# Patient Record
Sex: MALE | Race: BLACK OR AFRICAN AMERICAN | NOT HISPANIC OR LATINO | URBAN - METROPOLITAN AREA
[De-identification: names, ages, dates, MRNs, and addresses within clinical notes are randomized per-mention and may not be internally consistent; named-entity substitution may affect disease eponyms.]

---

## 2017-12-11 ENCOUNTER — INPATIENT (INPATIENT)
Facility: HOSPITAL | Age: 57
LOS: 3 days | Discharge: NURSING, REHAB OR LONG-TERM CARE | DRG: 194 | End: 2017-12-15
Attending: INTERNAL MEDICINE | Admitting: INTERNAL MEDICINE
Payer: MEDICAID

## 2017-12-11 VITALS
HEIGHT: 65 IN | WEIGHT: 126.1 LBS | OXYGEN SATURATION: 100 % | DIASTOLIC BLOOD PRESSURE: 77 MMHG | TEMPERATURE: 101 F | HEART RATE: 66 BPM | RESPIRATION RATE: 16 BRPM | SYSTOLIC BLOOD PRESSURE: 131 MMHG

## 2017-12-11 DIAGNOSIS — Z29.9 ENCOUNTER FOR PROPHYLACTIC MEASURES, UNSPECIFIED: ICD-10-CM

## 2017-12-11 DIAGNOSIS — N17.9 ACUTE KIDNEY FAILURE, UNSPECIFIED: ICD-10-CM

## 2017-12-11 DIAGNOSIS — R05 COUGH: ICD-10-CM

## 2017-12-11 DIAGNOSIS — D64.9 ANEMIA, UNSPECIFIED: ICD-10-CM

## 2017-12-11 LAB
ALBUMIN SERPL ELPH-MCNC: 3 G/DL — LOW (ref 3.5–5)
ALP SERPL-CCNC: 62 U/L — SIGNIFICANT CHANGE UP (ref 40–120)
ALT FLD-CCNC: 19 U/L DA — SIGNIFICANT CHANGE UP (ref 10–60)
ANION GAP SERPL CALC-SCNC: 7 MMOL/L — SIGNIFICANT CHANGE UP (ref 5–17)
ANION GAP SERPL CALC-SCNC: 8 MMOL/L — SIGNIFICANT CHANGE UP (ref 5–17)
APPEARANCE UR: CLEAR — SIGNIFICANT CHANGE UP
AST SERPL-CCNC: 24 U/L — SIGNIFICANT CHANGE UP (ref 10–40)
BASOPHILS # BLD AUTO: 0.1 K/UL — SIGNIFICANT CHANGE UP (ref 0–0.2)
BASOPHILS NFR BLD AUTO: 2.8 % — HIGH (ref 0–2)
BILIRUB SERPL-MCNC: 0.7 MG/DL — SIGNIFICANT CHANGE UP (ref 0.2–1.2)
BILIRUB UR-MCNC: NEGATIVE — SIGNIFICANT CHANGE UP
BUN SERPL-MCNC: 31 MG/DL — HIGH (ref 7–18)
BUN SERPL-MCNC: 33 MG/DL — HIGH (ref 7–18)
CALCIUM SERPL-MCNC: 7.2 MG/DL — LOW (ref 8.4–10.5)
CALCIUM SERPL-MCNC: 7.3 MG/DL — LOW (ref 8.4–10.5)
CHLORIDE SERPL-SCNC: 104 MMOL/L — SIGNIFICANT CHANGE UP (ref 96–108)
CHLORIDE SERPL-SCNC: 107 MMOL/L — SIGNIFICANT CHANGE UP (ref 96–108)
CK MB BLD-MCNC: 0.1 % — SIGNIFICANT CHANGE UP (ref 0–3.5)
CK MB BLD-MCNC: 0.1 % — SIGNIFICANT CHANGE UP (ref 0–3.5)
CK MB CFR SERPL CALC: 1.7 NG/ML — SIGNIFICANT CHANGE UP (ref 0–3.6)
CK MB CFR SERPL CALC: 3.7 NG/ML — HIGH (ref 0–3.6)
CK SERPL-CCNC: 1779 U/L — HIGH (ref 35–232)
CK SERPL-CCNC: 4882 U/L — HIGH (ref 35–232)
CO2 SERPL-SCNC: 29 MMOL/L — SIGNIFICANT CHANGE UP (ref 22–31)
CO2 SERPL-SCNC: 30 MMOL/L — SIGNIFICANT CHANGE UP (ref 22–31)
COLOR SPEC: YELLOW — SIGNIFICANT CHANGE UP
CREAT SERPL-MCNC: 4.07 MG/DL — HIGH (ref 0.5–1.3)
CREAT SERPL-MCNC: 4.11 MG/DL — HIGH (ref 0.5–1.3)
DIFF PNL FLD: ABNORMAL
EOSINOPHIL # BLD AUTO: 0.1 K/UL — SIGNIFICANT CHANGE UP (ref 0–0.5)
EOSINOPHIL NFR BLD AUTO: 2.3 % — SIGNIFICANT CHANGE UP (ref 0–6)
FLUAV H1 2009 PAND RNA SPEC QL NAA+PROBE: DETECTED
GLUCOSE SERPL-MCNC: 100 MG/DL — HIGH (ref 70–99)
GLUCOSE SERPL-MCNC: 60 MG/DL — LOW (ref 70–99)
GLUCOSE UR QL: NEGATIVE — SIGNIFICANT CHANGE UP
HCT VFR BLD CALC: 28.7 % — LOW (ref 39–50)
HCT VFR BLD CALC: 29.2 % — LOW (ref 39–50)
HGB BLD-MCNC: 9 G/DL — LOW (ref 13–17)
HGB BLD-MCNC: 9.1 G/DL — LOW (ref 13–17)
KETONES UR-MCNC: NEGATIVE — SIGNIFICANT CHANGE UP
LACTATE SERPL-SCNC: 0.9 MMOL/L — SIGNIFICANT CHANGE UP (ref 0.7–2)
LEUKOCYTE ESTERASE UR-ACNC: NEGATIVE — SIGNIFICANT CHANGE UP
LIDOCAIN IGE QN: 56 U/L — LOW (ref 73–393)
LYMPHOCYTES # BLD AUTO: 0.3 K/UL — LOW (ref 1–3.3)
LYMPHOCYTES # BLD AUTO: 5.4 % — LOW (ref 13–44)
MCHC RBC-ENTMCNC: 29.2 PG — SIGNIFICANT CHANGE UP (ref 27–34)
MCHC RBC-ENTMCNC: 30 PG — SIGNIFICANT CHANGE UP (ref 27–34)
MCHC RBC-ENTMCNC: 30.7 GM/DL — LOW (ref 32–36)
MCHC RBC-ENTMCNC: 31.6 GM/DL — LOW (ref 32–36)
MCV RBC AUTO: 94.9 FL — SIGNIFICANT CHANGE UP (ref 80–100)
MCV RBC AUTO: 95.2 FL — SIGNIFICANT CHANGE UP (ref 80–100)
MONOCYTES # BLD AUTO: 0.2 K/UL — SIGNIFICANT CHANGE UP (ref 0–0.9)
MONOCYTES NFR BLD AUTO: 5.1 % — SIGNIFICANT CHANGE UP (ref 2–14)
NEUTROPHILS # BLD AUTO: 4 K/UL — SIGNIFICANT CHANGE UP (ref 1.8–7.4)
NEUTROPHILS NFR BLD AUTO: 84.5 % — HIGH (ref 43–77)
NITRITE UR-MCNC: NEGATIVE — SIGNIFICANT CHANGE UP
PH UR: 6 — SIGNIFICANT CHANGE UP (ref 5–8)
PLATELET # BLD AUTO: 105 K/UL — LOW (ref 150–400)
PLATELET # BLD AUTO: 106 K/UL — LOW (ref 150–400)
POTASSIUM SERPL-MCNC: 3.3 MMOL/L — LOW (ref 3.5–5.3)
POTASSIUM SERPL-MCNC: 3.3 MMOL/L — LOW (ref 3.5–5.3)
POTASSIUM SERPL-SCNC: 3.3 MMOL/L — LOW (ref 3.5–5.3)
POTASSIUM SERPL-SCNC: 3.3 MMOL/L — LOW (ref 3.5–5.3)
PROT SERPL-MCNC: 6.5 G/DL — SIGNIFICANT CHANGE UP (ref 6–8.3)
PROT UR-MCNC: 500 MG/DL
RAPID RVP RESULT: DETECTED
RBC # BLD: 3.02 M/UL — LOW (ref 4.2–5.8)
RBC # BLD: 3.07 M/UL — LOW (ref 4.2–5.8)
RBC # FLD: 13.3 % — SIGNIFICANT CHANGE UP (ref 10.3–14.5)
RBC # FLD: 13.4 % — SIGNIFICANT CHANGE UP (ref 10.3–14.5)
SODIUM SERPL-SCNC: 141 MMOL/L — SIGNIFICANT CHANGE UP (ref 135–145)
SODIUM SERPL-SCNC: 144 MMOL/L — SIGNIFICANT CHANGE UP (ref 135–145)
SP GR SPEC: 1.01 — SIGNIFICANT CHANGE UP (ref 1.01–1.02)
TROPONIN I SERPL-MCNC: 0.15 NG/ML — HIGH (ref 0–0.04)
TROPONIN I SERPL-MCNC: 0.31 NG/ML — HIGH (ref 0–0.04)
UROBILINOGEN FLD QL: NEGATIVE — SIGNIFICANT CHANGE UP
WBC # BLD: 4.8 K/UL — SIGNIFICANT CHANGE UP (ref 3.8–10.5)
WBC # BLD: 6.4 K/UL — SIGNIFICANT CHANGE UP (ref 3.8–10.5)
WBC # FLD AUTO: 4.8 K/UL — SIGNIFICANT CHANGE UP (ref 3.8–10.5)
WBC # FLD AUTO: 6.4 K/UL — SIGNIFICANT CHANGE UP (ref 3.8–10.5)

## 2017-12-11 PROCEDURE — 99285 EMERGENCY DEPT VISIT HI MDM: CPT

## 2017-12-11 PROCEDURE — 74176 CT ABD & PELVIS W/O CONTRAST: CPT | Mod: 26

## 2017-12-11 PROCEDURE — 71020: CPT | Mod: 26

## 2017-12-11 RX ORDER — CHOLECALCIFEROL (VITAMIN D3) 125 MCG
1000 CAPSULE ORAL DAILY
Qty: 0 | Refills: 0 | Status: DISCONTINUED | OUTPATIENT
Start: 2017-12-11 | End: 2017-12-15

## 2017-12-11 RX ORDER — SODIUM CHLORIDE 9 MG/ML
1000 INJECTION INTRAMUSCULAR; INTRAVENOUS; SUBCUTANEOUS ONCE
Qty: 0 | Refills: 0 | Status: COMPLETED | OUTPATIENT
Start: 2017-12-11 | End: 2017-12-11

## 2017-12-11 RX ORDER — CEFTRIAXONE 500 MG/1
1 INJECTION, POWDER, FOR SOLUTION INTRAMUSCULAR; INTRAVENOUS ONCE
Qty: 0 | Refills: 0 | Status: COMPLETED | OUTPATIENT
Start: 2017-12-11 | End: 2017-12-11

## 2017-12-11 RX ORDER — IBUPROFEN 200 MG
800 TABLET ORAL ONCE
Qty: 0 | Refills: 0 | Status: COMPLETED | OUTPATIENT
Start: 2017-12-11 | End: 2017-12-11

## 2017-12-11 RX ORDER — PREGABALIN 225 MG/1
1000 CAPSULE ORAL DAILY
Qty: 0 | Refills: 0 | Status: DISCONTINUED | OUTPATIENT
Start: 2017-12-11 | End: 2017-12-15

## 2017-12-11 RX ORDER — FLUDROCORTISONE ACETATE 0.1 MG/1
0.1 TABLET ORAL
Qty: 0 | Refills: 0 | Status: DISCONTINUED | OUTPATIENT
Start: 2017-12-11 | End: 2017-12-15

## 2017-12-11 RX ORDER — OFLOXACIN 0.3 %
1 DROPS OPHTHALMIC (EYE)
Qty: 0 | Refills: 0 | Status: DISCONTINUED | OUTPATIENT
Start: 2017-12-11 | End: 2017-12-15

## 2017-12-11 RX ORDER — ASPIRIN/CALCIUM CARB/MAGNESIUM 324 MG
81 TABLET ORAL DAILY
Qty: 0 | Refills: 0 | Status: DISCONTINUED | OUTPATIENT
Start: 2017-12-11 | End: 2017-12-15

## 2017-12-11 RX ORDER — PREDNISOLONE SODIUM PHOSPHATE 1 %
1 DROPS OPHTHALMIC (EYE)
Qty: 0 | Refills: 0 | Status: DISCONTINUED | OUTPATIENT
Start: 2017-12-11 | End: 2017-12-15

## 2017-12-11 RX ORDER — SIMVASTATIN 20 MG/1
40 TABLET, FILM COATED ORAL AT BEDTIME
Qty: 0 | Refills: 0 | Status: DISCONTINUED | OUTPATIENT
Start: 2017-12-11 | End: 2017-12-12

## 2017-12-11 RX ADMIN — CEFTRIAXONE 100 GRAM(S): 500 INJECTION, POWDER, FOR SOLUTION INTRAMUSCULAR; INTRAVENOUS at 17:45

## 2017-12-11 RX ADMIN — Medication 800 MILLIGRAM(S): at 12:25

## 2017-12-11 RX ADMIN — Medication 800 MILLIGRAM(S): at 12:32

## 2017-12-11 RX ADMIN — SODIUM CHLORIDE 1000 MILLILITER(S): 9 INJECTION INTRAMUSCULAR; INTRAVENOUS; SUBCUTANEOUS at 20:35

## 2017-12-11 RX ADMIN — SODIUM CHLORIDE 1000 MILLILITER(S): 9 INJECTION INTRAMUSCULAR; INTRAVENOUS; SUBCUTANEOUS at 12:24

## 2017-12-11 NOTE — H&P ADULT - PROBLEM SELECTOR PLAN 5
-Improve score 1, given immobilisation  -No indication for DVt prophylaxis. -Patient presented with Hb 9.0, denies blood in stool or black colored stool.   -Follow Anemia panel and occult blood in stools (patient refusing rectal exam at this point)

## 2017-12-11 NOTE — ED PROVIDER NOTE - OBJECTIVE STATEMENT
56 y/o M pt with no PMHx and no PSHx presents to ED c/o fever, cough, and L lower rib pain x2 days. Pt denies chest pain, shortness of breath, or any other complaints. NKDA.

## 2017-12-11 NOTE — H&P ADULT - PROBLEM SELECTOR PLAN 3
-Patient presented with Hb 9.0, denies blood in stool or black colored stool.   -Follow Anemia panel -Patient has elevated troponin. CPK elevated to 4882, could be secondary to dehydration versus CKD. EKG: No evidence of ischemia.   -Iv hydration and follow CPK level. -Cr is 4.07. Patient had CKD as per charts but with unknown baseline.   -S/p 2L normal saline bolus  -Follow urine lytes and repeat BMP  -Ct scan -ve for hydronephrosis. -Cr is 4.07. Patient had CKD as per charts but with unknown baseline.   -S/p 2L normal saline bolus  -Follow urine lytes and repeat BMP  -Nephro Dr wright  -Ct scan -ve for hydronephrosis.

## 2017-12-11 NOTE — H&P ADULT - PROBLEM SELECTOR PLAN 1
-Patient presented with cough and fever, found to have Influenza positive on RVP testing. -Patient presented with cough and fever with associated reproducible chest pain, found to have Influenza positive on RVP testing.  -Chest pain is likely secondary to cough, cardiac enzymes are negative. EKG: Sinus tach at 120 bpm. Monitor on telemtery  -Started Tamiflu renally dosed. -Patient presented with cough and fever with associated reproducible chest pain, found to have Influenza positive on RVP testing.  -Chest pain is likely secondary to cough, cardiac enzymes are negative. EKG: Sinus tach at 120 bpm. Monitor on telemetry.  -Started Tamiflu renally dosed.

## 2017-12-11 NOTE — ED ADULT NURSE NOTE - ED STAT RN HANDOFF DETAILS
Pt alert and verbally responsive NAD, still pending urine test Pt instructed urinal given,  observed sleeping most of the time endorsed to Tiarra BORDEN

## 2017-12-11 NOTE — H&P ADULT - PROBLEM SELECTOR PLAN 4
-Improve score 1, given immobilisation  -No indication for DVt prophylaxis. -Patient presented with Hb 9.0, denies blood in stool or black colored stool.   -Follow Anemia panel and occult blood in stools. -Patient presented with Hb 9.0, denies blood in stool or black colored stool.   -Follow Anemia panel and occult blood in stools (patient refusing rectal exam at this point) -Patient has elevated troponin. CPK elevated to 4882, could be secondary to dehydration versus CKD. EKG: No evidence of ischemia.   -Iv hydration and follow CPK level. -Patient has elevated troponin. CPK elevated to 4882, could be secondary to dehydration versus CKD. EKG: No evidence of ischemia.   -Iv hydration and follow CPK level.  -Cardio Dr Still

## 2017-12-11 NOTE — ED ADULT NURSE NOTE - OBJECTIVE STATEMENT
pt as left below knee amputation w prostesis BIB EMS from care home with c/o upper abd pain cough and fever for 2 days denies C/P SOB, pt as left below knee amputation w prostesis

## 2017-12-11 NOTE — H&P ADULT - ASSESSMENT
57 M with PMH of anemia, CKD, CHF, OM s/p BKA L leg, CAD, glaucoma, htn, hyperparathyroid, neuropathy, OA, bronchitis, DM presented with cough and chest pain from last 3 days.

## 2017-12-11 NOTE — ED PROVIDER NOTE - PHYSICAL EXAMINATION
RESPIRATORY: Coughing on examination. RESPIRATORY: Coughing on examination. tenderness to palpation to left lower ribs. abdomen nontender

## 2017-12-11 NOTE — H&P ADULT - HISTORY OF PRESENT ILLNESS
57 M with PMH of anemia, CKD 57 M with PMH of anemia, CKD, CHF, OM s/p BKA L leg, CAD, glaucoma, htn, hyperparathyroid, neuropathy, OA, bronchitis, DM presented with cough and chest pain from last 3 days. Cough is productive with whitish sputum, associated with nausea, subjective fevers and chills. Chest pain is on and off 8/10 located in L chest/abdominal pain (described as rib pain), reproducible with cough and pressure.   Denies SOB, nausea, vomiting, diarrhea, constipation, leg swelling, abdominal pain, headache.     Sh: Current smoker, smokes 2 cig per day, non alcoholic, non drinker.   Fh: Not available from patient. 57 M with PMH of anemia, CKD, CHF, OM s/p BKA L leg, CAD, glaucoma, htn, hyperparathyroid, neuropathy, OA, bronchitis, DM presented with cough and chest pain from last 2 days. Cough is productive with whitish sputum, associated with nausea, subjective fevers and chills. Chest pain is on and off 8/10 located in L chest/abdominal pain (described as rib pain), reproducible with cough and pressure.   Denies SOB, nausea, vomiting, diarrhea, constipation, leg swelling, abdominal pain, headache.     Sh: Current smoker, smokes 2 cig per day, non alcoholic, non drinker.   Fh: Not available from patient.

## 2017-12-11 NOTE — ED PROVIDER NOTE - MEDICAL DECISION MAKING DETAILS
56 y/o M pt presents with PNA. Plan for CXR, labs, and will admit for Abx's. 58 y/o M pt presents with cough fever left rib pain. xray unremakrable. awaiting CT abdomen. I spoke with Dr De La Torre who said patient has some renal insufficiency but current creatitinine is elevated from baseline. admit to medicine once CT is normal. awaiting urinalysis.

## 2017-12-11 NOTE — H&P ADULT - PROBLEM SELECTOR PLAN 2
-Cr is -Cr is 4.07. Patient had CKD as per charts but with unknown baseline.   -S/p 2L normal saline bolus  -Follow urine lytes and repeat BMP  -Ct scan -ve for hydronephrosis. -CPK is elevated to 6412, likely secondary to dehydration secondary to fevers and chills  -Increase rate of IV fluids to 100ml/hr  -Trend CPK levels

## 2017-12-12 DIAGNOSIS — M62.82 RHABDOMYOLYSIS: ICD-10-CM

## 2017-12-12 DIAGNOSIS — R74.8 ABNORMAL LEVELS OF OTHER SERUM ENZYMES: ICD-10-CM

## 2017-12-12 DIAGNOSIS — N18.9 CHRONIC KIDNEY DISEASE, UNSPECIFIED: ICD-10-CM

## 2017-12-12 DIAGNOSIS — I15.1 HYPERTENSION SECONDARY TO OTHER RENAL DISORDERS: ICD-10-CM

## 2017-12-12 DIAGNOSIS — E87.6 HYPOKALEMIA: ICD-10-CM

## 2017-12-12 LAB
24R-OH-CALCIDIOL SERPL-MCNC: 36.6 NG/ML — SIGNIFICANT CHANGE UP (ref 30–80)
ALBUMIN SERPL ELPH-MCNC: 2.8 G/DL — LOW (ref 3.5–5)
ALP SERPL-CCNC: 57 U/L — SIGNIFICANT CHANGE UP (ref 40–120)
ALT FLD-CCNC: 25 U/L DA — SIGNIFICANT CHANGE UP (ref 10–60)
ANION GAP SERPL CALC-SCNC: 11 MMOL/L — SIGNIFICANT CHANGE UP (ref 5–17)
AST SERPL-CCNC: 76 U/L — HIGH (ref 10–40)
BASOPHILS # BLD AUTO: 0 K/UL — SIGNIFICANT CHANGE UP (ref 0–0.2)
BASOPHILS NFR BLD AUTO: 0.4 % — SIGNIFICANT CHANGE UP (ref 0–2)
BILIRUB SERPL-MCNC: 0.5 MG/DL — SIGNIFICANT CHANGE UP (ref 0.2–1.2)
BUN SERPL-MCNC: 36 MG/DL — HIGH (ref 7–18)
CALCIUM SERPL-MCNC: 7 MG/DL — LOW (ref 8.4–10.5)
CHLORIDE SERPL-SCNC: 103 MMOL/L — SIGNIFICANT CHANGE UP (ref 96–108)
CHOLEST SERPL-MCNC: 107 MG/DL — SIGNIFICANT CHANGE UP (ref 10–199)
CK MB BLD-MCNC: 0 % — SIGNIFICANT CHANGE UP (ref 0–3.5)
CK MB CFR SERPL CALC: 3 NG/ML — SIGNIFICANT CHANGE UP (ref 0–3.6)
CK SERPL-CCNC: 6412 U/L — HIGH (ref 35–232)
CO2 SERPL-SCNC: 25 MMOL/L — SIGNIFICANT CHANGE UP (ref 22–31)
CREAT ?TM UR-MCNC: 77 MG/DL — SIGNIFICANT CHANGE UP
CREAT SERPL-MCNC: 4.08 MG/DL — HIGH (ref 0.5–1.3)
EOSINOPHIL # BLD AUTO: 0 K/UL — SIGNIFICANT CHANGE UP (ref 0–0.5)
EOSINOPHIL NFR BLD AUTO: 0.4 % — SIGNIFICANT CHANGE UP (ref 0–6)
GLUCOSE BLDC GLUCOMTR-MCNC: 76 MG/DL — SIGNIFICANT CHANGE UP (ref 70–99)
GLUCOSE BLDC GLUCOMTR-MCNC: 78 MG/DL — SIGNIFICANT CHANGE UP (ref 70–99)
GLUCOSE BLDC GLUCOMTR-MCNC: 88 MG/DL — SIGNIFICANT CHANGE UP (ref 70–99)
GLUCOSE BLDC GLUCOMTR-MCNC: 90 MG/DL — SIGNIFICANT CHANGE UP (ref 70–99)
GLUCOSE SERPL-MCNC: 61 MG/DL — LOW (ref 70–99)
HBA1C BLD-MCNC: 6.3 % — HIGH (ref 4–5.6)
HCT VFR BLD CALC: 27.3 % — LOW (ref 39–50)
HDLC SERPL-MCNC: 58 MG/DL — SIGNIFICANT CHANGE UP (ref 40–125)
HGB BLD-MCNC: 8.5 G/DL — LOW (ref 13–17)
LIPID PNL WITH DIRECT LDL SERPL: 33 MG/DL — SIGNIFICANT CHANGE UP
LYMPHOCYTES # BLD AUTO: 0.3 K/UL — LOW (ref 1–3.3)
LYMPHOCYTES # BLD AUTO: 3.8 % — LOW (ref 13–44)
MAGNESIUM SERPL-MCNC: 1.9 MG/DL — SIGNIFICANT CHANGE UP (ref 1.6–2.6)
MCHC RBC-ENTMCNC: 29.9 PG — SIGNIFICANT CHANGE UP (ref 27–34)
MCHC RBC-ENTMCNC: 31.3 GM/DL — LOW (ref 32–36)
MCV RBC AUTO: 95.4 FL — SIGNIFICANT CHANGE UP (ref 80–100)
MONOCYTES # BLD AUTO: 0.3 K/UL — SIGNIFICANT CHANGE UP (ref 0–0.9)
MONOCYTES NFR BLD AUTO: 3.9 % — SIGNIFICANT CHANGE UP (ref 2–14)
NEUTROPHILS # BLD AUTO: 6.8 K/UL — SIGNIFICANT CHANGE UP (ref 1.8–7.4)
NEUTROPHILS NFR BLD AUTO: 91.5 % — HIGH (ref 43–77)
OSMOLALITY UR: 342 MOS/KG — SIGNIFICANT CHANGE UP (ref 50–1200)
PHOSPHATE SERPL-MCNC: 2.7 MG/DL — SIGNIFICANT CHANGE UP (ref 2.5–4.5)
PLATELET # BLD AUTO: 98 K/UL — LOW (ref 150–400)
POTASSIUM SERPL-MCNC: 3 MMOL/L — LOW (ref 3.5–5.3)
POTASSIUM SERPL-SCNC: 3 MMOL/L — LOW (ref 3.5–5.3)
POTASSIUM UR-SCNC: 23 MMOL/L — LOW (ref 25–125)
PROT SERPL-MCNC: 6.3 G/DL — SIGNIFICANT CHANGE UP (ref 6–8.3)
RBC # BLD: 2.86 M/UL — LOW (ref 4.2–5.8)
RBC # FLD: 13.1 % — SIGNIFICANT CHANGE UP (ref 10.3–14.5)
SODIUM SERPL-SCNC: 139 MMOL/L — SIGNIFICANT CHANGE UP (ref 135–145)
SODIUM UR-SCNC: 59 MMOL/L — SIGNIFICANT CHANGE UP (ref 40–220)
TOTAL CHOLESTEROL/HDL RATIO MEASUREMENT: 1.8 RATIO — LOW (ref 3.4–9.6)
TRIGL SERPL-MCNC: 82 MG/DL — SIGNIFICANT CHANGE UP (ref 10–149)
TROPONIN I SERPL-MCNC: 0.54 NG/ML — HIGH (ref 0–0.04)
TSH SERPL-MCNC: 0.59 UU/ML — SIGNIFICANT CHANGE UP (ref 0.34–4.82)
VIT B12 SERPL-MCNC: >2000 PG/ML — HIGH (ref 243–894)
WBC # BLD: 7.5 K/UL — SIGNIFICANT CHANGE UP (ref 3.8–10.5)
WBC # FLD AUTO: 7.5 K/UL — SIGNIFICANT CHANGE UP (ref 3.8–10.5)

## 2017-12-12 RX ORDER — ACETAMINOPHEN 500 MG
650 TABLET ORAL EVERY 6 HOURS
Qty: 0 | Refills: 0 | Status: DISCONTINUED | OUTPATIENT
Start: 2017-12-12 | End: 2017-12-15

## 2017-12-12 RX ORDER — POTASSIUM CHLORIDE 20 MEQ
40 PACKET (EA) ORAL ONCE
Qty: 0 | Refills: 0 | Status: COMPLETED | OUTPATIENT
Start: 2017-12-12 | End: 2017-12-12

## 2017-12-12 RX ORDER — POTASSIUM CHLORIDE 20 MEQ
20 PACKET (EA) ORAL ONCE
Qty: 0 | Refills: 0 | Status: COMPLETED | OUTPATIENT
Start: 2017-12-12 | End: 2017-12-12

## 2017-12-12 RX ORDER — INSULIN LISPRO 100/ML
VIAL (ML) SUBCUTANEOUS
Qty: 0 | Refills: 0 | Status: DISCONTINUED | OUTPATIENT
Start: 2017-12-12 | End: 2017-12-15

## 2017-12-12 RX ORDER — SODIUM CHLORIDE 9 MG/ML
1000 INJECTION INTRAMUSCULAR; INTRAVENOUS; SUBCUTANEOUS
Qty: 0 | Refills: 0 | Status: DISCONTINUED | OUTPATIENT
Start: 2017-12-12 | End: 2017-12-12

## 2017-12-12 RX ORDER — SODIUM CHLORIDE 9 MG/ML
1000 INJECTION INTRAMUSCULAR; INTRAVENOUS; SUBCUTANEOUS
Qty: 0 | Refills: 0 | Status: DISCONTINUED | OUTPATIENT
Start: 2017-12-12 | End: 2017-12-15

## 2017-12-12 RX ADMIN — Medication 1 APPLICATION(S): at 05:48

## 2017-12-12 RX ADMIN — Medication 1 DROP(S): at 18:02

## 2017-12-12 RX ADMIN — Medication 1 APPLICATION(S): at 18:05

## 2017-12-12 RX ADMIN — Medication 1 DROP(S): at 05:48

## 2017-12-12 RX ADMIN — SODIUM CHLORIDE 80 MILLILITER(S): 9 INJECTION INTRAMUSCULAR; INTRAVENOUS; SUBCUTANEOUS at 05:45

## 2017-12-12 RX ADMIN — Medication 30 MILLIGRAM(S): at 00:59

## 2017-12-12 RX ADMIN — Medication 81 MILLIGRAM(S): at 11:31

## 2017-12-12 RX ADMIN — Medication 40 MILLIEQUIVALENT(S): at 09:50

## 2017-12-12 RX ADMIN — Medication 1 DROP(S): at 00:40

## 2017-12-12 RX ADMIN — Medication 1 DROP(S): at 18:05

## 2017-12-12 RX ADMIN — Medication 30 MILLIGRAM(S): at 11:31

## 2017-12-12 RX ADMIN — SODIUM CHLORIDE 100 MILLILITER(S): 9 INJECTION INTRAMUSCULAR; INTRAVENOUS; SUBCUTANEOUS at 09:50

## 2017-12-12 RX ADMIN — Medication 1000 UNIT(S): at 11:30

## 2017-12-12 RX ADMIN — Medication 1 APPLICATION(S): at 00:40

## 2017-12-12 RX ADMIN — Medication 1 DROP(S): at 05:47

## 2017-12-12 RX ADMIN — FLUDROCORTISONE ACETATE 0.1 MILLIGRAM(S): 0.1 TABLET ORAL at 05:46

## 2017-12-12 RX ADMIN — PREGABALIN 1000 MICROGRAM(S): 225 CAPSULE ORAL at 11:31

## 2017-12-12 RX ADMIN — Medication 20 MILLIEQUIVALENT(S): at 09:49

## 2017-12-12 RX ADMIN — Medication 1 APPLICATION(S): at 11:31

## 2017-12-12 RX ADMIN — FLUDROCORTISONE ACETATE 0.1 MILLIGRAM(S): 0.1 TABLET ORAL at 18:01

## 2017-12-12 NOTE — CONSULT NOTE ADULT - SUBJECTIVE AND OBJECTIVE BOX
Patient is a 57y Male whom presented to the hospital with cough and chest pain, being treated for rhabdomyolysis and flu, noted to have abnormal renal function    Medical HPI:  57 M with PMH of anemia, CKD, CHF, OM s/p BKA L leg, CAD, glaucoma, htn, hyperparathyroid, neuropathy, OA, bronchitis, DM presented with cough and chest pain from last 2 days. Cough is productive with whitish sputum, associated with nausea, subjective fevers and chills. Chest pain is on and off 8/10 located in L chest/abdominal pain (described as rib pain), reproducible with cough and pressure.   Denies SOB, nausea, vomiting, diarrhea, constipation, leg swelling, abdominal pain, headache.     Sh: Current smoker, smokes 2 cig per day, non alcoholic, non drinker.   Fh: Not available from patient. (11 Dec 2017 22:27)  Renal HPI:  pts current chart reviewed and case discussed with resident  pt is known to have ckd but stage or baseline serum cr is unknown  pt is aware of ckd x 2 yrs but does not know the stage   pt denies pmh of renal stones, recurrent uti or nephrotic edema or nephrotic syndrome  pt give pmh of retinopathy and s/p laser treatment  pt denies Nsaids use or otc other nephrotoxic supplements  pt currently denies skin rash, leg edema,gi or gusymptons    PAST MEDICAL & SURGICAL HISTORY:  CKD, CHF, OM s/p BKA L leg, CAD, glaucoma, htn, hyperparathyroid, neuropathy, OA, bronchitis, and DM       Home Medications: Reviewed    MEDICATIONS  (STANDING):  aspirin  chewable 81 milliGRAM(s) Oral daily  cholecalciferol 1000 Unit(s) Oral daily  cyanocobalamin 1000 MICROGram(s) Oral daily  fludroCORTISONE 0.1 milliGRAM(s) Oral two times a day  insulin lispro (HumaLOG) corrective regimen sliding scale   SubCutaneous three times a day before meals  ofloxacin 0.3% Solution 1 Drop(s) Both EYES four times a day  oseltamivir 30 milliGRAM(s) Oral daily  petrolatum Ophthalmic Ointment 1 Application(s) Both EYES four times a day  prednisoLONE acetate 1% Suspension 1 Drop(s) Both EYES two times a day  sodium chloride 0.9%. 1000 milliLiter(s) (100 mL/Hr) IV Continuous <Continuous>    MEDICATIONS  (PRN):  acetaminophen   Tablet 650 milliGRAM(s) Oral every 6 hours PRN For Temp greater than 38 C (100.4 F)      Allergies    fish (Rash)  liver (Anaphylaxis)  No Known Drug Allergies    Intolerances        SOCIAL HISTORY:  Alcohol use [X ] No  [ ] Yes  Smoking  [ ] No  [x ] Yesx many yrs  Drug Abuse [X ] No  [ ] Yes  Tattoo [X ] No  [ ] Yes  History of Blood transfusion [ ] No  [ x] Yes    FAMILY HISTORY:n/c      REVIEW OF SYSTEMS:  CONSTITUTIONAL: +  weakness, +fevers , no  chills  EYES/ENT: No visual changes;  No vertigo or throat pain   NECK: No pain or stiffness  RESPIRATORY: + cough, no wheezing, hemoptysis; No shortness of breath  CARDIOVASCULAR: No chest pain or palpitations  GASTROINTESTINAL: No abdominal or epigastric pain. No nausea, vomiting, or hematemesis; No diarrhea or constipation. No melena or hematochezia.  GENITOURINARY: No dysuria, frequency or hematuria  NEUROLOGICAL: No numbness or weakness  SKIN: No itching, burning, rashes, or lesions   All other review of systems is negative unless indicated above    Vital Signs  T(F): 98.2 (17 @ 19:56), Max: 100.6 (17 @ 05:14)  HR: 101 (17 @ 19:56) (101 - 119)  BP: 151/92 (17 @ 19:56) (143/68 - 180/90)  ABP: --  RR: 20 (17 @ 19:56) (18 - 20)  SpO2: 94% (17 @ 19:56) (93% - 97%)  Wt(kg): --  CVP(cm H2O): --  CO: --  PCWP: --    I and O's:    Daily     Daily     PHYSICAL EXAM:  Constitutional: well developed, Mal-nourished  and in nad  HEENT: PERRLA,  no icteric sclera and mild pallor of conjunctiva noted  Neck: No JVD, thyromegaly or adenopathy  Respiratory: reduced air entry lower lungs with no rales, wheezing or rhonchi  Cardiovascular: S1 and S2 normally heard  Gastrointestinal: soft, nondistended, nontender and normal bowel sounds heard  Extremities: No peripheral edema or cyanosis  pt has LLE Prosthesis noted  Neurological: A/O x 3, no focal deficits  Psychiatric: Normal mood, normal affect  : No flank tenderness  Skin: No rashes        LABS:                        8.5    7.5   )-----------( 98       ( 12 Dec 2017 05:18 )             27.3     2.7  --            139  |  103  |  36<H>  ----------------------------<  61<L>  3.0<L>   |  25  |  4.08<H>  egfr;18 %-stage 4       144  |  107  |  33<H>  ----------------------------<  60<L>  3.3<L>   |  29  |  4.11<H>      141  |  104  |  31<H>  ----------------------------<  100<H>  3.3<L>   |  30  |  4.07<H>    Ca    7.0<L>      12 Dec 2017 05:18  Ca    7.2<L>      11 Dec 2017 23:01  Ca    7.3<L>      11 Dec 2017 12:04  Phos  2.7       Mg     1.9         TPro  6.3  /  Alb  2.8<L>  /  TBili  0.5  /  DBili  x   /  AST  76<H>  /  ALT  25  /  AlkPhos  57    TPro  6.5  /  Alb  3.0<L>  /  TBili  0.7  /  DBili  x   /  AST  24  /  ALT  19  /  AlkPhos  62            URINE STUDIES:  Urinalysis Basic - ( 11 Dec 2017 21:24 )    Color: Yellow / Appearance: Clear / S.015 / pH: x  Gluc: x / Ketone: Negative  / Bili: Negative / Urobili: Negative   Blood: x / Protein: 500 mg/dL / Nitrite: Negative   Leuk Esterase: Negative / RBC: 10-25 /HPF / WBC 3-5 /HPF   Sq Epi: x / Non Sq Epi: Few /HPF / Bacteria: Few /HPF        Sodium, Random Urine: 59 mmol/L (17 @ 20:02)  Potassium, Random Urine: 23 mmol/L (17 @ 20:02)  Creatinine, Random Urine: 77 mg/dL (17 @ 20:02)                RADIOLOGY & ADDITIONAL STUDIES:      < from: CT Abdomen and Pelvis No Cont (17 @ 20:14) >  EXAM:  CT ABDOMEN AND PELVIS                            PROCEDURE DATE:  2017          INTERPRETATION:  CLINICAL STATEMENT: left abd pain vomiting cough    TECHNIQUE: CT of the abdomen and pelvis was performed without IV and oral   contrast.    COMPARISON: None.    FINDINGS:  2 mm nodule left lower lobe. Cardiomegaly noted. Small pericardial   effusion.    The evaluation of the abdominal viscera and the bowel is limited without   contrast.    The liver, gallbladder are grossly unremarkable.    The spleen, pancreas, right adrenal gland are grossly unremarkable.   Thickening of the left adrenal gland noted    There is no hydronephrosis or urinary calculus.    There is no dilatation of the bowel.  Large amount of stool noted in the   colon. Evaluation of bowel limited due to lack of oral contrast.   Evaluation of distal stomach limited due to underdistention. Wall   thickening not excluded.     Mild wall thickening distal esophagus noted   with mild dilatation    There is no intraperitoneal free air.  There is no free fluid.    Urinary bladder markedly distended. Prostate gland measures 4.7 cm in   transverse dimension.    Age-indeterminate compression deformity with areas of sclerosis involving   T11 and T12 vertebral bodies. Nonspecific stranding of the fat noted.    IMPRESSION:  Limited exam due to lack of contrast and intraperitoneal fat.    No bowel obstruction. Large amount of stool in the colon. If there is   clinical concern for GI pathology, repeat exam with oral contrast   recommended.    Age-indeterminate compression deformity of T11 and T12 vertebral bodies   with nonspecific stranding of the fat. MRI exam recommended.    Additional findings as described above      < end of copied text >

## 2017-12-12 NOTE — CONSULT NOTE ADULT - PROBLEM SELECTOR RECOMMENDATION 9
r/o secondary to pr erenal azotemia/hypovolemia, less likley ATN   -agree with hydration  -Keep patient euvolemic and renal diet  -Avoid Nephrotoxic Meds/ Agents such as (NSAIDs, IV contrast, Aminoglycosides such as gentamicin, -Gadolinium contrast, Phosphate containing enemas, etc..)  -Adjust Medications according to eGFR  -f/u bmp daily  -f/u I/O s daily

## 2017-12-12 NOTE — CONSULT NOTE ADULT - PROBLEM SELECTOR RECOMMENDATION 2
r/o stage 3 to 4, secondary to diabetic nephropathy with heavy proteinuria  -order renal sono  -Keep patient euvolemic and renal diet  -Avoid Nephrotoxic Meds/ Agents such as (NSAIDs, IV contrast, Aminoglycosides such as gentamicin, -Gadolinium contrast, Phosphate containing enemas, etc..)  -Adjust Medications according to eGFR  -f/u pth-intact in am

## 2017-12-12 NOTE — CONSULT NOTE ADULT - SUBJECTIVE AND OBJECTIVE BOX
HISTORY OF PRESENT ILLNESS: HPI:  57 M with PMH of anemia, CKD, CHF, OM s/p BKA L leg, CAD, glaucoma, htn, hyperparathyroid, neuropathy, OA, bronchitis, DM presented with cough and chest pain from last 2 days. Cough is productive with whitish sputum, associated with nausea, subjective fevers and chills. Chest pain is on and off 8/10 located in L chest/abdominal pain (described as rib pain), reproducible with cough   Denies SOB, nausea, vomiting, diarrhea, constipation, leg swelling, abdominal pain, headache.     Sh: Current smoker, smokes 2 cig per day, non alcoholic, non drinker.   Fh: Not available from patient. (11 Dec 2017 22:27)      PAST MEDICAL & SURGICAL HISTORY:  No pertinent past medical history  No significant past surgical history          MEDICATIONS:  MEDICATIONS  (STANDING):  aspirin  chewable 81 milliGRAM(s) Oral daily  cholecalciferol 1000 Unit(s) Oral daily  cyanocobalamin 1000 MICROGram(s) Oral daily  fludroCORTISONE 0.1 milliGRAM(s) Oral two times a day  insulin lispro (HumaLOG) corrective regimen sliding scale   SubCutaneous three times a day before meals  ofloxacin 0.3% Solution 1 Drop(s) Both EYES four times a day  oseltamivir 30 milliGRAM(s) Oral daily  petrolatum Ophthalmic Ointment 1 Application(s) Both EYES four times a day  prednisoLONE acetate 1% Suspension 1 Drop(s) Both EYES two times a day  sodium chloride 0.9%. 1000 milliLiter(s) (100 mL/Hr) IV Continuous <Continuous>      Allergies    fish (Rash)  liver (Anaphylaxis)  No Known Drug Allergies    Intolerances        FAMILY HISTORY:    Non-contributary for premature coronary disease or sudden cardiac death    SOCIAL HISTORY:    [ ] Non-smoker  [X ] Smoker  [ ] Alcohol      REVIEW OF SYSTEMS:  [ X]chest pain  [  ]shortness of breath  [  ]palpitations  [  ]syncope  [ ]near syncope [ ]upper extremity weakness   [ ] lower extremity weakness  [  ]diplopia  [  ]altered mental status   [  ]fevers  [ ]chills [ ]nausea  [ ]vomitting  [  ]dysphagia    [ ]abdominal pain  [ ]melena  [ ]BRBPR    [  ]epistaxis  [  ]rash    [ ]lower extremity edema        [X ] All others negative	  [ ] Unable to obtain    PHYSICAL EXAM:  T(C): 37.5 (12-12-17 @ 12:12), Max: 38.1 (12-12-17 @ 05:14)  HR: 118 (12-12-17 @ 12:12) (100 - 119)  BP: 176/95 (12-12-17 @ 12:12) (143/68 - 180/90)  RR: 20 (12-12-17 @ 12:12) (16 - 20)  SpO2: 96% (12-12-17 @ 12:12) (93% - 100%)  Wt(kg): --  I&O's Summary        HEENT:   Normal oral mucosa, PERRL, EOMI	  Lymphatic: No obvious lymphadenopathy , no edema  Cardiovascular: Normal S1 S2, No JVD,  1/6 DIANA murmur , Peripheral pulses palpable 2+ bilaterally  Respiratory: Lungs clear to auscultation, normal effort 	  Gastrointestinal:  Soft, Non-tender, + BS	  Skin: No rashes, No cyanosis, warm to touch  Musculoskeletal: Left BKA  Psychiatry:  Appropriate Mood & affect     TELEMETRY: 	  Sinus  ECG:  	Sinus tach 120 BPM, LVH with repolarization abnormality  RADIOLOGY:      CXR: No infiltrate    	  LABS:	 	    CARDIAC MARKERS:  CARDIAC MARKERS ( 12 Dec 2017 05:18 )  0.535 ng/mL / x     / 6412 U/L / x     / 3.0 ng/mL  CARDIAC MARKERS ( 11 Dec 2017 23:01 )  0.312 ng/mL / x     / 4882 U/L / x     / 3.7 ng/mL  CARDIAC MARKERS ( 11 Dec 2017 22:44 )  0.150 ng/mL / x     / 1779 U/L / x     / 1.7 ng/mL                              8.5    7.5   )-----------( 98       ( 12 Dec 2017 05:18 )             27.3     Hb Trend: 8.5<--, 9.1<--    12-12    139  |  103  |  36<H>  ----------------------------<  61<L>  3.0<L>   |  25  |  4.08<H>    Ca    7.0<L>      12 Dec 2017 05:18  Phos  2.7     12-12  Mg     1.9     12-12    TPro  6.3  /  Alb  2.8<L>  /  TBili  0.5  /  DBili  x   /  AST  76<H>  /  ALT  25  /  AlkPhos  57  12-12    Creatinine Trend: 4.08<--, 4.11<--, 4.07<--    HgA1c: Hemoglobin A1C, Whole Blood: 6.3 % (12-12 @ 09:21)    TSH: Thyroid Stimulating Hormone, Serum: 0.59 uU/mL (12-12 @ 05:18)      ASSESSMENT/PLAN: 	57y Male anemia, CKD, CHF, OM s/p BKA L leg, CAD, glaucoma, htn, hyperparathyroid, neuropathy, OA, bronchitis, DM presented with cough and chest pain from last 2 days found with positive biomarkers and influenza A    - His CPK trend and top are suggestive of skeletal origin not cardiac origin  - his CP is not anginal, reproduced with cough and palpation, likely costochondritis.  - Check echo  - Can D/C tele    I once again thank you for allowing me to participate in the care of your patient.  If you have any questions or concerns please do not hesitate to contact me.    Dominic Still MD, FACC  [facility], [facility]  [address] [place] [address]  PHONE:  [phone]  BEEPER : [phone] HISTORY OF PRESENT ILLNESS: HPI:  57 M with PMH of anemia, CKD, CHF, OM s/p BKA L leg, CAD, glaucoma, htn, hyperparathyroid, neuropathy, OA, bronchitis, DM presented with cough and chest pain from last 2 days. Cough is productive with whitish sputum, associated with nausea, subjective fevers and chills. Chest pain is on and off 8/10 located in L chest/abdominal pain (described as rib pain), reproducible with cough   Denies SOB, nausea, vomiting, diarrhea, constipation, leg swelling, abdominal pain, headache.     Sh: Current smoker, smokes 2 cig per day, non alcoholic, non drinker.   Fh: Not available from patient. (11 Dec 2017 22:27)      PAST MEDICAL & SURGICAL HISTORY:  No pertinent past medical history  No significant past surgical history          MEDICATIONS:  MEDICATIONS  (STANDING):  aspirin  chewable 81 milliGRAM(s) Oral daily  cholecalciferol 1000 Unit(s) Oral daily  cyanocobalamin 1000 MICROGram(s) Oral daily  fludroCORTISONE 0.1 milliGRAM(s) Oral two times a day  insulin lispro (HumaLOG) corrective regimen sliding scale   SubCutaneous three times a day before meals  ofloxacin 0.3% Solution 1 Drop(s) Both EYES four times a day  oseltamivir 30 milliGRAM(s) Oral daily  petrolatum Ophthalmic Ointment 1 Application(s) Both EYES four times a day  prednisoLONE acetate 1% Suspension 1 Drop(s) Both EYES two times a day  sodium chloride 0.9%. 1000 milliLiter(s) (100 mL/Hr) IV Continuous <Continuous>      Allergies    fish (Rash)  liver (Anaphylaxis)  No Known Drug Allergies    Intolerances        FAMILY HISTORY:    Non-contributary for premature coronary disease or sudden cardiac death    SOCIAL HISTORY:    [ ] Non-smoker  [X ] Smoker  [ ] Alcohol      REVIEW OF SYSTEMS:  [ X]chest pain  [  ]shortness of breath  [  ]palpitations  [  ]syncope  [ ]near syncope [ ]upper extremity weakness   [ ] lower extremity weakness  [  ]diplopia  [  ]altered mental status   [  ]fevers  [ ]chills [ ]nausea  [ ]vomitting  [  ]dysphagia    [ ]abdominal pain  [ ]melena  [ ]BRBPR    [  ]epistaxis  [  ]rash    [ ]lower extremity edema        [X ] All others negative	  [ ] Unable to obtain    PHYSICAL EXAM:  T(C): 37.5 (12-12-17 @ 12:12), Max: 38.1 (12-12-17 @ 05:14)  HR: 118 (12-12-17 @ 12:12) (100 - 119)  BP: 176/95 (12-12-17 @ 12:12) (143/68 - 180/90)  RR: 20 (12-12-17 @ 12:12) (16 - 20)  SpO2: 96% (12-12-17 @ 12:12) (93% - 100%)  Wt(kg): --  I&O's Summary        HEENT:   Normal oral mucosa, PERRL, EOMI	  Lymphatic: No obvious lymphadenopathy , no edema  Cardiovascular: Normal S1 S2, No JVD,  1/6 DIANA murmur , Peripheral pulses palpable 2+ bilaterally  Respiratory: Lungs clear to auscultation, normal effort 	  Gastrointestinal:  Soft, Non-tender, + BS	  Skin: No rashes, No cyanosis, warm to touch  Musculoskeletal: Left BKA  Psychiatry:  Appropriate Mood & affect     TELEMETRY: 	  Sinus  ECG:  	Sinus tach 120 BPM, LVH with repolarization abnormality  RADIOLOGY:      CXR: No infiltrate    	  LABS:	 	    CARDIAC MARKERS:  CARDIAC MARKERS ( 12 Dec 2017 05:18 )  0.535 ng/mL / x     / 6412 U/L / x     / 3.0 ng/mL  CARDIAC MARKERS ( 11 Dec 2017 23:01 )  0.312 ng/mL / x     / 4882 U/L / x     / 3.7 ng/mL  CARDIAC MARKERS ( 11 Dec 2017 22:44 )  0.150 ng/mL / x     / 1779 U/L / x     / 1.7 ng/mL                              8.5    7.5   )-----------( 98       ( 12 Dec 2017 05:18 )             27.3     Hb Trend: 8.5<--, 9.1<--    12-12    139  |  103  |  36<H>  ----------------------------<  61<L>  3.0<L>   |  25  |  4.08<H>    Ca    7.0<L>      12 Dec 2017 05:18  Phos  2.7     12-12  Mg     1.9     12-12    TPro  6.3  /  Alb  2.8<L>  /  TBili  0.5  /  DBili  x   /  AST  76<H>  /  ALT  25  /  AlkPhos  57  12-12    Creatinine Trend: 4.08<--, 4.11<--, 4.07<--    HgA1c: Hemoglobin A1C, Whole Blood: 6.3 % (12-12 @ 09:21)    TSH: Thyroid Stimulating Hormone, Serum: 0.59 uU/mL (12-12 @ 05:18)      ASSESSMENT/PLAN: 	57y Male anemia, CKD, CHF, OM s/p BKA L leg, CAD, glaucoma, htn, hyperparathyroid, neuropathy, OA, bronchitis, DM presented with cough and chest pain from last 2 days found with positive biomarkers and influenza A    - His CPK trend and top are suggestive of skeletal origin not cardiac origin  - his CP is not anginal, reproduced with cough and palpation, likely costochondritis.  - Check echo  - Smoking cessation stressed  - Can D/C tele    I once again thank you for allowing me to participate in the care of your patient.  If you have any questions or concerns please do not hesitate to contact me.    Dominic Still MD, FACC  [facility], [facility]  [address] [place] [address]  PHONE:  [phone]  BEEPER : [phone]

## 2017-12-12 NOTE — CONSULT NOTE ADULT - PROBLEM SELECTOR RECOMMENDATION 6
mild, secondary to poor po intake vs renal loses from [facility]  -replace po kcl prn  -f/u k level daily  -keep k >4 and <5

## 2017-12-13 LAB
ANION GAP SERPL CALC-SCNC: 8 MMOL/L — SIGNIFICANT CHANGE UP (ref 5–17)
BUN SERPL-MCNC: 47 MG/DL — HIGH (ref 7–18)
CALCIUM SERPL-MCNC: 6.8 MG/DL — LOW (ref 8.4–10.5)
CHLORIDE SERPL-SCNC: 110 MMOL/L — HIGH (ref 96–108)
CK MB CFR SERPL CALC: 4.8 NG/ML — HIGH (ref 0–3.6)
CK SERPL-CCNC: 6864 U/L — HIGH (ref 35–232)
CK SERPL-CCNC: 7118 U/L — HIGH (ref 35–232)
CO2 SERPL-SCNC: 26 MMOL/L — SIGNIFICANT CHANGE UP (ref 22–31)
CREAT SERPL-MCNC: 4.21 MG/DL — HIGH (ref 0.5–1.3)
GLUCOSE BLDC GLUCOMTR-MCNC: 137 MG/DL — HIGH (ref 70–99)
GLUCOSE BLDC GLUCOMTR-MCNC: 170 MG/DL — HIGH (ref 70–99)
GLUCOSE BLDC GLUCOMTR-MCNC: 175 MG/DL — HIGH (ref 70–99)
GLUCOSE BLDC GLUCOMTR-MCNC: 91 MG/DL — SIGNIFICANT CHANGE UP (ref 70–99)
GLUCOSE SERPL-MCNC: 103 MG/DL — HIGH (ref 70–99)
HCT VFR BLD CALC: 28.5 % — LOW (ref 39–50)
HGB BLD-MCNC: 9.3 G/DL — LOW (ref 13–17)
INR BLD: 1 RATIO — SIGNIFICANT CHANGE UP (ref 0.88–1.16)
MCHC RBC-ENTMCNC: 30.6 PG — SIGNIFICANT CHANGE UP (ref 27–34)
MCHC RBC-ENTMCNC: 32.7 GM/DL — SIGNIFICANT CHANGE UP (ref 32–36)
MCV RBC AUTO: 93.4 FL — SIGNIFICANT CHANGE UP (ref 80–100)
PLATELET # BLD AUTO: 99 K/UL — LOW (ref 150–400)
POTASSIUM SERPL-MCNC: 3.7 MMOL/L — SIGNIFICANT CHANGE UP (ref 3.5–5.3)
POTASSIUM SERPL-SCNC: 3.7 MMOL/L — SIGNIFICANT CHANGE UP (ref 3.5–5.3)
PROTHROM AB SERPL-ACNC: 10.9 SEC — SIGNIFICANT CHANGE UP (ref 9.8–12.7)
RBC # BLD: 3.06 M/UL — LOW (ref 4.2–5.8)
RBC # FLD: 13 % — SIGNIFICANT CHANGE UP (ref 10.3–14.5)
SODIUM SERPL-SCNC: 144 MMOL/L — SIGNIFICANT CHANGE UP (ref 135–145)
TROPONIN I SERPL-MCNC: 0.65 NG/ML — HIGH (ref 0–0.04)
WBC # BLD: 3.8 K/UL — SIGNIFICANT CHANGE UP (ref 3.8–10.5)
WBC # FLD AUTO: 3.8 K/UL — SIGNIFICANT CHANGE UP (ref 3.8–10.5)

## 2017-12-13 PROCEDURE — 71010: CPT | Mod: 26

## 2017-12-13 RX ADMIN — FLUDROCORTISONE ACETATE 0.1 MILLIGRAM(S): 0.1 TABLET ORAL at 05:50

## 2017-12-13 RX ADMIN — Medication 81 MILLIGRAM(S): at 15:14

## 2017-12-13 RX ADMIN — Medication 30 MILLIGRAM(S): at 12:45

## 2017-12-13 RX ADMIN — Medication 1 APPLICATION(S): at 12:46

## 2017-12-13 RX ADMIN — Medication 1 DROP(S): at 17:07

## 2017-12-13 RX ADMIN — Medication 1 APPLICATION(S): at 17:06

## 2017-12-13 RX ADMIN — Medication 1 DROP(S): at 07:04

## 2017-12-13 RX ADMIN — PREGABALIN 1000 MICROGRAM(S): 225 CAPSULE ORAL at 15:14

## 2017-12-13 RX ADMIN — Medication 1: at 12:44

## 2017-12-13 RX ADMIN — Medication 1000 UNIT(S): at 15:13

## 2017-12-13 RX ADMIN — FLUDROCORTISONE ACETATE 0.1 MILLIGRAM(S): 0.1 TABLET ORAL at 17:07

## 2017-12-13 RX ADMIN — Medication 1 DROP(S): at 05:50

## 2017-12-13 RX ADMIN — Medication 1 DROP(S): at 12:45

## 2017-12-13 NOTE — PROGRESS NOTE ADULT - PROBLEM SELECTOR PLAN 1
r/o secondary to pr erenal azotemia/hypovolemia, less likley ATN , no changes in egfr last 24 hrs  -agree with hydration  -Keep patient euvolemic and renal diet  -Avoid Nephrotoxic Meds/ Agents such as (NSAIDs, IV contrast, Aminoglycosides such as gentamicin, -Gadolinium contrast, Phosphate containing enemas, etc..)  -Adjust Medications according to eGFR  -f/u bmp daily  -f/u I/O s daily.

## 2017-12-13 NOTE — PROGRESS NOTE ADULT - SUBJECTIVE AND OBJECTIVE BOX
PGY 1 Note discussed with supervising resident and primary attending    Patient is a 57y old  Male who presents with a chief complaint of Cough and chest pain x 2 days (11 Dec 2017 22:27)      INTERVAL HPI/OVERNIGHT EVENTS: offers no new complaints; current symptoms resolving    MEDICATIONS  (STANDING):  aspirin  chewable 81 milliGRAM(s) Oral daily  cholecalciferol 1000 Unit(s) Oral daily  cyanocobalamin 1000 MICROGram(s) Oral daily  fludroCORTISONE 0.1 milliGRAM(s) Oral two times a day  insulin lispro (HumaLOG) corrective regimen sliding scale   SubCutaneous three times a day before meals  ofloxacin 0.3% Solution 1 Drop(s) Both EYES four times a day  oseltamivir 30 milliGRAM(s) Oral daily  petrolatum Ophthalmic Ointment 1 Application(s) Both EYES four times a day  prednisoLONE acetate 1% Suspension 1 Drop(s) Both EYES two times a day  sodium chloride 0.9%. 1000 milliLiter(s) (100 mL/Hr) IV Continuous <Continuous>    MEDICATIONS  (PRN):  acetaminophen   Tablet 650 milliGRAM(s) Oral every 6 hours PRN For Temp greater than 38 C (100.4 F)      __________________________________________________  REVIEW OF SYSTEMS:    CONSTITUTIONAL: No fever,   EYES: no acute visual disturbances  NECK: No pain or stiffness  RESPIRATORY: No cough; No shortness of breath  CARDIOVASCULAR: No chest pain, no palpitations  GASTROINTESTINAL: No pain. No nausea or vomiting; No diarrhea   NEUROLOGICAL: No headache or numbness, no tremors  MUSCULOSKELETAL: No joint pain, no muscle pain  GENITOURINARY: no dysuria, no frequency, no hesitancy  PSYCHIATRY: no depression , no anxiety  ALL OTHER  ROS negative        Vital Signs Last 24 Hrs  T(C): 37.1 (13 Dec 2017 15:35), Max: 37.4 (13 Dec 2017 08:06)  T(F): 98.7 (13 Dec 2017 15:35), Max: 99.3 (13 Dec 2017 08:06)  HR: 89 (13 Dec 2017 15:35) (89 - 101)  BP: 147/81 (13 Dec 2017 15:35) (141/81 - 174/99)  BP(mean): --  RR: 16 (13 Dec 2017 15:35) (16 - 20)  SpO2: 97% (13 Dec 2017 15:35) (94% - 100%)    ________________________________________________  PHYSICAL EXAM:  GENERAL: NAD  HEENT: Normocephalic;  conjunctivae and sclerae clear; moist mucous membranes;   NECK : supple  CHEST/LUNG: Clear to auscultation bilaterally with good air entry   HEART: S1 S2  regular; no murmurs, gallops or rubs  ABDOMEN: Soft, Nontender, Nondistended; Bowel sounds present  EXTREMITIES: no cyanosis; no edema; no calf tenderness  SKIN: warm and dry; no rash  NERVOUS SYSTEM:  Awake and alert; Oriented  to place, person and time ; no new deficits    _________________________________________________  LABS:                        9.3    3.8   )-----------( 99       ( 13 Dec 2017 06:46 )             28.5     12-    144  |  110<H>  |  47<H>  ----------------------------<  103<H>  3.7   |  26  |  4.21<H>    Ca    6.8<L>      13 Dec 2017 06:46  Phos  2.7     12-12  Mg     1.9     -    TPro  6.3  /  Alb  2.8<L>  /  TBili  0.5  /  DBili  x   /  AST  76<H>  /  ALT  25  /  AlkPhos  57  12-12    PT/INR - ( 13 Dec 2017 06:46 )   PT: 10.9 sec;   INR: 1.00 ratio           Urinalysis Basic - ( 11 Dec 2017 21:24 )    Color: Yellow / Appearance: Clear / S.015 / pH: x  Gluc: x / Ketone: Negative  / Bili: Negative / Urobili: Negative   Blood: x / Protein: 500 mg/dL / Nitrite: Negative   Leuk Esterase: Negative / RBC: 10-25 /HPF / WBC 3-5 /HPF   Sq Epi: x / Non Sq Epi: Few /HPF / Bacteria: Few /HPF      CAPILLARY BLOOD GLUCOSE      POCT Blood Glucose.: 170 mg/dL (13 Dec 2017 11:31)  POCT Blood Glucose.: 91 mg/dL (13 Dec 2017 07:45)  POCT Blood Glucose.: 88 mg/dL (12 Dec 2017 21:29)  POCT Blood Glucose.: 76 mg/dL (12 Dec 2017 16:28)        RADIOLOGY & ADDITIONAL TESTS:    Imaging Personally Reviewed:  YES    Consultant(s) Notes Reviewed:   YES    Care Discussed with Consultants :     Plan of care was discussed with patient and /or primary care giver; all questions and concerns were addressed and care was aligned with patient's wishes.

## 2017-12-13 NOTE — PROGRESS NOTE ADULT - PROBLEM SELECTOR PLAN 2
-CPK is elevated to 6412, likely secondary to dehydration secondary to fevers and chills  -Increase rate of IV fluids to 100ml/hr  -Trend CPK levels -CPK is elevated to 6412, likely secondary to dehydration secondary to fevers and chills  refuses IV fluid   continue oral hydration   -Trend CPK levels

## 2017-12-13 NOTE — PROGRESS NOTE ADULT - SUBJECTIVE AND OBJECTIVE BOX
pt seen and examined.    SUBJECTIVE:  pt feels fine and denies cp,sob,gi or uremic  symptons    REVIEW OF SYSTEMS:  CONSTITUTIONAL: No weakness, fevers or chills  EYES/ENT: No visual changes;  No vertigo or throat pain   NECK: No pain or stiffness  RESPIRATORY: No cough, wheezing, hemoptysis; No shortness of breath  CARDIOVASCULAR: No chest pain or palpitations  GASTROINTESTINAL: No abdominal or epigastric pain. No nausea, vomiting, or hematemesis; No diarrhea or constipation. No melena or hematochezia.  GENITOURINARY: No dysuria, frequency , hematuria, flank pain or nocturia  NEUROLOGICAL: No numbness or weakness  SKIN: No itching, burning, rashes, or lesions   All other review of systems is negative unless indicated above    Current meds:    acetaminophen   Tablet 650 milliGRAM(s) Oral every 6 hours PRN  aspirin  chewable 81 milliGRAM(s) Oral daily  cholecalciferol 1000 Unit(s) Oral daily  cyanocobalamin 1000 MICROGram(s) Oral daily  fludroCORTISONE 0.1 milliGRAM(s) Oral two times a day  insulin lispro (HumaLOG) corrective regimen sliding scale   SubCutaneous three times a day before meals  ofloxacin 0.3% Solution 1 Drop(s) Both EYES four times a day  oseltamivir 30 milliGRAM(s) Oral daily  petrolatum Ophthalmic Ointment 1 Application(s) Both EYES four times a day  prednisoLONE acetate 1% Suspension 1 Drop(s) Both EYES two times a day  sodium chloride 0.9%. 1000 milliLiter(s) IV Continuous <Continuous>      Vital Signs    T(F): 99 (17 @ 11:28), Max: 99.3 (17 @ 08:06)  HR: 91 (17 @ 11:28) (91 - 105)  BP: 141/81 (17 @ 11:28) (141/81 - 174/99)  ABP: --  RR: 16 (17 @ 11:28) (16 - 20)  SpO2: 99% (17 @ 11:28) (94% - 100%)  Wt(kg): --  CVP(cm H2O): --  CO: --  PCWP: --    I and O's:     @ 07: @ 13:53  --------------------------------------------------------  IN:    Oral Fluid: 450 mL  Total IN: 450 mL    OUT:  Total OUT: 0 mL    Total NET: 450 mL        Daily     Daily Weight in k.7 (13 Dec 2017 05:16)    PHYSICAL EXAM:  Constitutional: well developed, Mal-nourished  and in nad  HEENT: PERRLA,  no icteric sclera and mild pallor of conjunctiva noted  Neck: No JVD, thyromegaly or adenopathy  Respiratory: reduced air entry lower lungs with no rales, wheezing or rhonchi  Cardiovascular: S1 and S2 normally heard  Gastrointestinal: soft, nondistended, nontender and normal bowel sounds heard  Extremities: No peripheral edema or cyanosis  pt has LLE Prosthesis noted  Neurological: A/O x 3, no focal deficits  Psychiatric: Normal mood, normal affect  : No flank tenderness  Skin: No rashes      LABS:    CBC:                        9.3    3.8   )-----------( 99       ( 13 Dec 2017 06:46 )             28.5       BMP:        144  |  110<H>  |  47<H>  ----------------------------<  103<H>  3.7   |  26  |  4.21<H>    EGFR: 17% -stage 4	        139  |  103  |  36<H>  ----------------------------<  61<L>  3.0<L>   |  25  |  4.08<H>      144  |  107  |  33<H>  ----------------------------<  60<L>  3.3<L>   |  29  |  4.11<H>  12    141  |  104  |  31<H>  ----------------------------<  100<H>  3.3<L>   |  30  |  4.07<H>    Ca    6.8<L>      13 Dec 2017 06:46  Ca    7.0<L>      12 Dec 2017 05:18  Ca    7.2<L>      11 Dec 2017 23:01  Ca    7.3<L>      11 Dec 2017 12:04  Phos  2.7       Mg     1.9         TPro  6.3  /  Alb  2.8<L>  /  TBili  0.5  /  DBili  x   /  AST  76<H>  /  ALT  25  /  AlkPhos  57    TPro  6.5  /  Alb  3.0<L>  /  TBili  0.7  /  DBili  x   /  AST  24  /  ALT  19  /  AlkPhos  62        Vitamin D, 25-Hydroxy: 36.6 ng/mL ( @ 09:22)  Thyroid Stimulating Hormone, Serum: 0.59 uU/mL ( @ 05:18)      URINE STUDIES:        Sodium, Random Urine: 59 mmol/L ( @ 20:02)  Potassium, Random Urine: 23 mmol/L ( @ 20:02)  Creatinine, Random Urine: 77 mg/dL ( @ 20:02)  Osmolality, Random Urine: 342 mos/kg ( @ 20:02)                  RADIOLOGY & ADDITIONAL STUDIES:    < from: Xray Chest 1 View AP -PORTABLE-Routine (17 @ 09:40) >  EXAM:  CHEST PORTABLE ROUTINE                            PROCEDURE DATE:  2017          INTERPRETATION:  Portable chest x-ray    Clinical Indication: Acute renal failure    Comparison: 2017    There is no acute pulmonary infiltrate, pleural effusion, or   pneumothorax. The trachea is midline. The cardiac silhouette is within   normal limits. No pulmonary vascular congestion.    Impression: No radiographic evidence for acute cardiopulmonary disease.   Grossly stable examination.    < end of copied text >

## 2017-12-13 NOTE — PROGRESS NOTE ADULT - ATTENDING COMMENTS
Patient seen and examined, agree with above assessment and plan as transcribed above.    - Normal echo  - CPK c/w rhabdo  - No need for tele  - Supportive care / droplet precautions per primary team    Dominic Still MD, FACC  [facility], [facility]  [address] [place] [address]  PHONE:  [phone]  BEEPER : [phone]

## 2017-12-13 NOTE — PROGRESS NOTE ADULT - SUBJECTIVE AND OBJECTIVE BOX
Subjective:  pt seen and examined, no complaints on exam.    No events overnight ,    acetaminophen   Tablet 650 milliGRAM(s) Oral every 6 hours PRN  aspirin  chewable 81 milliGRAM(s) Oral daily  cholecalciferol 1000 Unit(s) Oral daily  cyanocobalamin 1000 MICROGram(s) Oral daily  fludroCORTISONE 0.1 milliGRAM(s) Oral two times a day  insulin lispro (HumaLOG) corrective regimen sliding scale   SubCutaneous three times a day before meals  ofloxacin 0.3% Solution 1 Drop(s) Both EYES four times a day  oseltamivir 30 milliGRAM(s) Oral daily  petrolatum Ophthalmic Ointment 1 Application(s) Both EYES four times a day  prednisoLONE acetate 1% Suspension 1 Drop(s) Both EYES two times a day  sodium chloride 0.9%. 1000 milliLiter(s) IV Continuous <Continuous>                            9.3    3.8   )-----------( 99       ( 13 Dec 2017 06:46 )             28.5       Hemoglobin: 9.3 g/dL (12-13 @ 06:46)  Hemoglobin: 8.5 g/dL (12-12 @ 05:18)  Hemoglobin: 9.1 g/dL (12-11 @ 23:01)  Hemoglobin: 9.0 g/dL (12-11 @ 12:04)      12-13    144  |  110<H>  |  47<H>  ----------------------------<  103<H>  3.7   |  26  |  4.21<H>    Ca    6.8<L>      13 Dec 2017 06:46  Phos  2.7     12-12  Mg     1.9     12-12    TPro  6.3  /  Alb  2.8<L>  /  TBili  0.5  /  DBili  x   /  AST  76<H>  /  ALT  25  /  AlkPhos  57  12-12    Creatinine Trend: 4.21<--, 4.08<--, 4.11<--, 4.07<--    COAGS: PT/INR - ( 13 Dec 2017 06:46 )   PT: 10.9 sec;   INR: 1.00 ratio             CARDIAC MARKERS ( 13 Dec 2017 06:46 )  x     / x     / 7118 U/L / x     / x      CARDIAC MARKERS ( 12 Dec 2017 05:18 )  0.535 ng/mL / x     / 6412 U/L / x     / 3.0 ng/mL  CARDIAC MARKERS ( 11 Dec 2017 23:01 )  0.312 ng/mL / x     / 4882 U/L / x     / 3.7 ng/mL  CARDIAC MARKERS ( 11 Dec 2017 22:44 )  0.150 ng/mL / x     / 1779 U/L / x     / 1.7 ng/mL        T(C): 37.4 (12-13-17 @ 08:06), Max: 37.5 (12-12-17 @ 12:12)  HR: 91 (12-13-17 @ 08:06) (91 - 118)  BP: 168/96 (12-13-17 @ 08:06) (151/92 - 176/95)  RR: 18 (12-13-17 @ 08:06) (18 - 20)  SpO2: 99% (12-13-17 @ 08:06) (94% - 100%)  Wt(kg): --    I&O's Summary  	      HEENT:   Normal oral mucosa, PERRL, EOMI	  Lymphatic: No lymphadenopathy , no edema  Cardiovascular: Normal S1 S2, No JVD, No murmurs , Peripheral pulses palpable 2+ bilaterally  Respiratory: Lungs clear to auscultation, normal effort 	  Gastrointestinal:  Soft, Non-tender, + BS	      TELEMETRY:  OFF    DIAGNOSTIC TESTING:  [ ] Echocardiogram: < from: Transthoracic Echocardiogram (12.12.17 @ 08:06) >  ------------------------------------------------------------------------  CONCLUSIONS:  1. Normal Left Ventricular Systolic Function,  (EF = 55 to  60%)    ------------------------------------------------------------------------  Confirmed on  12/12/2017 - 12:26:03 by Ivan Rothman MD  ------------------------------------------------------------------------    < end of copied text >    [ ]  Catheterization:  [ ] Stress Test:    OTHER: 	  CT ABD: < from: CT Abdomen and Pelvis No Cont (12.11.17 @ 20:14) >  IMPRESSION:  Limited exam due to lack of contrast and intraperitoneal fat.    No bowel obstruction. Large amount of stool in the colon. If there is   clinical concern for GI pathology, repeat exam with oral contrast   recommended.    Age-indeterminate compression deformity of T11 and T12 vertebral bodies   with nonspecific stranding of the fat. MRI exam recommended.    Additional findings as described above      < end of copied text >        ASSESSMENT/PLAN: 	57y Male anemia, CKD, CHF, OM s/p BKA L leg, CAD, glaucoma, htn, hyperparathyroid, neuropathy, OA, bronchitis, DM presented with cough and chest pain from last 2 days found with positive biomarkers and influenza A.    echo with nl LV fx.  smoking cessation   Asa, statin   IV hydration for ? rhabdo  cont flonief  renal follow up - renal sono pending   slow titration of BP   no s/s of chf on exam   D/W Dr Still

## 2017-12-13 NOTE — PROGRESS NOTE ADULT - PROBLEM SELECTOR PLAN 2
r/o stage 3 to 4, secondary to diabetic nephropathy with heavy proteinuria  -order renal sono  -Keep patient euvolemic and renal diet  -Avoid Nephrotoxic Meds/ Agents such as (NSAIDs, IV contrast, Aminoglycosides such as gentamicin, -Gadolinium contrast, Phosphate containing enemas, etc..)  -Adjust Medications according to eGFR  -please obtain pts old serum cr from adult home to stage pts ckd

## 2017-12-13 NOTE — PROGRESS NOTE ADULT - PROBLEM SELECTOR PLAN 3
-Cr is 4.07. Patient had CKD as per charts but with unknown baseline.   -S/p 2L normal saline bolus  -Follow urine lytes and repeat BMP  -Nephro Dr rwight  -Ct scan -ve for hydronephrosis. -Cr is 4.07. Patient had CKD as per charts but with unknown baseline.   -S/p 2L normal saline bolus  -will try to get   - follow renal USG   -Nephro Dr King  -Ct scan -ve for hydronephrosis. -Cr is 4.07. Patient had CKD as per charts but with unknown baseline.   -S/p 2L normal saline bolus  -will try to get baseline Cr tomorrow  - follow renal USG   -Nephro Dr King  -Ct scan -ve for hydronephrosis.

## 2017-12-13 NOTE — PROGRESS NOTE ADULT - PROBLEM SELECTOR PLAN 1
-Patient presented with cough and fever with associated reproducible chest pain, found to have Influenza positive on RVP testing.  -Chest pain is likely secondary to cough, cardiac enzymes are negative. EKG: Sinus tach at 120 bpm. Monitor on telemetry.  -Started Tamiflu renally dosed.

## 2017-12-14 ENCOUNTER — TRANSCRIPTION ENCOUNTER (OUTPATIENT)
Age: 57
End: 2017-12-14

## 2017-12-14 LAB
ANION GAP SERPL CALC-SCNC: 9 MMOL/L — SIGNIFICANT CHANGE UP (ref 5–17)
BUN SERPL-MCNC: 41 MG/DL — HIGH (ref 7–18)
CALCIUM SERPL-MCNC: 7.3 MG/DL — LOW (ref 8.4–10.5)
CHLORIDE SERPL-SCNC: 107 MMOL/L — SIGNIFICANT CHANGE UP (ref 96–108)
CK MB BLD-MCNC: 0.1 % — SIGNIFICANT CHANGE UP (ref 0–3.5)
CK MB CFR SERPL CALC: 6.6 NG/ML — HIGH (ref 0–3.6)
CK SERPL-CCNC: 6046 U/L — HIGH (ref 35–232)
CO2 SERPL-SCNC: 26 MMOL/L — SIGNIFICANT CHANGE UP (ref 22–31)
CREAT SERPL-MCNC: 3.89 MG/DL — HIGH (ref 0.5–1.3)
GLUCOSE BLDC GLUCOMTR-MCNC: 148 MG/DL — HIGH (ref 70–99)
GLUCOSE BLDC GLUCOMTR-MCNC: 164 MG/DL — HIGH (ref 70–99)
GLUCOSE BLDC GLUCOMTR-MCNC: 174 MG/DL — HIGH (ref 70–99)
GLUCOSE SERPL-MCNC: 183 MG/DL — HIGH (ref 70–99)
HCT VFR BLD CALC: 25.9 % — LOW (ref 39–50)
HGB BLD-MCNC: 8 G/DL — LOW (ref 13–17)
MCHC RBC-ENTMCNC: 30 PG — SIGNIFICANT CHANGE UP (ref 27–34)
MCHC RBC-ENTMCNC: 31 GM/DL — LOW (ref 32–36)
MCV RBC AUTO: 96.8 FL — SIGNIFICANT CHANGE UP (ref 80–100)
PLATELET # BLD AUTO: 115 K/UL — LOW (ref 150–400)
POTASSIUM SERPL-MCNC: 3.4 MMOL/L — LOW (ref 3.5–5.3)
POTASSIUM SERPL-SCNC: 3.4 MMOL/L — LOW (ref 3.5–5.3)
RBC # BLD: 2.68 M/UL — LOW (ref 4.2–5.8)
RBC # FLD: 13.2 % — SIGNIFICANT CHANGE UP (ref 10.3–14.5)
SODIUM SERPL-SCNC: 142 MMOL/L — SIGNIFICANT CHANGE UP (ref 135–145)
WBC # BLD: 2.5 K/UL — LOW (ref 3.8–10.5)
WBC # FLD AUTO: 2.5 K/UL — LOW (ref 3.8–10.5)

## 2017-12-14 RX ADMIN — Medication 1 APPLICATION(S): at 11:56

## 2017-12-14 RX ADMIN — PREGABALIN 1000 MICROGRAM(S): 225 CAPSULE ORAL at 11:55

## 2017-12-14 RX ADMIN — Medication 1 DROP(S): at 11:56

## 2017-12-14 RX ADMIN — Medication 1000 UNIT(S): at 11:55

## 2017-12-14 RX ADMIN — FLUDROCORTISONE ACETATE 0.1 MILLIGRAM(S): 0.1 TABLET ORAL at 05:06

## 2017-12-14 RX ADMIN — Medication 1 APPLICATION(S): at 05:06

## 2017-12-14 RX ADMIN — Medication 30 MILLIGRAM(S): at 11:56

## 2017-12-14 RX ADMIN — Medication 1: at 08:16

## 2017-12-14 RX ADMIN — FLUDROCORTISONE ACETATE 0.1 MILLIGRAM(S): 0.1 TABLET ORAL at 17:31

## 2017-12-14 RX ADMIN — Medication 1 DROP(S): at 05:05

## 2017-12-14 RX ADMIN — Medication 81 MILLIGRAM(S): at 11:56

## 2017-12-14 NOTE — PHYSICAL THERAPY INITIAL EVALUATION ADULT - CRITERIA FOR SKILLED THERAPEUTIC INTERVENTIONS
therapy frequency/anticipated discharge recommendation/rehab potential/impairments found/functional limitations in following categories/predicted duration of therapy intervention

## 2017-12-14 NOTE — PROGRESS NOTE ADULT - PROBLEM SELECTOR PLAN 2
-CPK is elevated to 6412, likely secondary to dehydration secondary to fevers and chills  CPK improves to 6046  refuses IV fluid   continue oral hydration   -Trend CPK levels

## 2017-12-14 NOTE — DISCHARGE NOTE ADULT - OTHER SIGNIFICANT FINDINGS
HPI:  57 M with PMH of anemia, CKD, CHF, OM s/p BKA L leg, CAD, glaucoma, htn, hyperparathyroid, neuropathy, OA, bronchitis, DM presented with cough and chest pain from last 2 days. Cough is productive with whitish sputum, associated with nausea, subjective fevers and chills. Chest pain is on and off 8/10 located in L chest/abdominal pain (described as rib pain), reproducible with cough and pressure.   Denies SOB, nausea, vomiting, diarrhea, constipation, leg swelling, abdominal pain, headache.     Sh: Current smoker, smokes 2 cig per day, non alcoholic, non drinker.   Fh: Not available from patient. (11 Dec 2017 22:27)    Hospital course:   Cough. presented with cough and fever with associated reproducible chest pain, found to have Influenza positive on RVP testing.  -Chest pain is likely secondary to cough, cardiac enzymes are negative. EKG: Sinus tach at 120 bpm. Monitor on telemetry.  -Started Tamiflu renally dosed. - 3/5 doses given in hospital and patient advised to take 2 more days    Rhabdomyolysis. CPK is elevated to 6412, likely secondary to dehydration secondary to fevers and chills, oral hydration given with improving CPK levels    ALDA (acute kidney injury).  Cr in hospital 4.07. Patient had CKD as per charts but with baseline of 3.2    2L normal saline bolus given  -Ct scan -ve for hydronephrosis.    Troponin level elevated. Patient has elevated troponin. likely demand ischemia.     Anemia. Patient presented with Hb 9.0, denies blood in stool or black colored stool.   -Follow Anemia panel and occult blood in stools (patient refused rectal exam ).     Prophylactic measure. Plan: -Improve score 1, given immobilisation  -No indication for DVt prophylaxis.

## 2017-12-14 NOTE — DISCHARGE NOTE ADULT - CARE PROVIDER_API CALL
Dario De La Torre (MBBS), Medicine  [address] [place] [address]  Phone: [phone]  Fax: [phone] Dario De La Torre (MBKRISTY), Medicine  [address] [address] [place] [address]  Phone: [phone]  Fax: [phone]    Kobe Lambert), Nephrology  [address] [place] [address]  Phone: [phone]  Fax: [phone]

## 2017-12-14 NOTE — DISCHARGE NOTE ADULT - PATIENT PORTAL LINK FT
“You can access the Follow[employer] Patient Portal, offered by [facility], by registering with the following website: [email or web address]”

## 2017-12-14 NOTE — PROGRESS NOTE ADULT - PROBLEM SELECTOR PLAN 2
r/o stage 3 to 4, secondary to diabetic nephropathy with heavy proteinuria  -order renal sono  -Keep patient euvolemic and renal diet  -Avoid Nephrotoxic Meds/ Agents such as (NSAIDs, IV contrast, Aminoglycosides such as gentamicin, -Gadolinium contrast, Phosphate containing enemas, etc..)  -Adjust Medications according to eGFR  -please obtain pts old serum cr from adult home to stage pts ckd  -f/u pth and phos level in am r/o stage 4, secondary to diabetic nephropathy with heavy proteinuria  -order renal sono-pt refusing   -pt s baseline cr was around 3.2 as per pcp at adult home  -Keep patient euvolemic and renal diet  -Avoid Nephrotoxic Meds/ Agents such as (NSAIDs, IV contrast, Aminoglycosides such as gentamicin, -Gadolinium contrast, Phosphate containing enemas, etc..)  -Adjust Medications according to eGFR  -please obtain pts old serum cr from adult home to stage pts ckd  -f/u pth and phos level in am  -pt needs outpatient renal f/u with Dr Petra Miller in 1-2 weeks  -pt will need Access(AVF)  for future hd as pt has advanced ckd as outpatient

## 2017-12-14 NOTE — DISCHARGE NOTE ADULT - PLAN OF CARE
prevent reinfection influenza positive in RVP panel   Tamiflu for 5 day influenza positive in RVP panel   Tamiflu for 5 day- 3 days completed in hospital continue medication for 2 more days likely 2/2 to rhabdomyolysis drink plenty of water   keep your self hydrated improving   drink plenty of water   follow up with PCP resolved progressing Cr levels  follow up with nephrologist as outpatient   Cr in hospital 3.89

## 2017-12-14 NOTE — PHYSICAL THERAPY INITIAL EVALUATION ADULT - MUSCLE TONE ASSESSMENT, REHAB EVAL
normal/Noted to have muscle wasting on bilateral thenar and hypothenar eminence./bilateral upper extremities/bilateral lower extremities

## 2017-12-14 NOTE — PROGRESS NOTE ADULT - PROBLEM SELECTOR PLAN 3
-bp is ok now and it was high in am  -please titrate bp meds to keep bp,130/80  -continue  low salt diet.

## 2017-12-14 NOTE — PROGRESS NOTE ADULT - SUBJECTIVE AND OBJECTIVE BOX
pt seen and examined.    SUBJECTIVE:  pt feels fine and denies cp,sob,gi or uremic  symptons    REVIEW OF SYSTEMS:  CONSTITUTIONAL: No weakness, fevers or chills  EYES/ENT: No visual changes;  No vertigo or throat pain   NECK: No pain or stiffness  RESPIRATORY: No cough, wheezing, hemoptysis; No shortness of breath  CARDIOVASCULAR: No chest pain or palpitations  GASTROINTESTINAL: No abdominal or epigastric pain. No nausea, vomiting, or hematemesis; No diarrhea or constipation. No melena or hematochezia.  GENITOURINARY: No dysuria, frequency , hematuria, flank pain or nocturia  NEUROLOGICAL: No numbness or weakness  SKIN: No itching, burning, rashes, or lesions   All other review of systems is negative unless indicated above    Current meds:    acetaminophen   Tablet 650 milliGRAM(s) Oral every 6 hours PRN  aspirin  chewable 81 milliGRAM(s) Oral daily  cholecalciferol 1000 Unit(s) Oral daily  cyanocobalamin 1000 MICROGram(s) Oral daily  fludroCORTISONE 0.1 milliGRAM(s) Oral two times a day  insulin lispro (HumaLOG) corrective regimen sliding scale   SubCutaneous three times a day before meals  ofloxacin 0.3% Solution 1 Drop(s) Both EYES four times a day  oseltamivir 30 milliGRAM(s) Oral daily  petrolatum Ophthalmic Ointment 1 Application(s) Both EYES four times a day  prednisoLONE acetate 1% Suspension 1 Drop(s) Both EYES two times a day  sodium chloride 0.9%. 1000 milliLiter(s) IV Continuous <Continuous>      Vital Signs: noted    PHYSICAL EXAM:  Constitutional: well developed, Mal-nourished  and in nad  HEENT: PERRLA,  no icteric sclera and mild pallor of conjunctiva noted  Neck: No JVD, thyromegaly or adenopathy  Respiratory: reduced air entry lower lungs with no rales, wheezing or rhonchi  Cardiovascular: S1 and S2 normally heard  Gastrointestinal: soft, nondistended, nontender and normal bowel sounds heard  Extremities: No peripheral edema or cyanosis  pt has LLE Prosthesis noted  Neurological: A/O x 3, no focal deficits  Psychiatric: Normal mood, normal affect  : No flank tenderness  Skin: No rashes      LABS:    CBC:         Complete Blood Count (12.14.17 @ 10:03)    WBC Count: 2.5: Results verified by smear review. K/uL    RBC Count: 2.68 M/uL    Hemoglobin: 8.0 g/dL    Hematocrit: 25.9 %    Mean Cell Volume: 96.8 fl    Mean Cell Hemoglobin: 30.0 pg    Mean Cell Hemoglobin Conc: 31.0 gm/dL    Red Cell Distrib Width: 13.2 %    Platelet Count - Automated: 115 K/uL                   9.3    3.8   )-----------( 99       ( 13 Dec 2017 06:46 )             28.5       BMP:  Basic Metabolic Panel (12.14.17 @ 10:03)    Sodium, Serum: 142 mmol/L    Potassium, Serum: 3.4 mmol/L    Chloride, Serum: 107 mmol/L    Carbon Dioxide, Serum: 26 mmol/L    Anion Gap, Serum: 9 mmol/L    Blood Urea Nitrogen, Serum: 41 mg/dL    Creatinine, Serum: 3.89 mg/dL    Glucose, Serum: 183 mg/dL    Calcium, Total Serum: 7.3 mg/dL    eGFR if Non : 16: Interpretative comment    eGFR if : 19 mL/min/1.73M2    12-13    144  |  110<H>  |  47<H>  ----------------------------<  103<H>  3.7   |  26  |  4.21<H>  	    12-12    139  |  103  |  36<H>  ----------------------------<  61<L>  3.0<L>   |  25  |  4.08<H>  12-11    144  |  107  |  33<H>  ----------------------------<  60<L>  3.3<L>   |  29  |  4.11<H>  12-11    141  |  104  |  31<H>  ----------------------------<  100<H>  3.3<L>   |  30  |  4.07<H>    Ca    6.8<L>      13 Dec 2017 06:46  Ca    7.0<L>      12 Dec 2017 05:18  Ca    7.2<L>      11 Dec 2017 23:01  Ca    7.3<L>      11 Dec 2017 12:04  Phos  2.7     12-12  Mg     1.9     12-12    TPro  6.3  /  Alb  2.8<L>  /  TBili  0.5  /  DBili  x   /  AST  76<H>  /  ALT  25  /  AlkPhos  57  12-12  TPro  6.5  /  Alb  3.0<L>  /  TBili  0.7  /  DBili  x   /  AST  24  /  ALT  19  /  AlkPhos  62  12-11      Vitamin D, 25-Hydroxy: 36.6 ng/mL (12-12 @ 09:22)  Thyroid Stimulating Hormone, Serum: 0.59 uU/mL (12-12 @ 05:18)      URINE STUDIES:    Sodium, Random Urine: 59 mmol/L (12-12 @ 20:02)  Potassium, Random Urine: 23 mmol/L (12-12 @ 20:02)  Creatinine, Random Urine: 77 mg/dL (12-12 @ 20:02)  Osmolality, Random Urine: 342 mos/kg (12-12 @ 20:02) pt seen and examined.    SUBJECTIVE:  pt feels fine and denies cp,sob,gi or uremic  symptons  pt refusing iv hydration and renal sonogram    REVIEW OF SYSTEMS:  CONSTITUTIONAL: No weakness, fevers or chills  EYES/ENT: No visual changes;  No vertigo or throat pain   NECK: No pain or stiffness  RESPIRATORY: No cough, wheezing, hemoptysis; No shortness of breath  CARDIOVASCULAR: No chest pain or palpitations  GASTROINTESTINAL: No abdominal or epigastric pain. No nausea, vomiting, or hematemesis; + diarrhea , no  constipation. No melena or hematochezia.  GENITOURINARY: No dysuria, frequency , hematuria, flank pain or nocturia  NEUROLOGICAL: No numbness or weakness  SKIN: No itching, burning, rashes, or lesions   All other review of systems is negative unless indicated above    Current meds:    acetaminophen   Tablet 650 milliGRAM(s) Oral every 6 hours PRN  aspirin  chewable 81 milliGRAM(s) Oral daily  cholecalciferol 1000 Unit(s) Oral daily  cyanocobalamin 1000 MICROGram(s) Oral daily  fludroCORTISONE 0.1 milliGRAM(s) Oral two times a day  insulin lispro (HumaLOG) corrective regimen sliding scale   SubCutaneous three times a day before meals  ofloxacin 0.3% Solution 1 Drop(s) Both EYES four times a day  oseltamivir 30 milliGRAM(s) Oral daily  petrolatum Ophthalmic Ointment 1 Application(s) Both EYES four times a day  prednisoLONE acetate 1% Suspension 1 Drop(s) Both EYES two times a day  sodium chloride 0.9%. 1000 milliLiter(s) IV Continuous <Continuous>      Vital Signs: noted    PHYSICAL EXAM:  Constitutional: well developed, Mal-nourished  and in nad  HEENT: PERRLA,  no icteric sclera and mild pallor of conjunctiva noted  Neck: No JVD, thyromegaly or adenopathy  Respiratory: reduced air entry lower lungs with no rales, wheezing or rhonchi  Cardiovascular: S1 and S2 normally heard  Gastrointestinal: soft, nondistended, nontender and normal bowel sounds heard  Extremities: No peripheral edema or cyanosis  pt has LLE Prosthesis noted  Neurological: A/O x 3, no focal deficits  Psychiatric: Normal mood, normal affect  : No flank tenderness  Skin: No rashes      LABS:    CBC:         Complete Blood Count (12.14.17 @ 10:03)    WBC Count: 2.5: Results verified by smear review. K/uL    RBC Count: 2.68 M/uL    Hemoglobin: 8.0 g/dL    Hematocrit: 25.9 %    Mean Cell Volume: 96.8 fl    Mean Cell Hemoglobin: 30.0 pg    Mean Cell Hemoglobin Conc: 31.0 gm/dL    Red Cell Distrib Width: 13.2 %    Platelet Count - Automated: 115 K/uL                   9.3    3.8   )-----------( 99       ( 13 Dec 2017 06:46 )             28.5       BMP:  Basic Metabolic Panel (12.14.17 @ 10:03)    Sodium, Serum: 142 mmol/L    Potassium, Serum: 3.4 mmol/L    Chloride, Serum: 107 mmol/L    Carbon Dioxide, Serum: 26 mmol/L    Anion Gap, Serum: 9 mmol/L    Blood Urea Nitrogen, Serum: 41 mg/dL    Creatinine, Serum: 3.89 mg/dL    Glucose, Serum: 183 mg/dL    Calcium, Total Serum: 7.3 mg/dL    eGFR if Non : 16: Interpretative comment    eGFR if : 19 mL/min/1.73M2    12-13    144  |  110<H>  |  47<H>  ----------------------------<  103<H>  3.7   |  26  |  4.21<H>  	    12-12    139  |  103  |  36<H>  ----------------------------<  61<L>  3.0<L>   |  25  |  4.08<H>  12-11    144  |  107  |  33<H>  ----------------------------<  60<L>  3.3<L>   |  29  |  4.11<H>  12-11    141  |  104  |  31<H>  ----------------------------<  100<H>  3.3<L>   |  30  |  4.07<H>    Ca    6.8<L>      13 Dec 2017 06:46  Ca    7.0<L>      12 Dec 2017 05:18  Ca    7.2<L>      11 Dec 2017 23:01  Ca    7.3<L>      11 Dec 2017 12:04  Phos  2.7     12-12  Mg     1.9     12-12    TPro  6.3  /  Alb  2.8<L>  /  TBili  0.5  /  DBili  x   /  AST  76<H>  /  ALT  25  /  AlkPhos  57  12-12  TPro  6.5  /  Alb  3.0<L>  /  TBili  0.7  /  DBili  x   /  AST  24  /  ALT  19  /  AlkPhos  62  12-11      Vitamin D, 25-Hydroxy: 36.6 ng/mL (12-12 @ 09:22)  Thyroid Stimulating Hormone, Serum: 0.59 uU/mL (12-12 @ 05:18)      URINE STUDIES:    Sodium, Random Urine: 59 mmol/L (12-12 @ 20:02)  Potassium, Random Urine: 23 mmol/L (12-12 @ 20:02)  Creatinine, Random Urine: 77 mg/dL (12-12 @ 20:02)  Osmolality, Random Urine: 342 mos/kg (12-12 @ 20:02)

## 2017-12-14 NOTE — PROGRESS NOTE ADULT - PROBLEM SELECTOR PLAN 4
r/o secondary to zocor, now mildly better  -continue iv  hydration  -f/u ck daily.
r/o secondary to zocor, now mildly better  -continue iv  hydration  -f/u ck daily.
-Patient has elevated troponin. CPK elevated to 4882, could be secondary to dehydration versus CKD. EKG: No evidence of ischemia.   -Iv hydration and follow CPK level.  -Cardio Dr Still
-Patient has elevated troponin. CPK elevated to 4882, could be secondary to dehydration versus CKD. EKG: No evidence of ischemia.   -Iv hydration and follow CPK level.  -Cardio Dr Still

## 2017-12-14 NOTE — DISCHARGE NOTE ADULT - MEDICATION SUMMARY - MEDICATIONS TO TAKE
I will START or STAY ON the medications listed below when I get home from the hospital:    Florinef Acetate 0.1 mg oral tablet  -- 1 tab(s) by mouth 2 times a day  -- Indication: For Hormone supplement     aspirin 81 mg oral tablet  -- 1 tab(s) by mouth once a day  -- Indication: For Cardioprotective    Lantus 100 units/mL subcutaneous solution  -- 8 unit(s) subcutaneous once a day (at bedtime)  -- Indication: For diabetes    Zocor 40 mg oral tablet  -- 1 tab(s) by mouth once a day (at bedtime)  -- Indication: For Hyperlipidemia    Tamiflu 30 mg oral capsule  -- 1 cap(s) by mouth once a day   -- Check with your doctor before becoming pregnant.  Finish all this medication unless otherwise directed by prescriber.    -- Indication: For Cough influenza infection     Zanaflex 2 mg oral tablet  -- 2 tab(s) by mouth 2 times a day  -- Indication: For muscle tightness    Artificial Tears ophthalmic ointment  -- 1 application to each affected eye 4 times a day  -- Indication: For dry eyes    Ocuflox 0.3% ophthalmic solution  -- 1 drop(s) to each affected eye 4 times a day  -- Indication: For dry eyes    Pred Forte 1% ophthalmic suspension  -- 1 drop(s) to each affected eye 2 times a day  -- Indication: For Topical    Vitamin B-12 1000 mcg oral tablet  -- 1 tab(s) by mouth once a day  -- Indication: For vitamin supplement    Vitamin D3 1000 intl units oral tablet  -- 1 tab(s) by mouth once a day  -- Indication: For vitamin supplement

## 2017-12-14 NOTE — PROGRESS NOTE ADULT - SUBJECTIVE AND OBJECTIVE BOX
PGY 1 Note discussed with supervising resident and primary attending    Patient is a 57y old  Male who presents with a chief complaint of Cough and chest pain x 2 days (14 Dec 2017 09:57)      INTERVAL HPI/OVERNIGHT EVENTS: offers no new complaints; current symptoms resolving    MEDICATIONS  (STANDING):  aspirin  chewable 81 milliGRAM(s) Oral daily  cholecalciferol 1000 Unit(s) Oral daily  cyanocobalamin 1000 MICROGram(s) Oral daily  fludroCORTISONE 0.1 milliGRAM(s) Oral two times a day  insulin lispro (HumaLOG) corrective regimen sliding scale   SubCutaneous three times a day before meals  ofloxacin 0.3% Solution 1 Drop(s) Both EYES four times a day  oseltamivir 30 milliGRAM(s) Oral daily  petrolatum Ophthalmic Ointment 1 Application(s) Both EYES four times a day  prednisoLONE acetate 1% Suspension 1 Drop(s) Both EYES two times a day  sodium chloride 0.9%. 1000 milliLiter(s) (100 mL/Hr) IV Continuous <Continuous>    MEDICATIONS  (PRN):  acetaminophen   Tablet 650 milliGRAM(s) Oral every 6 hours PRN For Temp greater than 38 C (100.4 F)      __________________________________________________  REVIEW OF SYSTEMS:    CONSTITUTIONAL: No fever,   EYES: no acute visual disturbances  NECK: No pain or stiffness  RESPIRATORY: No cough; No shortness of breath  CARDIOVASCULAR: No chest pain, no palpitations  GASTROINTESTINAL: No pain. No nausea or vomiting; No diarrhea   NEUROLOGICAL: No headache or numbness, no tremors  MUSCULOSKELETAL: No joint pain, no muscle pain  GENITOURINARY: no dysuria, no frequency, no hesitancy  PSYCHIATRY: no depression , no anxiety  ALL OTHER  ROS negative        Vital Signs Last 24 Hrs  T(C): 36.8 (14 Dec 2017 10:50), Max: 37.2 (14 Dec 2017 08:41)  T(F): 98.3 (14 Dec 2017 10:50), Max: 98.9 (14 Dec 2017 08:41)  HR: 92 (14 Dec 2017 10:50) (80 - 98)  BP: 111/64 (14 Dec 2017 10:50) (109/69 - 170/88)  BP(mean): --  RR: 16 (14 Dec 2017 10:50) (16 - 16)  SpO2: 96% (14 Dec 2017 10:50) (94% - 100%)    ________________________________________________  PHYSICAL EXAM:  GENERAL: NAD  HEENT: Normocephalic;  conjunctivae and sclerae clear; moist mucous membranes;   NECK : supple  CHEST/LUNG: Clear to auscultation bilaterally with good air entry   HEART: S1 S2  regular; no murmurs, gallops or rubs  ABDOMEN: Soft, Nontender, Nondistended; Bowel sounds present  EXTREMITIES: no cyanosis; no edema; no calf tenderness  SKIN: warm and dry; no rash  NERVOUS SYSTEM:  Awake and alert; Oriented  to place, person and time ; no new deficits    _________________________________________________  LABS:                        8.0    2.5   )-----------( 115      ( 14 Dec 2017 10:03 )             25.9     12-14    142  |  107  |  41<H>  ----------------------------<  183<H>  3.4<L>   |  26  |  3.89<H>    Ca    7.3<L>      14 Dec 2017 10:03      PT/INR - ( 13 Dec 2017 06:46 )   PT: 10.9 sec;   INR: 1.00 ratio             CAPILLARY BLOOD GLUCOSE      POCT Blood Glucose.: 164 mg/dL (14 Dec 2017 08:09)  POCT Blood Glucose.: 175 mg/dL (13 Dec 2017 20:46)  POCT Blood Glucose.: 137 mg/dL (13 Dec 2017 16:11)        RADIOLOGY & ADDITIONAL TESTS:    Imaging Personally Reviewed:  YES    Consultant(s) Notes Reviewed:   YES    Care Discussed with Consultants :     Plan of care was discussed with patient and /or primary care giver; all questions and concerns were addressed and care was aligned with patient's wishes.

## 2017-12-14 NOTE — PROGRESS NOTE ADULT - PROBLEM SELECTOR PROBLEM 4
Non-traumatic rhabdomyolysis
Non-traumatic rhabdomyolysis
Troponin level elevated
Troponin level elevated

## 2017-12-14 NOTE — PROGRESS NOTE ADULT - PROBLEM SELECTOR PROBLEM 2
Chronic kidney disease, unspecified CKD stage
Chronic kidney disease, unspecified CKD stage
Rhabdomyolysis
Rhabdomyolysis

## 2017-12-14 NOTE — PROGRESS NOTE ADULT - PROBLEM SELECTOR PLAN 6
mild, secondary to poor po intake vs renal loses from Florinef  -f/u k level daily  -keep k >4 and <5.
mild, secondary to poor po intake vs renal loses from Florinef, now improved  -f/u k level daily  -keep k >4 and <5.
-Improve score 1, given immobilisation  -No indication for DVt prophylaxis.
-Improve score 1, given immobilisation  -No indication for DVt prophylaxis.

## 2017-12-14 NOTE — PROGRESS NOTE ADULT - PROBLEM SELECTOR PROBLEM 3
Hypertension secondary to other renal disorders
Hypertension secondary to other renal disorders
ALDA (acute kidney injury)
ALDA (acute kidney injury)

## 2017-12-14 NOTE — PROGRESS NOTE ADULT - PROBLEM SELECTOR PLAN 3
-Cr is 4.07. Patient had CKD as per charts but with unknown baseline.   -S/p 2L normal saline bolus  -will try to get baseline Cr tomorrow  - follow renal USG   -Nephro Dr King  -Ct scan -ve for hydronephrosis.

## 2017-12-14 NOTE — PROGRESS NOTE ADULT - PROBLEM SELECTOR PLAN 1
r/o secondary to pr erenal azotemia/hypovolemia, less likley ATN , pts egfr is improving  last 24 hrs  -agree with hydration  -Keep patient euvolemic and renal diet  -Avoid Nephrotoxic Meds/ Agents such as (NSAIDs, IV contrast, Aminoglycosides such as gentamicin, -Gadolinium contrast, Phosphate containing enemas, etc..)  -Adjust Medications according to eGFR  -f/u bmp daily  -f/u I/O s daily. r/o secondary to pr erenal azotemia/hypovolemia, less likley ATN , pts egfr is improving  last 24 hrs  -agree with hydration but pt refusing iv and we will continue with po hydration  -Keep patient euvolemic and renal diet  -Avoid Nephrotoxic Meds/ Agents such as (NSAIDs, IV contrast, Aminoglycosides such as gentamicin, -Gadolinium contrast, Phosphate containing enemas, etc..)  -Adjust Medications according to eGFR  -f/u bmp daily  -f/u I/O s daily.

## 2017-12-14 NOTE — PROGRESS NOTE ADULT - PROBLEM SELECTOR PLAN 5
MF  -anemia w/u  -add procrit once bp is better controlled and anemia w/u c/w ckd induced.
MF  -anemia w/u  -add procrit once bp is better controlled and anemia w/u c/w ckd induced.
-Patient presented with Hb 9.0, denies blood in stool or black colored stool.   -Follow Anemia panel and occult blood in stools (patient refusing rectal exam at this point)
-Patient presented with Hb 9.0, denies blood in stool or black colored stool.   -Follow Anemia panel and occult blood in stools (patient refusing rectal exam at this point)

## 2017-12-14 NOTE — PHYSICAL THERAPY INITIAL EVALUATION ADULT - GENERAL OBSERVATIONS, REHAB EVAL
Pt. found lying supine in NAD; cooperative and motivated during eval. Pt. is S/P left BKA and has prosthesis. Pt. is also visually impaired.

## 2017-12-14 NOTE — DISCHARGE NOTE ADULT - HOSPITAL COURSE
HPI:  57 M with PMH of anemia, CKD, CHF, OM s/p BKA L leg, CAD, glaucoma, htn, hyperparathyroid, neuropathy, OA, bronchitis, DM presented with cough and chest pain from last 2 days. Cough is productive with whitish sputum, associated with nausea, subjective fevers and chills. Chest pain is on and off 8/10 located in L chest/abdominal pain (described as rib pain), reproducible with cough and pressure.   Denies SOB, nausea, vomiting, diarrhea, constipation, leg swelling, abdominal pain, headache.     Sh: Current smoker, smokes 2 cig per day, non alcoholic, non drinker.   Fh: Not available from patient. (11 Dec 2017 22:27)

## 2017-12-14 NOTE — DISCHARGE NOTE ADULT - CARE PLAN
Principal Discharge DX:	Cough  Goal:	prevent reinfection  Instructions for follow-up, activity and diet:	influenza positive in RVP panel   Tamiflu for 5 day  Secondary Diagnosis:	ALDA (acute kidney injury)  Secondary Diagnosis:	Rhabdomyolysis  Secondary Diagnosis:	Hypokalemia Principal Discharge DX:	Cough  Goal:	prevent reinfection  Instructions for follow-up, activity and diet:	influenza positive in RVP panel   Tamiflu for 5 day- 3 days completed in hospital continue medication for 2 more days  Secondary Diagnosis:	ALDA (acute kidney injury)  Goal:	likely 2/2 to rhabdomyolysis  Instructions for follow-up, activity and diet:	drink plenty of water   keep your self hydrated  Secondary Diagnosis:	Rhabdomyolysis  Instructions for follow-up, activity and diet:	improving   drink plenty of water   follow up with PCP  Secondary Diagnosis:	Hypokalemia  Instructions for follow-up, activity and diet:	resolved Principal Discharge DX:	Cough  Goal:	prevent reinfection  Instructions for follow-up, activity and diet:	influenza positive in RVP panel   Tamiflu for 5 day- 3 days completed in hospital continue medication for 2 more days  Secondary Diagnosis:	ALDA (acute kidney injury)  Goal:	likely 2/2 to rhabdomyolysis  Instructions for follow-up, activity and diet:	drink plenty of water   keep your self hydrated  Secondary Diagnosis:	Rhabdomyolysis  Instructions for follow-up, activity and diet:	improving   drink plenty of water   follow up with PCP  Secondary Diagnosis:	Hypokalemia  Instructions for follow-up, activity and diet:	resolved  Secondary Diagnosis:	Chronic kidney disease, unspecified CKD stage  Instructions for follow-up, activity and diet:	progressing Cr levels  follow up with nephrologist as outpatient   Cr in hospital 3.89

## 2017-12-15 VITALS
OXYGEN SATURATION: 99 % | SYSTOLIC BLOOD PRESSURE: 111 MMHG | TEMPERATURE: 98 F | HEART RATE: 90 BPM | RESPIRATION RATE: 17 BRPM | DIASTOLIC BLOOD PRESSURE: 76 MMHG

## 2017-12-15 LAB
ANION GAP SERPL CALC-SCNC: 10 MMOL/L — SIGNIFICANT CHANGE UP (ref 5–17)
BUN SERPL-MCNC: 37 MG/DL — HIGH (ref 7–18)
CALCIUM SERPL-MCNC: 7.5 MG/DL — LOW (ref 8.4–10.5)
CALCIUM SERPL-MCNC: 7.7 MG/DL — LOW (ref 8.4–10.5)
CHLORIDE SERPL-SCNC: 105 MMOL/L — SIGNIFICANT CHANGE UP (ref 96–108)
CK SERPL-CCNC: 3946 U/L — HIGH (ref 35–232)
CO2 SERPL-SCNC: 27 MMOL/L — SIGNIFICANT CHANGE UP (ref 22–31)
CREAT SERPL-MCNC: 3.82 MG/DL — HIGH (ref 0.5–1.3)
GLUCOSE BLDC GLUCOMTR-MCNC: 181 MG/DL — HIGH (ref 70–99)
GLUCOSE SERPL-MCNC: 170 MG/DL — HIGH (ref 70–99)
HCT VFR BLD CALC: 29.7 % — LOW (ref 39–50)
HGB BLD-MCNC: 9.4 G/DL — LOW (ref 13–17)
MCHC RBC-ENTMCNC: 29.5 PG — SIGNIFICANT CHANGE UP (ref 27–34)
MCHC RBC-ENTMCNC: 31.5 GM/DL — LOW (ref 32–36)
MCV RBC AUTO: 93.8 FL — SIGNIFICANT CHANGE UP (ref 80–100)
PHOSPHATE SERPL-MCNC: 3.6 MG/DL — SIGNIFICANT CHANGE UP (ref 2.5–4.5)
PLATELET # BLD AUTO: 104 K/UL — LOW (ref 150–400)
POTASSIUM SERPL-MCNC: 3.3 MMOL/L — LOW (ref 3.5–5.3)
POTASSIUM SERPL-SCNC: 3.3 MMOL/L — LOW (ref 3.5–5.3)
PTH-INTACT FLD-MCNC: 235 PG/ML — HIGH (ref 15–65)
RBC # BLD: 3.17 M/UL — LOW (ref 4.2–5.8)
RBC # FLD: 12.8 % — SIGNIFICANT CHANGE UP (ref 10.3–14.5)
SODIUM SERPL-SCNC: 142 MMOL/L — SIGNIFICANT CHANGE UP (ref 135–145)
WBC # BLD: 2.5 K/UL — LOW (ref 3.8–10.5)
WBC # FLD AUTO: 2.5 K/UL — LOW (ref 3.8–10.5)

## 2017-12-15 PROCEDURE — 76775 US EXAM ABDO BACK WALL LIM: CPT

## 2017-12-15 PROCEDURE — 85027 COMPLETE CBC AUTOMATED: CPT

## 2017-12-15 PROCEDURE — 84133 ASSAY OF URINE POTASSIUM: CPT

## 2017-12-15 PROCEDURE — 83970 ASSAY OF PARATHORMONE: CPT

## 2017-12-15 PROCEDURE — 80061 LIPID PANEL: CPT

## 2017-12-15 PROCEDURE — 93306 TTE W/DOPPLER COMPLETE: CPT

## 2017-12-15 PROCEDURE — 71045 X-RAY EXAM CHEST 1 VIEW: CPT

## 2017-12-15 PROCEDURE — 82550 ASSAY OF CK (CPK): CPT

## 2017-12-15 PROCEDURE — 82607 VITAMIN B-12: CPT

## 2017-12-15 PROCEDURE — 87581 M.PNEUMON DNA AMP PROBE: CPT

## 2017-12-15 PROCEDURE — 84484 ASSAY OF TROPONIN QUANT: CPT

## 2017-12-15 PROCEDURE — 93005 ELECTROCARDIOGRAM TRACING: CPT

## 2017-12-15 PROCEDURE — 84100 ASSAY OF PHOSPHORUS: CPT

## 2017-12-15 PROCEDURE — 80048 BASIC METABOLIC PNL TOTAL CA: CPT

## 2017-12-15 PROCEDURE — 81001 URINALYSIS AUTO W/SCOPE: CPT

## 2017-12-15 PROCEDURE — 83036 HEMOGLOBIN GLYCOSYLATED A1C: CPT

## 2017-12-15 PROCEDURE — 87040 BLOOD CULTURE FOR BACTERIA: CPT

## 2017-12-15 PROCEDURE — 83935 ASSAY OF URINE OSMOLALITY: CPT

## 2017-12-15 PROCEDURE — 83735 ASSAY OF MAGNESIUM: CPT

## 2017-12-15 PROCEDURE — 85610 PROTHROMBIN TIME: CPT

## 2017-12-15 PROCEDURE — 87486 CHLMYD PNEUM DNA AMP PROBE: CPT

## 2017-12-15 PROCEDURE — 82306 VITAMIN D 25 HYDROXY: CPT

## 2017-12-15 PROCEDURE — 83605 ASSAY OF LACTIC ACID: CPT

## 2017-12-15 PROCEDURE — 84300 ASSAY OF URINE SODIUM: CPT

## 2017-12-15 PROCEDURE — 71046 X-RAY EXAM CHEST 2 VIEWS: CPT

## 2017-12-15 PROCEDURE — 83690 ASSAY OF LIPASE: CPT

## 2017-12-15 PROCEDURE — 87633 RESP VIRUS 12-25 TARGETS: CPT

## 2017-12-15 PROCEDURE — 82553 CREATINE MB FRACTION: CPT

## 2017-12-15 PROCEDURE — 82570 ASSAY OF URINE CREATININE: CPT

## 2017-12-15 PROCEDURE — 80053 COMPREHEN METABOLIC PANEL: CPT

## 2017-12-15 PROCEDURE — 97163 PT EVAL HIGH COMPLEX 45 MIN: CPT

## 2017-12-15 PROCEDURE — 76775 US EXAM ABDO BACK WALL LIM: CPT | Mod: 26

## 2017-12-15 PROCEDURE — 82310 ASSAY OF CALCIUM: CPT

## 2017-12-15 PROCEDURE — 82962 GLUCOSE BLOOD TEST: CPT

## 2017-12-15 PROCEDURE — 74176 CT ABD & PELVIS W/O CONTRAST: CPT

## 2017-12-15 PROCEDURE — 84443 ASSAY THYROID STIM HORMONE: CPT

## 2017-12-15 PROCEDURE — 87798 DETECT AGENT NOS DNA AMP: CPT

## 2017-12-15 PROCEDURE — 99285 EMERGENCY DEPT VISIT HI MDM: CPT | Mod: 25

## 2017-12-15 RX ADMIN — Medication 1: at 08:09

## 2017-12-15 NOTE — PROGRESS NOTE ADULT - SUBJECTIVE AND OBJECTIVE BOX
Subjective:  pt seen and examined, no complaints on exam.    refusing VS , agitated at times     acetaminophen   Tablet 650 milliGRAM(s) Oral every 6 hours PRN  aspirin  chewable 81 milliGRAM(s) Oral daily  cholecalciferol 1000 Unit(s) Oral daily  cyanocobalamin 1000 MICROGram(s) Oral daily  fludroCORTISONE 0.1 milliGRAM(s) Oral two times a day  insulin lispro (HumaLOG) corrective regimen sliding scale   SubCutaneous three times a day before meals  ofloxacin 0.3% Solution 1 Drop(s) Both EYES four times a day  oseltamivir 30 milliGRAM(s) Oral daily  petrolatum Ophthalmic Ointment 1 Application(s) Both EYES four times a day  prednisoLONE acetate 1% Suspension 1 Drop(s) Both EYES two times a day  sodium chloride 0.9%. 1000 milliLiter(s) IV Continuous <Continuous>                            8.0    2.5   )-----------( 115      ( 14 Dec 2017 10:03 )             25.9       Hemoglobin: 8.0 g/dL (12-14 @ 10:03)  Hemoglobin: 9.3 g/dL (12-13 @ 06:46)  Hemoglobin: 8.5 g/dL (12-12 @ 05:18)  Hemoglobin: 9.1 g/dL (12-11 @ 23:01)  Hemoglobin: 9.0 g/dL (12-11 @ 12:04)      12-14    142  |  107  |  41<H>  ----------------------------<  183<H>  3.4<L>   |  26  |  3.89<H>    Ca    7.3<L>      14 Dec 2017 10:03      Creatinine Trend: 3.89<--, 4.21<--, 4.08<--, 4.11<--, 4.07<--    COAGS:     CARDIAC MARKERS ( 14 Dec 2017 10:03 )  x     / x     / 6046 U/L / x     / 6.6 ng/mL  CARDIAC MARKERS ( 13 Dec 2017 12:50 )  x     / x     / 6864 U/L / x     / x      CARDIAC MARKERS ( 13 Dec 2017 06:46 )  0.645 ng/mL / x     / 7118 U/L / x     / 4.8 ng/mL        T(C): 36.7 (12-14-17 @ 19:00), Max: 37.2 (12-14-17 @ 08:41)  HR: 87 (12-14-17 @ 19:00) (87 - 98)  BP: 138/80 (12-14-17 @ 19:00) (111/64 - 170/88)  RR: 16 (12-14-17 @ 19:00) (16 - 16)  SpO2: 99% (12-14-17 @ 19:00) (96% - 100%)  Wt(kg): --    I&O's Summary    13 Dec 2017 07:01  -  14 Dec 2017 07:00  --------------------------------------------------------  IN: 700 mL / OUT: 0 mL / NET: 700 mL        HEENT:   Normal oral mucosa, PERRL, EOMI	  Lymphatic: No lymphadenopathy , no edema  Cardiovascular: Normal S1 S2, No JVD, No murmurs , Peripheral pulses palpable 2+ bilaterally  Respiratory: Lungs clear to auscultation, normal effort 	  Gastrointestinal:  Soft, Non-tender, + BS	      TELEMETRY:  OFF    DIAGNOSTIC TESTING:  [ ] Echocardiogram: < from: Transthoracic Echocardiogram (12.12.17 @ 08:06) >  ------------------------------------------------------------------------  CONCLUSIONS:  1. Normal Left Ventricular Systolic Function,  (EF = 55 to  60%)    ------------------------------------------------------------------------  Confirmed on  12/12/2017 - 12:26:03 by Ivan Rothman MD  ------------------------------------------------------------------------    < end of copied text >    [ ]  Catheterization:  [ ] Stress Test:    OTHER: 	  CT ABD: < from: CT Abdomen and Pelvis No Cont (12.11.17 @ 20:14) >  IMPRESSION:  Limited exam due to lack of contrast and intraperitoneal fat.    No bowel obstruction. Large amount of stool in the colon. If there is   clinical concern for GI pathology, repeat exam with oral contrast   recommended.    Age-indeterminate compression deformity of T11 and T12 vertebral bodies   with nonspecific stranding of the fat. MRI exam recommended.    Additional findings as described above      < end of copied text >        ASSESSMENT/PLAN: 	57y Male anemia, CKD, CHF, OM s/p BKA L leg, CAD, glaucoma, htn, hyperparathyroid, neuropathy, OA, bronchitis, DM presented with cough and chest pain from last 2 days found with positive biomarkers and influenza A.    echo with nl LV fx.  smoking cessation   Asa, statin   Antivirals   refusing IV fluids , enc PO hydration for rhabdo  cont flonief  renal follow up    slow titration of BP   no s/s of chf on exam   D/W Dr Still

## 2017-12-15 NOTE — PROGRESS NOTE ADULT - ATTENDING COMMENTS
Agree with above assessment and plan as outlined above.    - D/C per med    Dominic Still MD, [facility]  [facility], [facility]  [address] [place] [address]  PHONE:  [phone]  BEEPER : [phone]

## 2017-12-16 LAB
CULTURE RESULTS: SIGNIFICANT CHANGE UP
CULTURE RESULTS: SIGNIFICANT CHANGE UP
SPECIMEN SOURCE: SIGNIFICANT CHANGE UP
SPECIMEN SOURCE: SIGNIFICANT CHANGE UP

## 2018-01-18 ENCOUNTER — INPATIENT (INPATIENT)
Facility: HOSPITAL | Age: 58
LOS: 6 days | Discharge: NURSING, REHAB OR LONG-TERM CARE | DRG: 641 | End: 2018-01-25
Attending: INTERNAL MEDICINE | Admitting: INTERNAL MEDICINE
Payer: MEDICAID

## 2018-01-18 VITALS
RESPIRATION RATE: 20 BRPM | HEIGHT: 66 IN | DIASTOLIC BLOOD PRESSURE: 78 MMHG | OXYGEN SATURATION: 100 % | TEMPERATURE: 98 F | SYSTOLIC BLOOD PRESSURE: 130 MMHG | HEART RATE: 84 BPM | WEIGHT: 126.1 LBS

## 2018-01-18 DIAGNOSIS — I25.10 ATHEROSCLEROTIC HEART DISEASE OF NATIVE CORONARY ARTERY WITHOUT ANGINA PECTORIS: ICD-10-CM

## 2018-01-18 DIAGNOSIS — N18.9 CHRONIC KIDNEY DISEASE, UNSPECIFIED: ICD-10-CM

## 2018-01-18 DIAGNOSIS — Z89.512 ACQUIRED ABSENCE OF LEFT LEG BELOW KNEE: Chronic | ICD-10-CM

## 2018-01-18 DIAGNOSIS — R74.8 ABNORMAL LEVELS OF OTHER SERUM ENZYMES: ICD-10-CM

## 2018-01-18 DIAGNOSIS — E27.40 UNSPECIFIED ADRENOCORTICAL INSUFFICIENCY: ICD-10-CM

## 2018-01-18 DIAGNOSIS — E87.6 HYPOKALEMIA: ICD-10-CM

## 2018-01-18 DIAGNOSIS — H40.9 UNSPECIFIED GLAUCOMA: ICD-10-CM

## 2018-01-18 DIAGNOSIS — Z29.9 ENCOUNTER FOR PROPHYLACTIC MEASURES, UNSPECIFIED: ICD-10-CM

## 2018-01-18 DIAGNOSIS — I10 ESSENTIAL (PRIMARY) HYPERTENSION: ICD-10-CM

## 2018-01-18 DIAGNOSIS — D64.9 ANEMIA, UNSPECIFIED: ICD-10-CM

## 2018-01-18 LAB
ALBUMIN SERPL ELPH-MCNC: 2.8 G/DL — LOW (ref 3.5–5)
ALP SERPL-CCNC: 80 U/L — SIGNIFICANT CHANGE UP (ref 40–120)
ALT FLD-CCNC: 20 U/L DA — SIGNIFICANT CHANGE UP (ref 10–60)
ANION GAP SERPL CALC-SCNC: 6 MMOL/L — SIGNIFICANT CHANGE UP (ref 5–17)
AST SERPL-CCNC: 19 U/L — SIGNIFICANT CHANGE UP (ref 10–40)
BASE EXCESS BLDV CALC-SCNC: 9.3 MMOL/L — HIGH (ref -2–2)
BASOPHILS # BLD AUTO: 0.1 K/UL — SIGNIFICANT CHANGE UP (ref 0–0.2)
BASOPHILS NFR BLD AUTO: 2.1 % — HIGH (ref 0–2)
BILIRUB SERPL-MCNC: 0.6 MG/DL — SIGNIFICANT CHANGE UP (ref 0.2–1.2)
BLOOD GAS COMMENTS, VENOUS: SIGNIFICANT CHANGE UP
BUN SERPL-MCNC: 28 MG/DL — HIGH (ref 7–18)
CALCIUM SERPL-MCNC: 7.8 MG/DL — LOW (ref 8.4–10.5)
CHLORIDE SERPL-SCNC: 104 MMOL/L — SIGNIFICANT CHANGE UP (ref 96–108)
CO2 SERPL-SCNC: 34 MMOL/L — HIGH (ref 22–31)
CREAT SERPL-MCNC: 4.31 MG/DL — HIGH (ref 0.5–1.3)
EOSINOPHIL # BLD AUTO: 0.1 K/UL — SIGNIFICANT CHANGE UP (ref 0–0.5)
EOSINOPHIL NFR BLD AUTO: 2.8 % — SIGNIFICANT CHANGE UP (ref 0–6)
GLUCOSE BLDC GLUCOMTR-MCNC: 142 MG/DL — HIGH (ref 70–99)
GLUCOSE BLDC GLUCOMTR-MCNC: 144 MG/DL — HIGH (ref 70–99)
GLUCOSE SERPL-MCNC: 56 MG/DL — LOW (ref 70–99)
HCO3 BLDV-SCNC: 34 MMOL/L — HIGH (ref 21–29)
HCT VFR BLD CALC: 24.7 % — LOW (ref 39–50)
HGB BLD-MCNC: 8.2 G/DL — LOW (ref 13–17)
HOROWITZ INDEX BLDV+IHG-RTO: 21 — SIGNIFICANT CHANGE UP
LACTATE SERPL-SCNC: 0.8 MMOL/L — SIGNIFICANT CHANGE UP (ref 0.7–2)
LYMPHOCYTES # BLD AUTO: 0.3 K/UL — LOW (ref 1–3.3)
LYMPHOCYTES # BLD AUTO: 7.4 % — LOW (ref 13–44)
MCHC RBC-ENTMCNC: 30.7 PG — SIGNIFICANT CHANGE UP (ref 27–34)
MCHC RBC-ENTMCNC: 33.1 GM/DL — SIGNIFICANT CHANGE UP (ref 32–36)
MCV RBC AUTO: 92.7 FL — SIGNIFICANT CHANGE UP (ref 80–100)
MONOCYTES # BLD AUTO: 0.4 K/UL — SIGNIFICANT CHANGE UP (ref 0–0.9)
MONOCYTES NFR BLD AUTO: 11.2 % — SIGNIFICANT CHANGE UP (ref 2–14)
NEUTROPHILS # BLD AUTO: 2.7 K/UL — SIGNIFICANT CHANGE UP (ref 1.8–7.4)
NEUTROPHILS NFR BLD AUTO: 76.5 % — SIGNIFICANT CHANGE UP (ref 43–77)
PCO2 BLDV: 51 MMHG — HIGH (ref 35–50)
PH BLDV: 7.44 — SIGNIFICANT CHANGE UP (ref 7.35–7.45)
PLATELET # BLD AUTO: 132 K/UL — LOW (ref 150–400)
PO2 BLDV: SIGNIFICANT CHANGE UP MMHG (ref 25–45)
POTASSIUM SERPL-MCNC: 2.6 MMOL/L — CRITICAL LOW (ref 3.5–5.3)
POTASSIUM SERPL-SCNC: 2.6 MMOL/L — CRITICAL LOW (ref 3.5–5.3)
PROT SERPL-MCNC: 6.1 G/DL — SIGNIFICANT CHANGE UP (ref 6–8.3)
RBC # BLD: 2.66 M/UL — LOW (ref 4.2–5.8)
RBC # FLD: 14 % — SIGNIFICANT CHANGE UP (ref 10.3–14.5)
SAO2 % BLDV: 58 % — LOW (ref 67–88)
SODIUM SERPL-SCNC: 144 MMOL/L — SIGNIFICANT CHANGE UP (ref 135–145)
TROPONIN I SERPL-MCNC: 0.16 NG/ML — HIGH (ref 0–0.04)
WBC # BLD: 3.6 K/UL — LOW (ref 3.8–10.5)
WBC # FLD AUTO: 3.6 K/UL — LOW (ref 3.8–10.5)

## 2018-01-18 PROCEDURE — 99285 EMERGENCY DEPT VISIT HI MDM: CPT

## 2018-01-18 PROCEDURE — 71046 X-RAY EXAM CHEST 2 VIEWS: CPT | Mod: 26

## 2018-01-18 RX ORDER — CHOLECALCIFEROL (VITAMIN D3) 125 MCG
1000 CAPSULE ORAL DAILY
Qty: 0 | Refills: 0 | Status: DISCONTINUED | OUTPATIENT
Start: 2018-01-18 | End: 2018-01-25

## 2018-01-18 RX ORDER — SODIUM CHLORIDE 9 MG/ML
3 INJECTION INTRAMUSCULAR; INTRAVENOUS; SUBCUTANEOUS ONCE
Qty: 0 | Refills: 0 | Status: COMPLETED | OUTPATIENT
Start: 2018-01-18 | End: 2018-01-18

## 2018-01-18 RX ORDER — GLUCAGON INJECTION, SOLUTION 0.5 MG/.1ML
1 INJECTION, SOLUTION SUBCUTANEOUS ONCE
Qty: 0 | Refills: 0 | Status: DISCONTINUED | OUTPATIENT
Start: 2018-01-18 | End: 2018-01-25

## 2018-01-18 RX ORDER — POTASSIUM CHLORIDE 20 MEQ
40 PACKET (EA) ORAL EVERY 4 HOURS
Qty: 0 | Refills: 0 | Status: COMPLETED | OUTPATIENT
Start: 2018-01-18 | End: 2018-01-19

## 2018-01-18 RX ORDER — SODIUM CHLORIDE 9 MG/ML
1000 INJECTION INTRAMUSCULAR; INTRAVENOUS; SUBCUTANEOUS ONCE
Qty: 0 | Refills: 0 | Status: COMPLETED | OUTPATIENT
Start: 2018-01-18 | End: 2018-01-18

## 2018-01-18 RX ORDER — SODIUM BICARBONATE 1 MEQ/ML
1300 SYRINGE (ML) INTRAVENOUS THREE TIMES A DAY
Qty: 0 | Refills: 0 | Status: DISCONTINUED | OUTPATIENT
Start: 2018-01-18 | End: 2018-01-19

## 2018-01-18 RX ORDER — DEXTROSE 50 % IN WATER 50 %
1 SYRINGE (ML) INTRAVENOUS ONCE
Qty: 0 | Refills: 0 | Status: DISCONTINUED | OUTPATIENT
Start: 2018-01-18 | End: 2018-01-25

## 2018-01-18 RX ORDER — SIMVASTATIN 20 MG/1
40 TABLET, FILM COATED ORAL AT BEDTIME
Qty: 0 | Refills: 0 | Status: DISCONTINUED | OUTPATIENT
Start: 2018-01-18 | End: 2018-01-25

## 2018-01-18 RX ORDER — PREDNISOLONE SODIUM PHOSPHATE 1 %
1 DROPS OPHTHALMIC (EYE)
Qty: 0 | Refills: 0 | Status: DISCONTINUED | OUTPATIENT
Start: 2018-01-18 | End: 2018-01-25

## 2018-01-18 RX ORDER — ASPIRIN/CALCIUM CARB/MAGNESIUM 324 MG
81 TABLET ORAL DAILY
Qty: 0 | Refills: 0 | Status: DISCONTINUED | OUTPATIENT
Start: 2018-01-18 | End: 2018-01-25

## 2018-01-18 RX ORDER — POTASSIUM CHLORIDE 20 MEQ
40 PACKET (EA) ORAL ONCE
Qty: 0 | Refills: 0 | Status: DISCONTINUED | OUTPATIENT
Start: 2018-01-18 | End: 2018-01-18

## 2018-01-18 RX ORDER — DEXTROSE 50 % IN WATER 50 %
12.5 SYRINGE (ML) INTRAVENOUS ONCE
Qty: 0 | Refills: 0 | Status: DISCONTINUED | OUTPATIENT
Start: 2018-01-18 | End: 2018-01-25

## 2018-01-18 RX ORDER — DEXTROSE 50 % IN WATER 50 %
25 SYRINGE (ML) INTRAVENOUS ONCE
Qty: 0 | Refills: 0 | Status: DISCONTINUED | OUTPATIENT
Start: 2018-01-18 | End: 2018-01-25

## 2018-01-18 RX ORDER — DEXTROSE 50 % IN WATER 50 %
50 SYRINGE (ML) INTRAVENOUS ONCE
Qty: 0 | Refills: 0 | Status: COMPLETED | OUTPATIENT
Start: 2018-01-18 | End: 2018-01-18

## 2018-01-18 RX ORDER — SODIUM CHLORIDE 9 MG/ML
1000 INJECTION, SOLUTION INTRAVENOUS
Qty: 0 | Refills: 0 | Status: DISCONTINUED | OUTPATIENT
Start: 2018-01-18 | End: 2018-01-25

## 2018-01-18 RX ORDER — PREGABALIN 225 MG/1
1000 CAPSULE ORAL DAILY
Qty: 0 | Refills: 0 | Status: DISCONTINUED | OUTPATIENT
Start: 2018-01-18 | End: 2018-01-25

## 2018-01-18 RX ORDER — SODIUM CHLORIDE 9 MG/ML
1000 INJECTION INTRAMUSCULAR; INTRAVENOUS; SUBCUTANEOUS
Qty: 0 | Refills: 0 | Status: DISCONTINUED | OUTPATIENT
Start: 2018-01-18 | End: 2018-01-18

## 2018-01-18 RX ORDER — KETOROLAC TROMETHAMINE 30 MG/ML
30 SYRINGE (ML) INJECTION ONCE
Qty: 0 | Refills: 0 | Status: DISCONTINUED | OUTPATIENT
Start: 2018-01-18 | End: 2018-01-18

## 2018-01-18 RX ORDER — FLUDROCORTISONE ACETATE 0.1 MG/1
0.1 TABLET ORAL
Qty: 0 | Refills: 0 | Status: DISCONTINUED | OUTPATIENT
Start: 2018-01-18 | End: 2018-01-19

## 2018-01-18 RX ORDER — OFLOXACIN 0.3 %
1 DROPS OPHTHALMIC (EYE)
Qty: 0 | Refills: 0 | Status: DISCONTINUED | OUTPATIENT
Start: 2018-01-18 | End: 2018-01-25

## 2018-01-18 RX ORDER — POTASSIUM CHLORIDE 20 MEQ
10 PACKET (EA) ORAL ONCE
Qty: 0 | Refills: 0 | Status: COMPLETED | OUTPATIENT
Start: 2018-01-18 | End: 2018-01-18

## 2018-01-18 RX ORDER — SODIUM CHLORIDE 9 MG/ML
1000 INJECTION INTRAMUSCULAR; INTRAVENOUS; SUBCUTANEOUS
Qty: 0 | Refills: 0 | Status: DISCONTINUED | OUTPATIENT
Start: 2018-01-18 | End: 2018-01-19

## 2018-01-18 RX ADMIN — Medication 30 MILLIGRAM(S): at 20:06

## 2018-01-18 RX ADMIN — Medication 100 MILLIEQUIVALENT(S): at 22:55

## 2018-01-18 RX ADMIN — SODIUM CHLORIDE 3 MILLILITER(S): 9 INJECTION INTRAMUSCULAR; INTRAVENOUS; SUBCUTANEOUS at 20:06

## 2018-01-18 RX ADMIN — Medication 30 MILLIGRAM(S): at 21:07

## 2018-01-18 RX ADMIN — Medication 50 MILLILITER(S): at 22:14

## 2018-01-18 RX ADMIN — Medication 40 MILLIEQUIVALENT(S): at 21:59

## 2018-01-18 RX ADMIN — SODIUM CHLORIDE 4000 MILLILITER(S): 9 INJECTION INTRAMUSCULAR; INTRAVENOUS; SUBCUTANEOUS at 20:05

## 2018-01-18 NOTE — ED ADULT NURSE NOTE - OBJECTIVE STATEMENT
Pt c/o of 5/10 abd pain for 2wks, states he threw up last night and the pain isn't going away. Pt is observed sleeping on assessment, alert and able to answer questions correctly, no respiratory distress observed. Nursing monitoring continues.

## 2018-01-18 NOTE — H&P ADULT - PROBLEM SELECTOR PLAN 6
no beta blocker due to prolonged HI  not on BP meds at facility  monitor BP holding florinef 2/2 hypokalemia  replace  restart when K within blanquita range  monitor BP for hypotension off of steroids

## 2018-01-18 NOTE — H&P ADULT - PROBLEM SELECTOR PLAN 3
baseline Cr is 4  was told in the past that he will need AV fistula placement in preparation to have dialysis  hydration  f/u UA and urine lytes new strain of influenza on this admission  c/w tamiflu x 5 days  robitussin PRN cough  fluids

## 2018-01-18 NOTE — H&P ADULT - PROBLEM SELECTOR PLAN 9
improve score 2, immobility and age, will start heparin for dvt ppx c/w eye drops  poor vision at baseline

## 2018-01-18 NOTE — H&P ADULT - ASSESSMENT
Patient is a 57M from [facility] with PMH anemia, CKD (baseline Cr 4), CHF (last echo Dec 2017 normal), OM s/p L BKA, CAD on aspirin, glaucoma, HTN, adrenal insufficiency on florinef, sent from NH for patient reporting chest pain. Patient was seen and examined upon admission, denies fever, chills, SOB, palpitations, chest pain, nausea, diarrhea or constipation but states that he was sent due to vomiting x 1, not chest pain. Admitted for hypokalemia, ACS rule out.

## 2018-01-18 NOTE — ED PROVIDER NOTE - OBJECTIVE STATEMENT
58 y/o M pt w/ PMHx of presents to the ED c/o vomiting x3 times yesterday, once today, diarrhea and chest pain. Denies fever, chills, urinary symptoms or any other complaints. NKDA. 58 y/o M pt w/ PMHx of presents to the ED c/o vomiting x3 times yesterday, once today, diarrhea and chest pain, fever and cough. Denies urinary symptoms or any other complaints. NKDA.  Called PMD who sent him in.

## 2018-01-18 NOTE — H&P ADULT - NSHPPHYSICALEXAM_GEN_ALL_CORE
INTERVAL HPI/OVERNIGHT EVENTS:  T(C): 36.6 (01-18-18 @ 17:21), Max: 36.6 (01-18-18 @ 17:21)  HR: 84 (01-18-18 @ 17:21) (84 - 84)  BP: 130/78 (01-18-18 @ 17:21) (130/78 - 130/78)  RR: 20 (01-18-18 @ 17:21) (20 - 20)  SpO2: 100% (01-18-18 @ 17:21) (100% - 100%)    PHYSICAL EXAM:  GENERAL: NAD, well-groomed, well-developed  HEAD:  Atraumatic, Normocephalic  EYES: poor vision, conjunctiva and sclera clear  ENMT: No tonsillar erythema, exudates, or enlargement; Moist mucous membranes, Good dentition, No lesions  NECK: Supple, No JVD, Normal thyroid  NERVOUS SYSTEM:  Alert & Oriented X3, Good concentration; Motor Strength 5/5 B/L upper and lower extremities; DTRs 2+ intact and symmetric  CHEST/LUNG: Clear to percussion bilaterally; No rales, rhonchi, wheezing, or rubs  HEART: Regular rate and rhythm; No murmurs, rubs, or gallops  ABDOMEN: Soft, Nontender, Nondistended; Bowel sounds present  EXTREMITIES:  No clubbing, cyanosis, or edema; L BKA  LYMPH: No lymphadenopathy noted  SKIN: No rashes or lesions

## 2018-01-18 NOTE — H&P ADULT - PROBLEM SELECTOR PLAN 4
baseline Hb 8-9  Hb 8.2 on admission  likely ACD 2/2 CKD  f/u anemia panel baseline Cr is 4  was told in the past that he will need AV fistula placement in preparation to have dialysis  hydration  f/u UA and urine lytes baseline Cr is 4  was told in the past that he will need AV fistula placement in preparation to have dialysis  hydration  f/u UA and urine lytes  primary team to obtain vascular consult- Dr. Chaudhry for evaluation for AV fistula

## 2018-01-18 NOTE — H&P ADULT - HISTORY OF PRESENT ILLNESS
Patient is a 57M from [facility] with PMH anemia, CKD (baseline Cr 4), CHF (last echo Dec 2017 normal), OM s/p L BKA, CAD on aspirin, glaucoma, HTN, adrenal insufficiency on florinef, sent from NH for patient reporting chest pain. Patient was seen and examined upon admission, denies fever, chills, SOB, palpitations, chest pain, nausea, diarrhea or constipation but states that he was sent due to vomiting x 1, not chest pain. He was seen in [facility] in Dec 2017 for chest pain and cough, found to be + flu, finished course of tamiflu, and at that time, refused IV fluids for CKD and was advised to see outpatient nephro for AV fistula placement in preparation for dialysis. Was seen by cardio also at that time. In the ED, found to have K 2.6, given supplementation, EKG shows NSR, qtc 480, 1st deg av block 214msec. In no acute distress. Given glucagon and d50 x 1 due to FS 45, as per NH papers, has PRN glucagon order for FS <70 as patient has been having poor po intake and is on florinef, also has not eaten anything today. Patient is a 57M from [facility] with PMH anemia (baseline Hb 8-9), CKD (baseline Cr 4), CHF (last echo Dec 2017 normal), OM s/p L BKA, CAD on aspirin, glaucoma, HTN, adrenal insufficiency on florinef, sent from NH for patient reporting chest pain. Patient was seen and examined upon admission, denies fever, chills, SOB, palpitations, chest pain, nausea, diarrhea or constipation but states that he was sent due to vomiting x 1, not chest pain. He was seen in [facility] in Dec 2017 for chest pain and cough, found to be + flu, finished course of tamiflu, and at that time, refused IV fluids for CKD and was advised to see outpatient nephro for AV fistula placement in preparation for dialysis. Was seen by cardio also at that time. In the ED, found to have K 2.6, given supplementation, EKG shows NSR, qtc 480, 1st deg av block 214msec. In no acute distress. Given glucagon and d50 x 1 due to FS 45, as per NH papers, has PRN glucagon order for FS <70 as patient has been having poor po intake and is on florinef, also has not eaten anything today. Patient is a 57M from [facility] with PMH anemia (baseline Hb 8-9), CKD (baseline Cr 4), CHF (last echo Dec 2017 normal), OM s/p L BKA, CAD on aspirin, glaucoma, HTN, adrenal insufficiency on florinef, sent from NH for patient reporting chest pain. Patient was seen and examined upon admission, denies fever, chills, SOB, palpitations, chest pain, nausea, diarrhea or constipation but states that he was sent due to vomiting x 1 and continued cough, not chest pain. He was seen in [facility] in Dec 2017 for chest pain and cough, found to be + flu, finished course of tamiflu, and at that time, refused IV fluids for CKD and was advised to see outpatient nephro for AV fistula placement in preparation for dialysis. Was seen by cardio also at that time. In the ED, found to have K 2.6, given supplementation, EKG shows NSR, qtc 480, 1st deg av block 214msec. In no acute distress. Given glucagon and d50 x 1 due to FS 45, as per NH papers, has PRN glucagon order for FS <70 as patient has been having poor po intake and is on florinef, also has not eaten anything today.

## 2018-01-18 NOTE — ED ADULT NURSE NOTE - NS ED NURSE RECORD ANOTHER VITAL SIGN
Prevnar-13 vaccine administered, irma well. Instructed to wait 15mins for observation, no reaction noted @ time of discharge.   Yes

## 2018-01-18 NOTE — ED ADULT NURSE NOTE - ED STAT RN HANDOFF DETAILS 2
Endorsed to Ashlyn Oshea in stable condition. Box B in place. Pt moved to holding. Droplet precautions in place

## 2018-01-18 NOTE — ED PROVIDER NOTE - MEDICAL DECISION MAKING DETAILS
receiving potassium, elev trop unlikely mi given history of ckd, symptomatic mgmt  feels better. resp sx likely viral so sent rvp

## 2018-01-18 NOTE — H&P ADULT - PROBLEM SELECTOR PLAN 1
K 2.6 on admission  potassium 40meq x1, will repeat another dose, 10meq x 1  f/u am BMP  baseline hypokalemia on florinef, but will continue as patient has adrenal insuffiency K 2.6 on admission --> 2.9 s/p 40meq po and 1 rider 10meq  potassium 40meq x1, will repeat another dose, 10meq x 1  f/u am BMP  baseline hypokalemia on florinef, holding K 2.6 on admission --> 2.9 s/p 40meq po and 1 rider 10meq  potassium 40meq x1, will repeat another dose, 10meq x 1  f/u am BMP  baseline hypokalemia on florinef, holding  Dr. King- nephsydney

## 2018-01-18 NOTE — H&P ADULT - PMH
<<-----Click on this checkbox to enter Past Medical History Adrenal insufficiency    Anemia    CKD (chronic kidney disease)    Coronary artery disease    Glaucoma    Hypertension

## 2018-01-18 NOTE — H&P ADULT - NSHPLABSRESULTS_GEN_ALL_CORE
LABS:                        8.2    3.6   )-----------( 132      ( 18 Jan 2018 20:08 )             24.7     01-18    144  |  104  |  28<H>  ----------------------------<  56<L>  2.6<LL>   |  34<H>  |  4.31<H>    Ca    7.8<L>      18 Jan 2018 20:08    TPro  6.1  /  Alb  2.8<L>  /  TBili  0.6  /  DBili  x   /  AST  19  /  ALT  20  /  AlkPhos  80  01-18    POCT Blood Glucose.: 144 mg/dL (18 Jan 2018 23:11)  POCT Blood Glucose.: 45 mg/dL (18 Jan 2018 22:05)    < from: Xray Chest 2 Views PA/Lat (01.18.18 @ 19:55) >    PA and lateral chest x-rays are submitted for evaluation. There is no   acute pulmonary infiltrate, pleural effusion, or pneumothorax. The   cardiac silhouette is within normal limits. The trachea is midline. There   is no pulmonary vascular congestion .     Impression: No radiographic evidence for acute cardiopulmonary disease.    < end of copied text >

## 2018-01-18 NOTE — H&P ADULT - PROBLEM SELECTOR PLAN 2
trop 1: 0.157  EKG: NSR, , 1st deg av block  has hx troponemia  has CKD  no chest pain trop 1: 0.157  EKG: NSR, , 1st deg av block  has hx troponemia  has CKD  no chest pain  last echo Dec 2017 normal

## 2018-01-18 NOTE — H&P ADULT - PROBLEM SELECTOR PLAN 5
c/w florinef holding florinef 2/2 hypokalemia  replace  restart when K within blanquita range  monitor BP for hypotension off of steroids baseline Hb 8-9  Hb 8.2 on admission  likely ACD 2/2 CKD  f/u anemia panel

## 2018-01-19 DIAGNOSIS — E16.2 HYPOGLYCEMIA, UNSPECIFIED: ICD-10-CM

## 2018-01-19 DIAGNOSIS — J11.1 INFLUENZA DUE TO UNIDENTIFIED INFLUENZA VIRUS WITH OTHER RESPIRATORY MANIFESTATIONS: ICD-10-CM

## 2018-01-19 LAB
ANION GAP SERPL CALC-SCNC: 6 MMOL/L — SIGNIFICANT CHANGE UP (ref 5–17)
ANION GAP SERPL CALC-SCNC: 6 MMOL/L — SIGNIFICANT CHANGE UP (ref 5–17)
APPEARANCE UR: CLEAR — SIGNIFICANT CHANGE UP
BILIRUB UR-MCNC: NEGATIVE — SIGNIFICANT CHANGE UP
BUN SERPL-MCNC: 30 MG/DL — HIGH (ref 7–18)
BUN SERPL-MCNC: 30 MG/DL — HIGH (ref 7–18)
CALCIUM SERPL-MCNC: 7.3 MG/DL — LOW (ref 8.4–10.5)
CALCIUM SERPL-MCNC: 7.5 MG/DL — LOW (ref 8.4–10.5)
CHLORIDE SERPL-SCNC: 106 MMOL/L — SIGNIFICANT CHANGE UP (ref 96–108)
CHLORIDE SERPL-SCNC: 110 MMOL/L — HIGH (ref 96–108)
CK SERPL-CCNC: 985 U/L — HIGH (ref 35–232)
CO2 SERPL-SCNC: 28 MMOL/L — SIGNIFICANT CHANGE UP (ref 22–31)
CO2 SERPL-SCNC: 32 MMOL/L — HIGH (ref 22–31)
COLOR SPEC: YELLOW — SIGNIFICANT CHANGE UP
CREAT ?TM UR-MCNC: 105 MG/DL — SIGNIFICANT CHANGE UP
CREAT SERPL-MCNC: 3.95 MG/DL — HIGH (ref 0.5–1.3)
CREAT SERPL-MCNC: 4.19 MG/DL — HIGH (ref 0.5–1.3)
DIFF PNL FLD: ABNORMAL
FLUBV RNA SPEC QL NAA+PROBE: DETECTED
GLUCOSE BLDC GLUCOMTR-MCNC: 43 MG/DL — CRITICAL LOW (ref 70–99)
GLUCOSE BLDC GLUCOMTR-MCNC: 54 MG/DL — LOW (ref 70–99)
GLUCOSE BLDC GLUCOMTR-MCNC: 66 MG/DL — LOW (ref 70–99)
GLUCOSE SERPL-MCNC: 102 MG/DL — HIGH (ref 70–99)
GLUCOSE SERPL-MCNC: 57 MG/DL — LOW (ref 70–99)
GLUCOSE UR QL: 50 MG/DL
HCT VFR BLD CALC: 27.3 % — LOW (ref 39–50)
HGB BLD-MCNC: 8.5 G/DL — LOW (ref 13–17)
KETONES UR-MCNC: NEGATIVE — SIGNIFICANT CHANGE UP
LEUKOCYTE ESTERASE UR-ACNC: NEGATIVE — SIGNIFICANT CHANGE UP
MCHC RBC-ENTMCNC: 29.2 PG — SIGNIFICANT CHANGE UP (ref 27–34)
MCHC RBC-ENTMCNC: 31 GM/DL — LOW (ref 32–36)
MCV RBC AUTO: 94.2 FL — SIGNIFICANT CHANGE UP (ref 80–100)
NITRITE UR-MCNC: NEGATIVE — SIGNIFICANT CHANGE UP
OSMOLALITY UR: 293 MOS/KG — SIGNIFICANT CHANGE UP (ref 50–1200)
PCP SPEC-MCNC: SIGNIFICANT CHANGE UP
PH UR: 6 — SIGNIFICANT CHANGE UP (ref 5–8)
PLATELET # BLD AUTO: 120 K/UL — LOW (ref 150–400)
POTASSIUM SERPL-MCNC: 2.9 MMOL/L — CRITICAL LOW (ref 3.5–5.3)
POTASSIUM SERPL-MCNC: 4.5 MMOL/L — SIGNIFICANT CHANGE UP (ref 3.5–5.3)
POTASSIUM SERPL-SCNC: 2.9 MMOL/L — CRITICAL LOW (ref 3.5–5.3)
POTASSIUM SERPL-SCNC: 4.5 MMOL/L — SIGNIFICANT CHANGE UP (ref 3.5–5.3)
POTASSIUM UR-SCNC: 14 MMOL/L — LOW (ref 25–125)
PROT ?TM UR-MCNC: 266 MG/DL — HIGH (ref 0–12)
PROT UR-MCNC: 500 MG/DL
RAPID RVP RESULT: DETECTED
RBC # BLD: 2.9 M/UL — LOW (ref 4.2–5.8)
RBC # FLD: 14.1 % — SIGNIFICANT CHANGE UP (ref 10.3–14.5)
SODIUM SERPL-SCNC: 144 MMOL/L — SIGNIFICANT CHANGE UP (ref 135–145)
SODIUM SERPL-SCNC: 144 MMOL/L — SIGNIFICANT CHANGE UP (ref 135–145)
SODIUM UR-SCNC: 53 MMOL/L — SIGNIFICANT CHANGE UP (ref 40–220)
SP GR SPEC: 1.01 — SIGNIFICANT CHANGE UP (ref 1.01–1.02)
UROBILINOGEN FLD QL: NEGATIVE — SIGNIFICANT CHANGE UP
WBC # BLD: 3.8 K/UL — SIGNIFICANT CHANGE UP (ref 3.8–10.5)
WBC # FLD AUTO: 3.8 K/UL — SIGNIFICANT CHANGE UP (ref 3.8–10.5)

## 2018-01-19 RX ORDER — HYDRALAZINE HCL 50 MG
10 TABLET ORAL ONCE
Qty: 0 | Refills: 0 | Status: COMPLETED | OUTPATIENT
Start: 2018-01-19 | End: 2018-01-19

## 2018-01-19 RX ORDER — SODIUM CHLORIDE 9 MG/ML
1000 INJECTION, SOLUTION INTRAVENOUS
Qty: 0 | Refills: 0 | Status: DISCONTINUED | OUTPATIENT
Start: 2018-01-19 | End: 2018-01-19

## 2018-01-19 RX ORDER — POTASSIUM CHLORIDE 20 MEQ
10 PACKET (EA) ORAL ONCE
Qty: 0 | Refills: 0 | Status: COMPLETED | OUTPATIENT
Start: 2018-01-19 | End: 2018-01-19

## 2018-01-19 RX ORDER — ONDANSETRON 8 MG/1
4 TABLET, FILM COATED ORAL ONCE
Qty: 0 | Refills: 0 | Status: COMPLETED | OUTPATIENT
Start: 2018-01-19 | End: 2018-01-19

## 2018-01-19 RX ORDER — DEXTROSE MONOHYDRATE, SODIUM CHLORIDE, AND POTASSIUM CHLORIDE 50; .745; 4.5 G/1000ML; G/1000ML; G/1000ML
1000 INJECTION, SOLUTION INTRAVENOUS
Qty: 0 | Refills: 0 | Status: DISCONTINUED | OUTPATIENT
Start: 2018-01-19 | End: 2018-01-19

## 2018-01-19 RX ORDER — DEXTROSE MONOHYDRATE, SODIUM CHLORIDE, AND POTASSIUM CHLORIDE 50; .745; 4.5 G/1000ML; G/1000ML; G/1000ML
1000 INJECTION, SOLUTION INTRAVENOUS
Qty: 0 | Refills: 0 | Status: DISCONTINUED | OUTPATIENT
Start: 2018-01-19 | End: 2018-01-20

## 2018-01-19 RX ORDER — ONDANSETRON 8 MG/1
4 TABLET, FILM COATED ORAL ONCE
Qty: 0 | Refills: 0 | Status: COMPLETED | OUTPATIENT
Start: 2018-01-19 | End: 2018-01-20

## 2018-01-19 RX ORDER — POTASSIUM CHLORIDE 20 MEQ
40 PACKET (EA) ORAL EVERY 4 HOURS
Qty: 0 | Refills: 0 | Status: COMPLETED | OUTPATIENT
Start: 2018-01-19 | End: 2018-01-19

## 2018-01-19 RX ORDER — DEXTROSE 50 % IN WATER 50 %
50 SYRINGE (ML) INTRAVENOUS ONCE
Qty: 0 | Refills: 0 | Status: COMPLETED | OUTPATIENT
Start: 2018-01-19 | End: 2018-01-19

## 2018-01-19 RX ORDER — ACETAMINOPHEN 500 MG
650 TABLET ORAL EVERY 6 HOURS
Qty: 0 | Refills: 0 | Status: DISCONTINUED | OUTPATIENT
Start: 2018-01-19 | End: 2018-01-25

## 2018-01-19 RX ORDER — POTASSIUM CHLORIDE 20 MEQ
10 PACKET (EA) ORAL
Qty: 0 | Refills: 0 | Status: DISCONTINUED | OUTPATIENT
Start: 2018-01-19 | End: 2018-01-19

## 2018-01-19 RX ADMIN — Medication 30 MILLIGRAM(S): at 15:12

## 2018-01-19 RX ADMIN — Medication 1 DROP(S): at 15:11

## 2018-01-19 RX ADMIN — PREGABALIN 1000 MICROGRAM(S): 225 CAPSULE ORAL at 15:10

## 2018-01-19 RX ADMIN — Medication 1 DROP(S): at 05:35

## 2018-01-19 RX ADMIN — ONDANSETRON 4 MILLIGRAM(S): 8 TABLET, FILM COATED ORAL at 10:03

## 2018-01-19 RX ADMIN — Medication 1300 MILLIGRAM(S): at 05:35

## 2018-01-19 RX ADMIN — Medication 100 MILLIEQUIVALENT(S): at 03:33

## 2018-01-19 RX ADMIN — SIMVASTATIN 40 MILLIGRAM(S): 20 TABLET, FILM COATED ORAL at 22:15

## 2018-01-19 RX ADMIN — Medication 1000 UNIT(S): at 15:10

## 2018-01-19 RX ADMIN — Medication 10 MILLIGRAM(S): at 17:09

## 2018-01-19 RX ADMIN — Medication 1 DROP(S): at 02:00

## 2018-01-19 RX ADMIN — Medication 40 MILLIEQUIVALENT(S): at 02:02

## 2018-01-19 RX ADMIN — Medication 40 MILLIEQUIVALENT(S): at 09:53

## 2018-01-19 RX ADMIN — Medication 40 MILLIEQUIVALENT(S): at 05:35

## 2018-01-19 RX ADMIN — Medication 40 MILLIEQUIVALENT(S): at 03:33

## 2018-01-19 RX ADMIN — Medication 50 MILLILITER(S): at 17:06

## 2018-01-19 RX ADMIN — Medication 81 MILLIGRAM(S): at 15:10

## 2018-01-19 RX ADMIN — SODIUM CHLORIDE 50 MILLILITER(S): 9 INJECTION, SOLUTION INTRAVENOUS at 06:53

## 2018-01-19 RX ADMIN — SODIUM CHLORIDE 50 MILLILITER(S): 9 INJECTION INTRAMUSCULAR; INTRAVENOUS; SUBCUTANEOUS at 02:00

## 2018-01-19 NOTE — PROGRESS NOTE ADULT - SUBJECTIVE AND OBJECTIVE BOX
NP Note discussed with  primary attending    Patient is a 57M from [facility] with PMH anemia, CKD (baseline Cr 4), CHF (last echo Dec 2017 normal), OM s/p L BKA, CAD on aspirin, glaucoma, HTN, adrenal insufficiency admitted for hyperkalemia, chest pain and Influenza. Patietn seen at bedside, alert, awake, co nausea, vomited x 2 (NBNB), + low grade fever, + hypoglycemia. Denies abdominal pain, chest pain, diarrhea, headache SOB.               INTERVAL HPI/OVERNIGHT EVENTS: no new complaints    MEDICATIONS  (STANDING):  aspirin  chewable 81 milliGRAM(s) Oral daily  cholecalciferol 1000 Unit(s) Oral daily  cyanocobalamin 1000 MICROGram(s) Oral daily  dextrose 5% + sodium chloride 0.9% with potassium chloride 20 mEq/L 1000 milliLiter(s) (60 mL/Hr) IV Continuous <Continuous>  dextrose 5%. 1000 milliLiter(s) (50 mL/Hr) IV Continuous <Continuous>  dextrose 50% Injectable 12.5 Gram(s) IV Push once  dextrose 50% Injectable 25 Gram(s) IV Push once  dextrose 50% Injectable 25 Gram(s) IV Push once  dextrose 50% Injectable 50 milliLiter(s) IV Push once  hydrALAZINE Injectable 10 milliGRAM(s) IV Push once  ofloxacin 0.3% Solution 1 Drop(s) Both EYES four times a day  oseltamivir 30 milliGRAM(s) Oral daily  petrolatum Ophthalmic Ointment 1 Application(s) Both EYES four times a day  prednisoLONE acetate 1% Suspension 1 Drop(s) Both EYES two times a day  simvastatin 40 milliGRAM(s) Oral at bedtime  sodium bicarbonate 1300 milliGRAM(s) Oral three times a day    MEDICATIONS  (PRN):  dextrose Gel 1 Dose(s) Oral once PRN Blood Glucose LESS THAN 70 milliGRAM(s)/deciliter  glucagon  Injectable 1 milliGRAM(s) IntraMuscular once PRN Glucose LESS THAN 70 milligrams/deciliter  glucagon  Injectable 1 milliGRAM(s) IntraMuscular once PRN Glucose LESS THAN 70 milligrams/deciliter  guaiFENesin   Syrup  (Sugar-Free) 200 milliGRAM(s) Oral every 6 hours PRN Cough      __________________________________________________  REVIEW OF SYSTEMS:    CONSTITUTIONAL: No fever,   RESPIRATORY: + cough; No shortness of breath  CARDIOVASCULAR: No chest pain, no palpitations  GASTROINTESTINAL: No pain. + nausea +vomiting; anorexia, No diarrhea   NEUROLOGICAL: No headache or numbness, no tremors  MUSCULOSKELETAL: No joint pain, + muscle pain  GENITOURINARY: no dysuria, no frequency, no hesitancy  e        Vital Signs Last 24 Hrs  T(C): 37.8 (2018 15:43), Max: 37.9 (2018 12:12)  T(F): 100.1 (2018 15:43), Max: 100.3 (2018 12:12)  HR: 113 (2018 15:43) (84 - 115)  BP: 187/90 (2018 15:43) (130/78 - 189/92)  BP(mean): --  RR: 20 (2018 15:43) (20 - 20)  SpO2: 97% (2018 15:43) (96% - 100%)    ________________________________________________  PHYSICAL EXAM:  GENERAL: NAD  CHEST/LUNG: clear   HEART: S1 S2  regular;   ABDOMEN: Soft, Nontender, Nondistended; Bowel sounds present, no R/G   EXTREMITIES: no cyanosis; no edema; no calf tenderness  SKIN: warm and dry; no rash  NERVOUS SYSTEM:  Awake and alert; Oriented  to place, person and time ; no new deficits    _________________________________________________  LABS:                        8.5    3.8   )-----------( 120      ( 2018 09:40 )             27.3     -    144  |  110<H>  |  30<H>  ----------------------------<  57<L>  4.5   |  28  |  3.95<H>    Ca    7.5<L>      2018 09:40    TPro  6.1  /  Alb  2.8<L>  /  TBili  0.6  /  DBili  x   /  AST  19  /  ALT  20  /  AlkPhos  80  01-18      Urinalysis Basic - ( 2018 02:38 )    Color: Yellow / Appearance: Clear / S.015 / pH: x  Gluc: x / Ketone: Negative  / Bili: Negative / Urobili: Negative   Blood: x / Protein: 500 mg/dL / Nitrite: Negative   Leuk Esterase: Negative / RBC: 2-5 /HPF / WBC 0-2 /HPF   Sq Epi: x / Non Sq Epi: Few /HPF / Bacteria: Few /HPF      CAPILLARY BLOOD GLUCOSE      POCT Blood Glucose.: 43 mg/dL (2018 15:39)  POCT Blood Glucose.: 54 mg/dL (2018 11:07)  POCT Blood Glucose.: 66 mg/dL (2018 08:08)  POCT Blood Glucose.: 142 mg/dL (2018 23:56)  POCT Blood Glucose.: 144 mg/dL (2018 23:11)  POCT Blood Glucose.: 45 mg/dL (2018 22:05)        RADIOLOGY & ADDITIONAL TESTS:    Imaging Personally Reviewed:  YES/NO    Consultant(s) Notes Reviewed:   YES/ No    Care Discussed with Consultants :     Plan of care was discussed with patient and /or primary care giver; all questions and concerns were addressed and care was aligned with patient's wishes.

## 2018-01-19 NOTE — PROGRESS NOTE ADULT - PROBLEM SELECTOR PLAN 4
likely due to vomiitng and poor oral intake   D50% given   c/w IV fluids D5W+NS+20meq potassium   monitor BS likely due to vomiitng and poor oral intake   D50% for BS 43mg/dl   c/w IV fluids D5W+NS+20meq potassium   monitor BS

## 2018-01-20 LAB — GLUCOSE BLDC GLUCOMTR-MCNC: 74 MG/DL — SIGNIFICANT CHANGE UP (ref 70–99)

## 2018-01-20 RX ORDER — SODIUM CHLORIDE 9 MG/ML
1000 INJECTION, SOLUTION INTRAVENOUS
Qty: 0 | Refills: 0 | Status: DISCONTINUED | OUTPATIENT
Start: 2018-01-20 | End: 2018-01-22

## 2018-01-20 RX ORDER — METOPROLOL TARTRATE 50 MG
50 TABLET ORAL
Qty: 0 | Refills: 0 | Status: DISCONTINUED | OUTPATIENT
Start: 2018-01-20 | End: 2018-01-25

## 2018-01-20 RX ADMIN — Medication 81 MILLIGRAM(S): at 16:34

## 2018-01-20 RX ADMIN — Medication 200 MILLIGRAM(S): at 23:08

## 2018-01-20 RX ADMIN — ONDANSETRON 4 MILLIGRAM(S): 8 TABLET, FILM COATED ORAL at 00:11

## 2018-01-20 RX ADMIN — PREGABALIN 1000 MICROGRAM(S): 225 CAPSULE ORAL at 16:34

## 2018-01-20 RX ADMIN — Medication 30 MILLIGRAM(S): at 16:35

## 2018-01-20 RX ADMIN — Medication 1000 UNIT(S): at 16:34

## 2018-01-20 RX ADMIN — SIMVASTATIN 40 MILLIGRAM(S): 20 TABLET, FILM COATED ORAL at 23:07

## 2018-01-20 RX ADMIN — Medication 50 MILLIGRAM(S): at 16:36

## 2018-01-20 RX ADMIN — DEXTROSE MONOHYDRATE, SODIUM CHLORIDE, AND POTASSIUM CHLORIDE 60 MILLILITER(S): 50; .745; 4.5 INJECTION, SOLUTION INTRAVENOUS at 00:11

## 2018-01-20 NOTE — PROGRESS NOTE ADULT - ASSESSMENT
A/P:  1.CKD:stage 4 ,secondary to dm/htn/smoking.pts renal function is around his baseline  -suggest to  gentle iv hydration as pt may have upper gi bleed  -Keep patient euvolemic and renal diet  -Avoid Nephrotoxic Meds/ Agents such as (NSAIDs, IV contrast, Aminoglycosides such as gentamicin, -Gadolinium contrast, Phosphate containing enemas, etc..)  -Adjust Medications according to eGFR  -f/u bmp daily  2.Hypokalemia:now k is nl  -we will check k now as pt is on iv k in iv fluid  -keep K >4 and <5  -f/u K level this pm  3.Anemia:MF  -may need procrit once bp is controlled  -f/u Hb daily  -w/u for gi bleed  4.Htn: BP is high   -we will titrate bp meds as ordered to keep sbp<130  -avoid [place]3  5.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

## 2018-01-20 NOTE — PROGRESS NOTE ADULT - SUBJECTIVE AND OBJECTIVE BOX
Patient is a 57y old  Male who presents with a chief complaint of chest pain (18 Jan 2018 22:58)    PATIENT IS SEEN AND EXAMINED IN MEDICAL FLOOR.    ALLERGIES:  fish (Rash)  liver (Anaphylaxis)  No Known Drug Allergies    VITALS:    Vital Signs Last 24 Hrs  T(C): 36.9 (20 Jan 2018 16:27), Max: 38.1 (19 Jan 2018 22:30)  T(F): 98.4 (20 Jan 2018 16:27), Max: 100.5 (19 Jan 2018 22:30)  HR: 99 (20 Jan 2018 16:27) (99 - 114)  BP: 166/97 (20 Jan 2018 16:27) (157/102 - 184/106)  BP(mean): --  RR: 18 (20 Jan 2018 16:27) (18 - 20)  SpO2: 100% (20 Jan 2018 16:27) (95% - 100%)    LABS:  CBC Full  -  ( 19 Jan 2018 09:40 )  WBC Count : 3.8 K/uL  Hemoglobin : 8.5 g/dL  Hematocrit : 27.3 %  Platelet Count - Automated : 120 K/uL  Mean Cell Volume : 94.2 fl  Mean Cell Hemoglobin : 29.2 pg  Mean Cell Hemoglobin Concentration : 31.0 gm/dL  Auto Neutrophil # : x  Auto Lymphocyte # : x  Auto Monocyte # : x  Auto Eosinophil # : x  Auto Basophil # : x  Auto Neutrophil % : x  Auto Lymphocyte % : x  Auto Monocyte % : x  Auto Eosinophil % : x  Auto Basophil % : x      01-19    144  |  110<H>  |  30<H>  ----------------------------<  57<L>  4.5   |  28  |  3.95<H>    Ca    7.5<L>      19 Jan 2018 09:40    TPro  6.1  /  Alb  2.8<L>  /  TBili  0.6  /  DBili  x   /  AST  19  /  ALT  20  /  AlkPhos  80  01-18    CAPILLARY BLOOD GLUCOSE      POCT Blood Glucose.: 74 mg/dL (20 Jan 2018 00:30)    CARDIAC MARKERS ( 19 Jan 2018 09:40 )  x     / x     / 985 U/L / x     / x      CARDIAC MARKERS ( 18 Jan 2018 20:08 )  0.157 ng/mL / x     / 734 U/L / x     / x          LIVER FUNCTIONS - ( 18 Jan 2018 20:08 )  Alb: 2.8 g/dL / Pro: 6.1 g/dL / ALK PHOS: 80 U/L / ALT: 20 U/L DA / AST: 19 U/L / GGT: x                 .Blood Blood-Peripheral  12-11 @ 18:56   No growth at 5 days.  --  --          MEDICATIONS:    MEDICATIONS  (STANDING):  aspirin  chewable 81 milliGRAM(s) Oral daily  cholecalciferol 1000 Unit(s) Oral daily  cyanocobalamin 1000 MICROGram(s) Oral daily  dextrose 5% + sodium chloride 0.9% with potassium chloride 20 mEq/L 1000 milliLiter(s) (60 mL/Hr) IV Continuous <Continuous>  dextrose 5%. 1000 milliLiter(s) (50 mL/Hr) IV Continuous <Continuous>  dextrose 50% Injectable 12.5 Gram(s) IV Push once  dextrose 50% Injectable 25 Gram(s) IV Push once  dextrose 50% Injectable 25 Gram(s) IV Push once  metoprolol     tartrate 50 milliGRAM(s) Oral two times a day  ofloxacin 0.3% Solution 1 Drop(s) Both EYES four times a day  oseltamivir 30 milliGRAM(s) Oral daily  petrolatum Ophthalmic Ointment 1 Application(s) Both EYES four times a day  prednisoLONE acetate 1% Suspension 1 Drop(s) Both EYES two times a day  simvastatin 40 milliGRAM(s) Oral at bedtime      MEDICATIONS  (PRN):  acetaminophen   Tablet 650 milliGRAM(s) Oral every 6 hours PRN For Temp greater than 38 C (100.4 F)  dextrose Gel 1 Dose(s) Oral once PRN Blood Glucose LESS THAN 70 milliGRAM(s)/deciliter  glucagon  Injectable 1 milliGRAM(s) IntraMuscular once PRN Glucose LESS THAN 70 milligrams/deciliter  glucagon  Injectable 1 milliGRAM(s) IntraMuscular once PRN Glucose LESS THAN 70 milligrams/deciliter  guaiFENesin   Syrup  (Sugar-Free) 200 milliGRAM(s) Oral every 6 hours PRN Cough      REVIEW OF SYSTEMS:                           ALL ROS DONE [ X   ]    CONSTITUTIONAL:  LETHARGIC [   ], FEVER [   ], UNRESPONSIVE [   ]  CVS:  CP  [   ], SOB, [   ], PALPITATIONS [   ], DIZZYNESS [   ]  RS: COUGH [   ], SPUTUM [   ]  GI: ABDOMINAL PAIN [   ], NAUSEA [   ], VOMITINGS [   ], DIARRHEA [   ], CONSTIPATION [   ]  :  DYSURIA [   ], NOCTURIA [   ], INCREASED FREQUENCY [   ], DRIBLING [   ],  SKELETAL: PAINFUL JOINTS [   ], SWOLLEN JOINTS [   ], NECK ACHE [   ], LOW BACK ACHE [   ],  SKIN : ULCERS [   ], RASH [   ], ITCHING [   ]  CNS: HEAD ACHE [   ], DOUBLE VISION [   ], BLURRED VISION [   ], AMS / CONFUSION [   ], SEIZURES [   ], WEAKNESS [   ],TINGLING / NUMBNESS [   ]    PHYSICAL EXAMINATION:  GENERAL APPEARANCE: NO DISTRESS  HEENT:   PALLOR +,  NO  JVD,  NO   NODES, NECK SUPPLE  CVS: S1 +, S2 +,   RS: AEEB,  OCCASIONAL  RALES +,  B/L MILD RONCHI +  ABD: SOFT, NT, NO, BS +  EXT: NO PE, LEFT BKA   SKIN: WARM,   SKELETAL:  ROM ACCEPTABLE  CNS:  AAO X 3   ,NO   DEFICITS    RADIOLOGY :      ASSESSMENT :     Hypokalemia  Coronary artery disease  Glaucoma  Anemia  CKD (chronic kidney disease)  Adrenal insufficiency  Hypertension  No pertinent past medical history  Below knee amputation status, left  No significant past surgical history      PLAN:  HPI:  Patient is a 57M from [facility] with PMH anemia (baseline Hb 8-9), CKD (baseline Cr 4), CHF (last echo Dec 2017 normal), OM s/p L BKA, CAD on aspirin, glaucoma, HTN, adrenal insufficiency on florinef, sent from NH for patient reporting chest pain. Patient was seen and examined upon admission, denies fever, chills, SOB, palpitations, chest pain, nausea, diarrhea or constipation but states that he was sent due to vomiting x 1 and continued cough, not chest pain. He was seen in [facility] in Dec 2017 for chest pain and cough, found to be + flu, finished course of tamiflu, and at that time, refused IV fluids for CKD and was advised to see outpatient nephro for AV fistula placement in preparation for dialysis. Was seen by cardio also at that time. In the ED, found to have K 2.6, given supplementation, EKG shows NSR, qtc 480, 1st deg av block 214msec. In no acute distress. Given glucagon and d50 x 1 due to FS 45, as per NH papers, has PRN glucagon order for FS <70 as patient has been having poor po intake and is on florinef, also has not eaten anything today. (18 Jan 2018 22:58)    - INFLUENZA B URTI, ACUTE BRONCHITIS ON TAMIFLU, ADD NEBS  - CKD STAGE 4, ANEMIA OF CKD - VASCULAR TEAM TO CONSIDER PLACING AVF ( DR. JEFFERSON ), RENAL F/UP IS IN PROGRESS.  - D/W PATIENT AND STAFF  - DR. MAC

## 2018-01-20 NOTE — PROGRESS NOTE ADULT - SUBJECTIVE AND OBJECTIVE BOX
pt seen and examined.pts current chart reviewed and case discussed with resident covering.    SUBJECTIVE:  pt feels fine and denies cp,sob,gi or gu/uremic  symptons    REVIEW OF SYSTEMS:  CONSTITUTIONAL: No weakness, fevers or chills  EYES/ENT: No visual changes;  No vertigo or throat pain   NECK: No pain or stiffness  RESPIRATORY: No cough, wheezing, hemoptysis; No shortness of breath  CARDIOVASCULAR: No chest pain or palpitations  GASTROINTESTINAL: No abdominal or epigastric pain. No nausea, vomiting, or hematemesis; No diarrhea or constipation. No melena or hematochezia.  GENITOURINARY: No dysuria, frequency , hematuria, flank pain or nocturia  NEUROLOGICAL: No numbness or weakness  SKIN: No itching, burning, rashes, or lesions   All other review of systems is negative unless indicated above    Current meds:    acetaminophen   Tablet 650 milliGRAM(s) Oral every 6 hours PRN  aspirin  chewable 81 milliGRAM(s) Oral daily  cholecalciferol 1000 Unit(s) Oral daily  cyanocobalamin 1000 MICROGram(s) Oral daily  dextrose 5% + sodium chloride 0.9% with potassium chloride 20 mEq/L 1000 milliLiter(s) IV Continuous <Continuous>  dextrose 5%. 1000 milliLiter(s) IV Continuous <Continuous>  dextrose 50% Injectable 12.5 Gram(s) IV Push once  dextrose 50% Injectable 25 Gram(s) IV Push once  dextrose 50% Injectable 25 Gram(s) IV Push once  dextrose Gel 1 Dose(s) Oral once PRN  glucagon  Injectable 1 milliGRAM(s) IntraMuscular once PRN  glucagon  Injectable 1 milliGRAM(s) IntraMuscular once PRN  guaiFENesin   Syrup  (Sugar-Free) 200 milliGRAM(s) Oral every 6 hours PRN  metoprolol     tartrate 50 milliGRAM(s) Oral two times a day  ofloxacin 0.3% Solution 1 Drop(s) Both EYES four times a day  oseltamivir 30 milliGRAM(s) Oral daily  petrolatum Ophthalmic Ointment 1 Application(s) Both EYES four times a day  prednisoLONE acetate 1% Suspension 1 Drop(s) Both EYES two times a day  simvastatin 40 milliGRAM(s) Oral at bedtime      Vital Signs    T(F): 98.8 (01-20-18 @ 20:25), Max: 100.5 (01-19-18 @ 22:30)  HR: 77 (01-20-18 @ 20:25) (77 - 114)  BP: 150/91 (01-20-18 @ 20:25) (150/91 - 184/106)  ABP: --  RR: 16 (01-20-18 @ 20:25) (16 - 20)  SpO2: 97% (01-20-18 @ 20:25) (95% - 100%)  Wt(kg): --  CVP(cm H2O): --  CO: --  PCWP: --    I and O's:    Daily     Daily     PHYSICAL EXAM:  Constitutional: well developed, well nourished  and in nad  HEENT: PERRLA,  no icteric sclera and mild pallor of conjunctiva noted  Neck: No JVD, thyromegaly or adenopathy  Respiratory: reduced air entry lower lungs with no rales, wheezing or rhonchi  Cardiovascular: S1 and S2 normally heard  Gastrointestinal: soft, nondistended, nontender and normal bowel sounds heard  Extremities: No peripheral edema or cyanosis  Neurological: A/O x 3, no focal deficits  : No flank or cva tenderness palpated.  Skin: No rashes      LABS:    CBC:                          8.5    3.8   )-----------( 120      ( 19 Jan 2018 09:40 )             27.3           BMP:    01-19    144  |  110<H>  |  30<H>  ----------------------------<  57<L>  4.5   |  28  |  3.95<H>    current egfr:18% -stage 4    01-19    144  |  106  |  30<H>  ----------------------------<  102<H>  2.9<LL>   |  32<H>  |  4.19<H>  01-18    144  |  104  |  28<H>  ----------------------------<  56<L>  2.6<LL>   |  34<H>  |  4.31<H>    Ca    7.5<L>      19 Jan 2018 09:40  Ca    7.3<L>      19 Jan 2018 01:43  Ca    7.8<L>      18 Jan 2018 20:08    TPro  6.1  /  Alb  2.8<L>  /  TBili  0.6  /  DBili  x   /  AST  19  /  ALT  20  /  AlkPhos  80  01-18          URINE STUDIES:  Potassium, Random Urine: 14 mmol/L (01-19 @ 05:13)  Sodium, Random Urine: 53 mmol/L (01-19 @ 05:13)  Creatinine, Random Urine: 105 mg/dL (01-19 @ 05:13)  Osmolality, Random Urine: 293 mos/kg (01-19 @ 02:38)    Total Protein, Random Urine (01.19.18 @ 05:13)    Total Protein, Random Urine: 266 mg/dL

## 2018-01-21 RX ADMIN — Medication 1 DROP(S): at 14:07

## 2018-01-21 RX ADMIN — Medication 30 MILLIGRAM(S): at 14:07

## 2018-01-21 RX ADMIN — Medication 50 MILLIGRAM(S): at 06:09

## 2018-01-21 RX ADMIN — Medication 1 DROP(S): at 20:50

## 2018-01-21 RX ADMIN — PREGABALIN 1000 MICROGRAM(S): 225 CAPSULE ORAL at 14:09

## 2018-01-21 RX ADMIN — Medication 81 MILLIGRAM(S): at 14:07

## 2018-01-21 RX ADMIN — SIMVASTATIN 40 MILLIGRAM(S): 20 TABLET, FILM COATED ORAL at 22:23

## 2018-01-21 RX ADMIN — Medication 1 DROP(S): at 20:59

## 2018-01-21 RX ADMIN — Medication 1000 UNIT(S): at 14:07

## 2018-01-21 RX ADMIN — Medication 50 MILLIGRAM(S): at 20:55

## 2018-01-21 NOTE — PROGRESS NOTE ADULT - SUBJECTIVE AND OBJECTIVE BOX

## 2018-01-21 NOTE — PATIENT PROFILE ADULT. - HEALTHCARE QUESTIONS, PROFILE
none "can't the 17% function that's left of my kidney prevent me from doing dialysis?". referred to medical team for more information of his kidney function status

## 2018-01-21 NOTE — PROGRESS NOTE ADULT - SUBJECTIVE AND OBJECTIVE BOX
Patient is a 57y old  Male who presents with a chief complaint of chest pain (18 Jan 2018 22:58)    PATIENT IS SEEN AND EXAMINED IN MEDICAL FLOOR.    ALLERGIES:  fish (Rash)  liver (Anaphylaxis)  No Known Drug Allergies      VITALS:    Vital Signs Last 24 Hrs  T(C): 37.2 (21 Jan 2018 16:01), Max: 37.2 (21 Jan 2018 07:45)  T(F): 99 (21 Jan 2018 16:01), Max: 99 (21 Jan 2018 07:45)  HR: 85 (21 Jan 2018 16:01) (76 - 89)  BP: 156/84 (21 Jan 2018 16:01) (138/83 - 156/84)  BP(mean): --  RR: 16 (21 Jan 2018 16:01) (16 - 17)  SpO2: 95% (21 Jan 2018 16:01) (94% - 98%)      .Blood Blood-Peripheral  12-11 @ 18:56   No growth at 5 days.  --  --          MEDICATIONS:    MEDICATIONS  (STANDING):  artificial  tears Solution 1 Drop(s) Both EYES every 6 hours  aspirin  chewable 81 milliGRAM(s) Oral daily  cholecalciferol 1000 Unit(s) Oral daily  cyanocobalamin 1000 MICROGram(s) Oral daily  dextrose 5%. 1000 milliLiter(s) (50 mL/Hr) IV Continuous <Continuous>  dextrose 50% Injectable 12.5 Gram(s) IV Push once  dextrose 50% Injectable 25 Gram(s) IV Push once  dextrose 50% Injectable 25 Gram(s) IV Push once  metoprolol     tartrate 50 milliGRAM(s) Oral two times a day  ofloxacin 0.3% Solution 1 Drop(s) Both EYES four times a day  oseltamivir 30 milliGRAM(s) Oral daily  prednisoLONE acetate 1% Suspension 1 Drop(s) Both EYES two times a day  simvastatin 40 milliGRAM(s) Oral at bedtime  sodium chloride 0.45%. 1000 milliLiter(s) (60 mL/Hr) IV Continuous <Continuous>      MEDICATIONS  (PRN):  acetaminophen   Tablet 650 milliGRAM(s) Oral every 6 hours PRN For Temp greater than 38 C (100.4 F)  dextrose Gel 1 Dose(s) Oral once PRN Blood Glucose LESS THAN 70 milliGRAM(s)/deciliter  glucagon  Injectable 1 milliGRAM(s) IntraMuscular once PRN Glucose LESS THAN 70 milligrams/deciliter  glucagon  Injectable 1 milliGRAM(s) IntraMuscular once PRN Glucose LESS THAN 70 milligrams/deciliter  guaiFENesin   Syrup  (Sugar-Free) 200 milliGRAM(s) Oral every 6 hours PRN Cough      REVIEW OF SYSTEMS:                           ALL ROS DONE [ X   ]    CONSTITUTIONAL:  LETHARGIC [   ], FEVER [   ], UNRESPONSIVE [   ]  CVS:  CP  [   ], SOB, [   ], PALPITATIONS [   ], DIZZYNESS [   ]  RS: COUGH [   ], SPUTUM [   ]  GI: ABDOMINAL PAIN [   ], NAUSEA [   ], VOMITINGS [   ], DIARRHEA [   ], CONSTIPATION [   ]  :  DYSURIA [   ], NOCTURIA [   ], INCREASED FREQUENCY [   ], DRIBLING [   ],  SKELETAL: PAINFUL JOINTS [   ], SWOLLEN JOINTS [   ], NECK ACHE [   ], LOW BACK ACHE [   ],  SKIN : ULCERS [   ], RASH [   ], ITCHING [   ]  CNS: HEAD ACHE [   ], DOUBLE VISION [   ], BLURRED VISION [   ], AMS / CONFUSION [   ], SEIZURES [   ], WEAKNESS [   ],TINGLING / NUMBNESS [   ]    PHYSICAL EXAMINATION:  GENERAL APPEARANCE: NO DISTRESS  HEENT:   PALLOR +,  NO  JVD,  NO   NODES, NECK SUPPLE  CVS: S1 +, S2 +,   RS: AEEB,  OCCASIONAL  RALES +,  B/L MILD RONCHI +  ABD: SOFT, NT, NO, BS +  EXT: NO PE, LEFT BKA   SKIN: WARM,   SKELETAL:  ROM ACCEPTABLE  CNS:  AAO X 3   ,NO   DEFICITS    RADIOLOGY :      ASSESSMENT :     Hypokalemia  Coronary artery disease  Glaucoma  Anemia  CKD (chronic kidney disease)  Adrenal insufficiency  Hypertension  No pertinent past medical history  Below knee amputation status, left      PLAN:  HPI:  Patient is a 57M from [facility] with PMH anemia (baseline Hb 8-9), CKD (baseline Cr 4), CHF (last echo Dec 2017 normal), OM s/p L BKA, CAD on aspirin, glaucoma, HTN, adrenal insufficiency on florinef, sent from NH for patient reporting chest pain. Patient was seen and examined upon admission, denies fever, chills, SOB, palpitations, chest pain, nausea, diarrhea or constipation but states that he was sent due to vomiting x 1 and continued cough, not chest pain. He was seen in [facility] in Dec 2017 for chest pain and cough, found to be + flu, finished course of tamiflu, and at that time, refused IV fluids for CKD and was advised to see outpatient nephro for AV fistula placement in preparation for dialysis. Was seen by cardio also at that time. In the ED, found to have K 2.6, given supplementation, EKG shows NSR, qtc 480, 1st deg av block 214msec. In no acute distress. Given glucagon and d50 x 1 due to FS 45, as per NH papers, has PRN glucagon order for FS <70 as patient has been having poor po intake and is on florinef, also has not eaten anything today. (18 Jan 2018 22:58)    - INFLUENZA B URTI, ACUTE BRONCHITIS ON TAMIFLU  (  day # 3 )   - CKD STAGE 4, ANEMIA OF CKD - VASCULAR TEAM TO CONSIDER PLACING AVF ( DR. JEFFERSON ) - pending still , RENAL F/UP IS IN PROGRESS.  - D/W PATIENT AND STAFF  - DR. MAC

## 2018-01-21 NOTE — CHART NOTE - NSCHARTNOTEFT_GEN_A_CORE
Patient is repeatedly refusing all labs, despite explaining importance and impact of results on treatment plan.  patient refused all morning labs  Thanks

## 2018-01-21 NOTE — PROGRESS NOTE ADULT - ASSESSMENT
A/P:  1.CKD:stage 4 ,secondary to dm/htn/smoking.pts renal function is around his baseline  -suggest to  gentle iv hydration as pt may have upper gi bleed  -Keep patient euvolemic and renal diet  -Avoid Nephrotoxic Meds/ Agents such as (NSAIDs, IV contrast, Aminoglycosides such as gentamicin, -Gadolinium contrast, Phosphate containing enemas, etc..)  -Adjust Medications according to eGFR  -f/u bmp daily  2.Hypokalemia:now k is nl  -we will check k now as pt is on iv k in iv fluid  -keep K >4 and <5  -f/u K level this pm  3.Anemia:MF  -may need procrit once bp is controlled  -f/u Hb daily  -w/u for gi bleed  4.Htn: BP is high   -we will titrate bp meds as ordered to keep sbp<130  -avoid Nahco3  5.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 A/P:  1.CKD:stage 4 ,secondary to dm/htn/smoking.pts renal function is around his baseline as per labs 2 days ago  -Keep patient euvolemic and renal diet  -Avoid Nephrotoxic Meds/ Agents such as (NSAIDs, IV contrast, Aminoglycosides such as gentamicin, -Gadolinium contrast, Phosphate containing enemas, etc..)  -Adjust Medications according to eGFR  -f/u bmp daily  2.Hypokalemia:now k is nl  -keep K >4 and <5  -f/u K level daily  3.Anemia:MF  -may need procrit once bp is controlled  -f/u Hb daily  4.Htn: BP is mildly high  -please  titrate bp meds as ordered to keep sbp<130  -avoid Nahco3  5.MBD:secondary to ckd  -f/u phos and pth level in am

## 2018-01-22 LAB
ANION GAP SERPL CALC-SCNC: 5 MMOL/L — SIGNIFICANT CHANGE UP (ref 5–17)
BUN SERPL-MCNC: 32 MG/DL — HIGH (ref 7–18)
CALCIUM SERPL-MCNC: 7.9 MG/DL — LOW (ref 8.4–10.5)
CALCIUM SERPL-MCNC: 8.4 MG/DL — SIGNIFICANT CHANGE UP (ref 8.4–10.5)
CHLORIDE SERPL-SCNC: 105 MMOL/L — SIGNIFICANT CHANGE UP (ref 96–108)
CO2 SERPL-SCNC: 31 MMOL/L — SIGNIFICANT CHANGE UP (ref 22–31)
CREAT SERPL-MCNC: 4.19 MG/DL — HIGH (ref 0.5–1.3)
GLUCOSE BLDC GLUCOMTR-MCNC: 142 MG/DL — HIGH (ref 70–99)
GLUCOSE BLDC GLUCOMTR-MCNC: 176 MG/DL — HIGH (ref 70–99)
GLUCOSE SERPL-MCNC: 156 MG/DL — HIGH (ref 70–99)
HCT VFR BLD CALC: 29.1 % — LOW (ref 39–50)
HGB BLD-MCNC: 9 G/DL — LOW (ref 13–17)
MAGNESIUM SERPL-MCNC: 2.2 MG/DL — SIGNIFICANT CHANGE UP (ref 1.6–2.6)
MCHC RBC-ENTMCNC: 28.9 PG — SIGNIFICANT CHANGE UP (ref 27–34)
MCHC RBC-ENTMCNC: 30.9 GM/DL — LOW (ref 32–36)
MCV RBC AUTO: 93.4 FL — SIGNIFICANT CHANGE UP (ref 80–100)
PHOSPHATE SERPL-MCNC: 3.7 MG/DL — SIGNIFICANT CHANGE UP (ref 2.5–4.5)
PLATELET # BLD AUTO: 88 K/UL — LOW (ref 150–400)
POTASSIUM SERPL-MCNC: 3.7 MMOL/L — SIGNIFICANT CHANGE UP (ref 3.5–5.3)
POTASSIUM SERPL-SCNC: 3.7 MMOL/L — SIGNIFICANT CHANGE UP (ref 3.5–5.3)
PTH-INTACT FLD-MCNC: 104 PG/ML — HIGH (ref 15–65)
RBC # BLD: 3.12 M/UL — LOW (ref 4.2–5.8)
RBC # FLD: 13.6 % — SIGNIFICANT CHANGE UP (ref 10.3–14.5)
SODIUM SERPL-SCNC: 141 MMOL/L — SIGNIFICANT CHANGE UP (ref 135–145)
WBC # BLD: 2.1 K/UL — LOW (ref 3.8–10.5)
WBC # FLD AUTO: 2.1 K/UL — LOW (ref 3.8–10.5)

## 2018-01-22 PROCEDURE — 99232 SBSQ HOSP IP/OBS MODERATE 35: CPT

## 2018-01-22 RX ADMIN — Medication 1 DROP(S): at 23:05

## 2018-01-22 RX ADMIN — Medication 1 DROP(S): at 08:21

## 2018-01-22 RX ADMIN — SIMVASTATIN 40 MILLIGRAM(S): 20 TABLET, FILM COATED ORAL at 23:05

## 2018-01-22 RX ADMIN — Medication 1 DROP(S): at 08:23

## 2018-01-22 RX ADMIN — Medication 50 MILLIGRAM(S): at 07:05

## 2018-01-22 NOTE — CONSULT NOTE ADULT - ATTENDING COMMENTS
Seen ex'ed dw'ed PA, agree w most above  AVF requested by primary svc for worsening CKD.  R hand dom  L UE: Palp rad pulse.  No edema, no PPM/AICD.  No vis periph veins.  L BKA C/D/I, R leg skin intact.    Recovering from flu  [place] emesis?    DW'ed pt: he will consider proceeeding w perm access on this admission.  If pt agrees and medically cleared will try to johann (poss OR on Wed)  Vein mapping please  No venipuncture L UE

## 2018-01-22 NOTE — ED ADULT NURSE REASSESSMENT NOTE - NS ED NURSE REASSESS COMMENT FT1
07 am - received pt from night nurse Colton without TELE box and IV - pt refused ,NP Spring  made aware , still on droplet isolation , stable ,med as ordered
Pt refuses IV and Tele monitor. MD Adan notified
axox3 ,nad endorsed to next nurse Kenneth Polanco
received pt  awake alert orienteds x 3 n ot in distress,on droplet precaution.waiting for med bed.
verbal report given to nurse jemima.
Patient remains alert, responsive, resting in stretcher. Patient vomited x 2 times, NP Dolores made aware. Patient endorsed to MICHI Garcia. Awaiting inpatient bed.
received pt from inna tubbs from holding,pt awake alert not in distress.hooked to cardiac monitor.
t awake alert not in acute distress. transferred to holding reort given to nurse janell
Patient received from RN Marcie, alert, responsive, resting in stretcher, moving all extremities. Patient on droplet precautions, verbalized understanding of droplet precautions. Awaiting inpatient bed.

## 2018-01-22 NOTE — PROGRESS NOTE ADULT - SUBJECTIVE AND OBJECTIVE BOX
Patient is a 57y old  Male who presents with a chief complaint of chest pain (18 Jan 2018 22:58)    PATIENT IS SEEN AND EXAMINED IN MEDICAL FLOOR.    ALLERGIES:  fish (Rash)  liver (Anaphylaxis)  No Known Drug Allergies    VITALS:    Vital Signs Last 24 Hrs  T(C): 36.8 (22 Jan 2018 07:35), Max: 37.1 (22 Jan 2018 05:32)  T(F): 98.3 (22 Jan 2018 07:35), Max: 98.7 (22 Jan 2018 05:32)  HR: 72 (22 Jan 2018 08:29) (72 - 77)  BP: 125/81 (22 Jan 2018 08:29) (125/81 - 183/100)  BP(mean): --  RR: 18 (22 Jan 2018 08:29) (16 - 18)  SpO2: 97% (22 Jan 2018 08:29) (95% - 99%)    LABS:  CBC Full  -  ( 22 Jan 2018 06:14 )  WBC Count : 2.1 K/uL  Hemoglobin : 9.0 g/dL  Hematocrit : 29.1 %  Platelet Count - Automated : 88 K/uL  Mean Cell Volume : 93.4 fl  Mean Cell Hemoglobin : 28.9 pg  Mean Cell Hemoglobin Concentration : 30.9 gm/dL  Auto Neutrophil # : x  Auto Lymphocyte # : x  Auto Monocyte # : x  Auto Eosinophil # : x  Auto Basophil # : x  Auto Neutrophil % : x  Auto Lymphocyte % : x  Auto Monocyte % : x  Auto Eosinophil % : x  Auto Basophil % : x      01-22    141  |  105  |  32<H>  ----------------------------<  156<H>  3.7   |  31  |  4.19<H>    Ca    7.9<L>      22 Jan 2018 06:14  Phos  3.7     01-22  Mg     2.2     01-22        .Blood Blood-Peripheral  12-11 @ 18:56   No growth at 5 days.  --  --          MEDICATIONS:    MEDICATIONS  (STANDING):  artificial  tears Solution 1 Drop(s) Both EYES every 6 hours  aspirin  chewable 81 milliGRAM(s) Oral daily  cholecalciferol 1000 Unit(s) Oral daily  cyanocobalamin 1000 MICROGram(s) Oral daily  dextrose 5%. 1000 milliLiter(s) (50 mL/Hr) IV Continuous <Continuous>  dextrose 50% Injectable 12.5 Gram(s) IV Push once  dextrose 50% Injectable 25 Gram(s) IV Push once  dextrose 50% Injectable 25 Gram(s) IV Push once  metoprolol     tartrate 50 milliGRAM(s) Oral two times a day  ofloxacin 0.3% Solution 1 Drop(s) Both EYES four times a day  oseltamivir 30 milliGRAM(s) Oral daily  prednisoLONE acetate 1% Suspension 1 Drop(s) Both EYES two times a day  simvastatin 40 milliGRAM(s) Oral at bedtime  sodium chloride 0.45%. 1000 milliLiter(s) (60 mL/Hr) IV Continuous <Continuous>      MEDICATIONS  (PRN):  acetaminophen   Tablet 650 milliGRAM(s) Oral every 6 hours PRN For Temp greater than 38 C (100.4 F)  dextrose Gel 1 Dose(s) Oral once PRN Blood Glucose LESS THAN 70 milliGRAM(s)/deciliter  glucagon  Injectable 1 milliGRAM(s) IntraMuscular once PRN Glucose LESS THAN 70 milligrams/deciliter  glucagon  Injectable 1 milliGRAM(s) IntraMuscular once PRN Glucose LESS THAN 70 milligrams/deciliter  guaiFENesin   Syrup  (Sugar-Free) 200 milliGRAM(s) Oral every 6 hours PRN Cough      REVIEW OF SYSTEMS:                           ALL ROS DONE [ X   ]    CONSTITUTIONAL:  LETHARGIC [   ], FEVER [   ], UNRESPONSIVE [   ]  CVS:  CP  [   ], SOB, [   ], PALPITATIONS [   ], DIZZYNESS [   ]  RS: COUGH [   ], SPUTUM [   ]  GI: ABDOMINAL PAIN [   ], NAUSEA [   ], VOMITINGS [   ], DIARRHEA [   ], CONSTIPATION [   ]  :  DYSURIA [   ], NOCTURIA [   ], INCREASED FREQUENCY [   ], DRIBLING [   ],  SKELETAL: PAINFUL JOINTS [   ], SWOLLEN JOINTS [   ], NECK ACHE [   ], LOW BACK ACHE [   ],  SKIN : ULCERS [   ], RASH [   ], ITCHING [   ]  CNS: HEAD ACHE [   ], DOUBLE VISION [   ], BLURRED VISION [   ], AMS / CONFUSION [   ], SEIZURES [   ], WEAKNESS [   ],TINGLING / NUMBNESS [   ]    PHYSICAL EXAMINATION:  GENERAL APPEARANCE: NO DISTRESS  HEENT:   PALLOR +,  NO  JVD,  NO   NODES, NECK SUPPLE  CVS: S1 +, S2 +,   RS: AEEB,  OCCASIONAL  RALES +,  B/L MILD RONCHI +  ABD: SOFT, NT, NO, BS +  EXT: NO PE, LEFT BKA   SKIN: WARM,   SKELETAL:  ROM ACCEPTABLE  CNS:  AAO X 3   ,NO   DEFICITS    RADIOLOGY :      ASSESSMENT :     Hypokalemia  Coronary artery disease  Glaucoma  Anemia  CKD (chronic kidney disease)  Adrenal insufficiency  Hypertension  Below knee amputation status, left      PLAN:  HPI:  Patient is a 57M from [facility] with PMH anemia (baseline Hb 8-9), CKD (baseline Cr 4), CHF (last echo Dec 2017 normal), OM s/p L BKA, CAD on aspirin, glaucoma, HTN, adrenal insufficiency on florinef, sent from NH for patient reporting chest pain. Patient was seen and examined upon admission, denies fever, chills, SOB, palpitations, chest pain, nausea, diarrhea or constipation but states that he was sent due to vomiting x 1 and continued cough, not chest pain. He was seen in [facility] in Dec 2017 for chest pain and cough, found to be + flu, finished course of tamiflu, and at that time, refused IV fluids for CKD and was advised to see outpatient nephro for AV fistula placement in preparation for dialysis. Was seen by cardio also at that time. In the ED, found to have K 2.6, given supplementation, EKG shows NSR, qtc 480, 1st deg av block 214msec. In no acute distress. Given glucagon and d50 x 1 due to FS 45, as per NH papers, has PRN glucagon order for FS <70 as patient has been having poor po intake and is on florinef, also has not eaten anything today. (18 Jan 2018 22:58)    - D/W VASCULAR MD. PATIENT IS SCHEDULED FOR AVF ON WEDNESDAY.  PATIENT IS MODERATE SURGICAL RISK.   - INFLUENZA B URTI, ACUTE BRONCHITIS ON TAMIFLU  (  day # 4 )   - CKD STAGE 4, ANEMIA OF CKD - VASCULAR TEAM TO CONSIDER PLACING AVF ( DR. JEFFERSON ) -  RENAL F/UP IS IN PROGRESS.  - D/W PATIENT AND STAFF  - DR. MAC

## 2018-01-22 NOTE — CONSULT NOTE ADULT - SUBJECTIVE AND OBJECTIVE BOX
Patient is a 57y Male whom presented to the hospital with chest pain , being treated for flu, noted to have hypokalemia and abnormal renal function.    Medical HPI:  Patient is a 57M from [facility] with PMH anemia (baseline Hb 8-9), CKD (baseline Cr 4), CHF (last echo Dec 2017 normal), OM s/p L BKA, CAD on aspirin, glaucoma, HTN, adrenal insufficiency on florinef, sent from NH for patient reporting chest pain. Patient was seen and examined upon admission, denies fever, chills, SOB, palpitations, chest pain, nausea, diarrhea or constipation but states that he was sent due to vomiting x 1 and continued cough, not chest pain. [facility] for chest pain and cough, found to be + flu, finished course of tamiflu, and at that time, refused IV fluids for CKD and was advised to see outpatient nephro for AV fistula placement in preparation for dialysis. Was seen by cardio also at that time. In the ED, found to have K 2.6, given supplementation, EKG shows NSR, qtc 480, 1st deg av block 214msec. In no acute distress. Given glucagon and d50 x 1 due to FS 45, as per NH papers, has PRN glucagon order for FS <70 as patient has been having poor po intake and is on florinef, also has not eaten anything today. (2018 22:58)  Renal HPI:  pts current chart reviewed and case discussed with resident  pt is known to have ckd-stage 4 as per old labs  pt denies pmh of  renal stones, recurrent uti or nephrotic edema or nephrotic syndrome  pt denies pmh of retinopathy or neuropathy  pt denies Nsaids use or otc other nephrotoxic supplements recently  pt currently denies cp,sob ,cough ,skin rash ,leg edema or gu symptons  pt had vomited x 2 this pm ,coffe color vomitus noted   pt denies melena or rectal bleed/epigastric pain    PAST MEDICAL & SURGICAL HISTORY:  Coronary artery disease  Glaucoma  Anemia  CKD (chronic kidney disease)  Adrenal insufficiency  Hypertension  Below knee amputation status, left      Home Medications: Reviewed    MEDICATIONS  (STANDING):  aspirin  chewable 81 milliGRAM(s) Oral daily  cholecalciferol 1000 Unit(s) Oral daily  cyanocobalamin 1000 MICROGram(s) Oral daily  dextrose 5% + sodium chloride 0.9% with potassium chloride 20 mEq/L 1000 milliLiter(s) (60 mL/Hr) IV Continuous <Continuous>  dextrose 5%. 1000 milliLiter(s) (50 mL/Hr) IV Continuous <Continuous>  dextrose 50% Injectable 12.5 Gram(s) IV Push once  dextrose 50% Injectable 25 Gram(s) IV Push once  dextrose 50% Injectable 25 Gram(s) IV Push once  ofloxacin 0.3% Solution 1 Drop(s) Both EYES four times a day  oseltamivir 30 milliGRAM(s) Oral daily  petrolatum Ophthalmic Ointment 1 Application(s) Both EYES four times a day  prednisoLONE acetate 1% Suspension 1 Drop(s) Both EYES two times a day  simvastatin 40 milliGRAM(s) Oral at bedtime    MEDICATIONS  (PRN):  acetaminophen   Tablet 650 milliGRAM(s) Oral every 6 hours PRN For Temp greater than 38 C (100.4 F)  dextrose Gel 1 Dose(s) Oral once PRN Blood Glucose LESS THAN 70 milliGRAM(s)/deciliter  glucagon  Injectable 1 milliGRAM(s) IntraMuscular once PRN Glucose LESS THAN 70 milligrams/deciliter  glucagon  Injectable 1 milliGRAM(s) IntraMuscular once PRN Glucose LESS THAN 70 milligrams/deciliter  guaiFENesin   Syrup  (Sugar-Free) 200 milliGRAM(s) Oral every 6 hours PRN Cough      Allergies    fish (Rash)  liver (Anaphylaxis)  No Known Drug Allergies    Intolerances        SOCIAL HISTORY:  Alcohol use [X ] No  [ ] Yes  Smoking  [ ] No  [x ] Yes x many yrs  Drug Abuse [X ] No  [ ] Yes  Tattoo [X ] No  [ ] Yes  History of Blood transfusion [ X] No  [ ] Yes    FAMILY HISTORY:  No pertinent family history in first degree relatives    REVIEW OF SYSTEMS:  CONSTITUTIONAL: + weakness,no fevers or chills  EYES/ENT: No visual changes;  No vertigo or throat pain   NECK: No pain or stiffness  RESPIRATORY: No cough, wheezing, hemoptysis; No shortness of breath  CARDIOVASCULAR: No chest pain or palpitations  GASTROINTESTINAL: No abdominal or epigastric pain. No diarrhea or constipation. No melena or hematochezia.  pt has coffe color vomiting x 1 this pm  GENITOURINARY: No dysuria, frequency or hematuria  NEUROLOGICAL: No numbness or weakness  SKIN: No itching, burning, rashes, or lesions   All other review of systems is negative unless indicated above    Vital Signs  T(F): 100.1 (18 @ 15:43), Max: 100.3 (18 @ 12:12)  HR: 113 (18 @ 15:43) (91 - 115)  BP: 187/90 (18 @ 15:43) (159/87 - 189/92)  ABP: --  RR: 20 (18 @ 15:43) (20 - 20)  SpO2: 97% (18 @ 15:43) (96% - 100%)  Wt(kg): --  CVP(cm H2O): --  CO: --  PCWP: --    I and O's:    Daily     Daily     PHYSICAL EXAM:  Constitutional: well developed, Mal- nourished  and in nad  HEENT: PERRLA,  no icteric sclera and mild pallor of conjunctiva noted  Neck: No JVD, thyromegaly or adenopathy  Respiratory: reduced air entry lower lungs with no rales, wheezing or rhonchi  Cardiovascular: S1 and S2 normally heard  Gastrointestinal: soft, nondistended, nontender and normal bowel sounds heard  Extremities: No peripheral edema or cyanosis  Lt.BKA noted  no edema in RLE   Neurological: A/O x 3, no focal deficits  Psychiatric: Normal mood, normal affect  : No flank tenderness  Skin: No rashes        LABS:                        8.5    3.8   )-----------( 120      ( 2018 09:40 )             27.3           144  |  110<H>  |  30<H>  ----------------------------<  57<L>  4.5   |  28  |  3.95<H>      144  |  106  |  30<H>  ----------------------------<  102<H>  2.9<LL>   |  32<H>  |  4.19<H>      144  |  104  |  28<H>  ----------------------------<  56<L>  2.6<LL>   |  34<H>  |  4.31<H>    Ca    7.5<L>      2018 09:40  Ca    7.3<L>      2018 01:43  Ca    7.8<L>      2018 20:08    TPro  6.1  /  Alb  2.8<L>  /  TBili  0.6  /  DBili  x   /  AST  19  /  ALT  20  /  AlkPhos  80        URINE STUDIES:  Urinalysis Basic - ( 2018 02:38 )    Color: Yellow / Appearance: Clear / S.015 / pH: x  Gluc: x / Ketone: Negative  / Bili: Negative / Urobili: Negative   Blood: x / Protein: 500 mg/dL / Nitrite: Negative   Leuk Esterase: Negative / RBC: 2-5 /HPF / WBC 0-2 /HPF   Sq Epi: x / Non Sq Epi: Few /HPF / Bacteria: Few /HPF    Total Protein, Random Urine (18 @ 05:13)    Total Protein, Random Urine: 266 mg/dL  Potassium, Random Urine: 14 mmol/L (18 @ 05:13)  Sodium, Random Urine: 53 mmol/L (18 @ 05:13)  Creatinine, Random Urine: 105 mg/dL (18 @ 05:13)  Osmolality, Random Urine: 293 mos/kg (18 @ 02:38)                RADIOLOGY & ADDITIONAL STUDIES:    < from: Xray Chest 2 Views PA/Lat (18 @ 19:55) >  EXAM:  XR CHEST PA LAT 2V                            PROCEDURE DATE:  2018          INTERPRETATION:  PA and lateral chest x-rays     Clinical Indication: Chest Pain.    Comparison: 2017    PA and lateral chest x-rays are submitted for evaluation. There is no   acute pulmonary infiltrate, pleural effusion, or pneumothorax. The   cardiac silhouette is within normal limits. The trachea is midline. There   is no pulmonary vascular congestion .     Impression: No radiographic evidence for acute cardiopulmonary disease.      < end of copied text >
Attending:  Dr Chaudhry     HPI:  58 y/o Male from [facility] w/ PMHx Anemia (baseline Hb 8-9), CKD (baseline Cr 4), CHF (last echo Dec 2017 normal), OM s/p L BKA, CAD on aspirin, glaucoma, HTN, adrenal insufficiency on florinef, p/w c/o chest pain, nausea and vomiting. Vascular surgery called to evaluate pt for permanent access for HD. Pt seen and examined at bedside. Offers no acute complaints at this time. States he would like to go home. Pt is aware of declining function of his kidneys and need for HD. Pt is right hand dominant. Pt currently RVP positive, on Tamiflu. Pt recently at [facility] (Dec 2017) for chest pain and cough, found to be + flu, finished course of Tamiflu and at that time, refused IV fluids for CKD and was advised to see outpatient nephrologist for AVF creation.        PAST MEDICAL & SURGICAL HISTORY:  Coronary artery disease  Glaucoma  Anemia  CKD (chronic kidney disease)  Adrenal insufficiency  Hypertension  Below knee amputation status, left      MEDICATIONS  (STANDING):  artificial  tears Solution 1 Drop(s) Both EYES every 6 hours  aspirin  chewable 81 milliGRAM(s) Oral daily  cholecalciferol 1000 Unit(s) Oral daily  cyanocobalamin 1000 MICROGram(s) Oral daily  dextrose 5%. 1000 milliLiter(s) (50 mL/Hr) IV Continuous <Continuous>  dextrose 50% Injectable 12.5 Gram(s) IV Push once  dextrose 50% Injectable 25 Gram(s) IV Push once  dextrose 50% Injectable 25 Gram(s) IV Push once  metoprolol     tartrate 50 milliGRAM(s) Oral two times a day  ofloxacin 0.3% Solution 1 Drop(s) Both EYES four times a day  oseltamivir 30 milliGRAM(s) Oral daily  prednisoLONE acetate 1% Suspension 1 Drop(s) Both EYES two times a day  simvastatin 40 milliGRAM(s) Oral at bedtime  sodium chloride 0.45%. 1000 milliLiter(s) (60 mL/Hr) IV Continuous <Continuous>    MEDICATIONS  (PRN):  acetaminophen   Tablet 650 milliGRAM(s) Oral every 6 hours PRN For Temp greater than 38 C (100.4 F)  dextrose Gel 1 Dose(s) Oral once PRN Blood Glucose LESS THAN 70 milliGRAM(s)/deciliter  glucagon  Injectable 1 milliGRAM(s) IntraMuscular once PRN Glucose LESS THAN 70 milligrams/deciliter  glucagon  Injectable 1 milliGRAM(s) IntraMuscular once PRN Glucose LESS THAN 70 milligrams/deciliter  guaiFENesin   Syrup  (Sugar-Free) 200 milliGRAM(s) Oral every 6 hours PRN Cough      Allergies    fish (Rash)  liver (Anaphylaxis)  No Known Drug Allergies    Intolerances    SOCIAL HISTORY:  FAMILY HISTORY:  No pertinent family history in first degree relatives      Vital Signs Last 24 Hrs  T(C): 36.8 (22 Jan 2018 07:35), Max: 37.2 (21 Jan 2018 16:01)  T(F): 98.3 (22 Jan 2018 07:35), Max: 99 (21 Jan 2018 16:01)  HR: 72 (22 Jan 2018 08:29) (72 - 85)  BP: 125/81 (22 Jan 2018 08:29) (125/81 - 183/100)  BP(mean): --  RR: 18 (22 Jan 2018 08:29) (16 - 18)  SpO2: 97% (22 Jan 2018 08:29) (95% - 99%)    I&O's Summary      LABS:                        9.0    2.1   )-----------( 88       ( 22 Jan 2018 06:14 )             29.1     01-22    141  |  105  |  32<H>  ----------------------------<  156<H>  3.7   |  31  |  4.19<H>    Ca    7.9<L>      22 Jan 2018 06:14  Phos  3.7     01-22  Mg     2.2     01-22    Urinalysis (01.19.18 @ 02:38)    pH Urine: 6.0    Blood, Urine: Moderate    Glucose Qualitative, Urine: 50 mg/dL    Color: Yellow    Urine Appearance: Clear    Bilirubin: Negative    Ketone - Urine: Negative    Specific Gravity: 1.015    Protein, Urine: 500 mg/dL    Urobilinogen: Negative    Nitrite: Negative    Leukocyte Esterase Concentration: Negative    Influenza AH3 (RapRVP): Detected (12.11.17 @ 17:46)    Rapid Respiratory Viral Panel (01.19.18 @ 00:42)    Rapid RVP Result: Detected: The [employer] RVP Rapid uses polymerase chain reaction (PCR) and melt  curve analysis to screen for adenovirus; coronavirus HKU1, NL63, 229E,  OC43; human metapneumovirus (hMPV); human enterovirus/rhinovirus  (Entero/RV); influenza A; influenza A/H1;influenza A/H3; influenza  A/H1-2009; influenza B; parainfluenza viruses 1, 2, 3, 4; respiratory  syncytial virus; Bordetella pertussis; Mycoplasma pneumoniae; and  Chlamydophila pneumoniae.    Influenza B (RapRVP): Detected: TYPE:(C=Critical, N=Notification, A=Abnormal) c  TESTS: _infl b  DATE/TIME CALLED: _01/19/18 03:13  CALLED TO: Jericho HARP  READ BACK (2 Patient Identifiers)(Y/N): y_  READ BACK VALUES (Y/N): _y  CALLED BY: _jarod

## 2018-01-22 NOTE — CONSULT NOTE ADULT - ASSESSMENT
56 y/o Male w/ ESRD in need for permanent HD access, now RVP positive, ? GI bleed       -Plan for left AVF 1/24/2018  -Medical optimization/ clearance needed for OR, please note in chart  -Lt UE precautions   -F/u vein mapping   -Care as per medical team   -D/w Dr Chaudhry and agrees
A/P:  1.CKD:stage 4 ,secondary to dm/htn/smoking.pts renal function is around his baseline  -suggest to add gentle iv hydration as pt may have upper gi bleed  -Keep patient euvolemic and renal diet  -Avoid Nephrotoxic Meds/ Agents such as (NSAIDs, IV contrast, Aminoglycosides such as gentamicin, -Gadolinium contrast, Phosphate containing enemas, etc..)  -Adjust Medications according to eGFR  -f/u bmp daily  2.Hypokalemia:multifactorial like Florinef plus vomiting plus metabolic alkalosis induced  -suggest to replace kcl prn and d/c po nahco3  -Rx nausea/vomiting prn  -avoid Florinef at this time  -keep K >4 and <5  -f/u K level daily  3.Anemia:MF  -may need procrit once bp is controlled  -f/u Hb daily  -w/u for gi bleed  4.Htn: BP is high this pm secondary to vomiting  -suggest to moniter bp closley and add bp meds to keep sbp <130 if bp remains high when pt is not vomiting  -avoid Nahco3  5.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

## 2018-01-22 NOTE — PROGRESS NOTE ADULT - SUBJECTIVE AND OBJECTIVE BOX
SUBJECTIVE:       REVIEW OF SYSTEMS:  CONSTITUTIONAL: No weakness, fevers or chills  EYES/ENT: No visual changes;  No vertigo or throat pain   NECK: No pain or stiffness  RESPIRATORY: No cough, wheezing, hemoptysis; No shortness of breath  CARDIOVASCULAR: No chest pain or palpitations  GASTROINTESTINAL: No abdominal or epigastric pain. No nausea, vomiting, or hematemesis; No diarrhea or constipation. No melena or hematochezia.  GENITOURINARY: No dysuria, frequency , hematuria, flank pain or nocturia  NEUROLOGICAL: No numbness or weakness  SKIN: No itching, burning, rashes, or lesions   All other review of systems is negative unless indicated above    Current meds: MEDICATIONS  (STANDING):  artificial  tears Solution 1 Drop(s) Both EYES every 6 hours  aspirin  chewable 81 milliGRAM(s) Oral daily  cholecalciferol 1000 Unit(s) Oral daily  cyanocobalamin 1000 MICROGram(s) Oral daily  dextrose 5%. 1000 milliLiter(s) (50 mL/Hr) IV Continuous <Continuous>  dextrose 50% Injectable 12.5 Gram(s) IV Push once  dextrose 50% Injectable 25 Gram(s) IV Push once  dextrose 50% Injectable 25 Gram(s) IV Push once  metoprolol     tartrate 50 milliGRAM(s) Oral two times a day  ofloxacin 0.3% Solution 1 Drop(s) Both EYES four times a day  oseltamivir 30 milliGRAM(s) Oral daily  prednisoLONE acetate 1% Suspension 1 Drop(s) Both EYES two times a day  simvastatin 40 milliGRAM(s) Oral at bedtime  sodium chloride 0.45%. 1000 milliLiter(s) (60 mL/Hr) IV Continuous <Continuous>    MEDICATIONS  (PRN):  acetaminophen   Tablet 650 milliGRAM(s) Oral every 6 hours PRN For Temp greater than 38 C (100.4 F)  dextrose Gel 1 Dose(s) Oral once PRN Blood Glucose LESS THAN 70 milliGRAM(s)/deciliter  glucagon  Injectable 1 milliGRAM(s) IntraMuscular once PRN Glucose LESS THAN 70 milligrams/deciliter  glucagon  Injectable 1 milliGRAM(s) IntraMuscular once PRN Glucose LESS THAN 70 milligrams/deciliter  guaiFENesin   Syrup  (Sugar-Free) 200 milliGRAM(s) Oral every 6 hours PRN Cough      Vital Signs:   ICU Vital Signs Last 24 Hrs  T(C): 36.8 (22 Jan 2018 07:35), Max: 37.2 (21 Jan 2018 16:01)  T(F): 98.3 (22 Jan 2018 07:35), Max: 99 (21 Jan 2018 16:01)  HR: 72 (22 Jan 2018 08:29) (72 - 85)  BP: 125/81 (22 Jan 2018 08:29) (125/81 - 183/100)  RR: 18 (22 Jan 2018 08:29) (16 - 18)  SpO2: 97% (22 Jan 2018 08:29) (95% - 99%)      PHYSICAL EXAM:  Constitutional: well developed, well nourished  and in nad  HEENT: PERRL,  no icteric sclera and mild pallor of conjunctiva noted  Neck: No JVD, thyromegaly or adenopathy  Respiratory: reduced air entry lower lungs with no rales, wheezing or rhonchi  Cardiovascular: S1 and S2 normally heard  Gastrointestinal: soft, nondistended, nontender and normal bowel sounds heard  Extremities: No peripheral edema or cyanosis  Neurological: A/O x 3, no focal deficits  : No flank or cva tenderness palpated.  Skin: No rashes      LABS: no new labs today     CBC:                                9.0    2.1   )-----------( 88       ( 22 Jan 2018 06:14 )             29.1   01-22    141  |  105  |  32<H>  ----------------------------<  156<H>  3.7   |  31  |  4.19<H>    Ca    7.9<L>      22 Jan 2018 06:14  Phos  3.7     01-22  Mg     2.2     01-22 SUBJECTIVE: Patient seen comfortable, sitting on the bed. Patient reports he still has diarrhea but has no chest pain, abdominal pain, nausea, vomiting or urinary complaints.       REVIEW OF SYSTEMS:  CONSTITUTIONAL: No weakness, fevers or chills  EYES/ENT: patient is loosing vision;  No vertigo or throat pain   NECK: No pain or stiffness  RESPIRATORY: No cough, wheezing, hemoptysis; No shortness of breath  CARDIOVASCULAR: No chest pain or palpitations  GASTROINTESTINAL: Diarrhea(6-7 BMs watery). No abdominal or epigastric pain. No nausea, vomiting, or hematemesis; No constipation. No melena or hematochezia.  GENITOURINARY: No dysuria, frequency , hematuria, flank pain or nocturia  NEUROLOGICAL: No numbness or weakness  SKIN: No itching, burning, rashes, or lesions   All other review of systems is negative unless indicated above    MEDICATIONS  (STANDING):  artificial  tears Solution 1 Drop(s) Both EYES every 6 hours  aspirin  chewable 81 milliGRAM(s) Oral daily  cholecalciferol 1000 Unit(s) Oral daily  cyanocobalamin 1000 MICROGram(s) Oral daily  dextrose 5%. 1000 milliLiter(s) (50 mL/Hr) IV Continuous <Continuous>  dextrose 50% Injectable 12.5 Gram(s) IV Push once  dextrose 50% Injectable 25 Gram(s) IV Push once  dextrose 50% Injectable 25 Gram(s) IV Push once  metoprolol     tartrate 50 milliGRAM(s) Oral two times a day  ofloxacin 0.3% Solution 1 Drop(s) Both EYES four times a day  oseltamivir 30 milliGRAM(s) Oral daily  prednisoLONE acetate 1% Suspension 1 Drop(s) Both EYES two times a day  simvastatin 40 milliGRAM(s) Oral at bedtime  sodium chloride 0.45%. 1000 milliLiter(s) (60 mL/Hr) IV Continuous <Continuous>    MEDICATIONS  (PRN):  acetaminophen   Tablet 650 milliGRAM(s) Oral every 6 hours PRN For Temp greater than 38 C (100.4 F)  dextrose Gel 1 Dose(s) Oral once PRN Blood Glucose LESS THAN 70 milliGRAM(s)/deciliter  glucagon  Injectable 1 milliGRAM(s) IntraMuscular once PRN Glucose LESS THAN 70 milligrams/deciliter  glucagon  Injectable 1 milliGRAM(s) IntraMuscular once PRN Glucose LESS THAN 70 milligrams/deciliter  guaiFENesin   Syrup  (Sugar-Free) 200 milliGRAM(s) Oral every 6 hours PRN Cough      Vital Signs:   T(C): 36.8 (22 Jan 2018 07:35), Max: 37.2 (21 Jan 2018 16:01)  T(F): 98.3 (22 Jan 2018 07:35), Max: 99 (21 Jan 2018 16:01)  HR: 72 (22 Jan 2018 08:29) (72 - 85)  BP: 125/81 (22 Jan 2018 08:29) (125/81 - 183/100)  BP(mean): --  RR: 18 (22 Jan 2018 08:29) (16 - 18)  SpO2: 97% (22 Jan 2018 08:29) (95% - 99%)      PHYSICAL EXAM:  Constitutional: well developed, well nourished  and in nad  HEENT: Patient has strabismus and reports decreasing vision. PERRL,  no icteric sclera and mild pallor of conjunctiva noted  Neck: No JVD, thyromegaly or adenopathy  Respiratory: reduced air entry lower lungs with no rales, wheezing or rhonchi  Cardiovascular: S1 and S2 normally heard  Gastrointestinal: soft, nondistended, nontender and normal bowel sounds heard  Extremities: No peripheral edema or cyanosis  Neurological: A/O x 3, no focal deficits  : No flank or cva tenderness palpated.  Skin: No rashes      LABS:                         9.0    2.1   )-----------( 88       ( 22 Jan 2018 06:14 )             29.1   01-22    141  |  105  |  32<H>  ----------------------------<  156<H>  3.7   |  31  |  4.19<H>    Ca    7.9<L>      22 Jan 2018 06:14  Phos  3.7     01-22  Mg     2.2     01-22 SUBJECTIVE: Patient seen comfortable, sitting on the bed. Patient reports he still has diarrhea but has no chest pain, abdominal pain, nausea, vomiting or urinary complaints.       REVIEW OF SYSTEMS:  CONSTITUTIONAL: No weakness, fevers or chills  EYES/ENT: patient is loosing vision;  No vertigo or throat pain   NECK: No pain or stiffness  RESPIRATORY: No cough, wheezing, hemoptysis; No shortness of breath  CARDIOVASCULAR: No chest pain or palpitations  GASTROINTESTINAL: Diarrhea(6-7 BMs watery). No abdominal or epigastric pain. No nausea, vomiting, or hematemesis; No constipation. No melena or hematochezia.  GENITOURINARY: No dysuria, frequency , hematuria, flank pain or nocturia  NEUROLOGICAL: No numbness or weakness  SKIN: No itching, burning, rashes, or lesions   All other review of systems is negative unless indicated above    MEDICATIONS  (STANDING):  artificial  tears Solution 1 Drop(s) Both EYES every 6 hours  aspirin  chewable 81 milliGRAM(s) Oral daily  cholecalciferol 1000 Unit(s) Oral daily  cyanocobalamin 1000 MICROGram(s) Oral daily  dextrose 5%. 1000 milliLiter(s) (50 mL/Hr) IV Continuous <Continuous>  dextrose 50% Injectable 12.5 Gram(s) IV Push once  dextrose 50% Injectable 25 Gram(s) IV Push once  dextrose 50% Injectable 25 Gram(s) IV Push once  metoprolol     tartrate 50 milliGRAM(s) Oral two times a day  ofloxacin 0.3% Solution 1 Drop(s) Both EYES four times a day  oseltamivir 30 milliGRAM(s) Oral daily  prednisoLONE acetate 1% Suspension 1 Drop(s) Both EYES two times a day  simvastatin 40 milliGRAM(s) Oral at bedtime  sodium chloride 0.45%. 1000 milliLiter(s) (60 mL/Hr) IV Continuous <Continuous>    MEDICATIONS  (PRN):  acetaminophen   Tablet 650 milliGRAM(s) Oral every 6 hours PRN For Temp greater than 38 C (100.4 F)  dextrose Gel 1 Dose(s) Oral once PRN Blood Glucose LESS THAN 70 milliGRAM(s)/deciliter  glucagon  Injectable 1 milliGRAM(s) IntraMuscular once PRN Glucose LESS THAN 70 milligrams/deciliter  glucagon  Injectable 1 milliGRAM(s) IntraMuscular once PRN Glucose LESS THAN 70 milligrams/deciliter  guaiFENesin   Syrup  (Sugar-Free) 200 milliGRAM(s) Oral every 6 hours PRN Cough      Vital Signs:   T(C): 36.8 (22 Jan 2018 07:35), Max: 37.2 (21 Jan 2018 16:01)  T(F): 98.3 (22 Jan 2018 07:35), Max: 99 (21 Jan 2018 16:01)  HR: 72 (22 Jan 2018 08:29) (72 - 85)  BP: 125/81 (22 Jan 2018 08:29) (125/81 - 183/100)  BP(mean): --  RR: 18 (22 Jan 2018 08:29) (16 - 18)  SpO2: 97% (22 Jan 2018 08:29) (95% - 99%)      PHYSICAL EXAM:  Constitutional: well developed, well nourished  and in nad  HEENT: Patient has strabismus and reports decreasing vision. PERRL,  no icteric sclera and mild pallor of conjunctiva noted  Neck: No JVD, thyromegaly or adenopathy  Respiratory: reduced air entry lower lungs with no rales, wheezing or rhonchi  Cardiovascular: S1 and S2 normally heard  Gastrointestinal: soft, nondistended, nontender and normal bowel sounds heard  Extremities: No peripheral edema or cyanosis  Neurological: A/O x 3, no focal deficits  : No flank or cva tenderness palpated.  Skin: No rashes      LABS:                         9.0    2.1   )-----------( 88       ( 22 Jan 2018 06:14 )             29.1   01-22    141  |  105  |  32<H>  ----------------------------<  156<H>  3.7   |  31  |  4.19<H>    Ca    7.9<L>      22 Jan 2018 06:14  Phos  3.7     01-22  Mg     2.2     01-22    Parathyroid Hormone Intact, Serum (01.22.18 @ 10:29)    Calcium.: 8.4 mg/dL    Intact PTH: 104: PTH METHOD: Roche  Guide for Interpretation of PTH and Calcium Results                           Calcium             PTH                           MG/DL               PG/ML  Normal                   8.4-10.5            15-65  Primary  Hyperparathyroidism      >10.5               >50  Non-PTH Hypercalcemia    >10.5               0-20  Hypoparathroidism        <8.4                0-20  Pseudohypoparathyroid    <8.4                >50  This is intended as a guide only. Factors such as sunlight exposure,  Vitamin D status and renal function should be evaluated along with  clinical presentation. pg/mL    Basic Metabolic Panel - STAT (01.19.18 @ 09:40)    Sodium, Serum: 144 mmol/L    Potassium, Serum: 4.5: Specimen slightly hemolyzed mmol/L    Chloride, Serum: 110 mmol/L    Carbon Dioxide, Serum: 28 mmol/L    Anion Gap, Serum: 6 mmol/L    Blood Urea Nitrogen, Serum: 30 mg/dL    Creatinine, Serum: 3.95 mg/dL    Glucose, Serum: 57 mg/dL    Calcium, Total Serum: 7.5 mg/dL    eGFR if Non : 16: Interpretative comment    eGFR if : 18 mL/min/1.73M2

## 2018-01-23 LAB
ANION GAP SERPL CALC-SCNC: 9 MMOL/L — SIGNIFICANT CHANGE UP (ref 5–17)
BUN SERPL-MCNC: 39 MG/DL — HIGH (ref 7–18)
CALCIUM SERPL-MCNC: 8.3 MG/DL — LOW (ref 8.4–10.5)
CHLORIDE SERPL-SCNC: 106 MMOL/L — SIGNIFICANT CHANGE UP (ref 96–108)
CO2 SERPL-SCNC: 26 MMOL/L — SIGNIFICANT CHANGE UP (ref 22–31)
CREAT SERPL-MCNC: 4.62 MG/DL — HIGH (ref 0.5–1.3)
GLUCOSE BLDC GLUCOMTR-MCNC: 152 MG/DL — HIGH (ref 70–99)
GLUCOSE BLDC GLUCOMTR-MCNC: 212 MG/DL — HIGH (ref 70–99)
GLUCOSE SERPL-MCNC: 161 MG/DL — HIGH (ref 70–99)
HCT VFR BLD CALC: 31.2 % — LOW (ref 39–50)
HGB BLD-MCNC: 9.9 G/DL — LOW (ref 13–17)
MCHC RBC-ENTMCNC: 30.4 PG — SIGNIFICANT CHANGE UP (ref 27–34)
MCHC RBC-ENTMCNC: 31.7 GM/DL — LOW (ref 32–36)
MCV RBC AUTO: 95.8 FL — SIGNIFICANT CHANGE UP (ref 80–100)
PLATELET # BLD AUTO: 103 K/UL — LOW (ref 150–400)
POTASSIUM SERPL-MCNC: 4.4 MMOL/L — SIGNIFICANT CHANGE UP (ref 3.5–5.3)
POTASSIUM SERPL-SCNC: 4.4 MMOL/L — SIGNIFICANT CHANGE UP (ref 3.5–5.3)
RBC # BLD: 3.26 M/UL — LOW (ref 4.2–5.8)
RBC # FLD: 14 % — SIGNIFICANT CHANGE UP (ref 10.3–14.5)
SODIUM SERPL-SCNC: 141 MMOL/L — SIGNIFICANT CHANGE UP (ref 135–145)
WBC # BLD: 2.6 K/UL — LOW (ref 3.8–10.5)
WBC # FLD AUTO: 2.6 K/UL — LOW (ref 3.8–10.5)

## 2018-01-23 RX ORDER — FLUDROCORTISONE ACETATE 0.1 MG/1
0.1 TABLET ORAL
Qty: 0 | Refills: 0 | Status: DISCONTINUED | OUTPATIENT
Start: 2018-01-23 | End: 2018-01-25

## 2018-01-23 RX ORDER — HEPARIN SODIUM 5000 [USP'U]/ML
5000 INJECTION INTRAVENOUS; SUBCUTANEOUS EVERY 12 HOURS
Qty: 0 | Refills: 0 | Status: DISCONTINUED | OUTPATIENT
Start: 2018-01-23 | End: 2018-01-25

## 2018-01-23 RX ORDER — AMLODIPINE BESYLATE 2.5 MG/1
5 TABLET ORAL DAILY
Qty: 0 | Refills: 0 | Status: DISCONTINUED | OUTPATIENT
Start: 2018-01-23 | End: 2018-01-25

## 2018-01-23 RX ADMIN — Medication 81 MILLIGRAM(S): at 12:13

## 2018-01-23 RX ADMIN — Medication 1 DROP(S): at 22:38

## 2018-01-23 RX ADMIN — Medication 30 MILLIGRAM(S): at 12:13

## 2018-01-23 RX ADMIN — HEPARIN SODIUM 5000 UNIT(S): 5000 INJECTION INTRAVENOUS; SUBCUTANEOUS at 18:40

## 2018-01-23 RX ADMIN — SIMVASTATIN 40 MILLIGRAM(S): 20 TABLET, FILM COATED ORAL at 22:38

## 2018-01-23 RX ADMIN — Medication 1 DROP(S): at 18:40

## 2018-01-23 RX ADMIN — Medication 1000 UNIT(S): at 12:13

## 2018-01-23 RX ADMIN — Medication 50 MILLIGRAM(S): at 06:36

## 2018-01-23 RX ADMIN — PREGABALIN 1000 MICROGRAM(S): 225 CAPSULE ORAL at 12:13

## 2018-01-23 RX ADMIN — AMLODIPINE BESYLATE 5 MILLIGRAM(S): 2.5 TABLET ORAL at 12:13

## 2018-01-23 RX ADMIN — Medication 1 DROP(S): at 06:37

## 2018-01-23 RX ADMIN — FLUDROCORTISONE ACETATE 0.1 MILLIGRAM(S): 0.1 TABLET ORAL at 18:40

## 2018-01-23 RX ADMIN — Medication 50 MILLIGRAM(S): at 18:40

## 2018-01-23 RX ADMIN — Medication 1 DROP(S): at 12:13

## 2018-01-23 NOTE — PROGRESS NOTE ADULT - SUBJECTIVE AND OBJECTIVE BOX
SUBJECTIVE: Patient is comfortable. no new complaints       REVIEW OF SYSTEMS:  CONSTITUTIONAL: No weakness, fevers or chills  EYES/ENT: patient is loosing vision;  No vertigo or throat pain   NECK: No pain or stiffness  RESPIRATORY: No cough, wheezing, hemoptysis; No shortness of breath  CARDIOVASCULAR: No chest pain or palpitations  GASTROINTESTINAL: No abdominal or epigastric pain. No nausea, vomiting, or hematemesis; No constipation or diarrhea. No melena or hematochezia.  GENITOURINARY: No dysuria, frequency , hematuria, flank pain or nocturia  NEUROLOGICAL: No numbness or weakness  SKIN: No itching, burning, rashes, or lesions   All other review of systems is negative unless indicated above    MEDICATIONS  (STANDING):  artificial  tears Solution 1 Drop(s) Both EYES every 6 hours  aspirin  chewable 81 milliGRAM(s) Oral daily  cholecalciferol 1000 Unit(s) Oral daily  cyanocobalamin 1000 MICROGram(s) Oral daily  dextrose 5%. 1000 milliLiter(s) (50 mL/Hr) IV Continuous <Continuous>  dextrose 50% Injectable 12.5 Gram(s) IV Push once  dextrose 50% Injectable 25 Gram(s) IV Push once  dextrose 50% Injectable 25 Gram(s) IV Push once  metoprolol     tartrate 50 milliGRAM(s) Oral two times a day  ofloxacin 0.3% Solution 1 Drop(s) Both EYES four times a day  oseltamivir 30 milliGRAM(s) Oral daily  prednisoLONE acetate 1% Suspension 1 Drop(s) Both EYES two times a day  simvastatin 40 milliGRAM(s) Oral at bedtime  sodium chloride 0.45%. 1000 milliLiter(s) (60 mL/Hr) IV Continuous <Continuous>    MEDICATIONS  (PRN):  acetaminophen   Tablet 650 milliGRAM(s) Oral every 6 hours PRN For Temp greater than 38 C (100.4 F)  dextrose Gel 1 Dose(s) Oral once PRN Blood Glucose LESS THAN 70 milliGRAM(s)/deciliter  glucagon  Injectable 1 milliGRAM(s) IntraMuscular once PRN Glucose LESS THAN 70 milligrams/deciliter  glucagon  Injectable 1 milliGRAM(s) IntraMuscular once PRN Glucose LESS THAN 70 milligrams/deciliter  guaiFENesin   Syrup  (Sugar-Free) 200 milliGRAM(s) Oral every 6 hours PRN Cough      Vital Signs:   Vital Signs Last 24 Hrs  T(C): 36.9 (23 Jan 2018 07:56), Max: 37.1 (22 Jan 2018 21:16)  T(F): 98.5 (23 Jan 2018 07:56), Max: 98.7 (22 Jan 2018 21:16)  HR: 89 (23 Jan 2018 07:56) (79 - 89)  BP: 159/93 (23 Jan 2018 07:56) (115/72 - 168/98)  BP(mean): --  RR: 16 (23 Jan 2018 07:56) (16 - 18)  SpO2: 99% (23 Jan 2018 07:56) (97% - 100%)    PHYSICAL EXAM:  Constitutional: well developed, well nourished  and in nad  HEENT: Patient has strabismus and reports decreasing vision. PERRL,  no icteric sclera and mild pallor of conjunctiva noted  Neck: No JVD, thyromegaly or adenopathy  Respiratory: reduced air entry lower lungs with no rales, wheezing or rhonchi  Cardiovascular: S1 and S2 normally heard  Gastrointestinal: soft, nondistended, nontender and normal bowel sounds heard  Extremities: No peripheral edema or cyanosis  Neurological: A/O x 3, no focal deficits  : No flank or cva tenderness palpated.  Skin: No rashes      LABS:                                        9.9    2.6   )-----------( 103      ( 23 Jan 2018 07:42 )             31.2   01-23    141  |  106  |  39<H>  ----------------------------<  161<H>  4.4   |  26  |  4.62<H>    Ca    8.3<L>      23 Jan 2018 07:42  Phos  3.7     01-22  Mg     2.2     01-22 SUBJECTIVE: Patient is comfortable. no new complaints   pt is scheduled for new avf tomorrow  REVIEW OF SYSTEMS:  CONSTITUTIONAL: No weakness, fevers or chills  EYES/ENT: patient is loosing vision;  No vertigo or throat pain   NECK: No pain or stiffness  RESPIRATORY: No cough, wheezing, hemoptysis; No shortness of breath  CARDIOVASCULAR: No chest pain or palpitations  GASTROINTESTINAL: No abdominal or epigastric pain. No nausea, vomiting, or hematemesis; No constipation or diarrhea. No melena or hematochezia.  GENITOURINARY: No dysuria, frequency , hematuria, flank pain or nocturia  NEUROLOGICAL: No numbness or weakness  SKIN: No itching, burning, rashes, or lesions   All other review of systems is negative unless indicated above    MEDICATIONS  (STANDING):  artificial  tears Solution 1 Drop(s) Both EYES every 6 hours  aspirin  chewable 81 milliGRAM(s) Oral daily  cholecalciferol 1000 Unit(s) Oral daily  cyanocobalamin 1000 MICROGram(s) Oral daily  dextrose 5%. 1000 milliLiter(s) (50 mL/Hr) IV Continuous <Continuous>  dextrose 50% Injectable 12.5 Gram(s) IV Push once  dextrose 50% Injectable 25 Gram(s) IV Push once  dextrose 50% Injectable 25 Gram(s) IV Push once  metoprolol     tartrate 50 milliGRAM(s) Oral two times a day  ofloxacin 0.3% Solution 1 Drop(s) Both EYES four times a day  oseltamivir 30 milliGRAM(s) Oral daily  prednisoLONE acetate 1% Suspension 1 Drop(s) Both EYES two times a day  simvastatin 40 milliGRAM(s) Oral at bedtime  sodium chloride 0.45%. 1000 milliLiter(s) (60 mL/Hr) IV Continuous <Continuous>    MEDICATIONS  (PRN):  acetaminophen   Tablet 650 milliGRAM(s) Oral every 6 hours PRN For Temp greater than 38 C (100.4 F)  dextrose Gel 1 Dose(s) Oral once PRN Blood Glucose LESS THAN 70 milliGRAM(s)/deciliter  glucagon  Injectable 1 milliGRAM(s) IntraMuscular once PRN Glucose LESS THAN 70 milligrams/deciliter  glucagon  Injectable 1 milliGRAM(s) IntraMuscular once PRN Glucose LESS THAN 70 milligrams/deciliter  guaiFENesin   Syrup  (Sugar-Free) 200 milliGRAM(s) Oral every 6 hours PRN Cough      Vital Signs:   Vital Signs Last 24 Hrs  T(C): 36.9 (23 Jan 2018 07:56), Max: 37.1 (22 Jan 2018 21:16)  T(F): 98.5 (23 Jan 2018 07:56), Max: 98.7 (22 Jan 2018 21:16)  HR: 89 (23 Jan 2018 07:56) (79 - 89)  BP: 159/93 (23 Jan 2018 07:56) (115/72 - 168/98)  BP(mean): --  RR: 16 (23 Jan 2018 07:56) (16 - 18)  SpO2: 99% (23 Jan 2018 07:56) (97% - 100%)    PHYSICAL EXAM:  Constitutional: well developed, well nourished  and in nad  HEENT: Patient has strabismus and reports decreasing vision. PERRL,  no icteric sclera and mild pallor of conjunctiva noted  Neck: No JVD, thyromegaly or adenopathy  Respiratory: reduced air entry lower lungs with no rales, wheezing or rhonchi  Cardiovascular: S1 and S2 normally heard  Gastrointestinal: soft, nondistended, nontender and normal bowel sounds heard  Extremities: No peripheral edema or cyanosis  Neurological: A/O x 3, no focal deficits  : No flank or cva tenderness palpated.  Skin: No rashes      LABS:                                        9.9    2.6   )-----------( 103      ( 23 Jan 2018 07:42 )             31.2   01-23    141  |  106  |  39<H>  ----------------------------<  161<H>  4.4   |  26  |  4.62<H>    Ca    8.3<L>      23 Jan 2018 07:42  Phos  3.7     01-22  Mg     2.2     01-22

## 2018-01-23 NOTE — PROGRESS NOTE ADULT - ASSESSMENT
A/P:   1.CKD: stage 4, secondary to dm/htn/smoking.   -elevated Cr is at baseline. patient is agreeable for avf for future dialysis  -seen by vascular for AV fistula 1/24  -Keep patient euvolemic and renal diet  -Avoid Nephrotoxic Meds/ Agents such as (NSAIDs, IV contrast, Aminoglycosides such as gentamicin, -Gadolinium contrast, Phosphate containing enemas, etc..)  -Adjust Medications according to eGFR  -f/u bmp daily  - f/u with Dr Lambert for post discharge renal care  - no need for HD at this time     2.Hypoglycemia: patient has diarrhea and reports poor intake. continue supplementation. encourage increase po intake     3. Hypokalemia: now k is nl  -keep K >4 and <5  -f/u K level daily    4.Anemia:MF  -may need procrit once bp is controlled  -f/u Hb daily    5.Htn: BP is mildly high  -please  titrate bp meds as ordered to keep sbp<130  -avoid Nahco3    6.MBD:secondary to ckd  -pt ha sacceptble pth and phos level  7. Diarrhea  consider loperamide if C diff is ruled out A/P:   1.CKD: stage 4, secondary to dm/htn/smoking.   -pts renal function is mildly worsening today , may be secondary to hemodynamic related  azotemia  - patient is agreeable for avf for future dialysis  -seen by vascular for AV fistula 1/24  -Keep patient euvolemic and renal diet  -Avoid Nephrotoxic Meds/ Agents such as (NSAIDs, IV contrast, Aminoglycosides such as gentamicin, -Gadolinium contrast, Phosphate containing enemas, etc..)  -Adjust Medications according to eGFR  -f/u bmp daily  - f/u with Dr Lambert for post discharge renal care  - no need for HD at this time     2.Hypoglycemia: patient has diarrhea and reports poor intake. continue supplementation. encourage increase po intake     3. Hypokalemia: now k is nl  -keep K >4 and <5  -f/u K level daily    4.Anemia:MF  -may need procrit once bp is controlled  -f/u Hb daily    5.Htn: BP is mildly high  -please  titrate bp meds as ordered to keep sbp<130  -avoid Nahco3    6.MBD:secondary to ckd  -pt ha sacceptble pth and phos level  7. Diarrhea  consider loperamide if C diff is ruled out

## 2018-01-23 NOTE — PROGRESS NOTE ADULT - SUBJECTIVE AND OBJECTIVE BOX
Patient is a 57y old  Male who presents with a chief complaint of chest pain (18 Jan 2018 22:58)    PATIENT IS SEEN AND EXAMINED IN MEDICAL FLOOR.    ALLERGIES:  fish (Rash)  liver (Anaphylaxis)  No Known Drug Allergies    VITALS:    Vital Signs Last 24 Hrs  T(C): 37.3 (23 Jan 2018 15:20), Max: 37.3 (23 Jan 2018 15:20)  T(F): 99.1 (23 Jan 2018 15:20), Max: 99.1 (23 Jan 2018 15:20)  HR: 73 (23 Jan 2018 18:39) (73 - 89)  BP: 123/74 (23 Jan 2018 18:39) (115/72 - 168/98)  BP(mean): --  RR: 16 (23 Jan 2018 15:20) (16 - 18)  SpO2: 100% (23 Jan 2018 15:20) (97% - 100%)    LABS:  CBC Full  -  ( 23 Jan 2018 07:42 )  WBC Count : 2.6 K/uL  Hemoglobin : 9.9 g/dL  Hematocrit : 31.2 %  Platelet Count - Automated : 103 K/uL  Mean Cell Volume : 95.8 fl  Mean Cell Hemoglobin : 30.4 pg  Mean Cell Hemoglobin Concentration : 31.7 gm/dL  Auto Neutrophil # : x  Auto Lymphocyte # : x  Auto Monocyte # : x  Auto Eosinophil # : x  Auto Basophil # : x  Auto Neutrophil % : x  Auto Lymphocyte % : x  Auto Monocyte % : x  Auto Eosinophil % : x  Auto Basophil % : x      01-23    141  |  106  |  39<H>  ----------------------------<  161<H>  4.4   |  26  |  4.62<H>    Ca    8.3<L>      23 Jan 2018 07:42  Phos  3.7     01-22  Mg     2.2     01-22      CAPILLARY BLOOD GLUCOSE      POCT Blood Glucose.: 212 mg/dL (23 Jan 2018 11:56)  POCT Blood Glucose.: 152 mg/dL (23 Jan 2018 08:33)  POCT Blood Glucose.: 176 mg/dL (22 Jan 2018 22:02)  POCT Blood Glucose.: 142 mg/dL (22 Jan 2018 21:17)                .Blood Blood-Peripheral  12-11 @ 18:56   No growth at 5 days.  --  --          MEDICATIONS:    MEDICATIONS  (STANDING):  amLODIPine   Tablet 5 milliGRAM(s) Oral daily  artificial  tears Solution 1 Drop(s) Both EYES every 6 hours  aspirin  chewable 81 milliGRAM(s) Oral daily  cholecalciferol 1000 Unit(s) Oral daily  cyanocobalamin 1000 MICROGram(s) Oral daily  dextrose 5%. 1000 milliLiter(s) (50 mL/Hr) IV Continuous <Continuous>  dextrose 50% Injectable 12.5 Gram(s) IV Push once  dextrose 50% Injectable 25 Gram(s) IV Push once  dextrose 50% Injectable 25 Gram(s) IV Push once  fludroCORTISONE 0.1 milliGRAM(s) Oral two times a day  heparin  Injectable 5000 Unit(s) SubCutaneous every 12 hours  metoprolol     tartrate 50 milliGRAM(s) Oral two times a day  ofloxacin 0.3% Solution 1 Drop(s) Both EYES four times a day  prednisoLONE acetate 1% Suspension 1 Drop(s) Both EYES two times a day  simvastatin 40 milliGRAM(s) Oral at bedtime      MEDICATIONS  (PRN):  acetaminophen   Tablet 650 milliGRAM(s) Oral every 6 hours PRN For Temp greater than 38 C (100.4 F)  dextrose Gel 1 Dose(s) Oral once PRN Blood Glucose LESS THAN 70 milliGRAM(s)/deciliter  glucagon  Injectable 1 milliGRAM(s) IntraMuscular once PRN Glucose LESS THAN 70 milligrams/deciliter  glucagon  Injectable 1 milliGRAM(s) IntraMuscular once PRN Glucose LESS THAN 70 milligrams/deciliter  guaiFENesin   Syrup  (Sugar-Free) 200 milliGRAM(s) Oral every 6 hours PRN Cough      REVIEW OF SYSTEMS:                           ALL ROS DONE [ X   ]    CONSTITUTIONAL:  LETHARGIC [   ], FEVER [   ], UNRESPONSIVE [   ]  CVS:  CP  [   ], SOB, [   ], PALPITATIONS [   ], DIZZYNESS [   ]  RS: COUGH [   ], SPUTUM [   ]  GI: ABDOMINAL PAIN [   ], NAUSEA [   ], VOMITINGS [   ], DIARRHEA [   ], CONSTIPATION [   ]  :  DYSURIA [   ], NOCTURIA [   ], INCREASED FREQUENCY [   ], DRIBLING [   ],  SKELETAL: PAINFUL JOINTS [   ], SWOLLEN JOINTS [   ], NECK ACHE [   ], LOW BACK ACHE [   ],  SKIN : ULCERS [   ], RASH [   ], ITCHING [   ]  CNS: HEAD ACHE [   ], DOUBLE VISION [   ], BLURRED VISION [   ], AMS / CONFUSION [   ], SEIZURES [   ], WEAKNESS [   ],TINGLING / NUMBNESS [   ]    PHYSICAL EXAMINATION:  GENERAL APPEARANCE: NO DISTRESS  HEENT:   PALLOR +,  NO  JVD,  NO   NODES, NECK SUPPLE  CVS: S1 +, S2 +,   RS: AEEB,  OCCASIONAL  RALES +,  B/L MILD RONCHI +  ABD: SOFT, NT, NO, BS +  EXT: NO PE, LEFT BKA   SKIN: WARM,   SKELETAL:  ROM ACCEPTABLE  CNS:  AAO X 3   ,NO   DEFICITS    RADIOLOGY :      ASSESSMENT :     Hypokalemia  Coronary artery disease  Glaucoma  Anemia  CKD (chronic kidney disease)  Adrenal insufficiency  Hypertension  Below knee amputation status, left      PLAN:  HPI:  Patient is a 57M from [facility] with PMH anemia (baseline Hb 8-9), CKD (baseline Cr 4), CHF (last echo Dec 2017 normal), OM s/p L BKA, CAD on aspirin, glaucoma, HTN, adrenal insufficiency on florinef, sent from NH for patient reporting chest pain. Patient was seen and examined upon admission, denies fever, chills, SOB, palpitations, chest pain, nausea, diarrhea or constipation but states that he was sent due to vomiting x 1 and continued cough, not chest pain. He was seen in [facility] in Dec 2017 for chest pain and cough, found to be + flu, finished course of tamiflu, and at that time, refused IV fluids for CKD and was advised to see outpatient nephro for AV fistula placement in preparation for dialysis. Was seen by cardio also at that time. In the ED, found to have K 2.6, given supplementation, EKG shows NSR, qtc 480, 1st deg av block 214msec. In no acute distress. Given glucagon and d50 x 1 due to FS 45, as per NH papers, has PRN glucagon order for FS <70 as patient has been having poor po intake and is on florinef, also has not eaten anything today. (18 Jan 2018 22:58)    - D/W VASCULAR MD - DR. JEFFERSON. PATIENT IS SCHEDULED FOR AVF ON WEDNESDAY.  PATIENT IS MODERATE SURGICAL RISK.   - INFLUENZA B URTI, ACUTE BRONCHITIS ON TAMIFLU  (  day # 5 ) DC TAMIFLU AND DROP LET PRECAUTIONS IN AM  - CKD STAGE 4, ANEMIA OF CKD - VASCULAR TEAM TO CONSIDER PLACING AVF ( DR. JEFFERSON ) -  RENAL F/UP IS IN PROGRESS.  - D/W PATIENT AND STAFF  - DR. MAC

## 2018-01-23 NOTE — PROGRESS NOTE ADULT - ASSESSMENT
58yo M with ESRD requiring HD  1. Plan for L AVF in AM  2. NPO after midnight  3. AM labs/type and screen  4. medical clearance noted  5. Consent obtained and witnessed, placed in medical chart

## 2018-01-23 NOTE — PROGRESS NOTE ADULT - SUBJECTIVE AND OBJECTIVE BOX
Surgery Pre-op Note    Preoperative Diagnosis: End stage renal disease    Planned Procedure: Left arteriovenous fistula creation                          9.9    2.6   )-----------( 103      ( 23 Jan 2018 07:42 )             31.2       01-23    141  |  106  |  39<H>  ----------------------------<  161<H>  4.4   |  26  |  4.62<H>    Ca    8.3<L>      23 Jan 2018 07:42  Phos  3.7     01-22  Mg     2.2     01-22        EKG:  < from: 12 Lead ECG (01.18.18 @ 20:24) >  Ventricular Rate 96 BPM    Atrial Rate 96 BPM    P-R Interval 214 ms    QRS Duration 84 ms     ms    QTc 480 ms    P Axis 88 degrees    R Axis 8 degrees    T Axis 75 degrees    Diagnosis Line Sinus rhythm with 1st degree A-V block  Left ventricular hypertrophy with repolarization abnormality  Prolonged QT  Abnormal ECG    < end of copied text >      Echo:  < from: Transthoracic Echocardiogram (12.12.17 @ 08:06) >  CONCLUSIONS:  1. Normal Left Ventricular Systolic Function,  (EF = 55 to  60%)    < end of copied text >      CXR:  < from: Xray Chest 2 Views PA/Lat (01.18.18 @ 19:55) >  Impression: No radiographic evidence for acute cardiopulmonary disease.    < end of copied text >

## 2018-01-24 DIAGNOSIS — I77.0 ARTERIOVENOUS FISTULA, ACQUIRED: ICD-10-CM

## 2018-01-24 PROCEDURE — 93990 DOPPLER FLOW TESTING: CPT | Mod: 26

## 2018-01-24 PROCEDURE — 99231 SBSQ HOSP IP/OBS SF/LOW 25: CPT | Mod: NC

## 2018-01-24 RX ADMIN — Medication 1000 UNIT(S): at 12:42

## 2018-01-24 RX ADMIN — PREGABALIN 1000 MICROGRAM(S): 225 CAPSULE ORAL at 12:42

## 2018-01-24 RX ADMIN — Medication 81 MILLIGRAM(S): at 12:42

## 2018-01-24 RX ADMIN — SIMVASTATIN 40 MILLIGRAM(S): 20 TABLET, FILM COATED ORAL at 22:00

## 2018-01-24 RX ADMIN — FLUDROCORTISONE ACETATE 0.1 MILLIGRAM(S): 0.1 TABLET ORAL at 18:29

## 2018-01-24 NOTE — PROGRESS NOTE ADULT - PROBLEM SELECTOR PLAN 1
c/w tamiflu x 5 days  robitussin PRN cough  tylenol for fever   droplet isolation
Patient was scheduled for AVF today in the OR, patient refused surgery and wants to reschedule the surgery as an outpt.  Please no Punctures in R UE (Right)  As per Vascular:  L UE: no adequate veins for AVF.  R UE: marginal cephalic v.  F/u as outpatient with Dr. Chaudhry for Right AVF

## 2018-01-24 NOTE — PROGRESS NOTE ADULT - SUBJECTIVE AND OBJECTIVE BOX
PGY 1 Note discussed with supervising resident and primary attending    Patient is a 57y old  Male who presents with a chief complaint of chest pain    INTERVAL HPI/OVERNIGHT EVENTS: offers no new complaints; current symptoms resolving    MEDICATIONS  (STANDING):  amLODIPine   Tablet 5 milliGRAM(s) Oral daily  artificial  tears Solution 1 Drop(s) Both EYES every 6 hours  aspirin  chewable 81 milliGRAM(s) Oral daily  cholecalciferol 1000 Unit(s) Oral daily  cyanocobalamin 1000 MICROGram(s) Oral daily  dextrose 5%. 1000 milliLiter(s) (50 mL/Hr) IV Continuous <Continuous>  dextrose 50% Injectable 12.5 Gram(s) IV Push once  dextrose 50% Injectable 25 Gram(s) IV Push once  dextrose 50% Injectable 25 Gram(s) IV Push once  fludroCORTISONE 0.1 milliGRAM(s) Oral two times a day  heparin  Injectable 5000 Unit(s) SubCutaneous every 12 hours  metoprolol     tartrate 50 milliGRAM(s) Oral two times a day  ofloxacin 0.3% Solution 1 Drop(s) Both EYES four times a day  prednisoLONE acetate 1% Suspension 1 Drop(s) Both EYES two times a day  simvastatin 40 milliGRAM(s) Oral at bedtime    MEDICATIONS  (PRN):  acetaminophen   Tablet 650 milliGRAM(s) Oral every 6 hours PRN For Temp greater than 38 C (100.4 F)  dextrose Gel 1 Dose(s) Oral once PRN Blood Glucose LESS THAN 70 milliGRAM(s)/deciliter  glucagon  Injectable 1 milliGRAM(s) IntraMuscular once PRN Glucose LESS THAN 70 milligrams/deciliter  glucagon  Injectable 1 milliGRAM(s) IntraMuscular once PRN Glucose LESS THAN 70 milligrams/deciliter  guaiFENesin   Syrup  (Sugar-Free) 200 milliGRAM(s) Oral every 6 hours PRN Cough  __________________________________________________  REVIEW OF SYSTEMS:    CONSTITUTIONAL: No fever,   EYES: no acute visual disturbances  NECK: No pain or stiffness  RESPIRATORY: No cough; No shortness of breath  CARDIOVASCULAR: No chest pain, no palpitations  GASTROINTESTINAL: No pain. No nausea or vomiting; No diarrhea   NEUROLOGICAL: No headache or numbness, no tremors  MUSCULOSKELETAL: No joint pain, no muscle pain  GENITOURINARY: no dysuria, no frequency, no hesitancy  PSYCHIATRY: no depression , no anxiety  ALL OTHER  ROS negative      Vital Signs Last 24 Hrs  HR: 57 (24 Jan 2018 06:00) (57 - 73)  BP: 130/79 (24 Jan 2018 06:00) (123/74 - 130/79)  ________________________________________________  PHYSICAL EXAM:  GENERAL: NAD  HEENT: Normocephalic;  conjunctivae and sclerae clear; moist mucous membranes;   NECK : supple  CHEST/LUNG: Clear to auscultation bilaterally with good air entry   HEART: S1 S2  regular; no murmurs, gallops or rubs  ABDOMEN: Soft, Nontender, Nondistended; Bowel sounds present  EXTREMITIES: no cyanosis; no edema; no calf tenderness  SKIN: warm and dry; no rash  NERVOUS SYSTEM:  Awake and alert; Oriented  to place, person and time ; no new deficits    _________________________________________________  LABS:                        9.2    2.8   )-----------( 109      ( 24 Jan 2018 07:30 )             28.6     01-24    138  |  109<H>  |  45<H>  ----------------------------<  156<H>  5.0   |  22  |  4.72<H>    Ca    7.9<L>      24 Jan 2018 07:30      PT/INR - ( 24 Jan 2018 07:30 )   PT: 9.3 sec;   INR: 0.86 ratio

## 2018-01-24 NOTE — PROGRESS NOTE ADULT - SUBJECTIVE AND OBJECTIVE BOX
Pt seen in vasc lab in am,  L UE: no adequate veins for AVF.  R UE: marginal cephalic v.    DW'ed pt- was planning AVF today in OR- currently pt adamantly declines to proceeed w surgery.  Wants to be dc'ed and reschedule outpt.    Please no Punctures in R UE (Right)  Will plan Right AVf when pt agrreeable.  Fu outpt if dc'ed.

## 2018-01-24 NOTE — PROGRESS NOTE ADULT - PROBLEM SELECTOR PLAN 6
holding florinef 2/2 hypokalemia  replace  restart when K within blanquita range  monitor BP for hypotension off of steroids
CKD (baseline Cr is 4  today creatinin 3.95   IV hydration hydration   UA and urine lytes

## 2018-01-24 NOTE — PROGRESS NOTE ADULT - SUBJECTIVE AND OBJECTIVE BOX
SUBJECTIVE: Patient was scheduled for AVF today however didn't get the procedure. Now patient wants to go home.       REVIEW OF SYSTEMS:  CONSTITUTIONAL: No weakness, fevers or chills  EYES/ENT: patient is loosing vision;  No vertigo or throat pain   NECK: No pain or stiffness  RESPIRATORY: No cough, wheezing, hemoptysis; No shortness of breath  CARDIOVASCULAR: No chest pain or palpitations  GASTROINTESTINAL: No abdominal or epigastric pain. No nausea, vomiting, or hematemesis; No constipation or diarrhea. No melena or hematochezia.  GENITOURINARY: No dysuria, frequency , hematuria, flank pain or nocturia  NEUROLOGICAL: No numbness or weakness  SKIN: No itching, burning, rashes, or lesions   All other review of systems is negative unless indicated above    MEDICATIONS  (STANDING):  amLODIPine   Tablet 5 milliGRAM(s) Oral daily  artificial  tears Solution 1 Drop(s) Both EYES every 6 hours  aspirin  chewable 81 milliGRAM(s) Oral daily  cholecalciferol 1000 Unit(s) Oral daily  cyanocobalamin 1000 MICROGram(s) Oral daily  dextrose 5%. 1000 milliLiter(s) (50 mL/Hr) IV Continuous <Continuous>  dextrose 50% Injectable 12.5 Gram(s) IV Push once  dextrose 50% Injectable 25 Gram(s) IV Push once  dextrose 50% Injectable 25 Gram(s) IV Push once  fludroCORTISONE 0.1 milliGRAM(s) Oral two times a day  heparin  Injectable 5000 Unit(s) SubCutaneous every 12 hours  metoprolol     tartrate 50 milliGRAM(s) Oral two times a day  ofloxacin 0.3% Solution 1 Drop(s) Both EYES four times a day  prednisoLONE acetate 1% Suspension 1 Drop(s) Both EYES two times a day  simvastatin 40 milliGRAM(s) Oral at bedtime    MEDICATIONS  (PRN):  acetaminophen   Tablet 650 milliGRAM(s) Oral every 6 hours PRN For Temp greater than 38 C (100.4 F)  dextrose Gel 1 Dose(s) Oral once PRN Blood Glucose LESS THAN 70 milliGRAM(s)/deciliter  glucagon  Injectable 1 milliGRAM(s) IntraMuscular once PRN Glucose LESS THAN 70 milligrams/deciliter  glucagon  Injectable 1 milliGRAM(s) IntraMuscular once PRN Glucose LESS THAN 70 milligrams/deciliter  guaiFENesin   Syrup  (Sugar-Free) 200 milliGRAM(s) Oral every 6 hours PRN Cough      Vital Signs:   Vital Signs Last 24 Hrs  T(C): 37.3 (23 Jan 2018 15:20), Max: 37.3 (23 Jan 2018 15:20)  T(F): 99.1 (23 Jan 2018 15:20), Max: 99.1 (23 Jan 2018 15:20)  HR: 57 (24 Jan 2018 06:00) (57 - 74)  BP: 130/79 (24 Jan 2018 06:00) (123/74 - 130/79)  BP(mean): --  RR: 16 (23 Jan 2018 15:20) (16 - 16)  SpO2: 100% (23 Jan 2018 15:20) (100% - 100%)    I/o not documented         PHYSICAL EXAM:  Constitutional: well developed, well nourished  and in nad  HEENT: Patient has strabismus and reports decreasing vision.  PERRL,  no icteric sclera and mild pallor of conjunctiva noted  Neck: No JVD, thyromegaly or adenopathy  Respiratory: reduced air entry lower lungs with no rales, wheezing or rhonchi  Cardiovascular: S1 and S2 normally heard  Gastrointestinal: soft, nondistended, nontender and normal bowel sounds heard  Extremities: No peripheral edema or cyanosis  Neurological: A/O x 3, no focal deficits  : No flank or cva tenderness palpated.  Skin: No rashes      LABS:                                          9.2    2.8   )-----------( 109      ( 24 Jan 2018 07:30 )             28.6   01-24    138  |  109<H>  |  45<H>  ----------------------------<  156<H>  5.0   |  22  |  4.72<H>    Ca    7.9<L>      24 Jan 2018 07:30 SUBJECTIVE: Patient was scheduled for AVF today however didn't get the procedure. Now patient wants to go home.   pt denies cp,sob ,gi or gu symptons    REVIEW OF SYSTEMS:  CONSTITUTIONAL: No weakness, fevers or chills  EYES/ENT: patient is loosing vision;  No vertigo or throat pain   NECK: No pain or stiffness  RESPIRATORY: No cough, wheezing, hemoptysis; No shortness of breath  CARDIOVASCULAR: No chest pain or palpitations  GASTROINTESTINAL: No abdominal or epigastric pain. No nausea, vomiting, or hematemesis; No constipation or diarrhea. No melena or hematochezia.  GENITOURINARY: No dysuria, frequency , hematuria, flank pain or nocturia  NEUROLOGICAL: No numbness or weakness  SKIN: No itching, burning, rashes, or lesions   All other review of systems is negative unless indicated above    MEDICATIONS  (STANDING):  amLODIPine   Tablet 5 milliGRAM(s) Oral daily  artificial  tears Solution 1 Drop(s) Both EYES every 6 hours  aspirin  chewable 81 milliGRAM(s) Oral daily  cholecalciferol 1000 Unit(s) Oral daily  cyanocobalamin 1000 MICROGram(s) Oral daily  dextrose 5%. 1000 milliLiter(s) (50 mL/Hr) IV Continuous <Continuous>  dextrose 50% Injectable 12.5 Gram(s) IV Push once  dextrose 50% Injectable 25 Gram(s) IV Push once  dextrose 50% Injectable 25 Gram(s) IV Push once  fludroCORTISONE 0.1 milliGRAM(s) Oral two times a day  heparin  Injectable 5000 Unit(s) SubCutaneous every 12 hours  metoprolol     tartrate 50 milliGRAM(s) Oral two times a day  ofloxacin 0.3% Solution 1 Drop(s) Both EYES four times a day  prednisoLONE acetate 1% Suspension 1 Drop(s) Both EYES two times a day  simvastatin 40 milliGRAM(s) Oral at bedtime    MEDICATIONS  (PRN):  acetaminophen   Tablet 650 milliGRAM(s) Oral every 6 hours PRN For Temp greater than 38 C (100.4 F)  dextrose Gel 1 Dose(s) Oral once PRN Blood Glucose LESS THAN 70 milliGRAM(s)/deciliter  glucagon  Injectable 1 milliGRAM(s) IntraMuscular once PRN Glucose LESS THAN 70 milligrams/deciliter  glucagon  Injectable 1 milliGRAM(s) IntraMuscular once PRN Glucose LESS THAN 70 milligrams/deciliter  guaiFENesin   Syrup  (Sugar-Free) 200 milliGRAM(s) Oral every 6 hours PRN Cough      Vital Signs:   Vital Signs Last 24 Hrs  T(C): 37.3 (23 Jan 2018 15:20), Max: 37.3 (23 Jan 2018 15:20)  T(F): 99.1 (23 Jan 2018 15:20), Max: 99.1 (23 Jan 2018 15:20)  HR: 57 (24 Jan 2018 06:00) (57 - 74)  BP: 130/79 (24 Jan 2018 06:00) (123/74 - 130/79)  BP(mean): --  RR: 16 (23 Jan 2018 15:20) (16 - 16)  SpO2: 100% (23 Jan 2018 15:20) (100% - 100%)    I/o not documented         PHYSICAL EXAM:  Constitutional: well developed, well nourished  and in nad  HEENT: Patient has strabismus and reports decreasing vision.  PERRL,  no icteric sclera and mild pallor of conjunctiva noted  Neck: No JVD, thyromegaly or adenopathy  Respiratory: reduced air entry lower lungs with no rales, wheezing or rhonchi  Cardiovascular: S1 and S2 normally heard  Gastrointestinal: soft, nondistended, nontender and normal bowel sounds heard  Extremities: No peripheral edema or cyanosis  Neurological: A/O x 3, no focal deficits  : No flank or cva tenderness palpated.  Skin: No rashes      LABS:                                          9.2    2.8   )-----------( 109      ( 24 Jan 2018 07:30 )             28.6   01-24    138  |  109<H>  |  45<H>  ----------------------------<  156<H>  5.0   |  22  |  4.72<H>    Ca    7.9<L>      24 Jan 2018 07:30

## 2018-01-24 NOTE — PROGRESS NOTE ADULT - PROBLEM SELECTOR PLAN 4
Patient will have AV fistula placement as outpatient in preparation to have dialysis  Vascular consult appreciated. Dr. Chaudhry  Nephrology consult appreciated.

## 2018-01-24 NOTE — PROGRESS NOTE ADULT - PROVIDER SPECIALTY LIST ADULT
Internal Medicine
Nephrology
Vascular Surgery
Vascular Surgery
Internal Medicine

## 2018-01-24 NOTE — PROGRESS NOTE ADULT - PROBLEM SELECTOR PLAN 2
resolved, today K 4.8  will continue IV fluids with potassium (pt vomited x 2)   telemetry monitoring  Dr King
K 2.6 on admission --> 2.9 s/p 40meq po and 1 rider 10meq  potassium 40meq x1, will repeat another dose, 10meq x 1  f/u am BMP  baseline hypokalemia on florinef, holding  Dr. King- nephsydney

## 2018-01-24 NOTE — PROGRESS NOTE ADULT - ASSESSMENT
Patient is a 57M from [facility] with PMH anemia, CKD (baseline Cr 4), CHF (last echo Dec 2017 normal), OM s/p L BKA, CAD on aspirin, glaucoma, HTN, adrenal insufficiency on florinef, sent from NH for patient reporting chest pain. Patient was seen and examined upon admission, denies fever, chills, SOB, palpitations, chest pain, nausea, diarrhea or constipation but states that he was sent due to vomiting x 1, not chest pain. Admitted for hypokalemia, ACS rule out.

## 2018-01-24 NOTE — PROGRESS NOTE ADULT - ASSESSMENT
A/P:   1.CKD: stage 4, secondary to dm/htn/smoking.   -pts renal function is mildly worsening today , may be secondary to hemodynamic related  azotemia  -seen by vascular for AV fistula; to be done outpatient   -Keep patient euvolemic and renal diet  -Avoid Nephrotoxic Meds/ Agents such as (NSAIDs, IV contrast, Aminoglycosides such as gentamicin, -Gadolinium contrast, Phosphate containing enemas, etc..)  -Adjust Medications according to eGFR  -f/u bmp daily  - f/u with Dr Lambert for post discharge renal care  - no need for HD at this time     2.Hypoglycemia: patient has diarrhea and reports poor intake. continue supplementation. encourage increase po intake     3. Hypokalemia: now k is nl  -keep K >4 and <5  -f/u K level daily    4.Anemia:MF  -may need procrit once bp is controlled  -f/u Hb daily    5.Htn: BP is mildly high  -please  titrate bp meds as ordered to keep sbp<130  -avoid Nahco3    6.MBD:secondary to ckd  -pt has acceptable pth and phos level  7. Diarrhea  consider loperamide if C diff is ruled out A/P:   1.CKD: stage 4, secondary to dm/htn/smoking.   -pts renal function is mildly worsening today , may be secondary to hemodynamic related  azotemia  -seen by vascular for AV fistula; to be done outpatient   -Keep patient euvolemic and renal diet  -Avoid Nephrotoxic Meds/ Agents such as (NSAIDs, IV contrast, Aminoglycosides such as gentamicin, -Gadolinium contrast, Phosphate containing enemas, etc..)  -Adjust Medications according to eGFR  -f/u bmp daily  - f/u with Dr Lambert for post discharge renal care  - no need for HD at this time   -f/u with vascular for outpatient avf/avg as pt refusing av access placement    2.Hypoglycemia: patient has diarrhea and reports poor intake. continue supplementation. encourage increase po intake     3. Hypokalemia: now k is nl  -keep K >4 and <5  -f/u K level daily    4.Anemia:MF  -may need procrit once bp is controlled  -f/u Hb daily    5.Htn: BP is acceptble now  -please  continue current  meds as ordered  and keep sbp<130  -avoid Nahco3    6.MBD:secondary to ckd  -pt has acceptable pth and phos level

## 2018-01-24 NOTE — PROGRESS NOTE ADULT - PROBLEM SELECTOR PLAN 3
new strain of influenza on this admission  c/w tamiflu x 5 days  robitussin PRN cough  fluids
trop 1: 0.157 with hx of CKD and elevated troponins   EKG: NSR, , 1st deg av block  last echo Dec 2017 normal.   telemetry monitoring

## 2018-01-25 ENCOUNTER — TRANSCRIPTION ENCOUNTER (OUTPATIENT)
Age: 58
End: 2018-01-25

## 2018-01-25 VITALS — HEART RATE: 98 BPM | OXYGEN SATURATION: 100 % | RESPIRATION RATE: 16 BRPM

## 2018-01-25 PROCEDURE — 85610 PROTHROMBIN TIME: CPT

## 2018-01-25 PROCEDURE — 86901 BLOOD TYPING SEROLOGIC RH(D): CPT

## 2018-01-25 PROCEDURE — 87486 CHLMYD PNEUM DNA AMP PROBE: CPT

## 2018-01-25 PROCEDURE — 82310 ASSAY OF CALCIUM: CPT

## 2018-01-25 PROCEDURE — 84484 ASSAY OF TROPONIN QUANT: CPT

## 2018-01-25 PROCEDURE — 83935 ASSAY OF URINE OSMOLALITY: CPT

## 2018-01-25 PROCEDURE — 80307 DRUG TEST PRSMV CHEM ANLYZR: CPT

## 2018-01-25 PROCEDURE — 84300 ASSAY OF URINE SODIUM: CPT

## 2018-01-25 PROCEDURE — 99285 EMERGENCY DEPT VISIT HI MDM: CPT | Mod: 25

## 2018-01-25 PROCEDURE — 81001 URINALYSIS AUTO W/SCOPE: CPT

## 2018-01-25 PROCEDURE — 87633 RESP VIRUS 12-25 TARGETS: CPT

## 2018-01-25 PROCEDURE — 97161 PT EVAL LOW COMPLEX 20 MIN: CPT

## 2018-01-25 PROCEDURE — 87581 M.PNEUMON DNA AMP PROBE: CPT

## 2018-01-25 PROCEDURE — 80048 BASIC METABOLIC PNL TOTAL CA: CPT

## 2018-01-25 PROCEDURE — 87798 DETECT AGENT NOS DNA AMP: CPT

## 2018-01-25 PROCEDURE — 80053 COMPREHEN METABOLIC PANEL: CPT

## 2018-01-25 PROCEDURE — 82962 GLUCOSE BLOOD TEST: CPT

## 2018-01-25 PROCEDURE — 84156 ASSAY OF PROTEIN URINE: CPT

## 2018-01-25 PROCEDURE — 82550 ASSAY OF CK (CPK): CPT

## 2018-01-25 PROCEDURE — 82803 BLOOD GASES ANY COMBINATION: CPT

## 2018-01-25 PROCEDURE — 84133 ASSAY OF URINE POTASSIUM: CPT

## 2018-01-25 PROCEDURE — 86850 RBC ANTIBODY SCREEN: CPT

## 2018-01-25 PROCEDURE — 83605 ASSAY OF LACTIC ACID: CPT

## 2018-01-25 PROCEDURE — 83970 ASSAY OF PARATHORMONE: CPT

## 2018-01-25 PROCEDURE — 83735 ASSAY OF MAGNESIUM: CPT

## 2018-01-25 PROCEDURE — 82570 ASSAY OF URINE CREATININE: CPT

## 2018-01-25 PROCEDURE — 85027 COMPLETE CBC AUTOMATED: CPT

## 2018-01-25 PROCEDURE — 86900 BLOOD TYPING SEROLOGIC ABO: CPT

## 2018-01-25 PROCEDURE — 84100 ASSAY OF PHOSPHORUS: CPT

## 2018-01-25 PROCEDURE — 71046 X-RAY EXAM CHEST 2 VIEWS: CPT

## 2018-01-25 PROCEDURE — 93005 ELECTROCARDIOGRAM TRACING: CPT

## 2018-01-25 PROCEDURE — 93990 DOPPLER FLOW TESTING: CPT

## 2018-01-25 RX ORDER — METOPROLOL TARTRATE 50 MG
1 TABLET ORAL
Qty: 60 | Refills: 0 | OUTPATIENT
Start: 2018-01-25 | End: 2018-02-23

## 2018-01-25 NOTE — DISCHARGE NOTE ADULT - CARE PLAN
Principal Discharge DX:	Influenza  Goal:	Finished course of Tamiflu  Assessment and plan of treatment:	Patient completed course of Tamiflu for treatment of Influenza. Please follow up with your PMD in 2 weeks.  Secondary Diagnosis:	Hypertension  Goal:	Goal blood pressure <140/90  Assessment and plan of treatment:	c/w Metoprolol started as inpatient.  Secondary Diagnosis:	Glaucoma  Assessment and plan of treatment:	c/w home medications for treatment of Glaucoma  Secondary Diagnosis:	Coronary artery disease  Assessment and plan of treatment:	c/w ASA 81mg for prophylaxis  Secondary Diagnosis:	AVF (arteriovenous fistula)  Assessment and plan of treatment:	Follow up with vascular surgery for outpatient AVF placement

## 2018-01-25 NOTE — DISCHARGE NOTE ADULT - CARE PROVIDER_API CALL
Dario De La Torre (MB[employer]), Medicine  [address] [address] [place] [address]  Phone: [phone]  Fax: [phone]    Lamont Chaudhry), Vascular Surgery  [address] [facility] [address] [place] [address]  Phone: [phone]  Fax: [phone]

## 2018-01-25 NOTE — DISCHARGE NOTE ADULT - HOSPITAL COURSE
Patient is a 57M from [facility] with PMH anemia, CKD (baseline Cr 4), CHF (last echo Dec 2017 normal), OM s/p L BKA, CAD on aspirin, glaucoma, HTN, adrenal insufficiency on florinef, sent from NH for patient reporting chest pain. Patient was seen and examined upon admission, denies fever, chills, SOB, palpitations, chest pain, nausea, diarrhea or constipation but states that he was sent due to vomiting x 1, not chest pain. Admitted for hypokalemia, ACS rule out.       Problem/Plan - 1:  ·  Problem: AVF (arteriovenous fistula).  Plan: Patient was scheduled for AVF in the OR, patient refused surgery and wants to reschedule the surgery as an outpt.  Please no Punctures in R UE (Right)  As per Vascular:  L UE: no adequate veins for AVF.  R UE: marginal cephalic v.  F/u as outpatient with Dr. Chaudhry for Right AVF.      Problem/Plan - 2:  ·  Problem: Hypokalemia.  Plan: K 2.6 on admission --> 2.9 s/p 40meq po and 1 rider 10meq  potassium 40meq x1, will repeat another dose, 10meq x 1  f/u am BMP  baseline hypokalemia on florinef, holding  Dr. Don matta.      Problem/Plan - 3:  ·  Problem: Influenza.  Plan: new strain of influenza on this admission  c/w tamiflu x 5 days  robitussin PRN cough  fluids.      Problem/Plan - 4:  ·  Problem: CKD (chronic kidney disease).  Plan: Patient will have AV fistula placement as outpatient in preparation to have dialysis  Vascular consult appreciated. Dr. Chaudhry  Nephrology consult appreciated.      Problem/Plan - 5:  ·  Problem: Anemia.  Plan: baseline Hb 8-9  Hb 8.2 on admission  likely ACD 2/2 CKD  f/u anemia panel.      Problem/Plan - 6:  Problem: Adrenal insufficiency. Plan: holding florinef 2/2 hypokalemia  replace  restart when K within blanquita range  monitor BP for hypotension off of steroids.     Problem/Plan - 7:  ·  Problem: Hypertension.  Plan: no beta blocker due to prolonged SD  not on BP meds at facility  monitor BP.      Problem/Plan - 8:  ·  Problem: Coronary artery disease.  Plan: c/w aspirin.      Problem/Plan - 9:  ·  Problem: Glaucoma.  Plan: c/w eye drops  poor vision at baseline.      Problem/Plan - 10:  Problem: Need for prophylactic measure. Plan; improve score 2, immobility and age, will start heparin for dvt ppx.

## 2018-01-25 NOTE — DISCHARGE NOTE ADULT - PLAN OF CARE
c/w ASA 81mg for prophylaxis Follow up with vascular surgery for outpatient AVF placement Finished course of Tamiflu Patient completed course of Tamiflu for treatment of Influenza. Please follow up with your PMD in 2 weeks. Goal blood pressure <140/90 c/w Metoprolol started as inpatient. c/w home medications for treatment of Glaucoma

## 2018-01-25 NOTE — PHYSICAL THERAPY INITIAL EVALUATION ADULT - ADDITIONAL COMMENTS
independent in donning and doffing left BKA prosthesis; uses no assistive device, cane/walker at times

## 2018-01-25 NOTE — DISCHARGE NOTE ADULT - PATIENT PORTAL LINK FT
“You can access the Follow[employer] Patient Portal, offered by [facility], by registering with the following website: [email or web address]”

## 2018-01-25 NOTE — DISCHARGE NOTE ADULT - MEDICATION SUMMARY - MEDICATIONS TO TAKE
I will START or STAY ON the medications listed below when I get home from the hospital:    Florinef Acetate 0.1 mg oral tablet  -- 1 tab(s) by mouth 2 times a day  -- Indication: For Need for prophylactic measure    aspirin 81 mg oral tablet  -- 1 tab(s) by mouth once a day  -- Indication: For Need for prophylactic measure    sodium bicarbonate 650 mg oral tablet  -- 2 tab(s) by mouth 3 times a day  -- Indication: For Gastrointestinal upset    Lantus 100 units/mL subcutaneous solution  -- 8 unit(s) subcutaneous once a day (at bedtime)  -- Indication: For Diabetes    Benadryl 25 mg oral capsule  -- 1 cap(s) by mouth once a day (at bedtime)  -- Indication: For Emesis    Zocor 40 mg oral tablet  -- 1 tab(s) by mouth once a day (at bedtime)  -- Indication: For HLD    metoprolol tartrate 50 mg oral tablet  -- 1 tab(s) by mouth 2 times a day  -- Indication: For Hypertension    glucagon 1 mg injection  -- 1 milligram(s) injectable once a day  if FS <60  -- Indication: For Hypoglycemia    Zanaflex 2 mg oral tablet  -- 2 tab(s) by mouth 2 times a day  -- Indication: For Need for prophylactic measure    Artificial Tears ophthalmic ointment  -- 1 application to each affected eye 4 times a day  -- Indication: For Glaucoma    Ocuflox 0.3% ophthalmic solution  -- 1 drop(s) to each affected eye 4 times a day  -- Indication: For Glaucoma    Pred Forte 1% ophthalmic suspension  -- 1 drop(s) to each affected eye 2 times a day  -- Indication: For Glaucoma    Vitamin B-12 1000 mcg oral tablet  -- 1 tab(s) by mouth once a day  -- Indication: For Need for prophylactic measure    Vitamin D3 1000 intl units oral tablet  -- 1 tab(s) by mouth once a day  -- Indication: For Need for prophylactic measure

## 2018-02-17 PROBLEM — Z00.00 ENCOUNTER FOR PREVENTIVE HEALTH EXAMINATION: Status: ACTIVE | Noted: 2018-02-17

## 2018-03-19 ENCOUNTER — APPOINTMENT (OUTPATIENT)
Dept: VASCULAR SURGERY | Facility: CLINIC | Age: 58
End: 2018-03-19
Payer: MEDICAID

## 2018-03-19 VITALS
OXYGEN SATURATION: 99 % | WEIGHT: 126 LBS | SYSTOLIC BLOOD PRESSURE: 193 MMHG | DIASTOLIC BLOOD PRESSURE: 117 MMHG | TEMPERATURE: 98 F | HEART RATE: 89 BPM | HEIGHT: 62 IN | BODY MASS INDEX: 23.19 KG/M2

## 2018-03-19 PROCEDURE — 99212 OFFICE O/P EST SF 10 MIN: CPT | Mod: 25

## 2018-03-19 PROCEDURE — 93971 EXTREMITY STUDY: CPT

## 2018-05-18 ENCOUNTER — APPOINTMENT (OUTPATIENT)
Dept: VASCULAR SURGERY | Facility: CLINIC | Age: 58
End: 2018-05-18
Payer: MEDICAID

## 2018-05-18 VITALS
HEIGHT: 62 IN | DIASTOLIC BLOOD PRESSURE: 112 MMHG | WEIGHT: 128 LBS | TEMPERATURE: 98 F | HEART RATE: 99 BPM | SYSTOLIC BLOOD PRESSURE: 189 MMHG | BODY MASS INDEX: 23.55 KG/M2

## 2018-05-18 DIAGNOSIS — Z87.448 PERSONAL HISTORY OF OTHER DISEASES OF URINARY SYSTEM: ICD-10-CM

## 2018-05-18 DIAGNOSIS — I12.9 HYPERTENSIVE CHRONIC KIDNEY DISEASE WITH STAGE 1 THROUGH STAGE 4 CHRONIC KIDNEY DISEASE, OR UNSPECIFIED CHRONIC KIDNEY DISEASE: ICD-10-CM

## 2018-05-18 DIAGNOSIS — Z86.79 PERSONAL HISTORY OF OTHER DISEASES OF THE CIRCULATORY SYSTEM: ICD-10-CM

## 2018-05-18 DIAGNOSIS — Z86.69 PERSONAL HISTORY OF OTHER DISEASES OF THE NERVOUS SYSTEM AND SENSE ORGANS: ICD-10-CM

## 2018-05-18 DIAGNOSIS — N18.4 HYPERTENSIVE CHRONIC KIDNEY DISEASE WITH STAGE 1 THROUGH STAGE 4 CHRONIC KIDNEY DISEASE, OR UNSPECIFIED CHRONIC KIDNEY DISEASE: ICD-10-CM

## 2018-05-18 DIAGNOSIS — Z87.898 PERSONAL HISTORY OF OTHER SPECIFIED CONDITIONS: ICD-10-CM

## 2018-05-18 PROCEDURE — G0365: CPT

## 2018-05-18 PROCEDURE — 99204 OFFICE O/P NEW MOD 45 MIN: CPT

## 2018-05-18 RX ORDER — ACETAMINOPHEN/DIPHENHYDRAMINE 500MG-25MG
1000 TABLET ORAL
Refills: 0 | Status: ACTIVE | COMMUNITY

## 2018-05-18 RX ORDER — SIMVASTATIN 40 MG/1
40 TABLET, FILM COATED ORAL
Refills: 0 | Status: ACTIVE | COMMUNITY

## 2018-05-18 RX ORDER — UBIDECARENONE/VIT E ACET 100MG-5
25 MCG CAPSULE ORAL
Refills: 0 | Status: ACTIVE | COMMUNITY

## 2018-05-18 RX ORDER — ASPIRIN 81 MG
81 TABLET, DELAYED RELEASE (ENTERIC COATED) ORAL
Refills: 0 | Status: ACTIVE | COMMUNITY

## 2018-05-18 RX ORDER — PREDNISOLONE ACETATE 10 MG/ML
1 SUSPENSION/ DROPS OPHTHALMIC
Refills: 0 | Status: ACTIVE | COMMUNITY

## 2018-05-18 RX ORDER — TIZANIDINE HYDROCHLORIDE 2 MG/1
2 CAPSULE ORAL
Refills: 0 | Status: ACTIVE | COMMUNITY

## 2018-05-18 RX ORDER — OFLOXACIN 3 MG/ML
0.3 SOLUTION/ DROPS OPHTHALMIC
Refills: 0 | Status: ACTIVE | COMMUNITY

## 2018-05-18 RX ORDER — INSULIN GLARGINE 100 [IU]/ML
100 INJECTION, SOLUTION SUBCUTANEOUS
Refills: 0 | Status: ACTIVE | COMMUNITY

## 2018-10-12 ENCOUNTER — INPATIENT (INPATIENT)
Facility: HOSPITAL | Age: 58
LOS: 3 days | Discharge: NURSING, REHAB OR LONG-TERM CARE | DRG: 637 | End: 2018-10-16
Attending: INTERNAL MEDICINE | Admitting: INTERNAL MEDICINE
Payer: MEDICAID

## 2018-10-12 VITALS
DIASTOLIC BLOOD PRESSURE: 84 MMHG | HEART RATE: 84 BPM | SYSTOLIC BLOOD PRESSURE: 161 MMHG | OXYGEN SATURATION: 99 % | RESPIRATION RATE: 18 BRPM

## 2018-10-12 DIAGNOSIS — Z89.512 ACQUIRED ABSENCE OF LEFT LEG BELOW KNEE: Chronic | ICD-10-CM

## 2018-10-12 LAB
ALBUMIN SERPL ELPH-MCNC: 3.4 G/DL — LOW (ref 3.5–5)
ALP SERPL-CCNC: 116 U/L — SIGNIFICANT CHANGE UP (ref 40–120)
ALT FLD-CCNC: 30 U/L DA — SIGNIFICANT CHANGE UP (ref 10–60)
ANION GAP SERPL CALC-SCNC: 9 MMOL/L — SIGNIFICANT CHANGE UP (ref 5–17)
APPEARANCE UR: CLEAR — SIGNIFICANT CHANGE UP
AST SERPL-CCNC: 11 U/L — SIGNIFICANT CHANGE UP (ref 10–40)
BASOPHILS # BLD AUTO: 0.1 K/UL — SIGNIFICANT CHANGE UP (ref 0–0.2)
BASOPHILS NFR BLD AUTO: 1.8 % — SIGNIFICANT CHANGE UP (ref 0–2)
BILIRUB SERPL-MCNC: 0.5 MG/DL — SIGNIFICANT CHANGE UP (ref 0.2–1.2)
BILIRUB UR-MCNC: NEGATIVE — SIGNIFICANT CHANGE UP
BUN SERPL-MCNC: 69 MG/DL — HIGH (ref 7–18)
CALCIUM SERPL-MCNC: 7.8 MG/DL — LOW (ref 8.4–10.5)
CHLORIDE SERPL-SCNC: 106 MMOL/L — SIGNIFICANT CHANGE UP (ref 96–108)
CO2 SERPL-SCNC: 24 MMOL/L — SIGNIFICANT CHANGE UP (ref 22–31)
COLOR SPEC: YELLOW — SIGNIFICANT CHANGE UP
CREAT SERPL-MCNC: 7.62 MG/DL — HIGH (ref 0.5–1.3)
DIFF PNL FLD: ABNORMAL
EOSINOPHIL # BLD AUTO: 0.1 K/UL — SIGNIFICANT CHANGE UP (ref 0–0.5)
EOSINOPHIL NFR BLD AUTO: 3.9 % — SIGNIFICANT CHANGE UP (ref 0–6)
GLUCOSE SERPL-MCNC: 97 MG/DL — SIGNIFICANT CHANGE UP (ref 70–99)
GLUCOSE UR QL: 250
HCT VFR BLD CALC: 22.3 % — LOW (ref 39–50)
HGB BLD-MCNC: 7.1 G/DL — LOW (ref 13–17)
KETONES UR-MCNC: NEGATIVE — SIGNIFICANT CHANGE UP
LACTATE SERPL-SCNC: 1.4 MMOL/L — SIGNIFICANT CHANGE UP (ref 0.7–2)
LEUKOCYTE ESTERASE UR-ACNC: NEGATIVE — SIGNIFICANT CHANGE UP
LIDOCAIN IGE QN: 223 U/L — SIGNIFICANT CHANGE UP (ref 73–393)
LYMPHOCYTES # BLD AUTO: 0.7 K/UL — LOW (ref 1–3.3)
LYMPHOCYTES # BLD AUTO: 21 % — SIGNIFICANT CHANGE UP (ref 13–44)
MCHC RBC-ENTMCNC: 29.2 PG — SIGNIFICANT CHANGE UP (ref 27–34)
MCHC RBC-ENTMCNC: 31.7 GM/DL — LOW (ref 32–36)
MCV RBC AUTO: 92 FL — SIGNIFICANT CHANGE UP (ref 80–100)
MONOCYTES # BLD AUTO: 0.2 K/UL — SIGNIFICANT CHANGE UP (ref 0–0.9)
MONOCYTES NFR BLD AUTO: 7.2 % — SIGNIFICANT CHANGE UP (ref 2–14)
NEUTROPHILS # BLD AUTO: 2.3 K/UL — SIGNIFICANT CHANGE UP (ref 1.8–7.4)
NEUTROPHILS NFR BLD AUTO: 66 % — SIGNIFICANT CHANGE UP (ref 43–77)
NITRITE UR-MCNC: NEGATIVE — SIGNIFICANT CHANGE UP
PH UR: 6.5 — SIGNIFICANT CHANGE UP (ref 5–8)
PLATELET # BLD AUTO: 126 K/UL — LOW (ref 150–400)
POTASSIUM SERPL-MCNC: 3.8 MMOL/L — SIGNIFICANT CHANGE UP (ref 3.5–5.3)
POTASSIUM SERPL-SCNC: 3.8 MMOL/L — SIGNIFICANT CHANGE UP (ref 3.5–5.3)
PROT SERPL-MCNC: 6.8 G/DL — SIGNIFICANT CHANGE UP (ref 6–8.3)
PROT UR-MCNC: 100
RBC # BLD: 2.43 M/UL — LOW (ref 4.2–5.8)
RBC # FLD: 14.2 % — SIGNIFICANT CHANGE UP (ref 10.3–14.5)
SODIUM SERPL-SCNC: 139 MMOL/L — SIGNIFICANT CHANGE UP (ref 135–145)
SP GR SPEC: 1.01 — SIGNIFICANT CHANGE UP (ref 1.01–1.02)
TROPONIN I SERPL-MCNC: 0.03 NG/ML — SIGNIFICANT CHANGE UP (ref 0–0.04)
UROBILINOGEN FLD QL: NEGATIVE — SIGNIFICANT CHANGE UP
WBC # BLD: 3.4 K/UL — LOW (ref 3.8–10.5)
WBC # FLD AUTO: 3.4 K/UL — LOW (ref 3.8–10.5)

## 2018-10-12 PROCEDURE — 71045 X-RAY EXAM CHEST 1 VIEW: CPT | Mod: 26

## 2018-10-12 PROCEDURE — 99285 EMERGENCY DEPT VISIT HI MDM: CPT | Mod: 25

## 2018-10-12 PROCEDURE — 93010 ELECTROCARDIOGRAM REPORT: CPT

## 2018-10-12 RX ORDER — SODIUM CHLORIDE 9 MG/ML
1000 INJECTION INTRAMUSCULAR; INTRAVENOUS; SUBCUTANEOUS ONCE
Qty: 0 | Refills: 0 | Status: COMPLETED | OUTPATIENT
Start: 2018-10-12 | End: 2018-10-12

## 2018-10-12 RX ORDER — DEXTROSE 50 % IN WATER 50 %
100 SYRINGE (ML) INTRAVENOUS ONCE
Qty: 0 | Refills: 0 | Status: COMPLETED | OUTPATIENT
Start: 2018-10-12 | End: 2018-10-12

## 2018-10-12 RX ORDER — PIPERACILLIN AND TAZOBACTAM 4; .5 G/20ML; G/20ML
3.38 INJECTION, POWDER, LYOPHILIZED, FOR SOLUTION INTRAVENOUS ONCE
Qty: 0 | Refills: 0 | Status: COMPLETED | OUTPATIENT
Start: 2018-10-12 | End: 2018-10-12

## 2018-10-12 RX ADMIN — SODIUM CHLORIDE 4000 MILLILITER(S): 9 INJECTION INTRAMUSCULAR; INTRAVENOUS; SUBCUTANEOUS at 20:30

## 2018-10-12 RX ADMIN — PIPERACILLIN AND TAZOBACTAM 200 GRAM(S): 4; .5 INJECTION, POWDER, LYOPHILIZED, FOR SOLUTION INTRAVENOUS at 23:12

## 2018-10-12 RX ADMIN — Medication 100 MILLILITER(S): at 20:28

## 2018-10-12 RX ADMIN — SODIUM CHLORIDE 4000 MILLILITER(S): 9 INJECTION INTRAMUSCULAR; INTRAVENOUS; SUBCUTANEOUS at 21:57

## 2018-10-12 NOTE — ED PROVIDER NOTE - MEDICAL DECISION MAKING DETAILS
Iv, labs, ua, pan cx, fluids, dextrose, admit  screen for infection. insulin dosed appropriately, pt ate. unclear why pt has hypoglycemia but in setting of hypothermia possible infectious etiology

## 2018-10-12 NOTE — ED ADULT TRIAGE NOTE - CHIEF COMPLAINT QUOTE
sent from [facility] assisted living for hypoglycemia sugar was low ems gave 1 amp of d50 sugar went up to 250.pt awake alert

## 2018-10-12 NOTE — ED ADULT NURSE NOTE - CHIEF COMPLAINT QUOTE
sent from [facility] assisted living for hypoglycemia sugar was low ems gave 1 amp of d50 sugar went up to 250.pt awake alert

## 2018-10-12 NOTE — ED ADULT NURSE NOTE - OBJECTIVE STATEMENT
pt is here for hypoglycemia.  denied pain or chest pain, right BKA, denied N/V/D, c/o chills, pt calm at this time.

## 2018-10-12 NOTE — ED PROVIDER NOTE - PROGRESS NOTE DETAILS
cxr no obvious opacity. ua no infection. unclear source of hypothermia. will admit for further workup and management. rvp sent

## 2018-10-12 NOTE — ED PROVIDER NOTE - OBJECTIVE STATEMENT
59 y/o M patient with a significant PSHx of Adrenal insufficiency, Anemia, CKD, CAD, Glaucoma, HLD, HTN, Peripheral vascular disease and a significant PSHx of below left knee amputation sent from [facility] to the ED for low blood sugar. Patient is on Insulin for DM. Patient is on regular insulin x2 a day pre meal and Lantus x8 units once a day. Patient states he last used Lantus yesterday, sent in to ED for low blood sugar. Patient denies any other complaints. NKDA.

## 2018-10-12 NOTE — ED PROVIDER NOTE - PMH
Adrenal insufficiency    Anemia    CKD (chronic kidney disease)    Coronary artery disease    Glaucoma    HLD (hyperlipidemia)    Hypertension    Peripheral vascular disease

## 2018-10-12 NOTE — ED ADULT NURSE REASSESSMENT NOTE - NS ED NURSE REASSESS COMMENT FT1
received pt awake alert oriented x 3not in distress,with saline lock intact,no redness no swelling noted.

## 2018-10-13 DIAGNOSIS — I73.9 PERIPHERAL VASCULAR DISEASE, UNSPECIFIED: ICD-10-CM

## 2018-10-13 DIAGNOSIS — I10 ESSENTIAL (PRIMARY) HYPERTENSION: ICD-10-CM

## 2018-10-13 DIAGNOSIS — E16.2 HYPOGLYCEMIA, UNSPECIFIED: ICD-10-CM

## 2018-10-13 DIAGNOSIS — N18.9 CHRONIC KIDNEY DISEASE, UNSPECIFIED: ICD-10-CM

## 2018-10-13 DIAGNOSIS — D64.9 ANEMIA, UNSPECIFIED: ICD-10-CM

## 2018-10-13 DIAGNOSIS — E27.40 UNSPECIFIED ADRENOCORTICAL INSUFFICIENCY: ICD-10-CM

## 2018-10-13 DIAGNOSIS — Z29.9 ENCOUNTER FOR PROPHYLACTIC MEASURES, UNSPECIFIED: ICD-10-CM

## 2018-10-13 DIAGNOSIS — E78.5 HYPERLIPIDEMIA, UNSPECIFIED: ICD-10-CM

## 2018-10-13 PROBLEM — H40.9 UNSPECIFIED GLAUCOMA: Chronic | Status: ACTIVE | Noted: 2018-01-18

## 2018-10-13 PROBLEM — I25.10 ATHEROSCLEROTIC HEART DISEASE OF NATIVE CORONARY ARTERY WITHOUT ANGINA PECTORIS: Chronic | Status: ACTIVE | Noted: 2018-01-18

## 2018-10-13 LAB
GLUCOSE BLDC GLUCOMTR-MCNC: 170 MG/DL — HIGH (ref 70–99)
GLUCOSE BLDC GLUCOMTR-MCNC: 174 MG/DL — HIGH (ref 70–99)
GLUCOSE BLDC GLUCOMTR-MCNC: 205 MG/DL — HIGH (ref 70–99)
GLUCOSE BLDC GLUCOMTR-MCNC: 207 MG/DL — HIGH (ref 70–99)
HCT VFR BLD CALC: 21 % — CRITICAL LOW (ref 39–50)
HGB BLD-MCNC: 6.9 G/DL — CRITICAL LOW (ref 13–17)
MCHC RBC-ENTMCNC: 29.4 PG — SIGNIFICANT CHANGE UP (ref 27–34)
MCHC RBC-ENTMCNC: 32.8 GM/DL — SIGNIFICANT CHANGE UP (ref 32–36)
MCV RBC AUTO: 89.6 FL — SIGNIFICANT CHANGE UP (ref 80–100)
PLATELET # BLD AUTO: 127 K/UL — LOW (ref 150–400)
RAPID RVP RESULT: SIGNIFICANT CHANGE UP
RBC # BLD: 2.34 M/UL — LOW (ref 4.2–5.8)
RBC # FLD: 14.3 % — SIGNIFICANT CHANGE UP (ref 10.3–14.5)
WBC # BLD: 3.9 K/UL — SIGNIFICANT CHANGE UP (ref 3.8–10.5)
WBC # FLD AUTO: 3.9 K/UL — SIGNIFICANT CHANGE UP (ref 3.8–10.5)

## 2018-10-13 RX ORDER — SIMVASTATIN 20 MG/1
40 TABLET, FILM COATED ORAL AT BEDTIME
Qty: 0 | Refills: 0 | Status: DISCONTINUED | OUTPATIENT
Start: 2018-10-13 | End: 2018-10-16

## 2018-10-13 RX ORDER — ERYTHROPOIETIN 10000 [IU]/ML
10000 INJECTION, SOLUTION INTRAVENOUS; SUBCUTANEOUS
Qty: 0 | Refills: 0 | Status: DISCONTINUED | OUTPATIENT
Start: 2018-10-13 | End: 2018-10-15

## 2018-10-13 RX ORDER — FERROUS SULFATE 325(65) MG
325 TABLET ORAL DAILY
Qty: 0 | Refills: 0 | Status: DISCONTINUED | OUTPATIENT
Start: 2018-10-13 | End: 2018-10-16

## 2018-10-13 RX ORDER — PREGABALIN 225 MG/1
1000 CAPSULE ORAL DAILY
Qty: 0 | Refills: 0 | Status: DISCONTINUED | OUTPATIENT
Start: 2018-10-13 | End: 2018-10-16

## 2018-10-13 RX ORDER — TIZANIDINE 4 MG/1
2 TABLET ORAL
Qty: 0 | Refills: 0 | COMMUNITY

## 2018-10-13 RX ORDER — ASPIRIN/CALCIUM CARB/MAGNESIUM 324 MG
81 TABLET ORAL DAILY
Qty: 0 | Refills: 0 | Status: DISCONTINUED | OUTPATIENT
Start: 2018-10-13 | End: 2018-10-16

## 2018-10-13 RX ORDER — DIPHENHYDRAMINE HCL 50 MG
25 CAPSULE ORAL EVERY 4 HOURS
Qty: 0 | Refills: 0 | Status: DISCONTINUED | OUTPATIENT
Start: 2018-10-13 | End: 2018-10-16

## 2018-10-13 RX ORDER — FLUDROCORTISONE ACETATE 0.1 MG/1
0.1 TABLET ORAL
Qty: 0 | Refills: 0 | Status: DISCONTINUED | OUTPATIENT
Start: 2018-10-13 | End: 2018-10-16

## 2018-10-13 RX ORDER — OFLOXACIN 0.3 %
1 DROPS OPHTHALMIC (EYE)
Qty: 0 | Refills: 0 | COMMUNITY

## 2018-10-13 RX ORDER — INSULIN GLARGINE 100 [IU]/ML
4 INJECTION, SOLUTION SUBCUTANEOUS AT BEDTIME
Qty: 0 | Refills: 0 | Status: DISCONTINUED | OUTPATIENT
Start: 2018-10-13 | End: 2018-10-16

## 2018-10-13 RX ORDER — NIFEDIPINE 30 MG
30 TABLET, EXTENDED RELEASE 24 HR ORAL DAILY
Qty: 0 | Refills: 0 | Status: DISCONTINUED | OUTPATIENT
Start: 2018-10-13 | End: 2018-10-13

## 2018-10-13 RX ORDER — HEPARIN SODIUM 5000 [USP'U]/ML
5000 INJECTION INTRAVENOUS; SUBCUTANEOUS EVERY 8 HOURS
Qty: 0 | Refills: 0 | Status: DISCONTINUED | OUTPATIENT
Start: 2018-10-13 | End: 2018-10-16

## 2018-10-13 RX ORDER — CHOLECALCIFEROL (VITAMIN D3) 125 MCG
1000 CAPSULE ORAL DAILY
Qty: 0 | Refills: 0 | Status: DISCONTINUED | OUTPATIENT
Start: 2018-10-13 | End: 2018-10-16

## 2018-10-13 RX ORDER — PREDNISOLONE SODIUM PHOSPHATE 1 %
1 DROPS OPHTHALMIC (EYE)
Qty: 0 | Refills: 0 | COMMUNITY

## 2018-10-13 RX ORDER — INSULIN LISPRO 100/ML
VIAL (ML) SUBCUTANEOUS
Qty: 0 | Refills: 0 | Status: DISCONTINUED | OUTPATIENT
Start: 2018-10-13 | End: 2018-10-16

## 2018-10-13 RX ORDER — NIFEDIPINE 30 MG
30 TABLET, EXTENDED RELEASE 24 HR ORAL DAILY
Qty: 0 | Refills: 0 | Status: DISCONTINUED | OUTPATIENT
Start: 2018-10-13 | End: 2018-10-16

## 2018-10-13 RX ORDER — SODIUM BICARBONATE 1 MEQ/ML
650 SYRINGE (ML) INTRAVENOUS THREE TIMES A DAY
Qty: 0 | Refills: 0 | Status: DISCONTINUED | OUTPATIENT
Start: 2018-10-13 | End: 2018-10-15

## 2018-10-13 RX ADMIN — Medication 1000 UNIT(S): at 17:15

## 2018-10-13 RX ADMIN — INSULIN GLARGINE 4 UNIT(S): 100 INJECTION, SOLUTION SUBCUTANEOUS at 22:21

## 2018-10-13 RX ADMIN — Medication 81 MILLIGRAM(S): at 17:14

## 2018-10-13 RX ADMIN — SIMVASTATIN 40 MILLIGRAM(S): 20 TABLET, FILM COATED ORAL at 22:20

## 2018-10-13 RX ADMIN — Medication 325 MILLIGRAM(S): at 22:44

## 2018-10-13 RX ADMIN — Medication 650 MILLIGRAM(S): at 22:44

## 2018-10-13 RX ADMIN — PREGABALIN 1000 MICROGRAM(S): 225 CAPSULE ORAL at 17:15

## 2018-10-13 RX ADMIN — Medication 1: at 17:13

## 2018-10-13 RX ADMIN — Medication 30 MILLIGRAM(S): at 09:05

## 2018-10-13 RX ADMIN — FLUDROCORTISONE ACETATE 0.1 MILLIGRAM(S): 0.1 TABLET ORAL at 17:19

## 2018-10-13 RX ADMIN — Medication 2: at 11:47

## 2018-10-13 NOTE — CONSULT NOTE ADULT - SUBJECTIVE AND OBJECTIVE BOX
HPI:  HPI: 59 y/o Male from [facility] assisted living with PMHx of  Adrenal insufficiency, Anemia, CKD stage 5, CAD, Glaucoma, HLD, HTN, Peripheral vascular disease and a significant PSHx of below left knee amputation sent for low blood sugar. Upon arrival Blood sugar were found to be 135. Patient uses insulin at home  Patient states he last used Lantus yesterday. Denies nausea, vomiting, diarrhea, abdominal pain, SOB, chest pain, EDGE, palpitations, dizziness, headache, cough, wheezing, joint pain or swelling, fever, chills.  NKDA.   Scheduled for AVF creation as outpatient.    PMH:   Peripheral vascular disease  HLD (hyperlipidemia)  Coronary artery disease  Glaucoma  Anemia  CKD (chronic kidney disease)  Adrenal insufficiency  Hypertension    PSH:   Below knee amputation status, left    FAMILY HISTORY:    Social History:  non-smoker/ non-alcoholic     Home Meds:  MEDICATIONS  (STANDING):  aspirin  chewable 81 milliGRAM(s) Oral daily  cholecalciferol 1000 Unit(s) Oral daily  cyanocobalamin 1000 MICROGram(s) Oral daily  fludroCORTISONE 0.1 milliGRAM(s) Oral two times a day  heparin  Injectable 5000 Unit(s) SubCutaneous every 8 hours  insulin glargine Injectable (LANTUS) 4 Unit(s) SubCutaneous at bedtime  insulin lispro (HumaLOG) corrective regimen sliding scale   SubCutaneous Before meals and at bedtime  NIFEdipine XL 30 milliGRAM(s) Oral daily  simvastatin 40 milliGRAM(s) Oral at bedtime  sodium bicarbonate 650 milliGRAM(s) Oral three times a day    MEDICATIONS  (PRN):  diphenhydrAMINE 25 milliGRAM(s) Oral every 4 hours PRN Rash and/or Itching    Allergies:  fish (Rash)  liver (Anaphylaxis)    REVIEW OF SYSTEMS:    CONSTITUTIONAL: No fever, or chills  EYES: No eye pain, visual disturbances, or discharge  ENMT: No throat pain  NECK: No pain or stiffness  RESPIRATORY: No cough or SOB  CARDIOVASCULAR: No chest pain, palpitations, dizziness, or leg swelling  GASTROINTESTINAL: No abdominal or epigastric pain. No nausea, vomiting, or diarrhea  GENITOURINARY: No dysuria, frequency, hematuria, or incontinence  NEUROLOGICAL: No headaches  SKIN: No itching, burning, rashes    Vital Signs Last 24 Hrs  T(C): 36.7 (13 Oct 2018 16:11), Max: 37.3 (13 Oct 2018 07:40)  T(F): 98.1 (13 Oct 2018 16:11), Max: 99.1 (13 Oct 2018 07:40)  HR: 102 (13 Oct 2018 16:11) (80 - 102)  BP: 151/81 (13 Oct 2018 16:11) (151/81 - 189/101)  BP(mean): --  RR: 18 (13 Oct 2018 16:11) (18 - 19)  SpO2: 99% (13 Oct 2018 16:11) (98% - 100%)    10-13 @ 07:01  -  10-13 @ 17:15  --------------------------------------------------------  IN: 0 mL / OUT: 250 mL / NET: -250 mL    PHYSICAL EXAM:    GENERAL: NAD, well-developed  HEAD:  Atraumatic, Normocephalic  EYES: Sclera anicteric  ENMT: Moist mucous membranes  NECK: Supple, No JVD  NERVOUS SYSTEM:  Alert & Oriented X3,  CHEST/LUNG: Clear to auscultation  HEART: Regular rate and rhythm; No murmurs  ABDOMEN: Soft, Nontender, Nondistended; Bowel sounds present  EXTREMITIES:  No edema  SKIN: Normal turgor    LABS:                        6.9    3.9   )-----------( 127      ( 13 Oct 2018 16:34 )             21.0     10    139  |  106  |  69<H>  ----------------------------<  97  3.8   |  24  |  7.62<H>    Ca    7.8<L>      12 Oct 2018 20:46    TPro  6.8  /  Alb  3.4<L>  /  TBili  0.5  /  DBili  x   /  AST  11  /  ALT  30  /  AlkPhos  116  10    Urinalysis Basic - ( 12 Oct 2018 22:31 )    Color: Yellow / Appearance: Clear / S.010 / pH: x  Gluc: x / Ketone: Negative  / Bili: Negative / Urobili: Negative   Blood: x / Protein: 100 / Nitrite: Negative   Leuk Esterase: Negative / RBC: 2-5 /HPF / WBC 0-2 /HPF   Sq Epi: x / Non Sq Epi: Few /HPF / Bacteria: Trace /HPF    ASSESSMENT AND PLAN:  1. 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 There is no indication for emergent HD  2. Anemia due to CKD  3. Hypertension is controlled  4. MBD  Start Epogen  Keep patient euvolemic and renal diet  Vascular surgeon evaluation re AVF creation  Avoid Nephrotoxic Meds/ Agents such as NSAIDs, Gadolinium contrast, Phosphate containing enemas, etc..)  Adjust Medications according to eGFR  Obtain TSAT, PO4 and PTH  Follow BMP  Many thanks

## 2018-10-13 NOTE — ED ADULT NURSE REASSESSMENT NOTE - NS ED NURSE REASSESS COMMENT FT1
pt awake alert not in distress,pt refused am meds and am labs.myrna montoya textpaged. pt awake alert not in distress,pt refused am meds and am labs.myrna montoya textpaged.and dr ariza textpaged.

## 2018-10-13 NOTE — H&P ADULT - NSHPPHYSICALEXAM_GEN_ALL_CORE
General - NAD, sitting up in bed, well groomed  Eyes - PERRLA, EOM intact  ENT - Nonicteric sclerae, PERRLA, EOMI. Oropharynx clear. Moist mucous membranes. Conjunctivae appear well perfused.   Neck - No noticeable or palpable swelling, redness or rash around throat or on face  Lymph Nodes - No lymphadenopathy  Cardiovascular - RRR no m/r/g, no JVD, no carotid bruits  Lungs - Clear to ascultation, no use of accessory muscles, no crackles or wheezes.  Skin - No rashes, skin warm and dry, no erythematous areas  Abdomen - Normal bowel sounds, abdomen soft and nontender  Extremities - No edema, cyanosis or clubbing Left Below the knee amputation  Neurological – Alert and oriented x 3, CN 2-12 grossly intact.

## 2018-10-13 NOTE — H&P ADULT - ASSESSMENT
HPI:    ED course: Patient examined at bedside  VS WNL except for   Physical exam as above with pertinent findings of   Labs significant for  Imaging significant for    Patient will be admitted to the floor for HPI: 57 [facility]  Adrenal insufficiency, Anemia, CKD, CAD, Glaucoma, HLD, HTN, Peripheral vascular disease and a significant PSHx of below left knee amputation sent for low blood sugar. Upon arrival Blood sugar were found to be 135. Patient uses insulin at home  Patient states he last used Lantus yesterday. Denies nausea, vomiting, diarrhea, abdominal pain, SOB, chest pain, EDGE, palpitations, dizziness, headache, cough, wheezing, joint pain or swelling, fever, chills.  NKDA.    ED course: Patient examined at bedside NAD  VS WNL   Physical exam as above  Labs significant for: NORMAL BLOOD SUGARS. U/A negative for any infection   Imaging significant for Chest X-ray Negative for any infiltrates    Patient will be admitted to the floor for Hypoglycemia

## 2018-10-13 NOTE — PROGRESS NOTE ADULT - SUBJECTIVE AND OBJECTIVE BOX
Patient is a 58y old  Male who presents with a chief complaint of Hypoglycemia (13 Oct 2018 17:09)    PATIENT IS SEEN AND EXAMINED IN MEDICAL FLOOR.    ALLERGIES:  fish (Rash)  liver (Anaphylaxis)  No Known Drug Allergies    VITALS:    Vital Signs Last 24 Hrs  T(C): 36.7 (13 Oct 2018 16:11), Max: 37.3 (13 Oct 2018 07:40)  T(F): 98.1 (13 Oct 2018 16:11), Max: 99.1 (13 Oct 2018 07:40)  HR: 102 (13 Oct 2018 16:11) (80 - 102)  BP: 151/81 (13 Oct 2018 16:11) (151/81 - 189/101)  BP(mean): --  RR: 18 (13 Oct 2018 16:11) (18 - 19)  SpO2: 99% (13 Oct 2018 16:11) (98% - 100%)    LABS:  CBC Full  -  ( 13 Oct 2018 16:34 )  WBC Count : 3.9 K/uL  Hemoglobin : 6.9 g/dL  Hematocrit : 21.0 %  Platelet Count - Automated : 127 K/uL  Mean Cell Volume : 89.6 fl  Mean Cell Hemoglobin : 29.4 pg  Mean Cell Hemoglobin Concentration : 32.8 gm/dL  Auto Neutrophil # : x  Auto Lymphocyte # : x  Auto Monocyte # : x  Auto Eosinophil # : x  Auto Basophil # : x  Auto Neutrophil % : x  Auto Lymphocyte % : x  Auto Monocyte % : x  Auto Eosinophil % : x  Auto Basophil % : x      10-12    139  |  106  |  69<H>  ----------------------------<  97  3.8   |  24  |  7.62<H>    Ca    7.8<L>      12 Oct 2018 20:46    TPro  6.8  /  Alb  3.4<L>  /  TBili  0.5  /  DBili  x   /  AST  11  /  ALT  30  /  AlkPhos  116  10-12    CAPILLARY BLOOD GLUCOSE      POCT Blood Glucose.: 174 mg/dL (13 Oct 2018 16:52)  POCT Blood Glucose.: 207 mg/dL (13 Oct 2018 11:23)  POCT Blood Glucose.: 170 mg/dL (13 Oct 2018 01:31)  POCT Blood Glucose.: 272 mg/dL (12 Oct 2018 22:01)  POCT Blood Glucose.: 135 mg/dL (12 Oct 2018 19:47)    CARDIAC MARKERS ( 12 Oct 2018 20:46 )  0.034 ng/mL / x     / x     / x     / x          LIVER FUNCTIONS - ( 12 Oct 2018 20:46 )  Alb: 3.4 g/dL / Pro: 6.8 g/dL / ALK PHOS: 116 U/L / ALT: 30 U/L DA / AST: 11 U/L / GGT: x             MEDICATIONS:    MEDICATIONS  (STANDING):  aspirin  chewable 81 milliGRAM(s) Oral daily  cholecalciferol 1000 Unit(s) Oral daily  cyanocobalamin 1000 MICROGram(s) Oral daily  epoetin beverley Injectable 08386 Unit(s) SubCutaneous <User Schedule>  ferrous    sulfate 325 milliGRAM(s) Oral daily  fludroCORTISONE 0.1 milliGRAM(s) Oral two times a day  heparin  Injectable 5000 Unit(s) SubCutaneous every 8 hours  insulin glargine Injectable (LANTUS) 4 Unit(s) SubCutaneous at bedtime  insulin lispro (HumaLOG) corrective regimen sliding scale   SubCutaneous Before meals and at bedtime  NIFEdipine XL 30 milliGRAM(s) Oral daily  simvastatin 40 milliGRAM(s) Oral at bedtime  sodium bicarbonate 650 milliGRAM(s) Oral three times a day      MEDICATIONS  (PRN):  diphenhydrAMINE 25 milliGRAM(s) Oral every 4 hours PRN Rash and/or Itching      REVIEW OF SYSTEMS:                           ALL ROS DONE [ X   ]    CONSTITUTIONAL:  LETHARGIC [   ], FEVER [   ], UNRESPONSIVE [   ]  CVS:  CP  [   ], SOB, [   ], PALPITATIONS [   ], DIZZYNESS [   ]  RS: COUGH [   ], SPUTUM [   ]  GI: ABDOMINAL PAIN [   ], NAUSEA [   ], VOMITINGS [   ], DIARRHEA [   ], CONSTIPATION [   ]  :  DYSURIA [   ], NOCTURIA [   ], INCREASED FREQUENCY [   ], DRIBLING [   ],  SKELETAL: PAINFUL JOINTS [   ], SWOLLEN JOINTS [   ], NECK ACHE [   ], LOW BACK ACHE [   ],  SKIN : ULCERS [   ], RASH [   ], ITCHING [   ]  CNS: HEAD ACHE [   ], DOUBLE VISION [   ], BLURRED VISION [   ], AMS / CONFUSION [   ], SEIZURES [   ], WEAKNESS [   ],TINGLING / NUMBNESS [   ]    PHYSICAL EXAMINATION:  GENERAL APPEARANCE: NO DISTRESS  HEENT:  NO PALLOR, NO  JVD,  NO   NODES, NECK SUPPLE  CVS: S1 +, S2 +,   RS: AEEB,  OCCASIONAL  RALES +,   NO RONCHI  ABD: SOFT, NT, NO, BS +  EXT: NO PE , LEFT BKA +  SKIN: WARM,   SKELETAL:  ROM ACCEPTABLE  CNS:  AAO X 2-3 , NO DEFICITS , WASTING OF SMALL MUSCLES OF B/L HANDS     RADIOLOGY :    < from: Xray Chest 1 View-PORTABLE IMMEDIATE (10.12.18 @ 22:39) >  IMPRESSION: No infiltrate.    < end of copied text >        ASSESSMENT :     Hypoglycemia  Peripheral vascular disease  HLD (hyperlipidemia)  Coronary artery disease  Glaucoma  Anemia  CKD (chronic kidney disease)  Adrenal insufficiency  Hypertension    Below knee amputation status, left      PLAN:  HPI:  HPI: 59 y/o Male [facility] woth PMHx of  Adrenal insufficiency, Anemia, CKD, CAD, Glaucoma, HLD, HTN, Peripheral vascular disease and a significant PSHx of below left knee amputation sent for low blood sugar. Upon arrival Blood sugar were found to be 135. Patient uses insulin at home  Patient states he last used Lantus yesterday. Denies nausea, vomiting, diarrhea, abdominal pain, SOB, chest pain, EDGE, palpitations, dizziness, headache, cough, wheezing, joint pain or swelling, fever, chills.  NKDA. (13 Oct 2018 02:10)    - AMS , METABOLIC ENCEPHALOPATHY, HYPOGLYCEMIA - REDUCED INJ. LANTUS, MONITOR FINGER STICKS  - CKD / ESRD . S/P RENAL CONSULT EVALUATION. NO NEED FOR HD AT PRESENT  - ANEMIA - SUSPECT ANEMIA OF CKD. F/UP ANEMIA W/UP. ADDED INJ. EPOGEN AND FESO4. TRANSFUSE AS NEEDED. F/UP FECAL OCCULT BLOOD  - VASCULAR CONSULT DR. JEFFERSON TO EVALUATE FOR AVF INSERTION ON THIS ADMISSION  - GI AND DVT PROPHYLAXIS  - DR. MAC

## 2018-10-13 NOTE — H&P ADULT - PROBLEM SELECTOR PLAN 1
NORMAL BLOOD SUGARS. U/A negative for any infection   Imaging significant for Chest X-ray Negative for any infiltrates  Likely secondary to Insulin Management   Management: NORMAL BLOOD SUGARS. U/A negative for any infection   Imaging significant for Chest X-ray Negative for any infiltrates  Likely secondary to Insulin Management   Management:  Reduce Lantus from 8units to 4units and HSS

## 2018-10-13 NOTE — H&P ADULT - HISTORY OF PRESENT ILLNESS
HPI: 57 y/o Male from [facility]ed living woth PMHx of  Adrenal insufficiency, Anemia, CKD, CAD, Glaucoma, HLD, HTN, Peripheral vascular disease and a significant PSHx of below left knee amputation sent for low blood sugar. Upon arrival Blood sugar were found to be 135. Patient uses insulin at home  Patient states he last used Lantus yesterday. Denies nausea, vomiting, diarrhea, abdominal pain, SOB, chest pain, EDGE, palpitations, dizziness, headache, cough, wheezing, joint pain or swelling, fever, chills.  NKDA.

## 2018-10-14 LAB
GLUCOSE BLDC GLUCOMTR-MCNC: 162 MG/DL — HIGH (ref 70–99)
GLUCOSE BLDC GLUCOMTR-MCNC: 206 MG/DL — HIGH (ref 70–99)
GLUCOSE BLDC GLUCOMTR-MCNC: 239 MG/DL — HIGH (ref 70–99)
GLUCOSE BLDC GLUCOMTR-MCNC: 97 MG/DL — SIGNIFICANT CHANGE UP (ref 70–99)
HCT VFR BLD CALC: 23 % — LOW (ref 39–50)
HGB BLD-MCNC: 7.2 G/DL — LOW (ref 13–17)
MCHC RBC-ENTMCNC: 28.8 PG — SIGNIFICANT CHANGE UP (ref 27–34)
MCHC RBC-ENTMCNC: 31.3 GM/DL — LOW (ref 32–36)
MCV RBC AUTO: 91.8 FL — SIGNIFICANT CHANGE UP (ref 80–100)
PLATELET # BLD AUTO: 137 K/UL — LOW (ref 150–400)
RBC # BLD: 2.5 M/UL — LOW (ref 4.2–5.8)
RBC # FLD: 14.2 % — SIGNIFICANT CHANGE UP (ref 10.3–14.5)
WBC # BLD: 3.7 K/UL — LOW (ref 3.8–10.5)
WBC # FLD AUTO: 3.7 K/UL — LOW (ref 3.8–10.5)

## 2018-10-14 RX ADMIN — Medication 2: at 12:04

## 2018-10-14 RX ADMIN — Medication 1: at 21:36

## 2018-10-14 RX ADMIN — SIMVASTATIN 40 MILLIGRAM(S): 20 TABLET, FILM COATED ORAL at 21:36

## 2018-10-14 RX ADMIN — Medication 81 MILLIGRAM(S): at 12:00

## 2018-10-14 RX ADMIN — FLUDROCORTISONE ACETATE 0.1 MILLIGRAM(S): 0.1 TABLET ORAL at 17:50

## 2018-10-14 RX ADMIN — Medication 325 MILLIGRAM(S): at 12:00

## 2018-10-14 RX ADMIN — Medication 650 MILLIGRAM(S): at 06:48

## 2018-10-14 RX ADMIN — PREGABALIN 1000 MICROGRAM(S): 225 CAPSULE ORAL at 12:00

## 2018-10-14 RX ADMIN — Medication 30 MILLIGRAM(S): at 06:48

## 2018-10-14 RX ADMIN — INSULIN GLARGINE 4 UNIT(S): 100 INJECTION, SOLUTION SUBCUTANEOUS at 21:36

## 2018-10-14 RX ADMIN — Medication 2: at 17:50

## 2018-10-14 RX ADMIN — Medication 1000 UNIT(S): at 12:00

## 2018-10-14 RX ADMIN — Medication 650 MILLIGRAM(S): at 21:36

## 2018-10-14 RX ADMIN — Medication 650 MILLIGRAM(S): at 14:20

## 2018-10-14 RX ADMIN — FLUDROCORTISONE ACETATE 0.1 MILLIGRAM(S): 0.1 TABLET ORAL at 06:48

## 2018-10-14 NOTE — PROGRESS NOTE ADULT - SUBJECTIVE AND OBJECTIVE BOX
CC: Patient denies SOB, n/v/d, fever or chills.    Vital Signs Last 24 Hrs  T(C): 37.2 (14 Oct 2018 16:13), Max: 37.2 (14 Oct 2018 16:13)  T(F): 99 (14 Oct 2018 16:13), Max: 99 (14 Oct 2018 16:13)  HR: 98 (14 Oct 2018 16:13) (90 - 98)  BP: 132/73 (14 Oct 2018 16:13) (132/73 - 174/90)  BP(mean): --  RR: 18 (14 Oct 2018 16:13) (18 - 18)  SpO2: 100% (14 Oct 2018 16:13) (99% - 100%)    10-13 @ 07:01  -  10-14 @ 07:00  --------------------------------------------------------  IN: 0 mL / OUT: 250 mL / NET: -250 mL    PHYSICAL EXAM:  GENERAL: NAD, well-developed  HEAD:  Atraumatic, Normocephalic  EYES: Sclera anicteric  ENMT: Moist mucous membranes  NECK: Supple, No JVD  NERVOUS SYSTEM:  Alert & Oriented X3,  CHEST/LUNG: Clear to auscultation  HEART: Regular rate and rhythm; No murmurs  ABDOMEN: Soft, Nontender, Nondistended; Bowel sounds present  EXTREMITIES:  No edema  SKIN: Normal turgor    MEDICATIONS:  aspirin  chewable 81 milliGRAM(s) Oral daily  cholecalciferol 1000 Unit(s) Oral daily  cyanocobalamin 1000 MICROGram(s) Oral daily  diphenhydrAMINE 25 milliGRAM(s) Oral every 4 hours PRN  epoetin beverley Injectable 46580 Unit(s) SubCutaneous <User Schedule>  ferrous    sulfate 325 milliGRAM(s) Oral daily  fludroCORTISONE 0.1 milliGRAM(s) Oral two times a day  heparin  Injectable 5000 Unit(s) SubCutaneous every 8 hours  insulin glargine Injectable (LANTUS) 4 Unit(s) SubCutaneous at bedtime  insulin lispro (HumaLOG) corrective regimen sliding scale   SubCutaneous Before meals and at bedtime  NIFEdipine XL 30 milliGRAM(s) Oral daily  simvastatin 40 milliGRAM(s) Oral at bedtime  sodium bicarbonate 650 milliGRAM(s) Oral three times a day    LABS:                        6.9    3.9   )-----------( 127      ( 13 Oct 2018 16:34 )             21.0     10    139  |  106  |  69<H>  ----------------------------<  97  3.8   |  24  |  7.62<H>    Ca    7.8<L>      12 Oct 2018 20:46    TPro  6.8  /  Alb  3.4<L>  /  TBili  0.5  /  DBili  x   /  AST  11  /  ALT  30  /  AlkPhos  116  10-12      Urinalysis Basic - ( 12 Oct 2018 22:31 )    Color: Yellow / Appearance: Clear / S.010 / pH: x  Gluc: x / Ketone: Negative  / Bili: Negative / Urobili: Negative   Blood: x / Protein: 100 / Nitrite: Negative   Leuk Esterase: Negative / RBC: 2-5 /HPF / WBC 0-2 /HPF   Sq Epi: x / Non Sq Epi: Few /HPF / Bacteria: Trace /HPF    ASSESSMENT AND PLAN:   1. 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 There is no indication for emergent HD  2. Anemia due to CKD  3. Hypertension is controlled  4. MBD  Cont Epogen  Keep patient euvolemic and renal diet  Vascular surgeon evaluation re AVF creation  Avoid Nephrotoxic Meds/ Agents such as NSAIDs, Gadolinium contrast, Phosphate containing enemas, etc..)  Adjust Medications according to eGFR  Obtain TSAT, PO4 and PTH  Follow BMP, H/H

## 2018-10-14 NOTE — PROGRESS NOTE ADULT - PROBLEM SELECTOR PLAN 1
NORMAL BLOOD SUGARS. U/A negative for any infection   Imaging significant for Chest X-ray Negative for any infiltrates  Likely secondary to Insulin Management   Management:  Reduce Lantus from 8units to 4units and HSS

## 2018-10-14 NOTE — PROGRESS NOTE ADULT - SUBJECTIVE AND OBJECTIVE BOX
PGY 1 Note discussed with supervising resident and primary attending    Patient is a 58y old  Male who presents with a chief complaint of Hypoglycemia (13 Oct 2018 18:57)      INTERVAL HPI/OVERNIGHT EVENTS: offers no new complaints  MEDICATIONS  (STANDING):  aspirin  chewable 81 milliGRAM(s) Oral daily  cholecalciferol 1000 Unit(s) Oral daily  cyanocobalamin 1000 MICROGram(s) Oral daily  epoetin beverley Injectable 04103 Unit(s) SubCutaneous <User Schedule>  ferrous    sulfate 325 milliGRAM(s) Oral daily  fludroCORTISONE 0.1 milliGRAM(s) Oral two times a day  heparin  Injectable 5000 Unit(s) SubCutaneous every 8 hours  insulin glargine Injectable (LANTUS) 4 Unit(s) SubCutaneous at bedtime  insulin lispro (HumaLOG) corrective regimen sliding scale   SubCutaneous Before meals and at bedtime  NIFEdipine XL 30 milliGRAM(s) Oral daily  simvastatin 40 milliGRAM(s) Oral at bedtime  sodium bicarbonate 650 milliGRAM(s) Oral three times a day    MEDICATIONS  (PRN):  diphenhydrAMINE 25 milliGRAM(s) Oral every 4 hours PRN Rash and/or Itching      __________________________________________________  REVIEW OF SYSTEMS:    Unable to take it pt refused "leave me alone"       Vital Signs Last 24 Hrs  T(C): 37.1 (13 Oct 2018 23:35), Max: 37.1 (13 Oct 2018 23:35)  T(F): 98.8 (13 Oct 2018 23:35), Max: 98.8 (13 Oct 2018 23:35)  HR: 90 (14 Oct 2018 05:40) (90 - 102)  BP: 174/90 (14 Oct 2018 05:40) (136/75 - 174/90)  BP(mean): --  RR: 18 (13 Oct 2018 23:35) (18 - 19)  SpO2: 99% (13 Oct 2018 23:35) (99% - 99%)    ________________________________________________  PHYSICAL EXAM:  Pt refused  _________________________________________________  LABS:                        6.9    3.9   )-----------( 127      ( 13 Oct 2018 16:34 )             21.0     10    139  |  106  |  69<H>  ----------------------------<  97  3.8   |  24  |  7.62<H>    Ca    7.8<L>      12 Oct 2018 20:46    TPro  6.8  /  Alb  3.4<L>  /  TBili  0.5  /  DBili  x   /  AST  11  /  ALT  30  /  AlkPhos  116  10-12      Urinalysis Basic - ( 12 Oct 2018 22:31 )    Color: Yellow / Appearance: Clear / S.010 / pH: x  Gluc: x / Ketone: Negative  / Bili: Negative / Urobili: Negative   Blood: x / Protein: 100 / Nitrite: Negative   Leuk Esterase: Negative / RBC: 2-5 /HPF / WBC 0-2 /HPF   Sq Epi: x / Non Sq Epi: Few /HPF / Bacteria: Trace /HPF      CAPILLARY BLOOD GLUCOSE      POCT Blood Glucose.: 97 mg/dL (14 Oct 2018 08:34)  POCT Blood Glucose.: 205 mg/dL (13 Oct 2018 21:21)  POCT Blood Glucose.: 174 mg/dL (13 Oct 2018 16:52)  POCT Blood Glucose.: 207 mg/dL (13 Oct 2018 11:23)        Consultant(s) Notes Reviewed:   YES/ No    Care Discussed with Consultants :     Plan of care was discussed with patient and /or primary care giver; all questions and concerns were addressed and care was aligned with patient's wishes.

## 2018-10-14 NOTE — PROGRESS NOTE ADULT - ASSESSMENT
H[facility] 59 [facility]  Adrenal insufficiency, Anemia, CKD, CAD, Glaucoma, HLD, HTN, Peripheral vascular disease and a significant PSHx of below left knee amputation sent for low blood sugar. Upon arrival Blood sugar were found to be 135. Patient uses insulin at home  Patient states he last used Lantus yesterday. Denies nausea, vomiting, diarrhea, abdominal pain, SOB, chest pain, EDGE, palpitations, dizziness, headache, cough, wheezing, joint pain or swelling, fever, chills.  NKDA.    ED course: Patient examined at bedside NAD  VS WNL   Physical exam as above  Labs significant for: NORMAL BLOOD SUGARS. U/A negative for any infection   Imaging significant for Chest X-ray Negative for any infiltrates    Patient will be admitted to the floor for Hypoglycemia

## 2018-10-15 LAB
ANION GAP SERPL CALC-SCNC: 10 MMOL/L — SIGNIFICANT CHANGE UP (ref 5–17)
BUN SERPL-MCNC: 62 MG/DL — HIGH (ref 7–18)
CALCIUM SERPL-MCNC: 8.1 MG/DL — LOW (ref 8.4–10.5)
CHLORIDE SERPL-SCNC: 112 MMOL/L — HIGH (ref 96–108)
CO2 SERPL-SCNC: 24 MMOL/L — SIGNIFICANT CHANGE UP (ref 22–31)
CREAT SERPL-MCNC: 7.5 MG/DL — HIGH (ref 0.5–1.3)
GLUCOSE BLDC GLUCOMTR-MCNC: 127 MG/DL — HIGH (ref 70–99)
GLUCOSE BLDC GLUCOMTR-MCNC: 181 MG/DL — HIGH (ref 70–99)
GLUCOSE BLDC GLUCOMTR-MCNC: 183 MG/DL — HIGH (ref 70–99)
GLUCOSE BLDC GLUCOMTR-MCNC: 98 MG/DL — SIGNIFICANT CHANGE UP (ref 70–99)
GLUCOSE SERPL-MCNC: 101 MG/DL — HIGH (ref 70–99)
HCT VFR BLD CALC: 22.6 % — LOW (ref 39–50)
HGB BLD-MCNC: 7.2 G/DL — LOW (ref 13–17)
MAGNESIUM SERPL-MCNC: 2.4 MG/DL — SIGNIFICANT CHANGE UP (ref 1.6–2.6)
MCHC RBC-ENTMCNC: 29.1 PG — SIGNIFICANT CHANGE UP (ref 27–34)
MCHC RBC-ENTMCNC: 31.9 GM/DL — LOW (ref 32–36)
MCV RBC AUTO: 91.2 FL — SIGNIFICANT CHANGE UP (ref 80–100)
OB PNL STL: POSITIVE
PHOSPHATE SERPL-MCNC: 4.7 MG/DL — HIGH (ref 2.5–4.5)
PLATELET # BLD AUTO: 129 K/UL — LOW (ref 150–400)
POTASSIUM SERPL-MCNC: 4.4 MMOL/L — SIGNIFICANT CHANGE UP (ref 3.5–5.3)
POTASSIUM SERPL-SCNC: 4.4 MMOL/L — SIGNIFICANT CHANGE UP (ref 3.5–5.3)
RBC # BLD: 2.48 M/UL — LOW (ref 4.2–5.8)
RBC # FLD: 14.5 % — SIGNIFICANT CHANGE UP (ref 10.3–14.5)
SODIUM SERPL-SCNC: 146 MMOL/L — HIGH (ref 135–145)
WBC # BLD: 3.1 K/UL — LOW (ref 3.8–10.5)
WBC # FLD AUTO: 3.1 K/UL — LOW (ref 3.8–10.5)

## 2018-10-15 PROCEDURE — 93990 DOPPLER FLOW TESTING: CPT | Mod: 26

## 2018-10-15 RX ORDER — LABETALOL HCL 100 MG
200 TABLET ORAL
Qty: 0 | Refills: 0 | Status: DISCONTINUED | OUTPATIENT
Start: 2018-10-15 | End: 2018-10-15

## 2018-10-15 RX ORDER — ERYTHROPOIETIN 10000 [IU]/ML
8000 INJECTION, SOLUTION INTRAVENOUS; SUBCUTANEOUS
Qty: 0 | Refills: 0 | Status: DISCONTINUED | OUTPATIENT
Start: 2018-10-15 | End: 2018-10-16

## 2018-10-15 RX ORDER — CALCIUM ACETATE 667 MG
667 TABLET ORAL
Qty: 0 | Refills: 0 | Status: DISCONTINUED | OUTPATIENT
Start: 2018-10-15 | End: 2018-10-16

## 2018-10-15 RX ADMIN — Medication 30 MILLIGRAM(S): at 04:12

## 2018-10-15 RX ADMIN — SIMVASTATIN 40 MILLIGRAM(S): 20 TABLET, FILM COATED ORAL at 21:24

## 2018-10-15 RX ADMIN — PREGABALIN 1000 MICROGRAM(S): 225 CAPSULE ORAL at 12:13

## 2018-10-15 RX ADMIN — FLUDROCORTISONE ACETATE 0.1 MILLIGRAM(S): 0.1 TABLET ORAL at 04:11

## 2018-10-15 RX ADMIN — Medication 667 MILLIGRAM(S): at 17:04

## 2018-10-15 RX ADMIN — Medication 81 MILLIGRAM(S): at 12:13

## 2018-10-15 RX ADMIN — Medication 650 MILLIGRAM(S): at 04:12

## 2018-10-15 RX ADMIN — Medication 25 MILLIGRAM(S): at 21:26

## 2018-10-15 RX ADMIN — Medication 325 MILLIGRAM(S): at 12:13

## 2018-10-15 RX ADMIN — Medication 25 MILLIGRAM(S): at 04:10

## 2018-10-15 RX ADMIN — FLUDROCORTISONE ACETATE 0.1 MILLIGRAM(S): 0.1 TABLET ORAL at 17:04

## 2018-10-15 RX ADMIN — INSULIN GLARGINE 4 UNIT(S): 100 INJECTION, SOLUTION SUBCUTANEOUS at 21:24

## 2018-10-15 RX ADMIN — Medication 1000 UNIT(S): at 12:13

## 2018-10-15 RX ADMIN — Medication 1: at 12:28

## 2018-10-15 RX ADMIN — Medication 1: at 17:05

## 2018-10-15 NOTE — PROGRESS NOTE ADULT - SUBJECTIVE AND OBJECTIVE BOX
Patient is a 58y old  Male who presents with a chief complaint of Hypoglycemia (15 Oct 2018 09:55)      INTERVAL HPI/OVERNIGHT EVENTS:  Patient seen and examined at bedside. Denies chest pain, palpitation, SOB, nausea, vomiting, abdominal pain.    T(C): 36.7 (10-14-18 @ 23:55), Max: 37.2 (10-14-18 @ 16:13)  HR: 35 (10-15-18 @ 04:15) (35 - 101)  BP: 173/94 (10-15-18 @ 04:15) (132/73 - 173/94)  RR: 18 (10-14-18 @ 23:55) (18 - 18)  SpO2: 97% (10-14-18 @ 23:55) (97% - 100%)  Wt(kg): --  I&O's Summary        REVIEW OF SYSTEMS:  No fever,   No cough, SOB  No chest pain, palpitations  No Abd pain, nausea, vomiting, No diarrhea or constipation      PHYSICAL EXAM:    Neuro: AAO x  EYES: EOMI, PERRLA,  NECK: Supple, No JVD, Normal thyroid  Resp: CTAB, No crackles, wheezing,   CVS: Regular rate and rhythm; No murmurs, rubs, or gallops  ABD: Soft, Nontender, Nondistended; Bowel sounds present  EXTREMITIES:  2+ Peripheral Pulses, No edema    LABS:                        7.2    3.7   )-----------( 137      ( 14 Oct 2018 21:06 )             23.0             CAPILLARY BLOOD GLUCOSE      POCT Blood Glucose.: 98 mg/dL (15 Oct 2018 08:26)  POCT Blood Glucose.: 162 mg/dL (14 Oct 2018 21:28)  POCT Blood Glucose.: 239 mg/dL (14 Oct 2018 17:15)  POCT Blood Glucose.: 206 mg/dL (14 Oct 2018 11:46)          MEDICATIONS  (STANDING):  aspirin  chewable 81 milliGRAM(s) Oral daily  cholecalciferol 1000 Unit(s) Oral daily  cyanocobalamin 1000 MICROGram(s) Oral daily  epoetin beverley Injectable 97688 Unit(s) SubCutaneous <User Schedule>  ferrous    sulfate 325 milliGRAM(s) Oral daily  fludroCORTISONE 0.1 milliGRAM(s) Oral two times a day  heparin  Injectable 5000 Unit(s) SubCutaneous every 8 hours  insulin glargine Injectable (LANTUS) 4 Unit(s) SubCutaneous at bedtime  insulin lispro (HumaLOG) corrective regimen sliding scale   SubCutaneous Before meals and at bedtime  NIFEdipine XL 30 milliGRAM(s) Oral daily  simvastatin 40 milliGRAM(s) Oral at bedtime  sodium bicarbonate 650 milliGRAM(s) Oral three times a day    MEDICATIONS  (PRN):  diphenhydrAMINE 25 milliGRAM(s) Oral every 4 hours PRN Rash and/or Itching      Radiology: Patient is a 58y old  Male who presents with a chief complaint of Hypoglycemia (15 Oct 2018 09:55)      INTERVAL HPI/OVERNIGHT EVENTS:  Patient seen and examined at bedside. Denies chest pain, palpitation, SOB, nausea, vomiting, abdominal pain.    T(C): 36.7 (10-14-18 @ 23:55), Max: 37.2 (10-14-18 @ 16:13)  HR: 35 (10-15-18 @ 04:15) (35 - 101)  BP: 173/94 (10-15-18 @ 04:15) (132/73 - 173/94)  RR: 18 (10-14-18 @ 23:55) (18 - 18)  SpO2: 97% (10-14-18 @ 23:55) (97% - 100%)  Wt(kg): --  I&O's Summary        REVIEW OF SYSTEMS:  No fever,   No cough, SOB  No chest pain, palpitations  No Abd pain, nausea, vomiting, No diarrhea or constipation      PHYSICAL EXAM:    Neuro: AAO x  EYES: EOMI, PERRLA,  NECK: Supple, No JVD, Normal thyroid  Resp: CTAB, No crackles, wheezing,   CVS: Regular rate and rhythm; No murmurs, rubs, or gallops  ABD: Soft, Nontender, Nondistended; Bowel sounds present  EXTREMITIES:  2+ Peripheral Pulses, No edema    LABS:                        7.2    3.7   )-----------( 137      ( 14 Oct 2018 21:06 )             23.0             CAPILLARY BLOOD GLUCOSE      POCT Blood Glucose.: 98 mg/dL (15 Oct 2018 08:26)  POCT Blood Glucose.: 162 mg/dL (14 Oct 2018 21:28)  POCT Blood Glucose.: 239 mg/dL (14 Oct 2018 17:15)  POCT Blood Glucose.: 206 mg/dL (14 Oct 2018 11:46)          MEDICATIONS  (STANDING):  aspirin  chewable 81 milliGRAM(s) Oral daily  cholecalciferol 1000 Unit(s) Oral daily  cyanocobalamin 1000 MICROGram(s) Oral daily  epoetin beverley Injectable 37517 Unit(s) SubCutaneous <User Schedule>  ferrous    sulfate 325 milliGRAM(s) Oral daily  fludroCORTISONE 0.1 milliGRAM(s) Oral two times a day  heparin  Injectable 5000 Unit(s) SubCutaneous every 8 hours  insulin glargine Injectable (LANTUS) 4 Unit(s) SubCutaneous at bedtime  insulin lispro (HumaLOG) corrective regimen sliding scale   SubCutaneous Before meals and at bedtime  NIFEdipine XL 30 milliGRAM(s) Oral daily  simvastatin 40 milliGRAM(s) Oral at bedtime  sodium bicarbonate 650 milliGRAM(s) Oral three times a day    MEDICATIONS  (PRN):  diphenhydrAMINE 25 milliGRAM(s) Oral every 4 hours PRN Rash and/or Itching      Radiology:  < from: Xray Chest 1 View-PORTABLE IMMEDIATE (10.12.18 @ 22:39) >  EXAM:  XR CHEST PORTABLE IMMED 1V                            PROCEDURE DATE:  10/12/2018          INTERPRETATION:  AP semierect chest on October 12, 2018 at 10:36 PM.   Patient has hypothermia.    Heart is magnified by technique.    The lung fieldsand pleural surfaces are unremarkable.    The chest is similar to January 18 of this year.    IMPRESSION: No infiltrate.    < end of copied text > Patient is a 58y old  Male who presents with a chief complaint of Hypoglycemia (15 Oct 2018 09:55)      INTERVAL HPI/OVERNIGHT EVENTS:  Patient seen and examined at bedside. Denies chest pain, palpitation, SOB, nausea, vomiting, abdominal pain.    T(C): 36.7 (10-14-18 @ 23:55), Max: 37.2 (10-14-18 @ 16:13)  HR: 35 (10-15-18 @ 04:15) (35 - 101)  BP: 173/94 (10-15-18 @ 04:15) (132/73 - 173/94)  RR: 18 (10-14-18 @ 23:55) (18 - 18)  SpO2: 97% (10-14-18 @ 23:55) (97% - 100%)  Wt(kg): --  I&O's Summary        REVIEW OF SYSTEMS:  No fever,   No cough, SOB  No chest pain, palpitations  No Abd pain, nausea, vomiting, No diarrhea or constipation      PHYSICAL EXAM:    Neuro: AAO x 3  EYES: EOMI, PERRLA,  NECK: Supple, No JVD, Normal thyroid  Resp: CTAB, No crackles, wheezing,   CVS: Regular rate and rhythm; No murmurs, rubs, or gallops  ABD: Soft, Nontender, Nondistended; Bowel sounds present  EXTREMITIES:  2+ Peripheral Pulse on right side, No edema    LABS:                        7.2    3.7   )-----------( 137      ( 14 Oct 2018 21:06 )             23.0             CAPILLARY BLOOD GLUCOSE      POCT Blood Glucose.: 98 mg/dL (15 Oct 2018 08:26)  POCT Blood Glucose.: 162 mg/dL (14 Oct 2018 21:28)  POCT Blood Glucose.: 239 mg/dL (14 Oct 2018 17:15)  POCT Blood Glucose.: 206 mg/dL (14 Oct 2018 11:46)          MEDICATIONS  (STANDING):  aspirin  chewable 81 milliGRAM(s) Oral daily  cholecalciferol 1000 Unit(s) Oral daily  cyanocobalamin 1000 MICROGram(s) Oral daily  epoetin beverley Injectable 12027 Unit(s) SubCutaneous <User Schedule>  ferrous    sulfate 325 milliGRAM(s) Oral daily  fludroCORTISONE 0.1 milliGRAM(s) Oral two times a day  heparin  Injectable 5000 Unit(s) SubCutaneous every 8 hours  insulin glargine Injectable (LANTUS) 4 Unit(s) SubCutaneous at bedtime  insulin lispro (HumaLOG) corrective regimen sliding scale   SubCutaneous Before meals and at bedtime  NIFEdipine XL 30 milliGRAM(s) Oral daily  simvastatin 40 milliGRAM(s) Oral at bedtime  sodium bicarbonate 650 milliGRAM(s) Oral three times a day    MEDICATIONS  (PRN):  diphenhydrAMINE 25 milliGRAM(s) Oral every 4 hours PRN Rash and/or Itching      Radiology:  < from: Xray Chest 1 View-PORTABLE IMMEDIATE (10.12.18 @ 22:39) >  EXAM:  XR CHEST PORTABLE IMMED 1V                            PROCEDURE DATE:  10/12/2018          INTERPRETATION:  AP semierect chest on October 12, 2018 at 10:36 PM.   Patient has hypothermia.    Heart is magnified by technique.    The lung fieldsand pleural surfaces are unremarkable.    The chest is similar to January 18 of this year.    IMPRESSION: No infiltrate.    < end of copied text >

## 2018-10-15 NOTE — CONSULT NOTE ADULT - SUBJECTIVE AND OBJECTIVE BOX
HPI:  HPI: 59 y/o Male from [facility] assisted living with PMHx of Adrenal insufficiency, Anemia, CKD, CAD, Glaucoma, HLD, HTN, Peripheral vascular disease and a significant PSHx of below left knee amputation sent for low blood sugar. Upon arrival Blood sugar were found to be 135. Patient uses insulin at home  Patient states he last used Lantus yesterday. Denies nausea, vomiting, diarrhea, abdominal pain, SOB, chest pain, EDGE, palpitations, dizziness, headache, cough, wheezing, joint pain or swelling, fever, chills.  NKDA. (13 Oct 2018 02:10)    Patient seen and examined at bedside for possible AVF placement/planning. 58 year old male with PMH CKD, HTn, DM2, PVD s/p left BKA presented c/o dizziness due to hypoglycemia. Patient is right handed, but states that he wants to go home today and have the fistula placed in about one month. Denies further dizziness, chest pain, shortness of breath.     PAST MEDICAL & SURGICAL HISTORY:  Peripheral vascular disease  HLD (hyperlipidemia)  Coronary artery disease  Glaucoma  Anemia  CKD (chronic kidney disease)  Adrenal insufficiency  Hypertension  Below knee amputation status, left    Review of Systems: Contained within HPI    MEDICATIONS  (STANDING):  aspirin  chewable 81 milliGRAM(s) Oral daily  cholecalciferol 1000 Unit(s) Oral daily  cyanocobalamin 1000 MICROGram(s) Oral daily  epoetin beverley Injectable 77636 Unit(s) SubCutaneous <User Schedule>  ferrous    sulfate 325 milliGRAM(s) Oral daily  fludroCORTISONE 0.1 milliGRAM(s) Oral two times a day  heparin  Injectable 5000 Unit(s) SubCutaneous every 8 hours  insulin glargine Injectable (LANTUS) 4 Unit(s) SubCutaneous at bedtime  insulin lispro (HumaLOG) corrective regimen sliding scale   SubCutaneous Before meals and at bedtime  NIFEdipine XL 30 milliGRAM(s) Oral daily  simvastatin 40 milliGRAM(s) Oral at bedtime  sodium bicarbonate 650 milliGRAM(s) Oral three times a day    MEDICATIONS  (PRN):  diphenhydrAMINE 25 milliGRAM(s) Oral every 4 hours PRN Rash and/or Itching    Allergies    fish (Rash)  liver (Anaphylaxis)  No Known Drug Allergies    FAMILY HISTORY:  No pertinent family history in first degree relatives    Vital Signs Last 24 Hrs  T(C): 36.7 (14 Oct 2018 23:55), Max: 37.2 (14 Oct 2018 16:13)  T(F): 98.1 (14 Oct 2018 23:55), Max: 99 (14 Oct 2018 16:13)  HR: 35 (15 Oct 2018 04:15) (35 - 101)  BP: 173/94 (15 Oct 2018 04:15) (132/73 - 173/94)  RR: 18 (14 Oct 2018 23:55) (18 - 18)  SpO2: 97% (14 Oct 2018 23:55) (97% - 100%)    Physical Exam:    General:  Appears stated age, well-groomed, well-nourished, no distress  HENT:  WNL, no JVD  Chest: respirations nonlabored  Abdomen:  soft, NT  Extremities: IV present in LUE, left BKA stump intact, RLE without edema, warm   Vascular: 2+ radial pulses bilaterally  Skin: warm and dry  Musculoskeletal:  no calf tenderness RLE  Neuro/Psych:  Alert, oriented to time, place and person     LABS:                        7.2    3.7   )-----------( 137      ( 14 Oct 2018 21:06 )             23.0     Comprehensive Metabolic Panel (10.12.18 @ 20:46)    Sodium, Serum: 139 mmol/L    Potassium, Serum: 3.8 mmol/L    Chloride, Serum: 106 mmol/L    Carbon Dioxide, Serum: 24 mmol/L    Anion Gap, Serum: 9 mmol/L    Blood Urea Nitrogen, Serum: 69 mg/dL    Creatinine, Serum: 7.62 mg/dL    Glucose, Serum: 97 mg/dL    Calcium, Total Serum: 7.8 mg/dL    Protein Total, Serum: 6.8 g/dL    Albumin, Serum: 3.4 g/dL    Bilirubin Total, Serum: 0.5 mg/dL    Alkaline Phosphatase, Serum: 116 U/L    Aspartate Aminotransferase (AST/SGOT): 11 U/L    Alanine Aminotransferase (ALT/SGPT): 30 U/L DA    eGFR if Non : 7: Interpretative comment  The units for eGFR are ml/min/1.73m2 (normalized body surface area). The  eGFR is calculated from a serum creatinine using the CKD-EPI equation.  Other variables required for calculation are race, age and sex. Among  patients with chronic kidney disease (CKD), the eGFR is useful in  determining the stage of disease according to KDOQI CKD classification.  All eGFR results are reported numerically with the following  interpretation.          GFR                    With                 Without     (ml/min/1.73 m2)    Kidney Damage       Kidney Damage        >= 90                    Stage 1                     Normal        60-89                    Stage 2                     Decreased GFR        30-59     Stage 3                     Stage 3        15-29                    Stage 4                     Stage 4        < 15                      Stage 5                     Stage 5  Each stage of CKD assumes that the associated GFR level has been in  effect for at least 3 months. Determination of stages one and two (with  eGFR > 59 ml/min/m2) requires estimation of kidney damage for at least 3  months as defined by structural or functional abnormalities.  Limitations: All estimates of GFR will be less accurate for patients at  extremes of muscle mass (including but not limited to frail elderly,  critically ill, or cancer patients), those with unusual diets, and those  with conditions associated with reduced secretion or extrarenal  elimination of creatinine. The eGFR equation is not recommended for use  in patients with unstable creatinine levels. mL/min/1.73M2    eGFR if African American: 8 mL/min/1.73M2

## 2018-10-15 NOTE — CONSULT NOTE ADULT - ATTENDING COMMENTS
Called re scheduling perm access yest.  Pt Seen ex'ed today early am  Was on add on OR johann fro L AVF/G  US reviewed: has small L ceph v  DW'ed pt: he adamantly refused to consider any intervention at this time.  Offered to johann at pt's convenience  Please no punctures in L UE

## 2018-10-15 NOTE — PROGRESS NOTE ADULT - ASSESSMENT
ASSESSMENT AND PLAN:    1. ALDA on CKD   Patient renal function progressively worsened, will need BMP and reevaluate  concerned for obstructive nephropathy as acutely  worsened without evident of perfusion or dehydration   Avoid Nephrotoxic Meds/ Agents such as NSAIDs, Gadolinium contrast, Phosphate containing enemas, etc.)  Adjust Medications according to eGFR  Keep patient euvolemic and renal diet  Vascular surgeon evaluation re AVF creation  Obtain TSAT, PO4 and PTH      2. Anemia due to CKD  On Epogen    3. Hypertension on procardia   not well controlled, also likely from Epogen use    4. MBD ASSESSMENT AND PLAN:    1. ALDA on CKD   Patient renal function progressively worsened, will need BMP and reevaluate  concerned for obstructive nephropathy as acutely  worsened without evident of perfusion or dehydration   Avoid Nephrotoxic Meds/ Agents such as NSAIDs, Gadolinium contrast, Phosphate containing enemas, etc.)  Adjust Medications according to eGFR  Keep patient euvolemic and renal diet  -avf as per surgery  -may need hd if pt develops uremia ,severe acidosis,hyperkalemia or fluid overload      2. Anemia due to CKD  On Epogen but hold if bp is high  -f/u Hb daily  -check iron sat  -stool for ob x 2    3. Hypertension on procardia   not well controlled  -please titrate bp meds to keep bp<140/80  -low salt diet    4. MBD:secondary to ckd  -pt ha smild high phos level  -add phoslo  -f/u pth level ASSESSMENT AND PLAN:    1. ALDA on CKD   Patient renal function progressively worsened,   Avoid Nephrotoxic Meds/ Agents such as NSAIDs, Gadolinium contrast, Phosphate containing enemas, etc.)  Adjust Medications according to eGFR  Keep patient euvolemic and renal diet  -avf as per surgery  -may need hd if pt develops uremia ,severe acidosis,hyperkalemia or fluid overload      2. Anemia due to CKD  On Epogen but hold if bp is high  -f/u Hb daily  -check iron sat  -stool for ob x 2    3. Hypertension on procardia   not well controlled  -please titrate bp meds to keep bp<140/80  -low salt diet    4. MBD:secondary to ckd  -pt ha smild high phos level  -added phoslo  -f/u pth level

## 2018-10-15 NOTE — PROGRESS NOTE ADULT - ASSESSMENT
58 year old male with CKD, DM2, anemia, PVD, left BKA, HTN    - AVF planning for 10/16  - preop labs ordered for AM  - medical clearance requested  - vein mapping ordered, will need prior to OR  - DO not use LUE, please move IV to RUE  - if anemia severe may require transfusion of blood on hold for OR  - discussed with Dr. Chaudhry and medical team

## 2018-10-15 NOTE — CHART NOTE - NSCHARTNOTEFT_GEN_A_CORE
PATIENT IS SEEN AND EXAMINED  - PATIENT IS SCHEDULED FOR AV FISTULA INSERTION IN AM BY DR. JEFFERSON.   - ANEMIA OF CKD. # 1UNIT OF PRBC IS BEING GIVEN TODAY AND BEING MEDICALLY OPTIMISED  - PATIENT IS MODERATE SURGICAL RISK FOR AVF INSERTION  - D/W FLOOR TEAM  - DR. MAC

## 2018-10-15 NOTE — PROGRESS NOTE ADULT - SUBJECTIVE AND OBJECTIVE BOX
PGY 1 Note discussed with supervising resident and primary attending    Patient is a 58y old  Male who presents with a chief complaint of Hypoglycemia (15 Oct 2018 11:07)      INTERVAL HPI/OVERNIGHT EVENTS: offers no new complaints; current symptoms resolving    MEDICATIONS  (STANDING):  aspirin  chewable 81 milliGRAM(s) Oral daily  calcium acetate 667 milliGRAM(s) Oral three times a day with meals  cholecalciferol 1000 Unit(s) Oral daily  cyanocobalamin 1000 MICROGram(s) Oral daily  epoetin beverley Injectable 8000 Unit(s) SubCutaneous <User Schedule>  ferrous    sulfate 325 milliGRAM(s) Oral daily  fludroCORTISONE 0.1 milliGRAM(s) Oral two times a day  heparin  Injectable 5000 Unit(s) SubCutaneous every 8 hours  insulin glargine Injectable (LANTUS) 4 Unit(s) SubCutaneous at bedtime  insulin lispro (HumaLOG) corrective regimen sliding scale   SubCutaneous Before meals and at bedtime  NIFEdipine XL 30 milliGRAM(s) Oral daily  simvastatin 40 milliGRAM(s) Oral at bedtime    MEDICATIONS  (PRN):  diphenhydrAMINE 25 milliGRAM(s) Oral every 4 hours PRN Rash and/or Itching      __________________________________________________  REVIEW OF SYSTEMS:    CONSTITUTIONAL: No fever,   EYES: no acute visual disturbances  NECK: No pain or stiffness  RESPIRATORY: No cough; No shortness of breath  CARDIOVASCULAR: No chest pain, no palpitations  GASTROINTESTINAL: No pain. No nausea or vomiting; No diarrhea   NEUROLOGICAL: No headache or numbness, no tremors  MUSCULOSKELETAL: No joint pain, no muscle pain  GENITOURINARY: no dysuria, no frequency, no hesitancy  PSYCHIATRY: no depression , no anxiety  ALL OTHER  ROS negative        Vital Signs Last 24 Hrs  T(C): 36.6 (15 Oct 2018 16:16), Max: 36.7 (14 Oct 2018 23:55)  T(F): 97.8 (15 Oct 2018 16:16), Max: 98.1 (14 Oct 2018 23:55)  HR: 88 (15 Oct 2018 16:16) (35 - 101)  BP: 131/74 (15 Oct 2018 16:16) (131/74 - 173/94)  BP(mean): --  RR: 17 (15 Oct 2018 16:16) (17 - 18)  SpO2: 99% (15 Oct 2018 16:16) (97% - 99%)    ________________________________________________  PHYSICAL EXAM:  GENERAL: NAD  HEENT: Normocephalic;  conjunctivae and sclerae clear; moist mucous membranes;   NECK : supple  CHEST/LUNG: Clear to auscultation bilaterally with good air entry   HEART: S1 S2  regular; no murmurs, gallops or rubs  ABDOMEN: Soft, Nontender, Nondistended; Bowel sounds present  EXTREMITIES: no cyanosis; no edema; no calf tenderness, s/p left BKA  SKIN: warm and dry; no rash  NERVOUS SYSTEM:  Awake and alert; Oriented  to place, person and time ; no new deficits    _________________________________________________  LABS:                        7.2    3.1   )-----------( 129      ( 15 Oct 2018 10:37 )             22.6     10-15    146<H>  |  112<H>  |  62<H>  ----------------------------<  101<H>  4.4   |  24  |  7.50<H>    Ca    8.1<L>      15 Oct 2018 10:37  Phos  4.7     10-15  Mg     2.4     10-15          CAPILLARY BLOOD GLUCOSE      POCT Blood Glucose.: 181 mg/dL (15 Oct 2018 16:45)  POCT Blood Glucose.: 183 mg/dL (15 Oct 2018 12:07)  POCT Blood Glucose.: 98 mg/dL (15 Oct 2018 08:26)  POCT Blood Glucose.: 162 mg/dL (14 Oct 2018 21:28)  POCT Blood Glucose.: 239 mg/dL (14 Oct 2018 17:15)        RADIOLOGY & ADDITIONAL TESTS:    Imaging Personally Reviewed:  YES    Consultant(s) Notes Reviewed:   YES    Care Discussed with Consultants :     Plan of care was discussed with patient and /or primary care giver; all questions and concerns were addressed and care was aligned with patient's wishes.

## 2018-10-15 NOTE — PROGRESS NOTE ADULT - SUBJECTIVE AND OBJECTIVE BOX
Surgery Pre-op Note    Preoperative Diagnosis: CKD    Planned Procedure: LUE AVF planning                       7.2    3.1   )-----------( 129      ( 15 Oct 2018 10:37 )             22.6     Type & Screen: ABO RH Interpretation: O POS (10.14.18 @ 21:03)    CXR: < from: Xray Chest 1 View-PORTABLE IMMEDIATE (10.12.18 @ 22:39) >  IMPRESSION: No infiltrate.    < end of copied text >

## 2018-10-15 NOTE — CONSULT NOTE ADULT - ASSESSMENT
58 year old male with CKD. Admitted with dizziness secondary to hypoglycemia, anemia, possible GI bleed. PMH HTN, DM, PVD, left BKA.     - Do not use LUE for possible left AVF in the future, please move IV to RUE  - if patient planned for discharge can follow up as outpatient for vein mapping and AVF planning  - continue medical management and work-up   - will need medical clearance and vein mapping prior to any OR procedure.

## 2018-10-16 ENCOUNTER — TRANSCRIPTION ENCOUNTER (OUTPATIENT)
Age: 58
End: 2018-10-16

## 2018-10-16 VITALS
SYSTOLIC BLOOD PRESSURE: 175 MMHG | HEART RATE: 87 BPM | OXYGEN SATURATION: 99 % | TEMPERATURE: 98 F | RESPIRATION RATE: 16 BRPM | DIASTOLIC BLOOD PRESSURE: 90 MMHG

## 2018-10-16 LAB
GLUCOSE BLDC GLUCOMTR-MCNC: 203 MG/DL — HIGH (ref 70–99)
GLUCOSE BLDC GLUCOMTR-MCNC: 89 MG/DL — SIGNIFICANT CHANGE UP (ref 70–99)

## 2018-10-16 PROCEDURE — 86923 COMPATIBILITY TEST ELECTRIC: CPT

## 2018-10-16 PROCEDURE — 87581 M.PNEUMON DNA AMP PROBE: CPT

## 2018-10-16 PROCEDURE — 93005 ELECTROCARDIOGRAM TRACING: CPT

## 2018-10-16 PROCEDURE — 83605 ASSAY OF LACTIC ACID: CPT

## 2018-10-16 PROCEDURE — 83690 ASSAY OF LIPASE: CPT

## 2018-10-16 PROCEDURE — 86901 BLOOD TYPING SEROLOGIC RH(D): CPT

## 2018-10-16 PROCEDURE — 87040 BLOOD CULTURE FOR BACTERIA: CPT

## 2018-10-16 PROCEDURE — 85027 COMPLETE CBC AUTOMATED: CPT

## 2018-10-16 PROCEDURE — 86900 BLOOD TYPING SEROLOGIC ABO: CPT

## 2018-10-16 PROCEDURE — 99232 SBSQ HOSP IP/OBS MODERATE 35: CPT

## 2018-10-16 PROCEDURE — 87486 CHLMYD PNEUM DNA AMP PROBE: CPT

## 2018-10-16 PROCEDURE — P9040: CPT

## 2018-10-16 PROCEDURE — 93990 DOPPLER FLOW TESTING: CPT

## 2018-10-16 PROCEDURE — 84100 ASSAY OF PHOSPHORUS: CPT

## 2018-10-16 PROCEDURE — 80048 BASIC METABOLIC PNL TOTAL CA: CPT

## 2018-10-16 PROCEDURE — 99285 EMERGENCY DEPT VISIT HI MDM: CPT | Mod: 25

## 2018-10-16 PROCEDURE — 82962 GLUCOSE BLOOD TEST: CPT

## 2018-10-16 PROCEDURE — 87798 DETECT AGENT NOS DNA AMP: CPT

## 2018-10-16 PROCEDURE — 81001 URINALYSIS AUTO W/SCOPE: CPT

## 2018-10-16 PROCEDURE — 80053 COMPREHEN METABOLIC PANEL: CPT

## 2018-10-16 PROCEDURE — 83735 ASSAY OF MAGNESIUM: CPT

## 2018-10-16 PROCEDURE — 87633 RESP VIRUS 12-25 TARGETS: CPT

## 2018-10-16 PROCEDURE — 36430 TRANSFUSION BLD/BLD COMPNT: CPT

## 2018-10-16 PROCEDURE — 82272 OCCULT BLD FECES 1-3 TESTS: CPT

## 2018-10-16 PROCEDURE — 71045 X-RAY EXAM CHEST 1 VIEW: CPT

## 2018-10-16 PROCEDURE — 84484 ASSAY OF TROPONIN QUANT: CPT

## 2018-10-16 PROCEDURE — 86850 RBC ANTIBODY SCREEN: CPT

## 2018-10-16 RX ORDER — DIPHENHYDRAMINE HCL 50 MG
1 CAPSULE ORAL
Qty: 0 | Refills: 0 | COMMUNITY

## 2018-10-16 RX ORDER — SODIUM BICARBONATE 1 MEQ/ML
2 SYRINGE (ML) INTRAVENOUS
Qty: 0 | Refills: 0 | COMMUNITY

## 2018-10-16 RX ORDER — FLUDROCORTISONE ACETATE 0.1 MG/1
1 TABLET ORAL
Qty: 0 | Refills: 0 | COMMUNITY

## 2018-10-16 RX ORDER — METOPROLOL TARTRATE 50 MG
25 TABLET ORAL
Qty: 0 | Refills: 0 | Status: DISCONTINUED | OUTPATIENT
Start: 2018-10-16 | End: 2018-10-16

## 2018-10-16 RX ORDER — CALCIUM ACETATE 667 MG
1 TABLET ORAL
Qty: 0 | Refills: 0 | DISCHARGE
Start: 2018-10-16

## 2018-10-16 RX ORDER — INSULIN GLARGINE 100 [IU]/ML
8 INJECTION, SOLUTION SUBCUTANEOUS
Qty: 0 | Refills: 0 | COMMUNITY

## 2018-10-16 RX ORDER — METOPROLOL TARTRATE 50 MG
1 TABLET ORAL
Qty: 0 | Refills: 0 | COMMUNITY
Start: 2018-10-16

## 2018-10-16 RX ADMIN — Medication 25 MILLIGRAM(S): at 14:21

## 2018-10-16 RX ADMIN — Medication 667 MILLIGRAM(S): at 13:23

## 2018-10-16 RX ADMIN — HEPARIN SODIUM 5000 UNIT(S): 5000 INJECTION INTRAVENOUS; SUBCUTANEOUS at 14:22

## 2018-10-16 RX ADMIN — PREGABALIN 1000 MICROGRAM(S): 225 CAPSULE ORAL at 13:23

## 2018-10-16 RX ADMIN — Medication 30 MILLIGRAM(S): at 05:33

## 2018-10-16 RX ADMIN — Medication 81 MILLIGRAM(S): at 13:23

## 2018-10-16 RX ADMIN — Medication 1000 UNIT(S): at 13:23

## 2018-10-16 RX ADMIN — FLUDROCORTISONE ACETATE 0.1 MILLIGRAM(S): 0.1 TABLET ORAL at 05:32

## 2018-10-16 RX ADMIN — Medication 325 MILLIGRAM(S): at 13:23

## 2018-10-16 NOTE — DISCHARGE NOTE ADULT - HOSPITAL COURSE
59 y/o Male from [facility] assisted living with PMHx of Adrenal insufficiency, Anemia, CKD, CAD, Glaucoma, HLD, HTN, Peripheral vascular disease and a significant PSHx of below left knee amputation sent for low blood sugar. Upon arrival Blood sugar were found to be 135. Patient uses insulin at home  Patient states he last used Lantus yesterday. Denies nausea, vomiting, diarrhea, abdominal pain, SOB, chest pain, EDGE, palpitations, dizziness, headache, cough, wheezing, joint pain or swelling, fever, chills.  NKDA. ED course: Patient examined at bedside NAD, VS WNL, Labs significant for: NORMAL BLOOD SUGARS. U/A negative for any infection. Imaging significant for Chest X-ray Negative for any infiltrates. Patient will be admitted to the floor for Hypoglycemia - now corrected, Insulin adjusted. BP medications adjusted - added Lopressor. Patient refused vascular intervention for AV fistula despite risks of doing so and the danger of not preparing for HD. Patient seen and examined at bedside with no acute complaints. The medical plan and results were discussed with the patient throughout the hospital course. Patient is medically clear for discharge and is advised to follow up with his primary care physician within a week for continued medical care. Please see above and the medical records for additional information.

## 2018-10-16 NOTE — DISCHARGE NOTE ADULT - CARE PLAN
Principal Discharge DX:	Hypoglycemia  Goal:	Avoid hypoglycemia  Assessment and plan of treatment:	Your Lantus was adjusted to 4 units at night - Follow up with your primary care physician within a week from discharge  Secondary Diagnosis:	CKD (chronic kidney disease)  Assessment and plan of treatment:	You have chronic kidney disease, and your kidney function was monitored during your admission to the hospital. Please continue to follow up with your primary care provider to monitor your kidneys - we attempted to place a AV fistula however you refused and elected for outpatient management despite the risks of noncompliance. Principal Discharge DX:	Hypoglycemia  Goal:	Avoid hypoglycemia  Assessment and plan of treatment:	Your Lantus was adjusted to 4 units at night - Follow up with your primary care physician within a week from discharge  Secondary Diagnosis:	CKD (chronic kidney disease)  Assessment and plan of treatment:	You have chronic kidney disease, and your kidney function was monitored during your admission to the hospital. Please continue to follow up with your primary care provider to monitor your kidneys - we attempted to place a AV fistula however you refused and elected for outpatient management despite the risks of noncompliance.  Secondary Diagnosis:	Adrenal insufficiency  Assessment and plan of treatment:	Because of your high blood pressure we holding your florinef medication. Please follow up with your primary care doctor in a week to recheck your blood pressure.

## 2018-10-16 NOTE — DISCHARGE NOTE ADULT - MEDICATION SUMMARY - MEDICATIONS TO TAKE
I will START or STAY ON the medications listed below when I get home from the hospital:    Florinef Acetate 0.1 mg oral tablet  -- 1 tab(s) by mouth 2 times a day  -- Indication: For Adrenal insufficiency    aspirin 81 mg oral tablet  -- 1 tab(s) by mouth once a day  -- Indication: For Prophylactic measure    Lantus 100 units/mL subcutaneous solution  -- 4 unit(s) subcutaneous once a day (at bedtime)  -- Indication: For DM    Zocor 40 mg oral tablet  -- 1 tab(s) by mouth once a day (at bedtime)  -- Indication: For HLD (hyperlipidemia)    Metoprolol Tartrate 25 mg oral tablet  -- 1 tab(s) by mouth 2 times a day  -- Indication: For Hypertension    NIFEdipine 30 mg oral tablet, extended release  -- 1 tab(s) by mouth once a day  -- Indication: For Hypertension    Artificial Tears ophthalmic ointment  -- 1 application to each affected eye 4 times a day  -- Indication: For Dry eyes    calcium acetate 667 mg oral tablet  -- 1 tab(s) by mouth 3 times a day  -- Indication: For CKD (chronic kidney disease)    Vitamin B-12 1000 mcg oral tablet  -- 1 tab(s) by mouth once a day  -- Indication: For Prophylactic measure    Vitamin D3 1000 intl units oral tablet  -- 1 tab(s) by mouth once a day  -- Indication: For Prophylactic measure I will START or STAY ON the medications listed below when I get home from the hospital:    aspirin 81 mg oral tablet  -- 1 tab(s) by mouth once a day  -- Indication: For Prophylactic measure    Lantus 100 units/mL subcutaneous solution  -- 4 unit(s) subcutaneous once a day (at bedtime)  -- Indication: For DM    Zocor 40 mg oral tablet  -- 1 tab(s) by mouth once a day (at bedtime)  -- Indication: For HLD (hyperlipidemia)    Metoprolol Tartrate 25 mg oral tablet  -- 1 tab(s) by mouth 2 times a day  -- Indication: For Hypertension    NIFEdipine 30 mg oral tablet, extended release  -- 1 tab(s) by mouth once a day  -- Indication: For Hypertension    Artificial Tears ophthalmic ointment  -- 1 application to each affected eye 4 times a day  -- Indication: For Dry eyes    calcium acetate 667 mg oral tablet  -- 1 tab(s) by mouth 3 times a day  -- Indication: For CKD (chronic kidney disease)    Vitamin B-12 1000 mcg oral tablet  -- 1 tab(s) by mouth once a day  -- Indication: For Prophylactic measure    Vitamin D3 1000 intl units oral tablet  -- 1 tab(s) by mouth once a day  -- Indication: For Prophylactic measure

## 2018-10-16 NOTE — DISCHARGE NOTE ADULT - MEDICATION SUMMARY - MEDICATIONS TO STOP TAKING
I will STOP taking the medications listed below when I get home from the hospital:    Benadryl 25 mg oral capsule  -- 1 cap(s) by mouth once a day (at bedtime)    sodium bicarbonate 650 mg oral tablet  -- 2 tab(s) by mouth 3 times a day    glucagon 1 mg injection  -- 1 milligram(s) injectable once a day  if FS <60 I will STOP taking the medications listed below when I get home from the hospital:    Lantus 100 units/mL subcutaneous solution  -- 8 unit(s) subcutaneous once a day (at bedtime)    Benadryl 25 mg oral capsule  -- 1 cap(s) by mouth once a day (at bedtime)    sodium bicarbonate 650 mg oral tablet  -- 2 tab(s) by mouth 3 times a day    glucagon 1 mg injection  -- 1 milligram(s) injectable once a day  if FS <60

## 2018-10-16 NOTE — PROGRESS NOTE ADULT - SUBJECTIVE AND OBJECTIVE BOX
Patient is a 58y old  Male who presents with a chief complaint of Hypoglycemia (15 Oct 2018 17:06)      INTERVAL HPI/OVERNIGHT EVENTS:  Patient seen and examined at bedside. Denies chest pain, palpitation, SOB, nausea, vomiting, abdominal pain.    T(C): 36.4 (10-16-18 @ 08:33), Max: 36.6 (10-15-18 @ 16:16)  HR: 87 (10-16-18 @ 08:33) (87 - 96)  BP: 175/90 (10-16-18 @ 08:33) (131/74 - 175/90)  RR: 16 (10-16-18 @ 08:33) (16 - 17)  SpO2: 99% (10-16-18 @ 08:33) (99% - 100%)  Wt(kg): --  I&O's Summary        REVIEW OF SYSTEMS:  No fever,   No cough, SOB  No chest pain, palpitations  No Abd pain, nausea, vomiting, No diarrhea or constipation      PHYSICAL EXAM:    Neuro: AAO x 3  EYES: EOMI, PERRLA,  NECK: Supple, No JVD, Normal thyroid  Resp: CTAB, No crackles, wheezing,   CVS: Regular rate and rhythm; No murmurs, rubs, or gallops  ABD: Soft, Nontender, Nondistended; Bowel sounds present  amputated leg    LABS:                        7.2    3.1   )-----------( 129      ( 15 Oct 2018 10:37 )             22.6     10-15    146<H>  |  112<H>  |  62<H>  ----------------------------<  101<H>  4.4   |  24  |  7.50<H>    Ca    8.1<L>      15 Oct 2018 10:37  Phos  4.7     10-15  Mg     2.4     10-15        CAPILLARY BLOOD GLUCOSE      POCT Blood Glucose.: 89 mg/dL (16 Oct 2018 08:39)  POCT Blood Glucose.: 127 mg/dL (15 Oct 2018 21:32)  POCT Blood Glucose.: 181 mg/dL (15 Oct 2018 16:45)  POCT Blood Glucose.: 183 mg/dL (15 Oct 2018 12:07)          MEDICATIONS  (STANDING):  aspirin  chewable 81 milliGRAM(s) Oral daily  calcium acetate 667 milliGRAM(s) Oral three times a day with meals  cholecalciferol 1000 Unit(s) Oral daily  cyanocobalamin 1000 MICROGram(s) Oral daily  epoetin beverley Injectable 8000 Unit(s) SubCutaneous <User Schedule>  ferrous    sulfate 325 milliGRAM(s) Oral daily  fludroCORTISONE 0.1 milliGRAM(s) Oral two times a day  heparin  Injectable 5000 Unit(s) SubCutaneous every 8 hours  insulin glargine Injectable (LANTUS) 4 Unit(s) SubCutaneous at bedtime  insulin lispro (HumaLOG) corrective regimen sliding scale   SubCutaneous Before meals and at bedtime  NIFEdipine XL 30 milliGRAM(s) Oral daily  simvastatin 40 milliGRAM(s) Oral at bedtime    MEDICATIONS  (PRN):  diphenhydrAMINE 25 milliGRAM(s) Oral every 4 hours PRN Rash and/or Itching      Radiology: Patient is a 58y old  Male who presents with a chief complaint of Hypoglycemia (15 Oct 2018 17:06)      INTERVAL HPI/OVERNIGHT EVENTS:  Patient seen and examined at bedside. Denies chest pain, palpitation, SOB, nausea, vomiting, abdominal pain.  pt is refusing blood test and avf placement ,seen by vascular  surgery      T(C): 36.4 (10-16-18 @ 08:33), Max: 36.6 (10-15-18 @ 16:16)  HR: 87 (10-16-18 @ 08:33) (87 - 96)  BP: 175/90 (10-16-18 @ 08:33) (131/74 - 175/90)  RR: 16 (10-16-18 @ 08:33) (16 - 17)  SpO2: 99% (10-16-18 @ 08:33) (99% - 100%)  Wt(kg): --  I&O's Summary        REVIEW OF SYSTEMS:  No fever,   No cough, SOB  No chest pain, palpitations  No Abd pain, nausea, vomiting, No diarrhea or constipation      PHYSICAL EXAM:    Neuro: AAO x 3  EYES: EOMI, PERRLA,  NECK: Supple, No JVD, Normal thyroid  Resp: CTAB, No crackles, wheezing,   CVS: Regular rate and rhythm; No murmurs, rubs, or gallops  ABD: Soft, Nontender, Nondistended; Bowel sounds present  EXT: s/p BKA , no edema noted    LABS:                        7.2    3.1   )-----------( 129      ( 15 Oct 2018 10:37 )             22.6     10-15    146<H>  |  112<H>  |  62<H>  ----------------------------<  101<H>  4.4   |  24  |  7.50<H>    Ca    8.1<L>      15 Oct 2018 10:37  Phos  4.7     10-15  Mg     2.4     10-15        CAPILLARY BLOOD GLUCOSE      POCT Blood Glucose.: 89 mg/dL (16 Oct 2018 08:39)  POCT Blood Glucose.: 127 mg/dL (15 Oct 2018 21:32)  POCT Blood Glucose.: 181 mg/dL (15 Oct 2018 16:45)  POCT Blood Glucose.: 183 mg/dL (15 Oct 2018 12:07)          MEDICATIONS  (STANDING):  aspirin  chewable 81 milliGRAM(s) Oral daily  calcium acetate 667 milliGRAM(s) Oral three times a day with meals  cholecalciferol 1000 Unit(s) Oral daily  cyanocobalamin 1000 MICROGram(s) Oral daily  epoetin beverley Injectable 8000 Unit(s) SubCutaneous <User Schedule>  ferrous    sulfate 325 milliGRAM(s) Oral daily  fludroCORTISONE 0.1 milliGRAM(s) Oral two times a day  heparin  Injectable 5000 Unit(s) SubCutaneous every 8 hours  insulin glargine Injectable (LANTUS) 4 Unit(s) SubCutaneous at bedtime  insulin lispro (HumaLOG) corrective regimen sliding scale   SubCutaneous Before meals and at bedtime  NIFEdipine XL 30 milliGRAM(s) Oral daily  simvastatin 40 milliGRAM(s) Oral at bedtime    MEDICATIONS  (PRN):  diphenhydrAMINE 25 milliGRAM(s) Oral every 4 hours PRN Rash and/or Itching

## 2018-10-16 NOTE — DISCHARGE NOTE ADULT - MEDICATION SUMMARY - MEDICATIONS TO CHANGE
I will SWITCH the dose or number of times a day I take the medications listed below when I get home from the hospital:    Lantus 100 units/mL subcutaneous solution  -- 8 unit(s) subcutaneous once a day (at bedtime) I will SWITCH the dose or number of times a day I take the medications listed below when I get home from the hospital:    Florinef Acetate 0.1 mg oral tablet  -- 1 tab(s) by mouth 2 times a day

## 2018-10-16 NOTE — PROGRESS NOTE ADULT - ASSESSMENT
ASSESSMENT AND PLAN:    1. ALDA on CKD   Patient renal function progressively worsened,   Avoid Nephrotoxic Meds/ Agents such as NSAIDs, Gadolinium contrast, Phosphate containing enemas, etc.)  Adjust Medications according to eGFR  Keep patient euvolemic and renal diet  -avf as per surgery  -may need hd if pt develops uremia ,severe acidosis,hyperkalemia or fluid overload      2. Anemia due to CKD  On Epogen but hold if bp is high  -f/u Hb daily  -anemia work up as per primary team     3. Hypertension on procardia - likely from florinef use  -unsure patient still need the medication, please get AM cortisol level and readjust  -please titrate bp meds to keep bp<140/80  -low salt diet    4. 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 Discussed with primary team, was told patient is also refusing AVF inpatient. ASSESSMENT AND PLAN:    1. ALDA on CKD(stage 5 )  Patient renal function progressively worsened but pt does not need urgent hd at this time  Avoid Nephrotoxic Meds/ Agents such as NSAIDs, Gadolinium contrast, Phosphate containing enemas, etc.)  Adjust Medications according to eGFR  Keep patient euvolemic and renal diet  -avf as outpatient asap as pt is refusing avf now in the hospital  -may need hd if pt develops uremia ,severe acidosis,hyperkalemia or fluid overload next few days to weeks  -suggest to moniter bmp q week at [facility]isted living and f/u with renal MD(Dr Petra Bullock) in 2 weeks      2. Anemia due to CKD  On Epogen but hold if bp is high  -f/u Hb daily  -anemia work up as per primary team     3. Hypertension on procardia - likely from florinef use  -suggest to d/c florinef as bp is high  -please titrate(add Metoprolol 50mg bid) bp meds to keep bp<140/80  -low salt diet    4. 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

## 2018-10-16 NOTE — DISCHARGE NOTE ADULT - PLAN OF CARE
Avoid hypoglycemia Your Lantus was adjusted to 4 units at night - Follow up with your primary care physician within a week from discharge You have chronic kidney disease, and your kidney function was monitored during your admission to the hospital. Please continue to follow up with your primary care provider to monitor your kidneys - we attempted to place a AV fistula however you refused and elected for outpatient management despite the risks of noncompliance. Because of your high blood pressure we holding your florinef medication. Please follow up with your primary care doctor in a week to recheck your blood pressure.

## 2018-10-16 NOTE — PROGRESS NOTE ADULT - PROVIDER SPECIALTY LIST ADULT
Internal Medicine
Nephrology
Nephrology
Vascular Surgery
Nephrology

## 2018-10-16 NOTE — DISCHARGE NOTE ADULT - PATIENT PORTAL LINK FT
You can access the [facility] Patient Portal, offered by [facility], by registering with the following website: [email or web address]

## 2018-10-19 PROBLEM — I73.9 PERIPHERAL VASCULAR DISEASE, UNSPECIFIED: Chronic | Status: ACTIVE | Noted: 2018-10-12

## 2018-10-19 PROBLEM — E78.5 HYPERLIPIDEMIA, UNSPECIFIED: Chronic | Status: ACTIVE | Noted: 2018-10-12

## 2018-10-25 ENCOUNTER — OUTPATIENT (OUTPATIENT)
Dept: OUTPATIENT SERVICES | Facility: HOSPITAL | Age: 58
LOS: 1 days | End: 2018-10-25
Payer: MEDICAID

## 2018-10-25 VITALS
TEMPERATURE: 98 F | HEIGHT: 63 IN | RESPIRATION RATE: 18 BRPM | SYSTOLIC BLOOD PRESSURE: 128 MMHG | OXYGEN SATURATION: 98 % | WEIGHT: 128.09 LBS | HEART RATE: 88 BPM | DIASTOLIC BLOOD PRESSURE: 84 MMHG

## 2018-10-25 DIAGNOSIS — Z98.41 CATARACT EXTRACTION STATUS, RIGHT EYE: Chronic | ICD-10-CM

## 2018-10-25 DIAGNOSIS — Z01.818 ENCOUNTER FOR OTHER PREPROCEDURAL EXAMINATION: ICD-10-CM

## 2018-10-25 DIAGNOSIS — E11.9 TYPE 2 DIABETES MELLITUS WITHOUT COMPLICATIONS: ICD-10-CM

## 2018-10-25 DIAGNOSIS — I10 ESSENTIAL (PRIMARY) HYPERTENSION: ICD-10-CM

## 2018-10-25 DIAGNOSIS — Z98.42 CATARACT EXTRACTION STATUS, LEFT EYE: Chronic | ICD-10-CM

## 2018-10-25 DIAGNOSIS — Z89.512 ACQUIRED ABSENCE OF LEFT LEG BELOW KNEE: Chronic | ICD-10-CM

## 2018-10-25 DIAGNOSIS — N18.9 CHRONIC KIDNEY DISEASE, UNSPECIFIED: ICD-10-CM

## 2018-10-25 DIAGNOSIS — I12.9 HYPERTENSIVE CHRONIC KIDNEY DISEASE WITH STAGE 1 THROUGH STAGE 4 CHRONIC KIDNEY DISEASE, OR UNSPECIFIED CHRONIC KIDNEY DISEASE: ICD-10-CM

## 2018-10-25 PROCEDURE — G0463: CPT

## 2018-10-25 RX ORDER — SODIUM CHLORIDE 9 MG/ML
3 INJECTION INTRAMUSCULAR; INTRAVENOUS; SUBCUTANEOUS EVERY 8 HOURS
Qty: 0 | Refills: 0 | Status: DISCONTINUED | OUTPATIENT
Start: 2018-11-08 | End: 2018-11-16

## 2018-10-25 RX ORDER — INSULIN GLARGINE 100 [IU]/ML
4 INJECTION, SOLUTION SUBCUTANEOUS
Qty: 0 | Refills: 0 | COMMUNITY

## 2018-10-25 NOTE — H&P PST ADULT - ASSESSMENT
58 yr old male with PMH of HTN, DM, diabetic retinopathy, glaucoma, osteoporosis, OA, LS spinal stenosis, PVD, hyperparathyroidism, PVD presents with CKD. Pt for right arm brachiocephalic arteriovenous fistula on 11/08/2018 in anticipation of hemodialysis.

## 2018-10-25 NOTE — H&P PST ADULT - OPHTHALMOLOGIC COMMENTS
glaucoma, h/o bilateral cataract extraction glaucoma, h/o bilateral cataract extraction, c/o loss of vision of left eye

## 2018-10-25 NOTE — H&P PST ADULT - FAMILY HISTORY
No pertinent family history in first degree relatives Mother  Still living? No  Family history of cirrhosis of liver, Age at diagnosis: Age Unknown     Sibling  Still living? No  Asthma, Age at diagnosis: Age Unknown  Family history of AIDS, Age at diagnosis: Age Unknown

## 2018-10-25 NOTE — H&P PST ADULT - NEGATIVE ALLERGY TYPES
no reactions to animals/no outdoor environmental allergies/no indoor environmental allergies/no reactions to insect bites/no reactions to medicines

## 2018-10-25 NOTE — H&P PST ADULT - NEGATIVE GASTROINTESTINAL SYMPTOMS
no flatulence/no abdominal pain/no diarrhea/no vomiting/no nausea/no constipation/no change in bowel habits

## 2018-10-25 NOTE — H&P PST ADULT - MUSCULOSKELETAL COMMENTS
OA, osteoporosis, osteomyelitis of vertebrae, LS spinal stenosis, contractures of right and left hand fingers

## 2018-10-25 NOTE — H&P PST ADULT - NSANTHOSAYNRD_GEN_A_CORE
No. RASHIDA screening performed.  STOP BANG Legend: 0-2 = LOW Risk; 3-4 = INTERMEDIATE Risk; 5-8 = HIGH Risk

## 2018-10-25 NOTE — H&P PST ADULT - PSH
Below knee amputation status, left Below knee amputation status, left    History of left cataract extraction    History of right cataract extraction

## 2018-10-25 NOTE — H&P PST ADULT - PMH
Adrenal insufficiency    Anemia    CKD (chronic kidney disease)    Coronary artery disease    Glaucoma    HLD (hyperlipidemia)    Hypertension    Peripheral vascular disease Adrenal insufficiency    Anemia    CKD (chronic kidney disease)    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  Coronary artery disease    Diabetes mellitus    Diabetic neuropathy    Glaucoma    HLD (hyperlipidemia)    Hyperparathyroidism    Hypertension    Osteoarthritis    Osteoporosis    Peripheral vascular disease    Spinal stenosis of lumbosacral region    Vision loss of left eye

## 2018-10-25 NOTE — H&P PST ADULT - RS GEN PE MLT RESP DETAILS PC
respirations non-labored/breath sounds equal/no wheezes/normal/clear to auscultation bilaterally/no rhonchi/no chest wall tenderness/no intercostal retractions/no subcutaneous emphysema/no rales/good air movement/airway patent

## 2018-10-25 NOTE — H&P PST ADULT - HISTORY OF PRESENT ILLNESS
58 yr old male with PMH of HTN, DM, diabetic retinopathy, glaucoma, osteoporosis, OA, LS spinal stenosis, PVD, hyperparathyroidism, PVD presents with c/o CKD. Pt for right arm brachiocephalic arteriovenous fistula on 11/08/2018 in anticipation of hemodialysis.

## 2018-11-07 ENCOUNTER — TRANSCRIPTION ENCOUNTER (OUTPATIENT)
Age: 58
End: 2018-11-07

## 2018-11-08 ENCOUNTER — OUTPATIENT (OUTPATIENT)
Dept: OUTPATIENT SERVICES | Facility: HOSPITAL | Age: 58
LOS: 1 days | End: 2018-11-08
Payer: MEDICAID

## 2018-11-08 ENCOUNTER — APPOINTMENT (OUTPATIENT)
Dept: VASCULAR SURGERY | Facility: HOSPITAL | Age: 58
End: 2018-11-08

## 2018-11-08 VITALS
DIASTOLIC BLOOD PRESSURE: 65 MMHG | RESPIRATION RATE: 14 BRPM | HEART RATE: 77 BPM | OXYGEN SATURATION: 98 % | TEMPERATURE: 98 F | SYSTOLIC BLOOD PRESSURE: 117 MMHG

## 2018-11-08 VITALS
TEMPERATURE: 98 F | OXYGEN SATURATION: 100 % | WEIGHT: 128.09 LBS | HEART RATE: 88 BPM | SYSTOLIC BLOOD PRESSURE: 149 MMHG | DIASTOLIC BLOOD PRESSURE: 94 MMHG | RESPIRATION RATE: 16 BRPM | HEIGHT: 63 IN

## 2018-11-08 DIAGNOSIS — Z98.41 CATARACT EXTRACTION STATUS, RIGHT EYE: Chronic | ICD-10-CM

## 2018-11-08 DIAGNOSIS — Z89.512 ACQUIRED ABSENCE OF LEFT LEG BELOW KNEE: Chronic | ICD-10-CM

## 2018-11-08 DIAGNOSIS — Z98.42 CATARACT EXTRACTION STATUS, LEFT EYE: Chronic | ICD-10-CM

## 2018-11-08 DIAGNOSIS — I12.9 HYPERTENSIVE CHRONIC KIDNEY DISEASE WITH STAGE 1 THROUGH STAGE 4 CHRONIC KIDNEY DISEASE, OR UNSPECIFIED CHRONIC KIDNEY DISEASE: ICD-10-CM

## 2018-11-08 LAB
ANION GAP SERPL CALC-SCNC: 8 MMOL/L — SIGNIFICANT CHANGE UP (ref 5–17)
BUN SERPL-MCNC: 54 MG/DL — HIGH (ref 7–18)
CALCIUM SERPL-MCNC: 7.6 MG/DL — LOW (ref 8.4–10.5)
CHLORIDE SERPL-SCNC: 107 MMOL/L — SIGNIFICANT CHANGE UP (ref 96–108)
CO2 SERPL-SCNC: 27 MMOL/L — SIGNIFICANT CHANGE UP (ref 22–31)
CREAT SERPL-MCNC: 7.51 MG/DL — HIGH (ref 0.5–1.3)
GLUCOSE BLDC GLUCOMTR-MCNC: 179 MG/DL — HIGH (ref 70–99)
GLUCOSE BLDC GLUCOMTR-MCNC: 246 MG/DL — HIGH (ref 70–99)
GLUCOSE BLDC GLUCOMTR-MCNC: 255 MG/DL — HIGH (ref 70–99)
GLUCOSE SERPL-MCNC: 240 MG/DL — HIGH (ref 70–99)
POTASSIUM SERPL-MCNC: 3.4 MMOL/L — LOW (ref 3.5–5.3)
POTASSIUM SERPL-SCNC: 3.4 MMOL/L — LOW (ref 3.5–5.3)
SODIUM SERPL-SCNC: 142 MMOL/L — SIGNIFICANT CHANGE UP (ref 135–145)

## 2018-11-08 PROCEDURE — C1889: CPT

## 2018-11-08 PROCEDURE — 82962 GLUCOSE BLOOD TEST: CPT

## 2018-11-08 PROCEDURE — 36818 AV FUSE UPPR ARM CEPHALIC: CPT

## 2018-11-08 PROCEDURE — 80048 BASIC METABOLIC PNL TOTAL CA: CPT

## 2018-11-08 PROCEDURE — 36821 AV FUSION DIRECT ANY SITE: CPT | Mod: RT

## 2018-11-08 RX ORDER — INSULIN HUMAN 100 [IU]/ML
4 INJECTION, SOLUTION SUBCUTANEOUS ONCE
Qty: 0 | Refills: 0 | Status: COMPLETED | OUTPATIENT
Start: 2018-11-08 | End: 2018-11-08

## 2018-11-08 RX ORDER — OXYCODONE AND ACETAMINOPHEN 5; 325 MG/1; MG/1
1 TABLET ORAL EVERY 4 HOURS
Qty: 0 | Refills: 0 | Status: DISCONTINUED | OUTPATIENT
Start: 2018-11-08 | End: 2018-11-08

## 2018-11-08 RX ORDER — ONDANSETRON 8 MG/1
4 TABLET, FILM COATED ORAL ONCE
Qty: 0 | Refills: 0 | Status: DISCONTINUED | OUTPATIENT
Start: 2018-11-08 | End: 2018-11-08

## 2018-11-08 RX ORDER — HYDROMORPHONE HYDROCHLORIDE 2 MG/ML
0.25 INJECTION INTRAMUSCULAR; INTRAVENOUS; SUBCUTANEOUS
Qty: 0 | Refills: 0 | Status: DISCONTINUED | OUTPATIENT
Start: 2018-11-08 | End: 2018-11-08

## 2018-11-08 RX ORDER — SODIUM CHLORIDE 9 MG/ML
1000 INJECTION INTRAMUSCULAR; INTRAVENOUS; SUBCUTANEOUS
Qty: 0 | Refills: 0 | Status: DISCONTINUED | OUTPATIENT
Start: 2018-11-08 | End: 2018-11-08

## 2018-11-08 RX ORDER — ONDANSETRON 8 MG/1
4 TABLET, FILM COATED ORAL EVERY 6 HOURS
Qty: 0 | Refills: 0 | Status: DISCONTINUED | OUTPATIENT
Start: 2018-11-08 | End: 2018-11-16

## 2018-11-08 RX ADMIN — INSULIN HUMAN 4 UNIT(S): 100 INJECTION, SOLUTION SUBCUTANEOUS at 12:28

## 2018-11-08 RX ADMIN — SODIUM CHLORIDE 3 MILLILITER(S): 9 INJECTION INTRAMUSCULAR; INTRAVENOUS; SUBCUTANEOUS at 08:34

## 2018-11-08 NOTE — ASU DISCHARGE PLAN (ADULT/PEDIATRIC). - NOTIFY
Bleeding that does not stop/Pain not relieved by Medications/Swelling that continues/Fever greater than 101/Inability to Tolerate Liquids or Foods/Persistent Nausea and Vomiting/Numbness, color, or temperature change to extremity/Unable to Urinate

## 2018-11-08 NOTE — ASU DISCHARGE PLAN (ADULT/PEDIATRIC). - MEDICATION SUMMARY - MEDICATIONS TO TAKE
I will START or STAY ON the medications listed below when I get home from the hospital:    insulin regular U100  -- 4 unit(s) subcutaneously 2 times a day  201-250=4 units; 251-300=6 units; 301-350=8 units; 351-400=10 units  -- Indication: For As previously prescribed    fludrocortisone 0.1 mg oral tablet  -- 1 tab(s) by mouth 2 times a day  -- Indication: For As previously prescribed    Aspirin Enteric Coated 81 mg oral delayed release tablet  -- 1 tab(s) by mouth once a day  -- Indication: For As previously prescribed    sodium bicarbonate 650 mg oral tablet  -- 2 tab(s) by mouth 3 times a day  -- Indication: For As previously prescribed    Benadryl 25 mg oral tablet  -- 1 tab(s) by mouth once a day (at bedtime)  -- Indication: For As previously prescribed    Zocor 40 mg oral tablet  -- 1 tab(s) by mouth once a day (at bedtime)  -- Indication: For As previously prescribed    NIFEdipine 30 mg oral tablet, extended release  -- 1 tab(s) by mouth once a day  -- Indication: For As previously prescribed    Epogen 10,000 units/mL injectable solution  -- 1 milliliter(s) injectable once a week  -- Indication: For As previously prescribed    glucagon 1 mg injection  -- 1 milligram(s) intramuscularly once a day, As Needed for BS < 60 mg/dl as per FS directions  -- Indication: For As previously prescribed    ferrous sulfate 325 mg (65 mg elemental iron) oral tablet  -- 1 tab(s) by mouth once a day  -- Indication: For As previously prescribed    Artificial Tears ophthalmic ointment  -- 1 application to each affected eye 4 times a day  -- Indication: For As previously prescribed    calcium acetate 667 mg oral tablet  -- 1 tab(s) by mouth 3 times a day  -- Indication: For As previously prescribed    Vitamin B-12 1000 mcg oral tablet  -- 1 tab(s) by mouth once a day  -- Indication: For As previously prescribed    Vitamin D3 1000 intl units oral tablet  -- 1 tab(s) by mouth once a day  -- Indication: For As previously prescribed    folic acid 1 mg oral tablet  -- 1 tab(s) by mouth once a day  -- Indication: For As previously prescribed

## 2018-11-08 NOTE — ASU PATIENT PROFILE, ADULT - PSH
Below knee amputation status, left    History of left cataract extraction    History of right cataract extraction

## 2018-11-08 NOTE — ASU PATIENT PROFILE, ADULT - PMH
Adrenal insufficiency    Anemia    CKD (chronic kidney disease)    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  Coronary artery disease    Diabetes mellitus    Diabetic neuropathy    Glaucoma    HLD (hyperlipidemia)    Hyperparathyroidism    Hypertension    Osteoarthritis    Osteoporosis    Peripheral vascular disease    Spinal stenosis of lumbosacral region    Vision loss of left eye

## 2018-11-08 NOTE — ASU PREOP CHECKLIST - SELECT TESTS ORDERED
BMP/CBC PT/PTT/INR/EKG/POCT Blood Glucose/CXR/fingerstick 255 Dr. Cervantes notified; no further orders/BMP/CBC INR/K level 3.4 Dr Cervantes Aware;  fingerstick 255 Dr. Cervantes notified; no further orders/PT/PTT/POCT Blood Glucose/BMP/CBC/EKG/CXR

## 2018-11-08 NOTE — BRIEF OPERATIVE NOTE - PROCEDURE
<<-----Click on this checkbox to enter Procedure Creation of arteriovenous fistula in right upper extremity  11/08/2018  Brachial Cephalic Fistula  Active  [id number]

## 2018-11-30 ENCOUNTER — APPOINTMENT (OUTPATIENT)
Dept: VASCULAR SURGERY | Facility: CLINIC | Age: 58
End: 2018-11-30
Payer: MEDICAID

## 2018-11-30 VITALS
BODY MASS INDEX: 23.55 KG/M2 | DIASTOLIC BLOOD PRESSURE: 85 MMHG | SYSTOLIC BLOOD PRESSURE: 152 MMHG | WEIGHT: 128 LBS | HEIGHT: 62 IN | HEART RATE: 106 BPM

## 2018-11-30 PROBLEM — M48.07 SPINAL STENOSIS, LUMBOSACRAL REGION: Chronic | Status: ACTIVE | Noted: 2018-10-25

## 2018-11-30 PROBLEM — M24.549 CONTRACTURE, UNSPECIFIED HAND: Chronic | Status: ACTIVE | Noted: 2018-10-25

## 2018-11-30 PROBLEM — E21.3 HYPERPARATHYROIDISM, UNSPECIFIED: Chronic | Status: ACTIVE | Noted: 2018-10-25

## 2018-11-30 PROBLEM — E11.9 TYPE 2 DIABETES MELLITUS WITHOUT COMPLICATIONS: Chronic | Status: ACTIVE | Noted: 2018-10-25

## 2018-11-30 PROBLEM — E11.40 TYPE 2 DIABETES MELLITUS WITH DIABETIC NEUROPATHY, UNSPECIFIED: Chronic | Status: ACTIVE | Noted: 2018-10-25

## 2018-11-30 PROBLEM — M19.90 UNSPECIFIED OSTEOARTHRITIS, UNSPECIFIED SITE: Chronic | Status: ACTIVE | Noted: 2018-10-25

## 2018-11-30 PROCEDURE — 99024 POSTOP FOLLOW-UP VISIT: CPT

## 2018-11-30 PROCEDURE — 93990 DOPPLER FLOW TESTING: CPT

## 2018-12-10 ENCOUNTER — FORM ENCOUNTER (OUTPATIENT)
Age: 58
End: 2018-12-10

## 2018-12-11 ENCOUNTER — APPOINTMENT (OUTPATIENT)
Dept: ENDOVASCULAR SURGERY | Facility: CLINIC | Age: 58
End: 2018-12-11
Payer: MEDICAID

## 2018-12-11 PROCEDURE — 36215Z: CUSTOM | Mod: 78

## 2018-12-11 PROCEDURE — 36902Z: CUSTOM | Mod: 78

## 2018-12-11 PROCEDURE — 36909Z: CUSTOM

## 2018-12-11 PROCEDURE — 36011Z: CUSTOM | Mod: 59

## 2018-12-21 PROBLEM — Z86.79 HISTORY OF CARDIOVASCULAR DISORDER: Status: RESOLVED | Noted: 2018-12-21 | Resolved: 2018-12-21

## 2018-12-21 RX ORDER — VITAMIN B COMPLEX
CAPSULE ORAL
Refills: 0 | Status: ACTIVE | COMMUNITY

## 2018-12-21 RX ORDER — CALCIUM ACETATE 667 MG/1
667 TABLET ORAL
Refills: 0 | Status: ACTIVE | COMMUNITY

## 2018-12-21 RX ORDER — METOPROLOL TARTRATE 25 MG/1
25 TABLET, FILM COATED ORAL
Refills: 0 | Status: ACTIVE | COMMUNITY

## 2018-12-24 PROBLEM — Z86.79 HISTORY OF PERIPHERAL VASCULAR DISEASE: Status: RESOLVED | Noted: 2018-12-24 | Resolved: 2018-12-24

## 2018-12-24 PROBLEM — E78.00 HYPERCHOLESTEROLEMIA: Status: ACTIVE | Noted: 2018-12-24

## 2018-12-24 PROBLEM — Z09 POSTOP CHECK: Status: RESOLVED | Noted: 2018-11-30 | Resolved: 2018-12-24

## 2018-12-25 ENCOUNTER — FORM ENCOUNTER (OUTPATIENT)
Age: 58
End: 2018-12-25

## 2018-12-26 ENCOUNTER — APPOINTMENT (OUTPATIENT)
Dept: ENDOVASCULAR SURGERY | Facility: CLINIC | Age: 58
End: 2018-12-26
Payer: MEDICAID

## 2018-12-26 VITALS — OXYGEN SATURATION: 100 % | TEMPERATURE: 98.8 F | HEART RATE: 89 BPM

## 2018-12-26 VITALS
SYSTOLIC BLOOD PRESSURE: 176 MMHG | BODY MASS INDEX: 24.48 KG/M2 | DIASTOLIC BLOOD PRESSURE: 100 MMHG | WEIGHT: 133 LBS | HEIGHT: 62 IN

## 2018-12-26 DIAGNOSIS — Z86.79 PERSONAL HISTORY OF OTHER DISEASES OF THE CIRCULATORY SYSTEM: ICD-10-CM

## 2018-12-26 DIAGNOSIS — H54.3 UNQUALIFIED VISUAL LOSS, BOTH EYES: ICD-10-CM

## 2018-12-26 DIAGNOSIS — E78.00 PURE HYPERCHOLESTEROLEMIA, UNSPECIFIED: ICD-10-CM

## 2018-12-26 DIAGNOSIS — Z09 ENCOUNTER FOR FOLLOW-UP EXAMINATION AFTER COMPLETED TREATMENT FOR CONDITIONS OTHER THAN MALIGNANT NEOPLASM: ICD-10-CM

## 2018-12-26 PROCEDURE — 76937 US GUIDE VASCULAR ACCESS: CPT

## 2018-12-26 PROCEDURE — 36902Z: CUSTOM | Mod: 78

## 2018-12-26 PROCEDURE — 36216Z: CUSTOM | Mod: 79

## 2018-12-26 RX ORDER — OSELTAMIVIR PHOSPHATE 30 MG/1
CAPSULE ORAL
Refills: 0 | Status: DISCONTINUED | COMMUNITY
End: 2018-12-26

## 2019-01-11 ENCOUNTER — APPOINTMENT (OUTPATIENT)
Dept: VASCULAR SURGERY | Facility: CLINIC | Age: 59
End: 2019-01-11
Payer: MEDICAID

## 2019-01-11 PROCEDURE — 93990 DOPPLER FLOW TESTING: CPT

## 2019-01-11 PROCEDURE — 99024 POSTOP FOLLOW-UP VISIT: CPT

## 2019-01-14 NOTE — PHYSICAL EXAM
[Thrill] : thrill [aneurysm] : no aneurysm [Bleeding] : no bleeding [2+] : left 2+ [Normal] : normoactive bowel sounds, soft and nontender, no hepatosplenomegaly or masses appreciated

## 2019-01-14 NOTE — ASSESSMENT
[FreeTextEntry1] : right AVF with no significant stenosis\par no difficulty on HD\par f/u in 3 months

## 2019-04-04 NOTE — PROGRESS NOTE ADULT - ASSESSMENT
Patient : Chapincito Robertson Age: 14 year old Sex: male   MRN: [id number] Encounter Date: 4/4/2019      History     Chief Complaint   Patient presents with   • Vomiting     HPI   Pt is Haitian speaking. History was obtained with the assistance of a .     P05/P5  4/4/2019  12:40 PM Chapincito Robertson is a 14 year old male with h/o asthma who presents to the ED accompanied by his mother for evaluation of abd pain that began yesterday. The pt also reports sore throat, N/V, decreased appetite, dizziness, rhinorrhea, and cough. The pt denies myalgias, diarrhea, or any other associated symptoms. The pt denies any ill contacts. Pt has an inhaler, but does not take any other daily medications. His vaccinations are up to date. He got a flu shot this year. He has not had any surgeries. There are no further complaints or modifying factors at this time.    PCP: [address] [place] [address]    No Known Allergies    Medical History reviewed. H/o asthma.    Surgical History reviewed. No pertinent past surgical history.    Family History reviewed. No pertinent family history.    Social History     Tobacco Use   • Smoking status: Not addressed   Substance Use Topics   • Alcohol use: Not addressed   • Drug use: Not addressed       Review of Systems   Constitutional: Positive for appetite change (decreased). Negative for chills and fever.   HENT: Positive for rhinorrhea and sore throat. Negative for congestion.    Eyes: Negative for discharge and visual disturbance.   Respiratory: Positive for cough. Negative for shortness of breath.    Cardiovascular: Negative for chest pain.   Gastrointestinal: Positive for abdominal pain, nausea and vomiting. Negative for diarrhea.   Genitourinary: Negative for difficulty urinating.   Musculoskeletal: Negative for myalgias.   Skin: Negative for rash.   Neurological: Positive for dizziness. Negative for weakness, numbness and headaches.   Psychiatric/Behavioral: Negative for  confusion.       Physical Exam     ED Triage Vitals   ED Triage Vitals Group      Temp 04/04/19 1032 100 °F (37.8 °C)      Heart Rate 04/04/19 1032 121      Resp 04/04/19 1032 16      BP 04/04/19 1032 108/61      SpO2 04/04/19 1032 97 %      EtCO2 mmHg --       Height 04/04/19 1034 5' 4\" (1.626 m)      Weight 04/04/19 1034 119 lb 7.8 oz (54.2 kg)      Weight Scale Used 04/04/19 1034 ED Actual       Physical Exam   Constitutional: He is oriented to person, place, and time. He appears well-developed and well-nourished.   HENT:   Head: Normocephalic and atraumatic.   Right Ear: External ear normal.   Left Ear: External ear normal.   Nose: Nose normal.   Mouth/Throat: Oropharynx is clear and moist. Mucous membranes are dry.   Eyes: EOM are normal. Pupils are equal, round, and reactive to light.   Neck: Normal range of motion. Neck supple.   Cardiovascular: Regular rhythm, normal heart sounds and intact distal pulses. Tachycardia present.   Pulmonary/Chest: Effort normal and breath sounds normal. No respiratory distress. He exhibits no tenderness.   Abdominal: Soft. Bowel sounds are normal. He exhibits no distension and no mass. There is no tenderness. There is no rebound and no guarding.   Musculoskeletal: Normal range of motion. He exhibits no edema or tenderness.   Lymphadenopathy:     He has no cervical adenopathy.   Neurological: He is alert and oriented to person, place, and time. He has normal strength. No cranial nerve deficit or sensory deficit. Coordination normal. GCS eye subscore is 4. GCS verbal subscore is 5. GCS motor subscore is 6.   Reflex Scores:       Patellar reflexes are 2+ on the right side and 2+ on the left side.  Skin: Skin is warm and dry. No rash noted. No erythema.   Psychiatric: He has a normal mood and affect. His behavior is normal.   Nursing note and vitals reviewed.      ED Course     Procedures    Lab Results     Results for orders placed or performed during the hospital encounter of  04/04/19   Comprehensive Metabolic Panel   Result Value Ref Range    Sodium 138 135 - 145 mmol/L    Potassium 4.5 3.4 - 5.1 mmol/L    Chloride 105 98 - 107 mmol/L    Carbon Dioxide 25 21 - 32 mmol/L    Anion Gap 12 10 - 20 mmol/L    Glucose 89 65 - 99 mg/dL    BUN 12 5 - 18 mg/dL    Creatinine 0.60 0.38 - 1.15 mg/dL    GFR Estimate,  Not calculated.     GFR Estimate, Non  Not calculated.     BUN/Creatinine Ratio 20 7 - 25    CALCIUM 9.3 8.0 - 11.0 mg/dL    TOTAL BILIRUBIN 0.7 0.2 - 1.0 mg/dL    AST/SGOT 32 10 - 45 Units/L    ALT/SGPT 19 10 - 50 Units/L    ALK PHOSPHATASE 244 170 - 420 Units/L    TOTAL PROTEIN 8.0 6.0 - 8.0 g/dL    Albumin 4.0 3.6 - 5.1 g/dL    GLOBULIN 4.0 2.0 - 4.0 g/dL    A/G Ratio, Serum 1.0 1.0 - 2.4   Influenza Rapid A/B   Result Value Ref Range    SOURCE NASOPHARYNGEAL SWAB     INFLUENZA A POSITIVE (A) NEGATIVE    INFLUENZA B ANTIGEN NEGATIVE NEGATIVE   CBC & Auto Differential   Result Value Ref Range    WBC 5.1 4.2 - 11.0 K/mcL    RBC 5.38 (H) 3.90 - 5.30 mil/mcL    HGB 15.4 13.0 - 17.0 g/dL    HCT 45.6 39.0 - 51.0 %    MCV 84.8 78.0 - 100.0 fl    MCH 28.6 26.0 - 34.0 pg    MCHC 33.8 32.0 - 36.5 g/dL    RDW-CV 13.4 11.0 - 15.0 %     140 - 450 K/mcL    NRBC 0 0 /100 WBC    DIFF TYPE AUTOMATED DIFFERENTIAL     Neutrophil 65 %    LYMPH 16 %    MONO 18 %    EOSIN 0 %    BASO 1 %    Percent Immature Granuloctyes 0 %    Absolute Neutrophil 3.3 1.8 - 8.0 K/mcL    Absolute Lymph 0.8 (L) 1.5 - 6.5 K/mcL    Absolute Mono 0.9 (H) 0.0 - 0.8 K/mcL    Absolute Eos 0.0 (L) 0.1 - 0.7 K/mcL    Absolute Baso 0.1 0.0 - 0.2 K/mcL    Absolute Immature Granulocytes 0.0 0 - 0.2 K/mcl   POCT Rapid strep A   Result Value Ref Range    GRP A STREP Negative Negative    Internal Procedural Controls Acceptable Yes        EKG Results     Radiology Results     Imaging Results          XR Chest PA and Lateral (Final result)  Result time 04/04/19 13:55:36    Final result                  ASSESSMENT AND PLAN:    1. ALDA on CKD   Patient renal function progressively worsened,   Avoid Nephrotoxic Meds/ Agents such as NSAIDs, Gadolinium contrast, Phosphate containing enemas, etc.)  Adjust Medications according to eGFR  Keep patient euvolemic and renal diet  -avf plan tomorrow, Left arm precautions, pt will be NPO after midnight    -may need hd if pt develops uremia ,severe acidosis, hyperkalemia or fluid overload      2. Anemia due to CKD  On Epogen but hold if bp is high  -f/u Hb daily  -will transfuse 1 PRBC  -check iron sat  -stool for ob positive     3. Hypertension on procardia   not well controlled  -please titrate bp meds to keep bp<140/80  -low salt diet    4. 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                       Narrative:          EXAM: XR CHEST PA AND LATERAL    CLINICAL INDICATION: cough    FINDINGS:    There is no acute infiltrate or suspicious mass or edema. The heart and  mediastinum are unremarkable. There is no acute bony abnormality.                                        ED Medication Orders (From admission, onward)    Start Ordered     Status Ordering Provider    04/04/19 1620 04/04/19 1619  sodium chloride (NORMAL SALINE) 0.9 % bolus 1,000 mL  ONCE      Last MAR action:  New Bag BHARGAV SMILEY    04/04/19 1246 04/04/19 1245  sodium chloride (NORMAL SALINE) 0.9 % bolus 1,000 mL  ONCE      Last MAR action:  Completed BHARGAV SMILEY               [facility]  Vitals  Vitals:    04/04/19 1525 04/04/19 1536 04/04/19 1538 04/04/19 1634   BP: 117/60 111/61 112/56 116/63   Pulse: 105 110 115 105   Resp: 18   16   Temp:       TempSrc:       SpO2: 99% 99% 98% 98%   Weight:       Height:           ED Course  2:53 PM I rechecked the pt who reports that he feels somewhat improved at this time. I updated the pt and his mother that the strep test was negative. The CXR was negative for any acute cardiopulmonary process. His sx are likely due to the flu or other virus. He should take tylenol and ibuprofen for pain. I advised him to remain hydrated. He should f/u with his PCP within 1 week and return to the ED with any new or worsening symptoms. The pt and his mother understand and agree with the plan. All questions were addressed.    3:40 PM The pt's nurse informed me that the pt felt unsteady when he stood. His orthostatics are stable.     3:45 PM I rechecked the pt who reports that he does not feel lightheadedness at this time. I updated the pt that standing up too quickly can cause lightheadedness, especially if the pt has not eaten today.  I will order more IVF. He denies a HA or neck pain. The pt's mother understands and agrees with the plan. All  questions were addressed.    5:03 PM I rechecked the pt who reports that he feels somewhat improved at this time. He just ate some food. I updated the pt that his flu swab is positive for Influenza A. His mother reports that his sx began yesterday. I discussed that I could prescribe Tamiflu, which would lessen the severity and shorten to course of his sx. Once the pt's fluids are improved, if he is able to ambulate, he does not require care beyond the ED. His mother also states that he has had mild nose bleeds recently. I will prescribe a nasal spray. The pt's mother understands and agrees with the plan. All questions were addressed.    [facility]  Critical Care time spent on this patient outside of billable procedures:  None    Clinical Impression  ED Diagnosis        Final diagnosis    Viral syndrome                The patient's mother was provided with a recommendation to follow up with a primary care provider.    Follow Up:  [facility]  [address] [place] [id number]  [phone]    In 1 week            Summary of your Discharge Medications      Take these Medications      Details   ibuprofen 600 MG tablet  Commonly known as:  MOTRIN   Take 1 tablet by mouth every 6 hours as needed for Pain.            Pt is discharged to home/guardian care in stable condition.    ____________________________________________________________________    I have reviewed the information recorded by the scribe for accuracy and agree with its contents.    Alexandra Arias acting as a scribe for Dr. Joselo Vee.    Dr. Joselo Vee  Dictation #[id number]  Scribe: Alexandra Vee MD  04/04/19 9416       Joselo Vee MD  04/04/19 3016

## 2019-04-12 ENCOUNTER — APPOINTMENT (OUTPATIENT)
Dept: VASCULAR SURGERY | Facility: CLINIC | Age: 59
End: 2019-04-12
Payer: MEDICAID

## 2019-04-12 PROCEDURE — 93990 DOPPLER FLOW TESTING: CPT

## 2019-04-12 PROCEDURE — 99214 OFFICE O/P EST MOD 30 MIN: CPT

## 2019-04-12 NOTE — ASSESSMENT
[FreeTextEntry1] : Patient with renal failure on hemodialysis. Right brachiocephalic fistula has severe stenosis at the anastomosis. Plan for fistulogram and angioplasty

## 2019-04-12 NOTE — PHYSICAL EXAM
[Thrill] : thrill [Aneurysm] : no aneurysm [Normal] : normoactive bowel sounds, soft and nontender, no hepatosplenomegaly or masses appreciated [Bleeding] : no bleeding

## 2019-04-30 ENCOUNTER — FORM ENCOUNTER (OUTPATIENT)
Age: 59
End: 2019-04-30

## 2019-04-30 NOTE — PAST MEDICAL HISTORY
[Increasing age ( >40 years old)] : Increasing age ( >40 years old) [No therapy indicated for cases scheduled for less than one hour] : No therapy indicated for cases scheduled for less than one hour.

## 2019-05-01 ENCOUNTER — APPOINTMENT (OUTPATIENT)
Dept: ENDOVASCULAR SURGERY | Facility: CLINIC | Age: 59
End: 2019-05-01
Payer: MEDICAID

## 2019-05-01 VITALS
OXYGEN SATURATION: 94 % | HEIGHT: 63 IN | SYSTOLIC BLOOD PRESSURE: 121 MMHG | HEART RATE: 97 BPM | TEMPERATURE: 99 F | DIASTOLIC BLOOD PRESSURE: 76 MMHG

## 2019-05-01 PROCEDURE — 36215Z: CUSTOM | Mod: 59

## 2019-05-01 PROCEDURE — 36902Z: CUSTOM

## 2019-05-01 NOTE — DISCUSSION/SUMMARY
[Post Fistulogram/Intervention] : Post Fistulogram/Intervention: Site check for bleeding/hematoma, thrill/bruit. Vitals signs: On admission, then q15 mins x 2 [Resume diet] : resume diet [Other: _____] : [unfilled] [FSBS] : FSBS

## 2019-05-01 NOTE — REASON FOR VISIT
[Other ___] : a [unfilled] visit for [Formal Caregiver] : formal caregiver [FreeTextEntry2] : stenosis on duplex

## 2019-05-01 NOTE — HISTORY OF PRESENT ILLNESS
[] : right radiocephalic fistula [FreeTextEntry4] : yesterday [FreeTextEntry1] : creation 11-18-18 Dr Mason [FreeTextEntry5] : last night [FreeTextEntry6] : Dr Faith

## 2019-06-16 ENCOUNTER — INPATIENT (INPATIENT)
Facility: HOSPITAL | Age: 59
LOS: 1 days | Discharge: ANOTHER HOSPITAL | DRG: 73 | End: 2019-06-18
Attending: INTERNAL MEDICINE | Admitting: INTERNAL MEDICINE
Payer: MEDICAID

## 2019-06-16 VITALS
TEMPERATURE: 99 F | WEIGHT: 115.08 LBS | DIASTOLIC BLOOD PRESSURE: 87 MMHG | RESPIRATION RATE: 17 BRPM | SYSTOLIC BLOOD PRESSURE: 141 MMHG | OXYGEN SATURATION: 92 % | HEART RATE: 117 BPM

## 2019-06-16 DIAGNOSIS — Z89.512 ACQUIRED ABSENCE OF LEFT LEG BELOW KNEE: Chronic | ICD-10-CM

## 2019-06-16 DIAGNOSIS — R11.10 VOMITING, UNSPECIFIED: ICD-10-CM

## 2019-06-16 DIAGNOSIS — N18.6 END STAGE RENAL DISEASE: ICD-10-CM

## 2019-06-16 DIAGNOSIS — R11.2 NAUSEA WITH VOMITING, UNSPECIFIED: ICD-10-CM

## 2019-06-16 DIAGNOSIS — Z98.41 CATARACT EXTRACTION STATUS, RIGHT EYE: Chronic | ICD-10-CM

## 2019-06-16 DIAGNOSIS — E11.9 TYPE 2 DIABETES MELLITUS WITHOUT COMPLICATIONS: ICD-10-CM

## 2019-06-16 DIAGNOSIS — Z98.42 CATARACT EXTRACTION STATUS, LEFT EYE: Chronic | ICD-10-CM

## 2019-06-16 LAB
ALBUMIN SERPL ELPH-MCNC: 3.6 G/DL — SIGNIFICANT CHANGE UP (ref 3.5–5)
ALP SERPL-CCNC: 113 U/L — SIGNIFICANT CHANGE UP (ref 40–120)
ALT FLD-CCNC: 20 U/L DA — SIGNIFICANT CHANGE UP (ref 10–60)
ANION GAP SERPL CALC-SCNC: 10 MMOL/L — SIGNIFICANT CHANGE UP (ref 5–17)
AST SERPL-CCNC: 12 U/L — SIGNIFICANT CHANGE UP (ref 10–40)
BASOPHILS # BLD AUTO: 0.01 K/UL — SIGNIFICANT CHANGE UP (ref 0–0.2)
BASOPHILS NFR BLD AUTO: 0.2 % — SIGNIFICANT CHANGE UP (ref 0–2)
BILIRUB SERPL-MCNC: 0.7 MG/DL — SIGNIFICANT CHANGE UP (ref 0.2–1.2)
BUN SERPL-MCNC: 43 MG/DL — HIGH (ref 7–18)
CALCIUM SERPL-MCNC: 8.3 MG/DL — LOW (ref 8.4–10.5)
CHLORIDE SERPL-SCNC: 101 MMOL/L — SIGNIFICANT CHANGE UP (ref 96–108)
CO2 SERPL-SCNC: 28 MMOL/L — SIGNIFICANT CHANGE UP (ref 22–31)
CREAT SERPL-MCNC: 7.59 MG/DL — HIGH (ref 0.5–1.3)
EOSINOPHIL # BLD AUTO: 0.1 K/UL — SIGNIFICANT CHANGE UP (ref 0–0.5)
EOSINOPHIL NFR BLD AUTO: 1.8 % — SIGNIFICANT CHANGE UP (ref 0–6)
GLUCOSE SERPL-MCNC: 186 MG/DL — HIGH (ref 70–99)
HCT VFR BLD CALC: 37.1 % — LOW (ref 39–50)
HGB BLD-MCNC: 12 G/DL — LOW (ref 13–17)
IMM GRANULOCYTES NFR BLD AUTO: 0.2 % — SIGNIFICANT CHANGE UP (ref 0–1.5)
LIDOCAIN IGE QN: 88 U/L — SIGNIFICANT CHANGE UP (ref 73–393)
LYMPHOCYTES # BLD AUTO: 0.42 K/UL — LOW (ref 1–3.3)
LYMPHOCYTES # BLD AUTO: 7.4 % — LOW (ref 13–44)
MAGNESIUM SERPL-MCNC: 2.5 MG/DL — SIGNIFICANT CHANGE UP (ref 1.6–2.6)
MCHC RBC-ENTMCNC: 32.1 PG — SIGNIFICANT CHANGE UP (ref 27–34)
MCHC RBC-ENTMCNC: 32.3 GM/DL — SIGNIFICANT CHANGE UP (ref 32–36)
MCV RBC AUTO: 99.2 FL — SIGNIFICANT CHANGE UP (ref 80–100)
MONOCYTES # BLD AUTO: 0.28 K/UL — SIGNIFICANT CHANGE UP (ref 0–0.9)
MONOCYTES NFR BLD AUTO: 4.9 % — SIGNIFICANT CHANGE UP (ref 2–14)
NEUTROPHILS # BLD AUTO: 4.85 K/UL — SIGNIFICANT CHANGE UP (ref 1.8–7.4)
NEUTROPHILS NFR BLD AUTO: 85.5 % — HIGH (ref 43–77)
NRBC # BLD: 0 /100 WBCS — SIGNIFICANT CHANGE UP (ref 0–0)
PLATELET # BLD AUTO: 175 K/UL — SIGNIFICANT CHANGE UP (ref 150–400)
POTASSIUM SERPL-MCNC: 3.5 MMOL/L — SIGNIFICANT CHANGE UP (ref 3.5–5.3)
POTASSIUM SERPL-SCNC: 3.5 MMOL/L — SIGNIFICANT CHANGE UP (ref 3.5–5.3)
PROT SERPL-MCNC: 7.1 G/DL — SIGNIFICANT CHANGE UP (ref 6–8.3)
RBC # BLD: 3.74 M/UL — LOW (ref 4.2–5.8)
RBC # FLD: 16.1 % — HIGH (ref 10.3–14.5)
SODIUM SERPL-SCNC: 139 MMOL/L — SIGNIFICANT CHANGE UP (ref 135–145)
TROPONIN I SERPL-MCNC: 0.03 NG/ML — SIGNIFICANT CHANGE UP (ref 0–0.04)
WBC # BLD: 5.67 K/UL — SIGNIFICANT CHANGE UP (ref 3.8–10.5)
WBC # FLD AUTO: 5.67 K/UL — SIGNIFICANT CHANGE UP (ref 3.8–10.5)

## 2019-06-16 PROCEDURE — 99285 EMERGENCY DEPT VISIT HI MDM: CPT

## 2019-06-16 PROCEDURE — 71045 X-RAY EXAM CHEST 1 VIEW: CPT | Mod: 26

## 2019-06-16 RX ORDER — DIPHENHYDRAMINE HCL 50 MG
25 CAPSULE ORAL AT BEDTIME
Refills: 0 | Status: DISCONTINUED | OUTPATIENT
Start: 2019-06-16 | End: 2019-06-18

## 2019-06-16 RX ORDER — METOCLOPRAMIDE HCL 10 MG
10 TABLET ORAL EVERY 8 HOURS
Refills: 0 | Status: DISCONTINUED | OUTPATIENT
Start: 2019-06-16 | End: 2019-06-18

## 2019-06-16 RX ORDER — NIFEDIPINE 30 MG
30 TABLET, EXTENDED RELEASE 24 HR ORAL DAILY
Refills: 0 | Status: DISCONTINUED | OUTPATIENT
Start: 2019-06-16 | End: 2019-06-18

## 2019-06-16 RX ORDER — HEPARIN SODIUM 5000 [USP'U]/ML
5000 INJECTION INTRAVENOUS; SUBCUTANEOUS EVERY 12 HOURS
Refills: 0 | Status: DISCONTINUED | OUTPATIENT
Start: 2019-06-16 | End: 2019-06-18

## 2019-06-16 RX ORDER — METOCLOPRAMIDE HCL 10 MG
10 TABLET ORAL ONCE
Refills: 0 | Status: COMPLETED | OUTPATIENT
Start: 2019-06-16 | End: 2019-06-16

## 2019-06-16 RX ORDER — SIMVASTATIN 20 MG/1
40 TABLET, FILM COATED ORAL AT BEDTIME
Refills: 0 | Status: DISCONTINUED | OUTPATIENT
Start: 2019-06-16 | End: 2019-06-18

## 2019-06-16 RX ORDER — ONDANSETRON 8 MG/1
4 TABLET, FILM COATED ORAL ONCE
Refills: 0 | Status: COMPLETED | OUTPATIENT
Start: 2019-06-16 | End: 2019-06-16

## 2019-06-16 RX ORDER — GABAPENTIN 400 MG/1
200 CAPSULE ORAL DAILY
Refills: 0 | Status: DISCONTINUED | OUTPATIENT
Start: 2019-06-16 | End: 2019-06-18

## 2019-06-16 RX ORDER — PREGABALIN 225 MG/1
1000 CAPSULE ORAL DAILY
Refills: 0 | Status: DISCONTINUED | OUTPATIENT
Start: 2019-06-16 | End: 2019-06-18

## 2019-06-16 RX ORDER — FLUDROCORTISONE ACETATE 0.1 MG/1
0.1 TABLET ORAL
Refills: 0 | Status: DISCONTINUED | OUTPATIENT
Start: 2019-06-16 | End: 2019-06-18

## 2019-06-16 RX ORDER — FERROUS SULFATE 325(65) MG
325 TABLET ORAL DAILY
Refills: 0 | Status: DISCONTINUED | OUTPATIENT
Start: 2019-06-16 | End: 2019-06-18

## 2019-06-16 RX ORDER — FOLIC ACID 0.8 MG
1 TABLET ORAL DAILY
Refills: 0 | Status: DISCONTINUED | OUTPATIENT
Start: 2019-06-16 | End: 2019-06-18

## 2019-06-16 RX ORDER — CHOLECALCIFEROL (VITAMIN D3) 125 MCG
1000 CAPSULE ORAL DAILY
Refills: 0 | Status: DISCONTINUED | OUTPATIENT
Start: 2019-06-16 | End: 2019-06-18

## 2019-06-16 RX ORDER — SODIUM BICARBONATE 1 MEQ/ML
1300 SYRINGE (ML) INTRAVENOUS THREE TIMES A DAY
Refills: 0 | Status: DISCONTINUED | OUTPATIENT
Start: 2019-06-16 | End: 2019-06-17

## 2019-06-16 RX ORDER — SODIUM CHLORIDE 9 MG/ML
250 INJECTION INTRAMUSCULAR; INTRAVENOUS; SUBCUTANEOUS ONCE
Refills: 0 | Status: COMPLETED | OUTPATIENT
Start: 2019-06-16 | End: 2019-06-16

## 2019-06-16 RX ORDER — ASPIRIN/CALCIUM CARB/MAGNESIUM 324 MG
81 TABLET ORAL DAILY
Refills: 0 | Status: DISCONTINUED | OUTPATIENT
Start: 2019-06-16 | End: 2019-06-18

## 2019-06-16 RX ORDER — OXYCODONE AND ACETAMINOPHEN 5; 325 MG/1; MG/1
1 TABLET ORAL EVERY 6 HOURS
Refills: 0 | Status: DISCONTINUED | OUTPATIENT
Start: 2019-06-16 | End: 2019-06-18

## 2019-06-16 RX ADMIN — SODIUM CHLORIDE 250 MILLILITER(S): 9 INJECTION INTRAMUSCULAR; INTRAVENOUS; SUBCUTANEOUS at 16:41

## 2019-06-16 RX ADMIN — Medication 10 MILLIGRAM(S): at 16:06

## 2019-06-16 RX ADMIN — Medication 1 DROP(S): at 22:20

## 2019-06-16 RX ADMIN — FLUDROCORTISONE ACETATE 0.1 MILLIGRAM(S): 0.1 TABLET ORAL at 20:13

## 2019-06-16 RX ADMIN — ONDANSETRON 4 MILLIGRAM(S): 8 TABLET, FILM COATED ORAL at 16:05

## 2019-06-16 RX ADMIN — Medication 10 MILLIGRAM(S): at 22:23

## 2019-06-16 RX ADMIN — SODIUM CHLORIDE 250 MILLILITER(S): 9 INJECTION INTRAMUSCULAR; INTRAVENOUS; SUBCUTANEOUS at 16:04

## 2019-06-16 RX ADMIN — GABAPENTIN 200 MILLIGRAM(S): 400 CAPSULE ORAL at 22:22

## 2019-06-16 RX ADMIN — Medication 25 MILLIGRAM(S): at 22:21

## 2019-06-16 RX ADMIN — SIMVASTATIN 40 MILLIGRAM(S): 20 TABLET, FILM COATED ORAL at 22:21

## 2019-06-16 RX ADMIN — Medication 1300 MILLIGRAM(S): at 22:21

## 2019-06-16 RX ADMIN — SODIUM CHLORIDE 250 MILLILITER(S): 9 INJECTION INTRAMUSCULAR; INTRAVENOUS; SUBCUTANEOUS at 17:19

## 2019-06-16 NOTE — H&P ADULT - PROBLEM SELECTOR PLAN 6
-Pt on 70/30 at home  -will just start sliding scale as pt cannot tolerate PO well   -Diabetic Diet   -f/u HbA1c -Anemia with baseline 8.0  -2/2 chronic renal disease  -c/w epo therapy during HD

## 2019-06-16 NOTE — H&P ADULT - NSICDXPASTMEDICALHX_GEN_ALL_CORE_FT
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    Coronary artery disease     Diabetes mellitus     Diabetic neuropathy     Glaucoma     HLD (hyperlipidemia)     Hyperparathyroidism     Hypertension     Osteoarthritis     Osteoporosis     Peripheral vascular disease     Spinal stenosis of lumbosacral region     Vision loss of left eye

## 2019-06-16 NOTE — H&P ADULT - NSHPPHYSICALEXAM_GEN_ALL_CORE
Vital Signs Last 24 Hrs  T(C): 37.1 (16 Jun 2019 15:34), Max: 37.1 (16 Jun 2019 15:34)  T(F): 98.7 (16 Jun 2019 15:34), Max: 98.7 (16 Jun 2019 15:34)  HR: 117 (16 Jun 2019 15:34) (117 - 117)  BP: 141/87 (16 Jun 2019 15:34) (141/87 - 141/87)  RR: 17 (16 Jun 2019 15:34) (17 - 17)  SpO2: 92% (16 Jun 2019 15:34) (92% - 92%)    General - NAD, sitting up in bed, well groomed  Eyes - PERRLA, EOM intact  ENT - Nonicteric sclerae, PERRLA, EOMI. Oropharynx clear. Moist mucous membranes. Conjunctivae appear well perfused.   Neck - No noticeable or palpable swelling, redness or rash around throat or on face  Lymph Nodes - No lymphadenopathy  Cardiovascular - RRR no m/r/g, no JVD, no carotid bruits  Lungs - Clear to ascultation, no use of accessory muscles, no crackles or wheezes.  Skin - No rashes, skin warm and dry, no erythematous areas  Abdomen - Normal bowel sounds, abdomen soft and nontender  Extremities - No edema, cyanosis or clubbing Left Below the knee amputation  Neurological – Alert and oriented x 3, CN 2-12 grossly intact.

## 2019-06-16 NOTE — H&P ADULT - PROBLEM SELECTOR PLAN 8
-c/w with Statin therapy  -Check Lipid Panel in AM  -Continue with Low Fat diet -s/p Lt BKA  -c/w asa therapy

## 2019-06-16 NOTE — H&P ADULT - PROBLEM SELECTOR PLAN 4
Dr King Consulted -ESRD on HD  -Will plan for HD in   -Nephro: Dr. King -ESRD on HD at [facility] T, Th, Sa w/ Rt AV fistula   -Will plan for HD in AM  -Nephro: Dr. King/ [place]

## 2019-06-16 NOTE — ED PROVIDER NOTE - CLINICAL SUMMARY MEDICAL DECISION MAKING FREE TEXT BOX
57 y/o M presents with nausea and vomiting, missed dialysis. Will obtain cardiac labs, gentle hydration and give antiemetics.

## 2019-06-16 NOTE — ED ADULT NURSE NOTE - NSIMPLEMENTINTERV_GEN_ALL_ED
Implemented All Universal Safety Interventions:  [place] to call system. Call bell, personal items and telephone within reach. Instruct patient to call for assistance. Room bathroom lighting operational. Non-slip footwear when patient is off stretcher. Physically safe environment: no spills, clutter or unnecessary equipment. Stretcher in lowest position, wheels locked, appropriate side rails in place.

## 2019-06-16 NOTE — H&P ADULT - PROBLEM SELECTOR PLAN 1
-Intractable nausea and vomiting x 3 days likely 2/2 diabetic gastroparesis  -No abd pain on exam  -Lipase WNL  -Will start IV Reglan for now w/ zofran therapy   -advance det slowly   -f/u HbA1c  -Do not over hydrate as pt recently missed HD -Intractable nausea and vomiting x 3 days likely 2/2 diabetic gastroparesis vs gastroparesis   -No abd pain on exam  -Lipase WNL  -Will start IV Reglan for now w/ zofran therapy   -advance det slowly   -f/u HbA1c  -Do not over hydrate as pt recently missed HD -Intractable nausea and vomiting x 3 days likely 2/2 diabetic gastroparesis vs gastroenteritis   -No abd pain on exam  -Lipase WNL  -Will start IV Reglan for now w/ zofran therapy   -advance det slowly   -f/u HbA1c  -Do not over hydrate as pt recently missed HD

## 2019-06-16 NOTE — ED PROVIDER NOTE - PHYSICAL EXAMINATION
Patient actively dry heaving with small amount of emesis in bag with him.     Patient with right AV fistula on RUE, good thrill.    Patient with left below knee amputation.

## 2019-06-16 NOTE — H&P ADULT - HISTORY OF PRESENT ILLNESS
Pt is a 58M, from [facility], w/ PMHx of DM, partial vision loss, and ESRD on HD T, Th, Sa who presents with nausea and vomiting starting 3 days prior post HD.  Pt states after HD on Thursday he became acutely nauseated, and had 3 episodes of NBNB vomiting.  Pt denies developing abd pain.  Pt states the nausea and vomiting persisted over the last 2 days.  Pt was so nauseated he could not tolerate HD yesterday, and was sent to the ED with concern for HD, and electrolyte imbalance in the setting of intractable vomiting.  Pt also reports chronic non-productive cough, but does not know how long it has been going on.  Pt denies CP, palpitations, HA, dizziness, blurred vision, slurred speech, numbness/ weakness, fever, or syncope.     In the ED, pt presents in no acute distress, vitals WNL, and EKG sinus tach @ 116

## 2019-06-16 NOTE — ED PROVIDER NOTE - OBJECTIVE STATEMENT
59 y/o M with an extensive PMHx including DM and ESRD, on dialysis Tuesday, Thursday and Saturday, presents from [facility] to the ED with complaints of nausea and vomiting x 3days. Patient reports missing dialysis on Saturday due to nausea and vomiting. Patient reports nausea and vomiting not associated with abdominal pain, chest pain or shortness of breath. Denies taking medications prior to arrival or any other acute complaints.

## 2019-06-16 NOTE — H&P ADULT - ASSESSMENT
Pt is a 58M, from [facility], w/ PMHx of DM, partial vision loss, and ESRD on HD T, Th, Sa who presents with nausea and vomiting starting 3 days prior post HD.  In the ED, pt presents in no acute distress, vitals WNL, and EKG sinus tach @ 116.  Pt remains afebrile w/o leukocytosis.  Remaining labs WNL for patient.  CXR clear.      Pt will be admitted to medicine for intractable nausea and vomiting

## 2019-06-16 NOTE — ED PROVIDER NOTE - PROGRESS NOTE DETAILS
Nausea and vomiting improved. Discussed care with Dr. De La Torre. Will admit for further symptomatic control.

## 2019-06-16 NOTE — H&P ADULT - NSICDXPASTSURGICALHX_GEN_ALL_CORE_FT
PAST SURGICAL HISTORY:  Below knee amputation status, left     History of left cataract extraction     History of right cataract extraction

## 2019-06-16 NOTE — H&P ADULT - PROBLEM SELECTOR PLAN 9
-s/p Lt BKA  -c/w asa therapy [] Previous VTE                                                3  [] Thrombophilia                                             2  [] Lower limb paralysis                                   2    [] Current Cancer                                             2   [x] Immobilization > 24 hrs                              1  [] ICU/CCU stay > 24 hours                             1  [x] Age > 60                                                         1    IMPROVE VTE Score: 2; will start heparin subq for DVT prophy

## 2019-06-16 NOTE — H&P ADULT - NSICDXFAMILYHX_GEN_ALL_CORE_FT
FAMILY HISTORY:  Mother  Still living? No  Family history of cirrhosis of liver, Age at diagnosis: Age Unknown    Sibling  Still living? No  Asthma, Age at diagnosis: Age Unknown  Family history of AIDS, Age at diagnosis: Age Unknown

## 2019-06-16 NOTE — H&P ADULT - PROBLEM SELECTOR PLAN 10
[] Previous VTE                                                3  [] Thrombophilia                                             2  [] Lower limb paralysis                                   2    [] Current Cancer                                             2   [x] Immobilization > 24 hrs                              1  [] ICU/CCU stay > 24 hours                             1  [x] Age > 60                                                         1    IMPROVE VTE Score: 2; will start heparin subq for DVT prophy

## 2019-06-16 NOTE — H&P ADULT - PROBLEM SELECTOR PLAN 7
-Anemia with baseline 8.0  -2/2 chronic renal disease  -c/w epo therapy during HD -c/w with Statin therapy  -Check Lipid Panel in AM  -Continue with Low Fat diet

## 2019-06-16 NOTE — H&P ADULT - PROBLEM SELECTOR PLAN 5
-ESRD on HD  -Will plan for HD in   -Nephro: Dr. King -Pt on 70/30 at home  -will just start sliding scale as pt cannot tolerate PO well   -Diabetic Diet   -f/u HbA1c

## 2019-06-17 LAB
ANION GAP SERPL CALC-SCNC: 10 MMOL/L — SIGNIFICANT CHANGE UP (ref 5–17)
BUN SERPL-MCNC: 46 MG/DL — HIGH (ref 7–18)
CALCIUM SERPL-MCNC: 8 MG/DL — LOW (ref 8.4–10.5)
CHLORIDE SERPL-SCNC: 100 MMOL/L — SIGNIFICANT CHANGE UP (ref 96–108)
CHOLEST SERPL-MCNC: 128 MG/DL — SIGNIFICANT CHANGE UP (ref 10–199)
CO2 SERPL-SCNC: 32 MMOL/L — HIGH (ref 22–31)
CREAT SERPL-MCNC: 8.24 MG/DL — HIGH (ref 0.5–1.3)
GLUCOSE SERPL-MCNC: 102 MG/DL — HIGH (ref 70–99)
HBA1C BLD-MCNC: 8.1 % — HIGH (ref 4–5.6)
HCT VFR BLD CALC: 34.1 % — LOW (ref 39–50)
HCV AB S/CO SERPL IA: 0.05 S/CO — SIGNIFICANT CHANGE UP (ref 0–0.99)
HCV AB SERPL-IMP: SIGNIFICANT CHANGE UP
HDLC SERPL-MCNC: 41 MG/DL — SIGNIFICANT CHANGE UP
HGB BLD-MCNC: 10.8 G/DL — LOW (ref 13–17)
LIPID PNL WITH DIRECT LDL SERPL: 51 MG/DL — SIGNIFICANT CHANGE UP
MAGNESIUM SERPL-MCNC: 2.6 MG/DL — SIGNIFICANT CHANGE UP (ref 1.6–2.6)
MCHC RBC-ENTMCNC: 31.6 PG — SIGNIFICANT CHANGE UP (ref 27–34)
MCHC RBC-ENTMCNC: 31.7 GM/DL — LOW (ref 32–36)
MCV RBC AUTO: 99.7 FL — SIGNIFICANT CHANGE UP (ref 80–100)
NRBC # BLD: 0 /100 WBCS — SIGNIFICANT CHANGE UP (ref 0–0)
PHOSPHATE SERPL-MCNC: 4.3 MG/DL — SIGNIFICANT CHANGE UP (ref 2.5–4.5)
PLATELET # BLD AUTO: 165 K/UL — SIGNIFICANT CHANGE UP (ref 150–400)
POTASSIUM SERPL-MCNC: 2.6 MMOL/L — CRITICAL LOW (ref 3.5–5.3)
POTASSIUM SERPL-SCNC: 2.6 MMOL/L — CRITICAL LOW (ref 3.5–5.3)
RBC # BLD: 3.42 M/UL — LOW (ref 4.2–5.8)
RBC # FLD: 16.2 % — HIGH (ref 10.3–14.5)
SODIUM SERPL-SCNC: 142 MMOL/L — SIGNIFICANT CHANGE UP (ref 135–145)
TOTAL CHOLESTEROL/HDL RATIO MEASUREMENT: 3.1 RATIO — LOW (ref 3.4–9.6)
TRIGL SERPL-MCNC: 181 MG/DL — HIGH (ref 10–149)
TSH SERPL-MCNC: 1.22 UU/ML — SIGNIFICANT CHANGE UP (ref 0.34–4.82)
VIT B12 SERPL-MCNC: >2000 PG/ML — HIGH (ref 232–1245)
WBC # BLD: 3.6 K/UL — LOW (ref 3.8–10.5)
WBC # FLD AUTO: 3.6 K/UL — LOW (ref 3.8–10.5)

## 2019-06-17 RX ORDER — POTASSIUM CHLORIDE 20 MEQ
40 PACKET (EA) ORAL ONCE
Refills: 0 | Status: COMPLETED | OUTPATIENT
Start: 2019-06-17 | End: 2019-06-17

## 2019-06-17 RX ADMIN — Medication 1 DROP(S): at 11:16

## 2019-06-17 RX ADMIN — Medication 1300 MILLIGRAM(S): at 14:23

## 2019-06-17 RX ADMIN — Medication 325 MILLIGRAM(S): at 11:16

## 2019-06-17 RX ADMIN — Medication 30 MILLIGRAM(S): at 05:41

## 2019-06-17 RX ADMIN — Medication 1300 MILLIGRAM(S): at 05:41

## 2019-06-17 RX ADMIN — Medication 25 MILLIGRAM(S): at 22:21

## 2019-06-17 RX ADMIN — Medication 1000 UNIT(S): at 11:16

## 2019-06-17 RX ADMIN — Medication 1 DROP(S): at 05:41

## 2019-06-17 RX ADMIN — Medication 1 MILLIGRAM(S): at 11:16

## 2019-06-17 RX ADMIN — Medication 10 MILLIGRAM(S): at 08:11

## 2019-06-17 RX ADMIN — Medication 40 MILLIEQUIVALENT(S): at 07:39

## 2019-06-17 RX ADMIN — GABAPENTIN 200 MILLIGRAM(S): 400 CAPSULE ORAL at 11:16

## 2019-06-17 RX ADMIN — FLUDROCORTISONE ACETATE 0.1 MILLIGRAM(S): 0.1 TABLET ORAL at 22:21

## 2019-06-17 RX ADMIN — FLUDROCORTISONE ACETATE 0.1 MILLIGRAM(S): 0.1 TABLET ORAL at 05:41

## 2019-06-17 RX ADMIN — PREGABALIN 1000 MICROGRAM(S): 225 CAPSULE ORAL at 11:16

## 2019-06-17 RX ADMIN — Medication 81 MILLIGRAM(S): at 11:16

## 2019-06-17 RX ADMIN — SIMVASTATIN 40 MILLIGRAM(S): 20 TABLET, FILM COATED ORAL at 22:21

## 2019-06-17 NOTE — PROGRESS NOTE ADULT - PROBLEM SELECTOR PLAN 1
-Intractable nausea and vomiting x 3 days likely 2/2 diabetic gastroparesis vs gastroenteritis   -No abd pain on exam  -Lipase WNL  -Will start IV Reglan for now w/ zofran therapy   -advance det slowly   -HbA1c 8.1  -Do not over hydrate as pt recently missed HD  - will monitor today and advance diet when possible   - D/c in am if stable

## 2019-06-17 NOTE — PROGRESS NOTE ADULT - ASSESSMENT
Pt is a 58M, from [facility], w/ PMHx of DM, partial vision loss, and ESRD on HD T, Th, Sa who presents with nausea and vomiting starting 3 days prior post HD.  In the ED, pt presents in no acute distress, vitals WNL, and EKG sinus tach @ 116.  Pt remains afebrile w/o leukocytosis.  Remaining labs WNL for patient.  CXR clear.      Pt will be admitted to medicine for intractable nausea and vomiting

## 2019-06-17 NOTE — PROGRESS NOTE ADULT - PROBLEM SELECTOR PLAN 4
-ESRD on HD at [facility] T, Th, Sa w/ Rt AV fistula   [facility]  -f/u Dr. King for dialysis

## 2019-06-17 NOTE — PROGRESS NOTE ADULT - PROBLEM SELECTOR PLAN 5
-Pt on 70/30 at home  -will just start sliding scale as pt cannot tolerate PO well   -Diabetic Diet   -HbA1c 8.1

## 2019-06-17 NOTE — CONSULT NOTE ADULT - SUBJECTIVE AND OBJECTIVE BOX
[facility] : Progress Note :: [phone], (office [phone]),   Dr Mosher / Dr Washington / Dr Young / Dr Doll / Dr Varsha TURCIOS / Dr Tello / Dr Garcia / Dr Larry qiu  _____________________________________________________________________________________________  Patient is a 58y Male with ESRD on HD TTS at [facility] . Presented to ED with intractable nausea and vomitting. He could not tolerate HD on Saturday sec to vomiting. admitted with diabetic gastroparesis, symptoms improved with zofran and reglan. renal consulted as on dialysis.    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  Diabetic neuropathy  Diabetes mellitus  Hyperparathyroidism  Spinal stenosis of lumbosacral region  Peripheral vascular disease  HLD (hyperlipidemia)  Coronary artery disease  Glaucoma  Anemia  CKD (chronic kidney disease)  Adrenal insufficiency  Hypertension  History of left cataract extraction  History of right cataract extraction  Below knee amputation status, left    fish (Rash)  liver (Anaphylaxis)  No Known Drug Allergies    Home Medications Reviewed  Hospital Medications:   MEDICATIONS  (STANDING):  artificial  tears Solution 1 Drop(s) Both EYES four times a day  aspirin enteric coated 81 milliGRAM(s) Oral daily  cholecalciferol 1000 Unit(s) Oral daily  cyanocobalamin 1000 MICROGram(s) Oral daily  diphenhydrAMINE 25 milliGRAM(s) Oral at bedtime  ferrous    sulfate 325 milliGRAM(s) Oral daily  fludroCORTISONE 0.1 milliGRAM(s) Oral two times a day  folic acid 1 milliGRAM(s) Oral daily  gabapentin 200 milliGRAM(s) Oral daily  heparin  Injectable 5000 Unit(s) SubCutaneous every 12 hours  NIFEdipine XL 30 milliGRAM(s) Oral daily  simvastatin 40 milliGRAM(s) Oral at bedtime    SOCIAL HISTORY:  Denies ETOh,Smoking,   FAMILY HISTORY:  Family history of AIDS  Asthma  Family history of cirrhosis of liver        VITALS:  T(F): 99.1 (06-17-19 @ 16:05), Max: 99.2 (06-16-19 @ 21:55)  HR: 94 (06-17-19 @ 16:05)  BP: 140/78 (06-17-19 @ 16:05)  RR: 18 (06-17-19 @ 16:05)  SpO2: 98% (06-17-19 @ 15:16)  Wt(kg): --      PHYSICAL EXAM:  Constitutional: NAD  HEENT: anicteric sclera, oropharynx clear.  Neck: No JVD  Respiratory: CTAB, no wheezes, rales or rhonchi  Cardiovascular: S1, S2, RRR  Gastrointestinal: BS+, soft, NT/ND  Extremities:  No peripheral edema  Neurological: A/O x 3, no focal deficits  Psychiatric: Normal mood, normal affect  Vascular Access: RT AVF with thrill and bruit    LABS:  06-17    142  |  100  |  46<H>  ----------------------------<  102<H>  2.6<LL>   |  32<H>  |  8.24<H>    Ca    8.0<L>      17 Jun 2019 05:53  Phos  4.3     06-17  Mg     2.6     06-17    TPro  7.1  /  Alb  3.6  /  TBili  0.7  /  DBili      /  AST  12  /  ALT  20  /  AlkPhos  113  06-16    Creatinine Trend: 8.24 <--, 7.59 <--                        10.8   3.60  )-----------( 165      ( 17 Jun 2019 05:53 )             34.1     Urine Studies:      RADIOLOGY & ADDITIONAL STUDIES:

## 2019-06-17 NOTE — PROGRESS NOTE ADULT - SUBJECTIVE AND OBJECTIVE BOX
PGY 1 Note discussed with supervising resident and primary attending    Patient is a 58y old  Male who presents with a chief complaint of Abd pain (16 Jun 2019 18:27)      INTERVAL HPI/OVERNIGHT EVENTS: pt was admitted to the hospital because of nausea and vomiting, pt was maintained on clear liquid diet, pt was feeling fine today morning pt's diet was advanced but pt threw up after that so will continue with clear liquid diet for now.     MEDICATIONS  (STANDING):  artificial  tears Solution 1 Drop(s) Both EYES four times a day  aspirin enteric coated 81 milliGRAM(s) Oral daily  cholecalciferol 1000 Unit(s) Oral daily  cyanocobalamin 1000 MICROGram(s) Oral daily  diphenhydrAMINE 25 milliGRAM(s) Oral at bedtime  ferrous    sulfate 325 milliGRAM(s) Oral daily  fludroCORTISONE 0.1 milliGRAM(s) Oral two times a day  folic acid 1 milliGRAM(s) Oral daily  gabapentin 200 milliGRAM(s) Oral daily  heparin  Injectable 5000 Unit(s) SubCutaneous every 12 hours  NIFEdipine XL 30 milliGRAM(s) Oral daily  simvastatin 40 milliGRAM(s) Oral at bedtime  sodium bicarbonate 1300 milliGRAM(s) Oral three times a day    MEDICATIONS  (PRN):  metoclopramide Injectable 10 milliGRAM(s) IV Push every 8 hours PRN nausea/ vomiting  oxyCODONE    5 mG/acetaminophen 325 mG 1 Tablet(s) Oral every 6 hours PRN Moderate Pain (4 - 6)      __________________________________________________  REVIEW OF SYSTEMS:    CONSTITUTIONAL: No fever,   EYES: no acute visual disturbances  NECK: No pain or stiffness  RESPIRATORY: No cough; No shortness of breath  CARDIOVASCULAR: No chest pain, no palpitations  GASTROINTESTINAL: No pain. No nausea or vomiting; No diarrhea   NEUROLOGICAL: No headache or numbness, no tremors  MUSCULOSKELETAL: No joint pain, no muscle pain  GENITOURINARY: no dysuria, no frequency, no hesitancy  PSYCHIATRY: no depression , no anxiety  ALL OTHER  ROS negative        Vital Signs Last 24 Hrs  T(C): 36.6 (17 Jun 2019 05:20), Max: 37.3 (16 Jun 2019 21:55)  T(F): 97.8 (17 Jun 2019 05:20), Max: 99.2 (16 Jun 2019 21:55)  HR: 89 (17 Jun 2019 05:20) (89 - 117)  BP: 143/80 (17 Jun 2019 05:20) (133/78 - 162/82)  BP(mean): --  RR: 18 (17 Jun 2019 05:20) (17 - 18)  SpO2: 97% (17 Jun 2019 05:20) (92% - 97%)    ________________________________________________  PHYSICAL EXAM:  GENERAL: NAD  HEENT: Normocephalic;  conjunctivae and sclerae clear; moist mucous membranes;   NECK : supple  CHEST/LUNG: Clear to auscultation bilaterally with good air entry   HEART: S1 S2  regular; no murmurs, gallops or rubs  ABDOMEN: Soft, Nontender, Nondistended; Bowel sounds present  EXTREMITIES: no cyanosis; no edema; no calf tenderness  SKIN: warm and dry; no rash  NERVOUS SYSTEM:  Awake and alert; Oriented  to place, person and time ; no new deficits    _________________________________________________  LABS:                        10.8   3.60  )-----------( 165      ( 17 Jun 2019 05:53 )             34.1     06-17    142  |  100  |  46<H>  ----------------------------<  102<H>  2.6<LL>   |  32<H>  |  8.24<H>    Ca    8.0<L>      17 Jun 2019 05:53  Phos  4.3     06-17  Mg     2.6     06-17    TPro  7.1  /  Alb  3.6  /  TBili  0.7  /  DBili  x   /  AST  12  /  ALT  20  /  AlkPhos  113  06-16        CAPILLARY BLOOD GLUCOSE      POCT Blood Glucose.: 204 mg/dL (16 Jun 2019 15:41)        RADIOLOGY & ADDITIONAL TESTS:    Imaging Personally Reviewed:  YES    Consultant(s) Notes Reviewed:   YES    Care Discussed with Consultants : YES    Plan of care was discussed with patient and /or primary care giver; all questions and concerns were addressed and care was aligned with patient's wishes.

## 2019-06-17 NOTE — CONSULT NOTE ADULT - ASSESSMENT
# ESRD. plan for HD today  # HTN CONTROLLED  # anemia of CKD will get EPO with next HD  # hypokalemia sec to NV. Dialyse against 4.0 K+ bath  # diabetic gastroparesis. cont reglan and zofran  med reconcilliation. D/C NAHCO3    Many thanks for the consult

## 2019-06-18 ENCOUNTER — TRANSCRIPTION ENCOUNTER (OUTPATIENT)
Age: 59
End: 2019-06-18

## 2019-06-18 VITALS
RESPIRATION RATE: 16 BRPM | SYSTOLIC BLOOD PRESSURE: 124 MMHG | HEART RATE: 92 BPM | OXYGEN SATURATION: 96 % | WEIGHT: 107.59 LBS | DIASTOLIC BLOOD PRESSURE: 77 MMHG | TEMPERATURE: 99 F

## 2019-06-18 LAB
ANION GAP SERPL CALC-SCNC: 8 MMOL/L — SIGNIFICANT CHANGE UP (ref 5–17)
BUN SERPL-MCNC: 31 MG/DL — HIGH (ref 7–18)
CALCIUM SERPL-MCNC: 7.8 MG/DL — LOW (ref 8.4–10.5)
CHLORIDE SERPL-SCNC: 100 MMOL/L — SIGNIFICANT CHANGE UP (ref 96–108)
CO2 SERPL-SCNC: 32 MMOL/L — HIGH (ref 22–31)
CREAT SERPL-MCNC: 6.23 MG/DL — HIGH (ref 0.5–1.3)
GLUCOSE BLDC GLUCOMTR-MCNC: 410 MG/DL — HIGH (ref 70–99)
GLUCOSE BLDC GLUCOMTR-MCNC: 454 MG/DL — CRITICAL HIGH (ref 70–99)
GLUCOSE BLDC GLUCOMTR-MCNC: 482 MG/DL — CRITICAL HIGH (ref 70–99)
GLUCOSE SERPL-MCNC: 219 MG/DL — HIGH (ref 70–99)
HCT VFR BLD CALC: 34.3 % — LOW (ref 39–50)
HGB BLD-MCNC: 10.8 G/DL — LOW (ref 13–17)
MCHC RBC-ENTMCNC: 31.5 GM/DL — LOW (ref 32–36)
MCHC RBC-ENTMCNC: 32 PG — SIGNIFICANT CHANGE UP (ref 27–34)
MCV RBC AUTO: 101.5 FL — HIGH (ref 80–100)
NRBC # BLD: 0 /100 WBCS — SIGNIFICANT CHANGE UP (ref 0–0)
PLATELET # BLD AUTO: 155 K/UL — SIGNIFICANT CHANGE UP (ref 150–400)
POTASSIUM SERPL-MCNC: 3.4 MMOL/L — LOW (ref 3.5–5.3)
POTASSIUM SERPL-SCNC: 3.4 MMOL/L — LOW (ref 3.5–5.3)
RBC # BLD: 3.38 M/UL — LOW (ref 4.2–5.8)
RBC # FLD: 16.5 % — HIGH (ref 10.3–14.5)
SODIUM SERPL-SCNC: 140 MMOL/L — SIGNIFICANT CHANGE UP (ref 135–145)
WBC # BLD: 3.82 K/UL — SIGNIFICANT CHANGE UP (ref 3.8–10.5)
WBC # FLD AUTO: 3.82 K/UL — SIGNIFICANT CHANGE UP (ref 3.8–10.5)

## 2019-06-18 RX ORDER — DEXTROSE 50 % IN WATER 50 %
25 SYRINGE (ML) INTRAVENOUS ONCE
Refills: 0 | Status: DISCONTINUED | OUTPATIENT
Start: 2019-06-18 | End: 2019-06-18

## 2019-06-18 RX ORDER — METOCLOPRAMIDE HCL 10 MG
1 TABLET ORAL
Qty: 12 | Refills: 0
Start: 2019-06-18 | End: 2019-06-20

## 2019-06-18 RX ORDER — INSULIN LISPRO 100/ML
VIAL (ML) SUBCUTANEOUS
Refills: 0 | Status: DISCONTINUED | OUTPATIENT
Start: 2019-06-18 | End: 2019-06-18

## 2019-06-18 RX ORDER — NICOTINE POLACRILEX 2 MG
1 GUM BUCCAL DAILY
Refills: 0 | Status: DISCONTINUED | OUTPATIENT
Start: 2019-06-18 | End: 2019-06-18

## 2019-06-18 RX ORDER — DEXTROSE 50 % IN WATER 50 %
15 SYRINGE (ML) INTRAVENOUS ONCE
Refills: 0 | Status: DISCONTINUED | OUTPATIENT
Start: 2019-06-18 | End: 2019-06-18

## 2019-06-18 RX ORDER — GLUCAGON INJECTION, SOLUTION 0.5 MG/.1ML
1 INJECTION, SOLUTION SUBCUTANEOUS ONCE
Refills: 0 | Status: DISCONTINUED | OUTPATIENT
Start: 2019-06-18 | End: 2019-06-18

## 2019-06-18 RX ORDER — SODIUM CHLORIDE 9 MG/ML
1000 INJECTION, SOLUTION INTRAVENOUS
Refills: 0 | Status: DISCONTINUED | OUTPATIENT
Start: 2019-06-18 | End: 2019-06-18

## 2019-06-18 RX ORDER — DEXTROSE 50 % IN WATER 50 %
12.5 SYRINGE (ML) INTRAVENOUS ONCE
Refills: 0 | Status: DISCONTINUED | OUTPATIENT
Start: 2019-06-18 | End: 2019-06-18

## 2019-06-18 RX ORDER — NICOTINE POLACRILEX 2 MG
1 GUM BUCCAL
Qty: 0 | Refills: 0 | DISCHARGE
Start: 2019-06-18

## 2019-06-18 RX ORDER — SODIUM BICARBONATE 1 MEQ/ML
2 SYRINGE (ML) INTRAVENOUS
Qty: 0 | Refills: 0 | DISCHARGE

## 2019-06-18 RX ADMIN — Medication 81 MILLIGRAM(S): at 11:09

## 2019-06-18 RX ADMIN — PREGABALIN 1000 MICROGRAM(S): 225 CAPSULE ORAL at 11:09

## 2019-06-18 RX ADMIN — FLUDROCORTISONE ACETATE 0.1 MILLIGRAM(S): 0.1 TABLET ORAL at 05:58

## 2019-06-18 RX ADMIN — Medication 1 PATCH: at 11:10

## 2019-06-18 RX ADMIN — Medication 6: at 12:05

## 2019-06-18 RX ADMIN — Medication 1 DROP(S): at 11:09

## 2019-06-18 RX ADMIN — Medication 1 MILLIGRAM(S): at 11:09

## 2019-06-18 RX ADMIN — Medication 30 MILLIGRAM(S): at 05:58

## 2019-06-18 RX ADMIN — Medication 1000 UNIT(S): at 11:09

## 2019-06-18 RX ADMIN — Medication 325 MILLIGRAM(S): at 11:09

## 2019-06-18 RX ADMIN — GABAPENTIN 200 MILLIGRAM(S): 400 CAPSULE ORAL at 11:09

## 2019-06-18 NOTE — DISCHARGE NOTE NURSING/CASE MANAGEMENT/SOCIAL WORK - NSDCPEWEB_GEN_ALL_CORE
[facility] for Tobacco Control website --- [email or web address] website --- [email or web address]

## 2019-06-18 NOTE — DISCHARGE NOTE PROVIDER - HOSPITAL COURSE
HPI:    Pt is a 58M, from [facility], w/ PMHx of DM, partial vision loss, and ESRD on HD T, Th, Sa who presents with nausea and vomiting starting 3 days prior post HD.  Pt states after HD on Thursday he became acutely nauseated, and had 3 episodes of NBNB vomiting.  Pt denies developing abd pain.  Pt states the nausea and vomiting persisted over the last 2 days.  Pt was so nauseated he could not tolerate HD yesterday, and was sent to the ED with concern for HD, and electrolyte imbalance in the setting of intractable vomiting.  Pt also reports chronic non-productive cough, but does not know how long it has been going on.  Pt denies CP, palpitations, HA, dizziness, blurred vision, slurred speech, numbness/ weakness, fever, or syncope.         In the ED, pt presents in no acute distress, vitals WNL, and EKG sinus tach @ 116 (16 Jun 2019 18:27)        Pt was admitted for supportive treatment for nausea and vomiting likely 2/2 gastroparesis as pt has long standing hx of db and his hba1c is >8    pt was given reglan, electrolytes were replaced as needed and he was npo but diet was advanced as tolerated. Pt was also seen by nephrologist dr. Mosher he had dialysis on monday as he could not tolerate saturday dialysis. Pt is medically stable for discharge.

## 2019-06-18 NOTE — DISCHARGE NOTE PROVIDER - NSDCCPCAREPLAN_GEN_ALL_CORE_FT
PRINCIPAL DISCHARGE DIAGNOSIS  Diagnosis: Non-intractable vomiting with nausea, unspecified vomiting type  Assessment and Plan of Treatment: You were admitted to the [facility] managment of nausea and vomiting, likely from gastroparesis as you have long standing histroy of dianbetes and hba1c is 8, you were given anti nausea medication, and your diet was advanced as you tolerated. Please small and frequent meal to nausea and vomiting in the future. Please follow up with your primary care doctor and endocrinologist in a week.      SECONDARY DISCHARGE DIAGNOSES  Diagnosis: ESRD with anemia  Assessment and Plan of Treatment: Please conitnue with your dialysis regularly. Please follow up with your nephrologist in a week.    Diagnosis: Diabetes  Assessment and Plan of Treatment: Continue with your blood sugar medication. HbAIC was found 8.1 on admission.  You must maintain a healthy diet that consist of low sugar, low fat, low sodium diet. Exercise frequently if possible. Consider repeating your Hemoglobin A1c within 3 months after discharge to monitor your average blood glucose control. Follow up with primary care physician in one week after discharge.  Please follow up with your foot doctor every 6 months and with your eye doctor every 12 months.

## 2019-06-18 NOTE — DISCHARGE NOTE NURSING/CASE MANAGEMENT/SOCIAL WORK - NSDCDPATPORTLINK_GEN_ALL_CORE
You can access the [facility] Patient Portal, offered by [facility], by registering with the following website: [email or web address]

## 2019-07-10 PROCEDURE — 99285 EMERGENCY DEPT VISIT HI MDM: CPT | Mod: 25

## 2019-07-10 PROCEDURE — 99261: CPT

## 2019-07-10 PROCEDURE — 83036 HEMOGLOBIN GLYCOSYLATED A1C: CPT

## 2019-07-10 PROCEDURE — 84100 ASSAY OF PHOSPHORUS: CPT

## 2019-07-10 PROCEDURE — 80053 COMPREHEN METABOLIC PANEL: CPT

## 2019-07-10 PROCEDURE — 96375 TX/PRO/DX INJ NEW DRUG ADDON: CPT

## 2019-07-10 PROCEDURE — 93005 ELECTROCARDIOGRAM TRACING: CPT

## 2019-07-10 PROCEDURE — 82962 GLUCOSE BLOOD TEST: CPT

## 2019-07-10 PROCEDURE — 80048 BASIC METABOLIC PNL TOTAL CA: CPT

## 2019-07-10 PROCEDURE — 84443 ASSAY THYROID STIM HORMONE: CPT

## 2019-07-10 PROCEDURE — 83735 ASSAY OF MAGNESIUM: CPT

## 2019-07-10 PROCEDURE — 83690 ASSAY OF LIPASE: CPT

## 2019-07-10 PROCEDURE — 82607 VITAMIN B-12: CPT

## 2019-07-10 PROCEDURE — 86803 HEPATITIS C AB TEST: CPT

## 2019-07-10 PROCEDURE — 85027 COMPLETE CBC AUTOMATED: CPT

## 2019-07-10 PROCEDURE — 36415 COLL VENOUS BLD VENIPUNCTURE: CPT

## 2019-07-10 PROCEDURE — 84484 ASSAY OF TROPONIN QUANT: CPT

## 2019-07-10 PROCEDURE — 80061 LIPID PANEL: CPT

## 2019-07-10 PROCEDURE — 96374 THER/PROPH/DIAG INJ IV PUSH: CPT

## 2019-07-10 PROCEDURE — 71045 X-RAY EXAM CHEST 1 VIEW: CPT

## 2019-08-22 ENCOUNTER — INPATIENT (INPATIENT)
Facility: HOSPITAL | Age: 59
LOS: 7 days | Discharge: NURSING, REHAB OR LONG-TERM CARE | DRG: 871 | End: 2019-08-30
Attending: INTERNAL MEDICINE | Admitting: INTERNAL MEDICINE
Payer: MEDICAID

## 2019-08-22 VITALS
HEIGHT: 62 IN | SYSTOLIC BLOOD PRESSURE: 109 MMHG | TEMPERATURE: 101 F | HEART RATE: 110 BPM | DIASTOLIC BLOOD PRESSURE: 67 MMHG | OXYGEN SATURATION: 98 % | WEIGHT: 115.08 LBS | RESPIRATION RATE: 20 BRPM

## 2019-08-22 DIAGNOSIS — A41.9 SEPSIS, UNSPECIFIED ORGANISM: ICD-10-CM

## 2019-08-22 DIAGNOSIS — R50.9 FEVER, UNSPECIFIED: ICD-10-CM

## 2019-08-22 DIAGNOSIS — Z98.41 CATARACT EXTRACTION STATUS, RIGHT EYE: Chronic | ICD-10-CM

## 2019-08-22 DIAGNOSIS — I10 ESSENTIAL (PRIMARY) HYPERTENSION: ICD-10-CM

## 2019-08-22 DIAGNOSIS — Z29.9 ENCOUNTER FOR PROPHYLACTIC MEASURES, UNSPECIFIED: ICD-10-CM

## 2019-08-22 DIAGNOSIS — E78.5 HYPERLIPIDEMIA, UNSPECIFIED: ICD-10-CM

## 2019-08-22 DIAGNOSIS — E11.9 TYPE 2 DIABETES MELLITUS WITHOUT COMPLICATIONS: ICD-10-CM

## 2019-08-22 DIAGNOSIS — Z98.42 CATARACT EXTRACTION STATUS, LEFT EYE: Chronic | ICD-10-CM

## 2019-08-22 DIAGNOSIS — Z89.512 ACQUIRED ABSENCE OF LEFT LEG BELOW KNEE: Chronic | ICD-10-CM

## 2019-08-22 DIAGNOSIS — E27.40 UNSPECIFIED ADRENOCORTICAL INSUFFICIENCY: ICD-10-CM

## 2019-08-22 DIAGNOSIS — E11.40 TYPE 2 DIABETES MELLITUS WITH DIABETIC NEUROPATHY, UNSPECIFIED: ICD-10-CM

## 2019-08-22 DIAGNOSIS — N18.6 END STAGE RENAL DISEASE: ICD-10-CM

## 2019-08-22 LAB
ALBUMIN SERPL ELPH-MCNC: 3 G/DL — LOW (ref 3.5–5)
ALP SERPL-CCNC: 84 U/L — SIGNIFICANT CHANGE UP (ref 40–120)
ALT FLD-CCNC: 13 U/L DA — SIGNIFICANT CHANGE UP (ref 10–60)
ANION GAP SERPL CALC-SCNC: 8 MMOL/L — SIGNIFICANT CHANGE UP (ref 5–17)
APTT BLD: 32.5 SEC — SIGNIFICANT CHANGE UP (ref 27.5–36.3)
AST SERPL-CCNC: 20 U/L — SIGNIFICANT CHANGE UP (ref 10–40)
BASOPHILS # BLD AUTO: 0.05 K/UL — SIGNIFICANT CHANGE UP (ref 0–0.2)
BASOPHILS NFR BLD AUTO: 0.4 % — SIGNIFICANT CHANGE UP (ref 0–2)
BILIRUB SERPL-MCNC: 0.7 MG/DL — SIGNIFICANT CHANGE UP (ref 0.2–1.2)
BUN SERPL-MCNC: 13 MG/DL — SIGNIFICANT CHANGE UP (ref 7–18)
CALCIUM SERPL-MCNC: 8.5 MG/DL — SIGNIFICANT CHANGE UP (ref 8.4–10.5)
CHLORIDE SERPL-SCNC: 98 MMOL/L — SIGNIFICANT CHANGE UP (ref 96–108)
CO2 SERPL-SCNC: 31 MMOL/L — SIGNIFICANT CHANGE UP (ref 22–31)
CREAT SERPL-MCNC: 3.62 MG/DL — HIGH (ref 0.5–1.3)
EOSINOPHIL # BLD AUTO: 0.02 K/UL — SIGNIFICANT CHANGE UP (ref 0–0.5)
EOSINOPHIL NFR BLD AUTO: 0.2 % — SIGNIFICANT CHANGE UP (ref 0–6)
GLUCOSE BLDC GLUCOMTR-MCNC: 125 MG/DL — HIGH (ref 70–99)
GLUCOSE SERPL-MCNC: 76 MG/DL — SIGNIFICANT CHANGE UP (ref 70–99)
HCT VFR BLD CALC: 32.9 % — LOW (ref 39–50)
HGB BLD-MCNC: 10.6 G/DL — LOW (ref 13–17)
IMM GRANULOCYTES NFR BLD AUTO: 0.9 % — SIGNIFICANT CHANGE UP (ref 0–1.5)
INR BLD: 1.2 RATIO — HIGH (ref 0.88–1.16)
LACTATE SERPL-SCNC: 1.3 MMOL/L — SIGNIFICANT CHANGE UP (ref 0.7–2)
LYMPHOCYTES # BLD AUTO: 0.55 K/UL — LOW (ref 1–3.3)
LYMPHOCYTES # BLD AUTO: 4.4 % — LOW (ref 13–44)
MCHC RBC-ENTMCNC: 30.6 PG — SIGNIFICANT CHANGE UP (ref 27–34)
MCHC RBC-ENTMCNC: 32.2 GM/DL — SIGNIFICANT CHANGE UP (ref 32–36)
MCV RBC AUTO: 95.1 FL — SIGNIFICANT CHANGE UP (ref 80–100)
MONOCYTES # BLD AUTO: 0.72 K/UL — SIGNIFICANT CHANGE UP (ref 0–0.9)
MONOCYTES NFR BLD AUTO: 5.8 % — SIGNIFICANT CHANGE UP (ref 2–14)
NEUTROPHILS # BLD AUTO: 10.99 K/UL — HIGH (ref 1.8–7.4)
NEUTROPHILS NFR BLD AUTO: 88.3 % — HIGH (ref 43–77)
NRBC # BLD: 0 /100 WBCS — SIGNIFICANT CHANGE UP (ref 0–0)
PLATELET # BLD AUTO: 102 K/UL — LOW (ref 150–400)
POTASSIUM SERPL-MCNC: 4.2 MMOL/L — SIGNIFICANT CHANGE UP (ref 3.5–5.3)
POTASSIUM SERPL-SCNC: 4.2 MMOL/L — SIGNIFICANT CHANGE UP (ref 3.5–5.3)
PROT SERPL-MCNC: 7.1 G/DL — SIGNIFICANT CHANGE UP (ref 6–8.3)
PROTHROM AB SERPL-ACNC: 13.4 SEC — HIGH (ref 10–12.9)
RBC # BLD: 3.46 M/UL — LOW (ref 4.2–5.8)
RBC # FLD: 15 % — HIGH (ref 10.3–14.5)
SODIUM SERPL-SCNC: 137 MMOL/L — SIGNIFICANT CHANGE UP (ref 135–145)
WBC # BLD: 12.44 K/UL — HIGH (ref 3.8–10.5)
WBC # FLD AUTO: 12.44 K/UL — HIGH (ref 3.8–10.5)

## 2019-08-22 PROCEDURE — 93010 ELECTROCARDIOGRAM REPORT: CPT

## 2019-08-22 PROCEDURE — 71045 X-RAY EXAM CHEST 1 VIEW: CPT | Mod: 26

## 2019-08-22 PROCEDURE — 99285 EMERGENCY DEPT VISIT HI MDM: CPT

## 2019-08-22 RX ORDER — HEPARIN SODIUM 5000 [USP'U]/ML
5000 INJECTION INTRAVENOUS; SUBCUTANEOUS EVERY 12 HOURS
Refills: 0 | Status: DISCONTINUED | OUTPATIENT
Start: 2019-08-22 | End: 2019-08-24

## 2019-08-22 RX ORDER — ONDANSETRON 8 MG/1
4 TABLET, FILM COATED ORAL EVERY 6 HOURS
Refills: 0 | Status: DISCONTINUED | OUTPATIENT
Start: 2019-08-22 | End: 2019-08-24

## 2019-08-22 RX ORDER — FLUDROCORTISONE ACETATE 0.1 MG/1
0.1 TABLET ORAL
Refills: 0 | Status: DISCONTINUED | OUTPATIENT
Start: 2019-08-22 | End: 2019-08-30

## 2019-08-22 RX ORDER — INSULIN LISPRO 100/ML
VIAL (ML) SUBCUTANEOUS
Refills: 0 | Status: DISCONTINUED | OUTPATIENT
Start: 2019-08-22 | End: 2019-08-30

## 2019-08-22 RX ORDER — VANCOMYCIN HCL 1 G
1000 VIAL (EA) INTRAVENOUS
Refills: 0 | Status: DISCONTINUED | OUTPATIENT
Start: 2019-08-22 | End: 2019-08-24

## 2019-08-22 RX ORDER — NIFEDIPINE 30 MG
30 TABLET, EXTENDED RELEASE 24 HR ORAL DAILY
Refills: 0 | Status: DISCONTINUED | OUTPATIENT
Start: 2019-08-22 | End: 2019-08-24

## 2019-08-22 RX ORDER — ACETAMINOPHEN 500 MG
650 TABLET ORAL EVERY 6 HOURS
Refills: 0 | Status: DISCONTINUED | OUTPATIENT
Start: 2019-08-22 | End: 2019-08-26

## 2019-08-22 RX ORDER — PIPERACILLIN AND TAZOBACTAM 4; .5 G/20ML; G/20ML
3.38 INJECTION, POWDER, LYOPHILIZED, FOR SOLUTION INTRAVENOUS EVERY 12 HOURS
Refills: 0 | Status: DISCONTINUED | OUTPATIENT
Start: 2019-08-22 | End: 2019-08-24

## 2019-08-22 RX ORDER — ACETAMINOPHEN 500 MG
1000 TABLET ORAL ONCE
Refills: 0 | Status: COMPLETED | OUTPATIENT
Start: 2019-08-22 | End: 2019-08-22

## 2019-08-22 RX ORDER — PREGABALIN 225 MG/1
1000 CAPSULE ORAL DAILY
Refills: 0 | Status: DISCONTINUED | OUTPATIENT
Start: 2019-08-22 | End: 2019-08-30

## 2019-08-22 RX ORDER — ASPIRIN/CALCIUM CARB/MAGNESIUM 324 MG
81 TABLET ORAL DAILY
Refills: 0 | Status: DISCONTINUED | OUTPATIENT
Start: 2019-08-22 | End: 2019-08-24

## 2019-08-22 RX ORDER — PIPERACILLIN AND TAZOBACTAM 4; .5 G/20ML; G/20ML
3.38 INJECTION, POWDER, LYOPHILIZED, FOR SOLUTION INTRAVENOUS ONCE
Refills: 0 | Status: COMPLETED | OUTPATIENT
Start: 2019-08-22 | End: 2019-08-23

## 2019-08-22 RX ORDER — INSULIN NPH HUM/REG INSULIN HM 70-30/ML
10 VIAL (ML) SUBCUTANEOUS
Qty: 0 | Refills: 0 | DISCHARGE

## 2019-08-22 RX ORDER — SODIUM CHLORIDE 9 MG/ML
1000 INJECTION, SOLUTION INTRAVENOUS ONCE
Refills: 0 | Status: COMPLETED | OUTPATIENT
Start: 2019-08-22 | End: 2019-08-22

## 2019-08-22 RX ORDER — OXYCODONE AND ACETAMINOPHEN 5; 325 MG/1; MG/1
1 TABLET ORAL EVERY 6 HOURS
Refills: 0 | Status: DISCONTINUED | OUTPATIENT
Start: 2019-08-22 | End: 2019-08-24

## 2019-08-22 RX ORDER — PIPERACILLIN AND TAZOBACTAM 4; .5 G/20ML; G/20ML
3.38 INJECTION, POWDER, LYOPHILIZED, FOR SOLUTION INTRAVENOUS ONCE
Refills: 0 | Status: COMPLETED | OUTPATIENT
Start: 2019-08-22 | End: 2019-08-22

## 2019-08-22 RX ORDER — INSULIN GLARGINE 100 [IU]/ML
5 INJECTION, SOLUTION SUBCUTANEOUS ONCE
Refills: 0 | Status: COMPLETED | OUTPATIENT
Start: 2019-08-22 | End: 2019-08-22

## 2019-08-22 RX ORDER — VANCOMYCIN HCL 1 G
1000 VIAL (EA) INTRAVENOUS ONCE
Refills: 0 | Status: COMPLETED | OUTPATIENT
Start: 2019-08-22 | End: 2019-08-22

## 2019-08-22 RX ORDER — FOLIC ACID 0.8 MG
1 TABLET ORAL DAILY
Refills: 0 | Status: DISCONTINUED | OUTPATIENT
Start: 2019-08-22 | End: 2019-08-30

## 2019-08-22 RX ORDER — GABAPENTIN 400 MG/1
200 CAPSULE ORAL DAILY
Refills: 0 | Status: DISCONTINUED | OUTPATIENT
Start: 2019-08-22 | End: 2019-08-30

## 2019-08-22 RX ORDER — DIPHENHYDRAMINE HCL 50 MG
25 CAPSULE ORAL AT BEDTIME
Refills: 0 | Status: DISCONTINUED | OUTPATIENT
Start: 2019-08-22 | End: 2019-08-30

## 2019-08-22 RX ORDER — INSULIN GLARGINE 100 [IU]/ML
6 INJECTION, SOLUTION SUBCUTANEOUS AT BEDTIME
Refills: 0 | Status: DISCONTINUED | OUTPATIENT
Start: 2019-08-22 | End: 2019-08-24

## 2019-08-22 RX ORDER — INSULIN LISPRO 100/ML
3 VIAL (ML) SUBCUTANEOUS
Refills: 0 | Status: DISCONTINUED | OUTPATIENT
Start: 2019-08-22 | End: 2019-08-24

## 2019-08-22 RX ORDER — ACETAMINOPHEN 500 MG
650 TABLET ORAL ONCE
Refills: 0 | Status: COMPLETED | OUTPATIENT
Start: 2019-08-22 | End: 2019-08-22

## 2019-08-22 RX ORDER — SIMVASTATIN 20 MG/1
40 TABLET, FILM COATED ORAL AT BEDTIME
Refills: 0 | Status: DISCONTINUED | OUTPATIENT
Start: 2019-08-22 | End: 2019-08-30

## 2019-08-22 RX ADMIN — Medication 650 MILLIGRAM(S): at 19:20

## 2019-08-22 RX ADMIN — Medication 250 MILLIGRAM(S): at 20:20

## 2019-08-22 RX ADMIN — Medication 650 MILLIGRAM(S): at 17:45

## 2019-08-22 RX ADMIN — SODIUM CHLORIDE 1000 MILLILITER(S): 9 INJECTION, SOLUTION INTRAVENOUS at 20:20

## 2019-08-22 RX ADMIN — ONDANSETRON 4 MILLIGRAM(S): 8 TABLET, FILM COATED ORAL at 23:29

## 2019-08-22 RX ADMIN — PIPERACILLIN AND TAZOBACTAM 200 GRAM(S): 4; .5 INJECTION, POWDER, LYOPHILIZED, FOR SOLUTION INTRAVENOUS at 19:25

## 2019-08-22 RX ADMIN — Medication 400 MILLIGRAM(S): at 23:29

## 2019-08-22 RX ADMIN — SODIUM CHLORIDE 1000 MILLILITER(S): 9 INJECTION, SOLUTION INTRAVENOUS at 17:47

## 2019-08-22 NOTE — ED PROVIDER NOTE - OBJECTIVE STATEMENT
59 yo male with pmh of ESRD (TTS) left BKA, CAD, CHF, DM, HTN presents to the ED from [facility] for fever. Pt states he was receiving HD today when he developed a fever and chills. He denies cough, sore throat, chest pain, dysuria, abd pain, N/V/D, rashes.

## 2019-08-22 NOTE — H&P ADULT - PROBLEM SELECTOR PLAN 8
IMPROVE VTE Individual Risk Assessment          RISK                                                          Points  [  ] Previous VTE                                                3  [  ] Thrombophilia                                             2  [  ] Lower limb paralysis                                   2        (unable to hold up >15 seconds)    [  ] Current Cancer                                             2         (within 6 months)  [ X ] Immobilization > 24 hrs                              1  [  ] ICU/CCU stay > 24 hours                             1  [  ] Age > 60                                                         1    IMPROVE VTE Score: 1    heparin q12 for dvt ppx

## 2019-08-22 NOTE — H&P ADULT - PROBLEM SELECTOR PLAN 7
IMPROVE VTE Individual Risk Assessment          RISK                                                          Points  [  ] Previous VTE                                                3  [  ] Thrombophilia                                             2  [  ] Lower limb paralysis                                   2        (unable to hold up >15 seconds)    [  ] Current Cancer                                             2         (within 6 months)  [ X ] Immobilization > 24 hrs                              1  [  ] ICU/CCU stay > 24 hours                             1  [  ] Age > 60                                                         1    IMPROVE VTE Score: 1    heparin q12 for dvt ppx c/w neurontin  c/w pamelor

## 2019-08-22 NOTE — ED ADULT NURSE REASSESSMENT NOTE - NS ED NURSE REASSESS COMMENT FT1
pt had a fever and episode of nausea and vomiting. Dr Pradeep Bo notified, pt medicated with IV zofran and tylenol.  Pt verbalized feeling better and sent upstairs to room.  MICHI Yusuf of [facility] informed of pt's fever and episode of vomiting.

## 2019-08-22 NOTE — H&P ADULT - HISTORY OF PRESENT ILLNESS
59 y/o male from [facility], with PMHx of DM, partial vision loss, and ESRD (on TTS HD via [facility] AVF) is sent to the ED for fever during his dialysis session today. Patient reports he was able to       who presents with nausea and vomiting starting 3 days prior post HD.  Pt states after HD on Thursday he became acutely nauseated, and had 3 episodes of NBNB vomiting.  Pt denies developing abd pain.  Pt states the nausea and vomiting persisted over the last 2 days.  Pt was so nauseated he could not tolerate HD yesterday, and was sent to the ED with concern for HD, and electrolyte imbalance in the setting of intractable vomiting.  Pt also reports chronic non-productive cough, but does not know how long it has been going on.  Pt denies CP, palpitations, HA, dizziness, blurred vision, slurred speech, numbness/ weakness, fever, or syncope.     In the ED, pt presents in no acute distress, vitals WNL, and EKG sinus tach @ 116 57 y/o male from [facility], with PMHx of DM, partial vision loss, and ESRD (on TTS HD via [facility]) is sent to the ED for fever during his dialysis session today. Patient reports he completed 4 hours of dialysis PTA. He reports his fever started today and is associated with chills. He denies any cough, shortness of breath, chest pain, abdominal pain, nausea, vomiting, diarrhea, dysuria or increased urinary frequency. 59 y/o male from [facility], with PMHx of DM, partial vision loss, and ESRD (on TTS HD via [facility]) is sent to the ED for fever during his dialysis session today. Patient reports he completed 4 hours of dialysis PTA. He reports his fever started today and is associated with chills. He denies any cough, shortness of breath, chest pain, abdominal pain, nausea, vomiting, diarrhea, dysuria or increased urinary frequency.    ED course: s/p vanc and zosyn x 1. s/p 1L LR bolus x2.    [facility] Full code. 57 y/o male from [facility], with PMHx of DM (on insulin), partial vision loss, ESRD (on TTS HD via [facility]), s/p left BKA and adrenal insufficiency is sent to the ED for fever during his dialysis session today. Patient reports he completed 4 hours of dialysis PTA. He reports his fever started today and is associated with chills. He denies any cough, shortness of breath, chest pain, abdominal pain, nausea, vomiting, diarrhea, dysuria or increased urinary frequency.    ED course: s/p vanc and zosyn x 1. s/p 1L LR bolus x2.    GOC as per [facility] records: Full code.

## 2019-08-22 NOTE — H&P ADULT - NSHPPHYSICALEXAM_GEN_ALL_CORE
Vital Signs Last 24 Hrs  T(C): 37.3 (22 Aug 2019 20:40), Max: 38.8 (22 Aug 2019 17:08)  T(F): 99.2 (22 Aug 2019 20:40), Max: 101.8 (22 Aug 2019 17:08)  HR: 110 (22 Aug 2019 20:40) (110 - 110)  BP: 134/76 (22 Aug 2019 20:40) (109/67 - 134/76)  BP(mean): --  RR: 18 (22 Aug 2019 20:40) (18 - 20)  SpO2: 98% (22 Aug 2019 20:40) (98% - 98%)

## 2019-08-22 NOTE — H&P ADULT - PROBLEM SELECTOR PLAN 1
T 101.8 F, , WBC count 12.44  s/p vanc and zosyn x 1 in ED  c/w vancomycin post-dialysis (TTS)  c/w zosyn, renally dosed  f/u blood cx  UA and urine cx collection pending as patient is refusing to give sample at present  tylenol prn for fevers  ID consulted - Dr. Rosales

## 2019-08-22 NOTE — H&P ADULT - PROBLEM SELECTOR PLAN 4
takens humulin 70/30 10 units before breakfast and 5 units before dinner  starting lantus 5 units qhs  c/w hss inpatient  monitor fs  f/u HbA1C takens humulin 70/30 10 units before breakfast and 5 units before dinner  will give lantus 6 units qhs and humalog 3 units tid inpatient  c/w hss inpatient  monitor fs  f/u HbA1C

## 2019-08-23 ENCOUNTER — TRANSCRIPTION ENCOUNTER (OUTPATIENT)
Age: 59
End: 2019-08-23

## 2019-08-23 LAB
-  K. PNEUMONIAE GROUP: SIGNIFICANT CHANGE UP
APPEARANCE UR: ABNORMAL
BACTERIA # UR AUTO: ABNORMAL /HPF
BILIRUB UR-MCNC: NEGATIVE — SIGNIFICANT CHANGE UP
COLOR SPEC: YELLOW — SIGNIFICANT CHANGE UP
COMMENT - URINE: SIGNIFICANT CHANGE UP
DIFF PNL FLD: ABNORMAL
EPI CELLS # UR: SIGNIFICANT CHANGE UP /HPF
GLUCOSE BLDC GLUCOMTR-MCNC: 70 MG/DL — SIGNIFICANT CHANGE UP (ref 70–99)
GLUCOSE BLDC GLUCOMTR-MCNC: 71 MG/DL — SIGNIFICANT CHANGE UP (ref 70–99)
GLUCOSE BLDC GLUCOMTR-MCNC: 78 MG/DL — SIGNIFICANT CHANGE UP (ref 70–99)
GLUCOSE BLDC GLUCOMTR-MCNC: 88 MG/DL — SIGNIFICANT CHANGE UP (ref 70–99)
GLUCOSE UR QL: NEGATIVE — SIGNIFICANT CHANGE UP
GRAM STN FLD: SIGNIFICANT CHANGE UP
KETONES UR-MCNC: NEGATIVE — SIGNIFICANT CHANGE UP
LEUKOCYTE ESTERASE UR-ACNC: ABNORMAL
METHOD TYPE: SIGNIFICANT CHANGE UP
NITRITE UR-MCNC: NEGATIVE — SIGNIFICANT CHANGE UP
PH UR: 6.5 — SIGNIFICANT CHANGE UP (ref 5–8)
PROT UR-MCNC: 100
RBC CASTS # UR COMP ASSIST: ABNORMAL /HPF (ref 0–2)
SP GR SPEC: 1.01 — SIGNIFICANT CHANGE UP (ref 1.01–1.02)
SPECIMEN SOURCE: SIGNIFICANT CHANGE UP
UROBILINOGEN FLD QL: NEGATIVE — SIGNIFICANT CHANGE UP
WBC UR QL: >50 /HPF (ref 0–5)

## 2019-08-23 RX ORDER — MUPIROCIN 20 MG/G
1 OINTMENT TOPICAL EVERY 12 HOURS
Refills: 0 | Status: DISCONTINUED | OUTPATIENT
Start: 2019-08-23 | End: 2019-08-30

## 2019-08-23 RX ORDER — METOPROLOL TARTRATE 50 MG
2.5 TABLET ORAL ONCE
Refills: 0 | Status: COMPLETED | OUTPATIENT
Start: 2019-08-23 | End: 2019-08-23

## 2019-08-23 RX ORDER — CHLORHEXIDINE GLUCONATE 213 G/1000ML
1 SOLUTION TOPICAL DAILY
Refills: 0 | Status: DISCONTINUED | OUTPATIENT
Start: 2019-08-23 | End: 2019-08-30

## 2019-08-23 RX ADMIN — PIPERACILLIN AND TAZOBACTAM 25 GRAM(S): 4; .5 INJECTION, POWDER, LYOPHILIZED, FOR SOLUTION INTRAVENOUS at 05:56

## 2019-08-23 RX ADMIN — Medication 2.5 MILLIGRAM(S): at 23:37

## 2019-08-23 RX ADMIN — GABAPENTIN 200 MILLIGRAM(S): 400 CAPSULE ORAL at 14:06

## 2019-08-23 RX ADMIN — Medication 1 MILLIGRAM(S): at 14:03

## 2019-08-23 RX ADMIN — INSULIN GLARGINE 5 UNIT(S): 100 INJECTION, SOLUTION SUBCUTANEOUS at 00:18

## 2019-08-23 RX ADMIN — ONDANSETRON 4 MILLIGRAM(S): 8 TABLET, FILM COATED ORAL at 13:58

## 2019-08-23 RX ADMIN — ONDANSETRON 4 MILLIGRAM(S): 8 TABLET, FILM COATED ORAL at 06:02

## 2019-08-23 RX ADMIN — PREGABALIN 1000 MICROGRAM(S): 225 CAPSULE ORAL at 14:04

## 2019-08-23 RX ADMIN — Medication 81 MILLIGRAM(S): at 14:04

## 2019-08-23 RX ADMIN — Medication 1000 MILLIGRAM(S): at 00:38

## 2019-08-23 RX ADMIN — PIPERACILLIN AND TAZOBACTAM 200 GRAM(S): 4; .5 INJECTION, POWDER, LYOPHILIZED, FOR SOLUTION INTRAVENOUS at 13:58

## 2019-08-23 RX ADMIN — PIPERACILLIN AND TAZOBACTAM 25 GRAM(S): 4; .5 INJECTION, POWDER, LYOPHILIZED, FOR SOLUTION INTRAVENOUS at 18:02

## 2019-08-23 RX ADMIN — CHLORHEXIDINE GLUCONATE 1 APPLICATION(S): 213 SOLUTION TOPICAL at 14:05

## 2019-08-23 NOTE — DISCHARGE NOTE NURSING/CASE MANAGEMENT/SOCIAL WORK - NSDCPEWEB_GEN_ALL_CORE
[place] website --- [email or web address] for Tobacco Control website --- [email or web address]

## 2019-08-23 NOTE — DISCHARGE NOTE NURSING/CASE MANAGEMENT/SOCIAL WORK - NSDCDPATPORTLINK_GEN_ALL_CORE
You can access the [facility] Patient Portal, offered by [facility], by registering with the following website: [email or web address]

## 2019-08-23 NOTE — CONSULT NOTE ADULT - SUBJECTIVE AND OBJECTIVE BOX
[facility] : Progress Note :: [phone], (office [phone]),   Dr Mosher / Dr Washington / Dr Young / Dr Doll / Dr Varsha TURCIOS / Dr Tello / Dr Garcia / Dr Larry qiu  _____________________________________________________________________________________________  Patient is a 58y Male with ESRD on HD TTS at [facility] via AVF. Presented to dialysis feeling fine, afebrile. Into dialysis had rigors and chills with fever of 103 associated with vomiting. blood cultures were drawn ( which are resulting negative as of now). given vanco and gentamicin. initially refused to come to hospital but agreed later. in ED found with fever, leucocytosis and vomiting. blood cultures  are growing gram negative rods. renal consulted for ESRD     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  Diabetic neuropathy  Diabetes mellitus  Hyperparathyroidism  Spinal stenosis of lumbosacral region  Peripheral vascular disease  HLD (hyperlipidemia)  Coronary artery disease  Glaucoma  Anemia  CKD (chronic kidney disease)  Adrenal insufficiency  Hypertension  History of left cataract extraction  History of right cataract extraction  Below knee amputation status, left    fish (Rash)  liver (Anaphylaxis)  No Known Drug Allergies    Home Medications Reviewed  Hospital Medications:   MEDICATIONS  (STANDING):  aspirin enteric coated 81 milliGRAM(s) Oral daily  chlorhexidine 2% Cloths 1 Application(s) Topical daily  cyanocobalamin 1000 MICROGram(s) Oral daily  diphenhydrAMINE 25 milliGRAM(s) Oral at bedtime  fludroCORTISONE 0.1 milliGRAM(s) Oral two times a day  folic acid 1 milliGRAM(s) Oral daily  gabapentin 200 milliGRAM(s) Oral daily  heparin  Injectable 5000 Unit(s) SubCutaneous every 12 hours  insulin glargine Injectable (LANTUS) 6 Unit(s) SubCutaneous at bedtime  insulin lispro (HumaLOG) corrective regimen sliding scale   SubCutaneous three times a day before meals  insulin lispro Injectable (HumaLOG) 3 Unit(s) SubCutaneous three times a day before meals  mupirocin 2% Nasal 1 Application(s) Nasal every 12 hours  NIFEdipine XL 30 milliGRAM(s) Oral daily  piperacillin/tazobactam IVPB.. 3.375 Gram(s) IV Intermittent every 12 hours  simvastatin 40 milliGRAM(s) Oral at bedtime  vancomycin  IVPB 1000 milliGRAM(s) IV Intermittent <User Schedule>    SOCIAL HISTORY:  Denies ETOh,Smoking,   FAMILY HISTORY:  Family history of AIDS  Asthma  Family history of cirrhosis of liver        VITALS:  T(F): 98.8 (08-23-19 @ 13:48), Max: 101.8 (08-22-19 @ 17:08)  HR: 107 (08-23-19 @ 13:48)  BP: 148/75 (08-23-19 @ 13:48)  RR: 16 (08-23-19 @ 13:48)  SpO2: 98% (08-23-19 @ 13:48)  Wt(kg): --    Height (cm): 157.48 (08-22 @ 16:56)  Weight (kg): 52.2 (08-22 @ 16:56)  BMI (kg/m2): 21 (08-22 @ 16:56)  BSA (m2): 1.51 (08-22 @ 16:56)    PHYSICAL EXAM:  Constitutional: NAD  HEENT: anicteric sclera, oropharynx clear.  Neck: No JVD  Respiratory: CTAB, no wheezes, rales or rhonchi  Cardiovascular: S1, S2, RRR  Gastrointestinal: BS+, soft, ND/mild generalized tenderness.  Extremities: LT BKA No peripheral edema  Vascular Access: RUEAVF with thrill and bruit.    LABS:  08-22    137  |  98  |  13  ----------------------------<  76  4.2   |  31  |  3.62<H>    Ca    8.5      22 Aug 2019 18:08    TPro  7.1  /  Alb  3.0<L>  /  TBili  0.7  /  DBili      /  AST  20  /  ALT  13  /  AlkPhos  84  08-22    Creatinine Trend: 3.62 <--                        10.6   12.44 )-----------( 102      ( 22 Aug 2019 18:08 )             32.9     Urine Studies:      RADIOLOGY & ADDITIONAL STUDIES:

## 2019-08-23 NOTE — PROGRESS NOTE ADULT - SUBJECTIVE AND OBJECTIVE BOX
Patient is a 58y old  Male who presents with a chief complaint of sepsis (23 Aug 2019 18:29)    PATIENT IS SEEN AND EXAMINED IN MEDICAL FLOOR.    ALLERGIES:  fish (Rash)  liver (Anaphylaxis)  No Known Drug Allergies    VITALS:    Vital Signs Last 24 Hrs  T(C): 37.1 (23 Aug 2019 13:48), Max: 38.6 (22 Aug 2019 23:13)  T(F): 98.8 (23 Aug 2019 13:48), Max: 101.4 (22 Aug 2019 23:13)  HR: 107 (23 Aug 2019 13:48) (102 - 118)  BP: 148/75 (23 Aug 2019 13:48) (134/76 - 169/75)  BP(mean): --  RR: 16 (23 Aug 2019 13:48) (16 - 18)  SpO2: 98% (23 Aug 2019 13:48) (94% - 98%)    LABS:    CBC Full  -  ( 22 Aug 2019 18:08 )  WBC Count : 12.44 K/uL  RBC Count : 3.46 M/uL  Hemoglobin : 10.6 g/dL  Hematocrit : 32.9 %  Platelet Count - Automated : 102 K/uL  Mean Cell Volume : 95.1 fl  Mean Cell Hemoglobin : 30.6 pg  Mean Cell Hemoglobin Concentration : 32.2 gm/dL  Auto Neutrophil # : 10.99 K/uL  Auto Lymphocyte # : 0.55 K/uL  Auto Monocyte # : 0.72 K/uL  Auto Eosinophil # : 0.02 K/uL  Auto Basophil # : 0.05 K/uL  Auto Neutrophil % : 88.3 %  Auto Lymphocyte % : 4.4 %  Auto Monocyte % : 5.8 %  Auto Eosinophil % : 0.2 %  Auto Basophil % : 0.4 %    PT/INR - ( 22 Aug 2019 18:08 )   PT: 13.4 sec;   INR: 1.20 ratio         PTT - ( 22 Aug 2019 18:08 )  PTT:32.5 sec  08-22    137  |  98  |  13  ----------------------------<  76  4.2   |  31  |  3.62<H>    Ca    8.5      22 Aug 2019 18:08    TPro  7.1  /  Alb  3.0<L>  /  TBili  0.7  /  DBili  x   /  AST  20  /  ALT  13  /  AlkPhos  84  08-22    CAPILLARY BLOOD GLUCOSE    POCT Blood Glucose.: 78 mg/dL (23 Aug 2019 11:30)  POCT Blood Glucose.: 88 mg/dL (23 Aug 2019 07:58)  POCT Blood Glucose.: 125 mg/dL (22 Aug 2019 23:34)    LIVER FUNCTIONS - ( 22 Aug 2019 18:08 )  Alb: 3.0 g/dL / Pro: 7.1 g/dL / ALK PHOS: 84 U/L / ALT: 13 U/L DA / AST: 20 U/L / GGT: x           Creatinine Trend: 3.62<--  I&O's Summary    .Blood  08-23 @ 01:18   Growth in aerobic bottle: Gram Variable Rods  "Due to technical problems, Proteus sp. will Not be reported as part of  the BCID panel until further notice" ***Blood Panel PCR results on this  specimen are available  approximately 3 hours after the Gram stain result.***  Gram stain, PCR, and/or culture results may not always  correspond due to difference in methodologies.  ************************************************************  This PCR assay was performed using [employer].  The following targets are tested for: Enterococcus,  vancomycin resistant enterococci, Listeria monocytogenes,  coagulase negative staphylococci, S. aureus,  methicillin resistant S. aureus, Streptococcus agalactiae  (Group B), S. pneumoniae, S. pyogenes (Group A),  Acinetobacter baumannii, Enterobacter cloacae, E. coli,  Klebsiella oxytoca, K. pneumoniae, Proteus sp.,  Serratia marcescens, Haemophilus influenzae,  Neisseria meningitidis, Pseudomonas aeruginosa, Candida  albicans, C. glabrata, C krusei, C parapsilosis,  C. tropicalis and the KPC resistance gene.  --  Blood Culture PCR      MEDICATIONS:    MEDICATIONS  (STANDING):  aspirin enteric coated 81 milliGRAM(s) Oral daily  chlorhexidine 2% Cloths 1 Application(s) Topical daily  cyanocobalamin 1000 MICROGram(s) Oral daily  diphenhydrAMINE 25 milliGRAM(s) Oral at bedtime  fludroCORTISONE 0.1 milliGRAM(s) Oral two times a day  folic acid 1 milliGRAM(s) Oral daily  gabapentin 200 milliGRAM(s) Oral daily  heparin  Injectable 5000 Unit(s) SubCutaneous every 12 hours  insulin glargine Injectable (LANTUS) 6 Unit(s) SubCutaneous at bedtime  insulin lispro (HumaLOG) corrective regimen sliding scale   SubCutaneous three times a day before meals  insulin lispro Injectable (HumaLOG) 3 Unit(s) SubCutaneous three times a day before meals  mupirocin 2% Nasal 1 Application(s) Nasal every 12 hours  NIFEdipine XL 30 milliGRAM(s) Oral daily  piperacillin/tazobactam IVPB.. 3.375 Gram(s) IV Intermittent every 12 hours  simvastatin 40 milliGRAM(s) Oral at bedtime  vancomycin  IVPB 1000 milliGRAM(s) IV Intermittent <User Schedule>      MEDICATIONS  (PRN):  acetaminophen   Tablet .. 650 milliGRAM(s) Oral every 6 hours PRN Temp greater or equal to 38C (100.4F), Mild Pain (1 - 3)  ondansetron Injectable 4 milliGRAM(s) IV Push every 6 hours PRN Nausea and/or Vomiting  oxyCODONE    5 mG/acetaminophen 325 mG 1 Tablet(s) Oral every 6 hours PRN Moderate Pain (4 - 6)      REVIEW OF SYSTEMS:                           ALL ROS DONE [ X   ]    CONSTITUTIONAL:  LETHARGIC [   ], FEVER [   ], UNRESPONSIVE [   ]  CVS:  CP  [   ], SOB, [   ], PALPITATIONS [   ], DIZZYNESS [   ]  RS: COUGH [   ], SPUTUM [   ]  GI: ABDOMINAL PAIN [   ], NAUSEA [   ], VOMITINGS [   ], DIARRHEA [   ], CONSTIPATION [   ]  :  DYSURIA [   ], NOCTURIA [   ], INCREASED FREQUENCY [   ], DRIBLING [   ],  SKELETAL: PAINFUL JOINTS [   ], SWOLLEN JOINTS [   ], NECK ACHE [   ], LOW BACK ACHE [   ],  SKIN : ULCERS [   ], RASH [   ], ITCHING [   ]  CNS: HEAD ACHE [   ], DOUBLE VISION [   ], BLURRED VISION [   ], AMS / CONFUSION [   ], SEIZURES [   ], WEAKNESS [   ],TINGLING / NUMBNESS [   ]    PHYSICAL EXAMINATION:  GENERAL APPEARANCE: NO DISTRESS  HEENT:  NO PALLOR, NO  JVD,  NO   NODES, NECK SUPPLE  CVS: S1 +, S2 +,   RS: AEEB,  OCCASIONAL  RALES +,   NO RONCHI  ABD: SOFT, NT, NO, BS +  EXT: NO PE  SKIN: WARM,   SKELETAL:  ROM ACCEPTABLE  CNS:  AAO X 2-3   , NO  DEFICITS    RADIOLOGY :    < from: Xray Chest 1 View-PORTABLE IMMEDIATE (08.22.19 @ 18:27) >    IMPRESSION: Normal AP chest.    < end of copied text >      ASSESSMENT :     Fever  Vision loss of left eye  Osteoporosis  Osteoarthritis  Contracture of hand  Diabetic neuropathy  Diabetes mellitus  Hyperparathyroidism  Spinal stenosis of lumbosacral region  Peripheral vascular disease  HLD (hyperlipidemia)  Coronary artery disease  Glaucoma  Anemia  CKD (chronic kidney disease)  Adrenal insufficiency  Hypertension  History of left cataract extraction  History of right cataract extraction  Below knee amputation status, left      PLAN:  HPI:  57 y/o male from [facility], with PMHx of DM (on insulin), partial vision loss, ESRD (on TTS HD via LUE AVF), s/p left BKA and adrenal insufficiency is sent to the ED for fever during his dialysis session today. Patient reports he completed 4 hours of dialysis PTA. He reports his fever started today and is associated with chills. He denies any cough, shortness of breath, chest pain, abdominal pain, nausea, vomiting, diarrhea, dysuria or increased urinary frequency.    ED course: s/p vanc and zosyn x 1. s/p 1L LR bolus x2.    GOC as per [facility] records: Full code. (22 Aug 2019 22:20)    - UTI AND BACTEREMIA ON IV ZOSYN AND VANCOMYCIN. AWAITING ON FINAL BLOOD AND URINE CXS. RPT BLOOD CXS  - ESRD ON HD  - GI AND DVT PROPHYLAXIS   -

## 2019-08-23 NOTE — CONSULT NOTE ADULT - ASSESSMENT
Patient is a 58y Male with ESRD on HD TTS at [facility] via AVF. Presented to dialysis feeling fine, afebrile. Into dialysis had rigors and chills with fever of 103 associated with vomiting. blood cultures were drawn ( which are resulting negative as of now). given vanco and gentamicin. initially refused to come to hospital but agreed later. in ED found with fever, leucocytosis and vomiting. blood cultures  are growing gram negative rods. renal consulted for ESRD     # ESRD TTS  # FEVER WITH GRAM NEGATIVE BACTREMIA- PER Infectious disease specialist  # ABENIA OF CKD. STABLE. GIVE AYAN WITH HD  # HTN BP CONTROLLED     Many thanks for the consult

## 2019-08-23 NOTE — CONSULT NOTE ADULT - SUBJECTIVE AND OBJECTIVE BOX
Patient is a 58y old  Male who presents with a chief complaint of sepsis (23 Aug 2019 15:16)        REVIEW OF SYSTEMS: Total of twelve systems have been reviewed with patient and found to be negative unless mentioned in HPI        PAST MEDICAL & SURGICAL HISTORY:  Vision loss of left eye  Osteoporosis, Osteoarthritis  Contracture of hand: fingers of right and left hand  Diabetes mellitus, Diabetic neuropathy  Hyperparathyroidism  Spinal stenosis of lumbosacral region  Peripheral vascular disease  HLD (hyperlipidemia), Coronary artery disease  Glaucoma, Anemia  CKD (chronic kidney disease)  Adrenal insufficiency  Hypertension  History of left cataract extraction  History of right cataract extraction  Below knee amputation status, left        SOCIAL HISTORY  Alcohol: Does not drink  Tobacco: Does not smoke  Illicit substance use: None      FAMILY HISTORY: Non contributory to the present illness        fish (Rash)  liver (Anaphylaxis)  No Known Drug Allergies        Vital Signs Last 24 Hrs  T(C): 37.1 (23 Aug 2019 13:48), Max: 38.6 (22 Aug 2019 23:13)  T(F): 98.8 (23 Aug 2019 13:48), Max: 101.4 (22 Aug 2019 23:13)  HR: 107 (23 Aug 2019 13:48) (102 - 118)  BP: 148/75 (23 Aug 2019 13:48) (134/76 - 169/75)  BP(mean): --  RR: 16 (23 Aug 2019 13:48) (16 - 18)  SpO2: 98% (23 Aug 2019 13:48) (94% - 98%)      PHYSICAL EXAM:  GENERAL: Not in distress   CHEST/LUNG:  Aire ntry bilaterally  HEART: s1 and s2 present  ABDOMEN:  Nontender and  Nondistended  EXTREMITIES: No pedal  edema  CNS: Awake and Alert        LABS:                        10.6   12.44 )-----------( 102      ( 22 Aug 2019 18:08 )             32.9         08-22    137  |  98  |  13  ----------------------------<  76  4.2   |  31  |  3.62<H>    Ca    8.5      22 Aug 2019 18:08    TPro  7.1  /  Alb  3.0<L>  /  TBili  0.7  /  DBili  x   /  AST  20  /  ALT  13  /  AlkPhos  84  08-22  PT/INR - ( 22 Aug 2019 18:08 )   PT: 13.4 sec;   INR: 1.20 ratio    PTT - ( 22 Aug 2019 18:08 )  PTT:32.5 sec        CAPILLARY BLOOD GLUCOSE  POCT Blood Glucose.: 78 mg/dL (23 Aug 2019 11:30)  POCT Blood Glucose.: 88 mg/dL (23 Aug 2019 07:58)  POCT Blood Glucose.: 125 mg/dL (22 Aug 2019 23:34)        Urinalysis Basic - ( 23 Aug 2019 15:52 )  Color: Yellow / Appearance: Slightly Turbid / S.010 / pH: x  Gluc: x / Ketone: Negative  / Bili: Negative / Urobili: Negative   Blood: x / Protein: 100 / Nitrite: Negative   Leuk Esterase: Moderate / RBC: 25-50 /HPF / WBC >50 /HPF   Sq Epi: x / Non Sq Epi: Few /HPF / Bacteria: Moderate /HPF        MEDICATIONS  (STANDING):  aspirin enteric coated 81 milliGRAM(s) Oral daily  chlorhexidine 2% Cloths 1 Application(s) Topical daily  cyanocobalamin 1000 MICROGram(s) Oral daily  diphenhydrAMINE 25 milliGRAM(s) Oral at bedtime  fludroCORTISONE 0.1 milliGRAM(s) Oral two times a day  folic acid 1 milliGRAM(s) Oral daily  gabapentin 200 milliGRAM(s) Oral daily  heparin  Injectable 5000 Unit(s) SubCutaneous every 12 hours  insulin glargine Injectable (LANTUS) 6 Unit(s) SubCutaneous at bedtime  insulin lispro (HumaLOG) corrective regimen sliding scale   SubCutaneous three times a day before meals  insulin lispro Injectable (HumaLOG) 3 Unit(s) SubCutaneous three times a day before meals  mupirocin 2% Nasal 1 Application(s) Nasal every 12 hours  NIFEdipine XL 30 milliGRAM(s) Oral daily  piperacillin/tazobactam IVPB.. 3.375 Gram(s) IV Intermittent every 12 hours  simvastatin 40 milliGRAM(s) Oral at bedtime  vancomycin  IVPB 1000 milliGRAM(s) IV Intermittent <User Schedule>    MEDICATIONS  (PRN):  acetaminophen   Tablet .. 650 milliGRAM(s) Oral every 6 hours PRN Temp greater or equal to 38C (100.4F), Mild Pain (1 - 3)  ondansetron Injectable 4 milliGRAM(s) IV Push every 6 hours PRN Nausea and/or Vomiting  oxyCODONE    5 mG/acetaminophen 325 mG 1 Tablet(s) Oral every 6 hours PRN Moderate Pain (4 - 6)        RADIOLOGY & ADDITIONAL TESTS:      < from: Xray Chest 1 View-PORTABLE IMMEDIATE (19 @ 18:27) >  The cardiac silhouette is within normal limits. The lungs are clear. No   pleural abnormality.    < end of copied text > Patient is a 58y old  Male from [facility], with DM (on insulin), partial vision loss, ESRD (on TTS HD via LUE AVF), s/p left BKA and adrenal insufficiency has sent to the ER for  evaluation of fever and chills, during his dialysis session.  He has no cough,  No shortness of breath, No abdominal pain, nausea, vomiting, diarrhea, or dysuria. On admission, he found to have Fever, tachycardia, Leukocytosis and positive Urine analysis.  The CXR shows Lungs are clear. He has started on Zosyn and Vancomycin , and The ID consult requested to assist with further evaluation  and antibiotic management.         REVIEW OF SYSTEMS: Total of twelve systems have been reviewed with patient and found to be negative unless mentioned in HPI        PAST MEDICAL & SURGICAL HISTORY:  Vision loss of left eye  Osteoporosis, Osteoarthritis  Contracture of hand: fingers of right and left hand  Diabetes mellitus, Diabetic neuropathy  Hyperparathyroidism  Spinal stenosis of lumbosacral region  Peripheral vascular disease  HLD (hyperlipidemia), Coronary artery disease  Glaucoma, Anemia  CKD (chronic kidney disease)  Adrenal insufficiency, Hypertension  History of left cataract extraction  History of right cataract extraction  Below knee amputation status, left        SOCIAL HISTORY  Alcohol: Does not drink  Tobacco: Does not smoke  Illicit substance use: None        FAMILY HISTORY: Non contributory to the present illness        ALLERGIES:  fish (Rash)  liver (Anaphylaxis)  No Known Drug Allergies        Vital Signs Last 24 Hrs  T(C): 37.1 (23 Aug 2019 13:48), Max: 38.6 (22 Aug 2019 23:13)  T(F): 98.8 (23 Aug 2019 13:48), Max: 101.4 (22 Aug 2019 23:13)  HR: 107 (23 Aug 2019 13:48) (102 - 118)  BP: 148/75 (23 Aug 2019 13:48) (134/76 - 169/75)  BP(mean): --  RR: 16 (23 Aug 2019 13:48) (16 - 18)  SpO2: 98% (23 Aug 2019 13:48) (94% - 98%)        PHYSICAL EXAM:  GENERAL: Not in distress   CHEST/LUNG:  Air entry bilaterally  HEART: s1 and s2 present  ABDOMEN:  Nontender and  Nondistended  EXTREMITIES: Left BKA  CNS: Awake and Alert        LABS:                        10.6   12.44 )-----------( 102      ( 22 Aug 2019 18:08 )             32.9         08-22    137  |  98  |  13  ----------------------------<  76  4.2   |  31  |  3.62<H>    Ca    8.5      22 Aug 2019 18:08    TPro  7.1  /  Alb  3.0<L>  /  TBili  0.7  /  DBili  x   /  AST  20  /  ALT  13  /  AlkPhos  84  08-22  PT/INR - ( 22 Aug 2019 18:08 )   PT: 13.4 sec;   INR: 1.20 ratio    PTT - ( 22 Aug 2019 18:08 )  PTT:32.5 sec        CAPILLARY BLOOD GLUCOSE  POCT Blood Glucose.: 78 mg/dL (23 Aug 2019 11:30)  POCT Blood Glucose.: 88 mg/dL (23 Aug 2019 07:58)  POCT Blood Glucose.: 125 mg/dL (22 Aug 2019 23:34)        Urinalysis Basic - ( 23 Aug 2019 15:52 )  Color: Yellow / Appearance: Slightly Turbid / S.010 / pH: x  Gluc: x / Ketone: Negative  / Bili: Negative / Urobili: Negative   Blood: x / Protein: 100 / Nitrite: Negative   Leuk Esterase: Moderate / RBC: 25-50 /HPF / WBC >50 /HPF   Sq Epi: x / Non Sq Epi: Few /HPF / Bacteria: Moderate /HPF        MEDICATIONS  (STANDING):  aspirin enteric coated 81 milliGRAM(s) Oral daily  chlorhexidine 2% Cloths 1 Application(s) Topical daily  cyanocobalamin 1000 MICROGram(s) Oral daily  diphenhydrAMINE 25 milliGRAM(s) Oral at bedtime  fludroCORTISONE 0.1 milliGRAM(s) Oral two times a day  folic acid 1 milliGRAM(s) Oral daily  gabapentin 200 milliGRAM(s) Oral daily  heparin  Injectable 5000 Unit(s) SubCutaneous every 12 hours  insulin glargine Injectable (LANTUS) 6 Unit(s) SubCutaneous at bedtime  insulin lispro (HumaLOG) corrective regimen sliding scale   SubCutaneous three times a day before meals  insulin lispro Injectable (HumaLOG) 3 Unit(s) SubCutaneous three times a day before meals  mupirocin 2% Nasal 1 Application(s) Nasal every 12 hours  NIFEdipine XL 30 milliGRAM(s) Oral daily  piperacillin/tazobactam IVPB.. 3.375 Gram(s) IV Intermittent every 12 hours  simvastatin 40 milliGRAM(s) Oral at bedtime  vancomycin  IVPB 1000 milliGRAM(s) IV Intermittent <User Schedule>    MEDICATIONS  (PRN):  acetaminophen   Tablet .. 650 milliGRAM(s) Oral every 6 hours PRN Temp greater or equal to 38C (100.4F), Mild Pain (1 - 3)  ondansetron Injectable 4 milliGRAM(s) IV Push every 6 hours PRN Nausea and/or Vomiting  oxyCODONE    5 mG/acetaminophen 325 mG 1 Tablet(s) Oral every 6 hours PRN Moderate Pain (4 - 6)        RADIOLOGY & ADDITIONAL TESTS:    19 : Xray Chest 1 View-PORTABLE IMMEDIATE (19 @ 18:27) The cardiac silhouette is within normal limits. The lungs are clear. No pleural abnormality.

## 2019-08-23 NOTE — CONSULT NOTE ADULT - ASSESSMENT
# Sepsis ( Fever + tachycardia + leukocytosis )  # UTI  # High grade Gram Negative Bacteremia - Klebsiella    would recommend:    1. Follow up final Blood cultures for sensitivity of Klebsiella  2. Repeat Blood cultures X 2 in AM to document clearing the blood stream   3. Monitor WBC count  4. Continue zosyn until work up is done and Less likely need Vancomycin      will follow the patient with you and make further recommendation based on the clinical course and Lab results  Thank you for the opportunity to participate in Mr. VALERO's care Patient is a 58y old  Male from [facility], with DM (on insulin), partial vision loss, ESRD (on TTS HD via LUE AVF), s/p left BKA and adrenal insufficiency has sent to the ER for  evaluation of fever and chills, during his dialysis session.  He has no cough,  No shortness of breath, No abdominal pain, nausea, vomiting, diarrhea, or dysuria. On admission, he found to have Fever, tachycardia, Leukocytosis and positive Urine analysis.  The CXR shows Lungs are clear. He has started on Zosyn and Vancomycin , and The ID consult requested to assist with further evaluation  and antibiotic management.     # Sepsis ( Fever + tachycardia + leukocytosis )  # UTI  # High grade Gram Negative Bacteremia - Klebsiella - Most likely source is     would recommend:    1. Follow up final Blood cultures for sensitivity of Klebsiella  2. Repeat Blood cultures X 2 in AM to document clearing the blood stream   3. Monitor WBC count  4. Continue zosyn until work up is done and Less likely need Vancomycin    d/w Patient     will follow the patient with you and make further recommendation based on the clinical course and Lab results  Thank you for the opportunity to participate in Mr. VALERO's care

## 2019-08-24 DIAGNOSIS — K92.0 HEMATEMESIS: ICD-10-CM

## 2019-08-24 DIAGNOSIS — D64.9 ANEMIA, UNSPECIFIED: ICD-10-CM

## 2019-08-24 LAB
ANION GAP SERPL CALC-SCNC: 19 MMOL/L — HIGH (ref 5–17)
ANION GAP SERPL CALC-SCNC: 19 MMOL/L — HIGH (ref 5–17)
B-OH-BUTYR SERPL-SCNC: 4.9 MMOL/L — HIGH
BUN SERPL-MCNC: 22 MG/DL — HIGH (ref 7–18)
BUN SERPL-MCNC: 41 MG/DL — HIGH (ref 7–18)
CALCIUM SERPL-MCNC: 8.6 MG/DL — SIGNIFICANT CHANGE UP (ref 8.4–10.5)
CALCIUM SERPL-MCNC: 9.3 MG/DL — SIGNIFICANT CHANGE UP (ref 8.4–10.5)
CHLORIDE SERPL-SCNC: 91 MMOL/L — LOW (ref 96–108)
CHLORIDE SERPL-SCNC: 95 MMOL/L — LOW (ref 96–108)
CO2 SERPL-SCNC: 25 MMOL/L — SIGNIFICANT CHANGE UP (ref 22–31)
CO2 SERPL-SCNC: 28 MMOL/L — SIGNIFICANT CHANGE UP (ref 22–31)
CREAT SERPL-MCNC: 5.98 MG/DL — HIGH (ref 0.5–1.3)
CREAT SERPL-MCNC: 8.14 MG/DL — HIGH (ref 0.5–1.3)
GLUCOSE BLDC GLUCOMTR-MCNC: 110 MG/DL — HIGH (ref 70–99)
GLUCOSE BLDC GLUCOMTR-MCNC: 124 MG/DL — HIGH (ref 70–99)
GLUCOSE BLDC GLUCOMTR-MCNC: 134 MG/DL — HIGH (ref 70–99)
GLUCOSE BLDC GLUCOMTR-MCNC: 70 MG/DL — SIGNIFICANT CHANGE UP (ref 70–99)
GLUCOSE BLDC GLUCOMTR-MCNC: 79 MG/DL — SIGNIFICANT CHANGE UP (ref 70–99)
GLUCOSE BLDC GLUCOMTR-MCNC: 91 MG/DL — SIGNIFICANT CHANGE UP (ref 70–99)
GLUCOSE SERPL-MCNC: 139 MG/DL — HIGH (ref 70–99)
GLUCOSE SERPL-MCNC: 80 MG/DL — SIGNIFICANT CHANGE UP (ref 70–99)
HCT VFR BLD CALC: 32.8 % — LOW (ref 39–50)
HCT VFR BLD CALC: 39.4 % — SIGNIFICANT CHANGE UP (ref 39–50)
HGB BLD-MCNC: 10.6 G/DL — LOW (ref 13–17)
HGB BLD-MCNC: 12.8 G/DL — LOW (ref 13–17)
LACTATE SERPL-SCNC: 1.3 MMOL/L — SIGNIFICANT CHANGE UP (ref 0.7–2)
MCHC RBC-ENTMCNC: 30.5 PG — SIGNIFICANT CHANGE UP (ref 27–34)
MCHC RBC-ENTMCNC: 30.6 PG — SIGNIFICANT CHANGE UP (ref 27–34)
MCHC RBC-ENTMCNC: 32.3 GM/DL — SIGNIFICANT CHANGE UP (ref 32–36)
MCHC RBC-ENTMCNC: 32.5 GM/DL — SIGNIFICANT CHANGE UP (ref 32–36)
MCV RBC AUTO: 94 FL — SIGNIFICANT CHANGE UP (ref 80–100)
MCV RBC AUTO: 94.8 FL — SIGNIFICANT CHANGE UP (ref 80–100)
NRBC # BLD: 0 /100 WBCS — SIGNIFICANT CHANGE UP (ref 0–0)
NRBC # BLD: 0 /100 WBCS — SIGNIFICANT CHANGE UP (ref 0–0)
PLATELET # BLD AUTO: 150 K/UL — SIGNIFICANT CHANGE UP (ref 150–400)
PLATELET # BLD AUTO: 203 K/UL — SIGNIFICANT CHANGE UP (ref 150–400)
POTASSIUM SERPL-MCNC: 3.3 MMOL/L — LOW (ref 3.5–5.3)
POTASSIUM SERPL-MCNC: 3.4 MMOL/L — LOW (ref 3.5–5.3)
POTASSIUM SERPL-SCNC: 3.3 MMOL/L — LOW (ref 3.5–5.3)
POTASSIUM SERPL-SCNC: 3.4 MMOL/L — LOW (ref 3.5–5.3)
RBC # BLD: 3.46 M/UL — LOW (ref 4.2–5.8)
RBC # BLD: 4.19 M/UL — LOW (ref 4.2–5.8)
RBC # FLD: 14.7 % — HIGH (ref 10.3–14.5)
RBC # FLD: 14.8 % — HIGH (ref 10.3–14.5)
SODIUM SERPL-SCNC: 138 MMOL/L — SIGNIFICANT CHANGE UP (ref 135–145)
SODIUM SERPL-SCNC: 139 MMOL/L — SIGNIFICANT CHANGE UP (ref 135–145)
WBC # BLD: 13.1 K/UL — HIGH (ref 3.8–10.5)
WBC # BLD: 13.17 K/UL — HIGH (ref 3.8–10.5)
WBC # FLD AUTO: 13.1 K/UL — HIGH (ref 3.8–10.5)
WBC # FLD AUTO: 13.17 K/UL — HIGH (ref 3.8–10.5)

## 2019-08-24 PROCEDURE — 99291 CRITICAL CARE FIRST HOUR: CPT

## 2019-08-24 RX ORDER — METOCLOPRAMIDE HCL 10 MG
5 TABLET ORAL EVERY 8 HOURS
Refills: 0 | Status: DISCONTINUED | OUTPATIENT
Start: 2019-08-24 | End: 2019-08-26

## 2019-08-24 RX ORDER — POTASSIUM CHLORIDE 20 MEQ
10 PACKET (EA) ORAL
Refills: 0 | Status: DISCONTINUED | OUTPATIENT
Start: 2019-08-24 | End: 2019-08-24

## 2019-08-24 RX ORDER — SODIUM CHLORIDE 9 MG/ML
1000 INJECTION, SOLUTION INTRAVENOUS
Refills: 0 | Status: DISCONTINUED | OUTPATIENT
Start: 2019-08-24 | End: 2019-08-27

## 2019-08-24 RX ORDER — ERYTHROPOIETIN 10000 [IU]/ML
4000 INJECTION, SOLUTION INTRAVENOUS; SUBCUTANEOUS
Refills: 0 | Status: DISCONTINUED | OUTPATIENT
Start: 2019-08-24 | End: 2019-08-28

## 2019-08-24 RX ORDER — MEROPENEM 1 G/30ML
1000 INJECTION INTRAVENOUS EVERY 24 HOURS
Refills: 0 | Status: DISCONTINUED | OUTPATIENT
Start: 2019-08-24 | End: 2019-08-30

## 2019-08-24 RX ORDER — PANTOPRAZOLE SODIUM 20 MG/1
8 TABLET, DELAYED RELEASE ORAL
Qty: 80 | Refills: 0 | Status: DISCONTINUED | OUTPATIENT
Start: 2019-08-24 | End: 2019-08-29

## 2019-08-24 RX ORDER — SUCRALFATE 1 G
1 TABLET ORAL
Refills: 0 | Status: DISCONTINUED | OUTPATIENT
Start: 2019-08-24 | End: 2019-08-30

## 2019-08-24 RX ORDER — ONDANSETRON 8 MG/1
4 TABLET, FILM COATED ORAL ONCE
Refills: 0 | Status: COMPLETED | OUTPATIENT
Start: 2019-08-24 | End: 2019-08-25

## 2019-08-24 RX ORDER — SODIUM CHLORIDE 9 MG/ML
1000 INJECTION, SOLUTION INTRAVENOUS
Refills: 0 | Status: DISCONTINUED | OUTPATIENT
Start: 2019-08-24 | End: 2019-08-24

## 2019-08-24 RX ORDER — CALCIUM GLUCONATE 100 MG/ML
1 VIAL (ML) INTRAVENOUS ONCE
Refills: 0 | Status: DISCONTINUED | OUTPATIENT
Start: 2019-08-24 | End: 2019-08-24

## 2019-08-24 RX ADMIN — FLUDROCORTISONE ACETATE 0.1 MILLIGRAM(S): 0.1 TABLET ORAL at 06:47

## 2019-08-24 RX ADMIN — Medication 250 MILLIGRAM(S): at 14:09

## 2019-08-24 RX ADMIN — SODIUM CHLORIDE 100 MILLILITER(S): 9 INJECTION, SOLUTION INTRAVENOUS at 23:45

## 2019-08-24 RX ADMIN — MEROPENEM 100 MILLIGRAM(S): 1 INJECTION INTRAVENOUS at 22:06

## 2019-08-24 RX ADMIN — PIPERACILLIN AND TAZOBACTAM 25 GRAM(S): 4; .5 INJECTION, POWDER, LYOPHILIZED, FOR SOLUTION INTRAVENOUS at 18:32

## 2019-08-24 RX ADMIN — ERYTHROPOIETIN 4000 UNIT(S): 10000 INJECTION, SOLUTION INTRAVENOUS; SUBCUTANEOUS at 09:53

## 2019-08-24 RX ADMIN — PANTOPRAZOLE SODIUM 10 MG/HR: 20 TABLET, DELAYED RELEASE ORAL at 20:55

## 2019-08-24 RX ADMIN — Medication 5 MILLIGRAM(S): at 18:31

## 2019-08-24 RX ADMIN — SODIUM CHLORIDE 60 MILLILITER(S): 9 INJECTION, SOLUTION INTRAVENOUS at 20:55

## 2019-08-24 RX ADMIN — PIPERACILLIN AND TAZOBACTAM 25 GRAM(S): 4; .5 INJECTION, POWDER, LYOPHILIZED, FOR SOLUTION INTRAVENOUS at 06:45

## 2019-08-24 RX ADMIN — Medication 30 MILLIGRAM(S): at 06:47

## 2019-08-24 RX ADMIN — Medication 5 MILLIGRAM(S): at 22:06

## 2019-08-24 NOTE — CHART NOTE - NSCHARTNOTEFT_GEN_A_CORE
EVENT: Tachycardia bet 106-110 b/min  - Pt's with sinus tachycardia between 106-110    OBJECTIVE:  Vital Signs Last 24 Hrs  T(C): 37.7 (23 Aug 2019 20:40), Max: 37.7 (23 Aug 2019 20:40)  T(F): 99.9 (23 Aug 2019 20:40), Max: 99.9 (23 Aug 2019 20:40)  HR: 97 (23 Aug 2019 23:47) (97 - 113)  BP: 125/70 (23 Aug 2019 23:47) (125/70 - 163/81)  BP(mean): --  RR: 17 (23 Aug 2019 20:40) (16 - 18)  SpO2: 99% (23 Aug 2019 20:40) (94% - 99%)    FOCUSED PHYSICAL EXAM:  Neuro: awake, alert, oriented x 3. No neuro deficit  Cardiovascular: Pulses +2 B/L in lower and upper extremities, HR -> Tachycardic, BP stable, No edema.  Respiratory: Respirations regular, unlabored, breath sounds clear B/L.   GI: Abdomen soft, non-tender, positive bowel sounds.  : no bladder distention noted. No complaints at this time.  Skin: Dry, intact, no bruising, no diaphoresis.    LABS:                        10.6   12.44 )-----------( 102      ( 22 Aug 2019 18:08 )             32.9     08-22    137  |  98  |  13  ----------------------------<  76  4.2   |  31  |  3.62<H>    Ca    8.5      22 Aug 2019 18:08    TPro  7.1  /  Alb  3.0<L>  /  TBili  0.7  /  DBili  x   /  AST  20  /  ALT  13  /  AlkPhos  84  08-22      EKG:   IMGAGING:    ASSESSMENT:  HPI:  59 y/o male from [facility], with PMHx of DM (on insulin), partial vision loss, ESRD (on TTS HD via LUE AVF), s/p left BKA and adrenal insufficiency is sent to the ED for fever during his dialysis session today. Patient reports he completed 4 hours of dialysis PTA. He reports his fever started today and is associated with chills. He denies any cough, shortness of breath, chest pain, abdominal pain, nausea, vomiting, diarrhea, dysuria or increased urinary frequency.    ED course: s/p vanc and zosyn x 1. s/p 1L LR bolus x2.    GOC as per [facility] records: Full code. (22 Aug 2019 22:20)      PLAN: Metoprolol 2.5mg, IVP x 1 dose ordered    FOLLOW UP / RESULT:  HR decreased to 97

## 2019-08-24 NOTE — PROGRESS NOTE ADULT - SUBJECTIVE AND OBJECTIVE BOX
Patient is a 58y old  Male who presents with a chief complaint of sepsis (24 Aug 2019 19:31)    PATIENT IS SEEN AND EXAMINED IN MEDICAL FLOOR.    ALLERGIES:  fish (Rash)  liver (Anaphylaxis)  No Known Drug Allergies    Daily Weight in k.8 (24 Aug 2019 09:15)    VITALS:    Vital Signs Last 24 Hrs  T(C): 37.3 (24 Aug 2019 19:36), Max: 40 (24 Aug 2019 09:15)  T(F): 99.1 (24 Aug 2019 19:36), Max: 104 (24 Aug 2019 09:15)  HR: 118 (24 Aug 2019 18:15) (97 - 118)  BP: 135/71 (24 Aug 2019 18:15) (108/65 - 160/82)  BP(mean): --  RR: 18 (24 Aug 2019 18:15) (17 - 18)  SpO2: 98% (24 Aug 2019 18:15) (92% - 98%)    LABS:    CBC Full  -  ( 24 Aug 2019 19:11 )  WBC Count : 13.17 K/uL  RBC Count : 4.19 M/uL  Hemoglobin : 12.8 g/dL  Hematocrit : 39.4 %  Platelet Count - Automated : 203 K/uL  Mean Cell Volume : 94.0 fl  Mean Cell Hemoglobin : 30.5 pg  Mean Cell Hemoglobin Concentration : 32.5 gm/dL  Auto Neutrophil # : x  Auto Lymphocyte # : x  Auto Monocyte # : x  Auto Eosinophil # : x  Auto Basophil # : x  Auto Neutrophil % : x  Auto Lymphocyte % : x  Auto Monocyte % : x  Auto Eosinophil % : x  Auto Basophil % : x      08-24    139  |  95<L>  |  41<H>  ----------------------------<  80  3.4<L>   |  25  |  8.14<H>    Ca    8.6      24 Aug 2019 09:19      CAPILLARY BLOOD GLUCOSE    POCT Blood Glucose.: 134 mg/dL (24 Aug 2019 21:10)  POCT Blood Glucose.: 110 mg/dL (24 Aug 2019 18:16)  POCT Blood Glucose.: 124 mg/dL (24 Aug 2019 16:37)  POCT Blood Glucose.: 79 mg/dL (24 Aug 2019 10:53)  POCT Blood Glucose.: 91 mg/dL (24 Aug 2019 08:09)  POCT Blood Glucose.: 70 mg/dL (24 Aug 2019 06:44)  POCT Blood Glucose.: 70 mg/dL (23 Aug 2019 23:37)    Creatinine Trend: 8.14<--, 3.62<--  I&O's Summary    .Blood   @ 01:18   Growth in aerobic bottle: Klebsiella pneumoniae  Susceptibility to follow.  "Due to technical problems, Proteus sp. will Not be reported as part of  the BCID panel until further notice"  ***Blood Panel PCR results on this specimen are available  approximately 3 hours after the Gram stain result.***  Gram stain, PCR, and/or culture results may not always  correspond due to difference in methodologies.  ************************************************************  This PCR assay was performed using [employer].  The following targets are tested for: Enterococcus,  vancomycin resistant enterococci, Listeria monocytogenes,  coagulase negative staphylococci, S. aureus,  methicillin resistant S. aureus, Streptococcus agalactiae  (Group B), S. pneumoniae, S. pyogenes (Group A),  Acinetobacter baumannii, Enterobacter cloacae, E. coli,  Klebsiella oxytoca, K. pneumoniae, Proteus sp.,  Serratia marcescens, Haemophilus influenzae,  Neisseria meningitidis, Pseudomonas aeruginosa, Candida  albicans, C. glabrata, C krusei, C parapsilosis,  C. tropicalis and the KPC resistance gene.  --  Blood Culture PCR    MEDICATIONS:    MEDICATIONS  (STANDING):  chlorhexidine 2% Cloths 1 Application(s) Topical daily  cyanocobalamin 1000 MICROGram(s) Oral daily  diphenhydrAMINE 25 milliGRAM(s) Oral at bedtime  epoetin beverley Injectable 4000 Unit(s) IV Push <User Schedule>  fludroCORTISONE 0.1 milliGRAM(s) Oral two times a day  folic acid 1 milliGRAM(s) Oral daily  gabapentin 200 milliGRAM(s) Oral daily  insulin glargine Injectable (LANTUS) 6 Unit(s) SubCutaneous at bedtime  insulin lispro (HumaLOG) corrective regimen sliding scale   SubCutaneous three times a day before meals  insulin lispro Injectable (HumaLOG) 3 Unit(s) SubCutaneous three times a day before meals  lactated ringers. 1000 milliLiter(s) (60 mL/Hr) IV Continuous <Continuous>  meropenem  IVPB 1000 milliGRAM(s) IV Intermittent every 24 hours  metoclopramide Injectable 5 milliGRAM(s) IV Push every 8 hours  mupirocin 2% Nasal 1 Application(s) Nasal every 12 hours  NIFEdipine XL 30 milliGRAM(s) Oral daily  pantoprazole Infusion 8 mG/Hr (10 mL/Hr) IV Continuous <Continuous>  simvastatin 40 milliGRAM(s) Oral at bedtime  sucralfate suspension 1 Gram(s) Oral four times a day      MEDICATIONS  (PRN):  acetaminophen   Tablet .. 650 milliGRAM(s) Oral every 6 hours PRN Temp greater or equal to 38C (100.4F), Mild Pain (1 - 3)      REVIEW OF SYSTEMS:                           ALL ROS DONE [ X   ]    CONSTITUTIONAL:  LETHARGIC [   ], FEVER [   ], UNRESPONSIVE [   ]  CVS:  CP  [   ], SOB, [   ], PALPITATIONS [   ], DIZZYNESS [   ]  RS: COUGH [   ], SPUTUM [   ]  GI: ABDOMINAL PAIN [   ], NAUSEA [   ], VOMITINGS [   ], DIARRHEA [   ], CONSTIPATION [   ]  :  DYSURIA [   ], NOCTURIA [   ], INCREASED FREQUENCY [   ], DRIBLING [   ],  SKELETAL: PAINFUL JOINTS [   ], SWOLLEN JOINTS [   ], NECK ACHE [   ], LOW BACK ACHE [   ],  SKIN : ULCERS [   ], RASH [   ], ITCHING [   ]  CNS: HEAD ACHE [   ], DOUBLE VISION [   ], BLURRED VISION [   ], AMS / CONFUSION [   ], SEIZURES [   ], WEAKNESS [   ],TINGLING / NUMBNESS [   ]    PHYSICAL EXAMINATION:  GENERAL APPEARANCE: NO DISTRESS  HEENT:  NO PALLOR, NO  JVD,  NO   NODES, NECK SUPPLE  CVS: S1 +, S2 +,   RS: AEEB,  OCCASIONAL  RALES +,   NO RONCHI  ABD: SOFT, NT, NO, BS +  EXT: NO PE                                                       rue avf +, bruit +  SKIN: WARM,      SKELETAL:  ROM ACCEPTABLE  CNS:  AAO X 2-3   , NO  DEFICITS    RADIOLOGY :    < from: Xray Chest 1 View-PORTABLE IMMEDIATE (19 @ 18:27) >    IMPRESSION: Normal AP chest.    < end of copied text >      ASSESSMENT :     Fever  Vision loss of left eye  Osteoporosis  Osteoarthritis  Contracture of hand  Diabetic neuropathy  Diabetes mellitus  Hyperparathyroidism  Spinal stenosis of lumbosacral region  Peripheral vascular disease  HLD (hyperlipidemia)  Coronary artery disease  Glaucoma  Anemia  CKD (chronic kidney disease)  Adrenal insufficiency  Hypertension  History of left cataract extraction  History of right cataract extraction  Below knee amputation status, left      PLAN:  HPI:  59 y/o male from [facility], with PMHx of DM (on insulin), partial vision loss, ESRD (on TTS HD via LUE AVF), s/p left BKA and adrenal insufficiency is sent to the ED for fever during his dialysis session today. Patient reports he completed 4 hours of dialysis PTA. He reports his fever started today and is associated with chills. He denies any cough, shortness of breath, chest pain, abdominal pain, nausea, vomiting, diarrhea, dysuria or increased urinary frequency.    ED course: s/p vanc and zosyn x 1. s/p 1L LR bolus x2.    GOC as per [facility] records: Full code. (22 Aug 2019 22:20)    - VOMITING AND NAUSEA - NPO, CBC, GI CONSULT, ADDED IV REGLAN AND PROTONIX. DCD ASA, SQ HEPARIN  - UTI AND BACTEREMIA ON IV ZOSYN. DCD VANCOMYCIN. . AWAITING ON FINAL  URINE CXS.  KLEBSIELLA IN BLOOD, ONE BOTTLE.  RPT BLOOD CXS  - ESRD ON HD  - GI AND DVT PROPHYLAXIS   -

## 2019-08-24 NOTE — PROGRESS NOTE ADULT - ASSESSMENT
Patient is a 58y old  Male from [facility], with DM (on insulin), partial vision loss, ESRD (on TTS HD via LUE AVF), s/p left BKA and adrenal insufficiency has sent to the ER for  evaluation of fever and chills, during his dialysis session.  He has no cough,  No shortness of breath, No abdominal pain, nausea, vomiting, diarrhea, or dysuria. On admission, he found to have Fever, tachycardia, Leukocytosis and positive Urine analysis.  The CXR shows Lungs are clear. He has started on Zosyn and Vancomycin , and The ID consult requested to assist with further evaluation  and antibiotic management.     # Sepsis ( Fever + tachycardia + leukocytosis )  # UTI  # High grade Gram Negative Bacteremia - Klebsiella - Most likely source is     would recommend:    1. Change zosyn to Meropenem since spiking fever on Zosyn  2. Follow up final Blood cultures for sensitivity of Klebsiella  3. Follow up Repeat Blood cultures to document clearing the blood stream   4. Monitor WBC count  5. Management of hematemesis as per Primary, start IV protonix    d/w House staff, Dr. Garay, Nursing staff and Patient     will follow the patient with you

## 2019-08-24 NOTE — PROGRESS NOTE ADULT - SUBJECTIVE AND OBJECTIVE BOX
[facility] : Progress Note :: [phone], (office [phone]),   Dr Mosher / Dr Washington / Dr Young / Dr Doll / Dr Varsha TURCIOS / Dr Tello / Dr Garcia / Dr Larry qiu  _____________________________________________________________________________________________  Seen on HD; stable.    fish (Rash)  liver (Anaphylaxis)  No Known Drug Allergies    Hospital Medications:   MEDICATIONS  (STANDING):  aspirin enteric coated 81 milliGRAM(s) Oral daily  chlorhexidine 2% Cloths 1 Application(s) Topical daily  cyanocobalamin 1000 MICROGram(s) Oral daily  diphenhydrAMINE 25 milliGRAM(s) Oral at bedtime  epoetin beverley Injectable 4000 Unit(s) IV Push <User Schedule>  fludroCORTISONE 0.1 milliGRAM(s) Oral two times a day  folic acid 1 milliGRAM(s) Oral daily  gabapentin 200 milliGRAM(s) Oral daily  heparin  Injectable 5000 Unit(s) SubCutaneous every 12 hours  insulin glargine Injectable (LANTUS) 6 Unit(s) SubCutaneous at bedtime  insulin lispro (HumaLOG) corrective regimen sliding scale   SubCutaneous three times a day before meals  insulin lispro Injectable (HumaLOG) 3 Unit(s) SubCutaneous three times a day before meals  mupirocin 2% Nasal 1 Application(s) Nasal every 12 hours  NIFEdipine XL 30 milliGRAM(s) Oral daily  piperacillin/tazobactam IVPB.. 3.375 Gram(s) IV Intermittent every 12 hours  simvastatin 40 milliGRAM(s) Oral at bedtime  vancomycin  IVPB 1000 milliGRAM(s) IV Intermittent <User Schedule>        VITALS:  T(F): 98.8 (19 @ 14:12), Max: 104 (19 @ 09:15)  HR: 110 (19 @ 14:12)  BP: 108/65 (19 @ 14:12)  RR: 17 (19 @ 14:12)  SpO2: 97% (19 @ 14:12)  Wt(kg): --      PHYSICAL EXAM:  Constitutional: NAD  HEENT: anicteric sclera, oropharynx clear.  Neck: No JVD  Respiratory: CTAB, no wheezes, rales or rhonchi  Cardiovascular: S1, S2, RRR  Gastrointestinal: BS+, soft, NT/ND  Extremities: LT BKA peripheral edema  Neurological: A/O x 3, no focal deficits  : No CVA tenderness. No [place].   AVF cannulated      LABS:      139  |  95<L>  |  41<H>  ----------------------------<  80  3.4<L>   |  25  |  8.14<H>    Ca    8.6      24 Aug 2019 09:19    TPro  7.1  /  Alb  3.0<L>  /  TBili  0.7  /  DBili      /  AST  20  /  ALT  13  /  AlkPhos  84  08-    Creatinine Trend: 8.14 <--, 3.62 <--                        10.6   13.10 )-----------( 150      ( 24 Aug 2019 09:19 )             32.8     Urine Studies:  Urinalysis Basic - ( 23 Aug 2019 15:52 )    Color: Yellow / Appearance: Slightly Turbid / S.010 / pH:   Gluc:  / Ketone: Negative  / Bili: Negative / Urobili: Negative   Blood:  / Protein: 100 / Nitrite: Negative   Leuk Esterase: Moderate / RBC: 25-50 /HPF / WBC >50 /HPF   Sq Epi:  / Non Sq Epi: Few /HPF / Bacteria: Moderate /HPF        RADIOLOGY & ADDITIONAL STUDIES:

## 2019-08-24 NOTE — PROGRESS NOTE ADULT - ASSESSMENT
Patient is a 58y Male with ESRD on HD TTS at [facility] via AVF. Presented to dialysis feeling fine, afebrile. Into dialysis had rigors and chills with fever of 103 associated with vomiting. blood cultures were drawn ( which are resulting negative as of now). given vanco and gentamicin. initially refused to come to hospital but agreed later. in ED found with fever, leucocytosis and vomiting. blood cultures  are growing gram negative rods. renal consulted for ESRD     # ESRD TTS. SEEN ON HD, STABLE  # FEVER WITH GRAM NEGATIVE BACTREMIA- PER Infectious disease specialist  # ABENIA OF CKD. STABLE. GIVE AYAN WITH HD  # HTN BP CONTROLLED   # PHOS AT GOAL

## 2019-08-24 NOTE — CONSULT NOTE ADULT - ASSESSMENT
57 y/o male from [facility], with PMHx of DM (on insulin), partial vision loss, ESRD (on TTS HD via LUE AVF), s/p left BKA and adrenal insufficiency is sent to the ED for fever;  Patient was admitted with sepsis 2/2 UTI   ICU team consulted for coffee ground emesis     Currently patient is afebrile, HD stable, Mild tachycardia   CBC showed leucocytosis 13k, H/H 10.6/33  BMP BUN/cr 41/Cr 8.14  AG 19, HCO2 25  Blood Cx klebsiella  UA positive for esterase and WBC   CXR clear

## 2019-08-24 NOTE — CONSULT NOTE ADULT - SUBJECTIVE AND OBJECTIVE BOX
Patient is a 58y old  Male who presents with a chief complaint of sepsis (24 Aug 2019 17:28)      Initial HPI on admission:  HPI:  57 y/o male from [facility], with PMHx of DM (on insulin), partial vision loss, ESRD (on TTS HD via LUE AVF), s/p left BKA and adrenal insufficiency is sent to the ED for fever during his dialysis session today. Patient reports he completed 4 hours of dialysis PTA. He reports his fever started today and is associated with chills. He denies any cough, shortness of breath, chest pain, abdominal pain, nausea, vomiting, diarrhea, dysuria or increased urinary frequency.    ED course: s/p vanc and zosyn x 1. s/p 1L LR bolus x2.    GOC as per [facility] records: Full code. (22 Aug 2019 22:20)      BRIEF HOSPITAL COURSE: Patient was admitted with sepsis 2/2 UTI and Klebsiella bacteremia, currently been treated with IV zosyn. ICU Team was consulted for 1 episode of coffee ground emesis. Patient reports of nausea, had multiple episodes of NBNB vomiting overnight, he refused medication, Patient had HD today, post HD patient had 2 episodes of NBNB vomiting and then had 1 episode of Coffee ground emesis. Patient denies abdominal pain, chest pain, SOB, headaches, blurring of vision, BRPR, sob or palpitation. Patient was not on any NSAID's or blood thinner, Denies h/o PUD or recent EGD or colonoscopy.     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  Diabetic neuropathy  Diabetes mellitus  Hyperparathyroidism  Spinal stenosis of lumbosacral region  Peripheral vascular disease  HLD (hyperlipidemia)  Coronary artery disease  Glaucoma  Anemia  CKD (chronic kidney disease)  Adrenal insufficiency  Hypertension  History of left cataract extraction  History of right cataract extraction  Below knee amputation status, left    Allergies    fish (Rash)  liver (Anaphylaxis)  No Known Drug Allergies    Intolerances      FAMILY HISTORY:  Family history of AIDS  Asthma  Family history of cirrhosis of liver          Medications:  acetaminophen   Tablet .. 650 milliGRAM(s) Oral every 6 hours PRN  chlorhexidine 2% Cloths 1 Application(s) Topical daily  cyanocobalamin 1000 MICROGram(s) Oral daily  diphenhydrAMINE 25 milliGRAM(s) Oral at bedtime  epoetin beverley Injectable 4000 Unit(s) IV Push <User Schedule>  fludroCORTISONE 0.1 milliGRAM(s) Oral two times a day  folic acid 1 milliGRAM(s) Oral daily  gabapentin 200 milliGRAM(s) Oral daily  insulin glargine Injectable (LANTUS) 6 Unit(s) SubCutaneous at bedtime  insulin lispro (HumaLOG) corrective regimen sliding scale   SubCutaneous three times a day before meals  insulin lispro Injectable (HumaLOG) 3 Unit(s) SubCutaneous three times a day before meals  metoclopramide Injectable 5 milliGRAM(s) IV Push every 8 hours  mupirocin 2% Nasal 1 Application(s) Nasal every 12 hours  NIFEdipine XL 30 milliGRAM(s) Oral daily  pantoprazole Infusion 8 mG/Hr IV Continuous <Continuous>  piperacillin/tazobactam IVPB.. 3.375 Gram(s) IV Intermittent every 12 hours  simvastatin 40 milliGRAM(s) Oral at bedtime  sucralfate suspension 1 Gram(s) Oral four times a day      vent settings      Vital Signs Last 24 Hrs  T(C): 37.1 (24 Aug 2019 14:12), Max: 40 (24 Aug 2019 09:15)  T(F): 98.8 (24 Aug 2019 14:12), Max: 104 (24 Aug 2019 09:15)  HR: 110 (24 Aug 2019 14:12) (97 - 113)  BP: 108/65 (24 Aug 2019 14:12) (108/65 - 160/82)  BP(mean): --  RR: 17 (24 Aug 2019 14:12) (17 - 18)  SpO2: 97% (24 Aug 2019 14:12) (92% - 99%)              LABS:                        10.6   13.10 )-----------( 150      ( 24 Aug 2019 09:19 )             32.8     08-24    139  |  95<L>  |  41<H>  ----------------------------<  80  3.4<L>   |  25  |  8.14<H>    Ca    8.6      24 Aug 2019 09:19            CAPILLARY BLOOD GLUCOSE      POCT Blood Glucose.: 110 mg/dL (24 Aug 2019 18:16)      Urinalysis Basic - ( 23 Aug 2019 15:52 )    Color: Yellow / Appearance: Slightly Turbid / S.010 / pH: x  Gluc: x / Ketone: Negative  / Bili: Negative / Urobili: Negative   Blood: x / Protein: 100 / Nitrite: Negative   Leuk Esterase: Moderate / RBC: 25-50 /HPF / WBC >50 /HPF   Sq Epi: x / Non Sq Epi: Few /HPF / Bacteria: Moderate /HPF      CULTURES:  Culture Results:   Growth in aerobic bottle: Klebsiella pneumoniae  Susceptibility to follow.  "Due to technical problems, Proteus sp. will Not be reported as part of  the BCID panel until further notice"  ***Blood Panel PCR results on this specimen are available  approximately 3 hours after the Gram stain result.***  Gram stain, PCR, and/or culture results may not always  correspond due to difference in methodologies.  ************************************************************  This PCR assay was performed using [employer].  The following targets are tested for: Enterococcus,  vancomycin resistant enterococci, Listeria monocytogenes,  coagulase negative staphylococci, S. aureus,  methicillin resistant S. aureus, Streptococcus agalactiae  (Group B), S. pneumoniae, S. pyogenes (Group A),  Acinetobacter baumannii, Enterobacter cloacae, E. coli,  Klebsiella oxytoca, K. pneumoniae, Proteus sp.,  Serratia marcescens, Haemophilus influenzae,  Neisseria meningitidis, Pseudomonas aeruginosa, Candida  albicans, C. glabrata, C krusei, C parapsilosis,  C. tropicalis and the KPC resistance gene. ( @ 01:18)  Culture Results:   No growth to date. ( @ 01:18)    .Blood      Physical Examination:    >>>PHYSICAL EXAM:    GENERAL APPEARANCE: NO DISTRESS  Constitutional: NAD  HEENT: PERRLA,  no icteric sclera and mild pallor of conjunctiva noted  Neck: No JVD, thyromegaly or adenopathy  Respiratory:  no rales, wheezing or rhonchi  Cardiovascular: S1 and S2 normally heard  Gastrointestinal: soft, nondistended, nontender and normal bowel sounds heard  Extremities: No peripheral edema or cyanosis  Neurological: A/O x 3, no focal deficits  : No flank or cva tenderness palpated.  Skin: No rashes        RADIOLOGY REVIEWED ***        < from: Xray Chest 1 View-PORTABLE IMMEDIATE (19 @ 18:27) >    EXAM:  XR CHEST PORTABLE IMMED 1V                            PROCEDURE DATE:  2019          INTERPRETATION:  XR CHEST IMMEDIATE    Single AP view    HISTORY:  fever    Comparison: Chest x-ray 2019      The cardiac silhouette is within normal limits. The lungs are clear. No   pleural abnormality.    IMPRESSION: Normal AP chest.    < end of copied text >

## 2019-08-24 NOTE — PROGRESS NOTE ADULT - SUBJECTIVE AND OBJECTIVE BOX
Patient is seen and examined at the bed side, spiked fever and currently is afebrile. he has hematemesis. The Leukocytosis is worsening.        REVIEW OF SYSTEMS: All other review systems are negative         ALLERGIES:  fish (Rash)  liver (Anaphylaxis)  No Known Drug Allergies        Vital Signs Last 24 Hrs  T(C): 37.1 (24 Aug 2019 14:12), Max: 40 (24 Aug 2019 09:15)  T(F): 98.8 (24 Aug 2019 14:12), Max: 104 (24 Aug 2019 09:15)  HR: 110 (24 Aug 2019 14:12) (97 - 113)  BP: 108/65 (24 Aug 2019 14:12) (108/65 - 160/82)  BP(mean): --  RR: 17 (24 Aug 2019 14:12) (17 - 18)  SpO2: 97% (24 Aug 2019 14:12) (92% - 99%)        PHYSICAL EXAM:  GENERAL: Not in acute distress   CHEST/LUNG:  Air entry bilaterally  HEART: s1 and s2 present  ABDOMEN:  Nontender and  Nondistended  EXTREMITIES: Left BKA  CNS: Awake and Alert        LABS:                        12.8   13.17 )-----------( 203      ( 24 Aug 2019 19:11 )             39.4                           10.6   12.44 )-----------( 102      ( 22 Aug 2019 18:08 )             32.9       08-24    139  |  95<L>  |  41<H>  ----------------------------<  80  3.4<L>   |  25  |  8.14<H>    Ca    8.6      24 Aug 2019 09:19      08-22    137  |  98  |  13  ----------------------------<  76  4.2   |  31  |  3.62<H>    Ca    8.5      22 Aug 2019 18:08    TPro  7.1  /  Alb  3.0<L>  /  TBili  0.7  /  DBili  x   /  AST  20  /  ALT  13  /  AlkPhos  84  08-22  PT/INR - ( 22 Aug 2019 18:08 )   PT: 13.4 sec;   INR: 1.20 ratio    PTT - ( 22 Aug 2019 18:08 )  PTT:32.5 sec        CAPILLARY BLOOD GLUCOSE  POCT Blood Glucose.: 78 mg/dL (23 Aug 2019 11:30)  POCT Blood Glucose.: 88 mg/dL (23 Aug 2019 07:58)  POCT Blood Glucose.: 125 mg/dL (22 Aug 2019 23:34)        Urinalysis Basic - ( 23 Aug 2019 15:52 )  Color: Yellow / Appearance: Slightly Turbid / S.010 / pH: x  Gluc: x / Ketone: Negative  / Bili: Negative / Urobili: Negative   Blood: x / Protein: 100 / Nitrite: Negative   Leuk Esterase: Moderate / RBC: 25-50 /HPF / WBC >50 /HPF   Sq Epi: x / Non Sq Epi: Few /HPF / Bacteria: Moderate /HPF        MEDICATIONS  (STANDING):  chlorhexidine 2% Cloths 1 Application(s) Topical daily  cyanocobalamin 1000 MICROGram(s) Oral daily  diphenhydrAMINE 25 milliGRAM(s) Oral at bedtime  epoetin beverley Injectable 4000 Unit(s) IV Push <User Schedule>  fludroCORTISONE 0.1 milliGRAM(s) Oral two times a day  folic acid 1 milliGRAM(s) Oral daily  gabapentin 200 milliGRAM(s) Oral daily  insulin glargine Injectable (LANTUS) 6 Unit(s) SubCutaneous at bedtime  insulin lispro (HumaLOG) corrective regimen sliding scale   SubCutaneous three times a day before meals  insulin lispro Injectable (HumaLOG) 3 Unit(s) SubCutaneous three times a day before meals  metoclopramide Injectable 5 milliGRAM(s) IV Push every 8 hours  mupirocin 2% Nasal 1 Application(s) Nasal every 12 hours  NIFEdipine XL 30 milliGRAM(s) Oral daily  pantoprazole Infusion 8 mG/Hr (10 mL/Hr) IV Continuous <Continuous>  piperacillin/tazobactam IVPB.. 3.375 Gram(s) IV Intermittent every 12 hours  simvastatin 40 milliGRAM(s) Oral at bedtime  sucralfate suspension 1 Gram(s) Oral four times a day    MEDICATIONS  (PRN):  acetaminophen   Tablet .. 650 milliGRAM(s) Oral every 6 hours PRN Temp greater or equal to 38C (100.4F), Mild Pain (1 - 3)        RADIOLOGY & ADDITIONAL TESTS:    19 : Xray Chest 1 View-PORTABLE IMMEDIATE (19 @ 18:27) The cardiac silhouette is within normal limits. The lungs are clear. No pleural abnormality.      MICROBIOLOGY DATA:    Urine Microscopic-Add On (NC) (19 @ 15:52)    Bacteria: Moderate /HPF    Comment - Urine: moderate amorphous sediments present    Epithelial Cells: Few /HPF    Red Blood Cell - Urine: 25-50 /HPF    White Blood Cell - Urine: >50 /HPF      Culture - Blood (19 @ 01:18)    Specimen Source: .Blood    Culture Results:   No growth to date.      Culture - Blood (19 @ 01:18)    -  Klebsiella pneumoniae: Detec    Gram Stain:   Growth in aerobic bottle: Gram Variable Rods    Specimen Source: .Blood    Organism: Blood Culture PCR    Culture Results:   Growth in aerobic bottle: Klebsiella pneumoniae  Susceptibility to follow.  "Due to technical problems, Proteus sp. will Not be reported as part of  the BCID panel until further notice"  ***Blood Panel PCR results on this specimen are available  approximately 3 hours after the Gram stain result.***  Gram stain, PCR, and/or culture results may not always  correspond due to difference in methodologies.  ************************************************************  This PCR assay was performed using [employer].  The following targets are tested for: Enterococcus,  vancomycin resistant enterococci, Listeria monocytogenes,  coagulase negative staphylococci, S. aureus,  methicillin resistant S. aureus, Streptococcus agalactiae  (Group B), S. pneumoniae, S. pyogenes (Group A),  Acinetobacter baumannii, Enterobacter cloacae, E. coli,  Klebsiella oxytoca, K. pneumoniae, Proteus sp.,  Serratia marcescens, Haemophilus influenzae,  Neisseria meningitidis, Pseudomonas aeruginosa, Candida  albicans, C. glabrata, C krusei, C parapsilosis,  C. tropicalis and the KPC resistance gene.    Organism Identification: Blood Culture PCR    Method Type: PCR

## 2019-08-25 LAB
-  AMIKACIN: SIGNIFICANT CHANGE UP
-  AMPICILLIN/SULBACTAM: SIGNIFICANT CHANGE UP
-  AMPICILLIN: SIGNIFICANT CHANGE UP
-  AZTREONAM: SIGNIFICANT CHANGE UP
-  CEFAZOLIN: SIGNIFICANT CHANGE UP
-  CEFEPIME: SIGNIFICANT CHANGE UP
-  CEFOXITIN: SIGNIFICANT CHANGE UP
-  CEFTRIAXONE: SIGNIFICANT CHANGE UP
-  CIPROFLOXACIN: SIGNIFICANT CHANGE UP
-  ERTAPENEM: SIGNIFICANT CHANGE UP
-  GENTAMICIN: SIGNIFICANT CHANGE UP
-  IMIPENEM: SIGNIFICANT CHANGE UP
-  LEVOFLOXACIN: SIGNIFICANT CHANGE UP
-  MEROPENEM: SIGNIFICANT CHANGE UP
-  PIPERACILLIN/TAZOBACTAM: SIGNIFICANT CHANGE UP
-  TOBRAMYCIN: SIGNIFICANT CHANGE UP
-  TRIMETHOPRIM/SULFAMETHOXAZOLE: SIGNIFICANT CHANGE UP
ALBUMIN SERPL ELPH-MCNC: 3.4 G/DL — LOW (ref 3.5–5)
ALP SERPL-CCNC: 110 U/L — SIGNIFICANT CHANGE UP (ref 40–120)
ALT FLD-CCNC: 16 U/L DA — SIGNIFICANT CHANGE UP (ref 10–60)
ANION GAP SERPL CALC-SCNC: 18 MMOL/L — HIGH (ref 5–17)
AST SERPL-CCNC: 17 U/L — SIGNIFICANT CHANGE UP (ref 10–40)
BASOPHILS # BLD AUTO: 0.03 K/UL — SIGNIFICANT CHANGE UP (ref 0–0.2)
BASOPHILS NFR BLD AUTO: 0.3 % — SIGNIFICANT CHANGE UP (ref 0–2)
BILIRUB SERPL-MCNC: 0.6 MG/DL — SIGNIFICANT CHANGE UP (ref 0.2–1.2)
BUN SERPL-MCNC: 40 MG/DL — HIGH (ref 7–18)
CALCIUM SERPL-MCNC: 9 MG/DL — SIGNIFICANT CHANGE UP (ref 8.4–10.5)
CHLORIDE SERPL-SCNC: 95 MMOL/L — LOW (ref 96–108)
CO2 SERPL-SCNC: 24 MMOL/L — SIGNIFICANT CHANGE UP (ref 22–31)
CREAT SERPL-MCNC: 8.68 MG/DL — HIGH (ref 0.5–1.3)
CULTURE RESULTS: SIGNIFICANT CHANGE UP
EOSINOPHIL # BLD AUTO: 0 K/UL — SIGNIFICANT CHANGE UP (ref 0–0.5)
EOSINOPHIL NFR BLD AUTO: 0 % — SIGNIFICANT CHANGE UP (ref 0–6)
GLUCOSE BLDC GLUCOMTR-MCNC: 147 MG/DL — HIGH (ref 70–99)
GLUCOSE BLDC GLUCOMTR-MCNC: 178 MG/DL — HIGH (ref 70–99)
GLUCOSE BLDC GLUCOMTR-MCNC: 203 MG/DL — HIGH (ref 70–99)
GLUCOSE BLDC GLUCOMTR-MCNC: 250 MG/DL — HIGH (ref 70–99)
GLUCOSE BLDC GLUCOMTR-MCNC: 253 MG/DL — HIGH (ref 70–99)
GLUCOSE BLDC GLUCOMTR-MCNC: 260 MG/DL — HIGH (ref 70–99)
GLUCOSE BLDC GLUCOMTR-MCNC: 281 MG/DL — HIGH (ref 70–99)
GLUCOSE SERPL-MCNC: 203 MG/DL — HIGH (ref 70–99)
HCT VFR BLD CALC: 40.6 % — SIGNIFICANT CHANGE UP (ref 39–50)
HGB BLD-MCNC: 13.3 G/DL — SIGNIFICANT CHANGE UP (ref 13–17)
IMM GRANULOCYTES NFR BLD AUTO: 0.7 % — SIGNIFICANT CHANGE UP (ref 0–1.5)
LYMPHOCYTES # BLD AUTO: 0.49 K/UL — LOW (ref 1–3.3)
LYMPHOCYTES # BLD AUTO: 4.4 % — LOW (ref 13–44)
MAGNESIUM SERPL-MCNC: 2.7 MG/DL — HIGH (ref 1.6–2.6)
MCHC RBC-ENTMCNC: 30.5 PG — SIGNIFICANT CHANGE UP (ref 27–34)
MCHC RBC-ENTMCNC: 32.8 GM/DL — SIGNIFICANT CHANGE UP (ref 32–36)
MCV RBC AUTO: 93.1 FL — SIGNIFICANT CHANGE UP (ref 80–100)
METHOD TYPE: SIGNIFICANT CHANGE UP
MONOCYTES # BLD AUTO: 0.69 K/UL — SIGNIFICANT CHANGE UP (ref 0–0.9)
MONOCYTES NFR BLD AUTO: 6.2 % — SIGNIFICANT CHANGE UP (ref 2–14)
NEUTROPHILS # BLD AUTO: 9.84 K/UL — HIGH (ref 1.8–7.4)
NEUTROPHILS NFR BLD AUTO: 88.4 % — HIGH (ref 43–77)
NRBC # BLD: 0 /100 WBCS — SIGNIFICANT CHANGE UP (ref 0–0)
ORGANISM # SPEC MICROSCOPIC CNT: SIGNIFICANT CHANGE UP
PHOSPHATE SERPL-MCNC: 4.3 MG/DL — SIGNIFICANT CHANGE UP (ref 2.5–4.5)
PLATELET # BLD AUTO: 203 K/UL — SIGNIFICANT CHANGE UP (ref 150–400)
POTASSIUM SERPL-MCNC: 3.4 MMOL/L — LOW (ref 3.5–5.3)
POTASSIUM SERPL-SCNC: 3.4 MMOL/L — LOW (ref 3.5–5.3)
PROT SERPL-MCNC: 8.6 G/DL — HIGH (ref 6–8.3)
RBC # BLD: 4.36 M/UL — SIGNIFICANT CHANGE UP (ref 4.2–5.8)
RBC # FLD: 14.9 % — HIGH (ref 10.3–14.5)
SODIUM SERPL-SCNC: 137 MMOL/L — SIGNIFICANT CHANGE UP (ref 135–145)
SPECIMEN SOURCE: SIGNIFICANT CHANGE UP
WBC # BLD: 11.13 K/UL — HIGH (ref 3.8–10.5)
WBC # FLD AUTO: 11.13 K/UL — HIGH (ref 3.8–10.5)

## 2019-08-25 PROCEDURE — 93010 ELECTROCARDIOGRAM REPORT: CPT

## 2019-08-25 RX ORDER — INSULIN LISPRO 100/ML
3 VIAL (ML) SUBCUTANEOUS ONCE
Refills: 0 | Status: COMPLETED | OUTPATIENT
Start: 2019-08-25 | End: 2019-08-25

## 2019-08-25 RX ORDER — METOPROLOL TARTRATE 50 MG
5 TABLET ORAL ONCE
Refills: 0 | Status: COMPLETED | OUTPATIENT
Start: 2019-08-25 | End: 2019-08-25

## 2019-08-25 RX ORDER — ONDANSETRON 8 MG/1
4 TABLET, FILM COATED ORAL EVERY 8 HOURS
Refills: 0 | Status: COMPLETED | OUTPATIENT
Start: 2019-08-25 | End: 2019-08-28

## 2019-08-25 RX ORDER — METOCLOPRAMIDE HCL 10 MG
10 TABLET ORAL ONCE
Refills: 0 | Status: DISCONTINUED | OUTPATIENT
Start: 2019-08-25 | End: 2019-08-25

## 2019-08-25 RX ORDER — METOPROLOL TARTRATE 50 MG
5 TABLET ORAL ONCE
Refills: 0 | Status: COMPLETED | OUTPATIENT
Start: 2019-08-25 | End: 2019-08-26

## 2019-08-25 RX ORDER — LABETALOL HCL 100 MG
10 TABLET ORAL ONCE
Refills: 0 | Status: COMPLETED | OUTPATIENT
Start: 2019-08-25 | End: 2019-08-25

## 2019-08-25 RX ADMIN — Medication 5 MILLIGRAM(S): at 22:55

## 2019-08-25 RX ADMIN — Medication 5 MILLIGRAM(S): at 06:44

## 2019-08-25 RX ADMIN — Medication 3: at 08:32

## 2019-08-25 RX ADMIN — Medication 2: at 12:18

## 2019-08-25 RX ADMIN — Medication 3 UNIT(S): at 22:53

## 2019-08-25 RX ADMIN — Medication 10 MILLIGRAM(S): at 23:21

## 2019-08-25 RX ADMIN — ONDANSETRON 4 MILLIGRAM(S): 8 TABLET, FILM COATED ORAL at 08:37

## 2019-08-25 RX ADMIN — Medication 5 MILLIGRAM(S): at 19:22

## 2019-08-25 RX ADMIN — ONDANSETRON 4 MILLIGRAM(S): 8 TABLET, FILM COATED ORAL at 18:52

## 2019-08-25 RX ADMIN — PANTOPRAZOLE SODIUM 10 MG/HR: 20 TABLET, DELAYED RELEASE ORAL at 06:44

## 2019-08-25 RX ADMIN — MEROPENEM 100 MILLIGRAM(S): 1 INJECTION INTRAVENOUS at 18:52

## 2019-08-25 NOTE — CONSULT NOTE ADULT - SUBJECTIVE AND OBJECTIVE BOX
[  ] STAT REQUEST              [ X ] ROUTINE REQUEST    Patient is a 58 year old male with coffee ground emesis.        HPI:  57 y/o male from [facility], with PMHx of DM (on insulin), partial vision loss, ESRD (on TTS HD via LUE AVF), s/p left BKA and adrenal insufficiency is sent to the ED for fever during his dialysis session today. Patient reports he completed 4 hours of dialysis PTA. He reports his fever started today and is associated with chills. He denies any cough, shortness of breath, chest pain, abdominal pain, nausea, vomiting, diarrhea, dysuria or increased urinary frequency.         PAIN MANAGEMENT:  Pain Scale:                 /10  Pain Location:      Prior Colonoscopy:    PAST MEDICAL HISTORY  Vision loss of left eye  Osteoporosis  Osteoarthritis  Contracture of hand  Diabetic neuropathy  Diabetes mellitus  Hyperparathyroidism  Spinal stenosis of lumbosacral region  Peripheral vascular disease  HLD (hyperlipidemia)  Coronary artery disease  Glaucoma  Anemia  ESRD on HD   Adrenal insufficiency  Hypertension         PAST SURGICAL HISTORY  Left cataract extraction  Right cataract extraction  Below knee amputation status, left  No significant past surgical history      Allergies    fish (Rash)  liver (Anaphylaxis)  No Known Drug Allergies          MEDICATIONS  (STANDING):  chlorhexidine 2% Cloths 1 Application(s) Topical daily  cyanocobalamin 1000 MICROGram(s) Oral daily  dextrose 5% + lactated ringers. 1000 milliLiter(s) (100 mL/Hr) IV Continuous <Continuous>  diphenhydrAMINE 25 milliGRAM(s) Oral at bedtime  epoetin beverley Injectable 4000 Unit(s) IV Push <User Schedule>  fludroCORTISONE 0.1 milliGRAM(s) Oral two times a day  folic acid 1 milliGRAM(s) Oral daily  gabapentin 200 milliGRAM(s) Oral daily  insulin lispro (HumaLOG) corrective regimen sliding scale   SubCutaneous three times a day before meals  meropenem  IVPB 1000 milliGRAM(s) IV Intermittent every 24 hours  metoclopramide Injectable 5 milliGRAM(s) IV Push every 8 hours  mupirocin 2% Nasal 1 Application(s) Nasal every 12 hours  pantoprazole Infusion 8 mG/Hr (10 mL/Hr) IV Continuous <Continuous>  simvastatin 40 milliGRAM(s) Oral at bedtime  sucralfate suspension 1 Gram(s) Oral four times a day    MEDICATIONS  (PRN):  acetaminophen   Tablet .. 650 milliGRAM(s) Oral every 6 hours PRN Temp greater or equal to 38C (100.4F), Mild Pain (1 - 3)  aluminum hydroxide/magnesium hydroxide/simethicone Suspension 30 milliLiter(s) Oral every 4 hours PRN Dyspepsia      SOCIAL HISTORY  Advanced Directives:       [  ] Full Code       [  ] DNR  Marital Status:         [  ] M      [  ] S      [  ] D       [  ] W  Children:       [  ] Yes      [  ] No  Occupation:        [  ] Employed       [  ] Unemployed       [  ] Retired  Diet:       [  ] Regular       [  ] PEG feeding          [  ] NG tube feeding  Drug Use:           [  ] Patient denied          [  ] Yes  Alcohol:           [  ] No             [  ] Yes (socially)         [  ] Yes (chronic)  Tobacco:           [  ] Yes           [  ] No    FAMILY HISTORY  [  ] Heart Disease            [  ] Diabetes             [  ] HTN             [  ] Colon Cancer             [  ] Stomach Cancer              [  ] Pancreatic Cancer    VITAL SIGNS   Vital Signs Last 24 Hrs  T(C): 36.8 (25 Aug 2019 08:40), Max: 40 (24 Aug 2019 09:15)  T(F): 98.3 (25 Aug 2019 08:40), Max: 104 (24 Aug 2019 09:15)  HR: 105 (25 Aug 2019 06:40) (104 - 120)  BP: 150/77 (25 Aug 2019 06:40) (108/65 - 173/74)   RR: 17 (25 Aug 2019 05:20) (17 - 18)  SpO2: 100% (25 Aug 2019 05:20) (92% - 100%)    Daily Weight in k.8 (24 Aug 2019 09:15)       CBC Full  -  ( 24 Aug 2019 19:11 )  WBC Count : 13.17 K/uL  RBC Count : 4.19 M/uL  Hemoglobin : 12.8 g/dL  Hematocrit : 39.4 %  Platelet Count - Automated : 203 K/uL  Mean Cell Volume : 94.0 fl  Mean Cell Hemoglobin : 30.5 pg  Mean Cell Hemoglobin Concentration : 32.5 gm/dL  Auto Neutrophil # : x  Auto Lymphocyte # : x  Auto Monocyte # : x  Auto Eosinophil # : x  Auto Basophil # : x  Auto Neutrophil % : x  Auto Lymphocyte % : x  Auto Monocyte % : x  Auto Eosinophil % : x  Auto Basophil % : x      08-24    138  |  91<L>  |  22<H>  ----------------------------<  139<H>  3.3<L>   |  28  |  5.98<H>    Ca    9.3      24 Aug 2019 22:36    Urinalysis (19 @ 15:52)    pH Urine: 6.5    Blood, Urine: Large    Glucose Qualitative, Urine: Negative    Color: Yellow    Urine Appearance: Slightly Turbid    Bilirubin: Negative    Ketone - Urine: Negative    Specific Gravity: 1.010    Protein, Urine: 100    Urobilinogen: Negative    Nitrite: Negative    Leukocyte Esterase Concentration: Moderate        ECG  Ventricular Rate 116 BPM    Atrial Rate 116 BPM    P-R Interval 164 ms    QRS Duration 98 ms    Q-T Interval 336 ms    QTC Calculation(Bezet) 467 ms    P Axis 90 degrees    R Axis 40 degrees    T Axis 86 degrees    Diagnosis Line Sinus tachycardia  Possible Left atrial enlargement  Incomplete right bundle branch block  Left ventricular hypertrophy with repolarization abnormality  Abnormal ECG         RADIOLOGY/IMAGING       EXAM:  CT ABDOMEN AND PELVIS                            PROCEDURE DATE:  2017          INTERPRETATION:  CLINICAL STATEMENT: left abd pain vomiting cough    TECHNIQUE: CT of the abdomen and pelvis was performed without IV and oral   contrast.    COMPARISON: None.    FINDINGS:  2 mm nodule left lower lobe. Cardiomegaly noted. Small pericardial   effusion.    The evaluation of the abdominal viscera and the bowel is limited without   contrast.    The liver, gallbladder are grossly unremarkable.    The spleen, pancreas, right adrenal gland are grossly unremarkable.   Thickening of the left adrenal gland noted    There is no hydronephrosis or urinary calculus.    There is no dilatation of the bowel.  Large amount of stool noted in the   colon. Evaluation of bowel limited due to lack of oral contrast.   Evaluation of distal stomach limited due to underdistention. Wall   thickening not excluded.     Mild wall thickening distal esophagus noted   with mild dilatation    There is no intraperitoneal free air.  There is no free fluid.    Urinary bladder markedly distended. Prostate gland measures 4.7 cm in   transverse dimension.    Age-indeterminate compression deformity with areas of sclerosis involving   T11 and T12 vertebral bodies. Nonspecific stranding of the fat noted.    IMPRESSION:  Limited exam due to lack of contrast and intraperitoneal fat.    No bowel obstruction. Large amount of stool in the colon. If there is   clinical concern for GI pathology, repeat exam with oral contrast   recommended.    Age-indeterminate compression deformity of T11 and T12 vertebral bodies   with nonspecific stranding of the fat. MRI exam recommended. [  ] STAT REQUEST              [ X ] ROUTINE REQUEST    Patient is a 58 year old male with coffee ground emesis.        HPI:  58 year old male with multiple medical problems including DM, ESRD on HD admitted with sepsis. Patient had a few episodes of coffee ground emesis. Patient denies abdominal pain, hematemesis, hematochezia, melena, fever, chills, chest pain, SOB, cough, palpitation, hematuria, dysuria or diarrhea.            PAIN MANAGEMENT:  Pain Scale:                0 /10  Pain Location:      Prior Colonoscopy:  Unknown    PAST MEDICAL HISTORY  Vision loss of left eye  Osteoporosis  Osteoarthritis  Contracture of hand  Diabetic neuropathy  Diabetes mellitus  Hyperparathyroidism  Spinal stenosis of lumbosacral region  Peripheral vascular disease  HLD (hyperlipidemia)  Coronary artery disease  Glaucoma  Anemia  ESRD on HD   Adrenal insufficiency  Hypertension         PAST SURGICAL HISTORY  Left cataract extraction  Right cataract extraction  Below knee amputation status, left         Allergies    fish (Rash)  liver (Anaphylaxis)  No Known Drug Allergies          MEDICATIONS  (STANDING):  chlorhexidine 2% Cloths 1 Application(s) Topical daily  cyanocobalamin 1000 MICROGram(s) Oral daily  dextrose 5% + lactated ringers. 1000 milliLiter(s) (100 mL/Hr) IV Continuous <Continuous>  diphenhydrAMINE 25 milliGRAM(s) Oral at bedtime  epoetin beverley Injectable 4000 Unit(s) IV Push <User Schedule>  fludroCORTISONE 0.1 milliGRAM(s) Oral two times a day  folic acid 1 milliGRAM(s) Oral daily  gabapentin 200 milliGRAM(s) Oral daily  insulin lispro (HumaLOG) corrective regimen sliding scale   SubCutaneous three times a day before meals  meropenem  IVPB 1000 milliGRAM(s) IV Intermittent every 24 hours  metoclopramide Injectable 5 milliGRAM(s) IV Push every 8 hours  mupirocin 2% Nasal 1 Application(s) Nasal every 12 hours  pantoprazole Infusion 8 mG/Hr (10 mL/Hr) IV Continuous <Continuous>  simvastatin 40 milliGRAM(s) Oral at bedtime  sucralfate suspension 1 Gram(s) Oral four times a day    MEDICATIONS  (PRN):  acetaminophen   Tablet .. 650 milliGRAM(s) Oral every 6 hours PRN Temp greater or equal to 38C (100.4F), Mild Pain (1 - 3)  aluminum hydroxide/magnesium hydroxide/simethicone Suspension 30 milliLiter(s) Oral every 4 hours PRN Dyspepsia      SOCIAL HISTORY  Advanced Directives:       [ X ] Full Code       [  ] DNR  Marital Status:         [  ] M      [ X ] S      [  ] D       [  ] W  Children:       [ X ] Yes      [  ] No  Occupation:        [  ] Employed       [X  ] Unemployed       [  ] Retired  Diet:       [ X ] Regular       [  ] PEG feeding          [  ] NG tube feeding  Drug Use:           [X  ] Patient denied          [  ] Yes  Alcohol:           [ X ] No             [  ] Yes (socially)         [  ] Yes (chronic)  Tobacco:           [  ] Yes           [X] No    FAMILY HISTORY  [X  ] Heart Disease            [  ] Diabetes             [  ] HTN             [  ] Colon Cancer             [  ] Stomach Cancer              [  ] Pancreatic Cancer    VITAL SIGNS   Vital Signs Last 24 Hrs  T(C): 36.8 (25 Aug 2019 08:40), Max: 40 (24 Aug 2019 09:15)  T(F): 98.3 (25 Aug 2019 08:40), Max: 104 (24 Aug 2019 09:15)  HR: 105 (25 Aug 2019 06:40) (104 - 120)  BP: 150/77 (25 Aug 2019 06:40) (108/65 - 173/74)   RR: 17 (25 Aug 2019 05:20) (17 - 18)  SpO2: 100% (25 Aug 2019 05:20) (92% - 100%)    Daily Weight in k.8 (24 Aug 2019 09:15)       CBC Full  -  ( 24 Aug 2019 19:11 )  WBC Count : 13.17 K/uL  RBC Count : 4.19 M/uL  Hemoglobin : 12.8 g/dL  Hematocrit : 39.4 %  Platelet Count - Automated : 203 K/uL  Mean Cell Volume : 94.0 fl  Mean Cell Hemoglobin : 30.5 pg  Mean Cell Hemoglobin Concentration : 32.5 gm/dL  Auto Neutrophil # : x  Auto Lymphocyte # : x  Auto Monocyte # : x  Auto Eosinophil # : x  Auto Basophil # : x  Auto Neutrophil % : x  Auto Lymphocyte % : x  Auto Monocyte % : x  Auto Eosinophil % : x  Auto Basophil % : x      -    138  |  91<L>  |  22<H>  ----------------------------<  139<H>  3.3<L>   |  28  |  5.98<H>    Ca    9.3      24 Aug 2019 22:36    Urinalysis (19 @ 15:52)    pH Urine: 6.5    Blood, Urine: Large    Glucose Qualitative, Urine: Negative    Color: Yellow    Urine Appearance: Slightly Turbid    Bilirubin: Negative    Ketone - Urine: Negative    Specific Gravity: 1.010    Protein, Urine: 100    Urobilinogen: Negative    Nitrite: Negative    Leukocyte Esterase Concentration: Moderate        ECG  Ventricular Rate 116 BPM    Atrial Rate 116 BPM    P-R Interval 164 ms    QRS Duration 98 ms    Q-T Interval 336 ms    QTC Calculation(Bezet) 467 ms    P Axis 90 degrees    R Axis 40 degrees    T Axis 86 degrees    Diagnosis Line Sinus tachycardia  Possible Left atrial enlargement  Incomplete right bundle branch block  Left ventricular hypertrophy with repolarization abnormality  Abnormal ECG         RADIOLOGY/IMAGING       EXAM:  CT ABDOMEN AND PELVIS                            PROCEDURE DATE:  2017          INTERPRETATION:  CLINICAL STATEMENT: left abd pain vomiting cough    TECHNIQUE: CT of the abdomen and pelvis was performed without IV and oral   contrast.    COMPARISON: None.    FINDINGS:  2 mm nodule left lower lobe. Cardiomegaly noted. Small pericardial   effusion.    The evaluation of the abdominal viscera and the bowel is limited without   contrast.    The liver, gallbladder are grossly unremarkable.    The spleen, pancreas, right adrenal gland are grossly unremarkable.   Thickening of the left adrenal gland noted    There is no hydronephrosis or urinary calculus.    There is no dilatation of the bowel.  Large amount of stool noted in the   colon. Evaluation of bowel limited due to lack of oral contrast.   Evaluation of distal stomach limited due to underdistention. Wall   thickening not excluded.     Mild wall thickening distal esophagus noted   with mild dilatation    There is no intraperitoneal free air.  There is no free fluid.    Urinary bladder markedly distended. Prostate gland measures 4.7 cm in   transverse dimension.    Age-indeterminate compression deformity with areas of sclerosis involving   T11 and T12 vertebral bodies. Nonspecific stranding of the fat noted.    IMPRESSION:  Limited exam due to lack of contrast and intraperitoneal fat.    No bowel obstruction. Large amount of stool in the colon. If there is   clinical concern for GI pathology, repeat exam with oral contrast   recommended.    Age-indeterminate compression deformity of T11 and T12 vertebral bodies   with nonspecific stranding of the fat. MRI exam recommended.

## 2019-08-25 NOTE — CHART NOTE - NSCHARTNOTEFT_GEN_A_CORE
INTERVAL HPI : 59 y/o male from [facility], with PMHx of DM (on insulin), partial vision loss, ESRD (on TTS HD via LUE AVF), s/p left BKA and adrenal insufficiency  Patient was admitted with sepsis 2/2 UTI and Klebsiella bacteremia, treated with IV zosyn.  Pt had two  episodes of coffee ground emesis last night and ICU was consulted, however  hgb was stable, pt was recommended to be monitored on medicine floor.     RRT was called around 5.27 pm for unresponsiveness. Pt had a pulse. Vitals /85 ,  , O2 sat 100% on RA .    Pt responded to painful stimuli and become alert and awake though somewhat lethargic   Pt was able to converse later.   Pt has been refusing his meds , blood draws since yesterday as per RN       REVIEW OF SYSTEMS:  RESPIRATORY: No cough; No shortness of breath  CARDIOVASCULAR: No chest pain, no palpitations  GASTROINTESTINAL: No pain. + nausea +  vomiting; No diarrhea   NEUROLOGICAL: No headache or numbness, no tremors    Vital Signs Last 24 Hrs  T(C): 37.2 (25 Aug 2019 15:47), Max: 37.4 (24 Aug 2019 21:57)  T(F): 99 (25 Aug 2019 15:47), Max: 99.4 (24 Aug 2019 21:57)  HR: 114 (25 Aug 2019 15:47) (105 - 120)  BP: 165/82 (25 Aug 2019 15:47) (130/58 - 187/91)  BP(mean): --  RR: 17 (25 Aug 2019 15:47) (16 - 18)  SpO2: 100% (25 Aug 2019 15:47) (99% - 100%)      PHYSICAL EXAM:  GENERAL: NAD, appears agitated and restless   CHEST/LUNG: Clear to auscultation   ABDOMEN: Soft, Nontender,  non distended . BS +ve   SKIN: warm and dry; no rash  NERVOUS SYSTEM:  Awake and alert; though lethargic ,  no new deficits    A/P :     1 . UNCONTROLLED HTN :   - pt has been refusing his meds .   - medication compliance reinforced . Called the INTERVAL HPI : 57 y/o male from [facility], with PMHx of DM (on insulin), partial vision loss, ESRD (on TTS HD via LUE AVF), s/p left BKA and adrenal insufficiency  Patient was admitted with sepsis 2/2 UTI and Klebsiella bacteremia, treated with IV zosyn.  Pt had two  episodes of coffee ground emesis last night and ICU was consulted, however  hgb was stable, pt was recommended to be monitored on medicine floor.     RRT was called around 5.27 pm for unresponsiveness. Pt had a pulse. Vitals /85 ,  , O2 sat 100% on RA .    Pt responded to painful stimuli and become alert and awake though somewhat lethargic   Pt was able to converse later.   Pt has been refusing his meds , blood draws since yesterday as per RN       REVIEW OF SYSTEMS:  RESPIRATORY: No cough; No shortness of breath  CARDIOVASCULAR: No chest pain, no palpitations  GASTROINTESTINAL: No pain. + nausea +  vomiting; No diarrhea   NEUROLOGICAL: No headache or numbness, no tremors    Vital Signs Last 24 Hrs  T(C): 37.2 (25 Aug 2019 15:47), Max: 37.4 (24 Aug 2019 21:57)  T(F): 99 (25 Aug 2019 15:47), Max: 99.4 (24 Aug 2019 21:57)  HR: 114 (25 Aug 2019 15:47) (105 - 120)  BP: 165/82 (25 Aug 2019 15:47) (130/58 - 187/91)  BP(mean): --  RR: 17 (25 Aug 2019 15:47) (16 - 18)  SpO2: 100% (25 Aug 2019 15:47) (99% - 100%)      PHYSICAL EXAM:  GENERAL: NAD, appears agitated and restless   CHEST/LUNG: Clear to auscultation   ABDOMEN: Soft, Nontender,  non distended . BS +ve   SKIN: warm and dry; no rash  NERVOUS SYSTEM:  Awake and alert; though lethargic ,  no new deficits    A/P :     1 . UNCONTROLLED HTN :   - pt has been refusing his meds .  - given lopressor 5 mg IV push x 1    - medication compliance reinforced . Tried calling emergency contact ARTEMIO at [phone] numerous times but no response   - Monitor BP   - ECG Unchanged from prev one   - Labs unchanged     2. NAUSEA/VOMITING   - pt refusing zofran and reglan   - needs medication reinforcement   - IV PPI , sucralfate   GI f/u

## 2019-08-25 NOTE — CONSULT NOTE ADULT - ASSESSMENT
The etiology for vomiting can be due to:  1. Uremia  2. Gastroparesis  3. Peptic ulcer disease  4. Fecal impaction    The etiology for coffee ground emesis can be due to:  1. Erosive esophagitis  2. Peptic ulcer disease  3. Ethel Renee tear    --No evidence of acute GI bleeding    Suggestions:    1. Dialysis as per Renal  2. Protonix daily  3. NPO  4. IVF hydration   5. Monitor H/H  6. Stool softener / Laxative  7. DVT prophylaxis  8. DVT prophylaxis

## 2019-08-25 NOTE — PHYSICAL THERAPY INITIAL EVALUATION ADULT - ADDITIONAL COMMENTS
Patient is a resident and [facility].  Reports he ambulated independently with Left prosthesis and Rollator.  Was independent with ADLs.

## 2019-08-25 NOTE — CHART NOTE - NSCHARTNOTEFT_GEN_A_CORE
59 y/o male from [facility], with PMHx of DM (on insulin), partial vision loss, ESRD (on TTS HD via LUE AVF), s/p left BKA and adrenal insufficiency  Patient was admitted with sepsis 2/2 UTI and Klebsiella bacteremia, treated with IV zosyn.  Pt had two  episodes of coffee ground emesis last night and ICU was consulted, however  hgb was stable, pt was recommended to be monitored on medicine floor.             Notified by RN, pt co of nausea and scant vomiting (not coffee ground)  Pt requested to be disconnected from IV fluids, refused antiemetic, antibiotic and blood work   Appeared restless   Rapid respond was called by RN               REVIEW OF SYSTEMS:  RESPIRATORY: No cough; No shortness of breath  CARDIOVASCULAR: No chest pain, no palpitations  GASTROINTESTINAL: No pain. + nausea +  vomiting; No diarrhea   NEUROLOGICAL: No headache or numbness, no tremors        Vital Signs Last 24 Hrs  T(C): 37.2 (25 Aug 2019 15:47), Max: 37.4 (24 Aug 2019 21:57)  T(F): 99 (25 Aug 2019 15:47), Max: 99.4 (24 Aug 2019 21:57)  HR: 114 (25 Aug 2019 15:47) (105 - 120)  BP: 165/82 (25 Aug 2019 15:47) (130/58 - 187/91)  BP(mean): --  RR: 17 (25 Aug 2019 15:47) (16 - 18)  SpO2: 100% (25 Aug 2019 15:47) (99% - 100%)      PHYSICAL EXAM:  GENERAL: NAD, appears agitated and restless   CHEST/LUNG: Clear to ausculitation   ABDOMEN: Soft, Nontender, +scant vomiting x 2 today, light brownish in color, not coffee ground   EXTREMITIES:   SKIN: warm and dry; no rash  NERVOUS SYSTEM:  Awake and alert; Oriented  to place, person and time ; no new deficits      A/P 57 y/o male from [facility], with PMHx of DM (on insulin), partial vision loss, ESRD (on TTS HD via LUE AVF), s/p left BKA and adrenal insufficiency  Patient was admitted with sepsis 2/2 UTI and Klebsiella bacteremia, treated with IV zosyn.  Pt had two  episodes of coffee ground emesis last night and ICU was consulted, however  hgb was stable, pt was recommended to be monitored on medicine floor.             Notified by RN, pt co of nausea and scant vomiting (not coffee ground)  Pt requested to be disconnected from IV fluids, refused antiemetic, antibiotic and blood work   Appeared restless   Rapid respond was called by RN       REVIEW OF SYSTEMS:  RESPIRATORY: No cough; No shortness of breath  CARDIOVASCULAR: No chest pain, no palpitations  GASTROINTESTINAL: No pain. + nausea +  vomiting; No diarrhea   NEUROLOGICAL: No headache or numbness, no tremors        Vital Signs Last 24 Hrs  T(C): 37.2 (25 Aug 2019 15:47), Max: 37.4 (24 Aug 2019 21:57)  T(F): 99 (25 Aug 2019 15:47), Max: 99.4 (24 Aug 2019 21:57)  HR: 114 (25 Aug 2019 15:47) (105 - 120)  BP: 165/82 (25 Aug 2019 15:47) (130/58 - 187/91)  BP(mean): --  RR: 17 (25 Aug 2019 15:47) (16 - 18)  SpO2: 100% (25 Aug 2019 15:47) (99% - 100%)      PHYSICAL EXAM:  GENERAL: NAD, appears agitated and restless   CHEST/LUNG: Clear to auscultation   mildly tachycardic  ABDOMEN: Soft, Nontender, +scant vomiting x 2 today, light brownish in color, not coffee ground   EXTREMITIES: left BKA  SKIN: warm and dry; no rash  NERVOUS SYSTEM:  Awake and alert; Oriented  to place, person and time ; no new deficits    A/P 57 y/o male from [facility], with PMHx of DM (on insulin), partial vision loss, ESRD (on TTS HD via LUE AVF), s/p left BKA and adrenal insufficiency  Patient was admitted with sepsis 2/2 UTI and Klebsiella bacteremia, now with nausea and vomiting    -meds and IV fluids offered but refused   -further management as per RRT

## 2019-08-25 NOTE — PROGRESS NOTE ADULT - SUBJECTIVE AND OBJECTIVE BOX
Patient is a 58y old  Male who presents with a chief complaint of sepsis (25 Aug 2019 09:00)    PATIENT IS SEEN AND EXAMINED IN MEDICAL FLOOR.    ALLERGIES:  fish (Rash)  liver (Anaphylaxis)  No Known Drug Allergies    VITALS:    Vital Signs Last 24 Hrs  T(C): 37.4 (25 Aug 2019 19:58), Max: 37.4 (24 Aug 2019 21:57)  T(F): 99.3 (25 Aug 2019 19:58), Max: 99.4 (24 Aug 2019 21:57)  HR: 105 (25 Aug 2019 19:58) (105 - 120)  BP: 179/89 (25 Aug 2019 19:58) (130/58 - 190/87)  BP(mean): --  RR: 16 (25 Aug 2019 19:58) (16 - 18)  SpO2: 97% (25 Aug 2019 19:58) (97% - 100%)    LABS:    CBC Full  -  ( 25 Aug 2019 18:47 )  WBC Count : 11.13 K/uL  RBC Count : 4.36 M/uL  Hemoglobin : 13.3 g/dL  Hematocrit : 40.6 %  Platelet Count - Automated : 203 K/uL  Mean Cell Volume : 93.1 fl  Mean Cell Hemoglobin : 30.5 pg  Mean Cell Hemoglobin Concentration : 32.8 gm/dL  Auto Neutrophil # : 9.84 K/uL  Auto Lymphocyte # : 0.49 K/uL  Auto Monocyte # : 0.69 K/uL  Auto Eosinophil # : 0.00 K/uL  Auto Basophil # : 0.03 K/uL  Auto Neutrophil % : 88.4 %  Auto Lymphocyte % : 4.4 %  Auto Monocyte % : 6.2 %  Auto Eosinophil % : 0.0 %  Auto Basophil % : 0.3 %      08-25    137  |  95<L>  |  40<H>  ----------------------------<  203<H>  3.4<L>   |  24  |  8.68<H>    Ca    9.0      25 Aug 2019 18:47  Phos  4.3     08-25  Mg     2.7     08-25    TPro  8.6<H>  /  Alb  3.4<L>  /  TBili  0.6  /  DBili  x   /  AST  17  /  ALT  16  /  AlkPhos  110  08-25    CAPILLARY BLOOD GLUCOSE    POCT Blood Glucose.: 203 mg/dL (25 Aug 2019 18:24)  POCT Blood Glucose.: 178 mg/dL (25 Aug 2019 16:57)  POCT Blood Glucose.: 250 mg/dL (25 Aug 2019 11:29)  POCT Blood Glucose.: 260 mg/dL (25 Aug 2019 08:03)  POCT Blood Glucose.: 147 mg/dL (25 Aug 2019 00:12)      LIVER FUNCTIONS - ( 25 Aug 2019 18:47 )  Alb: 3.4 g/dL / Pro: 8.6 g/dL / ALK PHOS: 110 U/L / ALT: 16 U/L DA / AST: 17 U/L / GGT: x           Creatinine Trend: 8.68<--, 5.98<--, 8.14<--, 3.62<--  I&O's Summary      .Urine  08-24 @ 02:13   50,000 - 99,000 CFU/mL Gram Negative Rods  --  --      .Blood  08-23 @ 01:18   Growth in aerobic bottle: Klebsiella pneumoniae  "Due to technical problems, Proteus sp. will Not be reported as part of  the BCID panel until further notice"  ***Blood Panel PCR results on this specimen are available  approximately 3 hours after the Gram stain result.***  Gram stain, PCR, and/or culture results may not always  correspond due to difference in methodologies.  ************************************************************  This PCR assay was performed using [employer].  The following targets are tested for: Enterococcus,  vancomycin resistant enterococci, Listeria monocytogenes,  coagulase negative staphylococci, S. aureus,  methicillin resistant S. aureus, Streptococcus agalactiae  (Group B), S. pneumoniae, S. pyogenes (Group A),  Acinetobacter baumannii, Enterobacter cloacae, E. coli,  Klebsiella oxytoca, K. pneumoniae, Proteus sp.,  Serratia marcescens, Haemophilus influenzae,  Neisseria meningitidis, Pseudomonas aeruginosa, Candida  albicans, C. glabrata, C krusei, C parapsilosis,  C. tropicalis and the KPC resistance gene.  --  Blood Culture PCR  Klebsiella pneumoniae          MEDICATIONS:    MEDICATIONS  (STANDING):  chlorhexidine 2% Cloths 1 Application(s) Topical daily  cyanocobalamin 1000 MICROGram(s) Oral daily  dextrose 5% + lactated ringers. 1000 milliLiter(s) (100 mL/Hr) IV Continuous <Continuous>  diphenhydrAMINE 25 milliGRAM(s) Oral at bedtime  epoetin beverley Injectable 4000 Unit(s) IV Push <User Schedule>  fludroCORTISONE 0.1 milliGRAM(s) Oral two times a day  folic acid 1 milliGRAM(s) Oral daily  gabapentin 200 milliGRAM(s) Oral daily  insulin lispro (HumaLOG) corrective regimen sliding scale   SubCutaneous three times a day before meals  meropenem  IVPB 1000 milliGRAM(s) IV Intermittent every 24 hours  metoclopramide Injectable 5 milliGRAM(s) IV Push every 8 hours  metoprolol tartrate Injectable 5 milliGRAM(s) IV Push once  mupirocin 2% Nasal 1 Application(s) Nasal every 12 hours  pantoprazole Infusion 8 mG/Hr (10 mL/Hr) IV Continuous <Continuous>  simvastatin 40 milliGRAM(s) Oral at bedtime  sucralfate suspension 1 Gram(s) Oral four times a day      MEDICATIONS  (PRN):  acetaminophen   Tablet .. 650 milliGRAM(s) Oral every 6 hours PRN Temp greater or equal to 38C (100.4F), Mild Pain (1 - 3)  aluminum hydroxide/magnesium hydroxide/simethicone Suspension 30 milliLiter(s) Oral every 4 hours PRN Dyspepsia  ondansetron Injectable 4 milliGRAM(s) IV Push every 8 hours PRN Nausea and/or Vomiting      REVIEW OF SYSTEMS:                           ALL ROS DONE [ X   ]    CONSTITUTIONAL:  LETHARGIC [   ], FEVER [   ], UNRESPONSIVE [   ]  CVS:  CP  [   ], SOB, [   ], PALPITATIONS [   ], DIZZYNESS [   ]  RS: COUGH [   ], SPUTUM [   ]  GI: ABDOMINAL PAIN [   ], NAUSEA [   ], VOMITINGS [   ], DIARRHEA [   ], CONSTIPATION [   ]  :  DYSURIA [   ], NOCTURIA [   ], INCREASED FREQUENCY [   ], DRIBLING [   ],  SKELETAL: PAINFUL JOINTS [   ], SWOLLEN JOINTS [   ], NECK ACHE [   ], LOW BACK ACHE [   ],  SKIN : ULCERS [   ], RASH [   ], ITCHING [   ]  CNS: HEAD ACHE [   ], DOUBLE VISION [   ], BLURRED VISION [   ], AMS / CONFUSION [   ], SEIZURES [   ], WEAKNESS [   ],TINGLING / NUMBNESS [   ]    PHYSICAL EXAMINATION:  GENERAL APPEARANCE: NO DISTRESS  HEENT:  NO PALLOR, NO  JVD,  NO   NODES, NECK SUPPLE  CVS: S1 +, S2 +,   RS: AEEB,  OCCASIONAL  RALES +,   NO RONCHI  ABD: SOFT, NT, NO, BS +  EXT: NO PE                                                       RUE AVF +, bruit +  SKIN: WARM,      SKELETAL:  ROM ACCEPTABLE  CNS:  AAO X 2-3   , NO  DEFICITS    RADIOLOGY :    < from: Xray Chest 1 View-PORTABLE IMMEDIATE (08.22.19 @ 18:27) >    IMPRESSION: Normal AP chest.    < end of copied text >      ASSESSMENT :     Fever  Vision loss of left eye  Osteoporosis  Osteoarthritis  Contracture of hand  Diabetic neuropathy  Diabetes mellitus  Hyperparathyroidism  Spinal stenosis of lumbosacral region  Peripheral vascular disease  HLD (hyperlipidemia)  Coronary artery disease  Glaucoma  Anemia  CKD (chronic kidney disease)  Adrenal insufficiency  Hypertension  History of left cataract extraction  History of right cataract extraction  Below knee amputation status, left      PLAN:  HPI:  57 y/o male from [facility], with PMHx of DM (on insulin), partial vision loss, ESRD (on TTS HD via LUE AVF), s/p left BKA and adrenal insufficiency is sent to the ED for fever during his dialysis session today. Patient reports he completed 4 hours of dialysis PTA. He reports his fever started today and is associated with chills. He denies any cough, shortness of breath, chest pain, abdominal pain, nausea, vomiting, diarrhea, dysuria or increased urinary frequency.    ED course: s/p vanc and zosyn x 1. s/p 1L LR bolus x2.    GOC as per [facility] records: Full code. (22 Aug 2019 22:20)    - PATIENT IS NON COMPLIANT WITH IVF AND MEDS. PATIENT IS COUNSELLED AT LENGTH. PATIENT IS LETHARGIC, S/P RAPID RESPONSE.     - VOMITING AND NAUSEA - CLEAR LIQUID DIET, CBC, GI CONSULT, ADDED IV REGLAN, ZOFRAN AND PROTONIX. DCD ASA, SQ HEPARIN  - UTI AND BACTEREMIA ( KLEBSIELLA )  ON IV ZOSYN. DCD VANCOMYCIN. . AWAITING ON FINAL  URINE CXS.  KLEBSIELLA IN BLOOD, ONE BOTTLE.  RPT BLOOD CXS  - ESRD ON HD  - GI AND DVT PROPHYLAXIS   -

## 2019-08-25 NOTE — CHART NOTE - NSCHARTNOTEFT_GEN_A_CORE
Rapid Response PGY 3 Note    Rapid response team called because of lethargy.    Allergies  fish (Rash)  liver (Anaphylaxis)  No Known Drug Allergies      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  Diabetic neuropathy  Diabetes mellitus  Hyperparathyroidism  Spinal stenosis of lumbosacral region  Peripheral vascular disease  HLD (hyperlipidemia)  Coronary artery disease  Glaucoma  Anemia  CKD (chronic kidney disease)  Adrenal insufficiency  Hypertension  History of left cataract extraction  History of right cataract extraction  Below knee amputation status, left      Vital Signs Last 24 Hrs  T(C): 37.4 (25 Aug 2019 19:58), Max: 37.4 (25 Aug 2019 19:58)  T(F): 99.3 (25 Aug 2019 19:58), Max: 99.3 (25 Aug 2019 19:58)  HR: 105 (25 Aug 2019 19:58) (105 - 120)  BP: 179/89 (25 Aug 2019 19:58) (150/77 - 190/87)  BP(mean): --  RR: 16 (25 Aug 2019 19:58) (16 - 17)  SpO2: 97% (25 Aug 2019 19:58) (97% - 100%)      Physical Exam:  .  GENERAL: Well developed, Elderly  male, weak  HEENT:  Normocephalic/Atraumatic, reactive light reflex, dry mucous membranes  NECK: Supple, no JVD  RESP: Symmetric movement of the chest, clear to auscultation bilaterally  CVS: + irregular rate and rhythm, S1 and S2 audible, no murmur, rubs or gallops noted  GI: Normal active bowel sounds present, abdomen soft, non tender, non distended  EXTREMITIES:  No edema, no clubbing, cyanosis   MSK: 5/5 strength bilateral upper and lower extremities, Lt leg BKA  PSYCH: Normal mood, annoying   NEURO: Alert and oriented x 3                              13.3   11.13 )-----------( 203      ( 25 Aug 2019 18:47 )             40.6     08-25    137  |  95<L>  |  40<H>  ----------------------------<  203<H>  3.4<L>   |  24  |  8.68<H>    Ca    9.0      25 Aug 2019 18:47  Phos  4.3     08-25  Mg     2.7     08-25    TPro  8.6<H>  /  Alb  3.4<L>  /  TBili  0.6  /  DBili  x   /  AST  17  /  ALT  16  /  AlkPhos  110  08-25         LIVER FUNCTIONS - ( 25 Aug 2019 18:47 )  Alb: 3.4 g/dL / Pro: 8.6 g/dL / ALK PHOS: 110 U/L / ALT: 16 U/L DA / AST: 17 U/L / GGT: x                  MEDICATIONS  (STANDING):  chlorhexidine 2% Cloths 1 Application(s) Topical daily  cyanocobalamin 1000 MICROGram(s) Oral daily  dextrose 5% + lactated ringers. 1000 milliLiter(s) (100 mL/Hr) IV Continuous <Continuous>  diphenhydrAMINE 25 milliGRAM(s) Oral at bedtime  epoetin beverley Injectable 4000 Unit(s) IV Push <User Schedule>  fludroCORTISONE 0.1 milliGRAM(s) Oral two times a day  folic acid 1 milliGRAM(s) Oral daily  gabapentin 200 milliGRAM(s) Oral daily  insulin lispro (HumaLOG) corrective regimen sliding scale   SubCutaneous three times a day before meals  insulin lispro Injectable (HumaLOG). 3 Unit(s) SubCutaneous once  labetalol Injectable 10 milliGRAM(s) IV Push once  meropenem  IVPB 1000 milliGRAM(s) IV Intermittent every 24 hours  metoclopramide Injectable 5 milliGRAM(s) IV Push every 8 hours  metoprolol tartrate Injectable 5 milliGRAM(s) IV Push once  mupirocin 2% Nasal 1 Application(s) Nasal every 12 hours  pantoprazole Infusion 8 mG/Hr (10 mL/Hr) IV Continuous <Continuous>  simvastatin 40 milliGRAM(s) Oral at bedtime  sucralfate suspension 1 Gram(s) Oral four times a day    MEDICATIONS  (PRN):  acetaminophen   Tablet .. 650 milliGRAM(s) Oral every 6 hours PRN Temp greater or equal to 38C (100.4F), Mild Pain (1 - 3)  aluminum hydroxide/magnesium hydroxide/simethicone Suspension 30 milliLiter(s) Oral every 4 hours PRN Dyspepsia  ondansetron Injectable 4 milliGRAM(s) IV Push every 8 hours PRN Nausea and/or Vomiting      Assessment- Rapid Response called for 59 y/o male from [facility], with PMHx of DM (on insulin), partial vision loss, ESRD (on TTS HD via LUE AVF), s/p left BKA and adrenal insufficiency. he is being treated for Klebsiella bacteremia. Pt was lethargic and weak afterwards for which RRT was called. On arrival weakness resolved and patient was cursing everyone for painful stimuli. Still vomiting but refusing IV meds and medical care.     Plan- Hypertensive Encephalopathy   Non-compliant with medications and vomiting. No focal weakness  No suspicion of intracranial pathology  Need better Bp control if allows with IV medications  Unable to reach family for goals of care as he has been refusing medications   Pt will need psych evaluation and advance directives due to overall poor prognosis.

## 2019-08-25 NOTE — CONSULT NOTE ADULT - NEGATIVE ENMT SYMPTOMS
no gum bleeding/no dry mouth/no dysphagia/no throat pain/no ear pain/no hearing difficulty/no nose bleeds

## 2019-08-26 DIAGNOSIS — R78.81 BACTEREMIA: ICD-10-CM

## 2019-08-26 DIAGNOSIS — E87.6 HYPOKALEMIA: ICD-10-CM

## 2019-08-26 DIAGNOSIS — R11.2 NAUSEA WITH VOMITING, UNSPECIFIED: ICD-10-CM

## 2019-08-26 DIAGNOSIS — K22.9 DISEASE OF ESOPHAGUS, UNSPECIFIED: ICD-10-CM

## 2019-08-26 DIAGNOSIS — N39.0 URINARY TRACT INFECTION, SITE NOT SPECIFIED: ICD-10-CM

## 2019-08-26 LAB
-  AMIKACIN: SIGNIFICANT CHANGE UP
-  AMPICILLIN/SULBACTAM: SIGNIFICANT CHANGE UP
-  AMPICILLIN: SIGNIFICANT CHANGE UP
-  AZTREONAM: SIGNIFICANT CHANGE UP
-  CEFAZOLIN: SIGNIFICANT CHANGE UP
-  CEFEPIME: SIGNIFICANT CHANGE UP
-  CEFOXITIN: SIGNIFICANT CHANGE UP
-  CEFTRIAXONE: SIGNIFICANT CHANGE UP
-  CIPROFLOXACIN: SIGNIFICANT CHANGE UP
-  GENTAMICIN: SIGNIFICANT CHANGE UP
-  IMIPENEM: SIGNIFICANT CHANGE UP
-  LEVOFLOXACIN: SIGNIFICANT CHANGE UP
-  MEROPENEM: SIGNIFICANT CHANGE UP
-  NITROFURANTOIN: SIGNIFICANT CHANGE UP
-  PIPERACILLIN/TAZOBACTAM: SIGNIFICANT CHANGE UP
-  TIGECYCLINE: SIGNIFICANT CHANGE UP
-  TOBRAMYCIN: SIGNIFICANT CHANGE UP
-  TRIMETHOPRIM/SULFAMETHOXAZOLE: SIGNIFICANT CHANGE UP
ANION GAP SERPL CALC-SCNC: 19 MMOL/L — HIGH (ref 5–17)
BUN SERPL-MCNC: 60 MG/DL — HIGH (ref 7–18)
CALCIUM SERPL-MCNC: 9.1 MG/DL — SIGNIFICANT CHANGE UP (ref 8.4–10.5)
CHLORIDE SERPL-SCNC: 95 MMOL/L — LOW (ref 96–108)
CO2 SERPL-SCNC: 24 MMOL/L — SIGNIFICANT CHANGE UP (ref 22–31)
CREAT SERPL-MCNC: 11.2 MG/DL — HIGH (ref 0.5–1.3)
CULTURE RESULTS: SIGNIFICANT CHANGE UP
GLUCOSE BLDC GLUCOMTR-MCNC: 171 MG/DL — HIGH (ref 70–99)
GLUCOSE BLDC GLUCOMTR-MCNC: 186 MG/DL — HIGH (ref 70–99)
GLUCOSE BLDC GLUCOMTR-MCNC: 214 MG/DL — HIGH (ref 70–99)
GLUCOSE BLDC GLUCOMTR-MCNC: 239 MG/DL — HIGH (ref 70–99)
GLUCOSE SERPL-MCNC: 188 MG/DL — HIGH (ref 70–99)
HCT VFR BLD CALC: 38.9 % — LOW (ref 39–50)
HGB BLD-MCNC: 12.7 G/DL — LOW (ref 13–17)
MCHC RBC-ENTMCNC: 30.7 PG — SIGNIFICANT CHANGE UP (ref 27–34)
MCHC RBC-ENTMCNC: 32.6 GM/DL — SIGNIFICANT CHANGE UP (ref 32–36)
MCV RBC AUTO: 94 FL — SIGNIFICANT CHANGE UP (ref 80–100)
METHOD TYPE: SIGNIFICANT CHANGE UP
NRBC # BLD: 0 /100 WBCS — SIGNIFICANT CHANGE UP (ref 0–0)
ORGANISM # SPEC MICROSCOPIC CNT: SIGNIFICANT CHANGE UP
ORGANISM # SPEC MICROSCOPIC CNT: SIGNIFICANT CHANGE UP
PLATELET # BLD AUTO: 224 K/UL — SIGNIFICANT CHANGE UP (ref 150–400)
POTASSIUM SERPL-MCNC: 3.2 MMOL/L — LOW (ref 3.5–5.3)
POTASSIUM SERPL-SCNC: 3.2 MMOL/L — LOW (ref 3.5–5.3)
RBC # BLD: 4.14 M/UL — LOW (ref 4.2–5.8)
RBC # FLD: 15 % — HIGH (ref 10.3–14.5)
SODIUM SERPL-SCNC: 138 MMOL/L — SIGNIFICANT CHANGE UP (ref 135–145)
SPECIMEN SOURCE: SIGNIFICANT CHANGE UP
WBC # BLD: 8.62 K/UL — SIGNIFICANT CHANGE UP (ref 3.8–10.5)
WBC # FLD AUTO: 8.62 K/UL — SIGNIFICANT CHANGE UP (ref 3.8–10.5)

## 2019-08-26 PROCEDURE — 74176 CT ABD & PELVIS W/O CONTRAST: CPT | Mod: 26

## 2019-08-26 RX ORDER — METOCLOPRAMIDE HCL 10 MG
10 TABLET ORAL EVERY 8 HOURS
Refills: 0 | Status: DISCONTINUED | OUTPATIENT
Start: 2019-08-26 | End: 2019-08-30

## 2019-08-26 RX ORDER — ACETAMINOPHEN 500 MG
650 TABLET ORAL EVERY 6 HOURS
Refills: 0 | Status: DISCONTINUED | OUTPATIENT
Start: 2019-08-26 | End: 2019-08-30

## 2019-08-26 RX ORDER — METOCLOPRAMIDE HCL 10 MG
10 TABLET ORAL ONCE
Refills: 0 | Status: COMPLETED | OUTPATIENT
Start: 2019-08-26 | End: 2019-08-26

## 2019-08-26 RX ORDER — POTASSIUM CHLORIDE 20 MEQ
10 PACKET (EA) ORAL ONCE
Refills: 0 | Status: COMPLETED | OUTPATIENT
Start: 2019-08-26 | End: 2019-08-26

## 2019-08-26 RX ADMIN — SIMVASTATIN 40 MILLIGRAM(S): 20 TABLET, FILM COATED ORAL at 21:48

## 2019-08-26 RX ADMIN — Medication 10 MILLIGRAM(S): at 05:59

## 2019-08-26 RX ADMIN — ONDANSETRON 4 MILLIGRAM(S): 8 TABLET, FILM COATED ORAL at 09:08

## 2019-08-26 RX ADMIN — MEROPENEM 100 MILLIGRAM(S): 1 INJECTION INTRAVENOUS at 21:48

## 2019-08-26 RX ADMIN — PANTOPRAZOLE SODIUM 10 MG/HR: 20 TABLET, DELAYED RELEASE ORAL at 09:10

## 2019-08-26 RX ADMIN — Medication 10 MILLIGRAM(S): at 12:49

## 2019-08-26 RX ADMIN — Medication 5 MILLIGRAM(S): at 21:48

## 2019-08-26 RX ADMIN — Medication 25 MILLIGRAM(S): at 21:48

## 2019-08-26 RX ADMIN — PANTOPRAZOLE SODIUM 10 MG/HR: 20 TABLET, DELAYED RELEASE ORAL at 18:23

## 2019-08-26 RX ADMIN — Medication 2: at 08:59

## 2019-08-26 NOTE — PROGRESS NOTE ADULT - ASSESSMENT
Patient is a 58y Male with ESRD on HD TTS at [facility] via AVF. Presented to dialysis feeling fine, afebrile. Into dialysis had rigors and chills with fever of 103 associated with vomiting. blood cultures were drawn ( which are resulting negative as of now). given vanco and gentamicin. initially refused to come to hospital but agreed later. in ED found with fever, leucocytosis and vomiting. blood cultures  are growing gram negative rods. renal consulted for ESRD     # ESRD TTS.  # VOMMITTING FROM DIABETIC GASTROPARESIS. CONT REGLAN.  # FEVER WITH GRAM NEGATIVE BACTREMIA- PER Infectious disease specialist  # ABENIA OF CKD. STABLE. GIVE AYAN WITH HD  # HTN BP CONTROLLED   # PHOS AT GOAL

## 2019-08-26 NOTE — CHART NOTE - NSCHARTNOTEFT_GEN_A_CORE
EVENT: Patient seen at bedside multiple times. Continuing nausea. Patient continue to refuse medications. RR called earlier when RN found patient with minimal response. Elevated BP noted, Labetolol IVP given. BP improved.      OBJECTIVE:  Vital Signs Last 24 Hrs  T(C): 36.8 (26 Aug 2019 00:09), Max: 37.4 (25 Aug 2019 19:58)  T(F): 98.2 (26 Aug 2019 00:09), Max: 99.3 (25 Aug 2019 19:58)  HR: 107 (26 Aug 2019 00:09) (105 - 120)  BP: 110/59 (26 Aug 2019 00:09) (110/59 - 197/87)  BP(mean): --  RR: 17 (26 Aug 2019 00:09) (16 - 17)  SpO2: 98% (26 Aug 2019 00:09) (97% - 100%)    FOCUSED PHYSICAL EXAM:  Neuro: awake, alert, oriented x 3. No neuro deficit  Cardiovascular: Pulses +2 B/L in upper extremities, HR regular, BP stable, No edema.  Respiratory: Respirations regular, unlabored, breath sounds clear B/L.   GI: Abdomen soft, non-tender, positive bowel sounds.  : no bladder distention noted. No complaints at this time.  Skin: Dry, intact, no bruising, no diaphoresis. L BKA.       I&O's      LABS:                        13.3   11.13 )-----------( 203      ( 25 Aug 2019 18:47 )             40.6     08-25    137  |  95<L>  |  40<H>  ----------------------------<  203<H>  3.4<L>   |  24  |  8.68<H>    Ca    9.0      25 Aug 2019 18:47  Phos  4.3     08-25  Mg     2.7     08-25    TPro  8.6<H>  /  Alb  3.4<L>  /  TBili  0.6  /  DBili  x   /  AST  17  /  ALT  16  /  AlkPhos  110  08-25        MICROBIOLOGY:        EKG:   IMGAGING:    ASSESSMENT:  HPI:  57 y/o male from [facility], with PMHx of DM (on insulin), partial vision loss, ESRD (on TTS HD via LUE AVF), s/p left BKA and adrenal insufficiency is sent to the ED for fever during his dialysis session today. Patient reports he completed 4 hours of dialysis PTA. He reports his fever started today and is associated with chills. He denies any cough, shortness of breath, chest pain, abdominal pain, nausea, vomiting, diarrhea, dysuria or increased urinary frequency.    ED course: s/p vanc and zosyn x 1. s/p 1L LR bolus x2.    GOC as per [facility] records: Full code. (22 Aug 2019 22:20)    8/25/19 Patient seen at bedside multiple times. Continuing nausea/ vomiting. Patient continue to refuse medications. RR called earlier when RN found patient with minimal response. Elevated BP noted 190/87, Labetolol IVP given. BP improved 110/59. Patient continue to refuse medications. RN reported what patient frequently requesting to take a shower (3-4 times a day).   Patient's PO intake poor secondary to nausea/vomiting. Patient is refusing IV fluids and other medications.     FOLLOW UP / RESULT:   BP stable, patient is resting. No vomiting at this time.

## 2019-08-26 NOTE — PROGRESS NOTE ADULT - SUBJECTIVE AND OBJECTIVE BOX
[facility] : Progress Note :: [phone], (office [phone]),   Dr Mosher / Dr Washington / Dr Young / Dr Doll / Dr Varsha TURCIOS / Dr Tello / Dr Garcia / Dr Larry qiu  _____________________________________________________________________________________________  c/o vomitting..    fish (Rash)  liver (Anaphylaxis)  No Known Drug Allergies    Hospital Medications:   MEDICATIONS  (STANDING):  chlorhexidine 2% Cloths 1 Application(s) Topical daily  cyanocobalamin 1000 MICROGram(s) Oral daily  dextrose 5% + lactated ringers. 1000 milliLiter(s) (100 mL/Hr) IV Continuous <Continuous>  diphenhydrAMINE 25 milliGRAM(s) Oral at bedtime  epoetin beverley Injectable 4000 Unit(s) IV Push <User Schedule>  fludroCORTISONE 0.1 milliGRAM(s) Oral two times a day  folic acid 1 milliGRAM(s) Oral daily  gabapentin 200 milliGRAM(s) Oral daily  insulin lispro (HumaLOG) corrective regimen sliding scale   SubCutaneous three times a day before meals  meropenem  IVPB 1000 milliGRAM(s) IV Intermittent every 24 hours  mupirocin 2% Nasal 1 Application(s) Nasal every 12 hours  pantoprazole Infusion 8 mG/Hr (10 mL/Hr) IV Continuous <Continuous>  simvastatin 40 milliGRAM(s) Oral at bedtime  sucralfate suspension 1 Gram(s) Oral four times a day        VITALS:  T(F): 99.4 (19 @ 20:39), Max: 99.4 (19 @ 20:39)  HR: 99 (19 @ 20:39)  BP: 183/100 (19 @ 20:39)  RR: 18 (19 @ 20:39)  SpO2: 99% (19 @ 20:39)  Wt(kg): --      PHYSICAL EXAM:  Constitutional: NAD  HEENT: anicteric sclera, oropharynx clear..  Neck: No JVD  Respiratory: CTAB, no wheezes, rales or rhonchi  Cardiovascular: S1, S2, RRR  Gastrointestinal: BS+, soft, NT/ND  Extremities:  No peripheral edema  Neurological: A/O x 3, no focal deficits  Psychiatric: Normal mood, normal affect  : No CVA tenderness. No [place].   Skin: No rashes  Vascular Access: AVF with thrill and bruit.    LABS:      138  |  95<L>  |  60<H>  ----------------------------<  188<H>  3.2<L>   |  24  |  11.20<H>    Ca    9.1      26 Aug 2019 15:38  Phos  4.3       Mg     2.7         TPro  8.6<H>  /  Alb  3.4<L>  /  TBili  0.6  /  DBili      /  AST  17  /  ALT  16  /  AlkPhos  110      Creatinine Trend: 11.20 <--, 8.68 <--, 5.98 <--, 8.14 <--, 3.62 <--                        12.7   8.62  )-----------( 224      ( 26 Aug 2019 15:37 )             38.9     Urine Studies:  Urinalysis Basic - ( 23 Aug 2019 15:52 )    Color: Yellow / Appearance: Slightly Turbid / S.010 / pH:   Gluc:  / Ketone: Negative  / Bili: Negative / Urobili: Negative   Blood:  / Protein: 100 / Nitrite: Negative   Leuk Esterase: Moderate / RBC: 25-50 /HPF / WBC >50 /HPF   Sq Epi:  / Non Sq Epi: Few /HPF / Bacteria: Moderate /HPF        RADIOLOGY & ADDITIONAL STUDIES:

## 2019-08-26 NOTE — PROGRESS NOTE ADULT - SUBJECTIVE AND OBJECTIVE BOX
Patient is a 58y old  Male who presents with a chief complaint of sepsis (25 Aug 2019 21:13)      INTERVAL HPI/OVERNIGHT EVENTS:AxOx3, multiple episodes of nausea and vomiting of coffee ground emesis, afebrile, v/s and h&h stable  T(C): 37.1 (08-26-19 @ 13:54), Max: 37.4 (08-25-19 @ 19:58)  HR: 112 (08-26-19 @ 13:54) (105 - 117)  BP: 167/82 (08-26-19 @ 13:54) (110/59 - 197/87)  RR: 16 (08-26-19 @ 13:54) (16 - 17)  SpO2: 100% (08-26-19 @ 13:54) (97% - 100%)  Wt(kg): --  I&O's Summary      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  Diabetic neuropathy  Diabetes mellitus  Hyperparathyroidism  Spinal stenosis of lumbosacral region  Peripheral vascular disease  HLD (hyperlipidemia)  Coronary artery disease  Glaucoma  Anemia  CKD (chronic kidney disease)  Adrenal insufficiency  Hypertension  History of left cataract extraction  History of right cataract extraction  Below knee amputation status, left      LABS:                        12.7   8.62  )-----------( 224      ( 26 Aug 2019 15:37 )             38.9     08-26    138  |  95<L>  |  60<H>  ----------------------------<  188<H>  3.2<L>   |  24  |  11.20<H>    Ca    9.1      26 Aug 2019 15:38  Phos  4.3     08-25  Mg     2.7     08-25    TPro  8.6<H>  /  Alb  3.4<L>  /  TBili  0.6  /  DBili  x   /  AST  17  /  ALT  16  /  AlkPhos  110  08-25        CAPILLARY BLOOD GLUCOSE      POCT Blood Glucose.: 186 mg/dL (26 Aug 2019 11:56)  POCT Blood Glucose.: 239 mg/dL (26 Aug 2019 08:09)  POCT Blood Glucose.: 253 mg/dL (25 Aug 2019 22:29)  POCT Blood Glucose.: 281 mg/dL (25 Aug 2019 21:05)  POCT Blood Glucose.: 203 mg/dL (25 Aug 2019 18:24)  POCT Blood Glucose.: 178 mg/dL (25 Aug 2019 16:57)            MEDICATIONS  (STANDING):  chlorhexidine 2% Cloths 1 Application(s) Topical daily  cyanocobalamin 1000 MICROGram(s) Oral daily  dextrose 5% + lactated ringers. 1000 milliLiter(s) (100 mL/Hr) IV Continuous <Continuous>  diphenhydrAMINE 25 milliGRAM(s) Oral at bedtime  epoetin beverley Injectable 4000 Unit(s) IV Push <User Schedule>  fludroCORTISONE 0.1 milliGRAM(s) Oral two times a day  folic acid 1 milliGRAM(s) Oral daily  gabapentin 200 milliGRAM(s) Oral daily  insulin lispro (HumaLOG) corrective regimen sliding scale   SubCutaneous three times a day before meals  meropenem  IVPB 1000 milliGRAM(s) IV Intermittent every 24 hours  metoprolol tartrate Injectable 5 milliGRAM(s) IV Push once  mupirocin 2% Nasal 1 Application(s) Nasal every 12 hours  pantoprazole Infusion 8 mG/Hr (10 mL/Hr) IV Continuous <Continuous>  potassium chloride  10 mEq/100 mL IVPB 10 milliEquivalent(s) IV Intermittent once  simvastatin 40 milliGRAM(s) Oral at bedtime  sucralfate suspension 1 Gram(s) Oral four times a day    MEDICATIONS  (PRN):  acetaminophen  Suppository .. 650 milliGRAM(s) Rectal every 6 hours PRN Moderate Pain (4 - 6)  aluminum hydroxide/magnesium hydroxide/simethicone Suspension 30 milliLiter(s) Oral every 4 hours PRN Dyspepsia  metoclopramide Injectable 10 milliGRAM(s) IV Push every 8 hours PRN Nausea and vomiting  ondansetron Injectable 4 milliGRAM(s) IV Push every 8 hours PRN Nausea and/or Vomiting      REVIEW OF SYSTEMS:  CONSTITUTIONAL: No fever, weight loss, or fatigue  EYES: No eye pain, visual disturbances, or discharge  ENMT:  No difficulty hearing, tinnitus, vertigo; No sinus or throat pain  NECK: No pain or stiffness  RESPIRATORY: No cough, wheezing, chills or hemoptysis; No shortness of breath  CARDIOVASCULAR: No chest pain, palpitations, dizziness, or leg swelling  GASTROINTESTINAL: No abdominal or epigastric pain.  nausea, vomiting and  hematemesis; No diarrhea or constipation. No melena or hematochezia.  GENITOURINARY: No dysuria, frequency, hematuria, or incontinence  NEUROLOGICAL: No headaches, memory loss, loss of strength, numbness, or tremors  SKIN: No itching, burning, rashes, or lesions   LYMPH NODES: No enlarged glands  ENDOCRINE: No heat or cold intolerance; No hair loss  MUSCULOSKELETAL: No joint pain or swelling; No muscle, back, or extremity pain  PSYCHIATRIC: No depression, anxiety, mood swings, or difficulty sleeping  HEME/LYMPH: No easy bruising, or bleeding gums  ALLERGY AND IMMUNOLOGIC: No hives or eczema    RADIOLOGY & ADDITIONAL TESTS:  < from: CT Abdomen and Pelvis No Cont (08.26.19 @ 11:50) >  EXAM:  CT ABDOMEN AND PELVIS                            PROCEDURE DATE:  08/26/2019          INTERPRETATION:  CLINICAL INFORMATION: Coffee ground emesis    COMPARISON: 12/11/2017    PROCEDURE:   CT of the Abdomen and Pelvis was performed without intravenous contrast.   Intravenous contrast: None.  Oral contrast: None.  Sagittal and coronal reformats were performed.    FINDINGS:    Multiple images are degraded by motion.    LOWER CHEST: Clear lung bases. Distal esophageal wall thickening.    Please note that evaluation of the abdominal organs and vascular   structures is limited by lack of intravenous contrast.    LIVER: Within normal limits.  BILE DUCTS: Mild dilatation.  GALLBLADDER: Within normal limits.  SPLEEN: Within normal limits.  PANCREAS: Within normal limits.  ADRENALS: Mild thickening.  KIDNEYS/URETERS: Right renal hypodensity, possibly cyst. No   hydronephrosis.    BLADDER: Minimally distended. Wall thickening.  REPRODUCTIVE ORGANS: Prostate is enlarged.    BOWEL: No bowel obstruction. Appendix within normal limits.  PERITONEUM: No ascites.  VESSELS: Atherosclerotic calcifications.  RETROPERITONEUM/LYMPH NODES: No lymphadenopathy.    ABDOMINAL WALL: Within normal limits.  BONES: Mild spinal degenerative changes. T11 and T12 compression   deformities, stable.    IMPRESSION:     Distal esophageal wall thickening, question esophagitis.        < end of copied text >    Imaging Personally Reviewed:  [x ] YES  [ ] NO    Consultant(s) Notes Reviewed:  [x ] YES  [ ] NO    PHYSICAL EXAM:  GENERAL: fatigued, well-developed  HEAD:  Atraumatic, Normocephalic  EYES: conjunctiva and sclera clear  ENMT: No tonsillar erythema, exudates, or enlargement; Moist mucous membranes, Good dentition, No lesions  NECK: Supple, No JVD, Normal thyroid  NERVOUS SYSTEM:  Alert & Oriented X3,   CHEST/LUNG: Clear to percussion bilaterally; No rales, rhonchi, wheezing, or rubs  HEART: Regular rate and rhythm; No murmurs, rubs, or gallops  ABDOMEN: Soft, Nontender, Nondistended; Hypoactive BS  EXTREMITIES:  2+ Peripheral Pulses, No clubbing, cyanosis, or edema, s/p Lt BKA  LYMPH: No lymphadenopathy noted  SKIN: No rashes or lesions    Care Collaborated Discussed with Consultants/Other Providers [x ] YES  [ ] NO

## 2019-08-26 NOTE — PROGRESS NOTE ADULT - ASSESSMENT
Patient is a 59 y/o Male admitted for sepsis secondary to UTI and gm (-) klebsiella/bacteremia. NOw also with intractable nausea and vomiting

## 2019-08-26 NOTE — PROGRESS NOTE ADULT - ASSESSMENT
Patient is a 58y old  Male from [facility], with DM (on insulin), partial vision loss, ESRD (on TTS HD via LUE AVF), s/p left BKA and adrenal insufficiency has sent to the ER for  evaluation of fever and chills, during his dialysis session.  He has no cough,  No shortness of breath, No abdominal pain, nausea, vomiting, diarrhea, or dysuria. On admission, he found to have Fever, tachycardia, Leukocytosis and positive Urine analysis.  The CXR shows Lungs are clear. He has started on Zosyn and Vancomycin , and The ID consult requested to assist with further evaluation  and antibiotic management.     # Sepsis ( Fever + tachycardia + leukocytosis )  # UTI - Klebsiella  # High grade Gram Negative Bacteremia - Klebsiella - Most likely source is     would recommend:    1. Continue  Meropenem since spiking fever on Zosyn  2.  Continue IV protonix    d/w House and Nursing staff and Patient     will follow the patient with you Patient is a 58y old  Male from [facility], with DM (on insulin), partial vision loss, ESRD (on TTS HD via LUE AVF), s/p left BKA and adrenal insufficiency has sent to the ER for  evaluation of fever and chills, during his dialysis session.  He has no cough,  No shortness of breath, No abdominal pain, nausea, vomiting, diarrhea, or dysuria. On admission, he found to have Fever, tachycardia, Leukocytosis and positive Urine analysis.  The CXR shows Lungs are clear. He has started on Zosyn and Vancomycin , and The ID consult requested to assist with further evaluation  and antibiotic management.     # Sepsis ( Fever + tachycardia + leukocytosis )  # UTI - Klebsiella  # High grade Gram Negative Bacteremia - Klebsiella - Most likely source is   # Hematemesis    would recommend:    1. Psychiatry evaluation for capacity sibce refusing All medications despite of feeling very ill, namely persistent N/V, GI bleed and Bacteremia  2. Antiemetic agents  3. Continue  Meropenem and ADD IV Fluconazole since CT abd/pelvis shows Esophagitis   4.  Continue IV protonix    d/w Dr. De La Torre , Nursing staff and Patient     will follow the patient with you

## 2019-08-26 NOTE — CHART NOTE - NSCHARTNOTEFT_GEN_A_CORE
EVENT: patient's IV dislocated, patient is nauseous, vomiting    OBJECTIVE:  Vital Signs Last 24 Hrs  T(C): 36.8 (26 Aug 2019 05:22), Max: 37.4 (25 Aug 2019 19:58)  T(F): 98.3 (26 Aug 2019 05:22), Max: 99.3 (25 Aug 2019 19:58)  HR: 107 (26 Aug 2019 05:22) (105 - 117)  BP: 164/98 (26 Aug 2019 05:22) (110/59 - 197/87)  BP(mean): --  RR: 17 (26 Aug 2019 05:22) (16 - 17)  SpO2: 100% (26 Aug 2019 05:22) (97% - 100%)    FOCUSED PHYSICAL EXAM:  Neuro: awake, alert, oriented x 3. No neuro deficit  Cardiovascular: Pulses +2 B/L in lower and upper extremities, HR regular, BP stable, No edema.  Respiratory: Respirations regular, unlabored, breath sounds clear B/L.   GI: Abdomen soft, non-tender, positive bowel sounds. Nausea, Vomiting  : no bladder distention noted. No complaints at this time.  Skin: Dry, intact, no bruising, no diaphoresis.    I&O's      LABS:                        13.3   11.13 )-----------( 203      ( 25 Aug 2019 18:47 )             40.6     08-25    137  |  95<L>  |  40<H>  ----------------------------<  203<H>  3.4<L>   |  24  |  8.68<H>    Ca    9.0      25 Aug 2019 18:47  Phos  4.3     08-25  Mg     2.7     08-25    TPro  8.6<H>  /  Alb  3.4<L>  /  TBili  0.6  /  DBili  x   /  AST  17  /  ALT  16  /  AlkPhos  110  08-25        MICROBIOLOGY:        EKG:   IMGAGING:    ASSESSMENT:  HPI:  57 y/o male from [facility], with PMHx of DM (on insulin), partial vision loss, ESRD (on TTS HD via LUE AVF), s/p left BKA and adrenal insufficiency is sent to the ED for fever during his dialysis session today. Patient reports he completed 4 hours of dialysis PTA. He reports his fever started today and is associated with chills. He denies any cough, shortness of breath, chest pain, abdominal pain, nausea, vomiting, diarrhea, dysuria or increased urinary frequency.    ED course: s/p vanc and zosyn x 1. s/p 1L LR bolus x2.    GOC as per [facility] records: Full code. (22 Aug 2019 22:20)      PLAN:   1. Reglan IM x1  2. Restart IV    FOLLOW UP / RESULT:

## 2019-08-26 NOTE — PROGRESS NOTE ADULT - SUBJECTIVE AND OBJECTIVE BOX
Patient is seen and examined at the bed side, is afebrile now . The Leukocytosis trended down to normal.        REVIEW OF SYSTEMS: All other review systems are negative         ALLERGIES:  fish (Rash)  liver (Anaphylaxis)  No Known Drug Allergies      Vital Signs Last 24 Hrs  T(C): 37.1 (26 Aug 2019 13:54), Max: 37.4 (25 Aug 2019 19:58)  T(F): 98.8 (26 Aug 2019 13:54), Max: 99.3 (25 Aug 2019 19:58)  HR: 112 (26 Aug 2019 13:54) (105 - 117)  BP: 167/82 (26 Aug 2019 13:54) (110/59 - 197/87)  BP(mean): --  RR: 16 (26 Aug 2019 13:54) (16 - 17)  SpO2: 100% (26 Aug 2019 13:54) (97% - 100%)      PHYSICAL EXAM:  GENERAL: Not in acute distress   CHEST/LUNG:  Air entry bilaterally  HEART: s1 and s2 present  ABDOMEN:  Nontender and  Nondistended  EXTREMITIES: Left BKA  CNS: Awake and Alert        LABS:                        12.7   8.62  )-----------( 224      ( 26 Aug 2019 15:37 )             38.9                           12.8   13.17 )-----------( 203      ( 24 Aug 2019 19:11 )             39.4                           10.6   12.44 )-----------( 102      ( 22 Aug 2019 18:08 )             32.9         08-26    138  |  95<L>  |  60<H>  ----------------------------<  188<H>  3.2<L>   |  24  |  11.20<H>    Ca    9.1      26 Aug 2019 15:38  Phos  4.3       Mg     2.7         TPro  8.6<H>  /  Alb  3.4<L>  /  TBili  0.6  /  DBili  x   /  AST  17  /  ALT  16  /  AlkPhos  110  -    08-24    139  |  95<L>  |  41<H>  ----------------------------<  80  3.4<L>   |  25  |  8.14<H>    Ca    8.6      24 Aug 2019 09:19      08-22    137  |  98  |  13  ----------------------------<  76  4.2   |  31  |  3.62<H>    Ca    8.5      22 Aug 2019 18:08    TPro  7.1  /  Alb  3.0<L>  /  TBili  0.7  /  DBili  x   /  AST  20  /  ALT  13  /  AlkPhos  84  08-22  PT/INR - ( 22 Aug 2019 18:08 )   PT: 13.4 sec;   INR: 1.20 ratio    PTT - ( 22 Aug 2019 18:08 )  PTT:32.5 sec        CAPILLARY BLOOD GLUCOSE  POCT Blood Glucose.: 78 mg/dL (23 Aug 2019 11:30)  POCT Blood Glucose.: 88 mg/dL (23 Aug 2019 07:58)  POCT Blood Glucose.: 125 mg/dL (22 Aug 2019 23:34)        Urinalysis Basic - ( 23 Aug 2019 15:52 )  Color: Yellow / Appearance: Slightly Turbid / S.010 / pH: x  Gluc: x / Ketone: Negative  / Bili: Negative / Urobili: Negative   Blood: x / Protein: 100 / Nitrite: Negative   Leuk Esterase: Moderate / RBC: 25-50 /HPF / WBC >50 /HPF   Sq Epi: x / Non Sq Epi: Few /HPF / Bacteria: Moderate /HPF      MEDICATIONS  (STANDING):  chlorhexidine 2% Cloths 1 Application(s) Topical daily  cyanocobalamin 1000 MICROGram(s) Oral daily  dextrose 5% + lactated ringers. 1000 milliLiter(s) (100 mL/Hr) IV Continuous <Continuous>  diphenhydrAMINE 25 milliGRAM(s) Oral at bedtime  epoetin beverley Injectable 4000 Unit(s) IV Push <User Schedule>  fludroCORTISONE 0.1 milliGRAM(s) Oral two times a day  folic acid 1 milliGRAM(s) Oral daily  gabapentin 200 milliGRAM(s) Oral daily  insulin lispro (HumaLOG) corrective regimen sliding scale   SubCutaneous three times a day before meals  meropenem  IVPB 1000 milliGRAM(s) IV Intermittent every 24 hours  metoprolol tartrate Injectable 5 milliGRAM(s) IV Push once  mupirocin 2% Nasal 1 Application(s) Nasal every 12 hours  pantoprazole Infusion 8 mG/Hr (10 mL/Hr) IV Continuous <Continuous>  simvastatin 40 milliGRAM(s) Oral at bedtime  sucralfate suspension 1 Gram(s) Oral four times a day    MEDICATIONS  (PRN):  acetaminophen  Suppository .. 650 milliGRAM(s) Rectal every 6 hours PRN Moderate Pain (4 - 6)  aluminum hydroxide/magnesium hydroxide/simethicone Suspension 30 milliLiter(s) Oral every 4 hours PRN Dyspepsia  metoclopramide Injectable 10 milliGRAM(s) IV Push every 8 hours PRN Nausea and vomiting  ondansetron Injectable 4 milliGRAM(s) IV Push every 8 hours PRN Nausea and/or Vomiting        RADIOLOGY & ADDITIONAL TESTS:      < from: CT Abdomen and Pelvis No Cont (19 @ 11:50) >    Distal esophageal wall thickening, question esophagitis.    < end of copied text >    19 : Xray Chest 1 View-PORTABLE IMMEDIATE (19 @ 18:27) The cardiac silhouette is within normal limits. The lungs are clear. No pleural abnormality.      MICROBIOLOGY DATA:      Culture - Blood (19 @ 23:06)    Specimen Source: .Blood    Culture Results:   No growth to date.        Culture - Blood (19 @ 23:06)    Specimen Source: .Blood    Culture Results:   No growth to date.      Culture - Urine (19 @ 02:13)    -  Amikacin: S <=16    -  Ampicillin: R >16 These ampicillin results predict results for amoxicillin    -  Ampicillin/Sulbactam: S <=8/4 Enterobacter, Citrobacter, and Serratia may develop resistance during prolonged therapy (3-4 days)    -  Aztreonam: S <=4    -  Cefazolin: S <=8 (MIC_CL_COM_ENTERIC_CEFAZU) For uncomplicated UTI with K. pneumoniae, E. coli, or P. mirablis: TERESA <=16 is sensitive and TERESA >=32 is resistant. This also predicts results for oral agents cefaclor, cefdinir, cefpodoxime, cefprozil, cefuroxime axetil, cephalexin and locarbef for uncomplicated UTI. Note that some isolates may be susceptible to these agents while testing resistant to cefazolin.    -  Cefepime: S <=4    -  Cefoxitin: S <=8    -  Gentamicin: S <=4    -  Imipenem: S <=1    -  Levofloxacin: S <=2    -  Meropenem: S <=1    -  Nitrofurantoin: I 64 Should not be used to treat pyelonephritis    -  Piperacillin/Tazobactam: S <=16    -  Tigecycline: S <=2    -  Tobramycin: S <=4    -  Trimethoprim/Sulfamethoxazole: S <=2/38    -  Ceftriaxone: S <=1 Enterobacter, Citrobacter, and Serratia may develop resistance during prolonged therapy    -  Ciprofloxacin: S <=1    Specimen Source: .Urine    Culture Results:   50,000 - 99,000 CFU/mL Klebsiella pneumoniae    Organism Identification: Klebsiella pneumoniae    Organism: Klebsiella pneumoniae    Method Type: TERESA      Urine Microscopic-Add On (NC) (19 @ 15:52)    Bacteria: Moderate /HPF    Comment - Urine: moderate amorphous sediments present    Epithelial Cells: Few /HPF    Red Blood Cell - Urine: 25-50 /HPF    White Blood Cell - Urine: >50 /HPF      Culture - Blood (19 @ 01:18)    Specimen Source: .Blood    Culture Results:   No growth to date.      Culture - Blood (19 @ 01:18)    -  Trimethoprim/Sulfamethoxazole: S <=2/38    -  Tobramycin: S <=2    -  Piperacillin/Tazobactam: S <=8    -  Meropenem: S <=1    -  Levofloxacin: S <=2    -  Gentamicin: S <=2    -  Imipenem: S <=1    -  Klebsiella pneumoniae: Detec    Gram Stain:   Growth in aerobic bottle: Gram Variable Rods    -  Amikacin: S <=16    -  Ampicillin: R 16 These ampicillin results predict results for amoxicillin    -  Aztreonam: S <=4    -  Ampicillin/Sulbactam: S <=4/2 Enterobacter, Citrobacter, and Serratia may develop resistance during prolonged therapy (3-4 days)    -  Ceftriaxone: S <=1 Enterobacter, Citrobacter, and Serratia may develop resistance during prolonged therapy    -  Cefoxitin: S <=8    -  Cefepime: S <=2    -  Cefazolin: S <=2 Enterobacter, Citrobacter, and Serratia may develop resistance during prolonged therapy (3-4 days)    -  Ertapenem: S <=0.5    -  Ciprofloxacin: S <=1    Specimen Source: .Blood    Organism: Blood Culture PCR    Organism: Klebsiella pneumoniae    Culture Results:   Growth in aerobic bottle: Klebsiella pneumoniae  "Due to technical problems, Proteus sp. will Not be reported as part of  the BCID panel until further notice"  ***Blood Panel PCR results on this specimen are available  approximately 3 hours after the Gram stain result.***  Gram stain, PCR, and/or culture results may not always  correspond due to difference in methodologies.  ************************************************************  This PCR assay was performed using [employer].  The following targets are tested for: Enterococcus,  vancomycin resistant enterococci, Listeria monocytogenes,  coagulase negative staphylococci, S. aureus,  methicillin resistant S. aureus, Streptococcus agalactiae  (Group B), S. pneumoniae, S. pyogenes (Group A),  Acinetobacter baumannii, Enterobacter cloacae, E. coli,  Klebsiella oxytoca, K. pneumoniae, Proteus sp.,  Serratia marcescens, Haemophilus influenzae,  Neisseria meningitidis, Pseudomonas aeruginosa, Candida  albicans, C. glabrata, C krusei, C parapsilosis,  C. tropicalis and the KPC resistance gene.    Organism Identification: Blood Culture PCR  Klebsiella pneumoniae    Method Type: PCR    Method Type: TERESA Patient is seen and examined at the bed side, is afebrile but having multiple episodes of vomiting. He is refusing any antiemesis agents and also antibiotics. The WBC count is normal.         REVIEW OF SYSTEMS: All other review systems are negative         ALLERGIES:  fish (Rash)  liver (Anaphylaxis)  No Known Drug Allergies      Vital Signs Last 24 Hrs  T(C): 37.1 (26 Aug 2019 13:54), Max: 37.4 (25 Aug 2019 19:58)  T(F): 98.8 (26 Aug 2019 13:54), Max: 99.3 (25 Aug 2019 19:58)  HR: 112 (26 Aug 2019 13:54) (105 - 117)  BP: 167/82 (26 Aug 2019 13:54) (110/59 - 197/87)  BP(mean): --  RR: 16 (26 Aug 2019 13:54) (16 - 17)  SpO2: 100% (26 Aug 2019 13:54) (97% - 100%)        PHYSICAL EXAM:  GENERAL: Mild distress due to vomiting   CHEST/LUNG:  Air entry bilaterally  HEART: s1 and s2 present  ABDOMEN:  Nontender and  Nondistended  EXTREMITIES: Left BKA  CNS: Awake and Alert        LABS:                        12.7   8.62  )-----------( 224      ( 26 Aug 2019 15:37 )             38.9                           12.8   13.17 )-----------( 203      ( 24 Aug 2019 19:11 )             39.4                           10.6   12.44 )-----------( 102      ( 22 Aug 2019 18:08 )             32.9           -    138  |  95<L>  |  60<H>  ----------------------------<  188<H>  3.2<L>   |  24  |  11.20<H>    Ca    9.1      26 Aug 2019 15:38  Phos  4.3       Mg     2.7         TPro  8.6<H>  /  Alb  3.4<L>  /  TBili  0.6  /  DBili  x   /  AST  17  /  ALT  16  /  AlkPhos  110  -25    08-24    139  |  95<L>  |  41<H>  ----------------------------<  80  3.4<L>   |  25  |  8.14<H>    Ca    8.6      24 Aug 2019 09:19      08-22    137  |  98  |  13  ----------------------------<  76  4.2   |  31  |  3.62<H>    Ca    8.5      22 Aug 2019 18:08    TPro  7.1  /  Alb  3.0<L>  /  TBili  0.7  /  DBili  x   /  AST  20  /  ALT  13  /  AlkPhos  84    PT/INR - ( 22 Aug 2019 18:08 )   PT: 13.4 sec;   INR: 1.20 ratio    PTT - ( 22 Aug 2019 18:08 )  PTT:32.5 sec        CAPILLARY BLOOD GLUCOSE  POCT Blood Glucose.: 78 mg/dL (23 Aug 2019 11:30)  POCT Blood Glucose.: 88 mg/dL (23 Aug 2019 07:58)  POCT Blood Glucose.: 125 mg/dL (22 Aug 2019 23:34)        Urinalysis Basic - ( 23 Aug 2019 15:52 )  Color: Yellow / Appearance: Slightly Turbid / S.010 / pH: x  Gluc: x / Ketone: Negative  / Bili: Negative / Urobili: Negative   Blood: x / Protein: 100 / Nitrite: Negative   Leuk Esterase: Moderate / RBC: 25-50 /HPF / WBC >50 /HPF   Sq Epi: x / Non Sq Epi: Few /HPF / Bacteria: Moderate /HPF      MEDICATIONS  (STANDING):  chlorhexidine 2% Cloths 1 Application(s) Topical daily  cyanocobalamin 1000 MICROGram(s) Oral daily  dextrose 5% + lactated ringers. 1000 milliLiter(s) (100 mL/Hr) IV Continuous <Continuous>  diphenhydrAMINE 25 milliGRAM(s) Oral at bedtime  epoetin beverley Injectable 4000 Unit(s) IV Push <User Schedule>  fludroCORTISONE 0.1 milliGRAM(s) Oral two times a day  folic acid 1 milliGRAM(s) Oral daily  gabapentin 200 milliGRAM(s) Oral daily  insulin lispro (HumaLOG) corrective regimen sliding scale   SubCutaneous three times a day before meals  meropenem  IVPB 1000 milliGRAM(s) IV Intermittent every 24 hours  metoprolol tartrate Injectable 5 milliGRAM(s) IV Push once  mupirocin 2% Nasal 1 Application(s) Nasal every 12 hours  pantoprazole Infusion 8 mG/Hr (10 mL/Hr) IV Continuous <Continuous>  simvastatin 40 milliGRAM(s) Oral at bedtime  sucralfate suspension 1 Gram(s) Oral four times a day    MEDICATIONS  (PRN):  acetaminophen  Suppository .. 650 milliGRAM(s) Rectal every 6 hours PRN Moderate Pain (4 - 6)  aluminum hydroxide/magnesium hydroxide/simethicone Suspension 30 milliLiter(s) Oral every 4 hours PRN Dyspepsia  metoclopramide Injectable 10 milliGRAM(s) IV Push every 8 hours PRN Nausea and vomiting  ondansetron Injectable 4 milliGRAM(s) IV Push every 8 hours PRN Nausea and/or Vomiting        RADIOLOGY & ADDITIONAL TESTS:    19 : CT Abdomen and Pelvis No Cont (19 @ 11:50) Distal esophageal wall thickening, question esophagitis.      19 : Xray Chest 1 View-PORTABLE IMMEDIATE (19 @ 18:27) The cardiac silhouette is within normal limits. The lungs are clear. No pleural abnormality.            MICROBIOLOGY DATA:      Culture - Blood (19 @ 23:06)    Specimen Source: .Blood    Culture Results:   No growth to date.        Culture - Blood (19 @ 23:06)    Specimen Source: .Blood    Culture Results:   No growth to date.      Culture - Urine (19 @ 02:13)    -  Amikacin: S <=16    -  Ampicillin: R >16 These ampicillin results predict results for amoxicillin    -  Ampicillin/Sulbactam: S <=8/4 Enterobacter, Citrobacter, and Serratia may develop resistance during prolonged therapy (3-4 days)    -  Aztreonam: S <=4    -  Cefazolin: S <=8 (MIC_CL_COM_ENTERIC_CEFAZU) For uncomplicated UTI with K. pneumoniae, E. coli, or P. mirablis: TERESA <=16 is sensitive and TERESA >=32 is resistant. This also predicts results for oral agents cefaclor, cefdinir, cefpodoxime, cefprozil, cefuroxime axetil, cephalexin and locarbef for uncomplicated UTI. Note that some isolates may be susceptible to these agents while testing resistant to cefazolin.    -  Cefepime: S <=4    -  Cefoxitin: S <=8    -  Gentamicin: S <=4    -  Imipenem: S <=1    -  Levofloxacin: S <=2    -  Meropenem: S <=1    -  Nitrofurantoin: I 64 Should not be used to treat pyelonephritis    -  Piperacillin/Tazobactam: S <=16    -  Tigecycline: S <=2    -  Tobramycin: S <=4    -  Trimethoprim/Sulfamethoxazole: S <=2/38    -  Ceftriaxone: S <=1 Enterobacter, Citrobacter, and Serratia may develop resistance during prolonged therapy    -  Ciprofloxacin: S <=1    Specimen Source: .Urine    Culture Results:   50,000 - 99,000 CFU/mL Klebsiella pneumoniae    Organism Identification: Klebsiella pneumoniae    Organism: Klebsiella pneumoniae    Method Type: TERESA      Urine Microscopic-Add On (NC) (19 @ 15:52)    Bacteria: Moderate /HPF    Comment - Urine: moderate amorphous sediments present    Epithelial Cells: Few /HPF    Red Blood Cell - Urine: 25-50 /HPF    White Blood Cell - Urine: >50 /HPF      Culture - Blood (19 @ 01:18)    Specimen Source: .Blood    Culture Results:   No growth to date.      Culture - Blood (19 @ 01:18)    -  Trimethoprim/Sulfamethoxazole: S <=2/38    -  Tobramycin: S <=2    -  Piperacillin/Tazobactam: S <=8    -  Meropenem: S <=1    -  Levofloxacin: S <=2    -  Gentamicin: S <=2    -  Imipenem: S <=1    -  Klebsiella pneumoniae: Detec    Gram Stain:   Growth in aerobic bottle: Gram Variable Rods    -  Amikacin: S <=16    -  Ampicillin: R 16 These ampicillin results predict results for amoxicillin    -  Aztreonam: S <=4    -  Ampicillin/Sulbactam: S <=4/2 Enterobacter, Citrobacter, and Serratia may develop resistance during prolonged therapy (3-4 days)    -  Ceftriaxone: S <=1 Enterobacter, Citrobacter, and Serratia may develop resistance during prolonged therapy    -  Cefoxitin: S <=8    -  Cefepime: S <=2    -  Cefazolin: S <=2 Enterobacter, Citrobacter, and Serratia may develop resistance during prolonged therapy (3-4 days)    -  Ertapenem: S <=0.5    -  Ciprofloxacin: S <=1    Specimen Source: .Blood    Organism: Blood Culture PCR    Organism: Klebsiella pneumoniae    Culture Results:   Growth in aerobic bottle: Klebsiella pneumoniae  "Due to technical problems, Proteus sp. will Not be reported as part of  the BCID panel until further notice"  ***Blood Panel PCR results on this specimen are available  approximately 3 hours after the Gram stain result.***  Gram stain, PCR, and/or culture results may not always  correspond due to difference in methodologies.  ************************************************************  This PCR assay was performed using [employer].  The following targets are tested for: Enterococcus,  vancomycin resistant enterococci, Listeria monocytogenes,  coagulase negative staphylococci, S. aureus,  methicillin resistant S. aureus, Streptococcus agalactiae  (Group B), S. pneumoniae, S. pyogenes (Group A),  Acinetobacter baumannii, Enterobacter cloacae, E. coli,  Klebsiella oxytoca, K. pneumoniae, Proteus sp.,  Serratia marcescens, Haemophilus influenzae,  Neisseria meningitidis, Pseudomonas aeruginosa, Candida  albicans, C. glabrata, C krusei, C parapsilosis,  C. tropicalis and the KPC resistance gene.    Organism Identification: Blood Culture PCR  Klebsiella pneumoniae    Method Type: PCR    Method Type: TERESA

## 2019-08-27 DIAGNOSIS — F43.21 ADJUSTMENT DISORDER WITH DEPRESSED MOOD: ICD-10-CM

## 2019-08-27 DIAGNOSIS — R53.81 OTHER MALAISE: ICD-10-CM

## 2019-08-27 DIAGNOSIS — Z91.14 PATIENT'S OTHER NONCOMPLIANCE WITH MEDICATION REGIMEN: ICD-10-CM

## 2019-08-27 DIAGNOSIS — F14.21 COCAINE DEPENDENCE, IN REMISSION: ICD-10-CM

## 2019-08-27 DIAGNOSIS — Z51.5 ENCOUNTER FOR PALLIATIVE CARE: ICD-10-CM

## 2019-08-27 LAB
ANION GAP SERPL CALC-SCNC: 14 MMOL/L — SIGNIFICANT CHANGE UP (ref 5–17)
BUN SERPL-MCNC: 72 MG/DL — HIGH (ref 7–18)
CALCIUM SERPL-MCNC: 8.8 MG/DL — SIGNIFICANT CHANGE UP (ref 8.4–10.5)
CHLORIDE SERPL-SCNC: 96 MMOL/L — SIGNIFICANT CHANGE UP (ref 96–108)
CO2 SERPL-SCNC: 30 MMOL/L — SIGNIFICANT CHANGE UP (ref 22–31)
CREAT SERPL-MCNC: 14 MG/DL — HIGH (ref 0.5–1.3)
GLUCOSE BLDC GLUCOMTR-MCNC: 120 MG/DL — HIGH (ref 70–99)
GLUCOSE BLDC GLUCOMTR-MCNC: 227 MG/DL — HIGH (ref 70–99)
GLUCOSE BLDC GLUCOMTR-MCNC: 97 MG/DL — SIGNIFICANT CHANGE UP (ref 70–99)
GLUCOSE SERPL-MCNC: 97 MG/DL — SIGNIFICANT CHANGE UP (ref 70–99)
HCT VFR BLD CALC: 36.6 % — LOW (ref 39–50)
HGB BLD-MCNC: 12.4 G/DL — LOW (ref 13–17)
MCHC RBC-ENTMCNC: 30.8 PG — SIGNIFICANT CHANGE UP (ref 27–34)
MCHC RBC-ENTMCNC: 33.9 GM/DL — SIGNIFICANT CHANGE UP (ref 32–36)
MCV RBC AUTO: 90.8 FL — SIGNIFICANT CHANGE UP (ref 80–100)
NRBC # BLD: 0 /100 WBCS — SIGNIFICANT CHANGE UP (ref 0–0)
PLATELET # BLD AUTO: 270 K/UL — SIGNIFICANT CHANGE UP (ref 150–400)
POTASSIUM SERPL-MCNC: 3 MMOL/L — LOW (ref 3.5–5.3)
POTASSIUM SERPL-SCNC: 3 MMOL/L — LOW (ref 3.5–5.3)
RBC # BLD: 4.03 M/UL — LOW (ref 4.2–5.8)
RBC # FLD: 14.9 % — HIGH (ref 10.3–14.5)
SODIUM SERPL-SCNC: 140 MMOL/L — SIGNIFICANT CHANGE UP (ref 135–145)
WBC # BLD: 9.42 K/UL — SIGNIFICANT CHANGE UP (ref 3.8–10.5)
WBC # FLD AUTO: 9.42 K/UL — SIGNIFICANT CHANGE UP (ref 3.8–10.5)

## 2019-08-27 PROCEDURE — 99223 1ST HOSP IP/OBS HIGH 75: CPT

## 2019-08-27 RX ORDER — FLUCONAZOLE 150 MG/1
200 TABLET ORAL EVERY 24 HOURS
Refills: 0 | Status: COMPLETED | OUTPATIENT
Start: 2019-08-27 | End: 2019-08-27

## 2019-08-27 RX ORDER — SODIUM CHLORIDE 9 MG/ML
1000 INJECTION, SOLUTION INTRAVENOUS
Refills: 0 | Status: DISCONTINUED | OUTPATIENT
Start: 2019-08-27 | End: 2019-08-30

## 2019-08-27 RX ORDER — FLUCONAZOLE 150 MG/1
100 TABLET ORAL EVERY 24 HOURS
Refills: 0 | Status: DISCONTINUED | OUTPATIENT
Start: 2019-08-28 | End: 2019-08-30

## 2019-08-27 RX ADMIN — Medication 1 GRAM(S): at 23:20

## 2019-08-27 RX ADMIN — Medication 2: at 08:36

## 2019-08-27 RX ADMIN — SODIUM CHLORIDE 60 MILLILITER(S): 9 INJECTION, SOLUTION INTRAVENOUS at 23:31

## 2019-08-27 RX ADMIN — CHLORHEXIDINE GLUCONATE 1 APPLICATION(S): 213 SOLUTION TOPICAL at 14:18

## 2019-08-27 RX ADMIN — SIMVASTATIN 40 MILLIGRAM(S): 20 TABLET, FILM COATED ORAL at 23:20

## 2019-08-27 RX ADMIN — MEROPENEM 100 MILLIGRAM(S): 1 INJECTION INTRAVENOUS at 19:27

## 2019-08-27 RX ADMIN — Medication 10 MILLIGRAM(S): at 02:51

## 2019-08-27 RX ADMIN — Medication 10 MILLIGRAM(S): at 14:24

## 2019-08-27 RX ADMIN — Medication 25 MILLIGRAM(S): at 23:20

## 2019-08-27 RX ADMIN — ONDANSETRON 4 MILLIGRAM(S): 8 TABLET, FILM COATED ORAL at 10:23

## 2019-08-27 RX ADMIN — FLUCONAZOLE 100 MILLIGRAM(S): 150 TABLET ORAL at 14:21

## 2019-08-27 NOTE — PROGRESS NOTE ADULT - NEGATIVE ENMT SYMPTOMS
no throat pain/no hearing difficulty/no gum bleeding/no nose bleeds/no dry mouth/no dysphagia/no ear pain

## 2019-08-27 NOTE — BEHAVIORAL HEALTH ASSESSMENT NOTE - DETAILS
2 sisters  of HIV Weight loss (very thin) Vision loss L eye Esophagitis Recurrent N/V UTI with sepsis. ESRD on dialysis DM on insulin

## 2019-08-27 NOTE — BEHAVIORAL HEALTH ASSESSMENT NOTE - NSBHSOCIALHXDETAILSFT_PSY_A_CORE
B/R in [facility]. Had 5 siblings (4 sisters, 1 brother) but 3 sisters now  (2 from HIV, 1 from asthma). Was homeless for 10 yrs i/s/o crack cocaine use but stopped using around 2017, now in assisted living. Has 2 sons but has very limited contact (1 lives in [place], 1 in [place]). Brother Georgi King is his HCP.

## 2019-08-27 NOTE — PROGRESS NOTE ADULT - SUBJECTIVE AND OBJECTIVE BOX
[facility] : Progress Note :: [phone], (office [phone]),   Dr Mosher / Dr Washington / Dr Young / Dr Doll / Dr Varsha TURCIOS / Dr Tello / Dr Garcia / Dr Larry qiu  _____________________________________________________________________________________________   seen on HD. Vomitting++    fish (Rash)  liver (Anaphylaxis)  No Known Drug Allergies    Hospital Medications:   MEDICATIONS  (STANDING):  chlorhexidine 2% Cloths 1 Application(s) Topical daily  cyanocobalamin 1000 MICROGram(s) Oral daily  dextrose 5% + lactated ringers. 1000 milliLiter(s) (100 mL/Hr) IV Continuous <Continuous>  diphenhydrAMINE 25 milliGRAM(s) Oral at bedtime  epoetin beverley Injectable 4000 Unit(s) IV Push <User Schedule>  fluconAZOLE IVPB 200 milliGRAM(s) IV Intermittent every 24 hours  fludroCORTISONE 0.1 milliGRAM(s) Oral two times a day  folic acid 1 milliGRAM(s) Oral daily  gabapentin 200 milliGRAM(s) Oral daily  insulin lispro (HumaLOG) corrective regimen sliding scale   SubCutaneous three times a day before meals  meropenem  IVPB 1000 milliGRAM(s) IV Intermittent every 24 hours  mupirocin 2% Nasal 1 Application(s) Nasal every 12 hours  pantoprazole Infusion 8 mG/Hr (10 mL/Hr) IV Continuous <Continuous>  simvastatin 40 milliGRAM(s) Oral at bedtime  sucralfate suspension 1 Gram(s) Oral four times a day      VITALS:  T(F): 98.4 (19 @ 10:43), Max: 99.4 (19 @ 20:39)  HR: 113 (19 @ 10:43)  BP: 211/112 (19 @ 10:43)  RR: 20 (08-27-19 @ 10:43)  SpO2: 99% (19 @ 10:43)  Wt(kg): --      PHYSICAL EXAM:  Constitutional: NAD  HEENT: anicteric sclera, oropharynx clear, MMM  Neck: No JVD  Respiratory: CTAB, no wheezes, rales or rhonchi  Cardiovascular: S1, S2, RRR  Gastrointestinal: BS+, soft, NT/ND  Neurological: A/O x 3, no focal deficits  : No CVA tenderness. No [place].   Skin: No rashes  Vascular Access: AVF cannulated    LABS:      140  |  96  |  72<H>  ----------------------------<  97  3.0<L>   |  30  |  14.00<H>    Ca    8.8      27 Aug 2019 10:58  Phos  4.3     08-  Mg     2.7     08-    TPro  8.6<H>  /  Alb  3.4<L>  /  TBili  0.6  /  DBili      /  AST  17  /  ALT  16  /  AlkPhos  110  08-25    Creatinine Trend: 14.00 <--, 11.20 <--, 8.68 <--, 5.98 <--, 8.14 <--, 3.62 <--                        12.4   9.42  )-----------( 270      ( 27 Aug 2019 10:58 )             36.6     Urine Studies:  Urinalysis Basic - ( 23 Aug 2019 15:52 )    Color: Yellow / Appearance: Slightly Turbid / S.010 / pH:   Gluc:  / Ketone: Negative  / Bili: Negative / Urobili: Negative   Blood:  / Protein: 100 / Nitrite: Negative   Leuk Esterase: Moderate / RBC: 25-50 /HPF / WBC >50 /HPF   Sq Epi:  / Non Sq Epi: Few /HPF / Bacteria: Moderate /HPF        RADIOLOGY & ADDITIONAL STUDIES:

## 2019-08-27 NOTE — CONSULT NOTE ADULT - SUBJECTIVE AND OBJECTIVE BOX
HPI:  59 y/o male from [facility], with PMHx of DM (on insulin), partial vision loss, ESRD (on TTS HD via LUE AVF), s/p left BKA and adrenal insufficiency is sent to the ED for fever during his dialysis session today. Patient reports he completed 4 hours of dialysis PTA. He reports his fever started today and is associated with chills. He denies any cough, shortness of breath, chest pain, abdominal pain, nausea, vomiting, diarrhea, dysuria or increased urinary frequency.    ED course: s/p vanc and zosyn x 1. s/p 1L LR bolus x2.    GOC as per [facility] records: Full code. (22 Aug 2019 22:20)    Interval history: Intermittently refusing meds and IVF. Receiving HD today. Full code on file. Reports ongoing nausea. Palliative consulted to address goals of care      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  Diabetic neuropathy  Diabetes mellitus  Hyperparathyroidism  Spinal stenosis of lumbosacral region  Peripheral vascular disease  HLD (hyperlipidemia)  Coronary artery disease  Glaucoma  Anemia  CKD (chronic kidney disease)  Adrenal insufficiency  Hypertension  History of left cataract extraction  History of right cataract extraction  Below knee amputation status, left      SOCIAL HISTORY:  no children, has brother  Admitted from: [facility]       Surrogate/HCP/Guardian:   Georgi King brother         Phone#: [phone]    FAMILY HISTORY:  Family history of AIDS  Asthma  Family history of cirrhosis of liver    Baseline ADLs (prior to admission): ambulatory w assistance,  alert    Allergies    fish (Rash)  liver (Anaphylaxis)  No Known Drug Allergies    Intolerances      Present Symptoms:   Dyspnea: denies  Nausea/Vomiting: +  Anxiety: denies  Depressed denies  Fatigue: denies  Loss of appetite: +  Pain:     +epigastric, unable to quantify         Review of Systems: Limited due to lethargy    MEDICATIONS  (STANDING):  chlorhexidine 2% Cloths 1 Application(s) Topical daily  cyanocobalamin 1000 MICROGram(s) Oral daily  dextrose 5% + lactated ringers. 1000 milliLiter(s) (100 mL/Hr) IV Continuous <Continuous>  diphenhydrAMINE 25 milliGRAM(s) Oral at bedtime  epoetin beverley Injectable 4000 Unit(s) IV Push <User Schedule>  fluconAZOLE IVPB 200 milliGRAM(s) IV Intermittent every 24 hours  fludroCORTISONE 0.1 milliGRAM(s) Oral two times a day  folic acid 1 milliGRAM(s) Oral daily  gabapentin 200 milliGRAM(s) Oral daily  insulin lispro (HumaLOG) corrective regimen sliding scale   SubCutaneous three times a day before meals  meropenem  IVPB 1000 milliGRAM(s) IV Intermittent every 24 hours  mupirocin 2% Nasal 1 Application(s) Nasal every 12 hours  pantoprazole Infusion 8 mG/Hr (10 mL/Hr) IV Continuous <Continuous>  simvastatin 40 milliGRAM(s) Oral at bedtime  sucralfate suspension 1 Gram(s) Oral four times a day    MEDICATIONS  (PRN):  acetaminophen  Suppository .. 650 milliGRAM(s) Rectal every 6 hours PRN Moderate Pain (4 - 6)  aluminum hydroxide/magnesium hydroxide/simethicone Suspension 30 milliLiter(s) Oral every 4 hours PRN Dyspepsia  metoclopramide Injectable 10 milliGRAM(s) IV Push every 8 hours PRN Nausea and vomiting  ondansetron Injectable 4 milliGRAM(s) IV Push every 8 hours PRN Nausea and/or Vomiting      PHYSICAL EXAM:    Vital Signs Last 24 Hrs  T(C): 36.9 (27 Aug 2019 10:43), Max: 37.4 (26 Aug 2019 20:39)  T(F): 98.4 (27 Aug 2019 10:43), Max: 99.4 (26 Aug 2019 20:39)  HR: 113 (27 Aug 2019 10:43) (99 - 113)  BP: 211/112 (27 Aug 2019 10:43) (146/79 - 211/112)  BP(mean): --  RR: 20 (27 Aug 2019 10:43) (16 - 20)  SpO2: 99% (27 Aug 2019 10:43) (97% - 99%)     Karnofsky Performance Score/Palliative Performance Status Version2: 40    %    GENERAL: lethargic, NAD  HEENT: Atraumatic, oropharynx clear, neck supple  CHEST/LUNG: unlabored  HEART: Regular rate and rhythm    ABDOMEN: Soft, Nontender, Nondistended   MUSCULOSKELETAL:  No  edema, L BKA  NERVOUS SYSTEM:  lethargic, follows simple commands  SKIN: No rashes or lesions noted  Oral intake: npo         LABS:                        12.4   9.42  )-----------( 270      ( 27 Aug 2019 10:58 )             36.6     08-27    140  |  96  |  72<H>  ----------------------------<  97  3.0<L>   |  30  |  14.00<H>    Ca    8.8      27 Aug 2019 10:58  Phos  4.3     08-25  Mg     2.7     08-25    TPro  8.6<H>  /  Alb  3.4<L>  /  TBili  0.6  /  DBili  x   /  AST  17  /  ALT  16  /  AlkPhos  110  08-25        RADIOLOGY & ADDITIONAL STUDIES:    ADVANCE DIRECTIVES: HCP

## 2019-08-27 NOTE — PROGRESS NOTE ADULT - SUBJECTIVE AND OBJECTIVE BOX
Patient is a 59 y/o Male who presents with a chief complaint of sepsis (27 Aug 2019 13:58)      INTERVAL HPI/OVERNIGHT EVENTS: AxOx3, answers questions one line  T(C): 37.1 (08-27-19 @ 14:38), Max: 37.4 (08-26-19 @ 20:39)  HR: 112 (08-27-19 @ 14:38) (99 - 113)  BP: 196/82 (08-27-19 @ 14:38) (106/84 - 211/112)  RR: 18 (08-27-19 @ 14:38) (16 - 20)  SpO2: 98% (08-27-19 @ 14:38) (97% - 100%)  Wt(kg): --  I&O's Summary      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  Diabetic neuropathy  Diabetes mellitus  Hyperparathyroidism  Spinal stenosis of lumbosacral region  Peripheral vascular disease  HLD (hyperlipidemia)  Coronary artery disease  Glaucoma  Anemia  CKD (chronic kidney disease)  Adrenal insufficiency  Hypertension  History of left cataract extraction  History of right cataract extraction  Below knee amputation status, left          LABS:                        12.4   9.42  )-----------( 270      ( 27 Aug 2019 10:58 )             36.6     08-27    140  |  96  |  72<H>  ----------------------------<  97  3.0<L>   |  30  |  14.00<H>    Ca    8.8      27 Aug 2019 10:58  Phos  4.3     08-25  Mg     2.7     08-25    TPro  8.6<H>  /  Alb  3.4<L>  /  TBili  0.6  /  DBili  x   /  AST  17  /  ALT  16  /  AlkPhos  110  08-25        CAPILLARY BLOOD GLUCOSE      POCT Blood Glucose.: 97 mg/dL (27 Aug 2019 10:50)  POCT Blood Glucose.: 227 mg/dL (27 Aug 2019 07:51)  POCT Blood Glucose.: 214 mg/dL (26 Aug 2019 21:23)  POCT Blood Glucose.: 171 mg/dL (26 Aug 2019 16:57)            MEDICATIONS  (STANDING):  chlorhexidine 2% Cloths 1 Application(s) Topical daily  cyanocobalamin 1000 MICROGram(s) Oral daily  dextrose 5% + lactated ringers. 1000 milliLiter(s) (100 mL/Hr) IV Continuous <Continuous>  diphenhydrAMINE 25 milliGRAM(s) Oral at bedtime  epoetin beverley Injectable 4000 Unit(s) IV Push <User Schedule>  fludroCORTISONE 0.1 milliGRAM(s) Oral two times a day  folic acid 1 milliGRAM(s) Oral daily  gabapentin 200 milliGRAM(s) Oral daily  insulin lispro (HumaLOG) corrective regimen sliding scale   SubCutaneous three times a day before meals  meropenem  IVPB 1000 milliGRAM(s) IV Intermittent every 24 hours  mupirocin 2% Nasal 1 Application(s) Nasal every 12 hours  pantoprazole Infusion 8 mG/Hr (10 mL/Hr) IV Continuous <Continuous>  simvastatin 40 milliGRAM(s) Oral at bedtime  sucralfate suspension 1 Gram(s) Oral four times a day    MEDICATIONS  (PRN):  acetaminophen  Suppository .. 650 milliGRAM(s) Rectal every 6 hours PRN Moderate Pain (4 - 6)  aluminum hydroxide/magnesium hydroxide/simethicone Suspension 30 milliLiter(s) Oral every 4 hours PRN Dyspepsia  metoclopramide Injectable 10 milliGRAM(s) IV Push every 8 hours PRN Nausea and vomiting  ondansetron Injectable 4 milliGRAM(s) IV Push every 8 hours PRN Nausea and/or Vomiting      REVIEW OF SYSTEMS:  CONSTITUTIONAL: No fever, weight loss, or fatigue  EYES: No eye pain, visual disturbances, or discharge  ENMT:  No difficulty hearing, tinnitus, vertigo; No sinus or throat pain  NECK: No pain or stiffness  RESPIRATORY: No cough, wheezing, chills or hemoptysis; No shortness of breath  CARDIOVASCULAR: No chest pain, palpitations, dizziness, or leg swelling  GASTROINTESTINAL: No abdominal or epigastric pain. No nausea, vomiting, or hematemesis; No diarrhea or constipation. No melena or hematochezia.  GENITOURINARY: No dysuria, frequency, hematuria, or incontinence  NEUROLOGICAL: No headaches, memory loss, loss of strength, numbness, or tremors  SKIN: No itching, burning, rashes, or lesions   LYMPH NODES: No enlarged glands  ENDOCRINE: No heat or cold intolerance; No hair loss  MUSCULOSKELETAL: No joint pain or swelling; No muscle, back, or extremity pain  PSYCHIATRIC: No depression, anxiety, mood swings, or difficulty sleeping  HEME/LYMPH: No easy bruising, or bleeding gums  ALLERY AND IMMUNOLOGIC: No hives or eczema    RADIOLOGY & ADDITIONAL TESTS:    Imaging Personally Reviewed:  [ ] YES  [ ] NO    Consultant(s) Notes Reviewed:  [ ] YES  [ ] NO    PHYSICAL EXAM:  GENERAL: NAD, well-groomed, well-developed  HEAD:  Atraumatic, Normocephalic  EYES: EOMI, PERRLA, conjunctiva and sclera clear  ENMT: No tonsillar erythema, exudates, or enlargement; Moist mucous membranes, Good dentition, No lesions  NECK: Supple, No JVD, Normal thyroid  NERVOUS SYSTEM:  Alert & Oriented X3, Good concentration; Motor Strength 5/5 B/L upper and lower extremities; DTRs 2+ intact and symmetric  CHEST/LUNG: Clear to percussion bilaterally; No rales, rhonchi, wheezing, or rubs  HEART: Regular rate and rhythm; No murmurs, rubs, or gallops  ABDOMEN: Soft, Nontender, Nondistended; Bowel sounds present  EXTREMITIES:  2+ Peripheral Pulses, No clubbing, cyanosis, or edema  LYMPH: No lymphadenopathy noted  SKIN: No rashes or lesions    Care Collaborated Discussed with Consultants/Other Providers [ ] YES  [ ] NO

## 2019-08-27 NOTE — BEHAVIORAL HEALTH ASSESSMENT NOTE - HPI (INCLUDE ILLNESS QUALITY, SEVERITY, DURATION, TIMING, CONTEXT, MODIFYING FACTORS, ASSOCIATED SIGNS AND SYMPTOMS)
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 Psych consult was requested for capacity eval due to pt's poor cooperation with care (refusing all PO meds as well as many IV meds). Pt was seen in his room on 4N, initially lying in bed with blanket over his head but cooperating with interview after encouragement. Pt describes mood as "I feel like crap," and he retches and vomits into a basin numerous times during interview. However, pt denies any intent to refuse care, saying he knows he is seriously ill and needs to take medications so he can get better ("I'm not refusing anything"). MD reiterates that pt has sepsis which must be treated with ABX, or pt could die. Pt says he understands and has no wish to die. Pt firmly denies any passive or active SI or thoughts of harming himself. Pt relates that he was previously homeless for 10 yrs due to drug use (crack cocaine), but 2 1/2 years ago he made the decision to go to rehab, after which he obtained his current housing. Pt says he understands how important that step was for him, and he is determined to take better care of himself now. When asked why he has been refusing care so often recently, pt reiterates that he does not actively intend to refuse, but acknowledges that he can be "stubborn" and insist that care is delivered on his terms. Pt states that he continues to want care going forward. Pt says his brother Georgi is an important support for him and often encourages him to get the care he needs. MD calls brother to discuss pt's care with him, but there is no answer and brother's VM is full.

## 2019-08-27 NOTE — PROGRESS NOTE ADULT - SUBJECTIVE AND OBJECTIVE BOX
[   ] ICU                                          [   ] CCU                                      [ X  ] Medical Floor      Patient is comfortable. No new complaints reported, No abdominal pain, N/V, hematemesis, hematochezia, melena, fever, chills, chest pain, SOB, cough or diarrhea reported.    VITALS  Vital Signs Last 24 Hrs  T(C): 37.1 (27 Aug 2019 14:38), Max: 37.2 (27 Aug 2019 00:13)  T(F): 98.8 (27 Aug 2019 14:38), Max: 99 (27 Aug 2019 00:13)  HR: 112 (27 Aug 2019 14:38) (107 - 113)  BP: 196/82 (27 Aug 2019 14:38) (106/84 - 211/112)   RR: 18 (27 Aug 2019 14:38) (16 - 20)  SpO2: 98% (27 Aug 2019 14:38) (97% - 100%)       MEDICATIONS  (STANDING):  chlorhexidine 2% Cloths 1 Application(s) Topical daily  cyanocobalamin 1000 MICROGram(s) Oral daily  dextrose 5% + lactated ringers. 1000 milliLiter(s) (100 mL/Hr) IV Continuous <Continuous>  diphenhydrAMINE 25 milliGRAM(s) Oral at bedtime  epoetin beverley Injectable 4000 Unit(s) IV Push <User Schedule>  fludroCORTISONE 0.1 milliGRAM(s) Oral two times a day  folic acid 1 milliGRAM(s) Oral daily  gabapentin 200 milliGRAM(s) Oral daily  insulin lispro (HumaLOG) corrective regimen sliding scale   SubCutaneous three times a day before meals  meropenem  IVPB 1000 milliGRAM(s) IV Intermittent every 24 hours  mupirocin 2% Nasal 1 Application(s) Nasal every 12 hours  pantoprazole Infusion 8 mG/Hr (10 mL/Hr) IV Continuous <Continuous>  simvastatin 40 milliGRAM(s) Oral at bedtime  sucralfate suspension 1 Gram(s) Oral four times a day    MEDICATIONS  (PRN):  acetaminophen  Suppository .. 650 milliGRAM(s) Rectal every 6 hours PRN Moderate Pain (4 - 6)  aluminum hydroxide/magnesium hydroxide/simethicone Suspension 30 milliLiter(s) Oral every 4 hours PRN Dyspepsia  metoclopramide Injectable 10 milliGRAM(s) IV Push every 8 hours PRN Nausea and vomiting  ondansetron Injectable 4 milliGRAM(s) IV Push every 8 hours PRN Nausea and/or Vomiting                            12.4   9.42  )-----------( 270      ( 27 Aug 2019 10:58 )             36.6       08-27    140  |  96  |  72<H>  ----------------------------<  97  3.0<L>   |  30  |  14.00<H>    Ca    8.8      27 Aug 2019 10:58

## 2019-08-27 NOTE — PROGRESS NOTE ADULT - SUBJECTIVE AND OBJECTIVE BOX
Patient is a 59 y/o  Male who presents with a chief complaint of sepsis.        INTERVAL HPI/OVERNIGHT EVENTS:   T(C): 36.9 (08-27-19 @ 10:43), Max: 37.4 (08-26-19 @ 20:39)  HR: 113 (08-27-19 @ 10:43) (99 - 113)  BP: 211/112 (08-27-19 @ 10:43) (146/79 - 211/112)  RR: 20 (08-27-19 @ 10:43) (16 - 20)  SpO2: 99% (08-27-19 @ 10:43) (97% - 100%)  Wt(kg): --  I&O's Summary      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  Diabetic neuropathy  Diabetes mellitus  Hyperparathyroidism  Spinal stenosis of lumbosacral region  Peripheral vascular disease  HLD (hyperlipidemia)  Coronary artery disease  Glaucoma  Anemia  CKD (chronic kidney disease)  Adrenal insufficiency  Hypertension  History of left cataract extraction  History of right cataract extraction  Below knee amputation status, left          LABS:                        12.4   9.42  )-----------( 270      ( 27 Aug 2019 10:58 )             36.6     08-27    140  |  96  |  72<H>  ----------------------------<  97  3.0<L>   |  30  |  14.00<H>    Ca    8.8      27 Aug 2019 10:58  Phos  4.3     08-25  Mg     2.7     08-25    TPro  8.6<H>  /  Alb  3.4<L>  /  TBili  0.6  /  DBili  x   /  AST  17  /  ALT  16  /  AlkPhos  110  08-25        CAPILLARY BLOOD GLUCOSE      POCT Blood Glucose.: 97 mg/dL (27 Aug 2019 10:50)  POCT Blood Glucose.: 227 mg/dL (27 Aug 2019 07:51)  POCT Blood Glucose.: 214 mg/dL (26 Aug 2019 21:23)  POCT Blood Glucose.: 171 mg/dL (26 Aug 2019 16:57)            MEDICATIONS  (STANDING):  chlorhexidine 2% Cloths 1 Application(s) Topical daily  cyanocobalamin 1000 MICROGram(s) Oral daily  dextrose 5% + lactated ringers. 1000 milliLiter(s) (100 mL/Hr) IV Continuous <Continuous>  diphenhydrAMINE 25 milliGRAM(s) Oral at bedtime  epoetin beverley Injectable 4000 Unit(s) IV Push <User Schedule>  fluconAZOLE IVPB 200 milliGRAM(s) IV Intermittent every 24 hours  fludroCORTISONE 0.1 milliGRAM(s) Oral two times a day  folic acid 1 milliGRAM(s) Oral daily  gabapentin 200 milliGRAM(s) Oral daily  insulin lispro (HumaLOG) corrective regimen sliding scale   SubCutaneous three times a day before meals  meropenem  IVPB 1000 milliGRAM(s) IV Intermittent every 24 hours  mupirocin 2% Nasal 1 Application(s) Nasal every 12 hours  pantoprazole Infusion 8 mG/Hr (10 mL/Hr) IV Continuous <Continuous>  simvastatin 40 milliGRAM(s) Oral at bedtime  sucralfate suspension 1 Gram(s) Oral four times a day    MEDICATIONS  (PRN):  acetaminophen  Suppository .. 650 milliGRAM(s) Rectal every 6 hours PRN Moderate Pain (4 - 6)  aluminum hydroxide/magnesium hydroxide/simethicone Suspension 30 milliLiter(s) Oral every 4 hours PRN Dyspepsia  metoclopramide Injectable 10 milliGRAM(s) IV Push every 8 hours PRN Nausea and vomiting  ondansetron Injectable 4 milliGRAM(s) IV Push every 8 hours PRN Nausea and/or Vomiting      REVIEW OF SYSTEMS:  CONSTITUTIONAL: No fever, weight loss, or fatigue  EYES: No eye pain, visual disturbances, or discharge  ENMT:  No difficulty hearing, tinnitus, vertigo; No sinus or throat pain  NECK: No pain or stiffness  RESPIRATORY: No cough, wheezing, chills or hemoptysis; No shortness of breath  CARDIOVASCULAR: No chest pain, palpitations, dizziness, or leg swelling  GASTROINTESTINAL: No abdominal or epigastric pain. No nausea, vomiting, or hematemesis; No diarrhea or constipation. No melena or hematochezia.  GENITOURINARY: No dysuria, frequency, hematuria, or incontinence  NEUROLOGICAL: No headaches, memory loss, loss of strength, numbness, or tremors  SKIN: No itching, burning, rashes, or lesions   LYMPH NODES: No enlarged glands  ENDOCRINE: No heat or cold intolerance; No hair loss  MUSCULOSKELETAL: No joint pain or swelling; No muscle, back, or extremity pain  PSYCHIATRIC: No depression, anxiety, mood swings, or difficulty sleeping  HEME/LYMPH: No easy bruising, or bleeding gums  ALLERGY AND IMMUNOLOGIC: No hives or eczema    RADIOLOGY & ADDITIONAL TESTS:  < from: CT Abdomen and Pelvis No Cont (08.26.19 @ 11:50) >  EXAM:  CT ABDOMEN AND PELVIS                            PROCEDURE DATE:  08/26/2019          INTERPRETATION:  CLINICAL INFORMATION: Coffee ground emesis    COMPARISON: 12/11/2017    PROCEDURE:   CT of the Abdomen and Pelvis was performed without intravenous contrast.   Intravenous contrast: None.  Oral contrast: None.  Sagittal and coronal reformats were performed.    FINDINGS:    Multiple images are degraded by motion.    LOWER CHEST: Clear lung bases. Distal esophageal wall thickening.    Please note that evaluation of the abdominal organs and vascular   structures is limited by lack of intravenous contrast.    LIVER: Within normal limits.  BILE DUCTS: Mild dilatation.  GALLBLADDER: Within normal limits.  SPLEEN: Within normal limits.  PANCREAS: Within normal limits.  ADRENALS: Mild thickening.  KIDNEYS/URETERS: Right renal hypodensity, possibly cyst. No   hydronephrosis.    BLADDER: Minimally distended. Wall thickening.  REPRODUCTIVE ORGANS: Prostate is enlarged.    BOWEL: No bowel obstruction. Appendix within normal limits.  PERITONEUM: No ascites.  VESSELS: Atherosclerotic calcifications.  RETROPERITONEUM/LYMPH NODES: No lymphadenopathy.    ABDOMINAL WALL: Within normal limits.  BONES: Mild spinal degenerative changes. T11 and T12 compression   deformities, stable.    IMPRESSION:     Distal esophageal wall thickening, question esophagitis.            < end of copied text >      Imaging Personally Reviewed:  [x ] YES  [ ] NO    Consultant(s) Notes Reviewed:  [x ] YES  [ ] NO    PHYSICAL EXAM:  GENERAL: NAD, well-groomed, well-developed  HEAD:  Atraumatic, Normocephalic  EYES: EOMI, PERRLA, conjunctiva and sclera clear  ENMT: No tonsillar erythema, exudates, or enlargement; Moist mucous membranes, Good dentition, No lesions  NECK: Supple, No JVD, Normal thyroid  NERVOUS SYSTEM:  Alert & Oriented X3, Good concentration; Motor Strength 5/5 B/L upper and lower extremities; DTRs 2+ intact and symmetric  CHEST/LUNG: Clear to percussion bilaterally; No rales, rhonchi, wheezing, or rubs  HEART: Regular rate and rhythm; No murmurs, rubs, or gallops  ABDOMEN: Soft, Nontender, Nondistended; Bowel sounds present  EXTREMITIES:  2+ Peripheral Pulses, No clubbing, cyanosis, or edema  LYMPH: No lymphadenopathy noted  SKIN: No rashes or lesions    Care Collaborated Discussed with Consultants/Other Providers [ ] YES  [ ] NO Patient is a 59 y/o Male admitted for sepsis secondary to UTI and gm (-) klebsiella/bacteremia.      INTERVAL HPI/OVERNIGHT EVENTS: AxOx2, denies headache, dizziness, no nausea or vomiting, on NPO with ice chips, verbal responses limited by single responses  T(C): 36.9 (08-27-19 @ 10:43), Max: 37.4 (08-26-19 @ 20:39)  HR: 113 (08-27-19 @ 10:43) (99 - 113)  BP: 211/112 (08-27-19 @ 10:43) (146/79 - 211/112)  RR: 20 (08-27-19 @ 10:43) (16 - 20)  SpO2: 99% (08-27-19 @ 10:43) (97% - 100%)  Wt(kg): --  I&O's Summary      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  Diabetic neuropathy  Diabetes mellitus  Hyperparathyroidism  Spinal stenosis of lumbosacral region  Peripheral vascular disease  HLD (hyperlipidemia)  Coronary artery disease  Glaucoma  Anemia  CKD (chronic kidney disease)  Adrenal insufficiency  Hypertension  History of left cataract extraction  History of right cataract extraction  Below knee amputation status, left          LABS:                        12.4   9.42  )-----------( 270      ( 27 Aug 2019 10:58 )             36.6     08-27    140  |  96  |  72<H>  ----------------------------<  97  3.0<L>   |  30  |  14.00<H>    Ca    8.8      27 Aug 2019 10:58  Phos  4.3     08-25  Mg     2.7     08-25    TPro  8.6<H>  /  Alb  3.4<L>  /  TBili  0.6  /  DBili  x   /  AST  17  /  ALT  16  /  AlkPhos  110  08-25        CAPILLARY BLOOD GLUCOSE      POCT Blood Glucose.: 97 mg/dL (27 Aug 2019 10:50)  POCT Blood Glucose.: 227 mg/dL (27 Aug 2019 07:51)  POCT Blood Glucose.: 214 mg/dL (26 Aug 2019 21:23)  POCT Blood Glucose.: 171 mg/dL (26 Aug 2019 16:57)            MEDICATIONS  (STANDING):  chlorhexidine 2% Cloths 1 Application(s) Topical daily  cyanocobalamin 1000 MICROGram(s) Oral daily  dextrose 5% + lactated ringers. 1000 milliLiter(s) (100 mL/Hr) IV Continuous <Continuous>  diphenhydrAMINE 25 milliGRAM(s) Oral at bedtime  epoetin beverley Injectable 4000 Unit(s) IV Push <User Schedule>  fluconAZOLE IVPB 200 milliGRAM(s) IV Intermittent every 24 hours  fludroCORTISONE 0.1 milliGRAM(s) Oral two times a day  folic acid 1 milliGRAM(s) Oral daily  gabapentin 200 milliGRAM(s) Oral daily  insulin lispro (HumaLOG) corrective regimen sliding scale   SubCutaneous three times a day before meals  meropenem  IVPB 1000 milliGRAM(s) IV Intermittent every 24 hours  mupirocin 2% Nasal 1 Application(s) Nasal every 12 hours  pantoprazole Infusion 8 mG/Hr (10 mL/Hr) IV Continuous <Continuous>  simvastatin 40 milliGRAM(s) Oral at bedtime  sucralfate suspension 1 Gram(s) Oral four times a day    MEDICATIONS  (PRN):  acetaminophen  Suppository .. 650 milliGRAM(s) Rectal every 6 hours PRN Moderate Pain (4 - 6)  aluminum hydroxide/magnesium hydroxide/simethicone Suspension 30 milliLiter(s) Oral every 4 hours PRN Dyspepsia  metoclopramide Injectable 10 milliGRAM(s) IV Push every 8 hours PRN Nausea and vomiting  ondansetron Injectable 4 milliGRAM(s) IV Push every 8 hours PRN Nausea and/or Vomiting      REVIEW OF SYSTEMS:  CONSTITUTIONAL: No fever, weight loss, or fatigue  EYES: No eye pain, visual disturbances, or discharge  ENMT:  No difficulty hearing, tinnitus, vertigo; No sinus or throat pain  NECK: No pain or stiffness  RESPIRATORY: No cough, wheezing, chills or hemoptysis; No shortness of breath  CARDIOVASCULAR: No chest pain, palpitations, dizziness, or leg swelling  GASTROINTESTINAL: No abdominal or epigastric pain. No nausea, vomiting, or hematemesis; No diarrhea or constipation. No melena or hematochezia.  GENITOURINARY: No dysuria, frequency, hematuria, or incontinence  NEUROLOGICAL: No headaches, memory loss, loss of strength, numbness, or tremors  SKIN: No itching, burning, rashes, or lesions   ENDOCRINE: No heat or cold intolerance; No hair loss  MUSCULOSKELETAL: No joint pain or swelling; No muscle, back, or extremity pain  PSYCHIATRIC: mood swings,  HEME/LYMPH: No easy bruising, or bleeding gums  ALLERGY AND IMMUNOLOGIC: No hives or eczema    RADIOLOGY & ADDITIONAL TESTS:  < from: CT Abdomen and Pelvis No Cont (08.26.19 @ 11:50) >  EXAM:  CT ABDOMEN AND PELVIS                            PROCEDURE DATE:  08/26/2019          INTERPRETATION:  CLINICAL INFORMATION: Coffee ground emesis    COMPARISON: 12/11/2017    PROCEDURE:   CT of the Abdomen and Pelvis was performed without intravenous contrast.   Intravenous contrast: None.  Oral contrast: None.  Sagittal and coronal reformats were performed.    FINDINGS:    Multiple images are degraded by motion.    LOWER CHEST: Clear lung bases. Distal esophageal wall thickening.    Please note that evaluation of the abdominal organs and vascular   structures is limited by lack of intravenous contrast.    LIVER: Within normal limits.  BILE DUCTS: Mild dilatation.  GALLBLADDER: Within normal limits.  SPLEEN: Within normal limits.  PANCREAS: Within normal limits.  ADRENALS: Mild thickening.  KIDNEYS/URETERS: Right renal hypodensity, possibly cyst. No   hydronephrosis.    BLADDER: Minimally distended. Wall thickening.  REPRODUCTIVE ORGANS: Prostate is enlarged.    BOWEL: No bowel obstruction. Appendix within normal limits.  PERITONEUM: No ascites.  VESSELS: Atherosclerotic calcifications.  RETROPERITONEUM/LYMPH NODES: No lymphadenopathy.    ABDOMINAL WALL: Within normal limits.  BONES: Mild spinal degenerative changes. T11 and T12 compression   deformities, stable.    IMPRESSION:     Distal esophageal wall thickening, question esophagitis.            < end of copied text >      Imaging Personally Reviewed:  [x ] YES  [ ] NO    Consultant(s) Notes Reviewed:  [x ] YES  [ ] NO    PHYSICAL EXAM:  GENERAL: NAD, well-groomed, well-developed  HEAD:  Atraumatic, Normocephalic  EYES: EOMI, PERRLA, conjunctiva and sclera clear  ENMT: No tonsillar erythema, exudates, or enlargement; Moist mucous membranes, Good dentition, No lesions  NECK: Supple, No JVD, Normal thyroid  NERVOUS SYSTEM:  Alert & Oriented X3,   CHEST/LUNG: Clear to percussion bilaterally; No rales, rhonchi, wheezing, or rubs  HEART: Regular rate and rhythm; No murmurs, rubs, or gallops  ABDOMEN: Soft, Nontender, Nondistended; Bowel sounds present  EXTREMITIES:  2+ Peripheral Pulses, No clubbing, cyanosis, or edema, s/p Lt BKA  LYMPH: No lymphadenopathy noted  SKIN: No rashes or lesions    Care Collaborated Discussed with Consultants/Other Providers [ ] YES  [ ] NO

## 2019-08-27 NOTE — PROGRESS NOTE ADULT - SUBJECTIVE AND OBJECTIVE BOX
Patient is seen and examined at the bed side, is afebrile but having multiple episodes of vomiting. He is refusing any antiemesis agents and also antibiotics. The WBC count is normal.         REVIEW OF SYSTEMS: All other review systems are negative         ALLERGIES:  fish (Rash)  liver (Anaphylaxis)  No Known Drug Allergies        Vital Signs Last 24 Hrs  T(C): 37.1 (27 Aug 2019 14:38), Max: 37.4 (26 Aug 2019 20:39)  T(F): 98.8 (27 Aug 2019 14:38), Max: 99.4 (26 Aug 2019 20:39)  HR: 112 (27 Aug 2019 14:38) (99 - 113)  BP: 196/82 (27 Aug 2019 14:38) (106/84 - 211/112)  BP(mean): --  RR: 18 (27 Aug 2019 14:38) (16 - 20)  SpO2: 98% (27 Aug 2019 14:38) (97% - 100%)      PHYSICAL EXAM:  GENERAL: Mild distress due to vomiting   CHEST/LUNG:  Air entry bilaterally  HEART: s1 and s2 present  ABDOMEN:  Nontender and  Nondistended  EXTREMITIES: Left BKA  CNS: Awake and Alert        LABS:                        12.4   9.42  )-----------( 270      ( 27 Aug 2019 10:58 )             36.6                           12.7   8.62  )-----------( 224      ( 26 Aug 2019 15:37 )             38.9                           12.8   13.17 )-----------( 203      ( 24 Aug 2019 19:11 )             39.4             140  |  96  |  72<H>  ----------------------------<  97  3.0<L>   |  30  |  14.00<H>    Ca    8.8      27 Aug 2019 10:58  Phos  4.3     08-25  Mg     2.7     -25    TPro  8.6<H>  /  Alb  3.4<L>  /  TBili  0.6  /  DBili  x   /  AST  17  /  ALT  16  /  AlkPhos  110  -      08-    138  |  95<L>  |  60<H>  ----------------------------<  188<H>  3.2<L>   |  24  |  11.20<H>    Ca    9.1      26 Aug 2019 15:38  Phos  4.3       Mg     2.7         TPro  8.6<H>  /  Alb  3.4<L>  /  TBili  0.6  /  DBili  x   /  AST  17  /  ALT  16  /  AlkPhos  110      139  |  95<L>  |  41<H>  ----------------------------<  80  3.4<L>   |  25  |  8.14<H>    Ca    8.6      24 Aug 2019 09:19        CAPILLARY BLOOD GLUCOSE  POCT Blood Glucose.: 78 mg/dL (23 Aug 2019 11:30)  POCT Blood Glucose.: 88 mg/dL (23 Aug 2019 07:58)  POCT Blood Glucose.: 125 mg/dL (22 Aug 2019 23:34)        Urinalysis Basic - ( 23 Aug 2019 15:52 )  Color: Yellow / Appearance: Slightly Turbid / S.010 / pH: x  Gluc: x / Ketone: Negative  / Bili: Negative / Urobili: Negative   Blood: x / Protein: 100 / Nitrite: Negative   Leuk Esterase: Moderate / RBC: 25-50 /HPF / WBC >50 /HPF   Sq Epi: x / Non Sq Epi: Few /HPF / Bacteria: Moderate /HPF        MEDICATIONS  (STANDING):  chlorhexidine 2% Cloths 1 Application(s) Topical daily  cyanocobalamin 1000 MICROGram(s) Oral daily  dextrose 5% + lactated ringers. 1000 milliLiter(s) (100 mL/Hr) IV Continuous <Continuous>  diphenhydrAMINE 25 milliGRAM(s) Oral at bedtime  epoetin beverley Injectable 4000 Unit(s) IV Push <User Schedule>  fludroCORTISONE 0.1 milliGRAM(s) Oral two times a day  folic acid 1 milliGRAM(s) Oral daily  gabapentin 200 milliGRAM(s) Oral daily  insulin lispro (HumaLOG) corrective regimen sliding scale   SubCutaneous three times a day before meals  meropenem  IVPB 1000 milliGRAM(s) IV Intermittent every 24 hours  mupirocin 2% Nasal 1 Application(s) Nasal every 12 hours  pantoprazole Infusion 8 mG/Hr (10 mL/Hr) IV Continuous <Continuous>  simvastatin 40 milliGRAM(s) Oral at bedtime  sucralfate suspension 1 Gram(s) Oral four times a day    MEDICATIONS  (PRN):  acetaminophen  Suppository .. 650 milliGRAM(s) Rectal every 6 hours PRN Moderate Pain (4 - 6)  aluminum hydroxide/magnesium hydroxide/simethicone Suspension 30 milliLiter(s) Oral every 4 hours PRN Dyspepsia  metoclopramide Injectable 10 milliGRAM(s) IV Push every 8 hours PRN Nausea and vomiting  ondansetron Injectable 4 milliGRAM(s) IV Push every 8 hours PRN Nausea and/or Vomiting        RADIOLOGY & ADDITIONAL TESTS:    19 : CT Abdomen and Pelvis No Cont (19 @ 11:50) Distal esophageal wall thickening, question esophagitis.      19 : Xray Chest 1 View-PORTABLE IMMEDIATE (19 @ 18:27) The cardiac silhouette is within normal limits. The lungs are clear. No pleural abnormality.            MICROBIOLOGY DATA:      Culture - Blood (19 @ 23:06)    Specimen Source: .Blood    Culture Results:   No growth to date.        Culture - Blood (19 @ 23:06)    Specimen Source: .Blood    Culture Results:   No growth to date.      Culture - Urine (19 @ 02:13)    -  Amikacin: S <=16    -  Ampicillin: R >16 These ampicillin results predict results for amoxicillin    -  Ampicillin/Sulbactam: S <=8/4 Enterobacter, Citrobacter, and Serratia may develop resistance during prolonged therapy (3-4 days)    -  Aztreonam: S <=4    -  Cefazolin: S <=8 (MIC_CL_COM_ENTERIC_CEFAZU) For uncomplicated UTI with K. pneumoniae, E. coli, or P. mirablis: TERESA <=16 is sensitive and TERESA >=32 is resistant. This also predicts results for oral agents cefaclor, cefdinir, cefpodoxime, cefprozil, cefuroxime axetil, cephalexin and locarbef for uncomplicated UTI. Note that some isolates may be susceptible to these agents while testing resistant to cefazolin.    -  Cefepime: S <=4    -  Cefoxitin: S <=8    -  Gentamicin: S <=4    -  Imipenem: S <=1    -  Levofloxacin: S <=2    -  Meropenem: S <=1    -  Nitrofurantoin: I 64 Should not be used to treat pyelonephritis    -  Piperacillin/Tazobactam: S <=16    -  Tigecycline: S <=2    -  Tobramycin: S <=4    -  Trimethoprim/Sulfamethoxazole: S <=2/38    -  Ceftriaxone: S <=1 Enterobacter, Citrobacter, and Serratia may develop resistance during prolonged therapy    -  Ciprofloxacin: S <=1    Specimen Source: .Urine    Culture Results:   50,000 - 99,000 CFU/mL Klebsiella pneumoniae    Organism Identification: Klebsiella pneumoniae    Organism: Klebsiella pneumoniae    Method Type: TERESA      Urine Microscopic-Add On (NC) (19 @ 15:52)    Bacteria: Moderate /HPF    Comment - Urine: moderate amorphous sediments present    Epithelial Cells: Few /HPF    Red Blood Cell - Urine: 25-50 /HPF    White Blood Cell - Urine: >50 /HPF      Culture - Blood (19 @ 01:18)    Specimen Source: .Blood    Culture Results:   No growth to date.      Culture - Blood (19 @ 01:18)    -  Trimethoprim/Sulfamethoxazole: S <=2/38    -  Tobramycin: S <=2    -  Piperacillin/Tazobactam: S <=8    -  Meropenem: S <=1    -  Levofloxacin: S <=2    -  Gentamicin: S <=2    -  Imipenem: S <=1    -  Klebsiella pneumoniae: Detec    Gram Stain:   Growth in aerobic bottle: Gram Variable Rods    -  Amikacin: S <=16    -  Ampicillin: R 16 These ampicillin results predict results for amoxicillin    -  Aztreonam: S <=4    -  Ampicillin/Sulbactam: S <=4/2 Enterobacter, Citrobacter, and Serratia may develop resistance during prolonged therapy (3-4 days)    -  Ceftriaxone: S <=1 Enterobacter, Citrobacter, and Serratia may develop resistance during prolonged therapy    -  Cefoxitin: S <=8    -  Cefepime: S <=2    -  Cefazolin: S <=2 Enterobacter, Citrobacter, and Serratia may develop resistance during prolonged therapy (3-4 days)    -  Ertapenem: S <=0.5    -  Ciprofloxacin: S <=1    Specimen Source: .Blood    Organism: Blood Culture PCR    Organism: Klebsiella pneumoniae    Culture Results:   Growth in aerobic bottle: Klebsiella pneumoniae  "Due to technical problems, Proteus sp. will Not be reported as part of  the BCID panel until further notice"  ***Blood Panel PCR results on this specimen are available  approximately 3 hours after the Gram stain result.***  Gram stain, PCR, and/or culture results may not always  correspond due to difference in methodologies.  ************************************************************  This PCR assay was performed using [employer].  The following targets are tested for: Enterococcus,  vancomycin resistant enterococci, Listeria monocytogenes,  coagulase negative staphylococci, S. aureus,  methicillin resistant S. aureus, Streptococcus agalactiae  (Group B), S. pneumoniae, S. pyogenes (Group A),  Acinetobacter baumannii, Enterobacter cloacae, E. coli,  Klebsiella oxytoca, K. pneumoniae, Proteus sp.,  Serratia marcescens, Haemophilus influenzae,  Neisseria meningitidis, Pseudomonas aeruginosa, Candida  albicans, C. glabrata, C krusei, C parapsilosis,  C. tropicalis and the KPC resistance gene.    Organism Identification: Blood Culture PCR  Klebsiella pneumoniae    Method Type: PCR    Method Type: TERESA Patient is seen and examined at the bed side, is afebrile today and agrees to take Reglan and Antibiotic but no oral Meds. The WBC count is normal.         REVIEW OF SYSTEMS: All other review systems are negative         ALLERGIES:  fish (Rash)  liver (Anaphylaxis)  No Known Drug Allergies        Vital Signs Last 24 Hrs  T(C): 37.1 (27 Aug 2019 14:38), Max: 37.4 (26 Aug 2019 20:39)  T(F): 98.8 (27 Aug 2019 14:38), Max: 99.4 (26 Aug 2019 20:39)  HR: 112 (27 Aug 2019 14:38) (99 - 113)  BP: 196/82 (27 Aug 2019 14:38) (106/84 - 211/112)  BP(mean): --  RR: 18 (27 Aug 2019 14:38) (16 - 20)  SpO2: 98% (27 Aug 2019 14:38) (97% - 100%)      PHYSICAL EXAM:  GENERAL: Mild distress due to vomiting   CHEST/LUNG:  Air entry bilaterally  HEART: s1 and s2 present  ABDOMEN:  Nontender and  Nondistended  EXTREMITIES: Left BKA  CNS: Awake and Alert        LABS:                        12.4   9.42  )-----------( 270      ( 27 Aug 2019 10:58 )             36.6                           12.7   8.62  )-----------( 224      ( 26 Aug 2019 15:37 )             38.9                           12.8   13.17 )-----------( 203      ( 24 Aug 2019 19:11 )             39.4         08-    140  |  96  |  72<H>  ----------------------------<  97  3.0<L>   |  30  |  14.00<H>    Ca    8.8      27 Aug 2019 10:58  Phos  4.3     08-  Mg     2.7     -25    TPro  8.6<H>  /  Alb  3.4<L>  /  TBili  0.6  /  DBili  x   /  AST  17  /  ALT  16  /  AlkPhos  110  -      08-26    138  |  95<L>  |  60<H>  ----------------------------<  188<H>  3.2<L>   |  24  |  11.20<H>    Ca    9.1      26 Aug 2019 15:38  Phos  4.3       Mg     2.7         TPro  8.6<H>  /  Alb  3.4<L>  /  TBili  0.6  /  DBili  x   /  AST  17  /  ALT  16  /  AlkPhos  110  -    08-    139  |  95<L>  |  41<H>  ----------------------------<  80  3.4<L>   |  25  |  8.14<H>    Ca    8.6      24 Aug 2019 09:19        CAPILLARY BLOOD GLUCOSE  POCT Blood Glucose.: 78 mg/dL (23 Aug 2019 11:30)  POCT Blood Glucose.: 88 mg/dL (23 Aug 2019 07:58)  POCT Blood Glucose.: 125 mg/dL (22 Aug 2019 23:34)        Urinalysis Basic - ( 23 Aug 2019 15:52 )  Color: Yellow / Appearance: Slightly Turbid / S.010 / pH: x  Gluc: x / Ketone: Negative  / Bili: Negative / Urobili: Negative   Blood: x / Protein: 100 / Nitrite: Negative   Leuk Esterase: Moderate / RBC: 25-50 /HPF / WBC >50 /HPF   Sq Epi: x / Non Sq Epi: Few /HPF / Bacteria: Moderate /HPF        MEDICATIONS  (STANDING):  chlorhexidine 2% Cloths 1 Application(s) Topical daily  cyanocobalamin 1000 MICROGram(s) Oral daily  dextrose 5% + lactated ringers. 1000 milliLiter(s) (100 mL/Hr) IV Continuous <Continuous>  diphenhydrAMINE 25 milliGRAM(s) Oral at bedtime  epoetin beverley Injectable 4000 Unit(s) IV Push <User Schedule>  fludroCORTISONE 0.1 milliGRAM(s) Oral two times a day  folic acid 1 milliGRAM(s) Oral daily  gabapentin 200 milliGRAM(s) Oral daily  insulin lispro (HumaLOG) corrective regimen sliding scale   SubCutaneous three times a day before meals  meropenem  IVPB 1000 milliGRAM(s) IV Intermittent every 24 hours  mupirocin 2% Nasal 1 Application(s) Nasal every 12 hours  pantoprazole Infusion 8 mG/Hr (10 mL/Hr) IV Continuous <Continuous>  simvastatin 40 milliGRAM(s) Oral at bedtime  sucralfate suspension 1 Gram(s) Oral four times a day    MEDICATIONS  (PRN):  acetaminophen  Suppository .. 650 milliGRAM(s) Rectal every 6 hours PRN Moderate Pain (4 - 6)  aluminum hydroxide/magnesium hydroxide/simethicone Suspension 30 milliLiter(s) Oral every 4 hours PRN Dyspepsia  metoclopramide Injectable 10 milliGRAM(s) IV Push every 8 hours PRN Nausea and vomiting  ondansetron Injectable 4 milliGRAM(s) IV Push every 8 hours PRN Nausea and/or Vomiting        RADIOLOGY & ADDITIONAL TESTS:    19 : CT Abdomen and Pelvis No Cont (19 @ 11:50) Distal esophageal wall thickening, question esophagitis.      19 : Xray Chest 1 View-PORTABLE IMMEDIATE (19 @ 18:27) The cardiac silhouette is within normal limits. The lungs are clear. No pleural abnormality.            MICROBIOLOGY DATA:      Culture - Blood (19 @ 23:06)    Specimen Source: .Blood    Culture Results:   No growth to date.        Culture - Blood (19 @ 23:06)    Specimen Source: .Blood    Culture Results:   No growth to date.      Culture - Urine (19 @ 02:13)    -  Amikacin: S <=16    -  Ampicillin: R >16 These ampicillin results predict results for amoxicillin    -  Ampicillin/Sulbactam: S <=8/4 Enterobacter, Citrobacter, and Serratia may develop resistance during prolonged therapy (3-4 days)    -  Aztreonam: S <=4    -  Cefazolin: S <=8 (MIC_CL_COM_ENTERIC_CEFAZU) For uncomplicated UTI with K. pneumoniae, E. coli, or P. mirablis: TERESA <=16 is sensitive and TERESA >=32 is resistant. This also predicts results for oral agents cefaclor, cefdinir, cefpodoxime, cefprozil, cefuroxime axetil, cephalexin and locarbef for uncomplicated UTI. Note that some isolates may be susceptible to these agents while testing resistant to cefazolin.    -  Cefepime: S <=4    -  Cefoxitin: S <=8    -  Gentamicin: S <=4    -  Imipenem: S <=1    -  Levofloxacin: S <=2    -  Meropenem: S <=1    -  Nitrofurantoin: I 64 Should not be used to treat pyelonephritis    -  Piperacillin/Tazobactam: S <=16    -  Tigecycline: S <=2    -  Tobramycin: S <=4    -  Trimethoprim/Sulfamethoxazole: S <=2/38    -  Ceftriaxone: S <=1 Enterobacter, Citrobacter, and Serratia may develop resistance during prolonged therapy    -  Ciprofloxacin: S <=1    Specimen Source: .Urine    Culture Results:   50,000 - 99,000 CFU/mL Klebsiella pneumoniae    Organism Identification: Klebsiella pneumoniae    Organism: Klebsiella pneumoniae    Method Type: TERESA      Urine Microscopic-Add On (NC) (19 @ 15:52)    Bacteria: Moderate /HPF    Comment - Urine: moderate amorphous sediments present    Epithelial Cells: Few /HPF    Red Blood Cell - Urine: 25-50 /HPF    White Blood Cell - Urine: >50 /HPF      Culture - Blood (19 @ 01:18)    Specimen Source: .Blood    Culture Results:   No growth to date.      Culture - Blood (19 @ 01:18)    -  Trimethoprim/Sulfamethoxazole: S <=2/38    -  Tobramycin: S <=2    -  Piperacillin/Tazobactam: S <=8    -  Meropenem: S <=1    -  Levofloxacin: S <=2    -  Gentamicin: S <=2    -  Imipenem: S <=1    -  Klebsiella pneumoniae: Detec    Gram Stain:   Growth in aerobic bottle: Gram Variable Rods    -  Amikacin: S <=16    -  Ampicillin: R 16 These ampicillin results predict results for amoxicillin    -  Aztreonam: S <=4    -  Ampicillin/Sulbactam: S <=4/2 Enterobacter, Citrobacter, and Serratia may develop resistance during prolonged therapy (3-4 days)    -  Ceftriaxone: S <=1 Enterobacter, Citrobacter, and Serratia may develop resistance during prolonged therapy    -  Cefoxitin: S <=8    -  Cefepime: S <=2    -  Cefazolin: S <=2 Enterobacter, Citrobacter, and Serratia may develop resistance during prolonged therapy (3-4 days)    -  Ertapenem: S <=0.5    -  Ciprofloxacin: S <=1    Specimen Source: .Blood    Organism: Blood Culture PCR    Organism: Klebsiella pneumoniae    Culture Results:   Growth in aerobic bottle: Klebsiella pneumoniae  "Due to technical problems, Proteus sp. will Not be reported as part of  the BCID panel until further notice"  ***Blood Panel PCR results on this specimen are available  approximately 3 hours after the Gram stain result.***  Gram stain, PCR, and/or culture results may not always  correspond due to difference in methodologies.  ************************************************************  This PCR assay was performed using [employer].  The following targets are tested for: Enterococcus,  vancomycin resistant enterococci, Listeria monocytogenes,  coagulase negative staphylococci, S. aureus,  methicillin resistant S. aureus, Streptococcus agalactiae  (Group B), S. pneumoniae, S. pyogenes (Group A),  Acinetobacter baumannii, Enterobacter cloacae, E. coli,  Klebsiella oxytoca, K. pneumoniae, Proteus sp.,  Serratia marcescens, Haemophilus influenzae,  Neisseria meningitidis, Pseudomonas aeruginosa, Candida  albicans, C. glabrata, C krusei, C parapsilosis,  C. tropicalis and the KPC resistance gene.    Organism Identification: Blood Culture PCR  Klebsiella pneumoniae    Method Type: PCR    Method Type: TERESA

## 2019-08-27 NOTE — CONSULT NOTE ADULT - PROBLEM SELECTOR RECOMMENDATION 2
Sepsis 2/2 UTI   UA positive   Bld cx + klebsiella   continue with Zosyn
2/2 klebsiella bacteremia, antibiotics per ID.

## 2019-08-27 NOTE — BEHAVIORAL HEALTH ASSESSMENT NOTE - NSBHCHARTREVIEWIMAGING_PSY_A_CORE FT
< from: CT Abdomen and Pelvis No Cont (08.26.19 @ 11:50) >    IMPRESSION:     Distal esophageal wall thickening, question esophagitis.    < end of copied text >

## 2019-08-27 NOTE — BEHAVIORAL HEALTH ASSESSMENT NOTE - VETERAN

## 2019-08-27 NOTE — PROGRESS NOTE ADULT - ASSESSMENT
1. Vomiting improved  2. Erosive esophagitis  3. No evidence of acute GI bleeding  4. Fecal impaction     Suggestions:    1. Dialysis as per Renal  2. Protonix daily  3. Advance diet as tolerated  4. Monitor H/H  5. Stool softener / Laxative  6. DVT prophylaxis

## 2019-08-27 NOTE — PROGRESS NOTE ADULT - ASSESSMENT
Patient is a 58y old  Male from [facility], with DM (on insulin), partial vision loss, ESRD (on TTS HD via LUE AVF), s/p left BKA and adrenal insufficiency has sent to the ER for  evaluation of fever and chills, during his dialysis session.  He has no cough,  No shortness of breath, No abdominal pain, nausea, vomiting, diarrhea, or dysuria. On admission, he found to have Fever, tachycardia, Leukocytosis and positive Urine analysis.  The CXR shows Lungs are clear. He has started on Zosyn and Vancomycin , and The ID consult requested to assist with further evaluation  and antibiotic management.     # Sepsis ( Fever + tachycardia + leukocytosis )  # UTI - Klebsiella  # High grade Gram Negative Bacteremia - Klebsiella - Most likely source is   # Hematemesis    would recommend:    1. Psychiatry evaluation for capacity sibce refusing All medications despite of feeling very ill, namely persistent N/V, GI bleed and Bacteremia  2. Antiemetic agents  3. Continue  Meropenem and ADD IV Fluconazole since CT abd/pelvis shows Esophagitis   4.  Continue IV protonix    d/w Dr. De La Torre , Nursing staff and Patient     will follow the patient with you Patient is a 58y old  Male from [facility], with DM (on insulin), partial vision loss, ESRD (on TTS HD via LUE AVF), s/p left BKA and adrenal insufficiency has sent to the ER for  evaluation of fever and chills, during his dialysis session.  He has no cough,  No shortness of breath, No abdominal pain, nausea, vomiting, diarrhea, or dysuria. On admission, he found to have Fever, tachycardia, Leukocytosis and positive Urine analysis.  The CXR shows Lungs are clear. He has started on Zosyn and Vancomycin , and The ID consult requested to assist with further evaluation  and antibiotic management.     # Sepsis ( Fever + tachycardia + leukocytosis )  # UTI - Klebsiella  # High grade Gram Negative Bacteremia - Klebsiella 8/23- Most likely source is  - repeat  Bcx NGTD as of 8/24  # Hematemesis  # Esophagitis - on fluconazole 8/27    would recommend:    1. Continue IV Antiemetic agents since refusing oral Meds  2. Continue  Meropenem and IV Fluconazole since Agrees to take them , until 9/10/19  3.  Continue IV protonix    d/w  Nursing staff and Patient     will follow the patient with you

## 2019-08-27 NOTE — CONSULT NOTE ADULT - CONSULT REASON
Coffee ground emesis
ESRD
ESRD, sepsis, debility, goals of care
Positive blood culture
coffee ground emesis

## 2019-08-27 NOTE — BEHAVIORAL HEALTH ASSESSMENT NOTE - NSBHCHARTREVIEWINVESTIGATE_PSY_A_CORE FT
< from: 12 Lead ECG (08.25.19 @ 18:35) >    QTC Calculation(Bezet) 507 ms    P Axis 84 degrees    R Axis 60 degrees    T Axis 67 degrees    Diagnosis Line *** Poor data quality, interpretation may be adversely affected  Sinus tachycardia  Possible Left atrial enlargement  Left ventricular hypertrophy  Abnormal ECG    < end of copied text >

## 2019-08-27 NOTE — BEHAVIORAL HEALTH ASSESSMENT NOTE - SUMMARY
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 Psych consult was requested for capacity eval due to pt's poor cooperation with care (refusing all PO meds as well as many IV meds). On exam, pt denies SI/HI/AVH and denies any plan or intention to refuse care. He expresses an adequate basic understanding of his medical condition, the treatment being offered (IV and PO medications for sepsis, possible esophagitis and management of nausea and vomiting) and the importance of receiving it. On our assessment pt does have capacity to refuse care, yet he insists he is not doing so. Pt does not seem to appreciate that his oppositional behavior is a barrier to his care. Under the circumstances, we recommend continuing efforts by staff to engage with pt and persuade him to accept the care, which he says he does want. Pt's brother/HCP can also be enlisted to help reinforce the team's message. 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 Psych consult was requested for capacity eval due to pt's poor cooperation with care (refusing all PO meds as well as many IV meds). On exam, pt denies SI/HI/AVH and denies any plan or intention to refuse care. He expresses an adequate basic understanding of his medical condition, the treatment being offered (IV and PO medications for sepsis, possible esophagitis and management of nausea and vomiting) and the importance of receiving it. On our assessment pt does have capacity to refuse care, yet he insists he is not doing so. Pt does not seem to appreciate that his oppositional behavior is a barrier to his care. Under the circumstances, we recommend continuing efforts by staff to engage with pt and persuade him to accept the care, which he says he does want. Pt's brother/HCP can also be enlisted to help reinforce the team's message. However, given that pt is not in imminent danger of death in the next 24 hours, it would not be ethically justified to treat him involuntarily if his cooperation continues to be poor.

## 2019-08-27 NOTE — CONSULT NOTE ADULT - PROBLEM SELECTOR RECOMMENDATION 4
Likely Anemia of Chronic disease   c./w epogen
on HD, renal following. Unable to tolerate full dialysis session today due to hypotension.

## 2019-08-27 NOTE — PROGRESS NOTE ADULT - PROBLEM SELECTOR PLAN 10
-patient with repeated non compliance in taking medication  -psych consult requested  -palliative consult requested -patient with repeated non compliance in taking medication  -s/w Dr. Phelan (psych)-will see patient  -palliative consult requested

## 2019-08-27 NOTE — BEHAVIORAL HEALTH ASSESSMENT NOTE - NSBHCONSULTRECOMMENDOTHER_PSY_A_CORE FT
1. Pt DOES demonstrate capacity to refuse non-emergent care and medications at this time, but insists that he is not actually refusing care. Recommend continued efforts to persuade pt to cooperate with care  2. Pt's brother/HCP should be enlisted to help team persuade pt to cooperate, but involuntary treatment is NOT ethically justified at the time of assessment (pt is not in imminent danger of death in the next 24 hours)  3. Psychiatry will continue to follow periodically to encourage pt to cooperate with care  4. Case d/w primary NP Joann Hays and attending Dr. Britt Phelan MD  Director, Consultation-Liaison Psychiatry Service  [id number]

## 2019-08-27 NOTE — CONSULT NOTE ADULT - PROBLEM SELECTOR RECOMMENDATION 9
Patient has multiple episodes of vomiting followed by 1 episode of coffee Ground emesis  Currently patient is afebrile, HD stable, Mild tachycardia   CBC showed leucocytosis 13k, H/H 10.6/33  BMP BUN/cr 41/Cr 8.14  AG 19, HCO2 25  Likely erosive esophagitis/ gastritis Vs stress ulcer   f/u CBC, Lactate, BMP   will start on Protonix GTT   f/u CBC Q6 hrs   will transfuse if HB < 8  start on carafate   GI consult Dr Bergman
w coffeeground emesis, 2/2 gastroparesis, GI following. CT showed esophageal thickening r/o esophagitis. On ppi, zofran, reglan, carafate, IVF. Monitor Hb. Currently npo, advance diet as tolerated.

## 2019-08-27 NOTE — PROGRESS NOTE ADULT - ASSESSMENT
Patient is a 57 y/o Male admitted for sepsis secondary to UTI and gm (-) klebsiella/bacteremia., BP elevated, today Patient is a 57 y/o Male admitted for sepsis secondary to UTI and gm (-) klebsiella/bacteremia., BP elevated, today, uncooperative in taking medications

## 2019-08-27 NOTE — BEHAVIORAL HEALTH ASSESSMENT NOTE - NSBHCHARTREVIEWLAB_PSY_A_CORE FT
08-27    140  |  96  |  72<H>  ----------------------------<  97  3.0<L>   |  30  |  14.00<H>    Ca    8.8      27 Aug 2019 10:58  Phos  4.3     08-25  Mg     2.7     08-25    TPro  8.6<H>  /  Alb  3.4<L>  /  TBili  0.6  /  DBili  x   /  AST  17  /  ALT  16  /  AlkPhos  110  08-25    Complete Blood Count in AM (08.27.19 @ 10:58)    Nucleated RBC: 0 /100 WBCs    WBC Count: 9.42 K/uL    RBC Count: 4.03 M/uL    Hemoglobin: 12.4 g/dL    Hematocrit: 36.6 %    Mean Cell Volume: 90.8 fl    Mean Cell Hemoglobin: 30.8 pg    Mean Cell Hemoglobin Conc: 33.9 gm/dL    Red Cell Distrib Width: 14.9 %    Platelet Count - Automated: 270 K/uL

## 2019-08-27 NOTE — PROGRESS NOTE ADULT - ASSESSMENT
Patient is a 58y Male with ESRD on HD TTS at [facility] via AV[facility]. Presented to dialysis feeling fine, afebrile. Into dialysis had rigors and chills with fever of 103 associated with vomiting. blood cultures were drawn ( which are resulting negative as of now). given vanco and gentamicin. initially refused to come to hospital but agreed later. in ED found with fever, leucocytosis and vomiting. blood cultures  are growing gram negative rods. renal consulted for ESRD     # ESRD TTS.  # VOMMITTING FROM DIABETIC GASTROPARESIS. CONT REGLAN.  # FEVER WITH GRAM NEGATIVE BACTREMIA- ON ABX PER Infectious disease specialist. Has been refusing medicaitons.  # ANENIA OF CKD. STABLE. GIVE AYAN WITH HD  # HTN BP CONTROLLED   # PHOS AT GOAL

## 2019-08-27 NOTE — CONSULT NOTE ADULT - PROBLEM SELECTOR RECOMMENDATION 3
econdary to dm/htn/smoking.  HD as per Nephrology  BMP BUN/cr 41/Cr 8.14  AG 19, HCO2 25  Likely Anion gap acidosis with metabolic alkalosis   f/u Lactate, acetone level   F/u ABG
s/p L BKA, ambulatory w prosthesis/RW. usu independent in ADLs.

## 2019-08-27 NOTE — CONSULT NOTE ADULT - PROBLEM SELECTOR RECOMMENDATION 5
c/w with florinef
Attempted to engage patient in discussion regarding goals of care, lethargic at present, able to answer simple questions. Spoke with patient's brother Georgi by phone who indicated that he is his brother's HCP. He has been informed regarding the patient's noncompliance and has been trying to reach him to discuss with the patient. He expressed that his brother had never expressed any wishes regarding end of life, code status, he will discuss with him. Support provided. Palliative will follow.

## 2019-08-28 DIAGNOSIS — R11.2 NAUSEA WITH VOMITING, UNSPECIFIED: ICD-10-CM

## 2019-08-28 LAB
CULTURE RESULTS: SIGNIFICANT CHANGE UP
GLUCOSE BLDC GLUCOMTR-MCNC: 170 MG/DL — HIGH (ref 70–99)
GLUCOSE BLDC GLUCOMTR-MCNC: 200 MG/DL — HIGH (ref 70–99)
GLUCOSE BLDC GLUCOMTR-MCNC: 206 MG/DL — HIGH (ref 70–99)
GLUCOSE BLDC GLUCOMTR-MCNC: 211 MG/DL — HIGH (ref 70–99)
SPECIMEN SOURCE: SIGNIFICANT CHANGE UP

## 2019-08-28 PROCEDURE — 99497 ADVNCD CARE PLAN 30 MIN: CPT | Mod: 25

## 2019-08-28 PROCEDURE — 99232 SBSQ HOSP IP/OBS MODERATE 35: CPT

## 2019-08-28 RX ORDER — METOPROLOL TARTRATE 50 MG
12.5 TABLET ORAL ONCE
Refills: 0 | Status: COMPLETED | OUTPATIENT
Start: 2019-08-28 | End: 2019-08-28

## 2019-08-28 RX ORDER — POTASSIUM CHLORIDE 20 MEQ
20 PACKET (EA) ORAL ONCE
Refills: 0 | Status: DISCONTINUED | OUTPATIENT
Start: 2019-08-28 | End: 2019-08-28

## 2019-08-28 RX ORDER — POTASSIUM CHLORIDE 20 MEQ
10 PACKET (EA) ORAL EVERY 4 HOURS
Refills: 0 | Status: COMPLETED | OUTPATIENT
Start: 2019-08-28 | End: 2019-08-28

## 2019-08-28 RX ORDER — NIFEDIPINE 30 MG
30 TABLET, EXTENDED RELEASE 24 HR ORAL DAILY
Refills: 0 | Status: DISCONTINUED | OUTPATIENT
Start: 2019-08-28 | End: 2019-08-30

## 2019-08-28 RX ORDER — METOPROLOL TARTRATE 50 MG
2.5 TABLET ORAL EVERY 12 HOURS
Refills: 0 | Status: COMPLETED | OUTPATIENT
Start: 2019-08-28 | End: 2019-08-30

## 2019-08-28 RX ADMIN — SODIUM CHLORIDE 60 MILLILITER(S): 9 INJECTION, SOLUTION INTRAVENOUS at 13:46

## 2019-08-28 RX ADMIN — SIMVASTATIN 40 MILLIGRAM(S): 20 TABLET, FILM COATED ORAL at 21:53

## 2019-08-28 RX ADMIN — Medication 25 MILLIGRAM(S): at 21:53

## 2019-08-28 RX ADMIN — Medication 12.5 MILLIGRAM(S): at 06:56

## 2019-08-28 RX ADMIN — Medication 30 MILLIGRAM(S): at 21:52

## 2019-08-28 RX ADMIN — Medication 2.5 MILLIGRAM(S): at 17:31

## 2019-08-28 RX ADMIN — CHLORHEXIDINE GLUCONATE 1 APPLICATION(S): 213 SOLUTION TOPICAL at 14:19

## 2019-08-28 RX ADMIN — FLUDROCORTISONE ACETATE 0.1 MILLIGRAM(S): 0.1 TABLET ORAL at 06:56

## 2019-08-28 RX ADMIN — PANTOPRAZOLE SODIUM 10 MG/HR: 20 TABLET, DELAYED RELEASE ORAL at 13:47

## 2019-08-28 RX ADMIN — Medication 1 GRAM(S): at 23:02

## 2019-08-28 RX ADMIN — Medication 10 MILLIGRAM(S): at 17:38

## 2019-08-28 RX ADMIN — ONDANSETRON 4 MILLIGRAM(S): 8 TABLET, FILM COATED ORAL at 13:29

## 2019-08-28 RX ADMIN — Medication 1 GRAM(S): at 06:56

## 2019-08-28 NOTE — PROGRESS NOTE ADULT - SUBJECTIVE AND OBJECTIVE BOX
[facility] : Progress Note :: [phone], (office [phone]),   Dr Mosher / Dr Washington / Dr Young / Dr Doll / Dr Varsha TURCIOS / Dr Tello / Dr Garcia / Dr Larry qiu  _____________________________________________________________________________________________    was refusing medications but after his brother called him, has agreed to have meds adminstered    fish (Rash)  liver (Anaphylaxis)  No Known Drug Allergies    Hospital Medications:   MEDICATIONS  (STANDING):  chlorhexidine 2% Cloths 1 Application(s) Topical daily  cyanocobalamin 1000 MICROGram(s) Oral daily  dextrose 5% + sodium chloride 0.9%. 1000 milliLiter(s) (60 mL/Hr) IV Continuous <Continuous>  diphenhydrAMINE 25 milliGRAM(s) Oral at bedtime  epoetin beverley Injectable 4000 Unit(s) IV Push <User Schedule>  fluconAZOLE IVPB 100 milliGRAM(s) IV Intermittent every 24 hours  fludroCORTISONE 0.1 milliGRAM(s) Oral two times a day  folic acid 1 milliGRAM(s) Oral daily  gabapentin 200 milliGRAM(s) Oral daily  insulin lispro (HumaLOG) corrective regimen sliding scale   SubCutaneous three times a day before meals  meropenem  IVPB 1000 milliGRAM(s) IV Intermittent every 24 hours  metoprolol tartrate Injectable 2.5 milliGRAM(s) IV Push every 12 hours  mupirocin 2% Nasal 1 Application(s) Nasal every 12 hours  NIFEdipine XL 30 milliGRAM(s) Oral daily  pantoprazole Infusion 8 mG/Hr (10 mL/Hr) IV Continuous <Continuous>  simvastatin 40 milliGRAM(s) Oral at bedtime  sucralfate suspension 1 Gram(s) Oral four times a day        VITALS:  T(F): 98.8 (19 @ 07:37), Max: 99.7 (19 @ 21:06)  HR: 109 (19 @ 07:37)  BP: 168/94 (19 @ 07:37)  RR: 16 (19 @ 07:37)  SpO2: 99% (19 @ 07:37)  Wt(kg): --      PHYSICAL EXAM:  Constitutional: NAD  HEENT: anicteric sclera, oropharynx clear.  Neck: No JVD  Respiratory: CTAB, no wheezes, rales or rhonchi  Cardiovascular: S1, S2, RRR  Gastrointestinal: BS+, soft, NT/ND  Extremities: No peripheral edema  Neurological: A/O x 3, no focal deficits  Vascular Access: AV access with thrill and bruit    LABS:      140  |  96  |  72<H>  ----------------------------<  97  3.0<L>   |  30  |  14.00<H>    Ca    8.8      27 Aug 2019 10:58      Creatinine Trend: 14.00 <--, 11.20 <--, 8.68 <--, 5.98 <--, 8.14 <--, 3.62 <--                        12.4   9.42  )-----------( 270      ( 27 Aug 2019 10:58 )             36.6     Urine Studies:  Urinalysis Basic - ( 23 Aug 2019 15:52 )    Color: Yellow / Appearance: Slightly Turbid / S.010 / pH:   Gluc:  / Ketone: Negative  / Bili: Negative / Urobili: Negative   Blood:  / Protein: 100 / Nitrite: Negative   Leuk Esterase: Moderate / RBC: 25-50 /HPF / WBC >50 /HPF   Sq Epi:  / Non Sq Epi: Few /HPF / Bacteria: Moderate /HPF        RADIOLOGY & ADDITIONAL STUDIES:

## 2019-08-28 NOTE — DIETITIAN INITIAL EVALUATION ADULT. - PERTINENT LABORATORY DATA
08-27 Na140 mmol/L Glu 97 mg/dL K+ 3.0 mmol/L<L> Cr  14.00 mg/dL<H> BUN 72 mg/dL<H>   08-25 Phos 4.3 mg/dL   08-25 Alb 3.4 g/dL<L>

## 2019-08-28 NOTE — CHART NOTE - NSCHARTNOTEFT_GEN_A_CORE
EVENT: HTN/Tachycardia:  Patient noted to persistently tachycardic and hypertensive throughout most of the day. Patient is not currently on any anti-hypertensive medications.  Patient is asymptomatic. Metoprolol 12.5mg PO ordered x 1 STAT.     OBJECTIVE:  Vital Signs Last 24 Hrs  T(C): 36.5 (28 Aug 2019 05:41), Max: 37.6 (27 Aug 2019 21:06)  T(F): 97.7 (28 Aug 2019 05:41), Max: 99.7 (27 Aug 2019 21:06)  HR: 119 (28 Aug 2019 05:41) (110 - 120)  BP: 171/88 (28 Aug 2019 05:41) (106/84 - 211/112)  BP(mean): --  RR: 14 (28 Aug 2019 05:41) (14 - 20)  SpO2: 97% (28 Aug 2019 05:41) (97% - 100%)    FOCUSED PHYSICAL EXAM: A&OX3. Breathing unlaboured. + Tachycardia. Skin temp and colour WNL. NAD.    LABS:                        12.4   9.42  )-----------( 270      ( 27 Aug 2019 10:58 )             36.6     08-27    140  |  96  |  72<H>  ----------------------------<  97  3.0<L>   |  30  |  14.00<H>    Ca    8.8      27 Aug 2019 10:58        ASSESSMENT:  HPI:  57 y/o male from [facility], with PMHx of DM (on insulin), partial vision loss, ESRD (on TTS HD via [facility]), s/p left BKA and adrenal insufficiency is sent to the ED for fever during his dialysis session today. Patient reports he completed 4 hours of dialysis PTA. He reports his fever started today and is associated with chills. He denies any cough, shortness of breath, chest pain, abdominal pain, nausea, vomiting, diarrhea, dysuria or increased urinary frequency.    ED course: s/p vanc and zosyn x 1. s/p 1L LR bolus x2.    GOC as per [facility] records: Full code. (22 Aug 2019 22:20)      PLAN:   - Metoprolol 12.5 mg PO X 1  - Consider Procardia 30mg XL after correction of current of HR and BP as this was patients PTA med      FOLLOW UP / RESULT:

## 2019-08-28 NOTE — PROGRESS NOTE ADULT - ATTENDING COMMENTS
PATIENT WAS SEEN AND EXAMINED  - S/P PALLIATIVE CARE CONSULT  - S/P PSYCH CONSULT, SINCE PATIENT IS STILL NON COMPLIANT WITH IVF AND IV ANTIBIOTICS IN SPITE OF EXPLAINING. PATIENT IS COUNSELLED AGAIN. PATIENT IS TAKING MEDS WITH DIFFICULT NEGOTIATIONS.  - TRYING TO REACH FAMILY BUT COULD NOT REACH THEM YET. MESSAGE IS LEFT ON VOICE MAIL  - PATIENT IS NON COMPLIANT WITH IVF AND MEDS. PATIENT IS COUNSELLED AT LENGTH. PATIENT IS LETHARGIC, S/P RAPID RESPONSE.     - VOMITING AND NAUSEA, ESOPHAGITIS / GASTRITIS - CLEAR LIQUID DIET, CBC, GI CONSULT, ON DIFLUCAN,  IV REGLAN, ZOFRAN AND PROTONIX. DCD ASA, SQ HEPARIN  - UTI AND BACTEREMIA ( KLEBSIELLA )  ON IV MEROPENEM. DCD VANCOMYCIN. . AWAITING ON FINAL  URINE CXS.  KLEBSIELLA IN BLOOD, ONE BOTTLE.  RPT BLOOD CXS  - ESRD ON HD  - GI AND DVT PROPHYLAXIS   - POOR PROGNOSIS  -  .
PATIENT IS SEEN AND EXAMINED  - OBTAIN PSYCH CONSULT, SINCE PATIENT IS STILL NON COMPLIANT WITH IVF AND IV ANTIBIOTICS IN SPITE OF EXPLAINING TO PATIENT THAT IT WILL BE DETRIMENTAL TO HIS PROGNOSIS. TRYING TO REACH FAMILY BUT COULD NOT REACH THEM YET. MESSAGE IS LEFT ON VOICE MAIL  - PATIENT IS NON COMPLIANT WITH IVF AND MEDS. PATIENT IS COUNSELLED AT LENGTH. PATIENT IS LETHARGIC, S/P RAPID RESPONSE.     - VOMITING AND NAUSEA, ESOPHAGITIS / GASTRITIS - CLEAR LIQUID DIET, CBC, GI CONSULT, ADDED IV REGLAN, ZOFRAN AND PROTONIX. DCD ASA, SQ HEPARIN  - UTI AND BACTEREMIA ( KLEBSIELLA )  ON IV ZOSYN. DCD VANCOMYCIN. . AWAITING ON FINAL  URINE CXS.  KLEBSIELLA IN BLOOD, ONE BOTTLE.  RPT BLOOD CXS  - ESRD ON HD  - GI AND DVT PROPHYLAXIS   - POOR PROGNOSIS  - 
PATIENT WAS SEEN AND EXAMINED  - S/P PSYCH CONSULT, SINCE PATIENT IS STILL NON COMPLIANT WITH IVF AND IV ANTIBIOTICS IN SPITE OF EXPLAINING. PATIENT IS COUNSELLED AGAIN  - TRYING TO REACH FAMILY BUT COULD NOT REACH THEM YET. MESSAGE IS LEFT ON VOICE MAIL  - PATIENT IS NON COMPLIANT WITH IVF AND MEDS. PATIENT IS COUNSELLED AT LENGTH. PATIENT IS LETHARGIC, S/P RAPID RESPONSE.     - VOMITING AND NAUSEA, ESOPHAGITIS / GASTRITIS - CLEAR LIQUID DIET, CBC, GI CONSULT, ADDED IV REGLAN, ZOFRAN AND PROTONIX. DCD ASA, SQ HEPARIN  - UTI AND BACTEREMIA ( KLEBSIELLA )  ON IV MEROPENEM. DCD VANCOMYCIN. . AWAITING ON FINAL  URINE CXS.  KLEBSIELLA IN BLOOD, ONE BOTTLE.  RPT BLOOD CXS  - ESRD ON HD  - GI AND DVT PROPHYLAXIS   - POOR PROGNOSIS  -  .

## 2019-08-28 NOTE — PROGRESS NOTE ADULT - SUBJECTIVE AND OBJECTIVE BOX
OVERNIGHT EVENTS:    Present Symptoms:   Dyspnea:   Nausea/Vomiting:   Anxiety:    Depressed Mood:   Fatigue:   Loss of appetite:   Pain:                   location:   Constipation:  Review of Systems: [All others negative or Unable to obtain due to poor mentation]    MEDICATIONS  (STANDING):  chlorhexidine 2% Cloths 1 Application(s) Topical daily  cyanocobalamin 1000 MICROGram(s) Oral daily  dextrose 5% + sodium chloride 0.9%. 1000 milliLiter(s) (60 mL/Hr) IV Continuous <Continuous>  diphenhydrAMINE 25 milliGRAM(s) Oral at bedtime  fluconAZOLE IVPB 100 milliGRAM(s) IV Intermittent every 24 hours  fludroCORTISONE 0.1 milliGRAM(s) Oral two times a day  folic acid 1 milliGRAM(s) Oral daily  gabapentin 200 milliGRAM(s) Oral daily  insulin lispro (HumaLOG) corrective regimen sliding scale   SubCutaneous three times a day before meals  meropenem  IVPB 1000 milliGRAM(s) IV Intermittent every 24 hours  metoprolol tartrate Injectable 2.5 milliGRAM(s) IV Push every 12 hours  mupirocin 2% Nasal 1 Application(s) Nasal every 12 hours  NIFEdipine XL 30 milliGRAM(s) Oral daily  pantoprazole Infusion 8 mG/Hr (10 mL/Hr) IV Continuous <Continuous>  potassium chloride  10 mEq/100 mL IVPB 10 milliEquivalent(s) IV Intermittent every 4 hours  simvastatin 40 milliGRAM(s) Oral at bedtime  sucralfate suspension 1 Gram(s) Oral four times a day    MEDICATIONS  (PRN):  acetaminophen  Suppository .. 650 milliGRAM(s) Rectal every 6 hours PRN Moderate Pain (4 - 6)  aluminum hydroxide/magnesium hydroxide/simethicone Suspension 30 milliLiter(s) Oral every 4 hours PRN Dyspepsia  metoclopramide Injectable 10 milliGRAM(s) IV Push every 8 hours PRN Nausea and vomiting      PHYSICAL EXAM:  Vital Signs Last 24 Hrs  T(C): 36.6 (28 Aug 2019 14:07), Max: 37.6 (27 Aug 2019 21:06)  T(F): 97.8 (28 Aug 2019 14:07), Max: 99.7 (27 Aug 2019 21:06)  HR: 109 (28 Aug 2019 14:07) (109 - 120)  BP: 178/95 (28 Aug 2019 14:07) (113/73 - 178/95)  BP(mean): --  RR: 16 (28 Aug 2019 14:07) (14 - 17)  SpO2: 99% (28 Aug 2019 14:07) (97% - 99%)     Karnofsky Performance Score/Palliative Performance Status Version2:    %  General:  HEENT: NC, sclera anicteric, neck supple  Lungs: unlabored  CV: normal  tachycardia  GI: soft, NT, ND  :    Musculoskeletal:    Skin: no rashes or lesions  Neuro:    Oral intake:    Diet: NPO     LABS:                          12.4   9.42  )-----------( 270      ( 27 Aug 2019 10:58 )             36.6     08-27    140  |  96  |  72<H>  ----------------------------<  97  3.0<L>   |  30  |  14.00<H>    Ca    8.8      27 Aug 2019 10:58          RADIOLOGY & ADDITIONAL STUDIES:    ADVANCE DIRECTIVES: OVERNIGHT EVENTS: no overnight events. Ongoing nausea/vomiting.     Present Symptoms:   Dyspnea: denies  Nausea/Vomiting: +  Anxiety:  denies  Depressed Mood: denies  Fatigue: denies  Loss of appetite: +  Pain:    epigastric, unable to quantify   Review of Systems: All others negative      MEDICATIONS  (STANDING):  chlorhexidine 2% Cloths 1 Application(s) Topical daily  cyanocobalamin 1000 MICROGram(s) Oral daily  dextrose 5% + sodium chloride 0.9%. 1000 milliLiter(s) (60 mL/Hr) IV Continuous <Continuous>  diphenhydrAMINE 25 milliGRAM(s) Oral at bedtime  fluconAZOLE IVPB 100 milliGRAM(s) IV Intermittent every 24 hours  fludroCORTISONE 0.1 milliGRAM(s) Oral two times a day  folic acid 1 milliGRAM(s) Oral daily  gabapentin 200 milliGRAM(s) Oral daily  insulin lispro (HumaLOG) corrective regimen sliding scale   SubCutaneous three times a day before meals  meropenem  IVPB 1000 milliGRAM(s) IV Intermittent every 24 hours  metoprolol tartrate Injectable 2.5 milliGRAM(s) IV Push every 12 hours  mupirocin 2% Nasal 1 Application(s) Nasal every 12 hours  NIFEdipine XL 30 milliGRAM(s) Oral daily  pantoprazole Infusion 8 mG/Hr (10 mL/Hr) IV Continuous <Continuous>  potassium chloride  10 mEq/100 mL IVPB 10 milliEquivalent(s) IV Intermittent every 4 hours  simvastatin 40 milliGRAM(s) Oral at bedtime  sucralfate suspension 1 Gram(s) Oral four times a day    MEDICATIONS  (PRN):  acetaminophen  Suppository .. 650 milliGRAM(s) Rectal every 6 hours PRN Moderate Pain (4 - 6)  aluminum hydroxide/magnesium hydroxide/simethicone Suspension 30 milliLiter(s) Oral every 4 hours PRN Dyspepsia  metoclopramide Injectable 10 milliGRAM(s) IV Push every 8 hours PRN Nausea and vomiting      PHYSICAL EXAM:  Vital Signs Last 24 Hrs  T(C): 36.6 (28 Aug 2019 14:07), Max: 37.6 (27 Aug 2019 21:06)  T(F): 97.8 (28 Aug 2019 14:07), Max: 99.7 (27 Aug 2019 21:06)  HR: 109 (28 Aug 2019 14:07) (109 - 120)  BP: 178/95 (28 Aug 2019 14:07) (113/73 - 178/95)  BP(mean): --  RR: 16 (28 Aug 2019 14:07) (14 - 17)  SpO2: 99% (28 Aug 2019 14:07) (97% - 99%)     Karnofsky Performance Score/Palliative Performance Status Version2:  50  %    GENERAL: alert, irritable, NAD  HEENT: Atraumatic, oropharynx clear, neck supple  CHEST/LUNG: unlabored  HEART: Regular rate and rhythm    ABDOMEN: Soft, Nontender, Nondistended   MUSCULOSKELETAL:  No  edema, L BKA  NERVOUS SYSTEM:  alert and oriented  SKIN: No rashes or lesions noted  Oral intake: poor, on clears    LABS:                          12.4   9.42  )-----------( 270      ( 27 Aug 2019 10:58 )             36.6     08-27    140  |  96  |  72<H>  ----------------------------<  97  3.0<L>   |  30  |  14.00<H>    Ca    8.8      27 Aug 2019 10:58          RADIOLOGY & ADDITIONAL STUDIES:    ADVANCE DIRECTIVES: HCP

## 2019-08-28 NOTE — PROGRESS NOTE ADULT - ASSESSMENT
Patient is a 58y Male with ESRD on HD TTS at [facility] via [facility]. Presented to dialysis feeling fine, afebrile. Into dialysis had rigors and chills with fever of 103 associated with vomiting. blood cultures were drawn ( which are resulting negative as of now). given vanco and gentamicin. initially refused to come to hospital but agreed later. in ED found with fever, leucocytosis and vomiting. blood cultures  are growing gram negative rods. renal consulted for ESRD     # ESRD TTS.  # VOMMITTING FROM DIABETIC GASTROPARESIS. CONT REGLAN.  # FEVER WITH GRAM NEGATIVE BACTREMIA- ON ABX PER Infectious disease specialist. Has been refusing medicaitons. now agreed to get ABX.  # ANENIA OF CKD. STABLE. D/C procrit  # HTN BP CONTROLLED   # PHOS AT GOAL  # hypokalemia. as with vomitting. give K-RIDER

## 2019-08-28 NOTE — DIETITIAN INITIAL EVALUATION ADULT. - FACTORS AFF FOOD INTAKE
acute on chronic comorbidities; uncooperative; intermittent nausea/vomiting/Mu-ism/ethnic/cultural/personal food preferences

## 2019-08-28 NOTE — PROGRESS NOTE ADULT - ASSESSMENT
Patient is a 58y old  Male from [facility], with DM (on insulin), partial vision loss, ESRD (on TTS HD via [facility]), s/p left BKA and adrenal insufficiency has sent to the ER for  evaluation of fever and chills, during his dialysis session.  He has no cough,  No shortness of breath, No abdominal pain, nausea, vomiting, diarrhea, or dysuria. On admission, he found to have Fever, tachycardia, Leukocytosis and positive Urine analysis.  The CXR shows Lungs are clear. He has started on Zosyn and Vancomycin , and The ID consult requested to assist with further evaluation  and antibiotic management.     # Sepsis ( Fever + tachycardia + leukocytosis )  # UTI - Klebsiella  # High grade Gram Negative Bacteremia - Klebsiella 8/23- Most likely source is  - repeat  Bcx NGTD as of 8/24  # Hematemesis  # Esophagitis - on fluconazole 8/27    would recommend:    1. Continue IV Antiemetic agents since refusing oral Meds  2. Continue  Meropenem and IV Fluconazole since Agrees to take them , until 9/10/19  3.  Continue IV protonix    d/w  Covering ITZ, Dominic and Patient     will follow the patient with you

## 2019-08-28 NOTE — PROGRESS NOTE ADULT - ASSESSMENT
Pt with persistent nausea and vomited clear mucous this p.m. small in amount Pt with persistent nausea and vomited clear mucous this p.m. small in amount.

## 2019-08-28 NOTE — DIETITIAN INITIAL EVALUATION ADULT. - OTHER INFO
Pt from assisted living facility, h/o homeless x 10y with drug abuse; alert, but uncooperative, refusing care/medication often, Code Gray 8/27/19 noted, s/p Psychiatric consult; Pt visited today, whole body including face covered by blanket; Seen by RD 8/24/19 for food/nutrition related issues, per RD: pt reports adequate intake PTA and in-house, reports wt loss but amount not available, some chewing difficulty but would like consistency to stay the same, Allergy to liver and fish. NPO/clear liquid diet x 3 to 4 d, Discussed with RN Pt from assisted living facility, h/o homeless x 10y with drug abuse; alert, but uncooperative, refusing care/medication often, Code Gray 8/27/19 noted, s/p Psychiatric consult; Pt visited today, whole body including face covered by blanket; Seen by RD 8/23/19 for food/nutrition related issues with food choices forwarded to Dietary per RD: pt reports adequate intake PTA and in-house, reports wt loss but amount not available, some chewing difficulty but would like consistency to stay the same, Allergy to liver and fish. NPO/clear liquid diet x 3 to 4 d, Discussed with RN

## 2019-08-28 NOTE — DIETITIAN INITIAL EVALUATION ADULT. - NS FNS WEIGHT USED FOR CALC
adjusted/Ht=5' 2'    Adjusted IBW for left BHH=695 lb   Admission nf=709 lb   BMI=21.1   Current yj=388 lb 8/24/19

## 2019-08-28 NOTE — PROGRESS NOTE ADULT - SUBJECTIVE AND OBJECTIVE BOX
Patient is seen and examined at the bed side, is afebrile today and agrees to take Reglan and Antibiotic but no oral Meds. The WBC count is normal.         REVIEW OF SYSTEMS: All other review systems are negative         ALLERGIES:  fish (Rash)  liver (Anaphylaxis)  No Known Drug Allergies      Vital Signs Last 24 Hrs  T(C): 36.6 (28 Aug 2019 14:07), Max: 37.6 (27 Aug 2019 21:06)  T(F): 97.8 (28 Aug 2019 14:07), Max: 99.7 (27 Aug 2019 21:06)  HR: 108 (28 Aug 2019 17:05) (108 - 120)  BP: 167/90 (28 Aug 2019 17:05) (113/73 - 178/95)  BP(mean): --  RR: 16 (28 Aug 2019 14:07) (14 - 17)  SpO2: 99% (28 Aug 2019 14:07) (97% - 99%)      PHYSICAL EXAM:  GENERAL: Mild distress due to vomiting   CHEST/LUNG:  Air entry bilaterally  HEART: s1 and s2 present  ABDOMEN:  Nontender and  Nondistended  EXTREMITIES: Left BKA  CNS: Awake and Alert        LABS: no new Labs                        12.4   9.42  )-----------( 270      ( 27 Aug 2019 10:58 )             36.6                           12.7   8.62  )-----------( 224      ( 26 Aug 2019 15:37 )             38.9                           12.8   13.17 )-----------( 203      ( 24 Aug 2019 19:11 )             39.4           140  |  96  |  72<H>  ----------------------------<  97  3.0<L>   |  30  |  14.00<H>    Ca    8.8      27 Aug 2019 10:58      TPro  8.6<H>  /  Alb  3.4<L>  /  TBili  0.6  /  DBili  x   /  AST  17  /  ALT  16  /  AlkPhos  110      138  |  95<L>  |  60<H>  ----------------------------<  188<H>  3.2<L>   |  24  |  11.20<H>    Ca    9.1      26 Aug 2019 15:38  Phos  4.3     08-25  Mg     2.7     08-25    TPro  8.6<H>  /  Alb  3.4<L>  /  TBili  0.6  /  DBili  x   /  AST  17  /  ALT  16  /  AlkPhos  110  08-25        CAPILLARY BLOOD GLUCOSE  POCT Blood Glucose.: 78 mg/dL (23 Aug 2019 11:30)  POCT Blood Glucose.: 88 mg/dL (23 Aug 2019 07:58)  POCT Blood Glucose.: 125 mg/dL (22 Aug 2019 23:34)        Urinalysis Basic - ( 23 Aug 2019 15:52 )  Color: Yellow / Appearance: Slightly Turbid / S.010 / pH: x  Gluc: x / Ketone: Negative  / Bili: Negative / Urobili: Negative   Blood: x / Protein: 100 / Nitrite: Negative   Leuk Esterase: Moderate / RBC: 25-50 /HPF / WBC >50 /HPF   Sq Epi: x / Non Sq Epi: Few /HPF / Bacteria: Moderate /HPF        MEDICATIONS  (STANDING):  chlorhexidine 2% Cloths 1 Application(s) Topical daily  cyanocobalamin 1000 MICROGram(s) Oral daily  dextrose 5% + sodium chloride 0.9%. 1000 milliLiter(s) (60 mL/Hr) IV Continuous <Continuous>  diphenhydrAMINE 25 milliGRAM(s) Oral at bedtime  fluconAZOLE IVPB 100 milliGRAM(s) IV Intermittent every 24 hours  fludroCORTISONE 0.1 milliGRAM(s) Oral two times a day  folic acid 1 milliGRAM(s) Oral daily  gabapentin 200 milliGRAM(s) Oral daily  insulin lispro (HumaLOG) corrective regimen sliding scale   SubCutaneous three times a day before meals  meropenem  IVPB 1000 milliGRAM(s) IV Intermittent every 24 hours  metoprolol tartrate Injectable 2.5 milliGRAM(s) IV Push every 12 hours  mupirocin 2% Nasal 1 Application(s) Nasal every 12 hours  NIFEdipine XL 30 milliGRAM(s) Oral daily  pantoprazole Infusion 8 mG/Hr (10 mL/Hr) IV Continuous <Continuous>  potassium chloride  10 mEq/100 mL IVPB 10 milliEquivalent(s) IV Intermittent every 4 hours  simvastatin 40 milliGRAM(s) Oral at bedtime  sucralfate suspension 1 Gram(s) Oral four times a day    MEDICATIONS  (PRN):  acetaminophen  Suppository .. 650 milliGRAM(s) Rectal every 6 hours PRN Moderate Pain (4 - 6)  aluminum hydroxide/magnesium hydroxide/simethicone Suspension 30 milliLiter(s) Oral every 4 hours PRN Dyspepsia  metoclopramide Injectable 10 milliGRAM(s) IV Push every 8 hours PRN Nausea and vomiting      RADIOLOGY & ADDITIONAL TESTS:    19 : CT Abdomen and Pelvis No Cont (19 @ 11:50) Distal esophageal wall thickening, question esophagitis.      19 : Xray Chest 1 View-PORTABLE IMMEDIATE (19 @ 18:27) The cardiac silhouette is within normal limits. The lungs are clear. No pleural abnormality.            MICROBIOLOGY DATA:      Culture - Blood (19 @ 23:06)    Specimen Source: .Blood    Culture Results:   No growth to date.        Culture - Blood (19 @ 23:06)    Specimen Source: .Blood    Culture Results:   No growth to date.      Culture - Urine (19 @ 02:13)    -  Amikacin: S <=16    -  Ampicillin: R >16 These ampicillin results predict results for amoxicillin    -  Ampicillin/Sulbactam: S <=8/4 Enterobacter, Citrobacter, and Serratia may develop resistance during prolonged therapy (3-4 days)    -  Aztreonam: S <=4    -  Cefazolin: S <=8 ([id number]) For uncomplicated UTI with K. pneumoniae, E. coli, or P. mirablis: TERESA <=16 is sensitive and TERESA >=32 is resistant. This also predicts results for oral agents cefaclor, cefdinir, cefpodoxime, cefprozil, cefuroxime axetil, cephalexin and locarbef for uncomplicated UTI. Note that some isolates may be susceptible to these agents while testing resistant to cefazolin.    -  Cefepime: S <=4    -  Cefoxitin: S <=8    -  Gentamicin: S <=4    -  Imipenem: S <=1    -  Levofloxacin: S <=2    -  Meropenem: S <=1    -  Nitrofurantoin: I 64 Should not be used to treat pyelonephritis    -  Piperacillin/Tazobactam: S <=16    -  Tigecycline: S <=2    -  Tobramycin: S <=4    -  Trimethoprim/Sulfamethoxazole: S <=2/38    -  Ceftriaxone: S <=1 Enterobacter, Citrobacter, and Serratia may develop resistance during prolonged therapy    -  Ciprofloxacin: S <=1    Specimen Source: .Urine    Culture Results:   50,000 - 99,000 CFU/mL Klebsiella pneumoniae    Organism Identification: Klebsiella pneumoniae    Organism: Klebsiella pneumoniae    Method Type: TERESA      Urine Microscopic-Add On (NC) (19 @ 15:52)    Bacteria: Moderate /HPF    Comment - Urine: moderate amorphous sediments present    Epithelial Cells: Few /HPF    Red Blood Cell - Urine: 25-50 /HPF    White Blood Cell - Urine: >50 /HPF      Culture - Blood (19 @ 01:18)    Specimen Source: .Blood    Culture Results:   No growth to date.      Culture - Blood (19 @ 01:18)    -  Trimethoprim/Sulfamethoxazole: S <=2/38    -  Tobramycin: S <=2    -  Piperacillin/Tazobactam: S <=8    -  Meropenem: S <=1    -  Levofloxacin: S <=2    -  Gentamicin: S <=2    -  Imipenem: S <=1    -  Klebsiella pneumoniae: Detec    Gram Stain:   Growth in aerobic bottle: Gram Variable Rods    -  Amikacin: S <=16    -  Ampicillin: R 16 These ampicillin results predict results for amoxicillin    -  Aztreonam: S <=4    -  Ampicillin/Sulbactam: S <=4/2 Enterobacter, Citrobacter, and Serratia may develop resistance during prolonged therapy (3-4 days)    -  Ceftriaxone: S <=1 Enterobacter, Citrobacter, and Serratia may develop resistance during prolonged therapy    -  Cefoxitin: S <=8    -  Cefepime: S <=2    -  Cefazolin: S <=2 Enterobacter, Citrobacter, and Serratia may develop resistance during prolonged therapy (3-4 days)    -  Ertapenem: S <=0.5    -  Ciprofloxacin: S <=1    Specimen Source: .Blood    Organism: Blood Culture PCR    Organism: Klebsiella pneumoniae    Culture Results:   Growth in aerobic bottle: Klebsiella pneumoniae  "Due to technical problems, Proteus sp. will Not be reported as part of  the BCID panel until further notice"  ***Blood Panel PCR results on this specimen are available  approximately 3 hours after the Gram stain result.***  Gram stain, PCR, and/or culture results may not always  correspond due to difference in methodologies.  ************************************************************  This PCR assay was performed using [employer].  The following targets are tested for: Enterococcus,  vancomycin resistant enterococci, Listeria monocytogenes,  coagulase negative staphylococci, S. aureus,  methicillin resistant S. aureus, Streptococcus agalactiae  (Group B), S. pneumoniae, S. pyogenes (Group A),  Acinetobacter baumannii, Enterobacter cloacae, E. coli,  Klebsiella oxytoca, K. pneumoniae, Proteus sp.,  Serratia marcescens, Haemophilus influenzae,  Neisseria meningitidis, Pseudomonas aeruginosa, Candida  albicans, C. glabrata, C krusei, C parapsilosis,  C. tropicalis and the KPC resistance gene.    Organism Identification: Blood Culture PCR  Klebsiella pneumoniae    Method Type: PCR    Method Type: TERESA Patient is seen and examined at the bed side, is afebrile. He still has nausea and vomiting and still refusing  oral Meds.         REVIEW OF SYSTEMS: All other review systems are negative         ALLERGIES:  fish (Rash)  liver (Anaphylaxis)  No Known Drug Allergies      Vital Signs Last 24 Hrs  T(C): 36.6 (28 Aug 2019 14:07), Max: 37.6 (27 Aug 2019 21:06)  T(F): 97.8 (28 Aug 2019 14:07), Max: 99.7 (27 Aug 2019 21:06)  HR: 108 (28 Aug 2019 17:05) (108 - 120)  BP: 167/90 (28 Aug 2019 17:05) (113/73 - 178/95)  BP(mean): --  RR: 16 (28 Aug 2019 14:07) (14 - 17)  SpO2: 99% (28 Aug 2019 14:07) (97% - 99%)      PHYSICAL EXAM:  GENERAL: Appears Ill  CHEST/LUNG:  Air entry bilaterally  HEART: s1 and s2 present  ABDOMEN:  Nontender and  Nondistended  EXTREMITIES: Left BKA  CNS: Awake and Alert        LABS: no new Labs                        12.4   9.42  )-----------( 270      ( 27 Aug 2019 10:58 )             36.6                           12.7   8.62  )-----------( 224      ( 26 Aug 2019 15:37 )             38.9                           12.8   13.17 )-----------( 203      ( 24 Aug 2019 19:11 )             39.4           140  |  96  |  72<H>  ----------------------------<  97  3.0<L>   |  30  |  14.00<H>    Ca    8.8      27 Aug 2019 10:58      TPro  8.6<H>  /  Alb  3.4<L>  /  TBili  0.6  /  DBili  x   /  AST  17  /  ALT  16  /  AlkPhos  110      138  |  95<L>  |  60<H>  ----------------------------<  188<H>  3.2<L>   |  24  |  11.20<H>    Ca    9.1      26 Aug 2019 15:38  Phos  4.3     08-25  Mg     2.7     -25    TPro  8.6<H>  /  Alb  3.4<L>  /  TBili  0.6  /  DBili  x   /  AST  17  /  ALT  16  /  AlkPhos  110  -        CAPILLARY BLOOD GLUCOSE  POCT Blood Glucose.: 78 mg/dL (23 Aug 2019 11:30)  POCT Blood Glucose.: 88 mg/dL (23 Aug 2019 07:58)  POCT Blood Glucose.: 125 mg/dL (22 Aug 2019 23:34)        Urinalysis Basic - ( 23 Aug 2019 15:52 )  Color: Yellow / Appearance: Slightly Turbid / S.010 / pH: x  Gluc: x / Ketone: Negative  / Bili: Negative / Urobili: Negative   Blood: x / Protein: 100 / Nitrite: Negative   Leuk Esterase: Moderate / RBC: 25-50 /HPF / WBC >50 /HPF   Sq Epi: x / Non Sq Epi: Few /HPF / Bacteria: Moderate /HPF        MEDICATIONS  (STANDING):  chlorhexidine 2% Cloths 1 Application(s) Topical daily  cyanocobalamin 1000 MICROGram(s) Oral daily  dextrose 5% + sodium chloride 0.9%. 1000 milliLiter(s) (60 mL/Hr) IV Continuous <Continuous>  diphenhydrAMINE 25 milliGRAM(s) Oral at bedtime  fluconAZOLE IVPB 100 milliGRAM(s) IV Intermittent every 24 hours  fludroCORTISONE 0.1 milliGRAM(s) Oral two times a day  folic acid 1 milliGRAM(s) Oral daily  gabapentin 200 milliGRAM(s) Oral daily  insulin lispro (HumaLOG) corrective regimen sliding scale   SubCutaneous three times a day before meals  meropenem  IVPB 1000 milliGRAM(s) IV Intermittent every 24 hours  metoprolol tartrate Injectable 2.5 milliGRAM(s) IV Push every 12 hours  mupirocin 2% Nasal 1 Application(s) Nasal every 12 hours  NIFEdipine XL 30 milliGRAM(s) Oral daily  pantoprazole Infusion 8 mG/Hr (10 mL/Hr) IV Continuous <Continuous>  potassium chloride  10 mEq/100 mL IVPB 10 milliEquivalent(s) IV Intermittent every 4 hours  simvastatin 40 milliGRAM(s) Oral at bedtime  sucralfate suspension 1 Gram(s) Oral four times a day    MEDICATIONS  (PRN):  acetaminophen  Suppository .. 650 milliGRAM(s) Rectal every 6 hours PRN Moderate Pain (4 - 6)  aluminum hydroxide/magnesium hydroxide/simethicone Suspension 30 milliLiter(s) Oral every 4 hours PRN Dyspepsia  metoclopramide Injectable 10 milliGRAM(s) IV Push every 8 hours PRN Nausea and vomiting      RADIOLOGY & ADDITIONAL TESTS:    19 : CT Abdomen and Pelvis No Cont (19 @ 11:50) Distal esophageal wall thickening, question esophagitis.      19 : Xray Chest 1 View-PORTABLE IMMEDIATE (19 @ 18:27) The cardiac silhouette is within normal limits. The lungs are clear. No pleural abnormality.            MICROBIOLOGY DATA:      Culture - Blood (19 @ 23:06)    Specimen Source: .Blood    Culture Results:   No growth to date.        Culture - Blood (19 @ 23:06)    Specimen Source: .Blood    Culture Results:   No growth to date.      Culture - Urine (19 @ 02:13)    -  Amikacin: S <=16    -  Ampicillin: R >16 These ampicillin results predict results for amoxicillin    -  Ampicillin/Sulbactam: S <=8/4 Enterobacter, Citrobacter, and Serratia may develop resistance during prolonged therapy (3-4 days)    -  Aztreonam: S <=4    -  Cefazolin: S <=8 ([email or web address]) For uncomplicated UTI with K. pneumoniae, E. coli, or P. mirablis: TERESA <=16 is sensitive and TERESA >=32 is resistant. This also predicts results for oral agents cefaclor, cefdinir, cefpodoxime, cefprozil, cefuroxime axetil, cephalexin and locarbef for uncomplicated UTI. Note that some isolates may be susceptible to these agents while testing resistant to cefazolin.    -  Cefepime: S <=4    -  Cefoxitin: S <=8    -  Gentamicin: S <=4    -  Imipenem: S <=1    -  Levofloxacin: S <=2    -  Meropenem: S <=1    -  Nitrofurantoin: I 64 Should not be used to treat pyelonephritis    -  Piperacillin/Tazobactam: S <=16    -  Tigecycline: S <=2    -  Tobramycin: S <=4    -  Trimethoprim/Sulfamethoxazole: S <=2/38    -  Ceftriaxone: S <=1 Enterobacter, Citrobacter, and Serratia may develop resistance during prolonged therapy    -  Ciprofloxacin: S <=1    Specimen Source: .Urine    Culture Results:   50,000 - 99,000 CFU/mL Klebsiella pneumoniae    Organism Identification: Klebsiella pneumoniae    Organism: Klebsiella pneumoniae    Method Type: TERESA      Urine Microscopic-Add On (NC) (19 @ 15:52)    Bacteria: Moderate /HPF    Comment - Urine: moderate amorphous sediments present    Epithelial Cells: Few /HPF    Red Blood Cell - Urine: 25-50 /HPF    White Blood Cell - Urine: >50 /HPF      Culture - Blood (19 @ 01:18)    Specimen Source: .Blood    Culture Results:   No growth to date.      Culture - Blood (19 @ 01:18)    -  Trimethoprim/Sulfamethoxazole: S <=2/38    -  Tobramycin: S <=2    -  Piperacillin/Tazobactam: S <=8    -  Meropenem: S <=1    -  Levofloxacin: S <=2    -  Gentamicin: S <=2    -  Imipenem: S <=1    -  Klebsiella pneumoniae: Detec    Gram Stain:   Growth in aerobic bottle: Gram Variable Rods    -  Amikacin: S <=16    -  Ampicillin: R 16 These ampicillin results predict results for amoxicillin    -  Aztreonam: S <=4    -  Ampicillin/Sulbactam: S <=4/2 Enterobacter, Citrobacter, and Serratia may develop resistance during prolonged therapy (3-4 days)    -  Ceftriaxone: S <=1 Enterobacter, Citrobacter, and Serratia may develop resistance during prolonged therapy    -  Cefoxitin: S <=8    -  Cefepime: S <=2    -  Cefazolin: S <=2 Enterobacter, Citrobacter, and Serratia may develop resistance during prolonged therapy (3-4 days)    -  Ertapenem: S <=0.5    -  Ciprofloxacin: S <=1    Specimen Source: .Blood    Organism: Blood Culture PCR    Organism: Klebsiella pneumoniae    Culture Results:   Growth in aerobic bottle: Klebsiella pneumoniae  "Due to technical problems, Proteus sp. will Not be reported as part of  the BCID panel until further notice"  ***Blood Panel PCR results on this specimen are available  approximately 3 hours after the Gram stain result.***  Gram stain, PCR, and/or culture results may not always  correspond due to difference in methodologies.  ************************************************************  This PCR assay was performed using [employer].  The following targets are tested for: Enterococcus,  vancomycin resistant enterococci, Listeria monocytogenes,  coagulase negative staphylococci, S. aureus,  methicillin resistant S. aureus, Streptococcus agalactiae  (Group B), S. pneumoniae, S. pyogenes (Group A),  Acinetobacter baumannii, Enterobacter cloacae, E. coli,  Klebsiella oxytoca, K. pneumoniae, Proteus sp.,  Serratia marcescens, Haemophilus influenzae,  Neisseria meningitidis, Pseudomonas aeruginosa, Candida  albicans, C. glabrata, C krusei, C parapsilosis,  C. tropicalis and the KPC resistance gene.    Organism Identification: Blood Culture PCR  Klebsiella pneumoniae    Method Type: PCR    Method Type: TERESA

## 2019-08-28 NOTE — DIETITIAN INITIAL EVALUATION ADULT. - PERTINENT MEDS FT
MEDICATIONS  (STANDING):  chlorhexidine 2% Cloths 1 Application(s) Topical daily  cyanocobalamin 1000 MICROGram(s) Oral daily  dextrose 5% + sodium chloride 0.9%. 1000 milliLiter(s) (60 mL/Hr) IV Continuous <Continuous>  diphenhydrAMINE 25 milliGRAM(s) Oral at bedtime  epoetin beverley Injectable 4000 Unit(s) IV Push <User Schedule>  fluconAZOLE IVPB 100 milliGRAM(s) IV Intermittent every 24 hours  fludroCORTISONE 0.1 milliGRAM(s) Oral two times a day  folic acid 1 milliGRAM(s) Oral daily  gabapentin 200 milliGRAM(s) Oral daily  insulin lispro (HumaLOG) corrective regimen sliding scale   SubCutaneous three times a day before meals  meropenem  IVPB 1000 milliGRAM(s) IV Intermittent every 24 hours  mupirocin 2% Nasal 1 Application(s) Nasal every 12 hours  NIFEdipine XL 30 milliGRAM(s) Oral daily  pantoprazole Infusion 8 mG/Hr (10 mL/Hr) IV Continuous <Continuous>  simvastatin 40 milliGRAM(s) Oral at bedtime  sucralfate suspension 1 Gram(s) Oral four times a day    MEDICATIONS  (PRN):  acetaminophen  Suppository .. 650 milliGRAM(s) Rectal every 6 hours PRN Moderate Pain (4 - 6)  aluminum hydroxide/magnesium hydroxide/simethicone Suspension 30 milliLiter(s) Oral every 4 hours PRN Dyspepsia  metoclopramide Injectable 10 milliGRAM(s) IV Push every 8 hours PRN Nausea and vomiting  ondansetron Injectable 4 milliGRAM(s) IV Push every 8 hours PRN Nausea and/or Vomiting

## 2019-08-28 NOTE — DIETITIAN INITIAL EVALUATION ADULT. - DIET TYPE
diet at assisted living facility PTA: not available Advance diet or consider nutritional supplement if Clear liquid diet prolonged as medically feasible

## 2019-08-28 NOTE — PROGRESS NOTE ADULT - SUBJECTIVE AND OBJECTIVE BOX
Patient is a 58y old  Male who presents with a chief complaint of sepsis (27 Aug 2019 21:03)      INTERVAL HPI/OVERNIGHT EVENTS: AxOx3, denies chest pain, nausea or vomiting, diarrhea  T(C): 37.1 (08-28-19 @ 07:37), Max: 37.6 (08-27-19 @ 21:06)  HR: 109 (08-28-19 @ 07:37) (109 - 120)  BP: 168/94 (08-28-19 @ 07:37) (106/84 - 196/82)  RR: 16 (08-28-19 @ 07:37) (14 - 19)  SpO2: 99% (08-28-19 @ 07:37) (97% - 100%)  Wt(kg): --  I&O's Summary      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  Diabetic neuropathy  Diabetes mellitus  Hyperparathyroidism  Spinal stenosis of lumbosacral region  Peripheral vascular disease  HLD (hyperlipidemia)  Coronary artery disease  Glaucoma  Anemia  CKD (chronic kidney disease)  Adrenal insufficiency  Hypertension  History of left cataract extraction  History of right cataract extraction  Below knee amputation status, left          LABS:                        12.4   9.42  )-----------( 270      ( 27 Aug 2019 10:58 )             36.6     08-27    140  |  96  |  72<H>  ----------------------------<  97  3.0<L>   |  30  |  14.00<H>    Ca    8.8      27 Aug 2019 10:58          CAPILLARY BLOOD GLUCOSE      POCT Blood Glucose.: 211 mg/dL (28 Aug 2019 07:25)  POCT Blood Glucose.: 120 mg/dL (27 Aug 2019 22:22)            MEDICATIONS  (STANDING):  chlorhexidine 2% Cloths 1 Application(s) Topical daily  cyanocobalamin 1000 MICROGram(s) Oral daily  dextrose 5% + sodium chloride 0.9%. 1000 milliLiter(s) (60 mL/Hr) IV Continuous <Continuous>  diphenhydrAMINE 25 milliGRAM(s) Oral at bedtime  epoetin beverley Injectable 4000 Unit(s) IV Push <User Schedule>  fluconAZOLE IVPB 100 milliGRAM(s) IV Intermittent every 24 hours  fludroCORTISONE 0.1 milliGRAM(s) Oral two times a day  folic acid 1 milliGRAM(s) Oral daily  gabapentin 200 milliGRAM(s) Oral daily  insulin lispro (HumaLOG) corrective regimen sliding scale   SubCutaneous three times a day before meals  meropenem  IVPB 1000 milliGRAM(s) IV Intermittent every 24 hours  mupirocin 2% Nasal 1 Application(s) Nasal every 12 hours  NIFEdipine XL 30 milliGRAM(s) Oral daily  pantoprazole Infusion 8 mG/Hr (10 mL/Hr) IV Continuous <Continuous>  simvastatin 40 milliGRAM(s) Oral at bedtime  sucralfate suspension 1 Gram(s) Oral four times a day    MEDICATIONS  (PRN):  acetaminophen  Suppository .. 650 milliGRAM(s) Rectal every 6 hours PRN Moderate Pain (4 - 6)  aluminum hydroxide/magnesium hydroxide/simethicone Suspension 30 milliLiter(s) Oral every 4 hours PRN Dyspepsia  metoclopramide Injectable 10 milliGRAM(s) IV Push every 8 hours PRN Nausea and vomiting  ondansetron Injectable 4 milliGRAM(s) IV Push every 8 hours PRN Nausea and/or Vomiting      REVIEW OF SYSTEMS:  CONSTITUTIONAL: No fever, weight loss, or fatigue  EYES: No eye pain, visual disturbances, or discharge  ENMT:  No difficulty hearing, tinnitus, vertigo; No sinus or throat pain  NECK: No pain or stiffness  RESPIRATORY: No cough, wheezing, chills or hemoptysis; No shortness of breath  CARDIOVASCULAR: No chest pain, palpitations, dizziness, or leg swelling  GASTROINTESTINAL: No abdominal or epigastric pain. No nausea, vomiting, or hematemesis; No diarrhea or constipation. No melena or hematochezia.  GENITOURINARY: No dysuria, frequency, hematuria, or incontinence  NEUROLOGICAL: No headaches, memory loss, loss of strength, numbness, or tremors  SKIN: No itching, burning, rashes, or lesions   LYMPH NODES: No enlarged glands  ENDOCRINE: No heat or cold intolerance; No hair loss  MUSCULOSKELETAL: No joint pain or swelling; No muscle, back, or extremity pain  PSYCHIATRIC: No depression, anxiety, mood swings, or difficulty sleeping  HEME/LYMPH: No easy bruising, or bleeding gums  ALLERGY AND IMMUNOLOGIC: No hives or eczema    RADIOLOGY & ADDITIONAL TESTS:    Imaging Personally Reviewed:  [x ] YES  [ ] NO    Consultant(s) Notes Reviewed:  [x ] YES  [ ] NO    PHYSICAL EXAM:  GENERAL: NAD, well-groomed, well-developed  HEAD:  Atraumatic, Normocephalic  EYES:  conjunctiva and sclera clear  ENMT: No tonsillar erythema, exudates, or enlargement; Moist mucous membranes, No lesions  NECK: Supple, No JVD, Normal thyroid  NERVOUS SYSTEM:  Alert & Oriented X3, Good concentration  CHEST/LUNG: Clear to percussion bilaterally; No rales, rhonchi, wheezing, or rubs  HEART: Regular rate and rhythm; No murmurs, rubs, or gallops  ABDOMEN: Soft, Nontender, Nondistended; Bowel sounds present  EXTREMITIES:  2+ Peripheral Pulses, No clubbing, cyanosis, or edema, Lt BKA  LYMPH: No lymphadenopathy noted  SKIN: No rashes or lesions    Care Collaborated Discussed with Consultants/Other Providers [ x] YES  [ ] NO Patient is a 57 y/o  Male who presents with a chief complaint of sepsis.      INTERVAL HPI/OVERNIGHT EVENTS: AxOx3, denies chest pain, nausea and vomiting persists, no diarrhea, BP elevated last night, refusing oral medications,   T(C): 37.1 (08-28-19 @ 07:37), Max: 37.6 (08-27-19 @ 21:06)  HR: 109 (08-28-19 @ 07:37) (109 - 120)  BP: 168/94 (08-28-19 @ 07:37) (106/84 - 196/82)  RR: 16 (08-28-19 @ 07:37) (14 - 19)  SpO2: 99% (08-28-19 @ 07:37) (97% - 100%)  Wt(kg): --  I&O's Summary      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  Diabetic neuropathy  Diabetes mellitus  Hyperparathyroidism  Spinal stenosis of lumbosacral region  Peripheral vascular disease  HLD (hyperlipidemia)  Coronary artery disease  Glaucoma  Anemia  CKD (chronic kidney disease)  Adrenal insufficiency  Hypertension  History of left cataract extraction  History of right cataract extraction  Below knee amputation status, left          LABS:                        12.4   9.42  )-----------( 270      ( 27 Aug 2019 10:58 )             36.6     08-27    140  |  96  |  72<H>  ----------------------------<  97  3.0<L>   |  30  |  14.00<H>    Ca    8.8      27 Aug 2019 10:58          CAPILLARY BLOOD GLUCOSE      POCT Blood Glucose.: 211 mg/dL (28 Aug 2019 07:25)  POCT Blood Glucose.: 120 mg/dL (27 Aug 2019 22:22)            MEDICATIONS  (STANDING):  chlorhexidine 2% Cloths 1 Application(s) Topical daily  cyanocobalamin 1000 MICROGram(s) Oral daily  dextrose 5% + sodium chloride 0.9%. 1000 milliLiter(s) (60 mL/Hr) IV Continuous <Continuous>  diphenhydrAMINE 25 milliGRAM(s) Oral at bedtime  epoetin beverley Injectable 4000 Unit(s) IV Push <User Schedule>  fluconAZOLE IVPB 100 milliGRAM(s) IV Intermittent every 24 hours  fludroCORTISONE 0.1 milliGRAM(s) Oral two times a day  folic acid 1 milliGRAM(s) Oral daily  gabapentin 200 milliGRAM(s) Oral daily  insulin lispro (HumaLOG) corrective regimen sliding scale   SubCutaneous three times a day before meals  meropenem  IVPB 1000 milliGRAM(s) IV Intermittent every 24 hours  mupirocin 2% Nasal 1 Application(s) Nasal every 12 hours  NIFEdipine XL 30 milliGRAM(s) Oral daily  pantoprazole Infusion 8 mG/Hr (10 mL/Hr) IV Continuous <Continuous>  simvastatin 40 milliGRAM(s) Oral at bedtime  sucralfate suspension 1 Gram(s) Oral four times a day    MEDICATIONS  (PRN):  acetaminophen  Suppository .. 650 milliGRAM(s) Rectal every 6 hours PRN Moderate Pain (4 - 6)  aluminum hydroxide/magnesium hydroxide/simethicone Suspension 30 milliLiter(s) Oral every 4 hours PRN Dyspepsia  metoclopramide Injectable 10 milliGRAM(s) IV Push every 8 hours PRN Nausea and vomiting  ondansetron Injectable 4 milliGRAM(s) IV Push every 8 hours PRN Nausea and/or Vomiting      REVIEW OF SYSTEMS:  CONSTITUTIONAL: No fever, weight loss, or fatigue  EYES: No eye pain, visual disturbances, or discharge  ENMT:  No difficulty hearing, tinnitus, vertigo; No sinus or throat pain  NECK: No pain or stiffness  RESPIRATORY: No cough, wheezing, chills or hemoptysis; No shortness of breath  CARDIOVASCULAR: No chest pain, palpitations, dizziness, or leg swelling  GASTROINTESTINAL: No abdominal or epigastric pain. reports nausea, vomiting, or hematemesis; No diarrhea or constipation. No melena or hematochezia.  GENITOURINARY: No dysuria, frequency, hematuria, or incontinence  NEUROLOGICAL: No headaches, memory loss, loss of strength, numbness, or tremors  SKIN: No itching, burning, rashes, or lesions   LYMPH NODES: No enlarged glands  ENDOCRINE: No heat or cold intolerance; No hair loss  MUSCULOSKELETAL: No joint pain or swelling; No muscle, back, or extremity pain  PSYCHIATRIC: No depression, anxiety, mood swings, or difficulty sleeping  HEME/LYMPH: No easy bruising, or bleeding gums  ALLERGY AND IMMUNOLOGIC: No hives or eczema    RADIOLOGY & ADDITIONAL TESTS:    Imaging Personally Reviewed:  [x ] YES  [ ] NO    Consultant(s) Notes Reviewed:  [x ] YES  [ ] NO    PHYSICAL EXAM:  GENERAL: NAD, well-groomed, well-developed  HEAD:  Atraumatic, Normocephalic  EYES:  conjunctiva and sclera clear  ENMT: No tonsillar erythema, exudates, or enlargement; Moist mucous membranes, No lesions  NECK: Supple, No JVD, Normal thyroid  NERVOUS SYSTEM:  Alert & Oriented X3, Good concentration  CHEST/LUNG: Clear to percussion bilaterally; No rales, rhonchi, wheezing, or rubs  HEART: Regular rate and rhythm; No murmurs, rubs, or gallops  ABDOMEN: Soft, Nontender, Nondistended; Bowel sounds present  EXTREMITIES:  2+ Peripheral Pulses, No clubbing, cyanosis, or edema, Lt BKA  LYMPH: No lymphadenopathy noted  SKIN: No rashes or lesions    Care Collaborated Discussed with Consultants/Other Providers [ x] YES  [ ] NO Patient is a 59 y/o  Male who presents with a chief complaint of sepsis.      INTERVAL HPI/OVERNIGHT EVENTS: AxOx3, denies chest pain, nausea and vomiting persists, no diarrhea, BP elevated last night, refusing oral medications, refused a.m. lab work  T(C): 37.1 (08-28-19 @ 07:37), Max: 37.6 (08-27-19 @ 21:06)  HR: 109 (08-28-19 @ 07:37) (109 - 120)  BP: 168/94 (08-28-19 @ 07:37) (106/84 - 196/82)  RR: 16 (08-28-19 @ 07:37) (14 - 19)  SpO2: 99% (08-28-19 @ 07:37) (97% - 100%)  Wt(kg): --  I&O's Summary      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  Diabetic neuropathy  Diabetes mellitus  Hyperparathyroidism  Spinal stenosis of lumbosacral region  Peripheral vascular disease  HLD (hyperlipidemia)  Coronary artery disease  Glaucoma  Anemia  CKD (chronic kidney disease)  Adrenal insufficiency  Hypertension  History of left cataract extraction  History of right cataract extraction  Below knee amputation status, left          LABS:                        12.4   9.42  )-----------( 270      ( 27 Aug 2019 10:58 )             36.6     08-27    140  |  96  |  72<H>  ----------------------------<  97  3.0<L>   |  30  |  14.00<H>    Ca    8.8      27 Aug 2019 10:58          CAPILLARY BLOOD GLUCOSE      POCT Blood Glucose.: 211 mg/dL (28 Aug 2019 07:25)  POCT Blood Glucose.: 120 mg/dL (27 Aug 2019 22:22)            MEDICATIONS  (STANDING):  chlorhexidine 2% Cloths 1 Application(s) Topical daily  cyanocobalamin 1000 MICROGram(s) Oral daily  dextrose 5% + sodium chloride 0.9%. 1000 milliLiter(s) (60 mL/Hr) IV Continuous <Continuous>  diphenhydrAMINE 25 milliGRAM(s) Oral at bedtime  epoetin beverley Injectable 4000 Unit(s) IV Push <User Schedule>  fluconAZOLE IVPB 100 milliGRAM(s) IV Intermittent every 24 hours  fludroCORTISONE 0.1 milliGRAM(s) Oral two times a day  folic acid 1 milliGRAM(s) Oral daily  gabapentin 200 milliGRAM(s) Oral daily  insulin lispro (HumaLOG) corrective regimen sliding scale   SubCutaneous three times a day before meals  meropenem  IVPB 1000 milliGRAM(s) IV Intermittent every 24 hours  mupirocin 2% Nasal 1 Application(s) Nasal every 12 hours  NIFEdipine XL 30 milliGRAM(s) Oral daily  pantoprazole Infusion 8 mG/Hr (10 mL/Hr) IV Continuous <Continuous>  simvastatin 40 milliGRAM(s) Oral at bedtime  sucralfate suspension 1 Gram(s) Oral four times a day    MEDICATIONS  (PRN):  acetaminophen  Suppository .. 650 milliGRAM(s) Rectal every 6 hours PRN Moderate Pain (4 - 6)  aluminum hydroxide/magnesium hydroxide/simethicone Suspension 30 milliLiter(s) Oral every 4 hours PRN Dyspepsia  metoclopramide Injectable 10 milliGRAM(s) IV Push every 8 hours PRN Nausea and vomiting  ondansetron Injectable 4 milliGRAM(s) IV Push every 8 hours PRN Nausea and/or Vomiting      REVIEW OF SYSTEMS:  CONSTITUTIONAL: No fever, weight loss, or fatigue  EYES: No eye pain, visual disturbances, or discharge  ENMT:  No difficulty hearing, tinnitus, vertigo; No sinus or throat pain  NECK: No pain or stiffness  RESPIRATORY: No cough, wheezing, chills or hemoptysis; No shortness of breath  CARDIOVASCULAR: No chest pain, palpitations, dizziness, or leg swelling  GASTROINTESTINAL: No abdominal or epigastric pain. reports nausea, vomiting, or hematemesis; No diarrhea or constipation. No melena or hematochezia.  GENITOURINARY: No dysuria, frequency, hematuria, or incontinence  NEUROLOGICAL: No headaches, memory loss, loss of strength, numbness, or tremors  SKIN: No itching, burning, rashes, or lesions   LYMPH NODES: No enlarged glands  ENDOCRINE: No heat or cold intolerance; No hair loss  MUSCULOSKELETAL: No joint pain or swelling; No muscle, back, or extremity pain  PSYCHIATRIC: No depression, anxiety, mood swings, or difficulty sleeping  HEME/LYMPH: No easy bruising, or bleeding gums  ALLERGY AND IMMUNOLOGIC: No hives or eczema    RADIOLOGY & ADDITIONAL TESTS:  < from: CT Abdomen and Pelvis No Cont (08.26.19 @ 11:50) >  EXAM:  CT ABDOMEN AND PELVIS                            PROCEDURE DATE:  08/26/2019          INTERPRETATION:  CLINICAL INFORMATION: Coffee ground emesis    COMPARISON: 12/11/2017    PROCEDURE:   CT of the Abdomen and Pelvis was performed without intravenous contrast.   Intravenous contrast: None.  Oral contrast: None.  Sagittal and coronal reformats were performed.    FINDINGS:    Multiple images are degraded by motion.    LOWER CHEST: Clear lung bases. Distal esophageal wall thickening.    Please note that evaluation of the abdominal organs and vascular   structures is limited by lack of intravenous contrast.    LIVER: Within normal limits.  BILE DUCTS: Mild dilatation.  GALLBLADDER: Within normal limits.  SPLEEN: Within normal limits.  PANCREAS: Within normal limits.  ADRENALS: Mild thickening.  KIDNEYS/URETERS: Right renal hypodensity, possibly cyst. No   hydronephrosis.    BLADDER: Minimally distended. Wall thickening.  REPRODUCTIVE ORGANS: Prostate is enlarged.    BOWEL: No bowel obstruction. Appendix within normal limits.  PERITONEUM: No ascites.  VESSELS: Atherosclerotic calcifications.  RETROPERITONEUM/LYMPH NODES: No lymphadenopathy.    ABDOMINAL WALL: Within normal limits.  BONES: Mild spinal degenerative changes. T11 and T12 compression   deformities, stable.    IMPRESSION:     Distal esophageal wall thickening, question esophagitis.        < end of copied text >      Imaging Personally Reviewed:  [x ] YES  [ ] NO    Consultant(s) Notes Reviewed:  [x ] YES  [ ] NO    PHYSICAL EXAM:  GENERAL: NAD, well-groomed, well-developed  HEAD:  Atraumatic, Normocephalic  EYES:  conjunctiva and sclera clear  ENMT: No tonsillar erythema, exudates, or enlargement; Moist mucous membranes, No lesions  NECK: Supple, No JVD, Normal thyroid  NERVOUS SYSTEM:  Alert & Oriented X3, Good concentration  CHEST/LUNG: Clear to percussion bilaterally; No rales, rhonchi, wheezing, or rubs  HEART: Regular rate and rhythm; No murmurs, rubs, or gallops  ABDOMEN: Soft, Nontender, Nondistended; Bowel sounds present  EXTREMITIES:  2+ Peripheral Pulses, No clubbing, cyanosis, or edema, Lt BKA  LYMPH: No lymphadenopathy noted  SKIN: No rashes or lesions    Care Collaborated Discussed with Consultants/Other Providers [ x] YES  [ ] NO

## 2019-08-29 ENCOUNTER — TRANSCRIPTION ENCOUNTER (OUTPATIENT)
Age: 59
End: 2019-08-29

## 2019-08-29 DIAGNOSIS — Z02.9 ENCOUNTER FOR ADMINISTRATIVE EXAMINATIONS, UNSPECIFIED: ICD-10-CM

## 2019-08-29 LAB
ANION GAP SERPL CALC-SCNC: 15 MMOL/L — SIGNIFICANT CHANGE UP (ref 5–17)
BUN SERPL-MCNC: 57 MG/DL — HIGH (ref 7–18)
CALCIUM SERPL-MCNC: 8.2 MG/DL — LOW (ref 8.4–10.5)
CHLORIDE SERPL-SCNC: 95 MMOL/L — LOW (ref 96–108)
CO2 SERPL-SCNC: 27 MMOL/L — SIGNIFICANT CHANGE UP (ref 22–31)
CREAT SERPL-MCNC: 12.5 MG/DL — HIGH (ref 0.5–1.3)
GLUCOSE BLDC GLUCOMTR-MCNC: 100 MG/DL — HIGH (ref 70–99)
GLUCOSE BLDC GLUCOMTR-MCNC: 142 MG/DL — HIGH (ref 70–99)
GLUCOSE BLDC GLUCOMTR-MCNC: 173 MG/DL — HIGH (ref 70–99)
GLUCOSE BLDC GLUCOMTR-MCNC: 95 MG/DL — SIGNIFICANT CHANGE UP (ref 70–99)
GLUCOSE SERPL-MCNC: 147 MG/DL — HIGH (ref 70–99)
HCT VFR BLD CALC: 37.2 % — LOW (ref 39–50)
HGB BLD-MCNC: 12.3 G/DL — LOW (ref 13–17)
MCHC RBC-ENTMCNC: 30.7 PG — SIGNIFICANT CHANGE UP (ref 27–34)
MCHC RBC-ENTMCNC: 33.1 GM/DL — SIGNIFICANT CHANGE UP (ref 32–36)
MCV RBC AUTO: 92.8 FL — SIGNIFICANT CHANGE UP (ref 80–100)
NRBC # BLD: 0 /100 WBCS — SIGNIFICANT CHANGE UP (ref 0–0)
PLATELET # BLD AUTO: 268 K/UL — SIGNIFICANT CHANGE UP (ref 150–400)
POTASSIUM SERPL-MCNC: 2.8 MMOL/L — CRITICAL LOW (ref 3.5–5.3)
POTASSIUM SERPL-SCNC: 2.8 MMOL/L — CRITICAL LOW (ref 3.5–5.3)
RBC # BLD: 4.01 M/UL — LOW (ref 4.2–5.8)
RBC # FLD: 15.1 % — HIGH (ref 10.3–14.5)
SODIUM SERPL-SCNC: 137 MMOL/L — SIGNIFICANT CHANGE UP (ref 135–145)
WBC # BLD: 5.46 K/UL — SIGNIFICANT CHANGE UP (ref 3.8–10.5)
WBC # FLD AUTO: 5.46 K/UL — SIGNIFICANT CHANGE UP (ref 3.8–10.5)

## 2019-08-29 RX ORDER — METOCLOPRAMIDE HCL 10 MG
10 TABLET ORAL
Qty: 0 | Refills: 0 | DISCHARGE
Start: 2019-08-29

## 2019-08-29 RX ORDER — GLUCAGON INJECTION, SOLUTION 0.5 MG/.1ML
1 INJECTION, SOLUTION SUBCUTANEOUS
Qty: 0 | Refills: 0 | DISCHARGE

## 2019-08-29 RX ORDER — INSULIN LISPRO 100/ML
1 VIAL (ML) SUBCUTANEOUS
Qty: 100 | Refills: 0
Start: 2019-08-29 | End: 2019-09-27

## 2019-08-29 RX ORDER — NORTRIPTYLINE HYDROCHLORIDE 10 MG/1
1 CAPSULE ORAL
Qty: 0 | Refills: 0 | DISCHARGE

## 2019-08-29 RX ORDER — PANTOPRAZOLE SODIUM 20 MG/1
40 TABLET, DELAYED RELEASE ORAL
Refills: 0 | Status: DISCONTINUED | OUTPATIENT
Start: 2019-08-29 | End: 2019-08-30

## 2019-08-29 RX ORDER — INSULIN NPH HUM/REG INSULIN HM 70-30/ML
10 VIAL (ML) SUBCUTANEOUS
Qty: 0 | Refills: 0 | DISCHARGE

## 2019-08-29 RX ORDER — POTASSIUM CHLORIDE 20 MEQ
10 PACKET (EA) ORAL EVERY 4 HOURS
Refills: 0 | Status: DISCONTINUED | OUTPATIENT
Start: 2019-08-29 | End: 2019-08-29

## 2019-08-29 RX ORDER — FLUCONAZOLE 150 MG/1
1 TABLET ORAL
Qty: 13 | Refills: 0
Start: 2019-08-29 | End: 2019-09-10

## 2019-08-29 RX ORDER — ASPIRIN/CALCIUM CARB/MAGNESIUM 324 MG
1 TABLET ORAL
Qty: 0 | Refills: 0 | DISCHARGE

## 2019-08-29 RX ORDER — PANTOPRAZOLE SODIUM 20 MG/1
1 TABLET, DELAYED RELEASE ORAL
Qty: 0 | Refills: 0 | DISCHARGE
Start: 2019-08-29

## 2019-08-29 RX ORDER — SUCRALFATE 1 G
10 TABLET ORAL
Qty: 0 | Refills: 0 | DISCHARGE
Start: 2019-08-29

## 2019-08-29 RX ORDER — ERYTHROPOIETIN 10000 [IU]/ML
1 INJECTION, SOLUTION INTRAVENOUS; SUBCUTANEOUS
Qty: 0 | Refills: 0 | DISCHARGE

## 2019-08-29 RX ORDER — PANTOPRAZOLE SODIUM 20 MG/1
1 TABLET, DELAYED RELEASE ORAL
Qty: 30 | Refills: 0
Start: 2019-08-29 | End: 2019-09-27

## 2019-08-29 RX ADMIN — Medication 1 GRAM(S): at 12:48

## 2019-08-29 RX ADMIN — Medication 1 GRAM(S): at 07:14

## 2019-08-29 RX ADMIN — GABAPENTIN 200 MILLIGRAM(S): 400 CAPSULE ORAL at 12:48

## 2019-08-29 RX ADMIN — CHLORHEXIDINE GLUCONATE 1 APPLICATION(S): 213 SOLUTION TOPICAL at 12:49

## 2019-08-29 RX ADMIN — Medication 1 GRAM(S): at 17:25

## 2019-08-29 RX ADMIN — SIMVASTATIN 40 MILLIGRAM(S): 20 TABLET, FILM COATED ORAL at 21:31

## 2019-08-29 RX ADMIN — Medication 2.5 MILLIGRAM(S): at 05:19

## 2019-08-29 RX ADMIN — PREGABALIN 1000 MICROGRAM(S): 225 CAPSULE ORAL at 12:49

## 2019-08-29 RX ADMIN — Medication 100 MILLIEQUIVALENT(S): at 10:30

## 2019-08-29 RX ADMIN — FLUDROCORTISONE ACETATE 0.1 MILLIGRAM(S): 0.1 TABLET ORAL at 17:26

## 2019-08-29 RX ADMIN — Medication 10 MILLIGRAM(S): at 03:15

## 2019-08-29 RX ADMIN — Medication 30 MILLIGRAM(S): at 07:14

## 2019-08-29 RX ADMIN — Medication 2.5 MILLIGRAM(S): at 17:29

## 2019-08-29 RX ADMIN — Medication 1 MILLIGRAM(S): at 12:48

## 2019-08-29 RX ADMIN — Medication 25 MILLIGRAM(S): at 21:30

## 2019-08-29 RX ADMIN — FLUDROCORTISONE ACETATE 0.1 MILLIGRAM(S): 0.1 TABLET ORAL at 07:14

## 2019-08-29 NOTE — PROGRESS NOTE ADULT - SUBJECTIVE AND OBJECTIVE BOX
[facility] : Progress Note :: [phone], (office [phone]),   Dr Mosher / Dr Washington / Dr Young / Dr Doll / Dr Varsha TURCIOS / Dr Tello / Dr Garcia / Dr Larry qiu  _____________________________________________________________________________________________    vomitting has resolved    fish (Rash)  liver (Anaphylaxis)  No Known Drug Allergies    Hospital Medications:   MEDICATIONS  (STANDING):  chlorhexidine 2% Cloths 1 Application(s) Topical daily  cyanocobalamin 1000 MICROGram(s) Oral daily  dextrose 5% + sodium chloride 0.9%. 1000 milliLiter(s) (60 mL/Hr) IV Continuous <Continuous>  diphenhydrAMINE 25 milliGRAM(s) Oral at bedtime  fluconAZOLE IVPB 100 milliGRAM(s) IV Intermittent every 24 hours  fludroCORTISONE 0.1 milliGRAM(s) Oral two times a day  folic acid 1 milliGRAM(s) Oral daily  gabapentin 200 milliGRAM(s) Oral daily  insulin lispro (HumaLOG) corrective regimen sliding scale   SubCutaneous three times a day before meals  meropenem  IVPB 1000 milliGRAM(s) IV Intermittent every 24 hours  metoprolol tartrate Injectable 2.5 milliGRAM(s) IV Push every 12 hours  mupirocin 2% Nasal 1 Application(s) Nasal every 12 hours  NIFEdipine XL 30 milliGRAM(s) Oral daily  pantoprazole Infusion 8 mG/Hr (10 mL/Hr) IV Continuous <Continuous>  simvastatin 40 milliGRAM(s) Oral at bedtime  sucralfate suspension 1 Gram(s) Oral four times a day        VITALS:  T(F): 97.8 (19 @ 13:15), Max: 99.3 (19 @ 20:20)  HR: 102 (19 @ 13:15)  BP: 93/53 (19 @ 13:15)  RR: 16 (19 @ 13:15)  SpO2: 100% (19 @ 13:15)  Wt(kg): --      PHYSICAL EXAM:  Constitutional: NAD  HEENT: anicteric sclera, oropharynx clear.  Neck: No JVD  Respiratory: CTAB, no wheezes, rales or rhonchi  Cardiovascular: S1, S2, RRR  Gastrointestinal: BS+, soft, NT/ND  Extremities: No peripheral edema  Neurological: A/O x 3, no focal deficits  Vascular Access: AVF with thrill and bruit    LABS:      137  |  95<L>  |  57<H>  ----------------------------<  147<H>  2.8<LL>   |  27  |  12.50<H>    Ca    8.2<L>      29 Aug 2019 07:32      Creatinine Trend: 12.50 <--, 14.00 <--, 11.20 <--, 8.68 <--, 5.98 <--, 8.14 <--, 3.62 <--                        12.3   5.46  )-----------( 268      ( 29 Aug 2019 07:32 )             37.2     Urine Studies:  Urinalysis Basic - ( 23 Aug 2019 15:52 )    Color: Yellow / Appearance: Slightly Turbid / S.010 / pH:   Gluc:  / Ketone: Negative  / Bili: Negative / Urobili: Negative   Blood:  / Protein: 100 / Nitrite: Negative   Leuk Esterase: Moderate / RBC: 25-50 /HPF / WBC >50 /HPF   Sq Epi:  / Non Sq Epi: Few /HPF / Bacteria: Moderate /HPF        RADIOLOGY & ADDITIONAL STUDIES:

## 2019-08-29 NOTE — PROGRESS NOTE ADULT - PROBLEM SELECTOR PLAN 2
2/2 klebsiella bacteremia, antibiotics per ID.
-c/w meropenem   -urine cx (Klebsiella PNA)
c/w flucanozole- will transitin to oral form on d/c
-c/w meropenem  -urine Cx (klebsiella PNA)
-c/w fluconazole Day#2

## 2019-08-29 NOTE — PROGRESS NOTE ADULT - PROBLEM SELECTOR PLAN 6
a1c-8.1% (uncntrolled)  -c/w SSI  -monitor FS
c/s SSI  -continue monitoring FS  -on clears
--c/w SSI  -monitor FS
-c/w SSI  -continue monitoring FS.

## 2019-08-29 NOTE — PROGRESS NOTE ADULT - PROBLEM SELECTOR PLAN 1
w coffeeground emesis, 2/2 gastroparesis, GI following. CT showed esophageal thickening r/o esophagitis. On ppi, zofran, reglan, carafate, IVF. Monitor Hb. On clears, advance diet as tolerated.
-repeat blood cx negative  -c/w meropenem  -afebrile, wbc wnl  -ID following Dr. Rosales
remains afebrile, wbc wnl  ID: Dr. Rosales  will d/c on cefazolin- to be given in HD-  2 gm -MW and 3 grams Fridays until 09/19/19.
c/w meropenem  -afebrile, wbc wnl  -ID following Dr. Rosales
-c/w meropenem  -afebrile, wbc wnl  -ID following Dr. Rosales.

## 2019-08-29 NOTE — PROGRESS NOTE ADULT - PROBLEM SELECTOR PROBLEM 7
Adrenal insufficiency
Non compliance w medication regimen
Adrenal insufficiency
Non compliance w medication regimen

## 2019-08-29 NOTE — PROGRESS NOTE ADULT - PROBLEM SELECTOR PLAN 4
on HD, renal following. Unable to tolerate full dialysis session yesterday due to hypotension.
-monitor lab and replete prn
BP improved  c/w antihypertensive medications
-c/w HD (T Th, S)
-BP remains  elevated  -non compliant with medications  -start on metoprolol IV hold for BP <90/60 and HR<60 BPM

## 2019-08-29 NOTE — PROGRESS NOTE ADULT - PROBLEM SELECTOR PLAN 5
Spoke with patient regarding further goals of care. he expressed that he had received a call from his brother regarding his refusal of medications and was given a "talking to." He expressed that he would be more compliant. Regarding advance directives, discussed risks/benefits of CPR, he stated he wishes to stay alive and would want them to try to resuscitate him. He remains full code at present. Support provided.     Total advance care planning discussion time: 16 min
-c/w HD TTS
c/w HD (T,Th, S)  -renal following. Dr. Mosher
-BP elevated  -non compliant with medications  -monitor BP
-c/w HD (T Th, S)  -Renal following: Dr. Mosher

## 2019-08-29 NOTE — PROGRESS NOTE ADULT - ASSESSMENT
Patient is a 58y Male with ESRD on HD TTS at [facility] via AVF. Presented to dialysis feeling fine, afebrile. Into dialysis had rigors and chills with fever of 103 associated with vomiting. blood cultures were drawn ( which are resulting negative as of now). given vanco and gentamicin. initially refused to come to hospital but agreed later. in ED found with fever, leucocytosis and vomiting. blood cultures  are growing gram negative rods. renal consulted for ESRD     # ESRD S/P HD today against a 4 K+ bath k-rider disconitued.  # VOMMITTING FROM DIABETIC GASTROPARESIS. CONT REGLAN.  # FEVER WITH GRAM NEGATIVE BACTREMIA-  D/W with Infectious disease specialist will get ancef on HD upon discharge  # ANENIA OF CKD. STABLE. D/C procrit  # HTN BP CONTROLLED   # PHOS AT GOAL

## 2019-08-29 NOTE — PROGRESS NOTE ADULT - SUBJECTIVE AND OBJECTIVE BOX
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  Diabetic neuropathy  Diabetes mellitus  Hyperparathyroidism  Spinal stenosis of lumbosacral region  Peripheral vascular disease  HLD (hyperlipidemia)  Coronary artery disease  Glaucoma  Anemia  CKD (chronic kidney disease)  Adrenal insufficiency  Hypertension  History of left cataract extraction  History of right cataract extraction  Below knee amputation status, left          LABS:                        12.3   5.46  )-----------( 268      ( 29 Aug 2019 07:32 )             37.2     08-29    137  |  95<L>  |  57<H>  ----------------------------<  147<H>  2.8<LL>   |  27  |  12.50<H>    Ca    8.2<L>      29 Aug 2019 07:32          CAPILLARY BLOOD GLUCOSE      POCT Blood Glucose.: 100 mg/dL (29 Aug 2019 11:41)  POCT Blood Glucose.: 142 mg/dL (29 Aug 2019 07:38)  POCT Blood Glucose.: 170 mg/dL (28 Aug 2019 21:58)  POCT Blood Glucose.: 200 mg/dL (28 Aug 2019 16:58)            MEDICATIONS  (STANDING):  chlorhexidine 2% Cloths 1 Application(s) Topical daily  cyanocobalamin 1000 MICROGram(s) Oral daily  dextrose 5% + sodium chloride 0.9%. 1000 milliLiter(s) (60 mL/Hr) IV Continuous <Continuous>  diphenhydrAMINE 25 milliGRAM(s) Oral at bedtime  fluconAZOLE IVPB 100 milliGRAM(s) IV Intermittent every 24 hours  fludroCORTISONE 0.1 milliGRAM(s) Oral two times a day  folic acid 1 milliGRAM(s) Oral daily  gabapentin 200 milliGRAM(s) Oral daily  insulin lispro (HumaLOG) corrective regimen sliding scale   SubCutaneous three times a day before meals  meropenem  IVPB 1000 milliGRAM(s) IV Intermittent every 24 hours  metoprolol tartrate Injectable 2.5 milliGRAM(s) IV Push every 12 hours  mupirocin 2% Nasal 1 Application(s) Nasal every 12 hours  NIFEdipine XL 30 milliGRAM(s) Oral daily  pantoprazole Infusion 8 mG/Hr (10 mL/Hr) IV Continuous <Continuous>  simvastatin 40 milliGRAM(s) Oral at bedtime  sucralfate suspension 1 Gram(s) Oral four times a day    MEDICATIONS  (PRN):  acetaminophen  Suppository .. 650 milliGRAM(s) Rectal every 6 hours PRN Moderate Pain (4 - 6)  aluminum hydroxide/magnesium hydroxide/simethicone Suspension 30 milliLiter(s) Oral every 4 hours PRN Dyspepsia  metoclopramide Injectable 10 milliGRAM(s) IV Push every 8 hours PRN Nausea and vomiting      REVIEW OF SYSTEMS:  CONSTITUTIONAL: No fever, weight loss, or fatigue  EYES: No eye pain, visual disturbances, or discharge  ENMT:  No difficulty hearing, tinnitus, vertigo; No sinus or throat pain  NECK: No pain or stiffness  RESPIRATORY: No cough, wheezing, chills or hemoptysis; No shortness of breath  CARDIOVASCULAR: No chest pain, palpitations, dizziness, or leg swelling  GASTROINTESTINAL: No abdominal or epigastric pain. No nausea, vomiting, or hematemesis; No diarrhea or constipation. No melena or hematochezia.  GENITOURINARY: No dysuria, frequency, hematuria, or incontinence  NEUROLOGICAL: No headaches, memory loss, loss of strength, numbness, or tremors  SKIN: No itching, burning, rashes, or lesions   ENDOCRINE: No heat or cold intolerance; No hair loss  MUSCULOSKELETAL: No joint pain or swelling; No muscle, back, or extremity pain  PSYCHIATRIC: No depression, anxiety, mood swings, or difficulty sleeping  HEME/LYMPH: No easy bruising, or bleeding gums  ALLERGY AND IMMUNOLOGIC: No hives or eczema    RADIOLOGY & ADDITIONAL TESTS:  < from: CT Abdomen and Pelvis No Cont (08.26.19 @ 11:50) >  EXAM:  CT ABDOMEN AND PELVIS                            PROCEDURE DATE:  08/26/2019          INTERPRETATION:  CLINICAL INFORMATION: Coffee ground emesis    COMPARISON: 12/11/2017    PROCEDURE:   CT of the Abdomen and Pelvis was performed without intravenous contrast.   Intravenous contrast: None.  Oral contrast: None.  Sagittal and coronal reformats were performed.    FINDINGS:    Multiple images are degraded by motion.    LOWER CHEST: Clear lung bases. Distal esophageal wall thickening.    Please note that evaluation of the abdominal organs and vascular   structures is limited by lack of intravenous contrast.    LIVER: Within normal limits.  BILE DUCTS: Mild dilatation.  GALLBLADDER: Within normal limits.  SPLEEN: Within normal limits.  PANCREAS: Within normal limits.  ADRENALS: Mild thickening.  KIDNEYS/URETERS: Right renal hypodensity, possibly cyst. No   hydronephrosis.    BLADDER: Minimally distended. Wall thickening.  REPRODUCTIVE ORGANS: Prostate is enlarged.    BOWEL: No bowel obstruction. Appendix within normal limits.  PERITONEUM: No ascites.  VESSELS: Atherosclerotic calcifications.  RETROPERITONEUM/LYMPH NODES: No lymphadenopathy.    ABDOMINAL WALL: Within normal limits.  BONES: Mild spinal degenerative changes. T11 and T12 compression   deformities, stable.    IMPRESSION:     Distal esophageal wall thickening, question esophagitis.    < end of copied text >      Imaging Personally Reviewed:  [x ] YES  [ ] NO    Consultant(s) Notes Reviewed:  [x ] YES  [ ] NO    PHYSICAL EXAM:  GENERAL: NAD, well-groomed, well-developed  HEAD:  Atraumatic, Normocephalic  EYES:  Lt eye blindness, conjunctiva and sclera clear  ENMT: No tonsillar erythema, exudates, or enlargement; Moist mucous membranes, no lesions  NECK: Supple, No JVD, Normal thyroid  NERVOUS SYSTEM:  Alert & Oriented X3, Good concentration  CHEST/LUNG: Clear to percussion bilaterally; No rales, rhonchi, wheezing, or rubs  HEART: Regular rate and rhythm; No murmurs, rubs, or gallops  ABDOMEN: Soft, Nontender, Nondistended; Bowel sounds present  EXTREMITIES:  2+ Peripheral Pulses, No clubbing, cyanosis, or edema, Lt BKA  LYMPH: No lymphadenopathy noted  SKIN: No rashes or lesions    Care Collaborated Discussed with Consultants/Other Providers [ ] YES  [ ] NO

## 2019-08-29 NOTE — PROGRESS NOTE ADULT - PROVIDER SPECIALTY LIST ADULT
Infectious Disease
Internal Medicine
Nephrology
Palliative Care
Internal Medicine
Gastroenterology

## 2019-08-29 NOTE — PROGRESS NOTE ADULT - ASSESSMENT
Patient is a 58y old  [facility], with DM (on insulin), partial vision loss, ESRD (on TTS HD via LUE AVF), s/p left BKA and adrenal insufficiency has sent to the ER for  evaluation of fever and chills, during his dialysis session.  He has no cough,  No shortness of breath, No abdominal pain, nausea, vomiting, diarrhea, or dysuria. On admission, he found to have Fever, tachycardia, Leukocytosis and positive Urine analysis.  The CXR shows Lungs are clear. He has started on Zosyn and Vancomycin , and The ID consult requested to assist with further evaluation  and antibiotic management.     # Sepsis ( Fever + tachycardia + leukocytosis )  # UTI - Klebsiella  # High grade Gram Negative Bacteremia - Klebsiella 8/23- Most likely source is  - repeat  Bcx NGTD as of 8/24  # Hematemesis  # Esophagitis - on fluconazole 8/27    would recommend:    1. Continue protonix and  Antiemetic agents since refusing oral Meds  2. Change  Meropenem to Cefazolin  which can be given post dialysis as 2 g on Monday, Wednesday and  3 g Friday  3.  IV Fluconazole can be change to oral fluconazole on discharge to continue  until 9/10/19      d/w Dr. Mosher,  Covering NP, Dominic and Patient     will follow the patient with you

## 2019-08-29 NOTE — PROGRESS NOTE ADULT - REASON FOR ADMISSION
sepsis

## 2019-08-29 NOTE — PROGRESS NOTE ADULT - PROBLEM SELECTOR PLAN 8
-CT abdomen shows esophageal wall thickening, ?esophagitis  -H&H stable  -c/w with protonix drip  -c/w sucralfate  -GI following Golyan
Medically cleared for discharge  Planned discharge to [facility] 08/30/19 @ 12 NN
H&H remains stable  -c/w protonix drip  -c/w sucralfate  GI following Golyan

## 2019-08-29 NOTE — PROGRESS NOTE ADULT - SUBJECTIVE AND OBJECTIVE BOX
Patient is seen and examined at the bed side, is afebrile. He feels better today.        REVIEW OF SYSTEMS: All other review systems are negative         ALLERGIES:  fish (Rash)  liver (Anaphylaxis)  No Known Drug Allergies      Vital Signs Last 24 Hrs  T(C): 36.6 (29 Aug 2019 13:15), Max: 37.4 (28 Aug 2019 20:20)  T(F): 97.8 (29 Aug 2019 13:15), Max: 99.3 (28 Aug 2019 20:20)  HR: 102 (29 Aug 2019 13:15) (98 - 113)  BP: 93/53 (29 Aug 2019 13:15) (93/53 - 179/97)  BP(mean): --  RR: 16 (29 Aug 2019 13:15) (16 - 17)  SpO2: 100% (29 Aug 2019 13:15) (96% - 100%)      PHYSICAL EXAM:  GENERAL: Not in distress  CHEST/LUNG:  Air entry bilaterally  HEART: s1 and s2 present  ABDOMEN:  Nontender and  Nondistended  EXTREMITIES: Left BKA  CNS: Awake and Alert        LABS:                         12.3   5.46  )-----------( 268      ( 29 Aug 2019 07:32 )             37.2                12.7   8.62  )-----------( 224      ( 26 Aug 2019 15:37 )             38.9                           12.8   13.17 )-----------( 203      ( 24 Aug 2019 19:11 )             39.4         08    137  |  95<L>  |  57<H>  ----------------------------<  147<H>  2.8<LL>   |  27  |  12.50<H>    Ca    8.2<L>      29 Aug 2019 07:32      08-27    140  |  96  |  72<H>  ----------------------------<  97  3.0<L>   |  30  |  14.00<H>    Ca    8.8      27 Aug 2019 10:58      TPro  8.6<H>  /  Alb  3.4<L>  /  TBili  0.6  /  DBili  x   /  AST  17  /  ALT  16  /  AlkPhos  110        08    138  |  95<L>  |  60<H>  ----------------------------<  188<H>  3.2<L>   |  24  |  11.20<H>    Ca    9.1      26 Aug 2019 15:38  Phos  4.3       Mg     2.7         TPro  8.6<H>  /  Alb  3.4<L>  /  TBili  0.6  /  DBili  x   /  AST  17  /  ALT  16  /  AlkPhos  110          CAPILLARY BLOOD GLUCOSE  POCT Blood Glucose.: 78 mg/dL (23 Aug 2019 11:30)  POCT Blood Glucose.: 88 mg/dL (23 Aug 2019 07:58)  POCT Blood Glucose.: 125 mg/dL (22 Aug 2019 23:34)        Urinalysis Basic - ( 23 Aug 2019 15:52 )  Color: Yellow / Appearance: Slightly Turbid / S.010 / pH: x  Gluc: x / Ketone: Negative  / Bili: Negative / Urobili: Negative   Blood: x / Protein: 100 / Nitrite: Negative   Leuk Esterase: Moderate / RBC: 25-50 /HPF / WBC >50 /HPF   Sq Epi: x / Non Sq Epi: Few /HPF / Bacteria: Moderate /HPF        MEDICATIONS  (STANDING):  chlorhexidine 2% Cloths 1 Application(s) Topical daily  cyanocobalamin 1000 MICROGram(s) Oral daily  dextrose 5% + sodium chloride 0.9%. 1000 milliLiter(s) (60 mL/Hr) IV Continuous <Continuous>  diphenhydrAMINE 25 milliGRAM(s) Oral at bedtime  fluconAZOLE IVPB 100 milliGRAM(s) IV Intermittent every 24 hours  fludroCORTISONE 0.1 milliGRAM(s) Oral two times a day  folic acid 1 milliGRAM(s) Oral daily  gabapentin 200 milliGRAM(s) Oral daily  insulin lispro (HumaLOG) corrective regimen sliding scale   SubCutaneous three times a day before meals  meropenem  IVPB 1000 milliGRAM(s) IV Intermittent every 24 hours  metoprolol tartrate Injectable 2.5 milliGRAM(s) IV Push every 12 hours  mupirocin 2% Nasal 1 Application(s) Nasal every 12 hours  NIFEdipine XL 30 milliGRAM(s) Oral daily  pantoprazole Infusion 8 mG/Hr (10 mL/Hr) IV Continuous <Continuous>  simvastatin 40 milliGRAM(s) Oral at bedtime  sucralfate suspension 1 Gram(s) Oral four times a day    MEDICATIONS  (PRN):  acetaminophen  Suppository .. 650 milliGRAM(s) Rectal every 6 hours PRN Moderate Pain (4 - 6)  aluminum hydroxide/magnesium hydroxide/simethicone Suspension 30 milliLiter(s) Oral every 4 hours PRN Dyspepsia  metoclopramide Injectable 10 milliGRAM(s) IV Push every 8 hours PRN Nausea and vomiting      RADIOLOGY & ADDITIONAL TESTS:    19 : CT Abdomen and Pelvis No Cont (19 @ 11:50) Distal esophageal wall thickening, question esophagitis.      19 : Xray Chest 1 View-PORTABLE IMMEDIATE (19 @ 18:27) The cardiac silhouette is within normal limits. The lungs are clear. No pleural abnormality.            MICROBIOLOGY DATA:      Culture - Blood (19 @ 23:06)    Specimen Source: .Blood    Culture Results:   No growth to date.        Culture - Blood (19 @ 23:06)    Specimen Source: .Blood    Culture Results:   No growth to date.      Culture - Urine (19 @ 02:13)    -  Amikacin: S <=16    -  Ampicillin: R >16 These ampicillin results predict results for amoxicillin    -  Ampicillin/Sulbactam: S <=8/4 Enterobacter, Citrobacter, and Serratia may develop resistance during prolonged therapy (3-4 days)    -  Aztreonam: S <=4    -  Cefazolin: S <=8 (MIC_CL_COM_ENTERIC_CEFAZU) For uncomplicated UTI with K. pneumoniae, E. coli, or P. mirablis: TERESA <=16 is sensitive and TERESA >=32 is resistant. This also predicts results for oral agents cefaclor, cefdinir, cefpodoxime, cefprozil, cefuroxime axetil, cephalexin and locarbef for uncomplicated UTI. Note that some isolates may be susceptible to these agents while testing resistant to cefazolin.    -  Cefepime: S <=4    -  Cefoxitin: S <=8    -  Gentamicin: S <=4    -  Imipenem: S <=1    -  Levofloxacin: S <=2    -  Meropenem: S <=1    -  Nitrofurantoin: I 64 Should not be used to treat pyelonephritis    -  Piperacillin/Tazobactam: S <=16    -  Tigecycline: S <=2    -  Tobramycin: S <=4    -  Trimethoprim/Sulfamethoxazole: S <=2/38    -  Ceftriaxone: S <=1 Enterobacter, Citrobacter, and Serratia may develop resistance during prolonged therapy    -  Ciprofloxacin: S <=1    Specimen Source: .Urine    Culture Results:   50,000 - 99,000 CFU/mL Klebsiella pneumoniae    Organism Identification: Klebsiella pneumoniae    Organism: Klebsiella pneumoniae    Method Type: TERESA      Urine Microscopic-Add On (NC) (19 @ 15:52)    Bacteria: Moderate /HPF    Comment - Urine: moderate amorphous sediments present    Epithelial Cells: Few /HPF    Red Blood Cell - Urine: 25-50 /HPF    White Blood Cell - Urine: >50 /HPF      Culture - Blood (19 @ 01:18)    Specimen Source: .Blood    Culture Results:   No growth to date.      Culture - Blood (19 @ 01:18)    -  Trimethoprim/Sulfamethoxazole: S <=2/38    -  Tobramycin: S <=2    -  Piperacillin/Tazobactam: S <=8    -  Meropenem: S <=1    -  Levofloxacin: S <=2    -  Gentamicin: S <=2    -  Imipenem: S <=1    -  Klebsiella pneumoniae: Detec    Gram Stain:   Growth in aerobic bottle: Gram Variable Rods    -  Amikacin: S <=16    -  Ampicillin: R 16 These ampicillin results predict results for amoxicillin    -  Aztreonam: S <=4    -  Ampicillin/Sulbactam: S <=4/2 Enterobacter, Citrobacter, and Serratia may develop resistance during prolonged therapy (3-4 days)    -  Ceftriaxone: S <=1 Enterobacter, Citrobacter, and Serratia may develop resistance during prolonged therapy    -  Cefoxitin: S <=8    -  Cefepime: S <=2    -  Cefazolin: S <=2 Enterobacter, Citrobacter, and Serratia may develop resistance during prolonged therapy (3-4 days)    -  Ertapenem: S <=0.5    -  Ciprofloxacin: S <=1    Specimen Source: .Blood    Organism: Blood Culture PCR    Organism: Klebsiella pneumoniae    Culture Results:   Growth in aerobic bottle: Klebsiella pneumoniae  "Due to technical problems, Proteus sp. will Not be reported as part of  the BCID panel until further notice"  ***Blood Panel PCR results on this specimen are available  approximately 3 hours after the Gram stain result.***  Gram stain, PCR, and/or culture results may not always  correspond due to difference in methodologies.  ************************************************************  This PCR assay was performed using [employer].  The following targets are tested for: Enterococcus,  vancomycin resistant enterococci, Listeria monocytogenes,  coagulase negative staphylococci, S. aureus,  methicillin resistant S. aureus, Streptococcus agalactiae  (Group B), S. pneumoniae, S. pyogenes (Group A),  Acinetobacter baumannii, Enterobacter cloacae, E. coli,  Klebsiella oxytoca, K. pneumoniae, Proteus sp.,  Serratia marcescens, Haemophilus influenzae,  Neisseria meningitidis, Pseudomonas aeruginosa, Candida  albicans, C. glabrata, C krusei, C parapsilosis,  C. tropicalis and the KPC resistance gene.    Organism Identification: Blood Culture PCR  Klebsiella pneumoniae    Method Type: PCR    Method Type: TERESA

## 2019-08-29 NOTE — PROGRESS NOTE ADULT - PROBLEM SELECTOR PROBLEM 2
Sepsis
Esophageal abnormality
UTI (urinary tract infection)
UTI (urinary tract infection)
Esophageal abnormality

## 2019-08-29 NOTE — PROGRESS NOTE ADULT - PROBLEM SELECTOR PLAN 3
s/p L BKA, ambulatory w prosthesis/RW. usu independent in ADLs.
- coffee ground vomitus (H&H stable)  -likely 2/2 to gastroparesis, ?uremia  -NPO for now  -c/w reglan and zofran
-no episode today  -c/w PPI drips, reglan and zofran prn
-improved  -c/w reglan and zofran prn
-vomited x1 small amount of mucoid gastric content  -c/w IV protonix  -c/w reglan and zofran  -continue on clear diet

## 2019-08-29 NOTE — DISCHARGE NOTE PROVIDER - NSDCFUSCHEDAPPT_GEN_ALL_CORE_FT
KIAN VALERO ; 08/30/2019 ; [facility] [address] [address] KIAN Bran ; 08/30/2019 ; [facility] [address] KIAN VALERO ; 08/30/2019 ; [facility] [address] KINA Bran ; 08/30/2019 ; [facility] [address]

## 2019-08-29 NOTE — DISCHARGE NOTE PROVIDER - HOSPITAL COURSE
57 y/o male from [facility], with PMHx of DM (on insulin), partial vision loss, ESRD (on TTS HD via LUE AVF), s/p left BKA and adrenal insufficiency is sent to the ED for fever during his dialysis session today. Patient reports he completed 4 hours of dialysis PTA. He reports his fever started today and is associated with chills. 59 y/o male from [facility], with PMHx of DM (on insulin), partial vision loss, ESRD (on TTS HD via LUE AVF), s/p left BKA and adrenal insufficiency is sent to the ED for fever during his dialysis session today. Patient reports he completed 4 hours of dialysis PTA. He reports his fever started today and is associated with chills. Pt was admitted for     for sepsis likely secondary to UTI 57 y/o male from [facility], with PMHx of DM (on insulin), partial vision loss, ESRD (on TTS HD via LUE AVF), s/p left BKA and adrenal insufficiency is sent to the ED for fever during his dialysis session today. Patient reports he completed 4 hours of dialysis PTA. He reports his fever started today and is associated with chills. Pt was admitted for     for sepsis secondary to UTI (klebsiella PNA). and treated with zosyn. 57 y/o male from [facility], with PMHx of DM (on insulin), partial vision loss, ESRD (on TTS HD via LUE AVF), s/p left BKA and adrenal insufficiency is sent to the ED for fever during his dialysis session today. Patient reports he completed 4 hours of dialysis PTA. He reports his fever started today and is associated with chills. Pt was admitted for     for sepsis secondary to UTI (klebsiella PNA) and treated with zosyn. 59 y/o male from [facility], with PMHx of DM (on insulin), partial vision loss, ESRD (on TTS HD via LUE AVF), s/p left BKA and adrenal insufficiency is sent to the ED for fever during his dialysis session today. Patient reports he completed 4 hours of dialysis PTA. He reports his fever started today and is associated with chills. Pt was admitted for      sepsis secondary to UTI (klebsiella PNA) and treated with zosyn. Pt had multiple episodes  of 59 y/o male from [facility], with PMHx of DM (on insulin), partial vision loss, ESRD (on TTS HD via LUE AVF), s/p left BKA and adrenal insufficiency was sent to the ED for fever during dialysis session. Patient reported  completing 4 hours of dialysis PTA. Pt reported that fever was associated with chills. Pt was admitted for sepsis secondary to UTI (klebsiella PNA) and treated with zosyn. Pt had multiple episodes  of nausea and vomiting. Pt treated with Protonix drip, Reglan and pantoprazole. Pt also had CT of abdomen which showed distal esophageal wall thickening, question esophagitis. Pt was treated with fluconazole IV. Pt's nausea and vomiting improved. Pt goes to HD three times a week.  Pt with compliance issue with taking medications refusing oral medications and pretending to be unresponsive at times, rapid response were called and found in no distress.  Seen by psychiatry to evaluate for capacity and found to be capable of making decisions. Patient's condition improved but with continued non compliance in taking medications. Pt medically cleared for discharge to  [facility] on [facility]. 59 y/o male from [facility], with PMHx of DM (on insulin), partial vision loss, ESRD (on TTS HD via LUE AVF), s/p left BKA and adrenal insufficiency was sent to the ED for fever during dialysis session. Patient reported  completing 4 hours of dialysis PTA. Pt reported that fever was associated with chills. Pt was admitted for sepsis secondary to UTI (klebsiella PNA) and treated with Zosyn. Pt had multiple episodes  of nausea and vomiting. Pt treated with Protonix drip, Reglan and pantoprazole. Pt also had CT of abdomen which showed distal esophageal wall thickening, question esophagitis. Pt was treated with fluconazole IV. Pt's nausea and vomiting improved. Pt goes to HD three times a week.  Pt with compliance issue with taking medications refusing oral medications and pretending to be unresponsive at times, rapid response called each time and found in no distress.  Seen by psychiatry to evaluate for capacity and found to be capable of making decisions. Patient's condition improved but with continued non compliance in taking medications. Pt medically cleared for discharge to  [facility] on antibiotics.

## 2019-08-29 NOTE — DISCHARGE NOTE PROVIDER - CARE PROVIDER_API CALL
Marcos Mosher)  Internal Medicine; Nephrology  [address] [place] [address]  Phone: [phone]  Fax: [phone]  Follow Up Time:

## 2019-08-29 NOTE — PROGRESS NOTE ADULT - PROBLEM SELECTOR PROBLEM 3
Debility
Intractable nausea and vomiting
Nausea & vomiting
Intractable nausea and vomiting
Nausea & vomiting

## 2019-08-29 NOTE — PROGRESS NOTE ADULT - PROBLEM SELECTOR PROBLEM 4
ESRD (end stage renal disease) on dialysis
HTN (hypertension)
Hypokalemia
ESRD (end stage renal disease) on dialysis
HTN (hypertension)

## 2019-08-29 NOTE — PROGRESS NOTE ADULT - PROBLEM SELECTOR PLAN 7
-c/w fludrocortisone
c/w supportive care
-c/w fludrocortisone
-still refusing oral medications  -seen by psych: Dr. Phelan- patient has capacity, continue positive encouragement to comply with medical management

## 2019-08-29 NOTE — DISCHARGE NOTE PROVIDER - NSDCCPCAREPLAN_GEN_ALL_CORE_FT
PRINCIPAL DISCHARGE DIAGNOSIS  Diagnosis: Sepsis  Assessment and Plan of Treatment: Take all antibiotics as ordered.  Call you Health care provider upon arrival home to make a one week follow up appointment.  If you develop fever, chills, malaise, or change in mental status call your Health Care Provider or go to the Emergency Department.  Nutrition is important, eat small frequent meals to help ensure you get adequate calories.  Do not stay in bed all day!  Increase your activity daily as tolerated.      SECONDARY DISCHARGE DIAGNOSES  Diagnosis: UTI (urinary tract infection)  Assessment and Plan of Treatment: SEEK MEDICAL CARE IF:  You have back pain.  You develop a fever.  Your symptoms do not begin to resolve within 3 days.  SEEK IMMEDIATE MEDICAL CARE IF:  You have severe back pain or lower abdominal pain.  You develop chills.  You have nausea or vomiting.  You have continued burning or discomfort with urination.    Diagnosis: Esophageal abnormality  Assessment and Plan of Treatment: Your CT scan showed esophageal wall thickening and probable esophatitis. Continue with diflucan as ordered    Diagnosis: ESRD (end stage renal disease) on dialysis  Assessment and Plan of Treatment: Continue with hemodialys TIW    Diagnosis: Intractable nausea and vomiting  Assessment and Plan of Treatment: Improved  please continue to take reglan, protonix, zofran, and carafate    Diagnosis: Adrenal insufficiency  Assessment and Plan of Treatment: continue with your home med of fludrocortisone    Diagnosis: Bacteremia  Assessment and Plan of Treatment: You had positive blood cultures. Your repeat blood cultures are negative. Please continue to take your antibiotics as prescribed.

## 2019-08-30 VITALS
OXYGEN SATURATION: 100 % | HEART RATE: 989 BPM | RESPIRATION RATE: 17 BRPM | TEMPERATURE: 99 F | DIASTOLIC BLOOD PRESSURE: 56 MMHG | SYSTOLIC BLOOD PRESSURE: 106 MMHG

## 2019-08-30 LAB
ANION GAP SERPL CALC-SCNC: 10 MMOL/L — SIGNIFICANT CHANGE UP (ref 5–17)
BUN SERPL-MCNC: 30 MG/DL — HIGH (ref 7–18)
CALCIUM SERPL-MCNC: 8 MG/DL — LOW (ref 8.4–10.5)
CHLORIDE SERPL-SCNC: 100 MMOL/L — SIGNIFICANT CHANGE UP (ref 96–108)
CO2 SERPL-SCNC: 28 MMOL/L — SIGNIFICANT CHANGE UP (ref 22–31)
CREAT SERPL-MCNC: 8.27 MG/DL — HIGH (ref 0.5–1.3)
CULTURE RESULTS: SIGNIFICANT CHANGE UP
CULTURE RESULTS: SIGNIFICANT CHANGE UP
GLUCOSE BLDC GLUCOMTR-MCNC: 178 MG/DL — HIGH (ref 70–99)
GLUCOSE BLDC GLUCOMTR-MCNC: 235 MG/DL — HIGH (ref 70–99)
GLUCOSE SERPL-MCNC: 193 MG/DL — HIGH (ref 70–99)
HCT VFR BLD CALC: 35.7 % — LOW (ref 39–50)
HGB BLD-MCNC: 11.5 G/DL — LOW (ref 13–17)
MCHC RBC-ENTMCNC: 30 PG — SIGNIFICANT CHANGE UP (ref 27–34)
MCHC RBC-ENTMCNC: 32.2 GM/DL — SIGNIFICANT CHANGE UP (ref 32–36)
MCV RBC AUTO: 93.2 FL — SIGNIFICANT CHANGE UP (ref 80–100)
NRBC # BLD: 0 /100 WBCS — SIGNIFICANT CHANGE UP (ref 0–0)
PLATELET # BLD AUTO: 228 K/UL — SIGNIFICANT CHANGE UP (ref 150–400)
POTASSIUM SERPL-MCNC: 3.3 MMOL/L — LOW (ref 3.5–5.3)
POTASSIUM SERPL-SCNC: 3.3 MMOL/L — LOW (ref 3.5–5.3)
RBC # BLD: 3.83 M/UL — LOW (ref 4.2–5.8)
RBC # FLD: 15.4 % — HIGH (ref 10.3–14.5)
SODIUM SERPL-SCNC: 138 MMOL/L — SIGNIFICANT CHANGE UP (ref 135–145)
SPECIMEN SOURCE: SIGNIFICANT CHANGE UP
SPECIMEN SOURCE: SIGNIFICANT CHANGE UP
WBC # BLD: 4.6 K/UL — SIGNIFICANT CHANGE UP (ref 3.8–10.5)
WBC # FLD AUTO: 4.6 K/UL — SIGNIFICANT CHANGE UP (ref 3.8–10.5)

## 2019-08-30 PROCEDURE — 36415 COLL VENOUS BLD VENIPUNCTURE: CPT

## 2019-08-30 PROCEDURE — 97162 PT EVAL MOD COMPLEX 30 MIN: CPT

## 2019-08-30 PROCEDURE — 83605 ASSAY OF LACTIC ACID: CPT

## 2019-08-30 PROCEDURE — 84100 ASSAY OF PHOSPHORUS: CPT

## 2019-08-30 PROCEDURE — 87150 DNA/RNA AMPLIFIED PROBE: CPT

## 2019-08-30 PROCEDURE — 71045 X-RAY EXAM CHEST 1 VIEW: CPT

## 2019-08-30 PROCEDURE — 87186 SC STD MICRODIL/AGAR DIL: CPT

## 2019-08-30 PROCEDURE — 80048 BASIC METABOLIC PNL TOTAL CA: CPT

## 2019-08-30 PROCEDURE — 87040 BLOOD CULTURE FOR BACTERIA: CPT

## 2019-08-30 PROCEDURE — 96375 TX/PRO/DX INJ NEW DRUG ADDON: CPT

## 2019-08-30 PROCEDURE — 85730 THROMBOPLASTIN TIME PARTIAL: CPT

## 2019-08-30 PROCEDURE — 82962 GLUCOSE BLOOD TEST: CPT

## 2019-08-30 PROCEDURE — 85027 COMPLETE CBC AUTOMATED: CPT

## 2019-08-30 PROCEDURE — 99261: CPT

## 2019-08-30 PROCEDURE — 85610 PROTHROMBIN TIME: CPT

## 2019-08-30 PROCEDURE — 80053 COMPREHEN METABOLIC PANEL: CPT

## 2019-08-30 PROCEDURE — 74176 CT ABD & PELVIS W/O CONTRAST: CPT

## 2019-08-30 PROCEDURE — 83735 ASSAY OF MAGNESIUM: CPT

## 2019-08-30 PROCEDURE — 99285 EMERGENCY DEPT VISIT HI MDM: CPT | Mod: 25

## 2019-08-30 PROCEDURE — 93005 ELECTROCARDIOGRAM TRACING: CPT

## 2019-08-30 PROCEDURE — 96374 THER/PROPH/DIAG INJ IV PUSH: CPT

## 2019-08-30 PROCEDURE — 87086 URINE CULTURE/COLONY COUNT: CPT

## 2019-08-30 PROCEDURE — 82010 KETONE BODYS QUAN: CPT

## 2019-08-30 PROCEDURE — 81001 URINALYSIS AUTO W/SCOPE: CPT

## 2019-08-30 RX ORDER — POTASSIUM CHLORIDE 20 MEQ
20 PACKET (EA) ORAL ONCE
Refills: 0 | Status: COMPLETED | OUTPATIENT
Start: 2019-08-30 | End: 2019-08-30

## 2019-08-30 RX ADMIN — Medication 1 MILLIGRAM(S): at 11:06

## 2019-08-30 RX ADMIN — CHLORHEXIDINE GLUCONATE 1 APPLICATION(S): 213 SOLUTION TOPICAL at 11:10

## 2019-08-30 RX ADMIN — PANTOPRAZOLE SODIUM 40 MILLIGRAM(S): 20 TABLET, DELAYED RELEASE ORAL at 06:31

## 2019-08-30 RX ADMIN — Medication 1 GRAM(S): at 01:11

## 2019-08-30 RX ADMIN — MUPIROCIN 1 APPLICATION(S): 20 OINTMENT TOPICAL at 05:32

## 2019-08-30 RX ADMIN — Medication 1 GRAM(S): at 11:06

## 2019-08-30 RX ADMIN — PREGABALIN 1000 MICROGRAM(S): 225 CAPSULE ORAL at 11:06

## 2019-08-30 RX ADMIN — Medication 2.5 MILLIGRAM(S): at 05:32

## 2019-08-30 RX ADMIN — FLUDROCORTISONE ACETATE 0.1 MILLIGRAM(S): 0.1 TABLET ORAL at 05:32

## 2019-08-30 RX ADMIN — Medication 30 MILLIGRAM(S): at 05:32

## 2019-08-30 RX ADMIN — Medication 20 MILLIEQUIVALENT(S): at 11:59

## 2019-08-30 RX ADMIN — GABAPENTIN 200 MILLIGRAM(S): 400 CAPSULE ORAL at 11:06

## 2019-08-30 RX ADMIN — Medication 1 GRAM(S): at 05:32

## 2019-08-30 RX ADMIN — Medication 2: at 11:58

## 2019-08-30 RX ADMIN — Medication 1: at 08:22

## 2019-10-04 ENCOUNTER — APPOINTMENT (OUTPATIENT)
Dept: VASCULAR SURGERY | Facility: CLINIC | Age: 59
End: 2019-10-04
Payer: MEDICAID

## 2019-10-04 PROCEDURE — 99213 OFFICE O/P EST LOW 20 MIN: CPT

## 2019-10-04 PROCEDURE — 93990 DOPPLER FLOW TESTING: CPT

## 2019-10-04 NOTE — ASSESSMENT
[FreeTextEntry1] : Patient with renal failure on hemodialysis with severe stenosis of the right arm AV fistula with prolonged bleeding. Plan for fistulogram and anoplasty.

## 2019-10-04 NOTE — PHYSICAL EXAM
[Pulsatile Thrill] : pulsatile thrill [Aneurysm] : no aneurysm [Bleeding] : no bleeding [Hand well perfused] : hand well perfused [Normal] : coordination grossly intact, no focal deficits

## 2019-10-04 NOTE — REASON FOR VISIT
[Follow-Up Visit] : a follow-up visit for [Prolonged Bleeding] : prolonged bleeding [FreeTextEntry2] : Stenosis of right arm AV fistula

## 2019-10-29 NOTE — DISCUSSION/SUMMARY
[FSBS] : FSBS [Post Fistulogram/Intervention] : Post Fistulogram/Intervention: Site check for bleeding/hematoma, thrill/bruit. Vitals signs: On admission, then q15 mins x 2 [Other: _____] : [unfilled] [Resume diet] : resume diet

## 2019-10-30 ENCOUNTER — APPOINTMENT (OUTPATIENT)
Dept: ENDOVASCULAR SURGERY | Facility: CLINIC | Age: 59
End: 2019-10-30

## 2019-10-30 ENCOUNTER — APPOINTMENT (OUTPATIENT)
Dept: VASCULAR SURGERY | Facility: CLINIC | Age: 59
End: 2019-10-30
Payer: MEDICAID

## 2019-10-30 DIAGNOSIS — M81.0 AGE-RELATED OSTEOPOROSIS W/OUT CURRENT PATHOLOGICAL FRACTURE: ICD-10-CM

## 2019-10-30 DIAGNOSIS — I25.10 ATHEROSCLEROTIC HEART DISEASE OF NATIVE CORONARY ARTERY W/OUT ANGINA PECTORIS: ICD-10-CM

## 2019-10-30 DIAGNOSIS — E21.3 HYPERPARATHYROIDISM, UNSPECIFIED: ICD-10-CM

## 2019-10-30 DIAGNOSIS — E11.42 TYPE 2 DIABETES MELLITUS WITH DIABETIC POLYNEUROPATHY: ICD-10-CM

## 2019-10-30 PROCEDURE — 93923 UPR/LXTR ART STDY 3+ LVLS: CPT

## 2019-10-30 RX ORDER — NORTRIPTYLINE HYDROCHLORIDE 10 MG/1
10 CAPSULE ORAL
Refills: 0 | Status: ACTIVE | COMMUNITY

## 2019-10-30 RX ORDER — IRON/IRON ASP GLY/FA/MV-MIN 38 125-25-1MG
TABLET ORAL
Refills: 0 | Status: ACTIVE | COMMUNITY

## 2019-10-30 RX ORDER — GABAPENTIN 100 MG/1
100 CAPSULE ORAL
Refills: 0 | Status: ACTIVE | COMMUNITY

## 2019-10-30 RX ORDER — FOLIC ACID 1 MG/1
1 TABLET ORAL
Refills: 0 | Status: ACTIVE | COMMUNITY

## 2019-10-30 RX ORDER — ISOSORBIDE MONONITRATE 60 MG
60 TABLET, EXTENDED RELEASE 24 HR ORAL
Refills: 0 | Status: ACTIVE | COMMUNITY

## 2019-10-30 RX ORDER — NIFEDIPINE 20 MG/1
CAPSULE ORAL
Refills: 0 | Status: DISCONTINUED | COMMUNITY
End: 2019-10-30

## 2019-10-30 RX ORDER — SUCRALFATE 1 G/1
1 TABLET ORAL
Refills: 0 | Status: ACTIVE | COMMUNITY

## 2019-11-01 NOTE — HISTORY OF PRESENT ILLNESS
[] : right radiocephalic fistula [FreeTextEntry1] : creation 11-18-18 Dr Mason [FreeTextEntry4] : yesterday [FreeTextEntry5] : last night [FreeTextEntry6] : Dr Faith

## 2019-11-01 NOTE — PAST MEDICAL HISTORY
[Increasing age ( >40 years old)] : Increasing age ( >40 years old) [No therapy indicated for cases scheduled for less than one hour] : No therapy indicated for cases scheduled for less than one hour. [FreeTextEntry1] : Malignant Hyperthermia Screening Tool and Risk of Bleeding Assessment \par \par Mr. KIAN VALERO denies family of unexpected death following Anesthesia or Exercise.\par Denies Family history of Malignant Hyperthermia, Muscle or Neuromuscular disorder and High Temperature  following exercise.\par \par Mr. KIAN VALERO denies history of Muscle Spasm, Dark or Chocolate- Colored and Unanticipated fever immediately following anaesthesia or serious exercise.\par Mr. KIAN VALERO also denies bleeding tendencies/Risks of Bleeding.\par

## 2019-11-01 NOTE — REASON FOR VISIT
[Other ___] : a [unfilled] visit for [Prolonged Bleeding] : prolonged bleeding [Formal Caregiver] : formal caregiver [FreeTextEntry2] : stenosis on duplex

## 2019-11-08 NOTE — PRE-OP CHECKLIST - HEIGHT IN INCHES
Sandra Sykes    ED Visit Information     Ed visit date:11/04/2019  Diagnosis Description: Groin strain  In Network? Yes Whitney Albino  Discharge status: Home  Discharged with meds ? Yes  Number of ED visits to date: 4 (2 ED to admission)  ED Severity:n/a     Outreach Information    Outreach successful: Yes 1  Date letter mailed:n/a  Date Finalized:11/08/2019    Care Coordination    Follow up appointment with pcp: no Declined  Transportation issues ? No    Value [employer] type:  7 Day Outreach  Care Coordination Status:  Referred to [facility]  Emergent necessity warranted by diagnosis:  No  [facility] PCP:  Yes  Transportation:  Self Transport  Called PCP first?:  No  Feels able to call PCP for urgent problems ?:  Yes  Understands what emergencies can be handled by PCP ?:  Yes  Ever any problems getting appointment with PCP for minor emergency/urgency problems?:  No  Practice Contacted Patient ?:  No  Pt had ED follow up with pcp/staff ?:  No    Seen for follow-up out of network ?:  No  Reason Patient went to ED instead of Urgent Care or PCP?:  Perceived Severity of Illness  Urgent care Education?:  Yes  11/08/2019 01:24 PM Phone (Chelsea Robins) Marisel Schumacher (Self) [phone] (H)   Left Message  Personal communication with patient regarding recent ED visit on 11/4 for Groin strain  Patient was discharged without medication  (diclofenac)  Patient stated that he is feeling better  Margarita Mai declined to schedule a follow up appt at this time  Patient mets OPCM criteria  Patient was not aware of PCP after hours on-service, education given  Patient is aware of his nearest [place] [address] urgent care facility and what conditions may be treated there, education given 
7

## 2019-12-03 NOTE — ED PROVIDER NOTE - INPATIENT RESIDENT/ACP NOTIFIED
"   12/02/19 1226   Child Life   Location Surgery  (Hernia Repair)   Intervention Family Support;Supportive Check In   Preparation Comment Introduced self and CFL services.  Pt appeared alert and playful in preop today.  Escorted pt and family to preop playroom.  Per pt, \"I remember the mask.\"  Pt's mother reported that pt had a sedated procedure a few months ago.  Provided pt with scented chapstick choices for pt's anesthesia mask.   Family Support Comment Pt's mother and father present.   Anxiety Appropriate   Major Change/Loss/Stressor/Fears surgery/procedure   Techniques to [place] with Loss/Stress/Change family presence;exercise/play   Outcomes/Follow Up Provided Materials     " Dr. Marti

## 2019-12-03 NOTE — PATIENT PROFILE ADULT - STATED REASON FOR ADMISSION
Requested Prescriptions     Pending Prescriptions Disp Refills   • sulfaSALAzine 500 MG Oral Tab 120 tablet 1     Sig: Take 2 tablets (1,000 mg total) by mouth 2 (two) times daily.  With meals     Last filled:10/21/19 #120 tab w/ 1 rf  LOV: 10/21/19  Future ALKALINE PHOSPHATASE      37 - 98 U/L 62   Total Bilirubin      0.1 - 2.0 mg/dL 0.3   TOTAL PROTEIN      6.4 - 8.2 g/dL 7.9   Albumin      3.4 - 5.0 g/dL 3.5   Globulin      2.8 - 4.4 g/dL 4.4   A/G Ratio      1.0 - 2.0 0.8 (L)   Patient Fasting?        Tony Angeles jenn

## 2019-12-23 ENCOUNTER — APPOINTMENT (OUTPATIENT)
Dept: VASCULAR SURGERY | Facility: CLINIC | Age: 59
End: 2019-12-23
Payer: MEDICAID

## 2019-12-23 PROCEDURE — 99213 OFFICE O/P EST LOW 20 MIN: CPT

## 2019-12-23 PROCEDURE — 93990 DOPPLER FLOW TESTING: CPT

## 2019-12-23 NOTE — PHYSICAL EXAM
[Thrill] : thrill [Bleeding] : no bleeding [Hand well perfused] : hand well perfused [Aneurysm] : aneurysm [Normal] : normoactive bowel sounds, soft and nontender, no hepatosplenomegaly or masses appreciated

## 2019-12-23 NOTE — ASSESSMENT
[FreeTextEntry1] : Patient had moderate stenosis of right brachiocephalic fistula with no clinical difficulty during the dialysis. Recommend conservative management with followup in 6-8 weeks.

## 2019-12-30 ENCOUNTER — APPOINTMENT (OUTPATIENT)
Dept: UROLOGY | Facility: CLINIC | Age: 59
End: 2019-12-30

## 2019-12-31 NOTE — PATIENT PROFILE ADULT - NSTOBACCONEVERSMOKERY/N_GEN_A
Spoke to Gregorio again at [facility], he was able to provide me with a list of the pt's home meds, he said according to their records it looks like he hasn't taken meds since 12/26. He reported that it was very unlikely that patient has done any kind of drugs and that they drug test once a week and he has never tested positive.        Aicha Bergeron RN  12/31/19 7021 Yes

## 2020-01-02 ENCOUNTER — APPOINTMENT (OUTPATIENT)
Dept: UROLOGY | Facility: CLINIC | Age: 60
End: 2020-01-02
Payer: MEDICAID

## 2020-01-02 VITALS
HEART RATE: 88 BPM | SYSTOLIC BLOOD PRESSURE: 108 MMHG | HEIGHT: 63 IN | WEIGHT: 133 LBS | DIASTOLIC BLOOD PRESSURE: 66 MMHG | BODY MASS INDEX: 23.57 KG/M2

## 2020-01-02 DIAGNOSIS — F17.200 NICOTINE DEPENDENCE, UNSPECIFIED, UNCOMPLICATED: ICD-10-CM

## 2020-01-02 DIAGNOSIS — Z86.39 PERSONAL HISTORY OF OTHER ENDOCRINE, NUTRITIONAL AND METABOLIC DISEASE: ICD-10-CM

## 2020-01-02 DIAGNOSIS — Z78.9 OTHER SPECIFIED HEALTH STATUS: ICD-10-CM

## 2020-01-02 PROCEDURE — 99205 OFFICE O/P NEW HI 60 MIN: CPT | Mod: 25

## 2020-01-02 PROCEDURE — 99407 BEHAV CHNG SMOKING > 10 MIN: CPT

## 2020-01-02 NOTE — REVIEW OF SYSTEMS
[see HPI] : see HPI [Earache] : earache [Eyesight Problems] : eyesight problems [Told you have blood in urine on a urine test] : told blood was present in a urine test [Blood in urine that you can see] : blood visible in urine [Pain during urination] : pain during urination [Difficulty Walking] : difficulty walking [Negative] : Heme/Lymph

## 2020-01-05 PROBLEM — F17.200 CURRENT EVERY DAY SMOKER: Status: ACTIVE | Noted: 2018-03-19

## 2020-01-05 PROBLEM — Z78.9 NO FAMILY HISTORY OF KIDNEY DISEASE: Status: ACTIVE | Noted: 2020-01-02

## 2020-01-05 NOTE — ASSESSMENT
[FreeTextEntry1] : 58 yo M with gross hematuria\par \par - Discussed possible etiologies for hematuria including benign (UTI, nephrolithiasis, cyst, BPH) vs malignancy (renal, ureteral and bladder). Discussed hematuria workup which includes CT urogram and cystoscopy. Discussed risk and benefits of cystoscopy.\par - Will coordinate with Mateus Murray regarding CT urogram to time with his dialysis\par - Spent >10 min reviewing tobacco cessation and its association with bladder and renal malignancy

## 2020-01-05 NOTE — PHYSICAL EXAM
[General Appearance - Well Developed] : well developed [General Appearance - Well Nourished] : well nourished [Normal Appearance] : normal appearance [Well Groomed] : well groomed [Edema] : no peripheral edema [General Appearance - In No Acute Distress] : no acute distress [Respiration, Rhythm And Depth] : normal respiratory rhythm and effort [Exaggerated Use Of Accessory Muscles For Inspiration] : no accessory muscle use [Abdomen Soft] : soft [Abdomen Tenderness] : non-tender [Urethral Meatus] : meatus normal [Costovertebral Angle Tenderness] : no ~M costovertebral angle tenderness [Urinary Bladder Findings] : the bladder was normal on palpation [Epididymis] : the epididymides were normal [Testes Tenderness] : no tenderness of the testes [Scrotum] : the scrotum was normal [No Prostate Nodules] : no prostate nodules [Prostate Tenderness] : the prostate was not tender [Testes Mass (___cm)] : there were no testicular masses [Prostate Size ___ gm] : prostate size [unfilled] gm [FreeTextEntry1] : Uses walker, history of leg amputation [No Focal Deficits] : no focal deficits [] : no rash [Oriented To Time, Place, And Person] : oriented to person, place, and time [Affect] : the affect was normal [Not Anxious] : not anxious [Mood] : the mood was normal [No Palpable Adenopathy] : no palpable adenopathy

## 2020-01-05 NOTE — REASON FOR VISIT
[Initial Visit ___] : [unfilled] is here today for an initial visit  for [unfilled] [Formal Caregiver] : formal caregiver

## 2020-01-05 NOTE — HISTORY OF PRESENT ILLNESS
[FreeTextEntry1] : 58 yo M presents with 5 month history of intermittent gross hematuria\par Sometimes dysuria\par History of ESRD and on HD for the last 8 months\par Makes about a cup of urine per day\par No prior history of stones, UTI or prostate issues

## 2020-01-06 NOTE — PHYSICAL THERAPY INITIAL EVALUATION ADULT - LEVEL OF INDEPENDENCE: GAIT, REHAB EVAL
DATE:  1/6/2020   TIME OF RECEIPT FROM LAB:  11:48   LAB TEST:  Hematocrit = 19.8     Hbg = 6.4   WBC = 300  RESULTS GIVEN WITH READ-BACK TO (PROVIDER):  Given to Janie Davalos LPN @ 11:54  TIME LAB VALUE REPORTED TO PROVIDER:   Sent Encounter to Juliet @ 11:56    
RNCC spoke with Hussain. He reports he has had some lightheadness and felt a little dizzy today. He denies headache or shortness of breath. He is proceeding to the nearest ED which is [facility]. His mother will provide his ride to the ED for a blood transfusion. Hussain verbalizes understanding.   
supervision

## 2020-01-09 NOTE — ED ADULT NURSE REASSESSMENT NOTE - COMFORT CARE
Instructions: This plan will send the code FBSD to the PM system.  DO NOT or CHANGE the price. side rails up/wait time explained/plan of care explained Detail Level: Simple Price (Do Not Change): 0.00

## 2020-01-13 ENCOUNTER — APPOINTMENT (OUTPATIENT)
Age: 60
End: 2020-01-13
Payer: MEDICAID

## 2020-01-13 VITALS — BODY MASS INDEX: 21.62 KG/M2 | HEIGHT: 63 IN | WEIGHT: 122 LBS | RESPIRATION RATE: 17 BRPM

## 2020-01-13 DIAGNOSIS — N32.89 OTHER SPECIFIED DISORDERS OF BLADDER: ICD-10-CM

## 2020-01-13 PROCEDURE — 52000 CYSTOURETHROSCOPY: CPT

## 2020-01-13 PROCEDURE — 99214 OFFICE O/P EST MOD 30 MIN: CPT | Mod: 25

## 2020-01-15 LAB — BACTERIA UR CULT: ABNORMAL

## 2020-01-16 PROBLEM — N32.89 BLADDER MASS: Status: ACTIVE | Noted: 2020-01-16

## 2020-01-17 LAB
APPEARANCE: ABNORMAL
BACTERIA: NEGATIVE
BILIRUBIN URINE: NEGATIVE
BLOOD URINE: NEGATIVE
COLOR: ABNORMAL
GLUCOSE QUALITATIVE U: NEGATIVE
HYALINE CASTS: 1 /LPF
KETONES URINE: NEGATIVE
LEUKOCYTE ESTERASE URINE: ABNORMAL
MICROSCOPIC-UA: NORMAL
NITRITE URINE: NEGATIVE
PH URINE: 6.5
PROTEIN URINE: ABNORMAL
RED BLOOD CELLS URINE: 2 /HPF
SPECIFIC GRAVITY URINE: 1
SQUAMOUS EPITHELIAL CELLS: 0 /HPF
URINE CYTOLOGY: NORMAL
UROBILINOGEN URINE: NORMAL
WHITE BLOOD CELLS URINE: 250 /HPF

## 2020-01-17 NOTE — HISTORY OF PRESENT ILLNESS
[FreeTextEntry1] : 58 yo M presents with 5 month history of intermittent gross hematuria\par Sometimes dysuria\par History of ESRD and on HD for the last 8 months\par Makes about a cup of urine per day\par No prior history of stones, UTI or prostate issues\par \par 1/13/20 Interval history: Pt continues to have intermittent gross hematuria\par Cysto today showed abnormal bladder mucosa

## 2020-01-17 NOTE — ASSESSMENT
[FreeTextEntry1] : 60 yo M with gross hematuria, abnormal cysto\par \par - Reviewed cysto findings with pt\par - Discussed pros and cons of TURBT. Pt eager to proceed\par - CT urogram still pending. Will follow-up with his assisted living home regarding this

## 2020-01-17 NOTE — PHYSICAL EXAM
[General Appearance - Well Developed] : well developed [General Appearance - Well Nourished] : well nourished [Normal Appearance] : normal appearance [Well Groomed] : well groomed [General Appearance - In No Acute Distress] : no acute distress [Abdomen Soft] : soft [Abdomen Tenderness] : non-tender [Costovertebral Angle Tenderness] : no ~M costovertebral angle tenderness [Urethral Meatus] : meatus normal [Urinary Bladder Findings] : the bladder was normal on palpation [Scrotum] : the scrotum was normal [Epididymis] : the epididymides were normal [Testes Tenderness] : no tenderness of the testes [Testes Mass (___cm)] : there were no testicular masses [Prostate Tenderness] : the prostate was not tender [No Prostate Nodules] : no prostate nodules [Prostate Size ___ gm] : prostate size [unfilled] gm [Edema] : no peripheral edema [] : no respiratory distress [Exaggerated Use Of Accessory Muscles For Inspiration] : no accessory muscle use [Respiration, Rhythm And Depth] : normal respiratory rhythm and effort [Oriented To Time, Place, And Person] : oriented to person, place, and time [Affect] : the affect was normal [Mood] : the mood was normal [Not Anxious] : not anxious [FreeTextEntry1] : Uses walker, history of leg amputation [No Focal Deficits] : no focal deficits [No Palpable Adenopathy] : no palpable adenopathy

## 2020-02-05 ENCOUNTER — OUTPATIENT (OUTPATIENT)
Dept: OUTPATIENT SERVICES | Facility: HOSPITAL | Age: 60
LOS: 1 days | End: 2020-02-05
Payer: MEDICAID

## 2020-02-05 VITALS
RESPIRATION RATE: 18 BRPM | WEIGHT: 117.95 LBS | HEIGHT: 62 IN | HEART RATE: 88 BPM | SYSTOLIC BLOOD PRESSURE: 90 MMHG | DIASTOLIC BLOOD PRESSURE: 57 MMHG | OXYGEN SATURATION: 96 %

## 2020-02-05 DIAGNOSIS — I10 ESSENTIAL (PRIMARY) HYPERTENSION: ICD-10-CM

## 2020-02-05 DIAGNOSIS — N32.89 OTHER SPECIFIED DISORDERS OF BLADDER: ICD-10-CM

## 2020-02-05 DIAGNOSIS — Z01.818 ENCOUNTER FOR OTHER PREPROCEDURAL EXAMINATION: ICD-10-CM

## 2020-02-05 DIAGNOSIS — Z98.42 CATARACT EXTRACTION STATUS, LEFT EYE: Chronic | ICD-10-CM

## 2020-02-05 DIAGNOSIS — E11.9 TYPE 2 DIABETES MELLITUS WITHOUT COMPLICATIONS: ICD-10-CM

## 2020-02-05 DIAGNOSIS — N18.6 END STAGE RENAL DISEASE: ICD-10-CM

## 2020-02-05 DIAGNOSIS — Z89.512 ACQUIRED ABSENCE OF LEFT LEG BELOW KNEE: Chronic | ICD-10-CM

## 2020-02-05 DIAGNOSIS — Z98.890 OTHER SPECIFIED POSTPROCEDURAL STATES: Chronic | ICD-10-CM

## 2020-02-05 DIAGNOSIS — Z98.41 CATARACT EXTRACTION STATUS, RIGHT EYE: Chronic | ICD-10-CM

## 2020-02-05 LAB
ANION GAP SERPL CALC-SCNC: 8 MMOL/L — SIGNIFICANT CHANGE UP (ref 5–17)
BLD GP AB SCN SERPL QL: SIGNIFICANT CHANGE UP
BUN SERPL-MCNC: 23 MG/DL — HIGH (ref 7–18)
CALCIUM SERPL-MCNC: 8.2 MG/DL — LOW (ref 8.4–10.5)
CHLORIDE SERPL-SCNC: 100 MMOL/L — SIGNIFICANT CHANGE UP (ref 96–108)
CO2 SERPL-SCNC: 30 MMOL/L — SIGNIFICANT CHANGE UP (ref 22–31)
CREAT SERPL-MCNC: 8.11 MG/DL — HIGH (ref 0.5–1.3)
GLUCOSE SERPL-MCNC: 135 MG/DL — HIGH (ref 70–99)
HCT VFR BLD CALC: 40.7 % — SIGNIFICANT CHANGE UP (ref 39–50)
HGB BLD-MCNC: 12.2 G/DL — LOW (ref 13–17)
MCHC RBC-ENTMCNC: 28.1 PG — SIGNIFICANT CHANGE UP (ref 27–34)
MCHC RBC-ENTMCNC: 30 GM/DL — LOW (ref 32–36)
MCV RBC AUTO: 93.8 FL — SIGNIFICANT CHANGE UP (ref 80–100)
NRBC # BLD: 0 /100 WBCS — SIGNIFICANT CHANGE UP (ref 0–0)
PLATELET # BLD AUTO: 220 K/UL — SIGNIFICANT CHANGE UP (ref 150–400)
POTASSIUM SERPL-MCNC: 3.7 MMOL/L — SIGNIFICANT CHANGE UP (ref 3.5–5.3)
POTASSIUM SERPL-SCNC: 3.7 MMOL/L — SIGNIFICANT CHANGE UP (ref 3.5–5.3)
RBC # BLD: 4.34 M/UL — SIGNIFICANT CHANGE UP (ref 4.2–5.8)
RBC # FLD: 22.2 % — HIGH (ref 10.3–14.5)
SODIUM SERPL-SCNC: 138 MMOL/L — SIGNIFICANT CHANGE UP (ref 135–145)
WBC # BLD: 4.12 K/UL — SIGNIFICANT CHANGE UP (ref 3.8–10.5)
WBC # FLD AUTO: 4.12 K/UL — SIGNIFICANT CHANGE UP (ref 3.8–10.5)

## 2020-02-05 PROCEDURE — G0463: CPT

## 2020-02-05 RX ORDER — NIFEDIPINE 30 MG
1 TABLET, EXTENDED RELEASE 24 HR ORAL
Qty: 0 | Refills: 0 | DISCHARGE

## 2020-02-05 NOTE — H&P PST ADULT - ATTENDING COMMENTS
Procedure was rescheduled secondary to hyperglycemia and hyperkalemia. Normal potassium and glucose today and OK to proceed.

## 2020-02-05 NOTE — H&P PST ADULT - NSICDXPASTMEDICALHX_GEN_ALL_CORE_FT
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

## 2020-02-05 NOTE — H&P PST ADULT - NEGATIVE GASTROINTESTINAL SYMPTOMS
no vomiting/no constipation/no change in bowel habits/no nausea/no abdominal pain/no flatulence/no diarrhea

## 2020-02-05 NOTE — H&P PST ADULT - RS GEN PE MLT RESP DETAILS PC
normal/no intercostal retractions/no rales/respirations non-labored/airway patent/clear to auscultation bilaterally/no wheezes/no chest wall tenderness/no rhonchi/no subcutaneous emphysema/breath sounds equal/good air movement

## 2020-02-05 NOTE — H&P PST ADULT - NSICDXPROBLEM_GEN_ALL_CORE_FT
PROBLEM DIAGNOSES  Problem: ESRD on hemodialysis  Assessment and Plan: Patient instructed to get hemodiyalis one day before surgery.    Problem: Other specified disorders of bladder  Assessment and Plan: Patient scheduled for cystoscopy on 2/11/20. Preop instructions given.    Problem: Diabetes mellitus  Assessment and Plan: Patient instructed ti hold insulin injections on the day of surgery.    Problem: HTN (hypertension)  Assessment and Plan: Patient instructed to continue antyhypertensive medications.

## 2020-02-05 NOTE — H&P PST ADULT - NSICDXPASTSURGICALHX_GEN_ALL_CORE_FT
PAST SURGICAL HISTORY:  Below knee amputation status, left     History of left cataract extraction     History of right cataract extraction PAST SURGICAL HISTORY:  Below knee amputation status, left     History of left cataract extraction     History of right cataract extraction     S/P arteriovenous (AV) fistula creation 2018

## 2020-02-05 NOTE — H&P PST ADULT - HISTORY OF PRESENT ILLNESS
58 yr old male with PMH of HTN, DM, diabetic retinopathy, glaucoma, osteoporosis, OA, LS spinal stenosis, PVD, hyperparathyroidism, PVD presents with c/o CKD. Pt for right arm brachiocephalic arteriovenous fistula on 11/08/2018 in anticipation of hemodialysis. 59 yr old male with PMH of HTN, DM, diabetic retinopathy, glaucoma, osteoporosis, OA, LS spinal stenosis, PVD, hyperparathyroidism, PVD presents with c/o CKD. Pt for right arm brachiocephalic arteriovenous fistula on 11/08/2018 in anticipation of hemodialysis. 59 yr old male with PMH of HTN, DM, diabetic retinopathy, glaucoma, osteoporosis, OA, LS spinal stenosis, PVD, hyperparathyroidism, PVD, ESRD on HD presents today to PST for presurgical evaluation. Patient c/o hematuria for x 2 months and is now he scheduled for cystoscopy, transurethral resection of bladder tumor on 2/11/20.

## 2020-02-05 NOTE — H&P PST ADULT - ASSESSMENT
58 yr old male with PMH of HTN, DM, diabetic retinopathy, glaucoma, osteoporosis, OA, LS spinal stenosis, PVD, hyperparathyroidism, PVD presents with CKD. Pt for right arm brachiocephalic arteriovenous fistula on 11/08/2018 in anticipation of hemodialysis. 59 yr old male with PMH of HTN, DM, diabetic retinopathy, glaucoma, osteoporosis, OA, LS spinal stenosis, PVD, hyperparathyroidism, PVD, ESRD on HD scheduled for TURP

## 2020-02-11 ENCOUNTER — EMERGENCY (EMERGENCY)
Facility: HOSPITAL | Age: 60
LOS: 1 days | Discharge: HOME | End: 2020-02-11
Attending: EMERGENCY MEDICINE
Payer: MEDICAID

## 2020-02-11 VITALS
SYSTOLIC BLOOD PRESSURE: 190 MMHG | HEART RATE: 106 BPM | DIASTOLIC BLOOD PRESSURE: 87 MMHG | OXYGEN SATURATION: 96 % | RESPIRATION RATE: 17 BRPM | HEIGHT: 62 IN | TEMPERATURE: 98 F

## 2020-02-11 DIAGNOSIS — Z98.41 CATARACT EXTRACTION STATUS, RIGHT EYE: Chronic | ICD-10-CM

## 2020-02-11 DIAGNOSIS — Z98.890 OTHER SPECIFIED POSTPROCEDURAL STATES: Chronic | ICD-10-CM

## 2020-02-11 DIAGNOSIS — Z89.512 ACQUIRED ABSENCE OF LEFT LEG BELOW KNEE: Chronic | ICD-10-CM

## 2020-02-11 DIAGNOSIS — Z98.42 CATARACT EXTRACTION STATUS, LEFT EYE: Chronic | ICD-10-CM

## 2020-02-11 LAB
ALBUMIN SERPL ELPH-MCNC: 3.2 G/DL — LOW (ref 3.5–5)
ALP SERPL-CCNC: 130 U/L — HIGH (ref 40–120)
ALT FLD-CCNC: 15 U/L DA — SIGNIFICANT CHANGE UP (ref 10–60)
ANION GAP SERPL CALC-SCNC: 8 MMOL/L — SIGNIFICANT CHANGE UP (ref 5–17)
AST SERPL-CCNC: 6 U/L — LOW (ref 10–40)
BASOPHILS # BLD AUTO: 0.02 K/UL — SIGNIFICANT CHANGE UP (ref 0–0.2)
BASOPHILS NFR BLD AUTO: 0.5 % — SIGNIFICANT CHANGE UP (ref 0–2)
BILIRUB SERPL-MCNC: 0.4 MG/DL — SIGNIFICANT CHANGE UP (ref 0.2–1.2)
BUN SERPL-MCNC: 28 MG/DL — HIGH (ref 7–18)
CALCIUM SERPL-MCNC: 8.7 MG/DL — SIGNIFICANT CHANGE UP (ref 8.4–10.5)
CHLORIDE SERPL-SCNC: 104 MMOL/L — SIGNIFICANT CHANGE UP (ref 96–108)
CO2 SERPL-SCNC: 25 MMOL/L — SIGNIFICANT CHANGE UP (ref 22–31)
CREAT SERPL-MCNC: 8.39 MG/DL — HIGH (ref 0.5–1.3)
EOSINOPHIL # BLD AUTO: 0.07 K/UL — SIGNIFICANT CHANGE UP (ref 0–0.5)
EOSINOPHIL NFR BLD AUTO: 1.6 % — SIGNIFICANT CHANGE UP (ref 0–6)
GLUCOSE SERPL-MCNC: 65 MG/DL — LOW (ref 70–99)
HCT VFR BLD CALC: 45 % — SIGNIFICANT CHANGE UP (ref 39–50)
HGB BLD-MCNC: 13.3 G/DL — SIGNIFICANT CHANGE UP (ref 13–17)
IMM GRANULOCYTES NFR BLD AUTO: 0.2 % — SIGNIFICANT CHANGE UP (ref 0–1.5)
INR BLD: 0.97 RATIO — SIGNIFICANT CHANGE UP (ref 0.88–1.16)
LYMPHOCYTES # BLD AUTO: 0.44 K/UL — LOW (ref 1–3.3)
LYMPHOCYTES # BLD AUTO: 10.2 % — LOW (ref 13–44)
MCHC RBC-ENTMCNC: 28.1 PG — SIGNIFICANT CHANGE UP (ref 27–34)
MCHC RBC-ENTMCNC: 29.6 GM/DL — LOW (ref 32–36)
MCV RBC AUTO: 94.9 FL — SIGNIFICANT CHANGE UP (ref 80–100)
MONOCYTES # BLD AUTO: 0.57 K/UL — SIGNIFICANT CHANGE UP (ref 0–0.9)
MONOCYTES NFR BLD AUTO: 13.2 % — SIGNIFICANT CHANGE UP (ref 2–14)
NEUTROPHILS # BLD AUTO: 3.22 K/UL — SIGNIFICANT CHANGE UP (ref 1.8–7.4)
NEUTROPHILS NFR BLD AUTO: 74.3 % — SIGNIFICANT CHANGE UP (ref 43–77)
NRBC # BLD: 0 /100 WBCS — SIGNIFICANT CHANGE UP (ref 0–0)
PLATELET # BLD AUTO: 115 K/UL — LOW (ref 150–400)
POTASSIUM SERPL-MCNC: 4.5 MMOL/L — SIGNIFICANT CHANGE UP (ref 3.5–5.3)
POTASSIUM SERPL-SCNC: 4.5 MMOL/L — SIGNIFICANT CHANGE UP (ref 3.5–5.3)
PROT SERPL-MCNC: 7.7 G/DL — SIGNIFICANT CHANGE UP (ref 6–8.3)
PROTHROM AB SERPL-ACNC: 10.7 SEC — SIGNIFICANT CHANGE UP (ref 10–12.9)
RBC # BLD: 4.74 M/UL — SIGNIFICANT CHANGE UP (ref 4.2–5.8)
RBC # FLD: 21.2 % — HIGH (ref 10.3–14.5)
SODIUM SERPL-SCNC: 137 MMOL/L — SIGNIFICANT CHANGE UP (ref 135–145)
WBC # BLD: 4.33 K/UL — SIGNIFICANT CHANGE UP (ref 3.8–10.5)
WBC # FLD AUTO: 4.33 K/UL — SIGNIFICANT CHANGE UP (ref 3.8–10.5)

## 2020-02-11 PROCEDURE — 82962 GLUCOSE BLOOD TEST: CPT

## 2020-02-11 PROCEDURE — 93005 ELECTROCARDIOGRAM TRACING: CPT

## 2020-02-11 PROCEDURE — 99291 CRITICAL CARE FIRST HOUR: CPT | Mod: 25

## 2020-02-11 PROCEDURE — 85610 PROTHROMBIN TIME: CPT

## 2020-02-11 PROCEDURE — 96374 THER/PROPH/DIAG INJ IV PUSH: CPT

## 2020-02-11 PROCEDURE — 36415 COLL VENOUS BLD VENIPUNCTURE: CPT

## 2020-02-11 PROCEDURE — 85027 COMPLETE CBC AUTOMATED: CPT

## 2020-02-11 PROCEDURE — 80053 COMPREHEN METABOLIC PANEL: CPT

## 2020-02-11 PROCEDURE — 99291 CRITICAL CARE FIRST HOUR: CPT

## 2020-02-11 RX ORDER — DEXTROSE 50 % IN WATER 50 %
50 SYRINGE (ML) INTRAVENOUS ONCE
Refills: 0 | Status: COMPLETED | OUTPATIENT
Start: 2020-02-11 | End: 2020-02-11

## 2020-02-11 RX ADMIN — Medication 50 MILLILITER(S): at 12:17

## 2020-02-11 NOTE — ED PROVIDER NOTE - CLINICAL SUMMARY MEDICAL DECISION MAKING FREE TEXT BOX
59 yr old male from [facility] with PMH of HTN, DM, diabetic retinopathy, glaucoma, osteoporosis, OA, LS spinal stenosis, PVD, hyperparathyroidism, PVD, ESRD on HD presents, hematuria for x 2 months and is now he scheduled for cystoscopy, transurethral resection of bladder tumor on 2/11/20 presents to ed for ams today while at [facility].  pt while at surgical clinic states he just felt sluggist and ate a cracker and small juice.  fs 918 clinic gave 8 units sq insulin then another 8 units.  pt became confused and ams.  in ed-36.  diaphoretic.  labs reviewed and K was 6.0 hemolyzed.      ams likely due to over administration of insulin.  pt given dextrose,  K is hemolyzed and was dialyzed Monday. labs, admit

## 2020-02-11 NOTE — ED ADULT TRIAGE NOTE - SOURCE OF INFORMATION
10/30/17    [facility]   [address] [place] [address]  [phone]                             Regarding Patient:  Ernestine Fletcher  [address] [id number]    To Whom It Concern:    This is to certify Ernestine Fletcher was evaluated with Herb Umanzor MD on 10/30/17.  She was provided with the following work recommendations:     May return to work on November 7, 2017.  No restrictions.  Additional Comments or Recommendations:  Please feel free to call with any Q's or concerns.    Thank-you,      ____________________________  Herb Umanzor MD                  
aid

## 2020-02-11 NOTE — ED PROVIDER NOTE - PATIENT PORTAL LINK FT
You can access the FollowMyHealth Patient Portal offered by [facility] by registering at the following website: [email or web address]. By joining [employer]’s FollowMyHealth portal, you will also be able to view your health information using other applications (apps) compatible with our system.

## 2020-02-11 NOTE — ED PROVIDER NOTE - OBJECTIVE STATEMENT
59 yr old male from [facility] with PMH of HTN, DM, diabetic retinopathy, glaucoma, osteoporosis, OA, LS spinal stenosis, PVD, hyperparathyroidism, PVD, ESRD on HD presents, hematuria for x 2 months and is now he scheduled for cystoscopy, transurethral resection of bladder tumor on 2/11/20 presents to ed for ams today while at [facility].  pt while at surgical clinic states he just felt sluggist and ate a cracker and small juice.  fs 918 clinic gave 8 units sq insulin then another 8 units.  pt became confused and ams.  in ed-36.  diaphoretic.  labs reviewed and K was 6.0 hemolyzed.

## 2020-02-11 NOTE — ED PROVIDER NOTE - PROGRESS NOTE DETAILS
Grimaldo: work up normal K.  pt asx.  neurologically at baseline.  pt fs stable. ate lunch in ed.  spoke with Dr Atwood, urologist and will reschedule.  dx hypoglycemia due to over administration of insulin.  ambulette to home.

## 2020-02-11 NOTE — ED ADULT TRIAGE NOTE - CHIEF COMPLAINT QUOTE
transferred from ambulatory surgery for elevated potasium level, as per [facility] pratik patient is not his normal self and confused since 9am today.

## 2020-02-11 NOTE — ED PROVIDER NOTE - ENMT, MLM
Airway patent, Nasal mucosa clear. Mouth with normal mucosa. Throat has no vesicles, no oropharyngeal exudates and uvula is midline. diaphoretic

## 2020-02-24 ENCOUNTER — TRANSCRIPTION ENCOUNTER (OUTPATIENT)
Age: 60
End: 2020-02-24

## 2020-02-24 RX ORDER — SODIUM CHLORIDE 9 MG/ML
3 INJECTION INTRAMUSCULAR; INTRAVENOUS; SUBCUTANEOUS EVERY 8 HOURS
Refills: 0 | Status: DISCONTINUED | OUTPATIENT
Start: 2020-02-25 | End: 2020-02-25

## 2020-02-25 ENCOUNTER — APPOINTMENT (OUTPATIENT)
Age: 60
End: 2020-02-25

## 2020-02-25 ENCOUNTER — OUTPATIENT (OUTPATIENT)
Dept: OUTPATIENT SERVICES | Facility: HOSPITAL | Age: 60
LOS: 1 days | End: 2020-02-25
Payer: MEDICAID

## 2020-02-25 ENCOUNTER — RESULT REVIEW (OUTPATIENT)
Age: 60
End: 2020-02-25

## 2020-02-25 VITALS
RESPIRATION RATE: 12 BRPM | DIASTOLIC BLOOD PRESSURE: 84 MMHG | TEMPERATURE: 98 F | OXYGEN SATURATION: 95 % | HEART RATE: 89 BPM | SYSTOLIC BLOOD PRESSURE: 151 MMHG

## 2020-02-25 VITALS
RESPIRATION RATE: 18 BRPM | OXYGEN SATURATION: 96 % | DIASTOLIC BLOOD PRESSURE: 57 MMHG | HEIGHT: 62 IN | SYSTOLIC BLOOD PRESSURE: 90 MMHG | WEIGHT: 117.95 LBS | HEART RATE: 88 BPM

## 2020-02-25 DIAGNOSIS — N32.89 OTHER SPECIFIED DISORDERS OF BLADDER: ICD-10-CM

## 2020-02-25 DIAGNOSIS — Z98.42 CATARACT EXTRACTION STATUS, LEFT EYE: Chronic | ICD-10-CM

## 2020-02-25 DIAGNOSIS — Z89.512 ACQUIRED ABSENCE OF LEFT LEG BELOW KNEE: Chronic | ICD-10-CM

## 2020-02-25 DIAGNOSIS — Z98.890 OTHER SPECIFIED POSTPROCEDURAL STATES: Chronic | ICD-10-CM

## 2020-02-25 DIAGNOSIS — Z98.41 CATARACT EXTRACTION STATUS, RIGHT EYE: Chronic | ICD-10-CM

## 2020-02-25 LAB
ANION GAP SERPL CALC-SCNC: 11 MMOL/L — SIGNIFICANT CHANGE UP (ref 5–17)
BLD GP AB SCN SERPL QL: SIGNIFICANT CHANGE UP
BUN SERPL-MCNC: 43 MG/DL — HIGH (ref 7–18)
CALCIUM SERPL-MCNC: 8.2 MG/DL — LOW (ref 8.4–10.5)
CHLORIDE SERPL-SCNC: 100 MMOL/L — SIGNIFICANT CHANGE UP (ref 96–108)
CO2 SERPL-SCNC: 24 MMOL/L — SIGNIFICANT CHANGE UP (ref 22–31)
CREAT SERPL-MCNC: 10.8 MG/DL — HIGH (ref 0.5–1.3)
GLUCOSE SERPL-MCNC: 143 MG/DL — HIGH (ref 70–99)
POTASSIUM SERPL-MCNC: 4.2 MMOL/L — SIGNIFICANT CHANGE UP (ref 3.5–5.3)
POTASSIUM SERPL-SCNC: 4.2 MMOL/L — SIGNIFICANT CHANGE UP (ref 3.5–5.3)
SODIUM SERPL-SCNC: 135 MMOL/L — SIGNIFICANT CHANGE UP (ref 135–145)

## 2020-02-25 PROCEDURE — 86901 BLOOD TYPING SEROLOGIC RH(D): CPT

## 2020-02-25 PROCEDURE — 87086 URINE CULTURE/COLONY COUNT: CPT

## 2020-02-25 PROCEDURE — 86900 BLOOD TYPING SEROLOGIC ABO: CPT

## 2020-02-25 PROCEDURE — 80048 BASIC METABOLIC PNL TOTAL CA: CPT

## 2020-02-25 PROCEDURE — 52224 CYSTOSCOPY AND TREATMENT: CPT

## 2020-02-25 PROCEDURE — 52204 CYSTOSCOPY W/BIOPSY(S): CPT

## 2020-02-25 PROCEDURE — 36415 COLL VENOUS BLD VENIPUNCTURE: CPT

## 2020-02-25 PROCEDURE — 86850 RBC ANTIBODY SCREEN: CPT

## 2020-02-25 PROCEDURE — 88305 TISSUE EXAM BY PATHOLOGIST: CPT

## 2020-02-25 PROCEDURE — 87186 SC STD MICRODIL/AGAR DIL: CPT

## 2020-02-25 PROCEDURE — 88305 TISSUE EXAM BY PATHOLOGIST: CPT | Mod: 26

## 2020-02-25 PROCEDURE — 82962 GLUCOSE BLOOD TEST: CPT

## 2020-02-25 RX ORDER — ONDANSETRON 8 MG/1
4 TABLET, FILM COATED ORAL ONCE
Refills: 0 | Status: DISCONTINUED | OUTPATIENT
Start: 2020-02-25 | End: 2020-02-25

## 2020-02-25 RX ORDER — FENTANYL CITRATE 50 UG/ML
25 INJECTION INTRAVENOUS
Refills: 0 | Status: DISCONTINUED | OUTPATIENT
Start: 2020-02-25 | End: 2020-02-25

## 2020-02-25 RX ORDER — CIPROFLOXACIN LACTATE 400MG/40ML
1 VIAL (ML) INTRAVENOUS
Qty: 14 | Refills: 0
Start: 2020-02-25 | End: 2020-03-02

## 2020-02-25 NOTE — ASU DISCHARGE PLAN (ADULT/PEDIATRIC) - CALL YOUR DOCTOR IF YOU HAVE ANY OF THE FOLLOWING:
Unable to urinate Fever greater than (need to indicate Fahrenheit or Celsius)/Bleeding that does not stop/Pain not relieved by Medications

## 2020-02-25 NOTE — ASU DISCHARGE PLAN (ADULT/PEDIATRIC) - CARE PROVIDER_API CALL
Sherri Atwood (MD; MPH)  Urology  [address] [place] [address] [place] [address]  Phone: [phone]  Fax: [phone]  Established Patient  Follow Up Time: 1 week

## 2020-02-27 LAB
-  AMIKACIN: SIGNIFICANT CHANGE UP
-  AMPICILLIN/SULBACTAM: SIGNIFICANT CHANGE UP
-  AMPICILLIN: SIGNIFICANT CHANGE UP
-  AZTREONAM: SIGNIFICANT CHANGE UP
-  CEFAZOLIN: SIGNIFICANT CHANGE UP
-  CEFEPIME: SIGNIFICANT CHANGE UP
-  CEFOXITIN: SIGNIFICANT CHANGE UP
-  CEFTRIAXONE: SIGNIFICANT CHANGE UP
-  CIPROFLOXACIN: SIGNIFICANT CHANGE UP
-  GENTAMICIN: SIGNIFICANT CHANGE UP
-  IMIPENEM: SIGNIFICANT CHANGE UP
-  LEVOFLOXACIN: SIGNIFICANT CHANGE UP
-  MEROPENEM: SIGNIFICANT CHANGE UP
-  NITROFURANTOIN: SIGNIFICANT CHANGE UP
-  PIPERACILLIN/TAZOBACTAM: SIGNIFICANT CHANGE UP
-  TIGECYCLINE: SIGNIFICANT CHANGE UP
-  TOBRAMYCIN: SIGNIFICANT CHANGE UP
-  TRIMETHOPRIM/SULFAMETHOXAZOLE: SIGNIFICANT CHANGE UP
CULTURE RESULTS: SIGNIFICANT CHANGE UP
METHOD TYPE: SIGNIFICANT CHANGE UP
ORGANISM # SPEC MICROSCOPIC CNT: SIGNIFICANT CHANGE UP
ORGANISM # SPEC MICROSCOPIC CNT: SIGNIFICANT CHANGE UP
SPECIMEN SOURCE: SIGNIFICANT CHANGE UP

## 2020-03-02 ENCOUNTER — APPOINTMENT (OUTPATIENT)
Dept: UROLOGY | Facility: CLINIC | Age: 60
End: 2020-03-02
Payer: MEDICAID

## 2020-03-02 VITALS
WEIGHT: 122 LBS | HEIGHT: 63 IN | BODY MASS INDEX: 21.62 KG/M2 | DIASTOLIC BLOOD PRESSURE: 77 MMHG | SYSTOLIC BLOOD PRESSURE: 124 MMHG | HEART RATE: 101 BPM

## 2020-03-02 DIAGNOSIS — E11.37X1 TYPE 2 DIABETES MELLITUS WITH DIABETIC MACULAR EDEMA, RESOLVED FOLLOWING TREATMENT, RIGHT EYE: ICD-10-CM

## 2020-03-02 DIAGNOSIS — Z79.4 TYPE 2 DIABETES MELLITUS WITH DIABETIC MACULAR EDEMA, RESOLVED FOLLOWING TREATMENT, RIGHT EYE: ICD-10-CM

## 2020-03-02 PROCEDURE — 99213 OFFICE O/P EST LOW 20 MIN: CPT

## 2020-03-02 NOTE — HISTORY OF PRESENT ILLNESS
[FreeTextEntry1] : 60 yo M presents with 5 month history of intermittent gross hematuria\par Sometimes dysuria\par History of ESRD and on HD for the last 8 months\par Makes about a cup of urine per day\par No prior history of stones, UTI or prostate issues\par \par 1/13/20 Interval history: Pt continues to have intermittent gross hematuria\par Cysto today showed abnormal bladder mucosa\par \par 3/2/20 Interval history: Pt is s/p bladder biopsy and fulguration\par Continues to have some intermittent hematuria but per pt, seems to have improved.\par Apparently, pt has not taken any of the abx prescribed previously stating that his brother hasn't been able to pick it up for him yet

## 2020-03-02 NOTE — ASSESSMENT
[FreeTextEntry1] : 60 yo M s/p bladder biopsy and fulguration, history of hematuria, UTI\par \par - pathology results still pending. Will call pt with results \par - Discussed importance of taking abx as prescribed as I suspect this is the etiology for his symptoms. Rx transmitted again and this time to pharmacy associated with his assisted living facility\par

## 2020-03-02 NOTE — PHYSICAL EXAM
[General Appearance - Well Developed] : well developed [Normal Appearance] : normal appearance [General Appearance - Well Nourished] : well nourished [Well Groomed] : well groomed [General Appearance - In No Acute Distress] : no acute distress [Abdomen Soft] : soft [Abdomen Tenderness] : non-tender [Costovertebral Angle Tenderness] : no ~M costovertebral angle tenderness [Urethral Meatus] : meatus normal [Urinary Bladder Findings] : the bladder was normal on palpation [Scrotum] : the scrotum was normal [Epididymis] : the epididymides were normal [Testes Tenderness] : no tenderness of the testes [Prostate Tenderness] : the prostate was not tender [Testes Mass (___cm)] : there were no testicular masses [No Prostate Nodules] : no prostate nodules [Prostate Size ___ gm] : prostate size [unfilled] gm [Edema] : no peripheral edema [] : no respiratory distress [Respiration, Rhythm And Depth] : normal respiratory rhythm and effort [Exaggerated Use Of Accessory Muscles For Inspiration] : no accessory muscle use [Oriented To Time, Place, And Person] : oriented to person, place, and time [Affect] : the affect was normal [Mood] : the mood was normal [FreeTextEntry1] : Uses walker, history of leg amputation [Not Anxious] : not anxious [No Palpable Adenopathy] : no palpable adenopathy [No Focal Deficits] : no focal deficits

## 2020-03-11 NOTE — DISCHARGE NOTE NURSING/CASE MANAGEMENT/SOCIAL WORK - NSDCPEEMAIL_GEN_ALL_CORE
11-Mar-2020 13:07 [facility] email [email or web address]

## 2020-03-16 ENCOUNTER — APPOINTMENT (OUTPATIENT)
Dept: VASCULAR SURGERY | Facility: CLINIC | Age: 60
End: 2020-03-16
Payer: MEDICAID

## 2020-03-16 VITALS
DIASTOLIC BLOOD PRESSURE: 92 MMHG | SYSTOLIC BLOOD PRESSURE: 164 MMHG | HEART RATE: 92 BPM | WEIGHT: 128 LBS | BODY MASS INDEX: 22.68 KG/M2 | HEIGHT: 63 IN | TEMPERATURE: 98.7 F

## 2020-03-16 PROCEDURE — 99213 OFFICE O/P EST LOW 20 MIN: CPT

## 2020-03-16 PROCEDURE — 93990 DOPPLER FLOW TESTING: CPT

## 2020-03-16 RX ORDER — CIPROFLOXACIN HYDROCHLORIDE 250 MG/1
250 TABLET, FILM COATED ORAL
Qty: 14 | Refills: 0 | Status: DISCONTINUED | COMMUNITY
Start: 2020-01-17 | End: 2020-03-16

## 2020-03-16 NOTE — HISTORY OF PRESENT ILLNESS
[] : right brachiocephalic fistula [FreeTextEntry1] : 58 yo male with history of esrd on hd presents for follow up pt reports some tenderness over the right upper extremity avf \par pt denies any issues with hd but states that he does have bleeding after hd but usually stops with bandage

## 2020-03-16 NOTE — DISCUSSION/SUMMARY
[FreeTextEntry1] : 58 yo male with history of esrd on hd presents for follow up pt reports some tenderness over the right upper extremity avf \par \par duplex shows >75% stenosis at the level of the clavicle  proximal to the cephalic subclavian junction) \par \par will arrange for fistulogram

## 2020-03-16 NOTE — PHYSICAL EXAM
[Pulsatile Thrill] : pulsatile thrill [Aneurysm] : aneurysm [Hand well perfused] : hand well perfused [2+] : left 2+ [Bleeding] : no bleeding [Ulcer] : no ulcer [Normal] : normoactive bowel sounds, soft and nontender, no hepatosplenomegaly or masses appreciated [de-identified] : intact

## 2020-03-25 ENCOUNTER — APPOINTMENT (OUTPATIENT)
Dept: ENDOVASCULAR SURGERY | Facility: CLINIC | Age: 60
End: 2020-03-25
Payer: MEDICAID

## 2020-04-01 ENCOUNTER — APPOINTMENT (OUTPATIENT)
Dept: ENDOVASCULAR SURGERY | Facility: CLINIC | Age: 60
End: 2020-04-01
Payer: MEDICAID

## 2020-04-07 ENCOUNTER — FORM ENCOUNTER (OUTPATIENT)
Age: 60
End: 2020-04-07

## 2020-04-08 ENCOUNTER — APPOINTMENT (OUTPATIENT)
Dept: ENDOVASCULAR SURGERY | Facility: CLINIC | Age: 60
End: 2020-04-08
Payer: MEDICAID

## 2020-04-08 VITALS
WEIGHT: 128 LBS | SYSTOLIC BLOOD PRESSURE: 110 MMHG | OXYGEN SATURATION: 94 % | HEIGHT: 66 IN | TEMPERATURE: 98.4 F | DIASTOLIC BLOOD PRESSURE: 70 MMHG | HEART RATE: 106 BPM | BODY MASS INDEX: 20.57 KG/M2 | RESPIRATION RATE: 15 BRPM

## 2020-04-08 PROCEDURE — 36902Z: CUSTOM

## 2020-04-08 NOTE — HISTORY OF PRESENT ILLNESS
[FreeTextEntry1] : creation 11-18-18 Dr Mason [FreeTextEntry4] : yesterday [FreeTextEntry5] : last night [FreeTextEntry6] : Dr Faith

## 2020-04-08 NOTE — PAST MEDICAL HISTORY
[FreeTextEntry1] : Malignant Hyperthermia Screening Tool and Risk of Bleeding Assessment \par \par Mr. KIAN VALERO denies family of unexpected death following Anesthesia or Exercise.\par Denies Family history of Malignant Hyperthermia, Muscle or Neuromuscular disorder and High Temperature  following exercise.\par \par Mr. KIAN VALERO denies history of Muscle Spasm, Dark or Chocolate- Colored and Unanticipated fever immediately following anaesthesia or serious exercise.\par Mr. KIAN VALERO also denies bleeding tendencies/Risks of Bleeding.\par

## 2020-04-13 ENCOUNTER — APPOINTMENT (OUTPATIENT)
Age: 60
End: 2020-04-13

## 2020-05-18 ENCOUNTER — APPOINTMENT (OUTPATIENT)
Dept: UROLOGY | Facility: CLINIC | Age: 60
End: 2020-05-18
Payer: MEDICAID

## 2020-05-18 VITALS
HEART RATE: 96 BPM | RESPIRATION RATE: 15 BRPM | HEIGHT: 66 IN | SYSTOLIC BLOOD PRESSURE: 160 MMHG | BODY MASS INDEX: 19.29 KG/M2 | TEMPERATURE: 97.7 F | WEIGHT: 120 LBS | DIASTOLIC BLOOD PRESSURE: 90 MMHG

## 2020-05-18 DIAGNOSIS — N39.0 URINARY TRACT INFECTION, SITE NOT SPECIFIED: ICD-10-CM

## 2020-05-18 DIAGNOSIS — R31.0 GROSS HEMATURIA: ICD-10-CM

## 2020-05-18 PROCEDURE — 99213 OFFICE O/P EST LOW 20 MIN: CPT

## 2020-05-18 NOTE — PHYSICAL EXAM
[General Appearance - Well Developed] : well developed [General Appearance - Well Nourished] : well nourished [Normal Appearance] : normal appearance [Well Groomed] : well groomed [General Appearance - In No Acute Distress] : no acute distress [Abdomen Soft] : soft [Abdomen Tenderness] : non-tender [Costovertebral Angle Tenderness] : no ~M costovertebral angle tenderness [Urethral Meatus] : meatus normal [Urinary Bladder Findings] : the bladder was normal on palpation [Epididymis] : the epididymides were normal [Scrotum] : the scrotum was normal [Testes Tenderness] : no tenderness of the testes [Testes Mass (___cm)] : there were no testicular masses [Prostate Tenderness] : the prostate was not tender [No Prostate Nodules] : no prostate nodules [Prostate Size ___ gm] : prostate size [unfilled] gm [Edema] : no peripheral edema [] : no respiratory distress [Oriented To Time, Place, And Person] : oriented to person, place, and time [Respiration, Rhythm And Depth] : normal respiratory rhythm and effort [Exaggerated Use Of Accessory Muscles For Inspiration] : no accessory muscle use [Not Anxious] : not anxious [Mood] : the mood was normal [Affect] : the affect was normal [FreeTextEntry1] : Uses walker, history of leg amputation [No Palpable Adenopathy] : no palpable adenopathy [No Focal Deficits] : no focal deficits

## 2020-05-18 NOTE — ASSESSMENT
[FreeTextEntry1] : 58 yo M with history of ESRD, recent gross hematuria likely secondary to UTI, now resolved. \par \par - discussed possible etiologies for UTI. Spent extensive period of time discussed behavioral modification including adequate hydration, cutting back on caffeine intake, timed voiding during the day, importance of controlling any diabetes and chronic constipation and how all of these things could potentially increase risk for persistent or recurrent UTI.\par - Given his history of ESRD, pt is fluid restricted but looks like pt isn't drinking any water at all. Encouraged pt to drink some water\par - FU as needed

## 2020-05-18 NOTE — HISTORY OF PRESENT ILLNESS
[FreeTextEntry1] : 60 yo M presents with 5 month history of intermittent gross hematuria\par Sometimes dysuria\par History of ESRD and on HD for the last 8 months\par Makes about a cup of urine per day\par No prior history of stones, UTI or prostate issues\par \par 1/13/20 Interval history: Pt continues to have intermittent gross hematuria\par Cysto today showed abnormal bladder mucosa\par \par 3/2/20 Interval history: Pt is s/p bladder biopsy and fulguration\par Continues to have some intermittent hematuria but per pt, seems to have improved.\par Apparently, pt has not taken any of the abx prescribed previously stating that his brother hasn't been able to pick it up for him yet\par \par 5/18/20 Interval history: Doing well since last visit\par No more gross hematuria since vinishing full course of abx\par Does have intermittent foul smelling urine - no dysuria, no fever\par Produces about 1 cup of urine per day\par Admits he doesn't drink any water at all - just a couple sips with meds

## 2020-05-19 NOTE — ED ADULT NURSE NOTE - PRO INTERPRETER NEED 2
Additional Notes: Patient consent was obtained to proceed with the visit and recommended plan of care after discussion of all risks and benefits, including the risks of COVID-19 exposure. Detail Level: Simple English

## 2020-07-20 ENCOUNTER — APPOINTMENT (OUTPATIENT)
Dept: VASCULAR SURGERY | Facility: CLINIC | Age: 60
End: 2020-07-20
Payer: MEDICAID

## 2020-07-20 VITALS — TEMPERATURE: 96.9 F

## 2020-07-20 PROCEDURE — 99213 OFFICE O/P EST LOW 20 MIN: CPT

## 2020-07-20 PROCEDURE — 93990 DOPPLER FLOW TESTING: CPT

## 2020-07-20 NOTE — ASSESSMENT
[FreeTextEntry1] : Patient with renal failure on hemodialysis.  Right brachiocephalic fistula functioning well.  Continue conservative management.  Patient has moderate stenosis.  Follow-up in 3 months.

## 2020-07-20 NOTE — PHYSICAL EXAM
[Thrill] : thrill [Hand well perfused] : hand well perfused [Normal] : coordination grossly intact, no focal deficits

## 2020-07-21 NOTE — PROGRESS NOTE ADULT - PROBLEM SELECTOR PROBLEM 5
Palliative care encounter
ESRD (end stage renal disease) on dialysis
ESRD (end stage renal disease) on dialysis
HTN (hypertension)
no
ESRD (end stage renal disease) on dialysis

## 2020-08-05 NOTE — PROGRESS NOTE ADULT - PROBLEM SELECTOR PROBLEM 10
Need for prophylactic measure O-T Advancement Flap Text: The defect edges were debeveled with a #15 scalpel blade.  Given the location of the defect, shape of the defect and the proximity to free margins an O-T advancement flap was deemed most appropriate.  Using a sterile surgical marker, an appropriate advancement flap was drawn incorporating the defect and placing the expected incisions within the relaxed skin tension lines where possible.    The area thus outlined was incised deep to adipose tissue with a #15 scalpel blade.  The skin margins were undermined to an appropriate distance in all directions utilizing iris scissors.

## 2020-08-23 ENCOUNTER — EMERGENCY (EMERGENCY)
Facility: HOSPITAL | Age: 60
LOS: 1 days | Discharge: HOME | End: 2020-08-23
Attending: EMERGENCY MEDICINE
Payer: MEDICAID

## 2020-08-23 VITALS
HEART RATE: 92 BPM | OXYGEN SATURATION: 95 % | DIASTOLIC BLOOD PRESSURE: 72 MMHG | RESPIRATION RATE: 18 BRPM | SYSTOLIC BLOOD PRESSURE: 118 MMHG | TEMPERATURE: 99 F

## 2020-08-23 DIAGNOSIS — Z98.890 OTHER SPECIFIED POSTPROCEDURAL STATES: Chronic | ICD-10-CM

## 2020-08-23 DIAGNOSIS — Z89.512 ACQUIRED ABSENCE OF LEFT LEG BELOW KNEE: Chronic | ICD-10-CM

## 2020-08-23 DIAGNOSIS — Z98.41 CATARACT EXTRACTION STATUS, RIGHT EYE: Chronic | ICD-10-CM

## 2020-08-23 DIAGNOSIS — Z98.42 CATARACT EXTRACTION STATUS, LEFT EYE: Chronic | ICD-10-CM

## 2020-08-23 PROCEDURE — 73562 X-RAY EXAM OF KNEE 3: CPT

## 2020-08-23 PROCEDURE — 73562 X-RAY EXAM OF KNEE 3: CPT | Mod: 26,LT

## 2020-08-23 PROCEDURE — 99283 EMERGENCY DEPT VISIT LOW MDM: CPT

## 2020-08-23 PROCEDURE — 99283 EMERGENCY DEPT VISIT LOW MDM: CPT | Mod: 25

## 2020-08-23 NOTE — ED PROVIDER NOTE - MUSCULOSKELETAL, MLM
Left leg BKA. Mobile, nontender, nonerythematous swelling on lateral aspect of left knee. Soft and doughy in consistency. Does not transilluminate. No pain on palpation of swelling. Pain elicited upon palpation of left pre-patellar bursa. no pain with L knee flexion/extension. strength 5/5, ROM intact. Right leg: no pertinent findings. Left leg BKA stump normal appearing without fluctuance or underlying skin changes. Mobile, nontender, nonerythematous swelling on lateral aspect of left knee 5cm circular. Soft and doughy in consistency. Does not transilluminate. No pain on palpation of swelling. no pain with L knee flexion/extension. strength 5/5, ROM intact. Right leg: no pertinent findings.

## 2020-08-23 NOTE — ED PROVIDER NOTE - MUSCULOSKELETAL NEGATIVE STATEMENT, MLM
Left knee stump swelling per HPI. No pain at rest. no pain/discomfort with motion. Right leg: no complaints. Left knee lateral joint space swelling per HPI. No pain at rest. no pain/discomfort with motion. Right leg: no complaints.

## 2020-08-23 NOTE — ED PROVIDER NOTE - PROGRESS NOTE DETAILS
cisneros: pt xr shows left lateral knee shows hyperdense tissue. no need to drain.  f/u with outpt ortho and further work up.

## 2020-08-23 NOTE — ED PROVIDER NOTE - PLAN OF CARE
59 yr old male from [facility] with PMH of HTN, DM, diabetic retinopathy, glaucoma, osteoporosis, OA, LS spinal stenosis, PVD, hyperparathyroidism, PVD, ESRD on HD, and Left BKA (2012) presents to ED requesting eval and drainage of left knee swelling. PT noticed this lump on his knee when he felt it one day last week after removing his prosthetic. He was evaluated by a provider at [facility] who offered to drain it but pt refused saying "he's not a real doctor." Pt then presented to [facility], received blood work and metabolic workup but was denied drainage. Today pt only concern is eval and drainage of knee swelling. Swelling on lateral aspect of left knee is soft, well-demarcated, and non-tender. Doughy consistency and does not transilluminate. No pain or restricted of left knee movement. No erythema, subcutaneous emphysema. Pt has no complaints or symptoms in regards to standing medical conditions.   1) Lipoma vs fluid cyst vs septic joint: based on the demarcation, consistency, and location of this swelling it is likely a lipoma. A fluid collection may be more midline and compressible. Furthermore, a fluid filled cyst would likely transilluminate, where as pt's swelling did not. No evidence of infection (systemic fever, local heat/erythema/pain), so septic joint infection is least likely. Pt reports incidentally noticing the swelling while feeling his knee one day. It is possible that he has had this swelling for a long time but only noticed recently, further supporting dx of slowly developing lipoma.   2) inflammatory swelling secondary to OA: Pt has hx of OA and uses a prosthetic on this leg, increasing risk of wear/tear related damage but OA related swelling would involve joint directly and be associated with more pain. Pt's swelling is lateral to joint and seems more superficial/subcutaneous.   3) Gout considered bc pt has hx of ESRD but very unlikely due to lack of pain/erythema and exam findings.

## 2020-08-23 NOTE — ED PROVIDER NOTE - OBJECTIVE STATEMENT
59 yr old male from [facility] with PMH of HTN, DM, diabetic retinopathy, glaucoma, osteoporosis, OA, LS spinal stenosis, PVD, hyperparathyroidism, PVD, ESRD on HD, and Left BKA (2012) presents with cc of swelling of knee for 1 week. Pt felt knee after removing prosthetic and noticed lump/enlargement on lateral aspect of left knee 1 week ago. Provider at [facility] offered to drain but pt refused w/o further follow up. Pt went to [facility] but they did not drain there (reason unclear?). Pt presents today seeking eval and drainage of his knee swelling. No complaints of pain over the past week except when he walks significantly with prosthetic; mild pain and discomfort without prosthetic today. No complaints of restricted joint motion, additional swelling. Pt denies recent illness, fever, no n/v/d, HA, dizziness, recent falls or trauma to the joint. 59 yr old male from [facility] with PMH of HTN, DM, diabetic retinopathy, glaucoma, osteoporosis, OA, LS spinal stenosis, PVD, hyperparathyroidism, PVD, ESRD on HD, and Left BKA (2012) presents with cc of swelling of knee for 1-2 week. Pt felt knee after removing prosthetic and noticed lump/enlargement on lateral aspect of left knee -21 week ago. Provider at [facility] offered to drain but pt refused w/o further follow up. Pt went to [facility] but they did not drain there (reason unclear?). Pt presents today seeking eval and drainage of his knee swelling. No complaints of pain over the past week except when he walks significantly with prosthetic; mild pain and discomfort without prosthetic today. No complaints of restricted joint motion, additional swelling. Pt denies recent illness, fever, no n/v/d, HA, dizziness, recent falls or trauma to the joint.

## 2020-08-23 NOTE — ED ADULT NURSE NOTE - OBJECTIVE STATEMENT
pt presents to ed for L knee pain pt presents to ed for L knee pain. pt has Left BKA. Denies cp, sob, cough, fever, chills.

## 2020-08-23 NOTE — ED PROVIDER NOTE - CARE PLAN
Assessment and plan of treatment:	59 yr old male from [facility] with PMH of HTN, DM, diabetic retinopathy, glaucoma, osteoporosis, OA, LS spinal stenosis, PVD, hyperparathyroidism, PVD, ESRD on HD, and Left BKA (2012) presents to ED requesting eval and drainage of left knee swelling. PT noticed this lump on his knee when he felt it one day last week after removing his prosthetic. He was evaluated by a provider at [facility] who offered to drain it but pt refused saying "he's not a real doctor." Pt then presented to [facility], received blood work and metabolic workup but was denied drainage. Today pt only concern is eval and drainage of knee swelling. Swelling on lateral aspect of left knee is soft, well-demarcated, and non-tender. Doughy consistency and does not transilluminate. No pain or restricted of left knee movement. No erythema, subcutaneous emphysema. Pt has no complaints or symptoms in regards to standing medical conditions.   1) Lipoma vs fluid cyst vs septic joint: based on the demarcation, consistency, and location of this swelling it is likely a lipoma. A fluid collection may be more midline and compressible. Furthermore, a fluid filled cyst would likely transilluminate, where as pt's swelling did not. No evidence of infection (systemic fever, local heat/erythema/pain), so septic joint infection is least likely. Pt reports incidentally noticing the swelling while feeling his knee one day. It is possible that he has had this swelling for a long time but only noticed recently, further supporting dx of slowly developing lipoma.   2) inflammatory swelling secondary to OA: Pt has hx of OA and uses a prosthetic on this leg, increasing risk of wear/tear related damage but OA related swelling would involve joint directly and be associated with more pain. Pt's swelling is lateral to joint and seems more superficial/subcutaneous.   3) Gout considered bc pt has hx of ESRD but very unlikely due to lack of pain/erythema and exam findings. Principal Discharge DX:	Soft tissue lesion

## 2020-08-23 NOTE — ED PROVIDER NOTE - ATTENDING CONTRIBUTION TO CARE
I was physically present for the E/M service provided. I agree with above history, physical, and plan which I have reviewed and edited where appropriate. I was physically present for the key portions of the service provided.    Dillan: 59 yr old male from [facility] with PMH of HTN, DM, diabetic retinopathy, glaucoma, osteoporosis, OA, LS spinal stenosis, PVD, hyperparathyroidism, PVD, ESRD on HD, and Left BKA (2012) presents to ED requesting eval and drainage of left knee swelling. no joint pain, no fever, no trauma    left knee- FROm, no underlying skin changes, left lateral knee circumferential soft mobile mass 5cm,     a/p: likely lipoma vs cyst - not consistent with abscess.  if xr neg and not concerning can get outpt ortho for further workup.

## 2020-08-23 NOTE — ED PROVIDER NOTE - DISCHARGE DATE
23-Aug-2020 Consent (Nose)/Introductory Paragraph: The rationale for Mohs was explained to the patient and consent was obtained. The risks, benefits and alternatives to therapy were discussed in detail. Specifically, the risks of nasal deformity, changes in the flow of air through the nose, infection, scarring, bleeding, prolonged wound healing, incomplete removal, allergy to anesthesia, nerve injury and recurrence were addressed. Prior to the procedure, the treatment site was clearly identified and confirmed by the patient. All components of Universal Protocol/PAUSE Rule completed.

## 2020-08-23 NOTE — ED PROVIDER NOTE - PSH
Below knee amputation status, left    History of left cataract extraction    History of right cataract extraction    S/P arteriovenous (AV) fistula creation  2018

## 2020-08-23 NOTE — ED PROVIDER NOTE - EYES, MLM
Pupils equal but not reactive to light. Significant opaque/cloudiness noted upper hemisphere of left eye. Clear sclera. decreased visual fields; Right field deficiency greater than left (baseline).

## 2020-08-23 NOTE — ED PROVIDER NOTE - PATIENT PORTAL LINK FT
You can access the FollowMyHealth Patient Portal offered by [facility] by registering at the following website: [email or web address]. By joining [employer]’s FollowMyHealth portal, you will also be able to view your health information using other applications (apps) compatible with our system.

## 2020-08-23 NOTE — ED PROVIDER NOTE - PMH
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

## 2020-08-23 NOTE — ED PROVIDER NOTE - CLINICAL SUMMARY MEDICAL DECISION MAKING FREE TEXT BOX
59 yr old male from [facility] with PMH of HTN, DM, diabetic retinopathy, glaucoma, osteoporosis, OA, LS spinal stenosis, PVD, hyperparathyroidism, PVD, ESRD on HD, and Left BKA (2012) presents to ED requesting eval and drainage of left knee swelling. PT noticed this lump on his knee when he felt it one day last week after removing his prosthetic. He was evaluated by a provider at [facility] who offered to drain it but pt refused saying "he's not a real doctor." Pt then presented to [facility], received blood work and metabolic workup but was denied drainage. Today pt only concern is eval and drainage of knee swelling. Swelling on lateral aspect of left knee is soft, well-demarcated, and non-tender. Doughy consistency and does not transilluminate. No pain or restricted of left knee movement. No erythema, subcutaneous emphysema. Pt has no complaints or symptoms in regards to standing medical conditions.   1) Lipoma vs fluid cyst: based on the demarcation, consistency, and location of this swelling it is likely a lipoma. A fluid collection may be more midline and compressible. Furthermore, a fluid filled cyst would likely transilluminate, where as pt's swelling did not. No evidence of infection (systemic fever, local heat/erythema/pain), so septic joint infection is least likely. Pt reports incidentally noticing the swelling while feeling his knee one day. It is possible that he has had this swelling for a long time but only noticed recently, further supporting dx of slowly developing lipoma.     will perform xr and depending on imaging can consider ct to further evaluate mass. likely no intervention

## 2020-08-24 VITALS
HEART RATE: 88 BPM | TEMPERATURE: 98 F | DIASTOLIC BLOOD PRESSURE: 94 MMHG | OXYGEN SATURATION: 99 % | SYSTOLIC BLOOD PRESSURE: 146 MMHG | RESPIRATION RATE: 17 BRPM

## 2020-10-26 ENCOUNTER — APPOINTMENT (OUTPATIENT)
Dept: VASCULAR SURGERY | Facility: CLINIC | Age: 60
End: 2020-10-26
Payer: MEDICAID

## 2020-10-26 VITALS — TEMPERATURE: 96.9 F

## 2020-10-26 PROCEDURE — 99213 OFFICE O/P EST LOW 20 MIN: CPT

## 2020-10-26 PROCEDURE — 93990 DOPPLER FLOW TESTING: CPT

## 2020-10-26 NOTE — DISCUSSION/SUMMARY
[FreeTextEntry1] : 59 yo male with history of esrd on hd presents for follow up \par duplex shows patent avf with 50-75% stenosis of the juxta anastomosis and cephalic subclavian junction with flow rate of 869 \par \par will continue to monitor pt advised to avoid puncturing the thinned skin area of the avf \par pt to follow u pin 2 months with repeat duplex

## 2020-10-26 NOTE — PHYSICAL EXAM
[Thrill] : thrill [Aneurysm] : aneurysm [Hand well perfused] : hand well perfused [2+] : left 2+ [Pulsatile Thrill] : no pulsatile thrill [Bleeding] : no bleeding [Ulcer] : no ulcer [Normal] : normoactive bowel sounds, soft and nontender, no hepatosplenomegaly or masses appreciated [de-identified] : small area of thinning skin over the distal aneurysm site

## 2020-10-26 NOTE — HISTORY OF PRESENT ILLNESS
[] : left brachiocephalic fistula [FreeTextEntry1] : 59 yo male with history of esrd on hd presents for follow up \par pt denies any issues with hd \par pt does report that he feels like the distal end of the fistula is going to burst \par

## 2020-11-27 ENCOUNTER — EMERGENCY (EMERGENCY)
Facility: HOSPITAL | Age: 60
LOS: 1 days | Discharge: LEFT BEFORE DISCHARGE | End: 2020-11-27
Attending: EMERGENCY MEDICINE
Payer: MEDICAID

## 2020-11-27 VITALS
TEMPERATURE: 98 F | DIASTOLIC BLOOD PRESSURE: 77 MMHG | HEART RATE: 88 BPM | RESPIRATION RATE: 20 BRPM | OXYGEN SATURATION: 99 % | WEIGHT: 119.93 LBS | SYSTOLIC BLOOD PRESSURE: 121 MMHG | HEIGHT: 62 IN

## 2020-11-27 DIAGNOSIS — Z98.42 CATARACT EXTRACTION STATUS, LEFT EYE: Chronic | ICD-10-CM

## 2020-11-27 DIAGNOSIS — Z98.890 OTHER SPECIFIED POSTPROCEDURAL STATES: Chronic | ICD-10-CM

## 2020-11-27 DIAGNOSIS — Z98.41 CATARACT EXTRACTION STATUS, RIGHT EYE: Chronic | ICD-10-CM

## 2020-11-27 DIAGNOSIS — Z89.512 ACQUIRED ABSENCE OF LEFT LEG BELOW KNEE: Chronic | ICD-10-CM

## 2020-11-27 LAB
ALBUMIN SERPL ELPH-MCNC: 3.2 G/DL — LOW (ref 3.5–5)
ALP SERPL-CCNC: 92 U/L — SIGNIFICANT CHANGE UP (ref 40–120)
ALT FLD-CCNC: 10 U/L DA — SIGNIFICANT CHANGE UP (ref 10–60)
ANION GAP SERPL CALC-SCNC: 14 MMOL/L — SIGNIFICANT CHANGE UP (ref 5–17)
AST SERPL-CCNC: 6 U/L — LOW (ref 10–40)
BASE EXCESS BLDA CALC-SCNC: 0 MMOL/L — SIGNIFICANT CHANGE UP (ref -2–2)
BASOPHILS # BLD AUTO: 0.01 K/UL — SIGNIFICANT CHANGE UP (ref 0–0.2)
BASOPHILS NFR BLD AUTO: 0.3 % — SIGNIFICANT CHANGE UP (ref 0–2)
BILIRUB SERPL-MCNC: 0.5 MG/DL — SIGNIFICANT CHANGE UP (ref 0.2–1.2)
BLOOD GAS COMMENTS ARTERIAL: SIGNIFICANT CHANGE UP
BUN SERPL-MCNC: 61 MG/DL — HIGH (ref 7–18)
CALCIUM SERPL-MCNC: 7.1 MG/DL — LOW (ref 8.4–10.5)
CHLORIDE SERPL-SCNC: 96 MMOL/L — SIGNIFICANT CHANGE UP (ref 96–108)
CO2 SERPL-SCNC: 25 MMOL/L — SIGNIFICANT CHANGE UP (ref 22–31)
CREAT SERPL-MCNC: 12.7 MG/DL — HIGH (ref 0.5–1.3)
EOSINOPHIL # BLD AUTO: 0.1 K/UL — SIGNIFICANT CHANGE UP (ref 0–0.5)
EOSINOPHIL NFR BLD AUTO: 3 % — SIGNIFICANT CHANGE UP (ref 0–6)
ETHANOL SERPL-MCNC: <3 MG/DL — SIGNIFICANT CHANGE UP (ref 0–10)
GLUCOSE SERPL-MCNC: 171 MG/DL — HIGH (ref 70–99)
HCO3 BLDA-SCNC: 25 MMOL/L — SIGNIFICANT CHANGE UP (ref 23–27)
HCT VFR BLD CALC: 31.1 % — LOW (ref 39–50)
HGB BLD-MCNC: 9.7 G/DL — LOW (ref 13–17)
HOROWITZ INDEX BLDA+IHG-RTO: 30 — SIGNIFICANT CHANGE UP
IMM GRANULOCYTES NFR BLD AUTO: 0.3 % — SIGNIFICANT CHANGE UP (ref 0–1.5)
LYMPHOCYTES # BLD AUTO: 0.5 K/UL — LOW (ref 1–3.3)
LYMPHOCYTES # BLD AUTO: 14.9 % — SIGNIFICANT CHANGE UP (ref 13–44)
MAGNESIUM SERPL-MCNC: 2.6 MG/DL — SIGNIFICANT CHANGE UP (ref 1.6–2.6)
MCHC RBC-ENTMCNC: 29.6 PG — SIGNIFICANT CHANGE UP (ref 27–34)
MCHC RBC-ENTMCNC: 31.2 GM/DL — LOW (ref 32–36)
MCV RBC AUTO: 94.8 FL — SIGNIFICANT CHANGE UP (ref 80–100)
MONOCYTES # BLD AUTO: 0.31 K/UL — SIGNIFICANT CHANGE UP (ref 0–0.9)
MONOCYTES NFR BLD AUTO: 9.3 % — SIGNIFICANT CHANGE UP (ref 2–14)
NEUTROPHILS # BLD AUTO: 2.42 K/UL — SIGNIFICANT CHANGE UP (ref 1.8–7.4)
NEUTROPHILS NFR BLD AUTO: 72.2 % — SIGNIFICANT CHANGE UP (ref 43–77)
NRBC # BLD: 0 /100 WBCS — SIGNIFICANT CHANGE UP (ref 0–0)
PCO2 BLDA: 48 MMHG — HIGH (ref 32–46)
PH BLDA: 7.35 — SIGNIFICANT CHANGE UP (ref 7.35–7.45)
PLATELET # BLD AUTO: 151 K/UL — SIGNIFICANT CHANGE UP (ref 150–400)
PO2 BLDA: 72 MMHG — LOW (ref 74–108)
POTASSIUM SERPL-MCNC: 6.4 MMOL/L — CRITICAL HIGH (ref 3.5–5.3)
POTASSIUM SERPL-SCNC: 6.4 MMOL/L — CRITICAL HIGH (ref 3.5–5.3)
PROT SERPL-MCNC: 7 G/DL — SIGNIFICANT CHANGE UP (ref 6–8.3)
RBC # BLD: 3.28 M/UL — LOW (ref 4.2–5.8)
RBC # FLD: 16 % — HIGH (ref 10.3–14.5)
SAO2 % BLDA: 90 % — LOW (ref 92–96)
SARS-COV-2 RNA SPEC QL NAA+PROBE: SIGNIFICANT CHANGE UP
SODIUM SERPL-SCNC: 135 MMOL/L — SIGNIFICANT CHANGE UP (ref 135–145)
TROPONIN I SERPL-MCNC: <0.015 NG/ML — SIGNIFICANT CHANGE UP (ref 0–0.04)
WBC # BLD: 3.35 K/UL — LOW (ref 3.8–10.5)
WBC # FLD AUTO: 3.35 K/UL — LOW (ref 3.8–10.5)

## 2020-11-27 PROCEDURE — 36415 COLL VENOUS BLD VENIPUNCTURE: CPT

## 2020-11-27 PROCEDURE — 82962 GLUCOSE BLOOD TEST: CPT

## 2020-11-27 PROCEDURE — 71250 CT THORAX DX C-: CPT | Mod: 26

## 2020-11-27 PROCEDURE — 83735 ASSAY OF MAGNESIUM: CPT

## 2020-11-27 PROCEDURE — 71045 X-RAY EXAM CHEST 1 VIEW: CPT

## 2020-11-27 PROCEDURE — 70450 CT HEAD/BRAIN W/O DYE: CPT

## 2020-11-27 PROCEDURE — 99284 EMERGENCY DEPT VISIT MOD MDM: CPT | Mod: 25

## 2020-11-27 PROCEDURE — 80307 DRUG TEST PRSMV CHEM ANLYZR: CPT

## 2020-11-27 PROCEDURE — 71250 CT THORAX DX C-: CPT

## 2020-11-27 PROCEDURE — 85025 COMPLETE CBC W/AUTO DIFF WBC: CPT

## 2020-11-27 PROCEDURE — 70450 CT HEAD/BRAIN W/O DYE: CPT | Mod: 26

## 2020-11-27 PROCEDURE — 99284 EMERGENCY DEPT VISIT MOD MDM: CPT

## 2020-11-27 PROCEDURE — 93005 ELECTROCARDIOGRAM TRACING: CPT

## 2020-11-27 PROCEDURE — 87635 SARS-COV-2 COVID-19 AMP PRB: CPT

## 2020-11-27 PROCEDURE — 80053 COMPREHEN METABOLIC PANEL: CPT

## 2020-11-27 PROCEDURE — 84484 ASSAY OF TROPONIN QUANT: CPT

## 2020-11-27 PROCEDURE — 82803 BLOOD GASES ANY COMBINATION: CPT

## 2020-11-27 PROCEDURE — 71045 X-RAY EXAM CHEST 1 VIEW: CPT | Mod: 26

## 2020-11-27 RX ORDER — ALBUTEROL 90 UG/1
2.5 AEROSOL, METERED ORAL ONCE
Refills: 0 | Status: COMPLETED | OUTPATIENT
Start: 2020-11-27 | End: 2020-11-27

## 2020-11-27 RX ORDER — SODIUM POLYSTYRENE SULFONATE 4.1 MEQ/G
30 POWDER, FOR SUSPENSION ORAL ONCE
Refills: 0 | Status: COMPLETED | OUTPATIENT
Start: 2020-11-27 | End: 2020-11-27

## 2020-11-27 NOTE — ED PROVIDER NOTE - OBJECTIVE STATEMENT
61 y/o man, ESRD on HD (Tues, Thurs, Sat, but this week was on Wed. due to holiday week), HTN, DM, CAD, peripheral neuropathy, CHF, presents from [facility] for syncope vs. near-syncope and was reportedly hypoxic with pulse ox 79% on RA, though Pt denies any shortness of breath or other symptoms.  He denies CP/fever/cough.  He says he no longer uses any drugs.

## 2020-11-27 NOTE — ED PROVIDER NOTE - PROGRESS NOTE DETAILS
RN was able to obtain phlebotomy lab specimens, but was unsuccessful with initial attempts at IV placement.  Pt then refused any further attempts against medical advise.  Advised him that he needs CTA chest to r/o PE and needs IV treatment for hyperkalemia, which could be life-threatening, but he refuses AMA.  Pt says he understands and accepts responsibility.  He does not want to be admitted to hospital either. RN was able to obtain phlebotomy lab specimens, but was unsuccessful with initial attempts at IV placement.  Pt then refused any further attempts against medical advise.  Advised him that he needs CTA chest to r/o PE and needs IV treatment for hyperkalemia, which could be life-threatening, but he refuses AMA.  Pt says he understands and accepts responsibility.  He does not want to be admitted to hospital either.  I spoke with Pt again at length and he still refuses.  I then spoke with Dr. De La Torre, MILENA, so he is aware.  Will arrange transportation back to [facility].

## 2020-11-27 NOTE — ED ADULT NURSE NOTE - OBJECTIVE STATEMENT
BIB EMS from [facility] alert and verbally responsive,  reports felt dizzy and sat up on the floor denies LOC, chest pain , SOB . Patient on hemodialysis Tu/ Th/Sat  Rt arm AV shunt with + bruit noted.

## 2020-11-27 NOTE — ED PROVIDER NOTE - CLINICAL SUMMARY MEDICAL DECISION MAKING FREE TEXT BOX
59 y/o man, ESRD on HD (Tues, Thurs, Sat, but this week was on Wed. due to holiday week), HTN, DM, CAD, peripheral neuropathy, CHF, presents from [facility] for syncope vs. near-syncope and was reportedly hypoxic with pulse ox 79% on RA, though Pt denies any shortness of breath or other symptoms; he is improved now and pulse ox is in 90's on room air--labs, EKG, CXR, advised CTA chest.

## 2020-11-27 NOTE — ED ADULT NURSE REASSESSMENT NOTE - NS ED NURSE REASSESS COMMENT FT1
Pt is alert and oriented x3. Pt refused meds/ IV access. Dr. Morris made aware. No acute distress noted. Safety precaution maintained.

## 2020-11-27 NOTE — ED PROVIDER NOTE - CARE PLAN
Principal Discharge DX:	Syncope, unspecified syncope type  Secondary Diagnosis:	Hypoxia  Secondary Diagnosis:	Hyperkalemia

## 2020-11-27 NOTE — ED PROVIDER NOTE - NSFOLLOWUPINSTRUCTIONS_ED_ALL_ED_FT
Hyperkalemia    WHAT YOU NEED TO KNOW:    Hyperkalemia is a high level of potassium in your blood. Potassium helps control how your muscles, heart, and digestive system work.    DISCHARGE INSTRUCTIONS:    Medicines:   •Medicines will be given to remove potassium from your body. This will lower your potassium levels. This medicine may be given as a pill or an enema.      •Take your medicine as directed. Contact your healthcare provider if you think your medicine is not helping or if you have side effects. Tell him of her if you are allergic to any medicine. Keep a list of the medicines, vitamins, and herbs you take. Include the amounts, and when and why you take them. Bring the list or the pill bottles to follow-up visits. Carry your medicine list with you in case of an emergency.      Limit the amount of potassium you eat: Foods that are high in potassium include bananas, tomatoes, oranges, turkey, and milk. Orange juice, citrus juices, and tomato juice are also high in potassium. Do not use salt substitutes. You may need to meet with a dietitian to help plan the best meals for you.    Follow up with your healthcare provider as directed: Write down your questions so you remember to ask them during your visits.     Contact your healthcare provider if:   •You have nausea or are vomiting.      •You have numbness or tingling in your arms or legs.      •Your symptoms do not go away or they get worse.      •You have questions or concerns about your condition or care.      Return to the emergency department if:   •You have trouble breathing.      •You have an irregular heartbeat.      •You have trouble controlling your muscles.      •You are too tired or weak to stand up.         © Copyright [employer] 2020           back to top                          © Copyright [employer] 2020                     Syncope    WHAT YOU NEED TO KNOW:    Syncope is also called fainting or passing out. Syncope is a sudden, temporary loss of consciousness, followed by a fall from a standing or sitting position. Syncope ranges from not serious to a sign of a more serious condition that needs to be treated. You can control some health conditions that cause syncope. Your healthcare providers can help you create a plan to manage syncope and prevent episodes.    DISCHARGE INSTRUCTIONS:    Seek care immediately if:   •You are bleeding because you hit your head when you fainted.       •You suddenly have double vision, difficulty speaking, numbness, and cannot move your arms or legs.      •You have chest pain and trouble breathing.      •You vomit blood or material that looks like coffee grounds.      •You see blood in your bowel movement.      Contact your healthcare provider if:   •You have new or worsening symptoms.      •You have another syncope episode.      •You have a headache, fast heartbeat, or feel too dizzy to stand up.      •You have questions or concerns about your condition or care.      Medicines:   •Medicines may be needed to help your heart pump strongly and regularly. Your healthcare provider may also make changes to any medicines that are causing syncope.       •Take your medicine as directed. Contact your healthcare provider if you think your medicine is not helping or if you have side effects. Tell him or her if you are allergic to any medicine. Keep a list of the medicines, vitamins, and herbs you take. Include the amounts, and when and why you take them. Bring the list or the pill bottles to follow-up visits. Carry your medicine list with you in case of an emergency.      Follow up with your healthcare provider as directed: Write down your questions so you remember to ask them during your visits.     Manage syncope:   •Keep a record of your syncope episodes. Include your symptoms and your activity before and after the episode. The record can help your healthcare provider find the cause of your syncope and help you manage episodes.      •Sit or lie down when needed. This includes when you feel dizzy, your throat is getting tight, and your vision changes. Raise your legs above the level of your heart.      •Take slow, deep breaths if you start to breathe faster with anxiety or fear. This can help decrease dizziness and the feeling that you might faint.       •Check your blood pressure often. This is important if you take medicine to lower your blood pressure. Check your blood pressure when you are lying down and when you are standing. Ask how often to check during the day. Keep a record of your blood pressure numbers. Your healthcare provider may use the record to help plan your treatment.  How to take a Blood Pressure           Prevent a syncope episode:   •Move slowly and let yourself get used to one position before you move to another position. This is very important when you change from a lying or sitting position to a standing position. Take some deep breaths before you stand up from a lying position. Stand up slowly. Sudden movements may cause a fainting spell. Sit on the side of the bed or couch for a few minutes before you stand up. If you are on bedrest, try to be upright for about 2 hours each day, or as directed. Do not lock your legs if you are standing for a long period of time. Move your legs and bend your knees to keep blood flowing.      •Follow your healthcare provider's recommendations. Your provider may recommend that you drink more liquids to prevent dehydration. You may also need to have more salt to keep your blood pressure from dropping too low and causing syncope. Your provider will tell you how much liquid and sodium to have each day. He or she will also tell you how much physical activity is safe for you. This will depend on what is causing your syncope.      •Watch for signs of low blood sugar. These include hunger, nervousness, sweating, and fast or fluttery heartbeats. Talk with your healthcare provider about ways to keep your blood sugar level steady.      •Do not strain if you are constipated. You may faint if you strain to have a bowel movement. Walking is the best way to get your bowels moving. Eat foods high in fiber to make it easier to have a bowel movement. Good examples are high-fiber cereals, beans, vegetables, and whole-grain breads. Prune juice may help make bowel movements softer.      •Be careful in hot weather. Heat can cause a syncope episode. Limit activity done outside on hot days. Physical activity in hot weather can lead to dehydration. This can cause an episode.         © Copyright [employer] 2020           back to top                          © Copyright [employer] 2020

## 2020-11-27 NOTE — ED PROVIDER NOTE - PATIENT PORTAL LINK FT
You can access the FollowMyHealth Patient Portal offered by [facility] by registering at the following website: [email or web address]. By joining [facility]’s FollowMyHealth portal, you will also be able to view your health information using other applications (apps) compatible with our system.

## 2020-11-28 VITALS
DIASTOLIC BLOOD PRESSURE: 76 MMHG | RESPIRATION RATE: 18 BRPM | TEMPERATURE: 98 F | HEART RATE: 82 BPM | SYSTOLIC BLOOD PRESSURE: 122 MMHG | OXYGEN SATURATION: 99 %

## 2020-12-09 NOTE — BEHAVIORAL HEALTH ASSESSMENT NOTE - NSBHSUICPROTECTFACT_PSY_A_CORE
Responsibility to family and others/Future oriented/Positive therapeutic relationships/Identifies reasons for living/Supportive social network or family
Negative

## 2020-12-23 PROBLEM — N39.0 ACUTE UTI: Status: RESOLVED | Noted: 2020-01-17 | Resolved: 2020-12-23

## 2020-12-27 ENCOUNTER — INPATIENT (INPATIENT)
Facility: HOSPITAL | Age: 60
LOS: 10 days | Discharge: HOME | DRG: 73 | End: 2021-01-07
Attending: INTERNAL MEDICINE | Admitting: INTERNAL MEDICINE
Payer: MEDICAID

## 2020-12-27 VITALS
HEIGHT: 62 IN | RESPIRATION RATE: 19 BRPM | WEIGHT: 119.93 LBS | HEART RATE: 88 BPM | DIASTOLIC BLOOD PRESSURE: 113 MMHG | OXYGEN SATURATION: 96 % | SYSTOLIC BLOOD PRESSURE: 215 MMHG

## 2020-12-27 DIAGNOSIS — R11.2 NAUSEA WITH VOMITING, UNSPECIFIED: ICD-10-CM

## 2020-12-27 DIAGNOSIS — Z98.890 OTHER SPECIFIED POSTPROCEDURAL STATES: Chronic | ICD-10-CM

## 2020-12-27 DIAGNOSIS — Z98.41 CATARACT EXTRACTION STATUS, RIGHT EYE: Chronic | ICD-10-CM

## 2020-12-27 DIAGNOSIS — Z89.512 ACQUIRED ABSENCE OF LEFT LEG BELOW KNEE: Chronic | ICD-10-CM

## 2020-12-27 DIAGNOSIS — Z98.42 CATARACT EXTRACTION STATUS, LEFT EYE: Chronic | ICD-10-CM

## 2020-12-27 LAB
ACETONE SERPL-MCNC: ABNORMAL
ALBUMIN SERPL ELPH-MCNC: 3.2 G/DL — LOW (ref 3.5–5)
ALBUMIN SERPL ELPH-MCNC: 4 G/DL — SIGNIFICANT CHANGE UP (ref 3.5–5)
ALP SERPL-CCNC: 120 U/L — SIGNIFICANT CHANGE UP (ref 40–120)
ALP SERPL-CCNC: 144 U/L — HIGH (ref 40–120)
ALT FLD-CCNC: 10 U/L DA — SIGNIFICANT CHANGE UP (ref 10–60)
ALT FLD-CCNC: 13 U/L DA — SIGNIFICANT CHANGE UP (ref 10–60)
ANION GAP SERPL CALC-SCNC: 19 MMOL/L — HIGH (ref 5–17)
ANION GAP SERPL CALC-SCNC: 19 MMOL/L — HIGH (ref 5–17)
AST SERPL-CCNC: 17 U/L — SIGNIFICANT CHANGE UP (ref 10–40)
AST SERPL-CCNC: 4 U/L — LOW (ref 10–40)
BASOPHILS # BLD AUTO: 0.06 K/UL — SIGNIFICANT CHANGE UP (ref 0–0.2)
BASOPHILS NFR BLD AUTO: 1 % — SIGNIFICANT CHANGE UP (ref 0–2)
BILIRUB SERPL-MCNC: 0.4 MG/DL — SIGNIFICANT CHANGE UP (ref 0.2–1.2)
BILIRUB SERPL-MCNC: 0.7 MG/DL — SIGNIFICANT CHANGE UP (ref 0.2–1.2)
BUN SERPL-MCNC: 88 MG/DL — HIGH (ref 7–18)
BUN SERPL-MCNC: 90 MG/DL — HIGH (ref 7–18)
CALCIUM SERPL-MCNC: 7.4 MG/DL — LOW (ref 8.4–10.5)
CALCIUM SERPL-MCNC: 8.6 MG/DL — SIGNIFICANT CHANGE UP (ref 8.4–10.5)
CHLORIDE SERPL-SCNC: 105 MMOL/L — SIGNIFICANT CHANGE UP (ref 96–108)
CHLORIDE SERPL-SCNC: 97 MMOL/L — SIGNIFICANT CHANGE UP (ref 96–108)
CO2 SERPL-SCNC: 15 MMOL/L — LOW (ref 22–31)
CO2 SERPL-SCNC: 19 MMOL/L — LOW (ref 22–31)
CREAT SERPL-MCNC: 18.1 MG/DL — HIGH (ref 0.5–1.3)
CREAT SERPL-MCNC: 19.8 MG/DL — HIGH (ref 0.5–1.3)
EOSINOPHIL # BLD AUTO: 0.12 K/UL — SIGNIFICANT CHANGE UP (ref 0–0.5)
EOSINOPHIL NFR BLD AUTO: 2 % — SIGNIFICANT CHANGE UP (ref 0–6)
GLUCOSE BLDC GLUCOMTR-MCNC: 68 MG/DL — LOW (ref 70–99)
GLUCOSE SERPL-MCNC: 68 MG/DL — LOW (ref 70–99)
GLUCOSE SERPL-MCNC: 89 MG/DL — SIGNIFICANT CHANGE UP (ref 70–99)
HCT VFR BLD CALC: 39.8 % — SIGNIFICANT CHANGE UP (ref 39–50)
HGB BLD-MCNC: 12.6 G/DL — LOW (ref 13–17)
LACTATE SERPL-SCNC: 0.9 MMOL/L — SIGNIFICANT CHANGE UP (ref 0.7–2)
LIDOCAIN IGE QN: 139 U/L — SIGNIFICANT CHANGE UP (ref 73–393)
LYMPHOCYTES # BLD AUTO: 0.12 K/UL — LOW (ref 1–3.3)
LYMPHOCYTES # BLD AUTO: 2 % — LOW (ref 13–44)
MCHC RBC-ENTMCNC: 28.6 PG — SIGNIFICANT CHANGE UP (ref 27–34)
MCHC RBC-ENTMCNC: 31.7 GM/DL — LOW (ref 32–36)
MCV RBC AUTO: 90.5 FL — SIGNIFICANT CHANGE UP (ref 80–100)
MONOCYTES # BLD AUTO: 0.18 K/UL — SIGNIFICANT CHANGE UP (ref 0–0.9)
MONOCYTES NFR BLD AUTO: 3 % — SIGNIFICANT CHANGE UP (ref 2–14)
NEUTROPHILS # BLD AUTO: 5.08 K/UL — SIGNIFICANT CHANGE UP (ref 1.8–7.4)
NEUTROPHILS NFR BLD AUTO: 87 % — HIGH (ref 43–77)
PLATELET # BLD AUTO: 195 K/UL — SIGNIFICANT CHANGE UP (ref 150–400)
POTASSIUM SERPL-MCNC: 5.9 MMOL/L — HIGH (ref 3.5–5.3)
POTASSIUM SERPL-MCNC: 7.3 MMOL/L — CRITICAL HIGH (ref 3.5–5.3)
POTASSIUM SERPL-SCNC: 5.9 MMOL/L — HIGH (ref 3.5–5.3)
POTASSIUM SERPL-SCNC: 7.3 MMOL/L — CRITICAL HIGH (ref 3.5–5.3)
PROT SERPL-MCNC: 6.7 G/DL — SIGNIFICANT CHANGE UP (ref 6–8.3)
PROT SERPL-MCNC: 8.4 G/DL — HIGH (ref 6–8.3)
RBC # BLD: 4.4 M/UL — SIGNIFICANT CHANGE UP (ref 4.2–5.8)
RBC # FLD: 17.2 % — HIGH (ref 10.3–14.5)
SARS-COV-2 RNA SPEC QL NAA+PROBE: SIGNIFICANT CHANGE UP
SODIUM SERPL-SCNC: 135 MMOL/L — SIGNIFICANT CHANGE UP (ref 135–145)
SODIUM SERPL-SCNC: 139 MMOL/L — SIGNIFICANT CHANGE UP (ref 135–145)
WBC # BLD: 5.84 K/UL — SIGNIFICANT CHANGE UP (ref 3.8–10.5)
WBC # FLD AUTO: 5.84 K/UL — SIGNIFICANT CHANGE UP (ref 3.8–10.5)

## 2020-12-27 PROCEDURE — 74176 CT ABD & PELVIS W/O CONTRAST: CPT | Mod: 26

## 2020-12-27 PROCEDURE — 99285 EMERGENCY DEPT VISIT HI MDM: CPT

## 2020-12-27 PROCEDURE — 99222 1ST HOSP IP/OBS MODERATE 55: CPT

## 2020-12-27 RX ORDER — SODIUM ZIRCONIUM CYCLOSILICATE 10 G/10G
10 POWDER, FOR SUSPENSION ORAL ONCE
Refills: 0 | Status: COMPLETED | OUTPATIENT
Start: 2020-12-27 | End: 2020-12-27

## 2020-12-27 RX ORDER — CHOLECALCIFEROL (VITAMIN D3) 125 MCG
1 CAPSULE ORAL
Qty: 0 | Refills: 0 | DISCHARGE

## 2020-12-27 RX ORDER — DEXTROSE 50 % IN WATER 50 %
100 SYRINGE (ML) INTRAVENOUS ONCE
Refills: 0 | Status: DISCONTINUED | OUTPATIENT
Start: 2020-12-27 | End: 2020-12-27

## 2020-12-27 RX ORDER — DEXTROSE 50 % IN WATER 50 %
50 SYRINGE (ML) INTRAVENOUS ONCE
Refills: 0 | Status: COMPLETED | OUTPATIENT
Start: 2020-12-27 | End: 2020-12-27

## 2020-12-27 RX ORDER — METOPROLOL TARTRATE 50 MG
1 TABLET ORAL
Qty: 0 | Refills: 0 | DISCHARGE

## 2020-12-27 RX ORDER — ACETAMINOPHEN 500 MG
650 TABLET ORAL EVERY 6 HOURS
Refills: 0 | Status: DISCONTINUED | OUTPATIENT
Start: 2020-12-27 | End: 2021-01-07

## 2020-12-27 RX ORDER — ISOSORBIDE MONONITRATE 60 MG/1
1 TABLET, EXTENDED RELEASE ORAL
Qty: 0 | Refills: 0 | DISCHARGE

## 2020-12-27 RX ORDER — ONDANSETRON 8 MG/1
4 TABLET, FILM COATED ORAL ONCE
Refills: 0 | Status: COMPLETED | OUTPATIENT
Start: 2020-12-27 | End: 2020-12-27

## 2020-12-27 RX ORDER — METOPROLOL TARTRATE 50 MG
12.5 TABLET ORAL
Refills: 0 | Status: DISCONTINUED | OUTPATIENT
Start: 2020-12-27 | End: 2021-01-03

## 2020-12-27 RX ORDER — INSULIN LISPRO 100/ML
VIAL (ML) SUBCUTANEOUS
Refills: 0 | Status: DISCONTINUED | OUTPATIENT
Start: 2020-12-27 | End: 2020-12-28

## 2020-12-27 RX ORDER — OXYCODONE AND ACETAMINOPHEN 5; 325 MG/1; MG/1
1 TABLET ORAL EVERY 6 HOURS
Refills: 0 | Status: DISCONTINUED | OUTPATIENT
Start: 2020-12-27 | End: 2021-01-01

## 2020-12-27 RX ORDER — SODIUM CHLORIDE 9 MG/ML
1000 INJECTION INTRAMUSCULAR; INTRAVENOUS; SUBCUTANEOUS ONCE
Refills: 0 | Status: COMPLETED | OUTPATIENT
Start: 2020-12-27 | End: 2020-12-27

## 2020-12-27 RX ORDER — SEVELAMER CARBONATE 2400 MG/1
1600 POWDER, FOR SUSPENSION ORAL
Refills: 0 | Status: DISCONTINUED | OUTPATIENT
Start: 2020-12-27 | End: 2021-01-07

## 2020-12-27 RX ORDER — FOLIC ACID 0.8 MG
1 TABLET ORAL DAILY
Refills: 0 | Status: DISCONTINUED | OUTPATIENT
Start: 2020-12-27 | End: 2021-01-07

## 2020-12-27 RX ORDER — GABAPENTIN 400 MG/1
2 CAPSULE ORAL
Qty: 0 | Refills: 0 | DISCHARGE

## 2020-12-27 RX ORDER — METOCLOPRAMIDE HCL 10 MG
10 TABLET ORAL ONCE
Refills: 0 | Status: COMPLETED | OUTPATIENT
Start: 2020-12-27 | End: 2020-12-27

## 2020-12-27 RX ORDER — METOCLOPRAMIDE HCL 10 MG
5 TABLET ORAL THREE TIMES A DAY
Refills: 0 | Status: DISCONTINUED | OUTPATIENT
Start: 2020-12-27 | End: 2021-01-03

## 2020-12-27 RX ORDER — INSULIN NPH HUM/REG INSULIN HM 70-30/ML
5 VIAL (ML) SUBCUTANEOUS
Qty: 0 | Refills: 0 | DISCHARGE

## 2020-12-27 RX ORDER — PANTOPRAZOLE SODIUM 20 MG/1
40 TABLET, DELAYED RELEASE ORAL
Refills: 0 | Status: DISCONTINUED | OUTPATIENT
Start: 2020-12-27 | End: 2020-12-29

## 2020-12-27 RX ORDER — SIMVASTATIN 20 MG/1
1 TABLET, FILM COATED ORAL
Qty: 0 | Refills: 0 | DISCHARGE

## 2020-12-27 RX ORDER — NORTRIPTYLINE HYDROCHLORIDE 10 MG/1
10 CAPSULE ORAL DAILY
Refills: 0 | Status: DISCONTINUED | OUTPATIENT
Start: 2020-12-27 | End: 2021-01-07

## 2020-12-27 RX ORDER — HEPARIN SODIUM 5000 [USP'U]/ML
5000 INJECTION INTRAVENOUS; SUBCUTANEOUS EVERY 8 HOURS
Refills: 0 | Status: DISCONTINUED | OUTPATIENT
Start: 2020-12-27 | End: 2021-01-03

## 2020-12-27 RX ORDER — PREGABALIN 225 MG/1
1 CAPSULE ORAL
Qty: 0 | Refills: 0 | DISCHARGE

## 2020-12-27 RX ADMIN — Medication 0: at 23:55

## 2020-12-27 RX ADMIN — Medication 104 MILLIGRAM(S): at 16:14

## 2020-12-27 RX ADMIN — SODIUM CHLORIDE 1000 MILLILITER(S): 9 INJECTION INTRAMUSCULAR; INTRAVENOUS; SUBCUTANEOUS at 16:14

## 2020-12-27 RX ADMIN — ONDANSETRON 4 MILLIGRAM(S): 8 TABLET, FILM COATED ORAL at 16:14

## 2020-12-27 NOTE — H&P ADULT - HISTORY OF PRESENT ILLNESS
Patient is a 60 year old male from [place] [id number], with PMH of ESRD on HD TTS, HTN, DM, partially blind and s/p left BKA, who presented to the ED due to vomiting. Patient states that he was feeling fine until today when he started to have nausea and multiple episodes of vomiting. Patient unable to state exactly how many episodes he had. Denies any signs of blood as far as he knows. Patient also had some abdominal pain mainly located on RLQ but states it is improved now. Patient states that he has had similar episodes previously and has gone to hospital but unsure exactly about what is the cause of symptoms. Patient states he normally has HD sessions on T/T/S but due to holidays, schedule has been slightly changed and had last session on Friday. Patient states he was supposed to have HD today but did not go due to feeling sick. Currently, patient denies any chest pain, shortness of breath, headache, dizziness or abdominal pain.

## 2020-12-27 NOTE — H&P ADULT - NSICDXPASTMEDICALHX_GEN_ALL_CORE_FT
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

## 2020-12-27 NOTE — H&P ADULT - PROBLEM SELECTOR PLAN 6
patient with Hgb of 12.6 on admission   likely secondary to ESRD  f/u anemia panel   monitor CBC daily  continue home med of folic acid

## 2020-12-27 NOTE — H&P ADULT - ATTENDING COMMENTS
Pt seen and examined.  Pt seen on behalf of Dr. Alston  Case discussed with Attending Resident.    60 year old man with PMH of DM2, HTN, ESRD on HD( T,TH,SAT) with 1 week of right lower abdominal pain associated with multiple episodes of nausea and NBNB vomiting.  No fever, urinary symptoms and no change in mental status.    Vital Signs Last 24 Hrs  T(C): --  T(F): --  HR: 88 (27 Dec 2020 14:17) (88 - 88)  BP: 215/113 (27 Dec 2020 14:17) (215/113 - 215/113)  RR: 19 (27 Dec 2020 14:17) (19 - 19)  SpO2: 96% (27 Dec 2020 14:17) (96% - 96%)    Exams    Labs                        12.6   5.84  )-----------( 195      ( 27 Dec 2020 16:06 )             39.8     12-27    135  |  97  |  90<H>  ----------------------------<  89  7.3<HH>   |  19<L>  |  19.80<H>    Ca    8.6      27 Dec 2020 16:07    TPro  8.4<H>  /  Alb  4.0  /  TBili  0.7  /  DBili  x   /  AST  17  /  ALT  13  /  AlkPhos  144<H>  12-27    CT abdomen  Thickened distal esophagus again noted suggesting esophagitis. Direct visualization recommended when clinically feasible. Mild diffuse bladder wall thickening.    Impression  60 year old man with hx as above presenting with acute abdominal pain with no obvious surgical abdomen on clinical and radiological findings. However, there are acute findings of high anion gap metabolic acidosis with hyperkalemia which appears more from dehydration with emesis than DKA. HE is s/p 1L bolus of IVF and awaiting further chemistry assay.   BP is also elevated which might be stress related     - Abdominal pain ? cause   Pt no longer makes urine; diffuse bladder wall thickening noted however.  Low likelihood for UTI  Pain control    - High anion gap Metabolic acidosis  Likely from dehydration  s/p judicious fluid due to renal status  repeat chemistry    - Hyperkalemia  moderate hemolysis  labs being repeated by ED   Will follow labs     - Uncontrolled HTN  resume home meds Pt seen and examined.  Pt seen on behalf of Dr. Alston  Case discussed with Attending Resident.    60 year old man with PMH of DM2 with ?gastroparesis, HTN, ESRD on HD( T,TH,SAT) with 1 week of right lower abdominal pain associated with multiple episodes of nausea and NBNB vomiting similar to prior episodes   No fever, urinary symptoms and no change in mental status.    Vital Signs Last 24 Hrs  T(C): 36.6 (28 Dec 2020 01:43), Max: 36.9 (27 Dec 2020 20:28)  T(F): 97.8 (28 Dec 2020 01:43), Max: 98.5 (27 Dec 2020 20:28)  HR: 110 (28 Dec 2020 05:10) (88 - 112)  BP: 201/99 (28 Dec 2020 05:10) (170/81 - 215/113)  RR: 19 (28 Dec 2020 01:43) (19 - 20)  SpO2: 100% (28 Dec 2020 01:43) (96% - 100%)    Exams  Pt seen in bed awake, alert, grouchy and asking to be let alone to rest  Appears oriented X 3  Denies any complaints and refusing exams  Green bag beside home empty of vomitus.    Labs                        12.6   5.84  )-----------( 195      ( 27 Dec 2020 16:06 )             39.8     12-27    135  |  97  |  90<H>  ----------------------------<  89  7.3<HH>   |  19<L>  |  19.80<H>    Ca    8.6      27 Dec 2020 16:07    TPro  8.4<H>  /  Alb  4.0  /  TBili  0.7  /  DBili  x   /  AST  17  /  ALT  13  /  AlkPhos  144<H>  12-27    CT abdomen  Thickened distal esophagus again noted suggesting esophagitis. Direct visualization recommended when clinically feasible. Mild diffuse bladder wall thickening.    Impression  60 year old man with hx as above presenting with acute abdominal pain with no obvious surgical abdomen on clinical and radiological findings. However, there are acute findings of high anion gap metabolic acidosis with hyperkalemia which appears more from dehydration with emesis than DKA. HE is s/p 1L bolus of IVF and awaiting further chemistry assay.   BP is also elevated which might be stress related     - Abdominal pain with multiple emetic episodes ? cause   Likely related to underlying diabetic gastroparesis.  Symptomatic control.  Pt no longer makes urine; diffuse bladder wall thickening noted however.  Low likelihood for UTI. Hold off antibiotics  Pain control    - High anion gap Metabolic acidosis  Likely from dehydration  s/p judicious fluid due to renal status  repeat chemistry and monitor  Missed last HD session; need HD    - Hyperkalemia  moderate hemolysis  labs being repeated by ED   Will follow labs   IF still elevated, acute management    - Uncontrolled HTN  resume home meds    ESRD on HD  - Consult Dr Mosher for HD today

## 2020-12-27 NOTE — H&P ADULT - ASSESSMENT
Patient is a 60 year old male from [place] [address], with PMH of ESRD on HD TTS, HTN, DM, partially blind and s/p left BKA, who presented to the ED due to vomiting.     Hgb of 12.6. Potassium of 7.3 with repeat of 5.9. Creatinine of 19.80. COVID negative. CT abdomen showing thickened distal esophagus. Mild diffuse bladder wall thickening. Given NS bolus, LR bolus, lokelma, reglan and zofran in ED.

## 2020-12-27 NOTE — H&P ADULT - NSICDXPASTSURGICALHX_GEN_ALL_CORE_FT
PAST SURGICAL HISTORY:  Below knee amputation status, left     History of left cataract extraction     History of right cataract extraction     S/P arteriovenous (AV) fistula creation 2018

## 2020-12-27 NOTE — ED ADULT NURSE NOTE - NSIMPLEMENTINTERV_GEN_ALL_ED
Implemented All Fall with Harm Risk Interventions:  [place] to call system. Call bell, personal items and telephone within reach. Instruct patient to call for assistance. Room bathroom lighting operational. Non-slip footwear when patient is off stretcher. Physically safe environment: no spills, clutter or unnecessary equipment. Stretcher in lowest position, wheels locked, appropriate side rails in place. Provide visual cue, wrist band, yellow gown, etc. Monitor gait and stability. Monitor for mental status changes and reorient to person, place, and time. Review medications for side effects contributing to fall risk. Reinforce activity limits and safety measures with patient and family. Provide visual clues: red socks.

## 2020-12-27 NOTE — H&P ADULT - PROBLEM SELECTOR PLAN 1
patient presented due to vomiting  continues to have vomiting at this time  will keep NPO for now and advance diet as tolerated  given NS bolus and LR bolus in ED; will hold off on further IVF in setting of ESRD  FS q6 while NPO  zofran PRN  continue home med of reglan patient presented due to vomiting  continues to have vomiting at this time  may be due to diabetic gastroparesis  will keep NPO for now and advance diet as tolerated  given NS bolus and LR bolus in ED; will hold off on further IVF in setting of ESRD  FS q6 while NPO  zofran PRN  continue home med of reglan

## 2020-12-27 NOTE — H&P ADULT - PROBLEM SELECTOR PLAN 2
noted to have potassium of 7.3 with repeat of 5.9   given dose of lokelma in ED  will continue to monitor potassium   nephro Dr. Mosher consulted

## 2020-12-27 NOTE — ED PROVIDER NOTE - CLINICAL SUMMARY MEDICAL DECISION MAKING FREE TEXT BOX
60 year old male with nausea, vomiting, and intermittent right sided abdominal pain. Concerned for appendicitis vs gastroparesis among other causes. Will order labs, CT abd/P, and reassess.

## 2020-12-27 NOTE — H&P ADULT - PROBLEM SELECTOR PLAN 3
patient on HD T/T/S  last HD on friday and was supposed to have session on Sunday but did not get to go   nephro Dr. Mosher consulted  continue renvela   monitor BMP daily   SW consult

## 2020-12-27 NOTE — H&P ADULT - PROBLEM SELECTOR PLAN 7
continue home med of metoprolol with HOLD parameters  monitor BP and adjust meds as needed  nephro, Dr. Mosher consulted

## 2020-12-27 NOTE — ED PROVIDER NOTE - OBJECTIVE STATEMENT
60 year old male with PMHx of DM, HTN, ESRD, presenting here with 1 week of intermittent right sided abdominal pain with unrelated nausea and vomiting several times a day. Patient states that he has had similar in the past with no cause for pain found. Patient denies fever, chest pain, shortness of breath, cough, or any other acute complaints. Patient is legally blind.

## 2020-12-27 NOTE — H&P ADULT - PROBLEM SELECTOR PLAN 4
patient on insulin at home novolin 70/30 and SSI   will monitor with SSI q6 while NPO   f/u A1c   monitor blood sugars and adjust insulin as needed

## 2020-12-28 DIAGNOSIS — R11.2 NAUSEA WITH VOMITING, UNSPECIFIED: ICD-10-CM

## 2020-12-28 DIAGNOSIS — D64.9 ANEMIA, UNSPECIFIED: ICD-10-CM

## 2020-12-28 DIAGNOSIS — E87.5 HYPERKALEMIA: ICD-10-CM

## 2020-12-28 DIAGNOSIS — N18.6 END STAGE RENAL DISEASE: ICD-10-CM

## 2020-12-28 DIAGNOSIS — E83.39 OTHER DISORDERS OF PHOSPHORUS METABOLISM: ICD-10-CM

## 2020-12-28 DIAGNOSIS — I10 ESSENTIAL (PRIMARY) HYPERTENSION: ICD-10-CM

## 2020-12-28 DIAGNOSIS — I16.0 HYPERTENSIVE URGENCY: ICD-10-CM

## 2020-12-28 DIAGNOSIS — Z29.9 ENCOUNTER FOR PROPHYLACTIC MEASURES, UNSPECIFIED: ICD-10-CM

## 2020-12-28 DIAGNOSIS — E11.9 TYPE 2 DIABETES MELLITUS WITHOUT COMPLICATIONS: ICD-10-CM

## 2020-12-28 DIAGNOSIS — E87.2 ACIDOSIS: ICD-10-CM

## 2020-12-28 DIAGNOSIS — E78.5 HYPERLIPIDEMIA, UNSPECIFIED: ICD-10-CM

## 2020-12-28 LAB
24R-OH-CALCIDIOL SERPL-MCNC: 35.1 NG/ML — SIGNIFICANT CHANGE UP (ref 30–80)
A1C WITH ESTIMATED AVERAGE GLUCOSE RESULT: 5.9 % — HIGH (ref 4–5.6)
ACETONE SERPL-MCNC: NEGATIVE — SIGNIFICANT CHANGE UP
ANION GAP SERPL CALC-SCNC: 20 MMOL/L — HIGH (ref 5–17)
APTT BLD: 31 SEC — SIGNIFICANT CHANGE UP (ref 27.5–35.5)
BASOPHILS # BLD AUTO: 0.02 K/UL — SIGNIFICANT CHANGE UP (ref 0–0.2)
BASOPHILS NFR BLD AUTO: 0.3 % — SIGNIFICANT CHANGE UP (ref 0–2)
BUN SERPL-MCNC: 91 MG/DL — HIGH (ref 7–18)
CALCIUM SERPL-MCNC: 8 MG/DL — LOW (ref 8.4–10.5)
CHLORIDE SERPL-SCNC: 97 MMOL/L — SIGNIFICANT CHANGE UP (ref 96–108)
CHOLEST SERPL-MCNC: 146 MG/DL — SIGNIFICANT CHANGE UP
CO2 SERPL-SCNC: 22 MMOL/L — SIGNIFICANT CHANGE UP (ref 22–31)
CREAT SERPL-MCNC: 19.9 MG/DL — HIGH (ref 0.5–1.3)
EOSINOPHIL # BLD AUTO: 0.06 K/UL — SIGNIFICANT CHANGE UP (ref 0–0.5)
EOSINOPHIL NFR BLD AUTO: 1 % — SIGNIFICANT CHANGE UP (ref 0–6)
ESTIMATED AVERAGE GLUCOSE: 123 MG/DL — HIGH (ref 68–114)
FERRITIN SERPL-MCNC: 1051 NG/ML — HIGH (ref 30–400)
FOLATE SERPL-MCNC: >20 NG/ML — SIGNIFICANT CHANGE UP
GLUCOSE BLDC GLUCOMTR-MCNC: 142 MG/DL — HIGH (ref 70–99)
GLUCOSE BLDC GLUCOMTR-MCNC: 149 MG/DL — HIGH (ref 70–99)
GLUCOSE BLDC GLUCOMTR-MCNC: 64 MG/DL — LOW (ref 70–99)
GLUCOSE BLDC GLUCOMTR-MCNC: 84 MG/DL — SIGNIFICANT CHANGE UP (ref 70–99)
GLUCOSE BLDC GLUCOMTR-MCNC: 86 MG/DL — SIGNIFICANT CHANGE UP (ref 70–99)
GLUCOSE BLDC GLUCOMTR-MCNC: 93 MG/DL — SIGNIFICANT CHANGE UP (ref 70–99)
GLUCOSE SERPL-MCNC: 84 MG/DL — SIGNIFICANT CHANGE UP (ref 70–99)
HCT VFR BLD CALC: 34.5 % — LOW (ref 39–50)
HDLC SERPL-MCNC: 40 MG/DL — LOW
HGB BLD-MCNC: 10.8 G/DL — LOW (ref 13–17)
IMM GRANULOCYTES NFR BLD AUTO: 0.5 % — SIGNIFICANT CHANGE UP (ref 0–1.5)
INR BLD: 0.93 RATIO — SIGNIFICANT CHANGE UP (ref 0.88–1.16)
IRON SATN MFR SERPL: 34 % — SIGNIFICANT CHANGE UP (ref 20–55)
IRON SATN MFR SERPL: 50 UG/DL — LOW (ref 65–170)
LACTATE SERPL-SCNC: 1.1 MMOL/L — SIGNIFICANT CHANGE UP (ref 0.7–2)
LIPID PNL WITH DIRECT LDL SERPL: 67 MG/DL — SIGNIFICANT CHANGE UP
LYMPHOCYTES # BLD AUTO: 0.52 K/UL — LOW (ref 1–3.3)
LYMPHOCYTES # BLD AUTO: 8.4 % — LOW (ref 13–44)
MAGNESIUM SERPL-MCNC: 2.8 MG/DL — HIGH (ref 1.6–2.6)
MCHC RBC-ENTMCNC: 28.7 PG — SIGNIFICANT CHANGE UP (ref 27–34)
MCHC RBC-ENTMCNC: 31.3 GM/DL — LOW (ref 32–36)
MCV RBC AUTO: 91.8 FL — SIGNIFICANT CHANGE UP (ref 80–100)
MONOCYTES # BLD AUTO: 0.43 K/UL — SIGNIFICANT CHANGE UP (ref 0–0.9)
MONOCYTES NFR BLD AUTO: 7 % — SIGNIFICANT CHANGE UP (ref 2–14)
NEUTROPHILS # BLD AUTO: 5.1 K/UL — SIGNIFICANT CHANGE UP (ref 1.8–7.4)
NEUTROPHILS NFR BLD AUTO: 82.8 % — HIGH (ref 43–77)
NON HDL CHOLESTEROL: 106 MG/DL — SIGNIFICANT CHANGE UP
NRBC # BLD: 0 /100 WBCS — SIGNIFICANT CHANGE UP (ref 0–0)
PHOSPHATE SERPL-MCNC: 6.6 MG/DL — HIGH (ref 2.5–4.5)
PLATELET # BLD AUTO: 185 K/UL — SIGNIFICANT CHANGE UP (ref 150–400)
POTASSIUM SERPL-MCNC: 5.5 MMOL/L — HIGH (ref 3.5–5.3)
POTASSIUM SERPL-SCNC: 5.5 MMOL/L — HIGH (ref 3.5–5.3)
PROTHROM AB SERPL-ACNC: 11.1 SEC — SIGNIFICANT CHANGE UP (ref 10.6–13.6)
RBC # BLD: 3.76 M/UL — LOW (ref 4.2–5.8)
RBC # FLD: 17.6 % — HIGH (ref 10.3–14.5)
SARS-COV-2 IGG SERPL QL IA: NEGATIVE — SIGNIFICANT CHANGE UP
SARS-COV-2 IGM SERPL IA-ACNC: <0.1 INDEX — SIGNIFICANT CHANGE UP
SODIUM SERPL-SCNC: 139 MMOL/L — SIGNIFICANT CHANGE UP (ref 135–145)
TIBC SERPL-MCNC: 149 UG/DL — LOW (ref 250–450)
TRIGL SERPL-MCNC: 195 MG/DL — HIGH
TSH SERPL-MCNC: 1.45 UU/ML — SIGNIFICANT CHANGE UP (ref 0.34–4.82)
UIBC SERPL-MCNC: 99 UG/DL — LOW (ref 110–370)
VIT B12 SERPL-MCNC: >2000 PG/ML — HIGH (ref 232–1245)
WBC # BLD: 6.16 K/UL — SIGNIFICANT CHANGE UP (ref 3.8–10.5)
WBC # FLD AUTO: 6.16 K/UL — SIGNIFICANT CHANGE UP (ref 3.8–10.5)

## 2020-12-28 PROCEDURE — 99233 SBSQ HOSP IP/OBS HIGH 50: CPT | Mod: GC

## 2020-12-28 PROCEDURE — 71045 X-RAY EXAM CHEST 1 VIEW: CPT | Mod: 26

## 2020-12-28 RX ORDER — SODIUM CHLORIDE 9 MG/ML
500 INJECTION, SOLUTION INTRAVENOUS ONCE
Refills: 0 | Status: COMPLETED | OUTPATIENT
Start: 2020-12-28 | End: 2020-12-28

## 2020-12-28 RX ORDER — SIMVASTATIN 20 MG/1
40 TABLET, FILM COATED ORAL AT BEDTIME
Refills: 0 | Status: DISCONTINUED | OUTPATIENT
Start: 2020-12-28 | End: 2021-01-07

## 2020-12-28 RX ORDER — ERYTHROPOIETIN 10000 [IU]/ML
4000 INJECTION, SOLUTION INTRAVENOUS; SUBCUTANEOUS
Refills: 0 | Status: DISCONTINUED | OUTPATIENT
Start: 2020-12-28 | End: 2021-01-03

## 2020-12-28 RX ORDER — HYDRALAZINE HCL 50 MG
5 TABLET ORAL ONCE
Refills: 0 | Status: COMPLETED | OUTPATIENT
Start: 2020-12-28 | End: 2020-12-28

## 2020-12-28 RX ORDER — CALCIUM ACETATE 667 MG
667 TABLET ORAL
Refills: 0 | Status: DISCONTINUED | OUTPATIENT
Start: 2020-12-28 | End: 2021-01-07

## 2020-12-28 RX ORDER — INSULIN LISPRO 100/ML
VIAL (ML) SUBCUTANEOUS EVERY 6 HOURS
Refills: 0 | Status: DISCONTINUED | OUTPATIENT
Start: 2020-12-28 | End: 2020-12-29

## 2020-12-28 RX ORDER — ONDANSETRON 8 MG/1
4 TABLET, FILM COATED ORAL ONCE
Refills: 0 | Status: COMPLETED | OUTPATIENT
Start: 2020-12-28 | End: 2020-12-28

## 2020-12-28 RX ORDER — ONDANSETRON 8 MG/1
4 TABLET, FILM COATED ORAL EVERY 6 HOURS
Refills: 0 | Status: DISCONTINUED | OUTPATIENT
Start: 2020-12-28 | End: 2021-01-07

## 2020-12-28 RX ADMIN — Medication 50 MILLILITER(S): at 00:01

## 2020-12-28 RX ADMIN — NORTRIPTYLINE HYDROCHLORIDE 10 MILLIGRAM(S): 10 CAPSULE ORAL at 13:17

## 2020-12-28 RX ADMIN — Medication 5 MILLIGRAM(S): at 05:20

## 2020-12-28 RX ADMIN — HEPARIN SODIUM 5000 UNIT(S): 5000 INJECTION INTRAVENOUS; SUBCUTANEOUS at 05:21

## 2020-12-28 RX ADMIN — Medication 0.1 MILLIGRAM(S): at 09:41

## 2020-12-28 RX ADMIN — HEPARIN SODIUM 5000 UNIT(S): 5000 INJECTION INTRAVENOUS; SUBCUTANEOUS at 22:16

## 2020-12-28 RX ADMIN — SODIUM CHLORIDE 1000 MILLILITER(S): 9 INJECTION, SOLUTION INTRAVENOUS at 01:03

## 2020-12-28 RX ADMIN — ONDANSETRON 4 MILLIGRAM(S): 8 TABLET, FILM COATED ORAL at 05:21

## 2020-12-28 RX ADMIN — ONDANSETRON 4 MILLIGRAM(S): 8 TABLET, FILM COATED ORAL at 02:06

## 2020-12-28 RX ADMIN — SEVELAMER CARBONATE 1600 MILLIGRAM(S): 2400 POWDER, FOR SUSPENSION ORAL at 13:18

## 2020-12-28 RX ADMIN — Medication 100 MILLIGRAM(S): at 22:18

## 2020-12-28 RX ADMIN — Medication 12.5 MILLIGRAM(S): at 05:18

## 2020-12-28 RX ADMIN — ERYTHROPOIETIN 4000 UNIT(S): 10000 INJECTION, SOLUTION INTRAVENOUS; SUBCUTANEOUS at 18:20

## 2020-12-28 RX ADMIN — PANTOPRAZOLE SODIUM 40 MILLIGRAM(S): 20 TABLET, DELAYED RELEASE ORAL at 05:21

## 2020-12-28 RX ADMIN — Medication 650 MILLIGRAM(S): at 18:23

## 2020-12-28 RX ADMIN — SODIUM ZIRCONIUM CYCLOSILICATE 10 GRAM(S): 10 POWDER, FOR SUSPENSION ORAL at 00:23

## 2020-12-28 RX ADMIN — SIMVASTATIN 40 MILLIGRAM(S): 20 TABLET, FILM COATED ORAL at 22:16

## 2020-12-28 RX ADMIN — Medication 5 MILLIGRAM(S): at 06:43

## 2020-12-28 RX ADMIN — Medication 5 MILLIGRAM(S): at 22:16

## 2020-12-28 NOTE — PROGRESS NOTE ADULT - PROBLEM SELECTOR PLAN 2
High anion gap metabolic acidosis due to ESRD.  Anion gap of 20  Normal Bicarb and vbg was normal  Serum ketones positive

## 2020-12-28 NOTE — PROGRESS NOTE ADULT - PROBLEM SELECTOR PLAN 1
Pt BP was 201/99  s/p metoprolol 12.5mg q12 and resumed his home medication.  started on Clonidine  Will monitor BP

## 2020-12-28 NOTE — PROGRESS NOTE ADULT - PROBLEM SELECTOR PLAN 5
patient on HD T/T/S  last HD on Thursday and was supposed to have session on Saturday but did not get to go   HD today in the afternoon  nephro Dr. Mosher  continue renvela   monitor BMP daily   SW consult

## 2020-12-28 NOTE — PROGRESS NOTE ADULT - SUBJECTIVE AND OBJECTIVE BOX
Patient is a 60y old  Male who presents with a chief complaint of vomiting (27 Dec 2020 20:37)      INTERVAL HPI/OVERNIGHT EVENTS: Pt is coughing today. Patient denies fevers/chills/nausea/vomiting. No diarrhea, abdominal pain.          T(C): 36.7 (12-28-20 @ 09:11), Max: 36.9 (12-27-20 @ 20:28)  HR: 95 (12-28-20 @ 09:11) (88 - 112)  BP: 170/89 (12-28-20 @ 09:11) (170/81 - 215/113)  RR: 17 (12-28-20 @ 09:11) (17 - 20)  SpO2: 100% (12-28-20 @ 09:11) (96% - 100%)  Wt(kg): --Vital Signs Last 24 Hrs  T(C): 36.7 (28 Dec 2020 09:11), Max: 36.9 (27 Dec 2020 20:28)  T(F): 98 (28 Dec 2020 09:11), Max: 98.5 (27 Dec 2020 20:28)  HR: 95 (28 Dec 2020 09:11) (88 - 112)  BP: 170/89 (28 Dec 2020 09:11) (170/81 - 215/113)  BP(mean): --  RR: 17 (28 Dec 2020 09:11) (17 - 20)  SpO2: 100% (28 Dec 2020 09:11) (96% - 100%)  fish (Rash)  liver (Anaphylaxis)  No Known Drug Allergies      PHYSICAL EXAM:  GENERAL: NAD, well-groomed, well-developed  HEAD:  Atraumatic, Normocephalic  EYES: EOMI,conjunctiva and sclera clear, Partially blind bilaterally.   ENT: No tonsillar erythema, exudates, or enlargement; Moist mucous membranes, Good dentition, No lesions  NECK: Supple, No JVD, Normal thyroid  NERVOUS SYSTEM:  Alert & Oriented X3, Good concentration; Motor Strength 5/5 B/L upper and lower extremities; DTRs 2+ intact and symmetric  CHEST/LUNG: Clear to percussion bilaterally; No rales, rhonchi, wheezing, or rubs  HEART: Regular rate and rhythm; No murmurs, rubs, or gallops  ABDOMEN: Soft, Nontender, Nondistended; Bowel sounds present  EXTREMITIES:  2+ Peripheral Pulses, No clubbing, cyanosis, or edema, left sided BKA.   LYMPH: No lymphadenopathy noted  SKIN: No rashes or lesions    Consultant(s) Notes Reviewed:  [x ] YES  [ ] NO  Care Discussed with Consultants/Other Providers [ x] YES  [ ] NO    LABS:    LABS:                        10.8   6.16  )-----------( 185      ( 28 Dec 2020 06:54 )             34.5                         12.6   5.84  )-----------( 195      ( 27 Dec 2020 16:06 )             39.8     12-28    139  |  97  |  91<H>  ----------------------------<  84  5.5<H>   |  22  |  19.90<H>    Ca    8.0<L>      28 Dec 2020 06:54  Phos  6.6     12-28  Mg     2.8     12-28    TPro  6.7  /  Alb  3.2<L>  /  TBili  0.4  /  DBili  x   /  AST  4<L>  /  ALT  10  /  AlkPhos  120  12-27    PT/INR - ( 28 Dec 2020 06:54 )   PT: 11.1 sec;   INR: 0.93 ratio         PTT - ( 28 Dec 2020 06:54 )  PTT:31.0 sec    LIVER FUNCTIONS - ( 27 Dec 2020 21:21 )  Alb: 3.2 g/dL / Pro: 6.7 g/dL / ALK PHOS: 120 U/L / ALT: 10 U/L DA / AST: 4 U/L / GGT: x         LIVER FUNCTIONS - ( 27 Dec 2020 16:07 )  Alb: 4.0 g/dL / Pro: 8.4 g/dL / ALK PHOS: 144 U/L / ALT: 13 U/L DA / AST: 17 U/L / GGT: x           Lipase, Serum: 139 U/L (12-27-20 @ 16:07)  Lactate, Blood: 0.9 mmol/L (12-27-20 @ 16:06)        RADIOLOGY & ADDITIONAL TESTS:    Imaging Personally Reviewed:  [ ] YES  [ ] NO  acetaminophen   Tablet .. 650 milliGRAM(s) Oral every 6 hours PRN  calcium acetate 667 milliGRAM(s) Oral three times a day with meals  cloNIDine 0.1 milliGRAM(s) Oral daily  epoetin beverley-epbx (RETACRIT) Injectable 4000 Unit(s) IV Push <User Schedule>  folic acid 1 milliGRAM(s) Oral daily  heparin   Injectable 5000 Unit(s) SubCutaneous every 8 hours  insulin lispro (ADMELOG) corrective regimen sliding scale   SubCutaneous every 6 hours  metoclopramide 5 milliGRAM(s) Oral three times a day  metoprolol tartrate 12.5 milliGRAM(s) Oral two times a day  nortriptyline 10 milliGRAM(s) Oral daily  ondansetron Injectable 4 milliGRAM(s) IV Push every 6 hours PRN  oxycodone    5 mG/acetaminophen 325 mG 1 Tablet(s) Oral every 6 hours PRN  pantoprazole    Tablet 40 milliGRAM(s) Oral before breakfast  sevelamer carbonate 1600 milliGRAM(s) Oral three times a day with meals  simvastatin 40 milliGRAM(s) Oral at bedtime

## 2020-12-28 NOTE — PROGRESS NOTE ADULT - ATTENDING COMMENTS
Patient seen this morning. refused answering my questions and exam.    60 M  from [facility] with PMH of ESRD on HD TTS, HTN, DM, partially blind and s/p left BKA brought to ED due to vomiting and RLQ abdominal pain found t be in metabolic derangement. has normal wbc with left shift. CT A/P without obvious abnormality. Pt is not producing urine due to ESRD. Noted to be coughing COVID PCR negative. Will Check CXR and blood cultures. Metabolic derangement secondary to missed HD.    A/P  Anion gap metabolic acidosis secondary to ESRD, Fs within normal appears to have tight glycemic control  Hypertensive urgency secondary to missed HD  ESRD on HD  Hyperphosphatemia  Hyperkalemia  Anemia secondary to ESRD  T2DM with A1c 5.9   Vomiting resolved tolerating diet    Plan  HD per Nephrology  clonidine was added per Nephrology recommendation  F/u CXR, obtain blood cultures if CXR positive for infiltrates will start antibiotics MEDICAL ATTENDING COVERING DR. Justin    Patient seen this morning. Pt  refused answering my questions and exam despite multiple attempts.    60 M  from [facility] with PMH of ESRD on HD TTS, HTN, DM, partially blind and s/p left BKA brought to ED due to vomiting and RLQ abdominal pain found t be in metabolic derangement. has normal wbc with left shift. CT A/P without obvious abnormality. Pt is not producing urine due to ESRD. Noted to be coughing COVID PCR negative. Will Check CXR and blood cultures. Metabolic derangement secondary to missed HD.    A/P  Anion gap metabolic acidosis secondary to ESRD, Fs within normal appears to have tight glycemic control  Hypertensive urgency secondary to missed HD, BP is better now  ESRD on HD  Hyperphosphatemia  Hyperkalemia  Anemia secondary to ESRD  T2DM with A1c 5.9   Vomiting resolved tolerating diet  s/p Left BKA    Plan  HD per Nephrology  clonidine was added per Nephrology recommendation  F/u CXR, obtain blood cultures if CXR positive for infiltrates will start antibiotics

## 2020-12-28 NOTE — PROGRESS NOTE ADULT - PROBLEM SELECTOR PLAN 6
patient with Hgb of 10.8   likely secondary to ESRD  On Epogen  monitor CBC daily  continue home med of folic acid

## 2020-12-28 NOTE — PROGRESS NOTE ADULT - PROBLEM SELECTOR PLAN 3
potassium of 5.5 and most likely due to ESRD.  Will get HD today  will continue to monitor potassium   nephro Dr. Mosher consulted

## 2020-12-28 NOTE — PROGRESS NOTE ADULT - ASSESSMENT
Patient is a 60 year old male from [place] [address], with PMH of ESRD on HD TTS, HTN, DM, partially blind and s/p left BKA, who presented to the ED due to vomiting.     Hgb of 12.6. Potassium of 7.3 with repeat of 5.9. Creatinine of 19.80. COVID negative. CT abdomen showing thickened distal esophagus. Mild diffuse bladder wall thickening. Given NS bolus, LR bolus, lokelma, reglan and zofran in ED.

## 2020-12-29 LAB
ALBUMIN SERPL ELPH-MCNC: 3.7 G/DL — SIGNIFICANT CHANGE UP (ref 3.5–5)
ALP SERPL-CCNC: 143 U/L — HIGH (ref 40–120)
ALT FLD-CCNC: 11 U/L DA — SIGNIFICANT CHANGE UP (ref 10–60)
ANION GAP SERPL CALC-SCNC: 19 MMOL/L — HIGH (ref 5–17)
AST SERPL-CCNC: 4 U/L — LOW (ref 10–40)
BILIRUB SERPL-MCNC: 0.7 MG/DL — SIGNIFICANT CHANGE UP (ref 0.2–1.2)
BUN SERPL-MCNC: 47 MG/DL — HIGH (ref 7–18)
CALCIUM SERPL-MCNC: 8.1 MG/DL — LOW (ref 8.4–10.5)
CHLORIDE SERPL-SCNC: 95 MMOL/L — LOW (ref 96–108)
CO2 SERPL-SCNC: 23 MMOL/L — SIGNIFICANT CHANGE UP (ref 22–31)
CREAT SERPL-MCNC: 14.4 MG/DL — HIGH (ref 0.5–1.3)
GLUCOSE BLDC GLUCOMTR-MCNC: 133 MG/DL — HIGH (ref 70–99)
GLUCOSE BLDC GLUCOMTR-MCNC: 153 MG/DL — HIGH (ref 70–99)
GLUCOSE BLDC GLUCOMTR-MCNC: 263 MG/DL — HIGH (ref 70–99)
GLUCOSE SERPL-MCNC: 138 MG/DL — HIGH (ref 70–99)
HAV IGM SER-ACNC: SIGNIFICANT CHANGE UP
HBV CORE IGM SER-ACNC: SIGNIFICANT CHANGE UP
HBV SURFACE AB SER-ACNC: REACTIVE
HBV SURFACE AG SER-ACNC: SIGNIFICANT CHANGE UP
HBV SURFACE AG SER-ACNC: SIGNIFICANT CHANGE UP
HCT VFR BLD CALC: 36.5 % — LOW (ref 39–50)
HCT VFR BLD CALC: 37.3 % — LOW (ref 39–50)
HCV AB S/CO SERPL IA: 0.07 S/CO — SIGNIFICANT CHANGE UP (ref 0–0.99)
HCV AB SERPL-IMP: SIGNIFICANT CHANGE UP
HGB BLD-MCNC: 11.4 G/DL — LOW (ref 13–17)
HGB BLD-MCNC: 11.8 G/DL — LOW (ref 13–17)
MAGNESIUM SERPL-MCNC: 2.5 MG/DL — SIGNIFICANT CHANGE UP (ref 1.6–2.6)
MCHC RBC-ENTMCNC: 28.3 PG — SIGNIFICANT CHANGE UP (ref 27–34)
MCHC RBC-ENTMCNC: 28.6 PG — SIGNIFICANT CHANGE UP (ref 27–34)
MCHC RBC-ENTMCNC: 31.2 GM/DL — LOW (ref 32–36)
MCHC RBC-ENTMCNC: 31.6 GM/DL — LOW (ref 32–36)
MCV RBC AUTO: 90.5 FL — SIGNIFICANT CHANGE UP (ref 80–100)
MCV RBC AUTO: 90.6 FL — SIGNIFICANT CHANGE UP (ref 80–100)
NRBC # BLD: 0 /100 WBCS — SIGNIFICANT CHANGE UP (ref 0–0)
NRBC # BLD: 0 /100 WBCS — SIGNIFICANT CHANGE UP (ref 0–0)
PHOSPHATE SERPL-MCNC: 7 MG/DL — HIGH (ref 2.5–4.5)
PLATELET # BLD AUTO: 151 K/UL — SIGNIFICANT CHANGE UP (ref 150–400)
PLATELET # BLD AUTO: 167 K/UL — SIGNIFICANT CHANGE UP (ref 150–400)
POTASSIUM SERPL-MCNC: 4.5 MMOL/L — SIGNIFICANT CHANGE UP (ref 3.5–5.3)
POTASSIUM SERPL-SCNC: 4.5 MMOL/L — SIGNIFICANT CHANGE UP (ref 3.5–5.3)
PROT SERPL-MCNC: 8.1 G/DL — SIGNIFICANT CHANGE UP (ref 6–8.3)
RBC # BLD: 4.03 M/UL — LOW (ref 4.2–5.8)
RBC # BLD: 4.12 M/UL — LOW (ref 4.2–5.8)
RBC # FLD: 17.7 % — HIGH (ref 10.3–14.5)
RBC # FLD: 17.9 % — HIGH (ref 10.3–14.5)
SODIUM SERPL-SCNC: 137 MMOL/L — SIGNIFICANT CHANGE UP (ref 135–145)
WBC # BLD: 4.23 K/UL — SIGNIFICANT CHANGE UP (ref 3.8–10.5)
WBC # BLD: 5.03 K/UL — SIGNIFICANT CHANGE UP (ref 3.8–10.5)
WBC # FLD AUTO: 4.23 K/UL — SIGNIFICANT CHANGE UP (ref 3.8–10.5)
WBC # FLD AUTO: 5.03 K/UL — SIGNIFICANT CHANGE UP (ref 3.8–10.5)

## 2020-12-29 PROCEDURE — 99233 SBSQ HOSP IP/OBS HIGH 50: CPT | Mod: GC

## 2020-12-29 PROCEDURE — 99252 IP/OBS CONSLTJ NEW/EST SF 35: CPT

## 2020-12-29 RX ORDER — DEXTROSE 50 % IN WATER 50 %
50 SYRINGE (ML) INTRAVENOUS ONCE
Refills: 0 | Status: COMPLETED | OUTPATIENT
Start: 2020-12-29 | End: 2020-12-29

## 2020-12-29 RX ORDER — HYDRALAZINE HCL 50 MG
50 TABLET ORAL EVERY 8 HOURS
Refills: 0 | Status: DISCONTINUED | OUTPATIENT
Start: 2020-12-29 | End: 2021-01-03

## 2020-12-29 RX ORDER — HYDRALAZINE HCL 50 MG
10 TABLET ORAL ONCE
Refills: 0 | Status: DISCONTINUED | OUTPATIENT
Start: 2020-12-29 | End: 2020-12-29

## 2020-12-29 RX ORDER — HYDRALAZINE HCL 50 MG
5 TABLET ORAL ONCE
Refills: 0 | Status: COMPLETED | OUTPATIENT
Start: 2020-12-29 | End: 2020-12-29

## 2020-12-29 RX ORDER — LABETALOL HCL 100 MG
5 TABLET ORAL ONCE
Refills: 0 | Status: COMPLETED | OUTPATIENT
Start: 2020-12-29 | End: 2020-12-29

## 2020-12-29 RX ORDER — HYDRALAZINE HCL 50 MG
25 TABLET ORAL THREE TIMES A DAY
Refills: 0 | Status: DISCONTINUED | OUTPATIENT
Start: 2020-12-29 | End: 2020-12-29

## 2020-12-29 RX ORDER — PANTOPRAZOLE SODIUM 20 MG/1
40 TABLET, DELAYED RELEASE ORAL DAILY
Refills: 0 | Status: DISCONTINUED | OUTPATIENT
Start: 2020-12-29 | End: 2021-01-03

## 2020-12-29 RX ORDER — INSULIN LISPRO 100/ML
VIAL (ML) SUBCUTANEOUS
Refills: 0 | Status: DISCONTINUED | OUTPATIENT
Start: 2020-12-29 | End: 2021-01-07

## 2020-12-29 RX ADMIN — Medication 5 MILLIGRAM(S): at 19:01

## 2020-12-29 RX ADMIN — Medication 667 MILLIGRAM(S): at 10:18

## 2020-12-29 RX ADMIN — ONDANSETRON 4 MILLIGRAM(S): 8 TABLET, FILM COATED ORAL at 20:01

## 2020-12-29 RX ADMIN — Medication 1 PATCH: at 17:06

## 2020-12-29 RX ADMIN — ONDANSETRON 4 MILLIGRAM(S): 8 TABLET, FILM COATED ORAL at 13:10

## 2020-12-29 RX ADMIN — SIMVASTATIN 40 MILLIGRAM(S): 20 TABLET, FILM COATED ORAL at 22:33

## 2020-12-29 RX ADMIN — Medication 50 MILLIGRAM(S): at 22:33

## 2020-12-29 RX ADMIN — SEVELAMER CARBONATE 1600 MILLIGRAM(S): 2400 POWDER, FOR SUSPENSION ORAL at 10:18

## 2020-12-29 RX ADMIN — HEPARIN SODIUM 5000 UNIT(S): 5000 INJECTION INTRAVENOUS; SUBCUTANEOUS at 13:10

## 2020-12-29 RX ADMIN — Medication 0.1 MILLIGRAM(S): at 10:27

## 2020-12-29 RX ADMIN — Medication 1 PATCH: at 19:07

## 2020-12-29 RX ADMIN — Medication 5 MILLIGRAM(S): at 10:27

## 2020-12-29 RX ADMIN — HEPARIN SODIUM 5000 UNIT(S): 5000 INJECTION INTRAVENOUS; SUBCUTANEOUS at 22:33

## 2020-12-29 RX ADMIN — Medication 5 MILLIGRAM(S): at 13:11

## 2020-12-29 RX ADMIN — Medication 1: at 21:46

## 2020-12-29 RX ADMIN — Medication 5 MILLIGRAM(S): at 17:07

## 2020-12-29 RX ADMIN — ONDANSETRON 4 MILLIGRAM(S): 8 TABLET, FILM COATED ORAL at 07:38

## 2020-12-29 RX ADMIN — Medication 50 MILLILITER(S): at 00:15

## 2020-12-29 RX ADMIN — Medication 5 MILLIGRAM(S): at 22:33

## 2020-12-29 RX ADMIN — NORTRIPTYLINE HYDROCHLORIDE 10 MILLIGRAM(S): 10 CAPSULE ORAL at 12:10

## 2020-12-29 RX ADMIN — Medication 25 MILLIGRAM(S): at 13:10

## 2020-12-29 RX ADMIN — Medication 1 MILLIGRAM(S): at 12:10

## 2020-12-29 NOTE — PROGRESS NOTE ADULT - PROBLEM SELECTOR PLAN 7
patient on insulin at home novolin 70/30 and SSI    A1c is 5.9 - Patient on insulin at home novolin 70/30 and SSI   - A1c is 5.9  - C/w SSI

## 2020-12-29 NOTE — PROGRESS NOTE ADULT - SUBJECTIVE AND OBJECTIVE BOX
[facility] : Progress Note :: [phone], (office [phone]),   Dr Mosher / Dr Washington / Dr Young / Dr Doll / Dr Varsha TURCIOS / Dr Tello / Dr Garcia / Dr Larry qiu  _____________________________________________________________________________________________  remains with nausea and vomitting  Was refusing medications earlier  thus BP elevated    fish (Rash)  liver (Anaphylaxis)  No Known Drug Allergies    Hospital Medications:   MEDICATIONS  (STANDING):  calcium acetate 667 milliGRAM(s) Oral three times a day with meals  cloNIDine 0.1 milliGRAM(s) Oral daily  cloNIDine Patch 0.1 mG/24Hr(s) 1 patch Transdermal once  epoetin beverley-epbx (RETACRIT) Injectable 4000 Unit(s) IV Push <User Schedule>  folic acid 1 milliGRAM(s) Oral daily  heparin   Injectable 5000 Unit(s) SubCutaneous every 8 hours  hydrALAZINE 50 milliGRAM(s) Oral every 8 hours  insulin lispro (ADMELOG) corrective regimen sliding scale   SubCutaneous every 6 hours  labetalol Injectable 5 milliGRAM(s) IV Push once  metoclopramide 5 milliGRAM(s) Oral three times a day  metoprolol tartrate 12.5 milliGRAM(s) Oral two times a day  nortriptyline 10 milliGRAM(s) Oral daily  pantoprazole    Tablet 40 milliGRAM(s) Oral before breakfast  sevelamer carbonate 1600 milliGRAM(s) Oral three times a day with meals  simvastatin 40 milliGRAM(s) Oral at bedtime        VITALS:  T(F): 98 (12-29-20 @ 15:53), Max: 98.2 (12-29-20 @ 07:40)  HR: 119 (12-29-20 @ 15:53)  BP: 195/97 (12-29-20 @ 15:53)  RR: 18 (12-29-20 @ 07:40)  SpO2: 95% (12-29-20 @ 07:40)  Wt(kg): --    12-28 @ 07:01  -  12-29 @ 07:00  --------------------------------------------------------  IN: 600 mL / OUT: 1998 mL / NET: -1398 mL        PHYSICAL EXAM:  Constitutional: NAD  HEENT: anicteric sclera, oropharynx clear.  Neck: No JVD  Respiratory: CTAB, no wheezes, rales or rhonchi  Cardiovascular: S1, S2, RRR  Gastrointestinal: BS+, soft, epigastric tenderness  Extremities: No peripheral edema  Neurological: A/O x 3, no focal deficits  : No CVA tenderness. No [place].   Skin: No rashes  Vascular Access: AVF with thrill and bruit    LABS:  12-29    137  |  95<L>  |  47<H>  ----------------------------<  138<H>  4.5   |  23  |  14.40<H>    Ca    8.1<L>      29 Dec 2020 12:20  Phos  7.0     12-29  Mg     2.5     12-29    TPro  8.1  /  Alb  3.7  /  TBili  0.7  /  DBili      /  AST  4<L>  /  ALT  11  /  AlkPhos  143<H>  12-29    Creatinine Trend: 14.40 <--, 19.90 <--, 18.10 <--, 19.80 <--                        11.8   5.03  )-----------( 167      ( 29 Dec 2020 12:20 )             37.3     Urine Studies:      RADIOLOGY & ADDITIONAL STUDIES:

## 2020-12-29 NOTE — PROGRESS NOTE ADULT - ATTENDING COMMENTS
MEDICAL ATTENDING COVERING DR. De La Torre    Patient seen this morning. Pt  refused answering my questions and exam despite multiple attempts.    60 M  from [facility] with PMH of ESRD on HD TTS, HTN, DM, partially blind and s/p left BKA brought to ED due to vomiting and RLQ abdominal pain found to be in metabolic derangement. has normal wbc with neutrophilia. CT A/P without obvious abnormality. Pt is not producing urine due to ESRD. Noted to be coughing COVID PCR negative. CXR negative. blood cultures testing. S/p incomplete HD ultrafiltration session on 12/28 per nephrology. Metabolic derangements improved however not resolved. Distal esophagitis noted on CT abdomen/pelvis noncontrast. Patient continues to have nausea, vomiting, coffee-ground emesis at bedside. Will obtain GI consultation for possible need for EGD.     A/P  Intractable nausea, vomiting, suspect upper GI bleeding, distal esophagitis noted on CT abdomen  Anion gap metabolic acidosis secondary to ESRD, Fs within normal appears to have tight glycemic control  Hypertensive urgency secondary to missed HD and inability to tolerate PO  ESRD on HD  Hyperphosphatemia  Hyperkalemia - resolved  Anemia secondary to ESRD  T2DM with A1c 5.9  s/p Left BKA    Plan  HD per Nephrology  clonidine was added per Nephrology recommendation, IV hydralazine 5mg given this morning given nausea, vomiting, however will consider other PO options such as hydralazine  CXR negative, blood cultures testing, holding on antibiotics  GI consultation given ongoing nausea, emesis, coffee-ground and CT findings of distal esophagitis, continue to trend CBC at least twice daily in case of upper GI bleeding  Patient on Reglan and Zofran with little relief

## 2020-12-29 NOTE — PROGRESS NOTE ADULT - PROBLEM SELECTOR PLAN 3
potassium of 5.5 and most likely due to ESRD.  Will get HD today  will continue to monitor potassium   nephro Dr. Mosher consulted - Potassium of 5.5 and most likely due to ESRD.  - will continue to monitor potassium   - Nephro Dr. Mosher consulted

## 2020-12-29 NOTE — CONSULT NOTE ADULT - PROBLEM SELECTOR RECOMMENDATION 9
c/w zofran prn  PPI  CT A/P showing thickened distal esophagus suggesting esophagitis c/w zofran prn  PPI  CT A/P showing thickened distal esophagus suggesting esophagitis  if no improvement in symptoms can plan for EGD on Thursday

## 2020-12-29 NOTE — PROGRESS NOTE ADULT - PROBLEM SELECTOR PLAN 9
heparin for DVT prophylaxis - C/w heparin for DVT prophylaxis  IMPROVE VTE Individual Risk Assessment  RISK                                                                Points  [  ] Previous VTE                                                  3  [x ] Thrombophilia                                               2  [  ] Lower limb paralysis                                      2  [  ] Current Cancer                                              2   [  ] Immobilization > 24 hrs                                1  [  ] ICU/CCU stay > 24 hours                              1  [  ] Age > 60                                                      1  IMPROVE VTE Score _____2____

## 2020-12-29 NOTE — PROGRESS NOTE ADULT - PROBLEM SELECTOR PLAN 2
High anion gap metabolic acidosis due to ESRD.  Anion gap of 20  Normal Bicarb and vbg was normal  Serum ketones positive - High anion gap metabolic acidosis due to ESRD.  - Anion gap of 20  - Normal Bicarb and vbg was normal  - Serum ketones positive

## 2020-12-29 NOTE — PROGRESS NOTE ADULT - ASSESSMENT
#ESRD. did not complete HD yesterday but refusing today  # uncontrolled HTN. refusing medications. Suggest clonidine patch and labetalol/hydralazine IV   # renal osteodystrophy: cont phoslo and sevelamer  # anemia of CKD cont epogen on HD   # nausea and vomitting. CT scan noted with distal esophagitis. cont PPI. Gastrologist followup

## 2020-12-29 NOTE — PROGRESS NOTE ADULT - PROBLEM SELECTOR PLAN 5
patient on HD T/T/S  last HD on Thursday and was supposed to have session on Saturday but did not get to go   HD today in the afternoon  nephro Dr. Mosher  continue renvela   monitor BMP daily   SW consult - Patient on HD T/T/S  - Last HD on Thursday and he was supposed to have session on Saturday but did not get to go   - HD12/28 incomplete, refused 12/19  - Nephro Dr. Mosher  - Continue renvela   - Monitor BMP daily   - SW consult

## 2020-12-29 NOTE — CONSULT NOTE ADULT - SUBJECTIVE AND OBJECTIVE BOX
[facility]    Patient is a 60y old  Male who presents with a chief complaint of vomiting (29 Dec 2020 11:58)    HPI:  Patient is a 60 year old male from [place] [address], with PMH of ESRD on HD TTS, HTN, DM, partially blind and s/p left BKA, who presented to the ED due to vomiting. Patient states that he was feeling fine until today when he started to have nausea and multiple episodes of vomiting. Patient unable to state exactly how many episodes he had. Denies any signs of blood as far as he knows. Patient also had some abdominal pain mainly located on RLQ but states it is improved now. Patient states that he has had similar episodes previously and has gone to hospital but unsure exactly about what is the cause of symptoms. Patient states he normally has HD sessions on T/T/S but due to holidays, schedule has been slightly changed and had last session on Friday. Patient states he was supposed to have HD today but did not go due to feeling sick. Currently, patient denies any chest pain, shortness of breath, headache, dizziness or abdominal pain.  (27 Dec 2020 20:37)    PMH/PSH:  PAST MEDICAL & SURGICAL HISTORY:  ESRD on hemodialysis    Vision loss of left eye    Osteoporosis    Osteoarthritis    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    Diabetic neuropathy    Diabetes mellitus    Hyperparathyroidism    Spinal stenosis of lumbosacral region    Peripheral vascular disease    HLD (hyperlipidemia)    Coronary artery disease    Glaucoma    Anemia    CKD (chronic kidney disease)    Adrenal insufficiency    Hypertension    S/P arteriovenous (AV) fistula creation  2018    History of left cataract extraction    History of right cataract extraction    Below knee amputation status, left      FH:  FAMILY HISTORY:      MEDS:  MEDICATIONS  (STANDING):  calcium acetate 667 milliGRAM(s) Oral three times a day with meals  cloNIDine 0.1 milliGRAM(s) Oral daily  epoetin beverley-epbx (RETACRIT) Injectable 4000 Unit(s) IV Push <User Schedule>  folic acid 1 milliGRAM(s) Oral daily  heparin   Injectable 5000 Unit(s) SubCutaneous every 8 hours  hydrALAZINE 25 milliGRAM(s) Oral three times a day  insulin lispro (ADMELOG) corrective regimen sliding scale   SubCutaneous every 6 hours  metoclopramide 5 milliGRAM(s) Oral three times a day  metoprolol tartrate 12.5 milliGRAM(s) Oral two times a day  nortriptyline 10 milliGRAM(s) Oral daily  pantoprazole    Tablet 40 milliGRAM(s) Oral before breakfast  sevelamer carbonate 1600 milliGRAM(s) Oral three times a day with meals  simvastatin 40 milliGRAM(s) Oral at bedtime    MEDICATIONS  (PRN):  acetaminophen   Tablet .. 650 milliGRAM(s) Oral every 6 hours PRN Temp greater or equal to 38C (100.4F), Moderate Pain (4 - 6)  guaiFENesin   Syrup  (Sugar-Free) 100 milliGRAM(s) Oral every 6 hours PRN Cough  ondansetron Injectable 4 milliGRAM(s) IV Push every 6 hours PRN Nausea and/or Vomiting  oxycodone    5 mG/acetaminophen 325 mG 1 Tablet(s) Oral every 6 hours PRN Severe Pain (7 - 10)    Allergies    fish (Rash)  liver (Anaphylaxis)  No Known Drug Allergies    Intolerances            CONSTITUTIONAL:  No weight loss, fever, chills, weakness or fatigue.  HEENT:  Eyes:  No visual loss, blurred vision, double vision or yellow sclerae. Ears, Nose, Throat:  No hearing loss, sneezing, congestion, runny nose or sore throat.  SKIN:  No rash or itching.  CARDIOVASCULAR:  No chest pain, chest pressure or chest discomfort. No palpitations or edema.  RESPIRATORY:  No shortness of breath, cough or sputum.  GASTROINTESTINAL:  SEE HPI  GENITOURINARY:  No dysuria, hematuria, urinary frequency  NEUROLOGICAL:  No headache, dizziness, syncope, paralysis, ataxia, numbness or tingling in the extremities. No change in bowel or bladder control.  MUSCULOSKELETAL:  No muscle, back pain, joint pain or stiffness.  HEMATOLOGIC:  No anemia, bleeding or bruising.  LYMPHATICS:  No enlarged nodes. No history of splenectomy.  PSYCHIATRIC:  No history of depression or anxiety.  ENDOCRINOLOGIC:  No reports of sweating, cold or heat intolerance. No polyuria or polydipsia.      ______________________________________________________________________  PHYSICAL EXAM:  T(C): 36.8 (12-29-20 @ 07:40), Max: 37.3 (12-28-20 @ 16:18)  HR: 115 (12-29-20 @ 11:23)  BP: 189/95 (12-29-20 @ 11:23)  RR: 18 (12-29-20 @ 07:40)  SpO2: 95% (12-29-20 @ 07:40)  Wt(kg): --    12-28 - 12-29  --------------------------------------------------------  IN:    Other (mL): 600 mL  Total IN: 600 mL    OUT:    Other (mL): 1998 mL  Total OUT: 1998 mL    Total NET: -1398 mL          GEN: NAD, normocephalic  CVS: S1S2+  CHEST: clear to auscultation  ABD: soft , nontender, nondistended, bowel sounds present  EXTR: no cyanosis, no clubbing, no edema  NEURO: Awake and alert; oriented .....  SKIN:  warm;  non icteric    ______________________________________________________________________  LABS:                        11.8   5.03  )-----------( 167      ( 29 Dec 2020 12:20 )             37.3     12-29    137  |  95<L>  |  47<H>  ----------------------------<  138<H>  4.5   |  23  |  14.40<H>    Ca    8.1<L>      29 Dec 2020 12:20  Phos  7.0     12-29  Mg     2.5     12-29    TPro  8.1  /  Alb  3.7  /  TBili  0.7  /  DBili  x   /  AST  4<L>  /  ALT  11  /  AlkPhos  143<H>  12-29    LIVER FUNCTIONS - ( 29 Dec 2020 12:20 )  Alb: 3.7 g/dL / Pro: 8.1 g/dL / ALK PHOS: 143 U/L / ALT: 11 U/L DA / AST: 4 U/L / GGT: x           PT/INR - ( 28 Dec 2020 06:54 )   PT: 11.1 sec;   INR: 0.93 ratio         PTT - ( 28 Dec 2020 06:54 )  PTT:31.0 sec  ____________________________________________          
[facility] : Progress Note :: [phone], (office [phone]),   Dr Mosher / Dr Washington / Dr Young / Dr Doll / Dr Varsha TURCIOS / Dr Tello / Dr Garcia / Dr Larry qiu  _____________________________________________________________________________________________    60 yr old male with H/O ESRD on HD at [facility]. Presented to ED C/O nausea and vomitting. Associated with LRQ pain.   In ED noted to have hyperkalemia and uncontrolled HTN  S/P medical management and currently seen on HD with no complains  nausea and vomitting has improved  renal Consulted for ESRD          PAST MEDICAL & SURGICAL HISTORY:  ESRD on hemodialysis    Vision loss of left eye    Osteoporosis    Osteoarthritis    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    Diabetic neuropathy    Diabetes mellitus    Hyperparathyroidism    Spinal stenosis of lumbosacral region    Peripheral vascular disease    HLD (hyperlipidemia)    Coronary artery disease    Glaucoma    Anemia    CKD (chronic kidney disease)    Adrenal insufficiency    Hypertension    S/P arteriovenous (AV) fistula creation  2018    History of left cataract extraction    History of right cataract extraction    Below knee amputation status, left      fish (Rash)  liver (Anaphylaxis)  No Known Drug Allergies    Home Medications Reviewed  Hospital Medications:   MEDICATIONS  (STANDING):  calcium acetate 667 milliGRAM(s) Oral three times a day with meals  cloNIDine 0.1 milliGRAM(s) Oral daily  epoetin beverley-epbx (RETACRIT) Injectable 4000 Unit(s) IV Push <User Schedule>  folic acid 1 milliGRAM(s) Oral daily  heparin   Injectable 5000 Unit(s) SubCutaneous every 8 hours  insulin lispro (ADMELOG) corrective regimen sliding scale   SubCutaneous every 6 hours  metoclopramide 5 milliGRAM(s) Oral three times a day  metoprolol tartrate 12.5 milliGRAM(s) Oral two times a day  nortriptyline 10 milliGRAM(s) Oral daily  pantoprazole    Tablet 40 milliGRAM(s) Oral before breakfast  sevelamer carbonate 1600 milliGRAM(s) Oral three times a day with meals  simvastatin 40 milliGRAM(s) Oral at bedtime    SOCIAL HISTORY:  Denies ETOh,Smoking,   FAMILY HISTORY:        VITALS:  T(F): 98.7 (12-28-20 @ 16:20), Max: 99.1 (12-28-20 @ 16:18)  HR: 94 (12-28-20 @ 16:20)  BP: 163/89 (12-28-20 @ 16:20)  RR: 18 (12-28-20 @ 16:20)  SpO2: 96% (12-28-20 @ 16:20)  Wt(kg): --      PHYSICAL EXAM:  Constitutional: NAD  HEENT: anicteric sclera, oropharynx clear.  Neck: No JVD  Respiratory: CTAB, no wheezes, rales or rhonchi  Cardiovascular: S1, S2, RRR  Gastrointestinal: BS+, soft, NT/ND  Extremities: No peripheral edema  Neurological: A/O x 3, no focal deficits  Vascular Access: AVF cannulated    LABS:  12-28    139  |  97  |  91<H>  ----------------------------<  84  5.5<H>   |  22  |  19.90<H>    Ca    8.0<L>      28 Dec 2020 06:54  Phos  6.6     12-28  Mg     2.8     12-28    TPro  6.7  /  Alb  3.2<L>  /  TBili  0.4  /  DBili      /  AST  4<L>  /  ALT  10  /  AlkPhos  120  12-27    Creatinine Trend: 19.90 <--, 18.10 <--, 19.80 <--                        10.8   6.16  )-----------( 185      ( 28 Dec 2020 06:54 )             34.5     Urine Studies:      RADIOLOGY & ADDITIONAL STUDIES:

## 2020-12-29 NOTE — CHART NOTE - NSCHARTNOTEFT_GEN_A_CORE
RN paged me ragarding the patient blood pressure 179/88 s/p Labetalol 5mg IV. Will give another 5mg IV and will recheck BP. Primary team to follow up.

## 2020-12-29 NOTE — CONSULT NOTE ADULT - ASSESSMENT
#ESRD. seen on HD stable  # uncontrolled HTN. Improved with ultrafiltration. cont current BP regimen  # renal osteodystrophy: cont phoslo and sevelamer  # anemia of CKD cont epogen on HD   # nausea and vomitting. Improving- per primary team    Thanks for the consult.
GI asked to evaluate this 60 year old male from [place] [address], with PMH of ESRD on HD TTS, HTN, DM, partially blind and s/p left BKA, who presented to the ED due to vomiting. Patient seen and examined at [facility] continues with nausea and vomiting. Denies abd pain. No diarrhea. Doesn't recall last BM

## 2020-12-29 NOTE — PROGRESS NOTE ADULT - PROBLEM SELECTOR PLAN 4
Phosphorus is 6.6  started on phoslo  Will monitor Phosphorus. - Phosphorus is 6.6  - C/w phoslo and monitor

## 2020-12-29 NOTE — PROGRESS NOTE ADULT - ASSESSMENT
Patient is a 60 year old male from [place] [address], with PMH of ESRD on HD TTS, HTN, DM, partially blind and s/p left BKA, who presented to the ED due to vomiting.     Hgb of 12.6. Potassium of 7.3 with repeat of 5.9. Creatinine of 19.80. COVID negative. CT abdomen showing thickened distal esophagus. Mild diffuse bladder wall thickening. Given NS bolus, LR bolus, lokelma, reglan and zofran in ED.

## 2020-12-29 NOTE — PROGRESS NOTE ADULT - SUBJECTIVE AND OBJECTIVE BOX
PGY-1 Progress Note discussed with attending    PAGER #: [[phone]] TILL 5:00 PM  PLEASE CONTACT ON CALL TEAM:  - On Call Team (Please refer to Dejon) FROM 5:00 PM - 8:30PM  - Nightfloat Team FROM 8:30 -7:30 AM    CHIEF COMPLAINT & BRIEF HOSPITAL COURSE:    INTERVAL HPI/OVERNIGHT EVENTS:       REVIEW OF SYSTEMS:  CONSTITUTIONAL: No fever, weight loss, or fatigue  RESPIRATORY: No cough, wheezing, chills or hemoptysis; No shortness of breath  CARDIOVASCULAR: No chest pain, palpitations, dizziness, or leg swelling  GASTROINTESTINAL: No abdominal pain. No nausea, vomiting, or hematemesis; No diarrhea or constipation. No melena or hematochezia.  GENITOURINARY: No dysuria or hematuria, urinary frequency  NEUROLOGICAL: No headaches, memory loss, loss of strength, numbness, or tremors  SKIN: No itching, burning, rashes, or lesions     MEDICATIONS  (STANDING):  calcium acetate 667 milliGRAM(s) Oral three times a day with meals  cloNIDine 0.1 milliGRAM(s) Oral daily  epoetin beverley-epbx (RETACRIT) Injectable 4000 Unit(s) IV Push <User Schedule>  folic acid 1 milliGRAM(s) Oral daily  heparin   Injectable 5000 Unit(s) SubCutaneous every 8 hours  hydrALAZINE 25 milliGRAM(s) Oral three times a day  insulin lispro (ADMELOG) corrective regimen sliding scale   SubCutaneous every 6 hours  metoclopramide 5 milliGRAM(s) Oral three times a day  metoprolol tartrate 12.5 milliGRAM(s) Oral two times a day  nortriptyline 10 milliGRAM(s) Oral daily  pantoprazole    Tablet 40 milliGRAM(s) Oral before breakfast  sevelamer carbonate 1600 milliGRAM(s) Oral three times a day with meals  simvastatin 40 milliGRAM(s) Oral at bedtime    MEDICATIONS  (PRN):  acetaminophen   Tablet .. 650 milliGRAM(s) Oral every 6 hours PRN Temp greater or equal to 38C (100.4F), Moderate Pain (4 - 6)  guaiFENesin   Syrup  (Sugar-Free) 100 milliGRAM(s) Oral every 6 hours PRN Cough  ondansetron Injectable 4 milliGRAM(s) IV Push every 6 hours PRN Nausea and/or Vomiting  oxycodone    5 mG/acetaminophen 325 mG 1 Tablet(s) Oral every 6 hours PRN Severe Pain (7 - 10)      Vital Signs Last 24 Hrs  T(C): 36.8 (29 Dec 2020 07:40), Max: 37.3 (28 Dec 2020 16:18)  T(F): 98.2 (29 Dec 2020 07:40), Max: 99.1 (28 Dec 2020 16:18)  HR: 115 (29 Dec 2020 11:23) (89 - 115)  BP: 189/95 (29 Dec 2020 11:23) (134/85 - 206/99)  BP(mean): --  RR: 18 (29 Dec 2020 07:40) (17 - 18)  SpO2: 95% (29 Dec 2020 07:40) (95% - 98%)    PHYSICAL EXAMINATION:  GENERAL: NAD, well built  HEAD:  Atraumatic, Normocephalic  EYES:  conjunctiva and sclera clear  NECK: Supple, No JVD, Normal thyroid  CHEST/LUNG: Clear to auscultation. Clear to percussion bilaterally; No rales, rhonchi, wheezing, or rubs  HEART: Regular rate and rhythm; No murmurs, rubs, or gallops  ABDOMEN: Soft, Nontender, Nondistended; Bowel sounds present  NERVOUS SYSTEM:  Alert & Oriented X3,    EXTREMITIES:  2+ Peripheral Pulses, No clubbing, cyanosis, or edema  SKIN: warm dry                          10.8   6.16  )-----------( 185      ( 28 Dec 2020 06:54 )             34.5     12-28    139  |  97  |  91<H>  ----------------------------<  84  5.5<H>   |  22  |  19.90<H>    Ca    8.0<L>      28 Dec 2020 06:54  Phos  6.6     12-28  Mg     2.8     12-28    TPro  6.7  /  Alb  3.2<L>  /  TBili  0.4  /  DBili  x   /  AST  4<L>  /  ALT  10  /  AlkPhos  120  12-27    LIVER FUNCTIONS - ( 27 Dec 2020 21:21 )  Alb: 3.2 g/dL / Pro: 6.7 g/dL / ALK PHOS: 120 U/L / ALT: 10 U/L DA / AST: 4 U/L / GGT: x               PT/INR - ( 28 Dec 2020 06:54 )   PT: 11.1 sec;   INR: 0.93 ratio         PTT - ( 28 Dec 2020 06:54 )  PTT:31.0 sec    CAPILLARY BLOOD GLUCOSE      RADIOLOGY & ADDITIONAL TESTS:                   PGY-1 Progress Note discussed with attending    PAGER #: [[phone]] TILL 5:00 PM  PLEASE CONTACT ON CALL TEAM:  - On Call Team (Please refer to Dejon) FROM 5:00 PM - 8:30PM  - Nightfloat Team FROM 8:30 -7:30 AM    CHIEF COMPLAINT & BRIEF HOSPITAL COURSE:  Patient is a 60M from [place] [address], with PMH of ESRD on HD TTS, HTN, DM, partially blind and s/p left BKA, who presented to the ED due to vomiting. Patient states that he was feeling fine until today when he started to have nausea and multiple episodes of vomiting. Patient unable to state exactly how many episodes he had. Denies any signs of blood as far as he knows. Patient also had some abdominal pain mainly located on RLQ but states it is improved now. Patient states that he has had similar episodes previously and has gone to hospital but unsure exactly about what is the cause of symptoms. Patient states he normally has HD sessions on T/T/S but it was changed due to the holidays, and he missed another session due to feeling sick. Currently, patient denies any chest pain, shortness of breath, headache, dizziness or abdominal pain. He was admitted for management of intractable vomiting which is likely 2/2 to diabetic gastroparesis vs electrolyte derangements. He was mildly improved on reglan, and was not able to tolerate the entire session of dialysis 12/28. After this, he refused AM meds and blood draws, and his BP remained elevated due to rebound HTN. Clonidine PO was made patch and labetalol 5mg push given with hydralazine PRN pushes.     INTERVAL HPI/OVERNIGHT EVENTS:   Patient was examined while he was lying in bed, Aox3, responding appropriately to questions, in NAD. He has normal bowel and bladder movements, and endorses retching, continued vomiting. He is able to tolerate more food Po, and is advanced to clears today. Pt refused AM labs, blood draws, and repeat dialysis (As per Dr. Mosher). Awaiting GI consult.     REVIEW OF SYSTEMS:  CONSTITUTIONAL: Fatigue + No fever, weight loss  RESPIRATORY: No cough, wheezing, chills or hemoptysis; No shortness of breath  CARDIOVASCULAR: No chest pain, palpitations, dizziness, or leg swelling  GASTROINTESTINAL: RUQ Abdominal pain +, Nausea, vomiting, retching+  No hematemesis; No diarrhea or constipation. No melena or hematochezia.  GENITOURINARY: No dysuria or hematuria, urinary frequency  NEUROLOGICAL: No headaches, memory loss, loss of strength, numbness, or tremors  SKIN: No itching, burning, rashes, or lesions     MEDICATIONS  (STANDING):  calcium acetate 667 milliGRAM(s) Oral three times a day with meals  cloNIDine 0.1 milliGRAM(s) Oral daily  epoetin beverley-epbx (RETACRIT) Injectable 4000 Unit(s) IV Push <User Schedule>  folic acid 1 milliGRAM(s) Oral daily  heparin   Injectable 5000 Unit(s) SubCutaneous every 8 hours  hydrALAZINE 25 milliGRAM(s) Oral three times a day  insulin lispro (ADMELOG) corrective regimen sliding scale   SubCutaneous every 6 hours  metoclopramide 5 milliGRAM(s) Oral three times a day  metoprolol tartrate 12.5 milliGRAM(s) Oral two times a day  nortriptyline 10 milliGRAM(s) Oral daily  pantoprazole    Tablet 40 milliGRAM(s) Oral before breakfast  sevelamer carbonate 1600 milliGRAM(s) Oral three times a day with meals  simvastatin 40 milliGRAM(s) Oral at bedtime    MEDICATIONS  (PRN):  acetaminophen   Tablet .. 650 milliGRAM(s) Oral every 6 hours PRN Temp greater or equal to 38C (100.4F), Moderate Pain (4 - 6)  guaiFENesin   Syrup  (Sugar-Free) 100 milliGRAM(s) Oral every 6 hours PRN Cough  ondansetron Injectable 4 milliGRAM(s) IV Push every 6 hours PRN Nausea and/or Vomiting  oxycodone    5 mG/acetaminophen 325 mG 1 Tablet(s) Oral every 6 hours PRN Severe Pain (7 - 10)      Vital Signs Last 24 Hrs  T(C): 36.8 (29 Dec 2020 07:40), Max: 37.3 (28 Dec 2020 16:18)  T(F): 98.2 (29 Dec 2020 07:40), Max: 99.1 (28 Dec 2020 16:18)  HR: 115 (29 Dec 2020 11:23) (89 - 115)  BP: 189/95 (29 Dec 2020 11:23) (134/85 - 206/99)  BP(mean): --  RR: 18 (29 Dec 2020 07:40) (17 - 18)  SpO2: 95% (29 Dec 2020 07:40) (95% - 98%)    PHYSICAL EXAMINATION:  GENERAL: NAD, well built AAM, vomiting lying in bed   HEAD:  Atraumatic, Normocephalic  EYES:  conjunctiva and sclera clear  NECK: Supple, No JVD, Normal thyroid  CHEST/LUNG: Clear to auscultation. Clear to percussion bilaterally; No rales, rhonchi, wheezing, or rubs  HEART: Regular rate and rhythm; No murmurs, rubs, or gallops  ABDOMEN: Soft, Nontender, Nondistended; Bowel sounds increased   NERVOUS SYSTEM:  Alert & Oriented X3, No motor or sensory deficits   EXTREMITIES: R AVF in place 2+ Peripheral Pulses, No clubbing, cyanosis, or edema  SKIN: warm dry                          10.8   6.16  )-----------( 185      ( 28 Dec 2020 06:54 )             34.5     12-28    139  |  97  |  91<H>  ----------------------------<  84  5.5<H>   |  22  |  19.90<H>    Ca    8.0<L>      28 Dec 2020 06:54  Phos  6.6     12-28  Mg     2.8     12-28    TPro  6.7  /  Alb  3.2<L>  /  TBili  0.4  /  DBili  x   /  AST  4<L>  /  ALT  10  /  AlkPhos  120  12-27    LIVER FUNCTIONS - ( 27 Dec 2020 21:21 )  Alb: 3.2 g/dL / Pro: 6.7 g/dL / ALK PHOS: 120 U/L / ALT: 10 U/L DA / AST: 4 U/L / GGT: x               PT/INR - ( 28 Dec 2020 06:54 )   PT: 11.1 sec;   INR: 0.93 ratio         PTT - ( 28 Dec 2020 06:54 )  PTT:31.0 sec    CAPILLARY BLOOD GLUCOSE      RADIOLOGY & ADDITIONAL TESTS:

## 2020-12-29 NOTE — PROGRESS NOTE ADULT - PROBLEM SELECTOR PLAN 8
continue home med of simvastatin   lipid profile wnl - continue home med of simvastatin  - lipid profile wnl

## 2020-12-29 NOTE — PROGRESS NOTE ADULT - PROBLEM SELECTOR PLAN 1
Pt BP was 201/99  s/p metoprolol 12.5mg q12 and resumed his home medication.  started on Clonidine  Will monitor BP - Pt BP was 201/99  - S/p metoprolol 12.5mg q12 and resumed his home medication.  - Started on Clonidine PO which pt is not tolerating due to vomiting. Switched to clonidine patch  - Will continue on  hydralazine IV pushes PRN  - Refused dialysis 12/29    - Will monitor BP

## 2020-12-29 NOTE — PROGRESS NOTE ADULT - PROBLEM SELECTOR PLAN 6
patient with Hgb of 10.8   likely secondary to ESRD  On Epogen  monitor CBC daily  continue home med of folic acid - Patient with Hgb of 10.8   - Likely secondary to ESRD  - On Epogen  - Monitor CBC daily  - Continue home med of folic acid

## 2020-12-30 LAB
ANION GAP SERPL CALC-SCNC: 21 MMOL/L — HIGH (ref 5–17)
BUN SERPL-MCNC: 67 MG/DL — HIGH (ref 7–18)
CALCIUM SERPL-MCNC: 7.6 MG/DL — LOW (ref 8.4–10.5)
CHLORIDE SERPL-SCNC: 92 MMOL/L — LOW (ref 96–108)
CO2 SERPL-SCNC: 25 MMOL/L — SIGNIFICANT CHANGE UP (ref 22–31)
CREAT SERPL-MCNC: 19.1 MG/DL — HIGH (ref 0.5–1.3)
GLUCOSE BLDC GLUCOMTR-MCNC: 154 MG/DL — HIGH (ref 70–99)
GLUCOSE BLDC GLUCOMTR-MCNC: 173 MG/DL — HIGH (ref 70–99)
GLUCOSE BLDC GLUCOMTR-MCNC: 205 MG/DL — HIGH (ref 70–99)
GLUCOSE BLDC GLUCOMTR-MCNC: 218 MG/DL — HIGH (ref 70–99)
GLUCOSE SERPL-MCNC: 189 MG/DL — HIGH (ref 70–99)
HCT VFR BLD CALC: 37.4 % — LOW (ref 39–50)
HGB BLD-MCNC: 11.7 G/DL — LOW (ref 13–17)
MCHC RBC-ENTMCNC: 28.3 PG — SIGNIFICANT CHANGE UP (ref 27–34)
MCHC RBC-ENTMCNC: 31.3 GM/DL — LOW (ref 32–36)
MCV RBC AUTO: 90.3 FL — SIGNIFICANT CHANGE UP (ref 80–100)
NRBC # BLD: 0 /100 WBCS — SIGNIFICANT CHANGE UP (ref 0–0)
PLATELET # BLD AUTO: 157 K/UL — SIGNIFICANT CHANGE UP (ref 150–400)
POTASSIUM SERPL-MCNC: 4.4 MMOL/L — SIGNIFICANT CHANGE UP (ref 3.5–5.3)
POTASSIUM SERPL-SCNC: 4.4 MMOL/L — SIGNIFICANT CHANGE UP (ref 3.5–5.3)
RBC # BLD: 4.14 M/UL — LOW (ref 4.2–5.8)
RBC # FLD: 18.4 % — HIGH (ref 10.3–14.5)
SODIUM SERPL-SCNC: 138 MMOL/L — SIGNIFICANT CHANGE UP (ref 135–145)
WBC # BLD: 7.64 K/UL — SIGNIFICANT CHANGE UP (ref 3.8–10.5)
WBC # FLD AUTO: 7.64 K/UL — SIGNIFICANT CHANGE UP (ref 3.8–10.5)

## 2020-12-30 PROCEDURE — 99232 SBSQ HOSP IP/OBS MODERATE 35: CPT

## 2020-12-30 PROCEDURE — 99232 SBSQ HOSP IP/OBS MODERATE 35: CPT | Mod: GC

## 2020-12-30 RX ORDER — HYDRALAZINE HCL 50 MG
5 TABLET ORAL EVERY 8 HOURS
Refills: 0 | Status: DISCONTINUED | OUTPATIENT
Start: 2020-12-30 | End: 2021-01-07

## 2020-12-30 RX ORDER — ONDANSETRON 8 MG/1
4 TABLET, FILM COATED ORAL ONCE
Refills: 0 | Status: COMPLETED | OUTPATIENT
Start: 2020-12-30 | End: 2020-12-30

## 2020-12-30 RX ADMIN — Medication 1: at 21:35

## 2020-12-30 RX ADMIN — Medication 50 MILLIGRAM(S): at 05:58

## 2020-12-30 RX ADMIN — Medication 12.5 MILLIGRAM(S): at 05:58

## 2020-12-30 RX ADMIN — Medication 1 PATCH: at 22:11

## 2020-12-30 RX ADMIN — ONDANSETRON 4 MILLIGRAM(S): 8 TABLET, FILM COATED ORAL at 12:06

## 2020-12-30 RX ADMIN — HEPARIN SODIUM 5000 UNIT(S): 5000 INJECTION INTRAVENOUS; SUBCUTANEOUS at 05:52

## 2020-12-30 RX ADMIN — Medication 5 MILLIGRAM(S): at 05:58

## 2020-12-30 RX ADMIN — ONDANSETRON 4 MILLIGRAM(S): 8 TABLET, FILM COATED ORAL at 02:10

## 2020-12-30 RX ADMIN — Medication 0.1 MILLIGRAM(S): at 05:58

## 2020-12-30 RX ADMIN — Medication 1 PATCH: at 07:46

## 2020-12-30 RX ADMIN — HEPARIN SODIUM 5000 UNIT(S): 5000 INJECTION INTRAVENOUS; SUBCUTANEOUS at 14:40

## 2020-12-30 NOTE — PROGRESS NOTE ADULT - PROBLEM SELECTOR PLAN 3
- Potassium of 5.5 and most likely due to ESRD.  - will continue to monitor potassium   - Nephro Dr. Mosher consulted - UNABLE TO FOLLOW LABS, - pt refuses daily labs   - Potassium of 5.5 and most likely due to ESRD.  - will continue to monitor potassium   - Nephro Dr. Mosher consulted

## 2020-12-30 NOTE — PROGRESS NOTE ADULT - PROBLEM SELECTOR PLAN 1
- Pt BP was 201/99  - S/p metoprolol 12.5mg q12 and resumed his home medication.  - Started on Clonidine PO which pt is not tolerating due to vomiting. Switched to clonidine patch  - Will continue on  hydralazine IV pushes PRN  - Refused dialysis 12/29    - Will monitor BP - Pt BP was 201/99  - S/p metoprolol 12.5mg q12 and resumed his home medication.  - Started on Clonidine PO which pt is not tolerating due to vomiting. Switched to clonidine patch  - Will continue on  hydralazine IV pushes PRN  - Refused dialysis 12/29 , 12/30  - Will monitor BP

## 2020-12-30 NOTE — PROGRESS NOTE ADULT - ASSESSMENT
#ESRD. Refusing  HD  today. cont to encourage compliance with medical care.  HD in AM if he does not change his mind today  # uncontrolled HTN. Cont clonidine hydralazinw and metoprolol  # renal osteodystrophy: cont phoslo and sevelamer. phos restricted diet  # anemia of CKD cont epogen on HD   # nausea and vomitting. CT scan noted with distal esophagitis. Awaits endoscopy

## 2020-12-30 NOTE — PROGRESS NOTE ADULT - PROBLEM SELECTOR PLAN 2
- High anion gap metabolic acidosis due to ESRD.  - Anion gap of 20  - Normal Bicarb and vbg was normal  - Serum ketones positive

## 2020-12-30 NOTE — PROGRESS NOTE ADULT - ATTENDING COMMENTS
MEDICAL ATTENDING COVERING DR. De La Torre    Patient seen this morning, actively vomiting, refusing IV replacement and blood draws, stating he wants to take a shower. Patient understands he will not be properly medication for nausea/vomiting but insists on delaying IV line placement. Later in the evening, patient allowed IV line placement, however still refusing dialysis. pending EGD tomorrow given ongoing nausea/vomiting and CT findings of distal esophagitis; In brief, patient is a 60 M  from [facility] with PMH of ESRD on HD TTS, HTN, DM, partially blind and s/p left BKA brought to ED due to vomiting and RLQ abdominal pain found to be in metabolic derangement. has normal wbc with neutrophilia. CT A/P without obvious abnormality. Pt is not producing urine due to ESRD. Noted to be coughing COVID PCR negative. CXR negative. blood cultures testing. S/p incomplete HD ultrafiltration session on 12/28 per nephrology. Metabolic derangements improved however not resolved. Distal esophagitis noted on CT abdomen/pelvis noncontrast. Patient continues to have nausea, vomiting, coffee-ground emesis at bedside. NPO after midnight for EGD planned 12/31.    A/P  Intractable nausea, vomiting, suspect upper GI bleeding, distal esophagitis noted on CT abdomen  Anion gap metabolic acidosis secondary to ESRD, Fs within normal appears to have tight glycemic control  Hypertensive urgency secondary to missed HD and inability to tolerate PO medications, IV BP meds ordered  ESRD on HD  Hyperphosphatemia  Hyperkalemia - resolved  Anemia secondary to ESRD  T2DM with A1c 5.9  s/p Left BKA    Plan  HD per Nephrology - patient currently refusing  clonidine was added per Nephrology recommendation, IV hydralazine 5mg PRN  CXR negative, blood cultures testing, holding on antibiotics  GI consultation given ongoing nausea, emesis, coffee-ground and CT findings of distal esophagitis, continue to trend CBC at least daily in case of upper GI bleeding, twice daily if patient allows; PPI daily per GI  Patient on Reglan and Zofran with little relief    Plan for EGD tomorrow 12/31. NPO after midnight.

## 2020-12-30 NOTE — PROGRESS NOTE ADULT - SUBJECTIVE AND OBJECTIVE BOX
GI PROGRESS NOTE    Patient is a 60y old  Male who presents with a chief complaint of vomiting (30 Dec 2020 10:40)      HPI:  Patient is a 60 year old male from [place] [address], with PMH of ESRD on HD TTS, HTN, DM, partially blind and s/p left BKA, who presented to the ED due to vomiting. Patient states that he was feeling fine until today when he started to have nausea and multiple episodes of vomiting. Patient unable to state exactly how many episodes he had. Denies any signs of blood as far as he knows. Patient also had some abdominal pain mainly located on RLQ but states it is improved now. Patient states that he has had similar episodes previously and has gone to hospital but unsure exactly about what is the cause of symptoms. Patient states he normally has HD sessions on T/T/S but due to holidays, schedule has been slightly changed and had last session on Friday. Patient states he was supposed to have HD today but did not go due to feeling sick. Currently, patient denies any chest pain, shortness of breath, headache, dizziness or abdominal pain.  (27 Dec 2020 20:37)          ______________________________________________________________________  PMH/PSH:  PAST MEDICAL & SURGICAL HISTORY:  ESRD on hemodialysis    Vision loss of left eye    Osteoporosis    Osteoarthritis    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    Diabetic neuropathy    Diabetes mellitus    Hyperparathyroidism    Spinal stenosis of lumbosacral region    Peripheral vascular disease    HLD (hyperlipidemia)    Coronary artery disease    Glaucoma    Anemia    CKD (chronic kidney disease)    Adrenal insufficiency    Hypertension    S/P arteriovenous (AV) fistula creation  2018    History of left cataract extraction    History of right cataract extraction    Below knee amputation status, left      ______________________________________________________________________  MEDS:  MEDICATIONS  (STANDING):  calcium acetate 667 milliGRAM(s) Oral three times a day with meals  cloNIDine 0.1 milliGRAM(s) Oral daily  epoetin beverley-epbx (RETACRIT) Injectable 4000 Unit(s) IV Push <User Schedule>  folic acid 1 milliGRAM(s) Oral daily  heparin   Injectable 5000 Unit(s) SubCutaneous every 8 hours  hydrALAZINE 50 milliGRAM(s) Oral every 8 hours  insulin lispro (ADMELOG) corrective regimen sliding scale   SubCutaneous Before meals and at bedtime  metoclopramide 5 milliGRAM(s) Oral three times a day  metoprolol tartrate 12.5 milliGRAM(s) Oral two times a day  nortriptyline 10 milliGRAM(s) Oral daily  pantoprazole  Injectable 40 milliGRAM(s) IV Push daily  sevelamer carbonate 1600 milliGRAM(s) Oral three times a day with meals  simvastatin 40 milliGRAM(s) Oral at bedtime    MEDICATIONS  (PRN):  acetaminophen   Tablet .. 650 milliGRAM(s) Oral every 6 hours PRN Temp greater or equal to 38C (100.4F), Moderate Pain (4 - 6)  guaiFENesin   Syrup  (Sugar-Free) 100 milliGRAM(s) Oral every 6 hours PRN Cough  ondansetron Injectable 4 milliGRAM(s) IV Push every 6 hours PRN Nausea and/or Vomiting  oxycodone    5 mG/acetaminophen 325 mG 1 Tablet(s) Oral every 6 hours PRN Severe Pain (7 - 10)    ______________________________________________________________________  ALL:   Allergies    fish (Rash)  liver (Anaphylaxis)  No Known Drug Allergies    Intolerances      ______________________________________________________________________  SH: Social History:  Patient is from [facility]. Denies any smoking, alcohol use or use of illicit drugs. (27 Dec 2020 20:37)    ______________________________________________________________________  FH:  FAMILY HISTORY:    ______________________________________________________________________  ROS:    CONSTITUTIONAL:  No weight loss, fever, chills, weakness or fatigue.    HEENT:  Eyes:  No visual loss, blurred vision, double vision or yellow sclerae. Ears, Nose, Throat:  No hearing loss, sneezing, congestion, runny nose or sore throat.    SKIN:  No rash or itching.    CARDIOVASCULAR:  No chest pain, chest pressure or chest discomfort. No palpitations or edema.    RESPIRATORY:  No shortness of breath, cough or sputum.    GASTROINTESTINAL:  SEE HPI    GENITOURINARY:  No dysuria, hematuria, urinary frequency    NEUROLOGICAL:  No headache, dizziness, syncope, paralysis, ataxia, numbness or tingling in the extremities. No change in bowel or bladder control.    MUSCULOSKELETAL:  No muscle, back pain, joint pain or stiffness.    HEMATOLOGIC:  No anemia, bleeding or bruising.    LYMPHATICS:  No enlarged nodes. No history of splenectomy.    PSYCHIATRIC:  No history of depression or anxiety.    ENDOCRINOLOGIC:  No reports of sweating, cold or heat intolerance. No polyuria or polydipsia.    ALLERGIES:  No history of asthma, hives, eczema or rhinitis.  ______________________________________________________________________  PHYSICAL EXAM:  T(C): 36.3 (12-30-20 @ 08:41), Max: 37 (12-29-20 @ 23:55)  HR: 105 (12-30-20 @ 08:41)  BP: 185/94 (12-30-20 @ 08:41)  RR: 17 (12-30-20 @ 08:41)  SpO2: 96% (12-30-20 @ 08:41)  Wt(kg): --      GEN: NAD   CVS- S1 S2  ABD: soft nontender, non distended, bowel sounds+  LUNGS: clear to auscultation  NEURO: noin focal neuro exam; AAO x 3  Extremities: no cyanosis, no calf tenderness, no edema, no clubbing      ______________________________________________________________________  LABS:                        11.4   4.23  )-----------( 151      ( 29 Dec 2020 19:01 )             36.5     12-29    137  |  95<L>  |  47<H>  ----------------------------<  138<H>  4.5   |  23  |  14.40<H>    Ca    8.1<L>      29 Dec 2020 12:20  Phos  7.0     12-29  Mg     2.5     12-29    TPro  8.1  /  Alb  3.7  /  TBili  0.7  /  DBili  x   /  AST  4<L>  /  ALT  11  /  AlkPhos  143<H>  12-29    LIVER FUNCTIONS - ( 29 Dec 2020 12:20 )  Alb: 3.7 g/dL / Pro: 8.1 g/dL / ALK PHOS: 143 U/L / ALT: 11 U/L DA / AST: 4 U/L / GGT: x           ______________________________________________________________________

## 2020-12-30 NOTE — PROGRESS NOTE ADULT - PROBLEM SELECTOR PLAN 5
- Patient on HD T/T/S  - Last HD on Thursday and he was supposed to have session on Saturday but did not get to go   - HD12/28 incomplete, refused 12/19  - Nephro Dr. Mosher  - Continue renvela   - Monitor BMP daily   - SW consult

## 2020-12-30 NOTE — PROGRESS NOTE ADULT - PROBLEM SELECTOR PLAN 1
continues with nausea and vomiting   c/w zofran prn  PPI  CT A/P showing thickened distal esophagus suggesting esophagitis  plan for EGD tomorrow  NPO after MN

## 2020-12-30 NOTE — PROGRESS NOTE ADULT - ASSESSMENT
Patient is a 60 year old male from [place] [address], with PMH of ESRD on HD TTS, HTN, DM, partially blind and s/p left BKA, who presented to the ED due to vomiting.     Hgb of 12.6. Potassium of 7.3 with repeat of 5.9. Creatinine of 19.80. COVID negative. CT abdomen showing thickened distal esophagus. Mild diffuse bladder wall thickening. Given NS bolus, LR bolus, lokelma, reglan and zofran in ED.  Patient is a 60 year old male from [place] [address], with PMH of ESRD on HD TTS, HTN, DM, partially blind and s/p left BKA, who presented with vomiting, and was admitted for dialysis and work-up of possible esophagitis vs uremia vs diabetic gastroparesis.     Hgb of 12.6. Potassium of 7.3 with repeat of 5.9. Creatinine of 19.80. COVID negative. CT abdomen showing thickened distal esophagus. Mild diffuse bladder wall thickening. Given NS bolus, LR bolus, lokelma, reglan and zofran in ED.

## 2020-12-30 NOTE — PROGRESS NOTE ADULT - PROBLEM SELECTOR PLAN 6
- Patient with Hgb of 10.8   - Likely secondary to ESRD  - On Epogen  - Monitor CBC daily  - Continue home med of folic acid - UNABLE TO FOLLOW LABS, - pt refuses daily labs   - Patient with Hgb of 10.8   - Likely secondary to ESRD  - On Epogen  - Monitor CBC daily  - Continue home med of folic acid

## 2020-12-30 NOTE — PROGRESS NOTE ADULT - SUBJECTIVE AND OBJECTIVE BOX
PGY-1 Progress Note discussed with attending    PAGER #: [[phone]] TILL 5:00 PM  PLEASE CONTACT ON CALL TEAM:  - On Call Team (Please refer to Dejon) FROM 5:00 PM - 8:30PM  - Nightfloat Team FROM 8:30 -7:30 AM    CHIEF COMPLAINT & BRIEF HOSPITAL COURSE:  Patient is a 60M from [facility], with PMH of ESRD on HD TTS, HTN, DM, partially blind and s/p left BKA, who presented to the ED due to vomiting. Patient states that he was feeling fine until today when he started to have nausea and multiple episodes of vomiting. Patient unable to state exactly how many episodes he had. Denies any signs of blood as far as he knows. Patient also had some abdominal pain mainly located on RLQ but states it is improved now. Patient states that he has had similar episodes previously and has gone to hospital but unsure exactly about what is the cause of symptoms. Patient states he normally has HD sessions on T/T/S but it was changed due to the holidays, and he missed another session due to feeling sick. Currently, patient denies any chest pain, shortness of breath, headache, dizziness or abdominal pain. He was admitted for management of intractable vomiting which is likely 2/2 to diabetic gastroparesis vs electrolyte derangements. He was mildly improved on reglan, and was not able to tolerate the entire session of dialysis 12/28. After this, he refused AM meds and blood draws, and his BP remained elevated due to rebound HTN. Clonidine PO was made patch and labetalol 5mg push given with hydralazine PRN pushes.     INTERVAL HPI/OVERNIGHT EVENTS:   Patient was examined while he was lying in bed, Aox3, responding in single words to questions, hunched over vomit tray, actively retching/vomiting. He has not passed bowel movements in a few days, and is not able to tolerate clears diet. Normal bladder movements. Pt refused AM labs, blood draws, and repeat dialysis. Planned for EGD tomorrow to investigate esophageal thickening on CT. Vomiting etiology may be uremic vs diabetic gastroparesis vs esophagitis.     REVIEW OF SYSTEMS:  CONSTITUTIONAL: Fatigue + No fever, weight loss  RESPIRATORY: No cough, wheezing, chills or hemoptysis; No shortness of breath  CARDIOVASCULAR: No chest pain, palpitations, dizziness, or leg swelling  GASTROINTESTINAL: RUQ Abdominal pain +, Nausea, vomiting, retching+  No hematemesis; No diarrhea or constipation. No melena or hematochezia.  GENITOURINARY: No dysuria or hematuria, urinary frequency  NEUROLOGICAL: No headaches, memory loss, loss of strength, numbness, or tremors  SKIN: No itching, burning, rashes, or lesions     MEDICATIONS  (STANDING):  calcium acetate 667 milliGRAM(s) Oral three times a day with meals  cloNIDine 0.1 milliGRAM(s) Oral daily  epoetin beverley-epbx (RETACRIT) Injectable 4000 Unit(s) IV Push <User Schedule>  folic acid 1 milliGRAM(s) Oral daily  heparin   Injectable 5000 Unit(s) SubCutaneous every 8 hours  hydrALAZINE 50 milliGRAM(s) Oral every 8 hours  insulin lispro (ADMELOG) corrective regimen sliding scale   SubCutaneous Before meals and at bedtime  metoclopramide 5 milliGRAM(s) Oral three times a day  metoprolol tartrate 12.5 milliGRAM(s) Oral two times a day  nortriptyline 10 milliGRAM(s) Oral daily  pantoprazole  Injectable 40 milliGRAM(s) IV Push daily  sevelamer carbonate 1600 milliGRAM(s) Oral three times a day with meals  simvastatin 40 milliGRAM(s) Oral at bedtime    MEDICATIONS  (PRN):  acetaminophen   Tablet .. 650 milliGRAM(s) Oral every 6 hours PRN Temp greater or equal to 38C (100.4F), Moderate Pain (4 - 6)  guaiFENesin   Syrup  (Sugar-Free) 100 milliGRAM(s) Oral every 6 hours PRN Cough  ondansetron Injectable 4 milliGRAM(s) IV Push every 6 hours PRN Nausea and/or Vomiting  oxycodone    5 mG/acetaminophen 325 mG 1 Tablet(s) Oral every 6 hours PRN Severe Pain (7 - 10)      Vital Signs Last 24 Hrs  T(C): 36.3 (30 Dec 2020 08:41), Max: 37 (29 Dec 2020 23:55)  T(F): 97.4 (30 Dec 2020 08:41), Max: 98.6 (29 Dec 2020 23:55)  HR: 105 (30 Dec 2020 08:41) (101 - 119)  BP: 185/94 (30 Dec 2020 08:41) (160/75 - 195/97)  BP(mean): --  RR: 17 (30 Dec 2020 08:41) (17 - 18)  SpO2: 96% (30 Dec 2020 08:41) (95% - 96%)    PHYSICAL EXAMINATION:  GENERAL: NAD, well built AAM, vomiting lying in bed   HEAD:  Atraumatic, Normocephalic  EYES:  conjunctiva and sclera clear  NECK: Supple, No JVD, Normal thyroid  CHEST/LUNG: Clear to auscultation. Clear to percussion bilaterally; No rales, rhonchi, wheezing, or rubs  HEART: Regular rate and rhythm; No murmurs, rubs, or gallops  ABDOMEN: Soft, Nontender, Nondistended; Bowel sounds increased   NERVOUS SYSTEM:  Alert & Oriented X3, No motor or sensory deficits   EXTREMITIES: R AVF in place 2+ Peripheral Pulses, No clubbing, cyanosis, or edema  SKIN: warm dry                          11.4   4.23  )-----------( 151      ( 29 Dec 2020 19:01 )             36.5     12-29    137  |  95<L>  |  47<H>  ----------------------------<  138<H>  4.5   |  23  |  14.40<H>    Ca    8.1<L>      29 Dec 2020 12:20  Phos  7.0     12-29  Mg     2.5     12-29    TPro  8.1  /  Alb  3.7  /  TBili  0.7  /  DBili  x   /  AST  4<L>  /  ALT  11  /  AlkPhos  143<H>  12-29    LIVER FUNCTIONS - ( 29 Dec 2020 12:20 )  Alb: 3.7 g/dL / Pro: 8.1 g/dL / ALK PHOS: 143 U/L / ALT: 11 U/L DA / AST: 4 U/L / GGT: x                   CAPILLARY BLOOD GLUCOSE      RADIOLOGY & ADDITIONAL TESTS:                   PGY-1 Progress Note discussed with attending    PAGER #: [[phone]] TILL 5:00 PM  PLEASE CONTACT ON CALL TEAM:  - On Call Team (Please refer to Dejon) FROM 5:00 PM - 8:30PM  - Nightfloat Team FROM 8:30 -7:30 AM    CHIEF COMPLAINT & BRIEF HOSPITAL COURSE:  Patient is a 60M from [facility], with PMH of ESRD on HD TTS, HTN, DM, partially blind and s/p left BKA, who presented to the ED due to vomiting. Patient states that he was feeling fine until today when he started to have nausea and multiple episodes of vomiting. Patient unable to state exactly how many episodes he had. Denies any signs of blood as far as he knows. Patient also had some abdominal pain mainly located on RLQ but states it is improved now. Patient states that he has had similar episodes previously and has gone to hospital but unsure exactly about what is the cause of symptoms. Patient states he normally has HD sessions on T/T/S but it was changed due to the holidays, and he missed another session due to feeling sick. Currently, patient denies any chest pain, shortness of breath, headache, dizziness or abdominal pain. He was admitted for management of intractable vomiting which is likely 2/2 to diabetic gastroparesis vs electrolyte derangements. He was mildly improved on reglan, and was not able to tolerate the entire session of dialysis 12/28. After this, he refused AM meds and blood draws, and his BP remained elevated due to rebound HTN. Clonidine PO was made patch and labetalol 5mg push given with hydralazine PRN pushes.     INTERVAL HPI/OVERNIGHT EVENTS:   Patient was examined while he was lying in bed, Aox3, responding in single words to questions, hunched over vomit tray, actively retching/vomiting. He has not passed bowel movements in a few days, and is not able to tolerate clears diet. Normal bladder movements. Pt refused AM labs, blood draws, and repeat dialysis. Planned for EGD tomorrow to investigate esophageal thickening on CT. Vomiting etiology may be uremic vs diabetic gastroparesis vs esophagitis.     REVIEW OF SYSTEMS:  CONSTITUTIONAL: Fatigue + No fever, weight loss  RESPIRATORY: No cough, wheezing, chills or hemoptysis; No shortness of breath  CARDIOVASCULAR: No chest pain, palpitations, dizziness, or leg swelling  GASTROINTESTINAL: RUQ Abdominal pain +, Nausea, vomiting, retching+  No hematemesis; No diarrhea or constipation. No melena or hematochezia.  GENITOURINARY: No dysuria or hematuria, urinary frequency  NEUROLOGICAL: No headaches, memory loss, loss of strength, numbness, or tremors  SKIN: No itching, burning, rashes, or lesions     MEDICATIONS  (STANDING):  calcium acetate 667 milliGRAM(s) Oral three times a day with meals  cloNIDine 0.1 milliGRAM(s) Oral daily  epoetin beverley-epbx (RETACRIT) Injectable 4000 Unit(s) IV Push <User Schedule>  folic acid 1 milliGRAM(s) Oral daily  heparin   Injectable 5000 Unit(s) SubCutaneous every 8 hours  hydrALAZINE 50 milliGRAM(s) Oral every 8 hours  insulin lispro (ADMELOG) corrective regimen sliding scale   SubCutaneous Before meals and at bedtime  metoclopramide 5 milliGRAM(s) Oral three times a day  metoprolol tartrate 12.5 milliGRAM(s) Oral two times a day  nortriptyline 10 milliGRAM(s) Oral daily  pantoprazole  Injectable 40 milliGRAM(s) IV Push daily  sevelamer carbonate 1600 milliGRAM(s) Oral three times a day with meals  simvastatin 40 milliGRAM(s) Oral at bedtime    MEDICATIONS  (PRN):  acetaminophen   Tablet .. 650 milliGRAM(s) Oral every 6 hours PRN Temp greater or equal to 38C (100.4F), Moderate Pain (4 - 6)  guaiFENesin   Syrup  (Sugar-Free) 100 milliGRAM(s) Oral every 6 hours PRN Cough  ondansetron Injectable 4 milliGRAM(s) IV Push every 6 hours PRN Nausea and/or Vomiting  oxycodone    5 mG/acetaminophen 325 mG 1 Tablet(s) Oral every 6 hours PRN Severe Pain (7 - 10)      Vital Signs Last 24 Hrs  T(C): 36.3 (30 Dec 2020 08:41), Max: 37 (29 Dec 2020 23:55)  T(F): 97.4 (30 Dec 2020 08:41), Max: 98.6 (29 Dec 2020 23:55)  HR: 105 (30 Dec 2020 08:41) (101 - 119)  BP: 185/94 (30 Dec 2020 08:41) (160/75 - 195/97)  BP(mean): --  RR: 17 (30 Dec 2020 08:41) (17 - 18)  SpO2: 96% (30 Dec 2020 08:41) (95% - 96%)    PHYSICAL EXAMINATION:  GENERAL: NAD, thinly built AAM, vomiting lying in bed   HEAD:  Atraumatic, Normocephalic  EYES:  conjunctiva and sclera clear  NECK: Supple, No JVD, Normal thyroid  CHEST/LUNG: Clear to auscultation. Clear to percussion bilaterally; No rales, rhonchi, wheezing, or rubs  HEART: Regular rate and rhythm; No murmurs, rubs, or gallops  ABDOMEN: Soft, Nontender, Nondistended; Bowel sounds increased   NERVOUS SYSTEM:  Alert & Oriented X3, No motor or sensory deficits   EXTREMITIES: R AVF in place 2+ Peripheral Pulses, No clubbing, cyanosis, or edema  SKIN: warm dry                          11.4   4.23  )-----------( 151      ( 29 Dec 2020 19:01 )             36.5     12-29    137  |  95<L>  |  47<H>  ----------------------------<  138<H>  4.5   |  23  |  14.40<H>    Ca    8.1<L>      29 Dec 2020 12:20  Phos  7.0     12-29  Mg     2.5     12-29    TPro  8.1  /  Alb  3.7  /  TBili  0.7  /  DBili  x   /  AST  4<L>  /  ALT  11  /  AlkPhos  143<H>  12-29    LIVER FUNCTIONS - ( 29 Dec 2020 12:20 )  Alb: 3.7 g/dL / Pro: 8.1 g/dL / ALK PHOS: 143 U/L / ALT: 11 U/L DA / AST: 4 U/L / GGT: x                   CAPILLARY BLOOD GLUCOSE      RADIOLOGY & ADDITIONAL TESTS:                   PGY-1 Progress Note discussed with attending    PAGER #: [[phone]] TILL 5:00 PM  PLEASE CONTACT ON CALL TEAM:  - On Call Team (Please refer to Dejon) FROM 5:00 PM - 8:30PM  - Nightfloat Team FROM 8:30 -7:30 AM    CHIEF COMPLAINT & BRIEF HOSPITAL COURSE:  Patient is a 60M from [facility], with PMH of ESRD on HD TTS, HTN, DM, partially blind and s/p left BKA, who presented to the ED due to vomiting. Patient states that he was feeling fine until today when he started to have nausea and multiple episodes of vomiting. Patient unable to state exactly how many episodes he had. Denies any signs of blood as far as he knows. Patient also had some abdominal pain mainly located on RLQ but states it is improved now. Patient states that he has had similar episodes previously and has gone to hospital but unsure exactly about what is the cause of symptoms. Patient states he normally has HD sessions on T/T/S but it was changed due to the holidays, and he missed another session due to feeling sick. Currently, patient denies any chest pain, shortness of breath, headache, dizziness or abdominal pain. He was admitted for management of intractable vomiting which is likely 2/2 to diabetic gastroparesis vs electrolyte derangements. He was mildly improved on reglan, and was not able to tolerate the entire session of dialysis 12/28. After this, he refused AM meds and blood draws, and his BP remained elevated due to rebound HTN. Clonidine PO was made patch and labetalol 5mg push given with hydralazine PRN pushes.     INTERVAL HPI/OVERNIGHT EVENTS:   Patient was examined while he was lying in bed, Aox3, responding in single words to questions, hunched over vomit tray, actively retching/vomiting. He has not passed bowel movements in a few days, and is not able to tolerate clears diet. Normal bladder movements. Pt refused AM labs, blood draws, and repeat dialysis. Planned for EGD tomorrow to investigate esophageal thickening on CT. Vomiting etiology may be uremic vs diabetic gastroparesis vs esophagitis.     REVIEW OF SYSTEMS:  CONSTITUTIONAL: Fatigue + No fever, weight loss  RESPIRATORY: No cough, wheezing, chills or hemoptysis; No shortness of breath  CARDIOVASCULAR: No chest pain, palpitations, dizziness, or leg swelling  GASTROINTESTINAL: RUQ Abdominal pain +, Nausea, vomiting, retching+  No hematemesis; No diarrhea or constipation. No melena or hematochezia.  GENITOURINARY: No dysuria or hematuria, urinary frequency  NEUROLOGICAL: No headaches, memory loss, loss of strength, numbness, or tremors  SKIN: No itching, burning, rashes, or lesions     MEDICATIONS  (STANDING):  calcium acetate 667 milliGRAM(s) Oral three times a day with meals  cloNIDine 0.1 milliGRAM(s) Oral daily  epoetin beverley-epbx (RETACRIT) Injectable 4000 Unit(s) IV Push <User Schedule>  folic acid 1 milliGRAM(s) Oral daily  heparin   Injectable 5000 Unit(s) SubCutaneous every 8 hours  hydrALAZINE 50 milliGRAM(s) Oral every 8 hours  insulin lispro (ADMELOG) corrective regimen sliding scale   SubCutaneous Before meals and at bedtime  metoclopramide 5 milliGRAM(s) Oral three times a day  metoprolol tartrate 12.5 milliGRAM(s) Oral two times a day  nortriptyline 10 milliGRAM(s) Oral daily  pantoprazole  Injectable 40 milliGRAM(s) IV Push daily  sevelamer carbonate 1600 milliGRAM(s) Oral three times a day with meals  simvastatin 40 milliGRAM(s) Oral at bedtime    MEDICATIONS  (PRN):  acetaminophen   Tablet .. 650 milliGRAM(s) Oral every 6 hours PRN Temp greater or equal to 38C (100.4F), Moderate Pain (4 - 6)  guaiFENesin   Syrup  (Sugar-Free) 100 milliGRAM(s) Oral every 6 hours PRN Cough  ondansetron Injectable 4 milliGRAM(s) IV Push every 6 hours PRN Nausea and/or Vomiting  oxycodone    5 mG/acetaminophen 325 mG 1 Tablet(s) Oral every 6 hours PRN Severe Pain (7 - 10)      Vital Signs Last 24 Hrs  T(C): 36.3 (30 Dec 2020 08:41), Max: 37 (29 Dec 2020 23:55)  T(F): 97.4 (30 Dec 2020 08:41), Max: 98.6 (29 Dec 2020 23:55)  HR: 105 (30 Dec 2020 08:41) (101 - 119)  BP: 185/94 (30 Dec 2020 08:41) (160/75 - 195/97)  BP(mean): --  RR: 17 (30 Dec 2020 08:41) (17 - 18)  SpO2: 96% (30 Dec 2020 08:41) (95% - 96%)    PHYSICAL EXAMINATION:  GENERAL: NAD, thinly built AAM, vomiting lying in bed   HEAD:  Atraumatic, Normocephalic  EYES:  conjunctiva and sclera clear  NECK: Supple, No JVD  CHEST/LUNG: Clear to auscultation.  No rales, rhonchi, wheezing, or rubs  HEART: Regular rate and rhythm; No murmurs, rubs, or gallops  ABDOMEN: Soft, Nontender, Nondistended; Bowel sounds increased   NERVOUS SYSTEM:  Alert & Oriented X3, No motor or sensory deficits   EXTREMITIES: R AVF in place 2+ Peripheral Pulses, No clubbing, cyanosis, or edema  SKIN: warm dry         LABS:      CBC Full  -  ( 30 Dec 2020 18:27 )  WBC Count : 7.64 K/uL  RBC Count : 4.14 M/uL  Hemoglobin : 11.7 g/dL  Hematocrit : 37.4 %  Platelet Count - Automated : 157 K/uL  Mean Cell Volume : 90.3 fl  Mean Cell Hemoglobin : 28.3 pg  Mean Cell Hemoglobin Concentration : 31.3 gm/dL  Auto Neutrophil # : x  Auto Lymphocyte # : x  Auto Monocyte # : x  Auto Eosinophil # : x  Auto Basophil # : x  Auto Neutrophil % : x  Auto Lymphocyte % : x  Auto Monocyte % : x  Auto Eosinophil % : x  Auto Basophil % : x    12-30    138  |  92<L>  |  67<H>  ----------------------------<  189<H>  4.4   |  25  |  19.10<H>    Ca    7.6<L>      30 Dec 2020 18:27  Phos  7.0     12-29  Mg     2.5     12-29    TPro  8.1  /  Alb  3.7  /  TBili  0.7  /  DBili  x   /  AST  4<L>  /  ALT  11  /  AlkPhos  143<H>  12-29                      RADIOLOGY & ADDITIONAL STUDIES (The following images were personally reviewed):    c< from: CT Abdomen and Pelvis No Cont (12.27.20 @ 18:20) >    EXAM:  CT ABDOMEN AND PELVIS                            PROCEDURE DATE:  12/27/2020          INTERPRETATION:  Clinical information: Right lower quadrant pain, nausea and vomiting.    Comparison: 8/26/2019 CT scan of the abdomen and pelvis    PROCEDURE:  CT of the abdomen and pelvis was performed without the administration of oral or intravenous contrast.    Evaluation is limited without oral or intravenous contrast.    FINDINGS:    LOWER CHEST: Subsegmental atelectasis bilaterally.    ABDOMEN:  LIVER: within normal limits.  BILE DUCTS: normal caliber.  GALLBLADDER: Trace amount of sludge or tiny stone.  PANCREAS: Dilatation of the pancreatic duct to 5 mm is unchanged.  SPLEEN: within normal limits.  ADRENALS: Bilateral adrenal gland thickening.  KIDNEYS: within normal limits.    PELVIS:  REPRODUCTIVE ORGANS: no pelvic masses.  URETERS: within normal limits.  BLADDER: Mild diffuse bladder wall thickening.      BOWEL: Again noted is thickening of the distal esophagus which is partially imaged suggesting esophagitis. There are scattered colonic diverticula. There is residual contrast within the colon.  PERITONEUM: no ascites or free air, no fluid collection.  VESSELS: Atherosclerotic changes  RETROPERITONEUM: within normal limits.  ABDOMINAL WALL: within normal limits.  BONES: There is ankylosis of the T11/12 vertebral bodies.    IMPRESSION: Thickened distal esophagus again noted suggesting esophagitis. Direct visualization recommended when clinically feasible.    Mild diffuse bladder wall thickening.              PEÑA SUBRAMANIAN MD; Attending Radiologist  This document has been electronically signed. Dec 27 2020  6:36PM    < end of copied text >

## 2020-12-30 NOTE — PROGRESS NOTE ADULT - PROBLEM SELECTOR PLAN 4
- Phosphorus is 6.6  - C/w phoslo and monitor - UNABLE TO FOLLOW LABS, - pt refuses daily labs   - Phosphorus is 6.6  - C/w phoslo and monitor

## 2020-12-30 NOTE — PROGRESS NOTE ADULT - PROBLEM SELECTOR PLAN 9
- C/w heparin for DVT prophylaxis  IMPROVE VTE Individual Risk Assessment  RISK                                                                Points  [  ] Previous VTE                                                  3  [x ] Thrombophilia                                               2  [  ] Lower limb paralysis                                      2  [  ] Current Cancer                                              2   [  ] Immobilization > 24 hrs                                1  [  ] ICU/CCU stay > 24 hours                              1  [  ] Age > 60                                                      1  IMPROVE VTE Score _____2____

## 2020-12-30 NOTE — PROGRESS NOTE ADULT - SUBJECTIVE AND OBJECTIVE BOX
[facility] : Progress Note :: [phone], (office [phone]),   Dr Mosher / Dr Washington / Dr Young / Dr Doll / Dr Varsha TURCIOS / Dr Tello / Dr Garcia / Dr Larry qiu  _____________________________________________________________________________________________    Refusing dialysis today.  Awaits endocospy    fish (Rash)  liver (Anaphylaxis)  No Known Drug Allergies    Hospital Medications:   MEDICATIONS  (STANDING):  calcium acetate 667 milliGRAM(s) Oral three times a day with meals  cloNIDine 0.1 milliGRAM(s) Oral daily  epoetin beverley-epbx (RETACRIT) Injectable 4000 Unit(s) IV Push <User Schedule>  folic acid 1 milliGRAM(s) Oral daily  heparin   Injectable 5000 Unit(s) SubCutaneous every 8 hours  hydrALAZINE 50 milliGRAM(s) Oral every 8 hours  insulin lispro (ADMELOG) corrective regimen sliding scale   SubCutaneous Before meals and at bedtime  metoclopramide 5 milliGRAM(s) Oral three times a day  metoprolol tartrate 12.5 milliGRAM(s) Oral two times a day  nortriptyline 10 milliGRAM(s) Oral daily  pantoprazole  Injectable 40 milliGRAM(s) IV Push daily  sevelamer carbonate 1600 milliGRAM(s) Oral three times a day with meals  simvastatin 40 milliGRAM(s) Oral at bedtime        VITALS:  T(F): 97.4 (12-30-20 @ 08:41), Max: 98.6 (12-29-20 @ 23:55)  HR: 113 (12-30-20 @ 14:29)  BP: 178/90 (12-30-20 @ 14:29)  RR: 17 (12-30-20 @ 08:41)  SpO2: 96% (12-30-20 @ 08:41)  Wt(kg): --      PHYSICAL EXAM:  Constitutional: NAD  HEENT: anicteric sclera, oropharynx clear.  Neck: No JVD  Respiratory: CTAB, no wheezes, rales or rhonchi  Cardiovascular: S1, S2, RRR  Gastrointestinal: BS+, soft, NT/ND  Extremities: peripheral edema  Neurological: A/O x 3, no focal deficits  Vascular Access: AVF with thrill and bruit    LABS:  12-29    137  |  95<L>  |  47<H>  ----------------------------<  138<H>  4.5   |  23  |  14.40<H>    Ca    8.1<L>      29 Dec 2020 12:20  Phos  7.0     12-29  Mg     2.5     12-29    TPro  8.1  /  Alb  3.7  /  TBili  0.7  /  DBili      /  AST  4<L>  /  ALT  11  /  AlkPhos  143<H>  12-29    Creatinine Trend: 14.40 <--, 19.90 <--, 18.10 <--, 19.80 <--                        11.4   4.23  )-----------( 151      ( 29 Dec 2020 19:01 )             36.5     Urine Studies:      RADIOLOGY & ADDITIONAL STUDIES:

## 2020-12-31 DIAGNOSIS — Z04.6 ENCOUNTER FOR GENERAL PSYCHIATRIC EXAMINATION, REQUESTED BY AUTHORITY: ICD-10-CM

## 2020-12-31 DIAGNOSIS — F05 DELIRIUM DUE TO KNOWN PHYSIOLOGICAL CONDITION: ICD-10-CM

## 2020-12-31 DIAGNOSIS — F43.21 ADJUSTMENT DISORDER WITH DEPRESSED MOOD: ICD-10-CM

## 2020-12-31 DIAGNOSIS — F14.21 COCAINE DEPENDENCE, IN REMISSION: ICD-10-CM

## 2020-12-31 LAB
ANION GAP SERPL CALC-SCNC: 19 MMOL/L — HIGH (ref 5–17)
ANISOCYTOSIS BLD QL: SLIGHT — SIGNIFICANT CHANGE UP
BASOPHILS # BLD AUTO: 0 K/UL — SIGNIFICANT CHANGE UP (ref 0–0.2)
BASOPHILS NFR BLD AUTO: 0 % — SIGNIFICANT CHANGE UP (ref 0–2)
BUN SERPL-MCNC: 78 MG/DL — HIGH (ref 7–18)
CALCIUM SERPL-MCNC: 7.5 MG/DL — LOW (ref 8.4–10.5)
CHLORIDE SERPL-SCNC: 95 MMOL/L — LOW (ref 96–108)
CO2 SERPL-SCNC: 27 MMOL/L — SIGNIFICANT CHANGE UP (ref 22–31)
CREAT SERPL-MCNC: 20.5 MG/DL — HIGH (ref 0.5–1.3)
ELLIPTOCYTES BLD QL SMEAR: SLIGHT — SIGNIFICANT CHANGE UP
EOSINOPHIL # BLD AUTO: 0 K/UL — SIGNIFICANT CHANGE UP (ref 0–0.5)
EOSINOPHIL NFR BLD AUTO: 0 % — SIGNIFICANT CHANGE UP (ref 0–6)
FRUCTOSAMINE SERPL-MCNC: 406 UMOL/L — HIGH (ref 205–285)
GLUCOSE BLDC GLUCOMTR-MCNC: 107 MG/DL — HIGH (ref 70–99)
GLUCOSE BLDC GLUCOMTR-MCNC: 118 MG/DL — HIGH (ref 70–99)
GLUCOSE BLDC GLUCOMTR-MCNC: 82 MG/DL — SIGNIFICANT CHANGE UP (ref 70–99)
GLUCOSE SERPL-MCNC: 110 MG/DL — HIGH (ref 70–99)
HCT VFR BLD CALC: 36.7 % — LOW (ref 39–50)
HGB BLD-MCNC: 11.4 G/DL — LOW (ref 13–17)
HYPOCHROMIA BLD QL: SLIGHT — SIGNIFICANT CHANGE UP
LYMPHOCYTES # BLD AUTO: 0.44 K/UL — LOW (ref 1–3.3)
LYMPHOCYTES # BLD AUTO: 7 % — LOW (ref 13–44)
MACROCYTES BLD QL: SLIGHT — SIGNIFICANT CHANGE UP
MAGNESIUM SERPL-MCNC: 2.5 MG/DL — SIGNIFICANT CHANGE UP (ref 1.6–2.6)
MANUAL SMEAR VERIFICATION: SIGNIFICANT CHANGE UP
MCHC RBC-ENTMCNC: 27.9 PG — SIGNIFICANT CHANGE UP (ref 27–34)
MCHC RBC-ENTMCNC: 31.1 GM/DL — LOW (ref 32–36)
MCV RBC AUTO: 89.7 FL — SIGNIFICANT CHANGE UP (ref 80–100)
MONOCYTES # BLD AUTO: 0.69 K/UL — SIGNIFICANT CHANGE UP (ref 0–0.9)
MONOCYTES NFR BLD AUTO: 11 % — SIGNIFICANT CHANGE UP (ref 2–14)
NEUTROPHILS # BLD AUTO: 5.04 K/UL — SIGNIFICANT CHANGE UP (ref 1.8–7.4)
NEUTROPHILS NFR BLD AUTO: 80 % — HIGH (ref 43–77)
NRBC # BLD: 0 /100 — SIGNIFICANT CHANGE UP (ref 0–0)
PHOSPHATE SERPL-MCNC: 8.2 MG/DL — HIGH (ref 2.5–4.5)
PLAT MORPH BLD: ABNORMAL
PLATELET # BLD AUTO: 143 K/UL — LOW (ref 150–400)
POIKILOCYTOSIS BLD QL AUTO: SLIGHT — SIGNIFICANT CHANGE UP
POTASSIUM SERPL-MCNC: 4.3 MMOL/L — SIGNIFICANT CHANGE UP (ref 3.5–5.3)
POTASSIUM SERPL-SCNC: 4.3 MMOL/L — SIGNIFICANT CHANGE UP (ref 3.5–5.3)
RBC # BLD: 4.09 M/UL — LOW (ref 4.2–5.8)
RBC # FLD: 18.8 % — HIGH (ref 10.3–14.5)
RBC BLD AUTO: ABNORMAL
SCHISTOCYTES BLD QL AUTO: SLIGHT — SIGNIFICANT CHANGE UP
SODIUM SERPL-SCNC: 141 MMOL/L — SIGNIFICANT CHANGE UP (ref 135–145)
STOMATOCYTES BLD QL SMEAR: SLIGHT — SIGNIFICANT CHANGE UP
TARGETS BLD QL SMEAR: SLIGHT — SIGNIFICANT CHANGE UP
VARIANT LYMPHS # BLD: 2 % — SIGNIFICANT CHANGE UP (ref 0–6)
WBC # BLD: 6.3 K/UL — SIGNIFICANT CHANGE UP (ref 3.8–10.5)
WBC # FLD AUTO: 6.3 K/UL — SIGNIFICANT CHANGE UP (ref 3.8–10.5)

## 2020-12-31 PROCEDURE — 99232 SBSQ HOSP IP/OBS MODERATE 35: CPT | Mod: GC

## 2020-12-31 PROCEDURE — 99232 SBSQ HOSP IP/OBS MODERATE 35: CPT

## 2020-12-31 PROCEDURE — 90792 PSYCH DIAG EVAL W/MED SRVCS: CPT

## 2020-12-31 RX ORDER — OLANZAPINE 15 MG/1
2.5 TABLET, FILM COATED ORAL ONCE
Refills: 0 | Status: COMPLETED | OUTPATIENT
Start: 2020-12-31 | End: 2020-12-31

## 2020-12-31 RX ORDER — METOPROLOL TARTRATE 50 MG
5 TABLET ORAL ONCE
Refills: 0 | Status: COMPLETED | OUTPATIENT
Start: 2020-12-31 | End: 2020-12-31

## 2020-12-31 RX ORDER — OLANZAPINE 15 MG/1
5 TABLET, FILM COATED ORAL ONCE
Refills: 0 | Status: COMPLETED | OUTPATIENT
Start: 2020-12-31 | End: 2020-12-31

## 2020-12-31 RX ADMIN — OLANZAPINE 2.5 MILLIGRAM(S): 15 TABLET, FILM COATED ORAL at 06:06

## 2020-12-31 RX ADMIN — Medication 1 PATCH: at 21:52

## 2020-12-31 RX ADMIN — Medication 5 MILLIGRAM(S): at 03:36

## 2020-12-31 RX ADMIN — Medication 5 MILLIGRAM(S): at 13:17

## 2020-12-31 RX ADMIN — OLANZAPINE 2.5 MILLIGRAM(S): 15 TABLET, FILM COATED ORAL at 01:15

## 2020-12-31 RX ADMIN — Medication 50 MILLIGRAM(S): at 05:32

## 2020-12-31 RX ADMIN — Medication 12.5 MILLIGRAM(S): at 05:33

## 2020-12-31 RX ADMIN — ERYTHROPOIETIN 4000 UNIT(S): 10000 INJECTION, SOLUTION INTRAVENOUS; SUBCUTANEOUS at 18:01

## 2020-12-31 RX ADMIN — Medication 1 PATCH: at 08:15

## 2020-12-31 RX ADMIN — Medication 5 MILLIGRAM(S): at 05:32

## 2020-12-31 RX ADMIN — SIMVASTATIN 40 MILLIGRAM(S): 20 TABLET, FILM COATED ORAL at 21:55

## 2020-12-31 RX ADMIN — Medication 5 MILLIGRAM(S): at 21:55

## 2020-12-31 RX ADMIN — HEPARIN SODIUM 5000 UNIT(S): 5000 INJECTION INTRAVENOUS; SUBCUTANEOUS at 21:54

## 2020-12-31 RX ADMIN — Medication 1 MILLIGRAM(S): at 11:26

## 2020-12-31 RX ADMIN — Medication 50 MILLIGRAM(S): at 21:55

## 2020-12-31 RX ADMIN — HEPARIN SODIUM 5000 UNIT(S): 5000 INJECTION INTRAVENOUS; SUBCUTANEOUS at 13:17

## 2020-12-31 RX ADMIN — Medication 50 MILLIGRAM(S): at 13:18

## 2020-12-31 RX ADMIN — Medication 1 MILLIGRAM(S): at 16:40

## 2020-12-31 NOTE — BEHAVIORAL HEALTH ASSESSMENT NOTE - NSBHCHARTREVIEWINVESTIGATE_PSY_A_CORE FT
< from: 12 Lead ECG (12.28.20 @ 00:07) >    QTC Calculation(Bazett) 482 ms    P Axis 73 degrees    R Axis 18 degrees    T Axis 70 degrees    Diagnosis Line Sinus tachycardia  Possible Left atrial enlargement  Borderline ECG

## 2020-12-31 NOTE — BEHAVIORAL HEALTH ASSESSMENT NOTE - OTHER
Acute on chronic renal failure requiring HD [facility]-bound Edentulous "Confused" See above H/o cocaine use DO, sober for past 3 yrs

## 2020-12-31 NOTE — BEHAVIORAL HEALTH ASSESSMENT NOTE - DIFFERENTIAL
Delirium 2/2 another medical condition  Adjustment DO with depressed mood  Cocaine use DO in sustained remission  Encounter for general psychiatric examination, requested by authority

## 2020-12-31 NOTE — BEHAVIORAL HEALTH ASSESSMENT NOTE - NSBHSOCIALHXDETAILSFT_PSY_A_CORE
B/R in [facility], [facility]. Had 5 siblings (4 sisters, 1 brother) but 3 sisters now  (2 from HIV, 1 from asthma). Was homeless for 10 yrs i/s/o crack cocaine use but stopped using around 2017, now in assisted living. Has 2 sons but has very limited contact (1 lives in [place], 1 in [place]). Brother Georgi King is his HCP.

## 2020-12-31 NOTE — BEHAVIORAL HEALTH ASSESSMENT NOTE - RISK ASSESSMENT
Risk factors: Older age, multiple medical problems, h/o GRAJEDA (in remission). Protective factors: Absent h/o SI/SA/SIB, stable domicile, supportive brother/HCP, help seeking. Risk level appears low at the time of assessment. Low Acute Suicide Risk

## 2020-12-31 NOTE — PROGRESS NOTE ADULT - PROBLEM SELECTOR PLAN 3
- UNABLE TO FOLLOW LABS, - pt refuses daily labs   - Potassium of 5.5 and most likely due to ESRD.  - will continue to monitor potassium   - Nephro Dr. Mosher consulted

## 2020-12-31 NOTE — BEHAVIORAL HEALTH ASSESSMENT NOTE - NSBHCHARTREVIEWIMAGING_PSY_A_CORE FT
< from: CT Abdomen and Pelvis No Cont (12.27.20 @ 18:20) >      IMPRESSION: Thickened distal esophagus again noted suggesting esophagitis. Direct visualization recommended when clinically feasible.    Mild diffuse bladder wall thickening.

## 2020-12-31 NOTE — PROGRESS NOTE ADULT - SUBJECTIVE AND OBJECTIVE BOX
PGY-1 Progress Note discussed with attending    PAGER #: [[phone]] TILL 5:00 PM  PLEASE CONTACT ON CALL TEAM:  - On Call Team (Please refer to Dejon) FROM 5:00 PM - 8:30PM  - Nightfloat Team FROM 8:30 -7:30 AM    CHIEF COMPLAINT & BRIEF HOSPITAL COURSE:    INTERVAL HPI/OVERNIGHT EVENTS:       REVIEW OF SYSTEMS:  CONSTITUTIONAL: No fever, weight loss, or fatigue  RESPIRATORY: No cough, wheezing, chills or hemoptysis; No shortness of breath  CARDIOVASCULAR: No chest pain, palpitations, dizziness, or leg swelling  GASTROINTESTINAL: No abdominal pain. No nausea, vomiting, or hematemesis; No diarrhea or constipation. No melena or hematochezia.  GENITOURINARY: No dysuria or hematuria, urinary frequency  NEUROLOGICAL: No headaches, memory loss, loss of strength, numbness, or tremors  SKIN: No itching, burning, rashes, or lesions     MEDICATIONS  (STANDING):  calcium acetate 667 milliGRAM(s) Oral three times a day with meals  cloNIDine Patch 0.1 mG/24Hr(s) 1 patch Transdermal <User Schedule>  epoetin beverley-epbx (RETACRIT) Injectable 4000 Unit(s) IV Push <User Schedule>  folic acid 1 milliGRAM(s) Oral daily  heparin   Injectable 5000 Unit(s) SubCutaneous every 8 hours  hydrALAZINE 50 milliGRAM(s) Oral every 8 hours  insulin lispro (ADMELOG) corrective regimen sliding scale   SubCutaneous Before meals and at bedtime  metoclopramide 5 milliGRAM(s) Oral three times a day  metoprolol tartrate 12.5 milliGRAM(s) Oral two times a day  nortriptyline 10 milliGRAM(s) Oral daily  pantoprazole  Injectable 40 milliGRAM(s) IV Push daily  sevelamer carbonate 1600 milliGRAM(s) Oral three times a day with meals  simvastatin 40 milliGRAM(s) Oral at bedtime    MEDICATIONS  (PRN):  acetaminophen   Tablet .. 650 milliGRAM(s) Oral every 6 hours PRN Temp greater or equal to 38C (100.4F), Moderate Pain (4 - 6)  guaiFENesin   Syrup  (Sugar-Free) 100 milliGRAM(s) Oral every 6 hours PRN Cough  hydrALAZINE Injectable 5 milliGRAM(s) IV Push every 8 hours PRN SBP >160  ondansetron Injectable 4 milliGRAM(s) IV Push every 6 hours PRN Nausea and/or Vomiting  oxycodone    5 mG/acetaminophen 325 mG 1 Tablet(s) Oral every 6 hours PRN Severe Pain (7 - 10)      Vital Signs Last 24 Hrs  T(C): 36.6 (31 Dec 2020 00:08), Max: 36.8 (30 Dec 2020 16:49)  T(F): 97.8 (31 Dec 2020 00:08), Max: 98.2 (30 Dec 2020 16:49)  HR: 101 (31 Dec 2020 05:58) (101 - 113)  BP: 155/94 (31 Dec 2020 05:58) (155/94 - 186/96)  BP(mean): --  RR: 18 (31 Dec 2020 00:08) (17 - 18)  SpO2: 96% (31 Dec 2020 00:08) (96% - 96%)    PHYSICAL EXAMINATION:  GENERAL: NAD, well built  HEAD:  Atraumatic, Normocephalic  EYES:  conjunctiva and sclera clear  NECK: Supple, No JVD, Normal thyroid  CHEST/LUNG: Clear to auscultation. Clear to percussion bilaterally; No rales, rhonchi, wheezing, or rubs  HEART: Regular rate and rhythm; No murmurs, rubs, or gallops  ABDOMEN: Soft, Nontender, Nondistended; Bowel sounds present  NERVOUS SYSTEM:  Alert & Oriented X3,    EXTREMITIES:  2+ Peripheral Pulses, No clubbing, cyanosis, or edema  SKIN: warm dry                          11.7   7.64  )-----------( 157      ( 30 Dec 2020 18:27 )             37.4     12-30    138  |  92<L>  |  67<H>  ----------------------------<  189<H>  4.4   |  25  |  19.10<H>    Ca    7.6<L>      30 Dec 2020 18:27  Phos  7.0     12-29  Mg     2.5     12-29    TPro  8.1  /  Alb  3.7  /  TBili  0.7  /  DBili  x   /  AST  4<L>  /  ALT  11  /  AlkPhos  143<H>  12-29    LIVER FUNCTIONS - ( 29 Dec 2020 12:20 )  Alb: 3.7 g/dL / Pro: 8.1 g/dL / ALK PHOS: 143 U/L / ALT: 11 U/L DA / AST: 4 U/L / GGT: x                   CAPILLARY BLOOD GLUCOSE      RADIOLOGY & ADDITIONAL TESTS:                   PGY-1 Progress Note discussed with attending    PAGER #: [[phone]] TILL 5:00 PM  PLEASE CONTACT ON CALL TEAM:  - On Call Team (Please refer to Dejon) FROM 5:00 PM - 8:30PM  - Nightfloat Team FROM 8:30 -7:30 AM    CHIEF COMPLAINT & BRIEF HOSPITAL COURSE:  [facility] 60[facility] PMH of ESRD on HD TTS, HTN, DM, partially blind and s/p left BKA, who presented to the ED due to vomiting. Patient states that he was feeling fine until today when he started to have nausea and multiple episodes of vomiting. Patient unable to state exactly how many episodes he had. Denies any signs of blood as far as he knows. Patient also had some abdominal pain mainly located on RLQ but states it is improved now. Patient states that he has had similar episodes previously and has gone to hospital but unsure exactly about what is the cause of symptoms. Patient states he normally has HD sessions on T/T/S but it was changed due to the holidays, and he missed another session due to feeling sick. Currently, patient denies any chest pain, shortness of breath, headache, dizziness or abdominal pain. He was admitted for management of intractable vomiting which is likely 2/2 to diabetic gastroparesis vs electrolyte derangements. He was mildly improved on Reglan, and was not able to tolerate the entire session of dialysis 12/28. After this, he refused AM meds and blood draws, and his BP remained elevated due to rebound HTN. Clonidine PO was made patch and labetalol 5mg push given with hydralazine PRN pushes. Pt keeps refusing labs, and is unable to finish a full session of dialysis. His brother and HCP (Georgi newman) was contacted and reaffirmed [facility] as full code status.     INTERVAL HPI/OVERNIGHT EVENTS:   Patient was examined while  was lying in bed, Aox3, responding briefly to questions, in NAD, but refusing interventions. He is still retching/vomiting but has normal bowel movements.     REVIEW OF SYSTEMS:  CONSTITUTIONAL: Fatigue + No fever, weight loss  RESPIRATORY: No cough, wheezing, chills or hemoptysis; No shortness of breath  CARDIOVASCULAR: No chest pain, palpitations, dizziness, or leg swelling  GASTROINTESTINAL: RUQ Abdominal pain +, Nausea, vomiting, retching+  No hematemesis; No diarrhea or constipation. No melena or hematochezia.  GENITOURINARY: No dysuria or hematuria, urinary frequency  NEUROLOGICAL: No headaches, memory loss, loss of strength, numbness, or tremors  SKIN: No itching, burning, rashes, or lesions       MEDICATIONS  (STANDING):  calcium acetate 667 milliGRAM(s) Oral three times a day with meals  cloNIDine Patch 0.1 mG/24Hr(s) 1 patch Transdermal <User Schedule>  epoetin beverley-epbx (RETACRIT) Injectable 4000 Unit(s) IV Push <User Schedule>  folic acid 1 milliGRAM(s) Oral daily  heparin   Injectable 5000 Unit(s) SubCutaneous every 8 hours  hydrALAZINE 50 milliGRAM(s) Oral every 8 hours  insulin lispro (ADMELOG) corrective regimen sliding scale   SubCutaneous Before meals and at bedtime  metoclopramide 5 milliGRAM(s) Oral three times a day  metoprolol tartrate 12.5 milliGRAM(s) Oral two times a day  nortriptyline 10 milliGRAM(s) Oral daily  pantoprazole  Injectable 40 milliGRAM(s) IV Push daily  sevelamer carbonate 1600 milliGRAM(s) Oral three times a day with meals  simvastatin 40 milliGRAM(s) Oral at bedtime    MEDICATIONS  (PRN):  acetaminophen   Tablet .. 650 milliGRAM(s) Oral every 6 hours PRN Temp greater or equal to 38C (100.4F), Moderate Pain (4 - 6)  guaiFENesin   Syrup  (Sugar-Free) 100 milliGRAM(s) Oral every 6 hours PRN Cough  hydrALAZINE Injectable 5 milliGRAM(s) IV Push every 8 hours PRN SBP >160  ondansetron Injectable 4 milliGRAM(s) IV Push every 6 hours PRN Nausea and/or Vomiting  oxycodone    5 mG/acetaminophen 325 mG 1 Tablet(s) Oral every 6 hours PRN Severe Pain (7 - 10)      Vital Signs Last 24 Hrs  T(C): 36.6 (31 Dec 2020 00:08), Max: 36.8 (30 Dec 2020 16:49)  T(F): 97.8 (31 Dec 2020 00:08), Max: 98.2 (30 Dec 2020 16:49)  HR: 101 (31 Dec 2020 05:58) (101 - 113)  BP: 155/94 (31 Dec 2020 05:58) (155/94 - 186/96)  BP(mean): --  RR: 18 (31 Dec 2020 00:08) (17 - 18)  SpO2: 96% (31 Dec 2020 00:08) (96% - 96%)    PHYSICAL EXAMINATION:  GENERAL: NAD, thinly built AAM, vomiting lying in bed   HEAD:  Atraumatic, Normocephalic  EYES:  conjunctiva and sclera clear  NECK: Supple, No JVD  CHEST/LUNG: Clear to auscultation.  No rales, rhonchi, wheezing, or rubs  HEART: Regular rate and rhythm; No murmurs, rubs, or gallops  ABDOMEN: Soft, Nontender, Nondistended; Bowel sounds increased   NERVOUS SYSTEM:  Alert & Oriented X3, No motor or sensory deficits   EXTREMITIES: R AVF in place 2+ Peripheral Pulses, No clubbing, cyanosis, or edema  SKIN: warm dry                          11.7   7.64  )-----------( 157      ( 30 Dec 2020 18:27 )             37.4     12-30    138  |  92<L>  |  67<H>  ----------------------------<  189<H>  4.4   |  25  |  19.10<H>    Ca    7.6<L>      30 Dec 2020 18:27  Phos  7.0     12-29  Mg     2.5     12-29    TPro  8.1  /  Alb  3.7  /  TBili  0.7  /  DBili  x   /  AST  4<L>  /  ALT  11  /  AlkPhos  143<H>  12-29    LIVER FUNCTIONS - ( 29 Dec 2020 12:20 )  Alb: 3.7 g/dL / Pro: 8.1 g/dL / ALK PHOS: 143 U/L / ALT: 11 U/L DA / AST: 4 U/L / GGT: x                   CAPILLARY BLOOD GLUCOSE      RADIOLOGY & ADDITIONAL TESTS:                   PGY-1 Progress Note discussed with attending    PAGER #: [[phone]] TILL 5:00 PM  PLEASE CONTACT ON CALL TEAM:  - On Call Team (Please refer to Dejon) FROM 5:00 PM - 8:30PM  - Nightfloat Team FROM 8:30 -7:30 AM    CHIEF COMPLAINT & BRIEF HOSPITAL COURSE:  [facility] 60[facility] of [facility]D on HD TTS, HTN, DM, partially blind and s/p left BKA, who presented to the ED due to vomiting. Patient states that he was feeling fine until today when he started to have nausea and multiple episodes of vomiting. Patient unable to state exactly how many episodes he had. Denies any signs of blood as far as he knows. Patient also had some abdominal pain mainly located on RLQ but states it is improved now. Patient states that he has had similar episodes previously and has gone to hospital but unsure exactly about what is the cause of symptoms. Patient states he normally has HD sessions on T/T/S but it was changed due to the holidays, and he missed another session due to feeling sick. Currently, patient denies any chest pain, shortness of breath, headache, dizziness or abdominal pain. He was admitted for management of intractable vomiting which is likely 2/2 to diabetic gastroparesis vs electrolyte derangements. He was mildly improved on Reglan, and was not able to tolerate the entire session of dialysis 12/28. After this, he refused AM meds and blood draws, and his BP remained elevated due to rebound HTN. Clonidine PO was made patch and labetalol 5mg push given with hydralazine PRN pushes. Pt keeps refusing labs, and is unable to finish a full session of dialysis. His brother and HCP (Georgi newman) was contacted and reaffirmed [facility] as full code status.     INTERVAL HPI/OVERNIGHT EVENTS:   Patient was examined while  was lying in bed, Aox3, responding briefly to questions, in NAD, but refusing interventions. He is still retching/vomiting but has normal bowel movements.     REVIEW OF SYSTEMS:  CONSTITUTIONAL: Fatigue + No fever, weight loss  RESPIRATORY: No cough, wheezing, chills or hemoptysis; No shortness of breath  CARDIOVASCULAR: No chest pain, palpitations, dizziness, or leg swelling  GASTROINTESTINAL: RUQ Abdominal pain +, Nausea, vomiting, retching+  No hematemesis; No diarrhea or constipation. No melena or hematochezia.  GENITOURINARY: No dysuria or hematuria, urinary frequency  NEUROLOGICAL: No headaches, memory loss, loss of strength, numbness, or tremors  SKIN: No itching, burning, rashes, or lesions       MEDICATIONS  (STANDING):  calcium acetate 667 milliGRAM(s) Oral three times a day with meals  cloNIDine Patch 0.1 mG/24Hr(s) 1 patch Transdermal <User Schedule>  epoetin beverley-epbx (RETACRIT) Injectable 4000 Unit(s) IV Push <User Schedule>  folic acid 1 milliGRAM(s) Oral daily  heparin   Injectable 5000 Unit(s) SubCutaneous every 8 hours  hydrALAZINE 50 milliGRAM(s) Oral every 8 hours  insulin lispro (ADMELOG) corrective regimen sliding scale   SubCutaneous Before meals and at bedtime  metoclopramide 5 milliGRAM(s) Oral three times a day  metoprolol tartrate 12.5 milliGRAM(s) Oral two times a day  nortriptyline 10 milliGRAM(s) Oral daily  pantoprazole  Injectable 40 milliGRAM(s) IV Push daily  sevelamer carbonate 1600 milliGRAM(s) Oral three times a day with meals  simvastatin 40 milliGRAM(s) Oral at bedtime    MEDICATIONS  (PRN):  acetaminophen   Tablet .. 650 milliGRAM(s) Oral every 6 hours PRN Temp greater or equal to 38C (100.4F), Moderate Pain (4 - 6)  guaiFENesin   Syrup  (Sugar-Free) 100 milliGRAM(s) Oral every 6 hours PRN Cough  hydrALAZINE Injectable 5 milliGRAM(s) IV Push every 8 hours PRN SBP >160  ondansetron Injectable 4 milliGRAM(s) IV Push every 6 hours PRN Nausea and/or Vomiting  oxycodone    5 mG/acetaminophen 325 mG 1 Tablet(s) Oral every 6 hours PRN Severe Pain (7 - 10)      Vital Signs Last 24 Hrs  T(C): 36.6 (31 Dec 2020 00:08), Max: 36.8 (30 Dec 2020 16:49)  T(F): 97.8 (31 Dec 2020 00:08), Max: 98.2 (30 Dec 2020 16:49)  HR: 101 (31 Dec 2020 05:58) (101 - 113)  BP: 155/94 (31 Dec 2020 05:58) (155/94 - 186/96)  RR: 18 (31 Dec 2020 00:08) (17 - 18)  SpO2: 96% (31 Dec 2020 00:08) (96% - 96%)    PHYSICAL EXAMINATION:  GENERAL: NAD, thinly built AAM, vomiting lying in bed   HEAD:  Atraumatic, Normocephalic  EYES:  conjunctiva and sclera clear  NECK: Supple, No JVD  CHEST/LUNG: Clear to auscultation.  No rales, rhonchi, wheezing, or rubs  HEART: Regular rate and rhythm; No murmurs, rubs, or gallops  ABDOMEN: Soft, Nontender, Nondistended; Bowel sounds increased   NERVOUS SYSTEM:  Alert & Oriented X3, No motor or sensory deficits   EXTREMITIES: R AVF in place 2+ Peripheral Pulses, No clubbing, cyanosis, or edema  SKIN: warm dry                          11.7   7.64  )-----------( 157      ( 30 Dec 2020 18:27 )             37.4     12-30    138  |  92<L>  |  67<H>  ----------------------------<  189<H>  4.4   |  25  |  19.10<H>    Ca    7.6<L>      30 Dec 2020 18:27  Phos  7.0     12-29  Mg     2.5     12-29    TPro  8.1  /  Alb  3.7  /  TBili  0.7  /  DBili  x   /  AST  4<L>  /  ALT  11  /  AlkPhos  143<H>  12-29    LIVER FUNCTIONS - ( 29 Dec 2020 12:20 )  Alb: 3.7 g/dL / Pro: 8.1 g/dL / ALK PHOS: 143 U/L / ALT: 11 U/L DA / AST: 4 U/L / GGT: x        PGY-1 Progress Note discussed with attending    PAGER #: [[phone]] TILL 5:00 PM  PLEASE CONTACT ON CALL TEAM:  - On Call Team (Please refer to Dejon) FROM 5:00 PM - 8:30PM  - Nightfloat Team FROM 8:30 -7:30 AM    CHIEF COMPLAINT & BRIEF HOSPITAL COURSE:  [facility] 60[facility] of [facility]D on HD TTS, HTN, DM, partially blind and s/p left BKA, who presented to the ED due to vomiting. Patient states that he was feeling fine until today when he started to have nausea and multiple episodes of vomiting. Patient unable to state exactly how many episodes he had. Denies any signs of blood as far as he knows. Patient also had some abdominal pain mainly located on RLQ but states it is improved now. Patient states that he has had similar episodes previously and has gone to hospital but unsure exactly about what is the cause of symptoms. Patient states he normally has HD sessions on T/T/S but it was changed due to the holidays, and he missed another session due to feeling sick. Currently, patient denies any chest pain, shortness of breath, headache, dizziness or abdominal pain. He was admitted for management of intractable vomiting which is likely 2/2 to diabetic gastroparesis vs electrolyte derangements. He was mildly improved on Reglan, and was not able to tolerate the entire session of dialysis 12/28. After this, he refused AM meds and blood draws, and his BP remained elevated due to rebound HTN. Clonidine PO was made patch and labetalol 5mg push given with hydralazine PRN pushes. Pt keeps refusing labs, and is unable to finish a full session of dialysis. His brother and HCP (Georgi newman) was contacted and reaffirmed [facility] as full code status.     INTERVAL HPI/OVERNIGHT EVENTS:   Patient was examined while  was lying in bed, Aox3, responding briefly to questions, in NAD, but refusing interventions. He is still retching/vomiting but has normal bowel movements.     REVIEW OF SYSTEMS:  CONSTITUTIONAL: Fatigue + No fever, weight loss  RESPIRATORY: No cough, wheezing, chills or hemoptysis; No shortness of breath  CARDIOVASCULAR: No chest pain, palpitations, dizziness, or leg swelling  GASTROINTESTINAL: RUQ Abdominal pain +, Nausea, vomiting, retching+  No hematemesis; No diarrhea or constipation. No melena or hematochezia.  GENITOURINARY: No dysuria or hematuria, urinary frequency  NEUROLOGICAL: No headaches, memory loss, loss of strength, numbness, or tremors  SKIN: No itching, burning, rashes, or lesions       MEDICATIONS  (STANDING):  calcium acetate 667 milliGRAM(s) Oral three times a day with meals  cloNIDine Patch 0.1 mG/24Hr(s) 1 patch Transdermal <User Schedule>  epoetin beverley-epbx (RETACRIT) Injectable 4000 Unit(s) IV Push <User Schedule>  folic acid 1 milliGRAM(s) Oral daily  heparin   Injectable 5000 Unit(s) SubCutaneous every 8 hours  hydrALAZINE 50 milliGRAM(s) Oral every 8 hours  insulin lispro (ADMELOG) corrective regimen sliding scale   SubCutaneous Before meals and at bedtime  metoclopramide 5 milliGRAM(s) Oral three times a day  metoprolol tartrate 12.5 milliGRAM(s) Oral two times a day  nortriptyline 10 milliGRAM(s) Oral daily  pantoprazole  Injectable 40 milliGRAM(s) IV Push daily  sevelamer carbonate 1600 milliGRAM(s) Oral three times a day with meals  simvastatin 40 milliGRAM(s) Oral at bedtime    MEDICATIONS  (PRN):  acetaminophen   Tablet .. 650 milliGRAM(s) Oral every 6 hours PRN Temp greater or equal to 38C (100.4F), Moderate Pain (4 - 6)  guaiFENesin   Syrup  (Sugar-Free) 100 milliGRAM(s) Oral every 6 hours PRN Cough  hydrALAZINE Injectable 5 milliGRAM(s) IV Push every 8 hours PRN SBP >160  ondansetron Injectable 4 milliGRAM(s) IV Push every 6 hours PRN Nausea and/or Vomiting  oxycodone    5 mG/acetaminophen 325 mG 1 Tablet(s) Oral every 6 hours PRN Severe Pain (7 - 10)      Vital Signs Last 24 Hrs  T(C): 36.6 (31 Dec 2020 00:08), Max: 36.8 (30 Dec 2020 16:49)  T(F): 97.8 (31 Dec 2020 00:08), Max: 98.2 (30 Dec 2020 16:49)  HR: 101 (31 Dec 2020 05:58) (101 - 113)  BP: 155/94 (31 Dec 2020 05:58) (155/94 - 186/96)  RR: 18 (31 Dec 2020 00:08) (17 - 18)  SpO2: 96% (31 Dec 2020 00:08) (96% - 96%)    PHYSICAL EXAMINATION:  GENERAL: NAD, thinly built AAM, vomiting lying in bed   HEAD:  Atraumatic, Normocephalic  EYES:  conjunctiva and sclera clear  NECK: Supple, No JVD  CHEST/LUNG: Clear to auscultation.  No rales, rhonchi, wheezing, or rubs  HEART: Regular rate and rhythm; No murmurs, rubs, or gallops  ABDOMEN: Soft, Nontender, Nondistended; Bowel sounds increased   NERVOUS SYSTEM:  Alert but confused, Oriented X2, No motor or sensory deficits   EXTREMITIES: R AVF in place 2+ Peripheral Pulses, No clubbing, cyanosis, or edema  SKIN: warm dry                          11.7   7.64  )-----------( 157      ( 30 Dec 2020 18:27 )             37.4     12-30    138  |  92<L>  |  67<H>  ----------------------------<  189<H>  4.4   |  25  |  19.10<H>    Ca    7.6<L>      30 Dec 2020 18:27  Phos  7.0     12-29  Mg     2.5     12-29    TPro  8.1  /  Alb  3.7  /  TBili  0.7  /  DBili  x   /  AST  4<L>  /  ALT  11  /  AlkPhos  143<H>  12-29    LIVER FUNCTIONS - ( 29 Dec 2020 12:20 )  Alb: 3.7 g/dL / Pro: 8.1 g/dL / ALK PHOS: 143 U/L / ALT: 11 U/L DA / AST: 4 U/L / GGT: x

## 2020-12-31 NOTE — PROGRESS NOTE ADULT - PROBLEM SELECTOR PLAN 1
CT A/P showing thickened distal esophagus suggesting esophagitis  patient was scheduled for EGD today but refused  Dr. Bautista aware   ? EGD Monday CT A/P showing thickened distal esophagus suggesting esophagitis  patient was scheduled for EGD today but refused  Dr. Bautista aware   will plan for EGD Monday if patient agreeable CT A/P showing thickened distal esophagus suggesting esophagitis  patient was scheduled for EGD today but refused  Dr. Bautista aware   will plan for EGD Monday if patient agreeable  NPO after MN on [place] 1/3/21

## 2020-12-31 NOTE — PROGRESS NOTE ADULT - ASSESSMENT
#ESRD. HD  today. compliance with HD encouraged.  Psychiatry input noted.  # uncontrolled HTN. Cont clonidine hydralazine clonidine and metoprolol  # renal osteodystrophy: cont phoslo and sevelamer. phos restricted diet  # anemia of CKD cont epogen on HD   # nausea and vomitting. CT scan noted with distal esophagitis. Awaits endoscopy

## 2020-12-31 NOTE — PROGRESS NOTE ADULT - SUBJECTIVE AND OBJECTIVE BOX
GI PROGRESS NOTE    Patient is a 60y old  Male who presents with a chief complaint of vomiting (31 Dec 2020 07:58)      HPI:  Patient is a 60 year old male from [place] [address], with PMH of ESRD on HD TTS, HTN, DM, partially blind and s/p left BKA, who presented to the ED due to vomiting. Patient states that he was feeling fine until today when he started to have nausea and multiple episodes of vomiting. Patient unable to state exactly how many episodes he had. Denies any signs of blood as far as he knows. Patient also had some abdominal pain mainly located on RLQ but states it is improved now. Patient states that he has had similar episodes previously and has gone to hospital but unsure exactly about what is the cause of symptoms. Patient states he normally has HD sessions on T/T/S but due to holidays, schedule has been slightly changed and had last session on Friday. Patient states he was supposed to have HD today but did not go due to feeling sick. Currently, patient denies any chest pain, shortness of breath, headache, dizziness or abdominal pain.  (27 Dec 2020 20:37)          ______________________________________________________________________  PMH/PSH:  PAST MEDICAL & SURGICAL HISTORY:  ESRD on hemodialysis    Vision loss of left eye    Osteoporosis    Osteoarthritis    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    Diabetic neuropathy    Diabetes mellitus    Hyperparathyroidism    Spinal stenosis of lumbosacral region    Peripheral vascular disease    HLD (hyperlipidemia)    Coronary artery disease    Glaucoma    Anemia    CKD (chronic kidney disease)    Adrenal insufficiency    Hypertension    S/P arteriovenous (AV) fistula creation  2018    History of left cataract extraction    History of right cataract extraction    Below knee amputation status, left      ______________________________________________________________________  MEDS:  MEDICATIONS  (STANDING):  calcium acetate 667 milliGRAM(s) Oral three times a day with meals  cloNIDine Patch 0.1 mG/24Hr(s) 1 patch Transdermal <User Schedule>  epoetin beverley-epbx (RETACRIT) Injectable 4000 Unit(s) IV Push <User Schedule>  folic acid 1 milliGRAM(s) Oral daily  heparin   Injectable 5000 Unit(s) SubCutaneous every 8 hours  hydrALAZINE 50 milliGRAM(s) Oral every 8 hours  insulin lispro (ADMELOG) corrective regimen sliding scale   SubCutaneous Before meals and at bedtime  LORazepam   Injectable 1 milliGRAM(s) IntraMuscular once  metoclopramide 5 milliGRAM(s) Oral three times a day  metoprolol tartrate 12.5 milliGRAM(s) Oral two times a day  nortriptyline 10 milliGRAM(s) Oral daily  pantoprazole  Injectable 40 milliGRAM(s) IV Push daily  sevelamer carbonate 1600 milliGRAM(s) Oral three times a day with meals  simvastatin 40 milliGRAM(s) Oral at bedtime    MEDICATIONS  (PRN):  acetaminophen   Tablet .. 650 milliGRAM(s) Oral every 6 hours PRN Temp greater or equal to 38C (100.4F), Moderate Pain (4 - 6)  guaiFENesin   Syrup  (Sugar-Free) 100 milliGRAM(s) Oral every 6 hours PRN Cough  hydrALAZINE Injectable 5 milliGRAM(s) IV Push every 8 hours PRN SBP >160  ondansetron Injectable 4 milliGRAM(s) IV Push every 6 hours PRN Nausea and/or Vomiting  oxycodone    5 mG/acetaminophen 325 mG 1 Tablet(s) Oral every 6 hours PRN Severe Pain (7 - 10)    ______________________________________________________________________  ALL:   Allergies    fish (Rash)  liver (Anaphylaxis)  No Known Drug Allergies    Intolerances      ______________________________________________________________________  SH: Social History:  Patient is from [facility]. Denies any smoking, alcohol use or use of illicit drugs. (27 Dec 2020 20:37)    ______________________________________________________________________  FH:  FAMILY HISTORY:    ______________________________________________________________________  ROS:    CONSTITUTIONAL:  No weight loss, fever, chills, weakness or fatigue.    HEENT:  Eyes:  No visual loss, blurred vision, double vision or yellow sclerae. Ears, Nose, Throat:  No hearing loss, sneezing, congestion, runny nose or sore throat.    SKIN:  No rash or itching.    CARDIOVASCULAR:  No chest pain, chest pressure or chest discomfort. No palpitations or edema.    RESPIRATORY:  No shortness of breath, cough or sputum.    GASTROINTESTINAL:  SEE HPI    GENITOURINARY:  No dysuria, hematuria, urinary frequency    NEUROLOGICAL:  No headache, dizziness, syncope, paralysis, ataxia, numbness or tingling in the extremities. No change in bowel or bladder control.    MUSCULOSKELETAL:  No muscle, back pain, joint pain or stiffness.    HEMATOLOGIC:  No anemia, bleeding or bruising.    LYMPHATICS:  No enlarged nodes. No history of splenectomy.    PSYCHIATRIC:  No history of depression or anxiety.    ENDOCRINOLOGIC:  No reports of sweating, cold or heat intolerance. No polyuria or polydipsia.    ALLERGIES:  No history of asthma, hives, eczema or rhinitis.  ______________________________________________________________________  PHYSICAL EXAM:  T(C): 37.2 (12-31-20 @ 08:54), Max: 37.2 (12-31-20 @ 08:54)  HR: 99 (12-31-20 @ 08:54)  BP: 165/95 (12-31-20 @ 08:54)  RR: 18 (12-31-20 @ 08:54)  SpO2: 97% (12-31-20 @ 08:33)  Wt(kg): --      GEN: NAD   CVS- S1 S2  ABD: soft nontender, non distended, bowel sounds+  LUNGS: clear to auscultation  NEURO: non focal neuro exam;  Extremities: no cyanosis, no calf tenderness, no edema, no clubbing      ______________________________________________________________________  LABS:                        11.4   6.30  )-----------( 143      ( 31 Dec 2020 09:06 )             36.7     12-31    141  |  95<L>  |  78<H>  ----------------------------<  110<H>  4.3   |  27  |  20.50<H>    Ca    7.5<L>      31 Dec 2020 09:29  Phos  8.2     12-31  Mg     2.5     12-31

## 2020-12-31 NOTE — PROGRESS NOTE ADULT - CONVERSATION DETAILS
Emergency Contact Georgi King was unable to be reached and mailbox was full (unable to leave a callback message).  Son Max Foster was reached and stated that he and another brother reside out of state ([place]) and no other family lives in [facility].   He was updated on patient's continued refusal of treatment options including dialysis, IV lines, oral medications, and prognosis of untreated renal failure leading to fluid overload and possibly cardiac arrest.  Risks and benefits of CPR and mechanical ventilation were discussed and questions answered. \  He would like to continue the patient on FULL CODE STATUS, saying "try your best to save him".  Call back number for the unit provided, patient regrets that he nor other family cannot visit patient in the near future. Emergency Contact Georgi King was reached.  He stated that pt's son Max Foster and another brother reside out of state ([place] and [place]) and no other family lives in [facility].   He was updated on patient's continued refusal of treatment options including dialysis, IV lines, oral medications, and prognosis of untreated renal failure leading to fluid overload and possibly cardiac arrest.  Risks and benefits of CPR and mechanical ventilation were discussed and questions answered.   He would like to continue the patient on FULL CODE STATUS, saying "try your best to save him".  Call back number for the unit provided, patient regrets that he nor other family cannot visit patient in the near future.

## 2020-12-31 NOTE — BEHAVIORAL HEALTH ASSESSMENT NOTE - NSBHCONSULTRECOMMENDOTHER_PSY_A_CORE FT
1. Pt does NOT demonstrate capacity to refuse HD and EGD, but does express assent with recommended care. Brother/HCP has been contacted and has consented on pt's behalf  2. Pt should be treated with brother's consent due to urgency of his medical condition, which has potential to cause death within 24h  3. If needed to increase comfort for procedures, recommend Ativan 1 mg IV once prior to procedure (can repeat dose if initial 1 mg is not effective)  4. Psychiatry is signing off. Reconsult if additional issues arise as inpatient  5. Brother Georgi King ([phone]) requests primary team call him from pt's room so he can encourage pt to cooperate with care  6. Case d/w primary resident Dr. Guerrero and attending Dr. Michele Phelan MD  Director, Consultation-Liaison Psychiatry Service  [id number]

## 2020-12-31 NOTE — BEHAVIORAL HEALTH ASSESSMENT NOTE - NSBHCONSULTFOLLOWAFTERCARE_PSY_A_CORE FT
mg Intravenous BID Den Carey MD   40 mg at 12/03/20 0813    And    sodium chloride (PF) 0.9 % injection 10 mL  10 mL Intravenous BID Den Carey MD   10 mL at 12/03/20 0813       Allergies   Allergen Reactions    Fluticasone-Salmeterol      Causes eye blindness in patient       Surgical History  Past Surgical History:   Procedure Laterality Date    ABDOMINAL HERNIA REPAIR  8/5/13    CHOLECYSTECTOMY      COLON SURGERY      partial resection    INSERT PICC LINE  11/10/2020         OVARY REMOVAL      bilateral        Social History  Social History     Socioeconomic History    Marital status:    Tobacco Use    Smoking status: Never Smoker    Smokeless tobacco: Never Used   Substance and Sexual Activity    Alcohol use: No    Drug use: No     Family Medical History  History reviewed. No pertinent family history.     Review of Systems:  Constitutional: No fever, no chills  Eyes: No vision changes, no retroorbital pain  ENT: No hearing changes, no ear pain  Respiratory: No cough, no dyspnea  Cardiovascular: No chest pain, no palpitations  Gastrointestinal: No abdominal pain, no diarrhea  Genitourinary: No dysuria, no hematuria  Integumentary: No rash, no itching  Musculoskeletal: No muscle pain, no joint pain  Neurologic: No headache, no numbness in extremities    Physical Examination:  Vitals:    12/03/20 0004 12/03/20 0752 12/03/20 0922 12/03/20 1515   BP: 136/64 (!) 147/60  (!) 124/59   Pulse: 86 85  85   Resp: 18 19  18   Temp: 97.5 °F (36.4 °C) 99.2 °F (37.3 °C)  98.4 °F (36.9 °C)   TempSrc: Temporal Temporal  Temporal   SpO2: 99% 98%  98%   Weight: 191 lb 9.6 oz (86.9 kg)      Height: 5' 5\" (1.651 m)  5' 5\" (1.651 m)      Constitutional: Alert, not in distress  Eyes: Sclerae anicteric, no conjunctival erythema  ENT: No buccal lesion, no pharyngeal exudates  Neck: No nuchal rigidity, no cervical adenopathy  Lungs: Clear breath sounds, no crackles, no wheezes  Heart: Regular rate and rhythm, no murmurs  Abdomen: Bowel sounds present, soft, nontender  Skin: Warm and dry, no active dermatoses  Musculoskeletal: No joint erythema, no joint swelling    Labs, imaging, and medical records/notes were personally reviewed. Assessment:  Elevated procalcitonin level, significance unclear. There is no apparent source of infection and no apparent signs of sepsis for now. Recent MSSA bacteremia. Finished 4-week of cefazolin 11/05-11/25. GI bleeding  Pulmonary embolism  COVID-19 (tested positive on 11/24)  Asymptomatic bacteriuria    Plan:  Monitor clinically off antibiotics for now. Check blood cultures. Remove Smith catheter and use external catheter system instead if needed. Monitor respiratory status. Wean off oxygen as tolerated. Continue supportive care. Thank you for involving me in the care of [facility]. I will continue to follow. Please do not hesitate to call for any questions or concerns.     Electronically signed by Shiraz Eason MD on 12/3/2020 at 4:34 PM See above

## 2020-12-31 NOTE — PROGRESS NOTE ADULT - ATTENDING COMMENTS
MEDICAL ATTENDING COVERING DR. De La Torre    Patient seen and examined this morning. Was pending EGD today, however was cancelled as patient was confused overnight, now refusing dialysis. Psychiatry seen and evaluated at bedside with me, determined patient does not have capacity and followed Ativan IM injection recommendation per psychiatry.  In brief, patient is a 60 M  from [facility] with PMH of ESRD on HD TTS, HTN, DM, partially blind and s/p left BKA brought to ED due to vomiting and RLQ abdominal pain found to be in metabolic derangement. has normal wbc with neutrophilia. CT A/P showin gdistal esophagitis, however patient unable to go for scheduled EGD today given refusal of dialysis (has subsequently been consented/assented by HCP brother via telephone as detailed in GO discussion note above). Blood cultures negative to date. EGD to be rescheduled for possibly Monday 01/4/2021.    A/P  Intractable nausea, vomiting, suspect upper GI bleeding, distal esophagitis noted on CT abdomen  Anion gap metabolic acidosis secondary to ESRD, Fs within normal appears to have tight glycemic control  Hypertensive urgency secondary to missed HD and inability to tolerate PO medications, IV BP meds ordered  ESRD on HD  Hyperphosphatemia  Hyperkalemia - resolved  Anemia secondary to ESRD  T2DM with A1c 5.9  s/p Left BKA    Plan  HD per Nephrology - patient currently refusing  clonidine patch added per Nephrology recommendation, IV hydralazine 5mg PRN  CXR negative, blood cultures testing, holding on antibiotics  GI consultation given ongoing nausea, emesis, coffee-ground and CT findings of distal esophagitis, continue to trend CBC at least daily in case of upper GI bleeding, twice daily if patient allows; PPI daily per GI, EGD postponed given delay in dialysis and endoscopy suite team gone until Monday 01/04/2020.    Rest of plan as outlined above.

## 2020-12-31 NOTE — BEHAVIORAL HEALTH ASSESSMENT NOTE - SUMMARY
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 Psych consult was requested for capacity eval due to pt's refusal of HD and EGD, which was recommended for w/u of RLQ pain. On exam, pt presents drowsy and describes himself as "confused," c/w acute delirium 2/2 medical condition (acute on chronic renal failure). Pt does NOT appear to have capacity to refuse HD or EGD at this time, which he seems to acknowledge by describing himself as confused, but he does express assent with the care which has been recommended and denies SI. Pt has HCP (brother Georgi King) who has been contacted and strongly agrees with all recommended care, so pt should receive treatment promptly with brother's consent. Pt does not appear to present an acute risk of harm to self or others at the time of assessment, and does not appear to be in need of admission to IP psych at the time of assessment.

## 2020-12-31 NOTE — PROGRESS NOTE ADULT - ASSESSMENT
60 year old male from [facility], with PMH of ESRD on HD TTS, HTN, DM, partially blind and s/p left BKA, who presented with vomiting, and was admitted for dialysis and work-up for nausea and vomiting. Patient was agreeable to EGD but is now refusing. Attending aware

## 2020-12-31 NOTE — PROGRESS NOTE ADULT - SUBJECTIVE AND OBJECTIVE BOX
[facility] : Progress Note :: [phone], (office [phone]),   Dr Mosher / Dr Washington / Dr Young / Dr Doll / Dr Varsha TURCIOS / Dr Tello / Dr Garcia / Dr Larry qiu  _____________________________________________________________________________________________    Patient had declined HD earlier.  seen by psychiatry- agreed to cont with HD .    fish (Rash)  liver (Anaphylaxis)  No Known Drug Allergies    Hospital Medications:   MEDICATIONS  (STANDING):  calcium acetate 667 milliGRAM(s) Oral three times a day with meals  cloNIDine Patch 0.1 mG/24Hr(s) 1 patch Transdermal <User Schedule>  epoetin beverley-epbx (RETACRIT) Injectable 4000 Unit(s) IV Push <User Schedule>  folic acid 1 milliGRAM(s) Oral daily  heparin   Injectable 5000 Unit(s) SubCutaneous every 8 hours  hydrALAZINE 50 milliGRAM(s) Oral every 8 hours  insulin lispro (ADMELOG) corrective regimen sliding scale   SubCutaneous Before meals and at bedtime  metoclopramide 5 milliGRAM(s) Oral three times a day  metoprolol tartrate 12.5 milliGRAM(s) Oral two times a day  nortriptyline 10 milliGRAM(s) Oral daily  pantoprazole  Injectable 40 milliGRAM(s) IV Push daily  sevelamer carbonate 1600 milliGRAM(s) Oral three times a day with meals  simvastatin 40 milliGRAM(s) Oral at bedtime        VITALS:  T(F): 98.6 (12-31-20 @ 18:26), Max: 99 (12-31-20 @ 08:54)  HR: 105 (12-31-20 @ 18:26)  BP: 103/74 (12-31-20 @ 18:26)  RR: 17 (12-31-20 @ 18:26)  SpO2: 96% (12-31-20 @ 18:26)  Wt(kg): --    12-31 @ 07:01  -  12-31 @ 20:15  --------------------------------------------------------  IN: 600 mL / OUT: 1545 mL / NET: -945 mL        PHYSICAL EXAM:  Constitutional: NAD  HEENT: anicteric sclera, oropharynx clear.  Neck: No JVD  Respiratory: CTAB, no wheezes, rales or rhonchi  Cardiovascular: S1, S2, RRR  Gastrointestinal: BS+, soft, NT/ND  Extremities: RT BKA. No peripheral edema  Neurological: A/O x 3, no focal deficits  Vascular Access: AVF with thrill and bruit    LABS:  12-31    141  |  95<L>  |  78<H>  ----------------------------<  110<H>  4.3   |  27  |  20.50<H>    Ca    7.5<L>      31 Dec 2020 09:29  Phos  8.2     12-31  Mg     2.5     12-31      Creatinine Trend: 20.50 <--, 19.10 <--, 14.40 <--, 19.90 <--, 18.10 <--, 19.80 <--                        11.4   6.30  )-----------( 143      ( 31 Dec 2020 09:06 )             36.7     Urine Studies:      RADIOLOGY & ADDITIONAL STUDIES:

## 2020-12-31 NOTE — BEHAVIORAL HEALTH ASSESSMENT NOTE - NSBHCHARTREVIEWLAB_PSY_A_CORE FT
11.4   6.30  )-----------( 143      ( 31 Dec 2020 09:06 )             36.7   12-31    141  |  95<L>  |  78<H>  ----------------------------<  110<H>  4.3   |  27  |  20.50<H>    Ca    7.5<L>      31 Dec 2020 09:29  Phos  8.2     12-31  Mg     2.5     12-31

## 2020-12-31 NOTE — BEHAVIORAL HEALTH ASSESSMENT NOTE - NSBHCHARTREVIEWVS_PSY_A_CORE FT
Vital Signs Last 24 Hrs  T(C): 37.2 (31 Dec 2020 08:54), Max: 37.2 (31 Dec 2020 08:54)  T(F): 99 (31 Dec 2020 08:54), Max: 99 (31 Dec 2020 08:54)  HR: 99 (31 Dec 2020 08:54) (97 - 113)  BP: 165/95 (31 Dec 2020 08:54) (155/94 - 186/96)  BP(mean): --  RR: 18 (31 Dec 2020 08:54) (18 - 18)  SpO2: 97% (31 Dec 2020 08:33) (96% - 97%)

## 2020-12-31 NOTE — PROGRESS NOTE ADULT - PROBLEM SELECTOR PLAN 1
- Pt BP was 201/99  - S/p metoprolol 12.5mg q12 and resumed his home medication.  - Started on Clonidine PO which pt is not tolerating due to vomiting. Switched to clonidine patch  - Will continue on  hydralazine IV pushes PRN  - Refused dialysis 12/29 , 12/30  - Will monitor BP

## 2020-12-31 NOTE — BEHAVIORAL HEALTH ASSESSMENT NOTE - HPI (INCLUDE ILLNESS QUALITY, SEVERITY, DURATION, TIMING, CONTEXT, MODIFYING FACTORS, ASSOCIATED SIGNS AND SYMPTOMS)
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 Psych consult was requested for capacity eval due to pt's refusal of HD and EGD, which was recommended for w/u of RLQ pain. Pt was seen in his room on 4S, lying in bed awake but drowsy, with primary attending Dr. Bautista at bedside during interview. Pt describes mood as "I'm confused," and endorses feeling "like crap." When psych MD and Dr. Bautista both explain to pt that he needs HD urgently because of critically high Cr level (20.5 a/o AM labs), pt says, "I know, but I want to go later today, not right away." Both MDs stress that HD cannot be postponed, because tomorrow is a holiday followed by a weekend, and pt will not be able to get EGD until Monday 1/4 unless he has HD ASAP. Pt states that he wants care and does not want to die. Pt confirms previous report that his brother Georgi is his HCP. MD calls brother, who responds that he is "100% on board" for pt to receive any recommended care including HD and EGD, and asks primary team to call him from pt's room so he can speak to pt and encourage him to cooperate with his care. MD agrees to convey message to primary team.

## 2020-12-31 NOTE — CHART NOTE - NSCHARTNOTEFT_GEN_A_CORE
Patient agitated and refusing all PO medications. BP elevated 180's systolic.   Refusing IV placement and IV meds. Ordered one dose 2.5 IM Zyprexa and 5 IV lopressor.   Will follow Patient agitated and refusing all PO medications. BP elevated 180's systolic.   Refusing IV placement and IV meds. Ordered one dose 2.5 IM Zyprexa and 5 IV lopressor.     Zyprexa provided some relief until approx 5 am when on call team notified that he is once again confused and climbing out of bed with agitation. Ordered repeat 2.5 IM zyprexa.

## 2020-12-31 NOTE — PROGRESS NOTE ADULT - ASSESSMENT
Patient is a 60 year old male from [place] [address], with PMH of ESRD on HD TTS, HTN, DM, partially blind and s/p left BKA, who presented with vomiting, and was admitted for dialysis and work-up of possible esophagitis vs uremia vs diabetic gastroparesis.     Hgb of 12.6. Potassium of 7.3 with repeat of 5.9. Creatinine of 19.80. COVID negative. CT abdomen showing thickened distal esophagus. Mild diffuse bladder wall thickening. Given NS bolus, LR bolus, lokelma, reglan and zofran in ED.

## 2020-12-31 NOTE — PROGRESS NOTE ADULT - PROBLEM SELECTOR PLAN 6
- UNABLE TO FOLLOW LABS, - pt refuses daily labs   - Patient with Hgb of 10.8   - Likely secondary to ESRD  - On Epogen  - Monitor CBC daily  - Continue home med of folic acid

## 2021-01-01 LAB
ALBUMIN SERPL ELPH-MCNC: 4 G/DL — SIGNIFICANT CHANGE UP (ref 3.5–5)
ALP SERPL-CCNC: 116 U/L — SIGNIFICANT CHANGE UP (ref 40–120)
ALT FLD-CCNC: 16 U/L DA — SIGNIFICANT CHANGE UP (ref 10–60)
ANION GAP SERPL CALC-SCNC: 17 MMOL/L — SIGNIFICANT CHANGE UP (ref 5–17)
AST SERPL-CCNC: 8 U/L — LOW (ref 10–40)
BASOPHILS # BLD AUTO: 0.01 K/UL — SIGNIFICANT CHANGE UP (ref 0–0.2)
BASOPHILS NFR BLD AUTO: 0.2 % — SIGNIFICANT CHANGE UP (ref 0–2)
BILIRUB SERPL-MCNC: 0.8 MG/DL — SIGNIFICANT CHANGE UP (ref 0.2–1.2)
BUN SERPL-MCNC: 39 MG/DL — HIGH (ref 7–18)
CALCIUM SERPL-MCNC: 8.9 MG/DL — SIGNIFICANT CHANGE UP (ref 8.4–10.5)
CHLORIDE SERPL-SCNC: 96 MMOL/L — SIGNIFICANT CHANGE UP (ref 96–108)
CO2 SERPL-SCNC: 26 MMOL/L — SIGNIFICANT CHANGE UP (ref 22–31)
CREAT SERPL-MCNC: 11.6 MG/DL — HIGH (ref 0.5–1.3)
EOSINOPHIL # BLD AUTO: 0.01 K/UL — SIGNIFICANT CHANGE UP (ref 0–0.5)
EOSINOPHIL NFR BLD AUTO: 0.2 % — SIGNIFICANT CHANGE UP (ref 0–6)
GLUCOSE BLDC GLUCOMTR-MCNC: 100 MG/DL — HIGH (ref 70–99)
GLUCOSE BLDC GLUCOMTR-MCNC: 107 MG/DL — HIGH (ref 70–99)
GLUCOSE BLDC GLUCOMTR-MCNC: 108 MG/DL — HIGH (ref 70–99)
GLUCOSE BLDC GLUCOMTR-MCNC: 142 MG/DL — HIGH (ref 70–99)
GLUCOSE BLDC GLUCOMTR-MCNC: 95 MG/DL — SIGNIFICANT CHANGE UP (ref 70–99)
GLUCOSE SERPL-MCNC: 77 MG/DL — SIGNIFICANT CHANGE UP (ref 70–99)
HCT VFR BLD CALC: 42 % — SIGNIFICANT CHANGE UP (ref 39–50)
HGB BLD-MCNC: 13 G/DL — SIGNIFICANT CHANGE UP (ref 13–17)
IMM GRANULOCYTES NFR BLD AUTO: 0.5 % — SIGNIFICANT CHANGE UP (ref 0–1.5)
LYMPHOCYTES # BLD AUTO: 0.6 K/UL — LOW (ref 1–3.3)
LYMPHOCYTES # BLD AUTO: 10.6 % — LOW (ref 13–44)
MAGNESIUM SERPL-MCNC: 2.6 MG/DL — SIGNIFICANT CHANGE UP (ref 1.6–2.6)
MCHC RBC-ENTMCNC: 28.2 PG — SIGNIFICANT CHANGE UP (ref 27–34)
MCHC RBC-ENTMCNC: 31 GM/DL — LOW (ref 32–36)
MCV RBC AUTO: 91.1 FL — SIGNIFICANT CHANGE UP (ref 80–100)
MONOCYTES # BLD AUTO: 0.72 K/UL — SIGNIFICANT CHANGE UP (ref 0–0.9)
MONOCYTES NFR BLD AUTO: 12.7 % — SIGNIFICANT CHANGE UP (ref 2–14)
NEUTROPHILS # BLD AUTO: 4.29 K/UL — SIGNIFICANT CHANGE UP (ref 1.8–7.4)
NEUTROPHILS NFR BLD AUTO: 75.8 % — SIGNIFICANT CHANGE UP (ref 43–77)
NRBC # BLD: 0 /100 WBCS — SIGNIFICANT CHANGE UP (ref 0–0)
PHOSPHATE SERPL-MCNC: 6.4 MG/DL — HIGH (ref 2.5–4.5)
PLATELET # BLD AUTO: 142 K/UL — LOW (ref 150–400)
POTASSIUM SERPL-MCNC: 4 MMOL/L — SIGNIFICANT CHANGE UP (ref 3.5–5.3)
POTASSIUM SERPL-SCNC: 4 MMOL/L — SIGNIFICANT CHANGE UP (ref 3.5–5.3)
PROT SERPL-MCNC: 8.4 G/DL — HIGH (ref 6–8.3)
RBC # BLD: 4.61 M/UL — SIGNIFICANT CHANGE UP (ref 4.2–5.8)
RBC # FLD: 18.9 % — HIGH (ref 10.3–14.5)
SODIUM SERPL-SCNC: 139 MMOL/L — SIGNIFICANT CHANGE UP (ref 135–145)
WBC # BLD: 5.66 K/UL — SIGNIFICANT CHANGE UP (ref 3.8–10.5)
WBC # FLD AUTO: 5.66 K/UL — SIGNIFICANT CHANGE UP (ref 3.8–10.5)

## 2021-01-01 PROCEDURE — 99232 SBSQ HOSP IP/OBS MODERATE 35: CPT | Mod: GC

## 2021-01-01 RX ADMIN — Medication 5 MILLIGRAM(S): at 06:39

## 2021-01-01 RX ADMIN — OXYCODONE AND ACETAMINOPHEN 1 TABLET(S): 5; 325 TABLET ORAL at 21:42

## 2021-01-01 RX ADMIN — Medication 12.5 MILLIGRAM(S): at 06:39

## 2021-01-01 RX ADMIN — Medication 1 PATCH: at 21:32

## 2021-01-01 RX ADMIN — HEPARIN SODIUM 5000 UNIT(S): 5000 INJECTION INTRAVENOUS; SUBCUTANEOUS at 14:11

## 2021-01-01 RX ADMIN — PANTOPRAZOLE SODIUM 40 MILLIGRAM(S): 20 TABLET, DELAYED RELEASE ORAL at 11:49

## 2021-01-01 RX ADMIN — Medication 5 MILLIGRAM(S): at 14:11

## 2021-01-01 RX ADMIN — HEPARIN SODIUM 5000 UNIT(S): 5000 INJECTION INTRAVENOUS; SUBCUTANEOUS at 21:33

## 2021-01-01 RX ADMIN — SIMVASTATIN 40 MILLIGRAM(S): 20 TABLET, FILM COATED ORAL at 21:34

## 2021-01-01 RX ADMIN — Medication 1 MILLIGRAM(S): at 11:49

## 2021-01-01 RX ADMIN — Medication 1 PATCH: at 07:59

## 2021-01-01 RX ADMIN — HEPARIN SODIUM 5000 UNIT(S): 5000 INJECTION INTRAVENOUS; SUBCUTANEOUS at 06:40

## 2021-01-01 RX ADMIN — Medication 50 MILLIGRAM(S): at 14:11

## 2021-01-01 RX ADMIN — OXYCODONE AND ACETAMINOPHEN 1 TABLET(S): 5; 325 TABLET ORAL at 21:41

## 2021-01-01 RX ADMIN — Medication 667 MILLIGRAM(S): at 08:53

## 2021-01-01 RX ADMIN — Medication 667 MILLIGRAM(S): at 16:43

## 2021-01-01 RX ADMIN — Medication 1 PATCH: at 09:00

## 2021-01-01 RX ADMIN — SEVELAMER CARBONATE 1600 MILLIGRAM(S): 2400 POWDER, FOR SUSPENSION ORAL at 16:43

## 2021-01-01 RX ADMIN — Medication 5 MILLIGRAM(S): at 21:34

## 2021-01-01 RX ADMIN — Medication 50 MILLIGRAM(S): at 06:40

## 2021-01-01 RX ADMIN — SEVELAMER CARBONATE 1600 MILLIGRAM(S): 2400 POWDER, FOR SUSPENSION ORAL at 11:49

## 2021-01-01 RX ADMIN — NORTRIPTYLINE HYDROCHLORIDE 10 MILLIGRAM(S): 10 CAPSULE ORAL at 11:49

## 2021-01-01 RX ADMIN — SEVELAMER CARBONATE 1600 MILLIGRAM(S): 2400 POWDER, FOR SUSPENSION ORAL at 08:53

## 2021-01-01 RX ADMIN — Medication 12.5 MILLIGRAM(S): at 16:44

## 2021-01-01 RX ADMIN — Medication 667 MILLIGRAM(S): at 11:49

## 2021-01-01 NOTE — PROGRESS NOTE ADULT - PROBLEM SELECTOR PLAN 1
- Pt BP was 201/99  - S/p metoprolol 12.5mg q12 and resumed his home medication.  - Started on Clonidine PO which pt is not tolerating due to vomiting. Switched to clonidine patch  - Will continue on  hydralazine IV pushes PRN  - Refused dialysis 12/29 , 12/30  - Will monitor BP - Pt BP was 201/99  - S/p metoprolol 12.5mg q12 and resumed his home medication.  - Started on Clonidine PO which pt is not tolerating due to vomiting. Switched to clonidine patch  - Will continue on  hydralazine IV pushes PRN  - Refused dialysis 12/29 , 12/30, got full dialysis 12/31/20  - Will monitor BP

## 2021-01-01 NOTE — PROGRESS NOTE ADULT - PROBLEM SELECTOR PLAN 4
- UNABLE TO FOLLOW LABS, - pt refuses daily labs   - Phosphorus is 6.6  - C/w phoslo and monitor - Phosphorus is 6.6 -> 6.4  - C/w phoslo and monitor

## 2021-01-01 NOTE — PROGRESS NOTE ADULT - PROBLEM SELECTOR PLAN 5
- Patient on HD T/T/S  - Last HD on Thursday and he was supposed to have session on Saturday but did not get to go   - HD12/28 incomplete, refused 12/19  - Nephro Dr. Mosher  - Continue renvela   - Monitor BMP daily   - SW consult - Patient on HD T/T/S last 12/31  - Last HD on Thursday and he was supposed to have session on Saturday but did not get to go   - HD12/28 incomplete, refused 12/19  - Nephro Dr. Mosher  - Continue renvela   - Monitor BMP daily   - SW consult

## 2021-01-01 NOTE — PROGRESS NOTE ADULT - PROBLEM SELECTOR PLAN 2
- High anion gap metabolic acidosis due to ESRD.  - Anion gap of 20  - Normal Bicarb and vbg was normal  - Serum ketones positive - High anion gap metabolic acidosis due to ESRD.  - Anion gap of 20  - Normal Bicarb and VBG was normal  - Serum ketones positive

## 2021-01-01 NOTE — PROGRESS NOTE ADULT - PROBLEM SELECTOR PLAN 7
- Patient on insulin at home novolin 70/30 and SSI   - A1c is 5.9  - C/w SSI - Patient on insulin at home Novolin 70/30 and SSI   - A1c is 5.9  - C/w SSI
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
## 2021-01-01 NOTE — PROGRESS NOTE ADULT - PROBLEM SELECTOR PLAN 3
- UNABLE TO FOLLOW LABS, - pt refuses daily labs   - Potassium of 5.5 and most likely due to ESRD.  - will continue to monitor potassium   - Nephro Dr. Mosher consulted - 1/1: 4.0mg/dl  - Potassium of 5.5 and most likely due to ESRD.  - will continue to monitor potassium   - Nephro Dr. Mosher consulted
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
## 2021-01-01 NOTE — PROGRESS NOTE ADULT - ASSESSMENT
Patient is a 60 year old male from [place] [address], with PMH of ESRD on HD TTS, HTN, DM, partially blind and s/p left BKA, who presented with vomiting, and was admitted for dialysis and work-up of possible esophagitis vs uremia vs diabetic gastroparesis.     Hgb of 12.6. Potassium of 7.3 with repeat of 5.9. Creatinine of 19.80. COVID negative. CT abdomen showing thickened distal esophagus. Mild diffuse bladder wall thickening. Given NS bolus, LR bolus, lokelma, reglan and zofran in ED.

## 2021-01-01 NOTE — PROGRESS NOTE ADULT - PROBLEM SELECTOR PLAN 6
- UNABLE TO FOLLOW LABS, - pt refuses daily labs   - Patient with Hgb of 10.8   - Likely secondary to ESRD  - On Epogen  - Monitor CBC daily  - Continue home med of folic acid - pt refuses daily labs   - Patient with Hgb of 10.8  -> 13.0  - Likely secondary to ESRD  - On Epogen  - Monitor CBC daily  - Continue home med of folic acid

## 2021-01-02 LAB
ALBUMIN SERPL ELPH-MCNC: 3.4 G/DL — LOW (ref 3.5–5)
ALP SERPL-CCNC: 97 U/L — SIGNIFICANT CHANGE UP (ref 40–120)
ALT FLD-CCNC: 14 U/L DA — SIGNIFICANT CHANGE UP (ref 10–60)
ANION GAP SERPL CALC-SCNC: 19 MMOL/L — HIGH (ref 5–17)
AST SERPL-CCNC: 6 U/L — LOW (ref 10–40)
BASOPHILS # BLD AUTO: 0.01 K/UL — SIGNIFICANT CHANGE UP (ref 0–0.2)
BASOPHILS NFR BLD AUTO: 0.2 % — SIGNIFICANT CHANGE UP (ref 0–2)
BILIRUB SERPL-MCNC: 0.6 MG/DL — SIGNIFICANT CHANGE UP (ref 0.2–1.2)
BUN SERPL-MCNC: 62 MG/DL — HIGH (ref 7–18)
CALCIUM SERPL-MCNC: 7.6 MG/DL — LOW (ref 8.4–10.5)
CHLORIDE SERPL-SCNC: 92 MMOL/L — LOW (ref 96–108)
CO2 SERPL-SCNC: 24 MMOL/L — SIGNIFICANT CHANGE UP (ref 22–31)
CREAT SERPL-MCNC: 15.1 MG/DL — HIGH (ref 0.5–1.3)
EOSINOPHIL # BLD AUTO: 0.05 K/UL — SIGNIFICANT CHANGE UP (ref 0–0.5)
EOSINOPHIL NFR BLD AUTO: 0.9 % — SIGNIFICANT CHANGE UP (ref 0–6)
GLUCOSE BLDC GLUCOMTR-MCNC: 103 MG/DL — HIGH (ref 70–99)
GLUCOSE BLDC GLUCOMTR-MCNC: 66 MG/DL — LOW (ref 70–99)
GLUCOSE BLDC GLUCOMTR-MCNC: 68 MG/DL — LOW (ref 70–99)
GLUCOSE BLDC GLUCOMTR-MCNC: 87 MG/DL — SIGNIFICANT CHANGE UP (ref 70–99)
GLUCOSE BLDC GLUCOMTR-MCNC: 88 MG/DL — SIGNIFICANT CHANGE UP (ref 70–99)
GLUCOSE SERPL-MCNC: 93 MG/DL — SIGNIFICANT CHANGE UP (ref 70–99)
HCT VFR BLD CALC: 38.6 % — LOW (ref 39–50)
HGB BLD-MCNC: 12 G/DL — LOW (ref 13–17)
IMM GRANULOCYTES NFR BLD AUTO: 0.4 % — SIGNIFICANT CHANGE UP (ref 0–1.5)
LYMPHOCYTES # BLD AUTO: 0.58 K/UL — LOW (ref 1–3.3)
LYMPHOCYTES # BLD AUTO: 10.5 % — LOW (ref 13–44)
MAGNESIUM SERPL-MCNC: 2.5 MG/DL — SIGNIFICANT CHANGE UP (ref 1.6–2.6)
MCHC RBC-ENTMCNC: 28.4 PG — SIGNIFICANT CHANGE UP (ref 27–34)
MCHC RBC-ENTMCNC: 31.1 GM/DL — LOW (ref 32–36)
MCV RBC AUTO: 91.3 FL — SIGNIFICANT CHANGE UP (ref 80–100)
MONOCYTES # BLD AUTO: 0.58 K/UL — SIGNIFICANT CHANGE UP (ref 0–0.9)
MONOCYTES NFR BLD AUTO: 10.5 % — SIGNIFICANT CHANGE UP (ref 2–14)
NEUTROPHILS # BLD AUTO: 4.26 K/UL — SIGNIFICANT CHANGE UP (ref 1.8–7.4)
NEUTROPHILS NFR BLD AUTO: 77.5 % — HIGH (ref 43–77)
NRBC # BLD: 0 /100 WBCS — SIGNIFICANT CHANGE UP (ref 0–0)
PHOSPHATE SERPL-MCNC: 7.9 MG/DL — HIGH (ref 2.5–4.5)
PLATELET # BLD AUTO: 142 K/UL — LOW (ref 150–400)
POTASSIUM SERPL-MCNC: 4.2 MMOL/L — SIGNIFICANT CHANGE UP (ref 3.5–5.3)
POTASSIUM SERPL-SCNC: 4.2 MMOL/L — SIGNIFICANT CHANGE UP (ref 3.5–5.3)
PROT SERPL-MCNC: 7.3 G/DL — SIGNIFICANT CHANGE UP (ref 6–8.3)
RBC # BLD: 4.23 M/UL — SIGNIFICANT CHANGE UP (ref 4.2–5.8)
RBC # FLD: 18.9 % — HIGH (ref 10.3–14.5)
SODIUM SERPL-SCNC: 135 MMOL/L — SIGNIFICANT CHANGE UP (ref 135–145)
WBC # BLD: 5.5 K/UL — SIGNIFICANT CHANGE UP (ref 3.8–10.5)
WBC # FLD AUTO: 5.5 K/UL — SIGNIFICANT CHANGE UP (ref 3.8–10.5)

## 2021-01-02 PROCEDURE — 99232 SBSQ HOSP IP/OBS MODERATE 35: CPT

## 2021-01-02 RX ADMIN — Medication 50 MILLIGRAM(S): at 06:23

## 2021-01-02 RX ADMIN — Medication 12.5 MILLIGRAM(S): at 06:23

## 2021-01-02 RX ADMIN — Medication 667 MILLIGRAM(S): at 08:09

## 2021-01-02 RX ADMIN — Medication 5 MILLIGRAM(S): at 13:38

## 2021-01-02 RX ADMIN — SEVELAMER CARBONATE 1600 MILLIGRAM(S): 2400 POWDER, FOR SUSPENSION ORAL at 12:44

## 2021-01-02 RX ADMIN — Medication 5 MILLIGRAM(S): at 06:22

## 2021-01-02 RX ADMIN — Medication 667 MILLIGRAM(S): at 12:44

## 2021-01-02 RX ADMIN — Medication 1 PATCH: at 07:43

## 2021-01-02 RX ADMIN — HEPARIN SODIUM 5000 UNIT(S): 5000 INJECTION INTRAVENOUS; SUBCUTANEOUS at 06:22

## 2021-01-02 RX ADMIN — Medication 1 MILLIGRAM(S): at 12:44

## 2021-01-02 RX ADMIN — SEVELAMER CARBONATE 1600 MILLIGRAM(S): 2400 POWDER, FOR SUSPENSION ORAL at 08:09

## 2021-01-02 RX ADMIN — NORTRIPTYLINE HYDROCHLORIDE 10 MILLIGRAM(S): 10 CAPSULE ORAL at 12:44

## 2021-01-02 NOTE — PROGRESS NOTE ADULT - ASSESSMENT
A/P  ESRD ON  HD  WAS  DIALYZED  TODAY    TOLERATED IT  WELL    BP  WAS  LOW EARLIER   LAST  CHECK  WAS  BETTER  /66,  WILL  D/ C CLONIDINE  PATCH  FOR NOW    CAME IN  WITH  HTN  EMERGENCY  AND HIGH K  AFTER MISSING  HD    OVER  HERE  AS  WELL  HAS  REFUSED  HD  ,  THOUGH  DID  GET IT   TODAY  LOW  CA  AND HIGH  PO4,   CONT  WITH  CA  ACETATE WITH EACH  MEAL  WANTS  TO  GO   BACK   TO  HIS  RESIDENCE ON  MONDAY!!

## 2021-01-02 NOTE — PROGRESS NOTE ADULT - SUBJECTIVE AND OBJECTIVE BOX
Patient is a 60y old  Male who presents with a chief complaint of vomiting (02 Jan 2021 14:36)      INTERVAL HPI/OVERNIGHT EVENTS: pt denies nausea, vomiting           T(C): 38 (01-02-21 @ 16:13), Max: 38 (01-02-21 @ 16:13)  HR: 103 (01-02-21 @ 16:13) (81 - 103)  BP: 109/65 (01-02-21 @ 16:13) (87/65 - 120/68)  RR: 16 (01-02-21 @ 16:13) (16 - 18)  SpO2: 95% (01-02-21 @ 16:13) (95% - 100%)          I&O's Summary    02 Jan 2021 07:01  -  02 Jan 2021 18:40  --------------------------------------------------------  IN: 600 mL / OUT: 2000 mL / NET: -1400 mL        REVIEW OF SYSTEMS: denies fever, chills, SOB, palpitations, chest pain, abdominal pain, nausea, vomitting, diarrhea, constipation, dizziness    MEDICATIONS  (STANDING):  calcium acetate 667 milliGRAM(s) Oral three times a day with meals  epoetin beverley-epbx (RETACRIT) Injectable 4000 Unit(s) IV Push <User Schedule>  folic acid 1 milliGRAM(s) Oral daily  heparin   Injectable 5000 Unit(s) SubCutaneous every 8 hours  hydrALAZINE 50 milliGRAM(s) Oral every 8 hours  insulin lispro (ADMELOG) corrective regimen sliding scale   SubCutaneous Before meals and at bedtime  metoclopramide 5 milliGRAM(s) Oral three times a day  metoprolol tartrate 12.5 milliGRAM(s) Oral two times a day  nortriptyline 10 milliGRAM(s) Oral daily  pantoprazole  Injectable 40 milliGRAM(s) IV Push daily  sevelamer carbonate 1600 milliGRAM(s) Oral three times a day with meals  simvastatin 40 milliGRAM(s) Oral at bedtime    MEDICATIONS  (PRN):  acetaminophen   Tablet .. 650 milliGRAM(s) Oral every 6 hours PRN Temp greater or equal to 38C (100.4F), Moderate Pain (4 - 6)  guaiFENesin   Syrup  (Sugar-Free) 100 milliGRAM(s) Oral every 6 hours PRN Cough  hydrALAZINE Injectable 5 milliGRAM(s) IV Push every 8 hours PRN SBP >160  ondansetron Injectable 4 milliGRAM(s) IV Push every 6 hours PRN Nausea and/or Vomiting  oxycodone    5 mG/acetaminophen 325 mG 1 Tablet(s) Oral every 6 hours PRN Severe Pain (7 - 10)      PHYSICAL EXAM:  GENERAL: NAD, well-developed, sleeping  HEAD:  Atraumatic, Normocephalic  EYES: conjunctiva and sclera clear  NECK: Supple, No JVD   NERVOUS SYSTEM:  drowsy, but oriented x3  CHEST/LUNG: Clear to auscultation bilaterally; No rales, rhonchi, wheezing, or rubs  SKIN: No rashes or lesions    Refused rest of examination      LABS:                        12.0   5.50  )-----------( 142      ( 02 Jan 2021 09:08 )             38.6     01-02    135  |  92<L>  |  62<H>  ----------------------------<  93  4.2   |  24  |  15.10<H>    Ca    7.6<L>      02 Jan 2021 09:08  Phos  7.9     01-02  Mg     2.5     01-02    TPro  7.3  /  Alb  3.4<L>  /  TBili  0.6  /  DBili  x   /  AST  6<L>  /  ALT  14  /  AlkPhos  97  01-02        CAPILLARY BLOOD GLUCOSE      POCT Blood Glucose.: 88 mg/dL (02 Jan 2021 17:44)  POCT Blood Glucose.: 66 mg/dL (02 Jan 2021 16:38)  POCT Blood Glucose.: 87 mg/dL (02 Jan 2021 11:17)  POCT Blood Glucose.: 103 mg/dL (02 Jan 2021 08:35)  POCT Blood Glucose.: 108 mg/dL (01 Jan 2021 21:06)

## 2021-01-02 NOTE — PROGRESS NOTE ADULT - ASSESSMENT
60 M  from [facility] with PMH of ESRD on HD TTS, HTN, DM, partially blind and s/p left BKA brought to ED due to vomiting and RLQ abdominal pain found to be in metabolic derangement, suspect secondary to uremia vs distal esophagitis. CT A/P showing distal esophagitis, however patient unable to go for scheduled EGD given refusal of dialysis on day of planned EGD (has subsequently been consented/assented by HCP brother via telephone as detailed in [facility] discussion note above). Blood cultures negative to date. Psychiatry seen and evaluated at bedside with me on 12/31, determined patient does not have capacity and HCP is brother.  EGD to be rescheduled for possibly Monday 01/4/2021 if symptoms recur. Currently symptoms have subsided and patient may not need EGD.         A/P  Intractable nausea, vomiting, suspect secondary to uremia vs distal esophagitis noted on CT abdomen - resolved  Anion gap metabolic acidosis secondary to ESRD, Fs within normal appears to have tight glycemic control  Hypertensive urgency - resolved s/p dialysis, clonidine patch   ESRD on HD  Hyperphosphatemia  Hyperkalemia - resolved  Anemia secondary to ESRD  T2DM with fructosamine level of 300s, correlation with A1c of 8.8  s/p Left BKA    Plan  Last HD on 01/02/21  clonidine patch added per Nephrology recommendation, IV hydralazine 5mg PRN  CXR negative, blood cultures testing, holding on antibiotics  GI consulted for possible distal esophagitis accompanied by nausea, vomiting, possible EGD to be rescheduled for Monday 1/4 if patient continues to be symptomatic, however currently seeming to have improved after undergoing HD.   Not a candidate for oral hypoglycemic given ESRD status. Recommend lantus 5 units at bedtime once patient able to advance diet.  Advance diet from full to regular renal/DASH diet.

## 2021-01-02 NOTE — PROGRESS NOTE ADULT - SUBJECTIVE AND OBJECTIVE BOX
Patient is a 60y Male with  ESRD  ON  HD   IS  NON  COMPLAINT  WITH  TREATEMENT  SCHEDULE,     CAME IN  WITH  VOMITING  FEELS  OK  AT  THE  TIME OF  EXAM      fish (Rash)  liver (Anaphylaxis)  No Known Drug Allergies    Hospital Medications:   MEDICATIONS  (STANDING):  calcium acetate 667 milliGRAM(s) Oral three times a day with meals  cloNIDine Patch 0.1 mG/24Hr(s) 1 patch Transdermal <User Schedule>  epoetin beverley-epbx (RETACRIT) Injectable 4000 Unit(s) IV Push <User Schedule>  folic acid 1 milliGRAM(s) Oral daily  heparin   Injectable 5000 Unit(s) SubCutaneous every 8 hours  hydrALAZINE 50 milliGRAM(s) Oral every 8 hours  insulin lispro (ADMELOG) corrective regimen sliding scale   SubCutaneous Before meals and at bedtime  metoclopramide 5 milliGRAM(s) Oral three times a day  metoprolol tartrate 12.5 milliGRAM(s) Oral two times a day  nortriptyline 10 milliGRAM(s) Oral daily  pantoprazole  Injectable 40 milliGRAM(s) IV Push daily  sevelamer carbonate 1600 milliGRAM(s) Oral three times a day with meals  simvastatin 40 milliGRAM(s) Oral at bedtime    REVIEW OF SYSTEMS:  FEELS  SLEEPY   HAS NO  FEVER/CHILLS  NO CH  PAIN  OR  PALP   NO  COUGH OR  SOB   APPETITE IS  OK     NO  N/V     VITALS:  T(F): 98.8 (01-02-21 @ 12:24), Max: 99.2 (01-02-21 @ 09:00)  HR: 95 (01-02-21 @ 12:24)  BP: 103/66 (01-02-21 @ 12:24)  RR: 17 (01-02-21 @ 12:24)  SpO2: 100% (01-02-21 @ 12:24)  Wt(kg): --    01-02 @ 07:01  -  01-02 @ 14:36  --------------------------------------------------------  IN: 600 mL / OUT: 2000 mL / NET: -1400 mL        PHYSICAL EXAM:  Constitutional: NAD  HEENT: CONJ PINK  Neck: No JVD  Respiratory: CTAB, no wheezes, rales or rhonchi  Cardiovascular: S1, S2, RRR  Gastrointestinal: BS+, soft, NT/ND  Extremities:  No peripheral edema R  L-  BKA  Neurological: A/O x 3,   :  No [place].     Vascular Access: R  UPPER  ARM AV  ACCESS,  HAS  DRESSING IN  PLACE    LABS:  01-02    135  |  92<L>  |  62<H>  ----------------------------<  93  4.2   |  24  |  15.10<H>    Ca    7.6<L>      02 Jan 2021 09:08  Phos  7.9     01-02  Mg     2.5     01-02    TPro  7.3  /  Alb  3.4<L>  /  TBili  0.6  /  DBili      /  AST  6<L>  /  ALT  14  /  AlkPhos  97  01-02    Creatinine Trend: 15.10 <--, 11.60 <--, 20.50 <--, 19.10 <--, 14.40 <--, 19.90 <--, 18.10 <--, 19.80 <--                        12.0   5.50  )-----------( 142      ( 02 Jan 2021 09:08 )             38.6     Urine Studies:      RADIOLOGY & ADDITIONAL STUDIES:

## 2021-01-03 DIAGNOSIS — R50.9 FEVER, UNSPECIFIED: ICD-10-CM

## 2021-01-03 DIAGNOSIS — Z29.9 ENCOUNTER FOR PROPHYLACTIC MEASURES, UNSPECIFIED: ICD-10-CM

## 2021-01-03 LAB
ALBUMIN SERPL ELPH-MCNC: 3.9 G/DL — SIGNIFICANT CHANGE UP (ref 3.5–5)
ALP SERPL-CCNC: 109 U/L — SIGNIFICANT CHANGE UP (ref 40–120)
ALT FLD-CCNC: 20 U/L DA — SIGNIFICANT CHANGE UP (ref 10–60)
ANION GAP SERPL CALC-SCNC: 25 MMOL/L — HIGH (ref 5–17)
AST SERPL-CCNC: 11 U/L — SIGNIFICANT CHANGE UP (ref 10–40)
BILIRUB SERPL-MCNC: 0.8 MG/DL — SIGNIFICANT CHANGE UP (ref 0.2–1.2)
BUN SERPL-MCNC: 40 MG/DL — HIGH (ref 7–18)
CALCIUM SERPL-MCNC: 8.5 MG/DL — SIGNIFICANT CHANGE UP (ref 8.4–10.5)
CHLORIDE SERPL-SCNC: 92 MMOL/L — LOW (ref 96–108)
CO2 SERPL-SCNC: 19 MMOL/L — LOW (ref 22–31)
CREAT SERPL-MCNC: 10.9 MG/DL — HIGH (ref 0.5–1.3)
CULTURE RESULTS: SIGNIFICANT CHANGE UP
GLUCOSE BLDC GLUCOMTR-MCNC: 125 MG/DL — HIGH (ref 70–99)
GLUCOSE BLDC GLUCOMTR-MCNC: 137 MG/DL — HIGH (ref 70–99)
GLUCOSE BLDC GLUCOMTR-MCNC: 199 MG/DL — HIGH (ref 70–99)
GLUCOSE BLDC GLUCOMTR-MCNC: 341 MG/DL — HIGH (ref 70–99)
GLUCOSE SERPL-MCNC: 128 MG/DL — HIGH (ref 70–99)
HCT VFR BLD CALC: 42.8 % — SIGNIFICANT CHANGE UP (ref 39–50)
HGB BLD-MCNC: 12.9 G/DL — LOW (ref 13–17)
LIDOCAIN IGE QN: 269 U/L — SIGNIFICANT CHANGE UP (ref 73–393)
MAGNESIUM SERPL-MCNC: 2.5 MG/DL — SIGNIFICANT CHANGE UP (ref 1.6–2.6)
MCHC RBC-ENTMCNC: 28.3 PG — SIGNIFICANT CHANGE UP (ref 27–34)
MCHC RBC-ENTMCNC: 30.1 GM/DL — LOW (ref 32–36)
MCV RBC AUTO: 93.9 FL — SIGNIFICANT CHANGE UP (ref 80–100)
NRBC # BLD: 0 /100 WBCS — SIGNIFICANT CHANGE UP (ref 0–0)
PHOSPHATE SERPL-MCNC: 6.5 MG/DL — HIGH (ref 2.5–4.5)
PLATELET # BLD AUTO: 145 K/UL — LOW (ref 150–400)
POTASSIUM SERPL-MCNC: 3.7 MMOL/L — SIGNIFICANT CHANGE UP (ref 3.5–5.3)
POTASSIUM SERPL-SCNC: 3.7 MMOL/L — SIGNIFICANT CHANGE UP (ref 3.5–5.3)
PROT SERPL-MCNC: 8.5 G/DL — HIGH (ref 6–8.3)
RBC # BLD: 4.56 M/UL — SIGNIFICANT CHANGE UP (ref 4.2–5.8)
RBC # FLD: 18.6 % — HIGH (ref 10.3–14.5)
SODIUM SERPL-SCNC: 136 MMOL/L — SIGNIFICANT CHANGE UP (ref 135–145)
SPECIMEN SOURCE: SIGNIFICANT CHANGE UP
WBC # BLD: 7.68 K/UL — SIGNIFICANT CHANGE UP (ref 3.8–10.5)
WBC # FLD AUTO: 7.68 K/UL — SIGNIFICANT CHANGE UP (ref 3.8–10.5)

## 2021-01-03 PROCEDURE — 99232 SBSQ HOSP IP/OBS MODERATE 35: CPT | Mod: GC

## 2021-01-03 PROCEDURE — 71045 X-RAY EXAM CHEST 1 VIEW: CPT | Mod: 26

## 2021-01-03 RX ORDER — DEXTROSE 50 % IN WATER 50 %
50 SYRINGE (ML) INTRAVENOUS
Refills: 0 | Status: DISCONTINUED | OUTPATIENT
Start: 2021-01-03 | End: 2021-01-07

## 2021-01-03 RX ORDER — SODIUM CHLORIDE 9 MG/ML
1000 INJECTION INTRAMUSCULAR; INTRAVENOUS; SUBCUTANEOUS
Refills: 0 | Status: DISCONTINUED | OUTPATIENT
Start: 2021-01-03 | End: 2021-01-03

## 2021-01-03 RX ORDER — METOCLOPRAMIDE HCL 10 MG
5 TABLET ORAL EVERY 8 HOURS
Refills: 0 | Status: DISCONTINUED | OUTPATIENT
Start: 2021-01-03 | End: 2021-01-07

## 2021-01-03 RX ORDER — SODIUM CHLORIDE 9 MG/ML
250 INJECTION INTRAMUSCULAR; INTRAVENOUS; SUBCUTANEOUS ONCE
Refills: 0 | Status: COMPLETED | OUTPATIENT
Start: 2021-01-03 | End: 2021-01-03

## 2021-01-03 RX ORDER — PANTOPRAZOLE SODIUM 20 MG/1
40 TABLET, DELAYED RELEASE ORAL
Refills: 0 | Status: DISCONTINUED | OUTPATIENT
Start: 2021-01-03 | End: 2021-01-06

## 2021-01-03 RX ORDER — PANTOPRAZOLE SODIUM 20 MG/1
40 TABLET, DELAYED RELEASE ORAL DAILY
Refills: 0 | Status: DISCONTINUED | OUTPATIENT
Start: 2021-01-03 | End: 2021-01-03

## 2021-01-03 RX ORDER — DEXTROSE 50 % IN WATER 50 %
25 SYRINGE (ML) INTRAVENOUS ONCE
Refills: 0 | Status: COMPLETED | OUTPATIENT
Start: 2021-01-03 | End: 2021-01-03

## 2021-01-03 RX ORDER — ACETAMINOPHEN 500 MG
1000 TABLET ORAL ONCE
Refills: 0 | Status: COMPLETED | OUTPATIENT
Start: 2021-01-03 | End: 2021-01-03

## 2021-01-03 RX ORDER — METOCLOPRAMIDE HCL 10 MG
10 TABLET ORAL ONCE
Refills: 0 | Status: COMPLETED | OUTPATIENT
Start: 2021-01-03 | End: 2021-01-03

## 2021-01-03 RX ADMIN — Medication 5 MILLIGRAM(S): at 21:10

## 2021-01-03 RX ADMIN — ONDANSETRON 4 MILLIGRAM(S): 8 TABLET, FILM COATED ORAL at 18:02

## 2021-01-03 RX ADMIN — SODIUM CHLORIDE 500 MILLILITER(S): 9 INJECTION INTRAMUSCULAR; INTRAVENOUS; SUBCUTANEOUS at 07:13

## 2021-01-03 RX ADMIN — Medication 25 MILLILITER(S): at 10:13

## 2021-01-03 RX ADMIN — Medication 1000 MILLIGRAM(S): at 08:13

## 2021-01-03 RX ADMIN — Medication 5 MILLIGRAM(S): at 13:19

## 2021-01-03 RX ADMIN — Medication 400 MILLIGRAM(S): at 07:16

## 2021-01-03 RX ADMIN — PANTOPRAZOLE SODIUM 40 MILLIGRAM(S): 20 TABLET, DELAYED RELEASE ORAL at 11:32

## 2021-01-03 RX ADMIN — Medication 1: at 21:08

## 2021-01-03 RX ADMIN — Medication 50 MILLILITER(S): at 15:01

## 2021-01-03 RX ADMIN — Medication 10 MILLIGRAM(S): at 06:05

## 2021-01-03 NOTE — PROGRESS NOTE ADULT - ASSESSMENT
Patient is a 60 year old male from [place] [address], with PMH of ESRD on HD TTS, HTN, DM, partially blind and s/p left BKA, who presented with vomiting, and was admitted for dialysis and work-up of possible esophagitis vs uremia vs diabetic gastroparesis.     Hgb of 12.6. Potassium of 7.3 with repeat of 5.9. Creatinine of 19.80. COVID negative. CT abdomen showing thickened distal esophagus. Mild diffuse bladder wall thickening. Given NS bolus, LR bolus, lokelma, reglan and zofran in ED.  Patient is a 60 year old male from [place] [address], with PMH of ESRD on HD TTS, HTN, DM, partially blind and s/p left BKA, who presented with vomiting, and was admitted for dialysis and work-up of possible esophagitis vs uremia vs diabetic gastroparesis.     Hgb of 12.6. Potassium of 7.3 with repeat of 5.9. Creatinine of 19.80. COVID negative. CT abdomen showing thickened distal esophagus. Mild diffuse bladder wall thickening. Given NS bolus, LR bolus, lokelma, reglan and zofran in ED.

## 2021-01-03 NOTE — DIETITIAN INITIAL EVALUATION ADULT. - FEEDING ASSISTANCE
Nursing to continue feeding assistance and encouragement, aspiration precaution; Provide food choices within diet Rx as available/updated

## 2021-01-03 NOTE — PROGRESS NOTE ADULT - SUBJECTIVE AND OBJECTIVE BOX
PGY-1 Progress Note discussed with attending    PAGER #: [[phone]] TILL 5:00 PM  PLEASE CONTACT ON CALL TEAM:  - On Call Team (Please refer to Dejon) FROM 5:00 PM - 8:30PM  - Nightfloat Team FROM 8:30 -7:30 AM    CHIEF COMPLAINT & BRIEF HOSPITAL COURSE:    INTERVAL HPI/OVERNIGHT EVENTS:       REVIEW OF SYSTEMS:  CONSTITUTIONAL: No fever, weight loss, or fatigue  RESPIRATORY: No cough, wheezing, chills or hemoptysis; No shortness of breath  CARDIOVASCULAR: No chest pain, palpitations, dizziness, or leg swelling  GASTROINTESTINAL: No abdominal pain. No nausea, vomiting, or hematemesis; No diarrhea or constipation. No melena or hematochezia.  GENITOURINARY: No dysuria or hematuria, urinary frequency  NEUROLOGICAL: No headaches, memory loss, loss of strength, numbness, or tremors  SKIN: No itching, burning, rashes, or lesions     MEDICATIONS  (STANDING):  calcium acetate 667 milliGRAM(s) Oral three times a day with meals  epoetin beverley-epbx (RETACRIT) Injectable 4000 Unit(s) IV Push <User Schedule>  folic acid 1 milliGRAM(s) Oral daily  heparin   Injectable 5000 Unit(s) SubCutaneous every 8 hours  insulin lispro (ADMELOG) corrective regimen sliding scale   SubCutaneous Before meals and at bedtime  nortriptyline 10 milliGRAM(s) Oral daily  pantoprazole  Injectable 40 milliGRAM(s) IV Push daily  sevelamer carbonate 1600 milliGRAM(s) Oral three times a day with meals  simvastatin 40 milliGRAM(s) Oral at bedtime  sodium chloride 0.9%. 1000 milliLiter(s) (70 mL/Hr) IV Continuous <Continuous>    MEDICATIONS  (PRN):  acetaminophen   Tablet .. 650 milliGRAM(s) Oral every 6 hours PRN Temp greater or equal to 38C (100.4F), Moderate Pain (4 - 6)  guaiFENesin   Syrup  (Sugar-Free) 100 milliGRAM(s) Oral every 6 hours PRN Cough  hydrALAZINE Injectable 5 milliGRAM(s) IV Push every 8 hours PRN SBP >160  metoclopramide Injectable 5 milliGRAM(s) IV Push every 8 hours PRN Vomiting  ondansetron Injectable 4 milliGRAM(s) IV Push every 6 hours PRN Nausea and/or Vomiting  oxycodone    5 mG/acetaminophen 325 mG 1 Tablet(s) Oral every 6 hours PRN Severe Pain (7 - 10)      Vital Signs Last 24 Hrs  T(C): 36.7 (03 Jan 2021 05:33), Max: 38 (02 Jan 2021 16:13)  T(F): 98 (03 Jan 2021 05:33), Max: 100.4 (02 Jan 2021 16:13)  HR: 108 (03 Jan 2021 08:33) (103 - 116)  BP: 136/62 (03 Jan 2021 08:33) (91/52 - 157/87)  BP(mean): --  RR: 18 (03 Jan 2021 08:33) (16 - 19)  SpO2: 100% (03 Jan 2021 08:33) (95% - 100%)    PHYSICAL EXAMINATION:  GENERAL: NAD, well built  HEAD:  Atraumatic, Normocephalic  EYES:  conjunctiva and sclera clear  NECK: Supple, No JVD, Normal thyroid  CHEST/LUNG: Clear to auscultation. Clear to percussion bilaterally; No rales, rhonchi, wheezing, or rubs  HEART: Regular rate and rhythm; No murmurs, rubs, or gallops  ABDOMEN: Soft, Nontender, Nondistended; Bowel sounds present  NERVOUS SYSTEM:  Alert & Oriented X3,    EXTREMITIES:  2+ Peripheral Pulses, No clubbing, cyanosis, or edema  SKIN: warm dry                          12.9   7.68  )-----------( 145      ( 03 Jan 2021 05:41 )             42.8     01-03    136  |  92<L>  |  40<H>  ----------------------------<  128<H>  3.7   |  19<L>  |  10.90<H>    Ca    8.5      03 Jan 2021 05:41  Phos  6.5     01-03  Mg     2.5     01-03    TPro  8.5<H>  /  Alb  3.9  /  TBili  0.8  /  DBili  x   /  AST  11  /  ALT  20  /  AlkPhos  109  01-03    LIVER FUNCTIONS - ( 03 Jan 2021 05:41 )  Alb: 3.9 g/dL / Pro: 8.5 g/dL / ALK PHOS: 109 U/L / ALT: 20 U/L DA / AST: 11 U/L / GGT: x                   CAPILLARY BLOOD GLUCOSE      RADIOLOGY & ADDITIONAL TESTS:                   PGY-1 Progress Note discussed with attending    PAGER #: [[phone]] TILL 5:00 PM  PLEASE CONTACT ON CALL TEAM:  - On Call Team (Please refer to Dejon) FROM 5:00 PM - 8:30PM  - Nightfloat Team FROM 8:30 -7:30 AM    CHIEF COMPLAINT & BRIEF HOSPITAL COURSE:  Patient is a 60M from [place] [address], with PMH of ESRD on HD TTS, HTN, DM, partially blind and s/p left BKA, who presented to the ED due to vomiting. Patient states that he was feeling fine until today when he started to have nausea and multiple episodes of vomiting. Patient unable to state exactly how many episodes he had. Denies any signs of blood as far as he knows. Patient also had some abdominal pain mainly located on RLQ but states it is improved now. Patient states that he has had similar episodes previously and has gone to hospital but unsure exactly about what is the cause of symptoms. Patient states he normally has HD sessions on T/T/S but it was changed due to the holidays, and he missed another session due to feeling sick. Currently, patient denies any chest pain, shortness of breath, headache, dizziness or abdominal pain. He was admitted for management of intractable vomiting which is likely 2/2 to diabetic gastroparesis vs electrolyte derangements. He was mildly improved on Reglan, and was not able to tolerate the entire session of dialysis 12/28. After this, he refused AM meds and blood draws, and his BP remained elevated due to rebound HTN. Clonidine PO was made patch and labetalol 5mg push given with hydralazine PRN pushes. Pt now has IV access, clonidine patch has been D'christy and BP is better as he has restarted full sessions of HD (last UF removed as pt volume depleted). His brother and HCP (Georgi newman) was contacted and reaffirmed [facility] as full code status.     INTERVAL HPI/OVERNIGHT EVENTS:   Patient was examined while he was lying in bed, Aox3, not responding appropriately to questions (single word), in some distress as he is vomiting coffee ground emesis and retching. He is not tolerating PO diet well, spiked one fever in the last 24 hours, and has borderline BP. He was started on Fluids and then D'christy due to ESRD status. He has normal bowel and bladder movements, and denies any other acute complaints. Patient refused blood culture collection and reports that he will allow it if he spikes another fever. He was an RRT this AM due to AMS/ lethargy, but no intervention other than IVF and bloodwork showing improved labs was done.     REVIEW OF SYSTEMS:  CONSTITUTIONAL: Fatigue + No fever, weight loss  RESPIRATORY: No cough, wheezing, chills or hemoptysis; No shortness of breath  CARDIOVASCULAR: No chest pain, palpitations, dizziness, or leg swelling  GASTROINTESTINAL: Coffee ground emesis, retching+ No abdominal pain; No diarrhea or constipation. No melena or hematochezia.  GENITOURINARY: No dysuria or hematuria, urinary frequency  NEUROLOGICAL: No headaches, memory loss, loss of strength, numbness, or tremors  SKIN: No itching, burning, rashes, or lesions     MEDICATIONS  (STANDING):  calcium acetate 667 milliGRAM(s) Oral three times a day with meals  epoetin beverley-epbx (RETACRIT) Injectable 4000 Unit(s) IV Push <User Schedule>  folic acid 1 milliGRAM(s) Oral daily  heparin   Injectable 5000 Unit(s) SubCutaneous every 8 hours  insulin lispro (ADMELOG) corrective regimen sliding scale   SubCutaneous Before meals and at bedtime  nortriptyline 10 milliGRAM(s) Oral daily  pantoprazole  Injectable 40 milliGRAM(s) IV Push daily  sevelamer carbonate 1600 milliGRAM(s) Oral three times a day with meals  simvastatin 40 milliGRAM(s) Oral at bedtime  sodium chloride 0.9%. 1000 milliLiter(s) (70 mL/Hr) IV Continuous <Continuous>    MEDICATIONS  (PRN):  acetaminophen   Tablet .. 650 milliGRAM(s) Oral every 6 hours PRN Temp greater or equal to 38C (100.4F), Moderate Pain (4 - 6)  guaiFENesin   Syrup  (Sugar-Free) 100 milliGRAM(s) Oral every 6 hours PRN Cough  hydrALAZINE Injectable 5 milliGRAM(s) IV Push every 8 hours PRN SBP >160  metoclopramide Injectable 5 milliGRAM(s) IV Push every 8 hours PRN Vomiting  ondansetron Injectable 4 milliGRAM(s) IV Push every 6 hours PRN Nausea and/or Vomiting  oxycodone    5 mG/acetaminophen 325 mG 1 Tablet(s) Oral every 6 hours PRN Severe Pain (7 - 10)      Vital Signs Last 24 Hrs  T(C): 36.7 (03 Jan 2021 05:33), Max: 38 (02 Jan 2021 16:13)  T(F): 98 (03 Jan 2021 05:33), Max: 100.4 (02 Jan 2021 16:13)  HR: 108 (03 Jan 2021 08:33) (103 - 116)  BP: 136/62 (03 Jan 2021 08:33) (91/52 - 157/87)  BP(mean): --  RR: 18 (03 Jan 2021 08:33) (16 - 19)  SpO2: 100% (03 Jan 2021 08:33) (95% - 100%)    PHYSICAL EXAMINATION:  GENERAL: Thinly built AAM, in NAD   HEAD:  Atraumatic, Normocephalic  EYES:  conjunctiva and sclera clear  NECK: Supple, No JVD  CHEST/LUNG: Clear to auscultation.  No rales, rhonchi, wheezing, or rubs  HEART: Regular rate and rhythm; No murmurs, rubs, or gallops  ABDOMEN: Soft, Nontender, Nondistended; Bowel sounds increased, Coffee ground emesis in vomit pan  NERVOUS SYSTEM:  Alert but confused, Oriented X2, No motor or sensory deficits   EXTREMITIES: R AVF in place 2+ Peripheral Pulses, No clubbing, cyanosis, or edema  SKIN: warm dry                          12.9   7.68  )-----------( 145      ( 03 Jan 2021 05:41 )             42.8     01-03    136  |  92<L>  |  40<H>  ----------------------------<  128<H>  3.7   |  19<L>  |  10.90<H>    Ca    8.5      03 Jan 2021 05:41  Phos  6.5     01-03  Mg     2.5     01-03    TPro  8.5<H>  /  Alb  3.9  /  TBili  0.8  /  DBili  x   /  AST  11  /  ALT  20  /  AlkPhos  109  01-03    LIVER FUNCTIONS - ( 03 Jan 2021 05:41 )  Alb: 3.9 g/dL / Pro: 8.5 g/dL / ALK PHOS: 109 U/L / ALT: 20 U/L DA / AST: 11 U/L / GGT: x                   CAPILLARY BLOOD GLUCOSE      RADIOLOGY & ADDITIONAL TESTS:

## 2021-01-03 NOTE — DIETITIAN INITIAL EVALUATION ADULT. - PHYSCIAL ASSESSMENT
skin intact   Wt data in EMR a bit fluctuated, may due to fluid shift from HD and/or scale variance unable to assess; Left BKA noted unable to assess; Left BKA per chart unable to assess; Left BKA per chart, adjusted SBW=568.9 lb

## 2021-01-03 NOTE — DIETITIAN INITIAL EVALUATION ADULT. - OTHER INFO
Pt from [facility], legally blind, s/p Rapid Response today due to unresponsiveness, lethargy, pt visited, whole body covered in blanket, episodes of vomiting last night, poor oral intake, not eating per RN at present, 0 to 25% intake per flow sheets Pt from [facility], legally blind, s/p Rapid Response today due to unresponsiveness, lethargy, pt visited, whole body covered in blanket, episodes of vomiting last night, poor oral intake, not eating per RN at present, 0 to 25% intake per flow sheets; non-compliant to HD Tx, s/p Psychiatric consult, pt lacks capacity per MD; h/o DM, DahM8K=9.9, only on finger stick coverage, range=68 to 142 x 3d

## 2021-01-03 NOTE — PROGRESS NOTE ADULT - PROBLEM SELECTOR PLAN 1
- Pt BP was 201/99  - S/p metoprolol 12.5mg q12 and resumed his home medication.  - Started on Clonidine PO which pt is not tolerating due to vomiting. Switched to clonidine patch  - Will continue on  hydralazine IV pushes PRN  - Refused dialysis 12/29 , 12/30, got full dialysis 12/31/20  - Will monitor BP - Patient on HD T/T/S   - HD12/28 incomplete, refused 12/19, last 12/31, 1/1  - Phosphorus is 6.6 -> 6.4 :    C/w phoslo and monitor  - Hyperkalemia of 5.5 on adm-> 4.0mg/dl 1/1   - High anion gap metabolic acidosis due to ESRD (AG of 20)  - Normal Bicarb and VBG was normal  - Nephro Dr. Mosher  - Continue renvela   - Monitor BMP daily   - SW consult

## 2021-01-03 NOTE — PROGRESS NOTE ADULT - PROBLEM SELECTOR PLAN 7
- Patient on insulin at home Novolin 70/30 and SSI   - A1c is 5.9  - C/w SSI - Lipid panel shows mild TG elevation to 195, HDL 40  - On simvastatin 40mg, due to DM/ ESRD

## 2021-01-03 NOTE — PROGRESS NOTE ADULT - PROBLEM SELECTOR PLAN 5
- Patient on HD T/T/S last 12/31  - Last HD on Thursday and he was supposed to have session on Saturday but did not get to go   - HD12/28 incomplete, refused 12/19  - Nephro Dr. Mosher  - Continue renvela   - Monitor BMP daily   - SW consult - Patient on insulin at home Novolin 70/30 and SSI   - A1c is 5.9, fructosamine   - C/w SSI - Patient on insulin at home Novolin 70/30 and SSI   - A1c is 5.9, fructosamine 400's -> 8.8 A1c   - Low BG's 1/3, poor PO intake, given D5 25mg push   - C/w SSI  - Needs tighter control of glucose on discharge, will be cautious with insulin currently due to ESRD, unstable PO intake

## 2021-01-03 NOTE — DIETITIAN INITIAL EVALUATION ADULT. - PERTINENT MEDS FT
MEDICATIONS  (STANDING):  calcium acetate 667 milliGRAM(s) Oral three times a day with meals  epoetin beverley-epbx (RETACRIT) Injectable 4000 Unit(s) IV Push <User Schedule>  folic acid 1 milliGRAM(s) Oral daily  heparin   Injectable 5000 Unit(s) SubCutaneous every 8 hours  hydrALAZINE 50 milliGRAM(s) Oral every 8 hours  insulin lispro (ADMELOG) corrective regimen sliding scale   SubCutaneous Before meals and at bedtime  metoclopramide 5 milliGRAM(s) Oral three times a day  metoprolol tartrate 12.5 milliGRAM(s) Oral two times a day  nortriptyline 10 milliGRAM(s) Oral daily  pantoprazole  Injectable 40 milliGRAM(s) IV Push daily  sevelamer carbonate 1600 milliGRAM(s) Oral three times a day with meals  simvastatin 40 milliGRAM(s) Oral at bedtime  sodium chloride 0.9%. 1000 milliLiter(s) (70 mL/Hr) IV Continuous <Continuous>

## 2021-01-03 NOTE — DIETITIAN INITIAL EVALUATION ADULT. - FACTORS AFF FOOD INTAKE
acute on chronic comorbidities with intermittent nausea/vomiting, ESRD on HD/change in mental status/vomiting

## 2021-01-03 NOTE — DIETITIAN INITIAL EVALUATION ADULT. - PERTINENT LABORATORY DATA
01-03 Na136 mmol/L Glu 128 mg/dL<H> K+ 3.7 mmol/L Cr  10.90 mg/dL<H> BUN 40 mg/dL<H>   01-03 Phos 6.5 mg/dL<H>   01-03 Alb 3.9 g/dL       12-28 Chol 146 mg/dL LDL --    HDL 40 mg/dL<L> Trig 195 mg/dL<H>  12-28-20 @ 10:41 HgbA1C 5.9 [4.0 - 5.6]

## 2021-01-03 NOTE — DIETITIAN INITIAL EVALUATION ADULT. - ETIOLOGY
acute on chronic comorbidities with intermittent nausea/vomiting, increased nutrient needs for catabolic Tx of HD; cognitive deficit, lethargy

## 2021-01-03 NOTE — PROGRESS NOTE ADULT - PROBLEM SELECTOR PLAN 2
- High anion gap metabolic acidosis due to ESRD.  - Anion gap of 20  - Normal Bicarb and VBG was normal  - Serum ketones positive - Pt p/w vomiting initially Non bilious/ non bloody thought to be uremic in etiology  - Has continued to have vomiting, now coffee ground emesis   - Hb is stable around 12g/dl  - Pt is not able to tolerate PO diet  - Reduced after restarting HD, but not symptomatically relieved with HD or reglan/zofran  - CT A/P showing thickened distal esophagus suggesting esophagitis  - May be esophageal mucosal tear/ esophagitis due to intense retching  - For EGD 1/4 AM, NPO after midnight 1/3  - GI Dr. Molina

## 2021-01-03 NOTE — PROGRESS NOTE ADULT - PROBLEM SELECTOR PLAN 3
- 1/1: 4.0mg/dl  - Potassium of 5.5 and most likely due to ESRD.  - will continue to monitor potassium   - Nephro Dr. Mosher consulted - Pt spiked fever of Tmax 100.4 1/2/2021, no further episodes   - No left shift/ WBC on blood work   - Pt refused blood Cx's, reported will allow if spikes a fever again  - CXR wnl, no infiltrates significant for aspiration; unremarkable lung exam   - UA to be followed after straight cath  - WIll continue to monitor for localizing cause

## 2021-01-03 NOTE — PROGRESS NOTE ADULT - ASSESSMENT
A/P  ESRD  ON  HD,  WAS  DIALYZED  YESTERDAY   FOR  REGULAR  HD ON  TUESDAY   HB GOOD,  D/C  AYAN   K OK   BP IS  OK ,  WAS  TAKEN  OF  CLONIDINE  PATCH  YESTERDAY  HOLD  HEPARIN  AS  WELL  AS   HE  HAS COFFEE GROUND VOMITING   WILL  D/C IV  FLUIDS AS  HE  IS  ESRD  AND  HAS  RECEIVED ABOUT A LITER  ALREADY  GO T  REGLAN  FOR VOMITING  EARLIER  WILL  FOLLOW

## 2021-01-03 NOTE — PROGRESS NOTE ADULT - SUBJECTIVE AND OBJECTIVE BOX
Patient is a 60y Male with  ESRD ON  HD    WAS  DIALYZED  YESTERDAY   EVENTS  FROM  EARLIER  TODAY  NOTED  RAPID RESPONSE CALLED  EARLIER  AS HE  WAS  BRIEFLY  UNRESPONSIVE   ALSO WAS  VOMITING  EARLIER   IS AWAKE  AND  RESPONSIVE  NOW,    ANSWERS SIMPLE QUESTIONS      fish (Rash)  liver (Anaphylaxis)  No Known Drug Allergies    Hospital Medications:   MEDICATIONS  (STANDING):  calcium acetate 667 milliGRAM(s) Oral three times a day with meals  epoetin beverley-epbx (RETACRIT) Injectable 4000 Unit(s) IV Push <User Schedule>  folic acid 1 milliGRAM(s) Oral daily  heparin   Injectable 5000 Unit(s) SubCutaneous every 8 hours  insulin lispro (ADMELOG) corrective regimen sliding scale   SubCutaneous Before meals and at bedtime  nortriptyline 10 milliGRAM(s) Oral daily  pantoprazole  Injectable 40 milliGRAM(s) IV Push daily  sevelamer carbonate 1600 milliGRAM(s) Oral three times a day with meals  simvastatin 40 milliGRAM(s) Oral at bedtime  sodium chloride 0.9%. 1000 milliLiter(s) (70 mL/Hr) IV Continuous <Continuous>    REVIEW OF SYSTEMS:  HAS  NO  FEVER/CHILLS   NO COUGH  OR  SOB  NO  CH PAIN  OR PALP   NO  NAUSEA  VOMITED 2-3  TIMES  EARLIER  TODAY,    HAS POOR APPETITE   NO  ABD  PAIN    VITALS:  T(F): 98 (01-03-21 @ 05:33), Max: 100.4 (01-02-21 @ 16:13)  HR: 108 (01-03-21 @ 08:33)  BP: 136/62 (01-03-21 @ 08:33)  RR: 18 (01-03-21 @ 08:33)  SpO2: 100% (01-03-21 @ 08:33)  Wt(kg): --    01-02 @ 07:01  -  01-03 @ 07:00  --------------------------------------------------------  IN: 600 mL / OUT: 2000 mL / NET: -1400 mL        PHYSICAL EXAM:  Constitutional: NAD  HEENT: CONJ  PINK  Neck: No JVD  Respiratory: CTAB, no wheezes, rales or rhonchi  Cardiovascular: S1, S2, RRR  Gastrointestinal: BS+, soft, NT/ND  Extremities:  No peripheral edema  Neurological: A/O x 1  : No [place].   Vascular Access: AVF R  UPPER  ARM,  WORKING  WELL    LABS:  01-03    136  |  92<L>  |  40<H>  ----------------------------<  128<H>  3.7   |  19<L>  |  10.90<H>    Ca    8.5      03 Jan 2021 05:41  Phos  6.5     01-03  Mg     2.5     01-03    TPro  8.5<H>  /  Alb  3.9  /  TBili  0.8  /  DBili      /  AST  11  /  ALT  20  /  AlkPhos  109  01-03    Creatinine Trend: 10.90 <--, 15.10 <--, 11.60 <--, 20.50 <--, 19.10 <--, 14.40 <--, 19.90 <--, 18.10 <--, 19.80 <--                        12.9   7.68  )-----------( 145      ( 03 Jan 2021 05:41 )             42.8     Urine Studies:      RADIOLOGY & ADDITIONAL STUDIES:

## 2021-01-03 NOTE — DIETITIAN INITIAL EVALUATION ADULT. - DIET TYPE
If po diet continued, Nepro 1can bid daily as medically feasible ( 850 kcal, 38 g protein) If po diet continued, soft food with Nepro 1can bid daily as medically feasible ( 850 kcal, 38 g protein)

## 2021-01-03 NOTE — RAPID RESPONSE TEAM SUMMARY - NSSITUATIONBACKGROUNDRRT_GEN_ALL_CORE
Patient is a 60 year old male from [place] [address], with PMH of ESRD on HD TTS, HTN, DM, partially blind and s/p left BKA, who presented to the ED due to vomiting. He has been admitted for intractable vomiting secondary to esophagitis VS metabolic derangements.   RRT was called this morning as patient was noticed to be unresponsive. Upon arrival of team, patient was sitting in bed, moaning and appeared lethargic. He tracked to his name, was confused and not following commands. As per RN, patient is confused at baseline but generally aggressive and pulled his IV lines earlier. She further added that patient has had >6 episodes of NBNB vomiting last night and is not eating.   *Patient's VS showed: /77 ,  , SPO2 99% on RA,  temp 98F. FS:137   *On exam, he appeared dry, CNS: confused, restless, CVS: s1s2, Resp: clear, ABD: mild epigastric tenderness   -Patient lethargy is likely secondary to worsening Dehydration from GI losses and poor PO intake. He had 2 more episodes of brownish colored vomiting.   -IV line was placed, will give small 250cc bolus and NS 2 70cc/hr for 12 hr. Avoid aggressive IV hydration given ESRD status.  -He is s/p HD today, doubt if symptoms are secondary to uremia, will repeat all labs cbc, bmp ,lipase  type and screen  -IV Reglan  for symptomatic relief    Patient is a 60 year old male from [place] [address], with PMH of ESRD on HD TTS, HTN, DM, partially blind and s/p left BKA, who presented to the ED due to vomiting. He has been admitted for intractable vomiting secondary to esophagitis VS metabolic derangements.   RRT was called this morning as patient was noticed to be unresponsive. Upon arrival of team, patient was sitting in bed, moaning and appeared lethargic. He tracked to his name, was confused and not following commands. As per RN, patient is confused at baseline but generally aggressive and pulled his IV lines earlier. She further added that patient has had >6 episodes of NBNB vomiting last night and is not eating.   *Patient's VS showed: /77 ,  , SPO2 99% on RA,  temp 98F. FS:137   *On exam, he appeared dry, CNS: confused, restless, CVS: s1s2, Resp: clear, ABD: mild epigastric tenderness   -Patient lethargy is likely secondary to worsening Dehydration from GI losses and poor PO intake. He had 2 more episodes of brownish colored vomiting.   -IV line was placed, will give small 250cc bolus and NS 2 70cc/hr for 12 hr. Avoid aggressive IV hydration given ESRD status.  -He is s/p HD today, doubt if symptoms are secondary to uremia, will repeat all labs cbc, bmp ,lipase  type and screen, ABG (doubt CO2 Narcosis)   -IV Reglan  for symptomatic relief    Patient is a 60 year old male from [place] [address], with PMH of ESRD on HD TTS, HTN, DM, partially blind and s/p left BKA, who presented to the ED due to vomiting. He has been admitted for intractable vomiting secondary to esophagitis VS metabolic derangements.   RRT was called this morning as patient was noticed to be unresponsive. Upon arrival of team, patient was sitting in bed, moaning and appeared lethargic. He tracked to his name, was confused and not following commands. As per RN, patient is confused at baseline but generally aggressive and pulled his IV lines earlier. She further added that patient has had >6 episodes of NBNB vomiting last night and is not eating.   *Patient's VS showed: /77 ,  , SPO2 99% on RA,  temp 98F. FS:137   *On exam, he appeared dry, CNS: confused, restless, CVS: s1s2, Resp: clear, ABD: mild epigastric tenderness   -Patient lethargy is likely secondary to worsening Dehydration from GI losses and poor PO intake. He had 2 more episodes of brownish colored vomiting.   -IV line was placed, will give small 250cc bolus and NS 2 70cc/hr for 12 hr. Avoid aggressive IV hydration given ESRD status.  -He is s/p HD today, doubt if symptoms are secondary to uremia, will repeat all labs cbc, bmp ,lipase  type and screen, no need for ABG(doubt CO2 Narcosis)   -IV Reglan  for symptomatic relief

## 2021-01-03 NOTE — PROGRESS NOTE ADULT - PROBLEM SELECTOR PLAN 8
- continue home med of simvastatin  - lipid profile wnl Was on heparin SQ, held due to possible hematemesis, for EGD 1/4 AM   IMPROVE VTE Individual Risk Assessment  RISK                                                                Points  [  ] Previous VTE                                                  3  [  ] Thrombophilia                                               2  [  ] Lower limb paralysis                                      2  [  ] Current Cancer                                              2   [ x ] Immobilization > 24 hrs                                1  [  ] ICU/CCU stay > 24 hours                              1  [  ] Age > 60                                                      1  IMPROVE VTE Score ___1______

## 2021-01-03 NOTE — PROGRESS NOTE ADULT - PROBLEM SELECTOR PLAN 6
- pt refuses daily labs   - Patient with Hgb of 10.8  -> 13.0  - Likely secondary to ESRD  - On Epogen  - Monitor CBC daily  - Continue home med of folic acid

## 2021-01-03 NOTE — PROGRESS NOTE ADULT - PROBLEM SELECTOR PLAN 4
- Phosphorus is 6.6 -> 6.4  - C/w phoslo and monitor - Pt BP was 201/99  - S/p metoprolol 12.5mg q12 and resumed his home medication.  - Started on Clonidine PO which pt is not tolerating due to vomiting. Switched to clonidine patch, now discontinued   - Will continue on  hydralazine IV pushes PRN  - Refused dialysis 12/29 , 12/30, got full dialysis 12/31/20, 1/2  - Will monitor BP

## 2021-01-03 NOTE — PROGRESS NOTE ADULT - ATTENDING COMMENTS
MEDICAL ATTENDING COVERING DR. De La Torre      Patient seen and examined this morning. Noted to be lethargic since yesterday, rapid response called overnight given ongoing lethargy. Repeat labs do not show any significant abnormality. Of note, patient had fever of 100.4 yesterday afternoon. He has continued to have nausea, vomiting overnight, multiple episodes of brown-colored emesis. CXR performed negative for signs of aspiration pneumonia. UA with culture pending. Blood cultures pending (patient agreeable to have blood drawn with subsequent febrile episodes).  In brief, patient is a 60 M  from [facility] with PMH of ESRD on HD TTS, HTN, DM, partially blind and s/p left BKA brought to ED due to vomiting and RLQ abdominal pain found to be in metabolic derangement. has normal wbc with neutrophilia. CT A/P showing distal esophagitis, however patient unable to go for scheduled EGD today given refusal of dialysis (has subsequently been consented/assented by HCP brother via telephone as detailed in GO discussion note above). Psychiatry seen and evaluated at bedside with me on 12/31, determined patient does not have capacity and HCP is brother.  EGD to be rescheduled for possibly Monday 01/4/2021.         A/P  Intractable nausea, vomiting, suspect secondary to uremia vs distal esophagitis noted on CT abdomen - ongoing  Lethargy with noted febrile episode in last 24 hours, possible sepsis with hypotension, tachycardia  Anion gap metabolic acidosis secondary to ESRD, Fs within normal appears to have tight glycemic control  Hypertensive urgency - resolved s/p dialysis, clonidine patch  ESRD on HD  Hyperphosphatemia  Hyperkalemia - resolved  Anemia secondary to ESRD  T2DM with fructosamine level of 300s, correlation with A1c of 8.8  s/p Left BKA            Plan  Last HD on 1/2  clonidine patch discontinued, standing BP medications discontinued (PRN IV BP medications still ordered)  febrile episode noted 100.4 on 1/3, pending repeat blood cultures, urinalysis with urine culture, CXR negative  GI consulted for possible distal esophagitis accompanied by nausea, vomiting, possible EGD to be rescheduled for Monday 1/4 if patient continues to be symptomatic, however currently seeming to have improved after undergoing HD.   Not a candidate for oral hypoglycemic given ESRD status. Recommend lantus 5 units at bedtime once patient able to advance diet.          Rest of plan as outlined above. MEDICAL ATTENDING COVERING DR. De La Torre      Patient seen and examined this morning. Noted to be lethargic since yesterday, rapid response called overnight given ongoing lethargy. Repeat labs do not show any significant abnormality. Of note, patient had fever of 100.4 yesterday afternoon. He has continued to have nausea, vomiting overnight, multiple episodes of brown-colored emesis. CXR performed negative for signs of aspiration pneumonia. UA with culture pending. Blood cultures pending (patient agreeable to have blood drawn with subsequent febrile episodes).  In brief, patient is a 60 M  from [facility] with PMH of ESRD on HD TTS, HTN, DM, partially blind and s/p left BKA brought to ED due to vomiting and RLQ abdominal pain found to be in metabolic derangement. has normal wbc with neutrophilia. CT A/P showing distal esophagitis, however patient unable to go for scheduled EGD today given refusal of dialysis (has subsequently been consented/assented by HCP brother via telephone as detailed in GO discussion note above). Psychiatry seen and evaluated at bedside with me on 12/31, determined patient does not have capacity and HCP is brother.  EGD to be rescheduled for possibly Monday 01/4/2021.         A/P  Intractable nausea, vomiting, suspect secondary to uremia vs distal esophagitis noted on CT abdomen - ongoing  Lethargy with noted febrile episode in last 24 hours, possible sepsis with hypotension, tachycardia  Anion gap metabolic acidosis secondary to ESRD, Fs within normal appears to have tight glycemic control  Hypertensive urgency - resolved s/p dialysis, clonidine patch  ESRD on HD  Hyperphosphatemia  Hyperkalemia - resolved  Anemia secondary to ESRD  T2DM with fructosamine level of 300s, correlation with A1c of 8.8  s/p Left BKA            Plan  Last HD on 1/2  clonidine patch discontinued, standing BP medications discontinued (PRN IV BP medications still ordered)  febrile episode noted 100.4 on 1/3, pending repeat blood cultures (patient has been refusing due to ongoing emesis, will order for AM), urinalysis with urine culture attempted to be collected, however insufficient quantity, CXR negative  GI consulted for possible distal esophagitis accompanied by nausea, vomiting, possible EGD to be rescheduled for Monday 1/4 since patient continues to be symptomatic (to be confirmed with GI on 1/3)  Not a candidate for oral hypoglycemic given ESRD status. Recommend lantus 5 units at bedtime once patient able to advance diet.  D50 pushes to be given q8h and discontinued when patient able to tolerate diet; fingersticks unable to be checked due to insufficient blood.          Rest of plan as outlined above.

## 2021-01-04 ENCOUNTER — RESULT REVIEW (OUTPATIENT)
Age: 61
End: 2021-01-04

## 2021-01-04 LAB
GLUCOSE BLDC GLUCOMTR-MCNC: 122 MG/DL — HIGH (ref 70–99)
GLUCOSE BLDC GLUCOMTR-MCNC: 180 MG/DL — HIGH (ref 70–99)
GLUCOSE BLDC GLUCOMTR-MCNC: 209 MG/DL — HIGH (ref 70–99)
GLUCOSE BLDC GLUCOMTR-MCNC: 257 MG/DL — HIGH (ref 70–99)

## 2021-01-04 PROCEDURE — 88305 TISSUE EXAM BY PATHOLOGIST: CPT | Mod: 26

## 2021-01-04 PROCEDURE — 43239 EGD BIOPSY SINGLE/MULTIPLE: CPT

## 2021-01-04 PROCEDURE — 88312 SPECIAL STAINS GROUP 1: CPT | Mod: 26

## 2021-01-04 RX ORDER — SUCRALFATE 1 G
2 TABLET ORAL EVERY 12 HOURS
Refills: 0 | Status: DISCONTINUED | OUTPATIENT
Start: 2021-01-04 | End: 2021-01-04

## 2021-01-04 RX ORDER — SUCRALFATE 1 G
1 TABLET ORAL
Refills: 0 | Status: DISCONTINUED | OUTPATIENT
Start: 2021-01-04 | End: 2021-01-07

## 2021-01-04 RX ORDER — SUCRALFATE 1 G
1 TABLET ORAL EVERY 12 HOURS
Refills: 0 | Status: DISCONTINUED | OUTPATIENT
Start: 2021-01-04 | End: 2021-01-04

## 2021-01-04 RX ORDER — ACETAMINOPHEN 500 MG
1000 TABLET ORAL ONCE
Refills: 0 | Status: COMPLETED | OUTPATIENT
Start: 2021-01-04 | End: 2021-01-04

## 2021-01-04 RX ADMIN — PANTOPRAZOLE SODIUM 40 MILLIGRAM(S): 20 TABLET, DELAYED RELEASE ORAL at 06:18

## 2021-01-04 RX ADMIN — Medication 1000 MILLIGRAM(S): at 21:47

## 2021-01-04 RX ADMIN — Medication 1 GRAM(S): at 17:51

## 2021-01-04 RX ADMIN — Medication 50 MILLILITER(S): at 15:34

## 2021-01-04 RX ADMIN — ONDANSETRON 4 MILLIGRAM(S): 8 TABLET, FILM COATED ORAL at 15:34

## 2021-01-04 RX ADMIN — Medication 1: at 21:53

## 2021-01-04 RX ADMIN — ONDANSETRON 4 MILLIGRAM(S): 8 TABLET, FILM COATED ORAL at 02:40

## 2021-01-04 RX ADMIN — Medication 50 MILLILITER(S): at 06:18

## 2021-01-04 RX ADMIN — PANTOPRAZOLE SODIUM 40 MILLIGRAM(S): 20 TABLET, DELAYED RELEASE ORAL at 17:52

## 2021-01-04 RX ADMIN — Medication 5 MILLIGRAM(S): at 06:18

## 2021-01-04 RX ADMIN — ONDANSETRON 4 MILLIGRAM(S): 8 TABLET, FILM COATED ORAL at 08:29

## 2021-01-04 RX ADMIN — Medication 2: at 08:43

## 2021-01-04 RX ADMIN — Medication 5 MILLIGRAM(S): at 15:34

## 2021-01-04 RX ADMIN — Medication 400 MILLIGRAM(S): at 20:44

## 2021-01-04 NOTE — PROGRESS NOTE ADULT - PROBLEM SELECTOR PLAN 5
- Patient on insulin at home Novolin 70/30 and SSI   - A1c is 5.9, fructosamine 400's -> 8.8 A1c   - Low BG's 1/3, poor PO intake, given D5 25mg push   - C/w SSI  - Needs tighter control of glucose on discharge, will be cautious with insulin currently due to ESRD, unstable PO intake

## 2021-01-04 NOTE — PROGRESS NOTE ADULT - SUBJECTIVE AND OBJECTIVE BOX
Esophagogastroduodenoscopy Report  Indication: N and V  Referring MD:   Instrument:  #[id number]  Anesthesia: MAC  Consent:  Informed consent was obtained from the patient after providing any opportunity for questions  Procedure: The gastroscope was gently passed through the incisoral orifice into the oral cavity and under direct visualization the esophagus was intubated. The endoscope was passed down the esophagus, through the stomach and into proximal jejunum. Color, texture, mucosa and anatomy of the esophagus, stomach, and duodenum were carefully examined with the scope. The patient tolerated the procedure well. After completion of the examination, the patient was transferred to the recovery room.   Preparation: NPO   Findings:   Oropharynx	Normal  Esophagus	grade D esophagitis of distal 15 cm of esophagus  EG-junction	Normal  Cardia	Normal.  Body	Normal   Antrum	Normal bx obtained  Pylorus	Normal.  Duodenal Bulb	Mild duodenitis  2nd portion	Normal  3rd portion	Normal.  Date and time:  EBL:0  Impression: Grade D esophagitis                    Duodenitis of bulb    Plan:   F/u path  Carafate susp 10cc qid  PPI BID                               Procedure Start Time: 10:10  Procedure End Time:  10:16       Maldonado Molina MD

## 2021-01-04 NOTE — PROGRESS NOTE ADULT - ASSESSMENT
#ESRD. HD tommorrow.  #  HTN, stable.  BP stable, off BP meds.   # renal osteodystrophy: cont phoslo and sevelamer  # anemia of CKD, Off epogen.  # nausea and vomitting. s/p endoscopy- esophagitis and duodenitis   D/C planning

## 2021-01-04 NOTE — PROGRESS NOTE ADULT - SUBJECTIVE AND OBJECTIVE BOX
[facility] : Progress Note :: [phone], (office [phone]),   Dr Mosher / Dr Washington / Dr Young / Dr Doll / Dr Varsha TURCIOS / Dr Tello / Dr Garcia / Dr Larry qiu  _____________________________________________________________________________________________    s/p endoscopy ( esophagitis/duodenitis)    fish (Rash)  liver (Anaphylaxis)  No Known Drug Allergies    Hospital Medications:   MEDICATIONS  (STANDING):  calcium acetate 667 milliGRAM(s) Oral three times a day with meals  dextrose 50% Injectable 50 milliLiter(s) IV Push <User Schedule>  folic acid 1 milliGRAM(s) Oral daily  insulin lispro (ADMELOG) corrective regimen sliding scale   SubCutaneous Before meals and at bedtime  nortriptyline 10 milliGRAM(s) Oral daily  pantoprazole  Injectable 40 milliGRAM(s) IV Push two times a day  sevelamer carbonate 1600 milliGRAM(s) Oral three times a day with meals  simvastatin 40 milliGRAM(s) Oral at bedtime  sucralfate suspension 1 Gram(s) Oral four times a day        VITALS:  T(F): 98.1 (01-04-21 @ 16:08), Max: 98.6 (01-03-21 @ 18:16)  HR: 112 (01-04-21 @ 16:08)  BP: 140/72 (01-04-21 @ 16:08)  RR: 18 (01-04-21 @ 16:08)  SpO2: 100% (01-04-21 @ 16:08)  Wt(kg): --    01-03 @ 07:01  -  01-04 @ 07:00  --------------------------------------------------------  IN: 0 mL / OUT: 0 mL / NET: 0 mL        PHYSICAL EXAM:  Constitutional: NAD  HEENT: anicteric sclera, oropharynx clear.  Neck: No JVD  Respiratory: CTAB, no wheezes, rales or rhonchi  Cardiovascular: S1, S2, RRR  Gastrointestinal: BS+, soft, NT/ND  Extremities:  No peripheral edema  Neurological: A/O x 3, no focal deficits.  Vascular Access: RUEAVF with thrill and bruit    LABS:  01-03    136  |  92<L>  |  40<H>  ----------------------------<  128<H>  3.7   |  19<L>  |  10.90<H>    Ca    8.5      03 Jan 2021 05:41  Phos  6.5     01-03  Mg     2.5     01-03    TPro  8.5<H>  /  Alb  3.9  /  TBili  0.8  /  DBili      /  AST  11  /  ALT  20  /  AlkPhos  109  01-03    Creatinine Trend: 10.90 <--, 15.10 <--, 11.60 <--, 20.50 <--, 19.10 <--, 14.40 <--                        12.9   7.68  )-----------( 145      ( 03 Jan 2021 05:41 )             42.8     Urine Studies:      RADIOLOGY & ADDITIONAL STUDIES:

## 2021-01-04 NOTE — PROGRESS NOTE ADULT - PROBLEM SELECTOR PLAN 4
- Pt BP was 201/99  - S/p metoprolol 12.5mg q12 and resumed his home medication.  - Started on Clonidine PO which pt is not tolerating due to vomiting. Switched to clonidine patch, now discontinued   - Will continue on  hydralazine IV pushes PRN  - Refused dialysis 12/29 , 12/30, got full dialysis 12/31/20, 1/2  - Will monitor BP

## 2021-01-04 NOTE — PROGRESS NOTE ADULT - PROBLEM SELECTOR PLAN 3
- Pt spiked fever of Tmax 100.4 1/2/2021, no further episodes   - No left shift/ WBC on blood work   - Pt refused blood Cx's, reported will allow if spikes a fever again  - CXR wnl, no infiltrates significant for aspiration; unremarkable lung exam   - UA to be followed after straight cath  - WIll continue to monitor for localizing cause - Pt spiked fever of Tmax 100.4 1/2/2021, no further episodes   - No left shift/ WBC on blood work   - Pt refused blood Cx's, reported will allow if spikes a fever again  - CXR wnl, no infiltrates significant for aspiration; unremarkable lung exam   - not producing urine on straight cath  - WIll continue to monitor for localizing cause; no further fevers

## 2021-01-04 NOTE — PROGRESS NOTE ADULT - PROBLEM SELECTOR PLAN 2
- Pt p/w vomiting initially Non bilious/ non bloody thought to be uremic in etiology  - Has continued to have vomiting, now coffee ground emesis   - Hb is stable around 12g/dl  - Pt is not able to tolerate PO diet  - Reduced after restarting HD, but not symptomatically relieved with HD or reglan/zofran  - CT A/P showing thickened distal esophagus suggesting esophagitis  - May be esophageal mucosal tear/ esophagitis due to intense retching  - For EGD 1/4 AM, NPO after midnight 1/3  - GI Dr. Molina - Pt p/w vomiting initially Non bilious/ non bloody thought to be uremic in etiology  - Has continued to have vomiting, now coffee ground emesis   - Hb is stable around 12g/dl  - Pt is not able to tolerate PO diet  - Reduced after restarting HD, but not symptomatically relieved with HD or reglan/zofran  - CT A/P showing thickened distal esophagus suggesting esophagitis  - May be esophageal mucosal tear/ esophagitis due to intense retching  - EGD: esophagitis and duodenitis -> started carafate and PPI BD   - GI Dr. Molina - Pt p/w vomiting initially Non bilious/ non bloody thought to be uremic in etiology  - Has continued to have vomiting, now coffee ground emesis   - Hb is stable around 12g/dl  - Pt is not able to tolerate PO diet  - Reduced after restarting HD, but not symptomatically relieved with HD or reglan/zofran  - CT A/P showing thickened distal esophagus suggesting esophagitis  - May be esophageal mucosal tear/ esophagitis due to intense retching  - EGD: esophagitis and duodenitis -> started carafate and PPI BD   - Diet clears for now, to advance to pureed if tolerated   - GI Dr. Molina

## 2021-01-04 NOTE — PROGRESS NOTE ADULT - ATTENDING COMMENTS
PATIENT SEEN AND EXAMINED. CASE D/W FLOOR TEAM. ABOVE ROS/VS/PE REVIEWED AND VERIFIED.    HPI:  Patient is a 60 year old male from [place] [address], with PMH of ESRD on HD TTS, HTN, DM, partially blind and s/p left BKA, who presented to the ED due to vomiting. Patient states that he was feeling fine until today when he started to have nausea and multiple episodes of vomiting. Patient unable to state exactly how many episodes he had. Denies any signs of blood as far as he knows. Patient also had some abdominal pain mainly located on RLQ but states it is improved now. Patient states that he has had similar episodes previously and has gone to hospital but unsure exactly about what is the cause of symptoms. Patient states he normally has HD sessions on T/T/S but due to holidays, schedule has been slightly changed and had last session on Friday. Patient states he was supposed to have HD today but did not go due to feeling sick. Currently, patient denies any chest pain, shortness of breath, headache, dizziness or abdominal pain.     PLAN:    # DYSPHAGIA W/ RECURRENT N/V S/T GRADE D ESOPHAGITIS AND DUODENITIS S/P EGD 1/4 - ON PROTONIX 40MG BID, CARAFATE QID, CLEAR LIQUID DIET TO BE ADVANCED AS TOLERATED, GASTROENTEROLOGY CONSULT IN PROGRESS  # FEBRILE EPISODE - RESOLVED - MONITOR BLOOD CULTURES, MENTAL STATUS, VITALS  # ESRD ON HD TTS  # HTN  # DM - MONITORING BG [LABILE DUE TO POOR PO INTAKE], WILL ADJUST INSULIN PRIOR TO DISCHARGE  # PARTIALLY BLIND  # S/P LEFT BKA  # GI AND DVT PPX    SHAKIR MAC MD COVERING KUSH MAC MD

## 2021-01-04 NOTE — PROGRESS NOTE ADULT - PROBLEM SELECTOR PLAN 8
Was on heparin SQ, held due to possible hematemesis, for EGD 1/4 AM   IMPROVE VTE Individual Risk Assessment  RISK                                                                Points  [  ] Previous VTE                                                  3  [  ] Thrombophilia                                               2  [  ] Lower limb paralysis                                      2  [  ] Current Cancer                                              2   [ x ] Immobilization > 24 hrs                                1  [  ] ICU/CCU stay > 24 hours                              1  [  ] Age > 60                                                      1  IMPROVE VTE Score ___1______ Was on heparin SQ, held due to possible hematemesis  IMPROVE VTE Individual Risk Assessment  RISK                                                                Points  [  ] Previous VTE                                                  3  [  ] Thrombophilia                                               2  [  ] Lower limb paralysis                                      2  [  ] Current Cancer                                              2   [ x ] Immobilization > 24 hrs                                1  [  ] ICU/CCU stay > 24 hours                              1  [  ] Age > 60                                                      1  IMPROVE VTE Score ___1______

## 2021-01-04 NOTE — PROGRESS NOTE ADULT - PROBLEM SELECTOR PLAN 1
- Patient on HD T/T/S   - HD12/28 incomplete, refused 12/19, last 12/31, 1/1  - Phosphorus is 6.6 -> 6.4 :    C/w phoslo and monitor  - Hyperkalemia of 5.5 on adm-> 4.0mg/dl 1/1   - High anion gap metabolic acidosis due to ESRD (AG of 20)  - Normal Bicarb and VBG was normal  - Nephro Dr. Mosher  - Continue renvela   - Monitor BMP daily   - SW consult

## 2021-01-04 NOTE — PROGRESS NOTE ADULT - SUBJECTIVE AND OBJECTIVE BOX
PGY-1 Progress Note discussed with attending    PAGER #: [[phone]] TILL 5:00 PM  PLEASE CONTACT ON CALL TEAM:  - On Call Team (Please refer to Dejon) FROM 5:00 PM - 8:30PM  - Nightfloat Team FROM 8:30 -7:30 AM    CHIEF COMPLAINT & BRIEF HOSPITAL COURSE:    INTERVAL HPI/OVERNIGHT EVENTS:       REVIEW OF SYSTEMS:  CONSTITUTIONAL: No fever, weight loss, or fatigue  RESPIRATORY: No cough, wheezing, chills or hemoptysis; No shortness of breath  CARDIOVASCULAR: No chest pain, palpitations, dizziness, or leg swelling  GASTROINTESTINAL: No abdominal pain. No nausea, vomiting, or hematemesis; No diarrhea or constipation. No melena or hematochezia.  GENITOURINARY: No dysuria or hematuria, urinary frequency  NEUROLOGICAL: No headaches, memory loss, loss of strength, numbness, or tremors  SKIN: No itching, burning, rashes, or lesions     MEDICATIONS  (STANDING):  calcium acetate 667 milliGRAM(s) Oral three times a day with meals  dextrose 50% Injectable 50 milliLiter(s) IV Push <User Schedule>  folic acid 1 milliGRAM(s) Oral daily  insulin lispro (ADMELOG) corrective regimen sliding scale   SubCutaneous Before meals and at bedtime  nortriptyline 10 milliGRAM(s) Oral daily  pantoprazole  Injectable 40 milliGRAM(s) IV Push two times a day  sevelamer carbonate 1600 milliGRAM(s) Oral three times a day with meals  simvastatin 40 milliGRAM(s) Oral at bedtime    MEDICATIONS  (PRN):  acetaminophen   Tablet .. 650 milliGRAM(s) Oral every 6 hours PRN Temp greater or equal to 38C (100.4F), Moderate Pain (4 - 6)  guaiFENesin   Syrup  (Sugar-Free) 100 milliGRAM(s) Oral every 6 hours PRN Cough  hydrALAZINE Injectable 5 milliGRAM(s) IV Push every 8 hours PRN SBP >160  metoclopramide Injectable 5 milliGRAM(s) IV Push every 8 hours PRN Vomiting  ondansetron Injectable 4 milliGRAM(s) IV Push every 6 hours PRN Nausea and/or Vomiting      Vital Signs Last 24 Hrs  T(C): 36.8 (04 Jan 2021 08:14), Max: 37 (03 Jan 2021 18:16)  T(F): 98.3 (04 Jan 2021 08:14), Max: 98.6 (03 Jan 2021 18:16)  HR: 112 (04 Jan 2021 08:14) (107 - 112)  BP: 125/68 (04 Jan 2021 08:14) (125/68 - 151/69)  BP(mean): --  RR: 18 (04 Jan 2021 08:14) (16 - 18)  SpO2: 100% (04 Jan 2021 08:14) (94% - 100%)    PHYSICAL EXAMINATION:  GENERAL: NAD, well built  HEAD:  Atraumatic, Normocephalic  EYES:  conjunctiva and sclera clear  NECK: Supple, No JVD, Normal thyroid  CHEST/LUNG: Clear to auscultation. Clear to percussion bilaterally; No rales, rhonchi, wheezing, or rubs  HEART: Regular rate and rhythm; No murmurs, rubs, or gallops  ABDOMEN: Soft, Nontender, Nondistended; Bowel sounds present  NERVOUS SYSTEM:  Alert & Oriented X3,    EXTREMITIES:  2+ Peripheral Pulses, No clubbing, cyanosis, or edema  SKIN: warm dry                          12.9   7.68  )-----------( 145      ( 03 Jan 2021 05:41 )             42.8     01-03    136  |  92<L>  |  40<H>  ----------------------------<  128<H>  3.7   |  19<L>  |  10.90<H>    Ca    8.5      03 Jan 2021 05:41  Phos  6.5     01-03  Mg     2.5     01-03    TPro  8.5<H>  /  Alb  3.9  /  TBili  0.8  /  DBili  x   /  AST  11  /  ALT  20  /  AlkPhos  109  01-03    LIVER FUNCTIONS - ( 03 Jan 2021 05:41 )  Alb: 3.9 g/dL / Pro: 8.5 g/dL / ALK PHOS: 109 U/L / ALT: 20 U/L DA / AST: 11 U/L / GGT: x                   CAPILLARY BLOOD GLUCOSE      RADIOLOGY & ADDITIONAL TESTS:                   PGY-1 Progress Note discussed with attending    PAGER #: [[phone]] TILL 5:00 PM  PLEASE CONTACT ON CALL TEAM:  - On Call Team (Please refer to Dejon) FROM 5:00 PM - 8:30PM  - Nightfloat Team FROM 8:30 -7:30 AM    CHIEF COMPLAINT & BRIEF HOSPITAL COURSE:  Patient is a 60M from [place] [address], with PMH of ESRD on HD TTS, HTN, DM, partially blind and s/p left BKA, who presented to the ED due to vomiting. Patient states that he was feeling fine until today when he started to have nausea and multiple episodes of vomiting. Patient unable to state exactly how many episodes he had. Denies any signs of blood as far as he knows. Patient also had some abdominal pain mainly located on RLQ but states it is improved now. Patient states that he has had similar episodes previously and has gone to hospital but unsure exactly about what is the cause of symptoms. Patient states he normally has HD sessions on T/T/S but it was changed due to the holidays, and he missed another session due to feeling sick. Currently, patient denies any chest pain, shortness of breath, headache, dizziness or abdominal pain. He was admitted for management of intractable vomiting which is likely 2/2 to diabetic gastroparesis vs electrolyte derangements. He was mildly improved on Reglan, and was not able to tolerate the entire session of dialysis 12/28. After this, he refused AM meds and blood draws, and his BP remained elevated due to rebound HTN. Clonidine PO was made patch and labetalol 5mg push given with hydralazine PRN pushes. Pt now has IV access, clonidine patch has been D'christy and BP is better as he has restarted full sessions of HD (last UF removed as pt volume depleted). His brother and HCP (Georgi newman) was contacted and reaffirmed [facility] as full code status.     INTERVAL HPI/OVERNIGHT EVENTS:   Patient was examined while he was lying in bed, Aox1-2, not responding to questions, in NAD. He has normal bowel movements, but is not making any urine (was straight cathed and did not produce anything). He went for EGD today as he continued to vomit coffee ground emesis with no significant drop in Hb. EGD found esophagitis and duodenitis and he was started on carafate and PPI BD.     REVIEW OF SYSTEMS:  CONSTITUTIONAL: Fatigue + No fever, weight loss  RESPIRATORY: No cough, wheezing, chills or hemoptysis; No shortness of breath  CARDIOVASCULAR: No chest pain, palpitations, dizziness, or leg swelling  GASTROINTESTINAL: Coffee ground emesis, retching+ No abdominal pain; No diarrhea or constipation. No melena or hematochezia.  GENITOURINARY: No dysuria or hematuria, urinary frequency  NEUROLOGICAL: No headaches, memory loss, loss of strength, numbness, or tremors  SKIN: No itching, burning, rashes, or lesions     MEDICATIONS  (STANDING):  calcium acetate 667 milliGRAM(s) Oral three times a day with meals  dextrose 50% Injectable 50 milliLiter(s) IV Push <User Schedule>  folic acid 1 milliGRAM(s) Oral daily  insulin lispro (ADMELOG) corrective regimen sliding scale   SubCutaneous Before meals and at bedtime  nortriptyline 10 milliGRAM(s) Oral daily  pantoprazole  Injectable 40 milliGRAM(s) IV Push two times a day  sevelamer carbonate 1600 milliGRAM(s) Oral three times a day with meals  simvastatin 40 milliGRAM(s) Oral at bedtime    MEDICATIONS  (PRN):  acetaminophen   Tablet .. 650 milliGRAM(s) Oral every 6 hours PRN Temp greater or equal to 38C (100.4F), Moderate Pain (4 - 6)  guaiFENesin   Syrup  (Sugar-Free) 100 milliGRAM(s) Oral every 6 hours PRN Cough  hydrALAZINE Injectable 5 milliGRAM(s) IV Push every 8 hours PRN SBP >160  metoclopramide Injectable 5 milliGRAM(s) IV Push every 8 hours PRN Vomiting  ondansetron Injectable 4 milliGRAM(s) IV Push every 6 hours PRN Nausea and/or Vomiting      Vital Signs Last 24 Hrs  T(C): 36.8 (04 Jan 2021 08:14), Max: 37 (03 Jan 2021 18:16)  T(F): 98.3 (04 Jan 2021 08:14), Max: 98.6 (03 Jan 2021 18:16)  HR: 112 (04 Jan 2021 08:14) (107 - 112)  BP: 125/68 (04 Jan 2021 08:14) (125/68 - 151/69)  BP(mean): --  RR: 18 (04 Jan 2021 08:14) (16 - 18)  SpO2: 100% (04 Jan 2021 08:14) (94% - 100%)    PHYSICAL EXAMINATION:  GENERAL: Thinly built AAM, in NAD   HEAD:  Atraumatic, Normocephalic  EYES:  conjunctiva and sclera clear  NECK: Supple, No JVD  CHEST/LUNG: Clear to auscultation.  No rales, rhonchi, wheezing, or rubs  HEART: Regular rate and rhythm; No murmurs, rubs, or gallops  ABDOMEN: Soft, Nontender, Nondistended; Bowel sounds increased, Coffee ground emesis in vomit pan  NERVOUS SYSTEM:  Alert but confused, Oriented X2, No motor or sensory deficits   EXTREMITIES: R AVF in place 2+ Peripheral Pulses, No clubbing, cyanosis, or edema  SKIN: warm dry                          12.9   7.68  )-----------( 145      ( 03 Jan 2021 05:41 )             42.8     01-03    136  |  92<L>  |  40<H>  ----------------------------<  128<H>  3.7   |  19<L>  |  10.90<H>    Ca    8.5      03 Jan 2021 05:41  Phos  6.5     01-03  Mg     2.5     01-03    TPro  8.5<H>  /  Alb  3.9  /  TBili  0.8  /  DBili  x   /  AST  11  /  ALT  20  /  AlkPhos  109  01-03    LIVER FUNCTIONS - ( 03 Jan 2021 05:41 )  Alb: 3.9 g/dL / Pro: 8.5 g/dL / ALK PHOS: 109 U/L / ALT: 20 U/L DA / AST: 11 U/L / GGT: x                   CAPILLARY BLOOD GLUCOSE      RADIOLOGY & ADDITIONAL TESTS:                   PGY-1 Progress Note discussed with attending    PAGER #: [[phone]] TILL 5:00 PM  PLEASE CONTACT ON CALL TEAM:  - On Call Team (Please refer to Dejon) FROM 5:00 PM - 8:30PM  - Nightfloat Team FROM 8:30 -7:30 AM    CHIEF COMPLAINT & BRIEF HOSPITAL COURSE:  Patient is a 60M from [place] [address], with PMH of ESRD on HD TTS, HTN, DM, partially blind and s/p left BKA, who presented to the ED due to vomiting. Patient states that he was feeling fine until today when he started to have nausea and multiple episodes of vomiting. Patient unable to state exactly how many episodes he had. Denies any signs of blood as far as he knows. Patient also had some abdominal pain mainly located on RLQ but states it is improved now. Patient states that he has had similar episodes previously and has gone to hospital but unsure exactly about what is the cause of symptoms. Patient states he normally has HD sessions on T/T/S but it was changed due to the holidays, and he missed another session due to feeling sick. Currently, patient denies any chest pain, shortness of breath, headache, dizziness or abdominal pain. He was admitted for management of intractable vomiting which is likely 2/2 to diabetic gastroparesis vs electrolyte derangements. He was mildly improved on Reglan, and was not able to tolerate the entire session of dialysis 12/28. After this, he refused AM meds and blood draws, and his BP remained elevated due to rebound HTN. Clonidine PO was made patch and labetalol 5mg push given with hydralazine PRN pushes. Pt now has IV access, clonidine patch has been D'christy and BP is better as he has restarted full sessions of HD (last UF removed as pt volume depleted). His brother and HCP (Georgi newman) was contacted and reaffirmed [facility] as full code status.     INTERVAL HPI/OVERNIGHT EVENTS:   Patient was examined while he was lying in bed, Aox1-2, not responding to questions, in NAD. He has normal bowel movements, but is not making any urine (was straight cathed and did not produce anything). He went for EGD today as he continued to vomit coffee ground emesis with no significant drop in Hb. EGD found esophagitis and duodenitis and he was started on carafate and PPI BD. Planned for discharge tmrr after dialysis.     REVIEW OF SYSTEMS:  CONSTITUTIONAL: Fatigue + No fever, weight loss  RESPIRATORY: No cough, wheezing, chills or hemoptysis; No shortness of breath  CARDIOVASCULAR: No chest pain, palpitations, dizziness, or leg swelling  GASTROINTESTINAL: Coffee ground emesis, retching+ No abdominal pain; No diarrhea or constipation. No melena or hematochezia.  GENITOURINARY: No dysuria or hematuria, urinary frequency  NEUROLOGICAL: No headaches, memory loss, loss of strength, numbness, or tremors  SKIN: No itching, burning, rashes, or lesions     MEDICATIONS  (STANDING):  calcium acetate 667 milliGRAM(s) Oral three times a day with meals  dextrose 50% Injectable 50 milliLiter(s) IV Push <User Schedule>  folic acid 1 milliGRAM(s) Oral daily  insulin lispro (ADMELOG) corrective regimen sliding scale   SubCutaneous Before meals and at bedtime  nortriptyline 10 milliGRAM(s) Oral daily  pantoprazole  Injectable 40 milliGRAM(s) IV Push two times a day  sevelamer carbonate 1600 milliGRAM(s) Oral three times a day with meals  simvastatin 40 milliGRAM(s) Oral at bedtime    MEDICATIONS  (PRN):  acetaminophen   Tablet .. 650 milliGRAM(s) Oral every 6 hours PRN Temp greater or equal to 38C (100.4F), Moderate Pain (4 - 6)  guaiFENesin   Syrup  (Sugar-Free) 100 milliGRAM(s) Oral every 6 hours PRN Cough  hydrALAZINE Injectable 5 milliGRAM(s) IV Push every 8 hours PRN SBP >160  metoclopramide Injectable 5 milliGRAM(s) IV Push every 8 hours PRN Vomiting  ondansetron Injectable 4 milliGRAM(s) IV Push every 6 hours PRN Nausea and/or Vomiting      Vital Signs Last 24 Hrs  T(C): 36.8 (04 Jan 2021 08:14), Max: 37 (03 Jan 2021 18:16)  T(F): 98.3 (04 Jan 2021 08:14), Max: 98.6 (03 Jan 2021 18:16)  HR: 112 (04 Jan 2021 08:14) (107 - 112)  BP: 125/68 (04 Jan 2021 08:14) (125/68 - 151/69)  BP(mean): --  RR: 18 (04 Jan 2021 08:14) (16 - 18)  SpO2: 100% (04 Jan 2021 08:14) (94% - 100%)    PHYSICAL EXAMINATION:  GENERAL: Thinly built AAM, in NAD   HEAD:  Atraumatic, Normocephalic  EYES:  conjunctiva and sclera clear  NECK: Supple, No JVD  CHEST/LUNG: Clear to auscultation.  No rales, rhonchi, wheezing, or rubs  HEART: Regular rate and rhythm; No murmurs, rubs, or gallops  ABDOMEN: Soft, Nontender, Nondistended; Bowel sounds increased, Coffee ground emesis in vomit pan  NERVOUS SYSTEM:  Alert but confused, Oriented X2, No motor or sensory deficits   EXTREMITIES: R AVF in place 2+ Peripheral Pulses, No clubbing, cyanosis, or edema  SKIN: warm dry                          12.9   7.68  )-----------( 145      ( 03 Jan 2021 05:41 )             42.8     01-03    136  |  92<L>  |  40<H>  ----------------------------<  128<H>  3.7   |  19<L>  |  10.90<H>    Ca    8.5      03 Jan 2021 05:41  Phos  6.5     01-03  Mg     2.5     01-03    TPro  8.5<H>  /  Alb  3.9  /  TBili  0.8  /  DBili  x   /  AST  11  /  ALT  20  /  AlkPhos  109  01-03    LIVER FUNCTIONS - ( 03 Jan 2021 05:41 )  Alb: 3.9 g/dL / Pro: 8.5 g/dL / ALK PHOS: 109 U/L / ALT: 20 U/L DA / AST: 11 U/L / GGT: x                   CAPILLARY BLOOD GLUCOSE      RADIOLOGY & ADDITIONAL TESTS:

## 2021-01-04 NOTE — PROGRESS NOTE ADULT - ASSESSMENT
Patient is a 60 year old male from [place] [address], with PMH of ESRD on HD TTS, HTN, DM, partially blind and s/p left BKA, who presented with vomiting, and was admitted for dialysis and work-up of possible esophagitis vs uremia vs diabetic gastroparesis.     Hgb of 12.6. Potassium of 7.3 with repeat of 5.9. Creatinine of 19.80. COVID negative. CT abdomen showing thickened distal esophagus. Mild diffuse bladder wall thickening. Given NS bolus, LR bolus, lokelma, reglan and zofran in ED.

## 2021-01-05 DIAGNOSIS — K20.90 ESOPHAGITIS, UNSPECIFIED WITHOUT BLEEDING: ICD-10-CM

## 2021-01-05 LAB
ALBUMIN SERPL ELPH-MCNC: 3.3 G/DL — LOW (ref 3.5–5)
ALP SERPL-CCNC: 86 U/L — SIGNIFICANT CHANGE UP (ref 40–120)
ALT FLD-CCNC: 14 U/L DA — SIGNIFICANT CHANGE UP (ref 10–60)
ANION GAP SERPL CALC-SCNC: 18 MMOL/L — HIGH (ref 5–17)
AST SERPL-CCNC: <3 U/L — LOW (ref 10–40)
BASOPHILS # BLD AUTO: 0.01 K/UL — SIGNIFICANT CHANGE UP (ref 0–0.2)
BASOPHILS NFR BLD AUTO: 0.2 % — SIGNIFICANT CHANGE UP (ref 0–2)
BILIRUB SERPL-MCNC: 0.6 MG/DL — SIGNIFICANT CHANGE UP (ref 0.2–1.2)
BUN SERPL-MCNC: 77 MG/DL — HIGH (ref 7–18)
CALCIUM SERPL-MCNC: 7.2 MG/DL — LOW (ref 8.4–10.5)
CHLORIDE SERPL-SCNC: 94 MMOL/L — LOW (ref 96–108)
CO2 SERPL-SCNC: 26 MMOL/L — SIGNIFICANT CHANGE UP (ref 22–31)
CREAT SERPL-MCNC: 18.9 MG/DL — HIGH (ref 0.5–1.3)
EOSINOPHIL # BLD AUTO: 0.03 K/UL — SIGNIFICANT CHANGE UP (ref 0–0.5)
EOSINOPHIL NFR BLD AUTO: 0.6 % — SIGNIFICANT CHANGE UP (ref 0–6)
GLUCOSE BLDC GLUCOMTR-MCNC: 104 MG/DL — HIGH (ref 70–99)
GLUCOSE BLDC GLUCOMTR-MCNC: 128 MG/DL — HIGH (ref 70–99)
GLUCOSE BLDC GLUCOMTR-MCNC: 147 MG/DL — HIGH (ref 70–99)
GLUCOSE BLDC GLUCOMTR-MCNC: 156 MG/DL — HIGH (ref 70–99)
GLUCOSE SERPL-MCNC: 125 MG/DL — HIGH (ref 70–99)
HCT VFR BLD CALC: 37.7 % — LOW (ref 39–50)
HGB BLD-MCNC: 11.7 G/DL — LOW (ref 13–17)
IMM GRANULOCYTES NFR BLD AUTO: 0.2 % — SIGNIFICANT CHANGE UP (ref 0–1.5)
LYMPHOCYTES # BLD AUTO: 0.55 K/UL — LOW (ref 1–3.3)
LYMPHOCYTES # BLD AUTO: 11.3 % — LOW (ref 13–44)
MAGNESIUM SERPL-MCNC: 2.6 MG/DL — SIGNIFICANT CHANGE UP (ref 1.6–2.6)
MCHC RBC-ENTMCNC: 28.3 PG — SIGNIFICANT CHANGE UP (ref 27–34)
MCHC RBC-ENTMCNC: 31 GM/DL — LOW (ref 32–36)
MCV RBC AUTO: 91.1 FL — SIGNIFICANT CHANGE UP (ref 80–100)
MONOCYTES # BLD AUTO: 0.55 K/UL — SIGNIFICANT CHANGE UP (ref 0–0.9)
MONOCYTES NFR BLD AUTO: 11.3 % — SIGNIFICANT CHANGE UP (ref 2–14)
NEUTROPHILS # BLD AUTO: 3.73 K/UL — SIGNIFICANT CHANGE UP (ref 1.8–7.4)
NEUTROPHILS NFR BLD AUTO: 76.4 % — SIGNIFICANT CHANGE UP (ref 43–77)
NRBC # BLD: 0 /100 WBCS — SIGNIFICANT CHANGE UP (ref 0–0)
PHOSPHATE SERPL-MCNC: 8.2 MG/DL — HIGH (ref 2.5–4.5)
PLATELET # BLD AUTO: 161 K/UL — SIGNIFICANT CHANGE UP (ref 150–400)
POTASSIUM SERPL-MCNC: 3.9 MMOL/L — SIGNIFICANT CHANGE UP (ref 3.5–5.3)
POTASSIUM SERPL-SCNC: 3.9 MMOL/L — SIGNIFICANT CHANGE UP (ref 3.5–5.3)
PROT SERPL-MCNC: 7.1 G/DL — SIGNIFICANT CHANGE UP (ref 6–8.3)
RBC # BLD: 4.14 M/UL — LOW (ref 4.2–5.8)
RBC # FLD: 18.3 % — HIGH (ref 10.3–14.5)
SODIUM SERPL-SCNC: 138 MMOL/L — SIGNIFICANT CHANGE UP (ref 135–145)
WBC # BLD: 4.88 K/UL — SIGNIFICANT CHANGE UP (ref 3.8–10.5)
WBC # FLD AUTO: 4.88 K/UL — SIGNIFICANT CHANGE UP (ref 3.8–10.5)

## 2021-01-05 PROCEDURE — 99232 SBSQ HOSP IP/OBS MODERATE 35: CPT

## 2021-01-05 RX ORDER — ACETAMINOPHEN 500 MG
1000 TABLET ORAL ONCE
Refills: 0 | Status: COMPLETED | OUTPATIENT
Start: 2021-01-05 | End: 2021-01-05

## 2021-01-05 RX ORDER — OXYCODONE AND ACETAMINOPHEN 5; 325 MG/1; MG/1
2 TABLET ORAL EVERY 8 HOURS
Refills: 0 | Status: DISCONTINUED | OUTPATIENT
Start: 2021-01-05 | End: 2021-01-05

## 2021-01-05 RX ORDER — TRAMADOL HYDROCHLORIDE 50 MG/1
50 TABLET ORAL EVERY 8 HOURS
Refills: 0 | Status: COMPLETED | OUTPATIENT
Start: 2021-01-05 | End: 2021-01-07

## 2021-01-05 RX ADMIN — PANTOPRAZOLE SODIUM 40 MILLIGRAM(S): 20 TABLET, DELAYED RELEASE ORAL at 06:05

## 2021-01-05 RX ADMIN — Medication 400 MILLIGRAM(S): at 06:02

## 2021-01-05 RX ADMIN — TRAMADOL HYDROCHLORIDE 50 MILLIGRAM(S): 50 TABLET ORAL at 22:23

## 2021-01-05 RX ADMIN — Medication 1 GRAM(S): at 12:48

## 2021-01-05 RX ADMIN — ONDANSETRON 4 MILLIGRAM(S): 8 TABLET, FILM COATED ORAL at 21:10

## 2021-01-05 RX ADMIN — SIMVASTATIN 40 MILLIGRAM(S): 20 TABLET, FILM COATED ORAL at 21:18

## 2021-01-05 RX ADMIN — ONDANSETRON 4 MILLIGRAM(S): 8 TABLET, FILM COATED ORAL at 15:00

## 2021-01-05 RX ADMIN — Medication 1 GRAM(S): at 21:18

## 2021-01-05 RX ADMIN — ONDANSETRON 4 MILLIGRAM(S): 8 TABLET, FILM COATED ORAL at 08:18

## 2021-01-05 RX ADMIN — TRAMADOL HYDROCHLORIDE 50 MILLIGRAM(S): 50 TABLET ORAL at 21:18

## 2021-01-05 RX ADMIN — Medication 1000 MILLIGRAM(S): at 06:45

## 2021-01-05 NOTE — PROGRESS NOTE ADULT - ASSESSMENT
S/p EGD yesterday finding grade D esophagitis. As per RN patient refused carafate. Patient advised that if he does not take carafate his symptoms will not improve. Patient verbalized understanding and stated he will take carafate.

## 2021-01-05 NOTE — PROGRESS NOTE ADULT - SUBJECTIVE AND OBJECTIVE BOX
PGY-1 Progress Note discussed with attending    PAGER #: [[phone]] TILL 5:00 PM  PLEASE CONTACT ON CALL TEAM:  - On Call Team (Please refer to Dejon) FROM 5:00 PM - 8:30PM  - Nightfloat Team FROM 8:30 -7:30 AM    CHIEF COMPLAINT & BRIEF HOSPITAL COURSE:    INTERVAL HPI/OVERNIGHT EVENTS:       REVIEW OF SYSTEMS:  CONSTITUTIONAL: No fever, weight loss, or fatigue  RESPIRATORY: No cough, wheezing, chills or hemoptysis; No shortness of breath  CARDIOVASCULAR: No chest pain, palpitations, dizziness, or leg swelling  GASTROINTESTINAL: No abdominal pain. No nausea, vomiting, or hematemesis; No diarrhea or constipation. No melena or hematochezia.  GENITOURINARY: No dysuria or hematuria, urinary frequency  NEUROLOGICAL: No headaches, memory loss, loss of strength, numbness, or tremors  SKIN: No itching, burning, rashes, or lesions     MEDICATIONS  (STANDING):  calcium acetate 667 milliGRAM(s) Oral three times a day with meals  dextrose 50% Injectable 50 milliLiter(s) IV Push <User Schedule>  folic acid 1 milliGRAM(s) Oral daily  insulin lispro (ADMELOG) corrective regimen sliding scale   SubCutaneous Before meals and at bedtime  nortriptyline 10 milliGRAM(s) Oral daily  pantoprazole  Injectable 40 milliGRAM(s) IV Push two times a day  sevelamer carbonate 1600 milliGRAM(s) Oral three times a day with meals  simvastatin 40 milliGRAM(s) Oral at bedtime  sucralfate suspension 1 Gram(s) Oral four times a day    MEDICATIONS  (PRN):  acetaminophen   Tablet .. 650 milliGRAM(s) Oral every 6 hours PRN Temp greater or equal to 38C (100.4F), Moderate Pain (4 - 6)  guaiFENesin   Syrup  (Sugar-Free) 100 milliGRAM(s) Oral every 6 hours PRN Cough  hydrALAZINE Injectable 5 milliGRAM(s) IV Push every 8 hours PRN SBP >160  metoclopramide Injectable 5 milliGRAM(s) IV Push every 8 hours PRN Vomiting  ondansetron Injectable 4 milliGRAM(s) IV Push every 6 hours PRN Nausea and/or Vomiting      Vital Signs Last 24 Hrs  T(C): 36.5 (05 Jan 2021 09:14), Max: 36.8 (04 Jan 2021 23:46)  T(F): 97.7 (05 Jan 2021 09:14), Max: 98.3 (04 Jan 2021 23:46)  HR: 79 (05 Jan 2021 09:14) (79 - 112)  BP: 142/79 (05 Jan 2021 09:14) (127/62 - 148/92)  BP(mean): --  RR: 16 (05 Jan 2021 09:14) (16 - 18)  SpO2: 96% (05 Jan 2021 09:14) (96% - 100%)    PHYSICAL EXAMINATION:  GENERAL: NAD, well built  HEAD:  Atraumatic, Normocephalic  EYES:  conjunctiva and sclera clear  NECK: Supple, No JVD, Normal thyroid  CHEST/LUNG: Clear to auscultation. Clear to percussion bilaterally; No rales, rhonchi, wheezing, or rubs  HEART: Regular rate and rhythm; No murmurs, rubs, or gallops  ABDOMEN: Soft, Nontender, Nondistended; Bowel sounds present  NERVOUS SYSTEM:  Alert & Oriented X3,    EXTREMITIES:  2+ Peripheral Pulses, No clubbing, cyanosis, or edema  SKIN: warm dry                      CAPILLARY BLOOD GLUCOSE      RADIOLOGY & ADDITIONAL TESTS:                   PGY-1 Progress Note discussed with attending    PAGER #: [[phone]] TILL 5:00 PM  PLEASE CONTACT ON CALL TEAM:  - On Call Team (Please refer to Dejon) FROM 5:00 PM - 8:30PM  - Nightfloat Team FROM 8:30 -7:30 AM    CHIEF COMPLAINT & BRIEF HOSPITAL COURSE:  Patient is a 60M from [place] [address], with PMH of ESRD on HD TTS, HTN, DM, partially blind and s/p left BKA, who presented to the ED due to vomiting. Patient states that he was feeling fine until today when he started to have nausea and multiple episodes of vomiting. Patient unable to state exactly how many episodes he had. Denies any signs of blood as far as he knows. Patient also had some abdominal pain mainly located on RLQ but states it is improved now. Patient states that he has had similar episodes previously and has gone to hospital but unsure exactly about what is the cause of symptoms. Patient states he normally has HD sessions on T/T/S but it was changed due to the holidays, and he missed another session due to feeling sick. Currently, patient denies any chest pain, shortness of breath, headache, dizziness or abdominal pain. He was admitted for management of intractable vomiting which is likely 2/2 to diabetic gastroparesis vs electrolyte derangements. He was mildly improved on Reglan, and was not able to tolerate the entire session of dialysis 12/28. After this, he refused AM meds and blood draws, and his BP remained elevated due to rebound HTN. Clonidine PO was made patch and labetalol 5mg push given with hydralazine PRN pushes. Pt now has IV access, clonidine patch has been D'christy and BP is better as he has restarted full sessions of HD (last UF removed as pt volume depleted). His brother and HCP (Georgi King) was contacted and reaffirmed [facility] as full code status.     INTERVAL HPI/OVERNIGHT EVENTS:   Patient was examined while he was lying in bed, Aox1-2, not responding to questions, in better spirits/ less symptomatic than yesterday. He has normal bowel movements, is anuric, and is having improved nausea and vomiting. He is continued on carafate and PPI BD. For dialysis today, and is barely able to tolerate clears. Pt will be observed until abdominal pain and diet normalize.        REVIEW OF SYSTEMS:  CONSTITUTIONAL: Fatigue + No fever, weight loss  RESPIRATORY: No cough, wheezing, chills or hemoptysis; No shortness of breath  CARDIOVASCULAR: No chest pain, palpitations, dizziness, or leg swelling  GASTROINTESTINAL: bilious emesis, retching+ No abdominal pain; No diarrhea or constipation. No melena or hematochezia.  GENITOURINARY: No dysuria or hematuria, urinary frequency  NEUROLOGICAL: No headaches, memory loss, loss of strength, numbness, or tremors  SKIN: No itching, burning, rashes, or lesions     MEDICATIONS  (STANDING):  calcium acetate 667 milliGRAM(s) Oral three times a day with meals  dextrose 50% Injectable 50 milliLiter(s) IV Push <User Schedule>  folic acid 1 milliGRAM(s) Oral daily  insulin lispro (ADMELOG) corrective regimen sliding scale   SubCutaneous Before meals and at bedtime  nortriptyline 10 milliGRAM(s) Oral daily  pantoprazole  Injectable 40 milliGRAM(s) IV Push two times a day  sevelamer carbonate 1600 milliGRAM(s) Oral three times a day with meals  simvastatin 40 milliGRAM(s) Oral at bedtime  sucralfate suspension 1 Gram(s) Oral four times a day    MEDICATIONS  (PRN):  acetaminophen   Tablet .. 650 milliGRAM(s) Oral every 6 hours PRN Temp greater or equal to 38C (100.4F), Moderate Pain (4 - 6)  guaiFENesin   Syrup  (Sugar-Free) 100 milliGRAM(s) Oral every 6 hours PRN Cough  hydrALAZINE Injectable 5 milliGRAM(s) IV Push every 8 hours PRN SBP >160  metoclopramide Injectable 5 milliGRAM(s) IV Push every 8 hours PRN Vomiting  ondansetron Injectable 4 milliGRAM(s) IV Push every 6 hours PRN Nausea and/or Vomiting      Vital Signs Last 24 Hrs  T(C): 36.5 (05 Jan 2021 09:14), Max: 36.8 (04 Jan 2021 23:46)  T(F): 97.7 (05 Jan 2021 09:14), Max: 98.3 (04 Jan 2021 23:46)  HR: 79 (05 Jan 2021 09:14) (79 - 112)  BP: 142/79 (05 Jan 2021 09:14) (127/62 - 148/92)  BP(mean): --  RR: 16 (05 Jan 2021 09:14) (16 - 18)  SpO2: 96% (05 Jan 2021 09:14) (96% - 100%)    PHYSICAL EXAMINATION:  GENERAL: Thinly built AAM, in NAD   HEAD:  Atraumatic, Normocephalic  EYES:  conjunctiva and sclera clear  NECK: Supple, No JVD  CHEST/LUNG: Clear to auscultation.  No rales, rhonchi, wheezing, or rubs  HEART: Regular rate and rhythm; No murmurs, rubs, or gallops  ABDOMEN: Soft, Nontender, Nondistended; Bowel sounds increased, bilious emesis in vomit pan  NERVOUS SYSTEM:  Alert but confused, Oriented X2, No motor or sensory deficits   EXTREMITIES: R AVF in place 2+ Peripheral Pulses, No clubbing, cyanosis, or edema  SKIN: warm dry                      CAPILLARY BLOOD GLUCOSE      RADIOLOGY & ADDITIONAL TESTS:                   PGY-1 Progress Note discussed with attending    PAGER #: [[phone]] TILL 5:00 PM  PLEASE CONTACT ON CALL TEAM:  - On Call Team (Please refer to Dejon) FROM 5:00 PM - 8:30PM  - Nightfloat Team FROM 8:30 -7:30 AM    CHIEF COMPLAINT & BRIEF HOSPITAL COURSE:  Patient is a 60M from [place] [address], with PMH of ESRD on HD TTS, HTN, DM, partially blind and s/p left BKA, who presented to the ED due to vomiting. Patient states that he was feeling fine until today when he started to have nausea and multiple episodes of vomiting. Patient unable to state exactly how many episodes he had. Denies any signs of blood as far as he knows. Patient also had some abdominal pain mainly located on RLQ but states it is improved now. Patient states that he has had similar episodes previously and has gone to hospital but unsure exactly about what is the cause of symptoms. Patient states he normally has HD sessions on T/T/S but it was changed due to the holidays, and he missed another session due to feeling sick. Currently, patient denies any chest pain, shortness of breath, headache, dizziness or abdominal pain. He was admitted for management of intractable vomiting which is likely 2/2 to diabetic gastroparesis vs electrolyte derangements. He was mildly improved on Reglan, and was not able to tolerate the entire session of dialysis 12/28. After this, he refused AM meds and blood draws, and his BP remained elevated due to rebound HTN. Clonidine PO was made patch and labetalol 5mg push given with hydralazine PRN pushes. Pt now has IV access, clonidine patch has been D'christy and BP is better as he has restarted full sessions of HD (last UF removed as pt volume depleted). His brother and HCP (Georgi King) was contacted and reaffirmed [facility] as full code status.     INTERVAL HPI/OVERNIGHT EVENTS:   Patient was examined while he was lying in bed, Aox1-2, not responding to questions, in better spirits/ less symptomatic than yesterday. He has normal bowel movements, is anuric, and is having improved nausea and vomiting. For dialysis today. He is continued on carafate and PPI BD. For dialysis today, and is barely able to tolerate clears. Pt will be observed until abdominal pain and diet normalize.        REVIEW OF SYSTEMS:  CONSTITUTIONAL: Fatigue + No fever, weight loss  RESPIRATORY: No cough, wheezing, chills or hemoptysis; No shortness of breath  CARDIOVASCULAR: No chest pain, palpitations, dizziness, or leg swelling  GASTROINTESTINAL: bilious emesis, retching+ No abdominal pain; No diarrhea or constipation. No melena or hematochezia.  GENITOURINARY: No dysuria or hematuria, urinary frequency  NEUROLOGICAL: No headaches, memory loss, loss of strength, numbness, or tremors  SKIN: No itching, burning, rashes, or lesions     MEDICATIONS  (STANDING):  calcium acetate 667 milliGRAM(s) Oral three times a day with meals  dextrose 50% Injectable 50 milliLiter(s) IV Push <User Schedule>  folic acid 1 milliGRAM(s) Oral daily  insulin lispro (ADMELOG) corrective regimen sliding scale   SubCutaneous Before meals and at bedtime  nortriptyline 10 milliGRAM(s) Oral daily  pantoprazole  Injectable 40 milliGRAM(s) IV Push two times a day  sevelamer carbonate 1600 milliGRAM(s) Oral three times a day with meals  simvastatin 40 milliGRAM(s) Oral at bedtime  sucralfate suspension 1 Gram(s) Oral four times a day    MEDICATIONS  (PRN):  acetaminophen   Tablet .. 650 milliGRAM(s) Oral every 6 hours PRN Temp greater or equal to 38C (100.4F), Moderate Pain (4 - 6)  guaiFENesin   Syrup  (Sugar-Free) 100 milliGRAM(s) Oral every 6 hours PRN Cough  hydrALAZINE Injectable 5 milliGRAM(s) IV Push every 8 hours PRN SBP >160  metoclopramide Injectable 5 milliGRAM(s) IV Push every 8 hours PRN Vomiting  ondansetron Injectable 4 milliGRAM(s) IV Push every 6 hours PRN Nausea and/or Vomiting      Vital Signs Last 24 Hrs  T(C): 36.5 (05 Jan 2021 09:14), Max: 36.8 (04 Jan 2021 23:46)  T(F): 97.7 (05 Jan 2021 09:14), Max: 98.3 (04 Jan 2021 23:46)  HR: 79 (05 Jan 2021 09:14) (79 - 112)  BP: 142/79 (05 Jan 2021 09:14) (127/62 - 148/92)  BP(mean): --  RR: 16 (05 Jan 2021 09:14) (16 - 18)  SpO2: 96% (05 Jan 2021 09:14) (96% - 100%)    PHYSICAL EXAMINATION:  GENERAL: Thinly built AAM, in NAD   HEAD:  Atraumatic, Normocephalic  EYES:  conjunctiva and sclera clear  NECK: Supple, No JVD  CHEST/LUNG: Clear to auscultation.  No rales, rhonchi, wheezing, or rubs  HEART: Regular rate and rhythm; No murmurs, rubs, or gallops  ABDOMEN: Soft, Nontender, Nondistended; Bowel sounds increased, bilious emesis in vomit pan  NERVOUS SYSTEM:  Alert but confused, Oriented X2, No motor or sensory deficits   EXTREMITIES: R AVF in place 2+ Peripheral Pulses, No clubbing, cyanosis, or edema  SKIN: warm dry                      CAPILLARY BLOOD GLUCOSE      RADIOLOGY & ADDITIONAL TESTS:

## 2021-01-05 NOTE — PROGRESS NOTE ADULT - ATTENDING COMMENTS
PATIENT SEEN AND EXAMINED. CASE D/W FLOOR TEAM. ABOVE ROS/VS/PE REVIEWED AND VERIFIED.    HPI:  Patient is a 60 year old male from [place] [address], with PMH of ESRD on HD TTS, HTN, DM, partially blind and s/p left BKA, who presented to the ED due to vomiting. Patient states that he was feeling fine until today when he started to have nausea and multiple episodes of vomiting. Patient unable to state exactly how many episodes he had. Denies any signs of blood as far as he knows. Patient also had some abdominal pain mainly located on RLQ but states it is improved now. Patient states that he has had similar episodes previously and has gone to hospital but unsure exactly about what is the cause of symptoms. Patient states he normally has HD sessions on T/T/S but due to holidays, schedule has been slightly changed and had last session on Friday. Patient states he was supposed to have HD today but did not go due to feeling sick. Currently, patient denies any chest pain, shortness of breath, headache, dizziness or abdominal pain.     PLAN:    # DYSPHAGIA W/ RECURRENT N/V S/T GRADE D ESOPHAGITIS AND DUODENITIS S/P EGD 1/4 - ON PROTONIX 40MG BID, CARAFATE QID, CLEAR LIQUID DIET TO BE ADVANCED AS TOLERATED, GASTROENTEROLOGY CONSULT IN PROGRESS  - ON PRN ANTIEMETICS  # FEBRILE EPISODE - RESOLVED - MONITOR BLOOD CULTURES, MENTAL STATUS, VITALS  # ESRD ON HD TTS  # HTN  # DM - MONITORING BG [LABILE DUE TO POOR PO INTAKE], WILL ADJUST INSULIN PRIOR TO DISCHARGE  # PARTIALLY BLIND  # S/P LEFT BKA  # GI AND DVT PPX    SHAKIR MAC MD COVERING KUSH MAC MD

## 2021-01-05 NOTE — PROGRESS NOTE ADULT - SUBJECTIVE AND OBJECTIVE BOX
GI PROGRESS NOTE    Patient is a 60y old  Male who presents with a chief complaint of vomiting (05 Jan 2021 10:07)      HPI:  Patient is a 60 year old male from [place] [address], with PMH of ESRD on HD TTS, HTN, DM, partially blind and s/p left BKA, who presented to the ED due to vomiting. Patient states that he was feeling fine until today when he started to have nausea and multiple episodes of vomiting. Patient unable to state exactly how many episodes he had. Denies any signs of blood as far as he knows. Patient also had some abdominal pain mainly located on RLQ but states it is improved now. Patient states that he has had similar episodes previously and has gone to hospital but unsure exactly about what is the cause of symptoms. Patient states he normally has HD sessions on T/T/S but due to holidays, schedule has been slightly changed and had last session on Friday. Patient states he was supposed to have HD today but did not go due to feeling sick. Currently, patient denies any chest pain, shortness of breath, headache, dizziness or abdominal pain.  (27 Dec 2020 20:37)          ______________________________________________________________________  PMH/PSH:  PAST MEDICAL & SURGICAL HISTORY:  ESRD on hemodialysis    Vision loss of left eye    Osteoporosis    Osteoarthritis    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    Diabetic neuropathy    Diabetes mellitus    Hyperparathyroidism    Spinal stenosis of lumbosacral region    Peripheral vascular disease    HLD (hyperlipidemia)    Coronary artery disease    Glaucoma    Anemia    CKD (chronic kidney disease)    Adrenal insufficiency    Hypertension    S/P arteriovenous (AV) fistula creation  2018    History of left cataract extraction    History of right cataract extraction    Below knee amputation status, left      ______________________________________________________________________  MEDS:  MEDICATIONS  (STANDING):  calcium acetate 667 milliGRAM(s) Oral three times a day with meals  dextrose 50% Injectable 50 milliLiter(s) IV Push <User Schedule>  folic acid 1 milliGRAM(s) Oral daily  insulin lispro (ADMELOG) corrective regimen sliding scale   SubCutaneous Before meals and at bedtime  nortriptyline 10 milliGRAM(s) Oral daily  pantoprazole  Injectable 40 milliGRAM(s) IV Push two times a day  sevelamer carbonate 1600 milliGRAM(s) Oral three times a day with meals  simvastatin 40 milliGRAM(s) Oral at bedtime  sucralfate suspension 1 Gram(s) Oral four times a day    MEDICATIONS  (PRN):  acetaminophen   Tablet .. 650 milliGRAM(s) Oral every 6 hours PRN Temp greater or equal to 38C (100.4F), Moderate Pain (4 - 6)  guaiFENesin   Syrup  (Sugar-Free) 100 milliGRAM(s) Oral every 6 hours PRN Cough  hydrALAZINE Injectable 5 milliGRAM(s) IV Push every 8 hours PRN SBP >160  metoclopramide Injectable 5 milliGRAM(s) IV Push every 8 hours PRN Vomiting  ondansetron Injectable 4 milliGRAM(s) IV Push every 6 hours PRN Nausea and/or Vomiting    ______________________________________________________________________  ALL:   Allergies    fish (Rash)  liver (Anaphylaxis)  No Known Drug Allergies    Intolerances      ______________________________________________________________________  SH: Social History:  Patient is from [facility]. Denies any smoking, alcohol use or use of illicit drugs. (27 Dec 2020 20:37)    ______________________________________________________________________  FH:  FAMILY HISTORY:    ______________________________________________________________________  ROS:    CONSTITUTIONAL:  No weight loss, fever, chills, weakness or fatigue.    HEENT:  Eyes:  No visual loss, blurred vision, double vision or yellow sclerae. Ears, Nose, Throat:  No hearing loss, sneezing, congestion, runny nose or sore throat.    SKIN:  No rash or itching.    CARDIOVASCULAR:  No chest pain, chest pressure or chest discomfort. No palpitations or edema.    RESPIRATORY:  No shortness of breath, cough or sputum.    GASTROINTESTINAL:  SEE HPI    GENITOURINARY:  No dysuria, hematuria, urinary frequency    NEUROLOGICAL:  No headache, dizziness, syncope, paralysis, ataxia, numbness or tingling in the extremities. No change in bowel or bladder control.    MUSCULOSKELETAL:  No muscle, back pain, joint pain or stiffness.    HEMATOLOGIC:  No anemia, bleeding or bruising.    LYMPHATICS:  No enlarged nodes. No history of splenectomy.    PSYCHIATRIC:  No history of depression or anxiety.    ENDOCRINOLOGIC:  No reports of sweating, cold or heat intolerance. No polyuria or polydipsia.    ALLERGIES:  No history of asthma, hives, eczema or rhinitis.  ______________________________________________________________________  PHYSICAL EXAM:  T(C): 36.5 (01-05-21 @ 09:14), Max: 36.8 (01-04-21 @ 23:46)  HR: 79 (01-05-21 @ 09:14)  BP: 142/79 (01-05-21 @ 09:14)  RR: 16 (01-05-21 @ 09:14)  SpO2: 96% (01-05-21 @ 09:14)  Wt(kg): --      GEN: NAD   CVS- S1 S2  ABD: soft nontender, non distended, bowel sounds+  LUNGS: clear to auscultation  NEURO: awake, alert, covers over head but responding to questions   Extremities: no cyanosis, no calf tenderness, no edema, L bKA     ______________________________________________________________________  LABS:            ______________________________________________________________________

## 2021-01-05 NOTE — PROGRESS NOTE ADULT - PROBLEM SELECTOR PLAN 1
Grade D  PPI BID x one month then daily  liquid carafate 1 gm QID x 1 month Grade D  PPI BID x one month then daily  liquid carafate 1 gm QID x 1 month  advance diet as tolerated

## 2021-01-05 NOTE — PROGRESS NOTE ADULT - PROBLEM SELECTOR PLAN 3
- Pt spiked fever of Tmax 100.4 1/2/2021, no further episodes   - No left shift/ WBC on blood work   - Pt refused blood Cx's, reported will allow if spikes a fever again  - CXR wnl, no infiltrates significant for aspiration; unremarkable lung exam   - not producing urine on straight cath  - WIll continue to monitor for localizing cause; no further fevers

## 2021-01-05 NOTE — PROGRESS NOTE ADULT - PROBLEM SELECTOR PLAN 8
Was on heparin SQ, held due to possible hematemesis  IMPROVE VTE Individual Risk Assessment  RISK                                                                Points  [  ] Previous VTE                                                  3  [  ] Thrombophilia                                               2  [  ] Lower limb paralysis                                      2  [  ] Current Cancer                                              2   [ x ] Immobilization > 24 hrs                                1  [  ] ICU/CCU stay > 24 hours                              1  [  ] Age > 60                                                      1  IMPROVE VTE Score ___1______

## 2021-01-05 NOTE — PROGRESS NOTE ADULT - ASSESSMENT
Patient is a 60 year old male from [place] [address], with PMH of ESRD on HD TTS, HTN, DM, partially blind and s/p left BKA, who presented with vomiting, and was admitted for dialysis and work-up of possible esophagitis vs uremia vs diabetic gastroparesis.     Hgb of 12.6. Potassium of 7.3 with repeat of 5.9. Creatinine of 19.80. COVID negative. CT abdomen showing thickened distal esophagus. Mild diffuse bladder wall thickening. Given NS bolus, LR bolus, lokelma, reglan and zofran in ED.

## 2021-01-05 NOTE — PROGRESS NOTE ADULT - PROBLEM SELECTOR PLAN 2
- Pt p/w vomiting initially Non bilious/ non bloody thought to be uremic in etiology  - Has continued to have vomiting, now coffee ground emesis   - Hb is stable around 12g/dl  - Pt is not able to tolerate PO diet  - Reduced after restarting HD, but not symptomatically relieved with HD or reglan/zofran  - CT A/P showing thickened distal esophagus suggesting esophagitis  - May be esophageal mucosal tear/ esophagitis due to intense retching  - EGD: esophagitis and duodenitis -> started carafate and PPI BD   - Diet clears for now, to advance to pureed if tolerated   - GI Dr. Molina

## 2021-01-06 LAB
ANION GAP SERPL CALC-SCNC: 21 MMOL/L — HIGH (ref 5–17)
BUN SERPL-MCNC: 76 MG/DL — HIGH (ref 7–18)
CALCIUM SERPL-MCNC: 7.2 MG/DL — LOW (ref 8.4–10.5)
CHLORIDE SERPL-SCNC: 92 MMOL/L — LOW (ref 96–108)
CO2 SERPL-SCNC: 25 MMOL/L — SIGNIFICANT CHANGE UP (ref 22–31)
CREAT SERPL-MCNC: 17.7 MG/DL — HIGH (ref 0.5–1.3)
GLUCOSE BLDC GLUCOMTR-MCNC: 103 MG/DL — HIGH (ref 70–99)
GLUCOSE BLDC GLUCOMTR-MCNC: 137 MG/DL — HIGH (ref 70–99)
GLUCOSE BLDC GLUCOMTR-MCNC: 77 MG/DL — SIGNIFICANT CHANGE UP (ref 70–99)
GLUCOSE BLDC GLUCOMTR-MCNC: 93 MG/DL — SIGNIFICANT CHANGE UP (ref 70–99)
GLUCOSE BLDC GLUCOMTR-MCNC: 96 MG/DL — SIGNIFICANT CHANGE UP (ref 70–99)
GLUCOSE SERPL-MCNC: 107 MG/DL — HIGH (ref 70–99)
HCT VFR BLD CALC: 39.6 % — SIGNIFICANT CHANGE UP (ref 39–50)
HGB BLD-MCNC: 12 G/DL — LOW (ref 13–17)
MAGNESIUM SERPL-MCNC: 2.6 MG/DL — SIGNIFICANT CHANGE UP (ref 1.6–2.6)
MCHC RBC-ENTMCNC: 27.9 PG — SIGNIFICANT CHANGE UP (ref 27–34)
MCHC RBC-ENTMCNC: 30.3 GM/DL — LOW (ref 32–36)
MCV RBC AUTO: 92.1 FL — SIGNIFICANT CHANGE UP (ref 80–100)
NRBC # BLD: 0 /100 WBCS — SIGNIFICANT CHANGE UP (ref 0–0)
PHOSPHATE SERPL-MCNC: 8.9 MG/DL — HIGH (ref 2.5–4.5)
PLATELET # BLD AUTO: 152 K/UL — SIGNIFICANT CHANGE UP (ref 150–400)
POTASSIUM SERPL-MCNC: 3.8 MMOL/L — SIGNIFICANT CHANGE UP (ref 3.5–5.3)
POTASSIUM SERPL-SCNC: 3.8 MMOL/L — SIGNIFICANT CHANGE UP (ref 3.5–5.3)
RBC # BLD: 4.3 M/UL — SIGNIFICANT CHANGE UP (ref 4.2–5.8)
RBC # FLD: 18.1 % — HIGH (ref 10.3–14.5)
SARS-COV-2 RNA SPEC QL NAA+PROBE: SIGNIFICANT CHANGE UP
SODIUM SERPL-SCNC: 138 MMOL/L — SIGNIFICANT CHANGE UP (ref 135–145)
SURGICAL PATHOLOGY STUDY: SIGNIFICANT CHANGE UP
WBC # BLD: 7.9 K/UL — SIGNIFICANT CHANGE UP (ref 3.8–10.5)
WBC # FLD AUTO: 7.9 K/UL — SIGNIFICANT CHANGE UP (ref 3.8–10.5)

## 2021-01-06 RX ORDER — PANTOPRAZOLE SODIUM 20 MG/1
40 TABLET, DELAYED RELEASE ORAL
Refills: 0 | Status: DISCONTINUED | OUTPATIENT
Start: 2021-01-06 | End: 2021-01-07

## 2021-01-06 RX ADMIN — Medication 5 MILLIGRAM(S): at 03:01

## 2021-01-06 RX ADMIN — PANTOPRAZOLE SODIUM 40 MILLIGRAM(S): 20 TABLET, DELAYED RELEASE ORAL at 17:24

## 2021-01-06 RX ADMIN — Medication 1 GRAM(S): at 05:10

## 2021-01-06 RX ADMIN — Medication 50 MILLILITER(S): at 17:23

## 2021-01-06 RX ADMIN — Medication 1 PATCH: at 12:49

## 2021-01-06 RX ADMIN — Medication 1 GRAM(S): at 17:24

## 2021-01-06 NOTE — PROGRESS NOTE ADULT - SUBJECTIVE AND OBJECTIVE BOX
[facility] : Progress Note :: [phone], (office [phone]),   Dr Mosher / Dr Washington / Dr Young / Dr Doll / Dr Varsha TURCIOS / Dr Tello / Dr Garcia / Dr Larry qiu  _____________________________________________________________________________________________    s/p HD earlier today. Nausea and vomiting better    fish (Rash)  liver (Anaphylaxis)  No Known Drug Allergies    Hospital Medications:   MEDICATIONS  (STANDING):  calcium acetate 667 milliGRAM(s) Oral three times a day with meals  dextrose 50% Injectable 50 milliLiter(s) IV Push <User Schedule>  folic acid 1 milliGRAM(s) Oral daily  insulin lispro (ADMELOG) corrective regimen sliding scale   SubCutaneous Before meals and at bedtime  nortriptyline 10 milliGRAM(s) Oral daily  pantoprazole  Injectable 40 milliGRAM(s) IV Push two times a day  sevelamer carbonate 1600 milliGRAM(s) Oral three times a day with meals  simvastatin 40 milliGRAM(s) Oral at bedtime  sucralfate suspension 1 Gram(s) Oral four times a day  traMADol 50 milliGRAM(s) Oral every 8 hours        VITALS:  T(F): 98.1 (01-06-21 @ 11:40), Max: 99 (01-05-21 @ 16:59)  HR: 112 (01-06-21 @ 11:40)  BP: 103/67 (01-06-21 @ 11:40)  RR: 16 (01-06-21 @ 11:40)  SpO2: 100% (01-06-21 @ 11:40)  Wt(kg): --    01-05 @ 07:01  -  01-06 @ 07:00  --------------------------------------------------------  IN: 400 mL / OUT: 446 mL / NET: -46 mL    01-06 @ 07:01  -  01-06 @ 14:56  --------------------------------------------------------  IN: 600 mL / OUT: 347 mL / NET: 253 mL        PHYSICAL EXAM:  Constitutional: NAD  HEENT: anicteric sclera, oropharynx clear.  Neck: No JVD  Respiratory: CTAB, no wheezes, rales or rhonchi  Cardiovascular: S1, S2, RRR  Gastrointestinal: BS+, soft, NT/ND  Extremities: No peripheral edema  Neurological: A/O x 3, no focal deficits  : No CVA tenderness. No [place].   Skin: No rashes  Vascular Access: LUEAVF with thrill and bruit    LABS:  01-06    138  |  92<L>  |  76<H>  ----------------------------<  107<H>  3.8   |  25  |  17.70<H>    Ca    7.2<L>      06 Jan 2021 09:51  Phos  8.9     01-06  Mg     2.6     01-06    TPro  7.1  /  Alb  3.3<L>  /  TBili  0.6  /  DBili      /  AST  <3<L>  /  ALT  14  /  AlkPhos  86  01-05    Creatinine Trend: 17.70 <--, 18.90 <--, 10.90 <--, 15.10 <--, 11.60 <--, 20.50 <--, 19.10 <--                        12.0   7.90  )-----------( 152      ( 06 Jan 2021 09:51 )             39.6     Urine Studies:      RADIOLOGY & ADDITIONAL STUDIES:

## 2021-01-06 NOTE — PROGRESS NOTE ADULT - ASSESSMENT
#ESRD. S/P HD earlier   #  HTN, stable.  BP stable, off BP meds.   # renal osteodystrophy: cont phoslo and sevelamer  # anemia of CKD, Off epogen.  # nausea and vomitting. s/p endoscopy- esophagitis and duodenitis

## 2021-01-06 NOTE — PROGRESS NOTE ADULT - ASSESSMENT
Patient is a 60 year old male from [place] [address], with PMH of ESRD on HD TTS, HTN, DM, partially blind and s/p left BKA, who presented with vomiting, and was admitted for dialysis and work-up of possible esophagitis vs uremia vs diabetic gastroparesis.     Hgb of 12.6. Potassium of 7.3 with repeat of 5.9. Creatinine of 19.80. COVID negative. CT abdomen showing thickened distal esophagus. Mild diffuse bladder wall thickening. Given NS bolus, LR bolus, lokelma, reglan and zofran in ED.

## 2021-01-06 NOTE — PROGRESS NOTE ADULT - PROBLEM SELECTOR PLAN 4
- Pt BP was 201/99  - S/p metoprolol 12.5mg q12 and resumed his home medication.  - Started on Clonidine PO which pt is not tolerating due to vomiting. Switched to clonidine patch, now discontinued   - Will continue on  hydralazine IV pushes PRN  - Refused dialysis 12/29 , 12/30, got full dialysis 12/31/20, 1/2  - Will monitor BP - Pt BP was 201/99 on admission, now much better since resuming dialysis   - S/p metoprolol 12.5mg q12 and resumed his home medication.  - Started on Clonidine PO which pt is not tolerating due to vomiting. Switched to clonidine patch, now discontinued   - Will continue on  hydralazine IV pushes PRN  - Refused dialysis 12/29 , 12/30, got full dialysis 12/31/20, 1/2  - Will monitor BP

## 2021-01-06 NOTE — PROGRESS NOTE ADULT - PROBLEM SELECTOR PLAN 1
- Patient on HD T/T/S   - HD12/28 incomplete, refused 12/19, last 12/31, 1/1  - Phosphorus is 6.6 -> 6.4 :    C/w phoslo and monitor  - Hyperkalemia of 5.5 on adm-> 4.0mg/dl 1/1   - High anion gap metabolic acidosis due to ESRD (AG of 20)  - Normal Bicarb and VBG was normal  - Nephro Dr. Mosher  - Continue renvela   - Monitor BMP daily   - SW consult - Patient on HD T/T/S   - HD12/28 1/5 incomplete, refused 12/19, last 12/31, 1/1, 1/6  - Phosphorus is 6.6 -> 6.4 :    C/w phoslo and monitor  - Hyperkalemia of 5.5 on adm-> 4.0mg/dl 1/1   - High anion gap metabolic acidosis due to ESRD (AG of 20)  - Normal Bicarb and VBG was normal  - Nephro Dr. Mosher  - Continue renvela   - Monitor BMP daily   - SW consult

## 2021-01-06 NOTE — PROGRESS NOTE ADULT - NSHPATTENDINGPLANDISCUSS_GEN_ALL_CORE
PATIENT AND FLOOR STAFF
Resident Dr. Guerrero
PATIENT AND FLOOR STAFF
Resident Dr. Guerrero
Dr. Beard
PATIENT AND FLOOR STAFF
Resident Dr. Guerrero

## 2021-01-06 NOTE — PROGRESS NOTE ADULT - SUBJECTIVE AND OBJECTIVE BOX
PGY-1 Progress Note discussed with attending    PAGER #: [[phone]] TILL 5:00 PM  PLEASE CONTACT ON CALL TEAM:  - On Call Team (Please refer to Dejon) FROM 5:00 PM - 8:30PM  - Nightfloat Team FROM 8:30 -7:30 AM    CHIEF COMPLAINT & BRIEF HOSPITAL COURSE:    INTERVAL HPI/OVERNIGHT EVENTS:       REVIEW OF SYSTEMS:  CONSTITUTIONAL: No fever, weight loss, or fatigue  RESPIRATORY: No cough, wheezing, chills or hemoptysis; No shortness of breath  CARDIOVASCULAR: No chest pain, palpitations, dizziness, or leg swelling  GASTROINTESTINAL: No abdominal pain. No nausea, vomiting, or hematemesis; No diarrhea or constipation. No melena or hematochezia.  GENITOURINARY: No dysuria or hematuria, urinary frequency  NEUROLOGICAL: No headaches, memory loss, loss of strength, numbness, or tremors  SKIN: No itching, burning, rashes, or lesions     MEDICATIONS  (STANDING):  calcium acetate 667 milliGRAM(s) Oral three times a day with meals  dextrose 50% Injectable 50 milliLiter(s) IV Push <User Schedule>  folic acid 1 milliGRAM(s) Oral daily  insulin lispro (ADMELOG) corrective regimen sliding scale   SubCutaneous Before meals and at bedtime  nortriptyline 10 milliGRAM(s) Oral daily  pantoprazole  Injectable 40 milliGRAM(s) IV Push two times a day  sevelamer carbonate 1600 milliGRAM(s) Oral three times a day with meals  simvastatin 40 milliGRAM(s) Oral at bedtime  sucralfate suspension 1 Gram(s) Oral four times a day  traMADol 50 milliGRAM(s) Oral every 8 hours    MEDICATIONS  (PRN):  acetaminophen   Tablet .. 650 milliGRAM(s) Oral every 6 hours PRN Temp greater or equal to 38C (100.4F), Moderate Pain (4 - 6)  guaiFENesin   Syrup  (Sugar-Free) 100 milliGRAM(s) Oral every 6 hours PRN Cough  hydrALAZINE Injectable 5 milliGRAM(s) IV Push every 8 hours PRN SBP >160  metoclopramide Injectable 5 milliGRAM(s) IV Push every 8 hours PRN Vomiting  ondansetron Injectable 4 milliGRAM(s) IV Push every 6 hours PRN Nausea and/or Vomiting      Vital Signs Last 24 Hrs  T(C): 36.1 (06 Jan 2021 07:32), Max: 37.2 (05 Jan 2021 16:59)  T(F): 97 (06 Jan 2021 07:32), Max: 99 (05 Jan 2021 16:59)  HR: 109 (06 Jan 2021 07:32) (98 - 109)  BP: 118/67 (06 Jan 2021 07:32) (101/53 - 132/56)  BP(mean): 80 (06 Jan 2021 07:32) (80 - 80)  RR: 16 (06 Jan 2021 07:32) (16 - 18)  SpO2: 100% (06 Jan 2021 07:32) (95% - 100%)    PHYSICAL EXAMINATION:  GENERAL: NAD, well built  HEAD:  Atraumatic, Normocephalic  EYES:  conjunctiva and sclera clear  NECK: Supple, No JVD, Normal thyroid  CHEST/LUNG: Clear to auscultation. Clear to percussion bilaterally; No rales, rhonchi, wheezing, or rubs  HEART: Regular rate and rhythm; No murmurs, rubs, or gallops  ABDOMEN: Soft, Nontender, Nondistended; Bowel sounds present  NERVOUS SYSTEM:  Alert & Oriented X3,    EXTREMITIES:  2+ Peripheral Pulses, No clubbing, cyanosis, or edema  SKIN: warm dry                          12.0   7.90  )-----------( 152      ( 06 Jan 2021 09:51 )             39.6     01-06    138  |  92<L>  |  76<H>  ----------------------------<  107<H>  3.8   |  25  |  17.70<H>    Ca    7.2<L>      06 Jan 2021 09:51  Phos  8.9     01-06  Mg     2.6     01-06    TPro  7.1  /  Alb  3.3<L>  /  TBili  0.6  /  DBili  x   /  AST  <3<L>  /  ALT  14  /  AlkPhos  86  01-05    LIVER FUNCTIONS - ( 05 Jan 2021 17:13 )  Alb: 3.3 g/dL / Pro: 7.1 g/dL / ALK PHOS: 86 U/L / ALT: 14 U/L DA / AST: <3 U/L / GGT: x                   CAPILLARY BLOOD GLUCOSE      RADIOLOGY & ADDITIONAL TESTS:                   PGY-1 Progress Note discussed with attending    PAGER #: [[phone]] TILL 5:00 PM  PLEASE CONTACT ON CALL TEAM:  - On Call Team (Please refer to Dejon) FROM 5:00 PM - 8:30PM  - Nightfloat Team FROM 8:30 -7:30 AM    CHIEF COMPLAINT & BRIEF HOSPITAL COURSE:  Patient is a 60M from [place] [address], with PMH of ESRD on HD TTS, HTN, DM, partially blind and s/p left BKA, who presented to the ED due to vomiting. Patient states that he was feeling fine until today when he started to have nausea and multiple episodes of vomiting. Patient unable to state exactly how many episodes he had. Denies any signs of blood as far as he knows. Patient also had some abdominal pain mainly located on RLQ but states it is improved now. Patient states that he has had similar episodes previously and has gone to hospital but unsure exactly about what is the cause of symptoms. Patient states he normally has HD sessions on T/T/S but it was changed due to the holidays, and he missed another session due to feeling sick. Currently, patient denies any chest pain, shortness of breath, headache, dizziness or abdominal pain. He was admitted for management of intractable vomiting which is likely 2/2 to diabetic gastroparesis vs electrolyte derangements. He was mildly improved on Reglan, and was not able to tolerate the entire session of dialysis 12/28. After this, he refused AM meds and blood draws, and his BP remained elevated due to rebound HTN. Clonidine PO was made patch and labetalol 5mg push given with hydralazine PRN pushes. Pt now has IV access, clonidine patch has been D'christy and BP is better as he has restarted full sessions of HD (last UF removed as pt volume depleted). His brother and HCP (Georgi King) was contacted and reaffirmed [facility] as full code status.     INTERVAL HPI/OVERNIGHT EVENTS:   Patient was examined while he was lying in bed, Aox1-2, not responding to questions, less n/v than yesterday, able to tolerate some PO intake. Initially refused dialysis, then accepted it. Was volume depleted, so did UF exchange. He has normal bowel movements, is anuric, and is having improved. Possible D/c tomorrow, Pending PT and COVID test.     REVIEW OF SYSTEMS:  CONSTITUTIONAL: Fatigue + No fever, weight loss  RESPIRATORY: No cough, wheezing, chills or hemoptysis; No shortness of breath  CARDIOVASCULAR: No chest pain, palpitations, dizziness, or leg swelling  GASTROINTESTINAL: bilious emesis, retching+ No abdominal pain; No diarrhea or constipation. No melena or hematochezia.  GENITOURINARY: No dysuria or hematuria, urinary frequency  NEUROLOGICAL: No headaches, memory loss, loss of strength, numbness, or tremors  SKIN: No itching, burning, rashes, or lesions     MEDICATIONS  (STANDING):  calcium acetate 667 milliGRAM(s) Oral three times a day with meals  dextrose 50% Injectable 50 milliLiter(s) IV Push <User Schedule>  folic acid 1 milliGRAM(s) Oral daily  insulin lispro (ADMELOG) corrective regimen sliding scale   SubCutaneous Before meals and at bedtime  nortriptyline 10 milliGRAM(s) Oral daily  pantoprazole  Injectable 40 milliGRAM(s) IV Push two times a day  sevelamer carbonate 1600 milliGRAM(s) Oral three times a day with meals  simvastatin 40 milliGRAM(s) Oral at bedtime  sucralfate suspension 1 Gram(s) Oral four times a day  traMADol 50 milliGRAM(s) Oral every 8 hours    MEDICATIONS  (PRN):  acetaminophen   Tablet .. 650 milliGRAM(s) Oral every 6 hours PRN Temp greater or equal to 38C (100.4F), Moderate Pain (4 - 6)  guaiFENesin   Syrup  (Sugar-Free) 100 milliGRAM(s) Oral every 6 hours PRN Cough  hydrALAZINE Injectable 5 milliGRAM(s) IV Push every 8 hours PRN SBP >160  metoclopramide Injectable 5 milliGRAM(s) IV Push every 8 hours PRN Vomiting  ondansetron Injectable 4 milliGRAM(s) IV Push every 6 hours PRN Nausea and/or Vomiting      Vital Signs Last 24 Hrs  T(C): 36.1 (06 Jan 2021 07:32), Max: 37.2 (05 Jan 2021 16:59)  T(F): 97 (06 Jan 2021 07:32), Max: 99 (05 Jan 2021 16:59)  HR: 109 (06 Jan 2021 07:32) (98 - 109)  BP: 118/67 (06 Jan 2021 07:32) (101/53 - 132/56)  BP(mean): 80 (06 Jan 2021 07:32) (80 - 80)  RR: 16 (06 Jan 2021 07:32) (16 - 18)  SpO2: 100% (06 Jan 2021 07:32) (95% - 100%)    PHYSICAL EXAMINATION:  GENERAL: Thinly built AAM, in NAD   HEAD:  Atraumatic, Normocephalic  EYES:  conjunctiva and sclera clear  NECK: Supple, No JVD  CHEST/LUNG: Clear to auscultation.  No rales, rhonchi, wheezing, or rubs  HEART: Regular rate and rhythm; No murmurs, rubs, or gallops  ABDOMEN: Soft, Nontender, Nondistended; Bowel sounds increased, bilious emesis in vomit pan  NERVOUS SYSTEM:  Alert but confused, Oriented X2, No motor or sensory deficits   EXTREMITIES: R AVF in place 2+ Peripheral Pulses, No clubbing, cyanosis, or edema  SKIN: warm dry                          12.0   7.90  )-----------( 152      ( 06 Jan 2021 09:51 )             39.6     01-06    138  |  92<L>  |  76<H>  ----------------------------<  107<H>  3.8   |  25  |  17.70<H>    Ca    7.2<L>      06 Jan 2021 09:51  Phos  8.9     01-06  Mg     2.6     01-06    TPro  7.1  /  Alb  3.3<L>  /  TBili  0.6  /  DBili  x   /  AST  <3<L>  /  ALT  14  /  AlkPhos  86  01-05    LIVER FUNCTIONS - ( 05 Jan 2021 17:13 )  Alb: 3.3 g/dL / Pro: 7.1 g/dL / ALK PHOS: 86 U/L / ALT: 14 U/L DA / AST: <3 U/L / GGT: x                   CAPILLARY BLOOD GLUCOSE      RADIOLOGY & ADDITIONAL TESTS:

## 2021-01-06 NOTE — PROGRESS NOTE ADULT - ATTENDING COMMENTS
PATIENT SEEN AND EXAMINED. CASE D/W FLOOR TEAM. ABOVE ROS/VS/PE REVIEWED AND VERIFIED.    HPI:  Patient is a 60 year old male from [place] [address], with PMH of ESRD on HD TTS, HTN, DM, partially blind and s/p left BKA, who presented to the ED due to vomiting. Patient states that he was feeling fine until today when he started to have nausea and multiple episodes of vomiting. Patient unable to state exactly how many episodes he had. Denies any signs of blood as far as he knows. Patient also had some abdominal pain mainly located on RLQ but states it is improved now. Patient states that he has had similar episodes previously and has gone to hospital but unsure exactly about what is the cause of symptoms. Patient states he normally has HD sessions on T/T/S but due to holidays, schedule has been slightly changed and had last session on Friday. Patient states he was supposed to have HD today but did not go due to feeling sick. Currently, patient denies any chest pain, shortness of breath, headache, dizziness or abdominal pain.     PLAN:    # DYSPHAGIA W/ RECURRENT N/V S/T GRADE D ESOPHAGITIS AND DUODENITIS S/P EGD 1/4 - ON PROTONIX 40MG BID, CARAFATE QID, CLEAR LIQUID DIET TO BE ADVANCED AS TOLERATED, GASTROENTEROLOGY CONSULT IN PROGRESS  - ON PRN ANTIEMETICS  # FEBRILE EPISODE - RESOLVED - MONITOR BLOOD CULTURES, MENTAL STATUS, VITALS  # ESRD ON HD TTS  # HTN  # DM - MONITORING BG [LABILE DUE TO POOR PO INTAKE], WILL ADJUST INSULIN PRIOR TO DISCHARGE  # PARTIALLY BLIND  # S/P LEFT BKA  # GI AND DVT PPX    SHAKIR MAC MD COVERING KUSH MAC MD

## 2021-01-06 NOTE — PROGRESS NOTE ADULT - PROVIDER SPECIALTY LIST ADULT
Internal Medicine
Gastroenterology
Internal Medicine
Nephrology
Hospitalist
Nephrology
Gastroenterology
Gastroenterology
Internal Medicine
Gastroenterology
Internal Medicine

## 2021-01-06 NOTE — PROGRESS NOTE ADULT - REASON FOR ADMISSION
vomiting

## 2021-01-07 ENCOUNTER — TRANSCRIPTION ENCOUNTER (OUTPATIENT)
Age: 61
End: 2021-01-07

## 2021-01-07 VITALS — DIASTOLIC BLOOD PRESSURE: 65 MMHG | OXYGEN SATURATION: 100 % | HEART RATE: 74 BPM | SYSTOLIC BLOOD PRESSURE: 107 MMHG

## 2021-01-07 LAB
ALBUMIN SERPL ELPH-MCNC: 3 G/DL — LOW (ref 3.5–5)
ALP SERPL-CCNC: 88 U/L — SIGNIFICANT CHANGE UP (ref 40–120)
ALT FLD-CCNC: 11 U/L DA — SIGNIFICANT CHANGE UP (ref 10–60)
ANION GAP SERPL CALC-SCNC: 17 MMOL/L — SIGNIFICANT CHANGE UP (ref 5–17)
AST SERPL-CCNC: <3 U/L — LOW (ref 10–40)
BASOPHILS # BLD AUTO: 0.01 K/UL — SIGNIFICANT CHANGE UP (ref 0–0.2)
BASOPHILS NFR BLD AUTO: 0.2 % — SIGNIFICANT CHANGE UP (ref 0–2)
BILIRUB SERPL-MCNC: 0.8 MG/DL — SIGNIFICANT CHANGE UP (ref 0.2–1.2)
BUN SERPL-MCNC: 54 MG/DL — HIGH (ref 7–18)
CALCIUM SERPL-MCNC: 7.9 MG/DL — LOW (ref 8.4–10.5)
CHLORIDE SERPL-SCNC: 94 MMOL/L — LOW (ref 96–108)
CO2 SERPL-SCNC: 25 MMOL/L — SIGNIFICANT CHANGE UP (ref 22–31)
CREAT SERPL-MCNC: 13.5 MG/DL — HIGH (ref 0.5–1.3)
EOSINOPHIL # BLD AUTO: 0.04 K/UL — SIGNIFICANT CHANGE UP (ref 0–0.5)
EOSINOPHIL NFR BLD AUTO: 0.9 % — SIGNIFICANT CHANGE UP (ref 0–6)
GLUCOSE BLDC GLUCOMTR-MCNC: 134 MG/DL — HIGH (ref 70–99)
GLUCOSE BLDC GLUCOMTR-MCNC: 140 MG/DL — HIGH (ref 70–99)
GLUCOSE SERPL-MCNC: 139 MG/DL — HIGH (ref 70–99)
HCT VFR BLD CALC: 37.4 % — LOW (ref 39–50)
HGB BLD-MCNC: 11.4 G/DL — LOW (ref 13–17)
IMM GRANULOCYTES NFR BLD AUTO: 0.4 % — SIGNIFICANT CHANGE UP (ref 0–1.5)
INR BLD: 1.09 RATIO — SIGNIFICANT CHANGE UP (ref 0.88–1.16)
LYMPHOCYTES # BLD AUTO: 0.44 K/UL — LOW (ref 1–3.3)
LYMPHOCYTES # BLD AUTO: 9.6 % — LOW (ref 13–44)
MCHC RBC-ENTMCNC: 28.4 PG — SIGNIFICANT CHANGE UP (ref 27–34)
MCHC RBC-ENTMCNC: 30.5 GM/DL — LOW (ref 32–36)
MCV RBC AUTO: 93.3 FL — SIGNIFICANT CHANGE UP (ref 80–100)
MONOCYTES # BLD AUTO: 0.49 K/UL — SIGNIFICANT CHANGE UP (ref 0–0.9)
MONOCYTES NFR BLD AUTO: 10.7 % — SIGNIFICANT CHANGE UP (ref 2–14)
NEUTROPHILS # BLD AUTO: 3.57 K/UL — SIGNIFICANT CHANGE UP (ref 1.8–7.4)
NEUTROPHILS NFR BLD AUTO: 78.2 % — HIGH (ref 43–77)
NRBC # BLD: 0 /100 WBCS — SIGNIFICANT CHANGE UP (ref 0–0)
PLATELET # BLD AUTO: 61 K/UL — LOW (ref 150–400)
POTASSIUM SERPL-MCNC: 3.9 MMOL/L — SIGNIFICANT CHANGE UP (ref 3.5–5.3)
POTASSIUM SERPL-SCNC: 3.9 MMOL/L — SIGNIFICANT CHANGE UP (ref 3.5–5.3)
PROT SERPL-MCNC: 7.3 G/DL — SIGNIFICANT CHANGE UP (ref 6–8.3)
PROTHROM AB SERPL-ACNC: 12.9 SEC — SIGNIFICANT CHANGE UP (ref 10.6–13.6)
RBC # BLD: 4.01 M/UL — LOW (ref 4.2–5.8)
RBC # FLD: 17.5 % — HIGH (ref 10.3–14.5)
SODIUM SERPL-SCNC: 136 MMOL/L — SIGNIFICANT CHANGE UP (ref 135–145)
WBC # BLD: 4.57 K/UL — SIGNIFICANT CHANGE UP (ref 3.8–10.5)
WBC # FLD AUTO: 4.57 K/UL — SIGNIFICANT CHANGE UP (ref 3.8–10.5)

## 2021-01-07 PROCEDURE — 85027 COMPLETE CBC AUTOMATED: CPT

## 2021-01-07 PROCEDURE — 88312 SPECIAL STAINS GROUP 1: CPT

## 2021-01-07 PROCEDURE — 86706 HEP B SURFACE ANTIBODY: CPT

## 2021-01-07 PROCEDURE — 83605 ASSAY OF LACTIC ACID: CPT

## 2021-01-07 PROCEDURE — U0005: CPT

## 2021-01-07 PROCEDURE — 87340 HEPATITIS B SURFACE AG IA: CPT

## 2021-01-07 PROCEDURE — 85025 COMPLETE CBC W/AUTO DIFF WBC: CPT

## 2021-01-07 PROCEDURE — 80053 COMPREHEN METABOLIC PANEL: CPT

## 2021-01-07 PROCEDURE — 80048 BASIC METABOLIC PNL TOTAL CA: CPT

## 2021-01-07 PROCEDURE — 86769 SARS-COV-2 COVID-19 ANTIBODY: CPT

## 2021-01-07 PROCEDURE — 80074 ACUTE HEPATITIS PANEL: CPT

## 2021-01-07 PROCEDURE — 83735 ASSAY OF MAGNESIUM: CPT

## 2021-01-07 PROCEDURE — 84443 ASSAY THYROID STIM HORMONE: CPT

## 2021-01-07 PROCEDURE — 83036 HEMOGLOBIN GLYCOSYLATED A1C: CPT

## 2021-01-07 PROCEDURE — 82009 KETONE BODYS QUAL: CPT

## 2021-01-07 PROCEDURE — 88305 TISSUE EXAM BY PATHOLOGIST: CPT

## 2021-01-07 PROCEDURE — 82746 ASSAY OF FOLIC ACID SERUM: CPT

## 2021-01-07 PROCEDURE — 71045 X-RAY EXAM CHEST 1 VIEW: CPT

## 2021-01-07 PROCEDURE — 83540 ASSAY OF IRON: CPT

## 2021-01-07 PROCEDURE — 93005 ELECTROCARDIOGRAM TRACING: CPT

## 2021-01-07 PROCEDURE — 87635 SARS-COV-2 COVID-19 AMP PRB: CPT

## 2021-01-07 PROCEDURE — 99261: CPT

## 2021-01-07 PROCEDURE — 83690 ASSAY OF LIPASE: CPT

## 2021-01-07 PROCEDURE — 85610 PROTHROMBIN TIME: CPT

## 2021-01-07 PROCEDURE — 82306 VITAMIN D 25 HYDROXY: CPT

## 2021-01-07 PROCEDURE — 36415 COLL VENOUS BLD VENIPUNCTURE: CPT

## 2021-01-07 PROCEDURE — 87040 BLOOD CULTURE FOR BACTERIA: CPT

## 2021-01-07 PROCEDURE — 84100 ASSAY OF PHOSPHORUS: CPT

## 2021-01-07 PROCEDURE — 74176 CT ABD & PELVIS W/O CONTRAST: CPT

## 2021-01-07 PROCEDURE — 99285 EMERGENCY DEPT VISIT HI MDM: CPT | Mod: 25

## 2021-01-07 PROCEDURE — 83550 IRON BINDING TEST: CPT

## 2021-01-07 PROCEDURE — 82728 ASSAY OF FERRITIN: CPT

## 2021-01-07 PROCEDURE — 80061 LIPID PANEL: CPT

## 2021-01-07 PROCEDURE — 82962 GLUCOSE BLOOD TEST: CPT

## 2021-01-07 PROCEDURE — 82607 VITAMIN B-12: CPT

## 2021-01-07 PROCEDURE — 85730 THROMBOPLASTIN TIME PARTIAL: CPT

## 2021-01-07 PROCEDURE — 82985 ASSAY OF GLYCATED PROTEIN: CPT

## 2021-01-07 RX ORDER — INSULIN LISPRO 100/ML
1 VIAL (ML) SUBCUTANEOUS
Qty: 5 | Refills: 0
Start: 2021-01-07

## 2021-01-07 RX ORDER — INSULIN HUMAN 100 [IU]/ML
0 INJECTION, SOLUTION SUBCUTANEOUS
Qty: 0 | Refills: 0 | DISCHARGE

## 2021-01-07 RX ORDER — SUCRALFATE 1 G
10 TABLET ORAL
Qty: 0 | Refills: 0 | DISCHARGE
Start: 2021-01-07

## 2021-01-07 RX ORDER — METOPROLOL TARTRATE 50 MG
0.5 TABLET ORAL
Qty: 0 | Refills: 0 | DISCHARGE

## 2021-01-07 RX ORDER — CALCIUM ACETATE 667 MG
1 TABLET ORAL
Qty: 0 | Refills: 0 | DISCHARGE
Start: 2021-01-07

## 2021-01-07 RX ADMIN — PANTOPRAZOLE SODIUM 40 MILLIGRAM(S): 20 TABLET, DELAYED RELEASE ORAL at 06:22

## 2021-01-07 RX ADMIN — Medication 1 GRAM(S): at 06:22

## 2021-01-07 NOTE — DISCHARGE NOTE PROVIDER - CARE PROVIDER_API CALL
Marcos Mosher  INTERNAL MEDICINE  [address] [place] [address]  Phone: [phone]  Fax: [phone]  Established Patient  Follow Up Time: 1-3 days    Dario [place])  [place] [address]  Phone: [phone]  Fax: [phone]  Established Patient  Follow Up Time: 1 week

## 2021-01-07 NOTE — PHYSICAL THERAPY INITIAL EVALUATION ADULT - GENERAL OBSERVATIONS, REHAB EVAL
Patient received in bed, in supine position with covers over head. Patient agreed to physical therapy intervention. Patient prostethic next to bed. Patient has unproductive cough.

## 2021-01-07 NOTE — DISCHARGE NOTE PROVIDER - NSDCCPCAREPLAN_GEN_ALL_CORE_FT
PRINCIPAL DISCHARGE DIAGNOSIS  Diagnosis: Esophagitis  Assessment and Plan of Treatment: You were previously diagnosed to have gastroesophageal reflux disease which means gastric acid can flow bakc up through your esophagus and cause symptoms such as chest pain, indigestion, vomiting, nausea, or abdominal pain. Chronic untreated GERD can result in ulcers in your stomach or duodenum, and metaplastic changes in the lower part of your esopahgus. GERD with red flag symptoms such as dysphagia/ odynophagia (pain or trouble swallowing), unintended weight loss, early satiety or vomiting  , aspiration  , wheezing, cough  , or gastrointestinal bleeding.        SECONDARY DISCHARGE DIAGNOSES  Diagnosis: ESRD on hemodialysis  Assessment and Plan of Treatment: You were found to have a worsening of your kidney function at baseline, which we diagnosed by looking at your blood work values including BUN/ serum creratinine. Your baseline Serum Creatinine was found to be around 1.7- 2.0; you were monitored after restarted a blood pressure medication called lisinopril, but did not develop any kidney injury. You were treated with IV hydration, and your renal function improved back to your baseline. We held nephrotoxic medications such as NSAID pain killers, and were cautious about the use of IV contrast if such scans were needed. You are advised to continue to stay well hydrated, and to seek medical attention if you have painful/burning urination, blood in urine, or increasing inability to control the flow of urine.      Diagnosis: Hypertensive urgency  Assessment and Plan of Treatment: You have high blood pressure, for which you have been started on medications. It is important to continue to take your medications on time,  monitor your blood pressure at home, keep a log of your home readings and follow up with your Primary Care Physician. As per AHA/ACC guidelines, it is important to adhere to a DASH Diet of fresh fruits, vegetables, lean meats such as poultry and fish, and whole wheat carbs. 30 minutes of aerobic exercise per day 3-4 times a week, reducing salt intake <1.5g/day, and cutting down on highly processed foods are also shown to reduce BP. Uncontrolled BP may result in organ damage to your eyes, brain, heart, and kidneys by causing strokes, heart attacks, kidney failure, and bleeds in your eye.      Diagnosis: Diabetes mellitus  Assessment and Plan of Treatment: You have Type 2 diabetes and were found to have a Hemoglobin A1c of 6.6% which shows a pretty well controlled glucose levels in your body over the past 3 months. However, a normal HbA1c is 5.5%, and you must make some lifestyle choices such as exercise and weight loss in order to make your body less insulin resistant. You are advised to eat foods that are low glycemic index which include beans and complex carbs, and to avoid high GI foods including white rice and potatoes. Uncontrolled sugars can lead to blindness, kidney failure, and contribute to diseases such as heart attacks and strokes. Your were not started on any medications at this hospital visit, and you are advised to keep your Primary Care Physician appointments in order to monitor and change medications as necessary.      Diagnosis: Anemia  Assessment and Plan of Treatment: You were found to have a low hemoglobin on admission, and further anemia studies shoed a low total iron, iron saturation, and TIBC. This most likely is because your body has low iron stores and cannot make hemoglobin which helps your cells carry oxygen.  You were not found to be actively bleeding, and your bloodwork did not show any abnormalities in clotting. You were started on iron supplementation, and are recommended to continue taking one iron pill daily. Common side effects include blackish discoloration of stools, constipation, and nausea.      Diagnosis: Encounter for general psychiatric examination, requested by authority  Assessment and Plan of Treatment:     Diagnosis: HLD (hyperlipidemia)  Assessment and Plan of Treatment: You have a history of high blood fats and were on two medications at home including a STATIN and FENOFIBRATE. You were continued on the statin during this hospital admission. Your blood tests showed well controlled lipid levels in your body. You are advised to continue taking this medication daily and seek medical attention if you have intense muscle aches, or reddish discoloration of the urine.       PRINCIPAL DISCHARGE DIAGNOSIS  Diagnosis: Esophagitis  Assessment and Plan of Treatment: You presented with intractable N/V which was partly due to kidney failure and partly due to  previously diagnosed gastroesophageal reflux disease. You started to have reddish/ brownish vomitus, for which EGD was done and showed esophagitis, with no signs of H pylori or candida. You were started on a regimen including  PPI and carafate.   Chronic untreated GERD can result in ulcers in your stomach or duodenum, and metaplastic changes in the lower part of your esopahgus. Please seek medical attention for GERD with red flag symptoms such as dysphagia/ odynophagia (pain or trouble swallowing), unintended weight loss, early satiety or vomiting , aspiration, wheezing, cough, or gastrointestinal bleeding.        SECONDARY DISCHARGE DIAGNOSES  Diagnosis: ESRD on hemodialysis  Assessment and Plan of Treatment: You were found to have a worsening of your kidney function from your baseline of end stage renal failure, which we diagnosed by looking at your blood work values including BUN/ serum creratinine. You were symptomatic with nausea, vomiting, and altered mental status. You were treated with dialysis, and correction of electrolytes, and your renal function improved back to your baseline. We held nephrotoxic medications such as NSAID pain killers, and were cautious about the use of IV contrast if such scans were needed. You are advised to continue to stay well hydrated, and to seek medical attention if you have painful/burning urination, blood in urine, or increasing inability to control the flow of urine.      Diagnosis: Hypertensive urgency  Assessment and Plan of Treatment: You have high blood pressure, for which you had been on home medications. At admission, and during your stay, your BP was very high, reaching SBP of 250's. This improved after dialysis, and your fluid status was improved. On discharge, your BP was found to be low, and it is recommended to HOLD YOUR MEDICATIONS INCLUDING METOPROLOL AND HYDRALAZINE, until you follow up with your nephrologist/ PCP. It is important to continue to monitor your blood pressure at home, keep a log of your home readings and follow up with your Primary Care Physician. As per AHA/ACC guidelines, it is important to adhere to a DASH Diet of fresh fruits, vegetables, lean meats such as poultry and fish, and whole wheat carbs. 30 minutes of aerobic exercise per day 3-4 times a week, reducing salt intake <1.5g/day, and cutting down on highly processed foods are also shown to reduce BP. Uncontrolled BP may result in organ damage to your eyes, brain, heart, and kidneys by causing strokes, heart attacks, kidney failure, and bleeds in your eye.      Diagnosis: Diabetes mellitus  Assessment and Plan of Treatment: You have Type 2 diabetes and were found to have a Hemoglobin A1c of 5.9%, and a fructosamine of 404 which translates to an A1c of 8.8 which shows a pretty well uncontrolled glucose level in your body over the past 3 months. However, a normal HbA1c is 5.5%, and you must make some lifestyle choices such as exercise and weight loss in order to make your body less insulin resistant. You are advised to eat foods that are low glycemic index which include beans and complex carbs, and to avoid high GI foods including white rice and potatoes. Uncontrolled sugars can lead to blindness, kidney failure, and contribute to diseases such as heart attacks and strokes. Your were not started on any medications at this hospital visit, and you are advised to keep your Primary Care Physician appointments in order to monitor and change medications as necessary. PLEASE CONTINUE YOUR DM MEDS AS PRESCRIBED.      Diagnosis: Anemia  Assessment and Plan of Treatment: You were found to have a low hemoglobin on admission, and further anemia studies showed a low total iron, and TIBC, but normal iron saturation, and high ferritin. This most likely is because of anemia of chronic diease/ renal failure. You were not on EPOgen at this admission.  You were not found to be actively bleeding on discharge, and your bloodwork did not show any abnormalities in clotting.       Diagnosis: HLD (hyperlipidemia)  Assessment and Plan of Treatment: You have a history of high blood fats and were on two medications at home including SIMVASTATIN. You were continued on the statin during this hospital admission. Your blood tests showed high TG and low HDL lipid levels in your body. You are advised to continue taking this medication daily and seek medical attention if you have intense muscle aches, or reddish discoloration of the urine.       PRINCIPAL DISCHARGE DIAGNOSIS  Diagnosis: Esophagitis  Assessment and Plan of Treatment: You presented with intractable N/V which was partly due to kidney failure and partly due to  previously diagnosed gastroesophageal reflux disease. You started to have reddish/ brownish vomitus, for which EGD was done and showed esophagitis, with no signs of H pylori or candida. You were started on a regimen including  PPI and carafate.   Chronic untreated GERD can result in ulcers in your stomach or duodenum, and metaplastic changes in the lower part of your esopahgus. Please seek medical attention for GERD with red flag symptoms such as dysphagia/ odynophagia (pain or trouble swallowing), unintended weight loss, early satiety or vomiting , aspiration, wheezing, cough, or gastrointestinal bleeding.        SECONDARY DISCHARGE DIAGNOSES  Diagnosis: Anemia  Assessment and Plan of Treatment: You were found to have a low hemoglobin on admission, and further anemia studies showed a low total iron, and TIBC, but normal iron saturation, and high ferritin. This most likely is because of anemia of chronic diease/ renal failure. You were not on EPOgen at this admission.  You were not found to be actively bleeding on discharge, and your bloodwork did not show any abnormalities in clotting.       Diagnosis: Diabetes mellitus  Assessment and Plan of Treatment: You have Type 2 diabetes and were found to have a Hemoglobin A1c of 5.9%, and a fructosamine of 404 which translates to an A1c of 8.8 which shows an uncontrolled glucose level in your body over the past 3 months. However, a normal HbA1c is 5.5%, and you must make some lifestyle choices such as exercise and weight loss in order to make your body less insulin resistant. You are advised to eat foods that are low glycemic index which include beans and complex carbs, and to avoid high GI foods including white rice and potatoes. Uncontrolled sugars can lead to blindness, kidney failure, and contribute to diseases such as heart attacks and strokes. Your were not started on any medications at this hospital visit, and you are advised to keep your Primary Care Physician appointments in order to monitor and change medications as necessary. PLEASE CONTINUE YOUR DM MEDS AS PRESCRIBED.      Diagnosis: Hypertensive urgency  Assessment and Plan of Treatment: You have high blood pressure, for which you had been on home medications. At admission, and during your stay, your BP was very high, reaching SBP of 250's. This improved after dialysis, and your fluid status was improved. On discharge, your BP was found to be low, and it is recommended to HOLD YOUR MEDICATIONS INCLUDING METOPROLOL AND HYDRALAZINE, until you follow up with your nephrologist/ PCP. It is important to continue to monitor your blood pressure at home, keep a log of your home readings and follow up with your Primary Care Physician. As per AHA/ACC guidelines, it is important to adhere to a DASH Diet of fresh fruits, vegetables, lean meats such as poultry and fish, and whole wheat carbs. 30 minutes of aerobic exercise per day 3-4 times a week, reducing salt intake <1.5g/day, and cutting down on highly processed foods are also shown to reduce BP. Uncontrolled BP may result in organ damage to your eyes, brain, heart, and kidneys by causing strokes, heart attacks, kidney failure, and bleeds in your eye.      Diagnosis: ESRD on hemodialysis  Assessment and Plan of Treatment: You were found to have a worsening of your kidney function from your baseline of end stage renal failure, which we diagnosed by looking at your blood work values including BUN/ serum creratinine. You were symptomatic with nausea, vomiting, and altered mental status. You were treated with dialysis, and correction of electrolytes, and your renal function improved back to your baseline. We held nephrotoxic medications such as NSAID pain killers, and were cautious about the use of IV contrast if such scans were needed. You are advised to continue to stay well hydrated, and to seek medical attention if you have painful/burning urination, blood in urine, or increasing inability to control the flow of urine.      Diagnosis: HLD (hyperlipidemia)  Assessment and Plan of Treatment: You have a history of high blood fats and were on two medications at home including SIMVASTATIN. You were continued on the statin during this hospital admission. Your blood tests showed high TG and low HDL lipid levels in your body. You are advised to continue taking this medication daily and seek medical attention if you have intense muscle aches, or reddish discoloration of the urine.

## 2021-01-07 NOTE — DISCHARGE NOTE NURSING/CASE MANAGEMENT/SOCIAL WORK - NSDCFUADDAPPT_GEN_ALL_CORE_FT
Outpatient hemodialysis reinstated for Saturday 1/9/2021 at [facility].

## 2021-01-07 NOTE — DISCHARGE NOTE PROVIDER - PROVIDER TOKENS
PROVIDER:[TOKEN:[[id number]],FOLLOWUP:[1-3 days],ESTABLISHEDPATIENT:[T]],PROVIDER:[TOKEN:[[id number]],FOLLOWUP:[1 week],ESTABLISHEDPATIENT:[T]]

## 2021-01-07 NOTE — PHYSICAL THERAPY INITIAL EVALUATION ADULT - PHYSICAL ASSIST/NONPHYSICAL ASSIST: STAND/SIT, REHAB EVAL
VC's required to reach back for bed before sitting down. With fair carry over./supervision/verbal cues

## 2021-01-07 NOTE — PHYSICAL THERAPY INITIAL EVALUATION ADULT - PHYSICAL ASSIST/NONPHYSICAL ASSIST: GAIT, REHAB EVAL
Patient requires verbal cues during gait to negotitate through hallway because of poor vision./supervision/verbal cues

## 2021-01-07 NOTE — DISCHARGE NOTE PROVIDER - HOSPITAL COURSE
Patient is a 60M from [place] [address], with PMH of ESRD on HD TTS, HTN, DM, partially blind and s/p left BKA, who presented to the ED due to vomiting. Patient states that he was feeling fine until today when he started to have nausea and multiple episodes of vomiting. Patient unable to state exactly how many episodes he had. Denies any signs of blood as far as he knows. Patient also had some abdominal pain mainly located on RLQ but states it is improved now. Patient states that he has had similar episodes previously and has gone to hospital but unsure exactly about what is the cause of symptoms. Patient states he normally has HD sessions on T/T/S but it was changed due to the holidays, and he missed another session due to feeling sick. Currently, patient denies any chest pain, shortness of breath, headache, dizziness or abdominal pain. He was admitted for management of intractable vomiting which is likely 2/2 to diabetic gastroparesis vs electrolyte derangements. He was mildly improved on Reglan, and was not able to tolerate the entire session of dialysis 12/28. After this, he refused AM meds and blood draws, and his BP remained elevated due to rebound HTN. Clonidine PO was made patch and labetalol 5mg push given with hydralazine PRN pushes. Pt now has IV access, clonidine patch has been D'christy and BP is better as he has restarted full sessions of HD (last UF removed as pt volume depleted). His brother and HCP (Georgi King) was contacted and reaffirmed [facility] as full code status. Patient is a 60M from [place] [address], with PMH of ESRD on HD TTS, HTN, DM, partially blind and s/p left BKA, who presented to the ED due to vomiting. Patient states that he was feeling fine until today when he started to have nausea and multiple episodes of vomiting. Patient unable to state exactly how many episodes he had, but denies any signs of blood in the vomitus on admission. Patient also had some abdominal pain mainly located on RLQ but states it is improved now. Patient states that he has had similar episodes previously and has gone to hospital but unsure exactly about what is the cause of symptoms. Patient states he normally has HD sessions on T/T/S but he had missed two sessions due to the holidays/ feeling sick. He was admitted for management of intractable vomiting which is likely 2/2 to diabetic gastroparesis vs untreated uremia. He was mildly improved on Reglan, and was intermittently accepting and refusing dialysis treatments. After this, he refused AM meds and blood draws, and his BP remained elevated due to rebound HTN. Clonidine PO was made patch and labetalol 5mg push given with hydralazine PRN pushes. Pt now has IV access, clonidine patch has been D'christy and BP is better as he has restarted full sessions of HD (last UF removed as pt volume depleted). His brother and HCP (Georgi Fernando) was contacted and reaffirmed [facility] as full code status. Psych was consulted and said he has no capacity to decline dialysis as it is a lifesaving measure. Pt started having coffee ground emesis and went for an endoscopy which found Esophagitis and he was started on PPI BID with liquid carafate 1 gm QID. His diet was advanced and he eventually started to tolerate soft diet. His lab parameters improved greatly with dialysis and the patient's vomiting/ nausea/ and retching symptomatically improved. PT evaluated and recommended that he can benefit from home with home PT, script provided. at discharge, he had moderate insight about his health and understood the prognosis, and severity of his condition. Given patient's improved clinical status and current hemodynamic stability, decision was made to discharge after discussing with attending physician. Please refer to patient's complete medical chart with documents for a full hospital course, for this is only a brief summary. Patient is a 60M from [place] [address], with PMH of ESRD on HD TTS, HTN, DM, partially blind and s/p left BKA, who presented to the ED due to vomiting. Patient states that he was feeling fine until today when he started to have nausea and multiple episodes of vomiting. Patient unable to state exactly how many episodes he had, but denies any signs of blood in the vomitus on admission. Patient also had some abdominal pain mainly located on RLQ but states it is improved now. Patient states that he has had similar episodes previously and has gone to hospital but unsure exactly about what is the cause of symptoms. Patient states he normally has HD sessions on T/T/S but he had missed two sessions due to the holidays/ feeling sick. He was admitted for management of intractable vomiting which is likely 2/2 to diabetic gastroparesis vs untreated uremia. He was mildly improved on Reglan, and was intermittently accepting and refusing dialysis treatments. After this, he refused AM meds and blood draws, and his BP remained elevated due to rebound HTN. Clonidine PO was made patch and labetalol 5mg push given with hydralazine PRN pushes. Pt now has IV access, clonidine patch has been D'christy and BP is better as he has restarted full sessions of HD (last UF removed as pt volume depleted). His brother and HCP (Georgi Fernando) was contacted and reaffirmed [facility] as full code status. Psych was consulted and said he has no capacity to decline dialysis as it is a lifesaving measure. Pt started having coffee ground emesis and went for an endoscopy which found Esophagitis and he was started on PPI BID with liquid carafate 1 gm QID. His diet was advanced and he eventually started to tolerate soft diet. His lab parameters improved greatly with dialysis and the patient's vomiting/ nausea/ and retching symptomatically improved. PT evaluated and recommended that he can benefit from home with home PT, script provided. at discharge, he had moderate insight about his health and understood the prognosis, and severity of his condition.     Given patient's improved clinical status and current hemodynamic stability, decision was made to discharge after discussing with attending physician. Please refer to patient's complete medical chart with documents for a full hospital course, for this is only a brief summary.

## 2021-01-07 NOTE — PHYSICAL THERAPY INITIAL EVALUATION ADULT - ADDITIONAL COMMENTS
Patient has below the knee prosthetic. Patient is able to don and doff prosthetic independently. Patient stump sleeve is loose fitting over stump.
Pt is nonverbal- baseline as per daughter. Information obtained from daughter and .

## 2021-01-07 NOTE — DISCHARGE NOTE NURSING/CASE MANAGEMENT/SOCIAL WORK - PATIENT PORTAL LINK FT
You can access the FollowMyHealth Patient Portal offered by [facility] by registering at the following website: [email or web address]. By joining [facility]’s FollowMyHealth portal, you will also be able to view your health information using other applications (apps) compatible with our system.

## 2021-01-07 NOTE — DISCHARGE NOTE PROVIDER - NSDCMRMEDTOKEN_GEN_ALL_CORE_FT
folic acid 1 mg oral tablet: 1 tab(s) orally once a day  insulin regular (concentrated) 500 units/mL human recombinant subcutaneous solution:   lactulose 20 g oral powder for reconstitution: 1 each orally once a day  lidocaine 0.5% topical cream:   Lokelma 10 g oral powder for reconstitution: 1 packet(s) orally once a day  Metoprolol Tartrate 25 mg oral tablet: 0.5 tab(s) orally 2 times a day  NovoLIN 70/30 subcutaneous suspension: 8 unit(s) subcutaneous once a day  Pamelor 10 mg oral capsule: 1 cap(s) orally once a day  Percocet 10/325 oral tablet: 1 tab(s) orally 2 times a day, As Needed  Protonix 40 mg oral delayed release tablet: 1 tab(s) orally 2 times a day  Reglan 5 mg oral tablet: 1 tab(s) orally 3 times a day (with meals)  Renvela 800 mg oral tablet: 2 tab(s) orally 3 times a day (with meals)  Tylenol 500 mg oral tablet: 2 tab(s) orally 2 times a day, As Needed  Vitamin B-12 1000 mcg oral tablet: 1 tab(s) orally once a day  Vitamin D3 1000 intl units (25 mcg) oral tablet: 1 tab(s) orally once a day  Zocor 40 mg oral tablet: 1 tab(s) orally once a day (at bedtime)   calcium acetate 667 mg oral tablet: 1  orally once a day  folic acid 1 mg oral tablet: 1 tab(s) orally once a day  insulin regular (concentrated) 500 units/mL human recombinant subcutaneous solution:   lactulose 20 g oral powder for reconstitution: 1 each orally once a day  lidocaine 0.5% topical cream:   Lokelma 10 g oral powder for reconstitution: 1 packet(s) orally once a day  Metoprolol Tartrate 25 mg oral tablet: 0.5 tab(s) orally 2 times a day  NovoLIN 70/30 subcutaneous suspension: 8 unit(s) subcutaneous once a day  Pamelor 10 mg oral capsule: 1 cap(s) orally once a day  Percocet 10/325 oral tablet: 1 tab(s) orally 2 times a day, As Needed  Protonix 40 mg oral delayed release tablet: 1 tab(s) orally 2 times a day  Reglan 5 mg oral tablet: 1 tab(s) orally 3 times a day (with meals)  Renvela 800 mg oral tablet: 2 tab(s) orally 3 times a day (with meals)  sucralfate 1 g/10 mL oral suspension: 10 milliliter(s) orally 4 times a day  Tylenol 500 mg oral tablet: 2 tab(s) orally 2 times a day, As Needed  Vitamin B-12 1000 mcg oral tablet: 1 tab(s) orally once a day  Vitamin D3 1000 intl units (25 mcg) oral tablet: 1 tab(s) orally once a day  Zocor 40 mg oral tablet: 1 tab(s) orally once a day (at bedtime)   calcium acetate 667 mg oral tablet: 1  orally once a day  folic acid 1 mg oral tablet: 1 tab(s) orally once a day  insulin lispro 100 units/mL injectable solution: 1 unit(s) injectable 3 times a day (with meals)   151-200 1 Unit  201-250 2 Unit  251-300 3 Unit  301-350 4 Unit  351-400 5 Unit  401-450 6 Unit  lactulose 20 g oral powder for reconstitution: 1 each orally once a day  lidocaine 0.5% topical cream:   Lokelma 10 g oral powder for reconstitution: 1 packet(s) orally once a day  NovoLIN 70/30 subcutaneous suspension: 8 unit(s) subcutaneous once a day  Pamelor 10 mg oral capsule: 1 cap(s) orally once a day  Percocet 10/325 oral tablet: 1 tab(s) orally 2 times a day, As Needed  Protonix 40 mg oral delayed release tablet: 1 tab(s) orally 2 times a day  Reglan 5 mg oral tablet: 1 tab(s) orally 3 times a day (with meals)  Renvela 800 mg oral tablet: 2 tab(s) orally 3 times a day (with meals)  sucralfate 1 g/10 mL oral suspension: 10 milliliter(s) orally 4 times a day  Tylenol 500 mg oral tablet: 2 tab(s) orally 2 times a day, As Needed  Vitamin B-12 1000 mcg oral tablet: 1 tab(s) orally once a day  Vitamin D3 1000 intl units (25 mcg) oral tablet: 1 tab(s) orally once a day  Zocor 40 mg oral tablet: 1 tab(s) orally once a day (at bedtime)   calcium acetate 667 mg oral tablet: 1  orally once a day  folic acid 1 mg oral tablet: 1 tab(s) orally once a day  insulin lispro 100 units/mL injectable solution: 1 unit(s) injectable 3 times a day (with meals)   151-200 1 Unit  201-250 2 Unit  251-300 3 Unit  301-350 4 Unit  351-400 5 Unit  401-450 6 Unit  lactulose 20 g oral powder for reconstitution: 1 each orally once a day  lidocaine 0.5% topical cream:   Lokelma 10 g oral powder for reconstitution: 1 packet(s) orally once a day  NovoLIN 70/30 subcutaneous suspension: 8 unit(s) subcutaneous once a day  Pamelor 10 mg oral capsule: 1 cap(s) orally once a day  Percocet 10/325 oral tablet: 1 tab(s) orally 2 times a day, As Needed  Physical therapy: 2-3 times a week to address  aerobic capacity/endurance; muscle strength; gait, locomotion, and balance  Protonix 40 mg oral delayed release tablet: 1 tab(s) orally 2 times a day  Reglan 5 mg oral tablet: 1 tab(s) orally 3 times a day (with meals)  Renvela 800 mg oral tablet: 2 tab(s) orally 3 times a day (with meals)  sucralfate 1 g/10 mL oral suspension: 10 milliliter(s) orally 4 times a day  Tylenol 500 mg oral tablet: 2 tab(s) orally 2 times a day, As Needed  Vitamin B-12 1000 mcg oral tablet: 1 tab(s) orally once a day  Vitamin D3 1000 intl units (25 mcg) oral tablet: 1 tab(s) orally once a day  Zocor 40 mg oral tablet: 1 tab(s) orally once a day (at bedtime)

## 2021-01-07 NOTE — PHYSICAL THERAPY INITIAL EVALUATION ADULT - LIVES WITH, PROFILE
Patient has an aide from 6:30 am to 3:00 pm everyday of the week except Sunday and Monday./alone Patient has an in home aide from 6:30 am to 3:00 pm everyday of the week except Sunday and Monday./alone

## 2021-01-07 NOTE — PHYSICAL THERAPY INITIAL EVALUATION ADULT - GAIT DEVIATIONS NOTED, PT EVAL
Forward flexed posture/decreased jenise/decreased step length/decreased stride length Forward flexed head and shoulder posture/decreased jenise/decreased step length/decreased stride length

## 2021-01-07 NOTE — DISCHARGE NOTE PROVIDER - NSDCFUSCHEDAPPT_GEN_ALL_CORE_FT
KIAN VALERO ; 01/25/2021 ; [facility] KIAN Estrada ; 01/25/2021 ; [facility] [address]

## 2021-01-07 NOTE — PHYSICAL THERAPY INITIAL EVALUATION ADULT - GAIT DISTANCE, PT EVAL
Patient able to ambulate 50 feet with rollator. Patient requiresd 2 seated rest breaks secondary to dizziness./50 feet Patient able to ambulate 50 feet with rollator. Patient required 2 seated rest breaks secondary to dizziness./50 feet

## 2021-01-07 NOTE — PHYSICAL THERAPY INITIAL EVALUATION ADULT - PERTINENT HX OF CURRENT PROBLEM, REHAB EVAL
Patient is a 60 year old male from [facility], presenting to [facility] with vomiting. Patient has a PMH of ESRD on HD, HTN, DM, HLD. Patient is partially blind, with BKA. Patient is a 60 year old male from [facility], presenting to [facility] with vomiting. Patient has a PMH of ESRD on HD, HTN, DM, HLD. Patient is partially blind, with L BKA.

## 2021-01-12 ENCOUNTER — INPATIENT (INPATIENT)
Facility: HOSPITAL | Age: 61
LOS: 1 days | Discharge: NURSING, REHAB OR LONG-TERM CARE | DRG: 391 | End: 2021-01-14
Attending: INTERNAL MEDICINE | Admitting: INTERNAL MEDICINE
Payer: MEDICAID

## 2021-01-12 VITALS
HEART RATE: 104 BPM | OXYGEN SATURATION: 98 % | HEIGHT: 62 IN | TEMPERATURE: 98 F | SYSTOLIC BLOOD PRESSURE: 121 MMHG | DIASTOLIC BLOOD PRESSURE: 91 MMHG | RESPIRATION RATE: 16 BRPM

## 2021-01-12 DIAGNOSIS — Z98.890 OTHER SPECIFIED POSTPROCEDURAL STATES: Chronic | ICD-10-CM

## 2021-01-12 DIAGNOSIS — Z98.41 CATARACT EXTRACTION STATUS, RIGHT EYE: Chronic | ICD-10-CM

## 2021-01-12 DIAGNOSIS — Z98.42 CATARACT EXTRACTION STATUS, LEFT EYE: Chronic | ICD-10-CM

## 2021-01-12 DIAGNOSIS — Z89.512 ACQUIRED ABSENCE OF LEFT LEG BELOW KNEE: Chronic | ICD-10-CM

## 2021-01-12 NOTE — ED PROVIDER NOTE - PROGRESS NOTE DETAILS
patient refusing blood work and EKG and requesting to be sent back to [facility]. States we stuck him too many times. Discussed with Dr. abarca and patient will likely need higher care- nursing home. patient discharged by Dr. Larry and awaiting ambulance transport back to [place]. When ambulance arrived to transport patient back to [place], repeat VS showed SBP 60s. patient sleepy but continues to berbalize refusal of workup in ED. Discussed above with Dr. De La Torre who reports though patient has hx low Bps never as low as 60s systolic, agrees with plan to give haldol Im for agitation and complete ED workup in ED. AIYANA Adams MD labs show wbc wnl, K wnl, lactate 2.2, CXR shows no focal infiltrate  on reeval BP improved to 96/63 with 1L NS bolus. will admit for hypotension likely combination of HD yesterday and vomiting, will need slow IV hydration. patient stable for admission. AIYANA Adams MD Patient discharged by Dr. Larry and awaiting ambulance transport back to [place]. When ambulance arrived to transport patient back to [place], repeat VS showed SBP 60s. patient sleepy but continues to verbalize refusal of workup in ED. Discussed above with Dr. De La Torre who reports that patient has hx low BPs never as low as 60s systolic, agrees with plan to give haldol Im for agitation and complete ED workup in ED. AIYANA Adams MD

## 2021-01-12 NOTE — ED PROVIDER NOTE - MUSCULOSKELETAL, MLM
Spine appears normal, range of motion is not limited, no muscle or joint tenderness RUE fistula, + bruit, + thrill, left BKA

## 2021-01-12 NOTE — ED PROVIDER NOTE - CONSTITUTIONAL, MLM
Well appearing, awake, alert, oriented to person, place, time/situation and in no apparent distress. normal... awake, alert, oriented to person, place, time/situation and in no apparent distress.

## 2021-01-12 NOTE — ED PROVIDER NOTE - OBJECTIVE STATEMENT
60 y.o male with a PMHx of ESRD , hemodialysis on Tues, Thurs , Sat, HLD, CAD, osteoporosis and A PSHx of a AV fistula and BTK amputation presents to the ED from [facility] for weakness. Per EMS , patient was reported to have dialysis today but he felt dizzy and vomited x1 yesterday. Patient states he just has diffused weakness and denies dizziness, nausea and vomiting. Patient was d/c on Dec 7th 2020 for intractable vomiting, possibly secondary to gastroparesis uremia. Patient is a poor historian . Patient is alert and oriented x3. NKDA 60 y.o male with a PMHx of ESRD , hemodialysis on Tues, Thurs ,Sat, HLD, CAD, osteoporosis and A PSHx of a AV fistula and BTK amputation presents to the ED from [facility] for weakness. Per EMS, patient was reported to have dialysis today but he felt dizzy and vomited x 3 yesterday. Patient states he just has diffused weakness and denies dizziness, nausea and vomiting. Patient was d/c on Jan 7th 2020 for intractable vomiting, possibly secondary to gastroparesis/uremia. Patient is a poor historian . Patient is alert and oriented x3. NKDA

## 2021-01-12 NOTE — ED PROVIDER NOTE - INPATIENT RESIDENT/ACP NOTIFIED DATE
Patient has an implanted port per Hanh Fischer which she states was verified by IV team as an infusible site. Case cancelled.
13-Jan-2021 03:48

## 2021-01-12 NOTE — ED PROVIDER NOTE - CLINICAL SUMMARY MEDICAL DECISION MAKING FREE TEXT BOX
59 yo M here with weakness. patient is a poor historian and refusing blood work, EKG and further evaluation upon arrival. AAOX 3. Denying nausea, dizziness, or further complaints. Patient is requesting to be sent back to [facility].  Discussed with PMD, Dr. abarca and aware.

## 2021-01-12 NOTE — ED PROVIDER NOTE - CARE PLAN
Principal Discharge DX:	Health maintenance examination   Principal Discharge DX:	Hypotension  Secondary Diagnosis:	ESRD on hemodialysis

## 2021-01-12 NOTE — ED PROVIDER NOTE - CARE PROVIDER_API CALL
Dario De La Torre (MD)  Medicine  [address] [place] [address]  Phone: [phone]  Fax: [phone]  Follow Up Time: Routine

## 2021-01-12 NOTE — ED PROVIDER NOTE - PATIENT PORTAL LINK FT
You can access the FollowMyHealth Patient Portal offered by [facility] by registering at the following website: [email or web address]. By joining [facility]’s FollowMyHealth portal, you will also be able to view your health information using other applications (apps) compatible with our system.

## 2021-01-12 NOTE — ED PROVIDER NOTE - NSFOLLOWUPINSTRUCTIONS_ED_ALL_ED_FT
Please follow up with your primary care doctor. Please return to the Emergency Department for worsening signs or symptoms.

## 2021-01-13 DIAGNOSIS — N18.6 END STAGE RENAL DISEASE: ICD-10-CM

## 2021-01-13 DIAGNOSIS — E11.9 TYPE 2 DIABETES MELLITUS WITHOUT COMPLICATIONS: ICD-10-CM

## 2021-01-13 DIAGNOSIS — Z29.9 ENCOUNTER FOR PROPHYLACTIC MEASURES, UNSPECIFIED: ICD-10-CM

## 2021-01-13 DIAGNOSIS — I95.9 HYPOTENSION, UNSPECIFIED: ICD-10-CM

## 2021-01-13 LAB
ALBUMIN SERPL ELPH-MCNC: 2.9 G/DL — LOW (ref 3.5–5)
ALP SERPL-CCNC: 89 U/L — SIGNIFICANT CHANGE UP (ref 40–120)
ALT FLD-CCNC: 10 U/L DA — SIGNIFICANT CHANGE UP (ref 10–60)
ANION GAP SERPL CALC-SCNC: 14 MMOL/L — SIGNIFICANT CHANGE UP (ref 5–17)
AST SERPL-CCNC: 7 U/L — LOW (ref 10–40)
BASOPHILS # BLD AUTO: 0.02 K/UL — SIGNIFICANT CHANGE UP (ref 0–0.2)
BASOPHILS NFR BLD AUTO: 0.4 % — SIGNIFICANT CHANGE UP (ref 0–2)
BILIRUB SERPL-MCNC: 0.3 MG/DL — SIGNIFICANT CHANGE UP (ref 0.2–1.2)
BUN SERPL-MCNC: 45 MG/DL — HIGH (ref 7–18)
CALCIUM SERPL-MCNC: 7.8 MG/DL — LOW (ref 8.4–10.5)
CHLORIDE SERPL-SCNC: 97 MMOL/L — SIGNIFICANT CHANGE UP (ref 96–108)
CO2 SERPL-SCNC: 22 MMOL/L — SIGNIFICANT CHANGE UP (ref 22–31)
CREAT SERPL-MCNC: 14.7 MG/DL — HIGH (ref 0.5–1.3)
EOSINOPHIL # BLD AUTO: 0.04 K/UL — SIGNIFICANT CHANGE UP (ref 0–0.5)
EOSINOPHIL NFR BLD AUTO: 0.8 % — SIGNIFICANT CHANGE UP (ref 0–6)
GLUCOSE BLDC GLUCOMTR-MCNC: 163 MG/DL — HIGH (ref 70–99)
GLUCOSE SERPL-MCNC: 232 MG/DL — HIGH (ref 70–99)
HCT VFR BLD CALC: 35.9 % — LOW (ref 39–50)
HGB BLD-MCNC: 11.2 G/DL — LOW (ref 13–17)
IMM GRANULOCYTES NFR BLD AUTO: 0.6 % — SIGNIFICANT CHANGE UP (ref 0–1.5)
LACTATE SERPL-SCNC: 2.2 MMOL/L — HIGH (ref 0.7–2)
LIDOCAIN IGE QN: 339 U/L — SIGNIFICANT CHANGE UP (ref 73–393)
LYMPHOCYTES # BLD AUTO: 0.7 K/UL — LOW (ref 1–3.3)
LYMPHOCYTES # BLD AUTO: 14.3 % — SIGNIFICANT CHANGE UP (ref 13–44)
MAGNESIUM SERPL-MCNC: 2.3 MG/DL — SIGNIFICANT CHANGE UP (ref 1.6–2.6)
MCHC RBC-ENTMCNC: 28.6 PG — SIGNIFICANT CHANGE UP (ref 27–34)
MCHC RBC-ENTMCNC: 31.2 GM/DL — LOW (ref 32–36)
MCV RBC AUTO: 91.6 FL — SIGNIFICANT CHANGE UP (ref 80–100)
MONOCYTES # BLD AUTO: 0.46 K/UL — SIGNIFICANT CHANGE UP (ref 0–0.9)
MONOCYTES NFR BLD AUTO: 9.4 % — SIGNIFICANT CHANGE UP (ref 2–14)
NEUTROPHILS # BLD AUTO: 3.65 K/UL — SIGNIFICANT CHANGE UP (ref 1.8–7.4)
NEUTROPHILS NFR BLD AUTO: 74.5 % — SIGNIFICANT CHANGE UP (ref 43–77)
NRBC # BLD: 0 /100 WBCS — SIGNIFICANT CHANGE UP (ref 0–0)
PHOSPHATE SERPL-MCNC: 4.8 MG/DL — HIGH (ref 2.5–4.5)
PLATELET # BLD AUTO: 195 K/UL — SIGNIFICANT CHANGE UP (ref 150–400)
POTASSIUM SERPL-MCNC: 3.8 MMOL/L — SIGNIFICANT CHANGE UP (ref 3.5–5.3)
POTASSIUM SERPL-SCNC: 3.8 MMOL/L — SIGNIFICANT CHANGE UP (ref 3.5–5.3)
PROT SERPL-MCNC: 7.6 G/DL — SIGNIFICANT CHANGE UP (ref 6–8.3)
RBC # BLD: 3.92 M/UL — LOW (ref 4.2–5.8)
RBC # FLD: 17.1 % — HIGH (ref 10.3–14.5)
SARS-COV-2 RNA SPEC QL NAA+PROBE: SIGNIFICANT CHANGE UP
SODIUM SERPL-SCNC: 133 MMOL/L — LOW (ref 135–145)
TROPONIN I SERPL-MCNC: <0.015 NG/ML — SIGNIFICANT CHANGE UP (ref 0–0.04)
WBC # BLD: 4.9 K/UL — SIGNIFICANT CHANGE UP (ref 3.8–10.5)
WBC # FLD AUTO: 4.9 K/UL — SIGNIFICANT CHANGE UP (ref 3.8–10.5)

## 2021-01-13 PROCEDURE — 71045 X-RAY EXAM CHEST 1 VIEW: CPT | Mod: 26

## 2021-01-13 PROCEDURE — 99283 EMERGENCY DEPT VISIT LOW MDM: CPT

## 2021-01-13 RX ORDER — CEFAZOLIN SODIUM 1 G
2 VIAL (EA) INJECTION
Qty: 0 | Refills: 0 | DISCHARGE

## 2021-01-13 RX ORDER — ACETAMINOPHEN 500 MG
3 TABLET ORAL
Qty: 0 | Refills: 0 | DISCHARGE

## 2021-01-13 RX ORDER — HEPARIN SODIUM 5000 [USP'U]/ML
5000 INJECTION INTRAVENOUS; SUBCUTANEOUS EVERY 8 HOURS
Refills: 0 | Status: DISCONTINUED | OUTPATIENT
Start: 2021-01-13 | End: 2021-01-14

## 2021-01-13 RX ORDER — SEVELAMER CARBONATE 2400 MG/1
1600 POWDER, FOR SUSPENSION ORAL
Refills: 0 | Status: DISCONTINUED | OUTPATIENT
Start: 2021-01-13 | End: 2021-01-14

## 2021-01-13 RX ORDER — FOLIC ACID 0.8 MG
1 TABLET ORAL DAILY
Refills: 0 | Status: DISCONTINUED | OUTPATIENT
Start: 2021-01-13 | End: 2021-01-14

## 2021-01-13 RX ORDER — DIPHENHYDRAMINE HCL 50 MG
1 CAPSULE ORAL
Qty: 0 | Refills: 0 | DISCHARGE

## 2021-01-13 RX ORDER — ACETAMINOPHEN 500 MG
2 TABLET ORAL
Qty: 0 | Refills: 0 | DISCHARGE

## 2021-01-13 RX ORDER — FERROUS SULFATE 325(65) MG
1 TABLET ORAL
Qty: 0 | Refills: 0 | DISCHARGE

## 2021-01-13 RX ORDER — SODIUM CHLORIDE 9 MG/ML
1000 INJECTION INTRAMUSCULAR; INTRAVENOUS; SUBCUTANEOUS
Refills: 0 | Status: DISCONTINUED | OUTPATIENT
Start: 2021-01-13 | End: 2021-01-13

## 2021-01-13 RX ORDER — SODIUM CHLORIDE 9 MG/ML
1000 INJECTION INTRAMUSCULAR; INTRAVENOUS; SUBCUTANEOUS ONCE
Refills: 0 | Status: COMPLETED | OUTPATIENT
Start: 2021-01-13 | End: 2021-01-13

## 2021-01-13 RX ORDER — CHOLECALCIFEROL (VITAMIN D3) 125 MCG
1 CAPSULE ORAL
Qty: 0 | Refills: 0 | DISCHARGE

## 2021-01-13 RX ORDER — ACETAMINOPHEN 500 MG
500 TABLET ORAL EVERY 12 HOURS
Refills: 0 | Status: DISCONTINUED | OUTPATIENT
Start: 2021-01-13 | End: 2021-01-14

## 2021-01-13 RX ORDER — SODIUM CHLORIDE 9 MG/ML
500 INJECTION INTRAMUSCULAR; INTRAVENOUS; SUBCUTANEOUS
Refills: 0 | Status: DISCONTINUED | OUTPATIENT
Start: 2021-01-13 | End: 2021-01-14

## 2021-01-13 RX ORDER — NORTRIPTYLINE HYDROCHLORIDE 10 MG/1
10 CAPSULE ORAL DAILY
Refills: 0 | Status: DISCONTINUED | OUTPATIENT
Start: 2021-01-13 | End: 2021-01-14

## 2021-01-13 RX ORDER — SUCRALFATE 1 G
1 TABLET ORAL
Refills: 0 | Status: DISCONTINUED | OUTPATIENT
Start: 2021-01-13 | End: 2021-01-14

## 2021-01-13 RX ORDER — LIDOCAINE 4 G/100G
1 CREAM TOPICAL DAILY
Refills: 0 | Status: DISCONTINUED | OUTPATIENT
Start: 2021-01-13 | End: 2021-01-14

## 2021-01-13 RX ORDER — SIMVASTATIN 20 MG/1
40 TABLET, FILM COATED ORAL AT BEDTIME
Refills: 0 | Status: DISCONTINUED | OUTPATIENT
Start: 2021-01-13 | End: 2021-01-14

## 2021-01-13 RX ORDER — SEVELAMER CARBONATE 2400 MG/1
800 POWDER, FOR SUSPENSION ORAL
Refills: 0 | Status: DISCONTINUED | OUTPATIENT
Start: 2021-01-13 | End: 2021-01-14

## 2021-01-13 RX ORDER — ERYTHROPOIETIN 10000 [IU]/ML
1 INJECTION, SOLUTION INTRAVENOUS; SUBCUTANEOUS
Qty: 0 | Refills: 0 | DISCHARGE

## 2021-01-13 RX ORDER — FLUDROCORTISONE ACETATE 0.1 MG/1
1 TABLET ORAL
Qty: 0 | Refills: 0 | DISCHARGE

## 2021-01-13 RX ORDER — INSULIN NPH HUM/REG INSULIN HM 70-30/ML
8 VIAL (ML) SUBCUTANEOUS
Qty: 0 | Refills: 0 | DISCHARGE

## 2021-01-13 RX ORDER — PANTOPRAZOLE SODIUM 20 MG/1
40 TABLET, DELAYED RELEASE ORAL EVERY 12 HOURS
Refills: 0 | Status: DISCONTINUED | OUTPATIENT
Start: 2021-01-13 | End: 2021-01-14

## 2021-01-13 RX ORDER — HALOPERIDOL DECANOATE 100 MG/ML
5 INJECTION INTRAMUSCULAR ONCE
Refills: 0 | Status: DISCONTINUED | OUTPATIENT
Start: 2021-01-13 | End: 2021-01-14

## 2021-01-13 RX ORDER — CEFAZOLIN SODIUM 1 G
3 VIAL (EA) INJECTION
Qty: 0 | Refills: 0 | DISCHARGE

## 2021-01-13 RX ORDER — INSULIN LISPRO 100/ML
VIAL (ML) SUBCUTANEOUS
Refills: 0 | Status: DISCONTINUED | OUTPATIENT
Start: 2021-01-13 | End: 2021-01-14

## 2021-01-13 RX ORDER — PREGABALIN 225 MG/1
1 CAPSULE ORAL
Qty: 0 | Refills: 0 | DISCHARGE

## 2021-01-13 RX ORDER — ONDANSETRON 8 MG/1
4 TABLET, FILM COATED ORAL EVERY 8 HOURS
Refills: 0 | Status: DISCONTINUED | OUTPATIENT
Start: 2021-01-13 | End: 2021-01-14

## 2021-01-13 RX ORDER — MIDODRINE HYDROCHLORIDE 2.5 MG/1
5 TABLET ORAL EVERY 8 HOURS
Refills: 0 | Status: DISCONTINUED | OUTPATIENT
Start: 2021-01-13 | End: 2021-01-14

## 2021-01-13 RX ORDER — SODIUM ZIRCONIUM CYCLOSILICATE 10 G/10G
1 POWDER, FOR SUSPENSION ORAL
Qty: 0 | Refills: 0 | DISCHARGE

## 2021-01-13 RX ORDER — ASPIRIN/CALCIUM CARB/MAGNESIUM 324 MG
1 TABLET ORAL
Qty: 0 | Refills: 0 | DISCHARGE

## 2021-01-13 RX ORDER — LACTULOSE 10 G/15ML
1 SOLUTION ORAL
Qty: 0 | Refills: 0 | DISCHARGE

## 2021-01-13 RX ORDER — METOCLOPRAMIDE HCL 10 MG
1 TABLET ORAL
Qty: 0 | Refills: 0 | DISCHARGE

## 2021-01-13 RX ORDER — GLUCAGON INJECTION, SOLUTION 0.5 MG/.1ML
1 INJECTION, SOLUTION SUBCUTANEOUS
Qty: 0 | Refills: 0 | DISCHARGE

## 2021-01-13 RX ADMIN — SODIUM CHLORIDE 1000 MILLILITER(S): 9 INJECTION INTRAMUSCULAR; INTRAVENOUS; SUBCUTANEOUS at 01:11

## 2021-01-13 RX ADMIN — Medication 1: at 07:47

## 2021-01-13 RX ADMIN — SODIUM CHLORIDE 60 MILLILITER(S): 9 INJECTION INTRAMUSCULAR; INTRAVENOUS; SUBCUTANEOUS at 06:06

## 2021-01-13 NOTE — H&P ADULT - PROBLEM SELECTOR PLAN 2
patient on HD T/T/S  last HD on 1/12 Tue   continue renvela   monitor BMP daily   SW consult.   Nephro Dr. Mosher

## 2021-01-13 NOTE — H&P ADULT - PROBLEM SELECTOR PLAN 4
IMPROVE VTE Individual Risk Assessment  RISK                                                                Points  [  ] Previous VTE                                                  3  [  ] Thrombophilia                                               2  [  ] Lower limb paralysis                                      2        (unable to hold up >15 seconds)    [  ] Current Cancer                                              2         (within 6 months)  [ x ] Immobilization > 24 hrs                                1  [  ] ICU/CCU stay > 24 hours                              1  [  ] Age > 60                                                      1  IMPROVE VTE Score ____1_____  Heparin SC for DVT ppx

## 2021-01-13 NOTE — H&P ADULT - PROBLEM SELECTOR PLAN 1
On ambulance arrival. SBP was 60s.  s/p NS 1L, improved to 93/58  Not making any urine   c/w NS IVF x 12hrs  Monitor BP

## 2021-01-13 NOTE — CONSULT NOTE ADULT - SUBJECTIVE AND OBJECTIVE BOX
[facility] : Progress Note :: [phone], (office [phone]),   Dr Mosher / Dr Washington / Dr Young / Dr Doll / Dr Varsha TURCIOS / Dr Tello / Dr Garcia / Dr Larry qiu  _____________________________________________________________________________________________  Patient is a 60y Male whom presented to the hospital with dizziness and genralized weakness. In ED noted to have severe hypotension SBP 60 prompting a renal consult.  He has ESRD on HD TTS. Non-compliant with HD, yesterday only had 20minutes of HD  In ED BP improved with bolus fluids.   He refuses to answer further questions.  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    Diabetic neuropathy    Diabetes mellitus    Hyperparathyroidism    Spinal stenosis of lumbosacral region    Peripheral vascular disease    HLD (hyperlipidemia)    Coronary artery disease    Glaucoma    Anemia    CKD (chronic kidney disease)    Adrenal insufficiency    Hypertension    S/P arteriovenous (AV) fistula creation  2018    History of left cataract extraction    History of right cataract extraction    Below knee amputation status, left      fish (Rash)  liver (Anaphylaxis)  No Known Drug Allergies    Home Medications Reviewed  Hospital Medications:   MEDICATIONS  (STANDING):  folic acid 1 milliGRAM(s) Oral daily  heparin   Injectable 5000 Unit(s) SubCutaneous every 8 hours  insulin lispro (ADMELOG) corrective regimen sliding scale   SubCutaneous three times a day before meals  midodrine 5 milliGRAM(s) Oral every 8 hours  nortriptyline 10 milliGRAM(s) Oral daily  pantoprazole    Tablet 40 milliGRAM(s) Oral every 12 hours  sevelamer carbonate 1600 milliGRAM(s) Oral three times a day with meals  sevelamer carbonate 800 milliGRAM(s) Oral <User Schedule>  simvastatin 40 milliGRAM(s) Oral at bedtime  sodium chloride 0.9%. 500 milliLiter(s) (60 mL/Hr) IV Continuous <Continuous>  sucralfate suspension 1 Gram(s) Oral four times a day    SOCIAL HISTORY:  Denies ETOh,Smoking,   FAMILY HISTORY:        VITALS:  T(F): 98 (01-13-21 @ 15:25), Max: 98.7 (01-13-21 @ 06:27)  HR: 92 (01-13-21 @ 15:25)  BP: 92/53 (01-13-21 @ 15:25)  RR: 18 (01-13-21 @ 15:25)  SpO2: 97% (01-13-21 @ 15:25)  Wt(kg): --    Height (cm): 157.5 (01-12 @ 21:32)  PHYSICAL EXAM:  Constitutional: NAD  HEENT: anicteric sclera, oropharynx clear.  Neck: No JVD  Respiratory: CTAB, no wheezes, rales or rhonchi  Cardiovascular: S1, S2, RRR  Gastrointestinal: BS+, soft, NT/ND  Extremities: No peripheral edema  Neurological: A/O x 3, no focal deficits  Vascular Access: RUEAVF with thrill and bruit    LABS:  01-13    133<L>  |  97  |  45<H>  ----------------------------<  232<H>  3.8   |  22  |  14.70<H>    Ca    7.8<L>      13 Jan 2021 01:15  Phos  4.8     01-13  Mg     2.3     01-13    TPro  7.6  /  Alb  2.9<L>  /  TBili  0.3  /  DBili      /  AST  7<L>  /  ALT  10  /  AlkPhos  89  01-13    Creatinine Trend: 14.70 <--, 13.50 <--                        11.2   4.90  )-----------( 195      ( 13 Jan 2021 01:15 )             35.9     Urine Studies:      RADIOLOGY & ADDITIONAL STUDIES:

## 2021-01-13 NOTE — CONSULT NOTE ADULT - ASSESSMENT
#ESRD. He did not consent to HD during this hospitalization.  Will re-evaluate in AM. No objection to discharge early in the morning and resume outpt HD at [facility].  # Hypotension. Unclear aetiology. Improved with IVF.  # renal osteodystrophy: cont  sevelamer  # anemia of CKD stable    Thanks for the consult.

## 2021-01-13 NOTE — CHART NOTE - NSCHARTNOTEFT_GEN_A_CORE
EVENT: Diarrhea: Notified by RN that patient having multiple episodes of diarrhea since beginning of shift and was also having episodes prior to her shift. Patient endorses multiple episodes of diarrhea too numerous to count over " the past few days".  Patient had recent CT abdomen which showed esophagitis. Stool PCR and C-diff testing ordered for etiology of diarrhea as it is likely the cause of his presenting symptom of weakness and hypotension. Patient also refusing labs     OBJECTIVE:  Vital Signs Last 24 Hrs  T(C): 36.6 (14 Jan 2021 00:57), Max: 37.1 (13 Jan 2021 06:27)  T(F): 97.9 (14 Jan 2021 00:57), Max: 98.7 (13 Jan 2021 06:27)  HR: 86 (14 Jan 2021 00:57) (84 - 95)  BP: 101/62 (14 Jan 2021 00:57) (91/55 - 101/62)  BP(mean): --  RR: 15 (14 Jan 2021 00:57) (15 - 18)  SpO2: 94% (14 Jan 2021 00:57) (94% - 98%)    FOCUSED PHYSICAL EXAM:    LABS:                        11.2   4.90  )-----------( 195      ( 13 Jan 2021 01:15 )             35.9   CARDIAC MARKERS ( 13 Jan 2021 01:15 )  <0.015 ng/mL / x     / x     / x     / x        01-13    133<L>  |  97  |  45<H>  ----------------------------<  232<H>  3.8   |  22  |  14.70<H>    Ca    7.8<L>      13 Jan 2021 01:15  Phos  4.8     01-13  Mg     2.3     01-13    TPro  7.6  /  Alb  2.9<L>  /  TBili  0.3  /  DBili  x   /  AST  7<L>  /  ALT  10  /  AlkPhos  89  01-13      EKG:   IMGAGING:    ASSESSMENT:  HPI:  60 y.o male from [facility] with a PMHx of ESRD on hemodialysis Tues, Thurs ,Sat, HTN, DM, partially blind, HLD, CAD, osteoporosis and A PSHx of a AV fistula and left BKA presents to the ED from [facility] for weakness. Per EMS, patient was reported to have dialysis today but he felt dizzy and vomited x 3 yesterday. As per ED provider note, patient stated he just had diffuse weakness and denied dizziness, nausea and vomiting. Patient was d/cd on Jan 7th 2020 for intractable vomiting, possibly secondary to gastroparesis/uremia. Patient is a poor historian . Patient is alert and oriented x3. NKDA  Initially pt refused work up and requested to be sent back to the facility and he was planned for DC. When ambulance arrived to transport patient back to [facility], repeat VS showed SBP 60s. Patient was sleepy but continues to verbalize refusal of workup in ED. By ED provider, Haldol was given and work up was done.    In ED,  Pt got 1L NS bolus and BP improved to 93/58. Pt is not co-operative for an exam, but he denied any pain or discomfort.  (13 Jan 2021 03:51)      PLAN:     FOLLOW UP / RESULT: EVENT: Diarrhea: Notified by RN that patient having multiple episodes of diarrhea since beginning of shift and was also having episodes prior to her shift. Patient endorses multiple episodes of diarrhea too numerous to count over " the past few days".  Patient had recent CT abdomen which showed esophagitis for which he was started on Carafate. Patient denies abdominal pain/N/V. Stool PCR and C-diff testing ordered for etiology of diarrhea as it is likely the cause of his presenting symptom of weakness and hypotension. Patient also refusing labs and medications and verbalizes understanding of refusal.    OBJECTIVE:  Vital Signs Last 24 Hrs  T(C): 36.6 (14 Jan 2021 00:57), Max: 37.1 (13 Jan 2021 06:27)  T(F): 97.9 (14 Jan 2021 00:57), Max: 98.7 (13 Jan 2021 06:27)  HR: 86 (14 Jan 2021 00:57) (84 - 95)  BP: 101/62 (14 Jan 2021 00:57) (91/55 - 101/62)  BP(mean): --  RR: 15 (14 Jan 2021 00:57) (15 - 18)  SpO2: 94% (14 Jan 2021 00:57) (94% - 98%)    FOCUSED PHYSICAL EXAM: A&OX3. No distress noted. Breathing unlaboured. Refusing physical exam at this time. At a glance abdomen does not appear distended.      LABS:                        11.2   4.90  )-----------( 195      ( 13 Jan 2021 01:15 )             35.9   CARDIAC MARKERS ( 13 Jan 2021 01:15 )  <0.015 ng/mL / x     / x     / x     / x        01-13    133<L>  |  97  |  45<H>  ----------------------------<  232<H>  3.8   |  22  |  14.70<H>    Ca    7.8<L>      13 Jan 2021 01:15  Phos  4.8     01-13  Mg     2.3     01-13    TPro  7.6  /  Alb  2.9<L>  /  TBili  0.3  /  DBili  x   /  AST  7<L>  /  ALT  10  /  AlkPhos  89  01-13      IMAGING:< from: CT Abdomen and Pelvis No Cont (12.27.20 @ 18:20) >    EXAM:  CT ABDOMEN AND PELVIS                            PROCEDURE DATE:  12/27/2020          INTERPRETATION:  Clinical information: Right lower quadrant pain, nausea and vomiting.    Comparison: 8/26/2019 CT scan of the abdomen and pelvis    PROCEDURE:  CT of the abdomen and pelvis was performed without the administration of oral or intravenous contrast.    Evaluation is limited without oral or intravenous contrast.    FINDINGS:    LOWER CHEST: Subsegmental atelectasis bilaterally.    ABDOMEN:  LIVER: within normal limits.  BILE DUCTS: normal caliber.  GALLBLADDER: Trace amount of sludge or tiny stone.  PANCREAS: Dilatation of the pancreatic duct to 5 mm is unchanged.  SPLEEN: within normal limits.  ADRENALS: Bilateral adrenal gland thickening.  KIDNEYS: within normal limits.    PELVIS:  REPRODUCTIVE ORGANS: no pelvic masses.  URETERS: within normal limits.  BLADDER: Mild diffuse bladder wall thickening.      BOWEL: Again noted is thickening of the distal esophagus which is partially imaged suggesting esophagitis. There are scattered colonic diverticula. There is residual contrast within the colon.  PERITONEUM: no ascites or free air, no fluid collection.  VESSELS: Atherosclerotic changes  RETROPERITONEUM: within normal limits.  ABDOMINAL WALL: within normal limits.  BONES: There is ankylosis of the T11/12 vertebral bodies.    IMPRESSION: Thickened distal esophagus again noted suggesting esophagitis. Direct visualization recommended when clinically feasible.    Mild diffuse bladder wall thickening.      < end of copied text >        ASSESSMENT:  HPI:  60 y.o male from [facility] with a PMHx of ESRD on hemodialysis Tues, Thurs ,Sat, HTN, DM, partially blind, HLD, CAD, osteoporosis and A PSHx of a AV fistula and left BKA presents to the ED from [facility] for weakness. Per EMS, patient was reported to have dialysis today but he felt dizzy and vomited x 3 yesterday. As per ED provider note, patient stated he just had diffuse weakness and denied dizziness, nausea and vomiting. Patient was d/cd on Jan 7th 2020 for intractable vomiting, possibly secondary to gastroparesis/uremia. Patient is a poor historian . Patient is alert and oriented x3. NKDA  Initially pt refused work up and requested to be sent back to the facility and he was planned for DC. When ambulance arrived to transport patient back to [facility], repeat VS showed SBP 60s. Patient was sleepy but continues to verbalize refusal of workup in ED. By ED provider, Haldol was given and work up was done.    In ED,  Pt got 1L NS bolus and BP improved to 93/58. Pt is not co-operative for an exam, but he denied any pain or discomfort.  (13 Jan 2021 03:51)      PLAN: Diarrhea likely 2/2 to uremia vs infectious process  - Stool PCR/ C-Diff PCR  - Monitor for worsening signs of uremia. Possible HD tomorrow     FOLLOW UP / RESULT:

## 2021-01-13 NOTE — ED ADULT NURSE NOTE - OBJECTIVE STATEMENT
Patient brought in by for as per ems report dizziness today after dialysis , and vomiting yesterday. Rt. AV fistula

## 2021-01-13 NOTE — H&P ADULT - HISTORY OF PRESENT ILLNESS
60 y.o male with a PMHx of ESRD , hemodialysis on Tues, Thurs ,Sat, HTN, DM, partially blind, HLD, CAD, osteoporosis and A PSHx of a AV fistula and left BKA presents to the ED from [facility] for weakness. Per EMS, patient was reported to have dialysis today but he felt dizzy and vomited x 3 yesterday. Patient states he just has diffuse weakness and denies dizziness, nausea and vomiting. Patient was d/c on Jan 7th 2020 for intractable vomiting, possibly secondary to gastroparesis/uremia. Patient is a poor historian . Patient is alert and oriented x3. NKDA 60 y.o male from [facility] with a PMHx of ESRD on hemodialysis Tues, Thurs ,Sat, HTN, DM, partially blind, HLD, CAD, osteoporosis and A PSHx of a AV fistula and left BKA presents to the ED from [facility] for weakness. Per EMS, patient was reported to have dialysis today but he felt dizzy and vomited x 3 yesterday. As per ED provider note, patient stated he just had diffuse weakness and denied dizziness, nausea and vomiting. Patient was d/cd on Jan 7th 2020 for intractable vomiting, possibly secondary to gastroparesis/uremia. Patient is a poor historian . Patient is alert and oriented x3. NKDA  Initially pt refused work up and requested to be sent back to the facility and he was planned for DC. When ambulance arrived to transport patient back to [facility], repeat VS showed SBP 60s. Patient was sleepy but continues to verbalize refusal of workup in ED. By ED provider, Haldol was given and work up was done.    In ED,  Pt got 1L NS bolus and BP improved to 93/58. Pt is not co-operative for an exam, but he denied any pain or discomfort.

## 2021-01-13 NOTE — ED ADULT NURSE REASSESSMENT NOTE - NS ED NURSE REASSESS COMMENT FT1
Pt. is refusing all medication, ITZ Aguayo and MD De La Torre aware and notified. Pt. FS at 1130 was 162. Pt. refused insulin.

## 2021-01-13 NOTE — H&P ADULT - NSHPSOCIALHISTORY_GEN_ALL_CORE
Patient is from [facility]. Pt didn't answer to the questions. On last admission, pt denies any smoking, alcohol use or use of illicit drugs.

## 2021-01-13 NOTE — H&P ADULT - ASSESSMENT
60 y.o male from [facility] with a PMHx of ESRD on hemodialysis Tues, Thurs ,Sat, HTN, DM, partially blind, HLD, CAD, osteoporosis and A PSHx of a AV fistula and left BKA presents to the ED from [facility] for weakness. Per EMS, patient was reported to have dialysis today but he felt dizzy and vomited x 3 yesterday. As per ED provider note, patient stated he just had diffuse weakness and denied dizziness, nausea and vomiting.   When ambulance arrived to transport patient back to [facility], repeat VS showed SBP 60s. Pt was admitted for hypotension likely due to dehydration.    In ED,  Pt got 1L NS bolus and BP improved to 93/58. Pt is not co-operative for an exam, but he denied any pain or discomfort.

## 2021-01-13 NOTE — H&P ADULT - NSHPPHYSICALEXAM_GEN_ALL_CORE
Vital Signs Last 24 Hrs  T(C): 36.9 (13 Jan 2021 01:31), Max: 36.9 (12 Jan 2021 21:32)  T(F): 98.5 (13 Jan 2021 01:31), Max: 98.5 (12 Jan 2021 21:32)  HR: 84 (13 Jan 2021 04:36) (84 - 104)  BP: 91/55 (13 Jan 2021 04:36) (82/47 - 121/91)  BP(mean): --  RR: 18 (13 Jan 2021 03:30) (16 - 18)  SpO2: 98% (13 Jan 2021 04:36) (96% - 98%)      PHYSICAL EXAM:  GENERAL: NAD, well-groomed, well-developed, sleeping comfortably  HEAD:  Atraumatic, Normocephalic  EYES: Right eye's pupil is reactive, left eye white, conjunctiva and sclera clear  ENMT: Dry mucous membranes  NECK: Supple, No JVD, Normal thyroid  NERVOUS SYSTEM:  Answer minimally, as per ED doctor, pt was Alert & Oriented X3  CHEST/LUNG: Clear to percussion bilaterally; No rales, rhonchi, wheezing, or rubs  HEART: Regular rate and rhythm; No murmurs, rubs, or gallops  ABDOMEN: Soft, Nontender, Nondistended; Bowel sounds present  EXTREMITIES:  2+ Peripheral Pulses, No clubbing, cyanosis, or edema (+) L BKA  LYMPH: No lymphadenopathy noted  SKIN: No rashes or lesions

## 2021-01-14 ENCOUNTER — TRANSCRIPTION ENCOUNTER (OUTPATIENT)
Age: 61
End: 2021-01-14

## 2021-01-14 VITALS
DIASTOLIC BLOOD PRESSURE: 55 MMHG | TEMPERATURE: 98 F | SYSTOLIC BLOOD PRESSURE: 121 MMHG | RESPIRATION RATE: 16 BRPM | OXYGEN SATURATION: 93 % | HEART RATE: 82 BPM

## 2021-01-14 LAB
GLUCOSE BLDC GLUCOMTR-MCNC: 159 MG/DL — HIGH (ref 70–99)
GLUCOSE BLDC GLUCOMTR-MCNC: 164 MG/DL — HIGH (ref 70–99)

## 2021-01-14 PROCEDURE — 82962 GLUCOSE BLOOD TEST: CPT

## 2021-01-14 PROCEDURE — 71045 X-RAY EXAM CHEST 1 VIEW: CPT

## 2021-01-14 PROCEDURE — 87635 SARS-COV-2 COVID-19 AMP PRB: CPT

## 2021-01-14 PROCEDURE — 87046 STOOL CULTR AEROBIC BACT EA: CPT

## 2021-01-14 PROCEDURE — 80053 COMPREHEN METABOLIC PANEL: CPT

## 2021-01-14 PROCEDURE — 36415 COLL VENOUS BLD VENIPUNCTURE: CPT

## 2021-01-14 PROCEDURE — 86850 RBC ANTIBODY SCREEN: CPT

## 2021-01-14 PROCEDURE — 84484 ASSAY OF TROPONIN QUANT: CPT

## 2021-01-14 PROCEDURE — U0005: CPT

## 2021-01-14 PROCEDURE — 99261: CPT

## 2021-01-14 PROCEDURE — 83690 ASSAY OF LIPASE: CPT

## 2021-01-14 PROCEDURE — 93005 ELECTROCARDIOGRAM TRACING: CPT

## 2021-01-14 PROCEDURE — 83605 ASSAY OF LACTIC ACID: CPT

## 2021-01-14 PROCEDURE — 84100 ASSAY OF PHOSPHORUS: CPT

## 2021-01-14 PROCEDURE — 99285 EMERGENCY DEPT VISIT HI MDM: CPT

## 2021-01-14 PROCEDURE — 87045 FECES CULTURE AEROBIC BACT: CPT

## 2021-01-14 PROCEDURE — 86900 BLOOD TYPING SEROLOGIC ABO: CPT

## 2021-01-14 PROCEDURE — 87040 BLOOD CULTURE FOR BACTERIA: CPT

## 2021-01-14 PROCEDURE — 85025 COMPLETE CBC W/AUTO DIFF WBC: CPT

## 2021-01-14 PROCEDURE — 86901 BLOOD TYPING SEROLOGIC RH(D): CPT

## 2021-01-14 PROCEDURE — 83735 ASSAY OF MAGNESIUM: CPT

## 2021-01-14 RX ORDER — MIDODRINE HYDROCHLORIDE 2.5 MG/1
1 TABLET ORAL
Qty: 42 | Refills: 0
Start: 2021-01-14 | End: 2021-01-27

## 2021-01-14 RX ADMIN — Medication 1 GRAM(S): at 05:14

## 2021-01-14 RX ADMIN — HEPARIN SODIUM 5000 UNIT(S): 5000 INJECTION INTRAVENOUS; SUBCUTANEOUS at 05:15

## 2021-01-14 RX ADMIN — MIDODRINE HYDROCHLORIDE 5 MILLIGRAM(S): 2.5 TABLET ORAL at 05:14

## 2021-01-14 RX ADMIN — PANTOPRAZOLE SODIUM 40 MILLIGRAM(S): 20 TABLET, DELAYED RELEASE ORAL at 05:14

## 2021-01-14 RX ADMIN — Medication 1: at 08:06

## 2021-01-14 RX ADMIN — SEVELAMER CARBONATE 1600 MILLIGRAM(S): 2400 POWDER, FOR SUSPENSION ORAL at 08:52

## 2021-01-14 NOTE — DISCHARGE NOTE PROVIDER - HOSPITAL COURSE
60 y.o male from [facility] with a PMHx of ESRD on hemodialysis Tues, Thurs ,Sat, HTN, DM, partially blind, HLD, CAD, osteoporosis and A PSHx of a AV fistula and left BKA presents to the ED from [facility] for weakness. Per EMS, patient was reported to have dialysis today but he felt dizzy and vomited x 3 yesterday. As per ED provider note, patient stated he just had diffuse weakness and denied dizziness, nausea and vomiting. Patient was d/cd on Jan 7th 2020 for intractable vomiting, possibly secondary to gastroparesis/uremia. Patient is a poor historian . Patient is alert and oriented x3. NKDA  Initially pt refused work up and requested to be sent back to the facility and he was planned for DC. When ambulance arrived to transport patient back to [facility], repeat VS showed SBP 60s. Patient was sleepy but continues to verbalize refusal of workup in ED. By ED provider, Haldol was given and work up was done.    In ED,  Pt got 1L NS bolus and BP improved to 93/58. Pt is not co-operative for an exam, but he denied any pain or discomfort.    60 y.o male from [facility] with a PMHx of ESRD on hemodialysis Tues, Thurs ,Sat, HTN, DM, partially blind, HLD, CAD, osteoporosis and A PSHx of a AV fistula and left BKA presents to the ED from [facility] for weakness. Per EMS, patient was reported to have dialysis today but he felt dizzy and vomited x 3 yesterday. As per ED provider note, patient stated he just had diffuse weakness and denied dizziness, nausea and vomiting. Patient was d/cd on Jan 7th 2020 for intractable vomiting, possibly secondary to gastroparesis/uremia. Patient is a poor historian . Patient is alert and oriented x3. NKDA  Initially pt refused work up and requested to be sent back to the facility and he was planned for DC. When ambulance arrived to transport patient back to [facility], repeat VS showed SBP 60s. Patient was sleepy but continues to verbalize refusal of workup in ED. By ED provider, Haldol was given and work up was done.    In ED,  Pt got 1L NS bolus and BP improved to 93/58. Midodrine initiated. Pt improving, no further diarrhea noted. Pt tolerating po, drinking tea ~500ml. Pt wants to be discharged today, says he will go to dialysis outpatient. Outpatient   dialysis coordinated by Renal Dr. Mosher. SW assisting.     Pt is cleared by attending for discharge back to [facility].

## 2021-01-14 NOTE — DISCHARGE NOTE PROVIDER - NSDCMRMEDTOKEN_GEN_ALL_CORE_FT
folic acid 1 mg oral tablet: 1 tab(s) orally once a day  insulin regular 100 units/mL human recombinant injectable solution: 1 unit(s) injectable 2 times a day sliding scale  lidocaine 0.5% topical cream:   Pamelor 10 mg oral capsule: 1 cap(s) orally once a day  Protonix 40 mg oral delayed release tablet: 1 tab(s) orally 2 times a day  Renvela 800 mg oral tablet: 1 tab(s) orally once a day (at bedtime)  Renvela 800 mg oral tablet: 2 tab(s) orally 3 times a day (with meals)  sucralfate 1 g/10 mL oral suspension: 10 milliliter(s) orally 4 times a day  traMADol 50 mg oral tablet: 1 tab(s) orally every 8 hours, As Needed - for severe pain  Tylenol 500 mg oral tablet: 1 tab(s) orally 2 times a day, As Needed - for mild pain  Vaseline topical ointment: Apply topically to affected area once a day  Zocor 40 mg oral tablet: 1 tab(s) orally once a day (at bedtime)  Zofran 4 mg oral tablet: 1 tab(s) orally every 6 hours, As Needed - for nausea   folic acid 1 mg oral tablet: 1 tab(s) orally once a day  insulin regular 100 units/mL human recombinant injectable solution: 1 unit(s) injectable 2 times a day sliding scale  lidocaine 0.5% topical cream:   midodrine 5 mg oral tablet: 1 tab(s) orally every 8 hours  Pamelor 10 mg oral capsule: 1 cap(s) orally once a day  Protonix 40 mg oral delayed release tablet: 1 tab(s) orally 2 times a day  Renvela 800 mg oral tablet: 1 tab(s) orally once a day (at bedtime)  Renvela 800 mg oral tablet: 2 tab(s) orally 3 times a day (with meals)  sucralfate 1 g/10 mL oral suspension: 10 milliliter(s) orally 4 times a day  traMADol 50 mg oral tablet: 1 tab(s) orally every 8 hours, As Needed - for severe pain  Tylenol 500 mg oral tablet: 1 tab(s) orally 2 times a day, As Needed - for mild pain  Vaseline topical ointment: Apply topically to affected area once a day  Zocor 40 mg oral tablet: 1 tab(s) orally once a day (at bedtime)  Zofran 4 mg oral tablet: 1 tab(s) orally every 6 hours, As Needed - for nausea

## 2021-01-14 NOTE — DISCHARGE NOTE NURSING/CASE MANAGEMENT/SOCIAL WORK - PATIENT PORTAL LINK FT
You can access the FollowMyHealth Patient Portal offered by [facility] by registering at the following website: [email or web address]. By joining [employer]’s FollowMyHealth portal, you will also be able to view your health information using other applications (apps) compatible with our system.

## 2021-01-14 NOTE — DISCHARGE NOTE PROVIDER - NSDCCPCAREPLAN_GEN_ALL_CORE_FT
PRINCIPAL DISCHARGE DIAGNOSIS  Diagnosis: Hypotension  Assessment and Plan of Treatment: likely due to diarrhea.   Hypotension resolved after IV hydration.   Diarrhea resolved.  Continue with medication Midodrine as prescribed.  Maintain hydration, nutrition, rest, and activity as tolerated.  Report to your doctor any changes in your condition, and or worsening symptoms.   Follow-up with your primary care physician. Call for appointment.        SECONDARY DISCHARGE DIAGNOSES  Diagnosis: ESRD on hemodialysis  Assessment and Plan of Treatment: Continue with your dialysis treatments. Follow with your nephrologist (kidney physician). Continue your medications as prescribed.      Diagnosis: Diabetes mellitus  Assessment and Plan of Treatment: Your A1C is 5.9 which indicates you are prediabetic.  Maintain healthy diet.  Avoid simple sugars.  Monitor your A1C every 3 months.  Report to your doctor any changes in your condition, and or worsening symptoms.   Follow-up with your primary care physician. Call for appointment.

## 2021-01-14 NOTE — DISCHARGE NOTE PROVIDER - NSDCFUSCHEDAPPT_GEN_ALL_CORE_FT
KIAN VALERO ; 01/25/2021 ; [facility] [address] KIAN Estrada ; 01/25/2021 ; [facility] [address] KIAN VALERO ; 02/22/2021 ; [facility] [address] ; 02/22/2021 ; [facility] [address]

## 2021-01-14 NOTE — DISCHARGE NOTE PROVIDER - PROVIDER TOKENS
PROVIDER:[TOKEN:[[id number]],FOLLOWUP:[1-3 days]],PROVIDER:[TOKEN:[[id number]],FOLLOWUP:[1-3 days]]

## 2021-01-17 ENCOUNTER — INPATIENT (INPATIENT)
Facility: HOSPITAL | Age: 61
LOS: 2 days | Discharge: NURSING, REHAB OR LONG-TERM CARE | DRG: 368 | End: 2021-01-20
Attending: INTERNAL MEDICINE | Admitting: INTERNAL MEDICINE
Payer: MEDICAID

## 2021-01-17 VITALS
TEMPERATURE: 97 F | DIASTOLIC BLOOD PRESSURE: 95 MMHG | RESPIRATION RATE: 18 BRPM | SYSTOLIC BLOOD PRESSURE: 157 MMHG | HEIGHT: 62 IN | OXYGEN SATURATION: 100 % | HEART RATE: 115 BPM

## 2021-01-17 DIAGNOSIS — K92.2 GASTROINTESTINAL HEMORRHAGE, UNSPECIFIED: ICD-10-CM

## 2021-01-17 DIAGNOSIS — R11.2 NAUSEA WITH VOMITING, UNSPECIFIED: ICD-10-CM

## 2021-01-17 DIAGNOSIS — Z98.42 CATARACT EXTRACTION STATUS, LEFT EYE: Chronic | ICD-10-CM

## 2021-01-17 DIAGNOSIS — Z98.41 CATARACT EXTRACTION STATUS, RIGHT EYE: Chronic | ICD-10-CM

## 2021-01-17 DIAGNOSIS — Z98.890 OTHER SPECIFIED POSTPROCEDURAL STATES: Chronic | ICD-10-CM

## 2021-01-17 DIAGNOSIS — Z89.512 ACQUIRED ABSENCE OF LEFT LEG BELOW KNEE: Chronic | ICD-10-CM

## 2021-01-17 LAB
ALBUMIN SERPL ELPH-MCNC: 3.3 G/DL — LOW (ref 3.5–5)
ALP SERPL-CCNC: 118 U/L — SIGNIFICANT CHANGE UP (ref 40–120)
ALT FLD-CCNC: 13 U/L DA — SIGNIFICANT CHANGE UP (ref 10–60)
ANION GAP SERPL CALC-SCNC: 13 MMOL/L — SIGNIFICANT CHANGE UP (ref 5–17)
APTT BLD: 36 SEC — HIGH (ref 27.5–35.5)
AST SERPL-CCNC: <3 U/L — LOW (ref 10–40)
BASOPHILS # BLD AUTO: 0 K/UL — SIGNIFICANT CHANGE UP (ref 0–0.2)
BASOPHILS NFR BLD AUTO: 0 % — SIGNIFICANT CHANGE UP (ref 0–2)
BILIRUB SERPL-MCNC: 0.4 MG/DL — SIGNIFICANT CHANGE UP (ref 0.2–1.2)
BUN SERPL-MCNC: 43 MG/DL — HIGH (ref 7–18)
CALCIUM SERPL-MCNC: 8 MG/DL — LOW (ref 8.4–10.5)
CHLORIDE SERPL-SCNC: 100 MMOL/L — SIGNIFICANT CHANGE UP (ref 96–108)
CO2 SERPL-SCNC: 24 MMOL/L — SIGNIFICANT CHANGE UP (ref 22–31)
CREAT SERPL-MCNC: 13.5 MG/DL — HIGH (ref 0.5–1.3)
CULTURE RESULTS: SIGNIFICANT CHANGE UP
EOSINOPHIL # BLD AUTO: 0 K/UL — SIGNIFICANT CHANGE UP (ref 0–0.5)
EOSINOPHIL NFR BLD AUTO: 0 % — SIGNIFICANT CHANGE UP (ref 0–6)
GLUCOSE SERPL-MCNC: 174 MG/DL — HIGH (ref 70–99)
HCT VFR BLD CALC: 36 % — LOW (ref 39–50)
HGB BLD-MCNC: 11.3 G/DL — LOW (ref 13–17)
INR BLD: 0.97 RATIO — SIGNIFICANT CHANGE UP (ref 0.88–1.16)
LIDOCAIN IGE QN: 180 U/L — SIGNIFICANT CHANGE UP (ref 73–393)
LYMPHOCYTES # BLD AUTO: 0.23 K/UL — LOW (ref 1–3.3)
LYMPHOCYTES # BLD AUTO: 4 % — LOW (ref 13–44)
MAGNESIUM SERPL-MCNC: 2.5 MG/DL — SIGNIFICANT CHANGE UP (ref 1.6–2.6)
MCHC RBC-ENTMCNC: 28.3 PG — SIGNIFICANT CHANGE UP (ref 27–34)
MCHC RBC-ENTMCNC: 31.4 GM/DL — LOW (ref 32–36)
MCV RBC AUTO: 90.2 FL — SIGNIFICANT CHANGE UP (ref 80–100)
MONOCYTES # BLD AUTO: 0.34 K/UL — SIGNIFICANT CHANGE UP (ref 0–0.9)
MONOCYTES NFR BLD AUTO: 6 % — SIGNIFICANT CHANGE UP (ref 2–14)
NEUTROPHILS # BLD AUTO: 5.1 K/UL — SIGNIFICANT CHANGE UP (ref 1.8–7.4)
NEUTROPHILS NFR BLD AUTO: 90 % — HIGH (ref 43–77)
PLATELET # BLD AUTO: 149 K/UL — LOW (ref 150–400)
POTASSIUM SERPL-MCNC: 3.5 MMOL/L — SIGNIFICANT CHANGE UP (ref 3.5–5.3)
POTASSIUM SERPL-SCNC: 3.5 MMOL/L — SIGNIFICANT CHANGE UP (ref 3.5–5.3)
PROT SERPL-MCNC: 8 G/DL — SIGNIFICANT CHANGE UP (ref 6–8.3)
PROTHROM AB SERPL-ACNC: 11.6 SEC — SIGNIFICANT CHANGE UP (ref 10.6–13.6)
RBC # BLD: 3.99 M/UL — LOW (ref 4.2–5.8)
RBC # FLD: 16.9 % — HIGH (ref 10.3–14.5)
SARS-COV-2 RNA SPEC QL NAA+PROBE: SIGNIFICANT CHANGE UP
SODIUM SERPL-SCNC: 137 MMOL/L — SIGNIFICANT CHANGE UP (ref 135–145)
SPECIMEN SOURCE: SIGNIFICANT CHANGE UP
WBC # BLD: 5.67 K/UL — SIGNIFICANT CHANGE UP (ref 3.8–10.5)
WBC # FLD AUTO: 5.67 K/UL — SIGNIFICANT CHANGE UP (ref 3.8–10.5)

## 2021-01-17 PROCEDURE — 99285 EMERGENCY DEPT VISIT HI MDM: CPT

## 2021-01-17 PROCEDURE — 74176 CT ABD & PELVIS W/O CONTRAST: CPT | Mod: 26

## 2021-01-17 RX ORDER — SEVELAMER CARBONATE 2400 MG/1
800 POWDER, FOR SUSPENSION ORAL
Refills: 0 | Status: DISCONTINUED | OUTPATIENT
Start: 2021-01-17 | End: 2021-01-20

## 2021-01-17 RX ORDER — SIMVASTATIN 20 MG/1
40 TABLET, FILM COATED ORAL AT BEDTIME
Refills: 0 | Status: DISCONTINUED | OUTPATIENT
Start: 2021-01-17 | End: 2021-01-20

## 2021-01-17 RX ORDER — NORTRIPTYLINE HYDROCHLORIDE 10 MG/1
10 CAPSULE ORAL DAILY
Refills: 0 | Status: DISCONTINUED | OUTPATIENT
Start: 2021-01-17 | End: 2021-01-20

## 2021-01-17 RX ORDER — DEXTROSE 50 % IN WATER 50 %
25 SYRINGE (ML) INTRAVENOUS ONCE
Refills: 0 | Status: DISCONTINUED | OUTPATIENT
Start: 2021-01-17 | End: 2021-01-18

## 2021-01-17 RX ORDER — FOLIC ACID 0.8 MG
1 TABLET ORAL DAILY
Refills: 0 | Status: DISCONTINUED | OUTPATIENT
Start: 2021-01-17 | End: 2021-01-20

## 2021-01-17 RX ORDER — ONDANSETRON 8 MG/1
4 TABLET, FILM COATED ORAL EVERY 4 HOURS
Refills: 0 | Status: DISCONTINUED | OUTPATIENT
Start: 2021-01-17 | End: 2021-01-20

## 2021-01-17 RX ORDER — GLUCAGON INJECTION, SOLUTION 0.5 MG/.1ML
1 INJECTION, SOLUTION SUBCUTANEOUS ONCE
Refills: 0 | Status: DISCONTINUED | OUTPATIENT
Start: 2021-01-17 | End: 2021-01-20

## 2021-01-17 RX ORDER — SODIUM CHLORIDE 9 MG/ML
1000 INJECTION, SOLUTION INTRAVENOUS
Refills: 0 | Status: DISCONTINUED | OUTPATIENT
Start: 2021-01-17 | End: 2021-01-20

## 2021-01-17 RX ORDER — SUCRALFATE 1 G
1 TABLET ORAL
Refills: 0 | Status: DISCONTINUED | OUTPATIENT
Start: 2021-01-17 | End: 2021-01-20

## 2021-01-17 RX ORDER — DEXTROSE 50 % IN WATER 50 %
12.5 SYRINGE (ML) INTRAVENOUS ONCE
Refills: 0 | Status: DISCONTINUED | OUTPATIENT
Start: 2021-01-17 | End: 2021-01-18

## 2021-01-17 RX ORDER — DEXTROSE 50 % IN WATER 50 %
15 SYRINGE (ML) INTRAVENOUS ONCE
Refills: 0 | Status: DISCONTINUED | OUTPATIENT
Start: 2021-01-17 | End: 2021-01-18

## 2021-01-17 RX ORDER — INSULIN LISPRO 100/ML
VIAL (ML) SUBCUTANEOUS EVERY 6 HOURS
Refills: 0 | Status: DISCONTINUED | OUTPATIENT
Start: 2021-01-17 | End: 2021-01-18

## 2021-01-17 RX ORDER — ONDANSETRON 8 MG/1
4 TABLET, FILM COATED ORAL ONCE
Refills: 0 | Status: COMPLETED | OUTPATIENT
Start: 2021-01-17 | End: 2021-01-17

## 2021-01-17 RX ORDER — PANTOPRAZOLE SODIUM 20 MG/1
40 TABLET, DELAYED RELEASE ORAL EVERY 24 HOURS
Refills: 0 | Status: DISCONTINUED | OUTPATIENT
Start: 2021-01-17 | End: 2021-01-17

## 2021-01-17 RX ORDER — PANTOPRAZOLE SODIUM 20 MG/1
40 TABLET, DELAYED RELEASE ORAL EVERY 12 HOURS
Refills: 0 | Status: DISCONTINUED | OUTPATIENT
Start: 2021-01-17 | End: 2021-01-17

## 2021-01-17 RX ORDER — SEVELAMER CARBONATE 2400 MG/1
1600 POWDER, FOR SUSPENSION ORAL
Refills: 0 | Status: DISCONTINUED | OUTPATIENT
Start: 2021-01-17 | End: 2021-01-20

## 2021-01-17 RX ADMIN — PANTOPRAZOLE SODIUM 40 MILLIGRAM(S): 20 TABLET, DELAYED RELEASE ORAL at 14:07

## 2021-01-17 NOTE — ED ADULT NURSE NOTE - ED STAT RN HANDOFF DETAILS
Report given to MICHI Montanez. Pt resting in bed, tele box G, Sinus Tachycardia. No acute distress noted, denies chest pain, no shortness of breath indicated.

## 2021-01-17 NOTE — ED ADULT NURSE REASSESSMENT NOTE - NS ED NURSE REASSESS COMMENT FT1
Pt resting in bed, A&OX3, right arm AV Fistula noted, dialysis days, Tues, Thurs, sat. No acute distress noted, denies chest pain, no shortness of breath indicated. Pt at this time refusing blood work and medications. MD Luciana aware. Pt resting in bed, A&OX3, right arm AV Fistula noted, dialysis days, Tues., Thurs., sat. BKA noted to left leg. No acute distress noted, denies chest pain, no shortness of breath indicated. Pt at this time refusing blood work and medications. MD Luciana aware.

## 2021-01-17 NOTE — H&P ADULT - ASSESSMENT
60 y.o male from [facility] with a PMHx of ESRD on hemodialysis Tues, Thurs ,Sat, HTN, DM, partially blind, HLD, CAD, osteoporosis and A PSHx of a AV fistula and left BKA presents to the ED from [facility] for weakness. Per EMS, patient was reported to have dialysis today but he felt dizzy and vomited x 3 yesterday. As per ED provider note, patient stated he just had diffuse weakness and denied dizziness, nausea and vomiting.   When ambulance arrived to transport patient back to [facility], repeat VS showed SBP 60s. Pt was admitted for hypotension likely due to dehydration.    In ED,  Pt got 1L NS bolus and BP improved to 93/58. Pt is not co-operative for an exam, but he denied any pain or discomfort.    60 y.o male from [facility] with a PMHx of ESRD on hemodialysis Tues, Thurs ,Sat, HTN, DM, partially blind, HLD, CAD, osteoporosis and A PSHx of a AV fistula and left BKA presents to the ED from [facility] for coffee ground emesis.

## 2021-01-17 NOTE — ED PROVIDER NOTE - OBJECTIVE STATEMENT
60 year old male with PMHx of ESRD (on dialysis Tues/ Thurs/Sat), diabetes mellitus, hyperlipidemia, CAD, diabetic neuropathy, adrenal insufficiency, anemia, hypertension, osteoarthritis, peripheral vascular disease, lumbosacral spinal stenosis, and vision loss of the left eye and PSHx of left below the knee amputation with two recent admissions to this hospital (first one in December 2020 for intractable vomiting, second one in January 2021 for hypotension) presenting to the ED with complaints of nausea and vomiting since last night, as per patient. Patient is noted to have completed his full dialysis session yesterday. Patient is denying any abdominal pain. Per transfer papers, patient was sent for evaluation of coffee ground emesis. Patient denies any fevers. Patient states that he is still making urine, and denies any urinary complaints. NKDA.

## 2021-01-17 NOTE — H&P ADULT - NSHPSOCIALHISTORY_GEN_ALL_CORE
Pt didn't answer to the questions. On last admission, pt denies any smoking, alcohol use or use of illicit drugs.

## 2021-01-17 NOTE — H&P ADULT - PROBLEM SELECTOR PLAN 4
IMPROVE VTE Individual Risk Assessment  RISK                                                                Points  [  ] Previous VTE                                                  3  [  ] Thrombophilia                                               2  [  ] Lower limb paralysis                                      2        (unable to hold up >15 seconds)    [  ] Current Cancer                                              2         (within 6 months)  [ x ] Immobilization > 24 hrs                                1  [  ] ICU/CCU stay > 24 hours                              1  [  ] Age > 60                                                      1  IMPROVE VTE Score ____1_____  Heparin SC for DVT ppx - Patient takes regular insulin sliding scale at [facility]  - will start Admelog sliding scale  - f/u HgA1c (5.9% on 12/28/20)  - NPO for now

## 2021-01-17 NOTE — PATIENT PROFILE ADULT - VISION (WITH CORRECTIVE LENSES IF THE PATIENT USUALLY WEARS THEM):
Partially impaired: cannot see medication labels or newsprint, but can see obstacles in path, and the surrounding layout; can count fingers at arm's length Patient says that he cannot see/Severely impaired: cannot locate objects without hearing or touching them or patient nonresponsive.

## 2021-01-17 NOTE — H&P ADULT - PROBLEM SELECTOR PLAN 5
IMPROVE VTE Individual Risk Assessment  RISK                                                                Points  [  ] Previous VTE                                                  3  [  ] Thrombophilia                                               2  [  ] Lower limb paralysis                                      2        (unable to hold up >15 seconds)    [  ] Current Cancer                                              2         (within 6 months)  [ x ] Immobilization > 24 hrs                                1  [  ] ICU/CCU stay > 24 hours                              1  [  ] Age > 60                                                      1  IMPROVE VTE Score ____1_____  will Hold OFF given GIB

## 2021-01-17 NOTE — ED ADULT NURSE NOTE - OBJECTIVE STATEMENT
pt presents to ed for coffee ground emesis today. Denies fever, chills, diarrhea, abdominal pain, cp, sob, palpitations, HA, dizziness. Breathing unlabored.

## 2021-01-17 NOTE — H&P ADULT - PROBLEM SELECTOR PLAN 3
- Patient takes regular insulin sliding scale at [facility]  - will start Admelog sliding scale  - f/u HgA1c (5.9% on 12/28/20)  - diabetic diet patient on HD T/T/S  last HD on 1/16 T  continue Renvela   monitor BMP daily   SW consult.   Nephro Dr. Mosher consulted

## 2021-01-17 NOTE — ED PROVIDER NOTE - CONSTITUTIONAL, MLM
Patient is weak appearing, however is responding to all questions. Vomitus noted around the mouth. normal...

## 2021-01-17 NOTE — H&P ADULT - PROBLEM SELECTOR PLAN 1
On ambulance arrival. SBP was 60s.  s/p NS 1L, improved to 93/58  Not making any urine   c/w NS IVF x 12hrs  Monitor BP sent from NH intractable nausea with Coffee ground emesis , s/p 4episode of coffee ground emesis in ED   Last EGD: on 1/4 showed grade D esophagitis of distal 15 cm of esophagus and Duodenitis o bulb.  declined FOBT  Suspected Upper GI Bleed  Hb of 11.3 , stable around baseline   Management:  NPO  IV fluids recusitation if needed  Protonix 40mg BID   CBC q 4hrs   Zofran PRN   GI Consult

## 2021-01-17 NOTE — H&P ADULT - PROBLEM SELECTOR PLAN 2
patient on HD T/T/S  last HD on 1/12 Tue   continue renvela   monitor BMP daily   SW consult.   Nephro Dr. Mosher history of hypotension on prior admission, was discharged on midorine 5mg TID , not reflected on [facility] papers  BP on admission 157/97  will Monitor BP

## 2021-01-17 NOTE — ED PROVIDER NOTE - CLINICAL SUMMARY MEDICAL DECISION MAKING FREE TEXT BOX
Patient with intractable nausea and vomiting with coffee ground emesis. Will check labs, perform guaiac exam, obtain CT of the abdomen, and will reassess for admission.

## 2021-01-17 NOTE — H&P ADULT - NSHPPHYSICALEXAM_GEN_ALL_CORE
Vital Signs Last 24 Hrs  T(C): 36.9 (13 Jan 2021 01:31), Max: 36.9 (12 Jan 2021 21:32)  T(F): 98.5 (13 Jan 2021 01:31), Max: 98.5 (12 Jan 2021 21:32)  HR: 84 (13 Jan 2021 04:36) (84 - 104)  BP: 91/55 (13 Jan 2021 04:36) (82/47 - 121/91)  BP(mean): --  RR: 18 (13 Jan 2021 03:30) (16 - 18)  SpO2: 98% (13 Jan 2021 04:36) (96% - 98%)      PHYSICAL EXAM:  GENERAL: NAD, well-groomed, well-developed, sleeping comfortably  HEAD:  Atraumatic, Normocephalic  EYES: Right eye's pupil is reactive, left eye white, conjunctiva and sclera clear  ENMT: Dry mucous membranes  NECK: Supple, No JVD, Normal thyroid  NERVOUS SYSTEM:  Answer minimally, as per ED doctor, pt was Alert & Oriented X3  CHEST/LUNG: Clear to percussion bilaterally; No rales, rhonchi, wheezing, or rubs  HEART: Regular rate and rhythm; No murmurs, rubs, or gallops  ABDOMEN: Soft, Nontender, Nondistended; Bowel sounds present  EXTREMITIES:  2+ Peripheral Pulses, No clubbing, cyanosis, or edema (+) L BKA  LYMPH: No lymphadenopathy noted  SKIN: No rashes or lesions PHYSICAL EXAM:  GENERAL: in mild distress due to intractable nausea   HEAD:  Atraumatic, Normocephalic  EYES: Right eye's pupil is reactive, left eye white, conjunctiva and sclera clear  ENMT: Dry mucous membranes  NECK: Supple, No JVD, Normal thyroid  NERVOUS SYSTEM:  Answer minimally, as per ED doctor, pt was Alert & Oriented X3  CHEST/LUNG: Clear to percussion bilaterally; No rales, rhonchi, wheezing, or rubs  HEART: Regular rate and rhythm; No murmurs, rubs, or gallops  ABDOMEN: tenderness on Epigastrium   EXTREMITIES:  2+ Peripheral Pulses, No clubbing, cyanosis, or edema (+) L BKA  LYMPH: No lymphadenopathy noted  SKIN: No rashes or lesions

## 2021-01-17 NOTE — H&P ADULT - HISTORY OF PRESENT ILLNESS
60 year old male with PMHx of ESRD (on dialysis Tues/ Thurs/Sat), diabetes mellitus, hyperlipidemia, CAD, diabetic neuropathy, adrenal insufficiency, anemia, hypertension, osteoarthritis, peripheral vascular disease, lumbosacral spinal stenosis, and vision loss of the left eye and PSHx of left below the knee amputation with two recent admissions to this hospital (first one in December 2020 for intractable vomiting, second one in January 2021 for hypotension) presenting to the ED with complaints of nausea and vomiting since last night, as per patient. Patient is noted to have completed his full dialysis session yesterday. Patient is denying any abdominal pain. Per transfer papers, patient was sent for evaluation of coffee ground emesis. Patient denies any fevers. Patient states that he is still making urine, and denies any urinary complaints  60 year old male from [facility] with PMHx of ESRD (on dialysis Tues/ Thurs/Sat), diabetes mellitus, hyperlipidemia, CAD, diabetic neuropathy, adrenal insufficiency, anemia, hypertension, osteoarthritis, peripheral vascular disease, lumbosacral spinal stenosis, and vision loss of the left eye and PSHx of left below the knee amputation with two recent admissions to this hospital (first one in December 2020 for intractable vomiting, second one in January 2021 for hypotension) presenting to the ED with complaints of nausea and vomiting since last night, as per patient. Per [facility] papers pt was sent for having multiple episodes of coffee ground emesis. patient reports having regular brown bowel movement. denies any history of GIb , having abdominal pain, fever or any other acute complaints. Patient states that he is still making urine, and denies any urinary complaints. history is limited to patient's incorporation . patient had 4 episode of coffee ground emesis in ED.  Last EGD in [facility] on 1/4 showed grade D esophagitis of distal 15 cm of esophagus and Duodenitis o bulb. pt was discahrges on po Protonix 40 mg BID and Carafate      ED course :4 episodes of coffee ground emesis in ED , received Protonix 40mg X1, Hb :11.1 , around baseline , pt tachycardic      GOC : FULL code

## 2021-01-18 DIAGNOSIS — R11.2 NAUSEA WITH VOMITING, UNSPECIFIED: ICD-10-CM

## 2021-01-18 LAB
A1C WITH ESTIMATED AVERAGE GLUCOSE RESULT: 5.9 % — HIGH (ref 4–5.6)
ANION GAP SERPL CALC-SCNC: 14 MMOL/L — SIGNIFICANT CHANGE UP (ref 5–17)
BUN SERPL-MCNC: 51 MG/DL — HIGH (ref 7–18)
CALCIUM SERPL-MCNC: 7.8 MG/DL — LOW (ref 8.4–10.5)
CHLORIDE SERPL-SCNC: 98 MMOL/L — SIGNIFICANT CHANGE UP (ref 96–108)
CHOLEST SERPL-MCNC: 125 MG/DL — SIGNIFICANT CHANGE UP
CO2 SERPL-SCNC: 25 MMOL/L — SIGNIFICANT CHANGE UP (ref 22–31)
CREAT SERPL-MCNC: 16.3 MG/DL — HIGH (ref 0.5–1.3)
CULTURE RESULTS: SIGNIFICANT CHANGE UP
CULTURE RESULTS: SIGNIFICANT CHANGE UP
ESTIMATED AVERAGE GLUCOSE: 123 MG/DL — HIGH (ref 68–114)
GLUCOSE BLDC GLUCOMTR-MCNC: 114 MG/DL — HIGH (ref 70–99)
GLUCOSE SERPL-MCNC: 93 MG/DL — SIGNIFICANT CHANGE UP (ref 70–99)
HCT VFR BLD CALC: 31.8 % — LOW (ref 39–50)
HDLC SERPL-MCNC: 47 MG/DL — SIGNIFICANT CHANGE UP
HGB BLD-MCNC: 10 G/DL — LOW (ref 13–17)
LIPID PNL WITH DIRECT LDL SERPL: 52 MG/DL — SIGNIFICANT CHANGE UP
MAGNESIUM SERPL-MCNC: 2.4 MG/DL — SIGNIFICANT CHANGE UP (ref 1.6–2.6)
MCHC RBC-ENTMCNC: 28.7 PG — SIGNIFICANT CHANGE UP (ref 27–34)
MCHC RBC-ENTMCNC: 31.4 GM/DL — LOW (ref 32–36)
MCV RBC AUTO: 91.1 FL — SIGNIFICANT CHANGE UP (ref 80–100)
NON HDL CHOLESTEROL: 78 MG/DL — SIGNIFICANT CHANGE UP
NRBC # BLD: 0 /100 WBCS — SIGNIFICANT CHANGE UP (ref 0–0)
PHOSPHATE SERPL-MCNC: 5.8 MG/DL — HIGH (ref 2.5–4.5)
PLATELET # BLD AUTO: 147 K/UL — LOW (ref 150–400)
POTASSIUM SERPL-MCNC: 3.5 MMOL/L — SIGNIFICANT CHANGE UP (ref 3.5–5.3)
POTASSIUM SERPL-SCNC: 3.5 MMOL/L — SIGNIFICANT CHANGE UP (ref 3.5–5.3)
RBC # BLD: 3.49 M/UL — LOW (ref 4.2–5.8)
RBC # FLD: 16.8 % — HIGH (ref 10.3–14.5)
SARS-COV-2 IGG SERPL QL IA: NEGATIVE — SIGNIFICANT CHANGE UP
SARS-COV-2 IGM SERPL IA-ACNC: 0.12 INDEX — SIGNIFICANT CHANGE UP
SODIUM SERPL-SCNC: 137 MMOL/L — SIGNIFICANT CHANGE UP (ref 135–145)
SPECIMEN SOURCE: SIGNIFICANT CHANGE UP
SPECIMEN SOURCE: SIGNIFICANT CHANGE UP
TRIGL SERPL-MCNC: 129 MG/DL — SIGNIFICANT CHANGE UP
TSH SERPL-MCNC: 0.9 UU/ML — SIGNIFICANT CHANGE UP (ref 0.34–4.82)
WBC # BLD: 2.92 K/UL — LOW (ref 3.8–10.5)
WBC # FLD AUTO: 2.92 K/UL — LOW (ref 3.8–10.5)

## 2021-01-18 RX ORDER — ERYTHROPOIETIN 10000 [IU]/ML
4000 INJECTION, SOLUTION INTRAVENOUS; SUBCUTANEOUS
Refills: 0 | Status: DISCONTINUED | OUTPATIENT
Start: 2021-01-19 | End: 2021-01-20

## 2021-01-18 RX ADMIN — SIMVASTATIN 40 MILLIGRAM(S): 20 TABLET, FILM COATED ORAL at 23:22

## 2021-01-18 RX ADMIN — Medication 1 MILLIGRAM(S): at 11:43

## 2021-01-18 RX ADMIN — Medication 1 GRAM(S): at 18:50

## 2021-01-18 RX ADMIN — Medication 1 GRAM(S): at 23:22

## 2021-01-18 RX ADMIN — Medication 1 GRAM(S): at 11:43

## 2021-01-18 RX ADMIN — Medication 1 GRAM(S): at 05:21

## 2021-01-18 RX ADMIN — NORTRIPTYLINE HYDROCHLORIDE 10 MILLIGRAM(S): 10 CAPSULE ORAL at 11:43

## 2021-01-18 NOTE — CONSULT NOTE ADULT - SUBJECTIVE AND OBJECTIVE BOX
[facility] : Progress Note :: [phone], (office [phone]),   Dr Mosher / Dr Washington / Dr Young / Dr Doll / Dr Varsha TURCIOS / Dr Tello / Dr Garcia / Dr Larry qiu  _____________________________________________________________________________________________  Patient is a 60y Male whom presented to the hospital with coffee ground emesis.  He has ESRD on HD at [facility]. Non-compliant with dialysis.  He has been restarted on a diet  renal consulted for ESRD.    PAST MEDICAL & SURGICAL HISTORY:  ESRD on hemodialysis    Vision loss of left eye    Osteoporosis    Osteoarthritis    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    Diabetic neuropathy    Diabetes mellitus    Hyperparathyroidism    Spinal stenosis of lumbosacral region    Peripheral vascular disease    HLD (hyperlipidemia)    Coronary artery disease    Glaucoma    Anemia    CKD (chronic kidney disease)    Adrenal insufficiency    Hypertension    S/P arteriovenous (AV) fistula creation  2018    History of left cataract extraction    History of right cataract extraction    Below knee amputation status, left      fish (Rash)  liver (Anaphylaxis)  No Known Drug Allergies    Home Medications Reviewed  Hospital Medications:   MEDICATIONS  (STANDING):  dextrose 5%. 1000 milliLiter(s) (50 mL/Hr) IV Continuous <Continuous>  dextrose 5%. 1000 milliLiter(s) (100 mL/Hr) IV Continuous <Continuous>  folic acid 1 milliGRAM(s) Oral daily  glucagon  Injectable 1 milliGRAM(s) IntraMuscular once  nortriptyline 10 milliGRAM(s) Oral daily  sevelamer carbonate 1600 milliGRAM(s) Oral three times a day with meals  sevelamer carbonate 800 milliGRAM(s) Oral <User Schedule>  simvastatin 40 milliGRAM(s) Oral at bedtime  sucralfate suspension 1 Gram(s) Oral four times a day    SOCIAL HISTORY:  Denies ETOh,Smoking,   FAMILY HISTORY:        VITALS:  T(F): 98.8 (01-18-21 @ 16:07), Max: 98.8 (01-18-21 @ 16:07)  HR: 95 (01-18-21 @ 16:07)  BP: 99/58 (01-18-21 @ 16:07)  RR: 20 (01-18-21 @ 16:07)  SpO2: 95% (01-18-21 @ 16:07)  Wt(kg): --      PHYSICAL EXAM:  Constitutional: NAD  HEENT: anicteric sclera, oropharynx clear.  Neck: No JVD  Respiratory: CTAB, no wheezes, rales or rhonchi  Cardiovascular: S1, S2, RRR  Gastrointestinal: BS+, soft, NT/ND  Extremities: No peripheral edema  Neurological: A/O x 3, no focal deficits  : No CVA tenderness. No [place].   Skin: No rashes  Vascular Access: AVF with thrill  and bruit    LABS:  01-18    137  |  98  |  51<H>  ----------------------------<  93  3.5   |  25  |  16.30<H>    Ca    7.8<L>      18 Jan 2021 12:25  Phos  5.8     01-18  Mg     2.4     01-18    TPro  8.0  /  Alb  3.3<L>  /  TBili  0.4  /  DBili      /  AST  <3<L>  /  ALT  13  /  AlkPhos  118  01-17    Creatinine Trend: 16.30 <--, 13.50 <--, 14.70 <--                        10.0   2.92  )-----------( 147      ( 18 Jan 2021 12:25 )             31.8     Urine Studies:      RADIOLOGY & ADDITIONAL STUDIES:                
Patient is a 60y old  Male who presents with a chief complaint of upper GIB (18 Jan 2021 09:44)     .     HPI:  HPI:   60 year old male from [facility] with PMHx of ESRD (on dialysis Tues/ Thurs/Sat), diabetes mellitus, hyperlipidemia, CAD, diabetic neuropathy, adrenal insufficiency, anemia, hypertension, osteoarthritis, peripheral vascular disease, lumbosacral spinal stenosis, and vision loss of the left eye and PSHx of left below the knee amputation with two recent admissions to this hospital (first one in December 2020 for intractable vomiting, second one in January 2021 for hypotension) presenting to the ED with complaints of nausea and vomiting since last night, as per patient. Per [facility] papers pt was sent for having multiple episodes of coffee ground emesis. patient reports having regular brown bowel movement. denies any history of GIb , having abdominal pain, fever or any other acute complaints. Patient states that he is still making urine, and denies any urinary complaints. history is limited to patient's incorporation . patient had 4 episode of coffee ground emesis in ED.  Last EGD in [facility] on 1/4 showed grade D esophagitis of distal 15 cm of esophagus and Duodenitis o bulb. pt was discahrges on po Protonix 40 mg BID and Carafate      ED course :4 episodes of coffee ground emesis in ED , received Protonix 40mg X1, Hb :11.1 , around baseline , pt tachycardic      GOC : FULL code      (17 Jan 2021 13:34)            REVIEW OF SYSTEMS  Constitutional:   No fever, no fatigue, no pallor, no night sweats, no weight loss.  HEENT:   No eye pain, no vision changes, no icterus, no mouth ulcers.  Respiratory:   No shortness of breath, no cough, no respiratory distress.   Cardiovascular:   No chest pain, no palpitations.   Gastrointestinal: No abdominal pain, no nausea, no vomiting , no diahrrea, no constipation, no hematochezia,no melena.  Skin:   No rashes, no jaundice, no eczema.   Musculoskeletal:   No joint pain, no swelling, no myalgia.   Neurologic:   No headache, no seizure, no weakness.   Genitourinary:   No dysuria, no decreased urine output.  Psychiatric:  No depression, no anxiety,   Endocrine:   No thyroid disease, no diabetes.  Heme/Lymphatic:   No anemia, no blood transfusions, no lymph node enlargement, no bleeding, no bruising.  ___________________________________________________________________________________________  Allergies    fish (Rash)  liver (Anaphylaxis)  No Known Drug Allergies    Intolerances      MEDICATIONS  (STANDING):  dextrose 5%. 1000 milliLiter(s) (50 mL/Hr) IV Continuous <Continuous>  dextrose 5%. 1000 milliLiter(s) (100 mL/Hr) IV Continuous <Continuous>  folic acid 1 milliGRAM(s) Oral daily  glucagon  Injectable 1 milliGRAM(s) IntraMuscular once  nortriptyline 10 milliGRAM(s) Oral daily  sevelamer carbonate 1600 milliGRAM(s) Oral three times a day with meals  sevelamer carbonate 800 milliGRAM(s) Oral <User Schedule>  simvastatin 40 milliGRAM(s) Oral at bedtime  sucralfate suspension 1 Gram(s) Oral four times a day    MEDICATIONS  (PRN):  ondansetron Injectable 4 milliGRAM(s) IV Push every 4 hours PRN Nausea and/or Vomiting      PAST MEDICAL & SURGICAL HISTORY:  ESRD on hemodialysis    Vision loss of left eye    Osteoporosis    Osteoarthritis    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    Diabetic neuropathy    Diabetes mellitus    Hyperparathyroidism    Spinal stenosis of lumbosacral region    Peripheral vascular disease    HLD (hyperlipidemia)    Coronary artery disease    Glaucoma    Anemia    CKD (chronic kidney disease)    Adrenal insufficiency    Hypertension    S/P arteriovenous (AV) fistula creation  2018    History of left cataract extraction    History of right cataract extraction    Below knee amputation status, left      FAMILY HISTORY:    Social History: No hsitory of : Tobacco use, IVDA, EToH  ______________________________________________________________________________________    PHYSICAL EXAM    Daily     Daily   BMI: 21.9 (01-17 @ 10:40)  Change in Weight:  Vital Signs Last 24 Hrs  T(C): 36.7 (18 Jan 2021 15:15), Max: 37 (18 Jan 2021 00:03)  T(F): 98 (18 Jan 2021 15:15), Max: 98.6 (18 Jan 2021 00:03)  HR: 82 (18 Jan 2021 15:15) (82 - 110)  BP: 96/61 (18 Jan 2021 15:15) (91/50 - 120/70)  BP(mean): --  RR: 18 (18 Jan 2021 15:15) (18 - 18)  SpO2: 98% (18 Jan 2021 15:15) (95% - 100%)    General:  Well developed, well nourished, alert and active, no pallor, NAD.  HEENT:    Normal appearance of conjunctiva, ears, nose, lips, oropharynx, and oral mucosa, anicteric.  Neck:  No masses, no asymmetry.  Lymph Nodes:  No lymphadenopathy.   Cardiovascular:  RRR normal S1/S2, no murmur.  Respiratory:  CTA B/L, normal respiratory effort.   Abdominal:   soft, no masses or tenderness, normoactive BS, NT/ND, no HSM.  Extremities:   No clubbing or cyanosis, normal capillary refill, no edema.   Skin:   No rash, jaundice, lesions, eczema.   Musculoskeletal:  No joint swelling, erythema or tenderness.   Neuro: No focal deficits.   Other:   _______________________________________________________________________________________________  Lab Results:                          10.0   2.92  )-----------( 147      ( 18 Jan 2021 12:25 )             31.8     01-18    137  |  98  |  51<H>  ----------------------------<  93  3.5   |  25  |  16.30<H>    Ca    7.8<L>      18 Jan 2021 12:25  Phos  5.8     01-18  Mg     2.4     01-18    TPro  8.0  /  Alb  3.3<L>  /  TBili  0.4  /  DBili  x   /  AST  <3<L>  /  ALT  13  /  AlkPhos  118  01-17    LIVER FUNCTIONS - ( 17 Jan 2021 11:52 )  Alb: 3.3 g/dL / Pro: 8.0 g/dL / ALK PHOS: 118 U/L / ALT: 13 U/L DA / AST: <3 U/L / GGT: x           PT/INR - ( 17 Jan 2021 11:52 )   PT: 11.6 sec;   INR: 0.97 ratio         PTT - ( 17 Jan 2021 11:52 )  PTT:36.0 sec  Triglycerides, Serum: 129 mg/dL (01-18 @ 12:25)        Stool Results:          RADIOLOGY RESULTS:    SURGICAL PATHOLOGY:

## 2021-01-18 NOTE — PROGRESS NOTE ADULT - SUBJECTIVE AND OBJECTIVE BOX
Patient is a 60y old  Male who presents with a chief complaint of upper GIB (18 Jan 2021 19:23)    PATIENT IS SEEN AND EXAMINED IN MEDICAL FLOOR.    ALLERGIES:  fish (Rash)  liver (Anaphylaxis)  No Known Drug Allergies    VITALS:    Vital Signs Last 24 Hrs  T(C): 36.6 (18 Jan 2021 20:19), Max: 37.1 (18 Jan 2021 16:07)  T(F): 97.8 (18 Jan 2021 20:19), Max: 98.8 (18 Jan 2021 16:07)  HR: 88 (18 Jan 2021 20:19) (82 - 110)  BP: 91/54 (18 Jan 2021 20:19) (91/50 - 120/70)  BP(mean): --  RR: 20 (18 Jan 2021 20:19) (18 - 20)  SpO2: 93% (18 Jan 2021 20:19) (93% - 100%)    LABS:    CBC Full  -  ( 18 Jan 2021 12:25 )  WBC Count : 2.92 K/uL  RBC Count : 3.49 M/uL  Hemoglobin : 10.0 g/dL  Hematocrit : 31.8 %  Platelet Count - Automated : 147 K/uL  Mean Cell Volume : 91.1 fl  Mean Cell Hemoglobin : 28.7 pg  Mean Cell Hemoglobin Concentration : 31.4 gm/dL  Auto Neutrophil # : x  Auto Lymphocyte # : x  Auto Monocyte # : x  Auto Eosinophil # : x  Auto Basophil # : x  Auto Neutrophil % : x  Auto Lymphocyte % : x  Auto Monocyte % : x  Auto Eosinophil % : x  Auto Basophil % : x    PT/INR - ( 17 Jan 2021 11:52 )   PT: 11.6 sec;   INR: 0.97 ratio         PTT - ( 17 Jan 2021 11:52 )  PTT:36.0 sec  01-18    137  |  98  |  51<H>  ----------------------------<  93  3.5   |  25  |  16.30<H>    Ca    7.8<L>      18 Jan 2021 12:25  Phos  5.8     01-18  Mg     2.4     01-18    TPro  8.0  /  Alb  3.3<L>  /  TBili  0.4  /  DBili  x   /  AST  <3<L>  /  ALT  13  /  AlkPhos  118  01-17    CAPILLARY BLOOD GLUCOSE      POCT Blood Glucose.: 114 mg/dL (18 Jan 2021 07:51)        LIVER FUNCTIONS - ( 17 Jan 2021 11:52 )  Alb: 3.3 g/dL / Pro: 8.0 g/dL / ALK PHOS: 118 U/L / ALT: 13 U/L DA / AST: <3 U/L / GGT: x           Creatinine Trend: 16.30<--, 13.50<--, 14.70<--, 13.50<--, 17.70<--, 18.90<--  I&O's Summary          .Stool Feces  01-14 @ 02:28   No enteric pathogens isolated.  (Stool culture examined for Salmonella,  Shigella, Campylobacter, Aeromonas, Plesiomonas,  Vibrio, E.coli O157 and Yersinia)  --  --      .Blood Blood-Peripheral  01-13 @ 07:02   No Growth Final  --  --      .Blood Blood  12-29 @ 02:59   No Growth Final  --  --          MEDICATIONS:    MEDICATIONS  (STANDING):  dextrose 5%. 1000 milliLiter(s) (50 mL/Hr) IV Continuous <Continuous>  dextrose 5%. 1000 milliLiter(s) (100 mL/Hr) IV Continuous <Continuous>  epoetin beverley-epbx (RETACRIT) Injectable 4000 Unit(s) IV Push <User Schedule>  folic acid 1 milliGRAM(s) Oral daily  glucagon  Injectable 1 milliGRAM(s) IntraMuscular once  nortriptyline 10 milliGRAM(s) Oral daily  sevelamer carbonate 1600 milliGRAM(s) Oral three times a day with meals  sevelamer carbonate 800 milliGRAM(s) Oral <User Schedule>  simvastatin 40 milliGRAM(s) Oral at bedtime  sucralfate suspension 1 Gram(s) Oral four times a day      MEDICATIONS  (PRN):  ondansetron Injectable 4 milliGRAM(s) IV Push every 4 hours PRN Nausea and/or Vomiting      REVIEW OF SYSTEMS:                           ALL ROS DONE [ X   ]    CONSTITUTIONAL:  LETHARGIC [   ], FEVER [   ], UNRESPONSIVE [   ]  CVS:  CP  [   ], SOB, [   ], PALPITATIONS [   ], DIZZYNESS [   ]  RS: COUGH [   ], SPUTUM [   ]  GI: ABDOMINAL PAIN [   ], NAUSEA [   ], VOMITINGS [   ], DIARRHEA [   ], CONSTIPATION [   ]  :  DYSURIA [   ], NOCTURIA [   ], INCREASED FREQUENCY [   ], DRIBLING [   ],  SKELETAL: PAINFUL JOINTS [   ], SWOLLEN JOINTS [   ], NECK ACHE [   ], LOW BACK ACHE [   ],  SKIN : ULCERS [   ], RASH [   ], ITCHING [   ]  CNS: HEAD ACHE [   ], DOUBLE VISION [   ], BLURRED VISION [   ], AMS / CONFUSION [   ], SEIZURES [   ], WEAKNESS [   ],TINGLING / NUMBNESS [   ]    PHYSICAL EXAMINATION:  GENERAL APPEARANCE: NO DISTRESS  HEENT:  NO PALLOR, NO  JVD,  NO   NODES, NECK SUPPLE  CVS: S1 +, S2 +, AVF PALPABLE THRILL  RS: AEEB,  OCCASIONAL  RALES +,   NO RONCHI  ABD: SOFT, NT, NO, BS +  EXT: NO PE  SKIN: WARM,   SKELETAL:  left bka  CNS:  AAO X  3 ,   DEFICITS    RADIOLOGY :    EXAM:  CT ABDOMEN AND PELVIS                            PROCEDURE DATE:  01/17/2021          INTERPRETATION:  CLINICAL INFORMATION: History of esophagitis. Presents with abdominal pain, nausea and vomiting.    COMPARISON: CT abdomen pelvis dated 12/27/2020.    PROCEDURE:  CT of the Abdomen and Pelvis was performed without intravenous contrast.  Intravenous contrast: None.  Oral contrast: None.  Sagittal and coronal reformats were performed.    FINDINGS:  LOWER CHEST: Persistent thickening of distal esophagus consistent with known esophagitis.    LIVER: Within normal limits.  BILE DUCTS: Normal caliber.  GALLBLADDER: Within normal limits.  SPLEEN: Within normal limits.  PANCREAS: Within normal limits.  ADRENALS: Bilateral adrenal thickening unchanged.  KIDNEYS/URETERS: Within normal limits.    BLADDER: Bladder wall thickening.  REPRODUCTIVE ORGANS: Prostate is mildly enlarged.    BOWEL: No bowel obstruction. Appendix normal.  PERITONEUM: No ascites.  VESSELS: Atheromatous calcifications.  RETROPERITONEUM/LYMPH NODES: No lymphadenopathy.  ABDOMINAL WALL: Within normal limits.  BONES: T11-T12 fusion.    IMPRESSION:  Esophagitis.    Bladder wall thickening suggestive of cystitis.      ASSESSMENT :     Nausea and vomiting    ESRD on hemodialysis    Vision loss of left eye    Osteoporosis    Osteoarthritis    Contracture of hand    Diabetic neuropathy    Diabetes mellitus    Hyperparathyroidism    Spinal stenosis of lumbosacral region    Peripheral vascular disease    HLD (hyperlipidemia)    Coronary artery disease    Glaucoma    Anemia    CKD (chronic kidney disease)    Adrenal insufficiency    Hypertension    No pertinent past medical history    S/P arteriovenous (AV) fistula creation    History of left cataract extraction    History of right cataract extraction    Below knee amputation status, left    No significant past surgical history        PLAN:  HPI:   60 year old male from [facility] with PMHx of ESRD (on dialysis Tues/ Thurs/Sat), diabetes mellitus, hyperlipidemia, CAD, diabetic neuropathy, adrenal insufficiency, anemia, hypertension, osteoarthritis, peripheral vascular disease, lumbosacral spinal stenosis, and vision loss of the left eye and PSHx of left below the knee amputation with two recent admissions to this hospital (first one in December 2020 for intractable vomiting, second one in January 2021 for hypotension) presenting to the ED with complaints of nausea and vomiting since last night, as per patient. Per [facility] pt was sent for having multiple episodes of coffee ground emesis. patient reports having regular brown bowel movement. denies any history of GIb , having abdominal pain, fever or any other acute complaints. Patient states that he is still making urine, and denies any urinary complaints. history is limited to patient's incorporation . patient had 4 episode of coffee ground emesis in ED.  Last EGD in [facility] on 1/4 showed grade D esophagitis of distal 15 cm of esophagus and Duodenitis o bulb. pt was discahrges on po Protonix 40 mg BID and Carafate      ED course :4 episodes of coffee ground emesis in ED , received Protonix 40mg X1, Hb :11.1 , around baseline , pt tachycardic      GOC : FULL code      (17 Jan 2021 13:34)    # COFFEE GROUND EMESIS W/ RECENT DIAGNOSIS OF ESOPHAGITIS AND DUODENITIS - PLACED ON PPI BID, CARAFATE, PRN ANTIEMETICS, GASTROENTEROLOGY CONSULT  # HLD  # ESRD ON HD TTS - W/ HX OF NONCOMPLIANCE - NEPHROLOGY CONSULT IN PROGRESS  # HTN  # DM   # PARTIALLY BLIND  # S/P LEFT BKA  # HX OF PVD  # LS SPINAL STENOSIS  # GI AND DVT PPX    SHAKIR MAC MD COVERING KUSH MAC MD.        Patient is a 60y old  Male who presents with a chief complaint of upper GIB (18 Jan 2021 19:23)    PATIENT IS SEEN AND EXAMINED IN MEDICAL FLOOR.    ALLERGIES:  fish (Rash)  liver (Anaphylaxis)  No Known Drug Allergies    VITALS:    Vital Signs Last 24 Hrs  T(C): 36.6 (18 Jan 2021 20:19), Max: 37.1 (18 Jan 2021 16:07)  T(F): 97.8 (18 Jan 2021 20:19), Max: 98.8 (18 Jan 2021 16:07)  HR: 88 (18 Jan 2021 20:19) (82 - 110)  BP: 91/54 (18 Jan 2021 20:19) (91/50 - 120/70)  BP(mean): --  RR: 20 (18 Jan 2021 20:19) (18 - 20)  SpO2: 93% (18 Jan 2021 20:19) (93% - 100%)    LABS:    CBC Full  -  ( 18 Jan 2021 12:25 )  WBC Count : 2.92 K/uL  RBC Count : 3.49 M/uL  Hemoglobin : 10.0 g/dL  Hematocrit : 31.8 %  Platelet Count - Automated : 147 K/uL  Mean Cell Volume : 91.1 fl  Mean Cell Hemoglobin : 28.7 pg  Mean Cell Hemoglobin Concentration : 31.4 gm/dL  Auto Neutrophil # : x  Auto Lymphocyte # : x  Auto Monocyte # : x  Auto Eosinophil # : x  Auto Basophil # : x  Auto Neutrophil % : x  Auto Lymphocyte % : x  Auto Monocyte % : x  Auto Eosinophil % : x  Auto Basophil % : x    PT/INR - ( 17 Jan 2021 11:52 )   PT: 11.6 sec;   INR: 0.97 ratio         PTT - ( 17 Jan 2021 11:52 )  PTT:36.0 sec  01-18    137  |  98  |  51<H>  ----------------------------<  93  3.5   |  25  |  16.30<H>    Ca    7.8<L>      18 Jan 2021 12:25  Phos  5.8     01-18  Mg     2.4     01-18    TPro  8.0  /  Alb  3.3<L>  /  TBili  0.4  /  DBili  x   /  AST  <3<L>  /  ALT  13  /  AlkPhos  118  01-17    CAPILLARY BLOOD GLUCOSE      POCT Blood Glucose.: 114 mg/dL (18 Jan 2021 07:51)        LIVER FUNCTIONS - ( 17 Jan 2021 11:52 )  Alb: 3.3 g/dL / Pro: 8.0 g/dL / ALK PHOS: 118 U/L / ALT: 13 U/L DA / AST: <3 U/L / GGT: x           Creatinine Trend: 16.30<--, 13.50<--, 14.70<--, 13.50<--, 17.70<--, 18.90<--  I&O's Summary          .Stool Feces  01-14 @ 02:28   No enteric pathogens isolated.  (Stool culture examined for Salmonella,  Shigella, Campylobacter, Aeromonas, Plesiomonas,  Vibrio, E.coli O157 and Yersinia)  --  --      .Blood Blood-Peripheral  01-13 @ 07:02   No Growth Final  --  --      .Blood Blood  12-29 @ 02:59   No Growth Final  --  --          MEDICATIONS:    MEDICATIONS  (STANDING):  dextrose 5%. 1000 milliLiter(s) (50 mL/Hr) IV Continuous <Continuous>  dextrose 5%. 1000 milliLiter(s) (100 mL/Hr) IV Continuous <Continuous>  epoetin beverley-epbx (RETACRIT) Injectable 4000 Unit(s) IV Push <User Schedule>  folic acid 1 milliGRAM(s) Oral daily  glucagon  Injectable 1 milliGRAM(s) IntraMuscular once  nortriptyline 10 milliGRAM(s) Oral daily  sevelamer carbonate 1600 milliGRAM(s) Oral three times a day with meals  sevelamer carbonate 800 milliGRAM(s) Oral <User Schedule>  simvastatin 40 milliGRAM(s) Oral at bedtime  sucralfate suspension 1 Gram(s) Oral four times a day      MEDICATIONS  (PRN):  ondansetron Injectable 4 milliGRAM(s) IV Push every 4 hours PRN Nausea and/or Vomiting      REVIEW OF SYSTEMS:                           ALL ROS DONE [ X   ]    CONSTITUTIONAL:  LETHARGIC [   ], FEVER [   ], UNRESPONSIVE [   ]  CVS:  CP  [   ], SOB, [   ], PALPITATIONS [   ], DIZZYNESS [   ]  RS: COUGH [   ], SPUTUM [   ]  GI: ABDOMINAL PAIN [   ], NAUSEA [   ], VOMITINGS [   ], DIARRHEA [   ], CONSTIPATION [   ]  :  DYSURIA [   ], NOCTURIA [   ], INCREASED FREQUENCY [   ], DRIBLING [   ],  SKELETAL: PAINFUL JOINTS [   ], SWOLLEN JOINTS [   ], NECK ACHE [   ], LOW BACK ACHE [   ],  SKIN : ULCERS [   ], RASH [   ], ITCHING [   ]  CNS: HEAD ACHE [   ], DOUBLE VISION [   ], BLURRED VISION [   ], AMS / CONFUSION [   ], SEIZURES [   ], WEAKNESS [   ],TINGLING / NUMBNESS [   ]    PHYSICAL EXAMINATION:  GENERAL APPEARANCE: NO DISTRESS  HEENT:  NO PALLOR, NO  JVD,  NO   NODES, NECK SUPPLE  CVS: S1 +, S2 +, AVF PALPABLE THRILL  RS: AEEB,  OCCASIONAL  RALES +,   NO RONCHI  ABD: SOFT, NT, NO, BS +  EXT: NO PE  SKIN: WARM,   SKELETAL:  left bka  CNS:  AAO X  3 ,   DEFICITS    RADIOLOGY :    EXAM:  CT ABDOMEN AND PELVIS                            PROCEDURE DATE:  01/17/2021          INTERPRETATION:  CLINICAL INFORMATION: History of esophagitis. Presents with abdominal pain, nausea and vomiting.    COMPARISON: CT abdomen pelvis dated 12/27/2020.    PROCEDURE:  CT of the Abdomen and Pelvis was performed without intravenous contrast.  Intravenous contrast: None.  Oral contrast: None.  Sagittal and coronal reformats were performed.    FINDINGS:  LOWER CHEST: Persistent thickening of distal esophagus consistent with known esophagitis.    LIVER: Within normal limits.  BILE DUCTS: Normal caliber.  GALLBLADDER: Within normal limits.  SPLEEN: Within normal limits.  PANCREAS: Within normal limits.  ADRENALS: Bilateral adrenal thickening unchanged.  KIDNEYS/URETERS: Within normal limits.    BLADDER: Bladder wall thickening.  REPRODUCTIVE ORGANS: Prostate is mildly enlarged.    BOWEL: No bowel obstruction. Appendix normal.  PERITONEUM: No ascites.  VESSELS: Atheromatous calcifications.  RETROPERITONEUM/LYMPH NODES: No lymphadenopathy.  ABDOMINAL WALL: Within normal limits.  BONES: T11-T12 fusion.    IMPRESSION:  Esophagitis.    Bladder wall thickening suggestive of cystitis.      ASSESSMENT :     Nausea and vomiting    ESRD on hemodialysis    Vision loss of left eye    Osteoporosis    Osteoarthritis    Contracture of hand    Diabetic neuropathy    Diabetes mellitus    Hyperparathyroidism    Spinal stenosis of lumbosacral region    Peripheral vascular disease    HLD (hyperlipidemia)    Coronary artery disease    Glaucoma    Anemia    CKD (chronic kidney disease)    Adrenal insufficiency    Hypertension    No pertinent past medical history    S/P arteriovenous (AV) fistula creation    History of left cataract extraction    History of right cataract extraction    Below knee amputation status, left    No significant past surgical history        PLAN:  HPI:   60 year old male from [facility] with PMHx of ESRD (on dialysis Tues/ Thurs/Sat), diabetes mellitus, hyperlipidemia, CAD, diabetic neuropathy, adrenal insufficiency, anemia, hypertension, osteoarthritis, peripheral vascular disease, lumbosacral spinal stenosis, and vision loss of the left eye and PSHx of left below the knee amputation with two recent admissions to this hospital (first one in December 2020 for intractable vomiting, second one in January 2021 for hypotension) presenting to the ED with complaints of nausea and vomiting since last night, as per patient. Per [facility] pt was sent for having multiple episodes of coffee ground emesis. patient reports having regular brown bowel movement. denies any history of GIb , having abdominal pain, fever or any other acute complaints. Patient states that he is still making urine, and denies any urinary complaints. history is limited to patient's incorporation . patient had 4 episode of coffee ground emesis in ED.  Last EGD in [facility] on 1/4 showed grade D esophagitis of distal 15 cm of esophagus and Duodenitis o bulb. pt was discahrges on po Protonix 40 mg BID and Carafate      ED course :4 episodes of coffee ground emesis in ED , received Protonix 40mg X1, Hb :11.1 , around baseline , pt tachycardic      GOC : FULL code      (17 Jan 2021 13:34)    # COFFEE GROUND EMESIS W/ RECENT DIAGNOSIS OF ESOPHAGITIS AND DUODENITIS - MONITORING HGB, PLACED ON PPI BID, CARAFATE, PRN ANTIEMETICS, GASTROENTEROLOGY CONSULT  # HLD  # ESRD ON HD TTS - W/ HX OF NONCOMPLIANCE - NEPHROLOGY CONSULT IN PROGRESS  # HTN  # DM   # PARTIALLY BLIND  # S/P LEFT BKA  # HX OF PVD  # LS SPINAL STENOSIS  # GI AND DVT PPX    SHAKIR MAC MD COVERING KUSH MAC MD.

## 2021-01-18 NOTE — CONSULT NOTE ADULT - ATTENDING COMMENTS
I was physically present for the key portions of the evaluation and management (E/M) service provided.  The patient was personally seen and examined at bedside.  I have edited the note as appropriate.   Thank you for your consultation and allowing  me to participate in the care of your patients. If you have further questions please contact me at [phone].     Bobby Mayen M.D.       _________________________________________________________________________________________________  [facility]  [address] [place] [place] [address]  Office: [phone]    Harley Tang PA-C  ___________________________________________________________________________________________________

## 2021-01-18 NOTE — CONSULT NOTE ADULT - PROBLEM SELECTOR RECOMMENDATION 9
Likely 2/2 to severe esophagitis   Hemoglobin appears very stable   PPI BID   Carafate TID   Ok to restart diet

## 2021-01-18 NOTE — CONSULT NOTE ADULT - ASSESSMENT
#ESRD.  HD in AM   #  HTN, blood pressure relatively low. Pre-HD midodrine  # renal osteodystrophy: cont sevelamer  # anemia of CKD, Also with GIB. add epogen with HD   # nausea and vomitting. per Gastroloenterology.    
60 year old male with coffee ground emesis

## 2021-01-19 DIAGNOSIS — Z71.89 OTHER SPECIFIED COUNSELING: ICD-10-CM

## 2021-01-19 DIAGNOSIS — K20.90 ESOPHAGITIS, UNSPECIFIED WITHOUT BLEEDING: ICD-10-CM

## 2021-01-19 DIAGNOSIS — Z02.9 ENCOUNTER FOR ADMINISTRATIVE EXAMINATIONS, UNSPECIFIED: ICD-10-CM

## 2021-01-19 DIAGNOSIS — N30.90 CYSTITIS, UNSPECIFIED WITHOUT HEMATURIA: ICD-10-CM

## 2021-01-19 LAB
ANION GAP SERPL CALC-SCNC: 16 MMOL/L — SIGNIFICANT CHANGE UP (ref 5–17)
BUN SERPL-MCNC: 65 MG/DL — HIGH (ref 7–18)
CALCIUM SERPL-MCNC: 7.3 MG/DL — LOW (ref 8.4–10.5)
CHLORIDE SERPL-SCNC: 92 MMOL/L — LOW (ref 96–108)
CO2 SERPL-SCNC: 22 MMOL/L — SIGNIFICANT CHANGE UP (ref 22–31)
CREAT SERPL-MCNC: 17.5 MG/DL — HIGH (ref 0.5–1.3)
GLUCOSE BLDC GLUCOMTR-MCNC: 117 MG/DL — HIGH (ref 70–99)
GLUCOSE BLDC GLUCOMTR-MCNC: 126 MG/DL — HIGH (ref 70–99)
GLUCOSE BLDC GLUCOMTR-MCNC: 145 MG/DL — HIGH (ref 70–99)
GLUCOSE BLDC GLUCOMTR-MCNC: 222 MG/DL — HIGH (ref 70–99)
GLUCOSE BLDC GLUCOMTR-MCNC: 255 MG/DL — HIGH (ref 70–99)
GLUCOSE SERPL-MCNC: 232 MG/DL — HIGH (ref 70–99)
HCT VFR BLD CALC: 27.8 % — LOW (ref 39–50)
HGB BLD-MCNC: 9 G/DL — LOW (ref 13–17)
MCHC RBC-ENTMCNC: 28.6 PG — SIGNIFICANT CHANGE UP (ref 27–34)
MCHC RBC-ENTMCNC: 32.4 GM/DL — SIGNIFICANT CHANGE UP (ref 32–36)
MCV RBC AUTO: 88.3 FL — SIGNIFICANT CHANGE UP (ref 80–100)
NRBC # BLD: 0 /100 WBCS — SIGNIFICANT CHANGE UP (ref 0–0)
PLATELET # BLD AUTO: 133 K/UL — LOW (ref 150–400)
POTASSIUM SERPL-MCNC: 3.4 MMOL/L — LOW (ref 3.5–5.3)
POTASSIUM SERPL-SCNC: 3.4 MMOL/L — LOW (ref 3.5–5.3)
RBC # BLD: 3.15 M/UL — LOW (ref 4.2–5.8)
RBC # FLD: 16.4 % — HIGH (ref 10.3–14.5)
SODIUM SERPL-SCNC: 130 MMOL/L — LOW (ref 135–145)
WBC # BLD: 2.86 K/UL — LOW (ref 3.8–10.5)
WBC # FLD AUTO: 2.86 K/UL — LOW (ref 3.8–10.5)

## 2021-01-19 RX ORDER — INSULIN LISPRO 100/ML
VIAL (ML) SUBCUTANEOUS
Refills: 0 | Status: DISCONTINUED | OUTPATIENT
Start: 2021-01-19 | End: 2021-01-20

## 2021-01-19 RX ADMIN — NORTRIPTYLINE HYDROCHLORIDE 10 MILLIGRAM(S): 10 CAPSULE ORAL at 15:55

## 2021-01-19 RX ADMIN — SEVELAMER CARBONATE 1600 MILLIGRAM(S): 2400 POWDER, FOR SUSPENSION ORAL at 09:18

## 2021-01-19 RX ADMIN — ERYTHROPOIETIN 4000 UNIT(S): 10000 INJECTION, SOLUTION INTRAVENOUS; SUBCUTANEOUS at 12:59

## 2021-01-19 RX ADMIN — Medication 1 MILLIGRAM(S): at 13:48

## 2021-01-19 RX ADMIN — Medication 1 GRAM(S): at 21:10

## 2021-01-19 RX ADMIN — Medication 1 GRAM(S): at 13:48

## 2021-01-19 RX ADMIN — SIMVASTATIN 40 MILLIGRAM(S): 20 TABLET, FILM COATED ORAL at 21:10

## 2021-01-19 NOTE — PROGRESS NOTE ADULT - SUBJECTIVE AND OBJECTIVE BOX
Summary:   60y  Male      Subjective:   No further episodes of vomiting     Objective:    MEDICATIONS  (STANDING):  dextrose 5%. 1000 milliLiter(s) (50 mL/Hr) IV Continuous <Continuous>  dextrose 5%. 1000 milliLiter(s) (100 mL/Hr) IV Continuous <Continuous>  epoetin beverley-epbx (RETACRIT) Injectable 4000 Unit(s) IV Push <User Schedule>  folic acid 1 milliGRAM(s) Oral daily  glucagon  Injectable 1 milliGRAM(s) IntraMuscular once  nortriptyline 10 milliGRAM(s) Oral daily  sevelamer carbonate 1600 milliGRAM(s) Oral three times a day with meals  sevelamer carbonate 800 milliGRAM(s) Oral <User Schedule>  simvastatin 40 milliGRAM(s) Oral at bedtime  sucralfate suspension 1 Gram(s) Oral four times a day    MEDICATIONS  (PRN):  ondansetron Injectable 4 milliGRAM(s) IV Push every 4 hours PRN Nausea and/or Vomiting              Vital Signs Last 24 Hrs  T(C): 36.2 (19 Jan 2021 13:11), Max: 37.1 (18 Jan 2021 16:07)  T(F): 97.2 (19 Jan 2021 13:11), Max: 98.8 (18 Jan 2021 16:07)  HR: 97 (19 Jan 2021 13:11) (80 - 97)  BP: 121/71 (19 Jan 2021 13:11) (91/54 - 121/71)  BP(mean): --  RR: 18 (19 Jan 2021 13:11) (18 - 20)  SpO2: 96% (19 Jan 2021 13:11) (93% - 98%)      General:  Well developed, well nourished, alert and active, no pallor, NAD.  HEENT:    Normal appearance of conjunctiva, ears, nose, lips, oropharynx, and oral mucosa, anicteric.  Neck:  No masses, no asymmetry.  Lymph Nodes:  No lymphadenopathy.   Cardiovascular:  RRR normal S1/S2, no murmur.  Respiratory:  CTA B/L, normal respiratory effort.   Abdominal:   soft, no masses or tenderness, normoactive BS, NT/ND, no HSM.  Extremities:   No clubbing or cyanosis, normal capillary refill, no edema.   Skin:   No rash, jaundice, lesions, eczema.   Musculoskeletal:  No joint swelling, erythema or tenderness.   Neuro: No focal deficits.   Other:       LABS:                        9.0    2.86  )-----------( 133      ( 19 Jan 2021 11:26 )             27.8     01-19    130<L>  |  92<L>  |  65<H>  ----------------------------<  232<H>  3.4<L>   |  22  |  17.50<H>    Ca    7.3<L>      19 Jan 2021 11:26  Phos  5.8     01-18  Mg     2.4     01-18            RADIOLOGY & ADDITIONAL TESTS:

## 2021-01-19 NOTE — PROGRESS NOTE ADULT - SUBJECTIVE AND OBJECTIVE BOX
[facility] : Progress Note :: [phone], (office [phone]),   Dr Mosher / Dr Washington / Dr Young / Dr Doll / Dr Varsha TURCIOS / Dr Tello / Dr Garcai / Dr Larry qiu  _____________________________________________________________________________________________    s/p HD later today    fish (Rash)  liver (Anaphylaxis)  No Known Drug Allergies    Hospital Medications:   MEDICATIONS  (STANDING):  dextrose 5%. 1000 milliLiter(s) (50 mL/Hr) IV Continuous <Continuous>  dextrose 5%. 1000 milliLiter(s) (100 mL/Hr) IV Continuous <Continuous>  epoetin beverley-epbx (RETACRIT) Injectable 4000 Unit(s) IV Push <User Schedule>  folic acid 1 milliGRAM(s) Oral daily  glucagon  Injectable 1 milliGRAM(s) IntraMuscular once  insulin lispro (ADMELOG) corrective regimen sliding scale   SubCutaneous three times a day before meals  nortriptyline 10 milliGRAM(s) Oral daily  sevelamer carbonate 1600 milliGRAM(s) Oral three times a day with meals  sevelamer carbonate 800 milliGRAM(s) Oral <User Schedule>  simvastatin 40 milliGRAM(s) Oral at bedtime  sucralfate suspension 1 Gram(s) Oral four times a day        VITALS:  T(F): 98.5 (01-19-21 @ 15:07), Max: 98.5 (01-19-21 @ 15:07)  HR: 101 (01-19-21 @ 15:07)  BP: 89/52 (01-19-21 @ 15:07)  RR: 18 (01-19-21 @ 15:07)  SpO2: 98% (01-19-21 @ 15:07)  Wt(kg): --    01-19 @ 07:01  -  01-19 @ 18:10  --------------------------------------------------------  IN: 500 mL / OUT: 1302 mL / NET: -802 mL        PHYSICAL EXAM:  Constitutional: NAD  HEENT: anicteric sclera, oropharynx clear.  Neck: No JVD  Respiratory: CTAB, no wheezes, rales or rhonchi  Cardiovascular: S1, S2, RRR  Gastrointestinal: BS+, soft, NT/ND  Extremities: No peripheral edema  Neurological: A/O x 3, no focal deficits  Vascular Access: AVF with thrill and bruit    LABS:  01-19    130<L>  |  92<L>  |  65<H>  ----------------------------<  232<H>  3.4<L>   |  22  |  17.50<H>    Ca    7.3<L>      19 Jan 2021 11:26  Phos  5.8     01-18  Mg     2.4     01-18      Creatinine Trend: 17.50 <--, 16.30 <--, 13.50 <--, 14.70 <--                        9.0    2.86  )-----------( 133      ( 19 Jan 2021 11:26 )             27.8     Urine Studies:      RADIOLOGY & ADDITIONAL STUDIES:

## 2021-01-19 NOTE — PROGRESS NOTE ADULT - SUBJECTIVE AND OBJECTIVE BOX
NP Note discussed with  Primary Attending    Patient is a 60y old  Male who presents with a chief complaint of upper GIB (18 Jan 2021 21:59)    HPI:   60 year old male from [facility] with PMHx of ESRD (on dialysis Tues/ Thurs/Sat), diabetes mellitus, hyperlipidemia, CAD, diabetic neuropathy, adrenal insufficiency, anemia, hypertension, osteoarthritis, peripheral vascular disease, lumbosacral spinal stenosis, and vision loss of the left eye and PSHx of left below the knee amputation with two recent admissions to this hospital (first one in December 2020 for intractable vomiting, second one in January 2021 for hypotension) presenting to the ED with complaints of nausea and vomiting since last night, as per patient. [facility] pt was sent for having multiple episodes of coffee ground emesis. patient reports having regular brown bowel movement. denies any history of GIb , having abdominal pain, fever or any other acute complaints. Patient states that he is still making urine, and denies any urinary complaints. history is limited to patient's incorporation . patient had 4 episode of coffee ground emesis in ED.  Last EGD in [facility] on 1/4 showed grade D esophagitis of distal 15 cm of esophagus and Duodenitis o bulb. pt was discharges on po Protonix 40 mg BID and Carafate    pt is being admitted to medicine for GIB. advanced diet yesterday, able to tolerate without GI discomfort.     INTERVAL HPI/OVERNIGHT EVENTS: no new complaints    MEDICATIONS  (STANDING):  dextrose 5%. 1000 milliLiter(s) (50 mL/Hr) IV Continuous <Continuous>  dextrose 5%. 1000 milliLiter(s) (100 mL/Hr) IV Continuous <Continuous>  epoetin beverley-epbx (RETACRIT) Injectable 4000 Unit(s) IV Push <User Schedule>  folic acid 1 milliGRAM(s) Oral daily  glucagon  Injectable 1 milliGRAM(s) IntraMuscular once  nortriptyline 10 milliGRAM(s) Oral daily  sevelamer carbonate 1600 milliGRAM(s) Oral three times a day with meals  sevelamer carbonate 800 milliGRAM(s) Oral <User Schedule>  simvastatin 40 milliGRAM(s) Oral at bedtime  sucralfate suspension 1 Gram(s) Oral four times a day    MEDICATIONS  (PRN):  ondansetron Injectable 4 milliGRAM(s) IV Push every 4 hours PRN Nausea and/or Vomiting      __________________________________________________  REVIEW OF SYSTEMS:    CONSTITUTIONAL: No fever,   EYES: no acute visual disturbances  NECK: No pain or stiffness  RESPIRATORY: No cough; No shortness of breath  CARDIOVASCULAR: No chest pain, no palpitations  GASTROINTESTINAL: No pain. No nausea or vomiting; No diarrhea   NEUROLOGICAL: No headache or numbness, no tremors  MUSCULOSKELETAL: No joint pain, no muscle pain  GENITOURINARY: no dysuria, no frequency, no hesitancy  PSYCHIATRY: no depression , no anxiety  ALL OTHER  ROS negative        Vital Signs Last 24 Hrs  T(C): 36.4 (19 Jan 2021 11:05), Max: 37.1 (18 Jan 2021 16:07)  T(F): 97.5 (19 Jan 2021 11:05), Max: 98.8 (18 Jan 2021 16:07)  HR: 90 (19 Jan 2021 11:05) (80 - 95)  BP: 102/60 (19 Jan 2021 11:05) (91/54 - 114/68)  BP(mean): --  RR: 18 (19 Jan 2021 11:05) (18 - 20)  SpO2: 97% (19 Jan 2021 11:05) (93% - 98%)    ________________________________________________  PHYSICAL EXAM:  GENERAL: NAD  HEENT: Normocephalic;  conjunctivae and sclerae clear; moist mucous membranes;   NECK : supple  CHEST/LUNG: Clear to auscultation bilaterally with good air entry   HEART: S1 S2  regular; no murmurs, gallops or rubs  ABDOMEN: Soft, Nontender, Nondistended; Bowel sounds present  EXTREMITIES: no cyanosis; no edema; no calf tenderness  SKIN: warm and dry; no rash  NERVOUS SYSTEM:  Awake and alert; Oriented  to place, person and time ; no new deficits    _________________________________________________  LABS:                        9.0    2.86  )-----------( 133      ( 19 Jan 2021 11:26 )             27.8     01-19    130<L>  |  92<L>  |  65<H>  ----------------------------<  232<H>  3.4<L>   |  22  |  17.50<H>    Ca    7.3<L>      19 Jan 2021 11:26  Phos  5.8     01-18  Mg     2.4     01-18          CAPILLARY BLOOD GLUCOSE      POCT Blood Glucose.: 222 mg/dL (19 Jan 2021 11:32)  POCT Blood Glucose.: 117 mg/dL (19 Jan 2021 07:55)        RADIOLOGY & ADDITIONAL TESTS:  < from: CT Abdomen and Pelvis No Cont (01.17.21 @ 12:01) >    EXAM:  CT ABDOMEN AND PELVIS                            PROCEDURE DATE:  01/17/2021          INTERPRETATION:  CLINICAL INFORMATION: History of esophagitis. Presents with abdominal pain, nausea and vomiting.    COMPARISON: CT abdomen pelvis dated 12/27/2020.    PROCEDURE:  CT of the Abdomen and Pelvis was performed without intravenous contrast.  Intravenous contrast: None.  Oral contrast: None.  Sagittal and coronal reformats were performed.    FINDINGS:  LOWER CHEST: Persistent thickening of distal esophagus consistent with known esophagitis.    LIVER: Within normal limits.  BILE DUCTS: Normal caliber.  GALLBLADDER: Within normal limits.  SPLEEN: Within normal limits.  PANCREAS: Within normal limits.  ADRENALS: Bilateral adrenal thickening unchanged.  KIDNEYS/URETERS: Within normal limits.    BLADDER: Bladder wall thickening.  REPRODUCTIVE ORGANS: Prostate is mildly enlarged.    BOWEL: No bowel obstruction. Appendix normal.  PERITONEUM: No ascites.  VESSELS: Atheromatous calcifications.  RETROPERITONEUM/LYMPH NODES: No lymphadenopathy.  ABDOMINAL WALL: Within normal limits.  BONES: T11-T12 fusion.    IMPRESSION:  Esophagitis.    Bladder wall thickening suggestive of cystitis.              MICHAEL ALVAREZ MD; Attending Radiologist  This document has been electronically signed. Jan 17 2021 12:23PM    < end of copied text >    Imaging Personally Reviewed:  YES  Consultant(s) Notes Reviewed:   YES    Care Discussed with Consultants : GI / nephrology     Plan of care was discussed with patient and /or primary care giver; all questions and concerns were addressed and care was aligned with patient's wishes.

## 2021-01-20 ENCOUNTER — TRANSCRIPTION ENCOUNTER (OUTPATIENT)
Age: 61
End: 2021-01-20

## 2021-01-20 VITALS — DIASTOLIC BLOOD PRESSURE: 64 MMHG | OXYGEN SATURATION: 100 % | SYSTOLIC BLOOD PRESSURE: 113 MMHG | HEART RATE: 91 BPM

## 2021-01-20 LAB
ALBUMIN SERPL ELPH-MCNC: 2.9 G/DL — LOW (ref 3.5–5)
ALP SERPL-CCNC: 111 U/L — SIGNIFICANT CHANGE UP (ref 40–120)
ALT FLD-CCNC: 13 U/L DA — SIGNIFICANT CHANGE UP (ref 10–60)
ANION GAP SERPL CALC-SCNC: 11 MMOL/L — SIGNIFICANT CHANGE UP (ref 5–17)
AST SERPL-CCNC: <3 U/L — LOW (ref 10–40)
BILIRUB SERPL-MCNC: 0.3 MG/DL — SIGNIFICANT CHANGE UP (ref 0.2–1.2)
BUN SERPL-MCNC: 40 MG/DL — HIGH (ref 7–18)
CALCIUM SERPL-MCNC: 7.2 MG/DL — LOW (ref 8.4–10.5)
CHLORIDE SERPL-SCNC: 95 MMOL/L — LOW (ref 96–108)
CO2 SERPL-SCNC: 23 MMOL/L — SIGNIFICANT CHANGE UP (ref 22–31)
CREAT SERPL-MCNC: 11.9 MG/DL — HIGH (ref 0.5–1.3)
GLUCOSE BLDC GLUCOMTR-MCNC: 233 MG/DL — HIGH (ref 70–99)
GLUCOSE BLDC GLUCOMTR-MCNC: 256 MG/DL — HIGH (ref 70–99)
GLUCOSE SERPL-MCNC: 236 MG/DL — HIGH (ref 70–99)
HCT VFR BLD CALC: 29.5 % — LOW (ref 39–50)
HGB BLD-MCNC: 9.5 G/DL — LOW (ref 13–17)
MCHC RBC-ENTMCNC: 28.7 PG — SIGNIFICANT CHANGE UP (ref 27–34)
MCHC RBC-ENTMCNC: 32.2 GM/DL — SIGNIFICANT CHANGE UP (ref 32–36)
MCV RBC AUTO: 89.1 FL — SIGNIFICANT CHANGE UP (ref 80–100)
NRBC # BLD: 0 /100 WBCS — SIGNIFICANT CHANGE UP (ref 0–0)
PLATELET # BLD AUTO: 123 K/UL — LOW (ref 150–400)
POTASSIUM SERPL-MCNC: 3.7 MMOL/L — SIGNIFICANT CHANGE UP (ref 3.5–5.3)
POTASSIUM SERPL-SCNC: 3.7 MMOL/L — SIGNIFICANT CHANGE UP (ref 3.5–5.3)
PROT SERPL-MCNC: 6.4 G/DL — SIGNIFICANT CHANGE UP (ref 6–8.3)
RBC # BLD: 3.31 M/UL — LOW (ref 4.2–5.8)
RBC # FLD: 16.1 % — HIGH (ref 10.3–14.5)
SODIUM SERPL-SCNC: 129 MMOL/L — LOW (ref 135–145)
WBC # BLD: 2.68 K/UL — LOW (ref 3.8–10.5)
WBC # FLD AUTO: 2.68 K/UL — LOW (ref 3.8–10.5)

## 2021-01-20 PROCEDURE — 86901 BLOOD TYPING SEROLOGIC RH(D): CPT

## 2021-01-20 PROCEDURE — 83735 ASSAY OF MAGNESIUM: CPT

## 2021-01-20 PROCEDURE — 99261: CPT

## 2021-01-20 PROCEDURE — 85610 PROTHROMBIN TIME: CPT

## 2021-01-20 PROCEDURE — 74176 CT ABD & PELVIS W/O CONTRAST: CPT

## 2021-01-20 PROCEDURE — 86769 SARS-COV-2 COVID-19 ANTIBODY: CPT

## 2021-01-20 PROCEDURE — 84443 ASSAY THYROID STIM HORMONE: CPT

## 2021-01-20 PROCEDURE — 80048 BASIC METABOLIC PNL TOTAL CA: CPT

## 2021-01-20 PROCEDURE — 99285 EMERGENCY DEPT VISIT HI MDM: CPT | Mod: 25

## 2021-01-20 PROCEDURE — 83036 HEMOGLOBIN GLYCOSYLATED A1C: CPT

## 2021-01-20 PROCEDURE — 97162 PT EVAL MOD COMPLEX 30 MIN: CPT

## 2021-01-20 PROCEDURE — U0005: CPT

## 2021-01-20 PROCEDURE — 86850 RBC ANTIBODY SCREEN: CPT

## 2021-01-20 PROCEDURE — 36415 COLL VENOUS BLD VENIPUNCTURE: CPT

## 2021-01-20 PROCEDURE — 87635 SARS-COV-2 COVID-19 AMP PRB: CPT

## 2021-01-20 PROCEDURE — 82962 GLUCOSE BLOOD TEST: CPT

## 2021-01-20 PROCEDURE — 93005 ELECTROCARDIOGRAM TRACING: CPT

## 2021-01-20 PROCEDURE — 85730 THROMBOPLASTIN TIME PARTIAL: CPT

## 2021-01-20 PROCEDURE — 86900 BLOOD TYPING SEROLOGIC ABO: CPT

## 2021-01-20 PROCEDURE — 80053 COMPREHEN METABOLIC PANEL: CPT

## 2021-01-20 PROCEDURE — 83690 ASSAY OF LIPASE: CPT

## 2021-01-20 PROCEDURE — 80061 LIPID PANEL: CPT

## 2021-01-20 PROCEDURE — 85027 COMPLETE CBC AUTOMATED: CPT

## 2021-01-20 PROCEDURE — 84100 ASSAY OF PHOSPHORUS: CPT

## 2021-01-20 PROCEDURE — 85025 COMPLETE CBC W/AUTO DIFF WBC: CPT

## 2021-01-20 RX ADMIN — SEVELAMER CARBONATE 1600 MILLIGRAM(S): 2400 POWDER, FOR SUSPENSION ORAL at 10:58

## 2021-01-20 RX ADMIN — Medication 2: at 12:40

## 2021-01-20 RX ADMIN — Medication 1 MILLIGRAM(S): at 12:46

## 2021-01-20 RX ADMIN — Medication 1 GRAM(S): at 12:47

## 2021-01-20 RX ADMIN — SEVELAMER CARBONATE 1600 MILLIGRAM(S): 2400 POWDER, FOR SUSPENSION ORAL at 12:46

## 2021-01-20 RX ADMIN — Medication 1 GRAM(S): at 05:34

## 2021-01-20 RX ADMIN — NORTRIPTYLINE HYDROCHLORIDE 10 MILLIGRAM(S): 10 CAPSULE ORAL at 12:46

## 2021-01-20 NOTE — PROGRESS NOTE ADULT - PROBLEM SELECTOR PLAN 1
came in for GIB due to esophagitis  s/o EGD on 1/4/2021 at [facility] which showed esophagitis, d/christy on carafate and PPI  surgical path - no malignancy   continue carafate and PPI  Dr. Marie is following  advanaced diet yesterday- tolerating well  no N/V
came in for GIB due to esophagitis  s/o EGD on 1/4/2021 at [facility] which showed esophagitis, d/christy on carafate and PPI  surgical path - no malignancy   continue carafate and PPI  Dr. Sierra is following  advanaced diet yesterday- tolerating well  no N/V
Tolerating diet

## 2021-01-20 NOTE — DISCHARGE NOTE PROVIDER - NSDCCPCAREPLAN_GEN_ALL_CORE_FT
PRINCIPAL DISCHARGE DIAGNOSIS  Diagnosis: Esophagitis  Assessment and Plan of Treatment: You underwent to upper endoscopy in 1/2021 and found to have esophagitis which can cause gastriubtestubak bleeding. Continue to take proton pump inhibitor and carafate as prescribed. You can eat regualr consistency food.   If you have sypmtoms as below then call your healthcare provider:  If you have belly pain that gets worse or doesn't go away  If you vomit blood or have black bowel movements  If you are losing weight (without trying)  Avoid NSAIDs- (Aspirin, Ibuprofen, Advil, Motrin, Naproxen, Aleve)  Avoid alcoholic beverages  Take all medicaions as prescribed.  Call your healthcare provider for a follow-up appointment within one week        SECONDARY DISCHARGE DIAGNOSES  Diagnosis: ESRD on dialysis  Assessment and Plan of Treatment: Please continue to follow your dialysis center as  reinstate your schedule - 1/21/2021   follow up with your primary provider and nephrologist for further care and monitoring of kidney function and electrolytes. Continue a renal restricted diet (Avoiding foods high in potassium and phosphorus), your prescribed medications, and supplementations as directed.   Follow up with outpatient nephrologist

## 2021-01-20 NOTE — CHART NOTE - NSCHARTNOTEFT_GEN_A_CORE
60 year old male from [facility] with PMHx of ESRD (on dialysis Tues/ Thurs/Sat), diabetes mellitus, hyperlipidemia, CAD, diabetic neuropathy, adrenal insufficiency, anemia, hypertension, osteoarthritis, peripheral vascular disease, lumbosacral spinal stenosis, and vision loss of the left eye and PSHx of left below the knee amputation with two recent admissions to this hospital (first one in December 2020 for intractable vomiting, second one in January 2021 for hypotension) presenting to the ED with complaints of nausea and vomiting since last night, as per patient. Per [facility] papers pt was sent for having multiple episodes of coffee ground emesis. patient reports having regular brown bowel movement. denies any history of GIB, having abdominal pain, fever or any other acute complaints. Patient states that he is still making urine, and denies any urinary complaints. history is limited to patient's incorporation . patient had 4 episode of coffee ground emesis in ED.  Last EGD in [facility] on 1/4 showed grade D esophagitis of distal 15 cm of esophagus and Duodenitis o bulb. pt was discharged on po Protonix 40 mg BID and Carafate. [facility] called; pt anxious to leave; cursing, not wanting to wait for GI. I have discussed with Dr. De La Torre patient current status and behavior. Dr. De La Torre to call psyche on consult. GI to see pt.       Last GI work up  1/4/21     Esophagogastroduodenoscopy Report  Indication: N and V  Referring MD:   Instrument:  #[id number]  Anesthesia: MAC  Consent:  Informed consent was obtained from the patient after providing any opportunity for questions  Procedure: The gastroscope was gently passed through the incisoral orifice into the oral cavity and under direct visualization the esophagus was intubated. The endoscope was passed down the esophagus, through the stomach and into proximal jejunum. Color, texture, mucosa and anatomy of the esophagus, stomach, and duodenum were carefully examined with the scope. The patient tolerated the procedure well. After completion of the examination, the patient was transferred to the recovery room.   Preparation: NPO   Findings:   Oropharynx	Normal  Esophagus	grade D esophagitis of distal 15 cm of esophagus  EG-junction	Normal  Cardia	Normal.  Body	Normal   Antrum	Normal bx obtained  Pylorus	Normal.  Duodenal Bulb	Mild duodenitis  2nd portion	Normal  3rd portion	Normal.  Date and time:  EBL:0  Impression: Grade D esophagitis                    Duodenitis of bulb    Plan:   F/u path  Carafate susp 10cc qid  PPI BID     Procedure Start Time: 10:10  Procedure End Time:  10:16   Maldonado Molina MD
Event: Pt pulled out IV access and refusing AM labs, despite importance of same explained.  Vital Signs Last 24 Hrs  T(C): 36.7 (18 Jan 2021 04:52), Max: 37 (18 Jan 2021 00:03)  T(F): 98 (18 Jan 2021 04:52), Max: 98.6 (18 Jan 2021 00:03)  HR: 100 (18 Jan 2021 04:52) (100 - 115)  BP: 100/66 (18 Jan 2021 04:52) (100/66 - 157/95)  BP(mean): --  RR: 18 (18 Jan 2021 04:52) (18 - 18)  SpO2: 100% (18 Jan 2021 04:52) (95% - 100%)
Seen prior to discharge  asked PT to present to HD in AM
spoke with Dr. Britt baires d/c telemetry

## 2021-01-20 NOTE — PHYSICAL THERAPY INITIAL EVALUATION ADULT - GENERAL OBSERVATIONS, REHAB EVAL
Consult received, chart reviewed. Patient received supine in bed, NAD. Lt. BKA Patient agreed to EVALUATION from Physical Therapist.

## 2021-01-20 NOTE — PROGRESS NOTE ADULT - PROBLEM SELECTOR PLAN 4
patient on HD T/T/S  HD today  continue Renvela   monitor BMP daily   SW consult.   Nephro Dr. Mosher is following
patient on HD T/T/S  due for HD tomorrow  continue Renvela   monitor BMP daily   SW consult.   Nephro Dr. Mosher is following

## 2021-01-20 NOTE — PROGRESS NOTE ADULT - PROBLEM SELECTOR PLAN 6
IMPROVE VTE Individual Risk Assessment  RISK                                                                Points  [  ] Previous VTE                                                  3  [  ] Thrombophilia                                               2  [  ] Lower limb paralysis                                      2        (unable to hold up >15 seconds)    [  ] Current Cancer                                              2         (within 6 months)  [ x ] Immobilization > 24 hrs                                1  [  ] ICU/CCU stay > 24 hours                              1  [  ] Age > 60                                                      1  IMPROVE VTE Score ____1_____  will Hold OFF given GIB  continue PPI
IMPROVE VTE Individual Risk Assessment  RISK                                                                Points  [  ] Previous VTE                                                  3  [  ] Thrombophilia                                               2  [  ] Lower limb paralysis                                      2        (unable to hold up >15 seconds)    [  ] Current Cancer                                              2         (within 6 months)  [ x ] Immobilization > 24 hrs                                1  [  ] ICU/CCU stay > 24 hours                              1  [  ] Age > 60                                                      1  IMPROVE VTE Score ____1_____  will Hold OFF given GIB  continue PPI

## 2021-01-20 NOTE — PHYSICAL THERAPY INITIAL EVALUATION ADULT - SKIN COLOR/CHARACTERISTICS
rue AVF, Dark patch , dry lateral lt. patella and lt residual limb anterio-medial rue AVF, Dark patch , dry lateral lt. patella and lt residual limb anterio-medial; lt hand ~nickel sized wound with eschar

## 2021-01-20 NOTE — PROGRESS NOTE ADULT - SUBJECTIVE AND OBJECTIVE BOX
NP Note discussed with  Primary Attending    Patient is a 60y old  Male who presents with a chief complaint of upper GIB (20 Jan 2021 10:45)      INTERVAL HPI/OVERNIGHT EVENTS: hemodynamically stable overnight, denies new complaints     MEDICATIONS  (STANDING):  dextrose 5%. 1000 milliLiter(s) (50 mL/Hr) IV Continuous <Continuous>  dextrose 5%. 1000 milliLiter(s) (100 mL/Hr) IV Continuous <Continuous>  epoetin beverley-epbx (RETACRIT) Injectable 4000 Unit(s) IV Push <User Schedule>  folic acid 1 milliGRAM(s) Oral daily  glucagon  Injectable 1 milliGRAM(s) IntraMuscular once  insulin lispro (ADMELOG) corrective regimen sliding scale   SubCutaneous three times a day before meals  nortriptyline 10 milliGRAM(s) Oral daily  sevelamer carbonate 1600 milliGRAM(s) Oral three times a day with meals  sevelamer carbonate 800 milliGRAM(s) Oral <User Schedule>  simvastatin 40 milliGRAM(s) Oral at bedtime  sucralfate suspension 1 Gram(s) Oral four times a day    MEDICATIONS  (PRN):  ondansetron Injectable 4 milliGRAM(s) IV Push every 4 hours PRN Nausea and/or Vomiting      __________________________________________________  REVIEW OF SYSTEMS:    CONSTITUTIONAL: No fever,   EYES: no acute visual disturbances  NECK: No pain or stiffness  RESPIRATORY: No cough; No shortness of breath  CARDIOVASCULAR: No chest pain, no palpitations  GASTROINTESTINAL: No pain. No nausea or vomiting; No diarrhea   NEUROLOGICAL: No headache or numbness, no tremors  MUSCULOSKELETAL: No joint pain, no muscle pain  GENITOURINARY: no dysuria, no frequency, no hesitancy  PSYCHIATRY: no depression , no anxiety  ALL OTHER  ROS negative        Vital Signs Last 24 Hrs  T(C): 36.3 (20 Jan 2021 05:46), Max: 36.9 (19 Jan 2021 15:07)  T(F): 97.3 (20 Jan 2021 05:46), Max: 98.5 (19 Jan 2021 15:07)  HR: 91 (20 Jan 2021 11:01) (90 - 101)  BP: 113/64 (20 Jan 2021 11:01) (89/52 - 135/71)  BP(mean): --  RR: 19 (20 Jan 2021 05:46) (18 - 20)  SpO2: 100% (20 Jan 2021 11:01) (96% - 100%)    ________________________________________________  PHYSICAL EXAM:  GENERAL: NAD  HEENT: Normocephalic;  conjunctivae and sclerae clear; moist mucous membranes;   NECK : supple  CHEST/LUNG: Clear to auscultation bilaterally with good air entry   HEART: S1 S2  regular; no murmurs, gallops or rubs  ABDOMEN: Soft, Nontender, Nondistended; Bowel sounds present  EXTREMITIES: L BKA  SKIN: warm and dry; no rash  HEENT: cataracts to bilateral eyes   NERVOUS SYSTEM:  Awake and alert; Oriented  to place, person and time ; no new deficits    _________________________________________________  LABS:                        9.0    2.86  )-----------( 133      ( 19 Jan 2021 11:26 )             27.8     01-19    130<L>  |  92<L>  |  65<H>  ----------------------------<  232<H>  3.4<L>   |  22  |  17.50<H>    Ca    7.3<L>      19 Jan 2021 11:26  Phos  5.8     01-18  Mg     2.4     01-18          CAPILLARY BLOOD GLUCOSE      POCT Blood Glucose.: 126 mg/dL (19 Jan 2021 21:34)  POCT Blood Glucose.: 255 mg/dL (19 Jan 2021 17:06)    < from: CT Abdomen and Pelvis No Cont (01.17.21 @ 12:01) >    IMPRESSION:  Esophagitis.    Bladder wall thickening suggestive of cystitis.            RADIOLOGY & ADDITIONAL TESTS:    Imaging  Reviewed:  YES/NO    Consultant(s) Notes Reviewed:   YES/ No      Plan of care was discussed with patient and /or primary care giver; all questions and concerns were addressed

## 2021-01-20 NOTE — PROGRESS NOTE ADULT - PROBLEM SELECTOR PLAN 5
on regular insulin sliding scale at [facility]  continue  Admelog sliding scale  HgA1c - 5.9%
on regular insulin sliding scale at [facility]  continue  Admelog sliding scale  HgA1c - 5.9%

## 2021-01-20 NOTE — DISCHARGE NOTE NURSING/CASE MANAGEMENT/SOCIAL WORK - PATIENT PORTAL LINK FT
You can access the FollowMyHealth Patient Portal offered by [facility] by registering at the following website: [email or web address]. By joining [employer]’s FollowMyHealth portal, you will also be able to view your health information using other applications (apps) compatible with our system.

## 2021-01-20 NOTE — PHYSICAL THERAPY INITIAL EVALUATION ADULT - DIAGNOSIS, PT EVAL
Pt. presents without gross impairment, other than chronic mild impaired balance and strength, and independent with functional mobility. Skilled, therapeutic PT intervention not indicated at this time.  Pt. reports he is at his baseline mobility level.

## 2021-01-20 NOTE — DISCHARGE NOTE PROVIDER - NSDCMRMEDTOKEN_GEN_ALL_CORE_FT
folic acid 1 mg oral tablet: 1 tab(s) orally once a day  glucagon 1 mg injection:   insulin regular 100 units/mL human recombinant injectable solution: 1 unit(s) injectable 2 times a day sliding scale  lidocaine 0.5% topical cream:   Pamelor 10 mg oral capsule: 1 cap(s) orally once a day  Protonix 40 mg oral delayed release tablet: 1 tab(s) orally 2 times a day  Renvela 800 mg oral tablet: 1 tab(s) orally once a day (at bedtime)  Renvela 800 mg oral tablet: 2 tab(s) orally 3 times a day (with meals)  sucralfate 1 g/10 mL oral suspension: 10 milliliter(s) orally 4 times a day  traMADol 50 mg oral tablet: 1 tab(s) orally every 8 hours, As Needed - for severe pain  Tylenol 500 mg oral tablet: 1 tab(s) orally 2 times a day, As Needed - for mild pain  Vaseline topical ointment: Apply topically to affected area once a day  Zocor 40 mg oral tablet: 1 tab(s) orally once a day (at bedtime)  Zofran 4 mg oral tablet: 1 tab(s) orally every 6 hours, As Needed - for nausea

## 2021-01-20 NOTE — PHYSICAL THERAPY INITIAL EVALUATION ADULT - GROSSLY INTACT, SENSORY
however residual limb pt stated yes even when not being touched, therefore inconclusive/Grossly Intact

## 2021-01-20 NOTE — DISCHARGE NOTE PROVIDER - HOSPITAL COURSE
60 year old male from [facility] with PMHx of ESRD (on dialysis Tues/ Thurs/Sat), diabetes mellitus, hyperlipidemia, CAD, diabetic neuropathy, adrenal insufficiency, anemia, hypertension, osteoarthritis, peripheral vascular disease, lumbosacral spinal stenosis, and vision loss of the left eye and PSHx of left below the knee amputation with two recent admissions to this hospital (first one in December 2020 for intractable vomiting, second one in January 2021 for hypotension) presenting to the ED with complaints of  coffee ground emesis. Patient states that he is still making urine, and denies any urinary complaints. Last EGD in [facility] on 1/4 showed grade D esophagitis of distal 15 cm of esophagus and Duodenitis on bulb. pt was discharges on po Protonix 40 mg BID and Carafate. Pt was admitted to medicine for GIB 2/2 esophagitis. GI symptoms subsided, hence regular diet started and tolerated well without GI discomfort. PT evaluated and pt does not need skilled PT. Thus, discussed with attending and decision was made for discharge back to [facility].

## 2021-01-20 NOTE — PHYSICAL THERAPY INITIAL EVALUATION ADULT - STANDING BALANCE: STATIC
Report called to 1D. All questions answered.       Dianna Santos, ANNETTE  05/28/20 [id number] Good w/rollator

## 2021-01-20 NOTE — PROGRESS NOTE ADULT - ASSESSMENT
60 y.o male from [facility] with a PMHx of ESRD on hemodialysis Tues, Thurs ,Sat, HTN, DM, partially blind, HLD, CAD, osteoporosis and A PSHx of a AV fistula and left BKA presents to the ED from [facility] for coffee ground emesis.   
#ESRD.  s/p HD earlier today  #  HTN, blood pressure relatively low. Pre-HD midodrine if hypotensive  # renal osteodystrophy: cont sevelamer  # anemia of CKD, Also with GIB. add epogen with HD       
60 year old male with a history of esophagitis presents with coffee ground emesis 
 60 year old male from [facility] with PMHx of ESRD (on dialysis Tues/ Thurs/Sat), diabetes mellitus, hyperlipidemia, CAD, diabetic neuropathy, adrenal insufficiency, anemia, hypertension, osteoarthritis, peripheral vascular disease, lumbosacral spinal stenosis, and vision loss of the left eye and PSHx of left below the knee amputation with two recent admissions to this hospital (first one in December 2020 for intractable vomiting, second one in January 2021 for hypotension) presenting to the ED with complaints of nausea and vomiting since last night, as per patient. [facility] papers pt was sent for having multiple episodes of coffee ground emesis. patient reports having regular brown bowel movement. denies any history of GIb , having abdominal pain, fever or any other acute complaints. Patient states that he is still making urine, and denies any urinary complaints. history is limited to patient's incorporation . patient had 4 episode of coffee ground emesis in ED.  Last EGD in [facility] on 1/4 showed grade D esophagitis of distal 15 cm of esophagus and Duodenitis o bulb. pt was discharges on po Protonix 40 mg BID and Carafate    1/20: tolerating diet, refusing laboratory work - pending CBC for trending of hemoglobin, d/w patient

## 2021-01-20 NOTE — PHYSICAL THERAPY INITIAL EVALUATION ADULT - GAIT DISTANCE, PT EVAL
150ft w/RW and 150ft w/rollator, minor lt. prosthesis pistoning, which pt reported he saw his prosthetist last month and he is being provided with additional sock/liners

## 2021-01-20 NOTE — PHYSICAL THERAPY INITIAL EVALUATION ADULT - LEVEL OF INDEPENDENCE: STAIR NEGOTIATION, REHAB EVAL
pt report he does not neogtioate stairs at baseline. Stairs not assessed at this time as pt. does not require stairs to access/negotiate home environment

## 2021-01-20 NOTE — PROGRESS NOTE ADULT - PROBLEM SELECTOR PLAN 3
history of hypotension on prior admission, was discharged on midorine 5mg TID , not reflected on [facility] papers  Monitor BP  continue midodrine as pre-HD
Advanced care planning was discussed with patient and family.  Advanced care planning forms were reviewed and discussed.  Risks, benefits and alternatives of gastroenterologic procedures were discussed in detail and all questions were answered.
history of hypotension on prior admission, was discharged on midodrine 5mg TID , not reflected on [facility] papers  Monitor BP  continue midodrine as pre-HD

## 2021-01-20 NOTE — PROGRESS NOTE ADULT - PROBLEM SELECTOR PLAN 8
pending PT  likely tomorrow 1/21: delay due to refusal of laboratory work, pending repeat COVID as last negative 1/17  SW consulted for reinstate HD
pending PT  likely tomorrow - last COVID 1/17   SW consulted for reinstate HD

## 2021-01-20 NOTE — DISCHARGE NOTE PROVIDER - CARE PROVIDER_API CALL
Dario De La Torre)  Medicine  [address] [place] [address]  Phone: [phone]  Fax: [phone]  Follow Up Time: 2 weeks

## 2021-01-28 ENCOUNTER — INPATIENT (INPATIENT)
Facility: HOSPITAL | Age: 61
LOS: 4 days | Discharge: NURSING, REHAB OR LONG-TERM CARE | DRG: 682 | End: 2021-02-02
Attending: INTERNAL MEDICINE | Admitting: INTERNAL MEDICINE
Payer: MEDICAID

## 2021-01-28 VITALS
DIASTOLIC BLOOD PRESSURE: 71 MMHG | HEART RATE: 82 BPM | TEMPERATURE: 98 F | OXYGEN SATURATION: 98 % | SYSTOLIC BLOOD PRESSURE: 143 MMHG | RESPIRATION RATE: 18 BRPM | HEIGHT: 62 IN | WEIGHT: 138.01 LBS

## 2021-01-28 DIAGNOSIS — Z98.42 CATARACT EXTRACTION STATUS, LEFT EYE: Chronic | ICD-10-CM

## 2021-01-28 DIAGNOSIS — Z98.890 OTHER SPECIFIED POSTPROCEDURAL STATES: Chronic | ICD-10-CM

## 2021-01-28 DIAGNOSIS — Z89.512 ACQUIRED ABSENCE OF LEFT LEG BELOW KNEE: Chronic | ICD-10-CM

## 2021-01-28 DIAGNOSIS — N17.9 ACUTE KIDNEY FAILURE, UNSPECIFIED: ICD-10-CM

## 2021-01-28 DIAGNOSIS — Z98.41 CATARACT EXTRACTION STATUS, RIGHT EYE: Chronic | ICD-10-CM

## 2021-01-28 LAB
ALBUMIN SERPL ELPH-MCNC: 2.9 G/DL — LOW (ref 3.5–5)
ALP SERPL-CCNC: 156 U/L — HIGH (ref 40–120)
ALT FLD-CCNC: 8 U/L DA — LOW (ref 10–60)
ANION GAP SERPL CALC-SCNC: 21 MMOL/L — HIGH (ref 5–17)
AST SERPL-CCNC: <3 U/L — LOW (ref 10–40)
BASOPHILS # BLD AUTO: 0.02 K/UL — SIGNIFICANT CHANGE UP (ref 0–0.2)
BASOPHILS NFR BLD AUTO: 0.5 % — SIGNIFICANT CHANGE UP (ref 0–2)
BILIRUB SERPL-MCNC: 0.4 MG/DL — SIGNIFICANT CHANGE UP (ref 0.2–1.2)
BUN SERPL-MCNC: 97 MG/DL — HIGH (ref 7–18)
CALCIUM SERPL-MCNC: 7.7 MG/DL — LOW (ref 8.4–10.5)
CHLORIDE SERPL-SCNC: 98 MMOL/L — SIGNIFICANT CHANGE UP (ref 96–108)
CO2 SERPL-SCNC: 13 MMOL/L — LOW (ref 22–31)
CREAT SERPL-MCNC: 25 MG/DL — HIGH (ref 0.5–1.3)
EOSINOPHIL # BLD AUTO: 0.12 K/UL — SIGNIFICANT CHANGE UP (ref 0–0.5)
EOSINOPHIL NFR BLD AUTO: 3.2 % — SIGNIFICANT CHANGE UP (ref 0–6)
GLUCOSE SERPL-MCNC: 201 MG/DL — HIGH (ref 70–99)
HCT VFR BLD CALC: 27.6 % — LOW (ref 39–50)
HGB BLD-MCNC: 8.9 G/DL — LOW (ref 13–17)
IMM GRANULOCYTES NFR BLD AUTO: 0.3 % — SIGNIFICANT CHANGE UP (ref 0–1.5)
LYMPHOCYTES # BLD AUTO: 0.7 K/UL — LOW (ref 1–3.3)
LYMPHOCYTES # BLD AUTO: 18.6 % — SIGNIFICANT CHANGE UP (ref 13–44)
MCHC RBC-ENTMCNC: 28.4 PG — SIGNIFICANT CHANGE UP (ref 27–34)
MCHC RBC-ENTMCNC: 32.2 GM/DL — SIGNIFICANT CHANGE UP (ref 32–36)
MCV RBC AUTO: 88.2 FL — SIGNIFICANT CHANGE UP (ref 80–100)
MONOCYTES # BLD AUTO: 0.2 K/UL — SIGNIFICANT CHANGE UP (ref 0–0.9)
MONOCYTES NFR BLD AUTO: 5.3 % — SIGNIFICANT CHANGE UP (ref 2–14)
NEUTROPHILS # BLD AUTO: 2.71 K/UL — SIGNIFICANT CHANGE UP (ref 1.8–7.4)
NEUTROPHILS NFR BLD AUTO: 72.1 % — SIGNIFICANT CHANGE UP (ref 43–77)
NRBC # BLD: 0 /100 WBCS — SIGNIFICANT CHANGE UP (ref 0–0)
PLATELET # BLD AUTO: 130 K/UL — LOW (ref 150–400)
POTASSIUM SERPL-MCNC: 4.1 MMOL/L — SIGNIFICANT CHANGE UP (ref 3.5–5.3)
POTASSIUM SERPL-SCNC: 4.1 MMOL/L — SIGNIFICANT CHANGE UP (ref 3.5–5.3)
PROT SERPL-MCNC: 6.3 G/DL — SIGNIFICANT CHANGE UP (ref 6–8.3)
RBC # BLD: 3.13 M/UL — LOW (ref 4.2–5.8)
RBC # FLD: 18.1 % — HIGH (ref 10.3–14.5)
SODIUM SERPL-SCNC: 132 MMOL/L — LOW (ref 135–145)
TROPONIN I SERPL-MCNC: <0.015 NG/ML — SIGNIFICANT CHANGE UP (ref 0–0.04)
WBC # BLD: 3.76 K/UL — LOW (ref 3.8–10.5)
WBC # FLD AUTO: 3.76 K/UL — LOW (ref 3.8–10.5)

## 2021-01-28 PROCEDURE — 99285 EMERGENCY DEPT VISIT HI MDM: CPT

## 2021-01-28 PROCEDURE — 71045 X-RAY EXAM CHEST 1 VIEW: CPT | Mod: 26

## 2021-01-28 RX ORDER — HALOPERIDOL DECANOATE 100 MG/ML
5 INJECTION INTRAMUSCULAR ONCE
Refills: 0 | Status: DISCONTINUED | OUTPATIENT
Start: 2021-01-28 | End: 2021-01-28

## 2021-01-28 RX ADMIN — Medication 2 MILLIGRAM(S): at 23:10

## 2021-01-28 NOTE — ED PROVIDER NOTE - WR INTERPRETATION 1
NEUROLOGY CLINIC NOTE    Patient ID:  South Lu  [id number]  86 y.o.  1965    Date of Visit:  August 1, 2019    Referring Physician: Dr. Arauz Organ    Reason for Visit:  headaches  Chief Complaint   Patient presents with    Follow-up       History of Present Illness: There are no active problems to display for this patient. Past Medical History:   Diagnosis Date    Cancer [facility])     Breast    Thyroid disease       Past Surgical History:   Procedure Laterality Date    BREAST SURGERY PROCEDURE UNLISTED      HX HYSTERECTOMY      HX TONSILLECTOMY        Prior to Admission medications    Medication Sig Start Date End Date Taking? Authorizing Provider   liraglutide (SAXENDA) 3 mg/0.5 mL (18 mg/3 mL) pen 3 mg by SubCUTAneous route daily. Yes Provider, Historical   spironolactone (ALDACTONE) 50 mg tablet Take  by mouth daily. Yes Provider, Historical   levothyroxine (SYNTHROID) 50 mcg tablet Take  by mouth Daily (before breakfast). Yes Provider, Historical   estradiol (VAGIFEM) 10 mcg tab vaginal tablet Insert 10 mcg into vagina daily. Yes Provider, Historical   loratadine (CLARITIN) 10 mg tablet Take 10 mg by mouth. Yes Provider, Historical   Biotin 2,500 mcg cap Take  by mouth. Yes Provider, Historical   multivitamin (ONE A DAY) tablet Take 1 Tab by mouth daily. Yes Provider, Historical   cholecalciferol (VITAMIN D3) 1,000 unit tablet Take  by mouth daily. Yes Provider, Historical   cyclobenzaprine (FLEXERIL) 10 mg tablet Take 1 Tab by mouth three (3) times daily as needed for Muscle Spasm(s).  Take 1 at bedtime x 1 wk; then 1 BID x 1 wk; then 1 TID 4/22/19   Ayaan Faith MD     Allergies   Allergen Reactions    Codeine Nausea and Vomiting    Compazine [Prochlorperazine] Rash    Dexamethasone Other (comments)     Tachycardia      Sulfa (Sulfonamide Antibiotics) Nausea and Vomiting    Tetracycline Rash      Social History     Tobacco Use    Smoking status: Never Smoker    Smokeless tobacco: Never Used   Substance Use Topics    Alcohol use: Yes     Alcohol/week: 1.7 standard drinks     Types: 2 Glasses of wine per week      No family history on file. Subjective:      Beny Gonzalez is a 49 y.o. XUYU with history of breast cancer and thyroid disease who was referred here by Dr. Zack Wang for further evaluation of his headaches. Per patient she has had two episodes of headaches. First episode was 2/17/2019 while having sex. Abrupt onset of back of the head pain. Like a baseball bat hit her from behind the head. 10/10 and worst pain ever in her life. She could not breath or speak, just held the back of her head due to the pain. No associated nausea or vomiting. Lasted for 1 hour. Residual headache 1-2/10 for 2 weeks. Did not try to take any OTC medications. Per patient she saw her PCP. Note from Dr. Bertha Lopez dated 2/27/2019 was reviewed. Mentions the headache during sex. Ordered a brain MRA which likely was not approved by her insurance. Patient was advised to go to the nearest ER if symptoms recur. Patient then referred here. Few weeks PTC while doing physical therapy for her feet she had another bout of same but less intense headache. She was laying down and pushing her feet on a resistance. Develop same headache but less intense. Back of the head pain, 4-5/10, pressure pain at the occiput. Lasting until the evening and went away after several days. None since then. Since the last visit on 5/23/2019, patient reports sustained improvement of her condition. No recurrence of severe headache. Also less frequency of head twinges. Mostly triggered by using too many pillows or bending her head forward reading or using computers. Nose today and rest with relief. Patient did try increasing the cyclobenzaprine during the day but could not tolerate the sedating effect. The patient has since stopped taking the medication.  Patient is aware of her posture and tries to correct it. She is doing the neck and shoulder exercises. Patient was undergoing physical therapy with benefit but had to stop due to billing issues. She takes as needed ibuprofen for soreness down her neck after physical therapy with relief. No new complaints. Outside reports reviewed: none    Review of Systems:    A comprehensive review of systems was performed and was negative      Objective:     Visit Vitals  /74   Pulse 98   Ht 5' 8\" (1.727 m)   Wt 157 lb (71.2 kg)   SpO2 98%   BMI 23.87 kg/m²       PHYSICAL EXAM:    NEUROLOGICAL EXAM:    Appearance: The patient is well developed, well nourished, provides a coherent history and is in no acute distress. Mental Status: Oriented to time, place and person. Fluent, no aphasia or dysarthria. Mood and affect appropriate. Cranial Nerves:   II - XII were intact        Motor:  5/5 strength. Normal bulk and tone. No pronator drift. Reflexes:   Deep tendon reflexes 2+/4 and symmetrical.    Sensory:   Normal to touch, pinprick and vibration. Gait:  Normal gait. No Romberg. Tremor:   No tremor noted. Cerebellar:  Intact FTN/LISA/HTS. Cervical range of motion measurement:       Degrees   Head flexion   65 (70)   Head extension  42 (42)   Right lateral head flexion 25 (30)   Left lateral head flexion 35 (30)   Right head rotation  75 (65)   Left head rotation  82 (80)    Mild to moderate restriction head extension and right lateral head flexion; mild restriction left lateral head flexion and right head rotation  Anterior head posture (2 FB off shoulder) with shoulders rotated in    Assessment:   Cervicogenic headache - improving  Cervical dystonia - persistent    Plan:   Neurological examination is unchanged. It is still nonfocal.  Still no indication currently to do any neuroimaging. History and exam reveals elements of cervicogenic headaches and occipital neuralgia due to poor anterior head posture.   Patient was advised to correct her anterior head posture. Off of cyclobenzaprine due to sedating effect. Continue ibuprofen 600 to 800 mg or Aleve 440 mg x 30 minutes to an hour prior to any planned sexual activity. Okay to take over-the-counter medications for abortive therapy. Continue neck and shoulder exercises. Patient was advised to go to the nearest emergency room if severe headaches occur with other neurological deficits. Exam reveals findings typically seen in cervical dystonia. Patient has an anterior head posture with shoulders rotated then. Range of motion measurements was done and revealed restrictions but with some improvement. The patient was encouraged to continue to correct her anterior head posture. Use headrest at work, at home and while driving. Use wireless headsets. Do not carry anything on the shoulder. Use only one pillow at most when sleeping at night. Use a soft collar. Obtain back support and posture support. Continue physical therapy for more aggressive manipulation when able. Trial of Chlorzoxazone 500 mg daily and up to TID if necessary. Prescription was provided. Potential trial of anti-spasticity medications (tizanidine or baclofen) if condition worsens. If conservative management fails then patient may be a candidate for chemodenervation with Botox under EMG guidance. All questions and concerns were answered. This note was created using voice recognition software. Despite editing, there may be syntax errors. no acute process

## 2021-01-28 NOTE — ED ADULT NURSE NOTE - OBJECTIVE STATEMENT
the patient is a 60 yr old male complaining of vomiting and diarrhea . pt refusing to talk . pt refusing blood draw ,iv and all treatments inspite of explaining to him .  made aware

## 2021-01-28 NOTE — ED PROVIDER NOTE - CHIEF COMPLAINT
Order placed for ultrasound if they feel that is more appropriate    Kiley Craft, DO     The patient is a 60y Male complaining of diarrhea.

## 2021-01-28 NOTE — ED PROVIDER NOTE - OBJECTIVE STATEMENT
61 y/o M pt with a PMHx of CKD, CAD, DM, ESRD on dialysis, HLD, HTN, sent to ED from [facility] by Dr. De La Torre since patient missed dialysis for about 1 week. Patient refused to have dialysis or to be sent to the hospital, therefore Dr. De La Torre stated they signed a 2 physician consent for treatment. Patient denies history but he is a limited historian due to being uncooperative with most of the history. No reported shortness of breath or fever.

## 2021-01-28 NOTE — ED PROVIDER NOTE - PROGRESS NOTE DETAILS
Pt still consistently refusing any blood tests or IV placement, screaming loudly at staff with aggressive tone. Code grey was eventually called as Pt continually escalating and could not be verbally redirected.  Security came and then Pt finally allowed nurse to draw blood and place IV.  He has no signs of fluid overload, hyperkalemia, or other acute emergency.    Dr. De La Torre accepts admission.  Paged Dr. Myers (per Dr. De La Torre's request for nephrology) numerous times with no response.  Dr. De La Torre aware and says that his team will discuss with nephrology for non-emergent dialysis. Code grey was eventually called as Pt continually escalating and could not be verbally redirected.  Security came and then Pt finally allowed nurse to draw blood and place IV.  He has no signs of fluid overload, hyperkalemia, or other acute emergency.  He is not demonstrating understanding of his condition and treatment recommendations and does not demonstrate appropriate medical decision making capacity.  Ativan ordered to calm patient to allow appropriate treatment and testing.  Dr. De La Torre accepts admission.  Paged Dr. Myers (per Dr. De La Torre's request for nephrology) numerous times with no response.  Dr. De La Torre aware and says that his team will discuss with nephrology for non-emergent dialysis.

## 2021-01-28 NOTE — ED PROVIDER NOTE - CARE PLAN
Principal Discharge DX:	Acute renal failure superimposed on chronic kidney disease, unspecified CKD stage, unspecified acute renal failure type

## 2021-01-29 ENCOUNTER — TRANSCRIPTION ENCOUNTER (OUTPATIENT)
Age: 61
End: 2021-01-29

## 2021-01-29 DIAGNOSIS — F43.21 ADJUSTMENT DISORDER WITH DEPRESSED MOOD: ICD-10-CM

## 2021-01-29 DIAGNOSIS — F14.21 COCAINE DEPENDENCE, IN REMISSION: ICD-10-CM

## 2021-01-29 DIAGNOSIS — N18.6 END STAGE RENAL DISEASE: ICD-10-CM

## 2021-01-29 DIAGNOSIS — E78.5 HYPERLIPIDEMIA, UNSPECIFIED: ICD-10-CM

## 2021-01-29 DIAGNOSIS — E11.9 TYPE 2 DIABETES MELLITUS WITHOUT COMPLICATIONS: ICD-10-CM

## 2021-01-29 DIAGNOSIS — Z29.9 ENCOUNTER FOR PROPHYLACTIC MEASURES, UNSPECIFIED: ICD-10-CM

## 2021-01-29 DIAGNOSIS — Z04.6 ENCOUNTER FOR GENERAL PSYCHIATRIC EXAMINATION, REQUESTED BY AUTHORITY: ICD-10-CM

## 2021-01-29 DIAGNOSIS — D61.818 OTHER PANCYTOPENIA: ICD-10-CM

## 2021-01-29 DIAGNOSIS — E87.2 ACIDOSIS: ICD-10-CM

## 2021-01-29 LAB
ALBUMIN SERPL ELPH-MCNC: 2.8 G/DL — LOW (ref 3.5–5)
ALP SERPL-CCNC: 148 U/L — HIGH (ref 40–120)
ALT FLD-CCNC: 6 U/L DA — LOW (ref 10–60)
AMMONIA BLD-MCNC: 80 UMOL/L — HIGH (ref 11–32)
ANION GAP SERPL CALC-SCNC: 19 MMOL/L — HIGH (ref 5–17)
AST SERPL-CCNC: <3 U/L — LOW (ref 10–40)
BILIRUB SERPL-MCNC: 0.5 MG/DL — SIGNIFICANT CHANGE UP (ref 0.2–1.2)
BUN SERPL-MCNC: 107 MG/DL — HIGH (ref 7–18)
CALCIUM SERPL-MCNC: 7.8 MG/DL — LOW (ref 8.4–10.5)
CHLORIDE SERPL-SCNC: 102 MMOL/L — SIGNIFICANT CHANGE UP (ref 96–108)
CHOLEST SERPL-MCNC: 138 MG/DL — SIGNIFICANT CHANGE UP
CO2 SERPL-SCNC: 12 MMOL/L — LOW (ref 22–31)
CREAT SERPL-MCNC: 25.7 MG/DL — HIGH (ref 0.5–1.3)
ERYTHROCYTE [SEDIMENTATION RATE] IN BLOOD: 16 MM/HR — SIGNIFICANT CHANGE UP (ref 0–20)
GLUCOSE BLDC GLUCOMTR-MCNC: 103 MG/DL — HIGH (ref 70–99)
GLUCOSE SERPL-MCNC: 132 MG/DL — HIGH (ref 70–99)
HCT VFR BLD CALC: 26 % — LOW (ref 39–50)
HDLC SERPL-MCNC: 56 MG/DL — SIGNIFICANT CHANGE UP
HGB BLD-MCNC: 8.5 G/DL — LOW (ref 13–17)
IRON SATN MFR SERPL: 135 % — HIGH (ref 20–55)
IRON SATN MFR SERPL: 154 UG/DL — SIGNIFICANT CHANGE UP (ref 65–170)
LIPID PNL WITH DIRECT LDL SERPL: 69 MG/DL — SIGNIFICANT CHANGE UP
MAGNESIUM SERPL-MCNC: 3.3 MG/DL — HIGH (ref 1.6–2.6)
MCHC RBC-ENTMCNC: 28.3 PG — SIGNIFICANT CHANGE UP (ref 27–34)
MCHC RBC-ENTMCNC: 32.7 GM/DL — SIGNIFICANT CHANGE UP (ref 32–36)
MCV RBC AUTO: 86.7 FL — SIGNIFICANT CHANGE UP (ref 80–100)
NON HDL CHOLESTEROL: 82 MG/DL — SIGNIFICANT CHANGE UP
NRBC # BLD: 0 /100 WBCS — SIGNIFICANT CHANGE UP (ref 0–0)
PHOSPHATE SERPL-MCNC: 4.3 MG/DL — SIGNIFICANT CHANGE UP (ref 2.5–4.5)
PLATELET # BLD AUTO: 125 K/UL — LOW (ref 150–400)
POTASSIUM SERPL-MCNC: 4.9 MMOL/L — SIGNIFICANT CHANGE UP (ref 3.5–5.3)
POTASSIUM SERPL-SCNC: 4.9 MMOL/L — SIGNIFICANT CHANGE UP (ref 3.5–5.3)
PROT SERPL-MCNC: 6.3 G/DL — SIGNIFICANT CHANGE UP (ref 6–8.3)
RBC # BLD: 3 M/UL — LOW (ref 4.2–5.8)
RBC # FLD: 18 % — HIGH (ref 10.3–14.5)
SARS-COV-2 RNA SPEC QL NAA+PROBE: SIGNIFICANT CHANGE UP
SODIUM SERPL-SCNC: 133 MMOL/L — LOW (ref 135–145)
TIBC SERPL-MCNC: 114 UG/DL — LOW (ref 250–450)
TRIGL SERPL-MCNC: 63 MG/DL — SIGNIFICANT CHANGE UP
TSH SERPL-MCNC: 1.92 UU/ML — SIGNIFICANT CHANGE UP (ref 0.34–4.82)
UIBC SERPL-MCNC: -40 UG/DL — LOW (ref 110–370)
WBC # BLD: 3.45 K/UL — LOW (ref 3.8–10.5)
WBC # FLD AUTO: 3.45 K/UL — LOW (ref 3.8–10.5)

## 2021-01-29 PROCEDURE — 90792 PSYCH DIAG EVAL W/MED SRVCS: CPT

## 2021-01-29 RX ORDER — ONDANSETRON 8 MG/1
4 TABLET, FILM COATED ORAL EVERY 6 HOURS
Refills: 0 | Status: DISCONTINUED | OUTPATIENT
Start: 2021-01-29 | End: 2021-02-02

## 2021-01-29 RX ORDER — ERYTHROPOIETIN 10000 [IU]/ML
10000 INJECTION, SOLUTION INTRAVENOUS; SUBCUTANEOUS
Refills: 0 | Status: DISCONTINUED | OUTPATIENT
Start: 2021-01-29 | End: 2021-02-02

## 2021-01-29 RX ORDER — TRAMADOL HYDROCHLORIDE 50 MG/1
50 TABLET ORAL EVERY 8 HOURS
Refills: 0 | Status: DISCONTINUED | OUTPATIENT
Start: 2021-01-29 | End: 2021-01-29

## 2021-01-29 RX ORDER — INSULIN LISPRO 100/ML
VIAL (ML) SUBCUTANEOUS
Refills: 0 | Status: DISCONTINUED | OUTPATIENT
Start: 2021-01-29 | End: 2021-02-02

## 2021-01-29 RX ORDER — NORTRIPTYLINE HYDROCHLORIDE 10 MG/1
10 CAPSULE ORAL DAILY
Refills: 0 | Status: DISCONTINUED | OUTPATIENT
Start: 2021-01-29 | End: 2021-02-01

## 2021-01-29 RX ORDER — FOLIC ACID 0.8 MG
1 TABLET ORAL DAILY
Refills: 0 | Status: DISCONTINUED | OUTPATIENT
Start: 2021-01-29 | End: 2021-02-02

## 2021-01-29 RX ORDER — SEVELAMER CARBONATE 2400 MG/1
1600 POWDER, FOR SUSPENSION ORAL
Refills: 0 | Status: DISCONTINUED | OUTPATIENT
Start: 2021-01-29 | End: 2021-02-02

## 2021-01-29 RX ORDER — HEPARIN SODIUM 5000 [USP'U]/ML
5000 INJECTION INTRAVENOUS; SUBCUTANEOUS EVERY 8 HOURS
Refills: 0 | Status: DISCONTINUED | OUTPATIENT
Start: 2021-01-29 | End: 2021-02-02

## 2021-01-29 RX ORDER — SODIUM BICARBONATE 1 MEQ/ML
150 SYRINGE (ML) INTRAVENOUS ONCE
Refills: 0 | Status: DISCONTINUED | OUTPATIENT
Start: 2021-01-29 | End: 2021-01-29

## 2021-01-29 RX ORDER — SEVELAMER CARBONATE 2400 MG/1
800 POWDER, FOR SUSPENSION ORAL
Refills: 0 | Status: DISCONTINUED | OUTPATIENT
Start: 2021-01-29 | End: 2021-02-02

## 2021-01-29 RX ORDER — SIMVASTATIN 20 MG/1
40 TABLET, FILM COATED ORAL AT BEDTIME
Refills: 0 | Status: DISCONTINUED | OUTPATIENT
Start: 2021-01-29 | End: 2021-02-02

## 2021-01-29 RX ORDER — SUCRALFATE 1 G
1 TABLET ORAL
Refills: 0 | Status: DISCONTINUED | OUTPATIENT
Start: 2021-01-29 | End: 2021-02-02

## 2021-01-29 RX ORDER — PETROLATUM,WHITE
1 JELLY (GRAM) TOPICAL DAILY
Refills: 0 | Status: DISCONTINUED | OUTPATIENT
Start: 2021-01-29 | End: 2021-02-02

## 2021-01-29 RX ORDER — SODIUM BICARBONATE 1 MEQ/ML
650 SYRINGE (ML) INTRAVENOUS THREE TIMES A DAY
Refills: 0 | Status: DISCONTINUED | OUTPATIENT
Start: 2021-01-29 | End: 2021-02-02

## 2021-01-29 RX ORDER — PANTOPRAZOLE SODIUM 20 MG/1
40 TABLET, DELAYED RELEASE ORAL
Refills: 0 | Status: DISCONTINUED | OUTPATIENT
Start: 2021-01-29 | End: 2021-02-02

## 2021-01-29 RX ADMIN — ERYTHROPOIETIN 10000 UNIT(S): 10000 INJECTION, SOLUTION INTRAVENOUS; SUBCUTANEOUS at 21:42

## 2021-01-29 RX ADMIN — HEPARIN SODIUM 5000 UNIT(S): 5000 INJECTION INTRAVENOUS; SUBCUTANEOUS at 05:02

## 2021-01-29 NOTE — H&P ADULT - PROBLEM SELECTOR PLAN 4
Pt noted to have pancytopenia with low hgb, wbc, and plts; chronic   Not acute bleeding, or infection concern at this time   will send haptoglobin, ldh  keep monitoring cbc   transfuse if hgb <7

## 2021-01-29 NOTE — BEHAVIORAL HEALTH ASSESSMENT NOTE - SUMMARY
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 Psych consult was requested for capacity eval due to pt's refusal of HD and EGD, which was recommended for w/u of RLQ pain. On exam, pt presents drowsy and describes himself as "confused," c/w acute delirium 2/2 medical condition (acute on chronic renal failure). Pt does NOT appear to have capacity to refuse HD or EGD at this time, which he seems to acknowledge by describing himself as confused, but he does express assent with the care which has been recommended and denies SI. Pt has HCP (brother Georgi King) who has been contacted and strongly agrees with all recommended care, so pt should receive treatment promptly with brother's consent. Pt does not appear to present an acute risk of harm to self or others at the time of assessment, and does not appear to be in need of admission to IP psych at the time of assessment. 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 Psych consult was requested for capacity eval due to pt's recent refusal of HD and primary team recommendation that he relocate from [facility] to NH with onsite dialysis to improve his compliance with sessions. On exam, pt presents irritable with an angry edge and describes himself as "hateful," which he attributes to intense dislike of dialysis. Pt appears to have a good understanding of his medical condition, the recommended treatment (HD) and the possible consequences of refusal ("I know I would die"), denies depression and denies SI. Pt appears to have the capacity to refuse HD, but states that he is not doing so at this time (this is c/w his behavior during previous hospital admissions for the same complaint). Pt also expresses firm opposition to relocating to a nursing home with onsite dialysis, and he appears to have capacity to do so. Pt does not appear to present an acute risk of harm to self or others at the time of assessment, and does not appear to be in need of admission to  psych at the time of assessment.

## 2021-01-29 NOTE — DISCHARGE NOTE PROVIDER - NSDCMRMEDTOKEN_GEN_ALL_CORE_FT
folic acid 1 mg oral tablet: 1 tab(s) orally once a day  glucagon 1 mg injection:   insulin regular 100 units/mL human recombinant injectable solution: 1 unit(s) injectable 2 times a day sliding scale  lidocaine 0.5% topical cream:   Pamelor 10 mg oral capsule: 1 cap(s) orally once a day  Protonix 40 mg oral delayed release tablet: 1 tab(s) orally 2 times a day  Renvela 800 mg oral tablet: 1 tab(s) orally once a day (at bedtime)  Renvela 800 mg oral tablet: 2 tab(s) orally 3 times a day (with meals)  sucralfate 1 g/10 mL oral suspension: 10 milliliter(s) orally 4 times a day  traMADol 50 mg oral tablet: 1 tab(s) orally every 8 hours, As Needed - for severe pain  Tylenol 500 mg oral tablet: 1 tab(s) orally 2 times a day, As Needed - for mild pain  Vaseline topical ointment: Apply topically to affected area once a day  Zocor 40 mg oral tablet: 1 tab(s) orally once a day (at bedtime)  Zofran 4 mg oral tablet: 1 tab(s) orally every 6 hours, As Needed - for nausea   folic acid 1 mg oral tablet: 1 tab(s) orally once a day  insulin regular 100 units/mL human recombinant injectable solution: 1 unit(s) injectable 2 times a day sliding scale  lidocaine 0.5% topical cream:   Pamelor 10 mg oral capsule: 1 cap(s) orally once a day  Protonix 40 mg oral delayed release tablet: 1 tab(s) orally 2 times a day  Renvela 800 mg oral tablet: 1 tab(s) orally once a day (at bedtime)  Renvela 800 mg oral tablet: 2 tab(s) orally 3 times a day (with meals)  sucralfate 1 g/10 mL oral suspension: 10 milliliter(s) orally 4 times a day  traMADol 50 mg oral tablet: 1 tab(s) orally every 8 hours, As Needed - for severe pain  Vaseline topical ointment: Apply topically to affected area once a day  Zocor 40 mg oral tablet: 1 tab(s) orally once a day (at bedtime)  Zofran 4 mg oral tablet: 1 tab(s) orally every 6 hours, As Needed - for nausea

## 2021-01-29 NOTE — BEHAVIORAL HEALTH ASSESSMENT NOTE - OTHER
Acute on chronic renal failure requiring HD H/o cocaine use DO, sober for past 3 yrs Bed-bound [facility] "Confused" See above Edentulous Edentulous, s/p L BKA "Hateful"

## 2021-01-29 NOTE — BEHAVIORAL HEALTH ASSESSMENT NOTE - NSBHCONSULTRECOMMENDOTHER_PSY_A_CORE FT
1. Pt does NOT demonstrate capacity to refuse HD and EGD, but does express assent with recommended care. Brother/HCP has been contacted and has consented on pt's behalf  2. Pt should be treated with brother's consent due to urgency of his medical condition, which has potential to cause death within 24h  3. If needed to increase comfort for procedures, recommend Ativan 1 mg IV once prior to procedure (can repeat dose if initial 1 mg is not effective)  4. Psychiatry is signing off. Reconsult if additional issues arise as inpatient  5. Brother Georgi King ([phone]) requests primary team call him from pt's room so he can encourage pt to cooperate with care  6. Case d/w primary resident Dr. Guerrero and attending Dr. Michele Phelan MD  Director, Consultation-Liaison Psychiatry Service  [id number] 1. Pt DOES demonstrate capacity to refuse HD and relocation to a nursing home with onsite HD, but states that he is not refusing HD at this time  2. No indication for standing or PRN psychotropic medications at this time  3. Psychiatry is signing off. Reconsult if additional issues arise as inpatient  4. Brother Georgi King ([phone]) has been helpful during past admissions in encouraging pt to cooperate with care  5. Case d/w attending Dr. Britt Phelan MD  Director, Consultation-Liaison Psychiatry Service  [id number]

## 2021-01-29 NOTE — DISCHARGE NOTE PROVIDER - NSDCFUSCHEDAPPT_GEN_ALL_CORE_FT
KIAN VALERO ; 02/22/2021 ; [facility] [address] KIAN Estrada ; 02/22/2021 ; [facility] [address]

## 2021-01-29 NOTE — DISCHARGE NOTE PROVIDER - NSDCCPCAREPLAN_GEN_ALL_CORE_FT
PRINCIPAL DISCHARGE DIAGNOSIS  Diagnosis: Acute renal failure superimposed on chronic kidney disease, unspecified CKD stage, unspecified acute renal failure type  Assessment and Plan of Treatment: You missed dialysis for about 1 week. You were admitted for non emergent HD inpatient .  You were seen by psychaitry and You were found to have the capacity to refuse HD, You agreed for haemodialysis in the hospital.      SECONDARY DISCHARGE DIAGNOSES  Diagnosis: Pancytopenia  Assessment and Plan of Treatment: You were noted to have pancytopenia with low hgb, wbc, and plts; likely chronic condition, there wasNot acute bleeding, or infection concern at this time .      Diagnosis: Diabetes mellitus  Assessment and Plan of Treatment: Maintaining blood glucose level within normal range.  - You have a history of diabetes  - You should continue to take your medication regimen regularly as prescribed  - Please follow up with your primary care provider/endocrinologist within a week of discharge.  - You need to continue monitoring your blood sugar levels closely.  - Please maintain healthy lifestyle by eating healthy diabetic regimen, weight loss and exercise regularly as tolerated.  Make sure you get your HgA1c checked every three months.  It's important not to skip any meals.  Keep a log of your blood glucose results and always take it with you to your doctor appointments.  Keep a list of your current medications including injectables and over the counter medications and bring this medication list with you to all your doctor appointments.  If you have not seen your ophthalmologist this year call for appointment.  Check your feet daily for redness, sores, or openings. Do not self treat. If no improvement in two days call your primary care physician for an appointment.  Low blood sugar (hypoglycemia) is a blood sugar below 70mg/dl. Check your blood sugar if you feel signs/symptoms of hypoglycemia. If your blood sugar is below 70 take 15 grams of carbohydrates (ex 4 oz of apple juice, 3-4 glucose tablets, or 4-6 oz of regular soda) wait 15 minutes and repeat blood sugar to make sure it comes up above 70.  If your blood sugar is above 70 and you are due for a meal, have a meal.  If you are not due for a meal have a snack.  This snack helps keeps your blood sugar at a safe range.      Diagnosis: Encounter for general psychiatric examination, requested by authority  Assessment and Plan of Treatment: You were assessed by psychaitry for capacity, and you do have the capacity to refuse haemodialysis.    Diagnosis: Metabolic acidosis  Assessment and Plan of Treatment: You presented with Metabolic acidosis. You missed dialysis for about 1 week. You were admitted for non emergent HD inpatient .  You were seen by psychaitry and You were found to have the capacity to refuse HD, You agreed for haemodialysis in the hospital.    Diagnosis: HLD (hyperlipidemia)  Assessment and Plan of Treatment: You had past history of HLD (hyperlipidemia).  Continue with home medication and follow up with your PCP       PRINCIPAL DISCHARGE DIAGNOSIS  Diagnosis: Acute renal failure superimposed on chronic kidney disease, unspecified CKD stage, unspecified acute renal failure type  Assessment and Plan of Treatment: You missed dialysis for about 1 week. You were admitted for non emergent HD inpatient .  You were seen by psychaitry and You were found to have the capacity to refuse HD, You agreed for haemodialysis in the hospital but did not  complete it .You were seen by nephrology . You are to  be discharged to nursing home with onsite dialysis.      SECONDARY DISCHARGE DIAGNOSES  Diagnosis: Pancytopenia  Assessment and Plan of Treatment: You were noted to have pancytopenia with low hgb, wbc, and plts; likely chronic condition, there was Not acute bleeding, or infection concern at this time .      Diagnosis: Diabetes mellitus  Assessment and Plan of Treatment: Maintaining blood glucose level within normal range.  - You have a history of diabetes  - You should continue to take your medication regimen regularly as prescribed  - Please follow up with your primary care provider/endocrinologist within a week of discharge.  - You need to continue monitoring your blood sugar levels closely.  - Please maintain healthy lifestyle by eating healthy diabetic regimen, weight loss and exercise regularly as tolerated.  Make sure you get your HgA1c checked every three months.  It's important not to skip any meals.  Keep a log of your blood glucose results and always take it with you to your doctor appointments.  Keep a list of your current medications including injectables and over the counter medications and bring this medication list with you to all your doctor appointments.  If you have not seen your ophthalmologist this year call for appointment.  Check your feet daily for redness, sores, or openings. Do not self treat. If no improvement in two days call your primary care physician for an appointment.  Low blood sugar (hypoglycemia) is a blood sugar below 70mg/dl. Check your blood sugar if you feel signs/symptoms of hypoglycemia. If your blood sugar is below 70 take 15 grams of carbohydrates (ex 4 oz of apple juice, 3-4 glucose tablets, or 4-6 oz of regular soda) wait 15 minutes and repeat blood sugar to make sure it comes up above 70.  If your blood sugar is above 70 and you are due for a meal, have a meal.  If you are not due for a meal have a snack.  This snack helps keeps your blood sugar at a safe range.      Diagnosis: Encounter for general psychiatric examination, requested by authority  Assessment and Plan of Treatment: You were assessed by psychaitry for capacity, and you do have the capacity to refuse haemodialysis.    Diagnosis: Metabolic acidosis  Assessment and Plan of Treatment: You missed dialysis for about 1 week. You were admitted for non emergent HD inpatient .  You were seen by psychaitry and You were found to have the capacity to refuse HD, You agreed for haemodialysis in the hospital but did not  complete it .You were seen by nephrology . You are to  be discharged to nursing home with onsite dialysis.    Diagnosis: HLD (hyperlipidemia)  Assessment and Plan of Treatment: You had past history of HLD (hyperlipidemia).  Continue with home medication and follow up with your PCP

## 2021-01-29 NOTE — BEHAVIORAL HEALTH ASSESSMENT NOTE - NSBHCHARTREVIEWVS_PSY_A_CORE FT
Vital Signs Last 24 Hrs  T(C): 36.7 (29 Jan 2021 08:57), Max: 36.7 (29 Jan 2021 08:57)  T(F): 98 (29 Jan 2021 08:57), Max: 98 (29 Jan 2021 08:57)  HR: 88 (29 Jan 2021 08:57) (82 - 88)  BP: 131/73 (29 Jan 2021 08:57) (131/73 - 153/79)  BP(mean): --  RR: 18 (29 Jan 2021 08:57) (18 - 18)  SpO2: 98% (29 Jan 2021 08:57) (97% - 98%)

## 2021-01-29 NOTE — PATIENT PROFILE ADULT - VISION (WITH CORRECTIVE LENSES IF THE PATIENT USUALLY WEARS THEM):
Patient says that he cannot see/Severely impaired: cannot locate objects without hearing or touching them or patient nonresponsive.

## 2021-01-29 NOTE — H&P ADULT - NSHPPHYSICALEXAM_GEN_ALL_CORE
Vital Signs Last 24 Hrs  T(C): 36.4 (28 Jan 2021 23:58), Max: 36.4 (28 Jan 2021 17:56)  T(F): 97.6 (28 Jan 2021 23:58), Max: 97.6 (28 Jan 2021 23:58)  HR: 85 (28 Jan 2021 23:58) (82 - 85)  BP: 135/79 (28 Jan 2021 23:58) (135/79 - 143/71)  BP(mean): --  RR: 18 (28 Jan 2021 23:58) (18 - 18)  SpO2: 97% (28 Jan 2021 23:58) (97% - 98%)      GENERAL: NAD, lying in bed comfortably  HEAD:  Atraumatic, Normocephalic  EYES: EOMI, PERRLA, conjunctiva and sclera clear  ENT: Moist mucous membranes  NECK: Supple, No JVD  CHEST/LUNG: Clear to auscultation bilaterally; No rales, rhonchi, wheezing, or rubs.  HEART: Regular rate and rhythm; S1+ S2+  ABDOMEN: Bowel sounds present; Soft, Nontender, Nondistended  EXTREMITIES:  2+ Peripheral Pulses, brisk capillary refill. No clubbing, cyanosis, or edema  NERVOUS SYSTEM:  sleeping but easily arousable from sleep, refuses to communicate however, responds to some yes  no questions   MSK: L AKA   SKIN: No rashes or lesions

## 2021-01-29 NOTE — BEHAVIORAL HEALTH ASSESSMENT NOTE - SECONDARY DX2
Cocaine use disorder, moderate, in sustained remission Encounter for general psychiatric examination, requested by authority

## 2021-01-29 NOTE — H&P ADULT - HISTORY OF PRESENT ILLNESS
61 y/o M pt with a PMHx of ESRD on HD, CAD, DM, HLD, HTN, sent to ED from [facility] by Dr. De La Torre since patient missed dialysis for about 1 week. Patient refused to have dialysis or to be sent to the hospital, therefore Dr. De La Torre stated they signed a 2 physician consent for treatment. Patient denies history but he is a limited historian due to being uncooperative with most of the history. Pt denies any pain or discomfort and just wants to sleep

## 2021-01-29 NOTE — H&P ADULT - ASSESSMENT
61 y/o M pt with a PMHx of ESRD on HD, CAD, DM, HLD, sent to ED from [facility] by Dr. De La Torre since patient missed dialysis for about 1 week. Patient refused to have dialysis or to be sent to the hospital, therefore Dr. De La Torre stated they signed a 2 physician consent for treatment. Pt is admitted for non emergent HD inpatient

## 2021-01-29 NOTE — H&P ADULT - ATTENDING COMMENTS
PATIENT SEEN AND EXAMINED. CASE D/W ER MD AND RESIDENT TEAM. ABOVE ROS/VS/PE REVIEWED AND VERIFIED.     SHAKIR MAC MD COVERING KUSH MAC MD

## 2021-01-29 NOTE — BEHAVIORAL HEALTH ASSESSMENT NOTE - NSBHSOCIALHXDETAILSFT_PSY_A_CORE
B/R in [facility]. Had 5 siblings (4 sisters, 1 brother) but 3 sisters now  (2 from HIV, 1 from asthma). Was homeless for 10 yrs i/s/o crack cocaine use but stopped using around 2017, now in assisted living. Has 2 sons but has very limited contact (1 lives in [place], 1 in [place]). Brother Georgi King is his HCP.

## 2021-01-29 NOTE — H&P ADULT - PROBLEM SELECTOR PLAN 1
Pt with ESRD on HD, refusing sessions for 1 weeks   Pt has no understanding of disease   currently has no signs of fluid overload or hyperkalemia but noted to have acidosis with severely elevated BUN/cr   Will send ammonia levels   Will need dialysis in am   ED unable to reach Nephro overnight  Consulted Dr. Myers Pt with ESRD on HD, refusing sessions for 1 weeks   Pt has no understanding of disease   currently has no signs of fluid overload or hyperkalemia but noted to have acidosis with severely elevated BUN/cr   Will send ammonia levels with am labs   Will need dialysis in am   Consulted Dr. Mosher in am Pt with ESRD on HD, refusing sessions for 1 weeks   Pt has no understanding of disease   currently has no signs of fluid overload or hyperkalemia but noted to have acidosis with severely elevated BUN/cr   Will send ammonia levels with am labs   Will need dialysis in am   Will consult Dr. Barrow from palliative care   Consulted Dr. Mosher in am

## 2021-01-29 NOTE — H&P ADULT - PROBLEM SELECTOR PLAN 6
IMPROVE VTE Individual Risk Assessment  RISK                                                         Points  [  ] Previous VTE                                      3  [  ] Thrombophilia                                   2  [  ] Lower limb paralysis                         2 (unable to hold up >15 seconds)    [  ] Current Cancer                                  2       (within 6 months)  [  ] Immobilization > 24 hrs                    1  [  ] ICU/CCU stay > 24 hrs                         1  [  ] Age > 60                                              1  will start on heparin for dvt ppx

## 2021-01-29 NOTE — BEHAVIORAL HEALTH ASSESSMENT NOTE - NSBHCHARTREVIEWLAB_PSY_A_CORE FT
8.9    3.76  )-----------( 130      ( 28 Jan 2021 22:50 )             27.6   01-28    132<L>  |  98  |  97<H>  ----------------------------<  201<H>  4.1   |  13<L>  |  25.00<H>    Ca    7.7<L>      28 Jan 2021 22:50  Phos  3.2     01-28  Mg     3.4     01-28    TPro  6.3  /  Alb  2.9<L>  /  TBili  0.4  /  DBili  x   /  AST  <3<L>  /  ALT  8<L>  /  AlkPhos  156<H>  01-28

## 2021-01-29 NOTE — BEHAVIORAL HEALTH ASSESSMENT NOTE - HPI (INCLUDE ILLNESS QUALITY, SEVERITY, DURATION, TIMING, CONTEXT, MODIFYING FACTORS, ASSOCIATED SIGNS AND SYMPTOMS)
60M, unemployed on SSI and living in assisted living facility in [place], with PHx of cocaine use DO in remission, known to writer from capacity consults in 8/2019 and 12/2020 during prior admissions, and MHx of DM on insulin, ESRD on HD (TTS via LUE AVF), PVD s/p L BKA, and L eye vision loss, BIBEMS on 1/28 for missing 1 week of scheduled HD sessions and found to have severely elevated BUN/Cr (97/25) and metabolic acidosis. Psych consult was requested for capacity eval due to pt's recent refusal of HD and primary team recommendation that he relocate from [facility] to NH with onsite dialysis to improve his compliance with sessions. Pt was seen in his room on 4S, lying in bed awake but drowsy. Pt has lost a great deal of weight since prior encounter with MD, and he appears medically quite ill. Pt describes mood as "Hateful toward the entire world," which he attributes to hating dialysis. When asked about recent refusal of HD, pt says he is not refusing, but, "They told me they don't want me to have dialysis at the center." When MD asks if pt thinks this may be due to difficult interactions between pt and staff, he says, "I don't know anything about that." When MD asks whether pt would be willing to move to a nursing home, he says no: "I want to keep living where I am and get my dialysis at the center." When asked what he thinks will happen if he continues to miss HD sessions, pt says, "I would die. I know that. I'm dying anyway, so what the hell." MD offers to prescribe medication for depression, but pt declines, saying he is not depressed. MD also offers to recommend he be given Ativan pre-HD to facilitate tolerance of the sessions, but pt also declines.

## 2021-01-29 NOTE — BEHAVIORAL HEALTH ASSESSMENT NOTE - DIFFERENTIAL
Delirium 2/2 another medical condition  Adjustment DO with depressed mood  Cocaine use DO in sustained remission  Encounter for general psychiatric examination, requested by authority Adjustment DO with depressed mood  Cocaine use DO in sustained remission  Encounter for general psychiatric examination, requested by authority

## 2021-01-29 NOTE — H&P ADULT - PROBLEM SELECTOR PLAN 2
Pt noted to have severe acidosis with AG   likely in the setting of RTA vs uremic acidosis   will start on bicarb tabs  Pt needs HD in am Pt noted to have severe acidosis with AG   likely in the setting of RTA vs uremic acidosis   will start on bicarb tabs 650 q8h   Pt needs HD in am  Dr. Mosher notified

## 2021-01-30 LAB
DSDNA AB SER-ACNC: <12 IU/ML — SIGNIFICANT CHANGE UP
FERRITIN SERPL-MCNC: 903 NG/ML — HIGH (ref 30–400)
FOLATE SERPL-MCNC: 15.8 NG/ML — SIGNIFICANT CHANGE UP
GLUCOSE BLDC GLUCOMTR-MCNC: 100 MG/DL — HIGH (ref 70–99)
GLUCOSE BLDC GLUCOMTR-MCNC: 118 MG/DL — HIGH (ref 70–99)
GLUCOSE BLDC GLUCOMTR-MCNC: 126 MG/DL — HIGH (ref 70–99)
GLUCOSE BLDC GLUCOMTR-MCNC: 172 MG/DL — HIGH (ref 70–99)
GLUCOSE BLDC GLUCOMTR-MCNC: 190 MG/DL — HIGH (ref 70–99)
GLUCOSE BLDC GLUCOMTR-MCNC: 218 MG/DL — HIGH (ref 70–99)
HAPTOGLOB SERPL-MCNC: 144 MG/DL — SIGNIFICANT CHANGE UP (ref 34–200)
HBV SURFACE AG SER-ACNC: SIGNIFICANT CHANGE UP
VIT B12 SERPL-MCNC: 1218 PG/ML — SIGNIFICANT CHANGE UP (ref 232–1245)

## 2021-01-30 RX ADMIN — Medication 1: at 16:59

## 2021-01-30 NOTE — PROGRESS NOTE ADULT - PROBLEM SELECTOR PLAN 2
Pt noted to have severe acidosis with AG   likely in the setting of RTA vs uremic acidosis    on bicarb tabs 650 q8h   Pt needs HD in am  Dr. Mosher notified

## 2021-01-30 NOTE — PROGRESS NOTE ADULT - PROBLEM SELECTOR PLAN 4
Pt noted to have pancytopenia with low hgb, wbc, and plts; chronic   Not acute bleeding, or infection concern at this time   keep monitoring cbc   transfuse if hgb <7

## 2021-01-30 NOTE — PROGRESS NOTE ADULT - SUBJECTIVE AND OBJECTIVE BOX
[facility] : Progress Note :: [phone], (office [phone]),   Dr Mosher / Dr Washington / Dr Young / Dr Doll / Dr Varsha TURCIOS / Dr Tello / Dr Garcia / Dr Larry qiu  _____________________________________________________________________________________________   Cut HD yesterday.  Had two hrs of HD  Offered HD today but declined..    fish (Rash)  liver (Anaphylaxis)  No Known Drug Allergies    Hospital Medications:   MEDICATIONS  (STANDING):  epoetin beverley-epbx (RETACRIT) Injectable 67428 Unit(s) IV Push <User Schedule>  folic acid 1 milliGRAM(s) Oral daily  heparin   Injectable 5000 Unit(s) SubCutaneous every 8 hours  insulin lispro (ADMELOG) corrective regimen sliding scale   SubCutaneous Before meals and at bedtime  nortriptyline 10 milliGRAM(s) Oral daily  pantoprazole    Tablet 40 milliGRAM(s) Oral before breakfast  petrolatum white Ointment 1 Application(s) Topical daily  sevelamer carbonate 1600 milliGRAM(s) Oral three times a day with meals  sevelamer carbonate 800 milliGRAM(s) Oral <User Schedule>  simvastatin 40 milliGRAM(s) Oral at bedtime  sodium bicarbonate 650 milliGRAM(s) Oral three times a day  sucralfate suspension 1 Gram(s) Oral four times a day        VITALS:  T(F): 97.9 (01-29-21 @ 22:05), Max: 98.4 (01-29-21 @ 20:05)  HR: 98 (01-29-21 @ 22:05)  BP: 126/76 (01-29-21 @ 22:05)  RR: 17 (01-29-21 @ 22:05)  SpO2: 100% (01-29-21 @ 22:05)  Wt(kg): --    01-29 @ 07:01  -  01-30 @ 07:00  --------------------------------------------------------  IN: 400 mL / OUT: 1000 mL / NET: -600 mL        PHYSICAL EXAM:  Constitutional: NAD  HEENT: anicteric sclera, oropharynx clear.  Neck: No JVD  Respiratory: CTAB, no wheezes, rales or rhonchi  Cardiovascular: S1, S2, RRR  Gastrointestinal: BS+, soft, NT/ND  Neurological: A/O x 3, no focal deficits  Vascular Access: RUEAVF with thrill and bruit    LABS:  01-29    133<L>  |  102  |  107<H>  ----------------------------<  132<H>  4.9   |  12<L>  |  25.70<H>    Ca    7.8<L>      29 Jan 2021 20:21  Phos  4.3     01-29  Mg     3.3     01-29    TPro  6.3  /  Alb  2.8<L>  /  TBili  0.5  /  DBili      /  AST  <3<L>  /  ALT  6<L>  /  AlkPhos  148<H>  01-29    Creatinine Trend: 25.70 <--, 25.00 <--                        8.5    3.45  )-----------( 125      ( 29 Jan 2021 20:21 )             26.0     Urine Studies:      RADIOLOGY & ADDITIONAL STUDIES:

## 2021-01-30 NOTE — PROGRESS NOTE ADULT - PROBLEM SELECTOR PLAN 1
Pt with ESRD on HD, refusing sessions for 1 weeks   Pt has no understanding of disease   currently has no signs of fluid overload or hyperkalemia but noted to have acidosis  patient was seen by psychaitry and patient has the capacity to refuse HD and No indication for standing or PRN psychotropic medications at this time.  he agreed to dialysis yesterday

## 2021-01-30 NOTE — PROGRESS NOTE ADULT - SUBJECTIVE AND OBJECTIVE BOX
PGY-1 Progress Note discussed with attending    PAGER #: [[phone]] TILL 5:00 PM  PLEASE CONTACT ON CALL TEAM:  - On Call Team (Please refer to Dejon) FROM 5:00 PM - 8:30PM  - Nightfloat Team FROM 8:30 -7:30 AM    CHIEF COMPLAINT & BRIEF HOSPITAL COURSE:9 y/o M pt with a PMHx of ESRD on HD, CAD, DM, HLD, HTN, sent to ED from [facility] by Dr. De La Torre since patient missed dialysis for about 1 week. Patient refused to have dialysis or to be sent to the hospital, therefore Dr. De La Torre stated they signed a 2 physician consent for treatment. Patient denies history but he is a limited historian due to being uncooperative with most of the history. Pt denies any pain or discomfort .Pt is admitted for non emergent HD inpatient . patient was seen by psychaitry and patient has the capacity to refuse HD,       INTERVAL HPI/OVERNIGHT EVENTS:   patient examined be dside, he is comfortable, not in distress, patient was seen by psychaitry and patient has the capacity to refuse HD and No indication for standing or PRN psychotropic medications at this time.      REVIEW OF SYSTEMS:  CONSTITUTIONAL: No fever, weight loss, or fatigue  RESPIRATORY: No cough, wheezing, chills or hemoptysis; No shortness of breath  CARDIOVASCULAR: No chest pain, palpitations, dizziness, or leg swelling  GASTROINTESTINAL: No abdominal pain. No nausea, vomiting, or hematemesis; No diarrhea or constipation. No melena or hematochezia.  GENITOURINARY: No dysuria or hematuria, urinary frequency  NEUROLOGICAL: No headaches, memory loss, loss of strength, numbness, or tremors  SKIN: No itching, burning, rashes, or lesions     MEDICATIONS  (STANDING):  epoetin beverley-epbx (RETACRIT) Injectable 66711 Unit(s) IV Push <User Schedule>  folic acid 1 milliGRAM(s) Oral daily  heparin   Injectable 5000 Unit(s) SubCutaneous every 8 hours  insulin lispro (ADMELOG) corrective regimen sliding scale   SubCutaneous Before meals and at bedtime  nortriptyline 10 milliGRAM(s) Oral daily  pantoprazole    Tablet 40 milliGRAM(s) Oral before breakfast  petrolatum white Ointment 1 Application(s) Topical daily  sevelamer carbonate 1600 milliGRAM(s) Oral three times a day with meals  sevelamer carbonate 800 milliGRAM(s) Oral <User Schedule>  simvastatin 40 milliGRAM(s) Oral at bedtime  sodium bicarbonate 650 milliGRAM(s) Oral three times a day  sucralfate suspension 1 Gram(s) Oral four times a day    MEDICATIONS  (PRN):  ondansetron    Tablet 4 milliGRAM(s) Oral every 6 hours PRN for nausea      Vital Signs Last 24 Hrs  T(C): 36.6 (29 Jan 2021 22:05), Max: 36.9 (29 Jan 2021 20:05)  T(F): 97.9 (29 Jan 2021 22:05), Max: 98.4 (29 Jan 2021 20:05)  HR: 98 (29 Jan 2021 22:05) (88 - 98)  BP: 126/76 (29 Jan 2021 22:05) (126/76 - 159/89)  BP(mean): --  RR: 17 (29 Jan 2021 22:05) (17 - 19)  SpO2: 100% (29 Jan 2021 22:05) (98% - 100%)    PHYSICAL EXAMINATION:  GENERAL: NAD, blind   HEAD:  Atraumatic, Normocephalic  EYES:  conjunctiva and sclera clear, blind  NECK: Supple, No JVD, Normal thyroid  CHEST/LUNG: Clear to auscultation. Clear to percussion bilaterally; No rales, rhonchi, wheezing, or rubs  HEART: Regular rate and rhythm; No murmurs, rubs, or gallops  ABDOMEN: Soft, Nontender, Nondistended; Bowel sounds present  NERVOUS SYSTEM:  Alert & Oriented X3,    EXTREMITIES:  2+ Peripheral Pulses,  L BKA,  SKIN: warm dry                          8.5    3.45  )-----------( 125      ( 29 Jan 2021 20:21 )             26.0     01-29    133<L>  |  102  |  107<H>  ----------------------------<  132<H>  4.9   |  12<L>  |  25.70<H>    Ca    7.8<L>      29 Jan 2021 20:21  Phos  4.3     01-29  Mg     3.3     01-29    TPro  6.3  /  Alb  2.8<L>  /  TBili  0.5  /  DBili  x   /  AST  <3<L>  /  ALT  6<L>  /  AlkPhos  148<H>  01-29    LIVER FUNCTIONS - ( 29 Jan 2021 20:21 )  Alb: 2.8 g/dL / Pro: 6.3 g/dL / ALK PHOS: 148 U/L / ALT: 6 U/L DA / AST: <3 U/L / GGT: x           CARDIAC MARKERS ( 28 Jan 2021 22:50 )  <0.015 ng/mL / x     / x     / x     / x                  CAPILLARY BLOOD GLUCOSE  CAPILLARY BLOOD GLUCOSE      POCT Blood Glucose.: 118 mg/dL (30 Jan 2021 08:20)    CAPILLARY BLOOD GLUCOSE      POCT Blood Glucose.: 118 mg/dL (30 Jan 2021 08:20)  POCT Blood Glucose.: 103 mg/dL (29 Jan 2021 22:33)      RADIOLOGY & ADDITIONAL TESTS:

## 2021-01-30 NOTE — PROGRESS NOTE ADULT - SUBJECTIVE AND OBJECTIVE BOX
Patient is a 60y old  Male who presents with a chief complaint of refusing HD (30 Jan 2021 17:19)    PATIENT IS SEEN AND EXAMINED IN MEDICAL FLOOR.      ALLERGIES:  fish (Rash)  liver (Anaphylaxis)  No Known Drug Allergies       VITALS:    Vital Signs Last 24 Hrs  T(C): --  T(F): --  HR: --  BP: --  BP(mean): --  RR: --  SpO2: --    LABS:    CBC Full  -  ( 29 Jan 2021 20:21 )  WBC Count : 3.45 K/uL  RBC Count : 3.00 M/uL  Hemoglobin : 8.5 g/dL  Hematocrit : 26.0 %  Platelet Count - Automated : 125 K/uL  Mean Cell Volume : 86.7 fl  Mean Cell Hemoglobin : 28.3 pg  Mean Cell Hemoglobin Concentration : 32.7 gm/dL  Auto Neutrophil # : x  Auto Lymphocyte # : x  Auto Monocyte # : x  Auto Eosinophil # : x  Auto Basophil # : x  Auto Neutrophil % : x  Auto Lymphocyte % : x  Auto Monocyte % : x  Auto Eosinophil % : x  Auto Basophil % : x      01-29    133<L>  |  102  |  107<H>  ----------------------------<  132<H>  4.9   |  12<L>  |  25.70<H>    Ca    7.8<L>      29 Jan 2021 20:21  Phos  4.3     01-29  Mg     3.3     01-29    TPro  6.3  /  Alb  2.8<L>  /  TBili  0.5  /  DBili  x   /  AST  <3<L>  /  ALT  6<L>  /  AlkPhos  148<H>  01-29    CAPILLARY BLOOD GLUCOSE      POCT Blood Glucose.: 190 mg/dL (30 Jan 2021 21:39)  POCT Blood Glucose.: 172 mg/dL (30 Jan 2021 16:53)  POCT Blood Glucose.: 126 mg/dL (30 Jan 2021 12:30)  POCT Blood Glucose.: 118 mg/dL (30 Jan 2021 08:20)  POCT Blood Glucose.: 103 mg/dL (29 Jan 2021 22:33)    CARDIAC MARKERS ( 28 Jan 2021 22:50 )  <0.015 ng/mL / x     / x     / x     / x          LIVER FUNCTIONS - ( 29 Jan 2021 20:21 )  Alb: 2.8 g/dL / Pro: 6.3 g/dL / ALK PHOS: 148 U/L / ALT: 6 U/L DA / AST: <3 U/L / GGT: x           Creatinine Trend: 25.70<--, 25.00<--, 11.90<--, 17.50<--, 16.30<--, 13.50<--  I&O's Summary    29 Jan 2021 07:01  -  30 Jan 2021 07:00  --------------------------------------------------------  IN: 400 mL / OUT: 1000 mL / NET: -600 mL            .Stool Feces  01-14 @ 02:28   No enteric pathogens isolated.  (Stool culture examined for Salmonella,  Shigella, Campylobacter, Aeromonas, Plesiomonas,  Vibrio, E.coli O157 and Yersinia)  --  --      .Blood Blood-Peripheral  01-13 @ 07:02   No Growth Final  --  --      .Blood Blood  12-29 @ 02:59   No Growth Final  --  --          MEDICATIONS:    MEDICATIONS  (STANDING):  epoetin beverley-epbx (RETACRIT) Injectable 40918 Unit(s) IV Push <User Schedule>  folic acid 1 milliGRAM(s) Oral daily  heparin   Injectable 5000 Unit(s) SubCutaneous every 8 hours  insulin lispro (ADMELOG) corrective regimen sliding scale   SubCutaneous Before meals and at bedtime  nortriptyline 10 milliGRAM(s) Oral daily  pantoprazole    Tablet 40 milliGRAM(s) Oral before breakfast  petrolatum white Ointment 1 Application(s) Topical daily  sevelamer carbonate 1600 milliGRAM(s) Oral three times a day with meals  sevelamer carbonate 800 milliGRAM(s) Oral <User Schedule>  simvastatin 40 milliGRAM(s) Oral at bedtime  sodium bicarbonate 650 milliGRAM(s) Oral three times a day  sucralfate suspension 1 Gram(s) Oral four times a day      MEDICATIONS  (PRN):  ondansetron    Tablet 4 milliGRAM(s) Oral every 6 hours PRN for nausea        REVIEW OF SYSTEMS:                           ALL ROS DONE [ X   ]      CONSTITUTIONAL:  LETHARGIC [   ], FEVER [   ], UNRESPONSIVE [   ]  CVS:  CP  [   ], SOB, [   ], PALPITATIONS [   ], DIZZYNESS [   ]  RS: COUGH [   ], SPUTUM [   ]  GI: ABDOMINAL PAIN [   ], NAUSEA [   ], VOMITINGS [   ], DIARRHEA [   ], CONSTIPATION [   ]  :  DYSURIA [   ], NOCTURIA [   ], INCREASED FREQUENCY [   ], DRIBLING [   ],  SKELETAL: PAINFUL JOINTS [   ], SWOLLEN JOINTS [   ], NECK ACHE [   ], LOW BACK ACHE [   ],  SKIN : ULCERS [   ], RASH [   ], ITCHING [   ]  CNS: HEAD ACHE [   ], DOUBLE VISION [   ], BLURRED VISION [   ], AMS / CONFUSION [   ], SEIZURES [   ], WEAKNESS [   ],TINGLING / NUMBNESS [   ]      PHYSICAL EXAMINATION:    GENERAL APPEARANCE: NO DISTRESS  HEENT:  NO PALLOR, NO  JVD,  NO   NODES, NECK SUPPLE  CVS: S1 +, S2 +,   RS: AEEB,  OCCASIONAL  RALES +,   NO RONCHI  ABD: SOFT, NT, NO, BS +  EXT: NO PE  SKIN: WARM,   SKELETAL:  ROM ACCEPTABLE  CNS:  AAO X  2  ,   DEFICITS        RADIOLOGY :          ASSESSMENT :     Acute renal failure    ESRD on hemodialysis    Vision loss of left eye    Osteoporosis    Osteoarthritis    Contracture of hand    Diabetic neuropathy    Diabetes mellitus    Hyperparathyroidism    Spinal stenosis of lumbosacral region    Peripheral vascular disease    HLD (hyperlipidemia)    Coronary artery disease    Glaucoma    Anemia    CKD (chronic kidney disease)    Adrenal insufficiency    Hypertension      S/P arteriovenous (AV) fistula creation    History of left cataract extraction    History of right cataract extraction    Below knee amputation status, left          PLAN:  HPI:  61 y/o M pt with a PMHx of ESRD on HD, CAD, DM, HLD, HTN, sent to ED from [facility] by Dr. De La Torre since patient missed dialysis for about 1 week. Patient refused to have dialysis or to be sent to the hospital, therefore Dr. De La Torre stated they signed a 2 physician consent for treatment. Patient denies history but he is a limited historian due to being uncooperative with most of the history. Pt denies any pain or discomfort and just wants to sleep  (29 Jan 2021 02:26)    -  MISSED HD , ESRD ON HD -  HD IS RESTARTED   -  ANEMIA OF CKD   -  GASTROPARESIS , RECURRENT VOMITING, FAILURE TO THRIVE, SEVERE PROTEIN CALORIE MALNUTRITION - WILL ADVISE TO CONTINUE REGLAN, PROTONIX, SUPPLEMENTS  -  LEGALLY BLIND  -  DVT PROPHYLAXIS  - DR. COLIN DE LA TORRE

## 2021-01-31 LAB
GLUCOSE BLDC GLUCOMTR-MCNC: 171 MG/DL — HIGH (ref 70–99)
GLUCOSE BLDC GLUCOMTR-MCNC: 57 MG/DL — LOW (ref 70–99)

## 2021-01-31 RX ORDER — LOPERAMIDE HCL 2 MG
2 TABLET ORAL THREE TIMES A DAY
Refills: 0 | Status: COMPLETED | OUTPATIENT
Start: 2021-01-31 | End: 2021-02-01

## 2021-01-31 RX ORDER — ACETAMINOPHEN 500 MG
650 TABLET ORAL EVERY 6 HOURS
Refills: 0 | Status: DISCONTINUED | OUTPATIENT
Start: 2021-01-31 | End: 2021-02-02

## 2021-01-31 RX ADMIN — Medication 1: at 12:51

## 2021-01-31 RX ADMIN — Medication 1 GRAM(S): at 12:55

## 2021-01-31 RX ADMIN — Medication 1 APPLICATION(S): at 12:59

## 2021-01-31 RX ADMIN — Medication 1 GRAM(S): at 17:40

## 2021-01-31 RX ADMIN — NORTRIPTYLINE HYDROCHLORIDE 10 MILLIGRAM(S): 10 CAPSULE ORAL at 12:54

## 2021-01-31 RX ADMIN — Medication 2 MILLIGRAM(S): at 12:53

## 2021-01-31 RX ADMIN — Medication 1 MILLIGRAM(S): at 12:54

## 2021-01-31 NOTE — PROGRESS NOTE ADULT - SUBJECTIVE AND OBJECTIVE BOX
Patient is a 60y old  Male who presents with a chief complaint of refusing HD (30 Jan 2021 22:18)    PATIENT IS SEEN AND EXAMINED IN MEDICAL FLOOR.    ALLERGIES:  fish (Rash)  liver (Anaphylaxis)  No Known Drug Allergies        VITALS:    Vital Signs Last 24 Hrs  T(C): 36.7 (31 Jan 2021 09:05), Max: 36.7 (31 Jan 2021 09:05)  T(F): 98 (31 Jan 2021 09:05), Max: 98 (31 Jan 2021 09:05)  HR: 89 (31 Jan 2021 09:05) (85 - 89)  BP: 130/74 (31 Jan 2021 09:05) (130/74 - 136/71)  BP(mean): --  RR: 17 (31 Jan 2021 09:05) (17 - 18)  SpO2: 100% (31 Jan 2021 09:05) (100% - 100%)      CAPILLARY BLOOD GLUCOSE    POCT Blood Glucose.: 171 mg/dL (31 Jan 2021 11:46)  POCT Blood Glucose.: 57 mg/dL (31 Jan 2021 07:57)      Creatinine Trend: 25.70<--, 25.00<--, 11.90<--, 17.50<--, 16.30<--, 13.50<--  I&O's Summary      .Stool Feces  01-14 @ 02:28   No enteric pathogens isolated.  (Stool culture examined for Salmonella,  Shigella, Campylobacter, Aeromonas, Plesiomonas,  Vibrio, E.coli O157 and Yersinia)  --  --      .Blood Blood-Peripheral  01-13 @ 07:02   No Growth Final  --  --      .Blood Blood  12-29 @ 02:59   No Growth Final  --  --          MEDICATIONS:    MEDICATIONS  (STANDING):  epoetin beverley-epbx (RETACRIT) Injectable 28310 Unit(s) IV Push <User Schedule>  folic acid 1 milliGRAM(s) Oral daily  heparin   Injectable 5000 Unit(s) SubCutaneous every 8 hours  insulin lispro (ADMELOG) corrective regimen sliding scale   SubCutaneous Before meals and at bedtime  loperamide 2 milliGRAM(s) Oral three times a day  nortriptyline 10 milliGRAM(s) Oral daily  pantoprazole    Tablet 40 milliGRAM(s) Oral before breakfast  petrolatum white Ointment 1 Application(s) Topical daily  sevelamer carbonate 1600 milliGRAM(s) Oral three times a day with meals  sevelamer carbonate 800 milliGRAM(s) Oral <User Schedule>  simvastatin 40 milliGRAM(s) Oral at bedtime  sodium bicarbonate 650 milliGRAM(s) Oral three times a day  sucralfate suspension 1 Gram(s) Oral four times a day      MEDICATIONS  (PRN):  acetaminophen   Tablet .. 650 milliGRAM(s) Oral every 6 hours PRN Mild Pain (1 - 3)  ondansetron    Tablet 4 milliGRAM(s) Oral every 6 hours PRN for nausea        REVIEW OF SYSTEMS:                           ALL ROS DONE [ X   ]      CONSTITUTIONAL:  LETHARGIC [   ], FEVER [   ], UNRESPONSIVE [   ]  CVS:  CP  [   ], SOB, [   ], PALPITATIONS [   ], DIZZYNESS [   ]  RS: COUGH [   ], SPUTUM [   ]  GI: ABDOMINAL PAIN [   ], NAUSEA [   ], VOMITINGS [   ], DIARRHEA [   ], CONSTIPATION [   ]  :  DYSURIA [   ], NOCTURIA [   ], INCREASED FREQUENCY [   ], DRIBLING [   ],  SKELETAL: PAINFUL JOINTS [   ], SWOLLEN JOINTS [   ], NECK ACHE [   ], LOW BACK ACHE [   ],  SKIN : ULCERS [   ], RASH [   ], ITCHING [   ]  CNS: HEAD ACHE [   ], DOUBLE VISION [   ], BLURRED VISION [   ], AMS / CONFUSION [   ], SEIZURES [   ], WEAKNESS [   ],TINGLING / NUMBNESS [   ]      PHYSICAL EXAMINATION:    GENERAL APPEARANCE: NO DISTRESS  HEENT:  NO PALLOR, NO  JVD,  NO   NODES, NECK SUPPLE  CVS: S1 +, S2 +,   RS: AEEB,  OCCASIONAL  RALES +,   NO RONCHI  ABD: SOFT, NT, NO, BS +  EXT: NO PE  SKIN: WARM,   SKELETAL:  ROM ACCEPTABLE  CNS:  AAO X  2  ,   DEFICITS        RADIOLOGY :          ASSESSMENT :     Acute renal failure    ESRD on hemodialysis    Vision loss of left eye    Osteoporosis    Osteoarthritis    Contracture of hand    Diabetic neuropathy    Diabetes mellitus    Hyperparathyroidism    Spinal stenosis of lumbosacral region    Peripheral vascular disease    HLD (hyperlipidemia)    Coronary artery disease    Glaucoma    Anemia    CKD (chronic kidney disease)    Adrenal insufficiency    Hypertension      S/P arteriovenous (AV) fistula creation    History of left cataract extraction    History of right cataract extraction    Below knee amputation status, left          PLAN:  HPI:  61 y/o M pt with a PMHx of ESRD on HD, CAD, DM, HLD, HTN, sent to ED from [facility] by Dr. De La Torre since patient missed dialysis for about 1 week. Patient refused to have dialysis or to be sent to the hospital, therefore Dr. De La Torre stated they signed a 2 physician consent for treatment. Patient denies history but he is a limited historian due to being uncooperative with most of the history. Pt denies any pain or discomfort and just wants to sleep  (29 Jan 2021 02:26)    -  PATIENT IS STABLE FOR DISCHARGE IN AM  -  MISSED HD , ESRD ON HD -  HD IS RESTARTED  - STABLE AT THIS TIME - SW TO REESTABLISH HIS HD SCHEDULE AND DISCHARGE BACK TO [facility]  -  ANEMIA OF CKD   -  GASTROPARESIS , RECURRENT VOMITING, FAILURE TO THRIVE, SEVERE PROTEIN CALORIE MALNUTRITION - WILL ADVISE TO CONTINUE REGLAN, PROTONIX, SUPPLEMENTS  -  LEGALLY BLIND  -  DVT PROPHYLAXIS    - DR. COLIN DE LA TORRE

## 2021-02-01 DIAGNOSIS — R45.89 OTHER SYMPTOMS AND SIGNS INVOLVING EMOTIONAL STATE: ICD-10-CM

## 2021-02-01 DIAGNOSIS — Z71.89 OTHER SPECIFIED COUNSELING: ICD-10-CM

## 2021-02-01 DIAGNOSIS — Z51.5 ENCOUNTER FOR PALLIATIVE CARE: ICD-10-CM

## 2021-02-01 DIAGNOSIS — R53.81 OTHER MALAISE: ICD-10-CM

## 2021-02-01 LAB
ALBUMIN SERPL ELPH-MCNC: 2.8 G/DL — LOW (ref 3.5–5)
ALP SERPL-CCNC: 131 U/L — HIGH (ref 40–120)
ALT FLD-CCNC: 9 U/L DA — LOW (ref 10–60)
ANION GAP SERPL CALC-SCNC: 13 MMOL/L — SIGNIFICANT CHANGE UP (ref 5–17)
AST SERPL-CCNC: <3 U/L — LOW (ref 10–40)
BILIRUB SERPL-MCNC: 0.4 MG/DL — SIGNIFICANT CHANGE UP (ref 0.2–1.2)
BUN SERPL-MCNC: 76 MG/DL — HIGH (ref 7–18)
CALCIUM SERPL-MCNC: 8.2 MG/DL — LOW (ref 8.4–10.5)
CHLORIDE SERPL-SCNC: 107 MMOL/L — SIGNIFICANT CHANGE UP (ref 96–108)
CO2 SERPL-SCNC: 15 MMOL/L — LOW (ref 22–31)
CREAT SERPL-MCNC: 19.6 MG/DL — HIGH (ref 0.5–1.3)
GLUCOSE BLDC GLUCOMTR-MCNC: 126 MG/DL — HIGH (ref 70–99)
GLUCOSE BLDC GLUCOMTR-MCNC: 155 MG/DL — HIGH (ref 70–99)
GLUCOSE BLDC GLUCOMTR-MCNC: 207 MG/DL — HIGH (ref 70–99)
GLUCOSE SERPL-MCNC: 99 MG/DL — SIGNIFICANT CHANGE UP (ref 70–99)
HCT VFR BLD CALC: 25.7 % — LOW (ref 39–50)
HGB BLD-MCNC: 8.5 G/DL — LOW (ref 13–17)
MAGNESIUM SERPL-MCNC: 3.2 MG/DL — HIGH (ref 1.6–2.6)
MCHC RBC-ENTMCNC: 29 PG — SIGNIFICANT CHANGE UP (ref 27–34)
MCHC RBC-ENTMCNC: 33.1 GM/DL — SIGNIFICANT CHANGE UP (ref 32–36)
MCV RBC AUTO: 87.7 FL — SIGNIFICANT CHANGE UP (ref 80–100)
NRBC # BLD: 0 /100 WBCS — SIGNIFICANT CHANGE UP (ref 0–0)
PHOSPHATE SERPL-MCNC: 3.5 MG/DL — SIGNIFICANT CHANGE UP (ref 2.5–4.5)
PLATELET # BLD AUTO: 111 K/UL — LOW (ref 150–400)
POTASSIUM SERPL-MCNC: 4.7 MMOL/L — SIGNIFICANT CHANGE UP (ref 3.5–5.3)
POTASSIUM SERPL-SCNC: 4.7 MMOL/L — SIGNIFICANT CHANGE UP (ref 3.5–5.3)
PROT SERPL-MCNC: 6.2 G/DL — SIGNIFICANT CHANGE UP (ref 6–8.3)
RBC # BLD: 2.93 M/UL — LOW (ref 4.2–5.8)
RBC # FLD: 18.5 % — HIGH (ref 10.3–14.5)
SARS-COV-2 RNA SPEC QL NAA+PROBE: SIGNIFICANT CHANGE UP
SODIUM SERPL-SCNC: 135 MMOL/L — SIGNIFICANT CHANGE UP (ref 135–145)
WBC # BLD: 2.98 K/UL — LOW (ref 3.8–10.5)
WBC # FLD AUTO: 2.98 K/UL — LOW (ref 3.8–10.5)

## 2021-02-01 PROCEDURE — 99223 1ST HOSP IP/OBS HIGH 75: CPT

## 2021-02-01 PROCEDURE — 99497 ADVNCD CARE PLAN 30 MIN: CPT | Mod: 25

## 2021-02-01 RX ORDER — ACETAMINOPHEN 500 MG
1 TABLET ORAL
Qty: 0 | Refills: 0 | DISCHARGE

## 2021-02-01 RX ORDER — ACETAMINOPHEN 500 MG
1000 TABLET ORAL ONCE
Refills: 0 | Status: COMPLETED | OUTPATIENT
Start: 2021-02-01 | End: 2021-02-01

## 2021-02-01 RX ORDER — MIRTAZAPINE 45 MG/1
7.5 TABLET, ORALLY DISINTEGRATING ORAL AT BEDTIME
Refills: 0 | Status: DISCONTINUED | OUTPATIENT
Start: 2021-02-02 | End: 2021-02-02

## 2021-02-01 RX ORDER — GLUCAGON INJECTION, SOLUTION 0.5 MG/.1ML
0 INJECTION, SOLUTION SUBCUTANEOUS
Qty: 0 | Refills: 0 | DISCHARGE

## 2021-02-01 RX ADMIN — ERYTHROPOIETIN 10000 UNIT(S): 10000 INJECTION, SOLUTION INTRAVENOUS; SUBCUTANEOUS at 09:55

## 2021-02-01 RX ADMIN — Medication 1 GRAM(S): at 17:09

## 2021-02-01 RX ADMIN — Medication 650 MILLIGRAM(S): at 17:07

## 2021-02-01 RX ADMIN — Medication 400 MILLIGRAM(S): at 19:40

## 2021-02-01 RX ADMIN — Medication 1: at 08:04

## 2021-02-01 RX ADMIN — Medication 1 GRAM(S): at 12:58

## 2021-02-01 RX ADMIN — Medication 2: at 21:51

## 2021-02-01 NOTE — CONSULT NOTE ADULT - PROBLEM SELECTOR RECOMMENDATION 3
-per psych notes, patient's brother Georgi King [phone] has been involved in his care before and is HCP  -patient has two sons who are out of state and not involved with patient per psych notes  -per psych, patient currently with decision making capacity regarding HD   -patient currently full code based on palliative provider conversation -patient has been agreeable to HD, although sometimes not the entire session, while hospitalized   -patient states he wishes to return to [facility] and outpatient HD center

## 2021-02-01 NOTE — CONSULT NOTE ADULT - PROBLEM SELECTOR RECOMMENDATION 5
-management as above  -please page for any acute symptoms   Sumemr Carbajal MD   Hospice and Palliative Medicine Fellow

## 2021-02-01 NOTE — CONSULT NOTE ADULT - PROBLEM SELECTOR RECOMMENDATION 2
-patient has been agreeable to HD, although sometimes not the entire session, while hospitalized   -patient states he wishes to return to [facility] and outpatient HD center -PPSV2 60%   -patient legally blind   -continue assistance with ADLs -PPSV2 50%   -patient legally blind   -continue assistance with ADLs

## 2021-02-01 NOTE — PROGRESS NOTE ADULT - ATTENDING COMMENTS
PATIENT SEEN AND EXAMINED. CASE D/W FLOOR TEAM. ABOVE ROS/VS/PE REVIEWED AND VERIFIED.     HPI:  61 y/o M pt with a PMHx of ESRD on HD, CAD, DM, HLD, HTN, sent to ED from [facility] by Dr. De La Torre since patient missed dialysis for about 1 week. Patient refused to have dialysis or to be sent to the hospital, therefore Dr. De La Torre stated they signed a 2 physician consent for treatment. Patient denies history but he is a limited historian due to being uncooperative with most of the history. Pt denies any pain or discomfort and just wants to sleep  (29 Jan 2021 02:26)    -  PATIENT IS STABLE FOR DISCHARGE IN AM  -  MISSED HD , ESRD ON HD -  HD IS RESTARTED  - STABLE AT THIS TIME - SW TO REESTABLISH HIS HD SCHEDULE AND DISCHARGE BACK TO [facility] FACILITY  -  ANEMIA OF CKD   -  GASTROPARESIS , RECURRENT VOMITING, FAILURE TO THRIVE, SEVERE PROTEIN CALORIE MALNUTRITION - WILL ADVISE TO CONTINUE REGLAN, PROTONIX, SUPPLEMENTS   -  LEGALLY BLIND  -  DVT PROPHYLAXIS    SHAKIR DE LA TORRE MD COVERING KUSH DE LA TORRE MD

## 2021-02-01 NOTE — CONSULT NOTE ADULT - SUBJECTIVE AND OBJECTIVE BOX
[facility] : Progress Note :: [phone], (office [phone]),   Dr Mosher / Dr Washington / Dr Young / Dr Doll / Dr Varsha TURCIOS / Dr Tello / Dr Garcia / Dr Larry qiu  _____________________________________________________________________________________________  Patient is a 60y Male whom presented to the hospital with refusal of dialysis for a week.  He has ESRD on HD at [facility]. However, has been refusing dialysis for  a week and cuts treatment time when he does come to HD , thus was transferred  from assisted living  to ED.  Noted to have severe metabolic acidosis.  Renal consulted for ESRD     PAST MEDICAL & SURGICAL HISTORY:  ESRD on hemodialysis    Vision loss of left eye    Osteoporosis    Osteoarthritis    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    Diabetic neuropathy    Diabetes mellitus    Hyperparathyroidism    Spinal stenosis of lumbosacral region    Peripheral vascular disease    HLD (hyperlipidemia)    Coronary artery disease    Glaucoma    Anemia    CKD (chronic kidney disease)    Adrenal insufficiency    Hypertension    S/P arteriovenous (AV) fistula creation  2018    History of left cataract extraction    History of right cataract extraction    Below knee amputation status, left      fish (Rash)  liver (Anaphylaxis)  No Known Drug Allergies    Home Medications Reviewed  Hospital Medications:   MEDICATIONS  (STANDING):  epoetin beverley-epbx (RETACRIT) Injectable 79257 Unit(s) IV Push <User Schedule>  folic acid 1 milliGRAM(s) Oral daily  heparin   Injectable 5000 Unit(s) SubCutaneous every 8 hours  insulin lispro (ADMELOG) corrective regimen sliding scale   SubCutaneous Before meals and at bedtime  nortriptyline 10 milliGRAM(s) Oral daily  pantoprazole    Tablet 40 milliGRAM(s) Oral before breakfast  petrolatum white Ointment 1 Application(s) Topical daily  sevelamer carbonate 1600 milliGRAM(s) Oral three times a day with meals  sevelamer carbonate 800 milliGRAM(s) Oral <User Schedule>  simvastatin 40 milliGRAM(s) Oral at bedtime  sodium bicarbonate 650 milliGRAM(s) Oral three times a day  sucralfate suspension 1 Gram(s) Oral four times a day    SOCIAL HISTORY:  Denies ETOh,Smoking,   FAMILY HISTORY:      VITALS:  T(F): 98 (01-29-21 @ 08:57), Max: 98 (01-29-21 @ 08:57)  HR: 88 (01-29-21 @ 08:57)  BP: 131/73 (01-29-21 @ 08:57)  RR: 18 (01-29-21 @ 08:57)  SpO2: 98% (01-29-21 @ 08:57)  Wt(kg): --    Height (cm): 157.5 (01-28 @ 17:56)  Weight (kg): 62.6 (01-28 @ 17:56)  BMI (kg/m2): 25.2 (01-28 @ 17:56)  BSA (m2): 1.63 (01-28 @ 17:56)  PHYSICAL EXAM:  Constitutional: NAD  HEENT: anicteric sclera, oropharynx clear  Neck: No JVD  Respiratory: CTAB, no wheezes, rales or rhonchi  Cardiovascular: S1, S2, RRR  Gastrointestinal: BS+, soft, NT/ND  Extremities: No peripheral edema  Vascular Access: AVF with thrill  and bruit    LABS:  01-28    132<L>  |  98  |  97<H>  ----------------------------<  201<H>  4.1   |  13<L>  |  25.00<H>    Ca    7.7<L>      28 Jan 2021 22:50  Phos  3.2     01-28  Mg     3.4     01-28    TPro  6.3  /  Alb  2.9<L>  /  TBili  0.4  /  DBili      /  AST  <3<L>  /  ALT  8<L>  /  AlkPhos  156<H>  01-28    Creatinine Trend: 25.00 <--                        8.9    3.76  )-----------( 130      ( 28 Jan 2021 22:50 )             27.6     Urine Studies:      RADIOLOGY & ADDITIONAL STUDIES:                
Consult to: Discuss complex medical decision making related to goals of care    HPI:  61 y/o M pt with a PMHx of ESRD on HD, CAD, DM, HLD, HTN, sent to ED from [facility] by Dr. De La Torre since patient missed dialysis for about 1 week. Patient refused to have dialysis or to be sent to the hospital, therefore Dr. De La Torre stated they signed a 2 physician consent for treatment. Patient denies history but he is a limited historian due to being uncooperative with most of the history. Pt denies any pain or discomfort and just wants to sleep  (2021 02:26)    Patient seen by psychiatry service and felt to have capacity to refuse HD as well as to refuse placement in a facility with on-site HD. Palliative Medicine consulted for assistance with complex decision making.     Patient denied any symptoms. Endorsed not wanting to talk, and wanting to be discharged to his facility. When asked if this provider could assist him, he asked for a cup of tea. After receiving tea, patient somewhat more amenable to conversation. He states he came to the hospital "to get dialysis, I got dialysis, now I want to go back to my facility" and resume HD at his outpatient dialysis center. When patient asked if he were unable to make his decisions, if there was someone he would want to make them, he stated "I make my own decisions and I don't want you talking to anyone right now about me". With regards to code status, "if my heart stops and they can make me alive again, I want it." Patient also became extremely angry regarding his hospital meals. He stated he is a vegetarian and his meal trays were meat. He stated "if I get meat for lunch, I'm going to throw my tray. And none of that vegetarian meat. I want pasta." Patient's dietary requests and statement about throwing a tray with meat communicated to primary RN.       PAST MEDICAL & SURGICAL HISTORY:  ESRD on hemodialysis    Vision loss of left eye    Osteoporosis    Osteoarthritis    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    Diabetic neuropathy    Diabetes mellitus    Hyperparathyroidism    Spinal stenosis of lumbosacral region    Peripheral vascular disease    HLD (hyperlipidemia)    Coronary artery disease    Glaucoma    Anemia    CKD (chronic kidney disease)    Adrenal insufficiency    Hypertension    S/P arteriovenous (AV) fistula creation      History of left cataract extraction    History of right cataract extraction    Below knee amputation status, left        SOCIAL HISTORY:    Admitted from:  [facility]   Substance abuse history:  prior cocaine use, sober for 3 years   Tobacco hx:            Alcohol hx: prior, sober for 3 years          Home Opioid hx:  Sabianist:                                    Preferred Language: English     FAMILY HISTORY: per chart, 3 sisters  (2 from HIV, 1 from asthma)      Baseline ADLs (prior to admission): requires assistance     Allergies    fish (Rash)  liver (Anaphylaxis)  No Known Drug Allergies    Intolerances      Present Symptoms:   ESAS Pain: denies  ESAS Fatigue: denies  ESAS Nausea: denies   ESAS Lack of Appetite: denies   ESAS SOB: denies   ESAS Depression: denies   ESAS Anxiety: denies   Review of Systems: All others negative     Karnofsky Score: 60%  ECOG Performance:    MEDICATIONS  (STANDING):  epoetin beverley-epbx (RETACRIT) Injectable 75968 Unit(s) IV Push <User Schedule>  folic acid 1 milliGRAM(s) Oral daily  heparin   Injectable 5000 Unit(s) SubCutaneous every 8 hours  insulin lispro (ADMELOG) corrective regimen sliding scale   SubCutaneous Before meals and at bedtime  nortriptyline 10 milliGRAM(s) Oral daily  pantoprazole    Tablet 40 milliGRAM(s) Oral before breakfast  petrolatum white Ointment 1 Application(s) Topical daily  sevelamer carbonate 1600 milliGRAM(s) Oral three times a day with meals  sevelamer carbonate 800 milliGRAM(s) Oral <User Schedule>  simvastatin 40 milliGRAM(s) Oral at bedtime  sodium bicarbonate 650 milliGRAM(s) Oral three times a day  sucralfate suspension 1 Gram(s) Oral four times a day    MEDICATIONS  (PRN):  acetaminophen   Tablet .. 650 milliGRAM(s) Oral every 6 hours PRN Mild Pain (1 - 3)  ondansetron    Tablet 4 milliGRAM(s) Oral every 6 hours PRN for nausea      PHYSICAL EXAM:  Vital Signs Last 24 Hrs  T(C): 36.6 (2021 10:05), Max: 36.6 (2021 10:05)  T(F): 97.8 (2021 10:05), Max: 97.8 (2021 10:05)  HR: 96 (2021 10:05) (82 - 96)  BP: 163/95 (2021 10:05) (129/69 - 163/95)  BP(mean): --  RR: 18 (2021 10:05) (18 - 20)  SpO2: 100% (2021 10:05) (98% - 100%)    General: alert  oriented x 3   verbal   HEENT: no abnormal lesion, bilateral cloudy irises  Lungs: breathing comfortably   CV: RRR  GI: soft non distended non tender         last BM:   : oliguria  Musculoskeletal: left BKA, RUE AV fistula   Skin:  see nursing assessment for further details   Neuro: no deficits  Oral intake ability: full capability    LABS:                        8.5    2.98  )-----------( 111      ( 2021 09:18 )             25.7     02-    135  |  107  |  76<H>  ----------------------------<  99  4.7   |  15<L>  |  19.60<H>    Ca    8.2<L>      2021 09:18  Phos  3.5     02-  Mg     3.2     -    TPro  6.2  /  Alb  2.8<L>  /  TBili  0.4  /  DBili  x   /  AST  <3<L>  /  ALT  9<L>  /  AlkPhos  131<H>  -        RADIOLOGY & ADDITIONAL STUDIES: reviewed in [place]

## 2021-02-01 NOTE — PROGRESS NOTE ADULT - PROBLEM SELECTOR PLAN 1
Pt with ESRD on HD, refusing sessions for 1 weeks   Pt has no understanding of disease   currently has no signs of fluid overload or hyperkalemia but noted to have acidosis  patient was seen by psychaitry and patient has the capacity to refuse HD and No indication for standing or PRN psychotropic medications at this time.  he agreed to dialysis yesterday Pt with ESRD on HD, refusing sessions for 1 weeks   Pt has no understanding of disease   currently has no signs of fluid overload or hyperkalemia but noted to have acidosis  patient was seen by psychaitry and patient has the capacity to refuse HD and No indication for standing or PRN psychotropic medications at this time.  he agreed to dialysis but did not finish it.

## 2021-02-01 NOTE — GOALS OF CARE CONVERSATION - ADVANCED CARE PLANNING - CONVERSATION DETAILS
Palliative fellow Dr. Carbajal discussion with patient on 2/1/21: He states he came to the hospital "to get dialysis, I got dialysis, now I want to go back to my facility" and resume HD at his outpatient dialysis center. When patient asked if he were unable to make his decisions, if there was someone he would want to make them, he stated "I make my own decisions and I don't want you talking to anyone right now about me". With regards to code status, "if my heart stops and they can make me alive again, I want it."    Per behavioral health note on this admission, patient's brother Georgi King [phone] has been involved in his care before and is HCP. Patient has two sons who are out of state and not involved with patient. Patient currently with decision making capacity regarding refusal of HD

## 2021-02-01 NOTE — CONSULT NOTE ADULT - ATTENDING COMMENTS
61 yo Male who is on chronic HD with recent hx of refusing Dialysis - asked to assess GOC - pt wants to live and continue all measure of life sustaining measures but seems to have depressed mood and using dialysis expressed his feelings. Recommend continued counseling and reassurance, add remeron. He has full capacity at this time and does not want to acknowledge anyone else to make decisions for him or the near future. For now as per primary team, he is willing to be compliant with dialysis.

## 2021-02-01 NOTE — PROGRESS NOTE ADULT - PROBLEM SELECTOR PLAN 6
IMPROVE VTE Individual Risk Assessment  RISK                                                         Points  [  ] Previous VTE                                      3  [  ] Thrombophilia                                   2  [  ] Lower limb paralysis                         2 (unable to hold up >15 seconds)    [  ] Current Cancer                                  2       (within 6 months)  [  ] Immobilization > 24 hrs                    1  [  ] ICU/CCU stay > 24 hrs                         1  [  ] Age > 60                                              1  will start on heparin for dvt ppx
IMPROVE VTE Individual Risk Assessment  RISK                                                         Points  [  ] Previous VTE                                      3  [  ] Thrombophilia                                   2  [  ] Lower limb paralysis                         2 (unable to hold up >15 seconds)    [  ] Current Cancer                                  2       (within 6 months)  [  ] Immobilization > 24 hrs                    1  [  ] ICU/CCU stay > 24 hrs                         1  [  ] Age > 60                                              1   on heparin for dvt ppx

## 2021-02-01 NOTE — PROGRESS NOTE ADULT - SUBJECTIVE AND OBJECTIVE BOX
PGY-1 Progress Note discussed with attending    PAGER #: [[phone]] TILL 5:00 PM  PLEASE CONTACT ON CALL TEAM:  - On Call Team (Please refer to Dejon) FROM 5:00 PM - 8:30PM  - Nightfloat Team FROM 8:30 -7:30 AM        INTERVAL HPI/OVERNIGHT EVENTS:   patient examined be dside, he is comfortable, not in distress, patient was seen by psychaitry and patient has the capacity to refuse HD and No indication for standing or PRN psychotropic medications at this time.patient agreed to go to haemodialysis today , dc planning to [facility] onsite dialysis          REVIEW OF SYSTEMS:  CONSTITUTIONAL: No fever, weight loss, or fatigue  RESPIRATORY: No cough, wheezing, chills or hemoptysis; No shortness of breath  CARDIOVASCULAR: No chest pain, palpitations, dizziness, or leg swelling  GASTROINTESTINAL: No abdominal pain. No nausea, vomiting, or hematemesis; No diarrhea or constipation. No melena or hematochezia.  GENITOURINARY: No dysuria or hematuria, urinary frequency  NEUROLOGICAL: No headaches, memory loss, loss of strength, numbness, or tremors  SKIN: No itching, burning, rashes, or lesions     MEDICATIONS  (STANDING):  epoetin beverley-epbx (RETACRIT) Injectable 42971 Unit(s) IV Push <User Schedule>  folic acid 1 milliGRAM(s) Oral daily  heparin   Injectable 5000 Unit(s) SubCutaneous every 8 hours  insulin lispro (ADMELOG) corrective regimen sliding scale   SubCutaneous Before meals and at bedtime  nortriptyline 10 milliGRAM(s) Oral daily  pantoprazole    Tablet 40 milliGRAM(s) Oral before breakfast  petrolatum white Ointment 1 Application(s) Topical daily  sevelamer carbonate 1600 milliGRAM(s) Oral three times a day with meals  sevelamer carbonate 800 milliGRAM(s) Oral <User Schedule>  simvastatin 40 milliGRAM(s) Oral at bedtime  sodium bicarbonate 650 milliGRAM(s) Oral three times a day  sucralfate suspension 1 Gram(s) Oral four times a day    MEDICATIONS  (PRN):  acetaminophen   Tablet .. 650 milliGRAM(s) Oral every 6 hours PRN Mild Pain (1 - 3)  ondansetron    Tablet 4 milliGRAM(s) Oral every 6 hours PRN for nausea      Vital Signs Last 24 Hrs  T(C): 36.3 (01 Feb 2021 08:45), Max: 36.4 (01 Feb 2021 00:29)  T(F): 97.3 (01 Feb 2021 08:45), Max: 97.6 (01 Feb 2021 00:29)  HR: 92 (01 Feb 2021 08:45) (82 - 92)  BP: 146/87 (01 Feb 2021 08:45) (129/69 - 146/87)  BP(mean): --  RR: 18 (01 Feb 2021 08:45) (18 - 20)  SpO2: 100% (01 Feb 2021 08:45) (98% - 100%)    PHYSICAL EXAMINATION:  GENERAL: NAD, blind   HEAD:  Atraumatic, Normocephalic  EYES:  conjunctiva and sclera clear, blind  NECK: Supple, No JVD, Normal thyroid  NERVOUS SYSTEM:  Alert & Oriented X3,    EXTREMITIES:  2+ Peripheral Pulses,  L BKA,  SKIN: warm dry  Rest of physical exam wasnot done because the patient refused,                    8.5    2.98  )-----------( 111      ( 01 Feb 2021 09:18 )             25.7     02-01    135  |  107  |  76<H>  ----------------------------<  99  4.7   |  15<L>  |  19.60<H>    Ca    8.2<L>      01 Feb 2021 09:18  Phos  3.5     02-01  Mg     3.2     02-01    TPro  6.2  /  Alb  2.8<L>  /  TBili  0.4  /  DBili  x   /  AST  <3<L>  /  ALT  9<L>  /  AlkPhos  131<H>  02-01    LIVER FUNCTIONS - ( 01 Feb 2021 09:18 )  Alb: 2.8 g/dL / Pro: 6.2 g/dL / ALK PHOS: 131 U/L / ALT: 9 U/L DA / AST: <3 U/L / GGT: x                       CAPILLARY BLOOD GLUCOSE  CAPILLARY BLOOD GLUCOSE      POCT Blood Glucose.: 155 mg/dL (01 Feb 2021 07:42)    CAPILLARY BLOOD GLUCOSE      POCT Blood Glucose.: 155 mg/dL (01 Feb 2021 07:42)  POCT Blood Glucose.: 171 mg/dL (31 Jan 2021 11:46)      RADIOLOGY & ADDITIONAL TESTS:                   PGY-1 Progress Note discussed with attending    PAGER #: [[phone]] TILL 5:00 PM  PLEASE CONTACT ON CALL TEAM:  - On Call Team (Please refer to Dejon) FROM 5:00 PM - 8:30PM  - Nightfloat Team FROM 8:30 -7:30 AM        INTERVAL HPI/OVERNIGHT EVENTS:   patient examined be dside, he is comfortable, not in distress, patient was seen by psychaitry and patient has the capacity to refuse HD and No indication for standing or PRN psychotropic medications at this time.patient agreed to go to haemodialysis today , didnot finish the whole session. dc planning to [facility] onsite dialysis          REVIEW OF SYSTEMS:  CONSTITUTIONAL: No fever, weight loss, or fatigue  RESPIRATORY: No cough, wheezing, chills or hemoptysis; No shortness of breath  CARDIOVASCULAR: No chest pain, palpitations, dizziness, or leg swelling  GASTROINTESTINAL: No abdominal pain. No nausea, vomiting, or hematemesis; No diarrhea or constipation. No melena or hematochezia.  GENITOURINARY: No dysuria or hematuria, urinary frequency  NEUROLOGICAL: No headaches, memory loss, loss of strength, numbness, or tremors  SKIN: No itching, burning, rashes, or lesions     MEDICATIONS  (STANDING):  epoetin beverley-epbx (RETACRIT) Injectable 13712 Unit(s) IV Push <User Schedule>  folic acid 1 milliGRAM(s) Oral daily  heparin   Injectable 5000 Unit(s) SubCutaneous every 8 hours  insulin lispro (ADMELOG) corrective regimen sliding scale   SubCutaneous Before meals and at bedtime  nortriptyline 10 milliGRAM(s) Oral daily  pantoprazole    Tablet 40 milliGRAM(s) Oral before breakfast  petrolatum white Ointment 1 Application(s) Topical daily  sevelamer carbonate 1600 milliGRAM(s) Oral three times a day with meals  sevelamer carbonate 800 milliGRAM(s) Oral <User Schedule>  simvastatin 40 milliGRAM(s) Oral at bedtime  sodium bicarbonate 650 milliGRAM(s) Oral three times a day  sucralfate suspension 1 Gram(s) Oral four times a day    MEDICATIONS  (PRN):  acetaminophen   Tablet .. 650 milliGRAM(s) Oral every 6 hours PRN Mild Pain (1 - 3)  ondansetron    Tablet 4 milliGRAM(s) Oral every 6 hours PRN for nausea      Vital Signs Last 24 Hrs  T(C): 36.3 (01 Feb 2021 08:45), Max: 36.4 (01 Feb 2021 00:29)  T(F): 97.3 (01 Feb 2021 08:45), Max: 97.6 (01 Feb 2021 00:29)  HR: 92 (01 Feb 2021 08:45) (82 - 92)  BP: 146/87 (01 Feb 2021 08:45) (129/69 - 146/87)  BP(mean): --  RR: 18 (01 Feb 2021 08:45) (18 - 20)  SpO2: 100% (01 Feb 2021 08:45) (98% - 100%)    PHYSICAL EXAMINATION:  GENERAL: NAD, blind   HEAD:  Atraumatic, Normocephalic  EYES:  conjunctiva and sclera clear, blind  NECK: Supple, No JVD, Normal thyroid  NERVOUS SYSTEM:  Alert & Oriented X3,    EXTREMITIES:  2+ Peripheral Pulses,  L BKA,  SKIN: warm dry  Rest of physical exam wasnot done because the patient refused,                    8.5    2.98  )-----------( 111      ( 01 Feb 2021 09:18 )             25.7     02-01    135  |  107  |  76<H>  ----------------------------<  99  4.7   |  15<L>  |  19.60<H>    Ca    8.2<L>      01 Feb 2021 09:18  Phos  3.5     02-01  Mg     3.2     02-01    TPro  6.2  /  Alb  2.8<L>  /  TBili  0.4  /  DBili  x   /  AST  <3<L>  /  ALT  9<L>  /  AlkPhos  131<H>  02-01    LIVER FUNCTIONS - ( 01 Feb 2021 09:18 )  Alb: 2.8 g/dL / Pro: 6.2 g/dL / ALK PHOS: 131 U/L / ALT: 9 U/L DA / AST: <3 U/L / GGT: x                       CAPILLARY BLOOD GLUCOSE  CAPILLARY BLOOD GLUCOSE      POCT Blood Glucose.: 155 mg/dL (01 Feb 2021 07:42)    CAPILLARY BLOOD GLUCOSE      POCT Blood Glucose.: 155 mg/dL (01 Feb 2021 07:42)  POCT Blood Glucose.: 171 mg/dL (31 Jan 2021 11:46)      RADIOLOGY & ADDITIONAL TESTS:

## 2021-02-01 NOTE — CONSULT NOTE ADULT - PROBLEM SELECTOR RECOMMENDATION 4
-management as above  -please page for any acute symptoms   Summer Carbajal MD   Hospice and Palliative Medicine Fellow -per psych notes, patient's brother Georgi King [phone] has been involved in his care before and is HCP  -patient has two sons who are out of state and not involved with patient per psych notes  -per psych, patient currently with decision making capacity regarding HD   -patient currently full code based on palliative provider conversation

## 2021-02-01 NOTE — CONSULT NOTE ADULT - PROBLEM SELECTOR RECOMMENDATION 9
-PPSV2 60%   -patient legally blind   -continue assistance with ADLs -patient appears with depressed mood but denies feeling depressed  -recommend remeron 7.5 mg qhs to help with mood, appetite and sleep

## 2021-02-01 NOTE — PROGRESS NOTE ADULT - PROBLEM SELECTOR PLAN 2
Pt noted to have severe acidosis with AG   likely in the setting of RTA vs uremic acidosis    on bicarb tabs 650 q8h   Pt needs HD in am  Dr. Mosher notified Pt noted to have severe acidosis with AG   likely in the setting of RTA vs uremic acidosis    on bicarb tabs 650 q8h   Dr. Mosher consulted

## 2021-02-01 NOTE — CONSULT NOTE ADULT - ASSESSMENT
# ESRD patient has been refusing HD . He is AAOX3 and is agreeing to HD now. ( he is partially blind. but was able to sign  consent).  Suggest palliative care to determine goals of care as well as a psychiatry consult to evaluate for depression.  HD today  # anemia of CKD. epo on HD   # renal osteodystrophy- cont sevelamer    D/C planning- suggest D/C to nursing home with onsite dialysis
61 y/o M pt with a PMHx of ESRD on HD, CAD, DM, HLD, HTN, sent to ED from [facility] by Dr. De La Torre since patient missed dialysis for about 1 week. Patient refused to have dialysis or to be sent to the hospital, therefore Dr. De La Torre stated they signed a 2 physician consent for treatment. Palliative Medicine consulted for assistanc with complex decision making.

## 2021-02-02 ENCOUNTER — TRANSCRIPTION ENCOUNTER (OUTPATIENT)
Age: 61
End: 2021-02-02

## 2021-02-02 VITALS
TEMPERATURE: 98 F | HEART RATE: 88 BPM | SYSTOLIC BLOOD PRESSURE: 126 MMHG | OXYGEN SATURATION: 100 % | RESPIRATION RATE: 17 BRPM | DIASTOLIC BLOOD PRESSURE: 67 MMHG

## 2021-02-02 LAB
ALBUMIN SERPL ELPH-MCNC: 3 G/DL — LOW (ref 3.5–5)
ALP SERPL-CCNC: 150 U/L — HIGH (ref 40–120)
ALT FLD-CCNC: 11 U/L DA — SIGNIFICANT CHANGE UP (ref 10–60)
ANION GAP SERPL CALC-SCNC: 13 MMOL/L — SIGNIFICANT CHANGE UP (ref 5–17)
AST SERPL-CCNC: <3 U/L — LOW (ref 10–40)
BILIRUB SERPL-MCNC: 0.4 MG/DL — SIGNIFICANT CHANGE UP (ref 0.2–1.2)
BUN SERPL-MCNC: 58 MG/DL — HIGH (ref 7–18)
CALCIUM SERPL-MCNC: 8.3 MG/DL — LOW (ref 8.4–10.5)
CHLORIDE SERPL-SCNC: 101 MMOL/L — SIGNIFICANT CHANGE UP (ref 96–108)
CO2 SERPL-SCNC: 19 MMOL/L — LOW (ref 22–31)
CREAT SERPL-MCNC: 15 MG/DL — HIGH (ref 0.5–1.3)
GLUCOSE SERPL-MCNC: 99 MG/DL — SIGNIFICANT CHANGE UP (ref 70–99)
HCT VFR BLD CALC: 29.6 % — LOW (ref 39–50)
HGB BLD-MCNC: 9.5 G/DL — LOW (ref 13–17)
MAGNESIUM SERPL-MCNC: 2.9 MG/DL — HIGH (ref 1.6–2.6)
MCHC RBC-ENTMCNC: 27.9 PG — SIGNIFICANT CHANGE UP (ref 27–34)
MCHC RBC-ENTMCNC: 32.1 GM/DL — SIGNIFICANT CHANGE UP (ref 32–36)
MCV RBC AUTO: 87.1 FL — SIGNIFICANT CHANGE UP (ref 80–100)
NRBC # BLD: 0 /100 WBCS — SIGNIFICANT CHANGE UP (ref 0–0)
PHOSPHATE SERPL-MCNC: 3.4 MG/DL — SIGNIFICANT CHANGE UP (ref 2.5–4.5)
PLATELET # BLD AUTO: 147 K/UL — LOW (ref 150–400)
POTASSIUM SERPL-MCNC: 4.2 MMOL/L — SIGNIFICANT CHANGE UP (ref 3.5–5.3)
POTASSIUM SERPL-SCNC: 4.2 MMOL/L — SIGNIFICANT CHANGE UP (ref 3.5–5.3)
PROT SERPL-MCNC: 6.9 G/DL — SIGNIFICANT CHANGE UP (ref 6–8.3)
RBC # BLD: 3.4 M/UL — LOW (ref 4.2–5.8)
RBC # FLD: 18.2 % — HIGH (ref 10.3–14.5)
SODIUM SERPL-SCNC: 133 MMOL/L — LOW (ref 135–145)
WBC # BLD: 4.09 K/UL — SIGNIFICANT CHANGE UP (ref 3.8–10.5)
WBC # FLD AUTO: 4.09 K/UL — SIGNIFICANT CHANGE UP (ref 3.8–10.5)

## 2021-02-02 PROCEDURE — 83735 ASSAY OF MAGNESIUM: CPT

## 2021-02-02 PROCEDURE — 86225 DNA ANTIBODY NATIVE: CPT

## 2021-02-02 PROCEDURE — 83010 ASSAY OF HAPTOGLOBIN QUANT: CPT

## 2021-02-02 PROCEDURE — 83550 IRON BINDING TEST: CPT

## 2021-02-02 PROCEDURE — 87340 HEPATITIS B SURFACE AG IA: CPT

## 2021-02-02 PROCEDURE — 84100 ASSAY OF PHOSPHORUS: CPT

## 2021-02-02 PROCEDURE — 87635 SARS-COV-2 COVID-19 AMP PRB: CPT

## 2021-02-02 PROCEDURE — 85652 RBC SED RATE AUTOMATED: CPT

## 2021-02-02 PROCEDURE — 80053 COMPREHEN METABOLIC PANEL: CPT

## 2021-02-02 PROCEDURE — 85025 COMPLETE CBC W/AUTO DIFF WBC: CPT

## 2021-02-02 PROCEDURE — 82746 ASSAY OF FOLIC ACID SERUM: CPT

## 2021-02-02 PROCEDURE — 83540 ASSAY OF IRON: CPT

## 2021-02-02 PROCEDURE — 71045 X-RAY EXAM CHEST 1 VIEW: CPT

## 2021-02-02 PROCEDURE — 82607 VITAMIN B-12: CPT

## 2021-02-02 PROCEDURE — 99285 EMERGENCY DEPT VISIT HI MDM: CPT

## 2021-02-02 PROCEDURE — 84443 ASSAY THYROID STIM HORMONE: CPT

## 2021-02-02 PROCEDURE — 82140 ASSAY OF AMMONIA: CPT

## 2021-02-02 PROCEDURE — 82962 GLUCOSE BLOOD TEST: CPT

## 2021-02-02 PROCEDURE — 82728 ASSAY OF FERRITIN: CPT

## 2021-02-02 PROCEDURE — 36415 COLL VENOUS BLD VENIPUNCTURE: CPT

## 2021-02-02 PROCEDURE — U0005: CPT

## 2021-02-02 PROCEDURE — 85027 COMPLETE CBC AUTOMATED: CPT

## 2021-02-02 PROCEDURE — 80061 LIPID PANEL: CPT

## 2021-02-02 PROCEDURE — 96374 THER/PROPH/DIAG INJ IV PUSH: CPT

## 2021-02-02 PROCEDURE — 99261: CPT

## 2021-02-02 PROCEDURE — 84484 ASSAY OF TROPONIN QUANT: CPT

## 2021-02-02 RX ADMIN — ONDANSETRON 4 MILLIGRAM(S): 8 TABLET, FILM COATED ORAL at 06:53

## 2021-02-02 RX ADMIN — Medication 1 APPLICATION(S): at 11:04

## 2021-02-02 NOTE — PROGRESS NOTE ADULT - ASSESSMENT
# ESRD patient sent from assisted living facility with refusal ofHD .   Has been agreeing to HD during this hospital stay however. cuts HD time.  Seen by psychiatrist, deemed to have capacity to make decisions.  Cont NAHCO3 tabs for now.    # anemia of CKD. epo on HD   # renal osteodystrophy- cont sevelamer    D/C planning- 
59 y/o M pt with a PMHx of ESRD on HD, CAD, DM, HLD, sent to ED from [facility] by Dr. De La Torre since patient missed dialysis for about 1 week. 
# ESRD patient sent from assisted living facility with refusal ofHD .   He  agreed  to HD yesterday but only did two hrs of HD   Offered HD today but refused  Seen by psychiatrist, deemed to have capacity to make decisions.  Cont NAHCO3 tabs for now.  RPT BMP in AM     # anemia of CKD. epo on HD   # renal osteodystrophy- cont sevelamer    D/C planning- suggest D/C to nursing home with onsite dialysis
59 y/o M pt with a PMHx of ESRD on HD, CAD, DM, HLD, sent to ED from [facility] by Dr. De La Torre since patient missed dialysis for about 1 week. Patient refused to have dialysis or to be sent to the hospital, therefore Dr. De La Torre stated they signed a 2 physician consent for treatment. Pt is admitted for non emergent HD inpatient

## 2021-02-02 NOTE — PROGRESS NOTE ADULT - SUBJECTIVE AND OBJECTIVE BOX
[facility] : Progress Note :: [phone], (office [phone]),   Dr Mosher / Dr Washington / Dr Young / Dr Doll / Dr Varsha TURCIOS / Dr Tello / Dr Garcia / Dr Larry qiu  _____________________________________________________________________________________________    awaits for discharge back to assisted living facility    fish (Rash)  liver (Anaphylaxis)  No Known Drug Allergies    Hospital Medications:   MEDICATIONS  (STANDING):  epoetin beverley-epbx (RETACRIT) Injectable 41896 Unit(s) IV Push <User Schedule>  folic acid 1 milliGRAM(s) Oral daily  heparin   Injectable 5000 Unit(s) SubCutaneous every 8 hours  insulin lispro (ADMELOG) corrective regimen sliding scale   SubCutaneous Before meals and at bedtime  mirtazapine 7.5 milliGRAM(s) Oral at bedtime  pantoprazole    Tablet 40 milliGRAM(s) Oral before breakfast  petrolatum white Ointment 1 Application(s) Topical daily  sevelamer carbonate 1600 milliGRAM(s) Oral three times a day with meals  sevelamer carbonate 800 milliGRAM(s) Oral <User Schedule>  simvastatin 40 milliGRAM(s) Oral at bedtime  sodium bicarbonate 650 milliGRAM(s) Oral three times a day  sucralfate suspension 1 Gram(s) Oral four times a day        VITALS:  T(F): 98.3 (02-02-21 @ 09:51), Max: 98.3 (02-02-21 @ 09:51)  HR: 88 (02-02-21 @ 09:51)  BP: 126/67 (02-02-21 @ 09:51)  RR: 17 (02-02-21 @ 09:51)  SpO2: 100% (02-02-21 @ 09:51)  Wt(kg): --    02-01 @ 07:01  -  02-02 @ 07:00  --------------------------------------------------------  IN: 500 mL / OUT: 1049 mL / NET: -549 mL        PHYSICAL EXAM:  Constitutional: NAD  HEENT: anicteric sclera, oropharynx clear  Neck: No JVD  Respiratory: CTAB, no wheezes, rales or rhonchi  Cardiovascular: S1, S2, RRR  Gastrointestinal: BS+, soft, NT/ND  Extremities:  No peripheral edema  Neurological: A/O x 3, no focal deficits  Vascular Access: AVF with thrill and bruit    LABS:  02-02    133<L>  |  101  |  58<H>  ----------------------------<  99  4.2   |  19<L>  |  15.00<H>    Ca    8.3<L>      02 Feb 2021 07:18  Phos  3.4     02-02  Mg     2.9     02-02    TPro  6.9  /  Alb  3.0<L>  /  TBili  0.4  /  DBili      /  AST  <3<L>  /  ALT  11  /  AlkPhos  150<H>  02-02    Creatinine Trend: 15.00 <--, 19.60 <--, 25.70 <--, 25.00 <--                        9.5    4.09  )-----------( 147      ( 02 Feb 2021 07:18 )             29.6     Urine Studies:      RADIOLOGY & ADDITIONAL STUDIES:

## 2021-02-02 NOTE — DISCHARGE NOTE NURSING/CASE MANAGEMENT/SOCIAL WORK - NSDCVIVACCINE_GEN_ALL_CORE_FT
Patient Specific Counseling (Will Not Stick From Patient To Patient): Referral to Dr. Shelton for light therapy for Psoriasis. Detail Level: Zone Detail Level: Generalized No Vaccines Administered.

## 2021-03-15 ENCOUNTER — APPOINTMENT (OUTPATIENT)
Dept: SURGERY | Facility: CLINIC | Age: 61
End: 2021-03-15
Payer: MEDICAID

## 2021-03-15 VITALS
HEART RATE: 96 BPM | BODY MASS INDEX: 17.36 KG/M2 | DIASTOLIC BLOOD PRESSURE: 74 MMHG | HEIGHT: 66 IN | TEMPERATURE: 97.7 F | SYSTOLIC BLOOD PRESSURE: 124 MMHG | WEIGHT: 108 LBS

## 2021-03-15 PROCEDURE — 99243 OFF/OP CNSLTJ NEW/EST LOW 30: CPT

## 2021-03-15 NOTE — PLAN
[FreeTextEntry1] : Mr. VALERO  was told significance of findings, options, risks and benefits were explained.  Informed consent for excision lower back mass and potential risks, benefits and alternatives (surgical options were discussed including non-surgical options or the option of no surgery) to the planned surgery were discussed in depth.  All surgical options were discussed including non-surgical treatments.  He wishes to proceed with surgery.  We will plan for surgery on at the next available date, pending any required insurance pre-certification or pre-approval. He agrees to obtain any necessary pre-operative evaluations and testing prior to surgery.\par Patient advised to seek immediate medical attention with any acute change in symptoms or with the development of any new or worsening symptoms.  Patient's questions and concerns addressed to patient's satisfaction, and patient verbalized an understanding of the information discussed.\par \par

## 2021-03-15 NOTE — HISTORY OF PRESENT ILLNESS
[de-identified] : This is a 60 year  old patient who was referred by Dr. Britt Bishop with the chief complaint of having lower back mass.  He reports having this condition for 2 months . He denies any trauma to the area.   He denies any fever or  night sweats. Appetite is good and weight is stable.  He states that the mass is getting bigger and  more symptomatic. He wants to know if it could  be surgically  removed.\par

## 2021-03-15 NOTE — PHYSICAL EXAM
[Alert] : alert [Oriented to Person] : oriented to person [Oriented to Place] : oriented to place [Oriented to Time] : oriented to time [Calm] : calm [de-identified] : He  is alert, well-groomed, and in NAD\par   [de-identified] : left eye injury,  [de-identified] : right arm AV fistula, left leg AKA  [de-identified] : \par lower back mass is mobile, Firm,  Smooth, tender,   Well defined. Superficial. No palpable lymph nodes.   Mass size - 2  cm x  2 cm.

## 2021-03-15 NOTE — CONSULT LETTER
[Dear  ___] : Dear  [unfilled], [Consult Letter:] : I had the pleasure of evaluating your patient, [unfilled]. [Please see my note below.] : Please see my note below. [Consult Closing:] : Thank you very much for allowing me to participate in the care of this patient.  If you have any questions, please do not hesitate to contact me. [Sincerely,] : Sincerely, [FreeTextEntry3] : Tiago Nieto MD, FACS

## 2021-03-18 NOTE — DISCHARGE NOTE ADULT - MEDICATION SUMMARY - MEDICATIONS TO CHANGE
I will SWITCH the dose or number of times a day I take the medications listed below when I get home from the hospital:  None
Universal Safety Interventions

## 2021-03-22 ENCOUNTER — OUTPATIENT (OUTPATIENT)
Dept: OUTPATIENT SERVICES | Facility: HOSPITAL | Age: 61
LOS: 1 days | End: 2021-03-22
Payer: MEDICAID

## 2021-03-22 VITALS
TEMPERATURE: 98 F | SYSTOLIC BLOOD PRESSURE: 147 MMHG | HEART RATE: 78 BPM | WEIGHT: 117.95 LBS | DIASTOLIC BLOOD PRESSURE: 98 MMHG | RESPIRATION RATE: 16 BRPM | HEIGHT: 66 IN | OXYGEN SATURATION: 98 %

## 2021-03-22 DIAGNOSIS — M79.89 OTHER SPECIFIED SOFT TISSUE DISORDERS: ICD-10-CM

## 2021-03-22 DIAGNOSIS — Z98.42 CATARACT EXTRACTION STATUS, LEFT EYE: Chronic | ICD-10-CM

## 2021-03-22 DIAGNOSIS — Z98.890 OTHER SPECIFIED POSTPROCEDURAL STATES: Chronic | ICD-10-CM

## 2021-03-22 DIAGNOSIS — F32.9 MAJOR DEPRESSIVE DISORDER, SINGLE EPISODE, UNSPECIFIED: ICD-10-CM

## 2021-03-22 DIAGNOSIS — R22.2 LOCALIZED SWELLING, MASS AND LUMP, TRUNK: ICD-10-CM

## 2021-03-22 DIAGNOSIS — N18.6 END STAGE RENAL DISEASE: ICD-10-CM

## 2021-03-22 DIAGNOSIS — Z29.9 ENCOUNTER FOR PROPHYLACTIC MEASURES, UNSPECIFIED: ICD-10-CM

## 2021-03-22 DIAGNOSIS — E78.5 HYPERLIPIDEMIA, UNSPECIFIED: ICD-10-CM

## 2021-03-22 DIAGNOSIS — Z98.41 CATARACT EXTRACTION STATUS, RIGHT EYE: Chronic | ICD-10-CM

## 2021-03-22 DIAGNOSIS — Z01.818 ENCOUNTER FOR OTHER PREPROCEDURAL EXAMINATION: ICD-10-CM

## 2021-03-22 DIAGNOSIS — Z89.512 ACQUIRED ABSENCE OF LEFT LEG BELOW KNEE: Chronic | ICD-10-CM

## 2021-03-22 DIAGNOSIS — Z87.448 PERSONAL HISTORY OF OTHER DISEASES OF URINARY SYSTEM: Chronic | ICD-10-CM

## 2021-03-22 DIAGNOSIS — K21.9 GASTRO-ESOPHAGEAL REFLUX DISEASE WITHOUT ESOPHAGITIS: ICD-10-CM

## 2021-03-22 PROCEDURE — G0463: CPT

## 2021-03-22 RX ORDER — LIDOCAINE 4 G/100G
1 CREAM TOPICAL
Qty: 0 | Refills: 0 | DISCHARGE

## 2021-03-22 RX ORDER — ONDANSETRON 8 MG/1
1 TABLET, FILM COATED ORAL
Qty: 0 | Refills: 0 | DISCHARGE

## 2021-03-22 RX ORDER — LIDOCAINE 4 G/100G
0 CREAM TOPICAL
Qty: 0 | Refills: 0 | DISCHARGE

## 2021-03-22 RX ORDER — TRAMADOL HYDROCHLORIDE 50 MG/1
1 TABLET ORAL
Qty: 0 | Refills: 0 | DISCHARGE

## 2021-03-22 RX ORDER — PETROLATUM,WHITE
1 JELLY (GRAM) TOPICAL
Qty: 0 | Refills: 0 | DISCHARGE

## 2021-03-22 NOTE — H&P PST ADULT - EXTREMITIES COMMENTS
right arm AV fistula , positive thrill, left hand dorsal part redenss s/p IV at hospital, covered with bandaid right arm AV fistula , positive thrill, left hand dorsal part healing IV site after IV at hospital, covered with band aid

## 2021-03-22 NOTE — H&P PST ADULT - MUSCULOSKELETAL
detailed exam details… decreased mobility due to left BKA decreased mobility due to left BKA/no joint swelling/no joint erythema/no joint warmth/decreased ROM/diminished strength

## 2021-03-22 NOTE — H&P PST ADULT - CARDIOVASCULAR DETAILS
positive S1/positive S2 Island Pedicle Flap With Canthal Suspension Text: The defect edges were debeveled with a #15 scalpel blade.  Given the location of the defect, shape of the defect and the proximity to free margins an island pedicle advancement flap was deemed most appropriate.  Using a sterile surgical marker, an appropriate advancement flap was drawn incorporating the defect, outlining the appropriate donor tissue and placing the expected incisions within the relaxed skin tension lines where possible. The area thus outlined was incised deep to adipose tissue with a #15 scalpel blade.  The skin margins were undermined to an appropriate distance in all directions around the primary defect and laterally outward around the island pedicle utilizing iris scissors.  There was minimal undermining beneath the pedicle flap. A suspension suture was placed in the canthal tendon to prevent tension and prevent ectropion.

## 2021-03-22 NOTE — H&P PST ADULT - MUSCULOSKELETAL COMMENTS
OA, osteoporosis, osteomyelitis of vertebrae, LS spinal stenosis, contractures of right and left hand fingers left leg

## 2021-03-22 NOTE — H&P PST ADULT - NSICDXPASTSURGICALHX_GEN_ALL_CORE_FT
PAST SURGICAL HISTORY:  Below knee amputation status, left 2012- pt is wearing prostesis    H/O hematuria s/p bladder bx and fulguration 2/25/2020    History of left cataract extraction     History of right cataract extraction     S/P arteriovenous (AV) fistula creation right arm brachiocephalic arteriovenous fistula on 11/08/2018     PAST SURGICAL HISTORY:  Below knee amputation status, left 2012- pt is wearing prostesis    H/O hematuria s/p bladder bx and fulguration 2/25/2020    H/O transurethral destruction of bladder lesion 2020    History of left cataract extraction     History of right cataract extraction     S/P arteriovenous (AV) fistula creation right arm brachiocephalic arteriovenous fistula on 11/08/2018

## 2021-03-22 NOTE — H&P PST ADULT - ASSESSMENT
60 yr old male with PMH of HTN, DM, diabetic retinopathy, glaucoma, osteoporosis, OA, LS spinal stenosis, PVD, hyperparathyroidism, PVD, , right arm AV fistula formation 2018 used for HD Tue , TH, Sat, s/p bladder bx and fulguration 2020 for hematuria,  hx of UTI , x presents with assist , walking with walker ( rolling ) to [facility] for evaluation before sx. Patient was diagnosed with lower back mass and is scheduled for excision of lower back mass on 3/31/2021.  Pt for right arm brachiocephalic arteriovenous fistula on 11/08/2018 in anticipation of hemodialysis.   60 yr old male with PMH of HTN, DM, diabetic retinopathy, glaucoma, osteoporosis, OA, LS spinal stenosis, PVD, hyperparathyroidism, PVD, , right arm AV fistula formation 2018 used for HD Tue , TH, Sat, s/p bladder bx and fulguration 2020 for hematuria,  hx of UTI , x presents with with lower back mass . 60 yr old male with PMH of HTN, DM, diabetic retinopathy, depression, GERD, glaucoma, osteoporosis, OA, LS spinal stenosis, PVD, hyperparathyroidism, right arm AV fistula formation 2018 used for HD Tue , TH, Sat, s/p bladder bx and fulguration 2020 for hematuria,  hx of UTI , x presents with  lower back mass .

## 2021-03-22 NOTE — H&P PST ADULT - PRO ARRIVE FROM
at [facility] accompanied ny [facility]assisted living facility at [facility] accompanied ny [facility]/assisted living facility

## 2021-03-22 NOTE — H&P PST ADULT - GENERAL GENITOURINARY SYMPTOMS
hx of hematuria and bladder bx 2020, hx of bladder mass and TURBT 2020, end stage renal disease on HD , Tue, Th , Sat via right arm AV fistula/hematuria

## 2021-03-22 NOTE — H&P PST ADULT - NSICDXPASTMEDICALHX_GEN_ALL_CORE_FT
PAST MEDICAL HISTORY:  Adrenal insufficiency     Anemia     BPH (benign prostatic hyperplasia)     Cataract both eyes - hx of sx done    Contracture of hand fingers of right and left hand    Coronary artery disease     Diabetes mellitus     Diabetic neuropathy     ESRD on hemodialysis     Glaucoma     HLD (hyperlipidemia)     Hyperparathyroidism     Hypertension     Osteoarthritis     Peripheral vascular disease     Spinal stenosis of lumbosacral region     Vision loss of left eye      PAST MEDICAL HISTORY:  Adrenal insufficiency     Anemia     Bladder mass hx of    BPH (benign prostatic hyperplasia)     Cataract both eyes - hx of sx done    Contracture of hand fingers of right and left hand    Coronary artery disease     Diabetes mellitus     Diabetic neuropathy     ESRD on hemodialysis     Glaucoma     H/O hematuria     HLD (hyperlipidemia)     Hyperparathyroidism     Hypertension     Osteoarthritis     Osteoporosis     Peripheral vascular disease     Spinal stenosis of lumbosacral region     UTI (urinary tract infection) hx of    Vision loss of left eye     Vision loss of right eye      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    Coronary artery disease     Depression     Diabetes mellitus     Diabetic neuropathy     ESRD on hemodialysis     Glaucoma     H/O hematuria     HLD (hyperlipidemia)     Hyperparathyroidism     Hypertension     Osteoarthritis     Osteomyelitis of vertebra     Osteoporosis     Peripheral vascular disease     Spinal stenosis of lumbosacral region     UTI (urinary tract infection) hx of    Vision loss of left eye     Vision loss of right eye

## 2021-03-22 NOTE — H&P PST ADULT - NSICDXPROBLEM_GEN_ALL_CORE_FT
PROBLEM DIAGNOSES  Problem: Prophylactic measure  Assessment and Plan: Preoperative instructions discussed with pt and HHA Danii Arredondo, written version given to Nurse from [facility]  and withinstructions to call us if luis qiestions .  . Instructed pt that no one will be allowed to come to hospital with patient , reccomended transport to and from [facility]  via ambulance on day of sx,  to notify security on arrival to the lobby of the hospital that today is day of surgery. Patient notified  not to eat or drink anything after midnight the night before the surgery,  to take Percocet if needed for pain  to report exposure to anyone with any contagious diseases including Covid-19 or if patient is exhibiting any symptoms of COVID-19. Chlorhexidine 4% soap given to pt. Instructed patient and HHA and written instructions given to bring to Nurse about use of Chlorhexidine 4% soap before surgery. Patient and HHA verbalized understanding of instructions given.    Problem: Palpable mass of lower back  Assessment and Plan: Patient  is scheduled for excision of lower back mass on 3/31/2021.         PROBLEM DIAGNOSES  Problem: HLD (hyperlipidemia)  Assessment and Plan: Instructed pt to continue Zocor. Follow-up with PCP postop for lipid management      Problem: End stage renal disease on dialysis  Assessment and Plan: Patient to have HD on Thursday , day before surgery, in am of sx to repeat BMP    Problem: Chronic GERD  Assessment and Plan: Continue Protonix and take with sips of water on day of surgery. Follow-up with provider postop for management.      Problem: Depression  Assessment and Plan: Take Pamelor as prescribed , f/u with PCP    Problem: Prophylactic measure  Assessment and Plan: Preoperative instructions discussed with pt and HHA Danii Arredondo, written version given to Nurse from [facility]  and withinstructions to call us if luis qiestions .  . Instructed pt that no one will be allowed to come to hospital with patient , reccomended transport to and from [facility]  via ambulance on day of sx,  to notify security on arrival to the lobby of the hospital that today is day of surgery. Patient notified  not to eat or drink anything after midnight the night before the surgery,  to take Percocet if needed for pain  to report exposure to anyone with any contagious diseases including Covid-19 or if patient is exhibiting any symptoms of COVID-19. Chlorhexidine 4% soap given to pt. Instructed patient and HHA and written instructions given to bring to Nurse about use of Chlorhexidine 4% soap before surgery. Patient and HHA verbalized understanding of instructions given.    Problem: Palpable mass of lower back  Assessment and Plan: Patient  is scheduled for excision of lower back mass on 3/31/2021.         PROBLEM DIAGNOSES  Problem: Hypertension  Assessment and Plan: Managed with HD, no meds given for BP , f/u with PCP    Problem: Type 2 diabetes mellitus  Assessment and Plan: Continueregilar insulin according to sliding scale day before surgery and hold regular insulin on day of surgery. Perioperative glucose monitoring and cover as needed at hospital. Follow-up with PCP for diabetic management      Problem: HLD (hyperlipidemia)  Assessment and Plan: Instructed pt to continue Zocor. Follow-up with PCP postop for lipid management      Problem: End stage renal disease on dialysis  Assessment and Plan: Patient to have HD on Thursday , day before surgery, in am of sx to repeat BMP    Problem: Chronic GERD  Assessment and Plan: Continue Protonix and take with sips of water on day of surgery. Follow-up with provider postop for management.      Problem: Depression  Assessment and Plan: Take Pamelor as prescribed , f/u with PCP    Problem: Prophylactic measure  Assessment and Plan: Preoperative instructions discussed with pt and HHA Danii Arredondo, written version given to Nurse from [facility]  and written instructions given to HHA to give to RN to call us if any qiestions . Instructed pt that no one will be allowed to come to hospital with patient , [facility] on day of sx,  to notify security on arrival to the lobby of the hospital that today is day of surgery. Patient notified  not to eat or drink anything after midnight the night before the surgery,  to take Percocet if needed for pain  to report exposure to anyone with any contagious diseases including Covid-19 or if patient is exhibiting any symptoms of COVID-19. Chlorhexidine 4% soap given to pt. Instructed patient and HHA and written instructions given to bring to Nurse about use of Chlorhexidine 4% soap before surgery. Patient and HHA verbalized understanding of instructions given.    Problem: Palpable mass of lower back  Assessment and Plan: Patient  is scheduled for excision of lower back mass on 3/31/2021.

## 2021-03-22 NOTE — H&P PST ADULT - HISTORY OF PRESENT ILLNESS
60 yr old male with PMH of HTN, DM, diabetic retinopathy, glaucoma, osteoporosis, OA, LS spinal stenosis, PVD, hyperparathyroidism, PVD, , right arm AV fistula formation 2018 used for HD Tue , TH, Sat, s/p bladder bx and fulguration 2020 for hematuria,  hx of UTI , x presents with assist , walking with walker ( rolling ) to [facility] for evaluation before sx. Patient was diagnosed with lower back mass and is scheduled for excision of lower back mass on 3/31/2021.  Pt for right arm brachiocephalic arteriovenous fistula on 11/08/2018 in anticipation of hemodialysis. 60 yr old male with PMH of HTN, DM, diabetic retinopathy, glaucoma, osteoporosis, OA, LS spinal stenosis, PVD, hyperparathyroidism, PVD, , right arm AV fistula formation 2018 used for HD Tue , TH, Sat, s/p bladder bx, TURBT and fulguration 2020 for hematuria,  hx of UTI , x presents with assist , walking with walker ( rolling ) to [facility] for evaluation before sx. Patient was diagnosed with lower back mass and is scheduled for excision of lower back mass on 3/31/2021.   60 yr old male with PMH of HTN, DM, diabetic retinopathy, depression, GERD, glaucoma, osteoporosis, OA, LS spinal stenosis, PVD, hyperparathyroidism, , right arm AV fistula formation 2018 used for HD Tue , TH, Sat, s/p bladder bx, TURBT and fulguration 2020 for hematuria,  hx of UTI , x presents with assist , walking with walker ( rolling ) to [facility] for evaluation before sx. Patient was diagnosed with lower back mass and is scheduled for excision of lower back mass on 3/31/2021.

## 2021-03-23 DIAGNOSIS — E11.9 TYPE 2 DIABETES MELLITUS WITHOUT COMPLICATIONS: ICD-10-CM

## 2021-03-23 DIAGNOSIS — I10 ESSENTIAL (PRIMARY) HYPERTENSION: ICD-10-CM

## 2021-03-28 ENCOUNTER — APPOINTMENT (OUTPATIENT)
Dept: DISASTER EMERGENCY | Facility: CLINIC | Age: 61
End: 2021-03-28

## 2021-03-30 ENCOUNTER — TRANSCRIPTION ENCOUNTER (OUTPATIENT)
Age: 61
End: 2021-03-30

## 2021-03-30 ENCOUNTER — OUTPATIENT (OUTPATIENT)
Dept: OUTPATIENT SERVICES | Facility: HOSPITAL | Age: 61
LOS: 1 days | End: 2021-03-30
Payer: MEDICAID

## 2021-03-30 DIAGNOSIS — Z87.448 PERSONAL HISTORY OF OTHER DISEASES OF URINARY SYSTEM: Chronic | ICD-10-CM

## 2021-03-30 DIAGNOSIS — Z89.512 ACQUIRED ABSENCE OF LEFT LEG BELOW KNEE: Chronic | ICD-10-CM

## 2021-03-30 DIAGNOSIS — Z98.890 OTHER SPECIFIED POSTPROCEDURAL STATES: Chronic | ICD-10-CM

## 2021-03-30 DIAGNOSIS — Z98.41 CATARACT EXTRACTION STATUS, RIGHT EYE: Chronic | ICD-10-CM

## 2021-03-30 DIAGNOSIS — Z01.818 ENCOUNTER FOR OTHER PREPROCEDURAL EXAMINATION: ICD-10-CM

## 2021-03-30 DIAGNOSIS — Z98.42 CATARACT EXTRACTION STATUS, LEFT EYE: Chronic | ICD-10-CM

## 2021-03-30 PROBLEM — N32.89 OTHER SPECIFIED DISORDERS OF BLADDER: Chronic | Status: ACTIVE | Noted: 2021-03-22

## 2021-03-30 PROBLEM — F32.9 MAJOR DEPRESSIVE DISORDER, SINGLE EPISODE, UNSPECIFIED: Chronic | Status: ACTIVE | Noted: 2021-03-22

## 2021-03-30 PROBLEM — M81.0 AGE-RELATED OSTEOPOROSIS WITHOUT CURRENT PATHOLOGICAL FRACTURE: Chronic | Status: ACTIVE | Noted: 2021-03-22

## 2021-03-30 PROBLEM — H26.9 UNSPECIFIED CATARACT: Chronic | Status: ACTIVE | Noted: 2021-03-22

## 2021-03-30 PROBLEM — N40.0 BENIGN PROSTATIC HYPERPLASIA WITHOUT LOWER URINARY TRACT SYMPTOMS: Chronic | Status: ACTIVE | Noted: 2021-03-22

## 2021-03-30 PROBLEM — N39.0 URINARY TRACT INFECTION, SITE NOT SPECIFIED: Chronic | Status: ACTIVE | Noted: 2021-03-22

## 2021-03-30 PROBLEM — K21.9 GASTRO-ESOPHAGEAL REFLUX DISEASE WITHOUT ESOPHAGITIS: Chronic | Status: ACTIVE | Noted: 2021-03-22

## 2021-03-30 PROBLEM — M46.20 OSTEOMYELITIS OF VERTEBRA, SITE UNSPECIFIED: Chronic | Status: ACTIVE | Noted: 2021-03-22

## 2021-03-30 LAB — SARS-COV-2 RNA SPEC QL NAA+PROBE: SIGNIFICANT CHANGE UP

## 2021-03-30 PROCEDURE — 87635 SARS-COV-2 COVID-19 AMP PRB: CPT

## 2021-03-31 ENCOUNTER — OUTPATIENT (OUTPATIENT)
Dept: OUTPATIENT SERVICES | Facility: HOSPITAL | Age: 61
LOS: 1 days | End: 2021-03-31
Payer: MEDICAID

## 2021-03-31 ENCOUNTER — APPOINTMENT (OUTPATIENT)
Dept: SURGERY | Facility: HOSPITAL | Age: 61
End: 2021-03-31
Payer: MEDICAID

## 2021-03-31 ENCOUNTER — RESULT REVIEW (OUTPATIENT)
Age: 61
End: 2021-03-31

## 2021-03-31 VITALS
SYSTOLIC BLOOD PRESSURE: 182 MMHG | WEIGHT: 117.95 LBS | DIASTOLIC BLOOD PRESSURE: 89 MMHG | HEIGHT: 66 IN | TEMPERATURE: 99 F | HEART RATE: 97 BPM | OXYGEN SATURATION: 98 % | RESPIRATION RATE: 16 BRPM

## 2021-03-31 VITALS
TEMPERATURE: 98 F | RESPIRATION RATE: 14 BRPM | DIASTOLIC BLOOD PRESSURE: 90 MMHG | SYSTOLIC BLOOD PRESSURE: 164 MMHG | HEART RATE: 81 BPM | OXYGEN SATURATION: 98 %

## 2021-03-31 DIAGNOSIS — Z98.42 CATARACT EXTRACTION STATUS, LEFT EYE: Chronic | ICD-10-CM

## 2021-03-31 DIAGNOSIS — Z01.818 ENCOUNTER FOR OTHER PREPROCEDURAL EXAMINATION: ICD-10-CM

## 2021-03-31 DIAGNOSIS — Z89.512 ACQUIRED ABSENCE OF LEFT LEG BELOW KNEE: Chronic | ICD-10-CM

## 2021-03-31 DIAGNOSIS — Z98.890 OTHER SPECIFIED POSTPROCEDURAL STATES: Chronic | ICD-10-CM

## 2021-03-31 DIAGNOSIS — M79.89 OTHER SPECIFIED SOFT TISSUE DISORDERS: ICD-10-CM

## 2021-03-31 DIAGNOSIS — Z98.41 CATARACT EXTRACTION STATUS, RIGHT EYE: Chronic | ICD-10-CM

## 2021-03-31 DIAGNOSIS — Z87.448 PERSONAL HISTORY OF OTHER DISEASES OF URINARY SYSTEM: Chronic | ICD-10-CM

## 2021-03-31 LAB
ANION GAP SERPL CALC-SCNC: 9 MMOL/L — SIGNIFICANT CHANGE UP (ref 5–17)
BUN SERPL-MCNC: 31 MG/DL — HIGH (ref 7–18)
CALCIUM SERPL-MCNC: 8.9 MG/DL — SIGNIFICANT CHANGE UP (ref 8.4–10.5)
CHLORIDE SERPL-SCNC: 102 MMOL/L — SIGNIFICANT CHANGE UP (ref 96–108)
CO2 SERPL-SCNC: 28 MMOL/L — SIGNIFICANT CHANGE UP (ref 22–31)
CREAT SERPL-MCNC: 8.36 MG/DL — HIGH (ref 0.5–1.3)
GLUCOSE BLDC GLUCOMTR-MCNC: 133 MG/DL — HIGH (ref 70–99)
GLUCOSE BLDC GLUCOMTR-MCNC: 160 MG/DL — HIGH (ref 70–99)
GLUCOSE SERPL-MCNC: 155 MG/DL — HIGH (ref 70–99)
POTASSIUM SERPL-MCNC: 5.4 MMOL/L — HIGH (ref 3.5–5.3)
POTASSIUM SERPL-SCNC: 5.4 MMOL/L — HIGH (ref 3.5–5.3)
SODIUM SERPL-SCNC: 139 MMOL/L — SIGNIFICANT CHANGE UP (ref 135–145)

## 2021-03-31 PROCEDURE — 82962 GLUCOSE BLOOD TEST: CPT

## 2021-03-31 PROCEDURE — 80048 BASIC METABOLIC PNL TOTAL CA: CPT

## 2021-03-31 PROCEDURE — 88305 TISSUE EXAM BY PATHOLOGIST: CPT | Mod: 26

## 2021-03-31 PROCEDURE — 21930 EXC BACK LES SC < 3 CM: CPT

## 2021-03-31 PROCEDURE — 11402 EXC TR-EXT B9+MARG 1.1-2 CM: CPT

## 2021-03-31 PROCEDURE — 88305 TISSUE EXAM BY PATHOLOGIST: CPT

## 2021-03-31 PROCEDURE — 36415 COLL VENOUS BLD VENIPUNCTURE: CPT

## 2021-03-31 PROCEDURE — ZZZZZ: CPT

## 2021-03-31 RX ORDER — SIMVASTATIN 20 MG/1
1 TABLET, FILM COATED ORAL
Qty: 0 | Refills: 0 | DISCHARGE

## 2021-03-31 RX ORDER — OXYCODONE AND ACETAMINOPHEN 5; 325 MG/1; MG/1
1 TABLET ORAL EVERY 6 HOURS
Refills: 0 | Status: DISCONTINUED | OUTPATIENT
Start: 2021-03-31 | End: 2021-03-31

## 2021-03-31 RX ORDER — ONDANSETRON 8 MG/1
4 TABLET, FILM COATED ORAL EVERY 6 HOURS
Refills: 0 | Status: DISCONTINUED | OUTPATIENT
Start: 2021-03-31 | End: 2021-04-07

## 2021-03-31 RX ORDER — SODIUM CHLORIDE 9 MG/ML
3 INJECTION INTRAMUSCULAR; INTRAVENOUS; SUBCUTANEOUS EVERY 8 HOURS
Refills: 0 | Status: DISCONTINUED | OUTPATIENT
Start: 2021-03-31 | End: 2021-03-31

## 2021-03-31 RX ORDER — METOCLOPRAMIDE HCL 10 MG
1 TABLET ORAL
Qty: 0 | Refills: 0 | DISCHARGE
Start: 2021-03-31

## 2021-03-31 RX ORDER — LABETALOL HCL 100 MG
10 TABLET ORAL ONCE
Refills: 0 | Status: COMPLETED | OUTPATIENT
Start: 2021-03-31 | End: 2021-03-31

## 2021-03-31 RX ORDER — CHLORHEXIDINE GLUCONATE 213 G/1000ML
1 SOLUTION TOPICAL ONCE
Refills: 0 | Status: COMPLETED | OUTPATIENT
Start: 2021-03-31 | End: 2021-03-31

## 2021-03-31 RX ORDER — METOCLOPRAMIDE HCL 10 MG
1 TABLET ORAL
Qty: 0 | Refills: 0 | DISCHARGE

## 2021-03-31 RX ORDER — METOCLOPRAMIDE HCL 10 MG
0 TABLET ORAL
Qty: 0 | Refills: 0 | DISCHARGE
Start: 2021-03-31

## 2021-03-31 RX ORDER — GABAPENTIN 400 MG/1
1 CAPSULE ORAL
Qty: 6 | Refills: 0
Start: 2021-03-31 | End: 2021-04-02

## 2021-03-31 RX ORDER — SIMVASTATIN 20 MG/1
0 TABLET, FILM COATED ORAL
Qty: 0 | Refills: 0 | DISCHARGE
Start: 2021-03-31

## 2021-03-31 RX ADMIN — CHLORHEXIDINE GLUCONATE 1 APPLICATION(S): 213 SOLUTION TOPICAL at 09:30

## 2021-03-31 RX ADMIN — Medication 10 MILLIGRAM(S): at 13:46

## 2021-03-31 RX ADMIN — SODIUM CHLORIDE 3 MILLILITER(S): 9 INJECTION INTRAMUSCULAR; INTRAVENOUS; SUBCUTANEOUS at 09:28

## 2021-03-31 NOTE — ASU PATIENT PROFILE, ADULT - VISION (WITH CORRECTIVE LENSES IF THE PATIENT USUALLY WEARS THEM):
right and left vision loss/Severely impaired: cannot locate objects without hearing or touching them or patient nonresponsive.

## 2021-03-31 NOTE — ASU PATIENT PROFILE, ADULT - PSH
Below knee amputation status, left  2012- pt is wearing prostesis  H/O hematuria  s/p bladder bx and fulguration 2/25/2020  H/O transurethral destruction of bladder lesion  2020  History of left cataract extraction    History of right cataract extraction    S/P arteriovenous (AV) fistula creation  right arm brachiocephalic arteriovenous fistula on 11/08/2018

## 2021-03-31 NOTE — ASU PATIENT PROFILE, ADULT - PMH
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

## 2021-03-31 NOTE — ASU PREOP CHECKLIST - 1.
pt. appears to be frustrated on pre-op assessment, hesitant to answer questions--reassurance provided, supportive care given.

## 2021-03-31 NOTE — ASU PATIENT PROFILE, ADULT - TEACHING/LEARNING FACTORS INFLUENCE READINESS TO LEARN
acuteness of illness/depression/requests information/interest in learning/lack of motivation/motivation to learn/nonacceptance/pain

## 2021-03-31 NOTE — ASU PREOP CHECKLIST - SELECT TESTS ORDERED
BMP sent stat preop as per NP order (drawn by MICHI Thomas)/BMP/CBC/PT/PTT/INR/EKG/CXR/Results in MD note/POCT Blood Glucose/COVID-19

## 2021-03-31 NOTE — ASU DISCHARGE PLAN (ADULT/PEDIATRIC) - CARE PROVIDER_API CALL
Tiago Nieto)  Surgery  [address] [facility], [place] [address]  Phone: [phone]  Fax: [phone]  Established Patient  Follow Up Time: 2 weeks

## 2021-04-07 LAB — SURGICAL PATHOLOGY STUDY: SIGNIFICANT CHANGE UP

## 2021-04-12 ENCOUNTER — APPOINTMENT (OUTPATIENT)
Dept: VASCULAR SURGERY | Facility: CLINIC | Age: 61
End: 2021-04-12

## 2021-04-15 PROBLEM — C44.529: Status: ACTIVE | Noted: 2021-04-15

## 2021-04-19 ENCOUNTER — APPOINTMENT (OUTPATIENT)
Dept: SURGERY | Facility: CLINIC | Age: 61
End: 2021-04-19
Payer: MEDICAID

## 2021-04-19 VITALS
OXYGEN SATURATION: 97 % | HEIGHT: 66 IN | HEART RATE: 96 BPM | SYSTOLIC BLOOD PRESSURE: 154 MMHG | DIASTOLIC BLOOD PRESSURE: 82 MMHG

## 2021-04-19 VITALS — TEMPERATURE: 95.6 F

## 2021-04-19 DIAGNOSIS — C44.529 SQUAMOUS CELL CARCINOMA OF SKIN OF OTHER PART OF TRUNK: ICD-10-CM

## 2021-04-19 DIAGNOSIS — M79.89 OTHER SPECIFIED SOFT TISSUE DISORDERS: ICD-10-CM

## 2021-04-19 PROCEDURE — 99212 OFFICE O/P EST SF 10 MIN: CPT

## 2021-04-19 NOTE — PHYSICAL EXAM
[de-identified] : right arm mass  is mobile, Firm, Smooth, tender, Well defined. Superficial. No palpable lymph nodes. Mass size - 2 cm x 2 cm. \par

## 2021-04-19 NOTE — ASSESSMENT
[FreeTextEntry1] : Impression: right arm mass\par \par Mr. VALERO is doing well, with excellent post-operative recovery. The surgical incision is healing well and as expected. There is no evidence of infection or complication, and patient is progressing as expected. Post-operative wound care, activity, restrictions and precautions reinforced.  Pathology results were discussed in details. Patient's questions and concerns addressed to patient's satisfaction.\par

## 2021-04-19 NOTE — HISTORY OF PRESENT ILLNESS
[de-identified] : Mr. VALERO  is s/p excision back mass on 03/31/2021. 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 Today  Mr. VALERO offers no complaints. patient reports no fever, chills,  or  pain.  His surgical wound is healing well. No signs of inflammation, infection or exudate.  patient is also complaining of right arm mass for 2 months. \par  He denies any fever or night sweats. Appetite is good and weight is stable.

## 2021-04-19 NOTE — PLAN
[FreeTextEntry1] : Mr. VALERO  was told significance of findings, options, risks and benefits were explained.  Informed consent for excision right arm mass and potential risks, benefits and alternatives (surgical options were discussed including non-surgical options or the option of no surgery) to the planned surgery were discussed in depth.  All surgical options were discussed including non-surgical treatments.  He wishes to proceed with surgery.  We will plan for surgery on at the next available date, pending any required insurance pre-certification or pre-approval. He agrees to obtain any necessary pre-operative evaluations and testing prior to surgery.\par Patient advised to seek immediate medical attention with any acute change in symptoms or with the development of any new or worsening symptoms.  Patient's questions and concerns addressed to patient's satisfaction, and patient verbalized an understanding of the information discussed.\par \par

## 2021-04-19 NOTE — DATA REVIEWED
[FreeTextEntry1] : KIAN VALERO                           2\par \par \par \par Surgical Final Report\par \par \par \par \par Final Diagnosis\par Back mass, excision\par - Atypical verrucous squamous proliferation, consistent with\par well differentiated squamous cell carcinoma, 0.8 cm in size,\par limited to the dermis, margins not involved\par \par This case has been reviewed and signed out at [facility]\par Dermatopathology Services, [address] [facility] [address], [place] [address].\par \par Verified by: Olayinka Sales MD\par (Electronic Signature)\par Reported on: 04/07/21 11:08 EDT, [address] [facility], [facility] [address], [address] Phone: [phone]   Fax: [phone]\par _________________________________________________________________\par \par Specimen(s) Submitted\par Back mass\par

## 2021-04-30 ENCOUNTER — OUTPATIENT (OUTPATIENT)
Dept: OUTPATIENT SERVICES | Facility: HOSPITAL | Age: 61
LOS: 1 days | End: 2021-04-30
Payer: MEDICAID

## 2021-04-30 VITALS
OXYGEN SATURATION: 97 % | WEIGHT: 117.07 LBS | DIASTOLIC BLOOD PRESSURE: 80 MMHG | HEIGHT: 66 IN | SYSTOLIC BLOOD PRESSURE: 132 MMHG | HEART RATE: 86 BPM | RESPIRATION RATE: 18 BRPM | TEMPERATURE: 98 F

## 2021-04-30 DIAGNOSIS — Z98.890 OTHER SPECIFIED POSTPROCEDURAL STATES: Chronic | ICD-10-CM

## 2021-04-30 DIAGNOSIS — M79.89 OTHER SPECIFIED SOFT TISSUE DISORDERS: ICD-10-CM

## 2021-04-30 DIAGNOSIS — Z98.41 CATARACT EXTRACTION STATUS, RIGHT EYE: Chronic | ICD-10-CM

## 2021-04-30 DIAGNOSIS — Z98.42 CATARACT EXTRACTION STATUS, LEFT EYE: Chronic | ICD-10-CM

## 2021-04-30 DIAGNOSIS — Z89.512 ACQUIRED ABSENCE OF LEFT LEG BELOW KNEE: Chronic | ICD-10-CM

## 2021-04-30 DIAGNOSIS — Z87.448 PERSONAL HISTORY OF OTHER DISEASES OF URINARY SYSTEM: Chronic | ICD-10-CM

## 2021-04-30 DIAGNOSIS — R22.31 LOCALIZED SWELLING, MASS AND LUMP, RIGHT UPPER LIMB: ICD-10-CM

## 2021-04-30 DIAGNOSIS — Z01.818 ENCOUNTER FOR OTHER PREPROCEDURAL EXAMINATION: ICD-10-CM

## 2021-04-30 PROCEDURE — ZZZZZ: CPT

## 2021-04-30 PROCEDURE — G0463: CPT

## 2021-04-30 NOTE — H&P PST ADULT - OPHTHALMOLOGIC COMMENTS
h/o diabetic retinopathy h/o diabetic retinopathy; legally blind h/o diabetic retinopathy; legally blind; glaucoma

## 2021-04-30 NOTE — H&P PST ADULT - GENITOURINARY COMMENTS
ESRD on Hemodialysis 3 times a week on Tuesdays, Thursdays and Saturdays via right arm AV fistula; h/o TURBT due to bladder mass in 2020, BPH,

## 2021-04-30 NOTE — H&P PST ADULT - NSICDXPASTSURGICALHX_GEN_ALL_CORE_FT
PAST SURGICAL HISTORY:  Below knee amputation status, left 2012- pt is wearing prostesis    H/O hematuria s/p bladder bx and fulguration 2/25/2020    H/O transurethral destruction of bladder lesion 2020    History of excision of mass back mass on 03/31/2021    History of left cataract extraction     History of right cataract extraction     S/P arteriovenous (AV) fistula creation right arm brachiocephalic arteriovenous fistula on 11/08/2018

## 2021-04-30 NOTE — H&P PST ADULT - NSICDXFAMILYHX_GEN_ALL_CORE_FT
FAMILY HISTORY:  Family history unknown     FAMILY HISTORY:  Mother  Still living? No  Family history of cirrhosis of liver, Age at diagnosis: Age Unknown     FAMILY HISTORY:  Mother  Still living? No  Family history of cirrhosis of liver, Age at diagnosis: Age Unknown    Sibling  Still living? Yes, Estimated age: Age Unknown  Family history of diabetes mellitus, Age at diagnosis: Age Unknown  Family history of hypertension, Age at diagnosis: Age Unknown  Family history of renal failure, Age at diagnosis: Age Unknown  History of substance abuse in sibling, Age at diagnosis: Age Unknown

## 2021-04-30 NOTE — H&P PST ADULT - ASSESSMENT
60 yr old male with PMH of HTN, DM, diabetic retinopathy, depression, GERD, glaucoma, osteoporosis, OA, LS spinal stenosis, PVD, hyperparathyroidism, right arm AV fistula creation in 2018 used for HD Tue , TH, Sat, s/p bladder bx, TURBT and fulguration 2020 for hematuria,  hx of UTI , x presents with assist , walking with walker ( rolling ) with c/o right arm mass. Pt is scheduled for excision of right arm mass on 05/05/2021.     60 yr old male with PMH of HTN, CAD, MI, DM, diabetic retinopathy, BKA with left prosthetic leg, legally blind, depression, GERD, glaucoma, osteoporosis, OA, contractures of hands and fingers, LS spinal stenosis, PVD, hyperparathyroidism, ESRD on Hemodialysis on Tuesdays, Thursdays and Saturdays via right arm AV fistula with right arm mass. Pt is scheduled for excision of right arm mass on 05/05/2021.

## 2021-04-30 NOTE — H&P PST ADULT - NEUROLOGICAL SYMPTOMS
paresthesias/difficulty walking c/o numbness of right plantar foot due to neuropathy/paresthesias/difficulty walking

## 2021-04-30 NOTE — H&P PST ADULT - HISTORY OF PRESENT ILLNESS
60 yr old male with PMH of  60 yr old male with PMH of HTN, DM, diabetic retinopathy, depression, GERD, glaucoma, osteoporosis, OA, LS spinal stenosis, PVD, hyperparathyroidism, , right arm AV fistula formation 2018 used for HD Tue , TH, Sat, s/p bladder bx, TURBT and fulguration 2020 for hematuria,  hx of UTI , x presents with assist , walking with walker ( rolling ) with c/o right arm mass. Pt is scheduled for excision of right arm mass on 05/05/2021.   60 yr old male with PMH of HTN, CAD, MI, DM, diabetic retinopathy, BKA with left prosthetic leg, legally blind, depression, GERD, glaucoma, osteoporosis, OA, contractures of hands and fingers, LS spinal stenosis, PVD, hyperparathyroidism, ESRD on Hemodialysis on Tuesdays, Thursdays and Saturdays via right arm AV fistula with c/o right arm mass. Pt reports trauma to area and discomfort with pressure or palpation. Pt is scheduled for excision of right arm mass on 05/05/2021.

## 2021-04-30 NOTE — H&P PST ADULT - NSICDXPROBLEM_GEN_ALL_CORE_FT
PROBLEM DIAGNOSES  Problem: Mass of arm, right  Assessment and Plan: excision of right arm on 05/05/2021. Preoperative instructions discussed with pt and given to pt. Instructed pt that no one will be allowed to come to hospital with him, to notify security when he arrives in the lobby of the hospital that he is here for surgery, that he will need someone to come to the hospital to pick him up after surgery,not to eat or drink anything after midnight the night before the surgery, to avoid NSAIDs such as Ibuprofen, Motrin, Aleve, Advil, naproxen before surgery, to take Tylenol if needed for pain, to report if he has been exposed to anyone with any contagious diseases including Covid-19 or if he is exhibiting any symptoms of COVID-19,  to keep appointment for COVID-19  test 3 days before surgery. Instructed about use of Chlorhexidine 4% soap before surgery. Verbalized understanding of instructions given.        PROBLEM DIAGNOSES  Problem: Mass of arm, right  Assessment and Plan: excision of right arm on 05/05/2021. Preoperative instructions discussed with pt and given to pt. Instructed pt that no one will be allowed to come to hospital with him, to notify security when he arrives in the lobby of the hospital that he is here for surgery, that he will need someone to come to the hospital to pick him up after surgery,not to eat or drink anything after midnight the night before the surgery, to avoid NSAIDs such as Ibuprofen, Motrin, Aleve, Advil, naproxen before surgery, to take Tylenol if needed for pain, to report if he has been exposed to anyone with any contagious diseases including Covid-19 or if he is exhibiting any symptoms of COVID-19,  to keep appointment for COVID-19  test 3 days before surgery. Instructed about use of Chlorhexidine 4% soap before surgery. Verbalized understanding of instructions given.     Problem: End-stage renal disease (ESRD)  Assessment and Plan: Pt to be dialyzed the day before the surgery. Stat BMP preop the day before the surgery. Follow-up with nephrology for management.    Problem: HTN (hypertension)  Assessment and Plan: Instructed to continue Labetalol and take with sips of water on day of surgery.  Cleared by PCP as per pt. Follow-up with PCP for management.     Problem: DM (diabetes mellitus)  Assessment and Plan: Instructed to continue regular insulin and to hold on day of surgery. Perioperative glucose monitoring and coverage as needed. Follow-up with PCP for diabetic management     Problem: GERD (gastroesophageal reflux disease)  Assessment and Plan: Instructed to continue Protonix and take with sips of water on day of surgery. Follow-up with provider for management.     Problem: HLD (hyperlipidemia)  Assessment and Plan: Instructed pt to continue Zocor and to follow-up with PCP for lipid management

## 2021-04-30 NOTE — H&P PST ADULT - NSICDXPASTMEDICALHX_GEN_ALL_CORE_FT
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    Coronary artery disease     Depression     Diabetes mellitus     Diabetic neuropathy     ESRD on hemodialysis     Glaucoma     H/O hematuria     HLD (hyperlipidemia)     Hyperparathyroidism     Hypertension     Osteoarthritis     Osteomyelitis of vertebra     Osteoporosis     Peripheral vascular disease     Spinal stenosis of lumbosacral region     UTI (urinary tract infection) hx of    Vision loss of left eye     Vision loss of right eye

## 2021-05-02 ENCOUNTER — APPOINTMENT (OUTPATIENT)
Dept: DISASTER EMERGENCY | Facility: CLINIC | Age: 61
End: 2021-05-02

## 2021-05-02 DIAGNOSIS — Z01.818 ENCOUNTER FOR OTHER PREPROCEDURAL EXAMINATION: ICD-10-CM

## 2021-05-02 LAB — SARS-COV-2 N GENE NPH QL NAA+PROBE: NOT DETECTED

## 2021-05-03 DIAGNOSIS — N18.6 END STAGE RENAL DISEASE: ICD-10-CM

## 2021-05-03 DIAGNOSIS — E11.9 TYPE 2 DIABETES MELLITUS WITHOUT COMPLICATIONS: ICD-10-CM

## 2021-05-03 DIAGNOSIS — I10 ESSENTIAL (PRIMARY) HYPERTENSION: ICD-10-CM

## 2021-05-03 DIAGNOSIS — K21.9 GASTRO-ESOPHAGEAL REFLUX DISEASE WITHOUT ESOPHAGITIS: ICD-10-CM

## 2021-05-03 DIAGNOSIS — E78.5 HYPERLIPIDEMIA, UNSPECIFIED: ICD-10-CM

## 2021-05-03 RX ORDER — PETROLATUM,WHITE
1 JELLY (GRAM) TOPICAL
Qty: 0 | Refills: 0 | DISCHARGE

## 2021-05-03 RX ORDER — LIDOCAINE 4 G/100G
1 CREAM TOPICAL
Qty: 0 | Refills: 0 | DISCHARGE

## 2021-05-03 RX ORDER — SEVELAMER CARBONATE 2400 MG/1
2 POWDER, FOR SUSPENSION ORAL
Qty: 0 | Refills: 0 | DISCHARGE

## 2021-05-03 RX ORDER — SEVELAMER CARBONATE 2400 MG/1
1 POWDER, FOR SUSPENSION ORAL
Qty: 0 | Refills: 0 | DISCHARGE

## 2021-05-03 RX ORDER — NORTRIPTYLINE HYDROCHLORIDE 10 MG/1
1 CAPSULE ORAL
Qty: 0 | Refills: 0 | DISCHARGE

## 2021-05-04 ENCOUNTER — TRANSCRIPTION ENCOUNTER (OUTPATIENT)
Age: 61
End: 2021-05-04

## 2021-05-05 ENCOUNTER — APPOINTMENT (OUTPATIENT)
Dept: SURGERY | Facility: HOSPITAL | Age: 61
End: 2021-05-05
Payer: MEDICAID

## 2021-05-05 ENCOUNTER — RESULT REVIEW (OUTPATIENT)
Age: 61
End: 2021-05-05

## 2021-05-05 ENCOUNTER — OUTPATIENT (OUTPATIENT)
Dept: OUTPATIENT SERVICES | Facility: HOSPITAL | Age: 61
LOS: 1 days | End: 2021-05-05
Payer: MEDICAID

## 2021-05-05 VITALS
OXYGEN SATURATION: 99 % | HEART RATE: 88 BPM | TEMPERATURE: 98 F | DIASTOLIC BLOOD PRESSURE: 89 MMHG | RESPIRATION RATE: 18 BRPM | SYSTOLIC BLOOD PRESSURE: 165 MMHG

## 2021-05-05 VITALS
TEMPERATURE: 99 F | SYSTOLIC BLOOD PRESSURE: 166 MMHG | RESPIRATION RATE: 17 BRPM | WEIGHT: 117.07 LBS | DIASTOLIC BLOOD PRESSURE: 83 MMHG | HEART RATE: 98 BPM | HEIGHT: 66 IN | OXYGEN SATURATION: 100 %

## 2021-05-05 DIAGNOSIS — Z98.890 OTHER SPECIFIED POSTPROCEDURAL STATES: Chronic | ICD-10-CM

## 2021-05-05 DIAGNOSIS — M79.89 OTHER SPECIFIED SOFT TISSUE DISORDERS: ICD-10-CM

## 2021-05-05 DIAGNOSIS — Z87.448 PERSONAL HISTORY OF OTHER DISEASES OF URINARY SYSTEM: Chronic | ICD-10-CM

## 2021-05-05 DIAGNOSIS — Z98.42 CATARACT EXTRACTION STATUS, LEFT EYE: Chronic | ICD-10-CM

## 2021-05-05 DIAGNOSIS — Z89.512 ACQUIRED ABSENCE OF LEFT LEG BELOW KNEE: Chronic | ICD-10-CM

## 2021-05-05 DIAGNOSIS — Z98.41 CATARACT EXTRACTION STATUS, RIGHT EYE: Chronic | ICD-10-CM

## 2021-05-05 LAB
ANION GAP SERPL CALC-SCNC: 2 MMOL/L — LOW (ref 5–17)
BUN SERPL-MCNC: 31 MG/DL — HIGH (ref 7–18)
CALCIUM SERPL-MCNC: 7.7 MG/DL — LOW (ref 8.4–10.5)
CHLORIDE SERPL-SCNC: 103 MMOL/L — SIGNIFICANT CHANGE UP (ref 96–108)
CO2 SERPL-SCNC: 33 MMOL/L — HIGH (ref 22–31)
CREAT SERPL-MCNC: 8.72 MG/DL — HIGH (ref 0.5–1.3)
GLUCOSE BLDC GLUCOMTR-MCNC: 138 MG/DL — HIGH (ref 70–99)
GLUCOSE BLDC GLUCOMTR-MCNC: 154 MG/DL — HIGH (ref 70–99)
GLUCOSE SERPL-MCNC: 142 MG/DL — HIGH (ref 70–99)
POTASSIUM SERPL-MCNC: 5.8 MMOL/L — HIGH (ref 3.5–5.3)
POTASSIUM SERPL-SCNC: 5.8 MMOL/L — HIGH (ref 3.5–5.3)
SODIUM SERPL-SCNC: 138 MMOL/L — SIGNIFICANT CHANGE UP (ref 135–145)

## 2021-05-05 PROCEDURE — 88305 TISSUE EXAM BY PATHOLOGIST: CPT | Mod: 26

## 2021-05-05 PROCEDURE — 11402 EXC TR-EXT B9+MARG 1.1-2 CM: CPT

## 2021-05-05 PROCEDURE — 88305 TISSUE EXAM BY PATHOLOGIST: CPT

## 2021-05-05 PROCEDURE — 80048 BASIC METABOLIC PNL TOTAL CA: CPT

## 2021-05-05 PROCEDURE — 12032 INTMD RPR S/A/T/EXT 2.6-7.5: CPT

## 2021-05-05 PROCEDURE — 11603 EXC TR-EXT MAL+MARG 2.1-3 CM: CPT

## 2021-05-05 PROCEDURE — 82962 GLUCOSE BLOOD TEST: CPT

## 2021-05-05 PROCEDURE — 36415 COLL VENOUS BLD VENIPUNCTURE: CPT

## 2021-05-05 RX ORDER — ONDANSETRON 8 MG/1
4 TABLET, FILM COATED ORAL EVERY 6 HOURS
Refills: 0 | Status: DISCONTINUED | OUTPATIENT
Start: 2021-05-05 | End: 2021-05-12

## 2021-05-05 RX ORDER — SODIUM CHLORIDE 9 MG/ML
3 INJECTION INTRAMUSCULAR; INTRAVENOUS; SUBCUTANEOUS EVERY 8 HOURS
Refills: 0 | Status: DISCONTINUED | OUTPATIENT
Start: 2021-05-05 | End: 2021-05-05

## 2021-05-05 RX ORDER — OXYCODONE AND ACETAMINOPHEN 5; 325 MG/1; MG/1
1 TABLET ORAL EVERY 6 HOURS
Refills: 0 | Status: DISCONTINUED | OUTPATIENT
Start: 2021-05-05 | End: 2021-05-05

## 2021-05-05 NOTE — ASU DISCHARGE PLAN (ADULT/PEDIATRIC) - CARE PROVIDER_API CALL
Discussed with patient that Dr Aguirre reviewed Stress Echo for recommendations: Normal --looks good CPM. Patient verbalizes understanding and agrees with plan.      ----- Message from Matthias Aguirre MD sent at 12/10/2020  4:30 PM CST -----  Normal --looks good CPM/smr      ----- Message -----  From: Meg Mahmood RN  Sent: 12/10/2020   1:29 PM CST  To: Matthias Aguirre MD    Message to Dr Aguirre to review Stress Echo for recommendations. No upcoming appointment.         Tiago Nieto)  Surgery  [address] [facility], [place] [address]  Phone: [phone]  Fax: [phone]  Established Patient  Follow Up Time: 2 weeks

## 2021-05-05 NOTE — BRIEF OPERATIVE NOTE - NSICDXBRIEFPROCEDURE_GEN_ALL_CORE_FT
PROCEDURES:  Surgical removal of subcutaneous neoplasm of soft tissue of upper arm, less than 3 cm 05-May-2021 12:50:53  Farrah Sanchez

## 2021-05-05 NOTE — ASU PATIENT PROFILE, ADULT - PATIENT'S HEIGHT AND WEIGHT RECORDED IN THE VITAL SIGNS FLOWSHEET
[facility]  Brief Operative Note    SUMMARY     Surgery Date: 7/6/2017     Surgeon(s) and Role:     * Michael Joseph Casale, MD - Resident - Assisting     * Jair Winchester MD - Primary      Pre-op Diagnosis:  Closed left trimalleolar ankle fracture, initial encounter [S82.144P]    Post-op Diagnosis: Closed left trimalleolar ankle fracture, initial encounter [S82.172M]    Procedure(s) (LRB):  APPLICATION-EXTERNAL FIXATION DEVICE (Left)    Anesthesia: MAC    Description of Procedure: closed reduction and external fixation of left trimalleolar ankle fracture. Good alignment achieved.    Estimated Blood Loss: Minimal         Specimens:   Specimen (12h ago through future)    None         yes

## 2021-05-10 LAB — SURGICAL PATHOLOGY STUDY: SIGNIFICANT CHANGE UP

## 2021-05-10 NOTE — ED ADULT NURSE NOTE - NS ED NURSE RECORD ANOTHER HT AND WT
Yes No Repair - Repaired With Adjacent Surgical Defect Text (Leave Blank If You Do Not Want): After obtaining clear surgical margins the defect was repaired concurrently with another surgical defect which was in close approximation.

## 2021-05-24 ENCOUNTER — APPOINTMENT (OUTPATIENT)
Dept: VASCULAR SURGERY | Facility: CLINIC | Age: 61
End: 2021-05-24
Payer: MEDICAID

## 2021-05-24 PROCEDURE — 99213 OFFICE O/P EST LOW 20 MIN: CPT

## 2021-05-24 PROCEDURE — 93990 DOPPLER FLOW TESTING: CPT

## 2021-05-24 NOTE — HISTORY OF PRESENT ILLNESS
[] : right brachiocephalic fistula [FreeTextEntry1] : 59 yo male with history of esrd on hd presents for follow up \par pt denies any issues with hd \par

## 2021-05-24 NOTE — DISCUSSION/SUMMARY
[FreeTextEntry1] : 59 yo male with history of esrd on hd presents for follow up \par \par duplex shows patent avf with 50-75% stenosis of the juxta anastomosis and in stent stenosis of 50-75% proximal to the cephalic subclavian junction with flow rate of 1412 \par \par will continue to monitor given no issues with hd \par pt to follow up in 3-4 months with repeat duplex\par

## 2021-05-24 NOTE — PHYSICAL EXAM
[Thrill] : thrill [Aneurysm] : aneurysm [Hand well perfused] : hand well perfused [Pulsatile Thrill] : no pulsatile thrill [Bleeding] : no bleeding [Ulcer] : no ulcer [Normal] : normoactive bowel sounds, soft and nontender, no hepatosplenomegaly or masses appreciated [de-identified] : intact

## 2021-06-10 NOTE — CONSULT NOTE ADULT - ENDOCRINE
Talk to Pt that per Dr Cortes Pt needs an appointment for med check before any more med refills. Pt will call back to make an appointment.   negative

## 2021-09-11 NOTE — PHYSICAL THERAPY INITIAL EVALUATION ADULT - PASSIVE RANGE OF MOTION EXAMINATION, REHAB EVAL
4
bilateral upper extremity Passive ROM was WFL (within functional limits)/bilateral lower extremity Passive ROM was WFL (within functional limits)

## 2021-09-20 ENCOUNTER — APPOINTMENT (OUTPATIENT)
Dept: VASCULAR SURGERY | Facility: CLINIC | Age: 61
End: 2021-09-20

## 2021-09-28 NOTE — ASU DISCHARGE PLAN (ADULT/PEDIATRIC) - FOR NEXT 24 HOURS DO NOT:
Patient would like to discuss with clinical.  Patient does not know who  is   Requesting clinical    Statement Selected

## 2021-10-04 ENCOUNTER — APPOINTMENT (OUTPATIENT)
Dept: VASCULAR SURGERY | Facility: CLINIC | Age: 61
End: 2021-10-04

## 2021-10-25 ENCOUNTER — APPOINTMENT (OUTPATIENT)
Dept: VASCULAR SURGERY | Facility: CLINIC | Age: 61
End: 2021-10-25
Payer: MEDICAID

## 2021-10-25 PROCEDURE — 93990 DOPPLER FLOW TESTING: CPT

## 2021-10-25 PROCEDURE — 99213 OFFICE O/P EST LOW 20 MIN: CPT

## 2021-10-25 NOTE — PHYSICAL EXAM
[Thrill] : thrill [Pulsatile Thrill] : no pulsatile thrill [Aneurysm] : aneurysm [Bleeding] : no bleeding [Hand well perfused] : hand well perfused [Ulcer] : no ulcer [Normal] : coordination grossly intact, no focal deficits [de-identified] : intact

## 2021-10-25 NOTE — DISCUSSION/SUMMARY
[FreeTextEntry1] : 59 yo male with history of esrd on hd presents for follow up \par \par \par will continue to monitor given no issues with hd \par pt to follow up in 3-4 months with repeat duplex\par

## 2021-10-25 NOTE — HISTORY OF PRESENT ILLNESS
[FreeTextEntry1] : 59 yo male with history of esrd on hd presents for follow up \par pt denies any issues with hd \par  [] : right brachiocephalic fistula

## 2021-10-30 NOTE — H&P ADULT - PROBLEM SELECTOR PLAN 3
- Patient takes regular insulin sliding scale at [place]  - will start Admelog sliding scale  - f/u HgA1c (5.9% on 12/28/20)  - diabetic diet Unable to Assess

## 2021-11-07 ENCOUNTER — INPATIENT (INPATIENT)
Facility: HOSPITAL | Age: 61
LOS: 3 days | Discharge: NURSING, REHAB OR LONG-TERM CARE | DRG: 368 | End: 2021-11-11
Attending: INTERNAL MEDICINE | Admitting: INTERNAL MEDICINE
Payer: MEDICAID

## 2021-11-07 VITALS
OXYGEN SATURATION: 95 % | HEART RATE: 108 BPM | DIASTOLIC BLOOD PRESSURE: 83 MMHG | WEIGHT: 138.01 LBS | RESPIRATION RATE: 20 BRPM | TEMPERATURE: 97 F | HEIGHT: 66 IN | SYSTOLIC BLOOD PRESSURE: 159 MMHG

## 2021-11-07 DIAGNOSIS — Z98.41 CATARACT EXTRACTION STATUS, RIGHT EYE: Chronic | ICD-10-CM

## 2021-11-07 DIAGNOSIS — Z98.42 CATARACT EXTRACTION STATUS, LEFT EYE: Chronic | ICD-10-CM

## 2021-11-07 DIAGNOSIS — Z98.890 OTHER SPECIFIED POSTPROCEDURAL STATES: Chronic | ICD-10-CM

## 2021-11-07 DIAGNOSIS — Z87.448 PERSONAL HISTORY OF OTHER DISEASES OF URINARY SYSTEM: Chronic | ICD-10-CM

## 2021-11-07 DIAGNOSIS — Z89.512 ACQUIRED ABSENCE OF LEFT LEG BELOW KNEE: Chronic | ICD-10-CM

## 2021-11-07 LAB
ALBUMIN SERPL ELPH-MCNC: 3.7 G/DL — SIGNIFICANT CHANGE UP (ref 3.5–5)
ALP SERPL-CCNC: 76 U/L — SIGNIFICANT CHANGE UP (ref 40–120)
ALT FLD-CCNC: 19 U/L DA — SIGNIFICANT CHANGE UP (ref 10–60)
ANION GAP SERPL CALC-SCNC: 8 MMOL/L — SIGNIFICANT CHANGE UP (ref 5–17)
AST SERPL-CCNC: 22 U/L — SIGNIFICANT CHANGE UP (ref 10–40)
BASOPHILS # BLD AUTO: 0.02 K/UL — SIGNIFICANT CHANGE UP (ref 0–0.2)
BASOPHILS NFR BLD AUTO: 0.2 % — SIGNIFICANT CHANGE UP (ref 0–2)
BILIRUB SERPL-MCNC: 0.8 MG/DL — SIGNIFICANT CHANGE UP (ref 0.2–1.2)
BUN SERPL-MCNC: 25 MG/DL — HIGH (ref 7–18)
CALCIUM SERPL-MCNC: 8.5 MG/DL — SIGNIFICANT CHANGE UP (ref 8.4–10.5)
CHLORIDE SERPL-SCNC: 104 MMOL/L — SIGNIFICANT CHANGE UP (ref 96–108)
CO2 SERPL-SCNC: 29 MMOL/L — SIGNIFICANT CHANGE UP (ref 22–31)
CREAT SERPL-MCNC: 8.89 MG/DL — HIGH (ref 0.5–1.3)
EOSINOPHIL # BLD AUTO: 0.05 K/UL — SIGNIFICANT CHANGE UP (ref 0–0.5)
EOSINOPHIL NFR BLD AUTO: 0.6 % — SIGNIFICANT CHANGE UP (ref 0–6)
GLUCOSE SERPL-MCNC: 112 MG/DL — HIGH (ref 70–99)
HCT VFR BLD CALC: 37.8 % — LOW (ref 39–50)
HGB BLD-MCNC: 11.7 G/DL — LOW (ref 13–17)
IMM GRANULOCYTES NFR BLD AUTO: 0.3 % — SIGNIFICANT CHANGE UP (ref 0–1.5)
LACTATE SERPL-SCNC: 0.9 MMOL/L — SIGNIFICANT CHANGE UP (ref 0.7–2)
LIDOCAIN IGE QN: 64 U/L — LOW (ref 73–393)
LYMPHOCYTES # BLD AUTO: 0.2 K/UL — LOW (ref 1–3.3)
LYMPHOCYTES # BLD AUTO: 2.2 % — LOW (ref 13–44)
MCHC RBC-ENTMCNC: 27.5 PG — SIGNIFICANT CHANGE UP (ref 27–34)
MCHC RBC-ENTMCNC: 31 GM/DL — LOW (ref 32–36)
MCV RBC AUTO: 88.7 FL — SIGNIFICANT CHANGE UP (ref 80–100)
MONOCYTES # BLD AUTO: 0.37 K/UL — SIGNIFICANT CHANGE UP (ref 0–0.9)
MONOCYTES NFR BLD AUTO: 4.1 % — SIGNIFICANT CHANGE UP (ref 2–14)
NEUTROPHILS # BLD AUTO: 8.39 K/UL — HIGH (ref 1.8–7.4)
NEUTROPHILS NFR BLD AUTO: 92.6 % — HIGH (ref 43–77)
NRBC # BLD: 0 /100 WBCS — SIGNIFICANT CHANGE UP (ref 0–0)
PLATELET # BLD AUTO: 103 K/UL — LOW (ref 150–400)
POTASSIUM SERPL-MCNC: 4.4 MMOL/L — SIGNIFICANT CHANGE UP (ref 3.5–5.3)
POTASSIUM SERPL-SCNC: 4.4 MMOL/L — SIGNIFICANT CHANGE UP (ref 3.5–5.3)
PROT SERPL-MCNC: 7.2 G/DL — SIGNIFICANT CHANGE UP (ref 6–8.3)
RBC # BLD: 4.26 M/UL — SIGNIFICANT CHANGE UP (ref 4.2–5.8)
RBC # FLD: 20.4 % — HIGH (ref 10.3–14.5)
SARS-COV-2 RNA SPEC QL NAA+PROBE: SIGNIFICANT CHANGE UP
SODIUM SERPL-SCNC: 141 MMOL/L — SIGNIFICANT CHANGE UP (ref 135–145)
WBC # BLD: 9.06 K/UL — SIGNIFICANT CHANGE UP (ref 3.8–10.5)
WBC # FLD AUTO: 9.06 K/UL — SIGNIFICANT CHANGE UP (ref 3.8–10.5)

## 2021-11-07 PROCEDURE — 99285 EMERGENCY DEPT VISIT HI MDM: CPT

## 2021-11-07 PROCEDURE — 71045 X-RAY EXAM CHEST 1 VIEW: CPT | Mod: 26

## 2021-11-07 RX ORDER — DEXTROSE 50 % IN WATER 50 %
50 SYRINGE (ML) INTRAVENOUS ONCE
Refills: 0 | Status: COMPLETED | OUTPATIENT
Start: 2021-11-07 | End: 2021-11-07

## 2021-11-07 RX ORDER — PANTOPRAZOLE SODIUM 20 MG/1
8 TABLET, DELAYED RELEASE ORAL
Qty: 80 | Refills: 0 | Status: DISCONTINUED | OUTPATIENT
Start: 2021-11-07 | End: 2021-11-08

## 2021-11-07 RX ORDER — PANTOPRAZOLE SODIUM 20 MG/1
40 TABLET, DELAYED RELEASE ORAL ONCE
Refills: 0 | Status: COMPLETED | OUTPATIENT
Start: 2021-11-07 | End: 2021-11-07

## 2021-11-07 RX ORDER — ONDANSETRON 8 MG/1
4 TABLET, FILM COATED ORAL ONCE
Refills: 0 | Status: DISCONTINUED | OUTPATIENT
Start: 2021-11-07 | End: 2021-11-11

## 2021-11-07 RX ORDER — ONDANSETRON 8 MG/1
4 TABLET, FILM COATED ORAL ONCE
Refills: 0 | Status: COMPLETED | OUTPATIENT
Start: 2021-11-07 | End: 2021-11-07

## 2021-11-07 RX ADMIN — PANTOPRAZOLE SODIUM 40 MILLIGRAM(S): 20 TABLET, DELAYED RELEASE ORAL at 22:39

## 2021-11-07 RX ADMIN — Medication 50 MILLILITER(S): at 23:16

## 2021-11-07 RX ADMIN — ONDANSETRON 4 MILLIGRAM(S): 8 TABLET, FILM COATED ORAL at 22:39

## 2021-11-07 NOTE — ED PROVIDER NOTE - OBJECTIVE STATEMENT
60 y/o M with a significant PMHx of DM, HTN, GERD, CAD, ESRD, on hemodialysis, presents to the ED with hematemesis. Patient reports he started vomiting earlier this afternoon. Patient also notes intermittent cough for a while. Denies abdominal pain, diarrhea, fevers at home or any other acute complaint. Patient reports he does not urinate. 62 y/o M with a significant PMHx of DM, HTN, GERD, CAD, ESRD, on hemodialysis, presents to the ED with hematemesis. Patient reports he started vomiting earlier this afternoon. Patient also notes intermittent cough for a while. Denies abdominal pain, diarrhea, fevers at home or any other acute complaint. Patient reports he does not urinate. Last HD was Saturday 11/6/21 per patient.

## 2021-11-07 NOTE — ED PROVIDER NOTE - PHYSICAL EXAMINATION
R upper arm fistula with good thrill.    Patient with LLE prosthesis. R upper arm fistula with good thrill.  patient refused rectal exam.    Patient with LLE prosthesis.

## 2021-11-07 NOTE — ED PROVIDER NOTE - CLINICAL SUMMARY MEDICAL DECISION MAKING FREE TEXT BOX
60 y/o M with a significant PMHx of DM, HTN, GERD, CAD, ESRD, on hemodialysis, presents to the ED with hematemesis. Will obtain EKG, labs, CXR, CT abdomen, will consider with contrast, given Zofran and Protonix. Will reassess. 62 y/o M with a significant PMHx of DM, HTN, GERD, CAD, ESRD, on hemodialysis, presents to the ED with hematemesis. Will obtain EKG, labs, CXR, CT abdomen, will consider with contrast, given Zofran and Protonix. Will reassess.    labs show H/H 11/37, K 4.4-hemolyzed CR 8  FSG 67-given D50 IV while vomiting  CXR shows no focal deficits  Discussed above with Dr. Rosa SYKES who agrees with protonix infusion.  Discussed above with Dr. De La Torre who agrees with plan for admission.

## 2021-11-07 NOTE — ED ADULT TRIAGE NOTE - BRAND OF COVID-19 VACCINATION
do not know  what pt seen and examined MD/ACP . Pt swab for COVID 19 and discharge  by MD/ACP vaccine/Other

## 2021-11-07 NOTE — ED ADULT TRIAGE NOTE - ARRIVAL INFO ADDITIONAL COMMENTS
Problem: Suicide  Goal: *STG: Remains safe in hospital  Outcome: Progressing Towards Goal  Goal: *STG: Seeks staff when feelings of self harm or harm towards others arise  Outcome: Progressing Towards Goal  Goal: *STG: Attends activities and groups  Outcome: Progressing Towards Goal  Goal: *STG:  Verbalizes alternative ways of dealing with maladaptive feelings/behaviors  Outcome: Progressing Towards Goal  Goal: *STG/LTG: Complies with medication therapy  Outcome: Progressing Towards Goal     Problem: Suicide  Goal: *STG/LTG: No longer expresses self destructive or suicidal thoughts  Outcome: Not Progressing Towards Goal [facility]

## 2021-11-07 NOTE — ED ADULT NURSE NOTE - NSIMPLEMENTINTERV_GEN_ALL_ED
Implemented All Fall Risk Interventions:  [place] to call system. Call bell, personal items and telephone within reach. Instruct patient to call for assistance. Room bathroom lighting operational. Non-slip footwear when patient is off stretcher. Physically safe environment: no spills, clutter or unnecessary equipment. Stretcher in lowest position, wheels locked, appropriate side rails in place. Provide visual cue, wrist band, yellow gown, etc. Monitor gait and stability. Monitor for mental status changes and reorient to person, place, and time. Review medications for side effects contributing to fall risk. Reinforce activity limits and safety measures with patient and family.

## 2021-11-08 DIAGNOSIS — K92.0 HEMATEMESIS: ICD-10-CM

## 2021-11-08 DIAGNOSIS — E78.5 HYPERLIPIDEMIA, UNSPECIFIED: ICD-10-CM

## 2021-11-08 DIAGNOSIS — N18.6 END STAGE RENAL DISEASE: ICD-10-CM

## 2021-11-08 DIAGNOSIS — D64.9 ANEMIA, UNSPECIFIED: ICD-10-CM

## 2021-11-08 DIAGNOSIS — Z29.9 ENCOUNTER FOR PROPHYLACTIC MEASURES, UNSPECIFIED: ICD-10-CM

## 2021-11-08 DIAGNOSIS — I10 ESSENTIAL (PRIMARY) HYPERTENSION: ICD-10-CM

## 2021-11-08 DIAGNOSIS — E11.9 TYPE 2 DIABETES MELLITUS WITHOUT COMPLICATIONS: ICD-10-CM

## 2021-11-08 LAB
GLUCOSE BLDC GLUCOMTR-MCNC: 104 MG/DL — HIGH (ref 70–99)
GLUCOSE BLDC GLUCOMTR-MCNC: 110 MG/DL — HIGH (ref 70–99)
GLUCOSE BLDC GLUCOMTR-MCNC: 67 MG/DL — LOW (ref 70–99)

## 2021-11-08 PROCEDURE — 99252 IP/OBS CONSLTJ NEW/EST SF 35: CPT

## 2021-11-08 RX ORDER — SIMVASTATIN 20 MG/1
40 TABLET, FILM COATED ORAL AT BEDTIME
Refills: 0 | Status: DISCONTINUED | OUTPATIENT
Start: 2021-11-08 | End: 2021-11-11

## 2021-11-08 RX ORDER — INSULIN LISPRO 100/ML
VIAL (ML) SUBCUTANEOUS EVERY 6 HOURS
Refills: 0 | Status: DISCONTINUED | OUTPATIENT
Start: 2021-11-08 | End: 2021-11-10

## 2021-11-08 RX ORDER — ASPIRIN/CALCIUM CARB/MAGNESIUM 324 MG
81 TABLET ORAL DAILY
Refills: 0 | Status: DISCONTINUED | OUTPATIENT
Start: 2021-11-08 | End: 2021-11-11

## 2021-11-08 RX ORDER — PANTOPRAZOLE SODIUM 20 MG/1
1 TABLET, DELAYED RELEASE ORAL
Qty: 0 | Refills: 0 | DISCHARGE

## 2021-11-08 RX ORDER — LABETALOL HCL 100 MG
1 TABLET ORAL
Qty: 0 | Refills: 0 | DISCHARGE

## 2021-11-08 RX ORDER — SEVELAMER CARBONATE 2400 MG/1
2 POWDER, FOR SUSPENSION ORAL
Qty: 0 | Refills: 0 | DISCHARGE

## 2021-11-08 RX ORDER — DEXTROSE 50 % IN WATER 50 %
15 SYRINGE (ML) INTRAVENOUS ONCE
Refills: 0 | Status: COMPLETED | OUTPATIENT
Start: 2021-11-08 | End: 2021-11-08

## 2021-11-08 RX ORDER — MUPIROCIN 20 MG/G
1 OINTMENT TOPICAL
Qty: 0 | Refills: 0 | DISCHARGE

## 2021-11-08 RX ORDER — NORTRIPTYLINE HYDROCHLORIDE 10 MG/1
10 CAPSULE ORAL AT BEDTIME
Refills: 0 | Status: DISCONTINUED | OUTPATIENT
Start: 2021-11-08 | End: 2021-11-11

## 2021-11-08 RX ORDER — CHOLECALCIFEROL (VITAMIN D3) 125 MCG
1000 CAPSULE ORAL DAILY
Refills: 0 | Status: DISCONTINUED | OUTPATIENT
Start: 2021-11-08 | End: 2021-11-11

## 2021-11-08 RX ORDER — AMLODIPINE BESYLATE 2.5 MG/1
10 TABLET ORAL DAILY
Refills: 0 | Status: DISCONTINUED | OUTPATIENT
Start: 2021-11-08 | End: 2021-11-11

## 2021-11-08 RX ORDER — FOLIC ACID 0.8 MG
1 TABLET ORAL DAILY
Refills: 0 | Status: DISCONTINUED | OUTPATIENT
Start: 2021-11-08 | End: 2021-11-11

## 2021-11-08 RX ORDER — PETROLATUM,WHITE
1 JELLY (GRAM) TOPICAL
Qty: 0 | Refills: 0 | DISCHARGE

## 2021-11-08 RX ORDER — ACETAMINOPHEN 500 MG
650 TABLET ORAL EVERY 6 HOURS
Refills: 0 | Status: DISCONTINUED | OUTPATIENT
Start: 2021-11-08 | End: 2021-11-11

## 2021-11-08 RX ORDER — CHOLECALCIFEROL (VITAMIN D3) 125 MCG
1 CAPSULE ORAL
Qty: 0 | Refills: 0 | DISCHARGE

## 2021-11-08 RX ORDER — NORTRIPTYLINE HYDROCHLORIDE 10 MG/1
1 CAPSULE ORAL
Qty: 0 | Refills: 0 | DISCHARGE

## 2021-11-08 RX ORDER — HYDRALAZINE HCL 50 MG
50 TABLET ORAL THREE TIMES A DAY
Refills: 0 | Status: DISCONTINUED | OUTPATIENT
Start: 2021-11-08 | End: 2021-11-11

## 2021-11-08 RX ORDER — FERROUS SULFATE 325(65) MG
1 TABLET ORAL
Qty: 0 | Refills: 0 | DISCHARGE

## 2021-11-08 RX ORDER — METOCLOPRAMIDE HCL 10 MG
5 TABLET ORAL THREE TIMES A DAY
Refills: 0 | Status: DISCONTINUED | OUTPATIENT
Start: 2021-11-08 | End: 2021-11-11

## 2021-11-08 RX ORDER — PANTOPRAZOLE SODIUM 20 MG/1
40 TABLET, DELAYED RELEASE ORAL EVERY 12 HOURS
Refills: 0 | Status: DISCONTINUED | OUTPATIENT
Start: 2021-11-08 | End: 2021-11-09

## 2021-11-08 RX ORDER — PANTOPRAZOLE SODIUM 20 MG/1
40 TABLET, DELAYED RELEASE ORAL
Refills: 0 | Status: DISCONTINUED | OUTPATIENT
Start: 2021-11-08 | End: 2021-11-08

## 2021-11-08 RX ORDER — GLUCAGON INJECTION, SOLUTION 0.5 MG/.1ML
1 INJECTION, SOLUTION SUBCUTANEOUS
Qty: 0 | Refills: 0 | DISCHARGE

## 2021-11-08 RX ORDER — OXYCODONE AND ACETAMINOPHEN 5; 325 MG/1; MG/1
1 TABLET ORAL EVERY 8 HOURS
Refills: 0 | Status: DISCONTINUED | OUTPATIENT
Start: 2021-11-08 | End: 2021-11-11

## 2021-11-08 RX ORDER — SUCRALFATE 1 G
1 TABLET ORAL
Refills: 0 | Status: DISCONTINUED | OUTPATIENT
Start: 2021-11-08 | End: 2021-11-10

## 2021-11-08 RX ORDER — INSULIN HUMAN 100 [IU]/ML
1 INJECTION, SOLUTION SUBCUTANEOUS
Qty: 0 | Refills: 0 | DISCHARGE

## 2021-11-08 RX ORDER — FOLIC ACID 0.8 MG
1 TABLET ORAL
Qty: 0 | Refills: 0 | DISCHARGE

## 2021-11-08 RX ORDER — ALPRAZOLAM 0.25 MG
0.5 TABLET ORAL AT BEDTIME
Refills: 0 | Status: DISCONTINUED | OUTPATIENT
Start: 2021-11-08 | End: 2021-11-11

## 2021-11-08 RX ORDER — SEVELAMER CARBONATE 2400 MG/1
2400 POWDER, FOR SUSPENSION ORAL
Refills: 0 | Status: DISCONTINUED | OUTPATIENT
Start: 2021-11-08 | End: 2021-11-11

## 2021-11-08 RX ORDER — ZOLPIDEM TARTRATE 10 MG/1
5 TABLET ORAL AT BEDTIME
Refills: 0 | Status: DISCONTINUED | OUTPATIENT
Start: 2021-11-08 | End: 2021-11-11

## 2021-11-08 RX ORDER — LABETALOL HCL 100 MG
100 TABLET ORAL
Refills: 0 | Status: DISCONTINUED | OUTPATIENT
Start: 2021-11-08 | End: 2021-11-11

## 2021-11-08 RX ADMIN — Medication 650 MILLIGRAM(S): at 22:50

## 2021-11-08 RX ADMIN — NORTRIPTYLINE HYDROCHLORIDE 10 MILLIGRAM(S): 10 CAPSULE ORAL at 21:51

## 2021-11-08 RX ADMIN — Medication 5 MILLIGRAM(S): at 21:51

## 2021-11-08 RX ADMIN — PANTOPRAZOLE SODIUM 10 MG/HR: 20 TABLET, DELAYED RELEASE ORAL at 00:32

## 2021-11-08 RX ADMIN — Medication 0.5 MILLIGRAM(S): at 21:55

## 2021-11-08 RX ADMIN — PANTOPRAZOLE SODIUM 40 MILLIGRAM(S): 20 TABLET, DELAYED RELEASE ORAL at 19:04

## 2021-11-08 RX ADMIN — Medication 15 GRAM(S): at 19:03

## 2021-11-08 RX ADMIN — Medication 1 GRAM(S): at 19:04

## 2021-11-08 RX ADMIN — Medication 81 MILLIGRAM(S): at 21:51

## 2021-11-08 RX ADMIN — Medication 50 MILLIGRAM(S): at 21:56

## 2021-11-08 RX ADMIN — Medication 650 MILLIGRAM(S): at 21:56

## 2021-11-08 RX ADMIN — SIMVASTATIN 40 MILLIGRAM(S): 20 TABLET, FILM COATED ORAL at 21:51

## 2021-11-08 NOTE — H&P ADULT - PROBLEM SELECTOR PLAN 4
f/u A1c  On insulin regular sliding scale at home  sliding scale  Adjust insulin as indicated  FS q6 while NPO  refusing POCT - last POCT 60 given glucose 40% solution f/u A1c  On insulin regular sliding scale at home  sliding scale  Adjust insulin as indicated  FS q6 while NPO, achs while on diet  refusing POCT - last POCT 60 given glucose 40% solution

## 2021-11-08 NOTE — H&P ADULT - NSHPPHYSICALEXAM_GEN_ALL_CORE
General - NAD, sitting up in bed, well groomed  Eyes - PERRLA, EOM intact  ENT - Nonicteric sclerae, PERRLA, EOMI. Oropharynx clear. Moist mucous membranes. Conjunctivae appear well perfused.   Neck - No noticeable or palpable swelling, redness or rash around throat or on face  Cardiovascular - RRR no m/r/g, no JVD, no carotid bruits  Lungs - Clear to ascultation, no use of accessory muscles, no crackles or wheezes.  Skin - No rashes, skin warm and dry, no erythematous areas  Abdomen - Normal bowel sounds, abdomen soft and nontender  Rectal – Rectal exam not performed since no symptoms indicated blood loss.  Extremities - No edema, cyanosis or clubbing  Musculoskeletal - moving all extremities spontaneously, (+) L BKANeuro– Alert and oriented x 3, CN 2-12 grossly intact.

## 2021-11-08 NOTE — CONSULT NOTE ADULT - PROBLEM SELECTOR RECOMMENDATION 9
H/H stable  s/p EGD January showing severe grade D esophagitis for which liquid carafate was prescribed  monitor CBC  c/w PPI H/H stable  s/p EGD January showing severe grade D esophagitis for which liquid carafate was prescribed  monitor CBC  c/w PPI  NPO for EGD today H/H stable  s/p EGD in January showing severe grade D esophagitis for which liquid Carafate was prescribed  patient refused EGD today   monitor CBC  PPI BID  add liquid Carafate 1 gm QID

## 2021-11-08 NOTE — H&P ADULT - PROBLEM SELECTOR PLAN 1
p/w hematemesis  refusing protonix gtt, refusing protonix ivp  started on protonix solution bid 40  GI dr Molina  Pt refusing EGD  monitor cbc when possible  hemodynamically stable currently  NPO for now, advance diet as per GI p/w hematemesis w/ history of esophagitis  refusing protonix gtt, refusing protonix ivp  started on protonix solution bid 40  GI dr Molina  Pt refusing EGD  monitor cbc when possible  hemodynamically stable currently  started on clears due to hypoglycemia episodes  ***PRIMARY TEAM PLEASE CONSULT PSYCH IN AM, and update brother Georgi King [phone] (unable to reach him) on patient's refusal of all IV meds, refusal of EGD, refusal of blood work***

## 2021-11-08 NOTE — H&P ADULT - NSICDXFAMILYHX_GEN_ALL_CORE_FT
FAMILY HISTORY:  Mother  Still living? No  Family history of cirrhosis of liver, Age at diagnosis: Age Unknown    Sibling  Still living? Yes, Estimated age: Age Unknown  Family history of diabetes mellitus, Age at diagnosis: Age Unknown  Family history of hypertension, Age at diagnosis: Age Unknown  Family history of renal failure, Age at diagnosis: Age Unknown  History of substance abuse in sibling, Age at diagnosis: Age Unknown

## 2021-11-08 NOTE — H&P ADULT - HISTORY OF PRESENT ILLNESS
61M w/ PMHx of DM, HTN, GERD, CAD, ESRD on hemodialysis, from [facility] living, [facility] L [facility] presents to the ED with hematemesis. Patient reports he started vomiting blood 11/7 afternoon. Patient also notes intermittent cough for a while. Denies abdominal pain, diarrhea, fevers at home or any other acute complaint. Patient reports he does not urinate. Last HD was Saturday 11/6/21 per patient. Pt refused to be interviewed in the ED and stated that he felt fine and would like to go home. Pt refusing all IV medications. Pt refused EGD and all blood draws. No fever, chills. No abdominal pain, chest pain.

## 2021-11-08 NOTE — CONSULT NOTE ADULT - SUBJECTIVE AND OBJECTIVE BOX
[facility] : Progress Note :: [phone], (office [phone]),   Dr Mosher / Dr Washington / Dr Young / Dr Doll / Dr Varsha TURCIOS / Dr Tello / Dr Garcia / Dr Larry qiu  _____________________________________________________________________________________________  Patient is a 61y Male whom presented to the hospital with vomitting blood.  He has ESRD on HD at [facility]. He has poor compliance with HD. frequently misses HD and does not complete prescribed time.  in ED noted with pulmonary edema on CXR     PAST MEDICAL & SURGICAL HISTORY:  Hypertension    Adrenal insufficiency    Anemia    Glaucoma    Coronary artery disease    HLD (hyperlipidemia)    Peripheral vascular disease    Spinal stenosis of lumbosacral region    Hyperparathyroidism    Diabetes mellitus    Diabetic neuropathy    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    Osteoarthritis    Vision loss of left eye    ESRD on hemodialysis    Cataract  both eyes - hx of sx done    BPH (benign prostatic hyperplasia)    UTI (urinary tract infection)  hx of    Bladder mass  hx of    H/O hematuria    Osteoporosis    Vision loss of right eye    Depression    Chronic GERD    Osteomyelitis of vertebra    Below knee amputation status, left  2012- pt is wearing prostesis    History of right cataract extraction    History of left cataract extraction    S/P arteriovenous (AV) fistula creation  right arm brachiocephalic arteriovenous fistula on 11/08/2018    H/O hematuria  s/p bladder bx and fulguration 2/25/2020    H/O transurethral destruction of bladder lesion  2020    History of excision of mass  back mass on 03/31/2021      fish (Rash)  liver (Anaphylaxis)  No Known Drug Allergies    Home Medications Reviewed  Hospital Medications:   MEDICATIONS  (STANDING):  insulin lispro (ADMELOG) corrective regimen sliding scale   SubCutaneous every 6 hours  pantoprazole  Injectable 40 milliGRAM(s) IV Push two times a day  sucralfate 1 Gram(s) Oral four times a day    SOCIAL HISTORY:  Denies ETOh,Smoking,   FAMILY HISTORY:  Family history of cirrhosis of liver (Mother)    Family history of renal failure (Sibling)    Family history of hypertension (Sibling)    Family history of diabetes mellitus (Sibling)    History of substance abuse in sibling (Sibling)          VITALS:  T(F): 99.5 (11-08-21 @ 11:30), Max: 99.5 (11-08-21 @ 11:30)  HR: 95 (11-08-21 @ 11:30)  BP: 152/74 (11-08-21 @ 11:30)  RR: 18 (11-08-21 @ 11:30)  SpO2: 96% (11-08-21 @ 11:30)  Wt(kg): --    Height (cm): 167.6 (11-07 @ 22:01)  Weight (kg): 62.6 (11-07 @ 22:01)  BMI (kg/m2): 22.3 (11-07 @ 22:01)  BSA (m2): 1.71 (11-07 @ 22:01)  PHYSICAL EXAM:  Constitutional: NAD  HEENT: anicteric sclera, oropharynx clear, MMM  Neck: No JVD  Respiratory: CTAB, no wheezes, rales or rhonchi  Cardiovascular: S1, S2, RRR  Gastrointestinal: BS+, soft, NT/ND  Extremities:  No peripheral edema  Neurological: A/O x 3, no focal deficits  : No CVA tenderness. No [place].   Skin: No rashes  Vascular Access: RUEAVF with thrill  and bruit    LABS:  11-07    141  |  104  |  25<H>  ----------------------------<  112<H>  4.4   |  29  |  8.89<H>    Ca    8.5      07 Nov 2021 22:54    TPro  7.2  /  Alb  3.7  /  TBili  0.8  /  DBili      /  AST  22  /  ALT  19  /  AlkPhos  76  11-07    Creatinine Trend: 8.89 <--                        11.7   9.06  )-----------( 103      ( 07 Nov 2021 22:54 )             37.8     Urine Studies:      RADIOLOGY & ADDITIONAL STUDIES:                
   [facility]    Patient is a 61y old  Male who presents with a chief complaint of   HPI:    PMH/PSH:  PAST MEDICAL & SURGICAL HISTORY:  Hypertension    Adrenal insufficiency    Anemia    Glaucoma    Coronary artery disease    HLD (hyperlipidemia)    Peripheral vascular disease    Spinal stenosis of lumbosacral region    Hyperparathyroidism    Diabetes mellitus    Diabetic neuropathy    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    Osteoarthritis    Vision loss of left eye    ESRD on hemodialysis    Cataract  both eyes - hx of sx done    BPH (benign prostatic hyperplasia)    UTI (urinary tract infection)  hx of    Bladder mass  hx of    H/O hematuria    Osteoporosis    Vision loss of right eye    Depression    Chronic GERD    Osteomyelitis of vertebra    Below knee amputation status, left  2012- pt is wearing prostesis    History of right cataract extraction    History of left cataract extraction    S/P arteriovenous (AV) fistula creation  right arm brachiocephalic arteriovenous fistula on 11/08/2018    H/O hematuria  s/p bladder bx and fulguration 2/25/2020    H/O transurethral destruction of bladder lesion  2020    History of excision of mass  back mass on 03/31/2021      FH:  FAMILY HISTORY:  Family history of cirrhosis of liver (Mother)    Family history of renal failure (Sibling)    Family history of hypertension (Sibling)    Family history of diabetes mellitus (Sibling)    History of substance abuse in sibling (Sibling)        MEDS:  MEDICATIONS  (STANDING):  insulin lispro (ADMELOG) corrective regimen sliding scale   SubCutaneous every 6 hours  pantoprazole Infusion 8 mG/Hr (10 mL/Hr) IV Continuous <Continuous>    MEDICATIONS  (PRN):  ondansetron Injectable 4 milliGRAM(s) IV Push once PRN Nausea and/or Vomiting    Allergies    fish (Rash)  liver (Anaphylaxis)  No Known Drug Allergies    Intolerances            CONSTITUTIONAL:  No weight loss, fever, chills, weakness or fatigue.  HEENT:  Eyes:  No visual loss, blurred vision, double vision or yellow sclerae. Ears, Nose, Throat:  No hearing loss, sneezing, congestion, runny nose or sore throat.  SKIN:  No rash or itching.  CARDIOVASCULAR:  No chest pain, chest pressure or chest discomfort. No palpitations or edema.  RESPIRATORY:  No shortness of breath, cough or sputum.  GASTROINTESTINAL:  SEE HPI  GENITOURINARY:  No dysuria, hematuria, urinary frequency  NEUROLOGICAL:  No headache, dizziness, syncope, paralysis, ataxia, numbness or tingling in the extremities. No change in bowel or bladder control.  MUSCULOSKELETAL:  No muscle, back pain, joint pain or stiffness.  HEMATOLOGIC:  No anemia, bleeding or bruising.  LYMPHATICS:  No enlarged nodes. No history of splenectomy.  PSYCHIATRIC:  No history of depression or anxiety.  ENDOCRINOLOGIC:  No reports of sweating, cold or heat intolerance. No polyuria or polydipsia.      ______________________________________________________________________  PHYSICAL EXAM:  T(C): 36.6 (11-08-21 @ 08:00), Max: 36.7 (11-08-21 @ 01:11)  HR: 98 (11-08-21 @ 08:00)  BP: 136/64 (11-08-21 @ 08:00)  RR: 18 (11-08-21 @ 08:00)  SpO2: 93% (11-08-21 @ 08:00)  Wt(kg): --      GEN: NAD, normocephalic  CVS: S1S2+  CHEST: clear to auscultation  ABD: soft , nontender, nondistended, bowel sounds present  EXTR: no cyanosis, no clubbing, no edema  NEURO: Awake and alert; oriented .....  SKIN:  warm;  non icteric    ______________________________________________________________________  LABS:                        11.7   9.06  )-----------( 103      ( 07 Nov 2021 22:54 )             37.8     11-07    141  |  104  |  25<H>  ----------------------------<  112<H>  4.4   |  29  |  8.89<H>    Ca    8.5      07 Nov 2021 22:54    TPro  7.2  /  Alb  3.7  /  TBili  0.8  /  DBili  x   /  AST  22  /  ALT  19  /  AlkPhos  76  11-07    LIVER FUNCTIONS - ( 07 Nov 2021 22:54 )  Alb: 3.7 g/dL / Pro: 7.2 g/dL / ALK PHOS: 76 U/L / ALT: 19 U/L DA / AST: 22 U/L / GGT: x

## 2021-11-08 NOTE — CONSULT NOTE ADULT - ASSESSMENT
# ESRD: HE IS FLUID OVERLOADED  SEC TO NON-COMPLIANCE WITH HEMODIALYSIS.  ADVISED DIALYSIS TODAY BUT REFUSED. PLAN FOR HD IN AM  # UGIB. NOTED, REFUSING PPI AND EGD  # RENAL OSTEODYSTROPHY CHECK PHOS AND PTH WITH NEXT LABS     THANKS FOR THE CONSULT

## 2021-11-08 NOTE — H&P ADULT - ASSESSMENT
61M w/ PMHx of DM, HTN, GERD, CAD, ESRD on hemodialysis, DM retinopathy from [facility],  presents to the ED with hematemesis. Patient reports he started vomiting blood 11/7 afternoon admitted for hematemesis

## 2021-11-08 NOTE — CHART NOTE - NSCHARTNOTEFT_GEN_A_CORE
EVENT: Temp 38.1    BRIEF HPI: 61M w/ PMHx of DM, HTN, GERD, CAD, ESRD on hemodialysis, DM retinopathy from [facility],  presents to the ED with hematemesis. Patient reports he started vomiting blood 11/7 afternoon admitted for hematemesis now low grade temp.    OBJECTIVE:  Vital Signs Last 24 Hrs  T(C): 38.1 (08 Nov 2021 20:55), Max: 38.1 (08 Nov 2021 20:55)  T(F): 100.6 (08 Nov 2021 20:55), Max: 100.6 (08 Nov 2021 20:55)  HR: 97 (08 Nov 2021 20:55) (95 - 108)  BP: 148/78 (08 Nov 2021 20:55) (136/64 - 166/-)  BP(mean): 99 (08 Nov 2021 18:45) (99 - 99)  RR: 18 (08 Nov 2021 20:55) (16 - 20)  SpO2: 93% (08 Nov 2021 20:55) (87% - 96%)    FOCUSED PHYSICAL EXAM:    LABS:                        11.7   9.06  )-----------( 103      ( 07 Nov 2021 22:54 )             37.8     11-07    141  |  104  |  25<H>  ----------------------------<  112<H>  4.4   |  29  |  8.89<H>    Ca    8.5      07 Nov 2021 22:54    TPro  7.2  /  Alb  3.7  /  TBili  0.8  /  DBili  x   /  AST  22  /  ALT  19  /  AlkPhos  76  11-07      EKG:   IMGAGING:    ASSESSMENT:  HPI:  61M w/ PMHx of DM, HTN, GERD, CAD, ESRD on hemodialysis, from [facility], PSH L VIRA presents to the ED with hematemesis. Patient reports he started vomiting blood 11/7 afternoon. Patient also notes intermittent cough for a while. Denies abdominal pain, diarrhea, fevers at home or any other acute complaint. Patient reports he does not urinate. Last HD was Saturday 11/6/21 per patient. Pt refused to be interviewed in the ED and stated that he felt fine and would like to go home. Pt refusing all IV medications. Pt refused EGD and all blood draws. No fever, chills. No abdominal pain, chest pain.  (08 Nov 2021 12:10)      PLAN:   MEDICATIONS  (STANDING):  ALPRAZolam 0.5 milliGRAM(s) Oral at bedtime  amLODIPine   Tablet 10 milliGRAM(s) Oral daily  aspirin enteric coated 81 milliGRAM(s) Oral daily  cholecalciferol 1000 Unit(s) Oral daily  folic acid 1 milliGRAM(s) Oral daily  hydrALAZINE 50 milliGRAM(s) Oral three times a day  insulin lispro (ADMELOG) corrective regimen sliding scale   SubCutaneous every 6 hours  labetalol 100 milliGRAM(s) Oral two times a day  metoclopramide 5 milliGRAM(s) Oral three times a day  nortriptyline 10 milliGRAM(s) Oral at bedtime  pantoprazole   Suspension 40 milliGRAM(s) Oral every 12 hours  sevelamer carbonate 2400 milliGRAM(s) Oral three times a day with meals  simvastatin 40 milliGRAM(s) Oral at bedtime  sucralfate 1 Gram(s) Oral four times a day    MEDICATIONS  (PRN):  acetaminophen     Tablet .. 650 milliGRAM(s) Oral every 6 hours PRN Temp greater or equal to 38C (100.4F)  ondansetron Injectable 4 milliGRAM(s) IV Push once PRN Nausea and/or Vomiting  oxycodone    5 mG/acetaminophen 325 mG 1 Tablet(s) Oral every 8 hours PRN Severe Pain (7 - 10)  zolpidem 5 milliGRAM(s) Oral at bedtime PRN Insomnia    FOLLOW UP / RESULT: EVENT: Temp 38.1    BRIEF HPI: 61M w/ PMHx of DM, HTN, GERD, CAD, ESRD on hemodialysis, DM retinopathy from [facility],  presents to the ED with hematemesis. Patient reports he started vomiting blood 11/7 afternoon admitted for hematemesis now low grade temp.    OBJECTIVE:  Vital Signs Last 24 Hrs  T(C): 38.1 (08 Nov 2021 20:55), Max: 38.1 (08 Nov 2021 20:55)  T(F): 100.6 (08 Nov 2021 20:55), Max: 100.6 (08 Nov 2021 20:55)  HR: 97 (08 Nov 2021 20:55) (95 - 108)  BP: 148/78 (08 Nov 2021 20:55) (136/64 - 166/-)  BP(mean): 99 (08 Nov 2021 18:45) (99 - 99)  RR: 18 (08 Nov 2021 20:55) (16 - 20)  SpO2: 93% (08 Nov 2021 20:55) (87% - 96%)    FOCUSED PHYSICAL EXAM:  NEURO: Alert, oriented X 4, disgruntled  RESP: Even, unlabored  CV: Refused    LABS:                        11.7   9.06  )-----------( 103      ( 07 Nov 2021 22:54 )             37.8     11-07    141  |  104  |  25<H>  ----------------------------<  112<H>  4.4   |  29  |  8.89<H>    Ca    8.5      07 Nov 2021 22:54    TPro  7.2  /  Alb  3.7  /  TBili  0.8  /  DBili  x   /  AST  22  /  ALT  19  /  AlkPhos  76  11-07    IMAGING:  EXAM:  XR CHEST PORTABLE IMMED 1V                        PROCEDURE DATE:  11/07/2021    INTERPRETATION:  INDICATION: cough  PRIORS: 1/28/2021  VIEWS: Portable AP radiography of the chest performed. Evaluation limited secondary to shallow inspiration and portable technique.  FINDINGS: Heart size cannot be quantitated on this portable evaluation. No hilar or superior mediastinal abnormalities are identified. Interstitial and airspace opacities are suggested bilaterally. No pleural effusion. No pneumothorax. No mediastinal shift. Stent graft material is identified overlying the right axillary region.  IMPRESSION: Interstitial and airspace opacities are suggested bilaterally. Clinical correlation and follow-up suggested.    PROBLEM: Low grade temp spike  PLAN:   1. Acetaminophen Tablet 650 al GRAM(s) Oral every 6 hours PRN Temp greater or equal to 38C (100.4F) ordered    FOLLOW UP / RESULT: Trend  temp

## 2021-11-09 LAB
ALBUMIN SERPL ELPH-MCNC: 2.7 G/DL — LOW (ref 3.5–5)
ALP SERPL-CCNC: 65 U/L — SIGNIFICANT CHANGE UP (ref 40–120)
ALT FLD-CCNC: 13 U/L DA — SIGNIFICANT CHANGE UP (ref 10–60)
ANION GAP SERPL CALC-SCNC: 12 MMOL/L — SIGNIFICANT CHANGE UP (ref 5–17)
AST SERPL-CCNC: 7 U/L — LOW (ref 10–40)
BASOPHILS # BLD AUTO: 0 K/UL — SIGNIFICANT CHANGE UP (ref 0–0.2)
BASOPHILS NFR BLD AUTO: 0 % — SIGNIFICANT CHANGE UP (ref 0–2)
BILIRUB SERPL-MCNC: 0.6 MG/DL — SIGNIFICANT CHANGE UP (ref 0.2–1.2)
BUN SERPL-MCNC: 52 MG/DL — HIGH (ref 7–18)
CALCIUM SERPL-MCNC: 7.4 MG/DL — LOW (ref 8.4–10.5)
CHLORIDE SERPL-SCNC: 98 MMOL/L — SIGNIFICANT CHANGE UP (ref 96–108)
CO2 SERPL-SCNC: 26 MMOL/L — SIGNIFICANT CHANGE UP (ref 22–31)
CREAT SERPL-MCNC: 13.8 MG/DL — HIGH (ref 0.5–1.3)
EOSINOPHIL # BLD AUTO: 0.09 K/UL — SIGNIFICANT CHANGE UP (ref 0–0.5)
EOSINOPHIL NFR BLD AUTO: 1.5 % — SIGNIFICANT CHANGE UP (ref 0–6)
GLUCOSE BLDC GLUCOMTR-MCNC: 126 MG/DL — HIGH (ref 70–99)
GLUCOSE BLDC GLUCOMTR-MCNC: 143 MG/DL — HIGH (ref 70–99)
GLUCOSE BLDC GLUCOMTR-MCNC: 179 MG/DL — HIGH (ref 70–99)
GLUCOSE BLDC GLUCOMTR-MCNC: 74 MG/DL — SIGNIFICANT CHANGE UP (ref 70–99)
GLUCOSE BLDC GLUCOMTR-MCNC: 82 MG/DL — SIGNIFICANT CHANGE UP (ref 70–99)
GLUCOSE SERPL-MCNC: 178 MG/DL — HIGH (ref 70–99)
HCT VFR BLD CALC: 30.2 % — LOW (ref 39–50)
HGB BLD-MCNC: 9.5 G/DL — LOW (ref 13–17)
IMM GRANULOCYTES NFR BLD AUTO: 0.3 % — SIGNIFICANT CHANGE UP (ref 0–1.5)
LYMPHOCYTES # BLD AUTO: 0.39 K/UL — LOW (ref 1–3.3)
LYMPHOCYTES # BLD AUTO: 6.6 % — LOW (ref 13–44)
MCHC RBC-ENTMCNC: 27.6 PG — SIGNIFICANT CHANGE UP (ref 27–34)
MCHC RBC-ENTMCNC: 31.5 GM/DL — LOW (ref 32–36)
MCV RBC AUTO: 87.8 FL — SIGNIFICANT CHANGE UP (ref 80–100)
MONOCYTES # BLD AUTO: 0.22 K/UL — SIGNIFICANT CHANGE UP (ref 0–0.9)
MONOCYTES NFR BLD AUTO: 3.7 % — SIGNIFICANT CHANGE UP (ref 2–14)
NEUTROPHILS # BLD AUTO: 5.23 K/UL — SIGNIFICANT CHANGE UP (ref 1.8–7.4)
NEUTROPHILS NFR BLD AUTO: 87.9 % — HIGH (ref 43–77)
NRBC # BLD: 0 /100 WBCS — SIGNIFICANT CHANGE UP (ref 0–0)
PLATELET # BLD AUTO: 92 K/UL — LOW (ref 150–400)
POTASSIUM SERPL-MCNC: 4.4 MMOL/L — SIGNIFICANT CHANGE UP (ref 3.5–5.3)
POTASSIUM SERPL-SCNC: 4.4 MMOL/L — SIGNIFICANT CHANGE UP (ref 3.5–5.3)
PROT SERPL-MCNC: 6.2 G/DL — SIGNIFICANT CHANGE UP (ref 6–8.3)
RBC # BLD: 3.44 M/UL — LOW (ref 4.2–5.8)
RBC # FLD: 19.7 % — HIGH (ref 10.3–14.5)
SODIUM SERPL-SCNC: 136 MMOL/L — SIGNIFICANT CHANGE UP (ref 135–145)
WBC # BLD: 5.95 K/UL — SIGNIFICANT CHANGE UP (ref 3.8–10.5)
WBC # FLD AUTO: 5.95 K/UL — SIGNIFICANT CHANGE UP (ref 3.8–10.5)

## 2021-11-09 PROCEDURE — 71250 CT THORAX DX C-: CPT | Mod: 26

## 2021-11-09 PROCEDURE — 74176 CT ABD & PELVIS W/O CONTRAST: CPT | Mod: 26

## 2021-11-09 RX ORDER — PANTOPRAZOLE SODIUM 20 MG/1
40 TABLET, DELAYED RELEASE ORAL EVERY 12 HOURS
Refills: 0 | Status: DISCONTINUED | OUTPATIENT
Start: 2021-11-09 | End: 2021-11-10

## 2021-11-09 RX ADMIN — SIMVASTATIN 40 MILLIGRAM(S): 20 TABLET, FILM COATED ORAL at 23:17

## 2021-11-09 RX ADMIN — Medication 50 MILLIGRAM(S): at 23:16

## 2021-11-09 RX ADMIN — Medication 1 MILLIGRAM(S): at 11:39

## 2021-11-09 RX ADMIN — NORTRIPTYLINE HYDROCHLORIDE 10 MILLIGRAM(S): 10 CAPSULE ORAL at 23:17

## 2021-11-09 RX ADMIN — SEVELAMER CARBONATE 2400 MILLIGRAM(S): 2400 POWDER, FOR SUSPENSION ORAL at 08:26

## 2021-11-09 RX ADMIN — Medication 5 MILLIGRAM(S): at 23:16

## 2021-11-09 RX ADMIN — Medication 1 GRAM(S): at 23:17

## 2021-11-09 RX ADMIN — Medication 50 MILLIGRAM(S): at 06:18

## 2021-11-09 RX ADMIN — Medication 1 GRAM(S): at 06:18

## 2021-11-09 RX ADMIN — Medication 5 MILLIGRAM(S): at 06:18

## 2021-11-09 RX ADMIN — Medication 1000 UNIT(S): at 11:39

## 2021-11-09 RX ADMIN — Medication 81 MILLIGRAM(S): at 13:13

## 2021-11-09 RX ADMIN — PANTOPRAZOLE SODIUM 40 MILLIGRAM(S): 20 TABLET, DELAYED RELEASE ORAL at 06:26

## 2021-11-09 RX ADMIN — Medication 100 MILLIGRAM(S): at 06:18

## 2021-11-09 RX ADMIN — Medication 1 GRAM(S): at 11:39

## 2021-11-09 RX ADMIN — Medication 0.5 MILLIGRAM(S): at 22:18

## 2021-11-09 RX ADMIN — Medication 5 MILLIGRAM(S): at 13:13

## 2021-11-09 RX ADMIN — Medication 50 MILLIGRAM(S): at 13:13

## 2021-11-09 RX ADMIN — SEVELAMER CARBONATE 2400 MILLIGRAM(S): 2400 POWDER, FOR SUSPENSION ORAL at 11:39

## 2021-11-09 RX ADMIN — AMLODIPINE BESYLATE 10 MILLIGRAM(S): 2.5 TABLET ORAL at 06:18

## 2021-11-09 NOTE — PROGRESS NOTE ADULT - SUBJECTIVE AND OBJECTIVE BOX
[facility] : Progress Note :: [phone], (office [phone]),   Dr Mosher / Dr Washington / Dr Young / Dr Doll / Dr Varsha TURCIOS / Dr Tello / Dr Garcia / Dr Larry qiu  _____________________________________________________________________________________________    seen earlier  agreed to HD     fish (Rash)  liver (Anaphylaxis)  No Known Drug Allergies    Hospital Medications:   MEDICATIONS  (STANDING):  ALPRAZolam 0.5 milliGRAM(s) Oral at bedtime  amLODIPine   Tablet 10 milliGRAM(s) Oral daily  aspirin enteric coated 81 milliGRAM(s) Oral daily  cholecalciferol 1000 Unit(s) Oral daily  folic acid 1 milliGRAM(s) Oral daily  hydrALAZINE 50 milliGRAM(s) Oral three times a day  insulin lispro (ADMELOG) corrective regimen sliding scale   SubCutaneous every 6 hours  labetalol 100 milliGRAM(s) Oral two times a day  metoclopramide 5 milliGRAM(s) Oral three times a day  nortriptyline 10 milliGRAM(s) Oral at bedtime  pantoprazole  Injectable 40 milliGRAM(s) IV Push every 12 hours  sevelamer carbonate 2400 milliGRAM(s) Oral three times a day with meals  simvastatin 40 milliGRAM(s) Oral at bedtime  sucralfate 1 Gram(s) Oral four times a day      VITALS:  T(F): 99.4 (11-09-21 @ 13:55), Max: 100.6 (11-08-21 @ 20:55)  HR: 61 (11-09-21 @ 13:55)  BP: 145/69 (11-09-21 @ 13:55)  RR: 17 (11-09-21 @ 13:55)  SpO2: 96% (11-09-21 @ 13:55)  Wt(kg): --      PHYSICAL EXAM:  Constitutional: NAD  HEENT: anicteric sclera, oropharynx clear.  Neck: No JVD  Respiratory: CTAB, no wheezes, rales or rhonchi  Cardiovascular: S1, S2, RRR  Gastrointestinal: BS+, soft, NT/ND  Extremities:  No peripheral edema  Neurological: A/O x 3, no focal deficits  Psychiatric: Normal mood, normal affect  : No CVA tenderness. No [place].   Skin: No rashes  Vascular Access: AVF with htrill and bruit    LABS:  11-09    136  |  98  |  52<H>  ----------------------------<  178<H>  4.4   |  26  |  13.80<H>    Ca    7.4<L>      09 Nov 2021 17:54    TPro  6.2  /  Alb  2.7<L>  /  TBili  0.6  /  DBili      /  AST  7<L>  /  ALT  13  /  AlkPhos  65  11-09    Creatinine Trend: 13.80 <--, 8.89 <--                        9.5    5.95  )-----------( x        ( 09 Nov 2021 17:54 )             30.2     Urine Studies:      RADIOLOGY & ADDITIONAL STUDIES:

## 2021-11-09 NOTE — PROGRESS NOTE ADULT - ASSESSMENT
# ESRD: HE IS FLUID OVERLOADED  SEC TO NON-COMPLIANCE WITH HEMODIALYSIS.  CONT HD TIW WITH UF AS TOLERATED BY BP  DIETARY SALT AND FLUID RESTRICTION  # UGIB. NOTED, REFUSING EGD. ON PPI

## 2021-11-09 NOTE — PROGRESS NOTE ADULT - PROBLEM SELECTOR PLAN 2
Pt commonly misses dialysis  Last hd reportedly was 11/6  Agreeable to HD today   c/w sevelamer  Nephro Dr Njeru

## 2021-11-09 NOTE — PROGRESS NOTE ADULT - PROBLEM SELECTOR PLAN 1
p/w hematemesis w/ history of esophagitis  refusing protonix gtt  Switched from Po protonix to IV   GI, Dr Molina following   Pt refusing EGD  monitor cbc when possible; agreeable to cbc check today; 9.5/30.2  hemodynamically stable currently  Psych consulted today; pending. Follow up with brother after psych eval  Georgi Fernando [phone]  Agreeable to CT C/A/P follow up result

## 2021-11-09 NOTE — PROGRESS NOTE ADULT - SUBJECTIVE AND OBJECTIVE BOX
Patient is a 61y old  Male who presents with a chief complaint of vomitting (08 Nov 2021 13:03)      INTERVAL HPI/OVERNIGHT EVENTS: no new complaints    MEDICATIONS  (STANDING):  ALPRAZolam 0.5 milliGRAM(s) Oral at bedtime  amLODIPine   Tablet 10 milliGRAM(s) Oral daily  aspirin enteric coated 81 milliGRAM(s) Oral daily  cholecalciferol 1000 Unit(s) Oral daily  folic acid 1 milliGRAM(s) Oral daily  hydrALAZINE 50 milliGRAM(s) Oral three times a day  insulin lispro (ADMELOG) corrective regimen sliding scale   SubCutaneous every 6 hours  labetalol 100 milliGRAM(s) Oral two times a day  metoclopramide 5 milliGRAM(s) Oral three times a day  nortriptyline 10 milliGRAM(s) Oral at bedtime  pantoprazole   Suspension 40 milliGRAM(s) Oral every 12 hours  sevelamer carbonate 2400 milliGRAM(s) Oral three times a day with meals  simvastatin 40 milliGRAM(s) Oral at bedtime  sucralfate 1 Gram(s) Oral four times a day    MEDICATIONS  (PRN):  acetaminophen     Tablet .. 650 milliGRAM(s) Oral every 6 hours PRN Temp greater or equal to 38C (100.4F)  ondansetron Injectable 4 milliGRAM(s) IV Push once PRN Nausea and/or Vomiting  oxycodone    5 mG/acetaminophen 325 mG 1 Tablet(s) Oral every 8 hours PRN Severe Pain (7 - 10)  zolpidem 5 milliGRAM(s) Oral at bedtime PRN Insomnia      __________________________________________________  REVIEW OF SYSTEMS:    CONSTITUTIONAL: No fever,   RESPIRATORY: No cough; No shortness of breath  CARDIOVASCULAR: No chest pain, no palpitations  GASTROINTESTINAL: No pain. No nausea or vomiting; No diarrhea   NEUROLOGICAL: No headache or numbness, no tremors  MUSCULOSKELETAL: No joint pain, no muscle pain  GENITOURINARY: esrd; confirms HD today  PSYCHIATRY: no depression , no anxiety  ALL OTHER  ROS negative        Vital Signs Last 24 Hrs  T(C): 36.8 (09 Nov 2021 06:12), Max: 38.1 (08 Nov 2021 20:55)  T(F): 98.2 (09 Nov 2021 06:12), Max: 100.6 (08 Nov 2021 20:55)  HR: 96 (09 Nov 2021 06:12) (95 - 100)  BP: 138/80 (09 Nov 2021 06:12) (138/80 - 152/74)  BP(mean): 99 (08 Nov 2021 18:45) (99 - 99)  RR: 19 (09 Nov 2021 06:12) (16 - 19)  SpO2: 96% (09 Nov 2021 06:12) (93% - 96%)    ________________________________________________  PHYSICAL EXAM:  GENERAL: NAD  HEENT: Normocephalic; atraumatic   CHEST/LUNG: Clear to auscultation bilaterally with good air entry   HEART: +S1 +S2  regular  ABDOMEN: Soft, Nontender, Nondistended; Bowel sounds present  EXTREMITIES: no cyanosis; no edema; Left BKA, prosthesis   SKIN: warm and dry; no rash  NERVOUS SYSTEM:  Awake and alert; Oriented  to place, person and time ; no new deficits    _________________________________________________  LABS:                        11.7   9.06  )-----------( 103      ( 07 Nov 2021 22:54 )             37.8     11-07    141  |  104  |  25<H>  ----------------------------<  112<H>  4.4   |  29  |  8.89<H>    Ca    8.5      07 Nov 2021 22:54    TPro  7.2  /  Alb  3.7  /  TBili  0.8  /  DBili  x   /  AST  22  /  ALT  19  /  AlkPhos  76  11-07        CAPILLARY BLOOD GLUCOSE      POCT Blood Glucose.: 74 mg/dL (09 Nov 2021 06:07)  POCT Blood Glucose.: 82 mg/dL (09 Nov 2021 00:41)  POCT Blood Glucose.: 110 mg/dL (08 Nov 2021 19:35)  POCT Blood Glucose.: 67 mg/dL (08 Nov 2021 18:41)        RADIOLOGY & ADDITIONAL TESTS:    Imaging Personally Reviewed:  YES/NO    Consultant(s) Notes Reviewed:   YES/ No    Care Discussed with Consultants :     Plan of care was discussed with patient and /or primary care giver; all questions and concerns were addressed and care was aligned with patient's wishes.     Patient is a 61y old  Male who presents with a chief complaint of vomitting (08 Nov 2021 13:03)      INTERVAL HPI/OVERNIGHT EVENTS: no new complaints    MEDICATIONS  (STANDING):  ALPRAZolam 0.5 milliGRAM(s) Oral at bedtime  amLODIPine   Tablet 10 milliGRAM(s) Oral daily  aspirin enteric coated 81 milliGRAM(s) Oral daily  cholecalciferol 1000 Unit(s) Oral daily  folic acid 1 milliGRAM(s) Oral daily  hydrALAZINE 50 milliGRAM(s) Oral three times a day  insulin lispro (ADMELOG) corrective regimen sliding scale   SubCutaneous every 6 hours  labetalol 100 milliGRAM(s) Oral two times a day  metoclopramide 5 milliGRAM(s) Oral three times a day  nortriptyline 10 milliGRAM(s) Oral at bedtime  pantoprazole   Suspension 40 milliGRAM(s) Oral every 12 hours  sevelamer carbonate 2400 milliGRAM(s) Oral three times a day with meals  simvastatin 40 milliGRAM(s) Oral at bedtime  sucralfate 1 Gram(s) Oral four times a day    MEDICATIONS  (PRN):  acetaminophen     Tablet .. 650 milliGRAM(s) Oral every 6 hours PRN Temp greater or equal to 38C (100.4F)  ondansetron Injectable 4 milliGRAM(s) IV Push once PRN Nausea and/or Vomiting  oxycodone    5 mG/acetaminophen 325 mG 1 Tablet(s) Oral every 8 hours PRN Severe Pain (7 - 10)  zolpidem 5 milliGRAM(s) Oral at bedtime PRN Insomnia      __________________________________________________  REVIEW OF SYSTEMS:    CONSTITUTIONAL: No fever,   RESPIRATORY: No cough; No shortness of breath  CARDIOVASCULAR: No chest pain, no palpitations  GASTROINTESTINAL: No pain. No nausea or vomiting; No diarrhea   NEUROLOGICAL: No headache or numbness, no tremors  MUSCULOSKELETAL: No joint pain, no muscle pain  GENITOURINARY: esrd; confirms HD today  PSYCHIATRY: no depression , no anxiety  ALL OTHER  ROS negative        Vital Signs Last 24 Hrs  T(C): 36.8 (09 Nov 2021 06:12), Max: 38.1 (08 Nov 2021 20:55)  T(F): 98.2 (09 Nov 2021 06:12), Max: 100.6 (08 Nov 2021 20:55)  HR: 96 (09 Nov 2021 06:12) (95 - 100)  BP: 138/80 (09 Nov 2021 06:12) (138/80 - 152/74)  BP(mean): 99 (08 Nov 2021 18:45) (99 - 99)  RR: 19 (09 Nov 2021 06:12) (16 - 19)  SpO2: 96% (09 Nov 2021 06:12) (93% - 96%)    ________________________________________________  PHYSICAL EXAM:  GENERAL: NAD  HEENT: Normocephalic; atraumatic   CHEST/LUNG: Clear to auscultation bilaterally with good air entry   HEART: +S1 +S2  regular  ABDOMEN: Soft, Nontender, Nondistended; Bowel sounds present  EXTREMITIES: no cyanosis; no edema; Left BKA, prosthesis RUE HD access + bruit, + thrill   SKIN: warm and dry; no rash  NERVOUS SYSTEM:  Awake and alert; Oriented  to place, person and time ; no new deficits    _________________________________________________  LABS:                        11.7   9.06  )-----------( 103      ( 07 Nov 2021 22:54 )             37.8     11-07    141  |  104  |  25<H>  ----------------------------<  112<H>  4.4   |  29  |  8.89<H>    Ca    8.5      07 Nov 2021 22:54    TPro  7.2  /  Alb  3.7  /  TBili  0.8  /  DBili  x   /  AST  22  /  ALT  19  /  AlkPhos  76  11-07        CAPILLARY BLOOD GLUCOSE      POCT Blood Glucose.: 74 mg/dL (09 Nov 2021 06:07)  POCT Blood Glucose.: 82 mg/dL (09 Nov 2021 00:41)  POCT Blood Glucose.: 110 mg/dL (08 Nov 2021 19:35)  POCT Blood Glucose.: 67 mg/dL (08 Nov 2021 18:41)        RADIOLOGY & ADDITIONAL TESTS:    .

## 2021-11-09 NOTE — PROGRESS NOTE ADULT - SUBJECTIVE AND OBJECTIVE BOX
Patient is a 61y old  Male who presents with a chief complaint of Hematemesis (09 Nov 2021 09:20)    PATIENT IS SEEN AND EXAMINED IN MEDICAL FLOOR.  NGT [    ]    JEREMY [   ]      GT [   ]    ALLERGIES:  fish (Rash)  liver (Anaphylaxis)  No Known Drug Allergies      Daily     Daily     VITALS:    Vital Signs Last 24 Hrs  T(C): 37.4 (09 Nov 2021 13:55), Max: 38.1 (08 Nov 2021 20:55)  T(F): 99.4 (09 Nov 2021 13:55), Max: 100.6 (08 Nov 2021 20:55)  HR: 61 (09 Nov 2021 13:55) (61 - 100)  BP: 145/69 (09 Nov 2021 13:55) (138/80 - 152/73)  BP(mean): 99 (08 Nov 2021 18:45) (99 - 99)  RR: 17 (09 Nov 2021 13:55) (16 - 19)  SpO2: 96% (09 Nov 2021 13:55) (93% - 96%)    LABS:    CBC Full  -  ( 07 Nov 2021 22:54 )  WBC Count : 9.06 K/uL  RBC Count : 4.26 M/uL  Hemoglobin : 11.7 g/dL  Hematocrit : 37.8 %  Platelet Count - Automated : 103 K/uL  Mean Cell Volume : 88.7 fl  Mean Cell Hemoglobin : 27.5 pg  Mean Cell Hemoglobin Concentration : 31.0 gm/dL  Auto Neutrophil # : 8.39 K/uL  Auto Lymphocyte # : 0.20 K/uL  Auto Monocyte # : 0.37 K/uL  Auto Eosinophil # : 0.05 K/uL  Auto Basophil # : 0.02 K/uL  Auto Neutrophil % : 92.6 %  Auto Lymphocyte % : 2.2 %  Auto Monocyte % : 4.1 %  Auto Eosinophil % : 0.6 %  Auto Basophil % : 0.2 %      11-07    141  |  104  |  25<H>  ----------------------------<  112<H>  4.4   |  29  |  8.89<H>    Ca    8.5      07 Nov 2021 22:54    TPro  7.2  /  Alb  3.7  /  TBili  0.8  /  DBili  x   /  AST  22  /  ALT  19  /  AlkPhos  76  11-07    CAPILLARY BLOOD GLUCOSE      POCT Blood Glucose.: 179 mg/dL (09 Nov 2021 17:25)  POCT Blood Glucose.: 126 mg/dL (09 Nov 2021 11:22)  POCT Blood Glucose.: 74 mg/dL (09 Nov 2021 06:07)  POCT Blood Glucose.: 82 mg/dL (09 Nov 2021 00:41)  POCT Blood Glucose.: 110 mg/dL (08 Nov 2021 19:35)  POCT Blood Glucose.: 67 mg/dL (08 Nov 2021 18:41)        LIVER FUNCTIONS - ( 07 Nov 2021 22:54 )  Alb: 3.7 g/dL / Pro: 7.2 g/dL / ALK PHOS: 76 U/L / ALT: 19 U/L DA / AST: 22 U/L / GGT: x           Creatinine Trend: 8.89<--  I&O's Summary              MEDICATIONS:    MEDICATIONS  (STANDING):  ALPRAZolam 0.5 milliGRAM(s) Oral at bedtime  amLODIPine   Tablet 10 milliGRAM(s) Oral daily  aspirin enteric coated 81 milliGRAM(s) Oral daily  cholecalciferol 1000 Unit(s) Oral daily  folic acid 1 milliGRAM(s) Oral daily  hydrALAZINE 50 milliGRAM(s) Oral three times a day  insulin lispro (ADMELOG) corrective regimen sliding scale   SubCutaneous every 6 hours  labetalol 100 milliGRAM(s) Oral two times a day  metoclopramide 5 milliGRAM(s) Oral three times a day  nortriptyline 10 milliGRAM(s) Oral at bedtime  pantoprazole  Injectable 40 milliGRAM(s) IV Push every 12 hours  sevelamer carbonate 2400 milliGRAM(s) Oral three times a day with meals  simvastatin 40 milliGRAM(s) Oral at bedtime  sucralfate 1 Gram(s) Oral four times a day      MEDICATIONS  (PRN):  acetaminophen     Tablet .. 650 milliGRAM(s) Oral every 6 hours PRN Temp greater or equal to 38C (100.4F)  ondansetron Injectable 4 milliGRAM(s) IV Push once PRN Nausea and/or Vomiting  oxycodone    5 mG/acetaminophen 325 mG 1 Tablet(s) Oral every 8 hours PRN Severe Pain (7 - 10)  zolpidem 5 milliGRAM(s) Oral at bedtime PRN Insomnia      REVIEW OF SYSTEMS:                           ALL ROS DONE [ X   ]    CONSTITUTIONAL:  LETHARGIC [   ], FEVER [   ], UNRESPONSIVE [   ]  CVS:  CP  [   ], SOB, [   ], PALPITATIONS [   ], DIZZYNESS [   ]  RS: COUGH [   ], SPUTUM [   ]  GI: ABDOMINAL PAIN [   ], NAUSEA [   ], VOMITINGS [   ], DIARRHEA [   ], CONSTIPATION [   ]  :  DYSURIA [   ], NOCTURIA [   ], INCREASED FREQUENCY [   ], DRIBLING [   ],  SKELETAL: PAINFUL JOINTS [   ], SWOLLEN JOINTS [   ], NECK ACHE [   ], LOW BACK ACHE [   ],  SKIN : ULCERS [   ], RASH [   ], ITCHING [   ]  CNS: HEAD ACHE [   ], DOUBLE VISION [   ], BLURRED VISION [   ], AMS / CONFUSION [   ], SEIZURES [   ], WEAKNESS [   ],TINGLING / NUMBNESS [   ]    PHYSICAL EXAMINATION:  GENERAL APPEARANCE: NO DISTRESS  HEENT:  NO PALLOR, NO  JVD,  NO   NODES, NECK SUPPLE  CVS: S1 +, S2 +,   RS: AEEB,  OCCASIONAL  RALES +,   NO RONCHI  ABD: SOFT, NT, NO, BS +  EXT: NO PE   , LEFT BKA +  SKIN: WARM,    RIGHT UPPER EXTREMITY U/S  SKELETAL:  ROM ACCEPTABLE  CNS:  AAO X 3, NO DEFICITS  ,  VISUALLY IMPAIRED +     RADIOLOGY :    EXAM:  CT ABDOMEN AND PELVIS                            PROCEDURE DATE:  11/09/2021          INTERPRETATION:  CLINICAL INFORMATION: Hematemesis.    COMPARISON: Noncontrast CT of the abdomen and pelvis dated January 17, 2021, noncontrast CT of the thorax dated November 27, 2020.    CONTRAST/COMPLICATIONS:  IV Contrast: NONE  Oral Contrast: NONE  Complications: None reported at time of study completion    PROCEDURE:  CT of the Chest, Abdomen and Pelvis was performed.  Sagittal and coronal reformats were performed.    FINDINGS:  CHEST:  LUNGS AND LARGE AIRWAYS: Patent central airways. Mild bilateral lower lobe dependent atelectasis. No discrete pulmonary nodule or confluent airspace opacity is identified in the aerated lungs.  PLEURA: Trace layering bilateral pleural fluid. No pneumothorax or pneumomediastinum.  VESSELS: Within normal limits. There is a stent in the right axillary vein.  HEART: Heart size is normal. Trace pericardial effusion.  MEDIASTINUM AND PADMINI: No lymphadenopathy. There is moderate concentric wall thickening of the mildly patulous esophagus. No intramural gas is identified.  CHEST WALL AND LOWER NECK: Within normal limits.    ABDOMEN AND PELVIS:  LIVER: Within normal limits.  BILE DUCTS: Normal caliber.  GALLBLADDER: Within normal limits.  SPLEEN: Within normal limits.  PANCREAS: Within normal limits.  ADRENALS: Low-attenuation thickening of the bilateral adrenal glands, compatible with adenomatous hyperplasia.  KIDNEYS/URETERS: The kidneys are atrophic. Thereare small bilateral renal cysts.    BLADDER: Concentric wall thickening of the minimally distended bladder..  REPRODUCTIVE ORGANS: The prostate is prominent.    BOWEL: Colonic diverticulosis. No bowel obstruction. Appendix is normal.  PERITONEUM: No ascites.  VESSELS: Within normal limits.  RETROPERITONEUM/LYMPH NODES: No lymphadenopathy.  ABDOMINAL WALL: Within normal limits.  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    IMPRESSION: No evidence of acute inflammatory or obstructive process in the abdomen and pelvis. Diffusely thickened and mildly patulous esophagus, similar to November 2020. Bilateral lower lobe dependent atelectasis.      ASSESSMENT :     Hematemesis    No pertinent past medical history    Hypertension    Adrenal insufficiency    CKD (chronic kidney disease)    Anemia    Glaucoma    Coronary artery disease    HLD (hyperlipidemia)    Peripheral vascular disease    Spinal stenosis of lumbosacral region    Hyperparathyroidism    Diabetes mellitus    Diabetic neuropathy    Contracture of hand    Osteoarthritis    Osteoporosis    Vision loss of left eye    ESRD on hemodialysis    Cataract    BPH (benign prostatic hyperplasia)    UTI (urinary tract infection)    Bladder mass    H/O hematuria    Osteoporosis    Vision loss of right eye    Depression    Chronic GERD    Osteomyelitis of vertebra    No significant past surgical history    Below knee amputation status, left    History of right cataract extraction    History of left cataract extraction    S/P arteriovenous (AV) fistula creation    H/O hematuria    H/O transurethral destruction of bladder lesion    History of excision of mass        PLAN:  HPI:  61M w/ PMHx of DM, HTN, GERD, CAD, ESRD on hemodialysis, from [facility], [facility] presents to the ED with hematemesis. Patient reports he started vomiting blood 11/7 afternoon. Patient also notes intermittent cough for a while. Denies abdominal pain, diarrhea, fevers at home or any other acute complaint. Patient reports he does not urinate. Last HD was Saturday 11/6/21 per patient. Pt refused to be interviewed in the ED and stated that he felt fine and would like to go home. Pt refusing all IV medications. Pt refused EGD and all blood draws. No fever, chills. No abdominal pain, chest pain.  (08 Nov 2021 12:10)    # COFFEE GROUND EMESIS VS. HEMATEMESIS W/ DIAGNOSIS OF ESOPHAGITIS AND DUODENITIS [1/2021], HX OF GASTROPARESIS  - MONITORING HGB, PLACED ON PPI BID, CARAFATE, PRN ANTIEMETICS, GASTROENTEROLOGY CONSULT  - RESUMED DIET AS NO EMESIS > 36 HOURS  - PATIENT IS REFUSING EGD - COUNSELLED REGARDING MORTALITY RISK OF NOT PURSUING ENDOSCOPING EVALUATION. AGREEABLE FOR CT CHEST/ABD/PELV    # SEVERE PROTEIN CALORIE MALNUTRITION, FAILURE TO THRIVE - NUTRITIONAL SUPPLEMENT  # ANEMIA OF CKD  # HLD  # ESRD ON HD TTS - W/ HX OF NONCOMPLIANCE - NEPHROLOGY CONSULT IN PROGRESS  # HTN  # DM   # PARTIALLY BLIND  # S/P LEFT BKA  # HX OF PVD  # LS SPINAL STENOSIS  # GI AND DVT PPX    SHAKIR MAC MD COVERING KUSH MAC MD

## 2021-11-09 NOTE — PROGRESS NOTE ADULT - PROBLEM SELECTOR PLAN 4
f/u A1c  On insulin regular sliding scale at home  sliding scale  Adjust insulin as indicated  Continue with blood glucose monitoring QAC and QHS

## 2021-11-09 NOTE — PROGRESS NOTE ADULT - ASSESSMENT
61M w/ PMHx of DM, HTN, GERD, CAD, ESRD on hemodialysis, DM retinopathy from [facility] [address] assisted living,  presents to the ED with hematemesis. Patient reported that he started vomiting blood 11/7 afternoon admitted for hematemesis; seen by GI however patient declining EGD/ w/u. Today, he agreed to HD w/ labs at HD. Attending in to discuss with patient and he is now agreeing to CT C/A/P. Continued on Protonix ( IV) and Carafate. Diet advanced to soft as no further hematemesis inpatient

## 2021-11-10 ENCOUNTER — TRANSCRIPTION ENCOUNTER (OUTPATIENT)
Age: 61
End: 2021-11-10

## 2021-11-10 DIAGNOSIS — Z02.9 ENCOUNTER FOR ADMINISTRATIVE EXAMINATIONS, UNSPECIFIED: ICD-10-CM

## 2021-11-10 LAB
CALCIUM SERPL-MCNC: 7.3 MG/DL — LOW (ref 8.4–10.5)
COVID-19 NUCLEOCAPSID GAM AB INTERP: NEGATIVE — SIGNIFICANT CHANGE UP
COVID-19 NUCLEOCAPSID TOTAL GAM ANTIBODY RESULT: 0.14 INDEX — SIGNIFICANT CHANGE UP
COVID-19 SPIKE DOMAIN AB INTERP: NEGATIVE — SIGNIFICANT CHANGE UP
COVID-19 SPIKE DOMAIN ANTIBODY RESULT: 0.4 U/ML — SIGNIFICANT CHANGE UP
GLUCOSE BLDC GLUCOMTR-MCNC: 128 MG/DL — HIGH (ref 70–99)
GLUCOSE BLDC GLUCOMTR-MCNC: 135 MG/DL — HIGH (ref 70–99)
GLUCOSE BLDC GLUCOMTR-MCNC: 151 MG/DL — HIGH (ref 70–99)
GLUCOSE BLDC GLUCOMTR-MCNC: 185 MG/DL — HIGH (ref 70–99)
GLUCOSE BLDC GLUCOMTR-MCNC: 187 MG/DL — HIGH (ref 70–99)
PTH-INTACT FLD-MCNC: 246 PG/ML — HIGH (ref 15–65)
SARS-COV-2 IGG+IGM SERPL QL IA: 0.14 INDEX — SIGNIFICANT CHANGE UP
SARS-COV-2 IGG+IGM SERPL QL IA: 0.4 U/ML — SIGNIFICANT CHANGE UP
SARS-COV-2 IGG+IGM SERPL QL IA: NEGATIVE — SIGNIFICANT CHANGE UP
SARS-COV-2 IGG+IGM SERPL QL IA: NEGATIVE — SIGNIFICANT CHANGE UP

## 2021-11-10 RX ORDER — ERYTHROPOIETIN 10000 [IU]/ML
4000 INJECTION, SOLUTION INTRAVENOUS; SUBCUTANEOUS
Refills: 0 | Status: DISCONTINUED | OUTPATIENT
Start: 2021-11-10 | End: 2021-11-11

## 2021-11-10 RX ORDER — CHLORHEXIDINE GLUCONATE 213 G/1000ML
1 SOLUTION TOPICAL
Refills: 0 | Status: DISCONTINUED | OUTPATIENT
Start: 2021-11-10 | End: 2021-11-11

## 2021-11-10 RX ORDER — INSULIN LISPRO 100/ML
VIAL (ML) SUBCUTANEOUS
Refills: 0 | Status: DISCONTINUED | OUTPATIENT
Start: 2021-11-10 | End: 2021-11-11

## 2021-11-10 RX ORDER — SUCRALFATE 1 G
1 TABLET ORAL
Refills: 0 | Status: DISCONTINUED | OUTPATIENT
Start: 2021-11-10 | End: 2021-11-11

## 2021-11-10 RX ORDER — PANTOPRAZOLE SODIUM 20 MG/1
40 TABLET, DELAYED RELEASE ORAL EVERY 12 HOURS
Refills: 0 | Status: DISCONTINUED | OUTPATIENT
Start: 2021-11-10 | End: 2021-11-11

## 2021-11-10 RX ORDER — INSULIN LISPRO 100/ML
VIAL (ML) SUBCUTANEOUS AT BEDTIME
Refills: 0 | Status: DISCONTINUED | OUTPATIENT
Start: 2021-11-10 | End: 2021-11-11

## 2021-11-10 RX ADMIN — Medication 100 MILLIGRAM(S): at 18:05

## 2021-11-10 RX ADMIN — Medication 5 MILLIGRAM(S): at 06:45

## 2021-11-10 RX ADMIN — Medication 1 GRAM(S): at 23:44

## 2021-11-10 RX ADMIN — OXYCODONE AND ACETAMINOPHEN 1 TABLET(S): 5; 325 TABLET ORAL at 22:10

## 2021-11-10 RX ADMIN — PANTOPRAZOLE SODIUM 40 MILLIGRAM(S): 20 TABLET, DELAYED RELEASE ORAL at 06:46

## 2021-11-10 RX ADMIN — NORTRIPTYLINE HYDROCHLORIDE 10 MILLIGRAM(S): 10 CAPSULE ORAL at 22:05

## 2021-11-10 RX ADMIN — Medication 1 GRAM(S): at 06:46

## 2021-11-10 RX ADMIN — SEVELAMER CARBONATE 2400 MILLIGRAM(S): 2400 POWDER, FOR SUSPENSION ORAL at 08:49

## 2021-11-10 RX ADMIN — SIMVASTATIN 40 MILLIGRAM(S): 20 TABLET, FILM COATED ORAL at 22:05

## 2021-11-10 RX ADMIN — AMLODIPINE BESYLATE 10 MILLIGRAM(S): 2.5 TABLET ORAL at 06:45

## 2021-11-10 RX ADMIN — CHLORHEXIDINE GLUCONATE 1 APPLICATION(S): 213 SOLUTION TOPICAL at 22:01

## 2021-11-10 RX ADMIN — Medication 1 GRAM(S): at 18:05

## 2021-11-10 RX ADMIN — Medication 1: at 06:47

## 2021-11-10 RX ADMIN — OXYCODONE AND ACETAMINOPHEN 1 TABLET(S): 5; 325 TABLET ORAL at 23:42

## 2021-11-10 RX ADMIN — Medication 100 MILLIGRAM(S): at 06:45

## 2021-11-10 RX ADMIN — Medication 50 MILLIGRAM(S): at 14:10

## 2021-11-10 RX ADMIN — Medication 50 MILLIGRAM(S): at 22:02

## 2021-11-10 RX ADMIN — Medication 50 MILLIGRAM(S): at 06:45

## 2021-11-10 RX ADMIN — Medication 0.5 MILLIGRAM(S): at 22:07

## 2021-11-10 NOTE — PROGRESS NOTE ADULT - PROBLEM SELECTOR PLAN 4
f/u A1c  On insulin regular sliding scale at home  sliding scale  Adjust insulin as indicated  Continue with blood glucose monitoring QAC and QHS f/u A1c - refused blood work  latest HgA1C in [facility] was 5.9% in 1/2020   On insulin regular sliding scale at home  sliding scale  Adjust insulin as indicated  Continue with blood glucose monitoring QAC and QHS

## 2021-11-10 NOTE — PROGRESS NOTE ADULT - SUBJECTIVE AND OBJECTIVE BOX
NP Note discussed with  Primary Attending    Patient is a 61y old  Male who presents with a chief complaint of Hematemesis (09 Nov 2021 18:40)      INTERVAL HPI/OVERNIGHT EVENTS: no new complaints    MEDICATIONS  (STANDING):  ALPRAZolam 0.5 milliGRAM(s) Oral at bedtime  amLODIPine   Tablet 10 milliGRAM(s) Oral daily  aspirin enteric coated 81 milliGRAM(s) Oral daily  chlorhexidine 2% Cloths 1 Application(s) Topical <User Schedule>  cholecalciferol 1000 Unit(s) Oral daily  epoetin beverley-epbx (RETACRIT) Injectable 4000 Unit(s) IV Push <User Schedule>  folic acid 1 milliGRAM(s) Oral daily  hydrALAZINE 50 milliGRAM(s) Oral three times a day  insulin lispro (ADMELOG) corrective regimen sliding scale   SubCutaneous three times a day before meals  insulin lispro (ADMELOG) corrective regimen sliding scale   SubCutaneous at bedtime  labetalol 100 milliGRAM(s) Oral two times a day  metoclopramide 5 milliGRAM(s) Oral three times a day  nortriptyline 10 milliGRAM(s) Oral at bedtime  pantoprazole  Injectable 40 milliGRAM(s) IV Push every 12 hours  sevelamer carbonate 2400 milliGRAM(s) Oral three times a day with meals  simvastatin 40 milliGRAM(s) Oral at bedtime  sucralfate suspension 1 Gram(s) Oral four times a day    MEDICATIONS  (PRN):  acetaminophen     Tablet .. 650 milliGRAM(s) Oral every 6 hours PRN Temp greater or equal to 38C (100.4F)  ondansetron Injectable 4 milliGRAM(s) IV Push once PRN Nausea and/or Vomiting  oxycodone    5 mG/acetaminophen 325 mG 1 Tablet(s) Oral every 8 hours PRN Severe Pain (7 - 10)  zolpidem 5 milliGRAM(s) Oral at bedtime PRN Insomnia      __________________________________________________  REVIEW OF SYSTEMS:    CONSTITUTIONAL: No fever,   EYES: no acute visual disturbances  NECK: No pain or stiffness  RESPIRATORY: No cough; No shortness of breath  CARDIOVASCULAR: No chest pain, no palpitations  GASTROINTESTINAL: No pain. No nausea or vomiting; No diarrhea   NEUROLOGICAL: No headache or numbness, no tremors  MUSCULOSKELETAL: No joint pain, no muscle pain  GENITOURINARY: no dysuria, no frequency, no hesitancy  PSYCHIATRY: no depression , no anxiety  ALL OTHER  ROS negative        Vital Signs Last 24 Hrs  T(C): 37.3 (10 Nov 2021 13:42), Max: 37.3 (10 Nov 2021 13:42)  T(F): 99.2 (10 Nov 2021 13:42), Max: 99.2 (10 Nov 2021 13:42)  HR: 93 (10 Nov 2021 13:42) (85 - 95)  BP: 116/62 (10 Nov 2021 13:42) (116/62 - 151/77)  BP(mean): --  RR: 20 (10 Nov 2021 13:42) (17 - 20)  SpO2: 93% (10 Nov 2021 13:42) (93% - 98%)    ________________________________________________  PHYSICAL EXAM:  GENERAL: NAD  HEENT: Normocephalic;  conjunctivae and sclerae clear; moist mucous membranes;   NECK : supple  CHEST/LUNG: Clear to auscultation bilaterally with good air entry   HEART: S1 S2  regular; no murmurs, gallops or rubs  ABDOMEN: Soft, Nontender, Nondistended; Bowel sounds present  EXTREMITIES: no cyanosis; no edema; no calf tenderness  SKIN: warm and dry; no rash  NERVOUS SYSTEM:  Awake and alert; Oriented  to place, person and time ; no new deficits    _________________________________________________  LABS:                        9.5    5.95  )-----------( 92       ( 09 Nov 2021 17:54 )             30.2     11-09    136  |  98  |  52<H>  ----------------------------<  178<H>  4.4   |  26  |  13.80<H>    Ca    7.4<L>      09 Nov 2021 17:54    TPro  6.2  /  Alb  2.7<L>  /  TBili  0.6  /  DBili  x   /  AST  7<L>  /  ALT  13  /  AlkPhos  65  11-09        CAPILLARY BLOOD GLUCOSE      POCT Blood Glucose.: 187 mg/dL (10 Nov 2021 11:39)  POCT Blood Glucose.: 151 mg/dL (10 Nov 2021 06:27)  POCT Blood Glucose.: 185 mg/dL (10 Nov 2021 00:08)  POCT Blood Glucose.: 143 mg/dL (09 Nov 2021 22:00)  POCT Blood Glucose.: 179 mg/dL (09 Nov 2021 17:25)        RADIOLOGY & ADDITIONAL TESTS:    Imaging Personally Reviewed:  YES/NO    Consultant(s) Notes Reviewed:   YES/ No    Care Discussed with Consultants :     Plan of care was discussed with patient and /or primary care giver; all questions and concerns were addressed and care was aligned with patient's wishes.     NP Note discussed with  Primary Attending    Patient is a 61y old  Male who presents with a chief complaint of Hematemesis (09 Nov 2021 18:40)      INTERVAL HPI/OVERNIGHT EVENTS: no new complaints    MEDICATIONS  (STANDING):  ALPRAZolam 0.5 milliGRAM(s) Oral at bedtime  amLODIPine   Tablet 10 milliGRAM(s) Oral daily  aspirin enteric coated 81 milliGRAM(s) Oral daily  chlorhexidine 2% Cloths 1 Application(s) Topical <User Schedule>  cholecalciferol 1000 Unit(s) Oral daily  epoetin beverley-epbx (RETACRIT) Injectable 4000 Unit(s) IV Push <User Schedule>  folic acid 1 milliGRAM(s) Oral daily  hydrALAZINE 50 milliGRAM(s) Oral three times a day  insulin lispro (ADMELOG) corrective regimen sliding scale   SubCutaneous three times a day before meals  insulin lispro (ADMELOG) corrective regimen sliding scale   SubCutaneous at bedtime  labetalol 100 milliGRAM(s) Oral two times a day  metoclopramide 5 milliGRAM(s) Oral three times a day  nortriptyline 10 milliGRAM(s) Oral at bedtime  pantoprazole  Injectable 40 milliGRAM(s) IV Push every 12 hours  sevelamer carbonate 2400 milliGRAM(s) Oral three times a day with meals  simvastatin 40 milliGRAM(s) Oral at bedtime  sucralfate suspension 1 Gram(s) Oral four times a day    MEDICATIONS  (PRN):  acetaminophen     Tablet .. 650 milliGRAM(s) Oral every 6 hours PRN Temp greater or equal to 38C (100.4F)  ondansetron Injectable 4 milliGRAM(s) IV Push once PRN Nausea and/or Vomiting  oxycodone    5 mG/acetaminophen 325 mG 1 Tablet(s) Oral every 8 hours PRN Severe Pain (7 - 10)  zolpidem 5 milliGRAM(s) Oral at bedtime PRN Insomnia      __________________________________________________  REVIEW OF SYSTEMS:    CONSTITUTIONAL: No fever,   EYES: no acute visual disturbances  NECK: No pain or stiffness  RESPIRATORY: No cough; No shortness of breath  CARDIOVASCULAR: No chest pain, no palpitations  GASTROINTESTINAL: No pain. No nausea or vomiting; No diarrhea   NEUROLOGICAL: No headache or numbness, no tremors  MUSCULOSKELETAL: No joint pain, no muscle pain  GENITOURINARY: no dysuria, no frequency, no hesitancy  PSYCHIATRY: no depression , no anxiety  ALL OTHER  ROS negative        Vital Signs Last 24 Hrs  T(C): 37.3 (10 Nov 2021 13:42), Max: 37.3 (10 Nov 2021 13:42)  T(F): 99.2 (10 Nov 2021 13:42), Max: 99.2 (10 Nov 2021 13:42)  HR: 93 (10 Nov 2021 13:42) (85 - 95)  BP: 116/62 (10 Nov 2021 13:42) (116/62 - 151/77)  BP(mean): --  RR: 20 (10 Nov 2021 13:42) (17 - 20)  SpO2: 93% (10 Nov 2021 13:42) (93% - 98%)    ________________________________________________  PHYSICAL EXAM:  GENERAL: NAD  HEENT: Normocephalic;  conjunctivae and sclerae clear; moist mucous membranes;   NECK : supple  CHEST/LUNG: Clear to auscultation bilaterally with good air entry   HEART: S1 S2  regular; no murmurs, gallops or rubs  ABDOMEN: Soft, Nontender, Nondistended; Bowel sounds present  EXTREMITIES: no cyanosis; no edema; no calf tenderness RUE - AVF with thrills and bruits  SKIN: warm and dry; no rash  NERVOUS SYSTEM:  Awake and alert; Oriented  to place, person and time ; no new deficits    _________________________________________________  LABS:                        9.5    5.95  )-----------( 92       ( 09 Nov 2021 17:54 )             30.2     11-09    136  |  98  |  52<H>  ----------------------------<  178<H>  4.4   |  26  |  13.80<H>    Ca    7.4<L>      09 Nov 2021 17:54    TPro  6.2  /  Alb  2.7<L>  /  TBili  0.6  /  DBili  x   /  AST  7<L>  /  ALT  13  /  AlkPhos  65  11-09        CAPILLARY BLOOD GLUCOSE      POCT Blood Glucose.: 187 mg/dL (10 Nov 2021 11:39)  POCT Blood Glucose.: 151 mg/dL (10 Nov 2021 06:27)  POCT Blood Glucose.: 185 mg/dL (10 Nov 2021 00:08)  POCT Blood Glucose.: 143 mg/dL (09 Nov 2021 22:00)  POCT Blood Glucose.: 179 mg/dL (09 Nov 2021 17:25)        RADIOLOGY & ADDITIONAL TESTS:  < from: CT Abdomen and Pelvis No Cont (11.09.21 @ 16:42) >  EXAM:  CT ABDOMEN AND PELVIS                            PROCEDURE DATE:  11/09/2021          INTERPRETATION:  CLINICAL INFORMATION: Hematemesis.    COMPARISON: Noncontrast CT of the abdomen and pelvis dated January 17, 2021, noncontrast CT of the thorax dated November 27, 2020.    CONTRAST/COMPLICATIONS:  IV Contrast: NONE  Oral Contrast: NONE  Complications: None reported at time of study completion    PROCEDURE:  CT of the Chest, Abdomen and Pelvis was performed.  Sagittal and coronal reformats were performed.    FINDINGS:  CHEST:  LUNGS AND LARGE AIRWAYS: Patent central airways. Mild bilateral lower lobe dependent atelectasis. No discrete pulmonary nodule or confluent airspace opacity is identified in the aerated lungs.  PLEURA: Trace layering bilateral pleural fluid. No pneumothorax or pneumomediastinum.  VESSELS: Within normal limits. There is a stent in the right axillary vein.  HEART: Heart size is normal. Trace pericardial effusion.  MEDIASTINUM AND PADMINI: No lymphadenopathy. There is moderate concentric wall thickening of the mildly patulous esophagus. No intramural gas is identified.  CHEST WALL AND LOWER NECK: Within normal limits.    ABDOMEN AND PELVIS:  LIVER: Within normal limits.  BILE DUCTS: Normal caliber.  GALLBLADDER: Within normal limits.  SPLEEN: Within normal limits.  PANCREAS: Within normal limits.  ADRENALS: Low-attenuation thickening of the bilateral adrenal glands, compatible with adenomatous hyperplasia.  KIDNEYS/URETERS: The kidneys are atrophic. Thereare small bilateral renal cysts.    BLADDER: Concentric wall thickening of the minimally distended bladder..  REPRODUCTIVE ORGANS: The prostate is prominent.    BOWEL: Colonic diverticulosis. No bowel obstruction. Appendix is normal.  PERITONEUM: No ascites.  VESSELS: Within normal limits.  RETROPERITONEUM/LYMPH NODES: No lymphadenopathy.  ABDOMINAL WALL: Within normal limits.  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    IMPRESSION: No evidence of acute inflammatory or obstructive process in the abdomen and pelvis. Diffusely thickened and mildly patulous esophagus, similar to November 2020. Bilateral lower lobe dependent atelectasis.    --- End of Report ---            MARIA D ABRRIGA MD; Attending Radiologist  This document has beenelectronically signed. Nov 9 2021  5:33PM    < end of copied text >  < from: Xray Chest 1 View-PORTABLE IMMEDIATE (Xray Chest 1 View-PORTABLE IMMEDIATE .) (11.07.21 @ 23:48) >    EXAM:  XR CHEST PORTABLE IMMED 1V                            PROCEDURE DATE:  11/07/2021          INTERPRETATION:  INDICATION: cough    PRIORS: 1/28/2021    VIEWS: Portable AP radiography of the chest performed. Evaluation limited secondary to shallow inspiration and portable technique.    FINDINGS: Heart size cannot be quantitated on this portable evaluation. No hilar or superior mediastinal abnormalities are identified. Interstitial and airspace opacities are suggested bilaterally. No pleuraleffusion. No pneumothorax. No mediastinal shift. Stent graft material is identified overlying the right axillary region.    IMPRESSION: Interstitial and airspace opacities are suggested bilaterally. Clinical correlation and follow-up suggested.    --- End of Report ---            JEIMY TATE MD; Attending Radiologist  This document has been electronically signed. Nov 8 2021  9:12AM    < end of copied text >    Imaging Personally Reviewed:  YES  Consultant(s) Notes Reviewed:   YES    Care Discussed with Consultants : GI    Plan of care was discussed with patient and /or primary care giver; all questions and concerns were addressed and care was aligned with patient's wishes.

## 2021-11-10 NOTE — PROGRESS NOTE ADULT - PROBLEM SELECTOR PLAN 1
p/w hematemesis w/ history of esophagitis  refusing protonix gtt  Switched from Po protonix to IV   GI, Dr Molian following   Pt refusing EGD  monitor cbc when possible; agreeable to cbc check today; 9.5/30.2  hemodynamically stable currently  Psych consulted today; pending. Follow up with brother after psych eval  Georgi Fernando [phone]  Agreeable to CT C/A/P follow up result p/w hematemesis w/ history of esophagitis  refusing protonix gtt  Switched from Po protonix to IV   GI, Dr Molina following   Pt refusing EGD  monitor cbc when possible; agreeable to cbc check today; 9.5/30.2  hemodynamically stable currently  Psych consulted today; pending. Follow up with brother after psych eval  Gerogi King [phone]  Agreeable to CT C/A/P : No evidence of acute inflammatory or obstructive process in the abdomen and pelvis. Diffusely thickened and mildly patulous esophagus, similar to November 2020

## 2021-11-10 NOTE — DISCHARGE NOTE PROVIDER - NSDCMRMEDTOKEN_GEN_ALL_CORE_FT
ALPRAZolam 0.5 mg oral tablet: 1 tab(s) orally once a day (at bedtime)  Ambien 5 mg oral tablet: 1 tab(s) orally once a day (at bedtime)  Aspirin Enteric Coated 81 mg oral delayed release tablet: 1 tab(s) orally once a day  DuoNeb 0.5 mg-2.5 mg/3 mL inhalation solution: 3 milliliter(s) inhaled 3 times a day  ferrous sulfate 325 mg (65 mg elemental iron) oral tablet: 1 tab(s) orally every other day  folic acid 1 mg oral tablet: 1 tab(s) orally once a day  hydrALAZINE 50 mg oral tablet: 1 tab(s) orally 3 times a day  insulin regular 100 units/mL human recombinant injectable solution: injectable 2 times a day  labetalol 100 mg oral tablet: 1 tab(s) orally 2 times a day  Lokelma 10 g oral powder for reconstitution: 10 gram(s) orally every sunday  Norvasc 10 mg oral tablet: 1 tab(s) orally once a day  Pamelor 10 mg oral capsule: 1 cap(s) orally once a day (at bedtime)  Percocet 5/325 oral tablet: 1 tab(s) orally 3 times a day, As Needed  Reglan 5 mg oral tablet: 1 tab(s) orally 3 times a day (with meals)  Polina-Dior oral tablet: 1 tab(s) orally once a day  sevelamer carbonate 2.4 g oral powder for reconstitution: 1 each orally 3 times a day (with meals)  Vitamin D3 25 mcg (1000 intl units) oral tablet: 1 tab(s) orally once a day  Zocor 40 mg oral tablet: 1 tab(s) orally once a day (at bedtime)   acetaminophen 325 mg oral tablet: 2 tab(s) orally every 6 hours, As needed, Temp greater or equal to 38C (100.4F)  ALPRAZolam 0.5 mg oral tablet: 1 tab(s) orally once a day (at bedtime)  amLODIPine 10 mg oral tablet: 1 tab(s) orally once a day  aspirin 81 mg oral delayed release tablet: 1 tab(s) orally once a day  cholecalciferol oral tablet: 1000 unit(s) orally once a day  ferrous sulfate 325 mg (65 mg elemental iron) oral tablet: 1 tab(s) orally every other day  folic acid 1 mg oral tablet: 1 tab(s) orally once a day  hydrALAZINE 50 mg oral tablet: 1 tab(s) orally 3 times a day  insulin regular 100 units/mL human recombinant injectable solution: 1 unit(s) injectable 2 times a day x 30 days   labetalol 100 mg oral tablet: 1 tab(s) orally 2 times a day  Lokelma 10 g oral powder for reconstitution: 10 gram(s) orally once a day  every sunday   metoclopramide 5 mg oral tablet: 1 tab(s) orally 3 times a day  nortriptyline 10 mg oral capsule: 1 cap(s) orally once a day (at bedtime)  oxycodone-acetaminophen 5 mg-325 mg oral tablet: 1 tab(s) orally every 8 hours, As needed, Severe Pain (7 - 10)  pantoprazole 40 mg oral delayed release tablet: 1 tab(s) orally every 12 hours  Polina-Dior oral tablet: 1 tab(s) orally once a day  sevelamer carbonate 800 mg oral tablet: 3 tab(s) orally 3 times a day (with meals)  simvastatin 40 mg oral tablet: 1 tab(s) orally once a day (at bedtime)  sucralfate 1 g/10 mL oral suspension: 10 milliliter(s) orally 4 times a day  zolpidem 5 mg oral tablet: 1 tab(s) orally once a day (at bedtime), As needed, Insomnia   albuterol 90 mcg/inh inhalation aerosol: 2 puff(s) inhaled every 6 hours, As needed, Shortness of Breath and/or Wheezing  amLODIPine 10 mg oral tablet: 1 tab(s) orally once a day  cholecalciferol oral tablet: 1000 unit(s) orally once a day  ferrous sulfate 325 mg (65 mg elemental iron) oral tablet: 1 tab(s) orally every other day  folic acid 1 mg oral tablet: 1 tab(s) orally once a day  insulin glargine: 6 unit(s) subcutaneously once a day (at bedtime)   insulin lispro 100 units/mL injectable solution: 6  injectable 3 times a day  Lokelma 10 g oral powder for reconstitution: 10 gram(s) orally once a day   metoclopramide 5 mg oral tablet: 1 tab(s) orally 3 times a day  nortriptyline 10 mg oral capsule: 1 cap(s) orally once a day (at bedtime)  pantoprazole 40 mg oral delayed release tablet: 1 tab(s) orally every 12 hours  Polina-Dior oral tablet: 1 tab(s) orally once a day  sevelamer carbonate 800 mg oral tablet: 3 tab(s) orally 3 times a day (with meals)  simvastatin 40 mg oral tablet: 1 tab(s) orally once a day (at bedtime)  sucralfate 1 g/10 mL oral suspension: 10 milliliter(s) orally 4 times a day  zolpidem 5 mg oral tablet: 1 tab(s) orally once a day (at bedtime), As needed, Insomnia

## 2021-11-10 NOTE — PROGRESS NOTE ADULT - PROBLEM SELECTOR PLAN 5
Hb 11.7--> 9.5  Agreed to  blood work today  Continue protonix and carafate  monitor cbc when possible  hemodynamically stable currently  F/u with GI
Hb 11.7--> 9.5  Agreed to  blood work today  Continue protonix and carafate  monitor cbc when possible  hemodynamically stable currently  F/u with GI

## 2021-11-10 NOTE — DISCHARGE NOTE PROVIDER - NSDCCPCAREPLAN_GEN_ALL_CORE_FT
PRINCIPAL DISCHARGE DIAGNOSIS  Diagnosis: Hematemesis  Assessment and Plan of Treatment:       SECONDARY DISCHARGE DIAGNOSES  Diagnosis: Hypoglycemia  Assessment and Plan of Treatment:      PRINCIPAL DISCHARGE DIAGNOSIS  Diagnosis: Hematemesis  Assessment and Plan of Treatment:       SECONDARY DISCHARGE DIAGNOSES  Diagnosis: Hypertension  Assessment and Plan of Treatment: Low salt diet  Activity as tolerated.  Take all medication as prescribed.  Follow up with your medical doctor for routine blood pressure monitoring   Notify your doctor if you have any of the following symptoms:   Dizziness, Lightheadedness, Blurry vision, Headache, Chest pain, Shortness of breath      Diagnosis: Anemia  Assessment and Plan of Treatment: Symptoms to report:  bleeding, palpitations, fatigue, pale skin, cold skin, dizziness. Take medications as ordered by PCP      Diagnosis: HLD (hyperlipidemia)  Assessment and Plan of Treatment: Follow up with PCP for treatment goals, continue medication, have liver function testing every 3 months as anti lipid medications can cause liver irritation, eat low fat, low cholesterol meals      Diagnosis: DM (diabetes mellitus)  Assessment and Plan of Treatment: HgA1C this admission was   Make sure you get your HgA1c checked every three months.  If you take oral diabetes medications, check your blood glucose two times a day.  If you take insulin, check your blood glucose before meals and at bedtime.  It's important not to skip any meals.  Keep a log of your blood glucose results and always take it with you to your doctor appointments.  Keep a list of your current medications including injectables and over the counter medications and bring this medication list with you to all your doctor appointments.  If you have not seen your ophthalmologist this year call for appointment.  Check your feet daily for redness, sores, or openings. Do not self treat. If no improvement in two days call your primary care physician for an appointment.  Low blood sugar (hypoglycemia) is a blood sugar below 70mg/dl. Check your blood sugar if you feel signs/symptoms of hypoglycemia. If your blood sugar is below 70 take 15 grams of carbohydrates (ex 4 oz of apple juice, 3-4 glucose tablets, or 4-6 oz of regular soda) wait 15 minutes and repeat blood sugar to make sure it comes up above 70.  If your blood sugar is above 70 and you are due for a meal, have a meal.  If you are not due for a meal have a snack.  This snack helps keeps your blood sugar at a safe range.      Diagnosis: ESRD (end stage renal disease)  Assessment and Plan of Treatment: Please continue to follow your dialysis center as  reinstate your schedule  follow up with your primary provider and nephrologist for further care and monitoring of kidney function and electrolytes. Continue a renal restricted diet (Avoiding foods high in potassium and phosphorus), your prescribed medications, and supplementations as directed.       Diagnosis: Hypoglycemia  Assessment and Plan of Treatment:      PRINCIPAL DISCHARGE DIAGNOSIS  Diagnosis: Hematemesis  Assessment and Plan of Treatment: you were evaluated for Hematemesis.   please continue to take Cafarate liquid form, not in pill form.      SECONDARY DISCHARGE DIAGNOSES  Diagnosis: Hypertension  Assessment and Plan of Treatment: Low salt diet  Activity as tolerated.  Take all medication as prescribed.  Follow up with your medical doctor for routine blood pressure monitoring   Notify your doctor if you have any of the following symptoms:   Dizziness, Lightheadedness, Blurry vision, Headache, Chest pain, Shortness of breath      Diagnosis: Anemia  Assessment and Plan of Treatment: Symptoms to report:  bleeding, palpitations, fatigue, pale skin, cold skin, dizziness. Take medications as ordered by PCP      Diagnosis: HLD (hyperlipidemia)  Assessment and Plan of Treatment: Follow up with PCP for treatment goals, continue medication, have liver function testing every 3 months as anti lipid medications can cause liver irritation, eat low fat, low cholesterol meals      Diagnosis: DM (diabetes mellitus)  Assessment and Plan of Treatment: Your last HgA1C was 5.9    Make sure you get your HgA1c checked every three months.  If you take oral diabetes medications, check your blood glucose two times a day.  If you take insulin, check your blood glucose before meals and at bedtime.  It's important not to skip any meals.  Keep a log of your blood glucose results and always take it with you to your doctor appointments.  Keep a list of your current medications including injectables and over the counter medications and bring this medication list with you to all your doctor appointments.  If you have not seen your ophthalmologist this year call for appointment.  Check your feet daily for redness, sores, or openings. Do not self treat. If no improvement in two days call your primary care physician for an appointment.  Low blood sugar (hypoglycemia) is a blood sugar below 70mg/dl. Check your blood sugar if you feel signs/symptoms of hypoglycemia. If your blood sugar is below 70 take 15 grams of carbohydrates (ex 4 oz of apple juice, 3-4 glucose tablets, or 4-6 oz of regular soda) wait 15 minutes and repeat blood sugar to make sure it comes up above 70.  If your blood sugar is above 70 and you are due for a meal, have a meal.  If you are not due for a meal have a snack.  This snack helps keeps your blood sugar at a safe range.      Diagnosis: ESRD (end stage renal disease)  Assessment and Plan of Treatment: Please continue to follow up at your dialysis center   follow up with your primary provider and nephrologist for further care and monitoring of kidney function and electrolytes. Continue a renal restricted diet (Avoiding foods high in potassium and phosphorus), your prescribed medications, and supplementations as directed.       Diagnosis: Hypoglycemia  Assessment and Plan of Treatment:      PRINCIPAL DISCHARGE DIAGNOSIS  Diagnosis: Hematemesis  Assessment and Plan of Treatment: you were evaluated for Hematemesis.   please continue to take Cafarate liquid form, not in pill form.      SECONDARY DISCHARGE DIAGNOSES  Diagnosis: Hypertension  Assessment and Plan of Treatment: Low salt diet  Activity as tolerated.  Take all medication as prescribed.  Follow up with your medical doctor for routine blood pressure monitoring   Notify your doctor if you have any of the following symptoms:   Dizziness, Lightheadedness, Blurry vision, Headache, Chest pain, Shortness of breath      Diagnosis: Anemia  Assessment and Plan of Treatment: Symptoms to report:  bleeding, palpitations, fatigue, pale skin, cold skin, dizziness. Take medications as ordered by PCP      Diagnosis: HLD (hyperlipidemia)  Assessment and Plan of Treatment: Follow up with PCP for treatment goals, continue medication, have liver function testing every 3 months as anti lipid medications can cause liver irritation, eat low fat, low cholesterol meals      Diagnosis: DM (diabetes mellitus)  Assessment and Plan of Treatment: Your last HgA1C was 5.9    Make sure you get your HgA1c checked every three months.  If you take oral diabetes medications, check your blood glucose two times a day.  If you take insulin, check your blood glucose before meals and at bedtime.  It's important not to skip any meals.  Keep a log of your blood glucose results and always take it with you to your doctor appointments.  Keep a list of your current medications including injectables and over the counter medications and bring this medication list with you to all your doctor appointments.  If you have not seen your ophthalmologist this year call for appointment.  Check your feet daily for redness, sores, or openings. Do not self treat. If no improvement in two days call your primary care physician for an appointment.  Low blood sugar (hypoglycemia) is a blood sugar below 70mg/dl. Check your blood sugar if you feel signs/symptoms of hypoglycemia. If your blood sugar is below 70 take 15 grams of carbohydrates (ex 4 oz of apple juice, 3-4 glucose tablets, or 4-6 oz of regular soda) wait 15 minutes and repeat blood sugar to make sure it comes up above 70.  If your blood sugar is above 70 and you are due for a meal, have a meal.  If you are not due for a meal have a snack.  This snack helps keeps your blood sugar at a safe range.      Diagnosis: ESRD (end stage renal disease)  Assessment and Plan of Treatment: Please continue to follow up at your dialysis center   follow up with your primary provider and nephrologist for further care and monitoring of kidney function and electrolytes. Continue a renal restricted diet (Avoiding foods high in potassium and phosphorus), your prescribed medications, and supplementations as directed.       Diagnosis: Hypoglycemia  Assessment and Plan of Treatment: you had an episode of hypoglycemia while in the hospital.   Low blood sugar (hypoglycemia) is a blood sugar below 70mg/dl. Check your blood sugar if you feel signs/symptoms of hypoglycemia. If your blood sugar is below 70 take 15 grams of carbohydrates (ex 4 oz of apple juice, 3-4 glucose tablets, or 4-6 oz of regular soda) wait 15 minutes and repeat blood sugar to make sure it comes up above 70.  If your blood sugar is above 70 and you are due for a meal, have a meal.  If you are not due for a meal have a snack.  This snack helps keeps your blood sugar at a safe range.       PRINCIPAL DISCHARGE DIAGNOSIS  Diagnosis: Hematemesis  Assessment and Plan of Treatment: you were evaluated for Hematemesis.  Likely due to esophagitis per CT abdomen on 11/9   as  you refused to have endoscopy we have no accurate clue but likely esophagitis  associated with fluids overload from non- compliance with hemodialysis   GI specialist recommended you to continue to take ONLY   Liquid form of  Cafarate liquid form, not in pill form.      SECONDARY DISCHARGE DIAGNOSES  Diagnosis: Hypoglycemia  Assessment and Plan of Treatment: you had an episode of hypoglycemia while in the hospital.   Low blood sugar (hypoglycemia) is a blood sugar below 70mg/dl. Check your blood sugar if you feel signs/symptoms of hypoglycemia. If your blood sugar is below 70 take 15 grams of carbohydrates (ex 4 oz of apple juice, 3-4 glucose tablets, or 4-6 oz of regular soda) wait 15 minutes and repeat blood sugar to make sure it comes up above 70.  If your blood sugar is above 70 and you are due for a meal, have a meal.  If you are not due for a meal have a snack.  This snack helps keeps your blood sugar at a safe range.      Diagnosis: DM (diabetes mellitus)  Assessment and Plan of Treatment: Your last HgA1C was 5.9    Make sure you get your HgA1c checked every three months.  If you take oral diabetes medications, check your blood glucose two times a day.  If you take insulin, check your blood glucose before meals and at bedtime.  It's important not to skip any meals.  Keep a log of your blood glucose results and always take it with you to your doctor appointments.  Keep a list of your current medications including injectables and over the counter medications and bring this medication list with you to all your doctor appointments.  If you have not seen your ophthalmologist this year call for appointment.  Check your feet daily for redness, sores, or openings. Do not self treat. If no improvement in two days call your primary care physician for an appointment.  Low blood sugar (hypoglycemia) is a blood sugar below 70mg/dl. Check your blood sugar if you feel signs/symptoms of hypoglycemia. If your blood sugar is below 70 take 15 grams of carbohydrates (ex 4 oz of apple juice, 3-4 glucose tablets, or 4-6 oz of regular soda) wait 15 minutes and repeat blood sugar to make sure it comes up above 70.  If your blood sugar is above 70 and you are due for a meal, have a meal.  If you are not due for a meal have a snack.  This snack helps keeps your blood sugar at a safe range.      Diagnosis: ESRD (end stage renal disease)  Assessment and Plan of Treatment: Please continue to follow up at your dialysis center   follow up with your primary provider and nephrologist for further care and monitoring of kidney function and electrolytes. Continue a renal restricted diet (Avoiding foods high in potassium and phosphorus), your prescribed medications, and supplementations as directed.   Last Hemodialysis was 11/11

## 2021-11-10 NOTE — PROGRESS NOTE ADULT - ASSESSMENT
61M w/ PMHx of DM, HTN, GERD, CAD, ESRD on hemodialysis, DM retinopathy from [facility] assisted living,  presents to the ED with hematemesis. Patient reported that he started vomiting blood 11/7 afternoon admitted for hematemesis; seen by GI however patient declining EGD/ w/u. Today, he agreed to HD w/ labs at HD. Attending in to discuss with patient and he is now agreeing to CT C/A/P. Continued on Protonix ( IV) and Carafate. Diet advanced to soft as no further hematemesis inpatient 61M w/ PMHx of DM, HTN, GERD, CAD, ESRD on hemodialysis, DM retinopathy from [facility] assisted living,  presents to the ED with hematemesis. Patient reported that he started vomiting blood 11/7 afternoon admitted for hematemesis; seen by GI however patient declining EGD/ w/u.  s/p HD 11/8, will get HD tomorrow and if tolerated regular diet will plan is d/c back to [facility] tomorrow.  CT a/P obtained  as an alternative  2/2 refusal of EGD, unremarkable finding, continue PPI and carafate, advanced diet to regular.

## 2021-11-10 NOTE — PROGRESS NOTE ADULT - PROBLEM SELECTOR PLAN 2
Pt commonly misses dialysis  Last hd reportedly was 11/6  Agreeable to HD today   c/w sevelamer  Nephro Dr Njeru Pt commonly misses dialysis  Last hd reportedly was 11/6  Agreeable to HD 11/8 which tolerated well  c/w alexandr Mosher  Plan for f/u HD in AM then d/c back to [facility] if he tolerated regular diet well without GI symptoms   HD unit made aware of plan

## 2021-11-10 NOTE — PROGRESS NOTE ADULT - PROBLEM SELECTOR PLAN 8
likely d/c to [facility] tomorrow after HD  COVID PRC will be tested tomorrow AM likely d/c to [facility] tomorrow after HD  COVID PCR will be tested tomorrow AM  spoke with brother Georgi King at [phone]  this is correct number - updated POC and agreed

## 2021-11-10 NOTE — PHARMACOTHERAPY INTERVENTION NOTE - COMMENTS
IV protonix bid day #2, recommended to consider PO switch as pt qualifies; as per NP GI wants IV for now as narrowing of esophagus and pt still has trouble swallowing, refused GI procedure
IV protonix bid and carafate both on board, day #2, recommended to consider d/c protonix as potential decrease in efficacy of carafate; as per NP GI wants both

## 2021-11-10 NOTE — PROGRESS NOTE ADULT - PROBLEM SELECTOR PLAN 3
Monitor BP  c/w home meds - labetalol, amlodipine, hydralazine
Monitor BP  c/w home meds - labetalol, amlodipine, hydralazine

## 2021-11-10 NOTE — DISCHARGE NOTE PROVIDER - HOSPITAL COURSE
61M w/ PMHx of DM, HTN, GERD, CAD, ESRD on hemodialysis, DM retinopathy from [facility] assisted living,  presents to the ED with hematemesis. Patient reported that he started vomiting blood 11/7 afternoon admitted for hematemesis; seen by GI however patient declining EGD/ w/u.  s/p HD 11/8, will get HD tomorrow and trialed for regular diet.  CT a/P obtained  as an alternative  2/2 refusal of EGD, unremarkable finding, continue PPI and Carafate (liquid form) advanced diet to regular.   Given patient's improved clinical status and current hemodynamic stability decision was made to discharge. Pt is stable for discharge per attending and is advised to f/u with PCP as out-patient. Please refer to Pt's complete medical chart with documents for a full hospital course, for this is only a brief summary.

## 2021-11-10 NOTE — DISCHARGE NOTE PROVIDER - NSDCFUSCHEDAPPT_GEN_ALL_CORE_FT
KIAN VALERO ; 01/24/2022 ; [facility] [address] ; 01/24/2022 ; [facility] [address] KIAN VALERO ; 01/24/2022 ; [facility] [address] ; 01/24/2022 ; [facility] [address] ; 02/09/2022 ; NP Derm [id number]

## 2021-11-10 NOTE — PROGRESS NOTE ADULT - SUBJECTIVE AND OBJECTIVE BOX
Patient is a 61y old  Male who presents with a chief complaint of Hematemesis (10 Nov 2021 15:50)    PATIENT IS SEEN AND EXAMINED IN MEDICAL FLOOR.  NGT [    ]    JEREMY [   ]      GT [   ]    ALLERGIES:  fish (Rash)  liver (Anaphylaxis)  No Known Drug Allergies      Daily     Daily     VITALS:    Vital Signs Last 24 Hrs  T(C): 36.9 (10 Nov 2021 21:12), Max: 37.3 (10 Nov 2021 13:42)  T(F): 98.5 (10 Nov 2021 21:12), Max: 99.2 (10 Nov 2021 13:42)  HR: 93 (10 Nov 2021 21:12) (92 - 93)  BP: 136/73 (10 Nov 2021 21:12) (116/62 - 151/77)  BP(mean): --  RR: 20 (10 Nov 2021 21:12) (18 - 20)  SpO2: 97% (10 Nov 2021 21:12) (93% - 98%)    LABS:    CBC Full  -  ( 09 Nov 2021 17:54 )  WBC Count : 5.95 K/uL  RBC Count : 3.44 M/uL  Hemoglobin : 9.5 g/dL  Hematocrit : 30.2 %  Platelet Count - Automated : 92 K/uL  Mean Cell Volume : 87.8 fl  Mean Cell Hemoglobin : 27.6 pg  Mean Cell Hemoglobin Concentration : 31.5 gm/dL  Auto Neutrophil # : 5.23 K/uL  Auto Lymphocyte # : 0.39 K/uL  Auto Monocyte # : 0.22 K/uL  Auto Eosinophil # : 0.09 K/uL  Auto Basophil # : 0.00 K/uL  Auto Neutrophil % : 87.9 %  Auto Lymphocyte % : 6.6 %  Auto Monocyte % : 3.7 %  Auto Eosinophil % : 1.5 %  Auto Basophil % : 0.0 %      11-09    136  |  98  |  52<H>  ----------------------------<  178<H>  4.4   |  26  |  13.80<H>    Ca    7.4<L>      09 Nov 2021 17:54    TPro  6.2  /  Alb  2.7<L>  /  TBili  0.6  /  DBili  x   /  AST  7<L>  /  ALT  13  /  AlkPhos  65  11-09    CAPILLARY BLOOD GLUCOSE      POCT Blood Glucose.: 128 mg/dL (10 Nov 2021 21:07)  POCT Blood Glucose.: 135 mg/dL (10 Nov 2021 16:44)  POCT Blood Glucose.: 187 mg/dL (10 Nov 2021 11:39)  POCT Blood Glucose.: 151 mg/dL (10 Nov 2021 06:27)  POCT Blood Glucose.: 185 mg/dL (10 Nov 2021 00:08)  POCT Blood Glucose.: 143 mg/dL (09 Nov 2021 22:00)        LIVER FUNCTIONS - ( 09 Nov 2021 17:54 )  Alb: 2.7 g/dL / Pro: 6.2 g/dL / ALK PHOS: 65 U/L / ALT: 13 U/L DA / AST: 7 U/L / GGT: x           Creatinine Trend: 13.80<--, 8.89<--  I&O's Summary              MEDICATIONS:    MEDICATIONS  (STANDING):  ALPRAZolam 0.5 milliGRAM(s) Oral at bedtime  amLODIPine   Tablet 10 milliGRAM(s) Oral daily  aspirin enteric coated 81 milliGRAM(s) Oral daily  chlorhexidine 2% Cloths 1 Application(s) Topical <User Schedule>  cholecalciferol 1000 Unit(s) Oral daily  epoetin beverley-epbx (RETACRIT) Injectable 4000 Unit(s) IV Push <User Schedule>  folic acid 1 milliGRAM(s) Oral daily  hydrALAZINE 50 milliGRAM(s) Oral three times a day  insulin lispro (ADMELOG) corrective regimen sliding scale   SubCutaneous three times a day before meals  insulin lispro (ADMELOG) corrective regimen sliding scale   SubCutaneous at bedtime  labetalol 100 milliGRAM(s) Oral two times a day  metoclopramide 5 milliGRAM(s) Oral three times a day  nortriptyline 10 milliGRAM(s) Oral at bedtime  pantoprazole    Tablet 40 milliGRAM(s) Oral every 12 hours  sevelamer carbonate 2400 milliGRAM(s) Oral three times a day with meals  simvastatin 40 milliGRAM(s) Oral at bedtime  sucralfate suspension 1 Gram(s) Oral four times a day      MEDICATIONS  (PRN):  acetaminophen     Tablet .. 650 milliGRAM(s) Oral every 6 hours PRN Temp greater or equal to 38C (100.4F)  ondansetron Injectable 4 milliGRAM(s) IV Push once PRN Nausea and/or Vomiting  oxycodone    5 mG/acetaminophen 325 mG 1 Tablet(s) Oral every 8 hours PRN Severe Pain (7 - 10)  zolpidem 5 milliGRAM(s) Oral at bedtime PRN Insomnia      REVIEW OF SYSTEMS:                           ALL ROS DONE [ X   ]    CONSTITUTIONAL:  LETHARGIC [   ], FEVER [   ], UNRESPONSIVE [   ]  CVS:  CP  [   ], SOB, [   ], PALPITATIONS [   ], DIZZYNESS [   ]  RS: COUGH [   ], SPUTUM [   ]  GI: ABDOMINAL PAIN [   ], NAUSEA [   ], VOMITINGS [   ], DIARRHEA [   ], CONSTIPATION [   ]  :  DYSURIA [   ], NOCTURIA [   ], INCREASED FREQUENCY [   ], DRIBLING [   ],  SKELETAL: PAINFUL JOINTS [   ], SWOLLEN JOINTS [   ], NECK ACHE [   ], LOW BACK ACHE [   ],  SKIN : ULCERS [   ], RASH [   ], ITCHING [   ]  CNS: HEAD ACHE [   ], DOUBLE VISION [   ], BLURRED VISION [   ], AMS / CONFUSION [   ], SEIZURES [   ], WEAKNESS [   ],TINGLING / NUMBNESS [   ]      PHYSICAL EXAMINATION:  GENERAL APPEARANCE: NO DISTRESS  HEENT:  NO PALLOR, NO  JVD,  NO   NODES, NECK SUPPLE  CVS: S1 +, S2 +,   RS: AEEB,  OCCASIONAL  RALES +,   NO RONCHI  ABD: SOFT, NT, NO, BS +  EXT: NO PE   , LEFT BKA +  SKIN: WARM,    RIGHT UPPER EXTREMITY U/S  SKELETAL:  ROM ACCEPTABLE  CNS:  AAO X 3, NO DEFICITS  ,  VISUALLY IMPAIRED +     RADIOLOGY :    EXAM:  CT ABDOMEN AND PELVIS                            PROCEDURE DATE:  11/09/2021          INTERPRETATION:  CLINICAL INFORMATION: Hematemesis.    COMPARISON: Noncontrast CT of the abdomen and pelvis dated January 17, 2021, noncontrast CT of the thorax dated November 27, 2020.    CONTRAST/COMPLICATIONS:  IV Contrast: NONE  Oral Contrast: NONE  Complications: None reported at time of study completion    PROCEDURE:  CT of the Chest, Abdomen and Pelvis was performed.  Sagittal and coronal reformats were performed.    FINDINGS:  CHEST:  LUNGS AND LARGE AIRWAYS: Patent central airways. Mild bilateral lower lobe dependent atelectasis. No discrete pulmonary nodule or confluent airspace opacity is identified in the aerated lungs.  PLEURA: Trace layering bilateral pleural fluid. No pneumothorax or pneumomediastinum.  VESSELS: Within normal limits. There is a stent in the right axillary vein.  HEART: Heart size is normal. Trace pericardial effusion.  MEDIASTINUM AND PADMINI: No lymphadenopathy. There is moderate concentric wall thickening of the mildly patulous esophagus. No intramural gas is identified.  CHEST WALL AND LOWER NECK: Within normal limits.    ABDOMEN AND PELVIS:  LIVER: Within normal limits.  BILE DUCTS: Normal caliber.  GALLBLADDER: Within normal limits.  SPLEEN: Within normal limits.  PANCREAS: Within normal limits.  ADRENALS: Low-attenuation thickening of the bilateral adrenal glands, compatible with adenomatous hyperplasia.  KIDNEYS/URETERS: The kidneys are atrophic. Thereare small bilateral renal cysts.    BLADDER: Concentric wall thickening of the minimally distended bladder..  REPRODUCTIVE ORGANS: The prostate is prominent.    BOWEL: Colonic diverticulosis. No bowel obstruction. Appendix is normal.  PERITONEUM: No ascites.  VESSELS: Within normal limits.  RETROPERITONEUM/LYMPH NODES: No lymphadenopathy.  ABDOMINAL WALL: Within normal limits.  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    IMPRESSION: No evidence of acute inflammatory or obstructive process in the abdomen and pelvis. Diffusely thickened and mildly patulous esophagus, similar to November 2020. Bilateral lower lobe dependent atelectasis.      ASSESSMENT :     Hematemesis    No pertinent past medical history    Hypertension    Adrenal insufficiency    CKD (chronic kidney disease)    Anemia    Glaucoma    Coronary artery disease    HLD (hyperlipidemia)    Peripheral vascular disease    Spinal stenosis of lumbosacral region    Hyperparathyroidism    Diabetes mellitus    Diabetic neuropathy    Contracture of hand    Osteoarthritis    Osteoporosis    Vision loss of left eye    ESRD on hemodialysis    Cataract    BPH (benign prostatic hyperplasia)    UTI (urinary tract infection)    Bladder mass    H/O hematuria    Osteoporosis    Vision loss of right eye    Depression    Chronic GERD    Osteomyelitis of vertebra    No significant past surgical history    Below knee amputation status, left    History of right cataract extraction    History of left cataract extraction    S/P arteriovenous (AV) fistula creation    H/O hematuria    H/O transurethral destruction of bladder lesion    History of excision of mass        PLAN:  HPI:  61M w/ PMHx of DM, HTN, GERD, CAD, ESRD on hemodialysis, from [facility] presents to the ED with hematemesis. Patient reports he started vomiting blood 11/7 afternoon. Patient also notes intermittent cough for a while. Denies abdominal pain, diarrhea, fevers at home or any other acute complaint. Patient reports he does not urinate. Last HD was Saturday 11/6/21 per patient. Pt refused to be interviewed in the ED and stated that he felt fine and would like to go home. Pt refusing all IV medications. Pt refused EGD and all blood draws. No fever, chills. No abdominal pain, chest pain.  (08 Nov 2021 12:10)    # COFFEE GROUND EMESIS VS. HEMATEMESIS W/ DIAGNOSIS OF ESOPHAGITIS AND DUODENITIS [1/2021], HX OF GASTROPARESIS W/ CURRENT EVIDENCE OF THICKENED ESOPHAGUS ON CT SCAN [11/9]   - MONITORING HGB, PLACED ON PPI BID, CARAFATE, PRN ANTIEMETICS, GASTROENTEROLOGY CONSULT  - RESUMED DIET AS NO EMESIS > 36 HOURS  - PATIENT IS REFUSING EGD - COUNSELLED REGARDING MORTALITY RISK OF NOT PURSUING ENDOSCOPING EVALUATION. AGREEABLE FOR CT CHEST/ABD/PELV   - DESPITE COUNSELLING ON RISKS OF NOT EVALUATING ESOPHAGEAL AND STOMACH CHANGES W/ EGD INCLUDING POSSIBLE INFECTION, INFLAMMATION, ULCERS AND MALIGNANCY - PATIENT VERBALIZED UNDERSTANDING FOR ONGOING CHANGES THAT COULD CONTRIBUTE TO WORSENED MORTALITY OR EVEN DEATH - HE IS REFUSING EGD   - D/W PATIENT AND GASTROENTEROLOGY - WILL PLACE ON PPI BID AND SUCRALFATE QID    # SEVERE PROTEIN CALORIE MALNUTRITION, FAILURE TO THRIVE - NUTRITIONAL SUPPLEMENT  # ANEMIA OF CKD  # HLD  # ESRD ON HD TTS - W/ HX OF NONCOMPLIANCE - NEPHROLOGY CONSULT IN PROGRESS  # HTN  # DM   # PARTIALLY BLIND  # S/P LEFT BKA  # HX OF PVD  # LS SPINAL STENOSIS  # GI AND DVT PPX    SHAKIR MAC MD COVERING KUSH MAC MD

## 2021-11-10 NOTE — DISCHARGE NOTE PROVIDER - CARE PROVIDER_API CALL
Dario De La Torre)  Medicine  [id number] [place] [address]  Phone: [phone]  Fax: [phone]  Follow Up Time:

## 2021-11-11 ENCOUNTER — TRANSCRIPTION ENCOUNTER (OUTPATIENT)
Age: 61
End: 2021-11-11

## 2021-11-11 VITALS
DIASTOLIC BLOOD PRESSURE: 63 MMHG | RESPIRATION RATE: 18 BRPM | TEMPERATURE: 99 F | HEART RATE: 94 BPM | SYSTOLIC BLOOD PRESSURE: 108 MMHG | OXYGEN SATURATION: 93 %

## 2021-11-11 LAB
ANION GAP SERPL CALC-SCNC: 10 MMOL/L — SIGNIFICANT CHANGE UP (ref 5–17)
BUN SERPL-MCNC: 36 MG/DL — HIGH (ref 7–18)
CALCIUM SERPL-MCNC: 7.6 MG/DL — LOW (ref 8.4–10.5)
CHLORIDE SERPL-SCNC: 99 MMOL/L — SIGNIFICANT CHANGE UP (ref 96–108)
CO2 SERPL-SCNC: 28 MMOL/L — SIGNIFICANT CHANGE UP (ref 22–31)
CREAT SERPL-MCNC: 11.7 MG/DL — HIGH (ref 0.5–1.3)
GLUCOSE BLDC GLUCOMTR-MCNC: 124 MG/DL — HIGH (ref 70–99)
GLUCOSE BLDC GLUCOMTR-MCNC: 134 MG/DL — HIGH (ref 70–99)
GLUCOSE BLDC GLUCOMTR-MCNC: 224 MG/DL — HIGH (ref 70–99)
GLUCOSE SERPL-MCNC: 162 MG/DL — HIGH (ref 70–99)
HCT VFR BLD CALC: 31.6 % — LOW (ref 39–50)
HGB BLD-MCNC: 10 G/DL — LOW (ref 13–17)
MAGNESIUM SERPL-MCNC: 2.7 MG/DL — HIGH (ref 1.6–2.6)
MCHC RBC-ENTMCNC: 27.5 PG — SIGNIFICANT CHANGE UP (ref 27–34)
MCHC RBC-ENTMCNC: 31.6 GM/DL — LOW (ref 32–36)
MCV RBC AUTO: 86.8 FL — SIGNIFICANT CHANGE UP (ref 80–100)
MRSA PCR RESULT.: SIGNIFICANT CHANGE UP
NRBC # BLD: 0 /100 WBCS — SIGNIFICANT CHANGE UP (ref 0–0)
PHOSPHATE SERPL-MCNC: 4 MG/DL — SIGNIFICANT CHANGE UP (ref 2.5–4.5)
PLATELET # BLD AUTO: 131 K/UL — LOW (ref 150–400)
POTASSIUM SERPL-MCNC: 3.9 MMOL/L — SIGNIFICANT CHANGE UP (ref 3.5–5.3)
POTASSIUM SERPL-SCNC: 3.9 MMOL/L — SIGNIFICANT CHANGE UP (ref 3.5–5.3)
RBC # BLD: 3.64 M/UL — LOW (ref 4.2–5.8)
RBC # FLD: 19.4 % — HIGH (ref 10.3–14.5)
S AUREUS DNA NOSE QL NAA+PROBE: SIGNIFICANT CHANGE UP
SARS-COV-2 RNA SPEC QL NAA+PROBE: SIGNIFICANT CHANGE UP
SODIUM SERPL-SCNC: 137 MMOL/L — SIGNIFICANT CHANGE UP (ref 135–145)
WBC # BLD: 3.86 K/UL — SIGNIFICANT CHANGE UP (ref 3.8–10.5)
WBC # FLD AUTO: 3.86 K/UL — SIGNIFICANT CHANGE UP (ref 3.8–10.5)

## 2021-11-11 PROCEDURE — 71250 CT THORAX DX C-: CPT

## 2021-11-11 PROCEDURE — 85025 COMPLETE CBC W/AUTO DIFF WBC: CPT

## 2021-11-11 PROCEDURE — 80048 BASIC METABOLIC PNL TOTAL CA: CPT

## 2021-11-11 PROCEDURE — 83690 ASSAY OF LIPASE: CPT

## 2021-11-11 PROCEDURE — 71045 X-RAY EXAM CHEST 1 VIEW: CPT

## 2021-11-11 PROCEDURE — 99261: CPT

## 2021-11-11 PROCEDURE — 74176 CT ABD & PELVIS W/O CONTRAST: CPT

## 2021-11-11 PROCEDURE — 86901 BLOOD TYPING SEROLOGIC RH(D): CPT

## 2021-11-11 PROCEDURE — 86850 RBC ANTIBODY SCREEN: CPT

## 2021-11-11 PROCEDURE — 87641 MR-STAPH DNA AMP PROBE: CPT

## 2021-11-11 PROCEDURE — 99285 EMERGENCY DEPT VISIT HI MDM: CPT | Mod: 25

## 2021-11-11 PROCEDURE — 87640 STAPH A DNA AMP PROBE: CPT

## 2021-11-11 PROCEDURE — 83605 ASSAY OF LACTIC ACID: CPT

## 2021-11-11 PROCEDURE — 84100 ASSAY OF PHOSPHORUS: CPT

## 2021-11-11 PROCEDURE — 96375 TX/PRO/DX INJ NEW DRUG ADDON: CPT

## 2021-11-11 PROCEDURE — 85027 COMPLETE CBC AUTOMATED: CPT

## 2021-11-11 PROCEDURE — 93005 ELECTROCARDIOGRAM TRACING: CPT

## 2021-11-11 PROCEDURE — 87635 SARS-COV-2 COVID-19 AMP PRB: CPT

## 2021-11-11 PROCEDURE — 86769 SARS-COV-2 COVID-19 ANTIBODY: CPT

## 2021-11-11 PROCEDURE — 86900 BLOOD TYPING SEROLOGIC ABO: CPT

## 2021-11-11 PROCEDURE — 80053 COMPREHEN METABOLIC PANEL: CPT

## 2021-11-11 PROCEDURE — 96374 THER/PROPH/DIAG INJ IV PUSH: CPT

## 2021-11-11 PROCEDURE — 83735 ASSAY OF MAGNESIUM: CPT

## 2021-11-11 PROCEDURE — 36415 COLL VENOUS BLD VENIPUNCTURE: CPT

## 2021-11-11 PROCEDURE — 83970 ASSAY OF PARATHORMONE: CPT

## 2021-11-11 PROCEDURE — 82962 GLUCOSE BLOOD TEST: CPT

## 2021-11-11 PROCEDURE — 82310 ASSAY OF CALCIUM: CPT

## 2021-11-11 RX ORDER — FOLIC ACID 0.8 MG
1 TABLET ORAL
Qty: 0 | Refills: 0 | DISCHARGE

## 2021-11-11 RX ORDER — SODIUM ZIRCONIUM CYCLOSILICATE 10 G/10G
10 POWDER, FOR SUSPENSION ORAL
Qty: 0 | Refills: 0 | DISCHARGE

## 2021-11-11 RX ORDER — FOLIC ACID 0.8 MG
1 TABLET ORAL
Qty: 0 | Refills: 0 | DISCHARGE
Start: 2021-11-11

## 2021-11-11 RX ORDER — ZOLPIDEM TARTRATE 10 MG/1
1 TABLET ORAL
Qty: 0 | Refills: 0 | DISCHARGE
Start: 2021-11-11

## 2021-11-11 RX ORDER — SIMVASTATIN 20 MG/1
1 TABLET, FILM COATED ORAL
Qty: 0 | Refills: 0 | DISCHARGE
Start: 2021-11-11

## 2021-11-11 RX ORDER — NORTRIPTYLINE HYDROCHLORIDE 10 MG/1
1 CAPSULE ORAL
Qty: 0 | Refills: 0 | DISCHARGE
Start: 2021-11-11

## 2021-11-11 RX ORDER — SEVELAMER CARBONATE 2400 MG/1
3 POWDER, FOR SUSPENSION ORAL
Qty: 0 | Refills: 0 | DISCHARGE
Start: 2021-11-11

## 2021-11-11 RX ORDER — METOCLOPRAMIDE HCL 10 MG
1 TABLET ORAL
Qty: 0 | Refills: 0 | DISCHARGE
Start: 2021-11-11

## 2021-11-11 RX ORDER — CHOLECALCIFEROL (VITAMIN D3) 125 MCG
1 CAPSULE ORAL
Qty: 0 | Refills: 0 | DISCHARGE

## 2021-11-11 RX ORDER — HYDRALAZINE HCL 50 MG
1 TABLET ORAL
Qty: 0 | Refills: 0 | DISCHARGE

## 2021-11-11 RX ORDER — LABETALOL HCL 100 MG
1 TABLET ORAL
Qty: 0 | Refills: 0 | DISCHARGE

## 2021-11-11 RX ORDER — ACETAMINOPHEN 500 MG
2 TABLET ORAL
Qty: 240 | Refills: 0
Start: 2021-11-11 | End: 2021-12-10

## 2021-11-11 RX ORDER — ASPIRIN/CALCIUM CARB/MAGNESIUM 324 MG
1 TABLET ORAL
Qty: 0 | Refills: 0 | DISCHARGE

## 2021-11-11 RX ORDER — SUCRALFATE 1 G
10 TABLET ORAL
Qty: 1200 | Refills: 0
Start: 2021-11-11 | End: 2021-12-10

## 2021-11-11 RX ORDER — ASPIRIN/CALCIUM CARB/MAGNESIUM 324 MG
1 TABLET ORAL
Qty: 0 | Refills: 0 | DISCHARGE
Start: 2021-11-11

## 2021-11-11 RX ORDER — ALPRAZOLAM 0.25 MG
1 TABLET ORAL
Qty: 0 | Refills: 0 | DISCHARGE
Start: 2021-11-11

## 2021-11-11 RX ORDER — ZOLPIDEM TARTRATE 10 MG/1
1 TABLET ORAL
Qty: 0 | Refills: 0 | DISCHARGE

## 2021-11-11 RX ORDER — PANTOPRAZOLE SODIUM 20 MG/1
1 TABLET, DELAYED RELEASE ORAL
Qty: 60 | Refills: 0
Start: 2021-11-11 | End: 2021-12-10

## 2021-11-11 RX ORDER — CHOLECALCIFEROL (VITAMIN D3) 125 MCG
1000 CAPSULE ORAL
Qty: 0 | Refills: 0 | DISCHARGE
Start: 2021-11-11

## 2021-11-11 RX ORDER — ALPRAZOLAM 0.25 MG
1 TABLET ORAL
Qty: 0 | Refills: 0 | DISCHARGE

## 2021-11-11 RX ORDER — IPRATROPIUM/ALBUTEROL SULFATE 18-103MCG
3 AEROSOL WITH ADAPTER (GRAM) INHALATION
Qty: 0 | Refills: 0 | DISCHARGE

## 2021-11-11 RX ORDER — FERROUS SULFATE 325(65) MG
1 TABLET ORAL
Qty: 0 | Refills: 0 | DISCHARGE

## 2021-11-11 RX ORDER — FERROUS SULFATE 325(65) MG
1 TABLET ORAL
Qty: 15 | Refills: 0
Start: 2021-11-11 | End: 2021-12-10

## 2021-11-11 RX ORDER — AMLODIPINE BESYLATE 2.5 MG/1
1 TABLET ORAL
Qty: 0 | Refills: 0 | DISCHARGE
Start: 2021-11-11

## 2021-11-11 RX ORDER — METOCLOPRAMIDE HCL 10 MG
1 TABLET ORAL
Qty: 0 | Refills: 0 | DISCHARGE

## 2021-11-11 RX ORDER — SODIUM ZIRCONIUM CYCLOSILICATE 10 G/10G
10 POWDER, FOR SUSPENSION ORAL
Qty: 300 | Refills: 0
Start: 2021-11-11 | End: 2021-12-10

## 2021-11-11 RX ORDER — SIMVASTATIN 20 MG/1
1 TABLET, FILM COATED ORAL
Qty: 0 | Refills: 0 | DISCHARGE

## 2021-11-11 RX ORDER — AMLODIPINE BESYLATE 2.5 MG/1
1 TABLET ORAL
Qty: 0 | Refills: 0 | DISCHARGE

## 2021-11-11 RX ORDER — NORTRIPTYLINE HYDROCHLORIDE 10 MG/1
1 CAPSULE ORAL
Qty: 0 | Refills: 0 | DISCHARGE

## 2021-11-11 RX ORDER — INSULIN HUMAN 100 [IU]/ML
1 INJECTION, SOLUTION SUBCUTANEOUS
Qty: 1 | Refills: 0
Start: 2021-11-11 | End: 2021-12-10

## 2021-11-11 RX ORDER — HYDRALAZINE HCL 50 MG
1 TABLET ORAL
Qty: 0 | Refills: 0 | DISCHARGE
Start: 2021-11-11

## 2021-11-11 RX ORDER — LABETALOL HCL 100 MG
1 TABLET ORAL
Qty: 0 | Refills: 0 | DISCHARGE
Start: 2021-11-11

## 2021-11-11 RX ORDER — SEVELAMER CARBONATE 2400 MG/1
1 POWDER, FOR SUSPENSION ORAL
Qty: 0 | Refills: 0 | DISCHARGE

## 2021-11-11 RX ORDER — INSULIN HUMAN 100 [IU]/ML
0 INJECTION, SOLUTION SUBCUTANEOUS
Qty: 0 | Refills: 0 | DISCHARGE

## 2021-11-11 RX ADMIN — CHLORHEXIDINE GLUCONATE 1 APPLICATION(S): 213 SOLUTION TOPICAL at 06:06

## 2021-11-11 RX ADMIN — ERYTHROPOIETIN 4000 UNIT(S): 10000 INJECTION, SOLUTION INTRAVENOUS; SUBCUTANEOUS at 12:39

## 2021-11-11 RX ADMIN — Medication 100 MILLIGRAM(S): at 06:07

## 2021-11-11 RX ADMIN — Medication 2: at 08:26

## 2021-11-11 RX ADMIN — Medication 5 MILLIGRAM(S): at 06:07

## 2021-11-11 RX ADMIN — PANTOPRAZOLE SODIUM 40 MILLIGRAM(S): 20 TABLET, DELAYED RELEASE ORAL at 06:07

## 2021-11-11 RX ADMIN — AMLODIPINE BESYLATE 10 MILLIGRAM(S): 2.5 TABLET ORAL at 06:05

## 2021-11-11 RX ADMIN — Medication 50 MILLIGRAM(S): at 06:06

## 2021-11-11 RX ADMIN — Medication 1 GRAM(S): at 06:07

## 2021-11-11 NOTE — PROGRESS NOTE ADULT - ASSESSMENT
# ESRD: SEEN ON  HEMODIALYSIS.   UF AS TOLERATED BY BP  DIETARY SALT AND FLUID RESTRICTION  EPOGEN ON HD   BP CONTROLLED CONT NORVASC HYDRALAZINE AND LABETALOL

## 2021-11-11 NOTE — DISCHARGE NOTE NURSING/CASE MANAGEMENT/SOCIAL WORK - NSDCFUADDAPPT_GEN_ALL_CORE_FT
Outpatient hemodialysis at [facility] ([facility]) reinstated for Saturday 11/13/2021

## 2021-11-11 NOTE — PROGRESS NOTE ADULT - SUBJECTIVE AND OBJECTIVE BOX
[facility] : Progress Note :: [phone], (office [phone]),   Dr Mosher / Dr Washington / Dr Young / Dr Doll / Dr Varsha TURCIOS / Dr Tello / Dr Garcia / Dr Larry qiu  _____________________________________________________________________________________________    seen on HD     fish (Rash)  liver (Anaphylaxis)  No Known Drug Allergies    Hospital Medications:   MEDICATIONS  (STANDING):  ALPRAZolam 0.5 milliGRAM(s) Oral at bedtime  amLODIPine   Tablet 10 milliGRAM(s) Oral daily  aspirin enteric coated 81 milliGRAM(s) Oral daily  chlorhexidine 2% Cloths 1 Application(s) Topical <User Schedule>  cholecalciferol 1000 Unit(s) Oral daily  epoetin beverley-epbx (RETACRIT) Injectable 4000 Unit(s) IV Push <User Schedule>  folic acid 1 milliGRAM(s) Oral daily  hydrALAZINE 50 milliGRAM(s) Oral three times a day  insulin lispro (ADMELOG) corrective regimen sliding scale   SubCutaneous three times a day before meals  insulin lispro (ADMELOG) corrective regimen sliding scale   SubCutaneous at bedtime  labetalol 100 milliGRAM(s) Oral two times a day  metoclopramide 5 milliGRAM(s) Oral three times a day  nortriptyline 10 milliGRAM(s) Oral at bedtime  pantoprazole    Tablet 40 milliGRAM(s) Oral every 12 hours  sevelamer carbonate 2400 milliGRAM(s) Oral three times a day with meals  simvastatin 40 milliGRAM(s) Oral at bedtime  sucralfate suspension 1 Gram(s) Oral four times a day        VITALS:  T(F): 97.9 (11-11-21 @ 06:02), Max: 99.2 (11-10-21 @ 13:42)  HR: 82 (11-11-21 @ 06:02)  BP: 135/82 (11-11-21 @ 06:02)  RR: 18 (11-11-21 @ 06:02)  SpO2: 92% (11-11-21 @ 06:02)  Wt(kg): --      PHYSICAL EXAM:  Constitutional: NAD  HEENT: anicteric sclera, oropharynx clear.  Neck: No JVD  Respiratory: CTAB, no wheezes, rales or rhonchi  Cardiovascular: S1, S2, RRR  Gastrointestinal: BS+, soft, NT/ND  Extremities: No peripheral edema  Neurological: A/O x 3, no focal deficits  Vascular Access: AVF cannulated    LABS:  11-11    137  |  99  |  36<H>  ----------------------------<  162<H>  3.9   |  28  |  11.70<H>    Ca    7.6<L>      11 Nov 2021 10:41  Phos  4.0     11-11  Mg     2.7     11-11    TPro  6.2  /  Alb  2.7<L>  /  TBili  0.6  /  DBili      /  AST  7<L>  /  ALT  13  /  AlkPhos  65  11-09    Creatinine Trend: 11.70 <--, 13.80 <--, 8.89 <--                        10.0   3.86  )-----------( 131      ( 11 Nov 2021 10:41 )             31.6     Urine Studies:      RADIOLOGY & ADDITIONAL STUDIES:

## 2021-11-11 NOTE — DISCHARGE NOTE NURSING/CASE MANAGEMENT/SOCIAL WORK - PATIENT PORTAL LINK FT
You can access the FollowMyHealth Patient Portal offered by [facility] by registering at the following website: [email or web address]. By joining [employer]’s FollowMyHealth portal, you will also be able to view your health information using other applications (apps) compatible with our system.

## 2021-11-12 LAB
GLUCOSE BLDC GLUCOMTR-MCNC: 101 MG/DL — HIGH (ref 70–99)
GLUCOSE BLDC GLUCOMTR-MCNC: 61 MG/DL — LOW (ref 70–99)
GLUCOSE BLDC GLUCOMTR-MCNC: 94 MG/DL — SIGNIFICANT CHANGE UP (ref 70–99)

## 2021-12-23 ENCOUNTER — INPATIENT (INPATIENT)
Facility: HOSPITAL | Age: 61
LOS: 5 days | Discharge: NURSING, REHAB OR LONG-TERM CARE | DRG: 177 | End: 2021-12-29
Attending: INTERNAL MEDICINE | Admitting: INTERNAL MEDICINE
Payer: MEDICAID

## 2021-12-23 VITALS
WEIGHT: 117.95 LBS | OXYGEN SATURATION: 100 % | SYSTOLIC BLOOD PRESSURE: 188 MMHG | HEART RATE: 104 BPM | RESPIRATION RATE: 18 BRPM | HEIGHT: 66 IN | DIASTOLIC BLOOD PRESSURE: 101 MMHG | TEMPERATURE: 99 F

## 2021-12-23 DIAGNOSIS — Z98.42 CATARACT EXTRACTION STATUS, LEFT EYE: Chronic | ICD-10-CM

## 2021-12-23 DIAGNOSIS — R11.10 VOMITING, UNSPECIFIED: ICD-10-CM

## 2021-12-23 DIAGNOSIS — Z98.41 CATARACT EXTRACTION STATUS, RIGHT EYE: Chronic | ICD-10-CM

## 2021-12-23 DIAGNOSIS — K21.9 GASTRO-ESOPHAGEAL REFLUX DISEASE WITHOUT ESOPHAGITIS: ICD-10-CM

## 2021-12-23 DIAGNOSIS — I16.1 HYPERTENSIVE EMERGENCY: ICD-10-CM

## 2021-12-23 DIAGNOSIS — D61.818 OTHER PANCYTOPENIA: ICD-10-CM

## 2021-12-23 DIAGNOSIS — U07.1 COVID-19: ICD-10-CM

## 2021-12-23 DIAGNOSIS — E11.9 TYPE 2 DIABETES MELLITUS WITHOUT COMPLICATIONS: ICD-10-CM

## 2021-12-23 DIAGNOSIS — Z98.890 OTHER SPECIFIED POSTPROCEDURAL STATES: Chronic | ICD-10-CM

## 2021-12-23 DIAGNOSIS — Z87.448 PERSONAL HISTORY OF OTHER DISEASES OF URINARY SYSTEM: Chronic | ICD-10-CM

## 2021-12-23 DIAGNOSIS — N18.6 END STAGE RENAL DISEASE: ICD-10-CM

## 2021-12-23 DIAGNOSIS — Z89.512 ACQUIRED ABSENCE OF LEFT LEG BELOW KNEE: Chronic | ICD-10-CM

## 2021-12-23 DIAGNOSIS — Z29.9 ENCOUNTER FOR PROPHYLACTIC MEASURES, UNSPECIFIED: ICD-10-CM

## 2021-12-23 LAB
ALBUMIN SERPL ELPH-MCNC: 3.1 G/DL — LOW (ref 3.5–5)
ALP SERPL-CCNC: 60 U/L — SIGNIFICANT CHANGE UP (ref 40–120)
ALT FLD-CCNC: 16 U/L DA — SIGNIFICANT CHANGE UP (ref 10–60)
ANION GAP SERPL CALC-SCNC: 11 MMOL/L — SIGNIFICANT CHANGE UP (ref 5–17)
ANISOCYTOSIS BLD QL: SLIGHT — SIGNIFICANT CHANGE UP
APTT BLD: 27.1 SEC — LOW (ref 27.5–35.5)
AST SERPL-CCNC: 26 U/L — SIGNIFICANT CHANGE UP (ref 10–40)
BASE EXCESS BLDA CALC-SCNC: 0.5 MMOL/L — SIGNIFICANT CHANGE UP (ref -2–3)
BASOPHILS # BLD AUTO: 0 K/UL — SIGNIFICANT CHANGE UP (ref 0–0.2)
BASOPHILS NFR BLD AUTO: 0 % — SIGNIFICANT CHANGE UP (ref 0–2)
BILIRUB SERPL-MCNC: 0.9 MG/DL — SIGNIFICANT CHANGE UP (ref 0.2–1.2)
BLOOD GAS COMMENTS ARTERIAL: SIGNIFICANT CHANGE UP
BUN SERPL-MCNC: 46 MG/DL — HIGH (ref 7–18)
CALCIUM SERPL-MCNC: 8.2 MG/DL — LOW (ref 8.4–10.5)
CHLORIDE SERPL-SCNC: 100 MMOL/L — SIGNIFICANT CHANGE UP (ref 96–108)
CO2 SERPL-SCNC: 29 MMOL/L — SIGNIFICANT CHANGE UP (ref 22–31)
CREAT SERPL-MCNC: 11.9 MG/DL — HIGH (ref 0.5–1.3)
EOSINOPHIL # BLD AUTO: 0.05 K/UL — SIGNIFICANT CHANGE UP (ref 0–0.5)
EOSINOPHIL NFR BLD AUTO: 3 % — SIGNIFICANT CHANGE UP (ref 0–6)
GLUCOSE SERPL-MCNC: 107 MG/DL — HIGH (ref 70–99)
HCO3 BLDA-SCNC: 27 MMOL/L — SIGNIFICANT CHANGE UP (ref 21–28)
HCT VFR BLD CALC: 33.1 % — LOW (ref 39–50)
HGB BLD-MCNC: 10.8 G/DL — LOW (ref 13–17)
HOROWITZ INDEX BLDA+IHG-RTO: 100 — SIGNIFICANT CHANGE UP
HYPOCHROMIA BLD QL: SLIGHT — SIGNIFICANT CHANGE UP
INR BLD: 0.91 RATIO — SIGNIFICANT CHANGE UP (ref 0.88–1.16)
LACTATE SERPL-SCNC: <0.1 MMOL/L — LOW (ref 0.7–2)
LIDOCAIN IGE QN: 71 U/L — LOW (ref 73–393)
LYMPHOCYTES # BLD AUTO: 0.21 K/UL — LOW (ref 1–3.3)
LYMPHOCYTES # BLD AUTO: 12 % — LOW (ref 13–44)
MAGNESIUM SERPL-MCNC: 2.9 MG/DL — HIGH (ref 1.6–2.6)
MANUAL SMEAR VERIFICATION: SIGNIFICANT CHANGE UP
MCHC RBC-ENTMCNC: 28.9 PG — SIGNIFICANT CHANGE UP (ref 27–34)
MCHC RBC-ENTMCNC: 32.6 GM/DL — SIGNIFICANT CHANGE UP (ref 32–36)
MCV RBC AUTO: 88.5 FL — SIGNIFICANT CHANGE UP (ref 80–100)
MICROCYTES BLD QL: SLIGHT — SIGNIFICANT CHANGE UP
MONOCYTES # BLD AUTO: 0.09 K/UL — SIGNIFICANT CHANGE UP (ref 0–0.9)
MONOCYTES NFR BLD AUTO: 5 % — SIGNIFICANT CHANGE UP (ref 2–14)
NEUTROPHILS # BLD AUTO: 1.4 K/UL — LOW (ref 1.8–7.4)
NEUTROPHILS NFR BLD AUTO: 80 % — HIGH (ref 43–77)
NRBC # BLD: 0 /100 — SIGNIFICANT CHANGE UP (ref 0–0)
OVALOCYTES BLD QL SMEAR: SLIGHT — SIGNIFICANT CHANGE UP
PCO2 BLDA: 50 MMHG — HIGH (ref 35–48)
PH BLDA: 7.34 — LOW (ref 7.35–7.45)
PHOSPHATE SERPL-MCNC: 5 MG/DL — HIGH (ref 2.5–4.5)
PLAT MORPH BLD: NORMAL — SIGNIFICANT CHANGE UP
PLATELET # BLD AUTO: 62 K/UL — LOW (ref 150–400)
PO2 BLDA: 406 MMHG — HIGH (ref 83–108)
POIKILOCYTOSIS BLD QL AUTO: SLIGHT — SIGNIFICANT CHANGE UP
POTASSIUM SERPL-MCNC: 4.4 MMOL/L — SIGNIFICANT CHANGE UP (ref 3.5–5.3)
POTASSIUM SERPL-SCNC: 4.4 MMOL/L — SIGNIFICANT CHANGE UP (ref 3.5–5.3)
PROT SERPL-MCNC: 6.7 G/DL — SIGNIFICANT CHANGE UP (ref 6–8.3)
PROTHROM AB SERPL-ACNC: 10.9 SEC — SIGNIFICANT CHANGE UP (ref 10.6–13.6)
RAPID RVP RESULT: DETECTED
RBC # BLD: 3.74 M/UL — LOW (ref 4.2–5.8)
RBC # FLD: 20.8 % — HIGH (ref 10.3–14.5)
RBC BLD AUTO: ABNORMAL
SAO2 % BLDA: 97 % — SIGNIFICANT CHANGE UP
SARS-COV-2 RNA SPEC QL NAA+PROBE: DETECTED
SCHISTOCYTES BLD QL AUTO: SLIGHT — SIGNIFICANT CHANGE UP
SODIUM SERPL-SCNC: 140 MMOL/L — SIGNIFICANT CHANGE UP (ref 135–145)
SPHEROCYTES BLD QL SMEAR: SLIGHT — SIGNIFICANT CHANGE UP
TROPONIN I, HIGH SENSITIVITY RESULT: 188.4 NG/L — HIGH
WBC # BLD: 1.75 K/UL — LOW (ref 3.8–10.5)
WBC # FLD AUTO: 1.75 K/UL — LOW (ref 3.8–10.5)

## 2021-12-23 PROCEDURE — 99285 EMERGENCY DEPT VISIT HI MDM: CPT

## 2021-12-23 PROCEDURE — 93010 ELECTROCARDIOGRAM REPORT: CPT

## 2021-12-23 PROCEDURE — 71045 X-RAY EXAM CHEST 1 VIEW: CPT | Mod: 26

## 2021-12-23 RX ORDER — SIMVASTATIN 20 MG/1
40 TABLET, FILM COATED ORAL AT BEDTIME
Refills: 0 | Status: DISCONTINUED | OUTPATIENT
Start: 2021-12-23 | End: 2021-12-29

## 2021-12-23 RX ORDER — NORTRIPTYLINE HYDROCHLORIDE 10 MG/1
10 CAPSULE ORAL AT BEDTIME
Refills: 0 | Status: DISCONTINUED | OUTPATIENT
Start: 2021-12-23 | End: 2021-12-29

## 2021-12-23 RX ORDER — HEPARIN SODIUM 5000 [USP'U]/ML
5000 INJECTION INTRAVENOUS; SUBCUTANEOUS EVERY 12 HOURS
Refills: 0 | Status: DISCONTINUED | OUTPATIENT
Start: 2021-12-23 | End: 2021-12-29

## 2021-12-23 RX ORDER — INSULIN LISPRO 100/ML
VIAL (ML) SUBCUTANEOUS AT BEDTIME
Refills: 0 | Status: DISCONTINUED | OUTPATIENT
Start: 2021-12-23 | End: 2021-12-29

## 2021-12-23 RX ORDER — AMLODIPINE BESYLATE 2.5 MG/1
10 TABLET ORAL DAILY
Refills: 0 | Status: DISCONTINUED | OUTPATIENT
Start: 2021-12-23 | End: 2021-12-29

## 2021-12-23 RX ORDER — SUCRALFATE 1 G
1 TABLET ORAL
Refills: 0 | Status: DISCONTINUED | OUTPATIENT
Start: 2021-12-23 | End: 2021-12-29

## 2021-12-23 RX ORDER — AMLODIPINE BESYLATE 2.5 MG/1
10 TABLET ORAL ONCE
Refills: 0 | Status: COMPLETED | OUTPATIENT
Start: 2021-12-23 | End: 2021-12-23

## 2021-12-23 RX ORDER — INSULIN LISPRO 100/ML
VIAL (ML) SUBCUTANEOUS
Refills: 0 | Status: DISCONTINUED | OUTPATIENT
Start: 2021-12-23 | End: 2021-12-29

## 2021-12-23 RX ORDER — SODIUM CHLORIDE 9 MG/ML
1000 INJECTION INTRAMUSCULAR; INTRAVENOUS; SUBCUTANEOUS ONCE
Refills: 0 | Status: COMPLETED | OUTPATIENT
Start: 2021-12-23 | End: 2021-12-23

## 2021-12-23 RX ORDER — DEXAMETHASONE 0.5 MG/5ML
6 ELIXIR ORAL DAILY
Refills: 0 | Status: DISCONTINUED | OUTPATIENT
Start: 2021-12-24 | End: 2021-12-28

## 2021-12-23 RX ORDER — FERROUS SULFATE 325(65) MG
325 TABLET ORAL DAILY
Refills: 0 | Status: DISCONTINUED | OUTPATIENT
Start: 2021-12-23 | End: 2021-12-29

## 2021-12-23 RX ORDER — HYDRALAZINE HCL 50 MG
10 TABLET ORAL THREE TIMES A DAY
Refills: 0 | Status: DISCONTINUED | OUTPATIENT
Start: 2021-12-23 | End: 2021-12-29

## 2021-12-23 RX ORDER — NITROGLYCERIN 6.5 MG
0.4 CAPSULE, EXTENDED RELEASE ORAL ONCE
Refills: 0 | Status: COMPLETED | OUTPATIENT
Start: 2021-12-23 | End: 2021-12-23

## 2021-12-23 RX ORDER — METOCLOPRAMIDE HCL 10 MG
5 TABLET ORAL THREE TIMES A DAY
Refills: 0 | Status: DISCONTINUED | OUTPATIENT
Start: 2021-12-23 | End: 2021-12-29

## 2021-12-23 RX ORDER — ASPIRIN/CALCIUM CARB/MAGNESIUM 324 MG
81 TABLET ORAL DAILY
Refills: 0 | Status: DISCONTINUED | OUTPATIENT
Start: 2021-12-23 | End: 2021-12-29

## 2021-12-23 RX ORDER — SEVELAMER CARBONATE 2400 MG/1
2400 POWDER, FOR SUSPENSION ORAL
Refills: 0 | Status: DISCONTINUED | OUTPATIENT
Start: 2021-12-23 | End: 2021-12-29

## 2021-12-23 RX ORDER — FOLIC ACID 0.8 MG
1 TABLET ORAL DAILY
Refills: 0 | Status: DISCONTINUED | OUTPATIENT
Start: 2021-12-23 | End: 2021-12-29

## 2021-12-23 RX ORDER — ONDANSETRON 8 MG/1
4 TABLET, FILM COATED ORAL ONCE
Refills: 0 | Status: COMPLETED | OUTPATIENT
Start: 2021-12-23 | End: 2021-12-23

## 2021-12-23 RX ORDER — CHOLECALCIFEROL (VITAMIN D3) 125 MCG
1000 CAPSULE ORAL DAILY
Refills: 0 | Status: DISCONTINUED | OUTPATIENT
Start: 2021-12-23 | End: 2021-12-29

## 2021-12-23 RX ORDER — ALBUTEROL 90 UG/1
2 AEROSOL, METERED ORAL EVERY 4 HOURS
Refills: 0 | Status: DISCONTINUED | OUTPATIENT
Start: 2021-12-23 | End: 2021-12-29

## 2021-12-23 RX ORDER — FAMOTIDINE 10 MG/ML
20 INJECTION INTRAVENOUS ONCE
Refills: 0 | Status: COMPLETED | OUTPATIENT
Start: 2021-12-23 | End: 2021-12-23

## 2021-12-23 RX ORDER — HYDRALAZINE HCL 50 MG
50 TABLET ORAL ONCE
Refills: 0 | Status: COMPLETED | OUTPATIENT
Start: 2021-12-23 | End: 2021-12-23

## 2021-12-23 RX ORDER — ZOLPIDEM TARTRATE 10 MG/1
5 TABLET ORAL AT BEDTIME
Refills: 0 | Status: DISCONTINUED | OUTPATIENT
Start: 2021-12-23 | End: 2021-12-29

## 2021-12-23 RX ORDER — PANTOPRAZOLE SODIUM 20 MG/1
40 TABLET, DELAYED RELEASE ORAL
Refills: 0 | Status: DISCONTINUED | OUTPATIENT
Start: 2021-12-23 | End: 2021-12-29

## 2021-12-23 RX ADMIN — SODIUM CHLORIDE 1000 MILLILITER(S): 9 INJECTION INTRAMUSCULAR; INTRAVENOUS; SUBCUTANEOUS at 13:36

## 2021-12-23 RX ADMIN — NORTRIPTYLINE HYDROCHLORIDE 10 MILLIGRAM(S): 10 CAPSULE ORAL at 23:34

## 2021-12-23 RX ADMIN — FAMOTIDINE 20 MILLIGRAM(S): 10 INJECTION INTRAVENOUS at 13:36

## 2021-12-23 RX ADMIN — SIMVASTATIN 40 MILLIGRAM(S): 20 TABLET, FILM COATED ORAL at 23:33

## 2021-12-23 RX ADMIN — AMLODIPINE BESYLATE 10 MILLIGRAM(S): 2.5 TABLET ORAL at 18:56

## 2021-12-23 RX ADMIN — ONDANSETRON 4 MILLIGRAM(S): 8 TABLET, FILM COATED ORAL at 13:36

## 2021-12-23 RX ADMIN — Medication 5 MILLIGRAM(S): at 23:32

## 2021-12-23 RX ADMIN — Medication 50 MILLIGRAM(S): at 18:56

## 2021-12-23 RX ADMIN — Medication 0.4 MILLIGRAM(S): at 16:12

## 2021-12-23 NOTE — H&P ADULT - ASSESSMENT
61M w/ PMHx of DM, HTN, GERD, CAD, ESRD on hemodialysis, DM retinopath, Left leg amputation from [facility] assisted living,  presents to the ED with vomiting. Pt with episode of HTN emergency in ED and found to be Covid +.

## 2021-12-23 NOTE — ED PROVIDER NOTE - CLINICAL SUMMARY MEDICAL DECISION MAKING FREE TEXT BOX
61M presenting with non-bloody vomiting. non-tender abdomen. febrile at nursing home. concern for viral illness. will get labs, CT abdomen. will reassess.

## 2021-12-23 NOTE — ED ADULT NURSE REASSESSMENT NOTE - NS ED NURSE REASSESS COMMENT FT1
Patient seen and evaluated by DR Rousseau attached to NRM, at aprox 4PM patient noted dyspneic, anxious and agitated pulled IV heplock, V/S completed high blood pressure noted Patient attached to BiPAP, ECG completed.

## 2021-12-23 NOTE — H&P ADULT - NSHPPHYSICALEXAM_GEN_ALL_CORE
GENERAL: NAD, well-groomed, well-developed  HEAD:  Atraumatic, Normocephalic  EYES: EOMI, conjunctiva and sclera clear  ENMT: dry mucous membranes, no lesions  NECK: Supple, normal appearance, No JVD; Normal thyroid; Trachea midline  NERVOUS SYSTEM:  Alert & Oriented X3,  Motor Strength 5/5 B/L upper and lower extremities, sensation intact  CHEST/LUNG: Lungs clear to auscultation bilaterally, No rales, rhonchi, wheezing   HEART: tachycardic, No murmurs, rubs, or gallops, loud S1S2  ABDOMEN: Soft, Nontender, Nondistended; Bowel sounds present  EXTREMITIES: + left leg amputation. 2+ Peripheral Pulses, No clubbing, cyanosis, or edema  LYMPH: No lymphadenopathy noted  SKIN: No rashes or lesions;  Good capillary refill

## 2021-12-23 NOTE — ED PROVIDER NOTE - NSFOLLOWUPINSTRUCTIONS_ED_ALL_ED_FT
You were seen in the emergency department for vomiting.     Please follow-up with your primary care doctor in the next 24-48 hours.     If you have any worsening symptoms, severe headache, chest pain, shortness of breath, or you are unable to tolerate food or fluids, please return to the emergency department.

## 2021-12-23 NOTE — H&P ADULT - PROBLEM SELECTOR PLAN 2
Pt with vomiting for some time  last admission with hematemesis, denied EGD at that time, CT abdomen wnl  Continue reglan, ppi, zofran, carafate  Liquid diet  gentle hydration (ESRD)  F/u CT abdomen

## 2021-12-23 NOTE — H&P ADULT - NSHPREVIEWOFSYSTEMS_GEN_ALL_CORE
CONSTITUTIONAL: No fever, weight loss, or fatigue  EYES: No eye pain, visual disturbances, or discharge  ENT:  No difficulty hearing, tinnitus, vertigo; No sinus or throat pain  NECK: No pain or stiffness  RESPIRATORY: No cough, wheezing, chills or hemoptysis; + Shortness of Breath  CARDIOVASCULAR: No chest pain, palpitations, passing out, dizziness, or leg swelling  GASTROINTESTINAL: + vomiting, No abdominal or epigastric pain. No nausea, hematemesis; No diarrhea or constipation. No melena or hematochezia.  GENITOURINARY: No dysuria, frequency, hematuria, or incontinence  NEUROLOGICAL: No headaches, memory loss, loss of strength, numbness, or tremors  SKIN: No itching, burning, rashes, or lesions   LYMPH Nodes: No enlarged glands  ENDOCRINE: No heat or cold intolerance; No hair loss  MUSCULOSKELETAL: No joint pain or swelling; No muscle, back, No extremity pain  PSYCHIATRIC: No depression, anxiety, mood swings, or difficulty sleeping  HEME/LYMPH: No easy bruising, or bleeding gums  ALLERGY AND IMMUNOLOGIC: No hives or eczema

## 2021-12-23 NOTE — ED ADULT NURSE REASSESSMENT NOTE - NS ED NURSE REASSESS COMMENT FT1
Patient resting on stretcher, Bi-PAP D/C as per MD order attached to N/C 3lit/min SaO2 97-99%. endorsed to Sushila BORDEN, safety maintained.

## 2021-12-23 NOTE — ED PROVIDER NOTE - PROGRESS NOTE DETAILS
patient had episode of sudden shortness of breath. was diaphoretic. BP elevated to 230/110. placed on bipap and given nitro tablet with improvement in symptoms. BP stabilized. taken off bipap and again sating 99% on room air. given home bP meds. will admit for covid-19 and hypertensive emergency. Des Rousseau

## 2021-12-23 NOTE — ED ADULT NURSE NOTE - NSIMPLEMENTINTERV_GEN_ALL_ED
Implemented All Fall Risk Interventions:  [place] to call system. Call bell, personal items and telephone within reach. Instruct patient to call for assistance. Room bathroom lighting operational. Non-slip footwear when patient is off stretcher. Physically safe environment: no spills, clutter or unnecessary equipment. Stretcher in lowest position, wheels locked, appropriate side rails in place. Provide visual cue, wrist band, yellow gown, etc. Monitor gait and stability. Monitor for mental status changes and reorient to person, place, and time. Review medications for side effects contributing to fall risk. Reinforce activity limits and safety measures with patient and family.

## 2021-12-23 NOTE — H&P ADULT - PROBLEM SELECTOR PLAN 3
Pt tested + for covid in ed  xray clear, f/u official read  s/p vaccine x 1 in Jan 2021 as per NH chart  on NC 3L  will start decadron, no remdesivir due to ESRD?   albuterol, tylenol, tessalon pearle prn  monitor O2 status  isolation precautions  ID f/u: Dr. Newby

## 2021-12-23 NOTE — ED PROVIDER NOTE - OBJECTIVE STATEMENT
dm, cad, htn, esrd (tthsat. two days of vomting. simalr symptoms in the pst. no fever, chest pain, shrontess of breath, abdominal pain or diarrea. completed dialysis yeterday. 61M, pmh of DM, CAD, HTN, ESRD (TThSat), presenting with two days of vomiting. no blood in vomitus. reports this is been occurring on and off for the past year. no chest pain, shortness of breath, diarrhea.

## 2021-12-23 NOTE — H&P ADULT - PROBLEM SELECTOR PLAN 1
Pt with episode of HTN emergency in the ED, developed SOB  s/p Nitroglycerin, amlodipine and hydralazine with improvement in bp  temporary period of bipap, now in room air saturating well  Xray clear, no pulmonary edema, f/u official read  Troponin 188, likely in setting of demand ischemia  EKG: Sinus tachy, non specific T wave changes  f/u T2  tele monitoring  Cardio: Dr. Still

## 2021-12-23 NOTE — H&P ADULT - PROBLEM SELECTOR PLAN 4
Pt with pancytopenia  anc: 1400  hgb 10 (hx of hematamesis, denies currently, denies melena)  ptl : 62  unclear etiology, covid? ESRD?   Monitor cbc  consider heme consult? Pt with pancytopenia  anc: 1400  hgb 10 (hx of hematamesis, denies currently, denies melena)  ptl : 62  unclear etiology, covid? ESRD?   Monitor cbc  f/u hiv, [place], rpr  consider heme consult?

## 2021-12-23 NOTE — H&P ADULT - HISTORY OF PRESENT ILLNESS
61M w/ PMHx of DM, HTN, GERD, CAD, ESRD on hemodialysis (T,Th,S), DM retinopath, Left leg amputation from [facility],  presents to the ED with vomiting. Pt states that he has been vomiting for the last 3 days around 3 times a day, after meals. Denies any hematemesis, abdominal pain, diarrhea or constipation. He also endorses cough and mild SOB. he states that he hasn't eaten his medications because of the vomiting. In the ED patient had episode of hypertensive emergency and SOB, he was placed on bipap temporarily, given bp meds, and fluids with improvement of oxygenation and BP. currently pt comfortable on NC. Patient tested + for covid in ED, as per nursing home records, pt only vaccinated x 1 in Jan 2021.

## 2021-12-23 NOTE — ED ADULT NURSE NOTE - OBJECTIVE STATEMENT
patient presents with c/o abdominal discomfort  vomiting for 2 days, reports ESRD on hemodialysis T, Th, Sat, denies chest pain, shortness of breath, diarrhea.

## 2021-12-23 NOTE — H&P ADULT - PROBLEM SELECTOR PLAN 5
Continue home meds  continue HD  last HD on wednesday, he says because of closure of center on weekend for ximena  Nephro consult: Dr. Mosher

## 2021-12-24 DIAGNOSIS — U07.1 COVID-19: ICD-10-CM

## 2021-12-24 DIAGNOSIS — K20.90 ESOPHAGITIS, UNSPECIFIED WITHOUT BLEEDING: ICD-10-CM

## 2021-12-24 DIAGNOSIS — J96.00 ACUTE RESPIRATORY FAILURE, UNSPECIFIED WHETHER WITH HYPOXIA OR HYPERCAPNIA: ICD-10-CM

## 2021-12-24 DIAGNOSIS — Z02.9 ENCOUNTER FOR ADMINISTRATIVE EXAMINATIONS, UNSPECIFIED: ICD-10-CM

## 2021-12-24 LAB
ALBUMIN SERPL ELPH-MCNC: 2.7 G/DL — LOW (ref 3.5–5)
ALP SERPL-CCNC: 56 U/L — SIGNIFICANT CHANGE UP (ref 40–120)
ALT FLD-CCNC: 19 U/L DA — SIGNIFICANT CHANGE UP (ref 10–60)
ANION GAP SERPL CALC-SCNC: 11 MMOL/L — SIGNIFICANT CHANGE UP (ref 5–17)
AST SERPL-CCNC: 22 U/L — SIGNIFICANT CHANGE UP (ref 10–40)
BILIRUB SERPL-MCNC: 0.7 MG/DL — SIGNIFICANT CHANGE UP (ref 0.2–1.2)
BUN SERPL-MCNC: 62 MG/DL — HIGH (ref 7–18)
CALCIUM SERPL-MCNC: 7.4 MG/DL — LOW (ref 8.4–10.5)
CHLORIDE SERPL-SCNC: 99 MMOL/L — SIGNIFICANT CHANGE UP (ref 96–108)
CO2 SERPL-SCNC: 26 MMOL/L — SIGNIFICANT CHANGE UP (ref 22–31)
CREAT SERPL-MCNC: 14.2 MG/DL — HIGH (ref 0.5–1.3)
GLUCOSE BLDC GLUCOMTR-MCNC: 122 MG/DL — HIGH (ref 70–99)
GLUCOSE BLDC GLUCOMTR-MCNC: 177 MG/DL — HIGH (ref 70–99)
GLUCOSE BLDC GLUCOMTR-MCNC: 182 MG/DL — HIGH (ref 70–99)
GLUCOSE BLDC GLUCOMTR-MCNC: 92 MG/DL — SIGNIFICANT CHANGE UP (ref 70–99)
GLUCOSE SERPL-MCNC: 211 MG/DL — HIGH (ref 70–99)
HCT VFR BLD CALC: 30.4 % — LOW (ref 39–50)
HGB BLD-MCNC: 9.8 G/DL — LOW (ref 13–17)
MCHC RBC-ENTMCNC: 28.2 PG — SIGNIFICANT CHANGE UP (ref 27–34)
MCHC RBC-ENTMCNC: 32.2 GM/DL — SIGNIFICANT CHANGE UP (ref 32–36)
MCV RBC AUTO: 87.6 FL — SIGNIFICANT CHANGE UP (ref 80–100)
NRBC # BLD: 0 /100 WBCS — SIGNIFICANT CHANGE UP (ref 0–0)
PLATELET # BLD AUTO: 62 K/UL — LOW (ref 150–400)
POTASSIUM SERPL-MCNC: 5.1 MMOL/L — SIGNIFICANT CHANGE UP (ref 3.5–5.3)
POTASSIUM SERPL-SCNC: 5.1 MMOL/L — SIGNIFICANT CHANGE UP (ref 3.5–5.3)
PROT SERPL-MCNC: 6.1 G/DL — SIGNIFICANT CHANGE UP (ref 6–8.3)
RBC # BLD: 3.47 M/UL — LOW (ref 4.2–5.8)
RBC # FLD: 19.9 % — HIGH (ref 10.3–14.5)
SODIUM SERPL-SCNC: 136 MMOL/L — SIGNIFICANT CHANGE UP (ref 135–145)
TROPONIN I, HIGH SENSITIVITY RESULT: 242.8 NG/L — HIGH
WBC # BLD: 2.3 K/UL — LOW (ref 3.8–10.5)
WBC # FLD AUTO: 2.3 K/UL — LOW (ref 3.8–10.5)

## 2021-12-24 RX ORDER — ERYTHROPOIETIN 10000 [IU]/ML
4000 INJECTION, SOLUTION INTRAVENOUS; SUBCUTANEOUS
Refills: 0 | Status: DISCONTINUED | OUTPATIENT
Start: 2021-12-24 | End: 2021-12-29

## 2021-12-24 RX ADMIN — Medication 1 GRAM(S): at 06:28

## 2021-12-24 RX ADMIN — SEVELAMER CARBONATE 2400 MILLIGRAM(S): 2400 POWDER, FOR SUSPENSION ORAL at 09:37

## 2021-12-24 RX ADMIN — Medication 0: at 21:40

## 2021-12-24 RX ADMIN — ERYTHROPOIETIN 4000 UNIT(S): 10000 INJECTION, SOLUTION INTRAVENOUS; SUBCUTANEOUS at 18:07

## 2021-12-24 RX ADMIN — Medication 5 MILLIGRAM(S): at 08:56

## 2021-12-24 RX ADMIN — Medication 10 MILLIGRAM(S): at 21:40

## 2021-12-24 RX ADMIN — AMLODIPINE BESYLATE 10 MILLIGRAM(S): 2.5 TABLET ORAL at 06:27

## 2021-12-24 RX ADMIN — SIMVASTATIN 40 MILLIGRAM(S): 20 TABLET, FILM COATED ORAL at 21:40

## 2021-12-24 RX ADMIN — Medication 5 MILLIGRAM(S): at 21:40

## 2021-12-24 RX ADMIN — PANTOPRAZOLE SODIUM 40 MILLIGRAM(S): 20 TABLET, DELAYED RELEASE ORAL at 08:55

## 2021-12-24 RX ADMIN — Medication 6 MILLIGRAM(S): at 06:30

## 2021-12-24 RX ADMIN — Medication 10 MILLIGRAM(S): at 06:28

## 2021-12-24 RX ADMIN — NORTRIPTYLINE HYDROCHLORIDE 10 MILLIGRAM(S): 10 CAPSULE ORAL at 21:40

## 2021-12-24 RX ADMIN — Medication 1 GRAM(S): at 00:57

## 2021-12-24 NOTE — PROGRESS NOTE ADULT - PROBLEM SELECTOR PLAN 4
Pt with pancytopenia  anc: 1400  hgb 10 (hx of hematamesis, denies currently, denies melena)  ptl : 62  unclear etiology, covid? ESRD?   Monitor cbc  f/u hiv, [place], rpr  consider heme consult? unclear etiology COVID and ESRD   Monitor CBC   f/u HIV/ JAMEEL and RPR

## 2021-12-24 NOTE — PROGRESS NOTE ADULT - PROBLEM SELECTOR PLAN 3
Pt tested + for covid in ed  xray clear, f/u official read  s/p vaccine x 1 in Jan 2021 as per NH chart  on NC 3L  will start decadron, no remdesivir due to ESRD?   albuterol, tylenol, tessalon pearle prn  monitor O2 status  isolation precautions  ID f/u: Dr. Newby COVID positive with mild hypoxia  likely hypoxia from hypertensive urgency  continue supplemental O2 for now  will titrate down as tolerated   no cough or SOB noted  continue isolation  f/u inflammatory markers  continue supplement

## 2021-12-24 NOTE — PROGRESS NOTE ADULT - SUBJECTIVE AND OBJECTIVE BOX
Patient is a 61y old  Male who presents with a chief complaint of Vomiting (24 Dec 2021 08:49)    PATIENT IS SEEN AND EXAMINED IN MEDICAL FLOOR.  MARIIT [    ]    JEREMY [   ]      GT [   ]    ALLERGIES:  fish (Rash)  liver (Anaphylaxis)  No Known Drug Allergies      Daily     Daily     VITALS:    Vital Signs Last 24 Hrs  T(C): 36.7 (24 Dec 2021 07:35), Max: 37.7 (23 Dec 2021 19:59)  T(F): 98 (24 Dec 2021 07:35), Max: 99.8 (23 Dec 2021 19:59)  HR: 95 (24 Dec 2021 07:35) (90 - 130)  BP: 165/102 (24 Dec 2021 07:35) (115/65 - 213/101)  BP(mean): --  RR: 18 (24 Dec 2021 07:35) (18 - 26)  SpO2: 97% (24 Dec 2021 07:35) (91% - 100%)    LABS:    CBC Full  -  ( 23 Dec 2021 13:55 )  WBC Count : 1.75 K/uL  RBC Count : 3.74 M/uL  Hemoglobin : 10.8 g/dL  Hematocrit : 33.1 %  Platelet Count - Automated : 62 K/uL  Mean Cell Volume : 88.5 fl  Mean Cell Hemoglobin : 28.9 pg  Mean Cell Hemoglobin Concentration : 32.6 gm/dL  Auto Neutrophil # : 1.40 K/uL  Auto Lymphocyte # : 0.21 K/uL  Auto Monocyte # : 0.09 K/uL  Auto Eosinophil # : 0.05 K/uL  Auto Basophil # : 0.00 K/uL  Auto Neutrophil % : 80.0 %  Auto Lymphocyte % : 12.0 %  Auto Monocyte % : 5.0 %  Auto Eosinophil % : 3.0 %  Auto Basophil % : 0.0 %    PT/INR - ( 23 Dec 2021 16:10 )   PT: 10.9 sec;   INR: 0.91 ratio         PTT - ( 23 Dec 2021 16:10 )  PTT:27.1 sec  12-23    140  |  100  |  46<H>  ----------------------------<  107<H>  4.4   |  29  |  11.90<H>    Ca    8.2<L>      23 Dec 2021 13:55  Phos  5.0     12-23  Mg     2.9     12-23    TPro  6.7  /  Alb  3.1<L>  /  TBili  0.9  /  DBili  x   /  AST  26  /  ALT  16  /  AlkPhos  60  12-23    CAPILLARY BLOOD GLUCOSE      POCT Blood Glucose.: 92 mg/dL (24 Dec 2021 09:00)  POCT Blood Glucose.: 82 mg/dL (23 Dec 2021 15:52)        LIVER FUNCTIONS - ( 23 Dec 2021 13:55 )  Alb: 3.1 g/dL / Pro: 6.7 g/dL / ALK PHOS: 60 U/L / ALT: 16 U/L DA / AST: 26 U/L / GGT: x           Creatinine Trend: 11.90<--  I&O's Summary      ABG - ( 23 Dec 2021 16:16 )  pH, Arterial: 7.34  pH, Blood: x     /  pCO2: 50    /  pO2: 406   / HCO3: 27    / Base Excess: 0.5   /  SaO2: 97                      MEDICATIONS:    MEDICATIONS  (STANDING):  amLODIPine   Tablet 10 milliGRAM(s) Oral daily  aspirin enteric coated 81 milliGRAM(s) Oral daily  cholecalciferol 1000 Unit(s) Oral daily  dexAMETHasone  Injectable 6 milliGRAM(s) IV Push daily  ferrous    sulfate 325 milliGRAM(s) Oral daily  folic acid 1 milliGRAM(s) Oral daily  heparin   Injectable 5000 Unit(s) SubCutaneous every 12 hours  hydrALAZINE 10 milliGRAM(s) Oral three times a day  insulin lispro (ADMELOG) corrective regimen sliding scale   SubCutaneous three times a day before meals  insulin lispro (ADMELOG) corrective regimen sliding scale   SubCutaneous at bedtime  metoclopramide 5 milliGRAM(s) Oral three times a day  nortriptyline 10 milliGRAM(s) Oral at bedtime  pantoprazole    Tablet 40 milliGRAM(s) Oral before breakfast  sevelamer carbonate 2400 milliGRAM(s) Oral three times a day with meals  simvastatin 40 milliGRAM(s) Oral at bedtime  sucralfate suspension 1 Gram(s) Oral four times a day      MEDICATIONS  (PRN):  ALBUTerol    90 MICROgram(s) HFA Inhaler 2 Puff(s) Inhalation every 4 hours PRN Shortness of Breath and/or Wheezing  benzonatate 100 milliGRAM(s) Oral three times a day PRN Cough  zolpidem 5 milliGRAM(s) Oral at bedtime PRN Insomnia      REVIEW OF SYSTEMS:                           ALL ROS DONE [ X   ]    CONSTITUTIONAL:  LETHARGIC [   ], FEVER [   ], UNRESPONSIVE [   ]  CVS:  CP  [   ], SOB, [   ], PALPITATIONS [   ], DIZZYNESS [   ]  RS: COUGH [   ], SPUTUM [   ]  GI: ABDOMINAL PAIN [   ], NAUSEA [   ], VOMITINGS [   ], DIARRHEA [   ], CONSTIPATION [   ]  :  DYSURIA [   ], NOCTURIA [   ], INCREASED FREQUENCY [   ], DRIBLING [   ],  SKELETAL: PAINFUL JOINTS [   ], SWOLLEN JOINTS [   ], NECK ACHE [   ], LOW BACK ACHE [   ],  SKIN : ULCERS [   ], RASH [   ], ITCHING [   ]  CNS: HEAD ACHE [   ], DOUBLE VISION [   ], BLURRED VISION [   ], AMS / CONFUSION [   ], SEIZURES [   ], WEAKNESS [   ],TINGLING / NUMBNESS [   ]    PHYSICAL EXAMINATION:  GENERAL APPEARANCE: NO DISTRESS  HEENT:  NO PALLOR, NO  JVD,  NO   NODES, NECK SUPPLE  CVS: S1 +, S2 +,   RS: AEEB,  OCCASIONAL  RALES +,   NO RONCHI  ABD: SOFT, NT, NO, BS +  EXT: NO PE  SKIN: WARM,   SKELETAL:  ROM ACCEPTABLE  CNS:  AAO X    ,   DEFICITS    RADIOLOGY :      ASSESSMENT :     Infection due to severe acute respiratory syndrome coronavirus 2 (SARS-CoV-2)    No pertinent past medical history    Hypertension    Adrenal insufficiency    CKD (chronic kidney disease)    Anemia    Glaucoma    Coronary artery disease    HLD (hyperlipidemia)    Peripheral vascular disease    Spinal stenosis of lumbosacral region    Hyperparathyroidism    Diabetes mellitus    Diabetic neuropathy    Contracture of hand    Osteoarthritis    Osteoporosis    Vision loss of left eye    ESRD on hemodialysis    Cataract    BPH (benign prostatic hyperplasia)    UTI (urinary tract infection)    Bladder mass    H/O hematuria    Osteoporosis    Vision loss of right eye    Depression    Chronic GERD    Osteomyelitis of vertebra    No significant past surgical history    Below knee amputation status, left    History of right cataract extraction    History of left cataract extraction    S/P arteriovenous (AV) fistula creation    H/O hematuria    H/O transurethral destruction of bladder lesion    History of excision of mass        PLAN:  HPI:  61M w/ PMHx of DM, HTN, GERD, CAD, ESRD on hemodialysis (T,Th,S), DM retinopath, Left leg amputation from [facility] living,  presents to the ED with vomiting. Pt states that he has been vomiting for the last 3 days around 3 times a day, after meals. Denies any hematemesis, abdominal pain, diarrhea or constipation. He also endorses cough and mild SOB. he states that he hasn't eaten his medications because of the vomiting. In the ED patient had episode of hypertensive emergency and SOB, he was placed on bipap temporarily, given bp meds, and fluids with improvement of oxygenation and BP. currently pt comfortable on NC. Patient tested + for covid in ED, as per nursing home records, pt only vaccinated x 1 in Jan 2021.  (23 Dec 2021 21:46)    -      Patient is a 61y old  Male who presents with a chief complaint of Vomiting (24 Dec 2021 08:49)    PATIENT IS SEEN AND EXAMINED IN MEDICAL FLOOR.    ALLERGIES:  fish (Rash)  liver (Anaphylaxis)  No Known Drug Allergies    VITALS:    Vital Signs Last 24 Hrs  T(C): 36.7 (24 Dec 2021 07:35), Max: 37.7 (23 Dec 2021 19:59)  T(F): 98 (24 Dec 2021 07:35), Max: 99.8 (23 Dec 2021 19:59)  HR: 95 (24 Dec 2021 07:35) (90 - 130)  BP: 165/102 (24 Dec 2021 07:35) (115/65 - 213/101)  BP(mean): --  RR: 18 (24 Dec 2021 07:35) (18 - 26)  SpO2: 97% (24 Dec 2021 07:35) (91% - 100%)    LABS:    CBC Full  -  ( 23 Dec 2021 13:55 )  WBC Count : 1.75 K/uL  RBC Count : 3.74 M/uL  Hemoglobin : 10.8 g/dL  Hematocrit : 33.1 %  Platelet Count - Automated : 62 K/uL  Mean Cell Volume : 88.5 fl  Mean Cell Hemoglobin : 28.9 pg  Mean Cell Hemoglobin Concentration : 32.6 gm/dL  Auto Neutrophil # : 1.40 K/uL  Auto Lymphocyte # : 0.21 K/uL  Auto Monocyte # : 0.09 K/uL  Auto Eosinophil # : 0.05 K/uL  Auto Basophil # : 0.00 K/uL  Auto Neutrophil % : 80.0 %  Auto Lymphocyte % : 12.0 %  Auto Monocyte % : 5.0 %  Auto Eosinophil % : 3.0 %  Auto Basophil % : 0.0 %    PT/INR - ( 23 Dec 2021 16:10 )   PT: 10.9 sec;   INR: 0.91 ratio         PTT - ( 23 Dec 2021 16:10 )  PTT:27.1 sec  12-23    140  |  100  |  46<H>  ----------------------------<  107<H>  4.4   |  29  |  11.90<H>    Ca    8.2<L>      23 Dec 2021 13:55  Phos  5.0     12-23  Mg     2.9     12-23    TPro  6.7  /  Alb  3.1<L>  /  TBili  0.9  /  DBili  x   /  AST  26  /  ALT  16  /  AlkPhos  60  12-23    CAPILLARY BLOOD GLUCOSE      POCT Blood Glucose.: 92 mg/dL (24 Dec 2021 09:00)  POCT Blood Glucose.: 82 mg/dL (23 Dec 2021 15:52)        LIVER FUNCTIONS - ( 23 Dec 2021 13:55 )  Alb: 3.1 g/dL / Pro: 6.7 g/dL / ALK PHOS: 60 U/L / ALT: 16 U/L DA / AST: 26 U/L / GGT: x           Creatinine Trend: 11.90<--  I&O's Summary      ABG - ( 23 Dec 2021 16:16 )  pH, Arterial: 7.34  pH, Blood: x     /  pCO2: 50    /  pO2: 406   / HCO3: 27    / Base Excess: 0.5   /  SaO2: 97          MEDICATIONS:    MEDICATIONS  (STANDING):  amLODIPine   Tablet 10 milliGRAM(s) Oral daily  aspirin enteric coated 81 milliGRAM(s) Oral daily  cholecalciferol 1000 Unit(s) Oral daily  dexAMETHasone  Injectable 6 milliGRAM(s) IV Push daily  ferrous    sulfate 325 milliGRAM(s) Oral daily  folic acid 1 milliGRAM(s) Oral daily  heparin   Injectable 5000 Unit(s) SubCutaneous every 12 hours  hydrALAZINE 10 milliGRAM(s) Oral three times a day  insulin lispro (ADMELOG) corrective regimen sliding scale   SubCutaneous three times a day before meals  insulin lispro (ADMELOG) corrective regimen sliding scale   SubCutaneous at bedtime  metoclopramide 5 milliGRAM(s) Oral three times a day  nortriptyline 10 milliGRAM(s) Oral at bedtime  pantoprazole    Tablet 40 milliGRAM(s) Oral before breakfast  sevelamer carbonate 2400 milliGRAM(s) Oral three times a day with meals  simvastatin 40 milliGRAM(s) Oral at bedtime  sucralfate suspension 1 Gram(s) Oral four times a day      MEDICATIONS  (PRN):  ALBUTerol    90 MICROgram(s) HFA Inhaler 2 Puff(s) Inhalation every 4 hours PRN Shortness of Breath and/or Wheezing  benzonatate 100 milliGRAM(s) Oral three times a day PRN Cough  zolpidem 5 milliGRAM(s) Oral at bedtime PRN Insomnia      REVIEW OF SYSTEMS:                           ALL ROS DONE [ X   ]    CONSTITUTIONAL:  LETHARGIC [   ], FEVER [   ], UNRESPONSIVE [   ]  CVS:  CP  [   ], SOB, [   ], PALPITATIONS [   ], DIZZYNESS [   ]  RS: COUGH [   ], SPUTUM [   ]  GI: ABDOMINAL PAIN [   ], NAUSEA [   ], VOMITINGS [   ], DIARRHEA [   ], CONSTIPATION [   ]  :  DYSURIA [   ], NOCTURIA [   ], INCREASED FREQUENCY [   ], DRIBLING [   ],  SKELETAL: PAINFUL JOINTS [   ], SWOLLEN JOINTS [   ], NECK ACHE [   ], LOW BACK ACHE [   ],  SKIN : ULCERS [   ], RASH [   ], ITCHING [   ]  CNS: HEAD ACHE [   ], DOUBLE VISION [   ], BLURRED VISION [   ], AMS / CONFUSION [   ], SEIZURES [   ], WEAKNESS [   ],TINGLING / NUMBNESS [   ]    PHYSICAL EXAMINATION:  GENERAL APPEARANCE: NO DISTRESS  HEENT:  NO PALLOR, NO  JVD,  NO   NODES, NECK SUPPLE  CVS: S1 +, S2 +,   RS: AEEB,  OCCASIONAL  RALES +,   NO RONCHI  ABD: SOFT, NT, NO, BS +  EXT: NO PE   , LEFT BKA +  SKIN: WARM, RIGHT UPPER EXTREMITY AF +  SKELETAL:  ROM ACCEPTABLE  CNS:  AAO X 3,  VISUALLY IMPAIRED +    RADIOLOGY :      ACC: [id number] EXAM:  XR CHEST PORTABLE URGENT 1V                          PROCEDURE DATE:  12/23/2021          INTERPRETATION:  CLINICAL STATEMENT: Chest Pain.    TECHNIQUE: AP view of the chest.    COMPARISON: 11/7/2021    FINDINGS/  IMPRESSION:  No consolidation or pleural effusion    Heart size cannot be accurately assessed in this projection.    Right axillary stent again noted    ASSESSMENT :     Infection due to severe acute respiratory syndrome coronavirus 2 (SARS-CoV-2)    No pertinent past medical history    Hypertension    Adrenal insufficiency    CKD (chronic kidney disease)    Anemia    Glaucoma    Coronary artery disease    HLD (hyperlipidemia)    Peripheral vascular disease    Spinal stenosis of lumbosacral region    Hyperparathyroidism    Diabetes mellitus    Diabetic neuropathy    Contracture of hand    Osteoarthritis    Osteoporosis    Vision loss of left eye    ESRD on hemodialysis    Cataract    BPH (benign prostatic hyperplasia)    UTI (urinary tract infection)    Bladder mass    H/O hematuria    Osteoporosis    Vision loss of right eye    Depression    Chronic GERD    Osteomyelitis of vertebra    No significant past surgical history    Below knee amputation status, left    History of right cataract extraction    History of left cataract extraction    S/P arteriovenous (AV) fistula creation    H/O hematuria    H/O transurethral destruction of bladder lesion    History of excision of mass        PLAN:  HPI:  61M w/ PMHx of DM, HTN, GERD, CAD, ESRD on hemodialysis (T,Th,S), DM retinopath, Left leg amputation from [facility] assisted living,  presents to the ED with vomiting. Pt states that he has been vomiting for the last 3 days around 3 times a day, after meals. Denies any hematemesis, abdominal pain, diarrhea or constipation. He also endorses cough and mild SOB. he states that he hasn't eaten his medications because of the vomiting. In the ED patient had episode of hypertensive emergency and SOB, he was placed on bipap temporarily, given bp meds, and fluids with improvement of oxygenation and BP. currently pt comfortable on NC. Patient tested + for covid in ED, as per nursing home records, pt only vaccinated x 1 in Jan 2021.  (23 Dec 2021 21:46)    # ACUTE HYPOXIC RESPIRATORY FAILURE S/T COVID19 INFECTION - CXR, SUPPLEMENTAL PO2, MONITORING INFLAMMATORY MARKERS  - DROPLET AND CONTACT ISOLATION  - PLACED ON DECADRON  - DOES NOT QUALIFY FOR REMDESEVIR AS PATIENT IS ESRD    # HYPERTENSIVE URGENCY - RESOLVED, ON HYDRALAZINE + AMLODIPINE    # ELEVATED TROPONINS - ? DEMAND - PATIENT REFUSED TELEMETRY [COUNSELLED AT LENGTH ABOUT MORTALITY RISK], F/U CK AND CKMB  - CARDIOLOGY CONSULT IN PROGRESS    # RECURRENT EMESIS W/ HISTORY OF ESOPHAGITIS AND DUODENITIS [1/2021], HX OF GASTROPARESIS W/ CURRENT EVIDENCE OF THICKENED ESOPHAGUS ON CT SCAN [11/9]   - MONITORING HGB, PLACED ON PPI BID, CARAFATE, PRN ANTIEMETICS, GASTROENTEROLOGY CONSULT  - RESUMED DIET PER GI  - PATIENT AGREES TO CONSIDER EGD, IF HE HAS REPEAT EMESIS HERE - COUNSELLED REGARDING MORTALITY RISK OF NOT PURSUING ENDOSCOPING EVALUATION. AGREEABLE FOR CT CHEST/ABD/PELV     # PANCYTOPENIA - F/U JAMEEL, HIV, RPR, ANEMIA PANEL  - IF PERSISTS, CONSULT HEME/ONC CONSULT    # SEVERE PROTEIN CALORIE MALNUTRITION, FAILURE TO THRIVE - NUTRITIONAL SUPPLEMENT  # ANEMIA OF CKD  # HLD  # ESRD ON HD TTS - W/ HX OF NONCOMPLIANCE - NEPHROLOGY CONSULT IN PROGRESS  # HTN  # DM   # PARTIALLY BLIND  # S/P LEFT BKA  # HX OF PVD  # LS SPINAL STENOSIS  # GI AND DVT PPX    SHAKIR MAC MD COVERING KUSH MAC MD         Patient is a 61y old  Male who presents with a chief complaint of Vomiting (24 Dec 2021 08:49)    PATIENT IS SEEN AND EXAMINED IN MEDICAL FLOOR.    ALLERGIES:  fish (Rash)  liver (Anaphylaxis)  No Known Drug Allergies    VITALS:    Vital Signs Last 24 Hrs  T(C): 36.7 (24 Dec 2021 07:35), Max: 37.7 (23 Dec 2021 19:59)  T(F): 98 (24 Dec 2021 07:35), Max: 99.8 (23 Dec 2021 19:59)  HR: 95 (24 Dec 2021 07:35) (90 - 130)  BP: 165/102 (24 Dec 2021 07:35) (115/65 - 213/101)  BP(mean): --  RR: 18 (24 Dec 2021 07:35) (18 - 26)  SpO2: 97% (24 Dec 2021 07:35) (91% - 100%)    LABS:    CBC Full  -  ( 23 Dec 2021 13:55 )  WBC Count : 1.75 K/uL  RBC Count : 3.74 M/uL  Hemoglobin : 10.8 g/dL  Hematocrit : 33.1 %  Platelet Count - Automated : 62 K/uL  Mean Cell Volume : 88.5 fl  Mean Cell Hemoglobin : 28.9 pg  Mean Cell Hemoglobin Concentration : 32.6 gm/dL  Auto Neutrophil # : 1.40 K/uL  Auto Lymphocyte # : 0.21 K/uL  Auto Monocyte # : 0.09 K/uL  Auto Eosinophil # : 0.05 K/uL  Auto Basophil # : 0.00 K/uL  Auto Neutrophil % : 80.0 %  Auto Lymphocyte % : 12.0 %  Auto Monocyte % : 5.0 %  Auto Eosinophil % : 3.0 %  Auto Basophil % : 0.0 %    PT/INR - ( 23 Dec 2021 16:10 )   PT: 10.9 sec;   INR: 0.91 ratio         PTT - ( 23 Dec 2021 16:10 )  PTT:27.1 sec  12-23    140  |  100  |  46<H>  ----------------------------<  107<H>  4.4   |  29  |  11.90<H>    Ca    8.2<L>      23 Dec 2021 13:55  Phos  5.0     12-23  Mg     2.9     12-23    TPro  6.7  /  Alb  3.1<L>  /  TBili  0.9  /  DBili  x   /  AST  26  /  ALT  16  /  AlkPhos  60  12-23    CAPILLARY BLOOD GLUCOSE      POCT Blood Glucose.: 92 mg/dL (24 Dec 2021 09:00)  POCT Blood Glucose.: 82 mg/dL (23 Dec 2021 15:52)        LIVER FUNCTIONS - ( 23 Dec 2021 13:55 )  Alb: 3.1 g/dL / Pro: 6.7 g/dL / ALK PHOS: 60 U/L / ALT: 16 U/L DA / AST: 26 U/L / GGT: x           Creatinine Trend: 11.90<--  I&O's Summary      ABG - ( 23 Dec 2021 16:16 )  pH, Arterial: 7.34  pH, Blood: x     /  pCO2: 50    /  pO2: 406   / HCO3: 27    / Base Excess: 0.5   /  SaO2: 97          MEDICATIONS:    MEDICATIONS  (STANDING):  amLODIPine   Tablet 10 milliGRAM(s) Oral daily  aspirin enteric coated 81 milliGRAM(s) Oral daily  cholecalciferol 1000 Unit(s) Oral daily  dexAMETHasone  Injectable 6 milliGRAM(s) IV Push daily  ferrous    sulfate 325 milliGRAM(s) Oral daily  folic acid 1 milliGRAM(s) Oral daily  heparin   Injectable 5000 Unit(s) SubCutaneous every 12 hours  hydrALAZINE 10 milliGRAM(s) Oral three times a day  insulin lispro (ADMELOG) corrective regimen sliding scale   SubCutaneous three times a day before meals  insulin lispro (ADMELOG) corrective regimen sliding scale   SubCutaneous at bedtime  metoclopramide 5 milliGRAM(s) Oral three times a day  nortriptyline 10 milliGRAM(s) Oral at bedtime  pantoprazole    Tablet 40 milliGRAM(s) Oral before breakfast  sevelamer carbonate 2400 milliGRAM(s) Oral three times a day with meals  simvastatin 40 milliGRAM(s) Oral at bedtime  sucralfate suspension 1 Gram(s) Oral four times a day      MEDICATIONS  (PRN):  ALBUTerol    90 MICROgram(s) HFA Inhaler 2 Puff(s) Inhalation every 4 hours PRN Shortness of Breath and/or Wheezing  benzonatate 100 milliGRAM(s) Oral three times a day PRN Cough  zolpidem 5 milliGRAM(s) Oral at bedtime PRN Insomnia      REVIEW OF SYSTEMS:                           ALL ROS DONE [ X   ]    CONSTITUTIONAL:  LETHARGIC [   ], FEVER [   ], UNRESPONSIVE [   ]  CVS:  CP  [   ], SOB, [   ], PALPITATIONS [   ], DIZZYNESS [   ]  RS: COUGH [   ], SPUTUM [   ]  GI: ABDOMINAL PAIN [   ], NAUSEA [   ], VOMITINGS [   ], DIARRHEA [   ], CONSTIPATION [   ]  :  DYSURIA [   ], NOCTURIA [   ], INCREASED FREQUENCY [   ], DRIBLING [   ],  SKELETAL: PAINFUL JOINTS [   ], SWOLLEN JOINTS [   ], NECK ACHE [   ], LOW BACK ACHE [   ],  SKIN : ULCERS [   ], RASH [   ], ITCHING [   ]  CNS: HEAD ACHE [   ], DOUBLE VISION [   ], BLURRED VISION [   ], AMS / CONFUSION [   ], SEIZURES [   ], WEAKNESS [   ],TINGLING / NUMBNESS [   ]    PHYSICAL EXAMINATION:  GENERAL APPEARANCE: NO DISTRESS  HEENT:  NO PALLOR, NO  JVD,  NO   NODES, NECK SUPPLE  CVS: S1 +, S2 +,   RS: AEEB,  OCCASIONAL  RALES +,   NO RONCHI  ABD: SOFT, NT, NO, BS +  EXT: NO PE   , LEFT BKA +  SKIN: WARM, RIGHT UPPER EXTREMITY AF +  SKELETAL:  ROM ACCEPTABLE  CNS:  AAO X 3,  VISUALLY IMPAIRED +    RADIOLOGY :      ACC: [id number] EXAM:  XR CHEST PORTABLE URGENT 1V                          PROCEDURE DATE:  12/23/2021          INTERPRETATION:  CLINICAL STATEMENT: Chest Pain.    TECHNIQUE: AP view of the chest.    COMPARISON: 11/7/2021    FINDINGS/  IMPRESSION:  No consolidation or pleural effusion    Heart size cannot be accurately assessed in this projection.    Right axillary stent again noted    ASSESSMENT :     Infection due to severe acute respiratory syndrome coronavirus 2 (SARS-CoV-2)    No pertinent past medical history    Hypertension    Adrenal insufficiency    CKD (chronic kidney disease)    Anemia    Glaucoma    Coronary artery disease    HLD (hyperlipidemia)    Peripheral vascular disease    Spinal stenosis of lumbosacral region    Hyperparathyroidism    Diabetes mellitus    Diabetic neuropathy    Contracture of hand    Osteoarthritis    Osteoporosis    Vision loss of left eye    ESRD on hemodialysis    Cataract    BPH (benign prostatic hyperplasia)    UTI (urinary tract infection)    Bladder mass    H/O hematuria    Osteoporosis    Vision loss of right eye    Depression    Chronic GERD    Osteomyelitis of vertebra    No significant past surgical history    Below knee amputation status, left    History of right cataract extraction    History of left cataract extraction    S/P arteriovenous (AV) fistula creation    H/O hematuria    H/O transurethral destruction of bladder lesion    History of excision of mass        PLAN:  HPI:  61M w/ PMHx of DM, HTN, GERD, CAD, ESRD on hemodialysis (T,Th,S), DM retinopath, Left leg amputation from [facility] assisted living,  presents to the ED with vomiting. Pt states that he has been vomiting for the last 3 days around 3 times a day, after meals. Denies any hematemesis, abdominal pain, diarrhea or constipation. He also endorses cough and mild SOB. he states that he hasn't eaten his medications because of the vomiting. In the ED patient had episode of hypertensive emergency and SOB, he was placed on bipap temporarily, given bp meds, and fluids with improvement of oxygenation and BP. currently pt comfortable on NC. Patient tested + for covid in ED, as per nursing home records, pt only vaccinated x 1 in Jan 2021.  (23 Dec 2021 21:46)    # ACUTE HYPOXIC RESPIRATORY FAILURE S/T COVID19 INFECTION - CXR, SUPPLEMENTAL PO2, MONITORING INFLAMMATORY MARKERS  - DROPLET AND CONTACT ISOLATION  - PLACED ON DECADRON  - DOES NOT QUALIFY FOR REMDESEVIR AS PATIENT IS ESRD    # HYPERTENSIVE URGENCY - RESOLVED, ON HYDRALAZINE + AMLODIPINE    # ELEVATED TROPONINS - ? DEMAND - PATIENT REFUSED TELEMETRY [COUNSELLED AT LENGTH ABOUT MORTALITY RISK], F/U CK AND CKMB  - CARDIOLOGY CONSULT IN PROGRESS    # RECURRENT EMESIS W/ HISTORY OF ESOPHAGITIS AND DUODENITIS [1/2021], HX OF GASTROPARESIS W/ CURRENT EVIDENCE OF THICKENED ESOPHAGUS ON CT SCAN [11/9]   - MONITORING HGB, PLACED ON PPI BID, CARAFATE, PRN ANTIEMETICS, GASTROENTEROLOGY CONSULT  - RESUMED DIET PER GI  - PATIENT AGREES TO CONSIDER EGD, IF HE HAS REPEAT EMESIS HERE - COUNSELLED REGARDING MORTALITY RISK OF NOT PURSUING ENDOSCOPING EVALUATION. AGREEABLE FOR CT CHEST/ABD/PELV     # PANCYTOPENIA - F/U JAMEEL, HIV, RPR, ANEMIA PANEL  # HX OF ANEMIA OF CKD  - IF PERSISTS, CONSULT HEME/ONC CONSULT    # SEVERE PROTEIN CALORIE MALNUTRITION, FAILURE TO THRIVE - NUTRITIONAL SUPPLEMENT  # ANEMIA OF CKD  # HLD  # ESRD ON HD TTS - W/ HX OF NONCOMPLIANCE - NEPHROLOGY CONSULT IN PROGRESS  # HTN  # DM   # PARTIALLY BLIND  # S/P LEFT BKA  # HX OF PVD  # LS SPINAL STENOSIS  # GI AND DVT PPX    SHAKIR MAC MD COVERING KUSH MAC MD

## 2021-12-24 NOTE — CHART NOTE - NSCHARTNOTEFT_GEN_A_CORE
EVENT: Troponin I, High Sensitivity Result: 242.8:  ng/L (12.24.21 @ 00:56)    Troponin I, High Sensitivity Result: 188.4:  ng/L (12.23.21 @ 16:10)      BRIEF HPI:        OBJECTIVE:  Vital Signs Last 24 Hrs  T(C): 37.4 (23 Dec 2021 23:32), Max: 37.7 (23 Dec 2021 19:59)  T(F): 99.3 (23 Dec 2021 23:32), Max: 99.8 (23 Dec 2021 19:59)  HR: 99 (23 Dec 2021 23:32) (99 - 130)  BP: 144/73 (23 Dec 2021 23:32) (144/73 - 213/101)  BP(mean): --  RR: 20 (23 Dec 2021 23:32) (18 - 26)  SpO2: 96% (23 Dec 2021 23:32) (91% - 100%)    FOCUSED PHYSICAL EXAM:  NEURO:  RESP:  CV:      LABS:                        10.8   1.75  )-----------( 62       ( 23 Dec 2021 13:55 )             33.1     12-23    140  |  100  |  46<H>  ----------------------------<  107<H>  4.4   |  29  |  11.90<H>    Ca    8.2<L>      23 Dec 2021 13:55  Phos  5.0     12-23  Mg     2.9     12-23    TPro  6.7  /  Alb  3.1<L>  /  TBili  0.9  /  DBili  x   /  AST  26  /  ALT  16  /  AlkPhos  60  12-23      EKG:   IMGAGING:    ASSESSMENT:  HPI:  61M w/ PMHx of DM, HTN, GERD, CAD, ESRD on hemodialysis (T,Th,S), DM retinopath, Left leg amputation from [facility],  presents to the ED with vomiting. Pt states that he has been vomiting for the last 3 days around 3 times a day, after meals. Denies any hematemesis, abdominal pain, diarrhea or constipation. He also endorses cough and mild SOB. he states that he hasn't eaten his medications because of the vomiting. In the ED patient had episode of hypertensive emergency and SOB, he was placed on bipap temporarily, given bp meds, and fluids with improvement of oxygenation and BP. currently pt comfortable on NC. Patient tested + for covid in ED, as per nursing home records, pt only vaccinated x 1 in Jan 2021.  (23 Dec 2021 21:46)      PLAN:     FOLLOW UP / RESULT: EVENT: Troponin I, High Sensitivity Result: 242.8:  ng/L (12.24.21 @ 00:56)  Troponin I, High Sensitivity Result: 188.4:  ng/L (12.23.21 @ 16:10)    BRIEF HPI: 61M w/ PMHx of DM, HTN, GERD, CAD, ESRD on hemodialysis, DM retinopath, Left leg amputation from [facility] assisted living,  presents to the ED with vomiting. Pt with episode of HTN emergency in ED and found to be Covid +.    OBJECTIVE:  Vital Signs Last 24 Hrs  T(C): 37.4 (23 Dec 2021 23:32), Max: 37.7 (23 Dec 2021 19:59)  T(F): 99.3 (23 Dec 2021 23:32), Max: 99.8 (23 Dec 2021 19:59)  HR: 99 (23 Dec 2021 23:32) (99 - 130)  BP: 144/73 (23 Dec 2021 23:32) (144/73 - 213/101)  BP(mean): --  RR: 20 (23 Dec 2021 23:32) (18 - 26)  SpO2: 96% (23 Dec 2021 23:32) (91% - 100%)    FOCUSED PHYSICAL EXAM: See inpatient physical assessment  CV: denies chest pain  LABS:                        10.8   1.75  )-----------( 62       ( 23 Dec 2021 13:55 )             33.1     12-23    140  |  100  |  46<H>  ----------------------------<  107<H>  4.4   |  29  |  11.90<H>    Ca    8.2<L>      23 Dec 2021 13:55  Phos  5.0     12-23  Mg     2.9     12-23    TPro  6.7  /  Alb  3.1<L>  /  TBili  0.9  /  DBili  x   /  AST  26  /  ALT  16  /  AlkPhos  60  12-23    EKG: (P)  PROBLEM: Elevated troponin probably due to demand ischemia  PLAN:  1. Admit to telemetry for 24 hrs  2. EKG now  3. Trend troponin in AM    FOLLOW UP / RESULT: Third troponin

## 2021-12-24 NOTE — CONSULT NOTE ADULT - ASSESSMENT
61 year old male with nausea vomiting and covid     nausea/ vomiting   Can have multiple etiologies Hypertensive urgency, covid, uremia)  Treat underlying issues   PPI   Zofran PRN   Advance diet as tolerated   IF persistent then CT scan of the abdomen pelvis     ESRD  as per nephrology     COVID   AS per primary team 
A/P  ESRD ON  HD  HAD  HIS  HD  WED AT  THE  DIALYSIS CENTER AS  HE  MISSED  HD  ON  TUES WILL SCHEDULE  FO R HD  TODAY   CONVINCED TO GO FOR  HD  TODAY     AS  PER ER  STAFF  HAS BEEN  REFUSING MEDS AT TIMES,  BP IS  NOT WELL CONTROLLED    ADV TO TAKE MEDS ON TIME   HAS NO  VOL  EXCESS    ELECTROLYTES OK     IS COVID POSITIVE

## 2021-12-24 NOTE — PROGRESS NOTE ADULT - PROBLEM SELECTOR PLAN 1
Pt with episode of HTN emergency in the ED, developed SOB  s/p Nitroglycerin, amlodipine and hydralazine with improvement in bp  temporary period of bipap, now in room air saturating well  Xray clear, no pulmonary edema, f/u official read  Troponin 188, likely in setting of demand ischemia  EKG: Sinus tachy, non specific T wave changes  f/u T2  tele monitoring  Cardio: Dr. Still Pt with episode of HTN emergency in the ED, developed SOB  s/p Nitroglycerin, amlodipine and hydralazine with improvement in bp  temporary placed on BIPAP then titrated down to RA, now on O2 3L/min via N-C   saturating 97%, NO cough no SOB  CXR ; clear   Troponin 188- 242 trending up but pt refused tele monitoring, refused 3rd troponin   elevated troponin likely in setting of demand ischemia   EKG: Sinus tachy, non specific T wave changes  Cardio: Dr. Still - Dr. Gonzales evaluated pt, d/christy tele will try to obtain CPK and CKMB

## 2021-12-24 NOTE — CONSULT NOTE ADULT - SUBJECTIVE AND OBJECTIVE BOX
Patient is a 61y old  Male who presents with a chief complaint of Vomiting (24 Dec 2021 08:49)     .     HPI:      61M w/ PMHx of DM, HTN, GERD, CAD, ESRD on hemodialysis (T,Th,S), DM retinopath, Left leg amputation from [facility] living,  presents to the ED with vomiting. Pt states that he has been vomiting for the last 3 days around 3 times a day, after meals. Denies any hematemesis, abdominal pain, diarrhea or constipation. He also endorses cough and mild SOB. he states that he hasn't eaten his medications because of the vomiting. In the ED patient had episode of hypertensive emergency and SOB, he was placed on bipap temporarily, given bp meds, and fluids with improvement of oxygenation and BP. currently pt comfortable on NC. Patient tested + for covid in ED, as per nursing home records, pt only vaccinated x 1 in Jan 2021.  (23 Dec 2021 21:46)      REVIEW OF SYSTEMS  Constitutional:   No fever, no fatigue, no pallor, no night sweats, no weight loss.  HEENT:   No eye pain, no vision changes, no icterus, no mouth ulcers.  Respiratory:   No shortness of breath, no cough, no respiratory distress.   Cardiovascular:   No chest pain, no palpitations.   Gastrointestinal: No abdominal pain, no nausea, + vomiting , no diahrrea, no constipation, no hematochezia,no melena.  Skin:   No rashes, no jaundice, no eczema.   Musculoskeletal:   No joint pain, no swelling, no myalgia.   Neurologic:   No headache, no seizure, no weakness.   Genitourinary:   No dysuria, no decreased urine output.  Psychiatric:  No depression, no anxiety,   Endocrine:   No thyroid disease, no diabetes.  Heme/Lymphatic:   No anemia, no blood transfusions, no lymph node enlargement, no bleeding, no bruising.  ___________________________________________________________________________________________  Allergies    fish (Rash)  liver (Anaphylaxis)  No Known Drug Allergies    Intolerances      MEDICATIONS  (STANDING):  amLODIPine   Tablet 10 milliGRAM(s) Oral daily  aspirin enteric coated 81 milliGRAM(s) Oral daily  cholecalciferol 1000 Unit(s) Oral daily  dexAMETHasone  Injectable 6 milliGRAM(s) IV Push daily  ferrous    sulfate 325 milliGRAM(s) Oral daily  folic acid 1 milliGRAM(s) Oral daily  heparin   Injectable 5000 Unit(s) SubCutaneous every 12 hours  hydrALAZINE 10 milliGRAM(s) Oral three times a day  insulin lispro (ADMELOG) corrective regimen sliding scale   SubCutaneous three times a day before meals  insulin lispro (ADMELOG) corrective regimen sliding scale   SubCutaneous at bedtime  metoclopramide 5 milliGRAM(s) Oral three times a day  nortriptyline 10 milliGRAM(s) Oral at bedtime  pantoprazole    Tablet 40 milliGRAM(s) Oral before breakfast  sevelamer carbonate 2400 milliGRAM(s) Oral three times a day with meals  simvastatin 40 milliGRAM(s) Oral at bedtime  sucralfate suspension 1 Gram(s) Oral four times a day    MEDICATIONS  (PRN):  ALBUTerol    90 MICROgram(s) HFA Inhaler 2 Puff(s) Inhalation every 4 hours PRN Shortness of Breath and/or Wheezing  benzonatate 100 milliGRAM(s) Oral three times a day PRN Cough  zolpidem 5 milliGRAM(s) Oral at bedtime PRN Insomnia      PAST MEDICAL & SURGICAL HISTORY:  Hypertension    Adrenal insufficiency    Anemia    Glaucoma    Coronary artery disease    HLD (hyperlipidemia)    Peripheral vascular disease    Spinal stenosis of lumbosacral region    Hyperparathyroidism    Diabetes mellitus    Diabetic neuropathy    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    Osteoarthritis    Vision loss of left eye    ESRD on hemodialysis    Cataract  both eyes - hx of sx done    BPH (benign prostatic hyperplasia)    UTI (urinary tract infection)  hx of    Bladder mass  hx of    H/O hematuria    Osteoporosis    Vision loss of right eye    Depression    Chronic GERD    Osteomyelitis of vertebra    Below knee amputation status, left  2012- pt is wearing prostesis    History of right cataract extraction    History of left cataract extraction    S/P arteriovenous (AV) fistula creation  right arm brachiocephalic arteriovenous fistula on 11/08/2018    H/O hematuria  s/p bladder bx and fulguration 2/25/2020    H/O transurethral destruction of bladder lesion  2020    History of excision of mass  back mass on 03/31/2021      FAMILY HISTORY:  Family history of cirrhosis of liver (Mother)    Family history of renal failure (Sibling)    Family history of hypertension (Sibling)    Family history of diabetes mellitus (Sibling)    History of substance abuse in sibling (Sibling)      Social History: No hsitory of : Tobacco use, IVDA, EToH  ______________________________________________________________________________________    PHYSICAL EXAM    Daily     Daily   BMI: 19 (12-23 @ 11:13)  Change in Weight:  Vital Signs Last 24 Hrs  T(C): 36.7 (24 Dec 2021 07:35), Max: 37.7 (23 Dec 2021 19:59)  T(F): 98 (24 Dec 2021 07:35), Max: 99.8 (23 Dec 2021 19:59)  HR: 95 (24 Dec 2021 07:35) (90 - 130)  BP: 165/102 (24 Dec 2021 07:35) (115/65 - 213/101)  BP(mean): --  RR: 18 (24 Dec 2021 07:35) (18 - 26)  SpO2: 97% (24 Dec 2021 07:35) (91% - 100%)    General:  Well developed, well nourished, alert and active, no pallor, NAD.  HEENT:    Normal appearance of conjunctiva, ears, nose, lips, oropharynx, and oral mucosa, anicteric.  Neck:  No masses, no asymmetry.  Lymph Nodes:  No lymphadenopathy.   Cardiovascular:  RRR normal S1/S2, no murmur.  Respiratory:  CTA B/L, normal respiratory effort.   Abdominal:   soft, no masses or tenderness, normoactive BS, NT/ND, no HSM.  Extremities:   No clubbing or cyanosis, normal capillary refill, no edema.   Skin:   No rash, jaundice, lesions, eczema.   Musculoskeletal:  No joint swelling, erythema or tenderness.   Neuro: No focal deficits.   Other:   _______________________________________________________________________________________________  Lab Results:                          10.8   1.75  )-----------( 62       ( 23 Dec 2021 13:55 )             33.1     12-23    140  |  100  |  46<H>  ----------------------------<  107<H>  4.4   |  29  |  11.90<H>    Ca    8.2<L>      23 Dec 2021 13:55  Phos  5.0     12-23  Mg     2.9     12-23    TPro  6.7  /  Alb  3.1<L>  /  TBili  0.9  /  DBili  x   /  AST  26  /  ALT  16  /  AlkPhos  60  12-23    LIVER FUNCTIONS - ( 23 Dec 2021 13:55 )  Alb: 3.1 g/dL / Pro: 6.7 g/dL / ALK PHOS: 60 U/L / ALT: 16 U/L DA / AST: 26 U/L / GGT: x           PT/INR - ( 23 Dec 2021 16:10 )   PT: 10.9 sec;   INR: 0.91 ratio         PTT - ( 23 Dec 2021 16:10 )  PTT:27.1 sec        Stool Results:          RADIOLOGY RESULTS:    SURGICAL PATHOLOGY:     
Cardiology     HISTORY OF PRESENT ILLNESS: HPI:  61M w/ PMHx of DM, HTN, GERD, CAD, ESRD on hemodialysis (T,Th,S), DM retinopath, Left leg amputation from [facility] assisted living,  presents to the ED with vomiting. Pt states that he has been vomiting for the last 3 days around 3 times a day, after meals. Denies any hematemesis, abdominal pain, diarrhea or constipation. He also endorses cough and mild SOB. he states that he hasn't eaten his medications because of the vomiting. In the ED patient had episode of hypertensive emergency and SOB, he was placed on bipap temporarily, given bp meds, and fluids with improvement of oxygenation and BP. currently pt comfortable on NC. Patient tested + for covid in ED, as per nursing home records, pt only vaccinated x 1 in Jan 2021.  (23 Dec 2021 21:46)    Patient refusing meds at [facility], and refusing medication here.  Able to lie flat.  States he still feels feverish and is upset about missing Vinny, and misses his girlfriend.    No angina, no palpitations, no lightheadedness, no vision changes or headaches, no fainting. A 10 pt ROS is otherwise negative.    PAST MEDICAL & SURGICAL HISTORY:  Hypertension    Adrenal insufficiency    Anemia    Glaucoma    Coronary artery disease    HLD (hyperlipidemia)    Peripheral vascular disease    Spinal stenosis of lumbosacral region    Hyperparathyroidism    Diabetes mellitus    Diabetic neuropathy    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    Osteoarthritis    Vision loss of left eye    ESRD on hemodialysis    Cataract  both eyes - hx of sx done    BPH (benign prostatic hyperplasia)    UTI (urinary tract infection)  hx of    Bladder mass  hx of    H/O hematuria    Osteoporosis    Vision loss of right eye    Depression    Chronic GERD    Osteomyelitis of vertebra    Below knee amputation status, left  2012- pt is wearing prostesis    History of right cataract extraction    History of left cataract extraction    S/P arteriovenous (AV) fistula creation  right arm brachiocephalic arteriovenous fistula on 11/08/2018    H/O hematuria  s/p bladder bx and fulguration 2/25/2020    H/O transurethral destruction of bladder lesion  2020    History of excision of mass  back mass on 03/31/2021    MEDICATIONS  (STANDING):  amLODIPine   Tablet 10 milliGRAM(s) Oral daily  aspirin enteric coated 81 milliGRAM(s) Oral daily  cholecalciferol 1000 Unit(s) Oral daily  dexAMETHasone  Injectable 6 milliGRAM(s) IV Push daily  ferrous    sulfate 325 milliGRAM(s) Oral daily  folic acid 1 milliGRAM(s) Oral daily  heparin   Injectable 5000 Unit(s) SubCutaneous every 12 hours  hydrALAZINE 10 milliGRAM(s) Oral three times a day  insulin lispro (ADMELOG) corrective regimen sliding scale   SubCutaneous three times a day before meals  insulin lispro (ADMELOG) corrective regimen sliding scale   SubCutaneous at bedtime  metoclopramide 5 milliGRAM(s) Oral three times a day  nortriptyline 10 milliGRAM(s) Oral at bedtime  pantoprazole    Tablet 40 milliGRAM(s) Oral before breakfast  sevelamer carbonate 2400 milliGRAM(s) Oral three times a day with meals  simvastatin 40 milliGRAM(s) Oral at bedtime  sucralfate suspension 1 Gram(s) Oral four times a day      Allergies    fish (Rash)  liver (Anaphylaxis)  No Known Drug Allergies    Intolerances      FAMILY HISTORY:  Family history of cirrhosis of liver (Mother)    Family history of renal failure (Sibling)    Family history of hypertension (Sibling)    Family history of diabetes mellitus (Sibling)    History of substance abuse in sibling (Sibling)      Non-contributary for premature coronary disease or sudden cardiac death    SOCIAL HISTORY:    [x ] Non-smoker  [ ] Smoker  [ ] Alcohol    PHYSICAL EXAM:  T(C): 36.8 (12-24-21 @ 06:21), Max: 37.7 (12-23-21 @ 19:59)  HR: 90 (12-24-21 @ 06:21) (90 - 130)  BP: 115/65 (12-24-21 @ 06:21) (115/65 - 213/101)  RR: 20 (12-24-21 @ 06:21) (18 - 26)  SpO2: 99% (12-24-21 @ 06:21) (91% - 100%)  Wt(kg): --    Appearance: Normal appearing small frame adult male, in no acute distress	  HEENT:   Normal oral mucosa, PERRL, EOMI	  Lymphatic: No lymphadenopathy , no edema  Cardiovascular: Normal S1 S2, No JVD, No murmurs , Peripheral pulses palpable 2+ bilaterally, right shoulder fistula with good thrill + bruit.  Respiratory: Lungs clear to auscultation, normal effort 	  Gastrointestinal:  Soft, Non-tender, + BS	  Skin: No rashes, No ecchymoses, No cyanosis, hot to touch/ febrile.  Musculoskeletal: Normal range of motion, normal strength  Psychiatry:  Mood & affect appropriate    TELEMETRY: not on / refused	    ECG: NSR, LVH.  Echo: in 2017, EF normal / no significant LVH at that time.  	  LABS:	 	                          10.8   1.75  )-----------( 62       ( 23 Dec 2021 13:55 )             33.1     12-23    140  |  100  |  46<H>  ----------------------------<  107<H>  4.4   |  29  |  11.90<H>    Ca    8.2<L>      23 Dec 2021 13:55  Phos  5.0     12-23  Mg     2.9     12-23    TPro  6.7  /  Alb  3.1<L>  /  TBili  0.9  /  DBili  x   /  AST  26  /  ALT  16  /  AlkPhos  60  12-23    ASSESSMENT/PLAN: 	61y Male presenting with vomitng. Found to have COVID, fever, and hypertensive to the 200s.  Recommend medical management for emesis, fevers (oral tylenol).  Workup for pancytopenia.  (Due to COVID or is this another factor?)  COVID treatment per internal med +/- ID, as he is on a low dose of oxygen at this time.  Consider repeat dialysis, and insist upon timely dosing of oral antihypertensives.  No 'emergency' signs at this time.  Recommend obtaining full cardiac enzymes including CPK and CKMB.  Troponin is elevated but only mildly so, and I do not believe he is having a NSTEMI at this time.  Not recommending heparin infusion.  He may not require cardiac telemetry, consider downgrade to non-[facility] floor.  Will follow.    Darnell Gonzales M.D.  Cardiac Electrophysiology    office [phone]  pager [phone]
  Patient is a   61M w/ PMHx of DM, HTN, GERD, CAD, ESRD on hemodialysis (T,Th,S), DM retinopath, Left leg amputation from [facility] assisted living,  presents to the ED with vomiting. Pt states that he has been vomiting for the last 3 days around 3 times a day, after meals. Denies any hematemesis, abdominal pain, diarrhea or constipation. He also endorses cough and mild SOB. he states that he hasn't eaten his medications because of the vomiting. In the ED patient had episode of hypertensive emergency and SOB, he was placed on bipap temporarily, given bp meds, and fluids with improvement of oxygenation and BP.  FEELS  OK  AT THE  TIME  OF  EXAM  AND   IS RESISTING  GOING  FOR  HD  TODAY  PAST MEDICAL & SURGICAL HISTORY:  Hypertension    Adrenal insufficiency    Anemia    Glaucoma    Coronary artery disease    HLD (hyperlipidemia)    Peripheral vascular disease    Spinal stenosis of lumbosacral region    Hyperparathyroidism    Diabetes mellitus    Diabetic neuropathy    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    Osteoarthritis    Vision loss of left eye    ESRD on hemodialysis    Cataract  both eyes - hx of sx done    BPH (benign prostatic hyperplasia)    UTI (urinary tract infection)  hx of    Bladder mass  hx of    H/O hematuria    Osteoporosis    Vision loss of right eye    Depression    Chronic GERD    Osteomyelitis of vertebra    Below knee amputation status, left  2012- pt is wearing prostesis    History of right cataract extraction    History of left cataract extraction    S/P arteriovenous (AV) fistula creation  right arm brachiocephalic arteriovenous fistula on 11/08/2018    H/O hematuria  s/p bladder bx and fulguration 2/25/2020    H/O transurethral destruction of bladder lesion  2020    History of excision of mass  back mass on 03/31/2021      fish (Rash)  liver (Anaphylaxis)  No Known Drug Allergies    Home Medications Reviewed  Hospital Medications:   MEDICATIONS  (STANDING):  amLODIPine   Tablet 10 milliGRAM(s) Oral daily  aspirin enteric coated 81 milliGRAM(s) Oral daily  cholecalciferol 1000 Unit(s) Oral daily  dexAMETHasone  Injectable 6 milliGRAM(s) IV Push daily  ferrous    sulfate 325 milliGRAM(s) Oral daily  folic acid 1 milliGRAM(s) Oral daily  heparin   Injectable 5000 Unit(s) SubCutaneous every 12 hours  hydrALAZINE 10 milliGRAM(s) Oral three times a day  insulin lispro (ADMELOG) corrective regimen sliding scale   SubCutaneous three times a day before meals  insulin lispro (ADMELOG) corrective regimen sliding scale   SubCutaneous at bedtime  metoclopramide 5 milliGRAM(s) Oral three times a day  nortriptyline 10 milliGRAM(s) Oral at bedtime  pantoprazole    Tablet 40 milliGRAM(s) Oral before breakfast  sevelamer carbonate 2400 milliGRAM(s) Oral three times a day with meals  simvastatin 40 milliGRAM(s) Oral at bedtime  sucralfate suspension 1 Gram(s) Oral four times a day    SOCIAL HISTORY:  Denies ETOh,Smoking,   FAMILY HISTORY:  Family history of cirrhosis of liver (Mother)    Family history of renal failure (Sibling)    Family history of hypertension (Sibling)    Family history of diabetes mellitus (Sibling)    History of substance abuse in sibling (Sibling)      REVIEW OF SYSTEMS:    SAYS HE  FEELS BETTER    HAS NO  FEVER/CHILLS    NO  COUGH OR SOB    HAD  VOMITTING  YESTERDAY,  BETTER  NOW    FEELS HUNGRY    HAS NO  R  LEG SWELLING    VITALS:  T(F): 98 (12-24-21 @ 07:35), Max: 99.8 (12-23-21 @ 19:59)  HR: 95 (12-24-21 @ 07:35)  BP: 165/102 (12-24-21 @ 07:35)  RR: 18 (12-24-21 @ 07:35)  SpO2: 97% (12-24-21 @ 07:35)  Wt(kg): --      PHYSICAL EXAM:  Constitutional: NAD  HEENT: CONJ  PINK  Neck: No JVD  Respiratory: CTAB, no wheezes, rales or rhonchi  Cardiovascular: S1, S2, RRR  Gastrointestinal: BS+, soft, NT/ND  Extremities:  No peripheral edema  Neurological: A/O x 3,  :  No [place].     Vascular Access:  L UPPER  ARM AVF, WORKING WELL    LABS:  12-23    140  |  100  |  46<H>  ----------------------------<  107<H>  4.4   |  29  |  11.90<H>    Ca    8.2<L>      23 Dec 2021 13:55  Phos  5.0     12-23  Mg     2.9     12-23    TPro  6.7  /  Alb  3.1<L>  /  TBili  0.9  /  DBili      /  AST  26  /  ALT  16  /  AlkPhos  60  12-23    Creatinine Trend: 11.90 <--                        10.8   1.75  )-----------( 62       ( 23 Dec 2021 13:55 )             33.1     Urine Studies:      RADIOLOGY & ADDITIONAL STUDIES:

## 2021-12-24 NOTE — PATIENT PROFILE ADULT - FALL HARM RISK - HARM RISK INTERVENTIONS

## 2021-12-24 NOTE — PROGRESS NOTE ADULT - PROBLEM SELECTOR PLAN 2
Pt with vomiting for some time  last admission with hematemesis, denied EGD at that time, CT abdomen wnl  Continue reglan, ppi, zofran, carafate  Liquid diet  gentle hydration (ESRD)  F/u CT abdomen per previous document  pt refused EGD in 11/2021  alternatively obtained CT - with no acute bleeding or inflammation   presented with vomiting again, Dr Sierra consulted   continue pantoprazole and carafate

## 2021-12-24 NOTE — CHART NOTE - NSCHARTNOTEFT_GEN_A_CORE
61M w/ PMHx of DM, HTN, GERD, CAD, ESRD on hemodialysis (T,Th,S), DM retinopath, Left leg amputation from [facility] assisted living,  presents to the ED with vomiting. Pt states that he has been vomiting for the last 3 days around 3 times a day, after meals. Denies any hematemesis, abdominal pain, diarrhea or constipation. He also endorses cough and mild SOB. he states that he hasn't eaten his medications because of the vomiting. In the ED patient had episode of hypertensive emergency and SOB - IMPROVED S/P BIPAP. COVID +    # ACUTE HYPOXIC RESPIRATORY FAILURE S/T COVID19 INFECTION - CXR, SUPPLEMENTAL PO2, MONITORING INFLAMMATORY MARKERS  - ONLY ONE VACCINE DOSE 01/2021  - DROPLET AND CONTACT ISOLATION  - PLACED ON DECADRON  - DOES NOT QUALIFY FOR REMDESEVIR AS PATIENT IS ESRD    # HYPERTENSIVE URGENCY - RESOLVED, ON HYDRALAZINE + AMLODIPINE    # ELEVATED TROPONINS - ? DEMAND - PATIENT REFUSED TELEMETRY [COUNSELLED AT LENGTH ABOUT MORTALITY RISK], F/U CK AND CKMB  - CARDIOLOGY CONSULT IN PROGRESS    # RECURRENT EMESIS W/ HISTORY OF ESOPHAGITIS AND DUODENITIS [1/2021], HX OF GASTROPARESIS W/ CURRENT EVIDENCE OF THICKENED ESOPHAGUS ON CT SCAN [11/9]   - MONITORING HGB, PLACED ON PPI BID, CARAFATE, PRN ANTIEMETICS, GASTROENTEROLOGY CONSULT  - RESUMED DIET PER GI  - PATIENT AGREES TO CONSIDER EGD, IF HE HAS REPEAT EMESIS HERE - COUNSELLED REGARDING MORTALITY RISK OF NOT PURSUING ENDOSCOPING EVALUATION. AGREEABLE FOR CT CHEST/ABD/PELV     # PANCYTOPENIA - F/U JAMEEL, HIV, RPR, ANEMIA PANEL  # HX OF ANEMIA OF CKD  - IF PERSISTS, CONSULT HEME/ONC CONSULT    # SEVERE PROTEIN CALORIE MALNUTRITION, FAILURE TO THRIVE - NUTRITIONAL SUPPLEMENT  # ANEMIA OF CKD  # HLD  # ESRD ON HD TTS - W/ HX OF NONCOMPLIANCE - NEPHROLOGY CONSULT IN PROGRESS  # HTN  # DM   # PARTIALLY BLIND  # S/P LEFT BKA  # HX OF PVD  # LS SPINAL STENOSIS    TO DO:  # MONITOR INFLAMMATORY MARKERS  # F/U ANEMIA PANEL, JAMEEL, HIV, RPR  # F/U CT CHEST/ABD/PLV  # MONITOR FOR EMESIS  # F/U CK MB, F/U CK

## 2021-12-24 NOTE — CHART NOTE - NSCHARTNOTEFT_GEN_A_CORE
EVENT: Pt alert, oriented refusing admission to telemetry despite importance explained.  Vital Signs Last 24 Hrs  T(C): 37.4 (23 Dec 2021 23:32), Max: 37.7 (23 Dec 2021 19:59)  T(F): 99.3 (23 Dec 2021 23:32), Max: 99.8 (23 Dec 2021 19:59)  HR: 99 (23 Dec 2021 23:32) (99 - 130)  BP: 144/73 (23 Dec 2021 23:32) (144/73 - 213/101)  BP(mean): --  RR: 20 (23 Dec 2021 23:32) (18 - 26)  SpO2: 96% (23 Dec 2021 23:32) (91% - 100%)    PLAN;  1. Reinstate order for medicine admission  2. Trend troponin

## 2021-12-24 NOTE — PROGRESS NOTE ADULT - SUBJECTIVE AND OBJECTIVE BOX
NP Note discussed with  Primary Attending    Patient is a 61y old  Male who presents with a chief complaint of Vomiting (24 Dec 2021 07:58)      INTERVAL HPI/OVERNIGHT EVENTS: no new complaints    MEDICATIONS  (STANDING):  amLODIPine   Tablet 10 milliGRAM(s) Oral daily  aspirin enteric coated 81 milliGRAM(s) Oral daily  cholecalciferol 1000 Unit(s) Oral daily  dexAMETHasone  Injectable 6 milliGRAM(s) IV Push daily  ferrous    sulfate 325 milliGRAM(s) Oral daily  folic acid 1 milliGRAM(s) Oral daily  heparin   Injectable 5000 Unit(s) SubCutaneous every 12 hours  hydrALAZINE 10 milliGRAM(s) Oral three times a day  insulin lispro (ADMELOG) corrective regimen sliding scale   SubCutaneous three times a day before meals  insulin lispro (ADMELOG) corrective regimen sliding scale   SubCutaneous at bedtime  metoclopramide 5 milliGRAM(s) Oral three times a day  nortriptyline 10 milliGRAM(s) Oral at bedtime  pantoprazole    Tablet 40 milliGRAM(s) Oral before breakfast  sevelamer carbonate 2400 milliGRAM(s) Oral three times a day with meals  simvastatin 40 milliGRAM(s) Oral at bedtime  sucralfate suspension 1 Gram(s) Oral four times a day    MEDICATIONS  (PRN):  ALBUTerol    90 MICROgram(s) HFA Inhaler 2 Puff(s) Inhalation every 4 hours PRN Shortness of Breath and/or Wheezing  benzonatate 100 milliGRAM(s) Oral three times a day PRN Cough  zolpidem 5 milliGRAM(s) Oral at bedtime PRN Insomnia      __________________________________________________  REVIEW OF SYSTEMS:    CONSTITUTIONAL: No fever,   EYES: no acute visual disturbances  NECK: No pain or stiffness  RESPIRATORY: No cough; No shortness of breath  CARDIOVASCULAR: No chest pain, no palpitations  GASTROINTESTINAL: No pain. No nausea or vomiting; No diarrhea   NEUROLOGICAL: No headache or numbness, no tremors  MUSCULOSKELETAL: No joint pain, no muscle pain  GENITOURINARY: no dysuria, no frequency, no hesitancy  PSYCHIATRY: no depression , no anxiety  ALL OTHER  ROS negative        Vital Signs Last 24 Hrs  T(C): 36.7 (24 Dec 2021 07:35), Max: 37.7 (23 Dec 2021 19:59)  T(F): 98 (24 Dec 2021 07:35), Max: 99.8 (23 Dec 2021 19:59)  HR: 95 (24 Dec 2021 07:35) (90 - 130)  BP: 165/102 (24 Dec 2021 07:35) (115/65 - 213/101)  BP(mean): --  RR: 18 (24 Dec 2021 07:35) (18 - 26)  SpO2: 97% (24 Dec 2021 07:35) (91% - 100%)    ________________________________________________  PHYSICAL EXAM:  GENERAL: NAD  HEENT: Normocephalic;  conjunctivae and sclerae clear; moist mucous membranes;   NECK : supple  CHEST/LUNG: Clear to auscultation bilaterally with good air entry   HEART: S1 S2  regular; no murmurs, gallops or rubs  ABDOMEN: Soft, Nontender, Nondistended; Bowel sounds present  EXTREMITIES: no cyanosis; no edema; no calf tenderness  SKIN: warm and dry; no rash  NERVOUS SYSTEM:  Awake and alert; Oriented  to place, person and time ; no new deficits    _________________________________________________  LABS:                        10.8   1.75  )-----------( 62       ( 23 Dec 2021 13:55 )             33.1     12-23    140  |  100  |  46<H>  ----------------------------<  107<H>  4.4   |  29  |  11.90<H>    Ca    8.2<L>      23 Dec 2021 13:55  Phos  5.0     12-23  Mg     2.9     12-23    TPro  6.7  /  Alb  3.1<L>  /  TBili  0.9  /  DBili  x   /  AST  26  /  ALT  16  /  AlkPhos  60  12-23    PT/INR - ( 23 Dec 2021 16:10 )   PT: 10.9 sec;   INR: 0.91 ratio         PTT - ( 23 Dec 2021 16:10 )  PTT:27.1 sec    CAPILLARY BLOOD GLUCOSE      POCT Blood Glucose.: 82 mg/dL (23 Dec 2021 15:52)        RADIOLOGY & ADDITIONAL TESTS:    Imaging Personally Reviewed:  YES/NO    Consultant(s) Notes Reviewed:   YES/ No    Care Discussed with Consultants :     Plan of care was discussed with patient and /or primary care giver; all questions and concerns were addressed and care was aligned with patient's wishes.     NP Note discussed with  Primary Attending    Patient is a 61y old  Male who presents with a chief complaint of Vomiting (24 Dec 2021 07:58)      INTERVAL HPI/OVERNIGHT EVENTS: no new complaints    MEDICATIONS  (STANDING):  amLODIPine   Tablet 10 milliGRAM(s) Oral daily  aspirin enteric coated 81 milliGRAM(s) Oral daily  cholecalciferol 1000 Unit(s) Oral daily  dexAMETHasone  Injectable 6 milliGRAM(s) IV Push daily  ferrous    sulfate 325 milliGRAM(s) Oral daily  folic acid 1 milliGRAM(s) Oral daily  heparin   Injectable 5000 Unit(s) SubCutaneous every 12 hours  hydrALAZINE 10 milliGRAM(s) Oral three times a day  insulin lispro (ADMELOG) corrective regimen sliding scale   SubCutaneous three times a day before meals  insulin lispro (ADMELOG) corrective regimen sliding scale   SubCutaneous at bedtime  metoclopramide 5 milliGRAM(s) Oral three times a day  nortriptyline 10 milliGRAM(s) Oral at bedtime  pantoprazole    Tablet 40 milliGRAM(s) Oral before breakfast  sevelamer carbonate 2400 milliGRAM(s) Oral three times a day with meals  simvastatin 40 milliGRAM(s) Oral at bedtime  sucralfate suspension 1 Gram(s) Oral four times a day    MEDICATIONS  (PRN):  ALBUTerol    90 MICROgram(s) HFA Inhaler 2 Puff(s) Inhalation every 4 hours PRN Shortness of Breath and/or Wheezing  benzonatate 100 milliGRAM(s) Oral three times a day PRN Cough  zolpidem 5 milliGRAM(s) Oral at bedtime PRN Insomnia      __________________________________________________  REVIEW OF SYSTEMS:    refusing physical exam and interview       Vital Signs Last 24 Hrs  T(C): 36.7 (24 Dec 2021 07:35), Max: 37.7 (23 Dec 2021 19:59)  T(F): 98 (24 Dec 2021 07:35), Max: 99.8 (23 Dec 2021 19:59)  HR: 95 (24 Dec 2021 07:35) (90 - 130)  BP: 165/102 (24 Dec 2021 07:35) (115/65 - 213/101)  BP(mean): --  RR: 18 (24 Dec 2021 07:35) (18 - 26)  SpO2: 97% (24 Dec 2021 07:35) (91% - 100%)    ________________________________________________  PHYSICAL EXAM:  unable due to refusal on supplemental oxygen 3L, no coughing or SOB noted   LABS:                        10.8   1.75  )-----------( 62       ( 23 Dec 2021 13:55 )             33.1     12-23    140  |  100  |  46<H>  ----------------------------<  107<H>  4.4   |  29  |  11.90<H>    Ca    8.2<L>      23 Dec 2021 13:55  Phos  5.0     12-23  Mg     2.9     12-23    TPro  6.7  /  Alb  3.1<L>  /  TBili  0.9  /  DBili  x   /  AST  26  /  ALT  16  /  AlkPhos  60  12-23    PT/INR - ( 23 Dec 2021 16:10 )   PT: 10.9 sec;   INR: 0.91 ratio         PTT - ( 23 Dec 2021 16:10 )  PTT:27.1 sec    CAPILLARY BLOOD GLUCOSE      POCT Blood Glucose.: 82 mg/dL (23 Dec 2021 15:52)        RADIOLOGY & ADDITIONAL TESTS:  < from: Xray Chest 1 View- PORTABLE-Urgent (Xray Chest 1 View- PORTABLE-Urgent .) (12.23.21 @ 13:30) >    ACC: [id number] EXAM:  XR CHEST PORTABLE URGENT 1V                          PROCEDURE DATE:  12/23/2021          INTERPRETATION:  CLINICAL STATEMENT: Chest Pain.    TECHNIQUE: AP view of the chest.    COMPARISON: 11/7/2021    FINDINGS/  IMPRESSION:  No consolidation or pleural effusion    Heart size cannot be accurately assessed in this projection.    Right axillary stent again noted    --- End of Report ---            ROSSI ARIAS MD; Attending Radiologist  This document has been electronically signed. Dec 23 2021  4:47PM    < end of copied text >    Imaging Personally Reviewed:  YES    Consultant(s) Notes Reviewed:   YES    Care Discussed with Consultants : cardiology/ GI     Plan of care was discussed with patient and /or primary care giver; all questions and concerns were addressed and care was aligned with patient's wishes.

## 2021-12-24 NOTE — PROGRESS NOTE ADULT - PROBLEM SELECTOR PLAN 9
came from [facility]   COVID positive  he may need to stay in the hospital for quarantine

## 2021-12-24 NOTE — PROGRESS NOTE ADULT - PROBLEM SELECTOR PLAN 5
Continue home meds  continue HD  last HD on wednesday, he says because of closure of center on weekend for [facility]  Nephro consult: Dr. Mosher Continue home meds  continue HD  last HD on Wednesday, he says because of closure of center on weekend for [facility]  Nephro consult: Dr. Mosher

## 2021-12-24 NOTE — PROGRESS NOTE ADULT - ASSESSMENT
61M w/ PMHx of DM, HTN, GERD, CAD, ESRD on hemodialysis, DM retinopath, Left leg amputation from [facility] assisted living,  presents to the ED with vomiting. Pt with episode of HTN emergency in ED and found to be Covid +. 61M w/ PMHx of DM, HTN, GERD, CAD, ESRD on hemodialysis, DM retinopath, Left leg amputation from [facility] assisted living,  presents to the ED with vomiting. Pt with episode of HTN emergency in ED and found to be Covid +.  Pt recently admitted to [facility] with similar episode, hematemesis likely due to esophagitis, however, due to refusal of EGD, CT A/P was obtained alternatively. he d/christy on PPI and carafate suspension and now presented with vomiting and + for COVID. currently he is on supplemental Oxygen 3L/min via N-C, saturating at 97%, no SOB stayed in his room for few minutes, no cough was noted during stay. Will titrate down O2 to RA.

## 2021-12-24 NOTE — ED ADULT NURSE REASSESSMENT NOTE - NS ED NURSE REASSESS COMMENT FT1
pt alert and oriented x3. Refused 6 am blood works despite encouragement. NP fabiano paged and aware.

## 2021-12-25 LAB
GLUCOSE BLDC GLUCOMTR-MCNC: 172 MG/DL — HIGH (ref 70–99)
GLUCOSE BLDC GLUCOMTR-MCNC: 230 MG/DL — HIGH (ref 70–99)
GLUCOSE BLDC GLUCOMTR-MCNC: 239 MG/DL — HIGH (ref 70–99)
GLUCOSE BLDC GLUCOMTR-MCNC: 285 MG/DL — HIGH (ref 70–99)
HBV SURFACE AG SER-ACNC: SIGNIFICANT CHANGE UP

## 2021-12-25 PROCEDURE — 99233 SBSQ HOSP IP/OBS HIGH 50: CPT

## 2021-12-25 RX ORDER — CHLORHEXIDINE GLUCONATE 213 G/1000ML
1 SOLUTION TOPICAL DAILY
Refills: 0 | Status: DISCONTINUED | OUTPATIENT
Start: 2021-12-25 | End: 2021-12-29

## 2021-12-25 RX ADMIN — AMLODIPINE BESYLATE 10 MILLIGRAM(S): 2.5 TABLET ORAL at 05:42

## 2021-12-25 RX ADMIN — Medication 1 GRAM(S): at 05:43

## 2021-12-25 RX ADMIN — Medication 1 GRAM(S): at 17:03

## 2021-12-25 RX ADMIN — Medication 5 MILLIGRAM(S): at 05:42

## 2021-12-25 RX ADMIN — Medication 5 MILLIGRAM(S): at 22:23

## 2021-12-25 RX ADMIN — Medication 6 MILLIGRAM(S): at 05:43

## 2021-12-25 RX ADMIN — Medication 1 MILLIGRAM(S): at 12:44

## 2021-12-25 RX ADMIN — Medication 1 GRAM(S): at 12:45

## 2021-12-25 RX ADMIN — Medication 5 MILLIGRAM(S): at 13:03

## 2021-12-25 RX ADMIN — Medication 3: at 12:44

## 2021-12-25 RX ADMIN — Medication 325 MILLIGRAM(S): at 12:44

## 2021-12-25 RX ADMIN — CHLORHEXIDINE GLUCONATE 1 APPLICATION(S): 213 SOLUTION TOPICAL at 12:45

## 2021-12-25 RX ADMIN — Medication 2: at 17:03

## 2021-12-25 RX ADMIN — Medication 1000 UNIT(S): at 12:44

## 2021-12-25 RX ADMIN — Medication 10 MILLIGRAM(S): at 22:23

## 2021-12-25 RX ADMIN — HEPARIN SODIUM 5000 UNIT(S): 5000 INJECTION INTRAVENOUS; SUBCUTANEOUS at 17:03

## 2021-12-25 RX ADMIN — SEVELAMER CARBONATE 2400 MILLIGRAM(S): 2400 POWDER, FOR SUSPENSION ORAL at 17:03

## 2021-12-25 RX ADMIN — PANTOPRAZOLE SODIUM 40 MILLIGRAM(S): 20 TABLET, DELAYED RELEASE ORAL at 05:42

## 2021-12-25 RX ADMIN — HEPARIN SODIUM 5000 UNIT(S): 5000 INJECTION INTRAVENOUS; SUBCUTANEOUS at 05:41

## 2021-12-25 RX ADMIN — NORTRIPTYLINE HYDROCHLORIDE 10 MILLIGRAM(S): 10 CAPSULE ORAL at 22:23

## 2021-12-25 RX ADMIN — Medication 10 MILLIGRAM(S): at 05:41

## 2021-12-25 RX ADMIN — Medication 10 MILLIGRAM(S): at 13:03

## 2021-12-25 RX ADMIN — SIMVASTATIN 40 MILLIGRAM(S): 20 TABLET, FILM COATED ORAL at 22:23

## 2021-12-25 RX ADMIN — SEVELAMER CARBONATE 2400 MILLIGRAM(S): 2400 POWDER, FOR SUSPENSION ORAL at 12:45

## 2021-12-25 RX ADMIN — Medication 1 GRAM(S): at 01:27

## 2021-12-25 RX ADMIN — Medication 81 MILLIGRAM(S): at 12:44

## 2021-12-25 NOTE — PROGRESS NOTE ADULT - ATTENDING COMMENTS
Patient is now alert, ambulatory.  No vomiting.   # PANCYTOPENIA - F/U JAMEEL, HIV, RPR, ANEMIA PANEL  # HX OF ANEMIA OF CKD  - IF PERSISTS, CONSULT HEME/ONC CONSULT    # SEVERE PROTEIN CALORIE MALNUTRITION, FAILURE TO THRIVE - NUTRITIONAL SUPPLEMENT  # ANEMIA OF CKD  # HLD  # ESRD ON HD TTS - W/ HX OF NONCOMPLIANCE - NEPHROLOGY CONSULT IN PROGRESS  # HTN  # DM   # PARTIALLY BLIND  # S/P LEFT BKA  # HX OF PVD  Cardiology and Nephrology f/u, appreciated.   Will control BP.  Dialysis on Monday.   HIV, HCV, JAMEEL, RPR, if patient agrees to blood draw.

## 2021-12-25 NOTE — PROGRESS NOTE ADULT - SUBJECTIVE AND OBJECTIVE BOX
Patient is a 61y Male with  ESRD ON  HD   HAD  HD  YESTERDAY,  TOLERATED IT WELL   FEELS FINE AT THE TIME OF EXAM    fish (Rash)  liver (Anaphylaxis)  No Known Drug Allergies    Hospital Medications:   MEDICATIONS  (STANDING):  amLODIPine   Tablet 10 milliGRAM(s) Oral daily  aspirin enteric coated 81 milliGRAM(s) Oral daily  chlorhexidine 2% Cloths 1 Application(s) Topical daily  cholecalciferol 1000 Unit(s) Oral daily  dexAMETHasone  Injectable 6 milliGRAM(s) IV Push daily  epoetin beverley-epbx (RETACRIT) Injectable 4000 Unit(s) IV Push <User Schedule>  ferrous    sulfate 325 milliGRAM(s) Oral daily  folic acid 1 milliGRAM(s) Oral daily  heparin   Injectable 5000 Unit(s) SubCutaneous every 12 hours  hydrALAZINE 10 milliGRAM(s) Oral three times a day  insulin lispro (ADMELOG) corrective regimen sliding scale   SubCutaneous three times a day before meals  insulin lispro (ADMELOG) corrective regimen sliding scale   SubCutaneous at bedtime  metoclopramide 5 milliGRAM(s) Oral three times a day  nortriptyline 10 milliGRAM(s) Oral at bedtime  pantoprazole    Tablet 40 milliGRAM(s) Oral before breakfast  sevelamer carbonate 2400 milliGRAM(s) Oral three times a day with meals  simvastatin 40 milliGRAM(s) Oral at bedtime  sucralfate suspension 1 Gram(s) Oral four times a day    REVIEW OF SYSTEMS:  HAS NO   FEVER  CHILLS   NO   SOB  OR  COUGH   NO CH  PAIN  / PALPITATIONS   APPETITE IS GOOD, NO  N/V  OR  ABD  PAIN  EATING LUNCH     NO LEG  SWELLING    VITALS:  T(F): 99 (12-25-21 @ 13:20), Max: 99.2 (12-24-21 @ 14:32)  HR: 84 (12-25-21 @ 13:20)  BP: 136/66 (12-25-21 @ 13:20)  RR: 17 (12-25-21 @ 13:20)  SpO2: 93% (12-25-21 @ 13:20)  Wt(kg): --    12-24 @ 07:01  -  12-25 @ 07:00  --------------------------------------------------------  IN: 600 mL / OUT: 1675 mL / NET: -1075 mL        PHYSICAL EXAM:  Constitutional: NAD  HEENT: CONJ  PINK  Neck: No JVD  Respiratory: CTAB, no wheezes, rales or rhonchi  Cardiovascular: S1, S2, RRR  Gastrointestinal: BS+, soft, NT/ND  Extremities:  No peripheral edema R     L  BKA  Neurological: A/O x 3,   :  No [place].     Vascular Access: L UPPER ARM  AVF,  WORKING WELL    LABS:  12-24    136  |  99  |  62<H>  ----------------------------<  211<H>  5.1   |  26  |  14.20<H>    Ca    7.4<L>      24 Dec 2021 18:05  Phos  5.0     12-23  Mg     2.9     12-23    TPro  6.1  /  Alb  2.7<L>  /  TBili  0.7  /  DBili      /  AST  22  /  ALT  19  /  AlkPhos  56  12-24    Creatinine Trend: 14.20 <--, 11.90 <--                        9.8    2.30  )-----------( 62       ( 24 Dec 2021 18:05 )             30.4     Urine Studies:      RADIOLOGY & ADDITIONAL STUDIES:

## 2021-12-25 NOTE — PROGRESS NOTE ADULT - ASSESSMENT
61M w/ PMHx of DM, HTN, GERD, CAD, ESRD on hemodialysis, DM retinopath, Left leg amputation from [facility] assisted living,  presents to the ED with vomiting. Pt with episode of HTN emergency in ED and found to be Covid +.

## 2021-12-25 NOTE — PROGRESS NOTE ADULT - ASSESSMENT
A/P  ESRD ON  HD    SCHEDULED  FOR HD ON  MONDAY    WAS  DIALYZED HERE YESTERDAY    HAS  NO  VOL  EXCESS   BP IS  BETTER CONTROLLED    ADV AGAIN TO TAKE HIS MEDS ON  TIME     IS  BEING MANAGED FOR COVID 19  INFECTION

## 2021-12-25 NOTE — PROGRESS NOTE ADULT - SUBJECTIVE AND OBJECTIVE BOX
Cardiology     HISTORY OF PRESENT ILLNESS: HPI:  61M w/ PMHx of DM, HTN, GERD, CAD, ESRD on hemodialysis (T,Th,S), DM retinopath, Left leg amputation from [facility] assisted living,  presents to the ED with vomiting. Pt states that he has been vomiting for the last 3 days around 3 times a day, after meals. Denies any hematemesis, abdominal pain, diarrhea or constipation. He also endorses cough and mild SOB. he states that he hasn't eaten his medications because of the vomiting. In the ED patient had episode of hypertensive emergency and SOB, he was placed on bipap temporarily, given bp meds, and fluids with improvement of oxygenation and BP. currently pt comfortable on NC. Patient tested + for covid in ED, as per nursing home records, pt only vaccinated x 1 in Jan 2021.  (23 Dec 2021 21:46)    Patient refusing meds at [facility], and refusing medication here.  Able to lie flat.  States he still feels feverish and is upset about missing Vinny, and misses his girlfriend.    No angina, no palpitations, no lightheadedness, no vision changes or headaches, no fainting. A 10 pt ROS is otherwise negative.    12/25- sleeping soundly, position reversed in bed, refuses interview.  no overnight events reported.    ALBUTerol    90 MICROgram(s) HFA Inhaler 2 Puff(s) Inhalation every 4 hours PRN  amLODIPine   Tablet 10 milliGRAM(s) Oral daily  aspirin enteric coated 81 milliGRAM(s) Oral daily  benzonatate 100 milliGRAM(s) Oral three times a day PRN  chlorhexidine 2% Cloths 1 Application(s) Topical daily  cholecalciferol 1000 Unit(s) Oral daily  dexAMETHasone  Injectable 6 milliGRAM(s) IV Push daily  epoetin beverley-epbx (RETACRIT) Injectable 4000 Unit(s) IV Push <User Schedule>  ferrous    sulfate 325 milliGRAM(s) Oral daily  folic acid 1 milliGRAM(s) Oral daily  heparin   Injectable 5000 Unit(s) SubCutaneous every 12 hours  hydrALAZINE 10 milliGRAM(s) Oral three times a day  insulin lispro (ADMELOG) corrective regimen sliding scale   SubCutaneous three times a day before meals  insulin lispro (ADMELOG) corrective regimen sliding scale   SubCutaneous at bedtime  metoclopramide 5 milliGRAM(s) Oral three times a day  nortriptyline 10 milliGRAM(s) Oral at bedtime  pantoprazole    Tablet 40 milliGRAM(s) Oral before breakfast  sevelamer carbonate 2400 milliGRAM(s) Oral three times a day with meals  simvastatin 40 milliGRAM(s) Oral at bedtime  sucralfate suspension 1 Gram(s) Oral four times a day  zolpidem 5 milliGRAM(s) Oral at bedtime PRN                            9.8    2.30  )-----------( 62       ( 24 Dec 2021 18:05 )             30.4       12-24    136  |  99  |  62<H>  ----------------------------<  211<H>  5.1   |  26  |  14.20<H>    Ca    7.4<L>      24 Dec 2021 18:05  Phos  5.0     12-23  Mg     2.9     12-23    TPro  6.1  /  Alb  2.7<L>  /  TBili  0.7  /  DBili  x   /  AST  22  /  ALT  19  /  AlkPhos  56  12-24      T(C): 37.3 (12-25-21 @ 05:25), Max: 37.3 (12-24-21 @ 14:32)  HR: 96 (12-25-21 @ 05:25) (92 - 96)  BP: 147/76 (12-25-21 @ 05:25) (126/77 - 152/92)  RR: 18 (12-25-21 @ 05:25) (16 - 18)  SpO2: 97% (12-25-21 @ 05:25) (97% - 100%)  Wt(kg): --    I&O's Summary    24 Dec 2021 07:01  -  25 Dec 2021 07:00  --------------------------------------------------------  IN: 600 mL / OUT: 1675 mL / NET: -1075 mL    Appearance: Normal appearing small frame adult male, in no acute distress	  HEENT:   Normal oral mucosa, PERRL, EOMI	  Lymphatic: No lymphadenopathy , no edema  Cardiovascular: Normal S1 S2, No JVD, No murmurs , Peripheral pulses palpable 2+ bilaterally, right shoulder fistula with good thrill + bruit.  Respiratory: Lungs clear to auscultation, normal effort 	  Gastrointestinal:  Soft, Non-tender, + BS	  Skin: No rashes, No ecchymoses, No cyanosis, hot to touch/ febrile.  Musculoskeletal: Normal range of motion, normal strength  Psychiatry:  Mood & affect appropriate    TELEMETRY: not on / refused	    ECG: NSR, LVH.  Echo: in 2017, EF normal / no significant LVH at that time.    ASSESSMENT/PLAN: 	61y Male presenting with vomitng. Found to have COVID, fever, and hypertensive to the 200s.  Recommend medical management for emesis, fevers (oral tylenol).  Workup for pancytopenia.  (Due to COVID or is this another factor?)  COVID treatment per internal med +/- ID, as he is on a low dose of oxygen at this time.  Consider repeat dialysis, and insist upon timely dosing of oral antihypertensives.  No 'emergency' signs at this time.  BP under much better control on hospital day #2  Recommend obtaining full cardiac enzymes including CPK and CKMB.  Troponin is elevated but only mildly so, and I do not believe he is having a NSTEMI at this time.  Not recommending heparin infusion.  Will follow.    Darnell Gonzales M.D.  Cardiac Electrophysiology    office [phone]  pager [phone]

## 2021-12-25 NOTE — PROGRESS NOTE ADULT - PROBLEM SELECTOR PLAN 1
Pt with episode of HTN emergency in the ED, developed SOB  s/p Nitroglycerin, amlodipine and hydralazine with improvement in bp  temporary period of bipap, now in room air saturating well  Xray clear, no pulmonary edema, f/u official read  Troponin 188, likely in setting of demand ischemia  EKG: Sinus tachy, non specific T wave changes  f/u T2  tele monitoring  Cardio f/u, seen and appreciated.

## 2021-12-25 NOTE — PROGRESS NOTE ADULT - PROBLEM SELECTOR PLAN 3
Pt tested + for covid in ed  xray clear, f/u official read  s/p vaccine x 1 in Jan 2021 as per NH chart  on NC 3L  will continue decadron, no remdesivir due to ESRD?  albuterol, tylenol, tessalon pearle prn  monitor O2 status  isolation precautions  ID f/u: Dr. Newby

## 2021-12-25 NOTE — PROGRESS NOTE ADULT - SUBJECTIVE AND OBJECTIVE BOX
MEDICAL ATTENDING NOTE  Patient is a 61y old  Male who presents with a chief complaint of Vomiting (25 Dec 2021 13:29)      HPI:  61M w/ PMHx of DM, HTN, GERD, CAD, ESRD on hemodialysis (T,Th,S), DM retinopath, Left leg amputation from [facility] assisted living,  presents to the ED with vomiting. Pt states that he has been vomiting for the last 3 days around 3 times a day, after meals. Denies any hematemesis, abdominal pain, diarrhea or constipation. He also endorses cough and mild SOB. he states that he hasn't eaten his medications because of the vomiting. In the ED patient had episode of hypertensive emergency and SOB, he was placed on bipap temporarily, given bp meds, and fluids with improvement of oxygenation and BP. currently pt comfortable on RA.  Patient tested + for covid in ED, as per nursing home records, pt only vaccinated x 1 in Jan 2021.        INTERVAL HPI/OVERNIGHT EVENTS: no new complaints    MEDICATIONS  (STANDING):  amLODIPine   Tablet 10 milliGRAM(s) Oral daily  aspirin enteric coated 81 milliGRAM(s) Oral daily  chlorhexidine 2% Cloths 1 Application(s) Topical daily  cholecalciferol 1000 Unit(s) Oral daily  dexAMETHasone  Injectable 6 milliGRAM(s) IV Push daily  epoetin beverley-epbx (RETACRIT) Injectable 4000 Unit(s) IV Push <User Schedule>  ferrous    sulfate 325 milliGRAM(s) Oral daily  folic acid 1 milliGRAM(s) Oral daily  heparin   Injectable 5000 Unit(s) SubCutaneous every 12 hours  hydrALAZINE 10 milliGRAM(s) Oral three times a day  insulin lispro (ADMELOG) corrective regimen sliding scale   SubCutaneous three times a day before meals  insulin lispro (ADMELOG) corrective regimen sliding scale   SubCutaneous at bedtime  metoclopramide 5 milliGRAM(s) Oral three times a day  nortriptyline 10 milliGRAM(s) Oral at bedtime  pantoprazole    Tablet 40 milliGRAM(s) Oral before breakfast  sevelamer carbonate 2400 milliGRAM(s) Oral three times a day with meals  simvastatin 40 milliGRAM(s) Oral at bedtime  sucralfate suspension 1 Gram(s) Oral four times a day    MEDICATIONS  (PRN):  ALBUTerol    90 MICROgram(s) HFA Inhaler 2 Puff(s) Inhalation every 4 hours PRN Shortness of Breath and/or Wheezing  benzonatate 100 milliGRAM(s) Oral three times a day PRN Cough  zolpidem 5 milliGRAM(s) Oral at bedtime PRN Insomnia      __________________________________________________  REVIEW OF SYSTEMS:    CONSTITUTIONAL: No fever,   EYES: no acute visual disturbances  NECK: No pain or stiffness  RESPIRATORY: No cough; No shortness of breath  CARDIOVASCULAR: No chest pain, no palpitations  GASTROINTESTINAL: No pain. No nausea or vomiting; No diarrhea; patient c/o feeling hungry  NEUROLOGICAL: No headache or numbness, no tremors  MUSCULOSKELETAL: No joint pain, no muscle pain  GENITOURINARY: no dysuria, no frequency, no hesitancy  PSYCHIATRY: no depression , no anxiety  ALL OTHER  ROS negative        Vital Signs Last 24 Hrs  T(C): 37.2 (25 Dec 2021 13:20), Max: 37.3 (25 Dec 2021 05:25)  T(F): 99 (25 Dec 2021 13:20), Max: 99.2 (25 Dec 2021 05:25)  HR: 84 (25 Dec 2021 13:20) (84 - 96)  BP: 136/66 (25 Dec 2021 13:20) (136/66 - 152/92)  BP(mean): --  RR: 17 (25 Dec 2021 13:20) (17 - 18)  SpO2: 93% (25 Dec 2021 13:20) (93% - 100%)    ________________________________________________  PHYSICAL EXAM:  GENERAL: NAD, chronically-ill, ambulatory  HEENT: Normocephalic;  conjunctivae and sclerae clear; moist mucous membranes;   NECK : supple  CHEST/LUNG: Clear to auscultation bilaterally with good air entry   HEART: S1 S2  regular; no murmurs, gallops or rubs  ABDOMEN: Soft, Nontender, Nondistended; Bowel sounds present  EXTREMITIES: no cyanosis; no edema; no calf tenderness  SKIN: warm and dry; no rash  NERVOUS SYSTEM:  Awake and alert; Oriented  to place, person and time ; no new deficits    _________________________________________________  LABS:                        9.8    2.30  )-----------( 62       ( 24 Dec 2021 18:05 )             30.4     12-24    136  |  99  |  62<H>  ----------------------------<  211<H>  5.1   |  26  |  14.20<H>    Ca    7.4<L>      24 Dec 2021 18:05    TPro  6.1  /  Alb  2.7<L>  /  TBili  0.7  /  DBili  x   /  AST  22  /  ALT  19  /  AlkPhos  56  12-24        CAPILLARY BLOOD GLUCOSE      POCT Blood Glucose.: 239 mg/dL (25 Dec 2021 16:15)  POCT Blood Glucose.: 285 mg/dL (25 Dec 2021 11:50)  POCT Blood Glucose.: 172 mg/dL (25 Dec 2021 08:58)  POCT Blood Glucose.: 177 mg/dL (24 Dec 2021 21:27)

## 2021-12-25 NOTE — PROGRESS NOTE ADULT - PROBLEM SELECTOR PLAN 2
Pt with vomiting for some time  last admission with hematemesis, denied EGD at that time, CT abdomen wnl  Continue reglan, ppi, zofran, carafate  Liquid diet  gentle hydration (ESRD)  < from: CT Abdomen and Pelvis No Cont (11.09.21 @ 16:42) >    IMPRESSION: No evidence of acute inflammatory or obstructive process in the abdomen and pelvis. Diffusely thickened and mildly patulous esophagus, similar to November 2020. Bilateral lower lobe dependent atelectasis.    < end of copied text >    Clinically improved.  Patient is asking for food.

## 2021-12-25 NOTE — PROGRESS NOTE ADULT - PROBLEM SELECTOR PLAN 4
Pt with pancytopenia  anc: 1400  hgb 10 (hx of hematamesis, denies currently, denies melena)  ptl : 62  unclear etiology, covid? ESRD?   Monitor cbc  f/u hiv, [place], rpr, hcv  consider heme consult?

## 2021-12-26 LAB
ANION GAP SERPL CALC-SCNC: 11 MMOL/L — SIGNIFICANT CHANGE UP (ref 5–17)
BUN SERPL-MCNC: 53 MG/DL — HIGH (ref 7–18)
CALCIUM SERPL-MCNC: 8 MG/DL — LOW (ref 8.4–10.5)
CHLORIDE SERPL-SCNC: 99 MMOL/L — SIGNIFICANT CHANGE UP (ref 96–108)
CO2 SERPL-SCNC: 27 MMOL/L — SIGNIFICANT CHANGE UP (ref 22–31)
CREAT SERPL-MCNC: 11.6 MG/DL — HIGH (ref 0.5–1.3)
GLUCOSE BLDC GLUCOMTR-MCNC: 148 MG/DL — HIGH (ref 70–99)
GLUCOSE BLDC GLUCOMTR-MCNC: 167 MG/DL — HIGH (ref 70–99)
GLUCOSE BLDC GLUCOMTR-MCNC: 225 MG/DL — HIGH (ref 70–99)
GLUCOSE BLDC GLUCOMTR-MCNC: 402 MG/DL — HIGH (ref 70–99)
GLUCOSE SERPL-MCNC: 160 MG/DL — HIGH (ref 70–99)
HCT VFR BLD CALC: 34.1 % — LOW (ref 39–50)
HGB BLD-MCNC: 11.2 G/DL — LOW (ref 13–17)
MAGNESIUM SERPL-MCNC: 3.1 MG/DL — HIGH (ref 1.6–2.6)
MCHC RBC-ENTMCNC: 28.4 PG — SIGNIFICANT CHANGE UP (ref 27–34)
MCHC RBC-ENTMCNC: 32.8 GM/DL — SIGNIFICANT CHANGE UP (ref 32–36)
MCV RBC AUTO: 86.5 FL — SIGNIFICANT CHANGE UP (ref 80–100)
NRBC # BLD: 0 /100 WBCS — SIGNIFICANT CHANGE UP (ref 0–0)
PHOSPHATE SERPL-MCNC: 3.4 MG/DL — SIGNIFICANT CHANGE UP (ref 2.5–4.5)
PLATELET # BLD AUTO: 77 K/UL — LOW (ref 150–400)
POTASSIUM SERPL-MCNC: 4.4 MMOL/L — SIGNIFICANT CHANGE UP (ref 3.5–5.3)
POTASSIUM SERPL-SCNC: 4.4 MMOL/L — SIGNIFICANT CHANGE UP (ref 3.5–5.3)
RBC # BLD: 3.94 M/UL — LOW (ref 4.2–5.8)
RBC # FLD: 19.4 % — HIGH (ref 10.3–14.5)
SARS-COV-2 RNA SPEC QL NAA+PROBE: DETECTED
SODIUM SERPL-SCNC: 137 MMOL/L — SIGNIFICANT CHANGE UP (ref 135–145)
WBC # BLD: 2.25 K/UL — LOW (ref 3.8–10.5)
WBC # FLD AUTO: 2.25 K/UL — LOW (ref 3.8–10.5)

## 2021-12-26 PROCEDURE — 74176 CT ABD & PELVIS W/O CONTRAST: CPT | Mod: 26

## 2021-12-26 PROCEDURE — 99233 SBSQ HOSP IP/OBS HIGH 50: CPT

## 2021-12-26 PROCEDURE — 71250 CT THORAX DX C-: CPT | Mod: 26

## 2021-12-26 RX ORDER — ACETAMINOPHEN 500 MG
1000 TABLET ORAL ONCE
Refills: 0 | Status: COMPLETED | OUTPATIENT
Start: 2021-12-26 | End: 2021-12-26

## 2021-12-26 RX ADMIN — SEVELAMER CARBONATE 2400 MILLIGRAM(S): 2400 POWDER, FOR SUSPENSION ORAL at 08:06

## 2021-12-26 RX ADMIN — Medication 1 GRAM(S): at 23:07

## 2021-12-26 RX ADMIN — Medication 1 GRAM(S): at 00:45

## 2021-12-26 RX ADMIN — Medication 81 MILLIGRAM(S): at 11:49

## 2021-12-26 RX ADMIN — Medication 1 GRAM(S): at 06:34

## 2021-12-26 RX ADMIN — Medication 325 MILLIGRAM(S): at 11:49

## 2021-12-26 RX ADMIN — Medication 1000 MILLIGRAM(S): at 23:50

## 2021-12-26 RX ADMIN — Medication 10 MILLIGRAM(S): at 13:07

## 2021-12-26 RX ADMIN — AMLODIPINE BESYLATE 10 MILLIGRAM(S): 2.5 TABLET ORAL at 06:33

## 2021-12-26 RX ADMIN — SEVELAMER CARBONATE 2400 MILLIGRAM(S): 2400 POWDER, FOR SUSPENSION ORAL at 11:49

## 2021-12-26 RX ADMIN — Medication 1 GRAM(S): at 17:11

## 2021-12-26 RX ADMIN — Medication 400 MILLIGRAM(S): at 23:07

## 2021-12-26 RX ADMIN — NORTRIPTYLINE HYDROCHLORIDE 10 MILLIGRAM(S): 10 CAPSULE ORAL at 22:13

## 2021-12-26 RX ADMIN — SIMVASTATIN 40 MILLIGRAM(S): 20 TABLET, FILM COATED ORAL at 22:13

## 2021-12-26 RX ADMIN — Medication 1: at 08:05

## 2021-12-26 RX ADMIN — Medication 5 MILLIGRAM(S): at 13:07

## 2021-12-26 RX ADMIN — CHLORHEXIDINE GLUCONATE 1 APPLICATION(S): 213 SOLUTION TOPICAL at 11:48

## 2021-12-26 RX ADMIN — SEVELAMER CARBONATE 2400 MILLIGRAM(S): 2400 POWDER, FOR SUSPENSION ORAL at 17:11

## 2021-12-26 RX ADMIN — Medication 10 MILLIGRAM(S): at 06:33

## 2021-12-26 RX ADMIN — PANTOPRAZOLE SODIUM 40 MILLIGRAM(S): 20 TABLET, DELAYED RELEASE ORAL at 06:33

## 2021-12-26 RX ADMIN — Medication 10 MILLIGRAM(S): at 22:13

## 2021-12-26 RX ADMIN — Medication 1 MILLIGRAM(S): at 11:48

## 2021-12-26 RX ADMIN — Medication 6: at 17:11

## 2021-12-26 RX ADMIN — Medication 6 MILLIGRAM(S): at 06:33

## 2021-12-26 RX ADMIN — Medication 1000 UNIT(S): at 11:49

## 2021-12-26 RX ADMIN — Medication 1 GRAM(S): at 11:49

## 2021-12-26 RX ADMIN — Medication 5 MILLIGRAM(S): at 06:33

## 2021-12-26 RX ADMIN — ZOLPIDEM TARTRATE 5 MILLIGRAM(S): 10 TABLET ORAL at 23:07

## 2021-12-26 RX ADMIN — Medication 5 MILLIGRAM(S): at 22:13

## 2021-12-26 NOTE — PROGRESS NOTE ADULT - ASSESSMENT
61M w/ PMHx of DM, HTN, GERD, CAD, ESRD on hemodialysis, DM retinopath, Left leg amputation from [facility] assisted living,  presents to the ED with vomiting. Pt with episode of HTN emergency in ED and found to be Covid +.

## 2021-12-26 NOTE — PROGRESS NOTE ADULT - SUBJECTIVE AND OBJECTIVE BOX
MEDICAL ATTENDING NOTE  Patient is a 61y old  Male who presents with a chief complaint of Vomiting (25 Dec 2021 13:29)      HPI:  61M w/ PMHx of DM, HTN, GERD, CAD, ESRD on hemodialysis (T,Th,S), DM retinopath, Left leg amputation from [facility] assisted living,  presents to the ED with vomiting. Pt states that he has been vomiting for the last 3 days around 3 times a day, after meals. Denies any hematemesis, abdominal pain, diarrhea or constipation. He also endorses cough and mild SOB. he states that he hasn't eaten his medications because of the vomiting. In the ED patient had episode of hypertensive emergency and SOB, he was placed on bipap temporarily, given bp meds, and fluids with improvement of oxygenation and BP. currently pt comfortable on RA.  Patient tested + for covid in ED, as per nursing home records, pt only vaccinated x 1 in Jan 2021.        INTERVAL HPI/OVERNIGHT EVENTS: no new complaints, eager to return to [facility]    MEDICATIONS  (STANDING):  amLODIPine   Tablet 10 milliGRAM(s) Oral daily  aspirin enteric coated 81 milliGRAM(s) Oral daily  chlorhexidine 2% Cloths 1 Application(s) Topical daily  cholecalciferol 1000 Unit(s) Oral daily  dexAMETHasone  Injectable 6 milliGRAM(s) IV Push daily  epoetin beverley-epbx (RETACRIT) Injectable 4000 Unit(s) IV Push <User Schedule>  ferrous    sulfate 325 milliGRAM(s) Oral daily  folic acid 1 milliGRAM(s) Oral daily  heparin   Injectable 5000 Unit(s) SubCutaneous every 12 hours  hydrALAZINE 10 milliGRAM(s) Oral three times a day  insulin lispro (ADMELOG) corrective regimen sliding scale   SubCutaneous three times a day before meals  insulin lispro (ADMELOG) corrective regimen sliding scale   SubCutaneous at bedtime  metoclopramide 5 milliGRAM(s) Oral three times a day  nortriptyline 10 milliGRAM(s) Oral at bedtime  pantoprazole    Tablet 40 milliGRAM(s) Oral before breakfast  sevelamer carbonate 2400 milliGRAM(s) Oral three times a day with meals  simvastatin 40 milliGRAM(s) Oral at bedtime  sucralfate suspension 1 Gram(s) Oral four times a day    MEDICATIONS  (PRN):  ALBUTerol    90 MICROgram(s) HFA Inhaler 2 Puff(s) Inhalation every 4 hours PRN Shortness of Breath and/or Wheezing  benzonatate 100 milliGRAM(s) Oral three times a day PRN Cough  zolpidem 5 milliGRAM(s) Oral at bedtime PRN Insomnia      __________________________________________________  REVIEW OF SYSTEMS:    CONSTITUTIONAL: No fever,   EYES: no acute visual disturbances  NECK: No pain or stiffness  RESPIRATORY: No cough; No shortness of breath  CARDIOVASCULAR: No chest pain, no palpitations  GASTROINTESTINAL: No pain. No nausea or vomiting; No diarrhea; patient c/o feeling hungry  NEUROLOGICAL: No headache or numbness, no tremors  MUSCULOSKELETAL: No joint pain, no muscle pain  GENITOURINARY: no dysuria, no frequency, no hesitancy  PSYCHIATRY: no depression , no anxiety  ALL OTHER  ROS negative        Vital Signs Last 24 Hrs  T(C): 36.9 (26 Dec 2021 13:42), Max: 36.9 (26 Dec 2021 13:42)  T(F): 98.4 (26 Dec 2021 13:42), Max: 98.4 (26 Dec 2021 13:42)  HR: 95 (26 Dec 2021 13:42) (83 - 95)  BP: 130/83 (26 Dec 2021 13:42) (130/73 - 130/83)  BP(mean): --  RR: 17 (26 Dec 2021 13:42) (17 - 18)  SpO2: 97% (26 Dec 2021 13:42) (97% - 100%)    ________________________________________________  PHYSICAL EXAM:  GENERAL: NAD, chronically-ill, ambulatory  HEENT: Normocephalic;  conjunctivae and sclerae clear; moist mucous membranes;   NECK : supple  CHEST/LUNG: Clear to auscultation bilaterally with good air entry   HEART: S1 S2  regular; no murmurs, gallops or rubs  ABDOMEN: Soft, Nontender, Nondistended; Bowel sounds present  EXTREMITIES: no cyanosis; no edema; no calf tenderness  SKIN: warm and dry; no rash  NERVOUS SYSTEM:  Awake and alert; Oriented  to place, person and time ; no new deficits    _________________________________________________  LABS:                                   11.2   2.25  )-----------( 77       ( 26 Dec 2021 07:28 )             34.1   12-26    137  |  99  |  53<H>  ----------------------------<  160<H>  4.4   |  27  |  11.60<H>    Ca    8.0<L>      26 Dec 2021 07:28  Phos  3.4     12-26  Mg     3.1     12-26    TPro  6.1  /  Alb  2.7<L>  /  TBili  0.7  /  DBili  x   /  AST  22  /  ALT  19  /  AlkPhos  56  12-24

## 2021-12-26 NOTE — PROGRESS NOTE ADULT - SUBJECTIVE AND OBJECTIVE BOX
Cardiology     HISTORY OF PRESENT ILLNESS: HPI:  61M w/ PMHx of DM, HTN, GERD, CAD, ESRD on hemodialysis (T,Th,S), DM retinopath, Left leg amputation from [facility] assisted living,  presents to the ED with vomiting. Pt states that he has been vomiting for the last 3 days around 3 times a day, after meals. Denies any hematemesis, abdominal pain, diarrhea or constipation. He also endorses cough and mild SOB. he states that he hasn't eaten his medications because of the vomiting. In the ED patient had episode of hypertensive emergency and SOB, he was placed on bipap temporarily, given bp meds, and fluids with improvement of oxygenation and BP. currently pt comfortable on NC. Patient tested + for covid in ED, as per nursing home records, pt only vaccinated x 1 in Jan 2021.  (23 Dec 2021 21:46)    Patient refusing meds at [facility], and refusing medication here.  Able to lie flat.  States he still feels feverish and is upset about missing Vinny, and misses his girlfriend.    No angina, no palpitations, no lightheadedness, no vision changes or headaches, no fainting. A 10 pt ROS is otherwise negative.    12/25- sleeping soundly, position reversed in bed, refuses interview.  no overnight events reported.  12/26 no issues overnight, no events reported by nursing         ALBUTerol    90 MICROgram(s) HFA Inhaler 2 Puff(s) Inhalation every 4 hours PRN  amLODIPine   Tablet 10 milliGRAM(s) Oral daily  aspirin enteric coated 81 milliGRAM(s) Oral daily  benzonatate 100 milliGRAM(s) Oral three times a day PRN  chlorhexidine 2% Cloths 1 Application(s) Topical daily  cholecalciferol 1000 Unit(s) Oral daily  dexAMETHasone  Injectable 6 milliGRAM(s) IV Push daily  epoetin beverley-epbx (RETACRIT) Injectable 4000 Unit(s) IV Push <User Schedule>  ferrous    sulfate 325 milliGRAM(s) Oral daily  folic acid 1 milliGRAM(s) Oral daily  heparin   Injectable 5000 Unit(s) SubCutaneous every 12 hours  hydrALAZINE 10 milliGRAM(s) Oral three times a day  insulin lispro (ADMELOG) corrective regimen sliding scale   SubCutaneous three times a day before meals  insulin lispro (ADMELOG) corrective regimen sliding scale   SubCutaneous at bedtime  metoclopramide 5 milliGRAM(s) Oral three times a day  nortriptyline 10 milliGRAM(s) Oral at bedtime  pantoprazole    Tablet 40 milliGRAM(s) Oral before breakfast  sevelamer carbonate 2400 milliGRAM(s) Oral three times a day with meals  simvastatin 40 milliGRAM(s) Oral at bedtime  sucralfate suspension 1 Gram(s) Oral four times a day  zolpidem 5 milliGRAM(s) Oral at bedtime PRN                            9.8    2.30  )-----------( 62       ( 24 Dec 2021 18:05 )             30.4       Hemoglobin: 9.8 g/dL (12-24 @ 18:05)  Hemoglobin: 10.8 g/dL (12-23 @ 13:55)      12-24    136  |  99  |  62<H>  ----------------------------<  211<H>  5.1   |  26  |  14.20<H>    Ca    7.4<L>      24 Dec 2021 18:05    TPro  6.1  /  Alb  2.7<L>  /  TBili  0.7  /  DBili  x   /  AST  22  /  ALT  19  /  AlkPhos  56  12-24    Creatinine Trend: 14.20<--, 11.90<--    COAGS:           T(C): 37 (12-25-21 @ 19:56), Max: 37.2 (12-25-21 @ 13:20)  HR: 83 (12-25-21 @ 19:56) (83 - 84)  BP: 135/74 (12-25-21 @ 19:56) (135/74 - 136/66)  RR: 18 (12-25-21 @ 19:56) (17 - 18)  SpO2: 96% (12-25-21 @ 19:56) (93% - 96%)  Wt(kg): --    I&O's Summary    24 Dec 2021 07:01  -  25 Dec 2021 07:00  --------------------------------------------------------  IN: 600 mL / OUT: 1675 mL / NET: -1075 mL      Appearance: Normal appearing small frame adult male, in no acute distress	  HEENT:   Normal oral mucosa, PERRL, EOMI	  Lymphatic: No lymphadenopathy , no edema  Cardiovascular: Normal S1 S2, No JVD, No murmurs , Peripheral pulses palpable 2+ bilaterally, right shoulder fistula with good thrill + bruit.  Respiratory: Lungs clear to auscultation, normal effort 	  Gastrointestinal:  Soft, Non-tender, + BS	  Skin: No rashes, No ecchymoses, No cyanosis, hot to touch/ febrile.  Musculoskeletal: Normal range of motion, normal strength  Psychiatry:  Mood & affect appropriate    TELEMETRY:  refusing 	    ECG: NSR, LVH.  Echo: in 2017, EF normal / no significant LVH at that time.    ASSESSMENT/PLAN: 	61y Male presenting with vomitng. Found to have COVID, fever, and hypertensive to the 200s.    Recommend medical management for emesis, fevers (oral tylenol).  Workup for pancytopenia.  (Due to COVID or is this another factor?)  COVID treatment per internal med +/- ID, as he is on a low dose of oxygen at this time.  Consider repeat dialysis, and insist upon timely dosing of oral antihypertensives.  No 'emergency' signs at this time.  BP back to baseline .   Am labs refusing   Recommend obtaining full cardiac enzymes including CPK and CKMB.  Troponin is elevated but only mildly so, and I do not believe he is having a NSTEMI at this time.  Not recommending heparin infusion.  Will follow.  Cardiology     HISTORY OF PRESENT ILLNESS: HPI:  61M w/ PMHx of DM, HTN, GERD, CAD, ESRD on hemodialysis (T,Th,S), DM retinopath, Left leg amputation from [facility] assisted living,  presents to the ED with vomiting. Pt states that he has been vomiting for the last 3 days around 3 times a day, after meals. Denies any hematemesis, abdominal pain, diarrhea or constipation. He also endorses cough and mild SOB. he states that he hasn't eaten his medications because of the vomiting. In the ED patient had episode of hypertensive emergency and SOB, he was placed on bipap temporarily, given bp meds, and fluids with improvement of oxygenation and BP. currently pt comfortable on NC. Patient tested + for covid in ED, as per nursing home records, pt only vaccinated x 1 in Jan 2021.  (23 Dec 2021 21:46)    Patient refusing meds at [facility], and refusing medication here.  Able to lie flat.  States he still feels feverish and is upset about missing Vinny, and misses his girlfriend.    No angina, no palpitations, no lightheadedness, no vision changes or headaches, no fainting. A 10 pt ROS is otherwise negative.    12/25- sleeping soundly, position reversed in bed, refuses interview.  no overnight events reported.  12/26 no issues overnight, no events reported by nursing         ALBUTerol    90 MICROgram(s) HFA Inhaler 2 Puff(s) Inhalation every 4 hours PRN  amLODIPine   Tablet 10 milliGRAM(s) Oral daily  aspirin enteric coated 81 milliGRAM(s) Oral daily  benzonatate 100 milliGRAM(s) Oral three times a day PRN  chlorhexidine 2% Cloths 1 Application(s) Topical daily  cholecalciferol 1000 Unit(s) Oral daily  dexAMETHasone  Injectable 6 milliGRAM(s) IV Push daily  epoetin beverley-epbx (RETACRIT) Injectable 4000 Unit(s) IV Push <User Schedule>  ferrous    sulfate 325 milliGRAM(s) Oral daily  folic acid 1 milliGRAM(s) Oral daily  heparin   Injectable 5000 Unit(s) SubCutaneous every 12 hours  hydrALAZINE 10 milliGRAM(s) Oral three times a day  insulin lispro (ADMELOG) corrective regimen sliding scale   SubCutaneous three times a day before meals  insulin lispro (ADMELOG) corrective regimen sliding scale   SubCutaneous at bedtime  metoclopramide 5 milliGRAM(s) Oral three times a day  nortriptyline 10 milliGRAM(s) Oral at bedtime  pantoprazole    Tablet 40 milliGRAM(s) Oral before breakfast  sevelamer carbonate 2400 milliGRAM(s) Oral three times a day with meals  simvastatin 40 milliGRAM(s) Oral at bedtime  sucralfate suspension 1 Gram(s) Oral four times a day  zolpidem 5 milliGRAM(s) Oral at bedtime PRN                            9.8    2.30  )-----------( 62       ( 24 Dec 2021 18:05 )             30.4       Hemoglobin: 9.8 g/dL (12-24 @ 18:05)  Hemoglobin: 10.8 g/dL (12-23 @ 13:55)      12-24    136  |  99  |  62<H>  ----------------------------<  211<H>  5.1   |  26  |  14.20<H>    Ca    7.4<L>      24 Dec 2021 18:05    TPro  6.1  /  Alb  2.7<L>  /  TBili  0.7  /  DBili  x   /  AST  22  /  ALT  19  /  AlkPhos  56  12-24    Creatinine Trend: 14.20<--, 11.90<--    COAGS:           T(C): 37 (12-25-21 @ 19:56), Max: 37.2 (12-25-21 @ 13:20)  HR: 83 (12-25-21 @ 19:56) (83 - 84)  BP: 135/74 (12-25-21 @ 19:56) (135/74 - 136/66)  RR: 18 (12-25-21 @ 19:56) (17 - 18)  SpO2: 96% (12-25-21 @ 19:56) (93% - 96%)  Wt(kg): --    I&O's Summary    24 Dec 2021 07:01  -  25 Dec 2021 07:00  --------------------------------------------------------  IN: 600 mL / OUT: 1675 mL / NET: -1075 mL      Appearance: Normal appearing small frame adult male, in no acute distress	  HEENT:   Normal oral mucosa, PERRL, EOMI	  Lymphatic: No lymphadenopathy , no edema  Cardiovascular: Normal S1 S2, No JVD, No murmurs , Peripheral pulses palpable 2+ bilaterally, right shoulder fistula with good thrill + bruit.  Respiratory: Lungs clear to auscultation, normal effort 	  Gastrointestinal:  Soft, Non-tender, + BS	  Skin: No rashes, No ecchymoses, No cyanosis, hot to touch/ febrile.  Musculoskeletal: Normal range of motion, normal strength  Psychiatry:  Mood & affect appropriate    TELEMETRY:  refusing 	    ECG: NSR, LVH.  Echo: in 2017, EF normal / no significant LVH at that time.    ASSESSMENT/PLAN: 	61y Male presenting with vomitng. Found to have COVID, fever, and hypertensive to the 200s.    Recommend medical management for emesis, fevers (oral tylenol).  Workup for pancytopenia.  (Due to COVID or is this another factor?)  COVID treatment per internal med +/- ID, as he is on a low dose of oxygen at this time.  Ttimely dosing of oral antihypertensives.  No 'emergency' signs at this time.  BP back to baseline .   Am labs refusing

## 2021-12-26 NOTE — PROGRESS NOTE ADULT - ATTENDING COMMENTS
Patient is now alert, ambulatory.  No vomiting.   # PANCYTOPENIA - F/U JAMEEL, HIV, RPR, ANEMIA PANEL  # HX OF ANEMIA OF CKD  - IF PERSISTS, CONSULT HEME/ONC CONSULT    # SEVERE PROTEIN CALORIE MALNUTRITION, FAILURE TO THRIVE - NUTRITIONAL SUPPLEMENT  # ANEMIA OF CKD  # HLD  # ESRD ON HD TTS - W/ HX OF NONCOMPLIANCE - NEPHROLOGY CONSULT IN PROGRESS  # HTN  # DM   # PARTIALLY BLIND  # S/P LEFT BKA  # HX OF PVD  Cardiology and Nephrology f/u, appreciated.   Will control BP.  Dialysis on Monday.   HIV, HCV, JAMEEL, RPR, if patient agrees to blood draw.    COVID PCR remains positive today.   Will need to remain in hospital until negative.

## 2021-12-26 NOTE — PROGRESS NOTE ADULT - PROBLEM SELECTOR PLAN 6
pmh of dm on sliding scale  continue ss  monitor bs  adjust as needed Weight= 108.9 kg (8/22), %IBW= 135%.

## 2021-12-26 NOTE — PROGRESS NOTE ADULT - SUBJECTIVE AND OBJECTIVE BOX
MEDICAL ATTENDING NOTE  Patient is a 61y old  Male who presents with a chief complaint of Vomiting (26 Dec 2021 17:06)      HPI:  61M w/ PMHx of DM, HTN, GERD, CAD, ESRD on hemodialysis (T,Th,S), DM retinopath, Left leg amputation from [facility] assisted living,  presents to the ED with vomiting. Pt states that he has been vomiting for the last 3 days around 3 times a day, after meals. Denies any hematemesis, abdominal pain, diarrhea or constipation. He also endorses cough and mild SOB. he states that he hasn't taken his medications because of the vomiting. In the ED patient had episode of hypertensive emergency and SOB, he was placed on bipap temporarily, given bp meds, and fluids with improvement of oxygenation and BP. currently pt comfortable. Patient tested + for covid in ED, as per nursing home records, pt only vaccinated x 1 in Jan 2021.  (23 Dec 2021 21:46)      INTERVAL HPI/OVERNIGHT EVENTS: no new complaints, eager to return to [facility].     MEDICATIONS  (STANDING):  amLODIPine   Tablet 10 milliGRAM(s) Oral daily  aspirin enteric coated 81 milliGRAM(s) Oral daily  chlorhexidine 2% Cloths 1 Application(s) Topical daily  cholecalciferol 1000 Unit(s) Oral daily  dexAMETHasone  Injectable 6 milliGRAM(s) IV Push daily  epoetin beverley-epbx (RETACRIT) Injectable 4000 Unit(s) IV Push <User Schedule>  ferrous    sulfate 325 milliGRAM(s) Oral daily  folic acid 1 milliGRAM(s) Oral daily  heparin   Injectable 5000 Unit(s) SubCutaneous every 12 hours  hydrALAZINE 10 milliGRAM(s) Oral three times a day  insulin lispro (ADMELOG) corrective regimen sliding scale   SubCutaneous three times a day before meals  insulin lispro (ADMELOG) corrective regimen sliding scale   SubCutaneous at bedtime  metoclopramide 5 milliGRAM(s) Oral three times a day  nortriptyline 10 milliGRAM(s) Oral at bedtime  pantoprazole    Tablet 40 milliGRAM(s) Oral before breakfast  sevelamer carbonate 2400 milliGRAM(s) Oral three times a day with meals  simvastatin 40 milliGRAM(s) Oral at bedtime  sucralfate suspension 1 Gram(s) Oral four times a day    MEDICATIONS  (PRN):  ALBUTerol    90 MICROgram(s) HFA Inhaler 2 Puff(s) Inhalation every 4 hours PRN Shortness of Breath and/or Wheezing  benzonatate 100 milliGRAM(s) Oral three times a day PRN Cough  zolpidem 5 milliGRAM(s) Oral at bedtime PRN Insomnia      __________________________________________________  REVIEW OF SYSTEMS:    CONSTITUTIONAL: No fever,   EYES: no acute visual disturbances  NECK: No pain or stiffness  RESPIRATORY: No cough; No shortness of breath  CARDIOVASCULAR: No chest pain, no palpitations  GASTROINTESTINAL: No pain. No nausea or vomiting; No diarrhea   NEUROLOGICAL: No headache or numbness, no tremors  MUSCULOSKELETAL: No joint pain, no muscle pain  GENITOURINARY: no dysuria, no frequency, no hesitancy  PSYCHIATRY: no depression , no anxiety  ALL OTHER  ROS negative        Vital Signs Last 24 Hrs  T(C): 36.9 (26 Dec 2021 13:42), Max: 36.9 (26 Dec 2021 13:42)  T(F): 98.4 (26 Dec 2021 13:42), Max: 98.4 (26 Dec 2021 13:42)  HR: 95 (26 Dec 2021 13:42) (83 - 95)  BP: 130/83 (26 Dec 2021 13:42) (130/73 - 130/83)  BP(mean): --  RR: 17 (26 Dec 2021 13:42) (17 - 18)  SpO2: 97% (26 Dec 2021 13:42) (97% - 100%)    ________________________________________________  PHYSICAL EXAM:  GENERAL: Chronically-ill, partially blind man, alert, ambulatory  HEENT: Normocephalic;  conjunctivae and sclerae clear; moist mucous membranes;   NECK : supple  CHEST/LUNG: Clear to auscultation bilaterally with good air entry   HEART: S1 S2  regular; no murmurs, gallops or rubs  ABDOMEN: Soft, Nontender, Nondistended; Bowel sounds present  EXTREMITIES:  s/p left BKA, with prosthesis, stump, well-healed.   SKIN: warm and dry; no rash  NERVOUS SYSTEM:  Awake and alert; Oriented  to place, person and time ; no new deficits    _________________________________________________  LABS:                        11.2   2.25  )-----------( 77       ( 26 Dec 2021 07:28 )             34.1     12-26    137  |  99  |  53<H>  ----------------------------<  160<H>  4.4   |  27  |  11.60<H>    Ca    8.0<L>      26 Dec 2021 07:28  Phos  3.4     12-26  Mg     3.1     12-26    TPro  6.1  /  Alb  2.7<L>  /  TBili  0.7  /  DBili  x   /  AST  22  /  ALT  19  /  AlkPhos  56  12-24        CAPILLARY BLOOD GLUCOSE      POCT Blood Glucose.: 402 mg/dL (26 Dec 2021 16:26)  POCT Blood Glucose.: 148 mg/dL (26 Dec 2021 11:30)  POCT Blood Glucose.: 167 mg/dL (26 Dec 2021 07:17)  POCT Blood Glucose.: 230 mg/dL (25 Dec 2021 20:55)

## 2021-12-26 NOTE — PROGRESS NOTE ADULT - ATTENDING COMMENTS
Patient is now alert, ambulatory.  No vomiting.   # PANCYTOPENIA - F/U JAMEEL, HIV, RPR, ANEMIA PANEL  # HX OF ANEMIA OF CKD  - IF PERSISTS, CONSULT HEME/ONC CONSULT    # SEVERE PROTEIN CALORIE MALNUTRITION, FAILURE TO THRIVE - NUTRITIONAL SUPPLEMENT  # ANEMIA OF CKD  # HLD  # ESRD ON HD TTS - W/ HX OF NONCOMPLIANCE - NEPHROLOGY CONSULT IN PROGRESS  # HTN  # DM   # PARTIALLY BLIND  # S/P LEFT BKA  # HX OF PVD  Cardiology and Nephrology f/u, appreciated.   Will control BP.  Dialysis on Monday.   HIV, HCV, JAMEEL, RPR, if patient agrees to blood draw.    COVID PCR remains positive today.   Will need to remain in hospital until negative.  GI f/u for possible EGD, Dr. Sierra will see tomorrow.   Pancytopenia  HIV, HCV, JAMEEL, RPR in AM.

## 2021-12-26 NOTE — PROGRESS NOTE ADULT - ATTENDING COMMENTS
BP much better on stable home doses of antihypertensives.  Refuses interview again.  Hold for discharge until cleared of COVID?  No inpatient cardiac testing planned.

## 2021-12-26 NOTE — PROGRESS NOTE ADULT - ASSESSMENT
61M w/ PMHx of DM, HTN, GERD, CAD, ESRD on hemodialysis, DM retinopath, Left leg amputation from [facility] assisted living,  presents to the ED with vomiting. Pt with episode of HTN emergency in ED and found to be Covid +.

## 2021-12-27 LAB
GLUCOSE BLDC GLUCOMTR-MCNC: 289 MG/DL — HIGH (ref 70–99)
GLUCOSE BLDC GLUCOMTR-MCNC: 402 MG/DL — HIGH (ref 70–99)
GLUCOSE BLDC GLUCOMTR-MCNC: 418 MG/DL — HIGH (ref 70–99)

## 2021-12-27 PROCEDURE — 99233 SBSQ HOSP IP/OBS HIGH 50: CPT | Mod: GC

## 2021-12-27 RX ORDER — INSULIN GLARGINE 100 [IU]/ML
4 INJECTION, SOLUTION SUBCUTANEOUS AT BEDTIME
Refills: 0 | Status: DISCONTINUED | OUTPATIENT
Start: 2021-12-27 | End: 2021-12-28

## 2021-12-27 RX ORDER — ACETAMINOPHEN 500 MG
650 TABLET ORAL EVERY 6 HOURS
Refills: 0 | Status: DISCONTINUED | OUTPATIENT
Start: 2021-12-27 | End: 2021-12-29

## 2021-12-27 RX ORDER — INSULIN HUMAN 100 [IU]/ML
4 INJECTION, SOLUTION SUBCUTANEOUS ONCE
Refills: 0 | Status: COMPLETED | OUTPATIENT
Start: 2021-12-27 | End: 2021-12-27

## 2021-12-27 RX ORDER — INSULIN LISPRO 100/ML
3 VIAL (ML) SUBCUTANEOUS
Refills: 0 | Status: DISCONTINUED | OUTPATIENT
Start: 2021-12-27 | End: 2021-12-29

## 2021-12-27 RX ADMIN — Medication 5 MILLIGRAM(S): at 05:13

## 2021-12-27 RX ADMIN — CHLORHEXIDINE GLUCONATE 1 APPLICATION(S): 213 SOLUTION TOPICAL at 12:04

## 2021-12-27 RX ADMIN — Medication 1 GRAM(S): at 23:52

## 2021-12-27 RX ADMIN — INSULIN HUMAN 4 UNIT(S): 100 INJECTION, SOLUTION SUBCUTANEOUS at 17:41

## 2021-12-27 RX ADMIN — NORTRIPTYLINE HYDROCHLORIDE 10 MILLIGRAM(S): 10 CAPSULE ORAL at 21:34

## 2021-12-27 RX ADMIN — AMLODIPINE BESYLATE 10 MILLIGRAM(S): 2.5 TABLET ORAL at 05:12

## 2021-12-27 RX ADMIN — PANTOPRAZOLE SODIUM 40 MILLIGRAM(S): 20 TABLET, DELAYED RELEASE ORAL at 05:13

## 2021-12-27 RX ADMIN — Medication 1: at 22:11

## 2021-12-27 RX ADMIN — Medication 325 MILLIGRAM(S): at 12:04

## 2021-12-27 RX ADMIN — Medication 10 MILLIGRAM(S): at 13:28

## 2021-12-27 RX ADMIN — Medication 3 UNIT(S): at 22:11

## 2021-12-27 RX ADMIN — Medication 10 MILLIGRAM(S): at 21:34

## 2021-12-27 RX ADMIN — SEVELAMER CARBONATE 2400 MILLIGRAM(S): 2400 POWDER, FOR SUSPENSION ORAL at 17:27

## 2021-12-27 RX ADMIN — Medication 6: at 12:04

## 2021-12-27 RX ADMIN — Medication 6 MILLIGRAM(S): at 05:12

## 2021-12-27 RX ADMIN — Medication 10 MILLIGRAM(S): at 05:12

## 2021-12-27 RX ADMIN — Medication 81 MILLIGRAM(S): at 12:03

## 2021-12-27 RX ADMIN — Medication 1 GRAM(S): at 05:14

## 2021-12-27 RX ADMIN — ZOLPIDEM TARTRATE 5 MILLIGRAM(S): 10 TABLET ORAL at 23:52

## 2021-12-27 RX ADMIN — Medication 1 GRAM(S): at 17:27

## 2021-12-27 RX ADMIN — SEVELAMER CARBONATE 2400 MILLIGRAM(S): 2400 POWDER, FOR SUSPENSION ORAL at 12:04

## 2021-12-27 RX ADMIN — Medication 1 GRAM(S): at 12:04

## 2021-12-27 RX ADMIN — Medication 1 MILLIGRAM(S): at 12:03

## 2021-12-27 RX ADMIN — Medication 1000 UNIT(S): at 12:03

## 2021-12-27 RX ADMIN — Medication 5 MILLIGRAM(S): at 13:28

## 2021-12-27 RX ADMIN — INSULIN GLARGINE 4 UNIT(S): 100 INJECTION, SOLUTION SUBCUTANEOUS at 22:09

## 2021-12-27 RX ADMIN — Medication 5 MILLIGRAM(S): at 21:34

## 2021-12-27 RX ADMIN — SIMVASTATIN 40 MILLIGRAM(S): 20 TABLET, FILM COATED ORAL at 21:34

## 2021-12-27 NOTE — CHART NOTE - NSCHARTNOTEFT_GEN_A_CORE
Called by bedside RN for blood glucose >400.    Review of today's blood glucose shows other readings in 400's.    1) Hold sliding scale lispro insulin, will give 4 units regular and reassess later today.      Giancarlo Isaac NP

## 2021-12-27 NOTE — PROGRESS NOTE ADULT - ATTENDING COMMENTS
Patient seen/evaluated at bedside on 12/27/21. I agree with the resident progress note/outlined plan of care. My independent findings and conclusions are documented.    Pt recurrently refusing interventions, labs  Later noted in bathroom showering    a/p COVID -19 infection  DM  TII w/ severe hyperglycemia  pancytopenia    pancytopenia noted, may be related to covid- Review records for baseline as well  DM T II w/ severe hyperglycemia- drug induced  -start lantus 4 units and lispro 3 units tid Patient seen/evaluated at bedside on 12/27/21. I agree with the resident progress note/outlined plan of care. My independent findings and conclusions are documented.    Pt recurrently refusing interventions, labs  Later noted in bathroom showering    a/p   hypertensive emergency   COVID -19 infection  DM  TII w/ severe hyperglycemia  pancytopenia    pancytopenia noted, may be related to covid- Review records for baseline as well  DM T II w/ severe hyperglycemia- drug induced  -start lantus 4 units and lispro 3 units tid  -BP reviewed and acceptable--> may require medication adjustments in setting of steroids

## 2021-12-27 NOTE — PROGRESS NOTE ADULT - PROBLEM SELECTOR PLAN 7
continue home meds  add carafate  Patulous esophagus seen on CT.  May need EGD (refused in past) continue home meds  add carafate  Patulous esophagus seen on CT.  May need EGD (refused in past) - GI currently deferred

## 2021-12-27 NOTE — PROGRESS NOTE ADULT - PROBLEM SELECTOR PLAN 3
Pt tested + for covid in ed  xray clear, f/u official read  s/p vaccine x 1 in Jan 2021 as per NH chart  on NC 3L  will continue decadron, no remdesivir due to ESRD?  albuterol, tylenol, tessalon pearle prn  monitor O2 status  isolation precautions  ID f/u: Dr. Newby  -On RA

## 2021-12-27 NOTE — PROGRESS NOTE ADULT - ASSESSMENT
61M w/ PMHx of DM, HTN, GERD, CAD, ESRD on hemodialysis, DM retinopath, Left leg amputation from [facility] assisted living,  presents to the ED with vomiting. Pt with episode of HTN emergency in ED and found to be Covid +.

## 2021-12-27 NOTE — CHART NOTE - NSCHARTNOTEFT_GEN_A_CORE
EVENT:   12/26/21, 11pm, patient c/o back pain likely due to degenerative joint disease, level 9/10. Requested medication.   HPI:  60 y/o male with PMH of DM, HTN, GERD, CAD, ESRD on hemodialysis (T,Th,S), DM retinopath, Left leg amputation from [facility],  presented to the ED with vomiting. Pt stated that he had been vomiting for the last 3 days around 3 times a day, after meals. Denied any hematemesis, abdominal pain, diarrhea or constipation. He also endorsed cough and mild SOB. He stated that he hadn't eaten his medications because of the vomiting. In the ED patient had episode of hypertensive emergency and SOB, he was placed on BIPAP temporarily, given bp meds, and fluids with improvement of oxygenation and BP. currently pt comfortable on NC. Patient tested + for COVID19 in ED, as per nursing home records, pt only vaccinated x 1 in Jan 2021.  (23 Dec 2021 21:46)    OBJECTIVE:  Vital Signs Last 24 Hrs  T(C): 36.7 (26 Dec 2021 19:17), Max: 36.9 (26 Dec 2021 13:42)  T(F): 98 (26 Dec 2021 19:17), Max: 98.4 (26 Dec 2021 13:42)  HR: 80 (26 Dec 2021 22:30) (80 - 95)  BP: 138/78 (26 Dec 2021 22:30) (119/71 - 138/78)  BP(mean): --  RR: 18 (26 Dec 2021 19:17) (17 - 18)  SpO2: 100% (26 Dec 2021 19:17) (97% - 100%)    FOCUSED PHYSICAL EXAM:    LABS:                        11.2   2.25  )-----------( 77       ( 26 Dec 2021 07:28 )             34.1     12-26    137  |  99  |  53<H>  ----------------------------<  160<H>  4.4   |  27  |  11.60<H>    Ca    8.0<L>      26 Dec 2021 07:28  Phos  3.4     12-26  Mg     3.1       PLAN:   - Acetaminophen (Ofirmev) 1000 mg IV PB x 1 dose for pain.     FOLLOW UP / RESULT:   - Re-evaluate patient pain level,   - Maintain safety and comfort.

## 2021-12-27 NOTE — PROGRESS NOTE ADULT - ASSESSMENT
End Stage Renal Disease on Dialysis on Tuesday, Thursday and Saturday   av access - thrill present  anemia of renal dis - hb goal 11-12 gm- at goal  Hyperphosphetemia - controlled      A/P  plan for hemodialysis tomorrow 2 k bath  Ultrafiltration on Dialysis as tolerated  hb at goal, cont epogen   cont sevelemer and low phos diet for hyperphophetemia       Dr Tello  Nephrology   cell [phone]  office [phone]  ans serv- [phone]  [email or web address] [facility] ( wkend coverage for [facility])

## 2021-12-27 NOTE — PROGRESS NOTE ADULT - SUBJECTIVE AND OBJECTIVE BOX
Summary:   61y  Male      Subjective:       Objective:    MEDICATIONS  (STANDING):  amLODIPine   Tablet 10 milliGRAM(s) Oral daily  aspirin enteric coated 81 milliGRAM(s) Oral daily  chlorhexidine 2% Cloths 1 Application(s) Topical daily  cholecalciferol 1000 Unit(s) Oral daily  dexAMETHasone  Injectable 6 milliGRAM(s) IV Push daily  epoetin beverley-epbx (RETACRIT) Injectable 4000 Unit(s) IV Push <User Schedule>  ferrous    sulfate 325 milliGRAM(s) Oral daily  folic acid 1 milliGRAM(s) Oral daily  heparin   Injectable 5000 Unit(s) SubCutaneous every 12 hours  hydrALAZINE 10 milliGRAM(s) Oral three times a day  insulin lispro (ADMELOG) corrective regimen sliding scale   SubCutaneous three times a day before meals  insulin lispro (ADMELOG) corrective regimen sliding scale   SubCutaneous at bedtime  metoclopramide 5 milliGRAM(s) Oral three times a day  nortriptyline 10 milliGRAM(s) Oral at bedtime  pantoprazole    Tablet 40 milliGRAM(s) Oral before breakfast  sevelamer carbonate 2400 milliGRAM(s) Oral three times a day with meals  simvastatin 40 milliGRAM(s) Oral at bedtime  sucralfate suspension 1 Gram(s) Oral four times a day    MEDICATIONS  (PRN):  ALBUTerol    90 MICROgram(s) HFA Inhaler 2 Puff(s) Inhalation every 4 hours PRN Shortness of Breath and/or Wheezing  benzonatate 100 milliGRAM(s) Oral three times a day PRN Cough  zolpidem 5 milliGRAM(s) Oral at bedtime PRN Insomnia              Vital Signs Last 24 Hrs  T(C): 36.7 (26 Dec 2021 19:17), Max: 36.9 (26 Dec 2021 13:42)  T(F): 98 (26 Dec 2021 19:17), Max: 98.4 (26 Dec 2021 13:42)  HR: 89 (27 Dec 2021 05:30) (80 - 95)  BP: 152/80 (27 Dec 2021 05:30) (119/71 - 152/80)  BP(mean): --  RR: 18 (27 Dec 2021 05:30) (17 - 18)  SpO2: 98% (27 Dec 2021 05:30) (97% - 100%)      General:  Well developed, well nourished, alert and active, no pallor, NAD.  HEENT:    Normal appearance of conjunctiva, ears, nose, lips, oropharynx, and oral mucosa, anicteric.  Neck:  No masses, no asymmetry.  Lymph Nodes:  No lymphadenopathy.   Cardiovascular:  RRR normal S1/S2, no murmur.  Respiratory:  CTA B/L, normal respiratory effort.   Abdominal:   soft, no masses or tenderness, normoactive BS, NT/ND, no HSM.  Extremities:   No clubbing or cyanosis, normal capillary refill, no edema.   Skin:   No rash, jaundice, lesions, eczema.   Musculoskeletal:  No joint swelling, erythema or tenderness.   Neuro: No focal deficits.   Other:       LABS:                        11.2   2.25  )-----------( 77       ( 26 Dec 2021 07:28 )             34.1     12-26    137  |  99  |  53<H>  ----------------------------<  160<H>  4.4   |  27  |  11.60<H>    Ca    8.0<L>      26 Dec 2021 07:28  Phos  3.4     12-26  Mg     3.1     12-26            RADIOLOGY & ADDITIONAL TESTS:    < from: CT Abdomen and Pelvis No Cont (12.26.21 @ 14:07) >    ACC: [id number] EXAM:  CT ABDOMEN AND PELVIS                        ACC: [id number] EXAM:  CT CHEST                          PROCEDURE DATE:  12/26/2021          INTERPRETATION:  CLINICAL INFORMATION: Esophageal thickening, gastric   pathology, nausea,vomiting    COMPARISON: CT chest/abdomen/pelvis 11/9/2021    CONTRAST/COMPLICATIONS:  IV Contrast: NONE  Oral Contrast: NONE  Complications: None reported at time of study completion    PROCEDURE:  CT of the Chest, Abdomen and Pelvis was performed.  Sagittal and coronal reformats were performed.    FINDINGS:  CHEST:  LUNGS AND LARGE AIRWAYS: Mild pulmonary edema  PLEURA: Trace effusions.  VESSELS: Normal caliber aorta.  HEART: Moderate cardiomegaly. Trace pericardial effusion. Coronary artery   calcifications are present.  MEDIASTINUM AND PADMINI: No adenopathy.  CHEST WALL AND LOWER NECK: No masses.    ABDOMEN AND PELVIS:  LIVER: Normal.  BILE DUCTS: Nondilated.  GALLBLADDER: Normal.  SPLEEN: Normal.  PANCREAS: Not well seen.  ADRENALS: Normal.  KIDNEYS/URETERS: No hydronephrosis or urinary tract calculi. Atrophic   kidneys.    BLADDER: Underdistended limiting evaluation.  REPRODUCTIVE ORGANS: Enlarged prostate.    BOWEL: No bowel-related abnormality. Specifically, no evidence of acute   diverticulitis. Normal appendix and ileocecal region. No bowel   obstruction or bowel inflammation. Thickening and patulous esophagus   again noted as before.  PERITONEUM: Small pelvic free fluid. No free air.  VESSELS: Normal caliber aorta.  RETROPERITONEUM/LYMPH NODES: No adenopathy.  ABDOMINAL WALL: Normal.  BONES: No acute bony abnormality.    IMPRESSION:  *  Mild pulmonary edema and trace bilateral pleural effusions.  *  Moderate cardiomegaly and trace pericardial effusion.  *  Thickening and patulous esophagus again noted as before.      --- End of Report ---            CATYDELORES MCLAUGHLIN MD; Attending Radiologist  This document has been electronically signed. Dec 26 2021  5:41PM    < end of copied text >

## 2021-12-27 NOTE — PROGRESS NOTE ADULT - SUBJECTIVE AND OBJECTIVE BOX
Chief Complaint:  HPI:  61M w/ PMHx of DM, HTN, GERD, CAD, ESRD on hemodialysis (T,Th,S), DM retinopath, Left leg amputation from [facility] assisted living,  presents to the ED with vomiting. Pt states that he has been vomiting for the last 3 days around 3 times a day, after meals. Denies any hematemesis, abdominal pain, diarrhea or constipation. He also endorses cough and mild SOB. he states that he hasn't eaten his medications because of the vomiting. In the ED patient had episode of hypertensive emergency and SOB, he was placed on bipap temporarily, given bp meds, and fluids with improvement of oxygenation and BP. currently pt comfortable on NC. Patient tested + for covid in ED, as per nursing home records, pt only vaccinated x 1 in Jan 2021.  (23 Dec 2021 21:46)    No acute events overnight, frequently knocking on his hospital room door as he does not like to be cooped up inside isolation room.    Had episode of nonbloody nonbilious emesis this morning, pt. attributes it to eating too much too fast.  No nausea, and does not want any anti-emetics.        Allergies    fish (Rash)  liver (Anaphylaxis)  No Known Drug Allergies    Intolerances        REVIEW OF SYSTEMS:  Refused to answer further questions.      Vital Signs Last 24 Hrs  T(C): 36.7 (26 Dec 2021 19:17), Max: 36.9 (26 Dec 2021 13:42)  T(F): 98 (26 Dec 2021 19:17), Max: 98.4 (26 Dec 2021 13:42)  HR: 82 (27 Dec 2021 13:08) (80 - 95)  BP: 148/72 (27 Dec 2021 13:08) (119/71 - 152/80)  BP(mean): --  RR: 18 (27 Dec 2021 05:30) (17 - 18)  SpO2: 98% (27 Dec 2021 05:30) (97% - 100%)    MedsMEDICATIONS  (STANDING):  amLODIPine   Tablet 10 milliGRAM(s) Oral daily  aspirin enteric coated 81 milliGRAM(s) Oral daily  chlorhexidine 2% Cloths 1 Application(s) Topical daily  cholecalciferol 1000 Unit(s) Oral daily  dexAMETHasone  Injectable 6 milliGRAM(s) IV Push daily  epoetin beverley-epbx (RETACRIT) Injectable 4000 Unit(s) IV Push <User Schedule>  ferrous    sulfate 325 milliGRAM(s) Oral daily  folic acid 1 milliGRAM(s) Oral daily  heparin   Injectable 5000 Unit(s) SubCutaneous every 12 hours  hydrALAZINE 10 milliGRAM(s) Oral three times a day  insulin lispro (ADMELOG) corrective regimen sliding scale   SubCutaneous three times a day before meals  insulin lispro (ADMELOG) corrective regimen sliding scale   SubCutaneous at bedtime  metoclopramide 5 milliGRAM(s) Oral three times a day  nortriptyline 10 milliGRAM(s) Oral at bedtime  pantoprazole    Tablet 40 milliGRAM(s) Oral before breakfast  sevelamer carbonate 2400 milliGRAM(s) Oral three times a day with meals  simvastatin 40 milliGRAM(s) Oral at bedtime  sucralfate suspension 1 Gram(s) Oral four times a day    MEDICATIONS  (PRN):  ALBUTerol    90 MICROgram(s) HFA Inhaler 2 Puff(s) Inhalation every 4 hours PRN Shortness of Breath and/or Wheezing  benzonatate 100 milliGRAM(s) Oral three times a day PRN Cough  zolpidem 5 milliGRAM(s) Oral at bedtime PRN Insomnia      PHYSICAL EXAM:  Pt. deferred physical exam.  Ox3, speech clear and coherent.  ambulates well with LLE prosthesis      Consultant(s) Notes Reviewed:  [x ] YES  [ ] NO  Care Discussed with Consultants/Other Providers [ x] YES  [ ] NO    LABS:                        11.2   2.25  )-----------( 77       ( 26 Dec 2021 07:28 )             34.1     12-26    137  |  99  |  53<H>  ----------------------------<  160<H>  4.4   |  27  |  11.60<H>    Ca    8.0<L>      26 Dec 2021 07:28  Phos  3.4     12-26  Mg     3.1     12-26          RADIOLOGY & ADDITIONAL TESTS:    EKG:

## 2021-12-27 NOTE — PROGRESS NOTE ADULT - PROBLEM SELECTOR PLAN 2
Pt with vomiting for some time  last admission with hematemesis, denied EGD at that time, CT abdomen wnl  Continue reglan, ppi, zofran, carafate  Liquid diet  gentle hydration (ESRD)  < from: CT Abdomen and Pelvis No Cont (11.09.21 @ 16:42) >    IMPRESSION: No evidence of acute inflammatory or obstructive process in the abdomen and pelvis. Diffusely thickened and mildly patulous esophagus, similar to November 2020. Bilateral lower lobe dependent atelectasis.    < end of copied text >    Still present but at his baseline.

## 2021-12-27 NOTE — PROGRESS NOTE ADULT - ASSESSMENT
61 year old male with nausea  and vomiting       Covid   Supportive treatment as per primary team     Abnormal imaging   Thickening of esophagus likely related to chroic GERD   Need to consider EGD however will defer at this time given multiple  active  issues     Thrombocytopenia   Heme consult  CALL the DOCTOR:    - Any pain that appears to be getting worse.  -  If you have  not urinated 8 hours after surgery or have any difficulty urinating.     A responsible adult should be with you for the rest of the day and night for your safety and to help you if you needed.    Review attached FACT SHEET if applicable.

## 2021-12-27 NOTE — PROGRESS NOTE ADULT - PROBLEM SELECTOR PLAN 1
Pt with episode of HTN emergency in the ED, developed SOB  s/p Nitroglycerin, amlodipine and hydralazine with improvement in bp  temporary period of bipap, now in room air saturating well  Xray clear, no pulmonary edema, f/u official read  Troponin 188, likely in setting of demand ischemia  EKG: Sinus tachy, non specific T wave changes  f/u T2  tele monitoring  Cardio f/u, seen and appreciated.  -Stable now.

## 2021-12-27 NOTE — PROGRESS NOTE ADULT - SUBJECTIVE AND OBJECTIVE BOX
[facility] : Ans Serv [phone], Office [phone]  Dr Tello/Dr Garcia  /Dr Joao qiu /Dr KAROLINA Maddox/Dr Chapo Young/Dr Rg Washington /Dr RAFITA Mosher  _______________________________________________________________________________________________    seen and examined today for End Stage Renal Disease on Dialysis   Interval :  VITALS:  T(F): 98 (12-26-21 @ 19:17), Max: 98.4 (12-26-21 @ 13:42)  HR: 89 (12-27-21 @ 05:30)  BP: 152/80 (12-27-21 @ 05:30)  RR: 18 (12-27-21 @ 05:30)  SpO2: 98% (12-27-21 @ 05:30)    Physical Exam :-  Constitutional: NAD  Neck: Supple.  Respiratory: Bilateral equal breath sounds, few Crackles present.  Cardiovascular: S1, S2 normal, positive Murmur  Gastrointestinal: Bowel Sounds present, soft, non tender.  Neurological: Alert and Oriented x 3  Data:-  Allergies :   fish (Rash)  liver (Anaphylaxis)  No Known Drug Allergies    Hospital Medications:   MEDICATIONS  (STANDING):  amLODIPine   Tablet 10 milliGRAM(s) Oral daily  aspirin enteric coated 81 milliGRAM(s) Oral daily  chlorhexidine 2% Cloths 1 Application(s) Topical daily  cholecalciferol 1000 Unit(s) Oral daily  dexAMETHasone  Injectable 6 milliGRAM(s) IV Push daily  epoetin beverley-epbx (RETACRIT) Injectable 4000 Unit(s) IV Push <User Schedule>  ferrous    sulfate 325 milliGRAM(s) Oral daily  folic acid 1 milliGRAM(s) Oral daily  heparin   Injectable 5000 Unit(s) SubCutaneous every 12 hours  hydrALAZINE 10 milliGRAM(s) Oral three times a day  insulin lispro (ADMELOG) corrective regimen sliding scale   SubCutaneous three times a day before meals  insulin lispro (ADMELOG) corrective regimen sliding scale   SubCutaneous at bedtime  metoclopramide 5 milliGRAM(s) Oral three times a day  nortriptyline 10 milliGRAM(s) Oral at bedtime  pantoprazole    Tablet 40 milliGRAM(s) Oral before breakfast  sevelamer carbonate 2400 milliGRAM(s) Oral three times a day with meals  simvastatin 40 milliGRAM(s) Oral at bedtime  sucralfate suspension 1 Gram(s) Oral four times a day    12-26    137  |  99  |  53<H>  ----------------------------<  160<H>  4.4   |  27  |  11.60<H>    Ca    8.0<L>      26 Dec 2021 07:28  Phos  3.4     12-26  Mg     3.1     12-26      Creatinine Trend: 11.60 <--, 14.20 <--, 11.90 <--                        11.2   2.25  )-----------( 77       ( 26 Dec 2021 07:28 )             34.1

## 2021-12-28 PROCEDURE — 99233 SBSQ HOSP IP/OBS HIGH 50: CPT | Mod: GC

## 2021-12-28 RX ORDER — DEXAMETHASONE 0.5 MG/5ML
6 ELIXIR ORAL DAILY
Refills: 0 | Status: DISCONTINUED | OUTPATIENT
Start: 2021-12-28 | End: 2021-12-29

## 2021-12-28 RX ORDER — INSULIN GLARGINE 100 [IU]/ML
6 INJECTION, SOLUTION SUBCUTANEOUS AT BEDTIME
Refills: 0 | Status: DISCONTINUED | OUTPATIENT
Start: 2021-12-28 | End: 2021-12-29

## 2021-12-28 RX ADMIN — Medication 3 UNIT(S): at 11:29

## 2021-12-28 RX ADMIN — SIMVASTATIN 40 MILLIGRAM(S): 20 TABLET, FILM COATED ORAL at 22:37

## 2021-12-28 RX ADMIN — Medication 1000 UNIT(S): at 11:29

## 2021-12-28 RX ADMIN — Medication 81 MILLIGRAM(S): at 11:29

## 2021-12-28 RX ADMIN — Medication 5 MILLIGRAM(S): at 08:40

## 2021-12-28 RX ADMIN — Medication 650 MILLIGRAM(S): at 00:35

## 2021-12-28 RX ADMIN — Medication 325 MILLIGRAM(S): at 11:31

## 2021-12-28 RX ADMIN — NORTRIPTYLINE HYDROCHLORIDE 10 MILLIGRAM(S): 10 CAPSULE ORAL at 22:38

## 2021-12-28 RX ADMIN — PANTOPRAZOLE SODIUM 40 MILLIGRAM(S): 20 TABLET, DELAYED RELEASE ORAL at 08:40

## 2021-12-28 RX ADMIN — Medication 650 MILLIGRAM(S): at 00:15

## 2021-12-28 RX ADMIN — AMLODIPINE BESYLATE 10 MILLIGRAM(S): 2.5 TABLET ORAL at 08:41

## 2021-12-28 RX ADMIN — Medication 1 MILLIGRAM(S): at 11:29

## 2021-12-28 RX ADMIN — Medication 1 GRAM(S): at 11:29

## 2021-12-28 RX ADMIN — Medication 5 MILLIGRAM(S): at 22:38

## 2021-12-28 RX ADMIN — SEVELAMER CARBONATE 2400 MILLIGRAM(S): 2400 POWDER, FOR SUSPENSION ORAL at 11:31

## 2021-12-28 RX ADMIN — Medication 10 MILLIGRAM(S): at 08:40

## 2021-12-28 RX ADMIN — SEVELAMER CARBONATE 2400 MILLIGRAM(S): 2400 POWDER, FOR SUSPENSION ORAL at 08:40

## 2021-12-28 RX ADMIN — Medication 1 GRAM(S): at 08:41

## 2021-12-28 RX ADMIN — CHLORHEXIDINE GLUCONATE 1 APPLICATION(S): 213 SOLUTION TOPICAL at 11:30

## 2021-12-28 NOTE — PROGRESS NOTE ADULT - SUBJECTIVE AND OBJECTIVE BOX
Summary:   61y  Male      Subjective:       Objective:    MEDICATIONS  (STANDING):  amLODIPine   Tablet 10 milliGRAM(s) Oral daily  aspirin enteric coated 81 milliGRAM(s) Oral daily  chlorhexidine 2% Cloths 1 Application(s) Topical daily  cholecalciferol 1000 Unit(s) Oral daily  dexAMETHasone  Injectable 6 milliGRAM(s) IV Push daily  epoetin beverley-epbx (RETACRIT) Injectable 4000 Unit(s) IV Push <User Schedule>  ferrous    sulfate 325 milliGRAM(s) Oral daily  folic acid 1 milliGRAM(s) Oral daily  heparin   Injectable 5000 Unit(s) SubCutaneous every 12 hours  hydrALAZINE 10 milliGRAM(s) Oral three times a day  insulin lispro (ADMELOG) corrective regimen sliding scale   SubCutaneous three times a day before meals  insulin lispro (ADMELOG) corrective regimen sliding scale   SubCutaneous at bedtime  metoclopramide 5 milliGRAM(s) Oral three times a day  nortriptyline 10 milliGRAM(s) Oral at bedtime  pantoprazole    Tablet 40 milliGRAM(s) Oral before breakfast  sevelamer carbonate 2400 milliGRAM(s) Oral three times a day with meals  simvastatin 40 milliGRAM(s) Oral at bedtime  sucralfate suspension 1 Gram(s) Oral four times a day    MEDICATIONS  (PRN):  ALBUTerol    90 MICROgram(s) HFA Inhaler 2 Puff(s) Inhalation every 4 hours PRN Shortness of Breath and/or Wheezing  benzonatate 100 milliGRAM(s) Oral three times a day PRN Cough  zolpidem 5 milliGRAM(s) Oral at bedtime PRN Insomnia              Vital Signs Last 24 Hrs  T(C): 36.7 (26 Dec 2021 19:17), Max: 36.9 (26 Dec 2021 13:42)  T(F): 98 (26 Dec 2021 19:17), Max: 98.4 (26 Dec 2021 13:42)  HR: 89 (27 Dec 2021 05:30) (80 - 95)  BP: 152/80 (27 Dec 2021 05:30) (119/71 - 152/80)  BP(mean): --  RR: 18 (27 Dec 2021 05:30) (17 - 18)  SpO2: 98% (27 Dec 2021 05:30) (97% - 100%)      General:  Well developed, well nourished, alert and active, no pallor, NAD.  HEENT:    Normal appearance of conjunctiva, ears, nose, lips, oropharynx, and oral mucosa, anicteric.  Neck:  No masses, no asymmetry.  Lymph Nodes:  No lymphadenopathy.   Cardiovascular:  RRR normal S1/S2, no murmur.  Respiratory:  CTA B/L, normal respiratory effort.   Abdominal:   soft, no masses or tenderness, normoactive BS, NT/ND, no HSM.  Extremities:   No clubbing or cyanosis, normal capillary refill, no edema.   Skin:   No rash, jaundice, lesions, eczema.   Musculoskeletal:  No joint swelling, erythema or tenderness.   Neuro: No focal deficits.   Other:       LABS:                        11.2   2.25  )-----------( 77       ( 26 Dec 2021 07:28 )             34.1     12-26    137  |  99  |  53<H>  ----------------------------<  160<H>  4.4   |  27  |  11.60<H>    Ca    8.0<L>      26 Dec 2021 07:28  Phos  3.4     12-26  Mg     3.1     12-26            RADIOLOGY & ADDITIONAL TESTS:    < from: CT Abdomen and Pelvis No Cont (12.26.21 @ 14:07) >    ACC: [id number] EXAM:  CT ABDOMEN AND PELVIS                        ACC: [id number] EXAM:  CT CHEST                          PROCEDURE DATE:  12/26/2021          INTERPRETATION:  CLINICAL INFORMATION: Esophageal thickening, gastric   pathology, nausea,vomiting    COMPARISON: CT chest/abdomen/pelvis 11/9/2021    CONTRAST/COMPLICATIONS:  IV Contrast: NONE  Oral Contrast: NONE  Complications: None reported at time of study completion    PROCEDURE:  CT of the Chest, Abdomen and Pelvis was performed.  Sagittal and coronal reformats were performed.    FINDINGS:  CHEST:  LUNGS AND LARGE AIRWAYS: Mild pulmonary edema  PLEURA: Trace effusions.  VESSELS: Normal caliber aorta.  HEART: Moderate cardiomegaly. Trace pericardial effusion. Coronary artery   calcifications are present.  MEDIASTINUM AND PADMNII: No adenopathy.  CHEST WALL AND LOWER NECK: No masses.    ABDOMEN AND PELVIS:  LIVER: Normal.  BILE DUCTS: Nondilated.  GALLBLADDER: Normal.  SPLEEN: Normal.  PANCREAS: Not well seen.  ADRENALS: Normal.  KIDNEYS/URETERS: No hydronephrosis or urinary tract calculi. Atrophic   kidneys.    BLADDER: Underdistended limiting evaluation.  REPRODUCTIVE ORGANS: Enlarged prostate.    BOWEL: No bowel-related abnormality. Specifically, no evidence of acute   diverticulitis. Normal appendix and ileocecal region. No bowel   obstruction or bowel inflammation. Thickening and patulous esophagus   again noted as before.  PERITONEUM: Small pelvic free fluid. No free air.  VESSELS: Normal caliber aorta.  RETROPERITONEUM/LYMPH NODES: No adenopathy.  ABDOMINAL WALL: Normal.  BONES: No acute bony abnormality.    IMPRESSION:  *  Mild pulmonary edema and trace bilateral pleural effusions.  *  Moderate cardiomegaly and trace pericardial effusion.  *  Thickening and patulous esophagus again noted as before.      --- End of Report ---            CATYDELORES MCLAUGHLIN MD; Attending Radiologist  This document has been electronically signed. Dec 26 2021  5:41PM    < end of copied text >

## 2021-12-28 NOTE — PROGRESS NOTE ADULT - ASSESSMENT
61 M w/ PMHx of DM, HTN, GERD, CAD, ESRD on hemodialysis, DM retinopath, Left leg amputation from [facility] assisted living,  presents to the ED with vomiting. Pt with episode of HTN emergency in ED and found to be Covid +.

## 2021-12-28 NOTE — PROGRESS NOTE ADULT - ASSESSMENT
61 year old male with nausea  and vomiting       Covid   Supportive treatment as per primary team     Abnormal imaging   Thickening of esophagus likely related to chroic GERD   Need to consider EGD however will defer at this time given multiple  active  issues     Thrombocytopenia   Heme consult

## 2021-12-28 NOTE — CHART NOTE - NSCHARTNOTESELECT_GEN_ALL_CORE
Event Note
Off Service Note
Elevated troponin/Event Note
Event Note
Event Note
Refusing admission to telemetry/Event Note

## 2021-12-28 NOTE — PROGRESS NOTE ADULT - PROVIDER SPECIALTY LIST ADULT
Gastroenterology
Cardiology
Cardiology
Gastroenterology
Internal Medicine
Nephrology
Nephrology
Internal Medicine
Nephrology
Internal Medicine

## 2021-12-28 NOTE — PROGRESS NOTE ADULT - PROBLEM SELECTOR PLAN 2
Pt with vomiting for some time  last admission with hematemesis, denied EGD at that time, CT abdomen wnl  Continue reglan, protonix, carafate

## 2021-12-28 NOTE — PROGRESS NOTE ADULT - PROBLEM SELECTOR PLAN 6
- Check A1c  - Continue admelog 3 units  premeal for now  - Continue Lantus 4 units QHS  - Continue current admelog SS  Patient at times refusing blood glucose checks - Check A1c  - Continue admelog 3 units  premeal for now; patient refuses to take at times per documentation  - Continue Lantus; will increase to 6  units QHS given elevated blood glucose trends overnight and patient takes lantus refuses admelog at times continue to uptitrate when more blood glucose data becomes available.  - Continue current admelog SS  Patient at times refusing blood glucose checks

## 2021-12-28 NOTE — PROGRESS NOTE ADULT - PROBLEM SELECTOR PLAN 4
of unclear etiology; possibly due to Covid?  -f/u hiv, [place], rpr, hcv  - Continue to monitor   - Consider Heme consult coffee

## 2021-12-28 NOTE — PROGRESS NOTE ADULT - PROBLEM SELECTOR PROBLEM 1
Hypertensive emergency

## 2021-12-28 NOTE — PROGRESS NOTE ADULT - PROBLEM SELECTOR PLAN 1
Pt with episode of HTN emergency in the ED, developed SOB temporarily requiring bipap,  s/p Nitroglycerin, amlodipine and hydralazine in ED;   -Chest xray on admission no consolidation or pleural effusion   -Troponin 188 12/23 --->242 12/24  intermittently refuses labs, possibly due to  demand ischemia; Seen by Cardiology this admission; troponin mildly elevated unlikely NSTEMI.   -BP improving per trend  -Continue amlopidine and hydralazine  -Continue to monitor

## 2021-12-28 NOTE — PROGRESS NOTE ADULT - SUBJECTIVE AND OBJECTIVE BOX
NP Note discussed with  Primary Attending; Dr Morel    Patient is a 61y old  Male who presents with a chief complaint of Vomiting (28 Dec 2021 12:09)      INTERVAL HPI/OVERNIGHT EVENTS: Patient seen and examined earlier this am; having breakfast  no new complaints. RN does report patient with episode of vomiting ( nonbloody)  prior to breakfast.    MEDICATIONS  (STANDING):  amLODIPine   Tablet 10 milliGRAM(s) Oral daily  aspirin enteric coated 81 milliGRAM(s) Oral daily  chlorhexidine 2% Cloths 1 Application(s) Topical daily  cholecalciferol 1000 Unit(s) Oral daily  dexAMETHasone  Injectable 6 milliGRAM(s) IV Push daily  epoetin beverley-epbx (RETACRIT) Injectable 4000 Unit(s) IV Push <User Schedule>  ferrous    sulfate 325 milliGRAM(s) Oral daily  folic acid 1 milliGRAM(s) Oral daily  heparin   Injectable 5000 Unit(s) SubCutaneous every 12 hours  hydrALAZINE 10 milliGRAM(s) Oral three times a day  insulin glargine Injectable (LANTUS) 4 Unit(s) SubCutaneous at bedtime  insulin lispro (ADMELOG) corrective regimen sliding scale   SubCutaneous three times a day before meals  insulin lispro (ADMELOG) corrective regimen sliding scale   SubCutaneous at bedtime  insulin lispro Injectable (ADMELOG) 3 Unit(s) SubCutaneous three times a day before meals  metoclopramide 5 milliGRAM(s) Oral three times a day  nortriptyline 10 milliGRAM(s) Oral at bedtime  pantoprazole    Tablet 40 milliGRAM(s) Oral before breakfast  sevelamer carbonate 2400 milliGRAM(s) Oral three times a day with meals  simvastatin 40 milliGRAM(s) Oral at bedtime  sucralfate suspension 1 Gram(s) Oral four times a day    MEDICATIONS  (PRN):  acetaminophen     Tablet .. 650 milliGRAM(s) Oral every 6 hours PRN Mild Pain (1 - 3)  ALBUTerol    90 MICROgram(s) HFA Inhaler 2 Puff(s) Inhalation every 4 hours PRN Shortness of Breath and/or Wheezing  benzonatate 100 milliGRAM(s) Oral three times a day PRN Cough  zolpidem 5 milliGRAM(s) Oral at bedtime PRN Insomnia      __________________________________________________  REVIEW OF SYSTEMS:    CONSTITUTIONAL: No fever,   RESPIRATORY: No cough; No shortness of breath  CARDIOVASCULAR: No chest pain, no palpitations  GASTROINTESTINAL: No pain. No nausea or vomiting( vomited x1 per RN report) ; No diarrhea   NEUROLOGICAL: No headache or numbness, no dizziness  MUSCULOSKELETAL: No joint pain, no muscle pain  GENITOURINARY: HD  ALL OTHER  ROS negative        Vital Signs Last 24 Hrs  T(C): 36.8 (27 Dec 2021 20:41), Max: 36.8 (27 Dec 2021 20:41)  T(F): 98.2 (27 Dec 2021 20:41), Max: 98.2 (27 Dec 2021 20:41)  HR: 96 (27 Dec 2021 20:41) (82 - 96)  BP: 132/81 (27 Dec 2021 20:41) (132/81 - 156/71)  BP(mean): --  RR: 18 (27 Dec 2021 20:41) (18 - 18)  SpO2: 100% (27 Dec 2021 20:41) (97% - 100%)    ________________________________________________  PHYSICAL EXAM:  GENERAL: NAD  HEENT: Normocephalic;  atraumatic   CHEST/LUNG: Clear to auscultation bilaterally with good air entry   HEART: +S1 +S2  regular  ABDOMEN: Soft, Nontender, Nondistended; Bowel sounds present  EXTREMITIES:  RUE HD access site + bruit, + thrill.  Left below knee amputation ;  prosthesis at bedside. RLE no edema.   SKIN: warm and dry; no rash  NERVOUS SYSTEM:  Awake and alert; Oriented  to place, person and time ; no new deficits    _________________________________________________  LABS:              CAPILLARY BLOOD GLUCOSE      POCT Blood Glucose.: 289 mg/dL (27 Dec 2021 22:01)  POCT Blood Glucose.: 418 mg/dL (27 Dec 2021 17:09)        RADIOLOGY & ADDITIONAL TESTS:    COVID-19 PCR . (12.26.21 @ 12:22)    COVID-19 PCR: Detected: EUA/IVD  You can help in the fight against COVID-19. [facility] may contact  you to see if you are interested in voluntarily participating in one of  our clinical trials.  This test has been validated by [employer] to be accurate;  though it has not been FDA cleared/approved by the usual pathway  As with all laboratory test, results should be correlated with clinical  findings.  https://www.fda.gov/media/397417/download  https://www.fda.gov/media/280644/download      < from: Xray Chest 1 View- PORTABLE-Urgent (Xray Chest 1 View- PORTABLE-Urgent .) (12.23.21 @ 13:30) >    ACC: [id number] EXAM:  XR CHEST PORTABLE URGENT 1V                          PROCEDURE DATE:  12/23/2021          INTERPRETATION:  CLINICAL STATEMENT: Chest Pain.    TECHNIQUE: AP view of the chest.    COMPARISON: 11/7/2021    FINDINGS/  IMPRESSION:  No consolidation or pleural effusion    Heart size cannot be accurately assessed in this projection.    Right axillary stent again noted    --- End of Report ---        ROSSI ARIAS MD; Attending Radiologist  This document has been electronically signed. Dec 23 2021  4:47PM    < end of copied text >

## 2021-12-28 NOTE — PROGRESS NOTE ADULT - PROBLEM SELECTOR PLAN 7
- Continue current medication regimen  - Thickening and Patulous esophagus seen on CT.  May need EGD (refused in past) - GI, Dr Sharma following; currently deferred at this time  - GI f/u

## 2021-12-28 NOTE — PROGRESS NOTE ADULT - SUBJECTIVE AND OBJECTIVE BOX
[facility] : [facility] [phone], Office [phone]  Dr Tello/Dr Garcia  /Dr Joao qiu /Dr KAROLINA Maddox/Dr Chapo Young/Dr Rg Washington /Dr RAFITA Mosher  _______________________________________________________________________________________________    seen and examined today for End Stage Renal Disease on Dialysis   Interval :  VITALS:  T(F): 98.2 (12-27-21 @ 20:41), Max: 98.2 (12-27-21 @ 20:41)  HR: 96 (12-27-21 @ 20:41)  BP: 132/81 (12-27-21 @ 20:41)  RR: 18 (12-27-21 @ 20:41)  SpO2: 100% (12-27-21 @ 20:41)  Wt(kg): --    Physical Exam :-  Constitutional: NAD  Neck: Supple.  Respiratory: Bilateral equal breath sounds, few Crackles present.  Cardiovascular: S1, S2 normal, positive Murmur  Gastrointestinal: Bowel Sounds present, soft, non tender.  Neurological: Alert  Psychiatric: Normal mood, normal affect  Data:-  Allergies :   fish (Rash)  liver (Anaphylaxis)  No Known Drug Allergies    Hospital Medications:   MEDICATIONS  (STANDING):  amLODIPine   Tablet 10 milliGRAM(s) Oral daily  aspirin enteric coated 81 milliGRAM(s) Oral daily  chlorhexidine 2% Cloths 1 Application(s) Topical daily  cholecalciferol 1000 Unit(s) Oral daily  dexAMETHasone  Injectable 6 milliGRAM(s) IV Push daily  epoetin beverley-epbx (RETACRIT) Injectable 4000 Unit(s) IV Push <User Schedule>  ferrous    sulfate 325 milliGRAM(s) Oral daily  folic acid 1 milliGRAM(s) Oral daily  heparin   Injectable 5000 Unit(s) SubCutaneous every 12 hours  hydrALAZINE 10 milliGRAM(s) Oral three times a day  insulin glargine Injectable (LANTUS) 4 Unit(s) SubCutaneous at bedtime  insulin lispro (ADMELOG) corrective regimen sliding scale   SubCutaneous three times a day before meals  insulin lispro (ADMELOG) corrective regimen sliding scale   SubCutaneous at bedtime  insulin lispro Injectable (ADMELOG) 3 Unit(s) SubCutaneous three times a day before meals  metoclopramide 5 milliGRAM(s) Oral three times a day  nortriptyline 10 milliGRAM(s) Oral at bedtime  pantoprazole    Tablet 40 milliGRAM(s) Oral before breakfast  sevelamer carbonate 2400 milliGRAM(s) Oral three times a day with meals  simvastatin 40 milliGRAM(s) Oral at bedtime  sucralfate suspension 1 Gram(s) Oral four times a day          Creatinine Trend: 11.60 <--, 14.20 <--, 11.90 <--

## 2021-12-28 NOTE — CHART NOTE - NSCHARTNOTEFT_GEN_A_CORE
EVENT:   12/28/21, 12am, patient c/o generalized body aches, requested pain medication.  HPI:     60 y/o male w/ PMH of DM, HTN, GERD, CAD, ESRD on hemodialysis (T,Th,S), DM retinopath, Left leg amputation from [facility],  presented to the ED with vomiting. Pt stated that he had been vomiting for the last 3 days around 3 times a day, after meals. Denied any hematemesis, abdominal pain, diarrhea or constipation. He also endorsed cough and mild SOB. he states that he hasn't eaten his medications because of the vomiting. In the ED patient had episode of hypertensive emergency and SOB, he was placed on BIPAP temporarily, given bp meds, and fluids with improvement of oxygenation and BP. currently pt comfortable on NC. Patient tested + for COVID19 in ED, as per nursing home records, pt only vaccinated x 1 in Jan 2021.  (23 Dec 2021 21:46)    OBJECTIVE:  Vital Signs Last 24 Hrs  T(C): 36.8 (27 Dec 2021 20:41), Max: 36.8 (27 Dec 2021 20:41)  T(F): 98.2 (27 Dec 2021 20:41), Max: 98.2 (27 Dec 2021 20:41)  HR: 96 (27 Dec 2021 20:41) (82 - 96)  BP: 132/81 (27 Dec 2021 20:41) (132/81 - 156/71)  BP(mean): --  RR: 18 (27 Dec 2021 20:41) (18 - 18)  SpO2: 100% (27 Dec 2021 20:41) (97% - 100%)    FOCUSED PHYSICAL EXAM:    LABS:                        11.2   2.25  )-----------( 77       ( 26 Dec 2021 07:28 )             34.1     12-26    137  |  99  |  53<H>  ----------------------------<  160<H>  4.4   |  27  |  11.60<H>    Ca    8.0<L>      26 Dec 2021 07:28  Phos  3.4     12-26  Mg     3.1     12    PLAN:   - Tylenol 650 mg po Q6hrs PRN for mild pain level (1-3).     FOLLOW UP / RESULT:   - Re-evaluate patient pain level,   - Maintain safety and comfort.

## 2021-12-28 NOTE — PROGRESS NOTE ADULT - ATTENDING COMMENTS
Patient seen/evaluated at bedside on 12/28/21. I agree with the resident progress note/outlined plan of care. My independent findings and conclusions are documented.    States he wants to be left alone. Only intermittently taking medications. Has continuously refused labs    a/p   hypertensive emergency   COVID -19 infection  DM  TII w/ severe hyperglycemia  pancytopenia    pancytopenia noted, may be related to covid- Review records for baseline as well. Noted with improving platelets in last lab samples however, no recent blood work available due to patient refusal  DM T II w/ severe hyperglycemia- drug induced--> improved today with regimen of lantus 4 units + prandial 3 units tid. No prandial insulin administered as patient refusing fingersticks.  -increase lantus to 6 units  -BP reviewed and acceptable--> may require medication adjustments in setting of steroids.

## 2021-12-28 NOTE — PROGRESS NOTE ADULT - ASSESSMENT
End Stage Renal Disease on Dialysis on Tuesday, Thursday and Saturday   av access - thrill present  anemia of renal dis - hb goal 11-12 gm- at goal  Hyperphosphetemia - controlled      A/P  plan for hemodialysis today 2 k bath  Ultrafiltration on Dialysis as tolerated  hb at goal, cont epogen   cont sevelemer and low phos diet for hyperphophetemia       Dr Tello  Nephrology   cell [phone]  office [phone]  ans serv- [phone]  [email or web address] [facility] ( [facility] for [facility])

## 2021-12-28 NOTE — PROGRESS NOTE ADULT - PROBLEM SELECTOR PLAN 3
Pt tested + for covid in ED;  s/p vaccine x 1 in Jan 2021 as per NH chart  -Continue Decadron day 6  -Continue albuterol, tylenol, tessalon pearle prn  -Currently with no supplemental oxygen requirement; Currently % on RA   -Continue isolation precautions  - f/u Covid inflammatory markers

## 2021-12-29 ENCOUNTER — TRANSCRIPTION ENCOUNTER (OUTPATIENT)
Age: 61
End: 2021-12-29

## 2021-12-29 VITALS
DIASTOLIC BLOOD PRESSURE: 75 MMHG | RESPIRATION RATE: 18 BRPM | HEART RATE: 98 BPM | OXYGEN SATURATION: 93 % | TEMPERATURE: 100 F | SYSTOLIC BLOOD PRESSURE: 176 MMHG

## 2021-12-29 LAB — GLUCOSE BLDC GLUCOMTR-MCNC: 90 MG/DL — SIGNIFICANT CHANGE UP (ref 70–99)

## 2021-12-29 PROCEDURE — 99239 HOSP IP/OBS DSCHRG MGMT >30: CPT | Mod: GC

## 2021-12-29 RX ORDER — DEXAMETHASONE 0.5 MG/5ML
1 ELIXIR ORAL
Qty: 0 | Refills: 0 | DISCHARGE
Start: 2021-12-29

## 2021-12-29 RX ORDER — INSULIN GLARGINE 100 [IU]/ML
6 INJECTION, SOLUTION SUBCUTANEOUS
Qty: 1 | Refills: 0
Start: 2021-12-29 | End: 2022-01-27

## 2021-12-29 RX ORDER — ACETAMINOPHEN 500 MG
650 TABLET ORAL EVERY 6 HOURS
Refills: 0 | Status: DISCONTINUED | OUTPATIENT
Start: 2021-12-29 | End: 2021-12-29

## 2021-12-29 RX ORDER — DEXAMETHASONE 0.5 MG/5ML
1 ELIXIR ORAL
Qty: 3 | Refills: 0
Start: 2021-12-29 | End: 2021-12-31

## 2021-12-29 RX ORDER — INSULIN LISPRO 100/ML
6 VIAL (ML) SUBCUTANEOUS
Qty: 1 | Refills: 0
Start: 2021-12-29

## 2021-12-29 RX ORDER — ALBUTEROL 90 UG/1
2 AEROSOL, METERED ORAL
Qty: 0 | Refills: 0 | DISCHARGE
Start: 2021-12-29

## 2021-12-29 RX ORDER — SUCRALFATE 1 G
10 TABLET ORAL
Qty: 1200 | Refills: 0
Start: 2021-12-29 | End: 2022-01-27

## 2021-12-29 RX ADMIN — ERYTHROPOIETIN 4000 UNIT(S): 10000 INJECTION, SOLUTION INTRAVENOUS; SUBCUTANEOUS at 12:00

## 2021-12-29 NOTE — DISCHARGE NOTE PROVIDER - NSDCCPCAREPLAN_GEN_ALL_CORE_FT
PRINCIPAL DISCHARGE DIAGNOSIS  Diagnosis: COVID-19  Assessment and Plan of Treatment: 1. You should restrict activities outside your home, except for getting medical care.  2. Do not go to work, school, or public areas.  3. Avoid using public transportation, ride-sharing, or taxis.  4. Separate yourself from other people and animals in your home.  5. Call ahead before visiting your doctor.    6. Wear a facemask when around other people or pets.  7. Cover your coughs and sneezes.   8. Clean your hands often.  Avoid touching your face with unwashed hands.  9. Avoid sharing personal household items.    10. Clean all “high-touch” surfaces every day.  ie counters, tabletops, doorknobs, bathroom fixtures, toilets, phones, keyboards, tablets, and bedside tables.  11. Monitor your symptoms.  12. Maitain home isolation precautions for 14 days.        SECONDARY DISCHARGE DIAGNOSES  Diagnosis: Diabetes mellitus  Assessment and Plan of Treatment: You have a history of diabetes type 2.  Make sure you get your HgA1c checked every three months.  If you take oral diabetes medications, check your blood glucose two times a day.  If you take insulin, check your blood glucose before meals and at bedtime.  It's important not to skip any meals.  Keep a log of your blood glucose results and always take it with you to your doctor appointments.  Keep a list of your current medications including injectables and over the counter medications and bring this medication list with you to all your doctor appointments.  If you have not seen your ophthalmologist this year call for appointment.  Check your feet daily for redness, sores, or openings. Do not self treat. If no improvement in two days call your primary care physician for an appointment.  Low blood sugar (hypoglycemia) is a blood sugar below 70mg/dl. Check your blood sugar if you feel signs/symptoms of hypoglycemia. If your blood sugar is below 70 take 15 grams of carbohydrates (ex 4 oz of apple juice, 3-4 glucose tablets, or 4-6 oz of regular soda) wait 15 minutes and repeat blood sugar to make sure it comes up above 70.  If your blood sugar is above 70 and you are due for a meal, have a meal.  If you are not due for a meal have a snack.  This snack helps keeps your blood sugar at a safe range.      Diagnosis: ESRD on dialysis  Assessment and Plan of Treatment: Continue with your dialysis treatments. Follow with your nephrologist (kidney physician). Continue your medications as prescribed.

## 2021-12-29 NOTE — DISCHARGE NOTE PROVIDER - HOSPITAL COURSE
61M w/ PMHx of DM, HTN, GERD, CAD, ESRD on hemodialysis (T,Th,S), DM retinopath, Left leg amputation from [facility],  presents to the ED with vomiting. Pt states that he has been vomiting for the last 3 days around 3 times a day, after meals. Denies any hematemesis, abdominal pain, diarrhea or constipation. He also endorses cough and mild SOB. he states that he hasn't eaten his medications because of the vomiting. In the ED patient had episode of hypertensive emergency and SOB, he was placed on bipap temporarily, given bp meds, and fluids with improvement of oxygenation and BP. Patient admitted to medicine for abdominal pain and vomiting, tested + for covid. As per nursing home records, pt only vaccinated x 1 in Jan 2021.  GI and Nephro consulted, pt with ESRD requiring HD (T, Th, S), last HD yesterday. GI evaluated pt for thickening of esophagus likely related to chronic GERD. Need to consider EGD however will defer at this time given multiple  active  issues. Patient was refusing blood glucose monitoring, medications and EGD while at the hospital. Despite having fever 100.4 he also refused oral Tylenol. Despite positive COVID test patient does not require supplemental oxygen and is saturating 95% on RA. SW and CM consulted for dc arrangements as pt is from [facility] for safe discharge.    Given patient's improved clinical status and current hemodynamic stability, decision was made to discharge.  Please refer to patient's complete medical chart with documents for a full hospital course, for this is only a brief summary.         61M w/ PMHx of DM, HTN, GERD, CAD, ESRD on hemodialysis (T,Th,S), DM retinopath, Left leg amputation from [facility],  presents to the ED with vomiting. Pt states that he has been vomiting for the last 3 days around 3 times a day, after meals. Denies any hematemesis, abdominal pain, diarrhea or constipation. He also endorses cough and mild SOB. he states that he hasn't eaten his medications because of the vomiting. In the ED patient had episode of hypertensive emergency and SOB, he was placed on bipap temporarily, given bp meds, and fluids with improvement of oxygenation and BP. Patient admitted to medicine for abdominal pain and vomiting, tested + for covid. As per nursing home records, pt only vaccinated x 1 in Jan 2021.  GI and Nephro consulted, pt with ESRD requiring HD (T, Th, S), last HD 12/28 but not completed. Patient had additional HD on 12/29 to complete full dialysis before discharge. GI evaluated pt for thickening of esophagus likely related to chronic GERD. Need to consider EGD however will defer at this time given multiple  active  issues. Patient was refusing blood glucose monitoring, medications and EGD while at the hospital. Despite having fever 100.4 he also refused oral Tylenol. Despite positive COVID test patient does not require supplemental oxygen and is saturating 95% on RA. SW and CM consulted for dc arrangements as pt is from [facility] for safe discharge.    Given patient's improved clinical status and current hemodynamic stability, decision was made to discharge.  Please refer to patient's complete medical chart with documents for a full hospital course, for this is only a brief summary.

## 2021-12-29 NOTE — DISCHARGE NOTE NURSING/CASE MANAGEMENT/SOCIAL WORK - NSDCFUADDAPPT_GEN_ALL_CORE_FT
Temporary change in outpatient HD schedule at [facility] due to covid+ status: Tues-Thurs-Sat 7:30pm

## 2021-12-29 NOTE — DISCHARGE NOTE PROVIDER - CARE PROVIDER_API CALL
Dario De La Torre)  Medicine  [id number] [place] [address]  Phone: [phone]  Fax: [phone]  Established Patient  Follow Up Time: 1 week

## 2021-12-29 NOTE — DISCHARGE NOTE NURSING/CASE MANAGEMENT/SOCIAL WORK - PATIENT PORTAL LINK FT
You can access the FollowMyHealth Patient Portal offered by [facility] by registering at the following website: [email or web address]. By joining [facility]’s FollowMyHealth portal, you will also be able to view your health information using other applications (apps) compatible with our system.

## 2021-12-29 NOTE — DISCHARGE NOTE NURSING/CASE MANAGEMENT/SOCIAL WORK - NSDCPEFALRISK_GEN_ALL_CORE
For information on Fall & Injury Prevention, visit: [email or web address] OR  [email or web address] OR  https://www.cdc.gov/steadi/patient.html

## 2021-12-29 NOTE — DISCHARGE NOTE PROVIDER - NSDCMRMEDTOKEN_GEN_ALL_CORE_FT
albuterol 90 mcg/inh inhalation aerosol: 2 puff(s) inhaled every 4 hours, As needed, Shortness of Breath and/or Wheezing  amLODIPine 10 mg oral tablet: 1 tab(s) orally once a day  aspirin 81 mg oral delayed release tablet: 1 tab(s) orally once a day  cholecalciferol oral tablet: 1000 unit(s) orally once a day  dexamethasone 6 mg oral tablet: 1 tab(s) orally once a day  ferrous sulfate 325 mg (65 mg elemental iron) oral tablet: 1 tab(s) orally every other day  folic acid 1 mg oral tablet: 1 tab(s) orally once a day  insulin glargine: 6 unit(s) subcutaneously once a day (at bedtime)   insulin lispro 100 units/mL injectable solution: 6  injectable 3 times a day  Lokelma 10 g oral powder for reconstitution: 10 gram(s) orally once a day  every sunday   metoclopramide 5 mg oral tablet: 1 tab(s) orally 3 times a day  nortriptyline 10 mg oral capsule: 1 cap(s) orally once a day (at bedtime)  pantoprazole 40 mg oral delayed release tablet: 1 tab(s) orally every 12 hours  Polina-Dior oral tablet: 1 tab(s) orally once a day  sevelamer carbonate 800 mg oral tablet: 3 tab(s) orally 3 times a day (with meals)  simvastatin 40 mg oral tablet: 1 tab(s) orally once a day (at bedtime)  sucralfate 1 g/10 mL oral suspension: 10 milliliter(s) orally 4 times a day  zolpidem 5 mg oral tablet: 1 tab(s) orally once a day (at bedtime), As needed, Insomnia   albuterol 90 mcg/inh inhalation aerosol: 2 puff(s) inhaled every 4 hours, As needed, Shortness of Breath and/or Wheezing  amLODIPine 10 mg oral tablet: 1 tab(s) orally once a day  aspirin 81 mg oral delayed release tablet: 1 tab(s) orally once a day  cholecalciferol oral tablet: 1000 unit(s) orally once a day  ferrous sulfate 325 mg (65 mg elemental iron) oral tablet: 1 tab(s) orally every other day  folic acid 1 mg oral tablet: 1 tab(s) orally once a day  insulin glargine: 6 unit(s) subcutaneously once a day (at bedtime)   insulin lispro 100 units/mL injectable solution: 6  injectable 3 times a day  Lokelma 10 g oral powder for reconstitution: 10 gram(s) orally once a day  every sunday   metoclopramide 5 mg oral tablet: 1 tab(s) orally 3 times a day  nortriptyline 10 mg oral capsule: 1 cap(s) orally once a day (at bedtime)  pantoprazole 40 mg oral delayed release tablet: 1 tab(s) orally every 12 hours  Polina-Dior oral tablet: 1 tab(s) orally once a day  sevelamer carbonate 800 mg oral tablet: 3 tab(s) orally 3 times a day (with meals)  simvastatin 40 mg oral tablet: 1 tab(s) orally once a day (at bedtime)  sucralfate 1 g/10 mL oral suspension: 10 milliliter(s) orally 4 times a day  zolpidem 5 mg oral tablet: 1 tab(s) orally once a day (at bedtime), As needed, Insomnia

## 2022-01-06 LAB — GLUCOSE BLDC GLUCOMTR-MCNC: 91 MG/DL — SIGNIFICANT CHANGE UP (ref 70–99)

## 2022-01-06 PROCEDURE — 74176 CT ABD & PELVIS W/O CONTRAST: CPT

## 2022-01-06 PROCEDURE — 99285 EMERGENCY DEPT VISIT HI MDM: CPT

## 2022-01-06 PROCEDURE — 87635 SARS-COV-2 COVID-19 AMP PRB: CPT

## 2022-01-06 PROCEDURE — 87040 BLOOD CULTURE FOR BACTERIA: CPT

## 2022-01-06 PROCEDURE — 83690 ASSAY OF LIPASE: CPT

## 2022-01-06 PROCEDURE — 84100 ASSAY OF PHOSPHORUS: CPT

## 2022-01-06 PROCEDURE — 99261: CPT

## 2022-01-06 PROCEDURE — 71250 CT THORAX DX C-: CPT

## 2022-01-06 PROCEDURE — 85730 THROMBOPLASTIN TIME PARTIAL: CPT

## 2022-01-06 PROCEDURE — 80053 COMPREHEN METABOLIC PANEL: CPT

## 2022-01-06 PROCEDURE — 93005 ELECTROCARDIOGRAM TRACING: CPT

## 2022-01-06 PROCEDURE — 83605 ASSAY OF LACTIC ACID: CPT

## 2022-01-06 PROCEDURE — 94660 CPAP INITIATION&MGMT: CPT

## 2022-01-06 PROCEDURE — 85027 COMPLETE CBC AUTOMATED: CPT

## 2022-01-06 PROCEDURE — 87340 HEPATITIS B SURFACE AG IA: CPT

## 2022-01-06 PROCEDURE — 82803 BLOOD GASES ANY COMBINATION: CPT

## 2022-01-06 PROCEDURE — 96375 TX/PRO/DX INJ NEW DRUG ADDON: CPT

## 2022-01-06 PROCEDURE — 71045 X-RAY EXAM CHEST 1 VIEW: CPT

## 2022-01-06 PROCEDURE — 85025 COMPLETE CBC W/AUTO DIFF WBC: CPT

## 2022-01-06 PROCEDURE — 0225U NFCT DS DNA&RNA 21 SARSCOV2: CPT

## 2022-01-06 PROCEDURE — 84484 ASSAY OF TROPONIN QUANT: CPT

## 2022-01-06 PROCEDURE — 85610 PROTHROMBIN TIME: CPT

## 2022-01-06 PROCEDURE — 83735 ASSAY OF MAGNESIUM: CPT

## 2022-01-06 PROCEDURE — 80048 BASIC METABOLIC PNL TOTAL CA: CPT

## 2022-01-06 PROCEDURE — 36415 COLL VENOUS BLD VENIPUNCTURE: CPT

## 2022-01-06 PROCEDURE — 96374 THER/PROPH/DIAG INJ IV PUSH: CPT

## 2022-01-06 PROCEDURE — 82962 GLUCOSE BLOOD TEST: CPT

## 2022-01-07 NOTE — ED ADULT TRIAGE NOTE - HEIGHT IN FEET
FORREST Amezquita LPN notified pt would like statin called into pharmacy  & has email Dr. Jacobs regarding this.    Left VM : advised pt   Maame (Dr. Jacobs's nurse) notifed of above. Maame stated she will discuss w/ Dr. Jacobs & contact pt.  
5

## 2022-01-14 ENCOUNTER — INPATIENT (INPATIENT)
Facility: HOSPITAL | Age: 62
LOS: 5 days | Discharge: NURSING, REHAB OR LONG-TERM CARE | DRG: 640 | End: 2022-01-20
Attending: INTERNAL MEDICINE | Admitting: INTERNAL MEDICINE
Payer: MEDICAID

## 2022-01-14 VITALS
RESPIRATION RATE: 18 BRPM | OXYGEN SATURATION: 95 % | HEART RATE: 109 BPM | DIASTOLIC BLOOD PRESSURE: 79 MMHG | HEIGHT: 66 IN | TEMPERATURE: 99 F | SYSTOLIC BLOOD PRESSURE: 144 MMHG

## 2022-01-14 DIAGNOSIS — Z98.41 CATARACT EXTRACTION STATUS, RIGHT EYE: Chronic | ICD-10-CM

## 2022-01-14 DIAGNOSIS — E11.9 TYPE 2 DIABETES MELLITUS WITHOUT COMPLICATIONS: ICD-10-CM

## 2022-01-14 DIAGNOSIS — E78.5 HYPERLIPIDEMIA, UNSPECIFIED: ICD-10-CM

## 2022-01-14 DIAGNOSIS — Z98.890 OTHER SPECIFIED POSTPROCEDURAL STATES: Chronic | ICD-10-CM

## 2022-01-14 DIAGNOSIS — N18.6 END STAGE RENAL DISEASE: ICD-10-CM

## 2022-01-14 DIAGNOSIS — K92.0 HEMATEMESIS: ICD-10-CM

## 2022-01-14 DIAGNOSIS — Z89.512 ACQUIRED ABSENCE OF LEFT LEG BELOW KNEE: Chronic | ICD-10-CM

## 2022-01-14 DIAGNOSIS — Z29.9 ENCOUNTER FOR PROPHYLACTIC MEASURES, UNSPECIFIED: ICD-10-CM

## 2022-01-14 DIAGNOSIS — U07.1 COVID-19: ICD-10-CM

## 2022-01-14 DIAGNOSIS — Z87.448 PERSONAL HISTORY OF OTHER DISEASES OF URINARY SYSTEM: Chronic | ICD-10-CM

## 2022-01-14 DIAGNOSIS — I25.10 ATHEROSCLEROTIC HEART DISEASE OF NATIVE CORONARY ARTERY WITHOUT ANGINA PECTORIS: ICD-10-CM

## 2022-01-14 DIAGNOSIS — Z98.42 CATARACT EXTRACTION STATUS, LEFT EYE: Chronic | ICD-10-CM

## 2022-01-14 DIAGNOSIS — I10 ESSENTIAL (PRIMARY) HYPERTENSION: ICD-10-CM

## 2022-01-14 DIAGNOSIS — E87.5 HYPERKALEMIA: ICD-10-CM

## 2022-01-14 LAB
ALBUMIN SERPL ELPH-MCNC: 2.8 G/DL — LOW (ref 3.5–5)
ALP SERPL-CCNC: 109 U/L — SIGNIFICANT CHANGE UP (ref 40–120)
ALT FLD-CCNC: 14 U/L DA — SIGNIFICANT CHANGE UP (ref 10–60)
ANION GAP SERPL CALC-SCNC: 16 MMOL/L — SIGNIFICANT CHANGE UP (ref 5–17)
ANION GAP SERPL CALC-SCNC: 18 MMOL/L — HIGH (ref 5–17)
AST SERPL-CCNC: 3 U/L — LOW (ref 10–40)
BASOPHILS # BLD AUTO: 0.02 K/UL — SIGNIFICANT CHANGE UP (ref 0–0.2)
BASOPHILS NFR BLD AUTO: 0.5 % — SIGNIFICANT CHANGE UP (ref 0–2)
BILIRUB SERPL-MCNC: 0.5 MG/DL — SIGNIFICANT CHANGE UP (ref 0.2–1.2)
BUN SERPL-MCNC: 94 MG/DL — HIGH (ref 7–18)
BUN SERPL-MCNC: 98 MG/DL — HIGH (ref 7–18)
CALCIUM SERPL-MCNC: 7 MG/DL — LOW (ref 8.4–10.5)
CALCIUM SERPL-MCNC: 7.5 MG/DL — LOW (ref 8.4–10.5)
CHLORIDE SERPL-SCNC: 109 MMOL/L — HIGH (ref 96–108)
CHLORIDE SERPL-SCNC: 110 MMOL/L — HIGH (ref 96–108)
CO2 SERPL-SCNC: 11 MMOL/L — LOW (ref 22–31)
CO2 SERPL-SCNC: 14 MMOL/L — LOW (ref 22–31)
CREAT SERPL-MCNC: 23.7 MG/DL — HIGH (ref 0.5–1.3)
CREAT SERPL-MCNC: 23.8 MG/DL — HIGH (ref 0.5–1.3)
EOSINOPHIL # BLD AUTO: 0.05 K/UL — SIGNIFICANT CHANGE UP (ref 0–0.5)
EOSINOPHIL NFR BLD AUTO: 1.4 % — SIGNIFICANT CHANGE UP (ref 0–6)
GLUCOSE BLDC GLUCOMTR-MCNC: 134 MG/DL — HIGH (ref 70–99)
GLUCOSE BLDC GLUCOMTR-MCNC: 178 MG/DL — HIGH (ref 70–99)
GLUCOSE BLDC GLUCOMTR-MCNC: 27 MG/DL — CRITICAL LOW (ref 70–99)
GLUCOSE SERPL-MCNC: 193 MG/DL — HIGH (ref 70–99)
GLUCOSE SERPL-MCNC: 89 MG/DL — SIGNIFICANT CHANGE UP (ref 70–99)
HCT VFR BLD CALC: 27.2 % — LOW (ref 39–50)
HCT VFR BLD CALC: 29.3 % — LOW (ref 39–50)
HGB BLD-MCNC: 9 G/DL — LOW (ref 13–17)
HGB BLD-MCNC: 9.5 G/DL — LOW (ref 13–17)
IMM GRANULOCYTES NFR BLD AUTO: 0.3 % — SIGNIFICANT CHANGE UP (ref 0–1.5)
LIDOCAIN IGE QN: 355 U/L — SIGNIFICANT CHANGE UP (ref 73–393)
LYMPHOCYTES # BLD AUTO: 0.36 K/UL — LOW (ref 1–3.3)
LYMPHOCYTES # BLD AUTO: 9.8 % — LOW (ref 13–44)
MAGNESIUM SERPL-MCNC: 3.7 MG/DL — HIGH (ref 1.6–2.6)
MCHC RBC-ENTMCNC: 28.2 PG — SIGNIFICANT CHANGE UP (ref 27–34)
MCHC RBC-ENTMCNC: 29.2 PG — SIGNIFICANT CHANGE UP (ref 27–34)
MCHC RBC-ENTMCNC: 32.4 GM/DL — SIGNIFICANT CHANGE UP (ref 32–36)
MCHC RBC-ENTMCNC: 33.1 GM/DL — SIGNIFICANT CHANGE UP (ref 32–36)
MCV RBC AUTO: 86.9 FL — SIGNIFICANT CHANGE UP (ref 80–100)
MCV RBC AUTO: 88.3 FL — SIGNIFICANT CHANGE UP (ref 80–100)
MONOCYTES # BLD AUTO: 0.44 K/UL — SIGNIFICANT CHANGE UP (ref 0–0.9)
MONOCYTES NFR BLD AUTO: 12 % — SIGNIFICANT CHANGE UP (ref 2–14)
NEUTROPHILS # BLD AUTO: 2.78 K/UL — SIGNIFICANT CHANGE UP (ref 1.8–7.4)
NEUTROPHILS NFR BLD AUTO: 76 % — SIGNIFICANT CHANGE UP (ref 43–77)
NRBC # BLD: 0 /100 WBCS — SIGNIFICANT CHANGE UP (ref 0–0)
NRBC # BLD: 1 /100 WBCS — HIGH (ref 0–0)
PHOSPHATE SERPL-MCNC: 7.7 MG/DL — HIGH (ref 2.5–4.5)
PLATELET # BLD AUTO: 122 K/UL — LOW (ref 150–400)
PLATELET # BLD AUTO: 132 K/UL — LOW (ref 150–400)
POTASSIUM SERPL-MCNC: 6.1 MMOL/L — HIGH (ref 3.5–5.3)
POTASSIUM SERPL-MCNC: 6.2 MMOL/L — CRITICAL HIGH (ref 3.5–5.3)
POTASSIUM SERPL-SCNC: 6.1 MMOL/L — HIGH (ref 3.5–5.3)
POTASSIUM SERPL-SCNC: 6.2 MMOL/L — CRITICAL HIGH (ref 3.5–5.3)
PROT SERPL-MCNC: 6.7 G/DL — SIGNIFICANT CHANGE UP (ref 6–8.3)
RBC # BLD: 3.08 M/UL — LOW (ref 4.2–5.8)
RBC # BLD: 3.37 M/UL — LOW (ref 4.2–5.8)
RBC # FLD: 21.3 % — HIGH (ref 10.3–14.5)
RBC # FLD: 21.9 % — HIGH (ref 10.3–14.5)
SARS-COV-2 RNA SPEC QL NAA+PROBE: DETECTED
SODIUM SERPL-SCNC: 138 MMOL/L — SIGNIFICANT CHANGE UP (ref 135–145)
SODIUM SERPL-SCNC: 140 MMOL/L — SIGNIFICANT CHANGE UP (ref 135–145)
WBC # BLD: 3.66 K/UL — LOW (ref 3.8–10.5)
WBC # BLD: 3.81 K/UL — SIGNIFICANT CHANGE UP (ref 3.8–10.5)
WBC # FLD AUTO: 3.66 K/UL — LOW (ref 3.8–10.5)
WBC # FLD AUTO: 3.81 K/UL — SIGNIFICANT CHANGE UP (ref 3.8–10.5)

## 2022-01-14 PROCEDURE — 99222 1ST HOSP IP/OBS MODERATE 55: CPT

## 2022-01-14 PROCEDURE — 99285 EMERGENCY DEPT VISIT HI MDM: CPT

## 2022-01-14 RX ORDER — INSULIN HUMAN 100 [IU]/ML
5 INJECTION, SOLUTION SUBCUTANEOUS ONCE
Refills: 0 | Status: COMPLETED | OUTPATIENT
Start: 2022-01-14 | End: 2022-01-14

## 2022-01-14 RX ORDER — PANTOPRAZOLE SODIUM 20 MG/1
40 TABLET, DELAYED RELEASE ORAL EVERY 24 HOURS
Refills: 0 | Status: DISCONTINUED | OUTPATIENT
Start: 2022-01-14 | End: 2022-01-14

## 2022-01-14 RX ORDER — INSULIN GLARGINE 100 [IU]/ML
4 INJECTION, SOLUTION SUBCUTANEOUS AT BEDTIME
Refills: 0 | Status: DISCONTINUED | OUTPATIENT
Start: 2022-01-14 | End: 2022-01-20

## 2022-01-14 RX ORDER — INSULIN LISPRO 100/ML
VIAL (ML) SUBCUTANEOUS
Refills: 0 | Status: DISCONTINUED | OUTPATIENT
Start: 2022-01-14 | End: 2022-01-20

## 2022-01-14 RX ORDER — DEXTROSE 50 % IN WATER 50 %
50 SYRINGE (ML) INTRAVENOUS ONCE
Refills: 0 | Status: COMPLETED | OUTPATIENT
Start: 2022-01-14 | End: 2022-01-14

## 2022-01-14 RX ORDER — CALCIUM GLUCONATE 100 MG/ML
1 VIAL (ML) INTRAVENOUS ONCE
Refills: 0 | Status: COMPLETED | OUTPATIENT
Start: 2022-01-14 | End: 2022-01-14

## 2022-01-14 RX ORDER — ONDANSETRON 8 MG/1
4 TABLET, FILM COATED ORAL ONCE
Refills: 0 | Status: COMPLETED | OUTPATIENT
Start: 2022-01-14 | End: 2022-01-14

## 2022-01-14 RX ORDER — SODIUM ZIRCONIUM CYCLOSILICATE 10 G/10G
10 POWDER, FOR SUSPENSION ORAL EVERY 12 HOURS
Refills: 0 | Status: COMPLETED | OUTPATIENT
Start: 2022-01-14 | End: 2022-01-16

## 2022-01-14 RX ORDER — SIMVASTATIN 20 MG/1
40 TABLET, FILM COATED ORAL AT BEDTIME
Refills: 0 | Status: DISCONTINUED | OUTPATIENT
Start: 2022-01-14 | End: 2022-01-20

## 2022-01-14 RX ORDER — PANTOPRAZOLE SODIUM 20 MG/1
40 TABLET, DELAYED RELEASE ORAL
Refills: 0 | Status: DISCONTINUED | OUTPATIENT
Start: 2022-01-14 | End: 2022-01-20

## 2022-01-14 RX ORDER — INSULIN LISPRO 100/ML
VIAL (ML) SUBCUTANEOUS AT BEDTIME
Refills: 0 | Status: DISCONTINUED | OUTPATIENT
Start: 2022-01-14 | End: 2022-01-20

## 2022-01-14 RX ORDER — AMLODIPINE BESYLATE 2.5 MG/1
10 TABLET ORAL DAILY
Refills: 0 | Status: DISCONTINUED | OUTPATIENT
Start: 2022-01-14 | End: 2022-01-20

## 2022-01-14 RX ORDER — SODIUM ZIRCONIUM CYCLOSILICATE 10 G/10G
5 POWDER, FOR SUSPENSION ORAL
Refills: 0 | Status: DISCONTINUED | OUTPATIENT
Start: 2022-01-14 | End: 2022-01-14

## 2022-01-14 RX ORDER — ERYTHROPOIETIN 10000 [IU]/ML
4000 INJECTION, SOLUTION INTRAVENOUS; SUBCUTANEOUS
Refills: 0 | Status: DISCONTINUED | OUTPATIENT
Start: 2022-01-14 | End: 2022-01-20

## 2022-01-14 RX ORDER — ASPIRIN/CALCIUM CARB/MAGNESIUM 324 MG
81 TABLET ORAL DAILY
Refills: 0 | Status: DISCONTINUED | OUTPATIENT
Start: 2022-01-14 | End: 2022-01-14

## 2022-01-14 RX ORDER — ALBUTEROL 90 UG/1
2 AEROSOL, METERED ORAL EVERY 6 HOURS
Refills: 0 | Status: DISCONTINUED | OUTPATIENT
Start: 2022-01-14 | End: 2022-01-20

## 2022-01-14 RX ORDER — SEVELAMER CARBONATE 2400 MG/1
2400 POWDER, FOR SUSPENSION ORAL
Refills: 0 | Status: DISCONTINUED | OUTPATIENT
Start: 2022-01-14 | End: 2022-01-20

## 2022-01-14 RX ORDER — SODIUM ZIRCONIUM CYCLOSILICATE 10 G/10G
10 POWDER, FOR SUSPENSION ORAL ONCE
Refills: 0 | Status: COMPLETED | OUTPATIENT
Start: 2022-01-14 | End: 2022-01-14

## 2022-01-14 RX ORDER — SODIUM ZIRCONIUM CYCLOSILICATE 10 G/10G
10 POWDER, FOR SUSPENSION ORAL EVERY 12 HOURS
Refills: 0 | Status: DISCONTINUED | OUTPATIENT
Start: 2022-01-14 | End: 2022-01-14

## 2022-01-14 RX ORDER — NORTRIPTYLINE HYDROCHLORIDE 10 MG/1
10 CAPSULE ORAL AT BEDTIME
Refills: 0 | Status: DISCONTINUED | OUTPATIENT
Start: 2022-01-14 | End: 2022-01-20

## 2022-01-14 RX ORDER — SUCRALFATE 1 G
1 TABLET ORAL
Refills: 0 | Status: DISCONTINUED | OUTPATIENT
Start: 2022-01-14 | End: 2022-01-20

## 2022-01-14 RX ADMIN — Medication 1 GRAM(S): at 23:44

## 2022-01-14 RX ADMIN — INSULIN HUMAN 5 UNIT(S): 100 INJECTION, SOLUTION SUBCUTANEOUS at 19:53

## 2022-01-14 RX ADMIN — SIMVASTATIN 40 MILLIGRAM(S): 20 TABLET, FILM COATED ORAL at 22:40

## 2022-01-14 RX ADMIN — SODIUM ZIRCONIUM CYCLOSILICATE 10 GRAM(S): 10 POWDER, FOR SUSPENSION ORAL at 22:38

## 2022-01-14 RX ADMIN — Medication 50 MILLILITER(S): at 22:15

## 2022-01-14 RX ADMIN — ONDANSETRON 4 MILLIGRAM(S): 8 TABLET, FILM COATED ORAL at 12:35

## 2022-01-14 RX ADMIN — PANTOPRAZOLE SODIUM 40 MILLIGRAM(S): 20 TABLET, DELAYED RELEASE ORAL at 12:35

## 2022-01-14 RX ADMIN — NORTRIPTYLINE HYDROCHLORIDE 10 MILLIGRAM(S): 10 CAPSULE ORAL at 22:40

## 2022-01-14 RX ADMIN — SODIUM ZIRCONIUM CYCLOSILICATE 10 GRAM(S): 10 POWDER, FOR SUSPENSION ORAL at 14:16

## 2022-01-14 RX ADMIN — Medication 50 MILLILITER(S): at 19:52

## 2022-01-14 NOTE — CONSULT NOTE ADULT - ATTENDING COMMENTS
- Coffee ground emesis.  - Anemia.    In short, pt is a 61M with ESRD on HD presenting after missing dialysis with uremia, hyperkalemia and coffee ground emesis. Patient seen and examined. Reports emesis has now resolved. Refusing exam, states "I'm fine now". Chart reviewed- had EGD 1 year prior (1/2021) for same complaint, showed LA grade D esophagitis, biopsies unremarkable. Discussed potential EGD but patient refusing. Suspect likely recurring esophagitis - Coffee ground emesis.  - Anemia.    Date of service 1/14/2021  In short, pt is a 61M with ESRD on HD presenting after missing dialysis with uremia, hyperkalemia and coffee ground emesis. Patient seen and examined. Reports emesis has now resolved. Refusing exam, states "I'm fine now". Chart reviewed- had EGD 1 year prior (1/2021) for same complaint, showed LA grade D esophagitis, biopsies unremarkable. Discussed potential EGD but patient refusing. Suspect likely recurring esophagitis in setting of missed dialysis, hyperkalemia, significant uremia. Undergoing dialysis. Would treat conservatively with PPI BID, carafate slurry TID.

## 2022-01-14 NOTE — H&P ADULT - PROBLEM SELECTOR PLAN 8
IMPROVE VTE score: 3  Will manage with: SCDs     [ ] Previous VTE                                    3  [ ] Thrombophilia                                  2  [ x] Lower limb paralysis                        2  (unable to hold up >15 seconds)    [ ] Current Cancer (within 6 months)        2   [x] Immobilization > 24 hrs                    1  [ ] ICU/CCU stay > 24 hrs                      1  [x] Age > 60                                         1 - continue with home insulin Lantus 4 units  - Sliding Scale  - f/u A1C

## 2022-01-14 NOTE — H&P ADULT - PROBLEM SELECTOR PLAN 6
- BP upon eval noted to 144/79  - will continue with Norvasc 10mg with holding parameters - continue with home medications   - will hold Aspirin due to concern of GI bleed

## 2022-01-14 NOTE — ED PROVIDER NOTE - CARE PLAN
1 Principal Discharge DX:	Hematemesis with nausea  Secondary Diagnosis:	ESRD on hemodialysis  Secondary Diagnosis:	Hyperkalemia

## 2022-01-14 NOTE — H&P ADULT - PROBLEM SELECTOR PLAN 4
- continue with Simvastatin 40mg QHS - patient found to be COVID positive since 12/26  - COVID test today positive   - Isolation precautions

## 2022-01-14 NOTE — H&P ADULT - NSHPSOCIALHISTORY_GEN_ALL_CORE
Patient denies illicit drug use, ETOH or tobacco use. Patient denies illicit drug use, ETOH or tobacco use. Patient from [facility].

## 2022-01-14 NOTE — ED ADULT TRIAGE NOTE - CHIEF COMPLAINT QUOTE
jia sent from [facility] assisted living for vomiting blood today . ems reports per staff pt refused dialysis yesterday . pt awake , alert

## 2022-01-14 NOTE — H&P ADULT - ATTENDING COMMENTS
Patient is a 60 y/o male from [facility] with a past medical history of hypertension, anemia, CAD, hyperlipidemia, diabetes mellitus, GERD and adrenal insuffiencey, ESRD on HD T,Th,S reports to the ED for vomiting blood. Patient noted to  have one episode of dark vomiting and complains of nausea. Patient denies any abdominal pain, dysphagia, hematochezia or melena. Patient denies any changes to appetite or weight. Patient denies any prior upper endoscopy or prior episodes. Patient also missed dialysis yesterday. Patient, however, states he had dialysis yesterday and is scheduled for the next dialysis appointment on Saturday. Patient's dialysis schedule is T, Th, S. Patient denies any cp, sob or palpitations. Denies lower extremity swelling. Denies fevers or chills, sob or cough.     # RECURRENT EMESIS W/ HISTORY OF ESOPHAGITIS AND DUODENITIS [1/2021], HX OF GASTROPARESIS W/ CURRENT EVIDENCE OF THICKENED ESOPHAGUS ON CT SCAN [11/9]   - MONITORING HGB, PLACED ON PPI BID, CARAFATE, PRN ANTIEMETICS, - GASTROENTEROLOGY CONSULT  - CLD DIET PER GI  - PATIENT AGREES TO CONSIDER EGD, IF HE HAS REPEAT EMESIS HERE - COUNSELLED REGARDING MORTALITY RISK OF NOT PURSUING ENDOSCOPING EVALUATION.     # HYPERKALEMIA - GIVEN LOKELMA, F/U REPEAT POTASSIUM  - F/U EKG  - NEPHROLOGY CONSULT IN PROGRESS    # HX OF ANEMIA OF CKD    # SEVERE PROTEIN CALORIE MALNUTRITION, FAILURE TO THRIVE - NUTRITIONAL SUPPLEMENT  # ANEMIA OF CKD  # HLD  # ESRD ON HD TTS - W/ HX OF NONCOMPLIANCE - NEPHROLOGY CONSULT IN PROGRESS  # HTN  # DM   # PARTIALLY BLIND  # S/P LEFT BKA  # HX OF PVD  # LS SPINAL STENOSIS  # GI AND DVT PPX    SHAKIR MAC MD COVERING KUSH MAC MD

## 2022-01-14 NOTE — H&P ADULT - PROBLEM SELECTOR PLAN 2
- patient is ESRD on T,Th,S schedule with missed HD session  - noted to be Hyperkalemia in ED 6.1, asymptomatic  - EKG showing sinus tachycardia at 102bpm   - s/p 1 dose of lokelma   - Nephro Dr. Mosher consulted and will perform HD tomorrow in AM  - continue with Lokelma 10grams Q 12hrs   - monitor BMP

## 2022-01-14 NOTE — H&P ADULT - ASSESSMENT
Patient is a 60 y/o male with a past medical history of hypertension, anemia, CAD, hyperlipidemia, diabetes mellitus, GERD and adrenal insuffiencey, ESRD on HD T,Th,S reports to the ED for hematemesis. Upon evaluation in the ED, patient noted to be tachycardic to 109, but BP of 144/79, Hgb 9.5. Patient admitted for hematemesis and hyperkalemia. Patient is a 62 y/o male from [facility] with a past medical history of hypertension, anemia, CAD, hyperlipidemia, diabetes mellitus, GERD and adrenal insuffiencey, ESRD on HD T,Th,S reports to the ED for hematemesis. Upon evaluation in the ED, patient noted to be tachycardic to 109, but BP of 144/79, Hgb 9.5. Patient admitted for hematemesis and hyperkalemia. Patient found to be COVID positive.

## 2022-01-14 NOTE — CONSULT NOTE ADULT - SUBJECTIVE AND OBJECTIVE BOX
[facility] : Progress Note :: [phone], (office [phone]),   Dr Mosher / Dr Washington / Dr Young / Dr Doll / Dr Varsha TURCIOS / Dr Tello / Dr Garcia / Dr Larry qiu  _____________________________________________________________________________________________  Patient is a 61y Male whom presented to the hospital with hematemesis.   Has H/O ESRD on HD TTS at [facility], He was admitted in this hospital with COVID-19 PNA and has been getting dialysis at [facility] isolation unit, but has been missing dialysis treatments. Was last dialysed on Tuesday.  Presented to ED with vomiting  blood. Hyperkalemia 6.1 S/P LOKELMA.   Other PMHX as below.      PAST MEDICAL & SURGICAL HISTORY:  Hypertension    Adrenal insufficiency    Anemia    Glaucoma    Coronary artery disease    HLD (hyperlipidemia)    Peripheral vascular disease    Spinal stenosis of lumbosacral region    Hyperparathyroidism    Diabetes mellitus    Diabetic neuropathy    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    Osteoarthritis    Vision loss of left eye    ESRD on hemodialysis    Cataract  both eyes - hx of sx done    BPH (benign prostatic hyperplasia)    UTI (urinary tract infection)  hx of    Bladder mass  hx of    H/O hematuria    Osteoporosis    Vision loss of right eye    Depression    Chronic GERD    Osteomyelitis of vertebra    Below knee amputation status, left  2012- pt is wearing prostesis    History of right cataract extraction    History of left cataract extraction    S/P arteriovenous (AV) fistula creation  right arm brachiocephalic arteriovenous fistula on 11/08/2018    H/O hematuria  s/p bladder bx and fulguration 2/25/2020    H/O transurethral destruction of bladder lesion  2020    History of excision of mass  back mass on 03/31/2021      fish (Rash)  liver (Anaphylaxis)  No Known Drug Allergies    Home Medications Reviewed  Hospital Medications:   MEDICATIONS  (STANDING):  pantoprazole  Injectable 40 milliGRAM(s) IV Push every 24 hours    SOCIAL HISTORY:  Denies ETOh,Smoking,   FAMILY HISTORY:  Family history of cirrhosis of liver (Mother)    Family history of renal failure (Sibling)    Family history of hypertension (Sibling)    Family history of diabetes mellitus (Sibling)    History of substance abuse in sibling (Sibling)      VITALS:  T(F): 99 (01-14-22 @ 10:32), Max: 99 (01-14-22 @ 10:32)  HR: 109 (01-14-22 @ 10:32)  BP: 144/79 (01-14-22 @ 10:32)  RR: 18 (01-14-22 @ 10:32)  SpO2: 95% (01-14-22 @ 10:32)  Wt(kg): --    Height (cm): 167.6 (01-14 @ 10:32)  PHYSICAL EXAM:  Constitutional: NAD  HEENT: anicteric sclera, oropharynx clear.  Neck: No JVD  Respiratory: CTAB, no wheezes, rales or rhonchi  Cardiovascular: S1, S2, RRR  Gastrointestinal: BS+, soft, NT/ND  Extremities:  No peripheral edema  Neurological: A/O x 3, no focal deficits  Vascular Access: RUEAVF with thrill  and bruit    LABS:  01-14    140  |  110<H>  |  94<H>  ----------------------------<  89  6.1<H>   |  14<L>  |  23.70<H>    Ca    7.5<L>      14 Jan 2022 12:05  Phos  7.7     01-14  Mg     3.7     01-14    TPro  6.7  /  Alb  2.8<L>  /  TBili  0.5  /  DBili      /  AST  3<L>  /  ALT  14  /  AlkPhos  109  01-14    Creatinine Trend: 23.70 <--                        9.5    3.66  )-----------( 132      ( 14 Jan 2022 12:05 )             29.3     Urine Studies:      RADIOLOGY & ADDITIONAL STUDIES:                
   [facility] CONSULT[facility]    Patient is a 61y old  Male who presents with a chief complaint of Hematemesis/ Hyperkalemia 2/2 to Missed HD session (14 Jan 2022 16:07)    HPI:  Patient is a 60 y/o male from [facility] with a past medical history of hypertension, anemia, CAD, hyperlipidemia, diabetes mellitus, GERD and adrenal insuffiencey, ESRD on HD T,Th,S reports to the ED for vomiting blood. Patient noted to  have one episode of dark vomiting and complains of nausea. Patient denies any abdominal pain, dysphagia, hematochezia or melena. Patient denies any changes to appetite or weight. Patient denies any prior upper endoscopy or prior episodes. Patient also missed dialysis yesterday. Patient, however, states he had dialysis yesterday and is scheduled for the next dialysis appointment on Saturday. Patient's dialysis schedule is T, Th, S. Patient denies any cp, sob or palpitations. Denies lower extremity swelling. Denies fevers or chills, sob or cough.    (14 Jan 2022 16:07)    PMH/PSH:  PAST MEDICAL & SURGICAL HISTORY:  Hypertension    Adrenal insufficiency    Anemia    Glaucoma    Coronary artery disease    HLD (hyperlipidemia)    Peripheral vascular disease    Spinal stenosis of lumbosacral region    Hyperparathyroidism    Diabetes mellitus    Diabetic neuropathy    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    Osteoarthritis    Vision loss of left eye    ESRD on hemodialysis    Cataract  both eyes - hx of sx done    BPH (benign prostatic hyperplasia)    UTI (urinary tract infection)  hx of    Bladder mass  hx of    H/O hematuria    Osteoporosis    Vision loss of right eye    Depression    Chronic GERD    Osteomyelitis of vertebra    Below knee amputation status, left  2012- pt is wearing prostesis    History of right cataract extraction    History of left cataract extraction    S/P arteriovenous (AV) fistula creation  right arm brachiocephalic arteriovenous fistula on 11/08/2018    H/O hematuria  s/p bladder bx and fulguration 2/25/2020    H/O transurethral destruction of bladder lesion  2020    History of excision of mass  back mass on 03/31/2021      FH:  FAMILY HISTORY:  Family history of cirrhosis of liver (Mother)    Family history of renal failure (Sibling)    Family history of hypertension (Sibling)    Family history of diabetes mellitus (Sibling)    History of substance abuse in sibling (Sibling)        MEDS:  MEDICATIONS  (STANDING):  amLODIPine   Tablet 10 milliGRAM(s) Oral daily  epoetin beverley-epbx (RETACRIT) Injectable 4000 Unit(s) IV Push <User Schedule>  insulin glargine Injectable (LANTUS) 4 Unit(s) SubCutaneous at bedtime  insulin lispro (ADMELOG) corrective regimen sliding scale   SubCutaneous three times a day before meals  insulin lispro (ADMELOG) corrective regimen sliding scale   SubCutaneous at bedtime  nortriptyline 10 milliGRAM(s) Oral at bedtime  pantoprazole  Injectable 40 milliGRAM(s) IV Push two times a day  sevelamer carbonate 2400 milliGRAM(s) Oral three times a day with meals  simvastatin 40 milliGRAM(s) Oral at bedtime  sodium zirconium cyclosilicate 10 Gram(s) Oral every 12 hours  sucralfate suspension 1 Gram(s) Oral four times a day    MEDICATIONS  (PRN):  ALBUTerol    90 MICROgram(s) HFA Inhaler 2 Puff(s) Inhalation every 6 hours PRN Shortness of Breath and/or Wheezing    Allergies    fish (Rash)  liver (Anaphylaxis)  No Known Drug Allergies    Intolerances            CONSTITUTIONAL:  No weight loss, fever, chills, weakness or fatigue.  HEENT:  Eyes:  No visual loss, blurred vision, double vision or yellow sclerae. Ears, Nose, Throat:  No hearing loss, sneezing, congestion, runny nose or sore throat.  SKIN:  No rash or itching.  CARDIOVASCULAR:  No chest pain, chest pressure or chest discomfort. No palpitations or edema.  RESPIRATORY:  No shortness of breath, cough or sputum.  GASTROINTESTINAL:  SEE HPI  GENITOURINARY:  No dysuria, hematuria, urinary frequency  NEUROLOGICAL:  No headache, dizziness, syncope, paralysis, ataxia, numbness or tingling in the extremities. No change in bowel or bladder control.  MUSCULOSKELETAL:  No muscle, back pain, joint pain or stiffness.  HEMATOLOGIC:  No anemia, bleeding or bruising.  LYMPHATICS:  No enlarged nodes. No history of splenectomy.  PSYCHIATRIC:  No history of depression or anxiety.  ENDOCRINOLOGIC:  No reports of sweating, cold or heat intolerance. No polyuria or polydipsia.      ______________________________________________________________________  PHYSICAL EXAM:  T(C): 37.2 (01-14-22 @ 10:32), Max: 37.2 (01-14-22 @ 10:32)  HR: 109 (01-14-22 @ 10:32)  BP: 144/79 (01-14-22 @ 10:32)  RR: 18 (01-14-22 @ 10:32)  SpO2: 95% (01-14-22 @ 10:32)  Wt(kg): --      GEN: NAD, normocephalic  HENNT: no teeth or oral ulcers   CVS: S1S2+  CHEST: clear to auscultation  ABD: soft , nontender, nondistended, bowel sounds present  EXTR: no cyanosis, no clubbing, no edema  NEURO: Awake and alert; oriented x2  SKIN:  warm;  non icteric    ______________________________________________________________________  LABS:                        9.5    3.66  )-----------( 132      ( 14 Jan 2022 12:05 )             29.3     01-14    140  |  110<H>  |  94<H>  ----------------------------<  89  6.1<H>   |  14<L>  |  23.70<H>    Ca    7.5<L>      14 Jan 2022 12:05  Phos  7.7     01-14  Mg     3.7     01-14    TPro  6.7  /  Alb  2.8<L>  /  TBili  0.5  /  DBili  x   /  AST  3<L>  /  ALT  14  /  AlkPhos  109  01-14    LIVER FUNCTIONS - ( 14 Jan 2022 12:05 )  Alb: 2.8 g/dL / Pro: 6.7 g/dL / ALK PHOS: 109 U/L / ALT: 14 U/L DA / AST: 3 U/L / GGT: x             ____________________________________________    IMAGING:    < from: CT Abdomen and Pelvis No Cont (12.26.21 @ 14:07) >    ACC: [id number] EXAM:  CT ABDOMEN AND PELVIS                        ACC: [id number] EXAM:  CT CHEST                          PROCEDURE DATE:  12/26/2021          INTERPRETATION:  CLINICAL INFORMATION: Esophageal thickening, gastric   pathology, nausea,vomiting    COMPARISON: CT chest/abdomen/pelvis 11/9/2021    CONTRAST/COMPLICATIONS:  IV Contrast: NONE  Oral Contrast: NONE  Complications: None reported at time of study completion    PROCEDURE:  CT of the Chest, Abdomen and Pelvis was performed.  Sagittal and coronal reformats were performed.    FINDINGS:  CHEST:  LUNGS AND LARGE AIRWAYS: Mild pulmonary edema  PLEURA: Trace effusions.  VESSELS: Normal caliber aorta.  HEART: Moderate cardiomegaly. Trace pericardial effusion. Coronary artery   calcifications are present.  MEDIASTINUM AND PADMINI: No adenopathy.  CHEST WALL AND LOWER NECK: No masses.    ABDOMEN AND PELVIS:  LIVER: Normal.  BILE DUCTS: Nondilated.  GALLBLADDER: Normal.  SPLEEN: Normal.  PANCREAS: Not well seen.  ADRENALS: Normal.  KIDNEYS/URETERS: No hydronephrosis or urinary tract calculi. Atrophic   kidneys.    BLADDER: Underdistended limiting evaluation.  REPRODUCTIVE ORGANS: Enlarged prostate.    BOWEL: No bowel-related abnormality. Specifically, no evidence of acute   diverticulitis. Normal appendix and ileocecal region. No bowel   obstruction or bowel inflammation. Thickening and patulous esophagus   again noted as before.  PERITONEUM: Small pelvic free fluid. No free air.  VESSELS: Normal caliber aorta.  RETROPERITONEUM/LYMPH NODES: No adenopathy.  ABDOMINAL WALL: Normal.  BONES: No acute bony abnormality.    IMPRESSION:  *  Mild pulmonary edema and trace bilateral pleural effusions.  *  Moderate cardiomegaly and trace pericardial effusion.  *  Thickening and patulous esophagus again noted as before.      --- End of Report ---      < end of copied text >

## 2022-01-14 NOTE — CONSULT NOTE ADULT - ASSESSMENT
Patient is a 61y Male whom presented to the hospital with hematemesis.   Has H/O ESRD on HD TTS at [facility], He was admitted in this hospital with COVID-19 PNA and has been getting dialysis at [facility] isolation unit, but has been missing dialysis treatments. Was last dialysed on Tuesday.  Presented to ED with vomiting  blood. Hyperkalemia 6.1 S/P LOKELMA.   Other PMHX as below.    # ESRD. Patient with hyperkalemia, Hemodialysis recommended but patient has refused. discussed risk of cardiac arrest from hyperkalemia and death. He verbalized understanding.  plan for HD in AM if in agrees  keep on lokelma BID and rpt BMP this evening to monitor K+\  # hematemesis. Monitor serial CBC. hgb 9.5 EPOGEN ON hemodialysis   # Hypertension . BP controlled 
Patient is a 60 y/o male from [facility] with a past medical history of hypertension, anemia, CAD, hyperlipidemia, diabetes mellitus, GERD and adrenal insuffiencey, ESRD on HD T,Th,S. GI consult for hematemesis.  Patient reports to the ED for vomiting blood. Patient noted to  have one episode of dark vomiting, complains of nausea and intermittent difficulty swallowing. Patient denies any abdominal pain, dysphagia, hematochezia or melena. Patient denies any changes to appetite or weight. Patient denies any prior upper endoscopy or prior episodes. Patient was seen By Dr. Sierra in December for the same reason but patient was refusing any scopes. As per patient he had EGD over 5 years ago but can not recall result or the doctor that did it. Current H&H 9.5/29.3 and plt 132. CT from December showed thickening and patulous esophagus. Patient denies any ETOH or drug us.

## 2022-01-14 NOTE — H&P ADULT - PROBLEM SELECTOR PLAN 7
- continue with home insulin Lantus 4 units  - Sliding Scale  - f/u A1C - BP upon eval noted to 144/79  - will continue with Norvasc 10mg with holding parameters

## 2022-01-14 NOTE — H&P ADULT - PROBLEM SELECTOR PLAN 9
IMPROVE VTE score: 3  Will manage with: SCDs     [ ] Previous VTE                                    3  [ ] Thrombophilia                                  2  [ x] Lower limb paralysis                        2  (unable to hold up >15 seconds)    [ ] Current Cancer (within 6 months)        2   [x] Immobilization > 24 hrs                    1  [ ] ICU/CCU stay > 24 hrs                      1  [x] Age > 60                                         1

## 2022-01-14 NOTE — ED ADULT TRIAGE NOTE - ESI TRIAGE ACUITY LEVEL, MLM
"Chief Complaint   Patient presents with     Gastrophageal Reflux     Discuss use of omeprazole.     Arthritis     Arthritis both hands, all fingers are swollen and inflamed.  Decreased ROM.  Red, maybe seem a little bruised.  Worse the past week.       Initial /84 (BP Location: Left arm, Patient Position: Chair, Cuff Size: Adult Regular)  Pulse 89  Temp 98.9  F (37.2  C) (Tympanic)  Ht 5' 5\" (1.651 m)  Wt 250 lb 4.8 oz (113.5 kg)  BMI 41.65 kg/m2 Estimated body mass index is 41.65 kg/(m^2) as calculated from the following:    Height as of this encounter: 5' 5\" (1.651 m).    Weight as of this encounter: 250 lb 4.8 oz (113.5 kg).  Medication Reconciliation: complete  "
3

## 2022-01-14 NOTE — ED PROVIDER NOTE - PROGRESS NOTE DETAILS
potassium of 6.1 noted. pt given lokelma.  Case discussed with renal, Dr. Thomas, who will arrange dialysis.  Pt to be admitted to Dr. De La Torre.

## 2022-01-14 NOTE — ED ADULT NURSE NOTE - NSIMPLEMENTINTERV_GEN_ALL_ED
Implemented All Fall Risk Interventions:  [place] to call system. Call bell, personal items and telephone within reach. Instruct patient to call for assistance. Room bathroom lighting operational. Non-slip footwear when patient is off stretcher. Physically safe environment: no spills, clutter or unnecessary equipment. Stretcher in lowest position, wheels locked, appropriate side rails in place. Provide visual cue, wrist band, yellow gown, etc. Monitor gait and stability. Monitor for mental status changes and reorient to person, place, and time. Review medications for side effects contributing to fall risk. Reinforce activity limits and safety measures with patient and family.

## 2022-01-14 NOTE — CONSULT NOTE ADULT - PROBLEM SELECTOR RECOMMENDATION 9
Patients likely has a gastric or esophageal ulcer Vs an AVM in the esophagus. Currently H&H is stable. Denies dark tarry or bright blood per rectum. Patient is refusing any EGD. He just wants to eat.   -possible EGD on Tuesday if patient is agreeable with Dr. Lee   -edith cbc  -PPI BID  -clears Patients likely has a gastric or esophageal ulcer Vs an AVM in the esophagus. Currently H&H is stable. Denies dark tarry or bright blood per rectum. Patient is refusing any EGD. He just wants to eat.   -trend cbc  -PPI BID  -clears

## 2022-01-14 NOTE — ED ADULT NURSE NOTE - CHIEF COMPLAINT QUOTE
jia sent from [facility] assisted living for vomiting blood today . ems reports per staff pt refused dialysis yesterday . pt awake , alert

## 2022-01-14 NOTE — ED PROVIDER NOTE - RESPIRATORY, MLM
Patient is breathing comfortably. Lung scattered bronchi. Breath sounds clear and equal bilaterally.

## 2022-01-14 NOTE — H&P ADULT - PROBLEM SELECTOR PLAN 1
- patient presented with hematemesis  - Hgb noted to be 9.5 prior 11.2   - no acitve bleeding noted in ED, but blood around mouth   - will give clear liquid diet as tolerated, if bleeding develops will place NPO  - PPI BID IV   - Carafate suspension BID   - GI Dr. Molina consulted with plan for possible EGD  - Monitor CBC q8hrs   - Monitor signs for bleeding  - hold Aspirin and chemical DVT prophylaxis

## 2022-01-14 NOTE — H&P ADULT - NSHPPHYSICALEXAM_GEN_ALL_CORE
PHYSICAL EXAM:  GENERAL: NAD, speaks in full sentences, no signs of respiratory distress  HEAD:  Atraumatic, Normocephalic  EYES: EOMI, PERRLA, conjunctiva and sclera clear  NECK: Supple, No JVD  CHEST/LUNG: Clear to auscultation bilaterally; No wheeze; No crackles; No accessory muscles used  HEART: Regular rate and rhythm; No murmurs;   ABDOMEN: Patient refused abdominal exam    EXTREMITIES:  2+ Peripheral Pulses, No cyanosis or edema  PSYCH: AAOx3  NEUROLOGY: non-focal  SKIN: No rashes or lesions, R AVF noted

## 2022-01-14 NOTE — ED PROVIDER NOTE - OBJECTIVE STATEMENT
60 y/o male with a past medical history of hypertension, anemia, CAD, hyperlipidemia, diabetes mellitus, GERD and adrenal on dialysis reports to the ED for vomiting blood. As per triage, patient missed dialysis yesterday. Patient, however, states he had dialysis yesterday and is scheduled for the next dialysis appointment on Saturday. Patient endorses nausea and vomiting and denies diarrhea, abdominal pain and fever. Patient states he has never had something similar to this before. In ED, dried blood was noticed on sheets and on patient's shirt.

## 2022-01-14 NOTE — H&P ADULT - HISTORY OF PRESENT ILLNESS
Patient is a 60 y/o male with a past medical history of hypertension, anemia, CAD, hyperlipidemia, diabetes mellitus, GERD and adrenal insuffiencey, ESRD on HD T,Th,S reports to the ED for vomiting blood. Patient noted to  have one episode of dark vomiting and complains of nausea. Patient denies any abdominal pain, dysphagia, hematochezia or melena. Patient denies any changes to appetite or weight. Patient denies any prior upper endoscopy or prior episodes. Patient also missed dialysis yesterday. Patient, however, states he had dialysis yesterday and is scheduled for the next dialysis appointment on Saturday. Patient's dialysis schedule is T, Th, S. Patient denies any cp, sob or palpitations. Denies lower extremity swelling. Denies fevers or chills, sob or cough.    Patient is a 60 y/o male from [facility] with a past medical history of hypertension, anemia, CAD, hyperlipidemia, diabetes mellitus, GERD and adrenal insuffiencey, ESRD on HD T,Th,S reports to the ED for vomiting blood. Patient noted to  have one episode of dark vomiting and complains of nausea. Patient denies any abdominal pain, dysphagia, hematochezia or melena. Patient denies any changes to appetite or weight. Patient denies any prior upper endoscopy or prior episodes. Patient also missed dialysis yesterday. Patient, however, states he had dialysis yesterday and is scheduled for the next dialysis appointment on Saturday. Patient's dialysis schedule is T, Th, S. Patient denies any cp, sob or palpitations. Denies lower extremity swelling. Denies fevers or chills, sob or cough.

## 2022-01-14 NOTE — H&P ADULT - PROBLEM SELECTOR PLAN 3
- patient noted to be on HD T, Th, Sat  - will get HD session tomorrow in AM as per Dr. Cruz  - continue with sevelamer and lokelma  - Neprology Dr. Mosher consulted

## 2022-01-14 NOTE — H&P ADULT - PROBLEM SELECTOR PLAN 5
- continue with home medications   - will hold Aspirin due to concern of GI bleed - continue with Simvastatin 40mg QHS

## 2022-01-14 NOTE — H&P ADULT - NSHPREVIEWOFSYSTEMS_GEN_ALL_CORE
CONSTITUTIONAL: + decreased appetite, generalized weakness.  No fever, weight loss, or fatigue  EYES: No eye pain, visual disturbances, or discharge  ENT:  No difficulty hearing, tinnitus, vertigo; No sinus or throat pain  NECK: No pain or stiffness  RESPIRATORY: No cough, wheezing, chills or hemoptysis; No Shortness of Breath  CARDIOVASCULAR: No chest pain, palpitations, passing out, dizziness, or leg swelling  GASTROINTESTINAL: No abdominal or epigastric pain. Has nausea, vomiting, 1 episode of hematemesis; No diarrhea or constipation. No melena or hematochezia.  GENITOURINARY: No dysuria, frequency, hematuria, or incontinence  NEUROLOGICAL: No headaches, memory loss, loss of strength, numbness, or tremors  SKIN: No skin lesions noted   LYMPH Nodes: No enlarged glands  ENDOCRINE: No heat or cold intolerance; No hair loss  MUSCULOSKELETAL: No joint pain or swelling; No muscle, back, No extremity pain  PSYCHIATRIC: No depression, anxiety, mood swings, or difficulty sleeping  HEME/LYMPH: No easy bruising, or bleeding gums  ALLERGY AND IMMUNOLOGIC: No hives or eczema

## 2022-01-15 LAB
ALBUMIN SERPL ELPH-MCNC: 2.6 G/DL — LOW (ref 3.5–5)
ALP SERPL-CCNC: 72 U/L — SIGNIFICANT CHANGE UP (ref 40–120)
ALT FLD-CCNC: 11 U/L DA — SIGNIFICANT CHANGE UP (ref 10–60)
ANION GAP SERPL CALC-SCNC: 16 MMOL/L — SIGNIFICANT CHANGE UP (ref 5–17)
AST SERPL-CCNC: <3 U/L — LOW (ref 10–40)
BILIRUB SERPL-MCNC: 0.5 MG/DL — SIGNIFICANT CHANGE UP (ref 0.2–1.2)
BUN SERPL-MCNC: 103 MG/DL — HIGH (ref 7–18)
CALCIUM SERPL-MCNC: 7 MG/DL — LOW (ref 8.4–10.5)
CHLORIDE SERPL-SCNC: 105 MMOL/L — SIGNIFICANT CHANGE UP (ref 96–108)
CO2 SERPL-SCNC: 12 MMOL/L — LOW (ref 22–31)
CREAT SERPL-MCNC: 24.9 MG/DL — HIGH (ref 0.5–1.3)
GLUCOSE BLDC GLUCOMTR-MCNC: 195 MG/DL — HIGH (ref 70–99)
GLUCOSE BLDC GLUCOMTR-MCNC: 233 MG/DL — HIGH (ref 70–99)
GLUCOSE BLDC GLUCOMTR-MCNC: 247 MG/DL — HIGH (ref 70–99)
GLUCOSE BLDC GLUCOMTR-MCNC: 255 MG/DL — HIGH (ref 70–99)
GLUCOSE SERPL-MCNC: 255 MG/DL — HIGH (ref 70–99)
HCT VFR BLD CALC: 26.7 % — LOW (ref 39–50)
HGB BLD-MCNC: 8.9 G/DL — LOW (ref 13–17)
MAGNESIUM SERPL-MCNC: 3.6 MG/DL — HIGH (ref 1.6–2.6)
MCHC RBC-ENTMCNC: 29.1 PG — SIGNIFICANT CHANGE UP (ref 27–34)
MCHC RBC-ENTMCNC: 33.3 GM/DL — SIGNIFICANT CHANGE UP (ref 32–36)
MCV RBC AUTO: 87.3 FL — SIGNIFICANT CHANGE UP (ref 80–100)
NRBC # BLD: 0 /100 WBCS — SIGNIFICANT CHANGE UP (ref 0–0)
PHOSPHATE SERPL-MCNC: 7.8 MG/DL — HIGH (ref 2.5–4.5)
PLATELET # BLD AUTO: 117 K/UL — LOW (ref 150–400)
POTASSIUM SERPL-MCNC: 5.2 MMOL/L — SIGNIFICANT CHANGE UP (ref 3.5–5.3)
POTASSIUM SERPL-SCNC: 5.2 MMOL/L — SIGNIFICANT CHANGE UP (ref 3.5–5.3)
PROT SERPL-MCNC: 6.2 G/DL — SIGNIFICANT CHANGE UP (ref 6–8.3)
RBC # BLD: 3.06 M/UL — LOW (ref 4.2–5.8)
RBC # FLD: 21.5 % — HIGH (ref 10.3–14.5)
SODIUM SERPL-SCNC: 133 MMOL/L — LOW (ref 135–145)
WBC # BLD: 3.02 K/UL — LOW (ref 3.8–10.5)
WBC # FLD AUTO: 3.02 K/UL — LOW (ref 3.8–10.5)

## 2022-01-15 PROCEDURE — 99232 SBSQ HOSP IP/OBS MODERATE 35: CPT

## 2022-01-15 RX ORDER — DIPHENHYDRAMINE HCL 50 MG
25 CAPSULE ORAL EVERY 12 HOURS
Refills: 0 | Status: COMPLETED | OUTPATIENT
Start: 2022-01-15 | End: 2022-01-20

## 2022-01-15 RX ORDER — ACETAMINOPHEN 500 MG
650 TABLET ORAL EVERY 6 HOURS
Refills: 0 | Status: DISCONTINUED | OUTPATIENT
Start: 2022-01-15 | End: 2022-01-20

## 2022-01-15 RX ORDER — CHLORHEXIDINE GLUCONATE 213 G/1000ML
1 SOLUTION TOPICAL DAILY
Refills: 0 | Status: DISCONTINUED | OUTPATIENT
Start: 2022-01-15 | End: 2022-01-20

## 2022-01-15 RX ORDER — METOCLOPRAMIDE HCL 10 MG
5 TABLET ORAL EVERY 8 HOURS
Refills: 0 | Status: DISCONTINUED | OUTPATIENT
Start: 2022-01-15 | End: 2022-01-20

## 2022-01-15 RX ADMIN — AMLODIPINE BESYLATE 10 MILLIGRAM(S): 2.5 TABLET ORAL at 07:01

## 2022-01-15 RX ADMIN — SIMVASTATIN 40 MILLIGRAM(S): 20 TABLET, FILM COATED ORAL at 21:23

## 2022-01-15 RX ADMIN — NORTRIPTYLINE HYDROCHLORIDE 10 MILLIGRAM(S): 10 CAPSULE ORAL at 21:23

## 2022-01-15 RX ADMIN — Medication 1 GRAM(S): at 06:45

## 2022-01-15 RX ADMIN — PANTOPRAZOLE SODIUM 40 MILLIGRAM(S): 20 TABLET, DELAYED RELEASE ORAL at 06:45

## 2022-01-15 RX ADMIN — SODIUM ZIRCONIUM CYCLOSILICATE 10 GRAM(S): 10 POWDER, FOR SUSPENSION ORAL at 06:45

## 2022-01-15 RX ADMIN — SEVELAMER CARBONATE 2400 MILLIGRAM(S): 2400 POWDER, FOR SUSPENSION ORAL at 08:26

## 2022-01-15 RX ADMIN — Medication 5 MILLIGRAM(S): at 21:37

## 2022-01-15 NOTE — PROGRESS NOTE ADULT - SUBJECTIVE AND OBJECTIVE BOX
`Patient is a 61y old  Male who presents with a chief complaint of Hematemesis/ Hyperkalemia 2/2 to Missed HD session (14 Jan 2022 18:51)    PATIENT IS SEEN AND EXAMINED IN MEDICAL FLOOR.    MARIIT [    ]    JEREMY [   ]      GT [   ]    ALLERGIES:  fish (Rash)  liver (Anaphylaxis)  No Known Drug Allergies      Daily     Daily     VITALS:    Vital Signs Last 24 Hrs  T(C): 36.4 (15 Deep 2022 06:52), Max: 36.7 (14 Jan 2022 21:20)  T(F): 97.6 (15 Deep 2022 06:52), Max: 98 (14 Jan 2022 21:20)  HR: 101 (15 Deep 2022 06:52) (100 - 101)  BP: 138/76 (15 Deep 2022 06:52) (131/69 - 138/76)  BP(mean): --  RR: 18 (15 Deep 2022 06:52) (18 - 18)  SpO2: 98% (15 Deep 2022 06:52) (97% - 98%)    LABS:    CBC Full  -  ( 14 Jan 2022 20:16 )  WBC Count : 3.81 K/uL  RBC Count : 3.08 M/uL  Hemoglobin : 9.0 g/dL  Hematocrit : 27.2 %  Platelet Count - Automated : 122 K/uL  Mean Cell Volume : 88.3 fl  Mean Cell Hemoglobin : 29.2 pg  Mean Cell Hemoglobin Concentration : 33.1 gm/dL  Auto Neutrophil # : x  Auto Lymphocyte # : x  Auto Monocyte # : x  Auto Eosinophil # : x  Auto Basophil # : x  Auto Neutrophil % : x  Auto Lymphocyte % : x  Auto Monocyte % : x  Auto Eosinophil % : x  Auto Basophil % : x      01-14    138  |  109<H>  |  98<H>  ----------------------------<  193<H>  6.2<HH>   |  11<L>  |  23.80<H>    Ca    7.0<L>      14 Jan 2022 20:16  Phos  7.7     01-14  Mg     3.7     01-14    TPro  6.7  /  Alb  2.8<L>  /  TBili  0.5  /  DBili  x   /  AST  3<L>  /  ALT  14  /  AlkPhos  109  01-14    CAPILLARY BLOOD GLUCOSE      POCT Blood Glucose.: 255 mg/dL (15 Deep 2022 11:05)  POCT Blood Glucose.: 195 mg/dL (15 Deep 2022 07:38)  POCT Blood Glucose.: 178 mg/dL (14 Jan 2022 23:38)  POCT Blood Glucose.: 134 mg/dL (14 Jan 2022 22:29)  POCT Blood Glucose.: 27 mg/dL (14 Jan 2022 22:10)        LIVER FUNCTIONS - ( 14 Jan 2022 12:05 )  Alb: 2.8 g/dL / Pro: 6.7 g/dL / ALK PHOS: 109 U/L / ALT: 14 U/L DA / AST: 3 U/L / GGT: x           Creatinine Trend: 23.80<--, 23.70<--, 11.60<--, 14.20<--, 11.90<--  I&O's Summary          .Blood Blood-Peripheral  12-23 @ 20:58   No Growth Final  --  --      .Blood Blood-Peripheral  12-23 @ 20:56   No Growth Final  --  --          MEDICATIONS:    MEDICATIONS  (STANDING):  amLODIPine   Tablet 10 milliGRAM(s) Oral daily  epoetin beverley-epbx (RETACRIT) Injectable 4000 Unit(s) IV Push <User Schedule>  insulin glargine Injectable (LANTUS) 4 Unit(s) SubCutaneous at bedtime  insulin lispro (ADMELOG) corrective regimen sliding scale   SubCutaneous three times a day before meals  insulin lispro (ADMELOG) corrective regimen sliding scale   SubCutaneous at bedtime  nortriptyline 10 milliGRAM(s) Oral at bedtime  pantoprazole  Injectable 40 milliGRAM(s) IV Push two times a day  sevelamer carbonate 2400 milliGRAM(s) Oral three times a day with meals  simvastatin 40 milliGRAM(s) Oral at bedtime  sodium zirconium cyclosilicate 10 Gram(s) Oral every 12 hours  sucralfate suspension 1 Gram(s) Oral four times a day      MEDICATIONS  (PRN):  ALBUTerol    90 MICROgram(s) HFA Inhaler 2 Puff(s) Inhalation every 6 hours PRN Shortness of Breath and/or Wheezing        REVIEW OF SYSTEMS:                           ALL ROS DONE [ X   ]      CONSTITUTIONAL:  LETHARGIC [   ], FEVER [   ], UNRESPONSIVE [   ]  CVS:  CP  [   ], SOB, [   ], PALPITATIONS [   ], DIZZYNESS [   ]  RS: COUGH [   ], SPUTUM [   ]  GI: ABDOMINAL PAIN [   ], NAUSEA [   ], VOMITINGS [   ], DIARRHEA [   ], CONSTIPATION [   ]  :  DYSURIA [   ], NOCTURIA [   ], INCREASED FREQUENCY [   ], DRIBLING [   ],  SKELETAL: PAINFUL JOINTS [   ], SWOLLEN JOINTS [   ], NECK ACHE [   ], LOW BACK ACHE [   ],  SKIN : ULCERS [   ], RASH [   ], ITCHING [   ]  CNS: HEAD ACHE [   ], DOUBLE VISION [   ], BLURRED VISION [   ], AMS / CONFUSION [   ], SEIZURES [   ], WEAKNESS [   ],TINGLING / NUMBNESS [   ]      PHYSICAL EXAMINATION:    GENERAL APPEARANCE: NO DISTRESS  HEENT:  NO PALLOR, NO  JVD,  NO   NODES, NECK SUPPLE  CVS: S1 +, S2 +,   RS: AEEB,  OCCASIONAL  RALES +,   NO RONCHI  ABD: SOFT, NT, NO, BS +  EXT: NO PE  SKIN: WARM,   SKELETAL:  ROM ACCEPTABLE  CNS:  AAO X    ,   DEFICITS        RADIOLOGY :          ASSESSMENT :     Hematemesis    No pertinent past medical history    Hypertension    Adrenal insufficiency    CKD (chronic kidney disease)    Anemia    Glaucoma    Coronary artery disease    HLD (hyperlipidemia)    Peripheral vascular disease    Spinal stenosis of lumbosacral region    Hyperparathyroidism    Diabetes mellitus    Diabetic neuropathy    Contracture of hand    Osteoarthritis    Osteoporosis    Vision loss of left eye    ESRD on hemodialysis    Cataract    BPH (benign prostatic hyperplasia)    UTI (urinary tract infection)    Bladder mass    H/O hematuria    Osteoporosis    Vision loss of right eye    Depression    Chronic GERD    Osteomyelitis of vertebra    No significant past surgical history    Below knee amputation status, left    History of right cataract extraction    History of left cataract extraction    S/P arteriovenous (AV) fistula creation    H/O hematuria    H/O transurethral destruction of bladder lesion    History of excision of mass        PLAN:  HPI:  Patient is a 60 y/o male from [facility] with a past medical history of hypertension, anemia, CAD, hyperlipidemia, diabetes mellitus, GERD and adrenal insuffiencey, ESRD on HD T,Th,S reports to the ED for vomiting blood. Patient noted to  have one episode of dark vomiting and complains of nausea. Patient denies any abdominal pain, dysphagia, hematochezia or melena. Patient denies any changes to appetite or weight. Patient denies any prior upper endoscopy or prior episodes. Patient also missed dialysis yesterday. Patient, however, states he had dialysis yesterday and is scheduled for the next dialysis appointment on Saturday. Patient's dialysis schedule is T, Th, S. Patient denies any cp, sob or palpitations. Denies lower extremity swelling. Denies fevers or chills, sob or cough.    (14 Jan 2022 16:07)      # RECURRENT EMESIS W/ HISTORY OF ESOPHAGITIS AND DUODENITIS [1/2021], HX OF GASTROPARESIS W/ CURRENT EVIDENCE OF THICKENED ESOPHAGUS ON CT SCAN [11/9]   - MONITORING HGB, PLACED ON PPI BID, CARAFATE, PRN ANTIEMETICS, - GASTROENTEROLOGY CONSULT  - CLD DIET PER GI  - PATIENT AGREES TO CONSIDER EGD, IF HE HAS REPEAT EMESIS HERE - COUNSELLED REGARDING MORTALITY RISK OF NOT PURSUING ENDOSCOPING EVALUATION.     # HYPERKALEMIA - GIVEN LOKELMA, F/U REPEAT POTASSIUM  - F/U EKG  - NEPHROLOGY CONSULT IN PROGRESS    # HX OF ANEMIA OF CKD    # SEVERE PROTEIN CALORIE MALNUTRITION, FAILURE TO THRIVE - NUTRITIONAL SUPPLEMENT  # ANEMIA OF CKD  # HLD  # ESRD ON HD TTS - W/ HX OF NONCOMPLIANCE - NEPHROLOGY CONSULT IN PROGRESS  # HTN  # DM   # PARTIALLY BLIND  # S/P LEFT BKA  # HX OF PVD  # LS SPINAL STENOSIS  # GI AND DVT PPX    SHAKIR MAC MD COVERING KUSH MAC MD .       `Patient is a 61y old  Male who presents with a chief complaint of Hematemesis/ Hyperkalemia 2/2 to Missed HD session (14 Jan 2022 18:51)    PATIENT IS SEEN AND EXAMINED IN MEDICAL FLOOR.    MARIIT [    ]    JEREMY [   ]      GT [   ]    ALLERGIES:  fish (Rash)  liver (Anaphylaxis)  No Known Drug Allergies      Daily     Daily     VITALS:    Vital Signs Last 24 Hrs  T(C): 36.4 (15 Deep 2022 06:52), Max: 36.7 (14 Jan 2022 21:20)  T(F): 97.6 (15 Deep 2022 06:52), Max: 98 (14 Jan 2022 21:20)  HR: 101 (15 Deep 2022 06:52) (100 - 101)  BP: 138/76 (15 Deep 2022 06:52) (131/69 - 138/76)  BP(mean): --  RR: 18 (15 Deep 2022 06:52) (18 - 18)  SpO2: 98% (15 Deep 2022 06:52) (97% - 98%)    LABS:    CBC Full  -  ( 14 Jan 2022 20:16 )  WBC Count : 3.81 K/uL  RBC Count : 3.08 M/uL  Hemoglobin : 9.0 g/dL  Hematocrit : 27.2 %  Platelet Count - Automated : 122 K/uL  Mean Cell Volume : 88.3 fl  Mean Cell Hemoglobin : 29.2 pg  Mean Cell Hemoglobin Concentration : 33.1 gm/dL  Auto Neutrophil # : x  Auto Lymphocyte # : x  Auto Monocyte # : x  Auto Eosinophil # : x  Auto Basophil # : x  Auto Neutrophil % : x  Auto Lymphocyte % : x  Auto Monocyte % : x  Auto Eosinophil % : x  Auto Basophil % : x      01-14    138  |  109<H>  |  98<H>  ----------------------------<  193<H>  6.2<HH>   |  11<L>  |  23.80<H>    Ca    7.0<L>      14 Jan 2022 20:16  Phos  7.7     01-14  Mg     3.7     01-14    TPro  6.7  /  Alb  2.8<L>  /  TBili  0.5  /  DBili  x   /  AST  3<L>  /  ALT  14  /  AlkPhos  109  01-14    CAPILLARY BLOOD GLUCOSE      POCT Blood Glucose.: 255 mg/dL (15 Deep 2022 11:05)  POCT Blood Glucose.: 195 mg/dL (15 Deep 2022 07:38)  POCT Blood Glucose.: 178 mg/dL (14 Jan 2022 23:38)  POCT Blood Glucose.: 134 mg/dL (14 Jan 2022 22:29)  POCT Blood Glucose.: 27 mg/dL (14 Jan 2022 22:10)        LIVER FUNCTIONS - ( 14 Jan 2022 12:05 )  Alb: 2.8 g/dL / Pro: 6.7 g/dL / ALK PHOS: 109 U/L / ALT: 14 U/L DA / AST: 3 U/L / GGT: x           Creatinine Trend: 23.80<--, 23.70<--, 11.60<--, 14.20<--, 11.90<--  I&O's Summary          .Blood Blood-Peripheral  12-23 @ 20:58   No Growth Final  --  --      .Blood Blood-Peripheral  12-23 @ 20:56   No Growth Final  --  --          MEDICATIONS:    MEDICATIONS  (STANDING):  amLODIPine   Tablet 10 milliGRAM(s) Oral daily  epoetin beverley-epbx (RETACRIT) Injectable 4000 Unit(s) IV Push <User Schedule>  insulin glargine Injectable (LANTUS) 4 Unit(s) SubCutaneous at bedtime  insulin lispro (ADMELOG) corrective regimen sliding scale   SubCutaneous three times a day before meals  insulin lispro (ADMELOG) corrective regimen sliding scale   SubCutaneous at bedtime  nortriptyline 10 milliGRAM(s) Oral at bedtime  pantoprazole  Injectable 40 milliGRAM(s) IV Push two times a day  sevelamer carbonate 2400 milliGRAM(s) Oral three times a day with meals  simvastatin 40 milliGRAM(s) Oral at bedtime  sodium zirconium cyclosilicate 10 Gram(s) Oral every 12 hours  sucralfate suspension 1 Gram(s) Oral four times a day      MEDICATIONS  (PRN):  ALBUTerol    90 MICROgram(s) HFA Inhaler 2 Puff(s) Inhalation every 6 hours PRN Shortness of Breath and/or Wheezing        REVIEW OF SYSTEMS:                           ALL ROS DONE [ X   ]      CONSTITUTIONAL:  LETHARGIC [   ], FEVER [   ], UNRESPONSIVE [   ]  CVS:  CP  [   ], SOB, [   ], PALPITATIONS [   ], DIZZYNESS [   ]  RS: COUGH [   ], SPUTUM [   ]  GI: ABDOMINAL PAIN [   ], NAUSEA [   ], VOMITINGS [   ], DIARRHEA [   ], CONSTIPATION [   ]  :  DYSURIA [   ], NOCTURIA [   ], INCREASED FREQUENCY [   ], DRIBLING [   ],  SKELETAL: PAINFUL JOINTS [   ], SWOLLEN JOINTS [   ], NECK ACHE [   ], LOW BACK ACHE [   ],  SKIN : ULCERS [   ], RASH [   ], ITCHING [   ]  CNS: HEAD ACHE [   ], DOUBLE VISION [   ], BLURRED VISION [   ], AMS / CONFUSION [   ], SEIZURES [   ], WEAKNESS [   ],TINGLING / NUMBNESS [   ]      PHYSICAL EXAMINATION:    GENERAL APPEARANCE: NO DISTRESS  HEENT:  NO PALLOR, NO  JVD,  NO   NODES, NECK SUPPLE  CVS: S1 +, S2 +,   RS: AEEB,  OCCASIONAL  RALES +,   NO RONCHI  ABD: SOFT, NT, NO, BS +  EXT: NO PE  SKIN: WARM,   SKELETAL:  ROM REDUCED AT CERVICAL & LS SPINE   CNS:  AAO X  2-3 , MOVES ALL LIMBS         RADIOLOGY :    < from: CT Abdomen and Pelvis No Cont (12.26.21 @ 14:07) >  IMPRESSION:  *  Mild pulmonary edema and trace bilateral pleural effusions.  *  Moderate cardiomegaly and trace pericardial effusion.  *  Thickening and patulous esophagus again noted as before.        < end of copied text >  < from: CT Chest No Cont (12.26.21 @ 14:07) >  IMPRESSION:  *  Mild pulmonary edema and trace bilateral pleural effusions.  *  Moderate cardiomegaly and trace pericardial effusion.  *  Thickening and patulous esophagus again noted as before.      < end of copied text >        ASSESSMENT :     Hematemesis      Hypertension    Adrenal insufficiency    CKD (chronic kidney disease)    Anemia    Glaucoma    Coronary artery disease    HLD (hyperlipidemia)    Peripheral vascular disease    Spinal stenosis of lumbosacral region    Hyperparathyroidism    Diabetes mellitus    Diabetic neuropathy    Contracture of hand    Osteoarthritis    Osteoporosis    Vision loss of left eye    ESRD on hemodialysis    Cataract    BPH (benign prostatic hyperplasia)    UTI (urinary tract infection)    Bladder mass    H/O hematuria    Osteoporosis    Vision loss of right eye    Depression    Chronic GERD    Osteomyelitis of vertebra    Below knee amputation status, left    History of right cataract extraction    History of left cataract extraction    S/P arteriovenous (AV) fistula creation    H/O hematuria    H/O transurethral destruction of bladder lesion    History of excision of mass        PLAN:  HPI:  Patient is a 62 y/o male from [facility] with a past medical history of hypertension, anemia, CAD, hyperlipidemia, diabetes mellitus, GERD and adrenal insuffiencey, ESRD on HD T,Th,S reports to the ED for vomiting blood. Patient noted to  have one episode of dark vomiting and complains of nausea. Patient denies any abdominal pain, dysphagia, hematochezia or melena. Patient denies any changes to appetite or weight. Patient denies any prior upper endoscopy or prior episodes. Patient also missed dialysis yesterday. Patient, however, states he had dialysis yesterday and is scheduled for the next dialysis appointment on Saturday. Patient's dialysis schedule is T, Th, S. Patient denies any cp, sob or palpitations. Denies lower extremity swelling. Denies fevers or chills, sob or cough.    (14 Jan 2022 16:07)    # ADDED IV. REGLAN AND BENADRYL FOR GASTROPARESIS     AND VOMITING    # RECURRENT EMESIS W/ HISTORY OF ESOPHAGITIS AND DUODENITIS [1/2021], HX OF GASTROPARESIS W/ CURRENT EVIDENCE OF THICKENED ESOPHAGUS ON CT SCAN [11/9]   - MONITORING HGB, PLACED ON PPI BID, CARAFATE, PRN ANTIEMETICS, - GASTROENTEROLOGY CONSULT  - CLD DIET PER GI  - PATIENT AGREES TO CONSIDER EGD, IF HE HAS REPEAT EMESIS HERE - COUNSELLED REGARDING MORTALITY RISK OF NOT PURSUING ENDOSCOPING EVALUATION.     # HYPERKALEMIA - GIVEN LOKELMA, F/U REPEAT POTASSIUM  - F/U EKG  - NEPHROLOGY CONSULT IN PROGRESS    # HX OF ANEMIA OF CKD    # SEVERE PROTEIN CALORIE MALNUTRITION, FAILURE TO THRIVE - NUTRITIONAL SUPPLEMENT  # ANEMIA OF CKD  # HLD  # ESRD ON HD TTS - W/ HX OF NONCOMPLIANCE - NEPHROLOGY CONSULT IN PROGRESS  # HTN  # DM   # PARTIALLY BLIND  # S/P LEFT BKA  # HX OF PVD  # LS SPINAL STENOSIS  # GI AND DVT PPX    DR. KUSH AMC MD .

## 2022-01-15 NOTE — PATIENT PROFILE ADULT - TOBACCO USE
How Severe Is Your Skin Lesion?: mild Has Your Skin Lesion Been Treated?: not been treated Is This A New Presentation, Or A Follow-Up?: Skin Lesion Unknown if ever smoked

## 2022-01-15 NOTE — PROGRESS NOTE ADULT - SUBJECTIVE AND OBJECTIVE BOX
Patient is a 61y Male with  ESRD ON  HD,  REFUSED TO  GO FOR  HD  YESTERDAY   DID NOT WANT TO G  THIS AM  AS  WELL    SPOKE  WITH  HIM AT  LENGTH    EXPLAINED TO HIM THAT HIS K  IS HIGH  AND   IT WILL POSE A  BIG RISK  TO HIS HEART    AGREES TO GO FOR  HD  BUT WANTS FOOD FIRST      fish (Rash)  liver (Anaphylaxis)  No Known Drug Allergies    Hospital Medications:   MEDICATIONS  (STANDING):  amLODIPine   Tablet 10 milliGRAM(s) Oral daily  epoetin beverley-epbx (RETACRIT) Injectable 4000 Unit(s) IV Push <User Schedule>  insulin glargine Injectable (LANTUS) 4 Unit(s) SubCutaneous at bedtime  insulin lispro (ADMELOG) corrective regimen sliding scale   SubCutaneous three times a day before meals  insulin lispro (ADMELOG) corrective regimen sliding scale   SubCutaneous at bedtime  nortriptyline 10 milliGRAM(s) Oral at bedtime  pantoprazole  Injectable 40 milliGRAM(s) IV Push two times a day  sevelamer carbonate 2400 milliGRAM(s) Oral three times a day with meals  simvastatin 40 milliGRAM(s) Oral at bedtime  sodium zirconium cyclosilicate 10 Gram(s) Oral every 12 hours  sucralfate suspension 1 Gram(s) Oral four times a day    REVIEW OF SYSTEMS:   IS RESTING COMFORTABLY    DOESNT  APPEAR SOB   NO FEVER/CHILLS   FEELS HUNGRY   NO N/V    NO VOMITTING       VITALS:  T(F): 97.6 (01-15-22 @ 06:52), Max: 98 (01-14-22 @ 21:20)  HR: 101 (01-15-22 @ 06:52)  BP: 138/76 (01-15-22 @ 06:52)  RR: 18 (01-15-22 @ 06:52)  SpO2: 98% (01-15-22 @ 06:52)  Wt(kg): --      PHYSICAL EXAM:  Constitutional: NAD  HEENT: CONJ PINK  Neck: No JVD  Respiratory: HAS FEW  CRACKLES AT  LUNG  BASES POST  Cardiovascular: S1, S2, RRR  Gastrointestinal: BS+, soft, NT/ND  Extremities:  No peripheral edema R,  L-  BKA  Neurological: A/O x 3,   :  No [place].   Vascular Access: R  UPPER ARM  AVF ,  WORKING WELL    LABS:  01-14    138  |  109<H>  |  98<H>  ----------------------------<  193<H>  6.2<HH>   |  11<L>  |  23.80<H>    Ca    7.0<L>      14 Jan 2022 20:16  Phos  7.7     01-14  Mg     3.7     01-14    TPro  6.7  /  Alb  2.8<L>  /  TBili  0.5  /  DBili      /  AST  3<L>  /  ALT  14  /  AlkPhos  109  01-14    Creatinine Trend: 23.80 <--, 23.70 <--                        9.0    3.81  )-----------( 122      ( 14 Jan 2022 20:16 )             27.2     Urine Studies:      RADIOLOGY & ADDITIONAL STUDIES:

## 2022-01-15 NOTE — PROGRESS NOTE ADULT - SUBJECTIVE AND OBJECTIVE BOX
Interval:  Pt reports no further emesis. Denies any complaints - no n/v/d/c, melena, hematochezia, or other issues.      MEDICATIONS:  ALBUTerol    90 MICROgram(s) HFA Inhaler 2 Puff(s) Inhalation every 6 hours PRN  amLODIPine   Tablet 10 milliGRAM(s) Oral daily  diphenhydrAMINE Injectable 25 milliGRAM(s) IV Push every 12 hours  epoetin beverley-epbx (RETACRIT) Injectable 4000 Unit(s) IV Push <User Schedule>  insulin glargine Injectable (LANTUS) 4 Unit(s) SubCutaneous at bedtime  insulin lispro (ADMELOG) corrective regimen sliding scale   SubCutaneous three times a day before meals  insulin lispro (ADMELOG) corrective regimen sliding scale   SubCutaneous at bedtime  metoclopramide Injectable 5 milliGRAM(s) IV Push every 8 hours  nortriptyline 10 milliGRAM(s) Oral at bedtime  pantoprazole  Injectable 40 milliGRAM(s) IV Push two times a day  sevelamer carbonate 2400 milliGRAM(s) Oral three times a day with meals  simvastatin 40 milliGRAM(s) Oral at bedtime  sodium zirconium cyclosilicate 10 Gram(s) Oral every 12 hours  sucralfate suspension 1 Gram(s) Oral four times a day      ALLERGIES:  fish (Rash)  liver (Anaphylaxis)  No Known Drug Allergies      SOCIAL HISTORY:   Patient denies illicit drug use, ETOH or tobacco use. Patient from [facility]. (14 Jan 2022 16:07)    REVIEW OF SYSTEMS:  CONSTITUTIONAL: No weakness, fevers or chills.  EYES/ENT: No visual changes;  No vertigo or throat pain.  NECK: No pain or stiffness.  RESPIRATORY: No cough, wheezing, hemoptysis; No shortness of breath.  CARDIOVASCULAR: No chest pain or palpitations.  GASTROINTESTINAL: As per HPI.   GENITOURINARY: No dysuria, frequency or hematuria.  NEUROLOGICAL: No numbness or weakness.  SKIN: No itching, rashes.      PHYSICAL EXAM:  VITAL SIGNS:  T(C): 37.1 (01-15-22 @ 13:58), Max: 37.1 (01-15-22 @ 13:58)  HR: 100 (01-15-22 @ 13:58) (100 - 101)  BP: 145/74 (01-15-22 @ 13:58) (131/69 - 145/74)  RR: 18 (01-15-22 @ 13:58) (18 - 18)  SpO2: 99% (01-15-22 @ 13:58) (97% - 99%)  I/Os:     GENERAL: NAD, lying in bed comfortably.  HEAD:  Atraumatic, Normocephalic.  EYES: EOMI, normal conjunctiva, no scleral icterus.  ENT: Moist mucous membranes.  NECK: Supple.  CHEST/LUNG: Clear to auscultation bilaterally; No rales, rhonchi, wheezing, or rubs. Unlabored respirations.  HEART: Regular rate and rhythm; No murmurs, rubs, or gallops.  ABDOMEN: BSx4; Soft, nontender, nondistended.  EXTREMITIES:  No edema.  NERVOUS SYSTEM:  A&Ox3, no obvious focal deficits.  SKIN: No rashes or lesions.      LABS:                         9.0    3.81  )-----------( 122      ( 14 Jan 2022 20:16 )             27.2     01-14    138  |  109<H>  |  98<H>  ----------------------------<  193<H>  6.2<HH>   |  11<L>  |  23.80<H>    Ca    7.0<L>      14 Jan 2022 20:16  Phos  7.7     01-14  Mg     3.7     01-14    TPro  6.7  /  Alb  2.8<L>  /  TBili  0.5  /  DBili  x   /  AST  3<L>  /  ALT  14  /  AlkPhos  109  01-14          RADIOLOGY & ADDITIONAL TESTS: Reviewed.

## 2022-01-15 NOTE — PATIENT PROFILE ADULT - FALL HARM RISK - HARM RISK INTERVENTIONS
Assistance with ambulation/Assistance OOB with selected safe patient handling equipment/Communicate Risk of Fall with Harm to all staff/Discuss with provider need for PT consult/Monitor gait and stability/Reinforce activity limits and safety measures with patient and family/Review medications for side effects contributing to fall risk/Sit up slowly, dangle for a short time, stand at bedside before walking/Tailored Fall Risk Interventions/Toileting schedule using arm’s reach rule for commode and bathroom/Visual Cue: Yellow wristband and red socks/Bed in lowest position, wheels locked, appropriate side rails in place/Call bell, personal items and telephone in reach/Instruct patient to call for assistance before getting out of bed or chair/Non-slip footwear when patient is out of bed/[place] to call system/Physically safe environment - no spills, clutter or unnecessary equipment/Purposeful Proactive Rounding/Room/bathroom lighting operational, light cord in reach

## 2022-01-16 ENCOUNTER — TRANSCRIPTION ENCOUNTER (OUTPATIENT)
Age: 62
End: 2022-01-16

## 2022-01-16 LAB
GLUCOSE BLDC GLUCOMTR-MCNC: 186 MG/DL — HIGH (ref 70–99)
GLUCOSE BLDC GLUCOMTR-MCNC: 207 MG/DL — HIGH (ref 70–99)
GLUCOSE BLDC GLUCOMTR-MCNC: 60 MG/DL — LOW (ref 70–99)
MRSA PCR RESULT.: SIGNIFICANT CHANGE UP
S AUREUS DNA NOSE QL NAA+PROBE: SIGNIFICANT CHANGE UP

## 2022-01-16 RX ADMIN — AMLODIPINE BESYLATE 10 MILLIGRAM(S): 2.5 TABLET ORAL at 05:38

## 2022-01-16 RX ADMIN — Medication 5 MILLIGRAM(S): at 05:37

## 2022-01-16 RX ADMIN — Medication 25 MILLIGRAM(S): at 05:38

## 2022-01-16 RX ADMIN — SEVELAMER CARBONATE 2400 MILLIGRAM(S): 2400 POWDER, FOR SUSPENSION ORAL at 12:43

## 2022-01-16 RX ADMIN — Medication 1 GRAM(S): at 12:43

## 2022-01-16 RX ADMIN — SIMVASTATIN 40 MILLIGRAM(S): 20 TABLET, FILM COATED ORAL at 21:09

## 2022-01-16 RX ADMIN — Medication 1 GRAM(S): at 23:06

## 2022-01-16 RX ADMIN — Medication 1 GRAM(S): at 01:51

## 2022-01-16 RX ADMIN — NORTRIPTYLINE HYDROCHLORIDE 10 MILLIGRAM(S): 10 CAPSULE ORAL at 21:09

## 2022-01-16 RX ADMIN — Medication 5 MILLIGRAM(S): at 13:25

## 2022-01-16 RX ADMIN — Medication 1: at 12:09

## 2022-01-16 RX ADMIN — SODIUM ZIRCONIUM CYCLOSILICATE 10 GRAM(S): 10 POWDER, FOR SUSPENSION ORAL at 05:38

## 2022-01-16 RX ADMIN — SODIUM ZIRCONIUM CYCLOSILICATE 10 GRAM(S): 10 POWDER, FOR SUSPENSION ORAL at 18:31

## 2022-01-16 RX ADMIN — Medication 1 GRAM(S): at 05:39

## 2022-01-16 RX ADMIN — Medication 2: at 08:20

## 2022-01-16 RX ADMIN — Medication 5 MILLIGRAM(S): at 21:09

## 2022-01-16 RX ADMIN — PANTOPRAZOLE SODIUM 40 MILLIGRAM(S): 20 TABLET, DELAYED RELEASE ORAL at 05:38

## 2022-01-16 NOTE — PROGRESS NOTE ADULT - SUBJECTIVE AND OBJECTIVE BOX
Patient is a 61y Male with  ESRD ON HD    IS  VERY  NON COMPLAINT WITH HIS HD  REGIMEN    FREQ  DEMANDS TO  BE RINSED BACK   SOONER  THAN HIS  PRESCRIBED TIME   WAS DIALYZED YESTERDAY FO R 1 HR,  DEMANDED TO BE RINSED BACK  OTHERWISE  WAS THREATENING  TO PULL OUT HIS NEEDLES   SEEN  TODAY   HAS NO COMPLAINTS AT THE TIME OF EXAM    fish (Rash)  liver (Anaphylaxis)  No Known Drug Allergies    Hospital Medications:   MEDICATIONS  (STANDING):  amLODIPine   Tablet 10 milliGRAM(s) Oral daily  chlorhexidine 2% Cloths 1 Application(s) Topical daily  diphenhydrAMINE Injectable 25 milliGRAM(s) IV Push every 12 hours  epoetin beverley-epbx (RETACRIT) Injectable 4000 Unit(s) IV Push <User Schedule>  insulin glargine Injectable (LANTUS) 4 Unit(s) SubCutaneous at bedtime  insulin lispro (ADMELOG) corrective regimen sliding scale   SubCutaneous three times a day before meals  insulin lispro (ADMELOG) corrective regimen sliding scale   SubCutaneous at bedtime  metoclopramide Injectable 5 milliGRAM(s) IV Push every 8 hours  nortriptyline 10 milliGRAM(s) Oral at bedtime  pantoprazole  Injectable 40 milliGRAM(s) IV Push two times a day  sevelamer carbonate 2400 milliGRAM(s) Oral three times a day with meals  simvastatin 40 milliGRAM(s) Oral at bedtime  sodium zirconium cyclosilicate 10 Gram(s) Oral every 12 hours  sucralfate suspension 1 Gram(s) Oral four times a day    REVIEW OF SYSTEMS:   REUSES TO ANSWER  QUESTIONS   DOESNT APPEAR SOB   IS AFEBRILE     VITALS:  T(F): 97.8 (01-16-22 @ 04:51), Max: 98.8 (01-15-22 @ 13:58)  HR: 103 (01-16-22 @ 04:51)  BP: 121/73 (01-16-22 @ 04:51)  RR: 18 (01-16-22 @ 04:51)  SpO2: 96% (01-16-22 @ 04:51)  Wt(kg): --      PHYSICAL EXAM:  Constitutional: NAD  HEENT: CONJ  PINK  Neck: No JVD  Respiratory: CTAB, no wheezes, rales or rhonchi  Cardiovascular: S1, S2, RRR  Gastrointestinal: BS+, soft, NT/ND  Extremities:  No peripheral edema S/P  L  BKA  Neurological: A/O x 3,  : No [place].     Vascular Access: R  UPPER ARM AVF,  WORKING WELL    LABS:  01-15    133<L>  |  105  |  103<H>  ----------------------------<  255<H>  5.2   |  12<L>  |  24.90<H>    Ca    7.0<L>      15 Deep 2022 17:36  Phos  7.8     01-15  Mg     3.6     01-15    TPro  6.2  /  Alb  2.6<L>  /  TBili  0.5  /  DBili      /  AST  <3<L>  /  ALT  11  /  AlkPhos  72  01-15    Creatinine Trend: 24.90 <--, 23.80 <--, 23.70 <--                        8.9    3.02  )-----------( 117      ( 15 Deep 2022 17:36 )             26.7     Urine Studies:      RADIOLOGY & ADDITIONAL STUDIES:

## 2022-01-16 NOTE — PROGRESS NOTE ADULT - SUBJECTIVE AND OBJECTIVE BOX
Patient is a 61y old  Male who presents with a chief complaint of Hematemesis/ Hyperkalemia 2/2 to Missed HD session (16 Jan 2022 13:51)    PATIENT IS SEEN AND EXAMINED IN MEDICAL FLOOR.      ALLERGIES:  fish (Rash)  liver (Anaphylaxis)  No Known Drug Allergies      VITALS:    Vital Signs Last 24 Hrs  T(C): 37 (16 Jan 2022 14:11), Max: 37 (16 Jan 2022 14:11)  T(F): 98.6 (16 Jan 2022 14:11), Max: 98.6 (16 Jan 2022 14:11)  HR: 90 (16 Jan 2022 14:11) (90 - 110)  BP: 131/74 (16 Jan 2022 14:11) (121/73 - 136/56)  BP(mean): --  RR: 17 (16 Jan 2022 14:11) (17 - 18)  SpO2: 98% (16 Jan 2022 14:11) (96% - 98%)    LABS:    CBC Full  -  ( 15 Deep 2022 17:36 )  WBC Count : 3.02 K/uL  RBC Count : 3.06 M/uL  Hemoglobin : 8.9 g/dL  Hematocrit : 26.7 %  Platelet Count - Automated : 117 K/uL  Mean Cell Volume : 87.3 fl  Mean Cell Hemoglobin : 29.1 pg  Mean Cell Hemoglobin Concentration : 33.3 gm/dL  Auto Neutrophil # : x  Auto Lymphocyte # : x  Auto Monocyte # : x  Auto Eosinophil # : x  Auto Basophil # : x  Auto Neutrophil % : x  Auto Lymphocyte % : x  Auto Monocyte % : x  Auto Eosinophil % : x  Auto Basophil % : x      01-15    133<L>  |  105  |  103<H>  ----------------------------<  255<H>  5.2   |  12<L>  |  24.90<H>    Ca    7.0<L>      15 Deep 2022 17:36  Phos  7.8     01-15  Mg     3.6     01-15    TPro  6.2  /  Alb  2.6<L>  /  TBili  0.5  /  DBili  x   /  AST  <3<L>  /  ALT  11  /  AlkPhos  72  01-15        CAPILLARY BLOOD GLUCOSE      POCT Blood Glucose.: 60 mg/dL (16 Jan 2022 16:56)  POCT Blood Glucose.: 186 mg/dL (16 Jan 2022 11:26)  POCT Blood Glucose.: 207 mg/dL (16 Jan 2022 08:06)  POCT Blood Glucose.: 233 mg/dL (15 Deep 2022 21:03)        LIVER FUNCTIONS - ( 15 Deep 2022 17:36 )  Alb: 2.6 g/dL / Pro: 6.2 g/dL / ALK PHOS: 72 U/L / ALT: 11 U/L DA / AST: <3 U/L / GGT: x           Creatinine Trend: 24.90<--, 23.80<--, 23.70<--, 11.60<--, 14.20<--, 11.90<--  I&O's Summary          .Blood Blood-Peripheral  12-23 @ 20:58   No Growth Final  --  --      .Blood Blood-Peripheral  12-23 @ 20:56   No Growth Final  --  --          MEDICATIONS:    MEDICATIONS  (STANDING):  amLODIPine   Tablet 10 milliGRAM(s) Oral daily  chlorhexidine 2% Cloths 1 Application(s) Topical daily  diphenhydrAMINE Injectable 25 milliGRAM(s) IV Push every 12 hours  epoetin beverley-epbx (RETACRIT) Injectable 4000 Unit(s) IV Push <User Schedule>  insulin glargine Injectable (LANTUS) 4 Unit(s) SubCutaneous at bedtime  insulin lispro (ADMELOG) corrective regimen sliding scale   SubCutaneous three times a day before meals  insulin lispro (ADMELOG) corrective regimen sliding scale   SubCutaneous at bedtime  metoclopramide Injectable 5 milliGRAM(s) IV Push every 8 hours  nortriptyline 10 milliGRAM(s) Oral at bedtime  pantoprazole  Injectable 40 milliGRAM(s) IV Push two times a day  sevelamer carbonate 2400 milliGRAM(s) Oral three times a day with meals  simvastatin 40 milliGRAM(s) Oral at bedtime  sucralfate suspension 1 Gram(s) Oral four times a day      MEDICATIONS  (PRN):  acetaminophen     Tablet .. 650 milliGRAM(s) Oral every 6 hours PRN Moderate Pain (4 - 6)  ALBUTerol    90 MICROgram(s) HFA Inhaler 2 Puff(s) Inhalation every 6 hours PRN Shortness of Breath and/or Wheezing        REVIEW OF SYSTEMS:                           ALL ROS DONE [ X   ]      CONSTITUTIONAL:  LETHARGIC [   ], FEVER [   ], UNRESPONSIVE [   ]  CVS:  CP  [   ], SOB, [   ], PALPITATIONS [   ], DIZZYNESS [   ]  RS: COUGH [   ], SPUTUM [   ]  GI: ABDOMINAL PAIN [   ], NAUSEA [   ], VOMITINGS [   ], DIARRHEA [   ], CONSTIPATION [   ]  :  DYSURIA [   ], NOCTURIA [   ], INCREASED FREQUENCY [   ], DRIBLING [   ],  SKELETAL: PAINFUL JOINTS [   ], SWOLLEN JOINTS [   ], NECK ACHE [   ], LOW BACK ACHE [   ],  SKIN : ULCERS [   ], RASH [   ], ITCHING [   ]  CNS: HEAD ACHE [   ], DOUBLE VISION [   ], BLURRED VISION [   ], AMS / CONFUSION [   ], SEIZURES [   ], WEAKNESS [   ],TINGLING / NUMBNESS [   ]      PHYSICAL EXAMINATION:    GENERAL APPEARANCE: NO DISTRESS  HEENT:  NO PALLOR, NO  JVD,  NO   NODES, NECK SUPPLE  CVS: S1 +, S2 +,   RS: AEEB,  OCCASIONAL  RALES +,   NO RONCHI  ABD: SOFT, NT, NO, BS +  EXT: NO PE  SKIN: WARM,   SKELETAL:  ROM REDUCED AT CERVICAL AND LS SPINE   CNS:  AAO X 1  , MOVING ALL LIMBS        RADIOLOGY :      PHYSICAL EXAMINATION:    GENERAL APPEARANCE: NO DISTRESS  HEENT:  NO PALLOR, NO  JVD,  NO   NODES, NECK SUPPLE  CVS: S1 +, S2 +,   RS: AEEB,  OCCASIONAL  RALES +,   NO RONCHI  ABD: SOFT, NT, NO, BS +  EXT: NO PE  SKIN: WARM,   SKELETAL:  ROM REDUCED AT CERVICAL & LS SPINE   CNS:  AAO X  2-3 , MOVES ALL LIMBS         RADIOLOGY :    < from: CT Abdomen and Pelvis No Cont (12.26.21 @ 14:07) >  IMPRESSION:  *  Mild pulmonary edema and trace bilateral pleural effusions.  *  Moderate cardiomegaly and trace pericardial effusion.  *  Thickening and patulous esophagus again noted as before.        < end of copied text >  < from: CT Chest No Cont (12.26.21 @ 14:07) >  IMPRESSION:  *  Mild pulmonary edema and trace bilateral pleural effusions.  *  Moderate cardiomegaly and trace pericardial effusion.  *  Thickening and patulous esophagus again noted as before.      < end of copied text >        ASSESSMENT :     Hematemesis      Hypertension    Adrenal insufficiency    CKD (chronic kidney disease)    Anemia    Glaucoma    Coronary artery disease    HLD (hyperlipidemia)    Peripheral vascular disease    Spinal stenosis of lumbosacral region    Hyperparathyroidism    Diabetes mellitus    Diabetic neuropathy    Contracture of hand    Osteoarthritis    Osteoporosis    Vision loss of left eye    ESRD on hemodialysis    Cataract    BPH (benign prostatic hyperplasia)    UTI (urinary tract infection)    Bladder mass    H/O hematuria    Osteoporosis    Vision loss of right eye    Depression    Chronic GERD    Osteomyelitis of vertebra    Below knee amputation status, left    History of right cataract extraction    History of left cataract extraction    S/P arteriovenous (AV) fistula creation    H/O hematuria    H/O transurethral destruction of bladder lesion    History of excision of mass      ASSESSMENT :     Hematemesis      Hypertension    Adrenal insufficiency    CKD (chronic kidney disease)    Anemia    Glaucoma    Coronary artery disease    HLD (hyperlipidemia)    Peripheral vascular disease    Spinal stenosis of lumbosacral region    Hyperparathyroidism    Diabetes mellitus    Diabetic neuropathy    Contracture of hand    Osteoarthritis    Osteoporosis    Vision loss of left eye    ESRD on hemodialysis    Cataract    BPH (benign prostatic hyperplasia)    UTI (urinary tract infection)    Bladder mass    H/O hematuria    Osteoporosis    Vision loss of right eye    Depression    Chronic GERD    Osteomyelitis of vertebra    Below knee amputation status, left    History of right cataract extraction    History of left cataract extraction    S/P arteriovenous (AV) fistula creation    H/O hematuria    H/O transurethral destruction of bladder lesion    History of excision of mass        PLAN:  HPI:  Patient is a 60 y/o male from [facility] with a past medical history of hypertension, anemia, CAD, hyperlipidemia, diabetes mellitus, GERD and adrenal insuffiencey, ESRD on HD T,Th,S reports to the ED for vomiting blood. Patient noted to  have one episode of dark vomiting and complains of nausea. Patient denies any abdominal pain, dysphagia, hematochezia or melena. Patient denies any changes to appetite or weight. Patient denies any prior upper endoscopy or prior episodes. Patient also missed dialysis yesterday. Patient, however, states he had dialysis yesterday and is scheduled for the next dialysis appointment on Saturday. Patient's dialysis schedule is T, Th, S. Patient denies any cp, sob or palpitations. Denies lower extremity swelling. Denies fevers or chills, sob or cough.    (14 Jan 2022 16:07)    PLAN:    HPI:    Patient is a 60 y/o male from [facility] with a past medical history of hypertension, anemia, CAD, hyperlipidemia, diabetes mellitus, GERD and adrenal insuffiencey, ESRD on HD T,Th,S reports to the ED for vomiting blood. Patient noted to  have one episode of dark vomiting and complains of nausea. Patient denies any abdominal pain, dysphagia, hematochezia or melena. Patient denies any changes to appetite or weight. Patient denies any prior upper endoscopy or prior episodes. Patient also missed dialysis yesterday. Patient, however, states he had dialysis yesterday and is scheduled for the next dialysis appointment on Saturday. Patient's dialysis schedule is T, Th, S. Patient denies any cp, sob or palpitations. Denies lower extremity swelling. Denies fevers or chills, sob or cough.    (14 Jan 2022 16:07)    # ADDED IV. REGLAN AND BENADRYL FOR GASTROPARESIS     AND VOMITING    # RECURRENT EMESIS W/ HISTORY OF ESOPHAGITIS AND DUODENITIS [1/2021], HX OF GASTROPARESIS W/ CURRENT EVIDENCE OF THICKENED ESOPHAGUS ON CT SCAN [11/9]   - MONITORING HGB, PLACED ON PPI BID, CARAFATE, PRN ANTIEMETICS, - GASTROENTEROLOGY CONSULT  - CLD DIET PER GI  - PATIENT AGREES TO CONSIDER EGD, IF HE HAS REPEAT EMESIS HERE - COUNSELLED REGARDING MORTALITY RISK OF NOT PURSUING ENDOSCOPING EVALUATION.     # HYPERKALEMIA - GIVEN LOKELMA, F/U REPEAT POTASSIUM  - F/U EKG  - NEPHROLOGY CONSULT IN PROGRESS    # HX OF ANEMIA OF CKD    # SEVERE PROTEIN CALORIE MALNUTRITION, FAILURE TO THRIVE - NUTRITIONAL SUPPLEMENT  # ANEMIA OF CKD  # HLD  # ESRD ON HD TTS - W/ HX OF NONCOMPLIANCE - NEPHROLOGY CONSULT IN PROGRESS  # HTN  # DM   # PARTIALLY BLIND  # S/P LEFT BKA  # HX OF PVD  # LS SPINAL STENOSIS  # GI AND DVT PPX    DR. KUSH MAC MD .       Patient is a 61y old  Male who presents with a chief complaint of Hematemesis/ Hyperkalemia 2/2 to Missed HD session (16 Jan 2022 13:51)    PATIENT IS SEEN AND EXAMINED IN MEDICAL FLOOR.      ALLERGIES:  fish (Rash)  liver (Anaphylaxis)  No Known Drug Allergies      VITALS:    Vital Signs Last 24 Hrs  T(C): 37 (16 Jan 2022 14:11), Max: 37 (16 Jan 2022 14:11)  T(F): 98.6 (16 Jan 2022 14:11), Max: 98.6 (16 Jan 2022 14:11)  HR: 90 (16 Jan 2022 14:11) (90 - 110)  BP: 131/74 (16 Jan 2022 14:11) (121/73 - 136/56)  BP(mean): --  RR: 17 (16 Jan 2022 14:11) (17 - 18)  SpO2: 98% (16 Jan 2022 14:11) (96% - 98%)    LABS:    CBC Full  -  ( 15 Deep 2022 17:36 )  WBC Count : 3.02 K/uL  RBC Count : 3.06 M/uL  Hemoglobin : 8.9 g/dL  Hematocrit : 26.7 %  Platelet Count - Automated : 117 K/uL  Mean Cell Volume : 87.3 fl  Mean Cell Hemoglobin : 29.1 pg  Mean Cell Hemoglobin Concentration : 33.3 gm/dL  Auto Neutrophil # : x  Auto Lymphocyte # : x  Auto Monocyte # : x  Auto Eosinophil # : x  Auto Basophil # : x  Auto Neutrophil % : x  Auto Lymphocyte % : x  Auto Monocyte % : x  Auto Eosinophil % : x  Auto Basophil % : x      01-15    133<L>  |  105  |  103<H>  ----------------------------<  255<H>  5.2   |  12<L>  |  24.90<H>    Ca    7.0<L>      15 Deep 2022 17:36  Phos  7.8     01-15  Mg     3.6     01-15    TPro  6.2  /  Alb  2.6<L>  /  TBili  0.5  /  DBili  x   /  AST  <3<L>  /  ALT  11  /  AlkPhos  72  01-15        CAPILLARY BLOOD GLUCOSE      POCT Blood Glucose.: 60 mg/dL (16 Jan 2022 16:56)  POCT Blood Glucose.: 186 mg/dL (16 Jan 2022 11:26)  POCT Blood Glucose.: 207 mg/dL (16 Jan 2022 08:06)  POCT Blood Glucose.: 233 mg/dL (15 Deep 2022 21:03)        LIVER FUNCTIONS - ( 15 Deep 2022 17:36 )  Alb: 2.6 g/dL / Pro: 6.2 g/dL / ALK PHOS: 72 U/L / ALT: 11 U/L DA / AST: <3 U/L / GGT: x           Creatinine Trend: 24.90<--, 23.80<--, 23.70<--, 11.60<--, 14.20<--, 11.90<--  I&O's Summary          .Blood Blood-Peripheral  12-23 @ 20:58   No Growth Final  --  --      .Blood Blood-Peripheral  12-23 @ 20:56   No Growth Final  --  --          MEDICATIONS:    MEDICATIONS  (STANDING):  amLODIPine   Tablet 10 milliGRAM(s) Oral daily  chlorhexidine 2% Cloths 1 Application(s) Topical daily  diphenhydrAMINE Injectable 25 milliGRAM(s) IV Push every 12 hours  epoetin beverley-epbx (RETACRIT) Injectable 4000 Unit(s) IV Push <User Schedule>  insulin glargine Injectable (LANTUS) 4 Unit(s) SubCutaneous at bedtime  insulin lispro (ADMELOG) corrective regimen sliding scale   SubCutaneous three times a day before meals  insulin lispro (ADMELOG) corrective regimen sliding scale   SubCutaneous at bedtime  metoclopramide Injectable 5 milliGRAM(s) IV Push every 8 hours  nortriptyline 10 milliGRAM(s) Oral at bedtime  pantoprazole  Injectable 40 milliGRAM(s) IV Push two times a day  sevelamer carbonate 2400 milliGRAM(s) Oral three times a day with meals  simvastatin 40 milliGRAM(s) Oral at bedtime  sucralfate suspension 1 Gram(s) Oral four times a day      MEDICATIONS  (PRN):  acetaminophen     Tablet .. 650 milliGRAM(s) Oral every 6 hours PRN Moderate Pain (4 - 6)  ALBUTerol    90 MICROgram(s) HFA Inhaler 2 Puff(s) Inhalation every 6 hours PRN Shortness of Breath and/or Wheezing        REVIEW OF SYSTEMS:                           ALL ROS DONE [ X   ]      CONSTITUTIONAL:  LETHARGIC [   ], FEVER [   ], UNRESPONSIVE [   ]  CVS:  CP  [   ], SOB, [   ], PALPITATIONS [   ], DIZZYNESS [   ]  RS: COUGH [   ], SPUTUM [   ]  GI: ABDOMINAL PAIN [   ], NAUSEA [   ], VOMITINGS [   ], DIARRHEA [   ], CONSTIPATION [   ]  :  DYSURIA [   ], NOCTURIA [   ], INCREASED FREQUENCY [   ], DRIBLING [   ],  SKELETAL: PAINFUL JOINTS [   ], SWOLLEN JOINTS [   ], NECK ACHE [   ], LOW BACK ACHE [   ],  SKIN : ULCERS [   ], RASH [   ], ITCHING [   ]  CNS: HEAD ACHE [   ], DOUBLE VISION [   ], BLURRED VISION [   ], AMS / CONFUSION [   ], SEIZURES [   ], WEAKNESS [   ],TINGLING / NUMBNESS [   ]      PHYSICAL EXAMINATION:    GENERAL APPEARANCE: NO DISTRESS  HEENT:  NO PALLOR, NO  JVD,  NO   NODES, NECK SUPPLE  CVS: S1 +, S2 +,   RS: AEEB,  OCCASIONAL  RALES +,   NO RONCHI  ABD: SOFT, NT, NO, BS +  EXT: NO PE  SKIN: WARM,   SKELETAL:  ROM REDUCED AT CERVICAL AND LS SPINE   CNS:  AAO X 1  , MOVING ALL LIMBS        RADIOLOGY :      PHYSICAL EXAMINATION:    GENERAL APPEARANCE: NO DISTRESS  HEENT:  NO PALLOR, NO  JVD,  NO   NODES, NECK SUPPLE  CVS: S1 +, S2 +,   RS: AEEB,  OCCASIONAL  RALES +,   NO RONCHI  ABD: SOFT, NT, NO, BS +  EXT: NO PE  SKIN: WARM,   SKELETAL:  ROM REDUCED AT CERVICAL & LS SPINE   CNS:  AAO X  2-3 , MOVES ALL LIMBS         RADIOLOGY :    < from: CT Abdomen and Pelvis No Cont (12.26.21 @ 14:07) >  IMPRESSION:  *  Mild pulmonary edema and trace bilateral pleural effusions.  *  Moderate cardiomegaly and trace pericardial effusion.  *  Thickening and patulous esophagus again noted as before.        < end of copied text >  < from: CT Chest No Cont (12.26.21 @ 14:07) >  IMPRESSION:  *  Mild pulmonary edema and trace bilateral pleural effusions.  *  Moderate cardiomegaly and trace pericardial effusion.  *  Thickening and patulous esophagus again noted as before.      < end of copied text >        ASSESSMENT :     Hematemesis      Hypertension    Adrenal insufficiency    CKD (chronic kidney disease)    Anemia    Glaucoma    Coronary artery disease    HLD (hyperlipidemia)    Peripheral vascular disease    Spinal stenosis of lumbosacral region    Hyperparathyroidism    Diabetes mellitus    Diabetic neuropathy    Contracture of hand    Osteoarthritis    Osteoporosis    Vision loss of left eye    ESRD on hemodialysis    Cataract    BPH (benign prostatic hyperplasia)    UTI (urinary tract infection)    Bladder mass    H/O hematuria    Osteoporosis    Vision loss of right eye    Depression    Chronic GERD    Osteomyelitis of vertebra    Below knee amputation status, left    History of right cataract extraction    History of left cataract extraction    S/P arteriovenous (AV) fistula creation    H/O hematuria    H/O transurethral destruction of bladder lesion    History of excision of mass      ASSESSMENT :     Hematemesis      Hypertension    Adrenal insufficiency    CKD (chronic kidney disease)    Anemia    Glaucoma    Coronary artery disease    HLD (hyperlipidemia)    Peripheral vascular disease    Spinal stenosis of lumbosacral region    Hyperparathyroidism    Diabetes mellitus    Diabetic neuropathy    Contracture of hand    Osteoarthritis    Osteoporosis    Vision loss of left eye    ESRD on hemodialysis    Cataract    BPH (benign prostatic hyperplasia)    UTI (urinary tract infection)    Bladder mass    H/O hematuria    Osteoporosis    Vision loss of right eye    Depression    Chronic GERD    Osteomyelitis of vertebra    Below knee amputation status, left    History of right cataract extraction    History of left cataract extraction    S/P arteriovenous (AV) fistula creation    H/O hematuria    H/O transurethral destruction of bladder lesion    History of excision of mass        PLAN:  HPI:  Patient is a 60 y/o male from [facility] with a past medical history of hypertension, anemia, CAD, hyperlipidemia, diabetes mellitus, GERD and adrenal insuffiencey, ESRD on HD T,Th,S reports to the ED for vomiting blood. Patient noted to  have one episode of dark vomiting and complains of nausea. Patient denies any abdominal pain, dysphagia, hematochezia or melena. Patient denies any changes to appetite or weight. Patient denies any prior upper endoscopy or prior episodes. Patient also missed dialysis yesterday. Patient, however, states he had dialysis yesterday and is scheduled for the next dialysis appointment on Saturday. Patient's dialysis schedule is T, Th, S. Patient denies any cp, sob or palpitations. Denies lower extremity swelling. Denies fevers or chills, sob or cough.    (14 Jan 2022 16:07)    PLAN:    HPI:    Patient is a 60 y/o male from [facility] with a past medical history of hypertension, anemia, CAD, hyperlipidemia, diabetes mellitus, GERD and adrenal insuffiencey, ESRD on HD T,Th,S reports to the ED for vomiting blood. Patient noted to  have one episode of dark vomiting and complains of nausea. Patient denies any abdominal pain, dysphagia, hematochezia or melena. Patient denies any changes to appetite or weight. Patient denies any prior upper endoscopy or prior episodes. Patient also missed dialysis yesterday. Patient, however, states he had dialysis yesterday and is scheduled for the next dialysis appointment on Saturday. Patient's dialysis schedule is T, Th, S. Patient denies any cp, sob or palpitations. Denies lower extremity swelling. Denies fevers or chills, sob or cough.    (14 Jan 2022 16:07)    # ADDED IV. REGLAN AND BENADRYL FOR GASTROPARESIS     AND VOMITING WITH RESOLVING SIGN AND SYMPTOMS    # RECURRENT EMESIS W/ HISTORY OF ESOPHAGITIS AND DUODENITIS [1/2021], HX OF GASTROPARESIS W/ CURRENT EVIDENCE OF THICKENED ESOPHAGUS ON CT SCAN [11/9]   - MONITORING HGB, PLACED ON PPI BID, CARAFATE, PRN ANTIEMETICS, - GASTROENTEROLOGY CONSULT  - CLD DIET PER GI  - PATIENT AGREES TO CONSIDER EGD, IF HE HAS REPEAT EMESIS HERE - COUNSELLED REGARDING MORTALITY RISK OF NOT PURSUING ENDOSCOPING EVALUATION.     # HYPERKALEMIA - GIVEN LOKELMA, F/U REPEAT POTASSIUM  - F/U EKG  - NEPHROLOGY CONSULT IN PROGRESS    # HX OF ANEMIA OF CKD    # SEVERE PROTEIN CALORIE MALNUTRITION, FAILURE TO THRIVE - NUTRITIONAL SUPPLEMENT  # ANEMIA OF CKD  # HLD  # ESRD ON HD TTS - W/ HX OF NONCOMPLIANCE - NEPHROLOGY CONSULT IN PROGRESS  # HTN  # DM   # PARTIALLY BLIND  # S/P LEFT BKA  # HX OF PVD  # LS SPINAL STENOSIS  # GI AND DVT PPX    DR. KUSH MAC MD .

## 2022-01-17 LAB
GLUCOSE BLDC GLUCOMTR-MCNC: 169 MG/DL — HIGH (ref 70–99)
GLUCOSE BLDC GLUCOMTR-MCNC: 202 MG/DL — HIGH (ref 70–99)
GLUCOSE BLDC GLUCOMTR-MCNC: 215 MG/DL — HIGH (ref 70–99)
GLUCOSE BLDC GLUCOMTR-MCNC: 329 MG/DL — HIGH (ref 70–99)

## 2022-01-17 RX ORDER — ALBUTEROL 90 UG/1
2 AEROSOL, METERED ORAL
Qty: 0 | Refills: 0 | DISCHARGE
Start: 2022-01-17

## 2022-01-17 RX ORDER — SUCRALFATE 1 G
10 TABLET ORAL
Qty: 0 | Refills: 0 | DISCHARGE
Start: 2022-01-17

## 2022-01-17 RX ORDER — AMLODIPINE BESYLATE 2.5 MG/1
1 TABLET ORAL
Qty: 0 | Refills: 0 | DISCHARGE
Start: 2022-01-17

## 2022-01-17 RX ADMIN — SEVELAMER CARBONATE 2400 MILLIGRAM(S): 2400 POWDER, FOR SUSPENSION ORAL at 08:40

## 2022-01-17 RX ADMIN — AMLODIPINE BESYLATE 10 MILLIGRAM(S): 2.5 TABLET ORAL at 06:10

## 2022-01-17 RX ADMIN — Medication 2: at 17:10

## 2022-01-17 RX ADMIN — Medication 4: at 11:45

## 2022-01-17 RX ADMIN — Medication 1 GRAM(S): at 23:21

## 2022-01-17 RX ADMIN — Medication 1 GRAM(S): at 12:26

## 2022-01-17 RX ADMIN — Medication 650 MILLIGRAM(S): at 23:21

## 2022-01-17 RX ADMIN — SIMVASTATIN 40 MILLIGRAM(S): 20 TABLET, FILM COATED ORAL at 21:05

## 2022-01-17 RX ADMIN — SEVELAMER CARBONATE 2400 MILLIGRAM(S): 2400 POWDER, FOR SUSPENSION ORAL at 12:26

## 2022-01-17 RX ADMIN — Medication 1 GRAM(S): at 05:14

## 2022-01-17 RX ADMIN — NORTRIPTYLINE HYDROCHLORIDE 10 MILLIGRAM(S): 10 CAPSULE ORAL at 21:05

## 2022-01-17 NOTE — DISCHARGE NOTE PROVIDER - NSDCCPCAREPLAN_GEN_ALL_CORE_FT
PRINCIPAL DISCHARGE DIAGNOSIS  Diagnosis: Hematemesis with nausea  Assessment and Plan of Treatment: you were sent to the hospital for episodes of vomiting blood, your blood count was lower than your baseline, you were evaluated by GI specialist and upper endoscopy was recommended given your symptoms and anemia, however you refused the procedure understanding the risks and benefits??????????????????????????????????????????????????????????  You were started on medications named Protonix and Carafate to manage your symptoms, you did not have any further episode of vomiting blood.   Your symptoms could be due to nausea and vomiting symptoms  from missing and or not completing the full dialysis sessions.  it is strongly recommended that you continue dialysis 3 times a week and complete the full session to avoid futile complications  Continue medications as prescribed and follow up with PCP within 1 week   Symptoms to report, bleeding, palpitations, fatigue, pale skin, cold skin, dizziness. Take medications as ordered by PCP        SECONDARY DISCHARGE DIAGNOSES  Diagnosis: ESRD on hemodialysis  Assessment and Plan of Treatment: continue dialysis as recommended, it is strongly recommended that you continue dialysis 3 times a week and complete the full session to avoid futile complications    Diagnosis: HTN (hypertension)  Assessment and Plan of Treatment: Low salt diet  Activity as tolerated.  Take all medication as prescribed.  Follow up with your medical doctor for routine blood pressure monitoring at your next visit.  Notify your doctor if you have any of the following symptoms:   Dizziness, Lightheadedness, Blurry vision, Headache, Chest pain, Shortness of breath      Diagnosis: 2019 novel coronavirus disease (COVID-19)  Assessment and Plan of Treatment: Ypu were found to be COVID positive on admission but did not have any symptoms and did not require any treatment   The new CDC guideline for COVID are as as following:   -Given what we currently know about COVID-19 and the Omicron variant, CDC is shortening the recommended time for isolation for the public. People with COVID-19 should isolate for 5 days and if they are asymptomatic or their symptoms are resolving (without fever for 24 hours), follow that by 5 days of wearing a mask when around others to minimize the risk of infecting people they encounter.  -Additionally, CDC is updating the recommended quarantine period for anyone in the general public who is exposed to COVID-19. For people who are unvaccinated or are more than six months out from their second mRNA dose (or more than 2 months after the J&J vaccine) and not yet boosted, CDC now recommends quarantine for 5 days followed by strict mask use for an additional 5 days. Alternatively, if a 5-day quarantine is not feasible, it is imperative that an exposed person wear a well-fitting mask at all times when around others for 10 days after exposure. Individuals who have received their booster shot do not need to quarantine following an exposure, but should wear a mask for 10 days after the exposure. If symptoms occur, individuals should immediately quarantine until a negative test confirms symptoms are not attributable to COVID-19.      Diagnosis: Hyperkalemia  Assessment and Plan of Treatment: You were found to have Hyperkalemia which  is a high level of potassium in your blood. Potassium helps control how your muscles, heart, and digestive system work. your high potassium level is likely due to missed dialysis. very high potassim level could be life threatning and cause heart rhythm problems   Limit the amount of potassium you eat  Continue dialysis as recommended   Seek care immediately or call 911 if:  You have trouble breathing.  You have an irregular heartbeat.  You have trouble controlling your muscles.  You are too tired or weak to stand up.     PRINCIPAL DISCHARGE DIAGNOSIS  Diagnosis: Hematemesis with nausea  Assessment and Plan of Treatment: you were sent to the hospital for episodes of vomiting blood, your blood count was lower than your baseline, you were evaluated by GI specialist and upper endoscopy was recommended given your symptoms and anemia, however you refused the procedure intially understanding the risks and benefits and agreed later on.   you had no reports of bleeding and you blood count remained stable around your baseline, no further procedure is recommended   You were started on medications named Protonix and Carafate to manage your symptoms, you did not have any further episode of vomiting blood.   Your symptoms could be due to nausea and vomiting symptoms  from missing and or not completing the full dialysis sessions.  it is strongly recommended that you continue dialysis 3 times a week and complete the full session to avoid futile complications  Continue medications as prescribed and follow up with PCP within 1 week   Symptoms to report, bleeding, palpitations, fatigue, pale skin, cold skin, dizziness. Take medications as ordered by PCP        SECONDARY DISCHARGE DIAGNOSES  Diagnosis: ESRD on hemodialysis  Assessment and Plan of Treatment: continue dialysis as recommended, it is strongly recommended that you continue dialysis 3 times a week and complete the full session to avoid futile complications  You are scheduled for dialysis on 1/21 and 1/22    Diagnosis: Hyperkalemia  Assessment and Plan of Treatment: You were found to have Hyperkalemia which  is a high level of potassium in your blood. Potassium helps control how your muscles, heart, and digestive system work. your high potassium level is likely due to missed dialysis. very high potassim level could be life threatning and cause heart rhythm problems   Limit the amount of potassium you eat  Continue dialysis as recommended   Seek care immediately or call 911 if:  You have trouble breathing.  You have an irregular heartbeat.  You have trouble controlling your muscles.  You are too tired or weak to stand up.    Diagnosis: HTN (hypertension)  Assessment and Plan of Treatment: Low salt diet  Activity as tolerated.  Take all medication as prescribed.  Follow up with your medical doctor for routine blood pressure monitoring at your next visit.  Notify your doctor if you have any of the following symptoms:   Dizziness, Lightheadedness, Blurry vision, Headache, Chest pain, Shortness of breath      Diagnosis: 2019 novel coronavirus disease (COVID-19)  Assessment and Plan of Treatment: you have history of COVID, but you did not required any treatment or isolation

## 2022-01-17 NOTE — DIETITIAN INITIAL EVALUATION ADULT. - PERTINENT LABORATORY DATA
01-15 Na133 mmol/L<L> Glu 255 mg/dL<H> K+ 5.2 mmol/L Cr  24.90 mg/dL<H>  mg/dL<H>   01-15 Phos 7.8 mg/dL<H>   01-15 Alb 2.6 g/dL<L>

## 2022-01-17 NOTE — DIETITIAN INITIAL EVALUATION ADULT. - OTHER INFO
Pt from [facility], alert, oriented, COVID19+agnes, in airborne/contact isolation room, unable to conduct a face to face interview due to limited contact restrictions related to pt's medical condition and isolation precautions, attempt made to contact pt via phone ( bedside # [phone]), not answering; Discussed with RN, varied intake depending on food served, agitated ( throw tray out to floor) if getting upset, some food choices obtained from RN and forwarded to dietary, pt to be assisted by Diet Technician for food preferences/daily menu selection; Will add oral nutritional supplement to ensure adequate intake; ESRD on HD, followed by dietitian at skilled nursing facility and HD center Pt from [facility], alert, oriented, COVID19+agnes, in airborne/contact isolation room, unable to conduct a face to face interview due to limited contact restrictions related to pt's medical condition and isolation precautions, attempt made to contact pt via phone ( bedside # [phone]), not answering;     Discussed with RN, pt non-compliant, refusing medication/labs/HD Tx, varied intake depending on food served, agitated ( throw tray out to floor) if getting upset, some food choices obtained from RN and forwarded to dietary, pt to be assisted by Diet Technician for food preferences/daily menu selection; Will add oral nutritional supplement to ensure adequate intake; ESRD on HD, followed by dietitian at skilled nursing facility and HD center Pt from [facility], alert, oriented, COVID19+agnes, in airborne/contact isolation room, unable to conduct a face to face interview due to limited contact restrictions related to pt's medical condition and isolation precautions, attempt made to contact pt via phone ( bedside # [phone]), not answering;     Discussed with RN, pt non-compliant, refusing medication/labs/HD Tx, varied intake depending on food served, agitated ( throw tray out to floor) if getting upset, some food choices obtained from RN and forwarded to dietary, pt to be assisted by Diet Technician for food preferences/daily menu selection if pt cooperate; Will add oral nutritional supplement to ensure adequate intake; ESRD on HD, followed by dietitian at skilled nursing facility and HD center

## 2022-01-17 NOTE — PROGRESS NOTE ADULT - SUBJECTIVE AND OBJECTIVE BOX
[facility] : Progress Note :: [phone], (office [phone]),   Dr Mosher / Dr Washington / Dr Young / Dr Doll / Dr Varsha TURCIOS / Dr Tello / Dr Garcia / Dr Larry qiu  _____________________________________________________________________________________________    refused scheduled HD today    fish (Rash)  liver (Anaphylaxis)  No Known Drug Allergies    Hospital Medications:   MEDICATIONS  (STANDING):  amLODIPine   Tablet 10 milliGRAM(s) Oral daily  chlorhexidine 2% Cloths 1 Application(s) Topical daily  diphenhydrAMINE Injectable 25 milliGRAM(s) IV Push every 12 hours  epoetin beverley-epbx (RETACRIT) Injectable 4000 Unit(s) IV Push <User Schedule>  insulin glargine Injectable (LANTUS) 4 Unit(s) SubCutaneous at bedtime  insulin lispro (ADMELOG) corrective regimen sliding scale   SubCutaneous three times a day before meals  insulin lispro (ADMELOG) corrective regimen sliding scale   SubCutaneous at bedtime  metoclopramide Injectable 5 milliGRAM(s) IV Push every 8 hours  nortriptyline 10 milliGRAM(s) Oral at bedtime  pantoprazole  Injectable 40 milliGRAM(s) IV Push two times a day  sevelamer carbonate 2400 milliGRAM(s) Oral three times a day with meals  simvastatin 40 milliGRAM(s) Oral at bedtime  sucralfate suspension 1 Gram(s) Oral four times a day        VITALS:  T(F): 98.6 (01-17-22 @ 13:27), Max: 98.7 (01-17-22 @ 05:38)  HR: 100 (01-17-22 @ 13:27)  BP: 147/81 (01-17-22 @ 13:27)  RR: 18 (01-17-22 @ 13:27)  SpO2: 98% (01-17-22 @ 13:27)  Wt(kg): --      PHYSICAL EXAM:  no acute distress.  refuses exam    LABS:  01-15    133<L>  |  105  |  103<H>  ----------------------------<  255<H>  5.2   |  12<L>  |  24.90<H>    Ca    7.0<L>      15 Deep 2022 17:36  Phos  7.8     01-15  Mg     3.6     01-15    TPro  6.2  /  Alb  2.6<L>  /  TBili  0.5  /  DBili      /  AST  <3<L>  /  ALT  11  /  AlkPhos  72  01-15    Creatinine Trend: 24.90 <--, 23.80 <--, 23.70 <--                        8.9    3.02  )-----------( 117      ( 15 Deep 2022 17:36 )             26.7     Urine Studies:      RADIOLOGY & ADDITIONAL STUDIES:

## 2022-01-17 NOTE — DISCHARGE NOTE PROVIDER - PROVIDER TOKENS
PROVIDER:[TOKEN:[[id number]],FOLLOWUP:[1 week]],PROVIDER:[TOKEN:[[id number]],FOLLOWUP:[1 week]]

## 2022-01-17 NOTE — DISCHARGE NOTE PROVIDER - NSDCMRMEDTOKEN_GEN_ALL_CORE_FT
albuterol 90 mcg/inh inhalation aerosol: 2 puff(s) inhaled every 6 hours, As needed, Shortness of Breath and/or Wheezing  amLODIPine 10 mg oral tablet: 1 tab(s) orally once a day  aspirin 81 mg oral delayed release tablet: 1 tab(s) orally once a day  cholecalciferol oral tablet: 1000 unit(s) orally once a day  ferrous sulfate 325 mg (65 mg elemental iron) oral tablet: 1 tab(s) orally every other day  folic acid 1 mg oral tablet: 1 tab(s) orally once a day  insulin glargine: 6 unit(s) subcutaneously once a day (at bedtime)   insulin lispro 100 units/mL injectable solution: 6  injectable 3 times a day  Lokelma 10 g oral powder for reconstitution: 10 gram(s) orally once a day  every sunday   metoclopramide 5 mg oral tablet: 1 tab(s) orally 3 times a day  nortriptyline 10 mg oral capsule: 1 cap(s) orally once a day (at bedtime)  pantoprazole 40 mg oral delayed release tablet: 1 tab(s) orally every 12 hours  Polina-Dior oral tablet: 1 tab(s) orally once a day  sevelamer carbonate 800 mg oral tablet: 3 tab(s) orally 3 times a day (with meals)  simvastatin 40 mg oral tablet: 1 tab(s) orally once a day (at bedtime)  sucralfate 1 g/10 mL oral suspension: 10 milliliter(s) orally 4 times a day  zolpidem 5 mg oral tablet: 1 tab(s) orally once a day (at bedtime), As needed, Insomnia   albuterol 90 mcg/inh inhalation aerosol: 2 puff(s) inhaled every 6 hours, As needed, Shortness of Breath and/or Wheezing  amLODIPine 10 mg oral tablet: 1 tab(s) orally once a day  cholecalciferol oral tablet: 1000 unit(s) orally once a day  ferrous sulfate 325 mg (65 mg elemental iron) oral tablet: 1 tab(s) orally every other day  folic acid 1 mg oral tablet: 1 tab(s) orally once a day  insulin glargine: 6 unit(s) subcutaneously once a day (at bedtime)   insulin lispro 100 units/mL injectable solution: 6  injectable 3 times a day  Lokelma 10 g oral powder for reconstitution: 10 gram(s) orally once a day   metoclopramide 5 mg oral tablet: 1 tab(s) orally 3 times a day  nortriptyline 10 mg oral capsule: 1 cap(s) orally once a day (at bedtime)  pantoprazole 40 mg oral delayed release tablet: 1 tab(s) orally every 12 hours  Polina-Dior oral tablet: 1 tab(s) orally once a day  sevelamer carbonate 800 mg oral tablet: 3 tab(s) orally 3 times a day (with meals)  simvastatin 40 mg oral tablet: 1 tab(s) orally once a day (at bedtime)  sucralfate 1 g/10 mL oral suspension: 10 milliliter(s) orally 4 times a day  zolpidem 5 mg oral tablet: 1 tab(s) orally once a day (at bedtime), As needed, Insomnia

## 2022-01-17 NOTE — DIETITIAN INITIAL EVALUATION ADULT. - PROBLEM SELECTOR PLAN 2
- patient is ESRD on T,Th,S schedule with missed HD session  - noted to be Hyperkalemia in ED 6.1, asymptomatic  - EKG showing sinus tachycardia at 102bpm   - s/p 1 dose of lokelma   - Nephro Dr. Mosher consulted and will perform HD tomorrow in AM  - continue with Lokelma 10grams Q 12hrs   - monitor BMP plan per MD

## 2022-01-17 NOTE — DIETITIAN INITIAL EVALUATION ADULT. - NAME AND PHONE
Pt to be followed by dietitian at skilled nursing facility and HD center when medically ready to be discharged

## 2022-01-17 NOTE — DIETITIAN INITIAL EVALUATION ADULT. - PROBLEM SELECTOR PLAN 4
- patient found to be COVID positive since 12/26  - COVID test today positive   - Isolation precautions plan per MD

## 2022-01-17 NOTE — PROGRESS NOTE ADULT - SUBJECTIVE AND OBJECTIVE BOX
Patient is a 61y old  Male who presents with a chief complaint of Hematemesis/ Hyperkalemia 2/2 to Missed HD session (17 Jan 2022 09:15)    PATIENT IS SEEN AND EXAMINED IN MEDICAL FLOOR.  MARIIT [    ]    JEREYM [   ]      GT [   ]    ALLERGIES:  fish (Rash)  liver (Anaphylaxis)  No Known Drug Allergies      Daily     Daily     VITALS:    Vital Signs Last 24 Hrs  T(C): 37.1 (17 Jan 2022 05:38), Max: 37.1 (17 Jan 2022 05:38)  T(F): 98.7 (17 Jan 2022 05:38), Max: 98.7 (17 Jan 2022 05:38)  HR: 97 (17 Jan 2022 05:38) (90 - 97)  BP: 138/77 (17 Jan 2022 05:38) (120/60 - 138/77)  BP(mean): --  RR: 18 (17 Jan 2022 05:38) (17 - 18)  SpO2: 96% (17 Jan 2022 05:38) (96% - 98%)    LABS:    CBC Full  -  ( 15 Deep 2022 17:36 )  WBC Count : 3.02 K/uL  RBC Count : 3.06 M/uL  Hemoglobin : 8.9 g/dL  Hematocrit : 26.7 %  Platelet Count - Automated : 117 K/uL  Mean Cell Volume : 87.3 fl  Mean Cell Hemoglobin : 29.1 pg  Mean Cell Hemoglobin Concentration : 33.3 gm/dL  Auto Neutrophil # : x  Auto Lymphocyte # : x  Auto Monocyte # : x  Auto Eosinophil # : x  Auto Basophil # : x  Auto Neutrophil % : x  Auto Lymphocyte % : x  Auto Monocyte % : x  Auto Eosinophil % : x  Auto Basophil % : x      01-15    133<L>  |  105  |  103<H>  ----------------------------<  255<H>  5.2   |  12<L>  |  24.90<H>    Ca    7.0<L>      15 Deep 2022 17:36  Phos  7.8     01-15  Mg     3.6     01-15    TPro  6.2  /  Alb  2.6<L>  /  TBili  0.5  /  DBili  x   /  AST  <3<L>  /  ALT  11  /  AlkPhos  72  01-15    CAPILLARY BLOOD GLUCOSE      POCT Blood Glucose.: 202 mg/dL (17 Jan 2022 07:28)  POCT Blood Glucose.: 60 mg/dL (16 Jan 2022 16:56)  POCT Blood Glucose.: 186 mg/dL (16 Jan 2022 11:26)        LIVER FUNCTIONS - ( 15 Deep 2022 17:36 )  Alb: 2.6 g/dL / Pro: 6.2 g/dL / ALK PHOS: 72 U/L / ALT: 11 U/L DA / AST: <3 U/L / GGT: x           Creatinine Trend: 24.90<--, 23.80<--, 23.70<--, 11.60<--, 14.20<--, 11.90<--  I&O's Summary          .Blood Blood-Peripheral  12-23 @ 20:58   No Growth Final  --  --      .Blood Blood-Peripheral  12-23 @ 20:56   No Growth Final  --  --          MEDICATIONS:    MEDICATIONS  (STANDING):  amLODIPine   Tablet 10 milliGRAM(s) Oral daily  chlorhexidine 2% Cloths 1 Application(s) Topical daily  diphenhydrAMINE Injectable 25 milliGRAM(s) IV Push every 12 hours  epoetin beverley-epbx (RETACRIT) Injectable 4000 Unit(s) IV Push <User Schedule>  insulin glargine Injectable (LANTUS) 4 Unit(s) SubCutaneous at bedtime  insulin lispro (ADMELOG) corrective regimen sliding scale   SubCutaneous three times a day before meals  insulin lispro (ADMELOG) corrective regimen sliding scale   SubCutaneous at bedtime  metoclopramide Injectable 5 milliGRAM(s) IV Push every 8 hours  nortriptyline 10 milliGRAM(s) Oral at bedtime  pantoprazole  Injectable 40 milliGRAM(s) IV Push two times a day  sevelamer carbonate 2400 milliGRAM(s) Oral three times a day with meals  simvastatin 40 milliGRAM(s) Oral at bedtime  sucralfate suspension 1 Gram(s) Oral four times a day      MEDICATIONS  (PRN):  acetaminophen     Tablet .. 650 milliGRAM(s) Oral every 6 hours PRN Moderate Pain (4 - 6)  ALBUTerol    90 MICROgram(s) HFA Inhaler 2 Puff(s) Inhalation every 6 hours PRN Shortness of Breath and/or Wheezing      REVIEW OF SYSTEMS:                           ALL ROS DONE [ X   ]    CONSTITUTIONAL:  LETHARGIC [   ], FEVER [   ], UNRESPONSIVE [   ]  CVS:  CP  [   ], SOB, [   ], PALPITATIONS [   ], DIZZYNESS [   ]  RS: COUGH [   ], SPUTUM [   ]  GI: ABDOMINAL PAIN [   ], NAUSEA [   ], VOMITINGS [   ], DIARRHEA [   ], CONSTIPATION [   ]  :  DYSURIA [   ], NOCTURIA [   ], INCREASED FREQUENCY [   ], DRIBLING [   ],  SKELETAL: PAINFUL JOINTS [   ], SWOLLEN JOINTS [   ], NECK ACHE [   ], LOW BACK ACHE [   ],  SKIN : ULCERS [   ], RASH [   ], ITCHING [   ]  CNS: HEAD ACHE [   ], DOUBLE VISION [   ], BLURRED VISION [   ], AMS / CONFUSION [   ], SEIZURES [   ], WEAKNESS [   ],TINGLING / NUMBNESS [   ]        PHYSICAL EXAMINATION:    GENERAL APPEARANCE: NO DISTRESS  HEENT:  NO PALLOR, NO  JVD,  NO   NODES, NECK SUPPLE  CVS: S1 +, S2 +,   RS: AEEB,  OCCASIONAL  RALES +,   NO RONCHI  ABD: SOFT, NT, NO, BS +  EXT: NO PE  SKIN: WARM,   SKELETAL:  ROM REDUCED AT CERVICAL & LS SPINE   CNS:  AAO X  2-3 , MOVES ALL LIMBS         RADIOLOGY :    < from: CT Abdomen and Pelvis No Cont (12.26.21 @ 14:07) >  IMPRESSION:  *  Mild pulmonary edema and trace bilateral pleural effusions.  *  Moderate cardiomegaly and trace pericardial effusion.  *  Thickening and patulous esophagus again noted as before.        < end of copied text >  < from: CT Chest No Cont (12.26.21 @ 14:07) >  IMPRESSION:  *  Mild pulmonary edema and trace bilateral pleural effusions.  *  Moderate cardiomegaly and trace pericardial effusion.  *  Thickening and patulous esophagus again noted as before.      < end of copied text >        ASSESSMENT :     Hematemesis      Hypertension    Adrenal insufficiency    CKD (chronic kidney disease)    Anemia    Glaucoma    Coronary artery disease    HLD (hyperlipidemia)    Peripheral vascular disease    Spinal stenosis of lumbosacral region    Hyperparathyroidism    Diabetes mellitus    Diabetic neuropathy    Contracture of hand    Osteoarthritis    Osteoporosis    Vision loss of left eye    ESRD on hemodialysis    Cataract    BPH (benign prostatic hyperplasia)    UTI (urinary tract infection)    Bladder mass    H/O hematuria    Osteoporosis    Vision loss of right eye    Depression    Chronic GERD    Osteomyelitis of vertebra    Below knee amputation status, left    History of right cataract extraction    History of left cataract extraction    S/P arteriovenous (AV) fistula creation    H/O hematuria    H/O transurethral destruction of bladder lesion    History of excision of mass      ASSESSMENT :     Hematemesis      Hypertension    Adrenal insufficiency    CKD (chronic kidney disease)    Anemia    Glaucoma    Coronary artery disease    HLD (hyperlipidemia)    Peripheral vascular disease    Spinal stenosis of lumbosacral region    Hyperparathyroidism    Diabetes mellitus    Diabetic neuropathy    Contracture of hand    Osteoarthritis    Osteoporosis    Vision loss of left eye    ESRD on hemodialysis    Cataract    BPH (benign prostatic hyperplasia)    UTI (urinary tract infection)    Bladder mass    H/O hematuria    Osteoporosis    Vision loss of right eye    Depression    Chronic GERD    Osteomyelitis of vertebra    Below knee amputation status, left    History of right cataract extraction    History of left cataract extraction    S/P arteriovenous (AV) fistula creation    H/O hematuria    H/O transurethral destruction of bladder lesion    History of excision of mass        PLAN:  HPI:  Patient is a 62 y/o male from [facility] with a past medical history of hypertension, anemia, CAD, hyperlipidemia, diabetes mellitus, GERD and adrenal insuffiencey, ESRD on HD T,Th,S reports to the ED for vomiting blood. Patient noted to  have one episode of dark vomiting and complains of nausea. Patient denies any abdominal pain, dysphagia, hematochezia or melena. Patient denies any changes to appetite or weight. Patient denies any prior upper endoscopy or prior episodes. Patient also missed dialysis yesterday. Patient, however, states he had dialysis yesterday and is scheduled for the next dialysis appointment on Saturday. Patient's dialysis schedule is T, Th, S. Patient denies any cp, sob or palpitations. Denies lower extremity swelling. Denies fevers or chills, sob or cough.    (14 Jan 2022 16:07)    PLAN:    HPI:    Patient is a 62 y/o male from [facility] with a past medical history of hypertension, anemia, CAD, hyperlipidemia, diabetes mellitus, GERD and adrenal insuffiencey, ESRD on HD T,Th,S reports to the ED for vomiting blood. Patient noted to  have one episode of dark vomiting and complains of nausea. Patient denies any abdominal pain, dysphagia, hematochezia or melena. Patient denies any changes to appetite or weight. Patient denies any prior upper endoscopy or prior episodes. Patient also missed dialysis yesterday. Patient, however, states he had dialysis yesterday and is scheduled for the next dialysis appointment on Saturday. Patient's dialysis schedule is T, Th, S. Patient denies any cp, sob or palpitations. Denies lower extremity swelling. Denies fevers or chills, sob or cough.    (14 Jan 2022 16:07)    # ADDED IV. REGLAN AND BENADRYL FOR GASTROPARESIS     AND VOMITING WITH RESOLVING SIGN AND SYMPTOMS    # RECURRENT EMESIS W/ HISTORY OF ESOPHAGITIS AND DUODENITIS [1/2021], HX OF GASTROPARESIS W/ CURRENT EVIDENCE OF THICKENED ESOPHAGUS ON CT SCAN [11/9]   - MONITORING HGB, PLACED ON PPI BID, CARAFATE, PRN ANTIEMETICS, - GASTROENTEROLOGY CONSULT  - CLD DIET PER GI  - PATIENT AGREES TO CONSIDER EGD, IF HE HAS REPEAT EMESIS HERE - COUNSELLED REGARDING MORTALITY RISK OF NOT PURSUING ENDOSCOPING EVALUATION.     # HYPERKALEMIA - GIVEN LOKELMA, F/U REPEAT POTASSIUM  - F/U EKG  - NEPHROLOGY CONSULT IN PROGRESS    # HX OF ANEMIA OF CKD    # SEVERE PROTEIN CALORIE MALNUTRITION, FAILURE TO THRIVE - NUTRITIONAL SUPPLEMENT  # ANEMIA OF CKD  # HLD  # ESRD ON HD TTS - W/ HX OF NONCOMPLIANCE - NEPHROLOGY CONSULT IN PROGRESS  # HTN  # DM   # PARTIALLY BLIND  # S/P LEFT BKA  # HX OF PVD  # LS SPINAL STENOSIS  # GI AND DVT PPX   Patient is a 61y old  Male who presents with a chief complaint of Hematemesis/ Hyperkalemia 2/2 to Missed HD session (17 Jan 2022 09:15)    PATIENT IS SEEN AND EXAMINED IN MEDICAL FLOOR.      ALLERGIES:  fish (Rash)  liver (Anaphylaxis)  No Known Drug Allergies      VITALS:    Vital Signs Last 24 Hrs  T(C): 37.1 (17 Jan 2022 05:38), Max: 37.1 (17 Jan 2022 05:38)  T(F): 98.7 (17 Jan 2022 05:38), Max: 98.7 (17 Jan 2022 05:38)  HR: 97 (17 Jan 2022 05:38) (90 - 97)  BP: 138/77 (17 Jan 2022 05:38) (120/60 - 138/77)  BP(mean): --  RR: 18 (17 Jan 2022 05:38) (17 - 18)  SpO2: 96% (17 Jan 2022 05:38) (96% - 98%)    LABS:    CBC Full  -  ( 15 Deep 2022 17:36 )  WBC Count : 3.02 K/uL  RBC Count : 3.06 M/uL  Hemoglobin : 8.9 g/dL  Hematocrit : 26.7 %  Platelet Count - Automated : 117 K/uL  Mean Cell Volume : 87.3 fl  Mean Cell Hemoglobin : 29.1 pg  Mean Cell Hemoglobin Concentration : 33.3 gm/dL  Auto Neutrophil # : x  Auto Lymphocyte # : x  Auto Monocyte # : x  Auto Eosinophil # : x  Auto Basophil # : x  Auto Neutrophil % : x  Auto Lymphocyte % : x  Auto Monocyte % : x  Auto Eosinophil % : x  Auto Basophil % : x      01-15    133<L>  |  105  |  103<H>  ----------------------------<  255<H>  5.2   |  12<L>  |  24.90<H>    Ca    7.0<L>      15 Deep 2022 17:36  Phos  7.8     01-15  Mg     3.6     01-15    TPro  6.2  /  Alb  2.6<L>  /  TBili  0.5  /  DBili  x   /  AST  <3<L>  /  ALT  11  /  AlkPhos  72  01-15    CAPILLARY BLOOD GLUCOSE      POCT Blood Glucose.: 202 mg/dL (17 Jan 2022 07:28)  POCT Blood Glucose.: 60 mg/dL (16 Jan 2022 16:56)  POCT Blood Glucose.: 186 mg/dL (16 Jan 2022 11:26)        LIVER FUNCTIONS - ( 15 Deep 2022 17:36 )  Alb: 2.6 g/dL / Pro: 6.2 g/dL / ALK PHOS: 72 U/L / ALT: 11 U/L DA / AST: <3 U/L / GGT: x           Creatinine Trend: 24.90<--, 23.80<--, 23.70<--, 11.60<--, 14.20<--, 11.90<--  I&O's Summary          .Blood Blood-Peripheral  12-23 @ 20:58   No Growth Final  --  --      .Blood Blood-Peripheral  12-23 @ 20:56   No Growth Final  --  --          MEDICATIONS:    MEDICATIONS  (STANDING):  amLODIPine   Tablet 10 milliGRAM(s) Oral daily  chlorhexidine 2% Cloths 1 Application(s) Topical daily  diphenhydrAMINE Injectable 25 milliGRAM(s) IV Push every 12 hours  epoetin beverley-epbx (RETACRIT) Injectable 4000 Unit(s) IV Push <User Schedule>  insulin glargine Injectable (LANTUS) 4 Unit(s) SubCutaneous at bedtime  insulin lispro (ADMELOG) corrective regimen sliding scale   SubCutaneous three times a day before meals  insulin lispro (ADMELOG) corrective regimen sliding scale   SubCutaneous at bedtime  metoclopramide Injectable 5 milliGRAM(s) IV Push every 8 hours  nortriptyline 10 milliGRAM(s) Oral at bedtime  pantoprazole  Injectable 40 milliGRAM(s) IV Push two times a day  sevelamer carbonate 2400 milliGRAM(s) Oral three times a day with meals  simvastatin 40 milliGRAM(s) Oral at bedtime  sucralfate suspension 1 Gram(s) Oral four times a day      MEDICATIONS  (PRN):  acetaminophen     Tablet .. 650 milliGRAM(s) Oral every 6 hours PRN Moderate Pain (4 - 6)  ALBUTerol    90 MICROgram(s) HFA Inhaler 2 Puff(s) Inhalation every 6 hours PRN Shortness of Breath and/or Wheezing      REVIEW OF SYSTEMS:                           ALL ROS DONE [ X   ]    CONSTITUTIONAL:  LETHARGIC [   ], FEVER [   ], UNRESPONSIVE [   ]  CVS:  CP  [   ], SOB, [   ], PALPITATIONS [   ], DIZZYNESS [   ]  RS: COUGH [   ], SPUTUM [   ]  GI: ABDOMINAL PAIN [   ], NAUSEA [   ], VOMITINGS [   ], DIARRHEA [   ], CONSTIPATION [   ]  :  DYSURIA [   ], NOCTURIA [   ], INCREASED FREQUENCY [   ], DRIBLING [   ],  SKELETAL: PAINFUL JOINTS [   ], SWOLLEN JOINTS [   ], NECK ACHE [   ], LOW BACK ACHE [   ],  SKIN : ULCERS [   ], RASH [   ], ITCHING [   ]  CNS: HEAD ACHE [   ], DOUBLE VISION [   ], BLURRED VISION [   ], AMS / CONFUSION [   ], SEIZURES [   ], WEAKNESS [   ],TINGLING / NUMBNESS [   ]        PHYSICAL EXAMINATION:    GENERAL APPEARANCE: NO DISTRESS  HEENT:  NO PALLOR, NO  JVD,  NO   NODES, NECK SUPPLE  CVS: S1 +, S2 +,   RS: AEEB,  OCCASIONAL  RALES +,   NO RONCHI  ABD: SOFT, NT, NO, BS +  EXT: NO PE  SKIN: WARM,   SKELETAL:  ROM REDUCED AT CERVICAL & LS SPINE   CNS:  AAO X  2-3 , MOVES ALL LIMBS         RADIOLOGY :    < from: CT Abdomen and Pelvis No Cont (12.26.21 @ 14:07) >  IMPRESSION:  *  Mild pulmonary edema and trace bilateral pleural effusions.  *  Moderate cardiomegaly and trace pericardial effusion.  *  Thickening and patulous esophagus again noted as before.        < end of copied text >  < from: CT Chest No Cont (12.26.21 @ 14:07) >  IMPRESSION:  *  Mild pulmonary edema and trace bilateral pleural effusions.  *  Moderate cardiomegaly and trace pericardial effusion.  *  Thickening and patulous esophagus again noted as before.      < end of copied text >        ASSESSMENT :     Hematemesis      Hypertension    Adrenal insufficiency    CKD (chronic kidney disease)    Anemia    Glaucoma    Coronary artery disease    HLD (hyperlipidemia)    Peripheral vascular disease    Spinal stenosis of lumbosacral region    Hyperparathyroidism    Diabetes mellitus    Diabetic neuropathy    Contracture of hand    Osteoarthritis    Osteoporosis    Vision loss of left eye    ESRD on hemodialysis    Cataract    BPH (benign prostatic hyperplasia)    UTI (urinary tract infection)    Bladder mass    H/O hematuria    Osteoporosis    Vision loss of right eye    Depression    Chronic GERD    Osteomyelitis of vertebra    Below knee amputation status, left    History of right cataract extraction    History of left cataract extraction    S/P arteriovenous (AV) fistula creation    H/O hematuria    H/O transurethral destruction of bladder lesion    History of excision of mass      ASSESSMENT :     Hematemesis      Hypertension    Adrenal insufficiency    CKD (chronic kidney disease)    Anemia    Glaucoma    Coronary artery disease    HLD (hyperlipidemia)    Peripheral vascular disease    Spinal stenosis of lumbosacral region    Hyperparathyroidism    Diabetes mellitus    Diabetic neuropathy    Contracture of hand    Osteoarthritis    Osteoporosis    Vision loss of left eye    ESRD on hemodialysis    Cataract    BPH (benign prostatic hyperplasia)    UTI (urinary tract infection)    Bladder mass    H/O hematuria    Osteoporosis    Vision loss of right eye    Depression    Chronic GERD    Osteomyelitis of vertebra    Below knee amputation status, left    History of right cataract extraction    History of left cataract extraction    S/P arteriovenous (AV) fistula creation    H/O hematuria    H/O transurethral destruction of bladder lesion    History of excision of mass        PLAN:  HPI:  Patient is a 60 y/o male from [facility] with a past medical history of hypertension, anemia, CAD, hyperlipidemia, diabetes mellitus, GERD and adrenal insuffiencey, ESRD on HD T,Th,S reports to the ED for vomiting blood. Patient noted to  have one episode of dark vomiting and complains of nausea. Patient denies any abdominal pain, dysphagia, hematochezia or melena. Patient denies any changes to appetite or weight. Patient denies any prior upper endoscopy or prior episodes. Patient also missed dialysis yesterday. Patient, however, states he had dialysis yesterday and is scheduled for the next dialysis appointment on Saturday. Patient's dialysis schedule is T, Th, S. Patient denies any cp, sob or palpitations. Denies lower extremity swelling. Denies fevers or chills, sob or cough.    (14 Jan 2022 16:07)    PLAN:    HPI:    Patient is a 60 y/o male from [facility] with a past medical history of hypertension, anemia, CAD, hyperlipidemia, diabetes mellitus, GERD and adrenal insuffiencey, ESRD on HD T,Th,S reports to the ED for vomiting blood. Patient noted to  have one episode of dark vomiting and complains of nausea. Patient denies any abdominal pain, dysphagia, hematochezia or melena. Patient denies any changes to appetite or weight. Patient denies any prior upper endoscopy or prior episodes. Patient also missed dialysis yesterday. Patient, however, states he had dialysis yesterday and is scheduled for the next dialysis appointment on Saturday. Patient's dialysis schedule is T, Th, S. Patient denies any cp, sob or palpitations. Denies lower extremity swelling. Denies fevers or chills, sob or cough.    (14 Jan 2022 16:07)    - COUNSELLED ONCE MORE ON THE IMPORTANCE OF ENDOSCOPIC EVALUATION, BLOOD WORK, COMPLETE HD SESSIONS. PATIENT VERBALIZED UNDERSTANDING REGARDING RISKS INCLUDE BUT ARE NOT LIMITED TO ARRYTHMIA, BLEEDING AND DEATH.     # ADDED IV. REGLAN AND BENADRYL FOR GASTROPARESIS     AND VOMITING WITH RESOLVING SIGN AND SYMPTOMS    # RECURRENT EMESIS W/ HISTORY OF ESOPHAGITIS AND DUODENITIS [1/2021], HX OF GASTROPARESIS W/ CURRENT EVIDENCE OF THICKENED ESOPHAGUS ON CT SCAN [11/9]   - MONITORING HGB, PLACED ON PPI BID, CARAFATE, PRN ANTIEMETICS, - GASTROENTEROLOGY CONSULT  - ADVANCED DIET PER GI  - PATIENT AGREES TO CONSIDER EGD, IF HE HAS REPEAT EMESIS HERE - COUNSELLED REGARDING MORTALITY RISK OF NOT PURSUING ENDOSCOPING EVALUATION.     # HYPERKALEMIA - GIVEN LOKELMA, REPEAT POTASSIUM IMPROVED ON 1/15  - EKG ORDERED  - NEPHROLOGY CONSULT IN PROGRESS    # HX OF ANEMIA OF CKD    # SEVERE PROTEIN CALORIE MALNUTRITION, FAILURE TO THRIVE - NUTRITIONAL SUPPLEMENT  # ANEMIA OF CKD  # HLD  # ESRD ON HD TTS - W/ HX OF NONCOMPLIANCE - NEPHROLOGY CONSULT IN PROGRESS  # HTN  # DM   # PARTIALLY BLIND  # S/P LEFT BKA  # HX OF PVD  # LS SPINAL STENOSIS  # GI AND DVT PPX

## 2022-01-17 NOTE — DIETITIAN INITIAL EVALUATION ADULT. - FACTORS AFF FOOD INTAKE
acute on chronic comorbidities including ESRD on HD, Covid19 infection, depression, vision loss,/persistent lack of appetite/Rastafarian/ethnic/cultural/personal food preferences/other (specify) acute on chronic comorbidities including ESRD on HD, Covid19 infection, depression, vision loss,/difficulty with food procurement/preparation/persistent lack of appetite/Shinto/ethnic/cultural/personal food preferences/other (specify)

## 2022-01-17 NOTE — DIETITIAN INITIAL EVALUATION ADULT. - ETIOLOGY
comorbidities including ESRD on HD, Covid19 infection, depression, vision loss; individual/personal food choices

## 2022-01-17 NOTE — DISCHARGE NOTE PROVIDER - NSDCFUSCHEDAPPT_GEN_ALL_CORE_FT
KIAN VALERO ; 01/24/2022 ; [facility] [address] ; 01/24/2022 ; [facility] [address] ; 02/09/2022 ; NP Derm [id number]

## 2022-01-17 NOTE — DISCHARGE NOTE PROVIDER - CARE PROVIDER_API CALL
Dario De La Torre)  Medicine  [phone] [place] [address]  Phone: [phone]  Fax: [phone]  Follow Up Time: 1 week    Marcos Mosher)  Internal Medicine; Nephrology  [phone] [place] [address]  Phone: [phone]  Fax: [phone]  Follow Up Time: 1 week

## 2022-01-17 NOTE — DIETITIAN INITIAL EVALUATION ADULT. - PROBLEM SELECTOR PLAN 3
- patient noted to be on HD T, Th, Sat  - will get HD session tomorrow in AM as per Dr. Cruz  - continue with sevelamer and lokelma  - Neprology Dr. Mosher consulted plan per MD

## 2022-01-17 NOTE — DIETITIAN INITIAL EVALUATION ADULT. - PHYSCIAL ASSESSMENT
skin intact   Most recent wt in EMR: 124.1 lb 12/24/21; Current wt=not available   Ht=5' 6"  Adjusted IBW for Left QEE=424.5 lb

## 2022-01-17 NOTE — DISCHARGE NOTE PROVIDER - HOSPITAL COURSE
Patient is a 60 y/o male from [facility] with a past medical history of hypertension, anemia, CAD, hyperlipidemia, diabetes mellitus, GERD and adrenal insuffiencey, ESRD on HD T,Th,S reports to the ED for hematemesis. In ED, patient noted to be tachycardic to 109, but BP of 144/79, Hgb 9.5. Patient admitted for hematemesis and hyperkalemia. Patient was also found to be COVID positive on admission   Hgb noted to be 9.5 on admission prior 11.2, no active bleeding noted, started on clear diet, PPI and Carafate suspension, followed by GI Dr. Lee, pt was recommended for EGD however refused EGD, coffee ground emesis likely due to recurring esophagitis vs MW tear due in setting of uremia due to frequently missed dialysis.  further GI recs ???????????????????????????????????????  Pt was also found to have  Hyperkalemia, received Nilesh reid Dr. followed and HD was continued, pt remained non compliant with HD timing and lab draws during admission despite education by multiple team members   Pt was also found to have COVID 19, but remained asymptomatic and did not require any treatment    NOT COMPLETE, UPDATED 1/17  Please note that this a brief summary of hospital course please refer to daily progress notes and consult notes for full course and events   Patient is a 60 y/o male from [facility] with a past medical history of hypertension, anemia, CAD, hyperlipidemia, diabetes mellitus, GERD and adrenal insuffiencey, ESRD on HD T,Th,S reports to the ED for hematemesis. In ED, patient noted to be tachycardic to 109, but BP of 144/79, Hgb 9.5. Patient admitted for hematemesis and hyperkalemia. Patient was also found to be COVID positive on admission   Hgb noted to be 9.5 on admission prior 11.2, no active bleeding noted, started on clear diet, PPI and Carafate suspension, followed by GI Dr. Lee, pt was recommended for EGD however refused EGD, coffee ground emesis likely due to recurring esophagitis vs MW tear due in setting of uremia due to frequently missed dialysis. CBC remained around pt's baseline. no further episodes of bleeding, GI recommended no further recs at this time   Pt was also found to have  Hyperkalemia, received Nilesh reid Dr. followed and HD was continued, pt remained non compliant with HD timing and lab draws during admission despite education by multiple team members   Pt  agreeable for HD later on during the hospital stay and received dialysis on 1/19  Pt was also found to have COVID 19, but remained asymptomatic and did not require any treatment, testing positive since dec   Please note that this a brief summary of hospital course please refer to daily progress notes and consult notes for full course and events

## 2022-01-17 NOTE — DIETITIAN INITIAL EVALUATION ADULT. - PROBLEM SELECTOR PLAN 1
- patient presented with hematemesis  - Hgb noted to be 9.5 prior 11.2   - no acitve bleeding noted in ED, but blood around mouth   - will give clear liquid diet as tolerated, if bleeding develops will place NPO  - PPI BID IV   - Carafate suspension BID   - GI Dr. Molina consulted with plan for possible EGD  - Monitor CBC q8hrs   - Monitor signs for bleeding  - hold Aspirin and chemical DVT prophylaxis plan per MD

## 2022-01-17 NOTE — DIETITIAN INITIAL EVALUATION ADULT. - DIET TYPE
Nepro 1can bid daily as medically feasible ( 850 kcal, 38 g protein) Nepro 1can daily as medically feasible ( 425 kcal, 19 g protein)

## 2022-01-18 DIAGNOSIS — N18.9 CHRONIC KIDNEY DISEASE, UNSPECIFIED: ICD-10-CM

## 2022-01-18 DIAGNOSIS — Z91.15 PATIENT'S NONCOMPLIANCE WITH RENAL DIALYSIS: ICD-10-CM

## 2022-01-18 DIAGNOSIS — Z02.9 ENCOUNTER FOR ADMINISTRATIVE EXAMINATIONS, UNSPECIFIED: ICD-10-CM

## 2022-01-18 DIAGNOSIS — Z71.89 OTHER SPECIFIED COUNSELING: ICD-10-CM

## 2022-01-18 DIAGNOSIS — E11.9 TYPE 2 DIABETES MELLITUS WITHOUT COMPLICATIONS: ICD-10-CM

## 2022-01-18 LAB
GLUCOSE BLDC GLUCOMTR-MCNC: 157 MG/DL — HIGH (ref 70–99)
GLUCOSE BLDC GLUCOMTR-MCNC: 179 MG/DL — HIGH (ref 70–99)

## 2022-01-18 RX ORDER — SODIUM ZIRCONIUM CYCLOSILICATE 10 G/10G
10 POWDER, FOR SUSPENSION ORAL ONCE
Refills: 0 | Status: DISCONTINUED | OUTPATIENT
Start: 2022-01-18 | End: 2022-01-18

## 2022-01-18 RX ORDER — SODIUM ZIRCONIUM CYCLOSILICATE 10 G/10G
10 POWDER, FOR SUSPENSION ORAL DAILY
Refills: 0 | Status: DISCONTINUED | OUTPATIENT
Start: 2022-01-18 | End: 2022-01-20

## 2022-01-18 RX ADMIN — Medication 650 MILLIGRAM(S): at 21:30

## 2022-01-18 RX ADMIN — Medication 650 MILLIGRAM(S): at 05:37

## 2022-01-18 RX ADMIN — SIMVASTATIN 40 MILLIGRAM(S): 20 TABLET, FILM COATED ORAL at 21:52

## 2022-01-18 RX ADMIN — PANTOPRAZOLE SODIUM 40 MILLIGRAM(S): 20 TABLET, DELAYED RELEASE ORAL at 19:24

## 2022-01-18 RX ADMIN — CHLORHEXIDINE GLUCONATE 1 APPLICATION(S): 213 SOLUTION TOPICAL at 13:18

## 2022-01-18 RX ADMIN — Medication 1 GRAM(S): at 05:37

## 2022-01-18 RX ADMIN — Medication 1 GRAM(S): at 13:19

## 2022-01-18 RX ADMIN — Medication 1 GRAM(S): at 19:24

## 2022-01-18 RX ADMIN — NORTRIPTYLINE HYDROCHLORIDE 10 MILLIGRAM(S): 10 CAPSULE ORAL at 21:39

## 2022-01-18 RX ADMIN — SEVELAMER CARBONATE 2400 MILLIGRAM(S): 2400 POWDER, FOR SUSPENSION ORAL at 13:19

## 2022-01-18 NOTE — PROGRESS NOTE ADULT - SUBJECTIVE AND OBJECTIVE BOX
Patient is a 61y old  Male who presents with a chief complaint of Hematemesis/ Hyperkalemia 2/2 to Missed HD session (18 Jan 2022 09:48)    OVERNIGHT EVENTS: pt remains compliant with dialysis and lab work, reported 1 episode of vomiting this AM     REVIEW OF SYSTEMS: limited due to pt's participation   RESPIRATORY: No cough, SOB  CARDIOVASCULAR: No chest pain, palpitations  GASTROINTESTINAL: No abdominal pain. No nausea, +ve vomiting  NEUROLOGICAL: No HA      T(C): 36.9 (01-17-22 @ 20:58), Max: 37 (01-17-22 @ 13:27)  HR: 98 (01-17-22 @ 20:58) (98 - 100)  BP: 110/63 (01-17-22 @ 20:58) (110/63 - 147/81)  RR: 18 (01-17-22 @ 20:58) (18 - 18)  SpO2: 95% (01-17-22 @ 20:58) (95% - 98%)  Wt(kg): --Vital Signs Last 24 Hrs  T(C): 36.9 (17 Jan 2022 20:58), Max: 37 (17 Jan 2022 13:27)  T(F): 98.5 (17 Jan 2022 20:58), Max: 98.6 (17 Jan 2022 13:27)  HR: 98 (17 Jan 2022 20:58) (98 - 100)  BP: 110/63 (17 Jan 2022 20:58) (110/63 - 147/81)  BP(mean): --  RR: 18 (17 Jan 2022 20:58) (18 - 18)  SpO2: 95% (17 Jan 2022 20:58) (95% - 98%)    MEDICATIONS  (STANDING):  amLODIPine   Tablet 10 milliGRAM(s) Oral daily  chlorhexidine 2% Cloths 1 Application(s) Topical daily  diphenhydrAMINE Injectable 25 milliGRAM(s) IV Push every 12 hours  epoetin beverley-epbx (RETACRIT) Injectable 4000 Unit(s) IV Push <User Schedule>  insulin glargine Injectable (LANTUS) 4 Unit(s) SubCutaneous at bedtime  insulin lispro (ADMELOG) corrective regimen sliding scale   SubCutaneous three times a day before meals  insulin lispro (ADMELOG) corrective regimen sliding scale   SubCutaneous at bedtime  metoclopramide Injectable 5 milliGRAM(s) IV Push every 8 hours  nortriptyline 10 milliGRAM(s) Oral at bedtime  pantoprazole  Injectable 40 milliGRAM(s) IV Push two times a day  sevelamer carbonate 2400 milliGRAM(s) Oral three times a day with meals  simvastatin 40 milliGRAM(s) Oral at bedtime  sucralfate suspension 1 Gram(s) Oral four times a day    MEDICATIONS  (PRN):  acetaminophen     Tablet .. 650 milliGRAM(s) Oral every 6 hours PRN Moderate Pain (4 - 6)  ALBUTerol    90 MICROgram(s) HFA Inhaler 2 Puff(s) Inhalation every 6 hours PRN Shortness of Breath and/or Wheezing      PHYSICAL EXAM:  GENERAL: frail   EYES: legally blind   ENMT: Moist mucous membranes  NECK: Supple, No JVD  CHEST/LUNG: Clear to auscultation bilaterally; No rales, rhonchi, wheezing, or rubs  HEART: S1, S2, Regular rate and rhythm  ABDOMEN: Soft, Nontender, Nondistended; Bowel sounds present  NEURO: Alert & Oriented X3  EXTREMITIES: L BKA     Consultant(s) Notes Reviewed:  [x ] YES  [ ] NO  Care Discussed with Consultants/Other Providers [ x] YES  [ ] NO    LABS: refused blood work     CAPILLARY BLOOD GLUCOSE  POCT Blood Glucose.: 157 mg/dL (18 Jan 2022 11:06)  POCT Blood Glucose.: 179 mg/dL (18 Jan 2022 07:41)  POCT Blood Glucose.: 169 mg/dL (17 Jan 2022 22:00)  POCT Blood Glucose.: 215 mg/dL (17 Jan 2022 16:54)    RADIOLOGY & ADDITIONAL TESTS:        Imaging Personally Reviewed:  [ ] YES  [ ] NO

## 2022-01-18 NOTE — PROGRESS NOTE ADULT - SUBJECTIVE AND OBJECTIVE BOX
[facility] : Progress Note :: [phone], (office [phone]),   Dr Mosher / Dr Washington / Dr Young / Dr Doll / Dr Varsha TURCIOS / Dr Tello / Dr Garcia / Dr Larry qiu  _____________________________________________________________________________________________  REFUSED TO GO TO SCHEDULED DIALYSIS  HE REFUSES TO ANSWER QUESTIONS OR PARTICIPATE IN PHYSICAL EXAM     fish (Rash)  liver (Anaphylaxis)  No Known Drug Allergies    Hospital Medications:   MEDICATIONS  (STANDING):  amLODIPine   Tablet 10 milliGRAM(s) Oral daily  chlorhexidine 2% Cloths 1 Application(s) Topical daily  diphenhydrAMINE Injectable 25 milliGRAM(s) IV Push every 12 hours  epoetin beverley-epbx (RETACRIT) Injectable 4000 Unit(s) IV Push <User Schedule>  insulin glargine Injectable (LANTUS) 4 Unit(s) SubCutaneous at bedtime  insulin lispro (ADMELOG) corrective regimen sliding scale   SubCutaneous three times a day before meals  insulin lispro (ADMELOG) corrective regimen sliding scale   SubCutaneous at bedtime  metoclopramide Injectable 5 milliGRAM(s) IV Push every 8 hours  nortriptyline 10 milliGRAM(s) Oral at bedtime  pantoprazole  Injectable 40 milliGRAM(s) IV Push two times a day  sevelamer carbonate 2400 milliGRAM(s) Oral three times a day with meals  simvastatin 40 milliGRAM(s) Oral at bedtime  sodium zirconium cyclosilicate 10 Gram(s) Oral daily  sucralfate suspension 1 Gram(s) Oral four times a day      VITALS:  T(F): 98.1 (01-18-22 @ 13:46), Max: 98.5 (01-17-22 @ 20:58)  HR: 90 (01-18-22 @ 13:46)  BP: 158/83 (01-18-22 @ 13:46)  RR: 18 (01-18-22 @ 13:46)  SpO2: 97% (01-18-22 @ 13:46)  Wt(kg): --      PHYSICAL EXAM:  No acute distress.  refuses physical exam       LABS:        Creatinine Trend: 24.90 <--, 23.80 <--, 23.70 <--    Urine Studies:      RADIOLOGY & ADDITIONAL STUDIES:

## 2022-01-18 NOTE — PROGRESS NOTE ADULT - PROBLEM SELECTOR PLAN 11
-spoke to pt about adverse outcomes of refusing dialysis and GI work up, also spoke to pt's HCP/brother Georgi King [phone] at bedside via speaker phone, discussed pt's current clinical status, treatment plan/recommendations, after discussion with pt's brother, pt is agreeable to have HD and further GI work up   -pt's brother/HCP wants to be called if pt becomes non compliant with recommended treatment

## 2022-01-18 NOTE — PROGRESS NOTE ADULT - SUBJECTIVE AND OBJECTIVE BOX
Patient is a 61y old  Male who presents with a chief complaint of Hematemesis/ Hyperkalemia 2/2 to Missed HD session (17 Jan 2022 14:29)    PATIENT IS SEEN AND EXAMINED IN MEDICAL FLOOR.  NGT [    ]    JEREMY [   ]      GT [   ]    ALLERGIES:  fish (Rash)  liver (Anaphylaxis)  No Known Drug Allergies      Daily     Daily     VITALS:    Vital Signs Last 24 Hrs  T(C): 36.9 (17 Jan 2022 20:58), Max: 37 (17 Jan 2022 13:27)  T(F): 98.5 (17 Jan 2022 20:58), Max: 98.6 (17 Jan 2022 13:27)  HR: 98 (17 Jan 2022 20:58) (98 - 100)  BP: 110/63 (17 Jan 2022 20:58) (110/63 - 147/81)  BP(mean): --  RR: 18 (17 Jan 2022 20:58) (18 - 18)  SpO2: 95% (17 Jan 2022 20:58) (95% - 98%)    LABS:              CAPILLARY BLOOD GLUCOSE      POCT Blood Glucose.: 179 mg/dL (18 Jan 2022 07:41)  POCT Blood Glucose.: 169 mg/dL (17 Jan 2022 22:00)  POCT Blood Glucose.: 215 mg/dL (17 Jan 2022 16:54)  POCT Blood Glucose.: 329 mg/dL (17 Jan 2022 11:07)          Creatinine Trend: 24.90<--, 23.80<--, 23.70<--, 11.60<--, 14.20<--, 11.90<--  I&O's Summary          .Blood Blood-Peripheral  12-23 @ 20:58   No Growth Final  --  --      .Blood Blood-Peripheral  12-23 @ 20:56   No Growth Final  --  --          MEDICATIONS:    MEDICATIONS  (STANDING):  amLODIPine   Tablet 10 milliGRAM(s) Oral daily  chlorhexidine 2% Cloths 1 Application(s) Topical daily  diphenhydrAMINE Injectable 25 milliGRAM(s) IV Push every 12 hours  epoetin beverley-epbx (RETACRIT) Injectable 4000 Unit(s) IV Push <User Schedule>  insulin glargine Injectable (LANTUS) 4 Unit(s) SubCutaneous at bedtime  insulin lispro (ADMELOG) corrective regimen sliding scale   SubCutaneous three times a day before meals  insulin lispro (ADMELOG) corrective regimen sliding scale   SubCutaneous at bedtime  metoclopramide Injectable 5 milliGRAM(s) IV Push every 8 hours  nortriptyline 10 milliGRAM(s) Oral at bedtime  pantoprazole  Injectable 40 milliGRAM(s) IV Push two times a day  sevelamer carbonate 2400 milliGRAM(s) Oral three times a day with meals  simvastatin 40 milliGRAM(s) Oral at bedtime  sucralfate suspension 1 Gram(s) Oral four times a day      MEDICATIONS  (PRN):  acetaminophen     Tablet .. 650 milliGRAM(s) Oral every 6 hours PRN Moderate Pain (4 - 6)  ALBUTerol    90 MICROgram(s) HFA Inhaler 2 Puff(s) Inhalation every 6 hours PRN Shortness of Breath and/or Wheezing      REVIEW OF SYSTEMS:                           ALL ROS DONE [ X   ]    CONSTITUTIONAL:  LETHARGIC [   ], FEVER [   ], UNRESPONSIVE [   ]  CVS:  CP  [   ], SOB, [   ], PALPITATIONS [   ], DIZZYNESS [   ]  RS: COUGH [   ], SPUTUM [   ]  GI: ABDOMINAL PAIN [   ], NAUSEA [   ], VOMITINGS [   ], DIARRHEA [   ], CONSTIPATION [   ]  :  DYSURIA [   ], NOCTURIA [   ], INCREASED FREQUENCY [   ], DRIBLING [   ],  SKELETAL: PAINFUL JOINTS [   ], SWOLLEN JOINTS [   ], NECK ACHE [   ], LOW BACK ACHE [   ],  SKIN : ULCERS [   ], RASH [   ], ITCHING [   ]  CNS: HEAD ACHE [   ], DOUBLE VISION [   ], BLURRED VISION [   ], AMS / CONFUSION [   ], SEIZURES [   ], WEAKNESS [   ],TINGLING / NUMBNESS [   ]    PHYSICAL EXAMINATION:  GENERAL APPEARANCE: NO DISTRESS  HEENT:  NO PALLOR, NO  JVD,  NO   NODES, NECK SUPPLE  CVS: S1 +, S2 +,   RS: AEEB,  OCCASIONAL  RALES +,   NO RONCHI  ABD: SOFT, NT, NO, BS +  EXT: NO PE   , LEFT BKA +  SKIN: WARM, RIGHT UPPER EXTREMITY AF +  SKELETAL:  ROM ACCEPTABLE  CNS:  AAO X 3,  VISUALLY IMPAIRED +    RADIOLOGY :    < from: CT Abdomen and Pelvis No Cont (12.26.21 @ 14:07) >  IMPRESSION:  *  Mild pulmonary edema and trace bilateral pleural effusions.  *  Moderate cardiomegaly and trace pericardial effusion.  *  Thickening and patulous esophagus again noted as before.        < end of copied text >  < from: CT Chest No Cont (12.26.21 @ 14:07) >  IMPRESSION:  *  Mild pulmonary edema and trace bilateral pleural effusions.  *  Moderate cardiomegaly and trace pericardial effusion.  *  Thickening and patulous esophagus again noted as before.      < end of copied text >        ASSESSMENT :     Hematemesis      Hypertension    Adrenal insufficiency    CKD (chronic kidney disease)    Anemia    Glaucoma    Coronary artery disease    HLD (hyperlipidemia)    Peripheral vascular disease    Spinal stenosis of lumbosacral region    Hyperparathyroidism    Diabetes mellitus    Diabetic neuropathy    Contracture of hand    Osteoarthritis    Osteoporosis    Vision loss of left eye    ESRD on hemodialysis    Cataract    BPH (benign prostatic hyperplasia)    UTI (urinary tract infection)    Bladder mass    H/O hematuria    Osteoporosis    Vision loss of right eye    Depression    Chronic GERD    Osteomyelitis of vertebra    Below knee amputation status, left    History of right cataract extraction    History of left cataract extraction    S/P arteriovenous (AV) fistula creation    H/O hematuria    H/O transurethral destruction of bladder lesion    History of excision of mass        PLAN:  HPI:  Patient is a 62 y/o male from [facility] with a past medical history of hypertension, anemia, CAD, hyperlipidemia, diabetes mellitus, GERD and adrenal insuffiencey, ESRD on HD T,Th,S reports to the ED for vomiting blood. Patient noted to  have one episode of dark vomiting and complains of nausea. Patient denies any abdominal pain, dysphagia, hematochezia or melena. Patient denies any changes to appetite or weight. Patient denies any prior upper endoscopy or prior episodes. Patient also missed dialysis yesterday. Patient, however, states he had dialysis yesterday and is scheduled for the next dialysis appointment on Saturday. Patient's dialysis schedule is T, Th, S. Patient denies any cp, sob or palpitations. Denies lower extremity swelling. Denies fevers or chills, sob or cough.    (14 Jan 2022 16:07)      - COUNSELLED ONCE MORE ON THE IMPORTANCE OF ENDOSCOPIC EVALUATION, BLOOD WORK, COMPLETE HD SESSIONS. PATIENT VERBALIZED UNDERSTANDING REGARDING RISKS INCLUDE BUT ARE NOT LIMITED TO ARRYTHMIA, BLEEDING AND DEATH.     # ADDED IV. REGLAN AND BENADRYL FOR GASTROPARESIS     AND VOMITING WITH RESOLVING SIGN AND SYMPTOMS    # RECURRENT EMESIS W/ HISTORY OF ESOPHAGITIS AND DUODENITIS [1/2021], HX OF GASTROPARESIS W/ CURRENT EVIDENCE OF THICKENED ESOPHAGUS ON CT SCAN [11/9]   - MONITORING HGB, PLACED ON PPI BID, CARAFATE, PRN ANTIEMETICS, - GASTROENTEROLOGY CONSULT  - ADVANCED DIET PER GI  - PATIENT AGREES TO CONSIDER EGD, IF HE HAS REPEAT EMESIS HERE - COUNSELLED REGARDING MORTALITY RISK OF NOT PURSUING ENDOSCOPING EVALUATION.     # HYPERKALEMIA - GIVEN LOKELMA, REPEAT POTASSIUM IMPROVED ON 1/15  - EKG ORDERED  - NEPHROLOGY CONSULT IN PROGRESS    # HX OF ANEMIA OF CKD    # SEVERE PROTEIN CALORIE MALNUTRITION, FAILURE TO THRIVE - NUTRITIONAL SUPPLEMENT  # ANEMIA OF CKD  # HLD  # ESRD ON HD TTS - W/ HX OF NONCOMPLIANCE - NEPHROLOGY CONSULT IN PROGRESS  # HTN  # DM   # PARTIALLY BLIND  # S/P LEFT BKA  # HX OF PVD  # LS SPINAL STENOSIS  # GI AND DVT PPX   Patient is a 61y old  Male who presents with a chief complaint of Hematemesis/ Hyperkalemia 2/2 to Missed HD session (17 Jan 2022 14:29)    PATIENT IS SEEN AND EXAMINED IN MEDICAL FLOOR.  NGT [    ]    JEREMY [   ]      GT [   ]    ALLERGIES:  fish (Rash)  liver (Anaphylaxis)  No Known Drug Allergies      Daily     Daily     VITALS:    Vital Signs Last 24 Hrs  T(C): 36.9 (17 Jan 2022 20:58), Max: 37 (17 Jan 2022 13:27)  T(F): 98.5 (17 Jan 2022 20:58), Max: 98.6 (17 Jan 2022 13:27)  HR: 98 (17 Jan 2022 20:58) (98 - 100)  BP: 110/63 (17 Jan 2022 20:58) (110/63 - 147/81)  BP(mean): --  RR: 18 (17 Jan 2022 20:58) (18 - 18)  SpO2: 95% (17 Jan 2022 20:58) (95% - 98%)    LABS:              CAPILLARY BLOOD GLUCOSE      POCT Blood Glucose.: 179 mg/dL (18 Jan 2022 07:41)  POCT Blood Glucose.: 169 mg/dL (17 Jan 2022 22:00)  POCT Blood Glucose.: 215 mg/dL (17 Jan 2022 16:54)  POCT Blood Glucose.: 329 mg/dL (17 Jan 2022 11:07)          Creatinine Trend: 24.90<--, 23.80<--, 23.70<--, 11.60<--, 14.20<--, 11.90<--  I&O's Summary          .Blood Blood-Peripheral  12-23 @ 20:58   No Growth Final  --  --      .Blood Blood-Peripheral  12-23 @ 20:56   No Growth Final  --  --          MEDICATIONS:    MEDICATIONS  (STANDING):  amLODIPine   Tablet 10 milliGRAM(s) Oral daily  chlorhexidine 2% Cloths 1 Application(s) Topical daily  diphenhydrAMINE Injectable 25 milliGRAM(s) IV Push every 12 hours  epoetin beverley-epbx (RETACRIT) Injectable 4000 Unit(s) IV Push <User Schedule>  insulin glargine Injectable (LANTUS) 4 Unit(s) SubCutaneous at bedtime  insulin lispro (ADMELOG) corrective regimen sliding scale   SubCutaneous three times a day before meals  insulin lispro (ADMELOG) corrective regimen sliding scale   SubCutaneous at bedtime  metoclopramide Injectable 5 milliGRAM(s) IV Push every 8 hours  nortriptyline 10 milliGRAM(s) Oral at bedtime  pantoprazole  Injectable 40 milliGRAM(s) IV Push two times a day  sevelamer carbonate 2400 milliGRAM(s) Oral three times a day with meals  simvastatin 40 milliGRAM(s) Oral at bedtime  sucralfate suspension 1 Gram(s) Oral four times a day      MEDICATIONS  (PRN):  acetaminophen     Tablet .. 650 milliGRAM(s) Oral every 6 hours PRN Moderate Pain (4 - 6)  ALBUTerol    90 MICROgram(s) HFA Inhaler 2 Puff(s) Inhalation every 6 hours PRN Shortness of Breath and/or Wheezing      REVIEW OF SYSTEMS:                           ALL ROS DONE [ X   ]    CONSTITUTIONAL:  LETHARGIC [   ], FEVER [   ], UNRESPONSIVE [   ]  CVS:  CP  [   ], SOB, [   ], PALPITATIONS [   ], DIZZYNESS [   ]  RS: COUGH [   ], SPUTUM [   ]  GI: ABDOMINAL PAIN [   ], NAUSEA [   ], VOMITINGS [   ], DIARRHEA [   ], CONSTIPATION [   ]  :  DYSURIA [   ], NOCTURIA [   ], INCREASED FREQUENCY [   ], DRIBLING [   ],  SKELETAL: PAINFUL JOINTS [   ], SWOLLEN JOINTS [   ], NECK ACHE [   ], LOW BACK ACHE [   ],  SKIN : ULCERS [   ], RASH [   ], ITCHING [   ]  CNS: HEAD ACHE [   ], DOUBLE VISION [   ], BLURRED VISION [   ], AMS / CONFUSION [   ], SEIZURES [   ], WEAKNESS [   ],TINGLING / NUMBNESS [   ]    PHYSICAL EXAMINATION:  GENERAL APPEARANCE: NO DISTRESS  HEENT:  NO PALLOR, NO  JVD,  NO   NODES, NECK SUPPLE  CVS: S1 +, S2 +,   RS: AEEB,  OCCASIONAL  RALES +,   NO RONCHI  ABD: SOFT, NT, NO, BS +  EXT: NO PE   , LEFT BKA +  SKIN: WARM, RIGHT UPPER EXTREMITY AF +  SKELETAL:  ROM ACCEPTABLE  CNS:  AAO X 3,  VISUALLY IMPAIRED +    RADIOLOGY :    < from: CT Abdomen and Pelvis No Cont (12.26.21 @ 14:07) >  IMPRESSION:  *  Mild pulmonary edema and trace bilateral pleural effusions.  *  Moderate cardiomegaly and trace pericardial effusion.  *  Thickening and patulous esophagus again noted as before.        < end of copied text >  < from: CT Chest No Cont (12.26.21 @ 14:07) >  IMPRESSION:  *  Mild pulmonary edema and trace bilateral pleural effusions.  *  Moderate cardiomegaly and trace pericardial effusion.  *  Thickening and patulous esophagus again noted as before.      < end of copied text >        ASSESSMENT :     Hematemesis      Hypertension    Adrenal insufficiency    CKD (chronic kidney disease)    Anemia    Glaucoma    Coronary artery disease    HLD (hyperlipidemia)    Peripheral vascular disease    Spinal stenosis of lumbosacral region    Hyperparathyroidism    Diabetes mellitus    Diabetic neuropathy    Contracture of hand    Osteoarthritis    Osteoporosis    Vision loss of left eye    ESRD on hemodialysis    Cataract    BPH (benign prostatic hyperplasia)    UTI (urinary tract infection)    Bladder mass    H/O hematuria    Osteoporosis    Vision loss of right eye    Depression    Chronic GERD    Osteomyelitis of vertebra    Below knee amputation status, left    History of right cataract extraction    History of left cataract extraction    S/P arteriovenous (AV) fistula creation    H/O hematuria    H/O transurethral destruction of bladder lesion    History of excision of mass        PLAN:  HPI:  Patient is a 62 y/o male from [facility] with a past medical history of hypertension, anemia, CAD, hyperlipidemia, diabetes mellitus, GERD and adrenal insuffiencey, ESRD on HD T,Th,S reports to the ED for vomiting blood. Patient noted to  have one episode of dark vomiting and complains of nausea. Patient denies any abdominal pain, dysphagia, hematochezia or melena. Patient denies any changes to appetite or weight. Patient denies any prior upper endoscopy or prior episodes. Patient also missed dialysis yesterday. Patient, however, states he had dialysis yesterday and is scheduled for the next dialysis appointment on Saturday. Patient's dialysis schedule is T, Th, S. Patient denies any cp, sob or palpitations. Denies lower extremity swelling. Denies fevers or chills, sob or cough.    (14 Jan 2022 16:07)      - COUNSELLED ONCE MORE ON THE IMPORTANCE OF ENDOSCOPIC EVALUATION, BLOOD WORK, COMPLETE HD SESSIONS. PATIENT VERBALIZED UNDERSTANDING REGARDING RISKS INCLUDE BUT ARE NOT LIMITED TO ARRYTHMIA, BLEEDING AND DEATH. PATIENT'S BROTHER ALSO DISCUSSED WITH PATIENT, WHO SUBSEQUENTLY AGREED TO HD AND EGD [REFUSED THIS A.M. INITIALLY] - PLANNED FOR HD TOMORROW PER NEPHROLOGY. WILL COORDINATE WITH GASTROENTEROLOGY REGARDING THEIR RECOMMENDATIONS.   - PALLIATIVE CARE CONSULT IN PROGRESS    # ADDED IV. REGLAN AND BENADRYL FOR GASTROPARESIS     AND VOMITING - PATIENT WITHE EPISODE OF EMESIS IN A.M.    # RECURRENT EMESIS W/ HISTORY OF ESOPHAGITIS AND DUODENITIS [1/2021], HX OF GASTROPARESIS W/ CURRENT EVIDENCE OF THICKENED ESOPHAGUS ON CT SCAN [11/9]   - MONITORING HGB, PLACED ON PPI BID, CARAFATE, PRN ANTIEMETICS, - GASTROENTEROLOGY CONSULT  - ADVANCED DIET PER GI  - PATIENT AGREES TO CONSIDER EGD, IF HE HAS REPEAT EMESIS HERE - COUNSELLED REGARDING MORTALITY RISK OF NOT PURSUING ENDOSCOPING EVALUATION.     # HYPERKALEMIA - GIVEN LOKELMA, REPEAT POTASSIUM IMPROVED ON 1/15  - EKG ORDERED  - NEPHROLOGY CONSULT IN PROGRESS    # HX OF ANEMIA OF CKD    # SEVERE PROTEIN CALORIE MALNUTRITION, FAILURE TO THRIVE - NUTRITIONAL SUPPLEMENT  # ANEMIA OF CKD  # HLD  # ESRD ON HD TTS - W/ HX OF NONCOMPLIANCE - NEPHROLOGY CONSULT IN PROGRESS  # HTN  # DM   # PARTIALLY BLIND  # S/P LEFT BKA  # HX OF PVD  # LS SPINAL STENOSIS  # GI AND DVT PPX

## 2022-01-19 LAB
ALBUMIN SERPL ELPH-MCNC: 2.4 G/DL — LOW (ref 3.5–5)
ALP SERPL-CCNC: 104 U/L — SIGNIFICANT CHANGE UP (ref 40–120)
ALT FLD-CCNC: 20 U/L DA — SIGNIFICANT CHANGE UP (ref 10–60)
ANION GAP SERPL CALC-SCNC: 19 MMOL/L — HIGH (ref 5–17)
AST SERPL-CCNC: 6 U/L — LOW (ref 10–40)
BILIRUB SERPL-MCNC: 0.4 MG/DL — SIGNIFICANT CHANGE UP (ref 0.2–1.2)
BUN SERPL-MCNC: 135 MG/DL — HIGH (ref 7–18)
CALCIUM SERPL-MCNC: 6.8 MG/DL — LOW (ref 8.4–10.5)
CHLORIDE SERPL-SCNC: 97 MMOL/L — SIGNIFICANT CHANGE UP (ref 96–108)
CO2 SERPL-SCNC: 13 MMOL/L — LOW (ref 22–31)
CREAT SERPL-MCNC: 21.4 MG/DL — HIGH (ref 0.5–1.3)
GLUCOSE BLDC GLUCOMTR-MCNC: 135 MG/DL — HIGH (ref 70–99)
GLUCOSE SERPL-MCNC: 226 MG/DL — HIGH (ref 70–99)
HCT VFR BLD CALC: 25.1 % — LOW (ref 39–50)
HGB BLD-MCNC: 8.3 G/DL — LOW (ref 13–17)
MAGNESIUM SERPL-MCNC: 3.5 MG/DL — HIGH (ref 1.6–2.6)
MCHC RBC-ENTMCNC: 28.4 PG — SIGNIFICANT CHANGE UP (ref 27–34)
MCHC RBC-ENTMCNC: 33.1 GM/DL — SIGNIFICANT CHANGE UP (ref 32–36)
MCV RBC AUTO: 86 FL — SIGNIFICANT CHANGE UP (ref 80–100)
NRBC # BLD: 0 /100 WBCS — SIGNIFICANT CHANGE UP (ref 0–0)
PHOSPHATE SERPL-MCNC: 6.7 MG/DL — HIGH (ref 2.5–4.5)
PLATELET # BLD AUTO: 74 K/UL — LOW (ref 150–400)
POTASSIUM SERPL-MCNC: 5.9 MMOL/L — HIGH (ref 3.5–5.3)
POTASSIUM SERPL-SCNC: 5.9 MMOL/L — HIGH (ref 3.5–5.3)
PROT SERPL-MCNC: 5.8 G/DL — LOW (ref 6–8.3)
RBC # BLD: 2.92 M/UL — LOW (ref 4.2–5.8)
RBC # FLD: 20.7 % — HIGH (ref 10.3–14.5)
SODIUM SERPL-SCNC: 129 MMOL/L — LOW (ref 135–145)
WBC # BLD: 2.08 K/UL — LOW (ref 3.8–10.5)
WBC # FLD AUTO: 2.08 K/UL — LOW (ref 3.8–10.5)

## 2022-01-19 RX ADMIN — Medication 650 MILLIGRAM(S): at 16:05

## 2022-01-19 RX ADMIN — SIMVASTATIN 40 MILLIGRAM(S): 20 TABLET, FILM COATED ORAL at 21:17

## 2022-01-19 RX ADMIN — NORTRIPTYLINE HYDROCHLORIDE 10 MILLIGRAM(S): 10 CAPSULE ORAL at 21:17

## 2022-01-19 RX ADMIN — ERYTHROPOIETIN 4000 UNIT(S): 10000 INJECTION, SOLUTION INTRAVENOUS; SUBCUTANEOUS at 09:33

## 2022-01-19 RX ADMIN — Medication 650 MILLIGRAM(S): at 16:45

## 2022-01-19 NOTE — PROGRESS NOTE ADULT - SUBJECTIVE AND OBJECTIVE BOX
Patient is a 61y old  Male who presents with a chief complaint of Hematemesis/ Hyperkalemia 2/2 to Missed HD session (18 Jan 2022 15:18)    OVERNIGHT EVENTS: no acute events overnight, s/p HD today       REVIEW OF SYSTEMS: limited due to pt's participation   RESPIRATORY: No cough, SOB  CARDIOVASCULAR: No chest pain, palpitations  GASTROINTESTINAL: No abdominal pain. No nausea, no vomiting  NEUROLOGICAL: No HA        T(C): 36.9 (01-19-22 @ 12:15), Max: 36.9 (01-19-22 @ 12:15)  HR: 100 (01-19-22 @ 12:15) (91 - 100)  BP: 143/89 (01-19-22 @ 12:15) (143/89 - 156/80)  RR: 17 (01-19-22 @ 12:15) (17 - 19)  SpO2: 99% (01-19-22 @ 12:15) (98% - 100%)  Wt(kg): --Vital Signs Last 24 Hrs  T(C): 36.9 (19 Jan 2022 12:15), Max: 36.9 (19 Jan 2022 12:15)  T(F): 98.4 (19 Jan 2022 12:15), Max: 98.4 (19 Jan 2022 12:15)  HR: 100 (19 Jan 2022 12:15) (91 - 100)  BP: 143/89 (19 Jan 2022 12:15) (143/89 - 156/80)  BP(mean): --  RR: 17 (19 Jan 2022 12:15) (17 - 19)  SpO2: 99% (19 Jan 2022 12:15) (98% - 100%)    MEDICATIONS  (STANDING):  amLODIPine   Tablet 10 milliGRAM(s) Oral daily  chlorhexidine 2% Cloths 1 Application(s) Topical daily  diphenhydrAMINE Injectable 25 milliGRAM(s) IV Push every 12 hours  epoetin beverley-epbx (RETACRIT) Injectable 4000 Unit(s) IV Push <User Schedule>  insulin glargine Injectable (LANTUS) 4 Unit(s) SubCutaneous at bedtime  insulin lispro (ADMELOG) corrective regimen sliding scale   SubCutaneous three times a day before meals  insulin lispro (ADMELOG) corrective regimen sliding scale   SubCutaneous at bedtime  metoclopramide Injectable 5 milliGRAM(s) IV Push every 8 hours  nortriptyline 10 milliGRAM(s) Oral at bedtime  pantoprazole  Injectable 40 milliGRAM(s) IV Push two times a day  sevelamer carbonate 2400 milliGRAM(s) Oral three times a day with meals  simvastatin 40 milliGRAM(s) Oral at bedtime  sodium zirconium cyclosilicate 10 Gram(s) Oral daily  sucralfate suspension 1 Gram(s) Oral four times a day    MEDICATIONS  (PRN):  acetaminophen     Tablet .. 650 milliGRAM(s) Oral every 6 hours PRN Moderate Pain (4 - 6)  ALBUTerol    90 MICROgram(s) HFA Inhaler 2 Puff(s) Inhalation every 6 hours PRN Shortness of Breath and/or Wheezing      PHYSICAL EXAM:  GENERAL: frail   EYES: legally blind   ENMT: Moist mucous membranes  NECK: Supple, No JVD  CHEST/LUNG: Clear to auscultation bilaterally; No rales, rhonchi, wheezing, or rubs  HEART: S1, S2, Regular rate and rhythm  ABDOMEN: Soft, Nontender, Nondistended; Bowel sounds present  NEURO: Alert & Oriented X3  EXTREMITIES: L BKA       Consultant(s) Notes Reviewed:  [x ] YES  [ ] NO  Care Discussed with Consultants/Other Providers [ x] YES  [ ] NO    LABS:                        8.3    2.08  )-----------( 74       ( 19 Jan 2022 09:39 )             25.1     01-19    129<L>  |  97  |  135<H>  ----------------------------<  226<H>  5.9<H>   |  13<L>  |  21.40<H>    Ca    6.8<L>      19 Jan 2022 09:39  Phos  6.7     01-19  Mg     3.5     01-19    TPro  5.8<L>  /  Alb  2.4<L>  /  TBili  0.4  /  DBili  x   /  AST  6<L>  /  ALT  20  /  AlkPhos  104  01-19    CAPILLARY BLOOD GLUCOSE  POCT Blood Glucose.: 135 mg/dL (19 Jan 2022 10:57)    RADIOLOGY & ADDITIONAL TESTS:        Imaging Personally Reviewed:  [ ] YES  [ ] NO

## 2022-01-19 NOTE — PHYSICAL THERAPY INITIAL EVALUATION ADULT - ACTIVE RANGE OF MOTION EXAMINATION, REHAB EVAL
Left hip and knee/bilateral upper extremity Active ROM was WFL (within functional limits)/Right LE Active ROM was WFL (within functional limits)

## 2022-01-19 NOTE — PROGRESS NOTE ADULT - PROBLEM SELECTOR PLAN 3
-anemia of ESRD and suspected upper GI bleed   -cont Retacrit with HD   -repeat CBC today around pt's baseline   -mon CBC
-anemia of ESRD and suspected upper GI bleed   -cont Retacrit with HD   -pt remains non compliant with blood work, will repeat CBC with HD today   -mon CBC

## 2022-01-19 NOTE — PROGRESS NOTE ADULT - PROBLEM SELECTOR PLAN 7
-hx of CAD  -cont to hold ASA for suspected GIB   -cont statin
-hx of CAD  -cont to hold ASA for suspected GIB   -cont statin

## 2022-01-19 NOTE — PHYSICAL THERAPY INITIAL EVALUATION ADULT - PERTINENT HX OF CURRENT PROBLEM, REHAB EVAL
past medical history of hypertension, anemia, CAD, hyperlipidemia, diabetes mellitus, GERD and adrenal insuffiencey, ESRD on HD T,Th,S reports to the ED for vomiting blood.

## 2022-01-19 NOTE — PROGRESS NOTE ADULT - REASON FOR ADMISSION
Hematemesis/ Hyperkalemia 2/2 to Missed HD session

## 2022-01-19 NOTE — PROGRESS NOTE ADULT - PROBLEM SELECTOR PLAN 5
-due to frequently missed HD   -unable to monitor since pt is refusing blood work   -will attempt to obtain blood work with HD today
-due to frequently missed HD   -s/p HD today

## 2022-01-19 NOTE — PROGRESS NOTE ADULT - PROBLEM SELECTOR PLAN 8
-testing positive since Dec 2021, asymptomatic   -off isolation
-testing positive since Dec 2021, asymptomatic   -off isolation

## 2022-01-19 NOTE — PROGRESS NOTE ADULT - PROVIDER SPECIALTY LIST ADULT
Gastroenterology
Internal Medicine
Nephrology
Internal Medicine
Internal Medicine
Nephrology
Nephrology
Internal Medicine
Nephrology
Nephrology
Internal Medicine

## 2022-01-19 NOTE — PROGRESS NOTE ADULT - PROBLEM SELECTOR PLAN 6
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

## 2022-01-19 NOTE — PROGRESS NOTE ADULT - PROBLEM SELECTOR PLAN 4
-episodes of coffee ground emesis at facility, likely due to recurring esophagitis vs MW tear due to nausea and vomiting  in setting of uremia due to frequently missed dialysis.  -no further reports of coffee ground emesis since admission   -cont PPI and Carafate   -as per GI no need for endoscopy this admission    -GI Dr. Lee
-episodes of coffee ground emesis at facility, likely due to recurring esophagitis vs MW tear due to nausea and vomiting  in setting of uremia due to frequently missed dialysis.  -no further reports of coffee ground emesis since admission   -cont PPI and Carafate   -unable to trend CBC due to pt refusing blood work   -will attempt to obtain CBC with HD today   -pt now agreeable for endoscopy   -GI Dr. Lee

## 2022-01-19 NOTE — PROGRESS NOTE ADULT - ASSESSMENT
A/P  ESRD ON  HD  REFUSED HD YESTERDAY    AGREES T OGO FOR  HD  AFTER LUNCH  TODAY    HAD  HIGH K, -  SEC TO MISSING HD AND GI  BLEED    ON AYAN WITH HD    H/O  NON  COMPLIANCE  WITH HIS DIALYSIS SCHEDULE AND  MEDS INTAKE   GOT ONLY  20MINS OF  HD ON   THURSDAY,  INSISTED TO BE RINSED BACK  EARLY  SPOKE WITH  HD  NURSE AS WELL,  IS ON  SCHEDULE FOR TODAY   EDUCATED PT ON  THE RISKS  OF MISSING HD  AND RISKS OF HIGH K AND VOL  EXCESS
Patient is a 61y Male whom presented to the hospital with hematemesis.   Has H/O ESRD on HD TTS at [facility], He was admitted in this hospital with COVID-19 PNA and has been getting dialysis at [facility] isolation unit, but has been missing dialysis treatments. Was last dialysed on Tuesday.  Presented to ED with vomiting  blood. Hyperkalemia 6.1 S/P LOKELMA.   Other PMHX as below.    # ESRD. cut HD on Saturday . HD was scheduled but has refused.  HD in AM. compliance with HD discussed.  # anemia of CKD  EPOGEN ON hemodialysis   # Hypertension . BP controlled 
Patient is a 61y Male whom presented to the hospital with hematemesis.   Has H/O ESRD on HD TTS at [facility], He was admitted in this hospital with COVID-19 PNA and has been getting dialysis at [facility] isolation unit, but has been missing dialysis treatments. Was last dialysed on Tuesday.  Presented to ED with vomiting  blood. Hyperkalemia 6.1 S/P LOKELMA.   Other PMHX as below.    # ESRD. refusing dialysis. a chronic problem.  he is aware of risk of cardiac arrest and death from hyperkalemia and other metabolic complications of refusing dialysis.   encourage compliance with recommended medical care including dialysis.  noted. palliative care consulted to determine goals of care  [facility]  # anemia of CKD  EPOGEN when he goes to hemodialysis   
- Coffee ground emesis.  - Anemia.  - Renal failure.    No further emesis today. Suspect likely recurring esophagitis vs MW tear (recent EGD 1 yr prior w/ LA grade D) in setting of n/v from missed dialysis/uremia/hyperkalemia. Treat with carafate slurry TID x7 days, PPI BID. Patient not interested in repeat EGD. 
A/P  ESRD ON  HD  GOT 1  HR  HD   ON  SAT   DEMANDED TO BE RINSED BACK  AFTER 1  HR    PRE  HD  K  WAS OK  5.2    BICARB  WAS LOW  AT  12     REPEAT BMP   THIS  AM,  IF  BICARB LESS  THAN  20 ,  START  SODIUM  BICARB-  650MG PO BID  FO R 24  HRS    WILL  SCHEDULE FOR HD IN  AM AGAIN      EDUCATED PT  AGAIN ON THE RISKS OF   CUTTING HIS  TIME ,    HYPERKALEMIA AND  ACIDOSIS   IS  BAING MANAGED  FOR  COVID INFECTION
Patient is a 61y Male whom presented to the hospital with hematemesis.   Has H/O ESRD on HD TTS at [facility], He was admitted in this hospital with COVID-19 PNA and has been getting dialysis at [facility] isolation unit, but has been missing dialysis treatments. Was last dialysed on Tuesday.  Presented to ED with vomiting  blood.  Has been refusing dialysis during this hospital stay    # ESRD. agree to HD had 2 hrs and 15 minutes of HD   cont lokelma.   cont sevelamer  # anemia of CKD  EPOGEN when he goes to hemodialysis   D/C planning  
62 y/o male from [facility] with a past medical history of hypertension, anemia, CAD, hyperlipidemia, diabetes mellitus, GERD and adrenal insuffiencey, ESRD on HD T,Th,S reports to the ED for hematemesis.  Patient admitted for GI bleed and hyperkalemia. Patient was also found to be COVID positive on admission, but remained asymptomatic and did not require any treatment  Hgb noted to be 9.5 on admission prior 11.2, no active bleeding noted, started on clear diet, PPI and Carafate suspension, followed by ONEL Lee, pt was recommended for EGD however refused EGD, coffee ground emesis likely due to recurring esophagitis vs MW tear due to nausea and vomiting  in setting of uremia due to frequently missed dialysis.  Nephro Dr. Mosher followed and HD was continued, pt remained non compliant with HD timing and lab draws during admission despite education by multiple team members   Pt s/p HD today, as per ONEL Lee EGD not indicated at this time. For discharge back to assisted living tomorrow     
62 y/o male from [facility] with a past medical history of hypertension, anemia, CAD, hyperlipidemia, diabetes mellitus, GERD and adrenal insuffiencey, ESRD on HD T,Th,S reports to the ED for hematemesis.  Patient admitted for GI bleed and hyperkalemia. Patient was also found to be COVID positive on admission, but remained asymptomatic and did not require any treatment  Hgb noted to be 9.5 on admission prior 11.2, no active bleeding noted, started on clear diet, PPI and Carafate suspension, followed by GI Dr. Lee, pt was recommended for EGD however refused EGD, coffee ground emesis likely due to recurring esophagitis vs MW tear due to nausea and vomiting  in setting of uremia due to frequently missed dialysis.  Nephro Dr. Mosher followed and HD was continued, pt remained non compliant with HD timing and lab draws during admission despite education by multiple team members   Pt refused dialysis earlier today, now agreeable after speaking to his brother and agreeable for EGD if indicated

## 2022-01-19 NOTE — PROGRESS NOTE ADULT - SUBJECTIVE AND OBJECTIVE BOX
[facility] : Progress Note :: [phone], (office [phone]),   Dr Mosher / Dr Washington / Dr Young / Dr Doll / Dr Varsha TURCIOS / Dr Tello / Dr Garcia / Dr Larry qiu  _____________________________________________________________________________________________    agreed to HD today, but only had 2 hrs and 15 minutes of HD     fish (Rash)  liver (Anaphylaxis)  No Known Drug Allergies    Hospital Medications:   MEDICATIONS  (STANDING):  amLODIPine   Tablet 10 milliGRAM(s) Oral daily  chlorhexidine 2% Cloths 1 Application(s) Topical daily  diphenhydrAMINE Injectable 25 milliGRAM(s) IV Push every 12 hours  epoetin beverley-epbx (RETACRIT) Injectable 4000 Unit(s) IV Push <User Schedule>  insulin glargine Injectable (LANTUS) 4 Unit(s) SubCutaneous at bedtime  insulin lispro (ADMELOG) corrective regimen sliding scale   SubCutaneous three times a day before meals  insulin lispro (ADMELOG) corrective regimen sliding scale   SubCutaneous at bedtime  metoclopramide Injectable 5 milliGRAM(s) IV Push every 8 hours  nortriptyline 10 milliGRAM(s) Oral at bedtime  pantoprazole  Injectable 40 milliGRAM(s) IV Push two times a day  sevelamer carbonate 2400 milliGRAM(s) Oral three times a day with meals  simvastatin 40 milliGRAM(s) Oral at bedtime  sodium zirconium cyclosilicate 10 Gram(s) Oral daily  sucralfate suspension 1 Gram(s) Oral four times a day        VITALS:  T(F): 98.4 (01-19-22 @ 12:15), Max: 98.4 (01-19-22 @ 12:15)  HR: 100 (01-19-22 @ 12:15)  BP: 143/89 (01-19-22 @ 12:15)  RR: 17 (01-19-22 @ 12:15)  SpO2: 99% (01-19-22 @ 12:15)  Wt(kg): --    01-18 @ 07:01  -  01-19 @ 07:00  --------------------------------------------------------  IN: 120 mL / OUT: 0 mL / NET: 120 mL    01-19 @ 07:01  -  01-19 @ 14:58  --------------------------------------------------------  IN: 600 mL / OUT: 2349 mL / NET: -1749 mL        PHYSICAL EXAM:  Constitutional: NAD  HEENT: anicteric sclera, oropharynx clear, MMM  Neck: No JVD  Respiratory: CTAB, no wheezes, rales or rhonchi  Cardiovascular: S1, S2, RRR  Gastrointestinal: BS+, soft, NT/ND  Extremities: No peripheral edema  Neurological: A/O x 3, no focal deficits  Vascular Access: AVF with thrill l and bruit    LABS:  01-19    129<L>  |  97  |  135<H>  ----------------------------<  226<H>  5.9<H>   |  13<L>  |  21.40<H>    Ca    6.8<L>      19 Jan 2022 09:39  Phos  6.7     01-19  Mg     3.5     01-19    TPro  5.8<L>  /  Alb  2.4<L>  /  TBili  0.4  /  DBili      /  AST  6<L>  /  ALT  20  /  AlkPhos  104  01-19    Creatinine Trend: 21.40 <--, 24.90 <--, 23.80 <--, 23.70 <--                        8.3    2.08  )-----------( 74       ( 19 Jan 2022 09:39 )             25.1     Urine Studies:      RADIOLOGY & ADDITIONAL STUDIES:

## 2022-01-19 NOTE — PROGRESS NOTE ADULT - SUBJECTIVE AND OBJECTIVE BOX
`Patient is a 61y old  Male who presents with a chief complaint of Hematemesis/ Hyperkalemia 2/2 to Missed HD session (2022 14:58)    PATIENT IS SEEN AND EXAMINED IN MEDICAL FLOOR.  SULTANA [    ]    JEREMY [   ]      GT [   ]    ALLERGIES:  fish (Rash)  liver (Anaphylaxis)  No Known Drug Allergies      Daily     Daily Weight in k (2022 12:15)    VITALS:    Vital Signs Last 24 Hrs  T(C): 36.9 (2022 12:15), Max: 36.9 (2022 12:15)  T(F): 98.4 (2022 12:15), Max: 98.4 (2022 12:15)  HR: 88 (2022 15:06) (88 - 100)  BP: 143/89 (2022 12:15) (143/89 - 156/80)  BP(mean): --  RR: 17 (2022 12:15) (17 - 19)  SpO2: 98% (2022 15:06) (98% - 100%)    LABS:    CBC Full  -  ( 2022 09:39 )  WBC Count : 2.08 K/uL  RBC Count : 2.92 M/uL  Hemoglobin : 8.3 g/dL  Hematocrit : 25.1 %  Platelet Count - Automated : 74 K/uL  Mean Cell Volume : 86.0 fl  Mean Cell Hemoglobin : 28.4 pg  Mean Cell Hemoglobin Concentration : 33.1 gm/dL  Auto Neutrophil # : x  Auto Lymphocyte # : x  Auto Monocyte # : x  Auto Eosinophil # : x  Auto Basophil # : x  Auto Neutrophil % : x  Auto Lymphocyte % : x  Auto Monocyte % : x  Auto Eosinophil % : x  Auto Basophil % : x          129<L>  |  97  |  135<H>  ----------------------------<  226<H>  5.9<H>   |  13<L>  |  21.40<H>    Ca    6.8<L>      2022 09:39  Phos  6.7       Mg     3.5         TPro  5.8<L>  /  Alb  2.4<L>  /  TBili  0.4  /  DBili  x   /  AST  6<L>  /  ALT  20  /  AlkPhos  104      CAPILLARY BLOOD GLUCOSE      POCT Blood Glucose.: 135 mg/dL (2022 10:57)        LIVER FUNCTIONS - ( 2022 09:39 )  Alb: 2.4 g/dL / Pro: 5.8 g/dL / ALK PHOS: 104 U/L / ALT: 20 U/L DA / AST: 6 U/L / GGT: x           Creatinine Trend: 21.40<--, 24.90<--, 23.80<--, 23.70<--, 11.60<--, 14.20<--  I&O's Summary    2022 07:01  -  2022 07:00  --------------------------------------------------------  IN: 120 mL / OUT: 0 mL / NET: 120 mL    2022 07:01  -  2022 17:04  --------------------------------------------------------  IN: 600 mL / OUT: 2349 mL / NET: -1749 mL            .Blood Blood-Peripheral   @ 20:58   No Growth Final  --  --      .Blood Blood-Peripheral   @ 20:56   No Growth Final  --  --          MEDICATIONS:    MEDICATIONS  (STANDING):  amLODIPine   Tablet 10 milliGRAM(s) Oral daily  chlorhexidine 2% Cloths 1 Application(s) Topical daily  diphenhydrAMINE Injectable 25 milliGRAM(s) IV Push every 12 hours  epoetin beverley-epbx (RETACRIT) Injectable 4000 Unit(s) IV Push <User Schedule>  insulin glargine Injectable (LANTUS) 4 Unit(s) SubCutaneous at bedtime  insulin lispro (ADMELOG) corrective regimen sliding scale   SubCutaneous three times a day before meals  insulin lispro (ADMELOG) corrective regimen sliding scale   SubCutaneous at bedtime  metoclopramide Injectable 5 milliGRAM(s) IV Push every 8 hours  nortriptyline 10 milliGRAM(s) Oral at bedtime  pantoprazole  Injectable 40 milliGRAM(s) IV Push two times a day  sevelamer carbonate 2400 milliGRAM(s) Oral three times a day with meals  simvastatin 40 milliGRAM(s) Oral at bedtime  sodium zirconium cyclosilicate 10 Gram(s) Oral daily  sucralfate suspension 1 Gram(s) Oral four times a day      MEDICATIONS  (PRN):  acetaminophen     Tablet .. 650 milliGRAM(s) Oral every 6 hours PRN Moderate Pain (4 - 6)  ALBUTerol    90 MICROgram(s) HFA Inhaler 2 Puff(s) Inhalation every 6 hours PRN Shortness of Breath and/or Wheezing      REVIEW OF SYSTEMS:                           ALL ROS DONE [ X   ]    CONSTITUTIONAL:  LETHARGIC [   ], FEVER [   ], UNRESPONSIVE [   ]  CVS:  CP  [   ], SOB, [   ], PALPITATIONS [   ], DIZZYNESS [   ]  RS: COUGH [   ], SPUTUM [   ]  GI: ABDOMINAL PAIN [   ], NAUSEA [   ], VOMITINGS [   ], DIARRHEA [   ], CONSTIPATION [   ]  :  DYSURIA [   ], NOCTURIA [   ], INCREASED FREQUENCY [   ], DRIBLING [   ],  SKELETAL: PAINFUL JOINTS [   ], SWOLLEN JOINTS [   ], NECK ACHE [   ], LOW BACK ACHE [   ],  SKIN : ULCERS [   ], RASH [   ], ITCHING [   ]  CNS: HEAD ACHE [   ], DOUBLE VISION [   ], BLURRED VISION [   ], AMS / CONFUSION [   ], SEIZURES [   ], WEAKNESS [   ],TINGLING / NUMBNESS [   ]    PHYSICAL EXAMINATION:  GENERAL APPEARANCE: NO DISTRESS  HEENT:  NO PALLOR, NO  JVD,  NO   NODES, NECK SUPPLE  CVS: S1 +, S2 +,   RS: AEEB,  OCCASIONAL  RALES +,   NO RONCHI  ABD: SOFT, NT, NO, BS +  EXT: NO PE   , LEFT BKA +  SKIN: WARM, RIGHT UPPER EXTREMITY AF +  SKELETAL:  ROM ACCEPTABLE  CNS:  AAO X 3,  VISUALLY IMPAIRED +    RADIOLOGY :    < from: CT Abdomen and Pelvis No Cont (21 @ 14:07) >  IMPRESSION:  *  Mild pulmonary edema and trace bilateral pleural effusions.  *  Moderate cardiomegaly and trace pericardial effusion.  *  Thickening and patulous esophagus again noted as before.        < end of copied text >  < from: CT Chest No Cont (21 @ 14:07) >  IMPRESSION:  *  Mild pulmonary edema and trace bilateral pleural effusions.  *  Moderate cardiomegaly and trace pericardial effusion.  *  Thickening and patulous esophagus again noted as before.      < end of copied text >        ASSESSMENT :     Hematemesis      Hypertension    Adrenal insufficiency    CKD (chronic kidney disease)    Anemia    Glaucoma    Coronary artery disease    HLD (hyperlipidemia)    Peripheral vascular disease    Spinal stenosis of lumbosacral region    Hyperparathyroidism    Diabetes mellitus    Diabetic neuropathy    Contracture of hand    Osteoarthritis    Osteoporosis    Vision loss of left eye    ESRD on hemodialysis    Cataract    BPH (benign prostatic hyperplasia)    UTI (urinary tract infection)    Bladder mass    H/O hematuria    Osteoporosis    Vision loss of right eye    Depression    Chronic GERD    Osteomyelitis of vertebra    Below knee amputation status, left    History of right cataract extraction    History of left cataract extraction    S/P arteriovenous (AV) fistula creation    H/O hematuria    H/O transurethral destruction of bladder lesion    History of excision of mass        PLAN:  HPI:  Patient is a 62 y/o male from [facility] with a past medical history of hypertension, anemia, CAD, hyperlipidemia, diabetes mellitus, GERD and adrenal insuffiencey, ESRD on HD T,,S reports to the ED for vomiting blood. Patient noted to  have one episode of dark vomiting and complains of nausea. Patient denies any abdominal pain, dysphagia, hematochezia or melena. Patient denies any changes to appetite or weight. Patient denies any prior upper endoscopy or prior episodes. Patient also missed dialysis yesterday. Patient, however, states he had dialysis yesterday and is scheduled for the next dialysis appointment on Saturday. Patient's dialysis schedule is , , S. Patient denies any cp, sob or palpitations. Denies lower extremity swelling. Denies fevers or chills, sob or cough.    (2022 16:07)      - COUNSELLED ONCE MORE ON THE IMPORTANCE OF ENDOSCOPIC EVALUATION, BLOOD WORK, COMPLETE HD SESSIONS. PATIENT VERBALIZED UNDERSTANDING REGARDING RISKS INCLUDE BUT ARE NOT LIMITED TO ARRYTHMIA, BLEEDING AND DEATH. PATIENT'S BROTHER ALSO DISCUSSED WITH PATIENT, WHO SUBSEQUENTLY AGREED TO HD AND EGD [REFUSED INITIALLY] - PLANNED FOR HD  PER NEPHROLOGY. WILL COORDINATE WITH GASTROENTEROLOGY REGARDING THEIR RECOMMENDATIONS.   - PALLIATIVE CARE CONSULT - DISCUSSED CASE W/ PATIENT    # RECURRENT EMESIS W/ HISTORY OF ESOPHAGITIS AND DUODENITIS [2021], HX OF GASTROPARESIS W/ CURRENT EVIDENCE OF THICKENED ESOPHAGUS ON CT SCAN []   - IV. REGLAN AND BENADRYL FOR GASTROPARESIS  AND VOMITING  - MONITORING HGB, PLACED ON PPI BID, CARAFATE, PRN ANTIEMETICS, - GASTROENTEROLOGY CONSULT  - ADVANCED DIET PER GI  - PATIENT AGREES TO CONSIDER EGD, IF HE HAS REPEAT EMESIS HERE - COUNSELLED REGARDING MORTALITY RISK OF NOT PURSUING ENDOSCOPIC EVALUATION    - CASE D/W GASTROENTEROLOGY TEAM - NO RECOMMENDATION FOR EGD     # HYPERKALEMIA - GIVEN LOKELMA, REPEAT POTASSIUM IMPROVED ON 1/15  - EKG ORDERED  - NEPHROLOGY CONSULT IN PROGRESS    # HX OF ANEMIA OF CKD    # SEVERE PROTEIN CALORIE MALNUTRITION, FAILURE TO THRIVE - NUTRITIONAL SUPPLEMENT  # ANEMIA OF CKD  # HLD  # ESRD ON HD TTS - W/ HX OF NONCOMPLIANCE - NEPHROLOGY CONSULT IN PROGRESS  # HTN  # DM   # PARTIALLY BLIND  # S/P LEFT BKA  # HX OF PVD  # LS SPINAL STENOSIS  # GI AND DVT PPX

## 2022-01-19 NOTE — PROGRESS NOTE ADULT - PROBLEM SELECTOR PLAN 1
-pt on dialysis TTS, however is non compliant with HD and the duration despite ongoing education on risks and benefits by multiple team members   -pt with recurrent vomiting likely due to uremia   -s/p HD today   -cont Renvela   -Nephro Dr. Mosher
-pt on dialysis TTS, however is non compliant with HD and the duration despite ongoing education on risks and benefits by multiple team members   -pt with recurrent vomiting likely due to uremia   -initially refused HD this AM,  agreeable for HD later on today    -cont Renvela   -Nephro Dr. Mosher

## 2022-01-20 ENCOUNTER — TRANSCRIPTION ENCOUNTER (OUTPATIENT)
Age: 62
End: 2022-01-20

## 2022-01-20 VITALS
HEART RATE: 103 BPM | TEMPERATURE: 98 F | RESPIRATION RATE: 17 BRPM | DIASTOLIC BLOOD PRESSURE: 80 MMHG | OXYGEN SATURATION: 98 % | SYSTOLIC BLOOD PRESSURE: 148 MMHG

## 2022-01-20 LAB
GLUCOSE BLDC GLUCOMTR-MCNC: 101 MG/DL — HIGH (ref 70–99)
GLUCOSE BLDC GLUCOMTR-MCNC: 247 MG/DL — HIGH (ref 70–99)

## 2022-01-20 PROCEDURE — 80053 COMPREHEN METABOLIC PANEL: CPT

## 2022-01-20 PROCEDURE — 99285 EMERGENCY DEPT VISIT HI MDM: CPT | Mod: 25

## 2022-01-20 PROCEDURE — 96375 TX/PRO/DX INJ NEW DRUG ADDON: CPT

## 2022-01-20 PROCEDURE — 80048 BASIC METABOLIC PNL TOTAL CA: CPT

## 2022-01-20 PROCEDURE — 83735 ASSAY OF MAGNESIUM: CPT

## 2022-01-20 PROCEDURE — 84100 ASSAY OF PHOSPHORUS: CPT

## 2022-01-20 PROCEDURE — 36415 COLL VENOUS BLD VENIPUNCTURE: CPT

## 2022-01-20 PROCEDURE — 85025 COMPLETE CBC W/AUTO DIFF WBC: CPT

## 2022-01-20 PROCEDURE — 96374 THER/PROPH/DIAG INJ IV PUSH: CPT

## 2022-01-20 PROCEDURE — 87641 MR-STAPH DNA AMP PROBE: CPT

## 2022-01-20 PROCEDURE — 87635 SARS-COV-2 COVID-19 AMP PRB: CPT

## 2022-01-20 PROCEDURE — 93005 ELECTROCARDIOGRAM TRACING: CPT

## 2022-01-20 PROCEDURE — 99261: CPT

## 2022-01-20 PROCEDURE — 83690 ASSAY OF LIPASE: CPT

## 2022-01-20 PROCEDURE — 85027 COMPLETE CBC AUTOMATED: CPT

## 2022-01-20 PROCEDURE — 87640 STAPH A DNA AMP PROBE: CPT

## 2022-01-20 PROCEDURE — 82962 GLUCOSE BLOOD TEST: CPT

## 2022-01-20 RX ORDER — ONDANSETRON 8 MG/1
8 TABLET, FILM COATED ORAL EVERY 8 HOURS
Refills: 0 | Status: DISCONTINUED | OUTPATIENT
Start: 2022-01-20 | End: 2022-01-20

## 2022-01-20 RX ORDER — SODIUM ZIRCONIUM CYCLOSILICATE 10 G/10G
10 POWDER, FOR SUSPENSION ORAL
Qty: 300 | Refills: 0
Start: 2022-01-20 | End: 2022-02-18

## 2022-01-20 RX ADMIN — Medication 1 GRAM(S): at 02:36

## 2022-01-20 RX ADMIN — AMLODIPINE BESYLATE 10 MILLIGRAM(S): 2.5 TABLET ORAL at 05:16

## 2022-01-20 RX ADMIN — Medication 1 GRAM(S): at 05:16

## 2022-01-20 NOTE — DISCHARGE NOTE NURSING/CASE MANAGEMENT/SOCIAL WORK - NSDCPEFALRISK_GEN_ALL_CORE
For information on Fall & Injury Prevention, visit: [email or web address] OR  [email or web address] OR  https://www.cdc.gov/steadi/patient.html

## 2022-01-20 NOTE — CHART NOTE - NSCHARTNOTEFT_GEN_A_CORE
Pt for discharge today back to assisted living, s/p HD yesterday. Pt is scheduled for dialysis on Friday and Saturday at outside HD centre as per Nephro, spoke to pt regarding HD schedule pt agreeable.    Above d/w Dr. Mosher and Dr. JACQUELYN De La Torre
Pt counseled on the importance of EKG and blood draw for BMP due to high potassium levels. Patient refusing both. Multiple attempts at explanation made. Nurse also attempted, but patient refused.
s/p HD yesterday.  no objection to D/C today.  Bing daily.  Will arrange for HD in AM at [facility]

## 2022-01-20 NOTE — DISCHARGE NOTE NURSING/CASE MANAGEMENT/SOCIAL WORK - PATIENT PORTAL LINK FT
You can access the FollowMyHealth Patient Portal offered by [facility] by registering at the following website: [email or web address]. By joining [employer]’s FollowMyHealth portal, you will also be able to view your health information using other applications (apps) compatible with our system.

## 2022-01-20 NOTE — DISCHARGE NOTE NURSING/CASE MANAGEMENT/SOCIAL WORK - NSDCFUADDAPPT_GEN_ALL_CORE_FT
[facility] [place]  [address] [place] [address]  [phone]  next treatment: Friday, 1/21/22

## 2022-01-24 ENCOUNTER — APPOINTMENT (OUTPATIENT)
Dept: VASCULAR SURGERY | Facility: CLINIC | Age: 62
End: 2022-01-24
Payer: MEDICAID

## 2022-01-24 VITALS — HEART RATE: 91 BPM | SYSTOLIC BLOOD PRESSURE: 168 MMHG | DIASTOLIC BLOOD PRESSURE: 92 MMHG

## 2022-01-24 PROCEDURE — 99213 OFFICE O/P EST LOW 20 MIN: CPT

## 2022-01-24 PROCEDURE — 93990 DOPPLER FLOW TESTING: CPT

## 2022-01-24 NOTE — HISTORY OF PRESENT ILLNESS
[FreeTextEntry1] : 61 yo male with history of esrd on hd presents for follow up \par pt reports tenderness along the avf\par pt states that he was recently in the hospital for hematemesis now resolved.  \par  [] : right brachiocephalic fistula

## 2022-01-24 NOTE — DISCUSSION/SUMMARY
[FreeTextEntry1] : 62 yo male with history of esrd on hd via right upper extremity brachial cephalic avf presents for follow up reporting tenderness and swelling of the avf since recent hospitalization \par \par duplex shows large non-occusive thrombus of right upper extremity with flow around the thrombus \par \par given large thrombus with pulsatile avf and likely phlebitis will arrange for fistulagram tomorrow

## 2022-01-24 NOTE — PHYSICAL EXAM
[Normal] : no acute distress, well-nourished, well developed, well appearing [Thrill] : thrill [Pulsatile Thrill] : pulsatile thrill [Aneurysm] : aneurysm [Bleeding] : no bleeding [Hand well perfused] : hand well perfused [Ulcer] : no ulcer [de-identified] : intact

## 2022-01-25 ENCOUNTER — APPOINTMENT (OUTPATIENT)
Dept: ENDOVASCULAR SURGERY | Facility: CLINIC | Age: 62
End: 2022-01-25
Payer: MEDICAID

## 2022-01-25 ENCOUNTER — NON-APPOINTMENT (OUTPATIENT)
Age: 62
End: 2022-01-25

## 2022-01-25 VITALS
OXYGEN SATURATION: 95 % | TEMPERATURE: 98.1 F | HEART RATE: 90 BPM | RESPIRATION RATE: 18 BRPM | DIASTOLIC BLOOD PRESSURE: 87 MMHG | HEIGHT: 63 IN | BODY MASS INDEX: 32.25 KG/M2 | WEIGHT: 182 LBS | SYSTOLIC BLOOD PRESSURE: 164 MMHG

## 2022-01-25 PROCEDURE — 36907Z: CUSTOM | Mod: 59

## 2022-01-25 PROCEDURE — 36902Z: CUSTOM

## 2022-01-25 RX ORDER — SODIUM ZIRCONIUM CYCLOSILICATE 10 G/10G
10 POWDER, FOR SUSPENSION ORAL
Refills: 0 | Status: ACTIVE | COMMUNITY

## 2022-01-25 RX ORDER — AMLODIPINE BESYLATE 10 MG/1
10 TABLET ORAL
Refills: 0 | Status: ACTIVE | COMMUNITY

## 2022-01-25 RX ORDER — ZOLPIDEM TARTRATE 5 MG/1
5 TABLET, FILM COATED ORAL
Refills: 0 | Status: ACTIVE | COMMUNITY

## 2022-01-25 RX ORDER — FOLIC ACID/VIT B COMPLEX AND C 0.8 MG
TABLET ORAL
Refills: 0 | Status: ACTIVE | COMMUNITY

## 2022-01-25 RX ORDER — PANTOPRAZOLE SODIUM 40 MG/1
40 TABLET, DELAYED RELEASE ORAL
Refills: 0 | Status: ACTIVE | COMMUNITY

## 2022-01-25 RX ORDER — IPRATROPIUM/ALBUTEROL SULFATE 0.5-3MG/3
0.5-2.5 (3) AMPUL FOR NEBULIZATION (ML) INHALATION
Refills: 0 | Status: ACTIVE | COMMUNITY

## 2022-01-25 RX ORDER — METOCLOPRAMIDE HYDROCHLORIDE 5 MG/1
5 TABLET ORAL
Refills: 0 | Status: ACTIVE | COMMUNITY

## 2022-01-25 NOTE — PAST MEDICAL HISTORY
[Altered mobility] : Altered mobility [Increasing age ( >40 years old)] : Increasing age ( >40 years old) [No therapy indicated for cases scheduled for less than one hour] : No therapy indicated for cases scheduled for less than one hour. [FreeTextEntry1] : Malignant Hyperthermia Screening Tool and Risk of Bleeding Assessment \par \par Mr. KIAN VALERO denies family of unexpected death following Anesthesia or Exercise.\par Denies Family history of Malignant Hyperthermia, Muscle or Neuromuscular disorder and High Temperature  following exercise.\par \par Mr. KIAN VALERO denies history of Muscle Spasm, Dark or Chocolate- Colored and Unanticipated fever immediately following anaesthesia or serious exercise.\par Mr. KIAN VALERO also denies bleeding tendencies/Risks of Bleeding.\par

## 2022-01-25 NOTE — PROCEDURE
[Resume diet] : resume diet [Site check for bleeding/hematoma/thrill/bruit] : Site check for bleeding/hematoma/thrill/bruit [Vital signs on admission the q 15 mins x2] : Vital signs on admission the q 15 mins x2 [FreeTextEntry1] : right arm fistula/fistulogram/angioplasty

## 2022-01-25 NOTE — HISTORY OF PRESENT ILLNESS
[] : right radiocephalic fistula [FreeTextEntry1] : creation 11-18-18 Dr Maosn [FreeTextEntry5] : yesterday at 7pm  [FreeTextEntry6] : Dr Faith

## 2022-01-25 NOTE — REASON FOR VISIT
[Other ___] : a [unfilled] visit for [Inadequate Flow within AVF] : inadequate flow within AVF [Formal Caregiver] : formal caregiver [FreeTextEntry2] : stenosis on duplex

## 2022-02-09 ENCOUNTER — APPOINTMENT (OUTPATIENT)
Dept: DERMATOLOGY | Facility: CLINIC | Age: 62
End: 2022-02-09
Payer: MEDICAID

## 2022-02-09 PROCEDURE — 99203 OFFICE O/P NEW LOW 30 MIN: CPT

## 2022-02-22 ENCOUNTER — INPATIENT (INPATIENT)
Facility: HOSPITAL | Age: 62
LOS: 1 days | Discharge: NURSING, REHAB OR LONG-TERM CARE | DRG: 189 | End: 2022-02-24
Attending: INTERNAL MEDICINE | Admitting: INTERNAL MEDICINE
Payer: MEDICAID

## 2022-02-22 VITALS
HEART RATE: 103 BPM | SYSTOLIC BLOOD PRESSURE: 189 MMHG | TEMPERATURE: 97 F | DIASTOLIC BLOOD PRESSURE: 98 MMHG | HEIGHT: 66 IN | OXYGEN SATURATION: 100 % | RESPIRATION RATE: 18 BRPM

## 2022-02-22 DIAGNOSIS — Z98.42 CATARACT EXTRACTION STATUS, LEFT EYE: Chronic | ICD-10-CM

## 2022-02-22 DIAGNOSIS — Z89.512 ACQUIRED ABSENCE OF LEFT LEG BELOW KNEE: Chronic | ICD-10-CM

## 2022-02-22 DIAGNOSIS — Z98.41 CATARACT EXTRACTION STATUS, RIGHT EYE: Chronic | ICD-10-CM

## 2022-02-22 DIAGNOSIS — I25.10 ATHEROSCLEROTIC HEART DISEASE OF NATIVE CORONARY ARTERY WITHOUT ANGINA PECTORIS: ICD-10-CM

## 2022-02-22 DIAGNOSIS — I10 ESSENTIAL (PRIMARY) HYPERTENSION: ICD-10-CM

## 2022-02-22 DIAGNOSIS — J96.91 RESPIRATORY FAILURE, UNSPECIFIED WITH HYPOXIA: ICD-10-CM

## 2022-02-22 DIAGNOSIS — Z29.9 ENCOUNTER FOR PROPHYLACTIC MEASURES, UNSPECIFIED: ICD-10-CM

## 2022-02-22 DIAGNOSIS — D63.8 ANEMIA IN OTHER CHRONIC DISEASES CLASSIFIED ELSEWHERE: ICD-10-CM

## 2022-02-22 DIAGNOSIS — Z98.890 OTHER SPECIFIED POSTPROCEDURAL STATES: Chronic | ICD-10-CM

## 2022-02-22 DIAGNOSIS — D64.9 ANEMIA, UNSPECIFIED: ICD-10-CM

## 2022-02-22 DIAGNOSIS — N18.6 END STAGE RENAL DISEASE: ICD-10-CM

## 2022-02-22 DIAGNOSIS — J81.0 ACUTE PULMONARY EDEMA: ICD-10-CM

## 2022-02-22 DIAGNOSIS — E78.5 HYPERLIPIDEMIA, UNSPECIFIED: ICD-10-CM

## 2022-02-22 DIAGNOSIS — Z87.448 PERSONAL HISTORY OF OTHER DISEASES OF URINARY SYSTEM: Chronic | ICD-10-CM

## 2022-02-22 DIAGNOSIS — J90 PLEURAL EFFUSION, NOT ELSEWHERE CLASSIFIED: ICD-10-CM

## 2022-02-22 LAB
ALBUMIN SERPL ELPH-MCNC: 3.1 G/DL — LOW (ref 3.5–5)
ALP SERPL-CCNC: 95 U/L — SIGNIFICANT CHANGE UP (ref 40–120)
ALT FLD-CCNC: 13 U/L DA — SIGNIFICANT CHANGE UP (ref 10–60)
ANION GAP SERPL CALC-SCNC: 7 MMOL/L — SIGNIFICANT CHANGE UP (ref 5–17)
ANION GAP SERPL CALC-SCNC: 8 MMOL/L — SIGNIFICANT CHANGE UP (ref 5–17)
APTT BLD: 33.1 SEC — SIGNIFICANT CHANGE UP (ref 27.5–35.5)
AST SERPL-CCNC: 15 U/L — SIGNIFICANT CHANGE UP (ref 10–40)
BASE EXCESS BLDV CALC-SCNC: 0.7 MMOL/L — SIGNIFICANT CHANGE UP
BASOPHILS # BLD AUTO: 0.02 K/UL — SIGNIFICANT CHANGE UP (ref 0–0.2)
BASOPHILS NFR BLD AUTO: 0.4 % — SIGNIFICANT CHANGE UP (ref 0–2)
BILIRUB SERPL-MCNC: 0.5 MG/DL — SIGNIFICANT CHANGE UP (ref 0.2–1.2)
BUN SERPL-MCNC: 32 MG/DL — HIGH (ref 7–18)
BUN SERPL-MCNC: 38 MG/DL — HIGH (ref 7–18)
CALCIUM SERPL-MCNC: 7.8 MG/DL — LOW (ref 8.4–10.5)
CALCIUM SERPL-MCNC: 8.3 MG/DL — LOW (ref 8.4–10.5)
CHLORIDE SERPL-SCNC: 104 MMOL/L — SIGNIFICANT CHANGE UP (ref 96–108)
CHLORIDE SERPL-SCNC: 105 MMOL/L — SIGNIFICANT CHANGE UP (ref 96–108)
CK MB BLD-MCNC: 5.4 % — HIGH (ref 0–3.5)
CK MB CFR SERPL CALC: 5.7 NG/ML — HIGH (ref 0–3.6)
CK SERPL-CCNC: 105 U/L — SIGNIFICANT CHANGE UP (ref 35–232)
CO2 SERPL-SCNC: 25 MMOL/L — SIGNIFICANT CHANGE UP (ref 22–31)
CO2 SERPL-SCNC: 27 MMOL/L — SIGNIFICANT CHANGE UP (ref 22–31)
CREAT SERPL-MCNC: 10.2 MG/DL — HIGH (ref 0.5–1.3)
CREAT SERPL-MCNC: 9.57 MG/DL — HIGH (ref 0.5–1.3)
EOSINOPHIL # BLD AUTO: 0.14 K/UL — SIGNIFICANT CHANGE UP (ref 0–0.5)
EOSINOPHIL NFR BLD AUTO: 2.6 % — SIGNIFICANT CHANGE UP (ref 0–6)
GLUCOSE BLDC GLUCOMTR-MCNC: 141 MG/DL — HIGH (ref 70–99)
GLUCOSE BLDC GLUCOMTR-MCNC: 218 MG/DL — HIGH (ref 70–99)
GLUCOSE BLDC GLUCOMTR-MCNC: 56 MG/DL — LOW (ref 70–99)
GLUCOSE BLDC GLUCOMTR-MCNC: 82 MG/DL — SIGNIFICANT CHANGE UP (ref 70–99)
GLUCOSE SERPL-MCNC: 125 MG/DL — HIGH (ref 70–99)
GLUCOSE SERPL-MCNC: 62 MG/DL — LOW (ref 70–99)
HBV SURFACE AG SER-ACNC: SIGNIFICANT CHANGE UP
HCO3 BLDV-SCNC: 29 MMOL/L — SIGNIFICANT CHANGE UP (ref 22–29)
HCT VFR BLD CALC: 25.5 % — LOW (ref 39–50)
HCT VFR BLD CALC: 31.6 % — LOW (ref 39–50)
HGB BLD-MCNC: 8 G/DL — LOW (ref 13–17)
HGB BLD-MCNC: 9.8 G/DL — LOW (ref 13–17)
HOROWITZ INDEX BLDV+IHG-RTO: 21 — SIGNIFICANT CHANGE UP
IMM GRANULOCYTES NFR BLD AUTO: 0.2 % — SIGNIFICANT CHANGE UP (ref 0–1.5)
INR BLD: 0.92 RATIO — SIGNIFICANT CHANGE UP (ref 0.88–1.16)
LYMPHOCYTES # BLD AUTO: 0.41 K/UL — LOW (ref 1–3.3)
LYMPHOCYTES # BLD AUTO: 7.5 % — LOW (ref 13–44)
MANUAL SMEAR VERIFICATION: SIGNIFICANT CHANGE UP
MCHC RBC-ENTMCNC: 29.1 PG — SIGNIFICANT CHANGE UP (ref 27–34)
MCHC RBC-ENTMCNC: 29.1 PG — SIGNIFICANT CHANGE UP (ref 27–34)
MCHC RBC-ENTMCNC: 31 GM/DL — LOW (ref 32–36)
MCHC RBC-ENTMCNC: 31.4 GM/DL — LOW (ref 32–36)
MCV RBC AUTO: 92.7 FL — SIGNIFICANT CHANGE UP (ref 80–100)
MCV RBC AUTO: 93.8 FL — SIGNIFICANT CHANGE UP (ref 80–100)
MONOCYTES # BLD AUTO: 0.26 K/UL — SIGNIFICANT CHANGE UP (ref 0–0.9)
MONOCYTES NFR BLD AUTO: 4.8 % — SIGNIFICANT CHANGE UP (ref 2–14)
NEUTROPHILS # BLD AUTO: 4.61 K/UL — SIGNIFICANT CHANGE UP (ref 1.8–7.4)
NEUTROPHILS NFR BLD AUTO: 84.5 % — HIGH (ref 43–77)
NRBC # BLD: 0 /100 WBCS — SIGNIFICANT CHANGE UP (ref 0–0)
NRBC # BLD: 0 /100 WBCS — SIGNIFICANT CHANGE UP (ref 0–0)
NT-PROBNP SERPL-SCNC: HIGH PG/ML (ref 0–125)
PCO2 BLDV: 62 MMHG — HIGH (ref 42–55)
PH BLDV: 7.27 — LOW (ref 7.32–7.43)
PLAT MORPH BLD: NORMAL — SIGNIFICANT CHANGE UP
PLATELET # BLD AUTO: 109 K/UL — LOW (ref 150–400)
PLATELET # BLD AUTO: 95 K/UL — LOW (ref 150–400)
PLATELET COUNT - ESTIMATE: ABNORMAL
PO2 BLDV: 26 MMHG — SIGNIFICANT CHANGE UP
POTASSIUM SERPL-MCNC: 4.7 MMOL/L — SIGNIFICANT CHANGE UP (ref 3.5–5.3)
POTASSIUM SERPL-MCNC: 4.9 MMOL/L — SIGNIFICANT CHANGE UP (ref 3.5–5.3)
POTASSIUM SERPL-SCNC: 4.7 MMOL/L — SIGNIFICANT CHANGE UP (ref 3.5–5.3)
POTASSIUM SERPL-SCNC: 4.9 MMOL/L — SIGNIFICANT CHANGE UP (ref 3.5–5.3)
PROT SERPL-MCNC: 7.1 G/DL — SIGNIFICANT CHANGE UP (ref 6–8.3)
PROTHROM AB SERPL-ACNC: 11 SEC — SIGNIFICANT CHANGE UP (ref 10.6–13.6)
RBC # BLD: 2.75 M/UL — LOW (ref 4.2–5.8)
RBC # BLD: 3.37 M/UL — LOW (ref 4.2–5.8)
RBC # FLD: 18.5 % — HIGH (ref 10.3–14.5)
RBC # FLD: 18.6 % — HIGH (ref 10.3–14.5)
RBC BLD AUTO: ABNORMAL
SAO2 % BLDV: 37 % — SIGNIFICANT CHANGE UP
SARS-COV-2 RNA SPEC QL NAA+PROBE: SIGNIFICANT CHANGE UP
SODIUM SERPL-SCNC: 138 MMOL/L — SIGNIFICANT CHANGE UP (ref 135–145)
SODIUM SERPL-SCNC: 138 MMOL/L — SIGNIFICANT CHANGE UP (ref 135–145)
TROPONIN I, HIGH SENSITIVITY RESULT: 108 NG/L — HIGH
TROPONIN I, HIGH SENSITIVITY RESULT: 86.1 NG/L — HIGH
WBC # BLD: 3.18 K/UL — LOW (ref 3.8–10.5)
WBC # BLD: 5.45 K/UL — SIGNIFICANT CHANGE UP (ref 3.8–10.5)
WBC # FLD AUTO: 3.18 K/UL — LOW (ref 3.8–10.5)
WBC # FLD AUTO: 5.45 K/UL — SIGNIFICANT CHANGE UP (ref 3.8–10.5)

## 2022-02-22 PROCEDURE — 71045 X-RAY EXAM CHEST 1 VIEW: CPT | Mod: 26

## 2022-02-22 PROCEDURE — 99285 EMERGENCY DEPT VISIT HI MDM: CPT

## 2022-02-22 RX ORDER — ONDANSETRON 8 MG/1
4 TABLET, FILM COATED ORAL EVERY 8 HOURS
Refills: 0 | Status: DISCONTINUED | OUTPATIENT
Start: 2022-02-22 | End: 2022-02-24

## 2022-02-22 RX ORDER — HEPARIN SODIUM 5000 [USP'U]/ML
5000 INJECTION INTRAVENOUS; SUBCUTANEOUS EVERY 12 HOURS
Refills: 0 | Status: DISCONTINUED | OUTPATIENT
Start: 2022-02-22 | End: 2022-02-24

## 2022-02-22 RX ORDER — ALBUTEROL 90 UG/1
2 AEROSOL, METERED ORAL EVERY 6 HOURS
Refills: 0 | Status: DISCONTINUED | OUTPATIENT
Start: 2022-02-22 | End: 2022-02-24

## 2022-02-22 RX ORDER — ZOLPIDEM TARTRATE 10 MG/1
5 TABLET ORAL AT BEDTIME
Refills: 0 | Status: DISCONTINUED | OUTPATIENT
Start: 2022-02-22 | End: 2022-02-24

## 2022-02-22 RX ORDER — METOPROLOL TARTRATE 50 MG
25 TABLET ORAL ONCE
Refills: 0 | Status: COMPLETED | OUTPATIENT
Start: 2022-02-22 | End: 2022-02-22

## 2022-02-22 RX ORDER — NORTRIPTYLINE HYDROCHLORIDE 10 MG/1
10 CAPSULE ORAL AT BEDTIME
Refills: 0 | Status: DISCONTINUED | OUTPATIENT
Start: 2022-02-22 | End: 2022-02-24

## 2022-02-22 RX ORDER — PANTOPRAZOLE SODIUM 20 MG/1
40 TABLET, DELAYED RELEASE ORAL
Refills: 0 | Status: DISCONTINUED | OUTPATIENT
Start: 2022-02-22 | End: 2022-02-24

## 2022-02-22 RX ORDER — FOLIC ACID 0.8 MG
1 TABLET ORAL DAILY
Refills: 0 | Status: DISCONTINUED | OUTPATIENT
Start: 2022-02-22 | End: 2022-02-24

## 2022-02-22 RX ORDER — ACETAMINOPHEN 500 MG
650 TABLET ORAL EVERY 6 HOURS
Refills: 0 | Status: DISCONTINUED | OUTPATIENT
Start: 2022-02-22 | End: 2022-02-24

## 2022-02-22 RX ORDER — FERROUS SULFATE 325(65) MG
325 TABLET ORAL DAILY
Refills: 0 | Status: DISCONTINUED | OUTPATIENT
Start: 2022-02-22 | End: 2022-02-24

## 2022-02-22 RX ORDER — GLUCAGON INJECTION, SOLUTION 0.5 MG/.1ML
1 INJECTION, SOLUTION SUBCUTANEOUS ONCE
Refills: 0 | Status: DISCONTINUED | OUTPATIENT
Start: 2022-02-22 | End: 2022-02-24

## 2022-02-22 RX ORDER — SODIUM ZIRCONIUM CYCLOSILICATE 10 G/10G
10 POWDER, FOR SUSPENSION ORAL DAILY
Refills: 0 | Status: DISCONTINUED | OUTPATIENT
Start: 2022-02-22 | End: 2022-02-24

## 2022-02-22 RX ORDER — INSULIN LISPRO 100/ML
VIAL (ML) SUBCUTANEOUS
Refills: 0 | Status: DISCONTINUED | OUTPATIENT
Start: 2022-02-22 | End: 2022-02-23

## 2022-02-22 RX ORDER — AMLODIPINE BESYLATE 2.5 MG/1
10 TABLET ORAL DAILY
Refills: 0 | Status: DISCONTINUED | OUTPATIENT
Start: 2022-02-22 | End: 2022-02-24

## 2022-02-22 RX ORDER — CHOLECALCIFEROL (VITAMIN D3) 125 MCG
1000 CAPSULE ORAL DAILY
Refills: 0 | Status: DISCONTINUED | OUTPATIENT
Start: 2022-02-22 | End: 2022-02-24

## 2022-02-22 RX ORDER — SUCRALFATE 1 G
1 TABLET ORAL
Refills: 0 | Status: DISCONTINUED | OUTPATIENT
Start: 2022-02-22 | End: 2022-02-24

## 2022-02-22 RX ORDER — ERYTHROPOIETIN 10000 [IU]/ML
4000 INJECTION, SOLUTION INTRAVENOUS; SUBCUTANEOUS
Refills: 0 | Status: DISCONTINUED | OUTPATIENT
Start: 2022-02-22 | End: 2022-02-24

## 2022-02-22 RX ORDER — ALBUTEROL 90 UG/1
2 AEROSOL, METERED ORAL EVERY 6 HOURS
Refills: 0 | Status: COMPLETED | OUTPATIENT
Start: 2022-02-22 | End: 2023-01-01

## 2022-02-22 RX ORDER — METOCLOPRAMIDE HCL 10 MG
5 TABLET ORAL THREE TIMES A DAY
Refills: 0 | Status: DISCONTINUED | OUTPATIENT
Start: 2022-02-22 | End: 2022-02-24

## 2022-02-22 RX ORDER — IPRATROPIUM/ALBUTEROL SULFATE 18-103MCG
3 AEROSOL WITH ADAPTER (GRAM) INHALATION EVERY 6 HOURS
Refills: 0 | Status: DISCONTINUED | OUTPATIENT
Start: 2022-02-22 | End: 2022-02-22

## 2022-02-22 RX ORDER — LANOLIN ALCOHOL/MO/W.PET/CERES
3 CREAM (GRAM) TOPICAL AT BEDTIME
Refills: 0 | Status: DISCONTINUED | OUTPATIENT
Start: 2022-02-22 | End: 2022-02-24

## 2022-02-22 RX ORDER — HYDRALAZINE HCL 50 MG
25 TABLET ORAL THREE TIMES A DAY
Refills: 0 | Status: DISCONTINUED | OUTPATIENT
Start: 2022-02-22 | End: 2022-02-24

## 2022-02-22 RX ORDER — SEVELAMER CARBONATE 2400 MG/1
2400 POWDER, FOR SUSPENSION ORAL
Refills: 0 | Status: DISCONTINUED | OUTPATIENT
Start: 2022-02-22 | End: 2022-02-24

## 2022-02-22 RX ORDER — INSULIN GLARGINE 100 [IU]/ML
6 INJECTION, SOLUTION SUBCUTANEOUS AT BEDTIME
Refills: 0 | Status: DISCONTINUED | OUTPATIENT
Start: 2022-02-22 | End: 2022-02-23

## 2022-02-22 RX ADMIN — Medication 5 MILLIGRAM(S): at 17:44

## 2022-02-22 RX ADMIN — ALBUTEROL 2 PUFF(S): 90 AEROSOL, METERED ORAL at 22:58

## 2022-02-22 RX ADMIN — INSULIN GLARGINE 6 UNIT(S): 100 INJECTION, SOLUTION SUBCUTANEOUS at 23:00

## 2022-02-22 RX ADMIN — SEVELAMER CARBONATE 2400 MILLIGRAM(S): 2400 POWDER, FOR SUSPENSION ORAL at 17:46

## 2022-02-22 RX ADMIN — Medication 0: at 23:01

## 2022-02-22 RX ADMIN — SEVELAMER CARBONATE 2400 MILLIGRAM(S): 2400 POWDER, FOR SUSPENSION ORAL at 15:20

## 2022-02-22 RX ADMIN — SODIUM ZIRCONIUM CYCLOSILICATE 10 GRAM(S): 10 POWDER, FOR SUSPENSION ORAL at 17:45

## 2022-02-22 RX ADMIN — AMLODIPINE BESYLATE 10 MILLIGRAM(S): 2.5 TABLET ORAL at 22:59

## 2022-02-22 RX ADMIN — ERYTHROPOIETIN 4000 UNIT(S): 10000 INJECTION, SOLUTION INTRAVENOUS; SUBCUTANEOUS at 13:55

## 2022-02-22 RX ADMIN — ZOLPIDEM TARTRATE 5 MILLIGRAM(S): 10 TABLET ORAL at 22:59

## 2022-02-22 RX ADMIN — Medication 1000 UNIT(S): at 15:19

## 2022-02-22 RX ADMIN — ALBUTEROL 2 PUFF(S): 90 AEROSOL, METERED ORAL at 18:27

## 2022-02-22 RX ADMIN — Medication 325 MILLIGRAM(S): at 15:20

## 2022-02-22 RX ADMIN — Medication 1 GRAM(S): at 15:22

## 2022-02-22 RX ADMIN — Medication 1 MILLIGRAM(S): at 15:20

## 2022-02-22 RX ADMIN — Medication 2: at 18:02

## 2022-02-22 RX ADMIN — Medication 1 TABLET(S): at 15:20

## 2022-02-22 RX ADMIN — Medication 1 GRAM(S): at 17:47

## 2022-02-22 RX ADMIN — Medication 5 MILLIGRAM(S): at 22:59

## 2022-02-22 RX ADMIN — Medication 25 MILLIGRAM(S): at 23:00

## 2022-02-22 NOTE — PATIENT PROFILE ADULT - FALL HARM RISK - HARM RISK INTERVENTIONS
Assistance with ambulation/Assistance OOB with selected safe patient handling equipment/Communicate Risk of Fall with Harm to all staff/Discuss with provider need for PT consult/Monitor gait and stability/Reinforce activity limits and safety measures with patient and family/Review medications for side effects contributing to fall risk/Sit up slowly, dangle for a short time, stand at bedside before walking/Tailored Fall Risk Interventions/Toileting schedule using arm’s reach rule for commode and bathroom/Visual Cue: Yellow wristband and red socks/Bed in lowest position, wheels locked, appropriate side rails in place/Call bell, personal items and telephone in reach/Instruct patient to call for assistance before getting out of bed or chair/Non-slip footwear when patient is out of bed/[place] to call system/Physically safe environment - no spills, clutter or unnecessary equipment/Purposeful Proactive Rounding/Room/bathroom lighting operational, light cord in reach

## 2022-02-22 NOTE — H&P ADULT - HISTORY OF PRESENT ILLNESS
Patient is a 62 y/o male from [facility] with a significant medical history of HTN, Anemia, CAD, HLD, T2DM, GERD, Adrenal Insuffiencey, Left BKA, prior GI Bleed, and ESRD on HD (TTS) was brought to the ED for complaints of shortness of breath x 1 day. Pt is AA0x3, but is not very cooperative with the interview and speaks with his eyes closed, giving very little information. As per transfer paperwork, he was found to have worsening acute onset shortness of breath requiring oxygen which was the reason for transfer. Patient denies using 02 at baseline but does use nebulization TID. He otherwise denies missing any medications, recent increase in salt/water intake, headaches, visual disturbances, N/V/D, dizziness, falls, cough, wheezing, chest pain, palpitations, or fevers. His last HD was on Saturday and he completed the full course without any issues. At present his leg swelling is at baseline, and he is comfortable on Venti mask while lying down.

## 2022-02-22 NOTE — ED ADULT NURSE REASSESSMENT NOTE - NS ED NURSE REASSESS COMMENT FT1
Patient refused to put Telemetry box monitor on. Dr. Adams attempted to put Telemetry box on the patient, patient refused.

## 2022-02-22 NOTE — ED ADULT NURSE NOTE - NSICDXFAMILYHX_GEN_ALL_CORE_FT
5
FAMILY HISTORY:  Mother  Still living? No  Family history of cirrhosis of liver, Age at diagnosis: Age Unknown    Sibling  Still living? Yes, Estimated age: Age Unknown  Family history of diabetes mellitus, Age at diagnosis: Age Unknown  Family history of hypertension, Age at diagnosis: Age Unknown  Family history of renal failure, Age at diagnosis: Age Unknown  History of substance abuse in sibling, Age at diagnosis: Age Unknown

## 2022-02-22 NOTE — H&P ADULT - PROBLEM SELECTOR PLAN 3
Lytes are wnl, no urgent need for HD   Treatment as above   C/w home medications including phospate binders   Nephro Dr. Mosher

## 2022-02-22 NOTE — H&P ADULT - PROBLEM SELECTOR PLAN 1
Pt with acute onset of respiratory failure requiring supplemental 02   now on venturi mask 40L, will down titrate as able   ABG 7.27/62/26/29 showing respiratory acidosis not fully compensated (Baseline C02 is 40-50)  May benefit from Bipap, but currently comfortable (pt has previously been on bipap on prior adm)  Likely etiologies hypertensive emergency vs new CHF vs fluid overload from ESRD  Trop 86.1, BNP 81K   F/u Echo  Will proceed with HD/ UF as per schedule to remove excess fluid  Strict I/O's/daily wieghts as pt still makes urine  Nephrology Dr. Mosher  Pulmonary Dr. Loredo   Cardiology Dr. Still

## 2022-02-22 NOTE — ED PROVIDER NOTE - OBJECTIVE STATEMENT
60 y/o male with a past medical history of insatiable disease on hemodialysis Tuesday, Saturday, Thursday, CAD, hyperlipidemia, diabetes and hypertension presents with a sudden onset of shortness of breath that started a few hours ago. Patient denies any fever, cough, leg swelling and chest pain. Per EMS, patient's O2 saturation was at 85% on room air on a nonrebreather mask. Patient allergic to fish and liver.

## 2022-02-22 NOTE — ED PROVIDER NOTE - CLINICAL SUMMARY MEDICAL DECISION MAKING FREE TEXT BOX
60 y/o male with a past medical history of insatiable disease on hemodialysis Tuesday, Saturday, Thursday, CAD, hyperlipidemia, diabetes and hypertension presents with a sudden onset of shortness of breath that started a few hours ago. Will obtain EKG, labs and chest x-ray. In ED, O2 saturation was at 99% on 4mL cannula. Patient does not like the cannula and states he likes the mask. Patient was placed on vent mask at 40%. 60 y/o male with a past medical history of insatiable disease on hemodialysis Tuesday, Saturday, Thursday, CAD, hyperlipidemia, diabetes and hypertension presents with a sudden onset of shortness of breath that started a few hours ago. Will obtain EKG, labs and chest x-ray. In ED, O2 saturation was at 99% on 4mL cannula. Patient does not like the cannula and states he likes the mask. Patient was placed on venturi mask at 40% FiO2.    labs show K wnl, Cr 9, wbc 11K, trop 86-decreased from 2 months ago, proBNP >70K, CXR shows mild pulmonary edema and left pleural effusion  Discussed above with patient who agrees with plan for admission for inpatient HD and further management.

## 2022-02-22 NOTE — CONSULT NOTE ADULT - SUBJECTIVE AND OBJECTIVE BOX
C A R D I O L O G Y  *********************    DATE OF SERVICE: 02-22-22    HISTORY OF PRESENT ILLNESS: HPI:  Patient is a 60 y/o male from [facility] with a significant medical history of HTN, Anemia, CAD, HLD, T2DM, GERD, Adrenal Insuffiencey, Left BKA, prior GI Bleed, and ESRD on HD (TTS) was brought to the ED for complaints of shortness of breath x 1 day. Pt is AA0x3, but is not very cooperative with the interview and speaks with his eyes closed, giving very little information. As per transfer paperwork, he was found to have worsening acute onset shortness of breath requiring oxygen which was the reason for transfer. Patient denies using 02 at baseline but does use nebulization TID. He otherwise denies missing any medications, recent increase in salt/water intake, headaches, visual disturbances, N/V/D, dizziness, falls, cough, wheezing, chest pain, palpitations, or fevers. His last HD was on Saturday and he completed the full course without any issues. At present his leg swelling is at baseline, and he is comfortable on Venti mask while lying down.    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    Osteoarthritis    Vision loss of left eye    ESRD on hemodialysis    Cataract  both eyes - hx of sx done    BPH (benign prostatic hyperplasia)    UTI (urinary tract infection)  hx of    Bladder mass  hx of    H/O hematuria    Osteoporosis    Vision loss of right eye    Depression    Chronic GERD    Osteomyelitis of vertebra    Below knee amputation status, left  2012- pt is wearing prostesis    History of right cataract extraction    History of left cataract extraction    S/P arteriovenous (AV) fistula creation  right arm brachiocephalic arteriovenous fistula on 11/08/2018    H/O hematuria  s/p bladder bx and fulguration 2/25/2020    H/O transurethral destruction of bladder lesion  2020    History of excision of mass  back mass on 03/31/2021            MEDICATIONS:  MEDICATIONS  (STANDING):  ALBUTerol    90 MICROgram(s) HFA Inhaler 2 Puff(s) Inhalation every 6 hours  amLODIPine   Tablet 10 milliGRAM(s) Oral daily  cholecalciferol 1000 Unit(s) Oral daily  epoetin beverley-epbx (RETACRIT) Injectable 4000 Unit(s) IV Push <User Schedule>  ferrous    sulfate 325 milliGRAM(s) Oral daily  folic acid 1 milliGRAM(s) Oral daily  glucagon  Injectable 1 milliGRAM(s) IntraMuscular once  heparin   Injectable 5000 Unit(s) SubCutaneous every 12 hours  insulin glargine Injectable (LANTUS) 6 Unit(s) SubCutaneous at bedtime  insulin lispro (ADMELOG) corrective regimen sliding scale   SubCutaneous Before meals and at bedtime  metoclopramide 5 milliGRAM(s) Oral three times a day  multivitamin 1 Tablet(s) Oral daily  nortriptyline 10 milliGRAM(s) Oral at bedtime  pantoprazole    Tablet 40 milliGRAM(s) Oral before breakfast  sevelamer carbonate 2400 milliGRAM(s) Oral three times a day with meals  sodium zirconium cyclosilicate 10 Gram(s) Oral daily  sucralfate suspension 1 Gram(s) Oral four times a day      Allergies    fish (Rash)  liver (Anaphylaxis)  No Known Drug Allergies    Intolerances        FAMILY HISTORY:  Family history of cirrhosis of liver (Mother)    Family history of renal failure (Sibling)    Family history of hypertension (Sibling)    Family history of diabetes mellitus (Sibling)    History of substance abuse in sibling (Sibling)      Non-contributary for premature coronary disease or sudden cardiac death    SOCIAL HISTORY:    [ ] Non-smoker  [ ] Smoker  [ ] Alcohol    FLU VACCINE THIS YEAR STARTS IN AUGUST:  [ ] Yes    [ ] No    IF OVER 65 HAVE YOU EVER HAD A PNA VACCINE:  [ ] Yes    [ ] No       [ ] N/A      REVIEW OF SYSTEMS:  [ ]chest pain  [  ]shortness of breath  [  ]palpitations  [  ]syncope  [ ]near syncope [ ]upper extremity weakness   [ ] lower extremity weakness  [  ]diplopia  [  ]altered mental status   [  ]fevers  [ ]chills [ ]nausea  [ ]vomitting  [  ]dysphagia    [ ]abdominal pain  [ ]melena  [ ]BRBPR    [  ]epistaxis  [  ]rash    [ ]lower extremity edema        [X] All others negative	  [ ] Unable to obtain      LABS:	 	    CARDIAC MARKERS:        Troponin I, High Sensitivity Result: 86.1 ng/L (02-22-22 @ 00:58)                            9.8    5.45  )-----------( 109      ( 22 Feb 2022 00:58 )             31.6     Hb Trend: 9.8<--    02-22    138  |  104  |  32<H>  ----------------------------<  62<L>  4.9   |  27  |  9.57<H>    Ca    8.3<L>      22 Feb 2022 00:58    TPro  7.1  /  Alb  3.1<L>  /  TBili  0.5  /  DBili  x   /  AST  15  /  ALT  13  /  AlkPhos  95  02-22    Creatinine Trend: 9.57<--    Coags:  PT/INR - ( 22 Feb 2022 00:58 )   PT: 11.0 sec;   INR: 0.92 ratio         PTT - ( 22 Feb 2022 00:58 )  PTT:33.1 sec    proBNP: Serum Pro-Brain Natriuretic Peptide: 81727 pg/mL (02-22 @ 00:58)    Lipid Profile:   HgA1c:   TSH:         PHYSICAL EXAM:  T(C): 36.6 (02-22-22 @ 09:21), Max: 36.6 (02-22-22 @ 09:21)  HR: 95 (02-22-22 @ 09:21) (95 - 103)  BP: 178/94 (02-22-22 @ 09:21) (157/80 - 189/98)  RR: 18 (02-22-22 @ 09:25) (18 - 18)  SpO2: 91% (02-22-22 @ 09:25) (91% - 100%)  Wt(kg): --     I&O's Summary      Gen: Appears well in NAD  HEENT:  (-)icterus (-)pallor  CV: N S1 S2 1/6 DIANA (+)2 Pulses B/l  Resp:  Clear to ausculatation B/L, normal effort  GI: (+) BS Soft, NT, ND  Lymph:  (-)Edema, (-)obvious lymphadenopathy  Skin: Warm to touch, Normal turgor  Psych: Appropriate mood and affect        TELEMETRY: 	      ECG:  	Sinus Tach 102 BPM    RADIOLOGY:         CXR:  REPORT PENDING    ASSESSMENT/PLAN: 	61y Male [facility] with a significant medical history of HTN, Anemia, CAD, HLD, T2DM, GERD, Adrenal Insuffiencey, Left BKA, prior GI Bleed, and ESRD on HD (TTS) was brought to the ED for complaints of shortness of breath x 1 day.    - borderline elevated trop in the setting opr renal disease of unclear clinical significance.  Presentation is inconsitent with ACS  - Echo  - Pt refusing tele  - HD per renal    I once again thank you for allowing me to participate in the care of your patient.  If you have any questions or concerns please do not hesitate to contact me.    Dominic Still MD, [facility]  BEEPER [phone]       C A R D I O L O G Y  *********************    DATE OF SERVICE: 02-22-22    HISTORY OF PRESENT ILLNESS: HPI:  Patient is a 62 y/o male from [facility] with a significant medical history of HTN, Anemia, CAD, HLD, T2DM, GERD, Adrenal Insuffiencey, Left BKA, prior GI Bleed, and ESRD on HD (TTS) was brought to the ED for complaints of shortness of breath x 1 day. Pt is AA0x3, but is not very cooperative with the interview and speaks with his eyes closed, giving very little information. As per transfer paperwork, he was found to have worsening acute onset shortness of breath requiring oxygen which was the reason for transfer. Patient denies using 02 at baseline but does use nebulization TID. He otherwise denies missing any medications, recent increase in salt/water intake, headaches, visual disturbances, N/V/D, dizziness, falls, cough, wheezing, chest pain, palpitations, or fevers. His last HD was on Saturday and he completed the full course without any issues. At present his leg swelling is at baseline, and he is comfortable on Venti mask while lying down.    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    Osteoarthritis    Vision loss of left eye    ESRD on hemodialysis    Cataract  both eyes - hx of sx done    BPH (benign prostatic hyperplasia)    UTI (urinary tract infection)  hx of    Bladder mass  hx of    H/O hematuria    Osteoporosis    Vision loss of right eye    Depression    Chronic GERD    Osteomyelitis of vertebra    Below knee amputation status, left  2012- pt is wearing prostesis    History of right cataract extraction    History of left cataract extraction    S/P arteriovenous (AV) fistula creation  right arm brachiocephalic arteriovenous fistula on 11/08/2018    H/O hematuria  s/p bladder bx and fulguration 2/25/2020    H/O transurethral destruction of bladder lesion  2020    History of excision of mass  back mass on 03/31/2021            MEDICATIONS:  MEDICATIONS  (STANDING):  ALBUTerol    90 MICROgram(s) HFA Inhaler 2 Puff(s) Inhalation every 6 hours  amLODIPine   Tablet 10 milliGRAM(s) Oral daily  cholecalciferol 1000 Unit(s) Oral daily  epoetin beverley-epbx (RETACRIT) Injectable 4000 Unit(s) IV Push <User Schedule>  ferrous    sulfate 325 milliGRAM(s) Oral daily  folic acid 1 milliGRAM(s) Oral daily  glucagon  Injectable 1 milliGRAM(s) IntraMuscular once  heparin   Injectable 5000 Unit(s) SubCutaneous every 12 hours  insulin glargine Injectable (LANTUS) 6 Unit(s) SubCutaneous at bedtime  insulin lispro (ADMELOG) corrective regimen sliding scale   SubCutaneous Before meals and at bedtime  metoclopramide 5 milliGRAM(s) Oral three times a day  multivitamin 1 Tablet(s) Oral daily  nortriptyline 10 milliGRAM(s) Oral at bedtime  pantoprazole    Tablet 40 milliGRAM(s) Oral before breakfast  sevelamer carbonate 2400 milliGRAM(s) Oral three times a day with meals  sodium zirconium cyclosilicate 10 Gram(s) Oral daily  sucralfate suspension 1 Gram(s) Oral four times a day      Allergies    fish (Rash)  liver (Anaphylaxis)  No Known Drug Allergies    Intolerances        FAMILY HISTORY:  Family history of cirrhosis of liver (Mother)    Family history of renal failure (Sibling)    Family history of hypertension (Sibling)    Family history of diabetes mellitus (Sibling)    History of substance abuse in sibling (Sibling)      Non-contributary for premature coronary disease or sudden cardiac death    SOCIAL HISTORY:    [ ] Non-smoker  [ ] Smoker  [ ] Alcohol    FLU VACCINE THIS YEAR STARTS IN AUGUST:  [ ] Yes    [ ] No    IF OVER 65 HAVE YOU EVER HAD A PNA VACCINE:  [ ] Yes    [ ] No       [ ] N/A      REVIEW OF SYSTEMS:  [ ]chest pain  [  ]shortness of breath  [  ]palpitations  [  ]syncope  [ ]near syncope [ ]upper extremity weakness   [ ] lower extremity weakness  [  ]diplopia  [  ]altered mental status   [  ]fevers  [ ]chills [ ]nausea  [ ]vomitting  [  ]dysphagia    [ ]abdominal pain  [ ]melena  [ ]BRBPR    [  ]epistaxis  [  ]rash    [ ]lower extremity edema        [X] All others negative	  [ ] Unable to obtain      LABS:	 	    CARDIAC MARKERS:        Troponin I, High Sensitivity Result: 86.1 ng/L (02-22-22 @ 00:58)                            9.8    5.45  )-----------( 109      ( 22 Feb 2022 00:58 )             31.6     Hb Trend: 9.8<--    02-22    138  |  104  |  32<H>  ----------------------------<  62<L>  4.9   |  27  |  9.57<H>    Ca    8.3<L>      22 Feb 2022 00:58    TPro  7.1  /  Alb  3.1<L>  /  TBili  0.5  /  DBili  x   /  AST  15  /  ALT  13  /  AlkPhos  95  02-22    Creatinine Trend: 9.57<--    Coags:  PT/INR - ( 22 Feb 2022 00:58 )   PT: 11.0 sec;   INR: 0.92 ratio         PTT - ( 22 Feb 2022 00:58 )  PTT:33.1 sec    proBNP: Serum Pro-Brain Natriuretic Peptide: 78664 pg/mL (02-22 @ 00:58)    Lipid Profile:   HgA1c:   TSH:         PHYSICAL EXAM:  T(C): 36.6 (02-22-22 @ 09:21), Max: 36.6 (02-22-22 @ 09:21)  HR: 95 (02-22-22 @ 09:21) (95 - 103)  BP: 178/94 (02-22-22 @ 09:21) (157/80 - 189/98)  RR: 18 (02-22-22 @ 09:25) (18 - 18)  SpO2: 91% (02-22-22 @ 09:25) (91% - 100%)  Wt(kg): --     I&O's Summary      Gen: left BKA  HEENT:  (-)icterus (-)pallor  CV: N S1 S2 1/6 DIANA (+)2 Pulses B/l  Resp:  Clear to ausculatation B/L, normal effort  GI: (+) BS Soft, NT, ND  Lymph:  (-)Edema, (-)obvious lymphadenopathy  Skin: Warm to touch, Normal turgor  Psych: Appropriate mood and affect        TELEMETRY: 	      ECG:  	Sinus Tach 102 BPM    RADIOLOGY:         CXR:  REPORT PENDING    ASSESSMENT/PLAN: 	61y Male [facility] with a significant medical history of HTN, Anemia, CAD, HLD, T2DM, GERD, Adrenal Insuffiencey, Left BKA, prior GI Bleed, and ESRD on HD (TTS) was brought to the ED for complaints of shortness of breath x 1 day.    - borderline elevated trop in the setting opr renal disease of unclear clinical significance.  Presentation is inconsitent with ACS  - Echo  - Pt refusing tele  - HD per renal    I once again thank you for allowing me to participate in the care of your patient.  If you have any questions or concerns please do not hesitate to contact me.    Dominic Still MD, [facility]  BEEPER [phone]

## 2022-02-22 NOTE — ED PROVIDER NOTE - PHYSICAL EXAMINATION
Heart is tachycardic and lungs are decreased at basis. Belly soft and nontender. No pitting edema on the right lower extremity. Patient has an amputation below the left knee.

## 2022-02-22 NOTE — PROGRESS NOTE ADULT - SUBJECTIVE AND OBJECTIVE BOX
Patient is a 61y old  Male who presents with a chief complaint of Shortness of Breath (2022 21:16)    PATIENT IS SEEN AND EXAMINED IN MEDICAL FLOOR.  NGT [    ]    JEREMY [   ]      GT [   ]    ALLERGIES:  fish (Rash)  liver (Anaphylaxis)  No Known Drug Allergies      Daily Height in cm: 167.64 (2022 00:07)    Daily Weight in k.5 (2022 14:26)    VITALS:    Vital Signs Last 24 Hrs  T(C): 36.4 (2022 22:45), Max: 37.2 (2022 14:26)  T(F): 97.6 (2022 22:45), Max: 99 (2022 14:26)  HR: 110 (2022 22:45) (94 - 110)  BP: 205/113 (2022 22:45) (154/88 - 205/113)  BP(mean): 144 (2022 22:45) (144 - 144)  RR: 17 (2022 22:45) (16 - 20)  SpO2: 100% (2022 22:45) (91% - 100%)    LABS:    CBC Full  -  ( 2022 11:47 )  WBC Count : 3.18 K/uL  RBC Count : 2.75 M/uL  Hemoglobin : 8.0 g/dL  Hematocrit : 25.5 %  Platelet Count - Automated : 95 K/uL  Mean Cell Volume : 92.7 fl  Mean Cell Hemoglobin : 29.1 pg  Mean Cell Hemoglobin Concentration : 31.4 gm/dL  Auto Neutrophil # : x  Auto Lymphocyte # : x  Auto Monocyte # : x  Auto Eosinophil # : x  Auto Basophil # : x  Auto Neutrophil % : x  Auto Lymphocyte % : x  Auto Monocyte % : x  Auto Eosinophil % : x  Auto Basophil % : x    PT/INR - ( 2022 00:58 )   PT: 11.0 sec;   INR: 0.92 ratio         PTT - ( 2022 00:58 )  PTT:33.1 sec      138  |  105  |  38<H>  ----------------------------<  125<H>  4.7   |  25  |  10.20<H>    Ca    7.8<L>      2022 11:47    TPro  7.1  /  Alb  3.1<L>  /  TBili  0.5  /  DBili  x   /  AST  15  /  ALT  13  /  AlkPhos  95  -    CAPILLARY BLOOD GLUCOSE      POCT Blood Glucose.: 82 mg/dL (2022 22:31)  POCT Blood Glucose.: 56 mg/dL (2022 22:04)  POCT Blood Glucose.: 218 mg/dL (2022 17:52)  POCT Blood Glucose.: 141 mg/dL (2022 10:58)    CARDIAC MARKERS ( 2022 11:47 )  x     / x     / 105 U/L / x     / 5.7 ng/mL      LIVER FUNCTIONS - ( 2022 00:58 )  Alb: 3.1 g/dL / Pro: 7.1 g/dL / ALK PHOS: 95 U/L / ALT: 13 U/L DA / AST: 15 U/L / GGT: x           Creatinine Trend: 10.20<--, 9.57<--  I&O's Summary    2022 07:01  -  2022 23:55  --------------------------------------------------------  IN: 600 mL / OUT: 3765 mL / NET: -3165 mL            .Blood Blood-Peripheral   @ 20:58   No Growth Final  --  --      .Blood Blood-Peripheral   @ 20:56   No Growth Final  --  --          MEDICATIONS:    MEDICATIONS  (STANDING):  ALBUTerol    90 MICROgram(s) HFA Inhaler 2 Puff(s) Inhalation every 6 hours  amLODIPine   Tablet 10 milliGRAM(s) Oral daily  cholecalciferol 1000 Unit(s) Oral daily  epoetin beverley-epbx (RETACRIT) Injectable 4000 Unit(s) IV Push <User Schedule>  ferrous    sulfate 325 milliGRAM(s) Oral daily  folic acid 1 milliGRAM(s) Oral daily  glucagon  Injectable 1 milliGRAM(s) IntraMuscular once  heparin   Injectable 5000 Unit(s) SubCutaneous every 12 hours  insulin glargine Injectable (LANTUS) 6 Unit(s) SubCutaneous at bedtime  insulin lispro (ADMELOG) corrective regimen sliding scale   SubCutaneous Before meals and at bedtime  metoclopramide 5 milliGRAM(s) Oral three times a day  multivitamin 1 Tablet(s) Oral daily  nortriptyline 10 milliGRAM(s) Oral at bedtime  pantoprazole    Tablet 40 milliGRAM(s) Oral before breakfast  sevelamer carbonate 2400 milliGRAM(s) Oral three times a day with meals  sodium zirconium cyclosilicate 10 Gram(s) Oral daily  sucralfate suspension 1 Gram(s) Oral four times a day      MEDICATIONS  (PRN):  acetaminophen     Tablet .. 650 milliGRAM(s) Oral every 6 hours PRN Temp greater or equal to 38C (100.4F), Mild Pain (1 - 3)  melatonin 3 milliGRAM(s) Oral at bedtime PRN Insomnia  ondansetron Injectable 4 milliGRAM(s) IV Push every 8 hours PRN Nausea and/or Vomiting  zolpidem 5 milliGRAM(s) Oral at bedtime PRN Insomnia      REVIEW OF SYSTEMS:                           ALL ROS DONE [ X   ]    CONSTITUTIONAL:  LETHARGIC [   ], FEVER [   ], UNRESPONSIVE [   ]  CVS:  CP  [   ], SOB, [   ], PALPITATIONS [   ], DIZZYNESS [   ]  RS: COUGH [   ], SPUTUM [   ]  GI: ABDOMINAL PAIN [   ], NAUSEA [   ], VOMITINGS [   ], DIARRHEA [   ], CONSTIPATION [   ]  :  DYSURIA [   ], NOCTURIA [   ], INCREASED FREQUENCY [   ], DRIBLING [   ],  SKELETAL: PAINFUL JOINTS [   ], SWOLLEN JOINTS [   ], NECK ACHE [   ], LOW BACK ACHE [   ],  SKIN : ULCERS [   ], RASH [   ], ITCHING [   ]  CNS: HEAD ACHE [   ], DOUBLE VISION [   ], BLURRED VISION [   ], AMS / CONFUSION [   ], SEIZURES [   ], WEAKNESS [   ],TINGLING / NUMBNESS [   ]    PHYSICAL EXAMINATION:  GENERAL APPEARANCE: NO DISTRESS  HEENT:  NO PALLOR, NO  JVD,  NO   NODES, NECK SUPPLE  CVS: S1 +, S2 +,   RS: AEEB,  OCCASIONAL  RALES +,   NO RONCHI  ABD: SOFT, NT, NO, BS +  EXT: NO PE   , LEFT BKA +  SKIN: WARM, RIGHT UPPER EXTREMITY AF +  SKELETAL:  ROM ACCEPTABLE  CNS:  AAO X 3,  VISUALLY IMPAIRED +    RADIOLOGY :    < from: CT Abdomen and Pelvis No Cont (21 @ 14:07) >  IMPRESSION:  *  Mild pulmonary edema and trace bilateral pleural effusions.  *  Moderate cardiomegaly and trace pericardial effusion.  *  Thickening and patulous esophagus again noted as before.        < end of copied text >  < from: CT Chest No Cont (21 @ 14:07) >  IMPRESSION:  *  Mild pulmonary edema and trace bilateral pleural effusions.  *  Moderate cardiomegaly and trace pericardial effusion.  *  Thickening and patulous esophagus again noted as before.      < end of copied text >        ASSESSMENT :     Hematemesis      Hypertension    Adrenal insufficiency    CKD (chronic kidney disease)    Anemia    Glaucoma    Coronary artery disease    HLD (hyperlipidemia)    Peripheral vascular disease    Spinal stenosis of lumbosacral region    Hyperparathyroidism    Diabetes mellitus    Diabetic neuropathy    Contracture of hand    Osteoarthritis    Osteoporosis    Vision loss of left eye    ESRD on hemodialysis    Cataract    BPH (benign prostatic hyperplasia)    UTI (urinary tract infection)    Bladder mass    H/O hematuria    Osteoporosis    Vision loss of right eye    Depression    Chronic GERD    Osteomyelitis of vertebra    Below knee amputation status, left    History of right cataract extraction    History of left cataract extraction    S/P arteriovenous (AV) fistula creation    H/O hematuria    H/O transurethral destruction of bladder lesion    History of excision of mass        PLAN:  HPI:  Patient is a 62 y/o male from [facility] with a past medical history of hypertension, anemia, CAD, hyperlipidemia, diabetes mellitus, GERD and adrenal insuffiencey, ESRD on HD T,,S reports to the ED for vomiting blood. Patient noted to  have one episode of dark vomiting and complains of nausea. Patient denies any abdominal pain, dysphagia, hematochezia or melena. Patient denies any changes to appetite or weight. Patient denies any prior upper endoscopy or prior episodes. Patient also missed dialysis yesterday. Patient, however, states he had dialysis yesterday and is scheduled for the next dialysis appointment on Saturday. Patient's dialysis schedule is , , S. Patient denies any cp, sob or palpitations. Denies lower extremity swelling. Denies fevers or chills, sob or cough.    (2022 16:07)      - COUNSELLED PATIENT AT LENGTH ON THE IMPORTANCE OF EVALUATION, MEDICATION COMPLIANCE, BLOOD WORK, IV ACCESS, COMPLETE HD SESSIONS, EATING MEALS REGULARLY [GIVEN DM]. PATIENT VERBALIZED UNDERSTANDING REGARDING RISKS INCLUDE BUT ARE NOT LIMITED TO ARRYTHMIA, CVA, MI AND DEATH.     # ACUTE HYPOXIC RESPIRATORY FAILURE, ACUTE ON CHRONIC PULMONARY EDEMA, HYPERTENSIVE URGENCY   - SUPPLEMENTAL OXYGEN -- WAS TOLERATING ROOM AIR PRIOR TO AND POST-HD  - PLACED ON AMLODIPINE, HYDRALAZINE; GIVEN ONE DOSE OF METOPROLOL   - CARDIOLOGY CONSULT IN PROGRESS  - NEPHROLOGY CONSULT    - F/U ECHOCARDIOGRAM, DESPITE EXTENSIVE COUNSELLING PATIENT REFUSED TELEMETRY    - PATIENT IMPROVED AFTER HD     # UNDERLYING DM W/ HYPOGLYCEMIA - PATIENT IS REFUSING TO EAT/DRINK - FINGERSTICKS AND ENCOURAGING PO INTAKE, D50 PUSH + D5W IF BLOOD SUGAR DOES NOT IMPROVE  - HOLD INSULIN    # RECURRENT EMESIS W/ HISTORY OF ESOPHAGITIS AND DUODENITIS [2021], HX OF GASTROPARESIS   # SEVERE PROTEIN CALORIE MALNUTRITION, FAILURE TO THRIVE - NUTRITIONAL SUPPLEMENT  # ANEMIA OF CKD  # HLD  # ESRD ON HD TTS - W/ HX OF NONCOMPLIANCE - NEPHROLOGY CONSULT IN PROGRESS  # HTN  # DM   # PARTIALLY BLIND  # S/P LEFT BKA  # HX OF PVD  # LS SPINAL STENOSIS  # GI AND DVT PPX

## 2022-02-22 NOTE — CONSULT NOTE ADULT - ASSESSMENT
Patient is a 61y Male whom presented to the hospital with shortness of breath for a day.  Has ESRD on HD at [facility]. He is non-compliant with HD prescription ( cuts time and misses treatments). also with dietary indiscretions.  In ED had pulmonary edema and hypoxia  Seen post HD and feels better.    # ESRD admitted with pulmonary edema. s/p hemodialysis earlier with significant improvement of symptoms.  Discussed compliance with HD prescription  # anemia of CKD. retacrit given on HD   # CKDMBD. CONT SEVELAMER    THANKS FOR THE CONSULT

## 2022-02-22 NOTE — ED PROVIDER NOTE - INTERPRETATION
abnormal Cimetidine Counseling:  I discussed with the patient the risks of Cimetidine including but not limited to gynecomastia, headache, diarrhea, nausea, drowsiness, arrhythmias, pancreatitis, skin rashes, psychosis, bone marrow suppression and kidney toxicity.

## 2022-02-22 NOTE — CHART NOTE - NSCHARTNOTEFT_GEN_A_CORE
EVENT:   2/22/22, 9:53pm, patient's BP - 178/90, HR - 104, RR - 20, O2sat - 98% PA. FS - 56. Orange juice was given At 10:31pm, FS - 82.   HPI:       62 [facility] significant medical history of HTN, Anemia, CAD, HLD, T2DM, GERD, Adrenal Insuffiencey, Left BKA, prior GI Bleed, and ESRD on HD (TTS) was brought to the ED for complaints of shortness of breath x 1 day. Pt is AA0x3, but is not very cooperative with the interview and speaks with his eyes closed, giving very little information. As per transfer paperwork, he was found to have worsening acute onset shortness of breath requiring oxygen which was the reason for transfer. Patient denies using 02 at baseline but does use nebulization TID. He otherwise denies missing any medications, recent increase in salt/water intake, headaches, visual disturbances, N/V/D, dizziness, falls, cough, wheezing, chest pain, palpitations, or fevers. His last HD was on Saturday and he completed the full course without any issues. At present his leg swelling is at baseline, and he is comfortable on Venti mask while lying down.    (22 Feb 2022 04:56)    OBJECTIVE:  Vital Signs Last 24 Hrs  T(C): 36.4 (22 Feb 2022 22:45), Max: 37.2 (22 Feb 2022 14:26)  T(F): 97.6 (22 Feb 2022 22:45), Max: 99 (22 Feb 2022 14:26)  HR: 110 (22 Feb 2022 22:45) (94 - 110)  BP: 205/113 (22 Feb 2022 22:45) (154/88 - 205/113)  BP(mean): 144 (22 Feb 2022 22:45) (144 - 144)  RR: 17 (22 Feb 2022 22:45) (16 - 20)  SpO2: 100% (22 Feb 2022 22:45) (91% - 100%)    FOCUSED PHYSICAL EXAM:    LABS:                        8.0    3.18  )-----------( 95       ( 22 Feb 2022 11:47 )             25.5   CARDIAC MARKERS ( 22 Feb 2022 11:47 )  x     / x     / 105 U/L / x     / 5.7 ng/mL    02-22    138  |  105  |  38<H>  ----------------------------<  125<H>  4.7   |  25  |  10.20<H>    Ca    7.8<L>      22 Feb 2022 11:47    TPro  7.1  /  Alb  3.1<L>  /  TBili  0.5  /  DBili  x   /  AST  15  /  ALT  13  /  AlkPhos  95  02-22    PLAN:   - Metoprolol 25 mg po x1 dose stat,     FOLLOW UP / RESULT:   - Re-evaluate patient BP, HR and blood sugar,   - Maintain safety measures. EVENT:   2/22/22, 9:53pm, patient's BP - 178/90, HR - 104, RR - 20, O2sat - 98% PA. FS - 56. Orange juice was given At 10:31pm, FS - 82.   2/22/23, 10:45pm, BP - 205/113, HR -110, Dr. De La Torre called. Hydralazine 25 mg po was given .   2/23/22,    HPI:       62 y/o male from [facility] with a significant medical history of HTN, Anemia, CAD, HLD, T2DM, GERD, Adrenal Insuffiencey, Left BKA, prior GI Bleed, and ESRD on HD (TTS) was brought to the ED for complaints of shortness of breath x 1 day. Pt is AA0x3, but is not very cooperative with the interview and speaks with his eyes closed, giving very little information. As per transfer paperwork, he was found to have worsening acute onset shortness of breath requiring oxygen which was the reason for transfer. Patient denies using 02 at baseline but does use nebulization TID. He otherwise denies missing any medications, recent increase in salt/water intake, headaches, visual disturbances, N/V/D, dizziness, falls, cough, wheezing, chest pain, palpitations, or fevers. His last HD was on Saturday and he completed the full course without any issues. At present his leg swelling is at baseline, and he is comfortable on Venti mask while lying down.    (22 Feb 2022 04:56)    OBJECTIVE:  Vital Signs Last 24 Hrs  T(C): 36.4 (22 Feb 2022 22:45), Max: 37.2 (22 Feb 2022 14:26)  T(F): 97.6 (22 Feb 2022 22:45), Max: 99 (22 Feb 2022 14:26)  HR: 110 (22 Feb 2022 22:45) (94 - 110)  BP: 205/113 (22 Feb 2022 22:45) (154/88 - 205/113)  BP(mean): 144 (22 Feb 2022 22:45) (144 - 144)  RR: 17 (22 Feb 2022 22:45) (16 - 20)  SpO2: 100% (22 Feb 2022 22:45) (91% - 100%)    FOCUSED PHYSICAL EXAM:    LABS:                        8.0    3.18  )-----------( 95       ( 22 Feb 2022 11:47 )             25.5   CARDIAC MARKERS ( 22 Feb 2022 11:47 )  x     / x     / 105 U/L / x     / 5.7 ng/mL    02-22    138  |  105  |  38<H>  ----------------------------<  125<H>  4.7   |  25  |  10.20<H>    Ca    7.8<L>      22 Feb 2022 11:47    TPro  7.1  /  Alb  3.1<L>  /  TBili  0.5  /  DBili  x   /  AST  15  /  ALT  13  /  AlkPhos  95  02-22    PLAN:   - Metoprolol 25 mg po x1 dose stat,     FOLLOW UP / RESULT:   - Re-evaluate patient BP, HR and blood sugar,   - Maintain safety measures. EVENT:   2/22/22, 9:53pm, patient's BP - 178/90, HR - 104, RR - 20, O2sat - 98% PA. FS - 56. Orange juice was given At 10:31pm, FS - 82.   2/22/23, 10:45pm, BP - 205/113, HR -110, RR -17, O2sat - 100% NRB 15 L . Dr. De La Torre called. Hydralazine 25 mg po was given .   2/23/22, 12:22am, BP - 165/91, HR - 104.   2/23/22, 3am, called by RN re: patient refused cardiac monitoring.   HPI:       60 y/o male from [facility] with a significant medical history of HTN, Anemia, CAD, HLD, T2DM, GERD, Adrenal Insuffiencey, Left BKA, prior GI Bleed, and ESRD on HD (TTS) was brought to the ED for complaints of shortness of breath x 1 day. Pt is AA0x3, but is not very cooperative with the interview and speaks with his eyes closed, giving very little information. As per transfer paperwork, he was found to have worsening acute onset shortness of breath requiring oxygen which was the reason for transfer. Patient denies using 02 at baseline but does use nebulization TID. He otherwise denies missing any medications, recent increase in salt/water intake, headaches, visual disturbances, N/V/D, dizziness, falls, cough, wheezing, chest pain, palpitations, or fevers. His last HD was on Saturday and he completed the full course without any issues. At present his leg swelling is at baseline, and he is comfortable on Venti mask while lying down.    (22 Feb 2022 04:56)    OBJECTIVE:  Vital Signs Last 24 Hrs  T(C): 36.4 (22 Feb 2022 22:45), Max: 37.2 (22 Feb 2022 14:26)  T(F): 97.6 (22 Feb 2022 22:45), Max: 99 (22 Feb 2022 14:26)  HR: 110 (22 Feb 2022 22:45) (94 - 110)  BP: 205/113 (22 Feb 2022 22:45) (154/88 - 205/113)  BP(mean): 144 (22 Feb 2022 22:45) (144 - 144)  RR: 17 (22 Feb 2022 22:45) (16 - 20)  SpO2: 100% (22 Feb 2022 22:45) (91% - 100%)    FOCUSED PHYSICAL EXAM:    LABS:                        8.0    3.18  )-----------( 95       ( 22 Feb 2022 11:47 )             25.5   CARDIAC MARKERS ( 22 Feb 2022 11:47 )  x     / x     / 105 U/L / x     / 5.7 ng/mL    02-22    138  |  105  |  38<H>  ----------------------------<  125<H>  4.7   |  25  |  10.20<H>    Ca    7.8<L>      22 Feb 2022 11:47    TPro  7.1  /  Alb  3.1<L>  /  TBili  0.5  /  DBili  x   /  AST  15  /  ALT  13  /  AlkPhos  95  02-22    PLAN:   - Metoprolol 25 mg po x1 dose stat,     FOLLOW UP / RESULT:   - Re-evaluate patient BP, HR and blood sugar,   - Maintain safety measures.

## 2022-02-22 NOTE — CONSULT NOTE ADULT - SUBJECTIVE AND OBJECTIVE BOX
Time of visit: pat seen and examine in dialysis unit earlier today     CHIEF COMPLAINT: Patient is a 61y old  Male who presents with a chief complaint of Shortness of Breath (22 Feb 2022 16:57)      HPI:  Patient is a 60 y/o male from [facility] with a significant medical history of HTN, Anemia, CAD, HLD, T2DM, GERD, Adrenal Insuffiencey, Left BKA, prior GI Bleed, and ESRD on HD (TTS) was brought to the ED for complaints of shortness of breath x 1 day. Pt is AA0x3, but is not very cooperative with the interview and speaks with his eyes closed, giving very little information. As per transfer paperwork, he was found to have worsening acute onset shortness of breath requiring oxygen which was the reason for transfer. Patient denies using 02 at baseline but does use nebulization TID. He otherwise denies missing any medications, recent increase in salt/water intake, headaches, visual disturbances, N/V/D, dizziness, falls, cough, wheezing, chest pain, palpitations, or fevers. His last HD was on Saturday and he completed the full course without any issues. At present his leg swelling is at baseline, and he is comfortable on Venti mask while lying down.    (22 Feb 2022 04:56)   Patient seen and examined.     PAST MEDICAL & SURGICAL HISTORY:  Hypertension    Adrenal insufficiency    Anemia    Glaucoma    Coronary artery disease    HLD (hyperlipidemia)    Peripheral vascular disease    Spinal stenosis of lumbosacral region    Hyperparathyroidism    Diabetes mellitus    Diabetic neuropathy    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    Osteoarthritis    Vision loss of left eye    ESRD on hemodialysis    Cataract  both eyes - hx of sx done    BPH (benign prostatic hyperplasia)    UTI (urinary tract infection)  hx of    Bladder mass  hx of    H/O hematuria    Osteoporosis    Vision loss of right eye    Depression    Chronic GERD    Osteomyelitis of vertebra    Below knee amputation status, left  2012- pt is wearing prostesis    History of right cataract extraction    History of left cataract extraction    S/P arteriovenous (AV) fistula creation  right arm brachiocephalic arteriovenous fistula on 11/08/2018    H/O hematuria  s/p bladder bx and fulguration 2/25/2020    H/O transurethral destruction of bladder lesion  2020    History of excision of mass  back mass on 03/31/2021        Allergies    fish (Rash)  liver (Anaphylaxis)  No Known Drug Allergies    Intolerances        MEDICATIONS  (STANDING):  ALBUTerol    90 MICROgram(s) HFA Inhaler 2 Puff(s) Inhalation every 6 hours  amLODIPine   Tablet 10 milliGRAM(s) Oral daily  cholecalciferol 1000 Unit(s) Oral daily  epoetin beverley-epbx (RETACRIT) Injectable 4000 Unit(s) IV Push <User Schedule>  ferrous    sulfate 325 milliGRAM(s) Oral daily  folic acid 1 milliGRAM(s) Oral daily  glucagon  Injectable 1 milliGRAM(s) IntraMuscular once  heparin   Injectable 5000 Unit(s) SubCutaneous every 12 hours  insulin glargine Injectable (LANTUS) 6 Unit(s) SubCutaneous at bedtime  insulin lispro (ADMELOG) corrective regimen sliding scale   SubCutaneous Before meals and at bedtime  metoclopramide 5 milliGRAM(s) Oral three times a day  multivitamin 1 Tablet(s) Oral daily  nortriptyline 10 milliGRAM(s) Oral at bedtime  pantoprazole    Tablet 40 milliGRAM(s) Oral before breakfast  sevelamer carbonate 2400 milliGRAM(s) Oral three times a day with meals  sodium zirconium cyclosilicate 10 Gram(s) Oral daily  sucralfate suspension 1 Gram(s) Oral four times a day      MEDICATIONS  (PRN):  acetaminophen     Tablet .. 650 milliGRAM(s) Oral every 6 hours PRN Temp greater or equal to 38C (100.4F), Mild Pain (1 - 3)  melatonin 3 milliGRAM(s) Oral at bedtime PRN Insomnia  ondansetron Injectable 4 milliGRAM(s) IV Push every 8 hours PRN Nausea and/or Vomiting  zolpidem 5 milliGRAM(s) Oral at bedtime PRN Insomnia   Medications up to date at time of exam.    Medications up to date at time of exam.    FAMILY HISTORY:  Family history of cirrhosis of liver (Mother)    Family history of renal failure (Sibling)    Family history of hypertension (Sibling)    Family history of diabetes mellitus (Sibling)    History of substance abuse in sibling (Sibling)        SOCIAL HISTORY  Smoking History: [   ] smoking/smoke exposure, [   ] former smoker  Living Condition: [   ] apartment, [   ] private house  Work History:   Travel History: denies recent travel  Illicit Substance Use: denies  Alcohol Use: denies    REVIEW OF SYSTEMS:    CONSTITUTIONAL:  denies fevers, chills, sweats, weight loss    HEENT:  denies diplopia or blurred vision, sore throat or runny nose.    CARDIOVASCULAR:  denies pressure, squeezing, tightness, or heaviness about the chest; no palpitations.    RESPIRATORY:  denies SOB, cough, EDGE, wheezing.    GASTROINTESTINAL:  denies abdominal pain, nausea, vomiting or diarrhea.    GENITOURINARY: denies dysuria, frequency or urgency.    NEUROLOGIC:  denies numbness, tingling, seizures or weakness.    PSYCHIATRIC:  denies disorder of thought or mood.    MSK: denies swelling, redness      PHYSICAL EXAMINATION:    GENERAL: The patient is a well-developed, well-nourished, in no apparent distress.     Vital Signs Last 24 Hrs  T(C): 36.4 (22 Feb 2022 20:26), Max: 37.2 (22 Feb 2022 14:26)  T(F): 97.6 (22 Feb 2022 20:26), Max: 99 (22 Feb 2022 14:26)  HR: 104 (22 Feb 2022 20:26) (94 - 104)  BP: 178/90 (22 Feb 2022 20:26) (154/88 - 189/98)  BP(mean): --  RR: 20 (22 Feb 2022 20:26) (16 - 20)  SpO2: 98% (22 Feb 2022 20:26) (91% - 100%)   (if applicable)    Chest Tube (if applicable)    HEENT: Head is normocephalic and atraumatic. Extraocular muscles are intact. Mucous membranes are moist.     NECK: Supple, no palpable adenopathy.    LUNGS: Clear to auscultation, no wheezing, rales, or rhonchi.    HEART: Regular rate and rhythm without murmur.    ABDOMEN: Soft, nontender, and nondistended.  No hepatosplenomegaly is noted.    RENAL: No difficulty voiding, no pelvic pain    EXTREMITIES: Without any cyanosis, clubbing, rash, lesions or edema.    NEUROLOGIC: Awake, alert, oriented, grossly intact    SKIN: Warm, dry, good turgor.      LABS:                        8.0    3.18  )-----------( 95       ( 22 Feb 2022 11:47 )             25.5     02-22    138  |  105  |  38<H>  ----------------------------<  125<H>  4.7   |  25  |  10.20<H>    Ca    7.8<L>      22 Feb 2022 11:47    TPro  7.1  /  Alb  3.1<L>  /  TBili  0.5  /  DBili  x   /  AST  15  /  ALT  13  /  AlkPhos  95  02-22    PT/INR - ( 22 Feb 2022 00:58 )   PT: 11.0 sec;   INR: 0.92 ratio         PTT - ( 22 Feb 2022 00:58 )  PTT:33.1 sec      CARDIAC MARKERS ( 22 Feb 2022 11:47 )  x     / x     / 105 U/L / x     / 5.7 ng/mL        Serum Pro-Brain Natriuretic Peptide: 41311 pg/mL (02-22-22 @ 00:58)          MICROBIOLOGY: (if applicable)    RADIOLOGY & ADDITIONAL STUDIES:  EKG:   CXR:  ECHO:    IMPRESSION: 61y Male PAST MEDICAL & SURGICAL HISTORY:  Hypertension    Adrenal insufficiency    Anemia    Glaucoma    Coronary artery disease    HLD (hyperlipidemia)    Peripheral vascular disease    Spinal stenosis of lumbosacral region    Hyperparathyroidism    Diabetes mellitus    Diabetic neuropathy    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    Osteoarthritis    Vision loss of left eye    ESRD on hemodialysis    Cataract  both eyes - hx of sx done    BPH (benign prostatic hyperplasia)    UTI (urinary tract infection)  hx of    Bladder mass  hx of    H/O hematuria    Osteoporosis    Vision loss of right eye    Depression    Chronic GERD    Osteomyelitis of vertebra    Below knee amputation status, left  2012- pt is wearing prostesis    History of right cataract extraction    History of left cataract extraction    S/P arteriovenous (AV) fistula creation  right arm brachiocephalic arteriovenous fistula on 11/08/2018    H/O hematuria  s/p bladder bx and fulguration 2/25/2020    H/O transurethral destruction of bladder lesion  2020    History of excision of mass  back mass on 03/31/2021     p/w                   RECOMMENDATIONS:   Time of visit: pat seen and examine in dialysis unit earlier today     CHIEF COMPLAINT: Patient is a 61y old  Male who presents with a chief complaint of Shortness of Breath (22 Feb 2022 16:57)      HPI:  Patient is a 60 y/o male from [facility] with a significant medical history of HTN, Anemia, CAD, HLD, T2DM, GERD, Adrenal Insuffiencey, Left BKA, prior GI Bleed, and ESRD on HD (TTS) was brought to the ED for complaints of shortness of breath x 1 day. Pt is AA0x3, but is not very cooperative with the interview and speaks with his eyes closed, giving very little information. As per transfer paperwork, he was found to have worsening acute onset shortness of breath requiring oxygen which was the reason for transfer. Patient denies using 02 at baseline but does use nebulization TID. He otherwise denies missing any medications, recent increase in salt/water intake, headaches, visual disturbances, N/V/D, dizziness, falls, cough, wheezing, chest pain, palpitations, or fevers. His last HD was on Saturday and he completed the full course without any issues. At present his leg swelling is at baseline, and he is comfortable on Venti mask while lying down.    (22 Feb 2022 04:56)   Patient seen and examined.     PAST MEDICAL & SURGICAL HISTORY:  Hypertension    Adrenal insufficiency    Anemia    Glaucoma    Coronary artery disease    HLD (hyperlipidemia)    Peripheral vascular disease    Spinal stenosis of lumbosacral region    Hyperparathyroidism    Diabetes mellitus    Diabetic neuropathy    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    Osteoarthritis    Vision loss of left eye    ESRD on hemodialysis    Cataract  both eyes - hx of sx done    BPH (benign prostatic hyperplasia)    UTI (urinary tract infection)  hx of    Bladder mass  hx of    H/O hematuria    Osteoporosis    Vision loss of right eye    Depression    Chronic GERD    Osteomyelitis of vertebra    Below knee amputation status, left  2012- pt is wearing prostesis    History of right cataract extraction    History of left cataract extraction    S/P arteriovenous (AV) fistula creation  right arm brachiocephalic arteriovenous fistula on 11/08/2018    H/O hematuria  s/p bladder bx and fulguration 2/25/2020    H/O transurethral destruction of bladder lesion  2020    History of excision of mass  back mass on 03/31/2021        Allergies    fish (Rash)  liver (Anaphylaxis)  No Known Drug Allergies    Intolerances        MEDICATIONS  (STANDING):  ALBUTerol    90 MICROgram(s) HFA Inhaler 2 Puff(s) Inhalation every 6 hours  amLODIPine   Tablet 10 milliGRAM(s) Oral daily  cholecalciferol 1000 Unit(s) Oral daily  epoetin beverley-epbx (RETACRIT) Injectable 4000 Unit(s) IV Push <User Schedule>  ferrous    sulfate 325 milliGRAM(s) Oral daily  folic acid 1 milliGRAM(s) Oral daily  glucagon  Injectable 1 milliGRAM(s) IntraMuscular once  heparin   Injectable 5000 Unit(s) SubCutaneous every 12 hours  insulin glargine Injectable (LANTUS) 6 Unit(s) SubCutaneous at bedtime  insulin lispro (ADMELOG) corrective regimen sliding scale   SubCutaneous Before meals and at bedtime  metoclopramide 5 milliGRAM(s) Oral three times a day  multivitamin 1 Tablet(s) Oral daily  nortriptyline 10 milliGRAM(s) Oral at bedtime  pantoprazole    Tablet 40 milliGRAM(s) Oral before breakfast  sevelamer carbonate 2400 milliGRAM(s) Oral three times a day with meals  sodium zirconium cyclosilicate 10 Gram(s) Oral daily  sucralfate suspension 1 Gram(s) Oral four times a day      MEDICATIONS  (PRN):  acetaminophen     Tablet .. 650 milliGRAM(s) Oral every 6 hours PRN Temp greater or equal to 38C (100.4F), Mild Pain (1 - 3)  melatonin 3 milliGRAM(s) Oral at bedtime PRN Insomnia  ondansetron Injectable 4 milliGRAM(s) IV Push every 8 hours PRN Nausea and/or Vomiting  zolpidem 5 milliGRAM(s) Oral at bedtime PRN Insomnia   Medications up to date at time of exam.    Medications up to date at time of exam.    FAMILY HISTORY:  Family history of cirrhosis of liver (Mother)    Family history of renal failure (Sibling)    Family history of hypertension (Sibling)    Family history of diabetes mellitus (Sibling)    History of substance abuse in sibling (Sibling)        SOCIAL HISTORY  Smoking History: [   ] smoking/smoke exposure, [   ] former smoker  Living Condition: [   ] apartment, [   ] private house  Work History:   Travel History: denies recent travel  Illicit Substance Use: denies  Alcohol Use: denies    REVIEW OF SYSTEMS:    CONSTITUTIONAL:  denies fevers, chills, sweats, weight loss    HEENT:  denies diplopia or blurred vision, sore throat or runny nose.    CARDIOVASCULAR:  denies pressure, squeezing, tightness, or heaviness about the chest; no palpitations.    RESPIRATORY:  denies SOB, cough, EDGE, wheezing.    GASTROINTESTINAL:  denies abdominal pain, nausea, vomiting or diarrhea.    GENITOURINARY: denies dysuria, frequency or urgency.    NEUROLOGIC:  denies numbness, tingling, seizures or weakness.    PSYCHIATRIC:  denies disorder of thought or mood.    MSK: denies swelling, redness      PHYSICAL EXAMINATION:    GENERAL: The patient is a well-developed, well-nourished, in no apparent distress.     Vital Signs Last 24 Hrs  T(C): 36.4 (22 Feb 2022 20:26), Max: 37.2 (22 Feb 2022 14:26)  T(F): 97.6 (22 Feb 2022 20:26), Max: 99 (22 Feb 2022 14:26)  HR: 104 (22 Feb 2022 20:26) (94 - 104)  BP: 178/90 (22 Feb 2022 20:26) (154/88 - 189/98)  BP(mean): --  RR: 20 (22 Feb 2022 20:26) (16 - 20)  SpO2: 98% (22 Feb 2022 20:26) (91% - 100%)   (if applicable)    Chest Tube (if applicable)    HEENT: Head is normocephalic and atraumatic. Extraocular muscles are intact. Mucous membranes are moist.     NECK: Supple, no palpable adenopathy.    LUNGS: Clear to auscultation, no wheezing, rales, or rhonchi.    HEART: Regular rate and rhythm without murmur.    ABDOMEN: Soft, nontender, and nondistended.  No hepatosplenomegaly is noted.    RENAL: No difficulty voiding, no pelvic pain    EXTREMITIES: Without any cyanosis, clubbing, rash, lesions or edema. + Left  BKA + right leg plus 1 edema     NEUROLOGIC: Awake, alert, oriented, grossly intact    SKIN: Warm, dry, good turgor.      LABS:                        8.0    3.18  )-----------( 95       ( 22 Feb 2022 11:47 )             25.5     02-22    138  |  105  |  38<H>  ----------------------------<  125<H>  4.7   |  25  |  10.20<H>    Ca    7.8<L>      22 Feb 2022 11:47    TPro  7.1  /  Alb  3.1<L>  /  TBili  0.5  /  DBili  x   /  AST  15  /  ALT  13  /  AlkPhos  95  02-22    PT/INR - ( 22 Feb 2022 00:58 )   PT: 11.0 sec;   INR: 0.92 ratio         PTT - ( 22 Feb 2022 00:58 )  PTT:33.1 sec      CARDIAC MARKERS ( 22 Feb 2022 11:47 )  x     / x     / 105 U/L / x     / 5.7 ng/mL        Serum Pro-Brain Natriuretic Peptide: 83110 pg/mL (02-22-22 @ 00:58)          MICROBIOLOGY: (if applicable)    RADIOLOGY & ADDITIONAL STUDIES:  EKG:   CXR:< from: Xray Chest 1 View-PORTABLE IMMEDIATE (02.22.22 @ 01:20) >    ACC: [id number] EXAM:  XR CHEST PORTABLE IMMED 1V                          PROCEDURE DATE:  02/22/2022          INTERPRETATION:  Chest one view    HISTORY: Shortness of breath    COMPARISON STUDY: 12/23/2021    Frontal expiratory view of the chest shows the heart to be enlarged in   size. The lungs show mild pulmonary congestion with questionable patchy   left infiltrate and there is no evidence of pneumothorax nor pleural   effusion.    IMPRESSION:  Mild congestion. Questionable left infiltrate.Follow-up study is   recommended as clinically warranted.        Thank you for the courtesy of this referral.    --- End of Report ---            DEX MORLEY MD; Attending Interventional Radiologist  This document has been electronically signed.Feb 22 2022  1:54PM    < end of copied text >    ECHO:    IMPRESSION: 61y Male PAST MEDICAL & SURGICAL HISTORY:  Hypertension    Adrenal insufficiency    Anemia    Glaucoma    Coronary artery disease    HLD (hyperlipidemia)    Peripheral vascular disease    Spinal stenosis of lumbosacral region    Hyperparathyroidism    Diabetes mellitus    Diabetic neuropathy    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    Osteoarthritis    Vision loss of left eye    ESRD on hemodialysis    Cataract  both eyes - hx of sx done    BPH (benign prostatic hyperplasia)    UTI (urinary tract infection)  hx of    Bladder mass  hx of    H/O hematuria    Osteoporosis    Vision loss of right eye    Depression    Chronic GERD    Osteomyelitis of vertebra    Below knee amputation status, left  2012- pt is wearing prostesis    History of right cataract extraction    History of left cataract extraction    S/P arteriovenous (AV) fistula creation  right arm brachiocephalic arteriovenous fistula on 11/08/2018    H/O hematuria  s/p bladder bx and fulguration 2/25/2020    H/O transurethral destruction of bladder lesion  2020    History of excision of mass  back mass on 03/31/2021     p/w           IMP: This is a 61 yr old  man  from [facility] with a significant medical history of HTN, Anemia, CAD, HLD, T2DM, GERD, Adrenal Insuffiencey, Left BKA, prior GI Bleed, and ESRD on HD (TTS) was brought to the ED for complaints of shortness of breath x 1 day. Pat admitted for acute hypoxic resp failure due to pulmonary edema from fluid over load.  Pat seen in dialysis suit stable .       Sugg:  - Continue dialysis as per Neph  - Taper off O2 supp  - Na / diet compliance   - Monitor blood sugar with coverage   - DVT GI prophy

## 2022-02-22 NOTE — CONSULT NOTE ADULT - SUBJECTIVE AND OBJECTIVE BOX
[facility] : Progress Note :: [phone], (office [phone]),   Dr Mosher / Dr Washington / Dr Young / Dr Doll / Dr Varsha TURCIOS / Dr Tello / Dr Garcia / Dr Larry qiu  _____________________________________________________________________________________________  Patient is a 61y Male whom presented to the hospital with shortness of breath for a day.  Has ESRD on HD at [facility]. He is non-compliant with HD prescription ( cuts time and misses treatments). also with dietary indiscretions.  In ED had pulmonary edema and hypoxia  Seen post HD and feels better.    PAST MEDICAL & SURGICAL HISTORY:  Hypertension    Adrenal insufficiency    Anemia    Glaucoma    Coronary artery disease    HLD (hyperlipidemia)    Peripheral vascular disease    Spinal stenosis of lumbosacral region    Hyperparathyroidism    Diabetes mellitus    Diabetic neuropathy    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    Osteoarthritis    Vision loss of left eye    ESRD on hemodialysis    Cataract  both eyes - hx of sx done    BPH (benign prostatic hyperplasia)    UTI (urinary tract infection)  hx of    Bladder mass  hx of    H/O hematuria    Osteoporosis    Vision loss of right eye    Depression    Chronic GERD    Osteomyelitis of vertebra    Below knee amputation status, left  2012- pt is wearing prostesis    History of right cataract extraction    History of left cataract extraction    S/P arteriovenous (AV) fistula creation  right arm brachiocephalic arteriovenous fistula on 11/08/2018    H/O hematuria  s/p bladder bx and fulguration 2/25/2020    H/O transurethral destruction of bladder lesion  2020    History of excision of mass  back mass on 03/31/2021      fish (Rash)  liver (Anaphylaxis)  No Known Drug Allergies    Home Medications Reviewed  Hospital Medications:   MEDICATIONS  (STANDING):  ALBUTerol    90 MICROgram(s) HFA Inhaler 2 Puff(s) Inhalation every 6 hours  amLODIPine   Tablet 10 milliGRAM(s) Oral daily  cholecalciferol 1000 Unit(s) Oral daily  epoetin beverley-epbx (RETACRIT) Injectable 4000 Unit(s) IV Push <User Schedule>  ferrous    sulfate 325 milliGRAM(s) Oral daily  folic acid 1 milliGRAM(s) Oral daily  glucagon  Injectable 1 milliGRAM(s) IntraMuscular once  heparin   Injectable 5000 Unit(s) SubCutaneous every 12 hours  insulin glargine Injectable (LANTUS) 6 Unit(s) SubCutaneous at bedtime  insulin lispro (ADMELOG) corrective regimen sliding scale   SubCutaneous Before meals and at bedtime  metoclopramide 5 milliGRAM(s) Oral three times a day  multivitamin 1 Tablet(s) Oral daily  nortriptyline 10 milliGRAM(s) Oral at bedtime  pantoprazole    Tablet 40 milliGRAM(s) Oral before breakfast  sevelamer carbonate 2400 milliGRAM(s) Oral three times a day with meals  sodium zirconium cyclosilicate 10 Gram(s) Oral daily  sucralfate suspension 1 Gram(s) Oral four times a day    SOCIAL HISTORY:  Denies ETOh,Smoking,   FAMILY HISTORY:  Family history of cirrhosis of liver (Mother)    Family history of renal failure (Sibling)    Family history of hypertension (Sibling)    Family history of diabetes mellitus (Sibling)    History of substance abuse in sibling (Sibling)          VITALS:  T(F): 97.5 (02-22-22 @ 16:17), Max: 99 (02-22-22 @ 14:26)  HR: 98 (02-22-22 @ 16:17)  BP: 176/92 (02-22-22 @ 16:17)  RR: 19 (02-22-22 @ 16:17)  SpO2: 95% (02-22-22 @ 16:17)  Wt(kg): --    02-22 @ 07:01  -  02-22 @ 16:58  --------------------------------------------------------  IN: 600 mL / OUT: 3765 mL / NET: -3165 mL      Height (cm): 167.6 (02-22 @ 00:07)  PHYSICAL EXAM:  Constitutional: NAD  HEENT: anicteric sclera, oropharynx clear, MMM  Neck: No JVD  Respiratory: CTAB, no wheezes, rales or rhonchi  Cardiovascular: S1, S2, RRR  Gastrointestinal: BS+, soft, NT/ND  Extremities:  peripheral edema+  Neurological: A/O x 3, no focal deficits  Vascular Access: AVF with thrill and bruit    LABS:  02-22    138  |  105  |  38<H>  ----------------------------<  125<H>  4.7   |  25  |  10.20<H>    Ca    7.8<L>      22 Feb 2022 11:47    TPro  7.1  /  Alb  3.1<L>  /  TBili  0.5  /  DBili      /  AST  15  /  ALT  13  /  AlkPhos  95  02-22    Creatinine Trend: 10.20 <--, 9.57 <--                        8.0    3.18  )-----------( 95       ( 22 Feb 2022 11:47 )             25.5     Urine Studies:      RADIOLOGY & ADDITIONAL STUDIES:

## 2022-02-22 NOTE — ED ADULT TRIAGE NOTE - CHIEF COMPLAINT QUOTE
difficulty breathing since about 2 hrs ago , oxygen sat 85% room air and was given oxygen  went up to 100% at 12L/NRM

## 2022-02-22 NOTE — ED PROVIDER NOTE - EYES, MLM
CM met with pt for d/c planning assessment.  Pt complaining of hip pain during assessment which he says has been present on reoccurring basis for approximately three months and may be related to arthritis.  The pt lives at home with his father whom he helps to care for.  Pt currently uses a CPAP machine at night to sleep.  He states that he will need a walker or cane for home use to help with mobility, due to hip pain that is sometime so severe he is unable to walk.  Pt states he was set up with outpatient PT and went to his first appointment recently.  He cannot remember name of facility but knows it is on [email or web address], and he wants to resume services if needed.         07/12/17 1449   Discharge Assessment   Assessment Type Discharge Planning Assessment   Confirmed/corrected address and phone number on facesheet? Yes   Assessment information obtained from? Patient;Medical Record   Expected Length of Stay (days) (TBD)   Prior to hospitilization cognitive status: Alert/Oriented   Prior to hospitalization functional status: Assistive Equipment   Current cognitive status: Alert/Oriented   Current Functional Status: Assistive Equipment   Arrived From home or self-care   Lives With parent(s)   Able to Return to Prior Arrangements yes   Is patient able to care for self after discharge? Yes   How many people do you have in your home that can help with your care after discharge? 0   Who are your caregiver(s) and their phone number(s)? Erica Green, relative: [phone]   Patient's perception of discharge disposition home or selfcare   Readmission Within The Last 30 Days no previous admission in last 30 days   Patient currently being followed by outpatient case management? No   Patient currently receives home health services? No   Does the patient currently use HME? Yes   Patient currently receives private duty nursing? No   Patient currently receives any other outside agency services? Yes   Is it the  patient/care giver preference to resume care with the current outside agency? Yes   Equipment Currently Used at Home CPAP   Do you have any problems affording any of your prescribed medications? No   Is the patient taking medications as prescribed? yes   Do you have any financial concerns preventing you from receiving the healthcare you need? No   Does the patient have transportation to healthcare appointments? Yes   Transportation Available Medicaid transportation   On Dialysis? No   Does the patient receive services at the [facility]? No   Are there any open cases? No   Discharge Plan A Home   Discharge Plan B Home   Patient/Family In Agreement With Plan yes      Clear bilaterally, pupils equal, round and reactive to light.

## 2022-02-22 NOTE — ED PROVIDER NOTE - PROGRESS NOTE DETAILS
after admission patient refuses repeat trop and cardiac monitor despite multiple discussions with patient regarding their importance. Malinda SERRANO

## 2022-02-22 NOTE — ED PROVIDER NOTE - CARE PLAN
1 Principal Discharge DX:	Pleural effusion, left  Secondary Diagnosis:	Pulmonary edema  Secondary Diagnosis:	Hypoxia

## 2022-02-22 NOTE — H&P ADULT - PROBLEM SELECTOR PLAN 2
CXR with congestion and possible left lower effusion  Proceed with HD/UF as per nephro to remove excess fluid   taper down o2 as able   F/u echo

## 2022-02-22 NOTE — ED ADULT NURSE NOTE - OBJECTIVE STATEMENT
62 yo male BIBA to the ED with a complaint of difficulty breathing that started 2 hour ago. As per EMS,  patient's oxygen saturation is 85% on room air. Patietn was placed on non-rebreather mask at 12 L/min. and patient's oxygen saturation is 100%. AV fistula noted on right upper arm. Patient denies chest pain, fever,  nausea, and vomiting at this time.

## 2022-02-22 NOTE — H&P ADULT - ASSESSMENT
Patient is a 62 y/o male from [facility] with a significant medical history of HTN, Anemia, CAD, HLD, T2DM, GERD, Adrenal Insuffiencey, Left BKA, prior GI Bleed, and ESRD on HD (TTS) was brought to the ED for complaints of shortness of breath x 1 day. Admitted for Acute Hypoxic Respiratory Failure 2/2 to flash pulmonary edema due to hypertensive emergency vs new CHF vs fluid overload from ESRD.    In the ED,  VS: 97F, HR 103bpm, /98mmHg, RR 18, Spo2 100 on 12L NRB >> 40L Venturi   Labs significant for Hb 9.8, plt 109, BUN/ Sr Cr 32/9.57, Trop 86.1, BNP 81,328  ABG 7.27/62/26/29 (baseline c02 40-50)  CXR with signs of fluid overload and worsening left sided effusion

## 2022-02-22 NOTE — H&P ADULT - PROBLEM SELECTOR PLAN 4
Baseline 9-11, at baseline   Likely multifactorial including ESRD, ACD, Iron deficiency   C/w home medications   No active bleeding   Coags wnl

## 2022-02-22 NOTE — ED ADULT NURSE REASSESSMENT NOTE - NS ED NURSE REASSESS COMMENT FT1
Pt received from MICHI Carter. Pt aox4, refused morning vitals, refused tele monitor, refused blood work. MD Adams and Manager Jessica frey .

## 2022-02-22 NOTE — H&P ADULT - NSHPPHYSICALEXAM_GEN_ALL_CORE
GENERAL APPEARANCE: Well developed, AAM, AA0x3, in NAD while on Venti Mask   HEENT: Normocephalic, PERRL, EOMI External auditory canals clear, hearing grossly intact, no nasal discharge   THROAT: Oral cavity and pharynx normal. No inflammation, swelling, exudate, or lesions.  CARDIAC: Normal S1 and S2. No S3, S4 or murmurs. Rhythm is regular. There is 1+ peripheral edema, cyanosis or pallor.  LUNGS: Clear to auscultation and percussion without rales, rhonchi, wheezing or diminished breath sounds.  ABDOMEN: Positive bowel sounds. Soft, nondistended, nontender. No guarding or rebound. No masses.  EXTREMITIES: L BKA, 1+ edema on R leg. Peripheral pulses intact. No varicosities.  NEUROLOGICAL: Unable to assess  SKIN: No skin breakdown, lesions or rashes noted

## 2022-02-22 NOTE — ED ADULT NURSE REASSESSMENT NOTE - NS ED NURSE REASSESS COMMENT FT1
Patient refused to get second troponin drawn. Charge Nurse attempted to draw second troponin, patient refused. Dr. Adams informed.

## 2022-02-23 ENCOUNTER — TRANSCRIPTION ENCOUNTER (OUTPATIENT)
Age: 62
End: 2022-02-23

## 2022-02-23 DIAGNOSIS — Z02.9 ENCOUNTER FOR ADMINISTRATIVE EXAMINATIONS, UNSPECIFIED: ICD-10-CM

## 2022-02-23 LAB
GLUCOSE BLDC GLUCOMTR-MCNC: 111 MG/DL — HIGH (ref 70–99)
GLUCOSE BLDC GLUCOMTR-MCNC: 130 MG/DL — HIGH (ref 70–99)
GLUCOSE BLDC GLUCOMTR-MCNC: 268 MG/DL — HIGH (ref 70–99)

## 2022-02-23 PROCEDURE — 71045 X-RAY EXAM CHEST 1 VIEW: CPT | Mod: 26

## 2022-02-23 RX ORDER — CARVEDILOL PHOSPHATE 80 MG/1
3.12 CAPSULE, EXTENDED RELEASE ORAL EVERY 12 HOURS
Refills: 0 | Status: DISCONTINUED | OUTPATIENT
Start: 2022-02-23 | End: 2022-02-24

## 2022-02-23 RX ORDER — ATORVASTATIN CALCIUM 80 MG/1
20 TABLET, FILM COATED ORAL AT BEDTIME
Refills: 0 | Status: DISCONTINUED | OUTPATIENT
Start: 2022-02-23 | End: 2022-02-24

## 2022-02-23 RX ORDER — SENNA PLUS 8.6 MG/1
2 TABLET ORAL AT BEDTIME
Refills: 0 | Status: DISCONTINUED | OUTPATIENT
Start: 2022-02-23 | End: 2022-02-24

## 2022-02-23 RX ADMIN — ZOLPIDEM TARTRATE 5 MILLIGRAM(S): 10 TABLET ORAL at 22:40

## 2022-02-23 RX ADMIN — Medication 650 MILLIGRAM(S): at 22:41

## 2022-02-23 RX ADMIN — Medication 1 MILLIGRAM(S): at 13:00

## 2022-02-23 RX ADMIN — NORTRIPTYLINE HYDROCHLORIDE 10 MILLIGRAM(S): 10 CAPSULE ORAL at 00:58

## 2022-02-23 RX ADMIN — ATORVASTATIN CALCIUM 20 MILLIGRAM(S): 80 TABLET, FILM COATED ORAL at 21:22

## 2022-02-23 RX ADMIN — Medication 1 TABLET(S): at 13:00

## 2022-02-23 RX ADMIN — SEVELAMER CARBONATE 2400 MILLIGRAM(S): 2400 POWDER, FOR SUSPENSION ORAL at 09:36

## 2022-02-23 RX ADMIN — Medication 1 GRAM(S): at 13:00

## 2022-02-23 RX ADMIN — Medication 650 MILLIGRAM(S): at 23:11

## 2022-02-23 RX ADMIN — SEVELAMER CARBONATE 2400 MILLIGRAM(S): 2400 POWDER, FOR SUSPENSION ORAL at 13:00

## 2022-02-23 RX ADMIN — Medication 3 MILLIGRAM(S): at 00:57

## 2022-02-23 RX ADMIN — NORTRIPTYLINE HYDROCHLORIDE 10 MILLIGRAM(S): 10 CAPSULE ORAL at 22:41

## 2022-02-23 RX ADMIN — SEVELAMER CARBONATE 2400 MILLIGRAM(S): 2400 POWDER, FOR SUSPENSION ORAL at 17:50

## 2022-02-23 RX ADMIN — Medication 1 GRAM(S): at 01:04

## 2022-02-23 RX ADMIN — Medication 1000 UNIT(S): at 13:00

## 2022-02-23 RX ADMIN — Medication 25 MILLIGRAM(S): at 00:57

## 2022-02-23 RX ADMIN — Medication 1 GRAM(S): at 17:51

## 2022-02-23 RX ADMIN — CARVEDILOL PHOSPHATE 3.12 MILLIGRAM(S): 80 CAPSULE, EXTENDED RELEASE ORAL at 17:50

## 2022-02-23 RX ADMIN — Medication 325 MILLIGRAM(S): at 13:00

## 2022-02-23 RX ADMIN — Medication 5 MILLIGRAM(S): at 22:41

## 2022-02-23 RX ADMIN — Medication 25 MILLIGRAM(S): at 21:22

## 2022-02-23 NOTE — PROGRESS NOTE ADULT - SUBJECTIVE AND OBJECTIVE BOX
Patient is a 61y old  Male who presents with a chief complaint of Shortness of Breath (23 Feb 2022 18:15)    PATIENT IS SEEN AND EXAMINED IN MEDICAL FLOOR.  MARIIT [    ]    JEREMY [   ]      GT [   ]    ALLERGIES:  fish (Rash)  liver (Anaphylaxis)  No Known Drug Allergies      Daily     Daily     VITALS:    Vital Signs Last 24 Hrs  T(C): 36.9 (23 Feb 2022 15:30), Max: 36.9 (23 Feb 2022 15:30)  T(F): 98.5 (23 Feb 2022 15:30), Max: 98.5 (23 Feb 2022 15:30)  HR: 95 (23 Feb 2022 15:30) (66 - 110)  BP: 167/89 (23 Feb 2022 15:30) (155/79 - 205/113)  BP(mean): 144 (22 Feb 2022 22:45) (144 - 144)  RR: 17 (23 Feb 2022 15:30) (17 - 18)  SpO2: 95% (23 Feb 2022 15:30) (87% - 100%)    LABS:    CBC Full  -  ( 22 Feb 2022 11:47 )  WBC Count : 3.18 K/uL  RBC Count : 2.75 M/uL  Hemoglobin : 8.0 g/dL  Hematocrit : 25.5 %  Platelet Count - Automated : 95 K/uL  Mean Cell Volume : 92.7 fl  Mean Cell Hemoglobin : 29.1 pg  Mean Cell Hemoglobin Concentration : 31.4 gm/dL  Auto Neutrophil # : x  Auto Lymphocyte # : x  Auto Monocyte # : x  Auto Eosinophil # : x  Auto Basophil # : x  Auto Neutrophil % : x  Auto Lymphocyte % : x  Auto Monocyte % : x  Auto Eosinophil % : x  Auto Basophil % : x    PT/INR - ( 22 Feb 2022 00:58 )   PT: 11.0 sec;   INR: 0.92 ratio         PTT - ( 22 Feb 2022 00:58 )  PTT:33.1 sec  02-22    138  |  105  |  38<H>  ----------------------------<  125<H>  4.7   |  25  |  10.20<H>    Ca    7.8<L>      22 Feb 2022 11:47    TPro  7.1  /  Alb  3.1<L>  /  TBili  0.5  /  DBili  x   /  AST  15  /  ALT  13  /  AlkPhos  95  02-22    CAPILLARY BLOOD GLUCOSE      POCT Blood Glucose.: 268 mg/dL (23 Feb 2022 11:28)  POCT Blood Glucose.: 111 mg/dL (23 Feb 2022 07:47)  POCT Blood Glucose.: 130 mg/dL (23 Feb 2022 05:59)  POCT Blood Glucose.: 82 mg/dL (22 Feb 2022 22:31)  POCT Blood Glucose.: 56 mg/dL (22 Feb 2022 22:04)    CARDIAC MARKERS ( 22 Feb 2022 11:47 )  x     / x     / 105 U/L / x     / 5.7 ng/mL      LIVER FUNCTIONS - ( 22 Feb 2022 00:58 )  Alb: 3.1 g/dL / Pro: 7.1 g/dL / ALK PHOS: 95 U/L / ALT: 13 U/L DA / AST: 15 U/L / GGT: x           Creatinine Trend: 10.20<--, 9.57<--  I&O's Summary    22 Feb 2022 07:01  -  23 Feb 2022 07:00  --------------------------------------------------------  IN: 600 mL / OUT: 3765 mL / NET: -3165 mL            .Blood Blood-Peripheral  02-22 @ 03:45   No growth to date.  --  --      .Blood Blood-Peripheral  12-23 @ 20:58   No Growth Final  --  --      .Blood Blood-Peripheral  12-23 @ 20:56   No Growth Final  --  --          MEDICATIONS:    MEDICATIONS  (STANDING):  ALBUTerol    90 MICROgram(s) HFA Inhaler 2 Puff(s) Inhalation every 6 hours  amLODIPine   Tablet 10 milliGRAM(s) Oral daily  atorvastatin 20 milliGRAM(s) Oral at bedtime  carvedilol 3.125 milliGRAM(s) Oral every 12 hours  cholecalciferol 1000 Unit(s) Oral daily  epoetin beverley-epbx (RETACRIT) Injectable 4000 Unit(s) IV Push <User Schedule>  ferrous    sulfate 325 milliGRAM(s) Oral daily  folic acid 1 milliGRAM(s) Oral daily  glucagon  Injectable 1 milliGRAM(s) IntraMuscular once  heparin   Injectable 5000 Unit(s) SubCutaneous every 12 hours  hydrALAZINE 25 milliGRAM(s) Oral three times a day  metoclopramide 5 milliGRAM(s) Oral three times a day  multivitamin 1 Tablet(s) Oral daily  nortriptyline 10 milliGRAM(s) Oral at bedtime  pantoprazole    Tablet 40 milliGRAM(s) Oral before breakfast  sevelamer carbonate 2400 milliGRAM(s) Oral three times a day with meals  sodium zirconium cyclosilicate 10 Gram(s) Oral daily  sucralfate suspension 1 Gram(s) Oral four times a day      MEDICATIONS  (PRN):  acetaminophen     Tablet .. 650 milliGRAM(s) Oral every 6 hours PRN Temp greater or equal to 38C (100.4F), Mild Pain (1 - 3)  melatonin 3 milliGRAM(s) Oral at bedtime PRN Insomnia  ondansetron Injectable 4 milliGRAM(s) IV Push every 8 hours PRN Nausea and/or Vomiting  zolpidem 5 milliGRAM(s) Oral at bedtime PRN Insomnia      REVIEW OF SYSTEMS:                           ALL ROS DONE [ X   ]    CONSTITUTIONAL:  LETHARGIC [   ], FEVER [   ], UNRESPONSIVE [   ]  CVS:  CP  [   ], SOB, [   ], PALPITATIONS [   ], DIZZYNESS [   ]  RS: COUGH [   ], SPUTUM [   ]  GI: ABDOMINAL PAIN [   ], NAUSEA [   ], VOMITINGS [   ], DIARRHEA [   ], CONSTIPATION [   ]  :  DYSURIA [   ], NOCTURIA [   ], INCREASED FREQUENCY [   ], DRIBLING [   ],  SKELETAL: PAINFUL JOINTS [   ], SWOLLEN JOINTS [   ], NECK ACHE [   ], LOW BACK ACHE [   ],  SKIN : ULCERS [   ], RASH [   ], ITCHING [   ]  CNS: HEAD ACHE [   ], DOUBLE VISION [   ], BLURRED VISION [   ], AMS / CONFUSION [   ], SEIZURES [   ], WEAKNESS [   ],TINGLING / NUMBNESS [   ]    PHYSICAL EXAMINATION:  GENERAL APPEARANCE: NO DISTRESS  HEENT:  NO PALLOR, NO  JVD,  NO   NODES, NECK SUPPLE  CVS: S1 +, S2 +,   RS: AEEB,  OCCASIONAL  RALES +,   NO RONCHI  ABD: SOFT, NT, NO, BS +  EXT: NO PE   , LEFT BKA +  SKIN: WARM, RIGHT UPPER EXTREMITY AF +  SKELETAL:  ROM ACCEPTABLE  CNS:  AAO X 3,  VISUALLY IMPAIRED +    RADIOLOGY :    < from: CT Abdomen and Pelvis No Cont (12.26.21 @ 14:07) >  IMPRESSION:  *  Mild pulmonary edema and trace bilateral pleural effusions.  *  Moderate cardiomegaly and trace pericardial effusion.  *  Thickening and patulous esophagus again noted as before.        < end of copied text >  < from: CT Chest No Cont (12.26.21 @ 14:07) >  IMPRESSION:  *  Mild pulmonary edema and trace bilateral pleural effusions.  *  Moderate cardiomegaly and trace pericardial effusion.  *  Thickening and patulous esophagus again noted as before.      < end of copied text >        ASSESSMENT :     Hematemesis      Hypertension    Adrenal insufficiency    CKD (chronic kidney disease)    Anemia    Glaucoma    Coronary artery disease    HLD (hyperlipidemia)    Peripheral vascular disease    Spinal stenosis of lumbosacral region    Hyperparathyroidism    Diabetes mellitus    Diabetic neuropathy    Contracture of hand    Osteoarthritis    Osteoporosis    Vision loss of left eye    ESRD on hemodialysis    Cataract    BPH (benign prostatic hyperplasia)    UTI (urinary tract infection)    Bladder mass    H/O hematuria    Osteoporosis    Vision loss of right eye    Depression    Chronic GERD    Osteomyelitis of vertebra    Below knee amputation status, left    History of right cataract extraction    History of left cataract extraction    S/P arteriovenous (AV) fistula creation    H/O hematuria    H/O transurethral destruction of bladder lesion    History of excision of mass        PLAN:  HPI:  Patient is a 62 y/o male from [facility] with a past medical history of hypertension, anemia, CAD, hyperlipidemia, diabetes mellitus, GERD and adrenal insuffiencey, ESRD on HD T,Th,S reports to the ED for vomiting blood. Patient noted to  have one episode of dark vomiting and complains of nausea. Patient denies any abdominal pain, dysphagia, hematochezia or melena. Patient denies any changes to appetite or weight. Patient denies any prior upper endoscopy or prior episodes. Patient also missed dialysis yesterday. Patient, however, states he had dialysis yesterday and is scheduled for the next dialysis appointment on Saturday. Patient's dialysis schedule is T, Th, S. Patient denies any cp, sob or palpitations. Denies lower extremity swelling. Denies fevers or chills, sob or cough.    (14 Jan 2022 16:07)      - COUNSELLED PATIENT AT LENGTH ON THE IMPORTANCE OF EVALUATION, MEDICATION COMPLIANCE, BLOOD WORK, IV ACCESS, COMPLETE HD SESSIONS, EATING MEALS REGULARLY [GIVEN DM]. PATIENT VERBALIZED UNDERSTANDING REGARDING RISKS INCLUDE BUT ARE NOT LIMITED TO ARRYTHMIA, CVA, MI AND DEATH.     # ACUTE HYPOXIC RESPIRATORY FAILURE, ACUTE ON CHRONIC PULMONARY EDEMA, HYPERTENSIVE URGENCY   - SUPPLEMENTAL OXYGEN -- WAS TOLERATING ROOM AIR PRIOR TO AND POST-HD  - PLACED ON AMLODIPINE, HYDRALAZINE; GIVEN ONE DOSE OF METOPROLOL   - CARDIOLOGY CONSULT IN PROGRESS  - NEPHROLOGY CONSULT    - ECHO - MILD MR, LAE, MILD CONCENTRIC LVH, NORMAL LVEF, NORMAL DIASTOLIC FUNCTION  -  DESPITE EXTENSIVE COUNSELLING PATIENT REFUSED TELEMETRY    - PATIENT IMPROVED AFTER HD     - PLANNED FOR NST TOMORROW    # UNDERLYING DM W/ HYPOGLYCEMIA - PATIENT IS REFUSING TO EAT/DRINK - FINGERSTICKS AND ENCOURAGING PO INTAKE, D50 PUSH + D5W IF BLOOD SUGAR DOES NOT IMPROVE  - HOLD INSULIN    # RECURRENT EMESIS W/ HISTORY OF ESOPHAGITIS AND DUODENITIS [1/2021], HX OF GASTROPARESIS   # SEVERE PROTEIN CALORIE MALNUTRITION, FAILURE TO THRIVE - NUTRITIONAL SUPPLEMENT  # ANEMIA OF CKD  # HLD  # ESRD ON HD TTS - W/ HX OF NONCOMPLIANCE - NEPHROLOGY CONSULT IN PROGRESS  # HTN  # DM   # PARTIALLY BLIND  # S/P LEFT BKA  # HX OF PVD  # LS SPINAL STENOSIS  # GI AND DVT PPX

## 2022-02-23 NOTE — DISCHARGE NOTE PROVIDER - NSDCCAREPROVSEEN_GEN_ALL_CORE_FT
"        Wagner Kenney   2017 2:30 PM   Office Visit   MRN: [id number]    [facility]   Dept Phone:  [phone]    Description:  Male : 1966   Provider:  Eleuterio Lara M.D.           Reason for Visit     Diabetes dm at home 158    Medication Management           Allergies as of 2017     Allergen Noted Reactions    Apple 2013   Swelling    Per patient: swelling of mouth and jittery feeling.  Apple allergy to raw apples; apple juice and applesauce okay per patient    Asa [Aspirin] 2009       \"funny taste in my mouth. My stomach has an 'empty' feeling.\"  \"throat closing\"    Metformin 2010       Pt states he \"cramps up\"      You were diagnosed with     Corns/callosities   [616657]       Uncontrolled type 1 diabetes mellitus with stage 3 chronic kidney disease (CMS-HCC)   [900080]       Essential hypertension   [0144465]       Chronic kidney disease (CKD), stage III (moderate)   [962714]       Hyperlipidemia with target LDL less than 100   [804289]       Neuropathy associated with endocrine disorder (CMS-HCC)   [680301]       Type 2 diabetes mellitus with other diabetic neurological complication (CMS-HCC)   [5988654]       Pulmonary emphysema, unspecified emphysema type (CMS-HCC)   [4032992]         Vital Signs     Blood Pressure Pulse Temperature Respirations Height Weight    120/78 mmHg 88 36.8 °C (98.2 °F) 6 1.956 m (6' 5.01\") 101.606 kg (224 lb)    Body Mass Index Oxygen Saturation Smoking Status             26.56 kg/m2 97% Current Every Day Smoker         Basic Information     Date Of Birth Sex Race Ethnicity Preferred Language    1966 Male Black or  Non- English      Your appointments     2017  1:30 PM   New Patient with Won Atwood M.D.   [facility] (--)    [address]  [facility] [address] [phone]           Please bring Photo ID, Insurance Cards, All Medication Bottles and copies of " any legal documents (such as Living Will, Power of ) If speaking a language besides English please bring an adult . Please arrive 30 minutes prior for check in and registration. You will be receiving a confirmation call a few days before your appointment from our automated call confirmation system.            May 11, 2017  2:10 PM   Established Patient with Eleuterio Lara M.D.   [facility] ([facility])    [address] [place] [address]   [phone]           You will be receiving a confirmation call a few days before your appointment from our automated call confirmation system.              Problem List              ICD-10-CM Priority Class Noted - Resolved    GERD (gastroesophageal reflux disease) K21.9   7/15/2009 - Present    HTN (hypertension) I10 Medium  7/15/2009 - Present    Chronic low back pain M54.5, G89.29   7/15/2009 - Present    COPD (chronic obstructive pulmonary disease) (CMS-HCC) J44.9   3/31/2010 - Present    ANABELA (obstructive sleep apnea) G47.33   4/14/2010 - Present    Right-sided heart failure (CMS-HCC) I50.9   5/17/2010 - Present    Hand pain M79.643   8/30/2010 - Present    Foot pain M79.673   8/30/2010 - Present    Diabetes    4/18/2011 - Present    DIABETES MELLITUS    11/8/2011 - Present    CKD (chronic kidney disease) N18.9   1/13/2012 - Present    HTN (hypertension) I10   1/13/2012 - Present    Hyperlipidemia with target LDL less than 100 E78.5 Low  1/13/2012 - Present    DM (diabetes mellitus) (CMS-HCC) E11.9   7/25/2012 - Present    Chronic kidney disease (CKD), stage III (moderate) N18.3 Medium  7/26/2012 - Present    Renal cyst N28.1   7/26/2012 - Present    Asthma exacerbation J45.901 High  10/16/2012 - Present    DIABETES MELLITUS    10/17/2012 - Present    COPD exacerbation (CMS-HCC) J44.1 Medium  4/4/2013 - Present    Hypertriglyceridemia E78.1   6/13/2013 - Present    Hypercalcemia E83.52   7/30/2013 - Present    Vitamin d deficiency     7/30/2013 - Present    Back pain M54.9   7/17/2015 - Present    DKA (diabetic ketoacidoses) (CMS-HCC) E13.10   7/17/2015 - Present    Tobacco abuse Z72.0 Low  7/17/2015 - Present    Increased anion gap metabolic acidosis E87.2   7/17/2015 - Present    Sepsis (CMS-HCC) A41.9 High  7/22/2015 - Present    CAP (community acquired pneumonia) J18.9 High  7/22/2015 - Present    DM (diabetes mellitus) (CMS-HCC) E11.9 Medium  7/23/2015 - Present    Cocaine use F14.10   9/30/2015 - Present    Hyperlipidemia associated with type 2 diabetes mellitus (CMS-HCC) E11.69, E78.5   10/6/2015 - Present    Type 2 diabetes mellitus with hyperglycemia (CMS-HCC) E11.65 Medium  2/13/2016 - Present    Type 2 diabetes mellitus with neurologic complication (CMS-HCC) E11.49 Medium  2/13/2016 - Present    Acute on chronic respiratory failure with hypoxia (CMS-HCC) J96.21 High  2/13/2016 - Present    COPD with acute exacerbation (CMS-HCC) J44.1 High  2/13/2016 - Present    Viral sepsis (CMS-HCC) A41.89 High  2/13/2016 - Present    Influenza A J10.1 High  2/13/2016 - Present    Osteoarthritis of spine with radiculopathy, cervical region M47.22   9/9/2016 - Present    DDD (degenerative disc disease), cervical M50.30   9/9/2016 - Present    Cervical radiculopathy M54.12   9/9/2016 - Present    Chronic neck pain M54.2, G89.29   9/9/2016 - Present      Health Maintenance        Date Due Completion Dates    IMM HEP B VACCINE (1 of 3 - Primary Series) 1966 ---    IMM DTaP/Tdap/Td Vaccine (1 - Tdap) 3/18/1986 ---    IMM PNEUMOCOCCAL 19-64 (ADULT) MEDIUM RISK SERIES (1 of 1 - PPSV23) 3/18/1986 ---    DIABETES MONOFILAMENT / LE EXAM 3/31/2016 3/31/2015 (Done), 10/14/2013 (N/S)    Override on 3/31/2015: Done    Override on 10/14/2013: (N/S)    COLONOSCOPY 3/18/2017 ---    RETINAL SCREENING 4/14/2017 4/14/2016, 4/4/2016 (Done), 3/31/2015 (Done), 10/28/2013 (Done)    Override on 4/4/2016: Done    Override on 3/31/2015: Done (no retinopathy)    Override  on 10/28/2013: Done    A1C SCREENING 5/7/2017 11/7/2016, 5/25/2016, 2/11/2016, 12/2/2015, 7/17/2015, 2/21/2014, 7/24/2013, 4/4/2013, 10/17/2012, 7/25/2012, 8/3/2011, 10/28/2010, 6/28/2010, 4/20/2010, 1/5/2009, 6/30/2008, 12/10/2006, 10/19/2005    FASTING LIPID PROFILE 11/7/2017 11/7/2016, 5/25/2016, 2/12/2016, 12/2/2015, 2/21/2014, 7/24/2013, 7/25/2012, 8/3/2011, 4/20/2010, 6/9/2009, 6/30/2008    URINE ACR / MICROALBUMIN 11/7/2017 11/7/2016, 5/25/2016, 12/2/2015, 7/24/2013, 7/25/2012, 10/28/2010    SERUM CREATININE 3/12/2018 3/12/2017, 12/9/2016, 11/7/2016, 9/6/2016, 5/25/2016, 2/13/2016, 2/12/2016, 2/11/2016, 12/2/2015, 9/5/2015, 7/26/2015, 7/25/2015, 7/24/2015, 7/22/2015, 7/18/2015, 7/17/2015, 7/17/2015, 2/22/2015, 1/6/2015, 7/15/2014, 7/14/2014, 7/13/2014, 5/3/2014, 11/4/2013, 7/24/2013, 5/27/2013, 4/4/2013, 1/29/2013, 10/18/2012, 10/17/2012, 10/16/2012, 8/21/2012, 7/25/2012, 8/3/2011, 12/22/2010, 4/20/2010, 6/9/2009, 9/29/2008, 6/30/2008, 12/10/2006, 12/9/2006, 10/27/2005, 10/26/2005, 10/25/2005, 10/25/2005, 10/25/2005, 10/18/2005, 10/18/2005            Current Immunizations     Influenza TIV (IM) 10/14/2013, 10/18/2012 10:41 AM, 11/8/2011 10:06 AM    Influenza Vaccine Pediatric 11/9/2010, 12/16/2009    Influenza Vaccine Quad Inj (Pf) 10/22/2014  9:18 AM    Influenza Vaccine Quad Inj (Preserved) 2/13/2016 12:36 PM    Pneumococcal Vaccine (UF)Historical Data 12/23/2010      Below and/or attached are the medications your provider expects you to take. Review all of your home medications and newly ordered medications with your provider and/or pharmacist. Follow medication instructions as directed by your provider and/or pharmacist. Please keep your medication list with you and share with your provider. Update the information when medications are discontinued, doses are changed, or new medications (including over-the-counter products) are added; and carry medication information at all times in the event of emergency  situations     Allergies:  APPLE - Swelling     ASA - (reactions not documented)     METFORMIN - (reactions not documented)               Medications  Valid as of: April 05, 2017 -  3:07 PM    Generic Name Brand Name Tablet Size Instructions for use    Albuterol Sulfate (Aero Soln) albuterol 108 (90 BASE) MCG/ACT Inhale 2 Puffs by mouth every 6 hours as needed for Shortness of Breath.        AmLODIPine Besylate (Tab) NORVASC 10 MG Take 1 Tab by mouth every day.        Atorvastatin Calcium (Tab) LIPITOR 20 MG Take 1 Tab by mouth every day.        Budesonide-Formoterol Fumarate (Aerosol) SYMBICORT 160-4.5 MCG/ACT INHALE 2 PUFFS BY MOUTH 2 TIMES A DAY.        Cyclobenzaprine HCl (Tab) FLEXERIL 10 MG Take 1 Tab by mouth 2 times a day as needed for Mild Pain.        Insulin Aspart (Solution Pen-injector) NOVOLOG 100 UNIT/ML Inject 5-15 Units as instructed 3 times a day before meals.        Insulin Glargine (Solution) LANTUS 100 UNIT/ML Inject 42 Units as instructed every day.        Lisinopril (Tab) PRINIVIL 10 MG Take 1 Tab by mouth every day.        RaNITidine HCl (Tab) ZANTAC 150 MG Take 1 Tab by mouth 2 times a day as needed for Heartburn.        Tiotropium Bromide Monohydrate (Cap) SPIRIVA 18 MCG Inhale 1 Cap by mouth 1 time daily as needed (Shortness of breath).        .                 Medicines prescribed today were sent to:     [facility]Y #[id number] - [place] - [address]    [address] [address]    Phone: [phone] Fax: [phone]    Open 24 Hours?: No    [facility]/PHARMACY #[id number] - [place] - [address]    [address] [address]    Phone: [phone] Fax: [phone]    Open 24 Hours?: No    [facility] - [place] - [address]    [address] [place] [address]    Phone: [phone] Fax: [phone]    Open 24 Hours?: No      Medication refill instructions:       If your prescription bottle indicates you have medication refills left, it is not necessary to  call your provider’s office. Please contact your pharmacy and they will refill your medication.    If your prescription bottle indicates you do not have any refills left, you may request refills at any time through one of the following ways: The online [employer] system (except Urgent Care), by calling your provider’s office, or by asking your pharmacy to contact your provider’s office with a refill request. Medication refills are processed only during regular business hours and may not be available until the next business day. Your provider may request additional information or to have a follow-up visit with you prior to refilling your medication.   *Please Note: Medication refills are assigned a new Rx number when refilled electronically. Your pharmacy may indicate that no refills were authorized even though a new prescription for the same medication is available at the pharmacy. Please request the medicine by name with the pharmacy before contacting your provider for a refill.        Your To Do List     Future Labs/Procedures Complete By Expires    CBC WITH DIFFERENTIAL  As directed 4/5/2018    COMP METABOLIC PANEL  As directed 4/5/2018    HEMOGLOBIN A1C  As directed 4/5/2018    LIPID PROFILE  As directed 4/5/2018    MICROALBUMIN CREAT RATIO URINE  As directed 4/5/2018      Referral     A referral request has been sent to our patient care coordination department. Please allow 3-5 business days for us to process this request and contact you either by phone or mail. If you do not hear from us by the 5th business day, please call us at [phone].           [employer] Status: Patient Declined        Quit Tobacco Information     Do you want to quit using tobacco?    Quitting tobacco decreases risks of cancer, heart and lung disease, increases life expectancy, improves sense of taste and smell, and increases spending money, among other benefits.    If you are thinking about quitting, we can help.  • [facility] Quit Tobacco  Program: 695.180.2161  o Program occurs weekly for four weeks and includes pharmacist consultation on products to support quitting smoking or chewing tobacco. A provider referral is needed for pharmacist consultation.  • Tobacco Users Help Hotline: 6-800-QUIT-NOW (468-8085) or https://[place].quitlogix.org/  o Free, confidential telephone and online coaching for [place] residents. Sessions are designed on a schedule that is convenient for you. Eligible clients receive free nicotine replacement therapy.  • Nationally: www.smokefree.gov  o Information and professional assistance to support both immediate and long-term needs as you become, and remain, a non-smoker. Smokefree.gov allows you to choose the help that best fits your needs.         Felicia De La Torre

## 2022-02-23 NOTE — PROGRESS NOTE ADULT - PROBLEM SELECTOR PLAN 8
discharge planning to [facility] tomorrow 2/24    nuclear stress test in am followed by KENNEDI LAGUNA consulted

## 2022-02-23 NOTE — PROGRESS NOTE ADULT - SUBJECTIVE AND OBJECTIVE BOX
C A R D I O L O G Y  **********************************     DATE OF SERVICE: 02-23-22    Patient denies chest pain or shortness of breath.   Review of systems otherwise (-)  	  MEDICATIONS:  MEDICATIONS  (STANDING):  ALBUTerol    90 MICROgram(s) HFA Inhaler 2 Puff(s) Inhalation every 6 hours  amLODIPine   Tablet 10 milliGRAM(s) Oral daily  cholecalciferol 1000 Unit(s) Oral daily  epoetin beverley-epbx (RETACRIT) Injectable 4000 Unit(s) IV Push <User Schedule>  ferrous    sulfate 325 milliGRAM(s) Oral daily  folic acid 1 milliGRAM(s) Oral daily  glucagon  Injectable 1 milliGRAM(s) IntraMuscular once  heparin   Injectable 5000 Unit(s) SubCutaneous every 12 hours  hydrALAZINE 25 milliGRAM(s) Oral three times a day  metoclopramide 5 milliGRAM(s) Oral three times a day  multivitamin 1 Tablet(s) Oral daily  nortriptyline 10 milliGRAM(s) Oral at bedtime  pantoprazole    Tablet 40 milliGRAM(s) Oral before breakfast  sevelamer carbonate 2400 milliGRAM(s) Oral three times a day with meals  sodium zirconium cyclosilicate 10 Gram(s) Oral daily  sucralfate suspension 1 Gram(s) Oral four times a day      LABS:	 	    CARDIAC MARKERS:  CARDIAC MARKERS ( 22 Feb 2022 11:47 )  x     / x     / 105 U/L / x     / 5.7 ng/mL        Troponin I, High Sensitivity Result: 108.0 ng/L (02-22-22 @ 11:47)  Troponin I, High Sensitivity Result: 86.1 ng/L (02-22-22 @ 00:58)                              8.0    3.18  )-----------( 95       ( 22 Feb 2022 11:47 )             25.5     Hemoglobin: 8.0 g/dL (02-22 @ 11:47)  Hemoglobin: 9.8 g/dL (02-22 @ 00:58)      02-22    138  |  105  |  38<H>  ----------------------------<  125<H>  4.7   |  25  |  10.20<H>    Ca    7.8<L>      22 Feb 2022 11:47    TPro  7.1  /  Alb  3.1<L>  /  TBili  0.5  /  DBili  x   /  AST  15  /  ALT  13  /  AlkPhos  95  02-22    Creatinine Trend: 10.20<--, 9.57<--      PHYSICAL EXAM:  T(C): 36.6 (02-23-22 @ 07:20), Max: 37.2 (02-22-22 @ 14:26)  HR: 79 (02-23-22 @ 07:20) (66 - 110)  BP: 158/84 (02-23-22 @ 07:20) (154/88 - 205/113)  RR: 18 (02-23-22 @ 07:20) (16 - 20)  SpO2: 94% (02-23-22 @ 07:20) (87% - 100%)  Wt(kg): --  I&O's Summary    22 Feb 2022 07:01  -  23 Feb 2022 07:00  --------------------------------------------------------  IN: 600 mL / OUT: 3765 mL / NET: -3165 mL      HEENT:  (-)icterus (-)pallor  CV: N S1 S2 1/6 DIANA (+)2 Pulses B/l  Resp:  Clear to ausculatation B/L, normal effort  GI: (+) BS Soft, NT, ND  Lymph:  (-)Edema, (-)obvious lymphadenopathy  Skin: Warm to touch, Normal turgor  Psych: Appropriate mood and affect          ASSESSMENT/PLAN: 	61y  Male  [facility] with a significant medical history of HTN, Anemia, CAD, HLD, T2DM, GERD, Adrenal Insuffiencey, Left BKA, prior GI Bleed, and ESRD on HD (TTS) was brought to the ED for complaints of shortness of breath x 1 day.    - borderline elevated trop in the setting opr renal disease of unclear clinical significance.  Presentation is inconsitent with ACS  - prelim echo with preserved LV function  - Plan for stress in AM  - HD per renal      Dominic Still MD, [facility]  BEEPER [phone]     C A R D I O L O G Y  **********************************     DATE OF SERVICE: 02-23-22    Patient denies chest pain or shortness of breath.   Review of systems otherwise (-)  	  MEDICATIONS:  MEDICATIONS  (STANDING):  ALBUTerol    90 MICROgram(s) HFA Inhaler 2 Puff(s) Inhalation every 6 hours  amLODIPine   Tablet 10 milliGRAM(s) Oral daily  cholecalciferol 1000 Unit(s) Oral daily  epoetin beverley-epbx (RETACRIT) Injectable 4000 Unit(s) IV Push <User Schedule>  ferrous    sulfate 325 milliGRAM(s) Oral daily  folic acid 1 milliGRAM(s) Oral daily  glucagon  Injectable 1 milliGRAM(s) IntraMuscular once  heparin   Injectable 5000 Unit(s) SubCutaneous every 12 hours  hydrALAZINE 25 milliGRAM(s) Oral three times a day  metoclopramide 5 milliGRAM(s) Oral three times a day  multivitamin 1 Tablet(s) Oral daily  nortriptyline 10 milliGRAM(s) Oral at bedtime  pantoprazole    Tablet 40 milliGRAM(s) Oral before breakfast  sevelamer carbonate 2400 milliGRAM(s) Oral three times a day with meals  sodium zirconium cyclosilicate 10 Gram(s) Oral daily  sucralfate suspension 1 Gram(s) Oral four times a day      LABS:	 	    CARDIAC MARKERS:  CARDIAC MARKERS ( 22 Feb 2022 11:47 )  x     / x     / 105 U/L / x     / 5.7 ng/mL        Troponin I, High Sensitivity Result: 108.0 ng/L (02-22-22 @ 11:47)  Troponin I, High Sensitivity Result: 86.1 ng/L (02-22-22 @ 00:58)                              8.0    3.18  )-----------( 95       ( 22 Feb 2022 11:47 )             25.5     Hemoglobin: 8.0 g/dL (02-22 @ 11:47)  Hemoglobin: 9.8 g/dL (02-22 @ 00:58)      02-22    138  |  105  |  38<H>  ----------------------------<  125<H>  4.7   |  25  |  10.20<H>    Ca    7.8<L>      22 Feb 2022 11:47    TPro  7.1  /  Alb  3.1<L>  /  TBili  0.5  /  DBili  x   /  AST  15  /  ALT  13  /  AlkPhos  95  02-22    Creatinine Trend: 10.20<--, 9.57<--      PHYSICAL EXAM:  T(C): 36.6 (02-23-22 @ 07:20), Max: 37.2 (02-22-22 @ 14:26)  HR: 79 (02-23-22 @ 07:20) (66 - 110)  BP: 158/84 (02-23-22 @ 07:20) (154/88 - 205/113)  RR: 18 (02-23-22 @ 07:20) (16 - 20)  SpO2: 94% (02-23-22 @ 07:20) (87% - 100%)  Wt(kg): --  I&O's Summary    22 Feb 2022 07:01  -  23 Feb 2022 07:00  --------------------------------------------------------  IN: 600 mL / OUT: 3765 mL / NET: -3165 mL    GEN: L BKA  HEENT:  (-)icterus (-)pallor  CV: N S1 S2 1/6 DIANA (+)2 Pulses B/l  Resp:  Clear to ausculatation B/L, normal effort  GI: (+) BS Soft, NT, ND  Lymph:  (-)Edema, (-)obvious lymphadenopathy  Skin: Warm to touch, Normal turgor  Psych: Appropriate mood and affect          ASSESSMENT/PLAN: 	61y  Male  [facility] with a significant medical history of HTN, Anemia, CAD, HLD, T2DM, GERD, Adrenal Insuffiencey, Left BKA, prior GI Bleed, and ESRD on HD (TTS) was brought to the ED for complaints of shortness of breath x 1 day.    - borderline elevated trop in the setting opr renal disease of unclear clinical significance.  Presentation is inconsitent with ACS  - prelim echo with preserved LV function  - Plan for stress in AM  - HD per renal      Dominic Still MD, FACC  BEEPER [phone]     C A R D I O L O G Y  **********************************     DATE OF SERVICE: 02-23-22    Patient denies chest pain or shortness of breath.   Review of systems otherwise (-)  	  MEDICATIONS:  MEDICATIONS  (STANDING):  ALBUTerol    90 MICROgram(s) HFA Inhaler 2 Puff(s) Inhalation every 6 hours  amLODIPine   Tablet 10 milliGRAM(s) Oral daily  cholecalciferol 1000 Unit(s) Oral daily  epoetin beverley-epbx (RETACRIT) Injectable 4000 Unit(s) IV Push <User Schedule>  ferrous    sulfate 325 milliGRAM(s) Oral daily  folic acid 1 milliGRAM(s) Oral daily  glucagon  Injectable 1 milliGRAM(s) IntraMuscular once  heparin   Injectable 5000 Unit(s) SubCutaneous every 12 hours  hydrALAZINE 25 milliGRAM(s) Oral three times a day  metoclopramide 5 milliGRAM(s) Oral three times a day  multivitamin 1 Tablet(s) Oral daily  nortriptyline 10 milliGRAM(s) Oral at bedtime  pantoprazole    Tablet 40 milliGRAM(s) Oral before breakfast  sevelamer carbonate 2400 milliGRAM(s) Oral three times a day with meals  sodium zirconium cyclosilicate 10 Gram(s) Oral daily  sucralfate suspension 1 Gram(s) Oral four times a day      LABS:	 	    CARDIAC MARKERS:  CARDIAC MARKERS ( 22 Feb 2022 11:47 )  x     / x     / 105 U/L / x     / 5.7 ng/mL        Troponin I, High Sensitivity Result: 108.0 ng/L (02-22-22 @ 11:47)  Troponin I, High Sensitivity Result: 86.1 ng/L (02-22-22 @ 00:58)                              8.0    3.18  )-----------( 95       ( 22 Feb 2022 11:47 )             25.5     Hemoglobin: 8.0 g/dL (02-22 @ 11:47)  Hemoglobin: 9.8 g/dL (02-22 @ 00:58)      02-22    138  |  105  |  38<H>  ----------------------------<  125<H>  4.7   |  25  |  10.20<H>    Ca    7.8<L>      22 Feb 2022 11:47    TPro  7.1  /  Alb  3.1<L>  /  TBili  0.5  /  DBili  x   /  AST  15  /  ALT  13  /  AlkPhos  95  02-22    Creatinine Trend: 10.20<--, 9.57<--      PHYSICAL EXAM:  T(C): 36.6 (02-23-22 @ 07:20), Max: 37.2 (02-22-22 @ 14:26)  HR: 79 (02-23-22 @ 07:20) (66 - 110)  BP: 158/84 (02-23-22 @ 07:20) (154/88 - 205/113)  RR: 18 (02-23-22 @ 07:20) (16 - 20)  SpO2: 94% (02-23-22 @ 07:20) (87% - 100%)  Wt(kg): --  I&O's Summary    22 Feb 2022 07:01  -  23 Feb 2022 07:00  --------------------------------------------------------  IN: 600 mL / OUT: 3765 mL / NET: -3165 mL    GEN: L BKA  HEENT:  (-)icterus (-)pallor  CV: N S1 S2 1/6 DIANA (+)2 Pulses B/l  Resp:  Clear to ausculatation B/L, normal effort  GI: (+) BS Soft, NT, ND  Lymph:  (-)Edema, (-)obvious lymphadenopathy  Skin: Warm to touch, Normal turgor  Psych: Appropriate mood and affect          ASSESSMENT/PLAN: 	61y  Male  [facility] with a significant medical history of HTN, Anemia, CAD, HLD, T2DM, GERD, Adrenal Insuffiencey, Left BKA, prior GI Bleed, and ESRD on HD (TTS) was brought to the ED for complaints of shortness of breath x 1 day.    - borderline elevated trop in the setting opr renal disease of unclear clinical significance.  Presentation is inconsitent with ACS  - prelim echo with preserved LV function  - Plan for stress in AM  - HD per renal  - Would start Lipitor 20 QHS goal LDL <70  - Add Coreg 3.125 mg PO BID for BP control and suspected diastolic dysfunction  - off ASA due to history of GI bleeds      Dominic Still MD, [facility]  BEEPER [phone]

## 2022-02-23 NOTE — DISCHARGE NOTE PROVIDER - CARE PROVIDER_API CALL
Dario De La Torre  Phone: (   )    -  Fax: (   )    -  Follow Up Time:    Dario De La Torre  Phone: (   )    -  Fax: (   )    -  Follow Up Time:     Dominic Still)  Cardiovascular Disease  [address] [address]  Phone: [phone]  Fax: [phone]  Follow Up Time:     Marcos Mosher)  Internal Medicine; Nephrology  [address] [place] [address]  Phone: [phone]  Fax: [phone]  Follow Up Time:

## 2022-02-23 NOTE — DISCHARGE NOTE PROVIDER - NSDCFUSCHEDAPPT_GEN_ALL_CORE_FT
KIAN VALERO ; 04/18/2022 ; [facility] [address] KIAN Estrada ; 04/18/2022 ; [facility] [address]

## 2022-02-23 NOTE — PROGRESS NOTE ADULT - SUBJECTIVE AND OBJECTIVE BOX
[facility] : Progress Note :: [phone], (office [phone]),   Dr Mosher / Dr [place] / Dr Young / Dr Doll / Dr Varsha TURCIOS / Dr Tello / Dr Garcia / Dr Larry qiu  _____________________________________________________________________________________________    awaits stress test    fish (Rash)  liver (Anaphylaxis)  No Known Drug Allergies    Hospital Medications:   MEDICATIONS  (STANDING):  ALBUTerol    90 MICROgram(s) HFA Inhaler 2 Puff(s) Inhalation every 6 hours  amLODIPine   Tablet 10 milliGRAM(s) Oral daily  atorvastatin 20 milliGRAM(s) Oral at bedtime  carvedilol 3.125 milliGRAM(s) Oral every 12 hours  cholecalciferol 1000 Unit(s) Oral daily  epoetin beverley-epbx (RETACRIT) Injectable 4000 Unit(s) IV Push <User Schedule>  ferrous    sulfate 325 milliGRAM(s) Oral daily  folic acid 1 milliGRAM(s) Oral daily  glucagon  Injectable 1 milliGRAM(s) IntraMuscular once  heparin   Injectable 5000 Unit(s) SubCutaneous every 12 hours  hydrALAZINE 25 milliGRAM(s) Oral three times a day  metoclopramide 5 milliGRAM(s) Oral three times a day  multivitamin 1 Tablet(s) Oral daily  nortriptyline 10 milliGRAM(s) Oral at bedtime  pantoprazole    Tablet 40 milliGRAM(s) Oral before breakfast  sevelamer carbonate 2400 milliGRAM(s) Oral three times a day with meals  sodium zirconium cyclosilicate 10 Gram(s) Oral daily  sucralfate suspension 1 Gram(s) Oral four times a day        VITALS:  T(F): 98.5 (02-23-22 @ 15:30), Max: 98.5 (02-23-22 @ 15:30)  HR: 95 (02-23-22 @ 15:30)  BP: 167/89 (02-23-22 @ 15:30)  RR: 17 (02-23-22 @ 15:30)  SpO2: 95% (02-23-22 @ 15:30)  Wt(kg): --    02-22 @ 07:01  -  02-23 @ 07:00  --------------------------------------------------------  IN: 600 mL / OUT: 3765 mL / NET: -3165 mL        PHYSICAL EXAM:  Constitutional: NAD  HEENT: anicteric sclera, oropharynx clear.  Neck: No JVD  Respiratory: CTAB, no wheezes, rales or rhonchi  Cardiovascular: S1, S2, RRR  Gastrointestinal: BS+, soft, NT/ND  Extremities:  No peripheral edema  Neurological: A/O x 3, no focal deficits  Vascular Access: AVF with thrill  and bruit    LABS:  02-22    138  |  105  |  38<H>  ----------------------------<  125<H>  4.7   |  25  |  10.20<H>    Ca    7.8<L>      22 Feb 2022 11:47    TPro  7.1  /  Alb  3.1<L>  /  TBili  0.5  /  DBili      /  AST  15  /  ALT  13  /  AlkPhos  95  02-22    Creatinine Trend: 10.20 <--, 9.57 <--                        8.0    3.18  )-----------( 95       ( 22 Feb 2022 11:47 )             25.5     Urine Studies:      RADIOLOGY & ADDITIONAL STUDIES:

## 2022-02-23 NOTE — CHART NOTE - NSCHARTNOTEFT_GEN_A_CORE
EVENT: Received telephone call from RN that pt is c/o of constipation.     HPI: Patient is a 62 y/o male from [facility] with a significant medical history of HTN, Anemia, CAD, HLD, T2DM, GERD, Adrenal Insuffiencey, Left BKA, prior GI Bleed, and ESRD on HD (TTS) was brought to the ED for complaints of shortness of breath x 1 day. Admitted for Acute Hypoxic Respiratory Failure 2/2 to flash pulmonary edema due to hypertensive emergency vs new CHF vs fluid overload from ESRD.    SUBJECTIVE: " My last BM was 3 days ago. I need something to help me go"    OBJECTIVE:  Vital Signs Last 24 Hrs  T(C): 36.9 (23 Feb 2022 15:30), Max: 36.9 (23 Feb 2022 15:30)  T(F): 98.5 (23 Feb 2022 15:30), Max: 98.5 (23 Feb 2022 15:30)  HR: 95 (23 Feb 2022 15:30) (66 - 104)  BP: 167/89 (23 Feb 2022 15:30) (155/79 - 173/101)  BP(mean): --  RR: 17 (23 Feb 2022 15:30) (17 - 18)  SpO2: 95% (23 Feb 2022 15:30) (87% - 100%)    FOCUSED PHYSICAL EXAM:  Neuro: awake, alert, oriented x 3. No neuro deficit  Cardiovascular: Pulses +2 B/L in lower and upper extremities, HR regular, BP stable, No edema.  Respiratory: Respirations regular, unlabored, breath sounds clear B/L.   GI: Abdomen soft, non-tender, positive bowel sounds.  : no bladder distention noted. No complaints at this time.  Skin: Dry, intact, no bruising, no diaphoresis.    LABS:                        8.0    3.18  )-----------( 95       ( 22 Feb 2022 11:47 )             25.5   CARDIAC MARKERS ( 22 Feb 2022 11:47 )  x     / x     / 105 U/L / x     / 5.7 ng/mL    02-22    138  |  105  |  38<H>  ----------------------------<  125<H>  4.7   |  25  |  10.20<H>    Ca    7.8<L>      22 Feb 2022 11:47    TPro  7.1  /  Alb  3.1<L>  /  TBili  0.5  /  DBili  x   /  AST  15  /  ALT  13  /  AlkPhos  95  02-22      EKG:   IMAGING:    ASSESSMENT/PROBLEM: Constipation      PLAN:   1. Senna 2 tabs, PO, QHS ordered  2. Monitor response to treatment  3. Cont present care/treatment  4. Supportive care

## 2022-02-23 NOTE — DISCHARGE NOTE PROVIDER - NSDCMRMEDTOKEN_GEN_ALL_CORE_FT
albuterol 90 mcg/inh inhalation aerosol: 2 puff(s) inhaled every 6 hours, As needed, Shortness of Breath and/or Wheezing  amLODIPine 10 mg oral tablet: 1 tab(s) orally once a day  cholecalciferol oral tablet: 1000 unit(s) orally once a day  ferrous sulfate 325 mg (65 mg elemental iron) oral tablet: 1 tab(s) orally every other day  folic acid 1 mg oral tablet: 1 tab(s) orally once a day  insulin glargine: 6 unit(s) subcutaneously once a day (at bedtime)   insulin lispro 100 units/mL injectable solution: 6  injectable 3 times a day  Lokelma 10 g oral powder for reconstitution: 10 gram(s) orally once a day   metoclopramide 5 mg oral tablet: 1 tab(s) orally 3 times a day  nortriptyline 10 mg oral capsule: 1 cap(s) orally once a day (at bedtime)  pantoprazole 40 mg oral delayed release tablet: 1 tab(s) orally every 12 hours  Polina-Dior oral tablet: 1 tab(s) orally once a day  sevelamer carbonate 800 mg oral tablet: 3 tab(s) orally 3 times a day (with meals)  simvastatin 40 mg oral tablet: 1 tab(s) orally once a day (at bedtime)  sucralfate 1 g/10 mL oral suspension: 10 milliliter(s) orally 4 times a day  zolpidem 5 mg oral tablet: 1 tab(s) orally once a day (at bedtime), As needed, Insomnia   albuterol 90 mcg/inh inhalation aerosol: 2 puff(s) inhaled every 6 hours, As needed, Shortness of Breath and/or Wheezing  amLODIPine 10 mg oral tablet: 1 tab(s) orally once a day  carvedilol 3.125 mg oral tablet: 1 tab(s) orally every 12 hours  cholecalciferol oral tablet: 1000 unit(s) orally once a day  ferrous sulfate 325 mg (65 mg elemental iron) oral tablet: 1 tab(s) orally every other day  folic acid 1 mg oral tablet: 1 tab(s) orally once a day  insulin lispro 100 units/mL injectable solution: 6  injectable 3 times a day  metoclopramide 5 mg oral tablet: 1 tab(s) orally 3 times a day  nortriptyline 10 mg oral capsule: 1 cap(s) orally once a day (at bedtime)  pantoprazole 40 mg oral delayed release tablet: 1 tab(s) orally every 12 hours  Polina-Dior oral tablet: 1 tab(s) orally once a day  sevelamer carbonate 800 mg oral tablet: 3 tab(s) orally 3 times a day (with meals)  simvastatin 40 mg oral tablet: 1 tab(s) orally once a day (at bedtime)  sodium zirconium cyclosilicate: 10 gram(s) orally once a day  sucralfate 1 g/10 mL oral suspension: 10 milliliter(s) orally 4 times a day  zolpidem 5 mg oral tablet: 1 tab(s) orally once a day (at bedtime), As needed, Insomnia   albuterol 90 mcg/inh inhalation aerosol: 2 puff(s) inhaled every 6 hours, As needed, Shortness of Breath and/or Wheezing  amLODIPine 10 mg oral tablet: 1 tab(s) orally once a day  carvedilol 3.125 mg oral tablet: 1 tab(s) orally every 12 hours  cholecalciferol oral tablet: 1000 unit(s) orally once a day  epoetin beverley: 4000 unit(s) intravenous 3 times a week (T, TH, &amp; Sat) during HD  ferrous sulfate 325 mg (65 mg elemental iron) oral tablet: 1 tab(s) orally once a day  folic acid 1 mg oral tablet: 1 tab(s) orally once a day  hydrALAZINE 25 mg oral tablet: 1 tab(s) orally 3 times a day  insulin lispro 100 units/mL injectable solution: unit(s) injectable 3 times a day  1 Unit(s) if Glucose 151 - 200  2 Unit(s) if Glucose 201 - 250  3 Unit(s) if Glucose 251 - 300  4 Unit(s) if Glucose 301 - 350  5 Unit(s) if Glucose 351 - 400  6 Unit(s) if Glucose Greater Than 400  metoclopramide 5 mg oral tablet: 1 tab(s) orally 3 times a day  nortriptyline 10 mg oral capsule: 1 cap(s) orally once a day (at bedtime)  pantoprazole 40 mg oral delayed release tablet: 1 tab(s) orally once a day (before a meal)  Polina-Dior oral tablet: 1 tab(s) orally once a day  senna oral tablet: 2 tab(s) orally once a day (at bedtime)  sevelamer carbonate 800 mg oral tablet: 3 tab(s) orally 3 times a day (with meals)  simvastatin 40 mg oral tablet: 1 tab(s) orally once a day (at bedtime)  sodium zirconium cyclosilicate: 10 gram(s) orally once a day  sucralfate 1 g/10 mL oral suspension: 10 milliliter(s) orally 4 times a day  zolpidem 5 mg oral tablet: 1 tab(s) orally once a day (at bedtime), As needed, Insomnia

## 2022-02-23 NOTE — DISCHARGE NOTE PROVIDER - HOSPITAL COURSE
60 y/o male from [facility] with a past medical history of hypertension, anemia, CAD, hyperlipidemia, diabetes mellitus,   GERD and adrenal insuffiencey, ESRD on HD T,Th,S presented with co vomiting   Patient denies any abdominal pain, SOB, sp missed HD sesion  In ED blood pressure 189/98, hypoxic , BNP 81,328, Troponin 86.1   CXR with signs of fluid overload and worsening left sided effusion  Placed on 12 L NRB   Admitted for acute respiratory failure, acute on chronic pulmonary edema and   had pulmonary edema and hypoxia and pulmonary emergency   Cardiology, pulmonology consults were called  Nuclear stress test and transthoracic echo was recommended for further evaluation   Nephrology consulted, pt underwent HD session   with significant improvement of symptoms.   compliance with HD reinforced   Oxygen was titrated down and oxygen saturation remains above 92% on RA   Pt refused further workup including TTE and Nuclear stress test  Pt also refused blood work, IV access and telemetry monitoring   Pt was counselled at length on the importance of completion of medical evaluation and treatment and compliance with HD and medication,   Risks and complications of noncompliance with treatment and recommendations including cardiovascular failure, stroke, infection, sepsis and death were discussed   Pt verbalized understanding    60 y/o male from [facility] with a past medical history of hypertension, anemia, CAD, hyperlipidemia, diabetes mellitus,   GERD and adrenal insuffiencey, ESRD on HD T,Th,S presented with co vomiting and shortness of breath    sp missed HD session  In ED blood pressure 189/98, hypoxic , BNP 81,328, Troponin 86.1   CXR with signs of fluid overload and worsening left sided effusion  Placed on 12 L NRB   Admitted for acute respiratory failure, acute on chronic pulmonary edema and hypertensive  emergency   Cardiology, pulmonology consulted   Nuclear stress test and transthoracic echo was recommended for further evaluation   Nephrology consulted, pt underwent HD session   with significant improvement of symptoms.   compliance with HD reinforced   Oxygen was titrated down and oxygen saturation remains above 92% on RA   Pt refused further workup including TTE and Nuclear stress test  Pt also refused blood work, IV access and telemetry monitoring   Pt was counselled at length on the importance of completion of medical evaluation and treatment and compliance with HD and medication,   Risks and complications of noncompliance with treatment and recommendations including cardiovascular failure, stroke, infection, sepsis and death were discussed   Pt verbalized understanding    60 y/o male from [facility] with a past medical history of hypertension, anemia, CAD, hyperlipidemia, diabetes mellitus,   GERD and adrenal insuffiencey, ESRD on HD T,Th,S presented with co vomiting and shortness of breath. Pt missed HD session.  In ED blood pressure 189/98, hypoxic , BNP 81,328, Troponin 86.1; CXR with signs of fluid overload and worsening left sided effusion  Placed on 12 L NRB. Oxygen was titrated down and oxygen saturation remains above 92% on RA   Admitted for acute respiratory failure, acute on chronic pulmonary edema and hypertensive  emergency. Nephrology consulted, pt underwent HD session with significant improvement of symptoms.  Cardiology, & pulmonology consulted   Nuclear stress test and transthoracic echo was recommended for further evaluation   Pt refused further workup including TTE and Nuclear stress test  Pt also refused blood work, IV access and telemetry monitoring   Pt was counselled at length on the importance of completion of medical evaluation and treatment and compliance with HD and medication,   Risks and complications of noncompliance with treatment and recommendations including cardiovascular failure, stroke, infection, sepsis and death were discussed   Pt verbalized understanding       >>>>>>>>>>>>>>>>>>>>>>>>>>>>>>>>>>>>>>>>>>>>>>>>INCOMPLETE>>>>>>>>>>>>>>>>>>>>>>>>>>>>>>>>>>>>>>>>>>>>

## 2022-02-23 NOTE — DISCHARGE NOTE PROVIDER - NSDCCPCAREPLAN_GEN_ALL_CORE_FT
PRINCIPAL DISCHARGE DIAGNOSIS  Diagnosis: Pulmonary edema  Assessment and Plan of Treatment: You were admitted for shorthness of breath with hypoxia likely due to fluid overload. You were placed on oxygen with improvment in oxygen saturation   You underwent HD session with signnificant improvment of symptoms   You were evaluated by the cardiologist with recommendations for further cardiac work up including nuclear stress test, telemetry monitoring, blood work and transthoracic echocardiogram however you declined all the recommended tests   It is extremly important to folliow up with your primary care doctor and a cardiologist to complete the recommended work up   Risks and complecations of noncomplience/incomplete evaluation and testing include  cardiovascular failure, stroke, infection, sepsis and death         SECONDARY DISCHARGE DIAGNOSES  Diagnosis: ESRD on dialysis  Assessment and Plan of Treatment: Continue Hemodialysis as scheduled  Avoid taking (NSAIDs) - (ex: Ibuprofen, Advil, Celebrex, Naprosyn)  Avoid taking any nephrotoxic agents (can harm kidneys) - Intravenous contrast for diagnostic testing, combination cold medications.  Have all medications adjusted for your renal function by your Health Care Provider.  Blood pressure control is important.  Take all medication as prescribed.    Diagnosis: HTN (hypertension)  Assessment and Plan of Treatment: Continue all your medications as prescribed by your doctor   Follow up wjith PCP for further managment   Recommend the DASH Diet. This diet emphasizes vegetables, fruits, and fat-free or low-fat dairy products.  Includes whole grains, fish, poultry, beans, seeds, nuts, and vegetable oils. Please limit sodium, sweets, sugary beverages, and red meats.      Diagnosis: CAD (coronary artery disease)  Assessment and Plan of Treatment:      PRINCIPAL DISCHARGE DIAGNOSIS  Diagnosis: Pulmonary edema  Assessment and Plan of Treatment: You were admitted for shorthness of breath with hypoxia likely due to fluid overload. You were placed on oxygen with improvment in oxygen saturation   You underwent HD session with signnificant improvment of symptoms   You were evaluated by the cardiologist with recommendations for further cardiac work up including nuclear stress test, telemetry monitoring, blood work and transthoracic echocardiogram however you declined all the recommended tests   It is extremly important to folliow up with your primary care doctor and a cardiologist to complete the recommended work up   Risks and complecations of noncomplience/incomplete evaluation and testing include  cardiovascular failure, stroke, infection, sepsis and death         SECONDARY DISCHARGE DIAGNOSES  Diagnosis: ESRD on dialysis  Assessment and Plan of Treatment: Continue Hemodialysis as scheduled  Avoid taking (NSAIDs) - (ex: Ibuprofen, Advil, Celebrex, Naprosyn)  Avoid taking any nephrotoxic agents (can harm kidneys) - Intravenous contrast for diagnostic testing, combination cold medications.  Have all medications adjusted for your renal function by your Health Care Provider.  Blood pressure control is important.  Take all medication as prescribed.    Diagnosis: HTN (hypertension)  Assessment and Plan of Treatment: Continue all your medications as prescribed by your doctor   Follow up wjith PCP for further managment   Recommend the DASH Diet. This diet emphasizes vegetables, fruits, and fat-free or low-fat dairy products.  Includes whole grains, fish, poultry, beans, seeds, nuts, and vegetable oils. Please limit sodium, sweets, sugary beverages, and red meats.      Diagnosis: CAD (coronary artery disease)  Assessment and Plan of Treatment: You had an echocardiogram on 2/22 that showed abnormality in your heart.  Please follow up with a Cardiologist to further evaluate your heart.  You refused a stress test while in the hospital.  Therefore, you are recommended to have a stress test.  Please discuss this with your PCP.

## 2022-02-23 NOTE — PROGRESS NOTE ADULT - SUBJECTIVE AND OBJECTIVE BOX
NP Note discussed with  primary attending    Patient is a 61y old  Male who presents with a chief complaint of Shortness of Breath (23 Feb 2022 17:27)      INTERVAL HPI/OVERNIGHT EVENTS: no new complaints    MEDICATIONS  (STANDING):  ALBUTerol    90 MICROgram(s) HFA Inhaler 2 Puff(s) Inhalation every 6 hours  amLODIPine   Tablet 10 milliGRAM(s) Oral daily  atorvastatin 20 milliGRAM(s) Oral at bedtime  carvedilol 3.125 milliGRAM(s) Oral every 12 hours  cholecalciferol 1000 Unit(s) Oral daily  epoetin beverley-epbx (RETACRIT) Injectable 4000 Unit(s) IV Push <User Schedule>  ferrous    sulfate 325 milliGRAM(s) Oral daily  folic acid 1 milliGRAM(s) Oral daily  glucagon  Injectable 1 milliGRAM(s) IntraMuscular once  heparin   Injectable 5000 Unit(s) SubCutaneous every 12 hours  hydrALAZINE 25 milliGRAM(s) Oral three times a day  metoclopramide 5 milliGRAM(s) Oral three times a day  multivitamin 1 Tablet(s) Oral daily  nortriptyline 10 milliGRAM(s) Oral at bedtime  pantoprazole    Tablet 40 milliGRAM(s) Oral before breakfast  sevelamer carbonate 2400 milliGRAM(s) Oral three times a day with meals  sodium zirconium cyclosilicate 10 Gram(s) Oral daily  sucralfate suspension 1 Gram(s) Oral four times a day    MEDICATIONS  (PRN):  acetaminophen     Tablet .. 650 milliGRAM(s) Oral every 6 hours PRN Temp greater or equal to 38C (100.4F), Mild Pain (1 - 3)  melatonin 3 milliGRAM(s) Oral at bedtime PRN Insomnia  ondansetron Injectable 4 milliGRAM(s) IV Push every 8 hours PRN Nausea and/or Vomiting  zolpidem 5 milliGRAM(s) Oral at bedtime PRN Insomnia      __________________________________________________  REVIEW OF SYSTEMS:    CONSTITUTIONAL: No fever, s  RESPIRATORY: No cough; + intermittent  shortness of breath  CARDIOVASCULAR: No chest pain, no palpitations  GASTROINTESTINAL: No pain. No nausea or vomiting; No diarrhea   NEUROLOGICAL: No headache or numbness, no tremors  MUSCULOSKELETAL: No joint pain, no muscle pain  GENITOURINARY: no dysuria, still makes urine         Vital Signs Last 24 Hrs  T(C): 36.9 (23 Feb 2022 15:30), Max: 36.9 (23 Feb 2022 15:30)  T(F): 98.5 (23 Feb 2022 15:30), Max: 98.5 (23 Feb 2022 15:30)  HR: 95 (23 Feb 2022 15:30) (66 - 110)  BP: 167/89 (23 Feb 2022 15:30) (155/79 - 205/113)  BP(mean): 144 (22 Feb 2022 22:45) (144 - 144)  RR: 17 (23 Feb 2022 15:30) (17 - 20)  SpO2: 95% (23 Feb 2022 15:30) (87% - 100%)    ________________________________________________  PHYSICAL EXAM:  GENERAL: NAD  CHEST/LUNG: refused exam   HEART: refused exam   ABDOMEN: refused exam   EXTREMITIES: no cyanosis; no edema; no calf tenderness  SKIN: warm and dry; no rash  NERVOUS SYSTEM:  Awake and alert; Oriented  to place, person and time ; no new deficits    _________________________________________________  LABS:                        8.0    3.18  )-----------( 95       ( 22 Feb 2022 11:47 )             25.5     02-22    138  |  105  |  38<H>  ----------------------------<  125<H>  4.7   |  25  |  10.20<H>    Ca    7.8<L>      22 Feb 2022 11:47    TPro  7.1  /  Alb  3.1<L>  /  TBili  0.5  /  DBili  x   /  AST  15  /  ALT  13  /  AlkPhos  95  02-22    PT/INR - ( 22 Feb 2022 00:58 )   PT: 11.0 sec;   INR: 0.92 ratio         PTT - ( 22 Feb 2022 00:58 )  PTT:33.1 sec    CAPILLARY BLOOD GLUCOSE      POCT Blood Glucose.: 268 mg/dL (23 Feb 2022 11:28)  POCT Blood Glucose.: 111 mg/dL (23 Feb 2022 07:47)  POCT Blood Glucose.: 130 mg/dL (23 Feb 2022 05:59)  POCT Blood Glucose.: 82 mg/dL (22 Feb 2022 22:31)  POCT Blood Glucose.: 56 mg/dL (22 Feb 2022 22:04)        RADIOLOGY & ADDITIONAL TESTS:    Imaging Personally Reviewed:  YES/NO    Consultant(s) Notes Reviewed:   YES/ No    Care Discussed with Consultants :     Plan of care was discussed with patient and /or primary care giver; all questions and concerns were addressed and care was aligned with patient's wishes.

## 2022-02-23 NOTE — PROGRESS NOTE ADULT - PROBLEM SELECTOR PLAN 1
likely fluid overload vs new CHF   Trop 86.1, BNP 81K   pt refused TTE and telemetry monitoring   sp hemodialysis 2/22 with improvement in symptoms   Strict I/O's/daily wieghts as pt still makes urine  Nephrology Dr. Mosher  Cardiology Dr. Still  stress test in am 2/24

## 2022-02-23 NOTE — DISCHARGE NOTE PROVIDER - PROVIDER TOKENS
FREE:[LAST:[Britt],FIRST:[Dario],PHONE:[(   )    -],FAX:[(   )    -]] FREE:[LAST:[Ricku],FIRST:[Dario],PHONE:[(   )    -],FAX:[(   )    -]],PROVIDER:[TOKEN:[[id number]]],PROVIDER:[TOKEN:[[id number]]]

## 2022-02-24 ENCOUNTER — TRANSCRIPTION ENCOUNTER (OUTPATIENT)
Age: 62
End: 2022-02-24

## 2022-02-24 VITALS
OXYGEN SATURATION: 97 % | RESPIRATION RATE: 16 BRPM | SYSTOLIC BLOOD PRESSURE: 178 MMHG | HEART RATE: 96 BPM | DIASTOLIC BLOOD PRESSURE: 94 MMHG | TEMPERATURE: 98 F

## 2022-02-24 LAB
GLUCOSE BLDC GLUCOMTR-MCNC: 127 MG/DL — HIGH (ref 70–99)
GLUCOSE BLDC GLUCOMTR-MCNC: 202 MG/DL — HIGH (ref 70–99)

## 2022-02-24 RX ORDER — CHLORHEXIDINE GLUCONATE 213 G/1000ML
1 SOLUTION TOPICAL DAILY
Refills: 0 | Status: DISCONTINUED | OUTPATIENT
Start: 2022-02-24 | End: 2022-02-24

## 2022-02-24 RX ORDER — HYDRALAZINE HCL 50 MG
1 TABLET ORAL
Qty: 0 | Refills: 0 | DISCHARGE
Start: 2022-02-24

## 2022-02-24 RX ORDER — PANTOPRAZOLE SODIUM 20 MG/1
1 TABLET, DELAYED RELEASE ORAL
Qty: 0 | Refills: 0 | DISCHARGE
Start: 2022-02-24

## 2022-02-24 RX ORDER — INSULIN LISPRO 100/ML
0 VIAL (ML) SUBCUTANEOUS
Qty: 0 | Refills: 0 | DISCHARGE
Start: 2022-02-24

## 2022-02-24 RX ORDER — FERROUS SULFATE 325(65) MG
1 TABLET ORAL
Qty: 0 | Refills: 0 | DISCHARGE
Start: 2022-02-24

## 2022-02-24 RX ORDER — SODIUM ZIRCONIUM CYCLOSILICATE 10 G/10G
10 POWDER, FOR SUSPENSION ORAL
Qty: 0 | Refills: 0 | DISCHARGE
Start: 2022-02-24

## 2022-02-24 RX ORDER — CARVEDILOL PHOSPHATE 80 MG/1
1 CAPSULE, EXTENDED RELEASE ORAL
Qty: 0 | Refills: 0 | DISCHARGE
Start: 2022-02-24

## 2022-02-24 RX ORDER — ERYTHROPOIETIN 10000 [IU]/ML
4000 INJECTION, SOLUTION INTRAVENOUS; SUBCUTANEOUS
Qty: 0 | Refills: 0 | DISCHARGE
Start: 2022-02-24

## 2022-02-24 RX ORDER — SENNA PLUS 8.6 MG/1
2 TABLET ORAL
Qty: 0 | Refills: 0 | DISCHARGE
Start: 2022-02-24

## 2022-02-24 RX ADMIN — Medication 325 MILLIGRAM(S): at 11:50

## 2022-02-24 RX ADMIN — CARVEDILOL PHOSPHATE 3.12 MILLIGRAM(S): 80 CAPSULE, EXTENDED RELEASE ORAL at 17:38

## 2022-02-24 RX ADMIN — AMLODIPINE BESYLATE 10 MILLIGRAM(S): 2.5 TABLET ORAL at 05:59

## 2022-02-24 RX ADMIN — Medication 5 MILLIGRAM(S): at 19:11

## 2022-02-24 RX ADMIN — SEVELAMER CARBONATE 2400 MILLIGRAM(S): 2400 POWDER, FOR SUSPENSION ORAL at 17:37

## 2022-02-24 RX ADMIN — Medication 1 TABLET(S): at 11:50

## 2022-02-24 RX ADMIN — Medication 25 MILLIGRAM(S): at 19:05

## 2022-02-24 RX ADMIN — CHLORHEXIDINE GLUCONATE 1 APPLICATION(S): 213 SOLUTION TOPICAL at 11:51

## 2022-02-24 RX ADMIN — SEVELAMER CARBONATE 2400 MILLIGRAM(S): 2400 POWDER, FOR SUSPENSION ORAL at 11:50

## 2022-02-24 RX ADMIN — Medication 1 GRAM(S): at 05:59

## 2022-02-24 RX ADMIN — Medication 25 MILLIGRAM(S): at 05:59

## 2022-02-24 RX ADMIN — Medication 1 MILLIGRAM(S): at 11:50

## 2022-02-24 RX ADMIN — Medication 1 GRAM(S): at 11:51

## 2022-02-24 RX ADMIN — Medication 1 GRAM(S): at 17:38

## 2022-02-24 RX ADMIN — Medication 5 MILLIGRAM(S): at 05:58

## 2022-02-24 RX ADMIN — ERYTHROPOIETIN 4000 UNIT(S): 10000 INJECTION, SOLUTION INTRAVENOUS; SUBCUTANEOUS at 14:56

## 2022-02-24 RX ADMIN — Medication 1000 UNIT(S): at 11:50

## 2022-02-24 RX ADMIN — Medication 1 GRAM(S): at 00:41

## 2022-02-24 NOTE — PROGRESS NOTE ADULT - SUBJECTIVE AND OBJECTIVE BOX
Patient is a 61y old  Male who presents with a chief complaint of Shortness of Breath (2022 12:52)    PATIENT IS SEEN AND EXAMINED IN MEDICAL FLOOR.  MARIIT [    ]    JEREMY [   ]      GT [   ]    ALLERGIES:  fish (Rash)  liver (Anaphylaxis)  No Known Drug Allergies      Daily     Daily Weight in k.4 (2022 14:30)    VITALS:    Vital Signs Last 24 Hrs  T(C): 37.2 (2022 17:30), Max: 37.2 (2022 17:30)  T(F): 98.9 (2022 17:30), Max: 98.9 (2022 17:30)  HR: 105 (2022 17:30) (53 - 105)  BP: 170/89 (2022 17:30) (127/89 - 170/89)  BP(mean): --  RR: 17 (2022 17:30) (16 - 18)  SpO2: 97% (2022 17:30) (95% - 100%)    LABS:              CAPILLARY BLOOD GLUCOSE      POCT Blood Glucose.: 127 mg/dL (2022 17:23)          Creatinine Trend: 10.20<--, 9.57<--  I&O's Summary          .Blood Blood-Peripheral   @ 03:45   No growth to date.  --  --      .Blood Blood-Peripheral   @ 20:58   No Growth Final  --  --      .Blood Blood-Peripheral   @ 20:56   No Growth Final  --  --          MEDICATIONS:    MEDICATIONS  (STANDING):  ALBUTerol    90 MICROgram(s) HFA Inhaler 2 Puff(s) Inhalation every 6 hours  amLODIPine   Tablet 10 milliGRAM(s) Oral daily  atorvastatin 20 milliGRAM(s) Oral at bedtime  carvedilol 3.125 milliGRAM(s) Oral every 12 hours  chlorhexidine 2% Cloths 1 Application(s) Topical daily  cholecalciferol 1000 Unit(s) Oral daily  epoetin beverley-epbx (RETACRIT) Injectable 4000 Unit(s) IV Push <User Schedule>  ferrous    sulfate 325 milliGRAM(s) Oral daily  folic acid 1 milliGRAM(s) Oral daily  glucagon  Injectable 1 milliGRAM(s) IntraMuscular once  heparin   Injectable 5000 Unit(s) SubCutaneous every 12 hours  hydrALAZINE 25 milliGRAM(s) Oral three times a day  metoclopramide 5 milliGRAM(s) Oral three times a day  multivitamin 1 Tablet(s) Oral daily  nortriptyline 10 milliGRAM(s) Oral at bedtime  pantoprazole    Tablet 40 milliGRAM(s) Oral before breakfast  senna 2 Tablet(s) Oral at bedtime  sevelamer carbonate 2400 milliGRAM(s) Oral three times a day with meals  sodium zirconium cyclosilicate 10 Gram(s) Oral daily  sucralfate suspension 1 Gram(s) Oral four times a day      MEDICATIONS  (PRN):  acetaminophen     Tablet .. 650 milliGRAM(s) Oral every 6 hours PRN Temp greater or equal to 38C (100.4F), Mild Pain (1 - 3)  melatonin 3 milliGRAM(s) Oral at bedtime PRN Insomnia  ondansetron Injectable 4 milliGRAM(s) IV Push every 8 hours PRN Nausea and/or Vomiting  zolpidem 5 milliGRAM(s) Oral at bedtime PRN Insomnia      REVIEW OF SYSTEMS:                           ALL ROS DONE [ X   ]    CONSTITUTIONAL:  LETHARGIC [   ], FEVER [   ], UNRESPONSIVE [   ]  CVS:  CP  [   ], SOB, [   ], PALPITATIONS [   ], DIZZYNESS [   ]  RS: COUGH [   ], SPUTUM [   ]  GI: ABDOMINAL PAIN [   ], NAUSEA [   ], VOMITINGS [   ], DIARRHEA [   ], CONSTIPATION [   ]  :  DYSURIA [   ], NOCTURIA [   ], INCREASED FREQUENCY [   ], DRIBLING [   ],  SKELETAL: PAINFUL JOINTS [   ], SWOLLEN JOINTS [   ], NECK ACHE [   ], LOW BACK ACHE [   ],  SKIN : ULCERS [   ], RASH [   ], ITCHING [   ]  CNS: HEAD ACHE [   ], DOUBLE VISION [   ], BLURRED VISION [   ], AMS / CONFUSION [   ], SEIZURES [   ], WEAKNESS [   ],TINGLING / NUMBNESS [   ]    PHYSICAL EXAMINATION:  GENERAL APPEARANCE: NO DISTRESS  HEENT:  NO PALLOR, NO  JVD,  NO   NODES, NECK SUPPLE  CVS: S1 +, S2 +,   RS: AEEB,  OCCASIONAL  RALES +,   NO RONCHI  ABD: SOFT, NT, NO, BS +  EXT: NO PE   , LEFT BKA +  SKIN: WARM, RIGHT UPPER EXTREMITY AF +  SKELETAL:  ROM ACCEPTABLE  CNS:  AAO X 3,  VISUALLY IMPAIRED +    RADIOLOGY :    < from: CT Abdomen and Pelvis No Cont (21 @ 14:07) >  IMPRESSION:  *  Mild pulmonary edema and trace bilateral pleural effusions.  *  Moderate cardiomegaly and trace pericardial effusion.  *  Thickening and patulous esophagus again noted as before.        < end of copied text >  < from: CT Chest No Cont (21 @ 14:07) >  IMPRESSION:  *  Mild pulmonary edema and trace bilateral pleural effusions.  *  Moderate cardiomegaly and trace pericardial effusion.  *  Thickening and patulous esophagus again noted as before.      < end of copied text >        ASSESSMENT :     Hematemesis      Hypertension    Adrenal insufficiency    CKD (chronic kidney disease)    Anemia    Glaucoma    Coronary artery disease    HLD (hyperlipidemia)    Peripheral vascular disease    Spinal stenosis of lumbosacral region    Hyperparathyroidism    Diabetes mellitus    Diabetic neuropathy    Contracture of hand    Osteoarthritis    Osteoporosis    Vision loss of left eye    ESRD on hemodialysis    Cataract    BPH (benign prostatic hyperplasia)    UTI (urinary tract infection)    Bladder mass    H/O hematuria    Osteoporosis    Vision loss of right eye    Depression    Chronic GERD    Osteomyelitis of vertebra    Below knee amputation status, left    History of right cataract extraction    History of left cataract extraction    S/P arteriovenous (AV) fistula creation    H/O hematuria    H/O transurethral destruction of bladder lesion    History of excision of mass        PLAN:  HPI:  Patient is a 62 y/o male from [facility] with a past medical history of hypertension, anemia, CAD, hyperlipidemia, diabetes mellitus, GERD and adrenal insuffiencey, ESRD on HD T,,S reports to the ED for vomiting blood. Patient noted to  have one episode of dark vomiting and complains of nausea. Patient denies any abdominal pain, dysphagia, hematochezia or melena. Patient denies any changes to appetite or weight. Patient denies any prior upper endoscopy or prior episodes. Patient also missed dialysis yesterday. Patient, however, states he had dialysis yesterday and is scheduled for the next dialysis appointment on Saturday. Patient's dialysis schedule is , , S. Patient denies any cp, sob or palpitations. Denies lower extremity swelling. Denies fevers or chills, sob or cough.    (2022 16:07)      - COUNSELLED PATIENT AT LENGTH ON THE IMPORTANCE OF EVALUATION, MEDICATION COMPLIANCE, BLOOD WORK, IV ACCESS, COMPLETE HD SESSIONS, EATING MEALS REGULARLY [GIVEN DM]. PATIENT VERBALIZED UNDERSTANDING REGARDING RISKS INCLUDE BUT ARE NOT LIMITED TO ARRYTHMIA, CVA, MI AND DEATH. PATIENT'S NST WAS DELAYED ON  - PATIENT REFUSED TO STAY FOR FURTHER CARDIAC TESTING DESPITE COUNSELLING ON MORTALITY RISK. PATIENT EXPRESSED UNDERSTANDING OF THE RISK AND PREFERS TO DEFER EVALUATION. D/C PLAN POST-HD TO ASSISTED LIVING. WILL RECOMMEND COORDINATING FOR OUTPATIENT HD.    # ACUTE HYPOXIC RESPIRATORY FAILURE, ACUTE ON CHRONIC PULMONARY EDEMA, HYPERTENSIVE URGENCY   - SUPPLEMENTAL OXYGEN -- WAS TOLERATING ROOM AIR PRIOR TO AND POST-HD  - PLACED ON AMLODIPINE, HYDRALAZINE, COREG  - CARDIOLOGY CONSULT IN PROGRESS  - NEPHROLOGY CONSULT    - ECHO - MILD MR, LAE, MILD CONCENTRIC LVH, NORMAL LVEF, NORMAL DIASTOLIC FUNCTION  -  DESPITE EXTENSIVE COUNSELLING PATIENT REFUSED TELEMETRY    - PATIENT IMPROVED AFTER HD     - PLANNED FOR NST TOMORROW    # UNDERLYING DM W/ HYPOGLYCEMIA - PATIENT IS REFUSING TO EAT/DRINK - FINGERSTICKS AND ENCOURAGING PO INTAKE, D50 PUSH + D5W IF BLOOD SUGAR DOES NOT IMPROVE  - HOLD INSULIN    # RECURRENT EMESIS W/ HISTORY OF ESOPHAGITIS AND DUODENITIS [2021], HX OF GASTROPARESIS   # SEVERE PROTEIN CALORIE MALNUTRITION, FAILURE TO THRIVE - NUTRITIONAL SUPPLEMENT  # ANEMIA OF CKD  # HLD  # ESRD ON HD TTS - W/ HX OF NONCOMPLIANCE - NEPHROLOGY CONSULT IN PROGRESS  # HTN  # DM   # PARTIALLY BLIND  # S/P LEFT BKA  # HX OF PVD  # LS SPINAL STENOSIS  # GI AND DVT PPX     Patient is a 61y old  Male who presents with a chief complaint of Shortness of Breath (2022 12:52)    PATIENT IS SEEN AND EXAMINED IN MEDICAL FLOOR.  MARIIT [    ]    JEREMY [   ]      GT [   ]    ALLERGIES:  fish (Rash)  liver (Anaphylaxis)  No Known Drug Allergies      Daily     Daily Weight in k.4 (2022 14:30)    VITALS:    Vital Signs Last 24 Hrs  T(C): 37.2 (2022 17:30), Max: 37.2 (2022 17:30)  T(F): 98.9 (2022 17:30), Max: 98.9 (2022 17:30)  HR: 105 (2022 17:30) (53 - 105)  BP: 170/89 (2022 17:30) (127/89 - 170/89)  BP(mean): --  RR: 17 (2022 17:30) (16 - 18)  SpO2: 97% (2022 17:30) (95% - 100%)    LABS:              CAPILLARY BLOOD GLUCOSE      POCT Blood Glucose.: 127 mg/dL (2022 17:23)          Creatinine Trend: 10.20<--, 9.57<--  I&O's Summary          .Blood Blood-Peripheral   @ 03:45   No growth to date.  --  --      .Blood Blood-Peripheral   @ 20:58   No Growth Final  --  --      .Blood Blood-Peripheral   @ 20:56   No Growth Final  --  --          MEDICATIONS:    MEDICATIONS  (STANDING):  ALBUTerol    90 MICROgram(s) HFA Inhaler 2 Puff(s) Inhalation every 6 hours  amLODIPine   Tablet 10 milliGRAM(s) Oral daily  atorvastatin 20 milliGRAM(s) Oral at bedtime  carvedilol 3.125 milliGRAM(s) Oral every 12 hours  chlorhexidine 2% Cloths 1 Application(s) Topical daily  cholecalciferol 1000 Unit(s) Oral daily  epoetin beverley-epbx (RETACRIT) Injectable 4000 Unit(s) IV Push <User Schedule>  ferrous    sulfate 325 milliGRAM(s) Oral daily  folic acid 1 milliGRAM(s) Oral daily  glucagon  Injectable 1 milliGRAM(s) IntraMuscular once  heparin   Injectable 5000 Unit(s) SubCutaneous every 12 hours  hydrALAZINE 25 milliGRAM(s) Oral three times a day  metoclopramide 5 milliGRAM(s) Oral three times a day  multivitamin 1 Tablet(s) Oral daily  nortriptyline 10 milliGRAM(s) Oral at bedtime  pantoprazole    Tablet 40 milliGRAM(s) Oral before breakfast  senna 2 Tablet(s) Oral at bedtime  sevelamer carbonate 2400 milliGRAM(s) Oral three times a day with meals  sodium zirconium cyclosilicate 10 Gram(s) Oral daily  sucralfate suspension 1 Gram(s) Oral four times a day      MEDICATIONS  (PRN):  acetaminophen     Tablet .. 650 milliGRAM(s) Oral every 6 hours PRN Temp greater or equal to 38C (100.4F), Mild Pain (1 - 3)  melatonin 3 milliGRAM(s) Oral at bedtime PRN Insomnia  ondansetron Injectable 4 milliGRAM(s) IV Push every 8 hours PRN Nausea and/or Vomiting  zolpidem 5 milliGRAM(s) Oral at bedtime PRN Insomnia      REVIEW OF SYSTEMS:                           ALL ROS DONE [ X   ]    CONSTITUTIONAL:  LETHARGIC [   ], FEVER [   ], UNRESPONSIVE [   ]  CVS:  CP  [   ], SOB, [   ], PALPITATIONS [   ], DIZZYNESS [   ]  RS: COUGH [   ], SPUTUM [   ]  GI: ABDOMINAL PAIN [   ], NAUSEA [   ], VOMITINGS [   ], DIARRHEA [   ], CONSTIPATION [   ]  :  DYSURIA [   ], NOCTURIA [   ], INCREASED FREQUENCY [   ], DRIBLING [   ],  SKELETAL: PAINFUL JOINTS [   ], SWOLLEN JOINTS [   ], NECK ACHE [   ], LOW BACK ACHE [   ],  SKIN : ULCERS [   ], RASH [   ], ITCHING [   ]  CNS: HEAD ACHE [   ], DOUBLE VISION [   ], BLURRED VISION [   ], AMS / CONFUSION [   ], SEIZURES [   ], WEAKNESS [   ],TINGLING / NUMBNESS [   ]    PHYSICAL EXAMINATION:  GENERAL APPEARANCE: NO DISTRESS  HEENT:  NO PALLOR, NO  JVD,  NO   NODES, NECK SUPPLE  CVS: S1 +, S2 +,   RS: AEEB,  OCCASIONAL  RALES +,   NO RONCHI  ABD: SOFT, NT, NO, BS +  EXT: NO PE   , LEFT BKA +  SKIN: WARM, RIGHT UPPER EXTREMITY AF +  SKELETAL:  ROM ACCEPTABLE  CNS:  AAO X 3,  VISUALLY IMPAIRED +    RADIOLOGY :    < from: CT Abdomen and Pelvis No Cont (21 @ 14:07) >  IMPRESSION:  *  Mild pulmonary edema and trace bilateral pleural effusions.  *  Moderate cardiomegaly and trace pericardial effusion.  *  Thickening and patulous esophagus again noted as before.        < end of copied text >  < from: CT Chest No Cont (21 @ 14:07) >  IMPRESSION:  *  Mild pulmonary edema and trace bilateral pleural effusions.  *  Moderate cardiomegaly and trace pericardial effusion.  *  Thickening and patulous esophagus again noted as before.      < end of copied text >        ASSESSMENT :     Hematemesis      Hypertension    Adrenal insufficiency    CKD (chronic kidney disease)    Anemia    Glaucoma    Coronary artery disease    HLD (hyperlipidemia)    Peripheral vascular disease    Spinal stenosis of lumbosacral region    Hyperparathyroidism    Diabetes mellitus    Diabetic neuropathy    Contracture of hand    Osteoarthritis    Osteoporosis    Vision loss of left eye    ESRD on hemodialysis    Cataract    BPH (benign prostatic hyperplasia)    UTI (urinary tract infection)    Bladder mass    H/O hematuria    Osteoporosis    Vision loss of right eye    Depression    Chronic GERD    Osteomyelitis of vertebra    Below knee amputation status, left    History of right cataract extraction    History of left cataract extraction    S/P arteriovenous (AV) fistula creation    H/O hematuria    H/O transurethral destruction of bladder lesion    History of excision of mass        PLAN:  HPI:  Patient is a 60 y/o male from [facility] with a past medical history of hypertension, anemia, CAD, hyperlipidemia, diabetes mellitus, GERD and adrenal insuffiencey, ESRD on HD T,,S reports to the ED for vomiting blood. Patient noted to  have one episode of dark vomiting and complains of nausea. Patient denies any abdominal pain, dysphagia, hematochezia or melena. Patient denies any changes to appetite or weight. Patient denies any prior upper endoscopy or prior episodes. Patient also missed dialysis yesterday. Patient, however, states he had dialysis yesterday and is scheduled for the next dialysis appointment on Saturday. Patient's dialysis schedule is , , S. Patient denies any cp, sob or palpitations. Denies lower extremity swelling. Denies fevers or chills, sob or cough.    (2022 16:07)      - COUNSELLED PATIENT AT LENGTH ON THE IMPORTANCE OF EVALUATION, MEDICATION COMPLIANCE, BLOOD WORK, IV ACCESS, COMPLETE HD SESSIONS, EATING MEALS REGULARLY [GIVEN DM]. PATIENT VERBALIZED UNDERSTANDING REGARDING RISKS INCLUDE BUT ARE NOT LIMITED TO ARRYTHMIA, CVA, MI AND DEATH. PATIENT'S NST WAS DELAYED ON  - PATIENT REFUSED TO STAY FOR FURTHER CARDIAC TESTING DESPITE COUNSELLING ON MORTALITY RISK. PATIENT EXPRESSED UNDERSTANDING OF THE RISK AND PREFERS TO DEFER EVALUATION. D/C PLAN POST-HD TO ASSISTED LIVING. WILL RECOMMEND COORDINATING FOR OUTPATIENT HD.    # ACUTE HYPOXIC RESPIRATORY FAILURE, ACUTE ON CHRONIC PULMONARY EDEMA, HYPERTENSIVE URGENCY   - SUPPLEMENTAL OXYGEN -- WAS TOLERATING ROOM AIR PRIOR TO AND POST-HD  - PLACED ON AMLODIPINE, HYDRALAZINE, COREG  - CARDIOLOGY CONSULT IN PROGRESS  - NEPHROLOGY CONSULT    - ECHO - MILD MR, LAE, MILD CONCENTRIC LVH, NORMAL LVEF, NORMAL DIASTOLIC FUNCTION  -  DESPITE EXTENSIVE COUNSELLING PATIENT REFUSED TELEMETRY    - PATIENT IMPROVED AFTER HD     - PATIENT'S NST WAS DELAYED ON  - PATIENT REFUSED TO STAY FOR FURTHER CARDIAC TESTING DESPITE COUNSELLING ON MORTALITY RISK. PATIENT EXPRESSED UNDERSTANDING OF THE RISK AND PREFERS TO DEFER EVALUATION. D/C PLAN POST-HD TO ASSISTED LIVING. WILL RECOMMEND COORDINATING FOR OUTPATIENT HD.    # UNDERLYING DM W/ HYPOGLYCEMIA - PATIENT IS REFUSING TO EAT/DRINK - FINGERSTICKS AND ENCOURAGING PO INTAKE, D50 PUSH + D5W IF BLOOD SUGAR DOES NOT IMPROVE  - HOLD INSULIN    # RECURRENT EMESIS W/ HISTORY OF ESOPHAGITIS AND DUODENITIS [2021], HX OF GASTROPARESIS   # SEVERE PROTEIN CALORIE MALNUTRITION, FAILURE TO THRIVE - NUTRITIONAL SUPPLEMENT  # ANEMIA OF CKD  # HLD  # ESRD ON HD TTS - W/ HX OF NONCOMPLIANCE - NEPHROLOGY CONSULT IN PROGRESS  # HTN  # DM   # PARTIALLY BLIND  # S/P LEFT BKA  # HX OF PVD  # LS SPINAL STENOSIS  # GI AND DVT PPX

## 2022-02-24 NOTE — PROGRESS NOTE ADULT - PROVIDER SPECIALTY LIST ADULT
Cardiology
Internal Medicine
Nephrology
Nephrology
Pulmonology
Cardiology
Internal Medicine

## 2022-02-24 NOTE — PROGRESS NOTE ADULT - SUBJECTIVE AND OBJECTIVE BOX
C A R D I O L O G Y  **********************************     DATE OF SERVICE: 02-24-22    Patient denies chest pain or shortness of breath.   Review of symptoms otherwise negative.    acetaminophen     Tablet .. 650 milliGRAM(s) Oral every 6 hours PRN  ALBUTerol    90 MICROgram(s) HFA Inhaler 2 Puff(s) Inhalation every 6 hours  amLODIPine   Tablet 10 milliGRAM(s) Oral daily  atorvastatin 20 milliGRAM(s) Oral at bedtime  carvedilol 3.125 milliGRAM(s) Oral every 12 hours  chlorhexidine 2% Cloths 1 Application(s) Topical daily  cholecalciferol 1000 Unit(s) Oral daily  epoetin beverley-epbx (RETACRIT) Injectable 4000 Unit(s) IV Push <User Schedule>  ferrous    sulfate 325 milliGRAM(s) Oral daily  folic acid 1 milliGRAM(s) Oral daily  glucagon  Injectable 1 milliGRAM(s) IntraMuscular once  heparin   Injectable 5000 Unit(s) SubCutaneous every 12 hours  hydrALAZINE 25 milliGRAM(s) Oral three times a day  melatonin 3 milliGRAM(s) Oral at bedtime PRN  metoclopramide 5 milliGRAM(s) Oral three times a day  multivitamin 1 Tablet(s) Oral daily  nortriptyline 10 milliGRAM(s) Oral at bedtime  ondansetron Injectable 4 milliGRAM(s) IV Push every 8 hours PRN  pantoprazole    Tablet 40 milliGRAM(s) Oral before breakfast  senna 2 Tablet(s) Oral at bedtime  sevelamer carbonate 2400 milliGRAM(s) Oral three times a day with meals  sodium zirconium cyclosilicate 10 Gram(s) Oral daily  sucralfate suspension 1 Gram(s) Oral four times a day  zolpidem 5 milliGRAM(s) Oral at bedtime PRN          Hemoglobin: 8.0 g/dL (02-22 @ 11:47)  Hemoglobin: 9.8 g/dL (02-22 @ 00:58)            Creatinine Trend: 10.20<--, 9.57<--    COAGS:     CARDIAC MARKERS ( 22 Feb 2022 11:47 )  x     / x     / 105 U/L / x     / 5.7 ng/mL        T(C): 36.2 (02-24-22 @ 04:21), Max: 36.9 (02-23-22 @ 15:30)  HR: 88 (02-24-22 @ 04:21) (88 - 95)  BP: 156/82 (02-24-22 @ 04:21) (156/82 - 167/89)  RR: 18 (02-24-22 @ 04:21) (17 - 18)  SpO2: 95% (02-24-22 @ 04:21) (95% - 95%)  Wt(kg): --    I&O's Summary    GEN: L BKA  HEENT:  (-)icterus (-)pallor  CV: N S1 S2 1/6 DIANA (+)2 Pulses B/l  Resp:  Clear to ausculatation B/L, normal effort  GI: (+) BS Soft, NT, ND  Lymph:  (-)Edema, (-)obvious lymphadenopathy  Skin: Warm to touch, Normal turgor  Psych: Appropriate mood and affect          ASSESSMENT/PLAN: 	61y  Male  [facility] with a significant medical history of HTN, Anemia, CAD, HLD, T2DM, GERD, Adrenal Insuffiencey, Left BKA, prior GI Bleed, and ESRD on HD (TTS) was brought to the ED for complaints of shortness of breath x 1 day.    - borderline elevated trop in the setting opr renal disease of unclear clinical significance.  Presentation is inconsitent with ACS  - prelim echo with preserved LV function  - awaiting stress, delayed for no IV access   - HD per renal  - cont  Lipitor 20 QHS goal LDL <70  - cont Coreg 3.125 mg PO BID for BP control and suspected diastolic dysfunction  - off ASA due to history of GI bleeds      Dominic Still MD, [facility]  BEEPER [phone]

## 2022-02-24 NOTE — PROGRESS NOTE ADULT - SUBJECTIVE AND OBJECTIVE BOX
Time of Visit:  Patient seen and examined.     MEDICATIONS  (STANDING):  ALBUTerol    90 MICROgram(s) HFA Inhaler 2 Puff(s) Inhalation every 6 hours  amLODIPine   Tablet 10 milliGRAM(s) Oral daily  atorvastatin 20 milliGRAM(s) Oral at bedtime  carvedilol 3.125 milliGRAM(s) Oral every 12 hours  chlorhexidine 2% Cloths 1 Application(s) Topical daily  cholecalciferol 1000 Unit(s) Oral daily  epoetin beverley-epbx (RETACRIT) Injectable 4000 Unit(s) IV Push <User Schedule>  ferrous    sulfate 325 milliGRAM(s) Oral daily  folic acid 1 milliGRAM(s) Oral daily  glucagon  Injectable 1 milliGRAM(s) IntraMuscular once  heparin   Injectable 5000 Unit(s) SubCutaneous every 12 hours  hydrALAZINE 25 milliGRAM(s) Oral three times a day  metoclopramide 5 milliGRAM(s) Oral three times a day  multivitamin 1 Tablet(s) Oral daily  nortriptyline 10 milliGRAM(s) Oral at bedtime  pantoprazole    Tablet 40 milliGRAM(s) Oral before breakfast  senna 2 Tablet(s) Oral at bedtime  sevelamer carbonate 2400 milliGRAM(s) Oral three times a day with meals  sodium zirconium cyclosilicate 10 Gram(s) Oral daily  sucralfate suspension 1 Gram(s) Oral four times a day      MEDICATIONS  (PRN):  acetaminophen     Tablet .. 650 milliGRAM(s) Oral every 6 hours PRN Temp greater or equal to 38C (100.4F), Mild Pain (1 - 3)  melatonin 3 milliGRAM(s) Oral at bedtime PRN Insomnia  ondansetron Injectable 4 milliGRAM(s) IV Push every 8 hours PRN Nausea and/or Vomiting  zolpidem 5 milliGRAM(s) Oral at bedtime PRN Insomnia       Medications up to date at time of exam.    ROS; No fever, chills, cough, congestion on exam.   PHYSICAL EXAMINATION:    Vital Signs Last 24 Hrs  T(C): 36.9 (24 Feb 2022 13:08), Max: 36.9 (23 Feb 2022 15:30)  T(F): 98.5 (24 Feb 2022 13:08), Max: 98.5 (23 Feb 2022 15:30)  HR: 79 (24 Feb 2022 13:08) (79 - 95)  BP: 130/69 (24 Feb 2022 13:08) (130/69 - 167/89)  BP(mean): --  RR: 17 (24 Feb 2022 13:08) (17 - 18)  SpO2: 97% (24 Feb 2022 13:08) (95% - 97%)   (if applicable)    General ; Alert and oriented. No acute distress.     HEENT: Head is normocephalic and atraumatic. No nasal tenderness. Mucous membranes are moist.     NECK: Supple, no palpable adenopathy.    LUNGS: Clear to auscultation bilaterally with no wheezing, rales, or rhonchi. No use of accessory muscle.      HEART: S1 S2 Regular rate and no click/ rub.     ABDOMEN: Soft, nontender, and nondistended. Active bowel sounds.     : No bladder distention and tenderness .      NEUROLOGIC: Awake, alert, oriented.     SKIN: Warm, dry, good turgor.      LABS:                      Serum Pro-Brain Natriuretic Peptide: 72683 pg/mL (02-22-22 @ 00:58)          MICROBIOLOGY: (if applicable)    RADIOLOGY & ADDITIONAL STUDIES:  EKG:   CXR:  ECHO:    IMPRESSION: 61y Male PAST MEDICAL & SURGICAL HISTORY:  Hypertension    Adrenal insufficiency    Anemia    Glaucoma    Coronary artery disease    HLD (hyperlipidemia)    Peripheral vascular disease    Spinal stenosis of lumbosacral region    Hyperparathyroidism    Diabetes mellitus    Diabetic neuropathy    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    Osteoarthritis    Vision loss of left eye    ESRD on hemodialysis    Cataract  both eyes - hx of sx done    BPH (benign prostatic hyperplasia)    UTI (urinary tract infection)  hx of    Bladder mass  hx of    H/O hematuria    Osteoporosis    Vision loss of right eye    Depression    Chronic GERD    Osteomyelitis of vertebra    Below knee amputation status, left  2012- pt is wearing prostesis    History of right cataract extraction    History of left cataract extraction    S/P arteriovenous (AV) fistula creation  right arm brachiocephalic arteriovenous fistula on 11/08/2018    H/O hematuria  s/p bladder bx and fulguration 2/25/2020    H/O transurethral destruction of bladder lesion  2020    History of excision of mass  back mass on 03/31/2021    Impression; This is a 62 Y/O Female from [facility] presented to ED with shortness of breath x 1 day. Admitted with Acute Hypoxic Respiratory Failure due to Pulmonary Edema from Fluid Overload. 02-22-22 Negative PCR for Covid 19.      Suggestion :  O2 saturation 96% room air. So far saturating good room air. Hypoxia improved .  Continue Albuterol 90 mcg 2 Puffs Q 6 Hours.    Continue dialysis as per Neph  Pulmonary oral hygiene care.   DVT/ GI prophylactic.  Time of Visit:  Patient seen and examined.     MEDICATIONS  (STANDING):  ALBUTerol    90 MICROgram(s) HFA Inhaler 2 Puff(s) Inhalation every 6 hours  amLODIPine   Tablet 10 milliGRAM(s) Oral daily  atorvastatin 20 milliGRAM(s) Oral at bedtime  carvedilol 3.125 milliGRAM(s) Oral every 12 hours  chlorhexidine 2% Cloths 1 Application(s) Topical daily  cholecalciferol 1000 Unit(s) Oral daily  epoetin beverley-epbx (RETACRIT) Injectable 4000 Unit(s) IV Push <User Schedule>  ferrous    sulfate 325 milliGRAM(s) Oral daily  folic acid 1 milliGRAM(s) Oral daily  glucagon  Injectable 1 milliGRAM(s) IntraMuscular once  heparin   Injectable 5000 Unit(s) SubCutaneous every 12 hours  hydrALAZINE 25 milliGRAM(s) Oral three times a day  metoclopramide 5 milliGRAM(s) Oral three times a day  multivitamin 1 Tablet(s) Oral daily  nortriptyline 10 milliGRAM(s) Oral at bedtime  pantoprazole    Tablet 40 milliGRAM(s) Oral before breakfast  senna 2 Tablet(s) Oral at bedtime  sevelamer carbonate 2400 milliGRAM(s) Oral three times a day with meals  sodium zirconium cyclosilicate 10 Gram(s) Oral daily  sucralfate suspension 1 Gram(s) Oral four times a day      MEDICATIONS  (PRN):  acetaminophen     Tablet .. 650 milliGRAM(s) Oral every 6 hours PRN Temp greater or equal to 38C (100.4F), Mild Pain (1 - 3)  melatonin 3 milliGRAM(s) Oral at bedtime PRN Insomnia  ondansetron Injectable 4 milliGRAM(s) IV Push every 8 hours PRN Nausea and/or Vomiting  zolpidem 5 milliGRAM(s) Oral at bedtime PRN Insomnia       Medications up to date at time of exam.    ROS; No fever, chills, cough, congestion on exam.   PHYSICAL EXAMINATION:    Vital Signs Last 24 Hrs  T(C): 36.9 (24 Feb 2022 13:08), Max: 36.9 (23 Feb 2022 15:30)  T(F): 98.5 (24 Feb 2022 13:08), Max: 98.5 (23 Feb 2022 15:30)  HR: 79 (24 Feb 2022 13:08) (79 - 95)  BP: 130/69 (24 Feb 2022 13:08) (130/69 - 167/89)  BP(mean): --  RR: 17 (24 Feb 2022 13:08) (17 - 18)  SpO2: 97% (24 Feb 2022 13:08) (95% - 97%)   (if applicable)    General ; Alert and oriented. No acute distress.     HEENT: Head is normocephalic and atraumatic. No nasal tenderness. Mucous membranes are moist.     NECK: Supple, no palpable adenopathy.    LUNGS: Clear to auscultation bilaterally with no wheezing, rales, or rhonchi. No use of accessory muscle.      HEART: S1 S2 Regular rate and no click/ rub.     ABDOMEN: Soft, nontender, and nondistended. Active bowel sounds.     : No bladder distention and tenderness .      NEUROLOGIC: Awake, alert, oriented.     SKIN: Warm, dry, good turgor.      LABS:                      Serum Pro-Brain Natriuretic Peptide: 03083 pg/mL (02-22-22 @ 00:58)          MICROBIOLOGY: (if applicable)    RADIOLOGY & ADDITIONAL STUDIES:  EKG:   CXR:  ECHO:    IMPRESSION: 61y Male PAST MEDICAL & SURGICAL HISTORY:  Hypertension    Adrenal insufficiency    Anemia    Glaucoma    Coronary artery disease    HLD (hyperlipidemia)    Peripheral vascular disease    Spinal stenosis of lumbosacral region    Hyperparathyroidism    Diabetes mellitus    Diabetic neuropathy    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    Osteoarthritis    Vision loss of left eye    ESRD on hemodialysis    Cataract  both eyes - hx of sx done    BPH (benign prostatic hyperplasia)    UTI (urinary tract infection)  hx of    Bladder mass  hx of    H/O hematuria    Osteoporosis    Vision loss of right eye    Depression    Chronic GERD    Osteomyelitis of vertebra    Below knee amputation status, left  2012- pt is wearing prostesis    History of right cataract extraction    History of left cataract extraction    S/P arteriovenous (AV) fistula creation  right arm brachiocephalic arteriovenous fistula on 11/08/2018    H/O hematuria  s/p bladder bx and fulguration 2/25/2020    H/O transurethral destruction of bladder lesion  2020    History of excision of mass  back mass on 03/31/2021    Impression; This is a 62 Y/O Female from [facility] presented to ED with shortness of breath x 1 day. Admitted with Acute Hypoxic Respiratory Failure due to Pulmonary Edema from Fluid Overload. 02-22-22 Negative PCR for Covid 19.      Suggestion :  O2 saturation 96% room air. So far saturating good room air. Hypoxia improved .  Continue Albuterol 90 mcg 2 Puffs Q 6 Hours.    Continue dialysis as per Neph  Pulmonary oral hygiene care.   DVT/ GI prophylactic.     Agree with above assessment and plan as transcribed.

## 2022-02-24 NOTE — DISCHARGE NOTE NURSING/CASE MANAGEMENT/SOCIAL WORK - PATIENT PORTAL LINK FT
You can access the FollowMyHealth Patient Portal offered by [facility] by registering at the following website: [email or web address]. By joining [facility]’s FollowMyHealth portal, you will also be able to view your health information using other applications (apps) compatible with our system.

## 2022-02-24 NOTE — PROGRESS NOTE ADULT - SUBJECTIVE AND OBJECTIVE BOX
[facility] : Progress Note :: [phone], (office [phone]),   Dr Mosher / Dr Washington / Dr Young / Dr Doll / Dr Varsha TURCIOS / Dr Tello / Dr Garcia / Dr Larry qiu  _____________________________________________________________________________________________    Stress test not done, patient had lunch    fish (Rash)  liver (Anaphylaxis)  No Known Drug Allergies    Hospital Medications:   MEDICATIONS  (STANDING):  ALBUTerol    90 MICROgram(s) HFA Inhaler 2 Puff(s) Inhalation every 6 hours  amLODIPine   Tablet 10 milliGRAM(s) Oral daily  atorvastatin 20 milliGRAM(s) Oral at bedtime  carvedilol 3.125 milliGRAM(s) Oral every 12 hours  chlorhexidine 2% Cloths 1 Application(s) Topical daily  cholecalciferol 1000 Unit(s) Oral daily  epoetin beverley-epbx (RETACRIT) Injectable 4000 Unit(s) IV Push <User Schedule>  ferrous    sulfate 325 milliGRAM(s) Oral daily  folic acid 1 milliGRAM(s) Oral daily  glucagon  Injectable 1 milliGRAM(s) IntraMuscular once  heparin   Injectable 5000 Unit(s) SubCutaneous every 12 hours  hydrALAZINE 25 milliGRAM(s) Oral three times a day  metoclopramide 5 milliGRAM(s) Oral three times a day  multivitamin 1 Tablet(s) Oral daily  nortriptyline 10 milliGRAM(s) Oral at bedtime  pantoprazole    Tablet 40 milliGRAM(s) Oral before breakfast  senna 2 Tablet(s) Oral at bedtime  sevelamer carbonate 2400 milliGRAM(s) Oral three times a day with meals  sodium zirconium cyclosilicate 10 Gram(s) Oral daily  sucralfate suspension 1 Gram(s) Oral four times a day        VITALS:  T(F): 97.2 (02-24-22 @ 04:21), Max: 98.5 (02-23-22 @ 15:30)  HR: 88 (02-24-22 @ 04:21)  BP: 156/82 (02-24-22 @ 04:21)  RR: 18 (02-24-22 @ 04:21)  SpO2: 95% (02-24-22 @ 04:21)  Wt(kg): --      PHYSICAL EXAM:  Constitutional: NAD  HEENT: anicteric sclera, oropharynx clear  Neck: No JVD  Respiratory: CTAB, no wheezes, rales or rhonchi  Cardiovascular: S1, S2, RRR  Gastrointestinal: BS+, soft, NT/ND  Extremities:  No peripheral edema  Neurological: A/O x 3, no focal deficits  : No CVA tenderness. No [place].   Vascular Access: AVF     LABS:        Creatinine Trend: 10.20 <--, 9.57 <--    Urine Studies:      RADIOLOGY & ADDITIONAL STUDIES:

## 2022-02-24 NOTE — PROGRESS NOTE ADULT - ASSESSMENT
Patient is a 61y Male whom presented to the hospital with shortness of breath for a day.  Has ESRD on HD at [facility]. He is non-compliant with HD prescription ( cuts time and misses treatments). also with dietary indiscretions.  In ED had pulmonary edema and hypoxia      # ESRD admitted with pulmonary edema. s/p hemodialysis on Tuesday. HD today   Awaits stress test per cardiology.  Discussed compliance with HD prescription  # anemia of CKD. retacrit given on HD   # CKDMBD. CONT SEVELAMER  # HTN: BP stable 
Patient is a 61y Male whom presented to the hospital with shortness of breath for a day.  Has ESRD on HD at [facility]. He is non-compliant with HD prescription ( cuts time and misses treatments). also with dietary indiscretions.  In ED had pulmonary edema and hypoxia  s/p  HD yesterday and feels better.    # ESRD admitted with pulmonary edema. s/p hemodialysis yesterday with significant improvement of symptoms.  Discussed compliance with HD prescription  # anemia of CKD. retacrit given on HD   # CKDMBD. CONT SEVELAMER  HD in AM after stress testing
60 y/o male from [facility] with a past medical history of hypertension, anemia, CAD, hyperlipidemia, diabetes mellitus, GERD and adrenal insuffiencey, ESRD on HD T,Th,S presented with co vomiting and SOB, sp missed HD session  In ED blood pressure 189/98, hypoxic , BNP 81,328, Troponin 86.1   CXR with signs of fluid overload and worsening left sided effusion  Placed on 12 L NRB   Admitted for acute respiratory failure, acute on chronic pulmonary edema and hypertensive emergency   Cardiology, pulmonology consulted   Pt refused telemetry monitoring, blood work, vital signs monitoring and TTE   Pt was counselled at length on the importance of completion of medical evaluation and treatment and compliance with HD and medication  Nephrology followed,  pt underwent HD session with significant improvement of symptoms. Oxygen was titrated down and oxygen saturation remains above 92% on RA   Nuclear stress test recommended by cardiology, pt initially refused, now agreed for stress test in am 2/24   Discharge planning for tomorrow after stress test and HD

## 2022-02-24 NOTE — DISCHARGE NOTE NURSING/CASE MANAGEMENT/SOCIAL WORK - NSDCPEFALRISK_GEN_ALL_CORE
For information on Fall & Injury Prevention, visit: [email or web address] OR  [email or web address] OR  https://www.cdc.gov/steadi/patient.html

## 2022-02-25 LAB
MRSA PCR RESULT.: SIGNIFICANT CHANGE UP
S AUREUS DNA NOSE QL NAA+PROBE: SIGNIFICANT CHANGE UP

## 2022-02-28 NOTE — H&P PST ADULT - SKIN COMMENTS
normal appearance , without tenderness upon palpation , no deformities , trachea midline , Thyroid normal size , no thyroid nodules , no masses , no JVD , thyroid nontender lower back mass 0.5 cm between buttock on sacral area, no redness,, mo drainage, no tender to palpation

## 2022-03-02 ENCOUNTER — APPOINTMENT (OUTPATIENT)
Dept: DERMATOLOGY | Facility: CLINIC | Age: 62
End: 2022-03-02

## 2022-03-16 ENCOUNTER — INPATIENT (INPATIENT)
Facility: HOSPITAL | Age: 62
LOS: 1 days | Discharge: NURSING, REHAB OR LONG-TERM CARE | DRG: 291 | End: 2022-03-18
Attending: INTERNAL MEDICINE | Admitting: INTERNAL MEDICINE
Payer: MEDICAID

## 2022-03-16 VITALS
DIASTOLIC BLOOD PRESSURE: 105 MMHG | HEART RATE: 109 BPM | TEMPERATURE: 98 F | HEIGHT: 66 IN | WEIGHT: 126.99 LBS | SYSTOLIC BLOOD PRESSURE: 197 MMHG | RESPIRATION RATE: 20 BRPM | OXYGEN SATURATION: 100 %

## 2022-03-16 DIAGNOSIS — R06.02 SHORTNESS OF BREATH: ICD-10-CM

## 2022-03-16 DIAGNOSIS — Z98.890 OTHER SPECIFIED POSTPROCEDURAL STATES: Chronic | ICD-10-CM

## 2022-03-16 DIAGNOSIS — Z98.41 CATARACT EXTRACTION STATUS, RIGHT EYE: Chronic | ICD-10-CM

## 2022-03-16 DIAGNOSIS — D64.9 ANEMIA, UNSPECIFIED: ICD-10-CM

## 2022-03-16 DIAGNOSIS — E11.9 TYPE 2 DIABETES MELLITUS WITHOUT COMPLICATIONS: ICD-10-CM

## 2022-03-16 DIAGNOSIS — Z87.448 PERSONAL HISTORY OF OTHER DISEASES OF URINARY SYSTEM: Chronic | ICD-10-CM

## 2022-03-16 DIAGNOSIS — Z89.512 ACQUIRED ABSENCE OF LEFT LEG BELOW KNEE: Chronic | ICD-10-CM

## 2022-03-16 DIAGNOSIS — N18.6 END STAGE RENAL DISEASE: ICD-10-CM

## 2022-03-16 DIAGNOSIS — R06.00 DYSPNEA, UNSPECIFIED: ICD-10-CM

## 2022-03-16 DIAGNOSIS — Z98.42 CATARACT EXTRACTION STATUS, LEFT EYE: Chronic | ICD-10-CM

## 2022-03-16 DIAGNOSIS — Z29.9 ENCOUNTER FOR PROPHYLACTIC MEASURES, UNSPECIFIED: ICD-10-CM

## 2022-03-16 DIAGNOSIS — I10 ESSENTIAL (PRIMARY) HYPERTENSION: ICD-10-CM

## 2022-03-16 LAB
ALBUMIN SERPL ELPH-MCNC: 3 G/DL — LOW (ref 3.5–5)
ALP SERPL-CCNC: 80 U/L — SIGNIFICANT CHANGE UP (ref 40–120)
ALT FLD-CCNC: 19 U/L DA — SIGNIFICANT CHANGE UP (ref 10–60)
ANION GAP SERPL CALC-SCNC: 5 MMOL/L — SIGNIFICANT CHANGE UP (ref 5–17)
AST SERPL-CCNC: 28 U/L — SIGNIFICANT CHANGE UP (ref 10–40)
BASOPHILS # BLD AUTO: 0.02 K/UL — SIGNIFICANT CHANGE UP (ref 0–0.2)
BASOPHILS NFR BLD AUTO: 0.3 % — SIGNIFICANT CHANGE UP (ref 0–2)
BILIRUB SERPL-MCNC: 0.5 MG/DL — SIGNIFICANT CHANGE UP (ref 0.2–1.2)
BUN SERPL-MCNC: 21 MG/DL — HIGH (ref 7–18)
CALCIUM SERPL-MCNC: 8.2 MG/DL — LOW (ref 8.4–10.5)
CHLORIDE SERPL-SCNC: 104 MMOL/L — SIGNIFICANT CHANGE UP (ref 96–108)
CO2 SERPL-SCNC: 30 MMOL/L — SIGNIFICANT CHANGE UP (ref 22–31)
CREAT SERPL-MCNC: 7.47 MG/DL — HIGH (ref 0.5–1.3)
EGFR: 8 ML/MIN/1.73M2 — LOW
EOSINOPHIL # BLD AUTO: 0.06 K/UL — SIGNIFICANT CHANGE UP (ref 0–0.5)
EOSINOPHIL NFR BLD AUTO: 0.8 % — SIGNIFICANT CHANGE UP (ref 0–6)
GLUCOSE BLDC GLUCOMTR-MCNC: 112 MG/DL — HIGH (ref 70–99)
GLUCOSE BLDC GLUCOMTR-MCNC: 128 MG/DL — HIGH (ref 70–99)
GLUCOSE SERPL-MCNC: 133 MG/DL — HIGH (ref 70–99)
HCT VFR BLD CALC: 27.1 % — LOW (ref 39–50)
HGB BLD-MCNC: 8.7 G/DL — LOW (ref 13–17)
IMM GRANULOCYTES NFR BLD AUTO: 0.7 % — SIGNIFICANT CHANGE UP (ref 0–1.5)
LYMPHOCYTES # BLD AUTO: 0.39 K/UL — LOW (ref 1–3.3)
LYMPHOCYTES # BLD AUTO: 5.4 % — LOW (ref 13–44)
MCHC RBC-ENTMCNC: 29.3 PG — SIGNIFICANT CHANGE UP (ref 27–34)
MCHC RBC-ENTMCNC: 32.1 GM/DL — SIGNIFICANT CHANGE UP (ref 32–36)
MCV RBC AUTO: 91.2 FL — SIGNIFICANT CHANGE UP (ref 80–100)
MONOCYTES # BLD AUTO: 0.28 K/UL — SIGNIFICANT CHANGE UP (ref 0–0.9)
MONOCYTES NFR BLD AUTO: 3.9 % — SIGNIFICANT CHANGE UP (ref 2–14)
NEUTROPHILS # BLD AUTO: 6.45 K/UL — SIGNIFICANT CHANGE UP (ref 1.8–7.4)
NEUTROPHILS NFR BLD AUTO: 88.9 % — HIGH (ref 43–77)
NRBC # BLD: 0 /100 WBCS — SIGNIFICANT CHANGE UP (ref 0–0)
NT-PROBNP SERPL-SCNC: HIGH PG/ML (ref 0–125)
PLATELET # BLD AUTO: 135 K/UL — LOW (ref 150–400)
POTASSIUM SERPL-MCNC: 5.1 MMOL/L — SIGNIFICANT CHANGE UP (ref 3.5–5.3)
POTASSIUM SERPL-SCNC: 5.1 MMOL/L — SIGNIFICANT CHANGE UP (ref 3.5–5.3)
PROT SERPL-MCNC: 6.4 G/DL — SIGNIFICANT CHANGE UP (ref 6–8.3)
RBC # BLD: 2.97 M/UL — LOW (ref 4.2–5.8)
RBC # FLD: 18.8 % — HIGH (ref 10.3–14.5)
SARS-COV-2 RNA SPEC QL NAA+PROBE: SIGNIFICANT CHANGE UP
SODIUM SERPL-SCNC: 139 MMOL/L — SIGNIFICANT CHANGE UP (ref 135–145)
TROPONIN I, HIGH SENSITIVITY RESULT: 145.7 NG/L — HIGH
TROPONIN I, HIGH SENSITIVITY RESULT: 187.7 NG/L — HIGH
WBC # BLD: 7.25 K/UL — SIGNIFICANT CHANGE UP (ref 3.8–10.5)
WBC # FLD AUTO: 7.25 K/UL — SIGNIFICANT CHANGE UP (ref 3.8–10.5)

## 2022-03-16 PROCEDURE — 99285 EMERGENCY DEPT VISIT HI MDM: CPT

## 2022-03-16 PROCEDURE — 93010 ELECTROCARDIOGRAM REPORT: CPT

## 2022-03-16 PROCEDURE — 71045 X-RAY EXAM CHEST 1 VIEW: CPT | Mod: 26

## 2022-03-16 RX ORDER — DEXTROSE 50 % IN WATER 50 %
12.5 SYRINGE (ML) INTRAVENOUS ONCE
Refills: 0 | Status: DISCONTINUED | OUTPATIENT
Start: 2022-03-16 | End: 2022-03-17

## 2022-03-16 RX ORDER — ZOLPIDEM TARTRATE 10 MG/1
5 TABLET ORAL AT BEDTIME
Refills: 0 | Status: DISCONTINUED | OUTPATIENT
Start: 2022-03-16 | End: 2022-03-18

## 2022-03-16 RX ORDER — SODIUM CHLORIDE 9 MG/ML
1000 INJECTION, SOLUTION INTRAVENOUS
Refills: 0 | Status: DISCONTINUED | OUTPATIENT
Start: 2022-03-16 | End: 2022-03-17

## 2022-03-16 RX ORDER — HYDRALAZINE HCL 50 MG
25 TABLET ORAL THREE TIMES A DAY
Refills: 0 | Status: DISCONTINUED | OUTPATIENT
Start: 2022-03-16 | End: 2022-03-18

## 2022-03-16 RX ORDER — OXYCODONE HYDROCHLORIDE 5 MG/1
5 TABLET ORAL
Refills: 0 | Status: DISCONTINUED | OUTPATIENT
Start: 2022-03-16 | End: 2022-03-18

## 2022-03-16 RX ORDER — FOLIC ACID 0.8 MG
1 TABLET ORAL DAILY
Refills: 0 | Status: DISCONTINUED | OUTPATIENT
Start: 2022-03-16 | End: 2022-03-18

## 2022-03-16 RX ORDER — TIOTROPIUM BROMIDE 18 UG/1
1 CAPSULE ORAL; RESPIRATORY (INHALATION) DAILY
Refills: 0 | Status: DISCONTINUED | OUTPATIENT
Start: 2022-03-16 | End: 2022-03-18

## 2022-03-16 RX ORDER — DIPHENHYDRAMINE HCL 50 MG
25 CAPSULE ORAL AT BEDTIME
Refills: 0 | Status: DISCONTINUED | OUTPATIENT
Start: 2022-03-16 | End: 2022-03-18

## 2022-03-16 RX ORDER — GLUCAGON INJECTION, SOLUTION 0.5 MG/.1ML
1 INJECTION, SOLUTION SUBCUTANEOUS ONCE
Refills: 0 | Status: DISCONTINUED | OUTPATIENT
Start: 2022-03-16 | End: 2022-03-17

## 2022-03-16 RX ORDER — CHOLECALCIFEROL (VITAMIN D3) 125 MCG
1000 CAPSULE ORAL DAILY
Refills: 0 | Status: DISCONTINUED | OUTPATIENT
Start: 2022-03-16 | End: 2022-03-18

## 2022-03-16 RX ORDER — FERROUS SULFATE 325(65) MG
325 TABLET ORAL
Refills: 0 | Status: DISCONTINUED | OUTPATIENT
Start: 2022-03-16 | End: 2022-03-18

## 2022-03-16 RX ORDER — INSULIN LISPRO 100/ML
VIAL (ML) SUBCUTANEOUS
Refills: 0 | Status: DISCONTINUED | OUTPATIENT
Start: 2022-03-16 | End: 2022-03-18

## 2022-03-16 RX ORDER — LANOLIN ALCOHOL/MO/W.PET/CERES
3 CREAM (GRAM) TOPICAL AT BEDTIME
Refills: 0 | Status: DISCONTINUED | OUTPATIENT
Start: 2022-03-16 | End: 2022-03-18

## 2022-03-16 RX ORDER — DEXTROSE 50 % IN WATER 50 %
25 SYRINGE (ML) INTRAVENOUS ONCE
Refills: 0 | Status: DISCONTINUED | OUTPATIENT
Start: 2022-03-16 | End: 2022-03-17

## 2022-03-16 RX ORDER — AMLODIPINE BESYLATE 2.5 MG/1
10 TABLET ORAL DAILY
Refills: 0 | Status: DISCONTINUED | OUTPATIENT
Start: 2022-03-16 | End: 2022-03-18

## 2022-03-16 RX ORDER — FAMOTIDINE 10 MG/ML
20 INJECTION INTRAVENOUS
Refills: 0 | Status: DISCONTINUED | OUTPATIENT
Start: 2022-03-16 | End: 2022-03-18

## 2022-03-16 RX ORDER — ASPIRIN/CALCIUM CARB/MAGNESIUM 324 MG
81 TABLET ORAL DAILY
Refills: 0 | Status: DISCONTINUED | OUTPATIENT
Start: 2022-03-16 | End: 2022-03-18

## 2022-03-16 RX ORDER — DEXTROSE 50 % IN WATER 50 %
15 SYRINGE (ML) INTRAVENOUS ONCE
Refills: 0 | Status: DISCONTINUED | OUTPATIENT
Start: 2022-03-16 | End: 2022-03-17

## 2022-03-16 RX ORDER — METOCLOPRAMIDE HCL 10 MG
10 TABLET ORAL THREE TIMES A DAY
Refills: 0 | Status: DISCONTINUED | OUTPATIENT
Start: 2022-03-16 | End: 2022-03-18

## 2022-03-16 RX ORDER — ERYTHROPOIETIN 10000 [IU]/ML
10000 INJECTION, SOLUTION INTRAVENOUS; SUBCUTANEOUS ONCE
Refills: 0 | Status: COMPLETED | OUTPATIENT
Start: 2022-03-16 | End: 2022-03-16

## 2022-03-16 RX ORDER — SEVELAMER CARBONATE 2400 MG/1
800 POWDER, FOR SUSPENSION ORAL
Refills: 0 | Status: DISCONTINUED | OUTPATIENT
Start: 2022-03-16 | End: 2022-03-18

## 2022-03-16 RX ORDER — HEPARIN SODIUM 5000 [USP'U]/ML
5000 INJECTION INTRAVENOUS; SUBCUTANEOUS EVERY 8 HOURS
Refills: 0 | Status: DISCONTINUED | OUTPATIENT
Start: 2022-03-16 | End: 2022-03-18

## 2022-03-16 RX ORDER — NORTRIPTYLINE HYDROCHLORIDE 10 MG/1
10 CAPSULE ORAL AT BEDTIME
Refills: 0 | Status: DISCONTINUED | OUTPATIENT
Start: 2022-03-16 | End: 2022-03-18

## 2022-03-16 RX ORDER — SIMVASTATIN 20 MG/1
40 TABLET, FILM COATED ORAL AT BEDTIME
Refills: 0 | Status: DISCONTINUED | OUTPATIENT
Start: 2022-03-16 | End: 2022-03-18

## 2022-03-16 RX ORDER — SODIUM ZIRCONIUM CYCLOSILICATE 10 G/10G
10 POWDER, FOR SUSPENSION ORAL DAILY
Refills: 0 | Status: DISCONTINUED | OUTPATIENT
Start: 2022-03-16 | End: 2022-03-18

## 2022-03-16 RX ORDER — ALBUTEROL 90 UG/1
2 AEROSOL, METERED ORAL EVERY 6 HOURS
Refills: 0 | Status: DISCONTINUED | OUTPATIENT
Start: 2022-03-16 | End: 2022-03-18

## 2022-03-16 RX ADMIN — SIMVASTATIN 40 MILLIGRAM(S): 20 TABLET, FILM COATED ORAL at 23:05

## 2022-03-16 RX ADMIN — OXYCODONE HYDROCHLORIDE 5 MILLIGRAM(S): 5 TABLET ORAL at 19:07

## 2022-03-16 RX ADMIN — Medication 3 MILLIGRAM(S): at 23:09

## 2022-03-16 RX ADMIN — HEPARIN SODIUM 5000 UNIT(S): 5000 INJECTION INTRAVENOUS; SUBCUTANEOUS at 23:04

## 2022-03-16 RX ADMIN — Medication 25 MILLIGRAM(S): at 23:05

## 2022-03-16 RX ADMIN — Medication 10 MILLIGRAM(S): at 23:25

## 2022-03-16 RX ADMIN — NORTRIPTYLINE HYDROCHLORIDE 10 MILLIGRAM(S): 10 CAPSULE ORAL at 23:06

## 2022-03-16 RX ADMIN — OXYCODONE HYDROCHLORIDE 5 MILLIGRAM(S): 5 TABLET ORAL at 18:33

## 2022-03-16 RX ADMIN — ERYTHROPOIETIN 10000 UNIT(S): 10000 INJECTION, SOLUTION INTRAVENOUS; SUBCUTANEOUS at 11:19

## 2022-03-16 NOTE — ED ADULT NURSE NOTE - CHIEF COMPLAINT QUOTE
CHA from [facility] for SOB. As per EMS, patient was saturating 75% on room air. Currently on NRM 15 L, saturating 100%.

## 2022-03-16 NOTE — ED PROVIDER NOTE - ENMT, MLM
None known
Airway patent, Nasal mucosa clear. Mouth with normal mucosa. Throat has no vesicles, no oropharyngeal exudates and uvula is midline.

## 2022-03-16 NOTE — H&P ADULT - NSHPLABSRESULTS_GEN_ALL_CORE
LABS:                        8.7    7.25  )-----------( 135      ( 16 Mar 2022 05:15 )             27.1     03-16    139  |  104  |  21<H>  ----------------------------<  133<H>  5.1   |  30  |  7.47<H>    Ca    8.2<L>      16 Mar 2022 05:14    TPro  6.4  /  Alb  3.0<L>  /  TBili  0.5  /  DBili  x   /  AST  28  /  ALT  19  /  AlkPhos  80  03-16    LIVER FUNCTIONS - ( 16 Mar 2022 05:14 )  Alb: 3.0 g/dL / Pro: 6.4 g/dL / ALK PHOS: 80 U/L / ALT: 19 U/L DA / AST: 28 U/L / GGT: x    CXR with mild interstitial fluid in b/l lung bases (wet read)  EKG NSR HR 95, LVH, TWI V1-2, early take off V4-6 LABS:                        8.7    7.25  )-----------( 135      ( 16 Mar 2022 05:15 )             27.1     03-16    139  |  104  |  21<H>  ----------------------------<  133<H>  5.1   |  30  |  7.47<H>    Ca    8.2<L>      16 Mar 2022 05:14    TPro  6.4  /  Alb  3.0<L>  /  TBili  0.5  /  DBili  x   /  AST  28  /  ALT  19  /  AlkPhos  80  03-16    LIVER FUNCTIONS - ( 16 Mar 2022 05:14 )  Alb: 3.0 g/dL / Pro: 6.4 g/dL / ALK PHOS: 80 U/L / ALT: 19 U/L DA / AST: 28 U/L / GGT: x    CXR with mild interstitial fluid in b/l lung bases (wet read)    EKG NSR HR 95, LVH, TWI V1-2, early take off V4-6

## 2022-03-16 NOTE — ED ADULT NURSE NOTE - ED STAT RN HANDOFF DETAILS
Report given to RN Love. Pt resting in bed, no acute distress noted, denies chest pain, no shortness of breath indicated.

## 2022-03-16 NOTE — ED ADULT TRIAGE NOTE - CHIEF COMPLAINT QUOTE
CHA from [facility] for SOB. As per EMS, patient was saturating 75% on room air. Currently on NRM 15 L, saturating 100% CHA from [facility] for SOB. As per EMS, patient was saturating 75% on room air. Currently on NRM 15 L, saturating 100%.

## 2022-03-16 NOTE — H&P ADULT - NSHPPHYSICALEXAM_GEN_ALL_CORE
PHYSICAL EXAMINATION:  GENERAL: NAD, well built, comfortable 100% on RA but patient requests NR for comfort  HEAD:  Atraumatic, Normocephalic  EYES:  conjunctiva and sclera s/p pseudophakia  NECK: Supple, No JVD  CHEST/LUNG: scant crackles at bases; No rales, rhonchi, wheezing, or rubs  HEART: Regular rate and rhythm; No murmurs, rubs, or gallops  ABDOMEN: Soft, Nontender, Nondistended; Bowel sounds present  NERVOUS SYSTEM:  Alert & Oriented, follows commands, moving all extremities willfully and equally. L BKA  SKIN: warm dry

## 2022-03-16 NOTE — H&P ADULT - HISTORY OF PRESENT ILLNESS
60 yo M from [facility], Legally Blind 2/2 diabetic retinopathy, ESRD on HD Tue/Tr/Sat, CAD s/p MI, HLD, CHF, spinal stenosis, hyperparathyroidism, HTN, peripheral neuropathy, smoker's bronchitis and IDDM who was BIB EMS from [facility] for complaints of shortness of breath. Ms. Lassiter states his symptoms began in the evening with gradual onset and are relieved with oxygen. Is able to tolerate lying flat. Denies any cough, sputum, fever or chest pain. Reports compliance with his medications and has not missed HD, last HD Tue 3/15.

## 2022-03-16 NOTE — ED ADULT NURSE NOTE - OBJECTIVE STATEMENT
62 yo male BIBA from [facility], on non-rebreather mask at 15 L/min., for shortness of breath. Patient's oxygen saturation on room air was 75%. AV fistula noted on right upper arm.

## 2022-03-16 NOTE — H&P ADULT - ASSESSMENT
60 yo M from [facility], Legally Blind 2/2 diabetic retinopathy, ESRD on HD Tue/Tr/Sat, CAD s/p MI, HLD, CHF, spinal stenosis, hyperparathyroidism, HTN, peripheral neuropathy, smoker's bronchitis and IDDM who BIB EMS from [facility] for SOB. Admit for HD. 62 yo M from [facility], Legally Blind 2/2 diabetic retinopathy, ESRD on HD Tue/Tr/Sat, CAD s/p MI, HLD, CHF, spinal stenosis, hyperparathyroidism, HTN, peripheral neuropathy, smoker's bronchitis and IDDM who BIB EMS from [facility] for SOB. Admit for HD. Dr. Mosher consulted.

## 2022-03-16 NOTE — ED ADULT NURSE REASSESSMENT NOTE - NS ED NURSE REASSESS COMMENT FT1
MADELEINE Acuna as well as MD Houston aware pt refusing scheduled medications and cardiac monitoring. MD Acuna to come speak to pt. No acute distress noted, denies chest pain, no shortness of breath indicated. Will continue to monitor.

## 2022-03-16 NOTE — H&P ADULT - NSHPREVIEWOFSYSTEMS_GEN_ALL_CORE
REVIEW OF SYSTEMS:  CONSTITUTIONAL: No fever, weight loss, or fatigue  RESPIRATORY: No cough, wheezing, chills or hemoptysis; + shortness of breath  CARDIOVASCULAR: No chest pain, palpitations, dizziness, or leg swelling  GASTROINTESTINAL: No abdominal pain. No nausea, vomiting, or hematemesis; No diarrhea or constipation. No melena or hematochezia.  GENITOURINARY: No dysuria or hematuria, urinary frequency  NEUROLOGICAL: No headaches, memory loss, loss of strength, numbness, or tremors  SKIN: No itching, burning, rashes, or lesions

## 2022-03-16 NOTE — H&P ADULT - ATTENDING COMMENTS
PATIENT WAS EVALUATED FOR    - FLUID OVER LOAD / SYSTOLIC CHF AND ESRD ON HD, HYPOXIC RESPIRATORY FAILURE  - PATIENT HAD BEEN MISSING HD - COUNSELLED TO GET HD REGULARLY, O2 SUPPLEMENTS AS NEEDED   - ESRD ON HD   - ANEMIA OF CKD     - GI AND DVT PROPHYLAXIS    - DR. COLIN MAC

## 2022-03-16 NOTE — PROGRESS NOTE ADULT - SUBJECTIVE AND OBJECTIVE BOX
[facility] : Progress Note :: [phone], (office [phone]),   Dr Mosher / Dr Washington / Dr Young / Dr Doll / Dr Varsha TURCIOS / Dr Tello / Dr Garcia / Dr Larry qiu  _____________________________________________________________________________________________    Patient is a 61y Male whom presented to the hospital with shortness of breath   Has ESRD on HD at [facility]  He is non-compliant with HD prescription ( cuts time and misses treatments), however had dialysis yesterday.  not compliant with dietary salt and fluid intake   In ED noted with  pulmonary edema and hypoxia  enal consulte for ESD    PAST MEDICAL & SURGICAL HISTORY:  Hypertension    Adrenal insufficiency    Anemia    Glaucoma    Coronary artery disease    HLD (hyperlipidemia)    Peripheral vascular disease    Spinal stenosis of lumbosacral region    Hyperparathyroidism    Diabetes mellitus    Diabetic neuropathy    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    Osteoarthritis    Vision loss of left eye    ESRD on hemodialysis    Cataract  both eyes - hx of sx done    BPH (benign prostatic hyperplasia)    UTI (urinary tract infection)  hx of    Bladder mass  hx of    H/O hematuria    Osteoporosis    Vision loss of right eye    Depression    Chronic GERD    Osteomyelitis of vertebra    Below knee amputation status, left  2012- pt is wearing prostesis    History of right cataract extraction    History of left cataract extraction    S/P arteriovenous (AV) fistula creation  right arm brachiocephalic arteriovenous fistula on 11/08/2018    H/O hematuria  s/p bladder bx and fulguration 2/25/2020    H/O transurethral destruction of bladder lesion  2020    History of excision of mass  back mass on 03/31/2021      fish (Rash)  liver (Anaphylaxis)  No Known Drug Allergies    Denies ETOh,Smoking,   FAMILY HISTORY:  Family history of cirrhosis of liver (Mother)    Family history of renal failure (Sibling)    Family history of hypertension (Sibling)    Family history of diabetes mellitus (Sibling)    History of substance abuse in sibling (Sibling)          VITALS:  /90      Height (cm): 167.6 (02-22 @ 00:07)  PHYSICAL EXAM:  Constitutional: NAD  HEENT: anicteric sclera, oropharynx clear, MMM  Neck: No JVD  Respiratory: CTAB, no wheezes, rales or rhonchi  Cardiovascular: S1, S2, RRR  Gastrointestinal: BS+, soft, NT/ND  Extremities:  peripheral edema+  Neurological: A/O x 3, no focal deficits  Vascular Access: AVF with thrill and bruit    LABS:    eviewed

## 2022-03-16 NOTE — H&P ADULT - PROBLEM SELECTOR PLAN 1
p/w SOB, saturating 100% on RA but prefers to keep NRBM on for comfort  BNP elevated 47447, Trop slight elevation 145.7  CXR with mild interstitial fluid in b/l lung bases (wet read)  EKG NSR HR 95, LVH, TWI V1-2, early take off V4-6  - admit for HD  - titrate off O2  - resume home meds & inhalers  NEPHRO Dr. Mosher p/w SOB, saturating 100% on RA but prefers to keep NRBM on for comfort  BNP elevated 22941, Trop slight elevation 145.7  CXR with mild interstitial fluid in b/l lung bases (wet read)  EKG NSR HR 95, LVH, TWI V1-2, early take off V4-6  - admit for HD  - titrate off O2  - trend troponin  - resume home meds & inhalers  NEPHRO Dr. Mosher

## 2022-03-16 NOTE — ED PROVIDER NOTE - CLINICAL SUMMARY MEDICAL DECISION MAKING FREE TEXT BOX
MAR and Dr. De La Torre endorsed. Pt agrees with admission. pt admitted for dyspnea. Will give Lasix 60mg for preload reduction.  I had a detailed discussion with the patient and/or guardian regarding the historical points, exam findings, and any diagnostic results supporting the admit diagnosis. MAR and Dr. De La Torre endorsed. Pt agrees with admission. pt admitted for dyspnea. Will give Lasix 60mg for preload reduction.  Elevated trop noted previously, most likely due to demand ischemia and ESRD.   I had a detailed discussion with the patient and/or guardian regarding the historical points, exam findings, and any diagnostic results supporting the admit diagnosis.

## 2022-03-16 NOTE — ED ADULT NURSE REASSESSMENT NOTE - NS ED NURSE REASSESS COMMENT FT1
Pt transferred to dialysis at this time. No acute distress noted, pt is A&Ox3, denies chest pain, no shortness of breath indicated. As pt request, pt is on O2 6L Non-rebreather, pt stats 100% on room air. MD MADELEINE Acuna aware.

## 2022-03-16 NOTE — CHART NOTE - NSCHARTNOTEFT_GEN_A_CORE
EVENT: Pt refusing labs to be drawn for follow up troponin level    BRIEF HPI: 62 yo M from [facility], Legally Blind 2/2 diabetic retinopathy, ESRD on HD Tue/Tr/Sat, CAD s/p MI, HLD, CHF, spinal stenosis, hyperparathyroidism, HTN, peripheral neuropathy, smoker's bronchitis and IDDM who BIB EMS from [facility] for SOB. Admit for HD. Dr. Mosher. Now refusing lab work for toponin.    Vital Signs Last 24 Hrs  T(C): 36.8 (16 Mar 2022 20:25), Max: 36.8 (16 Mar 2022 20:25)  T(F): 98.2 (16 Mar 2022 20:25), Max: 98.2 (16 Mar 2022 20:25)  HR: 94 (16 Mar 2022 20:25) (94 - 109)  BP: 153/76 (16 Mar 2022 20:25) (117/75 - 197/105)  BP(mean): --  RR: 19 (16 Mar 2022 20:25) (16 - 20)  SpO2: 95% (16 Mar 2022 20:25) (95% - 100%)    PLAN:  1. Admit to 50l B  2. Monitor for CV symptoms    FOLLOW UP: AM labs

## 2022-03-16 NOTE — ED PROVIDER NOTE - OBJECTIVE STATEMENT
Pt transferred from [facility] for shortness of breath and hypoxia as per [facility] endorsement pt with shortness of breath and hypoxia.  Pt placed on 100% NRBM, pox now 100%. Pt is without complaints. No chest pain, no fever.   Pt states received HD yesterday.  pt is difficult IV access, required US guidance of peripheral line.

## 2022-03-17 ENCOUNTER — TRANSCRIPTION ENCOUNTER (OUTPATIENT)
Age: 62
End: 2022-03-17

## 2022-03-17 DIAGNOSIS — D63.8 ANEMIA IN OTHER CHRONIC DISEASES CLASSIFIED ELSEWHERE: ICD-10-CM

## 2022-03-17 LAB
A1C WITH ESTIMATED AVERAGE GLUCOSE RESULT: 5.3 % — SIGNIFICANT CHANGE UP (ref 4–5.6)
ALBUMIN SERPL ELPH-MCNC: 3 G/DL — LOW (ref 3.5–5)
ALP SERPL-CCNC: 77 U/L — SIGNIFICANT CHANGE UP (ref 40–120)
ALT FLD-CCNC: 19 U/L DA — SIGNIFICANT CHANGE UP (ref 10–60)
ANION GAP SERPL CALC-SCNC: 7 MMOL/L — SIGNIFICANT CHANGE UP (ref 5–17)
AST SERPL-CCNC: 34 U/L — SIGNIFICANT CHANGE UP (ref 10–40)
BILIRUB SERPL-MCNC: 0.6 MG/DL — SIGNIFICANT CHANGE UP (ref 0.2–1.2)
BUN SERPL-MCNC: 23 MG/DL — HIGH (ref 7–18)
CALCIUM SERPL-MCNC: 8.4 MG/DL — SIGNIFICANT CHANGE UP (ref 8.4–10.5)
CHLORIDE SERPL-SCNC: 99 MMOL/L — SIGNIFICANT CHANGE UP (ref 96–108)
CO2 SERPL-SCNC: 30 MMOL/L — SIGNIFICANT CHANGE UP (ref 22–31)
CREAT SERPL-MCNC: 7.15 MG/DL — HIGH (ref 0.5–1.3)
EGFR: 8 ML/MIN/1.73M2 — LOW
ESTIMATED AVERAGE GLUCOSE: 105 MG/DL — SIGNIFICANT CHANGE UP (ref 68–114)
GLUCOSE BLDC GLUCOMTR-MCNC: 160 MG/DL — HIGH (ref 70–99)
GLUCOSE BLDC GLUCOMTR-MCNC: 206 MG/DL — HIGH (ref 70–99)
GLUCOSE BLDC GLUCOMTR-MCNC: 289 MG/DL — HIGH (ref 70–99)
GLUCOSE SERPL-MCNC: 247 MG/DL — HIGH (ref 70–99)
HCT VFR BLD CALC: 31.7 % — LOW (ref 39–50)
HGB BLD-MCNC: 9.9 G/DL — LOW (ref 13–17)
MAGNESIUM SERPL-MCNC: 2.7 MG/DL — HIGH (ref 1.6–2.6)
MCHC RBC-ENTMCNC: 29.4 PG — SIGNIFICANT CHANGE UP (ref 27–34)
MCHC RBC-ENTMCNC: 31.2 GM/DL — LOW (ref 32–36)
MCV RBC AUTO: 94.1 FL — SIGNIFICANT CHANGE UP (ref 80–100)
NRBC # BLD: 0 /100 WBCS — SIGNIFICANT CHANGE UP (ref 0–0)
PHOSPHATE SERPL-MCNC: 3.3 MG/DL — SIGNIFICANT CHANGE UP (ref 2.5–4.5)
PLATELET # BLD AUTO: 77 K/UL — LOW (ref 150–400)
POTASSIUM SERPL-MCNC: 4.6 MMOL/L — SIGNIFICANT CHANGE UP (ref 3.5–5.3)
POTASSIUM SERPL-SCNC: 4.6 MMOL/L — SIGNIFICANT CHANGE UP (ref 3.5–5.3)
PROT SERPL-MCNC: 6.3 G/DL — SIGNIFICANT CHANGE UP (ref 6–8.3)
RBC # BLD: 3.37 M/UL — LOW (ref 4.2–5.8)
RBC # FLD: 19.4 % — HIGH (ref 10.3–14.5)
SODIUM SERPL-SCNC: 136 MMOL/L — SIGNIFICANT CHANGE UP (ref 135–145)
TROPONIN I, HIGH SENSITIVITY RESULT: 88.5 NG/L — HIGH
WBC # BLD: 2.58 K/UL — LOW (ref 3.8–10.5)
WBC # FLD AUTO: 2.58 K/UL — LOW (ref 3.8–10.5)

## 2022-03-17 RX ORDER — ACETAMINOPHEN 500 MG
650 TABLET ORAL EVERY 6 HOURS
Refills: 0 | Status: DISCONTINUED | OUTPATIENT
Start: 2022-03-17 | End: 2022-03-18

## 2022-03-17 RX ORDER — INFLUENZA VIRUS VACCINE 15; 15; 15; 15 UG/.5ML; UG/.5ML; UG/.5ML; UG/.5ML
0.5 SUSPENSION INTRAMUSCULAR ONCE
Refills: 0 | Status: DISCONTINUED | OUTPATIENT
Start: 2022-03-17 | End: 2022-03-18

## 2022-03-17 RX ADMIN — OXYCODONE HYDROCHLORIDE 5 MILLIGRAM(S): 5 TABLET ORAL at 13:40

## 2022-03-17 RX ADMIN — Medication 3: at 17:19

## 2022-03-17 RX ADMIN — Medication 3 MILLIGRAM(S): at 21:46

## 2022-03-17 RX ADMIN — OXYCODONE HYDROCHLORIDE 5 MILLIGRAM(S): 5 TABLET ORAL at 22:17

## 2022-03-17 RX ADMIN — Medication 1000 UNIT(S): at 12:38

## 2022-03-17 RX ADMIN — Medication 650 MILLIGRAM(S): at 00:47

## 2022-03-17 RX ADMIN — Medication 650 MILLIGRAM(S): at 01:12

## 2022-03-17 RX ADMIN — Medication 1 MILLIGRAM(S): at 12:38

## 2022-03-17 RX ADMIN — FAMOTIDINE 20 MILLIGRAM(S): 10 INJECTION INTRAVENOUS at 17:18

## 2022-03-17 RX ADMIN — SIMVASTATIN 40 MILLIGRAM(S): 20 TABLET, FILM COATED ORAL at 21:45

## 2022-03-17 RX ADMIN — SEVELAMER CARBONATE 800 MILLIGRAM(S): 2400 POWDER, FOR SUSPENSION ORAL at 17:18

## 2022-03-17 RX ADMIN — Medication 25 MILLIGRAM(S): at 21:46

## 2022-03-17 RX ADMIN — Medication 81 MILLIGRAM(S): at 12:38

## 2022-03-17 RX ADMIN — Medication 650 MILLIGRAM(S): at 22:12

## 2022-03-17 RX ADMIN — OXYCODONE HYDROCHLORIDE 5 MILLIGRAM(S): 5 TABLET ORAL at 12:40

## 2022-03-17 RX ADMIN — NORTRIPTYLINE HYDROCHLORIDE 10 MILLIGRAM(S): 10 CAPSULE ORAL at 21:45

## 2022-03-17 RX ADMIN — Medication 10 MILLIGRAM(S): at 21:45

## 2022-03-17 NOTE — PROGRESS NOTE ADULT - ATTENDING COMMENTS
PATIENT WAS EVALUATED FOR    - FLUID OVER LOAD / SYSTOLIC CHF AND ESRD ON HD, HYPOXIC RESPIRATORY FAILURE  - PATIENT HAD BEEN MISSING HD - COUNSELLED TO GET HD REGULARLY, O2 SUPPLEMENTS AS NEEDED   - ESRD ON HD   - ANEMIA OF CKD     - GI AND DVT PROPHYLAXIS - D/C IN A.M. IF MEDICALLY STABLE  - FLUID OVER LOAD / SYSTOLIC CHF AND ESRD ON HD, HYPOXIC RESPIRATORY FAILURE  - PATIENT HAD BEEN MISSING HD - COUNSELLED TO GET HD REGULARLY, O2 SUPPLEMENTS AS NEEDED   - ESRD ON HD   - ANEMIA OF CKD     - GI AND DVT PROPHYLAXIS

## 2022-03-17 NOTE — PROGRESS NOTE ADULT - PROBLEM SELECTOR PLAN 1
- p/w SOB, saturating 100% on RA but prefers to keep NRBM on for comfort  - BNP elevated 10664, Trop slight elevation 145.7  - CXR with mild interstitial fluid in b/l lung bases (wet read)  - EKG NSR HR 95, LVH, TWI V1-2, early take off V4-6  - admit for HD  - titrate off O2  - trend troponin  - resume home meds & inhalers  - NEPHRO Dr. Mosher - p/w SOB, saturating 100% on RA but prefers to keep NRBM on for comfort  - BNP elevated 92708, Trop slight elevation 145.7  - CXR with mild interstitial fluid in b/l lung bases (wet read)  - EKG NSR HR 95, LVH, TWI V1-2, early take off V4-6  - admit for HD  - titrate off O2  - trend troponin  - resume home meds & inhalers  - Improved 3/17/22  - NEPHRO Dr. Mosher - p/w SOB, saturating 100% on RA but prefers to keep NRBM on for comfort  - BNP elevated 27498, contributed by fluid overload and CKD  - Trop slight elevation 145.7, trended down. Contributed by CKD  - CXR with mild interstitial fluid in b/l lung bases (wet read)  - EKG NSR HR 95, LVH, TWI V1-2, early take off V4-6  - admit for HD  - titrate off O2  - trend troponin  - resume home meds & inhalers  - Improved 3/17/22  - NEPHRO Dr. Mosher

## 2022-03-17 NOTE — PROGRESS NOTE ADULT - PROBLEM SELECTOR PLAN 5
- h/o anemia  - cont Fe-sulfate  - cont EPO with dialysis - h/o anemia from renal failure  - cont Fe-sulfate  - cont EPO with dialysis

## 2022-03-17 NOTE — DISCHARGE NOTE PROVIDER - NSDCMRMEDTOKEN_GEN_ALL_CORE_FT
Ambien 5 mg oral tablet: 1 tab(s) orally once a day (at bedtime)  aspirin 81 mg oral delayed release tablet: 1 tab(s) orally once a day  Benadryl 25 mg oral capsule: 1 tab(s) orally once a day (at bedtime)  cholecalciferol oral tablet: 1000 unit(s) orally once a day  DuoNeb 0.5 mg-2.5 mg/3 mL inhalation solution: 3 milliliter(s) inhaled 4 times a day  epoetin beverley: 4000 unit(s) intravenous 3 times a week (T, TH, &amp; Sat) during HD  ergocalciferol 1.25 mg (50,000 intl units) oral capsule: 1 cap(s) orally once a week  ferrous sulfate 325 mg (65 mg elemental iron) oral tablet: 1 tab(s) orally every other day  folic acid 1 mg oral tablet: 1 tab(s) orally once a day  hydrALAZINE 25 mg oral tablet: 1 tab(s) orally 3 times a day  insulin lispro 100 units/mL injectable solution: unit(s) injectable 3 times a day  1 Unit(s) if Glucose 151 - 200  2 Unit(s) if Glucose 201 - 250  3 Unit(s) if Glucose 251 - 300  4 Unit(s) if Glucose 301 - 350  5 Unit(s) if Glucose 351 - 400  6 Unit(s) if Glucose Greater Than 400  Levemir 100 units/mL subcutaneous solution: 4 unit(s) subcutaneous 2 times a day at 8am and 5pm  Lokelma 10 g oral powder for reconstitution: 1 packet(s) orally once a day in morning  melatonin 3 mg oral tablet: 1 tab(s) orally once a day (at bedtime)  nortriptyline 10 mg oral capsule: 1 cap(s) orally once a day (at bedtime)  Norvasc 10 mg oral tablet: 1 tab(s) orally once a day  oxyCODONE 5 mg oral tablet: 1 tab(s) orally 3 times a day at 8am/12pm/5pm  Pepcid 20 mg oral tablet: 1 tab(s) orally 2 times a day  Reglan 10 mg oral tablet: 1 tab(s) orally 3 times a day  8am/12pm/5pm  Polina-Dior oral tablet: 1 tab(s) orally once a day  sevelamer carbonate 2.4 g oral powder for reconstitution: 1 each orally 3 times a day (with meals)  Zocor 40 mg oral tablet: 1 tab(s) orally once a day (at bedtime)   Ambien 5 mg oral tablet: 1 tab(s) orally once a day (at bedtime)  aspirin 81 mg oral delayed release tablet: 1 tab(s) orally once a day  Benadryl 25 mg oral capsule: 1 tab(s) orally once a day (at bedtime)  cholecalciferol oral tablet: 1000 unit(s) orally once a day  DuoNeb 0.5 mg-2.5 mg/3 mL inhalation solution: 3 milliliter(s) inhaled 4 times a day  epoetin beverley: 4000 unit(s) intravenous 3 times a week (T, TH, &amp; Sat) during HD  ergocalciferol 1.25 mg (50,000 intl units) oral capsule: 1 cap(s) orally once a week  ferrous sulfate 325 mg (65 mg elemental iron) oral tablet: 1 tab(s) orally every other day  folic acid 1 mg oral tablet: 1 tab(s) orally once a day  hydrALAZINE 25 mg oral tablet: 1 tab(s) orally 3 times a day  insulin lispro 100 units/mL injectable solution: unit(s) injectable 3 times a day  1 Unit(s) if Glucose 151 - 200  2 Unit(s) if Glucose 201 - 250  3 Unit(s) if Glucose 251 - 300  4 Unit(s) if Glucose 301 - 350  5 Unit(s) if Glucose 351 - 400  6 Unit(s) if Glucose Greater Than 400  Levemir 100 units/mL subcutaneous solution: 4 unit(s) subcutaneous 2 times a day at 8am and 5pm  Lokelma 10 g oral powder for reconstitution: 1 packet(s) orally once a day in morning  melatonin 3 mg oral tablet: 1 tab(s) orally once a day (at bedtime)  nortriptyline 10 mg oral capsule: 1 cap(s) orally once a day (at bedtime)  Norvasc 10 mg oral tablet: 1 tab(s) orally once a day  oxyCODONE 5 mg oral tablet: 1 tab(s) orally 3 times a day at 8am/12pm/5pm  Pepcid 20 mg oral tablet: 1 tab(s) orally 2 times a day  Reglan 10 mg oral tablet: 1 tab(s) orally 3 times a day  8am/12pm/5pm  Polina-Dior oral tablet: 1 tab(s) orally once a day  sevelamer carbonate 2.4 g oral powder for reconstitution: 1 each orally 3 times a day (with meals)  tiotropium 18 mcg inhalation capsule: 1 cap(s) inhaled once a day  Zocor 40 mg oral tablet: 1 tab(s) orally once a day (at bedtime)

## 2022-03-17 NOTE — PROGRESS NOTE ADULT - SUBJECTIVE AND OBJECTIVE BOX
PGY-1 Progress Note discussed with attending    PAGER #: [[phone]] TILL 5:00 PM  PLEASE CONTACT ON CALL TEAM:  - On Call Team (Please refer to Dejon) FROM 5:00 PM - 8:30PM  - Nightfloat Team FROM 8:30 -7:30 AM    CHIEF COMPLAINT & BRIEF HOSPITAL COURSE:    INTERVAL HPI/OVERNIGHT EVENTS:   Patient seen and examined at bedside. No acute events overnight.    REVIEW OF SYSTEMS:  CONSTITUTIONAL: No fever, weight loss, or fatigue  RESPIRATORY: No cough, wheezing, chills or hemoptysis; No shortness of breath  CARDIOVASCULAR: No chest pain, palpitations, dizziness, or leg swelling  GASTROINTESTINAL: No abdominal pain. No nausea, vomiting, or hematemesis; No diarrhea or constipation. No melena or hematochezia.  GENITOURINARY: No dysuria or hematuria, urinary frequency  NEUROLOGICAL: No headaches, memory loss, loss of strength, numbness, or tremors  SKIN: No itching, burning, rashes, or lesions     acetaminophen     Tablet .. 650 milliGRAM(s) Oral every 6 hours PRN  ALBUTerol    90 MICROgram(s) HFA Inhaler 2 Puff(s) Inhalation every 6 hours PRN  amLODIPine   Tablet 10 milliGRAM(s) Oral daily  aspirin enteric coated 81 milliGRAM(s) Oral daily  cholecalciferol 1000 Unit(s) Oral daily  dextrose 40% Gel 15 Gram(s) Oral once  dextrose 5%. 1000 milliLiter(s) IV Continuous <Continuous>  dextrose 5%. 1000 milliLiter(s) IV Continuous <Continuous>  dextrose 50% Injectable 25 Gram(s) IV Push once  dextrose 50% Injectable 12.5 Gram(s) IV Push once  dextrose 50% Injectable 25 Gram(s) IV Push once  diphenhydrAMINE 25 milliGRAM(s) Oral at bedtime PRN  famotidine    Tablet 20 milliGRAM(s) Oral two times a day  ferrous    sulfate 325 milliGRAM(s) Oral <User Schedule>  folic acid 1 milliGRAM(s) Oral daily  glucagon  Injectable 1 milliGRAM(s) IntraMuscular once  heparin   Injectable 5000 Unit(s) SubCutaneous every 8 hours  hydrALAZINE 25 milliGRAM(s) Oral three times a day  influenza   Vaccine 0.5 milliLiter(s) IntraMuscular once  insulin lispro (ADMELOG) corrective regimen sliding scale   SubCutaneous three times a day before meals  melatonin 3 milliGRAM(s) Oral at bedtime  metoclopramide 10 milliGRAM(s) Oral three times a day  Nephro-luann 1 Tablet(s) Oral daily  nortriptyline 10 milliGRAM(s) Oral at bedtime  oxyCODONE    IR 5 milliGRAM(s) Oral <User Schedule>  sevelamer carbonate 800 milliGRAM(s) Oral three times a day with meals  simvastatin 40 milliGRAM(s) Oral at bedtime  sodium zirconium cyclosilicate 10 Gram(s) Oral daily  tiotropium 18 MICROgram(s) Capsule 1 Capsule(s) Inhalation daily  zolpidem 5 milliGRAM(s) Oral at bedtime PRN      Vital Signs Last 24 Hrs  T(C): 36.8 (17 Mar 2022 04:45), Max: 37.3 (16 Mar 2022 23:25)  T(F): 98.3 (17 Mar 2022 04:45), Max: 99.1 (16 Mar 2022 23:25)  HR: 81 (17 Mar 2022 04:45) (81 - 99)  BP: 142/72 (17 Mar 2022 04:45) (117/75 - 165/94)  BP(mean): --  RR: 18 (17 Mar 2022 04:45) (16 - 19)  SpO2: 90% (17 Mar 2022 04:45) (90% - 100%)    PHYSICAL EXAMINATION:  GENERAL: NAD  HEAD:  Atraumatic, Normocephalic  EYES:  conjunctiva and sclera clear  NECK: Supple, No JVD, Normal thyroid  CHEST/LUNG: Clear to auscultation; No rales, rhonchi, wheezing, or rubs  HEART: Regular rate and rhythm; No murmurs, rubs, or gallops  ABDOMEN: Soft, Nontender, Nondistended; Bowel sounds present  NERVOUS SYSTEM:  Alert & Oriented X3,   EXTREMITIES:  2+ Peripheral Pulses, No clubbing, cyanosis, or edema  SKIN: warm dry                          8.7    7.25  )-----------( 135      ( 16 Mar 2022 05:15 )             27.1     03-16    139  |  104  |  21<H>  ----------------------------<  133<H>  5.1   |  30  |  7.47<H>    Ca    8.2<L>      16 Mar 2022 05:14    TPro  6.4  /  Alb  3.0<L>  /  TBili  0.5  /  DBili  x   /  AST  28  /  ALT  19  /  AlkPhos  80  03-16    LIVER FUNCTIONS - ( 16 Mar 2022 05:14 )  Alb: 3.0 g/dL / Pro: 6.4 g/dL / ALK PHOS: 80 U/L / ALT: 19 U/L DA / AST: 28 U/L / GGT: x                   CAPILLARY BLOOD GLUCOSE      RADIOLOGY & ADDITIONAL TESTS:           PGY-1 Progress Note discussed with attending    PAGER #: [phone]] TILL 5:00 PM  PLEASE CONTACT ON CALL TEAM:  - On Call Team (Please refer to Dejon) FROM 5:00 PM - 8:30PM  - Nightfloat Team FROM 8:30 -7:30 AM    CHIEF COMPLAINT & BRIEF HOSPITAL COURSE:    INTERVAL HPI/OVERNIGHT EVENTS:   Patient seen and examined at bedside. No acute events overnight. Patient reports improvement in his breathing. Denies other complaints.    REVIEW OF SYSTEMS:  CONSTITUTIONAL: No fever, weight loss, or fatigue  RESPIRATORY: No cough, wheezing, chills or hemoptysis; No shortness of breath  CARDIOVASCULAR: No chest pain, palpitations, dizziness, or leg swelling  GASTROINTESTINAL: No abdominal pain. No nausea, vomiting, or hematemesis; No diarrhea or constipation. No melena or hematochezia.  GENITOURINARY: No dysuria or hematuria, urinary frequency  NEUROLOGICAL: No headaches, memory loss, loss of strength, numbness, or tremors  SKIN: No itching, burning, rashes, or lesions     acetaminophen     Tablet .. 650 milliGRAM(s) Oral every 6 hours PRN  ALBUTerol    90 MICROgram(s) HFA Inhaler 2 Puff(s) Inhalation every 6 hours PRN  amLODIPine   Tablet 10 milliGRAM(s) Oral daily  aspirin enteric coated 81 milliGRAM(s) Oral daily  cholecalciferol 1000 Unit(s) Oral daily  dextrose 40% Gel 15 Gram(s) Oral once  dextrose 5%. 1000 milliLiter(s) IV Continuous <Continuous>  dextrose 5%. 1000 milliLiter(s) IV Continuous <Continuous>  dextrose 50% Injectable 25 Gram(s) IV Push once  dextrose 50% Injectable 12.5 Gram(s) IV Push once  dextrose 50% Injectable 25 Gram(s) IV Push once  diphenhydrAMINE 25 milliGRAM(s) Oral at bedtime PRN  famotidine    Tablet 20 milliGRAM(s) Oral two times a day  ferrous    sulfate 325 milliGRAM(s) Oral <User Schedule>  folic acid 1 milliGRAM(s) Oral daily  glucagon  Injectable 1 milliGRAM(s) IntraMuscular once  heparin   Injectable 5000 Unit(s) SubCutaneous every 8 hours  hydrALAZINE 25 milliGRAM(s) Oral three times a day  influenza   Vaccine 0.5 milliLiter(s) IntraMuscular once  insulin lispro (ADMELOG) corrective regimen sliding scale   SubCutaneous three times a day before meals  melatonin 3 milliGRAM(s) Oral at bedtime  metoclopramide 10 milliGRAM(s) Oral three times a day  Nephro-luann 1 Tablet(s) Oral daily  nortriptyline 10 milliGRAM(s) Oral at bedtime  oxyCODONE    IR 5 milliGRAM(s) Oral <User Schedule>  sevelamer carbonate 800 milliGRAM(s) Oral three times a day with meals  simvastatin 40 milliGRAM(s) Oral at bedtime  sodium zirconium cyclosilicate 10 Gram(s) Oral daily  tiotropium 18 MICROgram(s) Capsule 1 Capsule(s) Inhalation daily  zolpidem 5 milliGRAM(s) Oral at bedtime PRN      Vital Signs Last 24 Hrs  T(C): 36.8 (17 Mar 2022 04:45), Max: 37.3 (16 Mar 2022 23:25)  T(F): 98.3 (17 Mar 2022 04:45), Max: 99.1 (16 Mar 2022 23:25)  HR: 81 (17 Mar 2022 04:45) (81 - 99)  BP: 142/72 (17 Mar 2022 04:45) (117/75 - 165/94)  BP(mean): --  RR: 18 (17 Mar 2022 04:45) (16 - 19)  SpO2: 90% (17 Mar 2022 04:45) (90% - 100%)    PHYSICAL EXAMINATION:  GENERAL: NAD  HEAD:  Atraumatic, Normocephalic  EYES: haziness on left eye. conjunctiva and sclera clear  NECK: Supple, No JVD, Normal thyroid  CHEST/LUNG: Bilateral crackles; No rales, rhonchi, wheezing, or rubs  HEART: Regular rate and rhythm; No murmurs, rubs, or gallops  ABDOMEN: Soft, Nontender, Nondistended; Bowel sounds present  NERVOUS SYSTEM:  Alert & Oriented X3,   EXTREMITIES: L knee below-the-knee amputation.  2+ Peripheral Pulses, No clubbing, cyanosis, or edema  SKIN: warm dry                          8.7    7.25  )-----------( 135      ( 16 Mar 2022 05:15 )             27.1     03-16    139  |  104  |  21<H>  ----------------------------<  133<H>  5.1   |  30  |  7.47<H>    Ca    8.2<L>      16 Mar 2022 05:14    TPro  6.4  /  Alb  3.0<L>  /  TBili  0.5  /  DBili  x   /  AST  28  /  ALT  19  /  AlkPhos  80  03-16    LIVER FUNCTIONS - ( 16 Mar 2022 05:14 )  Alb: 3.0 g/dL / Pro: 6.4 g/dL / ALK PHOS: 80 U/L / ALT: 19 U/L DA / AST: 28 U/L / GGT: x                   CAPILLARY BLOOD GLUCOSE      RADIOLOGY & ADDITIONAL TESTS:

## 2022-03-17 NOTE — DISCHARGE NOTE PROVIDER - NSDCCPCAREPLAN_GEN_ALL_CORE_FT
PRINCIPAL DISCHARGE DIAGNOSIS  Diagnosis: SOB (shortness of breath)  Assessment and Plan of Treatment: You presented with shortness of breath. You were put on supplemental oxygen for comfort until you received your dialysis. BNP was elevated at [address], Trop slight elevation 145.7, and it _______. CXR showed moderate CHF(pulmonary edema). EKG was NSR with HR 95, LVH, TWI V1-2, early take off V4-6.      SECONDARY DISCHARGE DIAGNOSES  Diagnosis: ESRD on hemodialysis  Assessment and Plan of Treatment: Nephrologist Dr. Mosher was consulted. You received hemodialysis on 3/16/22    Diagnosis: Anemia  Assessment and Plan of Treatment: You have history of chronic anemia from CKD, and his home meds were continued.      Diagnosis: Hypertension  Assessment and Plan of Treatment: You have a history of hypertension and take ***** for it. Your blood pressure was controlled during your admission. You should continue to take your medications as prescribed. You should follow a DASH (Dietary Approaches to Stop Hypertension) diet. The DASH diet encourages you to reduce the sodium in your diet and eat a variety of foods rich in nutrients that help lower blood pressure, such as potassium, calcium and magnesium. You should try to excersise at least 150 minutes per week. You should follow up with your doctor within 2 week of discharge for monitoring and medication adjustment.      Diagnosis: Diabetes  Assessment and Plan of Treatment: You have history of diabetes, insulin sliding scale was ordered.       PRINCIPAL DISCHARGE DIAGNOSIS  Diagnosis: SOB (shortness of breath)  Assessment and Plan of Treatment: You presented with shortness of breath. You were put on supplemental oxygen for comfort until you received your dialysis. ProBNP was elevated at [address], Trop slight elevation 145.7, and it trended down. Chest xray showed moderate CHF(pulmonary edema). EKG had normal sinus rhythm with heart rate of 95, Left ventricular hypertrophy, T wave inversion in V1-2, early take off V4-6. Please follow up with your heart doctor in 2 weeks after discharge.      SECONDARY DISCHARGE DIAGNOSES  Diagnosis: ESRD on hemodialysis  Assessment and Plan of Treatment: Nephrologist Dr. Mosher was consulted. You received hemodialysis on 3/16/22. You also received diaysis on 3/18/22. Please be compliant on your dialysis schedule and follow up with your kidney doctor regularly.    Diagnosis: Hypertension  Assessment and Plan of Treatment: You have a history of hypertension and take medicationsfor it. Your blood pressure was controlled during your admission. We did not change any of your medications. You should continue to take your medications as prescribed. You should follow a DASH (Dietary Approaches to Stop Hypertension) diet. The DASH diet encourages you to reduce the sodium in your diet and eat a variety of foods rich in nutrients that help lower blood pressure, such as potassium, calcium and magnesium. You should try to excersise at least 150 minutes per week. You should follow up with your doctor within 2 week of discharge for monitoring and medication adjustment.      Diagnosis: Diabetes  Assessment and Plan of Treatment: You have history of diabetes, insulin sliding scale was ordered at the hospital. We did not change your medications. Please continue your home medications. Continue to lead low carb diet.    Diagnosis: Anemia  Assessment and Plan of Treatment: You have history of chronic anemia from CKD, and your home meds were continued. Please continue taking medication as prescribed. Please follow up with your doctor in 2 weeks after discharge.     PRINCIPAL DISCHARGE DIAGNOSIS  Diagnosis: SOB (shortness of breath)  Assessment and Plan of Treatment: You presented with shortness of breath. You were put on supplemental oxygen for comfort until you received your dialysis. ProBNP was elevated at [address], Cardiac enzyme troponin(high sensitivity) was slightly elevated at 145.7, and it trended down. Chest xray showed moderate CHF(pulmonary edema). EKG had normal sinus rhythm with heart rate of 95, Left ventricular hypertrophy, T wave inversion in V1-2, early take off V4-6. Please follow up with your heart doctor in 2 weeks after discharge.      SECONDARY DISCHARGE DIAGNOSES  Diagnosis: ESRD on hemodialysis  Assessment and Plan of Treatment: Nephrologist Dr. Mosher was consulted. You received hemodialysis on 3/16/22. You also received diaysis on 3/18/22. Please be compliant on your dialysis schedule and follow up with your kidney doctor regularly.    Diagnosis: Hypertension  Assessment and Plan of Treatment: You have a history of hypertension and take medicationsfor it. Your blood pressure was controlled during your admission. We did not change any of your medications. You should continue to take your medications as prescribed. You should follow a DASH (Dietary Approaches to Stop Hypertension) diet. The DASH diet encourages you to reduce the sodium in your diet and eat a variety of foods rich in nutrients that help lower blood pressure, such as potassium, calcium and magnesium. You should try to excersise at least 150 minutes per week. You should follow up with your doctor within 2 week of discharge for monitoring and medication adjustment.      Diagnosis: Diabetes  Assessment and Plan of Treatment: You have history of diabetes, insulin sliding scale was ordered at the hospital. We did not change your medications. Please continue your home medications. Continue to lead low carb diet.    Diagnosis: Anemia  Assessment and Plan of Treatment: You have history of chronic anemia from CKD, and your home meds were continued. Please continue taking medication as prescribed. Please follow up with your doctor in 2 weeks after discharge.     PRINCIPAL DISCHARGE DIAGNOSIS  Diagnosis: Acute respiratory failure with hypoxia  Assessment and Plan of Treatment: You presented with shortness of breath. You were put on supplemental oxygen for comfort until you received your dialysis. ProBNP was elevated at [address]15, Cardiac enzyme troponin (high sensitivity) was slightly elevated at 145.7 and they trended down most likely elevated in the setting of fluid overload. Chest xray showed moderate pulmonary edema. EKG had normal sinus rhythm with heart rate of 95, Left ventricular hypertrophy, T wave inversion in V1-2, early take off V4-6. Please continue to take your medications and follow up with your heart doctor in 2 weeks after discharge.      SECONDARY DISCHARGE DIAGNOSES  Diagnosis: ESRD on hemodialysis  Assessment and Plan of Treatment: You have history of ESRD on Hemodialysis. You were dialyzed during your hospital stay 3/16 and 3/18, BMP was closely monitored.   Please continue following your established dialysis schedule, be compliant with medications and follow up with your PCP and Nephrologist in a week from discharge.      Diagnosis: Hypertension  Assessment and Plan of Treatment: You have a history of Hypertension.  On this admission, your Blood Pressure was adequately controlled with home meds.  Your blood pressure target is 120-140/80-90, maintain healthy lifestyle, low salt diet, avoid fatty food, weight loss, stay active as tolerated 30 mins X 3 times per week.  Notify your doctor if you have any of the following symptoms:   (Dizziness, Lightheadedness, Blurry vision, Headache, Chest pain, Shortness of breath.)  Please continue taking your home medications and follow-up with your PCP/Nephrologist in 1 week from discharge to adjust medications as needed.      Diagnosis: Diabetes  Assessment and Plan of Treatment: You have history of diabetes, insulin sliding scale was ordered at the hospital. You need to continue monitoring your blood sugar levels closely and maintain healthy lifestyle by eating healthy diabetic regimen, stay active as tolerated. Please continue your home medications and follow up with your PCP/Endocrinologist within a week of discharge.      Diagnosis: Anemia  Assessment and Plan of Treatment: You have history of chronic anemia from CKD, and your home meds were continued. Please continue taking your home medication and follow up with your doctor in 2 weeks after discharge.

## 2022-03-17 NOTE — PROGRESS NOTE ADULT - PROBLEM SELECTOR PLAN 2
- no missed HD, last HD 3/15  - BUN Cr 21 / 7.47  - plan for HD today 3/16 Wednesday  - NEPHRO Dr. Mosher - no missed HD, last HD 3/15  - BUN Cr 21 / 7.47  - HD 3/16   - HD 3/18  - NEPHRO Dr. Mosher

## 2022-03-17 NOTE — DISCHARGE NOTE PROVIDER - NSDCQMAMI_CARD_ALL_CORE
Interval HPI / Overnight events:   2dMale, born at Gestational Age  40 (04 Aug 2016 12:04)    No acute events overnight.     Feeding / voiding/ stooling appropriately    Physical Exam:   Current Weight: Daily     Daily Weight Gm: 2882 (06 Aug 2017 01:00)      Gen: NAD, alert, active  HEENT: MMM, AFOF, + red reflex b/l, positional plagiocephaly  CVS: s1/s2, RRR, no murmur,  Lungs:LCTA b/l  Abd: S/NT/ND +BS, no HSM,  umb c/d/i  Neuro: +grasp/suck/tristan  Musc: barton/ortolani WNL  Genitalia: normal for age and sex  Skin: no abnormal rash      Laboratory & Imaging Studies:   Total Bilirubin: 4.9 mg/dL  Direct Bilirubin: --        Family Discussion:   Feeding and baby weight loss were discussed today. Parent questions were answered    A/P  Normal Healthy   Routine care: follow CCHD,  screen, hearing screen, bilirubin No

## 2022-03-17 NOTE — DISCHARGE NOTE PROVIDER - CARE PROVIDER_API CALL
Dario De La Torre)  Medicine  [id number] [place] [address]  Phone: [phone]  Fax: [phone]  Follow Up Time: 2 weeks

## 2022-03-17 NOTE — DISCHARGE NOTE PROVIDER - HOSPITAL COURSE
Patient is a 62 yo M from [facility], Legally Blind 2/2 diabetic retinopathy, ESRD on HD Tue/Tr/Sat, CAD s/p MI, HLD, CHF, spinal stenosis, hyperparathyroidism, HTN, peripheral neuropathy, smoker's bronchitis and IDDM who BIB EMS from [facility] for SOB. Admit for HD.    Patient presented with SOB, saturating 100% on RA but prefers to keep NRBM on for comfort. BNP was elevated at [address], Trop slight elevation 145.7, and it _______.  CXR showed moderate CHF(pulmonary edema). EKG was NSR with HR 95, LVH, TWI V1-2, early take off V4-6. Nephrologist Dr. Mosher was consulted. Patient received hemodialysis on 3/16/22    For patient's hypertension, home meds were continued with parameters.  For patient's diabetes, insulin sliding scale was ordered.  Patient had history of chronic anemia from CKD, and his home meds were continued.    Patient is stable for discharge. Patient has been advised to follow up as outpatient. Case has been discussed with the attending.   Patient is a 62 yo M from [facility], Legally Blind 2/2 diabetic retinopathy, ESRD on HD Tue/Tr/Sat, CAD s/p MI, HLD, CHF, spinal stenosis, hyperparathyroidism, HTN, peripheral neuropathy, smoker's bronchitis and IDDM who BIB EMS from [facility] for SOB. Admit for HD.    Patient presented with SOB, saturating 100% on RA but prefers to keep NRBM on for comfort. BNP was elevated at 6[address], Trop slight elevation 145.7, and it trended down.  CXR showed moderate CHF(pulmonary edema). EKG was NSR with HR 95, LVH, TWI V1-2, early take off V4-6. Nephrologist Dr. Mosher was consulted. Patient received hemodialysis on 3/16/22. Patient received dialysis on the morning of 3/18/2022.    For patient's hypertension, home meds were continued with parameters.  For patient's diabetes, insulin sliding scale was ordered.  Patient had history of chronic anemia from CKD, and his home meds were continued.    Patient is stable for discharge. Patient has been advised to follow up as outpatient. Case has been discussed with the attending.   Patient is a 60 yo M from [facility], Legally Blind 2/2 diabetic retinopathy, ESRD on HD Tue/Tr/Sat, CAD s/p MI, HLD, CHF, spinal stenosis, hyperparathyroidism, HTN, peripheral neuropathy, smoker's bronchitis and IDDM who BIB EMS from [facility] for SOB. Admit for HD.    Patient presented with SOB, saturating 100% on RA but prefers to keep NRBM on for comfort. [place]NP was elevated at [address], Trop slight elevation 145.7, and it trended down.  CXR showed moderate CHF(pulmonary edema). EKG was NSR with HR 95, LVH, TWI V1-2, early take off V4-6. Nephrologist Dr. Mosher was consulted. Patient received hemodialysis on 3/16/22. Patient received dialysis on the morning of 3/18/2022.    For patient's hypertension, home meds were continued with parameters.  For patient's diabetes, insulin sliding scale was ordered.  Patient had history of chronic anemia from CKD, and his home meds were continued.    Patient is stable for discharge. Patient has been advised to follow up as outpatient. Case has been discussed with the attending.   62 yo M from [facility], Legally Blind 2/2 diabetic retinopathy, ESRD on HD Tue/Tr/Sat, CAD s/p MI, HLD, CHF, spinal stenosis, hyperparathyroidism, HTN, peripheral neuropathy, smoker's bronchitis and IDDM who BIB EMS from [facility] for SOB. Admitted for fluid overload 2/2 CHF and ESRD in need of HD.    Patient presented with SOB, saturating 100% on RA but prefers to keep NRBM on for comfort. BNP was elevated at 07[address], Trop slight elevation 145.7, and it trended down.  CXR showed moderate pulmonary edema. EKG was NSR with HR 95, LVH, TWI V1-2, early take off V4-6. Nephrologist Dr. Mosher was consulted. Patient received hemodialysis on 3/16/22. Patient received dialysis on the morning of 3/18/2022.    For patient's hypertension, home meds were continued with parameters.  For patient's diabetes, insulin sliding scale was ordered.  Patient has history of chronic anemia from CKD, and his home meds were continued.    Patient is stable for discharge. Patient has been advised to follow up as outpatient. Case has been discussed with the attending.   62 yo M from [facility], Legally Blind 2/2 diabetic retinopathy, ESRD on HD Tue/Tr/Sat, CAD s/p MI, HLD, CHF, spinal stenosis, hyperparathyroidism, HTN, peripheral neuropathy, smoker's bronchitis and IDDM who BIB EMS from [facility] for SOB. Admitted for AHRF 2/2 fluid overload from CHF and ESRD in need of HD.    Patient presented with SOB, saturating 100% on RA but prefers to keep NRBM on for comfort. BNP was elevated at 27035, Trop slight elevation 145.7, and it trended down.  CXR showed moderate pulmonary edema. EKG was NSR with HR 95, LVH, TWI V1-2, early take off V4-6. Nephrologist Dr. Mosher was consulted. Patient received hemodialysis on 3/16/22. Patient received dialysis on the morning of 3/18/2022.    For patient's hypertension, home meds were continued with parameters.  For patient's diabetes, insulin sliding scale was ordered.  Patient has history of chronic anemia from CKD, and his home meds were continued.    Patient is stable for discharge. Patient has been advised to follow up as outpatient. Case has been discussed with the attending.

## 2022-03-17 NOTE — DISCHARGE NOTE PROVIDER - NSDCCAREPROVSEEN_GEN_ALL_CORE_FT
Sammy, Bradley Wetzel, Denisa Morris, Katherin Acuna, Jesi Joe, Nimisha Mohamud, Rafiq John, Martha De La Torre, Dario De La Torre, Felicia

## 2022-03-17 NOTE — PATIENT PROFILE ADULT - FALL HARM RISK - RISK INTERVENTIONS
Use of alarms - bed, chair and/or voice tab Assistance OOB with selected safe patient handling equipment/Communicate Fall Risk and Risk Factors to all staff, patient, and family/Discuss with provider need for PT consult/Monitor gait and stability/Provide patient with walking aids - walker, cane, crutches/Reinforce activity limits and safety measures with patient and family/Visual Cue: Yellow wristband/Bed in lowest position, wheels locked, appropriate side rails in place/Call bell, personal items and telephone in reach/Instruct patient to call for assistance before getting out of bed or chair/Non-slip footwear when patient is out of bed/[place] to call system/Physically safe environment - no spills, clutter or unnecessary equipment/Purposeful Proactive Rounding/Room/bathroom lighting operational, light cord in reach

## 2022-03-18 ENCOUNTER — TRANSCRIPTION ENCOUNTER (OUTPATIENT)
Age: 62
End: 2022-03-18

## 2022-03-18 VITALS
SYSTOLIC BLOOD PRESSURE: 141 MMHG | TEMPERATURE: 99 F | DIASTOLIC BLOOD PRESSURE: 81 MMHG | RESPIRATION RATE: 18 BRPM | HEART RATE: 101 BPM | OXYGEN SATURATION: 96 %

## 2022-03-18 DIAGNOSIS — J96.01 ACUTE RESPIRATORY FAILURE WITH HYPOXIA: ICD-10-CM

## 2022-03-18 LAB
ALBUMIN SERPL ELPH-MCNC: 2.9 G/DL — LOW (ref 3.5–5)
ALP SERPL-CCNC: 91 U/L — SIGNIFICANT CHANGE UP (ref 40–120)
ALT FLD-CCNC: 16 U/L DA — SIGNIFICANT CHANGE UP (ref 10–60)
ANION GAP SERPL CALC-SCNC: 8 MMOL/L — SIGNIFICANT CHANGE UP (ref 5–17)
ANISOCYTOSIS BLD QL: SLIGHT — SIGNIFICANT CHANGE UP
AST SERPL-CCNC: 14 U/L — SIGNIFICANT CHANGE UP (ref 10–40)
BILIRUB SERPL-MCNC: 0.5 MG/DL — SIGNIFICANT CHANGE UP (ref 0.2–1.2)
BUN SERPL-MCNC: 32 MG/DL — HIGH (ref 7–18)
CALCIUM SERPL-MCNC: 8.1 MG/DL — LOW (ref 8.4–10.5)
CHLORIDE SERPL-SCNC: 99 MMOL/L — SIGNIFICANT CHANGE UP (ref 96–108)
CO2 SERPL-SCNC: 28 MMOL/L — SIGNIFICANT CHANGE UP (ref 22–31)
CREAT SERPL-MCNC: 8.92 MG/DL — HIGH (ref 0.5–1.3)
EGFR: 6 ML/MIN/1.73M2 — LOW
GLUCOSE BLDC GLUCOMTR-MCNC: 137 MG/DL — HIGH (ref 70–99)
GLUCOSE BLDC GLUCOMTR-MCNC: 166 MG/DL — HIGH (ref 70–99)
GLUCOSE SERPL-MCNC: 173 MG/DL — HIGH (ref 70–99)
HCT VFR BLD CALC: 28.5 % — LOW (ref 39–50)
HGB BLD-MCNC: 9 G/DL — LOW (ref 13–17)
HYPOCHROMIA BLD QL: SLIGHT — SIGNIFICANT CHANGE UP
MAGNESIUM SERPL-MCNC: 3 MG/DL — HIGH (ref 1.6–2.6)
MANUAL SMEAR VERIFICATION: SIGNIFICANT CHANGE UP
MCHC RBC-ENTMCNC: 28.8 PG — SIGNIFICANT CHANGE UP (ref 27–34)
MCHC RBC-ENTMCNC: 31.6 GM/DL — LOW (ref 32–36)
MCV RBC AUTO: 91.3 FL — SIGNIFICANT CHANGE UP (ref 80–100)
NRBC # BLD: 0 /100 WBCS — SIGNIFICANT CHANGE UP (ref 0–0)
OVALOCYTES BLD QL SMEAR: SLIGHT — SIGNIFICANT CHANGE UP
PHOSPHATE SERPL-MCNC: 3.9 MG/DL — SIGNIFICANT CHANGE UP (ref 2.5–4.5)
PLAT MORPH BLD: NORMAL — SIGNIFICANT CHANGE UP
PLATELET # BLD AUTO: 127 K/UL — LOW (ref 150–400)
POIKILOCYTOSIS BLD QL AUTO: SLIGHT — SIGNIFICANT CHANGE UP
POLYCHROMASIA BLD QL SMEAR: SLIGHT — SIGNIFICANT CHANGE UP
POTASSIUM SERPL-MCNC: 4.5 MMOL/L — SIGNIFICANT CHANGE UP (ref 3.5–5.3)
POTASSIUM SERPL-SCNC: 4.5 MMOL/L — SIGNIFICANT CHANGE UP (ref 3.5–5.3)
PROT SERPL-MCNC: 6 G/DL — SIGNIFICANT CHANGE UP (ref 6–8.3)
RBC # BLD: 3.12 M/UL — LOW (ref 4.2–5.8)
RBC # FLD: 19.6 % — HIGH (ref 10.3–14.5)
RBC BLD AUTO: ABNORMAL
SCHISTOCYTES BLD QL AUTO: SLIGHT — SIGNIFICANT CHANGE UP
SODIUM SERPL-SCNC: 135 MMOL/L — SIGNIFICANT CHANGE UP (ref 135–145)
SPHEROCYTES BLD QL SMEAR: SLIGHT — SIGNIFICANT CHANGE UP
WBC # BLD: 2.17 K/UL — LOW (ref 3.8–10.5)
WBC # FLD AUTO: 2.17 K/UL — LOW (ref 3.8–10.5)

## 2022-03-18 PROCEDURE — 83036 HEMOGLOBIN GLYCOSYLATED A1C: CPT

## 2022-03-18 PROCEDURE — 93005 ELECTROCARDIOGRAM TRACING: CPT

## 2022-03-18 PROCEDURE — 71045 X-RAY EXAM CHEST 1 VIEW: CPT

## 2022-03-18 PROCEDURE — 83735 ASSAY OF MAGNESIUM: CPT

## 2022-03-18 PROCEDURE — 99261: CPT

## 2022-03-18 PROCEDURE — 99285 EMERGENCY DEPT VISIT HI MDM: CPT

## 2022-03-18 PROCEDURE — 36415 COLL VENOUS BLD VENIPUNCTURE: CPT

## 2022-03-18 PROCEDURE — 84484 ASSAY OF TROPONIN QUANT: CPT

## 2022-03-18 PROCEDURE — 82962 GLUCOSE BLOOD TEST: CPT

## 2022-03-18 PROCEDURE — 85025 COMPLETE CBC W/AUTO DIFF WBC: CPT

## 2022-03-18 PROCEDURE — 80053 COMPREHEN METABOLIC PANEL: CPT

## 2022-03-18 PROCEDURE — 83880 ASSAY OF NATRIURETIC PEPTIDE: CPT

## 2022-03-18 PROCEDURE — 87635 SARS-COV-2 COVID-19 AMP PRB: CPT

## 2022-03-18 PROCEDURE — 84100 ASSAY OF PHOSPHORUS: CPT

## 2022-03-18 PROCEDURE — 85027 COMPLETE CBC AUTOMATED: CPT

## 2022-03-18 RX ORDER — TIOTROPIUM BROMIDE 18 UG/1
1 CAPSULE ORAL; RESPIRATORY (INHALATION)
Qty: 30 | Refills: 0
Start: 2022-03-18 | End: 2022-04-16

## 2022-03-18 RX ORDER — ERYTHROPOIETIN 10000 [IU]/ML
4000 INJECTION, SOLUTION INTRAVENOUS; SUBCUTANEOUS
Refills: 0 | Status: DISCONTINUED | OUTPATIENT
Start: 2022-03-18 | End: 2022-03-18

## 2022-03-18 RX ADMIN — Medication 1 TABLET(S): at 13:57

## 2022-03-18 RX ADMIN — Medication 1000 UNIT(S): at 13:58

## 2022-03-18 RX ADMIN — SEVELAMER CARBONATE 800 MILLIGRAM(S): 2400 POWDER, FOR SUSPENSION ORAL at 08:00

## 2022-03-18 RX ADMIN — Medication 650 MILLIGRAM(S): at 00:01

## 2022-03-18 RX ADMIN — Medication 1 MILLIGRAM(S): at 13:59

## 2022-03-18 RX ADMIN — Medication 325 MILLIGRAM(S): at 08:00

## 2022-03-18 RX ADMIN — OXYCODONE HYDROCHLORIDE 5 MILLIGRAM(S): 5 TABLET ORAL at 00:00

## 2022-03-18 RX ADMIN — SEVELAMER CARBONATE 800 MILLIGRAM(S): 2400 POWDER, FOR SUSPENSION ORAL at 13:58

## 2022-03-18 RX ADMIN — Medication 10 MILLIGRAM(S): at 05:25

## 2022-03-18 RX ADMIN — Medication 81 MILLIGRAM(S): at 13:57

## 2022-03-18 RX ADMIN — Medication 10 MILLIGRAM(S): at 13:57

## 2022-03-18 RX ADMIN — ERYTHROPOIETIN 4000 UNIT(S): 10000 INJECTION, SOLUTION INTRAVENOUS; SUBCUTANEOUS at 11:02

## 2022-03-18 RX ADMIN — Medication 25 MILLIGRAM(S): at 05:25

## 2022-03-18 RX ADMIN — AMLODIPINE BESYLATE 10 MILLIGRAM(S): 2.5 TABLET ORAL at 05:25

## 2022-03-18 RX ADMIN — FAMOTIDINE 20 MILLIGRAM(S): 10 INJECTION INTRAVENOUS at 05:26

## 2022-03-18 NOTE — PROGRESS NOTE ADULT - PROBLEM SELECTOR PLAN 3
- resume home meds:   - hydral 25 TID  - norvasc 10
- resume home meds:   - hydral 25 TID  - norvasc 10

## 2022-03-18 NOTE — DISCHARGE NOTE NURSING/CASE MANAGEMENT/SOCIAL WORK - PATIENT PORTAL LINK FT
You can access the FollowMyHealth Patient Portal offered by [facility] by registering at the following website: [email or web address]. By joining [facility]’s FollowMyHealth portal, you will also be able to view your health information using other applications (apps) compatible with our system.

## 2022-03-18 NOTE — PROGRESS NOTE ADULT - ASSESSMENT
Patient is a 61y Male whom presented to the hospital with shortness of breath for a day.  Has ESRD on HD at [facility]. He is non-compliant with HD prescription ( cuts time and misses treatments). also with dietary indiscretions.  In ED had pulmonary edema and hypoxia    # ESRD admitted with pulmonary edema elevated PROBNP . hemodialysis today with ultrafiltration as tolerated  Discussed compliance with HD prescription, salt and fluid restriction   # anemia of CKD. retacrit given on HD   # CKDMBD. CONT SEVELAMER    THANKS FOR THE CONSULT 
Patient is a 61y Male whom presented to the hospital with shortness of breath for a day.  Has ESRD on HD at [facility]. He is non-compliant with HD prescription ( cuts time and misses treatments). also with dietary indiscretions.  In ED had pulmonary edema and hypoxia    # ESRD, seen on  HD today Discussed compliance with HD prescription, salt and fluid restriction   # anemia of CKD. retacrit given on HD   # CKDMBD. CONT SEVELAMER  D/C planning.  Patient will resume HD in outpatient unit on Monday
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 Admit for HD. Dr. Mosher consulted.
62 yo M from [facility], Legally Blind 2/2 diabetic retinopathy, ESRD on HD Tue/Tr/Sat, CAD s/p MI, HLD, CHF, spinal stenosis, hyperparathyroidism, HTN, peripheral neuropathy, smoker's bronchitis and IDDM who BIB EMS from [facility] for SOB. Admit for HD. Dr. Mosher consulted.

## 2022-03-18 NOTE — PROGRESS NOTE ADULT - SUBJECTIVE AND OBJECTIVE BOX
PGY-1 Progress Note discussed with attending    PAGER #: [[phone]] TILL 5:00 PM  PLEASE CONTACT ON CALL TEAM:  - On Call Team (Please refer to Dejon) FROM 5:00 PM - 8:30PM  - Nightfloat Team FROM 8:30 -7:30 AM    CHIEF COMPLAINT & BRIEF HOSPITAL COURSE:    INTERVAL HPI/OVERNIGHT EVENTS:   Patient seen and examined at bedside. No acute events overnight. Patient denies any complaints. He is pending HD at 10am.    REVIEW OF SYSTEMS:  CONSTITUTIONAL: No fever, weight loss, or fatigue  RESPIRATORY: No cough, wheezing, chills or hemoptysis; No shortness of breath  CARDIOVASCULAR: No chest pain, palpitations, dizziness, or leg swelling  GASTROINTESTINAL: No abdominal pain. No nausea, vomiting, or hematemesis; No diarrhea or constipation. No melena or hematochezia.  GENITOURINARY: No dysuria or hematuria, urinary frequency  NEUROLOGICAL: No headaches, memory loss, loss of strength, numbness, or tremors  SKIN: No itching, burning, rashes, or lesions     acetaminophen     Tablet .. 650 milliGRAM(s) Oral every 6 hours PRN  ALBUTerol    90 MICROgram(s) HFA Inhaler 2 Puff(s) Inhalation every 6 hours PRN  amLODIPine   Tablet 10 milliGRAM(s) Oral daily  aspirin enteric coated 81 milliGRAM(s) Oral daily  cholecalciferol 1000 Unit(s) Oral daily  diphenhydrAMINE 25 milliGRAM(s) Oral at bedtime PRN  epoetin beverley-epbx (RETACRIT) Injectable 4000 Unit(s) IV Push <User Schedule>  famotidine    Tablet 20 milliGRAM(s) Oral two times a day  ferrous    sulfate 325 milliGRAM(s) Oral <User Schedule>  folic acid 1 milliGRAM(s) Oral daily  heparin   Injectable 5000 Unit(s) SubCutaneous every 8 hours  hydrALAZINE 25 milliGRAM(s) Oral three times a day  influenza   Vaccine 0.5 milliLiter(s) IntraMuscular once  insulin lispro (ADMELOG) corrective regimen sliding scale   SubCutaneous three times a day before meals  melatonin 3 milliGRAM(s) Oral at bedtime  metoclopramide 10 milliGRAM(s) Oral three times a day  Nephro-luann 1 Tablet(s) Oral daily  nortriptyline 10 milliGRAM(s) Oral at bedtime  oxyCODONE    IR 5 milliGRAM(s) Oral <User Schedule>  sevelamer carbonate 800 milliGRAM(s) Oral three times a day with meals  simvastatin 40 milliGRAM(s) Oral at bedtime  sodium zirconium cyclosilicate 10 Gram(s) Oral daily  tiotropium 18 MICROgram(s) Capsule 1 Capsule(s) Inhalation daily  zolpidem 5 milliGRAM(s) Oral at bedtime PRN      Vital Signs Last 24 Hrs  T(C): 36.8 (18 Mar 2022 07:36), Max: 37.2 (17 Mar 2022 16:05)  T(F): 98.2 (18 Mar 2022 07:36), Max: 98.9 (17 Mar 2022 16:05)  HR: 80 (18 Mar 2022 07:36) (80 - 94)  BP: 144/70 (18 Mar 2022 07:36) (131/69 - 153/81)  BP(mean): --  RR: 19 (18 Mar 2022 07:36) (17 - 20)  SpO2: 95% (18 Mar 2022 07:36) (94% - 95%)    PHYSICAL EXAMINATION:  GENERAL: NAD  HEAD:  Atraumatic, Normocephalic  EYES: haziness on left eye. conjunctiva and sclera clear  NECK: Supple, No JVD, Normal thyroid  CHEST/LUNG: Bilateral crackles; No rales, rhonchi, wheezing, or rubs  HEART: Regular rate and rhythm; No murmurs, rubs, or gallops  ABDOMEN: Soft, Nontender, Nondistended; Bowel sounds present  NERVOUS SYSTEM:  Alert & Oriented X3,   EXTREMITIES: L knee below-the-knee amputation.  2+ Peripheral Pulses, No clubbing, cyanosis, or edema  SKIN: warm dry                        9.9    2.58  )-----------( 77       ( 17 Mar 2022 11:56 )             31.7     03-17    136  |  99  |  23<H>  ----------------------------<  247<H>  4.6   |  30  |  7.15<H>    Ca    8.4      17 Mar 2022 11:56  Phos  3.3     03-17  Mg     2.7     03-17    TPro  6.3  /  Alb  3.0<L>  /  TBili  0.6  /  DBili  x   /  AST  34  /  ALT  19  /  AlkPhos  77  03-17    LIVER FUNCTIONS - ( 17 Mar 2022 11:56 )  Alb: 3.0 g/dL / Pro: 6.3 g/dL / ALK PHOS: 77 U/L / ALT: 19 U/L DA / AST: 34 U/L / GGT: x                   CAPILLARY BLOOD GLUCOSE      RADIOLOGY & ADDITIONAL TESTS:

## 2022-03-18 NOTE — CHART NOTE - NSCHARTNOTEFT_GEN_A_CORE
According to the nurse, patient has been refusing meds and vitals intermittently throughout the day  .  According to the patient, he refuses early morning (6AM) labs because he does not want to get blood drawn just as he wakes up from sleep.

## 2022-03-18 NOTE — PROGRESS NOTE ADULT - SUBJECTIVE AND OBJECTIVE BOX
[facility] : Progress Note :: [phone], (office [phone]),   Dr Mosher / Dr Washington / Dr Young / Dr Doll / Dr Varsha TURCIOS / Dr Tello / Dr Garcia / Dr Larry qiu  _____________________________________________________________________________________________  seen on HD  refusing to complete prescribed HD time     fish (Rash)  liver (Anaphylaxis)  No Known Drug Allergies    Hospital Medications:   MEDICATIONS  (STANDING):  amLODIPine   Tablet 10 milliGRAM(s) Oral daily  aspirin enteric coated 81 milliGRAM(s) Oral daily  cholecalciferol 1000 Unit(s) Oral daily  epoetin beverley-epbx (RETACRIT) Injectable 4000 Unit(s) IV Push <User Schedule>  famotidine    Tablet 20 milliGRAM(s) Oral two times a day  ferrous    sulfate 325 milliGRAM(s) Oral <User Schedule>  folic acid 1 milliGRAM(s) Oral daily  heparin   Injectable 5000 Unit(s) SubCutaneous every 8 hours  hydrALAZINE 25 milliGRAM(s) Oral three times a day  influenza   Vaccine 0.5 milliLiter(s) IntraMuscular once  insulin lispro (ADMELOG) corrective regimen sliding scale   SubCutaneous three times a day before meals  melatonin 3 milliGRAM(s) Oral at bedtime  metoclopramide 10 milliGRAM(s) Oral three times a day  Nephro-luann 1 Tablet(s) Oral daily  nortriptyline 10 milliGRAM(s) Oral at bedtime  oxyCODONE    IR 5 milliGRAM(s) Oral <User Schedule>  sevelamer carbonate 800 milliGRAM(s) Oral three times a day with meals  simvastatin 40 milliGRAM(s) Oral at bedtime  sodium zirconium cyclosilicate 10 Gram(s) Oral daily  tiotropium 18 MICROgram(s) Capsule 1 Capsule(s) Inhalation daily        VITALS:  T(F): 98.2 (03-18-22 @ 07:36), Max: 98.9 (03-17-22 @ 16:05)  HR: 80 (03-18-22 @ 07:36)  BP: 144/70 (03-18-22 @ 07:36)  RR: 19 (03-18-22 @ 07:36)  SpO2: 95% (03-18-22 @ 07:36)  Wt(kg): --    03-17 @ 07:01  -  03-18 @ 07:00  --------------------------------------------------------  IN: 300 mL / OUT: 0 mL / NET: 300 mL        PHYSICAL EXAM:  Constitutional: NAD  HEENT: anicteric sclera, oropharynx clear, MMM  Neck: No JVD  Respiratory: CTAB, no wheezes, rales or rhonchi  Cardiovascular: S1, S2, RRR  Gastrointestinal: BS+, soft, NT/ND  Extremities:  No peripheral edema  Neurological: A/O x 3, no focal deficits  Vascular Access: AVF canulated    LABS:  03-18    135  |  99  |  32<H>  ----------------------------<  173<H>  4.5   |  28  |  8.92<H>    Ca    8.1<L>      18 Mar 2022 09:49  Phos  3.9     03-18  Mg     3.0     03-18    TPro  6.0  /  Alb  2.9<L>  /  TBili  0.5  /  DBili      /  AST  14  /  ALT  16  /  AlkPhos  91  03-18    Creatinine Trend: 8.92 <--, 7.15 <--, 7.47 <--                        9.0    2.17  )-----------( x        ( 18 Mar 2022 09:49 )             28.5     Urine Studies:      RADIOLOGY & ADDITIONAL STUDIES:

## 2022-03-18 NOTE — PROGRESS NOTE ADULT - PROBLEM SELECTOR PLAN 1
- p/w SOB, saturating 100% on RA but prefers to keep NRBM on for comfort  - BNP elevated 23735, contributed by fluid overload and CKD  - Trop slight elevation 145.7, trended down. Contributed by CKD  - CXR with mild interstitial fluid in b/l lung bases (wet read)  - EKG NSR HR 95, LVH, TWI V1-2, early take off V4-6  - admit for HD  - titrate off O2  - trend troponin  - resume home meds & inhalers  - Improved 3/17/22  - NEPHRO Dr. Mosher - p/w SOB, saturating 100% on RA but prefers to keep NRBM on for comfort  - From ESRD  - BNP elevated 72111, contributed by fluid overload and CKD  - Trop slight elevation 145.7, trended down. Contributed by CKD  - CXR with mild interstitial fluid in b/l lung bases (wet read)  - EKG NSR HR 95, LVH, TWI V1-2, early take off V4-6  - admit for HD  - titrate off O2  - trend troponin  - resume home meds & inhalers  - Improved 3/17/22  - NEPHRO Dr. Mosher

## 2022-03-18 NOTE — DISCHARGE NOTE NURSING/CASE MANAGEMENT/SOCIAL WORK - NSDCPEFALRISK_GEN_ALL_CORE
For information on Fall & Injury Prevention, visit: [email or web address] OR  [email or web address] OR  https://www.cdc.gov/steadi/patient.html

## 2022-03-28 ENCOUNTER — APPOINTMENT (OUTPATIENT)
Dept: UROLOGY | Facility: CLINIC | Age: 62
End: 2022-03-28
Payer: MEDICAID

## 2022-03-28 DIAGNOSIS — R97.20 ELEVATED PROSTATE, SPECIFIC ANTIGEN [PSA]: ICD-10-CM

## 2022-03-28 PROCEDURE — 99213 OFFICE O/P EST LOW 20 MIN: CPT

## 2022-04-03 NOTE — PHYSICAL EXAM
[General Appearance - Well Developed] : well developed [General Appearance - Well Nourished] : well nourished [Normal Appearance] : normal appearance [Well Groomed] : well groomed [General Appearance - In No Acute Distress] : no acute distress [Abdomen Soft] : soft [Abdomen Tenderness] : non-tender [Costovertebral Angle Tenderness] : no ~M costovertebral angle tenderness [Urethral Meatus] : meatus normal [Penis Abnormality] : normal circumcised penis [Urinary Bladder Findings] : the bladder was normal on palpation [Scrotum] : the scrotum was normal [Epididymis] : the epididymides were normal [Testes Tenderness] : no tenderness of the testes [Testes Mass (___cm)] : there were no testicular masses [Prostate Tenderness] : the prostate was not tender [No Prostate Nodules] : no prostate nodules [Prostate Size ___ gm] : prostate size [unfilled] gm [Edema] : no peripheral edema [] : no respiratory distress [Respiration, Rhythm And Depth] : normal respiratory rhythm and effort [Exaggerated Use Of Accessory Muscles For Inspiration] : no accessory muscle use [Oriented To Time, Place, And Person] : oriented to person, place, and time [Affect] : the affect was normal [Mood] : the mood was normal [Not Anxious] : not anxious [No Palpable Adenopathy] : no palpable adenopathy [FreeTextEntry1] : Uses walker, history of leg amputation

## 2022-04-03 NOTE — ASSESSMENT
[FreeTextEntry1] : 62 yo M with elevated PSA\par \par - No labwork available for review\par - PSA today\par - Discussed possible etiologies for elevated PSA including benign vs. malignancy\par - Discussed pros and cons of proceeding with a prostate needle biopsy. Discussed risk and benefits including infection, hematochezia, hematuria, hematospermia as well as specificity and sensitivity of the biopsy. \par - Discussed the role of prostate MRI in the workup of elevated PSA as well\par - Once elevated PSA confirmed, will plan for MRI

## 2022-04-03 NOTE — HISTORY OF PRESENT ILLNESS
[FreeTextEntry1] : 58 yo M presents with 5 month history of intermittent gross hematuria\par Sometimes dysuria\par History of ESRD and on HD for the last 8 months\par Makes about a cup of urine per day\par No prior history of stones, UTI or prostate issues\par \par 1/13/20 Interval history: Pt continues to have intermittent gross hematuria\par Cysto today showed abnormal bladder mucosa\par \par 3/2/20 Interval history: Pt is s/p bladder biopsy and fulguration\par Continues to have some intermittent hematuria but per pt, seems to have improved.\par Apparently, pt has not taken any of the abx prescribed previously stating that his brother hasn't been able to pick it up for him yet\par \par 5/18/20 Interval history: Doing well since last visit\par No more gross hematuria since vinishing full course of abx\par Does have intermittent foul smelling urine - no dysuria, no fever\par Produces about 1 cup of urine per day\par Admits he doesn't drink any water at all - just a couple sips with meds\par \par 3/28/22 Interval history: no urinary issues\par No gross hematuria since last visit\par Referred for recent PSA of 6.2 per referral

## 2022-04-11 LAB — PSA SERPL-MCNC: 3.63 NG/ML

## 2022-04-13 ENCOUNTER — OUTPATIENT (OUTPATIENT)
Dept: OUTPATIENT SERVICES | Facility: HOSPITAL | Age: 62
LOS: 1 days | End: 2022-04-13
Payer: MEDICAID

## 2022-04-13 DIAGNOSIS — I25.10 ATHEROSCLEROTIC HEART DISEASE OF NATIVE CORONARY ARTERY WITHOUT ANGINA PECTORIS: ICD-10-CM

## 2022-04-13 DIAGNOSIS — R06.00 DYSPNEA, UNSPECIFIED: ICD-10-CM

## 2022-04-13 DIAGNOSIS — Z89.512 ACQUIRED ABSENCE OF LEFT LEG BELOW KNEE: Chronic | ICD-10-CM

## 2022-04-13 DIAGNOSIS — Z98.890 OTHER SPECIFIED POSTPROCEDURAL STATES: Chronic | ICD-10-CM

## 2022-04-13 DIAGNOSIS — Z98.41 CATARACT EXTRACTION STATUS, RIGHT EYE: Chronic | ICD-10-CM

## 2022-04-13 DIAGNOSIS — Z87.448 PERSONAL HISTORY OF OTHER DISEASES OF URINARY SYSTEM: Chronic | ICD-10-CM

## 2022-04-13 DIAGNOSIS — Z98.42 CATARACT EXTRACTION STATUS, LEFT EYE: Chronic | ICD-10-CM

## 2022-04-13 PROCEDURE — 78452 HT MUSCLE IMAGE SPECT MULT: CPT | Mod: MH

## 2022-04-13 PROCEDURE — 93017 CV STRESS TEST TRACING ONLY: CPT

## 2022-04-13 PROCEDURE — A9502: CPT

## 2022-04-18 ENCOUNTER — APPOINTMENT (OUTPATIENT)
Dept: VASCULAR SURGERY | Facility: CLINIC | Age: 62
End: 2022-04-18
Payer: MEDICAID

## 2022-04-18 PROCEDURE — 99213 OFFICE O/P EST LOW 20 MIN: CPT

## 2022-04-18 PROCEDURE — 93990 DOPPLER FLOW TESTING: CPT

## 2022-04-18 PROCEDURE — 93922 UPR/L XTREMITY ART 2 LEVELS: CPT

## 2022-04-18 NOTE — DISCUSSION/SUMMARY
[FreeTextEntry1] : 62 yo male with history of esrd on hd presents for follow up \par duplex shows patent avf with 50-75% in stent stenosis at the shoulder and 50% stenosis at the juxta anastomosis with flow rate of 2002 \par \par steal study shows no evidence of compromised flow to the hand\par \par pt with palpable right dp and pop \par \par will continue to monitor given no issues with hd \par pt to follow up in 3-4 months with repeat duplex\par pt advised to schedule appointment with derm for evaluation of hyperkeratotic plaques

## 2022-04-18 NOTE — PHYSICAL EXAM
[Normal] : no acute distress, well-nourished, well developed, well appearing [Thrill] : thrill [Pulsatile Thrill] : no pulsatile thrill [Hand well perfused] : hand well perfused [Ulcer] : no ulcer [2+] : right 2+ [de-identified] : hyperkeratotic plaque over the right mid lateral shin and right 5th mcp joint

## 2022-04-18 NOTE — HISTORY OF PRESENT ILLNESS
[FreeTextEntry1] : 62 yo male with history of esrd on hd presents for follow up \par pt reports puritis along the side of his right upper arm where there is a shallow based ulcer likely from the tape after hd\par  [] : right brachiocephalic fistula

## 2022-04-20 NOTE — REASON FOR VISIT
[Consultation] : a consultation visit [Initial Visit ___] : [unfilled] is here today for an initial visit  for [unfilled]

## 2022-04-27 ENCOUNTER — INPATIENT (INPATIENT)
Facility: HOSPITAL | Age: 62
LOS: 5 days | Discharge: NURSING, REHAB OR LONG-TERM CARE | DRG: 377 | End: 2022-05-03
Attending: INTERNAL MEDICINE | Admitting: INTERNAL MEDICINE
Payer: MEDICAID

## 2022-04-27 VITALS
TEMPERATURE: 99 F | HEIGHT: 66 IN | WEIGHT: 117.95 LBS | HEART RATE: 98 BPM | OXYGEN SATURATION: 95 % | DIASTOLIC BLOOD PRESSURE: 82 MMHG | RESPIRATION RATE: 20 BRPM | SYSTOLIC BLOOD PRESSURE: 138 MMHG

## 2022-04-27 DIAGNOSIS — Z29.9 ENCOUNTER FOR PROPHYLACTIC MEASURES, UNSPECIFIED: ICD-10-CM

## 2022-04-27 DIAGNOSIS — N18.6 END STAGE RENAL DISEASE: ICD-10-CM

## 2022-04-27 DIAGNOSIS — K92.0 HEMATEMESIS: ICD-10-CM

## 2022-04-27 DIAGNOSIS — E11.9 TYPE 2 DIABETES MELLITUS WITHOUT COMPLICATIONS: ICD-10-CM

## 2022-04-27 DIAGNOSIS — Z98.41 CATARACT EXTRACTION STATUS, RIGHT EYE: Chronic | ICD-10-CM

## 2022-04-27 DIAGNOSIS — Z98.890 OTHER SPECIFIED POSTPROCEDURAL STATES: Chronic | ICD-10-CM

## 2022-04-27 DIAGNOSIS — I10 ESSENTIAL (PRIMARY) HYPERTENSION: ICD-10-CM

## 2022-04-27 DIAGNOSIS — Z87.448 PERSONAL HISTORY OF OTHER DISEASES OF URINARY SYSTEM: Chronic | ICD-10-CM

## 2022-04-27 DIAGNOSIS — Z98.42 CATARACT EXTRACTION STATUS, LEFT EYE: Chronic | ICD-10-CM

## 2022-04-27 DIAGNOSIS — Z89.512 ACQUIRED ABSENCE OF LEFT LEG BELOW KNEE: Chronic | ICD-10-CM

## 2022-04-27 DIAGNOSIS — R79.89 OTHER SPECIFIED ABNORMAL FINDINGS OF BLOOD CHEMISTRY: ICD-10-CM

## 2022-04-27 LAB
ALBUMIN SERPL ELPH-MCNC: 3 G/DL — LOW (ref 3.5–5)
ALP SERPL-CCNC: 74 U/L — SIGNIFICANT CHANGE UP (ref 40–120)
ALT FLD-CCNC: 29 U/L DA — SIGNIFICANT CHANGE UP (ref 10–60)
ANION GAP SERPL CALC-SCNC: 8 MMOL/L — SIGNIFICANT CHANGE UP (ref 5–17)
APTT BLD: 25.3 SEC — LOW (ref 27.5–35.5)
AST SERPL-CCNC: 32 U/L — SIGNIFICANT CHANGE UP (ref 10–40)
BASOPHILS # BLD AUTO: 0.03 K/UL — SIGNIFICANT CHANGE UP (ref 0–0.2)
BASOPHILS NFR BLD AUTO: 0.7 % — SIGNIFICANT CHANGE UP (ref 0–2)
BILIRUB SERPL-MCNC: 0.5 MG/DL — SIGNIFICANT CHANGE UP (ref 0.2–1.2)
BLD GP AB SCN SERPL QL: SIGNIFICANT CHANGE UP
BUN SERPL-MCNC: 65 MG/DL — HIGH (ref 7–18)
CALCIUM SERPL-MCNC: 8.7 MG/DL — SIGNIFICANT CHANGE UP (ref 8.4–10.5)
CHLORIDE SERPL-SCNC: 101 MMOL/L — SIGNIFICANT CHANGE UP (ref 96–108)
CO2 SERPL-SCNC: 29 MMOL/L — SIGNIFICANT CHANGE UP (ref 22–31)
CREAT SERPL-MCNC: 8.61 MG/DL — HIGH (ref 0.5–1.3)
EGFR: 7 ML/MIN/1.73M2 — LOW
EOSINOPHIL # BLD AUTO: 0.41 K/UL — SIGNIFICANT CHANGE UP (ref 0–0.5)
EOSINOPHIL NFR BLD AUTO: 9.9 % — HIGH (ref 0–6)
GLUCOSE BLDC GLUCOMTR-MCNC: 173 MG/DL — HIGH (ref 70–99)
GLUCOSE SERPL-MCNC: 179 MG/DL — HIGH (ref 70–99)
HCT VFR BLD CALC: 37.8 % — LOW (ref 39–50)
HGB BLD-MCNC: 11.6 G/DL — LOW (ref 13–17)
IMM GRANULOCYTES NFR BLD AUTO: 0.2 % — SIGNIFICANT CHANGE UP (ref 0–1.5)
INR BLD: 1.02 RATIO — SIGNIFICANT CHANGE UP (ref 0.88–1.16)
LIDOCAIN IGE QN: 84 U/L — SIGNIFICANT CHANGE UP (ref 73–393)
LYMPHOCYTES # BLD AUTO: 0.68 K/UL — LOW (ref 1–3.3)
LYMPHOCYTES # BLD AUTO: 16.5 % — SIGNIFICANT CHANGE UP (ref 13–44)
MAGNESIUM SERPL-MCNC: 3 MG/DL — HIGH (ref 1.6–2.6)
MCHC RBC-ENTMCNC: 29.2 PG — SIGNIFICANT CHANGE UP (ref 27–34)
MCHC RBC-ENTMCNC: 30.7 GM/DL — LOW (ref 32–36)
MCV RBC AUTO: 95.2 FL — SIGNIFICANT CHANGE UP (ref 80–100)
MONOCYTES # BLD AUTO: 0.28 K/UL — SIGNIFICANT CHANGE UP (ref 0–0.9)
MONOCYTES NFR BLD AUTO: 6.8 % — SIGNIFICANT CHANGE UP (ref 2–14)
NEUTROPHILS # BLD AUTO: 2.72 K/UL — SIGNIFICANT CHANGE UP (ref 1.8–7.4)
NEUTROPHILS NFR BLD AUTO: 65.9 % — SIGNIFICANT CHANGE UP (ref 43–77)
NRBC # BLD: 0 /100 WBCS — SIGNIFICANT CHANGE UP (ref 0–0)
NT-PROBNP SERPL-SCNC: HIGH PG/ML (ref 0–125)
PLATELET # BLD AUTO: 70 K/UL — LOW (ref 150–400)
POTASSIUM SERPL-MCNC: 6.4 MMOL/L — CRITICAL HIGH (ref 3.5–5.3)
POTASSIUM SERPL-SCNC: 6.4 MMOL/L — CRITICAL HIGH (ref 3.5–5.3)
PROT SERPL-MCNC: 6.5 G/DL — SIGNIFICANT CHANGE UP (ref 6–8.3)
PROTHROM AB SERPL-ACNC: 12.2 SEC — SIGNIFICANT CHANGE UP (ref 10.5–13.4)
RBC # BLD: 3.97 M/UL — LOW (ref 4.2–5.8)
RBC # FLD: 17.2 % — HIGH (ref 10.3–14.5)
SARS-COV-2 RNA SPEC QL NAA+PROBE: SIGNIFICANT CHANGE UP
SODIUM SERPL-SCNC: 138 MMOL/L — SIGNIFICANT CHANGE UP (ref 135–145)
TROPONIN I, HIGH SENSITIVITY RESULT: 73.1 NG/L — SIGNIFICANT CHANGE UP
WBC # BLD: 4.13 K/UL — SIGNIFICANT CHANGE UP (ref 3.8–10.5)
WBC # FLD AUTO: 4.13 K/UL — SIGNIFICANT CHANGE UP (ref 3.8–10.5)

## 2022-04-27 PROCEDURE — 71045 X-RAY EXAM CHEST 1 VIEW: CPT | Mod: 26

## 2022-04-27 PROCEDURE — 99285 EMERGENCY DEPT VISIT HI MDM: CPT

## 2022-04-27 RX ORDER — PANTOPRAZOLE SODIUM 20 MG/1
40 TABLET, DELAYED RELEASE ORAL EVERY 12 HOURS
Refills: 0 | Status: DISCONTINUED | OUTPATIENT
Start: 2022-04-27 | End: 2022-05-03

## 2022-04-27 RX ORDER — CEFTRIAXONE 500 MG/1
1000 INJECTION, POWDER, FOR SOLUTION INTRAMUSCULAR; INTRAVENOUS ONCE
Refills: 0 | Status: COMPLETED | OUTPATIENT
Start: 2022-04-27 | End: 2022-04-27

## 2022-04-27 RX ORDER — SEVELAMER CARBONATE 2400 MG/1
2400 POWDER, FOR SUSPENSION ORAL EVERY 8 HOURS
Refills: 0 | Status: DISCONTINUED | OUTPATIENT
Start: 2022-04-27 | End: 2022-05-03

## 2022-04-27 RX ORDER — SODIUM ZIRCONIUM CYCLOSILICATE 10 G/10G
10 POWDER, FOR SUSPENSION ORAL DAILY
Refills: 0 | Status: DISCONTINUED | OUTPATIENT
Start: 2022-04-27 | End: 2022-04-30

## 2022-04-27 RX ORDER — FOLIC ACID 0.8 MG
1 TABLET ORAL DAILY
Refills: 0 | Status: DISCONTINUED | OUTPATIENT
Start: 2022-04-27 | End: 2022-05-03

## 2022-04-27 RX ORDER — AMLODIPINE BESYLATE 2.5 MG/1
10 TABLET ORAL DAILY
Refills: 0 | Status: DISCONTINUED | OUTPATIENT
Start: 2022-04-27 | End: 2022-05-03

## 2022-04-27 RX ORDER — LANOLIN ALCOHOL/MO/W.PET/CERES
1 CREAM (GRAM) TOPICAL
Qty: 0 | Refills: 0 | DISCHARGE

## 2022-04-27 RX ORDER — INSULIN LISPRO 100/ML
VIAL (ML) SUBCUTANEOUS
Refills: 0 | Status: DISCONTINUED | OUTPATIENT
Start: 2022-04-27 | End: 2022-05-03

## 2022-04-27 RX ORDER — PANTOPRAZOLE SODIUM 20 MG/1
80 TABLET, DELAYED RELEASE ORAL ONCE
Refills: 0 | Status: COMPLETED | OUTPATIENT
Start: 2022-04-27 | End: 2022-04-27

## 2022-04-27 RX ORDER — ONDANSETRON 8 MG/1
4 TABLET, FILM COATED ORAL ONCE
Refills: 0 | Status: COMPLETED | OUTPATIENT
Start: 2022-04-27 | End: 2022-04-27

## 2022-04-27 RX ORDER — SIMVASTATIN 20 MG/1
40 TABLET, FILM COATED ORAL AT BEDTIME
Refills: 0 | Status: DISCONTINUED | OUTPATIENT
Start: 2022-04-27 | End: 2022-05-03

## 2022-04-27 RX ORDER — LEVOTHYROXINE SODIUM 125 MCG
25 TABLET ORAL DAILY
Refills: 0 | Status: DISCONTINUED | OUTPATIENT
Start: 2022-04-27 | End: 2022-05-03

## 2022-04-27 RX ORDER — HYDRALAZINE HCL 50 MG
25 TABLET ORAL THREE TIMES A DAY
Refills: 0 | Status: DISCONTINUED | OUTPATIENT
Start: 2022-04-27 | End: 2022-05-03

## 2022-04-27 RX ORDER — ZOLPIDEM TARTRATE 10 MG/1
5 TABLET ORAL AT BEDTIME
Refills: 0 | Status: DISCONTINUED | OUTPATIENT
Start: 2022-04-27 | End: 2022-05-03

## 2022-04-27 RX ORDER — ERGOCALCIFEROL 1.25 MG/1
1 CAPSULE ORAL
Qty: 0 | Refills: 0 | DISCHARGE

## 2022-04-27 RX ORDER — INSULIN LISPRO 100/ML
VIAL (ML) SUBCUTANEOUS AT BEDTIME
Refills: 0 | Status: DISCONTINUED | OUTPATIENT
Start: 2022-04-27 | End: 2022-05-03

## 2022-04-27 RX ORDER — SUCRALFATE 1 G
1 TABLET ORAL
Refills: 0 | Status: DISCONTINUED | OUTPATIENT
Start: 2022-04-27 | End: 2022-05-03

## 2022-04-27 RX ORDER — NORTRIPTYLINE HYDROCHLORIDE 10 MG/1
10 CAPSULE ORAL DAILY
Refills: 0 | Status: DISCONTINUED | OUTPATIENT
Start: 2022-04-27 | End: 2022-05-03

## 2022-04-27 RX ORDER — METOPROLOL TARTRATE 50 MG
12.5 TABLET ORAL
Refills: 0 | Status: DISCONTINUED | OUTPATIENT
Start: 2022-04-27 | End: 2022-05-03

## 2022-04-27 RX ADMIN — NORTRIPTYLINE HYDROCHLORIDE 10 MILLIGRAM(S): 10 CAPSULE ORAL at 18:27

## 2022-04-27 RX ADMIN — SIMVASTATIN 40 MILLIGRAM(S): 20 TABLET, FILM COATED ORAL at 21:58

## 2022-04-27 RX ADMIN — Medication 1 MILLIGRAM(S): at 18:27

## 2022-04-27 RX ADMIN — CEFTRIAXONE 100 MILLIGRAM(S): 500 INJECTION, POWDER, FOR SOLUTION INTRAMUSCULAR; INTRAVENOUS at 11:38

## 2022-04-27 RX ADMIN — Medication 25 MILLIGRAM(S): at 21:58

## 2022-04-27 RX ADMIN — PANTOPRAZOLE SODIUM 40 MILLIGRAM(S): 20 TABLET, DELAYED RELEASE ORAL at 18:26

## 2022-04-27 RX ADMIN — AMLODIPINE BESYLATE 10 MILLIGRAM(S): 2.5 TABLET ORAL at 18:27

## 2022-04-27 RX ADMIN — Medication 1 GRAM(S): at 18:26

## 2022-04-27 RX ADMIN — Medication 12.5 MILLIGRAM(S): at 18:30

## 2022-04-27 RX ADMIN — SEVELAMER CARBONATE 2400 MILLIGRAM(S): 2400 POWDER, FOR SUSPENSION ORAL at 21:58

## 2022-04-27 RX ADMIN — PANTOPRAZOLE SODIUM 80 MILLIGRAM(S): 20 TABLET, DELAYED RELEASE ORAL at 11:39

## 2022-04-27 RX ADMIN — ONDANSETRON 4 MILLIGRAM(S): 8 TABLET, FILM COATED ORAL at 11:39

## 2022-04-27 RX ADMIN — Medication 25 MICROGRAM(S): at 18:27

## 2022-04-27 NOTE — ED PROVIDER NOTE - CLINICAL SUMMARY MEDICAL DECISION MAKING FREE TEXT BOX
61M presenting with hematemesis. will get labs, antibiotics, protonix. will likely require admission.

## 2022-04-27 NOTE — H&P ADULT - ASSESSMENT
Patient is a 60 yo M from [facility] with PMH of hypertension, anemia, CAD, hyperlipidemia, diabetes mellitus, GERD and adrenal insuffiencey, ESRD on HD T,Th,S reports to the ED for vomiting blood. Admitted for  Patient is a 60 yo M from [facility] with PMH of hypertension, anemia, CAD, hyperlipidemia, diabetes mellitus, GERD and adrenal insuffiencey, ESRD on HD T,Th,S reports to the ED for vomiting blood. Admitted for hematemesis.

## 2022-04-27 NOTE — CONSULT NOTE ADULT - SUBJECTIVE AND OBJECTIVE BOX
[facility] : Progress Note :: [phone], (office [phone]),   Dr Mosher / Dr Washington / Dr Young / Dr Doll / Dr Varsha TURCIOS / Dr Tello / Dr Garcia / Dr Larry qiu  _____________________________________________________________________________________________  Patient is a 61y Male whom presented to the hospital with vomitting blood.  Has ESRD on HD TTS at [facility]. Poor compliance with HD and renal diet.  He is a poor historian, admitted with vomitting blood  renal consulted fro hyperkalemia in setting of ESRD   not in respiratory distress    PAST MEDICAL & SURGICAL HISTORY:  Hypertension    Adrenal insufficiency    Anemia    Glaucoma    Coronary artery disease    HLD (hyperlipidemia)    Peripheral vascular disease    Spinal stenosis of lumbosacral region    Hyperparathyroidism    Diabetes mellitus    Diabetic neuropathy    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    Osteoarthritis    Vision loss of left eye    ESRD on hemodialysis    Cataract  both eyes - hx of sx done    BPH (benign prostatic hyperplasia)    UTI (urinary tract infection)  hx of    Bladder mass  hx of    H/O hematuria    Osteoporosis    Vision loss of right eye    Depression    Chronic GERD    Osteomyelitis of vertebra    Below knee amputation status, left  2012- pt is wearing prostesis    History of right cataract extraction    History of left cataract extraction    S/P arteriovenous (AV) fistula creation  right arm brachiocephalic arteriovenous fistula on 11/08/2018    H/O hematuria  s/p bladder bx and fulguration 2/25/2020    H/O transurethral destruction of bladder lesion  2020    History of excision of mass  back mass on 03/31/2021      fish (Rash)  liver (Anaphylaxis)  No Known Drug Allergies    Home Medications Reviewed  Hospital Medications:   MEDICATIONS  (STANDING):  pantoprazole  Injectable 40 milliGRAM(s) IV Push every 12 hours    SOCIAL HISTORY:  Denies ETOh,Smoking,   FAMILY HISTORY:  Family history of cirrhosis of liver (Mother)    Family history of renal failure (Sibling)    Family history of hypertension (Sibling)    Family history of diabetes mellitus (Sibling)    History of substance abuse in sibling (Sibling)        VITALS:  T(F): 99.3 (04-27-22 @ 10:43), Max: 99.3 (04-27-22 @ 10:43)  HR: 104 (04-27-22 @ 10:43)  BP: 143/82 (04-27-22 @ 10:43)  RR: 19 (04-27-22 @ 10:43)  SpO2: 94% (04-27-22 @ 10:43)  Wt(kg): --    Height (cm): 167.6 (04-27 @ 09:22)  Weight (kg): 53.5 (04-27 @ 09:22)  BMI (kg/m2): 19 (04-27 @ 09:22)  BSA (m2): 1.6 (04-27 @ 09:22)    PHYSICAL EXAM:  Constitutional: NAD  HEENT: anicteric sclera, oropharynx clear.  Neck: No JVD  Respiratory: CTAB, no wheezes, rales or rhonchi  Cardiovascular: S1, S2, RRR  Gastrointestinal: BS+, soft, NT/ND  Extremities:  No peripheral edema  Neurological: A/O x 3, no focal deficits  : No CVA tenderness. No [place].   Skin: No rashes  Vascular Access: RUEAVF with thrill  and bruit    LABS:  04-27    138  |  101  |  65<H>  ----------------------------<  179<H>  6.4<HH>   |  29  |  8.61<H>    Ca    8.7      27 Apr 2022 11:02  Mg     3.0     04-27    TPro  6.5  /  Alb  3.0<L>  /  TBili  0.5  /  DBili      /  AST  32  /  ALT  29  /  AlkPhos  74  04-27    Creatinine Trend: 8.61 <--                        11.6   4.13  )-----------( 70       ( 27 Apr 2022 11:02 )             37.8     Urine Studies:      RADIOLOGY & ADDITIONAL STUDIES:

## 2022-04-27 NOTE — ED PROVIDER NOTE - PHYSICAL EXAMINATION
General: lethargic appearing male, no acute distress   HEENT: normocephalic, atraumatic, red blood in oropharynx    Respiratory: normal work of breathing, lungs clear to auscultation bilaterally   Cardiac: regular rate and rhythm, right arm AV fistula   Abdomen: soft, non-tender, no guarding or rebound   MSK: no swelling or tenderness of lower extremities, moving all extremities spontaneously   Skin: warm, dry   Neuro: A&Ox3  Psych: appropriate affect

## 2022-04-27 NOTE — ED ADULT NURSE NOTE - ED STAT RN HANDOFF DETAILS
Patient admitted to medicine non complaint with staff refusing v/s. Report given to floor RN. Patient to be transported to floor by transporter via stretcher stable in no acute distress safety maintained.

## 2022-04-27 NOTE — H&P ADULT - PROBLEM SELECTOR PLAN 3
ProBNP elevated at 27k  CXR improved compared to the previous one on March 16th  pt will get HD tomorrow to remove excess fluid

## 2022-04-27 NOTE — ED PROVIDER NOTE - OBJECTIVE STATEMENT
61M, pmh of DM, Legally Blind 2/2 diabetic retinopathy, ESRD on HD Tue/Tr/Sat, CAD s/p MI, HLD, CHF, spinal stenosis, hyperparathyroidism, HTN, peripheral neuropathy, bronchitis, presenting with hematemesis. patient reports three episodes of bloody vomiting today. simial episode several years ago. no fever, chest pain, trouble breathing, abdominal pain. completed dialysis yesterday.

## 2022-04-27 NOTE — ED ADULT NURSE NOTE - OBJECTIVE STATEMENT
Patient present to ED from nursing home for vomiting blood since yesterday, patient notes he has similar episode last year . Patient does not want to answer questions asked screaming at times to staff. Patient is a dialysis patient T/Th/sat , graft to right arm pink band on

## 2022-04-27 NOTE — H&P ADULT - HISTORY OF PRESENT ILLNESS
Patient is a 60 yo M from [facility] with PMH of hypertension, anemia, CAD, hyperlipidemia, diabetes mellitus, GERD and adrenal insuffiencey, ESRD on HD T,Th,S reports to the ED for vomiting blood. Patient noted to  have one episode of dark vomiting and complains of nausea. Patient denies any abdominal pain, dysphagia, hematochezia or melena. Patient denies any changes to appetite or weight. Patient denies any prior upper endoscopy or prior episodes. Patient also missed dialysis yesterday. Patient, however, states he had dialysis yesterday and is scheduled for the next dialysis appointment on Saturday. Patient's dialysis schedule is T, Th, S. Patient denies any cp, sob or palpitations. Denies lower extremity swelling. Denies fevers or chills, sob or cough.  Patient is a 60 yo M from [facility] with PMH of prior upper GI bleed, hypertension, anemia, CAD, hyperlipidemia, diabetes mellitus, GERD and adrenal insuffiencey, ESRD on HD T,Th,S reports to the ED for vomiting blood. Patient noted to  have one episode of dark vomiting and complains of nausea. Patient denies any abdominal pain, dysphagia, hematochezia or melena. No c/o chest pain, shortness of breath, fever, headache, urinary complaints. No recent travel/ change in meds.

## 2022-04-27 NOTE — H&P ADULT - NSHPPHYSICALEXAM_GEN_ALL_CORE
Vital Signs Last 24 Hrs  T(C): 37.4 (27 Apr 2022 10:43), Max: 37.4 (27 Apr 2022 10:43)  T(F): 99.3 (27 Apr 2022 10:43), Max: 99.3 (27 Apr 2022 10:43)  HR: 104 (27 Apr 2022 10:43) (98 - 104)  BP: 143/82 (27 Apr 2022 10:43) (138/82 - 143/82)  RR: 19 (27 Apr 2022 10:43) (19 - 20)  SpO2: 94% (27 Apr 2022 10:43) (94% - 95%)    GENERAL: NAD, lying in bed comfortably  HEAD:  Atraumatic, Normocephalic  EYES: EOMI, PERRLA, conjunctiva and sclera clear  ENT: Moist mucous membranes  NECK: Supple, No JVD  CHEST/LUNG: Clear to auscultation bilaterally; No rales, rhonchi, wheezing, or rubs. Unlabored respirations  HEART: Regular rate and rhythm; No murmurs, rubs, or gallops  ABDOMEN: Bowel sounds present; Soft, Nontender, Nondistended. No hepatomegaly  EXTREMITIES:  2+ Peripheral Pulses, brisk capillary refill. No clubbing, cyanosis, or edema  NERVOUS SYSTEM:  Alert & Oriented X3, speech clear. No deficits   MSK: FROM all 4 extremities, full and equal strength  SKIN: No rashes or lesions

## 2022-04-27 NOTE — PATIENT PROFILE ADULT - FUNCTIONAL SCREEN CURRENT LEVEL: SWALLOWING (IF SCORE 2 OR MORE FOR ANY ITEM, CONSULT REHAB SERVICES), MLM)
Visit Information    Have you changed or started any medications since your last visit including any over-the-counter medicines, vitamins, or herbal medicines? no   Are you having any side effects from any of your medications? -  no  Have you stopped taking any of your medications? Is so, why? -  no    Have you seen any other physician or provider since your last visit? Yes - Records Obtained  Have you had any other diagnostic tests since your last visit? No  Have you been seen in the emergency room and/or had an admission to a hospital since we last saw you? No  Have you had your routine dental cleaning in the past 6 months? yes     Have you activated your [employer] account? If not, what are your barriers?  Yes     Patient Care Team:  Anita Marsh MD as PCP - General (Internal Medicine)  Anita Marsh MD as PCP - [facility]  Gianfranco Alfonso MD as Consulting Physician (Dermatology)  Torie Schulz MD as Consulting Physician (Urology)  Cassandra Palencia as Consulting Physician (Colon and Rectal Surgery)  Jonny Contreras MD as Consulting Physician (Orthopedic Surgery)  Hiren Hardin MD as Consulting Physician (Dermatology)    Medical History Review  Past Medical, Family, and Social History reviewed and does contribute to the patient presenting condition    Health Maintenance   Topic Date Due    HIV screen  07/19/2021 (Originally 6/14/1971)    COVID-19 Vaccine (2 - Moderna 2-dose series) 04/01/2021    Breast cancer screen  09/10/2022    Cervical cancer screen  09/13/2022    Lipid screen  02/06/2025    Colon cancer screen colonoscopy  11/16/2025    DTaP/Tdap/Td vaccine (3 - Td) 08/21/2028    Flu vaccine  Completed    Shingles Vaccine  Completed    Hepatitis C screen  Completed    Hepatitis A vaccine  Aged Out    Hepatitis B vaccine  Aged Out    Hib vaccine  Aged Out    Meningococcal (ACWY) vaccine  Aged Out    Pneumococcal 0-64 years Vaccine  Aged Out     Chief Complaint   Patient presents with    Annual Exam     Pt is here for physical and denies any other concerns at this time. ANNUAL EXAM NOTE    SUBJECTIVE:   Cesar Webb is a 62 y.o. female patient who   comes for complaints of  comprehensive evaluation of multiple problems and annual physical exam.    Specialities pt is following with:  Physical therapy- carpal tunnel  ENT- Dr Kim Arenas- tinnitus      Depression and anxiety screens negative  Denies feeling down depressed or hopeless. Denies loss of interest or pleasure in doing things. Chronic conditions being monitored:  Chronic low back pain  Well controlled  Doing regular exercise  Not needing any OTC meds      Concerns today:  Feels well today      Exercises regularly -  Yes, three times a day  Last mammogram was sept 2020  Last DEXA was 2013- normal  Colon cancer screening is up to date - yes- 11/2015- was told at the time to repeat in 10yr  Calcium intake reviewed today, dietary sources discussed in detail. Taking   Vitamin D supplementation: recommended.    Patient feels that they are currently not at risk for falls  Eye exam: annually  Alcohol and smoking: no alcohol, never smoked  PAP smear- negative in 2017- repeat in [place] [address]  Patient Active Problem List    Diagnosis Date Noted    Arthritis 02/18/2019    Chronic eczematous otitis externa of left ear 12/07/2017    Chronic midline low back pain without sciatica 12/07/2017    Bilateral plantar fasciitis 11/24/2015    History of renal calculi     Other chronic cystitis with hematuria             REVIEW OF SYSTEMS (except Subjective (HPI))    CONSTITUTIONAL: No weight loss, malaise or fevers  HEENT: Negative for frequent or significant headaches, No changes in hearing or vision, no nose bleeds or other nasal problems  NECK: Negative for lumps, goiter, pain and significant neck swelling  RESPIRATORY: Negative for cough, hemoptysis, wheezing, or shortness of breath  CARDIOVASCULAR: Negative for chest pain, leg swelling,or palpitations  GI: No nausea, vomiting, or diarrhea or Constipation  : No history of dysuria, frequency or incontinence, or hematuria  MUSCULOSKELETAL: Negative for joint pain or swelling , carpal gabriela right hand, small joint stiffness since carpal gabriela release procedure  SKIN: Negative for lesions, rash, and itching  PSYCH: Negative for sleep disturbance, mood disorder and recent psychosocial stressors  NEURO: No history of headaches, syncope, paralysis, seizures or tremors    PAST MEDICAL HISTORY:    Past Medical History:   Diagnosis Date    Arthritis     rt. hand, back.     Cancer ([address]     skin (basal cell)    History of renal calculi 11/14    Hyperglycemia 8/17/2018    Other chronic cystitis with hematuria     saw Dr. Jackie Del Rioball Plantar fasciitis 11/14    right > left saw Dr. Marsha Monte Right carpal tunnel syndrome     per Dr. Elspeth Jeans, [facility]    Wears glasses        PAST SURGICAL HISTORY:    Past Surgical History:   Procedure Laterality Date    CARPAL TUNNEL RELEASE Right 11/17/2020    CARPAL TUNNEL RELEASE performed by Butch Walsh MD at [address] [place] [address]  10/15    posterior scalp    LIPOMA RESECTION Left 10/15    leg above knee    SKIN BIOPSY      SKIN CANCER EXCISION Left 06/2017    hand    TONSILLECTOMY  1963       FAMILY HISTORY:    Family History   Problem Relation Age of Onset    Other Father         etoh   [address] [facility] Cancer Father 62        lung cancer    Alcohol Abuse Father     COPD Father     Brain Cancer Father     Lung Cancer Father     Heart Disease Mother     Coronary Art Dis Brother         SOCIAL HISTORY:    Social History     Socioeconomic History    Marital status:      Spouse name: Not on file    Number of children: Not on file    Years of education: Not on file    Highest education level: Not on file   Occupational History    Not on file   Social Needs    Financial resource strain: Not on file    Food insecurity Worry: Not on file     Inability: Not on file    Transportation needs     Medical: Not on file     Non-medical: Not on file   Tobacco Use    Smoking status: Never Smoker    Smokeless tobacco: Never Used   Substance and Sexual Activity    Alcohol use: No    Drug use: No    Sexual activity: Yes     Partners: Male   Lifestyle    Physical activity     Days per week: Not on file     Minutes per session: Not on file    Stress: Not on file   Relationships    Social connections     Talks on phone: Not on file     Gets together: Not on file     Attends [employer] service: Not on file     Active member of club or organization: Not on file     Attends meetings of clubs or organizations: Not on file     Relationship status: Not on file    Intimate partner violence     Fear of current or ex partner: Not on file     Emotionally abused: Not on file     Physically abused: Not on file     Forced sexual activity: Not on file   Other Topics Concern    Not on file   Social History Narrative    Not on file          CURRENT MEDICATIONS:    Current Outpatient Medications:  Current Outpatient Medications   Medication Sig Dispense Refill    Multiple Vitamins-Minerals (THERAPEUTIC MULTIVITAMIN-MINERALS) tablet Take 1 tablet by mouth daily       No current facility-administered medications for this visit. OBJECTIVE:  Vitals:    03/17/21 1300   BP: 138/80   Pulse: 76   Resp: 16   Temp: 97.3 °F (36.3 °C)       General exam (except above):  Constitutional - well appearing, alert, in no acute distress  Eyes: Anicteric sclera. Pupils are equally round and reactive to light. Extraocular movements are intact. Skin: Skin color, texture, turgor normal  Respiratory - Lungs clear to auscultation. No wheezing, rhonchi, rales  Cardiovascular - RRR. S1S2 present. No leg swelling. Gastrointestinal - Abdomen soft, non-tender.  Bowel sounds normal. No masses, organomegaly  Musculoskeletal - No joint swelling, deformity, or 0 = swallows foods/liquids without difficulty

## 2022-04-27 NOTE — H&P ADULT - ATTENDING COMMENTS
Patient is a 60 yo M from [facility] with PMH of prior upper GI bleed, hypertension, anemia, CAD, hyperlipidemia, diabetes mellitus, GERD and adrenal insuffiencey, ESRD on HD T,Th,S reports to the ED for vomiting blood. Patient noted to  have one episode of dark vomiting and complains of nausea. Patient denies any abdominal pain, dysphagia, hematochezia or melena. No c/o chest pain, shortness of breath, fever, headache, urinary complaints. No recent travel/ change in meds.    # RECURRENT EMESIS [?HEMATEMESIS] W/ HISTORY OF ESOPHAGITIS AND DUODENITIS [1/2021], HX OF GASTROPARESIS W/ EVIDENCE OF THICKENED ESOPHAGUS ON CT SCAN [11/9]   - MONITORING HGB, PLACED ON PPI BID,   - CARAFATE [ONCE PO INTAKE RESUMED], PRN ANTIEMETICS  - NPO, MONITORING FOR EMESIS  - GASTROENTEROLOGY CONSULT - DR. PONCE    # ESRD ON HD TTS - W/ HX OF NONCOMPLIANCE - NEPHROLOGY CONSULT IN PROGRESS  # HYPERKALEMIA - GIVEN LOKELMA, F/U REPEAT POTASSIUM  - EKG ORDERED  - PATIENT COUNSELLED ON MORTALITY RISK OF HYPERKALEMIA, HE VERBALIZED UNDERSTANDING BUT REFUSED HD - RECOMMENDED LOKELMA PER NEPHROLOGY  - NEPHROLOGY CONSULT IN PROGRESS    # SEVERE PROTEIN CALORIE MALNUTRITION, FAILURE TO THRIVE - NUTRITIONAL SUPPLEMENT  # HX OF ANEMIA OF CKD  # HLD  # HTN  # DM   # PARTIALLY BLIND  # S/P LEFT BKA  # HX OF PVD  # LS SPINAL STENOSIS  # GI AND DVT PPX

## 2022-04-27 NOTE — PATIENT PROFILE ADULT - FALL HARM RISK - RISK INTERVENTIONS

## 2022-04-27 NOTE — H&P ADULT - PROBLEM SELECTOR PLAN 1
Pt presented with hematemesis from the NH  had a previous episode of hematemesis in Jan'22  Vitals stable  Physical exam unremarkable  hemoglobin 11.6 stable  Pt is on clear liquid now  NPO after midnight for possible EGD tomorrow  started on PPI 40mg IV BID  started on Carafate 1g QID  GI Dr. Lee consulted

## 2022-04-28 DIAGNOSIS — D63.8 ANEMIA IN OTHER CHRONIC DISEASES CLASSIFIED ELSEWHERE: ICD-10-CM

## 2022-04-28 LAB
ALBUMIN SERPL ELPH-MCNC: 2.6 G/DL — LOW (ref 3.5–5)
ALP SERPL-CCNC: 59 U/L — SIGNIFICANT CHANGE UP (ref 40–120)
ALT FLD-CCNC: 17 U/L DA — SIGNIFICANT CHANGE UP (ref 10–60)
ANION GAP SERPL CALC-SCNC: 10 MMOL/L — SIGNIFICANT CHANGE UP (ref 5–17)
AST SERPL-CCNC: 9 U/L — LOW (ref 10–40)
BILIRUB SERPL-MCNC: 0.4 MG/DL — SIGNIFICANT CHANGE UP (ref 0.2–1.2)
BUN SERPL-MCNC: 104 MG/DL — HIGH (ref 7–18)
CALCIUM SERPL-MCNC: 8.3 MG/DL — LOW (ref 8.4–10.5)
CHLORIDE SERPL-SCNC: 99 MMOL/L — SIGNIFICANT CHANGE UP (ref 96–108)
CO2 SERPL-SCNC: 29 MMOL/L — SIGNIFICANT CHANGE UP (ref 22–31)
CREAT SERPL-MCNC: 11.1 MG/DL — HIGH (ref 0.5–1.3)
EGFR: 5 ML/MIN/1.73M2 — LOW
GLUCOSE BLDC GLUCOMTR-MCNC: 117 MG/DL — HIGH (ref 70–99)
GLUCOSE BLDC GLUCOMTR-MCNC: 125 MG/DL — HIGH (ref 70–99)
GLUCOSE BLDC GLUCOMTR-MCNC: 160 MG/DL — HIGH (ref 70–99)
GLUCOSE BLDC GLUCOMTR-MCNC: 194 MG/DL — HIGH (ref 70–99)
GLUCOSE SERPL-MCNC: 126 MG/DL — HIGH (ref 70–99)
HBV SURFACE AB SER-ACNC: REACTIVE
HBV SURFACE AG SER-ACNC: SIGNIFICANT CHANGE UP
HCT VFR BLD CALC: 25.9 % — LOW (ref 39–50)
HCT VFR BLD CALC: 28.3 % — LOW (ref 39–50)
HGB BLD-MCNC: 8.5 G/DL — LOW (ref 13–17)
HGB BLD-MCNC: 9.3 G/DL — LOW (ref 13–17)
MAGNESIUM SERPL-MCNC: 3.1 MG/DL — HIGH (ref 1.6–2.6)
MCHC RBC-ENTMCNC: 29.4 PG — SIGNIFICANT CHANGE UP (ref 27–34)
MCHC RBC-ENTMCNC: 29.4 PG — SIGNIFICANT CHANGE UP (ref 27–34)
MCHC RBC-ENTMCNC: 32.8 GM/DL — SIGNIFICANT CHANGE UP (ref 32–36)
MCHC RBC-ENTMCNC: 32.9 GM/DL — SIGNIFICANT CHANGE UP (ref 32–36)
MCV RBC AUTO: 89.6 FL — SIGNIFICANT CHANGE UP (ref 80–100)
MCV RBC AUTO: 89.6 FL — SIGNIFICANT CHANGE UP (ref 80–100)
NRBC # BLD: 0 /100 WBCS — SIGNIFICANT CHANGE UP (ref 0–0)
NRBC # BLD: 0 /100 WBCS — SIGNIFICANT CHANGE UP (ref 0–0)
PHOSPHATE SERPL-MCNC: 4.3 MG/DL — SIGNIFICANT CHANGE UP (ref 2.5–4.5)
PLATELET # BLD AUTO: 68 K/UL — LOW (ref 150–400)
PLATELET # BLD AUTO: 72 K/UL — LOW (ref 150–400)
POTASSIUM SERPL-MCNC: 6.2 MMOL/L — CRITICAL HIGH (ref 3.5–5.3)
POTASSIUM SERPL-SCNC: 6.2 MMOL/L — CRITICAL HIGH (ref 3.5–5.3)
PROT SERPL-MCNC: 5.5 G/DL — LOW (ref 6–8.3)
RBC # BLD: 2.89 M/UL — LOW (ref 4.2–5.8)
RBC # BLD: 3.16 M/UL — LOW (ref 4.2–5.8)
RBC # FLD: 17.2 % — HIGH (ref 10.3–14.5)
RBC # FLD: 17.2 % — HIGH (ref 10.3–14.5)
SODIUM SERPL-SCNC: 138 MMOL/L — SIGNIFICANT CHANGE UP (ref 135–145)
WBC # BLD: 2.14 K/UL — LOW (ref 3.8–10.5)
WBC # BLD: 2.43 K/UL — LOW (ref 3.8–10.5)
WBC # FLD AUTO: 2.14 K/UL — LOW (ref 3.8–10.5)
WBC # FLD AUTO: 2.43 K/UL — LOW (ref 3.8–10.5)

## 2022-04-28 PROCEDURE — 99222 1ST HOSP IP/OBS MODERATE 55: CPT | Mod: 1L

## 2022-04-28 RX ADMIN — SEVELAMER CARBONATE 2400 MILLIGRAM(S): 2400 POWDER, FOR SUSPENSION ORAL at 17:35

## 2022-04-28 RX ADMIN — Medication 12.5 MILLIGRAM(S): at 06:06

## 2022-04-28 RX ADMIN — Medication 25 MILLIGRAM(S): at 06:04

## 2022-04-28 RX ADMIN — Medication 25 MILLIGRAM(S): at 17:34

## 2022-04-28 RX ADMIN — SODIUM ZIRCONIUM CYCLOSILICATE 10 GRAM(S): 10 POWDER, FOR SUSPENSION ORAL at 17:36

## 2022-04-28 RX ADMIN — Medication 1 MILLIGRAM(S): at 17:33

## 2022-04-28 RX ADMIN — Medication 1 GRAM(S): at 06:05

## 2022-04-28 RX ADMIN — SIMVASTATIN 40 MILLIGRAM(S): 20 TABLET, FILM COATED ORAL at 21:45

## 2022-04-28 RX ADMIN — Medication 1 GRAM(S): at 17:36

## 2022-04-28 RX ADMIN — SEVELAMER CARBONATE 2400 MILLIGRAM(S): 2400 POWDER, FOR SUSPENSION ORAL at 21:45

## 2022-04-28 RX ADMIN — Medication 1: at 17:37

## 2022-04-28 RX ADMIN — PANTOPRAZOLE SODIUM 40 MILLIGRAM(S): 20 TABLET, DELAYED RELEASE ORAL at 06:04

## 2022-04-28 RX ADMIN — NORTRIPTYLINE HYDROCHLORIDE 10 MILLIGRAM(S): 10 CAPSULE ORAL at 17:34

## 2022-04-28 RX ADMIN — Medication 12.5 MILLIGRAM(S): at 18:39

## 2022-04-28 RX ADMIN — Medication 25 MILLIGRAM(S): at 21:45

## 2022-04-28 RX ADMIN — SEVELAMER CARBONATE 2400 MILLIGRAM(S): 2400 POWDER, FOR SUSPENSION ORAL at 06:05

## 2022-04-28 RX ADMIN — AMLODIPINE BESYLATE 10 MILLIGRAM(S): 2.5 TABLET ORAL at 06:03

## 2022-04-28 RX ADMIN — Medication 25 MICROGRAM(S): at 06:38

## 2022-04-28 RX ADMIN — ZOLPIDEM TARTRATE 5 MILLIGRAM(S): 10 TABLET ORAL at 21:45

## 2022-04-28 NOTE — PROGRESS NOTE ADULT - SUBJECTIVE AND OBJECTIVE BOX
[facility] : Progress Note :: [phone], (office [phone]),   Dr Mosher / Dr Washington / Dr Young / Dr Doll / Dr Varsha UTRCIOS / Dr Tello / Dr Garcia / Dr Larry qiu  _____________________________________________________________________________________________    only had one hour and 45 minutes of HD as insisted on stopping HD     fish (Rash)  liver (Anaphylaxis)  No Known Drug Allergies    Hospital Medications:   MEDICATIONS  (STANDING):  amLODIPine   Tablet 10 milliGRAM(s) Oral daily  folic acid 1 milliGRAM(s) Oral daily  hydrALAZINE 25 milliGRAM(s) Oral three times a day  insulin lispro (ADMELOG) corrective regimen sliding scale   SubCutaneous three times a day before meals  insulin lispro (ADMELOG) corrective regimen sliding scale   SubCutaneous at bedtime  levothyroxine 25 MICROGram(s) Oral daily  metoprolol tartrate 12.5 milliGRAM(s) Oral two times a day  nortriptyline 10 milliGRAM(s) Oral daily  pantoprazole  Injectable 40 milliGRAM(s) IV Push every 12 hours  sevelamer carbonate 2400 milliGRAM(s) Oral every 8 hours  simvastatin 40 milliGRAM(s) Oral at bedtime  sodium zirconium cyclosilicate 10 Gram(s) Oral daily  sucralfate 1 Gram(s) Oral four times a day      VITALS:  T(F): 98.6 (04-28-22 @ 14:06), Max: 99.2 (04-28-22 @ 04:46)  HR: 90 (04-28-22 @ 14:06)  BP: 138/82 (04-28-22 @ 14:06)  RR: 18 (04-28-22 @ 14:06)  SpO2: 98% (04-28-22 @ 14:06)  Wt(kg): --      PHYSICAL EXAM:  Constitutional: NAD  HEENT: anicteric sclera, oropharynx clear,  Neck: No JVD  Respiratory: CTAB, no wheezes, rales or rhonchi  Cardiovascular: S1, S2, RRR  Gastrointestinal: BS+, soft, NT/ND  Extremities:  No peripheral edema  Neurological: A/O x 3, no focal deficits  Vascular Access: AVF with thrill  and bruit     LABS:  04-28    138  |  99  |  104<H>  ----------------------------<  126<H>  6.2<HH>   |  29  |  11.10<H>    Ca    8.3<L>      28 Apr 2022 10:00  Phos  4.3     04-28  Mg     3.1     04-28    TPro  5.5<L>  /  Alb  2.6<L>  /  TBili  0.4  /  DBili      /  AST  9<L>  /  ALT  17  /  AlkPhos  59  04-28    Creatinine Trend: 11.10 <--, 8.61 <--                        9.3    2.14  )-----------( 72       ( 28 Apr 2022 14:07 )             28.3     Urine Studies:      RADIOLOGY & ADDITIONAL STUDIES:

## 2022-04-28 NOTE — PROGRESS NOTE ADULT - PROBLEM SELECTOR PLAN 2
Pt has h/o ESRD  gets HD TTS  HD today, terminated by patient due to headaches   Nephrology Dr. Mosher consulted

## 2022-04-28 NOTE — PHYSICAL THERAPY INITIAL EVALUATION ADULT - ACTIVE RANGE OF MOTION EXAMINATION, REHAB EVAL
Left HIP and Knee-WFL/bilateral upper extremity Active ROM was WFL (within functional limits)/Right LE Active ROM was WFL (within functional limits)

## 2022-04-28 NOTE — CONSULT NOTE ADULT - PROBLEM SELECTOR RECOMMENDATION 9
Continue PPI BID  Continue Carafate Continue PPI BID  Continue Carafate  Clears today  NPO after midnight   EGD tomorrow  COVID 19 PCR neg 4/27.     D/w primary team.

## 2022-04-28 NOTE — PHYSICAL THERAPY INITIAL EVALUATION ADULT - PERTINENT HX OF CURRENT PROBLEM, REHAB EVAL
PMH of prior upper GI bleed, hypertension, anemia, CAD, hyperlipidemia, diabetes mellitus, GERD and adrenal insuffiencey, ESRD on HD T,Th,S reports to the ED for vomiting blood.

## 2022-04-28 NOTE — CONSULT NOTE ADULT - CONSULT REQUESTED BY NAME
Primary Team
Dr De La Torre
For information on Fall & Injury Prevention, visit [email or web address]

## 2022-04-28 NOTE — PROGRESS NOTE ADULT - SUBJECTIVE AND OBJECTIVE BOX
Patient is a 61y old  Male who presents with a chief complaint of Hematemesis (28 Apr 2022 08:05)    PATIENT IS SEEN AND EXAMINED IN MEDICAL FLOOR.  NGT [    ]    JEREMY [   ]      GT [   ]    ALLERGIES:  fish (Rash)  liver (Anaphylaxis)  No Known Drug Allergies      Daily     Daily     VITALS:    Vital Signs Last 24 Hrs  T(C): 37.3 (28 Apr 2022 04:46), Max: 37.3 (28 Apr 2022 04:46)  T(F): 99.2 (28 Apr 2022 04:46), Max: 99.2 (28 Apr 2022 04:46)  HR: 92 (28 Apr 2022 04:46) (89 - 92)  BP: 142/85 (28 Apr 2022 04:46) (142/85 - 145/80)  BP(mean): --  RR: 18 (28 Apr 2022 04:46) (18 - 18)  SpO2: 98% (28 Apr 2022 04:46) (95% - 98%)    LABS:    CBC Full  -  ( 28 Apr 2022 10:00 )  WBC Count : 2.43 K/uL  RBC Count : 2.89 M/uL  Hemoglobin : 8.5 g/dL  Hematocrit : 25.9 %  Platelet Count - Automated : 68 K/uL  Mean Cell Volume : 89.6 fl  Mean Cell Hemoglobin : 29.4 pg  Mean Cell Hemoglobin Concentration : 32.8 gm/dL  Auto Neutrophil # : x  Auto Lymphocyte # : x  Auto Monocyte # : x  Auto Eosinophil # : x  Auto Basophil # : x  Auto Neutrophil % : x  Auto Lymphocyte % : x  Auto Monocyte % : x  Auto Eosinophil % : x  Auto Basophil % : x    PT/INR - ( 27 Apr 2022 11:02 )   PT: 12.2 sec;   INR: 1.02 ratio         PTT - ( 27 Apr 2022 11:02 )  PTT:25.3 sec  04-28    138  |  99  |  104<H>  ----------------------------<  126<H>  6.2<HH>   |  29  |  11.10<H>    Ca    8.3<L>      28 Apr 2022 10:00  Phos  4.3     04-28  Mg     3.1     04-28    TPro  5.5<L>  /  Alb  2.6<L>  /  TBili  0.4  /  DBili  x   /  AST  9<L>  /  ALT  17  /  AlkPhos  59  04-28    CAPILLARY BLOOD GLUCOSE      POCT Blood Glucose.: 125 mg/dL (28 Apr 2022 07:42)  POCT Blood Glucose.: 173 mg/dL (27 Apr 2022 21:29)        LIVER FUNCTIONS - ( 28 Apr 2022 10:00 )  Alb: 2.6 g/dL / Pro: 5.5 g/dL / ALK PHOS: 59 U/L / ALT: 17 U/L DA / AST: 9 U/L / GGT: x           Creatinine Trend: 11.10<--, 8.61<--  I&O's Summary          .Blood Blood-Peripheral  02-22 @ 03:45   No Growth Final  --  --          MEDICATIONS:    MEDICATIONS  (STANDING):  amLODIPine   Tablet 10 milliGRAM(s) Oral daily  folic acid 1 milliGRAM(s) Oral daily  hydrALAZINE 25 milliGRAM(s) Oral three times a day  insulin lispro (ADMELOG) corrective regimen sliding scale   SubCutaneous three times a day before meals  insulin lispro (ADMELOG) corrective regimen sliding scale   SubCutaneous at bedtime  levothyroxine 25 MICROGram(s) Oral daily  metoprolol tartrate 12.5 milliGRAM(s) Oral two times a day  nortriptyline 10 milliGRAM(s) Oral daily  pantoprazole  Injectable 40 milliGRAM(s) IV Push every 12 hours  sevelamer carbonate 2400 milliGRAM(s) Oral every 8 hours  simvastatin 40 milliGRAM(s) Oral at bedtime  sodium zirconium cyclosilicate 10 Gram(s) Oral daily  sucralfate 1 Gram(s) Oral four times a day      MEDICATIONS  (PRN):  zolpidem 5 milliGRAM(s) Oral at bedtime PRN Insomnia      REVIEW OF SYSTEMS:                           ALL ROS DONE [ X   ]    CONSTITUTIONAL:  LETHARGIC [   ], FEVER [   ], UNRESPONSIVE [   ]  CVS:  CP  [   ], SOB, [   ], PALPITATIONS [   ], DIZZYNESS [   ]  RS: COUGH [   ], SPUTUM [   ]  GI: ABDOMINAL PAIN [   ], NAUSEA [   ], VOMITINGS [   ], DIARRHEA [   ], CONSTIPATION [   ]  :  DYSURIA [   ], NOCTURIA [   ], INCREASED FREQUENCY [   ], DRIBLING [   ],  SKELETAL: PAINFUL JOINTS [   ], SWOLLEN JOINTS [   ], NECK ACHE [   ], LOW BACK ACHE [   ],  SKIN : ULCERS [   ], RASH [   ], ITCHING [   ]  CNS: HEAD ACHE [   ], DOUBLE VISION [   ], BLURRED VISION [   ], AMS / CONFUSION [   ], SEIZURES [   ], WEAKNESS [   ],TINGLING / NUMBNESS [   ]    PHYSICAL EXAMINATION:  GENERAL APPEARANCE: NO DISTRESS  HEENT:  NO PALLOR, NO  JVD,  NO   NODES, NECK SUPPLE  CVS: S1 +, S2 +,   RS: AEEB,  OCCASIONAL  RALES +,   NO RONCHI  ABD: SOFT, NT, NO, BS +  EXT: NO PE  SKIN: WARM,   SKELETAL:  ROM ACCEPTABLE  CNS:  AAO X    ,   DEFICITS    RADIOLOGY :      ASSESSMENT :     Hematemesis    No pertinent past medical history    Hypertension    Adrenal insufficiency    CKD (chronic kidney disease)    Anemia    Glaucoma    Coronary artery disease    HLD (hyperlipidemia)    Peripheral vascular disease    Spinal stenosis of lumbosacral region    Hyperparathyroidism    Diabetes mellitus    Diabetic neuropathy    Contracture of hand    Osteoarthritis    Osteoporosis    Vision loss of left eye    ESRD on hemodialysis    Cataract    BPH (benign prostatic hyperplasia)    UTI (urinary tract infection)    Bladder mass    H/O hematuria    Osteoporosis    Vision loss of right eye    Depression    Chronic GERD    Osteomyelitis of vertebra    No significant past surgical history    Below knee amputation status, left    History of right cataract extraction    History of left cataract extraction    S/P arteriovenous (AV) fistula creation    H/O hematuria    H/O transurethral destruction of bladder lesion    History of excision of mass        PLAN:  HPI:  Patient is a 62 yo M from [facility] with PMH of prior upper GI bleed, hypertension, anemia, CAD, hyperlipidemia, diabetes mellitus, GERD and adrenal insuffiencey, ESRD on HD T,Th,S reports to the ED for vomiting blood. Patient noted to  have one episode of dark vomiting and complains of nausea. Patient denies any abdominal pain, dysphagia, hematochezia or melena. No c/o chest pain, shortness of breath, fever, headache, urinary complaints. No recent travel/ change in meds.   (27 Apr 2022 14:15)      # RECURRENT EMESIS [?HEMATEMESIS] W/ HISTORY OF ESOPHAGITIS AND DUODENITIS [1/2021], HX OF GASTROPARESIS W/ EVIDENCE OF THICKENED ESOPHAGUS ON CT SCAN [11/9]   - MONITORING HGB, PLACED ON PPI BID,   - CARAFATE [ONCE PO INTAKE RESUMED], PRN ANTIEMETICS  - NPO, MONITORING FOR EMESIS  - GASTROENTEROLOGY CONSULT - DR. PONCE    # ESRD ON HD TTS - W/ HX OF NONCOMPLIANCE - NEPHROLOGY CONSULT IN PROGRESS  # HYPERKALEMIA - GIVEN LOKELMA, F/U REPEAT POTASSIUM  - EKG ORDERED  - PATIENT COUNSELLED ON MORTALITY RISK OF HYPERKALEMIA, HE VERBALIZED UNDERSTANDING BUT REFUSED HD - RECOMMENDED LOKELMA PER NEPHROLOGY  - NEPHROLOGY CONSULT IN PROGRESS    # SEVERE PROTEIN CALORIE MALNUTRITION, FAILURE TO THRIVE - NUTRITIONAL SUPPLEMENT  # HX OF ANEMIA OF CKD  # HLD  # HTN  # DM   # PARTIALLY BLIND  # S/P LEFT BKA  # HX OF PVD  # LS SPINAL STENOSIS  # GI AND DVT PPX .   Patient is a 61y old  Male who presents with a chief complaint of Hematemesis (28 Apr 2022 08:05)    PATIENT IS SEEN AND EXAMINED IN MEDICAL FLOOR.  NGT [    ]    JEREMY [   ]      GT [   ]    ALLERGIES:  fish (Rash)  liver (Anaphylaxis)  No Known Drug Allergies      Daily     Daily     VITALS:    Vital Signs Last 24 Hrs  T(C): 37.3 (28 Apr 2022 04:46), Max: 37.3 (28 Apr 2022 04:46)  T(F): 99.2 (28 Apr 2022 04:46), Max: 99.2 (28 Apr 2022 04:46)  HR: 92 (28 Apr 2022 04:46) (89 - 92)  BP: 142/85 (28 Apr 2022 04:46) (142/85 - 145/80)  BP(mean): --  RR: 18 (28 Apr 2022 04:46) (18 - 18)  SpO2: 98% (28 Apr 2022 04:46) (95% - 98%)    LABS:    CBC Full  -  ( 28 Apr 2022 10:00 )  WBC Count : 2.43 K/uL  RBC Count : 2.89 M/uL  Hemoglobin : 8.5 g/dL  Hematocrit : 25.9 %  Platelet Count - Automated : 68 K/uL  Mean Cell Volume : 89.6 fl  Mean Cell Hemoglobin : 29.4 pg  Mean Cell Hemoglobin Concentration : 32.8 gm/dL  Auto Neutrophil # : x  Auto Lymphocyte # : x  Auto Monocyte # : x  Auto Eosinophil # : x  Auto Basophil # : x  Auto Neutrophil % : x  Auto Lymphocyte % : x  Auto Monocyte % : x  Auto Eosinophil % : x  Auto Basophil % : x    PT/INR - ( 27 Apr 2022 11:02 )   PT: 12.2 sec;   INR: 1.02 ratio         PTT - ( 27 Apr 2022 11:02 )  PTT:25.3 sec  04-28    138  |  99  |  104<H>  ----------------------------<  126<H>  6.2<HH>   |  29  |  11.10<H>    Ca    8.3<L>      28 Apr 2022 10:00  Phos  4.3     04-28  Mg     3.1     04-28    TPro  5.5<L>  /  Alb  2.6<L>  /  TBili  0.4  /  DBili  x   /  AST  9<L>  /  ALT  17  /  AlkPhos  59  04-28    CAPILLARY BLOOD GLUCOSE      POCT Blood Glucose.: 125 mg/dL (28 Apr 2022 07:42)  POCT Blood Glucose.: 173 mg/dL (27 Apr 2022 21:29)        LIVER FUNCTIONS - ( 28 Apr 2022 10:00 )  Alb: 2.6 g/dL / Pro: 5.5 g/dL / ALK PHOS: 59 U/L / ALT: 17 U/L DA / AST: 9 U/L / GGT: x           Creatinine Trend: 11.10<--, 8.61<--  I&O's Summary          .Blood Blood-Peripheral  02-22 @ 03:45   No Growth Final  --  --          MEDICATIONS:    MEDICATIONS  (STANDING):  amLODIPine   Tablet 10 milliGRAM(s) Oral daily  folic acid 1 milliGRAM(s) Oral daily  hydrALAZINE 25 milliGRAM(s) Oral three times a day  insulin lispro (ADMELOG) corrective regimen sliding scale   SubCutaneous three times a day before meals  insulin lispro (ADMELOG) corrective regimen sliding scale   SubCutaneous at bedtime  levothyroxine 25 MICROGram(s) Oral daily  metoprolol tartrate 12.5 milliGRAM(s) Oral two times a day  nortriptyline 10 milliGRAM(s) Oral daily  pantoprazole  Injectable 40 milliGRAM(s) IV Push every 12 hours  sevelamer carbonate 2400 milliGRAM(s) Oral every 8 hours  simvastatin 40 milliGRAM(s) Oral at bedtime  sodium zirconium cyclosilicate 10 Gram(s) Oral daily  sucralfate 1 Gram(s) Oral four times a day      MEDICATIONS  (PRN):  zolpidem 5 milliGRAM(s) Oral at bedtime PRN Insomnia      REVIEW OF SYSTEMS:                           ALL ROS DONE [ X   ]    CONSTITUTIONAL:  LETHARGIC [   ], FEVER [   ], UNRESPONSIVE [   ]  CVS:  CP  [   ], SOB, [   ], PALPITATIONS [   ], DIZZYNESS [   ]  RS: COUGH [   ], SPUTUM [   ]  GI: ABDOMINAL PAIN [   ], NAUSEA [   ], VOMITINGS [   ], DIARRHEA [   ], CONSTIPATION [   ]  :  DYSURIA [   ], NOCTURIA [   ], INCREASED FREQUENCY [   ], DRIBLING [   ],  SKELETAL: PAINFUL JOINTS [   ], SWOLLEN JOINTS [   ], NECK ACHE [   ], LOW BACK ACHE [   ],  SKIN : ULCERS [   ], RASH [   ], ITCHING [   ]  CNS: HEAD ACHE [   ], DOUBLE VISION [   ], BLURRED VISION [   ], AMS / CONFUSION [   ], SEIZURES [   ], WEAKNESS [   ],TINGLING / NUMBNESS [   ]    PHYSICAL EXAMINATION:  GENERAL APPEARANCE: NO DISTRESS  HEENT:  NO PALLOR, NO  JVD,  NO   NODES, NECK SUPPLE  CVS: S1 +, S2 +,   RS: AEEB,  OCCASIONAL  RALES +,   NO RONCHI  ABD: SOFT, NT, NO, BS +  EXT: NO PE  SKIN: WARM,   SKELETAL:  ROM ACCEPTABLE  CNS:  AAO X    ,   DEFICITS    RADIOLOGY :      ASSESSMENT :     Hematemesis    No pertinent past medical history    Hypertension    Adrenal insufficiency    CKD (chronic kidney disease)    Anemia    Glaucoma    Coronary artery disease    HLD (hyperlipidemia)    Peripheral vascular disease    Spinal stenosis of lumbosacral region    Hyperparathyroidism    Diabetes mellitus    Diabetic neuropathy    Contracture of hand    Osteoarthritis    Osteoporosis    Vision loss of left eye    ESRD on hemodialysis    Cataract    BPH (benign prostatic hyperplasia)    UTI (urinary tract infection)    Bladder mass    H/O hematuria    Osteoporosis    Vision loss of right eye    Depression    Chronic GERD    Osteomyelitis of vertebra    No significant past surgical history    Below knee amputation status, left    History of right cataract extraction    History of left cataract extraction    S/P arteriovenous (AV) fistula creation    H/O hematuria    H/O transurethral destruction of bladder lesion    History of excision of mass        PLAN:  HPI:  Patient is a 62 yo M from [facility] with PMH of prior upper GI bleed, hypertension, anemia, CAD, hyperlipidemia, diabetes mellitus, GERD and adrenal insuffiencey, ESRD on HD T,Th,S reports to the ED for vomiting blood. Patient noted to  have one episode of dark vomiting and complains of nausea. Patient denies any abdominal pain, dysphagia, hematochezia or melena. No c/o chest pain, shortness of breath, fever, headache, urinary complaints. No recent travel/ change in meds.   (27 Apr 2022 14:15)      # RECURRENT EMESIS [?HEMATEMESIS] W/ HISTORY OF ESOPHAGITIS AND DUODENITIS [1/2021], HX OF GASTROPARESIS W/ EVIDENCE OF THICKENED ESOPHAGUS ON CT SCAN [11/9]   - MONITORING HGB, PLACED ON PPI BID,   - CARAFATE [ONCE PO INTAKE RESUMED], PRN ANTIEMETICS  - CLD, MONITORING FOR EMESIS  - PLANNED FOR EGD 4/29  - GASTROENTEROLOGY CONSULT - DR. KRIS    # ESRD ON HD TTS - W/ HX OF NONCOMPLIANCE - NEPHROLOGY CONSULT IN PROGRESS  # HYPERKALEMIA - GIVEN LOKELMA, F/U REPEAT POTASSIUM  - EKG ORDERED  - S/P PARTIAL HD ON [4/28] ; PATIENT COUNSELLED AT LENGTH ABOUT IMPORTANCE OF COMPLIANCE W/ COMPLETE HD SESSION AND RISKS INCLUDING BUT NOT LIMITED TO DEATH. PATIENT ACKNOWLEDGED UNDERSTANDING.  - NEPHROLOGY CONSULT IN PROGRESS    # SEVERE PROTEIN CALORIE MALNUTRITION, FAILURE TO THRIVE - NUTRITIONAL SUPPLEMENT  # HX OF ANEMIA OF CKD  # HLD  # HTN  # DM   # PARTIALLY BLIND  # S/P LEFT BKA  # HX OF PVD  # LS SPINAL STENOSIS  # GI AND DVT PPX .

## 2022-04-28 NOTE — CONSULT NOTE ADULT - SUBJECTIVE AND OBJECTIVE BOX
[facility] GI CONSULTATION    Patient is a 61y old  Male who presents with a chief complaint of Hematemesis (27 Apr 2022 15:36)    HPI:  Patient is a 60 yo M from [facility] with PMH of prior upper GI bleed, hypertension, anemia, CAD, hyperlipidemia, diabetes mellitus, GERD and adrenal insuffiencey, ESRD on HD T,Th,S reports to the ED for vomiting blood. Patient noted to  have one episode of dark vomiting and complains of nausea. Patient denies any abdominal pain, dysphagia, hematochezia or melena. No c/o chest pain, shortness of breath, fever, headache, urinary complaints. No recent travel/ change in meds.    GI HPI    60 yo M from [facility] with PMH of prior upper GI bleed, hypertension, anemia, CAD, hyperlipidemia, diabetes mellitus, GERD and adrenal insuffiencey, ESRD on HD T,Th,S reports to the ED for vomiting blood, noted to  have one episode of dark vomiting and complains of nausea. Patient denies any abdominal pain, dysphagia, hematochezia or melena. (27 Apr 2022 14:15)\    Pt seen and examined this morning. Pt resting in bed, endorses no vomiting, no nausea, no chest and abdominal discomfort, no melena. Pt NPO overnight.     PMH/PSH:  PAST MEDICAL & SURGICAL HISTORY:  Hypertension    Adrenal insufficiency    Anemia    Glaucoma    Coronary artery disease    HLD (hyperlipidemia)    Peripheral vascular disease    Spinal stenosis of lumbosacral region    Hyperparathyroidism    Diabetes mellitus    Diabetic neuropathy    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    Osteoarthritis    Vision loss of left eye    ESRD on hemodialysis    Cataract  both eyes - hx of sx done    BPH (benign prostatic hyperplasia)    UTI (urinary tract infection)  hx of    Bladder mass  hx of    H/O hematuria    Osteoporosis    Vision loss of right eye    Depression    Chronic GERD    Osteomyelitis of vertebra    Below knee amputation status, left  2012- pt is wearing prosthesis    History of right cataract extraction    History of left cataract extraction    S/P arteriovenous (AV) fistula creation  right arm brachiocephalic arteriovenous fistula on 11/08/2018    H/O hematuria  s/p bladder bx and fulguration 2/25/2020    H/O transurethral destruction of bladder lesion  2020    History of excision of mass  back mass on 03/31/2021      FH:  FAMILY HISTORY:  Family history of cirrhosis of liver (Mother)    Family history of renal failure (Sibling)    Family history of hypertension (Sibling)    Family history of diabetes mellitus (Sibling)    History of substance abuse in sibling (Sibling)        MEDS:  MEDICATIONS  (STANDING):  amLODIPine   Tablet 10 milliGRAM(s) Oral daily  folic acid 1 milliGRAM(s) Oral daily  hydrALAZINE 25 milliGRAM(s) Oral three times a day  insulin lispro (ADMELOG) corrective regimen sliding scale   SubCutaneous three times a day before meals  insulin lispro (ADMELOG) corrective regimen sliding scale   SubCutaneous at bedtime  levothyroxine 25 MICROGram(s) Oral daily  metoprolol tartrate 12.5 milliGRAM(s) Oral two times a day  nortriptyline 10 milliGRAM(s) Oral daily  pantoprazole  Injectable 40 milliGRAM(s) IV Push every 12 hours  sevelamer carbonate 2400 milliGRAM(s) Oral every 8 hours  simvastatin 40 milliGRAM(s) Oral at bedtime  sodium zirconium cyclosilicate 10 Gram(s) Oral daily  sucralfate 1 Gram(s) Oral four times a day    MEDICATIONS  (PRN):  zolpidem 5 milliGRAM(s) Oral at bedtime PRN Insomnia    Allergies    fish (Rash)  liver (Anaphylaxis)  No Known Drug Allergies    Intolerances            CONSTITUTIONAL:  No weight loss, fever, chills, weakness or fatigue.  HEENT:  Eyes:  No visual loss, blurred vision, double vision or yellow sclerae. Ears, Nose, Throat:  No hearing loss, sneezing, congestion, runny nose or sore throat.  SKIN:  No rash or itching.  CARDIOVASCULAR:  No chest pain, chest pressure or chest discomfort. No palpitations or edema.  RESPIRATORY:  No shortness of breath, cough or sputum.  GASTROINTESTINAL:  SEE HPI  GENITOURINARY:  No dysuria, hematuria, urinary frequency  NEUROLOGICAL:  No headache, dizziness, syncope, paralysis, ataxia, numbness or tingling in the extremities. No change in bowel or bladder control.  MUSCULOSKELETAL:  No muscle, back pain, joint pain or stiffness.  HEMATOLOGIC:  No anemia, bleeding or bruising.  LYMPHATICS:  No enlarged nodes. No history of splenectomy.  PSYCHIATRIC:  No history of depression or anxiety.  ENDOCRINOLOGIC:  No reports of sweating, cold or heat intolerance. No polyuria or polydipsia.      ______________________________________________________________________  PHYSICAL EXAM:  T(C): 37.3 (04-28-22 @ 04:46), Max: 37.4 (04-27-22 @ 10:43)  HR: 92 (04-28-22 @ 04:46)  BP: 142/85 (04-28-22 @ 04:46)  RR: 18 (04-28-22 @ 04:46)  SpO2: 98% (04-28-22 @ 04:46)  Wt(kg): --      GEN: NAD, normocephalic  CVS: S1S2+  CHEST: clear to auscultation  ABD: soft , nontender, nondistended, bowel sounds present  EXTR: no cyanosis, no clubbing, no edema  NEURO: Awake and alert; oriented .....  SKIN:  warm;  non icteric    ______________________________________________________________________  LABS:                        11.6   4.13  )-----------( 70       ( 27 Apr 2022 11:02 )             37.8     04-27    138  |  101  |  65<H>  ----------------------------<  179<H>  6.4<HH>   |  29  |  8.61<H>    Ca    8.7      27 Apr 2022 11:02  Mg     3.0     04-27    TPro  6.5  /  Alb  3.0<L>  /  TBili  0.5  /  DBili  x   /  AST  32  /  ALT  29  /  AlkPhos  74  04-27    LIVER FUNCTIONS - ( 27 Apr 2022 11:02 )  Alb: 3.0 g/dL / Pro: 6.5 g/dL / ALK PHOS: 74 U/L / ALT: 29 U/L DA / AST: 32 U/L / GGT: x           PT/INR - ( 27 Apr 2022 11:02 )   PT: 12.2 sec;   INR: 1.02 ratio         PTT - ( 27 Apr 2022 11:02 )  PTT:25.3 sec  ____________________________________________    IMAGING:    ______________________________________________________________________  ASSESSMENT:  61y Male    [facility]            [facility]    Patient is a 61y old  Male who presents with a chief complaint of Hematemesis (27 Apr 2022 15:36)    HPI:  Patient is a 62 yo M from [facility] with PMH of prior upper GI bleed, hypertension, anemia, CAD, hyperlipidemia, diabetes mellitus, GERD and adrenal insuffiencey, ESRD on HD T,Th,S reports to the ED for vomiting blood. Patient noted to  have one episode of dark vomiting and complains of nausea. Patient denies any abdominal pain, dysphagia, hematochezia or melena. No c/o chest pain, shortness of breath, fever, headache, urinary complaints. No recent travel/ change in meds.    GI HPI    62 yo M from [facility] with PMH of prior upper GI bleed, hypertension, anemia, CAD, hyperlipidemia, diabetes mellitus, GERD and adrenal insuffiencey, ESRD on HD T,Th,S reports to the ED for vomiting blood, noted to  have one episode of dark vomiting and complains of nausea. Patient denies any abdominal pain, dysphagia, hematochezia or melena. (27 Apr 2022 14:15)\    Pt seen and examined this morning. Pt resting in bed, endorses no vomiting, no nausea, no throat pain, chest and or  abdominal discomfort, no melena. Pt NPO overnight.     PMH/PSH:  PAST MEDICAL & SURGICAL HISTORY:  Hypertension    Adrenal insufficiency    Anemia    Glaucoma    Coronary artery disease    HLD (hyperlipidemia)    Peripheral vascular disease    Spinal stenosis of lumbosacral region    Hyperparathyroidism    Diabetes mellitus    Diabetic neuropathy    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    Osteoarthritis    Vision loss of left eye    ESRD on hemodialysis    Cataract  both eyes - hx of sx done    BPH (benign prostatic hyperplasia)    UTI (urinary tract infection)  hx of    Bladder mass  hx of    H/O hematuria    Osteoporosis    Vision loss of right eye    Depression    Chronic GERD    Osteomyelitis of vertebra    Below knee amputation status, left  2012- pt is wearing prosthesis    History of right cataract extraction    History of left cataract extraction    S/P arteriovenous (AV) fistula creation  right arm brachiocephalic arteriovenous fistula on 11/08/2018    H/O hematuria  s/p bladder bx and fulguration 2/25/2020    H/O transurethral destruction of bladder lesion  2020    History of excision of mass  back mass on 03/31/2021      FH:  FAMILY HISTORY:  Family history of cirrhosis of liver (Mother)    Family history of renal failure (Sibling)    Family history of hypertension (Sibling)    Family history of diabetes mellitus (Sibling)    History of substance abuse in sibling (Sibling)    Social Hx:   Active smoker  No ETOH, no illicit drugs        MEDS:  MEDICATIONS  (STANDING):  amLODIPine   Tablet 10 milliGRAM(s) Oral daily  folic acid 1 milliGRAM(s) Oral daily  hydrALAZINE 25 milliGRAM(s) Oral three times a day  insulin lispro (ADMELOG) corrective regimen sliding scale   SubCutaneous three times a day before meals  insulin lispro (ADMELOG) corrective regimen sliding scale   SubCutaneous at bedtime  levothyroxine 25 MICROGram(s) Oral daily  metoprolol tartrate 12.5 milliGRAM(s) Oral two times a day  nortriptyline 10 milliGRAM(s) Oral daily  pantoprazole  Injectable 40 milliGRAM(s) IV Push every 12 hours  sevelamer carbonate 2400 milliGRAM(s) Oral every 8 hours  simvastatin 40 milliGRAM(s) Oral at bedtime  sodium zirconium cyclosilicate 10 Gram(s) Oral daily  sucralfate 1 Gram(s) Oral four times a day    MEDICATIONS  (PRN):  zolpidem 5 milliGRAM(s) Oral at bedtime PRN Insomnia    Allergies    fish (Rash)  liver (Anaphylaxis)  No Known Drug Allergies    Intolerances      CONSTITUTIONAL:  No weight loss, fever, chills, weakness or fatigue.  HEENT:  Eyes:  No visual loss, blurred vision, double vision or yellow sclerae. Ears, Nose, Throat:  No hearing loss, sneezing, congestion, runny nose or sore throat.  SKIN:  No rash or itching.  CARDIOVASCULAR:  No chest pain, chest pressure or chest discomfort. No palpitations or edema.  RESPIRATORY:  No shortness of breath, cough or sputum.  GASTROINTESTINAL:  SEE HPI  GENITOURINARY:  No dysuria, hematuria, urinary frequency  NEUROLOGICAL:  No headache, dizziness, syncope, paralysis, ataxia, numbness or tingling in the extremities. No change in bowel or bladder control.  MUSCULOSKELETAL:  No muscle, back pain, joint pain or stiffness.  HEMATOLOGIC:  No anemia, bleeding or bruising.  LYMPHATICS:  No enlarged nodes. No history of splenectomy.  PSYCHIATRIC:  No history of depression or anxiety.  ENDOCRINOLOGIC:  No reports of sweating, cold or heat intolerance. No polyuria or polydipsia.      ______________________________________________________________________  PHYSICAL EXAM:  T(C): 37.3 (04-28-22 @ 04:46), Max: 37.4 (04-27-22 @ 10:43)  HR: 92 (04-28-22 @ 04:46)  BP: 142/85 (04-28-22 @ 04:46)  RR: 18 (04-28-22 @ 04:46)  SpO2: 98% (04-28-22 @ 04:46)  Wt(kg): --      GEN: NAD, normocephalic  CVS: S1S2+  CHEST: clear to auscultation  ABD: soft , nontender, nondistended, bowel sounds present  EXTR: no cyanosis, no clubbing, no edema  NEURO: Awake and alert; oriented x3  SKIN:  warm;  non icteric    ______________________________________________________________________  LABS:                        11.6   4.13  )-----------( 70       ( 27 Apr 2022 11:02 )             37.8     04-27    138  |  101  |  65<H>  ----------------------------<  179<H>  6.4<HH>   |  29  |  8.61<H>    Ca    8.7      27 Apr 2022 11:02  Mg     3.0     04-27    TPro  6.5  /  Alb  3.0<L>  /  TBili  0.5  /  DBili  x   /  AST  32  /  ALT  29  /  AlkPhos  74  04-27    LIVER FUNCTIONS - ( 27 Apr 2022 11:02 )  Alb: 3.0 g/dL / Pro: 6.5 g/dL / ALK PHOS: 74 U/L / ALT: 29 U/L DA / AST: 32 U/L / GGT: x           PT/INR - ( 27 Apr 2022 11:02 )   PT: 12.2 sec;   INR: 1.02 ratio         PTT - ( 27 Apr 2022 11:02 )  PTT:25.3 sec  ____________________________________________    IMAGING:    < from: Xray Chest 1 View- PORTABLE-Urgent (Xray Chest 1 View- PORTABLE-Urgent .) (04.27.22 @ 11:01) >  PROCEDURE DATE:  04/27/2022          INTERPRETATION:  AP erect chest on April 27, 2022 at 10:59 AM. Patient   has coffee ground emesis.    Heart magnified by technique.    OnMarch 16 there are significant bilateral infiltrates which have almost   totally cleared on present study.    IMPRESSION: Prior bilateral infiltrates markedly improved.    < end of copied text >  < from: CT Abdomen and Pelvis No Cont (12.26.21 @ 14:07) >      INTERPRETATION:  CLINICAL INFORMATION: Esophageal thickening, gastric   pathology, nausea,vomiting    COMPARISON: CT chest/abdomen/pelvis 11/9/2021    CONTRAST/COMPLICATIONS:  IV Contrast: NONE  Oral Contrast: NONE  Complications: None reported at time of study completion    PROCEDURE:  CT of the Chest, Abdomen and Pelvis was performed.  Sagittal and coronal reformats were performed.    FINDINGS:  CHEST:  LUNGS AND LARGE AIRWAYS: Mild pulmonary edema  PLEURA: Trace effusions.  VESSELS: Normal caliber aorta.  HEART: Moderate cardiomegaly. Trace pericardial effusion. Coronary artery   calcifications are present.  MEDIASTINUM AND PADMINI: No adenopathy.  CHEST WALL AND LOWER NECK: No masses.    ABDOMEN AND PELVIS:  LIVER: Normal.  BILE DUCTS: Nondilated.  GALLBLADDER: Normal.  SPLEEN: Normal.  PANCREAS: Not well seen.  ADRENALS: Normal.  KIDNEYS/URETERS: No hydronephrosis or urinary tract calculi. Atrophic   kidneys.    BLADDER: Underdistended limiting evaluation.  REPRODUCTIVE ORGANS: Enlarged prostate.    BOWEL: No bowel-related abnormality. Specifically, no evidence of acute   diverticulitis. Normal appendix and ileocecal region. No bowel   obstruction or bowel inflammation. Thickening and patulous esophagus   again noted as before.  PERITONEUM: Small pelvic free fluid. No free air.  VESSELS: Normal caliber aorta.  RETROPERITONEUM/LYMPH NODES: No adenopathy.  ABDOMINAL WALL: Normal.  BONES: No acute bony abnormality.    IMPRESSION:  *  Mild pulmonary edema and trace bilateral pleural effusions.  *  Moderate cardiomegaly and trace pericardial effusion.  *  Thickening and patulous esophagus again noted as before.    < end of copied text >    ______________________________________________________________________            [facility]    Patient is a 61y old  Male who presents with a chief complaint of Hematemesis (27 Apr 2022 15:36)    HPI:  Patient is a 62 yo M from [facility] with PMH of prior upper GI bleed, hypertension, anemia, CAD, hyperlipidemia, diabetes mellitus, GERD and adrenal insuffiencey, ESRD on HD T,Th,S reports to the ED for vomiting blood. Patient noted to  have one episode of dark vomiting and complains of nausea. Patient denies any abdominal pain, dysphagia, hematochezia or melena. No c/o chest pain, shortness of breath, fever, headache, urinary complaints. No recent travel/ change in meds.    GI HPI    62 yo M from [facility] with PMH of prior upper GI bleed, hypertension, anemia, CAD, hyperlipidemia, diabetes mellitus, GERD and adrenal insuffiencey, ESRD on HD T,Th,S reports to the ED for vomiting blood, noted to  have one episode of dark vomiting and complains of nausea. Patient denies any abdominal pain, dysphagia, hematochezia or melena. (27 Apr 2022 14:15)\    Pt seen and examined this morning. Pt resting in bed, endorses no vomiting, no nausea, no throat pain, chest and or  abdominal discomfort, no melena. Pt NPO overnight.   Pt endorses he's had EGD/colonoscopy in the past , doesn't recall when /where /and which gastroenterologist he follows .     PMH/PSH:  PAST MEDICAL & SURGICAL HISTORY:  Hypertension    Adrenal insufficiency    Anemia    Glaucoma    Coronary artery disease    HLD (hyperlipidemia)    Peripheral vascular disease    Spinal stenosis of lumbosacral region    Hyperparathyroidism    Diabetes mellitus    Diabetic neuropathy    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    Osteoarthritis    Vision loss of left eye    ESRD on hemodialysis    Cataract  both eyes - hx of sx done    BPH (benign prostatic hyperplasia)    UTI (urinary tract infection)  hx of    Bladder mass  hx of    H/O hematuria    Osteoporosis    Vision loss of right eye    Depression    Chronic GERD    Osteomyelitis of vertebra    Below knee amputation status, left  2012- pt is wearing prosthesis    History of right cataract extraction    History of left cataract extraction    S/P arteriovenous (AV) fistula creation  right arm brachiocephalic arteriovenous fistula on 11/08/2018    H/O hematuria  s/p bladder bx and fulguration 2/25/2020    H/O transurethral destruction of bladder lesion  2020    History of excision of mass  back mass on 03/31/2021      FH:  FAMILY HISTORY:  Family history of cirrhosis of liver (Mother)    Family history of renal failure (Sibling)    Family history of hypertension (Sibling)    Family history of diabetes mellitus (Sibling)    History of substance abuse in sibling (Sibling)    Social Hx:   Active smoker  No ETOH, no illicit drugs        MEDS:  MEDICATIONS  (STANDING):  amLODIPine   Tablet 10 milliGRAM(s) Oral daily  folic acid 1 milliGRAM(s) Oral daily  hydrALAZINE 25 milliGRAM(s) Oral three times a day  insulin lispro (ADMELOG) corrective regimen sliding scale   SubCutaneous three times a day before meals  insulin lispro (ADMELOG) corrective regimen sliding scale   SubCutaneous at bedtime  levothyroxine 25 MICROGram(s) Oral daily  metoprolol tartrate 12.5 milliGRAM(s) Oral two times a day  nortriptyline 10 milliGRAM(s) Oral daily  pantoprazole  Injectable 40 milliGRAM(s) IV Push every 12 hours  sevelamer carbonate 2400 milliGRAM(s) Oral every 8 hours  simvastatin 40 milliGRAM(s) Oral at bedtime  sodium zirconium cyclosilicate 10 Gram(s) Oral daily  sucralfate 1 Gram(s) Oral four times a day    MEDICATIONS  (PRN):  zolpidem 5 milliGRAM(s) Oral at bedtime PRN Insomnia    Allergies    fish (Rash)  liver (Anaphylaxis)  No Known Drug Allergies    Intolerances      CONSTITUTIONAL:  No weight loss, fever, chills, weakness or fatigue.  HEENT:  Eyes:  No visual loss, blurred vision, double vision or yellow sclerae. Ears, Nose, Throat:  No hearing loss, sneezing, congestion, runny nose or sore throat.  SKIN:  No rash or itching.  CARDIOVASCULAR:  No chest pain, chest pressure or chest discomfort. No palpitations or edema.  RESPIRATORY:  No shortness of breath, cough or sputum.  GASTROINTESTINAL:  SEE HPI  GENITOURINARY:  No dysuria, hematuria, urinary frequency  NEUROLOGICAL:  No headache, dizziness, syncope, paralysis, ataxia, numbness or tingling in the extremities. No change in bowel or bladder control.  MUSCULOSKELETAL:  No muscle, back pain, joint pain or stiffness.  HEMATOLOGIC:  No anemia, bleeding or bruising.  LYMPHATICS:  No enlarged nodes. No history of splenectomy.  PSYCHIATRIC:  No history of depression or anxiety.  ENDOCRINOLOGIC:  No reports of sweating, cold or heat intolerance. No polyuria or polydipsia.      ______________________________________________________________________  PHYSICAL EXAM:  T(C): 37.3 (04-28-22 @ 04:46), Max: 37.4 (04-27-22 @ 10:43)  HR: 92 (04-28-22 @ 04:46)  BP: 142/85 (04-28-22 @ 04:46)  RR: 18 (04-28-22 @ 04:46)  SpO2: 98% (04-28-22 @ 04:46)  Wt(kg): --      GEN: NAD, normocephalic  CVS: S1S2+  CHEST: clear to auscultation  ABD: soft , nontender, nondistended, bowel sounds present  EXTR: no cyanosis, no clubbing, no edema  NEURO: Awake and alert; oriented x3  SKIN:  warm;  non icteric    ______________________________________________________________________  LABS:                        11.6   4.13  )-----------( 70       ( 27 Apr 2022 11:02 )             37.8     04-27    138  |  101  |  65<H>  ----------------------------<  179<H>  6.4<HH>   |  29  |  8.61<H>    Ca    8.7      27 Apr 2022 11:02  Mg     3.0     04-27    TPro  6.5  /  Alb  3.0<L>  /  TBili  0.5  /  DBili  x   /  AST  32  /  ALT  29  /  AlkPhos  74  04-27    LIVER FUNCTIONS - ( 27 Apr 2022 11:02 )  Alb: 3.0 g/dL / Pro: 6.5 g/dL / ALK PHOS: 74 U/L / ALT: 29 U/L DA / AST: 32 U/L / GGT: x           PT/INR - ( 27 Apr 2022 11:02 )   PT: 12.2 sec;   INR: 1.02 ratio         PTT - ( 27 Apr 2022 11:02 )  PTT:25.3 sec  ____________________________________________    IMAGING:    < from: Xray Chest 1 View- PORTABLE-Urgent (Xray Chest 1 View- PORTABLE-Urgent .) (04.27.22 @ 11:01) >  PROCEDURE DATE:  04/27/2022          INTERPRETATION:  AP erect chest on April 27, 2022 at 10:59 AM. Patient   has coffee ground emesis.    Heart magnified by technique.    OnMarch 16 there are significant bilateral infiltrates which have almost   totally cleared on present study.    IMPRESSION: Prior bilateral infiltrates markedly improved.    < end of copied text >  < from: CT Abdomen and Pelvis No Cont (12.26.21 @ 14:07) >      INTERPRETATION:  CLINICAL INFORMATION: Esophageal thickening, gastric   pathology, nausea,vomiting    COMPARISON: CT chest/abdomen/pelvis 11/9/2021    CONTRAST/COMPLICATIONS:  IV Contrast: NONE  Oral Contrast: NONE  Complications: None reported at time of study completion    PROCEDURE:  CT of the Chest, Abdomen and Pelvis was performed.  Sagittal and coronal reformats were performed.    FINDINGS:  CHEST:  LUNGS AND LARGE AIRWAYS: Mild pulmonary edema  PLEURA: Trace effusions.  VESSELS: Normal caliber aorta.  HEART: Moderate cardiomegaly. Trace pericardial effusion. Coronary artery   calcifications are present.  MEDIASTINUM AND PADMINI: No adenopathy.  CHEST WALL AND LOWER NECK: No masses.    ABDOMEN AND PELVIS:  LIVER: Normal.  BILE DUCTS: Nondilated.  GALLBLADDER: Normal.  SPLEEN: Normal.  PANCREAS: Not well seen.  ADRENALS: Normal.  KIDNEYS/URETERS: No hydronephrosis or urinary tract calculi. Atrophic   kidneys.    BLADDER: Underdistended limiting evaluation.  REPRODUCTIVE ORGANS: Enlarged prostate.    BOWEL: No bowel-related abnormality. Specifically, no evidence of acute   diverticulitis. Normal appendix and ileocecal region. No bowel   obstruction or bowel inflammation. Thickening and patulous esophagus   again noted as before.  PERITONEUM: Small pelvic free fluid. No free air.  VESSELS: Normal caliber aorta.  RETROPERITONEUM/LYMPH NODES: No adenopathy.  ABDOMINAL WALL: Normal.  BONES: No acute bony abnormality.    IMPRESSION:  *  Mild pulmonary edema and trace bilateral pleural effusions.  *  Moderate cardiomegaly and trace pericardial effusion.  *  Thickening and patulous esophagus again noted as before.    < end of copied text >    ______________________________________________________________________

## 2022-04-28 NOTE — PROGRESS NOTE ADULT - SUBJECTIVE AND OBJECTIVE BOX
PGY-1 Progress Note discussed with attending    PAGER #: [[phone]] TILL 5:00 PM  PLEASE CONTACT ON CALL TEAM:  - On Call Team (Please refer to Dejon) FROM 5:00 PM - 8:30PM  - Nightfloat Team FROM 8:30 -7:30 AM    INTERVAL HPI/ OVERNIGHT EVENTS:  No hematemesis or any signs of bleeding overnight. Patient terminated intra dialysis session due to headache.    REVIEW OF SYSTEMS:  CONSTITUTIONAL: No fever, weight loss, or fatigue  RESPIRATORY: No cough, wheezing, chills or hemoptysis; No shortness of breath  CARDIOVASCULAR: No chest pain, palpitations, dizziness, or leg swelling  GASTROINTESTINAL: No abdominal pain. No nausea, vomiting, or hematemesis; No diarrhea or constipation. No melena or hematochezia.  GENITOURINARY: No dysuria or hematuria, urinary frequency  NEUROLOGICAL: No headaches, memory loss, loss of strength, numbness, or tremors  SKIN: No itching, burning, rashes, or lesions     MEDICATIONS  (STANDING):  amLODIPine   Tablet 10 milliGRAM(s) Oral daily  folic acid 1 milliGRAM(s) Oral daily  hydrALAZINE 25 milliGRAM(s) Oral three times a day  insulin lispro (ADMELOG) corrective regimen sliding scale   SubCutaneous three times a day before meals  insulin lispro (ADMELOG) corrective regimen sliding scale   SubCutaneous at bedtime  levothyroxine 25 MICROGram(s) Oral daily  metoprolol tartrate 12.5 milliGRAM(s) Oral two times a day  nortriptyline 10 milliGRAM(s) Oral daily  pantoprazole  Injectable 40 milliGRAM(s) IV Push every 12 hours  sevelamer carbonate 2400 milliGRAM(s) Oral every 8 hours  simvastatin 40 milliGRAM(s) Oral at bedtime  sodium zirconium cyclosilicate 10 Gram(s) Oral daily  sucralfate 1 Gram(s) Oral four times a day    MEDICATIONS  (PRN):  zolpidem 5 milliGRAM(s) Oral at bedtime PRN Insomnia      Vital Signs Last 24 Hrs  T(C): 37.3 (28 Apr 2022 04:46), Max: 37.3 (28 Apr 2022 04:46)  T(F): 99.2 (28 Apr 2022 04:46), Max: 99.2 (28 Apr 2022 04:46)  HR: 92 (28 Apr 2022 04:46) (89 - 92)  BP: 142/85 (28 Apr 2022 04:46) (142/85 - 145/80)  BP(mean): --  RR: 18 (28 Apr 2022 04:46) (18 - 18)  SpO2: 98% (28 Apr 2022 04:46) (95% - 98%)    PHYSICAL EXAMINATION:  GENERAL: NAD, AAOx3  HEAD: AT/NC  EYES: conjunctiva and sclera clear  NECK: supple, No JVD noted, Normal thyroid  CHEST/LUNG: CTABL; no rales, rhonchi, wheezing, or rubs  HEART: regular rate and rhythm; no murmurs, rubs, or gallops  ABDOMEN: soft, nontender, nondistended; Bowel sounds present  EXTREMITIES:  2+ Peripheral Pulses, No clubbing, cyanosis, or edema  SKIN: warm dry                          8.5    2.43  )-----------( 68       ( 28 Apr 2022 10:00 )             25.9     04-28    138  |  99  |  104<H>  ----------------------------<  126<H>  6.2<HH>   |  29  |  11.10<H>    Ca    8.3<L>      28 Apr 2022 10:00  Phos  4.3     04-28  Mg     3.1     04-28    TPro  5.5<L>  /  Alb  2.6<L>  /  TBili  0.4  /  DBili  x   /  AST  9<L>  /  ALT  17  /  AlkPhos  59  04-28    LIVER FUNCTIONS - ( 28 Apr 2022 10:00 )  Alb: 2.6 g/dL / Pro: 5.5 g/dL / ALK PHOS: 59 U/L / ALT: 17 U/L DA / AST: 9 U/L / GGT: x               PT/INR - ( 27 Apr 2022 11:02 )   PT: 12.2 sec;   INR: 1.02 ratio         PTT - ( 27 Apr 2022 11:02 )  PTT:25.3 sec  COVID-19 PCR: NotDetec (27 Apr 2022 13:58)  COVID-19 PCR: NotDetec (16 Mar 2022 05:15)  COVID-19 PCR: NotDetec (22 Feb 2022 00:58)  COVID-19 PCR: Detected (14 Jan 2022 12:05)  COVID-19 PCR: Detected (26 Dec 2021 12:22)  SARS-CoV-2: Detected (23 Dec 2021 16:47)  COVID-19 PCR: NotDetec (11 Nov 2021 06:09)  COVID-19 PCR: NotDetec (07 Nov 2021 22:54)      CAPILLARY BLOOD GLUCOSE      POCT Blood Glucose.: 117 mg/dL (28 Apr 2022 11:34)  POCT Blood Glucose.: 125 mg/dL (28 Apr 2022 07:42)  POCT Blood Glucose.: 173 mg/dL (27 Apr 2022 21:29)      RADIOLOGY & ADDITIONAL TESTS:

## 2022-04-28 NOTE — CHART NOTE - NSCHARTNOTEFT_GEN_A_CORE
Pt refused post dialysis labs. Refused to get K rechecked.   - Tried to explain to the pt how important it was for us to check the potassium as a high level can lead to arrythmia and possible death. Pt still refused.

## 2022-04-28 NOTE — CONSULT NOTE ADULT - ASSESSMENT
Patient is a 61y Male whom presented to the hospital with vomitting blood.  Has ESRD on HD TTS at [facility]. Poor compliance with HD and renal diet.  He is a poor historian, admitted with vomitting blood  renal consulted fro hyperkalemia in setting of ESRD   not in respiratory distress    # ESRD, admitted with hematemesis. Has hyperkalemia. Recommended HD today, but has refused.  loklema  HD in AM   UF as tolerated    # HTN BP stable    # CKDMBD resume sevelamer check PTH and phos with AM labs 
60 yo M from [facility] with PMH of prior upper GI bleed, hypertension, anemia, CAD, hyperlipidemia, diabetes mellitus, GERD and adrenal insuffiencey, ESRD on HD T,Th,S reports to the ED for vomiting blood, noted to  have one episode of dark vomiting and complains of nausea.

## 2022-04-28 NOTE — CONSULT NOTE ADULT - PROBLEM SELECTOR RECOMMENDATION 2
due to ESRD  normocytic anemia, wih thrombocytopenia due to ESRD  normocytic anemia, with thrombocytopenia  Iron stores adequate, B12 and folate levels  WNL

## 2022-04-29 DIAGNOSIS — E87.5 HYPERKALEMIA: ICD-10-CM

## 2022-04-29 LAB
ALBUMIN SERPL ELPH-MCNC: 3.3 G/DL — LOW (ref 3.5–5)
ALP SERPL-CCNC: 69 U/L — SIGNIFICANT CHANGE UP (ref 40–120)
ALT FLD-CCNC: 20 U/L DA — SIGNIFICANT CHANGE UP (ref 10–60)
ANION GAP SERPL CALC-SCNC: 10 MMOL/L — SIGNIFICANT CHANGE UP (ref 5–17)
AST SERPL-CCNC: 16 U/L — SIGNIFICANT CHANGE UP (ref 10–40)
BILIRUB SERPL-MCNC: 0.5 MG/DL — SIGNIFICANT CHANGE UP (ref 0.2–1.2)
BUN SERPL-MCNC: 72 MG/DL — HIGH (ref 7–18)
CALCIUM SERPL-MCNC: 8.7 MG/DL — SIGNIFICANT CHANGE UP (ref 8.4–10.5)
CHLORIDE SERPL-SCNC: 97 MMOL/L — SIGNIFICANT CHANGE UP (ref 96–108)
CO2 SERPL-SCNC: 27 MMOL/L — SIGNIFICANT CHANGE UP (ref 22–31)
CREAT SERPL-MCNC: 10 MG/DL — HIGH (ref 0.5–1.3)
EGFR: 5 ML/MIN/1.73M2 — LOW
GLUCOSE BLDC GLUCOMTR-MCNC: 119 MG/DL — HIGH (ref 70–99)
GLUCOSE BLDC GLUCOMTR-MCNC: 123 MG/DL — HIGH (ref 70–99)
GLUCOSE BLDC GLUCOMTR-MCNC: 133 MG/DL — HIGH (ref 70–99)
GLUCOSE SERPL-MCNC: 146 MG/DL — HIGH (ref 70–99)
HCT VFR BLD CALC: 28.6 % — LOW (ref 39–50)
HGB BLD-MCNC: 9.3 G/DL — LOW (ref 13–17)
MAGNESIUM SERPL-MCNC: 2.7 MG/DL — HIGH (ref 1.6–2.6)
MCHC RBC-ENTMCNC: 29.9 PG — SIGNIFICANT CHANGE UP (ref 27–34)
MCHC RBC-ENTMCNC: 32.5 GM/DL — SIGNIFICANT CHANGE UP (ref 32–36)
MCV RBC AUTO: 92 FL — SIGNIFICANT CHANGE UP (ref 80–100)
NRBC # BLD: 0 /100 WBCS — SIGNIFICANT CHANGE UP (ref 0–0)
PHOSPHATE SERPL-MCNC: 3.9 MG/DL — SIGNIFICANT CHANGE UP (ref 2.5–4.5)
PLATELET # BLD AUTO: 74 K/UL — LOW (ref 150–400)
POTASSIUM SERPL-MCNC: 5.3 MMOL/L — SIGNIFICANT CHANGE UP (ref 3.5–5.3)
POTASSIUM SERPL-SCNC: 5.3 MMOL/L — SIGNIFICANT CHANGE UP (ref 3.5–5.3)
PROT SERPL-MCNC: 6.7 G/DL — SIGNIFICANT CHANGE UP (ref 6–8.3)
RBC # BLD: 3.11 M/UL — LOW (ref 4.2–5.8)
RBC # FLD: 17 % — HIGH (ref 10.3–14.5)
SODIUM SERPL-SCNC: 134 MMOL/L — LOW (ref 135–145)
WBC # BLD: 2.18 K/UL — LOW (ref 3.8–10.5)
WBC # FLD AUTO: 2.18 K/UL — LOW (ref 3.8–10.5)

## 2022-04-29 PROCEDURE — 99232 SBSQ HOSP IP/OBS MODERATE 35: CPT

## 2022-04-29 RX ORDER — ERYTHROPOIETIN 10000 [IU]/ML
8000 INJECTION, SOLUTION INTRAVENOUS; SUBCUTANEOUS
Refills: 0 | Status: DISCONTINUED | OUTPATIENT
Start: 2022-04-29 | End: 2022-05-03

## 2022-04-29 RX ORDER — ONDANSETRON 8 MG/1
4 TABLET, FILM COATED ORAL EVERY 6 HOURS
Refills: 0 | Status: DISCONTINUED | OUTPATIENT
Start: 2022-04-29 | End: 2022-05-03

## 2022-04-29 RX ADMIN — SODIUM ZIRCONIUM CYCLOSILICATE 10 GRAM(S): 10 POWDER, FOR SUSPENSION ORAL at 13:52

## 2022-04-29 RX ADMIN — ZOLPIDEM TARTRATE 5 MILLIGRAM(S): 10 TABLET ORAL at 21:18

## 2022-04-29 RX ADMIN — Medication 1 GRAM(S): at 13:52

## 2022-04-29 RX ADMIN — SEVELAMER CARBONATE 2400 MILLIGRAM(S): 2400 POWDER, FOR SUSPENSION ORAL at 21:18

## 2022-04-29 RX ADMIN — Medication 1 MILLIGRAM(S): at 13:52

## 2022-04-29 RX ADMIN — NORTRIPTYLINE HYDROCHLORIDE 10 MILLIGRAM(S): 10 CAPSULE ORAL at 13:52

## 2022-04-29 RX ADMIN — SEVELAMER CARBONATE 2400 MILLIGRAM(S): 2400 POWDER, FOR SUSPENSION ORAL at 13:52

## 2022-04-29 RX ADMIN — Medication 25 MILLIGRAM(S): at 13:52

## 2022-04-29 NOTE — PROGRESS NOTE ADULT - PROBLEM SELECTOR PLAN 2
Pt has h/o ESRD  gets HD TTS  HD 9/28/22, terminated by patient due to headaches   Nephrology Dr. Mosher consulted

## 2022-04-29 NOTE — PROGRESS NOTE ADULT - SUBJECTIVE AND OBJECTIVE BOX
PGY-1 Progress Note discussed with attending    PAGER #: [[phone]] TILL 5:00 PM  PLEASE CONTACT ON CALL TEAM:  - On Call Team (Please refer to Dejon) FROM 5:00 PM - 8:30PM  - Nightfloat Team FROM 8:30 -7:30 AM        INTERVAL HPI/OVERNIGHT EVENTS:       REVIEW OF SYSTEMS:  CONSTITUTIONAL: No fever, weight loss, or fatigue  RESPIRATORY: No cough, wheezing, chills or hemoptysis; No shortness of breath  CARDIOVASCULAR: No chest pain, palpitations, dizziness, or leg swelling  GASTROINTESTINAL: No abdominal pain. No nausea, vomiting, or hematemesis; No diarrhea or constipation. No melena or hematochezia.  GENITOURINARY: No dysuria or hematuria, urinary frequency  NEUROLOGICAL: No headaches, memory loss, loss of strength, numbness, or tremors  SKIN: No itching, burning, rashes, or lesions     Vital Signs Last 24 Hrs  T(C): 36.9 (28 Apr 2022 21:16), Max: 37 (28 Apr 2022 14:06)  T(F): 98.4 (28 Apr 2022 21:16), Max: 98.6 (28 Apr 2022 14:06)  HR: 98 (28 Apr 2022 21:16) (90 - 98)  BP: 175/73 (28 Apr 2022 21:16) (138/82 - 175/73)  BP(mean): --  RR: 18 (28 Apr 2022 21:16) (16 - 18)  SpO2: 100% (28 Apr 2022 21:16) (98% - 100%)    PHYSICAL EXAMINATION:  GENERAL: NAD, well built  HEAD:  Atraumatic, Normocephalic  EYES:  conjunctiva and sclera clear  NECK: Supple, No JVD, Normal thyroid  CHEST/LUNG: Clear to auscultation. Clear to percussion bilaterally; No rales, rhonchi, wheezing, or rubs  HEART: Regular rate and rhythm; No murmurs, rubs, or gallops  ABDOMEN: Soft, Nontender, Nondistended; Bowel sounds present  NERVOUS SYSTEM:  Alert & Oriented X3,    EXTREMITIES:  2+ Peripheral Pulses, No clubbing, cyanosis, or edema  SKIN: warm dry                          9.3    2.14  )-----------( 72       ( 28 Apr 2022 14:07 )             28.3     04-28    138  |  99  |  104<H>  ----------------------------<  126<H>  6.2<HH>   |  29  |  11.10<H>    Ca    8.3<L>      28 Apr 2022 10:00  Phos  4.3     04-28  Mg     3.1     04-28    TPro  5.5<L>  /  Alb  2.6<L>  /  TBili  0.4  /  DBili  x   /  AST  9<L>  /  ALT  17  /  AlkPhos  59  04-28    LIVER FUNCTIONS - ( 28 Apr 2022 10:00 )  Alb: 2.6 g/dL / Pro: 5.5 g/dL / ALK PHOS: 59 U/L / ALT: 17 U/L DA / AST: 9 U/L / GGT: x               PT/INR - ( 27 Apr 2022 11:02 )   PT: 12.2 sec;   INR: 1.02 ratio         PTT - ( 27 Apr 2022 11:02 )  PTT:25.3 sec    CAPILLARY BLOOD GLUCOSE      RADIOLOGY & ADDITIONAL TESTS:                   PGY-1 Progress Note discussed with attending    PAGER #: [[phone]] TILL 5:00 PM  PLEASE CONTACT ON CALL TEAM:  - On Call Team (Please refer to Dejon) FROM 5:00 PM - 8:30PM  - Nightfloat Team FROM 8:30 -7:30 AM        INTERVAL HPI/OVERNIGHT EVENTS:   No acute overnight events. Pt seen and examined at bedside. Pt sleepy, did not want to answer questions.  Denied new episodes of hematemesis. No other acute complaint.       REVIEW OF SYSTEMS:  CONSTITUTIONAL: No fever, weight loss, or fatigue  RESPIRATORY: No cough, wheezing, chills or hemoptysis; No shortness of breath  CARDIOVASCULAR: No chest pain, palpitations, dizziness, or leg swelling  GASTROINTESTINAL: No abdominal pain. No hematemesis; No diarrhea or constipation. No melena or hematochezia.  GENITOURINARY: No dysuria or hematuria, urinary frequency  NEUROLOGICAL: No headaches, memory loss, loss of strength, numbness, or tremors  SKIN: No itching, burning, rashes, or lesions     Vital Signs Last 24 Hrs  T(C): 36.9 (28 Apr 2022 21:16), Max: 37 (28 Apr 2022 14:06)  T(F): 98.4 (28 Apr 2022 21:16), Max: 98.6 (28 Apr 2022 14:06)  HR: 98 (28 Apr 2022 21:16) (90 - 98)  BP: 175/73 (28 Apr 2022 21:16) (138/82 - 175/73)  BP(mean): --  RR: 18 (28 Apr 2022 21:16) (16 - 18)  SpO2: 100% (28 Apr 2022 21:16) (98% - 100%)    PHYSICAL EXAMINATION:  GENERAL: NAD, thin  HEAD:  Atraumatic, Normocephalic  EYES:  conjunctiva and sclera clear  NECK: Supple, No JVD  CHEST/LUNG: Clear to auscultation; No rales, rhonchi, wheezing, or rubs  HEART: Regular rate and rhythm; No murmurs, rubs, or gallops  ABDOMEN: Soft, Nontender, Nondistended; Bowel sounds present  NERVOUS SYSTEM:  Alert & Oriented X3,    EXTREMITIES:  2+ Peripheral Pulses, No clubbing, cyanosis, or edema. LLE amputation bellow the knee  SKIN: warm dry                          9.3    2.14  )-----------( 72       ( 28 Apr 2022 14:07 )             28.3     04-28    138  |  99  |  104<H>  ----------------------------<  126<H>  6.2<HH>   |  29  |  11.10<H>    Ca    8.3<L>      28 Apr 2022 10:00  Phos  4.3     04-28  Mg     3.1     04-28    TPro  5.5<L>  /  Alb  2.6<L>  /  TBili  0.4  /  DBili  x   /  AST  9<L>  /  ALT  17  /  AlkPhos  59  04-28    LIVER FUNCTIONS - ( 28 Apr 2022 10:00 )  Alb: 2.6 g/dL / Pro: 5.5 g/dL / ALK PHOS: 59 U/L / ALT: 17 U/L DA / AST: 9 U/L / GGT: x               PT/INR - ( 27 Apr 2022 11:02 )   PT: 12.2 sec;   INR: 1.02 ratio         PTT - ( 27 Apr 2022 11:02 )  PTT:25.3 sec    CAPILLARY BLOOD GLUCOSE      RADIOLOGY & ADDITIONAL TESTS:

## 2022-04-29 NOTE — PROGRESS NOTE ADULT - ATTENDING COMMENTS
HPI:  Patient is a 62 yo M from [facility] with PMH of prior upper GI bleed, hypertension, anemia, CAD, hyperlipidemia, diabetes mellitus, GERD and adrenal insuffiencey, ESRD on HD T,Th,S reports to the ED for vomiting blood. Patient noted to  have one episode of dark vomiting and complains of nausea. Patient denies any abdominal pain, dysphagia, hematochezia or melena. No c/o chest pain, shortness of breath, fever, headache, urinary complaints. No recent travel/ change in meds.   (27 Apr 2022 14:15)      # RECURRENT EMESIS [?HEMATEMESIS] W/ HISTORY OF ESOPHAGITIS AND DUODENITIS [1/2021], HX OF GASTROPARESIS W/ EVIDENCE OF THICKENED ESOPHAGUS ON CT SCAN [11/9]   - MONITORING HGB, PLACED ON PPI BID,   - CARAFATE [ONCE PO INTAKE RESUMED], PRN ANTIEMETICS  - CLD, MONITORING FOR EMESIS  - PLANNED FOR EGD 4/29  - GASTROENTEROLOGY CONSULT - DR. PONCE    # ESRD ON HD TTS - W/ HX OF NONCOMPLIANCE - NEPHROLOGY CONSULT IN PROGRESS  # HYPERKALEMIA - GIVEN LOKELMA, F/U REPEAT POTASSIUM  - EKG ORDERED  - S/P PARTIAL HD ON [4/28] ; PATIENT COUNSELLED AT LENGTH ABOUT IMPORTANCE OF COMPLIANCE W/ COMPLETE HD SESSION AND RISKS INCLUDING BUT NOT LIMITED TO DEATH. PATIENT ACKNOWLEDGED UNDERSTANDING.  - NEPHROLOGY CONSULT IN PROGRESS    # SEVERE PROTEIN CALORIE MALNUTRITION, FAILURE TO THRIVE - NUTRITIONAL SUPPLEMENT  # HX OF ANEMIA OF CKD  # HLD  # HTN  # DM   # PARTIALLY BLIND  # S/P LEFT BKA  # HX OF PVD  # LS SPINAL STENOSIS  # GI AND DVT PPX . HPI:  Patient is a 60 yo M from [facility] with PMH of prior upper GI bleed, hypertension, anemia, CAD, hyperlipidemia, diabetes mellitus, GERD and adrenal insuffiencey, ESRD on HD T,Th,S reports to the ED for vomiting blood. Patient noted to  have one episode of dark vomiting and complains of nausea. Patient denies any abdominal pain, dysphagia, hematochezia or melena. No c/o chest pain, shortness of breath, fever, headache, urinary complaints. No recent travel/ change in meds.   (27 Apr 2022 14:15)    # PATIENT WAS NONCOMPLIANT BLOODWORK, THUS PROCEDURE WAS CANCELLED. PATIENT WAS COUNSELLED AT LENGTH ON 4/28 AND 4/29 REGARDING THE IMPORTANCE OF MONITORING ELECTROLYTES AND BLOODWORK, IMPORTANCE OF COMPLIANCE WITH HD AND WITH PURSUING EVALUATION WITH EGD. PATIENT VERBALIZED UNDERSTANDING THAT NONCOMPLIANCE COULD RESULT IN WORSENING MORBIDITY AND EVEN DEATH. PATIENT EXPRESSED THAT DESPITE THE RISKS, AT THIS TIME HE WISHES TO DEFER EGD.     # RECURRENT EMESIS [?HEMATEMESIS] W/ HISTORY OF ESOPHAGITIS AND DUODENITIS [1/2021], HX OF GASTROPARESIS W/ EVIDENCE OF THICKENED ESOPHAGUS ON CT SCAN [11/9]   - MONITORING HGB, PLACED ON PPI BID,   - CARAFATE [ONCE PO INTAKE RESUMED], PRN ANTIEMETICS  - CLD, MONITORING FOR EMESIS  - PLANNED FOR EGD 4/29  - GASTROENTEROLOGY CONSULT - DR. PONCE    # ESRD ON HD TTS - W/ HX OF NONCOMPLIANCE  # HYPERKALEMIA - RESOLVED  - EKG ORDERED  - S/P PARTIAL HD ON [4/28] ; PATIENT COUNSELLED AT LENGTH ABOUT IMPORTANCE OF COMPLIANCE W/ COMPLETE HD SESSION AND RISKS INCLUDING BUT NOT LIMITED TO DEATH. PATIENT ACKNOWLEDGED UNDERSTANDING.  - NEPHROLOGY CONSULT IN PROGRESS    # PANCYTOPENIA   - F/U ANEMIA PANEL  - F/U HIV AND HEPATITIS    # SEVERE PROTEIN CALORIE MALNUTRITION, FAILURE TO THRIVE - NUTRITIONAL SUPPLEMENT  # HX OF ANEMIA OF CKD  # HLD  # HTN  # DM   # PARTIALLY BLIND  # S/P LEFT BKA  # HX OF PVD  # LS SPINAL STENOSIS  # GI AND DVT PPX .

## 2022-04-29 NOTE — PROGRESS NOTE ADULT - SUBJECTIVE AND OBJECTIVE BOX
[facility] : Progress Note :: [phone], (office [phone]),   Dr Mosher / Dr Washington / Dr Young / Dr Doll / Dr Varsha TURCIOS / Dr Tello / Dr Garcia / Dr Larry qiu  _____________________________________________________________________________________________    refused HD today  refusing endoscopy.    fish (Rash)  liver (Anaphylaxis)  No Known Drug Allergies    Hospital Medications:   MEDICATIONS  (STANDING):  amLODIPine   Tablet 10 milliGRAM(s) Oral daily  epoetin beverley-epbx (RETACRIT) Injectable 8000 Unit(s) IV Push <User Schedule>  folic acid 1 milliGRAM(s) Oral daily  hydrALAZINE 25 milliGRAM(s) Oral three times a day  insulin lispro (ADMELOG) corrective regimen sliding scale   SubCutaneous three times a day before meals  insulin lispro (ADMELOG) corrective regimen sliding scale   SubCutaneous at bedtime  levothyroxine 25 MICROGram(s) Oral daily  metoprolol tartrate 12.5 milliGRAM(s) Oral two times a day  nortriptyline 10 milliGRAM(s) Oral daily  pantoprazole  Injectable 40 milliGRAM(s) IV Push every 12 hours  sevelamer carbonate 2400 milliGRAM(s) Oral every 8 hours  simvastatin 40 milliGRAM(s) Oral at bedtime  sodium zirconium cyclosilicate 10 Gram(s) Oral daily  sucralfate 1 Gram(s) Oral four times a day        VITALS:  T(F): 97.6 (04-29-22 @ 14:01), Max: 98.4 (04-28-22 @ 21:16)  HR: 94 (04-29-22 @ 14:01)  BP: 167/90 (04-29-22 @ 14:01)  RR: 18 (04-29-22 @ 14:01)  SpO2: 98% (04-29-22 @ 14:01)  Wt(kg): --    04-28 @ 07:01  -  04-29 @ 07:00  --------------------------------------------------------  IN: 600 mL / OUT: 1861 mL / NET: -1261 mL        PHYSICAL EXAM:  Constitutional: NAD  HEENT: anicteric sclera, oropharynx clear.  Neck: No JVD  Respiratory: CTAB, no wheezes, rales or rhonchi  Cardiovascular: S1, S2, RRR  Gastrointestinal: BS+, soft, NT/ND  Extremities: No peripheral edema  Neurological: A/O x 3, no focal deficits  : No CVA tenderness. No [place].   Skin: No rashes  Vascular Access: RUEAVF with thrilla dn bruit    LABS:  04-29    134<L>  |  97  |  72<H>  ----------------------------<  146<H>  5.3   |  27  |  10.00<H>    Ca    8.7      29 Apr 2022 15:24  Phos  3.9     04-29  Mg     2.7     04-29    TPro  6.7  /  Alb  3.3<L>  /  TBili  0.5  /  DBili      /  AST  16  /  ALT  20  /  AlkPhos  69  04-29    Creatinine Trend: 10.00 <--, 11.10 <--, 8.61 <--                        9.3    2.18  )-----------( 74       ( 29 Apr 2022 15:24 )             28.6     Urine Studies:      RADIOLOGY & ADDITIONAL STUDIES:

## 2022-04-29 NOTE — CHART NOTE - NSCHARTNOTEFT_GEN_A_CORE
Spoke to the patient and explained the importance for us to obtain labs. However, pt is adamant about not wanting lab draws, nor does he wants to undergo EGD. Reached out to patients brother . Fernando Laureano who agreed to talk to the patient and try to convince him about the need for labs. When I attempted to follow up with Mr. Laureano, I obtained no answer.

## 2022-04-29 NOTE — PROGRESS NOTE ADULT - SUBJECTIVE AND OBJECTIVE BOX
GI PROGRESS NOTE    Patient is a 61y old  Male who presents with a chief complaint of Hematemesis (29 Apr 2022 10:16)      HPI:  Patient is a 62 yo M from [facility] with PMH of prior upper GI bleed, hypertension, anemia, CAD, hyperlipidemia, diabetes mellitus, GERD and adrenal insuffiencey, ESRD on HD T,Th,S reports to the ED for vomiting blood. Patient noted to  have one episode of dark vomiting and complains of nausea. Patient denies any abdominal pain, dysphagia, hematochezia or melena. No c/o chest pain, shortness of breath, fever, headache, urinary complaints. No recent travel/ change in meds.   (27 Apr 2022 14:15)        GI HPI  Patient in bed and resting. He is refusing morning labs.   ______________________________________________________________________  PMH/PSH:  PAST MEDICAL & SURGICAL HISTORY:  Hypertension    Adrenal insufficiency    Anemia    Glaucoma    Coronary artery disease    HLD (hyperlipidemia)    Peripheral vascular disease    Spinal stenosis of lumbosacral region    Hyperparathyroidism    Diabetes mellitus    Diabetic neuropathy    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    Osteoarthritis    Vision loss of left eye    ESRD on hemodialysis    Cataract  both eyes - hx of sx done    BPH (benign prostatic hyperplasia)    UTI (urinary tract infection)  hx of    Bladder mass  hx of    H/O hematuria    Osteoporosis    Vision loss of right eye    Depression    Chronic GERD    Osteomyelitis of vertebra    Below knee amputation status, left  2012- pt is wearing prostesis    History of right cataract extraction    History of left cataract extraction    S/P arteriovenous (AV) fistula creation  right arm brachiocephalic arteriovenous fistula on 11/08/2018    H/O hematuria  s/p bladder bx and fulguration 2/25/2020    H/O transurethral destruction of bladder lesion  2020    History of excision of mass  back mass on 03/31/2021      ______________________________________________________________________  MEDS:  MEDICATIONS  (STANDING):  amLODIPine   Tablet 10 milliGRAM(s) Oral daily  epoetin beverley-epbx (RETACRIT) Injectable 8000 Unit(s) IV Push <User Schedule>  folic acid 1 milliGRAM(s) Oral daily  hydrALAZINE 25 milliGRAM(s) Oral three times a day  insulin lispro (ADMELOG) corrective regimen sliding scale   SubCutaneous three times a day before meals  insulin lispro (ADMELOG) corrective regimen sliding scale   SubCutaneous at bedtime  levothyroxine 25 MICROGram(s) Oral daily  metoprolol tartrate 12.5 milliGRAM(s) Oral two times a day  nortriptyline 10 milliGRAM(s) Oral daily  pantoprazole  Injectable 40 milliGRAM(s) IV Push every 12 hours  sevelamer carbonate 2400 milliGRAM(s) Oral every 8 hours  simvastatin 40 milliGRAM(s) Oral at bedtime  sodium zirconium cyclosilicate 10 Gram(s) Oral daily  sucralfate 1 Gram(s) Oral four times a day    MEDICATIONS  (PRN):  ondansetron    Tablet 4 milliGRAM(s) Oral every 6 hours PRN Nausea and/or Vomiting  zolpidem 5 milliGRAM(s) Oral at bedtime PRN Insomnia    ______________________________________________________________________  ALL:   Allergies    fish (Rash)  liver (Anaphylaxis)  No Known Drug Allergies    Intolerances      ______________________________________________________________________  SH: Social History:    ______________________________________________________________________  FH:  FAMILY HISTORY:  Family history of cirrhosis of liver (Mother)    Family history of renal failure (Sibling)    Family history of hypertension (Sibling)    Family history of diabetes mellitus (Sibling)    History of substance abuse in sibling (Sibling)      ______________________________________________________________________  ROS:    CONSTITUTIONAL:  No weight loss, fever, chills, weakness or fatigue.    HEENT:  Eyes:  No visual loss, blurred vision, double vision or yellow sclerae. Ears, Nose, Throat:  No hearing loss, sneezing, congestion, runny nose or sore throat.    SKIN:  No rash or itching.    CARDIOVASCULAR:  No chest pain, chest pressure or chest discomfort. No palpitations or edema.    RESPIRATORY:  No shortness of breath, cough or sputum.    GASTROINTESTINAL:  SEE HPI    GENITOURINARY:  No dysuria, hematuria, urinary frequency    NEUROLOGICAL:  No headache, dizziness, syncope, paralysis, ataxia, numbness or tingling in the extremities. No change in bowel or bladder control.    MUSCULOSKELETAL:  No muscle, back pain, joint pain or stiffness.      PHYSICAL EXAM:  T(C): 36.4 (04-29-22 @ 14:01), Max: 36.9 (04-28-22 @ 21:16)  HR: 94 (04-29-22 @ 14:01)  BP: 167/90 (04-29-22 @ 14:01)  RR: 18 (04-29-22 @ 14:01)  SpO2: 98% (04-29-22 @ 14:01)  Wt(kg): --    04-28 - 04-29  --------------------------------------------------------  IN:    Other (mL): 600 mL  Total IN: 600 mL    OUT:    Other (mL): 1861 mL  Total OUT: 1861 mL    Total NET: -1261 mL          GEN: NAD   CVS- S1 S2  ABD: soft nontender, non distended, bowel sounds+  LUNGS: clear to auscultation  NEURO: noin focal neuro exam; AAO x 2-3  Extremities: no cyanosis, no calf tenderness, no edema, no clubbing. LEFT BKA      ______________________________________________________________________  LABS:                        9.3    2.14  )-----------( 72       ( 28 Apr 2022 14:07 )             28.3     04-28    138  |  99  |  104<H>  ----------------------------<  126<H>  6.2<HH>   |  29  |  11.10<H>    Ca    8.3<L>      28 Apr 2022 10:00  Phos  4.3     04-28  Mg     3.1     04-28    TPro  5.5<L>  /  Alb  2.6<L>  /  TBili  0.4  /  DBili  x   /  AST  9<L>  /  ALT  17  /  AlkPhos  59  04-28    LIVER FUNCTIONS - ( 28 Apr 2022 10:00 )  Alb: 2.6 g/dL / Pro: 5.5 g/dL / ALK PHOS: 59 U/L / ALT: 17 U/L DA / AST: 9 U/L / GGT: x           ______________________________________________________________________  IMAGING:    ______________________________________________________________________

## 2022-04-30 ENCOUNTER — TRANSCRIPTION ENCOUNTER (OUTPATIENT)
Age: 62
End: 2022-04-30

## 2022-04-30 DIAGNOSIS — D61.818 OTHER PANCYTOPENIA: ICD-10-CM

## 2022-04-30 DIAGNOSIS — D64.9 ANEMIA, UNSPECIFIED: ICD-10-CM

## 2022-04-30 LAB
ALBUMIN SERPL ELPH-MCNC: 2.9 G/DL — LOW (ref 3.5–5)
ALP SERPL-CCNC: 63 U/L — SIGNIFICANT CHANGE UP (ref 40–120)
ALT FLD-CCNC: 14 U/L DA — SIGNIFICANT CHANGE UP (ref 10–60)
ANION GAP SERPL CALC-SCNC: 8 MMOL/L — SIGNIFICANT CHANGE UP (ref 5–17)
ANION GAP SERPL CALC-SCNC: 8 MMOL/L — SIGNIFICANT CHANGE UP (ref 5–17)
AST SERPL-CCNC: 13 U/L — SIGNIFICANT CHANGE UP (ref 10–40)
BILIRUB SERPL-MCNC: 0.4 MG/DL — SIGNIFICANT CHANGE UP (ref 0.2–1.2)
BLD GP AB SCN SERPL QL: SIGNIFICANT CHANGE UP
BUN SERPL-MCNC: 49 MG/DL — HIGH (ref 7–18)
BUN SERPL-MCNC: 78 MG/DL — HIGH (ref 7–18)
CALCIUM SERPL-MCNC: 8.2 MG/DL — LOW (ref 8.4–10.5)
CALCIUM SERPL-MCNC: 8.8 MG/DL — SIGNIFICANT CHANGE UP (ref 8.4–10.5)
CHLORIDE SERPL-SCNC: 98 MMOL/L — SIGNIFICANT CHANGE UP (ref 96–108)
CHLORIDE SERPL-SCNC: 99 MMOL/L — SIGNIFICANT CHANGE UP (ref 96–108)
CO2 SERPL-SCNC: 27 MMOL/L — SIGNIFICANT CHANGE UP (ref 22–31)
CO2 SERPL-SCNC: 30 MMOL/L — SIGNIFICANT CHANGE UP (ref 22–31)
CREAT SERPL-MCNC: 12 MG/DL — HIGH (ref 0.5–1.3)
CREAT SERPL-MCNC: 8.5 MG/DL — HIGH (ref 0.5–1.3)
EGFR: 4 ML/MIN/1.73M2 — LOW
EGFR: 7 ML/MIN/1.73M2 — LOW
GLUCOSE BLDC GLUCOMTR-MCNC: 107 MG/DL — HIGH (ref 70–99)
GLUCOSE BLDC GLUCOMTR-MCNC: 117 MG/DL — HIGH (ref 70–99)
GLUCOSE BLDC GLUCOMTR-MCNC: 129 MG/DL — HIGH (ref 70–99)
GLUCOSE BLDC GLUCOMTR-MCNC: 141 MG/DL — HIGH (ref 70–99)
GLUCOSE SERPL-MCNC: 113 MG/DL — HIGH (ref 70–99)
GLUCOSE SERPL-MCNC: 146 MG/DL — HIGH (ref 70–99)
HAV IGM SER-ACNC: SIGNIFICANT CHANGE UP
HBV CORE IGM SER-ACNC: SIGNIFICANT CHANGE UP
HBV SURFACE AG SER-ACNC: SIGNIFICANT CHANGE UP
HCT VFR BLD CALC: 23.3 % — LOW (ref 39–50)
HCT VFR BLD CALC: 25.8 % — LOW (ref 39–50)
HCV AB S/CO SERPL IA: 0.08 S/CO — SIGNIFICANT CHANGE UP (ref 0–0.99)
HCV AB SERPL-IMP: SIGNIFICANT CHANGE UP
HGB BLD-MCNC: 7.8 G/DL — LOW (ref 13–17)
HGB BLD-MCNC: 8.3 G/DL — LOW (ref 13–17)
HIV 1+2 AB+HIV1 P24 AG SERPL QL IA: SIGNIFICANT CHANGE UP
IRON SATN MFR SERPL: 22 % — SIGNIFICANT CHANGE UP (ref 20–55)
IRON SATN MFR SERPL: 48 UG/DL — LOW (ref 65–170)
MAGNESIUM SERPL-MCNC: 2.8 MG/DL — HIGH (ref 1.6–2.6)
MCHC RBC-ENTMCNC: 29.6 PG — SIGNIFICANT CHANGE UP (ref 27–34)
MCHC RBC-ENTMCNC: 30.2 PG — SIGNIFICANT CHANGE UP (ref 27–34)
MCHC RBC-ENTMCNC: 32.2 GM/DL — SIGNIFICANT CHANGE UP (ref 32–36)
MCHC RBC-ENTMCNC: 33.5 GM/DL — SIGNIFICANT CHANGE UP (ref 32–36)
MCV RBC AUTO: 90.3 FL — SIGNIFICANT CHANGE UP (ref 80–100)
MCV RBC AUTO: 92.1 FL — SIGNIFICANT CHANGE UP (ref 80–100)
NRBC # BLD: 0 /100 WBCS — SIGNIFICANT CHANGE UP (ref 0–0)
NRBC # BLD: 0 /100 WBCS — SIGNIFICANT CHANGE UP (ref 0–0)
PHOSPHATE SERPL-MCNC: 4.4 MG/DL — SIGNIFICANT CHANGE UP (ref 2.5–4.5)
PLATELET # BLD AUTO: 55 K/UL — LOW (ref 150–400)
PLATELET # BLD AUTO: 74 K/UL — LOW (ref 150–400)
POTASSIUM SERPL-MCNC: 5.4 MMOL/L — HIGH (ref 3.5–5.3)
POTASSIUM SERPL-MCNC: 5.4 MMOL/L — HIGH (ref 3.5–5.3)
POTASSIUM SERPL-SCNC: 5.4 MMOL/L — HIGH (ref 3.5–5.3)
POTASSIUM SERPL-SCNC: 5.4 MMOL/L — HIGH (ref 3.5–5.3)
PROT SERPL-MCNC: 6 G/DL — SIGNIFICANT CHANGE UP (ref 6–8.3)
RBC # BLD: 2.58 M/UL — LOW (ref 4.2–5.8)
RBC # BLD: 2.8 M/UL — LOW (ref 4.2–5.8)
RBC # FLD: 16.7 % — HIGH (ref 10.3–14.5)
RBC # FLD: 16.7 % — HIGH (ref 10.3–14.5)
SODIUM SERPL-SCNC: 134 MMOL/L — LOW (ref 135–145)
SODIUM SERPL-SCNC: 136 MMOL/L — SIGNIFICANT CHANGE UP (ref 135–145)
TIBC SERPL-MCNC: 217 UG/DL — LOW (ref 250–450)
UIBC SERPL-MCNC: 169 UG/DL — SIGNIFICANT CHANGE UP (ref 110–370)
WBC # BLD: 1.33 K/UL — LOW (ref 3.8–10.5)
WBC # BLD: 1.67 K/UL — LOW (ref 3.8–10.5)
WBC # FLD AUTO: 1.33 K/UL — LOW (ref 3.8–10.5)
WBC # FLD AUTO: 1.67 K/UL — LOW (ref 3.8–10.5)

## 2022-04-30 RX ORDER — SODIUM ZIRCONIUM CYCLOSILICATE 10 G/10G
10 POWDER, FOR SUSPENSION ORAL DAILY
Refills: 0 | Status: DISCONTINUED | OUTPATIENT
Start: 2022-05-01 | End: 2022-05-02

## 2022-04-30 RX ORDER — SODIUM ZIRCONIUM CYCLOSILICATE 10 G/10G
10 POWDER, FOR SUSPENSION ORAL DAILY
Refills: 0 | Status: DISCONTINUED | OUTPATIENT
Start: 2022-04-30 | End: 2022-04-30

## 2022-04-30 RX ORDER — ACETAMINOPHEN 500 MG
650 TABLET ORAL ONCE
Refills: 0 | Status: DISCONTINUED | OUTPATIENT
Start: 2022-04-30 | End: 2022-05-03

## 2022-04-30 RX ADMIN — SEVELAMER CARBONATE 2400 MILLIGRAM(S): 2400 POWDER, FOR SUSPENSION ORAL at 13:14

## 2022-04-30 RX ADMIN — ZOLPIDEM TARTRATE 5 MILLIGRAM(S): 10 TABLET ORAL at 22:29

## 2022-04-30 RX ADMIN — Medication 25 MILLIGRAM(S): at 13:15

## 2022-04-30 RX ADMIN — NORTRIPTYLINE HYDROCHLORIDE 10 MILLIGRAM(S): 10 CAPSULE ORAL at 13:16

## 2022-04-30 RX ADMIN — Medication 1 GRAM(S): at 13:15

## 2022-04-30 RX ADMIN — SODIUM ZIRCONIUM CYCLOSILICATE 10 GRAM(S): 10 POWDER, FOR SUSPENSION ORAL at 11:00

## 2022-04-30 RX ADMIN — ERYTHROPOIETIN 8000 UNIT(S): 10000 INJECTION, SOLUTION INTRAVENOUS; SUBCUTANEOUS at 10:32

## 2022-04-30 RX ADMIN — SIMVASTATIN 40 MILLIGRAM(S): 20 TABLET, FILM COATED ORAL at 22:26

## 2022-04-30 RX ADMIN — Medication 1 GRAM(S): at 18:05

## 2022-04-30 RX ADMIN — SEVELAMER CARBONATE 2400 MILLIGRAM(S): 2400 POWDER, FOR SUSPENSION ORAL at 22:24

## 2022-04-30 RX ADMIN — Medication 12.5 MILLIGRAM(S): at 18:09

## 2022-04-30 RX ADMIN — Medication 1 MILLIGRAM(S): at 13:15

## 2022-04-30 RX ADMIN — PANTOPRAZOLE SODIUM 40 MILLIGRAM(S): 20 TABLET, DELAYED RELEASE ORAL at 18:05

## 2022-04-30 NOTE — PROGRESS NOTE ADULT - PROBLEM SELECTOR PLAN 1
Patient refusing morning labs. Last potassium Level 6.2. Anesthesia and GI canceled his EGD. As per resident an attempt was made to call family and assist with drawing labs but patient still refused. EGD will be canceled for now  -Continue PPI BID  -Continue Carafate  -advance diet as tolerated   -Call GI PRN
Pt presented with hematemesis from the NH  had a previous episode of hematemesis in Jan'22  Vitals stable  Physical exam unremarkable  started on PPI 40mg IV BID  started on Carafate 1g QID  GI Dr. Lee consulted  Hb 8.5 this AM, around baseline. No active signs of bleeding. F/u CBC 12 PM   Pt is NPO now. According to GI unlikely to go to EGD today but will switch to clear liquid once confirmed by GI.  -will keep NPO after midnight for possible EGD tomorrow AM
Pt presented with hematemesis from the NH  had a previous episode of hematemesis in Jan'22  denies new episodes of hematemesis since being here   pt was scheduled for EGD. However, given he refused labs. EGD was cancelled.   c/w  PPI 40mg IV BID Carafate 1g QID  clear liquid diet for now  advance diet as tolerated
Pt presented with hematemesis from the NH  had a previous episode of hematemesis in Jan'22  no new episodes of hematemesis since being here   pt was scheduled for EGD on 4/29/22. However, given he refused labs. EGD was cancelled.  Pt also refusing EGD  given clinical improvement advanced diet to regular  c/w PPI 40mg IV BID   c/w Carafate 1g QID  c/w zofran PRN   continue to monitor for recurrent episodes of N/N

## 2022-04-30 NOTE — DISCHARGE NOTE PROVIDER - NSDCMRMEDTOKEN_GEN_ALL_CORE_FT
Ambien 5 mg oral tablet: 1 tab(s) orally once a day (at bedtime)  aspirin 81 mg oral delayed release tablet: 1 tab(s) orally once a day  Benadryl 25 mg oral capsule: 1 tab(s) orally once a day (at bedtime)  DuoNeb 0.5 mg-2.5 mg/3 mL inhalation solution: 3 milliliter(s) inhaled 4 times a day  ergocalciferol 1.25 mg (50,000 intl units) oral tablet: 1 tab(s) orally once a day  ferrous sulfate 325 mg (65 mg elemental iron) oral tablet: 1 tab(s) orally every other day  folic acid 1 mg oral tablet: 1 tab(s) orally once a day  hydrALAZINE 25 mg oral tablet: 1 tab(s) orally 3 times a day  insulin lispro 100 units/mL injectable solution: unit(s) injectable 3 times a day  1 Unit(s) if Glucose 151 - 200  2 Unit(s) if Glucose 201 - 250  3 Unit(s) if Glucose 251 - 300  4 Unit(s) if Glucose 301 - 350  5 Unit(s) if Glucose 351 - 400  6 Unit(s) if Glucose Greater Than 400  Levemir 100 units/mL subcutaneous solution: 4 unit(s) subcutaneous 2 times a day at 8am and 5pm  Lokelma 10 g oral powder for reconstitution: 1 packet(s) orally once a day in morning  metoprolol succinate 25 mg oral tablet, extended release: 1 tab(s) orally once a day  Norvasc 10 mg oral tablet: 1 tab(s) orally once a day  oxyCODONE 5 mg oral tablet: 1 tab(s) orally 3 times a day at 8am/12pm/5pm  Pamelor 10 mg oral capsule: 1 cap(s) orally once a day  Pepcid 20 mg oral tablet: 1 tab(s) orally 2 times a day  Reglan 10 mg oral tablet: 1 tab(s) orally 3 times a day  8am/12pm/5pm  Polina-Dior oral tablet: 1 tab(s) orally once a day  sevelamer carbonate 2.4 g oral powder for reconstitution: 1 each orally 3 times a day (with meals)  Synthroid 25 mcg (0.025 mg) oral tablet: 1 tab(s) orally once a day  Zocor 40 mg oral tablet: 1 tab(s) orally once a day (at bedtime)   Ambien 5 mg oral tablet: 1 tab(s) orally once a day (at bedtime)  aspirin 81 mg oral delayed release tablet: 1 tab(s) orally once a day  Benadryl 25 mg oral capsule: 1 tab(s) orally once a day (at bedtime)  DuoNeb 0.5 mg-2.5 mg/3 mL inhalation solution: 3 milliliter(s) inhaled 4 times a day  ergocalciferol 1.25 mg (50,000 intl units) oral tablet: 1 tab(s) orally once a day  ferrous sulfate 325 mg (65 mg elemental iron) oral tablet: 1 tab(s) orally every other day  folic acid 1 mg oral tablet: 1 tab(s) orally once a day  hydrALAZINE 25 mg oral tablet: 1 tab(s) orally 3 times a day  insulin lispro 100 units/mL injectable solution: unit(s) injectable 3 times a day  1 Unit(s) if Glucose 151 - 200  2 Unit(s) if Glucose 201 - 250  3 Unit(s) if Glucose 251 - 300  4 Unit(s) if Glucose 301 - 350  5 Unit(s) if Glucose 351 - 400  6 Unit(s) if Glucose Greater Than 400  Levemir 100 units/mL subcutaneous solution: 4 unit(s) subcutaneous 2 times a day at 8am and 5pm  Lokelma 10 g oral powder for reconstitution: 1 packet(s) orally 3 times a day  metoprolol succinate 25 mg oral tablet, extended release: 1 tab(s) orally once a day  Norvasc 10 mg oral tablet: 1 tab(s) orally once a day  oxyCODONE 5 mg oral tablet: 1 tab(s) orally 3 times a day at 8am/12pm/5pm  Pamelor 10 mg oral capsule: 1 cap(s) orally once a day  Pepcid 20 mg oral tablet: 1 tab(s) orally 2 times a day  Reglan 10 mg oral tablet: 1 tab(s) orally 3 times a day  8am/12pm/5pm  Polina-Dior oral tablet: 1 tab(s) orally once a day  sevelamer carbonate 2.4 g oral powder for reconstitution: 1 each orally 3 times a day (with meals)  Synthroid 25 mcg (0.025 mg) oral tablet: 1 tab(s) orally once a day  Zocor 40 mg oral tablet: 1 tab(s) orally once a day (at bedtime)

## 2022-04-30 NOTE — PROGRESS NOTE ADULT - SUBJECTIVE AND OBJECTIVE BOX
PGY-1 Progress Note discussed with attending    PAGER #: [[phone]] TILL 5:00 PM  PLEASE CONTACT ON CALL TEAM:  - On Call Team (Please refer to Dejon) FROM 5:00 PM - 8:30PM  - Nightfloat Team FROM 8:30 -7:30 AM        INTERVAL HPI/OVERNIGHT EVENTS:       REVIEW OF SYSTEMS:  CONSTITUTIONAL: No fever, weight loss, or fatigue  RESPIRATORY: No cough, wheezing, chills or hemoptysis; No shortness of breath  CARDIOVASCULAR: No chest pain, palpitations, dizziness, or leg swelling  GASTROINTESTINAL: No abdominal pain. No nausea, vomiting, or hematemesis; No diarrhea or constipation. No melena or hematochezia.  GENITOURINARY: No dysuria or hematuria, urinary frequency  NEUROLOGICAL: No headaches, memory loss, loss of strength, numbness, or tremors  SKIN: No itching, burning, rashes, or lesions     Vital Signs Last 24 Hrs  T(C): 37 (30 Apr 2022 10:46), Max: 37.5 (30 Apr 2022 05:04)  T(F): 98.6 (30 Apr 2022 10:46), Max: 99.5 (30 Apr 2022 05:04)  HR: 97 (30 Apr 2022 10:46) (89 - 105)  BP: 139/72 (30 Apr 2022 10:46) (139/65 - 179/84)  BP(mean): --  RR: 18 (30 Apr 2022 10:46) (18 - 18)  SpO2: 99% (30 Apr 2022 10:46) (96% - 99%)    PHYSICAL EXAMINATION:  GENERAL: NAD, well built  HEAD:  Atraumatic, Normocephalic  EYES:  conjunctiva and sclera clear  NECK: Supple, No JVD, Normal thyroid  CHEST/LUNG: Clear to auscultation. Clear to percussion bilaterally; No rales, rhonchi, wheezing, or rubs  HEART: Regular rate and rhythm; No murmurs, rubs, or gallops  ABDOMEN: Soft, Nontender, Nondistended; Bowel sounds present  NERVOUS SYSTEM:  Alert & Oriented X3,    EXTREMITIES:  2+ Peripheral Pulses, No clubbing, cyanosis, or edema  SKIN: warm dry                          7.8    1.67  )-----------( 74       ( 30 Apr 2022 06:35 )             23.3     04-30    136  |  98  |  78<H>  ----------------------------<  113<H>  5.4<H>   |  30  |  12.00<H>    Ca    8.8      30 Apr 2022 06:35  Phos  4.4     04-30  Mg     2.8     04-30    TPro  6.0  /  Alb  2.9<L>  /  TBili  0.4  /  DBili  x   /  AST  13  /  ALT  14  /  AlkPhos  63  04-30    LIVER FUNCTIONS - ( 30 Apr 2022 06:35 )  Alb: 2.9 g/dL / Pro: 6.0 g/dL / ALK PHOS: 63 U/L / ALT: 14 U/L DA / AST: 13 U/L / GGT: x                   CAPILLARY BLOOD GLUCOSE      RADIOLOGY & ADDITIONAL TESTS:                   PGY-1 Progress Note discussed with attending    PAGER #: [[phone]] TILL 5:00 PM  PLEASE CONTACT ON CALL TEAM:  - On Call Team (Please refer to Dejon) FROM 5:00 PM - 8:30PM  - Nightfloat Team FROM 8:30 -7:30 AM        INTERVAL HPI/OVERNIGHT EVENTS:  No acute overnight events. As per nursing staff pt has been tolerated diet. Evaluated pt at bedside after he came back from HD. Endorses feeling ok, denies new episodes of hematemesis. Demanding to have regular food.        REVIEW OF SYSTEMS:  CONSTITUTIONAL: No fever, weight loss, or fatigue  RESPIRATORY: No cough, wheezing, chills or hemoptysis; No shortness of breath  CARDIOVASCULAR: No chest pain, palpitations, dizziness, or leg swelling  GASTROINTESTINAL: No abdominal pain. No nausea, vomiting, or hematemesis; No diarrhea or constipation. No melena or hematochezia.  GENITOURINARY: No dysuria or hematuria, urinary frequency  NEUROLOGICAL: No headaches, memory loss, loss of strength, numbness, or tremors  SKIN: No itching, burning, rashes, or lesions     Vital Signs Last 24 Hrs  T(C): 37 (30 Apr 2022 10:46), Max: 37.5 (30 Apr 2022 05:04)  T(F): 98.6 (30 Apr 2022 10:46), Max: 99.5 (30 Apr 2022 05:04)  HR: 97 (30 Apr 2022 10:46) (89 - 105)  BP: 139/72 (30 Apr 2022 10:46) (139/65 - 179/84)  BP(mean): --  RR: 18 (30 Apr 2022 10:46) (18 - 18)  SpO2: 99% (30 Apr 2022 10:46) (96% - 99%)      PHYSICAL EXAMINATION:  GENERAL: NAD, thin  HEAD:  Atraumatic, Normocephalic  EYES:  conjunctiva and sclera clear  NECK: Supple, No JVD  CHEST/LUNG: Clear to auscultation; No rales, rhonchi, wheezing, or rubs  HEART: Regular rate and rhythm; No murmurs, rubs, or gallops  ABDOMEN: Soft, Nontender, Nondistended; Bowel sounds present  NERVOUS SYSTEM:  Alert & Oriented X3,    EXTREMITIES:  2+ Peripheral Pulses, No clubbing, cyanosis, or edema. 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   SKIN: warm dry                            7.8    1.67  )-----------( 74       ( 30 Apr 2022 06:35 )             23.3     04-30    136  |  98  |  78<H>  ----------------------------<  113<H>  5.4<H>   |  30  |  12.00<H>    Ca    8.8      30 Apr 2022 06:35  Phos  4.4     04-30  Mg     2.8     04-30    TPro  6.0  /  Alb  2.9<L>  /  TBili  0.4  /  DBili  x   /  AST  13  /  ALT  14  /  AlkPhos  63  04-30    LIVER FUNCTIONS - ( 30 Apr 2022 06:35 )  Alb: 2.9 g/dL / Pro: 6.0 g/dL / ALK PHOS: 63 U/L / ALT: 14 U/L DA / AST: 13 U/L / GGT: x                   CAPILLARY BLOOD GLUCOSE      RADIOLOGY & ADDITIONAL TESTS:

## 2022-04-30 NOTE — DISCHARGE NOTE PROVIDER - PROVIDER TOKENS
PROVIDER:[TOKEN:[[id number]],FOLLOWUP:[1 week],ESTABLISHEDPATIENT:[T]],PROVIDER:[TOKEN:[[id number]],FOLLOWUP:[1 week]]

## 2022-04-30 NOTE — PROGRESS NOTE ADULT - PROBLEM SELECTOR PLAN 6
on SCD for DVT ppx
Pt has h/o DM  BG controlled   c/w SSI  monitor BG levels
Pt has h/o HTN  c/w home meds with parameters  monitor BPs

## 2022-04-30 NOTE — PROGRESS NOTE ADULT - SUBJECTIVE AND OBJECTIVE BOX
Patient is a 61y Male with  ESRD  ON HD   CONT TO BE  NON  COMPLIANT  WITH HD  REGIMEN     HENRY  REFUSES TO GO  FOR  HD  AND  CUTS TIME   HAS BEEN  EDUCATED ON  THE RISKS  A  NUMBER OF TIMES     HAS  1  HR  45  MINS  HD  TODAY   DEMANDED TO COME OFF    fish (Rash)  liver (Anaphylaxis)  No Known Drug Allergies    Hospital Medications:   MEDICATIONS  (STANDING):  amLODIPine   Tablet 10 milliGRAM(s) Oral daily  epoetin beverley-epbx (RETACRIT) Injectable 8000 Unit(s) IV Push <User Schedule>  folic acid 1 milliGRAM(s) Oral daily  hydrALAZINE 25 milliGRAM(s) Oral three times a day  insulin lispro (ADMELOG) corrective regimen sliding scale   SubCutaneous three times a day before meals  insulin lispro (ADMELOG) corrective regimen sliding scale   SubCutaneous at bedtime  levothyroxine 25 MICROGram(s) Oral daily  metoprolol tartrate 12.5 milliGRAM(s) Oral two times a day  nortriptyline 10 milliGRAM(s) Oral daily  pantoprazole  Injectable 40 milliGRAM(s) IV Push every 12 hours  sevelamer carbonate 2400 milliGRAM(s) Oral every 8 hours  simvastatin 40 milliGRAM(s) Oral at bedtime  sucralfate 1 Gram(s) Oral four times a day    REVIEW OF SYSTEMS:   DOESNT WANT TO ANSWER  QUESTIONS!!       VITALS:  T(F): 98.6 (04-30-22 @ 10:46), Max: 99.5 (04-30-22 @ 05:04)  HR: 97 (04-30-22 @ 10:46)  BP: 139/72 (04-30-22 @ 10:46)  RR: 18 (04-30-22 @ 10:46)  SpO2: 99% (04-30-22 @ 10:46)  Wt(kg): --    04-30 @ 07:01  -  04-30 @ 14:44  --------------------------------------------------------  IN: 600 mL / OUT: 1775 mL / NET: -1175 mL        PHYSICAL EXAM:  Constitutional: NAD  HEENT: CONJ  PINK  Neck: No JVD  Respiratory: CTAB, NO CRACKLES  Cardiovascular: S1, S2, RRR  Gastrointestinal: BS+, soft, NT/ND  Extremities:  No peripheral edema    S/P  L  BKA  Neurological:  ALERT  DOESNT WANT TO ANSWER  : . No [place].     Vascular Access: R  FOREARM  AVF    LABS:  04-30    136  |  98  |  78<H>  ----------------------------<  113<H>  5.4<H>   |  30  |  12.00<H>    Ca    8.8      30 Apr 2022 06:35  Phos  4.4     04-30  Mg     2.8     04-30    TPro  6.0  /  Alb  2.9<L>  /  TBili  0.4  /  DBili      /  AST  13  /  ALT  14  /  AlkPhos  63  04-30    Creatinine Trend: 12.00 <--, 10.00 <--, 11.10 <--, 8.61 <--                        7.8    1.67  )-----------( 74       ( 30 Apr 2022 06:35 )             23.3     Urine Studies:      RADIOLOGY & ADDITIONAL STUDIES:

## 2022-04-30 NOTE — DISCHARGE NOTE PROVIDER - HOSPITAL COURSE
Patient is a 60 yo M from [facility] with PMH of hypertension, anemia, CAD, hyperlipidemia, diabetes mellitus, GERD and adrenal insuffiencey, ESRD on HD T/Th/S reports to the ED for vomiting blood. Admitted for hematemesis.    Hematemesis- pt was kept NPO and started on PPI 40 mg IV BID and Carafate 1 gr every 4 hrs. He was scheduled for EGD on 4/29/22. However, EGD was cancelled as pt had refused morning labs and his last K had been elevated.  He has not had new episodes of hematemesis during his admission. Tolerating regular diet     Acute on chronic anemia - pt has anemia d/t CKD worsened by hematemesis. H/H slowly downtrended. Has been continued on EPO injection during HD.     ESRD on dialysis- pt with poor compliance to HD. While inhouse has received 2 sessions of HD which he ended earlier. Nephrology Dr. Mosher was on board.     Hyperkalemia- in the s/o ESRD. Managed with Lokelma and HD as per schedule    hx of HTN- pt was continued on home meds.     hx of HLD- pt was continued on home meds    h/o DM-  managed with SSI. BG was well controlled.     Patient is stable for discharge as per attending and is advised to follow up with PCP and GI as outpatient.  Continue HD as per T/Thu/S schedule. Please refer to patient's complete medical chart with documents for a full hospital course, for this is only a brief summary.   Patient is a 62 yo M from [facility] with PMH of hypertension, anemia, CAD, hyperlipidemia, diabetes mellitus, GERD and adrenal insuffiencey, ESRD on HD T/Th/S reports to the ED for vomiting blood. Admitted for hematemesis.    Hematemesis- pt was kept NPO and started on PPI 40 mg IV BID and Carafate 1 gr every 4 hrs. He was scheduled for EGD on 4/29/22. However, EGD was cancelled as pt had refused morning labs and his last K had been elevated.  He has not had new episodes of hematemesis during his admission. Tolerating regular diet     Acute on chronic anemia - pt has anemia d/t CKD worsened by hematemesis. H/H slowly downtrended. Has been continued on EPO injection during HD.     ESRD on dialysis- pt with poor compliance to HD. While inhouse has received 2 sessions of HD which he ended earlier. Nephrology Dr. Mosher was on board.     Hyperkalemia- in the s/o ESRD. Patient found to have K of 6.4 on the day of discharge, but refused treatment.  Will  continue management  with Lokelma and HD as per schedule    hx of HTN- pt was continued on home meds.     hx of HLD- pt was continued on home meds    h/o DM-  managed with SSI. BG was well controlled.     Patient is stable for discharge as per attending and is advised to follow up with PCP and GI as outpatient.  Continue HD as per T/Thu/S schedule. Please refer to patient's complete medical chart with documents for a full hospital course, for this is only a brief summary.

## 2022-04-30 NOTE — PROGRESS NOTE ADULT - PROBLEM SELECTOR PLAN 2
Pt has h/o ESRD  gets HD TTS  HD 4/28/22, terminated by patient due to headaches   Last HD today   Nephrology Dr. Mosher following Pt has h/o ESRD  gets HD TTS  HD 4/28/22, terminated by patient due to headaches   Last HD today; as per nephro note cut HD early today as well.   Nephrology Dr. Mosher following

## 2022-04-30 NOTE — DISCHARGE NOTE PROVIDER - NSDCCPCAREPLAN_GEN_ALL_CORE_FT
PRINCIPAL DISCHARGE DIAGNOSIS  Diagnosis: Hematemesis with nausea  Assessment and Plan of Treatment: You presented to the emergency room due to episose of vomiting blood. You were scheduled to have upper endoscopy to evaluate and locate the source of the bleeding. However, because your refused your morning labs and your previuos potassium had been high, the procedure was cancelled. You were treated with pantoprazole 40 mg IV twice daily and carafate 1 gr oral every 6 hrs.  Upon discharge please continue to take pantoprazole 40 mg **** and carfate 1 gr every 6 hrs. Follow up with GI Dr. Molina in 1 wk from discharge to schedule upper endoscopy studies. AVOID  taking Aspirin or NSAIDS such as Ibuprofen, naproxen as those increase the risk of bleeding from your GI tract.      SECONDARY DISCHARGE DIAGNOSES  Diagnosis: ESRD on dialysis  Assessment and Plan of Treatment: During your stay we continued your dialysis sessions as per schedule. Upon discharge please continue your hemodialysis sessions and comply to your sessions. Continue regular follow up with your kidney specialist    Diagnosis: Anemia of chronic disease  Assessment and Plan of Treatment: You have history of anemia due to your chronic kidney disease. However, it was worsened due to previous episode of bloody vomit. During your dialysis sesions you were given Epogen injection. Please follow up with your PCP in 1 wk from discharge to monitor your hemoglobin levels    Diagnosis: Diabetes mellitus  Assessment and Plan of Treatment: Your diabtes was managed with injectable insulin. Upon dischare continue to use lispro and Levemir as insturcted.  Please check your blood sugar levels twice daily - Morning/Afternoon, and Lunch/Bedtime the following day. Keep a record of your blood sugar readings  You must maintain a healthy diet that consists of low sugar and low fat. Your diet must consist of mostly vegetables. Please limit your intake of high sugar and high carbohydrate foods such as pasta, rice, and bread. Exercise frequently if possible. Follow up with primary care physician within one week of your discharge to further manage your diabetes and monitor your Hemoglobin A1c levels after 3 months to evaluate your diabetes control.    Diagnosis: Hyperkalemia  Assessment and Plan of Treatment: You have high potassium levels because of end stage renal disease. Continue to use Lokelma as instructed and follow up on a regular basis with your PCP or kidney doctor to monitor your potassium levels.    Diagnosis: Hypertension  Assessment and Plan of Treatment: You have high blood pressure. Your blood pressure was managed with amlodipine 10 mg oral daily and hydralazine 25 mg oral three times a day.  Please continue to take these medications as prescribed. Please measure your blood pressure at least once daily. Maintain a healthy diet which includes incorporating more vegetables and decreasing the amount of salt (low sodium) and sugar in your diet such as rice, bread, and pasta low sugar, low fat, low sodium diet.Please follow up with your primary care provider within one week of your discharge.    Diagnosis: HLD (hyperlipidemia)  Assessment and Plan of Treatment: You have history of elevated cholesterol levels. We continued your home medication of  simvastatin 40 mg oral daily. Upon discharge please continue to take your medication as directed, comply to a diet low in fats  and follow up with your PCP on a regular basis to monitor your cholesterol levels and adjust your medication as needed.     PRINCIPAL DISCHARGE DIAGNOSIS  Diagnosis: Hematemesis with nausea  Assessment and Plan of Treatment: You presented to the emergency room due to episose of vomiting blood. You were scheduled to have upper endoscopy to evaluate and locate the source of the bleeding. However, because your refused your morning labs and your previuos potassium had been high, the procedure was cancelled. You were treated with pantoprazole 40 mg IV twice daily and carafate 1 gr oral every 6 hrs.  You are no longer vomiting with blood. But you are still vomiting because you have irritation in your tract.  Follow up with GI Dr. Molina in 1 wk from discharge to schedule upper endoscopy studies. AVOID  taking Aspirin or NSAIDS such as Ibuprofen, naproxen as those increase the risk of bleeding from your GI tract.      SECONDARY DISCHARGE DIAGNOSES  Diagnosis: ESRD on dialysis  Assessment and Plan of Treatment: During your stay we continued your dialysis sessions as per schedule. Upon discharge please continue your hemodialysis sessions and comply to your sessions. Continue regular follow up with your kidney specialist  Your potassium levels have been elevated, and we have tried to get your levels down but you have refused. Please get dialysis as scheduled.    Diagnosis: Anemia of chronic disease  Assessment and Plan of Treatment: You have history of anemia due to your chronic kidney disease. However, it was worsened due to previous episode of bloody vomit. During your dialysis sesions you were given Epogen injection. Please follow up with your PCP in 1 wk from discharge to monitor your hemoglobin levels    Diagnosis: Hyperkalemia  Assessment and Plan of Treatment: You have high potassium levels because of end stage renal disease.  Your potassium levels have been elevated, and we have tried to get your levels down but you have refused. Please get dialysis as scheduled.  Continue to use Lokelma as instructed and follow up on a regular basis with your PCP or kidney doctor to monitor your potassium levels.    Diagnosis: Diabetes mellitus  Assessment and Plan of Treatment: Your diabtes was managed with injectable insulin. Upon dischare continue to use lispro and Levemir as insturcted.  Please check your blood sugar levels twice daily - Morning/Afternoon, and Lunch/Bedtime the following day. Keep a record of your blood sugar readings  You must maintain a healthy diet that consists of low sugar and low fat. Your diet must consist of mostly vegetables. Please limit your intake of high sugar and high carbohydrate foods such as pasta, rice, and bread. Exercise frequently if possible. Follow up with primary care physician within one week of your discharge to further manage your diabetes and monitor your Hemoglobin A1c levels after 3 months to evaluate your diabetes control.    Diagnosis: Hypertension  Assessment and Plan of Treatment: You have high blood pressure. Your blood pressure was managed with amlodipine 10 mg oral daily and hydralazine 25 mg oral three times a day.  Please continue to take these medications as prescribed. Please measure your blood pressure at least once daily. Maintain a healthy diet which includes incorporating more vegetables and decreasing the amount of salt (low sodium) and sugar in your diet such as rice, bread, and pasta low sugar, low fat, low sodium diet.Please follow up with your primary care provider within one week of your discharge.    Diagnosis: HLD (hyperlipidemia)  Assessment and Plan of Treatment: You have history of elevated cholesterol levels. We continued your home medication of  simvastatin 40 mg oral daily. Upon discharge please continue to take your medication as directed, comply to a diet low in fats  and follow up with your PCP on a regular basis to monitor your cholesterol levels and adjust your medication as needed.     PRINCIPAL DISCHARGE DIAGNOSIS  Diagnosis: Hematemesis with nausea  Assessment and Plan of Treatment: You presented to the emergency room due to episose of vomiting blood. You were scheduled to have upper endoscopy to evaluate and locate the source of the bleeding. However, because your refused your morning labs and your previuos potassium had been high, the procedure was cancelled. You were treated with pantoprazole 40 mg IV twice daily and carafate 1 gr oral every 6 hrs.  You are no longer vomiting with blood. But you are still vomiting because you have irritation in your tract. Please continue taking pepcid and reglan to reduce the upset. Follow up with GI Dr. Molina in 1 wk from discharge to schedule upper endoscopy studies. AVOID  taking Aspirin or NSAIDS such as Ibuprofen, naproxen as those increase the risk of bleeding from your GI tract.      SECONDARY DISCHARGE DIAGNOSES  Diagnosis: ESRD on dialysis  Assessment and Plan of Treatment: During your stay we continued your dialysis sessions as per schedule. Upon discharge please continue your hemodialysis sessions and comply to your sessions. Continue regular follow up with your kidney specialist  Your potassium levels have been elevated, and we have tried to get your levels down but you have refused. Please get dialysis as scheduled.    Diagnosis: Anemia of chronic disease  Assessment and Plan of Treatment: You have history of anemia due to your chronic kidney disease. However, it was worsened due to previous episode of bloody vomit. During your dialysis sesions you were given Epogen injection. Please follow up with your PCP in 1 wk from discharge to monitor your hemoglobin levels    Diagnosis: Hyperkalemia  Assessment and Plan of Treatment: You have high potassium levels because of end stage renal disease.  Your potassium levels have been elevated, and we have tried to get your levels down but you have refused. Please get dialysis as scheduled.  Continue to use Lokelma as instructed and follow up on a regular basis with your PCP or kidney doctor to monitor your potassium levels.    Diagnosis: Diabetes mellitus  Assessment and Plan of Treatment: Your diabtes was managed with injectable insulin. Upon dischare continue to use lispro and Levemir as insturcted.  Please check your blood sugar levels twice daily - Morning/Afternoon, and Lunch/Bedtime the following day. Keep a record of your blood sugar readings  You must maintain a healthy diet that consists of low sugar and low fat. Your diet must consist of mostly vegetables. Please limit your intake of high sugar and high carbohydrate foods such as pasta, rice, and bread. Exercise frequently if possible. Follow up with primary care physician within one week of your discharge to further manage your diabetes and monitor your Hemoglobin A1c levels after 3 months to evaluate your diabetes control.    Diagnosis: Hypertension  Assessment and Plan of Treatment: You have high blood pressure. Your blood pressure was managed with amlodipine 10 mg oral daily and hydralazine 25 mg oral three times a day.  Please continue to take these medications as prescribed. Please measure your blood pressure at least once daily. Maintain a healthy diet which includes incorporating more vegetables and decreasing the amount of salt (low sodium) and sugar in your diet such as rice, bread, and pasta low sugar, low fat, low sodium diet.Please follow up with your primary care provider within one week of your discharge.    Diagnosis: HLD (hyperlipidemia)  Assessment and Plan of Treatment: You have history of elevated cholesterol levels. We continued your home medication of  simvastatin 40 mg oral daily. Upon discharge please continue to take your medication as directed, comply to a diet low in fats  and follow up with your PCP on a regular basis to monitor your cholesterol levels and adjust your medication as needed.

## 2022-04-30 NOTE — PROGRESS NOTE ADULT - PROBLEM SELECTOR PLAN 3
pt got lokelma  refused labs today  ordered EKG  HD today
Pt with anemia ESRD worsened by hematemesis  H/H dropped today  EPO injection given today during HD  ordered type and screen   repeat CBC tomorrow AM  If hgb <7 or pt symptomatic will need transfusion.
ProBNP elevated at 27k  CXR improved compared to the previous one on March 16th

## 2022-04-30 NOTE — PROGRESS NOTE ADULT - ATTENDING COMMENTS
# PATIENT WAS NONCOMPLIANT BLOODWORK, THUS PROCEDURE WAS CANCELLED. PATIENT WAS COUNSELLED AT LENGTH ON 4/28 AND 4/29 REGARDING THE IMPORTANCE OF MONITORING ELECTROLYTES AND BLOODWORK, IMPORTANCE OF COMPLIANCE WITH HD AND WITH PURSUING EVALUATION WITH EGD. PATIENT VERBALIZED UNDERSTANDING THAT NONCOMPLIANCE COULD RESULT IN WORSENING MORBIDITY AND EVEN DEATH. PATIENT EXPRESSED THAT DESPITE THE RISKS, AT THIS TIME HE WISHES TO DEFER EGD.     # RECURRENT EMESIS [?HEMATEMESIS] W/ HISTORY OF ESOPHAGITIS AND DUODENITIS [1/2021], HX OF GASTROPARESIS W/ EVIDENCE OF THICKENED ESOPHAGUS ON CT SCAN [11/9]   - MONITORING HGB, PLACED ON PPI BID,   - CARAFATE [ONCE PO INTAKE RESUMED], PRN ANTIEMETICS  - CLD, MONITORING FOR EMESIS  - PLANNED FOR EGD 4/29  - GASTROENTEROLOGY CONSULT - DR. PONCE    - [4/30] - OVERNIGHT EPISODE OF EMESIS NBNB, MONITORING HGB AND SYMPTOMS CLOSELY    # ESRD ON HD TTS - W/ HX OF NONCOMPLIANCE  # HYPERKALEMIA - RESOLVED  - EKG ORDERED  - S/P PARTIAL HD ON [4/28] ; PATIENT COUNSELLED AT LENGTH ABOUT IMPORTANCE OF COMPLIANCE W/ COMPLETE HD SESSION AND RISKS INCLUDING BUT NOT LIMITED TO DEATH. PATIENT ACKNOWLEDGED UNDERSTANDING.  - NEPHROLOGY CONSULT IN PROGRESS    # PANCYTOPENIA   - F/U ANEMIA PANEL  - F/U HIV AND HEPATITIS    - TRENDING HGB  - TYPE AND SCREEN    # SEVERE PROTEIN CALORIE MALNUTRITION, FAILURE TO THRIVE - NUTRITIONAL SUPPLEMENT  # HX OF ANEMIA OF CKD  # HLD  # HTN  # DM   # PARTIALLY BLIND  # S/P LEFT BKA  # HX OF PVD  # LS SPINAL STENOSIS  # GI AND DVT PPX

## 2022-04-30 NOTE — DISCHARGE NOTE PROVIDER - CARE PROVIDER_API CALL
Dario De La Torre)  Medicine  [address], [address] [place] [address]  Phone: [phone]  Fax: [phone]  Established Patient  Follow Up Time: 1 week    Maldonado Molina)  Gastroenterology  [address] [facility], [address] [place] [address], [place] [address]  Phone: [phone]  Fax: [phone]  Follow Up Time: 1 week

## 2022-04-30 NOTE — PROGRESS NOTE ADULT - PROBLEM SELECTOR PROBLEM 1
Hematemesis
[Recent Change In Weight] : ~T recent weight change
[Chest Pain] : chest pain
[Palpitations] : palpitations
[Nausea] : nausea
[Negative] : Heme/Lymph
[Abdominal Pain] : no abdominal pain
[de-identified] : stressed 
[FreeTextEntry2] : weight loss

## 2022-04-30 NOTE — PROGRESS NOTE ADULT - PROBLEM SELECTOR PLAN 5
Pt has h/o DM  started on insulin HSS  monitor BG levels
Pt has h/o HTN  c/w home meds with parameters  monitor BPs
s/p Lokelma and HD today   repeat CMP tomorrow

## 2022-04-30 NOTE — PROGRESS NOTE ADULT - PROBLEM SELECTOR PLAN 4
ProBNP elevated at 27k  CXR improved compared to the previous one on March 16th
#Leukopenia  - WBC downtrended  - ordered CBC with differential tomorrow    #Thrombocytopenia  - Plt stable  - no evidence of active bleed
Pt has h/o HTN  started on home meds with parameters  monitor BPs

## 2022-04-30 NOTE — DISCHARGE NOTE PROVIDER - NSDCFUSCHEDAPPT_GEN_ALL_CORE_FT
Mandy Mitchell  [facility]  Gastro OP [id number]  Scheduled Appointment: 05/26/2022    [facility]  Surg V[address]  Scheduled Appointment: 07/25/2022    [facility]  Surg [address]asc [address]  Scheduled Appointment: 07/25/2022    Ambrocio Mason  [facility]  Surg [address]  Scheduled Appointment: 07/25/2022

## 2022-05-01 LAB
ALBUMIN SERPL ELPH-MCNC: 2.9 G/DL — LOW (ref 3.5–5)
ALP SERPL-CCNC: 62 U/L — SIGNIFICANT CHANGE UP (ref 40–120)
ALT FLD-CCNC: 19 U/L DA — SIGNIFICANT CHANGE UP (ref 10–60)
ANION GAP SERPL CALC-SCNC: 9 MMOL/L — SIGNIFICANT CHANGE UP (ref 5–17)
ANISOCYTOSIS BLD QL: SLIGHT — SIGNIFICANT CHANGE UP
AST SERPL-CCNC: 19 U/L — SIGNIFICANT CHANGE UP (ref 10–40)
BASOPHILS # BLD AUTO: 0 K/UL — SIGNIFICANT CHANGE UP (ref 0–0.2)
BASOPHILS NFR BLD AUTO: 0 % — SIGNIFICANT CHANGE UP (ref 0–2)
BILIRUB SERPL-MCNC: 0.4 MG/DL — SIGNIFICANT CHANGE UP (ref 0.2–1.2)
BUN SERPL-MCNC: 57 MG/DL — HIGH (ref 7–18)
CALCIUM SERPL-MCNC: 8.4 MG/DL — SIGNIFICANT CHANGE UP (ref 8.4–10.5)
CHLORIDE SERPL-SCNC: 98 MMOL/L — SIGNIFICANT CHANGE UP (ref 96–108)
CO2 SERPL-SCNC: 27 MMOL/L — SIGNIFICANT CHANGE UP (ref 22–31)
CREAT SERPL-MCNC: 9.68 MG/DL — HIGH (ref 0.5–1.3)
EGFR: 6 ML/MIN/1.73M2 — LOW
EOSINOPHIL # BLD AUTO: 0 K/UL — SIGNIFICANT CHANGE UP (ref 0–0.5)
EOSINOPHIL NFR BLD AUTO: 0 % — SIGNIFICANT CHANGE UP (ref 0–6)
FERRITIN SERPL-MCNC: 869 NG/ML — HIGH (ref 30–400)
GLUCOSE BLDC GLUCOMTR-MCNC: 124 MG/DL — HIGH (ref 70–99)
GLUCOSE BLDC GLUCOMTR-MCNC: 185 MG/DL — HIGH (ref 70–99)
GLUCOSE BLDC GLUCOMTR-MCNC: 250 MG/DL — HIGH (ref 70–99)
GLUCOSE SERPL-MCNC: 88 MG/DL — SIGNIFICANT CHANGE UP (ref 70–99)
HCT VFR BLD CALC: 24.1 % — LOW (ref 39–50)
HGB BLD-MCNC: 7.7 G/DL — LOW (ref 13–17)
HYPOCHROMIA BLD QL: SLIGHT — SIGNIFICANT CHANGE UP
LYMPHOCYTES # BLD AUTO: 0.45 K/UL — LOW (ref 1–3.3)
LYMPHOCYTES # BLD AUTO: 31 % — SIGNIFICANT CHANGE UP (ref 13–44)
MAGNESIUM SERPL-MCNC: 2.6 MG/DL — SIGNIFICANT CHANGE UP (ref 1.6–2.6)
MANUAL SMEAR VERIFICATION: SIGNIFICANT CHANGE UP
MCHC RBC-ENTMCNC: 29.4 PG — SIGNIFICANT CHANGE UP (ref 27–34)
MCHC RBC-ENTMCNC: 32 GM/DL — SIGNIFICANT CHANGE UP (ref 32–36)
MCV RBC AUTO: 92 FL — SIGNIFICANT CHANGE UP (ref 80–100)
MICROCYTES BLD QL: SLIGHT — SIGNIFICANT CHANGE UP
MONOCYTES # BLD AUTO: 0.29 K/UL — SIGNIFICANT CHANGE UP (ref 0–0.9)
MONOCYTES NFR BLD AUTO: 20 % — HIGH (ref 2–14)
NEUTROPHILS # BLD AUTO: 0.71 K/UL — LOW (ref 1.8–7.4)
NEUTROPHILS NFR BLD AUTO: 49 % — SIGNIFICANT CHANGE UP (ref 43–77)
NRBC # BLD: 0 /100 — SIGNIFICANT CHANGE UP (ref 0–0)
PHOSPHATE SERPL-MCNC: 4.2 MG/DL — SIGNIFICANT CHANGE UP (ref 2.5–4.5)
PLAT MORPH BLD: ABNORMAL
PLATELET # BLD AUTO: 64 K/UL — LOW (ref 150–400)
PLATELET COUNT - ESTIMATE: ABNORMAL
POTASSIUM SERPL-MCNC: 5.3 MMOL/L — SIGNIFICANT CHANGE UP (ref 3.5–5.3)
POTASSIUM SERPL-SCNC: 5.3 MMOL/L — SIGNIFICANT CHANGE UP (ref 3.5–5.3)
PROT SERPL-MCNC: 5.7 G/DL — LOW (ref 6–8.3)
RBC # BLD: 2.62 M/UL — LOW (ref 4.2–5.8)
RBC # FLD: 16.8 % — HIGH (ref 10.3–14.5)
RBC BLD AUTO: ABNORMAL
SARS-COV-2 RNA SPEC QL NAA+PROBE: SIGNIFICANT CHANGE UP
SCHISTOCYTES BLD QL AUTO: SLIGHT — SIGNIFICANT CHANGE UP
SODIUM SERPL-SCNC: 134 MMOL/L — LOW (ref 135–145)
SPHEROCYTES BLD QL SMEAR: SLIGHT — SIGNIFICANT CHANGE UP
TRANSFERRIN SERPL-MCNC: 128 MG/DL — LOW (ref 200–360)
WBC # BLD: 1.45 K/UL — LOW (ref 3.8–10.5)
WBC # FLD AUTO: 1.45 K/UL — LOW (ref 3.8–10.5)

## 2022-05-01 RX ADMIN — Medication 25 MILLIGRAM(S): at 15:25

## 2022-05-01 RX ADMIN — Medication 1 GRAM(S): at 11:48

## 2022-05-01 RX ADMIN — Medication 1 GRAM(S): at 23:05

## 2022-05-01 RX ADMIN — SIMVASTATIN 40 MILLIGRAM(S): 20 TABLET, FILM COATED ORAL at 22:52

## 2022-05-01 RX ADMIN — Medication 1 GRAM(S): at 06:58

## 2022-05-01 RX ADMIN — NORTRIPTYLINE HYDROCHLORIDE 10 MILLIGRAM(S): 10 CAPSULE ORAL at 11:48

## 2022-05-01 RX ADMIN — SODIUM ZIRCONIUM CYCLOSILICATE 10 GRAM(S): 10 POWDER, FOR SUSPENSION ORAL at 11:49

## 2022-05-01 RX ADMIN — Medication 2: at 11:47

## 2022-05-01 RX ADMIN — SEVELAMER CARBONATE 2400 MILLIGRAM(S): 2400 POWDER, FOR SUSPENSION ORAL at 06:57

## 2022-05-01 RX ADMIN — Medication 25 MILLIGRAM(S): at 22:51

## 2022-05-01 RX ADMIN — ZOLPIDEM TARTRATE 5 MILLIGRAM(S): 10 TABLET ORAL at 23:04

## 2022-05-01 RX ADMIN — AMLODIPINE BESYLATE 10 MILLIGRAM(S): 2.5 TABLET ORAL at 06:59

## 2022-05-01 RX ADMIN — SEVELAMER CARBONATE 2400 MILLIGRAM(S): 2400 POWDER, FOR SUSPENSION ORAL at 15:26

## 2022-05-01 RX ADMIN — Medication 25 MICROGRAM(S): at 06:58

## 2022-05-01 RX ADMIN — Medication 25 MILLIGRAM(S): at 06:59

## 2022-05-01 RX ADMIN — Medication 12.5 MILLIGRAM(S): at 07:01

## 2022-05-01 RX ADMIN — Medication 1 MILLIGRAM(S): at 11:48

## 2022-05-01 RX ADMIN — SEVELAMER CARBONATE 2400 MILLIGRAM(S): 2400 POWDER, FOR SUSPENSION ORAL at 22:51

## 2022-05-01 NOTE — PROGRESS NOTE ADULT - SUBJECTIVE AND OBJECTIVE BOX
Patient is a 61y Male with  ESRD  ON  HD     HAD  SHORT  HD  YESTERDAY, REFUSED TO STAY  LONGER   HAS NO  COMPLAINTS AT THE TIME  OF  EXAM      fish (Rash)  liver (Anaphylaxis)  No Known Drug Allergies    Hospital Medications:   MEDICATIONS  (STANDING):  amLODIPine   Tablet 10 milliGRAM(s) Oral daily  epoetin beverley-epbx (RETACRIT) Injectable 8000 Unit(s) IV Push <User Schedule>  folic acid 1 milliGRAM(s) Oral daily  hydrALAZINE 25 milliGRAM(s) Oral three times a day  insulin lispro (ADMELOG) corrective regimen sliding scale   SubCutaneous three times a day before meals  insulin lispro (ADMELOG) corrective regimen sliding scale   SubCutaneous at bedtime  levothyroxine 25 MICROGram(s) Oral daily  metoprolol tartrate 12.5 milliGRAM(s) Oral two times a day  nortriptyline 10 milliGRAM(s) Oral daily  pantoprazole  Injectable 40 milliGRAM(s) IV Push every 12 hours  sevelamer carbonate 2400 milliGRAM(s) Oral every 8 hours  simvastatin 40 milliGRAM(s) Oral at bedtime  sodium zirconium cyclosilicate 10 Gram(s) Oral daily  sucralfate 1 Gram(s) Oral four times a day    REVIEW OF SYSTEMS:  FEELS OK    HAS NO   FEVER  CHILLS   NO   SOB  OR  COUGH   NO CH  PAIN  / PALPITATIONS   APPETITE IS  GOOD, NO  N/V  OR  ABD  PAIN   NO LEG  SWELLING R,  L  BKA    VITALS:  T(F): 98.6 (05-01-22 @ 11:47), Max: 98.9 (04-30-22 @ 21:33)  HR: 87 (05-01-22 @ 11:47)  BP: 107/49 (05-01-22 @ 11:47)  RR: 16 (05-01-22 @ 11:47)  SpO2: 96% (05-01-22 @ 11:47)  Wt(kg): --    04-30 @ 07:01  -  05-01 @ 07:00  --------------------------------------------------------  IN: 600 mL / OUT: 1775 mL / NET: -1175 mL        PHYSICAL EXAM:  Constitutional: NAD  HEENT: CONJ  PALE  Neck: No JVD  Respiratory: CTAB,  Cardiovascular: S1, S2, RRR  Gastrointestinal: BS+, soft, NT/ND  Extremities:  No peripheral edema R  L- BKA  Neurological: A/O x   :  No [place].     Vascular Access: AVF  R  UPPER  ARM, WORKING WELL    LABS:  05-01    134<L>  |  98  |  57<H>  ----------------------------<  88  5.3   |  27  |  9.68<H>    Ca    8.4      01 May 2022 06:45  Phos  4.2     05-01  Mg     2.6     05-01    TPro  5.7<L>  /  Alb  2.9<L>  /  TBili  0.4  /  DBili      /  AST  19  /  ALT  19  /  AlkPhos  62  05-01    Creatinine Trend: 9.68 <--, 8.50 <--, 12.00 <--, 10.00 <--, 11.10 <--, 8.61 <--                        7.7    1.45  )-----------( 64       ( 01 May 2022 06:45 )             24.1     Urine Studies:      RADIOLOGY & ADDITIONAL STUDIES:

## 2022-05-01 NOTE — PROGRESS NOTE ADULT - SUBJECTIVE AND OBJECTIVE BOX
Patient is a 61y old  Male who presents with a chief complaint of Hematemesis (30 Apr 2022 19:30)    PATIENT IS SEEN AND EXAMINED IN MEDICAL FLOOR.  NGT [    ]    JEREMY [   ]      GT [   ]    ALLERGIES:  fish (Rash)  liver (Anaphylaxis)  No Known Drug Allergies      Daily     Daily     VITALS:    Vital Signs Last 24 Hrs  T(C): 37.1 (01 May 2022 06:36), Max: 37.2 (30 Apr 2022 21:33)  T(F): 98.7 (01 May 2022 06:36), Max: 98.9 (30 Apr 2022 21:33)  HR: 80 (01 May 2022 06:36) (80 - 88)  BP: 136/71 (01 May 2022 06:36) (96/61 - 136/71)  BP(mean): --  RR: 18 (01 May 2022 06:36) (18 - 18)  SpO2: 96% (01 May 2022 06:36) (96% - 98%)    LABS:    CBC Full  -  ( 01 May 2022 06:45 )  WBC Count : 1.45 K/uL  RBC Count : 2.62 M/uL  Hemoglobin : 7.7 g/dL  Hematocrit : 24.1 %  Platelet Count - Automated : 64 K/uL  Mean Cell Volume : 92.0 fl  Mean Cell Hemoglobin : 29.4 pg  Mean Cell Hemoglobin Concentration : 32.0 gm/dL  Auto Neutrophil # : 0.71 K/uL  Auto Lymphocyte # : 0.45 K/uL  Auto Monocyte # : 0.29 K/uL  Auto Eosinophil # : 0.00 K/uL  Auto Basophil # : 0.00 K/uL  Auto Neutrophil % : 49.0 %  Auto Lymphocyte % : 31.0 %  Auto Monocyte % : 20.0 %  Auto Eosinophil % : 0.0 %  Auto Basophil % : 0.0 %      05-01    134<L>  |  98  |  57<H>  ----------------------------<  88  5.3   |  27  |  9.68<H>    Ca    8.4      01 May 2022 06:45  Phos  4.2     05-01  Mg     2.6     05-01    TPro  5.7<L>  /  Alb  2.9<L>  /  TBili  0.4  /  DBili  x   /  AST  19  /  ALT  19  /  AlkPhos  62  05-01    CAPILLARY BLOOD GLUCOSE      POCT Blood Glucose.: 124 mg/dL (01 May 2022 08:13)  POCT Blood Glucose.: 141 mg/dL (30 Apr 2022 21:13)  POCT Blood Glucose.: 117 mg/dL (30 Apr 2022 16:41)  POCT Blood Glucose.: 107 mg/dL (30 Apr 2022 11:17)        LIVER FUNCTIONS - ( 01 May 2022 06:45 )  Alb: 2.9 g/dL / Pro: 5.7 g/dL / ALK PHOS: 62 U/L / ALT: 19 U/L DA / AST: 19 U/L / GGT: x           Creatinine Trend: 9.68<--, 8.50<--, 12.00<--, 10.00<--, 11.10<--, 8.61<--  I&O's Summary    30 Apr 2022 07:01  -  01 May 2022 07:00  --------------------------------------------------------  IN: 600 mL / OUT: 1775 mL / NET: -1175 mL            .Blood Blood-Peripheral  02-22 @ 03:45   No Growth Final  --  --          MEDICATIONS:    MEDICATIONS  (STANDING):  amLODIPine   Tablet 10 milliGRAM(s) Oral daily  epoetin beverley-epbx (RETACRIT) Injectable 8000 Unit(s) IV Push <User Schedule>  folic acid 1 milliGRAM(s) Oral daily  hydrALAZINE 25 milliGRAM(s) Oral three times a day  insulin lispro (ADMELOG) corrective regimen sliding scale   SubCutaneous three times a day before meals  insulin lispro (ADMELOG) corrective regimen sliding scale   SubCutaneous at bedtime  levothyroxine 25 MICROGram(s) Oral daily  metoprolol tartrate 12.5 milliGRAM(s) Oral two times a day  nortriptyline 10 milliGRAM(s) Oral daily  pantoprazole  Injectable 40 milliGRAM(s) IV Push every 12 hours  sevelamer carbonate 2400 milliGRAM(s) Oral every 8 hours  simvastatin 40 milliGRAM(s) Oral at bedtime  sodium zirconium cyclosilicate 10 Gram(s) Oral daily  sucralfate 1 Gram(s) Oral four times a day      MEDICATIONS  (PRN):  acetaminophen     Tablet .. 650 milliGRAM(s) Oral once PRN Mild Pain (1 - 3), Moderate Pain (4 - 6)  ondansetron    Tablet 4 milliGRAM(s) Oral every 6 hours PRN Nausea and/or Vomiting  zolpidem 5 milliGRAM(s) Oral at bedtime PRN Insomnia      REVIEW OF SYSTEMS:                           ALL ROS DONE [ X   ]    CONSTITUTIONAL:  LETHARGIC [   ], FEVER [   ], UNRESPONSIVE [   ]  CVS:  CP  [   ], SOB, [   ], PALPITATIONS [   ], DIZZYNESS [   ]  RS: COUGH [   ], SPUTUM [   ]  GI: ABDOMINAL PAIN [   ], NAUSEA [   ], VOMITINGS [   ], DIARRHEA [   ], CONSTIPATION [   ]  :  DYSURIA [   ], NOCTURIA [   ], INCREASED FREQUENCY [   ], DRIBLING [   ],  SKELETAL: PAINFUL JOINTS [   ], SWOLLEN JOINTS [   ], NECK ACHE [   ], LOW BACK ACHE [   ],  SKIN : ULCERS [   ], RASH [   ], ITCHING [   ]  CNS: HEAD ACHE [   ], DOUBLE VISION [   ], BLURRED VISION [   ], AMS / CONFUSION [   ], SEIZURES [   ], WEAKNESS [   ],TINGLING / NUMBNESS [   ]    PHYSICAL EXAMINATION:  GENERAL APPEARANCE: NO DISTRESS  HEENT:  NO PALLOR, NO  JVD,  NO   NODES, NECK SUPPLE  CVS: S1 +, S2 +,   RS: AEEB,  OCCASIONAL  RALES +,   NO RONCHI  ABD: SOFT, NT, NO, BS +  EXT: NO PE  SKIN: WARM,   SKELETAL:  ROM ACCEPTABLE  CNS:  AAO X    ,   DEFICITS    RADIOLOGY :      ASSESSMENT :     Hematemesis    No pertinent past medical history    Hypertension    Adrenal insufficiency    CKD (chronic kidney disease)    Anemia    Glaucoma    Coronary artery disease    HLD (hyperlipidemia)    Peripheral vascular disease    Spinal stenosis of lumbosacral region    Hyperparathyroidism    Diabetes mellitus    Diabetic neuropathy    Contracture of hand    Osteoarthritis    Osteoporosis    Vision loss of left eye    ESRD on hemodialysis    Cataract    BPH (benign prostatic hyperplasia)    UTI (urinary tract infection)    Bladder mass    H/O hematuria    Osteoporosis    Vision loss of right eye    Depression    Chronic GERD    Osteomyelitis of vertebra    No significant past surgical history    Below knee amputation status, left    History of right cataract extraction    History of left cataract extraction    S/P arteriovenous (AV) fistula creation    H/O hematuria    H/O transurethral destruction of bladder lesion    History of excision of mass        PLAN:  HPI:  Patient is a 60 yo M from [facility] with PMH of prior upper GI bleed, hypertension, anemia, CAD, hyperlipidemia, diabetes mellitus, GERD and adrenal insuffiencey, ESRD on HD T,Th,S reports to the ED for vomiting blood. Patient noted to  have one episode of dark vomiting and complains of nausea. Patient denies any abdominal pain, dysphagia, hematochezia or melena. No c/o chest pain, shortness of breath, fever, headache, urinary complaints. No recent travel/ change in meds.   (27 Apr 2022 14:15)        # RECURRENT EMESIS [?HEMATEMESIS] W/ HISTORY OF ESOPHAGITIS AND DUODENITIS [1/2021], HX OF GASTROPARESIS W/ EVIDENCE OF THICKENED ESOPHAGUS ON CT SCAN [11/9]   - MONITORING HGB, PLACED ON PPI BID,   - CARAFATE [ONCE PO INTAKE RESUMED], PRN ANTIEMETICS  - CLD, MONITORING FOR EMESIS  - PLANNED FOR EGD 4/29  - GASTROENTEROLOGY CONSULT - DR. PONCE    # ESRD ON HD TTS - W/ HX OF NONCOMPLIANCE - NEPHROLOGY CONSULT IN PROGRESS  # HYPERKALEMIA - GIVEN LOKELMA, F/U REPEAT POTASSIUM  - EKG ORDERED  - S/P PARTIAL HD ON [4/28] ; PATIENT COUNSELLED AT LENGTH ABOUT IMPORTANCE OF COMPLIANCE W/ COMPLETE HD SESSION AND RISKS INCLUDING BUT NOT LIMITED TO DEATH. PATIENT ACKNOWLEDGED UNDERSTANDING.  - NEPHROLOGY CONSULT IN PROGRESS    # SEVERE PROTEIN CALORIE MALNUTRITION, FAILURE TO THRIVE - NUTRITIONAL SUPPLEMENT  # HX OF ANEMIA OF CKD  # HLD  # HTN  # DM   # PARTIALLY BLIND  # S/P LEFT BKA  # HX OF PVD  # LS SPINAL STENOSIS  # GI AND DVT PPX .           Patient is a 61y old  Male who presents with a chief complaint of Hematemesis (30 Apr 2022 19:30)    PATIENT IS SEEN AND EXAMINED IN MEDICAL FLOOR.  NGT [    ]    JEREMY [   ]      GT [   ]    ALLERGIES:  fish (Rash)  liver (Anaphylaxis)  No Known Drug Allergies      Daily     Daily     VITALS:    Vital Signs Last 24 Hrs  T(C): 37.1 (01 May 2022 06:36), Max: 37.2 (30 Apr 2022 21:33)  T(F): 98.7 (01 May 2022 06:36), Max: 98.9 (30 Apr 2022 21:33)  HR: 80 (01 May 2022 06:36) (80 - 88)  BP: 136/71 (01 May 2022 06:36) (96/61 - 136/71)  BP(mean): --  RR: 18 (01 May 2022 06:36) (18 - 18)  SpO2: 96% (01 May 2022 06:36) (96% - 98%)    LABS:    CBC Full  -  ( 01 May 2022 06:45 )  WBC Count : 1.45 K/uL  RBC Count : 2.62 M/uL  Hemoglobin : 7.7 g/dL  Hematocrit : 24.1 %  Platelet Count - Automated : 64 K/uL  Mean Cell Volume : 92.0 fl  Mean Cell Hemoglobin : 29.4 pg  Mean Cell Hemoglobin Concentration : 32.0 gm/dL  Auto Neutrophil # : 0.71 K/uL  Auto Lymphocyte # : 0.45 K/uL  Auto Monocyte # : 0.29 K/uL  Auto Eosinophil # : 0.00 K/uL  Auto Basophil # : 0.00 K/uL  Auto Neutrophil % : 49.0 %  Auto Lymphocyte % : 31.0 %  Auto Monocyte % : 20.0 %  Auto Eosinophil % : 0.0 %  Auto Basophil % : 0.0 %      05-01    134<L>  |  98  |  57<H>  ----------------------------<  88  5.3   |  27  |  9.68<H>    Ca    8.4      01 May 2022 06:45  Phos  4.2     05-01  Mg     2.6     05-01    TPro  5.7<L>  /  Alb  2.9<L>  /  TBili  0.4  /  DBili  x   /  AST  19  /  ALT  19  /  AlkPhos  62  05-01    CAPILLARY BLOOD GLUCOSE      POCT Blood Glucose.: 124 mg/dL (01 May 2022 08:13)  POCT Blood Glucose.: 141 mg/dL (30 Apr 2022 21:13)  POCT Blood Glucose.: 117 mg/dL (30 Apr 2022 16:41)  POCT Blood Glucose.: 107 mg/dL (30 Apr 2022 11:17)        LIVER FUNCTIONS - ( 01 May 2022 06:45 )  Alb: 2.9 g/dL / Pro: 5.7 g/dL / ALK PHOS: 62 U/L / ALT: 19 U/L DA / AST: 19 U/L / GGT: x           Creatinine Trend: 9.68<--, 8.50<--, 12.00<--, 10.00<--, 11.10<--, 8.61<--  I&O's Summary    30 Apr 2022 07:01  -  01 May 2022 07:00  --------------------------------------------------------  IN: 600 mL / OUT: 1775 mL / NET: -1175 mL            .Blood Blood-Peripheral  02-22 @ 03:45   No Growth Final  --  --          MEDICATIONS:    MEDICATIONS  (STANDING):  amLODIPine   Tablet 10 milliGRAM(s) Oral daily  epoetin beverley-epbx (RETACRIT) Injectable 8000 Unit(s) IV Push <User Schedule>  folic acid 1 milliGRAM(s) Oral daily  hydrALAZINE 25 milliGRAM(s) Oral three times a day  insulin lispro (ADMELOG) corrective regimen sliding scale   SubCutaneous three times a day before meals  insulin lispro (ADMELOG) corrective regimen sliding scale   SubCutaneous at bedtime  levothyroxine 25 MICROGram(s) Oral daily  metoprolol tartrate 12.5 milliGRAM(s) Oral two times a day  nortriptyline 10 milliGRAM(s) Oral daily  pantoprazole  Injectable 40 milliGRAM(s) IV Push every 12 hours  sevelamer carbonate 2400 milliGRAM(s) Oral every 8 hours  simvastatin 40 milliGRAM(s) Oral at bedtime  sodium zirconium cyclosilicate 10 Gram(s) Oral daily  sucralfate 1 Gram(s) Oral four times a day      MEDICATIONS  (PRN):  acetaminophen     Tablet .. 650 milliGRAM(s) Oral once PRN Mild Pain (1 - 3), Moderate Pain (4 - 6)  ondansetron    Tablet 4 milliGRAM(s) Oral every 6 hours PRN Nausea and/or Vomiting  zolpidem 5 milliGRAM(s) Oral at bedtime PRN Insomnia      REVIEW OF SYSTEMS:                           ALL ROS DONE [ X   ]    CONSTITUTIONAL:  LETHARGIC [   ], FEVER [   ], UNRESPONSIVE [   ]  CVS:  CP  [   ], SOB, [   ], PALPITATIONS [   ], DIZZYNESS [   ]  RS: COUGH [   ], SPUTUM [   ]  GI: ABDOMINAL PAIN [   ], NAUSEA [   ], VOMITINGS [   ], DIARRHEA [   ], CONSTIPATION [   ]  :  DYSURIA [   ], NOCTURIA [   ], INCREASED FREQUENCY [   ], DRIBLING [   ],  SKELETAL: PAINFUL JOINTS [   ], SWOLLEN JOINTS [   ], NECK ACHE [   ], LOW BACK ACHE [   ],  SKIN : ULCERS [   ], RASH [   ], ITCHING [   ]  CNS: HEAD ACHE [   ], DOUBLE VISION [   ], BLURRED VISION [   ], AMS / CONFUSION [   ], SEIZURES [   ], WEAKNESS [   ],TINGLING / NUMBNESS [   ]    PHYSICAL EXAMINATION:  GENERAL APPEARANCE: NO DISTRESS  HEENT:  NO PALLOR, NO  JVD,  NO   NODES, NECK SUPPLE  CVS: S1 +, S2 +,   RS: AEEB,  OCCASIONAL  RALES +,   NO RONCHI  ABD: SOFT, NT, NO, BS +  EXT: NO PE   , LEFT BKA +  SKIN: WARM, RIGHT UPPER EXTREMITY AF +  SKELETAL:  ROM ACCEPTABLE  CNS:  AAO X 3,  VISUALLY IMPAIRED +    RADIOLOGY :    RADIOLOGY REVIEWED    ASSESSMENT :     Hematemesis    No pertinent past medical history    Hypertension    Adrenal insufficiency    CKD (chronic kidney disease)    Anemia    Glaucoma    Coronary artery disease    HLD (hyperlipidemia)    Peripheral vascular disease    Spinal stenosis of lumbosacral region    Hyperparathyroidism    Diabetes mellitus    Diabetic neuropathy    Contracture of hand    Osteoarthritis    Osteoporosis    Vision loss of left eye    ESRD on hemodialysis    Cataract    BPH (benign prostatic hyperplasia)    UTI (urinary tract infection)    Bladder mass    H/O hematuria    Osteoporosis    Vision loss of right eye    Depression    Chronic GERD    Osteomyelitis of vertebra    No significant past surgical history    Below knee amputation status, left    History of right cataract extraction    History of left cataract extraction    S/P arteriovenous (AV) fistula creation    H/O hematuria    H/O transurethral destruction of bladder lesion    History of excision of mass        PLAN:  HPI:  Patient is a 62 yo M from [facility] with PMH of prior upper GI bleed, hypertension, anemia, CAD, hyperlipidemia, diabetes mellitus, GERD and adrenal insuffiencey, ESRD on HD T,Th,S reports to the ED for vomiting blood. Patient noted to  have one episode of dark vomiting and complains of nausea. Patient denies any abdominal pain, dysphagia, hematochezia or melena. No c/o chest pain, shortness of breath, fever, headache, urinary complaints. No recent travel/ change in meds.   (27 Apr 2022 14:15)    # D/C IN A.M. TO [facility], IF MEDICALLY STABLE.      # PATIENT WAS NONCOMPLIANT BLOODWORK, THUS PROCEDURE WAS CANCELLED. PATIENT WAS COUNSELLED AT LENGTH ON 4/28 AND 4/29 REGARDING THE IMPORTANCE OF MONITORING ELECTROLYTES AND BLOODWORK, IMPORTANCE OF COMPLIANCE WITH HD AND WITH PURSUING EVALUATION WITH EGD. PATIENT VERBALIZED UNDERSTANDING THAT NONCOMPLIANCE COULD RESULT IN WORSENING MORBIDITY AND EVEN DEATH. PATIENT EXPRESSED THAT DESPITE THE RISKS, AT THIS TIME HE WISHES TO DEFER EGD.     # RECURRENT EMESIS [?HEMATEMESIS] W/ HISTORY OF ESOPHAGITIS AND DUODENITIS [1/2021], HX OF GASTROPARESIS W/ EVIDENCE OF THICKENED ESOPHAGUS ON CT SCAN [11/9]   - MONITORING HGB, PLACED ON PPI BID,   - CARAFATE [ONCE PO INTAKE RESUMED], PRN ANTIEMETICS  - CLD, MONITORING FOR EMESIS  - PLANNED FOR EGD 4/29  - GASTROENTEROLOGY CONSULT - DR. PONCE    - [4/30] - OVERNIGHT EPISODE OF EMESIS NBNB, MONITORING HGB AND SYMPTOMS CLOSELY  - [5/1] - NO FURTHER EPISODES OF EMESIS OVERNIGHT    # ESRD ON HD TTS - W/ HX OF NONCOMPLIANCE  # HYPERKALEMIA - RESOLVED  - EKG ORDERED  - S/P PARTIAL HD ON [4/28] ; PATIENT COUNSELLED AT LENGTH ABOUT IMPORTANCE OF COMPLIANCE W/ COMPLETE HD SESSION AND RISKS INCLUDING BUT NOT LIMITED TO DEATH. PATIENT ACKNOWLEDGED UNDERSTANDING.  - NEPHROLOGY CONSULT IN PROGRESS    # PANCYTOPENIA   - F/U ANEMIA PANEL  - F/U HIV AND HEPATITIS    - TRENDING HGB  - TYPE AND SCREEN    # SEVERE PROTEIN CALORIE MALNUTRITION, FAILURE TO THRIVE - NUTRITIONAL SUPPLEMENT  # HX OF ANEMIA OF CKD  # HLD  # HTN  # DM   # PARTIALLY BLIND  # S/P LEFT BKA  # HX OF PVD  # LS SPINAL STENOSIS  # GI AND DVT PPX.

## 2022-05-02 LAB
ALBUMIN SERPL ELPH-MCNC: 3 G/DL — LOW (ref 3.5–5)
ALP SERPL-CCNC: 69 U/L — SIGNIFICANT CHANGE UP (ref 40–120)
ALT FLD-CCNC: 20 U/L DA — SIGNIFICANT CHANGE UP (ref 10–60)
ANION GAP SERPL CALC-SCNC: 9 MMOL/L — SIGNIFICANT CHANGE UP (ref 5–17)
AST SERPL-CCNC: 15 U/L — SIGNIFICANT CHANGE UP (ref 10–40)
BASOPHILS # BLD AUTO: 0.01 K/UL — SIGNIFICANT CHANGE UP (ref 0–0.2)
BASOPHILS NFR BLD AUTO: 0.5 % — SIGNIFICANT CHANGE UP (ref 0–2)
BILIRUB DIRECT SERPL-MCNC: 0.2 MG/DL — SIGNIFICANT CHANGE UP (ref 0–0.3)
BILIRUB INDIRECT FLD-MCNC: 0.1 MG/DL — LOW (ref 0.2–1)
BILIRUB SERPL-MCNC: 0.3 MG/DL — SIGNIFICANT CHANGE UP (ref 0.2–1.2)
BUN SERPL-MCNC: 75 MG/DL — HIGH (ref 7–18)
CALCIUM SERPL-MCNC: 7.9 MG/DL — LOW (ref 8.4–10.5)
CHLORIDE SERPL-SCNC: 99 MMOL/L — SIGNIFICANT CHANGE UP (ref 96–108)
CO2 SERPL-SCNC: 27 MMOL/L — SIGNIFICANT CHANGE UP (ref 22–31)
CREAT SERPL-MCNC: 12.4 MG/DL — HIGH (ref 0.5–1.3)
EGFR: 4 ML/MIN/1.73M2 — LOW
EOSINOPHIL # BLD AUTO: 0.13 K/UL — SIGNIFICANT CHANGE UP (ref 0–0.5)
EOSINOPHIL NFR BLD AUTO: 5.9 % — SIGNIFICANT CHANGE UP (ref 0–6)
GLUCOSE BLDC GLUCOMTR-MCNC: 116 MG/DL — HIGH (ref 70–99)
GLUCOSE BLDC GLUCOMTR-MCNC: 151 MG/DL — HIGH (ref 70–99)
GLUCOSE BLDC GLUCOMTR-MCNC: 175 MG/DL — HIGH (ref 70–99)
GLUCOSE BLDC GLUCOMTR-MCNC: 261 MG/DL — HIGH (ref 70–99)
GLUCOSE SERPL-MCNC: 125 MG/DL — HIGH (ref 70–99)
HCT VFR BLD CALC: 25.5 % — LOW (ref 39–50)
HGB BLD-MCNC: 8.4 G/DL — LOW (ref 13–17)
IMM GRANULOCYTES NFR BLD AUTO: 0.5 % — SIGNIFICANT CHANGE UP (ref 0–1.5)
LYMPHOCYTES # BLD AUTO: 0.45 K/UL — LOW (ref 1–3.3)
LYMPHOCYTES # BLD AUTO: 20.4 % — SIGNIFICANT CHANGE UP (ref 13–44)
MAGNESIUM SERPL-MCNC: 3 MG/DL — HIGH (ref 1.6–2.6)
MCHC RBC-ENTMCNC: 29.4 PG — SIGNIFICANT CHANGE UP (ref 27–34)
MCHC RBC-ENTMCNC: 32.9 GM/DL — SIGNIFICANT CHANGE UP (ref 32–36)
MCV RBC AUTO: 89.2 FL — SIGNIFICANT CHANGE UP (ref 80–100)
MONOCYTES # BLD AUTO: 0.18 K/UL — SIGNIFICANT CHANGE UP (ref 0–0.9)
MONOCYTES NFR BLD AUTO: 8.1 % — SIGNIFICANT CHANGE UP (ref 2–14)
NEUTROPHILS # BLD AUTO: 1.43 K/UL — LOW (ref 1.8–7.4)
NEUTROPHILS NFR BLD AUTO: 64.6 % — SIGNIFICANT CHANGE UP (ref 43–77)
NRBC # BLD: 0 /100 WBCS — SIGNIFICANT CHANGE UP (ref 0–0)
PHOSPHATE SERPL-MCNC: 4.9 MG/DL — HIGH (ref 2.5–4.5)
PLATELET # BLD AUTO: 85 K/UL — LOW (ref 150–400)
POTASSIUM SERPL-MCNC: 6.4 MMOL/L — CRITICAL HIGH (ref 3.5–5.3)
POTASSIUM SERPL-SCNC: 6.4 MMOL/L — CRITICAL HIGH (ref 3.5–5.3)
PROT SERPL-MCNC: 6.1 G/DL — SIGNIFICANT CHANGE UP (ref 6–8.3)
RBC # BLD: 2.86 M/UL — LOW (ref 4.2–5.8)
RBC # FLD: 16.8 % — HIGH (ref 10.3–14.5)
SODIUM SERPL-SCNC: 135 MMOL/L — SIGNIFICANT CHANGE UP (ref 135–145)
WBC # BLD: 2.21 K/UL — LOW (ref 3.8–10.5)
WBC # FLD AUTO: 2.21 K/UL — LOW (ref 3.8–10.5)

## 2022-05-02 RX ORDER — SODIUM POLYSTYRENE SULFONATE 4.1 MEQ/G
30 POWDER, FOR SUSPENSION ORAL ONCE
Refills: 0 | Status: COMPLETED | OUTPATIENT
Start: 2022-05-02 | End: 2022-05-02

## 2022-05-02 RX ORDER — CALCIUM GLUCONATE 100 MG/ML
2 VIAL (ML) INTRAVENOUS ONCE
Refills: 0 | Status: COMPLETED | OUTPATIENT
Start: 2022-05-02 | End: 2022-05-02

## 2022-05-02 RX ORDER — INSULIN HUMAN 100 [IU]/ML
5 INJECTION, SOLUTION SUBCUTANEOUS ONCE
Refills: 0 | Status: COMPLETED | OUTPATIENT
Start: 2022-05-02 | End: 2022-05-02

## 2022-05-02 RX ORDER — INSULIN HUMAN 100 [IU]/ML
10 INJECTION, SOLUTION SUBCUTANEOUS ONCE
Refills: 0 | Status: DISCONTINUED | OUTPATIENT
Start: 2022-05-02 | End: 2022-05-02

## 2022-05-02 RX ORDER — SODIUM ZIRCONIUM CYCLOSILICATE 10 G/10G
1 POWDER, FOR SUSPENSION ORAL
Qty: 0 | Refills: 0 | DISCHARGE

## 2022-05-02 RX ORDER — DEXTROSE 50 % IN WATER 50 %
50 SYRINGE (ML) INTRAVENOUS ONCE
Refills: 0 | Status: COMPLETED | OUTPATIENT
Start: 2022-05-02 | End: 2022-05-02

## 2022-05-02 RX ORDER — SODIUM ZIRCONIUM CYCLOSILICATE 10 G/10G
10 POWDER, FOR SUSPENSION ORAL THREE TIMES A DAY
Refills: 0 | Status: DISCONTINUED | OUTPATIENT
Start: 2022-05-02 | End: 2022-05-03

## 2022-05-02 RX ADMIN — SODIUM ZIRCONIUM CYCLOSILICATE 10 GRAM(S): 10 POWDER, FOR SUSPENSION ORAL at 21:31

## 2022-05-02 RX ADMIN — SEVELAMER CARBONATE 2400 MILLIGRAM(S): 2400 POWDER, FOR SUSPENSION ORAL at 21:26

## 2022-05-02 RX ADMIN — ONDANSETRON 4 MILLIGRAM(S): 8 TABLET, FILM COATED ORAL at 08:37

## 2022-05-02 RX ADMIN — ZOLPIDEM TARTRATE 5 MILLIGRAM(S): 10 TABLET ORAL at 21:26

## 2022-05-02 RX ADMIN — Medication 1 GRAM(S): at 17:44

## 2022-05-02 RX ADMIN — Medication 12.5 MILLIGRAM(S): at 17:43

## 2022-05-02 RX ADMIN — Medication 1: at 21:44

## 2022-05-02 RX ADMIN — Medication 25 MILLIGRAM(S): at 21:25

## 2022-05-02 RX ADMIN — SIMVASTATIN 40 MILLIGRAM(S): 20 TABLET, FILM COATED ORAL at 21:57

## 2022-05-02 NOTE — PROGRESS NOTE ADULT - SUBJECTIVE AND OBJECTIVE BOX
[facility] : Progress Note :: [phone], (office [phone]),   Dr Mosher / Dr Washington / Dr Young / Dr Doll / Dr Varsha TURCIOS / Dr Tello / Dr Garcia / Dr Larry qiu  _____________________________________________________________________________________________  cut HD treatment on Saturday  hyperkalemia    fish (Rash)  liver (Anaphylaxis)  No Known Drug Allergies    Hospital Medications:   MEDICATIONS  (STANDING):  amLODIPine   Tablet 10 milliGRAM(s) Oral daily  calcium gluconate IVPB 2 Gram(s) IV Intermittent once  dextrose 50% Injectable 50 milliLiter(s) IV Push once  epoetin beverley-epbx (RETACRIT) Injectable 8000 Unit(s) IV Push <User Schedule>  folic acid 1 milliGRAM(s) Oral daily  hydrALAZINE 25 milliGRAM(s) Oral three times a day  insulin lispro (ADMELOG) corrective regimen sliding scale   SubCutaneous three times a day before meals  insulin lispro (ADMELOG) corrective regimen sliding scale   SubCutaneous at bedtime  insulin regular  human recombinant. 10 Unit(s) IV Push once  levothyroxine 25 MICROGram(s) Oral daily  metoprolol tartrate 12.5 milliGRAM(s) Oral two times a day  nortriptyline 10 milliGRAM(s) Oral daily  pantoprazole  Injectable 40 milliGRAM(s) IV Push every 12 hours  sevelamer carbonate 2400 milliGRAM(s) Oral every 8 hours  simvastatin 40 milliGRAM(s) Oral at bedtime  sodium polystyrene sulfonate Suspension 30 Gram(s) Oral once  sodium zirconium cyclosilicate 10 Gram(s) Oral three times a day  sucralfate 1 Gram(s) Oral four times a day      VITALS:  T(F): 98.3 (05-02-22 @ 11:27), Max: 98.8 (05-02-22 @ 05:07)  HR: 86 (05-02-22 @ 11:27)  BP: 173/83 (05-02-22 @ 11:27)  RR: 16 (05-02-22 @ 11:27)  SpO2: 96% (05-02-22 @ 11:27)  Wt(kg): --      PHYSICAL EXAM:  Constitutional: NAD  HEENT: anicteric sclera, oropharynx clear.  Neck: No JVD  Respiratory: CTAB, no wheezes, rales or rhonchi  Cardiovascular: S1, S2, RRR  Gastrointestinal: BS+, soft, NT/ND  Extremities: No peripheral edema  Neurological: A/O x 3, no focal deficits  : No CVA tenderness. No [place].   Skin: No rashes  Vascular Access: AVF with thrill and bruit    LABS:  05-02    135  |  99  |  75<H>  ----------------------------<  125<H>  6.4<HH>   |  27  |  12.40<H>    Ca    7.9<L>      02 May 2022 10:32  Phos  4.9     05-02  Mg     3.0     05-02    TPro      /  Alb  3.0<L>  /  TBili      /  DBili      /  AST      /  ALT      /  AlkPhos      05-02    Creatinine Trend: 12.40 <--, 9.68 <--, 8.50 <--, 12.00 <--, 10.00 <--, 11.10 <--, 8.61 <--                        8.4    2.21  )-----------( 85       ( 02 May 2022 10:32 )             25.5     Urine Studies:      RADIOLOGY & ADDITIONAL STUDIES:

## 2022-05-02 NOTE — CHART NOTE - NSCHARTNOTEFT_GEN_A_CORE
Patient has K of 6.4, refusing K lowering   Refusing IV placements, wants to go home.  Relative Georgi King was asked to speak to the patient  Patient was convinced as long as he can still get discharged today. Will give cocktail +/- repeat BMP  Tentative DC to back to NH Patient has K of 6.4, refusing K lowering   Refusing IV placements, wants to go home.  Relative Georgi King was asked to speak to the patient  Patient was convinced as long as he can still get discharged today. Will give cocktail +/- repeat BMP  Tentative DC to back to NH    Later 5/2 afternoon patient refused cocktail  HD arranged for 5/3, with before and after labs

## 2022-05-02 NOTE — PROGRESS NOTE ADULT - SUBJECTIVE AND OBJECTIVE BOX
Patient is a 61y old  Male who presents with a chief complaint of Hematemesis (02 May 2022 11:42)    PATIENT IS SEEN AND EXAMINED IN MEDICAL FLOOR.  MARIIT [    ]    JEREMY [   ]      GT [   ]    ALLERGIES:  fish (Rash)  liver (Anaphylaxis)  No Known Drug Allergies      Daily     Daily     VITALS:    Vital Signs Last 24 Hrs  T(C): 37 (02 May 2022 21:15), Max: 37.1 (02 May 2022 05:07)  T(F): 98.6 (02 May 2022 21:15), Max: 98.8 (02 May 2022 05:07)  HR: 84 (02 May 2022 21:15) (84 - 92)  BP: 129/70 (02 May 2022 21:15) (129/70 - 173/83)  BP(mean): --  RR: 18 (02 May 2022 21:15) (16 - 18)  SpO2: 93% (02 May 2022 21:15) (93% - 96%)    LABS:    CBC Full  -  ( 02 May 2022 10:32 )  WBC Count : 2.21 K/uL  RBC Count : 2.86 M/uL  Hemoglobin : 8.4 g/dL  Hematocrit : 25.5 %  Platelet Count - Automated : 85 K/uL  Mean Cell Volume : 89.2 fl  Mean Cell Hemoglobin : 29.4 pg  Mean Cell Hemoglobin Concentration : 32.9 gm/dL  Auto Neutrophil # : 1.43 K/uL  Auto Lymphocyte # : 0.45 K/uL  Auto Monocyte # : 0.18 K/uL  Auto Eosinophil # : 0.13 K/uL  Auto Basophil # : 0.01 K/uL  Auto Neutrophil % : 64.6 %  Auto Lymphocyte % : 20.4 %  Auto Monocyte % : 8.1 %  Auto Eosinophil % : 5.9 %  Auto Basophil % : 0.5 %      05-02    135  |  99  |  75<H>  ----------------------------<  125<H>  6.4<HH>   |  27  |  12.40<H>    Ca    7.9<L>      02 May 2022 10:32  Phos  4.9     05-02  Mg     3.0     05-02    TPro  6.1  /  Alb  3.0<L>  /  TBili  0.3  /  DBili  0.2  /  AST  15  /  ALT  20  /  AlkPhos  69  05-02    CAPILLARY BLOOD GLUCOSE      POCT Blood Glucose.: 261 mg/dL (02 May 2022 21:32)  POCT Blood Glucose.: 175 mg/dL (02 May 2022 16:32)  POCT Blood Glucose.: 151 mg/dL (02 May 2022 11:36)  POCT Blood Glucose.: 116 mg/dL (02 May 2022 07:37)        LIVER FUNCTIONS - ( 02 May 2022 10:32 )  Alb: 3.0 g/dL / Pro: 6.1 g/dL / ALK PHOS: 69 U/L / ALT: 20 U/L DA / AST: 15 U/L / GGT: x           Creatinine Trend: 12.40<--, 9.68<--, 8.50<--, 12.00<--, 10.00<--, 11.10<--  I&O's Summary          .Blood Blood-Peripheral  02-22 @ 03:45   No Growth Final  --  --          MEDICATIONS:    MEDICATIONS  (STANDING):  amLODIPine   Tablet 10 milliGRAM(s) Oral daily  epoetin beverley-epbx (RETACRIT) Injectable 8000 Unit(s) IV Push <User Schedule>  folic acid 1 milliGRAM(s) Oral daily  hydrALAZINE 25 milliGRAM(s) Oral three times a day  insulin lispro (ADMELOG) corrective regimen sliding scale   SubCutaneous three times a day before meals  insulin lispro (ADMELOG) corrective regimen sliding scale   SubCutaneous at bedtime  levothyroxine 25 MICROGram(s) Oral daily  metoprolol tartrate 12.5 milliGRAM(s) Oral two times a day  nortriptyline 10 milliGRAM(s) Oral daily  pantoprazole  Injectable 40 milliGRAM(s) IV Push every 12 hours  sevelamer carbonate 2400 milliGRAM(s) Oral every 8 hours  simvastatin 40 milliGRAM(s) Oral at bedtime  sodium zirconium cyclosilicate 10 Gram(s) Oral three times a day  sucralfate 1 Gram(s) Oral four times a day      MEDICATIONS  (PRN):  acetaminophen     Tablet .. 650 milliGRAM(s) Oral once PRN Mild Pain (1 - 3), Moderate Pain (4 - 6)  ondansetron    Tablet 4 milliGRAM(s) Oral every 6 hours PRN Nausea and/or Vomiting  zolpidem 5 milliGRAM(s) Oral at bedtime PRN Insomnia      REVIEW OF SYSTEMS:                           ALL ROS DONE [ X   ]    CONSTITUTIONAL:  LETHARGIC [   ], FEVER [   ], UNRESPONSIVE [   ]  CVS:  CP  [   ], SOB, [   ], PALPITATIONS [   ], DIZZYNESS [   ]  RS: COUGH [   ], SPUTUM [   ]  GI: ABDOMINAL PAIN [   ], NAUSEA [   ], VOMITINGS [   ], DIARRHEA [   ], CONSTIPATION [   ]  :  DYSURIA [   ], NOCTURIA [   ], INCREASED FREQUENCY [   ], DRIBLING [   ],  SKELETAL: PAINFUL JOINTS [   ], SWOLLEN JOINTS [   ], NECK ACHE [   ], LOW BACK ACHE [   ],  SKIN : ULCERS [   ], RASH [   ], ITCHING [   ]  CNS: HEAD ACHE [   ], DOUBLE VISION [   ], BLURRED VISION [   ], AMS / CONFUSION [   ], SEIZURES [   ], WEAKNESS [   ],TINGLING / NUMBNESS [   ]    PHYSICAL EXAMINATION:  GENERAL APPEARANCE: NO DISTRESS  HEENT:  NO PALLOR, NO  JVD,  NO   NODES, NECK SUPPLE  CVS: S1 +, S2 +,   RS: AEEB,  OCCASIONAL  RALES +,   NO RONCHI  ABD: SOFT, NT, NO, BS +  EXT: NO PE   , LEFT BKA +  SKIN: WARM, RIGHT UPPER EXTREMITY AF +  SKELETAL:  ROM ACCEPTABLE  CNS:  AAO X 3,  VISUALLY IMPAIRED +    RADIOLOGY :    RADIOLOGY REVIEWED    ASSESSMENT :     Hematemesis    No pertinent past medical history    Hypertension    Adrenal insufficiency    CKD (chronic kidney disease)    Anemia    Glaucoma    Coronary artery disease    HLD (hyperlipidemia)    Peripheral vascular disease    Spinal stenosis of lumbosacral region    Hyperparathyroidism    Diabetes mellitus    Diabetic neuropathy    Contracture of hand    Osteoarthritis    Osteoporosis    Vision loss of left eye    ESRD on hemodialysis    Cataract    BPH (benign prostatic hyperplasia)    UTI (urinary tract infection)    Bladder mass    H/O hematuria    Osteoporosis    Vision loss of right eye    Depression    Chronic GERD    Osteomyelitis of vertebra    No significant past surgical history    Below knee amputation status, left    History of right cataract extraction    History of left cataract extraction    S/P arteriovenous (AV) fistula creation    H/O hematuria    H/O transurethral destruction of bladder lesion    History of excision of mass        PLAN:  HPI:  Patient is a 60 yo M from [facility] with PMH of prior upper GI bleed, hypertension, anemia, CAD, hyperlipidemia, diabetes mellitus, GERD and adrenal insuffiencey, ESRD on HD T,Th,S reports to the ED for vomiting blood. Patient noted to  have one episode of dark vomiting and complains of nausea. Patient denies any abdominal pain, dysphagia, hematochezia or melena. No c/o chest pain, shortness of breath, fever, headache, urinary complaints. No recent travel/ change in meds.   (27 Apr 2022 14:15)    # HYPERKALEMIA  - PATIENT GIVEN LOKELMA  - DESPITE EXTENSIVE COUNSELLING BY TEAM AND BY BROTHER - PATIENT REFUSED IV INTERVENTION FOR HYPERKALEMIA AND REFUSED REPEAT BLOODWORK. PATIENT VERBALIZED UNDERSTANDING THAT REFUSING INTERVENTION AND EVALUATION COULD BE FATAL, BUT HE CONTINUES TO REFUSED. AFTER EXTENSIVE COUNSELLING BY MYSELF, TEAM, ASSISTED LIVING STAFF [VIA PHONE] - PATIENT IS AGREEABLE TO HD AND RETURN TO ASSISTED LIVING ON 5/3. ONCE MORE CONVEYED TO PATIENT THE IMPORTANCE OF MONITORING ELECTROLYTES AND COMPLIANCE WITH HD. PATIENT EXPRESSED THAT HE DOES COMPLY WITH OUTPATIENT HD, BUT HE DOES NOT LIKE HD IN THE HOSPITAL.     # PATIENT WAS NONCOMPLIANT BLOODWORK, THUS PROCEDURE WAS CANCELLED. PATIENT WAS COUNSELLED AT LENGTH ON 4/28 AND 4/29 REGARDING THE IMPORTANCE OF MONITORING ELECTROLYTES AND BLOODWORK, IMPORTANCE OF COMPLIANCE WITH HD AND WITH PURSUING EVALUATION WITH EGD. PATIENT VERBALIZED UNDERSTANDING THAT NONCOMPLIANCE COULD RESULT IN WORSENING MORBIDITY AND EVEN DEATH. PATIENT EXPRESSED THAT DESPITE THE RISKS, AT THIS TIME HE WISHES TO DEFER EGD.     # RECURRENT EMESIS [?HEMATEMESIS] W/ HISTORY OF ESOPHAGITIS AND DUODENITIS [1/2021], HX OF GASTROPARESIS W/ EVIDENCE OF THICKENED ESOPHAGUS ON CT SCAN [11/9]   - MONITORING HGB, PLACED ON PPI BID,   - CARAFATE [ONCE PO INTAKE RESUMED], PRN ANTIEMETICS  - CLD, MONITORING FOR EMESIS  - PLANNED FOR EGD 4/29  - GASTROENTEROLOGY CONSULT - DR. PONCE    - [4/30] - OVERNIGHT EPISODE OF EMESIS NBNB, MONITORING HGB AND SYMPTOMS CLOSELY  - [5/1] - NO FURTHER EPISODES OF EMESIS OVERNIGHT    # ESRD ON HD TTS - W/ HX OF NONCOMPLIANCE  # HYPERKALEMIA - RESOLVED  - EKG ORDERED  - S/P PARTIAL HD ON [4/28] ; PATIENT COUNSELLED AT LENGTH ABOUT IMPORTANCE OF COMPLIANCE W/ COMPLETE HD SESSION AND RISKS INCLUDING BUT NOT LIMITED TO DEATH. PATIENT ACKNOWLEDGED UNDERSTANDING.  - NEPHROLOGY CONSULT IN PROGRESS    # PANCYTOPENIA   - F/U ANEMIA PANEL  - F/U HIV AND HEPATITIS    - TRENDING HGB  - TYPE AND SCREEN    # SEVERE PROTEIN CALORIE MALNUTRITION, FAILURE TO THRIVE - NUTRITIONAL SUPPLEMENT  # HX OF ANEMIA OF CKD  # HLD  # HTN  # DM   # PARTIALLY BLIND  # S/P LEFT BKA  # HX OF PVD  # LS SPINAL STENOSIS  # GI AND DVT PPX.   Patient is a 61y old  Male who presents with a chief complaint of Hematemesis (02 May 2022 11:42)    PATIENT IS SEEN AND EXAMINED IN MEDICAL FLOOR.  MARIIT [    ]    JEREMY [   ]      GT [   ]    ALLERGIES:  fish (Rash)  liver (Anaphylaxis)  No Known Drug Allergies      Daily     Daily     VITALS:    Vital Signs Last 24 Hrs  T(C): 37 (02 May 2022 21:15), Max: 37.1 (02 May 2022 05:07)  T(F): 98.6 (02 May 2022 21:15), Max: 98.8 (02 May 2022 05:07)  HR: 84 (02 May 2022 21:15) (84 - 92)  BP: 129/70 (02 May 2022 21:15) (129/70 - 173/83)  BP(mean): --  RR: 18 (02 May 2022 21:15) (16 - 18)  SpO2: 93% (02 May 2022 21:15) (93% - 96%)    LABS:    CBC Full  -  ( 02 May 2022 10:32 )  WBC Count : 2.21 K/uL  RBC Count : 2.86 M/uL  Hemoglobin : 8.4 g/dL  Hematocrit : 25.5 %  Platelet Count - Automated : 85 K/uL  Mean Cell Volume : 89.2 fl  Mean Cell Hemoglobin : 29.4 pg  Mean Cell Hemoglobin Concentration : 32.9 gm/dL  Auto Neutrophil # : 1.43 K/uL  Auto Lymphocyte # : 0.45 K/uL  Auto Monocyte # : 0.18 K/uL  Auto Eosinophil # : 0.13 K/uL  Auto Basophil # : 0.01 K/uL  Auto Neutrophil % : 64.6 %  Auto Lymphocyte % : 20.4 %  Auto Monocyte % : 8.1 %  Auto Eosinophil % : 5.9 %  Auto Basophil % : 0.5 %      05-02    135  |  99  |  75<H>  ----------------------------<  125<H>  6.4<HH>   |  27  |  12.40<H>    Ca    7.9<L>      02 May 2022 10:32  Phos  4.9     05-02  Mg     3.0     05-02    TPro  6.1  /  Alb  3.0<L>  /  TBili  0.3  /  DBili  0.2  /  AST  15  /  ALT  20  /  AlkPhos  69  05-02    CAPILLARY BLOOD GLUCOSE      POCT Blood Glucose.: 261 mg/dL (02 May 2022 21:32)  POCT Blood Glucose.: 175 mg/dL (02 May 2022 16:32)  POCT Blood Glucose.: 151 mg/dL (02 May 2022 11:36)  POCT Blood Glucose.: 116 mg/dL (02 May 2022 07:37)        LIVER FUNCTIONS - ( 02 May 2022 10:32 )  Alb: 3.0 g/dL / Pro: 6.1 g/dL / ALK PHOS: 69 U/L / ALT: 20 U/L DA / AST: 15 U/L / GGT: x           Creatinine Trend: 12.40<--, 9.68<--, 8.50<--, 12.00<--, 10.00<--, 11.10<--  I&O's Summary          .Blood Blood-Peripheral  02-22 @ 03:45   No Growth Final  --  --          MEDICATIONS:    MEDICATIONS  (STANDING):  amLODIPine   Tablet 10 milliGRAM(s) Oral daily  epoetin beverley-epbx (RETACRIT) Injectable 8000 Unit(s) IV Push <User Schedule>  folic acid 1 milliGRAM(s) Oral daily  hydrALAZINE 25 milliGRAM(s) Oral three times a day  insulin lispro (ADMELOG) corrective regimen sliding scale   SubCutaneous three times a day before meals  insulin lispro (ADMELOG) corrective regimen sliding scale   SubCutaneous at bedtime  levothyroxine 25 MICROGram(s) Oral daily  metoprolol tartrate 12.5 milliGRAM(s) Oral two times a day  nortriptyline 10 milliGRAM(s) Oral daily  pantoprazole  Injectable 40 milliGRAM(s) IV Push every 12 hours  sevelamer carbonate 2400 milliGRAM(s) Oral every 8 hours  simvastatin 40 milliGRAM(s) Oral at bedtime  sodium zirconium cyclosilicate 10 Gram(s) Oral three times a day  sucralfate 1 Gram(s) Oral four times a day      MEDICATIONS  (PRN):  acetaminophen     Tablet .. 650 milliGRAM(s) Oral once PRN Mild Pain (1 - 3), Moderate Pain (4 - 6)  ondansetron    Tablet 4 milliGRAM(s) Oral every 6 hours PRN Nausea and/or Vomiting  zolpidem 5 milliGRAM(s) Oral at bedtime PRN Insomnia      REVIEW OF SYSTEMS:                           ALL ROS DONE [ X   ]    CONSTITUTIONAL:  LETHARGIC [   ], FEVER [   ], UNRESPONSIVE [   ]  CVS:  CP  [   ], SOB, [   ], PALPITATIONS [   ], DIZZYNESS [   ]  RS: COUGH [   ], SPUTUM [   ]  GI: ABDOMINAL PAIN [   ], NAUSEA [   ], VOMITINGS [   ], DIARRHEA [   ], CONSTIPATION [   ]  :  DYSURIA [   ], NOCTURIA [   ], INCREASED FREQUENCY [   ], DRIBLING [   ],  SKELETAL: PAINFUL JOINTS [   ], SWOLLEN JOINTS [   ], NECK ACHE [   ], LOW BACK ACHE [   ],  SKIN : ULCERS [   ], RASH [   ], ITCHING [   ]  CNS: HEAD ACHE [   ], DOUBLE VISION [   ], BLURRED VISION [   ], AMS / CONFUSION [   ], SEIZURES [   ], WEAKNESS [   ],TINGLING / NUMBNESS [   ]    PHYSICAL EXAMINATION:  GENERAL APPEARANCE: NO DISTRESS  HEENT:  NO PALLOR, NO  JVD,  NO   NODES, NECK SUPPLE  CVS: S1 +, S2 +,   RS: AEEB,  OCCASIONAL  RALES +,   NO RONCHI  ABD: SOFT, NT, NO, BS +  EXT: NO PE   , LEFT BKA +  SKIN: WARM, RIGHT UPPER EXTREMITY AF +  SKELETAL:  ROM ACCEPTABLE  CNS:  AAO X 3,  VISUALLY IMPAIRED +    RADIOLOGY :    RADIOLOGY REVIEWED    ASSESSMENT :     Hematemesis    No pertinent past medical history    Hypertension    Adrenal insufficiency    CKD (chronic kidney disease)    Anemia    Glaucoma    Coronary artery disease    HLD (hyperlipidemia)    Peripheral vascular disease    Spinal stenosis of lumbosacral region    Hyperparathyroidism    Diabetes mellitus    Diabetic neuropathy    Contracture of hand    Osteoarthritis    Osteoporosis    Vision loss of left eye    ESRD on hemodialysis    Cataract    BPH (benign prostatic hyperplasia)    UTI (urinary tract infection)    Bladder mass    H/O hematuria    Osteoporosis    Vision loss of right eye    Depression    Chronic GERD    Osteomyelitis of vertebra    No significant past surgical history    Below knee amputation status, left    History of right cataract extraction    History of left cataract extraction    S/P arteriovenous (AV) fistula creation    H/O hematuria    H/O transurethral destruction of bladder lesion    History of excision of mass        PLAN:  HPI:  Patient is a 62 yo M from [facility] with PMH of prior upper GI bleed, hypertension, anemia, CAD, hyperlipidemia, diabetes mellitus, GERD and adrenal insuffiencey, ESRD on HD T,Th,S reports to the ED for vomiting blood. Patient noted to  have one episode of dark vomiting and complains of nausea. Patient denies any abdominal pain, dysphagia, hematochezia or melena. No c/o chest pain, shortness of breath, fever, headache, urinary complaints. No recent travel/ change in meds.   (27 Apr 2022 14:15)    # HYPERKALEMIA  - PATIENT GIVEN LOKELMA  - DESPITE EXTENSIVE COUNSELLING BY TEAM AND BY BROTHER - PATIENT REFUSED IV INTERVENTION FOR HYPERKALEMIA AND REFUSED REPEAT BLOODWORK. PATIENT VERBALIZED UNDERSTANDING THAT REFUSING INTERVENTION AND EVALUATION COULD BE FATAL, BUT HE CONTINUES TO REFUSED. AFTER EXTENSIVE COUNSELLING BY MYSELF, TEAM, ASSISTED LIVING STAFF [VIA PHONE] - PATIENT IS AGREEABLE TO HD AND RETURN TO ASSISTED LIVING ON 5/3. ONCE MORE CONVEYED TO PATIENT THE IMPORTANCE OF MONITORING ELECTROLYTES AND COMPLIANCE WITH HD. PATIENT EXPRESSED THAT HE DOES COMPLY WITH OUTPATIENT HD, BUT HE DOES NOT LIKE HD IN THE HOSPITAL.   - PLAN D/W NEPHROLOGY, CM/SW TEAM, RESIDENT TEAM    # PATIENT WAS NONCOMPLIANT BLOODWORK, THUS PROCEDURE WAS CANCELLED. PATIENT WAS COUNSELLED AT LENGTH ON 4/28 AND 4/29 REGARDING THE IMPORTANCE OF MONITORING ELECTROLYTES AND BLOODWORK, IMPORTANCE OF COMPLIANCE WITH HD AND WITH PURSUING EVALUATION WITH EGD. PATIENT VERBALIZED UNDERSTANDING THAT NONCOMPLIANCE COULD RESULT IN WORSENING MORBIDITY AND EVEN DEATH. PATIENT EXPRESSED THAT DESPITE THE RISKS, AT THIS TIME HE WISHES TO DEFER EGD.     # RECURRENT EMESIS [?HEMATEMESIS] W/ HISTORY OF ESOPHAGITIS AND DUODENITIS [1/2021], HX OF GASTROPARESIS W/ EVIDENCE OF THICKENED ESOPHAGUS ON CT SCAN [11/9]   - MONITORING HGB, PLACED ON PPI BID,   - CARAFATE [ONCE PO INTAKE RESUMED], PRN ANTIEMETICS  - CLD, MONITORING FOR EMESIS  - PLANNED FOR EGD 4/29  - GASTROENTEROLOGY CONSULT - DR. PONCE    - [4/30] - OVERNIGHT EPISODE OF EMESIS NBNB, MONITORING HGB AND SYMPTOMS CLOSELY  - [5/1] - NO FURTHER EPISODES OF EMESIS OVERNIGHT    # ESRD ON HD TTS - W/ HX OF NONCOMPLIANCE  # HYPERKALEMIA - RESOLVED  - EKG ORDERED  - S/P PARTIAL HD ON [4/28] ; PATIENT COUNSELLED AT LENGTH ABOUT IMPORTANCE OF COMPLIANCE W/ COMPLETE HD SESSION AND RISKS INCLUDING BUT NOT LIMITED TO DEATH. PATIENT ACKNOWLEDGED UNDERSTANDING.  - NEPHROLOGY CONSULT IN PROGRESS    # PANCYTOPENIA   - F/U ANEMIA PANEL  - F/U HIV AND HEPATITIS    - TRENDING HGB  - TYPE AND SCREEN    # SEVERE PROTEIN CALORIE MALNUTRITION, FAILURE TO THRIVE - NUTRITIONAL SUPPLEMENT  # HX OF ANEMIA OF CKD  # HLD  # HTN  # DM   # PARTIALLY BLIND  # S/P LEFT BKA  # HX OF PVD  # LS SPINAL STENOSIS  # GI AND DVT PPX.

## 2022-05-03 ENCOUNTER — TRANSCRIPTION ENCOUNTER (OUTPATIENT)
Age: 62
End: 2022-05-03

## 2022-05-03 VITALS — HEART RATE: 94 BPM | DIASTOLIC BLOOD PRESSURE: 80 MMHG | SYSTOLIC BLOOD PRESSURE: 134 MMHG

## 2022-05-03 LAB
ANION GAP SERPL CALC-SCNC: 7 MMOL/L — SIGNIFICANT CHANGE UP (ref 5–17)
ANION GAP SERPL CALC-SCNC: 8 MMOL/L — SIGNIFICANT CHANGE UP (ref 5–17)
BASOPHILS # BLD AUTO: 0.01 K/UL — SIGNIFICANT CHANGE UP (ref 0–0.2)
BASOPHILS NFR BLD AUTO: 0.6 % — SIGNIFICANT CHANGE UP (ref 0–2)
BUN SERPL-MCNC: 31 MG/DL — HIGH (ref 7–18)
BUN SERPL-MCNC: 76 MG/DL — HIGH (ref 7–18)
CALCIUM SERPL-MCNC: 7.4 MG/DL — LOW (ref 8.4–10.5)
CALCIUM SERPL-MCNC: 8 MG/DL — LOW (ref 8.4–10.5)
CHLORIDE SERPL-SCNC: 96 MMOL/L — SIGNIFICANT CHANGE UP (ref 96–108)
CHLORIDE SERPL-SCNC: 99 MMOL/L — SIGNIFICANT CHANGE UP (ref 96–108)
CO2 SERPL-SCNC: 26 MMOL/L — SIGNIFICANT CHANGE UP (ref 22–31)
CO2 SERPL-SCNC: 29 MMOL/L — SIGNIFICANT CHANGE UP (ref 22–31)
CREAT SERPL-MCNC: 13.7 MG/DL — HIGH (ref 0.5–1.3)
CREAT SERPL-MCNC: 5.88 MG/DL — HIGH (ref 0.5–1.3)
EGFR: 10 ML/MIN/1.73M2 — LOW
EGFR: 4 ML/MIN/1.73M2 — LOW
EOSINOPHIL # BLD AUTO: 0.14 K/UL — SIGNIFICANT CHANGE UP (ref 0–0.5)
EOSINOPHIL NFR BLD AUTO: 8.2 % — HIGH (ref 0–6)
GLUCOSE BLDC GLUCOMTR-MCNC: 148 MG/DL — HIGH (ref 70–99)
GLUCOSE BLDC GLUCOMTR-MCNC: 154 MG/DL — HIGH (ref 70–99)
GLUCOSE BLDC GLUCOMTR-MCNC: 238 MG/DL — HIGH (ref 70–99)
GLUCOSE BLDC GLUCOMTR-MCNC: 294 MG/DL — HIGH (ref 70–99)
GLUCOSE SERPL-MCNC: 231 MG/DL — HIGH (ref 70–99)
GLUCOSE SERPL-MCNC: 240 MG/DL — HIGH (ref 70–99)
HCT VFR BLD CALC: 23.2 % — LOW (ref 39–50)
HGB BLD-MCNC: 7.6 G/DL — LOW (ref 13–17)
IMM GRANULOCYTES NFR BLD AUTO: 0 % — SIGNIFICANT CHANGE UP (ref 0–1.5)
LYMPHOCYTES # BLD AUTO: 0.42 K/UL — LOW (ref 1–3.3)
LYMPHOCYTES # BLD AUTO: 24.7 % — SIGNIFICANT CHANGE UP (ref 13–44)
MCHC RBC-ENTMCNC: 29.3 PG — SIGNIFICANT CHANGE UP (ref 27–34)
MCHC RBC-ENTMCNC: 32.8 GM/DL — SIGNIFICANT CHANGE UP (ref 32–36)
MCV RBC AUTO: 89.6 FL — SIGNIFICANT CHANGE UP (ref 80–100)
MONOCYTES # BLD AUTO: 0.16 K/UL — SIGNIFICANT CHANGE UP (ref 0–0.9)
MONOCYTES NFR BLD AUTO: 9.4 % — SIGNIFICANT CHANGE UP (ref 2–14)
NEUTROPHILS # BLD AUTO: 0.97 K/UL — LOW (ref 1.8–7.4)
NEUTROPHILS NFR BLD AUTO: 57.1 % — SIGNIFICANT CHANGE UP (ref 43–77)
NRBC # BLD: 0 /100 WBCS — SIGNIFICANT CHANGE UP (ref 0–0)
PLATELET # BLD AUTO: 99 K/UL — LOW (ref 150–400)
POTASSIUM SERPL-MCNC: 3.5 MMOL/L — SIGNIFICANT CHANGE UP (ref 3.5–5.3)
POTASSIUM SERPL-MCNC: 6.8 MMOL/L — CRITICAL HIGH (ref 3.5–5.3)
POTASSIUM SERPL-SCNC: 3.5 MMOL/L — SIGNIFICANT CHANGE UP (ref 3.5–5.3)
POTASSIUM SERPL-SCNC: 6.8 MMOL/L — CRITICAL HIGH (ref 3.5–5.3)
RBC # BLD: 2.59 M/UL — LOW (ref 4.2–5.8)
RBC # FLD: 16.8 % — HIGH (ref 10.3–14.5)
SODIUM SERPL-SCNC: 130 MMOL/L — LOW (ref 135–145)
SODIUM SERPL-SCNC: 135 MMOL/L — SIGNIFICANT CHANGE UP (ref 135–145)
WBC # BLD: 1.7 K/UL — LOW (ref 3.8–10.5)
WBC # FLD AUTO: 1.7 K/UL — LOW (ref 3.8–10.5)

## 2022-05-03 RX ORDER — DIPHENHYDRAMINE HCL 50 MG
1 CAPSULE ORAL
Qty: 0 | Refills: 0 | DISCHARGE

## 2022-05-03 RX ADMIN — Medication 12.5 MILLIGRAM(S): at 17:33

## 2022-05-03 RX ADMIN — Medication 3: at 13:45

## 2022-05-03 RX ADMIN — ERYTHROPOIETIN 8000 UNIT(S): 10000 INJECTION, SOLUTION INTRAVENOUS; SUBCUTANEOUS at 11:53

## 2022-05-03 RX ADMIN — Medication 1 GRAM(S): at 17:33

## 2022-05-03 RX ADMIN — SEVELAMER CARBONATE 2400 MILLIGRAM(S): 2400 POWDER, FOR SUSPENSION ORAL at 13:47

## 2022-05-03 RX ADMIN — Medication 25 MILLIGRAM(S): at 13:52

## 2022-05-03 RX ADMIN — Medication 1 GRAM(S): at 13:46

## 2022-05-03 RX ADMIN — Medication 1 MILLIGRAM(S): at 13:46

## 2022-05-03 RX ADMIN — NORTRIPTYLINE HYDROCHLORIDE 10 MILLIGRAM(S): 10 CAPSULE ORAL at 13:47

## 2022-05-03 NOTE — DISCHARGE NOTE NURSING/CASE MANAGEMENT/SOCIAL WORK - NSDCPEFALRISK_GEN_ALL_CORE
For information on Fall & Injury Prevention, visit: [email or web address] OR  [email or web address] OR  https://www.cdc.gov/steadi/patient.html

## 2022-05-03 NOTE — PROGRESS NOTE ADULT - PROVIDER SPECIALTY LIST ADULT
Internal Medicine
Nephrology
Internal Medicine
Internal Medicine
Nephrology
Gastroenterology
Internal Medicine

## 2022-05-03 NOTE — PROGRESS NOTE ADULT - ASSESSMENT
A/P  ESRD  ON  HD  HAD  HIS  TREATMENT TODAY    CUT  TIME AGAIN    EDUCATED ON  THE IMPORTANCE OF COMPLETING HIS PRESCRIBED TIME        ADMITTED  WITH GI  BLEED,    REFUSING  W/U    ON  AYAN  WITH HD        VOL  STATUS IS  OK     FOR  NEXT HD O  TUESDAY    
Patient is a 61y Male whom presented to the hospital with vomitting blood.  Has ESRD on HD TTS at [facility]. Poor compliance with HD and renal diet.  He is a poor historian, admitted with vomitting blood  renal consulted for hyperkalemia in setting of ESRD       # ESRD, admitted with hematemesis. Has hyperkalemia. cut HD time to 1hr 45 minutes.  encourage compliance with HD   consider HD in AM if he is agreeable   lokelma 10 daily    # GIB. acute drop of HBG. Gastroenterology consult  will give epogen on HD   transfuse PRN    # HTN BP stable    # CKDMBD phos at goal 
Patient is a 61y Male whom presented to the hospital with vomitting blood.  Has ESRD on HD TTS at [facility]. Poor compliance with HD and renal diet.  He is a poor historian, admitted with vomitting blood  renal consulted for hyperkalemia in setting of ESRD       # ESRD, admitted with hematemesis. Has hyperkalemia. cut HD time yesterday  to 1hr 45 minutes.  offered to do HD today but refused  encourage compliance with HD    HD in AM   [facility] 10 daily    # GIB. acute drop of HBG.   refusing endoscopy.  will give epogen on HD   transfuse PRN    # CKDMBD phos at goal 
Patient is a 61y Male whom presented to the hospital with vomitting blood.  Has ESRD on HD TTS at [facility]. Poor compliance with HD and renal diet.  He is a poor historian, admitted with vomitting blood  renal consulted for hyperkalemia in setting of ESRD       # ESRD, admitted with hematemesis. Has hyperkalemia. cut HD time on saturday   offered to do HD today but refused  encouraged compliance with HD    HD in AM   lokelma 10 mg three times per day for now      # GIB. acute drop of HBG.   will give epogen on HD   transfuse PRN    # CKDMBD phos at goal 
A/P  ESRD ON   HD  FO R REGULAR HD ON  TUES   EDUCATED ON  THE RISKS OF  CUTTING HIS  TREATMENTS   ADV TO COMPLETE PRESCRIBED TIME    FOLLOW K     BP IS ACCEPTABLE   VOL  STATUS IS ACCEPTABLE  ADMITTED  WITH UPPER GI  BLEED     ON  AYAN  WITH HD    BEING FOLLOWED  BY   GI
Patient is a 61y Male whom presented to the hospital with vomitting blood.  Has ESRD on HD TTS at [facility]. Poor compliance with HD and renal diet.  He is a poor historian, admitted with vomitting blood  renal consulted for hyperkalemia in setting of ESRD       # ESRD, admitted with hematemesis. Has hyperkalemia s/p HD today. Only tolerated 2 hrs of HD   post HD K+ resolved.  encouraged compliance with HD    HD in AM   cont [facility]      # GIB. acute drop of HBG.   will give epogen on HD   transfuse PRN    # CKDMBD phos at goal 
Patient is a 62 yo M from [facility] with PMH of hypertension, anemia, CAD, hyperlipidemia, diabetes mellitus, GERD and adrenal insuffiencey, ESRD on HD T,Th,S reports to the ED for vomiting blood. Admitted for hematemesis.
Patient is a 62 yo M from [facility] with PMH of hypertension, anemia, CAD, hyperlipidemia, diabetes mellitus, GERD and adrenal insuffiencey, ESRD on HD T,Th,S reports to the ED for vomiting blood. Admitted for hematemesis.
Patient is a 62 yo M from [facility] with PMH of hypertension, anemia, CAD, hyperlipidemia, diabetes mellitus, GERD and adrenal insuffiencey, ESRD on HD T,Th,S reports to the ED for vomiting blood. Admitted for hematemesis.

## 2022-05-03 NOTE — PROGRESS NOTE ADULT - SUBJECTIVE AND OBJECTIVE BOX
[facility] : Progress Note :: [phone], (office [phone]),   Dr Mosher / Dr Washington / Dr Young / Dr Doll / Dr Vasrha TURCIOS / Dr Tello / Dr Garcia / Dr Larry qiu  _____________________________________________________________________________________________    s/p HD today. only had 2 hrs    fish (Rash)  liver (Anaphylaxis)  No Known Drug Allergies    Hospital Medications:   MEDICATIONS  (STANDING):  amLODIPine   Tablet 10 milliGRAM(s) Oral daily  epoetin beverley-epbx (RETACRIT) Injectable 8000 Unit(s) IV Push <User Schedule>  folic acid 1 milliGRAM(s) Oral daily  hydrALAZINE 25 milliGRAM(s) Oral three times a day  insulin lispro (ADMELOG) corrective regimen sliding scale   SubCutaneous three times a day before meals  insulin lispro (ADMELOG) corrective regimen sliding scale   SubCutaneous at bedtime  levothyroxine 25 MICROGram(s) Oral daily  metoprolol tartrate 12.5 milliGRAM(s) Oral two times a day  nortriptyline 10 milliGRAM(s) Oral daily  pantoprazole  Injectable 40 milliGRAM(s) IV Push every 12 hours  sevelamer carbonate 2400 milliGRAM(s) Oral every 8 hours  simvastatin 40 milliGRAM(s) Oral at bedtime  sodium zirconium cyclosilicate 10 Gram(s) Oral three times a day  sucralfate 1 Gram(s) Oral four times a day        VITALS:  T(F): 98.1 (05-03-22 @ 14:06), Max: 98.6 (05-02-22 @ 21:15)  HR: 94 (05-03-22 @ 17:33)  BP: 134/80 (05-03-22 @ 17:33)  RR: 17 (05-03-22 @ 14:06)  SpO2: 98% (05-03-22 @ 14:06)  Wt(kg): --      PHYSICAL EXAM:  Constitutional: NAD  HEENT: anicteric sclera, oropharynx clear.  Neck: No JVD  Respiratory: CTAB, no wheezes, rales or rhonchi  Cardiovascular: S1, S2, RRR  Gastrointestinal: BS+, soft, NT/ND  Extremities:  No peripheral edema  Neurological: A/O x 3, no focal deficits  : No CVA tenderness. No [place].   Skin: No rashes  Vascular Access: AVF with thrill and bruit    LABS:  05-03    135  |  99  |  31<H>  ----------------------------<  240<H>  3.5   |  29  |  5.88<H>    Ca    8.0<L>      03 May 2022 12:06  Phos  4.9     05-02  Mg     3.0     05-02    TPro  6.1  /  Alb  3.0<L>  /  TBili  0.3  /  DBili  0.2  /  AST  15  /  ALT  20  /  AlkPhos  69  05-02    Creatinine Trend: 5.88 <--, 13.70 <--, 12.40 <--, 9.68 <--, 8.50 <--, 12.00 <--, 10.00 <--, 11.10 <--, 8.61 <--                        7.6    1.70  )-----------( 99       ( 03 May 2022 12:06 )             23.2     Urine Studies:      RADIOLOGY & ADDITIONAL STUDIES:

## 2022-05-03 NOTE — DISCHARGE NOTE NURSING/CASE MANAGEMENT/SOCIAL WORK - PATIENT PORTAL LINK FT
You can access the FollowMyHealth Patient Portal offered by [facility] by registering at the following website: [email or web address]. By joining [employer]’s FollowMyHealth portal, you will also be able to view your health information using other applications (apps) compatible with our system.

## 2022-05-20 NOTE — ED PROVIDER NOTE - WR ORDER STATUS 1
[facility]  [address] [place], [address] [facility]  Telephone: [phone]    NAME:  Darshan Bush  YOB: 1956  DATE: 2022        Wound Cleansing:   Do not scrub or use excessive force. Cleanse wound prior to applying a clean dressing with:    [] Normal Saline   [] Keep Wound Dry in Shower      [x] Mild soap and water with dressing changes  [] May Shower at Discharge   [x] A&D ointment  Dressings:           Wound Location left foot      Apply Primary Dressin [address] dressing:   [] Daily      [x] Every Other Day   [] Three times per week  [] Once a week   [] Do Not Change Dressing     [] Other:    Off-Loading:   [x] Off-loading when [x] walking  [x] in bed [] sitting    Dietary:  [x] Diet as tolerated: [] Diabetic Diet:   [x] Increase Protein: examples ( Meat, cheese, eggs, greek yogurt, premier protein drink, fish, nuts )   [] Other:    Return Appointment:      [x] Return Appointment: With Genet Durant in 1 week        Lacy Sabillon 281: Should you experience any significant changes in your wound(s) or have questions about your wound care, please contact the [facility] Main at [address] [place] [address] 8:00 am - 4:30. If you need help with your wound outside these hours and cannot wait until we are again available, contact your PCP or go to the hospital emergency room. PLEASE NOTE: IF YOU ARE UNABLE TO OBTAIN WOUND SUPPLIES, CONTINUE TO USE THE SUPPLIES YOU HAVE AVAILABLE UNTIL YOU ARE ABLE TO REACH US. IT IS MOST IMPORTANT TO KEEP THE WOUND COVERED AT ALL TIMES.      Physician Signature:_______________________  Dr. Fransico Levine Resulted

## 2022-05-26 ENCOUNTER — OUTPATIENT (OUTPATIENT)
Dept: OUTPATIENT SERVICES | Facility: HOSPITAL | Age: 62
LOS: 1 days | End: 2022-05-26

## 2022-05-26 ENCOUNTER — APPOINTMENT (OUTPATIENT)
Dept: GASTROENTEROLOGY | Facility: CLINIC | Age: 62
End: 2022-05-26
Payer: MEDICAID

## 2022-05-26 ENCOUNTER — NON-APPOINTMENT (OUTPATIENT)
Age: 62
End: 2022-05-26

## 2022-05-26 VITALS
HEART RATE: 94 BPM | OXYGEN SATURATION: 98 % | HEIGHT: 63 IN | SYSTOLIC BLOOD PRESSURE: 138 MMHG | WEIGHT: 125 LBS | BODY MASS INDEX: 22.15 KG/M2 | TEMPERATURE: 98.1 F | DIASTOLIC BLOOD PRESSURE: 60 MMHG

## 2022-05-26 DIAGNOSIS — Z89.512 ACQUIRED ABSENCE OF LEFT LEG BELOW KNEE: Chronic | ICD-10-CM

## 2022-05-26 DIAGNOSIS — Z98.890 OTHER SPECIFIED POSTPROCEDURAL STATES: Chronic | ICD-10-CM

## 2022-05-26 DIAGNOSIS — Z87.448 PERSONAL HISTORY OF OTHER DISEASES OF URINARY SYSTEM: Chronic | ICD-10-CM

## 2022-05-26 DIAGNOSIS — Z98.41 CATARACT EXTRACTION STATUS, RIGHT EYE: Chronic | ICD-10-CM

## 2022-05-26 DIAGNOSIS — R13.10 DYSPHAGIA, UNSPECIFIED: ICD-10-CM

## 2022-05-26 DIAGNOSIS — D64.9 ANEMIA, UNSPECIFIED: ICD-10-CM

## 2022-05-26 DIAGNOSIS — Z98.42 CATARACT EXTRACTION STATUS, LEFT EYE: Chronic | ICD-10-CM

## 2022-05-26 PROCEDURE — 99205 OFFICE O/P NEW HI 60 MIN: CPT

## 2022-05-26 RX ORDER — PANTOPRAZOLE 40 MG/1
40 TABLET, DELAYED RELEASE ORAL DAILY
Qty: 28 | Refills: 6 | Status: ACTIVE | COMMUNITY
Start: 2022-05-26 | End: 1900-01-01

## 2022-05-26 RX ORDER — POLYETHYLENE GLYCOL 3350 AND ELECTROLYTES WITH LEMON FLAVOR 236; 22.74; 6.74; 5.86; 2.97 G/4L; G/4L; G/4L; G/4L; G/4L
236 POWDER, FOR SOLUTION ORAL
Qty: 1 | Refills: 0 | Status: ACTIVE | COMMUNITY
Start: 2022-05-26 | End: 1900-01-01

## 2022-05-26 NOTE — END OF VISIT
[] : Fellow [FreeTextEntry3] : 61M with HTN/CAD on ASA/ ESRD on HD TRSa recent admission at [facility] for hematemesis (no EGD there)\par with new dysphagia x 1 year, n/v\par no melena/ hematochezia/ normal BM daily\par pancytopenia\par \par Rec\par 1- barium esophagram in the interim\par 2- heme eval for panctopenia\par 3- PSTs prior to EGD/colon\par \par

## 2022-05-26 NOTE — PHYSICAL EXAM
[General Appearance - Alert] : alert [General Appearance - In No Acute Distress] : in no acute distress [Sclera] : the sclera and conjunctiva were normal [PERRL With Normal Accommodation] : pupils were equal in size, round, and reactive to light [Extraocular Movements] : extraocular movements were intact [Outer Ear] : the ears and nose were normal in appearance [Oropharynx] : the oropharynx was normal [Auscultation Breath Sounds / Voice Sounds] : lungs were clear to auscultation bilaterally [Heart Rate And Rhythm] : heart rate was normal and rhythm regular [Heart Sounds] : normal S1 and S2 [Heart Sounds Gallop] : no gallops [Murmurs] : no murmurs [Heart Sounds Pericardial Friction Rub] : no pericardial rub [Bowel Sounds] : normal bowel sounds [Abdomen Soft] : soft [Abdomen Tenderness] : non-tender [Abdomen Mass (___ Cm)] : no abdominal mass palpated [Abnormal Walk] : normal gait [Nail Clubbing] : no clubbing  or cyanosis of the fingernails [Musculoskeletal - Swelling] : no joint swelling seen [Motor Tone] : muscle strength and tone were normal [Skin Color & Pigmentation] : normal skin color and pigmentation [Skin Turgor] : normal skin turgor [] : no rash [Deep Tendon Reflexes (DTR)] : deep tendon reflexes were 2+ and symmetric [Sensation] : the sensory exam was normal to light touch and pinprick [No Focal Deficits] : no focal deficits [Oriented To Time, Place, And Person] : oriented to person, place, and time [Impaired Insight] : insight and judgment were intact [Affect] : the affect was normal

## 2022-05-26 NOTE — ASSESSMENT
[FreeTextEntry1] : #Anemia: no overt bleeding, will need GI work up although patient has history of hematuria and ESRD as alternative explanation of anemia. \par #Pancytopenia, hematologic work up needed \par \par #Esophageal dysphagia, will need EGD to rule out intraluminal mass vs stricture.  Alternative etiology includes esophageal dysmotility.\par \par #N/V: unclear etiology, will need to rule out PUD vs mass as possible etiology. Although has DM, sxs does not seem to be concerning for gastroparesis given no bloating and sxs as very sporadic. \par \par Plan:\par -PPI daily\par -barium esophagram to assess for stricture and mass\par -Plan for EGD/Colonoscopy, patient will need PST and Hem work up for pancytopenia.\par -Suspect Cards clearance is needed given CT with cardiomegaly\par \par Discussed with Dr. Martinez \par \par Sidney Barrow MD\par GI Fellow

## 2022-05-26 NOTE — HISTORY OF PRESENT ILLNESS
[Heartburn] : denies heartburn [Diarrhea] : denies diarrhea [Constipation] : denies constipation [Yellow Skin Or Eyes (Jaundice)] : denies jaundice [Abdominal Pain] : denies abdominal pain [Abdominal Swelling] : denies abdominal swelling [Rectal Pain] : denies rectal pain [Nausea] : nausea [Vomiting] : vomiting [de-identified] : 60 yo w/ CAD, ESRD on HD Tu/Th/Sat, HTN, DM, hx of bladder mass and hematuria, elevated PSA with normal MRI, recent admission for hematemesis (refused EGD and hyperkalemia tx) here for Colonoscopy.\par \par Patient reporting dysphagia for the past 4 years to solid food, has 1-2 episodes per month. Has spontaneous N/V, last episodes 2 weeks ago. No marihuana use, no specific trigger. Patient moves his bowel every day. No abd pain, bloating. \par No family history of colon cancer or any other GI malignancy.\par \par Patient has never had an EGD/Colonoscopy. Agreeable now.

## 2022-05-30 PROCEDURE — 97110 THERAPEUTIC EXERCISES: CPT

## 2022-05-30 PROCEDURE — 83735 ASSAY OF MAGNESIUM: CPT

## 2022-05-30 PROCEDURE — 82728 ASSAY OF FERRITIN: CPT

## 2022-05-30 PROCEDURE — 99285 EMERGENCY DEPT VISIT HI MDM: CPT | Mod: 25

## 2022-05-30 PROCEDURE — 36415 COLL VENOUS BLD VENIPUNCTURE: CPT

## 2022-05-30 PROCEDURE — 80048 BASIC METABOLIC PNL TOTAL CA: CPT

## 2022-05-30 PROCEDURE — 94640 AIRWAY INHALATION TREATMENT: CPT

## 2022-05-30 PROCEDURE — 82803 BLOOD GASES ANY COMBINATION: CPT

## 2022-05-30 PROCEDURE — 84484 ASSAY OF TROPONIN QUANT: CPT

## 2022-05-30 PROCEDURE — 83880 ASSAY OF NATRIURETIC PEPTIDE: CPT

## 2022-05-30 PROCEDURE — 93306 TTE W/DOPPLER COMPLETE: CPT

## 2022-05-30 PROCEDURE — 93005 ELECTROCARDIOGRAM TRACING: CPT

## 2022-05-30 PROCEDURE — 87389 HIV-1 AG W/HIV-1&-2 AB AG IA: CPT

## 2022-05-30 PROCEDURE — 87040 BLOOD CULTURE FOR BACTERIA: CPT

## 2022-05-30 PROCEDURE — 82553 CREATINE MB FRACTION: CPT

## 2022-05-30 PROCEDURE — 87340 HEPATITIS B SURFACE AG IA: CPT

## 2022-05-30 PROCEDURE — 84100 ASSAY OF PHOSPHORUS: CPT

## 2022-05-30 PROCEDURE — 82550 ASSAY OF CK (CPK): CPT

## 2022-05-30 PROCEDURE — 80076 HEPATIC FUNCTION PANEL: CPT

## 2022-05-30 PROCEDURE — 99261: CPT

## 2022-05-30 PROCEDURE — 85730 THROMBOPLASTIN TIME PARTIAL: CPT

## 2022-05-30 PROCEDURE — 87635 SARS-COV-2 COVID-19 AMP PRB: CPT

## 2022-05-30 PROCEDURE — 80074 ACUTE HEPATITIS PANEL: CPT

## 2022-05-30 PROCEDURE — 86901 BLOOD TYPING SEROLOGIC RH(D): CPT

## 2022-05-30 PROCEDURE — 87640 STAPH A DNA AMP PROBE: CPT

## 2022-05-30 PROCEDURE — 85027 COMPLETE CBC AUTOMATED: CPT

## 2022-05-30 PROCEDURE — 80053 COMPREHEN METABOLIC PANEL: CPT

## 2022-05-30 PROCEDURE — 85025 COMPLETE CBC W/AUTO DIFF WBC: CPT

## 2022-05-30 PROCEDURE — 86706 HEP B SURFACE ANTIBODY: CPT

## 2022-05-30 PROCEDURE — 96375 TX/PRO/DX INJ NEW DRUG ADDON: CPT

## 2022-05-30 PROCEDURE — 86850 RBC ANTIBODY SCREEN: CPT

## 2022-05-30 PROCEDURE — 83690 ASSAY OF LIPASE: CPT

## 2022-05-30 PROCEDURE — 97116 GAIT TRAINING THERAPY: CPT

## 2022-05-30 PROCEDURE — 84466 ASSAY OF TRANSFERRIN: CPT

## 2022-05-30 PROCEDURE — 85610 PROTHROMBIN TIME: CPT

## 2022-05-30 PROCEDURE — 71045 X-RAY EXAM CHEST 1 VIEW: CPT

## 2022-05-30 PROCEDURE — 96374 THER/PROPH/DIAG INJ IV PUSH: CPT

## 2022-05-30 PROCEDURE — 82962 GLUCOSE BLOOD TEST: CPT

## 2022-05-30 PROCEDURE — 83550 IRON BINDING TEST: CPT

## 2022-05-30 PROCEDURE — 87641 MR-STAPH DNA AMP PROBE: CPT

## 2022-05-30 PROCEDURE — 83540 ASSAY OF IRON: CPT

## 2022-05-30 PROCEDURE — 86900 BLOOD TYPING SEROLOGIC ABO: CPT

## 2022-05-30 PROCEDURE — 99285 EMERGENCY DEPT VISIT HI MDM: CPT

## 2022-06-18 NOTE — BEHAVIORAL HEALTH ASSESSMENT NOTE - GAIT / STATION
"[address] [facility] MEDICINE PROGRESS NOTE   Patient: Crissy Gutierrez  [id number]'V Date: 6/18/2022    YOB: 1956  Admission Date: 6/15/2022    MRN: [id number]  Inpatient LOS: 3    Room: [facility] Day: Hospital Day: 4    Subjective   HISTORY AND SUBJECTIVE COMPLAINTS     Chief Complaint:   Weight gain    Interval History / Subjective:   No acute overnight events. Patient with no acute complaints. Cardiology had a long discussion with patient and due to continuous admissions since April they feel that patient is a hospice candidate. We will aggressively diurese this hospital stay and discharge if possible on a high dose of oral diuretics. If this plan fails, communicated to patient that hospice would be appropriate. Patient's sisters came in from out of town to visit with her. Patient says she feels well this morning    Hospital Course: Crissy Gutierrez is a 60year old female who presented on 6/15/2022 with complaints of Medical Problem  . ROS:  Pertinent systems negative except as above.     Objective   PHYSICAL EXAMINATION     Vital 24 Hour Range Most Recent Value   Temperature Temp  Min: 97.6 Â°F (36.4 Â°C)  Max: 98 Â°F (36.7 Â°C) 97.6 Â°F (36.4 Â°C)   Pulse Pulse  Min: 64  Max: 96 96   Respiratory Resp  Min: 16  Max: 16 16   Blood Pressure BP  Min: 91/59  Max: 111/56 91/59   Pulse Oximetry SpO2  Min: 92 %  Max: 93 % 92 %   Arterial BP No data recorded     O2 No data recorded       Recorded Intake and Output:    Intake/Output Summary (Last 24 hours) at 6/18/2022 1055  Last data filed at 6/18/2022 1038  Gross per 24 hour   Intake 1060 ml   Output 725 ml   Net 335 ml      Recorded Last Stool Occurrence: 1 (06/18/22 0858)     Vital Most Recent Value First Value   Weight 85.4 kg (188 lb 4.4 oz) Weight: 89.4 kg (197 lb)   Height 5' 7"" (170.2 cm) Height: 5' 7\"" (170.2 cm)   BMI 29.49 N/A     General: Looks chronically ill no apparent distress  CV: regular rate " and rhythm  Resp: clear to auscultation bilaterally  Abd: soft, nontender and nondistended  Ext: pitting edema present bilateral lower extremities  Skin: Venous stasis changes bilateral lower extremities  Neuro: no focal deficits noted  Psych: normal judgement and insight and oriented to time, place and person    TEST RESULTS     Labs: The Laboratory values listed below have been reviewed and pertinent findings discussed in the Assessment and Plan.     Laboratory values:   Recent Labs   Lab 06/18/22  0521 06/17/22  0603 06/16/22  0544   WBC 4.9 4.8 5.0   HGB 7.3* 7.4* 7.7*   HCT 23.0* 22.7* 24.2*   * 128* 135*       Recent Labs   Lab 06/18/22  0950 06/18/22  0521 06/17/22  0603 06/16/22  0544   SODIUM  --  133* 132* 133*   POTASSIUM 3.8 3.3* 3.5 3.9   CHLORIDE  --  96* 95* 97   CO2  --  25 28 24   CALCIUM  --  8.3* 8.2* 8.5   GLUCOSE  --  84 82 79   BUN  --  68* 64* 65*   CREATININE  --  1.63* 1.75* 1.85*   MG  --   --   --  1.9        Recent Labs   Lab 06/15/22  0835   ALBUMIN 3.1*   AST 26   GPT 13   BILIRUBIN 1.3*     No results found  No results available in last 24 hours      Recent Labs   Lab 06/15/22  0835   NTPROB 12,052*       Lab Results   Component Value Date    VB12 728 02/10/2022    GARY 7.6 02/10/2022    VITD25 29.3 (L) 08/29/2019    VITD25 <20  ng/mL=Vitamin D deficiency 08/29/2019    VITD25 20-29  ng/mL=Vitamin D insufficiency 08/29/2019    VITD25  ng/mL=Optimal Vitamin D 08/29/2019    VITD25 >150 ng/mL=Possible toxicity 08/29/2019    TSH 4.520 04/26/2022    HGBA1C 4.7 04/23/2022        Lab Results   Component Value Date    CHOLESTEROL 153 07/27/2016    HDL 41 07/27/2016    CALCLDL 74 07/27/2016        Lab Results   Component Value Date    UOSM 386 04/23/2022    MONICA 20 04/23/2022    USPG 1.012 05/26/2022    UPROT Negative 05/26/2022    UWBC Negative 05/26/2022    URBC Negative 05/26/2022    5UNITR NEGATIVE 12/02/2019    UPH 5.0 05/26/2022    UBACTRA None Seen 05/16/2022       No results found      Radiology: Imaging studies have been reviewed and pertinent findings discussed in the Assessment and Plan. No results found for any visits on 06/15/22 (from the past 48 hour(s)). ANCILLARY ORDERS     Diet:  Cardiac, Fluid Restrict 1800ml (1020 From Dietary) Diet  One Time Diet Regular; Orange Juice, Blueberry Pancakes, Sausage Samantha, Fat-free Vanilla Yogurt, Oatmeal With [place], 2 [place], Syrup  Telemetry: Off  Consults:    IP CONSULT TO CARDIOLOGY  IP CONSULT TO NEPHROLOGY  IP CONSULT TO CARDIAC REHAB  IP CONSULT TO PALLIATIVE CARE  Therapy Orders:   PT and OT Orders Placed this Encounter   Procedures   â¢ Occupational Therapy   â¢ Physical Therapy       ADVANCED DIRECTIVES     Code Status: Selective Treatment/DNR         ASSESSMENT AND PLAN     1. Acute CHF exacerbation with preserved ejection fraction   -cardiology consult   -continue Lasix drip  2. ANAHY on CKD stage 3   -nephrology consult   -balance diuresis with optimizing volume status  3. Acute on chronic anemia   -likely secondary to renal disease   -monitor CBC   -transfuse for hemoglobin below 7  4. Anxiety and depression   -continue home meds   -Ativan as needed   5. Hyperlipidemia  -statin therapy  6. CAD   -continue home meds  7. Essential hypertension   -continue home meds  -hydralazine as needed  8. GERD   -ppi therapy   9. FEN/prophylaxis/disposition  -no IV fluids   -electrolyte replacement as needed  -SCDs, heparin   -cardiac fluid-restricted diet   -monitor on floor today     Smoking status: non smoker    Nutrition status: appropriate  Body mass index is 29.49 kg/mÂ². - Obese (BMI 30-39 without a comorbid condition or 30-34 with a comorbid condition)  DVT Prophylaxis: Heparin 5000 units sub q tid         DISCHARGE PLANNING     The patient's treatment plans were discussed with patient.      Recommendations for Discharge   SW Other (comment) (TBD)   PT Post-acute facility with therapy needs   OT Post-acute facility with therapy needs (Return to [facility] for rehab)   SLP        Anticipated discharge destination: Home  Expected Discharge Date:   To be determined  Barriers to Discharge: Patient is not medically ready and needs to remain in the hospital today due to Continued medical treatment        Benoit Holman MD  Hospitalist  6/18/2022  10:55 AM Other

## 2022-08-01 ENCOUNTER — OUTPATIENT (OUTPATIENT)
Dept: OUTPATIENT SERVICES | Facility: HOSPITAL | Age: 62
LOS: 1 days | End: 2022-08-01

## 2022-08-01 VITALS
RESPIRATION RATE: 16 BRPM | OXYGEN SATURATION: 97 % | WEIGHT: 126.1 LBS | SYSTOLIC BLOOD PRESSURE: 138 MMHG | TEMPERATURE: 98 F | HEIGHT: 66 IN | HEART RATE: 84 BPM | DIASTOLIC BLOOD PRESSURE: 78 MMHG

## 2022-08-01 DIAGNOSIS — E11.9 TYPE 2 DIABETES MELLITUS WITHOUT COMPLICATIONS: ICD-10-CM

## 2022-08-01 DIAGNOSIS — Z98.42 CATARACT EXTRACTION STATUS, LEFT EYE: Chronic | ICD-10-CM

## 2022-08-01 DIAGNOSIS — Z98.890 OTHER SPECIFIED POSTPROCEDURAL STATES: Chronic | ICD-10-CM

## 2022-08-01 DIAGNOSIS — Z89.512 ACQUIRED ABSENCE OF LEFT LEG BELOW KNEE: Chronic | ICD-10-CM

## 2022-08-01 DIAGNOSIS — I10 ESSENTIAL (PRIMARY) HYPERTENSION: ICD-10-CM

## 2022-08-01 DIAGNOSIS — E03.9 HYPOTHYROIDISM, UNSPECIFIED: ICD-10-CM

## 2022-08-01 DIAGNOSIS — Z98.41 CATARACT EXTRACTION STATUS, RIGHT EYE: Chronic | ICD-10-CM

## 2022-08-01 DIAGNOSIS — D64.9 ANEMIA, UNSPECIFIED: ICD-10-CM

## 2022-08-01 DIAGNOSIS — Z01.812 ENCOUNTER FOR PREPROCEDURAL LABORATORY EXAMINATION: ICD-10-CM

## 2022-08-01 DIAGNOSIS — Z87.448 PERSONAL HISTORY OF OTHER DISEASES OF URINARY SYSTEM: Chronic | ICD-10-CM

## 2022-08-01 DIAGNOSIS — N18.6 END STAGE RENAL DISEASE: ICD-10-CM

## 2022-08-01 LAB
A1C WITH ESTIMATED AVERAGE GLUCOSE RESULT: 6.2 % — HIGH (ref 4–5.6)
ANION GAP SERPL CALC-SCNC: 18 MMOL/L — HIGH (ref 7–14)
BUN SERPL-MCNC: 31 MG/DL — HIGH (ref 7–23)
CALCIUM SERPL-MCNC: 8 MG/DL — LOW (ref 8.4–10.5)
CHLORIDE SERPL-SCNC: 91 MMOL/L — LOW (ref 98–107)
CO2 SERPL-SCNC: 28 MMOL/L — SIGNIFICANT CHANGE UP (ref 22–31)
CREAT SERPL-MCNC: 9.26 MG/DL — HIGH (ref 0.5–1.3)
EGFR: 6 ML/MIN/1.73M2 — LOW
ESTIMATED AVERAGE GLUCOSE: 131 — SIGNIFICANT CHANGE UP
GLUCOSE SERPL-MCNC: 172 MG/DL — HIGH (ref 70–99)
HCT VFR BLD CALC: 34.3 % — LOW (ref 39–50)
HGB BLD-MCNC: 10.3 G/DL — LOW (ref 13–17)
MCHC RBC-ENTMCNC: 28.5 PG — SIGNIFICANT CHANGE UP (ref 27–34)
MCHC RBC-ENTMCNC: 30 GM/DL — LOW (ref 32–36)
MCV RBC AUTO: 95 FL — SIGNIFICANT CHANGE UP (ref 80–100)
NRBC # BLD: 0 /100 WBCS — SIGNIFICANT CHANGE UP
NRBC # FLD: 0 K/UL — SIGNIFICANT CHANGE UP
PLATELET # BLD AUTO: 71 K/UL — LOW (ref 150–400)
POTASSIUM SERPL-MCNC: 4.9 MMOL/L — SIGNIFICANT CHANGE UP (ref 3.5–5.3)
POTASSIUM SERPL-SCNC: 4.9 MMOL/L — SIGNIFICANT CHANGE UP (ref 3.5–5.3)
RBC # BLD: 3.61 M/UL — LOW (ref 4.2–5.8)
RBC # FLD: 18.9 % — HIGH (ref 10.3–14.5)
SODIUM SERPL-SCNC: 137 MMOL/L — SIGNIFICANT CHANGE UP (ref 135–145)
WBC # BLD: 2.56 K/UL — LOW (ref 3.8–10.5)
WBC # FLD AUTO: 2.56 K/UL — LOW (ref 3.8–10.5)

## 2022-08-01 PROCEDURE — 93010 ELECTROCARDIOGRAM REPORT: CPT

## 2022-08-01 RX ORDER — OXYCODONE HYDROCHLORIDE 5 MG/1
1 TABLET ORAL
Qty: 0 | Refills: 0 | DISCHARGE

## 2022-08-01 RX ORDER — SIMVASTATIN 20 MG/1
1 TABLET, FILM COATED ORAL
Qty: 0 | Refills: 0 | DISCHARGE

## 2022-08-01 RX ORDER — ZOLPIDEM TARTRATE 10 MG/1
1 TABLET ORAL
Qty: 0 | Refills: 0 | DISCHARGE

## 2022-08-01 RX ORDER — ASPIRIN/CALCIUM CARB/MAGNESIUM 324 MG
1 TABLET ORAL
Qty: 0 | Refills: 0 | DISCHARGE

## 2022-08-01 RX ORDER — ERGOCALCIFEROL 1.25 MG/1
1 CAPSULE ORAL
Qty: 0 | Refills: 0 | DISCHARGE

## 2022-08-01 RX ORDER — SODIUM ZIRCONIUM CYCLOSILICATE 10 G/10G
1 POWDER, FOR SUSPENSION ORAL
Qty: 0 | Refills: 0 | DISCHARGE

## 2022-08-01 RX ORDER — SEVELAMER CARBONATE 2400 MG/1
1 POWDER, FOR SUSPENSION ORAL
Qty: 0 | Refills: 0 | DISCHARGE

## 2022-08-01 RX ORDER — NORTRIPTYLINE HYDROCHLORIDE 10 MG/1
1 CAPSULE ORAL
Qty: 0 | Refills: 0 | DISCHARGE

## 2022-08-01 RX ORDER — METOCLOPRAMIDE HCL 10 MG
1 TABLET ORAL
Qty: 0 | Refills: 0 | DISCHARGE

## 2022-08-01 RX ORDER — IPRATROPIUM/ALBUTEROL SULFATE 18-103MCG
3 AEROSOL WITH ADAPTER (GRAM) INHALATION
Qty: 0 | Refills: 0 | DISCHARGE

## 2022-08-01 RX ORDER — METOPROLOL TARTRATE 50 MG
1 TABLET ORAL
Qty: 0 | Refills: 0 | DISCHARGE

## 2022-08-01 RX ORDER — FERROUS SULFATE 325(65) MG
1 TABLET ORAL
Qty: 0 | Refills: 0 | DISCHARGE

## 2022-08-01 RX ORDER — LEVOTHYROXINE SODIUM 125 MCG
1 TABLET ORAL
Qty: 0 | Refills: 0 | DISCHARGE

## 2022-08-01 RX ORDER — INSULIN DETEMIR 100/ML (3)
4 INSULIN PEN (ML) SUBCUTANEOUS
Qty: 0 | Refills: 0 | DISCHARGE

## 2022-08-01 RX ORDER — AMLODIPINE BESYLATE 2.5 MG/1
1 TABLET ORAL
Qty: 0 | Refills: 0 | DISCHARGE

## 2022-08-01 RX ORDER — FAMOTIDINE 10 MG/ML
1 INJECTION INTRAVENOUS
Qty: 0 | Refills: 0 | DISCHARGE

## 2022-08-01 NOTE — H&P PST ADULT - MUSCULOSKELETAL COMMENTS
contracture of right hands; OA, Osteoporosis, Left lower leg prosthesis Left lower leg prosthesis, h/o left BKA

## 2022-08-01 NOTE — H&P PST ADULT - NSICDXPASTMEDICALHX_GEN_ALL_CORE_FT
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    Coronary artery disease     Depression     Diabetes mellitus     Diabetic neuropathy     ESRD on hemodialysis     Glaucoma     H/O hematuria     HLD (hyperlipidemia)     Hyperparathyroidism     Hypertension     Osteoarthritis     Osteomyelitis of vertebra     Osteoporosis     Peripheral vascular disease     Spinal stenosis of lumbosacral region     UTI (urinary tract infection) hx of    Vision loss of left eye     Vision loss of right eye      PAST MEDICAL HISTORY:  Adrenal insufficiency h/o    Anemia     Bladder mass hx of    BPH (benign prostatic hyperplasia)     Cataract both eyes - hx of sx done    Chronic GERD     Contracture of hand fingers of right and left hand    Coronary artery disease     Depression     Diabetes mellitus     Diabetic neuropathy     ESRD on hemodialysis     Glaucoma     H/O hematuria     HLD (hyperlipidemia)     Hyperparathyroidism     Hypertension     Osteoarthritis     Osteomyelitis of vertebra     Osteoporosis     Peripheral vascular disease     Spinal stenosis of lumbosacral region     UTI (urinary tract infection) hx of    Vision loss of left eye     Vision loss of right eye      PAST MEDICAL HISTORY:  Adrenal insufficiency h/o    Anemia     Bladder mass hx of    BPH (benign prostatic hyperplasia)     Cataract both eyes - hx of sx done    Chronic GERD     Contracture of hand fingers of right and left hand    Coronary artery disease     Depression     Diabetes mellitus     Diabetic neuropathy     ESRD on hemodialysis     Glaucoma     H/O hematuria     HLD (hyperlipidemia)     Hyperparathyroidism     Hypertension     Osteoarthritis     Osteomyelitis of vertebra     Osteoporosis     Peripheral vascular disease     Spinal stenosis of lumbosacral region     UTI (urinary tract infection) hx of    Vision loss of left eye blind    Vision loss of right eye decreased

## 2022-08-01 NOTE — H&P PST ADULT - PROBLEM SELECTOR PLAN 2
Pt instructed to take norvasc, hydralazine, metoprolol on morning of surgery.  copy of recent stress & echo in chart.

## 2022-08-01 NOTE — H&P PST ADULT - PROBLEM SELECTOR PLAN 1
Scheduled for endoscopy/colonoscopy on 8/3/22  Written & verbal preop instructions, gi prophylaxis - pt to take own.  Pt verbalized good understanding.   Written instructions send t to facility.

## 2022-08-01 NOTE — H&P PST ADULT - GENITOURINARY COMMENTS
ESRD on Hemodialysis 3 times a week on Tuesdays, Thursdays and Saturdays via right arm AV fistula; h/o TURBT due to bladder mass in 2020, BPH, ESRD on Hemodialysis 3 times a week on Tuesdays, Thursdays and Saturdays via right arm AV fistula, [facility]

## 2022-08-01 NOTE — H&P PST ADULT - PROBLEM SELECTOR PLAN 5
Pt instructed to take synthroid on morning of surgery. Pt instructed to take synthroid on morning of surgery.  RASHIDA precautions recommended.

## 2022-08-01 NOTE — H&P PST ADULT - HISTORY OF PRESENT ILLNESS
60 yr old male with PMH of HTN, CAD, MI, DM, diabetic retinopathy, BKA with left prosthetic leg, legally blind, depression, GERD, glaucoma, osteoporosis, OA, contractures of hands and fingers, LS spinal stenosis, PVD, hyperparathyroidism, ESRD on Hemodialysis on Tuesdays, Thursdays and Saturdays via right arm AV fistula with c/o right arm mass. Pt reports trauma to area and discomfort with pressure or palpation. Pt is scheduled for excision of right arm mass on 05/05/2021.   61 yr old male with PMH of HTN, CAD, MI, DM, diabetic retinopathy, BKA with left prosthetic leg, legally blind, depression, GERD, glaucoma, osteoporosis, OA, contractures of hands and fingers, LS spinal stenosis, PVD, hyperparathyroidism, ESRD on Hemodialysis on Tuesdays, Thursdays and Saturdays via right arm AV fistula.  Pt from [facility] present  for preop eval for scheduled endoscopy/colonoscopy -anesthesia.  Pt with h/o anemia.

## 2022-08-02 NOTE — ED ADULT TRIAGE NOTE - BSA (M2)
1.71 Staging Info: By selecting yes to the question above you will include information on AJCC 8 tumor staging in your Mohs note. Information on tumor staging will be automatically added for SCCs on the head and neck. AJCC 8 includes tumor size, tumor depth, perineural involvement and bone invasion.

## 2022-08-03 ENCOUNTER — OUTPATIENT (OUTPATIENT)
Dept: OUTPATIENT SERVICES | Facility: HOSPITAL | Age: 62
LOS: 1 days | Discharge: HOME | End: 2022-08-03

## 2022-08-03 VITALS
RESPIRATION RATE: 16 BRPM | DIASTOLIC BLOOD PRESSURE: 80 MMHG | OXYGEN SATURATION: 94 % | WEIGHT: 126.1 LBS | SYSTOLIC BLOOD PRESSURE: 137 MMHG | TEMPERATURE: 98 F | HEIGHT: 66 IN | HEART RATE: 85 BPM

## 2022-08-03 VITALS
OXYGEN SATURATION: 93 % | HEART RATE: 85 BPM | DIASTOLIC BLOOD PRESSURE: 78 MMHG | RESPIRATION RATE: 15 BRPM | SYSTOLIC BLOOD PRESSURE: 149 MMHG

## 2022-08-03 DIAGNOSIS — Z98.890 OTHER SPECIFIED POSTPROCEDURAL STATES: Chronic | ICD-10-CM

## 2022-08-03 DIAGNOSIS — D64.9 ANEMIA, UNSPECIFIED: ICD-10-CM

## 2022-08-03 DIAGNOSIS — Z98.41 CATARACT EXTRACTION STATUS, RIGHT EYE: Chronic | ICD-10-CM

## 2022-08-03 DIAGNOSIS — Z87.448 PERSONAL HISTORY OF OTHER DISEASES OF URINARY SYSTEM: Chronic | ICD-10-CM

## 2022-08-03 DIAGNOSIS — Z89.512 ACQUIRED ABSENCE OF LEFT LEG BELOW KNEE: Chronic | ICD-10-CM

## 2022-08-03 DIAGNOSIS — Z98.42 CATARACT EXTRACTION STATUS, LEFT EYE: Chronic | ICD-10-CM

## 2022-08-03 LAB
BASE EXCESS BLDV CALC-SCNC: 9.8 MMOL/L — HIGH (ref -2–3)
CA-I SERPL-SCNC: 1 MMOL/L — LOW (ref 1.15–1.33)
CHLORIDE BLDV-SCNC: SIGNIFICANT CHANGE UP MMOL/L (ref 96–108)
CO2 BLDV-SCNC: 37.4 MMOL/L — HIGH (ref 22–26)
GAS PNL BLDV: 135 MMOL/L — LOW (ref 136–145)
GAS PNL BLDV: SIGNIFICANT CHANGE UP
GAS PNL BLDV: SIGNIFICANT CHANGE UP
GLUCOSE BLDC GLUCOMTR-MCNC: 197 MG/DL — HIGH (ref 70–99)
GLUCOSE BLDV-MCNC: 210 MG/DL — HIGH (ref 70–99)
HCO3 BLDV-SCNC: 36 MMOL/L — HIGH (ref 22–29)
HCT VFR BLDA CALC: 28 % — LOW (ref 39–51)
HGB BLD CALC-MCNC: 9.4 G/DL — LOW (ref 13–17)
LACTATE BLDV-MCNC: 0.9 MMOL/L — SIGNIFICANT CHANGE UP (ref 0.5–2)
PCO2 BLDV: 55 MMHG — SIGNIFICANT CHANGE UP (ref 42–55)
PH BLDV: 7.42 — SIGNIFICANT CHANGE UP (ref 7.32–7.43)
PO2 BLDV: 55 MMHG — SIGNIFICANT CHANGE UP
POTASSIUM BLDV-SCNC: 4.8 MMOL/L — SIGNIFICANT CHANGE UP (ref 3.5–5.1)
SAO2 % BLDV: 87.6 % — SIGNIFICANT CHANGE UP

## 2022-08-03 PROCEDURE — 45378 DIAGNOSTIC COLONOSCOPY: CPT | Mod: GC

## 2022-08-03 PROCEDURE — 43239 EGD BIOPSY SINGLE/MULTIPLE: CPT | Mod: GC

## 2022-08-03 NOTE — ASU PATIENT PROFILE, ADULT - FALL HARM RISK - HARM RISK INTERVENTIONS

## 2022-08-03 NOTE — PACU DISCHARGE NOTE - COMMENTS
T(C): --  HR: --  BP: --  RR: --  SpO2: --    Vital signs stable, non-labored breathing with adequate oxygen saturation on room air. Pain and nausea are controlled. All questions answered. Stable for discharge home with an escort.
T(C): 36.9 (08-03-22 @ 16:40), Max: 36.9 (08-03-22 @ 16:40)  HR: 85 (08-03-22 @ 17:10) (79 - 85)  BP: 149/78 (08-03-22 @ 17:10) (128/74 - 149/78)  RR: 15 (08-03-22 @ 17:10) (10 - 16)  SpO2: 93% (08-03-22 @ 17:10) (93% - 94%)    Vital signs stable, non-labored breathing with adequate oxygen saturation on room air. Pain and nausea are controlled. All questions answered. Stable for discharge from PACU to assisted living facility with his health aide.

## 2022-08-03 NOTE — ASU PATIENT PROFILE, ADULT - NSICDXPASTMEDICALHX_GEN_ALL_CORE_FT
PAST MEDICAL HISTORY:  Adrenal insufficiency h/o    Anemia     Bladder mass hx of    BPH (benign prostatic hyperplasia)     Cataract both eyes - hx of sx done    Chronic GERD     Contracture of hand fingers of right and left hand    Coronary artery disease     Depression     Diabetes mellitus     Diabetic neuropathy     ESRD on hemodialysis     Glaucoma     H/O hematuria     HLD (hyperlipidemia)     Hyperparathyroidism     Hypertension     Osteoarthritis     Osteomyelitis of vertebra     Osteoporosis     Peripheral vascular disease     Spinal stenosis of lumbosacral region     UTI (urinary tract infection) hx of    Vision loss of left eye blind    Vision loss of right eye decreased

## 2022-08-08 NOTE — ED ADULT NURSE NOTE - NS ED NURSE LEVEL OF CONSCIOUSNESS MENTAL STATUS
cholangitis s/p ERCP  Requesting lap luis Cholangitis Hx of PE/ Atelectasis CBD obstruction Sleepy Abdominal pain

## 2022-08-15 ENCOUNTER — APPOINTMENT (OUTPATIENT)
Dept: VASCULAR SURGERY | Facility: CLINIC | Age: 62
End: 2022-08-15

## 2022-08-16 PROBLEM — E27.40 UNSPECIFIED ADRENOCORTICAL INSUFFICIENCY: Chronic | Status: ACTIVE | Noted: 2018-01-18

## 2022-08-16 PROBLEM — H54.62 UNQUALIFIED VISUAL LOSS, LEFT EYE, NORMAL VISION RIGHT EYE: Chronic | Status: ACTIVE | Noted: 2018-10-25

## 2022-08-16 PROBLEM — H54.61 UNQUALIFIED VISUAL LOSS, RIGHT EYE, NORMAL VISION LEFT EYE: Chronic | Status: ACTIVE | Noted: 2021-03-22

## 2022-09-13 NOTE — DISCHARGE NOTE NURSING/CASE MANAGEMENT/SOCIAL WORK - NSTRANSFERPROSTHESIS_GEN_A_NUR
left/lower Drysol Counseling:  I discussed with the patient the risks of drysol/aluminum chloride including but not limited to skin rash, itching, irritation, burning.

## 2022-10-03 ENCOUNTER — INPATIENT (INPATIENT)
Facility: HOSPITAL | Age: 62
LOS: 1 days | Discharge: OTHER INSTITUTION | DRG: 377 | End: 2022-10-05
Attending: INTERNAL MEDICINE | Admitting: INTERNAL MEDICINE
Payer: MEDICAID

## 2022-10-03 VITALS
WEIGHT: 117.07 LBS | HEIGHT: 66 IN | RESPIRATION RATE: 18 BRPM | SYSTOLIC BLOOD PRESSURE: 188 MMHG | OXYGEN SATURATION: 95 % | HEART RATE: 102 BPM | DIASTOLIC BLOOD PRESSURE: 101 MMHG

## 2022-10-03 DIAGNOSIS — Z98.42 CATARACT EXTRACTION STATUS, LEFT EYE: Chronic | ICD-10-CM

## 2022-10-03 DIAGNOSIS — E78.5 HYPERLIPIDEMIA, UNSPECIFIED: ICD-10-CM

## 2022-10-03 DIAGNOSIS — E11.9 TYPE 2 DIABETES MELLITUS WITHOUT COMPLICATIONS: ICD-10-CM

## 2022-10-03 DIAGNOSIS — K92.0 HEMATEMESIS: ICD-10-CM

## 2022-10-03 DIAGNOSIS — Z98.890 OTHER SPECIFIED POSTPROCEDURAL STATES: Chronic | ICD-10-CM

## 2022-10-03 DIAGNOSIS — E03.9 HYPOTHYROIDISM, UNSPECIFIED: ICD-10-CM

## 2022-10-03 DIAGNOSIS — Z87.448 PERSONAL HISTORY OF OTHER DISEASES OF URINARY SYSTEM: Chronic | ICD-10-CM

## 2022-10-03 DIAGNOSIS — Z29.9 ENCOUNTER FOR PROPHYLACTIC MEASURES, UNSPECIFIED: ICD-10-CM

## 2022-10-03 DIAGNOSIS — E87.5 HYPERKALEMIA: ICD-10-CM

## 2022-10-03 DIAGNOSIS — I10 ESSENTIAL (PRIMARY) HYPERTENSION: ICD-10-CM

## 2022-10-03 DIAGNOSIS — E21.3 HYPERPARATHYROIDISM, UNSPECIFIED: ICD-10-CM

## 2022-10-03 DIAGNOSIS — Z89.512 ACQUIRED ABSENCE OF LEFT LEG BELOW KNEE: Chronic | ICD-10-CM

## 2022-10-03 DIAGNOSIS — N18.6 END STAGE RENAL DISEASE: ICD-10-CM

## 2022-10-03 DIAGNOSIS — Z98.41 CATARACT EXTRACTION STATUS, RIGHT EYE: Chronic | ICD-10-CM

## 2022-10-03 LAB
ALBUMIN SERPL ELPH-MCNC: 3.2 G/DL — LOW (ref 3.5–5)
ALP SERPL-CCNC: 76 U/L — SIGNIFICANT CHANGE UP (ref 40–120)
ALT FLD-CCNC: 16 U/L DA — SIGNIFICANT CHANGE UP (ref 10–60)
ANION GAP SERPL CALC-SCNC: 8 MMOL/L — SIGNIFICANT CHANGE UP (ref 5–17)
APTT BLD: 29.7 SEC — SIGNIFICANT CHANGE UP (ref 27.5–35.5)
AST SERPL-CCNC: 24 U/L — SIGNIFICANT CHANGE UP (ref 10–40)
BASOPHILS # BLD AUTO: 0.01 K/UL — SIGNIFICANT CHANGE UP (ref 0–0.2)
BASOPHILS NFR BLD AUTO: 0.3 % — SIGNIFICANT CHANGE UP (ref 0–2)
BILIRUB SERPL-MCNC: 0.7 MG/DL — SIGNIFICANT CHANGE UP (ref 0.2–1.2)
BUN SERPL-MCNC: 49 MG/DL — HIGH (ref 7–18)
CALCIUM SERPL-MCNC: 8.5 MG/DL — SIGNIFICANT CHANGE UP (ref 8.4–10.5)
CHLORIDE SERPL-SCNC: 98 MMOL/L — SIGNIFICANT CHANGE UP (ref 96–108)
CO2 SERPL-SCNC: 31 MMOL/L — SIGNIFICANT CHANGE UP (ref 22–31)
CREAT SERPL-MCNC: 12 MG/DL — HIGH (ref 0.5–1.3)
EGFR: 4 ML/MIN/1.73M2 — LOW
EOSINOPHIL # BLD AUTO: 0.08 K/UL — SIGNIFICANT CHANGE UP (ref 0–0.5)
EOSINOPHIL NFR BLD AUTO: 2.5 % — SIGNIFICANT CHANGE UP (ref 0–6)
GLUCOSE SERPL-MCNC: 85 MG/DL — SIGNIFICANT CHANGE UP (ref 70–99)
HCT VFR BLD CALC: 29.1 % — LOW (ref 39–50)
HGB BLD-MCNC: 9 G/DL — LOW (ref 13–17)
IMM GRANULOCYTES NFR BLD AUTO: 0.3 % — SIGNIFICANT CHANGE UP (ref 0–0.9)
INR BLD: 0.99 RATIO — SIGNIFICANT CHANGE UP (ref 0.88–1.16)
LYMPHOCYTES # BLD AUTO: 0.38 K/UL — LOW (ref 1–3.3)
LYMPHOCYTES # BLD AUTO: 12.1 % — LOW (ref 13–44)
MAGNESIUM SERPL-MCNC: 3 MG/DL — HIGH (ref 1.6–2.6)
MCHC RBC-ENTMCNC: 30.3 PG — SIGNIFICANT CHANGE UP (ref 27–34)
MCHC RBC-ENTMCNC: 30.9 GM/DL — LOW (ref 32–36)
MCV RBC AUTO: 98 FL — SIGNIFICANT CHANGE UP (ref 80–100)
MONOCYTES # BLD AUTO: 0.14 K/UL — SIGNIFICANT CHANGE UP (ref 0–0.9)
MONOCYTES NFR BLD AUTO: 4.5 % — SIGNIFICANT CHANGE UP (ref 2–14)
NEUTROPHILS # BLD AUTO: 2.52 K/UL — SIGNIFICANT CHANGE UP (ref 1.8–7.4)
NEUTROPHILS NFR BLD AUTO: 80.3 % — HIGH (ref 43–77)
NRBC # BLD: 0 /100 WBCS — SIGNIFICANT CHANGE UP (ref 0–0)
PLATELET # BLD AUTO: 73 K/UL — LOW (ref 150–400)
POTASSIUM SERPL-MCNC: 6 MMOL/L — HIGH (ref 3.5–5.3)
POTASSIUM SERPL-SCNC: 6 MMOL/L — HIGH (ref 3.5–5.3)
PROT SERPL-MCNC: 6.7 G/DL — SIGNIFICANT CHANGE UP (ref 6–8.3)
PROTHROM AB SERPL-ACNC: 11.8 SEC — SIGNIFICANT CHANGE UP (ref 10.5–13.4)
RBC # BLD: 2.97 M/UL — LOW (ref 4.2–5.8)
RBC # FLD: 19.5 % — HIGH (ref 10.3–14.5)
SARS-COV-2 RNA SPEC QL NAA+PROBE: SIGNIFICANT CHANGE UP
SODIUM SERPL-SCNC: 137 MMOL/L — SIGNIFICANT CHANGE UP (ref 135–145)
WBC # BLD: 3.14 K/UL — LOW (ref 3.8–10.5)
WBC # FLD AUTO: 3.14 K/UL — LOW (ref 3.8–10.5)

## 2022-10-03 PROCEDURE — 99285 EMERGENCY DEPT VISIT HI MDM: CPT

## 2022-10-03 PROCEDURE — 71045 X-RAY EXAM CHEST 1 VIEW: CPT | Mod: 26

## 2022-10-03 RX ORDER — BUDESONIDE AND FORMOTEROL FUMARATE DIHYDRATE 160; 4.5 UG/1; UG/1
2 AEROSOL RESPIRATORY (INHALATION)
Refills: 0 | Status: DISCONTINUED | OUTPATIENT
Start: 2022-10-03 | End: 2022-10-05

## 2022-10-03 RX ORDER — FOLIC ACID 0.8 MG
1 TABLET ORAL DAILY
Refills: 0 | Status: DISCONTINUED | OUTPATIENT
Start: 2022-10-03 | End: 2022-10-05

## 2022-10-03 RX ORDER — LANOLIN ALCOHOL/MO/W.PET/CERES
3 CREAM (GRAM) TOPICAL AT BEDTIME
Refills: 0 | Status: DISCONTINUED | OUTPATIENT
Start: 2022-10-03 | End: 2022-10-05

## 2022-10-03 RX ORDER — METOPROLOL TARTRATE 50 MG
25 TABLET ORAL DAILY
Refills: 0 | Status: DISCONTINUED | OUTPATIENT
Start: 2022-10-03 | End: 2022-10-05

## 2022-10-03 RX ORDER — LIDOCAINE 4 G/100G
1 CREAM TOPICAL DAILY
Refills: 0 | Status: DISCONTINUED | OUTPATIENT
Start: 2022-10-03 | End: 2022-10-05

## 2022-10-03 RX ORDER — CALCIUM CARBONATE 500(1250)
1 TABLET ORAL
Refills: 0 | Status: DISCONTINUED | OUTPATIENT
Start: 2022-10-03 | End: 2022-10-05

## 2022-10-03 RX ORDER — CALCIUM GLUCONATE 100 MG/ML
1 VIAL (ML) INTRAVENOUS ONCE
Refills: 0 | Status: COMPLETED | OUTPATIENT
Start: 2022-10-03 | End: 2022-10-03

## 2022-10-03 RX ORDER — ONDANSETRON 8 MG/1
4 TABLET, FILM COATED ORAL ONCE
Refills: 0 | Status: COMPLETED | OUTPATIENT
Start: 2022-10-03 | End: 2022-10-03

## 2022-10-03 RX ORDER — PANTOPRAZOLE SODIUM 20 MG/1
40 TABLET, DELAYED RELEASE ORAL EVERY 24 HOURS
Refills: 0 | Status: DISCONTINUED | OUTPATIENT
Start: 2022-10-03 | End: 2022-10-04

## 2022-10-03 RX ORDER — ONDANSETRON 8 MG/1
4 TABLET, FILM COATED ORAL EVERY 8 HOURS
Refills: 0 | Status: DISCONTINUED | OUTPATIENT
Start: 2022-10-03 | End: 2022-10-05

## 2022-10-03 RX ORDER — LEVOTHYROXINE SODIUM 125 MCG
25 TABLET ORAL DAILY
Refills: 0 | Status: DISCONTINUED | OUTPATIENT
Start: 2022-10-03 | End: 2022-10-05

## 2022-10-03 RX ORDER — AMLODIPINE BESYLATE 2.5 MG/1
10 TABLET ORAL DAILY
Refills: 0 | Status: DISCONTINUED | OUTPATIENT
Start: 2022-10-03 | End: 2022-10-05

## 2022-10-03 RX ORDER — METOCLOPRAMIDE HCL 10 MG
10 TABLET ORAL THREE TIMES A DAY
Refills: 0 | Status: DISCONTINUED | OUTPATIENT
Start: 2022-10-03 | End: 2022-10-05

## 2022-10-03 RX ORDER — OXYCODONE HYDROCHLORIDE 5 MG/1
5 TABLET ORAL THREE TIMES A DAY
Refills: 0 | Status: DISCONTINUED | OUTPATIENT
Start: 2022-10-03 | End: 2022-10-05

## 2022-10-03 RX ORDER — ASPIRIN/CALCIUM CARB/MAGNESIUM 324 MG
81 TABLET ORAL DAILY
Refills: 0 | Status: DISCONTINUED | OUTPATIENT
Start: 2022-10-03 | End: 2022-10-05

## 2022-10-03 RX ORDER — HYDRALAZINE HCL 50 MG
25 TABLET ORAL THREE TIMES A DAY
Refills: 0 | Status: DISCONTINUED | OUTPATIENT
Start: 2022-10-03 | End: 2022-10-05

## 2022-10-03 RX ORDER — DIPHENHYDRAMINE HCL 50 MG
25 CAPSULE ORAL DAILY
Refills: 0 | Status: DISCONTINUED | OUTPATIENT
Start: 2022-10-03 | End: 2022-10-05

## 2022-10-03 RX ORDER — SODIUM ZIRCONIUM CYCLOSILICATE 10 G/10G
5 POWDER, FOR SUSPENSION ORAL ONCE
Refills: 0 | Status: COMPLETED | OUTPATIENT
Start: 2022-10-03 | End: 2022-10-03

## 2022-10-03 RX ORDER — ACETAMINOPHEN 500 MG
650 TABLET ORAL EVERY 6 HOURS
Refills: 0 | Status: DISCONTINUED | OUTPATIENT
Start: 2022-10-03 | End: 2022-10-05

## 2022-10-03 RX ORDER — ALBUTEROL 90 UG/1
2 AEROSOL, METERED ORAL EVERY 6 HOURS
Refills: 0 | Status: DISCONTINUED | OUTPATIENT
Start: 2022-10-03 | End: 2022-10-05

## 2022-10-03 NOTE — PATIENT PROFILE ADULT - NSPROEXTENSIONSOFSELF_GEN_A_NUR
Problem: Pressure Injury, Risk for  Goal: No new pressure injury (PI) development  Outcome: Outcome Met, Continue evaluating goal progress toward completion     Problem: Pain  Goal: #Acceptable pain level achieved/maintained at rest using NRS/Faces  Description: This goal is used for patients who can self-report.  Acceptable means the level is at or below the identified comfort/function goal.  Outcome: Outcome Met, Continue evaluating goal progress toward completion      none

## 2022-10-03 NOTE — PATIENT PROFILE ADULT - FALL HARM RISK - RISK INTERVENTIONS

## 2022-10-03 NOTE — H&P ADULT - NSICDXPASTMEDICALHX_GEN_ALL_CORE_FT
PAST MEDICAL HISTORY:  Adrenal insufficiency h/o    Anemia     Bladder mass hx of    BPH (benign prostatic hyperplasia)     Cataract both eyes - hx of sx done    CHF (congestive heart failure)     Chronic GERD     Contracture of hand fingers of right and left hand    Coronary artery disease     Depression     Diabetes mellitus     Diabetic neuropathy     ESRD on hemodialysis     Glaucoma     H/O hematuria     HLD (hyperlipidemia)     Hyperparathyroidism     Hypertension     Osteoarthritis     Osteomyelitis of vertebra     Osteoporosis     Peripheral vascular disease     Spinal stenosis of lumbosacral region     UTI (urinary tract infection) hx of    Vision loss of left eye blind    Vision loss of right eye decreased

## 2022-10-03 NOTE — H&P ADULT - ATTENDING COMMENTS
This is a 62 year old male, coming from [facility], with medical history of prior upper GI bleed, hypertension, anemia, CAD, hyperlipidemia, diabetes mellitus, GERD and adrenal insuffiencey, ESRD on HD T,Th,S reports to the ED for vomiting blood. Pt was admitted in april for similar episode, at that time pt was scheduled for endoscopy but refused it on the day of. He states he had 2 episodes of coffee-ground emesis that started yesterday. He denies any headaches, visual disturbances, N/V/D, dizziness, falls, chest pain, palpitations, lower extremity swelling, fevers, skin rash, recent travel, or sick contacts.    # HYPERKALEMIA  - PATIENT GIVEN LOKELMA  - DESPITE EXTENSIVE COUNSELLING BY TEAM PATIENT REFUSES BLOODWORK AND HD. PATIENT VERBALIZED UNDERSTANDING THAT REFUSING INTERVENTION AND EVALUATION COULD BE FATAL, BUT HE CONTINUES TO REFUSE.   - MONITOR EKG  - TELEMTRY  - PLAN D/W NEPHROLOGY, RESIDENT TEAM    # RECURRENT EMESIS [?HEMATEMESIS] W/ HISTORY OF ESOPHAGITIS AND DUODENITIS [1/2021], HX OF GASTROPARESIS W/ EVIDENCE OF THICKENED ESOPHAGUS ON PREVIOUS CT SCAN   - RECENT EGD AT [facility] - DEMONSTRATED RETAINED FOOD PRODUCT IN STOMACH, RECOMMENDED FRO GASTRIC EMPTYING STUDY  - MONITORING HGB, PLACED ON PPI BID  - CARAFATE, PRN ANTIEMETICS  - CLD, MONITORING FOR EMESIS  - GASTROENTEROLOGY CONSULT - DR. PONCE    # ESRD ON HD TTS - W/ HX OF NONCOMPLIANCE    # PANCYTOPENIA   - F/U ANEMIA PANEL  - F/U HIV AND HEPATITIS    - TRENDING HGB  - TYPE AND SCREEN    # SEVERE PROTEIN CALORIE MALNUTRITION, FAILURE TO THRIVE - NUTRITIONAL SUPPLEMENT  # HX OF ANEMIA OF CKD  # HLD  # HTN  # DM   # PARTIALLY BLIND  # S/P LEFT BKA  # HX OF PVD  # LS SPINAL STENOSIS  # GI AND DVT PPX.

## 2022-10-03 NOTE — H&P ADULT - PROBLEM SELECTOR PLAN 6
Patricia Hawkins MD  [facility] Pt has a history of X.   - Will continue pts home medication X. - SCD - DVT. - Pt has a history of Hypothyroid.   - Will continue pts home medication Synthroid.

## 2022-10-03 NOTE — H&P ADULT - HISTORY OF PRESENT ILLNESS
61 y/o male sent from nursing home for coffee-ground emesis for the past 3 days. This is a 62 year old male, coming from [facility], with medical history of prior upper GI bleed, hypertension, anemia, CAD, hyperlipidemia, diabetes mellitus, GERD and adrenal insuffiencey, ESRD on HD T,Th,S reports to the ED for vomiting blood. Pt was admitted in april for similar episode, at that time pt was scheduled for endoscopy but refused it on the day of. He states he had 2 episodes of coffee-ground emesis that started yesterday. He denies any headaches, visual disturbances, N/V/D, dizziness, falls, chest pain, palpitations, lower extremity swelling, fevers, skin rash, recent travel, or sick contacts

## 2022-10-03 NOTE — PATIENT PROFILE ADULT - HOW PATIENT ADDRESSED, PROFILE
ICD-10-CM    1. Gastroenteritis  K52.9     take zofran as needed for nausea. unclear cause.  stay huydrated with fluid 64 oz per day.   2. Abdominal aortic aneurysm (AAA) without rupture (H)  I71.4 Vascular Surgery Referral    discussed with vascular surgery. follow-up - consult written   3. Iliac artery aneurysm, left (H)  I72.3 Vascular Surgery Referral   4. Hypertension goal BP (blood pressure) < 140/90  I10     blood pressure has beedn elevated and given AAA, I'm recommending we start BP med and follow-up clinic.  wait to start until diarrhea and vomting stop.   5. Lymphadenopathy  R59.1     found on CT abdomen - unclear cause.  also the adrenal gland has atypical frindings.  follw-up for directed CT adrenal        Maikol

## 2022-10-03 NOTE — H&P ADULT - ASSESSMENT
63 y/o male sent from nursing home for coffee-ground emesis for the past 3 days. This is a 62 year old male, coming from [facility], with medical history of prior upper GI bleed, hypertension, anemia, CAD, hyperlipidemia, diabetes mellitus, GERD and adrenal insuffiencey, ESRD on HD T,Th,S reports to the ED for vomiting blood.

## 2022-10-03 NOTE — ED ADULT NURSE REASSESSMENT NOTE - NS ED NURSE REASSESS COMMENT FT1
Pt is alert and oriented x3. Pt pulled out his IV. Pt refused IV, meds, vs, and tele monitor. MICHI Ocampo Pt is alert and oriented x3. Pt pulled out his IV. Pt refused IV, meds, vs, and tele monitor. MICHI Ocampo. Left below the knee amputation noted. Prosthetic leg presents at the bedside. Pt reports he is blind. Pt ambulates with steady gaits and stand-by assist. Safety and fall precautions maintained.

## 2022-10-03 NOTE — H&P ADULT - PROBLEM SELECTOR PLAN 4
ESRD on HD on  HD as per nephro  c/w sevelamer  Renal diet when not NPO  Nephro consulted: h/o DM  f/u A1c  On lantus 4 units BID  c/w  sliding scale  Adjust insulin as indicated  FS ACHS q6 while NPO ESRD on HD on  HD as per nephro  c/w sevelamer  Renal diet when not NPO  Nephro consulted: Dr. Mosher.

## 2022-10-03 NOTE — ED PROVIDER NOTE - OBJECTIVE STATEMENT
61 y/o male sent from nursing home for coffee-ground emesis for the past 3 days. Patient end-stage, on dialysis Tuesday, Thursday, Saturday, was last dialyzed on Saturday. Also history of CHF, CAD, diabetes, BPH, and a left BKA. Patient denies abdominal pain; no diarrhea. Says he had a normal bowel movement yesterday. NKDA.

## 2022-10-03 NOTE — ED ADULT NURSE REASSESSMENT NOTE - NS ED NURSE REASSESS COMMENT FT1
difficulty getting IV and blood draw, patient refused to allow 2nd, refused covid swab. patient denies pain, no vomiting at this time. Dr Chowdhury made aware

## 2022-10-03 NOTE — H&P ADULT - PROBLEM SELECTOR PLAN 1
p/w melena/BRBPR  protonix 40 bid  CBC q8  transfuse upRBC  f/u post transfusion cbc  HB goal >7 / 8 as pt w/ CAD p/w coffee ground emesis x2.  protonix 40 bid  CBC q8  HB goal >7  EGD 08/22- Aborted due to presence of food in stomach.   GI Dr XXX consulted. p/w coffee ground emesis x2.  protonix 40 bid  CBC q8  HB goal >7  EGD 08/22- Aborted due to presence of food in stomach.   GI Dr Lee consulted.

## 2022-10-03 NOTE — ED PROVIDER NOTE - PROGRESS NOTE DETAILS
Pt s/o pending labwork and anticipated admission. Pt refused labs, IV placement earlier. Pt educated on labs, possible need for earlier dialysis. Pt refused dialysis tonight, r/b/a explained. Understands r/o worsening breathing difficulty leading to ventilator, worsening potassium level leadint o cardiac arrest and death. Pt understands but refused dialysis tonight. Pt also urged to maintain FM to allow adequate saturation. Pt demonstrates adequate decision making capacity. Consulted Dr. Mosher on case. Case d/w Dr. MIRANDA De La Torre and MADELEINE Cain.

## 2022-10-03 NOTE — H&P ADULT - NSHPPHYSICALEXAM_GEN_ALL_CORE
Vital Signs Last 24 Hrs  T(C): --  T(F): --  HR: 102 (03 Oct 2022 17:21) (102 - 102)  BP: 172/83 (03 Oct 2022 17:21) (172/83 - 188/101)  BP(mean): --  RR: 18 (03 Oct 2022 17:21) (18 - 18)  SpO2: 100% (03 Oct 2022 17:21) (88% - 100%)    Parameters below as of 03 Oct 2022 17:21  Patient On (Oxygen Delivery Method): mask, nonrebreather    PHYSICAL EXAM:  GENERAL: NAD, speaks in full sentences, no signs of respiratory distress  HEAD:  Atraumatic, Normocephalic  EYES: EOMI, PERRLA, conjunctiva and sclera clear  NECK: Supple, No JVD  CHEST/LUNG: Clear to auscultation bilaterally; No wheeze; No crackles; No accessory muscles used  HEART: Regular rate and rhythm; No murmurs;   ABDOMEN: Soft, Nontender, Nondistended; Bowel sounds present; No guarding  EXTREMITIES:  Left leg BKA.  PSYCH: AAOx3  NEUROLOGY: non-focal  SKIN: No rashes or lesions Vital Signs Last 24 Hrs  T(C): --  T(F): --  HR: 102 (03 Oct 2022 17:21) (102 - 102)  BP: 172/83 (03 Oct 2022 17:21) (172/83 - 188/101)  BP(mean): --  RR: 18 (03 Oct 2022 17:21) (18 - 18)  SpO2: 100% (03 Oct 2022 17:21) (88% - 100%)    Parameters below as of 03 Oct 2022 17:21  Patient On (Oxygen Delivery Method): mask, nonrebreather    PHYSICAL EXAM:  GENERAL: NAD, speaks in full sentences, no signs of respiratory distress  HEAD:  Atraumatic, Normocephalic  EYES: EOMI, PERRLA, conjunctiva and sclera clear  NECK: Supple, No JVD  CHEST/LUNG: Crackles b/l lungs.   HEART: Regular rate and rhythm; No murmurs;   ABDOMEN: Soft, Nontender, Nondistended; Bowel sounds present; No guarding  EXTREMITIES:  Left leg BKA.  PSYCH: AAOx3  NEUROLOGY: non-focal  SKIN: No rashes or lesions

## 2022-10-03 NOTE — H&P ADULT - PROBLEM SELECTOR PLAN 3
- Pt has a history of X.   - Will continue pts home medication X. ESRD on HD on  HD as per nephro  c/w sevelamer  Renal diet when not NPO  Nephro consulted: Dr. Mosher. - Pt has a history of HTN.   - Will start pts home medication with parameters.

## 2022-10-03 NOTE — ED ADULT NURSE NOTE - OBJECTIVE STATEMENT
patient BIBA as per EMS found unresponsive in wheelchair at nursing home. Patient is A&OX3, denies pain/discomfort.

## 2022-10-03 NOTE — ED ADULT NURSE NOTE - ED STAT RN HANDOFF DETAILS 2
Pt continued to refuse IV. Pt was seen by ITZ Mcdaniel.  Pt agrees to do vital signs. Vital signs obtained. Pt is placed on portable Pt is alert and oriented x3. Pt is noncooperative and disrespectful toward staff. Pt continues to refuse IV.  O2 sat of 92% on RA. No sign of respiratory distress. Pt requested nonrebreather mask. No sign of respiratory distress noted. Education provided on supplemental oxygen. Pt agrees to use nasal cannula. Pt placed on 3l of oxygen via nc. Pt tolerated well. Pt refused treatment Pt was seen and evaluated at the bedside by ITZ Mcdaniel.  Pt agrees to do vital signs and be placed on tele monitor . Vital signs obtained. Pt is placed on portable cardiac monitor. Pt is transferred to  in stable condition. No sign of acute distress noted during transport.

## 2022-10-03 NOTE — ED ADULT NURSE NOTE - NSICDXPASTMEDICALHX_GEN_ALL_CORE_FT
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

## 2022-10-03 NOTE — ED ADULT NURSE NOTE - CHIEF COMPLAINT QUOTE
jia sent from [facility] assisted living for coffee ground vomiting x 3 days no abd pain

## 2022-10-03 NOTE — H&P ADULT - PROBLEM SELECTOR PLAN 2
- Pt has a history of HTN.   - Will start pts home medication of X with parameters. - Pt has a history of HTN.   - Will start pts home medication with parameters. - Pt presnted with hyperkalemia - 6.0  - s/p lokelma   - f/u morning BMP

## 2022-10-03 NOTE — H&P ADULT - PROBLEM SELECTOR PLAN 5
h/o DM on - hold oral dm meds  f/u A1c  On lantus + lispro at home  c/w lantus + lispro + sliding scale  Adjust insulin as indicated  FS ACHS q6 while NPO - Pt has a history of Hypothyroid.   - Will continue pts home medication Synthroid. h/o DM  f/u A1c  On lantus 4 units BID  c/w  sliding scale  Adjust insulin as indicated  FS ACHS q6 while NPO

## 2022-10-04 DIAGNOSIS — Z02.9 ENCOUNTER FOR ADMINISTRATIVE EXAMINATIONS, UNSPECIFIED: ICD-10-CM

## 2022-10-04 LAB
ANION GAP SERPL CALC-SCNC: 9 MMOL/L — SIGNIFICANT CHANGE UP (ref 5–17)
BUN SERPL-MCNC: 61 MG/DL — HIGH (ref 7–18)
CALCIUM SERPL-MCNC: 8.5 MG/DL — SIGNIFICANT CHANGE UP (ref 8.4–10.5)
CALCIUM SERPL-MCNC: 8.5 MG/DL — SIGNIFICANT CHANGE UP (ref 8.4–10.5)
CHLORIDE SERPL-SCNC: 94 MMOL/L — LOW (ref 96–108)
CHOLEST SERPL-MCNC: 167 MG/DL — SIGNIFICANT CHANGE UP
CO2 SERPL-SCNC: 31 MMOL/L — SIGNIFICANT CHANGE UP (ref 22–31)
CREAT SERPL-MCNC: 14 MG/DL — HIGH (ref 0.5–1.3)
EGFR: 4 ML/MIN/1.73M2 — LOW
GLUCOSE BLDC GLUCOMTR-MCNC: 101 MG/DL — HIGH (ref 70–99)
GLUCOSE BLDC GLUCOMTR-MCNC: 157 MG/DL — HIGH (ref 70–99)
GLUCOSE BLDC GLUCOMTR-MCNC: 197 MG/DL — HIGH (ref 70–99)
GLUCOSE BLDC GLUCOMTR-MCNC: 47 MG/DL — CRITICAL LOW (ref 70–99)
GLUCOSE SERPL-MCNC: 134 MG/DL — HIGH (ref 70–99)
HCT VFR BLD CALC: 27.1 % — LOW (ref 39–50)
HDLC SERPL-MCNC: 50 MG/DL — SIGNIFICANT CHANGE UP
HGB BLD-MCNC: 8.6 G/DL — LOW (ref 13–17)
LIPID PNL WITH DIRECT LDL SERPL: 85 MG/DL — SIGNIFICANT CHANGE UP
MAGNESIUM SERPL-MCNC: 3.4 MG/DL — HIGH (ref 1.6–2.6)
MCHC RBC-ENTMCNC: 30.9 PG — SIGNIFICANT CHANGE UP (ref 27–34)
MCHC RBC-ENTMCNC: 31.7 GM/DL — LOW (ref 32–36)
MCV RBC AUTO: 97.5 FL — SIGNIFICANT CHANGE UP (ref 80–100)
NON HDL CHOLESTEROL: 117 MG/DL — SIGNIFICANT CHANGE UP
NRBC # BLD: 0 /100 WBCS — SIGNIFICANT CHANGE UP (ref 0–0)
PHOSPHATE SERPL-MCNC: 6.5 MG/DL — HIGH (ref 2.5–4.5)
PLATELET # BLD AUTO: 78 K/UL — LOW (ref 150–400)
POTASSIUM SERPL-MCNC: 6.2 MMOL/L — CRITICAL HIGH (ref 3.5–5.3)
POTASSIUM SERPL-SCNC: 6.2 MMOL/L — CRITICAL HIGH (ref 3.5–5.3)
PTH-INTACT FLD-MCNC: 128 PG/ML — HIGH (ref 15–65)
RBC # BLD: 2.78 M/UL — LOW (ref 4.2–5.8)
RBC # FLD: 18.7 % — HIGH (ref 10.3–14.5)
SODIUM SERPL-SCNC: 134 MMOL/L — LOW (ref 135–145)
TRIGL SERPL-MCNC: 158 MG/DL — HIGH
WBC # BLD: 2.54 K/UL — LOW (ref 3.8–10.5)
WBC # FLD AUTO: 2.54 K/UL — LOW (ref 3.8–10.5)

## 2022-10-04 PROCEDURE — 99222 1ST HOSP IP/OBS MODERATE 55: CPT

## 2022-10-04 RX ORDER — INSULIN LISPRO 100/ML
VIAL (ML) SUBCUTANEOUS AT BEDTIME
Refills: 0 | Status: DISCONTINUED | OUTPATIENT
Start: 2022-10-04 | End: 2022-10-05

## 2022-10-04 RX ORDER — DEXTROSE 50 % IN WATER 50 %
25 SYRINGE (ML) INTRAVENOUS ONCE
Refills: 0 | Status: DISCONTINUED | OUTPATIENT
Start: 2022-10-04 | End: 2022-10-05

## 2022-10-04 RX ORDER — GLUCAGON INJECTION, SOLUTION 0.5 MG/.1ML
1 INJECTION, SOLUTION SUBCUTANEOUS ONCE
Refills: 0 | Status: DISCONTINUED | OUTPATIENT
Start: 2022-10-04 | End: 2022-10-04

## 2022-10-04 RX ORDER — SODIUM ZIRCONIUM CYCLOSILICATE 10 G/10G
5 POWDER, FOR SUSPENSION ORAL ONCE
Refills: 0 | Status: COMPLETED | OUTPATIENT
Start: 2022-10-04 | End: 2022-10-04

## 2022-10-04 RX ORDER — INSULIN LISPRO 100/ML
VIAL (ML) SUBCUTANEOUS
Refills: 0 | Status: DISCONTINUED | OUTPATIENT
Start: 2022-10-04 | End: 2022-10-05

## 2022-10-04 RX ORDER — SODIUM CHLORIDE 9 MG/ML
1000 INJECTION, SOLUTION INTRAVENOUS
Refills: 0 | Status: DISCONTINUED | OUTPATIENT
Start: 2022-10-04 | End: 2022-10-05

## 2022-10-04 RX ORDER — ERYTHROPOIETIN 10000 [IU]/ML
10000 INJECTION, SOLUTION INTRAVENOUS; SUBCUTANEOUS
Refills: 0 | Status: DISCONTINUED | OUTPATIENT
Start: 2022-10-04 | End: 2022-10-05

## 2022-10-04 RX ORDER — DEXTROSE 50 % IN WATER 50 %
12.5 SYRINGE (ML) INTRAVENOUS ONCE
Refills: 0 | Status: DISCONTINUED | OUTPATIENT
Start: 2022-10-04 | End: 2022-10-05

## 2022-10-04 RX ORDER — SUCRALFATE 1 G
1 TABLET ORAL
Refills: 0 | Status: DISCONTINUED | OUTPATIENT
Start: 2022-10-04 | End: 2022-10-05

## 2022-10-04 RX ORDER — DEXTROSE 50 % IN WATER 50 %
15 SYRINGE (ML) INTRAVENOUS ONCE
Refills: 0 | Status: DISCONTINUED | OUTPATIENT
Start: 2022-10-04 | End: 2022-10-05

## 2022-10-04 RX ORDER — GLUCAGON INJECTION, SOLUTION 0.5 MG/.1ML
1 INJECTION, SOLUTION SUBCUTANEOUS ONCE
Refills: 0 | Status: DISCONTINUED | OUTPATIENT
Start: 2022-10-04 | End: 2022-10-05

## 2022-10-04 RX ORDER — PANTOPRAZOLE SODIUM 20 MG/1
40 TABLET, DELAYED RELEASE ORAL
Refills: 0 | Status: DISCONTINUED | OUTPATIENT
Start: 2022-10-04 | End: 2022-10-05

## 2022-10-04 RX ORDER — AMLODIPINE BESYLATE 2.5 MG/1
10 TABLET ORAL ONCE
Refills: 0 | Status: COMPLETED | OUTPATIENT
Start: 2022-10-04 | End: 2022-10-04

## 2022-10-04 RX ADMIN — OXYCODONE HYDROCHLORIDE 5 MILLIGRAM(S): 5 TABLET ORAL at 06:45

## 2022-10-04 RX ADMIN — OXYCODONE HYDROCHLORIDE 5 MILLIGRAM(S): 5 TABLET ORAL at 05:53

## 2022-10-04 RX ADMIN — Medication 81 MILLIGRAM(S): at 16:41

## 2022-10-04 RX ADMIN — Medication 1 TABLET(S): at 17:48

## 2022-10-04 RX ADMIN — OXYCODONE HYDROCHLORIDE 5 MILLIGRAM(S): 5 TABLET ORAL at 18:33

## 2022-10-04 RX ADMIN — ERYTHROPOIETIN 10000 UNIT(S): 10000 INJECTION, SOLUTION INTRAVENOUS; SUBCUTANEOUS at 12:52

## 2022-10-04 RX ADMIN — Medication 1: at 17:46

## 2022-10-04 RX ADMIN — OXYCODONE HYDROCHLORIDE 5 MILLIGRAM(S): 5 TABLET ORAL at 16:52

## 2022-10-04 RX ADMIN — PANTOPRAZOLE SODIUM 40 MILLIGRAM(S): 20 TABLET, DELAYED RELEASE ORAL at 18:33

## 2022-10-04 RX ADMIN — Medication 1 TABLET(S): at 05:53

## 2022-10-04 RX ADMIN — Medication 25 MICROGRAM(S): at 05:53

## 2022-10-04 RX ADMIN — Medication 25 MILLIGRAM(S): at 05:53

## 2022-10-04 RX ADMIN — Medication 10 MILLIGRAM(S): at 22:37

## 2022-10-04 RX ADMIN — Medication 10 MILLIGRAM(S): at 05:55

## 2022-10-04 RX ADMIN — OXYCODONE HYDROCHLORIDE 5 MILLIGRAM(S): 5 TABLET ORAL at 22:37

## 2022-10-04 RX ADMIN — AMLODIPINE BESYLATE 10 MILLIGRAM(S): 2.5 TABLET ORAL at 01:03

## 2022-10-04 RX ADMIN — Medication 25 MILLIGRAM(S): at 16:41

## 2022-10-04 RX ADMIN — Medication 25 MILLIGRAM(S): at 22:40

## 2022-10-04 RX ADMIN — Medication 25 MILLIGRAM(S): at 05:54

## 2022-10-04 RX ADMIN — Medication 1 MILLIGRAM(S): at 16:42

## 2022-10-04 RX ADMIN — Medication 650 MILLIGRAM(S): at 22:36

## 2022-10-04 NOTE — PROGRESS NOTE ADULT - SUBJECTIVE AND OBJECTIVE BOX
`Patient is a 62y old  Male who presents with a chief complaint of Hematemesis (03 Oct 2022 19:58)    PATIENT IS SEEN AND EXAMINED IN MEDICAL FLOOR.  NGT [    ]    LUNA [   ]      GT [   ]    ALLERGIES:  fish (Rash)  liver (Anaphylaxis)  No Known Drug Allergies      Daily Height in cm: 167.64 (03 Oct 2022 12:06)    Daily Weight in k.2 (04 Oct 2022 05:40)    VITALS:    Vital Signs Last 24 Hrs  T(C): 36.8 (04 Oct 2022 07:43), Max: 36.9 (03 Oct 2022 22:55)  T(F): 98.3 (04 Oct 2022 07:43), Max: 98.4 (03 Oct 2022 22:55)  HR: 96 (04 Oct 2022 07:43) (90 - 102)  BP: 147/73 (04 Oct 2022 07:43) (145/73 - 188/101)  BP(mean): --  RR: 18 (04 Oct 2022 07:43) (18 - 20)  SpO2: 96% (04 Oct 2022 07:43) (88% - 100%)    Parameters below as of 04 Oct 2022 07:43  Patient On (Oxygen Delivery Method): mask, nonrebreather        LABS:    CBC Full  -  ( 03 Oct 2022 16:30 )  WBC Count : 3.14 K/uL  RBC Count : 2.97 M/uL  Hemoglobin : 9.0 g/dL  Hematocrit : 29.1 %  Platelet Count - Automated : 73 K/uL  Mean Cell Volume : 98.0 fl  Mean Cell Hemoglobin : 30.3 pg  Mean Cell Hemoglobin Concentration : 30.9 gm/dL  Auto Neutrophil # : 2.52 K/uL  Auto Lymphocyte # : 0.38 K/uL  Auto Monocyte # : 0.14 K/uL  Auto Eosinophil # : 0.08 K/uL  Auto Basophil # : 0.01 K/uL  Auto Neutrophil % : 80.3 %  Auto Lymphocyte % : 12.1 %  Auto Monocyte % : 4.5 %  Auto Eosinophil % : 2.5 %  Auto Basophil % : 0.3 %    PT/INR - ( 03 Oct 2022 16:30 )   PT: 11.8 sec;   INR: 0.99 ratio         PTT - ( 03 Oct 2022 16:30 )  PTT:29.7 sec  1003    137  |  98  |  49<H>  ----------------------------<  85  6.0<H>   |  31  |  12.00<H>    Ca    8.5      03 Oct 2022 16:30  Mg     3.0     10-03    TPro  6.7  /  Alb  3.2<L>  /  TBili  0.7  /  DBili  x   /  AST  24  /  ALT  16  /  AlkPhos  76  10-03    CAPILLARY BLOOD GLUCOSE            LIVER FUNCTIONS - ( 03 Oct 2022 16:30 )  Alb: 3.2 g/dL / Pro: 6.7 g/dL / ALK PHOS: 76 U/L / ALT: 16 U/L DA / AST: 24 U/L / GGT: x           Creatinine Trend: 12.00<--  I&O's Summary              MEDICATIONS:    MEDICATIONS  (STANDING):  ALBUTerol    90 MICROgram(s) HFA Inhaler 2 Puff(s) Inhalation every 6 hours  amLODIPine   Tablet 10 milliGRAM(s) Oral daily  aspirin  chewable 81 milliGRAM(s) Oral daily  budesonide 160 MICROgram(s)/formoterol 4.5 MICROgram(s) Inhaler 2 Puff(s) Inhalation two times a day  calcium carbonate    500 mG (Tums) Chewable 1 Tablet(s) Chew two times a day  diphenhydrAMINE 25 milliGRAM(s) Oral daily  epoetin beverley-epbx (RETACRIT) Injectable 17748 Unit(s) IV Push <User Schedule>  folic acid 1 milliGRAM(s) Oral daily  hydrALAZINE 25 milliGRAM(s) Oral three times a day  levothyroxine 25 MICROGram(s) Oral daily  lidocaine   4% Patch 1 Patch Transdermal daily  metoclopramide 10 milliGRAM(s) Oral three times a day  metoprolol succinate ER 25 milliGRAM(s) Oral daily  oxyCODONE    IR 5 milliGRAM(s) Oral three times a day  pantoprazole  Injectable 40 milliGRAM(s) IV Push every 24 hours      MEDICATIONS  (PRN):  acetaminophen     Tablet .. 650 milliGRAM(s) Oral every 6 hours PRN Temp greater or equal to 38C (100.4F), Mild Pain (1 - 3)  aluminum hydroxide/magnesium hydroxide/simethicone Suspension 30 milliLiter(s) Oral every 4 hours PRN Dyspepsia  melatonin 3 milliGRAM(s) Oral at bedtime PRN Insomnia  ondansetron Injectable 4 milliGRAM(s) IV Push every 8 hours PRN Nausea and/or Vomiting      REVIEW OF SYSTEMS:                           ALL ROS DONE [ X   ]    CONSTITUTIONAL:  LETHARGIC [   ], FEVER [   ], UNRESPONSIVE [   ]  CVS:  CP  [   ], SOB, [   ], PALPITATIONS [   ], DIZZYNESS [   ]  RS: COUGH [   ], SPUTUM [   ]  GI: ABDOMINAL PAIN [   ], NAUSEA [   ], VOMITINGS [   ], DIARRHEA [   ], CONSTIPATION [   ]  :  DYSURIA [   ], NOCTURIA [   ], INCREASED FREQUENCY [   ], DRIBLING [   ],  SKELETAL: PAINFUL JOINTS [   ], SWOLLEN JOINTS [   ], NECK ACHE [   ], LOW BACK ACHE [   ],  SKIN : ULCERS [   ], RASH [   ], ITCHING [   ]  CNS: HEAD ACHE [   ], DOUBLE VISION [   ], BLURRED VISION [   ], AMS / CONFUSION [   ], SEIZURES [   ], WEAKNESS [   ],TINGLING / NUMBNESS [   ]    PHYSICAL EXAMINATION:  GENERAL APPEARANCE: NO DISTRESS  HEENT:  NO PALLOR, NO  JVD,  NO   NODES, NECK SUPPLE  CVS: S1 +, S2 +,   RS: AEEB,  OCCASIONAL  RALES +,   NO RONCHI  ABD: SOFT, NT, NO, BS +  EXT: NO PE  SKIN: WARM,   SKELETAL:  ROM ACCEPTABLE  CNS:  AAO X    ,   DEFICITS    RADIOLOGY :      ASSESSMENT :     Hematemesis    No pertinent past medical history    Hypertension    Adrenal insufficiency    CKD (chronic kidney disease)    Anemia    Glaucoma    Coronary artery disease    HLD (hyperlipidemia)    Peripheral vascular disease    Spinal stenosis of lumbosacral region    Hyperparathyroidism    Diabetes mellitus    Diabetic neuropathy    Contracture of hand    Osteoarthritis    Osteoporosis    Vision loss of left eye    ESRD on hemodialysis    Cataract    BPH (benign prostatic hyperplasia)    UTI (urinary tract infection)    Bladder mass    H/O hematuria    Osteoporosis    Vision loss of right eye    Depression    Chronic GERD    Osteomyelitis of vertebra    CHF (congestive heart failure)    No significant past surgical history    Below knee amputation status, left    History of right cataract extraction    History of left cataract extraction    S/P arteriovenous (AV) fistula creation    H/O hematuria    H/O transurethral destruction of bladder lesion    History of excision of mass        PLAN:  HPI:  This is a 62 year old male, coming from [facility], with medical history of prior upper GI bleed, hypertension, anemia, CAD, hyperlipidemia, diabetes mellitus, GERD and adrenal insuffiencey, ESRD on HD ,,S reports to the ED for vomiting blood. Pt was admitted in april for similar episode, at that time pt was scheduled for endoscopy but refused it on the day of. He states he had 2 episodes of coffee-ground emesis that started yesterday. He denies any headaches, visual disturbances, N/V/D, dizziness, falls, chest pain, palpitations, lower extremity swelling, fevers, skin rash, recent travel, or sick contacts (03 Oct 2022 19:58)      # HYPERKALEMIA  - PATIENT GIVEN LOKELMA  - DESPITE EXTENSIVE COUNSELLING BY TEAM PATIENT REFUSES BLOODWORK AND HD. PATIENT VERBALIZED UNDERSTANDING THAT REFUSING INTERVENTION AND EVALUATION COULD BE FATAL, BUT HE CONTINUES TO REFUSE.   - MONITOR EKG  - TELEMTRY  - PLAN D/W NEPHROLOGY, RESIDENT TEAM    # RECURRENT EMESIS [?HEMATEMESIS] W/ HISTORY OF ESOPHAGITIS AND DUODENITIS [2021], HX OF GASTROPARESIS W/ EVIDENCE OF THICKENED ESOPHAGUS ON PREVIOUS CT SCAN   - RECENT EGD AT [facility] - DEMONSTRATED RETAINED FOOD PRODUCT IN STOMACH, RECOMMENDED FRO GASTRIC EMPTYING STUDY  - MONITORING HGB, PLACED ON PPI BID  - CARAFATE, PRN ANTIEMETICS  - CLD, MONITORING FOR EMESIS  - GASTROENTEROLOGY CONSULT - DR. PONCE    # ESRD ON HD TTS - W/ HX OF NONCOMPLIANCE    # PANCYTOPENIA   - F/U ANEMIA PANEL  - F/U HIV AND HEPATITIS    - TRENDING HGB  - TYPE AND SCREEN    # SEVERE PROTEIN CALORIE MALNUTRITION, FAILURE TO THRIVE - NUTRITIONAL SUPPLEMENT  # HX OF ANEMIA OF CKD  # HLD  # HTN  # DM   # PARTIALLY BLIND  # S/P LEFT BKA  # HX OF PVD  # LS SPINAL STENOSIS  # GI AND DVT PPX.    Patient is a 62y old  Male who presents with a chief complaint of Hematemesis (03 Oct 2022 19:58)    PATIENT IS SEEN AND EXAMINED IN MEDICAL FLOOR.    ALLERGIES:  fish (Rash)  liver (Anaphylaxis)  No Known Drug Allergies      Daily Height in cm: 167.64 (03 Oct 2022 12:06)    Daily Weight in k.2 (04 Oct 2022 05:40)    VITALS:    Vital Signs Last 24 Hrs  T(C): 36.8 (04 Oct 2022 07:43), Max: 36.9 (03 Oct 2022 22:55)  T(F): 98.3 (04 Oct 2022 07:43), Max: 98.4 (03 Oct 2022 22:55)  HR: 96 (04 Oct 2022 07:43) (90 - 102)  BP: 147/73 (04 Oct 2022 07:43) (145/73 - 188/101)  BP(mean): --  RR: 18 (04 Oct 2022 07:43) (18 - 20)  SpO2: 96% (04 Oct 2022 07:43) (88% - 100%)    Parameters below as of 04 Oct 2022 07:43  Patient On (Oxygen Delivery Method): mask, nonrebreather        LABS:    CBC Full  -  ( 03 Oct 2022 16:30 )  WBC Count : 3.14 K/uL  RBC Count : 2.97 M/uL  Hemoglobin : 9.0 g/dL  Hematocrit : 29.1 %  Platelet Count - Automated : 73 K/uL  Mean Cell Volume : 98.0 fl  Mean Cell Hemoglobin : 30.3 pg  Mean Cell Hemoglobin Concentration : 30.9 gm/dL  Auto Neutrophil # : 2.52 K/uL  Auto Lymphocyte # : 0.38 K/uL  Auto Monocyte # : 0.14 K/uL  Auto Eosinophil # : 0.08 K/uL  Auto Basophil # : 0.01 K/uL  Auto Neutrophil % : 80.3 %  Auto Lymphocyte % : 12.1 %  Auto Monocyte % : 4.5 %  Auto Eosinophil % : 2.5 %  Auto Basophil % : 0.3 %    PT/INR - ( 03 Oct 2022 16:30 )   PT: 11.8 sec;   INR: 0.99 ratio         PTT - ( 03 Oct 2022 16:30 )  PTT:29.7 sec  10-03    137  |  98  |  49<H>  ----------------------------<  85  6.0<H>   |  31  |  12.00<H>    Ca    8.5      03 Oct 2022 16:30  Mg     3.0     10-03    TPro  6.7  /  Alb  3.2<L>  /  TBili  0.7  /  DBili  x   /  AST  24  /  ALT  16  /  AlkPhos  76  10-03    CAPILLARY BLOOD GLUCOSE            LIVER FUNCTIONS - ( 03 Oct 2022 16:30 )  Alb: 3.2 g/dL / Pro: 6.7 g/dL / ALK PHOS: 76 U/L / ALT: 16 U/L DA / AST: 24 U/L / GGT: x           Creatinine Trend: 12.00<--  I&O's Summary              MEDICATIONS:    MEDICATIONS  (STANDING):  ALBUTerol    90 MICROgram(s) HFA Inhaler 2 Puff(s) Inhalation every 6 hours  amLODIPine   Tablet 10 milliGRAM(s) Oral daily  aspirin  chewable 81 milliGRAM(s) Oral daily  budesonide 160 MICROgram(s)/formoterol 4.5 MICROgram(s) Inhaler 2 Puff(s) Inhalation two times a day  calcium carbonate    500 mG (Tums) Chewable 1 Tablet(s) Chew two times a day  diphenhydrAMINE 25 milliGRAM(s) Oral daily  epoetin beverley-epbx (RETACRIT) Injectable 90707 Unit(s) IV Push <User Schedule>  folic acid 1 milliGRAM(s) Oral daily  hydrALAZINE 25 milliGRAM(s) Oral three times a day  levothyroxine 25 MICROGram(s) Oral daily  lidocaine   4% Patch 1 Patch Transdermal daily  metoclopramide 10 milliGRAM(s) Oral three times a day  metoprolol succinate ER 25 milliGRAM(s) Oral daily  oxyCODONE    IR 5 milliGRAM(s) Oral three times a day  pantoprazole  Injectable 40 milliGRAM(s) IV Push every 24 hours      MEDICATIONS  (PRN):  acetaminophen     Tablet .. 650 milliGRAM(s) Oral every 6 hours PRN Temp greater or equal to 38C (100.4F), Mild Pain (1 - 3)  aluminum hydroxide/magnesium hydroxide/simethicone Suspension 30 milliLiter(s) Oral every 4 hours PRN Dyspepsia  melatonin 3 milliGRAM(s) Oral at bedtime PRN Insomnia  ondansetron Injectable 4 milliGRAM(s) IV Push every 8 hours PRN Nausea and/or Vomiting      REVIEW OF SYSTEMS:                           ALL ROS DONE [ X   ]    CONSTITUTIONAL:  LETHARGIC [   ], FEVER [   ], UNRESPONSIVE [   ]  CVS:  CP  [   ], SOB, [   ], PALPITATIONS [   ], DIZZYNESS [   ]  RS: COUGH [   ], SPUTUM [   ]  GI: ABDOMINAL PAIN [   ], NAUSEA [   ], VOMITINGS [   ], DIARRHEA [   ], CONSTIPATION [   ]  :  DYSURIA [   ], NOCTURIA [   ], INCREASED FREQUENCY [   ], DRIBLING [   ],  SKELETAL: PAINFUL JOINTS [   ], SWOLLEN JOINTS [   ], NECK ACHE [   ], LOW BACK ACHE [   ],  SKIN : ULCERS [   ], RASH [   ], ITCHING [   ]  CNS: HEAD ACHE [   ], DOUBLE VISION [   ], BLURRED VISION [   ], AMS / CONFUSION [   ], SEIZURES [   ], WEAKNESS [   ],TINGLING / NUMBNESS [   ]    PHYSICAL EXAMINATION:  GENERAL APPEARANCE: NO DISTRESS  HEENT:  NO PALLOR, NO  JVD,  NO   NODES, NECK SUPPLE  CVS: S1 +, S2 +,   RS: AEEB,  OCCASIONAL  RALES +,   NO RONCHI  ABD: SOFT, NT, NO, BS +  EXT: NO PE  SKIN: WARM,   SKELETAL: LEFT BKA  CNS:  AAO X 3    RADIOLOGY :      ASSESSMENT :     Hematemesis    Hypertension    Adrenal insufficiency    CKD (chronic kidney disease)    Anemia    Glaucoma    Coronary artery disease    HLD (hyperlipidemia)    Peripheral vascular disease    Spinal stenosis of lumbosacral region    Hyperparathyroidism    Diabetes mellitus    Diabetic neuropathy    Contracture of hand    Osteoarthritis    Osteoporosis    Vision loss of left eye    ESRD on hemodialysis    Cataract    BPH (benign prostatic hyperplasia)    UTI (urinary tract infection)    Bladder mass    H/O hematuria    Osteoporosis    Vision loss of right eye    Depression    Chronic GERD    Osteomyelitis of vertebra    CHF (congestive heart failure)    No significant past surgical history    Below knee amputation status, left    History of right cataract extraction    History of left cataract extraction    S/P arteriovenous (AV) fistula creation    H/O hematuria    H/O transurethral destruction of bladder lesion    History of excision of mass        PLAN:  HPI:  This is a 62 year old male, coming from [facility], with medical history of prior upper GI bleed, hypertension, anemia, CAD, hyperlipidemia, diabetes mellitus, GERD and adrenal insuffiencey, ESRD on HD T,,S reports to the ED for vomiting blood. Pt was admitted in april for similar episode, at that time pt was scheduled for endoscopy but refused it on the day of. He states he had 2 episodes of coffee-ground emesis that started yesterday. He denies any headaches, visual disturbances, N/V/D, dizziness, falls, chest pain, palpitations, lower extremity swelling, fevers, skin rash, recent travel, or sick contacts (03 Oct 2022 19:58)    # CASE D/W BROTHER VIA PHONE AT BEDSIDE. CASE DISCUSSED AT LENGTH AND ALL QUESTIONS ANSWERED. D/W PATIENT AND FAMILY THAT PROGNOSIS IS GUARDED.      # HYPERKALEMIA  - PATIENT GIVEN LOKELMA  - DESPITE EXTENSIVE COUNSELLING BY TEAM PATIENT REFUSES BLOODWORK AND HD. PATIENT VERBALIZED UNDERSTANDING THAT REFUSING INTERVENTION AND EVALUATION COULD BE FATAL, BUT HE CONTINUES TO REFUSE.   - MONITOR EKG  - TELEMETRY  - PLAN D/W NEPHROLOGY, RESIDENT TEAM    - [10/4] - UNDERWENT HD, F/U REPEAT BMP AFTER HD    # RECURRENT EMESIS [?HEMATEMESIS] W/ HISTORY OF ESOPHAGITIS AND DUODENITIS [2021], HX OF GASTROPARESIS W/ EVIDENCE OF THICKENED ESOPHAGUS ON PREVIOUS CT SCAN   - RECENT EGD AT [facility] - DEMONSTRATED RETAINED FOOD PRODUCT IN STOMACH, RECOMMENDED FRO GASTRIC EMPTYING STUDY  - MONITORING HGB, PLACED ON PPI BID  - CARAFATE, PRN ANTIEMETICS  - CLD, MONITORING FOR EMESIS  - GASTROENTEROLOGY CONSULT - DR. PONCE    - ADVANCE DIET AS TOLERATED  - PATIENT REFUSED EGD W/ GASTRIC EMPTYING STUDY ; HE VERBALIZED UNDERSTANDING OF RISKS OF DEFERRING FURTHER EVALUATION    # ESRD ON HD TTS - W/ HX OF NONCOMPLIANCE    # PANCYTOPENIA   - F/U ANEMIA PANEL  - F/U HIV AND HEPATITIS    - TRENDING HGB  - TYPE AND SCREEN    # SEVERE PROTEIN CALORIE MALNUTRITION, FAILURE TO THRIVE - NUTRITIONAL SUPPLEMENT  # HX OF ANEMIA OF CKD  # HLD  # HTN  # DM   # PARTIALLY BLIND  # S/P LEFT BKA  # HX OF PVD  # LS SPINAL STENOSIS  # GI AND DVT PPX.

## 2022-10-04 NOTE — PROGRESS NOTE ADULT - ASSESSMENT
This is a 62 year old male, coming from [facility], with medical history of prior upper GI bleed, hypertension, anemia, CAD, hyperlipidemia, diabetes mellitus, L BKA, GERD and adrenal insuffiencey, ESRD on HD T,Th,S reports to the ED for ground coffee emesis. Pt was admitted in april for similar episode, at that time pt was scheduled for endoscopy but refused it on the day of. Patient admitted to tele for GI bleed. Made NPO. GI Dr Lee consulted.   Patient is seen & examined this morning. Refusing to have telemetry box. Advanced diet as tolerated. Nephro Dr. Mosher consulted. For HD today. Pending GI consult.  This is a 62 year old male, coming from [facility], with medical history of prior upper GI bleed, hypertension, anemia, CAD, hyperlipidemia, diabetes mellitus, L BKA, GERD and adrenal insuffiencey, ESRD on HD T,Th,S reports to the ED for ground coffee emesis. Pt was admitted in april for similar episode, at that time pt was scheduled for endoscopy but refused it on the day of. Patient admitted to tele for GI bleed. Made NPO. GI Dr Lee consulted.   Patient is seen & examined this morning. Refusing to have telemetry box. Advanced diet as tolerated. Nephro Dr. Mosher consulted. For HD today. Pending GI & SW consult.

## 2022-10-04 NOTE — CONSULT NOTE ADULT - SUBJECTIVE AND OBJECTIVE BOX
Patient is a 62y old  Male who presents with a chief complaint of Hematemesis (04 Oct 2022 12:23)    HPI:  This is a 62 year old male, coming from [facility], with medical history of prior upper GI bleed, hypertension, anemia, CAD, hyperlipidemia, diabetes mellitus, GERD and adrenal insuffiencey, ESRD on HD T,,S reports to the ED for vomiting blood. Pt was admitted in april for similar episode, at that time pt was scheduled for endoscopy but refused it on the day of. He states he had 2 episodes of coffee-ground emesis that started yesterday. He denies any headaches, visual disturbances, N/V/D, dizziness, falls, chest pain, palpitations, lower extremity swelling, fevers, skin rash, recent travel, or sick contacts (03 Oct 2022 19:58)          REVIEW OF SYSTEMS  Constitutional:   No fever, no fatigue, no pallor, no night sweats, no weight loss.  HEENT:   No eye pain, no vision changes, no icterus, no mouth ulcers.  Respiratory:   No shortness of breath, no cough, no respiratory distress.   Cardiovascular:   No chest pain, no palpitations.   Gastrointestinal: No abdominal pain, no nausea, no vomiting , no diahrrea, no constipation, no hematochezia,no melena.  Skin:   No rashes, no jaundice, no eczema.   Musculoskeletal:   No joint pain, no swelling, no myalgia.   Neurologic:   No headache, no seizure, no weakness.   Genitourinary:   No dysuria, no decreased urine output.  Psychiatric:  No depression, no anxiety,   Endocrine:   No thyroid disease, no diabetes.  Heme/Lymphatic:   No anemia, no blood transfusions, no lymph node enlargement, no bleeding, no bruising.  ___________________________________________________________________________________________  Allergies    fish (Rash)  liver (Anaphylaxis)  No Known Drug Allergies    Intolerances      MEDICATIONS  (STANDING):  ALBUTerol    90 MICROgram(s) HFA Inhaler 2 Puff(s) Inhalation every 6 hours  amLODIPine   Tablet 10 milliGRAM(s) Oral daily  aspirin  chewable 81 milliGRAM(s) Oral daily  budesonide 160 MICROgram(s)/formoterol 4.5 MICROgram(s) Inhaler 2 Puff(s) Inhalation two times a day  calcium carbonate    500 mG (Tums) Chewable 1 Tablet(s) Chew two times a day  dextrose 5%. 1000 milliLiter(s) (50 mL/Hr) IV Continuous <Continuous>  dextrose 5%. 1000 milliLiter(s) (100 mL/Hr) IV Continuous <Continuous>  dextrose 50% Injectable 25 Gram(s) IV Push once  dextrose 50% Injectable 12.5 Gram(s) IV Push once  dextrose 50% Injectable 25 Gram(s) IV Push once  diphenhydrAMINE 25 milliGRAM(s) Oral daily  epoetin beverley-epbx (RETACRIT) Injectable 65740 Unit(s) IV Push <User Schedule>  folic acid 1 milliGRAM(s) Oral daily  glucagon  Injectable 1 milliGRAM(s) IntraMuscular once  hydrALAZINE 25 milliGRAM(s) Oral three times a day  insulin lispro (ADMELOG) corrective regimen sliding scale   SubCutaneous three times a day before meals  insulin lispro (ADMELOG) corrective regimen sliding scale   SubCutaneous at bedtime  levothyroxine 25 MICROGram(s) Oral daily  lidocaine   4% Patch 1 Patch Transdermal daily  metoclopramide 10 milliGRAM(s) Oral three times a day  metoprolol succinate ER 25 milliGRAM(s) Oral daily  oxyCODONE    IR 5 milliGRAM(s) Oral three times a day  pantoprazole  Injectable 40 milliGRAM(s) IV Push every 24 hours  sodium zirconium cyclosilicate 5 Gram(s) Oral once    MEDICATIONS  (PRN):  acetaminophen     Tablet .. 650 milliGRAM(s) Oral every 6 hours PRN Temp greater or equal to 38C (100.4F), Mild Pain (1 - 3)  aluminum hydroxide/magnesium hydroxide/simethicone Suspension 30 milliLiter(s) Oral every 4 hours PRN Dyspepsia  dextrose Oral Gel 15 Gram(s) Oral once PRN Blood Glucose LESS THAN 70 milliGRAM(s)/deciliter  melatonin 3 milliGRAM(s) Oral at bedtime PRN Insomnia  ondansetron Injectable 4 milliGRAM(s) IV Push every 8 hours PRN Nausea and/or Vomiting      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      Osteoarthritis      Vision loss of left eye  blind      ESRD on hemodialysis      Cataract  both eyes - hx of sx done      BPH (benign prostatic hyperplasia)      UTI (urinary tract infection)  hx of      Bladder mass  hx of      H/O hematuria      Osteoporosis      Vision loss of right eye  decreased      Depression      Chronic GERD      Osteomyelitis of vertebra      CHF (congestive heart failure)      Below knee amputation status, left  2012- pt is wearing prostesis      History of right cataract extraction      History of left cataract extraction      S/P arteriovenous (AV) fistula creation  right arm brachiocephalic arteriovenous fistula on 2018      H/O hematuria  s/p bladder bx and fulguration 2020      H/O transurethral destruction of bladder lesion        History of excision of mass  back mass on 2021        FAMILY HISTORY:  Family history of cirrhosis of liver (Mother)    Family history of renal failure (Sibling)    Family history of hypertension (Sibling)    Family history of diabetes mellitus (Sibling)    History of substance abuse in sibling (Sibling)      Social History: No hsitory of : Tobacco use, IVDA, EToH  ______________________________________________________________________________________    PHYSICAL EXAM    Daily     Daily Weight in k.4 (04 Oct 2022 13:38)  BMI: 18.9 (10- @ 12:06)  Change in Weight:  Vital Signs Last 24 Hrs  T(C): 37.3 (04 Oct 2022 13:38), Max: 37.4 (04 Oct 2022 11:43)  T(F): 99.1 (04 Oct 2022 13:38), Max: 99.3 (04 Oct 2022 11:43)  HR: 87 (04 Oct 2022 13:38) (85 - 102)  BP: 128/78 (04 Oct 2022 13:38) (128/78 - 175/88)  BP(mean): --  RR: 18 (04 Oct 2022 13:38) (18 - 20)  SpO2: 97% (04 Oct 2022 13:38) (88% - 100%)    Parameters below as of 04 Oct 2022 13:38  Patient On (Oxygen Delivery Method): room air        General:  Well developed, well nourished, alert and active, no pallor, NAD.  HEENT:    Normal appearance of conjunctiva, ears, nose, lips, oropharynx, and oral mucosa, anicteric.  Neck:  No masses, no asymmetry.  Lymph Nodes:  No lymphadenopathy.   Cardiovascular:  RRR normal S1/S2, no murmur.  Respiratory:  CTA B/L, normal respiratory effort.   Abdominal:   soft, no masses or tenderness, normoactive BS, NT/ND, no HSM.  Extremities:   No clubbing or cyanosis, normal capillary refill, no edema.   Skin:   No rash, jaundice, lesions, eczema.   Musculoskeletal:  No joint swelling, erythema or tenderness.   Neuro: No focal deficits.   Other:   _______________________________________________________________________________________________  Lab Results:                          8.6    2.54  )-----------( 78       ( 04 Oct 2022 11:35 )             27.1     10-04    134<L>  |  94<L>  |  61<H>  ----------------------------<  134<H>  6.2<HH>   |  31  |  14.00<H>    Ca    8.5      04 Oct 2022 11:35  Phos  6.5     10-04  Mg     3.4     10-04    TPro  6.7  /  Alb  3.2<L>  /  TBili  0.7  /  DBili  x   /  AST  24  /  ALT  16  /  AlkPhos  76  10-03    LIVER FUNCTIONS - ( 03 Oct 2022 16:30 )  Alb: 3.2 g/dL / Pro: 6.7 g/dL / ALK PHOS: 76 U/L / ALT: 16 U/L DA / AST: 24 U/L / GGT: x           PT/INR - ( 03 Oct 2022 16:30 )   PT: 11.8 sec;   INR: 0.99 ratio         PTT - ( 03 Oct 2022 16:30 )  PTT:29.7 sec  Triglycerides, Serum: 158 mg/dL (10-04 @ 11:35)        Stool Results:          RADIOLOGY RESULTS:  _______________________________________________________________________        
[facility] : Progress Note :: [phone], (office [phone]),   Dr Mosher / Dr Washington / Dr Young / Dr Doll / Dr Varsha TURCIOS / Dr Tello / Dr Garcia / Dr Larry qiu  _____________________________________________________________________________________________  Patient is a 62y Male whom presented to the hospital with hematemesis and SOB. Has ESRD non-compliant with HD.  in ED noted with hyperkalemia and SOB  renal consulted for ESRD.  he is s/p HD earlier in the day  feels better already     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      Osteoarthritis      Vision loss of left eye  blind      ESRD on hemodialysis      Cataract  both eyes - hx of sx done      BPH (benign prostatic hyperplasia)      UTI (urinary tract infection)  hx of      Bladder mass  hx of      H/O hematuria      Osteoporosis      Vision loss of right eye  decreased      Depression      Chronic GERD      Osteomyelitis of vertebra      CHF (congestive heart failure)      Below knee amputation status, left  2012- pt is wearing prostesis      History of right cataract extraction      History of left cataract extraction      S/P arteriovenous (AV) fistula creation  right arm brachiocephalic arteriovenous fistula on 11/08/2018      H/O hematuria  s/p bladder bx and fulguration 2/25/2020      H/O transurethral destruction of bladder lesion  2020      History of excision of mass  back mass on 03/31/2021        fish (Rash)  liver (Anaphylaxis)  No Known Drug Allergies    Home Medications Reviewed  Hospital Medications:   MEDICATIONS  (STANDING):  ALBUTerol    90 MICROgram(s) HFA Inhaler 2 Puff(s) Inhalation every 6 hours  amLODIPine   Tablet 10 milliGRAM(s) Oral daily  aspirin  chewable 81 milliGRAM(s) Oral daily  budesonide 160 MICROgram(s)/formoterol 4.5 MICROgram(s) Inhaler 2 Puff(s) Inhalation two times a day  calcium carbonate    500 mG (Tums) Chewable 1 Tablet(s) Chew two times a day  dextrose 5%. 1000 milliLiter(s) (50 mL/Hr) IV Continuous <Continuous>  dextrose 5%. 1000 milliLiter(s) (100 mL/Hr) IV Continuous <Continuous>  dextrose 50% Injectable 25 Gram(s) IV Push once  dextrose 50% Injectable 12.5 Gram(s) IV Push once  dextrose 50% Injectable 25 Gram(s) IV Push once  diphenhydrAMINE 25 milliGRAM(s) Oral daily  epoetin beverley-epbx (RETACRIT) Injectable 48774 Unit(s) IV Push <User Schedule>  folic acid 1 milliGRAM(s) Oral daily  glucagon  Injectable 1 milliGRAM(s) IntraMuscular once  hydrALAZINE 25 milliGRAM(s) Oral three times a day  insulin lispro (ADMELOG) corrective regimen sliding scale   SubCutaneous three times a day before meals  insulin lispro (ADMELOG) corrective regimen sliding scale   SubCutaneous at bedtime  levothyroxine 25 MICROGram(s) Oral daily  lidocaine   4% Patch 1 Patch Transdermal daily  metoclopramide 10 milliGRAM(s) Oral three times a day  metoprolol succinate ER 25 milliGRAM(s) Oral daily  oxyCODONE    IR 5 milliGRAM(s) Oral three times a day  pantoprazole    Tablet 40 milliGRAM(s) Oral two times a day    SOCIAL HISTORY:  Denies ETOh,Smoking,   FAMILY HISTORY:  Family history of cirrhosis of liver (Mother)    Family history of renal failure (Sibling)    Family history of hypertension (Sibling)    Family history of diabetes mellitus (Sibling)    History of substance abuse in sibling (Sibling)          VITALS:  T(F): 98.9 (10-04-22 @ 15:40), Max: 99.3 (10-04-22 @ 11:43)  HR: 96 (10-04-22 @ 15:40)  BP: 156/82 (10-04-22 @ 15:40)  RR: 18 (10-04-22 @ 15:40)  SpO2: 97% (10-04-22 @ 13:38)  Wt(kg): --    10-04 @ 07:01  -  10-04 @ 18:37  --------------------------------------------------------  IN: 400 mL / OUT: 2330 mL / NET: -1930 mL        PHYSICAL EXAM:  Constitutional: NAD  HEENT: anicteric sclera, oropharynx clear.  Neck: No JVD  Respiratory: CTAB, no wheezes, rales or rhonchi  Cardiovascular: S1, S2, RRR  Gastrointestinal: BS+, soft, NT/ND  Extremities: No peripheral edema  Neurological: A/O x 3, no focal deficits  Vascular Access: AVF with thrill  and bruit     LABS:  10-04    134<L>  |  94<L>  |  61<H>  ----------------------------<  134<H>  6.2<HH>   |  31  |  14.00<H>    Ca    8.5      04 Oct 2022 11:35  Phos  6.5     10-04  Mg     3.4     10-04    TPro  6.7  /  Alb  3.2<L>  /  TBili  0.7  /  DBili      /  AST  24  /  ALT  16  /  AlkPhos  76  10-03    Creatinine Trend: 14.00 <--, 12.00 <--                        8.6    2.54  )-----------( 78       ( 04 Oct 2022 11:35 )             27.1     Urine Studies:      RADIOLOGY & ADDITIONAL STUDIES:

## 2022-10-04 NOTE — PROGRESS NOTE ADULT - SUBJECTIVE AND OBJECTIVE BOX
NP Note discussed with  primary attending    Patient is a 62y old  Male who presents with a chief complaint of Hematemesis (04 Oct 2022 10:24)      INTERVAL HPI/OVERNIGHT EVENTS: no new complaints    MEDICATIONS  (STANDING):  ALBUTerol    90 MICROgram(s) HFA Inhaler 2 Puff(s) Inhalation every 6 hours  amLODIPine   Tablet 10 milliGRAM(s) Oral daily  aspirin  chewable 81 milliGRAM(s) Oral daily  budesonide 160 MICROgram(s)/formoterol 4.5 MICROgram(s) Inhaler 2 Puff(s) Inhalation two times a day  calcium carbonate    500 mG (Tums) Chewable 1 Tablet(s) Chew two times a day  dextrose 5%. 1000 milliLiter(s) (50 mL/Hr) IV Continuous <Continuous>  dextrose 5%. 1000 milliLiter(s) (100 mL/Hr) IV Continuous <Continuous>  dextrose 50% Injectable 25 Gram(s) IV Push once  dextrose 50% Injectable 12.5 Gram(s) IV Push once  dextrose 50% Injectable 25 Gram(s) IV Push once  diphenhydrAMINE 25 milliGRAM(s) Oral daily  epoetin beverley-epbx (RETACRIT) Injectable 94128 Unit(s) IV Push <User Schedule>  folic acid 1 milliGRAM(s) Oral daily  glucagon  Injectable 1 milliGRAM(s) IntraMuscular once  hydrALAZINE 25 milliGRAM(s) Oral three times a day  insulin lispro (ADMELOG) corrective regimen sliding scale   SubCutaneous three times a day before meals  insulin lispro (ADMELOG) corrective regimen sliding scale   SubCutaneous at bedtime  levothyroxine 25 MICROGram(s) Oral daily  lidocaine   4% Patch 1 Patch Transdermal daily  metoclopramide 10 milliGRAM(s) Oral three times a day  metoprolol succinate ER 25 milliGRAM(s) Oral daily  oxyCODONE    IR 5 milliGRAM(s) Oral three times a day  pantoprazole  Injectable 40 milliGRAM(s) IV Push every 24 hours    MEDICATIONS  (PRN):  acetaminophen     Tablet .. 650 milliGRAM(s) Oral every 6 hours PRN Temp greater or equal to 38C (100.4F), Mild Pain (1 - 3)  aluminum hydroxide/magnesium hydroxide/simethicone Suspension 30 milliLiter(s) Oral every 4 hours PRN Dyspepsia  dextrose Oral Gel 15 Gram(s) Oral once PRN Blood Glucose LESS THAN 70 milliGRAM(s)/deciliter  melatonin 3 milliGRAM(s) Oral at bedtime PRN Insomnia  ondansetron Injectable 4 milliGRAM(s) IV Push every 8 hours PRN Nausea and/or Vomiting      __________________________________________________  REVIEW OF SYSTEMS:    CONSTITUTIONAL: No fever,   EYES: no acute visual disturbances  NECK: No pain or stiffness  RESPIRATORY: No cough; No shortness of breath  CARDIOVASCULAR: No chest pain, no palpitations  GASTROINTESTINAL: No pain. No nausea or vomiting; No diarrhea   NEUROLOGICAL: No headache or numbness, no tremors  MUSCULOSKELETAL: No joint pain, no muscle pain  GENITOURINARY: no dysuria, no frequency, no hesitancy  PSYCHIATRY: no depression , no anxiety  ALL OTHER  ROS negative        Vital Signs Last 24 Hrs  T(C): 36.7 (04 Oct 2022 11:00), Max: 36.9 (03 Oct 2022 22:55)  T(F): 98 (04 Oct 2022 11:00), Max: 98.4 (03 Oct 2022 22:55)  HR: 97 (04 Oct 2022 11:00) (90 - 102)  BP: 148/80 (04 Oct 2022 11:00) (145/73 - 175/88)  BP(mean): --  RR: 18 (04 Oct 2022 11:00) (18 - 20)  SpO2: 96% (04 Oct 2022 11:00) (88% - 100%)    Parameters below as of 04 Oct 2022 11:00  Patient On (Oxygen Delivery Method): mask, nonrebreather        ________________________________________________  PHYSICAL EXAM:  GENERAL: NAD  HEENT: Normocephalic;  conjunctivae and sclerae clear; moist mucous membranes;   NECK : supple  CHEST/LUNG: Clear to ausculitation bilaterally with good air entry   HEART: S1 S2  regular; no murmurs, gallops or rubs  ABDOMEN: Soft, Nontender, Nondistended; Bowel sounds present  EXTREMITIES: Left BKA,  SKIN: warm and dry; no rash  NERVOUS SYSTEM:  Awake and alert; Oriented  to place, person and time ; no new deficits    _________________________________________________  LABS:                        8.6    2.54  )-----------( 78       ( 04 Oct 2022 11:35 )             27.1     10-04    x   |  x   |  x   ----------------------------<  x   x    |  x   |  14.00<H>    Ca    8.5      03 Oct 2022 16:30  Phos  6.5     10-04  Mg     3.4     10-04    TPro  6.7  /  Alb  3.2<L>  /  TBili  0.7  /  DBili  x   /  AST  24  /  ALT  16  /  AlkPhos  76  10-03    PT/INR - ( 03 Oct 2022 16:30 )   PT: 11.8 sec;   INR: 0.99 ratio         PTT - ( 03 Oct 2022 16:30 )  PTT:29.7 sec    CAPILLARY BLOOD GLUCOSE            RADIOLOGY & ADDITIONAL TESTS:  < from: Xray Chest 1 View- PORTABLE-Urgent (Xray Chest 1 View- PORTABLE-Urgent .) (10.03.22 @ 14:45) >  Heart possibly enlarged.    Slightly prominent interstitial vascular markings in the upper lung field   suggests a slight central CHF which has increased from April 27 of this   year.    Right axillary stent again noted.    IMPRESSION: There is rather mild CHF at this time. Other findings stable.    --- End of Report ---      < end of copied text >    Imaging Personally Reviewed:  YES    Consultant(s) Notes Reviewed:   YES    Care Discussed with Consultants :     Plan of care was discussed with patient and /or primary care giver; all questions and concerns were addressed and care was aligned with patient's wishes.

## 2022-10-04 NOTE — PROGRESS NOTE ADULT - PROBLEM SELECTOR PLAN 2
- Pt presented with hyperkalemia - 6.0  - s/p lokelma   - monitor BMP  - Keep on Telemetry - Pt presented with hyperkalemia - 6.0  - Potassium 6.3 after HD today  -  lokelma today  - monitor BMP  - Keep on Telemetry

## 2022-10-04 NOTE — CONSULT NOTE ADULT - NS ATTEND AMEND GEN_ALL_CORE FT
- Coffee ground emesis.  - GI bleed.  - Anemia.    Patient seen and examined. Refusing IV access. Refusing endoscopy (history of refusal in the past as well). HD stable. Tolerating liquid diet. Patient aware of risks of refusal of care. Recommend PPI BID. Monitor for any signs of recurring GI bleeding.

## 2022-10-04 NOTE — PROGRESS NOTE ADULT - PROBLEM SELECTOR PLAN 1
p/w coffee ground emesis x2.  protonix 40 bid  Monitor CBC   HB goal >7  EGD 08/22- Aborted due to presence of food in stomach.   GI Dr Lee consulted.

## 2022-10-04 NOTE — PROGRESS NOTE ADULT - PROBLEM SELECTOR PLAN 5
h/o DM  f/u A1c  On lantus 4 units BID  c/w  sliding scale  Adjust insulin as indicated  FS ACHS q6 while NPO

## 2022-10-04 NOTE — CONSULT NOTE ADULT - ASSESSMENT
# ESRD. s/p HD against a low K+ bath. He did not complete HD treatment.  compliance with HD advised.  cont HD on  TTS schedule  # anemia of CKD. admitted with hematemesis also.  epogen on HD  Gastrologist consult  # HTN. improved with UF     thx for the consult

## 2022-10-04 NOTE — CONSULT NOTE ADULT - ASSESSMENT
62M, coming from [facility], with PMHx HTN, CAD, HLD, DM, GERD, ESRD on HD (T/Th/S) and anemia, and adrenal insuffienciency sent for coffee-ground emesis x3d. Admitted in April 2022 for similar episode, at that time was scheduled for inpatient EGD but patient refused. Per chart review,  note to have multiple admissions for upper GIB and refusing hospital recommendations for diagnostic EGD/Colonoscopy. Noted to have EGD/ Colonoscopy 8/3/22 @ [facility] which were aborted d/t presence of food in the stomach and poor bowel prep.   Admitted for upper GIB and hematemesis. GI consulted.       Impression:      Recommendations:  c/w Protonix 40 BID  Monitor CBC q12, maintain active T&S q72h  Transfuse for Hgb goal >7  EGD/ Colonoscopy        62M, coming from [facility], with PMHx HTN, CAD, HLD, DM, GERD, ESRD on HD (T/Th/S) and anemia, and adrenal insuffienciency sent for coffee-ground emesis x3d. Admitted in April 2022 for similar episode, at that time was scheduled for inpatient EGD but patient refused. Per chart review,  note to have multiple admissions for upper GIB and refusing hospital recommendations for diagnostic EGD/Colonoscopy. Noted to have EGD/ Colonoscopy 8/3/22 @ [facility] which were aborted d/t presence of food in the stomach and poor bowel prep.   Admitted for upper GIB and coffee ground emesis. GI consulted.       Impression:      Recommendations:  c/w Protonix 40 BID  Monitor CBC q12, maintain active T&S q72h  Transfuse for Hgb goal >7  Patient refusing endoscopy and colonoscopy      62M, coming from [facility], with PMHx HTN, CAD, HLD, DM, GERD, ESRD on HD (T/Th/S) and anemia, and adrenal insuffienciency sent for coffee-ground emesis x3d. Admitted in April 2022 for similar episode, at that time was scheduled for inpatient EGD but patient refused. Per chart review,  note to have multiple admissions for upper GIB and refusing hospital recommendations for diagnostic EGD/Colonoscopy. Noted to have EGD/ Colonoscopy 8/3/22 @ [facility] which were aborted d/t presence of food in the stomach and poor bowel prep.   Admitted for upper GIB and hematemesis. GI consulted.     Patient tolerating clear liquid diet, denies abdominal pain or any reoccurrence of vomiting.  Patient refusing to be examined, stating that he wants to go home and be discharged.    Impression:  Hematemesis, appears to be resolving   Anemia, noted to downtrend  Incomplete EGD/Colonoscopy 8/3/22 due to food in the stomach and poor bowel prep, suggesting possible delay gastric emptying.    Recommendations:  Advance diet as tolerated.  c/w Protonix 40 BID, change to PO as patient does not have an IV  Monitor CBC, maintain active T&S q72h  Transfuse for Hgb goal >7  Patient refusing endoscopy and colonoscopy   Outpatient evaluation of EGD and Gastric emptying study if patient is agreeable to eval outlet obstruction vs neurologic etiology    Thank you for the consult, will continue to monitor while inpatient.

## 2022-10-05 ENCOUNTER — TRANSCRIPTION ENCOUNTER (OUTPATIENT)
Age: 62
End: 2022-10-05

## 2022-10-05 VITALS
OXYGEN SATURATION: 93 % | SYSTOLIC BLOOD PRESSURE: 139 MMHG | TEMPERATURE: 99 F | RESPIRATION RATE: 18 BRPM | HEART RATE: 80 BPM | DIASTOLIC BLOOD PRESSURE: 72 MMHG

## 2022-10-05 LAB
A1C WITH ESTIMATED AVERAGE GLUCOSE RESULT: 5.2 % — SIGNIFICANT CHANGE UP (ref 4–5.6)
ANION GAP SERPL CALC-SCNC: 4 MMOL/L — LOW (ref 5–17)
BUN SERPL-MCNC: 34 MG/DL — HIGH (ref 7–18)
CALCIUM SERPL-MCNC: 8.4 MG/DL — SIGNIFICANT CHANGE UP (ref 8.4–10.5)
CHLORIDE SERPL-SCNC: 96 MMOL/L — SIGNIFICANT CHANGE UP (ref 96–108)
CO2 SERPL-SCNC: 33 MMOL/L — HIGH (ref 22–31)
CREAT SERPL-MCNC: 9.76 MG/DL — HIGH (ref 0.5–1.3)
EGFR: 6 ML/MIN/1.73M2 — LOW
ESTIMATED AVERAGE GLUCOSE: 103 MG/DL — SIGNIFICANT CHANGE UP (ref 68–114)
GLUCOSE BLDC GLUCOMTR-MCNC: 104 MG/DL — HIGH (ref 70–99)
GLUCOSE BLDC GLUCOMTR-MCNC: 156 MG/DL — HIGH (ref 70–99)
GLUCOSE SERPL-MCNC: 105 MG/DL — HIGH (ref 70–99)
HBV SURFACE AB SER-ACNC: ABNORMAL
HBV SURFACE AG SER-ACNC: SIGNIFICANT CHANGE UP
HCT VFR BLD CALC: 28.2 % — LOW (ref 39–50)
HGB BLD-MCNC: 9 G/DL — LOW (ref 13–17)
MCHC RBC-ENTMCNC: 30.3 PG — SIGNIFICANT CHANGE UP (ref 27–34)
MCHC RBC-ENTMCNC: 31.9 GM/DL — LOW (ref 32–36)
MCV RBC AUTO: 94.9 FL — SIGNIFICANT CHANGE UP (ref 80–100)
NRBC # BLD: 0 /100 WBCS — SIGNIFICANT CHANGE UP (ref 0–0)
PLATELET # BLD AUTO: 69 K/UL — LOW (ref 150–400)
POTASSIUM SERPL-MCNC: 5 MMOL/L — SIGNIFICANT CHANGE UP (ref 3.5–5.3)
POTASSIUM SERPL-SCNC: 5 MMOL/L — SIGNIFICANT CHANGE UP (ref 3.5–5.3)
RBC # BLD: 2.97 M/UL — LOW (ref 4.2–5.8)
RBC # FLD: 18.1 % — HIGH (ref 10.3–14.5)
SARS-COV-2 RNA SPEC QL NAA+PROBE: SIGNIFICANT CHANGE UP
SODIUM SERPL-SCNC: 133 MMOL/L — LOW (ref 135–145)
WBC # BLD: 3.21 K/UL — LOW (ref 3.8–10.5)
WBC # FLD AUTO: 3.21 K/UL — LOW (ref 3.8–10.5)

## 2022-10-05 PROCEDURE — 80061 LIPID PANEL: CPT

## 2022-10-05 PROCEDURE — 84100 ASSAY OF PHOSPHORUS: CPT

## 2022-10-05 PROCEDURE — 85027 COMPLETE CBC AUTOMATED: CPT

## 2022-10-05 PROCEDURE — U0003: CPT

## 2022-10-05 PROCEDURE — 85025 COMPLETE CBC W/AUTO DIFF WBC: CPT

## 2022-10-05 PROCEDURE — 99232 SBSQ HOSP IP/OBS MODERATE 35: CPT

## 2022-10-05 PROCEDURE — 83735 ASSAY OF MAGNESIUM: CPT

## 2022-10-05 PROCEDURE — 36415 COLL VENOUS BLD VENIPUNCTURE: CPT

## 2022-10-05 PROCEDURE — 86901 BLOOD TYPING SEROLOGIC RH(D): CPT

## 2022-10-05 PROCEDURE — 94640 AIRWAY INHALATION TREATMENT: CPT

## 2022-10-05 PROCEDURE — 87635 SARS-COV-2 COVID-19 AMP PRB: CPT

## 2022-10-05 PROCEDURE — 85610 PROTHROMBIN TIME: CPT

## 2022-10-05 PROCEDURE — 83970 ASSAY OF PARATHORMONE: CPT

## 2022-10-05 PROCEDURE — 85730 THROMBOPLASTIN TIME PARTIAL: CPT

## 2022-10-05 PROCEDURE — U0005: CPT

## 2022-10-05 PROCEDURE — 99261: CPT

## 2022-10-05 PROCEDURE — 86850 RBC ANTIBODY SCREEN: CPT

## 2022-10-05 PROCEDURE — 93005 ELECTROCARDIOGRAM TRACING: CPT

## 2022-10-05 PROCEDURE — 86706 HEP B SURFACE ANTIBODY: CPT

## 2022-10-05 PROCEDURE — 71045 X-RAY EXAM CHEST 1 VIEW: CPT

## 2022-10-05 PROCEDURE — 82962 GLUCOSE BLOOD TEST: CPT

## 2022-10-05 PROCEDURE — 86900 BLOOD TYPING SEROLOGIC ABO: CPT

## 2022-10-05 PROCEDURE — 80053 COMPREHEN METABOLIC PANEL: CPT

## 2022-10-05 PROCEDURE — 80048 BASIC METABOLIC PNL TOTAL CA: CPT

## 2022-10-05 PROCEDURE — 83036 HEMOGLOBIN GLYCOSYLATED A1C: CPT

## 2022-10-05 PROCEDURE — 82310 ASSAY OF CALCIUM: CPT

## 2022-10-05 PROCEDURE — 99285 EMERGENCY DEPT VISIT HI MDM: CPT

## 2022-10-05 PROCEDURE — 87340 HEPATITIS B SURFACE AG IA: CPT

## 2022-10-05 RX ORDER — SUCRALFATE 1 G
10 TABLET ORAL
Qty: 0 | Refills: 0 | DISCHARGE
Start: 2022-10-05

## 2022-10-05 RX ADMIN — AMLODIPINE BESYLATE 10 MILLIGRAM(S): 2.5 TABLET ORAL at 05:55

## 2022-10-05 RX ADMIN — Medication 25 MICROGRAM(S): at 05:55

## 2022-10-05 RX ADMIN — PANTOPRAZOLE SODIUM 40 MILLIGRAM(S): 20 TABLET, DELAYED RELEASE ORAL at 05:56

## 2022-10-05 RX ADMIN — Medication 25 MILLIGRAM(S): at 05:55

## 2022-10-05 RX ADMIN — Medication 1 GRAM(S): at 05:57

## 2022-10-05 RX ADMIN — Medication 10 MILLIGRAM(S): at 05:55

## 2022-10-05 RX ADMIN — OXYCODONE HYDROCHLORIDE 5 MILLIGRAM(S): 5 TABLET ORAL at 05:56

## 2022-10-05 RX ADMIN — Medication 25 MILLIGRAM(S): at 05:56

## 2022-10-05 RX ADMIN — Medication 1 TABLET(S): at 05:55

## 2022-10-05 NOTE — PROGRESS NOTE ADULT - ASSESSMENT
62M, coming from [facility], with PMHx HTN, CAD, HLD, DM, GERD, ESRD on HD (T/Th/S) and anemia, and adrenal insuffienciency sent for coffee-ground emesis x3d. Admitted in April 2022 for similar episode, at that time was scheduled for inpatient EGD but patient refused. Per chart review,  note to have multiple admissions for upper GIB and refusing hospital recommendations for diagnostic EGD/Colonoscopy. Noted to have EGD/ Colonoscopy 8/3/22 @ [facility] which were aborted d/t presence of food in the stomach and poor bowel prep.   Admitted for upper GIB and hematemesis. GI consulted.     Patient tolerating regular diet, denies abdominal pain or any reoccurrence of vomiting.  Planned for discharge today back to [facility].     Impression:  Hematemesis, no observed reoccurrence since admitted to the floors   Anemia, remained stable   Incomplete EGD/Colonoscopy 8/3/22 due to food in the stomach and poor bowel prep, suggesting possible delay gastric emptying.    Recommendations:  Advance diet as tolerated.  c/w Protonix 40 BID  Monitor CBC  Patient refusing endoscopy and colonoscopy, recommend repeat study when agreeable  Outpatient evaluation of EGD and Gastric emptying study if patient is agreeable to eval outlet obstruction vs neurologic etiology  Planned for DC today.     Thank you for the consult, Will sign off as the patient is scheduled for DC back to assisted living.

## 2022-10-05 NOTE — DISCHARGE NOTE NURSING/CASE MANAGEMENT/SOCIAL WORK - NSDCPEFALRISK_GEN_ALL_CORE
For information on Fall & Injury Prevention, visit: [email or web address] OR  [email or web address] OR  https://www.cdc.gov/steadi/patient.html

## 2022-10-05 NOTE — PROGRESS NOTE ADULT - NS ATTEND AMEND GEN_ALL_CORE FT
- N/v.  - Coffee ground emesis.  - Anemia.    Patient doing well today. No further n/v. Wishes to leave hospital. PPI BID. Expressed importance of outpatient GI f/u. Please reconsult GI PRN.

## 2022-10-05 NOTE — DISCHARGE NOTE PROVIDER - CARE PROVIDER_API CALL
Dario De La Torre)  Medicine  [address], [address] [place] [address]  Phone: [phone]  Fax: [phone]  Follow Up Time: 1 week    Mike Lee)  Gastroenterology; Internal Medicine  [address] [facility] [address] [place] [address] [place] [address]  Phone: [phone]  Fax: [phone]  Follow Up Time: 2 weeks

## 2022-10-05 NOTE — PROGRESS NOTE ADULT - SUBJECTIVE AND OBJECTIVE BOX
[facility] : Progress Note :: [phone], (office [phone]),   Dr Mosher / Dr Washington / Dr Young / Dr Doll / Dr Varsha TURCIOS / Dr Tello / Dr Garcia / Dr Larry qiu  _____________________________________________________________________________________________    denies SOB     fish (Rash)  liver (Anaphylaxis)  No Known Drug Allergies    Hospital Medications:   MEDICATIONS  (STANDING):  ALBUTerol    90 MICROgram(s) HFA Inhaler 2 Puff(s) Inhalation every 6 hours  amLODIPine   Tablet 10 milliGRAM(s) Oral daily  aspirin  chewable 81 milliGRAM(s) Oral daily  budesonide 160 MICROgram(s)/formoterol 4.5 MICROgram(s) Inhaler 2 Puff(s) Inhalation two times a day  calcium carbonate    500 mG (Tums) Chewable 1 Tablet(s) Chew two times a day  dextrose 5%. 1000 milliLiter(s) (50 mL/Hr) IV Continuous <Continuous>  dextrose 5%. 1000 milliLiter(s) (100 mL/Hr) IV Continuous <Continuous>  dextrose 50% Injectable 25 Gram(s) IV Push once  dextrose 50% Injectable 12.5 Gram(s) IV Push once  dextrose 50% Injectable 25 Gram(s) IV Push once  diphenhydrAMINE 25 milliGRAM(s) Oral daily  epoetin beverley-epbx (RETACRIT) Injectable 07723 Unit(s) IV Push <User Schedule>  folic acid 1 milliGRAM(s) Oral daily  glucagon  Injectable 1 milliGRAM(s) IntraMuscular once  hydrALAZINE 25 milliGRAM(s) Oral three times a day  insulin lispro (ADMELOG) corrective regimen sliding scale   SubCutaneous three times a day before meals  insulin lispro (ADMELOG) corrective regimen sliding scale   SubCutaneous at bedtime  levothyroxine 25 MICROGram(s) Oral daily  lidocaine   4% Patch 1 Patch Transdermal daily  metoclopramide 10 milliGRAM(s) Oral three times a day  metoprolol succinate ER 25 milliGRAM(s) Oral daily  oxyCODONE    IR 5 milliGRAM(s) Oral three times a day  pantoprazole    Tablet 40 milliGRAM(s) Oral two times a day  sucralfate suspension 1 Gram(s) Oral four times a day      VITALS:  T(F): 98.8 (10-05-22 @ 11:03), Max: 99.1 (10-04-22 @ 13:38)  HR: 80 (10-05-22 @ 11:03)  BP: 139/72 (10-05-22 @ 11:03)  RR: 18 (10-05-22 @ 11:03)  SpO2: 93% (10-05-22 @ 11:03)  Wt(kg): --    10-04 @ 07:01  -  10-05 @ 07:00  --------------------------------------------------------  IN: 400 mL / OUT: 2330 mL / NET: -1930 mL        PHYSICAL EXAM:  Constitutional: NAD  HEENT: anicteric sclera, oropharynx clear.  Neck: No JVD  Respiratory: CTAB, no wheezes, rales or rhonchi  Cardiovascular: S1, S2, RRR  Gastrointestinal: BS+, soft, NT/ND  Extremities:  No peripheral edema  Neurological: A/O x 3, no focal deficits  Vascular Access: AVF with thrill  and bruit     LABS:  10-05    133<L>  |  96  |  34<H>  ----------------------------<  105<H>  5.0   |  33<H>  |  9.76<H>    Ca    8.4      05 Oct 2022 06:39  Phos  6.5     10-04  Mg     3.4     10-04    TPro  6.7  /  Alb  3.2<L>  /  TBili  0.7  /  DBili      /  AST  24  /  ALT  16  /  AlkPhos  76  10-03    Creatinine Trend: 9.76 <--, 14.00 <--, 12.00 <--                        9.0    3.21  )-----------( 69       ( 05 Oct 2022 06:39 )             28.2     Urine Studies:      RADIOLOGY & ADDITIONAL STUDIES:

## 2022-10-05 NOTE — DISCHARGE NOTE PROVIDER - PROVIDER TOKENS
PROVIDER:[TOKEN:[[id number]],FOLLOWUP:[1 week]],PROVIDER:[TOKEN:[[id number]],FOLLOWUP:[2 weeks]]

## 2022-10-05 NOTE — DISCHARGE NOTE PROVIDER - NSDCFUSCHEDAPPT_GEN_ALL_CORE_FT
[facility]  VASCULAR [address]  Scheduled Appointment: 10/17/2022    [facility]  VASCULAR [address]  Scheduled Appointment: 10/17/2022     Yosvany Mirza  [facility] Physician [facility]  ENDOVASCULAR [address]   Scheduled Appointment: 10/28/2022    [facility]  GASTRO OP [id number]  Scheduled Appointment: 11/17/2022

## 2022-10-05 NOTE — DISCHARGE NOTE PROVIDER - HOSPITAL COURSE
This is a 62 year old male, coming from [facility], with medical history of prior upper GI bleed, hypertension, anemia, CAD, hyperlipidemia, diabetes mellitus, L BKA, GERD and adrenal insuffiencey, ESRD on HD T,Th,S reports to the ED for ground coffee emesis. Pt was admitted in April for similar episode, at that time pt was scheduled for endoscopy but refused it on the day of. Patient admitted to tele for upper GI bleed & hematemesis. Made NPO. GI Dr Lee consulted. Patient refusing endoscopy (history of refusal in the past as well). Patient aware of risks of refusal of care. Recommend PPI BID. Monitor for any signs of recurring GI bleeding. Advanced diet as tolerated. Nephro Dr. Mosher consulted. patient was also noted with hyperkalemia, treated with Lokelma. S/P HD, potassium level normalized. Hemoglobin level slowly improving. Patient is free of coffee ground emesis, endorses feeling better. Patient's brother Fernando Laureano called  [phone] and made aware that pt is refusing Endoscopy as recommended by GI, rather he wants to follow up with out pt GI.   Given patient's improved clinical status and current hemodynamic stability, decision was made to discharge.  Please refer to patient's complete medical chart with documents for a full hospital course, for this is only a brief summary.

## 2022-10-05 NOTE — PROGRESS NOTE ADULT - ASSESSMENT
# ESRD. s/p HD yesterday against a low K+ bath. He did not complete HD treatment.  compliance with HD advised.  cont HD on  TTS schedule  # anemia of CKD. admitted with hematemesis also.  epogen on HD  Gastrologist consult  # HTN. improved with UF . cont norvasc, hydralazine  # CKDMBD- elevated phos. start sevelamer

## 2022-10-05 NOTE — PROGRESS NOTE ADULT - SUBJECTIVE AND OBJECTIVE BOX
Summary:   62y  Male      Subjective:   No overnight events. Patient tolerated clear liquid diet, tolerated advancing to regular diet this morning.     Objective:    MEDICATIONS  (STANDING):  ALBUTerol    90 MICROgram(s) HFA Inhaler 2 Puff(s) Inhalation every 6 hours  amLODIPine   Tablet 10 milliGRAM(s) Oral daily  aspirin  chewable 81 milliGRAM(s) Oral daily  budesonide 160 MICROgram(s)/formoterol 4.5 MICROgram(s) Inhaler 2 Puff(s) Inhalation two times a day  calcium carbonate    500 mG (Tums) Chewable 1 Tablet(s) Chew two times a day  dextrose 5%. 1000 milliLiter(s) (50 mL/Hr) IV Continuous <Continuous>  dextrose 5%. 1000 milliLiter(s) (100 mL/Hr) IV Continuous <Continuous>  dextrose 50% Injectable 25 Gram(s) IV Push once  dextrose 50% Injectable 12.5 Gram(s) IV Push once  dextrose 50% Injectable 25 Gram(s) IV Push once  diphenhydrAMINE 25 milliGRAM(s) Oral daily  epoetin beverley-epbx (RETACRIT) Injectable 12997 Unit(s) IV Push <User Schedule>  folic acid 1 milliGRAM(s) Oral daily  glucagon  Injectable 1 milliGRAM(s) IntraMuscular once  hydrALAZINE 25 milliGRAM(s) Oral three times a day  insulin lispro (ADMELOG) corrective regimen sliding scale   SubCutaneous three times a day before meals  insulin lispro (ADMELOG) corrective regimen sliding scale   SubCutaneous at bedtime  levothyroxine 25 MICROGram(s) Oral daily  lidocaine   4% Patch 1 Patch Transdermal daily  metoclopramide 10 milliGRAM(s) Oral three times a day  metoprolol succinate ER 25 milliGRAM(s) Oral daily  oxyCODONE    IR 5 milliGRAM(s) Oral three times a day  pantoprazole    Tablet 40 milliGRAM(s) Oral two times a day  sucralfate suspension 1 Gram(s) Oral four times a day    MEDICATIONS  (PRN):  acetaminophen     Tablet .. 650 milliGRAM(s) Oral every 6 hours PRN Temp greater or equal to 38C (100.4F), Mild Pain (1 - 3)  aluminum hydroxide/magnesium hydroxide/simethicone Suspension 30 milliLiter(s) Oral every 4 hours PRN Dyspepsia  dextrose Oral Gel 15 Gram(s) Oral once PRN Blood Glucose LESS THAN 70 milliGRAM(s)/deciliter  melatonin 3 milliGRAM(s) Oral at bedtime PRN Insomnia  ondansetron Injectable 4 milliGRAM(s) IV Push every 8 hours PRN Nausea and/or Vomiting              Vital Signs Last 24 Hrs  T(C): 37.1 (05 Oct 2022 11:03), Max: 37.2 (04 Oct 2022 15:40)  T(F): 98.8 (05 Oct 2022 11:03), Max: 98.9 (04 Oct 2022 15:40)  HR: 80 (05 Oct 2022 11:03) (70 - 96)  BP: 139/72 (05 Oct 2022 11:03) (139/72 - 174/90)  BP(mean): --  RR: 18 (05 Oct 2022 11:03) (18 - 18)  SpO2: 93% (05 Oct 2022 11:03) (86% - 99%)    Parameters below as of 05 Oct 2022 11:03  Patient On (Oxygen Delivery Method): nasal cannula  O2 Flow (L/min): 2        General:  Well developed, well nourished, alert and active, no pallor, NAD.  HEENT:    Normal appearance of conjunctiva, ears, nose, lips, oropharynx, and oral mucosa, anicteric.  Neck:  No masses, no asymmetry.  Lymph Nodes:  No lymphadenopathy.   Cardiovascular:  RRR normal S1/S2, no murmur.  Respiratory:  CTA B/L, normal respiratory effort.   Abdominal:   soft, no masses or tenderness, normoactive BS, NT/ND, no HSM.  Extremities:   No clubbing or cyanosis, normal capillary refill, no edema.   Skin:   No rash, jaundice, lesions, eczema.   Musculoskeletal:  No joint swelling, erythema or tenderness.   Neuro: No focal deficits.   Other:       LABS:                        9.0    3.21  )-----------( 69       ( 05 Oct 2022 06:39 )             28.2     10-05    133<L>  |  96  |  34<H>  ----------------------------<  105<H>  5.0   |  33<H>  |  9.76<H>    Ca    8.4      05 Oct 2022 06:39  Phos  6.5     10-04  Mg     3.4     10-04    TPro  6.7  /  Alb  3.2<L>  /  TBili  0.7  /  DBili  x   /  AST  24  /  ALT  16  /  AlkPhos  76  10-03    PT/INR - ( 03 Oct 2022 16:30 )   PT: 11.8 sec;   INR: 0.99 ratio         PTT - ( 03 Oct 2022 16:30 )  PTT:29.7 sec      RADIOLOGY & ADDITIONAL TESTS:

## 2022-10-05 NOTE — DISCHARGE NOTE NURSING/CASE MANAGEMENT/SOCIAL WORK - PATIENT PORTAL LINK FT
You can access the FollowMyHealth Patient Portal offered by [facility] by registering at the following website: [email or web address]. By joining [email or web address] FollowMyHealth portal, you will also be able to view your health information using other applications (apps) compatible with our system.

## 2022-10-05 NOTE — DISCHARGE NOTE PROVIDER - NSDCMRMEDTOKEN_GEN_ALL_CORE_FT
aspirin 81 mg oral tablet, chewable: 1 tab(s) orally once a day  Benadryl Allergy 25 mg oral tablet: 1 tab(s) orally once a day  DuoNeb 0.5 mg-2.5 mg/3 mL inhalation solution: 3 milliliter(s) inhaled 3 times a day  ferrous sulfate 325 mg (65 mg elemental iron) oral tablet: 1 tab(s) orally once a day  folic acid 1 mg oral tablet: 1 tab(s) orally once a day  hydrALAZINE 25 mg oral tablet: 1 tab(s) orally 3 times a day  Levemir 100 units/mL subcutaneous solution: 4 unit(s) subcutaneous 2 times a day  lidocaine 5% patch: 1 patch subcutaneous 2 times a day  metoprolol succinate 25 mg oral tablet, extended release: 1 tab(s) orally once a day  Norvasc 10 mg oral tablet: 1 tab(s) orally once a day  oxyCODONE 5 mg oral tablet: 1 tab(s) orally 3 times a day  Pepcid 20 mg oral tablet: 1 tab(s) orally 2 times a day  Reglan 10 mg oral tablet: 1 tab(s) orally 3 times a day  sucralfate 1 g/10 mL oral suspension: 10 milliliter(s) orally 4 times a day  Symbicort 160 mcg-4.5 mcg/inh inhalation aerosol: 1 puff(s) inhaled 2 times a day  Synthroid 25 mcg (0.025 mg) oral tablet: 1 tab(s) orally once a day  Tums 500 mg oral tablet, chewable: 1 tab(s) orally 2 times a day

## 2022-10-07 PROBLEM — I50.9 HEART FAILURE, UNSPECIFIED: Chronic | Status: ACTIVE | Noted: 2022-10-03

## 2022-10-09 LAB — GLUCOSE BLDC GLUCOMTR-MCNC: 174 MG/DL — HIGH (ref 70–99)

## 2022-10-17 ENCOUNTER — APPOINTMENT (OUTPATIENT)
Dept: VASCULAR SURGERY | Facility: CLINIC | Age: 62
End: 2022-10-17

## 2022-10-17 VITALS
WEIGHT: 125 LBS | BODY MASS INDEX: 22.15 KG/M2 | HEART RATE: 91 BPM | SYSTOLIC BLOOD PRESSURE: 160 MMHG | HEIGHT: 63 IN | DIASTOLIC BLOOD PRESSURE: 81 MMHG

## 2022-10-17 PROCEDURE — 93990 DOPPLER FLOW TESTING: CPT

## 2022-10-17 PROCEDURE — 93880 EXTRACRANIAL BILAT STUDY: CPT

## 2022-10-17 PROCEDURE — 99213 OFFICE O/P EST LOW 20 MIN: CPT

## 2022-10-17 NOTE — PHYSICAL EXAM
[Thrill] : thrill [Pulsatile Thrill] : pulsatile thrill [Aneurysm] : no aneurysm [Bleeding] : no bleeding [Hand well perfused] : hand well perfused [Ulcer] : no ulcer [Normal] : coordination grossly intact, no focal deficits [de-identified] : Thinning over aneurysmal dilations no open wounds or ulcers

## 2022-10-17 NOTE — HISTORY OF PRESENT ILLNESS
[FreeTextEntry1] : 61 yo male with history of esrd on hd presents for follow up bleeding after dialysis in the distal puncture site

## 2022-10-17 NOTE — DISCUSSION/SUMMARY
[FreeTextEntry1] : 61 yo male with history of esrd on hd presents for follow up \par duplex shows patent avf with 50-75% stenosis of the  cephalic subclavian junction with flow rate of 1718\par \par Carotid duplex shows no evidence of stenosis\par \par Given patient's history of increased bleeding after dialysis we will plan for right upper extremity fistulogram

## 2022-10-20 NOTE — BEHAVIORAL HEALTH ASSESSMENT NOTE - RELATEDNESS

## 2022-10-28 ENCOUNTER — APPOINTMENT (OUTPATIENT)
Dept: ENDOVASCULAR SURGERY | Facility: CLINIC | Age: 62
End: 2022-10-28

## 2022-11-04 ENCOUNTER — APPOINTMENT (OUTPATIENT)
Dept: ENDOVASCULAR SURGERY | Facility: CLINIC | Age: 62
End: 2022-11-04

## 2022-11-04 ENCOUNTER — RESULT REVIEW (OUTPATIENT)
Age: 62
End: 2022-11-04

## 2022-11-04 VITALS
SYSTOLIC BLOOD PRESSURE: 146 MMHG | BODY MASS INDEX: 20.91 KG/M2 | DIASTOLIC BLOOD PRESSURE: 73 MMHG | WEIGHT: 118 LBS | HEART RATE: 87 BPM | TEMPERATURE: 98.3 F | RESPIRATION RATE: 18 BRPM | HEIGHT: 63 IN | OXYGEN SATURATION: 95 %

## 2022-11-04 PROCEDURE — 36902Z: CUSTOM

## 2022-11-04 PROCEDURE — 36907Z: CUSTOM | Mod: 59

## 2022-11-04 RX ORDER — INSULIN DETEMIR 100 [IU]/ML
INJECTION, SOLUTION SUBCUTANEOUS
Refills: 0 | Status: ACTIVE | COMMUNITY

## 2022-11-04 RX ORDER — HYDRALAZINE HYDROCHLORIDE 25 MG/1
25 TABLET ORAL
Refills: 0 | Status: ACTIVE | COMMUNITY

## 2022-11-04 NOTE — ASSESSMENT
[Other: _____] : [unfilled] [FreeTextEntry1] : prolonged bleeding with stenosis on duplex-plan for fistulogram and possible intervention

## 2022-11-04 NOTE — HISTORY OF PRESENT ILLNESS
[FreeTextEntry1] : creation 11-18-18 Dr Mason [FreeTextEntry4] : yesterday [FreeTextEntry5] : yesterday at 6pm  [FreeTextEntry6] : Dr Faith

## 2022-11-12 ENCOUNTER — INPATIENT (INPATIENT)
Facility: HOSPITAL | Age: 62
LOS: 1 days | Discharge: NURSING, REHAB OR LONG-TERM CARE | DRG: 177 | End: 2022-11-14
Attending: INTERNAL MEDICINE | Admitting: INTERNAL MEDICINE
Payer: MEDICAID

## 2022-11-12 VITALS
OXYGEN SATURATION: 96 % | TEMPERATURE: 98 F | SYSTOLIC BLOOD PRESSURE: 147 MMHG | HEART RATE: 95 BPM | HEIGHT: 66 IN | DIASTOLIC BLOOD PRESSURE: 68 MMHG | RESPIRATION RATE: 18 BRPM

## 2022-11-12 DIAGNOSIS — Z98.890 OTHER SPECIFIED POSTPROCEDURAL STATES: Chronic | ICD-10-CM

## 2022-11-12 DIAGNOSIS — D64.9 ANEMIA, UNSPECIFIED: ICD-10-CM

## 2022-11-12 DIAGNOSIS — E03.9 HYPOTHYROIDISM, UNSPECIFIED: ICD-10-CM

## 2022-11-12 DIAGNOSIS — U07.1 COVID-19: ICD-10-CM

## 2022-11-12 DIAGNOSIS — E11.9 TYPE 2 DIABETES MELLITUS WITHOUT COMPLICATIONS: ICD-10-CM

## 2022-11-12 DIAGNOSIS — Z98.42 CATARACT EXTRACTION STATUS, LEFT EYE: Chronic | ICD-10-CM

## 2022-11-12 DIAGNOSIS — Z98.41 CATARACT EXTRACTION STATUS, RIGHT EYE: Chronic | ICD-10-CM

## 2022-11-12 DIAGNOSIS — I10 ESSENTIAL (PRIMARY) HYPERTENSION: ICD-10-CM

## 2022-11-12 DIAGNOSIS — Z87.448 PERSONAL HISTORY OF OTHER DISEASES OF URINARY SYSTEM: Chronic | ICD-10-CM

## 2022-11-12 DIAGNOSIS — J96.01 ACUTE RESPIRATORY FAILURE WITH HYPOXIA: ICD-10-CM

## 2022-11-12 DIAGNOSIS — Z29.9 ENCOUNTER FOR PROPHYLACTIC MEASURES, UNSPECIFIED: ICD-10-CM

## 2022-11-12 DIAGNOSIS — N18.6 END STAGE RENAL DISEASE: ICD-10-CM

## 2022-11-12 DIAGNOSIS — Z89.512 ACQUIRED ABSENCE OF LEFT LEG BELOW KNEE: Chronic | ICD-10-CM

## 2022-11-12 LAB
ALBUMIN SERPL ELPH-MCNC: 3.3 G/DL — LOW (ref 3.5–5)
ALP SERPL-CCNC: 100 U/L — SIGNIFICANT CHANGE UP (ref 40–120)
ALT FLD-CCNC: 22 U/L DA — SIGNIFICANT CHANGE UP (ref 10–60)
ANION GAP SERPL CALC-SCNC: 11 MMOL/L — SIGNIFICANT CHANGE UP (ref 5–17)
ANISOCYTOSIS BLD QL: SLIGHT — SIGNIFICANT CHANGE UP
APTT BLD: 33.4 SEC — SIGNIFICANT CHANGE UP (ref 27.5–35.5)
AST SERPL-CCNC: 20 U/L — SIGNIFICANT CHANGE UP (ref 10–40)
BASOPHILS # BLD AUTO: 0.01 K/UL — SIGNIFICANT CHANGE UP (ref 0–0.2)
BASOPHILS # BLD AUTO: 0.01 K/UL — SIGNIFICANT CHANGE UP (ref 0–0.2)
BASOPHILS NFR BLD AUTO: 0.2 % — SIGNIFICANT CHANGE UP (ref 0–2)
BASOPHILS NFR BLD AUTO: 0.3 % — SIGNIFICANT CHANGE UP (ref 0–2)
BILIRUB SERPL-MCNC: 0.6 MG/DL — SIGNIFICANT CHANGE UP (ref 0.2–1.2)
BLD GP AB SCN SERPL QL: SIGNIFICANT CHANGE UP
BUN SERPL-MCNC: 52 MG/DL — HIGH (ref 7–18)
CALCIUM SERPL-MCNC: 8 MG/DL — LOW (ref 8.4–10.5)
CHLORIDE SERPL-SCNC: 101 MMOL/L — SIGNIFICANT CHANGE UP (ref 96–108)
CO2 SERPL-SCNC: 24 MMOL/L — SIGNIFICANT CHANGE UP (ref 22–31)
CREAT SERPL-MCNC: 12.9 MG/DL — HIGH (ref 0.5–1.3)
EGFR: 4 ML/MIN/1.73M2 — LOW
EOSINOPHIL # BLD AUTO: 0.12 K/UL — SIGNIFICANT CHANGE UP (ref 0–0.5)
EOSINOPHIL # BLD AUTO: 0.15 K/UL — SIGNIFICANT CHANGE UP (ref 0–0.5)
EOSINOPHIL NFR BLD AUTO: 2.6 % — SIGNIFICANT CHANGE UP (ref 0–6)
EOSINOPHIL NFR BLD AUTO: 4.8 % — SIGNIFICANT CHANGE UP (ref 0–6)
GLUCOSE SERPL-MCNC: 145 MG/DL — HIGH (ref 70–99)
HCT VFR BLD CALC: 22.4 % — LOW (ref 39–50)
HCT VFR BLD CALC: 24.8 % — LOW (ref 39–50)
HGB BLD-MCNC: 7.1 G/DL — LOW (ref 13–17)
HGB BLD-MCNC: 8 G/DL — LOW (ref 13–17)
HYPOCHROMIA BLD QL: SIGNIFICANT CHANGE UP
IMM GRANULOCYTES NFR BLD AUTO: 0.2 % — SIGNIFICANT CHANGE UP (ref 0–0.9)
IMM GRANULOCYTES NFR BLD AUTO: 0.3 % — SIGNIFICANT CHANGE UP (ref 0–0.9)
INR BLD: 1 RATIO — SIGNIFICANT CHANGE UP (ref 0.88–1.16)
LYMPHOCYTES # BLD AUTO: 0.35 K/UL — LOW (ref 1–3.3)
LYMPHOCYTES # BLD AUTO: 0.47 K/UL — LOW (ref 1–3.3)
LYMPHOCYTES # BLD AUTO: 14.9 % — SIGNIFICANT CHANGE UP (ref 13–44)
LYMPHOCYTES # BLD AUTO: 7.6 % — LOW (ref 13–44)
MACROCYTES BLD QL: SLIGHT — SIGNIFICANT CHANGE UP
MANUAL SMEAR VERIFICATION: SIGNIFICANT CHANGE UP
MCHC RBC-ENTMCNC: 30.5 PG — SIGNIFICANT CHANGE UP (ref 27–34)
MCHC RBC-ENTMCNC: 31.1 PG — SIGNIFICANT CHANGE UP (ref 27–34)
MCHC RBC-ENTMCNC: 31.7 GM/DL — LOW (ref 32–36)
MCHC RBC-ENTMCNC: 32.3 GM/DL — SIGNIFICANT CHANGE UP (ref 32–36)
MCV RBC AUTO: 94.7 FL — SIGNIFICANT CHANGE UP (ref 80–100)
MCV RBC AUTO: 98.2 FL — SIGNIFICANT CHANGE UP (ref 80–100)
MICROCYTES BLD QL: SLIGHT — SIGNIFICANT CHANGE UP
MONOCYTES # BLD AUTO: 0.12 K/UL — SIGNIFICANT CHANGE UP (ref 0–0.9)
MONOCYTES # BLD AUTO: 0.16 K/UL — SIGNIFICANT CHANGE UP (ref 0–0.9)
MONOCYTES NFR BLD AUTO: 2.6 % — SIGNIFICANT CHANGE UP (ref 2–14)
MONOCYTES NFR BLD AUTO: 5.1 % — SIGNIFICANT CHANGE UP (ref 2–14)
NEUTROPHILS # BLD AUTO: 2.35 K/UL — SIGNIFICANT CHANGE UP (ref 1.8–7.4)
NEUTROPHILS # BLD AUTO: 4.02 K/UL — SIGNIFICANT CHANGE UP (ref 1.8–7.4)
NEUTROPHILS NFR BLD AUTO: 74.6 % — SIGNIFICANT CHANGE UP (ref 43–77)
NEUTROPHILS NFR BLD AUTO: 86.8 % — HIGH (ref 43–77)
NRBC # BLD: 0 /100 WBCS — SIGNIFICANT CHANGE UP (ref 0–0)
NRBC # BLD: 0 /100 WBCS — SIGNIFICANT CHANGE UP (ref 0–0)
PLAT MORPH BLD: NORMAL — SIGNIFICANT CHANGE UP
PLATELET # BLD AUTO: 85 K/UL — LOW (ref 150–400)
PLATELET # BLD AUTO: 85 K/UL — LOW (ref 150–400)
PLATELET COUNT - ESTIMATE: ABNORMAL
POIKILOCYTOSIS BLD QL AUTO: SLIGHT — SIGNIFICANT CHANGE UP
POLYCHROMASIA BLD QL SMEAR: SLIGHT — SIGNIFICANT CHANGE UP
POTASSIUM SERPL-MCNC: 4.6 MMOL/L — SIGNIFICANT CHANGE UP (ref 3.5–5.3)
POTASSIUM SERPL-SCNC: 4.6 MMOL/L — SIGNIFICANT CHANGE UP (ref 3.5–5.3)
PROT SERPL-MCNC: 6.9 G/DL — SIGNIFICANT CHANGE UP (ref 6–8.3)
PROTHROM AB SERPL-ACNC: 11.9 SEC — SIGNIFICANT CHANGE UP (ref 10.5–13.4)
RBC # BLD: 2.28 M/UL — LOW (ref 4.2–5.8)
RBC # BLD: 2.62 M/UL — LOW (ref 4.2–5.8)
RBC # FLD: 19.2 % — HIGH (ref 10.3–14.5)
RBC # FLD: 19.4 % — HIGH (ref 10.3–14.5)
RBC BLD AUTO: ABNORMAL
SARS-COV-2 RNA SPEC QL NAA+PROBE: DETECTED
SODIUM SERPL-SCNC: 136 MMOL/L — SIGNIFICANT CHANGE UP (ref 135–145)
WBC # BLD: 3.15 K/UL — LOW (ref 3.8–10.5)
WBC # BLD: 4.63 K/UL — SIGNIFICANT CHANGE UP (ref 3.8–10.5)
WBC # FLD AUTO: 3.15 K/UL — LOW (ref 3.8–10.5)
WBC # FLD AUTO: 4.63 K/UL — SIGNIFICANT CHANGE UP (ref 3.8–10.5)

## 2022-11-12 PROCEDURE — 99285 EMERGENCY DEPT VISIT HI MDM: CPT

## 2022-11-12 PROCEDURE — 71045 X-RAY EXAM CHEST 1 VIEW: CPT | Mod: 26

## 2022-11-12 RX ORDER — LANOLIN ALCOHOL/MO/W.PET/CERES
3 CREAM (GRAM) TOPICAL AT BEDTIME
Refills: 0 | Status: DISCONTINUED | OUTPATIENT
Start: 2022-11-12 | End: 2022-11-14

## 2022-11-12 RX ORDER — METOPROLOL TARTRATE 50 MG
25 TABLET ORAL DAILY
Refills: 0 | Status: DISCONTINUED | OUTPATIENT
Start: 2022-11-12 | End: 2022-11-14

## 2022-11-12 RX ORDER — SODIUM CHLORIDE 9 MG/ML
3 INJECTION INTRAMUSCULAR; INTRAVENOUS; SUBCUTANEOUS EVERY 8 HOURS
Refills: 0 | Status: DISCONTINUED | OUTPATIENT
Start: 2022-11-12 | End: 2022-11-14

## 2022-11-12 RX ORDER — FUROSEMIDE 40 MG
40 TABLET ORAL ONCE
Refills: 0 | Status: COMPLETED | OUTPATIENT
Start: 2022-11-12 | End: 2022-11-12

## 2022-11-12 RX ORDER — ACETAMINOPHEN 500 MG
650 TABLET ORAL ONCE
Refills: 0 | Status: COMPLETED | OUTPATIENT
Start: 2022-11-12 | End: 2022-11-12

## 2022-11-12 RX ORDER — ACETAMINOPHEN 500 MG
650 TABLET ORAL EVERY 6 HOURS
Refills: 0 | Status: DISCONTINUED | OUTPATIENT
Start: 2022-11-12 | End: 2022-11-14

## 2022-11-12 RX ORDER — FAMOTIDINE 10 MG/ML
1 INJECTION INTRAVENOUS
Qty: 0 | Refills: 0 | DISCHARGE

## 2022-11-12 RX ORDER — OXYCODONE HYDROCHLORIDE 5 MG/1
1 TABLET ORAL
Qty: 0 | Refills: 0 | DISCHARGE

## 2022-11-12 RX ORDER — SUCRALFATE 1 G
1 TABLET ORAL
Refills: 0 | Status: DISCONTINUED | OUTPATIENT
Start: 2022-11-12 | End: 2022-11-14

## 2022-11-12 RX ORDER — AMLODIPINE BESYLATE 2.5 MG/1
10 TABLET ORAL DAILY
Refills: 0 | Status: DISCONTINUED | OUTPATIENT
Start: 2022-11-13 | End: 2022-11-14

## 2022-11-12 RX ORDER — ERYTHROPOIETIN 10000 [IU]/ML
10000 INJECTION, SOLUTION INTRAVENOUS; SUBCUTANEOUS
Refills: 0 | Status: DISCONTINUED | OUTPATIENT
Start: 2022-11-12 | End: 2022-11-14

## 2022-11-12 RX ORDER — DEXAMETHASONE 0.5 MG/5ML
6 ELIXIR ORAL DAILY
Refills: 0 | Status: DISCONTINUED | OUTPATIENT
Start: 2022-11-12 | End: 2022-11-13

## 2022-11-12 RX ORDER — PANTOPRAZOLE SODIUM 20 MG/1
40 TABLET, DELAYED RELEASE ORAL
Refills: 0 | Status: DISCONTINUED | OUTPATIENT
Start: 2022-11-12 | End: 2022-11-14

## 2022-11-12 RX ADMIN — Medication 650 MILLIGRAM(S): at 03:48

## 2022-11-12 RX ADMIN — SODIUM CHLORIDE 3 MILLILITER(S): 9 INJECTION INTRAMUSCULAR; INTRAVENOUS; SUBCUTANEOUS at 13:11

## 2022-11-12 RX ADMIN — Medication 650 MILLIGRAM(S): at 04:10

## 2022-11-12 RX ADMIN — Medication 650 MILLIGRAM(S): at 08:41

## 2022-11-12 RX ADMIN — Medication 650 MILLIGRAM(S): at 09:30

## 2022-11-12 RX ADMIN — Medication 3 MILLIGRAM(S): at 21:55

## 2022-11-12 RX ADMIN — SODIUM CHLORIDE 3 MILLILITER(S): 9 INJECTION INTRAMUSCULAR; INTRAVENOUS; SUBCUTANEOUS at 07:01

## 2022-11-12 RX ADMIN — Medication 650 MILLIGRAM(S): at 21:56

## 2022-11-12 RX ADMIN — Medication 650 MILLIGRAM(S): at 22:44

## 2022-11-12 RX ADMIN — Medication 40 MILLIGRAM(S): at 12:20

## 2022-11-12 NOTE — ED ADULT NURSE REASSESSMENT NOTE - NS ED NURSE REASSESS COMMENT FT1
Pt received at 0700 from MICHI Holm, in bed, aox3, no sign of distress noted, waiting for blood transfusion to be given. Blood requested from blood bank at 0720

## 2022-11-12 NOTE — H&P ADULT - PROBLEM SELECTOR PLAN 5
h/o HTN on Troprol 25mg and Norvasc   c/w home meds with hholding parameters  Monitor BP h/o DM on Levemir 4u BID  f/u A1c  c/w sliding scale  Adjust insulin as indicated  FS ACHS

## 2022-11-12 NOTE — ED ADULT TRIAGE NOTE - ARRIVAL INFO ADDITIONAL COMMENTS
with ESRD on dialysis TThS , access left upper arm, with history of Left BKA with prosthesis on with ESRD on dialysis TThS , access left upper arm, with history of Left BKA with prosthesis on, legally blind

## 2022-11-12 NOTE — H&P ADULT - NSICDXPASTMEDICALHX_GEN_ALL_CORE_FT
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

## 2022-11-12 NOTE — PATIENT PROFILE ADULT - FALL HARM RISK - RISK INTERVENTIONS
Assistance OOB with selected safe patient handling equipment/Assistance with ambulation/Communicate Fall Risk and Risk Factors to all staff, patient, and family/Discuss with provider need for PT consult/Monitor gait and stability/Provide patient with walking aids - walker, cane, crutches/Reinforce activity limits and safety measures with patient and family/Use of alarms - bed, chair and/or voice tab/Visual Cue: Yellow wristband/Bed in lowest position, wheels locked, appropriate side rails in place/Call bell, personal items and telephone in reach/Instruct patient to call for assistance before getting out of bed or chair/Non-slip footwear when patient is out of bed/[place] to call system/Physically safe environment - no spills, clutter or unnecessary equipment/Purposeful Proactive Rounding/Room/bathroom lighting operational, light cord in reach

## 2022-11-12 NOTE — ED ADULT NURSE NOTE - NSIMPLEMENTINTERV_GEN_ALL_ED
Implemented All Universal Safety Interventions:  [place] to call system. Call bell, personal items and telephone within reach. Instruct patient to call for assistance. Room bathroom lighting operational. Non-slip footwear when patient is off stretcher. Physically safe environment: no spills, clutter or unnecessary equipment. Stretcher in lowest position, wheels locked, appropriate side rails in place.

## 2022-11-12 NOTE — CONSULT NOTE ADULT - SUBJECTIVE AND OBJECTIVE BOX
Patient is a 62y Male whom presented to the hospital with   61 y/o male with history of ESRD (last dialyzed thursday), DM, HTN, HLD, anemia, called by dialysis center and told he has low hemoglobin level. Patient states he feels fine with no acute symptoms.   IS A  VERY  NON  COMPLAINT  PT,  HENRY  SKIPS  HIS  HD  TREATMENTS,   AND  WHEN  HE  COMES  REFUSES  TO  COMPLETE  HIS   PRESCRIBED  TIME ON  HD     IS  VERY  AGITATED  AND  WANT TO  BE  DISCHARGED   BACK TO  HIS  CENTER      RECENTLY  WAS IN  [facility]   WITH  UGI BLEED, UNSURE OF  THE  W/U    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      Osteoarthritis      Vision loss of left eye  blind      ESRD on hemodialysis      Cataract  both eyes - hx of sx done      BPH (benign prostatic hyperplasia)      UTI (urinary tract infection)  hx of      Bladder mass  hx of      H/O hematuria      Osteoporosis      Vision loss of right eye  decreased      Depression      Chronic GERD      Osteomyelitis of vertebra      CHF (congestive heart failure)      Below knee amputation status, left  2012- pt is wearing prostesis      History of right cataract extraction      History of left cataract extraction      S/P arteriovenous (AV) fistula creation  right arm brachiocephalic arteriovenous fistula on 11/08/2018      H/O hematuria  s/p bladder bx and fulguration 2/25/2020      H/O transurethral destruction of bladder lesion  2020      History of excision of mass  back mass on 03/31/2021        fish (Rash)  liver (Anaphylaxis)  No Known Drug Allergies    Home Medications Reviewed  Hospital Medications:   MEDICATIONS  (STANDING):  sodium chloride 0.9% lock flush 3 milliLiter(s) IV Push every 8 hours    SOCIAL HISTORY:  Denies ETOh,Smoking,   FAMILY HISTORY:  Family history of cirrhosis of liver (Mother)    Family history of renal failure (Sibling)    Family history of hypertension (Sibling)    Family history of diabetes mellitus (Sibling)    History of substance abuse in sibling (Sibling)      REVIEW OF SYSTEMS:    IS  LYING WITH    HEAD SIDE  UP      HAS NO  FEVER/CHILLS     SAYS  HE  DOESNT  FEEL  SOB     APPETITE IS O K     NO N/V     HAS  LITTLE URINE OUTPUT       VITALS:  T(F): 98.7 (11-12-22 @ 13:10), Max: 98.7 (11-12-22 @ 13:10)  HR: 99 (11-12-22 @ 13:10)  BP: 160/83 (11-12-22 @ 13:10)  RR: 20 (11-12-22 @ 13:10)  SpO2: 91% (11-12-22 @ 13:10)  Wt(kg): --    Height (cm): 167.6 (11-12 @ 00:12)  PHYSICAL EXAM:  Constitutional: NAD  HEENT: CONJ PALE  Neck: No JVD  Respiratory:   FAIR  AIR  ENTERY  BILAT    HAS  CRACKLES  AT  BASES  POST  Cardiovascular: S1, S2, RRR  Gastrointestinal: BS+, soft, NT/ND  Extremities: No peripheral edema R    L  BKA  Neurological: A/O x 3,  : . No [place].     Vascular Access:  AVG  R  UPPER  ARM    LABS:  11-12    136  |  101  |  52<H>  ----------------------------<  145<H>  4.6   |  24  |  12.90<H>    Ca    8.0<L>      12 Nov 2022 03:22    TPro  6.9  /  Alb  3.3<L>  /  TBili  0.6  /  DBili      /  AST  20  /  ALT  22  /  AlkPhos  100  11-12    Creatinine Trend: 12.90 <--                        7.1    3.15  )-----------( 85       ( 12 Nov 2022 03:22 )             22.4     Urine Studies:      RADIOLOGY & ADDITIONAL STUDIES:

## 2022-11-12 NOTE — H&P ADULT - PROBLEM SELECTOR PLAN 2
h/o ESRD on HD (T/Th/S) last dialyzed Thursday, known to be uncooperative with HD only completes 1.5-2h of 3h  admitted for inpatient HD, hypoxic s/p 1u pRBC in the ED > given Lasix x1, minimal improvement   HD as per nephro   c/w Polina-Dior  Renal diet  Nephro consulted: Dr. Washington - on board, planned for HD today

## 2022-11-12 NOTE — H&P ADULT - ASSESSMENT
62M, coming from [facility], ambulates independently +left BKA w/ prosthetic leg, with PMHx ESRD (T/Th/S, last dialyzed Thursday), Anemia with remote GIB, DM, HTN, HLD, BPH, CHF, and PAD, sent from HD center for blood transfusion s/p 1u pRBC in ED, now hypoxic and too unstable for o/p HD. Patient refusing NC, NRBM, and BIPAP. Patient being admitted for AHRF and inpatient HD. Nephrology, Dr. Washington consulted.  62M, coming from [facility], ambulates independently +left BKA w/ prosthetic leg, with PMHx ESRD (T/Th/S, last dialyzed Thursday), Anemia with remote GIB, DM, HTN, HLD, BPH, and PAD, sent from HD center for blood transfusion s/p 1u pRBC in ED, now hypoxic and too unstable for o/p HD. Patient refusing NC, NRBM, and BIPAP. Patient being admitted for AHRF and inpatient HD. Nephrology, Dr. Washington consulted.

## 2022-11-12 NOTE — ED PROVIDER NOTE - OBJECTIVE STATEMENT
63 y/o male with history of ESRD (last dialyzed thursday), DM, HTN, HLD, anemia, called by dialysis center and told he has low hemoglobin level. Patient states he feels fine with no acute symptoms. Patient denies generalized weakness, fatigue, chest pain, shortness of breath, syncope, fever, nausea, vomiting, diarrhea, or any other recent illnesses and hospitalizations.

## 2022-11-12 NOTE — H&P ADULT - PROBLEM SELECTOR PLAN 4
h/o DM on Levemir 4u BID  f/u A1c  c/w sliding scale  Adjust insulin as indicated  FS ACHS p/w Hgb 7.1, denies dark tarry stool, BRBPR, bloody BM, hematemesis. remote h/o GIB.  baseline Hgb 9-10  likely in the setting of CKD  CXR showing right infiltrate c/w infectious etiology. No evidence of congestion   No signs of bleeding on exam  s/p 1u pRBC  Maintain active T&S q72h, transfuse for Hgb <7  Monitor CBC daily

## 2022-11-12 NOTE — H&P ADULT - PROBLEM SELECTOR PLAN 1
began to be SOB s/p 1u pRBC in the ED  h/o ESRD on HD ( T/Th/ F) last dialyzed Thursday, planned to continued o/p HD after blood transfusion    no h/o HF, hb 7.1, COVID pending   CXR showing right infiltrate c/w infectious etiology  DDx Volume overload 2/2 pRBC vs Symptomatic anemia vs PNA  - O2 around 95 on RA , desat on ambulation     - incentive spirometry , duonebs, montelukast and Supple O2 prn   - c/w lasix 20 mg PO and spironolactone   - will do Echo to r/u Pul htn   -f/u Iron studies  - cardio consult Dr Richardson   - Pulm consult Dr Andrews. began to be SOB s/p 1u pRBC in the ED  h/o ESRD on HD ( T/Th/ F) last dialyzed Thursday, planned to continued o/p HD after blood transfusion    no h/o HF, hb 7.1, COVID pending   CXR showing right infiltrate c/w infectious etiology. No evidence of congestion   DDx Volume overload 2/2 pRBC vs Symptomatic anemia vs PNA  Per flow sheet, 88% on RA - REFUSING NC, NRBM, and BIPAP  s/p Lasix 40mg IVP x1 in the ED   (+) b/l coarse crackles on auscultation and RLE edema exam   c/w incentive spirometry and Supple O2 prn   c/w Duoneb and Symbicort (home meds unclear what for)   Nephrology consulted by ED: Dr. Washington - on board for inpatient HD, orders are in, plan for HD later today   will hold off on abx for now (per [facility] chart, ?started/ completed doxy course, will verify)  Remains FULL CODE

## 2022-11-12 NOTE — PATIENT PROFILE ADULT - INTERNATIONAL TRAVEL
preop cbc/bmp/coags/ua wnl per medical clearance  EKG- NSR, nonspecific T wave abnormality  Preop chest x-ray wnl per clearance   covid test negative 8/18  3M dos
No

## 2022-11-12 NOTE — H&P ADULT - HISTORY OF PRESENT ILLNESS
62M, coming from [facility], ambulates independently +left BKA w/ prosthetic leg, with PMHx ESRD (T/Th/S, last dialyzed Thursday), Anemia with remote GIB, DM, HTN, HLD, BPH, CHF, and PAD, sent from HD center for blood transfusion s/p 1u pRBC in ED, now hypoxic and too unstable for o/p HD. Patient states that previously he was feeling SOB, unable to recall how long after pRBC was when he became SOB, but now states he feels better. Denies allergic reaction to prior blood transfusions. Patient is AOx3 but uncooperative at times, inconsistent historian. Per ED attending, patient noted to be in respiratory distress desaturating 92% on RA, refusing NC, NRBM, and BIPAP. At the time, he endorsed to the ED attending DNR/DNI (not documented/charted), low threshold for intubation/ICU, given Lasix IVP x1 (ESRD on HD, does not make urine), with some improvement. Nephrologist, Dr. Washington was consulted for inpatient HD. Patient denies fevers, chills or recent illness. Patient denies HA, generalized weakness, fatigue, chest pain, SOB, abdominal pain, or changes in BM.   GOC: FULL CODE

## 2022-11-12 NOTE — ED PROVIDER NOTE - CLINICAL SUMMARY MEDICAL DECISION MAKING FREE TEXT BOX
Patient sent for low hemoglobin, asymptomatic with benign exam. Labs pending. Patient stable and will reassess.

## 2022-11-12 NOTE — H&P ADULT - PROBLEM SELECTOR PLAN 3
p/w Hgb 7.1, denies dark tarry stool, BRBPR, bloody BM, hematemesis. remote h/o GIB.  baseline Hgb 9-10  likely in the setting of CKD  CXR showing right infiltrate c/w infectious etiology. No evidence of congestion   No signs of bleeding on exam  s/p 1u pRBC  Maintain active T&S q72h, transfuse for Hgb <7  Monitor CBC daily acutely hypoxic s/p 1u pRBC, likely overload in the setting of delayed HD  p/w 96% on RA on admission >pRBC x1> 88%on RA only willing to tolerate NRBM ia blow by PRN  COVID (+), denies prior symptoms   Isolation precaution   holding remdesivir in the setting of ESRD  f/u COVID inflammatory markers   titrate O2 as tolerated

## 2022-11-12 NOTE — PATIENT PROFILE ADULT - FUNCTIONAL ASSESSMENT - BASIC MOBILITY 6.
2-calculated by average/Not able to assess (calculate score using [facility] averaging method)

## 2022-11-12 NOTE — H&P ADULT - ATTENDING COMMENTS
62M, coming from [facility], ambulates independently +left BKA w/ prosthetic leg, with PMHx ESRD (T/Th/S, last dialyzed Thursday), Anemia with remote GIB, DM, HTN, HLD, BPH, CHF, and PAD, sent from HD center for blood transfusion s/p 1u pRBC in ED, now hypoxic and too unstable for o/p HD. Patient states that previously he was feeling SOB, unable to recall how long after pRBC was when he became SOB, but now states he feels better. Denies allergic reaction to prior blood transfusions. Patient is AOx3 but uncooperative at times, inconsistent historian. Per ED attending, patient noted to be in respiratory distress desaturating 92% on RA, refusing NC, NRBM, and BIPAP. At the time, he endorsed to the ED attending DNR/DNI (not documented/charted), low threshold for intubation/ICU, given Lasix IVP x1 (ESRD on HD, does not make urine), with some improvement. Nephrologist, Dr. Washington was consulted for inpatient HD. Patient denies fevers, chills or recent illness. Patient denies HA, generalized weakness, fatigue, chest pain, SOB, abdominal pain, or changes in BM.   GOC: FULL CODE    # CASE DISCUSSED AT LENGTH WITH PATIENT, CONVEYED THAT PROGNOSIS IS POOR; PATIENT VERBALIZED UNDERSTANDING. ALSO DISCUSSED WITH PATIENT THAT COMPLIANCE WITH EVALUATIONS AND INTERVENTIONS IS IMPERATIVE. PATIENT VERBALIZED UNDERSTANDING.    # ACUTE HYPOXIC RESPIRATORY FAILURE S/T SUSPECTED PULMONARY EDEMA VS. COVID19 INFECTION + SUSPECTED PNEUMONIA  # UNCONTROLLED HTN  # ESRD ON HD TTS - W/ HX OF NONCOMPLIANCE  - IMPROVED S/P HD  - RESUME HOME METOPROLOL AND NORVASC  - PLACED ON DECADRON + ROCEPHIN + AZITHROMYCIN, F/U BCX  - MONITORING INFLAMMATORY MARKERS, MONITORING PO2  - SUPPLEMENTAL O2  - CONTACT AND DROPLET ISOLATION PRECAUTIONS  - ID CONSULT AND PULMONARY CONSULT IN A.M.    # ACUTE ON CHRONIC ANEMIA OF CKD  # HX OF PANCYTOPENIA   - S/P PRBC TRANSFUSION [11/12]  - TREND HGB, TRANSFUSION THRESHOLD HGB < 7  - TYPE AND SCREEN  - F/U HIV AND HEPATITIS  - ON EPO  - DENIES RECENT HEMATEMESIS, MELENA, HEMATOCHEZIA    # HISTORY OF ESOPHAGITIS AND DUODENITIS [1/2021], HX OF GASTROPARESIS W/ EVIDENCE OF THICKENED ESOPHAGUS ON PREVIOUS CT SCAN   - RECENT EGD AT [facility] - DEMONSTRATED RETAINED FOOD PRODUCT IN STOMACH, RECOMMENDED FOR GASTRIC EMPTYING STUDY  - DENIES RECENT HEMATEMESIS   - MONITORING HGB, PLACED ON PPI BID  - CARAFATE, PRN ANTIEMETICS  - MONITORING FOR SYMPTOMS    # SEVERE PROTEIN CALORIE MALNUTRITION, FAILURE TO THRIVE - NUTRITIONAL SUPPLEMENT    # HLD  # HTN  # DM   # PARTIALLY BLIND  # S/P LEFT BKA  # HX OF PVD  # LS SPINAL STENOSIS  # GI AND DVT PPX.

## 2022-11-12 NOTE — H&P ADULT - NSHPPHYSICALEXAM_GEN_ALL_CORE
Vital Signs Last 24 Hrs  T(C): 37.1 (12 Nov 2022 13:10), Max: 37.1 (12 Nov 2022 13:10)  T(F): 98.7 (12 Nov 2022 13:10), Max: 98.7 (12 Nov 2022 13:10)  HR: 99 (12 Nov 2022 13:10) (80 - 123)  BP: 160/83 (12 Nov 2022 13:10) (147/68 - 164/87)  BP(mean): --  RR: 20 (12 Nov 2022 13:10) (18 - 25)  SpO2: 91% (12 Nov 2022 13:10) (91% - 99%)    Parameters below as of 12 Nov 2022 13:10  Patient On (Oxygen Delivery Method): room air  O2 Flow (L/min): 94    GENERAL: NAD, lying in bed comfortably  HEAD:  Atraumatic, Normocephalic  EYES: EOMI, PERRLA, (+) Cataracts b/l  ENT: Moist mucous membranes  NECK: Supple, No JVD  CHEST/LUNG: (+) b/l coarse crackles   HEART: Regular rate and rhythm; No murmurs, rubs, or gallops  ABDOMEN: Bowel sounds present; Soft, Nontender, Nondistended.  EXTREMITIES:  2+ Peripheral Pulses, brisk capillary refill. No clubbing, cyanosis (+) RLE edema, s/p Left BKA  NERVOUS SYSTEM:  Alert & Oriented X3, speech clear. No deficits   MSK: FROM all 4 extremities, full and equal strength  SKIN: No rashes or lesions

## 2022-11-12 NOTE — H&P ADULT - PROBLEM SELECTOR PLAN 7
SCD for anemia, pending post-transfusion CBC  Protonix 40mg BID (home meds for h/o GIB- no active signs of bleeding) No documented history of hypothyroidism per [facility] chart takes synthroid 25mcg  c/w home meds

## 2022-11-12 NOTE — ED ADULT NURSE REASSESSMENT NOTE - NS ED NURSE REASSESS COMMENT FT1
Pt aox3, in stable condition, sent to dialysis with NRB mask. No respiratory distress noted. Pt endorsed to [facility].

## 2022-11-12 NOTE — ED ADULT NURSE REASSESSMENT NOTE - NS ED NURSE REASSESS COMMENT FT1
1215: Pt c/o difficulty breathing. O2 92% on RA, MD Dela Cruz notified and present at bed side. Pt refused NC, tried NRB mask, pt removed it c/o of continuing SOB. Respiratory called for BIPAP. Pt refuses BIPAP.   1245: Pt placed again on NRB mask, O2 improving 97%-100% on NRB mask 15%. Pt placed on cardiac monitor.

## 2022-11-12 NOTE — H&P ADULT - PROBLEM SELECTOR PLAN 6
No documented history of hypothyroidism per [facility] chart takes synthroid 25mcg  c/w home meds h/o HTN on Troprol 25mg and Norvasc   c/w home meds with hholding parameters  Monitor BP

## 2022-11-12 NOTE — ED ADULT NURSE REASSESSMENT NOTE - NS ED NURSE REASSESS COMMENT FT1
Pt aox3, in bed calm. 1 Unit of PRBs given. No sign of adverse reaction or respiratory distress noted.

## 2022-11-12 NOTE — ED ADULT NURSE NOTE - ED STAT RN HANDOFF DETAILS
Received patient from morning shift, patient is currently on dialysis. Endorsed patient to MICHI Atkins ([address] [place] [address]) for continuity of care.

## 2022-11-12 NOTE — H&P ADULT - PROBLEM SELECTOR PLAN 8
SCD for anemia, pending post-transfusion CBC  Protonix 40mg BID (home meds for h/o GIB- no active signs of bleeding)

## 2022-11-13 LAB
ALBUMIN SERPL ELPH-MCNC: 3.2 G/DL — LOW (ref 3.5–5)
ALP SERPL-CCNC: 74 U/L — SIGNIFICANT CHANGE UP (ref 40–120)
ALT FLD-CCNC: 21 U/L DA — SIGNIFICANT CHANGE UP (ref 10–60)
ANION GAP SERPL CALC-SCNC: 11 MMOL/L — SIGNIFICANT CHANGE UP (ref 5–17)
AST SERPL-CCNC: 20 U/L — SIGNIFICANT CHANGE UP (ref 10–40)
BASOPHILS # BLD AUTO: 0.02 K/UL — SIGNIFICANT CHANGE UP (ref 0–0.2)
BASOPHILS NFR BLD AUTO: 0.6 % — SIGNIFICANT CHANGE UP (ref 0–2)
BILIRUB SERPL-MCNC: 0.8 MG/DL — SIGNIFICANT CHANGE UP (ref 0.2–1.2)
BUN SERPL-MCNC: 36 MG/DL — HIGH (ref 7–18)
CALCIUM SERPL-MCNC: 8 MG/DL — LOW (ref 8.4–10.5)
CHLORIDE SERPL-SCNC: 100 MMOL/L — SIGNIFICANT CHANGE UP (ref 96–108)
CO2 SERPL-SCNC: 26 MMOL/L — SIGNIFICANT CHANGE UP (ref 22–31)
CREAT SERPL-MCNC: 9.79 MG/DL — HIGH (ref 0.5–1.3)
CRP SERPL-MCNC: 7 MG/L — HIGH
D DIMER BLD IA.RAPID-MCNC: 416 NG/ML DDU — HIGH
EGFR: 6 ML/MIN/1.73M2 — LOW
EOSINOPHIL # BLD AUTO: 0.14 K/UL — SIGNIFICANT CHANGE UP (ref 0–0.5)
EOSINOPHIL NFR BLD AUTO: 4.5 % — SIGNIFICANT CHANGE UP (ref 0–6)
ERYTHROCYTE [SEDIMENTATION RATE] IN BLOOD: 25 MM/HR — HIGH (ref 0–20)
FERRITIN SERPL-MCNC: 534 NG/ML — HIGH (ref 30–400)
GLUCOSE BLDC GLUCOMTR-MCNC: 286 MG/DL — HIGH (ref 70–99)
GLUCOSE SERPL-MCNC: 101 MG/DL — HIGH (ref 70–99)
HBV SURFACE AG SER-ACNC: SIGNIFICANT CHANGE UP
HCT VFR BLD CALC: 25.9 % — LOW (ref 39–50)
HGB BLD-MCNC: 8.2 G/DL — LOW (ref 13–17)
IMM GRANULOCYTES NFR BLD AUTO: 0.3 % — SIGNIFICANT CHANGE UP (ref 0–0.9)
LDH SERPL L TO P-CCNC: 268 U/L — HIGH (ref 120–225)
LYMPHOCYTES # BLD AUTO: 0.28 K/UL — LOW (ref 1–3.3)
LYMPHOCYTES # BLD AUTO: 9 % — LOW (ref 13–44)
MAGNESIUM SERPL-MCNC: 2.7 MG/DL — HIGH (ref 1.6–2.6)
MCHC RBC-ENTMCNC: 30 PG — SIGNIFICANT CHANGE UP (ref 27–34)
MCHC RBC-ENTMCNC: 31.7 GM/DL — LOW (ref 32–36)
MCV RBC AUTO: 94.9 FL — SIGNIFICANT CHANGE UP (ref 80–100)
MONOCYTES # BLD AUTO: 0.12 K/UL — SIGNIFICANT CHANGE UP (ref 0–0.9)
MONOCYTES NFR BLD AUTO: 3.8 % — SIGNIFICANT CHANGE UP (ref 2–14)
NEUTROPHILS # BLD AUTO: 2.55 K/UL — SIGNIFICANT CHANGE UP (ref 1.8–7.4)
NEUTROPHILS NFR BLD AUTO: 81.8 % — HIGH (ref 43–77)
NRBC # BLD: 0 /100 WBCS — SIGNIFICANT CHANGE UP (ref 0–0)
PHOSPHATE SERPL-MCNC: 3.9 MG/DL — SIGNIFICANT CHANGE UP (ref 2.5–4.5)
PLATELET # BLD AUTO: 89 K/UL — LOW (ref 150–400)
POTASSIUM SERPL-MCNC: 4.4 MMOL/L — SIGNIFICANT CHANGE UP (ref 3.5–5.3)
POTASSIUM SERPL-SCNC: 4.4 MMOL/L — SIGNIFICANT CHANGE UP (ref 3.5–5.3)
PROCALCITONIN SERPL-MCNC: 0.8 NG/ML — HIGH (ref 0.02–0.1)
PROT SERPL-MCNC: 6.6 G/DL — SIGNIFICANT CHANGE UP (ref 6–8.3)
RBC # BLD: 2.73 M/UL — LOW (ref 4.2–5.8)
RBC # FLD: 19.2 % — HIGH (ref 10.3–14.5)
SODIUM SERPL-SCNC: 137 MMOL/L — SIGNIFICANT CHANGE UP (ref 135–145)
WBC # BLD: 3.12 K/UL — LOW (ref 3.8–10.5)
WBC # FLD AUTO: 3.12 K/UL — LOW (ref 3.8–10.5)

## 2022-11-13 RX ORDER — DEXAMETHASONE 0.5 MG/5ML
6 ELIXIR ORAL DAILY
Refills: 0 | Status: DISCONTINUED | OUTPATIENT
Start: 2022-11-13 | End: 2022-11-14

## 2022-11-13 RX ORDER — HYDRALAZINE HCL 50 MG
25 TABLET ORAL THREE TIMES A DAY
Refills: 0 | Status: DISCONTINUED | OUTPATIENT
Start: 2022-11-13 | End: 2022-11-14

## 2022-11-13 RX ORDER — AZITHROMYCIN 500 MG/1
TABLET, FILM COATED ORAL
Refills: 0 | Status: DISCONTINUED | OUTPATIENT
Start: 2022-11-13 | End: 2022-11-13

## 2022-11-13 RX ORDER — CEFTRIAXONE 500 MG/1
1000 INJECTION, POWDER, FOR SOLUTION INTRAMUSCULAR; INTRAVENOUS EVERY 24 HOURS
Refills: 0 | Status: DISCONTINUED | OUTPATIENT
Start: 2022-11-13 | End: 2022-11-13

## 2022-11-13 RX ORDER — AZITHROMYCIN 500 MG/1
500 TABLET, FILM COATED ORAL ONCE
Refills: 0 | Status: DISCONTINUED | OUTPATIENT
Start: 2022-11-13 | End: 2022-11-13

## 2022-11-13 RX ADMIN — Medication 25 MILLIGRAM(S): at 01:14

## 2022-11-13 RX ADMIN — AMLODIPINE BESYLATE 10 MILLIGRAM(S): 2.5 TABLET ORAL at 06:32

## 2022-11-13 RX ADMIN — PANTOPRAZOLE SODIUM 40 MILLIGRAM(S): 20 TABLET, DELAYED RELEASE ORAL at 06:33

## 2022-11-13 RX ADMIN — Medication 1 GRAM(S): at 13:13

## 2022-11-13 RX ADMIN — Medication 25 MILLIGRAM(S): at 12:58

## 2022-11-13 RX ADMIN — Medication 650 MILLIGRAM(S): at 23:52

## 2022-11-13 RX ADMIN — Medication 1 GRAM(S): at 06:33

## 2022-11-13 RX ADMIN — Medication 650 MILLIGRAM(S): at 07:26

## 2022-11-13 RX ADMIN — Medication 25 MILLIGRAM(S): at 06:32

## 2022-11-13 RX ADMIN — Medication 25 MILLIGRAM(S): at 21:47

## 2022-11-13 RX ADMIN — Medication 3 MILLIGRAM(S): at 21:47

## 2022-11-13 RX ADMIN — Medication 650 MILLIGRAM(S): at 06:49

## 2022-11-13 RX ADMIN — Medication 6 MILLIGRAM(S): at 10:49

## 2022-11-13 RX ADMIN — Medication 650 MILLIGRAM(S): at 16:52

## 2022-11-13 RX ADMIN — Medication 650 MILLIGRAM(S): at 18:00

## 2022-11-13 RX ADMIN — Medication 1 GRAM(S): at 01:14

## 2022-11-13 NOTE — PROGRESS NOTE ADULT - SUBJECTIVE AND OBJECTIVE BOX
Patient is a 62y old  Male who presents with a chief complaint of AHRF, ESRD on HD (12 Nov 2022 14:10)    PATIENT IS SEEN AND EXAMINED IN MEDICAL FLOOR.  NGT [    ]    JEREMY [   ]      GT [   ]    ALLERGIES:  fish (Rash)  liver (Anaphylaxis)  No Known Drug Allergies      Daily     Daily     VITALS:    Vital Signs Last 24 Hrs  T(C): 36.9 (13 Nov 2022 06:25), Max: 37.1 (12 Nov 2022 13:10)  T(F): 98.4 (13 Nov 2022 06:25), Max: 98.7 (12 Nov 2022 13:10)  HR: 92 (13 Nov 2022 06:25) (83 - 123)  BP: 173/84 (13 Nov 2022 06:25) (147/96 - 173/84)  BP(mean): --  RR: 18 (13 Nov 2022 06:25) (18 - 25)  SpO2: 99% (13 Nov 2022 06:25) (91% - 99%)    Parameters below as of 13 Nov 2022 06:25  Patient On (Oxygen Delivery Method): mask, nonrebreather  O2 Flow (L/min): 10      LABS:    CBC Full  -  ( 13 Nov 2022 06:48 )  WBC Count : 3.12 K/uL  RBC Count : 2.73 M/uL  Hemoglobin : 8.2 g/dL  Hematocrit : 25.9 %  Platelet Count - Automated : 89 K/uL  Mean Cell Volume : 94.9 fl  Mean Cell Hemoglobin : 30.0 pg  Mean Cell Hemoglobin Concentration : 31.7 gm/dL  Auto Neutrophil # : 2.55 K/uL  Auto Lymphocyte # : 0.28 K/uL  Auto Monocyte # : 0.12 K/uL  Auto Eosinophil # : 0.14 K/uL  Auto Basophil # : 0.02 K/uL  Auto Neutrophil % : 81.8 %  Auto Lymphocyte % : 9.0 %  Auto Monocyte % : 3.8 %  Auto Eosinophil % : 4.5 %  Auto Basophil % : 0.6 %    PT/INR - ( 12 Nov 2022 03:22 )   PT: 11.9 sec;   INR: 1.00 ratio         PTT - ( 12 Nov 2022 03:22 )  PTT:33.4 sec  11-13    137  |  100  |  36<H>  ----------------------------<  101<H>  4.4   |  26  |  9.79<H>    Ca    8.0<L>      13 Nov 2022 06:48  Phos  3.9     11-13  Mg     2.7     11-13    TPro  6.6  /  Alb  3.2<L>  /  TBili  0.8  /  DBili  x   /  AST  20  /  ALT  21  /  AlkPhos  74  11-13    CAPILLARY BLOOD GLUCOSE            LIVER FUNCTIONS - ( 13 Nov 2022 06:48 )  Alb: 3.2 g/dL / Pro: 6.6 g/dL / ALK PHOS: 74 U/L / ALT: 21 U/L DA / AST: 20 U/L / GGT: x           Creatinine Trend: 9.79<--, 12.90<--  I&O's Summary              MEDICATIONS:    MEDICATIONS  (STANDING):  amLODIPine   Tablet 10 milliGRAM(s) Oral daily  dexAMETHasone     Tablet 6 milliGRAM(s) Oral daily  epoetin beverley-epbx (RETACRIT) Injectable 09154 Unit(s) IV Push <User Schedule>  levoFLOXacin  Tablet 250 milliGRAM(s) Oral every 24 hours  metoprolol succinate ER 25 milliGRAM(s) Oral daily  pantoprazole    Tablet 40 milliGRAM(s) Oral two times a day  sodium chloride 0.9% lock flush 3 milliLiter(s) IV Push every 8 hours  sucralfate suspension 1 Gram(s) Oral four times a day      MEDICATIONS  (PRN):  acetaminophen     Tablet .. 650 milliGRAM(s) Oral every 6 hours PRN Temp greater or equal to 38C (100.4F), Mild Pain (1 - 3)  melatonin 3 milliGRAM(s) Oral at bedtime PRN Insomnia      REVIEW OF SYSTEMS:                           ALL ROS DONE [ X   ]    CONSTITUTIONAL:  LETHARGIC [   ], FEVER [   ], UNRESPONSIVE [   ]  CVS:  CP  [   ], SOB, [   ], PALPITATIONS [   ], DIZZYNESS [   ]  RS: COUGH [   ], SPUTUM [   ]  GI: ABDOMINAL PAIN [   ], NAUSEA [   ], VOMITINGS [   ], DIARRHEA [   ], CONSTIPATION [   ]  :  DYSURIA [   ], NOCTURIA [   ], INCREASED FREQUENCY [   ], DRIBLING [   ],  SKELETAL: PAINFUL JOINTS [   ], SWOLLEN JOINTS [   ], NECK ACHE [   ], LOW BACK ACHE [   ],  SKIN : ULCERS [   ], RASH [   ], ITCHING [   ]  CNS: HEAD ACHE [   ], DOUBLE VISION [   ], BLURRED VISION [   ], AMS / CONFUSION [   ], SEIZURES [   ], WEAKNESS [   ],TINGLING / NUMBNESS [   ]    PHYSICAL EXAMINATION:  GENERAL APPEARANCE: NO DISTRESS  HEENT:  NO PALLOR, NO  JVD,  NO   NODES, NECK SUPPLE  CVS: S1 +, S2 +,   RS: AEEB,  OCCASIONAL  RALES +,   NO RONCHI  ABD: SOFT, NT, NO, BS +  EXT: NO PE  SKIN: WARM,   SKELETAL:  ROM ACCEPTABLE  CNS:  AAO X    ,   DEFICITS    RADIOLOGY :      ASSESSMENT :     Acute respiratory failure with hypoxia    No pertinent past medical history    Hypertension    Adrenal insufficiency    CKD (chronic kidney disease)    Anemia    Glaucoma    Coronary artery disease    HLD (hyperlipidemia)    Peripheral vascular disease    Spinal stenosis of lumbosacral region    Hyperparathyroidism    Diabetes mellitus    Diabetic neuropathy    Contracture of hand    Osteoarthritis    Osteoporosis    Vision loss of left eye    ESRD on hemodialysis    Cataract    BPH (benign prostatic hyperplasia)    UTI (urinary tract infection)    Bladder mass    H/O hematuria    Osteoporosis    Vision loss of right eye    Depression    Chronic GERD    Osteomyelitis of vertebra    CHF (congestive heart failure)    No significant past surgical history    Below knee amputation status, left    History of right cataract extraction    History of left cataract extraction    S/P arteriovenous (AV) fistula creation    H/O hematuria    H/O transurethral destruction of bladder lesion    History of excision of mass        PLAN:  HPI:  62M, coming from [facility], ambulates independently +left BKA w/ prosthetic leg, with PMHx ESRD (T/Th/S, last dialyzed Thursday), Anemia with remote GIB, DM, HTN, HLD, BPH, CHF, and PAD, sent from HD center for blood transfusion s/p 1u pRBC in ED, now hypoxic and too unstable for o/p HD. Patient states that previously he was feeling SOB, unable to recall how long after pRBC was when he became SOB, but now states he feels better. Denies allergic reaction to prior blood transfusions. Patient is AOx3 but uncooperative at times, inconsistent historian. Per ED attending, patient noted to be in respiratory distress desaturating 92% on RA, refusing NC, NRBM, and BIPAP. At the time, he endorsed to the ED attending DNR/DNI (not documented/charted), low threshold for intubation/ICU, given Lasix IVP x1 (ESRD on HD, does not make urine), with some improvement. Nephrologist, Dr. Felix was consulted for inpatient HD. Patient denies fevers, chills or recent illness. Patient denies HA, generalized weakness, fatigue, chest pain, SOB, abdominal pain, or changes in BM.   [facility]: FULL CODE (12 Nov 2022 14:10)      # CASE DISCUSSED AT LENGTH WITH PATIENT, CONVEYED THAT PROGNOSIS IS POOR; PATIENT VERBALIZED UNDERSTANDING. ALSO DISCUSSED WITH PATIENT THAT COMPLIANCE WITH EVALUATIONS AND INTERVENTIONS IS IMPERATIVE. PATIENT VERBALIZED UNDERSTANDING.    # ACUTE HYPOXIC RESPIRATORY FAILURE S/T SUSPECTED PULMONARY EDEMA VS. COVID19 INFECTION + SUSPECTED PNEUMONIA  # UNCONTROLLED HTN  # ESRD ON HD TTS - W/ HX OF NONCOMPLIANCE  - IMPROVED S/P HD  - RESUME HOME METOPROLOL AND NORVASC  - PLACED ON DECADRON + ROCEPHIN + AZITHROMYCIN, F/U BCX  - MONITORING INFLAMMATORY MARKERS, MONITORING PO2  - SUPPLEMENTAL O2  - CONTACT AND DROPLET ISOLATION PRECAUTIONS  - ID CONSULT AND PULMONARY CONSULT IN A.M.    # ACUTE ON CHRONIC ANEMIA OF CKD  # HX OF PANCYTOPENIA   - S/P PRBC TRANSFUSION [11/12]  - TREND HGB, TRANSFUSION THRESHOLD HGB < 7  - TYPE AND SCREEN  - F/U HIV AND HEPATITIS  - ON EPO  - DENIES RECENT HEMATEMESIS, MELENA, HEMATOCHEZIA    # HISTORY OF ESOPHAGITIS AND DUODENITIS [1/2021], HX OF GASTROPARESIS W/ EVIDENCE OF THICKENED ESOPHAGUS ON PREVIOUS CT SCAN   - RECENT EGD AT [facility] - DEMONSTRATED RETAINED FOOD PRODUCT IN STOMACH, RECOMMENDED FOR GASTRIC EMPTYING STUDY  - DENIES RECENT HEMATEMESIS   - MONITORING HGB, PLACED ON PPI BID  - CARAFATE, PRN ANTIEMETICS  - MONITORING FOR SYMPTOMS    # SEVERE PROTEIN CALORIE MALNUTRITION, FAILURE TO THRIVE - NUTRITIONAL SUPPLEMENT    # HLD  # HTN  # DM   # PARTIALLY BLIND  # S/P LEFT BKA  # HX OF PVD  # LS SPINAL STENOSIS  # GI AND DVT PPX.    Patient is a 62y old  Male who presents with a chief complaint of AHRF, ESRD on HD (12 Nov 2022 14:10)    PATIENT IS SEEN AND EXAMINED IN MEDICAL FLOOR.      ALLERGIES:  fish (Rash)  liver (Anaphylaxis)  No Known Drug Allergies      VITALS:    Vital Signs Last 24 Hrs  T(C): 36.9 (13 Nov 2022 06:25), Max: 37.1 (12 Nov 2022 13:10)  T(F): 98.4 (13 Nov 2022 06:25), Max: 98.7 (12 Nov 2022 13:10)  HR: 92 (13 Nov 2022 06:25) (83 - 123)  BP: 173/84 (13 Nov 2022 06:25) (147/96 - 173/84)  BP(mean): --  RR: 18 (13 Nov 2022 06:25) (18 - 25)  SpO2: 99% (13 Nov 2022 06:25) (91% - 99%)    Parameters below as of 13 Nov 2022 06:25  Patient On (Oxygen Delivery Method): mask, nonrebreather  O2 Flow (L/min): 10      LABS:    CBC Full  -  ( 13 Nov 2022 06:48 )  WBC Count : 3.12 K/uL  RBC Count : 2.73 M/uL  Hemoglobin : 8.2 g/dL  Hematocrit : 25.9 %  Platelet Count - Automated : 89 K/uL  Mean Cell Volume : 94.9 fl  Mean Cell Hemoglobin : 30.0 pg  Mean Cell Hemoglobin Concentration : 31.7 gm/dL  Auto Neutrophil # : 2.55 K/uL  Auto Lymphocyte # : 0.28 K/uL  Auto Monocyte # : 0.12 K/uL  Auto Eosinophil # : 0.14 K/uL  Auto Basophil # : 0.02 K/uL  Auto Neutrophil % : 81.8 %  Auto Lymphocyte % : 9.0 %  Auto Monocyte % : 3.8 %  Auto Eosinophil % : 4.5 %  Auto Basophil % : 0.6 %    PT/INR - ( 12 Nov 2022 03:22 )   PT: 11.9 sec;   INR: 1.00 ratio         PTT - ( 12 Nov 2022 03:22 )  PTT:33.4 sec  11-13    137  |  100  |  36<H>  ----------------------------<  101<H>  4.4   |  26  |  9.79<H>    Ca    8.0<L>      13 Nov 2022 06:48  Phos  3.9     11-13  Mg     2.7     11-13    TPro  6.6  /  Alb  3.2<L>  /  TBili  0.8  /  DBili  x   /  AST  20  /  ALT  21  /  AlkPhos  74  11-13    CAPILLARY BLOOD GLUCOSE      LIVER FUNCTIONS - ( 13 Nov 2022 06:48 )  Alb: 3.2 g/dL / Pro: 6.6 g/dL / ALK PHOS: 74 U/L / ALT: 21 U/L DA / AST: 20 U/L / GGT: x           Creatinine Trend: 9.79<--, 12.90<--  I&O's Summary      MEDICATIONS:    MEDICATIONS  (STANDING):  amLODIPine   Tablet 10 milliGRAM(s) Oral daily  dexAMETHasone     Tablet 6 milliGRAM(s) Oral daily  epoetin beverley-epbx (RETACRIT) Injectable 63191 Unit(s) IV Push <User Schedule>  levoFLOXacin  Tablet 250 milliGRAM(s) Oral every 24 hours  metoprolol succinate ER 25 milliGRAM(s) Oral daily  pantoprazole    Tablet 40 milliGRAM(s) Oral two times a day  sodium chloride 0.9% lock flush 3 milliLiter(s) IV Push every 8 hours  sucralfate suspension 1 Gram(s) Oral four times a day      MEDICATIONS  (PRN):  acetaminophen     Tablet .. 650 milliGRAM(s) Oral every 6 hours PRN Temp greater or equal to 38C (100.4F), Mild Pain (1 - 3)  melatonin 3 milliGRAM(s) Oral at bedtime PRN Insomnia      REVIEW OF SYSTEMS:                           ALL ROS DONE [ X   ]    CONSTITUTIONAL:  LETHARGIC [   ], FEVER [   ], UNRESPONSIVE [   ]  CVS:  CP  [   ], SOB, [   ], PALPITATIONS [   ], DIZZYNESS [   ]  RS: COUGH [   ], SPUTUM [   ]  GI: ABDOMINAL PAIN [   ], NAUSEA [   ], VOMITINGS [   ], DIARRHEA [   ], CONSTIPATION [   ]  :  DYSURIA [   ], NOCTURIA [   ], INCREASED FREQUENCY [   ], DRIBLING [   ],  SKELETAL: PAINFUL JOINTS [   ], SWOLLEN JOINTS [   ], NECK ACHE [   ], LOW BACK ACHE [   ],  SKIN : ULCERS [   ], RASH [   ], ITCHING [   ]  CNS: HEAD ACHE [   ], DOUBLE VISION [   ], BLURRED VISION [   ], AMS / CONFUSION [   ], SEIZURES [   ], WEAKNESS [   ],TINGLING / NUMBNESS [   ]    PHYSICAL EXAMINATION:  GENERAL APPEARANCE: NO DISTRESS  HEENT:  NO PALLOR, NO  JVD,  NO   NODES, NECK SUPPLE  CVS: S1 +, S2 +,   RS: AEEB,  OCCASIONAL  RALES +,   NO RONCHI  ABD: SOFT, NT, NO, BS +  EXT: NO PE  SKIN: WARM,   SKELETAL: LEFT BKA  CNS:  AAO X 3    RADIOLOGY :    ACC: [id number] EXAM:  XR CHEST PORTABLE IMMED 1V                          PROCEDURE DATE:  11/12/2022          INTERPRETATION:  Chest portable    CLINICAL HISTORY: Short of breath    COMPARISON: 10/3/2022    FINDINGS: No change in the heart or mediastinal configuration. Hazy right   mid and lower lung field infiltrates with more patchy right basilar   components is now seen consistent with pneumonia. Left lung is clear. The   angles are sharp.    IMPRESSION: Right mid and lower lung field infiltrates.      ASSESSMENT :     Acute respiratory failure with hypoxia    No pertinent past medical history    Hypertension    Adrenal insufficiency    CKD (chronic kidney disease)    Anemia    Glaucoma    Coronary artery disease    HLD (hyperlipidemia)    Peripheral vascular disease    Spinal stenosis of lumbosacral region    Hyperparathyroidism    Diabetes mellitus    Diabetic neuropathy    Contracture of hand    Osteoarthritis    Osteoporosis    Vision loss of left eye    ESRD on hemodialysis    Cataract    BPH (benign prostatic hyperplasia)    UTI (urinary tract infection)    Bladder mass    H/O hematuria    Osteoporosis    Vision loss of right eye    Depression    Chronic GERD    Osteomyelitis of vertebra    CHF (congestive heart failure)    No significant past surgical history    Below knee amputation status, left    History of right cataract extraction    History of left cataract extraction    S/P arteriovenous (AV) fistula creation    H/O hematuria    H/O transurethral destruction of bladder lesion    History of excision of mass        PLAN:  HPI:  62M, coming from [facility], ambulates independently +left BKA w/ prosthetic leg, with PMHx ESRD (T/Th/S, last dialyzed Thursday), Anemia with remote GIB, DM, HTN, HLD, BPH, CHF, and PAD, sent from HD center for blood transfusion s/p 1u pRBC in ED, now hypoxic and too unstable for o/p HD. Patient states that previously he was feeling SOB, unable to recall how long after pRBC was when he became SOB, but now states he feels better. Denies allergic reaction to prior blood transfusions. Patient is AOx3 but uncooperative at times, inconsistent historian. Per ED attending, patient noted to be in respiratory distress desaturating 92% on RA, refusing NC, NRBM, and BIPAP. At the time, he endorsed to the ED attending DNR/DNI (not documented/charted), low threshold for intubation/ICU, given Lasix IVP x1 (ESRD on HD, does not make urine), with some improvement. Nephrologist, Dr. Washington was consulted for inpatient HD. Patient denies fevers, chills or recent illness. Patient denies HA, generalized weakness, fatigue, chest pain, SOB, abdominal pain, or changes in BM.   [facility]: FULL CODE (12 Nov 2022 14:10)      # CASE DISCUSSED AT LENGTH WITH PATIENT AND PATIENT'S BROTHER ARTEMIO YOUNG [VIA PATIENT'S PHONE], CONVEYED THAT PROGNOSIS IS POOR; PATIENT VERBALIZED UNDERSTANDING. ALSO DISCUSSED WITH PATIENT THAT COMPLIANCE WITH EVALUATIONS AND INTERVENTIONS IS IMPERATIVE. PATIENT VERBALIZED UNDERSTANDING.    # ACUTE HYPOXIC RESPIRATORY FAILURE S/T SUSPECTED PULMONARY EDEMA VS. COVID19 INFECTION + SUSPECTED PNEUMONIA  # UNCONTROLLED HTN  # ESRD ON HD TTS - W/ HX OF NONCOMPLIANCE  - IMPROVED S/P HD  - HYDRALAZINE, METOPROLOL AND NORVASC  - PLACED ON DECADRON, F/U BCX                           ;  PATIENT REFUSED IV ANTIBIOTICS, COUNSELLED AT LENGTH ABOUT RISKS OF REFUSING IV ANTIBIOTICS. HE IS AGREEABLE FOR PO ANTIBIOTICS, RECOMMENDED FOR LEVAQUIN BY ID CONSULT  - MONITORING INFLAMMATORY MARKERS, MONITORING PO2  - SUPPLEMENTAL O2  - CONTACT AND DROPLET ISOLATION PRECAUTIONS  - ID CONSULT     # ACUTE ON CHRONIC ANEMIA OF CKD  # HX OF PANCYTOPENIA   - S/P PRBC TRANSFUSION [11/12]  - TREND HGB, TRANSFUSION THRESHOLD HGB < 7  - TYPE AND SCREEN  - F/U HIV AND HEPATITIS  - ON EPO  - DENIES RECENT HEMATEMESIS, MELENA, HEMATOCHEZIA    # HISTORY OF ESOPHAGITIS AND DUODENITIS [1/2021], HX OF GASTROPARESIS W/ EVIDENCE OF THICKENED ESOPHAGUS ON PREVIOUS CT SCAN   - RECENT EGD AT [facility] - DEMONSTRATED RETAINED FOOD PRODUCT IN STOMACH, RECOMMENDED FOR GASTRIC EMPTYING STUDY  - DENIES RECENT HEMATEMESIS   - MONITORING HGB, PLACED ON PPI BID  - CARAFATE, PRN ANTIEMETICS  - MONITORING FOR SYMPTOMS    # SEVERE PROTEIN CALORIE MALNUTRITION, FAILURE TO THRIVE - NUTRITIONAL SUPPLEMENT    # HLD  # HTN  # DM   # PARTIALLY BLIND  # S/P LEFT BKA  # HX OF PVD  # LS SPINAL STENOSIS  # GI AND DVT PPX.

## 2022-11-13 NOTE — CHART NOTE - NSCHARTNOTEFT_GEN_A_CORE
Informed by NP that pt needed peripheral IV line done by u/s. Pt had only 1 line previously which had become dislodged. RN had attempted to place line but was not successful. At 5:20 am, Dr. Amaya ([id number]) and I went to place the IV line. Pt refused, and repeatedly shouted "get out" and "leave me alone!"     Primary team please reevaluate

## 2022-11-13 NOTE — PROGRESS NOTE ADULT - SUBJECTIVE AND OBJECTIVE BOX
Patient is a 62y Male with  ESRD  ON  HD    WAS  DIALYZED  YESTERDAY    THREATENED  TO  PULL  HIS NEEDLES OUT  AFTER 1  HOUR  AND 50  MINS  AND    TREATMENT  WAS  TERMINATED  EARLIER    FEELS  OK   TODAY    IS  AMBULATING  IN HIS  ROOM  NO COMPLAINTS AT THE TIME OF  EXAM      fish (Rash)  liver (Anaphylaxis)  No Known Drug Allergies    Hospital Medications:   MEDICATIONS  (STANDING):  amLODIPine   Tablet 10 milliGRAM(s) Oral daily  dexAMETHasone     Tablet 6 milliGRAM(s) Oral daily  epoetin beverley-epbx (RETACRIT) Injectable 00013 Unit(s) IV Push <User Schedule>  hydrALAZINE 25 milliGRAM(s) Oral three times a day  levoFLOXacin  Tablet 250 milliGRAM(s) Oral every 24 hours  metoprolol succinate ER 25 milliGRAM(s) Oral daily  pantoprazole    Tablet 40 milliGRAM(s) Oral two times a day  sodium chloride 0.9% lock flush 3 milliLiter(s) IV Push every 8 hours  sucralfate suspension 1 Gram(s) Oral four times a day    REVIEW OF SYSTEMS:  FEELS  OK  TODAY     AMBULATING    HAS  NO  FEVER/CHILLS     NO  SOB    APPETITE IS GOOD    NO  N/V    MAKES  NO  URINE       VITALS:  T(F): 98.4 (11-13-22 @ 06:25), Max: 98.7 (11-12-22 @ 13:10)  HR: 92 (11-13-22 @ 06:25)  BP: 173/84 (11-13-22 @ 06:25)  RR: 18 (11-13-22 @ 06:25)  SpO2: 99% (11-13-22 @ 06:25)  Wt(kg): --      PHYSICAL EXAM:  Constitutional: NAD  HEENT:  CONJ PALE  Neck: No JVD  Respiratory: CTAB, NO  CRACKLES  Cardiovascular: S1, S2, RRR  Gastrointestinal: BS+, soft, NT/ND  Extremities:  No peripheral edema  R  LEG   L-  HAS  PROSTHESIS  ON  Neurological: A/O x 3,   :  No [place].     Vascular Access: R  UPPER  ARM  AVG    LABS:  11-13    137  |  100  |  36<H>  ----------------------------<  101<H>  4.4   |  26  |  9.79<H>    Ca    8.0<L>      13 Nov 2022 06:48  Phos  3.9     11-13  Mg     2.7     11-13    TPro  6.6  /  Alb  3.2<L>  /  TBili  0.8  /  DBili      /  AST  20  /  ALT  21  /  AlkPhos  74  11-13    Creatinine Trend: 9.79 <--, 12.90 <--                        8.2    3.12  )-----------( 89       ( 13 Nov 2022 06:48 )             25.9     Urine Studies:      RADIOLOGY & ADDITIONAL STUDIES:

## 2022-11-13 NOTE — PROVIDER CONTACT NOTE (MEDICATION) - ACTION/TREATMENT ORDERED:
Pt refused IV multiple times. Resident physician, Dr. Kiran MD made aware and came to attempt with sonographer but pt refused physician. ITZ Rodriges made aware of refusal and advised RN (writer) to push daily IV medications to a later time in the day.

## 2022-11-14 ENCOUNTER — TRANSCRIPTION ENCOUNTER (OUTPATIENT)
Age: 62
End: 2022-11-14

## 2022-11-14 VITALS
OXYGEN SATURATION: 99 % | DIASTOLIC BLOOD PRESSURE: 69 MMHG | SYSTOLIC BLOOD PRESSURE: 134 MMHG | HEART RATE: 78 BPM | TEMPERATURE: 98 F | RESPIRATION RATE: 18 BRPM

## 2022-11-14 LAB
ALBUMIN SERPL ELPH-MCNC: 3.2 G/DL — LOW (ref 3.5–5)
ALP SERPL-CCNC: 86 U/L — SIGNIFICANT CHANGE UP (ref 40–120)
ALT FLD-CCNC: 19 U/L DA — SIGNIFICANT CHANGE UP (ref 10–60)
ANION GAP SERPL CALC-SCNC: 13 MMOL/L — SIGNIFICANT CHANGE UP (ref 5–17)
AST SERPL-CCNC: 15 U/L — SIGNIFICANT CHANGE UP (ref 10–40)
BASOPHILS # BLD AUTO: 0.01 K/UL — SIGNIFICANT CHANGE UP (ref 0–0.2)
BASOPHILS NFR BLD AUTO: 0.3 % — SIGNIFICANT CHANGE UP (ref 0–2)
BILIRUB SERPL-MCNC: 0.6 MG/DL — SIGNIFICANT CHANGE UP (ref 0.2–1.2)
BUN SERPL-MCNC: 50 MG/DL — HIGH (ref 7–18)
CALCIUM SERPL-MCNC: 7.8 MG/DL — LOW (ref 8.4–10.5)
CHLORIDE SERPL-SCNC: 95 MMOL/L — LOW (ref 96–108)
CO2 SERPL-SCNC: 24 MMOL/L — SIGNIFICANT CHANGE UP (ref 22–31)
CREAT SERPL-MCNC: 12 MG/DL — HIGH (ref 0.5–1.3)
EGFR: 4 ML/MIN/1.73M2 — LOW
EOSINOPHIL # BLD AUTO: 0.06 K/UL — SIGNIFICANT CHANGE UP (ref 0–0.5)
EOSINOPHIL NFR BLD AUTO: 1.9 % — SIGNIFICANT CHANGE UP (ref 0–6)
GLUCOSE BLDC GLUCOMTR-MCNC: 143 MG/DL — HIGH (ref 70–99)
GLUCOSE BLDC GLUCOMTR-MCNC: 182 MG/DL — HIGH (ref 70–99)
GLUCOSE SERPL-MCNC: 119 MG/DL — HIGH (ref 70–99)
HCT VFR BLD CALC: 24.8 % — LOW (ref 39–50)
HGB BLD-MCNC: 8 G/DL — LOW (ref 13–17)
IMM GRANULOCYTES NFR BLD AUTO: 0.3 % — SIGNIFICANT CHANGE UP (ref 0–0.9)
LYMPHOCYTES # BLD AUTO: 0.52 K/UL — LOW (ref 1–3.3)
LYMPHOCYTES # BLD AUTO: 16.7 % — SIGNIFICANT CHANGE UP (ref 13–44)
MAGNESIUM SERPL-MCNC: 2.8 MG/DL — HIGH (ref 1.6–2.6)
MCHC RBC-ENTMCNC: 30.3 PG — SIGNIFICANT CHANGE UP (ref 27–34)
MCHC RBC-ENTMCNC: 32.3 GM/DL — SIGNIFICANT CHANGE UP (ref 32–36)
MCV RBC AUTO: 93.9 FL — SIGNIFICANT CHANGE UP (ref 80–100)
MONOCYTES # BLD AUTO: 0.18 K/UL — SIGNIFICANT CHANGE UP (ref 0–0.9)
MONOCYTES NFR BLD AUTO: 5.8 % — SIGNIFICANT CHANGE UP (ref 2–14)
NEUTROPHILS # BLD AUTO: 2.34 K/UL — SIGNIFICANT CHANGE UP (ref 1.8–7.4)
NEUTROPHILS NFR BLD AUTO: 75 % — SIGNIFICANT CHANGE UP (ref 43–77)
NRBC # BLD: 0 /100 WBCS — SIGNIFICANT CHANGE UP (ref 0–0)
PHOSPHATE SERPL-MCNC: 3.8 MG/DL — SIGNIFICANT CHANGE UP (ref 2.5–4.5)
PLATELET # BLD AUTO: 92 K/UL — LOW (ref 150–400)
POTASSIUM SERPL-MCNC: 4.8 MMOL/L — SIGNIFICANT CHANGE UP (ref 3.5–5.3)
POTASSIUM SERPL-SCNC: 4.8 MMOL/L — SIGNIFICANT CHANGE UP (ref 3.5–5.3)
PROT SERPL-MCNC: 7 G/DL — SIGNIFICANT CHANGE UP (ref 6–8.3)
RBC # BLD: 2.64 M/UL — LOW (ref 4.2–5.8)
RBC # FLD: 19 % — HIGH (ref 10.3–14.5)
SODIUM SERPL-SCNC: 132 MMOL/L — LOW (ref 135–145)
WBC # BLD: 3.12 K/UL — LOW (ref 3.8–10.5)
WBC # FLD AUTO: 3.12 K/UL — LOW (ref 3.8–10.5)

## 2022-11-14 PROCEDURE — 83735 ASSAY OF MAGNESIUM: CPT

## 2022-11-14 PROCEDURE — 85610 PROTHROMBIN TIME: CPT

## 2022-11-14 PROCEDURE — 84100 ASSAY OF PHOSPHORUS: CPT

## 2022-11-14 PROCEDURE — 86900 BLOOD TYPING SEROLOGIC ABO: CPT

## 2022-11-14 PROCEDURE — 87340 HEPATITIS B SURFACE AG IA: CPT

## 2022-11-14 PROCEDURE — 86850 RBC ANTIBODY SCREEN: CPT

## 2022-11-14 PROCEDURE — 99261: CPT

## 2022-11-14 PROCEDURE — 85730 THROMBOPLASTIN TIME PARTIAL: CPT

## 2022-11-14 PROCEDURE — 85025 COMPLETE CBC W/AUTO DIFF WBC: CPT

## 2022-11-14 PROCEDURE — 86901 BLOOD TYPING SEROLOGIC RH(D): CPT

## 2022-11-14 PROCEDURE — 84145 PROCALCITONIN (PCT): CPT

## 2022-11-14 PROCEDURE — 85652 RBC SED RATE AUTOMATED: CPT

## 2022-11-14 PROCEDURE — 86923 COMPATIBILITY TEST ELECTRIC: CPT

## 2022-11-14 PROCEDURE — 80053 COMPREHEN METABOLIC PANEL: CPT

## 2022-11-14 PROCEDURE — 86140 C-REACTIVE PROTEIN: CPT

## 2022-11-14 PROCEDURE — 85379 FIBRIN DEGRADATION QUANT: CPT

## 2022-11-14 PROCEDURE — 87040 BLOOD CULTURE FOR BACTERIA: CPT

## 2022-11-14 PROCEDURE — P9040: CPT

## 2022-11-14 PROCEDURE — 82962 GLUCOSE BLOOD TEST: CPT

## 2022-11-14 PROCEDURE — 83615 LACTATE (LD) (LDH) ENZYME: CPT

## 2022-11-14 PROCEDURE — 87635 SARS-COV-2 COVID-19 AMP PRB: CPT

## 2022-11-14 PROCEDURE — 71045 X-RAY EXAM CHEST 1 VIEW: CPT

## 2022-11-14 PROCEDURE — 36430 TRANSFUSION BLD/BLD COMPNT: CPT

## 2022-11-14 PROCEDURE — 93005 ELECTROCARDIOGRAM TRACING: CPT

## 2022-11-14 PROCEDURE — 99285 EMERGENCY DEPT VISIT HI MDM: CPT | Mod: 25

## 2022-11-14 PROCEDURE — 82728 ASSAY OF FERRITIN: CPT

## 2022-11-14 PROCEDURE — 36415 COLL VENOUS BLD VENIPUNCTURE: CPT

## 2022-11-14 RX ORDER — BUDESONIDE AND FORMOTEROL FUMARATE DIHYDRATE 160; 4.5 UG/1; UG/1
2 AEROSOL RESPIRATORY (INHALATION)
Qty: 0 | Refills: 0 | DISCHARGE

## 2022-11-14 RX ORDER — DEXAMETHASONE 0.5 MG/5ML
1 ELIXIR ORAL
Qty: 8 | Refills: 0
Start: 2022-11-14 | End: 2022-11-21

## 2022-11-14 RX ORDER — ERYTHROPOIETIN 10000 [IU]/ML
0 INJECTION, SOLUTION INTRAVENOUS; SUBCUTANEOUS
Qty: 0 | Refills: 0 | DISCHARGE
Start: 2022-11-14

## 2022-11-14 RX ORDER — SUCRALFATE 1 G
10 TABLET ORAL
Qty: 0 | Refills: 0 | DISCHARGE
Start: 2022-11-14

## 2022-11-14 RX ORDER — SUCRALFATE 1 G
10 TABLET ORAL
Qty: 1200 | Refills: 0
Start: 2022-11-14 | End: 2022-12-13

## 2022-11-14 RX ORDER — LEVOFLOXACIN 5 MG/ML
1 INJECTION, SOLUTION INTRAVENOUS
Qty: 5 | Refills: 0
Start: 2022-11-14 | End: 2022-11-18

## 2022-11-14 RX ORDER — ERYTHROPOIETIN 10000 [IU]/ML
10000 INJECTION, SOLUTION INTRAVENOUS; SUBCUTANEOUS
Qty: 0 | Refills: 0 | DISCHARGE
Start: 2022-11-14

## 2022-11-14 RX ADMIN — AMLODIPINE BESYLATE 10 MILLIGRAM(S): 2.5 TABLET ORAL at 06:10

## 2022-11-14 RX ADMIN — Medication 650 MILLIGRAM(S): at 08:30

## 2022-11-14 RX ADMIN — Medication 650 MILLIGRAM(S): at 07:38

## 2022-11-14 RX ADMIN — Medication 650 MILLIGRAM(S): at 00:50

## 2022-11-14 RX ADMIN — Medication 6 MILLIGRAM(S): at 06:10

## 2022-11-14 RX ADMIN — PANTOPRAZOLE SODIUM 40 MILLIGRAM(S): 20 TABLET, DELAYED RELEASE ORAL at 06:10

## 2022-11-14 RX ADMIN — Medication 1 GRAM(S): at 12:07

## 2022-11-14 RX ADMIN — Medication 1 GRAM(S): at 06:09

## 2022-11-14 RX ADMIN — Medication 25 MILLIGRAM(S): at 06:10

## 2022-11-14 RX ADMIN — Medication 1 GRAM(S): at 01:00

## 2022-11-14 NOTE — PROGRESS NOTE ADULT - SUBJECTIVE AND OBJECTIVE BOX
[facility] : Progress Note :: [phone], (office [phone]),   Dr Mosher / Dr Washington / Dr Young / Dr Doll / Dr Varsha TURCIOS / Dr Tello / Dr Garcia / Dr Larry qiu  _____________________________________________________________________________________________    no complains     fish (Rash)  liver (Anaphylaxis)  No Known Drug Allergies    Hospital Medications:   MEDICATIONS  (STANDING):  amLODIPine   Tablet 10 milliGRAM(s) Oral daily  dexAMETHasone     Tablet 6 milliGRAM(s) Oral daily  epoetin beverley-epbx (RETACRIT) Injectable 71970 Unit(s) IV Push <User Schedule>  hydrALAZINE 25 milliGRAM(s) Oral three times a day  levoFLOXacin  Tablet 250 milliGRAM(s) Oral every 24 hours  metoprolol succinate ER 25 milliGRAM(s) Oral daily  pantoprazole    Tablet 40 milliGRAM(s) Oral two times a day  sodium chloride 0.9% lock flush 3 milliLiter(s) IV Push every 8 hours  sucralfate suspension 1 Gram(s) Oral four times a day        VITALS:  T(F): 98 (11-14-22 @ 05:43), Max: 98 (11-14-22 @ 05:43)  HR: 83 (11-14-22 @ 05:43)  BP: 136/78 (11-14-22 @ 05:43)  RR: 18 (11-14-22 @ 05:43)  SpO2: 100% (11-14-22 @ 05:43)  Wt(kg): --      Weight (kg): 54.3 (11-14 @ 05:43)  PHYSICAL EXAM:  Constitutional: NAD  HEENT: anicteric sclera, oropharynx clear  Neck: No JVD  Respiratory: CTAB, no wheezes, rales or rhonchi  Cardiovascular: S1, S2, RRR  Gastrointestinal: BS+, soft, NT/ND  Extremities:  No peripheral edema  Neurological: A/O x 3, no focal deficits  Vascular Access: AVF with thrill and bruit    LABS:  11-14    132<L>  |  95<L>  |  50<H>  ----------------------------<  119<H>  4.8   |  24  |  12.00<H>    Ca    7.8<L>      14 Nov 2022 07:50  Phos  3.8     11-14  Mg     2.8     11-14    TPro  7.0  /  Alb  3.2<L>  /  TBili  0.6  /  DBili      /  AST  15  /  ALT  19  /  AlkPhos  86  11-14    Creatinine Trend: 12.00 <--, 9.79 <--, 12.90 <--                        8.0    3.12  )-----------( 92       ( 14 Nov 2022 07:50 )             24.8     Urine Studies:      RADIOLOGY & ADDITIONAL STUDIES:

## 2022-11-14 NOTE — DISCHARGE NOTE PROVIDER - CARE PROVIDER_API CALL
Dario De La Torre)  Medicine  [address], [address] [place] [address]  Phone: [phone]  Fax: [phone]  Follow Up Time: 1 week

## 2022-11-14 NOTE — DISCHARGE NOTE PROVIDER - NSDCMRMEDTOKEN_GEN_ALL_CORE_FT
ALPRAZolam 0.25 mg oral tablet: 1 tab(s) orally once a day (at bedtime)  aspirin 81 mg oral tablet, chewable: 1 tab(s) orally once a day  Bactroban 2% topical ointment: Apply topically to affected area once a day (at bedtime) to coccyx area  Benadryl Allergy 25 mg oral tablet: 1 tab(s) orally once a day  DuoNeb 0.5 mg-2.5 mg/3 mL inhalation solution: 3 milliliter(s) inhaled 3 times a day  ferrous sulfate 325 mg (65 mg elemental iron) oral tablet: 1 tab(s) orally once a day  folic acid 1 mg oral tablet: 1 tab(s) orally once a day  hydrALAZINE 25 mg oral tablet: 1 tab(s) orally 3 times a day  Levemir 100 units/mL subcutaneous solution: 4 unit(s) subcutaneous 2 times a day  lidocaine 5% patch: 1 patch subcutaneous 2 times a day  Lokelma 10 g oral powder for reconstitution: 1 packet(s) orally once a day every Sun/ Mon/ Wed/ Fri  metoprolol succinate 25 mg oral tablet, extended release: 1 tab(s) orally once a day  Norvasc 10 mg oral tablet: 1 tab(s) orally once a day  ondansetron 4 mg oral tablet: 1 tab(s) orally every 8 hours, As Needed  pantoprazole 40 mg oral delayed release tablet: 1 tab(s) orally 2 times a day  Reglan 10 mg oral tablet: 1 tab(s) orally 3 times a day  Polina-Dior oral tablet: 1 tab(s) orally once a day  Symbicort 160 mcg-4.5 mcg/inh inhalation aerosol: 2 puff(s) inhaled 2 times a day  Symbicort 160 mcg-4.5 mcg/inh inhalation aerosol: 1 puff(s) inhaled 2 times a day  Synthroid 25 mcg (0.025 mg) oral tablet: 1 tab(s) orally once a day  Tums 500 mg oral tablet, chewable: 1 tab(s) orally 2 times a day  Vitamin B-12 1000 mcg oral tablet: 1 tab(s) orally once a day  Vitamin D3 25 mcg (1000 intl units) oral capsule: 1 cap(s) orally once a day  Zocor 40 mg oral tablet: 1 tab(s) orally once a day (at bedtime)   ALPRAZolam 0.25 mg oral tablet: 1 tab(s) orally once a day (at bedtime)  aspirin 81 mg oral tablet, chewable: 1 tab(s) orally once a day  Bactroban 2% topical ointment: Apply topically to affected area once a day (at bedtime) to coccyx area  Benadryl Allergy 25 mg oral tablet: 1 tab(s) orally once a day  dexamethasone 6 mg oral tablet: 1 tab(s) orally once a day  for 8 more days until 11/22  DuoNeb 0.5 mg-2.5 mg/3 mL inhalation solution: 3 milliliter(s) inhaled 3 times a day  epoetin beverley: 12811 unit(s) intravenously 3 times a week Tu, Th, SAt on dialysis days  ferrous sulfate 325 mg (65 mg elemental iron) oral tablet: 1 tab(s) orally once a day  folic acid 1 mg oral tablet: 1 tab(s) orally once a day  hydrALAZINE 25 mg oral tablet: 1 tab(s) orally 3 times a day  Levemir 100 units/mL subcutaneous solution: 4 unit(s) subcutaneous 2 times a day  levoFLOXacin 250 mg oral tablet: 1 tab(s) orally every 24 hours   for 5 more days until 11/19  lidocaine 5% patch: 1 patch subcutaneous 2 times a day  Lokelma 10 g oral powder for reconstitution: 1 packet(s) orally once a day every Sun/ Mon/ Wed/ Fri  metoprolol succinate 25 mg oral tablet, extended release: 1 tab(s) orally once a day  Norvasc 10 mg oral tablet: 1 tab(s) orally once a day  ondansetron 4 mg oral tablet: 1 tab(s) orally every 8 hours, As Needed  pantoprazole 40 mg oral delayed release tablet: 1 tab(s) orally 2 times a day  Reglan 10 mg oral tablet: 1 tab(s) orally 3 times a day  Polina-Dior oral tablet: 1 tab(s) orally once a day  sucralfate 1 g/10 mL oral suspension: 10 milliliter(s) orally 4 times a day  Symbicort 160 mcg-4.5 mcg/inh inhalation aerosol: 1 puff(s) inhaled 2 times a day  Synthroid 25 mcg (0.025 mg) oral tablet: 1 tab(s) orally once a day  Tums 500 mg oral tablet, chewable: 1 tab(s) orally 2 times a day  Vitamin B-12 1000 mcg oral tablet: 1 tab(s) orally once a day  Vitamin D3 25 mcg (1000 intl units) oral capsule: 1 cap(s) orally once a day  Zocor 40 mg oral tablet: 1 tab(s) orally once a day (at bedtime)

## 2022-11-14 NOTE — DISCHARGE NOTE NURSING/CASE MANAGEMENT/SOCIAL WORK - NSDCPEFALRISK_GEN_ALL_CORE
For information on Fall & Injury Prevention, visit: [email or web address] OR  [email or web address] OR  https://www.cdc.gov/steadi/patient.html

## 2022-11-14 NOTE — PROGRESS NOTE ADULT - ASSESSMENT
# ESRD admitted with severe anemia. dialysed on saturday as with hypervolemia. He did not complete HD treatment  Offered HD today but declined  schedule HD in AM   # anemia of CKD. s/p PRBC transfusion. epogen on HD   #hyponatremia- free water restriction  # CKDMBD- phos at goal 
A/P  ESRD  ON  HD    WAS  DIALYZED  YESTERDAY   FOR  1  HR  50  MINS  , DEMANDED  TO  BE  RINSED  BACK  EARLIER     HE  DOES  THAT OFTEN , EVEN  IN  HIS   HD  UNIT     REFUSES  TO  LISTEN  TO ANYONE    VOL  STATUS IS BETTER  TODAY    CAME IN  WITH LOW  HB    S/P  1  U  PRBC, ON  AYAN  WITH HD  TESTED  POSITIVE FOR COVID IS IN  ISOLATION  FOLLOW  BP  ,  WAS  HIGH  EARLIER  TODAY

## 2022-11-14 NOTE — DISCHARGE NOTE NURSING/CASE MANAGEMENT/SOCIAL WORK - PATIENT PORTAL LINK FT
You can access the FollowMyHealth Patient Portal offered by [facility] by registering at the following website: [email or web address]. By joining [employer]s FollowMyHealth portal, you will also be able to view your health information using other applications (apps) compatible with our system.

## 2022-11-14 NOTE — DISCHARGE NOTE PROVIDER - HOSPITAL COURSE
61 y/o Male, coming from [facility], ambulates independently +left BKA w/ prosthetic leg, with PMHx ESRD (T/Th/S, last dialyzed Thursday), Anemia with remote GIB, DM, HTN, HLD, BPH, and PAD, sent from HD center for blood transfusion s/p 1u pRBC in ED, also found to have Hypoxia. Patient refusing NC, NRBM, and BIPAP. Patient being admitted for AHRF. Nephrology, Dr. Washington consulted. Found to be COVID +. ID consulted. started on Azithromycin and Ceftriaxone, Decadron. Patient refusing IV antibiotics, agreeable to PO antibiotics. Patient Hgb stable, medically optimized.    Given clinical course decision made to discharge patient. Discharge discussed with attending.   This is just a brief course for full course please refer to daily progress and consult notes.

## 2022-11-14 NOTE — DISCHARGE NOTE PROVIDER - NSDCHC_MEDRECSTATUS_GEN_ALL_CORE
Admission Reconciliation is Completed  Discharge Reconciliation is Not Complete Pt resting comfortably at time of re-assessment. No events overnight. No events on tele overnight. Pending TTE read and SW in morning. Will continue to monitor. Admission Reconciliation is Completed  Discharge Reconciliation is Completed

## 2022-11-14 NOTE — CONSULT NOTE ADULT - SUBJECTIVE AND OBJECTIVE BOX
HPI:  62M, coming from [facility], ambulates independently +left BKA w/ prosthetic leg, with PMHx ESRD (T/Th/S, last dialyzed Thursday), Anemia with remote GIB, DM, HTN, HLD, BPH, CHF, and PAD, sent from HD center for blood transfusion s/p 1u pRBC in ED, now hypoxic and too unstable for o/p HD. Patient states that previously he was feeling SOB, unable to recall how long after pRBC was when he became SOB, but now states he feels better. Denies allergic reaction to prior blood transfusions. Patient is AOx3 but uncooperative at times, inconsistent historian. Per ED attending, patient noted to be in respiratory distress desaturating 92% on RA, refusing NC, NRBM, and BIPAP. At the time, he endorsed to the ED attending DNR/DNI (not documented/charted), low threshold for intubation/ICU, given Lasix IVP x1 (ESRD on HD, does not make urine), with some improvement. Nephrologist, Dr. Washington was consulted for inpatient HD. Patient denies fevers, chills or recent illness. Patient denies HA, generalized weakness, fatigue, chest pain, SOB, abdominal pain, or changes in BM.   GOC: FULL CODE (12 Nov 2022 14:10)      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      Osteoarthritis      Vision loss of left eye  blind      ESRD on hemodialysis  T/Th/S      Cataract  both eyes - hx of sx done      BPH (benign prostatic hyperplasia)      UTI (urinary tract infection)  hx of      Bladder mass  hx of      H/O hematuria      Osteoporosis      Vision loss of right eye  decreased      Depression      Chronic GERD      Osteomyelitis of vertebra      CHF (congestive heart failure)      Below knee amputation status, left  2012- pt is wearing prostesis      History of right cataract extraction      History of left cataract extraction      S/P arteriovenous (AV) fistula creation  right arm brachiocephalic arteriovenous fistula on 11/08/2018      H/O hematuria  s/p bladder bx and fulguration 2/25/2020      H/O transurethral destruction of bladder lesion  2020      History of excision of mass  back mass on 03/31/2021          fish (Rash)  liver (Anaphylaxis)  No Known Drug Allergies      Meds:  acetaminophen     Tablet .. 650 milliGRAM(s) Oral every 6 hours PRN  amLODIPine   Tablet 10 milliGRAM(s) Oral daily  dexAMETHasone     Tablet 6 milliGRAM(s) Oral daily  epoetin beverley-epbx (RETACRIT) Injectable 38558 Unit(s) IV Push <User Schedule>  hydrALAZINE 25 milliGRAM(s) Oral three times a day  levoFLOXacin  Tablet 250 milliGRAM(s) Oral every 24 hours  melatonin 3 milliGRAM(s) Oral at bedtime PRN  metoprolol succinate ER 25 milliGRAM(s) Oral daily  pantoprazole    Tablet 40 milliGRAM(s) Oral two times a day  sodium chloride 0.9% lock flush 3 milliLiter(s) IV Push every 8 hours  sucralfate suspension 1 Gram(s) Oral four times a day      SOCIAL HISTORY:  Smoker:  YES / NO        PACK YEARS:                         WHEN QUIT?  ETOH use:  YES / NO               FREQUENCY / QUANTITY:  Ilicit Drug use:  YES / NO  Occupation:  Assisted device use (Cane / Walker):  Live with:    FAMILY HISTORY:  Family history of cirrhosis of liver (Mother)    Family history of renal failure (Sibling)    Family history of hypertension (Sibling)    Family history of diabetes mellitus (Sibling)    History of substance abuse in sibling (Sibling)        VITALS:  Vital Signs Last 24 Hrs  T(C): 36.9 (14 Nov 2022 13:03), Max: 36.9 (14 Nov 2022 13:03)  T(F): 98.5 (14 Nov 2022 13:03), Max: 98.5 (14 Nov 2022 13:03)  HR: 78 (14 Nov 2022 13:03) (78 - 87)  BP: 134/69 (14 Nov 2022 13:03) (134/69 - 161/78)  BP(mean): --  RR: 18 (14 Nov 2022 13:03) (17 - 18)  SpO2: 99% (14 Nov 2022 13:03) (97% - 100%)    Parameters below as of 14 Nov 2022 13:03  Patient On (Oxygen Delivery Method): room air        LABS/DIAGNOSTIC TESTS:                          8.0    3.12  )-----------( 92       ( 14 Nov 2022 07:50 )             24.8     WBC Count: 3.12 K/uL (11-14 @ 07:50)  WBC Count: 3.12 K/uL (11-13 @ 06:48)  WBC Count: 4.63 K/uL (11-12 @ 17:20)  WBC Count: 3.15 K/uL (11-12 @ 03:22)      11-14    132<L>  |  95<L>  |  50<H>  ----------------------------<  119<H>  4.8   |  24  |  12.00<H>    Ca    7.8<L>      14 Nov 2022 07:50  Phos  3.8     11-14  Mg     2.8     11-14    TPro  7.0  /  Alb  3.2<L>  /  TBili  0.6  /  DBili  x   /  AST  15  /  ALT  19  /  AlkPhos  86  11-14          LIVER FUNCTIONS - ( 14 Nov 2022 07:50 )  Alb: 3.2 g/dL / Pro: 7.0 g/dL / ALK PHOS: 86 U/L / ALT: 19 U/L DA / AST: 15 U/L / GGT: x                 LACTATE:    ABG -     CULTURES:   .Blood Blood-Peripheral  11-13 @ 06:58   No growth to date.  --  --      .Blood Blood-Peripheral  11-13 @ 06:48   No growth to date.  --  --          RADIOLOGY:< from: Xray Chest 1 View-PORTABLE IMMEDIATE (Xray Chest 1 View-PORTABLE IMMEDIATE .) (11.12.22 @ 13:25) >  ACC: [id number] EXAM:  XR CHEST PORTABLE IMMED 1V                          PROCEDURE DATE:  11/12/2022          INTERPRETATION:  Chest portable    CLINICAL HISTORY: Short of breath    COMPARISON: 10/3/2022    FINDINGS: No change in the heart or mediastinal configuration. Hazy right   mid and lower lung field infiltrates with more patchy right basilar   components is now seen consistent with pneumonia. Left lung is clear. The   angles are sharp.    IMPRESSION: Right mid and lower lung field infiltrates.    --- End of Report ---            ERNESTINA YANG MD; Attending Radiologist  This document has been electronically signed. Nov 12 2022  1:28PM    < end of copied text >        ROS  [  ] UNABLE TO ELICIT               HPI:  62M, coming from [facility], ambulates independently +left BKA w/ prosthetic leg, with PMHx ESRD (T/Th/S, last dialyzed Thursday), Anemia with remote GIB, DM, HTN, HLD, BPH, CHF, and PAD, sent from HD center for blood transfusion s/p 1u pRBC in ED, now hypoxic and too unstable for o/p HD. Patient states that previously he was feeling SOB, unable to recall how long after pRBC was when he became SOB, but now states he feels better. Denies allergic reaction to prior blood transfusions. Patient is AOx3 but uncooperative at times, inconsistent historian. Per ED attending, patient noted to be in respiratory distress desaturating 92% on RA, refusing NC, NRBM, and BIPAP. At the time, he endorsed to the ED attending DNR/DNI (not documented/charted), low threshold for intubation/ICU, given Lasix IVP x1 (ESRD on HD, does not make urine), with some improvement. Nephrologist, Dr. Washington was consulted for inpatient HD. Patient denies fevers, chills or recent illness. Patient denies HA, generalized weakness, fatigue, chest pain, SOB, abdominal pain, or changes in BM.   GOC: FULL CODE (12 Nov 2022 14:10)      History as above, pt who has multiple medical problems and who is from an assisted living facility who was sent in to the hospital to get a blood transfusion, he was found to have COVID here with right lower and midlung infiltrates on his CXR , he has a dry cough and initially was not SOB but is requiring oxygen currently, he is an active smoker and so has a coughing on and off. He denies any chest pain, no nausea, vomiting , diarrhea , urinary symptoms, no loss of taste etc. He was placed on levaquin to cover for bacterial pneumonia given his extensive medical history and is on decadron also. He is clinically doing better and is going to be discharged to a NH today. He has no fevers or chills.       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      Osteoarthritis      Vision loss of left eye  blind      ESRD on hemodialysis  T/Th/S      Cataract  both eyes - hx of sx done      BPH (benign prostatic hyperplasia)      UTI (urinary tract infection)  hx of      Bladder mass  hx of      H/O hematuria      Osteoporosis      Vision loss of right eye  decreased      Depression      Chronic GERD      Osteomyelitis of vertebra      CHF (congestive heart failure)      Below knee amputation status, left  2012- pt is wearing prostesis      History of right cataract extraction      History of left cataract extraction      S/P arteriovenous (AV) fistula creation  right arm brachiocephalic arteriovenous fistula on 11/08/2018      H/O hematuria  s/p bladder bx and fulguration 2/25/2020      H/O transurethral destruction of bladder lesion  2020      History of excision of mass  back mass on 03/31/2021          fish (Rash)  liver (Anaphylaxis)  No Known Drug Allergies      Meds:  acetaminophen     Tablet .. 650 milliGRAM(s) Oral every 6 hours PRN  amLODIPine   Tablet 10 milliGRAM(s) Oral daily  dexAMETHasone     Tablet 6 milliGRAM(s) Oral daily  epoetin beverley-epbx (RETACRIT) Injectable 63276 Unit(s) IV Push <User Schedule>  hydrALAZINE 25 milliGRAM(s) Oral three times a day  levoFLOXacin  Tablet 250 milliGRAM(s) Oral every 24 hours  melatonin 3 milliGRAM(s) Oral at bedtime PRN  metoprolol succinate ER 25 milliGRAM(s) Oral daily  pantoprazole    Tablet 40 milliGRAM(s) Oral two times a day  sodium chloride 0.9% lock flush 3 milliLiter(s) IV Push every 8 hours  sucralfate suspension 1 Gram(s) Oral four times a day      SOCIAL HISTORY:  Smoker:  YES, active smoker  ETOH use:  no  Ilicit Drug use:  denies      FAMILY HISTORY:  Family history of cirrhosis of liver (Mother)    Family history of renal failure (Sibling)    Family history of hypertension (Sibling)    Family history of diabetes mellitus (Sibling)    History of substance abuse in sibling (Sibling)        VITALS:  Vital Signs Last 24 Hrs  T(C): 36.9 (14 Nov 2022 13:03), Max: 36.9 (14 Nov 2022 13:03)  T(F): 98.5 (14 Nov 2022 13:03), Max: 98.5 (14 Nov 2022 13:03)  HR: 78 (14 Nov 2022 13:03) (78 - 87)  BP: 134/69 (14 Nov 2022 13:03) (134/69 - 161/78)  BP(mean): --  RR: 18 (14 Nov 2022 13:03) (17 - 18)  SpO2: 99% (14 Nov 2022 13:03) (97% - 100%)    Parameters below as of 14 Nov 2022 13:03  Patient On (Oxygen Delivery Method): room air        LABS/DIAGNOSTIC TESTS:                          8.0    3.12  )-----------( 92       ( 14 Nov 2022 07:50 )             24.8     WBC Count: 3.12 K/uL (11-14 @ 07:50)  WBC Count: 3.12 K/uL (11-13 @ 06:48)  WBC Count: 4.63 K/uL (11-12 @ 17:20)  WBC Count: 3.15 K/uL (11-12 @ 03:22)      11-14    132<L>  |  95<L>  |  50<H>  ----------------------------<  119<H>  4.8   |  24  |  12.00<H>    Ca    7.8<L>      14 Nov 2022 07:50  Phos  3.8     11-14  Mg     2.8     11-14    TPro  7.0  /  Alb  3.2<L>  /  TBili  0.6  /  DBili  x   /  AST  15  /  ALT  19  /  AlkPhos  86  11-14          LIVER FUNCTIONS - ( 14 Nov 2022 07:50 )  Alb: 3.2 g/dL / Pro: 7.0 g/dL / ALK PHOS: 86 U/L / ALT: 19 U/L DA / AST: 15 U/L / GGT: x                 LACTATE:    ABG -     CULTURES:   .Blood Blood-Peripheral  11-13 @ 06:58   No growth to date.  --  --      .Blood Blood-Peripheral  11-13 @ 06:48   No growth to date.  --  --          RADIOLOGY:< from: Xray Chest 1 View-PORTABLE IMMEDIATE (Xray Chest 1 View-PORTABLE IMMEDIATE .) (11.12.22 @ 13:25) >  ACC: [id number] EXAM:  XR CHEST PORTABLE IMMED 1V                          PROCEDURE DATE:  11/12/2022          INTERPRETATION:  Chest portable    CLINICAL HISTORY: Short of breath    COMPARISON: 10/3/2022    FINDINGS: No change in the heart or mediastinal configuration. Hazy right   mid and lower lung field infiltrates with more patchy right basilar   components is now seen consistent with pneumonia. Left lung is clear. The   angles are sharp.    IMPRESSION: Right mid and lower lung field infiltrates.    --- End of Report ---            ERNESTINA YANG MD; Attending Radiologist  This document has been electronically signed. Nov 12 2022  1:28PM    < end of copied text >        ROS  [  ] UNABLE TO ELICIT

## 2022-11-14 NOTE — DISCHARGE NOTE PROVIDER - NSDCFUSCHEDAPPT_GEN_ALL_CORE_FT
[facility]  GASTRO OP [id number] [facility]  Scheduled Appointment: 11/17/2022     [facility]  VASCULAR [address]  Scheduled Appointment: 02/13/2023    Ambrocio Mason  [facility]  VASCULAR [address]  Scheduled Appointment: 02/13/2023

## 2022-11-14 NOTE — DISCHARGE NOTE PROVIDER - NSDCCPCAREPLAN_GEN_ALL_CORE_FT
PRINCIPAL DISCHARGE DIAGNOSIS  Diagnosis: Acute respiratory failure with hypoxia  Assessment and Plan of Treatment: You presented to the hospital shortness of breath and you were found to have low oxygenation. This is likely due to COVID and Pneumonia that was seen on Chest Xray. You were seen by the infectious disease doctor and they recommended IV antibiotics and oral steroids. You refused IV antibiotics and you were agreeable to oral antibiotics You will be discharged with  Levaquin 250 mg PO  daily for 5 more days.  Dexamethasone 6mg PO for 8 more days.        SECONDARY DISCHARGE DIAGNOSES  Diagnosis: 2019 novel coronavirus disease (COVID-19)  Assessment and Plan of Treatment: CORONAVIRUS INSTRUCTIONS: Based on your current clinical status and stability, it has been determined that you no longer need hospitalization and can recover while remaining in self-quarantine at    Wash your hands often with soap and water for at least 20 seconds, especially after blowing your nose, coughing, or sneezing; going to the bathroom; and before eating or preparing food.6. Cover your mouth and nose with a tissue when you cough or sneeze. Throw used tissues in a lined trash can. Immediately wash your hands with soap and water for at least 20 seconds7. High touch surfaces include counters, tabletops, doorknobs, bathroom fixtures, toilets, phones, keyboards, tablets, and bedside tables.8. Avoid sharing dishes, drinking glasses, cups, eating utensils, towels, or bedding with other people or pets in your home. After using these items, they should be washed thoroughly with soap and water.You are strongly advised to seek prompt medical attention if your illness worsens or you develop new symptoms like fever or difficulty breathing.      Diagnosis: Anemia  Assessment and Plan of Treatment: You presented to the hospital for a transfusion and you were give 1 unit of blood. You responded well to the blood and your hemoglobin remains stable. Your anemis likely due to your End Stage Renal Disease and you will be discharged with Epogen injection that will be given to your during dilaysis to help with your body produce red blood cells.    Diagnosis: ESRD on dialysis  Assessment and Plan of Treatment: You were seen the Nehprologist thomas you were in the hospital and you last received dalysis on Saturday 11/12/22. You will be discharged back to [facility] and you will continue to resume your dialysis session and your usualy facility.    Diagnosis: DM (diabetes mellitus)  Assessment and Plan of Treatment: Your last HgbA1c was on 10/4/22 and it was 5.2, this means your blood sugar has been controlled and you should continue to take your medication as prescribed. Please continue you follow up with your PCP for continued management,  Make sure you get your HgA1c checked every three months.  If you take oral diabetes medications, check your blood glucose two times a day.  If you take insulin, check your blood glucose before meals and at bedtime.  It's important not to skip any meals.  Keep a log of your blood glucose results and always take it with you to your doctor appointments.  Keep a list of your current medications including injectables and over the counter medications and bring this medication list with you to all your doctor appointments.  If you have not seen your ophthalmologist this year call for appointment.  Check your feet daily for redness, sores, or openings. Do not self treat. If no improvement in two days call your primary care physician for an appointment.  Low blood sugar (hypoglycemia) is a blood sugar below 70mg/dl. Check your blood sugar if you feel signs/symptoms of hypoglycemia. If your blood sugar is below 70 take 15 grams of carbohydrates (ex 4 oz of apple juice, 3-4 glucose tablets, or 4-6 oz of regular soda) wait 15 minutes and repeat blood sugar to make sure it comes up above 70.  If your blood sugar is above 70 and you are due for a meal, have a meal.  If you are not due for a meal have a snack.  This snack helps keeps your blood sugar at a safe range.      Diagnosis: HTN (hypertension)  Assessment and Plan of Treatment: Your SBP has been controlled while you have been in the hospital and you should continue to take your medications as prescribed. Please follow up with your PCP for continued management.    Diagnosis: Hypothyroidism  Assessment and Plan of Treatment: Please contineu with your home medications as prescribed. Please follow up with your PCP for continued management.

## 2022-11-14 NOTE — CONSULT NOTE ADULT - ASSESSMENT
A/P    ESRD ON  HD  IS  SCHEDULED  FOR HD  TODAY    EXPLAINED  TO  THE  PT  THAT  HE  HAS  VOL EXCESS AND NEEDS  TO  BE  DIALYZED  HERE    CANT  BE  D/C  TO  HIS  CENTER  AT THIS  TIME    CONSENT  TAKEN  FOR  HD, IN  CHART    WILL  DIALYZE  3 HRS   ADV TO  COMPLETE  HIS  TIME ON  HEMODIALYSIS   2K BATH    UF  GOAL  3000CC       LOW  H/H   WAS  TRANSFUSED 1  U  PRBC  EARLIER    WILL START  RETACRIT  WITH HD    CONT   HOME  MEDS    WILL  FOLLOW   
COVID - 19 infection  ?? Bacterial Pneumonia      Plan - Cont levaquin 250mgs po qd x 7 days in total  Cont decadron 6mgs po qd for a total of 10 days  Cont Oxygen therapy.  DC planning for today if he remains stable.  reconsult prn.

## 2022-11-16 ENCOUNTER — EMERGENCY (EMERGENCY)
Facility: HOSPITAL | Age: 62
LOS: 1 days | Discharge: HOME | End: 2022-11-16
Attending: STUDENT IN AN ORGANIZED HEALTH CARE EDUCATION/TRAINING PROGRAM

## 2022-11-16 VITALS
RESPIRATION RATE: 18 BRPM | OXYGEN SATURATION: 96 % | HEIGHT: 66 IN | HEART RATE: 99 BPM | DIASTOLIC BLOOD PRESSURE: 83 MMHG | TEMPERATURE: 98 F | SYSTOLIC BLOOD PRESSURE: 157 MMHG

## 2022-11-16 DIAGNOSIS — Z98.890 OTHER SPECIFIED POSTPROCEDURAL STATES: Chronic | ICD-10-CM

## 2022-11-16 DIAGNOSIS — Z87.448 PERSONAL HISTORY OF OTHER DISEASES OF URINARY SYSTEM: Chronic | ICD-10-CM

## 2022-11-16 DIAGNOSIS — Z89.512 ACQUIRED ABSENCE OF LEFT LEG BELOW KNEE: Chronic | ICD-10-CM

## 2022-11-16 DIAGNOSIS — Z98.41 CATARACT EXTRACTION STATUS, RIGHT EYE: Chronic | ICD-10-CM

## 2022-11-16 DIAGNOSIS — Z98.42 CATARACT EXTRACTION STATUS, LEFT EYE: Chronic | ICD-10-CM

## 2022-11-16 PROCEDURE — 99283 EMERGENCY DEPT VISIT LOW MDM: CPT

## 2022-11-16 RX ORDER — ACETAMINOPHEN 500 MG
650 TABLET ORAL ONCE
Refills: 0 | Status: DISCONTINUED | OUTPATIENT
Start: 2022-11-16 | End: 2022-11-20

## 2022-11-16 NOTE — ED ADULT NURSE NOTE - OBJECTIVE STATEMENT
BIB from assisted living for right lower leg swelling. Recently seen at [facility] for right lower leg swelling. Left lower leg amputation with prosthesis. Pt continually screams at staff. Pt requesting to urinate and leave/

## 2022-11-16 NOTE — ED PROVIDER NOTE - PATIENT PORTAL LINK FT
You can access the FollowMyHealth Patient Portal offered by [facility] by registering at the following website: [email or web address]. By joining [facility]’s FollowMyHealth portal, you will also be able to view your health information using other applications (apps) compatible with our system.

## 2022-11-16 NOTE — ED PROVIDER NOTE - PHYSICAL EXAMINATION
Patient called said she would like the doctor to order a mammogram. Patient also said she is suppose to have a colorectal screening.    Vital Signs Reviewed  GEN: Comfortable, NAD, AAOx3  HEENT: NCAT, MMM, Neck Supple  RESP: CTAB, No rales/rhonchi/wheezing  CV: RRR, S1S2, No murmurs  ABD: No TTP, Soft, ND, No masses, No CVA Tenderness  Extrem/Skin: Left BKA, right leg with 1+ pitting edema, no skin changes/erythema/induration  Neuro: No focal deficits

## 2022-11-16 NOTE — ED PROVIDER NOTE - NSFOLLOWUPINSTRUCTIONS_ED_ALL_ED_FT
You were seen in the emergency room today for leg pain. Please call your primary doctor to inform them of this ER visit and obtain the next available appointment within the next 5 days. As we discussed, return to the ER if you have any worsening symptoms.    We no longer feel that you need further emergency care or admission to the hospital at this time.    While we have determined that you are currently stable for discharge, we know that things can change. Please seek immediate medical attention or return to the ER if you experience any of the following:  Any worsening or persistent symptoms  Severe Pain  Chest Pain  Difficulty Breathing  Bleeding  Passing Out  Severe Rash  Inability to Eat or Drink  Persistent Fever    Please see a primary care doctor or specialist within 5 days to ensure that you are improving.    Please call the [facility] phone numbers on this document if you have any problems obtaining a follow up appointment.    I wish you well! -Dr Ortez

## 2022-11-16 NOTE — ED PROVIDER NOTE - OBJECTIVE STATEMENT
63 y/o male with history of ESRD, DM, HTN, HLD, p/w chronic right leg pain, asking to be discharged so he can go to dialysis in the morning. Patient states that his right leg has been painful with some swelling for months and denies any history of DVT. Patient denies fever or infectious symptoms, nausea, vomiting, diarrhea, chest pain, shortness of breath, syncope, or any other recent illnesses and hospitalizations.

## 2022-11-16 NOTE — ED PROVIDER NOTE - CLINICAL SUMMARY MEDICAL DECISION MAKING FREE TEXT BOX
Pt p/w R leg pain and swelling for months with no acute nor infectious symptoms. When evaluating pt states that he only wants pain med for chronic pain and d/c so that he can get HD in the morning. Most likely non emergent etiology of symptoms- the details of the case, history, and exam make more emergent diagnoses much less likely. Discussed with pt my clinical impression and results, patient given strict return precautions if persistent or worsening of symptoms occurs, and need for close follow up. Pt expressed understanding and agrees with plan. Pt is well appearing with a reassuring exam. Discharge home with PMD or Specialist f/u within 5 days.

## 2022-11-17 ENCOUNTER — APPOINTMENT (OUTPATIENT)
Dept: GASTROENTEROLOGY | Facility: CLINIC | Age: 62
End: 2022-11-17

## 2022-11-17 ENCOUNTER — INPATIENT (INPATIENT)
Facility: HOSPITAL | Age: 62
LOS: 2 days | Discharge: NURSING, REHAB OR LONG-TERM CARE | DRG: 291 | End: 2022-11-20
Attending: INTERNAL MEDICINE | Admitting: INTERNAL MEDICINE
Payer: MEDICAID

## 2022-11-17 VITALS
OXYGEN SATURATION: 97 % | DIASTOLIC BLOOD PRESSURE: 84 MMHG | TEMPERATURE: 98 F | HEART RATE: 97 BPM | SYSTOLIC BLOOD PRESSURE: 145 MMHG | RESPIRATION RATE: 17 BRPM

## 2022-11-17 VITALS
TEMPERATURE: 98 F | WEIGHT: 119.93 LBS | SYSTOLIC BLOOD PRESSURE: 163 MMHG | RESPIRATION RATE: 18 BRPM | OXYGEN SATURATION: 97 % | DIASTOLIC BLOOD PRESSURE: 86 MMHG | HEART RATE: 92 BPM | HEIGHT: 66 IN

## 2022-11-17 DIAGNOSIS — Z98.890 OTHER SPECIFIED POSTPROCEDURAL STATES: Chronic | ICD-10-CM

## 2022-11-17 DIAGNOSIS — Z89.512 ACQUIRED ABSENCE OF LEFT LEG BELOW KNEE: Chronic | ICD-10-CM

## 2022-11-17 DIAGNOSIS — Z98.42 CATARACT EXTRACTION STATUS, LEFT EYE: Chronic | ICD-10-CM

## 2022-11-17 DIAGNOSIS — E87.1 HYPO-OSMOLALITY AND HYPONATREMIA: ICD-10-CM

## 2022-11-17 DIAGNOSIS — Z87.448 PERSONAL HISTORY OF OTHER DISEASES OF URINARY SYSTEM: Chronic | ICD-10-CM

## 2022-11-17 DIAGNOSIS — Z98.41 CATARACT EXTRACTION STATUS, RIGHT EYE: Chronic | ICD-10-CM

## 2022-11-17 PROBLEM — N18.6 END STAGE RENAL DISEASE: Chronic | Status: ACTIVE | Noted: 2020-02-05

## 2022-11-17 LAB
ALBUMIN SERPL ELPH-MCNC: 3 G/DL — LOW (ref 3.5–5)
ALP SERPL-CCNC: 122 U/L — HIGH (ref 40–120)
ALT FLD-CCNC: 27 U/L DA — SIGNIFICANT CHANGE UP (ref 10–60)
ANION GAP SERPL CALC-SCNC: 11 MMOL/L — SIGNIFICANT CHANGE UP (ref 5–17)
AST SERPL-CCNC: 27 U/L — SIGNIFICANT CHANGE UP (ref 10–40)
BASOPHILS # BLD AUTO: 0 K/UL — SIGNIFICANT CHANGE UP (ref 0–0.2)
BASOPHILS NFR BLD AUTO: 0 % — SIGNIFICANT CHANGE UP (ref 0–2)
BILIRUB SERPL-MCNC: 0.6 MG/DL — SIGNIFICANT CHANGE UP (ref 0.2–1.2)
BUN SERPL-MCNC: 73 MG/DL — HIGH (ref 7–18)
CALCIUM SERPL-MCNC: 7.7 MG/DL — LOW (ref 8.4–10.5)
CHLORIDE SERPL-SCNC: 92 MMOL/L — LOW (ref 96–108)
CO2 SERPL-SCNC: 24 MMOL/L — SIGNIFICANT CHANGE UP (ref 22–31)
CREAT SERPL-MCNC: 14.5 MG/DL — HIGH (ref 0.5–1.3)
EGFR: 3 ML/MIN/1.73M2 — LOW
EOSINOPHIL # BLD AUTO: 0 K/UL — SIGNIFICANT CHANGE UP (ref 0–0.5)
EOSINOPHIL NFR BLD AUTO: 0 % — SIGNIFICANT CHANGE UP (ref 0–6)
GLUCOSE SERPL-MCNC: 399 MG/DL — HIGH (ref 70–99)
HCT VFR BLD CALC: 25.7 % — LOW (ref 39–50)
HGB BLD-MCNC: 8.5 G/DL — LOW (ref 13–17)
IMM GRANULOCYTES NFR BLD AUTO: 0.5 % — SIGNIFICANT CHANGE UP (ref 0–0.9)
LIDOCAIN IGE QN: 185 U/L — SIGNIFICANT CHANGE UP (ref 73–393)
LYMPHOCYTES # BLD AUTO: 0.17 K/UL — LOW (ref 1–3.3)
LYMPHOCYTES # BLD AUTO: 4.4 % — LOW (ref 13–44)
MCHC RBC-ENTMCNC: 30.7 PG — SIGNIFICANT CHANGE UP (ref 27–34)
MCHC RBC-ENTMCNC: 33.1 GM/DL — SIGNIFICANT CHANGE UP (ref 32–36)
MCV RBC AUTO: 92.8 FL — SIGNIFICANT CHANGE UP (ref 80–100)
MONOCYTES # BLD AUTO: 0.18 K/UL — SIGNIFICANT CHANGE UP (ref 0–0.9)
MONOCYTES NFR BLD AUTO: 4.7 % — SIGNIFICANT CHANGE UP (ref 2–14)
NEUTROPHILS # BLD AUTO: 3.46 K/UL — SIGNIFICANT CHANGE UP (ref 1.8–7.4)
NEUTROPHILS NFR BLD AUTO: 90.4 % — HIGH (ref 43–77)
NRBC # BLD: 0 /100 WBCS — SIGNIFICANT CHANGE UP (ref 0–0)
PLATELET # BLD AUTO: 140 K/UL — LOW (ref 150–400)
POTASSIUM SERPL-MCNC: 5.3 MMOL/L — SIGNIFICANT CHANGE UP (ref 3.5–5.3)
POTASSIUM SERPL-SCNC: 5.3 MMOL/L — SIGNIFICANT CHANGE UP (ref 3.5–5.3)
PROT SERPL-MCNC: 6.2 G/DL — SIGNIFICANT CHANGE UP (ref 6–8.3)
RBC # BLD: 2.77 M/UL — LOW (ref 4.2–5.8)
RBC # FLD: 19.3 % — HIGH (ref 10.3–14.5)
SARS-COV-2 RNA SPEC QL NAA+PROBE: SIGNIFICANT CHANGE UP
SODIUM SERPL-SCNC: 127 MMOL/L — LOW (ref 135–145)
WBC # BLD: 3.83 K/UL — SIGNIFICANT CHANGE UP (ref 3.8–10.5)
WBC # FLD AUTO: 3.83 K/UL — SIGNIFICANT CHANGE UP (ref 3.8–10.5)

## 2022-11-17 PROCEDURE — 99285 EMERGENCY DEPT VISIT HI MDM: CPT

## 2022-11-17 NOTE — ED PROVIDER NOTE - NS ED ATTENDING STATEMENT MOD
Detail Level: Detailed Quality 226: Preventive Care And Screening: Tobacco Use: Screening And Cessation Intervention: Patient screened for tobacco use and is an ex/non-smoker Attending Only

## 2022-11-17 NOTE — ED ADULT NURSE NOTE - OBJECTIVE STATEMENT
Patient came to ED with c/o incomplete dialysis today. Patient denies any discomfort, dizziness, n/v. Noted with right upper arm fistula with no bleeding of signs of infection noted.

## 2022-11-17 NOTE — ED PROVIDER NOTE - OBJECTIVE STATEMENT
62 year old male with pmhx esrd, dialysis Tuesday, Thursday, Saturday, diabetes, htn, and covid + 5 days ago, is  here after incomplete dialysis. Patient only had 11 minutes of dialysis because he couldn't tolerate the mask given to him. Reports last dialysis was Tuesday. Denies chest pain, shortness of breath, weakness or dizziness. was seen last night for chronic leg pain. Patient was discharged and went back to assisted living last night. Patient also had respiratory failure with hypoxia last week.   NKDA.

## 2022-11-17 NOTE — ED ADULT NURSE NOTE - ED STAT RN HANDOFF DETAILS
Patient quietly resting on bed, alert and oriented x 3, not in any form of distress. Awaiting for admission bed. Endorsed to MICHI Nichols for continuity of care.
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
## 2022-11-17 NOTE — ED ADULT NURSE REASSESSMENT NOTE - NS ED NURSE REASSESS COMMENT FT1
pt continually roaming halls, refusing to sit in chair or stretcher. Pt found smoking in bathroom. Code gray called. House sup at bedside, cigarettes and lighter taken by security. Pending ambulette back to assisted living. PT moved to front of charge nurse

## 2022-11-17 NOTE — ED PROVIDER NOTE - CLINICAL SUMMARY MEDICAL DECISION MAKING FREE TEXT BOX
62 year old male on dialysis is here after incomplete dialysis. Patient is in no acute distress. Given pmhx of respiratory failure, will perform labs and admission. Discussed with pmd Yeturu.

## 2022-11-18 ENCOUNTER — TRANSCRIPTION ENCOUNTER (OUTPATIENT)
Age: 62
End: 2022-11-18

## 2022-11-18 DIAGNOSIS — Z29.9 ENCOUNTER FOR PROPHYLACTIC MEASURES, UNSPECIFIED: ICD-10-CM

## 2022-11-18 DIAGNOSIS — E11.9 TYPE 2 DIABETES MELLITUS WITHOUT COMPLICATIONS: ICD-10-CM

## 2022-11-18 DIAGNOSIS — E78.5 HYPERLIPIDEMIA, UNSPECIFIED: ICD-10-CM

## 2022-11-18 DIAGNOSIS — D64.9 ANEMIA, UNSPECIFIED: ICD-10-CM

## 2022-11-18 DIAGNOSIS — I25.10 ATHEROSCLEROTIC HEART DISEASE OF NATIVE CORONARY ARTERY WITHOUT ANGINA PECTORIS: ICD-10-CM

## 2022-11-18 DIAGNOSIS — N18.6 END STAGE RENAL DISEASE: ICD-10-CM

## 2022-11-18 DIAGNOSIS — I10 ESSENTIAL (PRIMARY) HYPERTENSION: ICD-10-CM

## 2022-11-18 DIAGNOSIS — I50.9 HEART FAILURE, UNSPECIFIED: ICD-10-CM

## 2022-11-18 DIAGNOSIS — U07.1 COVID-19: ICD-10-CM

## 2022-11-18 DIAGNOSIS — J18.9 PNEUMONIA, UNSPECIFIED ORGANISM: ICD-10-CM

## 2022-11-18 DIAGNOSIS — N40.0 BENIGN PROSTATIC HYPERPLASIA WITHOUT LOWER URINARY TRACT SYMPTOMS: ICD-10-CM

## 2022-11-18 DIAGNOSIS — N18.9 CHRONIC KIDNEY DISEASE, UNSPECIFIED: ICD-10-CM

## 2022-11-18 LAB
ANION GAP SERPL CALC-SCNC: 14 MMOL/L — SIGNIFICANT CHANGE UP (ref 5–17)
BUN SERPL-MCNC: 76 MG/DL — HIGH (ref 7–18)
CALCIUM SERPL-MCNC: 7.7 MG/DL — LOW (ref 8.4–10.5)
CHLORIDE SERPL-SCNC: 93 MMOL/L — LOW (ref 96–108)
CO2 SERPL-SCNC: 24 MMOL/L — SIGNIFICANT CHANGE UP (ref 22–31)
CREAT SERPL-MCNC: 14.8 MG/DL — HIGH (ref 0.5–1.3)
CULTURE RESULTS: SIGNIFICANT CHANGE UP
CULTURE RESULTS: SIGNIFICANT CHANGE UP
EGFR: 3 ML/MIN/1.73M2 — LOW
GLUCOSE BLDC GLUCOMTR-MCNC: 224 MG/DL — HIGH (ref 70–99)
GLUCOSE BLDC GLUCOMTR-MCNC: 229 MG/DL — HIGH (ref 70–99)
GLUCOSE BLDC GLUCOMTR-MCNC: 244 MG/DL — HIGH (ref 70–99)
GLUCOSE BLDC GLUCOMTR-MCNC: 271 MG/DL — HIGH (ref 70–99)
GLUCOSE BLDC GLUCOMTR-MCNC: 283 MG/DL — HIGH (ref 70–99)
GLUCOSE BLDC GLUCOMTR-MCNC: 290 MG/DL — HIGH (ref 70–99)
GLUCOSE SERPL-MCNC: 295 MG/DL — HIGH (ref 70–99)
HCT VFR BLD CALC: 24.7 % — LOW (ref 39–50)
HGB BLD-MCNC: 8 G/DL — LOW (ref 13–17)
MAGNESIUM SERPL-MCNC: 3 MG/DL — HIGH (ref 1.6–2.6)
MCHC RBC-ENTMCNC: 30.2 PG — SIGNIFICANT CHANGE UP (ref 27–34)
MCHC RBC-ENTMCNC: 32.4 GM/DL — SIGNIFICANT CHANGE UP (ref 32–36)
MCV RBC AUTO: 93.2 FL — SIGNIFICANT CHANGE UP (ref 80–100)
NRBC # BLD: 0 /100 WBCS — SIGNIFICANT CHANGE UP (ref 0–0)
PHOSPHATE SERPL-MCNC: 5.2 MG/DL — HIGH (ref 2.5–4.5)
PLATELET # BLD AUTO: 117 K/UL — LOW (ref 150–400)
POTASSIUM SERPL-MCNC: 4.9 MMOL/L — SIGNIFICANT CHANGE UP (ref 3.5–5.3)
POTASSIUM SERPL-SCNC: 4.9 MMOL/L — SIGNIFICANT CHANGE UP (ref 3.5–5.3)
RBC # BLD: 2.65 M/UL — LOW (ref 4.2–5.8)
RBC # FLD: 19.3 % — HIGH (ref 10.3–14.5)
SODIUM SERPL-SCNC: 131 MMOL/L — LOW (ref 135–145)
SPECIMEN SOURCE: SIGNIFICANT CHANGE UP
SPECIMEN SOURCE: SIGNIFICANT CHANGE UP
WBC # BLD: 3.91 K/UL — SIGNIFICANT CHANGE UP (ref 3.8–10.5)
WBC # FLD AUTO: 3.91 K/UL — SIGNIFICANT CHANGE UP (ref 3.8–10.5)

## 2022-11-18 RX ORDER — ALPRAZOLAM 0.25 MG
0.25 TABLET ORAL AT BEDTIME
Refills: 0 | Status: DISCONTINUED | OUTPATIENT
Start: 2022-11-18 | End: 2022-11-20

## 2022-11-18 RX ORDER — ASPIRIN/CALCIUM CARB/MAGNESIUM 324 MG
81 TABLET ORAL DAILY
Refills: 0 | Status: DISCONTINUED | OUTPATIENT
Start: 2022-11-18 | End: 2022-11-20

## 2022-11-18 RX ORDER — LANOLIN ALCOHOL/MO/W.PET/CERES
3 CREAM (GRAM) TOPICAL AT BEDTIME
Refills: 0 | Status: DISCONTINUED | OUTPATIENT
Start: 2022-11-18 | End: 2022-11-20

## 2022-11-18 RX ORDER — PANTOPRAZOLE SODIUM 20 MG/1
40 TABLET, DELAYED RELEASE ORAL
Refills: 0 | Status: DISCONTINUED | OUTPATIENT
Start: 2022-11-18 | End: 2022-11-20

## 2022-11-18 RX ORDER — ONDANSETRON 8 MG/1
4 TABLET, FILM COATED ORAL EVERY 8 HOURS
Refills: 0 | Status: DISCONTINUED | OUTPATIENT
Start: 2022-11-18 | End: 2022-11-20

## 2022-11-18 RX ORDER — BUDESONIDE AND FORMOTEROL FUMARATE DIHYDRATE 160; 4.5 UG/1; UG/1
2 AEROSOL RESPIRATORY (INHALATION)
Refills: 0 | Status: DISCONTINUED | OUTPATIENT
Start: 2022-11-18 | End: 2022-11-20

## 2022-11-18 RX ORDER — HEPARIN SODIUM 5000 [USP'U]/ML
5000 INJECTION INTRAVENOUS; SUBCUTANEOUS EVERY 12 HOURS
Refills: 0 | Status: DISCONTINUED | OUTPATIENT
Start: 2022-11-18 | End: 2022-11-20

## 2022-11-18 RX ORDER — SUCRALFATE 1 G
1 TABLET ORAL
Refills: 0 | Status: DISCONTINUED | OUTPATIENT
Start: 2022-11-18 | End: 2022-11-20

## 2022-11-18 RX ORDER — AMLODIPINE BESYLATE 2.5 MG/1
10 TABLET ORAL DAILY
Refills: 0 | Status: DISCONTINUED | OUTPATIENT
Start: 2022-11-18 | End: 2022-11-20

## 2022-11-18 RX ORDER — ERYTHROPOIETIN 10000 [IU]/ML
10000 INJECTION, SOLUTION INTRAVENOUS; SUBCUTANEOUS
Refills: 0 | Status: DISCONTINUED | OUTPATIENT
Start: 2022-11-18 | End: 2022-11-20

## 2022-11-18 RX ORDER — FOLIC ACID 0.8 MG
1 TABLET ORAL DAILY
Refills: 0 | Status: DISCONTINUED | OUTPATIENT
Start: 2022-11-18 | End: 2022-11-20

## 2022-11-18 RX ORDER — LEVOTHYROXINE SODIUM 125 MCG
25 TABLET ORAL DAILY
Refills: 0 | Status: DISCONTINUED | OUTPATIENT
Start: 2022-11-18 | End: 2022-11-20

## 2022-11-18 RX ORDER — INSULIN GLARGINE 100 [IU]/ML
4 INJECTION, SOLUTION SUBCUTANEOUS
Refills: 0 | Status: DISCONTINUED | OUTPATIENT
Start: 2022-11-18 | End: 2022-11-20

## 2022-11-18 RX ORDER — INSULIN LISPRO 100/ML
VIAL (ML) SUBCUTANEOUS
Refills: 0 | Status: DISCONTINUED | OUTPATIENT
Start: 2022-11-18 | End: 2022-11-20

## 2022-11-18 RX ORDER — TIOTROPIUM BROMIDE 18 UG/1
1 CAPSULE ORAL; RESPIRATORY (INHALATION) DAILY
Refills: 0 | Status: DISCONTINUED | OUTPATIENT
Start: 2022-11-18 | End: 2022-11-20

## 2022-11-18 RX ORDER — SIMVASTATIN 20 MG/1
40 TABLET, FILM COATED ORAL AT BEDTIME
Refills: 0 | Status: DISCONTINUED | OUTPATIENT
Start: 2022-11-18 | End: 2022-11-20

## 2022-11-18 RX ORDER — CHOLECALCIFEROL (VITAMIN D3) 125 MCG
1000 CAPSULE ORAL DAILY
Refills: 0 | Status: DISCONTINUED | OUTPATIENT
Start: 2022-11-18 | End: 2022-11-20

## 2022-11-18 RX ORDER — ALBUTEROL 90 UG/1
2 AEROSOL, METERED ORAL EVERY 6 HOURS
Refills: 0 | Status: DISCONTINUED | OUTPATIENT
Start: 2022-11-18 | End: 2022-11-20

## 2022-11-18 RX ORDER — FERROUS SULFATE 325(65) MG
325 TABLET ORAL DAILY
Refills: 0 | Status: DISCONTINUED | OUTPATIENT
Start: 2022-11-18 | End: 2022-11-20

## 2022-11-18 RX ORDER — HYDRALAZINE HCL 50 MG
25 TABLET ORAL THREE TIMES A DAY
Refills: 0 | Status: DISCONTINUED | OUTPATIENT
Start: 2022-11-18 | End: 2022-11-20

## 2022-11-18 RX ORDER — CHLORHEXIDINE GLUCONATE 213 G/1000ML
1 SOLUTION TOPICAL
Refills: 0 | Status: DISCONTINUED | OUTPATIENT
Start: 2022-11-18 | End: 2022-11-20

## 2022-11-18 RX ORDER — PREGABALIN 225 MG/1
1000 CAPSULE ORAL DAILY
Refills: 0 | Status: DISCONTINUED | OUTPATIENT
Start: 2022-11-18 | End: 2022-11-20

## 2022-11-18 RX ORDER — CALCIUM CARBONATE 500(1250)
1 TABLET ORAL
Refills: 0 | Status: DISCONTINUED | OUTPATIENT
Start: 2022-11-18 | End: 2022-11-20

## 2022-11-18 RX ORDER — INSULIN LISPRO 100/ML
VIAL (ML) SUBCUTANEOUS AT BEDTIME
Refills: 0 | Status: DISCONTINUED | OUTPATIENT
Start: 2022-11-18 | End: 2022-11-20

## 2022-11-18 RX ORDER — ACETAMINOPHEN 500 MG
650 TABLET ORAL EVERY 6 HOURS
Refills: 0 | Status: DISCONTINUED | OUTPATIENT
Start: 2022-11-18 | End: 2022-11-20

## 2022-11-18 RX ORDER — DEXAMETHASONE 0.5 MG/5ML
6 ELIXIR ORAL DAILY
Refills: 0 | Status: DISCONTINUED | OUTPATIENT
Start: 2022-11-18 | End: 2022-11-20

## 2022-11-18 RX ORDER — SODIUM ZIRCONIUM CYCLOSILICATE 10 G/10G
10 POWDER, FOR SUSPENSION ORAL
Refills: 0 | Status: DISCONTINUED | OUTPATIENT
Start: 2022-11-18 | End: 2022-11-20

## 2022-11-18 RX ORDER — METOPROLOL TARTRATE 50 MG
25 TABLET ORAL DAILY
Refills: 0 | Status: DISCONTINUED | OUTPATIENT
Start: 2022-11-18 | End: 2022-11-20

## 2022-11-18 RX ADMIN — Medication 1000 UNIT(S): at 12:49

## 2022-11-18 RX ADMIN — Medication 1 TABLET(S): at 12:49

## 2022-11-18 RX ADMIN — ERYTHROPOIETIN 10000 UNIT(S): 10000 INJECTION, SOLUTION INTRAVENOUS; SUBCUTANEOUS at 18:14

## 2022-11-18 RX ADMIN — Medication 1 GRAM(S): at 06:35

## 2022-11-18 RX ADMIN — Medication 25 MILLIGRAM(S): at 06:35

## 2022-11-18 RX ADMIN — ALBUTEROL 2 PUFF(S): 90 AEROSOL, METERED ORAL at 05:13

## 2022-11-18 RX ADMIN — Medication 1 GRAM(S): at 23:09

## 2022-11-18 RX ADMIN — Medication 1 TABLET(S): at 06:54

## 2022-11-18 RX ADMIN — ALBUTEROL 2 PUFF(S): 90 AEROSOL, METERED ORAL at 09:05

## 2022-11-18 RX ADMIN — AMLODIPINE BESYLATE 10 MILLIGRAM(S): 2.5 TABLET ORAL at 06:35

## 2022-11-18 RX ADMIN — INSULIN GLARGINE 4 UNIT(S): 100 INJECTION, SOLUTION SUBCUTANEOUS at 17:41

## 2022-11-18 RX ADMIN — Medication 3: at 09:07

## 2022-11-18 RX ADMIN — Medication 0.25 MILLIGRAM(S): at 21:40

## 2022-11-18 RX ADMIN — Medication 25 MILLIGRAM(S): at 06:34

## 2022-11-18 RX ADMIN — Medication 1 TABLET(S): at 17:41

## 2022-11-18 RX ADMIN — Medication 1 MILLIGRAM(S): at 12:47

## 2022-11-18 RX ADMIN — Medication 1 GRAM(S): at 17:41

## 2022-11-18 RX ADMIN — Medication 3: at 12:47

## 2022-11-18 RX ADMIN — Medication 325 MILLIGRAM(S): at 12:49

## 2022-11-18 RX ADMIN — Medication 1 GRAM(S): at 12:47

## 2022-11-18 RX ADMIN — PREGABALIN 1000 MICROGRAM(S): 225 CAPSULE ORAL at 12:47

## 2022-11-18 RX ADMIN — SIMVASTATIN 40 MILLIGRAM(S): 20 TABLET, FILM COATED ORAL at 21:41

## 2022-11-18 RX ADMIN — Medication 81 MILLIGRAM(S): at 12:47

## 2022-11-18 RX ADMIN — INSULIN GLARGINE 4 UNIT(S): 100 INJECTION, SOLUTION SUBCUTANEOUS at 09:06

## 2022-11-18 RX ADMIN — Medication 25 MICROGRAM(S): at 06:35

## 2022-11-18 RX ADMIN — BUDESONIDE AND FORMOTEROL FUMARATE DIHYDRATE 2 PUFF(S): 160; 4.5 AEROSOL RESPIRATORY (INHALATION) at 12:58

## 2022-11-18 RX ADMIN — Medication 25 MILLIGRAM(S): at 21:40

## 2022-11-18 RX ADMIN — Medication 2: at 17:41

## 2022-11-18 RX ADMIN — Medication 6 MILLIGRAM(S): at 06:35

## 2022-11-18 NOTE — H&P ADULT - PROBLEM SELECTOR PLAN 3
h/o DM on Levemir 4u BID  last A1c 5.2 in october 2022  c/w 4u lantus and SSI  FS [facility] h/o DM on Levemir 4u BID   on admission  last A1c 5.2 in october 2022  c/w 4u lantus and SSI  FS [facility]

## 2022-11-18 NOTE — H&P ADULT - SOCIAL HISTORY: SUBSTANCE USE
Discharge teaching done with pt and pt's mother, verbalized understanding. Pt states she feels much better at time of discharge. Skin p/w/d, respirations easy, unlabored. No prescriptions given. Instructed to follow up with primary doctor for recheck but return to ER for any worsening condition. Pt's mother denies further questions or concerns at time of discharge. Pt ambulates out with steady gait with mother.    none

## 2022-11-18 NOTE — H&P ADULT - PROBLEM SELECTOR PLAN 7
c/w levaquin to complete abx course for PNA per prev admision c/w levaquin to complete abx course for PNA per prev admission

## 2022-11-18 NOTE — PATIENT PROFILE ADULT - FALL HARM RISK - RISK INTERVENTIONS

## 2022-11-18 NOTE — H&P ADULT - PROBLEM SELECTOR PLAN 1
s/p incomplete dialysis session  Cr 14.50 on adm  n indication for emergency dialysis at time of admission   c/w HD per nephro  Nephro Zoltan following s/p incomplete dialysis session  Cr 14.50 on adm  pt appears euvolemic on exam, not in respiratory distress, electrolytes acceptable.  no indication for emergency dialysis at time of admission   c/w HD per nephro  Nephro Njeru consulted

## 2022-11-18 NOTE — DISCHARGE NOTE PROVIDER - NSDCMRMEDTOKEN_GEN_ALL_CORE_FT
ALPRAZolam 0.25 mg oral tablet: 1 tab(s) orally once a day (at bedtime)  aspirin 81 mg oral tablet, chewable: 1 tab(s) orally once a day  Bactroban 2% topical ointment: Apply topically to affected area once a day (at bedtime) to coccyx area  Benadryl Allergy 25 mg oral tablet: 1 tab(s) orally once a day  dexamethasone 6 mg oral tablet: 1 tab(s) orally once a day  for 8 more days until 11/22  DuoNeb 0.5 mg-2.5 mg/3 mL inhalation solution: 3 milliliter(s) inhaled 3 times a day  epoetin beverley: 65573 unit(s) intravenously 3 times a week Tu, Th, SAt on dialysis days  ferrous sulfate 325 mg (65 mg elemental iron) oral tablet: 1 tab(s) orally once a day  folic acid 1 mg oral tablet: 1 tab(s) orally once a day  hydrALAZINE 25 mg oral tablet: 1 tab(s) orally 3 times a day  Levemir 100 units/mL subcutaneous solution: 4 unit(s) subcutaneous 2 times a day  levoFLOXacin 250 mg oral tablet: 1 tab(s) orally every 24 hours   for 5 more days until 11/19  lidocaine 5% patch: 1 patch subcutaneous 2 times a day  Lokelma 10 g oral powder for reconstitution: 1 packet(s) orally once a day every Sun/ Mon/ Wed/ Fri  metoprolol succinate 25 mg oral tablet, extended release: 1 tab(s) orally once a day  Norvasc 10 mg oral tablet: 1 tab(s) orally once a day  ondansetron 4 mg oral tablet: 1 tab(s) orally every 8 hours, As Needed  pantoprazole 40 mg oral delayed release tablet: 1 tab(s) orally 2 times a day  Reglan 10 mg oral tablet: 1 tab(s) orally 3 times a day  Polina-Dior oral tablet: 1 tab(s) orally once a day  sucralfate 1 g/10 mL oral suspension: 10 milliliter(s) orally 4 times a day  Symbicort 160 mcg-4.5 mcg/inh inhalation aerosol: 1 puff(s) inhaled 2 times a day  Synthroid 25 mcg (0.025 mg) oral tablet: 1 tab(s) orally once a day  Tums 500 mg oral tablet, chewable: 1 tab(s) orally 2 times a day  Vitamin B-12 1000 mcg oral tablet: 1 tab(s) orally once a day  Vitamin D3 25 mcg (1000 intl units) oral capsule: 1 cap(s) orally once a day  Zocor 40 mg oral tablet: 1 tab(s) orally once a day (at bedtime)

## 2022-11-18 NOTE — CONSULT NOTE ADULT - SUBJECTIVE AND OBJECTIVE BOX
[facility] : Progress Note :: [phone], (office [phone]),   Dr Mosher / Dr Washington / Dr Young / Dr Doll / Dr Varsha TURCIOS / Dr Tello / Dr Garcia / Dr Larry qiu  _____________________________________________________________________________________________  Patient is a 62y Male whom presented to the hospital with SOB. Admitted in this hospital earlier this week with COVID 19.  He did not complete HD treatment on Thursday. Presented to ED with SOB and bilateral leg edema.  Renal consulted for HD     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      Osteoarthritis      Vision loss of left eye  blind      ESRD on hemodialysis  T/Th/S      Cataract  both eyes - hx of sx done      BPH (benign prostatic hyperplasia)      UTI (urinary tract infection)  hx of      Bladder mass  hx of      H/O hematuria      Osteoporosis      Vision loss of right eye  decreased      Depression      Chronic GERD      Osteomyelitis of vertebra      CHF (congestive heart failure)      Below knee amputation status, left  2012- pt is wearing prostesis      History of right cataract extraction      History of left cataract extraction      S/P arteriovenous (AV) fistula creation  right arm brachiocephalic arteriovenous fistula on 11/08/2018      H/O hematuria  s/p bladder bx and fulguration 2/25/2020      H/O transurethral destruction of bladder lesion  2020      History of excision of mass  back mass on 03/31/2021        fish (Rash)  liver (Anaphylaxis)  No Known Drug Allergies    Home Medications Reviewed  Hospital Medications:   MEDICATIONS  (STANDING):  acetaminophen     Tablet .. 650 milliGRAM(s) Oral once    SOCIAL HISTORY:  Denies ETOh,Smoking,   FAMILY HISTORY:  Family history of cirrhosis of liver (Mother)    Family history of renal failure (Sibling)    Family history of hypertension (Sibling)    Family history of diabetes mellitus (Sibling)    History of substance abuse in sibling (Sibling)      REVIEW OF SYSTEMS:  CONSTITUTIONAL: No weakness, fevers or chills  EYES/ENT: No visual changes;  No vertigo or throat pain   NECK: No pain or stiffness  RESPIRATORY: No cough, wheezing, hemoptysis; No shortness of breath  CARDIOVASCULAR: No chest pain or palpitations.  GASTROINTESTINAL: No abdominal or epigastric pain. No nausea, vomiting, or hematemesis; No diarrhea or constipation. No melena or hematochezia.  GENITOURINARY: No dysuria, frequency, foamy urine, urinary urgency, incontinence or hematuria  NEUROLOGICAL: No numbness or weakness  SKIN: No itching, burning, rashes, or lesions   VASCULAR: No bilateral lower extremity edema.   All other review of systems is negative unless indicated above.    VITALS:  T(F): 98.5 (11-18-22 @ 14:13), Max: 98.5 (11-18-22 @ 14:13)  HR: 82 (11-18-22 @ 14:13)  BP: 132/60 (11-18-22 @ 14:13)  RR: 17 (11-18-22 @ 14:13)  SpO2: 95% (11-18-22 @ 14:13)  Wt(kg): --    Height (cm): 167.6 (11-17 @ 18:30)  Weight (kg): 54.4 (11-17 @ 18:30)  BMI (kg/m2): 19.4 (11-17 @ 18:30)  BSA (m2): 1.61 (11-17 @ 18:30)  PHYSICAL EXAM:  Constitutional: NAD  HEENT: anicteric sclera, oropharynx clear, MMM  Neck: No JVD  Respiratory: CTAB, no wheezes, rales or rhonchi  Cardiovascular: S1, S2, RRR  Gastrointestinal: BS+, soft, NT/ND  Extremities: No cyanosis or clubbing. No peripheral edema  Neurological: A/O x 3, no focal deficits  Psychiatric: Normal mood, normal affect  : No CVA tenderness. No [place].   Skin: No rashes  Vascular Access:    LABS:  11-18    131<L>  |  93<L>  |  76<H>  ----------------------------<  295<H>  4.9   |  24  |  14.80<H>    Ca    7.7<L>      18 Nov 2022 05:20  Phos  5.2     11-18  Mg     3.0     11-18    TPro  6.2  /  Alb  3.0<L>  /  TBili  0.6  /  DBili      /  AST  27  /  ALT  27  /  AlkPhos  122<H>  11-17    Creatinine Trend: 14.80 <--, 14.50 <--, 12.00 <--, 9.79 <--, 12.90 <--                        8.0    3.91  )-----------( 117      ( 18 Nov 2022 05:20 )             24.7     Urine Studies:      RADIOLOGY & ADDITIONAL STUDIES:

## 2022-11-18 NOTE — DISCHARGE NOTE PROVIDER - HOSPITAL COURSE
62M PMHx ESRD (T/Th/S, last dialyzed Thursday), Anemia, GIB, DM, HTN, HLD, BPH, CHF, PAD, s/p L BKA presents after incomplete dialysis session. Admitted to medicine for dialysis. Pt underwent HD Friday 11/18 and Saturday 11/19. Pt tolerated HD?????    Pt for dc back to [facility] on Sunday 11/19 62M PMHx ESRD (T/Th/S, last dialyzed Thursday), Anemia, GIB, DM, HTN, HLD, BPH, CHF, PAD, s/p L BKA presents after incomplete dialysis session. Admitted to medicine for dialysis. Pt underwent HD Friday 11/18 and Saturday 11/19. Pt tolerated HD on Friday.     Patient is able to ambulate and tolerate diet prior to discharge. Patient is stable for discharge per attending and is advised to follow up with PCP as outpatient. Please refer to patient's complete medical chart with documents for a full hospital course, for this is only a brief summary.

## 2022-11-18 NOTE — CONSULT NOTE ADULT - ASSESSMENT
Patient is a 62y Male whom presented to the hospital with SOB. Admitted in this hospital earlier this week with COVID 19.  He did not complete HD treatment on Thursday. Presented to ED with SOB and bilateral leg edema.  Renal consulted for HD     # ESRD. He is fluid overloaded. HD today with UF as tolerated  # anemia of CKD. epogen on HD   # CKD MBD. resume sevelamer 800mg three times per day ac    thanks for the consult

## 2022-11-18 NOTE — DISCHARGE NOTE PROVIDER - CARE PROVIDER_API CALL
Dario De La Torre)  Medicine  [address], [address] [place] [address]  Phone: [phone]  Fax: [phone]  Follow Up Time: 1 week

## 2022-11-18 NOTE — DISCHARGE NOTE PROVIDER - NSDCFUSCHEDAPPT_GEN_ALL_CORE_FT
[facility]  VASCULAR [address]  Scheduled Appointment: 02/13/2023    Ambrocio Mason  [facility]  VASCULAR [address]  Scheduled Appointment: 02/13/2023

## 2022-11-18 NOTE — ED ADULT NURSE REASSESSMENT NOTE - NS ED NURSE REASSESS COMMENT FT1
Received pt asleep , refused vital signs and FSBS , refused to take Protonix before going to the floor   Pt was transported to [facility] , in no acute distress, no complaints voiced

## 2022-11-18 NOTE — DISCHARGE NOTE PROVIDER - NSDCCPCAREPLAN_GEN_ALL_CORE_FT
PRINCIPAL DISCHARGE DIAGNOSIS  Diagnosis: ESRD needing dialysis  Assessment and Plan of Treatment: You came in for dialysis. You had Dialysis on Friday 11/18 and Saturday 11/19. Please follow up with your primary care physician in one week to inform them of your recent hospitalization and further management of your medical conditions.         PRINCIPAL DISCHARGE DIAGNOSIS  Diagnosis: ESRD needing dialysis  Assessment and Plan of Treatment: You came in for dialysis. You had Dialysis on Friday 11/18.  You were seen by Nephrologist Dr. Mosher and his recommendations were followed.  You are schudled for a dialysis session at your assisted living facilty tomorrow.   Please follow up with your primary care physician in one week to inform them of your recent hospitalization and further management of your medical conditions.

## 2022-11-18 NOTE — H&P ADULT - ATTENDING COMMENTS
62 year old male, from [facility], ambulates independently +left BKA w/ prosthetic leg, with PMHx ESRD (T/Th/S), Anemia with remote GIB, DM, HTN, HLD, BPH, CHF, and PAD presents after incomplete dialysis session. Patient states that he only had 11 minutes of dialysis because he couldn't tolerate the mask given to him. Reports last full dialysis was Tuesday. Patient denies chest pain, shortness of breath, weakness or dizziness. Was seen last night in the ED for chronic leg pain and with multiple admission to [facility] for various medical problems, last discharged last weekend for AHRF 2/2 PNA and COVID. NKDA    In ED:  VS afebrile, 92 HR, 163/86 BP, 18 RR SpO2 97% on RA  HGB 8.5, Na 127, Cr 14.50,    s/p 1mg ativan     # PATIENT MISSED HEMODIALYSIS  # COVID19 INFECTION + PNEUMONIA  # ESRD ON HD TTS - W/ HX OF NONCOMPLIANCE    - HYDRALAZINE, METOPROLOL AND NORVASC  - PLACED ON DECADRON AND LEVAQUIN RECENTLY  - MONITORING INFLAMMATORY MARKERS, MONITORING PO2  - SUPPLEMENTAL O2  - CONTACT AND DROPLET ISOLATION PRECAUTIONS  - NEPHROLOGY CONSULT    # ANEMIA OF CKD  # HX OF PANCYTOPENIA   - S/P PRBC TRANSFUSION [11/12]  - TREND HGB, TRANSFUSION THRESHOLD HGB < 7  - TYPE AND SCREEN  - ON EPO  - DENIES RECENT HEMATEMESIS, MELENA, HEMATOCHEZIA    # HISTORY OF ESOPHAGITIS AND DUODENITIS [1/2021], HX OF GASTROPARESIS W/ EVIDENCE OF THICKENED ESOPHAGUS ON PREVIOUS CT SCAN   - RECENT EGD AT [facility] - DEMONSTRATED RETAINED FOOD PRODUCT IN STOMACH, RECOMMENDED FOR GASTRIC EMPTYING STUDY  - DENIES RECENT HEMATEMESIS   - MONITORING HGB, PLACED ON PPI BID  - CARAFATE, PRN ANTIEMETICS  - MONITORING FOR SYMPTOMS    # SEVERE PROTEIN CALORIE MALNUTRITION, FAILURE TO THRIVE - NUTRITIONAL SUPPLEMENT    # HLD  # HTN  # DM   # PARTIALLY BLIND  # S/P LEFT BKA  # HX OF PVD  # LS SPINAL STENOSIS  # GI AND DVT PPX

## 2022-11-18 NOTE — H&P ADULT - ASSESSMENT
62M PMHx ESRD (T/Th/S, last dialyzed Thursday), Anemia, GIB, DM, HTN, HLD, BPH, CHF, PAD, s/p L BKA presents after incomplete dialysis session. Admitted to medicine for dialysis

## 2022-11-18 NOTE — H&P ADULT - NSHPPHYSICALEXAM_GEN_ALL_CORE
T(C): 36.7 (11-17-22 @ 18:30), Max: 36.7 (11-17-22 @ 18:30)  HR: 92 (11-17-22 @ 18:30) (92 - 92)  BP: 163/86 (11-17-22 @ 18:30) (163/86 - 163/86)  RR: 18 (11-17-22 @ 18:30) (18 - 18)  SpO2: 97% (11-17-22 @ 18:30) (97% - 97%)    GENERAL: patient appears well, pt mini,ally cooperative with exam  HEAD: normocephalic, atraumatic  EYES: sclera clear, no exudates  ENMT: oropharynx clear without erythema, no exudates, moist mucous membranes  NECK: supple, soft, no thyromegaly noted  LUNGS: clear to auscultation bilaterally (anteriorly), no wheezing, rhonchi or rales appreciated  HEART: S1/S2, regular rate and rhythm, no murmurs noted, +2 pitting RLE edema  GASTROINTESTINAL: abdomen is soft, nondistended, nontender, normoactive bowel sounds, no palpable masses  INTEGUMENT: good skin turgor, no lesions noted  MUSCULOSKELETAL: no clubbing or cyanosis, +RUE AVF with palpable thrill, +L BKA  NEUROLOGIC: AAO x3, CNII-XII intact, no obvious sensorimotor deficits noted

## 2022-11-19 VITALS
RESPIRATION RATE: 18 BRPM | DIASTOLIC BLOOD PRESSURE: 77 MMHG | HEART RATE: 94 BPM | TEMPERATURE: 98 F | OXYGEN SATURATION: 95 % | SYSTOLIC BLOOD PRESSURE: 148 MMHG

## 2022-11-19 LAB
ANION GAP SERPL CALC-SCNC: 11 MMOL/L — SIGNIFICANT CHANGE UP (ref 5–17)
BUN SERPL-MCNC: 61 MG/DL — HIGH (ref 7–18)
CALCIUM SERPL-MCNC: 8.1 MG/DL — LOW (ref 8.4–10.5)
CHLORIDE SERPL-SCNC: 94 MMOL/L — LOW (ref 96–108)
CO2 SERPL-SCNC: 24 MMOL/L — SIGNIFICANT CHANGE UP (ref 22–31)
CREAT SERPL-MCNC: 11.2 MG/DL — HIGH (ref 0.5–1.3)
EGFR: 5 ML/MIN/1.73M2 — LOW
GLUCOSE BLDC GLUCOMTR-MCNC: 221 MG/DL — HIGH (ref 70–99)
GLUCOSE BLDC GLUCOMTR-MCNC: 235 MG/DL — HIGH (ref 70–99)
GLUCOSE BLDC GLUCOMTR-MCNC: 296 MG/DL — HIGH (ref 70–99)
GLUCOSE BLDC GLUCOMTR-MCNC: 366 MG/DL — HIGH (ref 70–99)
GLUCOSE SERPL-MCNC: 354 MG/DL — HIGH (ref 70–99)
HCT VFR BLD CALC: 27.8 % — LOW (ref 39–50)
HGB BLD-MCNC: 8.9 G/DL — LOW (ref 13–17)
MAGNESIUM SERPL-MCNC: 2.6 MG/DL — SIGNIFICANT CHANGE UP (ref 1.6–2.6)
MCHC RBC-ENTMCNC: 30.2 PG — SIGNIFICANT CHANGE UP (ref 27–34)
MCHC RBC-ENTMCNC: 32 GM/DL — SIGNIFICANT CHANGE UP (ref 32–36)
MCV RBC AUTO: 94.2 FL — SIGNIFICANT CHANGE UP (ref 80–100)
MRSA PCR RESULT.: SIGNIFICANT CHANGE UP
NRBC # BLD: 0 /100 WBCS — SIGNIFICANT CHANGE UP (ref 0–0)
PHOSPHATE SERPL-MCNC: 4.1 MG/DL — SIGNIFICANT CHANGE UP (ref 2.5–4.5)
PLATELET # BLD AUTO: 82 K/UL — LOW (ref 150–400)
POTASSIUM SERPL-MCNC: 5.7 MMOL/L — HIGH (ref 3.5–5.3)
POTASSIUM SERPL-SCNC: 5.7 MMOL/L — HIGH (ref 3.5–5.3)
RBC # BLD: 2.95 M/UL — LOW (ref 4.2–5.8)
RBC # FLD: 19.6 % — HIGH (ref 10.3–14.5)
S AUREUS DNA NOSE QL NAA+PROBE: SIGNIFICANT CHANGE UP
SARS-COV-2 RNA SPEC QL NAA+PROBE: SIGNIFICANT CHANGE UP
SODIUM SERPL-SCNC: 129 MMOL/L — LOW (ref 135–145)
WBC # BLD: 5.69 K/UL — SIGNIFICANT CHANGE UP (ref 3.8–10.5)
WBC # FLD AUTO: 5.69 K/UL — SIGNIFICANT CHANGE UP (ref 3.8–10.5)

## 2022-11-19 RX ORDER — TRAZODONE HCL 50 MG
50 TABLET ORAL ONCE
Refills: 0 | Status: COMPLETED | OUTPATIENT
Start: 2022-11-19 | End: 2022-11-19

## 2022-11-19 RX ADMIN — Medication 650 MILLIGRAM(S): at 01:06

## 2022-11-19 RX ADMIN — Medication 50 MILLIGRAM(S): at 04:05

## 2022-11-19 RX ADMIN — Medication 3 MILLIGRAM(S): at 02:21

## 2022-11-19 RX ADMIN — PANTOPRAZOLE SODIUM 40 MILLIGRAM(S): 20 TABLET, DELAYED RELEASE ORAL at 07:39

## 2022-11-19 RX ADMIN — ALBUTEROL 2 PUFF(S): 90 AEROSOL, METERED ORAL at 21:40

## 2022-11-19 RX ADMIN — Medication 81 MILLIGRAM(S): at 12:19

## 2022-11-19 RX ADMIN — Medication 3: at 17:28

## 2022-11-19 RX ADMIN — Medication 1 MILLIGRAM(S): at 12:19

## 2022-11-19 RX ADMIN — BUDESONIDE AND FORMOTEROL FUMARATE DIHYDRATE 2 PUFF(S): 160; 4.5 AEROSOL RESPIRATORY (INHALATION) at 21:40

## 2022-11-19 RX ADMIN — INSULIN GLARGINE 4 UNIT(S): 100 INJECTION, SOLUTION SUBCUTANEOUS at 17:26

## 2022-11-19 RX ADMIN — Medication 1 GRAM(S): at 12:19

## 2022-11-19 RX ADMIN — Medication 325 MILLIGRAM(S): at 12:18

## 2022-11-19 RX ADMIN — Medication 25 MILLIGRAM(S): at 21:39

## 2022-11-19 RX ADMIN — ALBUTEROL 2 PUFF(S): 90 AEROSOL, METERED ORAL at 08:07

## 2022-11-19 RX ADMIN — Medication 0.25 MILLIGRAM(S): at 21:39

## 2022-11-19 RX ADMIN — Medication 2: at 08:07

## 2022-11-19 RX ADMIN — Medication 6 MILLIGRAM(S): at 05:07

## 2022-11-19 RX ADMIN — Medication 1 TABLET(S): at 17:26

## 2022-11-19 RX ADMIN — Medication 650 MILLIGRAM(S): at 02:05

## 2022-11-19 RX ADMIN — Medication 5: at 12:19

## 2022-11-19 RX ADMIN — Medication 1000 UNIT(S): at 12:32

## 2022-11-19 RX ADMIN — Medication 25 MILLIGRAM(S): at 05:07

## 2022-11-19 RX ADMIN — INSULIN GLARGINE 4 UNIT(S): 100 INJECTION, SOLUTION SUBCUTANEOUS at 08:14

## 2022-11-19 RX ADMIN — SIMVASTATIN 40 MILLIGRAM(S): 20 TABLET, FILM COATED ORAL at 21:40

## 2022-11-19 RX ADMIN — CHLORHEXIDINE GLUCONATE 1 APPLICATION(S): 213 SOLUTION TOPICAL at 06:36

## 2022-11-19 RX ADMIN — Medication 1 TABLET(S): at 05:07

## 2022-11-19 RX ADMIN — PREGABALIN 1000 MICROGRAM(S): 225 CAPSULE ORAL at 12:19

## 2022-11-19 RX ADMIN — Medication 650 MILLIGRAM(S): at 22:39

## 2022-11-19 RX ADMIN — Medication 25 MILLIGRAM(S): at 16:07

## 2022-11-19 RX ADMIN — Medication 650 MILLIGRAM(S): at 21:39

## 2022-11-19 RX ADMIN — AMLODIPINE BESYLATE 10 MILLIGRAM(S): 2.5 TABLET ORAL at 05:07

## 2022-11-19 RX ADMIN — Medication 1 TABLET(S): at 12:18

## 2022-11-20 ENCOUNTER — TRANSCRIPTION ENCOUNTER (OUTPATIENT)
Age: 62
End: 2022-11-20

## 2022-11-20 LAB — GLUCOSE BLDC GLUCOMTR-MCNC: 208 MG/DL — HIGH (ref 70–99)

## 2022-11-20 PROCEDURE — 83690 ASSAY OF LIPASE: CPT

## 2022-11-20 PROCEDURE — 87635 SARS-COV-2 COVID-19 AMP PRB: CPT

## 2022-11-20 PROCEDURE — 80048 BASIC METABOLIC PNL TOTAL CA: CPT

## 2022-11-20 PROCEDURE — 99285 EMERGENCY DEPT VISIT HI MDM: CPT

## 2022-11-20 PROCEDURE — 36415 COLL VENOUS BLD VENIPUNCTURE: CPT

## 2022-11-20 PROCEDURE — 82962 GLUCOSE BLOOD TEST: CPT

## 2022-11-20 PROCEDURE — 80053 COMPREHEN METABOLIC PANEL: CPT

## 2022-11-20 PROCEDURE — 87641 MR-STAPH DNA AMP PROBE: CPT

## 2022-11-20 PROCEDURE — 99284 EMERGENCY DEPT VISIT MOD MDM: CPT

## 2022-11-20 PROCEDURE — 87640 STAPH A DNA AMP PROBE: CPT

## 2022-11-20 PROCEDURE — 85025 COMPLETE CBC W/AUTO DIFF WBC: CPT

## 2022-11-20 PROCEDURE — 84100 ASSAY OF PHOSPHORUS: CPT

## 2022-11-20 PROCEDURE — 85027 COMPLETE CBC AUTOMATED: CPT

## 2022-11-20 PROCEDURE — 83735 ASSAY OF MAGNESIUM: CPT

## 2022-11-20 PROCEDURE — 99261: CPT

## 2022-11-20 PROCEDURE — 94640 AIRWAY INHALATION TREATMENT: CPT

## 2022-11-20 RX ORDER — LANOLIN ALCOHOL/MO/W.PET/CERES
2 CREAM (GRAM) TOPICAL ONCE
Refills: 0 | Status: COMPLETED | OUTPATIENT
Start: 2022-11-20 | End: 2022-11-20

## 2022-11-20 RX ADMIN — INSULIN GLARGINE 4 UNIT(S): 100 INJECTION, SOLUTION SUBCUTANEOUS at 08:41

## 2022-11-20 RX ADMIN — Medication 650 MILLIGRAM(S): at 08:37

## 2022-11-20 RX ADMIN — Medication 650 MILLIGRAM(S): at 08:07

## 2022-11-20 RX ADMIN — Medication 2: at 08:41

## 2022-11-20 RX ADMIN — Medication 1 GRAM(S): at 00:12

## 2022-11-20 RX ADMIN — Medication 3 MILLIGRAM(S): at 00:12

## 2022-11-20 NOTE — PROGRESS NOTE ADULT - PROBLEM SELECTOR PLAN 3
h/o DM on Levemir 4u BID   on admission  last A1c 5.2 in october 2022  c/w 4u lantus and SSI  FS [facility]
h/o DM on Levemir 4u BID   on admission  last A1c 5.2 in october 2022  c/w 4u lantus and SSI  FS [facility]S

## 2022-11-20 NOTE — PROGRESS NOTE ADULT - PROBLEM SELECTOR PLAN 7
c/w levaquin to complete abx course for PNA per prev admission
c/w levaquin to complete abx course for PNA per prev admission

## 2022-11-20 NOTE — DISCHARGE NOTE NURSING/CASE MANAGEMENT/SOCIAL WORK - NSDCPEFALRISK_GEN_ALL_CORE
For information on Fall & Injury Prevention, visit: [email or web address] OR  [email or web address] OR  https://www.cdc.gov/steadi/patient.html

## 2022-11-20 NOTE — PROGRESS NOTE ADULT - SUBJECTIVE AND OBJECTIVE BOX
PGY-1 Progress Note discussed with attending      PLEASE CONTACT ON CALL TEAM:  - On Call Team (Please refer to Dejon) FROM 5:00 PM - 8:30PM  - Nightfloat Team FROM 8:30 -7:30 AM    INTERVAL HPI/OVERNIGHT EVENTS:       REVIEW OF SYSTEMS:  CONSTITUTIONAL: No fever, weight loss, or fatigue  RESPIRATORY: No cough, wheezing, chills or hemoptysis; No shortness of breath  CARDIOVASCULAR: No chest pain, palpitations, dizziness, or leg swelling  GASTROINTESTINAL: No abdominal pain. No nausea, vomiting, or hematemesis; No diarrhea or constipation. No melena or hematochezia.  GENITOURINARY: No dysuria or hematuria, urinary frequency  NEUROLOGICAL: No headaches, memory loss, loss of strength, numbness, or tremors  SKIN: No itching, burning, rashes, or lesions     MEDICATIONS  (STANDING):  albuterol    90 MICROgram(s) HFA Inhaler 2 Puff(s) Inhalation every 6 hours  ALPRAZolam 0.25 milliGRAM(s) Oral at bedtime  amLODIPine   Tablet 10 milliGRAM(s) Oral daily  aspirin  chewable 81 milliGRAM(s) Oral daily  budesonide 160 MICROgram(s)/formoterol 4.5 MICROgram(s) Inhaler 2 Puff(s) Inhalation two times a day  calcium carbonate    500 mG (Tums) Chewable 1 Tablet(s) Chew two times a day  chlorhexidine 2% Cloths 1 Application(s) Topical <User Schedule>  cholecalciferol 1000 Unit(s) Oral daily  cyanocobalamin 1000 MICROGram(s) Oral daily  dexAMETHasone     Tablet 6 milliGRAM(s) Oral daily  epoetin beverley-epbx (RETACRIT) Injectable 72931 Unit(s) IV Push <User Schedule>  ferrous    sulfate 325 milliGRAM(s) Oral daily  folic acid 1 milliGRAM(s) Oral daily  heparin   Injectable 5000 Unit(s) SubCutaneous every 12 hours  hydrALAZINE 25 milliGRAM(s) Oral three times a day  insulin glargine Injectable (LANTUS) 4 Unit(s) SubCutaneous <User Schedule>  insulin lispro (ADMELOG) corrective regimen sliding scale   SubCutaneous three times a day before meals  insulin lispro (ADMELOG) corrective regimen sliding scale   SubCutaneous at bedtime  levothyroxine 25 MICROGram(s) Oral daily  metoprolol succinate ER 25 milliGRAM(s) Oral daily  Nephro-luann 1 Tablet(s) Oral daily  pantoprazole    Tablet 40 milliGRAM(s) Oral before breakfast  simvastatin 40 milliGRAM(s) Oral at bedtime  sodium zirconium cyclosilicate 10 Gram(s) Oral <User Schedule>  sucralfate suspension 1 Gram(s) Oral four times a day  tiotropium 18 MICROgram(s) Capsule 1 Capsule(s) Inhalation daily    MEDICATIONS  (PRN):  acetaminophen     Tablet .. 650 milliGRAM(s) Oral every 6 hours PRN Temp greater or equal to 38C (100.4F), Mild Pain (1 - 3)  melatonin 3 milliGRAM(s) Oral at bedtime PRN Insomnia  ondansetron Injectable 4 milliGRAM(s) IV Push every 8 hours PRN Nausea and/or Vomiting      Vital Signs Last 24 Hrs  T(C): 36.9 (19 Nov 2022 21:31), Max: 37 (19 Nov 2022 14:25)  T(F): 98.5 (19 Nov 2022 21:31), Max: 98.6 (19 Nov 2022 14:25)  HR: 94 (19 Nov 2022 21:31) (90 - 94)  BP: 148/77 (19 Nov 2022 21:31) (145/78 - 150/76)  BP(mean): --  RR: 18 (19 Nov 2022 21:31) (18 - 18)  SpO2: 95% (19 Nov 2022 21:31) (95% - 95%)    Parameters below as of 19 Nov 2022 21:31  Patient On (Oxygen Delivery Method): room air        PHYSICAL EXAMINATION:  GENERAL: NAD, well built  HEAD:  Atraumatic, Normocephalic  EYES:  conjunctiva and sclera clear  NECK: Supple, No JVD, Normal thyroid  CHEST/LUNG: Clear to auscultation. Clear to percussion bilaterally; No rales, rhonchi, wheezing, or rubs  HEART: Regular rate and rhythm; No murmurs, rubs, or gallops  ABDOMEN: Soft, Nontender, Nondistended; Bowel sounds present, no pain or masses on palpation  NERVOUS SYSTEM:  Alert & Oriented X3  : voiding well  EXTREMITIES:  2+ Peripheral Pulses, No clubbing, cyanosis, or edema  SKIN: warm dry                          8.9    5.69  )-----------( 82       ( 19 Nov 2022 10:54 )             27.8     11-19    129<L>  |  94<L>  |  61<H>  ----------------------------<  354<H>  5.7<H>   |  24  |  11.20<H>    Ca    8.1<L>      19 Nov 2022 10:54  Phos  4.1     11-19  Mg     2.6     11-19                I&O's Summary          CAPILLARY BLOOD GLUCOSE      RADIOLOGY & ADDITIONAL TESTS:                  
Patient is a 62y old  Male who presents with a chief complaint of Missed dialysis (2022 16:09)    PATIENT IS SEEN AND EXAMINED IN MEDICAL FLOOR.    SULTANA [    ]    JEREMY [   ]      GT [   ]    ALLERGIES:  fish (Rash)  liver (Anaphylaxis)  No Known Drug Allergies      Daily     Daily Weight in k.1 (2022 18:44)    VITALS:    Vital Signs Last 24 Hrs  T(C): 37 (2022 14:25), Max: 37 (2022 14:25)  T(F): 98.6 (2022 14:25), Max: 98.6 (2022 14:25)  HR: 92 (2022 14:25) (89 - 92)  BP: 150/76 (2022 14:25) (142/89 - 184/88)  BP(mean): --  RR: 18 (2022 14:25) (16 - 18)  SpO2: 95% (2022 14:25) (95% - 98%)    Parameters below as of 2022 14:25  Patient On (Oxygen Delivery Method): room air        LABS:    CBC Full  -  ( 2022 10:54 )  WBC Count : 5.69 K/uL  RBC Count : 2.95 M/uL  Hemoglobin : 8.9 g/dL  Hematocrit : 27.8 %  Platelet Count - Automated : 82 K/uL  Mean Cell Volume : 94.2 fl  Mean Cell Hemoglobin : 30.2 pg  Mean Cell Hemoglobin Concentration : 32.0 gm/dL  Auto Neutrophil # : x  Auto Lymphocyte # : x  Auto Monocyte # : x  Auto Eosinophil # : x  Auto Basophil # : x  Auto Neutrophil % : x  Auto Lymphocyte % : x  Auto Monocyte % : x  Auto Eosinophil % : x  Auto Basophil % : x      11-    129<L>  |  94<L>  |  61<H>  ----------------------------<  354<H>  5.7<H>   |  24  |  11.20<H>    Ca    8.1<L>      2022 10:54  Phos  4.1     -  Mg     2.6         TPro  6.2  /  Alb  3.0<L>  /  TBili  0.6  /  DBili  x   /  AST  27  /  ALT  27  /  AlkPhos  122<H>      CAPILLARY BLOOD GLUCOSE      POCT Blood Glucose.: 296 mg/dL (2022 17:02)  POCT Blood Glucose.: 366 mg/dL (2022 11:40)  POCT Blood Glucose.: 221 mg/dL (2022 07:55)  POCT Blood Glucose.: 229 mg/dL (2022 22:12)        LIVER FUNCTIONS - ( 2022 19:45 )  Alb: 3.0 g/dL / Pro: 6.2 g/dL / ALK PHOS: 122 U/L / ALT: 27 U/L DA / AST: 27 U/L / GGT: x           Creatinine Trend: 11.20<--, 14.80<--, 14.50<--, 12.00<--, 9.79<--, 12.90<--  I&O's Summary    2022 07:01  -  2022 07:00  --------------------------------------------------------  IN: 600 mL / OUT: 1589 mL / NET: -989 mL            .Blood Blood-Peripheral   @ 06:58   No Growth Final  --  --      .Blood Blood-Peripheral   @ 06:48   No Growth Final  --  --          MEDICATIONS:    MEDICATIONS  (STANDING):  albuterol    90 MICROgram(s) HFA Inhaler 2 Puff(s) Inhalation every 6 hours  ALPRAZolam 0.25 milliGRAM(s) Oral at bedtime  amLODIPine   Tablet 10 milliGRAM(s) Oral daily  aspirin  chewable 81 milliGRAM(s) Oral daily  budesonide 160 MICROgram(s)/formoterol 4.5 MICROgram(s) Inhaler 2 Puff(s) Inhalation two times a day  calcium carbonate    500 mG (Tums) Chewable 1 Tablet(s) Chew two times a day  chlorhexidine 2% Cloths 1 Application(s) Topical <User Schedule>  cholecalciferol 1000 Unit(s) Oral daily  cyanocobalamin 1000 MICROGram(s) Oral daily  dexAMETHasone     Tablet 6 milliGRAM(s) Oral daily  epoetin beverley-epbx (RETACRIT) Injectable 80785 Unit(s) IV Push <User Schedule>  ferrous    sulfate 325 milliGRAM(s) Oral daily  folic acid 1 milliGRAM(s) Oral daily  heparin   Injectable 5000 Unit(s) SubCutaneous every 12 hours  hydrALAZINE 25 milliGRAM(s) Oral three times a day  insulin glargine Injectable (LANTUS) 4 Unit(s) SubCutaneous <User Schedule>  insulin lispro (ADMELOG) corrective regimen sliding scale   SubCutaneous three times a day before meals  insulin lispro (ADMELOG) corrective regimen sliding scale   SubCutaneous at bedtime  levothyroxine 25 MICROGram(s) Oral daily  metoprolol succinate ER 25 milliGRAM(s) Oral daily  Nephro-luann 1 Tablet(s) Oral daily  pantoprazole    Tablet 40 milliGRAM(s) Oral before breakfast  simvastatin 40 milliGRAM(s) Oral at bedtime  sodium zirconium cyclosilicate 10 Gram(s) Oral <User Schedule>  sucralfate suspension 1 Gram(s) Oral four times a day  tiotropium 18 MICROgram(s) Capsule 1 Capsule(s) Inhalation daily      MEDICATIONS  (PRN):  acetaminophen     Tablet .. 650 milliGRAM(s) Oral every 6 hours PRN Temp greater or equal to 38C (100.4F), Mild Pain (1 - 3)  melatonin 3 milliGRAM(s) Oral at bedtime PRN Insomnia  ondansetron Injectable 4 milliGRAM(s) IV Push every 8 hours PRN Nausea and/or Vomiting        REVIEW OF SYSTEMS:                           ALL ROS DONE [ X   ]      CONSTITUTIONAL:  LETHARGIC [   ], FEVER [   ], UNRESPONSIVE [   ]  CVS:  CP  [   ], SOB, [   ], PALPITATIONS [   ], DIZZYNESS [   ]  RS: COUGH [   ], SPUTUM [   ]  GI: ABDOMINAL PAIN [   ], NAUSEA [   ], VOMITINGS [   ], DIARRHEA [   ], CONSTIPATION [   ]  :  DYSURIA [   ], NOCTURIA [   ], INCREASED FREQUENCY [   ], DRIBLING [   ],  SKELETAL: PAINFUL JOINTS [   ], SWOLLEN JOINTS [   ], NECK ACHE [   ], LOW BACK ACHE [   ],  SKIN : ULCERS [   ], RASH [   ], ITCHING [   ]  CNS: HEAD ACHE [   ], DOUBLE VISION [   ], BLURRED VISION [   ], AMS / CONFUSION [   ], SEIZURES [   ], WEAKNESS [   ],TINGLING / NUMBNESS [   ]      PHYSICAL EXAMINATION:    GENERAL APPEARANCE: NO DISTRESS  HEENT:  NO PALLOR, NO  JVD,  NO   NODES, NECK SUPPLE  CVS: S1 +, S2 +,   RS: AEEB,  OCCASIONAL  RALES +,   NO RONCHI  ABD: SOFT, NT, NO, BS +  EXT: PE +,  LEFT BKA +  SKIN: WARM,   SKELETAL:  ROM REDUCED AT CERVICAL & LS SPINE  CNS:  AAO X  3  ,   OLD DEFICITS         RADIOLOGY :    < from: Xray Chest 1 View-PORTABLE IMMEDIATE (Xray Chest 1 View-PORTABLE IMMEDIATE .) (22 @ 13:25) >  COMPARISON: 10/3/2022    FINDINGS: No change in the heart or mediastinal configuration. Hazy right   mid and lower lung field infiltrates with more patchy right basilar   components is now seen consistent with pneumonia. Left lung is clear. The   angles are sharp.    IMPRESSION: Right mid and lower lung field infiltrates.    < end of copied text >        ASSESSMENT :     Hyponatremia, hypo-osmolarity, or hypo-osmolar hyponatremia    No pertinent past medical history    Hypertension    Adrenal insufficiency    CKD (chronic kidney disease)    Anemia    Glaucoma    Coronary artery disease    HLD (hyperlipidemia)    Peripheral vascular disease    Spinal stenosis of lumbosacral region    Hyperparathyroidism    Diabetes mellitus    Diabetic neuropathy    Contracture of hand    Osteoarthritis    Osteoporosis    Vision loss of left eye    ESRD on hemodialysis    Cataract    BPH (benign prostatic hyperplasia)    UTI (urinary tract infection)    Bladder mass    H/O hematuria    Osteoporosis    Vision loss of right eye    Depression    Chronic GERD    Osteomyelitis of vertebra    CHF (congestive heart failure)    No significant past surgical history    Below knee amputation status, left    History of right cataract extraction    History of left cataract extraction    S/P arteriovenous (AV) fistula creation    H/O hematuria    H/O transurethral destruction of bladder lesion    History of excision of mass        PLAN:  HPI:  62 year old male, from [facility], ambulates independently +left BKA w/ prosthetic leg, with PMHx ESRD (T//S), Anemia with remote GIB, DM, HTN, HLD, BPH, CHF, and PAD presents after incomplete dialysis session. Patient states that he only had 11 minutes of dialysis because he couldn't tolerate the mask given to him. Reports last full dialysis was Tuesday. Patient denies chest pain, shortness of breath, weakness or dizziness. Was seen last night in the ED for chronic leg pain and with multiple admission to [facility] for various medical problems, last discharged last weekend for AHRF 2/2 PNA and COVID. NKDA    In ED:  VS afebrile, 92 HR, 163/86 BP, 18 RR SpO2 97% on RA  HGB 8.5, Na 127, Cr 14.50,    s/p 1mg ativan    (2022 01:29)      # DC PLAN BACK TO [facility] ON 22 -  AFTER COMPLETING SCHEDULED & ADDITIONAL HD CYCLES TO RELIEVE FLUID OVER LOAD       # PATIENT MISSED HEMODIALYSIS ( FLUID OVER LOAD )    # COVID19 INFECTION + PNEUMONIA    # ESRD ON HD TTS - W/ HX OF NONCOMPLIANCE    - HYDRALAZINE, METOPROLOL AND NORVASC  - PLACED ON DECADRON AND LEVAQUIN RECENTLY  - MONITORING INFLAMMATORY MARKERS, MONITORING PO2  - SUPPLEMENTAL O2  - CONTACT AND DROPLET ISOLATION PRECAUTIONS  - NEPHROLOGY CONSULT    # ANEMIA OF CKD  # HX OF PANCYTOPENIA   - S/P PRBC TRANSFUSION []  - TREND HGB, TRANSFUSION THRESHOLD HGB < 7  - TYPE AND SCREEN  - ON EPO  - DENIES RECENT HEMATEMESIS, MELENA, HEMATOCHEZIA    # HISTORY OF ESOPHAGITIS AND DUODENITIS [2021], HX OF GASTROPARESIS W/ EVIDENCE OF THICKENED ESOPHAGUS ON PREVIOUS CT SCAN   - RECENT EGD AT [facility] - DEMONSTRATED RETAINED FOOD PRODUCT IN STOMACH, RECOMMENDED FOR GASTRIC EMPTYING STUDY  - DENIES RECENT HEMATEMESIS   - MONITORING HGB, PLACED ON PPI BID  - CARAFATE, PRN ANTIEMETICS  - MONITORING FOR SYMPTOMS    # SEVERE PROTEIN CALORIE MALNUTRITION, FAILURE TO THRIVE - NUTRITIONAL SUPPLEMENT    # HLD  # HTN  # DM   # PARTIALLY BLIND. LEGALLY BLIND   # S/P LEFT BKA  # HX OF PVD  # LS SPINAL STENOSIS    # GI AND DVT PPX     DR. COLIN MAC        
[facility] : Progress Note :: [phone], (office [phone]),   Dr Mosher / Dr Washington / Dr Young / Dr Doll / Dr Varsha TURCIOS / Dr Tello / Dr Garcia / Dr Larry qiu  _____________________________________________________________________________________________    S/P HD earlier    fish (Rash)  liver (Anaphylaxis)  No Known Drug Allergies    Hospital Medications:   MEDICATIONS  (STANDING):  albuterol    90 MICROgram(s) HFA Inhaler 2 Puff(s) Inhalation every 6 hours  ALPRAZolam 0.25 milliGRAM(s) Oral at bedtime  amLODIPine   Tablet 10 milliGRAM(s) Oral daily  aspirin  chewable 81 milliGRAM(s) Oral daily  budesonide 160 MICROgram(s)/formoterol 4.5 MICROgram(s) Inhaler 2 Puff(s) Inhalation two times a day  calcium carbonate    500 mG (Tums) Chewable 1 Tablet(s) Chew two times a day  chlorhexidine 2% Cloths 1 Application(s) Topical <User Schedule>  cholecalciferol 1000 Unit(s) Oral daily  cyanocobalamin 1000 MICROGram(s) Oral daily  dexAMETHasone     Tablet 6 milliGRAM(s) Oral daily  epoetin beverley-epbx (RETACRIT) Injectable 12853 Unit(s) IV Push <User Schedule>  ferrous    sulfate 325 milliGRAM(s) Oral daily  folic acid 1 milliGRAM(s) Oral daily  heparin   Injectable 5000 Unit(s) SubCutaneous every 12 hours  hydrALAZINE 25 milliGRAM(s) Oral three times a day  insulin glargine Injectable (LANTUS) 4 Unit(s) SubCutaneous <User Schedule>  insulin lispro (ADMELOG) corrective regimen sliding scale   SubCutaneous three times a day before meals  insulin lispro (ADMELOG) corrective regimen sliding scale   SubCutaneous at bedtime  levothyroxine 25 MICROGram(s) Oral daily  metoprolol succinate ER 25 milliGRAM(s) Oral daily  Nephro-luann 1 Tablet(s) Oral daily  pantoprazole    Tablet 40 milliGRAM(s) Oral before breakfast  simvastatin 40 milliGRAM(s) Oral at bedtime  sodium zirconium cyclosilicate 10 Gram(s) Oral <User Schedule>  sucralfate suspension 1 Gram(s) Oral four times a day  tiotropium 18 MICROgram(s) Capsule 1 Capsule(s) Inhalation daily      VITALS:  T(F): 98.6 (11-19-22 @ 14:25), Max: 99.2 (11-18-22 @ 16:25)  HR: 92 (11-19-22 @ 14:25)  BP: 150/76 (11-19-22 @ 14:25)  RR: 18 (11-19-22 @ 14:25)  SpO2: 95% (11-19-22 @ 14:25)  Wt(kg): --    11-18 @ 07:01  -  11-19 @ 07:00  --------------------------------------------------------  IN: 600 mL / OUT: 1589 mL / NET: -989 mL        PHYSICAL EXAM:  Constitutional: NAD  HEENT: anicteric sclera, oropharynx clear.  Neck: No JVD  Respiratory: CTAB, no wheezes, rales or rhonchi  Cardiovascular: S1, S2, RRR  Gastrointestinal: BS+, soft, NT/ND  Extremities:   peripheral edema  Neurological: A/O x 3, no focal deficits  : No CVA tenderness. No [place].   Skin: No rashes  Vascular Access: AVF with thrill and bruit     LABS:  11-19    129<L>  |  94<L>  |  61<H>  ----------------------------<  354<H>  5.7<H>   |  24  |  11.20<H>    Ca    8.1<L>      19 Nov 2022 10:54  Phos  4.1     11-19  Mg     2.6     11-19    TPro  6.2  /  Alb  3.0<L>  /  TBili  0.6  /  DBili      /  AST  27  /  ALT  27  /  AlkPhos  122<H>  11-17    Creatinine Trend: 11.20 <--, 14.80 <--, 14.50 <--, 12.00 <--, 9.79 <--                        8.9    5.69  )-----------( 82       ( 19 Nov 2022 10:54 )             27.8     Urine Studies:      RADIOLOGY & ADDITIONAL STUDIES:  
PGY-3 Progress Note discussed with attending    PAGER #: [[phone]] | or Message on [place] Teams TILL 5:00 PM  PLEASE CONTACT ON CALL TEAM:  - On Call Team (Please refer to Dejon) FROM 5:00 PM - 8:30PM  - Nightfloat Team FROM 8:30 -7:30 AM    INTERVAL HPI/OVERNIGHT EVENTS:   Pt refused HD this AM.    REVIEW OF SYSTEMS:  CONSTITUTIONAL: No fever, weight loss, or fatigue  RESPIRATORY: No cough, wheezing, chills or hemoptysis; No shortness of breath  CARDIOVASCULAR: No chest pain, palpitations, dizziness, or leg swelling  GASTROINTESTINAL: No abdominal pain. No nausea, vomiting, or hematemesis; No diarrhea or constipation. No melena or hematochezia.  GENITOURINARY: No dysuria or hematuria, urinary frequency  NEUROLOGICAL: No headaches, memory loss, loss of strength, numbness, or tremors  SKIN: No itching, burning, rashes, or lesions     Vital Signs Last 24 Hrs  T(C): 36.9 (19 Nov 2022 05:18), Max: 37.3 (18 Nov 2022 16:25)  T(F): 98.5 (19 Nov 2022 05:18), Max: 99.2 (18 Nov 2022 16:25)  HR: 90 (19 Nov 2022 09:08) (82 - 91)  BP: 145/78 (19 Nov 2022 09:08) (132/60 - 184/88)  BP(mean): --  RR: 17 (19 Nov 2022 05:18) (16 - 17)  SpO2: 96% (19 Nov 2022 05:18) (95% - 99%)    Parameters below as of 19 Nov 2022 05:18  Patient On (Oxygen Delivery Method): room air        PHYSICAL EXAMINATION:  GENERAL: patient appears wellHEAD: normocephalic, atraumatic  EYES: sclera clear, no exudates  ENMT: oropharynx clear without erythema, no exudates, moist mucous membranes  NECK: supple, soft, no thyromegaly noted  LUNGS: clear to auscultation bilaterally (anteriorly), no wheezing, rhonchi or rales appreciated  HEART: S1/S2, regular rate and rhythm, no murmurs noted, +2 pitting RLE edema  GASTROINTESTINAL: abdomen is soft, nondistended, nontender, normoactive bowel sounds, no palpable masses  INTEGUMENT: good skin turgor, no lesions noted  MUSCULOSKELETAL: no clubbing or cyanosis, +RUE AVF with palpable thrill, +L BKA  NEUROLOGIC: AAO x3, CNII-XII intact, no obvious sensorimotor deficits noted                          8.0    3.91  )-----------( 117      ( 18 Nov 2022 05:20 )             24.7     11-18    131<L>  |  93<L>  |  76<H>  ----------------------------<  295<H>  4.9   |  24  |  14.80<H>    Ca    7.7<L>      18 Nov 2022 05:20  Phos  5.2     11-18  Mg     3.0     11-18    TPro  6.2  /  Alb  3.0<L>  /  TBili  0.6  /  DBili  x   /  AST  27  /  ALT  27  /  AlkPhos  122<H>  11-17    LIVER FUNCTIONS - ( 17 Nov 2022 19:45 )  Alb: 3.0 g/dL / Pro: 6.2 g/dL / ALK PHOS: 122 U/L / ALT: 27 U/L DA / AST: 27 U/L / GGT: x                   CAPILLARY BLOOD GLUCOSE      RADIOLOGY & ADDITIONAL TESTS:

## 2022-11-20 NOTE — PROGRESS NOTE ADULT - PROBLEM SELECTOR PLAN 8
c/w decadron to finish course for COVID per prev admission
c/w decadron to finish course for COVID per prev admission

## 2022-11-20 NOTE — PROGRESS NOTE ADULT - ASSESSMENT
Patient is a 62y Male whom presented to the hospital with SOB. Admitted in this hospital earlier this week with COVID 19.  He did not complete HD treatment on Thursday. Presented to ED with SOB and bilateral leg edema.  Renal consulted for HD     # ESRD. s/p HD yesterday  scheduled for HD in AM- however, he is refusing. encourage to go to HD in AM   # anemia of CKD. epogen on HD   # CKD MBD. resume sevelamer 800mg three times per day ac    d/c planning 
62M PMHx ESRD (T/Th/S, last dialyzed Thursday), Anemia, GIB, DM, HTN, HLD, BPH, CHF, PAD, s/p L BKA presents after incomplete dialysis session. Admitted to medicine for dialysis
62M PMHx ESRD (T/Th/S, last dialyzed Thursday), Anemia, GIB, DM, HTN, HLD, BPH, CHF, PAD, s/p L BKA presents after incomplete dialysis session. Admitted to medicine for dialysis

## 2022-11-20 NOTE — DISCHARGE NOTE NURSING/CASE MANAGEMENT/SOCIAL WORK - PATIENT PORTAL LINK FT
You can access the FollowMyHealth Patient Portal offered by [facility] by registering at the following website: [email or web address]. By joining [employer]’s FollowMyHealth portal, you will also be able to view your health information using other applications (apps) compatible with our system.

## 2022-11-20 NOTE — PROGRESS NOTE ADULT - PROBLEM SELECTOR PLAN 1
s/p incomplete dialysis session  Cr 14.50 on adm  pt appears euvolemic on exam, not in respiratory distress, electrolytes acceptable.  no indication for emergency dialysis at time of admission   c/w HD per nephro  Nephro Vidhi consulted  [place] sunday 11/20
s/p incomplete dialysis session  Cr 14.50 on adm  pt appears euvolemic on exam, not in respiratory distress, electrolytes acceptable.  no indication for emergency dialysis at time of admission   c/w HD per nephro  Nephro Vidhi consulted  [place] sunday 11/20

## 2022-11-20 NOTE — PROGRESS NOTE ADULT - PROBLEM SELECTOR PROBLEM 4
Anemia of chronic renal failure, unspecified CKD stage
Anemia of chronic renal failure, unspecified CKD stage

## 2022-11-22 ENCOUNTER — EMERGENCY (EMERGENCY)
Facility: HOSPITAL | Age: 62
LOS: 1 days | Discharge: HOME | End: 2022-11-22
Attending: EMERGENCY MEDICINE
Payer: MEDICAID

## 2022-11-22 VITALS
HEIGHT: 66 IN | WEIGHT: 152.12 LBS | DIASTOLIC BLOOD PRESSURE: 90 MMHG | HEART RATE: 100 BPM | SYSTOLIC BLOOD PRESSURE: 166 MMHG | OXYGEN SATURATION: 97 % | TEMPERATURE: 98 F | RESPIRATION RATE: 18 BRPM

## 2022-11-22 DIAGNOSIS — Z98.41 CATARACT EXTRACTION STATUS, RIGHT EYE: Chronic | ICD-10-CM

## 2022-11-22 DIAGNOSIS — Z98.890 OTHER SPECIFIED POSTPROCEDURAL STATES: Chronic | ICD-10-CM

## 2022-11-22 DIAGNOSIS — Z87.448 PERSONAL HISTORY OF OTHER DISEASES OF URINARY SYSTEM: Chronic | ICD-10-CM

## 2022-11-22 DIAGNOSIS — Z89.512 ACQUIRED ABSENCE OF LEFT LEG BELOW KNEE: Chronic | ICD-10-CM

## 2022-11-22 DIAGNOSIS — Z98.42 CATARACT EXTRACTION STATUS, LEFT EYE: Chronic | ICD-10-CM

## 2022-11-22 PROCEDURE — 99284 EMERGENCY DEPT VISIT MOD MDM: CPT

## 2022-11-22 NOTE — ED ADULT TRIAGE NOTE - CHIEF COMPLAINT QUOTE
Patient states " My balls are the size of the baseball, it's swelling and it hurts since yesterday "  Denies fall

## 2022-11-23 VITALS
TEMPERATURE: 98 F | RESPIRATION RATE: 16 BRPM | HEART RATE: 67 BPM | SYSTOLIC BLOOD PRESSURE: 146 MMHG | OXYGEN SATURATION: 97 % | DIASTOLIC BLOOD PRESSURE: 79 MMHG

## 2022-11-23 LAB
ACETONE SERPL-MCNC: NEGATIVE — SIGNIFICANT CHANGE UP
ANION GAP SERPL CALC-SCNC: 17 MMOL/L — SIGNIFICANT CHANGE UP (ref 5–17)
BASOPHILS # BLD AUTO: 0 K/UL — SIGNIFICANT CHANGE UP (ref 0–0.2)
BASOPHILS NFR BLD AUTO: 0 % — SIGNIFICANT CHANGE UP (ref 0–2)
BUN SERPL-MCNC: 99 MG/DL — HIGH (ref 7–18)
CALCIUM SERPL-MCNC: 7.4 MG/DL — LOW (ref 8.4–10.5)
CHLORIDE SERPL-SCNC: 91 MMOL/L — LOW (ref 96–108)
CO2 SERPL-SCNC: 19 MMOL/L — LOW (ref 22–31)
CREAT SERPL-MCNC: 14.1 MG/DL — HIGH (ref 0.5–1.3)
EGFR: 4 ML/MIN/1.73M2 — LOW
EOSINOPHIL # BLD AUTO: 0 K/UL — SIGNIFICANT CHANGE UP (ref 0–0.5)
EOSINOPHIL NFR BLD AUTO: 0 % — SIGNIFICANT CHANGE UP (ref 0–6)
GLUCOSE SERPL-MCNC: 451 MG/DL — CRITICAL HIGH (ref 70–99)
HCT VFR BLD CALC: 25.4 % — LOW (ref 39–50)
HGB BLD-MCNC: 8.4 G/DL — LOW (ref 13–17)
LYMPHOCYTES # BLD AUTO: 0.49 K/UL — LOW (ref 1–3.3)
LYMPHOCYTES # BLD AUTO: 7 % — LOW (ref 13–44)
MCHC RBC-ENTMCNC: 31 PG — SIGNIFICANT CHANGE UP (ref 27–34)
MCHC RBC-ENTMCNC: 33.1 GM/DL — SIGNIFICANT CHANGE UP (ref 32–36)
MCV RBC AUTO: 93.7 FL — SIGNIFICANT CHANGE UP (ref 80–100)
MONOCYTES # BLD AUTO: 0.56 K/UL — SIGNIFICANT CHANGE UP (ref 0–0.9)
MONOCYTES NFR BLD AUTO: 8 % — SIGNIFICANT CHANGE UP (ref 2–14)
NEUTROPHILS # BLD AUTO: 5.91 K/UL — SIGNIFICANT CHANGE UP (ref 1.8–7.4)
NEUTROPHILS NFR BLD AUTO: 85 % — HIGH (ref 43–77)
NT-PROBNP SERPL-SCNC: HIGH PG/ML (ref 0–125)
PLATELET # BLD AUTO: 143 K/UL — LOW (ref 150–400)
POTASSIUM SERPL-MCNC: 5.2 MMOL/L — SIGNIFICANT CHANGE UP (ref 3.5–5.3)
POTASSIUM SERPL-SCNC: 5.2 MMOL/L — SIGNIFICANT CHANGE UP (ref 3.5–5.3)
RBC # BLD: 2.71 M/UL — LOW (ref 4.2–5.8)
RBC # FLD: 19.9 % — HIGH (ref 10.3–14.5)
SODIUM SERPL-SCNC: 127 MMOL/L — LOW (ref 135–145)
WBC # BLD: 6.95 K/UL — SIGNIFICANT CHANGE UP (ref 3.8–10.5)
WBC # FLD AUTO: 6.95 K/UL — SIGNIFICANT CHANGE UP (ref 3.8–10.5)

## 2022-11-23 PROCEDURE — 80048 BASIC METABOLIC PNL TOTAL CA: CPT

## 2022-11-23 PROCEDURE — 82962 GLUCOSE BLOOD TEST: CPT

## 2022-11-23 PROCEDURE — 71045 X-RAY EXAM CHEST 1 VIEW: CPT

## 2022-11-23 PROCEDURE — 82009 KETONE BODYS QUAL: CPT

## 2022-11-23 PROCEDURE — 36415 COLL VENOUS BLD VENIPUNCTURE: CPT

## 2022-11-23 PROCEDURE — 71045 X-RAY EXAM CHEST 1 VIEW: CPT | Mod: 26

## 2022-11-23 PROCEDURE — 85025 COMPLETE CBC W/AUTO DIFF WBC: CPT

## 2022-11-23 PROCEDURE — 99284 EMERGENCY DEPT VISIT MOD MDM: CPT

## 2022-11-23 PROCEDURE — 83880 ASSAY OF NATRIURETIC PEPTIDE: CPT

## 2022-11-23 RX ORDER — INSULIN HUMAN 100 [IU]/ML
8 INJECTION, SOLUTION SUBCUTANEOUS ONCE
Refills: 0 | Status: COMPLETED | OUTPATIENT
Start: 2022-11-23 | End: 2022-11-23

## 2022-11-23 RX ADMIN — INSULIN HUMAN 8 UNIT(S): 100 INJECTION, SOLUTION SUBCUTANEOUS at 04:02

## 2022-11-23 NOTE — ED PROVIDER NOTE - NSFOLLOWUPINSTRUCTIONS_ED_ALL_ED_FT
Return to ER immediately if feeling short of breath, having any pain, testicular swelling, fever, coughing worsens, vomiting, weakness, any concerns. Take medications as instructed if they were prescribed.  See your primary doctor as soon as possible (1-2 days). If you need assistance with follow up appointment, you can contact our Care Coordinator at [phone].

## 2022-11-23 NOTE — ED PROVIDER NOTE - PATIENT PORTAL LINK FT
You can access the FollowMyHealth Patient Portal offered by [facility] by registering at the following website: [email or web address]. By joining [employer]’s FollowMyHealth portal, you will also be able to view your health information using other applications (apps) compatible with our system.

## 2022-11-23 NOTE — ED ADULT NURSE NOTE - CINV DISCH TEACH PARTICIP
Called pt, informed her that her ins stated she needs to do PT at an [facility] location/  
Initiated [facility]ILIAN referral and added [facility] location [address].    It's pending review.   
Patient called and states that [facility] is out of network with her insurance. Patient doesn't want to go to [facility] because she states she's been there and didn't see any improvement. Did ask patient if she prefers to go to other [facility] facilities .ie: [facility]-states it's to far.     Can we initiate a referral for the patient to go to [facility]?    Patient does have BCBS HMO [employer] [facility]- needs an LILIAN referral.   
Per LILIAN, patient must stay within our network, can try a different location.     Can you call the patient and notify her of this ?    
Patient

## 2022-11-23 NOTE — ED PROVIDER NOTE - CLINICAL SUMMARY MEDICAL DECISION MAKING FREE TEXT BOX
3a- Pt is uncooperative in ED, constantly yelling for nurses attention for food, blanket and now adamantly requesting transfer back to [facility].  [facility] states pt was uncooperative and aggressive during attempted sono and returned pt to ED.  Patient's testicular swelling is consistent with right-sided heart failure and less likely infectious in nature given lack of erythema no tenderness to palpation and patient is afebrile with normal white blood cell count.  I spoke to Dr. Oquendo who agrees patient can be returned to [facility] and he will reassess patient at the assisted living facility.  Pt is well appearing, has no new complaints and able to walk with normal gait. Pt is stable for discharge and follow up with medical doctor. Pt educated on care and need for follow up. Discussed anticipatory guidance and return precautions. Questions answered. I had a detailed discussion with the patient and/or guardian regarding the historical points, exam findings, and any diagnostic results supporting the discharge diagnosis.

## 2022-11-23 NOTE — ED PROVIDER NOTE - CARE PROVIDER_API CALL
Dario De La Torre)  Medicine  [address], [address] [place] [address]  Phone: [phone]  Fax: [phone]  Follow Up Time:

## 2022-11-23 NOTE — ED PROVIDER NOTE - OBJECTIVE STATEMENT
62-year-old male history of end-stage renal disease on hemodialysis.  Patient states completed full course of hemodialysis yesterday (Monday).  Patient transferred from [facility] living for enlarged testicles.  Patient states he is legally blind (bilateral cataract noted) and states he palpated his testicles this afternoon and noted it to be swollen.  Patient denies fever, denies chest pain, denies shortness of breath.

## 2022-12-06 NOTE — PATIENT PROFILE ADULT. - SOURCE OF INFORMATION, PROFILE
Quality 110: Preventive Care And Screening: Influenza Immunization: Influenza Immunization previously received during influenza season
Quality 226: Preventive Care And Screening: Tobacco Use: Screening And Cessation Intervention: Patient screened for tobacco use and is an ex/non-smoker
Detail Level: Zone
patient

## 2023-01-01 ENCOUNTER — TRANSCRIPTION ENCOUNTER (OUTPATIENT)
Age: 63
End: 2023-01-01

## 2023-01-01 ENCOUNTER — INPATIENT (INPATIENT)
Facility: HOSPITAL | Age: 63
LOS: 2 days | Discharge: NURSING, REHAB OR LONG-TERM CARE | DRG: 637 | End: 2023-01-12
Attending: INTERNAL MEDICINE | Admitting: INTERNAL MEDICINE
Payer: MEDICAID

## 2023-01-01 ENCOUNTER — APPOINTMENT (OUTPATIENT)
Dept: VASCULAR SURGERY | Facility: CLINIC | Age: 63
End: 2023-01-01
Payer: MEDICAID

## 2023-01-01 ENCOUNTER — APPOINTMENT (OUTPATIENT)
Dept: ENDOVASCULAR SURGERY | Facility: CLINIC | Age: 63
End: 2023-01-01
Payer: MEDICAID

## 2023-01-01 ENCOUNTER — INPATIENT (INPATIENT)
Facility: HOSPITAL | Age: 63
LOS: 3 days | Discharge: NURSING, REHAB OR LONG-TERM CARE | DRG: 444 | End: 2023-02-24
Attending: INTERNAL MEDICINE | Admitting: INTERNAL MEDICINE
Payer: MEDICAID

## 2023-01-01 ENCOUNTER — RESULT REVIEW (OUTPATIENT)
Age: 63
End: 2023-01-01

## 2023-01-01 ENCOUNTER — APPOINTMENT (OUTPATIENT)
Dept: GASTROENTEROLOGY | Facility: CLINIC | Age: 63
End: 2023-01-01

## 2023-01-01 ENCOUNTER — INPATIENT (INPATIENT)
Facility: HOSPITAL | Age: 63
LOS: 5 days | Discharge: NURSING, REHAB OR LONG-TERM CARE | DRG: 377 | End: 2023-12-06
Attending: INTERNAL MEDICINE | Admitting: INTERNAL MEDICINE
Payer: MEDICAID

## 2023-01-01 ENCOUNTER — INPATIENT (INPATIENT)
Facility: HOSPITAL | Age: 63
LOS: 0 days | DRG: 208 | End: 2023-12-28
Attending: INTERNAL MEDICINE | Admitting: INTERNAL MEDICINE
Payer: MEDICAID

## 2023-01-01 ENCOUNTER — EMERGENCY (EMERGENCY)
Facility: HOSPITAL | Age: 63
LOS: 1 days | Discharge: HOME | End: 2023-01-01
Attending: EMERGENCY MEDICINE
Payer: MEDICAID

## 2023-01-01 ENCOUNTER — EMERGENCY (EMERGENCY)
Facility: HOSPITAL | Age: 63
LOS: 1 days | Discharge: HOME | End: 2023-01-01
Attending: STUDENT IN AN ORGANIZED HEALTH CARE EDUCATION/TRAINING PROGRAM
Payer: MEDICAID

## 2023-01-01 ENCOUNTER — INPATIENT (INPATIENT)
Facility: HOSPITAL | Age: 63
LOS: 1 days | Discharge: NURSING, REHAB OR LONG-TERM CARE | DRG: 291 | End: 2023-07-11
Attending: INTERNAL MEDICINE | Admitting: INTERNAL MEDICINE
Payer: MEDICAID

## 2023-01-01 ENCOUNTER — INPATIENT (INPATIENT)
Facility: HOSPITAL | Age: 63
LOS: 1 days | Discharge: NURSING, REHAB OR LONG-TERM CARE | DRG: 291 | End: 2023-07-03
Attending: INTERNAL MEDICINE | Admitting: INTERNAL MEDICINE
Payer: MEDICAID

## 2023-01-01 ENCOUNTER — INPATIENT (INPATIENT)
Facility: HOSPITAL | Age: 63
LOS: 7 days | Discharge: NURSING, REHAB OR LONG-TERM CARE | DRG: 189 | End: 2023-03-23
Attending: INTERNAL MEDICINE | Admitting: INTERNAL MEDICINE
Payer: MEDICAID

## 2023-01-01 ENCOUNTER — INPATIENT (INPATIENT)
Facility: HOSPITAL | Age: 63
LOS: 3 days | Discharge: NURSING, REHAB OR LONG-TERM CARE | DRG: 377 | End: 2023-05-31
Attending: INTERNAL MEDICINE | Admitting: INTERNAL MEDICINE
Payer: MEDICAID

## 2023-01-01 ENCOUNTER — INPATIENT (INPATIENT)
Facility: HOSPITAL | Age: 63
LOS: 34 days | Discharge: NURSING, REHAB OR LONG-TERM CARE | DRG: 291 | End: 2023-08-30
Attending: INTERNAL MEDICINE | Admitting: INTERNAL MEDICINE
Payer: MEDICAID

## 2023-01-01 VITALS
SYSTOLIC BLOOD PRESSURE: 133 MMHG | DIASTOLIC BLOOD PRESSURE: 75 MMHG | HEART RATE: 109 BPM | OXYGEN SATURATION: 96 % | TEMPERATURE: 98 F | RESPIRATION RATE: 18 BRPM

## 2023-01-01 VITALS
TEMPERATURE: 99 F | HEART RATE: 88 BPM | OXYGEN SATURATION: 96 % | DIASTOLIC BLOOD PRESSURE: 89 MMHG | SYSTOLIC BLOOD PRESSURE: 165 MMHG | RESPIRATION RATE: 18 BRPM

## 2023-01-01 VITALS
HEART RATE: 76 BPM | TEMPERATURE: 98 F | SYSTOLIC BLOOD PRESSURE: 151 MMHG | DIASTOLIC BLOOD PRESSURE: 87 MMHG | OXYGEN SATURATION: 83 % | WEIGHT: 158.95 LBS | RESPIRATION RATE: 18 BRPM | HEIGHT: 62 IN

## 2023-01-01 VITALS
HEART RATE: 125 BPM | DIASTOLIC BLOOD PRESSURE: 80 MMHG | WEIGHT: 134.92 LBS | RESPIRATION RATE: 20 BRPM | OXYGEN SATURATION: 95 % | SYSTOLIC BLOOD PRESSURE: 179 MMHG

## 2023-01-01 VITALS
SYSTOLIC BLOOD PRESSURE: 142 MMHG | DIASTOLIC BLOOD PRESSURE: 80 MMHG | TEMPERATURE: 99 F | OXYGEN SATURATION: 98 % | HEART RATE: 81 BPM | RESPIRATION RATE: 20 BRPM

## 2023-01-01 VITALS
OXYGEN SATURATION: 86 % | WEIGHT: 119.93 LBS | RESPIRATION RATE: 24 BRPM | HEART RATE: 76 BPM | SYSTOLIC BLOOD PRESSURE: 178 MMHG | DIASTOLIC BLOOD PRESSURE: 88 MMHG | TEMPERATURE: 99 F | HEIGHT: 66 IN

## 2023-01-01 VITALS
HEART RATE: 84 BPM | RESPIRATION RATE: 18 BRPM | WEIGHT: 146.61 LBS | OXYGEN SATURATION: 94 % | TEMPERATURE: 98 F | SYSTOLIC BLOOD PRESSURE: 173 MMHG | DIASTOLIC BLOOD PRESSURE: 88 MMHG

## 2023-01-01 VITALS
BODY MASS INDEX: 20.91 KG/M2 | OXYGEN SATURATION: 96 % | TEMPERATURE: 98.1 F | HEIGHT: 63 IN | WEIGHT: 118 LBS | RESPIRATION RATE: 18 BRPM | HEART RATE: 77 BPM | SYSTOLIC BLOOD PRESSURE: 154 MMHG | DIASTOLIC BLOOD PRESSURE: 82 MMHG

## 2023-01-01 VITALS
DIASTOLIC BLOOD PRESSURE: 94 MMHG | HEART RATE: 99 BPM | RESPIRATION RATE: 18 BRPM | OXYGEN SATURATION: 99 % | WEIGHT: 117.07 LBS | SYSTOLIC BLOOD PRESSURE: 184 MMHG | TEMPERATURE: 98 F | HEIGHT: 66 IN

## 2023-01-01 VITALS
OXYGEN SATURATION: 100 % | TEMPERATURE: 98 F | RESPIRATION RATE: 18 BRPM | DIASTOLIC BLOOD PRESSURE: 59 MMHG | HEART RATE: 95 BPM | SYSTOLIC BLOOD PRESSURE: 103 MMHG

## 2023-01-01 VITALS
SYSTOLIC BLOOD PRESSURE: 169 MMHG | OXYGEN SATURATION: 82 % | RESPIRATION RATE: 19 BRPM | HEIGHT: 62 IN | DIASTOLIC BLOOD PRESSURE: 82 MMHG | TEMPERATURE: 99 F | HEART RATE: 96 BPM

## 2023-01-01 VITALS
OXYGEN SATURATION: 98 % | RESPIRATION RATE: 17 BRPM | DIASTOLIC BLOOD PRESSURE: 84 MMHG | SYSTOLIC BLOOD PRESSURE: 167 MMHG | HEART RATE: 88 BPM | TEMPERATURE: 99 F

## 2023-01-01 VITALS
DIASTOLIC BLOOD PRESSURE: 84 MMHG | SYSTOLIC BLOOD PRESSURE: 170 MMHG | TEMPERATURE: 98 F | HEART RATE: 84 BPM | OXYGEN SATURATION: 99 % | RESPIRATION RATE: 20 BRPM | HEIGHT: 66 IN

## 2023-01-01 VITALS
DIASTOLIC BLOOD PRESSURE: 85 MMHG | HEART RATE: 101 BPM | HEIGHT: 66 IN | SYSTOLIC BLOOD PRESSURE: 180 MMHG | TEMPERATURE: 99 F | OXYGEN SATURATION: 100 % | RESPIRATION RATE: 19 BRPM

## 2023-01-01 VITALS
SYSTOLIC BLOOD PRESSURE: 150 MMHG | DIASTOLIC BLOOD PRESSURE: 70 MMHG | RESPIRATION RATE: 17 BRPM | HEART RATE: 99 BPM | TEMPERATURE: 99 F | OXYGEN SATURATION: 92 %

## 2023-01-01 VITALS
TEMPERATURE: 99 F | HEIGHT: 62 IN | OXYGEN SATURATION: 96 % | DIASTOLIC BLOOD PRESSURE: 69 MMHG | HEART RATE: 77 BPM | RESPIRATION RATE: 18 BRPM | SYSTOLIC BLOOD PRESSURE: 129 MMHG

## 2023-01-01 VITALS
SYSTOLIC BLOOD PRESSURE: 159 MMHG | OXYGEN SATURATION: 100 % | TEMPERATURE: 99 F | DIASTOLIC BLOOD PRESSURE: 88 MMHG | RESPIRATION RATE: 18 BRPM | HEART RATE: 97 BPM

## 2023-01-01 VITALS
TEMPERATURE: 99 F | DIASTOLIC BLOOD PRESSURE: 78 MMHG | SYSTOLIC BLOOD PRESSURE: 158 MMHG | HEIGHT: 62 IN | HEART RATE: 90 BPM | RESPIRATION RATE: 18 BRPM | OXYGEN SATURATION: 100 %

## 2023-01-01 VITALS
TEMPERATURE: 98 F | RESPIRATION RATE: 19 BRPM | OXYGEN SATURATION: 97 % | SYSTOLIC BLOOD PRESSURE: 163 MMHG | DIASTOLIC BLOOD PRESSURE: 89 MMHG | HEART RATE: 93 BPM

## 2023-01-01 VITALS
DIASTOLIC BLOOD PRESSURE: 81 MMHG | SYSTOLIC BLOOD PRESSURE: 154 MMHG | RESPIRATION RATE: 18 BRPM | OXYGEN SATURATION: 99 % | HEART RATE: 97 BPM | TEMPERATURE: 98 F

## 2023-01-01 VITALS
DIASTOLIC BLOOD PRESSURE: 91 MMHG | OXYGEN SATURATION: 98 % | HEIGHT: 62 IN | TEMPERATURE: 98 F | RESPIRATION RATE: 20 BRPM | HEART RATE: 66 BPM | SYSTOLIC BLOOD PRESSURE: 146 MMHG

## 2023-01-01 VITALS — DIASTOLIC BLOOD PRESSURE: 86 MMHG | SYSTOLIC BLOOD PRESSURE: 153 MMHG | HEART RATE: 83 BPM

## 2023-01-01 VITALS
TEMPERATURE: 97 F | DIASTOLIC BLOOD PRESSURE: 69 MMHG | RESPIRATION RATE: 18 BRPM | SYSTOLIC BLOOD PRESSURE: 121 MMHG | HEART RATE: 100 BPM | OXYGEN SATURATION: 94 %

## 2023-01-01 VITALS
RESPIRATION RATE: 20 BRPM | HEART RATE: 90 BPM | TEMPERATURE: 99 F | DIASTOLIC BLOOD PRESSURE: 100 MMHG | SYSTOLIC BLOOD PRESSURE: 202 MMHG | OXYGEN SATURATION: 93 %

## 2023-01-01 VITALS
WEIGHT: 118 LBS | DIASTOLIC BLOOD PRESSURE: 77 MMHG | BODY MASS INDEX: 20.91 KG/M2 | HEIGHT: 63 IN | SYSTOLIC BLOOD PRESSURE: 163 MMHG | HEART RATE: 78 BPM

## 2023-01-01 DIAGNOSIS — Z98.42 CATARACT EXTRACTION STATUS, LEFT EYE: Chronic | ICD-10-CM

## 2023-01-01 DIAGNOSIS — I10 ESSENTIAL (PRIMARY) HYPERTENSION: ICD-10-CM

## 2023-01-01 DIAGNOSIS — Z98.41 CATARACT EXTRACTION STATUS, RIGHT EYE: Chronic | ICD-10-CM

## 2023-01-01 DIAGNOSIS — N18.6 END STAGE RENAL DISEASE: ICD-10-CM

## 2023-01-01 DIAGNOSIS — Z87.448 PERSONAL HISTORY OF OTHER DISEASES OF URINARY SYSTEM: Chronic | ICD-10-CM

## 2023-01-01 DIAGNOSIS — Z98.890 OTHER SPECIFIED POSTPROCEDURAL STATES: Chronic | ICD-10-CM

## 2023-01-01 DIAGNOSIS — J44.1 CHRONIC OBSTRUCTIVE PULMONARY DISEASE WITH (ACUTE) EXACERBATION: ICD-10-CM

## 2023-01-01 DIAGNOSIS — Z29.9 ENCOUNTER FOR PROPHYLACTIC MEASURES, UNSPECIFIED: ICD-10-CM

## 2023-01-01 DIAGNOSIS — D64.9 ANEMIA, UNSPECIFIED: ICD-10-CM

## 2023-01-01 DIAGNOSIS — Z89.512 ACQUIRED ABSENCE OF LEFT LEG BELOW KNEE: Chronic | ICD-10-CM

## 2023-01-01 DIAGNOSIS — I50.9 HEART FAILURE, UNSPECIFIED: ICD-10-CM

## 2023-01-01 DIAGNOSIS — E11.9 TYPE 2 DIABETES MELLITUS WITHOUT COMPLICATIONS: ICD-10-CM

## 2023-01-01 DIAGNOSIS — K31.84 GASTROPARESIS: ICD-10-CM

## 2023-01-01 DIAGNOSIS — N18.9 CHRONIC KIDNEY DISEASE, UNSPECIFIED: ICD-10-CM

## 2023-01-01 DIAGNOSIS — R09.02 HYPOXEMIA: ICD-10-CM

## 2023-01-01 DIAGNOSIS — E03.9 HYPOTHYROIDISM, UNSPECIFIED: ICD-10-CM

## 2023-01-01 DIAGNOSIS — Z02.9 ENCOUNTER FOR ADMINISTRATIVE EXAMINATIONS, UNSPECIFIED: ICD-10-CM

## 2023-01-01 DIAGNOSIS — I25.10 ATHEROSCLEROTIC HEART DISEASE OF NATIVE CORONARY ARTERY WITHOUT ANGINA PECTORIS: ICD-10-CM

## 2023-01-01 DIAGNOSIS — K92.2 GASTROINTESTINAL HEMORRHAGE, UNSPECIFIED: ICD-10-CM

## 2023-01-01 DIAGNOSIS — E78.5 HYPERLIPIDEMIA, UNSPECIFIED: ICD-10-CM

## 2023-01-01 DIAGNOSIS — E83.39 OTHER DISORDERS OF PHOSPHORUS METABOLISM: ICD-10-CM

## 2023-01-01 DIAGNOSIS — R79.89 OTHER SPECIFIED ABNORMAL FINDINGS OF BLOOD CHEMISTRY: ICD-10-CM

## 2023-01-01 DIAGNOSIS — H40.9 UNSPECIFIED GLAUCOMA: ICD-10-CM

## 2023-01-01 DIAGNOSIS — E87.70 FLUID OVERLOAD, UNSPECIFIED: ICD-10-CM

## 2023-01-01 DIAGNOSIS — Z86.59 PERSONAL HISTORY OF OTHER MENTAL AND BEHAVIORAL DISORDERS: ICD-10-CM

## 2023-01-01 DIAGNOSIS — M54.2 CERVICALGIA: ICD-10-CM

## 2023-01-01 DIAGNOSIS — M54.9 DORSALGIA, UNSPECIFIED: ICD-10-CM

## 2023-01-01 DIAGNOSIS — Z51.5 ENCOUNTER FOR PALLIATIVE CARE: ICD-10-CM

## 2023-01-01 DIAGNOSIS — J44.9 CHRONIC OBSTRUCTIVE PULMONARY DISEASE, UNSPECIFIED: ICD-10-CM

## 2023-01-01 DIAGNOSIS — E16.2 HYPOGLYCEMIA, UNSPECIFIED: ICD-10-CM

## 2023-01-01 DIAGNOSIS — D61.818 OTHER PANCYTOPENIA: ICD-10-CM

## 2023-01-01 DIAGNOSIS — I50.43 ACUTE ON CHRONIC COMBINED SYSTOLIC (CONGESTIVE) AND DIASTOLIC (CONGESTIVE) HEART FAILURE: ICD-10-CM

## 2023-01-01 DIAGNOSIS — E44.0 MODERATE PROTEIN-CALORIE MALNUTRITION: ICD-10-CM

## 2023-01-01 DIAGNOSIS — F32.9 MAJOR DEPRESSIVE DISORDER, SINGLE EPISODE, UNSPECIFIED: ICD-10-CM

## 2023-01-01 DIAGNOSIS — R26.9 UNSPECIFIED ABNORMALITIES OF GAIT AND MOBILITY: ICD-10-CM

## 2023-01-01 DIAGNOSIS — R19.7 DIARRHEA, UNSPECIFIED: ICD-10-CM

## 2023-01-01 DIAGNOSIS — M19.90 UNSPECIFIED OSTEOARTHRITIS, UNSPECIFIED SITE: ICD-10-CM

## 2023-01-01 DIAGNOSIS — K80.20 CALCULUS OF GALLBLADDER WITHOUT CHOLECYSTITIS WITHOUT OBSTRUCTION: ICD-10-CM

## 2023-01-01 DIAGNOSIS — J18.9 PNEUMONIA, UNSPECIFIED ORGANISM: ICD-10-CM

## 2023-01-01 DIAGNOSIS — K92.0 HEMATEMESIS: ICD-10-CM

## 2023-01-01 DIAGNOSIS — Z91.89 OTHER SPECIFIED PERSONAL RISK FACTORS, NOT ELSEWHERE CLASSIFIED: ICD-10-CM

## 2023-01-01 DIAGNOSIS — E87.5 HYPERKALEMIA: ICD-10-CM

## 2023-01-01 DIAGNOSIS — R62.7 ADULT FAILURE TO THRIVE: ICD-10-CM

## 2023-01-01 DIAGNOSIS — T82.898A OTHER SPECIFIED COMPLICATION OF VASCULAR PROSTHETIC DEVICES, IMPLANTS AND GRAFTS, INITIAL ENCOUNTER: ICD-10-CM

## 2023-01-01 DIAGNOSIS — F43.20 ADJUSTMENT DISORDER, UNSPECIFIED: ICD-10-CM

## 2023-01-01 DIAGNOSIS — I50.32 CHRONIC DIASTOLIC (CONGESTIVE) HEART FAILURE: ICD-10-CM

## 2023-01-01 DIAGNOSIS — R53.81 OTHER MALAISE: ICD-10-CM

## 2023-01-01 DIAGNOSIS — R11.0 NAUSEA: ICD-10-CM

## 2023-01-01 DIAGNOSIS — F41.9 ANXIETY DISORDER, UNSPECIFIED: ICD-10-CM

## 2023-01-01 DIAGNOSIS — J96.01 ACUTE RESPIRATORY FAILURE WITH HYPOXIA: ICD-10-CM

## 2023-01-01 DIAGNOSIS — R41.0 DISORIENTATION, UNSPECIFIED: ICD-10-CM

## 2023-01-01 DIAGNOSIS — J40 BRONCHITIS, NOT SPECIFIED AS ACUTE OR CHRONIC: ICD-10-CM

## 2023-01-01 DIAGNOSIS — F41.0 PANIC DISORDER [EPISODIC PAROXYSMAL ANXIETY]: ICD-10-CM

## 2023-01-01 DIAGNOSIS — Z99.2 END STAGE RENAL DISEASE: ICD-10-CM

## 2023-01-01 LAB
A1C WITH ESTIMATED AVERAGE GLUCOSE RESULT: 5 % — SIGNIFICANT CHANGE UP (ref 4–5.6)
A1C WITH ESTIMATED AVERAGE GLUCOSE RESULT: 5.3 % — SIGNIFICANT CHANGE UP (ref 4–5.6)
A1C WITH ESTIMATED AVERAGE GLUCOSE RESULT: 5.3 % — SIGNIFICANT CHANGE UP (ref 4–5.6)
A1C WITH ESTIMATED AVERAGE GLUCOSE RESULT: 5.7 % — HIGH (ref 4–5.6)
A1C WITH ESTIMATED AVERAGE GLUCOSE RESULT: 6.1 % — HIGH (ref 4–5.6)
ACANTHOCYTES BLD QL SMEAR: SLIGHT — SIGNIFICANT CHANGE UP
ACANTHOCYTES BLD QL SMEAR: SLIGHT — SIGNIFICANT CHANGE UP
ACETONE SERPL-MCNC: NEGATIVE — SIGNIFICANT CHANGE UP
AFP-TM SERPL-MCNC: 8 NG/ML — SIGNIFICANT CHANGE UP
ALBUMIN SERPL ELPH-MCNC: 2.4 G/DL — LOW (ref 3.5–5)
ALBUMIN SERPL ELPH-MCNC: 2.6 G/DL — LOW (ref 3.5–5)
ALBUMIN SERPL ELPH-MCNC: 2.6 G/DL — LOW (ref 3.5–5)
ALBUMIN SERPL ELPH-MCNC: 2.7 G/DL — LOW (ref 3.5–5)
ALBUMIN SERPL ELPH-MCNC: 2.8 G/DL — LOW (ref 3.5–5)
ALBUMIN SERPL ELPH-MCNC: 2.8 G/DL — LOW (ref 3.5–5)
ALBUMIN SERPL ELPH-MCNC: 2.9 G/DL — LOW (ref 3.5–5)
ALBUMIN SERPL ELPH-MCNC: 3 G/DL — LOW (ref 3.5–5)
ALBUMIN SERPL ELPH-MCNC: 3.1 G/DL — LOW (ref 3.5–5)
ALBUMIN SERPL ELPH-MCNC: 3.2 G/DL — LOW (ref 3.5–5)
ALBUMIN SERPL ELPH-MCNC: 3.2 G/DL — LOW (ref 3.5–5)
ALP SERPL-CCNC: 103 U/L — SIGNIFICANT CHANGE UP (ref 40–120)
ALP SERPL-CCNC: 105 U/L — SIGNIFICANT CHANGE UP (ref 40–120)
ALP SERPL-CCNC: 107 U/L — SIGNIFICANT CHANGE UP (ref 40–120)
ALP SERPL-CCNC: 134 U/L — HIGH (ref 40–120)
ALP SERPL-CCNC: 163 U/L — HIGH (ref 40–120)
ALP SERPL-CCNC: 163 U/L — HIGH (ref 40–120)
ALP SERPL-CCNC: 58 U/L — SIGNIFICANT CHANGE UP (ref 40–120)
ALP SERPL-CCNC: 61 U/L — SIGNIFICANT CHANGE UP (ref 40–120)
ALP SERPL-CCNC: 69 U/L — SIGNIFICANT CHANGE UP (ref 40–120)
ALP SERPL-CCNC: 71 U/L — SIGNIFICANT CHANGE UP (ref 40–120)
ALP SERPL-CCNC: 73 U/L — SIGNIFICANT CHANGE UP (ref 40–120)
ALP SERPL-CCNC: 75 U/L — SIGNIFICANT CHANGE UP (ref 40–120)
ALP SERPL-CCNC: 76 U/L — SIGNIFICANT CHANGE UP (ref 40–120)
ALP SERPL-CCNC: 76 U/L — SIGNIFICANT CHANGE UP (ref 40–120)
ALP SERPL-CCNC: 77 U/L — SIGNIFICANT CHANGE UP (ref 40–120)
ALP SERPL-CCNC: 77 U/L — SIGNIFICANT CHANGE UP (ref 40–120)
ALP SERPL-CCNC: 79 U/L — SIGNIFICANT CHANGE UP (ref 40–120)
ALP SERPL-CCNC: 79 U/L — SIGNIFICANT CHANGE UP (ref 40–120)
ALP SERPL-CCNC: 83 U/L — SIGNIFICANT CHANGE UP (ref 40–120)
ALP SERPL-CCNC: 84 U/L — SIGNIFICANT CHANGE UP (ref 40–120)
ALP SERPL-CCNC: 89 U/L — SIGNIFICANT CHANGE UP (ref 40–120)
ALP SERPL-CCNC: 97 U/L — SIGNIFICANT CHANGE UP (ref 40–120)
ALT FLD-CCNC: 10 U/L DA — SIGNIFICANT CHANGE UP (ref 10–60)
ALT FLD-CCNC: 106 U/L DA — HIGH (ref 10–60)
ALT FLD-CCNC: 106 U/L DA — HIGH (ref 10–60)
ALT FLD-CCNC: 11 U/L DA — SIGNIFICANT CHANGE UP (ref 10–60)
ALT FLD-CCNC: 11 U/L DA — SIGNIFICANT CHANGE UP (ref 10–60)
ALT FLD-CCNC: 12 U/L DA — SIGNIFICANT CHANGE UP (ref 10–60)
ALT FLD-CCNC: 14 U/L DA — SIGNIFICANT CHANGE UP (ref 10–60)
ALT FLD-CCNC: 14 U/L DA — SIGNIFICANT CHANGE UP (ref 10–60)
ALT FLD-CCNC: 16 U/L DA — SIGNIFICANT CHANGE UP (ref 10–60)
ALT FLD-CCNC: 21 U/L DA — SIGNIFICANT CHANGE UP (ref 10–60)
ALT FLD-CCNC: 30 U/L DA — SIGNIFICANT CHANGE UP (ref 10–60)
ALT FLD-CCNC: 30 U/L DA — SIGNIFICANT CHANGE UP (ref 10–60)
ALT FLD-CCNC: 31 U/L DA — SIGNIFICANT CHANGE UP (ref 10–60)
ALT FLD-CCNC: 36 U/L DA — SIGNIFICANT CHANGE UP (ref 10–60)
ALT FLD-CCNC: 41 U/L DA — SIGNIFICANT CHANGE UP (ref 10–60)
ALT FLD-CCNC: 42 U/L DA — SIGNIFICANT CHANGE UP (ref 10–60)
ALT FLD-CCNC: 42 U/L DA — SIGNIFICANT CHANGE UP (ref 10–60)
ALT FLD-CCNC: 54 U/L DA — SIGNIFICANT CHANGE UP (ref 10–60)
ALT FLD-CCNC: 73 U/L DA — HIGH (ref 10–60)
ALT FLD-CCNC: 76 U/L DA — HIGH (ref 10–60)
ALT FLD-CCNC: 91 U/L DA — HIGH (ref 10–60)
ANION GAP SERPL CALC-SCNC: 10 MMOL/L — SIGNIFICANT CHANGE UP (ref 5–17)
ANION GAP SERPL CALC-SCNC: 11 MMOL/L — SIGNIFICANT CHANGE UP (ref 5–17)
ANION GAP SERPL CALC-SCNC: 12 MMOL/L — SIGNIFICANT CHANGE UP (ref 5–17)
ANION GAP SERPL CALC-SCNC: 13 MMOL/L — SIGNIFICANT CHANGE UP (ref 5–17)
ANION GAP SERPL CALC-SCNC: 14 MMOL/L — SIGNIFICANT CHANGE UP (ref 5–17)
ANION GAP SERPL CALC-SCNC: 14 MMOL/L — SIGNIFICANT CHANGE UP (ref 5–17)
ANION GAP SERPL CALC-SCNC: 15 MMOL/L — SIGNIFICANT CHANGE UP (ref 5–17)
ANION GAP SERPL CALC-SCNC: 16 MMOL/L — SIGNIFICANT CHANGE UP (ref 5–17)
ANION GAP SERPL CALC-SCNC: 16 MMOL/L — SIGNIFICANT CHANGE UP (ref 5–17)
ANION GAP SERPL CALC-SCNC: 17 MMOL/L — SIGNIFICANT CHANGE UP (ref 5–17)
ANION GAP SERPL CALC-SCNC: 18 MMOL/L — HIGH (ref 5–17)
ANION GAP SERPL CALC-SCNC: 18 MMOL/L — HIGH (ref 5–17)
ANION GAP SERPL CALC-SCNC: 19 MMOL/L — HIGH (ref 5–17)
ANION GAP SERPL CALC-SCNC: 6 MMOL/L — SIGNIFICANT CHANGE UP (ref 5–17)
ANION GAP SERPL CALC-SCNC: 6 MMOL/L — SIGNIFICANT CHANGE UP (ref 5–17)
ANION GAP SERPL CALC-SCNC: 7 MMOL/L — SIGNIFICANT CHANGE UP (ref 5–17)
ANION GAP SERPL CALC-SCNC: 8 MMOL/L — SIGNIFICANT CHANGE UP (ref 5–17)
ANION GAP SERPL CALC-SCNC: 9 MMOL/L — SIGNIFICANT CHANGE UP (ref 5–17)
ANISOCYTOSIS BLD QL: SIGNIFICANT CHANGE UP
ANISOCYTOSIS BLD QL: SIGNIFICANT CHANGE UP
ANISOCYTOSIS BLD QL: SLIGHT — SIGNIFICANT CHANGE UP
APTT BLD: 28.4 SEC — SIGNIFICANT CHANGE UP (ref 27.5–35.5)
APTT BLD: 32.8 SEC — SIGNIFICANT CHANGE UP (ref 24.5–35.6)
APTT BLD: 32.8 SEC — SIGNIFICANT CHANGE UP (ref 24.5–35.6)
APTT BLD: 34.9 SEC — SIGNIFICANT CHANGE UP (ref 24.5–35.6)
APTT BLD: 34.9 SEC — SIGNIFICANT CHANGE UP (ref 24.5–35.6)
APTT BLD: 43.3 SEC — HIGH (ref 27.5–35.5)
AST SERPL-CCNC: 10 U/L — SIGNIFICANT CHANGE UP (ref 10–40)
AST SERPL-CCNC: 15 U/L — SIGNIFICANT CHANGE UP (ref 10–40)
AST SERPL-CCNC: 17 U/L — SIGNIFICANT CHANGE UP (ref 10–40)
AST SERPL-CCNC: 17 U/L — SIGNIFICANT CHANGE UP (ref 10–40)
AST SERPL-CCNC: 18 U/L — SIGNIFICANT CHANGE UP (ref 10–40)
AST SERPL-CCNC: 19 U/L — SIGNIFICANT CHANGE UP (ref 10–40)
AST SERPL-CCNC: 19 U/L — SIGNIFICANT CHANGE UP (ref 10–40)
AST SERPL-CCNC: 24 U/L — SIGNIFICANT CHANGE UP (ref 10–40)
AST SERPL-CCNC: 24 U/L — SIGNIFICANT CHANGE UP (ref 10–40)
AST SERPL-CCNC: 25 U/L — SIGNIFICANT CHANGE UP (ref 10–40)
AST SERPL-CCNC: 25 U/L — SIGNIFICANT CHANGE UP (ref 10–40)
AST SERPL-CCNC: 26 U/L — SIGNIFICANT CHANGE UP (ref 10–40)
AST SERPL-CCNC: 27 U/L — SIGNIFICANT CHANGE UP (ref 10–40)
AST SERPL-CCNC: 36 U/L — SIGNIFICANT CHANGE UP (ref 10–40)
AST SERPL-CCNC: 39 U/L — SIGNIFICANT CHANGE UP (ref 10–40)
AST SERPL-CCNC: 40 U/L — SIGNIFICANT CHANGE UP (ref 10–40)
AST SERPL-CCNC: 44 U/L — HIGH (ref 10–40)
AST SERPL-CCNC: 45 U/L — HIGH (ref 10–40)
AST SERPL-CCNC: 50 U/L — HIGH (ref 10–40)
AST SERPL-CCNC: 8 U/L — LOW (ref 10–40)
AST SERPL-CCNC: 8 U/L — LOW (ref 10–40)
AST SERPL-CCNC: 9 U/L — LOW (ref 10–40)
BASE EXCESS BLDV CALC-SCNC: -1.1 MMOL/L — SIGNIFICANT CHANGE UP
BASOPHILS # BLD AUTO: 0 K/UL — SIGNIFICANT CHANGE UP (ref 0–0.2)
BASOPHILS # BLD AUTO: 0.01 K/UL — SIGNIFICANT CHANGE UP (ref 0–0.2)
BASOPHILS # BLD AUTO: 0.02 K/UL — SIGNIFICANT CHANGE UP (ref 0–0.2)
BASOPHILS NFR BLD AUTO: 0 % — SIGNIFICANT CHANGE UP (ref 0–2)
BASOPHILS NFR BLD AUTO: 0.2 % — SIGNIFICANT CHANGE UP (ref 0–2)
BASOPHILS NFR BLD AUTO: 0.2 % — SIGNIFICANT CHANGE UP (ref 0–2)
BASOPHILS NFR BLD AUTO: 0.3 % — SIGNIFICANT CHANGE UP (ref 0–2)
BASOPHILS NFR BLD AUTO: 0.4 % — SIGNIFICANT CHANGE UP (ref 0–2)
BASOPHILS NFR BLD AUTO: 0.4 % — SIGNIFICANT CHANGE UP (ref 0–2)
BASOPHILS NFR BLD AUTO: 0.5 % — SIGNIFICANT CHANGE UP (ref 0–2)
BASOPHILS NFR BLD AUTO: 0.7 % — SIGNIFICANT CHANGE UP (ref 0–2)
BASOPHILS NFR BLD AUTO: 0.8 % — SIGNIFICANT CHANGE UP (ref 0–2)
BILIRUB SERPL-MCNC: 0.3 MG/DL — SIGNIFICANT CHANGE UP (ref 0.2–1.2)
BILIRUB SERPL-MCNC: 0.4 MG/DL — SIGNIFICANT CHANGE UP (ref 0.2–1.2)
BILIRUB SERPL-MCNC: 0.5 MG/DL — SIGNIFICANT CHANGE UP (ref 0.2–1.2)
BILIRUB SERPL-MCNC: 0.6 MG/DL — SIGNIFICANT CHANGE UP (ref 0.2–1.2)
BILIRUB SERPL-MCNC: 0.7 MG/DL — SIGNIFICANT CHANGE UP (ref 0.2–1.2)
BILIRUB SERPL-MCNC: 0.7 MG/DL — SIGNIFICANT CHANGE UP (ref 0.2–1.2)
BILIRUB SERPL-MCNC: 0.8 MG/DL — SIGNIFICANT CHANGE UP (ref 0.2–1.2)
BLD GP AB SCN SERPL QL: SIGNIFICANT CHANGE UP
BUN SERPL-MCNC: 101 MG/DL — HIGH (ref 7–18)
BUN SERPL-MCNC: 102 MG/DL — HIGH (ref 7–18)
BUN SERPL-MCNC: 106 MG/DL — HIGH (ref 7–18)
BUN SERPL-MCNC: 113 MG/DL — HIGH (ref 7–18)
BUN SERPL-MCNC: 17 MG/DL — SIGNIFICANT CHANGE UP (ref 7–18)
BUN SERPL-MCNC: 24 MG/DL — HIGH (ref 7–18)
BUN SERPL-MCNC: 24 MG/DL — HIGH (ref 7–18)
BUN SERPL-MCNC: 25 MG/DL — HIGH (ref 7–18)
BUN SERPL-MCNC: 26 MG/DL — HIGH (ref 7–18)
BUN SERPL-MCNC: 28 MG/DL — HIGH (ref 7–18)
BUN SERPL-MCNC: 30 MG/DL — HIGH (ref 7–18)
BUN SERPL-MCNC: 32 MG/DL — HIGH (ref 7–18)
BUN SERPL-MCNC: 35 MG/DL — HIGH (ref 7–18)
BUN SERPL-MCNC: 36 MG/DL — HIGH (ref 7–18)
BUN SERPL-MCNC: 36 MG/DL — HIGH (ref 7–18)
BUN SERPL-MCNC: 37 MG/DL — HIGH (ref 7–18)
BUN SERPL-MCNC: 38 MG/DL — HIGH (ref 7–18)
BUN SERPL-MCNC: 40 MG/DL — HIGH (ref 7–18)
BUN SERPL-MCNC: 43 MG/DL — HIGH (ref 7–18)
BUN SERPL-MCNC: 44 MG/DL — HIGH (ref 7–18)
BUN SERPL-MCNC: 45 MG/DL — HIGH (ref 7–18)
BUN SERPL-MCNC: 46 MG/DL — HIGH (ref 7–18)
BUN SERPL-MCNC: 46 MG/DL — HIGH (ref 7–18)
BUN SERPL-MCNC: 47 MG/DL — HIGH (ref 7–18)
BUN SERPL-MCNC: 47 MG/DL — HIGH (ref 7–18)
BUN SERPL-MCNC: 48 MG/DL — HIGH (ref 7–18)
BUN SERPL-MCNC: 48 MG/DL — HIGH (ref 7–18)
BUN SERPL-MCNC: 49 MG/DL — HIGH (ref 7–18)
BUN SERPL-MCNC: 50 MG/DL — HIGH (ref 7–18)
BUN SERPL-MCNC: 50 MG/DL — HIGH (ref 7–18)
BUN SERPL-MCNC: 52 MG/DL — HIGH (ref 7–18)
BUN SERPL-MCNC: 52 MG/DL — HIGH (ref 7–18)
BUN SERPL-MCNC: 56 MG/DL — HIGH (ref 7–18)
BUN SERPL-MCNC: 56 MG/DL — HIGH (ref 7–18)
BUN SERPL-MCNC: 57 MG/DL — HIGH (ref 7–18)
BUN SERPL-MCNC: 58 MG/DL — HIGH (ref 7–18)
BUN SERPL-MCNC: 60 MG/DL — HIGH (ref 7–18)
BUN SERPL-MCNC: 62 MG/DL — HIGH (ref 7–18)
BUN SERPL-MCNC: 63 MG/DL — HIGH (ref 7–18)
BUN SERPL-MCNC: 63 MG/DL — HIGH (ref 7–18)
BUN SERPL-MCNC: 68 MG/DL — HIGH (ref 7–18)
BUN SERPL-MCNC: 71 MG/DL — HIGH (ref 7–18)
BUN SERPL-MCNC: 72 MG/DL — HIGH (ref 7–18)
BUN SERPL-MCNC: 80 MG/DL — HIGH (ref 7–18)
BUN SERPL-MCNC: 80 MG/DL — HIGH (ref 7–18)
BUN SERPL-MCNC: 82 MG/DL — HIGH (ref 7–18)
BUN SERPL-MCNC: 83 MG/DL — HIGH (ref 7–18)
BUN SERPL-MCNC: 87 MG/DL — HIGH (ref 7–18)
BUN SERPL-MCNC: 88 MG/DL — HIGH (ref 7–18)
BUN SERPL-MCNC: 88 MG/DL — HIGH (ref 7–18)
BUN SERPL-MCNC: 93 MG/DL — HIGH (ref 7–18)
BUN SERPL-MCNC: 95 MG/DL — HIGH (ref 7–18)
C DIFF BY PCR RESULT: SIGNIFICANT CHANGE UP
C DIFF BY PCR RESULT: SIGNIFICANT CHANGE UP
CA-I SERPL-SCNC: SIGNIFICANT CHANGE UP MMOL/L (ref 1.15–1.33)
CALCIUM SERPL-MCNC: 6.4 MG/DL — CRITICAL LOW (ref 8.4–10.5)
CALCIUM SERPL-MCNC: 6.4 MG/DL — CRITICAL LOW (ref 8.4–10.5)
CALCIUM SERPL-MCNC: 7.2 MG/DL — LOW (ref 8.4–10.5)
CALCIUM SERPL-MCNC: 7.5 MG/DL — LOW (ref 8.4–10.5)
CALCIUM SERPL-MCNC: 7.6 MG/DL — LOW (ref 8.4–10.5)
CALCIUM SERPL-MCNC: 7.7 MG/DL — LOW (ref 8.4–10.5)
CALCIUM SERPL-MCNC: 7.8 MG/DL — LOW (ref 8.4–10.5)
CALCIUM SERPL-MCNC: 7.9 MG/DL — LOW (ref 8.4–10.5)
CALCIUM SERPL-MCNC: 8 MG/DL — LOW (ref 8.4–10.5)
CALCIUM SERPL-MCNC: 8.1 MG/DL — LOW (ref 8.4–10.5)
CALCIUM SERPL-MCNC: 8.2 MG/DL — LOW (ref 8.4–10.5)
CALCIUM SERPL-MCNC: 8.3 MG/DL — LOW (ref 8.4–10.5)
CALCIUM SERPL-MCNC: 8.4 MG/DL — SIGNIFICANT CHANGE UP (ref 8.4–10.5)
CALCIUM SERPL-MCNC: 8.5 MG/DL — SIGNIFICANT CHANGE UP (ref 8.4–10.5)
CALCIUM SERPL-MCNC: 8.6 MG/DL — SIGNIFICANT CHANGE UP (ref 8.4–10.5)
CALCIUM SERPL-MCNC: 8.7 MG/DL — SIGNIFICANT CHANGE UP (ref 8.4–10.5)
CALCIUM SERPL-MCNC: 8.9 MG/DL — SIGNIFICANT CHANGE UP (ref 8.4–10.5)
CALCIUM SERPL-MCNC: 8.9 MG/DL — SIGNIFICANT CHANGE UP (ref 8.4–10.5)
CALCIUM SERPL-MCNC: 9 MG/DL — SIGNIFICANT CHANGE UP (ref 8.4–10.5)
CALCIUM SERPL-MCNC: 9.1 MG/DL — SIGNIFICANT CHANGE UP (ref 8.4–10.5)
CALCIUM SERPL-MCNC: 9.2 MG/DL — SIGNIFICANT CHANGE UP (ref 8.4–10.5)
CANCER AG19-9 SERPL-ACNC: <2 U/ML — SIGNIFICANT CHANGE UP
CEA SERPL-MCNC: 9.4 NG/ML — HIGH (ref 0–3.8)
CHLORIDE SERPL-SCNC: 100 MMOL/L — SIGNIFICANT CHANGE UP (ref 96–108)
CHLORIDE SERPL-SCNC: 101 MMOL/L — SIGNIFICANT CHANGE UP (ref 96–108)
CHLORIDE SERPL-SCNC: 102 MMOL/L — SIGNIFICANT CHANGE UP (ref 96–108)
CHLORIDE SERPL-SCNC: 103 MMOL/L — SIGNIFICANT CHANGE UP (ref 96–108)
CHLORIDE SERPL-SCNC: 104 MMOL/L — SIGNIFICANT CHANGE UP (ref 96–108)
CHLORIDE SERPL-SCNC: 105 MMOL/L — SIGNIFICANT CHANGE UP (ref 96–108)
CHLORIDE SERPL-SCNC: 105 MMOL/L — SIGNIFICANT CHANGE UP (ref 96–108)
CHLORIDE SERPL-SCNC: 106 MMOL/L — SIGNIFICANT CHANGE UP (ref 96–108)
CHLORIDE SERPL-SCNC: 106 MMOL/L — SIGNIFICANT CHANGE UP (ref 96–108)
CHLORIDE SERPL-SCNC: 93 MMOL/L — LOW (ref 96–108)
CHLORIDE SERPL-SCNC: 94 MMOL/L — LOW (ref 96–108)
CHLORIDE SERPL-SCNC: 95 MMOL/L — LOW (ref 96–108)
CHLORIDE SERPL-SCNC: 96 MMOL/L — SIGNIFICANT CHANGE UP (ref 96–108)
CHLORIDE SERPL-SCNC: 97 MMOL/L — SIGNIFICANT CHANGE UP (ref 96–108)
CHLORIDE SERPL-SCNC: 98 MMOL/L — SIGNIFICANT CHANGE UP (ref 96–108)
CHLORIDE SERPL-SCNC: 99 MMOL/L — SIGNIFICANT CHANGE UP (ref 96–108)
CK MB CFR SERPL CALC: 5.4 NG/ML — HIGH (ref 0–3.6)
CK MB CFR SERPL CALC: 5.4 NG/ML — HIGH (ref 0–3.6)
CK MB CFR SERPL CALC: 6.1 NG/ML — HIGH (ref 0–3.6)
CK SERPL-CCNC: 257 U/L — HIGH (ref 35–232)
CK SERPL-CCNC: 257 U/L — HIGH (ref 35–232)
CO2 SERPL-SCNC: 12 MMOL/L — LOW (ref 22–31)
CO2 SERPL-SCNC: 15 MMOL/L — LOW (ref 22–31)
CO2 SERPL-SCNC: 16 MMOL/L — LOW (ref 22–31)
CO2 SERPL-SCNC: 16 MMOL/L — LOW (ref 22–31)
CO2 SERPL-SCNC: 17 MMOL/L — LOW (ref 22–31)
CO2 SERPL-SCNC: 21 MMOL/L — LOW (ref 22–31)
CO2 SERPL-SCNC: 22 MMOL/L — SIGNIFICANT CHANGE UP (ref 22–31)
CO2 SERPL-SCNC: 23 MMOL/L — SIGNIFICANT CHANGE UP (ref 22–31)
CO2 SERPL-SCNC: 24 MMOL/L — SIGNIFICANT CHANGE UP (ref 22–31)
CO2 SERPL-SCNC: 25 MMOL/L — SIGNIFICANT CHANGE UP (ref 22–31)
CO2 SERPL-SCNC: 26 MMOL/L — SIGNIFICANT CHANGE UP (ref 22–31)
CO2 SERPL-SCNC: 27 MMOL/L — SIGNIFICANT CHANGE UP (ref 22–31)
CO2 SERPL-SCNC: 28 MMOL/L — SIGNIFICANT CHANGE UP (ref 22–31)
CO2 SERPL-SCNC: 29 MMOL/L — SIGNIFICANT CHANGE UP (ref 22–31)
CO2 SERPL-SCNC: 29 MMOL/L — SIGNIFICANT CHANGE UP (ref 22–31)
CO2 SERPL-SCNC: 30 MMOL/L — SIGNIFICANT CHANGE UP (ref 22–31)
CO2 SERPL-SCNC: 31 MMOL/L — SIGNIFICANT CHANGE UP (ref 22–31)
CO2 SERPL-SCNC: 31 MMOL/L — SIGNIFICANT CHANGE UP (ref 22–31)
CO2 SERPL-SCNC: 32 MMOL/L — HIGH (ref 22–31)
CREAT SERPL-MCNC: 10.4 MG/DL — HIGH (ref 0.5–1.3)
CREAT SERPL-MCNC: 10.6 MG/DL — HIGH (ref 0.5–1.3)
CREAT SERPL-MCNC: 10.7 MG/DL — HIGH (ref 0.5–1.3)
CREAT SERPL-MCNC: 10.7 MG/DL — HIGH (ref 0.5–1.3)
CREAT SERPL-MCNC: 10.8 MG/DL — HIGH (ref 0.5–1.3)
CREAT SERPL-MCNC: 10.8 MG/DL — HIGH (ref 0.5–1.3)
CREAT SERPL-MCNC: 10.9 MG/DL — HIGH (ref 0.5–1.3)
CREAT SERPL-MCNC: 11 MG/DL — HIGH (ref 0.5–1.3)
CREAT SERPL-MCNC: 11 MG/DL — HIGH (ref 0.5–1.3)
CREAT SERPL-MCNC: 11.3 MG/DL — HIGH (ref 0.5–1.3)
CREAT SERPL-MCNC: 11.5 MG/DL — HIGH (ref 0.5–1.3)
CREAT SERPL-MCNC: 11.7 MG/DL — HIGH (ref 0.5–1.3)
CREAT SERPL-MCNC: 11.7 MG/DL — HIGH (ref 0.5–1.3)
CREAT SERPL-MCNC: 12 MG/DL — HIGH (ref 0.5–1.3)
CREAT SERPL-MCNC: 12.1 MG/DL — HIGH (ref 0.5–1.3)
CREAT SERPL-MCNC: 12.3 MG/DL — HIGH (ref 0.5–1.3)
CREAT SERPL-MCNC: 12.4 MG/DL — HIGH (ref 0.5–1.3)
CREAT SERPL-MCNC: 12.5 MG/DL — HIGH (ref 0.5–1.3)
CREAT SERPL-MCNC: 12.7 MG/DL — HIGH (ref 0.5–1.3)
CREAT SERPL-MCNC: 12.7 MG/DL — HIGH (ref 0.5–1.3)
CREAT SERPL-MCNC: 12.9 MG/DL — HIGH (ref 0.5–1.3)
CREAT SERPL-MCNC: 13.2 MG/DL — HIGH (ref 0.5–1.3)
CREAT SERPL-MCNC: 13.3 MG/DL — HIGH (ref 0.5–1.3)
CREAT SERPL-MCNC: 13.3 MG/DL — HIGH (ref 0.5–1.3)
CREAT SERPL-MCNC: 13.8 MG/DL — HIGH (ref 0.5–1.3)
CREAT SERPL-MCNC: 14.4 MG/DL — HIGH (ref 0.5–1.3)
CREAT SERPL-MCNC: 15.4 MG/DL — HIGH (ref 0.5–1.3)
CREAT SERPL-MCNC: 15.6 MG/DL — HIGH (ref 0.5–1.3)
CREAT SERPL-MCNC: 15.8 MG/DL — HIGH (ref 0.5–1.3)
CREAT SERPL-MCNC: 15.9 MG/DL — HIGH (ref 0.5–1.3)
CREAT SERPL-MCNC: 16.1 MG/DL — HIGH (ref 0.5–1.3)
CREAT SERPL-MCNC: 16.6 MG/DL — HIGH (ref 0.5–1.3)
CREAT SERPL-MCNC: 16.9 MG/DL — HIGH (ref 0.5–1.3)
CREAT SERPL-MCNC: 17.2 MG/DL — HIGH (ref 0.5–1.3)
CREAT SERPL-MCNC: 17.4 MG/DL — HIGH (ref 0.5–1.3)
CREAT SERPL-MCNC: 17.5 MG/DL — HIGH (ref 0.5–1.3)
CREAT SERPL-MCNC: 6.62 MG/DL — HIGH (ref 0.5–1.3)
CREAT SERPL-MCNC: 6.62 MG/DL — HIGH (ref 0.5–1.3)
CREAT SERPL-MCNC: 6.65 MG/DL — HIGH (ref 0.5–1.3)
CREAT SERPL-MCNC: 7.82 MG/DL — HIGH (ref 0.5–1.3)
CREAT SERPL-MCNC: 7.87 MG/DL — HIGH (ref 0.5–1.3)
CREAT SERPL-MCNC: 8.05 MG/DL — HIGH (ref 0.5–1.3)
CREAT SERPL-MCNC: 8.21 MG/DL — HIGH (ref 0.5–1.3)
CREAT SERPL-MCNC: 8.43 MG/DL — HIGH (ref 0.5–1.3)
CREAT SERPL-MCNC: 8.5 MG/DL — HIGH (ref 0.5–1.3)
CREAT SERPL-MCNC: 8.56 MG/DL — HIGH (ref 0.5–1.3)
CREAT SERPL-MCNC: 8.7 MG/DL — HIGH (ref 0.5–1.3)
CREAT SERPL-MCNC: 8.7 MG/DL — HIGH (ref 0.5–1.3)
CREAT SERPL-MCNC: 8.76 MG/DL — HIGH (ref 0.5–1.3)
CREAT SERPL-MCNC: 9.81 MG/DL — HIGH (ref 0.5–1.3)
CRP SERPL-MCNC: 14 MG/L — HIGH
CULTURE RESULTS: SIGNIFICANT CHANGE UP
EGFR: 3 ML/MIN/1.73M2 — LOW
EGFR: 4 ML/MIN/1.73M2 — LOW
EGFR: 5 ML/MIN/1.73M2 — LOW
EGFR: 6 ML/MIN/1.73M2 — LOW
EGFR: 7 ML/MIN/1.73M2 — LOW
EGFR: 9 ML/MIN/1.73M2 — LOW
ELLIPTOCYTES BLD QL SMEAR: SLIGHT — SIGNIFICANT CHANGE UP
EOSINOPHIL # BLD AUTO: 0 K/UL — SIGNIFICANT CHANGE UP (ref 0–0.5)
EOSINOPHIL # BLD AUTO: 0 K/UL — SIGNIFICANT CHANGE UP (ref 0–0.5)
EOSINOPHIL # BLD AUTO: 0.03 K/UL — SIGNIFICANT CHANGE UP (ref 0–0.5)
EOSINOPHIL # BLD AUTO: 0.03 K/UL — SIGNIFICANT CHANGE UP (ref 0–0.5)
EOSINOPHIL # BLD AUTO: 0.06 K/UL — SIGNIFICANT CHANGE UP (ref 0–0.5)
EOSINOPHIL # BLD AUTO: 0.07 K/UL — SIGNIFICANT CHANGE UP (ref 0–0.5)
EOSINOPHIL # BLD AUTO: 0.08 K/UL — SIGNIFICANT CHANGE UP (ref 0–0.5)
EOSINOPHIL # BLD AUTO: 0.09 K/UL — SIGNIFICANT CHANGE UP (ref 0–0.5)
EOSINOPHIL # BLD AUTO: 0.12 K/UL — SIGNIFICANT CHANGE UP (ref 0–0.5)
EOSINOPHIL # BLD AUTO: 0.14 K/UL — SIGNIFICANT CHANGE UP (ref 0–0.5)
EOSINOPHIL # BLD AUTO: 0.14 K/UL — SIGNIFICANT CHANGE UP (ref 0–0.5)
EOSINOPHIL # BLD AUTO: 0.19 K/UL — SIGNIFICANT CHANGE UP (ref 0–0.5)
EOSINOPHIL NFR BLD AUTO: 0 % — SIGNIFICANT CHANGE UP (ref 0–6)
EOSINOPHIL NFR BLD AUTO: 0 % — SIGNIFICANT CHANGE UP (ref 0–6)
EOSINOPHIL NFR BLD AUTO: 0.8 % — SIGNIFICANT CHANGE UP (ref 0–6)
EOSINOPHIL NFR BLD AUTO: 0.8 % — SIGNIFICANT CHANGE UP (ref 0–6)
EOSINOPHIL NFR BLD AUTO: 1.6 % — SIGNIFICANT CHANGE UP (ref 0–6)
EOSINOPHIL NFR BLD AUTO: 1.7 % — SIGNIFICANT CHANGE UP (ref 0–6)
EOSINOPHIL NFR BLD AUTO: 2.1 % — SIGNIFICANT CHANGE UP (ref 0–6)
EOSINOPHIL NFR BLD AUTO: 2.5 % — SIGNIFICANT CHANGE UP (ref 0–6)
EOSINOPHIL NFR BLD AUTO: 2.6 % — SIGNIFICANT CHANGE UP (ref 0–6)
EOSINOPHIL NFR BLD AUTO: 2.6 % — SIGNIFICANT CHANGE UP (ref 0–6)
EOSINOPHIL NFR BLD AUTO: 2.7 % — SIGNIFICANT CHANGE UP (ref 0–6)
EOSINOPHIL NFR BLD AUTO: 3 % — SIGNIFICANT CHANGE UP (ref 0–6)
EOSINOPHIL NFR BLD AUTO: 3 % — SIGNIFICANT CHANGE UP (ref 0–6)
EOSINOPHIL NFR BLD AUTO: 3.1 % — SIGNIFICANT CHANGE UP (ref 0–6)
EOSINOPHIL NFR BLD AUTO: 3.3 % — SIGNIFICANT CHANGE UP (ref 0–6)
EOSINOPHIL NFR BLD AUTO: 4.4 % — SIGNIFICANT CHANGE UP (ref 0–6)
EOSINOPHIL NFR BLD AUTO: 6 % — SIGNIFICANT CHANGE UP (ref 0–6)
EOSINOPHIL NFR BLD AUTO: 6.1 % — HIGH (ref 0–6)
ESTIMATED AVERAGE GLUCOSE: 105 MG/DL — SIGNIFICANT CHANGE UP (ref 68–114)
ESTIMATED AVERAGE GLUCOSE: 105 MG/DL — SIGNIFICANT CHANGE UP (ref 68–114)
ESTIMATED AVERAGE GLUCOSE: 117 MG/DL — HIGH (ref 68–114)
ESTIMATED AVERAGE GLUCOSE: 128 MG/DL — HIGH (ref 68–114)
ESTIMATED AVERAGE GLUCOSE: 97 MG/DL — SIGNIFICANT CHANGE UP (ref 68–114)
FLUAV AG NPH QL: SIGNIFICANT CHANGE UP
FLUAV AG NPH QL: SIGNIFICANT CHANGE UP
FLUBV AG NPH QL: SIGNIFICANT CHANGE UP
FLUBV AG NPH QL: SIGNIFICANT CHANGE UP
GAS PNL BLDA: SIGNIFICANT CHANGE UP
GAS PNL BLDA: SIGNIFICANT CHANGE UP
GAS PNL BLDV: 140 MMOL/L — SIGNIFICANT CHANGE UP (ref 136–145)
GAS PNL BLDV: SIGNIFICANT CHANGE UP
GI PCR PANEL: SIGNIFICANT CHANGE UP
GI PCR PANEL: SIGNIFICANT CHANGE UP
GLUCOSE BLDC GLUCOMTR-MCNC: 100 MG/DL — HIGH (ref 70–99)
GLUCOSE BLDC GLUCOMTR-MCNC: 101 MG/DL — HIGH (ref 70–99)
GLUCOSE BLDC GLUCOMTR-MCNC: 102 MG/DL — HIGH (ref 70–99)
GLUCOSE BLDC GLUCOMTR-MCNC: 102 MG/DL — HIGH (ref 70–99)
GLUCOSE BLDC GLUCOMTR-MCNC: 103 MG/DL — HIGH (ref 70–99)
GLUCOSE BLDC GLUCOMTR-MCNC: 104 MG/DL — HIGH (ref 70–99)
GLUCOSE BLDC GLUCOMTR-MCNC: 105 MG/DL — HIGH (ref 70–99)
GLUCOSE BLDC GLUCOMTR-MCNC: 106 MG/DL — HIGH (ref 70–99)
GLUCOSE BLDC GLUCOMTR-MCNC: 107 MG/DL — HIGH (ref 70–99)
GLUCOSE BLDC GLUCOMTR-MCNC: 107 MG/DL — HIGH (ref 70–99)
GLUCOSE BLDC GLUCOMTR-MCNC: 108 MG/DL — HIGH (ref 70–99)
GLUCOSE BLDC GLUCOMTR-MCNC: 108 MG/DL — HIGH (ref 70–99)
GLUCOSE BLDC GLUCOMTR-MCNC: 109 MG/DL — HIGH (ref 70–99)
GLUCOSE BLDC GLUCOMTR-MCNC: 110 MG/DL — HIGH (ref 70–99)
GLUCOSE BLDC GLUCOMTR-MCNC: 111 MG/DL — HIGH (ref 70–99)
GLUCOSE BLDC GLUCOMTR-MCNC: 115 MG/DL — HIGH (ref 70–99)
GLUCOSE BLDC GLUCOMTR-MCNC: 117 MG/DL — HIGH (ref 70–99)
GLUCOSE BLDC GLUCOMTR-MCNC: 118 MG/DL — HIGH (ref 70–99)
GLUCOSE BLDC GLUCOMTR-MCNC: 119 MG/DL — HIGH (ref 70–99)
GLUCOSE BLDC GLUCOMTR-MCNC: 120 MG/DL — HIGH (ref 70–99)
GLUCOSE BLDC GLUCOMTR-MCNC: 122 MG/DL — HIGH (ref 70–99)
GLUCOSE BLDC GLUCOMTR-MCNC: 123 MG/DL — HIGH (ref 70–99)
GLUCOSE BLDC GLUCOMTR-MCNC: 124 MG/DL — HIGH (ref 70–99)
GLUCOSE BLDC GLUCOMTR-MCNC: 125 MG/DL — HIGH (ref 70–99)
GLUCOSE BLDC GLUCOMTR-MCNC: 126 MG/DL — HIGH (ref 70–99)
GLUCOSE BLDC GLUCOMTR-MCNC: 127 MG/DL — HIGH (ref 70–99)
GLUCOSE BLDC GLUCOMTR-MCNC: 128 MG/DL — HIGH (ref 70–99)
GLUCOSE BLDC GLUCOMTR-MCNC: 129 MG/DL — HIGH (ref 70–99)
GLUCOSE BLDC GLUCOMTR-MCNC: 129 MG/DL — HIGH (ref 70–99)
GLUCOSE BLDC GLUCOMTR-MCNC: 130 MG/DL — HIGH (ref 70–99)
GLUCOSE BLDC GLUCOMTR-MCNC: 130 MG/DL — HIGH (ref 70–99)
GLUCOSE BLDC GLUCOMTR-MCNC: 131 MG/DL — HIGH (ref 70–99)
GLUCOSE BLDC GLUCOMTR-MCNC: 132 MG/DL — HIGH (ref 70–99)
GLUCOSE BLDC GLUCOMTR-MCNC: 132 MG/DL — HIGH (ref 70–99)
GLUCOSE BLDC GLUCOMTR-MCNC: 133 MG/DL — HIGH (ref 70–99)
GLUCOSE BLDC GLUCOMTR-MCNC: 134 MG/DL — HIGH (ref 70–99)
GLUCOSE BLDC GLUCOMTR-MCNC: 134 MG/DL — HIGH (ref 70–99)
GLUCOSE BLDC GLUCOMTR-MCNC: 135 MG/DL — HIGH (ref 70–99)
GLUCOSE BLDC GLUCOMTR-MCNC: 135 MG/DL — HIGH (ref 70–99)
GLUCOSE BLDC GLUCOMTR-MCNC: 136 MG/DL — HIGH (ref 70–99)
GLUCOSE BLDC GLUCOMTR-MCNC: 136 MG/DL — HIGH (ref 70–99)
GLUCOSE BLDC GLUCOMTR-MCNC: 137 MG/DL — HIGH (ref 70–99)
GLUCOSE BLDC GLUCOMTR-MCNC: 138 MG/DL — HIGH (ref 70–99)
GLUCOSE BLDC GLUCOMTR-MCNC: 139 MG/DL — HIGH (ref 70–99)
GLUCOSE BLDC GLUCOMTR-MCNC: 140 MG/DL — HIGH (ref 70–99)
GLUCOSE BLDC GLUCOMTR-MCNC: 140 MG/DL — HIGH (ref 70–99)
GLUCOSE BLDC GLUCOMTR-MCNC: 143 MG/DL — HIGH (ref 70–99)
GLUCOSE BLDC GLUCOMTR-MCNC: 145 MG/DL — HIGH (ref 70–99)
GLUCOSE BLDC GLUCOMTR-MCNC: 147 MG/DL — HIGH (ref 70–99)
GLUCOSE BLDC GLUCOMTR-MCNC: 149 MG/DL — HIGH (ref 70–99)
GLUCOSE BLDC GLUCOMTR-MCNC: 150 MG/DL — HIGH (ref 70–99)
GLUCOSE BLDC GLUCOMTR-MCNC: 151 MG/DL — HIGH (ref 70–99)
GLUCOSE BLDC GLUCOMTR-MCNC: 151 MG/DL — HIGH (ref 70–99)
GLUCOSE BLDC GLUCOMTR-MCNC: 152 MG/DL — HIGH (ref 70–99)
GLUCOSE BLDC GLUCOMTR-MCNC: 153 MG/DL — HIGH (ref 70–99)
GLUCOSE BLDC GLUCOMTR-MCNC: 154 MG/DL — HIGH (ref 70–99)
GLUCOSE BLDC GLUCOMTR-MCNC: 154 MG/DL — HIGH (ref 70–99)
GLUCOSE BLDC GLUCOMTR-MCNC: 156 MG/DL — HIGH (ref 70–99)
GLUCOSE BLDC GLUCOMTR-MCNC: 156 MG/DL — HIGH (ref 70–99)
GLUCOSE BLDC GLUCOMTR-MCNC: 158 MG/DL — HIGH (ref 70–99)
GLUCOSE BLDC GLUCOMTR-MCNC: 158 MG/DL — HIGH (ref 70–99)
GLUCOSE BLDC GLUCOMTR-MCNC: 159 MG/DL — HIGH (ref 70–99)
GLUCOSE BLDC GLUCOMTR-MCNC: 161 MG/DL — HIGH (ref 70–99)
GLUCOSE BLDC GLUCOMTR-MCNC: 164 MG/DL — HIGH (ref 70–99)
GLUCOSE BLDC GLUCOMTR-MCNC: 167 MG/DL — HIGH (ref 70–99)
GLUCOSE BLDC GLUCOMTR-MCNC: 169 MG/DL — HIGH (ref 70–99)
GLUCOSE BLDC GLUCOMTR-MCNC: 169 MG/DL — HIGH (ref 70–99)
GLUCOSE BLDC GLUCOMTR-MCNC: 170 MG/DL — HIGH (ref 70–99)
GLUCOSE BLDC GLUCOMTR-MCNC: 170 MG/DL — HIGH (ref 70–99)
GLUCOSE BLDC GLUCOMTR-MCNC: 172 MG/DL — HIGH (ref 70–99)
GLUCOSE BLDC GLUCOMTR-MCNC: 173 MG/DL — HIGH (ref 70–99)
GLUCOSE BLDC GLUCOMTR-MCNC: 175 MG/DL — HIGH (ref 70–99)
GLUCOSE BLDC GLUCOMTR-MCNC: 175 MG/DL — HIGH (ref 70–99)
GLUCOSE BLDC GLUCOMTR-MCNC: 176 MG/DL — HIGH (ref 70–99)
GLUCOSE BLDC GLUCOMTR-MCNC: 177 MG/DL — HIGH (ref 70–99)
GLUCOSE BLDC GLUCOMTR-MCNC: 178 MG/DL — HIGH (ref 70–99)
GLUCOSE BLDC GLUCOMTR-MCNC: 179 MG/DL — HIGH (ref 70–99)
GLUCOSE BLDC GLUCOMTR-MCNC: 182 MG/DL — HIGH (ref 70–99)
GLUCOSE BLDC GLUCOMTR-MCNC: 182 MG/DL — HIGH (ref 70–99)
GLUCOSE BLDC GLUCOMTR-MCNC: 183 MG/DL — HIGH (ref 70–99)
GLUCOSE BLDC GLUCOMTR-MCNC: 183 MG/DL — HIGH (ref 70–99)
GLUCOSE BLDC GLUCOMTR-MCNC: 184 MG/DL — HIGH (ref 70–99)
GLUCOSE BLDC GLUCOMTR-MCNC: 185 MG/DL — HIGH (ref 70–99)
GLUCOSE BLDC GLUCOMTR-MCNC: 185 MG/DL — HIGH (ref 70–99)
GLUCOSE BLDC GLUCOMTR-MCNC: 188 MG/DL — HIGH (ref 70–99)
GLUCOSE BLDC GLUCOMTR-MCNC: 188 MG/DL — HIGH (ref 70–99)
GLUCOSE BLDC GLUCOMTR-MCNC: 189 MG/DL — HIGH (ref 70–99)
GLUCOSE BLDC GLUCOMTR-MCNC: 189 MG/DL — HIGH (ref 70–99)
GLUCOSE BLDC GLUCOMTR-MCNC: 194 MG/DL — HIGH (ref 70–99)
GLUCOSE BLDC GLUCOMTR-MCNC: 196 MG/DL — HIGH (ref 70–99)
GLUCOSE BLDC GLUCOMTR-MCNC: 197 MG/DL — HIGH (ref 70–99)
GLUCOSE BLDC GLUCOMTR-MCNC: 198 MG/DL — HIGH (ref 70–99)
GLUCOSE BLDC GLUCOMTR-MCNC: 201 MG/DL — HIGH (ref 70–99)
GLUCOSE BLDC GLUCOMTR-MCNC: 202 MG/DL — HIGH (ref 70–99)
GLUCOSE BLDC GLUCOMTR-MCNC: 204 MG/DL — HIGH (ref 70–99)
GLUCOSE BLDC GLUCOMTR-MCNC: 206 MG/DL — HIGH (ref 70–99)
GLUCOSE BLDC GLUCOMTR-MCNC: 207 MG/DL — HIGH (ref 70–99)
GLUCOSE BLDC GLUCOMTR-MCNC: 210 MG/DL — HIGH (ref 70–99)
GLUCOSE BLDC GLUCOMTR-MCNC: 210 MG/DL — HIGH (ref 70–99)
GLUCOSE BLDC GLUCOMTR-MCNC: 212 MG/DL — HIGH (ref 70–99)
GLUCOSE BLDC GLUCOMTR-MCNC: 214 MG/DL — HIGH (ref 70–99)
GLUCOSE BLDC GLUCOMTR-MCNC: 215 MG/DL — HIGH (ref 70–99)
GLUCOSE BLDC GLUCOMTR-MCNC: 216 MG/DL — HIGH (ref 70–99)
GLUCOSE BLDC GLUCOMTR-MCNC: 217 MG/DL — HIGH (ref 70–99)
GLUCOSE BLDC GLUCOMTR-MCNC: 220 MG/DL — HIGH (ref 70–99)
GLUCOSE BLDC GLUCOMTR-MCNC: 224 MG/DL — HIGH (ref 70–99)
GLUCOSE BLDC GLUCOMTR-MCNC: 225 MG/DL — HIGH (ref 70–99)
GLUCOSE BLDC GLUCOMTR-MCNC: 229 MG/DL — HIGH (ref 70–99)
GLUCOSE BLDC GLUCOMTR-MCNC: 230 MG/DL — HIGH (ref 70–99)
GLUCOSE BLDC GLUCOMTR-MCNC: 233 MG/DL — HIGH (ref 70–99)
GLUCOSE BLDC GLUCOMTR-MCNC: 236 MG/DL — HIGH (ref 70–99)
GLUCOSE BLDC GLUCOMTR-MCNC: 237 MG/DL — HIGH (ref 70–99)
GLUCOSE BLDC GLUCOMTR-MCNC: 237 MG/DL — HIGH (ref 70–99)
GLUCOSE BLDC GLUCOMTR-MCNC: 243 MG/DL — HIGH (ref 70–99)
GLUCOSE BLDC GLUCOMTR-MCNC: 246 MG/DL — HIGH (ref 70–99)
GLUCOSE BLDC GLUCOMTR-MCNC: 250 MG/DL — HIGH (ref 70–99)
GLUCOSE BLDC GLUCOMTR-MCNC: 251 MG/DL — HIGH (ref 70–99)
GLUCOSE BLDC GLUCOMTR-MCNC: 252 MG/DL — HIGH (ref 70–99)
GLUCOSE BLDC GLUCOMTR-MCNC: 262 MG/DL — HIGH (ref 70–99)
GLUCOSE BLDC GLUCOMTR-MCNC: 263 MG/DL — HIGH (ref 70–99)
GLUCOSE BLDC GLUCOMTR-MCNC: 263 MG/DL — HIGH (ref 70–99)
GLUCOSE BLDC GLUCOMTR-MCNC: 265 MG/DL — HIGH (ref 70–99)
GLUCOSE BLDC GLUCOMTR-MCNC: 266 MG/DL — HIGH (ref 70–99)
GLUCOSE BLDC GLUCOMTR-MCNC: 274 MG/DL — HIGH (ref 70–99)
GLUCOSE BLDC GLUCOMTR-MCNC: 274 MG/DL — HIGH (ref 70–99)
GLUCOSE BLDC GLUCOMTR-MCNC: 277 MG/DL — HIGH (ref 70–99)
GLUCOSE BLDC GLUCOMTR-MCNC: 278 MG/DL — HIGH (ref 70–99)
GLUCOSE BLDC GLUCOMTR-MCNC: 279 MG/DL — HIGH (ref 70–99)
GLUCOSE BLDC GLUCOMTR-MCNC: 284 MG/DL — HIGH (ref 70–99)
GLUCOSE BLDC GLUCOMTR-MCNC: 292 MG/DL — HIGH (ref 70–99)
GLUCOSE BLDC GLUCOMTR-MCNC: 292 MG/DL — HIGH (ref 70–99)
GLUCOSE BLDC GLUCOMTR-MCNC: 297 MG/DL — HIGH (ref 70–99)
GLUCOSE BLDC GLUCOMTR-MCNC: 309 MG/DL — HIGH (ref 70–99)
GLUCOSE BLDC GLUCOMTR-MCNC: 309 MG/DL — HIGH (ref 70–99)
GLUCOSE BLDC GLUCOMTR-MCNC: 310 MG/DL — HIGH (ref 70–99)
GLUCOSE BLDC GLUCOMTR-MCNC: 330 MG/DL — HIGH (ref 70–99)
GLUCOSE BLDC GLUCOMTR-MCNC: 338 MG/DL — HIGH (ref 70–99)
GLUCOSE BLDC GLUCOMTR-MCNC: 338 MG/DL — HIGH (ref 70–99)
GLUCOSE BLDC GLUCOMTR-MCNC: 339 MG/DL — HIGH (ref 70–99)
GLUCOSE BLDC GLUCOMTR-MCNC: 34 MG/DL — CRITICAL LOW (ref 70–99)
GLUCOSE BLDC GLUCOMTR-MCNC: 35 MG/DL — CRITICAL LOW (ref 70–99)
GLUCOSE BLDC GLUCOMTR-MCNC: 35 MG/DL — CRITICAL LOW (ref 70–99)
GLUCOSE BLDC GLUCOMTR-MCNC: 36 MG/DL — CRITICAL LOW (ref 70–99)
GLUCOSE BLDC GLUCOMTR-MCNC: 395 MG/DL — HIGH (ref 70–99)
GLUCOSE BLDC GLUCOMTR-MCNC: 42 MG/DL — CRITICAL LOW (ref 70–99)
GLUCOSE BLDC GLUCOMTR-MCNC: 42 MG/DL — CRITICAL LOW (ref 70–99)
GLUCOSE BLDC GLUCOMTR-MCNC: 58 MG/DL — LOW (ref 70–99)
GLUCOSE BLDC GLUCOMTR-MCNC: 61 MG/DL — LOW (ref 70–99)
GLUCOSE BLDC GLUCOMTR-MCNC: 62 MG/DL — LOW (ref 70–99)
GLUCOSE BLDC GLUCOMTR-MCNC: 64 MG/DL — LOW (ref 70–99)
GLUCOSE BLDC GLUCOMTR-MCNC: 65 MG/DL — LOW (ref 70–99)
GLUCOSE BLDC GLUCOMTR-MCNC: 66 MG/DL — LOW (ref 70–99)
GLUCOSE BLDC GLUCOMTR-MCNC: 68 MG/DL — LOW (ref 70–99)
GLUCOSE BLDC GLUCOMTR-MCNC: 70 MG/DL — SIGNIFICANT CHANGE UP (ref 70–99)
GLUCOSE BLDC GLUCOMTR-MCNC: 71 MG/DL — SIGNIFICANT CHANGE UP (ref 70–99)
GLUCOSE BLDC GLUCOMTR-MCNC: 72 MG/DL — SIGNIFICANT CHANGE UP (ref 70–99)
GLUCOSE BLDC GLUCOMTR-MCNC: 75 MG/DL — SIGNIFICANT CHANGE UP (ref 70–99)
GLUCOSE BLDC GLUCOMTR-MCNC: 76 MG/DL — SIGNIFICANT CHANGE UP (ref 70–99)
GLUCOSE BLDC GLUCOMTR-MCNC: 76 MG/DL — SIGNIFICANT CHANGE UP (ref 70–99)
GLUCOSE BLDC GLUCOMTR-MCNC: 77 MG/DL — SIGNIFICANT CHANGE UP (ref 70–99)
GLUCOSE BLDC GLUCOMTR-MCNC: 78 MG/DL — SIGNIFICANT CHANGE UP (ref 70–99)
GLUCOSE BLDC GLUCOMTR-MCNC: 79 MG/DL — SIGNIFICANT CHANGE UP (ref 70–99)
GLUCOSE BLDC GLUCOMTR-MCNC: 80 MG/DL — SIGNIFICANT CHANGE UP (ref 70–99)
GLUCOSE BLDC GLUCOMTR-MCNC: 81 MG/DL — SIGNIFICANT CHANGE UP (ref 70–99)
GLUCOSE BLDC GLUCOMTR-MCNC: 81 MG/DL — SIGNIFICANT CHANGE UP (ref 70–99)
GLUCOSE BLDC GLUCOMTR-MCNC: 82 MG/DL — SIGNIFICANT CHANGE UP (ref 70–99)
GLUCOSE BLDC GLUCOMTR-MCNC: 84 MG/DL — SIGNIFICANT CHANGE UP (ref 70–99)
GLUCOSE BLDC GLUCOMTR-MCNC: 86 MG/DL — SIGNIFICANT CHANGE UP (ref 70–99)
GLUCOSE BLDC GLUCOMTR-MCNC: 88 MG/DL — SIGNIFICANT CHANGE UP (ref 70–99)
GLUCOSE BLDC GLUCOMTR-MCNC: 88 MG/DL — SIGNIFICANT CHANGE UP (ref 70–99)
GLUCOSE BLDC GLUCOMTR-MCNC: 89 MG/DL — SIGNIFICANT CHANGE UP (ref 70–99)
GLUCOSE BLDC GLUCOMTR-MCNC: 90 MG/DL — SIGNIFICANT CHANGE UP (ref 70–99)
GLUCOSE BLDC GLUCOMTR-MCNC: 90 MG/DL — SIGNIFICANT CHANGE UP (ref 70–99)
GLUCOSE BLDC GLUCOMTR-MCNC: 91 MG/DL — SIGNIFICANT CHANGE UP (ref 70–99)
GLUCOSE BLDC GLUCOMTR-MCNC: 92 MG/DL — SIGNIFICANT CHANGE UP (ref 70–99)
GLUCOSE BLDC GLUCOMTR-MCNC: 93 MG/DL — SIGNIFICANT CHANGE UP (ref 70–99)
GLUCOSE BLDC GLUCOMTR-MCNC: 94 MG/DL — SIGNIFICANT CHANGE UP (ref 70–99)
GLUCOSE BLDC GLUCOMTR-MCNC: 94 MG/DL — SIGNIFICANT CHANGE UP (ref 70–99)
GLUCOSE BLDC GLUCOMTR-MCNC: 95 MG/DL — SIGNIFICANT CHANGE UP (ref 70–99)
GLUCOSE BLDC GLUCOMTR-MCNC: 96 MG/DL — SIGNIFICANT CHANGE UP (ref 70–99)
GLUCOSE BLDC GLUCOMTR-MCNC: 96 MG/DL — SIGNIFICANT CHANGE UP (ref 70–99)
GLUCOSE BLDC GLUCOMTR-MCNC: 97 MG/DL — SIGNIFICANT CHANGE UP (ref 70–99)
GLUCOSE BLDC GLUCOMTR-MCNC: 98 MG/DL — SIGNIFICANT CHANGE UP (ref 70–99)
GLUCOSE BLDC GLUCOMTR-MCNC: 99 MG/DL — SIGNIFICANT CHANGE UP (ref 70–99)
GLUCOSE SERPL-MCNC: 101 MG/DL — HIGH (ref 70–99)
GLUCOSE SERPL-MCNC: 102 MG/DL — HIGH (ref 70–99)
GLUCOSE SERPL-MCNC: 103 MG/DL — HIGH (ref 70–99)
GLUCOSE SERPL-MCNC: 104 MG/DL — HIGH (ref 70–99)
GLUCOSE SERPL-MCNC: 105 MG/DL — HIGH (ref 70–99)
GLUCOSE SERPL-MCNC: 109 MG/DL — HIGH (ref 70–99)
GLUCOSE SERPL-MCNC: 112 MG/DL — HIGH (ref 70–99)
GLUCOSE SERPL-MCNC: 114 MG/DL — HIGH (ref 70–99)
GLUCOSE SERPL-MCNC: 119 MG/DL — HIGH (ref 70–99)
GLUCOSE SERPL-MCNC: 123 MG/DL — HIGH (ref 70–99)
GLUCOSE SERPL-MCNC: 125 MG/DL — HIGH (ref 70–99)
GLUCOSE SERPL-MCNC: 128 MG/DL — HIGH (ref 70–99)
GLUCOSE SERPL-MCNC: 130 MG/DL — HIGH (ref 70–99)
GLUCOSE SERPL-MCNC: 131 MG/DL — HIGH (ref 70–99)
GLUCOSE SERPL-MCNC: 144 MG/DL — HIGH (ref 70–99)
GLUCOSE SERPL-MCNC: 145 MG/DL — HIGH (ref 70–99)
GLUCOSE SERPL-MCNC: 145 MG/DL — HIGH (ref 70–99)
GLUCOSE SERPL-MCNC: 146 MG/DL — HIGH (ref 70–99)
GLUCOSE SERPL-MCNC: 153 MG/DL — HIGH (ref 70–99)
GLUCOSE SERPL-MCNC: 157 MG/DL — HIGH (ref 70–99)
GLUCOSE SERPL-MCNC: 157 MG/DL — HIGH (ref 70–99)
GLUCOSE SERPL-MCNC: 159 MG/DL — HIGH (ref 70–99)
GLUCOSE SERPL-MCNC: 160 MG/DL — HIGH (ref 70–99)
GLUCOSE SERPL-MCNC: 182 MG/DL — HIGH (ref 70–99)
GLUCOSE SERPL-MCNC: 185 MG/DL — HIGH (ref 70–99)
GLUCOSE SERPL-MCNC: 189 MG/DL — HIGH (ref 70–99)
GLUCOSE SERPL-MCNC: 191 MG/DL — HIGH (ref 70–99)
GLUCOSE SERPL-MCNC: 193 MG/DL — HIGH (ref 70–99)
GLUCOSE SERPL-MCNC: 200 MG/DL — HIGH (ref 70–99)
GLUCOSE SERPL-MCNC: 211 MG/DL — HIGH (ref 70–99)
GLUCOSE SERPL-MCNC: 251 MG/DL — HIGH (ref 70–99)
GLUCOSE SERPL-MCNC: 268 MG/DL — HIGH (ref 70–99)
GLUCOSE SERPL-MCNC: 276 MG/DL — HIGH (ref 70–99)
GLUCOSE SERPL-MCNC: 276 MG/DL — HIGH (ref 70–99)
GLUCOSE SERPL-MCNC: 308 MG/DL — HIGH (ref 70–99)
GLUCOSE SERPL-MCNC: 328 MG/DL — HIGH (ref 70–99)
GLUCOSE SERPL-MCNC: 345 MG/DL — HIGH (ref 70–99)
GLUCOSE SERPL-MCNC: 350 MG/DL — HIGH (ref 70–99)
GLUCOSE SERPL-MCNC: 42 MG/DL — CRITICAL LOW (ref 70–99)
GLUCOSE SERPL-MCNC: 423 MG/DL — HIGH (ref 70–99)
GLUCOSE SERPL-MCNC: 423 MG/DL — HIGH (ref 70–99)
GLUCOSE SERPL-MCNC: 53 MG/DL — CRITICAL LOW (ref 70–99)
GLUCOSE SERPL-MCNC: 53 MG/DL — CRITICAL LOW (ref 70–99)
GLUCOSE SERPL-MCNC: 64 MG/DL — LOW (ref 70–99)
GLUCOSE SERPL-MCNC: 73 MG/DL — SIGNIFICANT CHANGE UP (ref 70–99)
GLUCOSE SERPL-MCNC: 73 MG/DL — SIGNIFICANT CHANGE UP (ref 70–99)
GLUCOSE SERPL-MCNC: 77 MG/DL — SIGNIFICANT CHANGE UP (ref 70–99)
GLUCOSE SERPL-MCNC: 81 MG/DL — SIGNIFICANT CHANGE UP (ref 70–99)
GLUCOSE SERPL-MCNC: 86 MG/DL — SIGNIFICANT CHANGE UP (ref 70–99)
GLUCOSE SERPL-MCNC: 88 MG/DL — SIGNIFICANT CHANGE UP (ref 70–99)
GLUCOSE SERPL-MCNC: 89 MG/DL — SIGNIFICANT CHANGE UP (ref 70–99)
GLUCOSE SERPL-MCNC: 89 MG/DL — SIGNIFICANT CHANGE UP (ref 70–99)
GLUCOSE SERPL-MCNC: 92 MG/DL — SIGNIFICANT CHANGE UP (ref 70–99)
GLUCOSE SERPL-MCNC: 92 MG/DL — SIGNIFICANT CHANGE UP (ref 70–99)
GLUCOSE SERPL-MCNC: 94 MG/DL — SIGNIFICANT CHANGE UP (ref 70–99)
GLUCOSE SERPL-MCNC: 99 MG/DL — SIGNIFICANT CHANGE UP (ref 70–99)
HAV IGM SER-ACNC: SIGNIFICANT CHANGE UP
HBV CORE IGM SER-ACNC: SIGNIFICANT CHANGE UP
HBV SURFACE AB SER-ACNC: ABNORMAL
HBV SURFACE AB SER-ACNC: SIGNIFICANT CHANGE UP
HBV SURFACE AG SER-ACNC: SIGNIFICANT CHANGE UP
HCG SERPL-ACNC: <1 MIU/ML — SIGNIFICANT CHANGE UP
HCO3 BLDV-SCNC: 25 MMOL/L — SIGNIFICANT CHANGE UP (ref 22–29)
HCT VFR BLD CALC: 17.3 % — CRITICAL LOW (ref 39–50)
HCT VFR BLD CALC: 20.5 % — CRITICAL LOW (ref 39–50)
HCT VFR BLD CALC: 20.7 % — CRITICAL LOW (ref 39–50)
HCT VFR BLD CALC: 21.9 % — LOW (ref 39–50)
HCT VFR BLD CALC: 22.4 % — LOW (ref 39–50)
HCT VFR BLD CALC: 22.6 % — LOW (ref 39–50)
HCT VFR BLD CALC: 23 % — LOW (ref 39–50)
HCT VFR BLD CALC: 23.4 % — LOW (ref 39–50)
HCT VFR BLD CALC: 24.1 % — LOW (ref 39–50)
HCT VFR BLD CALC: 24.1 % — LOW (ref 39–50)
HCT VFR BLD CALC: 24.5 % — LOW (ref 39–50)
HCT VFR BLD CALC: 24.5 % — LOW (ref 39–50)
HCT VFR BLD CALC: 24.7 % — LOW (ref 39–50)
HCT VFR BLD CALC: 24.7 % — LOW (ref 39–50)
HCT VFR BLD CALC: 25.1 % — LOW (ref 39–50)
HCT VFR BLD CALC: 25.1 % — LOW (ref 39–50)
HCT VFR BLD CALC: 25.3 % — LOW (ref 39–50)
HCT VFR BLD CALC: 25.3 % — LOW (ref 39–50)
HCT VFR BLD CALC: 25.4 % — LOW (ref 39–50)
HCT VFR BLD CALC: 25.5 % — LOW (ref 39–50)
HCT VFR BLD CALC: 26.1 % — LOW (ref 39–50)
HCT VFR BLD CALC: 26.4 % — LOW (ref 39–50)
HCT VFR BLD CALC: 26.6 % — LOW (ref 39–50)
HCT VFR BLD CALC: 26.6 % — LOW (ref 39–50)
HCT VFR BLD CALC: 26.9 % — LOW (ref 39–50)
HCT VFR BLD CALC: 27 % — LOW (ref 39–50)
HCT VFR BLD CALC: 27.1 % — LOW (ref 39–50)
HCT VFR BLD CALC: 27.2 % — LOW (ref 39–50)
HCT VFR BLD CALC: 27.4 % — LOW (ref 39–50)
HCT VFR BLD CALC: 27.5 % — LOW (ref 39–50)
HCT VFR BLD CALC: 28.1 % — LOW (ref 39–50)
HCT VFR BLD CALC: 28.2 % — LOW (ref 39–50)
HCT VFR BLD CALC: 28.5 % — LOW (ref 39–50)
HCT VFR BLD CALC: 28.6 % — LOW (ref 39–50)
HCT VFR BLD CALC: 28.6 % — LOW (ref 39–50)
HCT VFR BLD CALC: 29.1 % — LOW (ref 39–50)
HCT VFR BLD CALC: 29.1 % — LOW (ref 39–50)
HCT VFR BLD CALC: 30.1 % — LOW (ref 39–50)
HCT VFR BLD CALC: 30.1 % — LOW (ref 39–50)
HCT VFR BLD CALC: 30.5 % — LOW (ref 39–50)
HCT VFR BLD CALC: 31 % — LOW (ref 39–50)
HCT VFR BLD CALC: 31.1 % — LOW (ref 39–50)
HCT VFR BLD CALC: 31.6 % — LOW (ref 39–50)
HCT VFR BLD CALC: 31.9 % — LOW (ref 39–50)
HCT VFR BLD CALC: 32 % — LOW (ref 39–50)
HCT VFR BLD CALC: 32.2 % — LOW (ref 39–50)
HCT VFR BLD CALC: 33 % — LOW (ref 39–50)
HCT VFR BLD CALC: 34.1 % — LOW (ref 39–50)
HCT VFR BLD CALC: 36.5 % — LOW (ref 39–50)
HCT VFR BLD CALC: 36.5 % — LOW (ref 39–50)
HCV AB S/CO SERPL IA: 0.08 S/CO — SIGNIFICANT CHANGE UP (ref 0–0.99)
HCV AB S/CO SERPL IA: 0.09 S/CO — SIGNIFICANT CHANGE UP (ref 0–0.99)
HCV AB S/CO SERPL IA: 0.09 S/CO — SIGNIFICANT CHANGE UP (ref 0–0.99)
HCV AB SERPL-IMP: SIGNIFICANT CHANGE UP
HGB BLD-MCNC: 10.1 G/DL — LOW (ref 13–17)
HGB BLD-MCNC: 10.2 G/DL — LOW (ref 13–17)
HGB BLD-MCNC: 10.5 G/DL — LOW (ref 13–17)
HGB BLD-MCNC: 11.7 G/DL — LOW (ref 13–17)
HGB BLD-MCNC: 11.7 G/DL — LOW (ref 13–17)
HGB BLD-MCNC: 5.1 G/DL — CRITICAL LOW (ref 13–17)
HGB BLD-MCNC: 6.2 G/DL — CRITICAL LOW (ref 13–17)
HGB BLD-MCNC: 6.5 G/DL — CRITICAL LOW (ref 13–17)
HGB BLD-MCNC: 6.8 G/DL — CRITICAL LOW (ref 13–17)
HGB BLD-MCNC: 7 G/DL — CRITICAL LOW (ref 13–17)
HGB BLD-MCNC: 7.1 G/DL — LOW (ref 13–17)
HGB BLD-MCNC: 7.1 G/DL — LOW (ref 13–17)
HGB BLD-MCNC: 7.2 G/DL — LOW (ref 13–17)
HGB BLD-MCNC: 7.2 G/DL — LOW (ref 13–17)
HGB BLD-MCNC: 7.3 G/DL — LOW (ref 13–17)
HGB BLD-MCNC: 7.5 G/DL — LOW (ref 13–17)
HGB BLD-MCNC: 7.5 G/DL — LOW (ref 13–17)
HGB BLD-MCNC: 7.6 G/DL — LOW (ref 13–17)
HGB BLD-MCNC: 7.7 G/DL — LOW (ref 13–17)
HGB BLD-MCNC: 7.7 G/DL — LOW (ref 13–17)
HGB BLD-MCNC: 7.8 G/DL — LOW (ref 13–17)
HGB BLD-MCNC: 8 G/DL — LOW (ref 13–17)
HGB BLD-MCNC: 8.2 G/DL — LOW (ref 13–17)
HGB BLD-MCNC: 8.3 G/DL — LOW (ref 13–17)
HGB BLD-MCNC: 8.4 G/DL — LOW (ref 13–17)
HGB BLD-MCNC: 8.4 G/DL — LOW (ref 13–17)
HGB BLD-MCNC: 8.5 G/DL — LOW (ref 13–17)
HGB BLD-MCNC: 8.6 G/DL — LOW (ref 13–17)
HGB BLD-MCNC: 8.6 G/DL — LOW (ref 13–17)
HGB BLD-MCNC: 8.7 G/DL — LOW (ref 13–17)
HGB BLD-MCNC: 8.8 G/DL — LOW (ref 13–17)
HGB BLD-MCNC: 8.9 G/DL — LOW (ref 13–17)
HGB BLD-MCNC: 8.9 G/DL — LOW (ref 13–17)
HGB BLD-MCNC: 9.2 G/DL — LOW (ref 13–17)
HGB BLD-MCNC: 9.3 G/DL — LOW (ref 13–17)
HGB BLD-MCNC: 9.4 G/DL — LOW (ref 13–17)
HGB BLD-MCNC: 9.4 G/DL — LOW (ref 13–17)
HGB BLD-MCNC: 9.7 G/DL — LOW (ref 13–17)
HGB BLD-MCNC: 9.8 G/DL — LOW (ref 13–17)
HGB BLD-MCNC: 9.8 G/DL — LOW (ref 13–17)
HGB BLD-MCNC: 9.9 G/DL — LOW (ref 13–17)
HGB BLD-MCNC: 9.9 G/DL — LOW (ref 13–17)
HYPOCHROMIA BLD QL: SLIGHT — SIGNIFICANT CHANGE UP
IMM GRANULOCYTES NFR BLD AUTO: 0 % — SIGNIFICANT CHANGE UP (ref 0–0.9)
IMM GRANULOCYTES NFR BLD AUTO: 0.2 % — SIGNIFICANT CHANGE UP (ref 0–0.9)
IMM GRANULOCYTES NFR BLD AUTO: 0.3 % — SIGNIFICANT CHANGE UP (ref 0–0.9)
IMM GRANULOCYTES NFR BLD AUTO: 0.4 % — SIGNIFICANT CHANGE UP (ref 0–0.9)
IMM GRANULOCYTES NFR BLD AUTO: 0.6 % — SIGNIFICANT CHANGE UP (ref 0–0.9)
IMM GRANULOCYTES NFR BLD AUTO: 0.6 % — SIGNIFICANT CHANGE UP (ref 0–0.9)
IMM GRANULOCYTES NFR BLD AUTO: 0.8 % — SIGNIFICANT CHANGE UP (ref 0–0.9)
INR BLD: 0.94 RATIO — SIGNIFICANT CHANGE UP (ref 0.85–1.18)
INR BLD: 0.94 RATIO — SIGNIFICANT CHANGE UP (ref 0.85–1.18)
INR BLD: 1.02 RATIO — SIGNIFICANT CHANGE UP (ref 0.85–1.18)
INR BLD: 1.02 RATIO — SIGNIFICANT CHANGE UP (ref 0.85–1.18)
INR BLD: 1.02 RATIO — SIGNIFICANT CHANGE UP (ref 0.88–1.16)
INR BLD: 1.03 RATIO — SIGNIFICANT CHANGE UP (ref 0.88–1.16)
LACTATE BLDV-MCNC: 1 MMOL/L — SIGNIFICANT CHANGE UP (ref 0.5–2)
LACTATE SERPL-SCNC: 0.6 MMOL/L — LOW (ref 0.7–2)
LACTATE SERPL-SCNC: 0.7 MMOL/L — SIGNIFICANT CHANGE UP (ref 0.7–2)
LACTATE SERPL-SCNC: 0.7 MMOL/L — SIGNIFICANT CHANGE UP (ref 0.7–2)
LACTATE SERPL-SCNC: 1.5 MMOL/L — SIGNIFICANT CHANGE UP (ref 0.7–2)
LACTATE SERPL-SCNC: 1.5 MMOL/L — SIGNIFICANT CHANGE UP (ref 0.7–2)
LACTATE SERPL-SCNC: 2.6 MMOL/L — HIGH (ref 0.7–2)
LACTATE SERPL-SCNC: 2.6 MMOL/L — HIGH (ref 0.7–2)
LG PLATELETS BLD QL AUTO: SLIGHT — SIGNIFICANT CHANGE UP
LIDOCAIN IGE QN: 42 U/L — LOW (ref 73–393)
LIDOCAIN IGE QN: 42 U/L — LOW (ref 73–393)
LIDOCAIN IGE QN: 47 U/L — SIGNIFICANT CHANGE UP (ref 13–75)
LIDOCAIN IGE QN: 47 U/L — SIGNIFICANT CHANGE UP (ref 13–75)
LIDOCAIN IGE QN: 7 U/L — LOW (ref 13–75)
LIDOCAIN IGE QN: 7 U/L — LOW (ref 13–75)
LYMPHOCYTES # BLD AUTO: 0.09 K/UL — LOW (ref 1–3.3)
LYMPHOCYTES # BLD AUTO: 0.09 K/UL — LOW (ref 1–3.3)
LYMPHOCYTES # BLD AUTO: 0.13 K/UL — LOW (ref 1–3.3)
LYMPHOCYTES # BLD AUTO: 0.19 K/UL — LOW (ref 1–3.3)
LYMPHOCYTES # BLD AUTO: 0.2 K/UL — LOW (ref 1–3.3)
LYMPHOCYTES # BLD AUTO: 0.23 K/UL — LOW (ref 1–3.3)
LYMPHOCYTES # BLD AUTO: 0.25 K/UL — LOW (ref 1–3.3)
LYMPHOCYTES # BLD AUTO: 0.27 K/UL — LOW (ref 1–3.3)
LYMPHOCYTES # BLD AUTO: 0.28 K/UL — LOW (ref 1–3.3)
LYMPHOCYTES # BLD AUTO: 0.3 K/UL — LOW (ref 1–3.3)
LYMPHOCYTES # BLD AUTO: 0.34 K/UL — LOW (ref 1–3.3)
LYMPHOCYTES # BLD AUTO: 0.34 K/UL — LOW (ref 1–3.3)
LYMPHOCYTES # BLD AUTO: 0.35 K/UL — LOW (ref 1–3.3)
LYMPHOCYTES # BLD AUTO: 0.43 K/UL — LOW (ref 1–3.3)
LYMPHOCYTES # BLD AUTO: 0.43 K/UL — LOW (ref 1–3.3)
LYMPHOCYTES # BLD AUTO: 0.47 K/UL — LOW (ref 1–3.3)
LYMPHOCYTES # BLD AUTO: 11.6 % — LOW (ref 13–44)
LYMPHOCYTES # BLD AUTO: 11.7 % — LOW (ref 13–44)
LYMPHOCYTES # BLD AUTO: 12 % — LOW (ref 13–44)
LYMPHOCYTES # BLD AUTO: 12 % — LOW (ref 13–44)
LYMPHOCYTES # BLD AUTO: 12.1 % — LOW (ref 13–44)
LYMPHOCYTES # BLD AUTO: 14 % — SIGNIFICANT CHANGE UP (ref 13–44)
LYMPHOCYTES # BLD AUTO: 14 % — SIGNIFICANT CHANGE UP (ref 13–44)
LYMPHOCYTES # BLD AUTO: 2.5 % — LOW (ref 13–44)
LYMPHOCYTES # BLD AUTO: 2.5 % — LOW (ref 13–44)
LYMPHOCYTES # BLD AUTO: 4 % — LOW (ref 13–44)
LYMPHOCYTES # BLD AUTO: 4.1 % — LOW (ref 13–44)
LYMPHOCYTES # BLD AUTO: 5.1 % — LOW (ref 13–44)
LYMPHOCYTES # BLD AUTO: 8.1 % — LOW (ref 13–44)
LYMPHOCYTES # BLD AUTO: 8.3 % — LOW (ref 13–44)
LYMPHOCYTES # BLD AUTO: 8.9 % — LOW (ref 13–44)
LYMPHOCYTES # BLD AUTO: 8.9 % — LOW (ref 13–44)
LYMPHOCYTES # BLD AUTO: 9.3 % — LOW (ref 13–44)
LYMPHOCYTES # BLD AUTO: 9.9 % — LOW (ref 13–44)
MACROCYTES BLD QL: SLIGHT — SIGNIFICANT CHANGE UP
MAGNESIUM SERPL-MCNC: 2.3 MG/DL — SIGNIFICANT CHANGE UP (ref 1.6–2.6)
MAGNESIUM SERPL-MCNC: 2.4 MG/DL — SIGNIFICANT CHANGE UP (ref 1.6–2.6)
MAGNESIUM SERPL-MCNC: 2.6 MG/DL — SIGNIFICANT CHANGE UP (ref 1.6–2.6)
MAGNESIUM SERPL-MCNC: 2.7 MG/DL — HIGH (ref 1.6–2.6)
MAGNESIUM SERPL-MCNC: 2.7 MG/DL — HIGH (ref 1.6–2.6)
MAGNESIUM SERPL-MCNC: 2.8 MG/DL — HIGH (ref 1.6–2.6)
MAGNESIUM SERPL-MCNC: 2.9 MG/DL — HIGH (ref 1.6–2.6)
MAGNESIUM SERPL-MCNC: 3 MG/DL — HIGH (ref 1.6–2.6)
MAGNESIUM SERPL-MCNC: 3 MG/DL — HIGH (ref 1.6–2.6)
MAGNESIUM SERPL-MCNC: 3.1 MG/DL — HIGH (ref 1.6–2.6)
MAGNESIUM SERPL-MCNC: 3.2 MG/DL — HIGH (ref 1.6–2.6)
MAGNESIUM SERPL-MCNC: 3.3 MG/DL — HIGH (ref 1.6–2.6)
MAGNESIUM SERPL-MCNC: 3.4 MG/DL — HIGH (ref 1.6–2.6)
MANUAL SMEAR VERIFICATION: SIGNIFICANT CHANGE UP
MCHC RBC-ENTMCNC: 26.5 PG — LOW (ref 27–34)
MCHC RBC-ENTMCNC: 26.7 PG — LOW (ref 27–34)
MCHC RBC-ENTMCNC: 26.8 PG — LOW (ref 27–34)
MCHC RBC-ENTMCNC: 27.1 PG — SIGNIFICANT CHANGE UP (ref 27–34)
MCHC RBC-ENTMCNC: 27.2 PG — SIGNIFICANT CHANGE UP (ref 27–34)
MCHC RBC-ENTMCNC: 27.3 PG — SIGNIFICANT CHANGE UP (ref 27–34)
MCHC RBC-ENTMCNC: 27.3 PG — SIGNIFICANT CHANGE UP (ref 27–34)
MCHC RBC-ENTMCNC: 27.4 PG — SIGNIFICANT CHANGE UP (ref 27–34)
MCHC RBC-ENTMCNC: 27.5 PG — SIGNIFICANT CHANGE UP (ref 27–34)
MCHC RBC-ENTMCNC: 27.6 PG — SIGNIFICANT CHANGE UP (ref 27–34)
MCHC RBC-ENTMCNC: 27.7 PG — SIGNIFICANT CHANGE UP (ref 27–34)
MCHC RBC-ENTMCNC: 27.7 PG — SIGNIFICANT CHANGE UP (ref 27–34)
MCHC RBC-ENTMCNC: 27.8 PG — SIGNIFICANT CHANGE UP (ref 27–34)
MCHC RBC-ENTMCNC: 28 PG — SIGNIFICANT CHANGE UP (ref 27–34)
MCHC RBC-ENTMCNC: 28.2 PG — SIGNIFICANT CHANGE UP (ref 27–34)
MCHC RBC-ENTMCNC: 28.5 PG — SIGNIFICANT CHANGE UP (ref 27–34)
MCHC RBC-ENTMCNC: 28.5 PG — SIGNIFICANT CHANGE UP (ref 27–34)
MCHC RBC-ENTMCNC: 29 PG — SIGNIFICANT CHANGE UP (ref 27–34)
MCHC RBC-ENTMCNC: 29.1 PG — SIGNIFICANT CHANGE UP (ref 27–34)
MCHC RBC-ENTMCNC: 29.2 PG — SIGNIFICANT CHANGE UP (ref 27–34)
MCHC RBC-ENTMCNC: 29.3 PG — SIGNIFICANT CHANGE UP (ref 27–34)
MCHC RBC-ENTMCNC: 29.3 PG — SIGNIFICANT CHANGE UP (ref 27–34)
MCHC RBC-ENTMCNC: 29.4 PG — SIGNIFICANT CHANGE UP (ref 27–34)
MCHC RBC-ENTMCNC: 29.4 PG — SIGNIFICANT CHANGE UP (ref 27–34)
MCHC RBC-ENTMCNC: 29.5 GM/DL — LOW (ref 32–36)
MCHC RBC-ENTMCNC: 29.5 PG — SIGNIFICANT CHANGE UP (ref 27–34)
MCHC RBC-ENTMCNC: 29.5 PG — SIGNIFICANT CHANGE UP (ref 27–34)
MCHC RBC-ENTMCNC: 29.6 PG — SIGNIFICANT CHANGE UP (ref 27–34)
MCHC RBC-ENTMCNC: 29.7 PG — SIGNIFICANT CHANGE UP (ref 27–34)
MCHC RBC-ENTMCNC: 29.8 PG — SIGNIFICANT CHANGE UP (ref 27–34)
MCHC RBC-ENTMCNC: 30 GM/DL — LOW (ref 32–36)
MCHC RBC-ENTMCNC: 30 PG — SIGNIFICANT CHANGE UP (ref 27–34)
MCHC RBC-ENTMCNC: 30.1 GM/DL — LOW (ref 32–36)
MCHC RBC-ENTMCNC: 30.1 GM/DL — LOW (ref 32–36)
MCHC RBC-ENTMCNC: 30.2 GM/DL — LOW (ref 32–36)
MCHC RBC-ENTMCNC: 30.3 GM/DL — LOW (ref 32–36)
MCHC RBC-ENTMCNC: 30.4 GM/DL — LOW (ref 32–36)
MCHC RBC-ENTMCNC: 30.4 GM/DL — LOW (ref 32–36)
MCHC RBC-ENTMCNC: 30.4 PG — SIGNIFICANT CHANGE UP (ref 27–34)
MCHC RBC-ENTMCNC: 30.5 GM/DL — LOW (ref 32–36)
MCHC RBC-ENTMCNC: 30.6 GM/DL — LOW (ref 32–36)
MCHC RBC-ENTMCNC: 30.7 GM/DL — LOW (ref 32–36)
MCHC RBC-ENTMCNC: 30.7 PG — SIGNIFICANT CHANGE UP (ref 27–34)
MCHC RBC-ENTMCNC: 30.8 GM/DL — LOW (ref 32–36)
MCHC RBC-ENTMCNC: 30.8 PG — SIGNIFICANT CHANGE UP (ref 27–34)
MCHC RBC-ENTMCNC: 30.9 GM/DL — LOW (ref 32–36)
MCHC RBC-ENTMCNC: 31 GM/DL — LOW (ref 32–36)
MCHC RBC-ENTMCNC: 31.1 GM/DL — LOW (ref 32–36)
MCHC RBC-ENTMCNC: 31.2 GM/DL — LOW (ref 32–36)
MCHC RBC-ENTMCNC: 31.2 PG — SIGNIFICANT CHANGE UP (ref 27–34)
MCHC RBC-ENTMCNC: 31.3 GM/DL — LOW (ref 32–36)
MCHC RBC-ENTMCNC: 31.4 GM/DL — LOW (ref 32–36)
MCHC RBC-ENTMCNC: 31.5 GM/DL — LOW (ref 32–36)
MCHC RBC-ENTMCNC: 31.5 GM/DL — LOW (ref 32–36)
MCHC RBC-ENTMCNC: 31.6 GM/DL — LOW (ref 32–36)
MCHC RBC-ENTMCNC: 31.7 GM/DL — LOW (ref 32–36)
MCHC RBC-ENTMCNC: 31.7 GM/DL — LOW (ref 32–36)
MCHC RBC-ENTMCNC: 31.9 GM/DL — LOW (ref 32–36)
MCHC RBC-ENTMCNC: 31.9 GM/DL — LOW (ref 32–36)
MCHC RBC-ENTMCNC: 32.1 GM/DL — SIGNIFICANT CHANGE UP (ref 32–36)
MCHC RBC-ENTMCNC: 32.1 GM/DL — SIGNIFICANT CHANGE UP (ref 32–36)
MCHC RBC-ENTMCNC: 32.3 GM/DL — SIGNIFICANT CHANGE UP (ref 32–36)
MCV RBC AUTO: 84 FL — SIGNIFICANT CHANGE UP (ref 80–100)
MCV RBC AUTO: 84.2 FL — SIGNIFICANT CHANGE UP (ref 80–100)
MCV RBC AUTO: 84.4 FL — SIGNIFICANT CHANGE UP (ref 80–100)
MCV RBC AUTO: 85.7 FL — SIGNIFICANT CHANGE UP (ref 80–100)
MCV RBC AUTO: 85.8 FL — SIGNIFICANT CHANGE UP (ref 80–100)
MCV RBC AUTO: 86.6 FL — SIGNIFICANT CHANGE UP (ref 80–100)
MCV RBC AUTO: 87 FL — SIGNIFICANT CHANGE UP (ref 80–100)
MCV RBC AUTO: 87.1 FL — SIGNIFICANT CHANGE UP (ref 80–100)
MCV RBC AUTO: 87.2 FL — SIGNIFICANT CHANGE UP (ref 80–100)
MCV RBC AUTO: 87.6 FL — SIGNIFICANT CHANGE UP (ref 80–100)
MCV RBC AUTO: 87.6 FL — SIGNIFICANT CHANGE UP (ref 80–100)
MCV RBC AUTO: 88.1 FL — SIGNIFICANT CHANGE UP (ref 80–100)
MCV RBC AUTO: 88.3 FL — SIGNIFICANT CHANGE UP (ref 80–100)
MCV RBC AUTO: 88.4 FL — SIGNIFICANT CHANGE UP (ref 80–100)
MCV RBC AUTO: 88.5 FL — SIGNIFICANT CHANGE UP (ref 80–100)
MCV RBC AUTO: 88.6 FL — SIGNIFICANT CHANGE UP (ref 80–100)
MCV RBC AUTO: 88.8 FL — SIGNIFICANT CHANGE UP (ref 80–100)
MCV RBC AUTO: 89.2 FL — SIGNIFICANT CHANGE UP (ref 80–100)
MCV RBC AUTO: 89.3 FL — SIGNIFICANT CHANGE UP (ref 80–100)
MCV RBC AUTO: 89.4 FL — SIGNIFICANT CHANGE UP (ref 80–100)
MCV RBC AUTO: 89.5 FL — SIGNIFICANT CHANGE UP (ref 80–100)
MCV RBC AUTO: 90 FL — SIGNIFICANT CHANGE UP (ref 80–100)
MCV RBC AUTO: 90.1 FL — SIGNIFICANT CHANGE UP (ref 80–100)
MCV RBC AUTO: 90.2 FL — SIGNIFICANT CHANGE UP (ref 80–100)
MCV RBC AUTO: 90.4 FL — SIGNIFICANT CHANGE UP (ref 80–100)
MCV RBC AUTO: 90.6 FL — SIGNIFICANT CHANGE UP (ref 80–100)
MCV RBC AUTO: 90.6 FL — SIGNIFICANT CHANGE UP (ref 80–100)
MCV RBC AUTO: 90.7 FL — SIGNIFICANT CHANGE UP (ref 80–100)
MCV RBC AUTO: 90.8 FL — SIGNIFICANT CHANGE UP (ref 80–100)
MCV RBC AUTO: 93.2 FL — SIGNIFICANT CHANGE UP (ref 80–100)
MCV RBC AUTO: 93.7 FL — SIGNIFICANT CHANGE UP (ref 80–100)
MCV RBC AUTO: 94 FL — SIGNIFICANT CHANGE UP (ref 80–100)
MCV RBC AUTO: 94.2 FL — SIGNIFICANT CHANGE UP (ref 80–100)
MCV RBC AUTO: 94.2 FL — SIGNIFICANT CHANGE UP (ref 80–100)
MCV RBC AUTO: 94.3 FL — SIGNIFICANT CHANGE UP (ref 80–100)
MCV RBC AUTO: 94.4 FL — SIGNIFICANT CHANGE UP (ref 80–100)
MCV RBC AUTO: 94.5 FL — SIGNIFICANT CHANGE UP (ref 80–100)
MCV RBC AUTO: 94.6 FL — SIGNIFICANT CHANGE UP (ref 80–100)
MCV RBC AUTO: 94.6 FL — SIGNIFICANT CHANGE UP (ref 80–100)
MCV RBC AUTO: 94.8 FL — SIGNIFICANT CHANGE UP (ref 80–100)
MCV RBC AUTO: 94.9 FL — SIGNIFICANT CHANGE UP (ref 80–100)
MCV RBC AUTO: 95 FL — SIGNIFICANT CHANGE UP (ref 80–100)
MCV RBC AUTO: 95.1 FL — SIGNIFICANT CHANGE UP (ref 80–100)
MCV RBC AUTO: 95.2 FL — SIGNIFICANT CHANGE UP (ref 80–100)
MCV RBC AUTO: 95.3 FL — SIGNIFICANT CHANGE UP (ref 80–100)
MCV RBC AUTO: 95.3 FL — SIGNIFICANT CHANGE UP (ref 80–100)
MCV RBC AUTO: 95.5 FL — SIGNIFICANT CHANGE UP (ref 80–100)
MCV RBC AUTO: 96.1 FL — SIGNIFICANT CHANGE UP (ref 80–100)
MCV RBC AUTO: 96.4 FL — SIGNIFICANT CHANGE UP (ref 80–100)
MCV RBC AUTO: 96.6 FL — SIGNIFICANT CHANGE UP (ref 80–100)
MCV RBC AUTO: 96.7 FL — SIGNIFICANT CHANGE UP (ref 80–100)
MCV RBC AUTO: 96.7 FL — SIGNIFICANT CHANGE UP (ref 80–100)
MCV RBC AUTO: 97.3 FL — SIGNIFICANT CHANGE UP (ref 80–100)
MCV RBC AUTO: 98.4 FL — SIGNIFICANT CHANGE UP (ref 80–100)
MICROCYTES BLD QL: SLIGHT — SIGNIFICANT CHANGE UP
MONOCYTES # BLD AUTO: 0 K/UL — SIGNIFICANT CHANGE UP (ref 0–0.9)
MONOCYTES # BLD AUTO: 0.03 K/UL — SIGNIFICANT CHANGE UP (ref 0–0.9)
MONOCYTES # BLD AUTO: 0.03 K/UL — SIGNIFICANT CHANGE UP (ref 0–0.9)
MONOCYTES # BLD AUTO: 0.05 K/UL — SIGNIFICANT CHANGE UP (ref 0–0.9)
MONOCYTES # BLD AUTO: 0.05 K/UL — SIGNIFICANT CHANGE UP (ref 0–0.9)
MONOCYTES # BLD AUTO: 0.1 K/UL — SIGNIFICANT CHANGE UP (ref 0–0.9)
MONOCYTES # BLD AUTO: 0.1 K/UL — SIGNIFICANT CHANGE UP (ref 0–0.9)
MONOCYTES # BLD AUTO: 0.11 K/UL — SIGNIFICANT CHANGE UP (ref 0–0.9)
MONOCYTES # BLD AUTO: 0.12 K/UL — SIGNIFICANT CHANGE UP (ref 0–0.9)
MONOCYTES # BLD AUTO: 0.13 K/UL — SIGNIFICANT CHANGE UP (ref 0–0.9)
MONOCYTES # BLD AUTO: 0.14 K/UL — SIGNIFICANT CHANGE UP (ref 0–0.9)
MONOCYTES # BLD AUTO: 0.14 K/UL — SIGNIFICANT CHANGE UP (ref 0–0.9)
MONOCYTES # BLD AUTO: 0.15 K/UL — SIGNIFICANT CHANGE UP (ref 0–0.9)
MONOCYTES # BLD AUTO: 0.18 K/UL — SIGNIFICANT CHANGE UP (ref 0–0.9)
MONOCYTES # BLD AUTO: 0.18 K/UL — SIGNIFICANT CHANGE UP (ref 0–0.9)
MONOCYTES # BLD AUTO: 0.19 K/UL — SIGNIFICANT CHANGE UP (ref 0–0.9)
MONOCYTES NFR BLD AUTO: 0 % — LOW (ref 2–14)
MONOCYTES NFR BLD AUTO: 1 % — LOW (ref 2–14)
MONOCYTES NFR BLD AUTO: 1 % — LOW (ref 2–14)
MONOCYTES NFR BLD AUTO: 1.4 % — LOW (ref 2–14)
MONOCYTES NFR BLD AUTO: 1.4 % — LOW (ref 2–14)
MONOCYTES NFR BLD AUTO: 2.8 % — SIGNIFICANT CHANGE UP (ref 2–14)
MONOCYTES NFR BLD AUTO: 3.2 % — SIGNIFICANT CHANGE UP (ref 2–14)
MONOCYTES NFR BLD AUTO: 3.7 % — SIGNIFICANT CHANGE UP (ref 2–14)
MONOCYTES NFR BLD AUTO: 3.7 % — SIGNIFICANT CHANGE UP (ref 2–14)
MONOCYTES NFR BLD AUTO: 3.9 % — SIGNIFICANT CHANGE UP (ref 2–14)
MONOCYTES NFR BLD AUTO: 4 % — SIGNIFICANT CHANGE UP (ref 2–14)
MONOCYTES NFR BLD AUTO: 4.4 % — SIGNIFICANT CHANGE UP (ref 2–14)
MONOCYTES NFR BLD AUTO: 5 % — SIGNIFICANT CHANGE UP (ref 2–14)
MONOCYTES NFR BLD AUTO: 5.1 % — SIGNIFICANT CHANGE UP (ref 2–14)
MONOCYTES NFR BLD AUTO: 5.3 % — SIGNIFICANT CHANGE UP (ref 2–14)
MONOCYTES NFR BLD AUTO: 7 % — SIGNIFICANT CHANGE UP (ref 2–14)
MRSA PCR RESULT.: SIGNIFICANT CHANGE UP
NEUTROPHILS # BLD AUTO: 1.81 K/UL — SIGNIFICANT CHANGE UP (ref 1.8–7.4)
NEUTROPHILS # BLD AUTO: 2.05 K/UL — SIGNIFICANT CHANGE UP (ref 1.8–7.4)
NEUTROPHILS # BLD AUTO: 2.16 K/UL — SIGNIFICANT CHANGE UP (ref 1.8–7.4)
NEUTROPHILS # BLD AUTO: 2.2 K/UL — SIGNIFICANT CHANGE UP (ref 1.8–7.4)
NEUTROPHILS # BLD AUTO: 2.43 K/UL — SIGNIFICANT CHANGE UP (ref 1.8–7.4)
NEUTROPHILS # BLD AUTO: 2.52 K/UL — SIGNIFICANT CHANGE UP (ref 1.8–7.4)
NEUTROPHILS # BLD AUTO: 2.52 K/UL — SIGNIFICANT CHANGE UP (ref 1.8–7.4)
NEUTROPHILS # BLD AUTO: 2.53 K/UL — SIGNIFICANT CHANGE UP (ref 1.8–7.4)
NEUTROPHILS # BLD AUTO: 2.53 K/UL — SIGNIFICANT CHANGE UP (ref 1.8–7.4)
NEUTROPHILS # BLD AUTO: 2.58 K/UL — SIGNIFICANT CHANGE UP (ref 1.8–7.4)
NEUTROPHILS # BLD AUTO: 2.66 K/UL — SIGNIFICANT CHANGE UP (ref 1.8–7.4)
NEUTROPHILS # BLD AUTO: 2.85 K/UL — SIGNIFICANT CHANGE UP (ref 1.8–7.4)
NEUTROPHILS # BLD AUTO: 2.99 K/UL — SIGNIFICANT CHANGE UP (ref 1.8–7.4)
NEUTROPHILS # BLD AUTO: 3.21 K/UL — SIGNIFICANT CHANGE UP (ref 1.8–7.4)
NEUTROPHILS # BLD AUTO: 3.35 K/UL — SIGNIFICANT CHANGE UP (ref 1.8–7.4)
NEUTROPHILS # BLD AUTO: 3.35 K/UL — SIGNIFICANT CHANGE UP (ref 1.8–7.4)
NEUTROPHILS # BLD AUTO: 3.94 K/UL — SIGNIFICANT CHANGE UP (ref 1.8–7.4)
NEUTROPHILS # BLD AUTO: 4.38 K/UL — SIGNIFICANT CHANGE UP (ref 1.8–7.4)
NEUTROPHILS NFR BLD AUTO: 79.5 % — HIGH (ref 43–77)
NEUTROPHILS NFR BLD AUTO: 79.7 % — HIGH (ref 43–77)
NEUTROPHILS NFR BLD AUTO: 80 % — HIGH (ref 43–77)
NEUTROPHILS NFR BLD AUTO: 80.5 % — HIGH (ref 43–77)
NEUTROPHILS NFR BLD AUTO: 80.9 % — HIGH (ref 43–77)
NEUTROPHILS NFR BLD AUTO: 81.7 % — HIGH (ref 43–77)
NEUTROPHILS NFR BLD AUTO: 82.2 % — HIGH (ref 43–77)
NEUTROPHILS NFR BLD AUTO: 84.8 % — HIGH (ref 43–77)
NEUTROPHILS NFR BLD AUTO: 86.2 % — HIGH (ref 43–77)
NEUTROPHILS NFR BLD AUTO: 87.8 % — HIGH (ref 43–77)
NEUTROPHILS NFR BLD AUTO: 88 % — HIGH (ref 43–77)
NEUTROPHILS NFR BLD AUTO: 88 % — HIGH (ref 43–77)
NEUTROPHILS NFR BLD AUTO: 89 % — HIGH (ref 43–77)
NEUTROPHILS NFR BLD AUTO: 90 % — HIGH (ref 43–77)
NEUTROPHILS NFR BLD AUTO: 94.4 % — HIGH (ref 43–77)
NEUTROPHILS NFR BLD AUTO: 94.4 % — HIGH (ref 43–77)
NRBC # BLD: 0 /100 WBCS — SIGNIFICANT CHANGE UP (ref 0–0)
NRBC # BLD: 0 /100 — SIGNIFICANT CHANGE UP (ref 0–0)
NT-PROBNP SERPL-SCNC: HIGH PG/ML (ref 0–125)
OVALOCYTES BLD QL SMEAR: SLIGHT — SIGNIFICANT CHANGE UP
PCO2 BLDV: 45 MMHG — SIGNIFICANT CHANGE UP (ref 42–55)
PH BLDV: 7.35 — SIGNIFICANT CHANGE UP (ref 7.32–7.43)
PHOSPHATE SERPL-MCNC: 1.7 MG/DL — LOW (ref 2.5–4.5)
PHOSPHATE SERPL-MCNC: 1.7 MG/DL — LOW (ref 2.5–4.5)
PHOSPHATE SERPL-MCNC: 2.4 MG/DL — LOW (ref 2.5–4.5)
PHOSPHATE SERPL-MCNC: 2.7 MG/DL — SIGNIFICANT CHANGE UP (ref 2.5–4.5)
PHOSPHATE SERPL-MCNC: 3 MG/DL — SIGNIFICANT CHANGE UP (ref 2.5–4.5)
PHOSPHATE SERPL-MCNC: 3 MG/DL — SIGNIFICANT CHANGE UP (ref 2.5–4.5)
PHOSPHATE SERPL-MCNC: 3.4 MG/DL — SIGNIFICANT CHANGE UP (ref 2.5–4.5)
PHOSPHATE SERPL-MCNC: 3.7 MG/DL — SIGNIFICANT CHANGE UP (ref 2.5–4.5)
PHOSPHATE SERPL-MCNC: 3.7 MG/DL — SIGNIFICANT CHANGE UP (ref 2.5–4.5)
PHOSPHATE SERPL-MCNC: 4 MG/DL — SIGNIFICANT CHANGE UP (ref 2.5–4.5)
PHOSPHATE SERPL-MCNC: 4.1 MG/DL — SIGNIFICANT CHANGE UP (ref 2.5–4.5)
PHOSPHATE SERPL-MCNC: 4.5 MG/DL — SIGNIFICANT CHANGE UP (ref 2.5–4.5)
PHOSPHATE SERPL-MCNC: 4.5 MG/DL — SIGNIFICANT CHANGE UP (ref 2.5–4.5)
PHOSPHATE SERPL-MCNC: 4.6 MG/DL — HIGH (ref 2.5–4.5)
PHOSPHATE SERPL-MCNC: 4.8 MG/DL — HIGH (ref 2.5–4.5)
PHOSPHATE SERPL-MCNC: 4.8 MG/DL — HIGH (ref 2.5–4.5)
PHOSPHATE SERPL-MCNC: 5 MG/DL — HIGH (ref 2.5–4.5)
PHOSPHATE SERPL-MCNC: 5.1 MG/DL — HIGH (ref 2.5–4.5)
PHOSPHATE SERPL-MCNC: 5.1 MG/DL — HIGH (ref 2.5–4.5)
PHOSPHATE SERPL-MCNC: 5.2 MG/DL — HIGH (ref 2.5–4.5)
PHOSPHATE SERPL-MCNC: 5.2 MG/DL — HIGH (ref 2.5–4.5)
PHOSPHATE SERPL-MCNC: 5.3 MG/DL — HIGH (ref 2.5–4.5)
PHOSPHATE SERPL-MCNC: 5.3 MG/DL — HIGH (ref 2.5–4.5)
PHOSPHATE SERPL-MCNC: 5.5 MG/DL — HIGH (ref 2.5–4.5)
PHOSPHATE SERPL-MCNC: 5.5 MG/DL — HIGH (ref 2.5–4.5)
PHOSPHATE SERPL-MCNC: 5.6 MG/DL — HIGH (ref 2.5–4.5)
PHOSPHATE SERPL-MCNC: 5.6 MG/DL — HIGH (ref 2.5–4.5)
PHOSPHATE SERPL-MCNC: 5.7 MG/DL — HIGH (ref 2.5–4.5)
PHOSPHATE SERPL-MCNC: 6.1 MG/DL — HIGH (ref 2.5–4.5)
PHOSPHATE SERPL-MCNC: 6.6 MG/DL — HIGH (ref 2.5–4.5)
PHOSPHATE SERPL-MCNC: 7.1 MG/DL — HIGH (ref 2.5–4.5)
PHOSPHATE SERPL-MCNC: 7.2 MG/DL — HIGH (ref 2.5–4.5)
PLAT MORPH BLD: ABNORMAL
PLAT MORPH BLD: NORMAL — SIGNIFICANT CHANGE UP
PLATELET # BLD AUTO: 100 K/UL — LOW (ref 150–400)
PLATELET # BLD AUTO: 104 K/UL — LOW (ref 150–400)
PLATELET # BLD AUTO: 105 K/UL — LOW (ref 150–400)
PLATELET # BLD AUTO: 108 K/UL — LOW (ref 150–400)
PLATELET # BLD AUTO: 109 K/UL — LOW (ref 150–400)
PLATELET # BLD AUTO: 110 K/UL — LOW (ref 150–400)
PLATELET # BLD AUTO: 113 K/UL — LOW (ref 150–400)
PLATELET # BLD AUTO: 113 K/UL — LOW (ref 150–400)
PLATELET # BLD AUTO: 114 K/UL — LOW (ref 150–400)
PLATELET # BLD AUTO: 114 K/UL — LOW (ref 150–400)
PLATELET # BLD AUTO: 116 K/UL — LOW (ref 150–400)
PLATELET # BLD AUTO: 119 K/UL — LOW (ref 150–400)
PLATELET # BLD AUTO: 127 K/UL — LOW (ref 150–400)
PLATELET # BLD AUTO: 128 K/UL — LOW (ref 150–400)
PLATELET # BLD AUTO: 45 K/UL — LOW (ref 150–400)
PLATELET # BLD AUTO: 45 K/UL — LOW (ref 150–400)
PLATELET # BLD AUTO: 62 K/UL — LOW (ref 150–400)
PLATELET # BLD AUTO: 67 K/UL — LOW (ref 150–400)
PLATELET # BLD AUTO: 71 K/UL — LOW (ref 150–400)
PLATELET # BLD AUTO: 73 K/UL — LOW (ref 150–400)
PLATELET # BLD AUTO: 75 K/UL — LOW (ref 150–400)
PLATELET # BLD AUTO: 75 K/UL — LOW (ref 150–400)
PLATELET # BLD AUTO: 79 K/UL — LOW (ref 150–400)
PLATELET # BLD AUTO: 81 K/UL — LOW (ref 150–400)
PLATELET # BLD AUTO: 82 K/UL — LOW (ref 150–400)
PLATELET # BLD AUTO: 82 K/UL — LOW (ref 150–400)
PLATELET # BLD AUTO: 83 K/UL — LOW (ref 150–400)
PLATELET # BLD AUTO: 83 K/UL — LOW (ref 150–400)
PLATELET # BLD AUTO: 85 K/UL — LOW (ref 150–400)
PLATELET # BLD AUTO: 85 K/UL — LOW (ref 150–400)
PLATELET # BLD AUTO: 86 K/UL — LOW (ref 150–400)
PLATELET # BLD AUTO: 87 K/UL — LOW (ref 150–400)
PLATELET # BLD AUTO: 89 K/UL — LOW (ref 150–400)
PLATELET # BLD AUTO: 90 K/UL — LOW (ref 150–400)
PLATELET # BLD AUTO: 92 K/UL — LOW (ref 150–400)
PLATELET # BLD AUTO: 92 K/UL — LOW (ref 150–400)
PLATELET # BLD AUTO: 93 K/UL — LOW (ref 150–400)
PLATELET # BLD AUTO: 94 K/UL — LOW (ref 150–400)
PLATELET # BLD AUTO: 94 K/UL — LOW (ref 150–400)
PLATELET # BLD AUTO: 95 K/UL — LOW (ref 150–400)
PLATELET # BLD AUTO: 96 K/UL — LOW (ref 150–400)
PLATELET # BLD AUTO: 97 K/UL — LOW (ref 150–400)
PLATELET # BLD AUTO: SIGNIFICANT CHANGE UP K/UL (ref 150–400)
PLATELET CLUMP BLD QL SMEAR: SLIGHT
PLATELET CLUMP BLD QL SMEAR: SLIGHT
PLATELET COUNT - ESTIMATE: ABNORMAL
PLATELET COUNT - ESTIMATE: NORMAL — SIGNIFICANT CHANGE UP
PO2 BLDV: 59 MMHG — SIGNIFICANT CHANGE UP
POIKILOCYTOSIS BLD QL AUTO: SLIGHT — SIGNIFICANT CHANGE UP
POLYCHROMASIA BLD QL SMEAR: SLIGHT — SIGNIFICANT CHANGE UP
POTASSIUM BLDV-SCNC: 4.3 MMOL/L — SIGNIFICANT CHANGE UP (ref 3.5–5.1)
POTASSIUM SERPL-MCNC: 3.7 MMOL/L — SIGNIFICANT CHANGE UP (ref 3.5–5.3)
POTASSIUM SERPL-MCNC: 3.8 MMOL/L — SIGNIFICANT CHANGE UP (ref 3.5–5.3)
POTASSIUM SERPL-MCNC: 3.9 MMOL/L — SIGNIFICANT CHANGE UP (ref 3.5–5.3)
POTASSIUM SERPL-MCNC: 3.9 MMOL/L — SIGNIFICANT CHANGE UP (ref 3.5–5.3)
POTASSIUM SERPL-MCNC: 4 MMOL/L — SIGNIFICANT CHANGE UP (ref 3.5–5.3)
POTASSIUM SERPL-MCNC: 4 MMOL/L — SIGNIFICANT CHANGE UP (ref 3.5–5.3)
POTASSIUM SERPL-MCNC: 4.1 MMOL/L — SIGNIFICANT CHANGE UP (ref 3.5–5.3)
POTASSIUM SERPL-MCNC: 4.2 MMOL/L — SIGNIFICANT CHANGE UP (ref 3.5–5.3)
POTASSIUM SERPL-MCNC: 4.3 MMOL/L — SIGNIFICANT CHANGE UP (ref 3.5–5.3)
POTASSIUM SERPL-MCNC: 4.4 MMOL/L — SIGNIFICANT CHANGE UP (ref 3.5–5.3)
POTASSIUM SERPL-MCNC: 4.4 MMOL/L — SIGNIFICANT CHANGE UP (ref 3.5–5.3)
POTASSIUM SERPL-MCNC: 4.5 MMOL/L — SIGNIFICANT CHANGE UP (ref 3.5–5.3)
POTASSIUM SERPL-MCNC: 4.6 MMOL/L — SIGNIFICANT CHANGE UP (ref 3.5–5.3)
POTASSIUM SERPL-MCNC: 4.6 MMOL/L — SIGNIFICANT CHANGE UP (ref 3.5–5.3)
POTASSIUM SERPL-MCNC: 4.7 MMOL/L — SIGNIFICANT CHANGE UP (ref 3.5–5.3)
POTASSIUM SERPL-MCNC: 4.8 MMOL/L — SIGNIFICANT CHANGE UP (ref 3.5–5.3)
POTASSIUM SERPL-MCNC: 4.8 MMOL/L — SIGNIFICANT CHANGE UP (ref 3.5–5.3)
POTASSIUM SERPL-MCNC: 4.9 MMOL/L — SIGNIFICANT CHANGE UP (ref 3.5–5.3)
POTASSIUM SERPL-MCNC: 5 MMOL/L — SIGNIFICANT CHANGE UP (ref 3.5–5.3)
POTASSIUM SERPL-MCNC: 5.1 MMOL/L — SIGNIFICANT CHANGE UP (ref 3.5–5.3)
POTASSIUM SERPL-MCNC: 5.2 MMOL/L — SIGNIFICANT CHANGE UP (ref 3.5–5.3)
POTASSIUM SERPL-MCNC: 5.2 MMOL/L — SIGNIFICANT CHANGE UP (ref 3.5–5.3)
POTASSIUM SERPL-MCNC: 5.3 MMOL/L — SIGNIFICANT CHANGE UP (ref 3.5–5.3)
POTASSIUM SERPL-MCNC: 5.4 MMOL/L — HIGH (ref 3.5–5.3)
POTASSIUM SERPL-MCNC: 5.4 MMOL/L — HIGH (ref 3.5–5.3)
POTASSIUM SERPL-MCNC: 5.6 MMOL/L — HIGH (ref 3.5–5.3)
POTASSIUM SERPL-MCNC: 5.7 MMOL/L — HIGH (ref 3.5–5.3)
POTASSIUM SERPL-MCNC: 5.7 MMOL/L — HIGH (ref 3.5–5.3)
POTASSIUM SERPL-MCNC: 6 MMOL/L — HIGH (ref 3.5–5.3)
POTASSIUM SERPL-MCNC: 6 MMOL/L — HIGH (ref 3.5–5.3)
POTASSIUM SERPL-MCNC: 6.1 MMOL/L — HIGH (ref 3.5–5.3)
POTASSIUM SERPL-MCNC: 6.2 MMOL/L — CRITICAL HIGH (ref 3.5–5.3)
POTASSIUM SERPL-MCNC: 6.3 MMOL/L — CRITICAL HIGH (ref 3.5–5.3)
POTASSIUM SERPL-MCNC: 6.9 MMOL/L — CRITICAL HIGH (ref 3.5–5.3)
POTASSIUM SERPL-MCNC: 6.9 MMOL/L — CRITICAL HIGH (ref 3.5–5.3)
POTASSIUM SERPL-SCNC: 3.7 MMOL/L — SIGNIFICANT CHANGE UP (ref 3.5–5.3)
POTASSIUM SERPL-SCNC: 3.8 MMOL/L — SIGNIFICANT CHANGE UP (ref 3.5–5.3)
POTASSIUM SERPL-SCNC: 3.9 MMOL/L — SIGNIFICANT CHANGE UP (ref 3.5–5.3)
POTASSIUM SERPL-SCNC: 3.9 MMOL/L — SIGNIFICANT CHANGE UP (ref 3.5–5.3)
POTASSIUM SERPL-SCNC: 4 MMOL/L — SIGNIFICANT CHANGE UP (ref 3.5–5.3)
POTASSIUM SERPL-SCNC: 4 MMOL/L — SIGNIFICANT CHANGE UP (ref 3.5–5.3)
POTASSIUM SERPL-SCNC: 4.1 MMOL/L — SIGNIFICANT CHANGE UP (ref 3.5–5.3)
POTASSIUM SERPL-SCNC: 4.2 MMOL/L — SIGNIFICANT CHANGE UP (ref 3.5–5.3)
POTASSIUM SERPL-SCNC: 4.3 MMOL/L — SIGNIFICANT CHANGE UP (ref 3.5–5.3)
POTASSIUM SERPL-SCNC: 4.4 MMOL/L — SIGNIFICANT CHANGE UP (ref 3.5–5.3)
POTASSIUM SERPL-SCNC: 4.4 MMOL/L — SIGNIFICANT CHANGE UP (ref 3.5–5.3)
POTASSIUM SERPL-SCNC: 4.5 MMOL/L — SIGNIFICANT CHANGE UP (ref 3.5–5.3)
POTASSIUM SERPL-SCNC: 4.6 MMOL/L — SIGNIFICANT CHANGE UP (ref 3.5–5.3)
POTASSIUM SERPL-SCNC: 4.6 MMOL/L — SIGNIFICANT CHANGE UP (ref 3.5–5.3)
POTASSIUM SERPL-SCNC: 4.7 MMOL/L — SIGNIFICANT CHANGE UP (ref 3.5–5.3)
POTASSIUM SERPL-SCNC: 4.8 MMOL/L — SIGNIFICANT CHANGE UP (ref 3.5–5.3)
POTASSIUM SERPL-SCNC: 4.8 MMOL/L — SIGNIFICANT CHANGE UP (ref 3.5–5.3)
POTASSIUM SERPL-SCNC: 4.9 MMOL/L — SIGNIFICANT CHANGE UP (ref 3.5–5.3)
POTASSIUM SERPL-SCNC: 5 MMOL/L — SIGNIFICANT CHANGE UP (ref 3.5–5.3)
POTASSIUM SERPL-SCNC: 5.1 MMOL/L — SIGNIFICANT CHANGE UP (ref 3.5–5.3)
POTASSIUM SERPL-SCNC: 5.2 MMOL/L — SIGNIFICANT CHANGE UP (ref 3.5–5.3)
POTASSIUM SERPL-SCNC: 5.2 MMOL/L — SIGNIFICANT CHANGE UP (ref 3.5–5.3)
POTASSIUM SERPL-SCNC: 5.3 MMOL/L — SIGNIFICANT CHANGE UP (ref 3.5–5.3)
POTASSIUM SERPL-SCNC: 5.4 MMOL/L — HIGH (ref 3.5–5.3)
POTASSIUM SERPL-SCNC: 5.4 MMOL/L — HIGH (ref 3.5–5.3)
POTASSIUM SERPL-SCNC: 5.6 MMOL/L — HIGH (ref 3.5–5.3)
POTASSIUM SERPL-SCNC: 5.7 MMOL/L — HIGH (ref 3.5–5.3)
POTASSIUM SERPL-SCNC: 5.7 MMOL/L — HIGH (ref 3.5–5.3)
POTASSIUM SERPL-SCNC: 6 MMOL/L — HIGH (ref 3.5–5.3)
POTASSIUM SERPL-SCNC: 6 MMOL/L — HIGH (ref 3.5–5.3)
POTASSIUM SERPL-SCNC: 6.1 MMOL/L — HIGH (ref 3.5–5.3)
POTASSIUM SERPL-SCNC: 6.2 MMOL/L — CRITICAL HIGH (ref 3.5–5.3)
POTASSIUM SERPL-SCNC: 6.3 MMOL/L — CRITICAL HIGH (ref 3.5–5.3)
POTASSIUM SERPL-SCNC: 6.9 MMOL/L — CRITICAL HIGH (ref 3.5–5.3)
POTASSIUM SERPL-SCNC: 6.9 MMOL/L — CRITICAL HIGH (ref 3.5–5.3)
PROCALCITONIN SERPL-MCNC: 1.8 NG/ML — HIGH (ref 0.02–0.1)
PROT SERPL-MCNC: 5.5 G/DL — LOW (ref 6–8.3)
PROT SERPL-MCNC: 5.8 G/DL — LOW (ref 6–8.3)
PROT SERPL-MCNC: 5.9 G/DL — LOW (ref 6–8.3)
PROT SERPL-MCNC: 6 G/DL — SIGNIFICANT CHANGE UP (ref 6–8.3)
PROT SERPL-MCNC: 6.1 G/DL — SIGNIFICANT CHANGE UP (ref 6–8.3)
PROT SERPL-MCNC: 6.3 G/DL — SIGNIFICANT CHANGE UP (ref 6–8.3)
PROT SERPL-MCNC: 6.4 G/DL — SIGNIFICANT CHANGE UP (ref 6–8.3)
PROT SERPL-MCNC: 6.5 G/DL — SIGNIFICANT CHANGE UP (ref 6–8.3)
PROT SERPL-MCNC: 6.6 G/DL — SIGNIFICANT CHANGE UP (ref 6–8.3)
PROT SERPL-MCNC: 6.6 G/DL — SIGNIFICANT CHANGE UP (ref 6–8.3)
PROT SERPL-MCNC: 6.7 G/DL — SIGNIFICANT CHANGE UP (ref 6–8.3)
PROT SERPL-MCNC: 6.7 G/DL — SIGNIFICANT CHANGE UP (ref 6–8.3)
PROT SERPL-MCNC: 6.8 G/DL — SIGNIFICANT CHANGE UP (ref 6–8.3)
PROT SERPL-MCNC: 7 G/DL — SIGNIFICANT CHANGE UP (ref 6–8.3)
PROT SERPL-MCNC: 8.2 G/DL — SIGNIFICANT CHANGE UP (ref 6–8.3)
PROT SERPL-MCNC: 8.2 G/DL — SIGNIFICANT CHANGE UP (ref 6–8.3)
PROTHROM AB SERPL-ACNC: 10.7 SEC — SIGNIFICANT CHANGE UP (ref 9.5–13)
PROTHROM AB SERPL-ACNC: 10.7 SEC — SIGNIFICANT CHANGE UP (ref 9.5–13)
PROTHROM AB SERPL-ACNC: 11.6 SEC — SIGNIFICANT CHANGE UP (ref 9.5–13)
PROTHROM AB SERPL-ACNC: 11.6 SEC — SIGNIFICANT CHANGE UP (ref 9.5–13)
PROTHROM AB SERPL-ACNC: 12.2 SEC — SIGNIFICANT CHANGE UP (ref 10.5–13.4)
PROTHROM AB SERPL-ACNC: 12.3 SEC — SIGNIFICANT CHANGE UP (ref 10.5–13.4)
PTH-INTACT FLD-MCNC: 105 PG/ML — HIGH (ref 15–65)
PTH-INTACT FLD-MCNC: 118 PG/ML — HIGH (ref 15–65)
PTH-INTACT FLD-MCNC: 139 PG/ML — HIGH (ref 15–65)
RAPID RVP RESULT: DETECTED
RAPID RVP RESULT: DETECTED
RAPID RVP RESULT: SIGNIFICANT CHANGE UP
RAPID RVP RESULT: SIGNIFICANT CHANGE UP
RBC # BLD: 1.82 M/UL — LOW (ref 4.2–5.8)
RBC # BLD: 2.34 M/UL — LOW (ref 4.2–5.8)
RBC # BLD: 2.43 M/UL — LOW (ref 4.2–5.8)
RBC # BLD: 2.5 M/UL — LOW (ref 4.2–5.8)
RBC # BLD: 2.54 M/UL — LOW (ref 4.2–5.8)
RBC # BLD: 2.55 M/UL — LOW (ref 4.2–5.8)
RBC # BLD: 2.59 M/UL — LOW (ref 4.2–5.8)
RBC # BLD: 2.61 M/UL — LOW (ref 4.2–5.8)
RBC # BLD: 2.64 M/UL — LOW (ref 4.2–5.8)
RBC # BLD: 2.67 M/UL — LOW (ref 4.2–5.8)
RBC # BLD: 2.69 M/UL — LOW (ref 4.2–5.8)
RBC # BLD: 2.7 M/UL — LOW (ref 4.2–5.8)
RBC # BLD: 2.73 M/UL — LOW (ref 4.2–5.8)
RBC # BLD: 2.75 M/UL — LOW (ref 4.2–5.8)
RBC # BLD: 2.75 M/UL — LOW (ref 4.2–5.8)
RBC # BLD: 2.77 M/UL — LOW (ref 4.2–5.8)
RBC # BLD: 2.77 M/UL — LOW (ref 4.2–5.8)
RBC # BLD: 2.79 M/UL — LOW (ref 4.2–5.8)
RBC # BLD: 2.81 M/UL — LOW (ref 4.2–5.8)
RBC # BLD: 2.82 M/UL — LOW (ref 4.2–5.8)
RBC # BLD: 2.87 M/UL — LOW (ref 4.2–5.8)
RBC # BLD: 2.87 M/UL — LOW (ref 4.2–5.8)
RBC # BLD: 2.89 M/UL — LOW (ref 4.2–5.8)
RBC # BLD: 2.9 M/UL — LOW (ref 4.2–5.8)
RBC # BLD: 2.91 M/UL — LOW (ref 4.2–5.8)
RBC # BLD: 2.92 M/UL — LOW (ref 4.2–5.8)
RBC # BLD: 2.95 M/UL — LOW (ref 4.2–5.8)
RBC # BLD: 2.96 M/UL — LOW (ref 4.2–5.8)
RBC # BLD: 2.96 M/UL — LOW (ref 4.2–5.8)
RBC # BLD: 2.98 M/UL — LOW (ref 4.2–5.8)
RBC # BLD: 2.99 M/UL — LOW (ref 4.2–5.8)
RBC # BLD: 3.03 M/UL — LOW (ref 4.2–5.8)
RBC # BLD: 3.06 M/UL — LOW (ref 4.2–5.8)
RBC # BLD: 3.14 M/UL — LOW (ref 4.2–5.8)
RBC # BLD: 3.16 M/UL — LOW (ref 4.2–5.8)
RBC # BLD: 3.21 M/UL — LOW (ref 4.2–5.8)
RBC # BLD: 3.21 M/UL — LOW (ref 4.2–5.8)
RBC # BLD: 3.22 M/UL — LOW (ref 4.2–5.8)
RBC # BLD: 3.48 M/UL — LOW (ref 4.2–5.8)
RBC # BLD: 3.51 M/UL — LOW (ref 4.2–5.8)
RBC # BLD: 3.6 M/UL — LOW (ref 4.2–5.8)
RBC # BLD: 3.61 M/UL — LOW (ref 4.2–5.8)
RBC # BLD: 3.64 M/UL — LOW (ref 4.2–5.8)
RBC # BLD: 3.7 M/UL — LOW (ref 4.2–5.8)
RBC # BLD: 3.87 M/UL — LOW (ref 4.2–5.8)
RBC # BLD: 4.03 M/UL — LOW (ref 4.2–5.8)
RBC # BLD: 4.03 M/UL — LOW (ref 4.2–5.8)
RBC # FLD: 15.9 % — HIGH (ref 10.3–14.5)
RBC # FLD: 16 % — HIGH (ref 10.3–14.5)
RBC # FLD: 16.5 % — HIGH (ref 10.3–14.5)
RBC # FLD: 17 % — HIGH (ref 10.3–14.5)
RBC # FLD: 17.2 % — HIGH (ref 10.3–14.5)
RBC # FLD: 17.4 % — HIGH (ref 10.3–14.5)
RBC # FLD: 17.5 % — HIGH (ref 10.3–14.5)
RBC # FLD: 17.5 % — HIGH (ref 10.3–14.5)
RBC # FLD: 17.6 % — HIGH (ref 10.3–14.5)
RBC # FLD: 17.7 % — HIGH (ref 10.3–14.5)
RBC # FLD: 17.7 % — HIGH (ref 10.3–14.5)
RBC # FLD: 17.8 % — HIGH (ref 10.3–14.5)
RBC # FLD: 18.1 % — HIGH (ref 10.3–14.5)
RBC # FLD: 18.1 % — HIGH (ref 10.3–14.5)
RBC # FLD: 18.2 % — HIGH (ref 10.3–14.5)
RBC # FLD: 18.3 % — HIGH (ref 10.3–14.5)
RBC # FLD: 18.3 % — HIGH (ref 10.3–14.5)
RBC # FLD: 18.6 % — HIGH (ref 10.3–14.5)
RBC # FLD: 18.7 % — HIGH (ref 10.3–14.5)
RBC # FLD: 18.8 % — HIGH (ref 10.3–14.5)
RBC # FLD: 18.9 % — HIGH (ref 10.3–14.5)
RBC # FLD: 18.9 % — HIGH (ref 10.3–14.5)
RBC # FLD: 19.2 % — HIGH (ref 10.3–14.5)
RBC # FLD: 19.5 % — HIGH (ref 10.3–14.5)
RBC # FLD: 19.7 % — HIGH (ref 10.3–14.5)
RBC # FLD: 19.7 % — HIGH (ref 10.3–14.5)
RBC # FLD: 19.9 % — HIGH (ref 10.3–14.5)
RBC # FLD: 20.1 % — HIGH (ref 10.3–14.5)
RBC # FLD: 20.1 % — HIGH (ref 10.3–14.5)
RBC # FLD: 20.2 % — HIGH (ref 10.3–14.5)
RBC # FLD: 20.4 % — HIGH (ref 10.3–14.5)
RBC # FLD: 20.7 % — HIGH (ref 10.3–14.5)
RBC # FLD: 21.3 % — HIGH (ref 10.3–14.5)
RBC # FLD: 21.8 % — HIGH (ref 10.3–14.5)
RBC # FLD: 21.8 % — HIGH (ref 10.3–14.5)
RBC # FLD: 22 % — HIGH (ref 10.3–14.5)
RBC # FLD: 22.4 % — HIGH (ref 10.3–14.5)
RBC # FLD: 22.4 % — HIGH (ref 10.3–14.5)
RBC # FLD: 22.5 % — HIGH (ref 10.3–14.5)
RBC # FLD: 22.5 % — HIGH (ref 10.3–14.5)
RBC # FLD: 22.9 % — HIGH (ref 10.3–14.5)
RBC # FLD: 23.1 % — HIGH (ref 10.3–14.5)
RBC # FLD: 23.2 % — HIGH (ref 10.3–14.5)
RBC # FLD: 23.7 % — HIGH (ref 10.3–14.5)
RBC # FLD: 24.2 % — HIGH (ref 10.3–14.5)
RBC # FLD: 24.3 % — HIGH (ref 10.3–14.5)
RBC BLD AUTO: ABNORMAL
RSV RNA SPEC QL NAA+PROBE: DETECTED
RSV RNA SPEC QL NAA+PROBE: DETECTED
S AUREUS DNA NOSE QL NAA+PROBE: SIGNIFICANT CHANGE UP
SAO2 % BLDV: 86.2 % — SIGNIFICANT CHANGE UP
SARS-COV-2 RNA SPEC QL NAA+PROBE: SIGNIFICANT CHANGE UP
SCHISTOCYTES BLD QL AUTO: SLIGHT — SIGNIFICANT CHANGE UP
SODIUM SERPL-SCNC: 122 MMOL/L — LOW (ref 135–145)
SODIUM SERPL-SCNC: 123 MMOL/L — LOW (ref 135–145)
SODIUM SERPL-SCNC: 126 MMOL/L — LOW (ref 135–145)
SODIUM SERPL-SCNC: 127 MMOL/L — LOW (ref 135–145)
SODIUM SERPL-SCNC: 128 MMOL/L — LOW (ref 135–145)
SODIUM SERPL-SCNC: 129 MMOL/L — LOW (ref 135–145)
SODIUM SERPL-SCNC: 130 MMOL/L — LOW (ref 135–145)
SODIUM SERPL-SCNC: 132 MMOL/L — LOW (ref 135–145)
SODIUM SERPL-SCNC: 133 MMOL/L — LOW (ref 135–145)
SODIUM SERPL-SCNC: 134 MMOL/L — LOW (ref 135–145)
SODIUM SERPL-SCNC: 135 MMOL/L — SIGNIFICANT CHANGE UP (ref 135–145)
SODIUM SERPL-SCNC: 135 MMOL/L — SIGNIFICANT CHANGE UP (ref 135–145)
SODIUM SERPL-SCNC: 136 MMOL/L — SIGNIFICANT CHANGE UP (ref 135–145)
SODIUM SERPL-SCNC: 138 MMOL/L — SIGNIFICANT CHANGE UP (ref 135–145)
SODIUM SERPL-SCNC: 139 MMOL/L — SIGNIFICANT CHANGE UP (ref 135–145)
SODIUM SERPL-SCNC: 140 MMOL/L — SIGNIFICANT CHANGE UP (ref 135–145)
SODIUM SERPL-SCNC: 141 MMOL/L — SIGNIFICANT CHANGE UP (ref 135–145)
SODIUM SERPL-SCNC: 141 MMOL/L — SIGNIFICANT CHANGE UP (ref 135–145)
SODIUM SERPL-SCNC: 142 MMOL/L — SIGNIFICANT CHANGE UP (ref 135–145)
SODIUM SERPL-SCNC: 144 MMOL/L — SIGNIFICANT CHANGE UP (ref 135–145)
SPECIMEN SOURCE: SIGNIFICANT CHANGE UP
SPHEROCYTES BLD QL SMEAR: SLIGHT — SIGNIFICANT CHANGE UP
TROPONIN I, HIGH SENSITIVITY RESULT: 118.5 NG/L — HIGH
TROPONIN I, HIGH SENSITIVITY RESULT: 132.7 NG/L — HIGH
TROPONIN I, HIGH SENSITIVITY RESULT: 145.4 NG/L — HIGH
TROPONIN I, HIGH SENSITIVITY RESULT: 176.7 NG/L — HIGH
TROPONIN I, HIGH SENSITIVITY RESULT: 444.6 NG/L — HIGH
TROPONIN I, HIGH SENSITIVITY RESULT: 516.7 NG/L — HIGH
TROPONIN I, HIGH SENSITIVITY RESULT: 66.4 NG/L — SIGNIFICANT CHANGE UP
TROPONIN I, HIGH SENSITIVITY RESULT: 66.4 NG/L — SIGNIFICANT CHANGE UP
TROPONIN I, HIGH SENSITIVITY RESULT: 88.5 NG/L — HIGH
VARIANT LYMPHS # BLD: 1 % — SIGNIFICANT CHANGE UP (ref 0–6)
WBC # BLD: 1.94 K/UL — LOW (ref 3.8–10.5)
WBC # BLD: 1.94 K/UL — LOW (ref 3.8–10.5)
WBC # BLD: 2.08 K/UL — LOW (ref 3.8–10.5)
WBC # BLD: 2.14 K/UL — LOW (ref 3.8–10.5)
WBC # BLD: 2.28 K/UL — LOW (ref 3.8–10.5)
WBC # BLD: 2.28 K/UL — LOW (ref 3.8–10.5)
WBC # BLD: 2.4 K/UL — LOW (ref 3.8–10.5)
WBC # BLD: 2.57 K/UL — LOW (ref 3.8–10.5)
WBC # BLD: 2.64 K/UL — LOW (ref 3.8–10.5)
WBC # BLD: 2.7 K/UL — LOW (ref 3.8–10.5)
WBC # BLD: 2.72 K/UL — LOW (ref 3.8–10.5)
WBC # BLD: 2.72 K/UL — LOW (ref 3.8–10.5)
WBC # BLD: 2.76 K/UL — LOW (ref 3.8–10.5)
WBC # BLD: 2.76 K/UL — LOW (ref 3.8–10.5)
WBC # BLD: 2.84 K/UL — LOW (ref 3.8–10.5)
WBC # BLD: 2.84 K/UL — LOW (ref 3.8–10.5)
WBC # BLD: 2.86 K/UL — LOW (ref 3.8–10.5)
WBC # BLD: 2.9 K/UL — LOW (ref 3.8–10.5)
WBC # BLD: 2.99 K/UL — LOW (ref 3.8–10.5)
WBC # BLD: 3.02 K/UL — LOW (ref 3.8–10.5)
WBC # BLD: 3.09 K/UL — LOW (ref 3.8–10.5)
WBC # BLD: 3.09 K/UL — LOW (ref 3.8–10.5)
WBC # BLD: 3.14 K/UL — LOW (ref 3.8–10.5)
WBC # BLD: 3.16 K/UL — LOW (ref 3.8–10.5)
WBC # BLD: 3.2 K/UL — LOW (ref 3.8–10.5)
WBC # BLD: 3.2 K/UL — LOW (ref 3.8–10.5)
WBC # BLD: 3.22 K/UL — LOW (ref 3.8–10.5)
WBC # BLD: 3.3 K/UL — LOW (ref 3.8–10.5)
WBC # BLD: 3.33 K/UL — LOW (ref 3.8–10.5)
WBC # BLD: 3.38 K/UL — LOW (ref 3.8–10.5)
WBC # BLD: 3.46 K/UL — LOW (ref 3.8–10.5)
WBC # BLD: 3.47 K/UL — LOW (ref 3.8–10.5)
WBC # BLD: 3.47 K/UL — LOW (ref 3.8–10.5)
WBC # BLD: 3.52 K/UL — LOW (ref 3.8–10.5)
WBC # BLD: 3.55 K/UL — LOW (ref 3.8–10.5)
WBC # BLD: 3.55 K/UL — LOW (ref 3.8–10.5)
WBC # BLD: 3.77 K/UL — LOW (ref 3.8–10.5)
WBC # BLD: 3.9 K/UL — SIGNIFICANT CHANGE UP (ref 3.8–10.5)
WBC # BLD: 3.96 K/UL — SIGNIFICANT CHANGE UP (ref 3.8–10.5)
WBC # BLD: 3.98 K/UL — SIGNIFICANT CHANGE UP (ref 3.8–10.5)
WBC # BLD: 4.01 K/UL — SIGNIFICANT CHANGE UP (ref 3.8–10.5)
WBC # BLD: 4.01 K/UL — SIGNIFICANT CHANGE UP (ref 3.8–10.5)
WBC # BLD: 4.08 K/UL — SIGNIFICANT CHANGE UP (ref 3.8–10.5)
WBC # BLD: 4.29 K/UL — SIGNIFICANT CHANGE UP (ref 3.8–10.5)
WBC # BLD: 4.35 K/UL — SIGNIFICANT CHANGE UP (ref 3.8–10.5)
WBC # BLD: 4.38 K/UL — SIGNIFICANT CHANGE UP (ref 3.8–10.5)
WBC # BLD: 4.49 K/UL — SIGNIFICANT CHANGE UP (ref 3.8–10.5)
WBC # BLD: 4.59 K/UL — SIGNIFICANT CHANGE UP (ref 3.8–10.5)
WBC # BLD: 4.69 K/UL — SIGNIFICANT CHANGE UP (ref 3.8–10.5)
WBC # BLD: 4.87 K/UL — SIGNIFICANT CHANGE UP (ref 3.8–10.5)
WBC # BLD: 4.97 K/UL — SIGNIFICANT CHANGE UP (ref 3.8–10.5)
WBC # BLD: 5.08 K/UL — SIGNIFICANT CHANGE UP (ref 3.8–10.5)
WBC # BLD: 5.34 K/UL — SIGNIFICANT CHANGE UP (ref 3.8–10.5)
WBC # BLD: 5.57 K/UL — SIGNIFICANT CHANGE UP (ref 3.8–10.5)
WBC # FLD AUTO: 1.94 K/UL — LOW (ref 3.8–10.5)
WBC # FLD AUTO: 1.94 K/UL — LOW (ref 3.8–10.5)
WBC # FLD AUTO: 2.08 K/UL — LOW (ref 3.8–10.5)
WBC # FLD AUTO: 2.14 K/UL — LOW (ref 3.8–10.5)
WBC # FLD AUTO: 2.28 K/UL — LOW (ref 3.8–10.5)
WBC # FLD AUTO: 2.28 K/UL — LOW (ref 3.8–10.5)
WBC # FLD AUTO: 2.4 K/UL — LOW (ref 3.8–10.5)
WBC # FLD AUTO: 2.57 K/UL — LOW (ref 3.8–10.5)
WBC # FLD AUTO: 2.64 K/UL — LOW (ref 3.8–10.5)
WBC # FLD AUTO: 2.7 K/UL — LOW (ref 3.8–10.5)
WBC # FLD AUTO: 2.72 K/UL — LOW (ref 3.8–10.5)
WBC # FLD AUTO: 2.72 K/UL — LOW (ref 3.8–10.5)
WBC # FLD AUTO: 2.76 K/UL — LOW (ref 3.8–10.5)
WBC # FLD AUTO: 2.76 K/UL — LOW (ref 3.8–10.5)
WBC # FLD AUTO: 2.84 K/UL — LOW (ref 3.8–10.5)
WBC # FLD AUTO: 2.84 K/UL — LOW (ref 3.8–10.5)
WBC # FLD AUTO: 2.86 K/UL — LOW (ref 3.8–10.5)
WBC # FLD AUTO: 2.9 K/UL — LOW (ref 3.8–10.5)
WBC # FLD AUTO: 2.99 K/UL — LOW (ref 3.8–10.5)
WBC # FLD AUTO: 3.02 K/UL — LOW (ref 3.8–10.5)
WBC # FLD AUTO: 3.09 K/UL — LOW (ref 3.8–10.5)
WBC # FLD AUTO: 3.09 K/UL — LOW (ref 3.8–10.5)
WBC # FLD AUTO: 3.14 K/UL — LOW (ref 3.8–10.5)
WBC # FLD AUTO: 3.16 K/UL — LOW (ref 3.8–10.5)
WBC # FLD AUTO: 3.2 K/UL — LOW (ref 3.8–10.5)
WBC # FLD AUTO: 3.2 K/UL — LOW (ref 3.8–10.5)
WBC # FLD AUTO: 3.22 K/UL — LOW (ref 3.8–10.5)
WBC # FLD AUTO: 3.3 K/UL — LOW (ref 3.8–10.5)
WBC # FLD AUTO: 3.33 K/UL — LOW (ref 3.8–10.5)
WBC # FLD AUTO: 3.38 K/UL — LOW (ref 3.8–10.5)
WBC # FLD AUTO: 3.46 K/UL — LOW (ref 3.8–10.5)
WBC # FLD AUTO: 3.47 K/UL — LOW (ref 3.8–10.5)
WBC # FLD AUTO: 3.47 K/UL — LOW (ref 3.8–10.5)
WBC # FLD AUTO: 3.52 K/UL — LOW (ref 3.8–10.5)
WBC # FLD AUTO: 3.55 K/UL — LOW (ref 3.8–10.5)
WBC # FLD AUTO: 3.55 K/UL — LOW (ref 3.8–10.5)
WBC # FLD AUTO: 3.77 K/UL — LOW (ref 3.8–10.5)
WBC # FLD AUTO: 3.9 K/UL — SIGNIFICANT CHANGE UP (ref 3.8–10.5)
WBC # FLD AUTO: 3.96 K/UL — SIGNIFICANT CHANGE UP (ref 3.8–10.5)
WBC # FLD AUTO: 3.98 K/UL — SIGNIFICANT CHANGE UP (ref 3.8–10.5)
WBC # FLD AUTO: 4.01 K/UL — SIGNIFICANT CHANGE UP (ref 3.8–10.5)
WBC # FLD AUTO: 4.01 K/UL — SIGNIFICANT CHANGE UP (ref 3.8–10.5)
WBC # FLD AUTO: 4.08 K/UL — SIGNIFICANT CHANGE UP (ref 3.8–10.5)
WBC # FLD AUTO: 4.29 K/UL — SIGNIFICANT CHANGE UP (ref 3.8–10.5)
WBC # FLD AUTO: 4.35 K/UL — SIGNIFICANT CHANGE UP (ref 3.8–10.5)
WBC # FLD AUTO: 4.38 K/UL — SIGNIFICANT CHANGE UP (ref 3.8–10.5)
WBC # FLD AUTO: 4.49 K/UL — SIGNIFICANT CHANGE UP (ref 3.8–10.5)
WBC # FLD AUTO: 4.59 K/UL — SIGNIFICANT CHANGE UP (ref 3.8–10.5)
WBC # FLD AUTO: 4.69 K/UL — SIGNIFICANT CHANGE UP (ref 3.8–10.5)
WBC # FLD AUTO: 4.87 K/UL — SIGNIFICANT CHANGE UP (ref 3.8–10.5)
WBC # FLD AUTO: 4.97 K/UL — SIGNIFICANT CHANGE UP (ref 3.8–10.5)
WBC # FLD AUTO: 5.08 K/UL — SIGNIFICANT CHANGE UP (ref 3.8–10.5)
WBC # FLD AUTO: 5.34 K/UL — SIGNIFICANT CHANGE UP (ref 3.8–10.5)
WBC # FLD AUTO: 5.57 K/UL — SIGNIFICANT CHANGE UP (ref 3.8–10.5)

## 2023-01-01 PROCEDURE — 99497 ADVNCD CARE PLAN 30 MIN: CPT | Mod: 25

## 2023-01-01 PROCEDURE — 93306 TTE W/DOPPLER COMPLETE: CPT

## 2023-01-01 PROCEDURE — 83036 HEMOGLOBIN GLYCOSYLATED A1C: CPT

## 2023-01-01 PROCEDURE — 71045 X-RAY EXAM CHEST 1 VIEW: CPT

## 2023-01-01 PROCEDURE — 83735 ASSAY OF MAGNESIUM: CPT

## 2023-01-01 PROCEDURE — 99285 EMERGENCY DEPT VISIT HI MDM: CPT

## 2023-01-01 PROCEDURE — 71045 X-RAY EXAM CHEST 1 VIEW: CPT | Mod: 26

## 2023-01-01 PROCEDURE — 80053 COMPREHEN METABOLIC PANEL: CPT

## 2023-01-01 PROCEDURE — 85025 COMPLETE CBC W/AUTO DIFF WBC: CPT

## 2023-01-01 PROCEDURE — 86706 HEP B SURFACE ANTIBODY: CPT

## 2023-01-01 PROCEDURE — 84100 ASSAY OF PHOSPHORUS: CPT

## 2023-01-01 PROCEDURE — 84484 ASSAY OF TROPONIN QUANT: CPT

## 2023-01-01 PROCEDURE — 86140 C-REACTIVE PROTEIN: CPT

## 2023-01-01 PROCEDURE — 83880 ASSAY OF NATRIURETIC PEPTIDE: CPT

## 2023-01-01 PROCEDURE — 96375 TX/PRO/DX INJ NEW DRUG ADDON: CPT

## 2023-01-01 PROCEDURE — P9040: CPT

## 2023-01-01 PROCEDURE — 87493 C DIFF AMPLIFIED PROBE: CPT

## 2023-01-01 PROCEDURE — 85027 COMPLETE CBC AUTOMATED: CPT

## 2023-01-01 PROCEDURE — 86900 BLOOD TYPING SEROLOGIC ABO: CPT

## 2023-01-01 PROCEDURE — 87340 HEPATITIS B SURFACE AG IA: CPT

## 2023-01-01 PROCEDURE — 93922 UPR/L XTREMITY ART 2 LEVELS: CPT

## 2023-01-01 PROCEDURE — 99261: CPT

## 2023-01-01 PROCEDURE — 82553 CREATINE MB FRACTION: CPT

## 2023-01-01 PROCEDURE — 99284 EMERGENCY DEPT VISIT MOD MDM: CPT | Mod: 25

## 2023-01-01 PROCEDURE — 36415 COLL VENOUS BLD VENIPUNCTURE: CPT

## 2023-01-01 PROCEDURE — 87640 STAPH A DNA AMP PROBE: CPT

## 2023-01-01 PROCEDURE — 87507 IADNA-DNA/RNA PROBE TQ 12-25: CPT

## 2023-01-01 PROCEDURE — 93990 DOPPLER FLOW TESTING: CPT

## 2023-01-01 PROCEDURE — 96376 TX/PRO/DX INJ SAME DRUG ADON: CPT

## 2023-01-01 PROCEDURE — 80048 BASIC METABOLIC PNL TOTAL CA: CPT

## 2023-01-01 PROCEDURE — 74176 CT ABD & PELVIS W/O CONTRAST: CPT | Mod: 26

## 2023-01-01 PROCEDURE — 74176 CT ABD & PELVIS W/O CONTRAST: CPT | Mod: 26,MA

## 2023-01-01 PROCEDURE — 99222 1ST HOSP IP/OBS MODERATE 55: CPT

## 2023-01-01 PROCEDURE — 86901 BLOOD TYPING SEROLOGIC RH(D): CPT

## 2023-01-01 PROCEDURE — 74181 MRI ABDOMEN W/O CONTRAST: CPT | Mod: 26

## 2023-01-01 PROCEDURE — 74018 RADEX ABDOMEN 1 VIEW: CPT | Mod: 26

## 2023-01-01 PROCEDURE — 82310 ASSAY OF CALCIUM: CPT

## 2023-01-01 PROCEDURE — 86850 RBC ANTIBODY SCREEN: CPT

## 2023-01-01 PROCEDURE — 94640 AIRWAY INHALATION TREATMENT: CPT

## 2023-01-01 PROCEDURE — 85730 THROMBOPLASTIN TIME PARTIAL: CPT

## 2023-01-01 PROCEDURE — 87637 SARSCOV2&INF A&B&RSV AMP PRB: CPT

## 2023-01-01 PROCEDURE — 74181 MRI ABDOMEN W/O CONTRAST: CPT

## 2023-01-01 PROCEDURE — 82105 ALPHA-FETOPROTEIN SERUM: CPT

## 2023-01-01 PROCEDURE — 87045 FECES CULTURE AEROBIC BACT: CPT

## 2023-01-01 PROCEDURE — 85610 PROTHROMBIN TIME: CPT

## 2023-01-01 PROCEDURE — 99291 CRITICAL CARE FIRST HOUR: CPT | Mod: 25

## 2023-01-01 PROCEDURE — 82550 ASSAY OF CK (CPK): CPT

## 2023-01-01 PROCEDURE — 99214 OFFICE O/P EST MOD 30 MIN: CPT

## 2023-01-01 PROCEDURE — 86923 COMPATIBILITY TEST ELECTRIC: CPT

## 2023-01-01 PROCEDURE — 82962 GLUCOSE BLOOD TEST: CPT

## 2023-01-01 PROCEDURE — 78227 HEPATOBIL SYST IMAGE W/DRUG: CPT | Mod: 26

## 2023-01-01 PROCEDURE — 99254 IP/OBS CNSLTJ NEW/EST MOD 60: CPT

## 2023-01-01 PROCEDURE — 36430 TRANSFUSION BLD/BLD COMPNT: CPT

## 2023-01-01 PROCEDURE — 99283 EMERGENCY DEPT VISIT LOW MDM: CPT

## 2023-01-01 PROCEDURE — 99498 ADVNCD CARE PLAN ADDL 30 MIN: CPT | Mod: 25

## 2023-01-01 PROCEDURE — 86301 IMMUNOASSAY TUMOR CA 19-9: CPT

## 2023-01-01 PROCEDURE — 31500 INSERT EMERGENCY AIRWAY: CPT

## 2023-01-01 PROCEDURE — 36902Z: CUSTOM

## 2023-01-01 PROCEDURE — 99497 ADVNCD CARE PLAN 30 MIN: CPT

## 2023-01-01 PROCEDURE — 82009 KETONE BODYS QUAL: CPT

## 2023-01-01 PROCEDURE — 87040 BLOOD CULTURE FOR BACTERIA: CPT

## 2023-01-01 PROCEDURE — 71045 X-RAY EXAM CHEST 1 VIEW: CPT | Mod: 26,77

## 2023-01-01 PROCEDURE — 99232 SBSQ HOSP IP/OBS MODERATE 35: CPT

## 2023-01-01 PROCEDURE — 99285 EMERGENCY DEPT VISIT HI MDM: CPT | Mod: 25

## 2023-01-01 PROCEDURE — 83970 ASSAY OF PARATHORMONE: CPT

## 2023-01-01 PROCEDURE — 0225U NFCT DS DNA&RNA 21 SARSCOV2: CPT

## 2023-01-01 PROCEDURE — 93005 ELECTROCARDIOGRAM TRACING: CPT

## 2023-01-01 PROCEDURE — 83690 ASSAY OF LIPASE: CPT

## 2023-01-01 PROCEDURE — 87635 SARS-COV-2 COVID-19 AMP PRB: CPT

## 2023-01-01 PROCEDURE — 74176 CT ABD & PELVIS W/O CONTRAST: CPT

## 2023-01-01 PROCEDURE — 97116 GAIT TRAINING THERAPY: CPT

## 2023-01-01 PROCEDURE — 74018 RADEX ABDOMEN 1 VIEW: CPT

## 2023-01-01 PROCEDURE — 99231 SBSQ HOSP IP/OBS SF/LOW 25: CPT

## 2023-01-01 PROCEDURE — 90792 PSYCH DIAG EVAL W/MED SRVCS: CPT

## 2023-01-01 PROCEDURE — 99291 CRITICAL CARE FIRST HOUR: CPT

## 2023-01-01 PROCEDURE — 84145 PROCALCITONIN (PCT): CPT

## 2023-01-01 PROCEDURE — 87177 OVA AND PARASITES SMEARS: CPT

## 2023-01-01 PROCEDURE — 99233 SBSQ HOSP IP/OBS HIGH 50: CPT

## 2023-01-01 PROCEDURE — 83605 ASSAY OF LACTIC ACID: CPT

## 2023-01-01 PROCEDURE — 71250 CT THORAX DX C-: CPT | Mod: 26

## 2023-01-01 PROCEDURE — 84132 ASSAY OF SERUM POTASSIUM: CPT

## 2023-01-01 PROCEDURE — 84702 CHORIONIC GONADOTROPIN TEST: CPT

## 2023-01-01 PROCEDURE — 87641 MR-STAPH DNA AMP PROBE: CPT

## 2023-01-01 PROCEDURE — 99282 EMERGENCY DEPT VISIT SF MDM: CPT

## 2023-01-01 PROCEDURE — 80074 ACUTE HEPATITIS PANEL: CPT

## 2023-01-01 PROCEDURE — 93306 TTE W/DOPPLER COMPLETE: CPT | Mod: 26

## 2023-01-01 PROCEDURE — 97161 PT EVAL LOW COMPLEX 20 MIN: CPT

## 2023-01-01 PROCEDURE — 36907Z: CUSTOM | Mod: 59

## 2023-01-01 PROCEDURE — 74176 CT ABD & PELVIS W/O CONTRAST: CPT | Mod: MA

## 2023-01-01 PROCEDURE — 93010 ELECTROCARDIOGRAM REPORT: CPT

## 2023-01-01 PROCEDURE — 96374 THER/PROPH/DIAG INJ IV PUSH: CPT

## 2023-01-01 PROCEDURE — 82803 BLOOD GASES ANY COMBINATION: CPT

## 2023-01-01 PROCEDURE — 97530 THERAPEUTIC ACTIVITIES: CPT

## 2023-01-01 PROCEDURE — 78227 HEPATOBIL SYST IMAGE W/DRUG: CPT

## 2023-01-01 PROCEDURE — 87046 STOOL CULTR AEROBIC BACT EA: CPT

## 2023-01-01 PROCEDURE — 97110 THERAPEUTIC EXERCISES: CPT

## 2023-01-01 PROCEDURE — 99223 1ST HOSP IP/OBS HIGH 75: CPT

## 2023-01-01 PROCEDURE — 82378 CARCINOEMBRYONIC ANTIGEN: CPT

## 2023-01-01 PROCEDURE — 82330 ASSAY OF CALCIUM: CPT

## 2023-01-01 PROCEDURE — 84295 ASSAY OF SERUM SODIUM: CPT

## 2023-01-01 PROCEDURE — 71250 CT THORAX DX C-: CPT

## 2023-01-01 RX ORDER — ONDANSETRON 8 MG/1
4 TABLET, FILM COATED ORAL EVERY 6 HOURS
Refills: 0 | Status: DISCONTINUED | OUTPATIENT
Start: 2023-01-01 | End: 2023-01-01

## 2023-01-01 RX ORDER — CIPROFLOXACIN LACTATE 400MG/40ML
500 VIAL (ML) INTRAVENOUS EVERY 24 HOURS
Refills: 0 | Status: DISCONTINUED | OUTPATIENT
Start: 2023-01-01 | End: 2023-01-01

## 2023-01-01 RX ORDER — ERYTHROPOIETIN 10000 [IU]/ML
20000 INJECTION, SOLUTION INTRAVENOUS; SUBCUTANEOUS
Refills: 0 | Status: DISCONTINUED | OUTPATIENT
Start: 2023-01-01 | End: 2023-01-01

## 2023-01-01 RX ORDER — IPRATROPIUM/ALBUTEROL SULFATE 18-103MCG
3 AEROSOL WITH ADAPTER (GRAM) INHALATION ONCE
Refills: 0 | Status: COMPLETED | OUTPATIENT
Start: 2023-01-01 | End: 2023-01-01

## 2023-01-01 RX ORDER — SUCRALFATE 1 G
1 TABLET ORAL
Refills: 0 | Status: DISCONTINUED | OUTPATIENT
Start: 2023-01-01 | End: 2023-01-01

## 2023-01-01 RX ORDER — FUROSEMIDE 40 MG
80 TABLET ORAL DAILY
Refills: 0 | Status: DISCONTINUED | OUTPATIENT
Start: 2023-01-01 | End: 2023-01-01

## 2023-01-01 RX ORDER — METOPROLOL TARTRATE 50 MG
25 TABLET ORAL
Refills: 0 | Status: DISCONTINUED | OUTPATIENT
Start: 2023-01-01 | End: 2023-01-01

## 2023-01-01 RX ORDER — DEXTROSE 50 % IN WATER 50 %
25 SYRINGE (ML) INTRAVENOUS ONCE
Refills: 0 | Status: DISCONTINUED | OUTPATIENT
Start: 2023-01-01 | End: 2023-01-01

## 2023-01-01 RX ORDER — INSULIN LISPRO 100/ML
VIAL (ML) SUBCUTANEOUS
Refills: 0 | Status: DISCONTINUED | OUTPATIENT
Start: 2023-01-01 | End: 2023-01-01

## 2023-01-01 RX ORDER — AMLODIPINE BESYLATE 2.5 MG/1
1 TABLET ORAL
Refills: 0 | DISCHARGE

## 2023-01-01 RX ORDER — PANTOPRAZOLE SODIUM 20 MG/1
40 TABLET, DELAYED RELEASE ORAL
Refills: 0 | Status: DISCONTINUED | OUTPATIENT
Start: 2023-01-01 | End: 2023-01-01

## 2023-01-01 RX ORDER — ONDANSETRON 8 MG/1
4 TABLET, FILM COATED ORAL ONCE
Refills: 0 | Status: DISCONTINUED | OUTPATIENT
Start: 2023-01-01 | End: 2023-01-01

## 2023-01-01 RX ORDER — METOPROLOL TARTRATE 50 MG
1 TABLET ORAL
Qty: 0 | Refills: 0 | DISCHARGE
Start: 2023-01-01

## 2023-01-01 RX ORDER — OXYCODONE HYDROCHLORIDE 5 MG/1
1 TABLET ORAL
Qty: 0 | Refills: 0 | DISCHARGE

## 2023-01-01 RX ORDER — PANTOPRAZOLE SODIUM 20 MG/1
40 TABLET, DELAYED RELEASE ORAL EVERY 12 HOURS
Refills: 0 | Status: DISCONTINUED | OUTPATIENT
Start: 2023-01-01 | End: 2023-01-01

## 2023-01-01 RX ORDER — ACETAMINOPHEN 500 MG
650 TABLET ORAL ONCE
Refills: 0 | Status: COMPLETED | OUTPATIENT
Start: 2023-01-01 | End: 2023-01-01

## 2023-01-01 RX ORDER — ZOLPIDEM TARTRATE 10 MG/1
5 TABLET ORAL AT BEDTIME
Refills: 0 | Status: DISCONTINUED | OUTPATIENT
Start: 2023-01-01 | End: 2023-01-01

## 2023-01-01 RX ORDER — FUROSEMIDE 40 MG
40 TABLET ORAL DAILY
Refills: 0 | Status: DISCONTINUED | OUTPATIENT
Start: 2023-01-01 | End: 2023-01-01

## 2023-01-01 RX ORDER — HUMAN INSULIN 100 [IU]/ML
100 INJECTION, SOLUTION SUBCUTANEOUS
Refills: 0 | Status: DISCONTINUED | COMMUNITY
End: 2023-01-01

## 2023-01-01 RX ORDER — NICOTINE POLACRILEX 2 MG
1 GUM BUCCAL DAILY
Refills: 0 | Status: DISCONTINUED | OUTPATIENT
Start: 2023-01-01 | End: 2023-01-01

## 2023-01-01 RX ORDER — ALPRAZOLAM 0.25 MG
0.25 TABLET ORAL AT BEDTIME
Refills: 0 | Status: DISCONTINUED | OUTPATIENT
Start: 2023-01-01 | End: 2023-01-01

## 2023-01-01 RX ORDER — FUROSEMIDE 40 MG
1 TABLET ORAL
Qty: 0 | Refills: 0 | DISCHARGE
Start: 2023-01-01

## 2023-01-01 RX ORDER — DEXTROSE 50 % IN WATER 50 %
15 SYRINGE (ML) INTRAVENOUS ONCE
Refills: 0 | Status: DISCONTINUED | OUTPATIENT
Start: 2023-01-01 | End: 2023-01-01

## 2023-01-01 RX ORDER — ACETAMINOPHEN 500 MG
2 TABLET ORAL
Qty: 0 | Refills: 0 | DISCHARGE
Start: 2023-01-01

## 2023-01-01 RX ORDER — LEVOTHYROXINE SODIUM 125 MCG
25 TABLET ORAL DAILY
Refills: 0 | Status: DISCONTINUED | OUTPATIENT
Start: 2023-01-01 | End: 2023-01-01

## 2023-01-01 RX ORDER — ALBUTEROL 90 UG/1
2.5 AEROSOL, METERED ORAL ONCE
Refills: 0 | Status: COMPLETED | OUTPATIENT
Start: 2023-01-01 | End: 2023-01-01

## 2023-01-01 RX ORDER — ALBUTEROL 90 UG/1
2 AEROSOL, METERED ORAL EVERY 6 HOURS
Refills: 0 | Status: DISCONTINUED | OUTPATIENT
Start: 2023-01-01 | End: 2023-01-01

## 2023-01-01 RX ORDER — DEXTROSE 50 % IN WATER 50 %
25 SYRINGE (ML) INTRAVENOUS ONCE
Refills: 0 | Status: COMPLETED | OUTPATIENT
Start: 2023-01-01 | End: 2023-01-01

## 2023-01-01 RX ORDER — INSULIN HUMAN 100 [IU]/ML
5 INJECTION, SOLUTION SUBCUTANEOUS ONCE
Refills: 0 | Status: COMPLETED | OUTPATIENT
Start: 2023-01-01 | End: 2023-01-01

## 2023-01-01 RX ORDER — PREGABALIN 225 MG/1
1 CAPSULE ORAL
Qty: 0 | Refills: 0 | DISCHARGE
Start: 2023-01-01

## 2023-01-01 RX ORDER — AMLODIPINE BESYLATE 10 MG/1
10 TABLET ORAL
Refills: 0 | Status: ACTIVE | COMMUNITY

## 2023-01-01 RX ORDER — FERROUS SULFATE 325(65) MG
325 TABLET ORAL DAILY
Refills: 0 | Status: DISCONTINUED | OUTPATIENT
Start: 2023-01-01 | End: 2023-01-01

## 2023-01-01 RX ORDER — IPRATROPIUM/ALBUTEROL SULFATE 18-103MCG
3 AEROSOL WITH ADAPTER (GRAM) INHALATION EVERY 8 HOURS
Refills: 0 | Status: DISCONTINUED | OUTPATIENT
Start: 2023-01-01 | End: 2023-01-01

## 2023-01-01 RX ORDER — SEVELAMER CARBONATE 2400 MG/1
800 POWDER, FOR SUSPENSION ORAL
Refills: 0 | Status: DISCONTINUED | OUTPATIENT
Start: 2023-01-01 | End: 2023-01-01

## 2023-01-01 RX ORDER — AMLODIPINE BESYLATE 2.5 MG/1
1 TABLET ORAL
Qty: 0 | Refills: 0 | DISCHARGE
Start: 2023-01-01

## 2023-01-01 RX ORDER — IPRATROPIUM/ALBUTEROL SULFATE 18-103MCG
3 AEROSOL WITH ADAPTER (GRAM) INHALATION EVERY 6 HOURS
Refills: 0 | Status: DISCONTINUED | OUTPATIENT
Start: 2023-01-01 | End: 2023-01-01

## 2023-01-01 RX ORDER — IPRATROPIUM/ALBUTEROL SULFATE 18-103MCG
3 AEROSOL WITH ADAPTER (GRAM) INHALATION
Qty: 0 | Refills: 0 | DISCHARGE

## 2023-01-01 RX ORDER — SODIUM ZIRCONIUM CYCLOSILICATE 10 G/10G
10 POWDER, FOR SUSPENSION ORAL ONCE
Refills: 0 | Status: COMPLETED | OUTPATIENT
Start: 2023-01-01 | End: 2023-01-01

## 2023-01-01 RX ORDER — LOPERAMIDE HCL 2 MG
2 TABLET ORAL ONCE
Refills: 0 | Status: COMPLETED | OUTPATIENT
Start: 2023-01-01 | End: 2023-01-01

## 2023-01-01 RX ORDER — OXYCODONE HYDROCHLORIDE 5 MG/1
2.5 TABLET ORAL ONCE
Refills: 0 | Status: DISCONTINUED | OUTPATIENT
Start: 2023-01-01 | End: 2023-01-01

## 2023-01-01 RX ORDER — ROSUVASTATIN CALCIUM 5 MG/1
1 TABLET ORAL
Refills: 0 | DISCHARGE

## 2023-01-01 RX ORDER — CEFEPIME 1 G/1
1000 INJECTION, POWDER, FOR SOLUTION INTRAMUSCULAR; INTRAVENOUS EVERY 24 HOURS
Refills: 0 | Status: COMPLETED | OUTPATIENT
Start: 2023-01-01 | End: 2023-01-01

## 2023-01-01 RX ORDER — ASPIRIN/CALCIUM CARB/MAGNESIUM 324 MG
81 TABLET ORAL DAILY
Refills: 0 | Status: DISCONTINUED | OUTPATIENT
Start: 2023-01-01 | End: 2023-01-01

## 2023-01-01 RX ORDER — LEVOTHYROXINE SODIUM 125 MCG
1 TABLET ORAL
Refills: 0 | DISCHARGE

## 2023-01-01 RX ORDER — CEFTRIAXONE 500 MG/1
1000 INJECTION, POWDER, FOR SOLUTION INTRAMUSCULAR; INTRAVENOUS ONCE
Refills: 0 | Status: DISCONTINUED | OUTPATIENT
Start: 2023-01-01 | End: 2023-01-01

## 2023-01-01 RX ORDER — PANTOPRAZOLE SODIUM 20 MG/1
80 TABLET, DELAYED RELEASE ORAL ONCE
Refills: 0 | Status: COMPLETED | OUTPATIENT
Start: 2023-01-01 | End: 2023-01-01

## 2023-01-01 RX ORDER — HYDRALAZINE HCL 50 MG
25 TABLET ORAL THREE TIMES A DAY
Refills: 0 | Status: DISCONTINUED | OUTPATIENT
Start: 2023-01-01 | End: 2023-01-01

## 2023-01-01 RX ORDER — ALPRAZOLAM 0.25 MG
1 TABLET ORAL
Refills: 0 | DISCHARGE

## 2023-01-01 RX ORDER — METOCLOPRAMIDE HCL 10 MG
10 TABLET ORAL THREE TIMES A DAY
Refills: 0 | Status: DISCONTINUED | OUTPATIENT
Start: 2023-01-01 | End: 2023-01-01

## 2023-01-01 RX ORDER — METOPROLOL TARTRATE 50 MG
1 TABLET ORAL
Qty: 0 | Refills: 0 | DISCHARGE

## 2023-01-01 RX ORDER — INSULIN LISPRO 100/ML
4 VIAL (ML) SUBCUTANEOUS ONCE
Refills: 0 | Status: COMPLETED | OUTPATIENT
Start: 2023-01-01 | End: 2023-01-01

## 2023-01-01 RX ORDER — METOPROLOL TARTRATE 50 MG
5 TABLET ORAL EVERY 12 HOURS
Refills: 0 | Status: DISCONTINUED | OUTPATIENT
Start: 2023-01-01 | End: 2023-01-01

## 2023-01-01 RX ORDER — HEPARIN SODIUM 5000 [USP'U]/ML
5000 INJECTION INTRAVENOUS; SUBCUTANEOUS EVERY 12 HOURS
Refills: 0 | Status: DISCONTINUED | OUTPATIENT
Start: 2023-01-01 | End: 2023-01-01

## 2023-01-01 RX ORDER — ONDANSETRON 8 MG/1
4 TABLET, FILM COATED ORAL ONCE
Refills: 0 | Status: COMPLETED | OUTPATIENT
Start: 2023-01-01 | End: 2023-01-01

## 2023-01-01 RX ORDER — CALCIUM CARBONATE 500(1250)
1 TABLET ORAL
Refills: 0 | Status: DISCONTINUED | OUTPATIENT
Start: 2023-01-01 | End: 2023-01-01

## 2023-01-01 RX ORDER — ATORVASTATIN CALCIUM 80 MG/1
40 TABLET, FILM COATED ORAL AT BEDTIME
Refills: 0 | Status: DISCONTINUED | OUTPATIENT
Start: 2023-01-01 | End: 2023-01-01

## 2023-01-01 RX ORDER — CHOLECALCIFEROL (VITAMIN D3) 125 MCG
1000 CAPSULE ORAL
Qty: 0 | Refills: 0 | DISCHARGE
Start: 2023-01-01

## 2023-01-01 RX ORDER — SERTRALINE 25 MG/1
2 TABLET, FILM COATED ORAL
Qty: 0 | Refills: 0 | DISCHARGE
Start: 2023-01-01

## 2023-01-01 RX ORDER — MUPIROCIN 20 MG/G
1 OINTMENT TOPICAL
Refills: 0 | DISCHARGE

## 2023-01-01 RX ORDER — BUDESONIDE AND FORMOTEROL FUMARATE DIHYDRATE 160; 4.5 UG/1; UG/1
2 AEROSOL RESPIRATORY (INHALATION)
Qty: 0 | Refills: 0 | DISCHARGE
Start: 2023-01-01

## 2023-01-01 RX ORDER — HYDRALAZINE HCL 50 MG
1 TABLET ORAL
Qty: 0 | Refills: 0 | DISCHARGE
Start: 2023-01-01

## 2023-01-01 RX ORDER — ALPRAZOLAM 0.25 MG/1
0.25 TABLET ORAL
Refills: 0 | Status: ACTIVE | COMMUNITY

## 2023-01-01 RX ORDER — METOPROLOL TARTRATE 50 MG
1 TABLET ORAL
Refills: 0 | DISCHARGE

## 2023-01-01 RX ORDER — LANOLIN ALCOHOL/MO/W.PET/CERES
3 CREAM (GRAM) TOPICAL AT BEDTIME
Refills: 0 | Status: DISCONTINUED | OUTPATIENT
Start: 2023-01-01 | End: 2023-01-01

## 2023-01-01 RX ORDER — SODIUM ZIRCONIUM CYCLOSILICATE 10 G/10G
10 POWDER, FOR SUSPENSION ORAL
Refills: 0 | Status: DISCONTINUED | OUTPATIENT
Start: 2023-01-01 | End: 2023-01-01

## 2023-01-01 RX ORDER — AZITHROMYCIN 500 MG/1
TABLET, FILM COATED ORAL
Refills: 0 | Status: DISCONTINUED | OUTPATIENT
Start: 2023-01-01 | End: 2023-01-01

## 2023-01-01 RX ORDER — LATANOPROST 0.05 MG/ML
1 SOLUTION/ DROPS OPHTHALMIC; TOPICAL
Qty: 0 | Refills: 0 | DISCHARGE
Start: 2023-01-01

## 2023-01-01 RX ORDER — TIOTROPIUM BROMIDE 18 UG/1
2 CAPSULE ORAL; RESPIRATORY (INHALATION)
Qty: 0 | Refills: 0 | DISCHARGE
Start: 2023-01-01

## 2023-01-01 RX ORDER — AMLODIPINE BESYLATE 2.5 MG/1
10 TABLET ORAL DAILY
Refills: 0 | Status: DISCONTINUED | OUTPATIENT
Start: 2023-01-01 | End: 2023-01-01

## 2023-01-01 RX ORDER — METRONIDAZOLE 500 MG
500 TABLET ORAL EVERY 8 HOURS
Refills: 0 | Status: DISCONTINUED | OUTPATIENT
Start: 2023-01-01 | End: 2023-01-01

## 2023-01-01 RX ORDER — ACETAMINOPHEN 500 MG
1000 TABLET ORAL ONCE
Refills: 0 | Status: COMPLETED | OUTPATIENT
Start: 2023-01-01 | End: 2023-01-01

## 2023-01-01 RX ORDER — IPRATROPIUM/ALBUTEROL SULFATE 18-103MCG
3 AEROSOL WITH ADAPTER (GRAM) INHALATION
Qty: 0 | Refills: 0 | DISCHARGE
Start: 2023-01-01

## 2023-01-01 RX ORDER — INSULIN LISPRO 100/ML
1 VIAL (ML) SUBCUTANEOUS
Refills: 0 | Status: DISCONTINUED | OUTPATIENT
Start: 2023-01-01 | End: 2023-01-01

## 2023-01-01 RX ORDER — LATANOPROST 0.05 MG/ML
1 SOLUTION/ DROPS OPHTHALMIC; TOPICAL AT BEDTIME
Refills: 0 | Status: DISCONTINUED | OUTPATIENT
Start: 2023-01-01 | End: 2023-01-01

## 2023-01-01 RX ORDER — LEVOTHYROXINE SODIUM 25 UG/1
25 TABLET ORAL
Refills: 0 | Status: DISCONTINUED | COMMUNITY
End: 2023-01-01

## 2023-01-01 RX ORDER — GLUCAGON INJECTION, SOLUTION 0.5 MG/.1ML
1 INJECTION, SOLUTION SUBCUTANEOUS ONCE
Refills: 0 | Status: DISCONTINUED | OUTPATIENT
Start: 2023-01-01 | End: 2023-01-01

## 2023-01-01 RX ORDER — LIDOCAINE 4 G/100G
1 CREAM TOPICAL
Qty: 0 | Refills: 0 | DISCHARGE

## 2023-01-01 RX ORDER — DEXTROSE 50 % IN WATER 50 %
12.5 SYRINGE (ML) INTRAVENOUS ONCE
Refills: 0 | Status: COMPLETED | OUTPATIENT
Start: 2023-01-01 | End: 2023-01-01

## 2023-01-01 RX ORDER — INSULIN LISPRO 100/ML
VIAL (ML) SUBCUTANEOUS EVERY 6 HOURS
Refills: 0 | Status: DISCONTINUED | OUTPATIENT
Start: 2023-01-01 | End: 2023-01-01

## 2023-01-01 RX ORDER — LANOLIN ALCOHOL/MO/W.PET/CERES
5 CREAM (GRAM) TOPICAL AT BEDTIME
Refills: 0 | Status: DISCONTINUED | OUTPATIENT
Start: 2023-01-01 | End: 2023-01-01

## 2023-01-01 RX ORDER — INSULIN LISPRO 100/ML
VIAL (ML) SUBCUTANEOUS AT BEDTIME
Refills: 0 | Status: DISCONTINUED | OUTPATIENT
Start: 2023-01-01 | End: 2023-01-01

## 2023-01-01 RX ORDER — METOPROLOL TARTRATE 50 MG
5 TABLET ORAL ONCE
Refills: 0 | Status: DISCONTINUED | OUTPATIENT
Start: 2023-01-01 | End: 2023-01-01

## 2023-01-01 RX ORDER — CALCIUM CARBONATE 500(1250)
1 TABLET ORAL
Qty: 0 | Refills: 0 | DISCHARGE
Start: 2023-01-01

## 2023-01-01 RX ORDER — CHLORHEXIDINE GLUCONATE 213 G/1000ML
1 SOLUTION TOPICAL
Refills: 0 | Status: DISCONTINUED | OUTPATIENT
Start: 2023-01-01 | End: 2023-01-01

## 2023-01-01 RX ORDER — FOLIC ACID 0.8 MG
1 TABLET ORAL DAILY
Refills: 0 | Status: DISCONTINUED | OUTPATIENT
Start: 2023-01-01 | End: 2023-01-01

## 2023-01-01 RX ORDER — TIOTROPIUM BROMIDE 18 UG/1
2 CAPSULE ORAL; RESPIRATORY (INHALATION) DAILY
Refills: 0 | Status: DISCONTINUED | OUTPATIENT
Start: 2023-01-01 | End: 2023-01-01

## 2023-01-01 RX ORDER — BUDESONIDE AND FORMOTEROL FUMARATE DIHYDRATE 160; 4.5 UG/1; UG/1
2 AEROSOL RESPIRATORY (INHALATION)
Refills: 0 | Status: DISCONTINUED | OUTPATIENT
Start: 2023-01-01 | End: 2023-01-01

## 2023-01-01 RX ORDER — ETOMIDATE 2 MG/ML
20 INJECTION INTRAVENOUS ONCE
Refills: 0 | Status: COMPLETED | OUTPATIENT
Start: 2023-01-01 | End: 2023-01-01

## 2023-01-01 RX ORDER — SERTRALINE 25 MG/1
100 TABLET, FILM COATED ORAL DAILY
Refills: 0 | Status: DISCONTINUED | OUTPATIENT
Start: 2023-01-01 | End: 2023-01-01

## 2023-01-01 RX ORDER — NICOTINE POLACRILEX 2 MG
1 GUM BUCCAL
Qty: 0 | Refills: 0 | DISCHARGE
Start: 2023-01-01

## 2023-01-01 RX ORDER — FUROSEMIDE 40 MG
1 TABLET ORAL
Qty: 0 | Refills: 0 | DISCHARGE

## 2023-01-01 RX ORDER — METOPROLOL TARTRATE 50 MG
12.5 TABLET ORAL
Refills: 0 | Status: DISCONTINUED | OUTPATIENT
Start: 2023-01-01 | End: 2023-01-01

## 2023-01-01 RX ORDER — CHOLECALCIFEROL (VITAMIN D3) 125 MCG
1000 CAPSULE ORAL DAILY
Refills: 0 | Status: DISCONTINUED | OUTPATIENT
Start: 2023-01-01 | End: 2023-01-01

## 2023-01-01 RX ORDER — ACETAMINOPHEN 500 MG
650 TABLET ORAL EVERY 6 HOURS
Refills: 0 | Status: DISCONTINUED | OUTPATIENT
Start: 2023-01-01 | End: 2023-01-01

## 2023-01-01 RX ORDER — SEVELAMER CARBONATE 2400 MG/1
1 POWDER, FOR SUSPENSION ORAL
Qty: 0 | Refills: 0 | DISCHARGE
Start: 2023-01-01

## 2023-01-01 RX ORDER — INSULIN GLARGINE 100 [IU]/ML
4 INJECTION, SOLUTION SUBCUTANEOUS AT BEDTIME
Refills: 0 | Status: DISCONTINUED | OUTPATIENT
Start: 2023-01-01 | End: 2023-01-01

## 2023-01-01 RX ORDER — METOPROLOL TARTRATE 50 MG
50 TABLET ORAL DAILY
Refills: 0 | Status: DISCONTINUED | OUTPATIENT
Start: 2023-01-01 | End: 2023-01-01

## 2023-01-01 RX ORDER — ONDANSETRON 8 MG/1
1 TABLET, FILM COATED ORAL
Qty: 0 | Refills: 0 | DISCHARGE

## 2023-01-01 RX ORDER — CHLORHEXIDINE GLUCONATE 213 G/1000ML
1 SOLUTION TOPICAL DAILY
Refills: 0 | Status: DISCONTINUED | OUTPATIENT
Start: 2023-01-01 | End: 2023-01-01

## 2023-01-01 RX ORDER — HALOPERIDOL DECANOATE 100 MG/ML
2 INJECTION INTRAMUSCULAR ONCE
Refills: 0 | Status: COMPLETED | OUTPATIENT
Start: 2023-01-01 | End: 2023-01-01

## 2023-01-01 RX ORDER — ONDANSETRON 8 MG/1
4 TABLET, FILM COATED ORAL EVERY 8 HOURS
Refills: 0 | Status: DISCONTINUED | OUTPATIENT
Start: 2023-01-01 | End: 2023-01-01

## 2023-01-01 RX ORDER — OXYCODONE AND ACETAMINOPHEN 5; 325 MG/1; MG/1
5-325 TABLET ORAL
Refills: 0 | Status: ACTIVE | COMMUNITY

## 2023-01-01 RX ORDER — NOREPINEPHRINE BITARTRATE/D5W 8 MG/250ML
0.05 PLASTIC BAG, INJECTION (ML) INTRAVENOUS
Qty: 8 | Refills: 0 | Status: DISCONTINUED | OUTPATIENT
Start: 2023-01-01 | End: 2023-01-01

## 2023-01-01 RX ORDER — LEVOTHYROXINE SODIUM 125 MCG
1 TABLET ORAL
Qty: 0 | Refills: 0 | DISCHARGE
Start: 2023-01-01

## 2023-01-01 RX ORDER — LEVOTHYROXINE SODIUM 125 MCG
1 TABLET ORAL
Qty: 0 | Refills: 0 | DISCHARGE

## 2023-01-01 RX ORDER — ASPIRIN/CALCIUM CARB/MAGNESIUM 324 MG
1 TABLET ORAL
Qty: 0 | Refills: 0 | DISCHARGE

## 2023-01-01 RX ORDER — METOLAZONE 5 MG/1
1 TABLET ORAL
Refills: 0 | DISCHARGE

## 2023-01-01 RX ORDER — SEVELAMER CARBONATE 2400 MG/1
800 POWDER, FOR SUSPENSION ORAL THREE TIMES A DAY
Refills: 0 | Status: COMPLETED | OUTPATIENT
Start: 2023-01-01 | End: 2023-01-01

## 2023-01-01 RX ORDER — SUCRALFATE 1 G/10ML
1 SUSPENSION ORAL
Refills: 0 | Status: ACTIVE | COMMUNITY

## 2023-01-01 RX ORDER — METOCLOPRAMIDE HCL 10 MG
1 TABLET ORAL
Qty: 0 | Refills: 0 | DISCHARGE
Start: 2023-01-01

## 2023-01-01 RX ORDER — ROSUVASTATIN CALCIUM 40 MG/1
40 TABLET, FILM COATED ORAL
Refills: 0 | Status: ACTIVE | COMMUNITY

## 2023-01-01 RX ORDER — METOCLOPRAMIDE HCL 10 MG
5 TABLET ORAL ONCE
Refills: 0 | Status: COMPLETED | OUTPATIENT
Start: 2023-01-01 | End: 2023-01-01

## 2023-01-01 RX ORDER — METRONIDAZOLE 500 MG
1 TABLET ORAL
Qty: 18 | Refills: 0
Start: 2023-01-01 | End: 2023-01-01

## 2023-01-01 RX ORDER — MIDAZOLAM HYDROCHLORIDE 1 MG/ML
2.5 INJECTION, SOLUTION INTRAMUSCULAR; INTRAVENOUS ONCE
Refills: 0 | Status: DISCONTINUED | OUTPATIENT
Start: 2023-01-01 | End: 2023-01-01

## 2023-01-01 RX ORDER — SODIUM CHLORIDE 9 MG/ML
1000 INJECTION, SOLUTION INTRAVENOUS
Refills: 0 | Status: DISCONTINUED | OUTPATIENT
Start: 2023-01-01 | End: 2023-01-01

## 2023-01-01 RX ORDER — CHLORHEXIDINE GLUCONATE 213 G/1000ML
15 SOLUTION TOPICAL EVERY 12 HOURS
Refills: 0 | Status: DISCONTINUED | OUTPATIENT
Start: 2023-01-01 | End: 2023-01-01

## 2023-01-01 RX ORDER — METOPROLOL TARTRATE 50 MG
25 TABLET ORAL DAILY
Refills: 0 | Status: DISCONTINUED | OUTPATIENT
Start: 2023-01-01 | End: 2023-01-01

## 2023-01-01 RX ORDER — HYDRALAZINE HCL 50 MG
5 TABLET ORAL THREE TIMES A DAY
Refills: 0 | Status: DISCONTINUED | OUTPATIENT
Start: 2023-01-01 | End: 2023-01-01

## 2023-01-01 RX ORDER — CALCIUM CARBONATE 500(1250)
1 TABLET ORAL
Qty: 0 | Refills: 0 | DISCHARGE

## 2023-01-01 RX ORDER — PREGABALIN 225 MG/1
1 CAPSULE ORAL
Refills: 0 | DISCHARGE

## 2023-01-01 RX ORDER — METOLAZONE 5 MG/1
1 TABLET ORAL
Qty: 0 | Refills: 0 | DISCHARGE
Start: 2023-01-01

## 2023-01-01 RX ORDER — HYDRALAZINE HCL 50 MG
1 TABLET ORAL
Qty: 0 | Refills: 0 | DISCHARGE

## 2023-01-01 RX ORDER — SERTRALINE 25 MG/1
200 TABLET, FILM COATED ORAL DAILY
Refills: 0 | Status: DISCONTINUED | OUTPATIENT
Start: 2023-01-01 | End: 2023-01-01

## 2023-01-01 RX ORDER — LEVOTHYROXINE SODIUM 125 MCG
18 TABLET ORAL AT BEDTIME
Refills: 0 | Status: DISCONTINUED | OUTPATIENT
Start: 2023-01-01 | End: 2023-01-01

## 2023-01-01 RX ORDER — SUCRALFATE 1 G
1 TABLET ORAL EVERY 6 HOURS
Refills: 0 | Status: DISCONTINUED | OUTPATIENT
Start: 2023-01-01 | End: 2023-01-01

## 2023-01-01 RX ORDER — FUROSEMIDE 40 MG
60 TABLET ORAL ONCE
Refills: 0 | Status: COMPLETED | OUTPATIENT
Start: 2023-01-01 | End: 2023-01-01

## 2023-01-01 RX ORDER — LIDOCAINE 4 G/100G
1 CREAM TOPICAL DAILY
Refills: 0 | Status: DISCONTINUED | OUTPATIENT
Start: 2023-01-01 | End: 2023-01-01

## 2023-01-01 RX ORDER — HEPARIN SODIUM 5000 [USP'U]/ML
5000 INJECTION INTRAVENOUS; SUBCUTANEOUS EVERY 8 HOURS
Refills: 0 | Status: DISCONTINUED | OUTPATIENT
Start: 2023-01-01 | End: 2023-01-01

## 2023-01-01 RX ORDER — DEXTROSE 50 % IN WATER 50 %
15 SYRINGE (ML) INTRAVENOUS ONCE
Refills: 0 | Status: COMPLETED | OUTPATIENT
Start: 2023-01-01 | End: 2023-01-01

## 2023-01-01 RX ORDER — CALCIUM CARBONATE 500(1250)
1 TABLET ORAL DAILY
Refills: 0 | Status: DISCONTINUED | OUTPATIENT
Start: 2023-01-01 | End: 2023-01-01

## 2023-01-01 RX ORDER — LEVOTHYROXINE SODIUM 0.17 MG/1
TABLET ORAL
Refills: 0 | Status: ACTIVE | COMMUNITY

## 2023-01-01 RX ORDER — ERYTHROPOIETIN 10000 [IU]/ML
10000 INJECTION, SOLUTION INTRAVENOUS; SUBCUTANEOUS
Refills: 0 | Status: DISCONTINUED | OUTPATIENT
Start: 2023-01-01 | End: 2023-01-01

## 2023-01-01 RX ORDER — INSULIN DETEMIR 100/ML (3)
4 INSULIN PEN (ML) SUBCUTANEOUS
Qty: 0 | Refills: 0 | DISCHARGE

## 2023-01-01 RX ORDER — FOLIC ACID 0.8 MG
1 TABLET ORAL
Qty: 0 | Refills: 0 | DISCHARGE
Start: 2023-01-01

## 2023-01-01 RX ORDER — IPRATROPIUM/ALBUTEROL SULFATE 18-103MCG
3 AEROSOL WITH ADAPTER (GRAM) INHALATION
Refills: 0 | Status: COMPLETED | OUTPATIENT
Start: 2023-01-01 | End: 2023-01-01

## 2023-01-01 RX ORDER — OXYCODONE HYDROCHLORIDE 5 MG/1
5 TABLET ORAL EVERY 8 HOURS
Refills: 0 | Status: DISCONTINUED | OUTPATIENT
Start: 2023-01-01 | End: 2023-01-01

## 2023-01-01 RX ORDER — HALOPERIDOL DECANOATE 100 MG/ML
5 INJECTION INTRAMUSCULAR ONCE
Refills: 0 | Status: COMPLETED | OUTPATIENT
Start: 2023-01-01 | End: 2023-01-01

## 2023-01-01 RX ORDER — ACETAMINOPHEN 500 MG
975 TABLET ORAL EVERY 8 HOURS
Refills: 0 | Status: DISCONTINUED | OUTPATIENT
Start: 2023-01-01 | End: 2023-01-01

## 2023-01-01 RX ORDER — MUPIROCIN 20 MG/G
1 OINTMENT TOPICAL
Qty: 0 | Refills: 0 | DISCHARGE

## 2023-01-01 RX ORDER — SIMVASTATIN 20 MG/1
1 TABLET, FILM COATED ORAL
Qty: 0 | Refills: 0 | DISCHARGE

## 2023-01-01 RX ORDER — CEFTRIAXONE 500 MG/1
1000 INJECTION, POWDER, FOR SOLUTION INTRAMUSCULAR; INTRAVENOUS ONCE
Refills: 0 | Status: COMPLETED | OUTPATIENT
Start: 2023-01-01 | End: 2023-01-01

## 2023-01-01 RX ORDER — BUDESONIDE AND FORMOTEROL FUMARATE DIHYDRATE 160; 4.5 UG/1; UG/1
2 AEROSOL RESPIRATORY (INHALATION)
Refills: 0 | DISCHARGE

## 2023-01-01 RX ORDER — LACTOBACILLUS ACIDOPHILUS 100MM CELL
1 CAPSULE ORAL
Refills: 0 | Status: DISCONTINUED | OUTPATIENT
Start: 2023-01-01 | End: 2023-01-01

## 2023-01-01 RX ORDER — FOLIC ACID 0.8 MG
1 TABLET ORAL
Refills: 0 | DISCHARGE

## 2023-01-01 RX ORDER — GLUCAGON INJECTION, SOLUTION 0.5 MG/.1ML
1 INJECTION, SOLUTION SUBCUTANEOUS ONCE
Refills: 0 | Status: COMPLETED | OUTPATIENT
Start: 2023-01-01 | End: 2023-01-01

## 2023-01-01 RX ORDER — ROCURONIUM BROMIDE 10 MG/ML
60 VIAL (ML) INTRAVENOUS ONCE
Refills: 0 | Status: COMPLETED | OUTPATIENT
Start: 2023-01-01 | End: 2023-01-01

## 2023-01-01 RX ORDER — LATANOPROST 0.05 MG/ML
1 SOLUTION/ DROPS OPHTHALMIC; TOPICAL
Refills: 0 | DISCHARGE
Start: 2023-01-01

## 2023-01-01 RX ORDER — HYDRALAZINE HCL 50 MG
1 TABLET ORAL
Refills: 0 | DISCHARGE

## 2023-01-01 RX ORDER — DIPHENHYDRAMINE HCL 50 MG
1 CAPSULE ORAL
Qty: 0 | Refills: 0 | DISCHARGE

## 2023-01-01 RX ORDER — METOLAZONE 5 MG/1
1 TABLET ORAL
Qty: 0 | Refills: 0 | DISCHARGE

## 2023-01-01 RX ORDER — INSULIN GLARGINE 100 [IU]/ML
4 INJECTION, SOLUTION SUBCUTANEOUS
Qty: 0 | Refills: 0 | DISCHARGE
Start: 2023-01-01

## 2023-01-01 RX ORDER — ZOLPIDEM TARTRATE 10 MG/1
1 TABLET ORAL
Qty: 0 | Refills: 0 | DISCHARGE

## 2023-01-01 RX ORDER — PREGABALIN 225 MG/1
1000 CAPSULE ORAL DAILY
Refills: 0 | Status: DISCONTINUED | OUTPATIENT
Start: 2023-01-01 | End: 2023-01-01

## 2023-01-01 RX ORDER — ERYTHROPOIETIN 10000 [IU]/ML
4000 INJECTION, SOLUTION INTRAVENOUS; SUBCUTANEOUS
Qty: 0 | Refills: 0 | DISCHARGE
Start: 2023-01-01

## 2023-01-01 RX ORDER — KETAMINE HYDROCHLORIDE 100 MG/ML
60 INJECTION INTRAMUSCULAR; INTRAVENOUS ONCE
Refills: 0 | Status: DISCONTINUED | OUTPATIENT
Start: 2023-01-01 | End: 2023-01-01

## 2023-01-01 RX ORDER — ALBUTEROL 90 UG/1
2 AEROSOL, METERED ORAL
Qty: 1 | Refills: 0
Start: 2023-01-01

## 2023-01-01 RX ORDER — HYDRALAZINE HCL 50 MG
10 TABLET ORAL ONCE
Refills: 0 | Status: COMPLETED | OUTPATIENT
Start: 2023-01-01 | End: 2023-01-01

## 2023-01-01 RX ORDER — FUROSEMIDE 40 MG
1 TABLET ORAL
Refills: 0 | DISCHARGE

## 2023-01-01 RX ORDER — OXYCODONE AND ACETAMINOPHEN 5; 325 MG/1; MG/1
1 TABLET ORAL
Refills: 0 | DISCHARGE

## 2023-01-01 RX ORDER — SEVELAMER CARBONATE 2400 MG/1
1 POWDER, FOR SUSPENSION ORAL
Refills: 0 | DISCHARGE

## 2023-01-01 RX ORDER — DEXTROSE 50 % IN WATER 50 %
12.5 SYRINGE (ML) INTRAVENOUS ONCE
Refills: 0 | Status: DISCONTINUED | OUTPATIENT
Start: 2023-01-01 | End: 2023-01-01

## 2023-01-01 RX ORDER — FERROUS SULFATE 325(65) MG
1 TABLET ORAL
Qty: 0 | Refills: 0 | DISCHARGE
Start: 2023-01-01

## 2023-01-01 RX ORDER — ASPIRIN/CALCIUM CARB/MAGNESIUM 324 MG
1 TABLET ORAL
Refills: 0 | DISCHARGE

## 2023-01-01 RX ORDER — BUDESONIDE AND FORMOTEROL FUMARATE DIHYDRATE 160; 4.5 UG/1; UG/1
1 AEROSOL RESPIRATORY (INHALATION)
Qty: 0 | Refills: 0 | DISCHARGE

## 2023-01-01 RX ORDER — VANCOMYCIN HCL 1 G
2.5 VIAL (EA) INTRAVENOUS
Qty: 0 | Refills: 0 | DISCHARGE

## 2023-01-01 RX ORDER — PANTOPRAZOLE SODIUM 20 MG/1
1 TABLET, DELAYED RELEASE ORAL
Refills: 0 | DISCHARGE

## 2023-01-01 RX ORDER — AZITHROMYCIN 500 MG/1
500 TABLET, FILM COATED ORAL ONCE
Refills: 0 | Status: COMPLETED | OUTPATIENT
Start: 2023-01-01 | End: 2023-01-01

## 2023-01-01 RX ORDER — ONDANSETRON 8 MG/1
4 TABLET, FILM COATED ORAL
Refills: 0 | Status: DISCONTINUED | OUTPATIENT
Start: 2023-01-01 | End: 2023-01-01

## 2023-01-01 RX ORDER — CEFTRIAXONE 500 MG/1
INJECTION, POWDER, FOR SOLUTION INTRAMUSCULAR; INTRAVENOUS
Refills: 0 | Status: DISCONTINUED | OUTPATIENT
Start: 2023-01-01 | End: 2023-01-01

## 2023-01-01 RX ORDER — SENNA PLUS 8.6 MG/1
2 TABLET ORAL AT BEDTIME
Refills: 0 | Status: DISCONTINUED | OUTPATIENT
Start: 2023-01-01 | End: 2023-01-01

## 2023-01-01 RX ORDER — SIMVASTATIN 20 MG/1
40 TABLET, FILM COATED ORAL AT BEDTIME
Refills: 0 | Status: DISCONTINUED | OUTPATIENT
Start: 2023-01-01 | End: 2023-01-01

## 2023-01-01 RX ORDER — ALPRAZOLAM 0.25 MG
1 TABLET ORAL
Qty: 0 | Refills: 0 | DISCHARGE
Start: 2023-01-01

## 2023-01-01 RX ORDER — VANCOMYCIN HCL 1 G
125 VIAL (EA) INTRAVENOUS EVERY 6 HOURS
Refills: 0 | Status: DISCONTINUED | OUTPATIENT
Start: 2023-01-01 | End: 2023-01-01

## 2023-01-01 RX ORDER — ATORVASTATIN CALCIUM 80 MG/1
1 TABLET, FILM COATED ORAL
Qty: 0 | Refills: 0 | DISCHARGE
Start: 2023-01-01

## 2023-01-01 RX ORDER — PANTOPRAZOLE SODIUM 20 MG/1
40 TABLET, DELAYED RELEASE ORAL ONCE
Refills: 0 | Status: COMPLETED | OUTPATIENT
Start: 2023-01-01 | End: 2023-01-01

## 2023-01-01 RX ORDER — ONDANSETRON 8 MG/1
4 TABLET, FILM COATED ORAL
Qty: 0 | Refills: 0 | DISCHARGE
Start: 2023-01-01

## 2023-01-01 RX ORDER — SERTRALINE 25 MG/1
50 TABLET, FILM COATED ORAL DAILY
Refills: 0 | Status: DISCONTINUED | OUTPATIENT
Start: 2023-01-01 | End: 2023-01-01

## 2023-01-01 RX ORDER — NYSTATIN CREAM 100000 [USP'U]/G
1 CREAM TOPICAL
Refills: 0 | Status: DISCONTINUED | OUTPATIENT
Start: 2023-01-01 | End: 2023-01-01

## 2023-01-01 RX ORDER — ERYTHROPOIETIN 10000 [IU]/ML
4000 INJECTION, SOLUTION INTRAVENOUS; SUBCUTANEOUS
Refills: 0 | Status: DISCONTINUED | OUTPATIENT
Start: 2023-01-01 | End: 2023-01-01

## 2023-01-01 RX ORDER — HYDRALAZINE HCL 50 MG
25 TABLET ORAL EVERY 8 HOURS
Refills: 0 | Status: DISCONTINUED | OUTPATIENT
Start: 2023-01-01 | End: 2023-01-01

## 2023-01-01 RX ORDER — IPRATROPIUM/ALBUTEROL SULFATE 18-103MCG
3 AEROSOL WITH ADAPTER (GRAM) INHALATION
Refills: 0 | DISCHARGE

## 2023-01-01 RX ORDER — DIAZEPAM 5 MG
2.5 TABLET ORAL ONCE
Refills: 0 | Status: DISCONTINUED | OUTPATIENT
Start: 2023-01-01 | End: 2023-01-01

## 2023-01-01 RX ORDER — METOLAZONE 10 MG
10 TABLET ORAL
Refills: 0 | Status: ACTIVE | COMMUNITY

## 2023-01-01 RX ORDER — PANTOPRAZOLE SODIUM 20 MG/1
1 TABLET, DELAYED RELEASE ORAL
Qty: 0 | Refills: 0 | DISCHARGE
Start: 2023-01-01

## 2023-01-01 RX ORDER — LIDOCAINE 4 G/100G
2 CREAM TOPICAL DAILY
Refills: 0 | Status: DISCONTINUED | OUTPATIENT
Start: 2023-01-01 | End: 2023-01-01

## 2023-01-01 RX ORDER — ERYTHROPOIETIN 10000 [IU]/ML
20000 INJECTION, SOLUTION INTRAVENOUS; SUBCUTANEOUS
Qty: 1 | Refills: 0
Start: 2023-01-01

## 2023-01-01 RX ORDER — SODIUM ZIRCONIUM CYCLOSILICATE 10 G/10G
10 POWDER, FOR SUSPENSION ORAL EVERY 12 HOURS
Refills: 0 | Status: COMPLETED | OUTPATIENT
Start: 2023-01-01 | End: 2023-01-01

## 2023-01-01 RX ORDER — CEFEPIME 1 G/1
1000 INJECTION, POWDER, FOR SOLUTION INTRAMUSCULAR; INTRAVENOUS EVERY 24 HOURS
Refills: 0 | Status: DISCONTINUED | OUTPATIENT
Start: 2023-01-01 | End: 2023-01-01

## 2023-01-01 RX ORDER — OXYCODONE HYDROCHLORIDE 5 MG/1
1 TABLET ORAL
Qty: 0 | Refills: 0 | DISCHARGE
Start: 2023-01-01

## 2023-01-01 RX ORDER — FERROUS SULFATE 325(65) MG
1 TABLET ORAL
Qty: 0 | Refills: 0 | DISCHARGE

## 2023-01-01 RX ORDER — INSULIN LISPRO 100/ML
0 VIAL (ML) SUBCUTANEOUS
Qty: 0 | Refills: 0 | DISCHARGE
Start: 2023-01-01

## 2023-01-01 RX ORDER — FERROUS SULFATE 325(65) MG
1 TABLET ORAL
Refills: 0 | DISCHARGE

## 2023-01-01 RX ORDER — SODIUM CHLORIDE 9 MG/ML
500 INJECTION INTRAMUSCULAR; INTRAVENOUS; SUBCUTANEOUS ONCE
Refills: 0 | Status: COMPLETED | OUTPATIENT
Start: 2023-01-01 | End: 2023-01-01

## 2023-01-01 RX ORDER — SUCRALFATE 1 G
1 TABLET ORAL
Refills: 0 | DISCHARGE

## 2023-01-01 RX ORDER — CIPROFLOXACIN LACTATE 400MG/40ML
250 VIAL (ML) INTRAVENOUS ONCE
Refills: 0 | Status: COMPLETED | OUTPATIENT
Start: 2023-01-01 | End: 2023-01-01

## 2023-01-01 RX ORDER — SODIUM ZIRCONIUM CYCLOSILICATE 10 G/10G
10 POWDER, FOR SUSPENSION ORAL
Refills: 0 | DISCHARGE

## 2023-01-01 RX ORDER — HYDRALAZINE HCL 50 MG
50 TABLET ORAL THREE TIMES A DAY
Refills: 0 | Status: DISCONTINUED | OUTPATIENT
Start: 2023-01-01 | End: 2023-01-01

## 2023-01-01 RX ORDER — MORPHINE SULFATE 50 MG/1
2 CAPSULE, EXTENDED RELEASE ORAL ONCE
Refills: 0 | Status: DISCONTINUED | OUTPATIENT
Start: 2023-01-01 | End: 2023-01-01

## 2023-01-01 RX ORDER — SUCRALFATE 1 G
10 TABLET ORAL
Qty: 560 | Refills: 0
Start: 2023-01-01 | End: 2023-01-01

## 2023-01-01 RX ORDER — METOCLOPRAMIDE HCL 10 MG
1 TABLET ORAL
Refills: 0 | DISCHARGE

## 2023-01-01 RX ORDER — SODIUM CHLORIDE 9 MG/ML
1000 INJECTION, SOLUTION INTRAVENOUS
Refills: 0 | Status: COMPLETED | OUTPATIENT
Start: 2023-01-01 | End: 2023-01-01

## 2023-01-01 RX ORDER — FOLIC ACID 0.8 MG
1 TABLET ORAL
Qty: 0 | Refills: 0 | DISCHARGE

## 2023-01-01 RX ORDER — ACETAMINOPHEN 500 MG
1000 TABLET ORAL ONCE
Refills: 0 | Status: DISCONTINUED | OUTPATIENT
Start: 2023-01-01 | End: 2023-01-01

## 2023-01-01 RX ORDER — ACETAMINOPHEN 500 MG
1000 TABLET ORAL EVERY 8 HOURS
Refills: 0 | Status: DISCONTINUED | OUTPATIENT
Start: 2023-01-01 | End: 2023-01-01

## 2023-01-01 RX ORDER — SIMVASTATIN 20 MG/1
1 TABLET, FILM COATED ORAL
Qty: 0 | Refills: 0 | DISCHARGE
Start: 2023-01-01

## 2023-01-01 RX ORDER — CEFTRIAXONE 500 MG/1
1000 INJECTION, POWDER, FOR SOLUTION INTRAMUSCULAR; INTRAVENOUS EVERY 24 HOURS
Refills: 0 | Status: DISCONTINUED | OUTPATIENT
Start: 2023-01-01 | End: 2023-01-01

## 2023-01-01 RX ORDER — INSULIN LISPRO 100/ML
0 VIAL (ML) SUBCUTANEOUS
Refills: 0 | DISCHARGE
Start: 2023-01-01

## 2023-01-01 RX ORDER — DIPHENHYDRAMINE HCL 50 MG
1 CAPSULE ORAL
Qty: 0 | Refills: 0 | DISCHARGE
Start: 2023-01-01

## 2023-01-01 RX ORDER — DIPHENHYDRAMINE HCL 50 MG
25 CAPSULE ORAL AT BEDTIME
Refills: 0 | Status: DISCONTINUED | OUTPATIENT
Start: 2023-01-01 | End: 2023-01-01

## 2023-01-01 RX ORDER — FENTANYL CITRATE 50 UG/ML
25 INJECTION INTRAVENOUS EVERY 6 HOURS
Refills: 0 | Status: DISCONTINUED | OUTPATIENT
Start: 2023-01-01 | End: 2023-01-01

## 2023-01-01 RX ORDER — SODIUM ZIRCONIUM CYCLOSILICATE 10 G/10G
1 POWDER, FOR SUSPENSION ORAL
Qty: 0 | Refills: 0 | DISCHARGE

## 2023-01-01 RX ORDER — INSULIN LISPRO 100 [IU]/ML
100 INJECTION, SOLUTION INTRAVENOUS; SUBCUTANEOUS
Refills: 0 | Status: ACTIVE | COMMUNITY

## 2023-01-01 RX ORDER — CALCIUM CARBONATE 500(1250)
1 TABLET ORAL
Refills: 0 | DISCHARGE

## 2023-01-01 RX ORDER — LATANOPROST 0.05 MG/ML
1 SOLUTION/ DROPS OPHTHALMIC; TOPICAL
Refills: 0 | DISCHARGE

## 2023-01-01 RX ORDER — ASPIRIN/CALCIUM CARB/MAGNESIUM 324 MG
1 TABLET ORAL
Qty: 0 | Refills: 0 | DISCHARGE
Start: 2023-01-01

## 2023-01-01 RX ORDER — ALPRAZOLAM 0.25 MG
1 TABLET ORAL
Qty: 0 | Refills: 0 | DISCHARGE

## 2023-01-01 RX ORDER — MUPIROCIN 20 MG/G
1 OINTMENT TOPICAL
Refills: 0 | Status: DISCONTINUED | OUTPATIENT
Start: 2023-01-01 | End: 2023-01-01

## 2023-01-01 RX ORDER — INSULIN GLARGINE 100 [IU]/ML
4 INJECTION, SOLUTION SUBCUTANEOUS
Refills: 0 | DISCHARGE

## 2023-01-01 RX ORDER — AMLODIPINE BESYLATE 2.5 MG/1
1 TABLET ORAL
Qty: 0 | Refills: 0 | DISCHARGE

## 2023-01-01 RX ORDER — CIPROFLOXACIN LACTATE 400MG/40ML
1 VIAL (ML) INTRAVENOUS
Qty: 5 | Refills: 0
Start: 2023-01-01 | End: 2023-01-01

## 2023-01-01 RX ORDER — OLANZAPINE 15 MG/1
2.5 TABLET, FILM COATED ORAL ONCE
Refills: 0 | Status: COMPLETED | OUTPATIENT
Start: 2023-01-01 | End: 2023-01-01

## 2023-01-01 RX ORDER — ZOLPIDEM TARTRATE 10 MG/1
1 TABLET ORAL
Qty: 0 | Refills: 0 | DISCHARGE
Start: 2023-01-01

## 2023-01-01 RX ORDER — DEXTROSE 50 % IN WATER 50 %
50 SYRINGE (ML) INTRAVENOUS ONCE
Refills: 0 | Status: COMPLETED | OUTPATIENT
Start: 2023-01-01 | End: 2023-01-01

## 2023-01-01 RX ORDER — PROPOFOL 10 MG/ML
15 INJECTION, EMULSION INTRAVENOUS
Qty: 500 | Refills: 0 | Status: DISCONTINUED | OUTPATIENT
Start: 2023-01-01 | End: 2023-01-01

## 2023-01-01 RX ORDER — ALBUTEROL 90 UG/1
2 AEROSOL, METERED ORAL
Qty: 0 | Refills: 0 | DISCHARGE
Start: 2023-01-01

## 2023-01-01 RX ORDER — INSULIN LISPRO 100/ML
1 VIAL (ML) SUBCUTANEOUS
Qty: 0 | Refills: 0 | DISCHARGE
Start: 2023-01-01

## 2023-01-01 RX ORDER — PANTOPRAZOLE SODIUM 20 MG/1
1 TABLET, DELAYED RELEASE ORAL
Qty: 0 | Refills: 0 | DISCHARGE

## 2023-01-01 RX ORDER — SUCRALFATE 1 G
10 TABLET ORAL
Qty: 0 | Refills: 0 | DISCHARGE
Start: 2023-01-01

## 2023-01-01 RX ORDER — LACTOBACILLUS ACIDOPHILUS 100MM CELL
1 CAPSULE ORAL
Refills: 0 | DISCHARGE

## 2023-01-01 RX ORDER — METOCLOPRAMIDE 10 MG/1
10 TABLET ORAL
Refills: 0 | Status: ACTIVE | COMMUNITY

## 2023-01-01 RX ORDER — AZITHROMYCIN 500 MG/1
500 TABLET, FILM COATED ORAL EVERY 24 HOURS
Refills: 0 | Status: DISCONTINUED | OUTPATIENT
Start: 2023-01-01 | End: 2023-01-01

## 2023-01-01 RX ORDER — NYSTATIN CREAM 100000 [USP'U]/G
1 CREAM TOPICAL
Refills: 0 | DISCHARGE

## 2023-01-01 RX ORDER — HALOPERIDOL DECANOATE 100 MG/ML
0.5 INJECTION INTRAMUSCULAR EVERY 8 HOURS
Refills: 0 | Status: DISCONTINUED | OUTPATIENT
Start: 2023-01-01 | End: 2023-01-01

## 2023-01-01 RX ORDER — GLUCAGON INJECTION, SOLUTION 0.5 MG/.1ML
1 INJECTION, SOLUTION SUBCUTANEOUS
Qty: 0 | Refills: 0 | DISCHARGE

## 2023-01-01 RX ORDER — FUROSEMIDE 80 MG/1
80 TABLET ORAL
Refills: 0 | Status: ACTIVE | COMMUNITY

## 2023-01-01 RX ORDER — CHOLECALCIFEROL (VITAMIN D3) 125 MCG
1 CAPSULE ORAL
Qty: 0 | Refills: 0 | DISCHARGE

## 2023-01-01 RX ORDER — METOCLOPRAMIDE HCL 10 MG
1 TABLET ORAL
Qty: 0 | Refills: 0 | DISCHARGE

## 2023-01-01 RX ORDER — KETAMINE HYDROCHLORIDE 100 MG/ML
120 INJECTION INTRAMUSCULAR; INTRAVENOUS ONCE
Refills: 0 | Status: DISCONTINUED | OUTPATIENT
Start: 2023-01-01 | End: 2023-01-01

## 2023-01-01 RX ORDER — METOPROLOL TARTRATE 50 MG
5 TABLET ORAL ONCE
Refills: 0 | Status: COMPLETED | OUTPATIENT
Start: 2023-01-01 | End: 2023-01-01

## 2023-01-01 RX ORDER — POLYETHYLENE GLYCOL 3350 17 G/17G
17 POWDER, FOR SOLUTION ORAL DAILY
Refills: 0 | Status: DISCONTINUED | OUTPATIENT
Start: 2023-01-01 | End: 2023-01-01

## 2023-01-01 RX ORDER — ATORVASTATIN CALCIUM 80 MG/1
80 TABLET, FILM COATED ORAL AT BEDTIME
Refills: 0 | Status: DISCONTINUED | OUTPATIENT
Start: 2023-01-01 | End: 2023-01-01

## 2023-01-01 RX ORDER — PREGABALIN 225 MG/1
1 CAPSULE ORAL
Qty: 0 | Refills: 0 | DISCHARGE

## 2023-01-01 RX ORDER — METOCLOPRAMIDE HCL 10 MG
10 TABLET ORAL
Refills: 0 | Status: DISCONTINUED | OUTPATIENT
Start: 2023-01-01 | End: 2023-01-01

## 2023-01-01 RX ORDER — BUDESONIDE AND FORMOTEROL FUMARATE DIHYDRATE 160; 4.5 UG/1; UG/1
160-4.5 AEROSOL RESPIRATORY (INHALATION)
Refills: 0 | Status: ACTIVE | COMMUNITY

## 2023-01-01 RX ORDER — LACTOBACILLUS ACIDOPHILUS 100MM CELL
1 CAPSULE ORAL
Refills: 0 | DISCHARGE
Start: 2023-01-01

## 2023-01-01 RX ADMIN — BUDESONIDE AND FORMOTEROL FUMARATE DIHYDRATE 2 PUFF(S): 160; 4.5 AEROSOL RESPIRATORY (INHALATION) at 22:54

## 2023-01-01 RX ADMIN — CHLORHEXIDINE GLUCONATE 1 APPLICATION(S): 213 SOLUTION TOPICAL at 12:56

## 2023-01-01 RX ADMIN — Medication 125 MILLIGRAM(S): at 06:27

## 2023-01-01 RX ADMIN — Medication 2: at 07:58

## 2023-01-01 RX ADMIN — Medication 10 MILLIGRAM(S): at 17:15

## 2023-01-01 RX ADMIN — Medication 3 MILLIGRAM(S): at 22:33

## 2023-01-01 RX ADMIN — Medication 1 GRAM(S): at 05:58

## 2023-01-01 RX ADMIN — ONDANSETRON 4 MILLIGRAM(S): 8 TABLET, FILM COATED ORAL at 20:43

## 2023-01-01 RX ADMIN — Medication 3 MILLILITER(S): at 02:11

## 2023-01-01 RX ADMIN — Medication 25 MILLIGRAM(S): at 22:04

## 2023-01-01 RX ADMIN — Medication 1 MILLIGRAM(S): at 12:25

## 2023-01-01 RX ADMIN — Medication 12.5 MILLIGRAM(S): at 18:23

## 2023-01-01 RX ADMIN — Medication 1 MILLIGRAM(S): at 12:28

## 2023-01-01 RX ADMIN — Medication 650 MILLIGRAM(S): at 09:59

## 2023-01-01 RX ADMIN — PREGABALIN 1000 MICROGRAM(S): 225 CAPSULE ORAL at 12:25

## 2023-01-01 RX ADMIN — AMLODIPINE BESYLATE 10 MILLIGRAM(S): 2.5 TABLET ORAL at 05:14

## 2023-01-01 RX ADMIN — Medication 1 GRAM(S): at 23:08

## 2023-01-01 RX ADMIN — LIDOCAINE 1 PATCH: 4 CREAM TOPICAL at 11:26

## 2023-01-01 RX ADMIN — Medication 10 MILLIGRAM(S): at 06:19

## 2023-01-01 RX ADMIN — Medication 1 GRAM(S): at 05:14

## 2023-01-01 RX ADMIN — Medication 40 MILLIGRAM(S): at 05:44

## 2023-01-01 RX ADMIN — AMLODIPINE BESYLATE 10 MILLIGRAM(S): 2.5 TABLET ORAL at 06:07

## 2023-01-01 RX ADMIN — CHLORHEXIDINE GLUCONATE 1 APPLICATION(S): 213 SOLUTION TOPICAL at 05:15

## 2023-01-01 RX ADMIN — Medication 1: at 11:43

## 2023-01-01 RX ADMIN — Medication 25 MICROGRAM(S): at 05:49

## 2023-01-01 RX ADMIN — Medication 400 MILLIGRAM(S): at 21:32

## 2023-01-01 RX ADMIN — PANTOPRAZOLE SODIUM 40 MILLIGRAM(S): 20 TABLET, DELAYED RELEASE ORAL at 07:02

## 2023-01-01 RX ADMIN — Medication 3 MILLILITER(S): at 03:10

## 2023-01-01 RX ADMIN — Medication 125 MILLIGRAM(S): at 23:39

## 2023-01-01 RX ADMIN — Medication 25 MILLIGRAM(S): at 13:27

## 2023-01-01 RX ADMIN — ALBUTEROL 2 PUFF(S): 90 AEROSOL, METERED ORAL at 16:26

## 2023-01-01 RX ADMIN — Medication 3 MILLIGRAM(S): at 22:02

## 2023-01-01 RX ADMIN — LATANOPROST 1 DROP(S): 0.05 SOLUTION/ DROPS OPHTHALMIC; TOPICAL at 21:42

## 2023-01-01 RX ADMIN — SEVELAMER CARBONATE 800 MILLIGRAM(S): 2400 POWDER, FOR SUSPENSION ORAL at 09:24

## 2023-01-01 RX ADMIN — ERYTHROPOIETIN 20000 UNIT(S): 10000 INJECTION, SOLUTION INTRAVENOUS; SUBCUTANEOUS at 10:43

## 2023-01-01 RX ADMIN — PANTOPRAZOLE SODIUM 40 MILLIGRAM(S): 20 TABLET, DELAYED RELEASE ORAL at 06:09

## 2023-01-01 RX ADMIN — LIDOCAINE 1 PATCH: 4 CREAM TOPICAL at 13:13

## 2023-01-01 RX ADMIN — Medication 25 MILLIGRAM(S): at 05:58

## 2023-01-01 RX ADMIN — CHLORHEXIDINE GLUCONATE 1 APPLICATION(S): 213 SOLUTION TOPICAL at 12:26

## 2023-01-01 RX ADMIN — Medication 25 MICROGRAM(S): at 05:17

## 2023-01-01 RX ADMIN — OXYCODONE HYDROCHLORIDE 5 MILLIGRAM(S): 5 TABLET ORAL at 06:54

## 2023-01-01 RX ADMIN — Medication 0.25 MILLIGRAM(S): at 23:36

## 2023-01-01 RX ADMIN — Medication 25 MILLIGRAM(S): at 22:09

## 2023-01-01 RX ADMIN — PANTOPRAZOLE SODIUM 40 MILLIGRAM(S): 20 TABLET, DELAYED RELEASE ORAL at 17:28

## 2023-01-01 RX ADMIN — Medication 25 MILLIGRAM(S): at 18:04

## 2023-01-01 RX ADMIN — Medication 3 MILLILITER(S): at 08:21

## 2023-01-01 RX ADMIN — Medication 40 MILLIGRAM(S): at 06:57

## 2023-01-01 RX ADMIN — Medication 80 MILLIGRAM(S): at 06:25

## 2023-01-01 RX ADMIN — Medication 1000 MILLIGRAM(S): at 18:53

## 2023-01-01 RX ADMIN — Medication 1 MILLIGRAM(S): at 13:09

## 2023-01-01 RX ADMIN — Medication 25 MILLIGRAM(S): at 21:11

## 2023-01-01 RX ADMIN — Medication 25 MICROGRAM(S): at 05:27

## 2023-01-01 RX ADMIN — PANTOPRAZOLE SODIUM 40 MILLIGRAM(S): 20 TABLET, DELAYED RELEASE ORAL at 06:06

## 2023-01-01 RX ADMIN — Medication 3 MILLILITER(S): at 19:09

## 2023-01-01 RX ADMIN — ERYTHROPOIETIN 4000 UNIT(S): 10000 INJECTION, SOLUTION INTRAVENOUS; SUBCUTANEOUS at 14:00

## 2023-01-01 RX ADMIN — ALBUTEROL 2 PUFF(S): 90 AEROSOL, METERED ORAL at 08:58

## 2023-01-01 RX ADMIN — Medication 25 MILLIGRAM(S): at 17:44

## 2023-01-01 RX ADMIN — Medication 0.5 MILLIGRAM(S): at 06:32

## 2023-01-01 RX ADMIN — SEVELAMER CARBONATE 800 MILLIGRAM(S): 2400 POWDER, FOR SUSPENSION ORAL at 17:12

## 2023-01-01 RX ADMIN — Medication 1 GRAM(S): at 05:44

## 2023-01-01 RX ADMIN — PANTOPRAZOLE SODIUM 40 MILLIGRAM(S): 20 TABLET, DELAYED RELEASE ORAL at 05:24

## 2023-01-01 RX ADMIN — SIMVASTATIN 40 MILLIGRAM(S): 20 TABLET, FILM COATED ORAL at 21:12

## 2023-01-01 RX ADMIN — Medication 325 MILLIGRAM(S): at 13:28

## 2023-01-01 RX ADMIN — Medication 40 MILLIGRAM(S): at 06:13

## 2023-01-01 RX ADMIN — Medication 975 MILLIGRAM(S): at 18:08

## 2023-01-01 RX ADMIN — Medication 1 GRAM(S): at 06:56

## 2023-01-01 RX ADMIN — Medication 25 MILLIGRAM(S): at 06:58

## 2023-01-01 RX ADMIN — Medication 5 MILLIGRAM(S): at 13:39

## 2023-01-01 RX ADMIN — Medication 1 UNIT(S): at 08:21

## 2023-01-01 RX ADMIN — PANTOPRAZOLE SODIUM 40 MILLIGRAM(S): 20 TABLET, DELAYED RELEASE ORAL at 21:21

## 2023-01-01 RX ADMIN — ATORVASTATIN CALCIUM 40 MILLIGRAM(S): 80 TABLET, FILM COATED ORAL at 22:35

## 2023-01-01 RX ADMIN — PANTOPRAZOLE SODIUM 40 MILLIGRAM(S): 20 TABLET, DELAYED RELEASE ORAL at 06:14

## 2023-01-01 RX ADMIN — PANTOPRAZOLE SODIUM 40 MILLIGRAM(S): 20 TABLET, DELAYED RELEASE ORAL at 05:56

## 2023-01-01 RX ADMIN — ZOLPIDEM TARTRATE 5 MILLIGRAM(S): 10 TABLET ORAL at 23:35

## 2023-01-01 RX ADMIN — OXYCODONE HYDROCHLORIDE 2.5 MILLIGRAM(S): 5 TABLET ORAL at 10:05

## 2023-01-01 RX ADMIN — Medication 125 MILLIGRAM(S): at 11:52

## 2023-01-01 RX ADMIN — Medication 650 MILLIGRAM(S): at 22:36

## 2023-01-01 RX ADMIN — Medication 25 MILLIGRAM(S): at 23:49

## 2023-01-01 RX ADMIN — Medication 3 MILLILITER(S): at 17:34

## 2023-01-01 RX ADMIN — Medication 1 UNIT(S): at 08:11

## 2023-01-01 RX ADMIN — Medication 1 PATCH: at 08:01

## 2023-01-01 RX ADMIN — Medication 1 GRAM(S): at 05:36

## 2023-01-01 RX ADMIN — Medication 1: at 17:09

## 2023-01-01 RX ADMIN — Medication 1 GRAM(S): at 17:45

## 2023-01-01 RX ADMIN — Medication 3 MILLILITER(S): at 03:19

## 2023-01-01 RX ADMIN — Medication 1 GRAM(S): at 12:04

## 2023-01-01 RX ADMIN — SERTRALINE 50 MILLIGRAM(S): 25 TABLET, FILM COATED ORAL at 14:44

## 2023-01-01 RX ADMIN — Medication 80 MILLIGRAM(S): at 04:23

## 2023-01-01 RX ADMIN — Medication 25 MILLIGRAM(S): at 15:46

## 2023-01-01 RX ADMIN — Medication 1 GRAM(S): at 06:13

## 2023-01-01 RX ADMIN — AMLODIPINE BESYLATE 10 MILLIGRAM(S): 2.5 TABLET ORAL at 05:59

## 2023-01-01 RX ADMIN — HALOPERIDOL DECANOATE 0.5 MILLIGRAM(S): 100 INJECTION INTRAMUSCULAR at 12:08

## 2023-01-01 RX ADMIN — Medication 3 MILLIGRAM(S): at 22:21

## 2023-01-01 RX ADMIN — Medication 1 TABLET(S): at 06:38

## 2023-01-01 RX ADMIN — Medication 80 MILLIGRAM(S): at 06:05

## 2023-01-01 RX ADMIN — SEVELAMER CARBONATE 800 MILLIGRAM(S): 2400 POWDER, FOR SUSPENSION ORAL at 12:03

## 2023-01-01 RX ADMIN — Medication 1 GRAM(S): at 17:24

## 2023-01-01 RX ADMIN — BUDESONIDE AND FORMOTEROL FUMARATE DIHYDRATE 2 PUFF(S): 160; 4.5 AEROSOL RESPIRATORY (INHALATION) at 21:50

## 2023-01-01 RX ADMIN — ERYTHROPOIETIN 10000 UNIT(S): 10000 INJECTION, SOLUTION INTRAVENOUS; SUBCUTANEOUS at 14:38

## 2023-01-01 RX ADMIN — PANTOPRAZOLE SODIUM 40 MILLIGRAM(S): 20 TABLET, DELAYED RELEASE ORAL at 06:00

## 2023-01-01 RX ADMIN — ONDANSETRON 4 MILLIGRAM(S): 8 TABLET, FILM COATED ORAL at 15:54

## 2023-01-01 RX ADMIN — INSULIN GLARGINE 4 UNIT(S): 100 INJECTION, SOLUTION SUBCUTANEOUS at 21:24

## 2023-01-01 RX ADMIN — Medication 1 TABLET(S): at 17:42

## 2023-01-01 RX ADMIN — Medication 3 MILLILITER(S): at 08:20

## 2023-01-01 RX ADMIN — Medication 81 MILLIGRAM(S): at 17:54

## 2023-01-01 RX ADMIN — Medication 1 GRAM(S): at 23:02

## 2023-01-01 RX ADMIN — Medication 3 MILLILITER(S): at 22:49

## 2023-01-01 RX ADMIN — Medication 1 TABLET(S): at 18:58

## 2023-01-01 RX ADMIN — ONDANSETRON 4 MILLIGRAM(S): 8 TABLET, FILM COATED ORAL at 16:41

## 2023-01-01 RX ADMIN — ONDANSETRON 4 MILLIGRAM(S): 8 TABLET, FILM COATED ORAL at 15:10

## 2023-01-01 RX ADMIN — AMLODIPINE BESYLATE 10 MILLIGRAM(S): 2.5 TABLET ORAL at 05:29

## 2023-01-01 RX ADMIN — Medication 5 MILLIGRAM(S): at 21:41

## 2023-01-01 RX ADMIN — Medication 1 TABLET(S): at 16:36

## 2023-01-01 RX ADMIN — Medication 3 MILLILITER(S): at 00:30

## 2023-01-01 RX ADMIN — Medication 3 MILLILITER(S): at 21:28

## 2023-01-01 RX ADMIN — LATANOPROST 1 DROP(S): 0.05 SOLUTION/ DROPS OPHTHALMIC; TOPICAL at 22:39

## 2023-01-01 RX ADMIN — ERYTHROPOIETIN 10000 UNIT(S): 10000 INJECTION, SOLUTION INTRAVENOUS; SUBCUTANEOUS at 18:30

## 2023-01-01 RX ADMIN — Medication 3 MILLILITER(S): at 01:07

## 2023-01-01 RX ADMIN — AMLODIPINE BESYLATE 10 MILLIGRAM(S): 2.5 TABLET ORAL at 06:58

## 2023-01-01 RX ADMIN — Medication 25 MILLIGRAM(S): at 05:56

## 2023-01-01 RX ADMIN — Medication 25 MILLIGRAM(S): at 22:26

## 2023-01-01 RX ADMIN — Medication 10 MILLIGRAM(S): at 05:56

## 2023-01-01 RX ADMIN — Medication 0.25 MILLIGRAM(S): at 21:34

## 2023-01-01 RX ADMIN — SIMVASTATIN 40 MILLIGRAM(S): 20 TABLET, FILM COATED ORAL at 22:06

## 2023-01-01 RX ADMIN — Medication 1 GRAM(S): at 00:59

## 2023-01-01 RX ADMIN — ATORVASTATIN CALCIUM 40 MILLIGRAM(S): 80 TABLET, FILM COATED ORAL at 22:02

## 2023-01-01 RX ADMIN — Medication 40 MILLIGRAM(S): at 06:39

## 2023-01-01 RX ADMIN — ONDANSETRON 4 MILLIGRAM(S): 8 TABLET, FILM COATED ORAL at 13:10

## 2023-01-01 RX ADMIN — CEFEPIME 100 MILLIGRAM(S): 1 INJECTION, POWDER, FOR SOLUTION INTRAMUSCULAR; INTRAVENOUS at 17:36

## 2023-01-01 RX ADMIN — Medication 1000 MILLIGRAM(S): at 02:36

## 2023-01-01 RX ADMIN — Medication 1 GRAM(S): at 23:14

## 2023-01-01 RX ADMIN — Medication 975 MILLIGRAM(S): at 21:59

## 2023-01-01 RX ADMIN — Medication 3 MILLILITER(S): at 06:37

## 2023-01-01 RX ADMIN — Medication 975 MILLIGRAM(S): at 19:57

## 2023-01-01 RX ADMIN — Medication 25 MILLIGRAM(S): at 06:39

## 2023-01-01 RX ADMIN — BUDESONIDE AND FORMOTEROL FUMARATE DIHYDRATE 2 PUFF(S): 160; 4.5 AEROSOL RESPIRATORY (INHALATION) at 21:58

## 2023-01-01 RX ADMIN — Medication 3 MILLIGRAM(S): at 21:22

## 2023-01-01 RX ADMIN — Medication 5 MILLIGRAM(S): at 05:22

## 2023-01-01 RX ADMIN — BUDESONIDE AND FORMOTEROL FUMARATE DIHYDRATE 2 PUFF(S): 160; 4.5 AEROSOL RESPIRATORY (INHALATION) at 21:42

## 2023-01-01 RX ADMIN — INSULIN GLARGINE 4 UNIT(S): 100 INJECTION, SOLUTION SUBCUTANEOUS at 22:21

## 2023-01-01 RX ADMIN — Medication 3 MILLILITER(S): at 03:33

## 2023-01-01 RX ADMIN — Medication 1000 UNIT(S): at 13:28

## 2023-01-01 RX ADMIN — BUDESONIDE AND FORMOTEROL FUMARATE DIHYDRATE 2 PUFF(S): 160; 4.5 AEROSOL RESPIRATORY (INHALATION) at 10:16

## 2023-01-01 RX ADMIN — Medication 1 TABLET(S): at 08:26

## 2023-01-01 RX ADMIN — Medication 0.25 MILLIGRAM(S): at 22:06

## 2023-01-01 RX ADMIN — Medication 3 MILLILITER(S): at 15:28

## 2023-01-01 RX ADMIN — Medication 3 MILLIGRAM(S): at 22:09

## 2023-01-01 RX ADMIN — Medication 3 MILLILITER(S): at 20:20

## 2023-01-01 RX ADMIN — Medication 1 GRAM(S): at 23:55

## 2023-01-01 RX ADMIN — Medication 1 TABLET(S): at 18:19

## 2023-01-01 RX ADMIN — SEVELAMER CARBONATE 800 MILLIGRAM(S): 2400 POWDER, FOR SUSPENSION ORAL at 17:53

## 2023-01-01 RX ADMIN — Medication 1 GRAM(S): at 17:42

## 2023-01-01 RX ADMIN — GLUCAGON INJECTION, SOLUTION 1 MILLIGRAM(S): 0.5 INJECTION, SOLUTION SUBCUTANEOUS at 01:25

## 2023-01-01 RX ADMIN — ETOMIDATE 20 MILLIGRAM(S): 2 INJECTION INTRAVENOUS at 15:21

## 2023-01-01 RX ADMIN — Medication 1 GRAM(S): at 17:18

## 2023-01-01 RX ADMIN — BUDESONIDE AND FORMOTEROL FUMARATE DIHYDRATE 2 PUFF(S): 160; 4.5 AEROSOL RESPIRATORY (INHALATION) at 22:38

## 2023-01-01 RX ADMIN — Medication 25 MILLIGRAM(S): at 05:57

## 2023-01-01 RX ADMIN — ALBUTEROL 2 PUFF(S): 90 AEROSOL, METERED ORAL at 22:29

## 2023-01-01 RX ADMIN — ZOLPIDEM TARTRATE 5 MILLIGRAM(S): 10 TABLET ORAL at 22:11

## 2023-01-01 RX ADMIN — OXYCODONE HYDROCHLORIDE 5 MILLIGRAM(S): 5 TABLET ORAL at 06:39

## 2023-01-01 RX ADMIN — Medication 325 MILLIGRAM(S): at 12:30

## 2023-01-01 RX ADMIN — Medication 10 MILLIGRAM(S): at 13:28

## 2023-01-01 RX ADMIN — ERYTHROPOIETIN 20000 UNIT(S): 10000 INJECTION, SOLUTION INTRAVENOUS; SUBCUTANEOUS at 12:41

## 2023-01-01 RX ADMIN — Medication 1 MILLIGRAM(S): at 13:29

## 2023-01-01 RX ADMIN — Medication 25 MILLIGRAM(S): at 05:04

## 2023-01-01 RX ADMIN — SERTRALINE 200 MILLIGRAM(S): 25 TABLET, FILM COATED ORAL at 15:48

## 2023-01-01 RX ADMIN — Medication 1000 MILLIGRAM(S): at 15:53

## 2023-01-01 RX ADMIN — Medication 3 MILLILITER(S): at 14:54

## 2023-01-01 RX ADMIN — Medication 3 MILLILITER(S): at 09:20

## 2023-01-01 RX ADMIN — PANTOPRAZOLE SODIUM 40 MILLIGRAM(S): 20 TABLET, DELAYED RELEASE ORAL at 06:25

## 2023-01-01 RX ADMIN — Medication 5 MILLIGRAM(S): at 17:56

## 2023-01-01 RX ADMIN — Medication 1 GRAM(S): at 11:23

## 2023-01-01 RX ADMIN — SERTRALINE 200 MILLIGRAM(S): 25 TABLET, FILM COATED ORAL at 12:28

## 2023-01-01 RX ADMIN — Medication 25 MICROGRAM(S): at 05:07

## 2023-01-01 RX ADMIN — Medication 650 MILLIGRAM(S): at 01:24

## 2023-01-01 RX ADMIN — SEVELAMER CARBONATE 800 MILLIGRAM(S): 2400 POWDER, FOR SUSPENSION ORAL at 08:12

## 2023-01-01 RX ADMIN — Medication 25 MICROGRAM(S): at 06:58

## 2023-01-01 RX ADMIN — SERTRALINE 200 MILLIGRAM(S): 25 TABLET, FILM COATED ORAL at 21:15

## 2023-01-01 RX ADMIN — CHLORHEXIDINE GLUCONATE 1 APPLICATION(S): 213 SOLUTION TOPICAL at 05:16

## 2023-01-01 RX ADMIN — PANTOPRAZOLE SODIUM 40 MILLIGRAM(S): 20 TABLET, DELAYED RELEASE ORAL at 06:23

## 2023-01-01 RX ADMIN — PANTOPRAZOLE SODIUM 40 MILLIGRAM(S): 20 TABLET, DELAYED RELEASE ORAL at 07:16

## 2023-01-01 RX ADMIN — Medication 25 MILLIGRAM(S): at 18:18

## 2023-01-01 RX ADMIN — PANTOPRAZOLE SODIUM 80 MILLIGRAM(S): 20 TABLET, DELAYED RELEASE ORAL at 10:51

## 2023-01-01 RX ADMIN — Medication 12.5 MILLIGRAM(S): at 18:19

## 2023-01-01 RX ADMIN — Medication 25 MICROGRAM(S): at 06:08

## 2023-01-01 RX ADMIN — Medication 80 MILLIGRAM(S): at 06:27

## 2023-01-01 RX ADMIN — Medication 1: at 08:11

## 2023-01-01 RX ADMIN — Medication 0.25 MILLIGRAM(S): at 21:50

## 2023-01-01 RX ADMIN — Medication 3 MILLILITER(S): at 15:59

## 2023-01-01 RX ADMIN — Medication 3 MILLILITER(S): at 03:16

## 2023-01-01 RX ADMIN — ONDANSETRON 4 MILLIGRAM(S): 8 TABLET, FILM COATED ORAL at 06:53

## 2023-01-01 RX ADMIN — CHLORHEXIDINE GLUCONATE 1 APPLICATION(S): 213 SOLUTION TOPICAL at 11:21

## 2023-01-01 RX ADMIN — Medication 50 MILLILITER(S): at 12:05

## 2023-01-01 RX ADMIN — ATORVASTATIN CALCIUM 40 MILLIGRAM(S): 80 TABLET, FILM COATED ORAL at 21:54

## 2023-01-01 RX ADMIN — Medication 1 GRAM(S): at 11:37

## 2023-01-01 RX ADMIN — Medication 5 MILLIGRAM(S): at 06:26

## 2023-01-01 RX ADMIN — Medication 81 MILLIGRAM(S): at 11:43

## 2023-01-01 RX ADMIN — AMLODIPINE BESYLATE 10 MILLIGRAM(S): 2.5 TABLET ORAL at 06:54

## 2023-01-01 RX ADMIN — Medication 1 UNIT(S): at 16:44

## 2023-01-01 RX ADMIN — Medication 2: at 08:27

## 2023-01-01 RX ADMIN — PANTOPRAZOLE SODIUM 40 MILLIGRAM(S): 20 TABLET, DELAYED RELEASE ORAL at 17:08

## 2023-01-01 RX ADMIN — Medication 3 MILLILITER(S): at 20:26

## 2023-01-01 RX ADMIN — Medication 1 GRAM(S): at 23:39

## 2023-01-01 RX ADMIN — Medication 1 TABLET(S): at 17:18

## 2023-01-01 RX ADMIN — Medication 0.5 MILLIGRAM(S): at 20:43

## 2023-01-01 RX ADMIN — SEVELAMER CARBONATE 800 MILLIGRAM(S): 2400 POWDER, FOR SUSPENSION ORAL at 17:42

## 2023-01-01 RX ADMIN — AMLODIPINE BESYLATE 10 MILLIGRAM(S): 2.5 TABLET ORAL at 21:16

## 2023-01-01 RX ADMIN — BUDESONIDE AND FORMOTEROL FUMARATE DIHYDRATE 2 PUFF(S): 160; 4.5 AEROSOL RESPIRATORY (INHALATION) at 21:04

## 2023-01-01 RX ADMIN — SERTRALINE 200 MILLIGRAM(S): 25 TABLET, FILM COATED ORAL at 11:48

## 2023-01-01 RX ADMIN — ERYTHROPOIETIN 10000 UNIT(S): 10000 INJECTION, SOLUTION INTRAVENOUS; SUBCUTANEOUS at 18:15

## 2023-01-01 RX ADMIN — PANTOPRAZOLE SODIUM 40 MILLIGRAM(S): 20 TABLET, DELAYED RELEASE ORAL at 06:31

## 2023-01-01 RX ADMIN — LATANOPROST 1 DROP(S): 0.05 SOLUTION/ DROPS OPHTHALMIC; TOPICAL at 22:47

## 2023-01-01 RX ADMIN — SEVELAMER CARBONATE 800 MILLIGRAM(S): 2400 POWDER, FOR SUSPENSION ORAL at 12:18

## 2023-01-01 RX ADMIN — Medication 1 GRAM(S): at 12:26

## 2023-01-01 RX ADMIN — SODIUM ZIRCONIUM CYCLOSILICATE 10 GRAM(S): 10 POWDER, FOR SUSPENSION ORAL at 08:29

## 2023-01-01 RX ADMIN — Medication 40 MILLIGRAM(S): at 06:10

## 2023-01-01 RX ADMIN — Medication 0.25 MILLIGRAM(S): at 22:38

## 2023-01-01 RX ADMIN — Medication 1 GRAM(S): at 13:27

## 2023-01-01 RX ADMIN — OXYCODONE HYDROCHLORIDE 5 MILLIGRAM(S): 5 TABLET ORAL at 00:59

## 2023-01-01 RX ADMIN — Medication 2 MILLIGRAM(S): at 10:03

## 2023-01-01 RX ADMIN — Medication 80 MILLIGRAM(S): at 06:40

## 2023-01-01 RX ADMIN — CHLORHEXIDINE GLUCONATE 1 APPLICATION(S): 213 SOLUTION TOPICAL at 16:02

## 2023-01-01 RX ADMIN — Medication 650 MILLIGRAM(S): at 19:59

## 2023-01-01 RX ADMIN — CHLORHEXIDINE GLUCONATE 1 APPLICATION(S): 213 SOLUTION TOPICAL at 12:40

## 2023-01-01 RX ADMIN — Medication 3: at 17:57

## 2023-01-01 RX ADMIN — Medication 1000 MILLIGRAM(S): at 22:41

## 2023-01-01 RX ADMIN — Medication 125 MILLIGRAM(S): at 05:43

## 2023-01-01 RX ADMIN — Medication 975 MILLIGRAM(S): at 21:00

## 2023-01-01 RX ADMIN — Medication 25 MILLILITER(S): at 12:13

## 2023-01-01 RX ADMIN — Medication 2 MILLIGRAM(S): at 15:50

## 2023-01-01 RX ADMIN — Medication 25 MICROGRAM(S): at 06:11

## 2023-01-01 RX ADMIN — Medication 1 GRAM(S): at 13:53

## 2023-01-01 RX ADMIN — OXYCODONE HYDROCHLORIDE 5 MILLIGRAM(S): 5 TABLET ORAL at 22:04

## 2023-01-01 RX ADMIN — Medication 80 MILLIGRAM(S): at 05:18

## 2023-01-01 RX ADMIN — SERTRALINE 200 MILLIGRAM(S): 25 TABLET, FILM COATED ORAL at 12:04

## 2023-01-01 RX ADMIN — Medication 10 MILLIGRAM(S): at 08:30

## 2023-01-01 RX ADMIN — SERTRALINE 200 MILLIGRAM(S): 25 TABLET, FILM COATED ORAL at 11:19

## 2023-01-01 RX ADMIN — Medication 3 MILLILITER(S): at 20:02

## 2023-01-01 RX ADMIN — Medication 1 GRAM(S): at 18:23

## 2023-01-01 RX ADMIN — Medication 80 MILLIGRAM(S): at 12:25

## 2023-01-01 RX ADMIN — Medication 1 MILLIGRAM(S): at 12:20

## 2023-01-01 RX ADMIN — Medication 50 MILLIGRAM(S): at 05:48

## 2023-01-01 RX ADMIN — LIDOCAINE 2 PATCH: 4 CREAM TOPICAL at 13:53

## 2023-01-01 RX ADMIN — BUDESONIDE AND FORMOTEROL FUMARATE DIHYDRATE 2 PUFF(S): 160; 4.5 AEROSOL RESPIRATORY (INHALATION) at 21:15

## 2023-01-01 RX ADMIN — Medication 5 MILLIGRAM(S): at 13:45

## 2023-01-01 RX ADMIN — ERYTHROPOIETIN 20000 UNIT(S): 10000 INJECTION, SOLUTION INTRAVENOUS; SUBCUTANEOUS at 15:09

## 2023-01-01 RX ADMIN — ONDANSETRON 4 MILLIGRAM(S): 8 TABLET, FILM COATED ORAL at 17:41

## 2023-01-01 RX ADMIN — Medication 1 MILLIGRAM(S): at 12:51

## 2023-01-01 RX ADMIN — Medication 60 MILLIGRAM(S): at 17:41

## 2023-01-01 RX ADMIN — HEPARIN SODIUM 5000 UNIT(S): 5000 INJECTION INTRAVENOUS; SUBCUTANEOUS at 14:05

## 2023-01-01 RX ADMIN — BUDESONIDE AND FORMOTEROL FUMARATE DIHYDRATE 2 PUFF(S): 160; 4.5 AEROSOL RESPIRATORY (INHALATION) at 11:55

## 2023-01-01 RX ADMIN — Medication 1000 UNIT(S): at 11:48

## 2023-01-01 RX ADMIN — Medication 125 MILLIGRAM(S): at 17:09

## 2023-01-01 RX ADMIN — Medication 1 MILLIGRAM(S): at 11:38

## 2023-01-01 RX ADMIN — Medication 1 GRAM(S): at 06:04

## 2023-01-01 RX ADMIN — Medication 10 MILLIGRAM(S): at 21:15

## 2023-01-01 RX ADMIN — Medication 1: at 08:37

## 2023-01-01 RX ADMIN — MUPIROCIN 1 APPLICATION(S): 20 OINTMENT TOPICAL at 06:21

## 2023-01-01 RX ADMIN — Medication 0.5 MILLIGRAM(S): at 20:27

## 2023-01-01 RX ADMIN — INSULIN GLARGINE 4 UNIT(S): 100 INJECTION, SOLUTION SUBCUTANEOUS at 21:52

## 2023-01-01 RX ADMIN — Medication 25 MICROGRAM(S): at 06:01

## 2023-01-01 RX ADMIN — Medication 25 MILLIGRAM(S): at 17:22

## 2023-01-01 RX ADMIN — Medication 10 MILLIGRAM(S): at 05:23

## 2023-01-01 RX ADMIN — Medication 2 MILLIGRAM(S): at 11:58

## 2023-01-01 RX ADMIN — ATORVASTATIN CALCIUM 40 MILLIGRAM(S): 80 TABLET, FILM COATED ORAL at 21:56

## 2023-01-01 RX ADMIN — Medication 650 MILLIGRAM(S): at 02:25

## 2023-01-01 RX ADMIN — Medication 1 GRAM(S): at 06:37

## 2023-01-01 RX ADMIN — ALBUTEROL 2 PUFF(S): 90 AEROSOL, METERED ORAL at 21:20

## 2023-01-01 RX ADMIN — Medication 25 MILLIGRAM(S): at 14:23

## 2023-01-01 RX ADMIN — Medication 3 MILLILITER(S): at 09:28

## 2023-01-01 RX ADMIN — ONDANSETRON 4 MILLIGRAM(S): 8 TABLET, FILM COATED ORAL at 22:43

## 2023-01-01 RX ADMIN — SEVELAMER CARBONATE 800 MILLIGRAM(S): 2400 POWDER, FOR SUSPENSION ORAL at 17:08

## 2023-01-01 RX ADMIN — Medication 1000 MILLIGRAM(S): at 17:05

## 2023-01-01 RX ADMIN — BUDESONIDE AND FORMOTEROL FUMARATE DIHYDRATE 2 PUFF(S): 160; 4.5 AEROSOL RESPIRATORY (INHALATION) at 21:24

## 2023-01-01 RX ADMIN — Medication 10 MILLIGRAM(S): at 14:59

## 2023-01-01 RX ADMIN — Medication 25 MILLIGRAM(S): at 06:25

## 2023-01-01 RX ADMIN — Medication 3 MILLILITER(S): at 20:10

## 2023-01-01 RX ADMIN — ALBUTEROL 2 PUFF(S): 90 AEROSOL, METERED ORAL at 21:39

## 2023-01-01 RX ADMIN — Medication 1 GRAM(S): at 17:31

## 2023-01-01 RX ADMIN — Medication 5 MILLIGRAM(S): at 05:48

## 2023-01-01 RX ADMIN — Medication 25 MILLIGRAM(S): at 05:47

## 2023-01-01 RX ADMIN — Medication 3: at 17:20

## 2023-01-01 RX ADMIN — SEVELAMER CARBONATE 800 MILLIGRAM(S): 2400 POWDER, FOR SUSPENSION ORAL at 17:55

## 2023-01-01 RX ADMIN — ATORVASTATIN CALCIUM 40 MILLIGRAM(S): 80 TABLET, FILM COATED ORAL at 21:14

## 2023-01-01 RX ADMIN — Medication 25 MILLIGRAM(S): at 13:59

## 2023-01-01 RX ADMIN — SEVELAMER CARBONATE 800 MILLIGRAM(S): 2400 POWDER, FOR SUSPENSION ORAL at 17:48

## 2023-01-01 RX ADMIN — OXYCODONE HYDROCHLORIDE 5 MILLIGRAM(S): 5 TABLET ORAL at 21:14

## 2023-01-01 RX ADMIN — AMLODIPINE BESYLATE 10 MILLIGRAM(S): 2.5 TABLET ORAL at 07:01

## 2023-01-01 RX ADMIN — Medication 975 MILLIGRAM(S): at 02:48

## 2023-01-01 RX ADMIN — ONDANSETRON 4 MILLIGRAM(S): 8 TABLET, FILM COATED ORAL at 06:52

## 2023-01-01 RX ADMIN — Medication 1000 MILLIGRAM(S): at 20:02

## 2023-01-01 RX ADMIN — Medication 3 MILLILITER(S): at 03:31

## 2023-01-01 RX ADMIN — Medication 3 MILLILITER(S): at 20:32

## 2023-01-01 RX ADMIN — Medication 1 GRAM(S): at 02:37

## 2023-01-01 RX ADMIN — Medication 10 MILLIGRAM(S): at 21:42

## 2023-01-01 RX ADMIN — Medication 1 GRAM(S): at 19:17

## 2023-01-01 RX ADMIN — Medication 25 MILLIGRAM(S): at 14:45

## 2023-01-01 RX ADMIN — CHLORHEXIDINE GLUCONATE 1 APPLICATION(S): 213 SOLUTION TOPICAL at 13:47

## 2023-01-01 RX ADMIN — Medication 1 PATCH: at 13:17

## 2023-01-01 RX ADMIN — PANTOPRAZOLE SODIUM 40 MILLIGRAM(S): 20 TABLET, DELAYED RELEASE ORAL at 06:26

## 2023-01-01 RX ADMIN — Medication 50 MILLIGRAM(S): at 05:13

## 2023-01-01 RX ADMIN — Medication 25 MILLIGRAM(S): at 05:28

## 2023-01-01 RX ADMIN — Medication 25 MILLIGRAM(S): at 05:23

## 2023-01-01 RX ADMIN — Medication 3: at 17:31

## 2023-01-01 RX ADMIN — ALBUTEROL 2 PUFF(S): 90 AEROSOL, METERED ORAL at 22:58

## 2023-01-01 RX ADMIN — POLYETHYLENE GLYCOL 3350 17 GRAM(S): 17 POWDER, FOR SOLUTION ORAL at 11:31

## 2023-01-01 RX ADMIN — TIOTROPIUM BROMIDE 2 PUFF(S): 18 CAPSULE ORAL; RESPIRATORY (INHALATION) at 11:36

## 2023-01-01 RX ADMIN — ALBUTEROL 2 PUFF(S): 90 AEROSOL, METERED ORAL at 03:56

## 2023-01-01 RX ADMIN — OXYCODONE HYDROCHLORIDE 2.5 MILLIGRAM(S): 5 TABLET ORAL at 11:00

## 2023-01-01 RX ADMIN — Medication 25 MILLIGRAM(S): at 13:18

## 2023-01-01 RX ADMIN — SEVELAMER CARBONATE 800 MILLIGRAM(S): 2400 POWDER, FOR SUSPENSION ORAL at 17:24

## 2023-01-01 RX ADMIN — Medication 1 GRAM(S): at 14:41

## 2023-01-01 RX ADMIN — Medication 2 MILLIGRAM(S): at 09:24

## 2023-01-01 RX ADMIN — OXYCODONE HYDROCHLORIDE 5 MILLIGRAM(S): 5 TABLET ORAL at 22:11

## 2023-01-01 RX ADMIN — Medication 50 MILLIGRAM(S): at 05:59

## 2023-01-01 RX ADMIN — CEFEPIME 100 MILLIGRAM(S): 1 INJECTION, POWDER, FOR SOLUTION INTRAMUSCULAR; INTRAVENOUS at 14:43

## 2023-01-01 RX ADMIN — Medication 25 MILLIGRAM(S): at 07:16

## 2023-01-01 RX ADMIN — Medication 10 MILLIGRAM(S): at 21:12

## 2023-01-01 RX ADMIN — PREGABALIN 1000 MICROGRAM(S): 225 CAPSULE ORAL at 13:28

## 2023-01-01 RX ADMIN — INSULIN GLARGINE 4 UNIT(S): 100 INJECTION, SOLUTION SUBCUTANEOUS at 22:10

## 2023-01-01 RX ADMIN — Medication 10 MILLIGRAM(S): at 06:06

## 2023-01-01 RX ADMIN — AMLODIPINE BESYLATE 10 MILLIGRAM(S): 2.5 TABLET ORAL at 05:36

## 2023-01-01 RX ADMIN — Medication 1 GRAM(S): at 00:19

## 2023-01-01 RX ADMIN — Medication 500 MILLIGRAM(S): at 07:16

## 2023-01-01 RX ADMIN — Medication 3 MILLIGRAM(S): at 23:49

## 2023-01-01 RX ADMIN — ALBUTEROL 2 PUFF(S): 90 AEROSOL, METERED ORAL at 14:05

## 2023-01-01 RX ADMIN — Medication 3 MILLILITER(S): at 20:15

## 2023-01-01 RX ADMIN — ERYTHROPOIETIN 20000 UNIT(S): 10000 INJECTION, SOLUTION INTRAVENOUS; SUBCUTANEOUS at 16:53

## 2023-01-01 RX ADMIN — Medication 25 MICROGRAM(S): at 05:54

## 2023-01-01 RX ADMIN — BUDESONIDE AND FORMOTEROL FUMARATE DIHYDRATE 2 PUFF(S): 160; 4.5 AEROSOL RESPIRATORY (INHALATION) at 21:43

## 2023-01-01 RX ADMIN — Medication 1 MILLIGRAM(S): at 14:40

## 2023-01-01 RX ADMIN — Medication 100 MILLIGRAM(S): at 14:31

## 2023-01-01 RX ADMIN — ERYTHROPOIETIN 20000 UNIT(S): 10000 INJECTION, SOLUTION INTRAVENOUS; SUBCUTANEOUS at 14:13

## 2023-01-01 RX ADMIN — Medication 3 MILLILITER(S): at 00:23

## 2023-01-01 RX ADMIN — Medication 5 MILLIGRAM(S): at 06:03

## 2023-01-01 RX ADMIN — Medication 0.25 MILLIGRAM(S): at 22:29

## 2023-01-01 RX ADMIN — Medication 5 MILLIGRAM(S): at 22:27

## 2023-01-01 RX ADMIN — MUPIROCIN 1 APPLICATION(S): 20 OINTMENT TOPICAL at 19:41

## 2023-01-01 RX ADMIN — Medication 1 TABLET(S): at 08:46

## 2023-01-01 RX ADMIN — Medication 2: at 21:43

## 2023-01-01 RX ADMIN — Medication 3 MILLILITER(S): at 20:24

## 2023-01-01 RX ADMIN — Medication 325 MILLIGRAM(S): at 17:44

## 2023-01-01 RX ADMIN — Medication 125 MILLIGRAM(S): at 23:26

## 2023-01-01 RX ADMIN — Medication 1000 MILLIGRAM(S): at 02:22

## 2023-01-01 RX ADMIN — Medication 50 MILLIGRAM(S): at 06:18

## 2023-01-01 RX ADMIN — Medication 2 MILLIGRAM(S): at 13:22

## 2023-01-01 RX ADMIN — LIDOCAINE 1 PATCH: 4 CREAM TOPICAL at 06:41

## 2023-01-01 RX ADMIN — BUDESONIDE AND FORMOTEROL FUMARATE DIHYDRATE 2 PUFF(S): 160; 4.5 AEROSOL RESPIRATORY (INHALATION) at 22:36

## 2023-01-01 RX ADMIN — Medication 3 MILLIGRAM(S): at 20:26

## 2023-01-01 RX ADMIN — Medication 4: at 17:09

## 2023-01-01 RX ADMIN — PANTOPRAZOLE SODIUM 40 MILLIGRAM(S): 20 TABLET, DELAYED RELEASE ORAL at 18:59

## 2023-01-01 RX ADMIN — Medication 25 MILLIGRAM(S): at 05:54

## 2023-01-01 RX ADMIN — Medication 1 MILLIGRAM(S): at 11:54

## 2023-01-01 RX ADMIN — PANTOPRAZOLE SODIUM 40 MILLIGRAM(S): 20 TABLET, DELAYED RELEASE ORAL at 22:09

## 2023-01-01 RX ADMIN — Medication 50 MILLIGRAM(S): at 06:38

## 2023-01-01 RX ADMIN — Medication 25 MILLIGRAM(S): at 05:18

## 2023-01-01 RX ADMIN — BUDESONIDE AND FORMOTEROL FUMARATE DIHYDRATE 2 PUFF(S): 160; 4.5 AEROSOL RESPIRATORY (INHALATION) at 12:41

## 2023-01-01 RX ADMIN — AMLODIPINE BESYLATE 10 MILLIGRAM(S): 2.5 TABLET ORAL at 06:25

## 2023-01-01 RX ADMIN — Medication 1000 MILLIGRAM(S): at 18:46

## 2023-01-01 RX ADMIN — Medication 100 MILLIGRAM(S): at 22:47

## 2023-01-01 RX ADMIN — Medication 1 GRAM(S): at 11:27

## 2023-01-01 RX ADMIN — Medication 3 MILLIGRAM(S): at 22:30

## 2023-01-01 RX ADMIN — PREGABALIN 1000 MICROGRAM(S): 225 CAPSULE ORAL at 12:30

## 2023-01-01 RX ADMIN — BUDESONIDE AND FORMOTEROL FUMARATE DIHYDRATE 2 PUFF(S): 160; 4.5 AEROSOL RESPIRATORY (INHALATION) at 11:37

## 2023-01-01 RX ADMIN — ALBUTEROL 2 PUFF(S): 90 AEROSOL, METERED ORAL at 22:05

## 2023-01-01 RX ADMIN — Medication 1 GRAM(S): at 11:54

## 2023-01-01 RX ADMIN — Medication 650 MILLIGRAM(S): at 04:50

## 2023-01-01 RX ADMIN — Medication 40 MILLIGRAM(S): at 05:58

## 2023-01-01 RX ADMIN — Medication 1 MILLIGRAM(S): at 15:46

## 2023-01-01 RX ADMIN — CHLORHEXIDINE GLUCONATE 1 APPLICATION(S): 213 SOLUTION TOPICAL at 14:24

## 2023-01-01 RX ADMIN — Medication 1 GRAM(S): at 06:31

## 2023-01-01 RX ADMIN — PANTOPRAZOLE SODIUM 40 MILLIGRAM(S): 20 TABLET, DELAYED RELEASE ORAL at 05:10

## 2023-01-01 RX ADMIN — PANTOPRAZOLE SODIUM 40 MILLIGRAM(S): 20 TABLET, DELAYED RELEASE ORAL at 05:20

## 2023-01-01 RX ADMIN — CHLORHEXIDINE GLUCONATE 1 APPLICATION(S): 213 SOLUTION TOPICAL at 12:23

## 2023-01-01 RX ADMIN — Medication 81 MILLIGRAM(S): at 13:52

## 2023-01-01 RX ADMIN — Medication 1 TABLET(S): at 12:20

## 2023-01-01 RX ADMIN — Medication 80 MILLIGRAM(S): at 05:30

## 2023-01-01 RX ADMIN — BUDESONIDE AND FORMOTEROL FUMARATE DIHYDRATE 2 PUFF(S): 160; 4.5 AEROSOL RESPIRATORY (INHALATION) at 22:27

## 2023-01-01 RX ADMIN — Medication 975 MILLIGRAM(S): at 21:24

## 2023-01-01 RX ADMIN — Medication 10 MILLIGRAM(S): at 05:54

## 2023-01-01 RX ADMIN — SEVELAMER CARBONATE 800 MILLIGRAM(S): 2400 POWDER, FOR SUSPENSION ORAL at 11:47

## 2023-01-01 RX ADMIN — Medication 5 MILLIGRAM(S): at 21:34

## 2023-01-01 RX ADMIN — PANTOPRAZOLE SODIUM 40 MILLIGRAM(S): 20 TABLET, DELAYED RELEASE ORAL at 18:21

## 2023-01-01 RX ADMIN — Medication 1 TABLET(S): at 17:31

## 2023-01-01 RX ADMIN — Medication 0.5 MILLIGRAM(S): at 11:14

## 2023-01-01 RX ADMIN — Medication 3 MILLILITER(S): at 14:39

## 2023-01-01 RX ADMIN — Medication 650 MILLIGRAM(S): at 03:00

## 2023-01-01 RX ADMIN — Medication 1 GRAM(S): at 05:15

## 2023-01-01 RX ADMIN — BUDESONIDE AND FORMOTEROL FUMARATE DIHYDRATE 2 PUFF(S): 160; 4.5 AEROSOL RESPIRATORY (INHALATION) at 14:45

## 2023-01-01 RX ADMIN — Medication 25 MILLIGRAM(S): at 04:24

## 2023-01-01 RX ADMIN — Medication 650 MILLIGRAM(S): at 23:08

## 2023-01-01 RX ADMIN — Medication 80 MILLIGRAM(S): at 06:12

## 2023-01-01 RX ADMIN — Medication 25 MILLIGRAM(S): at 22:21

## 2023-01-01 RX ADMIN — OLANZAPINE 2.5 MILLIGRAM(S): 15 TABLET, FILM COATED ORAL at 01:15

## 2023-01-01 RX ADMIN — ERYTHROPOIETIN 20000 UNIT(S): 10000 INJECTION, SOLUTION INTRAVENOUS; SUBCUTANEOUS at 16:36

## 2023-01-01 RX ADMIN — LIDOCAINE 2 PATCH: 4 CREAM TOPICAL at 19:35

## 2023-01-01 RX ADMIN — Medication 1: at 22:49

## 2023-01-01 RX ADMIN — Medication 3 MILLIGRAM(S): at 21:36

## 2023-01-01 RX ADMIN — Medication 10 MILLIGRAM(S): at 05:15

## 2023-01-01 RX ADMIN — INSULIN GLARGINE 4 UNIT(S): 100 INJECTION, SOLUTION SUBCUTANEOUS at 22:38

## 2023-01-01 RX ADMIN — Medication 125 MILLIGRAM(S): at 17:20

## 2023-01-01 RX ADMIN — Medication 1 GRAM(S): at 23:07

## 2023-01-01 RX ADMIN — SIMVASTATIN 40 MILLIGRAM(S): 20 TABLET, FILM COATED ORAL at 21:31

## 2023-01-01 RX ADMIN — ERYTHROPOIETIN 20000 UNIT(S): 10000 INJECTION, SOLUTION INTRAVENOUS; SUBCUTANEOUS at 17:34

## 2023-01-01 RX ADMIN — Medication 1 MILLIGRAM(S): at 11:26

## 2023-01-01 RX ADMIN — SIMVASTATIN 40 MILLIGRAM(S): 20 TABLET, FILM COATED ORAL at 23:24

## 2023-01-01 RX ADMIN — Medication 1 GRAM(S): at 05:33

## 2023-01-01 RX ADMIN — Medication 81 MILLIGRAM(S): at 14:46

## 2023-01-01 RX ADMIN — Medication 3 MILLILITER(S): at 07:46

## 2023-01-01 RX ADMIN — Medication 81 MILLIGRAM(S): at 12:24

## 2023-01-01 RX ADMIN — Medication 81 MILLIGRAM(S): at 15:45

## 2023-01-01 RX ADMIN — PANTOPRAZOLE SODIUM 40 MILLIGRAM(S): 20 TABLET, DELAYED RELEASE ORAL at 06:03

## 2023-01-01 RX ADMIN — BUDESONIDE AND FORMOTEROL FUMARATE DIHYDRATE 2 PUFF(S): 160; 4.5 AEROSOL RESPIRATORY (INHALATION) at 21:56

## 2023-01-01 RX ADMIN — Medication 2 MILLIGRAM(S): at 11:48

## 2023-01-01 RX ADMIN — Medication 0.5 MILLIGRAM(S): at 05:36

## 2023-01-01 RX ADMIN — ONDANSETRON 4 MILLIGRAM(S): 8 TABLET, FILM COATED ORAL at 21:21

## 2023-01-01 RX ADMIN — ATORVASTATIN CALCIUM 40 MILLIGRAM(S): 80 TABLET, FILM COATED ORAL at 21:33

## 2023-01-01 RX ADMIN — AMLODIPINE BESYLATE 10 MILLIGRAM(S): 2.5 TABLET ORAL at 05:57

## 2023-01-01 RX ADMIN — Medication 100 MILLIGRAM(S): at 22:25

## 2023-01-01 RX ADMIN — Medication 125 MILLIGRAM(S): at 17:44

## 2023-01-01 RX ADMIN — Medication 3 MILLIGRAM(S): at 22:48

## 2023-01-01 RX ADMIN — Medication 1 MILLIGRAM(S): at 12:04

## 2023-01-01 RX ADMIN — Medication 1 GRAM(S): at 11:48

## 2023-01-01 RX ADMIN — Medication 650 MILLIGRAM(S): at 11:00

## 2023-01-01 RX ADMIN — CHLORHEXIDINE GLUCONATE 1 APPLICATION(S): 213 SOLUTION TOPICAL at 14:47

## 2023-01-01 RX ADMIN — BUDESONIDE AND FORMOTEROL FUMARATE DIHYDRATE 2 PUFF(S): 160; 4.5 AEROSOL RESPIRATORY (INHALATION) at 10:19

## 2023-01-01 RX ADMIN — Medication 25 MILLIGRAM(S): at 21:29

## 2023-01-01 RX ADMIN — Medication 10 MILLIGRAM(S): at 22:26

## 2023-01-01 RX ADMIN — MORPHINE SULFATE 2 MILLIGRAM(S): 50 CAPSULE, EXTENDED RELEASE ORAL at 17:45

## 2023-01-01 RX ADMIN — Medication 3 MILLILITER(S): at 11:05

## 2023-01-01 RX ADMIN — Medication 1000 MILLIGRAM(S): at 06:37

## 2023-01-01 RX ADMIN — CHLORHEXIDINE GLUCONATE 1 APPLICATION(S): 213 SOLUTION TOPICAL at 14:16

## 2023-01-01 RX ADMIN — Medication 500 MILLIGRAM(S): at 13:41

## 2023-01-01 RX ADMIN — ERYTHROPOIETIN 20000 UNIT(S): 10000 INJECTION, SOLUTION INTRAVENOUS; SUBCUTANEOUS at 17:36

## 2023-01-01 RX ADMIN — Medication 1 GRAM(S): at 23:48

## 2023-01-01 RX ADMIN — Medication 25 MICROGRAM(S): at 06:18

## 2023-01-01 RX ADMIN — Medication 5 MILLIGRAM(S): at 14:26

## 2023-01-01 RX ADMIN — PANTOPRAZOLE SODIUM 40 MILLIGRAM(S): 20 TABLET, DELAYED RELEASE ORAL at 17:55

## 2023-01-01 RX ADMIN — PANTOPRAZOLE SODIUM 40 MILLIGRAM(S): 20 TABLET, DELAYED RELEASE ORAL at 09:41

## 2023-01-01 RX ADMIN — SEVELAMER CARBONATE 800 MILLIGRAM(S): 2400 POWDER, FOR SUSPENSION ORAL at 08:43

## 2023-01-01 RX ADMIN — Medication 1 GRAM(S): at 23:12

## 2023-01-01 RX ADMIN — CHLORHEXIDINE GLUCONATE 1 APPLICATION(S): 213 SOLUTION TOPICAL at 18:17

## 2023-01-01 RX ADMIN — Medication 81 MILLIGRAM(S): at 14:33

## 2023-01-01 RX ADMIN — Medication 81 MILLIGRAM(S): at 12:51

## 2023-01-01 RX ADMIN — Medication 25 MICROGRAM(S): at 05:36

## 2023-01-01 RX ADMIN — INSULIN GLARGINE 4 UNIT(S): 100 INJECTION, SOLUTION SUBCUTANEOUS at 22:49

## 2023-01-01 RX ADMIN — CHLORHEXIDINE GLUCONATE 1 APPLICATION(S): 213 SOLUTION TOPICAL at 11:43

## 2023-01-01 RX ADMIN — ERYTHROPOIETIN 20000 UNIT(S): 10000 INJECTION, SOLUTION INTRAVENOUS; SUBCUTANEOUS at 13:09

## 2023-01-01 RX ADMIN — LIDOCAINE 1 PATCH: 4 CREAM TOPICAL at 19:51

## 2023-01-01 RX ADMIN — Medication 25 MILLIGRAM(S): at 05:36

## 2023-01-01 RX ADMIN — Medication 125 MILLIGRAM(S): at 23:15

## 2023-01-01 RX ADMIN — ERYTHROPOIETIN 20000 UNIT(S): 10000 INJECTION, SOLUTION INTRAVENOUS; SUBCUTANEOUS at 16:39

## 2023-01-01 RX ADMIN — Medication 5 MILLIGRAM(S): at 06:19

## 2023-01-01 RX ADMIN — SIMVASTATIN 40 MILLIGRAM(S): 20 TABLET, FILM COATED ORAL at 21:55

## 2023-01-01 RX ADMIN — PANTOPRAZOLE SODIUM 40 MILLIGRAM(S): 20 TABLET, DELAYED RELEASE ORAL at 18:03

## 2023-01-01 RX ADMIN — Medication 0.5 MILLIGRAM(S): at 20:28

## 2023-01-01 RX ADMIN — Medication 81 MILLIGRAM(S): at 12:16

## 2023-01-01 RX ADMIN — Medication 81 MILLIGRAM(S): at 12:15

## 2023-01-01 RX ADMIN — INSULIN GLARGINE 4 UNIT(S): 100 INJECTION, SOLUTION SUBCUTANEOUS at 21:44

## 2023-01-01 RX ADMIN — AMLODIPINE BESYLATE 10 MILLIGRAM(S): 2.5 TABLET ORAL at 05:07

## 2023-01-01 RX ADMIN — SEVELAMER CARBONATE 800 MILLIGRAM(S): 2400 POWDER, FOR SUSPENSION ORAL at 18:17

## 2023-01-01 RX ADMIN — Medication 1000 UNIT(S): at 12:30

## 2023-01-01 RX ADMIN — SIMVASTATIN 40 MILLIGRAM(S): 20 TABLET, FILM COATED ORAL at 21:15

## 2023-01-01 RX ADMIN — INSULIN GLARGINE 4 UNIT(S): 100 INJECTION, SOLUTION SUBCUTANEOUS at 21:42

## 2023-01-01 RX ADMIN — Medication 81 MILLIGRAM(S): at 11:24

## 2023-01-01 RX ADMIN — Medication 3 MILLILITER(S): at 17:04

## 2023-01-01 RX ADMIN — Medication 3 MILLILITER(S): at 20:30

## 2023-01-01 RX ADMIN — Medication 25 MILLIGRAM(S): at 18:05

## 2023-01-01 RX ADMIN — SERTRALINE 200 MILLIGRAM(S): 25 TABLET, FILM COATED ORAL at 11:23

## 2023-01-01 RX ADMIN — Medication 1 PATCH: at 13:29

## 2023-01-01 RX ADMIN — AMLODIPINE BESYLATE 10 MILLIGRAM(S): 2.5 TABLET ORAL at 06:10

## 2023-01-01 RX ADMIN — Medication 1 GRAM(S): at 11:53

## 2023-01-01 RX ADMIN — Medication 1000 MILLIGRAM(S): at 18:16

## 2023-01-01 RX ADMIN — BUDESONIDE AND FORMOTEROL FUMARATE DIHYDRATE 2 PUFF(S): 160; 4.5 AEROSOL RESPIRATORY (INHALATION) at 09:07

## 2023-01-01 RX ADMIN — Medication 1 TABLET(S): at 06:09

## 2023-01-01 RX ADMIN — Medication 650 MILLIGRAM(S): at 23:50

## 2023-01-01 RX ADMIN — BUDESONIDE AND FORMOTEROL FUMARATE DIHYDRATE 2 PUFF(S): 160; 4.5 AEROSOL RESPIRATORY (INHALATION) at 12:39

## 2023-01-01 RX ADMIN — Medication 650 MILLIGRAM(S): at 22:07

## 2023-01-01 RX ADMIN — BUDESONIDE AND FORMOTEROL FUMARATE DIHYDRATE 2 PUFF(S): 160; 4.5 AEROSOL RESPIRATORY (INHALATION) at 21:12

## 2023-01-01 RX ADMIN — Medication 1 PATCH: at 11:52

## 2023-01-01 RX ADMIN — Medication 5 MILLIGRAM(S): at 06:35

## 2023-01-01 RX ADMIN — Medication 325 MILLIGRAM(S): at 11:38

## 2023-01-01 RX ADMIN — Medication 125 MILLIGRAM(S): at 23:29

## 2023-01-01 RX ADMIN — SEVELAMER CARBONATE 800 MILLIGRAM(S): 2400 POWDER, FOR SUSPENSION ORAL at 08:26

## 2023-01-01 RX ADMIN — BUDESONIDE AND FORMOTEROL FUMARATE DIHYDRATE 2 PUFF(S): 160; 4.5 AEROSOL RESPIRATORY (INHALATION) at 12:14

## 2023-01-01 RX ADMIN — Medication 1 TABLET(S): at 18:17

## 2023-01-01 RX ADMIN — Medication 3 MILLILITER(S): at 03:00

## 2023-01-01 RX ADMIN — LIDOCAINE 1 PATCH: 4 CREAM TOPICAL at 19:19

## 2023-01-01 RX ADMIN — Medication 25 MILLIGRAM(S): at 22:25

## 2023-01-01 RX ADMIN — Medication 40 MILLIGRAM(S): at 06:29

## 2023-01-01 RX ADMIN — Medication 1 GRAM(S): at 17:10

## 2023-01-01 RX ADMIN — Medication 10 MILLIGRAM(S): at 14:33

## 2023-01-01 RX ADMIN — Medication 1 GRAM(S): at 18:05

## 2023-01-01 RX ADMIN — ALBUTEROL 2 PUFF(S): 90 AEROSOL, METERED ORAL at 03:45

## 2023-01-01 RX ADMIN — LIDOCAINE 2 PATCH: 4 CREAM TOPICAL at 20:23

## 2023-01-01 RX ADMIN — Medication 3 MILLILITER(S): at 15:00

## 2023-01-01 RX ADMIN — Medication 25 MILLIGRAM(S): at 05:05

## 2023-01-01 RX ADMIN — Medication 81 MILLIGRAM(S): at 13:28

## 2023-01-01 RX ADMIN — Medication 3 MILLIGRAM(S): at 21:26

## 2023-01-01 RX ADMIN — Medication 1 GRAM(S): at 14:33

## 2023-01-01 RX ADMIN — Medication 0.5 MILLIGRAM(S): at 20:25

## 2023-01-01 RX ADMIN — SERTRALINE 50 MILLIGRAM(S): 25 TABLET, FILM COATED ORAL at 12:25

## 2023-01-01 RX ADMIN — Medication 25 MILLIGRAM(S): at 21:45

## 2023-01-01 RX ADMIN — CHLORHEXIDINE GLUCONATE 1 APPLICATION(S): 213 SOLUTION TOPICAL at 16:00

## 2023-01-01 RX ADMIN — Medication 1 TABLET(S): at 17:21

## 2023-01-01 RX ADMIN — Medication 25 MILLIGRAM(S): at 17:18

## 2023-01-01 RX ADMIN — SERTRALINE 200 MILLIGRAM(S): 25 TABLET, FILM COATED ORAL at 14:45

## 2023-01-01 RX ADMIN — BUDESONIDE AND FORMOTEROL FUMARATE DIHYDRATE 2 PUFF(S): 160; 4.5 AEROSOL RESPIRATORY (INHALATION) at 11:38

## 2023-01-01 RX ADMIN — Medication 1 UNIT(S): at 17:08

## 2023-01-01 RX ADMIN — PANTOPRAZOLE SODIUM 40 MILLIGRAM(S): 20 TABLET, DELAYED RELEASE ORAL at 17:39

## 2023-01-01 RX ADMIN — Medication 3 MILLILITER(S): at 08:14

## 2023-01-01 RX ADMIN — LIDOCAINE 2 PATCH: 4 CREAM TOPICAL at 22:09

## 2023-01-01 RX ADMIN — Medication 1 GRAM(S): at 00:08

## 2023-01-01 RX ADMIN — Medication 3 MILLILITER(S): at 15:05

## 2023-01-01 RX ADMIN — BUDESONIDE AND FORMOTEROL FUMARATE DIHYDRATE 2 PUFF(S): 160; 4.5 AEROSOL RESPIRATORY (INHALATION) at 10:46

## 2023-01-01 RX ADMIN — ONDANSETRON 4 MILLIGRAM(S): 8 TABLET, FILM COATED ORAL at 09:04

## 2023-01-01 RX ADMIN — Medication 1 GRAM(S): at 06:57

## 2023-01-01 RX ADMIN — Medication 3 MILLILITER(S): at 15:20

## 2023-01-01 RX ADMIN — Medication 25 MILLIGRAM(S): at 06:08

## 2023-01-01 RX ADMIN — Medication 325 MILLIGRAM(S): at 12:55

## 2023-01-01 RX ADMIN — Medication 25 MILLIGRAM(S): at 18:35

## 2023-01-01 RX ADMIN — AMLODIPINE BESYLATE 10 MILLIGRAM(S): 2.5 TABLET ORAL at 05:23

## 2023-01-01 RX ADMIN — Medication 5 MILLIGRAM(S): at 17:54

## 2023-01-01 RX ADMIN — CEFEPIME 100 MILLIGRAM(S): 1 INJECTION, POWDER, FOR SOLUTION INTRAMUSCULAR; INTRAVENOUS at 20:38

## 2023-01-01 RX ADMIN — Medication 10 MILLIGRAM(S): at 06:39

## 2023-01-01 RX ADMIN — SEVELAMER CARBONATE 800 MILLIGRAM(S): 2400 POWDER, FOR SUSPENSION ORAL at 17:15

## 2023-01-01 RX ADMIN — Medication 1: at 11:58

## 2023-01-01 RX ADMIN — SEVELAMER CARBONATE 800 MILLIGRAM(S): 2400 POWDER, FOR SUSPENSION ORAL at 13:20

## 2023-01-01 RX ADMIN — ALBUTEROL 2 PUFF(S): 90 AEROSOL, METERED ORAL at 08:21

## 2023-01-01 RX ADMIN — SODIUM ZIRCONIUM CYCLOSILICATE 10 GRAM(S): 10 POWDER, FOR SUSPENSION ORAL at 18:06

## 2023-01-01 RX ADMIN — ERYTHROPOIETIN 20000 UNIT(S): 10000 INJECTION, SOLUTION INTRAVENOUS; SUBCUTANEOUS at 11:07

## 2023-01-01 RX ADMIN — Medication 15 GRAM(S): at 12:26

## 2023-01-01 RX ADMIN — Medication 1000 MILLIGRAM(S): at 02:02

## 2023-01-01 RX ADMIN — Medication 3 MILLILITER(S): at 20:07

## 2023-01-01 RX ADMIN — Medication 3 MILLILITER(S): at 08:55

## 2023-01-01 RX ADMIN — Medication 125 MILLIGRAM(S): at 06:00

## 2023-01-01 RX ADMIN — OXYCODONE HYDROCHLORIDE 5 MILLIGRAM(S): 5 TABLET ORAL at 23:00

## 2023-01-01 RX ADMIN — OXYCODONE HYDROCHLORIDE 5 MILLIGRAM(S): 5 TABLET ORAL at 22:30

## 2023-01-01 RX ADMIN — ERYTHROPOIETIN 20000 UNIT(S): 10000 INJECTION, SOLUTION INTRAVENOUS; SUBCUTANEOUS at 17:55

## 2023-01-01 RX ADMIN — Medication 25 MILLIGRAM(S): at 05:53

## 2023-01-01 RX ADMIN — Medication 325 MILLIGRAM(S): at 12:26

## 2023-01-01 RX ADMIN — Medication 25 MILLIGRAM(S): at 17:14

## 2023-01-01 RX ADMIN — Medication 650 MILLIGRAM(S): at 00:01

## 2023-01-01 RX ADMIN — ERYTHROPOIETIN 4000 UNIT(S): 10000 INJECTION, SOLUTION INTRAVENOUS; SUBCUTANEOUS at 12:13

## 2023-01-01 RX ADMIN — Medication 25 MILLIGRAM(S): at 15:18

## 2023-01-01 RX ADMIN — PANTOPRAZOLE SODIUM 40 MILLIGRAM(S): 20 TABLET, DELAYED RELEASE ORAL at 05:58

## 2023-01-01 RX ADMIN — Medication 1000 MILLIGRAM(S): at 16:32

## 2023-01-01 RX ADMIN — Medication 0.25 MILLIGRAM(S): at 23:22

## 2023-01-01 RX ADMIN — Medication 12.5 GRAM(S): at 07:45

## 2023-01-01 RX ADMIN — ALBUTEROL 2 PUFF(S): 90 AEROSOL, METERED ORAL at 22:48

## 2023-01-01 RX ADMIN — Medication 3 MILLILITER(S): at 12:16

## 2023-01-01 RX ADMIN — Medication 0.25 MILLIGRAM(S): at 21:16

## 2023-01-01 RX ADMIN — AMLODIPINE BESYLATE 10 MILLIGRAM(S): 2.5 TABLET ORAL at 06:39

## 2023-01-01 RX ADMIN — Medication 25 MILLIGRAM(S): at 05:15

## 2023-01-01 RX ADMIN — Medication 80 MILLIGRAM(S): at 05:05

## 2023-01-01 RX ADMIN — SENNA PLUS 2 TABLET(S): 8.6 TABLET ORAL at 21:29

## 2023-01-01 RX ADMIN — Medication 325 MILLIGRAM(S): at 17:17

## 2023-01-01 RX ADMIN — BUDESONIDE AND FORMOTEROL FUMARATE DIHYDRATE 2 PUFF(S): 160; 4.5 AEROSOL RESPIRATORY (INHALATION) at 22:26

## 2023-01-01 RX ADMIN — BUDESONIDE AND FORMOTEROL FUMARATE DIHYDRATE 2 PUFF(S): 160; 4.5 AEROSOL RESPIRATORY (INHALATION) at 13:36

## 2023-01-01 RX ADMIN — Medication 1 GRAM(S): at 17:59

## 2023-01-01 RX ADMIN — Medication 975 MILLIGRAM(S): at 19:20

## 2023-01-01 RX ADMIN — Medication 1 TABLET(S): at 06:06

## 2023-01-01 RX ADMIN — Medication 1 GRAM(S): at 12:19

## 2023-01-01 RX ADMIN — Medication 5 MILLIGRAM(S): at 05:46

## 2023-01-01 RX ADMIN — Medication 25 MILLIGRAM(S): at 21:26

## 2023-01-01 RX ADMIN — ATORVASTATIN CALCIUM 40 MILLIGRAM(S): 80 TABLET, FILM COATED ORAL at 21:17

## 2023-01-01 RX ADMIN — SERTRALINE 200 MILLIGRAM(S): 25 TABLET, FILM COATED ORAL at 13:23

## 2023-01-01 RX ADMIN — ATORVASTATIN CALCIUM 40 MILLIGRAM(S): 80 TABLET, FILM COATED ORAL at 21:25

## 2023-01-01 RX ADMIN — AMLODIPINE BESYLATE 10 MILLIGRAM(S): 2.5 TABLET ORAL at 05:56

## 2023-01-01 RX ADMIN — Medication 5 MILLIGRAM(S): at 14:07

## 2023-01-01 RX ADMIN — SEVELAMER CARBONATE 800 MILLIGRAM(S): 2400 POWDER, FOR SUSPENSION ORAL at 13:35

## 2023-01-01 RX ADMIN — Medication 25 MICROGRAM(S): at 05:56

## 2023-01-01 RX ADMIN — Medication 1 UNIT(S): at 08:18

## 2023-01-01 RX ADMIN — Medication 25 MILLIGRAM(S): at 05:52

## 2023-01-01 RX ADMIN — Medication 1 PATCH: at 12:25

## 2023-01-01 RX ADMIN — LATANOPROST 1 DROP(S): 0.05 SOLUTION/ DROPS OPHTHALMIC; TOPICAL at 22:26

## 2023-01-01 RX ADMIN — Medication 125 MILLIGRAM(S): at 12:39

## 2023-01-01 RX ADMIN — ERYTHROPOIETIN 10000 UNIT(S): 10000 INJECTION, SOLUTION INTRAVENOUS; SUBCUTANEOUS at 12:13

## 2023-01-01 RX ADMIN — LIDOCAINE 2 PATCH: 4 CREAM TOPICAL at 11:22

## 2023-01-01 RX ADMIN — Medication 650 MILLIGRAM(S): at 22:04

## 2023-01-01 RX ADMIN — Medication 10 MILLIGRAM(S): at 13:27

## 2023-01-01 RX ADMIN — Medication 25 MILLIGRAM(S): at 22:38

## 2023-01-01 RX ADMIN — ALBUTEROL 2 PUFF(S): 90 AEROSOL, METERED ORAL at 17:35

## 2023-01-01 RX ADMIN — Medication 40 MILLIGRAM(S): at 06:58

## 2023-01-01 RX ADMIN — Medication 25 MICROGRAM(S): at 06:13

## 2023-01-01 RX ADMIN — ERYTHROPOIETIN 20000 UNIT(S): 10000 INJECTION, SOLUTION INTRAVENOUS; SUBCUTANEOUS at 13:11

## 2023-01-01 RX ADMIN — Medication 80 MILLIGRAM(S): at 05:53

## 2023-01-01 RX ADMIN — SEVELAMER CARBONATE 800 MILLIGRAM(S): 2400 POWDER, FOR SUSPENSION ORAL at 13:27

## 2023-01-01 RX ADMIN — Medication 100 MILLIGRAM(S): at 09:00

## 2023-01-01 RX ADMIN — SERTRALINE 50 MILLIGRAM(S): 25 TABLET, FILM COATED ORAL at 17:55

## 2023-01-01 RX ADMIN — Medication 10 MILLIGRAM(S): at 23:55

## 2023-01-01 RX ADMIN — Medication 3 MILLILITER(S): at 10:42

## 2023-01-01 RX ADMIN — Medication 80 MILLIGRAM(S): at 13:16

## 2023-01-01 RX ADMIN — AMLODIPINE BESYLATE 10 MILLIGRAM(S): 2.5 TABLET ORAL at 06:13

## 2023-01-01 RX ADMIN — Medication 50 MILLIGRAM(S): at 06:58

## 2023-01-01 RX ADMIN — Medication 81 MILLIGRAM(S): at 11:54

## 2023-01-01 RX ADMIN — Medication 1: at 13:19

## 2023-01-01 RX ADMIN — BUDESONIDE AND FORMOTEROL FUMARATE DIHYDRATE 2 PUFF(S): 160; 4.5 AEROSOL RESPIRATORY (INHALATION) at 21:41

## 2023-01-01 RX ADMIN — AMLODIPINE BESYLATE 10 MILLIGRAM(S): 2.5 TABLET ORAL at 05:53

## 2023-01-01 RX ADMIN — BUDESONIDE AND FORMOTEROL FUMARATE DIHYDRATE 2 PUFF(S): 160; 4.5 AEROSOL RESPIRATORY (INHALATION) at 10:33

## 2023-01-01 RX ADMIN — Medication 40 MILLIGRAM(S): at 06:18

## 2023-01-01 RX ADMIN — ATORVASTATIN CALCIUM 40 MILLIGRAM(S): 80 TABLET, FILM COATED ORAL at 21:24

## 2023-01-01 RX ADMIN — SEVELAMER CARBONATE 800 MILLIGRAM(S): 2400 POWDER, FOR SUSPENSION ORAL at 12:05

## 2023-01-01 RX ADMIN — Medication 3 MILLILITER(S): at 03:25

## 2023-01-01 RX ADMIN — Medication 2: at 12:35

## 2023-01-01 RX ADMIN — Medication 10 MILLIGRAM(S): at 21:50

## 2023-01-01 RX ADMIN — Medication 1 GRAM(S): at 06:07

## 2023-01-01 RX ADMIN — Medication 2.5 MILLIGRAM(S): at 11:25

## 2023-01-01 RX ADMIN — ONDANSETRON 4 MILLIGRAM(S): 8 TABLET, FILM COATED ORAL at 06:25

## 2023-01-01 RX ADMIN — Medication 325 MILLIGRAM(S): at 12:36

## 2023-01-01 RX ADMIN — ERYTHROPOIETIN 10000 UNIT(S): 10000 INJECTION, SOLUTION INTRAVENOUS; SUBCUTANEOUS at 12:03

## 2023-01-01 RX ADMIN — PANTOPRAZOLE SODIUM 40 MILLIGRAM(S): 20 TABLET, DELAYED RELEASE ORAL at 19:41

## 2023-01-01 RX ADMIN — ZOLPIDEM TARTRATE 5 MILLIGRAM(S): 10 TABLET ORAL at 23:45

## 2023-01-01 RX ADMIN — Medication 1 TABLET(S): at 17:37

## 2023-01-01 RX ADMIN — Medication 3 MILLIGRAM(S): at 21:56

## 2023-01-01 RX ADMIN — Medication 25 MICROGRAM(S): at 05:44

## 2023-01-01 RX ADMIN — Medication 325 MILLIGRAM(S): at 13:18

## 2023-01-01 RX ADMIN — Medication 25 MILLIGRAM(S): at 22:33

## 2023-01-01 RX ADMIN — ALBUTEROL 2.5 MILLIGRAM(S): 90 AEROSOL, METERED ORAL at 04:24

## 2023-01-01 RX ADMIN — SEVELAMER CARBONATE 800 MILLIGRAM(S): 2400 POWDER, FOR SUSPENSION ORAL at 08:24

## 2023-01-01 RX ADMIN — Medication 1 GRAM(S): at 17:08

## 2023-01-01 RX ADMIN — Medication 125 MILLIGRAM(S): at 15:49

## 2023-01-01 RX ADMIN — Medication 60 MILLIGRAM(S): at 15:20

## 2023-01-01 RX ADMIN — Medication 3 MILLILITER(S): at 20:21

## 2023-01-01 RX ADMIN — Medication 3 MILLILITER(S): at 15:06

## 2023-01-01 RX ADMIN — Medication 1 TABLET(S): at 11:47

## 2023-01-01 RX ADMIN — Medication 125 MILLIGRAM(S): at 12:16

## 2023-01-01 RX ADMIN — CHLORHEXIDINE GLUCONATE 1 APPLICATION(S): 213 SOLUTION TOPICAL at 13:29

## 2023-01-01 RX ADMIN — MORPHINE SULFATE 2 MILLIGRAM(S): 50 CAPSULE, EXTENDED RELEASE ORAL at 17:41

## 2023-01-01 RX ADMIN — Medication 1 MILLIGRAM(S): at 11:46

## 2023-01-01 RX ADMIN — Medication 25 MILLIGRAM(S): at 21:07

## 2023-01-01 RX ADMIN — ALBUTEROL 2 PUFF(S): 90 AEROSOL, METERED ORAL at 22:17

## 2023-01-01 RX ADMIN — Medication 975 MILLIGRAM(S): at 21:45

## 2023-01-01 RX ADMIN — Medication 25 MILLIGRAM(S): at 22:29

## 2023-01-01 RX ADMIN — ALBUTEROL 2 PUFF(S): 90 AEROSOL, METERED ORAL at 03:29

## 2023-01-01 RX ADMIN — BUDESONIDE AND FORMOTEROL FUMARATE DIHYDRATE 2 PUFF(S): 160; 4.5 AEROSOL RESPIRATORY (INHALATION) at 12:56

## 2023-01-01 RX ADMIN — Medication 12.5 MILLIGRAM(S): at 09:41

## 2023-01-01 RX ADMIN — Medication 40 MILLIGRAM(S): at 05:21

## 2023-01-01 RX ADMIN — Medication 3 MILLILITER(S): at 11:32

## 2023-01-01 RX ADMIN — Medication 10 MILLIGRAM(S): at 17:17

## 2023-01-01 RX ADMIN — SEVELAMER CARBONATE 800 MILLIGRAM(S): 2400 POWDER, FOR SUSPENSION ORAL at 12:16

## 2023-01-01 RX ADMIN — AZITHROMYCIN 255 MILLIGRAM(S): 500 TABLET, FILM COATED ORAL at 17:34

## 2023-01-01 RX ADMIN — Medication 1 GRAM(S): at 18:03

## 2023-01-01 RX ADMIN — Medication 5 MILLIGRAM(S): at 22:16

## 2023-01-01 RX ADMIN — Medication 325 MILLIGRAM(S): at 12:19

## 2023-01-01 RX ADMIN — Medication 1 GRAM(S): at 23:42

## 2023-01-01 RX ADMIN — Medication 1 GRAM(S): at 05:53

## 2023-01-01 RX ADMIN — SERTRALINE 200 MILLIGRAM(S): 25 TABLET, FILM COATED ORAL at 12:32

## 2023-01-01 RX ADMIN — Medication 81 MILLIGRAM(S): at 13:29

## 2023-01-01 RX ADMIN — Medication 80 MILLIGRAM(S): at 13:31

## 2023-01-01 RX ADMIN — CEFEPIME 100 MILLIGRAM(S): 1 INJECTION, POWDER, FOR SOLUTION INTRAMUSCULAR; INTRAVENOUS at 18:21

## 2023-01-01 RX ADMIN — Medication 1 TABLET(S): at 13:28

## 2023-01-01 RX ADMIN — ZOLPIDEM TARTRATE 5 MILLIGRAM(S): 10 TABLET ORAL at 00:59

## 2023-01-01 RX ADMIN — SEVELAMER CARBONATE 800 MILLIGRAM(S): 2400 POWDER, FOR SUSPENSION ORAL at 17:44

## 2023-01-01 RX ADMIN — PANTOPRAZOLE SODIUM 40 MILLIGRAM(S): 20 TABLET, DELAYED RELEASE ORAL at 05:33

## 2023-01-01 RX ADMIN — Medication 25 MILLIGRAM(S): at 22:42

## 2023-01-01 RX ADMIN — ATORVASTATIN CALCIUM 80 MILLIGRAM(S): 80 TABLET, FILM COATED ORAL at 21:34

## 2023-01-01 RX ADMIN — ALBUTEROL 2 PUFF(S): 90 AEROSOL, METERED ORAL at 16:51

## 2023-01-01 RX ADMIN — CHLORHEXIDINE GLUCONATE 1 APPLICATION(S): 213 SOLUTION TOPICAL at 12:04

## 2023-01-01 RX ADMIN — Medication 0.25 MILLIGRAM(S): at 22:13

## 2023-01-01 RX ADMIN — Medication 1 GRAM(S): at 00:53

## 2023-01-01 RX ADMIN — Medication 25 MILLIGRAM(S): at 05:07

## 2023-01-01 RX ADMIN — Medication 1 TABLET(S): at 05:54

## 2023-01-01 RX ADMIN — Medication 125 MILLIGRAM(S): at 17:35

## 2023-01-01 RX ADMIN — Medication 1 GRAM(S): at 12:15

## 2023-01-01 RX ADMIN — ATORVASTATIN CALCIUM 40 MILLIGRAM(S): 80 TABLET, FILM COATED ORAL at 23:49

## 2023-01-01 RX ADMIN — Medication 3 MILLILITER(S): at 08:49

## 2023-01-01 RX ADMIN — Medication 650 MILLIGRAM(S): at 09:51

## 2023-01-01 RX ADMIN — SEVELAMER CARBONATE 800 MILLIGRAM(S): 2400 POWDER, FOR SUSPENSION ORAL at 12:39

## 2023-01-01 RX ADMIN — SEVELAMER CARBONATE 800 MILLIGRAM(S): 2400 POWDER, FOR SUSPENSION ORAL at 08:01

## 2023-01-01 RX ADMIN — Medication 1 GRAM(S): at 12:20

## 2023-01-01 RX ADMIN — Medication 5 MILLIGRAM(S): at 21:11

## 2023-01-01 RX ADMIN — Medication 25 MILLIGRAM(S): at 19:17

## 2023-01-01 RX ADMIN — BUDESONIDE AND FORMOTEROL FUMARATE DIHYDRATE 2 PUFF(S): 160; 4.5 AEROSOL RESPIRATORY (INHALATION) at 00:27

## 2023-01-01 RX ADMIN — Medication 3 MILLILITER(S): at 02:00

## 2023-01-01 RX ADMIN — SEVELAMER CARBONATE 800 MILLIGRAM(S): 2400 POWDER, FOR SUSPENSION ORAL at 12:28

## 2023-01-01 RX ADMIN — SEVELAMER CARBONATE 800 MILLIGRAM(S): 2400 POWDER, FOR SUSPENSION ORAL at 12:35

## 2023-01-01 RX ADMIN — Medication 0.1 MILLIGRAM(S): at 01:58

## 2023-01-01 RX ADMIN — Medication 3 MILLILITER(S): at 08:05

## 2023-01-01 RX ADMIN — Medication 3 MILLILITER(S): at 14:37

## 2023-01-01 RX ADMIN — SEVELAMER CARBONATE 800 MILLIGRAM(S): 2400 POWDER, FOR SUSPENSION ORAL at 17:10

## 2023-01-01 RX ADMIN — Medication 3 MILLIGRAM(S): at 22:57

## 2023-01-01 RX ADMIN — ATORVASTATIN CALCIUM 40 MILLIGRAM(S): 80 TABLET, FILM COATED ORAL at 22:25

## 2023-01-01 RX ADMIN — Medication 3 MILLILITER(S): at 03:13

## 2023-01-01 RX ADMIN — PANTOPRAZOLE SODIUM 40 MILLIGRAM(S): 20 TABLET, DELAYED RELEASE ORAL at 05:49

## 2023-01-01 RX ADMIN — Medication 1 GRAM(S): at 17:58

## 2023-01-01 RX ADMIN — Medication 25 MILLIGRAM(S): at 21:42

## 2023-01-01 RX ADMIN — BUDESONIDE AND FORMOTEROL FUMARATE DIHYDRATE 2 PUFF(S): 160; 4.5 AEROSOL RESPIRATORY (INHALATION) at 21:35

## 2023-01-01 RX ADMIN — Medication 100 MILLIGRAM(S): at 14:33

## 2023-01-01 RX ADMIN — ALBUTEROL 2 PUFF(S): 90 AEROSOL, METERED ORAL at 11:55

## 2023-01-01 RX ADMIN — Medication 1 GRAM(S): at 01:16

## 2023-01-01 RX ADMIN — Medication 650 MILLIGRAM(S): at 00:19

## 2023-01-01 RX ADMIN — Medication 1 GRAM(S): at 06:54

## 2023-01-01 RX ADMIN — SEVELAMER CARBONATE 800 MILLIGRAM(S): 2400 POWDER, FOR SUSPENSION ORAL at 12:51

## 2023-01-01 RX ADMIN — Medication 1 MILLIGRAM(S): at 12:48

## 2023-01-01 RX ADMIN — Medication 25 MILLIGRAM(S): at 05:31

## 2023-01-01 RX ADMIN — Medication 25 MILLIGRAM(S): at 21:17

## 2023-01-01 RX ADMIN — Medication 1 MILLIGRAM(S): at 12:15

## 2023-01-01 RX ADMIN — Medication 1 MILLIGRAM(S): at 14:33

## 2023-01-01 RX ADMIN — ATORVASTATIN CALCIUM 40 MILLIGRAM(S): 80 TABLET, FILM COATED ORAL at 22:24

## 2023-01-01 RX ADMIN — Medication 1 GRAM(S): at 09:41

## 2023-01-01 RX ADMIN — BUDESONIDE AND FORMOTEROL FUMARATE DIHYDRATE 2 PUFF(S): 160; 4.5 AEROSOL RESPIRATORY (INHALATION) at 22:42

## 2023-01-01 RX ADMIN — Medication 1 GRAM(S): at 07:07

## 2023-01-01 RX ADMIN — Medication 3 MILLIGRAM(S): at 23:36

## 2023-01-01 RX ADMIN — Medication 5 MILLIGRAM(S): at 21:12

## 2023-01-01 RX ADMIN — Medication 3 MILLILITER(S): at 21:00

## 2023-01-01 RX ADMIN — Medication 1 GRAM(S): at 05:06

## 2023-01-01 RX ADMIN — Medication 3: at 17:16

## 2023-01-01 RX ADMIN — Medication 25 GRAM(S): at 09:24

## 2023-01-01 RX ADMIN — Medication 1 GRAM(S): at 05:20

## 2023-01-01 RX ADMIN — Medication 50 MILLIGRAM(S): at 05:36

## 2023-01-01 RX ADMIN — SIMVASTATIN 40 MILLIGRAM(S): 20 TABLET, FILM COATED ORAL at 21:34

## 2023-01-01 RX ADMIN — Medication 25 MILLIGRAM(S): at 21:35

## 2023-01-01 RX ADMIN — Medication 25 MILLIGRAM(S): at 18:11

## 2023-01-01 RX ADMIN — SEVELAMER CARBONATE 800 MILLIGRAM(S): 2400 POWDER, FOR SUSPENSION ORAL at 14:33

## 2023-01-01 RX ADMIN — Medication 650 MILLIGRAM(S): at 15:38

## 2023-01-01 RX ADMIN — Medication 10 MILLIGRAM(S): at 21:34

## 2023-01-01 RX ADMIN — Medication 25 MILLIGRAM(S): at 22:16

## 2023-01-01 RX ADMIN — ZOLPIDEM TARTRATE 5 MILLIGRAM(S): 10 TABLET ORAL at 22:13

## 2023-01-01 RX ADMIN — Medication 3 MILLILITER(S): at 03:40

## 2023-01-01 RX ADMIN — PANTOPRAZOLE SODIUM 40 MILLIGRAM(S): 20 TABLET, DELAYED RELEASE ORAL at 06:58

## 2023-01-01 RX ADMIN — BUDESONIDE AND FORMOTEROL FUMARATE DIHYDRATE 2 PUFF(S): 160; 4.5 AEROSOL RESPIRATORY (INHALATION) at 22:30

## 2023-01-01 RX ADMIN — Medication 1 GRAM(S): at 12:55

## 2023-01-01 RX ADMIN — Medication 40 MILLIGRAM(S): at 05:28

## 2023-01-01 RX ADMIN — SIMVASTATIN 40 MILLIGRAM(S): 20 TABLET, FILM COATED ORAL at 21:40

## 2023-01-01 RX ADMIN — Medication 650 MILLIGRAM(S): at 02:29

## 2023-01-01 RX ADMIN — ATORVASTATIN CALCIUM 40 MILLIGRAM(S): 80 TABLET, FILM COATED ORAL at 21:40

## 2023-01-01 RX ADMIN — Medication 1 PATCH: at 20:44

## 2023-01-01 RX ADMIN — Medication 125 MILLIGRAM(S): at 13:50

## 2023-01-01 RX ADMIN — Medication 25 MILLIGRAM(S): at 22:06

## 2023-01-01 RX ADMIN — Medication 25 MICROGRAM(S): at 06:28

## 2023-01-01 RX ADMIN — CHLORHEXIDINE GLUCONATE 1 APPLICATION(S): 213 SOLUTION TOPICAL at 13:52

## 2023-01-01 RX ADMIN — CEFEPIME 100 MILLIGRAM(S): 1 INJECTION, POWDER, FOR SOLUTION INTRAMUSCULAR; INTRAVENOUS at 18:26

## 2023-01-01 RX ADMIN — ALBUTEROL 2 PUFF(S): 90 AEROSOL, METERED ORAL at 22:34

## 2023-01-01 RX ADMIN — Medication 1 GRAM(S): at 05:07

## 2023-01-01 RX ADMIN — Medication 3 MILLIGRAM(S): at 00:16

## 2023-01-01 RX ADMIN — Medication 10 MILLIGRAM(S): at 05:32

## 2023-01-01 RX ADMIN — Medication 0.5 MILLIGRAM(S): at 08:07

## 2023-01-01 RX ADMIN — Medication 3 MILLILITER(S): at 08:03

## 2023-01-01 RX ADMIN — Medication 5 MILLIGRAM(S): at 17:27

## 2023-01-01 RX ADMIN — Medication 650 MILLIGRAM(S): at 01:15

## 2023-01-01 RX ADMIN — Medication 2: at 08:40

## 2023-01-01 RX ADMIN — Medication 1 GRAM(S): at 13:35

## 2023-01-01 RX ADMIN — HALOPERIDOL DECANOATE 0.5 MILLIGRAM(S): 100 INJECTION INTRAMUSCULAR at 20:27

## 2023-01-01 RX ADMIN — ALBUTEROL 2 PUFF(S): 90 AEROSOL, METERED ORAL at 12:39

## 2023-01-01 RX ADMIN — Medication 25 MILLIGRAM(S): at 05:20

## 2023-01-01 RX ADMIN — Medication 1000 MILLIGRAM(S): at 06:07

## 2023-01-01 RX ADMIN — SEVELAMER CARBONATE 800 MILLIGRAM(S): 2400 POWDER, FOR SUSPENSION ORAL at 12:30

## 2023-01-01 RX ADMIN — Medication 3 MILLILITER(S): at 09:40

## 2023-01-01 RX ADMIN — Medication 80 MILLIGRAM(S): at 05:57

## 2023-01-01 RX ADMIN — AMLODIPINE BESYLATE 10 MILLIGRAM(S): 2.5 TABLET ORAL at 22:39

## 2023-01-01 RX ADMIN — BUDESONIDE AND FORMOTEROL FUMARATE DIHYDRATE 2 PUFF(S): 160; 4.5 AEROSOL RESPIRATORY (INHALATION) at 11:48

## 2023-01-01 RX ADMIN — Medication 25 MICROGRAM(S): at 04:24

## 2023-01-01 RX ADMIN — SERTRALINE 200 MILLIGRAM(S): 25 TABLET, FILM COATED ORAL at 13:41

## 2023-01-01 RX ADMIN — LIDOCAINE 2 PATCH: 4 CREAM TOPICAL at 00:14

## 2023-01-01 RX ADMIN — ALBUTEROL 2 PUFF(S): 90 AEROSOL, METERED ORAL at 21:22

## 2023-01-01 RX ADMIN — ZOLPIDEM TARTRATE 5 MILLIGRAM(S): 10 TABLET ORAL at 23:58

## 2023-01-01 RX ADMIN — SERTRALINE 200 MILLIGRAM(S): 25 TABLET, FILM COATED ORAL at 12:51

## 2023-01-01 RX ADMIN — Medication 1 GRAM(S): at 04:25

## 2023-01-01 RX ADMIN — SODIUM ZIRCONIUM CYCLOSILICATE 10 GRAM(S): 10 POWDER, FOR SUSPENSION ORAL at 08:25

## 2023-01-01 RX ADMIN — Medication 0.25 MILLIGRAM(S): at 21:38

## 2023-01-01 RX ADMIN — Medication 1 GRAM(S): at 17:28

## 2023-01-01 RX ADMIN — Medication 80 MILLIGRAM(S): at 06:09

## 2023-01-01 RX ADMIN — ATORVASTATIN CALCIUM 40 MILLIGRAM(S): 80 TABLET, FILM COATED ORAL at 22:57

## 2023-01-01 RX ADMIN — Medication 5 MILLIGRAM(S): at 06:57

## 2023-01-01 RX ADMIN — CHLORHEXIDINE GLUCONATE 1 APPLICATION(S): 213 SOLUTION TOPICAL at 12:05

## 2023-01-01 RX ADMIN — MUPIROCIN 1 APPLICATION(S): 20 OINTMENT TOPICAL at 05:53

## 2023-01-01 RX ADMIN — Medication 125 MILLIGRAM(S): at 06:11

## 2023-01-01 RX ADMIN — PANTOPRAZOLE SODIUM 40 MILLIGRAM(S): 20 TABLET, DELAYED RELEASE ORAL at 17:20

## 2023-01-01 RX ADMIN — Medication 1 GRAM(S): at 12:28

## 2023-01-01 RX ADMIN — Medication 3 MILLILITER(S): at 15:57

## 2023-01-01 RX ADMIN — LIDOCAINE 2 PATCH: 4 CREAM TOPICAL at 23:15

## 2023-01-01 RX ADMIN — Medication 1: at 08:09

## 2023-01-01 RX ADMIN — BUDESONIDE AND FORMOTEROL FUMARATE DIHYDRATE 2 PUFF(S): 160; 4.5 AEROSOL RESPIRATORY (INHALATION) at 22:24

## 2023-01-01 RX ADMIN — Medication 25 MILLIGRAM(S): at 05:33

## 2023-01-01 RX ADMIN — LIDOCAINE 1 PATCH: 4 CREAM TOPICAL at 12:24

## 2023-01-01 RX ADMIN — Medication 3 MILLILITER(S): at 15:01

## 2023-01-01 RX ADMIN — Medication 1 GRAM(S): at 13:16

## 2023-01-01 RX ADMIN — Medication 325 MILLIGRAM(S): at 13:36

## 2023-01-01 RX ADMIN — BUDESONIDE AND FORMOTEROL FUMARATE DIHYDRATE 2 PUFF(S): 160; 4.5 AEROSOL RESPIRATORY (INHALATION) at 09:25

## 2023-01-01 RX ADMIN — Medication 12.5 MILLIGRAM(S): at 05:49

## 2023-01-01 RX ADMIN — ERYTHROPOIETIN 20000 UNIT(S): 10000 INJECTION, SOLUTION INTRAVENOUS; SUBCUTANEOUS at 11:26

## 2023-01-01 RX ADMIN — AMLODIPINE BESYLATE 10 MILLIGRAM(S): 2.5 TABLET ORAL at 05:21

## 2023-01-01 RX ADMIN — ATORVASTATIN CALCIUM 40 MILLIGRAM(S): 80 TABLET, FILM COATED ORAL at 22:17

## 2023-01-01 RX ADMIN — Medication 5 MILLIGRAM(S): at 21:56

## 2023-01-01 RX ADMIN — Medication 1 GRAM(S): at 11:20

## 2023-01-01 RX ADMIN — Medication 1 GRAM(S): at 06:00

## 2023-01-01 RX ADMIN — Medication 125 MILLIGRAM(S): at 23:31

## 2023-01-01 RX ADMIN — Medication 125 MILLIGRAM(S): at 18:20

## 2023-01-01 RX ADMIN — Medication 650 MILLIGRAM(S): at 03:45

## 2023-01-01 RX ADMIN — Medication 50 MILLIGRAM(S): at 06:01

## 2023-01-01 RX ADMIN — Medication 2 MILLIGRAM(S): at 00:40

## 2023-01-01 RX ADMIN — Medication 1 TABLET(S): at 12:29

## 2023-01-01 RX ADMIN — Medication 3 MILLIGRAM(S): at 22:35

## 2023-01-01 RX ADMIN — ONDANSETRON 4 MILLIGRAM(S): 8 TABLET, FILM COATED ORAL at 12:57

## 2023-01-01 RX ADMIN — Medication 3 MILLILITER(S): at 15:50

## 2023-01-01 RX ADMIN — BUDESONIDE AND FORMOTEROL FUMARATE DIHYDRATE 2 PUFF(S): 160; 4.5 AEROSOL RESPIRATORY (INHALATION) at 21:29

## 2023-01-01 RX ADMIN — Medication 1 GRAM(S): at 02:07

## 2023-01-01 RX ADMIN — Medication 25 MILLIGRAM(S): at 13:41

## 2023-01-01 RX ADMIN — Medication 5 MILLIGRAM(S): at 06:55

## 2023-01-01 RX ADMIN — PANTOPRAZOLE SODIUM 40 MILLIGRAM(S): 20 TABLET, DELAYED RELEASE ORAL at 06:39

## 2023-01-01 RX ADMIN — Medication 3 MILLIGRAM(S): at 21:18

## 2023-01-01 RX ADMIN — Medication 80 MILLIGRAM(S): at 05:56

## 2023-01-01 RX ADMIN — Medication 10 MILLIGRAM(S): at 22:37

## 2023-01-01 RX ADMIN — Medication 81 MILLIGRAM(S): at 12:20

## 2023-01-01 RX ADMIN — Medication 1 TABLET(S): at 17:10

## 2023-01-01 RX ADMIN — Medication 25 MICROGRAM(S): at 06:04

## 2023-01-01 RX ADMIN — Medication 81 MILLIGRAM(S): at 12:48

## 2023-01-01 RX ADMIN — CHLORHEXIDINE GLUCONATE 1 APPLICATION(S): 213 SOLUTION TOPICAL at 12:17

## 2023-01-01 RX ADMIN — Medication 3 MILLILITER(S): at 14:16

## 2023-01-01 RX ADMIN — Medication 3 MILLILITER(S): at 02:06

## 2023-01-01 RX ADMIN — Medication 8: at 17:27

## 2023-01-01 RX ADMIN — Medication 3 MILLILITER(S): at 08:32

## 2023-01-01 RX ADMIN — Medication 1 TABLET(S): at 17:24

## 2023-01-01 RX ADMIN — Medication 1 GRAM(S): at 05:47

## 2023-01-01 RX ADMIN — Medication 1: at 08:21

## 2023-01-01 RX ADMIN — BUDESONIDE AND FORMOTEROL FUMARATE DIHYDRATE 2 PUFF(S): 160; 4.5 AEROSOL RESPIRATORY (INHALATION) at 10:34

## 2023-01-01 RX ADMIN — LATANOPROST 1 DROP(S): 0.05 SOLUTION/ DROPS OPHTHALMIC; TOPICAL at 22:04

## 2023-01-01 RX ADMIN — Medication 650 MILLIGRAM(S): at 20:59

## 2023-01-01 RX ADMIN — Medication 25 MICROGRAM(S): at 06:02

## 2023-01-01 RX ADMIN — ZOLPIDEM TARTRATE 5 MILLIGRAM(S): 10 TABLET ORAL at 06:35

## 2023-01-01 RX ADMIN — Medication 1 TABLET(S): at 12:26

## 2023-01-01 RX ADMIN — Medication 1 TABLET(S): at 17:45

## 2023-01-01 RX ADMIN — Medication 1 GRAM(S): at 05:27

## 2023-01-01 RX ADMIN — ATORVASTATIN CALCIUM 40 MILLIGRAM(S): 80 TABLET, FILM COATED ORAL at 22:52

## 2023-01-01 RX ADMIN — ZOLPIDEM TARTRATE 5 MILLIGRAM(S): 10 TABLET ORAL at 21:23

## 2023-01-01 RX ADMIN — Medication 1 GRAM(S): at 17:48

## 2023-01-01 RX ADMIN — Medication 3: at 18:08

## 2023-01-01 RX ADMIN — Medication 2 MILLIGRAM(S): at 09:18

## 2023-01-01 RX ADMIN — PANTOPRAZOLE SODIUM 40 MILLIGRAM(S): 20 TABLET, DELAYED RELEASE ORAL at 05:15

## 2023-01-01 RX ADMIN — Medication 25 MILLIGRAM(S): at 23:55

## 2023-01-01 RX ADMIN — SIMVASTATIN 40 MILLIGRAM(S): 20 TABLET, FILM COATED ORAL at 21:43

## 2023-01-01 RX ADMIN — Medication 3 MILLILITER(S): at 14:33

## 2023-01-01 RX ADMIN — SEVELAMER CARBONATE 800 MILLIGRAM(S): 2400 POWDER, FOR SUSPENSION ORAL at 17:28

## 2023-01-01 RX ADMIN — Medication 3 MILLIGRAM(S): at 21:38

## 2023-01-01 RX ADMIN — Medication 25 MILLIGRAM(S): at 22:35

## 2023-01-01 RX ADMIN — Medication 1 GRAM(S): at 18:21

## 2023-01-01 RX ADMIN — Medication 5 MILLIGRAM(S): at 22:39

## 2023-01-01 RX ADMIN — AMLODIPINE BESYLATE 10 MILLIGRAM(S): 2.5 TABLET ORAL at 04:23

## 2023-01-01 RX ADMIN — OXYCODONE HYDROCHLORIDE 5 MILLIGRAM(S): 5 TABLET ORAL at 21:43

## 2023-01-01 RX ADMIN — Medication 1 GRAM(S): at 07:16

## 2023-01-01 RX ADMIN — Medication 1 GRAM(S): at 13:17

## 2023-01-01 RX ADMIN — Medication 975 MILLIGRAM(S): at 22:49

## 2023-01-01 RX ADMIN — ERYTHROPOIETIN 10000 UNIT(S): 10000 INJECTION, SOLUTION INTRAVENOUS; SUBCUTANEOUS at 18:19

## 2023-01-01 RX ADMIN — Medication 1000 MILLIGRAM(S): at 16:35

## 2023-01-01 RX ADMIN — Medication 1 MILLIGRAM(S): at 12:18

## 2023-01-01 RX ADMIN — ALBUTEROL 2 PUFF(S): 90 AEROSOL, METERED ORAL at 22:54

## 2023-01-01 RX ADMIN — PANTOPRAZOLE SODIUM 40 MILLIGRAM(S): 20 TABLET, DELAYED RELEASE ORAL at 05:44

## 2023-01-01 RX ADMIN — ATORVASTATIN CALCIUM 40 MILLIGRAM(S): 80 TABLET, FILM COATED ORAL at 22:33

## 2023-01-01 RX ADMIN — LATANOPROST 1 DROP(S): 0.05 SOLUTION/ DROPS OPHTHALMIC; TOPICAL at 22:29

## 2023-01-01 RX ADMIN — LIDOCAINE 1 PATCH: 4 CREAM TOPICAL at 23:29

## 2023-01-01 RX ADMIN — Medication 2: at 12:03

## 2023-01-01 RX ADMIN — HEPARIN SODIUM 5000 UNIT(S): 5000 INJECTION INTRAVENOUS; SUBCUTANEOUS at 05:22

## 2023-01-01 RX ADMIN — Medication 25 GRAM(S): at 08:18

## 2023-01-01 RX ADMIN — Medication 25 MILLIGRAM(S): at 13:48

## 2023-01-01 RX ADMIN — Medication 1 GRAM(S): at 13:22

## 2023-01-01 RX ADMIN — ALBUTEROL 2 PUFF(S): 90 AEROSOL, METERED ORAL at 02:48

## 2023-01-01 RX ADMIN — SODIUM ZIRCONIUM CYCLOSILICATE 10 GRAM(S): 10 POWDER, FOR SUSPENSION ORAL at 08:41

## 2023-01-01 RX ADMIN — Medication 1 GRAM(S): at 05:57

## 2023-01-01 RX ADMIN — ONDANSETRON 4 MILLIGRAM(S): 8 TABLET, FILM COATED ORAL at 22:34

## 2023-01-01 RX ADMIN — AMLODIPINE BESYLATE 10 MILLIGRAM(S): 2.5 TABLET ORAL at 05:12

## 2023-01-01 RX ADMIN — BUDESONIDE AND FORMOTEROL FUMARATE DIHYDRATE 2 PUFF(S): 160; 4.5 AEROSOL RESPIRATORY (INHALATION) at 21:23

## 2023-01-01 RX ADMIN — Medication 50 MILLIGRAM(S): at 13:27

## 2023-01-01 RX ADMIN — Medication 3: at 08:23

## 2023-01-01 RX ADMIN — Medication 25 MICROGRAM(S): at 05:58

## 2023-01-01 RX ADMIN — Medication 80 MILLIGRAM(S): at 05:21

## 2023-01-01 RX ADMIN — Medication 1 PATCH: at 20:26

## 2023-01-01 RX ADMIN — CHLORHEXIDINE GLUCONATE 1 APPLICATION(S): 213 SOLUTION TOPICAL at 06:47

## 2023-01-01 RX ADMIN — ATORVASTATIN CALCIUM 40 MILLIGRAM(S): 80 TABLET, FILM COATED ORAL at 21:26

## 2023-01-01 RX ADMIN — Medication 1 GRAM(S): at 18:17

## 2023-01-01 RX ADMIN — Medication 25 MICROGRAM(S): at 08:30

## 2023-01-01 RX ADMIN — Medication 1 TABLET(S): at 06:20

## 2023-01-01 RX ADMIN — ALBUTEROL 2 PUFF(S): 90 AEROSOL, METERED ORAL at 01:55

## 2023-01-01 RX ADMIN — Medication 25 MILLIGRAM(S): at 21:34

## 2023-01-01 RX ADMIN — Medication 25 MICROGRAM(S): at 06:25

## 2023-01-01 RX ADMIN — Medication 25 MICROGRAM(S): at 06:54

## 2023-01-01 RX ADMIN — Medication 3 MILLILITER(S): at 08:13

## 2023-01-01 RX ADMIN — Medication 1 GRAM(S): at 00:04

## 2023-01-01 RX ADMIN — Medication 25 MILLIGRAM(S): at 13:31

## 2023-01-01 RX ADMIN — Medication 1 GRAM(S): at 23:47

## 2023-01-01 RX ADMIN — Medication 25 MILLILITER(S): at 09:27

## 2023-01-01 RX ADMIN — ALBUTEROL 2 PUFF(S): 90 AEROSOL, METERED ORAL at 08:39

## 2023-01-01 RX ADMIN — BUDESONIDE AND FORMOTEROL FUMARATE DIHYDRATE 2 PUFF(S): 160; 4.5 AEROSOL RESPIRATORY (INHALATION) at 13:29

## 2023-01-01 RX ADMIN — Medication 10 MILLIGRAM(S): at 05:07

## 2023-01-01 RX ADMIN — Medication 25 MILLIGRAM(S): at 21:14

## 2023-01-01 RX ADMIN — Medication 3 MILLILITER(S): at 20:34

## 2023-01-01 RX ADMIN — Medication 1 GRAM(S): at 18:19

## 2023-01-01 RX ADMIN — TIOTROPIUM BROMIDE 2 PUFF(S): 18 CAPSULE ORAL; RESPIRATORY (INHALATION) at 11:39

## 2023-01-01 RX ADMIN — CHLORHEXIDINE GLUCONATE 1 APPLICATION(S): 213 SOLUTION TOPICAL at 05:54

## 2023-01-01 RX ADMIN — Medication 2: at 17:42

## 2023-01-01 RX ADMIN — ATORVASTATIN CALCIUM 40 MILLIGRAM(S): 80 TABLET, FILM COATED ORAL at 22:26

## 2023-01-01 RX ADMIN — Medication 5 MILLIGRAM(S): at 06:05

## 2023-01-01 RX ADMIN — Medication 25 MICROGRAM(S): at 05:06

## 2023-01-01 RX ADMIN — Medication 1 UNIT(S): at 11:59

## 2023-01-01 RX ADMIN — Medication 1 MILLIGRAM(S): at 13:28

## 2023-01-01 RX ADMIN — Medication 3: at 12:26

## 2023-01-01 RX ADMIN — ERYTHROPOIETIN 10000 UNIT(S): 10000 INJECTION, SOLUTION INTRAVENOUS; SUBCUTANEOUS at 12:47

## 2023-01-01 RX ADMIN — Medication 500 MILLIGRAM(S): at 09:24

## 2023-01-01 RX ADMIN — BUDESONIDE AND FORMOTEROL FUMARATE DIHYDRATE 2 PUFF(S): 160; 4.5 AEROSOL RESPIRATORY (INHALATION) at 10:24

## 2023-01-01 RX ADMIN — Medication 80 MILLIGRAM(S): at 07:01

## 2023-01-01 RX ADMIN — SERTRALINE 200 MILLIGRAM(S): 25 TABLET, FILM COATED ORAL at 13:09

## 2023-01-01 RX ADMIN — Medication 25 MILLIGRAM(S): at 13:52

## 2023-01-01 RX ADMIN — PANTOPRAZOLE SODIUM 40 MILLIGRAM(S): 20 TABLET, DELAYED RELEASE ORAL at 05:31

## 2023-01-01 RX ADMIN — Medication 325 MILLIGRAM(S): at 11:47

## 2023-01-01 RX ADMIN — Medication 1 GRAM(S): at 12:17

## 2023-01-01 RX ADMIN — Medication 3 MILLILITER(S): at 11:29

## 2023-01-01 RX ADMIN — ONDANSETRON 4 MILLIGRAM(S): 8 TABLET, FILM COATED ORAL at 14:53

## 2023-01-01 RX ADMIN — Medication 3 MILLILITER(S): at 08:15

## 2023-01-01 RX ADMIN — Medication 25 MILLIGRAM(S): at 14:05

## 2023-01-01 RX ADMIN — Medication 5 MILLIGRAM(S): at 21:19

## 2023-01-01 RX ADMIN — AMLODIPINE BESYLATE 10 MILLIGRAM(S): 2.5 TABLET ORAL at 05:58

## 2023-01-01 RX ADMIN — Medication 10 MILLIGRAM(S): at 13:18

## 2023-01-01 RX ADMIN — Medication 10 MILLIGRAM(S): at 06:26

## 2023-01-01 RX ADMIN — Medication 25 MILLIGRAM(S): at 03:04

## 2023-01-01 RX ADMIN — OXYCODONE HYDROCHLORIDE 5 MILLIGRAM(S): 5 TABLET ORAL at 20:35

## 2023-01-01 RX ADMIN — BUDESONIDE AND FORMOTEROL FUMARATE DIHYDRATE 2 PUFF(S): 160; 4.5 AEROSOL RESPIRATORY (INHALATION) at 21:33

## 2023-01-01 RX ADMIN — CEFTRIAXONE 100 MILLIGRAM(S): 500 INJECTION, POWDER, FOR SOLUTION INTRAMUSCULAR; INTRAVENOUS at 21:32

## 2023-01-01 RX ADMIN — Medication 3 MILLILITER(S): at 07:40

## 2023-01-01 RX ADMIN — Medication 25 MILLIGRAM(S): at 06:13

## 2023-01-01 RX ADMIN — Medication 25 MILLIGRAM(S): at 05:27

## 2023-01-01 RX ADMIN — Medication 50 MILLIGRAM(S): at 06:14

## 2023-01-01 RX ADMIN — Medication 40 MILLIGRAM(S): at 05:47

## 2023-01-01 RX ADMIN — SEVELAMER CARBONATE 800 MILLIGRAM(S): 2400 POWDER, FOR SUSPENSION ORAL at 18:06

## 2023-01-01 RX ADMIN — AMLODIPINE BESYLATE 10 MILLIGRAM(S): 2.5 TABLET ORAL at 06:27

## 2023-01-01 RX ADMIN — PANTOPRAZOLE SODIUM 40 MILLIGRAM(S): 20 TABLET, DELAYED RELEASE ORAL at 05:28

## 2023-01-01 RX ADMIN — Medication 3 MILLILITER(S): at 02:40

## 2023-01-01 RX ADMIN — ALBUTEROL 2.5 MILLIGRAM(S): 90 AEROSOL, METERED ORAL at 22:52

## 2023-01-01 RX ADMIN — PREGABALIN 1000 MICROGRAM(S): 225 CAPSULE ORAL at 11:27

## 2023-01-01 RX ADMIN — ERYTHROPOIETIN 20000 UNIT(S): 10000 INJECTION, SOLUTION INTRAVENOUS; SUBCUTANEOUS at 16:03

## 2023-01-01 RX ADMIN — Medication 1 TABLET(S): at 17:59

## 2023-01-01 RX ADMIN — AMLODIPINE BESYLATE 10 MILLIGRAM(S): 2.5 TABLET ORAL at 05:30

## 2023-01-01 RX ADMIN — SERTRALINE 200 MILLIGRAM(S): 25 TABLET, FILM COATED ORAL at 13:54

## 2023-01-01 RX ADMIN — Medication 0.5 MILLIGRAM(S): at 08:05

## 2023-01-01 RX ADMIN — Medication 10 MILLIGRAM(S): at 16:42

## 2023-01-01 RX ADMIN — Medication 650 MILLIGRAM(S): at 01:20

## 2023-01-01 RX ADMIN — Medication 25 MILLIGRAM(S): at 21:50

## 2023-01-01 RX ADMIN — Medication 1: at 17:32

## 2023-01-01 RX ADMIN — Medication 325 MILLIGRAM(S): at 12:14

## 2023-01-01 RX ADMIN — HALOPERIDOL DECANOATE 0.5 MILLIGRAM(S): 100 INJECTION INTRAMUSCULAR at 11:14

## 2023-01-01 RX ADMIN — ATORVASTATIN CALCIUM 40 MILLIGRAM(S): 80 TABLET, FILM COATED ORAL at 21:43

## 2023-01-01 RX ADMIN — Medication 1 TABLET(S): at 17:44

## 2023-01-01 RX ADMIN — AMLODIPINE BESYLATE 10 MILLIGRAM(S): 2.5 TABLET ORAL at 05:06

## 2023-01-01 RX ADMIN — ALBUTEROL 2 PUFF(S): 90 AEROSOL, METERED ORAL at 08:37

## 2023-01-01 RX ADMIN — SEVELAMER CARBONATE 800 MILLIGRAM(S): 2400 POWDER, FOR SUSPENSION ORAL at 13:09

## 2023-01-01 RX ADMIN — Medication 25 MILLIGRAM(S): at 06:40

## 2023-01-01 RX ADMIN — Medication 50 MILLIGRAM(S): at 05:43

## 2023-01-01 RX ADMIN — ALBUTEROL 2 PUFF(S): 90 AEROSOL, METERED ORAL at 21:26

## 2023-01-01 RX ADMIN — CHLORHEXIDINE GLUCONATE 1 APPLICATION(S): 213 SOLUTION TOPICAL at 12:52

## 2023-01-01 RX ADMIN — Medication 25 MICROGRAM(S): at 06:38

## 2023-01-01 RX ADMIN — Medication 81 MILLIGRAM(S): at 11:19

## 2023-01-01 RX ADMIN — Medication 5 MILLIGRAM(S): at 12:08

## 2023-01-01 RX ADMIN — Medication 40 MILLIGRAM(S): at 17:41

## 2023-01-01 RX ADMIN — SENNA PLUS 2 TABLET(S): 8.6 TABLET ORAL at 21:23

## 2023-01-01 RX ADMIN — Medication 12.5 MILLIGRAM(S): at 06:18

## 2023-01-01 RX ADMIN — BUDESONIDE AND FORMOTEROL FUMARATE DIHYDRATE 2 PUFF(S): 160; 4.5 AEROSOL RESPIRATORY (INHALATION) at 21:28

## 2023-01-01 RX ADMIN — Medication 1 GRAM(S): at 05:11

## 2023-01-01 RX ADMIN — LIDOCAINE 2 PATCH: 4 CREAM TOPICAL at 00:12

## 2023-01-01 RX ADMIN — ALBUTEROL 2 PUFF(S): 90 AEROSOL, METERED ORAL at 21:16

## 2023-01-01 RX ADMIN — Medication 650 MILLIGRAM(S): at 01:50

## 2023-01-01 RX ADMIN — BUDESONIDE AND FORMOTEROL FUMARATE DIHYDRATE 2 PUFF(S): 160; 4.5 AEROSOL RESPIRATORY (INHALATION) at 10:35

## 2023-01-01 RX ADMIN — AMLODIPINE BESYLATE 10 MILLIGRAM(S): 2.5 TABLET ORAL at 06:37

## 2023-01-01 RX ADMIN — BUDESONIDE AND FORMOTEROL FUMARATE DIHYDRATE 2 PUFF(S): 160; 4.5 AEROSOL RESPIRATORY (INHALATION) at 22:02

## 2023-01-01 RX ADMIN — AMLODIPINE BESYLATE 10 MILLIGRAM(S): 2.5 TABLET ORAL at 05:48

## 2023-01-01 RX ADMIN — ALBUTEROL 2 PUFF(S): 90 AEROSOL, METERED ORAL at 21:27

## 2023-01-01 RX ADMIN — BUDESONIDE AND FORMOTEROL FUMARATE DIHYDRATE 2 PUFF(S): 160; 4.5 AEROSOL RESPIRATORY (INHALATION) at 21:25

## 2023-01-01 RX ADMIN — Medication 1 GRAM(S): at 00:36

## 2023-01-01 RX ADMIN — SEVELAMER CARBONATE 800 MILLIGRAM(S): 2400 POWDER, FOR SUSPENSION ORAL at 08:37

## 2023-01-01 RX ADMIN — Medication 1 TABLET(S): at 05:49

## 2023-01-01 RX ADMIN — BUDESONIDE AND FORMOTEROL FUMARATE DIHYDRATE 2 PUFF(S): 160; 4.5 AEROSOL RESPIRATORY (INHALATION) at 10:53

## 2023-01-01 RX ADMIN — Medication 325 MILLIGRAM(S): at 11:26

## 2023-01-01 RX ADMIN — Medication 3 MILLILITER(S): at 08:07

## 2023-01-01 RX ADMIN — Medication 50 MILLILITER(S): at 22:43

## 2023-01-01 RX ADMIN — SODIUM ZIRCONIUM CYCLOSILICATE 10 GRAM(S): 10 POWDER, FOR SUSPENSION ORAL at 08:52

## 2023-01-01 RX ADMIN — Medication 1 GRAM(S): at 06:25

## 2023-01-01 RX ADMIN — Medication 50 MILLIGRAM(S): at 17:13

## 2023-01-01 RX ADMIN — Medication 3 MILLILITER(S): at 05:43

## 2023-01-01 RX ADMIN — SODIUM ZIRCONIUM CYCLOSILICATE 10 GRAM(S): 10 POWDER, FOR SUSPENSION ORAL at 05:07

## 2023-01-01 RX ADMIN — Medication 3 MILLILITER(S): at 09:00

## 2023-01-01 RX ADMIN — SEVELAMER CARBONATE 800 MILLIGRAM(S): 2400 POWDER, FOR SUSPENSION ORAL at 05:11

## 2023-01-01 RX ADMIN — ATORVASTATIN CALCIUM 40 MILLIGRAM(S): 80 TABLET, FILM COATED ORAL at 21:42

## 2023-01-01 RX ADMIN — Medication 650 MILLIGRAM(S): at 23:45

## 2023-01-01 RX ADMIN — Medication 25 MILLIGRAM(S): at 06:04

## 2023-01-01 RX ADMIN — SERTRALINE 200 MILLIGRAM(S): 25 TABLET, FILM COATED ORAL at 13:51

## 2023-01-01 RX ADMIN — Medication 650 MILLIGRAM(S): at 10:46

## 2023-01-01 RX ADMIN — Medication 1 GRAM(S): at 12:40

## 2023-01-01 RX ADMIN — SEVELAMER CARBONATE 800 MILLIGRAM(S): 2400 POWDER, FOR SUSPENSION ORAL at 14:23

## 2023-01-01 RX ADMIN — CHLORHEXIDINE GLUCONATE 1 APPLICATION(S): 213 SOLUTION TOPICAL at 12:30

## 2023-01-01 RX ADMIN — Medication 50 MILLIGRAM(S): at 22:39

## 2023-01-01 RX ADMIN — Medication 1 GRAM(S): at 13:37

## 2023-01-01 RX ADMIN — LIDOCAINE 2 PATCH: 4 CREAM TOPICAL at 19:00

## 2023-01-01 RX ADMIN — Medication 1 GRAM(S): at 05:56

## 2023-01-01 RX ADMIN — Medication 3 MILLILITER(S): at 02:04

## 2023-01-01 RX ADMIN — Medication 3 MILLILITER(S): at 08:04

## 2023-01-01 RX ADMIN — ONDANSETRON 4 MILLIGRAM(S): 8 TABLET, FILM COATED ORAL at 10:51

## 2023-01-01 RX ADMIN — Medication 1 GRAM(S): at 06:38

## 2023-01-01 RX ADMIN — Medication 1000 UNIT(S): at 18:05

## 2023-01-01 RX ADMIN — ALBUTEROL 2 PUFF(S): 90 AEROSOL, METERED ORAL at 21:00

## 2023-01-01 RX ADMIN — Medication 3 MILLILITER(S): at 02:46

## 2023-01-01 RX ADMIN — PANTOPRAZOLE SODIUM 40 MILLIGRAM(S): 20 TABLET, DELAYED RELEASE ORAL at 06:18

## 2023-01-01 RX ADMIN — BUDESONIDE AND FORMOTEROL FUMARATE DIHYDRATE 2 PUFF(S): 160; 4.5 AEROSOL RESPIRATORY (INHALATION) at 18:42

## 2023-01-01 RX ADMIN — BUDESONIDE AND FORMOTEROL FUMARATE DIHYDRATE 2 PUFF(S): 160; 4.5 AEROSOL RESPIRATORY (INHALATION) at 21:06

## 2023-01-01 RX ADMIN — BUDESONIDE AND FORMOTEROL FUMARATE DIHYDRATE 2 PUFF(S): 160; 4.5 AEROSOL RESPIRATORY (INHALATION) at 11:13

## 2023-01-01 RX ADMIN — SEVELAMER CARBONATE 800 MILLIGRAM(S): 2400 POWDER, FOR SUSPENSION ORAL at 13:22

## 2023-01-01 RX ADMIN — ATORVASTATIN CALCIUM 40 MILLIGRAM(S): 80 TABLET, FILM COATED ORAL at 22:44

## 2023-01-01 RX ADMIN — ZOLPIDEM TARTRATE 5 MILLIGRAM(S): 10 TABLET ORAL at 22:30

## 2023-01-01 RX ADMIN — SEVELAMER CARBONATE 800 MILLIGRAM(S): 2400 POWDER, FOR SUSPENSION ORAL at 12:26

## 2023-01-01 RX ADMIN — Medication 5 MILLIGRAM(S): at 22:53

## 2023-01-01 RX ADMIN — Medication 1 GRAM(S): at 13:41

## 2023-01-01 RX ADMIN — Medication 125 MILLIGRAM(S): at 13:02

## 2023-01-01 RX ADMIN — Medication 650 MILLIGRAM(S): at 04:10

## 2023-01-01 RX ADMIN — LIDOCAINE 1 PATCH: 4 CREAM TOPICAL at 00:40

## 2023-01-01 RX ADMIN — OXYCODONE HYDROCHLORIDE 5 MILLIGRAM(S): 5 TABLET ORAL at 03:41

## 2023-01-01 RX ADMIN — POLYETHYLENE GLYCOL 3350 17 GRAM(S): 17 POWDER, FOR SOLUTION ORAL at 11:43

## 2023-01-01 RX ADMIN — Medication 80 MILLIGRAM(S): at 05:59

## 2023-01-01 RX ADMIN — Medication 81 MILLIGRAM(S): at 12:28

## 2023-01-01 RX ADMIN — Medication 25 MILLIGRAM(S): at 23:23

## 2023-01-01 RX ADMIN — PANTOPRAZOLE SODIUM 40 MILLIGRAM(S): 20 TABLET, DELAYED RELEASE ORAL at 05:47

## 2023-01-01 RX ADMIN — Medication 1.5 MILLIGRAM(S): at 18:10

## 2023-01-01 RX ADMIN — Medication 50 MILLIGRAM(S): at 21:16

## 2023-01-01 RX ADMIN — Medication 1 GRAM(S): at 05:23

## 2023-01-01 RX ADMIN — TIOTROPIUM BROMIDE 2 PUFF(S): 18 CAPSULE ORAL; RESPIRATORY (INHALATION) at 11:37

## 2023-01-01 RX ADMIN — Medication 10 MILLIGRAM(S): at 05:12

## 2023-01-01 RX ADMIN — ALBUTEROL 2 PUFF(S): 90 AEROSOL, METERED ORAL at 12:20

## 2023-01-01 RX ADMIN — Medication 25 MILLIGRAM(S): at 22:46

## 2023-01-01 RX ADMIN — LATANOPROST 1 DROP(S): 0.05 SOLUTION/ DROPS OPHTHALMIC; TOPICAL at 22:57

## 2023-01-01 RX ADMIN — Medication 40 MILLIGRAM(S): at 06:05

## 2023-01-01 RX ADMIN — AMLODIPINE BESYLATE 10 MILLIGRAM(S): 2.5 TABLET ORAL at 05:55

## 2023-01-01 RX ADMIN — Medication 3 MILLILITER(S): at 02:05

## 2023-01-01 RX ADMIN — SODIUM CHLORIDE 100 MILLILITER(S): 9 INJECTION, SOLUTION INTRAVENOUS at 21:44

## 2023-01-01 RX ADMIN — ALBUTEROL 2.5 MILLIGRAM(S): 90 AEROSOL, METERED ORAL at 21:21

## 2023-01-01 RX ADMIN — SEVELAMER CARBONATE 800 MILLIGRAM(S): 2400 POWDER, FOR SUSPENSION ORAL at 08:59

## 2023-01-01 RX ADMIN — LIDOCAINE 2 PATCH: 4 CREAM TOPICAL at 17:43

## 2023-01-01 RX ADMIN — ERYTHROPOIETIN 20000 UNIT(S): 10000 INJECTION, SOLUTION INTRAVENOUS; SUBCUTANEOUS at 12:11

## 2023-01-01 RX ADMIN — Medication 1 GRAM(S): at 17:40

## 2023-01-01 RX ADMIN — Medication 1 MILLIGRAM(S): at 11:19

## 2023-01-01 RX ADMIN — Medication 5 MILLIGRAM(S): at 05:21

## 2023-01-01 RX ADMIN — ALBUTEROL 2 PUFF(S): 90 AEROSOL, METERED ORAL at 03:30

## 2023-01-01 RX ADMIN — Medication 81 MILLIGRAM(S): at 12:04

## 2023-01-01 RX ADMIN — OXYCODONE HYDROCHLORIDE 5 MILLIGRAM(S): 5 TABLET ORAL at 22:33

## 2023-01-01 RX ADMIN — BUDESONIDE AND FORMOTEROL FUMARATE DIHYDRATE 2 PUFF(S): 160; 4.5 AEROSOL RESPIRATORY (INHALATION) at 22:09

## 2023-01-01 RX ADMIN — Medication 81 MILLIGRAM(S): at 11:48

## 2023-01-01 RX ADMIN — Medication 3 MILLILITER(S): at 20:05

## 2023-01-01 RX ADMIN — Medication 1 TABLET(S): at 05:55

## 2023-01-01 RX ADMIN — SEVELAMER CARBONATE 800 MILLIGRAM(S): 2400 POWDER, FOR SUSPENSION ORAL at 17:21

## 2023-01-01 RX ADMIN — Medication 0.25 MILLIGRAM(S): at 21:40

## 2023-01-01 RX ADMIN — Medication 25 MICROGRAM(S): at 05:31

## 2023-01-01 RX ADMIN — Medication 100 MILLIGRAM(S): at 05:57

## 2023-01-01 RX ADMIN — Medication 4 UNIT(S): at 17:45

## 2023-01-01 RX ADMIN — ALBUTEROL 2 PUFF(S): 90 AEROSOL, METERED ORAL at 20:53

## 2023-01-01 RX ADMIN — PANTOPRAZOLE SODIUM 40 MILLIGRAM(S): 20 TABLET, DELAYED RELEASE ORAL at 17:34

## 2023-01-01 RX ADMIN — Medication 4: at 08:29

## 2023-01-01 RX ADMIN — SERTRALINE 200 MILLIGRAM(S): 25 TABLET, FILM COATED ORAL at 17:42

## 2023-01-01 RX ADMIN — Medication 975 MILLIGRAM(S): at 02:01

## 2023-01-01 RX ADMIN — INSULIN GLARGINE 4 UNIT(S): 100 INJECTION, SOLUTION SUBCUTANEOUS at 22:26

## 2023-01-01 RX ADMIN — PREGABALIN 1000 MICROGRAM(S): 225 CAPSULE ORAL at 21:35

## 2023-01-01 RX ADMIN — Medication 3 MILLILITER(S): at 20:51

## 2023-01-01 RX ADMIN — SODIUM ZIRCONIUM CYCLOSILICATE 10 GRAM(S): 10 POWDER, FOR SUSPENSION ORAL at 16:48

## 2023-01-01 RX ADMIN — Medication 125 MILLIGRAM(S): at 06:02

## 2023-01-01 RX ADMIN — Medication 1 GRAM(S): at 12:24

## 2023-01-01 RX ADMIN — Medication 25 MILLIGRAM(S): at 06:38

## 2023-01-01 RX ADMIN — Medication 1 GRAM(S): at 23:23

## 2023-01-01 RX ADMIN — Medication 1 GRAM(S): at 12:52

## 2023-01-01 RX ADMIN — Medication 1 GRAM(S): at 06:20

## 2023-01-01 RX ADMIN — Medication 25 MILLIGRAM(S): at 05:59

## 2023-01-01 RX ADMIN — Medication 50 MILLIGRAM(S): at 06:28

## 2023-01-01 RX ADMIN — ATORVASTATIN CALCIUM 40 MILLIGRAM(S): 80 TABLET, FILM COATED ORAL at 22:42

## 2023-01-01 RX ADMIN — Medication 25 MICROGRAM(S): at 05:12

## 2023-01-01 RX ADMIN — BUDESONIDE AND FORMOTEROL FUMARATE DIHYDRATE 2 PUFF(S): 160; 4.5 AEROSOL RESPIRATORY (INHALATION) at 10:54

## 2023-01-01 RX ADMIN — Medication 1 GRAM(S): at 17:13

## 2023-01-01 RX ADMIN — AMLODIPINE BESYLATE 10 MILLIGRAM(S): 2.5 TABLET ORAL at 20:22

## 2023-01-01 RX ADMIN — Medication 1 GRAM(S): at 17:36

## 2023-01-01 RX ADMIN — Medication 1 GRAM(S): at 06:02

## 2023-01-01 RX ADMIN — Medication 1 MILLIGRAM(S): at 13:51

## 2023-01-01 RX ADMIN — Medication 25 MICROGRAM(S): at 06:17

## 2023-01-01 RX ADMIN — Medication 25 MICROGRAM(S): at 07:01

## 2023-01-01 RX ADMIN — OXYCODONE HYDROCHLORIDE 5 MILLIGRAM(S): 5 TABLET ORAL at 23:06

## 2023-01-01 RX ADMIN — Medication 25 MICROGRAM(S): at 05:48

## 2023-01-01 RX ADMIN — AZITHROMYCIN 255 MILLIGRAM(S): 500 TABLET, FILM COATED ORAL at 21:32

## 2023-01-01 RX ADMIN — CHLORHEXIDINE GLUCONATE 1 APPLICATION(S): 213 SOLUTION TOPICAL at 12:35

## 2023-01-01 RX ADMIN — ALBUTEROL 2 PUFF(S): 90 AEROSOL, METERED ORAL at 14:13

## 2023-01-01 RX ADMIN — Medication 3 MILLILITER(S): at 16:03

## 2023-01-01 RX ADMIN — INSULIN GLARGINE 4 UNIT(S): 100 INJECTION, SOLUTION SUBCUTANEOUS at 21:41

## 2023-01-01 RX ADMIN — HALOPERIDOL DECANOATE 2 MILLIGRAM(S): 100 INJECTION INTRAMUSCULAR at 13:48

## 2023-01-01 RX ADMIN — BUDESONIDE AND FORMOTEROL FUMARATE DIHYDRATE 2 PUFF(S): 160; 4.5 AEROSOL RESPIRATORY (INHALATION) at 12:03

## 2023-01-01 RX ADMIN — Medication 325 MILLIGRAM(S): at 16:02

## 2023-01-01 RX ADMIN — BUDESONIDE AND FORMOTEROL FUMARATE DIHYDRATE 2 PUFF(S): 160; 4.5 AEROSOL RESPIRATORY (INHALATION) at 22:57

## 2023-01-01 RX ADMIN — SEVELAMER CARBONATE 800 MILLIGRAM(S): 2400 POWDER, FOR SUSPENSION ORAL at 21:43

## 2023-01-01 RX ADMIN — Medication 1 GRAM(S): at 13:54

## 2023-01-01 RX ADMIN — BUDESONIDE AND FORMOTEROL FUMARATE DIHYDRATE 2 PUFF(S): 160; 4.5 AEROSOL RESPIRATORY (INHALATION) at 21:17

## 2023-01-01 RX ADMIN — Medication 1 GRAM(S): at 22:09

## 2023-01-01 RX ADMIN — Medication 1 GRAM(S): at 23:58

## 2023-01-01 RX ADMIN — Medication 3 MILLILITER(S): at 20:01

## 2023-01-01 RX ADMIN — LIDOCAINE 2 PATCH: 4 CREAM TOPICAL at 11:39

## 2023-01-01 RX ADMIN — Medication 5 MILLIGRAM(S): at 06:06

## 2023-01-01 RX ADMIN — Medication 2: at 16:46

## 2023-01-01 RX ADMIN — Medication 1 GRAM(S): at 23:49

## 2023-01-01 RX ADMIN — Medication 125 MILLIGRAM(S): at 23:10

## 2023-01-01 RX ADMIN — Medication 3 MILLILITER(S): at 20:28

## 2023-01-01 RX ADMIN — SEVELAMER CARBONATE 800 MILLIGRAM(S): 2400 POWDER, FOR SUSPENSION ORAL at 12:19

## 2023-01-01 RX ADMIN — Medication 975 MILLIGRAM(S): at 03:18

## 2023-01-01 RX ADMIN — PANTOPRAZOLE SODIUM 40 MILLIGRAM(S): 20 TABLET, DELAYED RELEASE ORAL at 05:57

## 2023-01-01 RX ADMIN — Medication 25 MICROGRAM(S): at 06:48

## 2023-01-01 RX ADMIN — Medication 1 GRAM(S): at 12:22

## 2023-01-01 RX ADMIN — ALBUTEROL 2 PUFF(S): 90 AEROSOL, METERED ORAL at 21:29

## 2023-01-01 RX ADMIN — Medication 0.5 MILLIGRAM(S): at 06:39

## 2023-01-01 RX ADMIN — Medication 100 MILLIGRAM(S): at 14:46

## 2023-01-01 RX ADMIN — CHLORHEXIDINE GLUCONATE 1 APPLICATION(S): 213 SOLUTION TOPICAL at 14:11

## 2023-01-01 RX ADMIN — Medication 3 MILLILITER(S): at 14:47

## 2023-01-01 RX ADMIN — Medication 3 MILLILITER(S): at 03:11

## 2023-01-01 RX ADMIN — CHLORHEXIDINE GLUCONATE 1 APPLICATION(S): 213 SOLUTION TOPICAL at 12:18

## 2023-01-01 RX ADMIN — Medication 5 MILLIGRAM(S): at 14:06

## 2023-01-01 RX ADMIN — Medication 1 MILLIGRAM(S): at 12:17

## 2023-01-01 RX ADMIN — ATORVASTATIN CALCIUM 40 MILLIGRAM(S): 80 TABLET, FILM COATED ORAL at 22:48

## 2023-01-01 RX ADMIN — Medication 4: at 08:24

## 2023-01-01 RX ADMIN — Medication 1: at 22:11

## 2023-01-01 RX ADMIN — AMLODIPINE BESYLATE 10 MILLIGRAM(S): 2.5 TABLET ORAL at 05:43

## 2023-01-01 RX ADMIN — Medication 3 MILLILITER(S): at 09:17

## 2023-01-01 RX ADMIN — Medication 5 MILLIGRAM(S): at 18:25

## 2023-01-01 RX ADMIN — Medication 1 MILLIGRAM(S): at 12:30

## 2023-01-01 RX ADMIN — Medication 1 MILLIGRAM(S): at 11:48

## 2023-01-01 RX ADMIN — Medication 125 MILLIGRAM(S): at 05:50

## 2023-01-01 RX ADMIN — Medication 25 MILLIGRAM(S): at 21:15

## 2023-01-01 RX ADMIN — Medication 25 MILLIGRAM(S): at 21:23

## 2023-01-01 RX ADMIN — Medication 1 MILLIGRAM(S): at 13:27

## 2023-01-01 RX ADMIN — Medication 50 MILLIGRAM(S): at 06:09

## 2023-01-01 RX ADMIN — Medication 3 MILLILITER(S): at 08:10

## 2023-01-01 RX ADMIN — Medication 25 MICROGRAM(S): at 05:15

## 2023-01-01 RX ADMIN — Medication 100 MILLIGRAM(S): at 13:32

## 2023-01-01 RX ADMIN — Medication 25 MILLIGRAM(S): at 14:54

## 2023-01-01 RX ADMIN — Medication 1000 MILLIGRAM(S): at 20:40

## 2023-01-01 RX ADMIN — PANTOPRAZOLE SODIUM 40 MILLIGRAM(S): 20 TABLET, DELAYED RELEASE ORAL at 06:54

## 2023-01-01 RX ADMIN — Medication 10 MILLIGRAM(S): at 23:23

## 2023-01-01 RX ADMIN — Medication 1 GRAM(S): at 06:11

## 2023-01-01 RX ADMIN — Medication 3 MILLILITER(S): at 20:13

## 2023-01-01 RX ADMIN — ERYTHROPOIETIN 10000 UNIT(S): 10000 INJECTION, SOLUTION INTRAVENOUS; SUBCUTANEOUS at 11:47

## 2023-01-01 RX ADMIN — Medication 125 MILLIGRAM(S): at 06:39

## 2023-01-01 RX ADMIN — Medication 1 GRAM(S): at 13:29

## 2023-01-01 RX ADMIN — Medication 1 GRAM(S): at 17:38

## 2023-01-01 RX ADMIN — Medication 25 MILLIGRAM(S): at 15:20

## 2023-01-01 RX ADMIN — SEVELAMER CARBONATE 800 MILLIGRAM(S): 2400 POWDER, FOR SUSPENSION ORAL at 08:05

## 2023-01-01 RX ADMIN — SEVELAMER CARBONATE 800 MILLIGRAM(S): 2400 POWDER, FOR SUSPENSION ORAL at 09:20

## 2023-01-01 RX ADMIN — Medication 25 MILLIGRAM(S): at 06:27

## 2023-01-01 RX ADMIN — Medication 25 MICROGRAM(S): at 05:20

## 2023-01-01 RX ADMIN — SEVELAMER CARBONATE 800 MILLIGRAM(S): 2400 POWDER, FOR SUSPENSION ORAL at 13:18

## 2023-01-01 RX ADMIN — Medication 25 MILLIGRAM(S): at 08:30

## 2023-01-01 RX ADMIN — ATORVASTATIN CALCIUM 40 MILLIGRAM(S): 80 TABLET, FILM COATED ORAL at 22:21

## 2023-01-01 RX ADMIN — SEVELAMER CARBONATE 800 MILLIGRAM(S): 2400 POWDER, FOR SUSPENSION ORAL at 18:21

## 2023-01-01 RX ADMIN — ATORVASTATIN CALCIUM 40 MILLIGRAM(S): 80 TABLET, FILM COATED ORAL at 22:46

## 2023-01-01 RX ADMIN — Medication 100 MILLIGRAM(S): at 06:48

## 2023-01-01 RX ADMIN — Medication 5 MILLIGRAM(S): at 14:24

## 2023-01-01 RX ADMIN — SIMVASTATIN 40 MILLIGRAM(S): 20 TABLET, FILM COATED ORAL at 22:26

## 2023-01-01 RX ADMIN — PREGABALIN 1000 MICROGRAM(S): 225 CAPSULE ORAL at 12:20

## 2023-01-01 RX ADMIN — Medication 2 MILLIGRAM(S): at 22:18

## 2023-01-01 RX ADMIN — Medication 2: at 12:19

## 2023-01-01 RX ADMIN — BUDESONIDE AND FORMOTEROL FUMARATE DIHYDRATE 2 PUFF(S): 160; 4.5 AEROSOL RESPIRATORY (INHALATION) at 21:55

## 2023-01-01 RX ADMIN — Medication 3 MILLILITER(S): at 18:24

## 2023-01-01 RX ADMIN — Medication 50 MILLIGRAM(S): at 05:58

## 2023-01-01 RX ADMIN — Medication 1 MILLIGRAM(S): at 17:55

## 2023-01-01 RX ADMIN — ALBUTEROL 2 PUFF(S): 90 AEROSOL, METERED ORAL at 05:45

## 2023-01-01 RX ADMIN — INSULIN GLARGINE 4 UNIT(S): 100 INJECTION, SOLUTION SUBCUTANEOUS at 21:32

## 2023-01-01 RX ADMIN — Medication 1 TABLET(S): at 17:23

## 2023-01-01 RX ADMIN — Medication 1: at 22:16

## 2023-01-01 RX ADMIN — Medication 5 MILLIGRAM(S): at 06:54

## 2023-01-01 RX ADMIN — AMLODIPINE BESYLATE 10 MILLIGRAM(S): 2.5 TABLET ORAL at 06:04

## 2023-01-01 RX ADMIN — Medication 125 MILLIGRAM(S): at 14:41

## 2023-01-01 RX ADMIN — Medication 1 GRAM(S): at 17:16

## 2023-01-01 RX ADMIN — Medication 125 MILLIGRAM(S): at 17:32

## 2023-01-01 RX ADMIN — Medication 3 MILLIGRAM(S): at 22:25

## 2023-01-01 RX ADMIN — PREGABALIN 1000 MICROGRAM(S): 225 CAPSULE ORAL at 11:39

## 2023-01-01 RX ADMIN — Medication 2: at 08:05

## 2023-01-01 RX ADMIN — Medication 1 PATCH: at 13:33

## 2023-01-01 RX ADMIN — BUDESONIDE AND FORMOTEROL FUMARATE DIHYDRATE 2 PUFF(S): 160; 4.5 AEROSOL RESPIRATORY (INHALATION) at 13:20

## 2023-01-01 RX ADMIN — Medication 650 MILLIGRAM(S): at 02:47

## 2023-01-01 RX ADMIN — Medication 0.5 MILLIGRAM(S): at 00:22

## 2023-01-01 RX ADMIN — PANTOPRAZOLE SODIUM 40 MILLIGRAM(S): 20 TABLET, DELAYED RELEASE ORAL at 18:33

## 2023-01-01 RX ADMIN — Medication 100 MILLIGRAM(S): at 14:07

## 2023-01-01 RX ADMIN — Medication 100 MILLIGRAM(S): at 06:27

## 2023-01-01 RX ADMIN — ATORVASTATIN CALCIUM 40 MILLIGRAM(S): 80 TABLET, FILM COATED ORAL at 22:08

## 2023-01-01 RX ADMIN — ALBUTEROL 2 PUFF(S): 90 AEROSOL, METERED ORAL at 17:44

## 2023-01-01 RX ADMIN — BUDESONIDE AND FORMOTEROL FUMARATE DIHYDRATE 2 PUFF(S): 160; 4.5 AEROSOL RESPIRATORY (INHALATION) at 11:59

## 2023-01-01 RX ADMIN — Medication 1 MILLIGRAM(S): at 11:24

## 2023-01-01 RX ADMIN — Medication 2: at 08:26

## 2023-01-01 RX ADMIN — TIOTROPIUM BROMIDE 2 PUFF(S): 18 CAPSULE ORAL; RESPIRATORY (INHALATION) at 13:27

## 2023-01-01 RX ADMIN — OXYCODONE HYDROCHLORIDE 5 MILLIGRAM(S): 5 TABLET ORAL at 04:15

## 2023-01-01 RX ADMIN — PANTOPRAZOLE SODIUM 40 MILLIGRAM(S): 20 TABLET, DELAYED RELEASE ORAL at 17:18

## 2023-01-01 RX ADMIN — Medication 3 MILLIGRAM(S): at 01:25

## 2023-01-01 RX ADMIN — LATANOPROST 1 DROP(S): 0.05 SOLUTION/ DROPS OPHTHALMIC; TOPICAL at 22:35

## 2023-01-01 RX ADMIN — BUDESONIDE AND FORMOTEROL FUMARATE DIHYDRATE 2 PUFF(S): 160; 4.5 AEROSOL RESPIRATORY (INHALATION) at 05:45

## 2023-01-01 RX ADMIN — Medication 25 MICROGRAM(S): at 06:40

## 2023-01-01 RX ADMIN — PROPOFOL 5.51 MICROGRAM(S)/KG/MIN: 10 INJECTION, EMULSION INTRAVENOUS at 15:27

## 2023-01-01 RX ADMIN — CHLORHEXIDINE GLUCONATE 1 APPLICATION(S): 213 SOLUTION TOPICAL at 14:46

## 2023-01-01 RX ADMIN — Medication 1 GRAM(S): at 22:12

## 2023-01-01 RX ADMIN — Medication 3 MILLILITER(S): at 02:19

## 2023-01-01 RX ADMIN — Medication 25 MILLIGRAM(S): at 05:44

## 2023-01-01 RX ADMIN — INSULIN HUMAN 5 UNIT(S): 100 INJECTION, SOLUTION SUBCUTANEOUS at 22:43

## 2023-01-01 RX ADMIN — SERTRALINE 200 MILLIGRAM(S): 25 TABLET, FILM COATED ORAL at 12:18

## 2023-01-01 RX ADMIN — ALBUTEROL 2 PUFF(S): 90 AEROSOL, METERED ORAL at 09:24

## 2023-01-01 RX ADMIN — Medication 1 TABLET(S): at 18:26

## 2023-01-01 RX ADMIN — SERTRALINE 200 MILLIGRAM(S): 25 TABLET, FILM COATED ORAL at 12:16

## 2023-01-01 RX ADMIN — ALBUTEROL 2 PUFF(S): 90 AEROSOL, METERED ORAL at 21:56

## 2023-01-01 RX ADMIN — AMLODIPINE BESYLATE 10 MILLIGRAM(S): 2.5 TABLET ORAL at 05:15

## 2023-01-01 RX ADMIN — LIDOCAINE 2 PATCH: 4 CREAM TOPICAL at 12:49

## 2023-01-01 RX ADMIN — PANTOPRAZOLE SODIUM 40 MILLIGRAM(S): 20 TABLET, DELAYED RELEASE ORAL at 05:55

## 2023-01-01 RX ADMIN — Medication 100 MILLIGRAM(S): at 21:23

## 2023-01-01 RX ADMIN — Medication 80 MILLIGRAM(S): at 22:38

## 2023-01-01 RX ADMIN — ZOLPIDEM TARTRATE 5 MILLIGRAM(S): 10 TABLET ORAL at 21:32

## 2023-01-01 RX ADMIN — ALBUTEROL 2 PUFF(S): 90 AEROSOL, METERED ORAL at 19:04

## 2023-01-01 RX ADMIN — Medication 1 GRAM(S): at 06:08

## 2023-01-01 RX ADMIN — SIMVASTATIN 40 MILLIGRAM(S): 20 TABLET, FILM COATED ORAL at 21:23

## 2023-01-01 RX ADMIN — Medication 3 MILLILITER(S): at 19:23

## 2023-01-01 RX ADMIN — Medication 1 TABLET(S): at 18:05

## 2023-01-01 RX ADMIN — Medication 3 MILLILITER(S): at 18:44

## 2023-01-01 RX ADMIN — PANTOPRAZOLE SODIUM 40 MILLIGRAM(S): 20 TABLET, DELAYED RELEASE ORAL at 06:19

## 2023-01-01 RX ADMIN — Medication 1.5 MILLIGRAM(S): at 15:59

## 2023-01-01 RX ADMIN — SODIUM CHLORIDE 100 MILLILITER(S): 9 INJECTION, SOLUTION INTRAVENOUS at 15:01

## 2023-01-01 RX ADMIN — Medication 1 GRAM(S): at 17:21

## 2023-01-01 RX ADMIN — SEVELAMER CARBONATE 800 MILLIGRAM(S): 2400 POWDER, FOR SUSPENSION ORAL at 08:52

## 2023-01-01 RX ADMIN — SIMVASTATIN 40 MILLIGRAM(S): 20 TABLET, FILM COATED ORAL at 22:09

## 2023-01-01 RX ADMIN — Medication 1000 MILLIGRAM(S): at 19:28

## 2023-01-01 RX ADMIN — Medication 1: at 12:15

## 2023-01-01 RX ADMIN — Medication 1 TABLET(S): at 18:23

## 2023-01-01 RX ADMIN — Medication 5 MILLIGRAM(S): at 21:07

## 2023-01-01 RX ADMIN — Medication 25 MICROGRAM(S): at 06:56

## 2023-01-01 RX ADMIN — OXYCODONE HYDROCHLORIDE 5 MILLIGRAM(S): 5 TABLET ORAL at 01:00

## 2023-01-01 RX ADMIN — Medication 975 MILLIGRAM(S): at 02:30

## 2023-01-01 RX ADMIN — Medication 125 MILLIGRAM(S): at 00:07

## 2023-01-01 RX ADMIN — Medication 1: at 14:58

## 2023-01-01 RX ADMIN — Medication 10 MILLIGRAM(S): at 21:43

## 2023-01-01 RX ADMIN — Medication 3 MILLILITER(S): at 02:56

## 2023-01-01 RX ADMIN — Medication 80 MILLIGRAM(S): at 05:35

## 2023-01-01 RX ADMIN — Medication 1 MILLIGRAM(S): at 13:43

## 2023-01-01 RX ADMIN — Medication 25 GRAM(S): at 10:34

## 2023-01-01 RX ADMIN — SERTRALINE 200 MILLIGRAM(S): 25 TABLET, FILM COATED ORAL at 11:37

## 2023-01-01 RX ADMIN — CHLORHEXIDINE GLUCONATE 1 APPLICATION(S): 213 SOLUTION TOPICAL at 17:43

## 2023-01-01 RX ADMIN — Medication 1 GRAM(S): at 23:44

## 2023-01-01 RX ADMIN — Medication 3 MILLILITER(S): at 15:27

## 2023-01-01 RX ADMIN — Medication 25 MILLIGRAM(S): at 14:14

## 2023-01-01 RX ADMIN — Medication 25 MILLIGRAM(S): at 20:22

## 2023-01-01 RX ADMIN — ATORVASTATIN CALCIUM 40 MILLIGRAM(S): 80 TABLET, FILM COATED ORAL at 21:22

## 2023-01-01 RX ADMIN — LIDOCAINE 1 PATCH: 4 CREAM TOPICAL at 01:00

## 2023-01-01 RX ADMIN — PANTOPRAZOLE SODIUM 40 MILLIGRAM(S): 20 TABLET, DELAYED RELEASE ORAL at 05:54

## 2023-01-01 RX ADMIN — Medication 1 MILLIGRAM(S): at 14:46

## 2023-01-01 RX ADMIN — Medication 25 MILLIGRAM(S): at 21:41

## 2023-01-01 RX ADMIN — Medication 3 MILLIGRAM(S): at 22:24

## 2023-01-01 RX ADMIN — SERTRALINE 200 MILLIGRAM(S): 25 TABLET, FILM COATED ORAL at 17:36

## 2023-01-01 RX ADMIN — Medication 80 MILLIGRAM(S): at 05:55

## 2023-01-01 RX ADMIN — ERYTHROPOIETIN 10000 UNIT(S): 10000 INJECTION, SOLUTION INTRAVENOUS; SUBCUTANEOUS at 15:24

## 2023-01-01 RX ADMIN — ATORVASTATIN CALCIUM 40 MILLIGRAM(S): 80 TABLET, FILM COATED ORAL at 21:07

## 2023-01-01 RX ADMIN — Medication 1 TABLET(S): at 05:30

## 2023-01-01 RX ADMIN — ONDANSETRON 4 MILLIGRAM(S): 8 TABLET, FILM COATED ORAL at 03:11

## 2023-01-01 RX ADMIN — Medication 5 MILLIGRAM(S): at 18:13

## 2023-01-01 RX ADMIN — AMLODIPINE BESYLATE 10 MILLIGRAM(S): 2.5 TABLET ORAL at 07:16

## 2023-01-01 RX ADMIN — BUDESONIDE AND FORMOTEROL FUMARATE DIHYDRATE 2 PUFF(S): 160; 4.5 AEROSOL RESPIRATORY (INHALATION) at 12:20

## 2023-01-01 RX ADMIN — Medication 25 MILLIGRAM(S): at 06:18

## 2023-01-01 RX ADMIN — Medication 125 MILLIGRAM(S): at 17:37

## 2023-01-01 RX ADMIN — Medication 1 GRAM(S): at 17:43

## 2023-01-01 RX ADMIN — OXYCODONE HYDROCHLORIDE 5 MILLIGRAM(S): 5 TABLET ORAL at 20:01

## 2023-01-01 RX ADMIN — BUDESONIDE AND FORMOTEROL FUMARATE DIHYDRATE 2 PUFF(S): 160; 4.5 AEROSOL RESPIRATORY (INHALATION) at 10:31

## 2023-01-01 RX ADMIN — Medication 80 MILLIGRAM(S): at 05:06

## 2023-01-01 RX ADMIN — Medication 1 GRAM(S): at 17:14

## 2023-01-01 RX ADMIN — Medication 3 MILLILITER(S): at 23:37

## 2023-01-01 RX ADMIN — MUPIROCIN 1 APPLICATION(S): 20 OINTMENT TOPICAL at 18:19

## 2023-01-01 RX ADMIN — Medication 50 MILLIGRAM(S): at 05:28

## 2023-01-01 RX ADMIN — SEVELAMER CARBONATE 800 MILLIGRAM(S): 2400 POWDER, FOR SUSPENSION ORAL at 17:36

## 2023-01-01 RX ADMIN — ZOLPIDEM TARTRATE 5 MILLIGRAM(S): 10 TABLET ORAL at 21:41

## 2023-01-01 RX ADMIN — Medication 81 MILLIGRAM(S): at 14:41

## 2023-01-01 RX ADMIN — ATORVASTATIN CALCIUM 40 MILLIGRAM(S): 80 TABLET, FILM COATED ORAL at 22:47

## 2023-01-01 RX ADMIN — Medication 0.25 MILLIGRAM(S): at 22:16

## 2023-01-01 RX ADMIN — SERTRALINE 200 MILLIGRAM(S): 25 TABLET, FILM COATED ORAL at 12:17

## 2023-01-01 RX ADMIN — Medication 650 MILLIGRAM(S): at 02:56

## 2023-01-01 RX ADMIN — Medication 2 MILLIGRAM(S): at 15:37

## 2023-01-01 RX ADMIN — SERTRALINE 50 MILLIGRAM(S): 25 TABLET, FILM COATED ORAL at 14:33

## 2023-01-01 RX ADMIN — HEPARIN SODIUM 5000 UNIT(S): 5000 INJECTION INTRAVENOUS; SUBCUTANEOUS at 13:16

## 2023-01-01 RX ADMIN — Medication 10 MILLIGRAM(S): at 22:06

## 2023-01-01 RX ADMIN — LATANOPROST 1 DROP(S): 0.05 SOLUTION/ DROPS OPHTHALMIC; TOPICAL at 21:52

## 2023-01-01 RX ADMIN — Medication 100 MILLIGRAM(S): at 18:21

## 2023-01-01 RX ADMIN — BUDESONIDE AND FORMOTEROL FUMARATE DIHYDRATE 2 PUFF(S): 160; 4.5 AEROSOL RESPIRATORY (INHALATION) at 22:46

## 2023-01-01 RX ADMIN — Medication 100 MILLIGRAM(S): at 06:54

## 2023-01-01 RX ADMIN — Medication 1 MILLIGRAM(S): at 11:37

## 2023-01-01 RX ADMIN — PREGABALIN 1000 MICROGRAM(S): 225 CAPSULE ORAL at 11:38

## 2023-01-01 RX ADMIN — SERTRALINE 200 MILLIGRAM(S): 25 TABLET, FILM COATED ORAL at 12:02

## 2023-01-01 RX ADMIN — BUDESONIDE AND FORMOTEROL FUMARATE DIHYDRATE 2 PUFF(S): 160; 4.5 AEROSOL RESPIRATORY (INHALATION) at 09:31

## 2023-01-01 RX ADMIN — Medication 1 GRAM(S): at 05:49

## 2023-01-01 RX ADMIN — Medication 10 MILLIGRAM(S): at 21:40

## 2023-01-01 RX ADMIN — Medication 1 GRAM(S): at 18:59

## 2023-01-01 RX ADMIN — Medication 650 MILLIGRAM(S): at 03:09

## 2023-01-01 RX ADMIN — CEFEPIME 100 MILLIGRAM(S): 1 INJECTION, POWDER, FOR SOLUTION INTRAMUSCULAR; INTRAVENOUS at 18:34

## 2023-01-01 RX ADMIN — Medication 1 TABLET(S): at 08:13

## 2023-01-01 RX ADMIN — SEVELAMER CARBONATE 800 MILLIGRAM(S): 2400 POWDER, FOR SUSPENSION ORAL at 17:58

## 2023-01-01 RX ADMIN — PANTOPRAZOLE SODIUM 40 MILLIGRAM(S): 20 TABLET, DELAYED RELEASE ORAL at 07:07

## 2023-01-01 RX ADMIN — PANTOPRAZOLE SODIUM 40 MILLIGRAM(S): 20 TABLET, DELAYED RELEASE ORAL at 06:08

## 2023-01-01 RX ADMIN — Medication 0.25 MILLIGRAM(S): at 21:39

## 2023-01-01 RX ADMIN — Medication 25 MILLILITER(S): at 02:39

## 2023-01-01 RX ADMIN — Medication 3 MILLILITER(S): at 14:31

## 2023-01-01 RX ADMIN — Medication 3 MILLILITER(S): at 20:03

## 2023-01-01 RX ADMIN — SEVELAMER CARBONATE 800 MILLIGRAM(S): 2400 POWDER, FOR SUSPENSION ORAL at 17:40

## 2023-01-01 RX ADMIN — Medication 0.25 MILLIGRAM(S): at 21:14

## 2023-01-01 RX ADMIN — Medication 1 GRAM(S): at 12:35

## 2023-01-01 RX ADMIN — ALBUTEROL 2 PUFF(S): 90 AEROSOL, METERED ORAL at 08:47

## 2023-01-01 RX ADMIN — Medication 3 MILLILITER(S): at 03:15

## 2023-01-01 RX ADMIN — Medication 25 MILLIGRAM(S): at 14:36

## 2023-01-01 RX ADMIN — LATANOPROST 1 DROP(S): 0.05 SOLUTION/ DROPS OPHTHALMIC; TOPICAL at 21:26

## 2023-01-05 NOTE — ED ADULT NURSE NOTE - PRIMARY CARE PROVIDER
Alert-The patient is alert, awake and responds to voice. The patient is oriented to time, place, and person. The triage nurse is able to obtain subjective information.
MD

## 2023-01-09 NOTE — ED ADULT TRIAGE NOTE - CHIEF COMPLAINT QUOTE
Patient is sent from nursing home for evaluation of difficulty breathing, sweating.  As per EMT, patient has change in mental status x 3 hours now. Normal mental status alert, oriented x 3.  Opens eyes to verbal stimuli, slurred, incoherent speech, facial droop.  Patient is legally blind.

## 2023-01-09 NOTE — ED PROVIDER NOTE - NS ED MD DISPO SPECIAL CONSIDERATION1
Called pt regarding ultrasound biopsy on 8/6/21.  Pt's feeling alright, no bleeding, no pain, some bruising and no problems.   None

## 2023-01-09 NOTE — ED PROVIDER NOTE - OBJECTIVE STATEMENT
62 year old male with a PHMx of CAD, congestive heart failure, diabetes mellitus, hypertension, diabetic neuropathy and ESRD was referred from [facility] for trouble breathing and sweating. In transport to the hospital, patient became acutely confused. Patient unable to answer any questions and his finger stick was 36. Rapid IV access was obtained in the EG and 2 amp of 350. He opened his eyes and started to ask for food. Remainder of exam he has a fistula in his right arm. He does not know why he is here. Patient states he is due for dialysis tomorrow.  NKDA. 62 year old male with a PHMx of CAD, congestive heart failure, diabetes mellitus, hypertension, diabetic neuropathy and ESRD was referred from [facility] for trouble breathing and sweating. In transport to the hospital, patient became acutely confused. Patient unable to answer any questions and his finger stick was 36. Rapid IV access was obtained in the EG and 2 amp of 350. He opened his eyes and started to ask for food. Remainder of exam he has a fistula in his right arm. He does not know why he is here. Patient states he is due for dialysis tomorrow.  NKDA.

## 2023-01-10 NOTE — PROVIDER CONTACT NOTE (OTHER) - ASSESSMENT
Pt A&O X3, forgetful at times. Irritable, but able to be verbally redirected. Denies any chest pain, headache, dizziness, or sob. Legally blind, reports "I ate too fast, I am nauseous"

## 2023-01-10 NOTE — ED ADULT NURSE NOTE - OBJECTIVE STATEMENT
62 year old male with a PHMx of CAD, congestive heart failure, diabetes mellitus, hypertension, diabetic neuropathy and ESRD was referred from [facility] for trouble breathing and sweating. In transport to the hospital, patient became acutely confused. Patient unable to answer any questions and his finger stick was 36. Rapid IV access was obtained in the EJ and 2 amp of D50 given IV. He opened his eyes and started to ask for food. Remainder of exam he has a fistula in his right arm. He does not know why he is here. Patient states he is due for dialysis tomorrow.  NKDA.

## 2023-01-10 NOTE — ED ADULT NURSE NOTE - NS PRO PASSIVE SMOKE EXP
Group Topic: BH Process Group     Date: 2019  Start Time: 10:00 AM  End Time: 11:00 AM    Focus: Homework review/Check-in  Number in attendance: 7        Pt. reports the previous few days have been productive, unpacking. Pt reports that her moods have been fair. Her dog is doing well, adjusting to moving and didn’t like dog food. Pt reports that her moods have been good, sleep is good, and appetite is   Daily Habits/ Biofeedback      Sleepinhrs.  Walkinmin. Time at home: All. Time away from home: 2hrs. Entertainment: 2hrs      Coping Skills (What’s Up?)            Strength(s) used since last group?  Self-regulation and self-control and managing impulses by not shopping.                    In-group positive intervention homework   Finding your flow - Lose sense of time by organize her home, family helped her unpack.     Rony Osuna, LPC, CSAC, CSIT    Attendance: Present  Response Communication: Appropriate topic  MoodAffect: Appropriate to group  Behavior/Socialization: Appropriate to group  Thought Process: Appropriate  Patient Response: Appropriate feedback and Attentive   Unknown

## 2023-01-10 NOTE — H&P ADULT - NSHPPHYSICALEXAM_GEN_ALL_CORE
T(C): 36.1 (01-10-23 @ 00:11), Max: 36.9 (01-09-23 @ 20:17)  HR: 73 (01-10-23 @ 00:11) (73 - 84)  BP: 163/87 (01-10-23 @ 00:11) (163/87 - 170/84)  RR: 18 (01-10-23 @ 00:11) (18 - 20)  SpO2: 93% (01-10-23 @ 00:11) (93% - 99%)    CONSTITUTIONAL: Casually groomed, no apparent distress    HEENT: normotramuatic, acephalic, R pupil reactive to light, L pupil obstructed by cataract, EOMI, No conjunctival or scleral injection, non-icteric. Oral mucosa with moist membranes. No external nasal lesions; nasal mucosa not inflamed; no dentition; no pharyngeal injection or exudates.               Neck: supple, symmetric and without tracheal deviation; thyroid gland not enlarged and without palpable masses    RESPIRATORY: No respiratory distress, no use of accessory muscles; CTA b/l, no wheezes, rales or rhonchi    CARDIOVASCULAR: RRRR, +S1S2, no murmurs, no rubs, no gallops; no JVD; no peripheral edema  	Vascular:  carotid pulse palpable, radial pulse palpable    GASTROINTESTINAL: +BS, Soft, non tender, non distended, no rebound, no guarding; No palpable masses; no hepatosplenomegaly; no hernia palpated;    MUSCULOSKELETAL: examination of the (head/neck, spine/ribs/pelvis, RUE, LUE, RLE, LLE) without misalignment, no spinal tenderness, normal muscle strength/tone. Amputation site of L leg well healed, stump skin very dry. R foot toes has bunions and callouses, very dry skin.    SKIN: No rashes or ulcers noted; no subcutaneous nodules or induration palpable. Dry skin of legs and feet.    NEUROLOGIC: CN II-XII intact; normal reflexes in upper and lower extremities, sensation intact in upper and lower extremities b/l to light touch  PSYCHIATRIC: Appropriate insight/judgment; A+O x 3, mood and affect appropriate, recent/remote memory intact    LYMPHATIC: No cervical LAD or tenderness

## 2023-01-10 NOTE — ED ADULT NURSE REASSESSMENT NOTE - NS ED NURSE REASSESS COMMENT FT1
pt is uncooperative , refused admission  band placement , refused the heparin injection, refused all 6 am  po medications

## 2023-01-10 NOTE — H&P ADULT - PROBLEM SELECTOR PLAN 2
Pt known to have ESRD, dialysis T,T,S  Pt states he is due for dialysis on 1/10/2023 (Tues)    -nephro consult Dr. Morales in a.m. Pt known to have ESRD, dialysis T,T,S  Pt states he is due for dialysis on 1/10/2023 (Tues)    -nephro consulted Dr. Morales

## 2023-01-10 NOTE — PROVIDER CONTACT NOTE (OTHER) - RECOMMENDATIONS
provider made aware, will give hydralazine and metoprolol that is due ? pharmacy contacted for medications

## 2023-01-10 NOTE — CONSULT NOTE ADULT - ASSESSMENT
Pt is a 61 yo M w PMH ESRD on dialysis (Tues, Thurs, Sat), DM2, CAD s/p MI, CHF, glaucoma, legally blind, HTN, orthostatic hypotension, hyperparathyroid, hyperkalemia, LS spinal stenosis, osteoarthritis, osteoporosis, PAD, hx of osteomyelitis of thoracic vertebrae, remote hx of crack-cocaine use disorder, PSH of BKA of L leg, who was BIBEMS to ED from [facility] for trouble breathing and sweating.   Pt admits to swings in fsg as out pt with occ hypos- "more highs than lows ". Admits to having decent appetite. Does not recall insulin name or doses- per med rec on levemir 4 units   
Patient is a 62y Male whom presented to the hospital with SOB. Admitted in this hospital earlier this week with COVID 19.  He did not complete HD treatment on Thursday. Presented to ED with SOB and bilateral leg edema.  Renal consulted for HD     # ESRD. seen on hemodialysis   # anemia of CKD. epogen on HD   # CKD MBD. resume sevelamer 800mg three times per day ac  # Hypoglycemia- mx per endocrinology

## 2023-01-10 NOTE — CONSULT NOTE ADULT - PROBLEM SELECTOR RECOMMENDATION 9
with swings per pt -   monitor fsg ac and hs  check a1c  low dose prn admelog for now  adjust meds upo d/c   d/w prim team

## 2023-01-10 NOTE — H&P ADULT - ASSESSMENT
Pt is a 63 yo M w PMH ESRD on dialysis (Tues, Thurs, Sat), DM2, CAD s/p MI, CHF, glaucoma, legally blind, HTN, orthostatic hypotension, hyperparathyroid, hyperkalemia, LS spinal stenosis, osteoarthritis, osteoporosis, PAD, hx of osteomyelitis of thoracic vertebrae, remote hx of crack-cocaine use disorder, PSH of BKA of L leg, who was BIBEMS to ED from [facility] for trouble breathing and sweating, admitted for hypolgycemia.

## 2023-01-10 NOTE — H&P ADULT - NSHPSOCIALHISTORY_GEN_ALL_CORE
Smokes 1 pack cigarettes a month, interested in quitting. Denies etoh use and current drug use. Remote hx of crack-cocaine use. Smokes 1 pack cigarettes a month, interested in quitting. Denies etoh use and current drug use. Remote hx of crack-cocaine use.  Lives at [facility], ambulates w rollator

## 2023-01-10 NOTE — ED ADULT NURSE NOTE - RESPIRATION RHYTHM, QM
If you are a smoker, it is important for your health to stop smoking. Please be aware that second hand smoke is also harmful.
regular

## 2023-01-10 NOTE — CHART NOTE - NSCHARTNOTEFT_GEN_A_CORE
HPI:  Pt is a 63 yo M w PMH ESRD on dialysis ([facility], Thurs, Sat), DM2, CAD s/p MI, CHF, glaucoma, legally blind, HTN, orthostatic hypotension, hyperparathyroid, hyperkalemia, LS spinal stenosis, osteoarthritis, osteoporosis, PAD, hx of osteomyelitis of thoracic vertebrae, remote hx of crack-cocaine use disorder, PSH of BKA of L leg, who was BIBEMS to ED from [facility] for trouble breathing and sweating. As per pt, he was lying in his bed at the facility and felt lie he could not move, and felt like he was going to pass out. Next thing he remembers is waking up in the ED. As per ED note, pt became acutely confused on way to hospital. In ED, pt noted to have FSG of 36. Rapid IV access was obtained and 2 amp of D50 given, after which pt opened his eyes and asked for food.     Currently, pt endorses feeling a little dizzy and tired, but otherwise feels close to baseline. Denies dyspnea above baseline, chest pain, headache, fever, coughs, chills, n/v/d, (10 Deep 2023 02:20)        OBJECTIVE:  Vital Signs Last 24 Hrs  T(C): 36.9 (10 Deep 2023 12:50), Max: 36.9 (09 Jan 2023 20:17)  T(F): 98.4 (10 Deep 2023 12:50), Max: 98.4 (09 Jan 2023 20:17)  HR: 93 (10 Deep 2023 15:08) (73 - 93)  BP: 152/95 (10 Deep 2023 15:08) (148/86 - 170/84)  BP(mean): --  RR: 15 (10 Deep 2023 15:08) (15 - 20)  SpO2: 95% (10 Deep 2023 10:20) (93% - 99%)    Parameters below as of 10 Deep 2023 15:08  Patient On (Oxygen Delivery Method): room air        LABS:                        9.8    3.98  )-----------( 119      ( 10 Deep 2023 05:35 )             31.1     01-10    136  |  100  |  49<H>  ----------------------------<  92  5.1   |  23  |  16.10<H>    Ca    8.1<L>      10 Deep 2023 05:35  Phos  3.4     01-10  Mg     3.4     01-10    TPro  5.9<L>  /  Alb  2.8<L>  /  TBili  0.5  /  DBili  x   /  AST  10  /  ALT  14  /  AlkPhos  75  01-10      ASSESSMENT:  Hypoglycemia - 36 on admission, Endo consulted    PLAN:   Hold home LANTUS and 10U premeal  Endo consulted    FOLLOW UP/RESULTS:    Continue SSI  AM labs

## 2023-01-10 NOTE — H&P ADULT - PROBLEM SELECTOR PLAN 1
Admitted for hypoglycemia, FSG to 36 on admission  s/p 2 amps D50 in ED with good effect  Pt known to have labile blood sugars    -sliding scale lispro only for now  -f/u A1c  -consider endocrinology consult in a.m.

## 2023-01-10 NOTE — ED ADULT NURSE NOTE - ED STAT RN HANDOFF DETAILS
Pt admitted to medicine, awaiting bed. Pt uncooperative at times. Medication given as documented. Meal provided and tolerated well. IV 20g EJ and 20g to left hand intact and patent. Pt transferred to [facility]. Report given to Louise BORDEN.

## 2023-01-10 NOTE — H&P ADULT - PROBLEM SELECTOR PLAN 3
Pt has hx of HTN, known to have labile pressures    -cont home BP meds with parameters  -furosemide, metolazone, hydralazine, amlodipine. Metoprolol succinate --> tartrate BID.

## 2023-01-10 NOTE — CONSULT NOTE ADULT - SUBJECTIVE AND OBJECTIVE BOX
[facility] : Progress Note :: [phone], (office [phone]),   Dr Mosher / Dr Wasihngton / Dr Young / Dr Doll / Dr Varsha TURCIOS / Dr Tello / Dr Garcia / Dr Larry qiu  _____________________________________________________________________________________________  Patient is a 62y Male whom presented to the hospital with acute onset of shortness of breath and sweating. Noted to be hypoglycemic. S/P D50W with improvement of symptoms.  blood sugar improved  renal consulted for ESRD.  seen on  HD today     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      Osteoarthritis      Vision loss of left eye  blind      ESRD on hemodialysis  T/Th/S      Cataract  both eyes - hx of sx done      BPH (benign prostatic hyperplasia)      UTI (urinary tract infection)  hx of      Bladder mass  hx of      H/O hematuria      Osteoporosis      Vision loss of right eye  decreased      Depression      Chronic GERD      Osteomyelitis of vertebra      CHF (congestive heart failure)      Below knee amputation status, left  2012- pt is wearing prostesis      History of right cataract extraction      History of left cataract extraction      S/P arteriovenous (AV) fistula creation  right arm brachiocephalic arteriovenous fistula on 11/08/2018      H/O hematuria  s/p bladder bx and fulguration 2/25/2020      H/O transurethral destruction of bladder lesion  2020      History of excision of mass  back mass on 03/31/2021        fish (Rash)  liver (Anaphylaxis)  No Known Drug Allergies    Home Medications Reviewed  Hospital Medications:   MEDICATIONS  (STANDING):  albuterol/ipratropium for Nebulization 3 milliLiter(s) Nebulizer every 8 hours  ALPRAZolam 0.25 milliGRAM(s) Oral at bedtime  amLODIPine   Tablet 10 milliGRAM(s) Oral daily  budesonide 160 MICROgram(s)/formoterol 4.5 MICROgram(s) Inhaler 2 Puff(s) Inhalation two times a day  calcium carbonate    500 mG (Tums) Chewable 1 Tablet(s) Chew two times a day  cholecalciferol 1000 Unit(s) Oral daily  cyanocobalamin 1000 MICROGram(s) Oral daily  epoetin beverley-epbx (RETACRIT) Injectable 4000 Unit(s) IV Push <User Schedule>  ferrous    sulfate 325 milliGRAM(s) Oral daily  folic acid 1 milliGRAM(s) Oral daily  furosemide    Tablet 80 milliGRAM(s) Oral daily  heparin   Injectable 5000 Unit(s) SubCutaneous every 8 hours  hydrALAZINE 25 milliGRAM(s) Oral three times a day  insulin lispro (ADMELOG) corrective regimen sliding scale   SubCutaneous three times a day before meals  insulin lispro (ADMELOG) corrective regimen sliding scale   SubCutaneous at bedtime  levothyroxine 25 MICROGram(s) Oral daily  lidocaine   4% Patch 1 Patch Transdermal daily  metoclopramide 10 milliGRAM(s) Oral three times a day  metolazone 10 milliGRAM(s) Oral daily  metoprolol tartrate 12.5 milliGRAM(s) Oral two times a day  mupirocin 2% Ointment 1 Application(s) Topical two times a day  Nephro-luann 1 Tablet(s) Oral daily  nicotine - 21 mG/24Hr(s) Patch 1 Patch Transdermal daily  pantoprazole    Tablet 40 milliGRAM(s) Oral two times a day  simvastatin 40 milliGRAM(s) Oral at bedtime  sodium zirconium cyclosilicate 10 Gram(s) Oral <User Schedule>  sucralfate suspension 1 Gram(s) Oral four times a day    SOCIAL HISTORY:  Denies ETOh,Smoking,   FAMILY HISTORY:  Family history of cirrhosis of liver (Mother)    Family history of renal failure (Sibling)    Family history of hypertension (Sibling)    Family history of diabetes mellitus (Sibling)    History of substance abuse in sibling (Sibling)          VITALS:  T(F): 98.4 (01-10-23 @ 12:50), Max: 98.4 (01-09-23 @ 20:17)  HR: 93 (01-10-23 @ 15:08)  BP: 152/95 (01-10-23 @ 15:08)  RR: 15 (01-10-23 @ 15:08)  SpO2: 95% (01-10-23 @ 10:20)  Wt(kg): --    Height (cm): 167.6 (01-09 @ 20:17)  PHYSICAL EXAM:  Constitutional: NAD  HEENT: anicteric sclera, oropharynx clear,.  Neck: No JVD  Respiratory: CTAB, no wheezes, rales or rhonchi  Cardiovascular: S1, S2, RRR  Gastrointestinal: BS+, soft, NT/ND  Extremities:  No peripheral edema  Neurological: A/O x 3, no focal deficits   Vascular Access: AVF with thrill and bruit . swollen RT upper extremity     LABS:  01-10    136  |  100  |  49<H>  ----------------------------<  92  5.1   |  23  |  16.10<H>    Ca    8.1<L>      10 Deep 2023 05:35  Phos  3.4     01-10  Mg     3.4     01-10    TPro  5.9<L>  /  Alb  2.8<L>  /  TBili  0.5  /  DBili      /  AST  10  /  ALT  14  /  AlkPhos  75  01-10    Creatinine Trend: 16.10 <--, 16.10 <--                        9.8    3.98  )-----------( 119      ( 10 Deep 2023 05:35 )             31.1     Urine Studies:      RADIOLOGY & ADDITIONAL STUDIES:                
Patient is a 62y old  Male who presents with a chief complaint of     HPI:  Pt is a 63 yo M w PMH ESRD on dialysis ([place], Thurs, Sat), DM2, CAD s/p MI, CHF, glaucoma, legally blind, HTN, orthostatic hypotension, hyperparathyroid, hyperkalemia, LS spinal stenosis, osteoarthritis, osteoporosis, PAD, hx of osteomyelitis of thoracic vertebrae, remote hx of crack-cocaine use disorder, PSH of BKA of L leg, who was BIBEMS to ED from [facility] for trouble breathing and sweating. As per pt, he was lying in his bed at the facility and felt lie he could not move, and felt like he was going to pass out. Next thing he remembers is waking up in the ED. As per ED note, pt became acutely confused on way to hospital. In ED, pt noted to have FSG of 36. Rapid IV access was obtained and 2 amp of D50 given, after which pt opened his eyes and asked for food.     Currently, pt endorses feeling a little dizzy and tired, but otherwise feels close to baseline. Denies dyspnea above baseline, chest pain, headache, fever, coughs, chills, n/v/d, (10 Deep 2023 02:20)  Pt admits to swings in fsg as out pt with occ hypos- "more highs than lows ". Admits to having decent appetite. Does not recall insulin name or doses- per med rec on levemir 4 units     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      Osteoarthritis      Vision loss of left eye  blind      ESRD on hemodialysis  T/Th/S      Cataract  both eyes - hx of sx done      BPH (benign prostatic hyperplasia)      UTI (urinary tract infection)  hx of      Bladder mass  hx of      H/O hematuria      Osteoporosis      Vision loss of right eye  decreased      Depression      Chronic GERD      Osteomyelitis of vertebra      CHF (congestive heart failure)      Below knee amputation status, left  2012- pt is wearing prostesis      History of right cataract extraction      History of left cataract extraction      S/P arteriovenous (AV) fistula creation  right arm brachiocephalic arteriovenous fistula on 11/08/2018      H/O hematuria  s/p bladder bx and fulguration 2/25/2020      H/O transurethral destruction of bladder lesion  2020      History of excision of mass  back mass on 03/31/2021             MEDICATIONS  (STANDING):  albuterol/ipratropium for Nebulization 3 milliLiter(s) Nebulizer every 8 hours  ALPRAZolam 0.25 milliGRAM(s) Oral at bedtime  amLODIPine   Tablet 10 milliGRAM(s) Oral daily  budesonide 160 MICROgram(s)/formoterol 4.5 MICROgram(s) Inhaler 2 Puff(s) Inhalation two times a day  calcium carbonate    500 mG (Tums) Chewable 1 Tablet(s) Chew two times a day  cholecalciferol 1000 Unit(s) Oral daily  cyanocobalamin 1000 MICROGram(s) Oral daily  epoetin beverley-epbx (RETACRIT) Injectable 4000 Unit(s) IV Push <User Schedule>  ferrous    sulfate 325 milliGRAM(s) Oral daily  folic acid 1 milliGRAM(s) Oral daily  furosemide    Tablet 80 milliGRAM(s) Oral daily  heparin   Injectable 5000 Unit(s) SubCutaneous every 8 hours  hydrALAZINE 25 milliGRAM(s) Oral three times a day  insulin lispro (ADMELOG) corrective regimen sliding scale   SubCutaneous three times a day before meals  insulin lispro (ADMELOG) corrective regimen sliding scale   SubCutaneous at bedtime  levothyroxine 25 MICROGram(s) Oral daily  lidocaine   4% Patch 1 Patch Transdermal daily  metoclopramide 10 milliGRAM(s) Oral three times a day  metolazone 10 milliGRAM(s) Oral daily  metoprolol tartrate 12.5 milliGRAM(s) Oral two times a day  mupirocin 2% Ointment 1 Application(s) Topical two times a day  Nephro-luann 1 Tablet(s) Oral daily  nicotine - 21 mG/24Hr(s) Patch 1 Patch Transdermal daily  pantoprazole    Tablet 40 milliGRAM(s) Oral two times a day  simvastatin 40 milliGRAM(s) Oral at bedtime  sodium zirconium cyclosilicate 10 Gram(s) Oral <User Schedule>  sucralfate suspension 1 Gram(s) Oral four times a day    MEDICATIONS  (PRN):  acetaminophen     Tablet .. 650 milliGRAM(s) Oral every 6 hours PRN Temp greater or equal to 38C (100.4F), Mild Pain (1 - 3)  diphenhydrAMINE 25 milliGRAM(s) Oral at bedtime PRN Rash and/or Itching  ondansetron    Tablet 4 milliGRAM(s) Oral every 8 hours PRN Nausea and/or Vomiting  oxyCODONE    IR 5 milliGRAM(s) Oral every 8 hours PRN Severe Pain (7 - 10)  zolpidem 5 milliGRAM(s) Oral at bedtime PRN Insomnia      FAMILY HISTORY:  Family history of cirrhosis of liver (Mother)    Family history of renal failure (Sibling)    Family history of hypertension (Sibling)    Family history of diabetes mellitus (Sibling)    History of substance abuse in sibling (Sibling)        SOCIAL HISTORY:      REVIEW OF SYSTEMS:  CONSTITUTIONAL: No fever, weight loss, or fatigue  EYES: No eye pain, visual disturbances, or discharge  ENT:  No difficulty hearing, tinnitus, vertigo; No sinus or throat pain  NECK: No pain or stiffness  RESPIRATORY: No cough, wheezing, chills or hemoptysis; No Shortness of Breath  CARDIOVASCULAR: No chest pain, palpitations, passing out, dizziness, or leg swelling  GASTROINTESTINAL: No abdominal or epigastric pain. No nausea, vomiting, or hematemesis; No diarrhea or constipation. No melena or hematochezia.  GENITOURINARY: No dysuria, frequency, hematuria, or incontinence  NEUROLOGICAL: No headaches, memory loss, loss of strength, numbness, or tremors  SKIN: No itching, burning, rashes, or lesions   LYMPH Nodes: No enlarged glands  ENDOCRINE: No heat or cold intolerance; No hair loss  MUSCULOSKELETAL: No joint pain or swelling; No muscle, back, or extremity pain  PSYCHIATRIC: No depression, anxiety, mood swings, or difficulty sleeping  HEME/LYMPH: No easy bruising, or bleeding gums  ALLERGY AND IMMUNOLOGIC: No hives or eczema	        Vital Signs Last 24 Hrs  T(C): 36.5 (10 Deep 2023 04:19), Max: 36.9 (09 Jan 2023 20:17)  T(F): 97.7 (10 Deep 2023 04:19), Max: 98.4 (09 Jan 2023 20:17)  HR: 86 (10 Deep 2023 04:19) (73 - 86)  BP: 163/84 (10 Deep 2023 04:19) (163/84 - 170/84)  BP(mean): --  RR: 18 (10 Deep 2023 04:19) (18 - 20)  SpO2: 94% (10 Deep 2023 04:19) (93% - 99%)    Parameters below as of 10 Deep 2023 04:19  Patient On (Oxygen Delivery Method): room air          Constitutional:    HEENT: nad    Neck:  No JVD, bruits or thyromegaly    Respiratory:  Clear without rales or rhonchi    Cardiovascular:  RR without murmur, rub or gallop.    Gastrointestinal: Soft without hepatosplenomegaly.    Extremities: without cyanosis, clubbing or edema.    Neurological:  Oriented   x  3    . No gross sensory or motor defects.        LABS:                        9.8    3.98  )-----------( 119      ( 10 Deep 2023 05:35 )             31.1     01-10    136  |  100  |  49<H>  ----------------------------<  92  5.1   |  23  |  16.10<H>    Ca    8.1<L>      10 Deep 2023 05:35  Phos  3.4     01-10  Mg     3.4     01-10    TPro  5.9<L>  /  Alb  2.8<L>  /  TBili  0.5  /  DBili  x   /  AST  10  /  ALT  14  /  AlkPhos  75  01-10        PT/INR - ( 09 Jan 2023 21:13 )   PT: 12.3 sec;   INR: 1.03 ratio         PTT - ( 09 Jan 2023 21:13 )  PTT:43.3 sec    CAPILLARY BLOOD GLUCOSE      POCT Blood Glucose.: 149 mg/dL (10 Deep 2023 09:27)  POCT Blood Glucose.: 103 mg/dL (09 Jan 2023 22:31)  POCT Blood Glucose.: 386 mg/dL (09 Jan 2023 20:45)  POCT Blood Glucose.: 36 mg/dL (09 Jan 2023 20:38)      RADIOLOGY & ADDITIONAL STUDIES:

## 2023-01-10 NOTE — ED ADULT NURSE NOTE - NSFALLRSKOUTCOME_ED_ALL_ED
[address]  [id number]  Phone: [phone]  Fax: [phone]    Vidhya Machuca MD        December 7, 2017     Patient: Debbie Jimenes   YOB: 1931   Date of Visit: 12/7/2017       To Whom It May Concern: It is my medical opinion that [place] Gamino requires a disability parking placard for the following reasons:  Osteoporosis, Kyphoscoliosis, Osteoarthritis    Duration of need: 5 years    If you have any questions or concerns, please don't hesitate to call.     Sincerely,    Vidhya Machuca MD
Fall with Harm Risk

## 2023-01-10 NOTE — ED ADULT NURSE NOTE - NSIMPLEMENTINTERV_GEN_ALL_ED
Implemented All Fall with Harm Risk Interventions:  [place] to call system. Call bell, personal items and telephone within reach. Instruct patient to call for assistance. Room bathroom lighting operational. Non-slip footwear when patient is off stretcher. Physically safe environment: no spills, clutter or unnecessary equipment. Stretcher in lowest position, wheels locked, appropriate side rails in place. Provide visual cue, wrist band, yellow gown, etc. Monitor gait and stability. Monitor for mental status changes and reorient to person, place, and time. Review medications for side effects contributing to fall risk. Reinforce activity limits and safety measures with patient and family. Provide visual clues: red socks.

## 2023-01-10 NOTE — H&P ADULT - HISTORY OF PRESENT ILLNESS
Pt is a 61 yo M w [facility]  Pt is a 63 yo M w PMH ESRD on dialysis (Tues, Thurs, Sat), DM2, CAD s/p MI, CHF, glaucoma, legally blind, HTN, orthostatic hypotension, hyperparathyroid, hyperkalemia, LS spinal stenosis, osteoarthritis, osteoporosis, PAD, hx of osteomyelitis of thoracic vertebrae, remote hx of crack-cocaine use disorder, PSH of BKA of L leg, who was BIBEMS to ED from [facility] for trouble breathing and sweating. As per pt, he was lying in his bed at the facility and felt lie he could not move, and felt like he was going to pass out. Next thing he remembers is waking up in the ED. As per ED note, pt became acutely confused on way to hospital. In ED, pt noted to have FSG of 36. Rapid IV access was obtained and 2 amp of D50 given, after which pt opened his eyes and asked for food.     Currently, pt endorses feeling a little dizzy and tired, but otherwise feels close to baseline. Denies dyspnea above baseline, chest pain, headache, fever, coughs, chills, n/v/d,

## 2023-01-10 NOTE — H&P ADULT - NSHPREVIEWOFSYSTEMS_GEN_ALL_CORE
REVIEW OF SYSTEMS:  CONSTITUTIONAL: Denies weakness, fevers or chills  HEENT: Denies headache, dizziness, visual changes, vertigo, throat pain   RESPIRATORY: Denies cough, wheezing, hemoptysis; denies shortness of breath  CARDIOVASCULAR: denies chest pain or palpitations  GASTROINTESTINAL: denies abdominal  pain, nausea, vomiting, diarrhea, constipation. Denies melena and hematochezia.  GENITOURINARY: Denies dysuria, frequency or hematuria  NEUROLOGICAL: Denies numbness or weakness  SKIN: Denies itching, rashes

## 2023-01-11 NOTE — PROGRESS NOTE ADULT - SUBJECTIVE AND OBJECTIVE BOX
NP Note discussed with  primary attending    Patient is a 62y old  Male who presents with a chief complaint of hypoglycemia (10 Deep 2023 18:00)      INTERVAL HPI/OVERNIGHT EVENTS: No new complaints or concerns.  Denies pain.  Denies HA, dizziness, SOB, CP, or abdominal pain.      MEDICATIONS  (STANDING):  albuterol/ipratropium for Nebulization 3 milliLiter(s) Nebulizer every 8 hours  ALPRAZolam 0.25 milliGRAM(s) Oral at bedtime  amLODIPine   Tablet 10 milliGRAM(s) Oral daily  budesonide 160 MICROgram(s)/formoterol 4.5 MICROgram(s) Inhaler 2 Puff(s) Inhalation two times a day  calcium carbonate    500 mG (Tums) Chewable 1 Tablet(s) Chew two times a day  chlorhexidine 2% Cloths 1 Application(s) Topical <User Schedule>  cholecalciferol 1000 Unit(s) Oral daily  cyanocobalamin 1000 MICROGram(s) Oral daily  epoetin beverley-epbx (RETACRIT) Injectable 4000 Unit(s) IV Push <User Schedule>  ferrous    sulfate 325 milliGRAM(s) Oral daily  folic acid 1 milliGRAM(s) Oral daily  furosemide    Tablet 80 milliGRAM(s) Oral daily  heparin   Injectable 5000 Unit(s) SubCutaneous every 8 hours  hydrALAZINE 25 milliGRAM(s) Oral three times a day  insulin lispro (ADMELOG) corrective regimen sliding scale   SubCutaneous three times a day before meals  insulin lispro (ADMELOG) corrective regimen sliding scale   SubCutaneous at bedtime  levothyroxine 25 MICROGram(s) Oral daily  lidocaine   4% Patch 1 Patch Transdermal daily  metoclopramide 10 milliGRAM(s) Oral three times a day  metolazone 10 milliGRAM(s) Oral daily  metoprolol tartrate 12.5 milliGRAM(s) Oral two times a day  mupirocin 2% Ointment 1 Application(s) Topical two times a day  Nephro-luann 1 Tablet(s) Oral daily  nicotine - 21 mG/24Hr(s) Patch 1 Patch Transdermal daily  pantoprazole    Tablet 40 milliGRAM(s) Oral two times a day  simvastatin 40 milliGRAM(s) Oral at bedtime  sodium zirconium cyclosilicate 10 Gram(s) Oral <User Schedule>  sucralfate suspension 1 Gram(s) Oral four times a day    MEDICATIONS  (PRN):  acetaminophen     Tablet .. 650 milliGRAM(s) Oral every 6 hours PRN Temp greater or equal to 38C (100.4F), Mild Pain (1 - 3)  diphenhydrAMINE 25 milliGRAM(s) Oral at bedtime PRN Rash and/or Itching  ondansetron Injectable 4 milliGRAM(s) IV Push every 8 hours PRN Nausea and/or Vomiting  oxyCODONE    IR 5 milliGRAM(s) Oral every 8 hours PRN Severe Pain (7 - 10)  zolpidem 5 milliGRAM(s) Oral at bedtime PRN Insomnia      __________________________________________________  REVIEW OF SYSTEMS:    CONSTITUTIONAL: No fever  EYES: No acute visual disturbances  NECK: No pain or stiffness  RESPIRATORY: No cough; No shortness of breath  CARDIOVASCULAR: No chest pain, no palpitations  GASTROINTESTINAL: No pain. No nausea or vomiting.  No diarrhea   NEUROLOGICAL: No headache or numbness, no tremors  MUSCULOSKELETAL: No joint pain, no muscle pain  GENITOURINARY: No dysuria, no frequency, no hesitancy  PSYCHIATRY: No depression , no anxiety  ALL OTHER  ROS negative        Vital Signs Last 24 Hrs  T(C): 37.2 (11 Jan 2023 13:15), Max: 37.2 (11 Jan 2023 13:15)  T(F): 99 (11 Jan 2023 13:15), Max: 99 (11 Jan 2023 13:15)  HR: 95 (11 Jan 2023 13:15) (91 - 100)  BP: 161/78 (11 Jan 2023 13:15) (147/68 - 170/81)  RR: 17 (11 Jan 2023 13:15) (16 - 17)  SpO2: 97% (11 Jan 2023 13:15) (94% - 97%)    Parameters below as of 11 Jan 2023 13:15  Patient On (Oxygen Delivery Method): room air        ________________________________________________  PHYSICAL EXAM:  GENERAL: NAD  HEENT: Normocephalic;  conjunctivae and sclerae clear; moist mucous membranes   NECK : Supple  CHEST/LUNG: Clear to auscultation bilaterally with good air entry   HEART: S1 S2  regular; no murmurs, gallops or rubs  ABDOMEN: Soft, Nontender, Nondistended; Bowel sounds present x 4 quad  EXTREMITIES: No cyanosis; (+)b/l LE and RUE edema; no calf tenderness.  (+) Right AVF.    SKIN: Warm and dry; no rash  NERVOUS SYSTEM:  Awake and alert; Oriented to place, person and time; no new deficits    _________________________________________________  LABS:                        8.7    2.64  )-----------( 94       ( 11 Jan 2023 06:50 )             26.9     01-11    139  |  102  |  30<H>  ----------------------------<  94  4.0   |  26  |  12.10<H>    Ca    7.9<L>      11 Jan 2023 06:50  Phos  2.4     01-11  Mg     2.8     01-11    TPro  5.9<L>  /  Alb  2.8<L>  /  TBili  0.5  /  DBili  x   /  AST  10  /  ALT  14  /  AlkPhos  75  01-10    PT/INR - ( 09 Jan 2023 21:13 )   PT: 12.3 sec;   INR: 1.03 ratio         PTT - ( 09 Jan 2023 21:13 )  PTT:43.3 sec    CAPILLARY BLOOD GLUCOSE      POCT Blood Glucose.: 150 mg/dL (11 Jan 2023 16:29)  POCT Blood Glucose.: 145 mg/dL (11 Jan 2023 11:33)  POCT Blood Glucose.: 96 mg/dL (11 Jan 2023 07:45)  POCT Blood Glucose.: 123 mg/dL (10 Deep 2023 20:59)  POCT Blood Glucose.: 111 mg/dL (10 Deep 2023 18:02)        RADIOLOGY & ADDITIONAL TESTS:    Imaging Personally Reviewed:  YES    Consultant(s) Notes Reviewed:   YES    Care Discussed with Consultants :     Plan of care was discussed with patient and /or primary care giver; all questions and concerns were addressed and care was aligned with patient's wishes.     NP Note discussed with  primary attending    Patient is a 62y old  Male who presents with a chief complaint of hypoglycemia (10 Deep 2023 18:00)      INTERVAL HPI/OVERNIGHT EVENTS: No new complaints or concerns.  Denies pain.  Denies HA, dizziness, SOB, CP, or abdominal pain.      MEDICATIONS  (STANDING):  albuterol/ipratropium for Nebulization 3 milliLiter(s) Nebulizer every 8 hours  ALPRAZolam 0.25 milliGRAM(s) Oral at bedtime  amLODIPine   Tablet 10 milliGRAM(s) Oral daily  budesonide 160 MICROgram(s)/formoterol 4.5 MICROgram(s) Inhaler 2 Puff(s) Inhalation two times a day  calcium carbonate    500 mG (Tums) Chewable 1 Tablet(s) Chew two times a day  chlorhexidine 2% Cloths 1 Application(s) Topical <User Schedule>  cholecalciferol 1000 Unit(s) Oral daily  cyanocobalamin 1000 MICROGram(s) Oral daily  epoetin beverley-epbx (RETACRIT) Injectable 4000 Unit(s) IV Push <User Schedule>  ferrous    sulfate 325 milliGRAM(s) Oral daily  folic acid 1 milliGRAM(s) Oral daily  furosemide    Tablet 80 milliGRAM(s) Oral daily  heparin   Injectable 5000 Unit(s) SubCutaneous every 8 hours  hydrALAZINE 25 milliGRAM(s) Oral three times a day  insulin lispro (ADMELOG) corrective regimen sliding scale   SubCutaneous three times a day before meals  insulin lispro (ADMELOG) corrective regimen sliding scale   SubCutaneous at bedtime  levothyroxine 25 MICROGram(s) Oral daily  lidocaine   4% Patch 1 Patch Transdermal daily  metoclopramide 10 milliGRAM(s) Oral three times a day  metolazone 10 milliGRAM(s) Oral daily  metoprolol tartrate 12.5 milliGRAM(s) Oral two times a day  mupirocin 2% Ointment 1 Application(s) Topical two times a day  Nephro-luann 1 Tablet(s) Oral daily  nicotine - 21 mG/24Hr(s) Patch 1 Patch Transdermal daily  pantoprazole    Tablet 40 milliGRAM(s) Oral two times a day  simvastatin 40 milliGRAM(s) Oral at bedtime  sodium zirconium cyclosilicate 10 Gram(s) Oral <User Schedule>  sucralfate suspension 1 Gram(s) Oral four times a day    MEDICATIONS  (PRN):  acetaminophen     Tablet .. 650 milliGRAM(s) Oral every 6 hours PRN Temp greater or equal to 38C (100.4F), Mild Pain (1 - 3)  diphenhydrAMINE 25 milliGRAM(s) Oral at bedtime PRN Rash and/or Itching  ondansetron Injectable 4 milliGRAM(s) IV Push every 8 hours PRN Nausea and/or Vomiting  oxyCODONE    IR 5 milliGRAM(s) Oral every 8 hours PRN Severe Pain (7 - 10)  zolpidem 5 milliGRAM(s) Oral at bedtime PRN Insomnia      __________________________________________________  REVIEW OF SYSTEMS:    CONSTITUTIONAL: No fever  EYES: No acute visual disturbances  NECK: No pain or stiffness  RESPIRATORY: No cough; No shortness of breath  CARDIOVASCULAR: No chest pain, no palpitations  GASTROINTESTINAL: No pain. No nausea or vomiting.  No diarrhea   NEUROLOGICAL: No headache or numbness, no tremors  MUSCULOSKELETAL: No joint pain, no muscle pain  GENITOURINARY: No dysuria, no frequency, no hesitancy  PSYCHIATRY: No depression , no anxiety  ALL OTHER  ROS negative        Vital Signs Last 24 Hrs  T(C): 37.2 (11 Jan 2023 13:15), Max: 37.2 (11 Jan 2023 13:15)  T(F): 99 (11 Jan 2023 13:15), Max: 99 (11 Jan 2023 13:15)  HR: 95 (11 Jan 2023 13:15) (91 - 100)  BP: 161/78 (11 Jan 2023 13:15) (147/68 - 170/81)  RR: 17 (11 Jan 2023 13:15) (16 - 17)  SpO2: 97% (11 Jan 2023 13:15) (94% - 97%)    Parameters below as of 11 Jan 2023 13:15  Patient On (Oxygen Delivery Method): room air        ________________________________________________  PHYSICAL EXAM:  GENERAL: NAD  HEENT: Normocephalic;  conjunctivae and sclerae clear; moist mucous membranes   NECK : Supple  CHEST/LUNG: Clear to auscultation bilaterally with good air entry   HEART: S1 S2  regular; no murmurs, gallops or rubs  ABDOMEN: Soft, Nontender, Nondistended; Bowel sounds present x 4 quad  EXTREMITIES: No cyanosis; (+)b/l E and RUE edema; (+) L BKA; no calf tenderness.  (+) Right AVF.    SKIN: Warm and dry; no rash  NERVOUS SYSTEM:  Awake and alert; Oriented to place, person and time; no new deficits    _________________________________________________  LABS:                        8.7    2.64  )-----------( 94       ( 11 Jan 2023 06:50 )             26.9     01-11    139  |  102  |  30<H>  ----------------------------<  94  4.0   |  26  |  12.10<H>    Ca    7.9<L>      11 Jan 2023 06:50  Phos  2.4     01-11  Mg     2.8     01-11    TPro  5.9<L>  /  Alb  2.8<L>  /  TBili  0.5  /  DBili  x   /  AST  10  /  ALT  14  /  AlkPhos  75  01-10    PT/INR - ( 09 Jan 2023 21:13 )   PT: 12.3 sec;   INR: 1.03 ratio         PTT - ( 09 Jan 2023 21:13 )  PTT:43.3 sec    CAPILLARY BLOOD GLUCOSE      POCT Blood Glucose.: 150 mg/dL (11 Jan 2023 16:29)  POCT Blood Glucose.: 145 mg/dL (11 Jan 2023 11:33)  POCT Blood Glucose.: 96 mg/dL (11 Jan 2023 07:45)  POCT Blood Glucose.: 123 mg/dL (10 Deep 2023 20:59)  POCT Blood Glucose.: 111 mg/dL (10 Deep 2023 18:02)        RADIOLOGY & ADDITIONAL TESTS:    Imaging Personally Reviewed:  YES    Consultant(s) Notes Reviewed:   YES    Care Discussed with Consultants :     Plan of care was discussed with patient and /or primary care giver; all questions and concerns were addressed and care was aligned with patient's wishes.

## 2023-01-11 NOTE — PROGRESS NOTE ADULT - CONVERSATION DETAILS
CASE DISCUSSED AT LENGTH WITH PATIENT AND BROTHER ARTEMIO YOUNG [@ [phone]].    DISCUSSED WITH PATIENT THAT WITH MULTIPLE MEDICAL COMORBIDITIES THAT ARE PROGRESSIVELY WORSENING, PATIENT'S PROGNOSIS IS POOR. PATIENT AND BROTHER VERBALIZED UNDERSTANDING. IN DISCUSSION REGARDING GOALS OF CARE - PATIENT EXPRESSED HE WISHES TO BE FULL CODE. PATIENT ENCOURAGED BY TEAM AND BY BROTHER TO BE COMPLIANT WITH MEDICAL RECOMMENDATIONS AS RISKS FOR NONCOMPLIANCE INCLUDE BUT ARE NOT LIMITED TO WORSENING PATHOLOGY AND EVEN DEATH. BOTH PATIENT AND BROTHER VERBALIZED UNDERSTANDING.

## 2023-01-11 NOTE — PROGRESS NOTE ADULT - PROBLEM SELECTOR PLAN 1
- S/p 2 amps D50 in ED with good effect  - Continue to monitor FS  - C/w HSS  - Endo-Dr. Rothman consulted, appreciated

## 2023-01-11 NOTE — PROGRESS NOTE ADULT - PROBLEM SELECTOR PLAN 8
H/o OA on Tylenol, Lidocaine patch, & Oxycodone PRN  - C/w Tylenol, Lidocaine patch, & Oxycodone PRN

## 2023-01-11 NOTE — PROGRESS NOTE ADULT - SUBJECTIVE AND OBJECTIVE BOX
[facility] : Progress Note :: [phone], (office [phone]),   Dr Mosher / Dr Washington / Dr Young / Dr Doll / Dr Varsha TURCIOS / Dr Tello / Dr Garcia / Dr Larry qiu  _____________________________________________________________________________________________    RUEswollen    fish (Rash)  liver (Anaphylaxis)  No Known Drug Allergies    Hospital Medications:   MEDICATIONS  (STANDING):  albuterol/ipratropium for Nebulization 3 milliLiter(s) Nebulizer every 8 hours  ALPRAZolam 0.25 milliGRAM(s) Oral at bedtime  amLODIPine   Tablet 10 milliGRAM(s) Oral daily  budesonide 160 MICROgram(s)/formoterol 4.5 MICROgram(s) Inhaler 2 Puff(s) Inhalation two times a day  calcium carbonate    500 mG (Tums) Chewable 1 Tablet(s) Chew two times a day  chlorhexidine 2% Cloths 1 Application(s) Topical <User Schedule>  cholecalciferol 1000 Unit(s) Oral daily  cyanocobalamin 1000 MICROGram(s) Oral daily  epoetin beverley-epbx (RETACRIT) Injectable 4000 Unit(s) IV Push <User Schedule>  ferrous    sulfate 325 milliGRAM(s) Oral daily  folic acid 1 milliGRAM(s) Oral daily  furosemide    Tablet 80 milliGRAM(s) Oral daily  heparin   Injectable 5000 Unit(s) SubCutaneous every 8 hours  hydrALAZINE 25 milliGRAM(s) Oral three times a day  insulin lispro (ADMELOG) corrective regimen sliding scale   SubCutaneous three times a day before meals  insulin lispro (ADMELOG) corrective regimen sliding scale   SubCutaneous at bedtime  levothyroxine 25 MICROGram(s) Oral daily  lidocaine   4% Patch 1 Patch Transdermal daily  metoclopramide 10 milliGRAM(s) Oral three times a day  metolazone 10 milliGRAM(s) Oral daily  metoprolol tartrate 12.5 milliGRAM(s) Oral two times a day  mupirocin 2% Ointment 1 Application(s) Topical two times a day  Nephro-luann 1 Tablet(s) Oral daily  nicotine - 21 mG/24Hr(s) Patch 1 Patch Transdermal daily  pantoprazole    Tablet 40 milliGRAM(s) Oral two times a day  simvastatin 40 milliGRAM(s) Oral at bedtime  sodium zirconium cyclosilicate 10 Gram(s) Oral <User Schedule>  sucralfate suspension 1 Gram(s) Oral four times a day        VITALS:  T(F): 99 (01-11-23 @ 13:15), Max: 99 (01-11-23 @ 13:15)  HR: 95 (01-11-23 @ 13:15)  BP: 161/78 (01-11-23 @ 13:15)  RR: 17 (01-11-23 @ 13:15)  SpO2: 97% (01-11-23 @ 13:15)  Wt(kg): --    01-10 @ 07:01  -  01-11 @ 07:00  --------------------------------------------------------  IN: 0 mL / OUT: 1 mL / NET: -1 mL        Weight (kg): 56.4 (01-11 @ 06:55)  PHYSICAL EXAM:  Constitutional: NAD  HEENT: anicteric sclera, oropharynx clear.  Neck: No JVD  Respiratory: CTAB, no wheezes, rales or rhonchi  Cardiovascular: S1, S2, RRR  Gastrointestinal: BS+, soft, NT/ND  Extremities:  No peripheral edema  Neurological: A/O x 3, no focal deficits.  Vascular Access: RUEAVF swollen UPPER EXTREMITY SWOLLEN.     LABS:  01-11    139  |  102  |  30<H>  ----------------------------<  94  4.0   |  26  |  12.10<H>    Ca    7.9<L>      11 Jan 2023 06:50  Phos  2.4     01-11  Mg     2.8     01-11    TPro  5.9<L>  /  Alb  2.8<L>  /  TBili  0.5  /  DBili      /  AST  10  /  ALT  14  /  AlkPhos  75  01-10    Creatinine Trend: 12.10 <--, 16.10 <--, 16.10 <--                        8.7    2.64  )-----------( 94       ( 11 Jan 2023 06:50 )             26.9     Urine Studies:      RADIOLOGY & ADDITIONAL STUDIES:

## 2023-01-11 NOTE — PROGRESS NOTE ADULT - ASSESSMENT
62 year old, Male, w/ PMH of ESRD on dialysis (Tues, Thyolanda, Sat), DM2, CAD s/p MI, CHF, glaucoma, legally blind, HTN, orthostatic hypotension, hyperparathyroid, hyperkalemia, LS spinal stenosis, osteoarthritis, osteoporosis, PAD, hx of osteomyelitis of thoracic vertebrae, remote hx of crack-cocaine use disorder, PSH of BKA of L leg.  Presented to ED [facility] from [facility] for trouble breathing and sweating.  Pt reported he was lying in his bed at the facility and felt like he could not move, and felt like he was going to pass out. Next thing pt remembered was waking up in the ED.  Admitted for Hypoglycemia.    In ED, pt noted to have glucose of 36. Rapid IV access was obtained and 2 amp of D50 given, after which pt opened his eyes and asked for food.

## 2023-01-11 NOTE — PROGRESS NOTE ADULT - SUBJECTIVE AND OBJECTIVE BOX
Interval Events:      Allergies    fish (Rash)  liver (Anaphylaxis)  No Known Drug Allergies    Intolerances      Endocrine/Metabolic Medications:  insulin lispro (ADMELOG) corrective regimen sliding scale   SubCutaneous three times a day before meals  insulin lispro (ADMELOG) corrective regimen sliding scale   SubCutaneous at bedtime  levothyroxine 25 MICROGram(s) Oral daily  simvastatin 40 milliGRAM(s) Oral at bedtime      Vital Signs Last 24 Hrs  T(C): 37.1 (11 Jan 2023 05:12), Max: 37.1 (11 Jan 2023 05:12)  T(F): 98.7 (11 Jan 2023 05:12), Max: 98.7 (11 Jan 2023 05:12)  HR: 93 (11 Jan 2023 05:12) (89 - 100)  BP: 147/68 (11 Jan 2023 05:12) (147/68 - 170/81)  BP(mean): --  RR: 16 (11 Jan 2023 05:12) (15 - 17)  SpO2: 95% (11 Jan 2023 05:12) (94% - 95%)    Parameters below as of 11 Jan 2023 05:12  Patient On (Oxygen Delivery Method): room air        Weight (kg): 56.4 (01-11 @ 06:55)    PHYSICAL EXAM  All physical exam findings normal, except those marked:  General:	Alert, active, cooperative, NAD, well hydrated  .		[] Abnormal:  Neck		Normal: supple, no cervical adenopathy, no palpable thyroid  .		[] Abnormal:  Cardiovascular	Normal: regular rate, normal S1, S2, no murmurs  .		[] Abnormal:  Respiratory	Normal: no chest wall deformity, normal respiratory pattern, CTA B/L  .		[] Abnormal:  Abdominal	Normal: soft, ND, NT, bowel sounds present, no masses, no organomegaly  .		[] Abnormal:  		Normal normal genitalia, testes descended, circumcised/uncircumcised  .		Juan Luis stage:			Breast juan luis:  .		Menstrual history:  .		[] Abnormal:  Extremities	Normal: FROM x4  .		[] Abnormal:  Skin		Normal: intact and not indurated, no rash, no acanthosis nigricans  .		[] Abnormal:  Neurologic	Normal: grossly intact  .		[] Abnormal:    LABS                        8.7    2.64  )-----------( 94       ( 11 Jan 2023 06:50 )             26.9                               139    |  102    |  30                  Calcium: 7.9   / iCa: x      (01-11 @ 06:50)    ----------------------------<  94        Magnesium: 2.8                              4.0     |  26     |  12.10            Phosphorous: 2.4        CAPILLARY BLOOD GLUCOSE      POCT Blood Glucose.: 96 mg/dL (11 Jan 2023 07:45)  POCT Blood Glucose.: 123 mg/dL (10 Deep 2023 20:59)  POCT Blood Glucose.: 111 mg/dL (10 Deep 2023 18:02)  POCT Blood Glucose.: 99 mg/dL (10 Deep 2023 16:27)  POCT Blood Glucose.: 108 mg/dL (10 Deep 2023 12:03)        Assesment/plan       Interval Events:  pt in nad    Allergies    fish (Rash)  liver (Anaphylaxis)  No Known Drug Allergies    Intolerances      Endocrine/Metabolic Medications:  insulin lispro (ADMELOG) corrective regimen sliding scale   SubCutaneous three times a day before meals  insulin lispro (ADMELOG) corrective regimen sliding scale   SubCutaneous at bedtime  levothyroxine 25 MICROGram(s) Oral daily  simvastatin 40 milliGRAM(s) Oral at bedtime      Vital Signs Last 24 Hrs  T(C): 37.1 (11 Jan 2023 05:12), Max: 37.1 (11 Jan 2023 05:12)  T(F): 98.7 (11 Jan 2023 05:12), Max: 98.7 (11 Jan 2023 05:12)  HR: 93 (11 Jan 2023 05:12) (89 - 100)  BP: 147/68 (11 Jan 2023 05:12) (147/68 - 170/81)  BP(mean): --  RR: 16 (11 Jan 2023 05:12) (15 - 17)  SpO2: 95% (11 Jan 2023 05:12) (94% - 95%)    Parameters below as of 11 Jan 2023 05:12  Patient On (Oxygen Delivery Method): room air        Weight (kg): 56.4 (01-11 @ 06:55)    PHYSICAL EXAM  All physical exam findings normal, except those marked:  General:	Alert, active, cooperative, NAD, well hydrated  .		[] Abnormal:  Neck		Normal: supple, no cervical adenopathy, no palpable thyroid  .		[] Abnormal:  Cardiovascular	Normal: regular rate, normal S1, S2, no murmurs  .		[] Abnormal:  Respiratory	Normal: no chest wall deformity, normal respiratory pattern, CTA B/L  .		[] Abnormal:  Abdominal	Normal: soft, ND, NT, bowel sounds present, no masses, no organomegaly  .		[] Abnormal:  		Normal normal genitalia, testes descended, circumcised/uncircumcised  .		Juan Luis stage:			Breast juan luis:  .		Menstrual history:  .		[] Abnormal:  Extremities	Normal: FROM x4  .		[] Abnormal:  Skin		Normal: intact and not indurated, no rash, no acanthosis nigricans  .		[] Abnormal:  Neurologic	Normal: grossly intact  .		[] Abnormal:    LABS                        8.7    2.64  )-----------( 94       ( 11 Jan 2023 06:50 )             26.9                               139    |  102    |  30                  Calcium: 7.9   / iCa: x      (01-11 @ 06:50)    ----------------------------<  94        Magnesium: 2.8                              4.0     |  26     |  12.10            Phosphorous: 2.4        CAPILLARY BLOOD GLUCOSE      POCT Blood Glucose.: 96 mg/dL (11 Jan 2023 07:45)  POCT Blood Glucose.: 123 mg/dL (10 Deep 2023 20:59)  POCT Blood Glucose.: 111 mg/dL (10 Deep 2023 18:02)  POCT Blood Glucose.: 99 mg/dL (10 Deep 2023 16:27)  POCT Blood Glucose.: 108 mg/dL (10 Deep 2023 12:03)        Assesment/plan    · Assessment	  Pt is a 61 yo M w PMH ESRD on dialysis (Tues, Thurs, Sat), DM2, CAD s/p MI, CHF, glaucoma, legally blind, HTN, orthostatic hypotension, hyperparathyroid, hyperkalemia, LS spinal stenosis, osteoarthritis, osteoporosis, PAD, hx of osteomyelitis of thoracic vertebrae, remote hx of crack-cocaine use disorder, PSH of BKA of L leg, who was BIBEMS to ED from [facility] for trouble breathing and sweating.   Pt admits to swings in fsg as out pt with occ hypos- "more highs than lows ". Admits to having decent appetite. Does not recall insulin name or doses- per med rec on levemir 4 units        Problem/Recommendation - 1:  ·  Problem: Hypoglycemia.   ·  Recommendation: with swings per pt - resolved  monitor fsg ac and hs  check a1c- 5%  low dose prn admelog for now  would not rec standing doses of insulin  consider tradjenta 5mg if fsg >200  d/w prim team.     Problem/Recommendation - 2:  ·  Problem: Hypothyroid.   ·  Recommendation: on low dose lt4 25mcg  check tsh/ft4.     Problem/Recommendation - 3:  ·  Problem: ESRD on dialysis.   ·  Recommendation: cont HD per nephro.

## 2023-01-11 NOTE — PROGRESS NOTE ADULT - SUBJECTIVE AND OBJECTIVE BOX
Patient is a 62y old  Male who presents with a chief complaint of hypoglycemia (10 Deep 2023 18:00)    PATIENT IS SEEN AND EXAMINED IN MEDICAL FLOOR.     ALLERGIES:  fish (Rash)  liver (Anaphylaxis)  No Known Drug Allergies      Daily     Daily Weight in k.4 (2023 05:12)    VITALS:    Vital Signs Last 24 Hrs  T(C): 37.2 (2023 20:30), Max: 37.2 (2023 13:15)  T(F): 98.9 (2023 20:30), Max: 99 (2023 13:15)  HR: 96 (2023 20:30) (91 - 96)  BP: 154/85 (2023 20:30) (147/68 - 164/84)  BP(mean): --  RR: 18 (2023 20:30) (16 - 18)  SpO2: 96% (2023 20:30) (94% - 100%)    Parameters below as of 2023 20:30  Patient On (Oxygen Delivery Method): room air        LABS:    CBC Full  -  ( 2023 06:50 )  WBC Count : 2.64 K/uL  RBC Count : 2.79 M/uL  Hemoglobin : 8.7 g/dL  Hematocrit : 26.9 %  Platelet Count - Automated : 94 K/uL  Mean Cell Volume : 96.4 fl  Mean Cell Hemoglobin : 31.2 pg  Mean Cell Hemoglobin Concentration : 32.3 gm/dL  Auto Neutrophil # : x  Auto Lymphocyte # : x  Auto Monocyte # : x  Auto Eosinophil # : x  Auto Basophil # : x  Auto Neutrophil % : x  Auto Lymphocyte % : x  Auto Monocyte % : x  Auto Eosinophil % : x  Auto Basophil % : x      11    139  |  102  |  30<H>  ----------------------------<  94  4.0   |  26  |  12.10<H>    Ca    7.9<L>      2023 06:50  Phos  2.4     -11  Mg     2.8         TPro  5.9<L>  /  Alb  2.8<L>  /  TBili  0.5  /  DBili  x   /  AST  10  /  ALT  14  /  AlkPhos  75  01-10    CAPILLARY BLOOD GLUCOSE      POCT Blood Glucose.: 130 mg/dL (2023 21:58)  POCT Blood Glucose.: 150 mg/dL (2023 16:29)  POCT Blood Glucose.: 145 mg/dL (2023 11:33)  POCT Blood Glucose.: 96 mg/dL (2023 07:45)        LIVER FUNCTIONS - ( 10 Deep 2023 05:35 )  Alb: 2.8 g/dL / Pro: 5.9 g/dL / ALK PHOS: 75 U/L / ALT: 14 U/L DA / AST: 10 U/L / GGT: x           Creatinine Trend: 12.10<--, 16.10<--, 16.10<--  I&O's Summary    10 Deep 2023 07:01  -  2023 07:00  --------------------------------------------------------  IN: 0 mL / OUT: 1 mL / NET: -1 mL            .Blood Blood-Peripheral   @ 06:58   No Growth Final  --  --      .Blood Blood-Peripheral   @ 06:48   No Growth Final  --  --          MEDICATIONS:    MEDICATIONS  (STANDING):  albuterol/ipratropium for Nebulization 3 milliLiter(s) Nebulizer every 8 hours  ALPRAZolam 0.25 milliGRAM(s) Oral at bedtime  amLODIPine   Tablet 10 milliGRAM(s) Oral daily  budesonide 160 MICROgram(s)/formoterol 4.5 MICROgram(s) Inhaler 2 Puff(s) Inhalation two times a day  calcium carbonate    500 mG (Tums) Chewable 1 Tablet(s) Chew two times a day  chlorhexidine 2% Cloths 1 Application(s) Topical <User Schedule>  cholecalciferol 1000 Unit(s) Oral daily  cyanocobalamin 1000 MICROGram(s) Oral daily  epoetin beverley-epbx (RETACRIT) Injectable 4000 Unit(s) IV Push <User Schedule>  ferrous    sulfate 325 milliGRAM(s) Oral daily  folic acid 1 milliGRAM(s) Oral daily  furosemide    Tablet 80 milliGRAM(s) Oral daily  heparin   Injectable 5000 Unit(s) SubCutaneous every 8 hours  hydrALAZINE 50 milliGRAM(s) Oral three times a day  insulin lispro (ADMELOG) corrective regimen sliding scale   SubCutaneous three times a day before meals  insulin lispro (ADMELOG) corrective regimen sliding scale   SubCutaneous at bedtime  levothyroxine 25 MICROGram(s) Oral daily  lidocaine   4% Patch 1 Patch Transdermal daily  metoclopramide 10 milliGRAM(s) Oral three times a day  metolazone 10 milliGRAM(s) Oral daily  metoprolol tartrate 12.5 milliGRAM(s) Oral two times a day  mupirocin 2% Ointment 1 Application(s) Topical two times a day  Nephro-luann 1 Tablet(s) Oral daily  nicotine - 21 mG/24Hr(s) Patch 1 Patch Transdermal daily  pantoprazole    Tablet 40 milliGRAM(s) Oral two times a day  simvastatin 40 milliGRAM(s) Oral at bedtime  sodium zirconium cyclosilicate 10 Gram(s) Oral <User Schedule>  sucralfate suspension 1 Gram(s) Oral four times a day      MEDICATIONS  (PRN):  acetaminophen     Tablet .. 650 milliGRAM(s) Oral every 6 hours PRN Temp greater or equal to 38C (100.4F), Mild Pain (1 - 3)  diphenhydrAMINE 25 milliGRAM(s) Oral at bedtime PRN Rash and/or Itching  ondansetron Injectable 4 milliGRAM(s) IV Push every 8 hours PRN Nausea and/or Vomiting  oxyCODONE    IR 5 milliGRAM(s) Oral every 8 hours PRN Severe Pain (7 - 10)  zolpidem 5 milliGRAM(s) Oral at bedtime PRN Insomnia      REVIEW OF SYSTEMS:                           ALL ROS DONE [ X   ]    CONSTITUTIONAL:  LETHARGIC [   ], FEVER [   ], UNRESPONSIVE [   ]  CVS:  CP  [   ], SOB, [   ], PALPITATIONS [   ], DIZZYNESS [   ]  RS: COUGH [   ], SPUTUM [   ]  GI: ABDOMINAL PAIN [   ], NAUSEA [   ], VOMITINGS [   ], DIARRHEA [   ], CONSTIPATION [   ]  :  DYSURIA [   ], NOCTURIA [   ], INCREASED FREQUENCY [   ], DRIBLING [   ],  SKELETAL: PAINFUL JOINTS [   ], SWOLLEN JOINTS [   ], NECK ACHE [   ], LOW BACK ACHE [   ],  SKIN : ULCERS [   ], RASH [   ], ITCHING [   ]  CNS: HEAD ACHE [   ], DOUBLE VISION [   ], BLURRED VISION [   ], AMS / CONFUSION [   ], SEIZURES [   ], WEAKNESS [   ],TINGLING / NUMBNESS [   ]    PHYSICAL EXAMINATION:    GENERAL APPEARANCE: NO DISTRESS  HEENT:  NO PALLOR, NO  JVD,  NO   NODES, NECK SUPPLE  CVS: S1 +, S2 +,   RS: AEEB,  OCCASIONAL  RALES +,   NO RONCHI  ABD: SOFT, NT, NO, BS +  EXT: PE +,  LEFT BKA +  ;   AVF + ? EDEMA OF RIGHT SIDE +  SKIN: WARM,   SKELETAL:  ROM REDUCED AT CERVICAL & LS SPINE  CNS:  AAO X  3  ,   OLD DEFICITS     RADIOLOGY :    RADIOLOGY AND READINGS REVIEWED    ASSESSMENT :     Hypoglycemia    No pertinent past medical history    Hypertension    Adrenal insufficiency    CKD (chronic kidney disease)    Anemia    Glaucoma    Coronary artery disease    HLD (hyperlipidemia)    Peripheral vascular disease    Spinal stenosis of lumbosacral region    Hyperparathyroidism    Diabetes mellitus    Diabetic neuropathy    Contracture of hand    Osteoarthritis    Osteoporosis    Vision loss of left eye    ESRD on hemodialysis    Cataract    BPH (benign prostatic hyperplasia)    UTI (urinary tract infection)    Bladder mass    H/O hematuria    Osteoporosis    Vision loss of right eye    Depression    Chronic GERD    Osteomyelitis of vertebra    CHF (congestive heart failure)    No significant past surgical history    Below knee amputation status, left    History of right cataract extraction    History of left cataract extraction    S/P arteriovenous (AV) fistula creation    H/O hematuria    H/O transurethral destruction of bladder lesion    History of excision of mass        PLAN:  HPI:  Pt is a 61 yo M w PMH ESRD on dialysis (Tues, Thurs, Sat), DM2, CAD s/p MI, CHF, glaucoma, legally blind, HTN, orthostatic hypotension, hyperparathyroid, hyperkalemia, LS spinal stenosis, osteoarthritis, osteoporosis, PAD, hx of osteomyelitis of thoracic vertebrae, remote hx of crack-cocaine use disorder, PSH of BKA of L leg, [facility]. As per pt, he was lying in his bed at the facility and felt lie he could not move, and felt like he was going to pass out. Next thing he remembers is waking up in the ED. As per ED note, pt became acutely confused on way to hospital. In ED, pt noted to have FSG of 36. Rapid IV access was obtained and 2 amp of D50 given, after which pt opened his eyes and asked for food.     Currently, pt endorses feeling a little dizzy and tired, but otherwise feels close to baseline. Denies dyspnea above baseline, chest pain, headache, fever, coughs, chills, n/v/d, (10 Deep 2023 02:20)    # [] CASE DISCUSSED AT LENGTH WITH PATIENT AND BROTHER ARTEMIO YOUNG [@ [phone]].    DISCUSSED WITH PATIENT THAT WITH MULTIPLE MEDICAL COMORBIDITIES THAT ARE PROGRESSIVELY WORSENING, PATIENT'S PROGNOSIS IS POOR. PATIENT AND BROTHER VERBALIZED UNDERSTANDING. IN DISCUSSION REGARDING GOALS OF CARE - PATIENT EXPRESSED HE WISHES TO BE FULL CODE. PATIENT ENCOURAGED BY TEAM AND BY BROTHER TO BE COMPLIANT WITH MEDICAL RECOMMENDATIONS AS RISKS FOR NONCOMPLIANCE INCLUDE BUT ARE NOT LIMITED TO WORSENING PATHOLOGY AND EVEN DEATH. BOTH PATIENT AND BROTHER VERBALIZED UNDERSTANDING.      # HYPOGLYCEMIC EPISODE, LABILE BLOOD GLUCOSE  # SUSPECTED GASTROPARESIS  # UNDERLYING DM  - F/U HBA1C  - SSI + FS  - ENDOCRINOLOGY CONSULT    - PRN ANTIEMETIC, WILL CONSIDER TRIAL OF REGLAN IF PERSISTENT    - PATIENT REPORTS HE INTERMITTENTLY HAS EPISODES OF NONBLOODY EMESIS ; PATIENT MAY NEED CLOSE MONITORING OF FINGERSTICKS AND ONGOING OUTPATIENT EVALUATION AND MANAGEMENT OF GASTROPARESIS    # ACUTE HYPOXIC RESPIRATORY FAILURE S/T PULMONARY EDEMA - IMPROVED AFTER HD  # UNCONTROLLED HTN  # ESRD ON HD TTS - W/ HX OF NONCOMPLIANCE    - HYDRALAZINE, METOPROLOL AND NORVASC  - PLACED ON DECADRON AND LEVAQUIN RECENTLY  - SUPPLEMENTAL O2  - NEPHROLOGY CONSULT    # PATIENT UNDERGOING OUTPATIENT WORKUP FOR CENTRAL VENOUS STENOSIS THROUGH NEPHROLOGY TEAM    # ANEMIA OF CKD  # HX OF PANCYTOPENIA   - TREND HGB, TRANSFUSION THRESHOLD HGB < 7  - TYPE AND SCREEN  - ON EPO  - DENIES RECENT HEMATEMESIS, MELENA, HEMATOCHEZIA    # HISTORY OF ESOPHAGITIS AND DUODENITIS [2021], HX OF GASTROPARESIS W/ EVIDENCE OF THICKENED ESOPHAGUS ON PREVIOUS CT SCAN   - EGD AT [facility] - DEMONSTRATED RETAINED FOOD PRODUCT IN STOMACH, RECOMMENDED FOR GASTRIC EMPTYING STUDY  - DENIES RECENT HEMATEMESIS   - MONITORING HGB, PLACED ON PPI BID  - CARAFATE, PRN ANTIEMETICS  - MONITORING FOR SYMPTOMS    # SEVERE PROTEIN CALORIE MALNUTRITION, FAILURE TO THRIVE - NUTRITIONAL SUPPLEMENT    # HLD  # PARTIALLY BLIND  # S/P LEFT BKA  # HX OF PVD  # LS SPINAL STENOSIS  # GI AND DVT PPX .

## 2023-01-11 NOTE — PROGRESS NOTE ADULT - PROBLEM SELECTOR PLAN 3
H/o HTN on Furosemide, Metolazone, Hydralazine, Amlodipine. & Toprol   - Continue to monitor BP  - C/w Furosemide, Metolazone, Hydralazine, Amlodipine, & Lopressor

## 2023-01-11 NOTE — PROGRESS NOTE ADULT - ASSESSMENT
Patient is a 62y Male whom presented to the hospital with SOB. Admitted in this hospital earlier this week with COVID 19.  He did not complete HD treatment on Thursday. Presented to ED with SOB and bilateral leg edema.  Renal consulted for HD     # ESRD. seen on hemodialysis anjana with central stenosis. will arrange for fistulogram and angioplasty after discharge.  # anemia of CKD. epogen on HD   # CKD MBD. resume sevelamer 800mg three times per day ac  # Hypoglycemia- mx per endocrinology  Patient is a 62y Male whom presented to the hospital with SOB. Admitted in this hospital earlier this week with COVID 19.  He did not complete HD treatment on Thursday. Presented to ED with SOB and bilateral leg edema.  Renal consulted for HD     # ESRD. seen on hemodialysis anjana with central stenosis. will arrange for fistulogram and angioplasty after discharge.  # anemia of CKD. epogen on HD   # Hypoglycemia- mx per endocrinology

## 2023-01-12 NOTE — DISCHARGE NOTE PROVIDER - NSDCMRMEDTOKEN_GEN_ALL_CORE_FT
ALPRAZolam 0.25 mg oral tablet: 1 tab(s) orally once a day (at bedtime)  Ambien 5 mg oral tablet: 1 tab(s) orally once a day (at bedtime)  Bactroban 2% topical ointment: Apply topically to affected area once a day (at bedtime) to R toe and 2nd toe area  Benadryl Allergy 25 mg oral tablet: 1 tab(s) orally once a day  DuoNeb 0.5 mg-2.5 mg/3 mL inhalation solution: 3 milliliter(s) inhaled 3 times a day  ferrous sulfate 325 mg (65 mg elemental iron) oral tablet: 1 tab(s) orally once a day  folic acid 1 mg oral tablet: 1 tab(s) orally once a day  glucagon 1 mg injection: 1 milligram(s) injectable , As Needed  hydrALAZINE 25 mg oral tablet: 1 tab(s) orally 3 times a day  Lasix 80 mg oral tablet: 1 tab(s) orally once a day  Levemir 100 units/mL subcutaneous solution: 4 unit(s) subcutaneous 2 times a day  lidocaine 4% patch: 1 patch transdermal once a day  Lokelma 10 g oral powder for reconstitution: 1 packet(s) orally once a day every Sun/ Mon/ Wed/ Fri  metoprolol succinate 25 mg oral tablet, extended release: 1 tab(s) orally once a day  Norvasc 10 mg oral tablet: 1 tab(s) orally once a day  ondansetron 4 mg oral tablet: 1 tab(s) orally every 8 hours, As Needed  oxyCODONE 5 mg oral tablet: 1 tab(s) orally every 8 hours, As Needed  pantoprazole 40 mg oral delayed release tablet: 1 tab(s) orally 2 times a day  Reglan 10 mg oral tablet: 1 tab(s) orally 3 times a day  Polina-Dior oral tablet: 1 tab(s) orally once a day  sucralfate 1 g/10 mL oral suspension: 10 milliliter(s) orally 4 times a day  Symbicort 160 mcg-4.5 mcg/inh inhalation aerosol: 1 puff(s) inhaled 2 times a day  Synthroid 25 mcg (0.025 mg) oral tablet: 1 tab(s) orally once a day  Tums 500 mg oral tablet, chewable: 1 tab(s) orally 2 times a day  Vitamin B-12 1000 mcg oral tablet: 1 tab(s) orally once a day  Vitamin D3 25 mcg (1000 intl units) oral capsule: 1 cap(s) orally once a day  Zaroxolyn 10 mg oral tablet: 1 tab(s) orally once a day  Zocor 40 mg oral tablet: 1 tab(s) orally once a day (at bedtime)   acetaminophen 325 mg oral tablet: 2 tab(s) orally every 6 hours, As needed, Temp greater or equal to 38C (100.4F), Mild Pain (1 - 3)  ALPRAZolam 0.25 mg oral tablet: 1 tab(s) orally once a day (at bedtime)  amLODIPine 10 mg oral tablet: 1 tab(s) orally once a day  Bactroban 2% topical ointment: Apply topically to affected area once a day (at bedtime) to R toe and 2nd toe area  budesonide-formoterol 160 mcg-4.5 mcg/inh inhalation aerosol: 2 puff(s) inhaled 2 times a day  calcium carbonate 500 mg (200 mg elemental calcium) oral tablet, chewable: 1 tab(s) orally 2 times a day  cholecalciferol oral tablet: 1000 unit(s) orally once a day  cyanocobalamin 1000 mcg oral tablet: 1 tab(s) orally once a day  diphenhydrAMINE 25 mg oral capsule: 1 cap(s) orally once a day (at bedtime), As needed, Rash and/or Itching  epoetin beverley: 4000 unit(s) intravenous Tuesday, Thursday, Saturday  ferrous sulfate 325 mg (65 mg elemental iron) oral tablet: 1 tab(s) orally once a day  folic acid 1 mg oral tablet: 1 tab(s) orally once a day  furosemide 80 mg oral tablet: 1 tab(s) orally once a day  glucagon 1 mg injection: 1 milligram(s) injectable , As Needed  hydrALAZINE 50 mg oral tablet: 1 tab(s) orally 3 times a day  ipratropium-albuterol 0.5 mg-2.5 mg/3 mL inhalation solution: 3 milliliter(s) inhaled every 8 hours  Levemir 100 units/mL subcutaneous solution: 4 unit(s) subcutaneous 2 times a day  levothyroxine 25 mcg (0.025 mg) oral tablet: 1 tab(s) orally once a day  lidocaine 4% patch: 1 patch transdermal once a day  Lokelma 10 g oral powder for reconstitution: 1 packet(s) orally once a day every Sun/ Mon/ Wed/ Fri  metoclopramide 10 mg oral tablet: 1 tab(s) orally 3 times a day  metOLazone 10 mg oral tablet: 1 tab(s) orally once a day  metoprolol succinate 25 mg oral tablet, extended release: 1 tab(s) orally once a day  nicotine 21 mg/24 hr transdermal film, extended release: 1 patch transdermal once a day  ondansetron 2 mg/mL injectable solution: 4 milligram(s) injectable every 8 hours, As Neededfor nausea and/or vomiting   oxyCODONE 5 mg oral tablet: 1 tab(s) orally every 8 hours, As needed, Severe Pain (7 - 10)  pantoprazole 40 mg oral delayed release tablet: 1 tab(s) orally 2 times a day  Polina-Dior oral tablet: 1 tab(s) orally once a day  simvastatin 40 mg oral tablet: 1 tab(s) orally once a day (at bedtime)  sucralfate 1 g/10 mL oral suspension: 10 milliliter(s) orally 4 times a day  zolpidem 5 mg oral tablet: 1 tab(s) orally once a day (at bedtime), As needed, Insomnia   acetaminophen 325 mg oral tablet: 2 tab(s) orally every 6 hours, As needed, Temp greater or equal to 38C (100.4F), Mild Pain (1 - 3)  ALPRAZolam 0.25 mg oral tablet: 1 tab(s) orally once a day (at bedtime)  amLODIPine 10 mg oral tablet: 1 tab(s) orally once a day  budesonide-formoterol 160 mcg-4.5 mcg/inh inhalation aerosol: 2 puff(s) inhaled 2 times a day  calcium carbonate 500 mg (200 mg elemental calcium) oral tablet, chewable: 1 tab(s) orally 2 times a day  cholecalciferol oral tablet: 1000 unit(s) orally once a day  cyanocobalamin 1000 mcg oral tablet: 1 tab(s) orally once a day  diphenhydrAMINE 25 mg oral capsule: 1 cap(s) orally once a day (at bedtime), As needed, Rash and/or Itching  epoetin beverley: 4000 unit(s) intravenous Tuesday, Thursday, Saturday  ferrous sulfate 325 mg (65 mg elemental iron) oral tablet: 1 tab(s) orally once a day  folic acid 1 mg oral tablet: 1 tab(s) orally once a day  furosemide 80 mg oral tablet: 1 tab(s) orally once a day  hydrALAZINE 25 mg oral tablet: 1 tab(s) orally 3 times a day  ipratropium-albuterol 0.5 mg-2.5 mg/3 mL inhalation solution: 3 milliliter(s) inhaled every 8 hours  levothyroxine 25 mcg (0.025 mg) oral tablet: 1 tab(s) orally once a day  lidocaine 4% patch: 1 patch transdermal once a day  Lokelma 10 g oral powder for reconstitution: 1 packet(s) orally once a day every Sun/ Mon/ Wed/ Fri  metoclopramide 10 mg oral tablet: 1 tab(s) orally 3 times a day  metOLazone 10 mg oral tablet: 1 tab(s) orally once a day  metoprolol succinate 25 mg oral tablet, extended release: 1 tab(s) orally once a day  nicotine 21 mg/24 hr transdermal film, extended release: 1 patch transdermal once a day  ondansetron 2 mg/mL injectable solution: 4 milligram(s) injectable every 8 hours, As Neededfor nausea and/or vomiting   oxyCODONE 5 mg oral tablet: 1 tab(s) orally every 8 hours, As needed, Severe Pain (7 - 10)  pantoprazole 40 mg oral delayed release tablet: 1 tab(s) orally 2 times a day  Polina-Dior oral tablet: 1 tab(s) orally once a day  simvastatin 40 mg oral tablet: 1 tab(s) orally once a day (at bedtime)  sucralfate 1 g/10 mL oral suspension: 10 milliliter(s) orally 4 times a day  zolpidem 5 mg oral tablet: 1 tab(s) orally once a day (at bedtime), As needed, Insomnia   acetaminophen 325 mg oral tablet: 2 tab(s) orally every 6 hours, As needed, Temp greater or equal to 38C (100.4F), Mild Pain (1 - 3)  ALPRAZolam 0.25 mg oral tablet: 1 tab(s) orally once a day (at bedtime)  amLODIPine 10 mg oral tablet: 1 tab(s) orally once a day  budesonide-formoterol 160 mcg-4.5 mcg/inh inhalation aerosol: 2 puff(s) inhaled 2 times a day  calcium carbonate 500 mg (200 mg elemental calcium) oral tablet, chewable: 1 tab(s) orally 2 times a day  cholecalciferol oral tablet: 1000 unit(s) orally once a day  cyanocobalamin 1000 mcg oral tablet: 1 tab(s) orally once a day  diphenhydrAMINE 25 mg oral capsule: 1 cap(s) orally once a day (at bedtime), As needed, Rash and/or Itching  epoetin beverley: 4000 unit(s) intravenous Tuesday, Thursday, Saturday  ferrous sulfate 325 mg (65 mg elemental iron) oral tablet: 1 tab(s) orally once a day  folic acid 1 mg oral tablet: 1 tab(s) orally once a day  furosemide 80 mg oral tablet: 1 tab(s) orally once a day  hydrALAZINE 25 mg oral tablet: 1 tab(s) orally 3 times a day  insulin lispro 100 units/mL injectable solution: 1 unit(s) injectable 3 times a day (before meals)  1 Unit(s) if Glucose 151 - 200  2 Unit(s) if Glucose 201 - 250  3 Unit(s) if Glucose 251 - 300  4 Unit(s) if Glucose 301 - 350  5 Unit(s) if Glucose 351 - 400  6 Unit(s) if Glucose Greater Than 400  Subcutaneous three times a day before meals     insulin lispro 100 units/mL injectable solution: 0 unit(s) injectable once a day (at bedtime)  0 Unit(s) if Glucose 0 - 250  1 Unit(s) if Glucose 251 - 300  2 Unit(s) if Glucose 301 - 350  3 Unit(s) if Glucose 351 - 400  4 Unit(s) if Glucose Greater Than 400  Subcutaneous at bedtime     ipratropium-albuterol 0.5 mg-2.5 mg/3 mL inhalation solution: 3 milliliter(s) inhaled every 8 hours  levothyroxine 25 mcg (0.025 mg) oral tablet: 1 tab(s) orally once a day  lidocaine 4% patch: 1 patch transdermal once a day  Lokelma 10 g oral powder for reconstitution: 1 packet(s) orally once a day every Sun/ Mon/ Wed/ Fri  metoclopramide 10 mg oral tablet: 1 tab(s) orally 3 times a day  metOLazone 10 mg oral tablet: 1 tab(s) orally once a day  metoprolol succinate 25 mg oral tablet, extended release: 1 tab(s) orally once a day  nicotine 21 mg/24 hr transdermal film, extended release: 1 patch transdermal once a day  ondansetron 2 mg/mL injectable solution: 4 milligram(s) injectable every 8 hours, As Neededfor nausea and/or vomiting   oxyCODONE 5 mg oral tablet: 1 tab(s) orally every 8 hours, As needed, Severe Pain (7 - 10)  pantoprazole 40 mg oral delayed release tablet: 1 tab(s) orally 2 times a day  Polina-Dior oral tablet: 1 tab(s) orally once a day  simvastatin 40 mg oral tablet: 1 tab(s) orally once a day (at bedtime)  sucralfate 1 g/10 mL oral suspension: 10 milliliter(s) orally 4 times a day  zolpidem 5 mg oral tablet: 1 tab(s) orally once a day (at bedtime), As needed, Insomnia

## 2023-01-12 NOTE — ED PROVIDER NOTE - NS ED MD DISPO DIVISION
She should have CBC, CMP if she's taking this twice daily every day.  Also should have follow up or see PCP if she wants to get it through them   [facility]

## 2023-01-12 NOTE — DISCHARGE NOTE PROVIDER - NSDCCPCAREPLAN_GEN_ALL_CORE_FT
PRINCIPAL DISCHARGE DIAGNOSIS  Diagnosis: Hypoglycemia  Assessment and Plan of Treatment: You presented to the hospital with hypoglycemia.  While in the hospital you were seen by an Endocrinologist. Your insulin regimen was adjusted to control your blood sugar and prevent hypoglycemia.  Please continue your insulin regimen as instructed         SECONDARY DISCHARGE DIAGNOSES  Diagnosis: ESRD on dialysis  Assessment and Plan of Treatment: You have a history of ESRD for which you receive dialysis.  While in the hospital you continued to receive dialysis.  Your last dialysis was on Thur., 1/12.  Please resume your out patient dialysis.    Diagnosis: Chronic heart failure with preserved ejection fraction  Assessment and Plan of Treatment: You have a history of heart failure for which you take Furosemide, Metolazone, Hydralazine, Amlodipine, & Toprol.  Please continue taking these medications as prescribed.    Diagnosis: Hypertension  Assessment and Plan of Treatment: You have a history of high blood pressure for which you take Furosemide, Metolazone, Hydralazine, Amlodipine, & Toprol.  Please continue taking these medications as prescribed.    Diagnosis: HLD (hyperlipidemia)  Assessment and Plan of Treatment: You have a history of hyperlipidemia.  Hyperlipidemia is a condition in which your blood has too many fat particles.  Hyperlipidemia can be caused by drinking a lot of alcohol, eating a lot of foods with trans or saturated fats, and smoking.  Hyperlipidemia affects your vessels in your body and can lead to atherosclerosis.  Hyperlipidemia is diagnosed by blood tests.  You have been prescribed medication to treat your hyperlipidemia.  Please take your medication as prescribed      Diagnosis: Bronchitis due to tobacco use  Assessment and Plan of Treatment: You have a history of bronchitis.  Please continue taking your medication as prescribed.    Diagnosis: Hypothyroid  Assessment and Plan of Treatment: You have a history of hypothyroid for which you take Synthroid.  Please continue taking your medication as prescribed.      Diagnosis: Osteoarthritis  Assessment and Plan of Treatment: You have a history of osteoarthritis.  Please continue your pain regimen.

## 2023-01-12 NOTE — DISCHARGE NOTE NURSING/CASE MANAGEMENT/SOCIAL WORK - PATIENT PORTAL LINK FT
You can access the FollowMyHealth Patient Portal offered by [facility] by registering at the following website: [email or web address]. By joining [facility]’s FollowMyHealth portal, you will also be able to view your health information using other applications (apps) compatible with our system.

## 2023-01-12 NOTE — DISCHARGE NOTE PROVIDER - CARE PROVIDER_API CALL
Ambrocio Mason)  Vascular Surgery  [address], [address]  [place] [address]  Phone: [phone]  Fax: [phone]  Follow Up Time:

## 2023-01-12 NOTE — DISCHARGE NOTE NURSING/CASE MANAGEMENT/SOCIAL WORK - NSDCPEFALRISK_GEN_ALL_CORE
For information on Fall & Injury Prevention, visit: [email or web address] OR  [email or web address] OR  https://www.cdc.gov/steadi/patient.html

## 2023-01-12 NOTE — DISCHARGE NOTE PROVIDER - HOSPITAL COURSE
62 year old, Male, w/ PMH of ESRD on dialysis (Tues, Thyolanda, Sat), DM2, CAD s/p MI, CHF, glaucoma, legally blind, HTN, orthostatic hypotension, hyperparathyroid, hyperkalemia, LS spinal stenosis, osteoarthritis, osteoporosis, PAD, hx of osteomyelitis of thoracic vertebrae, remote hx of crack-cocaine use disorder, PSH of BKA of L leg.  Presented to ED [facility] from [facility] for trouble breathing and sweating.  Patient reported he was lying in his bed at the facility and felt like he could not move, and felt like he was going to pass out.  Next thing patient remembered was waking up in the ED.  Admitted for Hypoglycemia.    In ED, patient noted to have glucose of 36.  Rapid IV access was obtained and 2 amp's of D50 given, after which patient opened his eyes and asked for food.     THIS IS A BRIEF SUMMARY.  FOR A FULL HOSPITAL COURSE SEE CHART

## 2023-01-12 NOTE — DISCHARGE NOTE PROVIDER - NSDCFUSCHEDAPPT_GEN_ALL_CORE_FT
[facility]  VASCULAR [address]  Scheduled Appointment: 02/13/2023    Ambrocio Mason  [facility]  VASCULAR [address]  Scheduled Appointment: 02/13/2023    [facility]  GASTRO OP [id number]  Scheduled Appointment: 03/23/2023

## 2023-01-12 NOTE — PROGRESS NOTE ADULT - SUBJECTIVE AND OBJECTIVE BOX
Interval Events:      Allergies    fish (Rash)  liver (Anaphylaxis)  No Known Drug Allergies    Intolerances      Endocrine/Metabolic Medications:  insulin lispro (ADMELOG) corrective regimen sliding scale   SubCutaneous three times a day before meals  insulin lispro (ADMELOG) corrective regimen sliding scale   SubCutaneous at bedtime  levothyroxine 25 MICROGram(s) Oral daily  simvastatin 40 milliGRAM(s) Oral at bedtime      Vital Signs Last 24 Hrs  T(C): 36.9 (12 Jan 2023 09:04), Max: 37.2 (11 Jan 2023 13:15)  T(F): 98.5 (12 Jan 2023 09:04), Max: 99 (11 Jan 2023 13:15)  HR: 85 (12 Jan 2023 09:04) (85 - 96)  BP: 140/71 (12 Jan 2023 09:04) (140/71 - 163/77)  BP(mean): 88 (12 Jan 2023 09:04) (88 - 88)  RR: 17 (12 Jan 2023 09:04) (17 - 18)  SpO2: 94% (12 Jan 2023 09:04) (94% - 100%)    Parameters below as of 12 Jan 2023 09:04  Patient On (Oxygen Delivery Method): room air          PHYSICAL EXAM  All physical exam findings normal, except those marked:  General:	Alert, active, cooperative, NAD, well hydrated  .		[] Abnormal:  Neck		Normal: supple, no cervical adenopathy, no palpable thyroid  .		[] Abnormal:  Cardiovascular	Normal: regular rate, normal S1, S2, no murmurs  .		[] Abnormal:  Respiratory	Normal: no chest wall deformity, normal respiratory pattern, CTA B/L  .		[] Abnormal:  Abdominal	Normal: soft, ND, NT, bowel sounds present, no masses, no organomegaly  .		[] Abnormal:  		Normal normal genitalia, testes descended, circumcised/uncircumcised  .		Juan Luis stage:			Breast juan luis:  .		Menstrual history:  .		[] Abnormal:  Extremities	Normal: FROM x4  .		[] Abnormal:  Skin		Normal: intact and not indurated, no rash, no acanthosis nigricans  .		[] Abnormal:  Neurologic	Normal: grossly intact  .		[] Abnormal:    LABS                        9.2    3.20  )-----------( 79       ( 12 Jan 2023 06:42 )             29.1                               140    |  101    |  38                  Calcium: 8.2   / iCa: x      (01-12 @ 06:42)    ----------------------------<  125       Magnesium: x                                4.2     |  26     |  14.40            Phosphorous: x          CAPILLARY BLOOD GLUCOSE      POCT Blood Glucose.: 117 mg/dL (12 Jan 2023 07:38)  POCT Blood Glucose.: 130 mg/dL (11 Jan 2023 21:58)  POCT Blood Glucose.: 150 mg/dL (11 Jan 2023 16:29)  POCT Blood Glucose.: 145 mg/dL (11 Jan 2023 11:33)        Assesment/plan       Interval Events:  pt in nad    Allergies    fish (Rash)  liver (Anaphylaxis)  No Known Drug Allergies    Intolerances      Endocrine/Metabolic Medications:  insulin lispro (ADMELOG) corrective regimen sliding scale   SubCutaneous three times a day before meals  insulin lispro (ADMELOG) corrective regimen sliding scale   SubCutaneous at bedtime  levothyroxine 25 MICROGram(s) Oral daily  simvastatin 40 milliGRAM(s) Oral at bedtime      Vital Signs Last 24 Hrs  T(C): 36.9 (12 Jan 2023 09:04), Max: 37.2 (11 Jan 2023 13:15)  T(F): 98.5 (12 Jan 2023 09:04), Max: 99 (11 Jan 2023 13:15)  HR: 85 (12 Jan 2023 09:04) (85 - 96)  BP: 140/71 (12 Jan 2023 09:04) (140/71 - 163/77)  BP(mean): 88 (12 Jan 2023 09:04) (88 - 88)  RR: 17 (12 Jan 2023 09:04) (17 - 18)  SpO2: 94% (12 Jan 2023 09:04) (94% - 100%)    Parameters below as of 12 Jan 2023 09:04  Patient On (Oxygen Delivery Method): room air          PHYSICAL EXAM  All physical exam findings normal, except those marked:  General:	Alert, active, cooperative, NAD, well hydrated  .		[] Abnormal:  Neck		Normal: supple, no cervical adenopathy, no palpable thyroid  .		[] Abnormal:  Cardiovascular	Normal: regular rate, normal S1, S2, no murmurs  .		[] Abnormal:  Respiratory	Normal: no chest wall deformity, normal respiratory pattern, CTA B/L  .		[] Abnormal:  Abdominal	Normal: soft, ND, NT, bowel sounds present, no masses, no organomegaly  .		[] Abnormal:  		Normal normal genitalia, testes descended, circumcised/uncircumcised  .		Juan Luis stage:			Breast juan luis:  .		Menstrual history:  .		[] Abnormal:  Extremities	Normal: FROM x4  .		[] Abnormal:  Skin		Normal: intact and not indurated, no rash, no acanthosis nigricans  .		[] Abnormal:  Neurologic	Normal: grossly intact  .		[] Abnormal:    LABS                        9.2    3.20  )-----------( 79       ( 12 Jan 2023 06:42 )             29.1                               140    |  101    |  38                  Calcium: 8.2   / iCa: x      (01-12 @ 06:42)    ----------------------------<  125       Magnesium: x                                4.2     |  26     |  14.40            Phosphorous: x          CAPILLARY BLOOD GLUCOSE      POCT Blood Glucose.: 117 mg/dL (12 Jan 2023 07:38)  POCT Blood Glucose.: 130 mg/dL (11 Jan 2023 21:58)  POCT Blood Glucose.: 150 mg/dL (11 Jan 2023 16:29)  POCT Blood Glucose.: 145 mg/dL (11 Jan 2023 11:33)        Assesment/plan    Pt is a 63 yo M w PMH ESRD on dialysis (Tues, Thurs, Sat), DM2, CAD s/p MI, CHF, glaucoma, legally blind, HTN, orthostatic hypotension, hyperparathyroid, hyperkalemia, LS spinal stenosis, osteoarthritis, osteoporosis, PAD, hx of osteomyelitis of thoracic vertebrae, remote hx of crack-cocaine use disorder, PSH of BKA of L leg, who was BIBEMS to ED from [facility] for trouble breathing and sweating.   Pt admits to swings in fsg as out pt with occ hypos- "more highs than lows ". Admits to having decent appetite. Does not recall insulin name or doses- per med rec on levemir 4 units        Problem/Recommendation - 1:  ·  Problem: Hypoglycemia.   ·  Recommendation: with swings per pt - resolved  monitor fsg ac and hs- good control  check a1c- 5%  low dose prn admelog for now  would not rec standing doses of insulin  consider tradjenta 5mg if fsg >200  d/w prim team.     Problem/Recommendation - 2:  ·  Problem: Hypothyroid.   ·  Recommendation: on low dose lt4 25mcg  check tsh/ft4.     Problem/Recommendation - 3:  ·  Problem: ESRD on dialysis.   ·  Recommendation: cont HD per nephro.

## 2023-02-18 NOTE — DISCHARGE NOTE ADULT - PRINCIPAL DIAGNOSIS
Patient is a 9mo F with was admitted to the PICU for management of acute hypoxic respiratory failure secondary to bronchiolitis in the setting of hMPV. Patient was initially placed on HFNC and has tolerated wean to RA as of 6am. Appears very comfortable. Tolerating PO at baseline with appropriate voids. Patient is cleared to be downgraded to floor status with continued management by floor team.
Hypoglycemia

## 2023-02-21 NOTE — H&P ADULT - ATTENDING COMMENTS
# RECURRENT INTRACTABLE NAUSEA/VOMITING  # ? DOUBLE DUCT SIGN, PANCREATIC HEAD  # HISTORY OF ESOPHAGITIS AND DUODENITIS [1/2021], HX OF GASTROPARESIS W/ EVIDENCE OF THICKENED ESOPHAGUS ON PREVIOUS CT SCAN   - EGD AT [facility] - DEMONSTRATED RETAINED FOOD PRODUCT IN STOMACH, RECOMMENDED FOR GASTRIC EMPTYING STUDY  - DENIES RECENT HEMATEMESIS   - MONITORING HGB, PLACED ON PPI BID  - CARAFATE, PRN ANTIEMETICS  - MONITORING FOR SYMPTOMS    - NOTED CT A/P  - MRCP ORDERED  - NPO   - GI CONSULT - DR. PONCE      # HYPOGLYCEMIC EPISODE, LABILE BLOOD GLUCOSE  # SUSPECTED GASTROPARESIS  # UNDERLYING DM  - F/U HBA1C  - SSI + FS  - ENDOCRINOLOGY CONSULT    - PRN ANTIEMETIC, WILL CONSIDER TRIAL OF REGLAN IF PERSISTENT    - PATIENT REPORTS HE INTERMITTENTLY HAS EPISODES OF NONBLOODY EMESIS ; PATIENT MAY NEED CLOSE MONITORING OF FINGERSTICKS AND ONGOING OUTPATIENT EVALUATION AND MANAGEMENT OF GASTROPARESIS    # ACUTE HYPOXIC RESPIRATORY FAILURE S/T ? PULMONARY EDEMA   # UNCONTROLLED HTN  # ESRD ON HD TTS - W/ HX OF NONCOMPLIANCE    - HYDRALAZINE, METOPROLOL AND NORVASC  - PLACED ON DECADRON AND LEVAQUIN RECENTLY  - SUPPLEMENTAL O2  - NEPHROLOGY CONSULT    # PATIENT UNDERGOING OUTPATIENT WORKUP FOR CENTRAL VENOUS STENOSIS THROUGH NEPHROLOGY TEAM    # ANEMIA OF CKD  # HX OF PANCYTOPENIA   - TREND HGB, TRANSFUSION THRESHOLD HGB < 7  - TYPE AND SCREEN  - ON EPO  - DENIES RECENT HEMATEMESIS, MELENA, HEMATOCHEZIA    # SEVERE PROTEIN CALORIE MALNUTRITION, FAILURE TO THRIVE - NUTRITIONAL SUPPLEMENT    # HLD  # PARTIALLY BLIND  # S/P LEFT BKA  # HX OF PVD  # LS SPINAL STENOSIS  # GI AND DVT PPX # CASE D/W PATIENT AND WITH PATIENT'S BROTHER ARTEMIO YOUNG @ [phone] - CASE DISCUSSED AT LENGTH, ALL QUESTIONS ANSWERED. DISCUSSED THAT HE HAS MULTIPLE COMORBIDITIES WITH HIGH MORBIDITY AND MORTALITY RISK. PATIENT IS NONCOMPLIANT WITH RECOMMENDATIONS, EVALUATIONS AND INTERVENTIONS DESPITE ROUTINE COUNSELLING, D/W PATIENT THAT THIS INCREASES HIS RISK OF WORSENING CLINICAL CONDITION AND DEATH. PATIENT AND BROTHER VERBALIZED UNDERSTANDING.    # RECURRENT INTRACTABLE NAUSEA/VOMITING  # ? DOUBLE DUCT SIGN, PANCREATIC HEAD  # HISTORY OF ESOPHAGITIS AND DUODENITIS [1/2021], HX OF GASTROPARESIS W/ EVIDENCE OF THICKENED ESOPHAGUS ON PREVIOUS CT SCAN   - EGD AT [facility] - DEMONSTRATED RETAINED FOOD PRODUCT IN STOMACH, RECOMMENDED FOR GASTRIC EMPTYING STUDY  - DENIES RECENT HEMATEMESIS   - MONITORING HGB, PLACED ON PPI BID  - CARAFATE, PRN ANTIEMETICS  - MONITORING FOR SYMPTOMS    - NOTED CT A/P  - NPO   - F/U TUMOR MARKERS  - F/U MRCP   - GI CONSULT - DR. PONCE      # HYPOGLYCEMIC EPISODE, LABILE BLOOD GLUCOSE  # SUSPECTED GASTROPARESIS  # UNDERLYING DM  - F/U HBA1C  - SSI + FS  - ENDOCRINOLOGY CONSULT    - PRN ANTIEMETIC, WILL CONSIDER TRIAL OF REGLAN IF PERSISTENT    - PATIENT REPORTS HE INTERMITTENTLY HAS EPISODES OF NONBLOODY EMESIS ; PATIENT MAY NEED CLOSE MONITORING OF FINGERSTICKS AND ONGOING OUTPATIENT EVALUATION AND MANAGEMENT OF GASTROPARESIS    # ACUTE HYPOXIC RESPIRATORY FAILURE S/T ? PULMONARY EDEMA   # UNCONTROLLED HTN  # ESRD ON HD TTS - W/ HX OF NONCOMPLIANCE    - HYDRALAZINE, METOPROLOL AND NORVASC  - PLACED ON DECADRON AND LEVAQUIN RECENTLY  - SUPPLEMENTAL O2  - NEPHROLOGY CONSULT    # PATIENT UNDERGOING OUTPATIENT WORKUP FOR CENTRAL VENOUS STENOSIS THROUGH NEPHROLOGY TEAM    # ANEMIA OF CKD  # HX OF PANCYTOPENIA   - TREND HGB, TRANSFUSION THRESHOLD HGB < 7  - TYPE AND SCREEN  - ON EPO  - DENIES RECENT HEMATEMESIS, MELENA, HEMATOCHEZIA    # SEVERE PROTEIN CALORIE MALNUTRITION, FAILURE TO THRIVE - NUTRITIONAL SUPPLEMENT    # HLD  # PARTIALLY BLIND  # S/P LEFT BKA  # HX OF PVD  # LS SPINAL STENOSIS  # GI AND DVT PPX

## 2023-02-21 NOTE — ED PROVIDER NOTE - OBJECTIVE STATEMENT
62 y.o presenting with nausea, vomiting and abd pain. denies cp, sob, diarrhea, dysuria. patient was noted to be hypoxia at nh.

## 2023-02-21 NOTE — H&P ADULT - PROBLEM SELECTOR PLAN 3
pt has history of ESRD on HD TTS    - Nephro consulted, Dr. Mosher  - c/w HD as per schedule  - wean oxygen as tolerated

## 2023-02-21 NOTE — ED ADULT NURSE REASSESSMENT NOTE - NS ED NURSE REASSESS COMMENT FT1
6 am pt refusing to put tele box on him , refusing to take blood for  troponin . MADELEINE SOUZA  made aware

## 2023-02-21 NOTE — ED ADULT NURSE REASSESSMENT NOTE - NS ED NURSE REASSESS COMMENT FT1
710  am pt very  unco-operative , irritable , loud ,not letting anybody do the treatments , blood draw , continuos cardiac monitoring .  made aware . report given to day shift RN

## 2023-02-21 NOTE — H&P ADULT - HISTORY OF PRESENT ILLNESS
ED Course:   Vitals /85 P 101 T98.8F R 18 SpO2 100% NRB 9 L > 98% RA  Meds: albuterol neb, IV lopressor 5 mg  EKG:  62 year old, Male, w/ PMH of ESRD on dialysis (Tues, Thurs, Sat), DM2, CAD s/p MI, CHF, glaucoma, legally blind, HTN, orthostatic hypotension, hyperparathyroid, hyperkalemia, LS spinal stenosis, osteoarthritis, osteoporosis, PAD, hx of osteomyelitis of thoracic vertebrae, remote hx of crack-cocaine use disorder, PSH of BKA of L leg presents with shortness of breath and nausea. Patient states he has been feeling nauseous and short of breath since yesterday. Reports he has been having intermittent nausea for a few weeks but has gotten worse. Denies any chest pain, palpitaitons, fever, chills, n/v/d, urinary or bowel symptoms. Denies dizziness, weight loss, night sweats, unintentional weight loss.    ED Course:   Vitals /85 P 101 T98.8F R 18 SpO2 100% NRB 8L  EKG:  62 year old, Male, w/ PMH of ESRD on dialysis (Tues, Thurs, Sat), DM2, CAD s/p MI, CHF, glaucoma, legally blind, HTN, orthostatic hypotension, hyperparathyroid, hyperkalemia, LS spinal stenosis, osteoarthritis, osteoporosis, PAD, hx of osteomyelitis of thoracic vertebrae, remote hx of crack-cocaine use disorder, PSH of BKA of L leg presents with shortness of breath and nausea. Patient states he has been feeling nauseous and short of breath since yesterday. Reports he has been having intermittent nausea for a few weeks but has gotten worse. Denies any chest pain, palpitaitons, fever, chills, n/v/d, urinary or bowel symptoms. Denies dizziness, weight loss, night sweats, unintentional weight loss.    ED Course:   Vitals /85 P 101 T98.8F R 18 SpO2 100% NRB 8L  Meds: none

## 2023-02-21 NOTE — H&P ADULT - PROBLEM SELECTOR PLAN 2
No pt has h/o CHF now with acute hypoxic respiratory failure  takes lasix and metolazone    - c/w home meds  - wean oxygen as tolerated  - telemonitoring  - daily weights, strict I&O

## 2023-02-21 NOTE — ED PROVIDER NOTE - CLINICAL SUMMARY MEDICAL DECISION MAKING FREE TEXT BOX
Patient presenting with abd pain with nausea and vomiting. will obtain lab, ct, r.o surgical abd. patient also noting hypoxia. will obtain lab, cxr, assess for acs, fluid overload, ed obs and reassess

## 2023-02-21 NOTE — PATIENT PROFILE ADULT - FALL HARM RISK - HARM RISK INTERVENTIONS

## 2023-02-21 NOTE — H&P ADULT - NSHPPHYSICALEXAM_GEN_ALL_CORE
PHYSICAL EXAM:  GENERAL: NAD, lying in bed comfortably  HEAD:  Atraumatic, Normocephalic  EYES: EOMI, PERRLA, conjunctiva and sclera clear, L cataract  ENT: Moist mucous membranes  NECK: Supple, No JVD  CHEST/LUNG: Clear to auscultation bilaterally  HEART: Regular rate and rhythm; No murmurs, rubs, or gallops  ABDOMEN: Bowel sounds present; Soft, Nontender, Nondistended. No hepatomegally  EXTREMITIES:  2+ Peripheral Pulses, brisk capillary refill. No clubbing, cyanosis, or edema. s/p L BKA  NERVOUS SYSTEM:  Alert & Oriented X3, speech clear. No deficits   MSK: FROM all 4 extremities, full and equal strength  SKIN: No rashes or lesions

## 2023-02-21 NOTE — H&P ADULT - ASSESSMENT
62 year old, Male, w/ PMH of ESRD on dialysis (Tues, Thurs, Sat), DM2, CAD s/p MI, CHF, glaucoma, legally blind, HTN, orthostatic hypotension, hyperparathyroid, hyperkalemia, LS spinal stenosis, osteoarthritis, osteoporosis, PAD, hx of osteomyelitis of thoracic vertebrae, remote hx of crack-cocaine use disorder, PSH of BKA of L leg presents with shortness of breath and nausea admitted for c/o pancreatic head cancer

## 2023-02-21 NOTE — ED ADULT NURSE NOTE - OBJECTIVE STATEMENT
the patient  is a 62 y  male complaining of vomiting and SOB the patient  is a 62 y  male complaining of vomiting and SOB. pt is ESRD .on HD ,T,TH ,SAT schedule .  right arm AV fistula intact bruit and thrill present the patient  is a 62 y  male complaining of vomiting and SOB. pt is ESRD .on HD ,T,TH ,SAT schedule .  right arm AV fistula intact bruit and thrill present. pt has BKA with artificial limb in place

## 2023-02-21 NOTE — CONSULT NOTE ADULT - SUBJECTIVE AND OBJECTIVE BOX
[facility] : Progress Note :: [phone], (office [phone]),   Dr Mosher / Dr Washington / Dr Young / Dr Doll / Dr Varsha TURCIOS / Dr Tello / Dr Garcia / Dr Larry qiu  _____________________________________________________________________________________________  Patient is a 62y Male whom presented to the hospital with shortness of breath nausea and vomitting.  Has ESRD on HD TTS at [facility] non-compliant with HD schedule and frequently cuts HD time.  In ED CXR with bilateral infiltrates.  HD arranged earlier in the day.   on had 1.5 hrs of HD insisted to be rinsed off despite being advised to complete HD treatment.    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      Osteoarthritis      Vision loss of left eye  blind      ESRD on hemodialysis  T/Th/S      Cataract  both eyes - hx of sx done      BPH (benign prostatic hyperplasia)      UTI (urinary tract infection)  hx of      Bladder mass  hx of      H/O hematuria      Osteoporosis      Vision loss of right eye  decreased      Depression      Chronic GERD      Osteomyelitis of vertebra      CHF (congestive heart failure)      Below knee amputation status, left  2012- pt is wearing prostesis      History of right cataract extraction      History of left cataract extraction      S/P arteriovenous (AV) fistula creation  right arm brachiocephalic arteriovenous fistula on 11/08/2018      H/O hematuria  s/p bladder bx and fulguration 2/25/2020      H/O transurethral destruction of bladder lesion  2020      History of excision of mass  back mass on 03/31/2021        fish (Rash)  liver (Anaphylaxis)  No Known Drug Allergies    Home Medications Reviewed  Hospital Medications:   MEDICATIONS  (STANDING):  albuterol    0.083%. 2.5 milliGRAM(s) Nebulizer once  albuterol    90 MICROgram(s) HFA Inhaler 2 Puff(s) Inhalation every 6 hours  amLODIPine   Tablet 10 milliGRAM(s) Oral daily  budesonide 160 MICROgram(s)/formoterol 4.5 MICROgram(s) Inhaler 2 Puff(s) Inhalation two times a day  cyanocobalamin 1000 MICROGram(s) Oral daily  epoetin beverley-epbx (RETACRIT) Injectable 21268 Unit(s) IV Push <User Schedule>  ferrous    sulfate 325 milliGRAM(s) Oral daily  folic acid 1 milliGRAM(s) Oral daily  furosemide    Tablet 80 milliGRAM(s) Oral daily  heparin   Injectable 5000 Unit(s) SubCutaneous every 8 hours  hydrALAZINE 25 milliGRAM(s) Oral every 8 hours  insulin lispro (ADMELOG) corrective regimen sliding scale   SubCutaneous three times a day before meals  insulin lispro (ADMELOG) corrective regimen sliding scale   SubCutaneous at bedtime  levothyroxine 25 MICROGram(s) Oral daily  lidocaine   4% Patch 1 Patch Transdermal daily  metolazone 10 milliGRAM(s) Oral daily  metoprolol succinate ER 25 milliGRAM(s) Oral daily  ondansetron Injectable 4 milliGRAM(s) IV Push once  pantoprazole  Injectable 40 milliGRAM(s) IV Push every 12 hours  simvastatin 40 milliGRAM(s) Oral at bedtime  sodium zirconium cyclosilicate 10 Gram(s) Oral <User Schedule>  sucralfate suspension 1 Gram(s) Oral four times a day  tiotropium 2.5 MICROgram(s) Inhaler 2 Puff(s) Inhalation daily    SOCIAL HISTORY:  Denies ETOh,Smoking,   FAMILY HISTORY:  Family history of cirrhosis of liver (Mother)    Family history of renal failure (Sibling)    Family history of hypertension (Sibling)    Family history of diabetes mellitus (Sibling)    History of substance abuse in sibling (Sibling)        VITALS:  T(F): 98.9 (02-21-23 @ 15:30), Max: 98.9 (02-21-23 @ 15:30)  HR: 105 (02-21-23 @ 15:30)  BP: 180/92 (02-21-23 @ 15:30)  RR: 18 (02-21-23 @ 15:30)  SpO2: 93% (02-21-23 @ 15:30)  Wt(kg): --    Height (cm): 167.6 (02-20 @ 23:55)  PHYSICAL EXAM:  Constitutional: NAD  HEENT: anicteric sclera, oropharynx clear,  Neck: No JVD  Respiratory: CTAB, no wheezes, rales or rhonchi  Cardiovascular: S1, S2, RRR  Gastrointestinal: BS+, soft, NT/ND  Neurological: A/O x 3, no focal deficits  Vascular Access: AVF with thrill and bruit     LABS:  02-21    139  |  93<L>  |  60<H>  ----------------------------<  77  4.8   |  28  |  13.20<H>    Ca    9.0      21 Feb 2023 10:46  Phos  5.5     02-21    TPro  6.4  /  Alb  2.9<L>  /  TBili  0.5  /  DBili      /  AST  36  /  ALT  21  /  AlkPhos  58  02-21    Creatinine Trend: 13.20 <--, 12.40 <--                        8.4    3.02  )-----------( 81       ( 21 Feb 2023 00:11 )             27.2     Urine Studies:      RADIOLOGY & ADDITIONAL STUDIES:

## 2023-02-21 NOTE — ED PROVIDER NOTE - GASTROINTESTINAL, MLM
Nurse  associated with Jas's case called requesting medical records for 11/11/17 to present for the claim handler's review.  Records were faxed to [facility] [employer] (ID #[id number] fax [phone].   Abdomen soft, non-tender, no guarding.

## 2023-02-21 NOTE — CONSULT NOTE ADULT - ASSESSMENT
Patient is a 62y Male whom presented to the hospital with SOB. Admitted in this hospital earlier this week with COVID 19.  He did not complete HD treatment on Thursday. Presented to ED with SOB.  Renal consulted for HD     # ESRD. admitted with SOB. s/p HD earlier in the day . Cut  HD time today   advised compliance with HD   # anemia of CKD. epogen on HD   # CT abdomen noted with double  duct sign? head of pancreas mass- per primary team

## 2023-02-21 NOTE — ED PROVIDER NOTE - CARE PLAN
Principal Discharge DX:	Hypoxia   1 Principal Discharge DX:	Hypoxia  Secondary Diagnosis:	CHF exacerbation

## 2023-02-21 NOTE — H&P ADULT - PROBLEM SELECTOR PLAN 4
pt takes insulin as needed  was hypoglycemic in ED, improved s/p d50    - iss q6 while NPO  - fs q6h

## 2023-02-21 NOTE — H&P ADULT - PROBLEM SELECTOR PLAN 5
pt takes amlodipine 10 mg qd, hydralazine 25 mg tid, toprol XL 25 mg qd    - c/w home meds with parameters No

## 2023-02-22 NOTE — CONSULT NOTE ADULT - NS ATTEND AMEND GEN_ALL_CORE FT
- Dilated pancreatic duct.  - Dilated bile duct.  - N/v.  - Abdominal discomfort.     Patient seen and examined. Hx of ESRD on HD, noncompliance, recurring n/v with esophagitis presenting for SOB and nausea in setting of noncompliance with dialysis sessions, GI consulted for incidental double duct sign on CT (noncontrast). LFTs normal. Recommend MRI/MRCP to further evaluate.

## 2023-02-22 NOTE — CONSULT NOTE ADULT - ASSESSMENT
Patient is a 62M with a significant PMHx of ESRD (HD on TTHS), HTN, CAD, HLD, T2DM, GERD, remote drug use, glaucoma (legally blind), who presented from [facility] with sob and nausea. GI is consulted for abnormal CT.     Patient reports nausea is chronic and triggered by any type of food he eats, denies dysphagia or odynophagia, but does endorse food "comes back up". He also reports vomiting "white" colored output and hematemesis. Denies cp, headache, myalgia, abdominal pain/tenderness, and weakness. He denies family hx of liver disease or colorectal cancers. Denies taking herbal supplements or medication changes. Previous EGD/colo (8/3/22) was aborted early due to food in gastric body and poor bowel prep. Of note, he is known to the GI service per chart review and has denied endoscopic evaluations in the past. Endorses remote drug use "back in the days, sniff/smoke crack-cocaine, marijuana", current smoker 3 cigarettes a day x 4-5 years. No ETOH use.      In the ED, CTAP showed PD dilated 5mm -->  double duct sign, new/increased from the priors, suggests underlying ampullary stenosis or possible pancreatic head mass, distal CBD 8mm, distended GB no stones. Last HD was 2/21 however aborted early due to patient request.   Labs notable for WBC 3.02, Hgb 8.4, Plt 81, Trop 132.7, BUN 56, Cr 12.4, BNP >175,000. Bcx neg.     #Abnormal CT  #Double duct sign  #Dilated CBD  #Nausea  #Vomiting  #Esophagitis  #Hematemesis  #Hx of drug use  Patient expresses frustration and requesting to go home asap however encouraged patient to remain in the hospital for further workup given abnormal imaging. Patient is agreeable at this time for endoscopic evaluation. He may benefit from EUS/ERCP for further evaluation, will need to coordinate with HD sessions while inpatient. He endorses n/v/hematemesis, labs reveal he is pancytopenic however may be 2/2 chronic disease, no overt signs of bleeding at this time, renal following, appreciate recs. Lipase unremarkable 42.   EGD (01/2021): LA grade D esophagitis, biopsies unremarkable  EGD (8/3/22) for DELROY, dysphagia: esophagus normal, food in gastric body, aborted.  [place] (8/3/22): poor bowel prep.     	- Recommend MRCP for better visualization   	- Tentative EUS/ERCP, TBD  	- PRN IV anti-emetics  	- Carafate slurry TID  	- IV/PO Protonix 40mg BID    This note and its recommendations herein are preliminary until such time as cosigned by an attending.    GI will continue to follow.  Thank you for this consult! Patient is a 62M with a significant PMHx of ESRD (HD on TTHS), HTN, CAD, HLD, T2DM, GERD, remote drug use, glaucoma (legally blind), who presented from [facility] with sob and nausea. GI is consulted for abnormal CT.     Patient reports nausea is chronic and triggered by any type of food he eats, denies dysphagia or odynophagia, but does endorse food "comes back up". He also reports vomiting "white" colored output and hematemesis. Denies cp, headache, myalgia, abdominal pain/tenderness, and weakness. He denies family hx of liver disease or colorectal cancers. Denies taking herbal supplements or medication changes. Previous EGD/colo (8/3/22) was aborted early due to food in gastric body and poor bowel prep. Of note, he is known to the GI service per chart review and has denied endoscopic evaluations in the past. Endorses remote drug use "back in the days, sniff/smoke crack-cocaine, marijuana", current smoker 3 cigarettes a day x 4-5 years. No ETOH use.      In the ED, CTAP showed PD dilated 5mm -->  double duct sign, new/increased from the priors, suggests underlying ampullary stenosis or possible pancreatic head mass, distal CBD 8mm, distended GB no stones. Last HD was 2/21 however aborted early due to patient request.   Labs notable for WBC 3.02, Hgb 8.4, Plt 81, Trop 132.7, BUN 56, Cr 12.4, BNP >175,000. Bcx neg.     #Abnormal CT  #Double duct sign  #Dilated CBD  #Nausea  #Vomiting  #Esophagitis  #Hematemesis  #Hx of drug use  Patient expresses frustration and requesting to go home asap however encouraged patient to remain in the hospital for further workup given abnormal imaging. Patient is agreeable at this time for endoscopic evaluation. He may benefit from EUS/ERCP for further evaluation, will need to coordinate with HD sessions while inpatient. He endorses n/v/hematemesis, labs reveal he is pancytopenic however may be 2/2 chronic disease, no overt signs of bleeding at this time, renal following, appreciate recs. Lipase unremarkable 42.   EGD (01/2021): LA grade D esophagitis, biopsies unremarkable  EGD (8/3/22) for DELROY, dysphagia: esophagus normal, food in gastric body, aborted.  [place] (8/3/22): poor bowel prep.     	- Recommend MRCP with contrast for better visualization, may require renal coordination for dialysis after imaging  	- PRN IV anti-emetics  	- Carafate slurry TID  	- IV/PO Protonix 40mg BID  	- Reeval need for EUS/endoscopic evaluation pending MRCP results    This note and its recommendations herein are preliminary until such time as cosigned by an attending.    GI will continue to follow.  Thank you for this consult! Patient is a 62M with a significant PMHx of ESRD (HD on TTHS), HTN, CAD, HLD, T2DM, GERD, remote drug use, glaucoma (legally blind), who presented from [facility] with sob and nausea. GI is consulted for abnormal CT.     Patient reports nausea is chronic and triggered by any type of food he eats, denies dysphagia or odynophagia, but does endorse food "comes back up". He also reports vomiting "white" colored output and hematemesis. Denies cp, headache, myalgia, abdominal pain/tenderness, and weakness. He denies family hx of liver disease or colorectal cancers. Denies taking herbal supplements or medication changes. Previous EGD/colo (8/3/22) was aborted early due to food in gastric body and poor bowel prep. Of note, he is known to the GI service per chart review and has denied endoscopic evaluations in the past. Endorses remote drug use "back in the days, sniff/smoke crack-cocaine, marijuana", current smoker 3 cigarettes a day x 4-5 years. No ETOH use.      In the ED, CTAP showed PD dilated 5mm -->  double duct sign, new/increased from the priors, suggests underlying ampullary stenosis or possible pancreatic head mass, distal CBD 8mm, distended GB no stones. Last HD was 2/21 however aborted early due to patient request.   Labs notable for WBC 3.02, Hgb 8.4, Plt 81, Trop 132.7, BUN 56, Cr 12.4, BNP >175,000. Bcx neg.     #Abnormal CT  #Double duct sign  #Dilated CBD  #Nausea  #Vomiting  #Esophagitis  #Hematemesis  #Hx of drug use  Patient expresses frustration and requesting to go home asap however encouraged patient to remain in the hospital for further workup given abnormal imaging. Patient is agreeable at this time for endoscopic evaluation. He may benefit from EUS/ERCP for further evaluation, will need to coordinate with HD sessions while inpatient. He endorses n/v/hematemesis, labs reveal he is pancytopenic however may be 2/2 chronic disease, no overt signs of bleeding at this time, renal following, appreciate recs. Lipase unremarkable 42.   EGD (01/2021): LA grade D esophagitis, biopsies unremarkable  EGD (8/3/22) for DELROY, dysphagia: esophagus normal, food in gastric body, aborted.  [place] (8/3/22): poor bowel prep.     	- Recommend MRCP with contrast for better visualization, may require renal coordination for dialysis after imaging  	- PRN IV anti-emetics  	- Carafate slurry TID  	- IV/PO Protonix 40mg BID  	- Reeval need for EUS/endoscopic evaluation pending MRCP results  	- In the meantime, please obtain CA 19-9, CEA, and AFP levels    This note and its recommendations herein are preliminary until such time as cosigned by an attending.    GI will continue to follow.  Thank you for this consult!

## 2023-02-22 NOTE — CONSULT NOTE ADULT - SUBJECTIVE AND OBJECTIVE BOX
[facility]    Patient is a 62y old  Male who presents with a chief complaint of SOB (21 Feb 2023 17:20)    HPI:  62 year old, Male, w/ PMH of ESRD on dialysis (Tues, Thurs, Sat), DM2, CAD s/p MI, CHF, glaucoma, legally blind, HTN, orthostatic hypotension, hyperparathyroid, hyperkalemia, LS spinal stenosis, osteoarthritis, osteoporosis, PAD, hx of osteomyelitis of thoracic vertebrae, remote hx of crack-cocaine use disorder, PSH of BKA of L leg presents with shortness of breath and nausea. Patient states he has been feeling nauseous and short of breath since yesterday. Reports he has been having intermittent nausea for a few weeks but has gotten worse. Denies any chest pain, palpitaitons, fever, chills, n/v/d, urinary or bowel symptoms. Denies dizziness, weight loss, night sweats, unintentional weight loss.    ED Course:   Vitals /85 P 101 T98.8F R 18 SpO2 100% NRB 8L  Meds: none (21 Feb 2023 09:18)      PMH/PSH:  PAST MEDICAL & SURGICAL HISTORY:  Hypertension  Adrenal insufficiency  h/o  Anemia  Glaucoma  Coronary artery disease  HLD (hyperlipidemia)  Peripheral vascular disease  Spinal stenosis of lumbosacral region  Hyperparathyroidism  Diabetes mellitus  Diabetic neuropathy  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  Osteoarthritis  Vision loss of left eye  blind  ESRD on hemodialysis  T/Th/S  Cataract  both eyes - hx of sx done  BPH (benign prostatic hyperplasia)  UTI (urinary tract infection)  hx of      Bladder mass  hx of      H/O hematuria      Osteoporosis      Vision loss of right eye  decreased      Depression      Chronic GERD      Osteomyelitis of vertebra      CHF (congestive heart failure)      Below knee amputation status, left  2012- pt is wearing prostesis      History of right cataract extraction      History of left cataract extraction      S/P arteriovenous (AV) fistula creation  right arm brachiocephalic arteriovenous fistula on 11/08/2018      H/O hematuria  s/p bladder bx and fulguration 2/25/2020      H/O transurethral destruction of bladder lesion  2020      History of excision of mass  back mass on 03/31/2021        FH:  FAMILY HISTORY:  Family history of cirrhosis of liver (Mother)    Family history of renal failure (Sibling)    Family history of hypertension (Sibling)    Family history of diabetes mellitus (Sibling)    History of substance abuse in sibling (Sibling)      MEDS:  MEDICATIONS  (STANDING):  albuterol    90 MICROgram(s) HFA Inhaler 2 Puff(s) Inhalation every 6 hours  amLODIPine   Tablet 10 milliGRAM(s) Oral daily  budesonide 160 MICROgram(s)/formoterol 4.5 MICROgram(s) Inhaler 2 Puff(s) Inhalation two times a day  cyanocobalamin 1000 MICROGram(s) Oral daily  epoetin beverley-epbx (RETACRIT) Injectable 12830 Unit(s) IV Push <User Schedule>  ferrous    sulfate 325 milliGRAM(s) Oral daily  folic acid 1 milliGRAM(s) Oral daily  furosemide    Tablet 80 milliGRAM(s) Oral daily  heparin   Injectable 5000 Unit(s) SubCutaneous every 8 hours  hydrALAZINE 25 milliGRAM(s) Oral every 8 hours  insulin lispro (ADMELOG) corrective regimen sliding scale   SubCutaneous every 6 hours  levothyroxine 25 MICROGram(s) Oral daily  lidocaine   4% Patch 1 Patch Transdermal daily  metolazone 10 milliGRAM(s) Oral daily  metoprolol succinate ER 25 milliGRAM(s) Oral daily  ondansetron Injectable 4 milliGRAM(s) IV Push once  pantoprazole  Injectable 40 milliGRAM(s) IV Push every 12 hours  polyethylene glycol 3350 17 Gram(s) Oral daily  senna 2 Tablet(s) Oral at bedtime  simvastatin 40 milliGRAM(s) Oral at bedtime  sodium zirconium cyclosilicate 10 Gram(s) Oral <User Schedule>  sucralfate suspension 1 Gram(s) Oral four times a day  tiotropium 2.5 MICROgram(s) Inhaler 2 Puff(s) Inhalation daily    MEDICATIONS  (PRN):  acetaminophen     Tablet .. 650 milliGRAM(s) Oral every 6 hours PRN Temp greater or equal to 38C (100.4F), Mild Pain (1 - 3)  zolpidem 5 milliGRAM(s) Oral at bedtime PRN Insomnia    Allergies    fish (Rash)  liver (Anaphylaxis)  No Known Drug Allergies    Intolerances      ROS: A detailed set of ROS were asked and negative except those outlined in GI HPI.  ______________________________________________________________________  PHYSICAL EXAM:  T(C): 37.1 (02-22-23 @ 05:23), Max: 37.2 (02-21-23 @ 15:30)  HR: 92 (02-22-23 @ 05:23)  BP: 153/75 (02-22-23 @ 05:23)  RR: 17 (02-22-23 @ 05:23)  SpO2: 91% (02-22-23 @ 09:15)  Wt(kg): --      GEN: NAD  HEENT: legally blind, conjunctivae anicteric, neck supple, dry mucous membranes  PULM: LSCTAB, no wheezing, rales, or rhonchi  CV: RRR, no m/r/b  GI: Soft, NT, ND; +BS in all four quadrants, no ascites, no [place]'s sign  MSK: KRISTAN HUMPHREY  NEURO: A&O x 3, no gross deficits  ______________________________________________________________________  LABS:                        8.4    3.02  )-----------( 81       ( 21 Feb 2023 00:11 )             27.2     02-21    139  |  93<L>  |  60<H>  ----------------------------<  77  4.8   |  28  |  13.20<H>    Ca    9.0      21 Feb 2023 10:46  Phos  5.5     02-21    TPro  6.4  /  Alb  2.9<L>  /  TBili  0.5  /  DBili  x   /  AST  36  /  ALT  21  /  AlkPhos  58  02-21    LIVER FUNCTIONS - ( 21 Feb 2023 00:11 )  Alb: 2.9 g/dL / Pro: 6.4 g/dL / ALK PHOS: 58 U/L / ALT: 21 U/L DA / AST: 36 U/L / GGT: x           PT/INR - ( 21 Feb 2023 00:11 )   PT: 12.2 sec;   INR: 1.02 ratio         PTT - ( 21 Feb 2023 00:11 )  PTT:28.4 sec  ____________________________________________    IMAGING:    CT ABDOMEN AND PELVIS   ORDERED BY: FREDDIE JEROME     PROCEDURE DATE:  02/21/2023          INTERPRETATION:  CLINICAL INFORMATION: Hypoxia, nausea and vomiting    COMPARISON: December 26, 2021    CONTRAST/COMPLICATIONS:  IV Contrast: NONE  Oral Contrast: NONE  Complications: None reported at time of study completion    PROCEDURE:  CT of the Abdomen and Pelvis was performed.  Sagittal and coronal reformats were performed.    Note: The exam is limited because some types of pathology may not be   adequately demonstrated due to lack of contrast enhancement.    FINDINGS:  LOWER CHEST: Right basilar infiltrate with trace right pleural effusion.   Left basilar linear atelectasis.    LIVER: Unremarkable  BILE DUCTS: Mildly dilated, measuring up to 8 mm in caliber in the distal   common duct  GALLBLADDER: Distended without calcified gallstone  SPLEEN: Unremarkable  PANCREAS: Pancreatic duct is dilated up to 5 mm, in from the prior.  ADRENALS: Unchanged low density thickening  KIDNEYS/URETERS: Atrophic with numerous punctate parenchymal   calcifications.    BLADDER: Decompressed, limiting evaluation  REPRODUCTIVE ORGANS: Prostate is enlarged.    BOWEL: No bowel obstruction. Normal appendix.  PERITONEUM: No free air. Limited evaluation for mesenteric edema due to   lack of regional fat planes.  VESSELS: Atherosclerotic changes.  RETROPERITONEUM/LYMPH NODES: Limited evaluation due to lack of contrast   and lack of regional fat planes  ABDOMINAL WALL: Within normal limits.  BONES: No acute osseous abnormality. Chronic fusion of the T11-T12   vertebral bodies, could be related to past trauma, infection or   congenital.    IMPRESSION:    1. Double duct sign, new/increased from the priors, suggests underlying   ampullary stenosis or possibly a pancreatic head mass.  2. Right basilar atelectasis or pneumonia with small right pleural   effusion.

## 2023-02-23 NOTE — PROGRESS NOTE ADULT - PROBLEM SELECTOR PLAN 6
pt takes amlodipine 10 mg qd, hydralazine 25 mg tid, toprol XL 25 mg qd    - c/w home meds with parameters
pt takes simvastatin 10 mg qhs    - c/w home meds

## 2023-02-23 NOTE — PROGRESS NOTE ADULT - PROBLEM SELECTOR PLAN 2
pt has h/o CHF now with acute hypoxic respiratory failure  takes lasix and metolazone    - c/w home meds  - wean oxygen as tolerated  - telemonitoring  - daily weights, strict I&O
pt has h/o CHF now with acute hypoxic respiratory failure  takes lasix and metolazone    - c/w home meds  - wean oxygen as tolerated  - telemonitoring  - daily weights, strict I&O

## 2023-02-23 NOTE — PROGRESS NOTE ADULT - PROBLEM SELECTOR PLAN 5
pt takes amlodipine 10 mg qd, hydralazine 25 mg tid, toprol XL 25 mg qd    - c/w home meds with parameters
pt takes insulin as needed  was hypoglycemic in ED, improved s/p d50    - iss q6 while NPO  - fs q6h     (2/22)

## 2023-02-23 NOTE — CONSULT NOTE ADULT - ASSESSMENT
pancytopenia --  differential is white and multifactorial, certainly he may have anemia chronic disease in the setting of end-stage renal disease, his WBC has remained mostly stable, in the normal range today.  May also be reactive  -- complete evaluation, check B12, folate, ferritin, iron panel, SPEP, immunofixation, haptoglobin, stool guaiac, retic count  -- counts are adequate, monitor for now    pancreatic lesion -- GI eval appreciated  --  pending MRI/MRCP for further evaluation  -- if noted to have suspicious pancreatic lesion, please also obtain a CT of the chest to have full initial staging, may need surgical oncology evaluation as well  -- check a CA 19 9 if suspicious for pancreatic cancer    n/v -- per GI and primary team    Will follow pancytopenia --  differential is white and multifactorial, certainly he may have anemia chronic disease in the setting of end-stage renal disease, his WBC has remained mostly stable, in the normal range today.  May also be reactive  -- complete evaluation, check B12, folate, ferritin, iron panel, SPEP, immunofixation, haptoglobin, stool guaiac, retic count  -- counts are adequate, monitor for now    pancreatic lesion -- GI eval appreciated  --  pending MRI/MRCP for further evaluation  -- if noted to have suspicious pancreatic lesion, please also obtain a CT of the chest to have full initial staging, may need surgical oncology evaluation as well  -- check a CA 19 9 if suspicious for pancreatic cancer    n/v -- per GI and primary team     SOb -- per primary team    ESRD -- HD per renal    Will follow, d/w pt

## 2023-02-23 NOTE — PROGRESS NOTE ADULT - PROBLEM SELECTOR PLAN 7
pt takes levothyroxine 25 mcg qam    - c/w home meds
pt takes simvastatin 10 mg qhs    - c/w home meds

## 2023-02-23 NOTE — CHART NOTE - NSCHARTNOTEFT_GEN_A_CORE
Notified by RN that pt was anxious and short of breath, assessed pt at the bedside, pt was receiving a nebulizer treatment at time of assessment, vitals reviewed sating 95%, on physical exam course breath sounds b/l, tachycardic likely from nebulizer that made him anxious. Pt repositioned, put back on NC, stated he felt better, denied any chest pain, headache, weakness. EKG was ordered, pt refused EKG, all risks explained to pt however pt stated that he wanted to be left alone. Will continue to monitor.

## 2023-02-23 NOTE — PROGRESS NOTE ADULT - PROBLEM SELECTOR PLAN 3
pt has history of ESRD on HD TTS    - Nephro consulted, Dr. Mosher  - c/w HD as per schedule  - wean oxygen as tolerated
pt has history of ESRD on HD TTS    - Nephro consulted, Dr. Mosher  - c/w HD as per schedule  - wean oxygen as tolerated  - next HD session today 2/22

## 2023-02-23 NOTE — PROGRESS NOTE ADULT - PROBLEM SELECTOR PLAN 4
Noted to have pancytopenia on admission and h/o of pancytopenia  did not have prior w/u  Heme/onc Dr. Gonzales consulted, f/u recs
pt takes insulin as needed  was hypoglycemic in ED, improved s/p d50    - iss q6 while NPO  - fs q6h

## 2023-02-23 NOTE — CHART NOTE - NSCHARTNOTEFT_GEN_A_CORE
Patient is a 62M with a significant PMHx of ESRD (HD on TTHS), HTN, CAD, HLD, T2DM, GERD, remote drug use, glaucoma (legally blind), who presented from [facility] with sob and nausea. GI is consulted for abnormal CT.     CTAP: PD dilated 5mm --> double duct sign, new/increased from priors, suggests underlying ampullary stenosis or possible pancreatic head mass, distal CBD 8mm, distended GB no stones. Last HD 2/23.  MRCP: diffuse iron deposition in liver, lesions likely cysts or hemangiomas, distal CBD 14 mm, no choledo, GB small cystic lesion --> adenomyomatosis, spleen with iron deposits, PD 7mm, no mass, c/w hemochromatosis.  Per chart review, ferritin (11/13/22) elevated 534. LFTs wnl.  Patient may benefit from outpatient GI follow up with EUS for further workup.    	- No indication for acute GI intervention at this time  	- Continue management per primary team.  	- Outpatient GI follow up with Dr. Lee for EUS    GI will sign off at this time.  Thank you for involving us in the care of Mr. Rush Lassiter.  Please re-consult GI PRN. Patient is a 62M with a significant PMHx of ESRD (HD on TTHS), HTN, CAD, HLD, T2DM, GERD, remote drug use, glaucoma (legally blind), who presented from [facility] with sob and nausea. GI is consulted for abnormal CT.     CTAP: PD dilated 5mm --> double duct sign, new/increased from priors, suggests underlying ampullary stenosis or possible pancreatic head mass, distal CBD 8mm, distended GB no stones. Last HD 2/23.  MRCP: diffuse iron deposition in liver, lesions likely cysts or hemangiomas, distal CBD 14 mm, no choledo, GB small cystic lesion --> adenomyomatosis, spleen with iron deposits, PD 7mm, no mass, c/w hemochromatosis.  Per chart review, ferritin (11/13/22) elevated 534. LFTs wnl.  Close outpatient GI follow up with EUS for further workup.    	- No indication for acute GI intervention at this time  	- Continue management per primary team.  	- Outpatient GI follow up for EUS.     GI will sign off at this time.  Thank you for involving us in the care of Mr. Rush Lassiter.  Please re-consult GI PRN.

## 2023-02-23 NOTE — PROGRESS NOTE ADULT - ATTENDING COMMENTS
# CASE D/W PATIENT AND WITH PATIENT'S BROTHER ARTEMIO YOUNG @ [phone] - CASE DISCUSSED AT LENGTH, ALL QUESTIONS ANSWERED. DISCUSSED THAT HE HAS MULTIPLE COMORBIDITIES WITH HIGH MORBIDITY AND MORTALITY RISK. PATIENT IS NONCOMPLIANT WITH RECOMMENDATIONS, EVALUATIONS AND INTERVENTIONS DESPITE ROUTINE COUNSELLING, D/W PATIENT THAT THIS INCREASES HIS RISK OF WORSENING CLINICAL CONDITION AND DEATH. PATIENT AND BROTHER VERBALIZED UNDERSTANDING.    # RECURRENT INTRACTABLE NAUSEA/VOMITING  # ? DOUBLE DUCT SIGN, PANCREATIC HEAD  # HISTORY OF ESOPHAGITIS AND DUODENITIS [1/2021], HX OF GASTROPARESIS W/ EVIDENCE OF THICKENED ESOPHAGUS ON PREVIOUS CT SCAN   - EGD AT [facility] - DEMONSTRATED RETAINED FOOD PRODUCT IN STOMACH, RECOMMENDED FOR GASTRIC EMPTYING STUDY  - DENIES RECENT HEMATEMESIS   - MONITORING HGB, PLACED ON PPI BID  - CARAFATE, PRN ANTIEMETICS  - MONITORING FOR SYMPTOMS    - NOTED CT A/P  - NPO   - F/U TUMOR MARKERS  - F/U MRCP [d/w nephrology, will defer contrast given requirement for consistent HD after and patient's recurrent hx of noncompliance with HD]  - GI CONSULT - DR. PONCE      # HYPOGLYCEMIC EPISODE, LABILE BLOOD GLUCOSE  # SUSPECTED GASTROPARESIS  # UNDERLYING DM  - F/U HBA1C  - SSI + FS  - ENDOCRINOLOGY CONSULT    - PRN ANTIEMETIC, WILL CONSIDER TRIAL OF REGLAN IF PERSISTENT    - PATIENT REPORTS HE INTERMITTENTLY HAS EPISODES OF NONBLOODY EMESIS ; PATIENT MAY NEED CLOSE MONITORING OF FINGERSTICKS AND ONGOING OUTPATIENT EVALUATION AND MANAGEMENT OF GASTROPARESIS    # ACUTE HYPOXIC RESPIRATORY FAILURE S/T ? PULMONARY EDEMA   # UNCONTROLLED HTN  # ESRD ON HD TTS - W/ HX OF NONCOMPLIANCE    - HYDRALAZINE, METOPROLOL AND NORVASC  - PLACED ON DECADRON AND LEVAQUIN RECENTLY  - SUPPLEMENTAL O2  - NEPHROLOGY CONSULT    # PATIENT UNDERGOING OUTPATIENT WORKUP FOR CENTRAL VENOUS STENOSIS THROUGH NEPHROLOGY TEAM    # ANEMIA OF CKD  # HX OF PANCYTOPENIA   - TREND HGB, TRANSFUSION THRESHOLD HGB < 7  - TYPE AND SCREEN  - ON EPO  - DENIES RECENT HEMATEMESIS, MELENA, HEMATOCHEZIA    # SEVERE PROTEIN CALORIE MALNUTRITION, FAILURE TO THRIVE - NUTRITIONAL SUPPLEMENT    # HLD  # PARTIALLY BLIND  # S/P LEFT BKA  # HX OF PVD  # LS SPINAL STENOSIS  # GI AND DVT PPX.
# CASE D/W PATIENT AND WITH PATIENT'S BROTHER ARTEMIO YOUNG @ [phone] - CASE DISCUSSED AT LENGTH, ALL QUESTIONS ANSWERED. DISCUSSED THAT HE HAS MULTIPLE COMORBIDITIES WITH HIGH MORBIDITY AND MORTALITY RISK. PATIENT IS NONCOMPLIANT WITH RECOMMENDATIONS, EVALUATIONS AND INTERVENTIONS DESPITE ROUTINE COUNSELLING, D/W PATIENT THAT THIS INCREASES HIS RISK OF WORSENING CLINICAL CONDITION AND DEATH. PATIENT AND BROTHER VERBALIZED UNDERSTANDING.    # RECURRENT INTRACTABLE NAUSEA/VOMITING  # ? DOUBLE DUCT SIGN, PANCREATIC HEAD  # HISTORY OF ESOPHAGITIS AND DUODENITIS [1/2021], HX OF GASTROPARESIS W/ EVIDENCE OF THICKENED ESOPHAGUS ON PREVIOUS CT SCAN   - EGD AT [facility] - DEMONSTRATED RETAINED FOOD PRODUCT IN STOMACH, RECOMMENDED FOR GASTRIC EMPTYING STUDY  - DENIES RECENT HEMATEMESIS   - MONITORING HGB, PLACED ON PPI BID  - CARAFATE, PRN ANTIEMETICS  - MONITORING FOR SYMPTOMS    - NOTED CT A/P  - TOLERATING DIET ; PATIENT REPEATEDLY COUNSELLED TO CHEW FOOD CAUTIOUSLY AND CONSUME SMALL BITES W/ REGULAR CLEARING WITH WATER BETWEEN BITES.  - F/U TUMOR MARKERS  - MRCP NOTED ; GI RECOMMENDING OUTPATIENT F/U W/ PLAN FOR FURTHER WORKUP AS OUTPATIENT  - GI CONSULT - DR. PONCE    # HYPOGLYCEMIC EPISODE, LABILE BLOOD GLUCOSE  # SUSPECTED GASTROPARESIS  # UNDERLYING DM  - F/U HBA1C  - SSI + FS  - ENDOCRINOLOGY CONSULT    - PRN ANTIEMETIC, WILL CONSIDER TRIAL OF REGLAN IF PERSISTENT    - PATIENT REPORTS HE INTERMITTENTLY HAS EPISODES OF NONBLOODY EMESIS ; PATIENT MAY NEED CLOSE MONITORING OF FINGERSTICKS AND ONGOING OUTPATIENT EVALUATION AND MANAGEMENT OF GASTROPARESIS    # ACUTE HYPOXIC RESPIRATORY FAILURE S/T ? PULMONARY EDEMA   # UNCONTROLLED HTN  # ESRD ON HD TTS - W/ HX OF NONCOMPLIANCE    - HYDRALAZINE, METOPROLOL AND NORVASC  - PLACED ON DECADRON AND LEVAQUIN RECENTLY  - SUPPLEMENTAL O2  - NEPHROLOGY CONSULT    # ? HEMOCHROMATOSIS  - NOTED FERRITIN, F/U IRON AND TIBC, TRANSFERRIN SAT  - WILL REFER HEPATOLOGY AS OUTPATIENT PENDING ABOVE WORKUP    # PATIENT UNDERGOING OUTPATIENT WORKUP FOR CENTRAL VENOUS STENOSIS THROUGH NEPHROLOGY TEAM    # ANEMIA OF CKD  # HX OF PANCYTOPENIA   - TREND HGB, TRANSFUSION THRESHOLD HGB < 7  - TYPE AND SCREEN  - ON EPO  - DENIES RECENT HEMATEMESIS, MELENA, HEMATOCHEZIA  - HEME/ONC CONSULT    # SEVERE PROTEIN CALORIE MALNUTRITION, FAILURE TO THRIVE - NUTRITIONAL SUPPLEMENT    # HLD  # PARTIALLY BLIND  # S/P LEFT BKA  # HX OF PVD  # LS SPINAL STENOSIS  # GI AND DVT PPX.

## 2023-02-23 NOTE — CONSULT NOTE ADULT - SUBJECTIVE AND OBJECTIVE BOX
Reason for consult:    HPI:  62 year old, Male, w/ PMH of ESRD on dialysis (Tues, Thurs, Sat), DM2, CAD s/p MI, CHF, glaucoma, legally blind, HTN, orthostatic hypotension, hyperparathyroid, hyperkalemia, LS spinal stenosis, osteoarthritis, osteoporosis, PAD, hx of osteomyelitis of thoracic vertebrae, remote hx of crack-cocaine use disorder, PSH of BKA of L leg presents with shortness of breath and nausea. Patient states he has been feeling nauseous and short of breath since yesterday. Reports he has been having intermittent nausea for a few weeks but has gotten worse. Denies any chest pain, palpitaitons, fever, chills, n/v/d, urinary or bowel symptoms. Denies dizziness, weight loss, night sweats, unintentional weight loss.    ED Course:   Vitals /85 P 101 T98.8F R 18 SpO2 100% NRB 8L  Meds: none (21 Feb 2023 09:18)      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      Osteoarthritis      Vision loss of left eye  blind      ESRD on hemodialysis  T/Th/S      Cataract  both eyes - hx of sx done      BPH (benign prostatic hyperplasia)      UTI (urinary tract infection)  hx of      Bladder mass  hx of      H/O hematuria      Osteoporosis      Vision loss of right eye  decreased      Depression      Chronic GERD      Osteomyelitis of vertebra      CHF (congestive heart failure)      Below knee amputation status, left  2012- pt is wearing prostesis      History of right cataract extraction      History of left cataract extraction      S/P arteriovenous (AV) fistula creation  right arm brachiocephalic arteriovenous fistula on 11/08/2018      H/O hematuria  s/p bladder bx and fulguration 2/25/2020      H/O transurethral destruction of bladder lesion  2020      History of excision of mass  back mass on 03/31/2021          FAMILY HISTORY:  Family history of cirrhosis of liver (Mother)    Family history of renal failure (Sibling)    Family history of hypertension (Sibling)    Family history of diabetes mellitus (Sibling)    History of substance abuse in sibling (Sibling)        Alochol: Denied  Smoking: Nonsmoker  Drug Use: Denied  Marital Status:         Allergies    fish (Rash)  liver (Anaphylaxis)  No Known Drug Allergies    Intolerances        MEDICATIONS  (STANDING):  albuterol    90 MICROgram(s) HFA Inhaler 2 Puff(s) Inhalation every 6 hours  albuterol/ipratropium for Nebulization 3 milliLiter(s) Nebulizer every 6 hours  amLODIPine   Tablet 10 milliGRAM(s) Oral daily  budesonide 160 MICROgram(s)/formoterol 4.5 MICROgram(s) Inhaler 2 Puff(s) Inhalation two times a day  cyanocobalamin 1000 MICROGram(s) Oral daily  epoetin beverley-epbx (RETACRIT) Injectable 58061 Unit(s) IV Push <User Schedule>  ferrous    sulfate 325 milliGRAM(s) Oral daily  folic acid 1 milliGRAM(s) Oral daily  furosemide    Tablet 80 milliGRAM(s) Oral daily  heparin   Injectable 5000 Unit(s) SubCutaneous every 8 hours  hydrALAZINE 25 milliGRAM(s) Oral every 8 hours  insulin lispro (ADMELOG) corrective regimen sliding scale   SubCutaneous every 6 hours  levothyroxine 25 MICROGram(s) Oral daily  lidocaine   4% Patch 1 Patch Transdermal daily  metolazone 10 milliGRAM(s) Oral daily  metoprolol succinate ER 25 milliGRAM(s) Oral daily  ondansetron Injectable 4 milliGRAM(s) IV Push once  pantoprazole  Injectable 40 milliGRAM(s) IV Push every 12 hours  polyethylene glycol 3350 17 Gram(s) Oral daily  senna 2 Tablet(s) Oral at bedtime  simvastatin 40 milliGRAM(s) Oral at bedtime  sodium zirconium cyclosilicate 10 Gram(s) Oral <User Schedule>  sucralfate suspension 1 Gram(s) Oral four times a day  tiotropium 2.5 MICROgram(s) Inhaler 2 Puff(s) Inhalation daily    MEDICATIONS  (PRN):  acetaminophen     Tablet .. 650 milliGRAM(s) Oral every 6 hours PRN Temp greater or equal to 38C (100.4F), Mild Pain (1 - 3)  zolpidem 5 milliGRAM(s) Oral at bedtime PRN Insomnia      ROS  No fever, sweats, chills  No epistaxis, HA, sore throat  No CP, SOB, cough, sputum  No n/v/d, abd pain, melena, hematochezia  No edema  No rash  No anxiety  No back pain, joint pain  No bleeding, bruising  No dysuria, hematuria    T(C): 36.7 (02-23-23 @ 05:18), Max: 37.3 (02-22-23 @ 20:28)  HR: 94 (02-23-23 @ 05:18) (91 - 135)  BP: 147/68 (02-23-23 @ 05:18) (147/68 - 171/85)  RR: 18 (02-23-23 @ 05:18) (18 - 20)  SpO2: 100% (02-23-23 @ 08:46) (92% - 100%)  Wt(kg): --    PE  NAD  Awake, alert  Anicteric, MMM  RRR  CTAB  Abd soft, NT, ND  No c/c/e  No rash grossly  FROM                          8.6    4.38  )-----------( 110      ( 23 Feb 2023 07:05 )             27.5       02-23    138  |  96  |  46<H>  ----------------------------<  160<H>  4.9   |  31  |  12.50<H>    Ca    8.5      23 Feb 2023 07:05  Phos  4.5     02-23  Mg     2.9     02-23    CT a/p: Double duct sign, new/increased from the priors, suggests underlying ampullary stenosis or possibly a pancreatic head mass. Reason for consult: pancytopenia, pancreatic lesion    HPI:    Full consult to follow, chart reviewed.     62 year old, Male, w/ PMH of ESRD on dialysis (Tues, Thurs, Sat), DM2, CAD s/p MI, CHF, glaucoma, legally blind, HTN, orthostatic hypotension, hyperparathyroid, hyperkalemia, LS spinal stenosis, osteoarthritis, osteoporosis, PAD, hx of osteomyelitis of thoracic vertebrae, remote hx of crack-cocaine use disorder, PSH of BKA of L leg presents with shortness of breath and nausea.  He also reported nausea. He had a CT a/p done that showed Double duct sign, new/increased from the priors, suggests underlying ampullary stenosis or possibly a pancreatic head mass. He was also noted to have pancytopenia, which was not formally worked up in the past. He also had an elevated PTH, significantly elevated BNP.       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      Osteoarthritis      Vision loss of left eye  blind      ESRD on hemodialysis  T/Th/S      Cataract  both eyes - hx of sx done      BPH (benign prostatic hyperplasia)      UTI (urinary tract infection)  hx of      Bladder mass  hx of      H/O hematuria      Osteoporosis      Vision loss of right eye  decreased      Depression      Chronic GERD      Osteomyelitis of vertebra      CHF (congestive heart failure)      Below knee amputation status, left  2012- pt is wearing prostesis      History of right cataract extraction      History of left cataract extraction      S/P arteriovenous (AV) fistula creation  right arm brachiocephalic arteriovenous fistula on 11/08/2018      H/O hematuria  s/p bladder bx and fulguration 2/25/2020      H/O transurethral destruction of bladder lesion  2020      History of excision of mass  back mass on 03/31/2021          FAMILY HISTORY:  Family history of cirrhosis of liver (Mother)    Family history of renal failure (Sibling)    Family history of hypertension (Sibling)    Family history of diabetes mellitus (Sibling)    History of substance abuse in sibling (Sibling)        Alochol: Denied  Smoking: Nonsmoker  Drug Use: Denied  Marital Status:         Allergies    fish (Rash)  liver (Anaphylaxis)  No Known Drug Allergies    Intolerances        MEDICATIONS  (STANDING):  albuterol    90 MICROgram(s) HFA Inhaler 2 Puff(s) Inhalation every 6 hours  albuterol/ipratropium for Nebulization 3 milliLiter(s) Nebulizer every 6 hours  amLODIPine   Tablet 10 milliGRAM(s) Oral daily  budesonide 160 MICROgram(s)/formoterol 4.5 MICROgram(s) Inhaler 2 Puff(s) Inhalation two times a day  cyanocobalamin 1000 MICROGram(s) Oral daily  epoetin beverley-epbx (RETACRIT) Injectable 74486 Unit(s) IV Push <User Schedule>  ferrous    sulfate 325 milliGRAM(s) Oral daily  folic acid 1 milliGRAM(s) Oral daily  furosemide    Tablet 80 milliGRAM(s) Oral daily  heparin   Injectable 5000 Unit(s) SubCutaneous every 8 hours  hydrALAZINE 25 milliGRAM(s) Oral every 8 hours  insulin lispro (ADMELOG) corrective regimen sliding scale   SubCutaneous every 6 hours  levothyroxine 25 MICROGram(s) Oral daily  lidocaine   4% Patch 1 Patch Transdermal daily  metolazone 10 milliGRAM(s) Oral daily  metoprolol succinate ER 25 milliGRAM(s) Oral daily  ondansetron Injectable 4 milliGRAM(s) IV Push once  pantoprazole  Injectable 40 milliGRAM(s) IV Push every 12 hours  polyethylene glycol 3350 17 Gram(s) Oral daily  senna 2 Tablet(s) Oral at bedtime  simvastatin 40 milliGRAM(s) Oral at bedtime  sodium zirconium cyclosilicate 10 Gram(s) Oral <User Schedule>  sucralfate suspension 1 Gram(s) Oral four times a day  tiotropium 2.5 MICROgram(s) Inhaler 2 Puff(s) Inhalation daily    MEDICATIONS  (PRN):  acetaminophen     Tablet .. 650 milliGRAM(s) Oral every 6 hours PRN Temp greater or equal to 38C (100.4F), Mild Pain (1 - 3)  zolpidem 5 milliGRAM(s) Oral at bedtime PRN Insomnia      ROS  No fever, sweats, chills  No epistaxis, HA, sore throat  No CP, SOB, cough, sputum  No n/v/d, abd pain, melena, hematochezia  No edema  No rash  No anxiety  No back pain, joint pain  No bleeding, bruising  No dysuria, hematuria    T(C): 36.7 (02-23-23 @ 05:18), Max: 37.3 (02-22-23 @ 20:28)  HR: 94 (02-23-23 @ 05:18) (91 - 135)  BP: 147/68 (02-23-23 @ 05:18) (147/68 - 171/85)  RR: 18 (02-23-23 @ 05:18) (18 - 20)  SpO2: 100% (02-23-23 @ 08:46) (92% - 100%)  Wt(kg): --    PE  NAD  Awake, alert  Anicteric  limited 2/2 covid pandemic                          8.6    4.38  )-----------( 110      ( 23 Feb 2023 07:05 )             27.5       02-23    138  |  96  |  46<H>  ----------------------------<  160<H>  4.9   |  31  |  12.50<H>    Ca    8.5      23 Feb 2023 07:05  Phos  4.5     02-23  Mg     2.9     02-23    CT a/p: Double duct sign, new/increased from the priors, suggests underlying ampullary stenosis or possibly a pancreatic head mass. Reason for consult: pancytopenia, pancreatic lesion    HPI: 62 year old, Male, w/ PMH of ESRD on dialysis (Tues, Thurs, Sat), DM2, CAD s/p MI, CHF, glaucoma, legally blind, HTN, orthostatic hypotension, hyperparathyroid, hyperkalemia, LS spinal stenosis, osteoarthritis, osteoporosis, PAD, hx of osteomyelitis of thoracic vertebrae, remote hx of crack-cocaine use disorder, PSH of BKA of L leg presents with shortness of breath and nausea.  He also reported nausea. He had a CT a/p done that showed Double duct sign, new/increased from the priors, suggests underlying ampullary stenosis or possibly a pancreatic head mass. He was also noted to have pancytopenia, which was not formally worked up in the past. He also had an elevated PTH, significantly elevated BNP.     pt limited historian, denies nausea at this time, feeling SOB though, improved with NRB      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      Osteoarthritis      Vision loss of left eye  blind      ESRD on hemodialysis  T/Th/S      Cataract  both eyes - hx of sx done      BPH (benign prostatic hyperplasia)      UTI (urinary tract infection)  hx of      Bladder mass  hx of      H/O hematuria      Osteoporosis      Vision loss of right eye  decreased      Depression      Chronic GERD      Osteomyelitis of vertebra      CHF (congestive heart failure)      Below knee amputation status, left  2012- pt is wearing prostesis      History of right cataract extraction      History of left cataract extraction      S/P arteriovenous (AV) fistula creation  right arm brachiocephalic arteriovenous fistula on 11/08/2018      H/O hematuria  s/p bladder bx and fulguration 2/25/2020      H/O transurethral destruction of bladder lesion  2020      History of excision of mass  back mass on 03/31/2021          FAMILY HISTORY:  Family history of cirrhosis of liver (Mother)    Family history of renal failure (Sibling)    Family history of hypertension (Sibling)    Family history of diabetes mellitus (Sibling)    History of substance abuse in sibling (Sibling)        Alochol: Denied  Smoking: Nonsmoker  Drug Use: Denied  Marital Status:         Allergies    fish (Rash)  liver (Anaphylaxis)  No Known Drug Allergies    Intolerances        MEDICATIONS  (STANDING):  albuterol    90 MICROgram(s) HFA Inhaler 2 Puff(s) Inhalation every 6 hours  albuterol/ipratropium for Nebulization 3 milliLiter(s) Nebulizer every 6 hours  amLODIPine   Tablet 10 milliGRAM(s) Oral daily  budesonide 160 MICROgram(s)/formoterol 4.5 MICROgram(s) Inhaler 2 Puff(s) Inhalation two times a day  cyanocobalamin 1000 MICROGram(s) Oral daily  epoetin beverley-epbx (RETACRIT) Injectable 68353 Unit(s) IV Push <User Schedule>  ferrous    sulfate 325 milliGRAM(s) Oral daily  folic acid 1 milliGRAM(s) Oral daily  furosemide    Tablet 80 milliGRAM(s) Oral daily  heparin   Injectable 5000 Unit(s) SubCutaneous every 8 hours  hydrALAZINE 25 milliGRAM(s) Oral every 8 hours  insulin lispro (ADMELOG) corrective regimen sliding scale   SubCutaneous every 6 hours  levothyroxine 25 MICROGram(s) Oral daily  lidocaine   4% Patch 1 Patch Transdermal daily  metolazone 10 milliGRAM(s) Oral daily  metoprolol succinate ER 25 milliGRAM(s) Oral daily  ondansetron Injectable 4 milliGRAM(s) IV Push once  pantoprazole  Injectable 40 milliGRAM(s) IV Push every 12 hours  polyethylene glycol 3350 17 Gram(s) Oral daily  senna 2 Tablet(s) Oral at bedtime  simvastatin 40 milliGRAM(s) Oral at bedtime  sodium zirconium cyclosilicate 10 Gram(s) Oral <User Schedule>  sucralfate suspension 1 Gram(s) Oral four times a day  tiotropium 2.5 MICROgram(s) Inhaler 2 Puff(s) Inhalation daily    MEDICATIONS  (PRN):  acetaminophen     Tablet .. 650 milliGRAM(s) Oral every 6 hours PRN Temp greater or equal to 38C (100.4F), Mild Pain (1 - 3)  zolpidem 5 milliGRAM(s) Oral at bedtime PRN Insomnia      ROS  No fever, sweats, chills  No CP, cough, sputum  No abd pain, melena, hematochezia  limited 2/2 participation    T(C): 36.7 (02-23-23 @ 05:18), Max: 37.3 (02-22-23 @ 20:28)  HR: 94 (02-23-23 @ 05:18) (91 - 135)  BP: 147/68 (02-23-23 @ 05:18) (147/68 - 171/85)  RR: 18 (02-23-23 @ 05:18) (18 - 20)  SpO2: 100% (02-23-23 @ 08:46) (92% - 100%)  Wt(kg): --    PE  NAD  Awake, alert  Anicteric  thin  s/l L BKA  limited 2/2 covid pandemic                          8.6    4.38  )-----------( 110      ( 23 Feb 2023 07:05 )             27.5       02-23    138  |  96  |  46<H>  ----------------------------<  160<H>  4.9   |  31  |  12.50<H>    Ca    8.5      23 Feb 2023 07:05  Phos  4.5     02-23  Mg     2.9     02-23    CT a/p: Double duct sign, new/increased from the priors, suggests underlying ampullary stenosis or possibly a pancreatic head mass.

## 2023-02-23 NOTE — PROGRESS NOTE ADULT - PROBLEM SELECTOR PLAN 1
pt presents with new onset nausea, improved with zofran  pt also has h/o gastroparesis  CT abdomen shows double duct sign, new/increased from the priors, suggests underlying ampullary stenosis or possibly a pancreatic head mass  denies bloody vomit    - GI consulted, Dr. Lee  - NPO  - IV PPI BID  - c/w home sucralfate  - PRN zofran  - ?MRCP pending GI recs
pt presents with new onset nausea, improved with zofran  pt also has h/o gastroparesis  CT abdomen shows double duct sign, new/increased from the priors, suggests underlying ampullary stenosis or possibly a pancreatic head mass  denies bloody vomit    - GI consulted, Dr. Rosa GHOTRA, tolerating regular diet  - IV PPI BID  - c/w home sucralfate  - f/u CEA, , and AFP  - PRN zofran  - f/u MRCP

## 2023-02-24 NOTE — PROGRESS NOTE ADULT - SUBJECTIVE AND OBJECTIVE BOX
HPI:  62 year old, Male, w/ PMH of ESRD on dialysis (Tues, Thurs, Sat), DM2, CAD s/p MI, CHF, glaucoma, legally blind, HTN, orthostatic hypotension, hyperparathyroid, hyperkalemia, LS spinal stenosis, osteoarthritis, osteoporosis, PAD, hx of osteomyelitis of thoracic vertebrae, remote hx of crack-cocaine use disorder, PSH of BKA of L leg presents with shortness of breath and nausea. Patient states he has been feeling nauseous and short of breath since yesterday. Reports he has been having intermittent nausea for a few weeks but has gotten worse. Denies any chest pain, palpitaitons, fever, chills, n/v/d, urinary or bowel symptoms. Denies dizziness, weight loss, night sweats, unintentional weight loss.    ED Course:   Vitals /85 P 101 T98.8F R 18 SpO2 100% NRB 8L  Meds: none (21 Feb 2023 09:18)     Pt is seen and examined  pt is awake and lying in bed/out of bed to chair  pt seems comfortable and denies any complaints at this time    ROS:  Negative except for:    MEDICATIONS  (STANDING):  albuterol    90 MICROgram(s) HFA Inhaler 2 Puff(s) Inhalation every 6 hours  albuterol/ipratropium for Nebulization 3 milliLiter(s) Nebulizer every 6 hours  amLODIPine   Tablet 10 milliGRAM(s) Oral daily  budesonide 160 MICROgram(s)/formoterol 4.5 MICROgram(s) Inhaler 2 Puff(s) Inhalation two times a day  chlorhexidine 2% Cloths 1 Application(s) Topical <User Schedule>  cyanocobalamin 1000 MICROGram(s) Oral daily  epoetin beverley-epbx (RETACRIT) Injectable 43027 Unit(s) IV Push <User Schedule>  ferrous    sulfate 325 milliGRAM(s) Oral daily  folic acid 1 milliGRAM(s) Oral daily  furosemide    Tablet 80 milliGRAM(s) Oral daily  heparin   Injectable 5000 Unit(s) SubCutaneous every 8 hours  hydrALAZINE 25 milliGRAM(s) Oral every 8 hours  insulin lispro (ADMELOG) corrective regimen sliding scale   SubCutaneous Before meals and at bedtime  levothyroxine 25 MICROGram(s) Oral daily  lidocaine   4% Patch 1 Patch Transdermal daily  metolazone 10 milliGRAM(s) Oral daily  metoprolol succinate ER 25 milliGRAM(s) Oral daily  ondansetron Injectable 4 milliGRAM(s) IV Push once  pantoprazole  Injectable 40 milliGRAM(s) IV Push every 12 hours  polyethylene glycol 3350 17 Gram(s) Oral daily  senna 2 Tablet(s) Oral at bedtime  simvastatin 40 milliGRAM(s) Oral at bedtime  sodium zirconium cyclosilicate 10 Gram(s) Oral <User Schedule>  sucralfate suspension 1 Gram(s) Oral four times a day  tiotropium 2.5 MICROgram(s) Inhaler 2 Puff(s) Inhalation daily    MEDICATIONS  (PRN):  acetaminophen     Tablet .. 650 milliGRAM(s) Oral every 6 hours PRN Temp greater or equal to 38C (100.4F), Mild Pain (1 - 3)  zolpidem 5 milliGRAM(s) Oral at bedtime PRN Insomnia      Allergies    fish (Rash)  liver (Anaphylaxis)  No Known Drug Allergies    Intolerances        Vital Signs Last 24 Hrs  T(C): 36.4 (24 Feb 2023 05:06), Max: 37.2 (23 Feb 2023 20:51)  T(F): 97.6 (24 Feb 2023 05:06), Max: 98.9 (23 Feb 2023 20:51)  HR: 97 (24 Feb 2023 05:06) (90 - 99)  BP: 154/81 (24 Feb 2023 05:06) (149/77 - 162/94)  BP(mean): --  RR: 18 (24 Feb 2023 05:06) (16 - 18)  SpO2: 99% (24 Feb 2023 05:06) (95% - 100%)    Parameters below as of 24 Feb 2023 05:06  Patient On (Oxygen Delivery Method): mask, nonrebreather  O2 Flow (L/min): 8      PHYSICAL EXAM  General: adult in NAD  HEENT: clear oropharynx, anicteric sclera, pink conjunctiva  Neck: supple  CV: normal S1/S2 with no murmur rubs or gallops  Lungs: positive air movement b/l ant lungs,clear to auscultation, no wheezes, no rales  Abdomen: soft non-tender non-distended, no hepatosplenomegaly  Ext: no clubbing cyanosis or edema  Skin: no rashes and no petechiae  Neuro: alert and oriented X 4, no focal deficits  LABS:                          8.6    4.38  )-----------( 110      ( 23 Feb 2023 07:05 )             27.5         Mean Cell Volume : 93.2 fl  Mean Cell Hemoglobin : 29.2 pg  Mean Cell Hemoglobin Concentration : 31.3 gm/dL  Auto Neutrophil # : x  Auto Lymphocyte # : x  Auto Monocyte # : x  Auto Eosinophil # : x  Auto Basophil # : x  Auto Neutrophil % : x  Auto Lymphocyte % : x  Auto Monocyte % : x  Auto Eosinophil % : x  Auto Basophil % : x    Serial CBC  Hematocrit 27.5  Hemoglobin 8.6  Plat 110  RBC 2.95  WBC 4.38  Serial CBC  Hematocrit 26.4  Hemoglobin 8.2  Plat 87  RBC 2.81  WBC 2.40  Serial CBC  Hematocrit 27.2  Hemoglobin 8.4  Plat 81  RBC 2.87  WBC 3.02    02-23    138  |  96  |  46<H>  ----------------------------<  160<H>  4.9   |  31  |  12.50<H>    Ca    8.5      23 Feb 2023 07:05  Phos  4.5     02-23  Mg     2.9     02-23                      BLOOD SMEAR INTERPRETATION:       RADIOLOGY & ADDITIONAL STUDIES:    
[facility] : Progress Note :: [phone], (office [phone]),   Dr Mosher / Dr Washington / Dr Young / Dr Doll / Dr Varsha TURCIOS / Dr Tello / Dr Garcia / Dr Larry qiu  _____________________________________________________________________________________________  events noted. was short of breath yesterday  for HD today     fish (Rash)  liver (Anaphylaxis)  No Known Drug Allergies    Hospital Medications:   MEDICATIONS  (STANDING):  albuterol    90 MICROgram(s) HFA Inhaler 2 Puff(s) Inhalation every 6 hours  albuterol/ipratropium for Nebulization 3 milliLiter(s) Nebulizer every 6 hours  amLODIPine   Tablet 10 milliGRAM(s) Oral daily  budesonide 160 MICROgram(s)/formoterol 4.5 MICROgram(s) Inhaler 2 Puff(s) Inhalation two times a day  cyanocobalamin 1000 MICROGram(s) Oral daily  epoetin beverley-epbx (RETACRIT) Injectable 23447 Unit(s) IV Push <User Schedule>  ferrous    sulfate 325 milliGRAM(s) Oral daily  folic acid 1 milliGRAM(s) Oral daily  furosemide    Tablet 80 milliGRAM(s) Oral daily  heparin   Injectable 5000 Unit(s) SubCutaneous every 8 hours  hydrALAZINE 25 milliGRAM(s) Oral every 8 hours  insulin lispro (ADMELOG) corrective regimen sliding scale   SubCutaneous every 6 hours  levothyroxine 25 MICROGram(s) Oral daily  lidocaine   4% Patch 1 Patch Transdermal daily  metolazone 10 milliGRAM(s) Oral daily  metoprolol succinate ER 25 milliGRAM(s) Oral daily  ondansetron Injectable 4 milliGRAM(s) IV Push once  pantoprazole  Injectable 40 milliGRAM(s) IV Push every 12 hours  polyethylene glycol 3350 17 Gram(s) Oral daily  senna 2 Tablet(s) Oral at bedtime  simvastatin 40 milliGRAM(s) Oral at bedtime  sodium zirconium cyclosilicate 10 Gram(s) Oral <User Schedule>  sucralfate suspension 1 Gram(s) Oral four times a day  tiotropium 2.5 MICROgram(s) Inhaler 2 Puff(s) Inhalation daily        VITALS:  T(F): 98.1 (02-23-23 @ 05:18), Max: 99.1 (02-22-23 @ 20:28)  HR: 94 (02-23-23 @ 05:18)  BP: 147/68 (02-23-23 @ 05:18)  RR: 18 (02-23-23 @ 05:18)  SpO2: 100% (02-23-23 @ 08:46)  Wt(kg): --      PHYSICAL EXAM:  Constitutional: NAD  HEENT: anicteric sclera, oropharynx clear,  Neck: No JVD  Respiratory: CTAB, no wheezes, rales or rhonchi  Cardiovascular: S1, S2, RRR  Gastrointestinal: BS+, soft, NT/ND  Extremities: No peripheral edema  Neurological: A/O x 3, no focal deficits  Vascular Access: AVF with thrill and bruit     LABS:  02-23    138  |  96  |  46<H>  ----------------------------<  160<H>  4.9   |  31  |  12.50<H>    Ca    8.5      23 Feb 2023 07:05  Phos  4.5     02-23  Mg     2.9     02-23      Creatinine Trend: 12.50 <--, 11.00 <--, 13.20 <--, 12.40 <--                        8.6    4.38  )-----------( 110      ( 23 Feb 2023 07:05 )             27.5     Urine Studies:      RADIOLOGY & ADDITIONAL STUDIES:  
[facility] : Progress Note :: [phone], (office [phone]),   Dr Mosher / Dr Washington / Dr Young / Dr Doll / Dr Varsha TURCIOS / Dr Tello / Dr Garcia / Dr Larry qiu  ____________________________________________________________________________________________    did not complete HD yesterday     fish (Rash)  liver (Anaphylaxis)  No Known Drug Allergies    Hospital Medications:   MEDICATIONS  (STANDING):  albuterol    90 MICROgram(s) HFA Inhaler 2 Puff(s) Inhalation every 6 hours  amLODIPine   Tablet 10 milliGRAM(s) Oral daily  budesonide 160 MICROgram(s)/formoterol 4.5 MICROgram(s) Inhaler 2 Puff(s) Inhalation two times a day  cyanocobalamin 1000 MICROGram(s) Oral daily  epoetin bevreley-epbx (RETACRIT) Injectable 15741 Unit(s) IV Push <User Schedule>  ferrous    sulfate 325 milliGRAM(s) Oral daily  folic acid 1 milliGRAM(s) Oral daily  furosemide    Tablet 80 milliGRAM(s) Oral daily  heparin   Injectable 5000 Unit(s) SubCutaneous every 8 hours  hydrALAZINE 25 milliGRAM(s) Oral every 8 hours  insulin lispro (ADMELOG) corrective regimen sliding scale   SubCutaneous every 6 hours  levothyroxine 25 MICROGram(s) Oral daily  lidocaine   4% Patch 1 Patch Transdermal daily  metolazone 10 milliGRAM(s) Oral daily  metoprolol succinate ER 25 milliGRAM(s) Oral daily  ondansetron Injectable 4 milliGRAM(s) IV Push once  pantoprazole  Injectable 40 milliGRAM(s) IV Push every 12 hours  polyethylene glycol 3350 17 Gram(s) Oral daily  senna 2 Tablet(s) Oral at bedtime  simvastatin 40 milliGRAM(s) Oral at bedtime  sodium zirconium cyclosilicate 10 Gram(s) Oral <User Schedule>  sucralfate suspension 1 Gram(s) Oral four times a day  tiotropium 2.5 MICROgram(s) Inhaler 2 Puff(s) Inhalation daily        VITALS:  T(F): 98.9 (02-22-23 @ 13:39), Max: 98.9 (02-21-23 @ 19:45)  HR: 91 (02-22-23 @ 13:39)  BP: 171/85 (02-22-23 @ 13:39)  RR: 18 (02-22-23 @ 13:39)  SpO2: 100% (02-22-23 @ 13:39)  Wt(kg): --      PHYSICAL EXAM:  Constitutional: NAD  HEENT: anicteric sclera, oropharynx clear  Neck: No JVD  Respiratory: CTAB, no wheezes, rales or rhonchi  Cardiovascular: S1, S2, RRR  Gastrointestinal: BS+, soft, NT/ND  Extremities:  No peripheral edema  Neurological: A/O x 3, no focal deficits  Vascular Access: AVF with thrill and bruit     LABS:  02-22    138  |  97  |  43<H>  ----------------------------<  105<H>  4.3   |  31  |  11.00<H>    Ca    8.5      22 Feb 2023 10:30  Phos  5.0     02-22  Mg     2.7     02-22    TPro  6.4  /  Alb  2.9<L>  /  TBili  0.5  /  DBili      /  AST  36  /  ALT  21  /  AlkPhos  58  02-21    Creatinine Trend: 11.00 <--, 13.20 <--, 12.40 <--                        8.2    2.40  )-----------( 87       ( 22 Feb 2023 10:30 )             26.4     Urine Studies:      RADIOLOGY & ADDITIONAL STUDIES:  
PGY-1 Progress Note discussed with attending    PAGER #: [[phone]] TILL 5:00 PM  PLEASE CONTACT ON CALL TEAM:  - On Call Team (Please refer to Dejon) FROM 5:00 PM - 8:30PM  - Nightfloat Team FROM 8:30 -7:30 AM    OVERNIGHT EVENTS:   - No acute overnight events. Pt seen at bedside, NAD, pt complaining of constipation. Denies fevers, chills, CP, SOB, N/V/D, abdominal pain, dysuria, numbness/tingling/weakness in extremities.      REVIEW OF SYSTEMS:  CONSTITUTIONAL: No fever, weight loss, or fatigue  RESPIRATORY: No cough, wheezing, chills or hemoptysis; No shortness of breath  CARDIOVASCULAR: No chest pain, palpitations, dizziness, or leg swelling  GASTROINTESTINAL: (+) Constipation No abdominal pain. No nausea, vomiting, or hematemesis; No diarrhea. No melena or hematochezia.  GENITOURINARY: No dysuria or hematuria, urinary frequency  NEUROLOGICAL: No headaches, memory loss, loss of strength, numbness, or tremors  SKIN: No itching, burning, rashes, or lesions     MEDICATIONS  (STANDING):  albuterol    90 MICROgram(s) HFA Inhaler 2 Puff(s) Inhalation every 6 hours  amLODIPine   Tablet 10 milliGRAM(s) Oral daily  budesonide 160 MICROgram(s)/formoterol 4.5 MICROgram(s) Inhaler 2 Puff(s) Inhalation two times a day  cyanocobalamin 1000 MICROGram(s) Oral daily  epoetin beverley-epbx (RETACRIT) Injectable 95595 Unit(s) IV Push <User Schedule>  ferrous    sulfate 325 milliGRAM(s) Oral daily  folic acid 1 milliGRAM(s) Oral daily  furosemide    Tablet 80 milliGRAM(s) Oral daily  heparin   Injectable 5000 Unit(s) SubCutaneous every 8 hours  hydrALAZINE 25 milliGRAM(s) Oral every 8 hours  insulin lispro (ADMELOG) corrective regimen sliding scale   SubCutaneous every 6 hours  levothyroxine 25 MICROGram(s) Oral daily  lidocaine   4% Patch 1 Patch Transdermal daily  metolazone 10 milliGRAM(s) Oral daily  metoprolol succinate ER 25 milliGRAM(s) Oral daily  ondansetron Injectable 4 milliGRAM(s) IV Push once  pantoprazole  Injectable 40 milliGRAM(s) IV Push every 12 hours  polyethylene glycol 3350 17 Gram(s) Oral daily  senna 2 Tablet(s) Oral at bedtime  simvastatin 40 milliGRAM(s) Oral at bedtime  sodium zirconium cyclosilicate 10 Gram(s) Oral <User Schedule>  sucralfate suspension 1 Gram(s) Oral four times a day  tiotropium 2.5 MICROgram(s) Inhaler 2 Puff(s) Inhalation daily    MEDICATIONS  (PRN):  acetaminophen     Tablet .. 650 milliGRAM(s) Oral every 6 hours PRN Temp greater or equal to 38C (100.4F), Mild Pain (1 - 3)  zolpidem 5 milliGRAM(s) Oral at bedtime PRN Insomnia      Vital Signs Last 24 Hrs  T(C): 37.1 (22 Feb 2023 05:23), Max: 37.2 (21 Feb 2023 15:30)  T(F): 98.8 (22 Feb 2023 05:23), Max: 98.9 (21 Feb 2023 15:30)  HR: 90 (22 Feb 2023 10:29) (90 - 105)  BP: 171/82 (22 Feb 2023 10:29) (153/75 - 180/92)  BP(mean): --  RR: 18 (22 Feb 2023 10:29) (15 - 18)  SpO2: 92% (22 Feb 2023 10:29) (91% - 100%)    Parameters below as of 22 Feb 2023 10:29  Patient On (Oxygen Delivery Method): room air        PHYSICAL EXAMINATION:  GENERAL: NAD, lying in bed comfortably, AAOx3  HEAD:  Atraumatic, Normocephalic  EYES: EOMI, PERRLA, conjunctiva and sclera clear, L cataract  ENT: Moist mucous membranes  NECK: Supple, No JVD  CHEST/LUNG: Clear to auscultation bilaterally  HEART: Regular rate and rhythm; No murmurs, rubs, or gallops  ABDOMEN: Bowel sounds present; Soft, Nontender, Nondistended. No hepatomegaly  EXTREMITIES:  2+ Peripheral Pulses, brisk capillary refill. No clubbing, cyanosis, or edema. s/p L BKA  NERVOUS SYSTEM:  Alert & Oriented X3, speech clear. No deficits   SKIN: No rashes or                           8.2    2.40  )-----------( 87       ( 22 Feb 2023 10:30 )             26.4     02-21    139  |  93<L>  |  60<H>  ----------------------------<  77  4.8   |  28  |  13.20<H>    Ca    9.0      21 Feb 2023 10:46  Phos  5.5     02-21    TPro  6.4  /  Alb  2.9<L>  /  TBili  0.5  /  DBili  x   /  AST  36  /  ALT  21  /  AlkPhos  58  02-21    LIVER FUNCTIONS - ( 21 Feb 2023 00:11 )  Alb: 2.9 g/dL / Pro: 6.4 g/dL / ALK PHOS: 58 U/L / ALT: 21 U/L DA / AST: 36 U/L / GGT: x               PT/INR - ( 21 Feb 2023 00:11 )   PT: 12.2 sec;   INR: 1.02 ratio         PTT - ( 21 Feb 2023 00:11 )  PTT:28.4 sec  SARS-CoV-2: NotDetec (09 Jan 2023 21:21)  COVID-19 PCR: NotDetec (19 Nov 2022 08:43)  COVID-19 PCR: NotDetec (17 Nov 2022 19:45)  COVID-19 PCR: Detected (12 Nov 2022 14:00)  COVID-19 PCR: NotDetec (05 Oct 2022 14:09)  COVID-19 PCR: NotDetec (03 Oct 2022 16:30)      CAPILLARY BLOOD GLUCOSE      POCT Blood Glucose.: 106 mg/dL (22 Feb 2023 08:08)  POCT Blood Glucose.: 99 mg/dL (22 Feb 2023 06:22)  POCT Blood Glucose.: 115 mg/dL (22 Feb 2023 00:08)  POCT Blood Glucose.: 102 mg/dL (21 Feb 2023 21:57)  POCT Blood Glucose.: 106 mg/dL (21 Feb 2023 15:13)      RADIOLOGY & ADDITIONAL TESTS:    < from: CT Abdomen and Pelvis No Cont (02.21.23 @ 04:57) >    1. Double duct sign, new/increased from the priors, suggests underlying   ampullary stenosis or possibly a pancreatic head mass.  2. Right basilar atelectasis or pneumonia with small right pleural   effusion.        < end of copied text >  < from: Xray Chest 1 View- PORTABLE-Urgent (Xray Chest 1 View- PORTABLE-Urgent .) (02.21.23 @ 01:38) >  IMPRESSION: Heart enlargement. Increasing CHF.    < end of copied text >                  
PGY-1 Progress Note discussed with attending    PAGER #: [[phone]] TILL 5:00 PM  PLEASE CONTACT ON CALL TEAM:  - On Call Team (Please refer to Dejon) FROM 5:00 PM - 8:30PM  - Nightfloat Team FROM 8:30 -7:30 AM    OVERNIGHT EVENTS:   - Pt noted to be tachycardic and using accessory muscles for breathing this morning, improved after duonebs. Pt seen at bedside, NAD, no acute complaints, pt reports that he was able to tolerate regular diet last night. Denies fevers, chills, CP, SOB, N/V/D, abdominal pain, numbness/tingling/weakness in extremities.      REVIEW OF SYSTEMS:  CONSTITUTIONAL: No fever, weight loss, or fatigue  RESPIRATORY: No cough, wheezing, chills or hemoptysis; No shortness of breath  CARDIOVASCULAR: No chest pain, palpitations, dizziness, or leg swelling  GASTROINTESTINAL: No abdominal pain. No nausea, vomiting, or hematemesis; No diarrhea or constipation. No melena or hematochezia.  GENITOURINARY: No dysuria or hematuria, urinary frequency  NEUROLOGICAL: No headaches, memory loss, loss of strength, numbness, or tremors  SKIN: No itching, burning, rashes, or lesions     MEDICATIONS  (STANDING):  albuterol    90 MICROgram(s) HFA Inhaler 2 Puff(s) Inhalation every 6 hours  albuterol/ipratropium for Nebulization 3 milliLiter(s) Nebulizer every 6 hours  amLODIPine   Tablet 10 milliGRAM(s) Oral daily  budesonide 160 MICROgram(s)/formoterol 4.5 MICROgram(s) Inhaler 2 Puff(s) Inhalation two times a day  cyanocobalamin 1000 MICROGram(s) Oral daily  epoetin beverley-epbx (RETACRIT) Injectable 77906 Unit(s) IV Push <User Schedule>  ferrous    sulfate 325 milliGRAM(s) Oral daily  folic acid 1 milliGRAM(s) Oral daily  furosemide    Tablet 80 milliGRAM(s) Oral daily  heparin   Injectable 5000 Unit(s) SubCutaneous every 8 hours  hydrALAZINE 25 milliGRAM(s) Oral every 8 hours  insulin lispro (ADMELOG) corrective regimen sliding scale   SubCutaneous every 6 hours  levothyroxine 25 MICROGram(s) Oral daily  lidocaine   4% Patch 1 Patch Transdermal daily  metolazone 10 milliGRAM(s) Oral daily  metoprolol succinate ER 25 milliGRAM(s) Oral daily  ondansetron Injectable 4 milliGRAM(s) IV Push once  pantoprazole  Injectable 40 milliGRAM(s) IV Push every 12 hours  polyethylene glycol 3350 17 Gram(s) Oral daily  senna 2 Tablet(s) Oral at bedtime  simvastatin 40 milliGRAM(s) Oral at bedtime  sodium zirconium cyclosilicate 10 Gram(s) Oral <User Schedule>  sucralfate suspension 1 Gram(s) Oral four times a day  tiotropium 2.5 MICROgram(s) Inhaler 2 Puff(s) Inhalation daily    MEDICATIONS  (PRN):  acetaminophen     Tablet .. 650 milliGRAM(s) Oral every 6 hours PRN Temp greater or equal to 38C (100.4F), Mild Pain (1 - 3)  zolpidem 5 milliGRAM(s) Oral at bedtime PRN Insomnia      Vital Signs Last 24 Hrs  T(C): 36.7 (23 Feb 2023 05:18), Max: 37.3 (22 Feb 2023 20:28)  T(F): 98.1 (23 Feb 2023 05:18), Max: 99.1 (22 Feb 2023 20:28)  HR: 94 (23 Feb 2023 05:18) (91 - 135)  BP: 147/68 (23 Feb 2023 05:18) (147/68 - 171/85)  BP(mean): --  RR: 18 (23 Feb 2023 05:18) (18 - 20)  SpO2: 100% (23 Feb 2023 08:46) (92% - 100%)    Parameters below as of 23 Feb 2023 08:46  Patient On (Oxygen Delivery Method): mask, nonrebreather  O2 Flow (L/min): 10      PHYSICAL EXAMINATION:  GENERAL: NAD, lying in bed comfortably, AAOx3  HEAD:  Atraumatic, Normocephalic  EYES: EOMI, PERRLA, conjunctiva and sclera clear, L cataract  ENT: Moist mucous membranes  NECK: Supple, No JVD  CHEST/LUNG: Clear to auscultation bilaterally  HEART: Regular rate and rhythm; No murmurs, rubs, or gallops  ABDOMEN: Bowel sounds present; Soft, Nontender, Nondistended. No hepatomegaly  EXTREMITIES:  2+ Peripheral Pulses, brisk capillary refill. No clubbing, cyanosis, or edema. s/p L BKA  NERVOUS SYSTEM:  Alert & Oriented X3, speech clear. No deficits   SKIN: No rashes                           8.6    4.38  )-----------( 110      ( 23 Feb 2023 07:05 )             27.5     02-23    138  |  96  |  46<H>  ----------------------------<  160<H>  4.9   |  31  |  12.50<H>    Ca    8.5      23 Feb 2023 07:05  Phos  4.5     02-23  Mg     2.9     02-23              SARS-CoV-2: NotDetec (09 Jan 2023 21:21)  COVID-19 PCR: NotDetec (19 Nov 2022 08:43)  COVID-19 PCR: NotDetec (17 Nov 2022 19:45)  COVID-19 PCR: Detected (12 Nov 2022 14:00)  COVID-19 PCR: NotDetec (05 Oct 2022 14:09)  COVID-19 PCR: NotDetec (03 Oct 2022 16:30)      CAPILLARY BLOOD GLUCOSE      POCT Blood Glucose.: 154 mg/dL (23 Feb 2023 11:10)  POCT Blood Glucose.: 129 mg/dL (23 Feb 2023 00:35)  POCT Blood Glucose.: 143 mg/dL (22 Feb 2023 18:36)  POCT Blood Glucose.: 103 mg/dL (22 Feb 2023 11:53)      RADIOLOGY & ADDITIONAL TESTS:

## 2023-02-24 NOTE — DISCHARGE NOTE PROVIDER - HOSPITAL COURSE
62 year old, Male, w/ PMH of ESRD on dialysis (Tues, Thurs, Sat), DM2, CAD s/p MI, CHF, glaucoma, legally blind, HTN, orthostatic hypotension, hyperparathyroid, hyperkalemia, LS spinal stenosis, osteoarthritis, osteoporosis, PAD, hx of osteomyelitis of thoracic vertebrae, remote hx of crack-cocaine use disorder, PSH of BKA of L leg presents with shortness of breath and nausea admitted for c/o pancreatic head cancer. CT abdomen and pelvis showed double duct sign, new/increase from the prior scans, suggestive of underlying ampullary stenosis or possibly a pancreatic head mass. Diet was advanced as tolerated, now on regular diet. GI Dr. Lee was consulted. MRCP showed dilated CBD, with no gross pancreatic mass. GI to follow up for EUS outpatient. CEA elevated to 9.4,  and AFP WNL. Pt has a history of  CHF, takes lasix and metalozone at home which were continued during his hospital stay. Pt was noted to be on 10L NRB, but was weaned off  to room air, saturating well. Pt has a history       Problem/Plan - 2:  ·  Problem: CHF (congestive heart failure).   ·  Plan: pt has h/o CHF now with acute hypoxic respiratory failure  takes lasix and metolazone    - c/w home meds  - wean oxygen as tolerated  - telemonitoring  - daily weights, strict I&O.     Problem/Plan - 3:  ·  Problem: ESRD on dialysis.   ·  Plan: pt has history of ESRD on HD TTS    - Nephro consulted, Dr. Mosher  - c/w HD as per schedule  - wean oxygen as tolerated  - next HD session today 2/22.     Problem/Plan - 4:  ·  Problem: Pancytopenia.   ·  Plan: Noted to have pancytopenia on admission and h/o of pancytopenia  did not have prior w/u  Heme/onc Dr. Gonzales consulted, f/u recs.     Problem/Plan - 5:  ·  Problem: Diabetes mellitus.   ·  Plan: pt takes insulin as needed  was hypoglycemic in ED, improved s/p d50    - iss q6 while NPO  - fs q6h     (2/22).     Problem/Plan - 6:  ·  Problem: Hypertension.   ·  Plan: pt takes amlodipine 10 mg qd, hydralazine 25 mg tid, toprol XL 25 mg qd    - c/w home meds with parameters.     Problem/Plan - 7:  ·  Problem: HLD (hyperlipidemia).   ·  Plan: pt takes simvastatin 10 mg qhs    - c/w home meds.     Problem/Plan - 8:  ·  Problem: Hypothyroidism.   ·  Plan: pt takes levothyroxine 25 mcg qam    - c/w home meds.     Problem/Plan - 9:  ·  Problem: Preventive measure.   ·  Plan: dvt ppx with heparin SQ. 62 year old, Male, w/ PMH of ESRD on dialysis (Tues, Thurs, Sat), DM2, CAD s/p MI, CHF, glaucoma, legally blind, HTN, orthostatic hypotension, hyperparathyroid, hyperkalemia, LS spinal stenosis, osteoarthritis, osteoporosis, PAD, hx of osteomyelitis of thoracic vertebrae, remote hx of crack-cocaine use disorder, PSH of BKA of L leg presents with shortness of breath and nausea admitted for c/o pancreatic head cancer. CT abdomen and pelvis showed double duct sign, new/increase from the prior scans, suggestive of underlying ampullary stenosis or possibly a pancreatic head mass. Diet was advanced as tolerated, now on regular diet. GI Dr. Lee was consulted. MRCP showed dilated CBD, with no gross pancreatic mass. GI to follow up for EUS outpatient. CEA elevated to 9.4,  and AFP WNL. Pt has a history of  CHF, takes lasix and metalozone at home which were continued during his hospital stay. Pt was noted to be on 10L NRB, refusing nasal cannula, though he is saturating 89-90% on RA. PT also as a history of ESRD on dialysis (TTS), HD was continued as per schedule, last HD session was 2/23. Pt has history of pancytopenia, did not have any prior workup. Hematology/Oncology Dr. Gonzales was consulted. Pt has a history of DM, takes insulin as needed, was noted to be hypoglycemic in the ED, improved with D50, blood sugars were well controlled while inpatient with insulin sliding scale. PT has a history of HTN, takes amlodipine, hydralazine and toprol, home meds were continued during his hospital stay. Pt has a history of HLD, takes simvastatin at home which was continued. Pt has a history of hypothyroidism, takes levothyroxine, which was continued. Pt deemed medically stable for discharged.

## 2023-02-24 NOTE — DISCHARGE NOTE NURSING/CASE MANAGEMENT/SOCIAL WORK - PATIENT PORTAL LINK FT
You can access the FollowMyHealth Patient Portal offered by [facility] by registering at the following website: [email or web address]. By joining [employer]’s FollowMyHealth portal, you will also be able to view your health information using other applications (apps) compatible with our system.

## 2023-02-24 NOTE — PROGRESS NOTE ADULT - ASSESSMENT
62 year old, Male, w/ PMH of ESRD on dialysis (Tues, Thurs, Sat), DM2, CAD s/p MI, CHF, glaucoma, legally blind, HTN, orthostatic hypotension, hyperparathyroid, hyperkalemia, LS spinal stenosis, osteoarthritis, osteoporosis, PAD, hx of osteomyelitis of thoracic vertebrae, remote hx of crack-cocaine use disorder, PSH of BKA of L leg presents with shortness of breath and nausea admitted for c/o pancreatic head cancer
Patient is a 62y Male whom presented to the hospital with SOB. Admitted in this hospital earlier this week with COVID 19.  He did not complete HD treatment on Thursday. Presented to ED with SOB.  Renal consulted for HD     # ESRD. admitted with SOB. s/p HD earlier in the day . Cut  HD yesterday  advised compliance with HD   HD in AM   # anemia of CKD. epogen on HD   # CT abdomen noted with double  duct sign? head of pancreas mass-   would avoid GADOLINIUM SEC TO RISK OF NSF. PATIENT WILL REQUIRE HIGH EFFICIENCY FREQUENT HD BUT IS NON-COMPLIANT AND THUS HIGH RISK AS WILL NOT AGREE TO RECOMMENDED DIALYSIS REGIMEN 
Patient is a 62y Male whom presented to the hospital with SOB. Admitted in this hospital earlier this week with COVID 19.  He did not complete HD treatment on Thursday. Presented to ED with SOB.  Renal consulted for HD     # ESRD. admitted with SOB. not completing HD treatments   HD today  SOB  advised compliance with HD as prescribed     # anemia of CKD. epogen on HD   # CT abdomen noted with double  duct sign? head of pancreas mass-   would avoid GADOLINIUM SEC TO RISK OF NSF. PATIENT WILL REQUIRE HIGH EFFICIENCY FREQUENT HD BUT IS NON-COMPLIANT AND THUS HIGH RISK AS WILL NOT AGREE TO RECOMMENDED DIALYSIS REGIMEN 
· Assessment      pancytopenia --  differential is white and multifactorial, certainly he may have anemia chronic disease in the setting of end-stage renal disease, his WBC has remained mostly stable, in the normal range today.  May also be reactive  -- complete evaluation, check B12, folate, ferritin, iron panel, SPEP, immunofixation, haptoglobin, stool guaiac, retic count  -- counts are adequate, monitor for now    pancreatic lesion ?  CEA 9, Ca 19.9 neg  MRCP neg for mass  need EUS    no more Nausea.  appetite is good     SOb -- per primary team    ESRD -- HD per renal    Will follow, d/w pt    
62 year old, Male, w/ PMH of ESRD on dialysis (Tues, Thurs, Sat), DM2, CAD s/p MI, CHF, glaucoma, legally blind, HTN, orthostatic hypotension, hyperparathyroid, hyperkalemia, LS spinal stenosis, osteoarthritis, osteoporosis, PAD, hx of osteomyelitis of thoracic vertebrae, remote hx of crack-cocaine use disorder, PSH of BKA of L leg presents with shortness of breath and nausea admitted for c/o pancreatic head cancer

## 2023-02-24 NOTE — DISCHARGE NOTE PROVIDER - CARE PROVIDER_API CALL
Mike Lee)  Gastroenterology; Internal Medicine  [address] [facility] [address] [place] [address]  [place] [address]  Phone: [phone]  Fax: [phone]  Follow Up Time:     Gilberto Gonzales  INTERNAL MEDICINE  [address] [place] [address]  Phone: [phone]  Fax: [phone]  Follow Up Time:

## 2023-02-24 NOTE — DISCHARGE NOTE NURSING/CASE MANAGEMENT/SOCIAL WORK - NSDCPEFALRISK_GEN_ALL_CORE
For information on Fall & Injury Prevention, visit: [email or web address] OR  [email or web address] OR  https://www.cdc.gov/steadi/patient.html

## 2023-02-24 NOTE — DISCHARGE NOTE PROVIDER - NSDCMRMEDTOKEN_GEN_ALL_CORE_FT
acetaminophen 325 mg oral tablet: 2 tab(s) orally every 6 hours, As needed, Temp greater or equal to 38C (100.4F), Mild Pain (1 - 3)  ALPRAZolam 0.25 mg oral tablet: 1 tab(s) orally once a day (at bedtime)  amLODIPine 10 mg oral tablet: 1 tab(s) orally once a day  budesonide-formoterol 160 mcg-4.5 mcg/inh inhalation aerosol: 2 puff(s) inhaled 2 times a day  calcium carbonate 500 mg (200 mg elemental calcium) oral tablet, chewable: 1 tab(s) orally 2 times a day  cholecalciferol oral tablet: 1000 unit(s) orally once a day  cyanocobalamin 1000 mcg oral tablet: 1 tab(s) orally once a day  diphenhydrAMINE 25 mg oral capsule: 1 cap(s) orally once a day (at bedtime), As needed, Rash and/or Itching  epoetin beverley: 4000 unit(s) intravenous Tuesday, Thursday, Saturday  ferrous sulfate 325 mg (65 mg elemental iron) oral tablet: 1 tab(s) orally once a day  folic acid 1 mg oral tablet: 1 tab(s) orally once a day  furosemide 80 mg oral tablet: 1 tab(s) orally once a day  hydrALAZINE 25 mg oral tablet: 1 tab(s) orally 3 times a day  insulin lispro 100 units/mL injectable solution: 1 unit(s) injectable 3 times a day (before meals)  1 Unit(s) if Glucose 151 - 200  2 Unit(s) if Glucose 201 - 250  3 Unit(s) if Glucose 251 - 300  4 Unit(s) if Glucose 301 - 350  5 Unit(s) if Glucose 351 - 400  6 Unit(s) if Glucose Greater Than 400  Subcutaneous three times a day before meals     insulin lispro 100 units/mL injectable solution: 0 unit(s) injectable once a day (at bedtime)  0 Unit(s) if Glucose 0 - 250  1 Unit(s) if Glucose 251 - 300  2 Unit(s) if Glucose 301 - 350  3 Unit(s) if Glucose 351 - 400  4 Unit(s) if Glucose Greater Than 400  Subcutaneous at bedtime     ipratropium-albuterol 0.5 mg-2.5 mg/3 mL inhalation solution: 3 milliliter(s) inhaled every 8 hours  levothyroxine 25 mcg (0.025 mg) oral tablet: 1 tab(s) orally once a day  lidocaine 4% patch: 1 patch transdermal once a day  Lokelma 10 g oral powder for reconstitution: 1 packet(s) orally once a day every Sun/ Mon/ Wed/ Fri  metoclopramide 10 mg oral tablet: 1 tab(s) orally 3 times a day  metOLazone 10 mg oral tablet: 1 tab(s) orally once a day  metoprolol succinate 25 mg oral tablet, extended release: 1 tab(s) orally once a day  nicotine 21 mg/24 hr transdermal film, extended release: 1 patch transdermal once a day  ondansetron 2 mg/mL injectable solution: 4 milligram(s) injectable every 8 hours, As Neededfor nausea and/or vomiting   oxyCODONE 5 mg oral tablet: 1 tab(s) orally every 8 hours, As needed, Severe Pain (7 - 10)  pantoprazole 40 mg oral delayed release tablet: 1 tab(s) orally 2 times a day  Polina-Dior oral tablet: 1 tab(s) orally once a day  simvastatin 40 mg oral tablet: 1 tab(s) orally once a day (at bedtime)  sucralfate 1 g/10 mL oral suspension: 10 milliliter(s) orally 4 times a day  zolpidem 5 mg oral tablet: 1 tab(s) orally once a day (at bedtime), As needed, Insomnia

## 2023-02-24 NOTE — DISCHARGE NOTE PROVIDER - NSDCCPCAREPLAN_GEN_ALL_CORE_FT
PRINCIPAL DISCHARGE DIAGNOSIS  Diagnosis: Nausea  Assessment and Plan of Treatment: You presented to the hospital due to nausea and vomiting. You were evaluated in the ED, and CT scan of your abdomen showed possible blockage or pancreatic mass. GI Dr. Lee was consulted. MRI of yoru abdomen showed a dilated bile duct, but no gross pancreatic mass. Your nausea and vomiting symtoms resolved and your diet was able to be advanced to a regular diet. You will need to follow up outpatient with Dr. Lee for further management of the imaging results. Please follow up with you primary care doctor and Dr. Lee within 1-2 weeks after you are discharged.      SECONDARY DISCHARGE DIAGNOSES  Diagnosis: ESRD on dialysis  Assessment and Plan of Treatment: You have a history of kidney disease and you have dialysis sessions on Tuesday, Thursday, and Saturdays. You were seen Nephrologist Dr. Mosher and your dialysis sessions were continued on your normal schedule during your hospital stay. Please continue going to your dialysis sessions when you are discharged from the hospital and follow up with your primary care doctor and nephrologist within 1-2 weeks after you are discharged.    Diagnosis: Pancytopenia  Assessment and Plan of Treatment: You have a history of low blood counts, however, never had a prior work up for the cause. Hematology/Oncology Dr. Gonzales was consulted. Please follow up with Dr. Gonzales when you are discharged from the hospital and follow up with your primary care doctor in 1-2 weeks after you are discharged.    Diagnosis: Diabetes mellitus  Assessment and Plan of Treatment: You have a history of diabetes and use insulin at home to control you blood sugars. Your blood sugars were noted to be low in the ED, and you were given dextrose to normalize your blood sugar. During your hospital stay, your blood sugar was controlled with insulin. Please continue your insulin regimen when you are discharged from the hospital and follow up with your primary care doctor in 1-2 weeks after you are discharged.    Diagnosis: Hypertension  Assessment and Plan of Treatment: You have a history of high blood pressure and you take amlodipine, hydralazine and toprol at home to control you blood pressure. You were continued on these medications during your hospital course and your blood pressure has been well controlled. Please continue taking these medications and follow up with your primary care doctor in 1-2 weeks after you are dicharged from the hospital.    Diagnosis: HLD (hyperlipidemia)  Assessment and Plan of Treatment: You have a history of high cholesteorl and you take simvastatin at home. You were continued on this medication during your hospital course. Please continue ced simvastatin everyday ad follow up with your primary care doctor in 1-2 weeks.    Diagnosis: CHF (congestive heart failure)  Assessment and Plan of Treatment: You have a history of heart failure and you take lasix and metolazone. You were continued on your home medicatoins. You did not require any oxygen supplementation however, you preferred having the non-rebreather mask for oxygen and did not want to use the nasal cannula. Please follow up with your primary care doctor in 1-2 weeks after you are discharged.

## 2023-02-24 NOTE — DISCHARGE NOTE PROVIDER - NSDCFUSCHEDAPPT_GEN_ALL_CORE_FT
[facility]  GASTRO OP [id number]  Scheduled Appointment: 03/23/2023    [facility]  VASCULAR [address]  Scheduled Appointment: 04/10/2023    Ambrocio Mason  [facility]  VASCULAR [address]  Scheduled Appointment: 04/10/2023

## 2023-02-27 NOTE — H&P PST ADULT - NSANTHOSAYNRD_GEN_A_CORE
Moderate to severe acute illness with or without fever. Delay administration of vaccination until patient has been afebrile or illness resolved for 24hrs 94 No. RASHIDA screening performed.  STOP BANG Legend: 0-2 = LOW Risk; 3-4 = INTERMEDIATE Risk; 5-8 = HIGH Risk

## 2023-03-14 NOTE — ED PROVIDER NOTE - PATIENT PORTAL LINK FT
You can access the FollowMyHealth Patient Portal offered by [facility] by registering at the following website: [email or web address]. By joining [email or web address]’s FollowMyHealth portal, you will also be able to view your health information using other applications (apps) compatible with our system.

## 2023-03-14 NOTE — ED PROVIDER NOTE - OBJECTIVE STATEMENT
62-year-old male history of end-stage renal disease on hemodialysis (Tuesday Thursday Friday).  Patient states received hemodialysis earlier today.  On evaluation patient stated that he vomited however denies any abdominal pain, no chest pain, patient also denies shortness of breath.  Patient noted to have pulse ox of 88% on room air, on 100% nonrebreather pulse ox increases to 99%.  No reported fevers.

## 2023-03-14 NOTE — ED PROVIDER NOTE - PHYSICAL EXAMINATION
No distress  Left arm AV fistula (dressing applied s/p HD today) No distress  Left arm AV fistula (dressing applied s/p HD today)  Left BKA

## 2023-03-14 NOTE — ED PROVIDER NOTE - CARE PROVIDER_API CALL
Felicia De La Torre)  Internal Medicine  [address] [place] [address]  Phone: [phone]  Fax: [phone]  Follow Up Time:

## 2023-03-14 NOTE — ED PROVIDER NOTE - CLINICAL SUMMARY MEDICAL DECISION MAKING FREE TEXT BOX
Patient had no episodes of vomiting in ER, no abdominal pain, denies chest pain, no shortness of breath.  Patient drank fluids no vomiting and patient states he had flatulence and bowel movements.  Patient is very eager to be discharged Labs reviewed with PMD Dr. De La Torre patient with baseline BNP which is elevated however there is no evidence of fluid overload on chest x-ray and patient is compliant with hemodialysis.  Plan is to repeat troponin and if elevated compared to first set patient to be admitted otherwise discharged back to [facility]. Patient had no episodes of vomiting in ER, no abdominal pain, denies chest pain, no shortness of breath.  Patient drank fluids no vomiting and patient states he had flatulence and bowel movements.  Patient is very eager to be discharged Labs reviewed with PMD Dr. De La Torre patient with baseline BNP which is elevated however there is no evidence of fluid overload on chest x-ray and patient is compliant with hemodialysis.  Plan is to repeat troponin and if elevated compared to first set patient to be admitted otherwise discharged back to [facility].  734a repeat trop decreased to 444.6 as discussed with PMD pt to be discharged back to NH.  Safe transport arranged for pt.  Pt educated on care and need for follow up. Discussed anticipatory guidance and return precautions. Questions answered. I had a detailed discussion with the patient regarding the historical points, exam findings, and any diagnostic results supporting the discharge diagnosis.

## 2023-03-14 NOTE — ED PROVIDER NOTE - NSFOLLOWUPINSTRUCTIONS_ED_ALL_ED_FT
Return to ER immediately if feeling short of breath, having any pain, fever, coughing worsens, vomiting, weakness, any concerns. Take medications as instructed if they were prescribed.  See your primary doctor as soon as possible (1-2 days). If you need assistance with follow up appointment, you can contact our Care Coordinator at [phone].

## 2023-03-15 NOTE — H&P ADULT - PROBLEM SELECTOR PLAN 3
ESRD  on HD TTS at [facility], last dialysed yesterday with UF of 1.6 kilos  Will get HD tmmrw   Dr. Mosher following

## 2023-03-15 NOTE — H&P ADULT - HISTORY OF PRESENT ILLNESS
62 year old, Male, w/ PMH of ESRD on dialysis (Tues, Thurs, Sat), DM2, CAD s/p MI, CHF, glaucoma, legally blind, HTN, orthostatic hypotension, hyperparathyroid, hyperkalemia, LS spinal stenosis, osteoarthritis, osteoporosis, PAD, hx of osteomyelitis of thoracic vertebrae, remote hx of crack-cocaine use disorder, PSH of BKA of L leg presents after being found hypoxic at the nursing home. Per chart review, pt was found hypoxic to 87% on RA. Patient states he currently has no complaints and denies any fever, chills, nausea, vomiting, chest pain, SOB, abdominal pain, palpitations or change in bowel habits.      In the ED:  Afebrile , /94, HR 99, 99% on 4L NC  Trop 516> 444  CXR clear   EKG no new changes

## 2023-03-15 NOTE — ED PROVIDER NOTE - OBJECTIVE STATEMENT
62-year-old male end-stage renal disease on dialysis Tuesday Thursday Saturday, BPH, CHF, hypertension, anemia, CKD, HLD, diabetes, GERD returns to ER for hypoxia.  Patient was seen in the ER earlier today for weakness and vomiting.  Patient was noted to be hypoxic which corrected with supplemental oxygen chest x-ray was negative for vascular congestion and the patient was subsequently discharged.  Patient returns to ED because upon arriving at living facility patient was noted to be hypoxic to 87% on room air.  Saturation goes up to 99 with 4 L oxygen.  Patient denies chest pain patient denies shortness of breath.  No fever.

## 2023-03-15 NOTE — H&P ADULT - PROBLEM SELECTOR PLAN 1
hx of COPD and chronic pleural effusion 2/2 renal failure   p.w hypoxia at nursing home  Afebrile , /94, HR 99, 99% on 4L NC  Trop 516> 444  CXR clear   EKG no new changes  likely 2/2 COPD exacerbation and pleural effusion 2/2 renal failure  c/w duonebs  HD tmmrw

## 2023-03-15 NOTE — ED ADULT NURSE NOTE - OBJECTIVE STATEMENT
Pt presented to ED c/o hypoxia at NH   Pt was here last night for vomiting and Dc ed to facility   O2 sat 88% on RA as per EMS , 99 % on 4 L/NC  Pt has multiple co morbidities

## 2023-03-15 NOTE — H&P ADULT - PROBLEM SELECTOR PLAN 6
IMPROVE VTE Individual Risk Assessment          RISK                                                          Points  [  ] Previous VTE                                                3  [  ] Thrombophilia                                             2  [  ] Lower limb paralysis                                   2        (unable to hold up >15 seconds)    [  ] Current Cancer                                             2         (within 6 months)  [x  ] Immobilization > 24 hrs                              1  [  ] ICU/CCU stay > 24 hours                             1  [x  ] Age > 60                                                         1    IMPROVE VTE Score:         [   2      ]    Heparin SubQ

## 2023-03-15 NOTE — H&P ADULT - NSHPLABSRESULTS_GEN_ALL_CORE
LABS:                        8.9    3.90  )-----------( 127      ( 15 Mar 2023 03:00 )             28.5     03-15    136  |  95<L>  |  28<H>  ----------------------------<  89  4.2   |  32<H>  |  8.05<H>    Ca    9.0      15 Mar 2023 03:00    TPro  6.7  /  Alb  3.2<L>  /  TBili  0.6  /  DBili  x   /  AST  18  /  ALT  14  /  AlkPhos  61  03-15        LIVER FUNCTIONS - ( 15 Mar 2023 03:00 )  Alb: 3.2 g/dL / Pro: 6.7 g/dL / ALK PHOS: 61 U/L / ALT: 14 U/L DA / AST: 18 U/L / GGT: x           Lipase, Serum: 42 U/L (03-15-23 @ 03:00)

## 2023-03-15 NOTE — H&P ADULT - NSHPPHYSICALEXAM_GEN_ALL_CORE
PHYSICAL EXAMINATION:  GENERAL: NAD,   HEAD:  Atraumatic, Normocephalic  EYES:  conjunctiva and sclera clear  NECK: Supple, No JVD, Normal thyroid  CHEST/LUNG: Clear to auscultation. Clear to percussion bilaterally; No rales, rhonchi, wheezing, or rubs  HEART: Regular rate and rhythm; No murmurs, rubs, or gallops  ABDOMEN: Soft, Nontender, Nondistended; Bowel sounds present  NERVOUS SYSTEM:  Alert & Oriented X3,    EXTREMITIES:  left BKA  SKIN: warm dry

## 2023-03-15 NOTE — H&P ADULT - ATTENDING COMMENTS
# ACUTE ON CHRONIC HYPOXIC RESPIRATORY FAILURE S/T ? PULMONARY EDEMA + HX OF COPD  # UNCONTROLLED HTN  # ESRD ON HD TTS - W/ HX OF NONCOMPLIANCE    - HYDRALAZINE, METOPROLOL AND NORVASC  - PLACED ON DECADRON AND LEVAQUIN RECENTLY  - SUPPLEMENTAL O2  - NEPHROLOGY CONSULT    - PATIENT WILL NEED HOME O2, CM TEAM TO HELP COORDINATION; IS 87% ON ROOM AIR AT REST    # ELEVATED TROPONINS - IN THE SETTING OF RENAL FAILURE, ? DEMAND ISCHEMIA  - TELEMETRY  - TRENDED TROPONINS  -  ECHO 2/24/23 - TRACE AR, CONCENTRIC LVH, G1DD  - CARDIOLOGY CONSULT    # RECURRENT INTRACTABLE NAUSEA/VOMITING  # HISTORY OF ESOPHAGITIS AND DUODENITIS [1/2021], HX OF GASTROPARESIS W/ EVIDENCE OF THICKENED ESOPHAGUS ON PREVIOUS CT SCAN   - EGD AT [facility] - DEMONSTRATED RETAINED FOOD PRODUCT IN STOMACH, RECOMMENDED FOR GASTRIC EMPTYING STUDY  - DENIES RECENT HEMATEMESIS   - MONITORING HGB, PLACED ON PPI BID  - CARAFATE, PRN ANTIEMETICS  - MONITORING FOR SYMPTOMS  - TOLERATING DIET ; PATIENT REPEATEDLY COUNSELLED TO CHEW FOOD CAUTIOUSLY AND CONSUME SMALL BITES W/ REGULAR CLEARING WITH WATER BETWEEN BITES.      - PATIENT RECENTLY STARTED EVALUATION TO R/O PANCREATIC HEAD MASS, UNDERWENT MRCP, PLANNED FOR OUTPATIENT F/U    # HYPOGLYCEMIC EPISODE, LABILE BLOOD GLUCOSE  # SUSPECTED GASTROPARESIS  # UNDERLYING DM  - SSI + FS  - ENDOCRINOLOGY CONSULT    # ? HEMOCHROMATOSIS  - NOTED FERRITIN, F/U IRON AND TIBC, TRANSFERRIN SAT  - WILL REFER HEPATOLOGY AS OUTPATIENT PENDING ABOVE WORKUP    # PATIENT UNDERGOING OUTPATIENT WORKUP FOR CENTRAL VENOUS STENOSIS THROUGH NEPHROLOGY TEAM    # ANEMIA OF CKD  # HX OF PANCYTOPENIA   - TREND HGB, TRANSFUSION THRESHOLD HGB < 7  - TYPE AND SCREEN  - ON EPO  - DENIES RECENT HEMATEMESIS, MELENA, HEMATOCHEZIA  - HEME/ONC CONSULT    # SEVERE PROTEIN CALORIE MALNUTRITION, FAILURE TO THRIVE - NUTRITIONAL SUPPLEMENT    # HLD  # PARTIALLY BLIND  # S/P LEFT BKA  # HX OF PVD  # LS SPINAL STENOSIS  # GI AND DVT PPX

## 2023-03-15 NOTE — ED ADULT NURSE NOTE - NSIMPLEMENTINTERV_GEN_ALL_ED
Implemented All Fall with Harm Risk Interventions:  [place] to call system. Call bell, personal items and telephone within reach. Instruct patient to call for assistance. Room bathroom lighting operational. Non-slip footwear when patient is off stretcher. Physically safe environment: no spills, clutter or unnecessary equipment. Stretcher in lowest position, wheels locked, appropriate side rails in place. Provide visual cue, wrist band, yellow gown, etc. Monitor gait and stability. Monitor for mental status changes and reorient to person, place, and time. Review medications for side effects contributing to fall risk. Reinforce activity limits and safety measures with patient and family. Provide visual clues: red socks.

## 2023-03-15 NOTE — ED ADULT NURSE REASSESSMENT NOTE - NS ED NURSE REASSESS COMMENT FT1
Pt was admitted to Telemetry, refused cardiac monitor , Dr Mosher aware   No IV HL , pt only allowed me to try once   Placed on 100 % NRB , as per Dr De La Torre , pt requested .

## 2023-03-15 NOTE — CONSULT NOTE ADULT - ASSESSMENT
Patient is a 62y Male whom presented to the hospital with hypoxia.  He has ESRD on HD TTS at [facility], last dialysed yesterday with UF of 1.6 kilos. Presented to ED yesterday with vomitting and generalized weakness.  upon discharge, was noted to be hypoxic sats 88% on RA, thus re-sent to ED. sats was 99% on oxygen. a CXR did not reveal any pulmonary edema.  at time of exam, he denies any SOB and is talking in complete sentences off oxygen. had an episode of vomitting in ED.  renal consulted for ESRD.    # ESRD. HE IS EUVOLEMIC. RESUME HD IN AM . WORK-UP FOR HYPOXIA TX PRIMARY TEAM  advised compliance with HD   # anemia of CKD. epogen on HD   # CKDMBD  RESUME BINDERS. CHECK PHOS ON HD   THANKS FOR THE CONSULT

## 2023-03-15 NOTE — ED ADULT NURSE REASSESSMENT NOTE - NS ED NURSE REASSESS COMMENT FT1
Pt continues to refuse IV HL insertion and Telemetry monitoring , Dr Akhtar aware and stated it is ok for pt to refuse

## 2023-03-15 NOTE — ED ADULT NURSE REASSESSMENT NOTE - NS ED NURSE REASSESS COMMENT FT1
pt noted to be aggressive and verbally abusive, refused nasal swab, cardiac monitor and nasal swab. Keeps taking off NRB mask. Risk vs benefits explained. Pt verbalized " Leave me alone, I want to sleep". MD faye made aware. pt noted to be aggressive and verbally abusive, refused nasal swab, cardiac monitor and nasal swab. Keeps taking off/non complaint NRB mask. Risk vs benefits explained. Pt verbalized " Leave me alone, I want to sleep". MD faye made aware.

## 2023-03-15 NOTE — ED ADULT NURSE NOTE - OBJECTIVE STATEMENT
Rcvd pt from MICHI kline, awake, alert and oriented. Appears agitated and angry. As per triage noted, pt c/o generalized weakness /vomiting/low sat 85%RA. Upon assessment, pt refused cardiac monitor and rechecked vitals signs.

## 2023-03-15 NOTE — ED PROVIDER NOTE - BIRTH SEX
ASTHMA ACTION PLAN for [facility]     : 1979     Date: 2019  Provider:  Tarri Ganser, MD  Phone for doctor or clinic: [address] [facility]  [place] [id number] [phone] Male

## 2023-03-15 NOTE — ED PROVIDER NOTE - CLINICAL SUMMARY MEDICAL DECISION MAKING FREE TEXT BOX
62-year-old male end-stage renal on dialysis Tuesday Thursday and Saturday for dialysis on Tuesday history of CHF BPH hypertension anemia CAD hyperlipidemia diabetes and GERD presents with hypoxia.  Patient satting 88% on room air.  Oxygen goes up with supplemental oxygen.  Chest x-ray done earlier negative for infiltrate or vascular congestion. proBNP elevated.  Patient to be admitted

## 2023-03-15 NOTE — H&P ADULT - ASSESSMENT
62 year old, Male, w/ PMH of ESRD on dialysis (Tues, Thurs, Sat), DM2, CAD s/p MI, CHF, glaucoma, legally blind, HTN, orthostatic hypotension, hyperparathyroid, hyperkalemia, LS spinal stenosis, osteoarthritis, osteoporosis, PAD, hx of osteomyelitis of thoracic vertebrae, remote hx of crack-cocaine use disorder, PSH of BKA of L leg presents after being found hypoxic at the nursing home. Patient will be admitted for AHRF 2/2 COPD exacerbation and chronic pleural effusions 2/2 advance renal failure

## 2023-03-16 NOTE — PATIENT PROFILE ADULT - FALL HARM RISK - PATIENT NEEDS ASSISTANCE
Detail Level: Detailed Quality 402: Tobacco Use And Help With Quitting Among Adolescents: Patient screened for tobacco and never smoked Quality 226: Preventive Care And Screening: Tobacco Use: Screening And Cessation Intervention: Patient screened for tobacco use and is an ex/non-smoker Standing/Toileting

## 2023-03-16 NOTE — PATIENT PROFILE ADULT - FALL HARM RISK - HARM RISK INTERVENTIONS

## 2023-03-16 NOTE — PROGRESS NOTE ADULT - PROBLEM SELECTOR PLAN 3
ESRD on HD TTS at [facility], last dialysed yesterday with UF of 1.6 kilos  s/p HD today  Dr. Mosher following

## 2023-03-16 NOTE — PROGRESS NOTE ADULT - SUBJECTIVE AND OBJECTIVE BOX
[facility] : Progress Note :: [phone], (office [phone]),   Dr Mosher / Dr Washington / Dr Young / Dr Doll / Dr Varsha TURCIOS / Dr Tello / Dr Garcia / Dr Larry qiu  _____________________________________________________________________________________________  seen on HD     fish (Rash)  liver (Anaphylaxis)  No Known Drug Allergies    Hospital Medications:   MEDICATIONS  (STANDING):  albuterol/ipratropium for Nebulization 3 milliLiter(s) Nebulizer every 6 hours  ALPRAZolam 0.25 milliGRAM(s) Oral at bedtime  amLODIPine   Tablet 10 milliGRAM(s) Oral daily  calcium carbonate    500 mG (Tums) Chewable 1 Tablet(s) Chew two times a day  chlorhexidine 2% Cloths 1 Application(s) Topical daily  epoetin beverley-epbx (RETACRIT) Injectable 27801 Unit(s) IV Push <User Schedule>  ferrous    sulfate 325 milliGRAM(s) Oral daily  furosemide    Tablet 80 milliGRAM(s) Oral daily  heparin   Injectable 5000 Unit(s) SubCutaneous every 8 hours  hydrALAZINE 25 milliGRAM(s) Oral three times a day  insulin lispro (ADMELOG) corrective regimen sliding scale   SubCutaneous three times a day before meals  levothyroxine 25 MICROGram(s) Oral daily  metoclopramide 10 milliGRAM(s) Oral three times a day  metolazone 10 milliGRAM(s) Oral daily  metoprolol succinate ER 25 milliGRAM(s) Oral daily  pantoprazole    Tablet 40 milliGRAM(s) Oral before breakfast  simvastatin 40 milliGRAM(s) Oral at bedtime  sucralfate suspension 1 Gram(s) Oral four times a day        VITALS:  T(F): 98.2 (03-16-23 @ 10:30), Max: 99.1 (03-15-23 @ 15:48)  HR: 95 (03-16-23 @ 10:30)  BP: 143/73 (03-16-23 @ 10:30)  RR: 18 (03-16-23 @ 10:30)  SpO2: 98% (03-16-23 @ 10:30)  Wt(kg): --      PHYSICAL EXAM:  Constitutional: NAD  HEENT: anicteric sclera, oropharynx clear  Neck: No JVD  Respiratory: CTAB, no wheezes, rales or rhonchi  Cardiovascular: S1, S2, RRR  Gastrointestinal: BS+, soft, NT/ND  Extremities:  No peripheral edema  Neurological: A/O x 3, no focal deficits  Vascular Access: AVF cannulated    LABS:  03-16    138  |  95<L>  |  48<H>  ----------------------------<  104<H>  4.5   |  30  |  11.00<H>    Ca    8.6      16 Mar 2023 10:30  Phos  5.7     03-16  Mg     2.9     03-16    TPro  6.7  /  Alb  3.2<L>  /  TBili  0.6  /  DBili      /  AST  18  /  ALT  14  /  AlkPhos  61  03-15    Creatinine Trend: 11.00 <--, 8.05 <--                        8.4    3.14  )-----------( 105      ( 16 Mar 2023 10:30 )             27.1     Urine Studies:      RADIOLOGY & ADDITIONAL STUDIES:

## 2023-03-16 NOTE — PROGRESS NOTE ADULT - SUBJECTIVE AND OBJECTIVE BOX
PGY-1 Progress Note discussed with attending    PAGER #: [[phone]] TILL 5:00 PM  PLEASE CONTACT ON CALL TEAM:   - On Call Team (Please refer to Dejon) FROM 5:00 PM - 8:30PM  - Nightfloat Team FROM 8:30 -7:30 AM    INTERVAL HPI/OVERNIGHT EVENTS:   No acute overnight events. Pt seen at bedside, denies any acute complaints and is annoyed by questioning. States he has no pain. Later, was paged by dialysis nurse due to patient complaining of chest pain. Pt assessed and chest pain resolved. Pt requires supplemental oxygen.     REVIEW OF SYSTEMS:  CONSTITUTIONAL: No fever, weight loss, or fatigue  RESPIRATORY: No cough, wheezing, chills or hemoptysis; No shortness of breath  CARDIOVASCULAR: No chest pain, palpitations, dizziness, or leg swelling  GASTROINTESTINAL: No abdominal pain. No nausea, vomiting, or hematemesis; No diarrhea or constipation. No melena or hematochezia.  GENITOURINARY: No dysuria or hematuria, urinary frequency  NEUROLOGICAL: No headaches, memory loss, loss of strength, numbness, or tremors  SKIN: No itching, burning, rashes, or lesions     MEDICATIONS  (STANDING):  albuterol/ipratropium for Nebulization 3 milliLiter(s) Nebulizer every 6 hours  ALPRAZolam 0.25 milliGRAM(s) Oral at bedtime  amLODIPine   Tablet 10 milliGRAM(s) Oral daily  calcium carbonate    500 mG (Tums) Chewable 1 Tablet(s) Chew two times a day  chlorhexidine 2% Cloths 1 Application(s) Topical daily  epoetin beverley-epbx (RETACRIT) Injectable 34021 Unit(s) IV Push <User Schedule>  ferrous    sulfate 325 milliGRAM(s) Oral daily  furosemide    Tablet 80 milliGRAM(s) Oral daily  heparin   Injectable 5000 Unit(s) SubCutaneous every 8 hours  hydrALAZINE 25 milliGRAM(s) Oral three times a day  insulin lispro (ADMELOG) corrective regimen sliding scale   SubCutaneous three times a day before meals  levothyroxine 25 MICROGram(s) Oral daily  metoclopramide 10 milliGRAM(s) Oral three times a day  metolazone 10 milliGRAM(s) Oral daily  metoprolol succinate ER 25 milliGRAM(s) Oral daily  pantoprazole    Tablet 40 milliGRAM(s) Oral before breakfast  sevelamer carbonate 800 milliGRAM(s) Oral three times a day with meals  simvastatin 40 milliGRAM(s) Oral at bedtime  sucralfate suspension 1 Gram(s) Oral four times a day    MEDICATIONS  (PRN):  acetaminophen     Tablet .. 650 milliGRAM(s) Oral every 6 hours PRN Temp greater or equal to 38C (100.4F), Mild Pain (1 - 3)  oxyCODONE    IR 5 milliGRAM(s) Oral every 8 hours PRN Severe Pain (7 - 10)  zolpidem 5 milliGRAM(s) Oral at bedtime PRN Insomnia      Vital Signs Last 24 Hrs  T(C): 37.6 (16 Mar 2023 14:27), Max: 37.6 (16 Mar 2023 14:27)  T(F): 99.7 (16 Mar 2023 14:27), Max: 99.7 (16 Mar 2023 14:27)  HR: 94 (16 Mar 2023 14:27) (84 - 101)  BP: 166/89 (16 Mar 2023 14:27) (136/67 - 198/95)  BP(mean): 111 (15 Mar 2023 21:15) (111 - 111)  RR: 19 (16 Mar 2023 14:27) (18 - 20)  SpO2: 94% (16 Mar 2023 14:27) (87% - 100%)    Parameters below as of 16 Mar 2023 14:27  Patient On (Oxygen Delivery Method): mask, nonrebreather  O2 Flow (L/min): 3      PHYSICAL EXAMINATION:  GENERAL: NAD, legally blind male  HEAD:  Atraumatic, Normocephalic  EYES:  conjunctiva and sclera clear  NECK: Supple, No JVD, Normal thyroid  CHEST/LUNG: Clear to auscultation. Clear to percussion bilaterally; No rales, rhonchi, wheezing, or rubs  HEART: Regular rate and rhythm; No murmurs, rubs, or gallops  ABDOMEN: Soft, Nontender, Nondistended; Bowel sounds present  NERVOUS SYSTEM:  Alert & Oriented X3  EXTREMITIES:  2+ Peripheral Pulses, No clubbing, cyanosis, or edema. +RUE AV fistula  SKIN: warm dry                          8.4    3.14  )-----------( 105      ( 16 Mar 2023 10:30 )             27.1     03-16    138  |  95<L>  |  48<H>  ----------------------------<  104<H>  4.5   |  30  |  11.00<H>    Ca    8.6      16 Mar 2023 10:30  Phos  5.7     03-16  Mg     2.9     03-16    TPro  6.7  /  Alb  3.2<L>  /  TBili  0.6  /  DBili  x   /  AST  18  /  ALT  14  /  AlkPhos  61  03-15    LIVER FUNCTIONS - ( 15 Mar 2023 03:00 )  Alb: 3.2 g/dL / Pro: 6.7 g/dL / ALK PHOS: 61 U/L / ALT: 14 U/L DA / AST: 18 U/L / GGT: x                       I&O's Summary    16 Mar 2023 07:01  -  16 Mar 2023 14:49  --------------------------------------------------------  IN: 0 mL / OUT: 1300 mL / NET: -1300 mL        CAPILLARY BLOOD GLUCOSE      POCT Blood Glucose.: 90 mg/dL (16 Mar 2023 11:28)    CAPILLARY BLOOD GLUCOSE      POCT Blood Glucose.: 90 mg/dL (16 Mar 2023 11:28)  POCT Blood Glucose.: 109 mg/dL (16 Mar 2023 08:01)

## 2023-03-16 NOTE — PATIENT PROFILE ADULT - PATIENT'S SEXUAL ORIENTATION
Heterosexual Rhomboid Transposition Flap Text: The defect edges were debeveled with a #15 scalpel blade.  Given the location of the defect and the proximity to free margins a rhomboid transposition flap was deemed most appropriate.  Using a sterile surgical marker, an appropriate rhomboid flap was drawn incorporating the defect.    The area thus outlined was incised deep to adipose tissue with a #15 scalpel blade.  The skin margins were undermined to an appropriate distance in all directions utilizing iris scissors.

## 2023-03-16 NOTE — PROGRESS NOTE ADULT - PROBLEM SELECTOR PLAN 1
hx of COPD and chronic pleural effusion 2/2 renal failure   p.w hypoxia at nursing home  Afebrile , /94, HR 99, 99% on 4L NC  Trop 516> 444  CXR clear   EKG no new changes  likely 2/2 pulmonary edema and pleural effusion 2/2 renal failure  c/w duonebs  will obtain CTA lung r/o PE (pt high risk)  HD today

## 2023-03-16 NOTE — PROGRESS NOTE ADULT - ASSESSMENT
Patient is a 62y Male whom presented to the hospital with hypoxia.  He has ESRD on HD TTS at [facility], last dialysed yesterday with UF of 1.6 kilos. Presented to ED yesterday with vomitting and generalized weakness.  upon discharge, was noted to be hypoxic sats 88% on RA, thus re-sent to ED. sats was 99% on oxygen. a CXR did not reveal any pulmonary edema.  at time of exam, he denies any SOB and is talking in complete sentences off oxygen. had an episode of vomitting in ED.  renal consulted for ESRD.    # ESRD.  seen on HD   advised compliance with HD   # hypoxia. encouraged patient to get the imaging studies recommended by pulmonary and primary team   # anemia of CKD. epogen on HD   # CKDMBD  PHOS ELEVATED. RESTART SEVELAMER   #

## 2023-03-16 NOTE — PROGRESS NOTE ADULT - ATTENDING COMMENTS
# ACUTE ON CHRONIC HYPOXIC RESPIRATORY FAILURE S/T ? PULMONARY EDEMA + HX OF COPD  # UNCONTROLLED HTN  # ESRD ON HD TTS - W/ HX OF NONCOMPLIANCE    - HYDRALAZINE, METOPROLOL AND NORVASC  - PLACED ON DECADRON AND LEVAQUIN RECENTLY  - SUPPLEMENTAL O2  - NEPHROLOGY CONSULT    - PATIENT WILL NEED HOME O2, CM TEAM TO HELP COORDINATION; IS 87% ON ROOM AIR AT REST    - RECOMMENDED CTANGIO CHEST BY PULMONOLOGY TEAM      # ELEVATED TROPONINS - IN THE SETTING OF RENAL FAILURE, ? DEMAND ISCHEMIA  - TELEMETRY  - TRENDED TROPONINS  -  ECHO 2/24/23 - TRACE AR, CONCENTRIC LVH, G1DD  - CARDIOLOGY CONSULT    # RECURRENT INTRACTABLE NAUSEA/VOMITING  # HISTORY OF ESOPHAGITIS AND DUODENITIS [1/2021], HX OF GASTROPARESIS W/ EVIDENCE OF THICKENED ESOPHAGUS ON PREVIOUS CT SCAN   - EGD AT [facility] - DEMONSTRATED RETAINED FOOD PRODUCT IN STOMACH, RECOMMENDED FOR GASTRIC EMPTYING STUDY  - DENIES RECENT HEMATEMESIS   - MONITORING HGB, PLACED ON PPI BID  - CARAFATE, PRN ANTIEMETICS  - MONITORING FOR SYMPTOMS  - TOLERATING DIET ; PATIENT REPEATEDLY COUNSELLED TO CHEW FOOD CAUTIOUSLY AND CONSUME SMALL BITES W/ REGULAR CLEARING WITH WATER BETWEEN BITES.      - PATIENT RECENTLY STARTED EVALUATION TO R/O PANCREATIC HEAD MASS, UNDERWENT MRCP, PLANNED FOR OUTPATIENT F/U    # HYPOGLYCEMIC EPISODE, LABILE BLOOD GLUCOSE  # SUSPECTED GASTROPARESIS  # UNDERLYING DM  - SSI + FS  - ENDOCRINOLOGY CONSULT    # ? HEMOCHROMATOSIS  - NOTED FERRITIN, F/U IRON AND TIBC, TRANSFERRIN SAT  - WILL REFER HEPATOLOGY AS OUTPATIENT PENDING ABOVE WORKUP    # PATIENT UNDERGOING OUTPATIENT WORKUP FOR CENTRAL VENOUS STENOSIS THROUGH NEPHROLOGY TEAM    # ANEMIA OF CKD  # HX OF PANCYTOPENIA   - TREND HGB, TRANSFUSION THRESHOLD HGB < 7  - TYPE AND SCREEN  - ON EPO  - DENIES RECENT HEMATEMESIS, MELENA, HEMATOCHEZIA  - HEME/ONC CONSULT    # SEVERE PROTEIN CALORIE MALNUTRITION, FAILURE TO THRIVE - NUTRITIONAL SUPPLEMENT    # HLD  # PARTIALLY BLIND  # S/P LEFT BKA  # HX OF PVD  # LS SPINAL STENOSIS  # GI AND DVT PPX .

## 2023-03-17 NOTE — PROGRESS NOTE ADULT - SUBJECTIVE AND OBJECTIVE BOX
[facility] : Progress Note :: [phone], (office [phone]),   Dr Mosher / Dr Washington / Dr Young / Dr Doll / Dr Varsha TURCIOS / Dr Tello / Dr Garcia / Dr Larry qiu  _____________________________________________________________________________________________  awaits CTA chest     fish (Rash)  liver (Anaphylaxis)  No Known Drug Allergies    Hospital Medications:   MEDICATIONS  (STANDING):  albuterol/ipratropium for Nebulization 3 milliLiter(s) Nebulizer every 6 hours  ALPRAZolam 0.25 milliGRAM(s) Oral at bedtime  amLODIPine   Tablet 10 milliGRAM(s) Oral daily  budesonide 160 MICROgram(s)/formoterol 4.5 MICROgram(s) Inhaler 2 Puff(s) Inhalation two times a day  calcium carbonate    500 mG (Tums) Chewable 1 Tablet(s) Chew two times a day  chlorhexidine 2% Cloths 1 Application(s) Topical daily  epoetin beverley-epbx (RETACRIT) Injectable 57681 Unit(s) IV Push <User Schedule>  ferrous    sulfate 325 milliGRAM(s) Oral daily  furosemide    Tablet 80 milliGRAM(s) Oral daily  heparin   Injectable 5000 Unit(s) SubCutaneous every 8 hours  hydrALAZINE 25 milliGRAM(s) Oral three times a day  insulin lispro (ADMELOG) corrective regimen sliding scale   SubCutaneous three times a day before meals  levothyroxine 25 MICROGram(s) Oral daily  metoclopramide 10 milliGRAM(s) Oral three times a day  metolazone 10 milliGRAM(s) Oral daily  metoprolol succinate ER 25 milliGRAM(s) Oral daily  pantoprazole    Tablet 40 milliGRAM(s) Oral before breakfast  sevelamer carbonate 800 milliGRAM(s) Oral three times a day with meals  simvastatin 40 milliGRAM(s) Oral at bedtime  sucralfate suspension 1 Gram(s) Oral four times a day      VITALS:  T(F): 98.4 (03-17-23 @ 10:05), Max: 99.7 (03-16-23 @ 14:27)  HR: 87 (03-17-23 @ 10:05)  BP: 136/69 (03-17-23 @ 10:05)  RR: 18 (03-17-23 @ 10:05)  SpO2: 92% (03-17-23 @ 10:05)  Wt(kg): --    03-16 @ 07:01  -  03-17 @ 07:00  --------------------------------------------------------  IN: 0 mL / OUT: 1300 mL / NET: -1300 mL        PHYSICAL EXAM:  Constitutional: NAD  HEENT: anicteric sclera, oropharynx clear,  Neck: No JVD  Respiratory: CTAB, no wheezes, rales or rhonchi  Cardiovascular: S1, S2, RRR  Gastrointestinal: BS+, soft, NT/ND  Extremities: No peripheral edema  Neurological: A/O x 3, no focal deficits  Vascular Access: AVF with thrill and bruit    LABS:  03-17    138  |  99  |  36<H>  ----------------------------<  185<H>  4.6   |  28  |  8.21<H>    Ca    9.1      17 Mar 2023 08:15  Phos  4.1     03-17  Mg     2.8     03-17      Creatinine Trend: 8.21 <--, 11.00 <--, 8.05 <--                        9.3    2.84  )-----------( 100      ( 17 Mar 2023 08:15 )             30.5     Urine Studies:      RADIOLOGY & ADDITIONAL STUDIES:

## 2023-03-17 NOTE — PROGRESS NOTE ADULT - ASSESSMENT
Patient is a 62y Male whom presented to the hospital with hypoxia.  He has ESRD on HD TTS at [facility], last dialysed yesterday with UF of 1.6 kilos. Presented to ED yesterday with vomitting and generalized weakness.  upon discharge, was noted to be hypoxic sats 88% on RA, thus re-sent to ED. sats was 99% on oxygen. a CXR did not reveal any pulmonary edema.  at time of exam, he denies any SOB and is talking in complete sentences off oxygen. had an episode of vomitting in ED.  renal consulted for ESRD.    # ESRD.  s/p HD yesterday   # hypoxia. awaiting CTA chest    # anemia of CKD. epogen on HD   # CKDMBD  PHOS ELEVATED.  SEVELAMER   D/C planning

## 2023-03-17 NOTE — PROGRESS NOTE ADULT - SUBJECTIVE AND OBJECTIVE BOX
PGY-1 Progress Note discussed with attending    PAGER #: [[phone]] TILL 5:00 PM  PLEASE CONTACT ON CALL TEAM:   - On Call Team (Please refer to Dejon) FROM 5:00 PM - 8:30PM  - Nightfloat Team FROM 8:30 -7:30 AM    INTERVAL HPI/OVERNIGHT EVENTS:       REVIEW OF SYSTEMS:  CONSTITUTIONAL: No fever, weight loss, or fatigue  RESPIRATORY: No cough, wheezing, chills or hemoptysis; No shortness of breath  CARDIOVASCULAR: No chest pain, palpitations, dizziness, or leg swelling  GASTROINTESTINAL: No abdominal pain. No nausea, vomiting, or hematemesis; No diarrhea or constipation. No melena or hematochezia.  GENITOURINARY: No dysuria or hematuria, urinary frequency  NEUROLOGICAL: No headaches, memory loss, loss of strength, numbness, or tremors  SKIN: No itching, burning, rashes, or lesions     MEDICATIONS  (STANDING):  albuterol/ipratropium for Nebulization 3 milliLiter(s) Nebulizer every 6 hours  ALPRAZolam 0.25 milliGRAM(s) Oral at bedtime  amLODIPine   Tablet 10 milliGRAM(s) Oral daily  budesonide 160 MICROgram(s)/formoterol 4.5 MICROgram(s) Inhaler 2 Puff(s) Inhalation two times a day  calcium carbonate    500 mG (Tums) Chewable 1 Tablet(s) Chew two times a day  chlorhexidine 2% Cloths 1 Application(s) Topical daily  epoetin beverley-epbx (RETACRIT) Injectable 39367 Unit(s) IV Push <User Schedule>  ferrous    sulfate 325 milliGRAM(s) Oral daily  furosemide    Tablet 80 milliGRAM(s) Oral daily  heparin   Injectable 5000 Unit(s) SubCutaneous every 8 hours  hydrALAZINE 25 milliGRAM(s) Oral three times a day  insulin lispro (ADMELOG) corrective regimen sliding scale   SubCutaneous three times a day before meals  levothyroxine 25 MICROGram(s) Oral daily  metoclopramide 10 milliGRAM(s) Oral three times a day  metolazone 10 milliGRAM(s) Oral daily  metoprolol succinate ER 25 milliGRAM(s) Oral daily  pantoprazole    Tablet 40 milliGRAM(s) Oral before breakfast  sevelamer carbonate 800 milliGRAM(s) Oral three times a day with meals  simvastatin 40 milliGRAM(s) Oral at bedtime  sucralfate suspension 1 Gram(s) Oral four times a day    MEDICATIONS  (PRN):  acetaminophen     Tablet .. 650 milliGRAM(s) Oral every 6 hours PRN Temp greater or equal to 38C (100.4F), Mild Pain (1 - 3)  oxyCODONE    IR 5 milliGRAM(s) Oral every 8 hours PRN Severe Pain (7 - 10)  zolpidem 5 milliGRAM(s) Oral at bedtime PRN Insomnia      Vital Signs Last 24 Hrs  T(C): 36.8 (17 Mar 2023 06:32), Max: 37.6 (16 Mar 2023 14:27)  T(F): 98.3 (17 Mar 2023 06:32), Max: 99.7 (16 Mar 2023 14:27)  HR: 85 (17 Mar 2023 06:32) (85 - 98)  BP: 161/84 (17 Mar 2023 06:32) (137/73 - 166/89)  BP(mean): --  RR: 19 (17 Mar 2023 06:32) (17 - 20)  SpO2: 100% (17 Mar 2023 06:32) (92% - 100%)    Parameters below as of 17 Mar 2023 06:32  Patient On (Oxygen Delivery Method): mask, nonrebreather  O2 Flow (L/min): 3      PHYSICAL EXAMINATION:  GENERAL: NAD, well built  HEAD:  Atraumatic, Normocephalic  EYES:  conjunctiva and sclera clear  NECK: Supple, No JVD, Normal thyroid  CHEST/LUNG: Clear to auscultation. Clear to percussion bilaterally; No rales, rhonchi, wheezing, or rubs  HEART: Regular rate and rhythm; No murmurs, rubs, or gallops  ABDOMEN: Soft, Nontender, Nondistended; Bowel sounds present  NERVOUS SYSTEM:  Alert & Oriented X3,    EXTREMITIES:  2+ Peripheral Pulses, No clubbing, cyanosis, or edema  SKIN: warm dry                          9.3    2.84  )-----------( 100      ( 17 Mar 2023 08:15 )             30.5     03-17    138  |  99  |  36<H>  ----------------------------<  185<H>  4.6   |  28  |  8.21<H>    Ca    9.1      17 Mar 2023 08:15  Phos  4.1     03-17  Mg     2.8     03-17                    I&O's Summary    16 Mar 2023 07:01  -  17 Mar 2023 07:00  --------------------------------------------------------  IN: 0 mL / OUT: 1300 mL / NET: -1300 mL        CAPILLARY BLOOD GLUCOSE      POCT Blood Glucose.: 220 mg/dL (17 Mar 2023 07:45)    CAPILLARY BLOOD GLUCOSE      POCT Blood Glucose.: 220 mg/dL (17 Mar 2023 07:45)  POCT Blood Glucose.: 212 mg/dL (16 Mar 2023 21:34)  POCT Blood Glucose.: 175 mg/dL (16 Mar 2023 16:55)  POCT Blood Glucose.: 90 mg/dL (16 Mar 2023 11:28)      RADIOLOGY & ADDITIONAL TESTS:                   PGY-1 Progress Note discussed with attending    PAGER #: [[phone]] TILL 5:00 PM  PLEASE CONTACT ON CALL TEAM:   - On Call Team (Please refer to Dejon) FROM 5:00 PM - 8:30PM  - Nightfloat Team FROM 8:30 -7:30 AM    INTERVAL HPI/OVERNIGHT EVENTS:   No acute overnight events. Pt is in NAD, comfortable and states he "is fine." Pt initially refused IV for CTA but later was agreeable. He is eager to get back to his facility.     REVIEW OF SYSTEMS:  CONSTITUTIONAL: No fever, weight loss, or fatigue  RESPIRATORY: No cough, wheezing, chills or hemoptysis; No shortness of breath  CARDIOVASCULAR: No chest pain, palpitations, dizziness, or leg swelling  GASTROINTESTINAL: No abdominal pain. No nausea, vomiting, or hematemesis; No diarrhea or constipation. No melena or hematochezia.  GENITOURINARY: No dysuria or hematuria, urinary frequency  NEUROLOGICAL: No headaches, memory loss, loss of strength, numbness, or tremors  SKIN: No itching, burning, rashes, or lesions     MEDICATIONS  (STANDING):  albuterol/ipratropium for Nebulization 3 milliLiter(s) Nebulizer every 6 hours  ALPRAZolam 0.25 milliGRAM(s) Oral at bedtime  amLODIPine   Tablet 10 milliGRAM(s) Oral daily  budesonide 160 MICROgram(s)/formoterol 4.5 MICROgram(s) Inhaler 2 Puff(s) Inhalation two times a day  calcium carbonate    500 mG (Tums) Chewable 1 Tablet(s) Chew two times a day  chlorhexidine 2% Cloths 1 Application(s) Topical daily  epoetin beverley-epbx (RETACRIT) Injectable 22532 Unit(s) IV Push <User Schedule>  ferrous    sulfate 325 milliGRAM(s) Oral daily  furosemide    Tablet 80 milliGRAM(s) Oral daily  heparin   Injectable 5000 Unit(s) SubCutaneous every 8 hours  hydrALAZINE 25 milliGRAM(s) Oral three times a day  insulin lispro (ADMELOG) corrective regimen sliding scale   SubCutaneous three times a day before meals  levothyroxine 25 MICROGram(s) Oral daily  metoclopramide 10 milliGRAM(s) Oral three times a day  metolazone 10 milliGRAM(s) Oral daily  metoprolol succinate ER 25 milliGRAM(s) Oral daily  pantoprazole    Tablet 40 milliGRAM(s) Oral before breakfast  sevelamer carbonate 800 milliGRAM(s) Oral three times a day with meals  simvastatin 40 milliGRAM(s) Oral at bedtime  sucralfate suspension 1 Gram(s) Oral four times a day    MEDICATIONS  (PRN):  acetaminophen     Tablet .. 650 milliGRAM(s) Oral every 6 hours PRN Temp greater or equal to 38C (100.4F), Mild Pain (1 - 3)  oxyCODONE    IR 5 milliGRAM(s) Oral every 8 hours PRN Severe Pain (7 - 10)  zolpidem 5 milliGRAM(s) Oral at bedtime PRN Insomnia      Vital Signs Last 24 Hrs  T(C): 36.8 (17 Mar 2023 06:32), Max: 37.6 (16 Mar 2023 14:27)  T(F): 98.3 (17 Mar 2023 06:32), Max: 99.7 (16 Mar 2023 14:27)  HR: 85 (17 Mar 2023 06:32) (85 - 98)  BP: 161/84 (17 Mar 2023 06:32) (137/73 - 166/89)  BP(mean): --  RR: 19 (17 Mar 2023 06:32) (17 - 20)  SpO2: 100% (17 Mar 2023 06:32) (92% - 100%)    Parameters below as of 17 Mar 2023 06:32  Patient On (Oxygen Delivery Method): mask, nonrebreather  O2 Flow (L/min): 3      PHYSICAL EXAMINATION:  GENERAL: NAD, legally blind male  HEAD:  Atraumatic, Normocephalic  EYES:  conjunctiva and sclera clear  NECK: Supple, No JVD, Normal thyroid  CHEST/LUNG: Clear to auscultation. Clear to percussion bilaterally; No rales, rhonchi, wheezing, or rubs  HEART: Regular rate and rhythm; No murmurs, rubs, or gallops  ABDOMEN: Soft, Nontender, Nondistended; Bowel sounds present  NERVOUS SYSTEM:  Alert & Oriented X3  EXTREMITIES:  2+ Peripheral Pulses, No clubbing, cyanosis, or edema. +RUE AV fistula  SKIN: warm dry                          9.3    2.84  )-----------( 100      ( 17 Mar 2023 08:15 )             30.5     03-17    138  |  99  |  36<H>  ----------------------------<  185<H>  4.6   |  28  |  8.21<H>    Ca    9.1      17 Mar 2023 08:15  Phos  4.1     03-17  Mg     2.8     03-17              I&O's Summary    16 Mar 2023 07:01  -  17 Mar 2023 07:00  --------------------------------------------------------  IN: 0 mL / OUT: 1300 mL / NET: -1300 mL        CAPILLARY BLOOD GLUCOSE  POCT Blood Glucose.: 220 mg/dL (17 Mar 2023 07:45)    CAPILLARY BLOOD GLUCOSE  POCT Blood Glucose.: 220 mg/dL (17 Mar 2023 07:45)  POCT Blood Glucose.: 212 mg/dL (16 Mar 2023 21:34)  POCT Blood Glucose.: 175 mg/dL (16 Mar 2023 16:55)  POCT Blood Glucose.: 90 mg/dL (16 Mar 2023 11:28)

## 2023-03-17 NOTE — PROGRESS NOTE ADULT - SUBJECTIVE AND OBJECTIVE BOX
Time of Visit:  Patient seen and examined.     MEDICATIONS  (STANDING):  albuterol/ipratropium for Nebulization 3 milliLiter(s) Nebulizer every 6 hours  ALPRAZolam 0.25 milliGRAM(s) Oral at bedtime  amLODIPine   Tablet 10 milliGRAM(s) Oral daily  budesonide 160 MICROgram(s)/formoterol 4.5 MICROgram(s) Inhaler 2 Puff(s) Inhalation two times a day  calcium carbonate    500 mG (Tums) Chewable 1 Tablet(s) Chew two times a day  chlorhexidine 2% Cloths 1 Application(s) Topical daily  epoetin beverley-epbx (RETACRIT) Injectable 81977 Unit(s) IV Push <User Schedule>  ferrous    sulfate 325 milliGRAM(s) Oral daily  furosemide    Tablet 80 milliGRAM(s) Oral daily  heparin   Injectable 5000 Unit(s) SubCutaneous every 8 hours  hydrALAZINE 25 milliGRAM(s) Oral three times a day  insulin lispro (ADMELOG) corrective regimen sliding scale   SubCutaneous three times a day before meals  levothyroxine 25 MICROGram(s) Oral daily  metoclopramide 10 milliGRAM(s) Oral three times a day  metolazone 10 milliGRAM(s) Oral daily  metoprolol succinate ER 25 milliGRAM(s) Oral daily  pantoprazole    Tablet 40 milliGRAM(s) Oral before breakfast  sevelamer carbonate 800 milliGRAM(s) Oral three times a day with meals  simvastatin 40 milliGRAM(s) Oral at bedtime  sucralfate suspension 1 Gram(s) Oral four times a day      MEDICATIONS  (PRN):  acetaminophen     Tablet .. 650 milliGRAM(s) Oral every 6 hours PRN Temp greater or equal to 38C (100.4F), Mild Pain (1 - 3)  oxyCODONE    IR 5 milliGRAM(s) Oral every 8 hours PRN Severe Pain (7 - 10)  zolpidem 5 milliGRAM(s) Oral at bedtime PRN Insomnia       Medications up to date at time of exam.      PHYSICAL EXAMINATION:  Patient has no new complaints.  GENERAL: The patient is a well-developed, well-nourished, in no apparent distress.     Vital Signs Last 24 Hrs  T(C): 37.1 (17 Mar 2023 14:04), Max: 37.1 (17 Mar 2023 14:04)  T(F): 98.8 (17 Mar 2023 14:04), Max: 98.8 (17 Mar 2023 14:04)  HR: 89 (17 Mar 2023 14:04) (85 - 98)  BP: 144/89 (17 Mar 2023 14:04) (136/69 - 161/84)  BP(mean): --  RR: 18 (17 Mar 2023 14:04) (17 - 19)  SpO2: 94% (17 Mar 2023 14:04) (92% - 100%)    Parameters below as of 17 Mar 2023 14:04  Patient On (Oxygen Delivery Method): room air       (if applicable)    Chest Tube (if applicable)    HEENT: Head is normocephalic and atraumatic. Extraocular muscles are intact. Mucous membranes are moist.     NECK: Supple, no palpable adenopathy.    LUNGS: Clear to auscultation, no wheezing, rales, or rhonchi.    HEART: Regular rate and rhythm without murmur.    ABDOMEN: Soft, nontender, and nondistended.  No hepatosplenomegaly is noted.    : No painful voiding, no pelvic pain    EXTREMITIES: Without any cyanosis, clubbing, rash, lesions or edema.    NEUROLOGIC: Awake, alert, oriented, grossly intact    SKIN: Warm, dry, good turgor.      LABS:                        9.3    2.84  )-----------( 100      ( 17 Mar 2023 08:15 )             30.5     03-17    138  |  99  |  36<H>  ----------------------------<  185<H>  4.6   |  28  |  8.21<H>    Ca    9.1      17 Mar 2023 08:15  Phos  4.1     03-17  Mg     2.8     03-17                          MICROBIOLOGY: (if applicable)    RADIOLOGY & ADDITIONAL STUDIES:  EKG:   CXR:  ECHO:    IMPRESSION: 62y Male 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      Osteoarthritis      Vision loss of left eye  blind      ESRD on hemodialysis  T/Th/S      Cataract  both eyes - hx of sx done      BPH (benign prostatic hyperplasia)      UTI (urinary tract infection)  hx of      Bladder mass  hx of      H/O hematuria      Osteoporosis      Vision loss of right eye  decreased      Depression      Chronic GERD      Osteomyelitis of vertebra      CHF (congestive heart failure)      Below knee amputation status, left  2012- pt is wearing prostesis      History of right cataract extraction      History of left cataract extraction      S/P arteriovenous (AV) fistula creation  right arm brachiocephalic arteriovenous fistula on 11/08/2018      H/O hematuria  s/p bladder bx and fulguration 2/25/2020      H/O transurethral destruction of bladder lesion  2020      History of excision of mass  back mass on 03/31/2021       p/w           RECOMMENDATIONS:   Time of Visit:  Patient seen and examined. pat seen and examined in his room off O2 supp  ( took off NC ) and comfortable     MEDICATIONS  (STANDING):  albuterol/ipratropium for Nebulization 3 milliLiter(s) Nebulizer every 6 hours  ALPRAZolam 0.25 milliGRAM(s) Oral at bedtime  amLODIPine   Tablet 10 milliGRAM(s) Oral daily  budesonide 160 MICROgram(s)/formoterol 4.5 MICROgram(s) Inhaler 2 Puff(s) Inhalation two times a day  calcium carbonate    500 mG (Tums) Chewable 1 Tablet(s) Chew two times a day  chlorhexidine 2% Cloths 1 Application(s) Topical daily  epoetin beverley-epbx (RETACRIT) Injectable 35044 Unit(s) IV Push <User Schedule>  ferrous    sulfate 325 milliGRAM(s) Oral daily  furosemide    Tablet 80 milliGRAM(s) Oral daily  heparin   Injectable 5000 Unit(s) SubCutaneous every 8 hours  hydrALAZINE 25 milliGRAM(s) Oral three times a day  insulin lispro (ADMELOG) corrective regimen sliding scale   SubCutaneous three times a day before meals  levothyroxine 25 MICROGram(s) Oral daily  metoclopramide 10 milliGRAM(s) Oral three times a day  metolazone 10 milliGRAM(s) Oral daily  metoprolol succinate ER 25 milliGRAM(s) Oral daily  pantoprazole    Tablet 40 milliGRAM(s) Oral before breakfast  sevelamer carbonate 800 milliGRAM(s) Oral three times a day with meals  simvastatin 40 milliGRAM(s) Oral at bedtime  sucralfate suspension 1 Gram(s) Oral four times a day      MEDICATIONS  (PRN):  acetaminophen     Tablet .. 650 milliGRAM(s) Oral every 6 hours PRN Temp greater or equal to 38C (100.4F), Mild Pain (1 - 3)  oxyCODONE    IR 5 milliGRAM(s) Oral every 8 hours PRN Severe Pain (7 - 10)  zolpidem 5 milliGRAM(s) Oral at bedtime PRN Insomnia       Medications up to date at time of exam.      PHYSICAL EXAMINATION:  Patient has no new complaints.  GENERAL: The patient is a well-developed, well-nourished, in no apparent distress.     Vital Signs Last 24 Hrs  T(C): 37.1 (17 Mar 2023 14:04), Max: 37.1 (17 Mar 2023 14:04)  T(F): 98.8 (17 Mar 2023 14:04), Max: 98.8 (17 Mar 2023 14:04)  HR: 89 (17 Mar 2023 14:04) (85 - 98)  BP: 144/89 (17 Mar 2023 14:04) (136/69 - 161/84)  BP(mean): --  RR: 18 (17 Mar 2023 14:04) (17 - 19)  SpO2: 94% (17 Mar 2023 14:04) (92% - 100%)    Parameters below as of 17 Mar 2023 14:04  Patient On (Oxygen Delivery Method): room air       (if applicable)    Chest Tube (if applicable)    HEENT: Head is normocephalic and atraumatic. Extraocular muscles are intact. Mucous membranes are moist.     NECK: Supple, no palpable adenopathy.    LUNGS: Clear to auscultation, no wheezing, rales, or rhonchi.    HEART: Regular rate and rhythm without murmur.    ABDOMEN: Soft, nontender, and nondistended.  No hepatosplenomegaly is noted.    EXTREMITIES: left BKA     NEUROLOGIC: Awake, alert, oriented, grossly intact    SKIN: Warm, dry, good turgor.      LABS:                        9.3    2.84  )-----------( 100      ( 17 Mar 2023 08:15 )             30.5     03-17    138  |  99  |  36<H>  ----------------------------<  185<H>  4.6   |  28  |  8.21<H>    Ca    9.1      17 Mar 2023 08:15  Phos  4.1     03-17  Mg     2.8     03-17                          MICROBIOLOGY: (if applicable)    RADIOLOGY & ADDITIONAL STUDIES:  EKG:   CXR:  ECHO:< from: Transthoracic Echocardiogram (02.24.23 @ 08:51) >    Patient name: KIAN VALERO  YOB: 1960   Age: 62 (M)   MR#: [id number]  Study Date: 2/24/2023  Location: [address] [place], [place] [address]Sonographer: Vignesh Zazueta RDCS  Study quality: Technically good  Referring Physician: Dario De La Torre MD  Blood Pressure: 147/68 mmHg  Height: 168 cm  Weight: 140 kg  BSA: 2.4 m2  ------------------------------------------------------------------------    PROCEDURE: Transthoracic echocardiogram with 2-D, M-Mode  and complete spectral and color flow Doppler.  INDICATION: Heart failure, unspecified (I50.9)  HISTORY:  ------------------------------------------------------------------------  DIMENSIONS:  Dimensions:     Normal Values:  LA:     3.7 cm    2.0 - 4.0 cm  Ao:     3.5 cm    2.0 - 3.8 cm  SEPTUM: 1.2 cm    0.6 - 1.2 cm  PWT:    1.3 cm    0.6 - 1.1 cm  LVIDd:  5.1 cm    3.0 - 5.6 cm  LVIDs:  3.8 cm    1.8 - 4.0 cm      Derived Variables:  LVMI: 106 g/m2  RWT: 0.50  Ejection Fraction Visual Estimate: 45-50 %  Ejection Fraction Lugo: 45 %    ------------------------------------------------------------------------  OBSERVATIONS:  Mitral Valve: Normal mitral valve. Mild mitral  regurgitation.  Aortic Root: Aortic Root: 3.5 cm.    Aortic Valve: Normal trileaflet aortic valve. Trace aortic  regurgitation.  LeftAtrium: Normal left atrium.  LA volume index = 34  cc/m2.  Left Ventricle: Mild global left ventricular systolic  dysfunction. Mild concentric left ventricular hypertrophy.  Grade I diastolic dysfunction (Impaired relaxation, mild).  Right Heart: Normal right atrium. Normal right ventricular  size and systolic function (TAPSE  1.9cm). There is trace  tricuspid regurgitation. Normal pulmonic valve.  Pericardium/PleuraTrivial pericardial effusion is seen.  Hemodynamic: Unable to estimate RVSP.  ------------------------------------------------------------------------  CONCLUSIONS:  1. Normal mitral valve. Mild mitral regurgitation.  2. Normal trileaflet aortic valve. Trace aortic  regurgitation.  3. Aortic Root: 3.5 cm.  4. Normal left atrium.  LA volume index = 34 cc/m2.  5. Mild concentric left ventricular hypertrophy.  6. Mild global left ventricular systolic dysfunction.  7. Grade I diastolic dysfunction (Impaired relaxation,  mild).  8. Normal right atrium.  9. Normal right ventricular size and systolic function  (TAPSE  1.9cm).  10. Unable to estimate RVSP.  11. There is trace tricuspid regurgitation.  12. Normal pulmonic valve.  13. Trivial pericardial effusion is seen.    ------------------------------------------------------------------------  Confirmed on  2/25/2023 - 10:35:55 by Nikki Richardson MD  ------------------------------------------------------------------------    < end of copied text >      IMPRESSION: 62y Male 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      Osteoarthritis      Vision loss of left eye  blind      ESRD on hemodialysis  T/Th/S      Cataract  both eyes - hx of sx done      BPH (benign prostatic hyperplasia)      UTI (urinary tract infection)  hx of      Bladder mass  hx of      H/O hematuria      Osteoporosis      Vision loss of right eye  decreased      Depression      Chronic GERD      Osteomyelitis of vertebra      CHF (congestive heart failure)      Below knee amputation status, left  2012- pt is wearing prostesis      History of right cataract extraction      History of left cataract extraction      S/P arteriovenous (AV) fistula creation  right arm brachiocephalic arteriovenous fistula on 11/08/2018      H/O hematuria  s/p bladder bx and fulguration 2/25/2020      H/O transurethral destruction of bladder lesion  2020      History of excision of mass  back mass on 03/31/2021       p/w         IMP: This is a 62 year old, man   ESRD on dialysis (Tues, Thurs, Sat), DM2, CAD s/p MI, CHF, glaucoma, legally blind, HTN, orthostatic hypotension, hyperparathyroid, hyperkalemia, LS spinal stenosis, osteoarthritis, osteoporosis, PAD, hx of osteomyelitis of thoracic vertebrae, remote hx of crack-cocaine use disorder, PSH of BKA of L leg presents after being found hypoxic at the nursing home. CXR do not show pul edema and no physical finding to suggest CHF     Sugg  - CTPA to evaluate for PE and evaluate of parenchyma   - Pat refuse CTPA complaining of burning and pain when injected with contrast and refused test   - 2 DECHO noted   - V/Q scan   - Compliance with HD   - Add Symbicort 160/ 4.5 q12h   - Out pat pulmo f/u   - PFT as out pat

## 2023-03-17 NOTE — PROGRESS NOTE ADULT - SUBJECTIVE AND OBJECTIVE BOX
DATE OF SERVICE: 03-17-23    Patient denies chest pain or shortness of breath.   Review of symptoms otherwise negative.    MEDICATIONS:  acetaminophen     Tablet .. 650 milliGRAM(s) Oral every 6 hours PRN  albuterol/ipratropium for Nebulization 3 milliLiter(s) Nebulizer every 6 hours  ALPRAZolam 0.25 milliGRAM(s) Oral at bedtime  amLODIPine   Tablet 10 milliGRAM(s) Oral daily  calcium carbonate    500 mG (Tums) Chewable 1 Tablet(s) Chew two times a day  chlorhexidine 2% Cloths 1 Application(s) Topical daily  epoetin beverley-epbx (RETACRIT) Injectable 81251 Unit(s) IV Push <User Schedule>  ferrous    sulfate 325 milliGRAM(s) Oral daily  furosemide    Tablet 80 milliGRAM(s) Oral daily  heparin   Injectable 5000 Unit(s) SubCutaneous every 8 hours  hydrALAZINE 25 milliGRAM(s) Oral three times a day  insulin lispro (ADMELOG) corrective regimen sliding scale   SubCutaneous three times a day before meals  levothyroxine 25 MICROGram(s) Oral daily  metoclopramide 10 milliGRAM(s) Oral three times a day  metolazone 10 milliGRAM(s) Oral daily  metoprolol succinate ER 25 milliGRAM(s) Oral daily  oxyCODONE    IR 5 milliGRAM(s) Oral every 8 hours PRN  pantoprazole    Tablet 40 milliGRAM(s) Oral before breakfast  sevelamer carbonate 800 milliGRAM(s) Oral three times a day with meals  simvastatin 40 milliGRAM(s) Oral at bedtime  sucralfate suspension 1 Gram(s) Oral four times a day  zolpidem 5 milliGRAM(s) Oral at bedtime PRN      LABS:                        8.4    3.14  )-----------( 105      ( 16 Mar 2023 10:30 )             27.1     Hemoglobin: 8.4 g/dL (03-16 @ 10:30)  Hemoglobin: 8.9 g/dL (03-15 @ 03:00)    03-16    138  |  95<L>  |  48<H>  ----------------------------<  104<H>  4.5   |  30  |  11.00<H>    Ca    8.6      16 Mar 2023 10:30  Phos  5.7     03-16  Mg     2.9     03-16    Creatinine Trend: 11.00<--, 8.05<--, 12.50<--, 11.00<--, 13.20<--, 12.40<--    PHYSICAL EXAM:  T(C): 36.8 (03-17-23 @ 06:32), Max: 37.6 (03-16-23 @ 14:27)  HR: 85 (03-17-23 @ 06:32) (85 - 98)  BP: 161/84 (03-17-23 @ 06:32) (137/73 - 166/89)  RR: 19 (03-17-23 @ 06:32) (17 - 20)  SpO2: 100% (03-17-23 @ 06:32) (92% - 100%)  Wt(kg): --    I&O's Summary    16 Mar 2023 07:01  -  17 Mar 2023 07:00  --------------------------------------------------------  IN: 0 mL / OUT: 1300 mL / NET: -1300 mL      GEN: L BKA  HEENT:  (-)icterus (-)pallor  CV: N S1 S2 1/6 DIANA (+)2 Pulses B/l  Resp:  Clear to ausculatation B/L, normal effort  GI: (+) BS Soft, NT, ND  Lymph:  (-)Edema, (-)obvious lymphadenopathy  Skin: Warm to touch, Normal turgor  Psych: Appropriate mood and affect      ECG:  	Sinus 97 BPM, LAD nonspecific T wave abnormality     RADIOLOGY:  CXR:   No infiltrate     ASSESSMENT/PLAN: 	62y Male  PMH of ESRD on dialysis (Tues, Thurs, Sat), DM2, CAD s/p MI, normal LV and RV function no ischemia on stress 4/22 , glaucoma, legally blind, HTN, orthostatic hypotension, hyperparathyroid, hyperkalemia, LS spinal stenosis, osteoarthritis, osteoporosis, PAD, hx of osteomyelitis of thoracic vertebrae, remote hx of crack-cocaine use disorder, PSH of left BKA presents after being found hypoxic at the nursing home.    # NSTEMI  - Mild elevation in the setting of hypoxia and ESRD likely multifactorial.  Hypoxia, elevated cardiac filling pressures.  He has no Chest pain and no injury or ischemia on EKG presentation is inconsistent with ACS  - f/u echo  - Stress test less than 1 year ago revealed normal perfusion     # SOB  - Oupt of proportion to xray findings would consider pulmonary eval as i  suspect a primary pulmonary process  - no clinical CHF    # ESRD   - HD per renal

## 2023-03-17 NOTE — PROGRESS NOTE ADULT - PROBLEM SELECTOR PLAN 3
ESRD on HD TTS at [facility], last dialysed yesterday with UF of 1.6 kilos  s/p HD today  Dr. Mosher following ESRD on HD TTS at [facility], last dialysed yesterday with UF of 1.6 kilos  s/p HD yesterday, next HD tomorrow  Dr. Mosher following

## 2023-03-17 NOTE — PROGRESS NOTE ADULT - PROBLEM SELECTOR PLAN 1
hx of COPD and chronic pleural effusion 2/2 renal failure   p.w hypoxia at nursing home  Afebrile , /94, HR 99, 99% on 4L NC  Trop 516> 444  CXR clear   EKG no new changes  likely 2/2 pulmonary edema and pleural effusion 2/2 renal failure  c/w duonebs  will obtain CTA lung r/o PE (pt high risk)  HD today hx of COPD and chronic pleural effusion 2/2 renal failure   p.w hypoxia at nursing home  Afebrile , /94, HR 99, 99% on 4L NC  Trop 516> 444  CXR clear   EKG no new changes  likely 2/2 pulmonary edema and pleural effusion 2/2 renal failure  c/w duonebs  will obtain CTA lung r/o PE (pt high risk)  Pending Home O2 auth by Mateus Murray

## 2023-03-18 NOTE — PROGRESS NOTE ADULT - SUBJECTIVE AND OBJECTIVE BOX
Time of Visit:  Patient seen and examined. pat sitting side of bed , using foul language and refusing neb tx . no sign of SOB     MEDICATIONS  (STANDING):  albuterol/ipratropium for Nebulization 3 milliLiter(s) Nebulizer every 6 hours  ALPRAZolam 0.25 milliGRAM(s) Oral at bedtime  amLODIPine   Tablet 10 milliGRAM(s) Oral daily  budesonide 160 MICROgram(s)/formoterol 4.5 MICROgram(s) Inhaler 2 Puff(s) Inhalation two times a day  calcium carbonate    500 mG (Tums) Chewable 1 Tablet(s) Chew two times a day  chlorhexidine 2% Cloths 1 Application(s) Topical daily  epoetin beverley-epbx (RETACRIT) Injectable 38100 Unit(s) IV Push <User Schedule>  ferrous    sulfate 325 milliGRAM(s) Oral daily  furosemide    Tablet 80 milliGRAM(s) Oral daily  heparin   Injectable 5000 Unit(s) SubCutaneous every 8 hours  hydrALAZINE 25 milliGRAM(s) Oral three times a day  insulin lispro (ADMELOG) corrective regimen sliding scale   SubCutaneous three times a day before meals  levothyroxine 25 MICROGram(s) Oral daily  metoclopramide 10 milliGRAM(s) Oral three times a day  metolazone 10 milliGRAM(s) Oral daily  metoprolol succinate ER 25 milliGRAM(s) Oral daily  pantoprazole    Tablet 40 milliGRAM(s) Oral before breakfast  sevelamer carbonate 800 milliGRAM(s) Oral three times a day with meals  simvastatin 40 milliGRAM(s) Oral at bedtime  sucralfate suspension 1 Gram(s) Oral four times a day      MEDICATIONS  (PRN):  acetaminophen     Tablet .. 650 milliGRAM(s) Oral every 6 hours PRN Temp greater or equal to 38C (100.4F), Mild Pain (1 - 3)  oxyCODONE    IR 5 milliGRAM(s) Oral every 8 hours PRN Severe Pain (7 - 10)  zolpidem 5 milliGRAM(s) Oral at bedtime PRN Insomnia       Medications up to date at time of exam.      PHYSICAL EXAMINATION:  Patient has no new complaints.  GENERAL: The patient is a well-developed, well-nourished, in no apparent distress.     Vital Signs Last 24 Hrs  T(C): 37 (18 Mar 2023 14:30), Max: 37.8 (17 Mar 2023 21:29)  T(F): 98.6 (18 Mar 2023 14:30), Max: 100.1 (17 Mar 2023 21:29)  HR: 88 (18 Mar 2023 14:30) (83 - 100)  BP: 143/83 (18 Mar 2023 14:30) (141/72 - 161/91)  BP(mean): --  RR: 17 (18 Mar 2023 14:30) (15 - 18)  SpO2: 94% (18 Mar 2023 14:30) (94% - 95%)    Parameters below as of 18 Mar 2023 14:30  Patient On (Oxygen Delivery Method): room air       (if applicable)    Chest Tube (if applicable)    HEENT: Head is normocephalic and atraumatic. Extraocular muscles are intact. Mucous membranes are moist.     NECK: Supple, no palpable adenopathy.    LUNGS: Clear to auscultation, no wheezing, rales, or rhonchi.    HEART: Regular rate and rhythm without murmur.    ABDOMEN: Soft, nontender, and nondistended.  No hepatosplenomegaly is note    EXTREMITIES: Without any cyanosis, clubbing, rash, lesions or edema. L BKA     NEUROLOGIC: Awake, alert, oriented, grossly intact    SKIN: Warm, dry, good turgor.      LABS:                        9.4    4.08  )-----------( 93       ( 18 Mar 2023 18:20 )             30.1     03-18    133<L>  |  97  |  56<H>  ----------------------------<  308<H>  5.3   |  24  |  10.40<H>    Ca    8.3<L>      18 Mar 2023 07:30  Phos  4.0     03-18  Mg     2.8     03-18                          MICROBIOLOGY: (if applicable)    RADIOLOGY & ADDITIONAL STUDIES:  EKG:   CXR:  ECHO:    IMPRESSION: 62y Male 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      Osteoarthritis      Vision loss of left eye  blind      ESRD on hemodialysis  T/Th/S      Cataract  both eyes - hx of sx done      BPH (benign prostatic hyperplasia)      UTI (urinary tract infection)  hx of      Bladder mass  hx of      H/O hematuria      Osteoporosis      Vision loss of right eye  decreased      Depression      Chronic GERD      Osteomyelitis of vertebra      CHF (congestive heart failure)      Below knee amputation status, left  2012- pt is wearing prostesis      History of right cataract extraction      History of left cataract extraction      S/P arteriovenous (AV) fistula creation  right arm brachiocephalic arteriovenous fistula on 11/08/2018      H/O hematuria  s/p bladder bx and fulguration 2/25/2020      H/O transurethral destruction of bladder lesion  2020      History of excision of mass  back mass on 03/31/2021       p/w           IMP: This is a 62 year old, man   ESRD on dialysis (Tues, Thurs, Sat), DM2, CAD s/p MI, CHF, glaucoma, legally blind, HTN, orthostatic hypotension, hyperparathyroid, hyperkalemia, LS spinal stenosis, osteoarthritis, osteoporosis, PAD, hx of osteomyelitis of thoracic vertebrae, remote hx of crack-cocaine use disorder, PSH of BKA of L leg presents after being found hypoxic at the nursing home. CXR do not show pul edema and no physical finding to suggest CHF . I was present at RRT , pat returned from dialysis suite and developed SOB . Pat is not cooperating. Refusing meds. Using foul language . I doubt PE at this time . He has episodes fo sob probable due to bronchospasm . Doubt cardiac issues      Sugg  - CTPA to evaluate for PE and evaluate of parenchyma   - Pat refuse CTPA complaining of burning and pain when injected with contrast and refused test   - 2 DECHO noted   - V/Q scan   - Compliance with HD   - Add Symbicort 160/ 4.5 q12h   - Out pat pulmo f/u   - PFT as out pat      Time of Visit:  Patient seen and examined. pat sitting side of bed , using foul language and refusing neb tx . no sign of SOB     MEDICATIONS  (STANDING):  albuterol/ipratropium for Nebulization 3 milliLiter(s) Nebulizer every 6 hours  ALPRAZolam 0.25 milliGRAM(s) Oral at bedtime  amLODIPine   Tablet 10 milliGRAM(s) Oral daily  budesonide 160 MICROgram(s)/formoterol 4.5 MICROgram(s) Inhaler 2 Puff(s) Inhalation two times a day  calcium carbonate    500 mG (Tums) Chewable 1 Tablet(s) Chew two times a day  chlorhexidine 2% Cloths 1 Application(s) Topical daily  epoetin beverley-epbx (RETACRIT) Injectable 67789 Unit(s) IV Push <User Schedule>  ferrous    sulfate 325 milliGRAM(s) Oral daily  furosemide    Tablet 80 milliGRAM(s) Oral daily  heparin   Injectable 5000 Unit(s) SubCutaneous every 8 hours  hydrALAZINE 25 milliGRAM(s) Oral three times a day  insulin lispro (ADMELOG) corrective regimen sliding scale   SubCutaneous three times a day before meals  levothyroxine 25 MICROGram(s) Oral daily  metoclopramide 10 milliGRAM(s) Oral three times a day  metolazone 10 milliGRAM(s) Oral daily  metoprolol succinate ER 25 milliGRAM(s) Oral daily  pantoprazole    Tablet 40 milliGRAM(s) Oral before breakfast  sevelamer carbonate 800 milliGRAM(s) Oral three times a day with meals  simvastatin 40 milliGRAM(s) Oral at bedtime  sucralfate suspension 1 Gram(s) Oral four times a day      MEDICATIONS  (PRN):  acetaminophen     Tablet .. 650 milliGRAM(s) Oral every 6 hours PRN Temp greater or equal to 38C (100.4F), Mild Pain (1 - 3)  oxyCODONE    IR 5 milliGRAM(s) Oral every 8 hours PRN Severe Pain (7 - 10)  zolpidem 5 milliGRAM(s) Oral at bedtime PRN Insomnia       Medications up to date at time of exam.      PHYSICAL EXAMINATION:  Patient has no new complaints.  GENERAL: The patient is a well-developed, well-nourished, in no apparent distress.     Vital Signs Last 24 Hrs  T(C): 37 (18 Mar 2023 14:30), Max: 37.8 (17 Mar 2023 21:29)  T(F): 98.6 (18 Mar 2023 14:30), Max: 100.1 (17 Mar 2023 21:29)  HR: 88 (18 Mar 2023 14:30) (83 - 100)  BP: 143/83 (18 Mar 2023 14:30) (141/72 - 161/91)  BP(mean): --  RR: 17 (18 Mar 2023 14:30) (15 - 18)  SpO2: 94% (18 Mar 2023 14:30) (94% - 95%)    Parameters below as of 18 Mar 2023 14:30  Patient On (Oxygen Delivery Method): room air       (if applicable)    Chest Tube (if applicable)    HEENT: Head is normocephalic and atraumatic. Extraocular muscles are intact. Mucous membranes are moist.     NECK: Supple, no palpable adenopathy.    LUNGS: Clear to auscultation, no wheezing, rales, or rhonchi.    HEART: Regular rate and rhythm without murmur.    ABDOMEN: Soft, nontender, and nondistended.  No hepatosplenomegaly is note    EXTREMITIES: Without any cyanosis, clubbing, rash, lesions or edema. L BKA     NEUROLOGIC: Awake, alert, oriented, grossly intact    SKIN: Warm, dry, good turgor.      LABS:                        9.4    4.08  )-----------( 93       ( 18 Mar 2023 18:20 )             30.1     03-18    133<L>  |  97  |  56<H>  ----------------------------<  308<H>  5.3   |  24  |  10.40<H>    Ca    8.3<L>      18 Mar 2023 07:30  Phos  4.0     03-18  Mg     2.8     03-18                          MICROBIOLOGY: (if applicable)    RADIOLOGY & ADDITIONAL STUDIES:  EKG:   CXR:  ECHO:    IMPRESSION: 62y Male 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      Osteoarthritis      Vision loss of left eye  blind      ESRD on hemodialysis  T/Th/S      Cataract  both eyes - hx of sx done      BPH (benign prostatic hyperplasia)      UTI (urinary tract infection)  hx of      Bladder mass  hx of      H/O hematuria      Osteoporosis      Vision loss of right eye  decreased      Depression      Chronic GERD      Osteomyelitis of vertebra      CHF (congestive heart failure)      Below knee amputation status, left  2012- pt is wearing prostesis      History of right cataract extraction      History of left cataract extraction      S/P arteriovenous (AV) fistula creation  right arm brachiocephalic arteriovenous fistula on 11/08/2018      H/O hematuria  s/p bladder bx and fulguration 2/25/2020      H/O transurethral destruction of bladder lesion  2020      History of excision of mass  back mass on 03/31/2021       p/w           IMP: This is a 62 year old, man   ESRD on dialysis (Tues, Thurs, Sat), DM2, CAD s/p MI, CHF, glaucoma, legally blind, HTN, orthostatic hypotension, hyperparathyroid, hyperkalemia, LS spinal stenosis, osteoarthritis, osteoporosis, PAD, hx of osteomyelitis of thoracic vertebrae, remote hx of crack-cocaine use disorder, PSH of BKA of L leg presents after being found hypoxic at the nursing home. CXR do not show pul edema and no physical finding to suggest CHF . I was present at RRT , pat returned from dialysis suite and developed SOB . Pat is not cooperating. Refusing meds. Using foul language . I doubt PE at this time . He has episodes fo sob probable due to bronchospasm . Doubt cardiac issues      Sugg  - CTPA to evaluate for PE and evaluate of parenchyma   - Pat refuse CTPA complaining of burning and pain when injected with contrast and refused test   - 2 DECHO noted   - V/Q scan   - Compliance with HD   - Add Symbicort 160/ 4.5 q12h   - Out pat pulmo f/u   - PFT as out pat   - tele  - Serial trop   - CXR

## 2023-03-18 NOTE — PROGRESS NOTE ADULT - ASSESSMENT
Patient is a 62y Male whom presented to the hospital with hypoxia.  He has ESRD on HD TTS at [facility], last dialysed yesterday with UF of 1.6 kilos. Presented to ED yesterday with vomitting and generalized weakness.  upon discharge, was noted to be hypoxic sats 88% on RA, thus re-sent to ED. sats was 99% on oxygen. a CXR did not reveal any pulmonary edema.  at time of exam, he denies any SOB and is talking in complete sentences off oxygen. had an episode of vomitting in ED.  renal consulted for ESRD.    # ESRD.  s/p HD today -> cut treatment  appears euvolemic, lytes OK  # hypoxia. awaiting CTA chest    # anemia of CKD. epogen on HD   # CKDMBD  PHOS ELEVATED.  SEVELAMER   D/C planning     For any question, call:  Cell # [phone]  Pager # [phone]  Callback # [phone]

## 2023-03-18 NOTE — PROGRESS NOTE ADULT - SUBJECTIVE AND OBJECTIVE BOX
DATE OF SERVICE: 03-18-23    Patient denies chest pain or shortness of breath.   Review of symptoms otherwise negative.    MEDICATIONS:  acetaminophen     Tablet .. 650 milliGRAM(s) Oral every 6 hours PRN  albuterol/ipratropium for Nebulization 3 milliLiter(s) Nebulizer every 6 hours  ALPRAZolam 0.25 milliGRAM(s) Oral at bedtime  amLODIPine   Tablet 10 milliGRAM(s) Oral daily  budesonide 160 MICROgram(s)/formoterol 4.5 MICROgram(s) Inhaler 2 Puff(s) Inhalation two times a day  calcium carbonate    500 mG (Tums) Chewable 1 Tablet(s) Chew two times a day  chlorhexidine 2% Cloths 1 Application(s) Topical daily  epoetin beverley-epbx (RETACRIT) Injectable 33171 Unit(s) IV Push <User Schedule>  ferrous    sulfate 325 milliGRAM(s) Oral daily  furosemide    Tablet 80 milliGRAM(s) Oral daily  heparin   Injectable 5000 Unit(s) SubCutaneous every 8 hours  hydrALAZINE 25 milliGRAM(s) Oral three times a day  insulin lispro (ADMELOG) corrective regimen sliding scale   SubCutaneous three times a day before meals  levothyroxine 25 MICROGram(s) Oral daily  metoclopramide 10 milliGRAM(s) Oral three times a day  metolazone 10 milliGRAM(s) Oral daily  metoprolol succinate ER 25 milliGRAM(s) Oral daily  oxyCODONE    IR 5 milliGRAM(s) Oral every 8 hours PRN  pantoprazole    Tablet 40 milliGRAM(s) Oral before breakfast  sevelamer carbonate 800 milliGRAM(s) Oral three times a day with meals  simvastatin 40 milliGRAM(s) Oral at bedtime  sucralfate suspension 1 Gram(s) Oral four times a day  zolpidem 5 milliGRAM(s) Oral at bedtime PRN    LABS:                        9.3    2.84  )-----------( 100      ( 17 Mar 2023 08:15 )             30.5     Hemoglobin: 9.3 g/dL (03-17 @ 08:15)  Hemoglobin: 8.4 g/dL (03-16 @ 10:30)  Hemoglobin: 8.9 g/dL (03-15 @ 03:00)    03-17    138  |  99  |  36<H>  ----------------------------<  185<H>  4.6   |  28  |  8.21<H>    Ca    9.1      17 Mar 2023 08:15  Phos  4.1     03-17  Mg     2.8     03-17    Creatinine Trend: 8.21<--, 11.00<--, 8.05<--, 12.50<--, 11.00<--, 13.20<--    PHYSICAL EXAM:  T(C): 36.9 (03-18-23 @ 05:14), Max: 37.8 (03-17-23 @ 21:29)  HR: 100 (03-18-23 @ 05:14) (87 - 100)  BP: 161/91 (03-18-23 @ 05:14) (136/69 - 161/91)  RR: 18 (03-18-23 @ 05:14) (18 - 18)  SpO2: 94% (03-18-23 @ 05:14) (92% - 95%)  Wt(kg): --    I&O's Summary    GEN: L BKA  HEENT:  (-)icterus (-)pallor  CV: N S1 S2 1/6 DIANA (+)2 Pulses B/l  Resp:  Clear to ausculatation B/L, normal effort  GI: (+) BS Soft, NT, ND  Lymph:  (-)Edema, (-)obvious lymphadenopathy  Skin: Warm to touch, Normal turgor  Psych: Appropriate mood and affect      ECG:  	Sinus 97 BPM, LAD nonspecific T wave abnormality     RADIOLOGY:  CXR:   No infiltrate     ASSESSMENT/PLAN: 	62y Male  PMH of ESRD on dialysis (Tues, Thurs, Sat), DM2, CAD s/p MI, normal LV and RV function no ischemia on stress 4/22 , glaucoma, legally blind, HTN, orthostatic hypotension, hyperparathyroid, hyperkalemia, LS spinal stenosis, osteoarthritis, osteoporosis, PAD, hx of osteomyelitis of thoracic vertebrae, remote hx of crack-cocaine use disorder, PSH of left BKA presents after being found hypoxic at the nursing home.    # NSTEMI  - Mild elevation in the setting of hypoxia and ESRD likely multifactorial.  He has no Chest pain and no injury or ischemia on EKG presentation is inconsistent with ACS  - Stress test less than 1 year ago revealed normal perfusion     # SOB  - Oupt of proportion to xray findings would consider pulmonary eval as i  suspect a primary pulmonary process  - no clinical CHF  - f/u V/Q scan    # ESRD   - HD per renal    Alysa Garcia MD  Pager: [phone]

## 2023-03-18 NOTE — PROGRESS NOTE ADULT - SUBJECTIVE AND OBJECTIVE BOX
[facility] - POLA Garcia / POLA Doll / AKSHAT Tello/ POLA Maddox/ POLA Young/ ELISA [place] / JARAD Mosher / FLORA Laboy  ---------------------------------------------------------------------------------------------------------------  seen and examined today for ESRD  Interval : NAD  VITALS:  T(F): 98.9 (03-18-23 @ 09:58), Max: 100.1 (03-17-23 @ 21:29)  HR: 93 (03-18-23 @ 09:58)  BP: 141/72 (03-18-23 @ 09:58)  RR: 16 (03-18-23 @ 09:58)  SpO2: 94% (03-18-23 @ 05:14)  Wt(kg): --    Physical Exam :-  Constitutional: NAD  Neck: Supple.  Respiratory: Bilateral equal breath sounds,  Cardiovascular: S1, S2 normal,  Gastrointestinal: Bowel Sounds present, soft, non tender.  Extremities: No edema  Neurological: Alert and Oriented  Psychiatric: Normal mood, normal affect  Data:-  Allergies :   fish (Rash)  liver (Anaphylaxis)  No Known Drug Allergies    Hospital Medications:   MEDICATIONS  (STANDING):  albuterol/ipratropium for Nebulization 3 milliLiter(s) Nebulizer every 6 hours  ALPRAZolam 0.25 milliGRAM(s) Oral at bedtime  amLODIPine   Tablet 10 milliGRAM(s) Oral daily  budesonide 160 MICROgram(s)/formoterol 4.5 MICROgram(s) Inhaler 2 Puff(s) Inhalation two times a day  calcium carbonate    500 mG (Tums) Chewable 1 Tablet(s) Chew two times a day  chlorhexidine 2% Cloths 1 Application(s) Topical daily  epoetin beverley-epbx (RETACRIT) Injectable 57727 Unit(s) IV Push <User Schedule>  ferrous    sulfate 325 milliGRAM(s) Oral daily  furosemide    Tablet 80 milliGRAM(s) Oral daily  heparin   Injectable 5000 Unit(s) SubCutaneous every 8 hours  hydrALAZINE 25 milliGRAM(s) Oral three times a day  insulin lispro (ADMELOG) corrective regimen sliding scale   SubCutaneous three times a day before meals  levothyroxine 25 MICROGram(s) Oral daily  metoclopramide 10 milliGRAM(s) Oral three times a day  metolazone 10 milliGRAM(s) Oral daily  metoprolol succinate ER 25 milliGRAM(s) Oral daily  pantoprazole    Tablet 40 milliGRAM(s) Oral before breakfast  sevelamer carbonate 800 milliGRAM(s) Oral three times a day with meals  simvastatin 40 milliGRAM(s) Oral at bedtime  sucralfate suspension 1 Gram(s) Oral four times a day    03-18    133<L>  |  97  |  56<H>  ----------------------------<  308<H>  5.3   |  24  |  10.40<H>    Ca    8.3<L>      18 Mar 2023 07:30  Phos  4.0     03-18  Mg     2.8     03-18      Creatinine Trend: 10.40 <--, 8.21 <--, 11.00 <--, 8.05 <--                        8.5    3.77  )-----------( 100      ( 18 Mar 2023 07:30 )             27.5

## 2023-03-18 NOTE — RAPID RESPONSE TEAM SUMMARY - NSSITUATIONBACKGROUNDRRT_GEN_ALL_CORE
62 year old, Male, w/ PMH of ESRD on dialysis (Tues, Thurs, Sat), DM2, CAD s/p MI, CHF, glaucoma, legally blind, HTN, orthostatic hypotension, hyperparathyroid, hyperkalemia, LS spinal stenosis, osteoarthritis, osteoporosis, PAD, hx of osteomyelitis of thoracic vertebrae, remote hx of crack-cocaine use disorder, PSH of BKA of L leg presents after being found hypoxic at the nursing home. Patient admitted for AHRF likely secondary to pulmonary edema and pleural effusion secondary to renal failure. RRT called on 5:30PM 3/18/23 for dyspnea and increased work of breathing. Nebulizer treatment started prior to RRT. VS /96, Spo2 99% on room air . Bilateral crackles and wheezing heard on lung auscultation. Patient given 60mg IV push lasix, 40mg IV push Solumedrol, and 2mg IV push morphine sulfate. Patient denied any chest pain. CBC, CMP, Trop, EKG, and chest xray ordered. Patient dyspnea improved after nebulizer treatment. Will follow up results.  62 year old, Male, w/ PMH of ESRD on dialysis (Tues, Thurs, Sat), DM2, CAD s/p MI, CHF, glaucoma, legally blind, HTN, orthostatic hypotension, hyperparathyroid, hyperkalemia, LS spinal stenosis, osteoarthritis, osteoporosis, PAD, hx of osteomyelitis of thoracic vertebrae, remote hx of crack-cocaine use disorder, PSH of BKA of L leg presents after being found hypoxic at the nursing home. Patient admitted for AHRF likely secondary to pulmonary edema and pleural effusion secondary to renal failure. Patient was scheduled for V/Q scan to rule out PE but refused.  RRT called on 5:30PM 3/18/23 for dyspnea and increased work of breathing. Nebulizer treatment started prior to RRT. VS /96, Spo2 99% on room air . Bilateral crackles and wheezing heard on lung auscultation. Patient given 60mg IV push lasix, 40mg IV push Solumedrol, and 2mg IV push morphine sulfate. Patient denied any chest pain. CBC, CMP, Trop, EKG, and chest xray ordered. Patient dyspnea improved after nebulizer treatment. Will follow up results.

## 2023-03-18 NOTE — PROGRESS NOTE ADULT - SUBJECTIVE AND OBJECTIVE BOX
`Patient is a 62y old  Male who presents with a chief complaint of hypoxia (15 Mar 2023 15:14)    PATIENT IS SEEN AND EXAMINED IN MEDICAL FLOOR.  NGT [    ]    JEREMY [   ]      GT [   ]    ALLERGIES:  fish (Rash)  liver (Anaphylaxis)  No Known Drug Allergies      Daily     Daily Weight in k.2 (18 Mar 2023 09:58)    VITALS:    Vital Signs Last 24 Hrs  T(C): 37.2 (18 Mar 2023 09:58), Max: 37.8 (17 Mar 2023 21:29)  T(F): 98.9 (18 Mar 2023 09:58), Max: 100.1 (17 Mar 2023 21:29)  HR: 93 (18 Mar 2023 09:58) (89 - 100)  BP: 141/72 (18 Mar 2023 09:58) (141/72 - 161/91)  BP(mean): --  RR: 16 (18 Mar 2023 09:58) (16 - 18)  SpO2: 94% (18 Mar 2023 05:14) (94% - 95%)    Parameters below as of 18 Mar 2023 09:58  Patient On (Oxygen Delivery Method): room air        LABS:    CBC Full  -  ( 18 Mar 2023 07:30 )  WBC Count : 3.77 K/uL  RBC Count : 2.91 M/uL  Hemoglobin : 8.5 g/dL  Hematocrit : 27.5 %  Platelet Count - Automated : 100 K/uL  Mean Cell Volume : 94.5 fl  Mean Cell Hemoglobin : 29.2 pg  Mean Cell Hemoglobin Concentration : 30.9 gm/dL  Auto Neutrophil # : x  Auto Lymphocyte # : x  Auto Monocyte # : x  Auto Eosinophil # : x  Auto Basophil # : x  Auto Neutrophil % : x  Auto Lymphocyte % : x  Auto Monocyte % : x  Auto Eosinophil % : x  Auto Basophil % : x      03-18    133<L>  |  97  |  56<H>  ----------------------------<  308<H>  5.3   |  24  |  10.40<H>    Ca    8.3<L>      18 Mar 2023 07:30  Phos  4.0     03-18  Mg     2.8     03-18      CAPILLARY BLOOD GLUCOSE      POCT Blood Glucose.: 95 mg/dL (18 Mar 2023 10:46)  POCT Blood Glucose.: 310 mg/dL (18 Mar 2023 07:49)  POCT Blood Glucose.: 175 mg/dL (17 Mar 2023 21:58)  POCT Blood Glucose.: 263 mg/dL (17 Mar 2023 17:21)  POCT Blood Glucose.: 77 mg/dL (17 Mar 2023 11:30)  POCT Blood Glucose.: 71 mg/dL (17 Mar 2023 11:28)          Creatinine Trend: 10.40<--, 8.21<--, 11.00<--, 8.05<--, 12.50<--, 11.00<--  I&O's Summary          .Blood Blood-Peripheral   @ 00:11   No Growth Final  --  --          MEDICATIONS:    MEDICATIONS  (STANDING):  albuterol/ipratropium for Nebulization 3 milliLiter(s) Nebulizer every 6 hours  ALPRAZolam 0.25 milliGRAM(s) Oral at bedtime  amLODIPine   Tablet 10 milliGRAM(s) Oral daily  budesonide 160 MICROgram(s)/formoterol 4.5 MICROgram(s) Inhaler 2 Puff(s) Inhalation two times a day  calcium carbonate    500 mG (Tums) Chewable 1 Tablet(s) Chew two times a day  chlorhexidine 2% Cloths 1 Application(s) Topical daily  epoetin beverley-epbx (RETACRIT) Injectable 79088 Unit(s) IV Push <User Schedule>  ferrous    sulfate 325 milliGRAM(s) Oral daily  furosemide    Tablet 80 milliGRAM(s) Oral daily  heparin   Injectable 5000 Unit(s) SubCutaneous every 8 hours  hydrALAZINE 25 milliGRAM(s) Oral three times a day  insulin lispro (ADMELOG) corrective regimen sliding scale   SubCutaneous three times a day before meals  levothyroxine 25 MICROGram(s) Oral daily  metoclopramide 10 milliGRAM(s) Oral three times a day  metolazone 10 milliGRAM(s) Oral daily  metoprolol succinate ER 25 milliGRAM(s) Oral daily  pantoprazole    Tablet 40 milliGRAM(s) Oral before breakfast  sevelamer carbonate 800 milliGRAM(s) Oral three times a day with meals  simvastatin 40 milliGRAM(s) Oral at bedtime  sucralfate suspension 1 Gram(s) Oral four times a day      MEDICATIONS  (PRN):  acetaminophen     Tablet .. 650 milliGRAM(s) Oral every 6 hours PRN Temp greater or equal to 38C (100.4F), Mild Pain (1 - 3)  oxyCODONE    IR 5 milliGRAM(s) Oral every 8 hours PRN Severe Pain (7 - 10)  zolpidem 5 milliGRAM(s) Oral at bedtime PRN Insomnia      REVIEW OF SYSTEMS:                           ALL ROS DONE [ X   ]    CONSTITUTIONAL:  LETHARGIC [   ], FEVER [   ], UNRESPONSIVE [   ]  CVS:  CP  [   ], SOB, [   ], PALPITATIONS [   ], DIZZYNESS [   ]  RS: COUGH [   ], SPUTUM [   ]  GI: ABDOMINAL PAIN [   ], NAUSEA [   ], VOMITINGS [   ], DIARRHEA [   ], CONSTIPATION [   ]  :  DYSURIA [   ], NOCTURIA [   ], INCREASED FREQUENCY [   ], DRIBLING [   ],  SKELETAL: PAINFUL JOINTS [   ], SWOLLEN JOINTS [   ], NECK ACHE [   ], LOW BACK ACHE [   ],  SKIN : ULCERS [   ], RASH [   ], ITCHING [   ]  CNS: HEAD ACHE [   ], DOUBLE VISION [   ], BLURRED VISION [   ], AMS / CONFUSION [   ], SEIZURES [   ], WEAKNESS [   ],TINGLING / NUMBNESS [   ]    PHYSICAL EXAMINATION:  GENERAL APPEARANCE: NO DISTRESS  HEENT:  NO PALLOR, NO  JVD,  NO   NODES, NECK SUPPLE  CVS: S1 +, S2 +,   RS: AEEB,  OCCASIONAL  RALES +,   NO RONCHI  ABD: SOFT, NT, NO, BS +  EXT: NO PE  SKIN: WARM,   SKELETAL:  ROM ACCEPTABLE  CNS:  AAO X    ,   DEFICITS    RADIOLOGY :      ASSESSMENT :     Hypoxemia    No pertinent past medical history    Hypertension    Adrenal insufficiency    CKD (chronic kidney disease)    Anemia    Glaucoma    Coronary artery disease    HLD (hyperlipidemia)    Peripheral vascular disease    Spinal stenosis of lumbosacral region    Hyperparathyroidism    Diabetes mellitus    Diabetic neuropathy    Contracture of hand    Osteoarthritis    Osteoporosis    Vision loss of left eye    ESRD on hemodialysis    Cataract    BPH (benign prostatic hyperplasia)    UTI (urinary tract infection)    Bladder mass    H/O hematuria    Osteoporosis    Vision loss of right eye    Depression    Chronic GERD    Osteomyelitis of vertebra    CHF (congestive heart failure)    No significant past surgical history    Below knee amputation status, left    History of right cataract extraction    History of left cataract extraction    S/P arteriovenous (AV) fistula creation    H/O hematuria    H/O transurethral destruction of bladder lesion    History of excision of mass        PLAN:  HPI:  62 year old, Male, w/ PMH of ESRD on dialysis (Tues, Thurs, Sat), DM2, CAD s/p MI, CHF, glaucoma, legally blind, HTN, orthostatic hypotension, hyperparathyroid, hyperkalemia, LS spinal stenosis, osteoarthritis, osteoporosis, PAD, hx of osteomyelitis of thoracic vertebrae, remote hx of crack-cocaine use disorder, PSH of BKA of L leg presents after being found hypoxic at the nursing home. Per chart review, pt was found hypoxic to 87% on RA. Patient states he currently has no complaints and denies any fever, chills, nausea, vomiting, chest pain, SOB, abdominal pain, palpitations or change in bowel habits.      In the ED:  Afebrile , /94, HR 99, 99% on 4L NC  Trop 516> 444  CXR clear   EKG no new changes   (15 Mar 2023 16:02)    # ACUTE ON CHRONIC HYPOXIC RESPIRATORY FAILURE S/T ? PULMONARY EDEMA + HX OF COPD  # UNCONTROLLED HTN  # ESRD ON HD TTS - W/ HX OF NONCOMPLIANCE    - HYDRALAZINE, METOPROLOL AND NORVASC  - PLACED ON DECADRON AND LEVAQUIN RECENTLY  - SUPPLEMENTAL O2  - NEPHROLOGY CONSULT  - PULMONOLOGY CONSULT  - CARDIOLOGY CONSULT    - PATIENT WILL NEED HOME O2, CM TEAM TO HELP COORDINATION; IS 87% ON ROOM AIR AT REST    - RECOMMENDED CTANGIO CHEST BY PULMONOLOGY TEAM - PATIENT DID NOT TOLERATE. PATIENT VERBALIZED UNDERSTANDING OF RISKS OF DEFERING THIS TEST WHICH INCLUDE BUT ARE NOT LIMITED TO MISSED DIAGNOSIS, WORSENING CLINICAL CONDITION AND DEATH.    - F/U V/Q SCAN      # ELEVATED TROPONINS - IN THE SETTING OF RENAL FAILURE, ? DEMAND ISCHEMIA  - TELEMETRY  - TRENDED TROPONINS  -  ECHO 23 - TRACE AR, CONCENTRIC LVH, G1DD  - CARDIOLOGY CONSULT    # RECURRENT INTRACTABLE NAUSEA/VOMITING  # HISTORY OF ESOPHAGITIS AND DUODENITIS [2021], HX OF GASTROPARESIS W/ EVIDENCE OF THICKENED ESOPHAGUS ON PREVIOUS CT SCAN   - EGD AT [facility] - DEMONSTRATED RETAINED FOOD PRODUCT IN STOMACH, RECOMMENDED FOR GASTRIC EMPTYING STUDY  - DENIES RECENT HEMATEMESIS   - MONITORING HGB, PLACED ON PPI BID  - CARAFATE, PRN ANTIEMETICS  - MONITORING FOR SYMPTOMS  - TOLERATING DIET ; PATIENT REPEATEDLY COUNSELLED TO CHEW FOOD CAUTIOUSLY AND CONSUME SMALL BITES W/ REGULAR CLEARING WITH WATER BETWEEN BITES.      - PATIENT RECENTLY STARTED EVALUATION TO R/O PANCREATIC HEAD MASS, UNDERWENT MRCP, PLANNED FOR OUTPATIENT F/U    # HYPOGLYCEMIC EPISODE, LABILE BLOOD GLUCOSE  # SUSPECTED GASTROPARESIS  # UNDERLYING DM  - SSI + FS  - ENDOCRINOLOGY CONSULT    # ? HEMOCHROMATOSIS  - NOTED FERRITIN, F/U IRON AND TIBC, TRANSFERRIN SAT  - WILL REFER HEPATOLOGY AS OUTPATIENT PENDING ABOVE WORKUP    # PATIENT UNDERGOING OUTPATIENT WORKUP FOR CENTRAL VENOUS STENOSIS THROUGH NEPHROLOGY TEAM    # ANEMIA OF CKD  # HX OF PANCYTOPENIA   - TREND HGB, TRANSFUSION THRESHOLD HGB < 7  - TYPE AND SCREEN  - ON EPO  - DENIES RECENT HEMATEMESIS, MELENA, HEMATOCHEZIA  - HEME/ONC CONSULT    # SEVERE PROTEIN CALORIE MALNUTRITION, FAILURE TO THRIVE - NUTRITIONAL SUPPLEMENT    # HLD  # PARTIALLY BLIND  # S/P LEFT BKA  # HX OF PVD  # LS SPINAL STENOSIS  # GI AND DVT PPX .        `Patient is a 62y old  Male who presents with a chief complaint of hypoxia (15 Mar 2023 15:14)    PATIENT IS SEEN AND EXAMINED IN MEDICAL FLOOR.    ALLERGIES:  fish (Rash)  liver (Anaphylaxis)  No Known Drug Allergies      Daily     Daily Weight in k.2 (18 Mar 2023 09:58)    VITALS:    Vital Signs Last 24 Hrs  T(C): 37.2 (18 Mar 2023 09:58), Max: 37.8 (17 Mar 2023 21:29)  T(F): 98.9 (18 Mar 2023 09:58), Max: 100.1 (17 Mar 2023 21:29)  HR: 93 (18 Mar 2023 09:58) (89 - 100)  BP: 141/72 (18 Mar 2023 09:58) (141/72 - 161/91)  BP(mean): --  RR: 16 (18 Mar 2023 09:58) (16 - 18)  SpO2: 94% (18 Mar 2023 05:14) (94% - 95%)    Parameters below as of 18 Mar 2023 09:58  Patient On (Oxygen Delivery Method): room air        LABS:    CBC Full  -  ( 18 Mar 2023 07:30 )  WBC Count : 3.77 K/uL  RBC Count : 2.91 M/uL  Hemoglobin : 8.5 g/dL  Hematocrit : 27.5 %  Platelet Count - Automated : 100 K/uL  Mean Cell Volume : 94.5 fl  Mean Cell Hemoglobin : 29.2 pg  Mean Cell Hemoglobin Concentration : 30.9 gm/dL  Auto Neutrophil # : x  Auto Lymphocyte # : x  Auto Monocyte # : x  Auto Eosinophil # : x  Auto Basophil # : x  Auto Neutrophil % : x  Auto Lymphocyte % : x  Auto Monocyte % : x  Auto Eosinophil % : x  Auto Basophil % : x      03-18    133<L>  |  97  |  56<H>  ----------------------------<  308<H>  5.3   |  24  |  10.40<H>    Ca    8.3<L>      18 Mar 2023 07:30  Phos  4.0     03-18  Mg     2.8     03-18      CAPILLARY BLOOD GLUCOSE      POCT Blood Glucose.: 95 mg/dL (18 Mar 2023 10:46)  POCT Blood Glucose.: 310 mg/dL (18 Mar 2023 07:49)  POCT Blood Glucose.: 175 mg/dL (17 Mar 2023 21:58)  POCT Blood Glucose.: 263 mg/dL (17 Mar 2023 17:21)  POCT Blood Glucose.: 77 mg/dL (17 Mar 2023 11:30)  POCT Blood Glucose.: 71 mg/dL (17 Mar 2023 11:28)          Creatinine Trend: 10.40<--, 8.21<--, 11.00<--, 8.05<--, 12.50<--, 11.00<--  I&O's Summary          .Blood Blood-Peripheral   @ 00:11   No Growth Final  --  --          MEDICATIONS:    MEDICATIONS  (STANDING):  albuterol/ipratropium for Nebulization 3 milliLiter(s) Nebulizer every 6 hours  ALPRAZolam 0.25 milliGRAM(s) Oral at bedtime  amLODIPine   Tablet 10 milliGRAM(s) Oral daily  budesonide 160 MICROgram(s)/formoterol 4.5 MICROgram(s) Inhaler 2 Puff(s) Inhalation two times a day  calcium carbonate    500 mG (Tums) Chewable 1 Tablet(s) Chew two times a day  chlorhexidine 2% Cloths 1 Application(s) Topical daily  epoetin beverley-epbx (RETACRIT) Injectable 73002 Unit(s) IV Push <User Schedule>  ferrous    sulfate 325 milliGRAM(s) Oral daily  furosemide    Tablet 80 milliGRAM(s) Oral daily  heparin   Injectable 5000 Unit(s) SubCutaneous every 8 hours  hydrALAZINE 25 milliGRAM(s) Oral three times a day  insulin lispro (ADMELOG) corrective regimen sliding scale   SubCutaneous three times a day before meals  levothyroxine 25 MICROGram(s) Oral daily  metoclopramide 10 milliGRAM(s) Oral three times a day  metolazone 10 milliGRAM(s) Oral daily  metoprolol succinate ER 25 milliGRAM(s) Oral daily  pantoprazole    Tablet 40 milliGRAM(s) Oral before breakfast  sevelamer carbonate 800 milliGRAM(s) Oral three times a day with meals  simvastatin 40 milliGRAM(s) Oral at bedtime  sucralfate suspension 1 Gram(s) Oral four times a day      MEDICATIONS  (PRN):  acetaminophen     Tablet .. 650 milliGRAM(s) Oral every 6 hours PRN Temp greater or equal to 38C (100.4F), Mild Pain (1 - 3)  oxyCODONE    IR 5 milliGRAM(s) Oral every 8 hours PRN Severe Pain (7 - 10)  zolpidem 5 milliGRAM(s) Oral at bedtime PRN Insomnia      REVIEW OF SYSTEMS:                           ALL ROS DONE [ X   ]    CONSTITUTIONAL:  LETHARGIC [   ], FEVER [   ], UNRESPONSIVE [   ]  CVS:  CP  [   ], SOB, [   ], PALPITATIONS [   ], DIZZYNESS [   ]  RS: COUGH [   ], SPUTUM [   ]  GI: ABDOMINAL PAIN [   ], NAUSEA [   ], VOMITINGS [   ], DIARRHEA [   ], CONSTIPATION [   ]  :  DYSURIA [   ], NOCTURIA [   ], INCREASED FREQUENCY [   ], DRIBLING [   ],  SKELETAL: PAINFUL JOINTS [   ], SWOLLEN JOINTS [   ], NECK ACHE [   ], LOW BACK ACHE [   ],  SKIN : ULCERS [   ], RASH [   ], ITCHING [   ]  CNS: HEAD ACHE [   ], DOUBLE VISION [   ], BLURRED VISION [   ], AMS / CONFUSION [   ], SEIZURES [   ], WEAKNESS [   ],TINGLING / NUMBNESS [   ]    PHYSICAL EXAMINATION:  GENERAL APPEARANCE: NO DISTRESS  HEENT:  NO PALLOR, NO  JVD,  NO   NODES, NECK SUPPLE  CVS: S1 +, S2 +,   RS: AEEB,  OCCASIONAL  RALES +,   CRACKLES+ AT LUNG BASES  ABD: SOFT, NT, NO, BS +  EXT: LEFT AKA  SKIN: WARM,   SKELETAL:  ROM ACCEPTABLE  CNS:  AAO X  3    RADIOLOGY :    RADIOLOGY AND READINGS REVIEWED    ASSESSMENT :     Hypoxemia    No pertinent past medical history    Hypertension    Adrenal insufficiency    CKD (chronic kidney disease)    Anemia    Glaucoma    Coronary artery disease    HLD (hyperlipidemia)    Peripheral vascular disease    Spinal stenosis of lumbosacral region    Hyperparathyroidism    Diabetes mellitus    Diabetic neuropathy    Contracture of hand    Osteoarthritis    Osteoporosis    Vision loss of left eye    ESRD on hemodialysis    Cataract    BPH (benign prostatic hyperplasia)    UTI (urinary tract infection)    Bladder mass    H/O hematuria    Osteoporosis    Vision loss of right eye    Depression    Chronic GERD    Osteomyelitis of vertebra    CHF (congestive heart failure)    No significant past surgical history    Below knee amputation status, left    History of right cataract extraction    History of left cataract extraction    S/P arteriovenous (AV) fistula creation    H/O hematuria    H/O transurethral destruction of bladder lesion    History of excision of mass        PLAN:  HPI:  62 year old, Male, w/ PMH of ESRD on dialysis (Tues, Thurs, Sat), DM2, CAD s/p MI, CHF, glaucoma, legally blind, HTN, orthostatic hypotension, hyperparathyroid, hyperkalemia, LS spinal stenosis, osteoarthritis, osteoporosis, PAD, hx of osteomyelitis of thoracic vertebrae, remote hx of crack-cocaine use disorder, PSH of BKA of L leg presents after being found hypoxic at the nursing home. Per chart review, pt was found hypoxic to 87% on RA. Patient states he currently has no complaints and denies any fever, chills, nausea, vomiting, chest pain, SOB, abdominal pain, palpitations or change in bowel habits.      In the ED:  Afebrile , /94, HR 99, 99% on 4L NC  Trop 516> 444  CXR clear   EKG no new changes   (15 Mar 2023 16:02)\    # RAPID RESPONSE S/T EPISODE OF HYPOXIA  - RESOLVED WITH DUONEB ADMINISTRATION  - NOTED EKG, F/U CXR  - F/U TROPONINS  - EVALUATED BY RAPID RESPONSE TEAM    # ACUTE ON CHRONIC HYPOXIC RESPIRATORY FAILURE S/T ? PULMONARY EDEMA + HX OF COPD  # UNCONTROLLED HTN  # ESRD ON HD TTS - W/ HX OF NONCOMPLIANCE    - HYDRALAZINE, METOPROLOL AND NORVASC  - PLACED ON DECADRON AND LEVAQUIN RECENTLY  - SUPPLEMENTAL O2  - NEPHROLOGY CONSULT  - PULMONOLOGY CONSULT  - CARDIOLOGY CONSULT    - PATIENT WILL NEED HOME O2, CM TEAM TO HELP COORDINATION; IS 87% ON ROOM AIR AT REST    - RECOMMENDED CTANGIO CHEST BY PULMONOLOGY TEAM - PATIENT DID NOT TOLERATE. PATIENT VERBALIZED UNDERSTANDING OF RISKS OF DEFERING THIS TEST WHICH INCLUDE BUT ARE NOT LIMITED TO MISSED DIAGNOSIS, WORSENING CLINICAL CONDITION AND DEATH.    - F/U V/Q SCAN      # ELEVATED TROPONINS - IN THE SETTING OF RENAL FAILURE, ? DEMAND ISCHEMIA  - TELEMETRY  - TRENDED TROPONINS  -  ECHO 23 - TRACE AR, CONCENTRIC LVH, G1DD  - CARDIOLOGY CONSULT    # RECURRENT INTRACTABLE NAUSEA/VOMITING  # HISTORY OF ESOPHAGITIS AND DUODENITIS [2021], HX OF GASTROPARESIS W/ EVIDENCE OF THICKENED ESOPHAGUS ON PREVIOUS CT SCAN   - EGD AT [facility] - DEMONSTRATED RETAINED FOOD PRODUCT IN STOMACH, RECOMMENDED FOR GASTRIC EMPTYING STUDY  - DENIES RECENT HEMATEMESIS   - MONITORING HGB, PLACED ON PPI BID  - CARAFATE, PRN ANTIEMETICS  - MONITORING FOR SYMPTOMS  - TOLERATING DIET ; PATIENT REPEATEDLY COUNSELLED TO CHEW FOOD CAUTIOUSLY AND CONSUME SMALL BITES W/ REGULAR CLEARING WITH WATER BETWEEN BITES.      - PATIENT RECENTLY STARTED EVALUATION TO R/O PANCREATIC HEAD MASS, UNDERWENT MRCP, PLANNED FOR OUTPATIENT F/U    # HYPOGLYCEMIC EPISODE, LABILE BLOOD GLUCOSE  # SUSPECTED GASTROPARESIS  # UNDERLYING DM  - SSI + FS  - ENDOCRINOLOGY CONSULT    # ? HEMOCHROMATOSIS  - NOTED FERRITIN, F/U IRON AND TIBC, TRANSFERRIN SAT  - WILL REFER HEPATOLOGY AS OUTPATIENT PENDING ABOVE WORKUP    # PATIENT UNDERGOING OUTPATIENT WORKUP FOR CENTRAL VENOUS STENOSIS THROUGH NEPHROLOGY TEAM    # ANEMIA OF CKD  # HX OF PANCYTOPENIA   - TREND HGB, TRANSFUSION THRESHOLD HGB < 7  - TYPE AND SCREEN  - ON EPO  - DENIES RECENT HEMATEMESIS, MELENA, HEMATOCHEZIA  - HEME/ONC CONSULT    # SEVERE PROTEIN CALORIE MALNUTRITION, FAILURE TO THRIVE - NUTRITIONAL SUPPLEMENT    # HLD  # PARTIALLY BLIND  # S/P LEFT BKA  # HX OF PVD  # LS SPINAL STENOSIS  # GI AND DVT PPX .

## 2023-03-19 NOTE — PROGRESS NOTE ADULT - NS ATTEND AMEND GEN_ALL_CORE FT
Patient seen and examined. Agree with plan as detailed in PA/NP Note.     Volume removal with HD, f/u V/Q, trop trending down from previous in setting of ESRD      Alysa Garcia MD  Pager: [phone]

## 2023-03-19 NOTE — PROGRESS NOTE ADULT - SUBJECTIVE AND OBJECTIVE BOX
Problem: Adult Inpatient Plan of Care  Goal: Plan of Care Review  Outcome: Ongoing, Progressing     Problem: Diabetes Comorbidity  Goal: Blood Glucose Level Within Targeted Range  Outcome: Ongoing, Progressing     Problem: Pain Acute  Goal: Acceptable Pain Control and Functional Ability  Outcome: Ongoing, Progressing  Intervention: Develop Pain Management Plan  Flowsheets (Taken 2/15/2022 1440)  Pain Management Interventions:   cold applied   position adjusted     Problem: Adjustment to Surgery (Knee Arthroplasty)  Goal: Optimal Coping  Outcome: Ongoing, Progressing     Problem: Bleeding (Knee Arthroplasty)  Goal: Absence of Bleeding  Outcome: Ongoing, Progressing     Problem: Infection (Knee Arthroplasty)  Goal: Absence of Infection Signs and Symptoms  Outcome: Ongoing, Progressing  Intervention: Prevent or Manage Infection  Flowsheets (Taken 2/15/2022 1051)  Fever Reduction/Comfort Measures: fluid intake increased  Infection Management: aseptic technique maintained      [facility] - POLA Garcia / POLA Doll / AKSHAT Tello/ POLA Maddox/ POLA Young/ ELISA [place] / JARAD Mosher / FLORA Laboy  ---------------------------------------------------------------------------------------------------------------  seen and examined today for ESRD  Interval : hyperkalemia this AM  VITALS:  T(F): 98.2 (03-19-23 @ 10:13), Max: 98.8 (03-19-23 @ 05:29)  HR: 87 (03-19-23 @ 10:13)  BP: 133/87 (03-19-23 @ 10:13)  RR: 17 (03-19-23 @ 10:13)  SpO2: 94% (03-19-23 @ 10:13)  Wt(kg): --    Physical Exam :-  Constitutional: NAD  Neck: Supple.  Respiratory: Bilateral equal breath sounds,  Cardiovascular: S1, S2 normal,  Gastrointestinal: Bowel Sounds present, soft, non tender.  Extremities: No edema, left BKA  Neurological: Alert and Oriented  Psychiatric: Normal mood, normal affect  Data:-  Allergies :   fish (Rash)  liver (Anaphylaxis)  No Known Drug Allergies    Hospital Medications:   MEDICATIONS  (STANDING):  albuterol/ipratropium for Nebulization 3 milliLiter(s) Nebulizer every 6 hours  ALPRAZolam 0.25 milliGRAM(s) Oral at bedtime  amLODIPine   Tablet 10 milliGRAM(s) Oral daily  budesonide 160 MICROgram(s)/formoterol 4.5 MICROgram(s) Inhaler 2 Puff(s) Inhalation two times a day  calcium carbonate    500 mG (Tums) Chewable 1 Tablet(s) Chew two times a day  chlorhexidine 2% Cloths 1 Application(s) Topical daily  epoetin beverley-epbx (RETACRIT) Injectable 32266 Unit(s) IV Push <User Schedule>  ferrous    sulfate 325 milliGRAM(s) Oral daily  furosemide    Tablet 80 milliGRAM(s) Oral daily  heparin   Injectable 5000 Unit(s) SubCutaneous every 8 hours  hydrALAZINE 25 milliGRAM(s) Oral three times a day  insulin glargine Injectable (LANTUS) 4 Unit(s) SubCutaneous at bedtime  insulin lispro (ADMELOG) corrective regimen sliding scale   SubCutaneous at bedtime  insulin lispro (ADMELOG) corrective regimen sliding scale   SubCutaneous three times a day before meals  levothyroxine 25 MICROGram(s) Oral daily  metoclopramide 10 milliGRAM(s) Oral three times a day  metolazone 10 milliGRAM(s) Oral daily  metoprolol succinate ER 25 milliGRAM(s) Oral daily  pantoprazole    Tablet 40 milliGRAM(s) Oral before breakfast  sevelamer carbonate 800 milliGRAM(s) Oral three times a day with meals  simvastatin 40 milliGRAM(s) Oral at bedtime  sodium zirconium cyclosilicate 10 Gram(s) Oral every 12 hours  sucralfate suspension 1 Gram(s) Oral four times a day    03-19    134<L>  |  98  |  52<H>  ----------------------------<  328<H>  5.6<H>   |  28  |  7.87<H>    Ca    8.5      19 Mar 2023 06:40  Phos  1.7     03-19  Mg     2.6     03-19    TPro  7.0  /  Alb  3.2<L>  /  TBili  0.5  /  DBili      /  AST  26  /  ALT  16  /  AlkPhos  77  03-18    Creatinine Trend: 7.87 <--, 6.65 <--, 10.40 <--, 8.21 <--, 11.00 <--, 8.05 <--                        8.6    4.35  )-----------( 89       ( 19 Mar 2023 06:40 )             27.5

## 2023-03-19 NOTE — PROGRESS NOTE ADULT - PROBLEM SELECTOR PLAN 1
hx of COPD and chronic pleural effusion 2/2 renal failure   p.w hypoxia at nursing home  Afebrile , /94, HR 99, 99% on 4L NC  Trop 516> 444> 176> 118  CXR clear   EKG no new changes  likely 2/2 pulmonary edema and pleural effusion 2/2 renal failure  Pt could not tolerate CTA due to burning contrast, will obtain V/Q Scan  c/w duonebs  Pending Home O2 auth by Mateus Murray

## 2023-03-19 NOTE — PROGRESS NOTE ADULT - PROBLEM SELECTOR PLAN 3
ESRD on HD TTS at [facility], last dialysed yesterday with UF of 1.6 kilos  s/p HD yesterday, next HD Tuesday  Dr. Mosher following

## 2023-03-19 NOTE — PROGRESS NOTE ADULT - ASSESSMENT
Patient is a 62y Male whom presented to the hospital with hypoxia.  He has ESRD on HD TTS at [facility], last dialysed yesterday with UF of 1.6 kilos. Presented to ED yesterday with vomitting and generalized weakness.  upon discharge, was noted to be hypoxic sats 88% on RA, thus re-sent to ED. sats was 99% on oxygen. a CXR did not reveal any pulmonary edema.  at time of exam, he denies any SOB and is talking in complete sentences off oxygen. had an episode of vomitting in ED.  renal consulted for ESRD.    # ESRD.  s/p HD today -> cut treatment  appears euvolemic  hyperkalemia today -> agreed with Bing  # hypoxia. per pulmo recs  # anemia of CKD. epogen on HD   # CKDMBD  PHOS ELEVATED.  SEVELAMER   D/C planning       For any question, call:  Cell # [phone]  Pager # [phone]  Callback # [phone]

## 2023-03-19 NOTE — PROGRESS NOTE ADULT - ATTENDING COMMENTS
HPI:  62 year old, Male, w/ PMH of ESRD on dialysis (Tues, Thurs, Sat), DM2, CAD s/p MI, CHF, glaucoma, legally blind, HTN, orthostatic hypotension, hyperparathyroid, hyperkalemia, LS spinal stenosis, osteoarthritis, osteoporosis, PAD, hx of osteomyelitis of thoracic vertebrae, remote hx of crack-cocaine use disorder, PSH of BKA of L leg presents after being found hypoxic at the nursing home. Per chart review, pt was found hypoxic to 87% on RA. Patient states he currently has no complaints and denies any fever, chills, nausea, vomiting, chest pain, SOB, abdominal pain, palpitations or change in bowel habits.      In the ED:  Afebrile , /94, HR 99, 99% on 4L NC  Trop 516> 444  CXR clear   EKG no new changes   (15 Mar 2023 16:02)\par  # RAPID RESPONSE S/T EPISODE OF HYPOXIA  - RESOLVED WITH DUONEB ADMINISTRATION  - NOTED EKG, F/U CXR  - F/U TROPONINS  - EVALUATED BY RAPID RESPONSE TEAM    # ACUTE ON CHRONIC HYPOXIC RESPIRATORY FAILURE S/T ? PULMONARY EDEMA + HX OF COPD  # UNCONTROLLED HTN  # ESRD ON HD TTS - W/ HX OF NONCOMPLIANCE    - HYDRALAZINE, METOPROLOL AND NORVASC  - PLACED ON DECADRON AND LEVAQUIN RECENTLY  - SUPPLEMENTAL O2  - NEPHROLOGY CONSULT  - PULMONOLOGY CONSULT  - CARDIOLOGY CONSULT    - PATIENT WILL NEED HOME O2, CM TEAM TO HELP COORDINATION; IS 87% ON ROOM AIR AT REST    - RECOMMENDED CTANGIO CHEST BY PULMONOLOGY TEAM - PATIENT DID NOT TOLERATE. PATIENT VERBALIZED UNDERSTANDING OF RISKS OF DEFERING THIS TEST WHICH INCLUDE BUT ARE NOT LIMITED TO MISSED DIAGNOSIS, WORSENING CLINICAL CONDITION AND DEATH.    - F/U V/Q SCAN      # ELEVATED TROPONINS - IN THE SETTING OF RENAL FAILURE, ? DEMAND ISCHEMIA  - TELEMETRY  - TRENDED TROPONINS  -  ECHO 2/24/23 - TRACE AR, CONCENTRIC LVH, G1DD  - CARDIOLOGY CONSULT    # RECURRENT INTRACTABLE NAUSEA/VOMITING  # HISTORY OF ESOPHAGITIS AND DUODENITIS [1/2021], HX OF GASTROPARESIS W/ EVIDENCE OF THICKENED ESOPHAGUS ON PREVIOUS CT SCAN   - EGD AT [facility] - DEMONSTRATED RETAINED FOOD PRODUCT IN STOMACH, RECOMMENDED FOR GASTRIC EMPTYING STUDY  - DENIES RECENT HEMATEMESIS   - MONITORING HGB, PLACED ON PPI BID  - CARAFATE, PRN ANTIEMETICS  - MONITORING FOR SYMPTOMS  - TOLERATING DIET ; PATIENT REPEATEDLY COUNSELLED TO CHEW FOOD CAUTIOUSLY AND CONSUME SMALL BITES W/ REGULAR CLEARING WITH WATER BETWEEN BITES.      - PATIENT RECENTLY STARTED EVALUATION TO R/O PANCREATIC HEAD MASS, UNDERWENT MRCP, PLANNED FOR OUTPATIENT F/U    # HYPOGLYCEMIC EPISODE, LABILE BLOOD GLUCOSE  # SUSPECTED GASTROPARESIS  # UNDERLYING DM  - SSI + FS  - ENDOCRINOLOGY CONSULT    # ? HEMOCHROMATOSIS  - NOTED FERRITIN, F/U IRON AND TIBC, TRANSFERRIN SAT  - WILL REFER HEPATOLOGY AS OUTPATIENT PENDING ABOVE WORKUP    # PATIENT UNDERGOING OUTPATIENT WORKUP FOR CENTRAL VENOUS STENOSIS THROUGH NEPHROLOGY TEAM    # ANEMIA OF CKD  # HX OF PANCYTOPENIA   - TREND HGB, TRANSFUSION THRESHOLD HGB < 7  - TYPE AND SCREEN  - ON EPO  - DENIES RECENT HEMATEMESIS, MELENA, HEMATOCHEZIA  - HEME/ONC CONSULT    # SEVERE PROTEIN CALORIE MALNUTRITION, FAILURE TO THRIVE - NUTRITIONAL SUPPLEMENT    # HLD  # PARTIALLY BLIND  # S/P LEFT BKA  # HX OF PVD  # LS SPINAL STENOSIS  # GI AND DVT PPX HPI:  62 year old, Male, w/ PMH of ESRD on dialysis (Tues, Thurs, Sat), DM2, CAD s/p MI, CHF, glaucoma, legally blind, HTN, orthostatic hypotension, hyperparathyroid, hyperkalemia, LS spinal stenosis, osteoarthritis, osteoporosis, PAD, hx of osteomyelitis of thoracic vertebrae, remote hx of crack-cocaine use disorder, PSH of BKA of L leg presents after being found hypoxic at the nursing home. Per chart review, pt was found hypoxic to 87% on RA. Patient states he currently has no complaints and denies any fever, chills, nausea, vomiting, chest pain, SOB, abdominal pain, palpitations or change in bowel habits.      In the ED:  Afebrile , /94, HR 99, 99% on 4L NC  Trop 516> 444  CXR clear   EKG no new changes   (15 Mar 2023 16:02)\par    # RAPID RESPONSE S/T EPISODE OF DYSPNEA [3/18]  - RESOLVED WITH DUONEB ADMINISTRATION  - NOTED EKG, F/U CXR  - F/U TROPONINS  - EVALUATED BY RAPID RESPONSE TEAM    # ACUTE ON CHRONIC HYPOXIC RESPIRATORY FAILURE S/T ? PULMONARY EDEMA + HX OF COPD  # UNCONTROLLED HTN  # ESRD ON HD TTS - W/ HX OF NONCOMPLIANCE    - HYDRALAZINE, METOPROLOL AND NORVASC  - PLACED ON DECADRON AND LEVAQUIN RECENTLY  - SUPPLEMENTAL O2  - NEPHROLOGY CONSULT  - PULMONOLOGY CONSULT  - CARDIOLOGY CONSULT    - PATIENT WILL NEED HOME O2, CM TEAM TO HELP COORDINATION; IS 87% ON ROOM AIR AT REST    - RECOMMENDED CTANGIO CHEST BY PULMONOLOGY TEAM - PATIENT DID NOT TOLERATE. PATIENT VERBALIZED UNDERSTANDING OF RISKS OF DEFERING THIS TEST WHICH INCLUDE BUT ARE NOT LIMITED TO MISSED DIAGNOSIS, WORSENING CLINICAL CONDITION AND DEATH.    - F/U V/Q SCAN      # ELEVATED TROPONINS - IN THE SETTING OF RENAL FAILURE, ? DEMAND ISCHEMIA  - TELEMETRY  - TRENDED TROPONINS  -  ECHO 2/24/23 - TRACE AR, CONCENTRIC LVH, G1DD  - CARDIOLOGY CONSULT    # RECURRENT INTRACTABLE NAUSEA/VOMITING  # HISTORY OF ESOPHAGITIS AND DUODENITIS [1/2021], HX OF GASTROPARESIS W/ EVIDENCE OF THICKENED ESOPHAGUS ON PREVIOUS CT SCAN   - EGD AT [facility] - DEMONSTRATED RETAINED FOOD PRODUCT IN STOMACH, RECOMMENDED FOR GASTRIC EMPTYING STUDY  - DENIES RECENT HEMATEMESIS   - MONITORING HGB, PLACED ON PPI BID  - CARAFATE, PRN ANTIEMETICS  - MONITORING FOR SYMPTOMS  - TOLERATING DIET ; PATIENT REPEATEDLY COUNSELLED TO CHEW FOOD CAUTIOUSLY AND CONSUME SMALL BITES W/ REGULAR CLEARING WITH WATER BETWEEN BITES.      - PATIENT RECENTLY STARTED EVALUATION TO R/O PANCREATIC HEAD MASS, UNDERWENT MRCP, PLANNED FOR OUTPATIENT F/U    # HYPOGLYCEMIC EPISODE, LABILE BLOOD GLUCOSE  # SUSPECTED GASTROPARESIS  # UNDERLYING DM  - SSI + FS  - ENDOCRINOLOGY CONSULT    # ? HEMOCHROMATOSIS  - NOTED FERRITIN, F/U IRON AND TIBC, TRANSFERRIN SAT  - WILL REFER HEPATOLOGY AS OUTPATIENT PENDING ABOVE WORKUP    # PATIENT UNDERGOING OUTPATIENT WORKUP FOR CENTRAL VENOUS STENOSIS THROUGH NEPHROLOGY TEAM    # ANEMIA OF CKD  # HX OF PANCYTOPENIA   - TREND HGB, TRANSFUSION THRESHOLD HGB < 7  - TYPE AND SCREEN  - ON EPO  - DENIES RECENT HEMATEMESIS, MELENA, HEMATOCHEZIA  - HEME/ONC CONSULT    # SEVERE PROTEIN CALORIE MALNUTRITION, FAILURE TO THRIVE - NUTRITIONAL SUPPLEMENT    # HLD  # PARTIALLY BLIND  # S/P LEFT BKA  # HX OF PVD  # LS SPINAL STENOSIS  # GI AND DVT PPX

## 2023-03-19 NOTE — PROGRESS NOTE ADULT - SUBJECTIVE AND OBJECTIVE BOX
PGY-1 Progress Note discussed with attending    PAGER #: [[phone]] TILL 5:00 PM  PLEASE CONTACT ON CALL TEAM:   - On Call Team (Please refer to Dejon) FROM 5:00 PM - 8:30PM  - Nightfloat Team FROM 8:30 -7:30 AM    INTERVAL HPI/OVERNIGHT EVENTS:   Patient had RRT yesterday due to difficulty breathing. He states he felt sweaty and started to have trouble breathing/ choking sensation. States the nebulizer tx helped him. Today he feels much better and is eager to get back to his facility.     REVIEW OF SYSTEMS:  CONSTITUTIONAL: No fever, weight loss, or fatigue  RESPIRATORY: No cough, wheezing, chills or hemoptysis; No shortness of breath  CARDIOVASCULAR: No chest pain, palpitations, dizziness, or leg swelling  GASTROINTESTINAL: No abdominal pain. No nausea, vomiting, or hematemesis; No diarrhea or constipation. No melena or hematochezia.  GENITOURINARY: No dysuria or hematuria, urinary frequency  NEUROLOGICAL: No headaches, memory loss, loss of strength, numbness, or tremors  SKIN: No itching, burning, rashes, or lesions     MEDICATIONS  (STANDING):  albuterol/ipratropium for Nebulization 3 milliLiter(s) Nebulizer every 6 hours  ALPRAZolam 0.25 milliGRAM(s) Oral at bedtime  amLODIPine   Tablet 10 milliGRAM(s) Oral daily  budesonide 160 MICROgram(s)/formoterol 4.5 MICROgram(s) Inhaler 2 Puff(s) Inhalation two times a day  calcium carbonate    500 mG (Tums) Chewable 1 Tablet(s) Chew two times a day  chlorhexidine 2% Cloths 1 Application(s) Topical daily  epoetin beverley-epbx (RETACRIT) Injectable 41764 Unit(s) IV Push <User Schedule>  ferrous    sulfate 325 milliGRAM(s) Oral daily  furosemide    Tablet 80 milliGRAM(s) Oral daily  heparin   Injectable 5000 Unit(s) SubCutaneous every 8 hours  hydrALAZINE 25 milliGRAM(s) Oral three times a day  insulin glargine Injectable (LANTUS) 4 Unit(s) SubCutaneous at bedtime  insulin lispro (ADMELOG) corrective regimen sliding scale   SubCutaneous at bedtime  insulin lispro (ADMELOG) corrective regimen sliding scale   SubCutaneous three times a day before meals  levothyroxine 25 MICROGram(s) Oral daily  metoclopramide 10 milliGRAM(s) Oral three times a day  metolazone 10 milliGRAM(s) Oral daily  metoprolol succinate ER 25 milliGRAM(s) Oral daily  pantoprazole    Tablet 40 milliGRAM(s) Oral before breakfast  sevelamer carbonate 800 milliGRAM(s) Oral three times a day with meals  simvastatin 40 milliGRAM(s) Oral at bedtime  sodium zirconium cyclosilicate 10 Gram(s) Oral every 12 hours  sucralfate suspension 1 Gram(s) Oral four times a day    MEDICATIONS  (PRN):  acetaminophen     Tablet .. 650 milliGRAM(s) Oral every 6 hours PRN Temp greater or equal to 38C (100.4F), Mild Pain (1 - 3)  oxyCODONE    IR 5 milliGRAM(s) Oral every 8 hours PRN Severe Pain (7 - 10)  zolpidem 5 milliGRAM(s) Oral at bedtime PRN Insomnia      Vital Signs Last 24 Hrs  T(C): 37.1 (19 Mar 2023 05:29), Max: 37.2 (18 Mar 2023 09:58)  T(F): 98.8 (19 Mar 2023 05:29), Max: 98.9 (18 Mar 2023 09:58)  HR: 92 (19 Mar 2023 05:29) (83 - 104)  BP: 160/82 (19 Mar 2023 05:29) (141/72 - 166/87)  BP(mean): --  RR: 18 (19 Mar 2023 05:29) (15 - 19)  SpO2: 98% (19 Mar 2023 05:29) (94% - 100%)    Parameters below as of 19 Mar 2023 05:29  Patient On (Oxygen Delivery Method): room air        PHYSICAL EXAMINATION:  GENERAL: NAD, legally blind male  HEAD:  Atraumatic, Normocephalic  EYES:  conjunctiva and sclera clear  NECK: Supple, No JVD, Normal thyroid  CHEST/LUNG: Clear to auscultation. Clear to percussion bilaterally; No rales, rhonchi, wheezing, or rubs  HEART: Regular rate and rhythm; No murmurs, rubs, or gallops  ABDOMEN: Soft, Nontender, Nondistended; Bowel sounds present  NERVOUS SYSTEM:  Alert & Oriented X3  EXTREMITIES:  2+ Peripheral Pulses, No clubbing, cyanosis, or edema. + LLE BKA, +RUE AV fistula  SKIN: warm dry                          8.6    4.35  )-----------( 89       ( 19 Mar 2023 06:40 )             27.5     03-19    134<L>  |  98  |  52<H>  ----------------------------<  328<H>  5.6<H>   |  28  |  7.87<H>    Ca    8.5      19 Mar 2023 06:40  Phos  1.7     03-19  Mg     2.6     03-19    TPro  7.0  /  Alb  3.2<L>  /  TBili  0.5  /  DBili  x   /  AST  26  /  ALT  16  /  AlkPhos  77  03-18    LIVER FUNCTIONS - ( 18 Mar 2023 18:20 )  Alb: 3.2 g/dL / Pro: 7.0 g/dL / ALK PHOS: 77 U/L / ALT: 16 U/L DA / AST: 26 U/L / GGT: x           CARDIAC MARKERS ( 18 Mar 2023 18:20 )  x     / x     / x     / x     / 6.1 ng/mL        CAPILLARY BLOOD GLUCOSE  POCT Blood Glucose.: 339 mg/dL (19 Mar 2023 07:44)    CAPILLARY BLOOD GLUCOSE  POCT Blood Glucose.: 339 mg/dL (19 Mar 2023 07:44)  POCT Blood Glucose.: 292 mg/dL (18 Mar 2023 22:30)  POCT Blood Glucose.: 225 mg/dL (18 Mar 2023 17:00)  POCT Blood Glucose.: 95 mg/dL (18 Mar 2023 10:46)

## 2023-03-19 NOTE — PROGRESS NOTE ADULT - ASSESSMENT
62 year old, Male, w/ PMH of ESRD on dialysis (Tues, Thurs, Sat), DM2, CAD s/p MI, CHF, glaucoma, legally blind, HTN, orthostatic hypotension, hyperparathyroid, hyperkalemia, LS spinal stenosis, osteoarthritis, osteoporosis, PAD, hx of osteomyelitis of thoracic vertebrae, remote hx of crack-cocaine use disorder, PSH of BKA of L leg presents after being found hypoxic at the nursing home. Patient will be admitted for AHRF 2/2 COPD exacerbation and chronic pleural effusions 2/2 advance renal failure.

## 2023-03-19 NOTE — PROGRESS NOTE ADULT - SUBJECTIVE AND OBJECTIVE BOX
DATE OF SERVICE: 03-19-23    no events overnight, resting in bed in nad       acetaminophen     Tablet .. 650 milliGRAM(s) Oral every 6 hours PRN  albuterol/ipratropium for Nebulization 3 milliLiter(s) Nebulizer every 6 hours  ALPRAZolam 0.25 milliGRAM(s) Oral at bedtime  amLODIPine   Tablet 10 milliGRAM(s) Oral daily  budesonide 160 MICROgram(s)/formoterol 4.5 MICROgram(s) Inhaler 2 Puff(s) Inhalation two times a day  calcium carbonate    500 mG (Tums) Chewable 1 Tablet(s) Chew two times a day  chlorhexidine 2% Cloths 1 Application(s) Topical daily  epoetin beverley-epbx (RETACRIT) Injectable 74254 Unit(s) IV Push <User Schedule>  ferrous    sulfate 325 milliGRAM(s) Oral daily  furosemide    Tablet 80 milliGRAM(s) Oral daily  heparin   Injectable 5000 Unit(s) SubCutaneous every 8 hours  hydrALAZINE 25 milliGRAM(s) Oral three times a day  insulin glargine Injectable (LANTUS) 4 Unit(s) SubCutaneous at bedtime  insulin lispro (ADMELOG) corrective regimen sliding scale   SubCutaneous at bedtime  insulin lispro (ADMELOG) corrective regimen sliding scale   SubCutaneous three times a day before meals  levothyroxine 25 MICROGram(s) Oral daily  metoclopramide 10 milliGRAM(s) Oral three times a day  metolazone 10 milliGRAM(s) Oral daily  metoprolol succinate ER 25 milliGRAM(s) Oral daily  oxyCODONE    IR 5 milliGRAM(s) Oral every 8 hours PRN  pantoprazole    Tablet 40 milliGRAM(s) Oral before breakfast  sevelamer carbonate 800 milliGRAM(s) Oral three times a day with meals  simvastatin 40 milliGRAM(s) Oral at bedtime  sodium zirconium cyclosilicate 10 Gram(s) Oral every 12 hours  sucralfate suspension 1 Gram(s) Oral four times a day  zolpidem 5 milliGRAM(s) Oral at bedtime PRN                            8.6    4.35  )-----------( 89       ( 19 Mar 2023 06:40 )             27.5       Hemoglobin: 8.6 g/dL (03-19 @ 06:40)  Hemoglobin: 9.4 g/dL (03-18 @ 18:20)  Hemoglobin: 8.5 g/dL (03-18 @ 07:30)  Hemoglobin: 9.3 g/dL (03-17 @ 08:15)  Hemoglobin: 8.4 g/dL (03-16 @ 10:30)      03-19    134<L>  |  98  |  52<H>  ----------------------------<  328<H>  5.6<H>   |  28  |  7.87<H>    Ca    8.5      19 Mar 2023 06:40  Phos  1.7     03-19  Mg     2.6     03-19    TPro  7.0  /  Alb  3.2<L>  /  TBili  0.5  /  DBili  x   /  AST  26  /  ALT  16  /  AlkPhos  77  03-18    Creatinine Trend: 7.87<--, 6.65<--, 10.40<--, 8.21<--, 11.00<--, 8.05<--    COAGS:     CARDIAC MARKERS ( 18 Mar 2023 18:20 )  x     / x     / x     / x     / 6.1 ng/mL        T(C): 37.1 (03-19-23 @ 05:29), Max: 37.2 (03-18-23 @ 09:58)  HR: 92 (03-19-23 @ 05:29) (83 - 104)  BP: 160/82 (03-19-23 @ 05:29) (141/72 - 166/87)  RR: 18 (03-19-23 @ 05:29) (15 - 19)  SpO2: 98% (03-19-23 @ 05:29) (94% - 100%)  Wt(kg): --    I&O's Summary    GEN: L BKA  HEENT:  (-)icterus (-)pallor  CV: N S1 S2 1/6 DIANA (+)2 Pulses B/l  Resp:  Clear to ausculatation B/L, normal effort  GI: (+) BS Soft, NT, ND  Lymph:  (-)Edema, (-)obvious lymphadenopathy  Skin: Warm to touch, Normal turgor  Psych: Appropriate mood and affect      ECG:  	Sinus 97 BPM, LAD nonspecific T wave abnormality     RADIOLOGY:  CXR:   No infiltrate     ASSESSMENT/PLAN: 	62y Male  PMH of ESRD on dialysis (Tues, Thurs, Sat), DM2, CAD s/p MI, normal LV and RV function no ischemia on stress 4/22 , glaucoma, legally blind, HTN, orthostatic hypotension, hyperparathyroid, hyperkalemia, LS spinal stenosis, osteoarthritis, osteoporosis, PAD, hx of osteomyelitis of thoracic vertebrae, remote hx of crack-cocaine use disorder, PSH of left BKA presents after being found hypoxic at the nursing home.    # NSTEMI  - Mild elevation in the setting of hypoxia and ESRD likely multifactorial.  He has no Chest pain and no injury or ischemia on EKG presentation is inconsistent with ACS  - Stress test less than 1 year ago revealed normal perfusion     # SOB  - Oupt of proportion to xray findings would consider pulmonary eval as i  suspect a primary pulmonary process  - no clinical CHF  - f/u V/Q scan    # ESRD   - HD per renal       DATE OF SERVICE: 03-19-23    Rapid yesterday for increased work of breathing improved with lasix, solumedrol and nebulizers, resting in bed in nad       acetaminophen     Tablet .. 650 milliGRAM(s) Oral every 6 hours PRN  albuterol/ipratropium for Nebulization 3 milliLiter(s) Nebulizer every 6 hours  ALPRAZolam 0.25 milliGRAM(s) Oral at bedtime  amLODIPine   Tablet 10 milliGRAM(s) Oral daily  budesonide 160 MICROgram(s)/formoterol 4.5 MICROgram(s) Inhaler 2 Puff(s) Inhalation two times a day  calcium carbonate    500 mG (Tums) Chewable 1 Tablet(s) Chew two times a day  chlorhexidine 2% Cloths 1 Application(s) Topical daily  epoetin beverley-epbx (RETACRIT) Injectable 05259 Unit(s) IV Push <User Schedule>  ferrous    sulfate 325 milliGRAM(s) Oral daily  furosemide    Tablet 80 milliGRAM(s) Oral daily  heparin   Injectable 5000 Unit(s) SubCutaneous every 8 hours  hydrALAZINE 25 milliGRAM(s) Oral three times a day  insulin glargine Injectable (LANTUS) 4 Unit(s) SubCutaneous at bedtime  insulin lispro (ADMELOG) corrective regimen sliding scale   SubCutaneous at bedtime  insulin lispro (ADMELOG) corrective regimen sliding scale   SubCutaneous three times a day before meals  levothyroxine 25 MICROGram(s) Oral daily  metoclopramide 10 milliGRAM(s) Oral three times a day  metolazone 10 milliGRAM(s) Oral daily  metoprolol succinate ER 25 milliGRAM(s) Oral daily  oxyCODONE    IR 5 milliGRAM(s) Oral every 8 hours PRN  pantoprazole    Tablet 40 milliGRAM(s) Oral before breakfast  sevelamer carbonate 800 milliGRAM(s) Oral three times a day with meals  simvastatin 40 milliGRAM(s) Oral at bedtime  sodium zirconium cyclosilicate 10 Gram(s) Oral every 12 hours  sucralfate suspension 1 Gram(s) Oral four times a day  zolpidem 5 milliGRAM(s) Oral at bedtime PRN                            8.6    4.35  )-----------( 89       ( 19 Mar 2023 06:40 )             27.5       Hemoglobin: 8.6 g/dL (03-19 @ 06:40)  Hemoglobin: 9.4 g/dL (03-18 @ 18:20)  Hemoglobin: 8.5 g/dL (03-18 @ 07:30)  Hemoglobin: 9.3 g/dL (03-17 @ 08:15)  Hemoglobin: 8.4 g/dL (03-16 @ 10:30)      03-19    134<L>  |  98  |  52<H>  ----------------------------<  328<H>  5.6<H>   |  28  |  7.87<H>    Ca    8.5      19 Mar 2023 06:40  Phos  1.7     03-19  Mg     2.6     03-19    TPro  7.0  /  Alb  3.2<L>  /  TBili  0.5  /  DBili  x   /  AST  26  /  ALT  16  /  AlkPhos  77  03-18    Creatinine Trend: 7.87<--, 6.65<--, 10.40<--, 8.21<--, 11.00<--, 8.05<--    COAGS:     CARDIAC MARKERS ( 18 Mar 2023 18:20 )  x     / x     / x     / x     / 6.1 ng/mL        T(C): 37.1 (03-19-23 @ 05:29), Max: 37.2 (03-18-23 @ 09:58)  HR: 92 (03-19-23 @ 05:29) (83 - 104)  BP: 160/82 (03-19-23 @ 05:29) (141/72 - 166/87)  RR: 18 (03-19-23 @ 05:29) (15 - 19)  SpO2: 98% (03-19-23 @ 05:29) (94% - 100%)  Wt(kg): --    I&O's Summary    GEN: L BKA  HEENT:  (-)icterus (-)pallor  CV: N S1 S2 1/6 DIANA (+)2 Pulses B/l  Resp:  Clear to ausculatation B/L, normal effort  GI: (+) BS Soft, NT, ND  Lymph:  (-)Edema, (-)obvious lymphadenopathy  Skin: Warm to touch, Normal turgor  Psych: Appropriate mood and affect      ECG:  	Sinus 97 BPM, LAD nonspecific T wave abnormality     RADIOLOGY:  CXR:   No infiltrate     ASSESSMENT/PLAN: 	62y Male  PMH of ESRD on dialysis (Tues, Thurs, Sat), DM2, CAD s/p MI, normal LV and RV function no ischemia on stress 4/22 , glaucoma, legally blind, HTN, orthostatic hypotension, hyperparathyroid, hyperkalemia, LS spinal stenosis, osteoarthritis, osteoporosis, PAD, hx of osteomyelitis of thoracic vertebrae, remote hx of crack-cocaine use disorder, PSH of left BKA presents after being found hypoxic at the nursing home.    # NSTEMI  - Mild elevation in the setting of hypoxia and ESRD likely multifactorial.  He has no Chest pain and no injury or ischemia on EKG presentation is inconsistent with ACS  - Stress test less than 1 year ago revealed normal perfusion     # SOB  - Oupt of proportion to xray findings would consider pulmonary eval as i  suspect a primary pulmonary process  - no clinical CHF  - f/u V/Q scan    # ESRD   - HD per renal

## 2023-03-20 NOTE — PROGRESS NOTE ADULT - PROBLEM SELECTOR PLAN 3
ESRD on HD TTS at [facility], last dialysed yesterday with UF of 1.6 kilos  s/p HD yesterday, next HD Tuesday  Dr. Mosher following

## 2023-03-20 NOTE — PROGRESS NOTE ADULT - PROBLEM SELECTOR PLAN 1
hx of COPD and chronic pleural effusion 2/2 renal failure   p.w hypoxia at nursing home  Afebrile , /94, HR 99, 99% on 4L NC  Trop 516> 444> 176> 118  CXR clear   EKG no new changes  likely 2/2 pulmonary edema and pleural effusion 2/2 renal failure  Pt could not tolerate CTA due to burning contrast, will obtain V/Q Scan  c/w duonebs  Pending Home O2 auth by Mateus Murray hx of COPD and chronic pleural effusion 2/2 renal failure   p.w hypoxia at nursing home  Afebrile, /94, HR 99, 99% on 4L NC  Trop 516> 444> 176> 118  CXR clear   EKG no new changes  likely 2/2 pulmonary edema and pleural effusion 2/2 renal failure  Pt could not tolerate CTA due to burning contrast. Could not tolerate V/Q Scan today.   c/w duonebs  Pending Home O2 auth by Mateus Murray

## 2023-03-20 NOTE — PROGRESS NOTE ADULT - SUBJECTIVE AND OBJECTIVE BOX
C A R D I O L O G Y  **********************************     DATE OF SERVICE: 03-20-23    Patient denies chest pain RRT over the weekend noted  Review of systems otherwise (-)  	  MEDICATIONS:  MEDICATIONS  (STANDING):  albuterol/ipratropium for Nebulization 3 milliLiter(s) Nebulizer every 6 hours  ALPRAZolam 0.25 milliGRAM(s) Oral at bedtime  amLODIPine   Tablet 10 milliGRAM(s) Oral daily  budesonide 160 MICROgram(s)/formoterol 4.5 MICROgram(s) Inhaler 2 Puff(s) Inhalation two times a day  calcium carbonate    500 mG (Tums) Chewable 1 Tablet(s) Chew two times a day  chlorhexidine 2% Cloths 1 Application(s) Topical daily  epoetin beverley-epbx (RETACRIT) Injectable 80662 Unit(s) IV Push <User Schedule>  ferrous    sulfate 325 milliGRAM(s) Oral daily  furosemide    Tablet 80 milliGRAM(s) Oral daily  heparin   Injectable 5000 Unit(s) SubCutaneous every 8 hours  hydrALAZINE 25 milliGRAM(s) Oral three times a day  insulin glargine Injectable (LANTUS) 4 Unit(s) SubCutaneous at bedtime  insulin lispro (ADMELOG) corrective regimen sliding scale   SubCutaneous at bedtime  insulin lispro (ADMELOG) corrective regimen sliding scale   SubCutaneous three times a day before meals  levothyroxine 25 MICROGram(s) Oral daily  metoclopramide 10 milliGRAM(s) Oral three times a day  metolazone 10 milliGRAM(s) Oral daily  metoprolol succinate ER 25 milliGRAM(s) Oral daily  pantoprazole    Tablet 40 milliGRAM(s) Oral before breakfast  sevelamer carbonate 800 milliGRAM(s) Oral three times a day with meals  simvastatin 40 milliGRAM(s) Oral at bedtime  sucralfate suspension 1 Gram(s) Oral four times a day      LABS:	 	    CARDIAC MARKERS:  CARDIAC MARKERS ( 18 Mar 2023 18:20 )  x     / x     / x     / x     / 6.1 ng/mL        Troponin I, High Sensitivity Result: 118.5 ng/L (03-19-23 @ 06:40)  Troponin I, High Sensitivity Result: 176.7 ng/L (03-18-23 @ 18:20)                              8.6    4.35  )-----------( 89       ( 19 Mar 2023 06:40 )             27.5     Hemoglobin: 8.6 g/dL (03-19 @ 06:40)  Hemoglobin: 9.4 g/dL (03-18 @ 18:20)  Hemoglobin: 8.5 g/dL (03-18 @ 07:30)  Hemoglobin: 9.3 g/dL (03-17 @ 08:15)  Hemoglobin: 8.4 g/dL (03-16 @ 10:30)      03-19    134<L>  |  98  |  52<H>  ----------------------------<  328<H>  5.6<H>   |  28  |  7.87<H>    Ca    8.5      19 Mar 2023 06:40  Phos  1.7     03-19  Mg     2.6     03-19    TPro  7.0  /  Alb  3.2<L>  /  TBili  0.5  /  DBili  x   /  AST  26  /  ALT  16  /  AlkPhos  77  03-18    Creatinine Trend: 7.87<--, 6.65<--, 10.40<--, 8.21<--, 11.00<--, 8.05<--        PHYSICAL EXAM:  T(C): 37.1 (03-20-23 @ 05:37), Max: 37.2 (03-19-23 @ 21:05)  HR: 90 (03-20-23 @ 05:37) (88 - 94)  BP: 136/73 (03-20-23 @ 05:37) (136/73 - 153/81)  RR: 17 (03-20-23 @ 05:37) (17 - 18)  SpO2: 100% (03-20-23 @ 05:37) (94% - 100%)  Wt(kg): --  I&O's Summary      HEENT:  (-)icterus (-)pallor  CV: N S1 S2 1/6 DIANA (+)2 Pulses B/l  Resp:  Clear to ausculatation B/L, normal effort  GI: (+) BS Soft, NT, ND  Lymph:  (-)Edema, (-)obvious lymphadenopathy  Skin: Warm to touch, Normal turgor  Psych: Appropriate mood and affect      TELEMETRY: 	  Sinus        ASSESSMENT/PLAN: 	62y  Male  PMH of ESRD on dialysis (Tues, Thurs, Sat), DM2, CAD s/p MI, normal LV and RV function no ischemia on stress 4/22 , glaucoma, legally blind, HTN, orthostatic hypotension, hyperparathyroid, hyperkalemia, LS spinal stenosis, osteoarthritis, osteoporosis, PAD, hx of osteomyelitis of thoracic vertebrae, remote hx of crack-cocaine use disorder, PSH of left BKA presents after being found hypoxic at the nursing home.    # NSTEMI  - Mild elevation in the setting of hypoxia and ESRD likely multifactorial.  Hypoxia, elevated cardiac filling pressures.  He has no Chest pain and no injury or ischemia on EKG presentation is inconsistent with ACS  - echo 2/24 EF 45%  - Stress test less than 1 year ago revealed normal perfusion     # SOB  - Oupt of proportion to xray findings would consider pulmonary eval as i  suspect a primary pulmonary process  - no clinical CHF  - F/U VQ scan  - pulm f/u    # ESRD   - HD per renal      Dominic Still MD, [facility]  BEEPER [phone]

## 2023-03-20 NOTE — PROGRESS NOTE ADULT - SUBJECTIVE AND OBJECTIVE BOX
[facility] : Progress Note :: [phone], (office [phone]),   Dr Mosher / Dr Washington / Dr Young / Dr Doll / Dr Varsha TURCIOS / Dr Tello / Dr Garcia / Dr Larry qiu  _____________________________________________________________________________________________    awaits oxygen delivery at the assisted facility.    fish (Rash)  liver (Anaphylaxis)  No Known Drug Allergies    Hospital Medications:   MEDICATIONS  (STANDING):  albuterol/ipratropium for Nebulization 3 milliLiter(s) Nebulizer every 6 hours  ALPRAZolam 0.25 milliGRAM(s) Oral at bedtime  amLODIPine   Tablet 10 milliGRAM(s) Oral daily  budesonide 160 MICROgram(s)/formoterol 4.5 MICROgram(s) Inhaler 2 Puff(s) Inhalation two times a day  calcium carbonate    500 mG (Tums) Chewable 1 Tablet(s) Chew two times a day  chlorhexidine 2% Cloths 1 Application(s) Topical daily  epoetin beverley-epbx (RETACRIT) Injectable 11156 Unit(s) IV Push <User Schedule>  ferrous    sulfate 325 milliGRAM(s) Oral daily  furosemide    Tablet 80 milliGRAM(s) Oral daily  heparin   Injectable 5000 Unit(s) SubCutaneous every 8 hours  hydrALAZINE 25 milliGRAM(s) Oral three times a day  insulin glargine Injectable (LANTUS) 4 Unit(s) SubCutaneous at bedtime  insulin lispro (ADMELOG) corrective regimen sliding scale   SubCutaneous three times a day before meals  insulin lispro (ADMELOG) corrective regimen sliding scale   SubCutaneous at bedtime  levothyroxine 25 MICROGram(s) Oral daily  metoclopramide 10 milliGRAM(s) Oral three times a day  metolazone 10 milliGRAM(s) Oral daily  metoprolol succinate ER 25 milliGRAM(s) Oral daily  pantoprazole    Tablet 40 milliGRAM(s) Oral two times a day  sevelamer carbonate 800 milliGRAM(s) Oral three times a day with meals  simvastatin 40 milliGRAM(s) Oral at bedtime  sucralfate 1 Gram(s) Oral four times a day        VITALS:  T(F): 98.8 (03-20-23 @ 05:37), Max: 98.9 (03-19-23 @ 21:05)  HR: 80 (03-20-23 @ 17:39)  BP: 147/86 (03-20-23 @ 17:39)  RR: 18 (03-20-23 @ 17:39)  SpO2: 97% (03-20-23 @ 17:39)  Wt(kg): --      PHYSICAL EXAM:  Constitutional: NAD  HEENT: anicteric sclera, oropharynx clear.  Neck: No JVD  Respiratory: CTAB, no wheezes, rales or rhonchi  Cardiovascular: S1, S2, RRR  Gastrointestinal: BS+, soft, NT/ND  Extremities:  No peripheral edema  Neurological: A/O x 3, no focal deficits.  : No CVA tenderness. No [place].   Skin: No rashes  Vascular Access: AVF with thrill and bruit    LABS:  03-19    134<L>  |  98  |  52<H>  ----------------------------<  328<H>  5.6<H>   |  28  |  7.87<H>    Ca    8.5      19 Mar 2023 06:40  Phos  1.7     03-19  Mg     2.6     03-19      Creatinine Trend: 7.87 <--, 6.65 <--, 10.40 <--, 8.21 <--, 11.00 <--, 8.05 <--                        8.6    4.35  )-----------( 89       ( 19 Mar 2023 06:40 )             27.5     Urine Studies:      RADIOLOGY & ADDITIONAL STUDIES:

## 2023-03-20 NOTE — ED ADULT NURSE NOTE - CHIEF COMPLAINT
Additional Notes: Patient consent was obtained to proceed with the visit and recommended plan of care after discussion of all risks and benefits, including the risks of COVID-19 exposure. Detail Level: Simple The patient is a 62y Male complaining of abnormal lab result.

## 2023-03-20 NOTE — PROGRESS NOTE ADULT - SUBJECTIVE AND OBJECTIVE BOX
PGY-1 Progress Note discussed with attending    PAGER #: [[phone]] TILL 5:00 PM  PLEASE CONTACT ON CALL TEAM:   - On Call Team (Please refer to Dejon) FROM 5:00 PM - 8:30PM  - Nightfloat Team FROM 8:30 -7:30 AM    INTERVAL HPI/OVERNIGHT EVENTS:       REVIEW OF SYSTEMS:  CONSTITUTIONAL: No fever, weight loss, or fatigue  RESPIRATORY: No cough, wheezing, chills or hemoptysis; No shortness of breath  CARDIOVASCULAR: No chest pain, palpitations, dizziness, or leg swelling  GASTROINTESTINAL: No abdominal pain. No nausea, vomiting, or hematemesis; No diarrhea or constipation. No melena or hematochezia.  GENITOURINARY: No dysuria or hematuria, urinary frequency  NEUROLOGICAL: No headaches, memory loss, loss of strength, numbness, or tremors  SKIN: No itching, burning, rashes, or lesions     MEDICATIONS  (STANDING):  albuterol/ipratropium for Nebulization 3 milliLiter(s) Nebulizer every 6 hours  ALPRAZolam 0.25 milliGRAM(s) Oral at bedtime  amLODIPine   Tablet 10 milliGRAM(s) Oral daily  budesonide 160 MICROgram(s)/formoterol 4.5 MICROgram(s) Inhaler 2 Puff(s) Inhalation two times a day  calcium carbonate    500 mG (Tums) Chewable 1 Tablet(s) Chew two times a day  chlorhexidine 2% Cloths 1 Application(s) Topical daily  epoetin beverley-epbx (RETACRIT) Injectable 73611 Unit(s) IV Push <User Schedule>  ferrous    sulfate 325 milliGRAM(s) Oral daily  furosemide    Tablet 80 milliGRAM(s) Oral daily  heparin   Injectable 5000 Unit(s) SubCutaneous every 8 hours  hydrALAZINE 25 milliGRAM(s) Oral three times a day  insulin glargine Injectable (LANTUS) 4 Unit(s) SubCutaneous at bedtime  insulin lispro (ADMELOG) corrective regimen sliding scale   SubCutaneous three times a day before meals  insulin lispro (ADMELOG) corrective regimen sliding scale   SubCutaneous at bedtime  levothyroxine 25 MICROGram(s) Oral daily  metoclopramide 10 milliGRAM(s) Oral three times a day  metolazone 10 milliGRAM(s) Oral daily  metoprolol succinate ER 25 milliGRAM(s) Oral daily  pantoprazole    Tablet 40 milliGRAM(s) Oral before breakfast  sevelamer carbonate 800 milliGRAM(s) Oral three times a day with meals  simvastatin 40 milliGRAM(s) Oral at bedtime  sucralfate suspension 1 Gram(s) Oral four times a day    MEDICATIONS  (PRN):  acetaminophen     Tablet .. 650 milliGRAM(s) Oral every 6 hours PRN Temp greater or equal to 38C (100.4F), Mild Pain (1 - 3)  oxyCODONE    IR 5 milliGRAM(s) Oral every 8 hours PRN Severe Pain (7 - 10)  zolpidem 5 milliGRAM(s) Oral at bedtime PRN Insomnia      Vital Signs Last 24 Hrs  T(C): 37.1 (20 Mar 2023 05:37), Max: 37.2 (19 Mar 2023 21:05)  T(F): 98.8 (20 Mar 2023 05:37), Max: 98.9 (19 Mar 2023 21:05)  HR: 90 (20 Mar 2023 05:37) (88 - 94)  BP: 136/73 (20 Mar 2023 05:37) (136/73 - 153/81)  BP(mean): --  RR: 17 (20 Mar 2023 05:37) (17 - 18)  SpO2: 100% (20 Mar 2023 05:37) (94% - 100%)    Parameters below as of 20 Mar 2023 05:37  Patient On (Oxygen Delivery Method): mask, nonrebreather  O2 Flow (L/min): 15      PHYSICAL EXAMINATION:  GENERAL: NAD, well built  HEAD:  Atraumatic, Normocephalic  EYES:  conjunctiva and sclera clear  NECK: Supple, No JVD, Normal thyroid  CHEST/LUNG: Clear to auscultation. Clear to percussion bilaterally; No rales, rhonchi, wheezing, or rubs  HEART: Regular rate and rhythm; No murmurs, rubs, or gallops  ABDOMEN: Soft, Nontender, Nondistended; Bowel sounds present  NERVOUS SYSTEM:  Alert & Oriented X3,    EXTREMITIES:  2+ Peripheral Pulses, No clubbing, cyanosis, or edema  SKIN: warm dry                          8.6    4.35  )-----------( 89       ( 19 Mar 2023 06:40 )             27.5     03-19    134<L>  |  98  |  52<H>  ----------------------------<  328<H>  5.6<H>   |  28  |  7.87<H>    Ca    8.5      19 Mar 2023 06:40  Phos  1.7     03-19  Mg     2.6     03-19    TPro  7.0  /  Alb  3.2<L>  /  TBili  0.5  /  DBili  x   /  AST  26  /  ALT  16  /  AlkPhos  77  03-18    LIVER FUNCTIONS - ( 18 Mar 2023 18:20 )  Alb: 3.2 g/dL / Pro: 7.0 g/dL / ALK PHOS: 77 U/L / ALT: 16 U/L DA / AST: 26 U/L / GGT: x           CARDIAC MARKERS ( 18 Mar 2023 18:20 )  x     / x     / x     / x     / 6.1 ng/mL              I&O's Summary      CAPILLARY BLOOD GLUCOSE      POCT Blood Glucose.: 161 mg/dL (20 Mar 2023 07:53)    CAPILLARY BLOOD GLUCOSE      POCT Blood Glucose.: 161 mg/dL (20 Mar 2023 07:53)  POCT Blood Glucose.: 117 mg/dL (19 Mar 2023 21:55)  POCT Blood Glucose.: 252 mg/dL (19 Mar 2023 16:56)  POCT Blood Glucose.: 198 mg/dL (19 Mar 2023 11:40)      RADIOLOGY & ADDITIONAL TESTS:                   PGY-1 Progress Note discussed with attending    PAGER #: [[phone]] TILL 5:00 PM  PLEASE CONTACT ON CALL TEAM:   - On Call Team (Please refer to Dejon) FROM 5:00 PM - 8:30PM  - Nightfloat Team FROM 8:30 -7:30 AM    INTERVAL HPI/OVERNIGHT EVENTS:   No acute overnight events. Pt went for VQ scan however was not able to tolerate the breathing instructions and could not complete the test. Later, pt had an episode of wheezing and shortness of breath. Improved after duoneb treatment. He denies any acute complaints today.     REVIEW OF SYSTEMS:  CONSTITUTIONAL: No fever, weight loss, or fatigue  RESPIRATORY: No cough, wheezing, chills or hemoptysis; No shortness of breath  CARDIOVASCULAR: No chest pain, palpitations, dizziness, or leg swelling  GASTROINTESTINAL: No abdominal pain. No nausea, vomiting, or hematemesis; No diarrhea or constipation. No melena or hematochezia.  GENITOURINARY: No dysuria or hematuria, urinary frequency  NEUROLOGICAL: No headaches, memory loss, loss of strength, numbness, or tremors  SKIN: No itching, burning, rashes, or lesions     MEDICATIONS  (STANDING):  albuterol/ipratropium for Nebulization 3 milliLiter(s) Nebulizer every 6 hours  ALPRAZolam 0.25 milliGRAM(s) Oral at bedtime  amLODIPine   Tablet 10 milliGRAM(s) Oral daily  budesonide 160 MICROgram(s)/formoterol 4.5 MICROgram(s) Inhaler 2 Puff(s) Inhalation two times a day  calcium carbonate    500 mG (Tums) Chewable 1 Tablet(s) Chew two times a day  chlorhexidine 2% Cloths 1 Application(s) Topical daily  epoetin beverley-epbx (RETACRIT) Injectable 16438 Unit(s) IV Push <User Schedule>  ferrous    sulfate 325 milliGRAM(s) Oral daily  furosemide    Tablet 80 milliGRAM(s) Oral daily  heparin   Injectable 5000 Unit(s) SubCutaneous every 8 hours  hydrALAZINE 25 milliGRAM(s) Oral three times a day  insulin glargine Injectable (LANTUS) 4 Unit(s) SubCutaneous at bedtime  insulin lispro (ADMELOG) corrective regimen sliding scale   SubCutaneous three times a day before meals  insulin lispro (ADMELOG) corrective regimen sliding scale   SubCutaneous at bedtime  levothyroxine 25 MICROGram(s) Oral daily  metoclopramide 10 milliGRAM(s) Oral three times a day  metolazone 10 milliGRAM(s) Oral daily  metoprolol succinate ER 25 milliGRAM(s) Oral daily  pantoprazole    Tablet 40 milliGRAM(s) Oral before breakfast  sevelamer carbonate 800 milliGRAM(s) Oral three times a day with meals  simvastatin 40 milliGRAM(s) Oral at bedtime  sucralfate suspension 1 Gram(s) Oral four times a day    MEDICATIONS  (PRN):  acetaminophen     Tablet .. 650 milliGRAM(s) Oral every 6 hours PRN Temp greater or equal to 38C (100.4F), Mild Pain (1 - 3)  oxyCODONE    IR 5 milliGRAM(s) Oral every 8 hours PRN Severe Pain (7 - 10)  zolpidem 5 milliGRAM(s) Oral at bedtime PRN Insomnia      Vital Signs Last 24 Hrs  T(C): 37.1 (20 Mar 2023 05:37), Max: 37.2 (19 Mar 2023 21:05)  T(F): 98.8 (20 Mar 2023 05:37), Max: 98.9 (19 Mar 2023 21:05)  HR: 90 (20 Mar 2023 05:37) (88 - 94)  BP: 136/73 (20 Mar 2023 05:37) (136/73 - 153/81)  BP(mean): --  RR: 17 (20 Mar 2023 05:37) (17 - 18)  SpO2: 100% (20 Mar 2023 05:37) (94% - 100%)    Parameters below as of 20 Mar 2023 05:37  Patient On (Oxygen Delivery Method): mask, nonrebreather  O2 Flow (L/min): 15      PHYSICAL EXAMINATION:  GENERAL: NAD, legally blind male  HEAD:  Atraumatic, Normocephalic  EYES:  conjunctiva and sclera clear  NECK: Supple, No JVD, Normal thyroid  CHEST/LUNG: + diffuse wheezing, mild bibasilar crackles. No rales, rhonchi, or rubs  HEART: Regular rate and rhythm; No murmurs, rubs, or gallops  ABDOMEN: Soft, Nontender, Nondistended; Bowel sounds present  NERVOUS SYSTEM:  Alert & Oriented X3  EXTREMITIES:  2+ Peripheral Pulses, No clubbing, cyanosis, or edema. + LLE BKA, +RUE AV fistula  SKIN: warm dry                        8.6    4.35  )-----------( 89       ( 19 Mar 2023 06:40 )             27.5     03-19    134<L>  |  98  |  52<H>  ----------------------------<  328<H>  5.6<H>   |  28  |  7.87<H>    Ca    8.5      19 Mar 2023 06:40  Phos  1.7     03-19  Mg     2.6     03-19    TPro  7.0  /  Alb  3.2<L>  /  TBili  0.5  /  DBili  x   /  AST  26  /  ALT  16  /  AlkPhos  77  03-18    LIVER FUNCTIONS - ( 18 Mar 2023 18:20 )  Alb: 3.2 g/dL / Pro: 7.0 g/dL / ALK PHOS: 77 U/L / ALT: 16 U/L DA / AST: 26 U/L / GGT: x           CARDIAC MARKERS ( 18 Mar 2023 18:20 )  x     / x     / x     / x     / 6.1 ng/mL              I&O's Summary      CAPILLARY BLOOD GLUCOSE      POCT Blood Glucose.: 161 mg/dL (20 Mar 2023 07:53)    CAPILLARY BLOOD GLUCOSE      POCT Blood Glucose.: 161 mg/dL (20 Mar 2023 07:53)  POCT Blood Glucose.: 117 mg/dL (19 Mar 2023 21:55)  POCT Blood Glucose.: 252 mg/dL (19 Mar 2023 16:56)  POCT Blood Glucose.: 198 mg/dL (19 Mar 2023 11:40)

## 2023-03-20 NOTE — PROGRESS NOTE ADULT - ATTENDING COMMENTS
# RAPID RESPONSE S/T EPISODE OF DYSPNEA [3/18]  - RESOLVED WITH DUONEB ADMINISTRATION  - NOTED EKG, F/U CXR  - F/U TROPONINS  - EVALUATED BY RAPID RESPONSE TEAM  - [3/20] - EPISODE OF WHEEZING IMPROVED BY DUONEB    # ACUTE ON CHRONIC HYPOXIC RESPIRATORY FAILURE S/T ? PULMONARY EDEMA + HX OF COPD  # UNCONTROLLED HTN  # ESRD ON HD TTS - W/ HX OF NONCOMPLIANCE    - HYDRALAZINE, METOPROLOL AND NORVASC  - PLACED ON DECADRON AND LEVAQUIN RECENTLY  - SUPPLEMENTAL O2  - NEPHROLOGY CONSULT  - PULMONOLOGY CONSULT  - CARDIOLOGY CONSULT    - PATIENT WILL NEED HOME O2, CM TEAM TO HELP COORDINATION; IS 87% ON ROOM AIR AT REST    - RECOMMENDED CTANGIO CHEST BY PULMONOLOGY TEAM - PATIENT DID NOT TOLERATE. PATIENT VERBALIZED UNDERSTANDING OF RISKS OF DEFERING THIS TEST WHICH INCLUDE BUT ARE NOT LIMITED TO MISSED DIAGNOSIS, WORSENING CLINICAL CONDITION AND DEATH.    - PATIENT STARTED V/Q SCAN AND COULD NOT TOLERATE / FOLLOW THE RECOMMENDED BREATHING INSTRUCTIONS. HE REFUSED TEST.      # ELEVATED TROPONINS - IN THE SETTING OF RENAL FAILURE, ? DEMAND ISCHEMIA  - TELEMETRY  - TRENDED TROPONINS  -  ECHO 2/24/23 - TRACE AR, CONCENTRIC LVH, G1DD  - CARDIOLOGY CONSULT    # EPISODE OF SMALL VOLUME EMESIS W/ STREAKING OF HEMATEMESIS  # RECURRENT INTRACTABLE NAUSEA/VOMITING  # HISTORY OF ESOPHAGITIS AND DUODENITIS [1/2021], HX OF GASTROPARESIS W/ EVIDENCE OF THICKENED ESOPHAGUS ON PREVIOUS CT SCAN   - EGD AT [facility] - DEMONSTRATED RETAINED FOOD PRODUCT IN STOMACH, RECOMMENDED FOR GASTRIC EMPTYING STUDY  - DENIES RECENT HEMATEMESIS   - MONITORING HGB, PLACED ON PPI BID  - CARAFATE, PRN ANTIEMETICS  - MONITORING FOR SYMPTOMS  - TOLERATING DIET ; PATIENT REPEATEDLY COUNSELLED TO CHEW FOOD CAUTIOUSLY AND CONSUME SMALL BITES W/ REGULAR CLEARING WITH WATER BETWEEN BITES.      - PATIENT RECENTLY STARTED EVALUATION TO R/O PANCREATIC HEAD MASS, UNDERWENT MRCP, PLANNED FOR OUTPATIENT F/U    - GI CONSULT    # HYPOGLYCEMIC EPISODE, LABILE BLOOD GLUCOSE  # SUSPECTED GASTROPARESIS  # UNDERLYING DM  - SSI + FS  - ENDOCRINOLOGY CONSULT    # ? HEMOCHROMATOSIS  - NOTED FERRITIN, F/U IRON AND TIBC, TRANSFERRIN SAT  - WILL REFER HEPATOLOGY AS OUTPATIENT PENDING ABOVE WORKUP    # PATIENT UNDERGOING OUTPATIENT WORKUP FOR CENTRAL VENOUS STENOSIS THROUGH NEPHROLOGY TEAM    # ANEMIA OF CKD  # HX OF PANCYTOPENIA   - TREND HGB, TRANSFUSION THRESHOLD HGB < 7  - TYPE AND SCREEN  - ON EPO  - DENIES RECENT HEMATEMESIS, MELENA, HEMATOCHEZIA  - HEME/ONC CONSULT    # SEVERE PROTEIN CALORIE MALNUTRITION, FAILURE TO THRIVE - NUTRITIONAL SUPPLEMENT    # HLD  # PARTIALLY BLIND  # S/P LEFT BKA  # HX OF PVD  # LS SPINAL STENOSIS  # GI AND DVT PPX.

## 2023-03-20 NOTE — PROGRESS NOTE ADULT - ASSESSMENT
Patient is a 62y Male whom presented to the hospital with hypoxia.  He has ESRD on HD TTS at [facility], last dialysed yesterday with UF of 1.6 kilos. Presented to ED yesterday with vomitting and generalized weakness.  upon discharge, was noted to be hypoxic sats 88% on RA, thus re-sent to ED. sats was 99% on oxygen. a CXR did not reveal any pulmonary edema.  at time of exam, he denies any SOB and is talking in complete sentences off oxygen. had an episode of vomitting in ED.  renal consulted for ESRD.    # ESRD.  s/p HD Saturday- did not finish HD treatment. euvolemic  got   Lokelma.  cont lasix.  HD in AM.  # hypoxia. per pulmo recs- awaits oxygen delivery.  # anemia of CKD. epogen on HD   # CKDMBD  PHOS ELEVATED.  SEVELAMER   D/C planning

## 2023-03-21 NOTE — CONSULT NOTE ADULT - ASSESSMENT
Patient is a 62M with a PMHx of ESRD (HD TTHS), T2DM, CAD s/p MI, CHF, glaucoma (legally blind), HTN, orthostatic hypotension, hyperparathyroid, hyperkalemia, LS spinal stenosis, osteoarthritis, osteoporosis, PAD, esophagitis, hx of osteomyelitis of thoracic vertebrae, remote hx of crack-cocaine use disorder, BKA of L leg, who presented to the ED from nursing home due to hypoxia. GI was consulted for hematemesis.    Patient was noted to have hypoxic to 87% on RA, admitted for AHRF 2/2 COPD exacerbation and chronic pleural effusions 2/2 advanced renal failure. Last HD was 3/19. In the ED, Hgb 8.9 -> 9.3 -> 8.6 (3/19).     Of note, GI evaluated patient on 2/22/23 for double duct sign on CTAP, recommended MRCP to evaluate need for EUS.  MRCP (2/23): main PD dilated 7mm, no gross pancreatic mass, diffuse signal abnormality of liver and spleen ? hemachromatosis, check ferritin, consider EUS to r/o obstructive Pancreatic head mass or ampullary tumor, no gallstones or GB wall thickening, small cystic lesion in GB fundal wall likely adenomyomatosis, dilated stomach, trace ascites.   Per chart review, known to GI service and has denied endoscopic evaluations in the past. Endorses remote drug use "back in the days, sniff/smoke crack-cocaine, marijuana", current smoker 3 cigarettes a day x 4-5 years. No ETOH use.   He denies family hx of liver disease or colorectal cancers. Denies taking herbal supplements or medication changes.  Last EGD (8/3/22) - large amt of food in gastric body, aborted.   [place]ar (8/3/22): poor bowel prep, aborted.     Patient has been refusing lab draws. Patient reported hematemesis on 3/20 however no CBC drawn to evaluate anemia trend. Otherwise no overt signs of bleeding. He denies nausea, abd pain/tenderness, dyspepsia, dysphagia, odynophagia.      #Hx of esophagitis  #Hematemesis  #DELROY  #Double duct sign  Patient with hx of double duct sign on previous admission, GI was asked to evaluate for ? EUS, recommended MRCP however patient unable to tolerate IV contrast. This admission, he had one episode of hematemesis, unable to trend CBC due to patient refusal of lab draws. No overt signs of bleeding and hematemesis self-resolved.  Labs reviewed 3/19, no leukocytosis, Hgb stable 8.6, plt 89. Per chart review, Baseline Hgb between 8.5-9.8.   EGD (01/2021): LA grade D esophagitis, biopsies unremarkable  EGD (8/3/22) for DELROY, dysphagia: esophagus normal, food in gastric body, aborted.  Keymar (8/3/22): poor bowel prep.      	- PRN IV anti-emetics  	- Carafate slurry 1g three times daily for esophagitis   	- IV/PO Protonix 40mg BID  	- Outpatient GI follow up for ? EUS/endoscopic evaluation, patient is asymptomatic at this time and otherwise stable    This note and its recommendations herein are preliminary until such time as cosigned by an attending.    Thank you for this consult!   Patient is a 62M with a PMHx of ESRD (HD TTHS), T2DM, CAD s/p MI, CHF, glaucoma (legally blind), HTN, orthostatic hypotension, hyperparathyroid, hyperkalemia, LS spinal stenosis, osteoarthritis, osteoporosis, PAD, esophagitis, hx of osteomyelitis of thoracic vertebrae, remote hx of crack-cocaine use disorder, BKA of L leg, who presented to the ED from nursing home due to hypoxia. GI was consulted for hematemesis.    Patient was noted to have hypoxic to 87% on RA, admitted for AHRF 2/2 COPD exacerbation and chronic pleural effusions 2/2 advanced renal failure. Last HD was 3/19. In the ED, Hgb 8.9 -> 9.3 -> 8.6 (3/19).     Of note, GI evaluated patient on 2/22/23 for double duct sign on CTAP, recommended MRCP to evaluate need for EUS.  MRCP (2/23): main PD dilated 7mm, no gross pancreatic mass, diffuse signal abnormality of liver and spleen ? hemachromatosis, check ferritin, consider EUS to r/o obstructive Pancreatic head mass or ampullary tumor, no gallstones or GB wall thickening, small cystic lesion in GB fundal wall likely adenomyomatosis, dilated stomach, trace ascites.   Per chart review, known to GI service and has denied endoscopic evaluations in the past. Endorses remote drug use "back in the days, sniff/smoke crack-cocaine, marijuana", current smoker 3 cigarettes a day x 4-5 years. No ETOH use.   He denies family hx of liver disease or colorectal cancers. Denies taking herbal supplements or medication changes.  Last EGD (8/3/22) - large amt of food in gastric body, aborted.   [place] (8/3/22): poor bowel prep, aborted.     Patient has been refusing lab draws. Patient reported hematemesis on 3/20 however no CBC drawn to evaluate anemia trend. Otherwise no overt signs of bleeding. He denies nausea, abd pain/tenderness, dyspepsia, dysphagia, odynophagia.      #Hx of esophagitis  #Hematemesis  #DELORY  #Double duct sign  Patient with hx of double duct sign on previous admission, GI was asked to evaluate for ? EUS, recommended MRCP however patient unable to tolerate IV contrast. This admission, he had one episode of hematemesis, unable to trend CBC due to patient refusal of lab draws. No overt signs of bleeding and hematemesis self-resolved.  Labs reviewed 3/19, no leukocytosis, Hgb stable 8.6, plt 89. Per chart review, Baseline Hgb between 8.5-9.8.   EGD (01/2021): LA grade D esophagitis, biopsies unremarkable  EGD (8/3/22) for DELROY, dysphagia: esophagus normal, food in gastric body, aborted.  [place] (8/3/22): poor bowel prep.      	- PRN IV anti-emetics  	- Carafate slurry 1g three times daily for esophagitis   	- IV/PO Protonix 40mg BID  	- Outpatient GI follow up    This note and its recommendations herein are preliminary until such time as cosigned by an attending.    Thank you for this consult!

## 2023-03-21 NOTE — DISCHARGE NOTE PROVIDER - NSDCFUSCHEDAPPT_GEN_ALL_CORE_FT
[facility]  GASTRO OP [id number]  Scheduled Appointment: 03/23/2023    [facility]  VASCULAR [address]  Scheduled Appointment: 04/17/2023    Ambrocio Mason  [facility]  VASCULAR [address]  Scheduled Appointment: 04/17/2023     [facility]  VASCULAR [address]  Scheduled Appointment: 04/17/2023    Ambrocio Mason  [facility]  VASCULAR [address]  Scheduled Appointment: 04/17/2023

## 2023-03-21 NOTE — PROGRESS NOTE ADULT - PROBLEM SELECTOR PLAN 3
ESRD on HD TTS at [facility], last dialysed yesterday with UF of 1.6 kilos  s/p HD yesterday, next HD Tuesday  Dr. Mosher following ESRD on HD TTS at [facility], last dialysed yesterday with UF of 1.6 kilos  s/p HD today next HD Thursday  Dr. Mosher following

## 2023-03-21 NOTE — PROGRESS NOTE ADULT - ASSESSMENT
Patient is a 62y Male whom presented to the hospital with hypoxia.  He has ESRD on HD TTS at [facility], last dialysed yesterday with UF of 1.6 kilos. Presented to ED yesterday with vomitting and generalized weakness.  upon discharge, was noted to be hypoxic sats 88% on RA, thus re-sent to ED. sats was 99% on oxygen. a CXR did not reveal any pulmonary edema.  at time of exam, he denies any SOB and is talking in complete sentences off oxygen. had an episode of vomitting in ED.  renal consulted for ESRD.    # ESRD.  s/p HD today   # anemia of CKD. epogen on HD   # CKDMBD  PHOS ELEVATED.  SEVELAMER   D/C planning -for home O2.

## 2023-03-21 NOTE — DISCHARGE NOTE PROVIDER - NSDCCPCAREPLAN_GEN_ALL_CORE_FT
PRINCIPAL DISCHARGE DIAGNOSIS  Diagnosis: Acute respiratory failure with hypoxia  Assessment and Plan of Treatment: You presented to the hospital with shortness of breath and low oxygen levels at the assisted living facility.      SECONDARY DISCHARGE DIAGNOSES  Diagnosis: ESRD on dialysis  Assessment and Plan of Treatment: Continue your dialysis as instructed by your Nephrologist. Dialysis will be reinstated upon your discharge.  Continue dialysis on : ___ ___ and ___  Please continue to take your medications as prescribed. Please continue to attend your dialysis sessions regularly. Adhere to your low salt, low potassium, low phosphorus diet. Please follow up with your nephrologist within 1 week of your discharge.      Diagnosis: Diabetes  Assessment and Plan of Treatment: Continue with your blood sugar medication. HbA1C on admission was ____. This is a reflection of your blood sugar level over the last 3 months.  Please check your blood sugar levels twice daily - Morning/Afternoon, and Lunch/Bedtime the following day. Keep a record of your blood sugar readings  You must maintain a healthy diet that consists of low sugar and low fat. Your diet must consist of mostly vegetables. Please limit your intake of high sugar and high carbohydrate foods such as pasta, rice, and bread. Exercise frequently if possible. Follow up with primary care physician within one week of your discharge to further manage your diabetes and monitor your Hemoglobin A1c levels after 3 months to evaluate your diabetes control.      Diagnosis: Hypertension  Assessment and Plan of Treatment: You have high blood pressure, for which you have been started on medications. It is important to continue to take your medications on time,  monitor your blood pressure at home, keep a log of your home readings and follow up with your Primary Care Physician. As per AHA/ACC guidelines, it is important to adhere to a DASH Diet of fresh fruits, vegetables, lean meats such as poultry and fish, and whole wheat carbs. 30 minutes of aerobic exercise per day 3-4 times a week, reducing salt intake <1.5g/day, and cutting down on highly processed foods are also shown to reduce BP. Uncontrolled BP may result in organ damage to your eyes, brain, heart, and kidneys by causing strokes, heart attacks, kidney failure, and bleeds in your eye.      Diagnosis: Hypothyroid  Assessment and Plan of Treatment: You have a history of hypothyroidism for which you are on thyroid replacement at home. We recommend that you continue taking your medications and follow up with your PCP regularly to check your TSH levels. Please seek medical attention if you experience slowing of the heart rate, unintentional weight gain, cold intolerance, lethargy,or  daytime drowsiness.       PRINCIPAL DISCHARGE DIAGNOSIS  Diagnosis: Acute respiratory failure with hypoxia  Assessment and Plan of Treatment: You presented to the hospital with shortness of breath and low oxygen levels at the assisted living facility. We evaluated you and recommened home oxygen. The facility will set up the oxygen for you. Continue to take your inhalers as prescribed.      SECONDARY DISCHARGE DIAGNOSES  Diagnosis: ESRD on dialysis  Assessment and Plan of Treatment: Continue your dialysis as instructed by your Nephrologist. Dialysis will be reinstated upon your discharge.  Continue dialysis on : Tuesday, Thursday and Saturday.  Please continue to take your medications as prescribed. Please continue to attend your dialysis sessions regularly. Adhere to your low salt, low potassium, low phosphorus diet. Please follow up with your nephrologist within 1 week of your discharge.    Diagnosis: Diabetes  Assessment and Plan of Treatment: Continue with your blood sugar medication. HbA1C on admission was ____. This is a reflection of your blood sugar level over the last 3 months.  Please check your blood sugar levels twice daily - Morning/Afternoon, and Lunch/Bedtime the following day. Keep a record of your blood sugar readings  You must maintain a healthy diet that consists of low sugar and low fat. Your diet must consist of mostly vegetables. Please limit your intake of high sugar and high carbohydrate foods such as pasta, rice, and bread. Exercise frequently if possible. Follow up with primary care physician within one week of your discharge to further manage your diabetes and monitor your Hemoglobin A1c levels after 3 months to evaluate your diabetes control.      Diagnosis: Hypertension  Assessment and Plan of Treatment: You have high blood pressure, for which you take medications. It is important to continue to take your medications on time,  monitor your blood pressure at home, keep a log of your home readings and follow up with your Primary Care Physician. As per AHA/ACC guidelines, it is important to adhere to a DASH Diet of fresh fruits, vegetables, lean meats such as poultry and fish, and whole wheat carbs. 30 minutes of aerobic exercise per day 3-4 times a week, reducing salt intake <1.5g/day, and cutting down on highly processed foods are also shown to reduce BP. Uncontrolled BP may result in organ damage to your eyes, brain, heart, and kidneys by causing strokes, heart attacks, kidney failure, and bleeds in your eye.    Diagnosis: Hypothyroid  Assessment and Plan of Treatment: You have a history of hypothyroidism for which you are on thyroid replacement at home. We recommend that you continue taking your medications and follow up with your PCP regularly to check your TSH levels. Please seek medical attention if you experience slowing of the heart rate, unintentional weight gain, cold intolerance, lethargy,or  daytime drowsiness.       PRINCIPAL DISCHARGE DIAGNOSIS  Diagnosis: Acute respiratory failure with hypoxia  Assessment and Plan of Treatment: You presented to the hospital with shortness of breath and low oxygen levels at the assisted living facility. This most likely happened due to COPD exacerbation vs. chronic pleural effusions secondary to advanced renal failure. Your chest X-Ray was clear. Your EKG showed no new changes. We attempted to get a CT Angio of your lung but you were unable to tolerate the test due to burning sensation from the contrast. We attempted to get a V/Q Scan but you were unable to follow the breathing instruction and therefore could not complete the test. We continued to give you nebulizer treatment and you improved. We evaluated you and recommended home oxygen. The facility will set up the oxygen for you. Continue to take your inhalers as prescribed. If you feel increasingly short of breath that does not improve with inhalers, call your PCP or reach your nearest hospital.      SECONDARY DISCHARGE DIAGNOSES  Diagnosis: ESRD on dialysis  Assessment and Plan of Treatment: Continue your dialysis as instructed by your Nephrologist. Dialysis will be reinstated upon your discharge.  Continue dialysis on : Tuesday, Thursday and Saturday.  Please continue to take your medications as prescribed. Please continue to attend your dialysis sessions regularly. Adhere to your low salt, low potassium, low phosphorus diet. Please follow up with your nephrologist within 1 week of your discharge.    Diagnosis: Diabetes  Assessment and Plan of Treatment: Continue with your blood sugar medication. HbA1C on admission was 5.7. This is a reflection of your blood sugar level over the last 3 months.  Please check your blood sugar levels twice daily - Morning/Afternoon, and Lunch/Bedtime the following day. Keep a record of your blood sugar readings. You must maintain a healthy diet that consists of low sugar and low fat. Your diet must consist of mostly vegetables. Please limit your intake of high sugar and high carbohydrate foods such as pasta, rice, and bread. Exercise frequently if possible. Follow up with primary care physician within one week of your discharge to further manage your diabetes and monitor your Hemoglobin A1c levels after 3 months to evaluate your diabetes control.    Diagnosis: Hypertension  Assessment and Plan of Treatment: You have high blood pressure, for which you take medications. It is important to continue to take your medications on time, monitor your blood pressure at home, keep a log of your home readings and follow up with your Primary Care Physician. As per AHA/ACC guidelines, it is important to adhere to a DASH Diet of fresh fruits, vegetables, lean meats such as poultry and fish, and whole wheat carbs. 30 minutes of aerobic exercise per day 3-4 times a week, reducing salt intake <1.5g/day, and cutting down on highly processed foods are also shown to reduce BP. Uncontrolled BP may result in organ damage to your eyes, brain, heart, and kidneys by causing strokes, heart attacks, kidney failure, and bleeds in your eye.    Diagnosis: Hypothyroid  Assessment and Plan of Treatment: You have a history of hypothyroidism for which you are on thyroid replacement at home. We recommend that you continue taking your medications and follow up with your PCP regularly to check your TSH levels. Please seek medical attention if you experience slowing of the heart rate, unintentional weight gain, cold intolerance, lethargy,or  daytime drowsiness.      Diagnosis: Hematemesis  Assessment and Plan of Treatment: You have a history of hematemesis. You had an episode of small volume hematemesis vs. coughed up blood tinged mucus. GI, Dr. Lee evaluated you. You are reccommended to follow up with your GI doctor for outpatient endoscopy and colonoscopy.

## 2023-03-21 NOTE — PROGRESS NOTE ADULT - SUBJECTIVE AND OBJECTIVE BOX
PGY-1 Progress Note discussed with attending    PAGER #: [[phone]] TILL 5:00 PM  PLEASE CONTACT ON CALL TEAM:   - On Call Team (Please refer to Dejon) FROM 5:00 PM - 8:30PM  - Nightfloat Team FROM 8:30 -7:30 AM    INTERVAL HPI/OVERNIGHT EVENTS:       REVIEW OF SYSTEMS:  CONSTITUTIONAL: No fever, weight loss, or fatigue  RESPIRATORY: No cough, wheezing, chills or hemoptysis; No shortness of breath  CARDIOVASCULAR: No chest pain, palpitations, dizziness, or leg swelling  GASTROINTESTINAL: No abdominal pain. No nausea, vomiting, or hematemesis; No diarrhea or constipation. No melena or hematochezia.  GENITOURINARY: No dysuria or hematuria, urinary frequency  NEUROLOGICAL: No headaches, memory loss, loss of strength, numbness, or tremors  SKIN: No itching, burning, rashes, or lesions     MEDICATIONS  (STANDING):  albuterol/ipratropium for Nebulization 3 milliLiter(s) Nebulizer every 6 hours  ALPRAZolam 0.25 milliGRAM(s) Oral at bedtime  amLODIPine   Tablet 10 milliGRAM(s) Oral daily  budesonide 160 MICROgram(s)/formoterol 4.5 MICROgram(s) Inhaler 2 Puff(s) Inhalation two times a day  calcium carbonate    500 mG (Tums) Chewable 1 Tablet(s) Chew two times a day  chlorhexidine 2% Cloths 1 Application(s) Topical daily  epoetin beverley-epbx (RETACRIT) Injectable 52457 Unit(s) IV Push <User Schedule>  ferrous    sulfate 325 milliGRAM(s) Oral daily  furosemide    Tablet 80 milliGRAM(s) Oral daily  heparin   Injectable 5000 Unit(s) SubCutaneous every 8 hours  hydrALAZINE 25 milliGRAM(s) Oral three times a day  insulin glargine Injectable (LANTUS) 4 Unit(s) SubCutaneous at bedtime  insulin lispro (ADMELOG) corrective regimen sliding scale   SubCutaneous three times a day before meals  insulin lispro (ADMELOG) corrective regimen sliding scale   SubCutaneous at bedtime  levothyroxine 25 MICROGram(s) Oral daily  metoclopramide 10 milliGRAM(s) Oral three times a day  metolazone 10 milliGRAM(s) Oral daily  metoprolol succinate ER 25 milliGRAM(s) Oral daily  pantoprazole    Tablet 40 milliGRAM(s) Oral two times a day  sevelamer carbonate 800 milliGRAM(s) Oral three times a day with meals  simvastatin 40 milliGRAM(s) Oral at bedtime  sucralfate 1 Gram(s) Oral four times a day    MEDICATIONS  (PRN):  acetaminophen     Tablet .. 650 milliGRAM(s) Oral every 6 hours PRN Temp greater or equal to 38C (100.4F), Mild Pain (1 - 3)  oxyCODONE    IR 5 milliGRAM(s) Oral every 8 hours PRN Severe Pain (7 - 10)  zolpidem 5 milliGRAM(s) Oral at bedtime PRN Insomnia      Vital Signs Last 24 Hrs  T(C): 36.6 (21 Mar 2023 05:26), Max: 37.1 (20 Mar 2023 20:38)  T(F): 97.9 (21 Mar 2023 05:26), Max: 98.8 (20 Mar 2023 20:38)  HR: 90 (21 Mar 2023 05:26) (80 - 94)  BP: 140/75 (21 Mar 2023 05:26) (140/75 - 159/84)  BP(mean): --  RR: 18 (21 Mar 2023 05:26) (18 - 18)  SpO2: 100% (21 Mar 2023 05:26) (97% - 100%)    Parameters below as of 21 Mar 2023 05:26  Patient On (Oxygen Delivery Method): nasal cannula  O2 Flow (L/min): 3      PHYSICAL EXAMINATION:  GENERAL: NAD, well built  HEAD:  Atraumatic, Normocephalic  EYES:  conjunctiva and sclera clear  NECK: Supple, No JVD, Normal thyroid  CHEST/LUNG: Clear to auscultation. Clear to percussion bilaterally; No rales, rhonchi, wheezing, or rubs  HEART: Regular rate and rhythm; No murmurs, rubs, or gallops  ABDOMEN: Soft, Nontender, Nondistended; Bowel sounds present  NERVOUS SYSTEM:  Alert & Oriented X3,    EXTREMITIES:  2+ Peripheral Pulses, No clubbing, cyanosis, or edema  SKIN: warm dry                          I&O's Summary      CAPILLARY BLOOD GLUCOSE      POCT Blood Glucose.: 127 mg/dL (21 Mar 2023 07:42)    CAPILLARY BLOOD GLUCOSE      POCT Blood Glucose.: 127 mg/dL (21 Mar 2023 07:42)  POCT Blood Glucose.: 250 mg/dL (20 Mar 2023 21:18)  POCT Blood Glucose.: 177 mg/dL (20 Mar 2023 17:36)  POCT Blood Glucose.: 216 mg/dL (20 Mar 2023 11:51)      RADIOLOGY & ADDITIONAL TESTS:                   PGY-1 Progress Note discussed with attending    PAGER #: [[phone]] TILL 5:00 PM  PLEASE CONTACT ON CALL TEAM:   - On Call Team (Please refer to Dejon) FROM 5:00 PM - 8:30PM  - Nightfloat Team FROM 8:30 -7:30 AM    INTERVAL HPI/OVERNIGHT EVENTS:   Overnight, pt had dyspnea and wheezing, was given duonebs with improvement. This morning, pt had another episode of distress and wheezing which improved after duoneb. Pt is eager to go back to his facility.     REVIEW OF SYSTEMS:  CONSTITUTIONAL: No fever, weight loss, or fatigue  RESPIRATORY: No cough, + wheezing, No chills or hemoptysis; + shortness of breath  CARDIOVASCULAR: No chest pain, palpitations, dizziness, or leg swelling  GASTROINTESTINAL: No abdominal pain. No nausea, vomiting, or hematemesis; No diarrhea or constipation. No melena or hematochezia.  GENITOURINARY: No dysuria or hematuria, urinary frequency  NEUROLOGICAL: No headaches, memory loss, loss of strength, numbness, or tremors  SKIN: No itching, burning, rashes, or lesions     MEDICATIONS  (STANDING):  albuterol/ipratropium for Nebulization 3 milliLiter(s) Nebulizer every 6 hours  ALPRAZolam 0.25 milliGRAM(s) Oral at bedtime  amLODIPine   Tablet 10 milliGRAM(s) Oral daily  budesonide 160 MICROgram(s)/formoterol 4.5 MICROgram(s) Inhaler 2 Puff(s) Inhalation two times a day  calcium carbonate    500 mG (Tums) Chewable 1 Tablet(s) Chew two times a day  chlorhexidine 2% Cloths 1 Application(s) Topical daily  epoetin beverley-epbx (RETACRIT) Injectable 17643 Unit(s) IV Push <User Schedule>  ferrous    sulfate 325 milliGRAM(s) Oral daily  furosemide    Tablet 80 milliGRAM(s) Oral daily  heparin   Injectable 5000 Unit(s) SubCutaneous every 8 hours  hydrALAZINE 25 milliGRAM(s) Oral three times a day  insulin glargine Injectable (LANTUS) 4 Unit(s) SubCutaneous at bedtime  insulin lispro (ADMELOG) corrective regimen sliding scale   SubCutaneous three times a day before meals  insulin lispro (ADMELOG) corrective regimen sliding scale   SubCutaneous at bedtime  levothyroxine 25 MICROGram(s) Oral daily  metoclopramide 10 milliGRAM(s) Oral three times a day  metolazone 10 milliGRAM(s) Oral daily  metoprolol succinate ER 25 milliGRAM(s) Oral daily  pantoprazole    Tablet 40 milliGRAM(s) Oral two times a day  sevelamer carbonate 800 milliGRAM(s) Oral three times a day with meals  simvastatin 40 milliGRAM(s) Oral at bedtime  sucralfate 1 Gram(s) Oral four times a day    MEDICATIONS  (PRN):  acetaminophen     Tablet .. 650 milliGRAM(s) Oral every 6 hours PRN Temp greater or equal to 38C (100.4F), Mild Pain (1 - 3)  oxyCODONE    IR 5 milliGRAM(s) Oral every 8 hours PRN Severe Pain (7 - 10)  zolpidem 5 milliGRAM(s) Oral at bedtime PRN Insomnia      Vital Signs Last 24 Hrs  T(C): 36.6 (21 Mar 2023 05:26), Max: 37.1 (20 Mar 2023 20:38)  T(F): 97.9 (21 Mar 2023 05:26), Max: 98.8 (20 Mar 2023 20:38)  HR: 90 (21 Mar 2023 05:26) (80 - 94)  BP: 140/75 (21 Mar 2023 05:26) (140/75 - 159/84)  BP(mean): --  RR: 18 (21 Mar 2023 05:26) (18 - 18)  SpO2: 100% (21 Mar 2023 05:26) (97% - 100%)    Parameters below as of 21 Mar 2023 05:26  Patient On (Oxygen Delivery Method): nasal cannula  O2 Flow (L/min): 3      PHYSICAL EXAMINATION:  GENERAL: NAD, legally blind male  HEAD:  Atraumatic, Normocephalic  EYES:  conjunctiva and sclera clear  NECK: Supple, No JVD, Normal thyroid  CHEST/LUNG: + diffuse wheezing, improved after duoneb. mild bibasilar crackles. No rales, rhonchi, or rubs  HEART: Regular rate and rhythm; No murmurs, rubs, or gallops  ABDOMEN: Soft, Nontender, Nondistended; Bowel sounds present  NERVOUS SYSTEM:  Alert & Oriented X3  EXTREMITIES:  2+ Peripheral Pulses, No clubbing, cyanosis, or edema. + LLE BKA, +RUE AV fistula  SKIN: warm dry    CAPILLARY BLOOD GLUCOSE  POCT Blood Glucose.: 127 mg/dL (21 Mar 2023 07:42)    CAPILLARY BLOOD GLUCOSE      POCT Blood Glucose.: 127 mg/dL (21 Mar 2023 07:42)  POCT Blood Glucose.: 250 mg/dL (20 Mar 2023 21:18)  POCT Blood Glucose.: 177 mg/dL (20 Mar 2023 17:36)  POCT Blood Glucose.: 216 mg/dL (20 Mar 2023 11:51)

## 2023-03-21 NOTE — PROGRESS NOTE ADULT - SUBJECTIVE AND OBJECTIVE BOX
[facility] : Progress Note :: [phone], (office [phone]),   Dr Mosher / Dr Washington / Dr Young / Dr Doll / Dr Varsha TURCIOS / Dr Tello / Dr Garcia / Dr Larry qiu  _____________________________________________________________________________________________    seen earlier on HD     fish (Rash)  liver (Anaphylaxis)  No Known Drug Allergies    Hospital Medications:   MEDICATIONS  (STANDING):  albuterol/ipratropium for Nebulization 3 milliLiter(s) Nebulizer every 6 hours  ALPRAZolam 0.25 milliGRAM(s) Oral at bedtime  amLODIPine   Tablet 10 milliGRAM(s) Oral daily  budesonide 160 MICROgram(s)/formoterol 4.5 MICROgram(s) Inhaler 2 Puff(s) Inhalation two times a day  calcium carbonate    500 mG (Tums) Chewable 1 Tablet(s) Chew two times a day  chlorhexidine 2% Cloths 1 Application(s) Topical daily  epoetin beverley-epbx (RETACRIT) Injectable 38611 Unit(s) IV Push <User Schedule>  ferrous    sulfate 325 milliGRAM(s) Oral daily  furosemide    Tablet 80 milliGRAM(s) Oral daily  heparin   Injectable 5000 Unit(s) SubCutaneous every 8 hours  hydrALAZINE 25 milliGRAM(s) Oral three times a day  insulin glargine Injectable (LANTUS) 4 Unit(s) SubCutaneous at bedtime  insulin lispro (ADMELOG) corrective regimen sliding scale   SubCutaneous at bedtime  insulin lispro (ADMELOG) corrective regimen sliding scale   SubCutaneous three times a day before meals  levothyroxine 25 MICROGram(s) Oral daily  metoclopramide 10 milliGRAM(s) Oral three times a day  metolazone 10 milliGRAM(s) Oral daily  metoprolol succinate ER 25 milliGRAM(s) Oral daily  pantoprazole    Tablet 40 milliGRAM(s) Oral two times a day  simvastatin 40 milliGRAM(s) Oral at bedtime  sucralfate 1 Gram(s) Oral four times a day      VITALS:  T(F): 99.1 (03-21-23 @ 13:25), Max: 99.1 (03-21-23 @ 13:25)  HR: 103 (03-21-23 @ 13:25)  BP: 175/93 (03-21-23 @ 13:25)  RR: 17 (03-21-23 @ 13:25)  SpO2: 92% (03-21-23 @ 13:25)  Wt(kg): --      PHYSICAL EXAM:  Constitutional: NAD  HEENT: anicteric sclera, oropharynx clear,  Neck: No JVD  Respiratory: CTAB, no wheezes, rales or rhonchi  Cardiovascular: S1, S2, RRR  Gastrointestinal: BS+, soft, NT/ND  Extremities: No peripheral edema  Neurological: A/O x 3, no focal deficits  : No CVA tenderness. No [place].   Skin: No rashes  Vascular Access: AVF     LABS:  03-21    134<L>  |  99  |  87<H>  ----------------------------<  189<H>  6.1<H>   |  23  |  11.50<H>    Ca    8.1<L>      21 Mar 2023 10:45  Phos  1.7     03-21  Mg     2.7     03-21      Creatinine Trend: 11.50 <--, 7.87 <--, 6.65 <--, 10.40 <--, 8.21 <--, 11.00 <--, 8.05 <--                        8.3    4.69  )-----------( 108      ( 21 Mar 2023 10:45 )             27.0     Urine Studies:      RADIOLOGY & ADDITIONAL STUDIES:

## 2023-03-21 NOTE — DISCHARGE NOTE PROVIDER - HOSPITAL COURSE
62 year old, Male, w/ PMH of ESRD on dialysis (Tues, Thurs, Sat), DM2, CAD s/p MI, CHF, glaucoma, legally blind, HTN, orthostatic hypotension, hyperparathyroid, hyperkalemia, LS spinal stenosis, osteoarthritis, osteoporosis, PAD, hx of osteomyelitis of thoracic vertebrae, remote hx of crack-cocaine use disorder, PSH of BKA of L leg presents after being found hypoxic at the nursing home. Per chart review, pt was found hypoxic to 87% on RA. Patient states he currently has no complaints and denies any fever, chills, nausea, vomiting, chest pain, SOB, abdominal pain, palpitations or change in bowel habits.      In the ED:  Afebrile , /94, HR 99, 99% on 4L NC  Trop 516> 444  CXR clear   EKG no new changes    Patient will be admitted for AHRF 2/2 COPD exacerbation and chronic pleural effusions 2/2 advance renal failure. p.w hypoxia at nursing home. Afebrile, /94, HR 99, 99% on 4L NC  Trop 516> 444> 176> 118. CXR clear. EKG no new changes. likely 2/2 pulmonary edema and pleural effusion 2/2 renal failure. Pt could not tolerate CTA due to burning contrast. Could not tolerate V/Q Scan today.   c/w duonebs. Pending Home O2 auth by [facility]. Hx of HTN pt takes amlodipine, metolazone, lasix, and hydralazine. c/w home meds. DASh diet. ESRD on dialysis. ESRD on HD TTS at [facility], last dialysed yesterday with UF of 1.6 kilos. s/p HD yesterday, next HD Tuesday. Dr. Mosher following. pt takes ss at home. Will continue with SS. monitor blood sugars. hx of hypothryoid pt takes levothyroxine at home  c/w home meds. DVT Prophylaxis: Heparin SubQ.    Patient is medically stable and ready for discharge to [facility].   This is only a brief summary. Please refer to the chart for the full hospital course.       62 year old, Male, w/ PMH of ESRD on dialysis (Tues, Thurs, Sat), DM2, CAD s/p MI, CHF, glaucoma, legally blind, HTN, orthostatic hypotension, hyperparathyroid, hyperkalemia, LS spinal stenosis, osteoarthritis, osteoporosis, PAD, hx of osteomyelitis of thoracic vertebrae, remote hx of crack-cocaine use disorder, PSH of BKA of L leg presents after being found hypoxic at the nursing home. Per chart review, pt was found hypoxic to 87% on RA. Patient states he currently has no complaints and denies any fever, chills, nausea, vomiting, chest pain, SOB, abdominal pain, palpitations or change in bowel habits.      In the ED:  Afebrile , /94, HR 99, 99% on 4L NC  Trop 516> 444  CXR clear   EKG no new changes    Patient will be admitted for AHRF 2/2 COPD exacerbation and chronic pleural effusions 2/2 advance renal failure. p.w hypoxia at nursing home. Afebrile, /94, HR 99, 99% on 4L NC  Trop 516> 444> 176> 118. CXR clear. EKG no new changes. likely 2/2 pulmonary edema and pleural effusion 2/2 renal failure. Pt could not tolerate CTA due to burning contrast. Could not tolerate V/Q Scan due to unable to follow breathing instructions. c/w duonebs. Pending Home O2 auth by [facility]. Hx of HTN pt takes amlodipine, metolazone, lasix, and hydralazine. c/w home meds. DASh diet. ESRD on dialysis. ESRD on HD TTS at [facility], last dialysed yesterday with UF of 1.6 kilos. s/p HD yesterday, next HD Tuesday. Dr. Mosher following. pt takes ss at home. Will continue with SS. monitor blood sugars. hx of hypothryoid pt takes levothyroxine at home  c/w home meds. DVT Prophylaxis: Heparin SubQ.    Patient is medically stable and ready for discharge to [facility].   This is only a brief summary. Please refer to the chart for the full hospital course.       62 year old, Male, w/ PMH of ESRD on dialysis (Tues, Thurs, Sat), DM2, CAD s/p MI, CHF, glaucoma, legally blind, HTN, orthostatic hypotension, hyperparathyroid, hyperkalemia, LS spinal stenosis, osteoarthritis, osteoporosis, PAD, hx of osteomyelitis of thoracic vertebrae, remote hx of crack-cocaine use disorder, PSH of BKA of L leg presents after being found hypoxic at the nursing home. Per chart review, pt was found hypoxic to 87% on RA. Patient states he currently has no complaints and denies any fever, chills, nausea, vomiting, chest pain, SOB, abdominal pain, palpitations or change in bowel habits.      In the ED:  Afebrile , /94, HR 99, 99% on 4L NC  Trop 516> 444  CXR clear   EKG no new changes    Patient will be admitted for AHRF 2/2 COPD exacerbation and chronic pleural effusions 2/2 advance renal failure. p.w hypoxia at nursing home. Afebrile, /94, HR 99, 99% on 4L NC  Trop 516> 444> 176> 118. CXR clear. EKG no new changes. likely 2/2 pulmonary edema and pleural effusion 2/2 renal failure. Pt could not tolerate CTA due to burning contrast. Could not tolerate V/Q Scan due to unable to follow breathing instructions. c/w duonebs. Pending Home O2 auth by [facility]. Hx of HTN pt takes amlodipine, metolazone, lasix, and hydralazine. c/w home meds. DASh diet. ESRD on dialysis. ESRD on HD TTS at [facility], last dialysed yesterday with UF of 1.6 kilos. s/p HD yesterday, next HD Tuesday. Dr. Mosher following. pt takes ss at home. Will continue with SS. monitor blood sugars. hx of hypothryoid pt takes levothyroxine at home  c/w home meds. DVT Prophylaxis: Heparin SubQ.    Patient is medically stable and ready for discharge to [facility] assisted living facility.   This is only a brief summary. Please refer to the chart for the full hospital course.       62 year old, Male, w/ PMH of ESRD on dialysis (Tues, Thurs, Sat), DM2, CAD s/p MI, CHF, glaucoma, legally blind, HTN, orthostatic hypotension, hyperparathyroid, hyperkalemia, LS spinal stenosis, osteoarthritis, osteoporosis, PAD, hx of osteomyelitis of thoracic vertebrae, remote hx of crack-cocaine use disorder, PSH of BKA of L leg presents after being found hypoxic at the nursing home. Per chart review, pt was found hypoxic to 87% on RA. Patient states he currently has no complaints and denies any fever, chills, nausea, vomiting, chest pain, SOB, abdominal pain, palpitations or change in bowel habits.      In the ED:  Afebrile , /94, HR 99, 99% on 4L NC  Trop 516> 444  CXR clear   EKG no new changes    Patient will be admitted for AHRF 2/2 COPD exacerbation and chronic pleural effusions 2/2 advance renal failure. p.w hypoxia at nursing home. Afebrile, /94, HR 99, 99% on 4L NC  Trop 516> 444> 176> 118. CXR clear. EKG no new changes. likely 2/2 pulmonary edema and pleural effusion 2/2 renal failure. Pt could not tolerate CTA due to burning contrast. Could not tolerate V/Q Scan due to unable to follow breathing instructions. c/w duonebs. Pending Home O2 auth by [facility]. Hx of HTN pt takes amlodipine, metolazone, lasix, and hydralazine. c/w home meds. DASh diet. ESRD on dialysis. ESRD on HD TTS at [facility], last dialysed yesterday with UF of 1.6 kilos. s/p HD yesterday, next HD Tuesday. Dr. Mosher following. pt takes ss at home. Will continue with SS. monitor blood sugars. hx of hypothryoid pt takes levothyroxine at home  c/w home meds. DVT Prophylaxis: Heparin SubQ.    Patient is medically stable and ready for discharge to [facility].   This is only a brief summary. Please refer to the chart for the full hospital course.

## 2023-03-21 NOTE — PROGRESS NOTE ADULT - PROBLEM SELECTOR PLAN 1
hx of COPD and chronic pleural effusion 2/2 renal failure   p.w hypoxia at nursing home  Afebrile, /94, HR 99, 99% on 4L NC  Trop 516> 444> 176> 118  CXR clear   EKG no new changes  likely 2/2 pulmonary edema and pleural effusion 2/2 renal failure  Pt could not tolerate CTA due to burning contrast. Could not tolerate V/Q Scan today.   c/w duonebs  Pending Home O2 auth by [facility] hx of COPD and chronic pleural effusion 2/2 renal failure   p/w hypoxia at nursing home  Afebrile, /94, HR 99, 99% on 4L NC  Trop 516> 444> 176> 118  CXR clear   EKG no new changes  likely 2/2 pulmonary edema and pleural effusion 2/2 renal failure  Pt could not tolerate CTA due to burning contrast. Could not tolerate V/Q Scan due to unable to follow breathing instructions.  c/w duonebs  Pending Home O2 auth by [facility]

## 2023-03-21 NOTE — DISCHARGE NOTE PROVIDER - NSDCMRMEDTOKEN_GEN_ALL_CORE_FT
acetaminophen 325 mg oral tablet: 2 tab(s) orally every 6 hours, As needed, Temp greater or equal to 38C (100.4F), Mild Pain (1 - 3)  ALPRAZolam 0.25 mg oral tablet: 1 tab(s) orally once a day (at bedtime)  amLODIPine 10 mg oral tablet: 1 tab(s) orally once a day  budesonide-formoterol 160 mcg-4.5 mcg/inh inhalation aerosol: 2 puff(s) inhaled 2 times a day  calcium carbonate 500 mg (200 mg elemental calcium) oral tablet, chewable: 1 tab(s) orally 2 times a day  cholecalciferol oral tablet: 1000 unit(s) orally once a day  cyanocobalamin 1000 mcg oral tablet: 1 tab(s) orally once a day  diphenhydrAMINE 25 mg oral capsule: 1 cap(s) orally once a day (at bedtime), As needed, Rash and/or Itching  epoetin beverley: 4000 unit(s) intravenous Tuesday, Thursday, Saturday  ferrous sulfate 325 mg (65 mg elemental iron) oral tablet: 1 tab(s) orally once a day  folic acid 1 mg oral tablet: 1 tab(s) orally once a day  furosemide 80 mg oral tablet: 1 tab(s) orally once a day  hydrALAZINE 25 mg oral tablet: 1 tab(s) orally 3 times a day  insulin lispro 100 units/mL injectable solution: 1 unit(s) injectable 3 times a day (before meals)  1 Unit(s) if Glucose 151 - 200  2 Unit(s) if Glucose 201 - 250  3 Unit(s) if Glucose 251 - 300  4 Unit(s) if Glucose 301 - 350  5 Unit(s) if Glucose 351 - 400  6 Unit(s) if Glucose Greater Than 400  Subcutaneous three times a day before meals     insulin lispro 100 units/mL injectable solution: 0 unit(s) injectable once a day (at bedtime)  0 Unit(s) if Glucose 0 - 250  1 Unit(s) if Glucose 251 - 300  2 Unit(s) if Glucose 301 - 350  3 Unit(s) if Glucose 351 - 400  4 Unit(s) if Glucose Greater Than 400  Subcutaneous at bedtime     ipratropium-albuterol 0.5 mg-2.5 mg/3 mL inhalation solution: 3 milliliter(s) inhaled every 8 hours  levothyroxine 25 mcg (0.025 mg) oral tablet: 1 tab(s) orally once a day  lidocaine 4% patch: 1 patch transdermal once a day  Lokelma 10 g oral powder for reconstitution: 1 packet(s) orally once a day every Sun/ Mon/ Wed/ Fri  metoclopramide 10 mg oral tablet: 1 tab(s) orally 3 times a day  metOLazone 10 mg oral tablet: 1 tab(s) orally once a day  metoprolol succinate 25 mg oral tablet, extended release: 1 tab(s) orally once a day  nicotine 21 mg/24 hr transdermal film, extended release: 1 patch transdermal once a day  ondansetron 2 mg/mL injectable solution: 4 milligram(s) injectable every 8 hours, As Neededfor nausea and/or vomiting   oxyCODONE 5 mg oral tablet: 1 tab(s) orally every 8 hours, As needed, Severe Pain (7 - 10)  pantoprazole 40 mg oral delayed release tablet: 1 tab(s) orally 2 times a day  Polina-Dior oral tablet: 1 tab(s) orally once a day  simvastatin 40 mg oral tablet: 1 tab(s) orally once a day (at bedtime)  sucralfate 1 g/10 mL oral suspension: 10 milliliter(s) orally 4 times a day  zolpidem 5 mg oral tablet: 1 tab(s) orally once a day (at bedtime), As needed, Insomnia   acetaminophen 325 mg oral tablet: 2 tab(s) orally every 6 hours, As needed, Temp greater or equal to 38C (100.4F), Mild Pain (1 - 3)  ALPRAZolam 0.25 mg oral tablet: 1 tab(s) orally once a day (at bedtime)  amLODIPine 10 mg oral tablet: 1 tab(s) orally once a day  budesonide-formoterol 160 mcg-4.5 mcg/inh inhalation aerosol: 2 puff(s) inhaled 2 times a day  calcium carbonate 500 mg (200 mg elemental calcium) oral tablet, chewable: 1 tab(s) orally 2 times a day  cholecalciferol oral tablet: 1000 unit(s) orally once a day  cyanocobalamin 1000 mcg oral tablet: 1 tab(s) orally once a day  diphenhydrAMINE 25 mg oral capsule: 1 cap(s) orally once a day (at bedtime), As needed, Rash and/or Itching  epoetin beverley: 4000 unit(s) intravenous Tuesday, Thursday, Saturday  ferrous sulfate 325 mg (65 mg elemental iron) oral tablet: 1 tab(s) orally once a day  folic acid 1 mg oral tablet: 1 tab(s) orally once a day  furosemide 80 mg oral tablet: 1 tab(s) orally once a day  hydrALAZINE 25 mg oral tablet: 1 tab(s) orally 3 times a day  insulin glargine 100 units/mL subcutaneous solution: 4 unit(s) subcutaneous once a day (at bedtime)  insulin lispro 100 units/mL injectable solution: 1 unit(s) injectable 3 times a day (before meals)  1 Unit(s) if Glucose 151 - 200  2 Unit(s) if Glucose 201 - 250  3 Unit(s) if Glucose 251 - 300  4 Unit(s) if Glucose 301 - 350  5 Unit(s) if Glucose 351 - 400  6 Unit(s) if Glucose Greater Than 400  Subcutaneous three times a day before meals     insulin lispro 100 units/mL injectable solution: 0 unit(s) injectable once a day (at bedtime)  0 Unit(s) if Glucose 0 - 250  1 Unit(s) if Glucose 251 - 300  2 Unit(s) if Glucose 301 - 350  3 Unit(s) if Glucose 351 - 400  4 Unit(s) if Glucose Greater Than 400  Subcutaneous at bedtime     ipratropium-albuterol 0.5 mg-2.5 mg/3 mL inhalation solution: 3 milliliter(s) inhaled every 8 hours  levothyroxine 25 mcg (0.025 mg) oral tablet: 1 tab(s) orally once a day  lidocaine 4% patch: 1 patch transdermal once a day  Lokelma 10 g oral powder for reconstitution: 1 packet(s) orally once a day every Sun/ Mon/ Wed/ Fri  metoclopramide 10 mg oral tablet: 1 tab(s) orally 3 times a day  metOLazone 10 mg oral tablet: 1 tab(s) orally once a day  metoprolol succinate 25 mg oral tablet, extended release: 1 tab(s) orally once a day  nicotine 21 mg/24 hr transdermal film, extended release: 1 patch transdermal once a day  ondansetron 2 mg/mL injectable solution: 4 milligram(s) injectable every 8 hours, As Neededfor nausea and/or vomiting   oxyCODONE 5 mg oral tablet: 1 tab(s) orally every 8 hours, As needed, Severe Pain (7 - 10)  pantoprazole 40 mg oral delayed release tablet: 1 tab(s) orally 2 times a day  Polina-Dior oral tablet: 1 tab(s) orally once a day  simvastatin 40 mg oral tablet: 1 tab(s) orally once a day (at bedtime)  sucralfate 1 g/10 mL oral suspension: 10 milliliter(s) orally 4 times a day  zolpidem 5 mg oral tablet: 1 tab(s) orally once a day (at bedtime), As needed, Insomnia   acetaminophen 325 mg oral tablet: 2 tab(s) orally every 6 hours, As needed, Temp greater or equal to 38C (100.4F), Mild Pain (1 - 3)  ALPRAZolam 0.25 mg oral tablet: 1 tab(s) orally once a day (at bedtime)  amLODIPine 10 mg oral tablet: 1 tab(s) orally once a day  budesonide-formoterol 160 mcg-4.5 mcg/inh inhalation aerosol: 2 puff(s) inhaled 2 times a day  calcium carbonate 500 mg (200 mg elemental calcium) oral tablet, chewable: 1 tab(s) orally 2 times a day  cholecalciferol oral tablet: 1000 unit(s) orally once a day  cyanocobalamin 1000 mcg oral tablet: 1 tab(s) orally once a day  diphenhydrAMINE 25 mg oral capsule: 1 cap(s) orally once a day (at bedtime), As needed, Rash and/or Itching  epoetin beverley: 4000 unit(s) intravenous Tuesday, Thursday, Saturday  ferrous sulfate 325 mg (65 mg elemental iron) oral tablet: 1 tab(s) orally once a day  folic acid 1 mg oral tablet: 1 tab(s) orally once a day  furosemide 80 mg oral tablet: 1 tab(s) orally once a day  hydrALAZINE 25 mg oral tablet: 1 tab(s) orally 3 times a day  insulin glargine 100 units/mL subcutaneous solution: 4 unit(s) subcutaneous once a day (at bedtime)  insulin lispro 100 units/mL injectable solution: 1 unit(s) injectable 3 times a day (before meals)  1 Unit(s) if Glucose 151 - 200  2 Unit(s) if Glucose 201 - 250  3 Unit(s) if Glucose 251 - 300  4 Unit(s) if Glucose 301 - 350  5 Unit(s) if Glucose 351 - 400  6 Unit(s) if Glucose Greater Than 400  Subcutaneous three times a day before meals     insulin lispro 100 units/mL injectable solution: 0 unit(s) injectable once a day (at bedtime)  0 Unit(s) if Glucose 0 - 250  1 Unit(s) if Glucose 251 - 300  2 Unit(s) if Glucose 301 - 350  3 Unit(s) if Glucose 351 - 400  4 Unit(s) if Glucose Greater Than 400  Subcutaneous at bedtime     ipratropium-albuterol 0.5 mg-2.5 mg/3 mL inhalation solution: 3 milliliter(s) inhaled every 6 hours  levothyroxine 25 mcg (0.025 mg) oral tablet: 1 tab(s) orally once a day  lidocaine 4% patch: 1 patch transdermal once a day  Lokelma 10 g oral powder for reconstitution: 1 packet(s) orally once a day every Sun/ Mon/ Wed/ Fri  metoclopramide 10 mg oral tablet: 1 tab(s) orally 3 times a day  metOLazone 10 mg oral tablet: 1 tab(s) orally once a day  metoprolol succinate 25 mg oral tablet, extended release: 1 tab(s) orally once a day  nicotine 21 mg/24 hr transdermal film, extended release: 1 patch transdermal once a day  ondansetron 2 mg/mL injectable solution: 4 milligram(s) injectable every 8 hours, As Neededfor nausea and/or vomiting   oxyCODONE 5 mg oral tablet: 1 tab(s) orally every 8 hours, As needed, Severe Pain (7 - 10)  pantoprazole 40 mg oral delayed release tablet: 1 tab(s) orally 2 times a day  Polina-Dior oral tablet: 1 tab(s) orally once a day  simvastatin 40 mg oral tablet: 1 tab(s) orally once a day (at bedtime)  sucralfate 1 g/10 mL oral suspension: 10 milliliter(s) orally 4 times a day  zolpidem 5 mg oral tablet: 1 tab(s) orally once a day (at bedtime), As needed, Insomnia

## 2023-03-21 NOTE — CONSULT NOTE ADULT - NS ATTEND AMEND GEN_ALL_CORE FT
- N/v.  - Scant hematemesis.  - Known esophagitis.    Patient seen and examined. Refusing exam, irritated and only expressed wanting to leave the hospital to me. Hematemesis apparently resolved. Hx of known recurring scant hematemesis in setting of n/v with noncompliance with dialysis sessions resulting in uremia electrolyte derangements. Expressed importance in compliance with dialysis. PPI BID. carafate TID for one week. Prior MRI with dilated bd and pd. Recommended for EUS versus surveillance MRI but pt just wants to go home. Please reconsult GI PRN.

## 2023-03-21 NOTE — DISCHARGE NOTE PROVIDER - NSDCCAREPROVSEEN_GEN_ALL_CORE_FT
Cheko, Dominic Stephens, Sam Guerrero, Jimmy Garcia, Zak Harrington, Danielle Lee, Mike Loredo, Keira De La Torre, Felicia De La Torre, Dario Jones, Harpreet Laboy, Larry Gonzales, Darnell Garcia, [facility]

## 2023-03-21 NOTE — PROGRESS NOTE ADULT - ATTENDING COMMENTS
# RAPID RESPONSE S/T EPISODE OF DYSPNEA [3/18]  - RESOLVED WITH DUONEB ADMINISTRATION  - NOTED EKG, F/U CXR  - F/U TROPONINS  - EVALUATED BY RAPID RESPONSE TEAM  - [3/20] - EPISODE OF WHEEZING IMPROVED BY DUONEB    # ACUTE ON CHRONIC HYPOXIC RESPIRATORY FAILURE S/T ? PULMONARY EDEMA + HX OF COPD  # UNCONTROLLED HTN  # ESRD ON HD TTS - W/ HX OF NONCOMPLIANCE    - HYDRALAZINE, METOPROLOL AND NORVASC  - PLACED ON DECADRON AND LEVAQUIN RECENTLY  - SUPPLEMENTAL O2  - NEPHROLOGY CONSULT  - PULMONOLOGY CONSULT  - CARDIOLOGY CONSULT    - PATIENT WILL NEED HOME O2, CM TEAM TO HELP COORDINATION; IS 87% ON ROOM AIR AT REST    - RECOMMENDED CTANGIO CHEST BY PULMONOLOGY TEAM - PATIENT DID NOT TOLERATE. PATIENT VERBALIZED UNDERSTANDING OF RISKS OF DEFERING THIS TEST WHICH INCLUDE BUT ARE NOT LIMITED TO MISSED DIAGNOSIS, WORSENING CLINICAL CONDITION AND DEATH.    - PATIENT STARTED V/Q SCAN AND COULD NOT TOLERATE / FOLLOW THE RECOMMENDED BREATHING INSTRUCTIONS. HE REFUSED TEST.      # ELEVATED TROPONINS - IN THE SETTING OF RENAL FAILURE, ? DEMAND ISCHEMIA  - TELEMETRY  - TRENDED TROPONINS  -  ECHO 2/24/23 - TRACE AR, [facility]CENTRIC LVH, G1DD  - CARDIOLOGY CONSULT    # EPISODE OF SMALL VOLUME EMESIS W/ STREAKING OF HEMATEMESIS  # RECURRENT INTRACTABLE NAUSEA/VOMITING  # HISTORY OF ESOPHAGITIS AND DUODENITIS [1/2021], HX OF GASTROPARESIS W/ EVIDENCE OF THICKENED ESOPHAGUS ON PREVIOUS CT SCAN   - EGD AT [facility] - DEMONSTRATED RETAINED FOOD PRODUCT IN STOMACH, RECOMMENDED FOR GASTRIC EMPTYING STUDY  - DENIES RECENT HEMATEMESIS   - MONITORING HGB, PLACED ON PPI BID  - CARAFATE, PRN ANTIEMETICS  - MONITORING FOR SYMPTOMS  - TOLERATING DIET ; PATIENT REPEATEDLY COUNSELLED TO CHEW FOOD CAUTIOUSLY AND CONSUME SMALL BITES W/ REGULAR CLEARING WITH WATER BETWEEN BITES.      - PATIENT RECENTLY STARTED EVALUATION TO R/O PANCREATIC HEAD MASS, UNDERWENT MRCP, PLANNED FOR OUTPATIENT F/U    - GI CONSULT    # HYPOGLYCEMIC EPISODE, LABILE BLOOD GLUCOSE  # SUSPECTED GASTROPARESIS  # UNDERLYING DM  - SSI + FS  - ENDOCRINOLOGY CONSULT    # ? HEMOCHROMATOSIS  - NOTED FERRITIN, F/U IRON AND TIBC, TRANSFERRIN SAT  - WILL REFER HEPATOLOGY AS OUTPATIENT PENDING ABOVE WORKUP    # PATIENT UNDERGOING OUTPATIENT WORKUP FOR CENTRAL VENOUS STENOSIS THROUGH NEPHROLOGY TEAM    # ANEMIA OF CKD  # HX OF PANCYTOPENIA   - TREND HGB, TRANSFUSION THRESHOLD HGB < 7  - TYPE AND SCREEN  - ON EPO  - DENIES RECENT HEMATEMESIS, MELENA, HEMATOCHEZIA  - HEME/ONC CONSULT    # SEVERE PROTEIN CALORIE MALNUTRITION, FAILURE TO THRIVE - NUTRITIONAL SUPPLEMENT    # HLD  # PARTIALLY BLIND  # S/P LEFT BKA  # HX OF PVD  # LS SPINAL STENOSIS  # GI AND DVT PPX. CM TEAM, [facility] STAFF - S/W PATIENT REGARDING D/C TO [facility] - PATIENT IS AGREEABLE    # RAPID RESPONSE S/T EPISODE OF DYSPNEA [3/18]  - RESOLVED WITH DUONEB ADMINISTRATION  - NOTED EKG, F/U CXR  - F/U TROPONINS  - EVALUATED BY RAPID RESPONSE TEAM  - [3/20] - EPISODE OF WHEEZING IMPROVED BY DUONEB    # ACUTE ON CHRONIC HYPOXIC RESPIRATORY FAILURE S/T ? PULMONARY EDEMA + HX OF COPD  # UNCONTROLLED HTN  # ESRD ON HD TTS - W/ HX OF NONCOMPLIANCE    - HYDRALAZINE, METOPROLOL AND NORVASC  - PLACED ON DECADRON AND LEVAQUIN RECENTLY  - SUPPLEMENTAL O2  - NEPHROLOGY CONSULT  - PULMONOLOGY CONSULT  - CARDIOLOGY CONSULT    - PATIENT WILL NEED HOME O2, CM TEAM TO HELP COORDINATION; IS 87% ON ROOM AIR AT REST    - RECOMMENDED CTANGIO CHEST BY PULMONOLOGY TEAM - PATIENT DID NOT TOLERATE. PATIENT VERBALIZED UNDERSTANDING OF RISKS OF DEFERING THIS TEST WHICH INCLUDE BUT ARE NOT LIMITED TO MISSED DIAGNOSIS, WORSENING CLINICAL CONDITION AND DEATH.    - PATIENT STARTED V/Q SCAN AND COULD NOT TOLERATE / FOLLOW THE RECOMMENDED BREATHING INSTRUCTIONS. HE REFUSED TEST.      # ELEVATED TROPONINS - IN THE SETTING OF RENAL FAILURE, ? DEMAND ISCHEMIA  - TELEMETRY  - TRENDED TROPONINS  -  ECHO 2/24/23 - TRACE AR, CONCENTRIC LVH, G1DD  - CARDIOLOGY CONSULT    # EPISODE OF SMALL VOLUME EMESIS W/ STREAKING OF HEMATEMESIS  # RECURRENT INTRACTABLE NAUSEA/VOMITING  # HISTORY OF ESOPHAGITIS AND DUODENITIS [1/2021], HX OF GASTROPARESIS W/ EVIDENCE OF THICKENED ESOPHAGUS ON PREVIOUS CT SCAN   - EGD AT [facility] - DEMONSTRATED RETAINED FOOD PRODUCT IN STOMACH, RECOMMENDED FOR GASTRIC EMPTYING STUDY  - DENIES RECENT HEMATEMESIS   - MONITORING HGB, PLACED ON PPI BID  - CARAFATE, PRN ANTIEMETICS  - MONITORING FOR SYMPTOMS  - TOLERATING DIET ; PATIENT REPEATEDLY COUNSELLED TO CHEW FOOD CAUTIOUSLY AND CONSUME SMALL BITES W/ REGULAR CLEARING WITH WATER BETWEEN BITES.      - PATIENT RECENTLY STARTED EVALUATION TO R/O PANCREATIC HEAD MASS, UNDERWENT MRCP, PLANNED FOR OUTPATIENT F/U    - GI CONSULT    # HYPOGLYCEMIC EPISODE, LABILE BLOOD GLUCOSE  # SUSPECTED GASTROPARESIS  # UNDERLYING DM  - SSI + FS  - ENDOCRINOLOGY CONSULT    # ? HEMOCHROMATOSIS  - NOTED FERRITIN, F/U IRON AND TIBC, TRANSFERRIN SAT  - WILL REFER HEPATOLOGY AS OUTPATIENT PENDING ABOVE WORKUP    # PATIENT UNDERGOING OUTPATIENT WORKUP FOR CENTRAL VENOUS STENOSIS THROUGH NEPHROLOGY TEAM    # ANEMIA OF CKD  # HX OF PANCYTOPENIA   - TREND HGB, TRANSFUSION THRESHOLD HGB < 7  - TYPE AND SCREEN  - ON EPO  - DENIES RECENT HEMATEMESIS, MELENA, HEMATOCHEZIA  - HEME/ONC CONSULT    # SEVERE PROTEIN CALORIE MALNUTRITION, FAILURE TO THRIVE - NUTRITIONAL SUPPLEMENT    # HLD  # PARTIALLY BLIND  # S/P LEFT BKA  # HX OF PVD  # LS SPINAL STENOSIS  # GI AND DVT PPX.

## 2023-03-21 NOTE — CONSULT NOTE ADULT - SUBJECTIVE AND OBJECTIVE BOX
C A R D I O L O G Y  *********************    DATE OF SERVICE: 03-16-23    HISTORY OF PRESENT ILLNESS: HPI:  62 year old, Male, w/ PMH of ESRD on dialysis (Tues, Thurs, Sat), DM2, CAD s/p MI, normal LV and RV function no ischemia on stress 4/22 , glaucoma, legally blind, HTN, orthostatic hypotension, hyperparathyroid, hyperkalemia, LS spinal stenosis, osteoarthritis, osteoporosis, PAD, hx of osteomyelitis of thoracic vertebrae, remote hx of crack-cocaine use disorder, PSH of left BKA presents after being found hypoxic at the nursing home. Per chart review, pt was found hypoxic to 87% on RA. Patient states he currently has no complaints and denies any fever, chills, nausea, vomiting, chest pain, SOB, abdominal pain, palpitations or change in bowel habits.  Cardiology is called for mild elevation in trop.    In the ED:  Afebrile , /94, HR 99, 99% on 4L NC  Trop 516> 444  CXR clear   EKG no new changes   (15 Mar 2023 16:02)      PAST MEDICAL & SURGICAL HISTORY:  Hypertension  Adrenal insufficiency  h/o  Anemia  Glaucoma  Coronary artery disease  HLD (hyperlipidemia)  Peripheral vascular disease  Spinal stenosis of lumbosacral region  Hyperparathyroidism  Diabetes mellitus  Diabetic neuropathy  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  Osteoarthritis  Vision loss of left eye  blind  ESRD on hemodialysis  T/Th/S  Cataract  both eyes - hx of sx done  BPH (benign prostatic hyperplasia)  UTI (urinary tract infection)  hx of  Bladder mass  hx of  H/O hematuria  Osteoporosis  Vision loss of right eye  decreased  Depression  Chronic GERD  Osteomyelitis of vertebra  CHF (congestive heart failure)  Below knee amputation status, left  2012- pt is wearing prostesis      History of right cataract extraction      History of left cataract extraction      S/P arteriovenous (AV) fistula creation  right arm brachiocephalic arteriovenous fistula on 11/08/2018      H/O hematuria  s/p bladder bx and fulguration 2/25/2020      H/O transurethral destruction of bladder lesion  2020      History of excision of mass  back mass on 03/31/2021              MEDICATIONS:  MEDICATIONS  (STANDING):  albuterol/ipratropium for Nebulization 3 milliLiter(s) Nebulizer every 6 hours  ALPRAZolam 0.25 milliGRAM(s) Oral at bedtime  amLODIPine   Tablet 10 milliGRAM(s) Oral daily  calcium carbonate    500 mG (Tums) Chewable 1 Tablet(s) Chew two times a day  chlorhexidine 2% Cloths 1 Application(s) Topical daily  epoetin beverley-epbx (RETACRIT) Injectable 86017 Unit(s) IV Push <User Schedule>  ferrous    sulfate 325 milliGRAM(s) Oral daily  furosemide    Tablet 80 milliGRAM(s) Oral daily  heparin   Injectable 5000 Unit(s) SubCutaneous every 8 hours  hydrALAZINE 25 milliGRAM(s) Oral three times a day  insulin lispro (ADMELOG) corrective regimen sliding scale   SubCutaneous three times a day before meals  levothyroxine 25 MICROGram(s) Oral daily  metoclopramide 10 milliGRAM(s) Oral three times a day  metolazone 10 milliGRAM(s) Oral daily  metoprolol succinate ER 25 milliGRAM(s) Oral daily  pantoprazole    Tablet 40 milliGRAM(s) Oral before breakfast  simvastatin 40 milliGRAM(s) Oral at bedtime  sucralfate suspension 1 Gram(s) Oral four times a day      Allergies    fish (Rash)  liver (Anaphylaxis)  No Known Drug Allergies    Intolerances        FAMILY HISTORY:  Family history of cirrhosis of liver (Mother)    Family history of renal failure (Sibling)    Family history of hypertension (Sibling)    Family history of diabetes mellitus (Sibling)    History of substance abuse in sibling (Sibling)      Non-contributary for premature coronary disease or sudden cardiac death    SOCIAL HISTORY:    [X ] Non-smoker  [ ] Smoker  [ ] Alcohol        REVIEW OF SYSTEMS:  [ ]chest pain  [  ]shortness of breath  [  ]palpitations  [  ]syncope  [ ]near syncope [ ]upper extremity weakness   [ ] lower extremity weakness  [  ]diplopia  [  ]altered mental status   [  ]fevers  [ ]chills [ ]nausea  [ ]vomitting  [  ]dysphagia    [ ]abdominal pain  [ ]melena  [ ]BRBPR    [  ]epistaxis  [  ]rash    [ ]lower extremity edema        [X] All others negative	  [ ] Unable to obtain      LABS:	 	    CARDIAC MARKERS:        Troponin I, High Sensitivity Result: 444.6 ng/L (03-15-23 @ 06:18)  Troponin I, High Sensitivity Result: 516.7 ng/L (03-15-23 @ 03:00)                            8.4    3.14  )-----------( 105      ( 16 Mar 2023 10:30 )             27.1     Hb Trend: 8.4<--    03-16    138  |  95<L>  |  48<H>  ----------------------------<  104<H>  4.5   |  30  |  11.00<H>    Ca    8.6      16 Mar 2023 10:30  Phos  5.7     03-16  Mg     2.9     03-16    TPro  6.7  /  Alb  3.2<L>  /  TBili  0.6  /  DBili  x   /  AST  18  /  ALT  14  /  AlkPhos  61  03-15    Creatinine Trend: 11.00<--, 8.05<--, 12.50<--, 11.00<--, 13.20<--, 12.40<--        PHYSICAL EXAM:  T(C): 36.8 (03-16-23 @ 10:30), Max: 37.3 (03-15-23 @ 15:48)  HR: 95 (03-16-23 @ 10:30) (84 - 101)  BP: 143/73 (03-16-23 @ 10:30) (136/67 - 198/95)  RR: 18 (03-16-23 @ 10:30) (18 - 19)  SpO2: 98% (03-16-23 @ 10:30) (87% - 100%)  Wt(kg): --   BMI (kg/m2): 18.9 (03-15-23 @ 11:08)  I&O's Summary      GEN: L BKA  HEENT:  (-)icterus (-)pallor  CV: N S1 S2 1/6 DIANA (+)2 Pulses B/l  Resp:  Clear to ausculatation B/L, normal effort  GI: (+) BS Soft, NT, ND  Lymph:  (-)Edema, (-)obvious lymphadenopathy  Skin: Warm to touch, Normal turgor  Psych: Appropriate mood and affect      ECG:  	Sinus 97 BPM, LAD nonspecific T wave abnormality     RADIOLOGY:         CXR:   No infiltrate     ASSESSMENT/PLAN: 	62y Male  PMH of ESRD on dialysis (Tues, Thurs, Sat), DM2, CAD s/p MI, normal LV and RV function no ischemia on stress 4/22 , glaucoma, legally blind, HTN, orthostatic hypotension, hyperparathyroid, hyperkalemia, LS spinal stenosis, osteoarthritis, osteoporosis, PAD, hx of osteomyelitis of thoracic vertebrae, remote hx of crack-cocaine use disorder, PSH of left BKA presents after being found hypoxic at the nursing home.    # NSTEMI  - Mild elevation in the setting of hypoxia and ESRD likely multifactorial.  Hypoxia, elevated cardiac filling pressures.  He has no Chest pain and no injury or ischemia on EKG presentation is inconsistent with ACS  - check echo  - Stress test less than 1 year ago revealed normal perfusion     # SOB  - Oupt of proportion to xray findings would consider pulmonary eval as i  suspect a primary pulmonary process  - no clinical CHF    # ESRD   - HD per renal    I once again thank you for allowing me to participate in the care of your patient.  If you have any questions or concerns please do not hesitate to contact me.    Dominic Still MD, [facility]  BEEPER [phone]    
Time of visit:    CHIEF COMPLAINT: Patient is a 62y old  Male who presents with a chief complaint of hypoxia (15 Mar 2023 15:14)      HPI:  62 year old, Male, w/ PMH of ESRD on dialysis (Tues, Thurs, Sat), DM2, CAD s/p MI, CHF, glaucoma, legally blind, HTN, orthostatic hypotension, hyperparathyroid, hyperkalemia, LS spinal stenosis, osteoarthritis, osteoporosis, PAD, hx of osteomyelitis of thoracic vertebrae, remote hx of crack-cocaine use disorder, PSH of BKA of L leg presents after being found hypoxic at the nursing home. Per chart review, pt was found hypoxic to 87% on RA. Patient states he currently has no complaints and denies any fever, chills, nausea, vomiting, chest pain, SOB, abdominal pain, palpitations or change in bowel habits.      In the ED:  Afebrile , /94, HR 99, 99% on 4L NC  Trop 516> 444  CXR clear   EKG no new changes   (15 Mar 2023 16:02)   Patient seen and examined.     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      Osteoarthritis      Vision loss of left eye  blind      ESRD on hemodialysis  T/Th/S      Cataract  both eyes - hx of sx done      BPH (benign prostatic hyperplasia)      UTI (urinary tract infection)  hx of      Bladder mass  hx of      H/O hematuria      Osteoporosis      Vision loss of right eye  decreased      Depression      Chronic GERD      Osteomyelitis of vertebra      CHF (congestive heart failure)      Below knee amputation status, left  2012- pt is wearing prostesis      History of right cataract extraction      History of left cataract extraction      S/P arteriovenous (AV) fistula creation  right arm brachiocephalic arteriovenous fistula on 11/08/2018      H/O hematuria  s/p bladder bx and fulguration 2/25/2020      H/O transurethral destruction of bladder lesion  2020      History of excision of mass  back mass on 03/31/2021          Allergies    fish (Rash)  liver (Anaphylaxis)  No Known Drug Allergies    Intolerances        MEDICATIONS  (STANDING):  albuterol/ipratropium for Nebulization 3 milliLiter(s) Nebulizer every 6 hours  ALPRAZolam 0.25 milliGRAM(s) Oral at bedtime  amLODIPine   Tablet 10 milliGRAM(s) Oral daily  calcium carbonate    500 mG (Tums) Chewable 1 Tablet(s) Chew two times a day  chlorhexidine 2% Cloths 1 Application(s) Topical daily  epoetin beverley-epbx (RETACRIT) Injectable 06547 Unit(s) IV Push <User Schedule>  ferrous    sulfate 325 milliGRAM(s) Oral daily  furosemide    Tablet 80 milliGRAM(s) Oral daily  heparin   Injectable 5000 Unit(s) SubCutaneous every 8 hours  hydrALAZINE 25 milliGRAM(s) Oral three times a day  insulin lispro (ADMELOG) corrective regimen sliding scale   SubCutaneous three times a day before meals  levothyroxine 25 MICROGram(s) Oral daily  metoclopramide 10 milliGRAM(s) Oral three times a day  metolazone 10 milliGRAM(s) Oral daily  metoprolol succinate ER 25 milliGRAM(s) Oral daily  pantoprazole    Tablet 40 milliGRAM(s) Oral before breakfast  sevelamer carbonate 800 milliGRAM(s) Oral three times a day with meals  simvastatin 40 milliGRAM(s) Oral at bedtime  sucralfate suspension 1 Gram(s) Oral four times a day      MEDICATIONS  (PRN):  acetaminophen     Tablet .. 650 milliGRAM(s) Oral every 6 hours PRN Temp greater or equal to 38C (100.4F), Mild Pain (1 - 3)  oxyCODONE    IR 5 milliGRAM(s) Oral every 8 hours PRN Severe Pain (7 - 10)  zolpidem 5 milliGRAM(s) Oral at bedtime PRN Insomnia   Medications up to date at time of exam.    Medications up to date at time of exam.    FAMILY HISTORY:  Family history of cirrhosis of liver (Mother)    Family history of renal failure (Sibling)    Family history of hypertension (Sibling)    Family history of diabetes mellitus (Sibling)    History of substance abuse in sibling (Sibling)        SOCIAL HISTORY  Smoking History: [   ] smoking/smoke exposure, [   ] former smoker  Living Condition: [   ] apartment, [   ] private house  Work History:   Travel History: denies recent travel  Illicit Substance Use: denies  Alcohol Use: denies    REVIEW OF SYSTEMS:    CONSTITUTIONAL:  denies fevers, chills, sweats, weight loss    HEENT:  denies diplopia or blurred vision, sore throat or runny nose.    CARDIOVASCULAR:  denies pressure, squeezing, tightness, or heaviness about the chest; no palpitations.    RESPIRATORY:  denies SOB, cough, EDGE, wheezing.    GASTROINTESTINAL:  denies abdominal pain, nausea, vomiting or diarrhea.    GENITOURINARY: denies dysuria, frequency or urgency.    NEUROLOGIC:  denies numbness, tingling, seizures or weakness.    PSYCHIATRIC:  denies disorder of thought or mood.    MSK: denies swelling, redness      PHYSICAL EXAMINATION:    GENERAL: The patient is a well-developed, well-nourished, in no apparent distress.     Vital Signs Last 24 Hrs  T(C): 37.6 (16 Mar 2023 14:27), Max: 37.6 (16 Mar 2023 14:27)  T(F): 99.7 (16 Mar 2023 14:27), Max: 99.7 (16 Mar 2023 14:27)  HR: 94 (16 Mar 2023 14:27) (84 - 101)  BP: 166/89 (16 Mar 2023 14:27) (136/67 - 198/95)  BP(mean): 111 (15 Mar 2023 21:15) (111 - 111)  RR: 19 (16 Mar 2023 14:27) (18 - 20)  SpO2: 94% (16 Mar 2023 14:27) (92% - 100%)    Parameters below as of 16 Mar 2023 14:27  Patient On (Oxygen Delivery Method): mask, nonrebreather  O2 Flow (L/min): 3     (if applicable)    Chest Tube (if applicable)    HEENT: Head is normocephalic and atraumatic. Extraocular muscles are intact. Mucous membranes are moist.     NECK: Supple, no palpable adenopathy.    LUNGS: Clear to auscultation, no wheezing, rales, or rhonchi.    HEART: Regular rate and rhythm without murmur.    ABDOMEN: Soft, nontender, and nondistended.  No hepatosplenomegaly is noted.    RENAL: No difficulty voiding, no pelvic pain    EXTREMITIES: left BKA     NEUROLOGIC: Awake, alert, oriented, grossly intact    SKIN: Warm, dry, good turgor.      LABS:                        8.4    3.14  )-----------( 105      ( 16 Mar 2023 10:30 )             27.1     03-16    138  |  95<L>  |  48<H>  ----------------------------<  104<H>  4.5   |  30  |  11.00<H>    Ca    8.6      16 Mar 2023 10:30  Phos  5.7     03-16  Mg     2.9     03-16    TPro  6.7  /  Alb  3.2<L>  /  TBili  0.6  /  DBili  x   /  AST  18  /  ALT  14  /  AlkPhos  61  03-15                        MICROBIOLOGY: (if applicable)    RADIOLOGY & ADDITIONAL STUDIES:           MECHELLE SEAMAN DO; Attending Radiologist  This document has been electronically signed. Mar 15 2023  8:50AM    < end of copied text >    ECHO:< from: Transthoracic Echocardiogram (02.24.23 @ 08:51) >    Patient name: KIAN VALERO  YOB: 1960   Age: 62 (M)   MR#: [id number]  Study Date: 2/24/2023  Location: [address] [place], [place] [address]Sonographer: Vignesh Zazueta RDCS  Study quality: Technically good  Referring Physician: Dario De La Torre MD  Blood Pressure: 147/68 mmHg  Height: 168 cm  Weight: 140 kg  BSA: 2.4 m2  ------------------------------------------------------------------------    PROCEDURE: Transthoracic echocardiogram with 2-D, M-Mode  and complete spectral and color flow Doppler.  INDICATION: Heart failure, unspecified (I50.9)  HISTORY:  ------------------------------------------------------------------------  DIMENSIONS:  Dimensions:     Normal Values:  LA:     3.7 cm    2.0 - 4.0 cm  Ao:     3.5 cm    2.0 - 3.8 cm  SEPTUM: 1.2 cm    0.6 - 1.2 cm  PWT:    1.3 cm    0.6 - 1.1 cm  LVIDd:  5.1 cm    3.0 - 5.6 cm  LVIDs:  3.8 cm    1.8 - 4.0 cm      Derived Variables:  LVMI: 106 g/m2  RWT: 0.50  Ejection Fraction Visual Estimate: 45-50 %  Ejection Fraction Lugo: 45 %    ------------------------------------------------------------------------  OBSERVATIONS:  Mitral Valve: Normal mitral valve. Mild mitral  regurgitation.  Aortic Root: Aortic Root: 3.5 cm.    Aortic Valve: Normal trileaflet aortic valve. Trace aortic  regurgitation.  LeftAtrium: Normal left atrium.  LA volume index = 34  cc/m2.  Left Ventricle: Mild global left ventricular systolic  dysfunction. Mild concentric left ventricular hypertrophy.  Grade I diastolic dysfunction (Impaired relaxation, mild).  Right Heart: Normal right atrium. Normal right ventricular  size and systolic function (TAPSE  1.9cm). There is trace  tricuspid regurgitation. Normal pulmonic valve.  Pericardium/PleuraTrivial pericardial effusion is seen.  Hemodynamic: Unable to estimate RVSP.  ------------------------------------------------------------------------  CONCLUSIONS:  1. Normal mitral valve. Mild mitral regurgitation.  2. Normal trileaflet aortic valve. Trace aortic  regurgitation.  3. Aortic Root: 3.5 cm.  4. Normal left atrium.  LA volume index = 34 cc/m2.  5. Mild concentric left ventricular hypertrophy.  6. Mild global left ventricular systolic dysfunction.  7. Grade I diastolic dysfunction (Impaired relaxation,  mild).  8. Normal right atrium.  9. Normal right ventricular size and systolic function  (TAPSE  1.9cm).  10. Unable to estimate RVSP.  11. There is trace tricuspid regurgitation.  12. Normal pulmonic valve.  13. Trivial pericardial effusion is seen.    ------------------------------------------------------------------------  Confirmed on  2/25/2023 - 10:35:55 by Nikki Richardson MD  ------------------------------------------------------------------------    < end of copied text >      IMPRESSION: 62y Male 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      Osteoarthritis      Vision loss of left eye  blind      ESRD on hemodialysis  T/Th/S      Cataract  both eyes - hx of sx done      BPH (benign prostatic hyperplasia)      UTI (urinary tract infection)  hx of      Bladder mass  hx of      H/O hematuria      Osteoporosis      Vision loss of right eye  decreased      Depression      Chronic GERD      Osteomyelitis of vertebra      CHF (congestive heart failure)      Below knee amputation status, left  2012- pt is wearing prostesis          IMP: This is a 62 year old, man   ESRD on dialysis ([facility], Sat), DM2, CAD s/p MI, CHF, glaucoma, legally blind, HTN, orthostatic hypotension, hyperparathyroid, hyperkalemia, LS spinal stenosis, osteoarthritis, osteoporosis, PAD, hx of osteomyelitis of thoracic vertebrae, remote hx of crack-cocaine use disorder, PSH of BKA of L leg presents after being found hypoxic at the nursing home. CXR do not show pul edema and no physical finding to suggest CHF     Sugg  - CTPA to evaluate for PE and evaluate of parenchyma   - Repeat 2 DECHO to look at PAP since pat has sob requiring supp O2   - Compliance with HD   - Add Symbicort 160/ 4.5 q12h   - Out pat pulmo f/u   - PFT as out pat       
[facility] GI CONSULTATION    Patient is a 62y old  Male who presents with a chief complaint of hypoxia (15 Mar 2023 15:14)    HPI:  62 year old, Male, w/ PMH of ESRD on dialysis (Tues, Thurs, Sat), DM2, CAD s/p MI, CHF, glaucoma, legally blind, HTN, orthostatic hypotension, hyperparathyroid, hyperkalemia, LS spinal stenosis, osteoarthritis, osteoporosis, PAD, hx of osteomyelitis of thoracic vertebrae, remote hx of crack-cocaine use disorder, PSH of BKA of L leg presents after being found hypoxic at the nursing home. Per chart review, pt was found hypoxic to 87% on RA. Patient states he currently has no complaints and denies any fever, chills, nausea, vomiting, chest pain, SOB, abdominal pain, palpitations or change in bowel habits.      In the ED:  Afebrile , /94, HR 99, 99% on 4L NC  Trop 516> 444  CXR clear   EKG no new changes   (15 Mar 2023 16:02)      PMH/PSH:  PAST MEDICAL & SURGICAL HISTORY:  Hypertension  Adrenal insufficiency  h/o  Anemia  Glaucoma  Coronary artery disease  HLD (hyperlipidemia)  Peripheral vascular disease  Spinal stenosis of lumbosacral region  Hyperparathyroidism  Diabetes mellitus  Diabetic neuropathy  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  Osteoarthritis  Vision loss of left eye  blind  ESRD on hemodialysis  T/Th/S  Cataract  both eyes - hx of sx done  BPH (benign prostatic hyperplasia)  UTI (urinary tract infection)  hx of  Bladder mass  hx of  H/O hematuria  Osteoporosis  Vision loss of right eye  decreased  Depression  Chronic GERD  Osteomyelitis of vertebra  CHF (congestive heart failure)  Below knee amputation status, left  2012- pt is wearing prostesis  History of right cataract extraction  History of left cataract extraction      S/P arteriovenous (AV) fistula creation  right arm brachiocephalic arteriovenous fistula on 11/08/2018      H/O hematuria  s/p bladder bx and fulguration 2/25/2020      H/O transurethral destruction of bladder lesion  2020      History of excision of mass  back mass on 03/31/2021        FH:  FAMILY HISTORY:  Family history of cirrhosis of liver (Mother)    Family history of renal failure (Sibling)    Family history of hypertension (Sibling)    Family history of diabetes mellitus (Sibling)    History of substance abuse in sibling (Sibling)      MEDS:  MEDICATIONS  (STANDING):  albuterol/ipratropium for Nebulization 3 milliLiter(s) Nebulizer every 6 hours  ALPRAZolam 0.25 milliGRAM(s) Oral at bedtime  amLODIPine   Tablet 10 milliGRAM(s) Oral daily  budesonide 160 MICROgram(s)/formoterol 4.5 MICROgram(s) Inhaler 2 Puff(s) Inhalation two times a day  calcium carbonate    500 mG (Tums) Chewable 1 Tablet(s) Chew two times a day  chlorhexidine 2% Cloths 1 Application(s) Topical daily  epoetin beverley-epbx (RETACRIT) Injectable 09217 Unit(s) IV Push <User Schedule>  ferrous    sulfate 325 milliGRAM(s) Oral daily  furosemide    Tablet 80 milliGRAM(s) Oral daily  heparin   Injectable 5000 Unit(s) SubCutaneous every 8 hours  hydrALAZINE 25 milliGRAM(s) Oral three times a day  insulin glargine Injectable (LANTUS) 4 Unit(s) SubCutaneous at bedtime  insulin lispro (ADMELOG) corrective regimen sliding scale   SubCutaneous three times a day before meals  insulin lispro (ADMELOG) corrective regimen sliding scale   SubCutaneous at bedtime  levothyroxine 25 MICROGram(s) Oral daily  metoclopramide 10 milliGRAM(s) Oral three times a day  metolazone 10 milliGRAM(s) Oral daily  metoprolol succinate ER 25 milliGRAM(s) Oral daily  pantoprazole    Tablet 40 milliGRAM(s) Oral two times a day  sevelamer carbonate 800 milliGRAM(s) Oral three times a day with meals  simvastatin 40 milliGRAM(s) Oral at bedtime  sucralfate 1 Gram(s) Oral four times a day    MEDICATIONS  (PRN):  acetaminophen     Tablet .. 650 milliGRAM(s) Oral every 6 hours PRN Temp greater or equal to 38C (100.4F), Mild Pain (1 - 3)  oxyCODONE    IR 5 milliGRAM(s) Oral every 8 hours PRN Severe Pain (7 - 10)  zolpidem 5 milliGRAM(s) Oral at bedtime PRN Insomnia    Allergies    fish (Rash)  liver (Anaphylaxis)  No Known Drug Allergies  Intolerances        ROS: A detailed set of ROS were asked and negative except those outlined in GI HPI.  ______________________________________________________________________  PHYSICAL EXAM:  T(C): 36.6 (03-21-23 @ 05:26), Max: 37.1 (03-20-23 @ 20:38)  HR: 90 (03-21-23 @ 05:26)  BP: 140/75 (03-21-23 @ 05:26)  RR: 18 (03-21-23 @ 05:26)  SpO2: 100% (03-21-23 @ 05:26)  Wt(kg): --      GEN: NAD  HEENT: legally blind b/l eyes, clouded pupils, conjunctivae anicteric, neck supple, dry mucous membranes  PULM: +wheezing  CV: RRR, no m/r/b  GI: Soft, NT, ND; +BS in all four quadrants, no ascites, no [place]'s sign  MSK: BKA to Left lower extremity  NEURO: A&O x 3, no gross deficits  ______________________________________________________________________  LABS:      Refused labs.        ____________________________________________    IMAGING:      MR MRCP   ORDERED BY: SUSAN GILLETTE     PROCEDURE DATE:  02/23/2023          INTERPRETATION:  CLINICAL INFORMATION: Dilated common bile duct and main   pancreatic duct. Possible pancreatic head mass.    COMPARISON: No prior abdominal MR is available for comparison. Reference   is made with a previous abdominal CT dated 2/21/2023    CONTRAST/COMPLICATIONS:  IV Contrast: NONE  Oral Contrast: NONE  Complications: None reported at time of study completion.  The examination is limited due to patient's refusal to continue the   examination. MRCP images are not acquired.    PROCEDURE:  MRI of the abdomen was performed.    FINDINGS: Evaluation is limited by incomplete examination.  LOWER CHEST: Mild bilateral pleural effusions.    LIVER: Diffusely T2 dark. In addition, there is signal attenuation on   in-phase T1-weighted gradient echo images. Findings are compatible with   diffuse iron deposition. A few small brightly T2 hyperintense lesions in   the liver, likely representing cysts or hemangiomas.  BILE DUCTS: Dilated common bile duct measuring up to 14 mm in caliber. No   gross evidence for choledocholithiasis.  GALLBLADDER: No evidence for gallstones, or thickened gallbladder wall.   Small cystic lesion in the gallbladder fundal wall, likely representing   adenomyomatosis. Nonspecific small pericholecystic fluid.  SPLEEN: Diffusely T2 dark. In addition, there is signal attenuation on   in-phase T1-weighted gradient echo images. Findings are compatible with   diffuse iron deposition.  PANCREAS: The main pancreatic duct is dilated measuring up to 7 mm in   caliber. No gross pancreatic mass is identified at MR.  ADRENALS: Within normal limits.  KIDNEYS/URETERS: Multiple small brightly T2 hyperintense lesions in the   kidneys bilaterally, representing probable cysts.    VISUALIZED PORTIONS:  BOWEL: Dilated stomach.  PERITONEUM: Trace ascites.  VESSELS: Within normal limits.  RETROPERITONEUM/LYMPH NODES: Nonspecific mildly enlarged 1.2 cm left   periaortic lymph node.  ABDOMINAL WALL: Within normal limits.  BONES: Within normal limits.    IMPRESSION:  Diffuse signal abnormality of the liver and spleen, suggestive of   hemachromatosis. Clinical correlation with serum ferritin level is   recommended.    No gross evidence for choledocholithiasis. Dilated common bile duct and   main pancreatic duct. No gross pancreatic mass is identified. Given   dilatation of both ducts, endoscopic ultrasound may be pursued to rule   out an obstructive pancreatic head mass or ampullary tumor.    No evidence for gallstones, or thickened gallbladder wall. Small cystic   lesion in the gallbladder fundal wall, likely representing   adenomyomatosis. Nonspecific small pericholecystic fluid.    Dilated stomach.    Trace ascites.    Nonspecific mildly enlarged 1.2 cm left periaortic lymph node.    Mild bilateral pleural effusions.    XR CHEST PORTABLE URGENT 1V   ORDERED BY: RUBA ISLAS     PROCEDURE DATE:  03/18/2023          INTERPRETATION:  INDICATION: Hypoxia. S/P RRT    COMPARISON: 3/14/23    Technique: AP radiograph of the chest    FINDINGS:  Heart/Vascular: Difficult to assess heart size on this single AP film.  Pulmonary: Increased reticular opacities in both mid and lower lungs   likely due to mild pulmonary edema. No focal consolidations. No sizable   pleural effusion. No pneumothorax.  Bones: No acute bony finding  Other: Right axillary graft.    Impression:  Mild pulmonary edema changes.  
[facility] : Progress Note :: [phone], (office [phone]),   Dr Mosher / Dr Washington / Dr Young / Dr Doll / Dr Varsha TURCIOS / Dr Tello / Dr Garcia / Dr Larry qiu  _____________________________________________________________________________________________  Patient is a 62y Male whom presented to the hospital with hypoxia.  He has ESRD on HD TTS at [facility], last dialysed yesterday with UF of 1.6 kilos. Presented to ED yesterday with vomitting and generalized weakness.  upon discharge, was noted to be hypoxic sats 88% on RA, thus re-sent to ED. sats was 99% on oxygen. a CXR did not reveal any pulmonary edema.  at time of exam, he denies any SOB and is talking in complete sentences off oxygen. had an episode of vomitting in ED.  renal consulted for ESRD.    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      Osteoarthritis      Vision loss of left eye  blind      ESRD on hemodialysis  T/Th/S      Cataract  both eyes - hx of sx done      BPH (benign prostatic hyperplasia)      UTI (urinary tract infection)  hx of      Bladder mass  hx of      H/O hematuria      Osteoporosis      Vision loss of right eye  decreased      Depression      Chronic GERD      Osteomyelitis of vertebra      CHF (congestive heart failure)      Below knee amputation status, left  2012- pt is wearing prostesis      History of right cataract extraction      History of left cataract extraction      S/P arteriovenous (AV) fistula creation  right arm brachiocephalic arteriovenous fistula on 11/08/2018      H/O hematuria  s/p bladder bx and fulguration 2/25/2020      H/O transurethral destruction of bladder lesion  2020      History of excision of mass  back mass on 03/31/2021        fish (Rash)  liver (Anaphylaxis)  No Known Drug Allergies    Home Medications Reviewed  Hospital Medications:   MEDICATIONS  (STANDING):    SOCIAL HISTORY:  Denies ETOh,Smoking,   FAMILY HISTORY:  Family history of cirrhosis of liver (Mother)    Family history of renal failure (Sibling)    Family history of hypertension (Sibling)    Family history of diabetes mellitus (Sibling)    History of substance abuse in sibling (Sibling)          VITALS:  T(F): 98.2 (03-15-23 @ 11:08), Max: 98.8 (03-15-23 @ 00:35)  HR: 99 (03-15-23 @ 11:08)  BP: 184/94 (03-15-23 @ 11:08)  RR: 18 (03-15-23 @ 11:08)  SpO2: 99% (03-15-23 @ 11:08)  Wt(kg): --    Height (cm): 167.6 (03-15 @ 11:08), 167.6 (03-14 @ 22:03)  Weight (kg): 53.1 (03-15 @ 11:08), 54.4 (03-14 @ 22:03)  BMI (kg/m2): 18.9 (03-15 @ 11:08), 19.4 (03-14 @ 22:03)  BSA (m2): 1.59 (03-15 @ 11:08), 1.61 (03-14 @ 22:03)  PHYSICAL EXAM:  Constitutional: NAD  HEENT: anicteric sclera, oropharynx clear.  Neck: No JVD  Respiratory: CTAB, no wheezes, rales or rhonchi  Cardiovascular: S1, S2, RRR  Gastrointestinal: BS+, soft, NT/ND  Extremities:  No peripheral edema  Neurological: A/O x 3, no focal deficits  : No CVA tenderness. No [place].   Skin: No rashes  Vascular Access: RUEAV ACCESS WITH THRILL AND BRUIT    LABS:  03-15    136  |  95<L>  |  28<H>  ----------------------------<  89  4.2   |  32<H>  |  8.05<H>    Ca    9.0      15 Mar 2023 03:00    TPro  6.7  /  Alb  3.2<L>  /  TBili  0.6  /  DBili      /  AST  18  /  ALT  14  /  AlkPhos  61  03-15    Creatinine Trend: 8.05 <--                        8.9    3.90  )-----------( 127      ( 15 Mar 2023 03:00 )             28.5     Urine Studies:      RADIOLOGY & ADDITIONAL STUDIES:

## 2023-03-21 NOTE — DISCHARGE NOTE PROVIDER - PROVIDER TOKENS
PROVIDER:[TOKEN:[[id number]],FOLLOWUP:[1 week]],PROVIDER:[TOKEN:[[id number]],FOLLOWUP:[2 weeks]]

## 2023-03-21 NOTE — PROGRESS NOTE ADULT - SUBJECTIVE AND OBJECTIVE BOX
C A R D I O L O G Y  **********************************     DATE OF SERVICE: 03-21-23    Patient denies chest pain or shortness of breath.   Review of symptoms otherwise negative.    acetaminophen     Tablet .. 650 milliGRAM(s) Oral every 6 hours PRN  albuterol/ipratropium for Nebulization 3 milliLiter(s) Nebulizer every 6 hours  ALPRAZolam 0.25 milliGRAM(s) Oral at bedtime  amLODIPine   Tablet 10 milliGRAM(s) Oral daily  budesonide 160 MICROgram(s)/formoterol 4.5 MICROgram(s) Inhaler 2 Puff(s) Inhalation two times a day  calcium carbonate    500 mG (Tums) Chewable 1 Tablet(s) Chew two times a day  chlorhexidine 2% Cloths 1 Application(s) Topical daily  epoetin beverley-epbx (RETACRIT) Injectable 07176 Unit(s) IV Push <User Schedule>  ferrous    sulfate 325 milliGRAM(s) Oral daily  furosemide    Tablet 80 milliGRAM(s) Oral daily  heparin   Injectable 5000 Unit(s) SubCutaneous every 8 hours  hydrALAZINE 25 milliGRAM(s) Oral three times a day  insulin glargine Injectable (LANTUS) 4 Unit(s) SubCutaneous at bedtime  insulin lispro (ADMELOG) corrective regimen sliding scale   SubCutaneous three times a day before meals  insulin lispro (ADMELOG) corrective regimen sliding scale   SubCutaneous at bedtime  levothyroxine 25 MICROGram(s) Oral daily  metoclopramide 10 milliGRAM(s) Oral three times a day  metolazone 10 milliGRAM(s) Oral daily  metoprolol succinate ER 25 milliGRAM(s) Oral daily  oxyCODONE    IR 5 milliGRAM(s) Oral every 8 hours PRN  pantoprazole    Tablet 40 milliGRAM(s) Oral two times a day  sevelamer carbonate 800 milliGRAM(s) Oral three times a day with meals  simvastatin 40 milliGRAM(s) Oral at bedtime  sucralfate 1 Gram(s) Oral four times a day  zolpidem 5 milliGRAM(s) Oral at bedtime PRN                            8.3    4.69  )-----------( 108      ( 21 Mar 2023 10:45 )             27.0       Hemoglobin: 8.3 g/dL (03-21 @ 10:45)  Hemoglobin: 8.6 g/dL (03-19 @ 06:40)  Hemoglobin: 9.4 g/dL (03-18 @ 18:20)  Hemoglobin: 8.5 g/dL (03-18 @ 07:30)  Hemoglobin: 9.3 g/dL (03-17 @ 08:15)        Mg     2.7     03-21      Creatinine Trend: 7.87<--, 6.65<--, 10.40<--, 8.21<--, 11.00<--, 8.05<--    COAGS:     CARDIAC MARKERS ( 18 Mar 2023 18:20 )  x     / x     / x     / x     / 6.1 ng/mL        T(C): 36.6 (03-21-23 @ 05:26), Max: 37.1 (03-20-23 @ 20:38)  HR: 90 (03-21-23 @ 05:26) (80 - 94)  BP: 140/75 (03-21-23 @ 05:26) (140/75 - 159/84)  RR: 18 (03-21-23 @ 05:26) (18 - 18)  SpO2: 100% (03-21-23 @ 05:26) (97% - 100%)  Wt(kg): --    I&O's Summary      HEENT:  (-)icterus (-)pallor  CV: N S1 S2 1/6 DIANA (+)2 Pulses B/l  Resp:  Clear to ausculatation B/L, normal effort  GI: (+) BS Soft, NT, ND  Lymph:  (-)Edema, (-)obvious lymphadenopathy  Skin: Warm to touch, Normal turgor  Psych: Appropriate mood and affect      TELEMETRY: 	off        ASSESSMENT/PLAN: 	62y  Male  PMH of ESRD on dialysis (Tues, Thurs, Sat), DM2, CAD s/p MI, normal LV and RV function no ischemia on stress 4/22 , glaucoma, legally blind, HTN, orthostatic hypotension, hyperparathyroid, hyperkalemia, LS spinal stenosis, osteoarthritis, osteoporosis, PAD, hx of osteomyelitis of thoracic vertebrae, remote hx of crack-cocaine use disorder, PSH of left BKA presents after being found hypoxic at the nursing home.    # NSTEMI  - Mild elevation in the setting of hypoxia and ESRD likely multifactorial.  Hypoxia, elevated cardiac filling pressures.  He has no Chest pain and no injury or ischemia on EKG presentation is inconsistent with ACS  - echo 2/24 EF 45%  - Stress test less than 1 year ago revealed normal perfusion   - No need for further inpatient cardiac work up.      # SOB  - pulm eval appreciated   - no clinical CHF  - would not cooperate with VQ    # ESRD   - HD per renal      Dominic Still MD, [facility]  BEEPER [phone]

## 2023-03-21 NOTE — DISCHARGE NOTE PROVIDER - CARE PROVIDER_API CALL
Dario De La Torre)  Medicine  [address], [address] [place] [address]  Phone: [phone]  Fax: [phone]  Follow Up Time: 1 week    Maldonado Molina)  Gastroenterology  [address] [facility], [address] [place] [address], [place] [address]  Phone: [phone]  Fax: [phone]  Follow Up Time: 2 weeks

## 2023-03-21 NOTE — PROGRESS NOTE ADULT - ASSESSMENT
62 year old, Male, w/ PMH of ESRD on dialysis (Tues, Thurs, Sat), DM2, CAD s/p MI, CHF, glaucoma, legally blind, HTN, orthostatic hypotension, hyperparathyroid, hyperkalemia, LS spinal stenosis, osteoarthritis, osteoporosis, PAD, hx of osteomyelitis of thoracic vertebrae, remote hx of crack-cocaine use disorder, PSH of BKA of L leg presents after being found hypoxic at the nursing home. Patient will be admitted for AHRF 2/2 COPD exacerbation and chronic pleural effusions 2/2 advance renal failure. 62 year old, Male, w/ PMH of ESRD on dialysis (Tues, Thurs, Sat), DM2, CAD s/p MI, CHF, glaucoma, legally blind, HTN, orthostatic hypotension, hyperparathyroid, hyperkalemia, LS spinal stenosis, osteoarthritis, osteoporosis, PAD, hx of osteomyelitis of thoracic vertebrae, remote hx of crack-cocaine use disorder, PSH of BKA of L leg presents after being found hypoxic at the nursing home. Patient will be admitted for AHRF 2/2 COPD exacerbation and chronic pleural effusions 2/2 advanced renal failure.

## 2023-03-22 NOTE — PROGRESS NOTE ADULT - ATTENDING COMMENTS
CM TEAM, [facility] STAFF - S/W PATIENT REGARDING D/C TO [facility] - PATIENT IS AGREEABLE    # RAPID RESPONSE S/T EPISODE OF DYSPNEA [3/18]  - RESOLVED WITH DUONEB ADMINISTRATION  - NOTED EKG, F/U CXR  - F/U TROPONINS  - EVALUATED BY RAPID RESPONSE TEAM  - [3/20] - EPISODE OF WHEEZING IMPROVED BY DUONEB    # ACUTE ON CHRONIC HYPOXIC RESPIRATORY FAILURE S/T ? PULMONARY EDEMA + HX OF COPD  # UNCONTROLLED HTN  # ESRD ON HD TTS - W/ HX OF NONCOMPLIANCE    - HYDRALAZINE, METOPROLOL AND NORVASC  - PLACED ON DECADRON AND LEVAQUIN RECENTLY  - SUPPLEMENTAL O2  - NEPHROLOGY CONSULT  - PULMONOLOGY CONSULT  - CARDIOLOGY CONSULT    - PATIENT WILL NEED HOME O2, CM TEAM TO HELP COORDINATION; IS 87% ON ROOM AIR AT REST    - RECOMMENDED CTANGIO CHEST BY PULMONOLOGY TEAM - PATIENT DID NOT TOLERATE. PATIENT VERBALIZED UNDERSTANDING OF RISKS OF DEFERING THIS TEST WHICH INCLUDE BUT ARE NOT LIMITED TO MISSED DIAGNOSIS, WORSENING CLINICAL CONDITION AND DEATH.    - PATIENT STARTED V/Q SCAN AND COULD NOT TOLERATE / FOLLOW THE RECOMMENDED BREATHING INSTRUCTIONS. HE REFUSED TEST.      # ELEVATED TROPONINS - IN THE SETTING OF RENAL FAILURE, ? DEMAND ISCHEMIA  - TELEMETRY  - TRENDED TROPONINS  -  ECHO 2/24/23 - TRACE AR, CONCENTRIC LVH, G1DD  - CARDIOLOGY CONSULT    # EPISODE OF SMALL VOLUME EMESIS W/ STREAKING OF HEMATEMESIS  # RECURRENT INTRACTABLE NAUSEA/VOMITING  # HISTORY OF ESOPHAGITIS AND DUODENITIS [1/2021], HX OF GASTROPARESIS W/ EVIDENCE OF THICKENED ESOPHAGUS ON PREVIOUS CT SCAN   - EGD AT [facility] - DEMONSTRATED RETAINED FOOD PRODUCT IN STOMACH, RECOMMENDED FOR GASTRIC EMPTYING STUDY  - DENIES RECENT HEMATEMESIS   - MONITORING HGB, PLACED ON PPI BID  - CARAFATE, PRN ANTIEMETICS  - MONITORING FOR SYMPTOMS  - TOLERATING DIET ; PATIENT REPEATEDLY COUNSELLED TO CHEW FOOD CAUTIOUSLY AND CONSUME SMALL BITES W/ REGULAR CLEARING WITH WATER BETWEEN BITES.      - PATIENT RECENTLY STARTED EVALUATION TO R/O PANCREATIC HEAD MASS, UNDERWENT MRCP, PLANNED FOR OUTPATIENT F/U    - GI CONSULT    # HYPOGLYCEMIC EPISODE, LABILE BLOOD GLUCOSE  # SUSPECTED GASTROPARESIS  # UNDERLYING DM  - SSI + FS  - ENDOCRINOLOGY CONSULT    # ? HEMOCHROMATOSIS  - NOTED FERRITIN, F/U IRON AND TIBC, TRANSFERRIN SAT  - WILL REFER HEPATOLOGY AS OUTPATIENT PENDING ABOVE WORKUP    # PATIENT UNDERGOING OUTPATIENT WORKUP FOR CENTRAL VENOUS STENOSIS THROUGH NEPHROLOGY TEAM    # ANEMIA OF CKD  # HX OF PANCYTOPENIA   - TREND HGB, TRANSFUSION THRESHOLD HGB < 7  - TYPE AND SCREEN  - ON EPO  - DENIES RECENT HEMATEMESIS, MELENA, HEMATOCHEZIA  - HEME/ONC CONSULT    # SEVERE PROTEIN CALORIE MALNUTRITION, FAILURE TO THRIVE - NUTRITIONAL SUPPLEMENT    # HLD  # PARTIALLY BLIND  # S/P LEFT BKA  # HX OF PVD  # LS SPINAL STENOSIS  # GI AND DVT PPX.

## 2023-03-22 NOTE — PROGRESS NOTE ADULT - SUBJECTIVE AND OBJECTIVE BOX
[facility] : Progress Note :: [phone], (office [phone]),   Dr Mosher / Dr Washington / Dr Young / Dr Doll / Dr Varsha TURCIOS / Dr Tello / Dr Garcia / Dr Larry qiu  _____________________________________________________________________________________________        fish (Rash)  liver (Anaphylaxis)  No Known Drug Allergies    Hospital Medications:   MEDICATIONS  (STANDING):  albuterol/ipratropium for Nebulization 3 milliLiter(s) Nebulizer every 6 hours  ALPRAZolam 0.25 milliGRAM(s) Oral at bedtime  amLODIPine   Tablet 10 milliGRAM(s) Oral daily  budesonide 160 MICROgram(s)/formoterol 4.5 MICROgram(s) Inhaler 2 Puff(s) Inhalation two times a day  calcium carbonate    500 mG (Tums) Chewable 1 Tablet(s) Chew two times a day  chlorhexidine 2% Cloths 1 Application(s) Topical daily  epoetin beverley-epbx (RETACRIT) Injectable 45002 Unit(s) IV Push <User Schedule>  ferrous    sulfate 325 milliGRAM(s) Oral daily  furosemide    Tablet 80 milliGRAM(s) Oral daily  heparin   Injectable 5000 Unit(s) SubCutaneous every 8 hours  hydrALAZINE 25 milliGRAM(s) Oral three times a day  insulin glargine Injectable (LANTUS) 4 Unit(s) SubCutaneous at bedtime  insulin lispro (ADMELOG) corrective regimen sliding scale   SubCutaneous three times a day before meals  insulin lispro (ADMELOG) corrective regimen sliding scale   SubCutaneous at bedtime  levothyroxine 25 MICROGram(s) Oral daily  metoclopramide 10 milliGRAM(s) Oral three times a day  metolazone 10 milliGRAM(s) Oral daily  metoprolol succinate ER 25 milliGRAM(s) Oral daily  pantoprazole    Tablet 40 milliGRAM(s) Oral two times a day  simvastatin 40 milliGRAM(s) Oral at bedtime  sucralfate 1 Gram(s) Oral four times a day        VITALS:  T(F): 98.1 (03-22-23 @ 06:38), Max: 99.1 (03-21-23 @ 13:25)  HR: 97 (03-22-23 @ 06:38)  BP: 145/72 (03-22-23 @ 06:38)  RR: 17 (03-22-23 @ 06:38)  SpO2: 93% (03-22-23 @ 06:38)  Wt(kg): --    03-21 @ 07:01  -  03-22 @ 07:00  --------------------------------------------------------  IN: 600 mL / OUT: 1859 mL / NET: -1259 mL        PHYSICAL EXAM:  Constitutional: NAD  HEENT: anicteric sclera, oropharynx clear.  Neck: No JVD  Respiratory: CTAB, no wheezes, rales or rhonchi  Cardiovascular: S1, S2, RRR  Gastrointestinal: BS+, soft, NT/ND  Extremities: No peripheral edema  Neurological: A/O x 3, no focal deficits  Vascular Access: AVF with thrill and bruit    LABS:  03-22    134<L>  |  98  |  62<H>  ----------------------------<  119<H>  5.2   |  27  |  8.76<H>    Ca    8.5      22 Mar 2023 07:40  Phos  2.4     03-22  Mg     2.6     03-22      Creatinine Trend: 8.76 <--, 11.50 <--, 7.87 <--, 6.65 <--, 10.40 <--, 8.21 <--, 11.00 <--                        8.0    2.72  )-----------( x        ( 22 Mar 2023 07:40 )             25.3     Urine Studies:      RADIOLOGY & ADDITIONAL STUDIES:

## 2023-03-22 NOTE — DIETITIAN INITIAL EVALUATION ADULT - PERTINENT MEDS FT
MEDICATIONS  (STANDING):  albuterol/ipratropium for Nebulization 3 milliLiter(s) Nebulizer every 6 hours  ALPRAZolam 0.25 milliGRAM(s) Oral at bedtime  amLODIPine   Tablet 10 milliGRAM(s) Oral daily  budesonide 160 MICROgram(s)/formoterol 4.5 MICROgram(s) Inhaler 2 Puff(s) Inhalation two times a day  calcium carbonate    500 mG (Tums) Chewable 1 Tablet(s) Chew two times a day  chlorhexidine 2% Cloths 1 Application(s) Topical daily  epoetin beverley-epbx (RETACRIT) Injectable 83722 Unit(s) IV Push <User Schedule>  ferrous    sulfate 325 milliGRAM(s) Oral daily  furosemide    Tablet 80 milliGRAM(s) Oral daily  heparin   Injectable 5000 Unit(s) SubCutaneous every 8 hours  hydrALAZINE 25 milliGRAM(s) Oral three times a day  insulin glargine Injectable (LANTUS) 4 Unit(s) SubCutaneous at bedtime  insulin lispro (ADMELOG) corrective regimen sliding scale   SubCutaneous at bedtime  insulin lispro (ADMELOG) corrective regimen sliding scale   SubCutaneous three times a day before meals  levothyroxine 25 MICROGram(s) Oral daily  metoclopramide 10 milliGRAM(s) Oral three times a day  metolazone 10 milliGRAM(s) Oral daily  metoprolol succinate ER 25 milliGRAM(s) Oral daily  pantoprazole    Tablet 40 milliGRAM(s) Oral two times a day  simvastatin 40 milliGRAM(s) Oral at bedtime  sucralfate 1 Gram(s) Oral four times a day    MEDICATIONS  (PRN):  acetaminophen     Tablet .. 650 milliGRAM(s) Oral every 6 hours PRN Temp greater or equal to 38C (100.4F), Mild Pain (1 - 3)  oxyCODONE    IR 5 milliGRAM(s) Oral every 8 hours PRN Severe Pain (7 - 10)

## 2023-03-22 NOTE — PROGRESS NOTE ADULT - ASSESSMENT
Patient is a 62y Male whom presented to the hospital with hypoxia.  He has ESRD on HD TTS at [facility], last dialysed yesterday with UF of 1.6 kilos. Presented to ED yesterday with vomitting and generalized weakness.  upon discharge, was noted to be hypoxic sats 88% on RA, thus re-sent to ED. sats was 99% on oxygen. a CXR did not reveal any pulmonary edema.  at time of exam, he denies any SOB and is talking in complete sentences off oxygen. had an episode of vomitting in ED.  renal consulted for ESRD.    # ESRD.  s/p HD yesterday  cont lasix.  # hypoxia. per pulmo recs- awaits oxygen delivery at assisted living facility  # anemia of CKD. epogen on HD   # CKDMBD  PHOS ELEVATED.  SEVELAMER   D/C planning

## 2023-03-22 NOTE — CHART NOTE - NSCHARTNOTEFT_GEN_A_CORE
Notified by CT technician that they injected some IV contrast and patient started to jump off the table and scream. He states it "burned like crazy and was too painful" so he declined to complete the test. Will cancel test for now. Attending notified.
Notified by RN that potassium was elevated. Lokelma 10 BID given x 2 doses
Pat is dressed sitting in chair . Refused to be examine by me . he stated " I am fine and want to call my brother "  No sign of distress

## 2023-03-22 NOTE — DIETITIAN INITIAL EVALUATION ADULT - OTHER INFO
Pt Visited. Pt is  Providing with limited information. Pt seems not co-operative. Pt is On dialysis. Pt states dislikes Eggs. Offered Nepro or Glucerna shake but Pt refused. Offered sandwiches agreed to try.  F & N dept Notified. Pt with [facility]. Pt does not recall  his Ht.  Estimated Ht 66 inches, wt from flow sheet 118 lb. LAbs noted. nephro notes noted. Pt denies Swallowing or chewing difficulty.  Labs noted. Phos 2.4, Pt is on TUMS.

## 2023-03-22 NOTE — DIETITIAN INITIAL EVALUATION ADULT - PERTINENT LABORATORY DATA
03-22    134<L>  |  98  |  62<H>  ----------------------------<  119<H>  5.2   |  27  |  8.76<H>    Ca    8.5      22 Mar 2023 07:40  Phos  2.4     03-22  Mg     2.6     03-22    POCT Blood Glucose.: 136 mg/dL (03-22-23 @ 07:52)  A1C with Estimated Average Glucose Result: 5.7 % (03-16-23 @ 10:30)  A1C with Estimated Average Glucose Result: 5.0 % (01-10-23 @ 05:35)  A1C with Estimated Average Glucose Result: 5.2 % (10-04-22 @ 11:35)

## 2023-03-22 NOTE — PROGRESS NOTE ADULT - PROBLEM SELECTOR PLAN 3
ESRD on HD TTS at [facility], last dialysed yesterday with UF of 1.6 kilos  s/p HD today next HD Thursday  Dr. Mosher following ESRD on HD TTS at [facility], last dialysed yesterday with UF of 1.6 kilos  Next HD tomorrow  Dr. Mosher following

## 2023-03-22 NOTE — PROGRESS NOTE ADULT - SUBJECTIVE AND OBJECTIVE BOX
PGY-1 Progress Note discussed with attending    PAGER #: [[phone]] TILL 5:00 PM  PLEASE CONTACT ON CALL TEAM:   - On Call Team (Please refer to Dejon) FROM 5:00 PM - 8:30PM  - Nightfloat Team FROM 8:30 -7:30 AM    INTERVAL HPI/OVERNIGHT EVENTS:       REVIEW OF SYSTEMS:  CONSTITUTIONAL: No fever, weight loss, or fatigue  RESPIRATORY: No cough, wheezing, chills or hemoptysis; No shortness of breath  CARDIOVASCULAR: No chest pain, palpitations, dizziness, or leg swelling  GASTROINTESTINAL: No abdominal pain. No nausea, vomiting, or hematemesis; No diarrhea or constipation. No melena or hematochezia.  GENITOURINARY: No dysuria or hematuria, urinary frequency  NEUROLOGICAL: No headaches, memory loss, loss of strength, numbness, or tremors  SKIN: No itching, burning, rashes, or lesions     MEDICATIONS  (STANDING):  albuterol/ipratropium for Nebulization 3 milliLiter(s) Nebulizer every 6 hours  ALPRAZolam 0.25 milliGRAM(s) Oral at bedtime  amLODIPine   Tablet 10 milliGRAM(s) Oral daily  budesonide 160 MICROgram(s)/formoterol 4.5 MICROgram(s) Inhaler 2 Puff(s) Inhalation two times a day  calcium carbonate    500 mG (Tums) Chewable 1 Tablet(s) Chew two times a day  chlorhexidine 2% Cloths 1 Application(s) Topical daily  epoetin beverley-epbx (RETACRIT) Injectable 34108 Unit(s) IV Push <User Schedule>  ferrous    sulfate 325 milliGRAM(s) Oral daily  furosemide    Tablet 80 milliGRAM(s) Oral daily  heparin   Injectable 5000 Unit(s) SubCutaneous every 8 hours  hydrALAZINE 25 milliGRAM(s) Oral three times a day  insulin glargine Injectable (LANTUS) 4 Unit(s) SubCutaneous at bedtime  insulin lispro (ADMELOG) corrective regimen sliding scale   SubCutaneous at bedtime  insulin lispro (ADMELOG) corrective regimen sliding scale   SubCutaneous three times a day before meals  levothyroxine 25 MICROGram(s) Oral daily  metoclopramide 10 milliGRAM(s) Oral three times a day  metolazone 10 milliGRAM(s) Oral daily  metoprolol succinate ER 25 milliGRAM(s) Oral daily  pantoprazole    Tablet 40 milliGRAM(s) Oral two times a day  simvastatin 40 milliGRAM(s) Oral at bedtime  sucralfate 1 Gram(s) Oral four times a day    MEDICATIONS  (PRN):  acetaminophen     Tablet .. 650 milliGRAM(s) Oral every 6 hours PRN Temp greater or equal to 38C (100.4F), Mild Pain (1 - 3)  oxyCODONE    IR 5 milliGRAM(s) Oral every 8 hours PRN Severe Pain (7 - 10)      Vital Signs Last 24 Hrs  T(C): 37 (22 Mar 2023 13:35), Max: 37.1 (21 Mar 2023 17:23)  T(F): 98.6 (22 Mar 2023 13:35), Max: 98.7 (21 Mar 2023 17:23)  HR: 94 (22 Mar 2023 13:35) (94 - 103)  BP: 151/85 (22 Mar 2023 13:35) (145/72 - 178/95)  BP(mean): --  RR: 18 (22 Mar 2023 13:35) (17 - 24)  SpO2: 92% (22 Mar 2023 13:35) (92% - 100%)    Parameters below as of 22 Mar 2023 13:35  Patient On (Oxygen Delivery Method): room air        PHYSICAL EXAMINATION:  GENERAL: NAD, well built  HEAD:  Atraumatic, Normocephalic  EYES:  conjunctiva and sclera clear  NECK: Supple, No JVD, Normal thyroid  CHEST/LUNG: Clear to auscultation. Clear to percussion bilaterally; No rales, rhonchi, wheezing, or rubs  HEART: Regular rate and rhythm; No murmurs, rubs, or gallops  ABDOMEN: Soft, Nontender, Nondistended; Bowel sounds present  NERVOUS SYSTEM:  Alert & Oriented X3,    EXTREMITIES:  2+ Peripheral Pulses, No clubbing, cyanosis, or edema  SKIN: warm dry                          8.0    2.72  )-----------( 92       ( 22 Mar 2023 07:40 )             25.3     03-22    134<L>  |  98  |  62<H>  ----------------------------<  119<H>  5.2   |  27  |  8.76<H>    Ca    8.5      22 Mar 2023 07:40  Phos  2.4     03-22  Mg     2.6     03-22                    I&O's Summary    21 Mar 2023 07:01  -  22 Mar 2023 07:00  --------------------------------------------------------  IN: 600 mL / OUT: 1859 mL / NET: -1259 mL        CAPILLARY BLOOD GLUCOSE      POCT Blood Glucose.: 230 mg/dL (22 Mar 2023 11:16)    CAPILLARY BLOOD GLUCOSE      POCT Blood Glucose.: 230 mg/dL (22 Mar 2023 11:16)  POCT Blood Glucose.: 136 mg/dL (22 Mar 2023 07:52)  POCT Blood Glucose.: 185 mg/dL (21 Mar 2023 21:56)  POCT Blood Glucose.: 178 mg/dL (21 Mar 2023 16:47)  POCT Blood Glucose.: 183 mg/dL (21 Mar 2023 14:12)      RADIOLOGY & ADDITIONAL TESTS:                   PGY-1 Progress Note discussed with attending    PAGER #: [[phone]] TILL 5:00 PM  PLEASE CONTACT ON CALL TEAM:   - On Call Team (Please refer to Dejon) FROM 5:00 PM - 8:30PM  - Nightfloat Team FROM 8:30 -7:30 AM    INTERVAL HPI/OVERNIGHT EVENTS:   No acute overnight events. Pt has no acute complaints, he is worried about going to the nursing home next door because his girlfriend is at the current facility.    REVIEW OF SYSTEMS:  CONSTITUTIONAL: No fever, weight loss, or fatigue  RESPIRATORY: No cough, wheezing, chills or hemoptysis; No shortness of breath  CARDIOVASCULAR: No chest pain, palpitations, dizziness, or leg swelling  GASTROINTESTINAL: No abdominal pain. No nausea, vomiting, or hematemesis; No diarrhea or constipation. No melena or hematochezia.  GENITOURINARY: No dysuria or hematuria, urinary frequency  NEUROLOGICAL: No headaches, memory loss, loss of strength, numbness, or tremors  SKIN: No itching, burning, rashes, or lesions     MEDICATIONS  (STANDING):  albuterol/ipratropium for Nebulization 3 milliLiter(s) Nebulizer every 6 hours  ALPRAZolam 0.25 milliGRAM(s) Oral at bedtime  amLODIPine   Tablet 10 milliGRAM(s) Oral daily  budesonide 160 MICROgram(s)/formoterol 4.5 MICROgram(s) Inhaler 2 Puff(s) Inhalation two times a day  calcium carbonate    500 mG (Tums) Chewable 1 Tablet(s) Chew two times a day  chlorhexidine 2% Cloths 1 Application(s) Topical daily  epoetin beverley-epbx (RETACRIT) Injectable 38956 Unit(s) IV Push <User Schedule>  ferrous    sulfate 325 milliGRAM(s) Oral daily  furosemide    Tablet 80 milliGRAM(s) Oral daily  heparin   Injectable 5000 Unit(s) SubCutaneous every 8 hours  hydrALAZINE 25 milliGRAM(s) Oral three times a day  insulin glargine Injectable (LANTUS) 4 Unit(s) SubCutaneous at bedtime  insulin lispro (ADMELOG) corrective regimen sliding scale   SubCutaneous at bedtime  insulin lispro (ADMELOG) corrective regimen sliding scale   SubCutaneous three times a day before meals  levothyroxine 25 MICROGram(s) Oral daily  metoclopramide 10 milliGRAM(s) Oral three times a day  metolazone 10 milliGRAM(s) Oral daily  metoprolol succinate ER 25 milliGRAM(s) Oral daily  pantoprazole    Tablet 40 milliGRAM(s) Oral two times a day  simvastatin 40 milliGRAM(s) Oral at bedtime  sucralfate 1 Gram(s) Oral four times a day    MEDICATIONS  (PRN):  acetaminophen     Tablet .. 650 milliGRAM(s) Oral every 6 hours PRN Temp greater or equal to 38C (100.4F), Mild Pain (1 - 3)  oxyCODONE    IR 5 milliGRAM(s) Oral every 8 hours PRN Severe Pain (7 - 10)      Vital Signs Last 24 Hrs  T(C): 37 (22 Mar 2023 13:35), Max: 37.1 (21 Mar 2023 17:23)  T(F): 98.6 (22 Mar 2023 13:35), Max: 98.7 (21 Mar 2023 17:23)  HR: 94 (22 Mar 2023 13:35) (94 - 103)  BP: 151/85 (22 Mar 2023 13:35) (145/72 - 178/95)  BP(mean): --  RR: 18 (22 Mar 2023 13:35) (17 - 24)  SpO2: 92% (22 Mar 2023 13:35) (92% - 100%)    Parameters below as of 22 Mar 2023 13:35  Patient On (Oxygen Delivery Method): room air        PHYSICAL EXAMINATION:  GENERAL: NAD, legally blind male  HEAD:  Atraumatic, Normocephalic  EYES:  conjunctiva and sclera clear  NECK: Supple, No JVD, Normal thyroid  CHEST/LUNG: + diffuse wheezing, improved after duoneb. mild bibasilar crackles. No rales, rhonchi, or rubs  HEART: Regular rate and rhythm; No murmurs, rubs, or gallops  ABDOMEN: Soft, Nontender, Nondistended; Bowel sounds present  NERVOUS SYSTEM:  Alert & Oriented X3  EXTREMITIES:  2+ Peripheral Pulses, No clubbing, cyanosis, or edema. + LLE BKA, +RUE AV fistula  SKIN: warm dry                          8.0    2.72  )-----------( 92       ( 22 Mar 2023 07:40 )             25.3     03-22    134<L>  |  98  |  62<H>  ----------------------------<  119<H>  5.2   |  27  |  8.76<H>    Ca    8.5      22 Mar 2023 07:40  Phos  2.4     03-22  Mg     2.6     03-22                    I&O's Summary    21 Mar 2023 07:01  -  22 Mar 2023 07:00  --------------------------------------------------------  IN: 600 mL / OUT: 1859 mL / NET: -1259 mL        CAPILLARY BLOOD GLUCOSE      POCT Blood Glucose.: 230 mg/dL (22 Mar 2023 11:16)    CAPILLARY BLOOD GLUCOSE      POCT Blood Glucose.: 230 mg/dL (22 Mar 2023 11:16)  POCT Blood Glucose.: 136 mg/dL (22 Mar 2023 07:52)  POCT Blood Glucose.: 185 mg/dL (21 Mar 2023 21:56)  POCT Blood Glucose.: 178 mg/dL (21 Mar 2023 16:47)  POCT Blood Glucose.: 183 mg/dL (21 Mar 2023 14:12)

## 2023-03-22 NOTE — PROGRESS NOTE ADULT - ASSESSMENT
62 year old, Male, w/ PMH of ESRD on dialysis (Tues, Thurs, Sat), DM2, CAD s/p MI, CHF, glaucoma, legally blind, HTN, orthostatic hypotension, hyperparathyroid, hyperkalemia, LS spinal stenosis, osteoarthritis, osteoporosis, PAD, hx of osteomyelitis of thoracic vertebrae, remote hx of crack-cocaine use disorder, PSH of BKA of L leg presents after being found hypoxic at the nursing home. Patient will be admitted for AHRF 2/2 COPD exacerbation and chronic pleural effusions 2/2 advanced renal failure.

## 2023-03-22 NOTE — PROGRESS NOTE ADULT - PROBLEM SELECTOR PLAN 1
hx of COPD and chronic pleural effusion 2/2 renal failure   p/w hypoxia at nursing home  Afebrile, /94, HR 99, 99% on 4L NC  Trop 516> 444> 176> 118  CXR clear   EKG no new changes  likely 2/2 pulmonary edema and pleural effusion 2/2 renal failure  Pt could not tolerate CTA due to burning contrast. Could not tolerate V/Q Scan due to unable to follow breathing instructions.  c/w duonebs  Pending Home O2 auth by Mateus Murray hx of COPD and chronic pleural effusion 2/2 renal failure   p/w hypoxia at nursing home  Afebrile, /94, HR 99, 99% on 4L NC  Trop 516> 444> 176> 118  CXR clear   EKG no new changes  likely 2/2 pulmonary edema and pleural effusion 2/2 renal failure  Pt could not tolerate CTA due to burning contrast. Could not tolerate V/Q Scan due to unable to follow breathing instructions.  c/w duonebs  Pending transport to [facility]

## 2023-03-22 NOTE — PROGRESS NOTE ADULT - SUBJECTIVE AND OBJECTIVE BOX
C A R D I O L O G Y  **********************************     DATE OF SERVICE: 03-22-23    Patient denies chest pain or shortness of breath.   Review of symptoms otherwise negative.    acetaminophen     Tablet .. 650 milliGRAM(s) Oral every 6 hours PRN  albuterol/ipratropium for Nebulization 3 milliLiter(s) Nebulizer every 6 hours  ALPRAZolam 0.25 milliGRAM(s) Oral at bedtime  amLODIPine   Tablet 10 milliGRAM(s) Oral daily  budesonide 160 MICROgram(s)/formoterol 4.5 MICROgram(s) Inhaler 2 Puff(s) Inhalation two times a day  calcium carbonate    500 mG (Tums) Chewable 1 Tablet(s) Chew two times a day  chlorhexidine 2% Cloths 1 Application(s) Topical daily  epoetin beverley-epbx (RETACRIT) Injectable 54072 Unit(s) IV Push <User Schedule>  ferrous    sulfate 325 milliGRAM(s) Oral daily  furosemide    Tablet 80 milliGRAM(s) Oral daily  heparin   Injectable 5000 Unit(s) SubCutaneous every 8 hours  hydrALAZINE 25 milliGRAM(s) Oral three times a day  insulin glargine Injectable (LANTUS) 4 Unit(s) SubCutaneous at bedtime  insulin lispro (ADMELOG) corrective regimen sliding scale   SubCutaneous at bedtime  insulin lispro (ADMELOG) corrective regimen sliding scale   SubCutaneous three times a day before meals  levothyroxine 25 MICROGram(s) Oral daily  metoclopramide 10 milliGRAM(s) Oral three times a day  metolazone 10 milliGRAM(s) Oral daily  metoprolol succinate ER 25 milliGRAM(s) Oral daily  oxyCODONE    IR 5 milliGRAM(s) Oral every 8 hours PRN  pantoprazole    Tablet 40 milliGRAM(s) Oral two times a day  simvastatin 40 milliGRAM(s) Oral at bedtime  sucralfate 1 Gram(s) Oral four times a day                            8.0    2.72  )-----------( 92       ( 22 Mar 2023 07:40 )             25.3       Hemoglobin: 8.0 g/dL (03-22 @ 07:40)  Hemoglobin: 8.3 g/dL (03-21 @ 10:45)  Hemoglobin: 8.6 g/dL (03-19 @ 06:40)  Hemoglobin: 9.4 g/dL (03-18 @ 18:20)  Hemoglobin: 8.5 g/dL (03-18 @ 07:30)      03-22    134<L>  |  98  |  62<H>  ----------------------------<  119<H>  5.2   |  27  |  8.76<H>    Ca    8.5      22 Mar 2023 07:40  Phos  2.4     03-22  Mg     2.6     03-22      Creatinine Trend: 8.76<--, 11.50<--, 7.87<--, 6.65<--, 10.40<--, 8.21<--    COAGS:           T(C): 36.7 (03-22-23 @ 06:38), Max: 37.3 (03-21-23 @ 13:25)  HR: 97 (03-22-23 @ 06:38) (97 - 103)  BP: 145/72 (03-22-23 @ 06:38) (145/72 - 178/95)  RR: 17 (03-22-23 @ 06:38) (16 - 24)  SpO2: 93% (03-22-23 @ 06:38) (92% - 100%)  Wt(kg): --    I&O's Summary    21 Mar 2023 07:01  -  22 Mar 2023 07:00  --------------------------------------------------------  IN: 600 mL / OUT: 1859 mL / NET: -1259 mL        HEENT:  (-)icterus (-)pallor  CV: N S1 S2 1/6 IDANA (+)2 Pulses B/l  Resp:  Clear to ausculatation B/L, normal effort  GI: (+) BS Soft, NT, ND  Lymph:  (-)Edema, (-)obvious lymphadenopathy  Skin: Warm to touch, Normal turgor  Psych: Appropriate mood and affect      TELEMETRY: 	off        ASSESSMENT/PLAN: 	62y  Male  PMH of ESRD on dialysis (Tues, Thurs, Sat), DM2, CAD s/p MI, normal LV and RV function no ischemia on stress 4/22 , glaucoma, legally blind, HTN, orthostatic hypotension, hyperparathyroid, hyperkalemia, LS spinal stenosis, osteoarthritis, osteoporosis, PAD, hx of osteomyelitis of thoracic vertebrae, remote hx of crack-cocaine use disorder, PSH of left BKA presents after being found hypoxic at the nursing home.    # NSTEMI  - Mild elevation in the setting of hypoxia and ESRD likely multifactorial.  Hypoxia, elevated cardiac filling pressures.  He has no Chest pain and no injury or ischemia on EKG presentation is inconsistent with ACS  - echo 2/24 EF 45%  - Stress test less than 1 year ago revealed normal perfusion   - No need for further inpatient cardiac work up.      # SOB  - pulm eval appreciated   - no clinical CHF  - would not cooperate with VQ    # ESRD   - HD per renal    - D/C planning in progress  - I will sign off please call back if needed      Dominic Still MD, [facility]  BEEPER [phone]

## 2023-03-23 NOTE — PROGRESS NOTE ADULT - PROBLEM SELECTOR PLAN 3
ESRD on HD TTS at [facility], last dialysed yesterday with UF of 1.6 kilos  Next HD tomorrow  Dr. Mosher following

## 2023-03-23 NOTE — PROGRESS NOTE ADULT - PROBLEM SELECTOR PROBLEM 4
Diabetes
Diabetes
[facility], Division of Infectious Diseases  [facility] [place] Y. Patel, S. Shah, [facility]  [phone]    Name: OVIDIO TOBIAS  Age: 73y  Gender: Male  MRN: [id number]    Interval History:  No acute overnight events. Afebrile  Notes reviewed    Antibiotics:      Medications:  acetaminophen     Tablet .. 650 milliGRAM(s) Oral every 6 hours PRN  amLODIPine   Tablet 10 milliGRAM(s) Oral daily  dextrose 5%. 1000 milliLiter(s) IV Continuous <Continuous>  dextrose 5%. 1000 milliLiter(s) IV Continuous <Continuous>  dextrose 50% Injectable 25 Gram(s) IV Push once  dextrose 50% Injectable 12.5 Gram(s) IV Push once  dextrose 50% Injectable 25 Gram(s) IV Push once  dextrose Oral Gel 15 Gram(s) Oral once PRN  enoxaparin Injectable 40 milliGRAM(s) SubCutaneous every 24 hours  folic acid 1 milliGRAM(s) Oral daily  glucagon  Injectable 1 milliGRAM(s) IntraMuscular once  guaiFENesin Oral Liquid (Sugar-Free) 100 milliGRAM(s) Oral every 6 hours PRN  hydrALAZINE 25 milliGRAM(s) Oral every 8 hours  LORazepam     Tablet   Oral   LORazepam     Tablet 2 milliGRAM(s) Oral every 2 hours PRN  LORazepam     Tablet 0.5 milliGRAM(s) Oral every 12 hours  LORazepam   Injectable 2 milliGRAM(s) IV Push every 1 hour PRN  multivitamin 1 Tablet(s) Oral daily  polyethylene glycol 3350 17 Gram(s) Oral daily PRN  senna 2 Tablet(s) Oral at bedtime  sodium chloride 0.9%. 1000 milliLiter(s) IV Continuous <Continuous>  thiamine IVPB 250 milliGRAM(s) IV Intermittent daily      Review of Systems:  Review of systems otherwise negative except as previously noted.    Allergies: No Known Allergies    For details regarding the patient's past medical history, social history, family history, and other miscellaneous elements, please refer the initial infectious diseases consultation and/or the admitting history and physical examination for this admission.    Objective:  Vitals:   T(C): 37.3 (04-05-22 @ 05:22), Max: 37.3 (04-05-22 @ 05:22)  HR: 92 (04-05-22 @ 05:22) (92 - 98)  BP: 145/76 (04-05-22 @ 05:22) (145/76 - 158/84)  RR: 18 (04-05-22 @ 05:22) (18 - 18)  SpO2: 96% (04-05-22 @ 05:22) (96% - 96%)    Physical Examination:  General: no acute distress  HEENT: NC/AT, EOMI, face appears flushed  Cardio: S1, S2 heard, RRR, no murmurs  Resp: breath sounds heard bilaterally  Abd: soft, NT, distended  Ext: no edema or cyanosis  Skin: warm, dry, no visible rash    Laboratory Studies:  CBC:                       17.2   3.95  )-----------( 88       ( 04 Apr 2022 07:23 )             48.1     CMP: 04-04    133<L>  |  100  |  13  ----------------------------<  104<H>  4.0   |  15<L>  |  0.80    Ca    8.7      04 Apr 2022 07:24  Phos  3.1     04-04  Mg     2.2     04-04            Microbiology: reviewed    Culture - Blood (collected 04-03-22 @ 16:18)  Source: .Blood Blood  Preliminary Report (04-04-22 @ 17:01):    No growth to date.    Culture - Blood (collected 04-03-22 @ 16:18)  Source: .Blood Blood  Preliminary Report (04-04-22 @ 17:01):    No growth to date.    Culture - Urine (collected 04-01-22 @ 22:04)  Source: Clean Catch Clean Catch (Midstream)  Final Report (04-02-22 @ 19:11):    <10,000 CFU/mL Normal Urogenital Diane    Culture - Blood (collected 04-01-22 @ 22:04)  Source: .Blood Blood  Gram Stain (04-02-22 @ 17:23):    Growth in anaerobic bottle: Gram Positive Cocci in Clusters    Growth in aerobic bottle: Gram Positive Cocci in Clusters  Final Report (04-03-22 @ 17:37):    Growth in aerobic and anaerobic bottles: Staphylococcus capitis    Growth in anaerobic bottle: Staphylococcus epidermidis    Coag Negative Staphylococcus    Single set isolate, possible contaminant. Contact    Microbiology if susceptibility testing clinically    indicated.    ***Blood Panel PCR results on this specimen are available    approximately 3 hours after the Gram stain result.***    Gram stain, PCR, and/or culture results may not always    correspond due to difference in methodologies.    ************************************************************    This PCR assay was performed by multiplex PCR. This    Assay tests for 66 bacterial and resistance gene targets.    Please refer to the [facility] test directory    at [email or web address] for details.  Organism: Blood Culture PCR (04-03-22 @ 17:37)  Organism: Blood Culture PCR (04-03-22 @ 17:37)      -  Staphylococcus epidermidis: Detec      Method Type: PCR    Culture - Blood (collected 04-01-22 @ 22:04)  Source: .Blood Blood  Preliminary Report (04-02-22 @ 23:01):    No growth to date.        Radiology: reviewed      
Diabetes

## 2023-03-23 NOTE — DISCHARGE NOTE NURSING/CASE MANAGEMENT/SOCIAL WORK - NSDCPEFALRISK_GEN_ALL_CORE
For information on Fall & Injury Prevention, visit: [email or web address] OR  [email or web address] OR  https://www.cdc.gov/steadi/patient.html

## 2023-03-23 NOTE — DISCHARGE NOTE NURSING/CASE MANAGEMENT/SOCIAL WORK - PATIENT PORTAL LINK FT
You can access the FollowMyHealth Patient Portal offered by [facility] by registering at the following website: [email or web address]. By joining [facility]’s FollowMyHealth portal, you will also be able to view your health information using other applications (apps) compatible with our system.

## 2023-03-23 NOTE — PROGRESS NOTE ADULT - PROBLEM SELECTOR PLAN 5
pt takes levothyroxine at home  c/w home meds

## 2023-03-23 NOTE — PROGRESS NOTE ADULT - PROBLEM SELECTOR PLAN 2
pt takes amlodipine, metolazone, lasix, and hydralazine  c/w home meds  DASh diet

## 2023-03-23 NOTE — PROGRESS NOTE ADULT - PROVIDER SPECIALTY LIST ADULT
Cardiology
Cardiology
Pulmonology
Cardiology
Internal Medicine
Nephrology
Pulmonology
Pulmonology
Nephrology
Internal Medicine

## 2023-03-23 NOTE — PROGRESS NOTE ADULT - PROBLEM SELECTOR PLAN 1
hx of COPD and chronic pleural effusion 2/2 renal failure   p/w hypoxia at nursing home  Afebrile, /94, HR 99, 99% on 4L NC  Trop 516> 444> 176> 118  CXR clear   EKG no new changes  likely 2/2 pulmonary edema and pleural effusion 2/2 renal failure  Pt could not tolerate CTA due to burning contrast. Could not tolerate V/Q Scan due to unable to follow breathing instructions.  c/w duonebs  Pending transport to [facility]

## 2023-03-23 NOTE — PROGRESS NOTE ADULT - SUBJECTIVE AND OBJECTIVE BOX
PGY-1 Progress Note discussed with attending    PAGER #: [[phone]] TILL 5:00 PM  PLEASE CONTACT ON CALL TEAM:   - On Call Team (Please refer to [place]) FROM 5:00 PM - 8:30PM  - Nightfloat Team FROM 8:30 -7:30 AM    INTERVAL HPI/OVERNIGHT EVENTS:       REVIEW OF SYSTEMS:  CONSTITUTIONAL: No fever, weight loss, or fatigue  RESPIRATORY: No cough, wheezing, chills or hemoptysis; No shortness of breath  CARDIOVASCULAR: No chest pain, palpitations, dizziness, or leg swelling  GASTROINTESTINAL: No abdominal pain. No nausea, vomiting, or hematemesis; No diarrhea or constipation. No melena or hematochezia.  GENITOURINARY: No dysuria or hematuria, urinary frequency  NEUROLOGICAL: No headaches, memory loss, loss of strength, numbness, or tremors  SKIN: No itching, burning, rashes, or lesions     MEDICATIONS  (STANDING):  albuterol/ipratropium for Nebulization 3 milliLiter(s) Nebulizer every 6 hours  amLODIPine   Tablet 10 milliGRAM(s) Oral daily  budesonide 160 MICROgram(s)/formoterol 4.5 MICROgram(s) Inhaler 2 Puff(s) Inhalation two times a day  calcium carbonate    500 mG (Tums) Chewable 1 Tablet(s) Chew two times a day  chlorhexidine 2% Cloths 1 Application(s) Topical daily  epoetin beverley-epbx (RETACRIT) Injectable 74506 Unit(s) IV Push <User Schedule>  ferrous    sulfate 325 milliGRAM(s) Oral daily  furosemide    Tablet 80 milliGRAM(s) Oral daily  heparin   Injectable 5000 Unit(s) SubCutaneous every 8 hours  hydrALAZINE 25 milliGRAM(s) Oral three times a day  insulin glargine Injectable (LANTUS) 4 Unit(s) SubCutaneous at bedtime  insulin lispro (ADMELOG) corrective regimen sliding scale   SubCutaneous three times a day before meals  insulin lispro (ADMELOG) corrective regimen sliding scale   SubCutaneous at bedtime  levothyroxine 25 MICROGram(s) Oral daily  metoclopramide 10 milliGRAM(s) Oral three times a day  metolazone 10 milliGRAM(s) Oral daily  metoprolol succinate ER 25 milliGRAM(s) Oral daily  pantoprazole    Tablet 40 milliGRAM(s) Oral two times a day  simvastatin 40 milliGRAM(s) Oral at bedtime  sucralfate 1 Gram(s) Oral four times a day    MEDICATIONS  (PRN):  acetaminophen     Tablet .. 650 milliGRAM(s) Oral every 6 hours PRN Temp greater or equal to 38C (100.4F), Mild Pain (1 - 3)      Vital Signs Last 24 Hrs  T(C): 36.9 (23 Mar 2023 06:04), Max: 37 (22 Mar 2023 13:35)  T(F): 98.4 (23 Mar 2023 06:04), Max: 98.6 (22 Mar 2023 13:35)  HR: 94 (23 Mar 2023 06:04) (94 - 95)  BP: 137/69 (23 Mar 2023 06:04) (137/69 - 155/91)  BP(mean): --  RR: 17 (23 Mar 2023 06:04) (17 - 18)  SpO2: 96% (23 Mar 2023 06:04) (92% - 96%)    Parameters below as of 23 Mar 2023 06:04  Patient On (Oxygen Delivery Method): mask, Venturi  O2 Flow (L/min): 6      PHYSICAL EXAMINATION:  GENERAL: NAD, well built  HEAD:  Atraumatic, Normocephalic  EYES:  conjunctiva and sclera clear  NECK: Supple, No JVD, Normal thyroid  CHEST/LUNG: Clear to auscultation. Clear to percussion bilaterally; No rales, rhonchi, wheezing, or rubs  HEART: Regular rate and rhythm; No murmurs, rubs, or gallops  ABDOMEN: Soft, Nontender, Nondistended; Bowel sounds present  NERVOUS SYSTEM:  Alert & Oriented X3,    EXTREMITIES:  2+ Peripheral Pulses, No clubbing, cyanosis, or edema  SKIN: warm dry                          8.0    2.72  )-----------( 92       ( 22 Mar 2023 07:40 )             25.3     03-22    134<L>  |  98  |  62<H>  ----------------------------<  119<H>  5.2   |  27  |  8.76<H>    Ca    8.5      22 Mar 2023 07:40  Phos  2.4     03-22  Mg     2.6     03-22                    I&O's Summary      CAPILLARY BLOOD GLUCOSE      POCT Blood Glucose.: 194 mg/dL (23 Mar 2023 07:31)    CAPILLARY BLOOD GLUCOSE      POCT Blood Glucose.: 194 mg/dL (23 Mar 2023 07:31)  POCT Blood Glucose.: 147 mg/dL (22 Mar 2023 21:04)  POCT Blood Glucose.: 263 mg/dL (22 Mar 2023 16:25)  POCT Blood Glucose.: 230 mg/dL (22 Mar 2023 11:16)      RADIOLOGY & ADDITIONAL TESTS:

## 2023-03-23 NOTE — PROGRESS NOTE ADULT - PROBLEM SELECTOR PLAN 4
pt takes ss at home  Will continue with SS  monitor blood sugars

## 2023-03-24 NOTE — ED PROVIDER NOTE - DIAGNOSIS COUNSELING, MDM
24-Mar-2023 15:31 conducted a detailed discussion... I had a detailed discussion with the patient and/or guardian regarding the historical points, exam findings, and any diagnostic results supporting the discharge/admit diagnosis.

## 2023-04-04 NOTE — PATIENT PROFILE ADULT - FUNCTIONAL ASSESSMENT - BASIC MOBILITY 1.
Continue current medications.  Please start checking your blood pressure 2 mornings a week before medications and 2 different evenings a week at bedtime and send me numbers after a few weeks please try seeing if every other day Celebrex provides adequate comfort.  Return to see me if all is stable in 6 months  
2 = A lot of assistance

## 2023-04-06 NOTE — ASU PATIENT PROFILE, ADULT - BILL OF RIGHTS/ADMISSION INFORMATION PROVIDED TO:
Dupuytren's Disease: Care Instructions  Overview     In Dupuytren's disease, the fingers become stiff and curl toward the palm. It is caused by thick tissue that grows under the skin in the palm of the hand. Sometimes the condition affects the palm but not the fingers. If the tissue gets thicker and affects one or more fingers, it may limit movement of your fingers and hand. The disease can cause your fingers to bend so you can't straighten them. This is called a contracture. Sometimes the condition can occur in the soles of the feet. The cause of Dupuytren's disease is not known. It may get worse slowly. If you have a mild case, you may be able to keep your fingers moving with regular stretching. Surgery usually helps in severe cases. However, Dupuytren's disease can come back. Follow-up care is a key part of your treatment and safety. Be sure to make and go to all appointments, and call your doctor if you are having problems. It's also a good idea to know your test results and keep a list of the medicines you take. How can you care for yourself at home? Follow your doctor's advice for physical or occupational therapy and exercises to put your fingers and hand through a range of motion. Two times a day, massage your hand and gently stretch the fingers back. This can get rid of tightness and help keep your fingers flexible. Try to avoid curling your hand tightly. For example, use utensils and tools that have larger hand . When should you call for help? Call your doctor now or seek immediate medical care if:    You have numbness in your fingers. You have a wound or sore on your finger or palm. Your hand or fingers get worse. Watch closely for changes in your health, and be sure to contact your doctor if you have any problems. Where can you learn more?   Go to http://www.gray.com/  Enter T857 in the search box to learn more about \"Dupuytren's Disease: Care Instructions. \"  Current as of: November 9, 2022               Content Version: 13.6  © 0433-7524 Healthwise, [facility]. Care instructions adapted under license by [employer] (which disclaims liability or warranty for this information). If you have questions about a medical condition or this instruction, always ask your healthcare professional. [place] [address] any warranty or liability for your use of this information. Patient

## 2023-04-17 NOTE — HISTORY OF PRESENT ILLNESS
[FreeTextEntry1] : 63 yo male with history of esrd on hd presents for follow up bleeding after dialysis in the distal puncture site

## 2023-04-17 NOTE — REASON FOR VISIT
----- Message from Dennis Hu DO sent at 2/1/2021  9:57 AM CST -----  Mom is unsure if she is supposed to have follow up with Jasmina Khanna at [facility]. Can you contact them to see if she needs follow up? [Follow-Up Visit] : a follow-up visit for

## 2023-04-17 NOTE — PHYSICAL EXAM
[Thrill] : thrill [Pulsatile Thrill] : pulsatile thrill [Aneurysm] : no aneurysm [Bleeding] : no bleeding [Hand well perfused] : hand well perfused [Ulcer] : no ulcer [Normal] : coordination grossly intact, no focal deficits [de-identified] : Thinning over aneurysmal dilations no open wounds or ulcers

## 2023-04-26 PROBLEM — T82.898A INADEQUATE FLOW OF DIALYSIS ARTERIOVENOUS FISTULA: Status: ACTIVE | Noted: 2018-11-30

## 2023-04-26 PROBLEM — N18.6 ESRD (END STAGE RENAL DISEASE) ON DIALYSIS: Status: ACTIVE | Noted: 2019-10-30

## 2023-05-03 NOTE — HISTORY OF PRESENT ILLNESS
[] : right radiocephalic fistula [FreeTextEntry1] : creation 11-18-18 Dr Mason [FreeTextEntry4] : yesterday [FreeTextEntry5] : yesterday at 7pm [FreeTextEntry6] : Dr Faith

## 2023-05-03 NOTE — PAST MEDICAL HISTORY
[Increasing age ( >40 years old)] : Increasing age ( >40 years old) [No therapy indicated for cases scheduled for less than one hour] : No therapy indicated for cases scheduled for less than one hour. [Altered mobility] : Altered mobility [FreeTextEntry1] : Malignant Hyperthermia Screening Tool and Risk of Bleeding Assessment \par \par Mr. KIAN VALERO denies family of unexpected death following Anesthesia or Exercise.\par Denies Family history of Malignant Hyperthermia, Muscle or Neuromuscular disorder and High Temperature  following exercise.\par \par Mr. KIAN VALERO denies history of Muscle Spasm, Dark or Chocolate- Colored and Unanticipated fever immediately following anaesthesia or serious exercise.\par Mr. KIAN VALERO also denies bleeding tendencies/Risks of Bleeding.\par

## 2023-05-11 NOTE — DISCHARGE NOTE NURSING/CASE MANAGEMENT/SOCIAL WORK - HAVE YOU HAD A FIRST COVID-19 BOOSTER?
Problem: Discharge Planning  Goal: Discharge to home or other facility with appropriate resources  Outcome: Progressing     Problem: Safety - Adult  Goal: Free from fall injury  Outcome: Progressing     Problem: ABCDS Injury Assessment  Goal: Absence of physical injury  Outcome: Progressing     Problem: Skin/Tissue Integrity  Goal: Absence of new skin breakdown  Description: 1. Monitor for areas of redness and/or skin breakdown  2. Assess vascular access sites hourly  3. Every 4-6 hours minimum:  Change oxygen saturation probe site  4. Every 4-6 hours:  If on nasal continuous positive airway pressure, respiratory therapy assess nares and determine need for appliance change or resting period.   Outcome: Progressing No

## 2023-05-15 NOTE — DISCUSSION/SUMMARY
[FreeTextEntry1] : 61 yo male with history of esrd on hd presents for follow up \par \par \par will continue to monitor given no issues with hd \par pt to follow up in 3-4 months with repeat duplex\par \par  patient complaining of right leg numbness.  No evidence of ischemia on PVRs.  Conservative management.\par

## 2023-05-15 NOTE — PHYSICAL EXAM
[Thrill] : thrill [Pulsatile Thrill] : no pulsatile thrill [Aneurysm] : aneurysm [Bleeding] : no bleeding [Hand well perfused] : hand well perfused [Ulcer] : no ulcer [Normal] : coordination grossly intact, no focal deficits [de-identified] : intact

## 2023-05-27 NOTE — ED ADULT NURSE NOTE - NS ED NURSE REPORT GIVEN TO FT
Panel Management:    Patient is due for a diabetes check up.  They are due for fasting blood work.  Lipid panel and A1C    Orders were placed, please have lab work done a day or so before visit with me.  Please notify patient to schedule an appointment, I typically book out about 1 month.  Please remind them to bring meter to visit so I can review blood sugar data.     MICHI Foley

## 2023-05-27 NOTE — H&P ADULT - HISTORY OF PRESENT ILLNESS
62 year old male w/ PMHx ESRD on dialysis (TThS), DM2, CAD s/p MI, CHF, glaucoma, legally blind, HTN, orthostatic hypotension, hyperparathyroid, hyperkalemia, LS spinal stenosis, osteoarthritis, osteoporosis, PAD, hx of osteomyelitis of thoracic vertebrae, remote hx of crack-cocaine use disorder, PSH of BKA of L was sent from facility for anemia. HGB noted to be 5.1 on outpatient labs. Pt agitated that he is in the hospital, does not offer any more history. Pt denies any bleeding or melena. Denies any other acute complaints including fever/chills, headache, dizziness, chest pain, palpitations, cough, SOB, n/v/d/c, dysuria or leg swelling.   Of note: pt was recently discharged from [facility] for COPD exacerbation, was noted to have an episode of hematemesis at the time but declined further intervention at the time.

## 2023-05-27 NOTE — ED PROVIDER NOTE - PROGRESS NOTE DETAILS
Labs explained to pt  Hgb 5.1.  Case d/w Dr. Young renal, will have pt dialyzed today, also to give 2 units of blood products  case d/w Dr. De La Torre, will admit

## 2023-05-27 NOTE — ED PROVIDER NOTE - CLINICAL SUMMARY MEDICAL DECISION MAKING FREE TEXT BOX
pt with UGIB-esophagitis, now sent in for low H/H.  Since pt with h/o ESRD, will recheck Hgb, if required 2 units of blood transfusion, will have to admit pt for dialysis & blood transfusion to avoid fluid overload

## 2023-05-27 NOTE — ED PROVIDER NOTE - CARDIAC HEART SOUNDS
Pt here via EMS from home for +headachce, worse behind right eye, +nausea, +fever, +chills that started Friday. Reports pain to top of head with palpation. Denies sick contacts or vomiting.    MURMUR

## 2023-05-27 NOTE — PATIENT PROFILE ADULT - FALL HARM RISK - HARM RISK INTERVENTIONS
Assistance with ambulation/Assistance OOB with selected safe patient handling equipment/Communicate Risk of Fall with Harm to all staff/Discuss with provider need for PT consult/Monitor gait and stability/Provide patient with walking aids - walker, cane, crutches/Reinforce activity limits and safety measures with patient and family/Tailored Fall Risk Interventions/Use of alarms - bed, chair and/or voice tab/Visual Cue: Yellow wristband and red socks/Bed in lowest position, wheels locked, appropriate side rails in place/Call bell, personal items and telephone in reach/Instruct patient to call for assistance before getting out of bed or chair/Non-slip footwear when patient is out of bed/[place] to call system/Physically safe environment - no spills, clutter or unnecessary equipment/Purposeful Proactive Rounding/Room/bathroom lighting operational, light cord in reach

## 2023-05-27 NOTE — ED ADULT NURSE NOTE - OBJECTIVE STATEMENT
As per pt, sent from [facility] for c/o abnormal blood levels today. RUE AV fistula noted w/ (+) thrills/bruit palpated, HD days T/TH/S, last HD was 05/25/2023. L BKA noted. Pt denies all other symptoms.

## 2023-05-27 NOTE — ED PROVIDER NOTE - OBJECTIVE STATEMENT
62 y.o. adult Home male with h/o ESRD, last HD Thurs.  BIBA reportedly pt with Hgb 5.9, sent for blood transfusion.  Pt denies CP, sob.  Pt normally walks with a rollaider

## 2023-05-27 NOTE — CONSULT NOTE ADULT - SUBJECTIVE AND OBJECTIVE BOX
[facility] : Progress Note :: [phone], (office [phone]),   Dr Mosher / Dr Washington / Dr Young / Dr Doll / Dr Varsha UTRCIOS / Dr Tello / Dr Garcia / Dr Larry qiu  _____________________________________________________________________________________________  Patient is a 62y Male with H/O ESRD on HD TTS at [facility].   H/O duodenitis. sent from NH with low hgb 5.9. in ed hgb 5.1  He requires PRBC transfusions.  renal consulted for ESRD.    fish (Rash)  No Known Drug Allergies  liver (Anaphylaxis)    Hospital Medications:   MEDICATIONS  (STANDING):  epoetin beverley-epbx (RETACRIT) Injectable 29767 Unit(s) IV Push <User Schedule>        VITALS:  T(F): 97.3 (05-27-23 @ 13:36), Max: 98.8 (05-27-23 @ 10:11)  HR: 85 (05-27-23 @ 13:36)  BP: 148/65 (05-27-23 @ 13:36)  RR: 18 (05-27-23 @ 13:36)  SpO2: 95% (05-27-23 @ 13:36)  Wt(kg): --    Height (cm): 157.5 (05-27 @ 10:14)  PHYSICAL EXAM:  Constitutional: NAD  HEENT: anicteric sclera, oropharynx clear, MMM  Neck: No JVD  Respiratory: CTAB, no wheezes, rales or rhonchi  Cardiovascular: S1, S2, RRR  Gastrointestinal: BS+, soft, NT/ND  Vascular Access: AVF     LABS:  05-27    138  |  99  |  26<H>  ----------------------------<  193<H>  3.8   |  29  |  8.56<H>    Ca    8.4      27 May 2023 11:34    TPro  5.5<L>  /  Alb  2.7<L>  /  TBili  0.3  /  DBili      /  AST  15  /  ALT  36  /  AlkPhos  77  05-27    Creatinine Trend: 8.56 <--                        5.1    3.20  )-----------( 96       ( 27 May 2023 11:34 )             17.3     Urine Studies:      RADIOLOGY & ADDITIONAL STUDIES:

## 2023-05-27 NOTE — ED PROVIDER NOTE - MUSCULOSKELETAL, MLM
Spine appears normal, range of motion is not limited, no muscle or joint tenderness, LLE-BKA, RUE-AV graft with thrills

## 2023-05-27 NOTE — H&P ADULT - NSHPPHYSICALEXAM_GEN_ALL_CORE
T(C): 37 (05-27-23 @ 14:25), Max: 37.1 (05-27-23 @ 10:11)  HR: 84 (05-27-23 @ 14:25) (84 - 90)  BP: 145/74 (05-27-23 @ 14:25) (129/54 - 158/78)  RR: 18 (05-27-23 @ 14:25) (18 - 18)  SpO2: 100% (05-27-23 @ 14:25) (95% - 100%)    GENERAL: patient appears well, NAD, pleasant  HEAD: normocephalic, atraumatic  EYES: sclera clear, no exudates  ENMT: oropharynx clear without erythema, no exudates, moist mucous membranes  NECK: supple, soft, no thyromegaly noted  LUNGS: clear to auscultation bilaterally, no wheezing, rhonchi or rales appreciated  HEART: S1/S2, regular rate and rhythm, no murmurs noted, no lower extremity edema  GASTROINTESTINAL: abdomen is soft, nondistended, nontender, normoactive bowel sounds, no palpable masses  INTEGUMENT: good skin turgor, no lesions noted  MUSCULOSKELETAL: no clubbing or cyanosis, +L BKA  NEUROLOGIC: AAO x3, CNII-XII intact, no obvious sensorimotor deficits noted T(C): 37 (05-27-23 @ 14:25), Max: 37.1 (05-27-23 @ 10:11)  HR: 84 (05-27-23 @ 14:25) (84 - 90)  BP: 145/74 (05-27-23 @ 14:25) (129/54 - 158/78)  RR: 18 (05-27-23 @ 14:25) (18 - 18)  SpO2: 100% (05-27-23 @ 14:25) (95% - 100%)    GENERAL: patient appears well, NAD, minimally cooperative with exam  HEAD: normocephalic, atraumatic  EYES: sclera clear, no exudates  ENMT: oropharynx clear without erythema, no exudates, moist mucous membranes  NECK: supple, soft, no thyromegaly noted  LUNGS: +crackles b/l bases, no wheezing appreciated  HEART: S1/S2, regular rate and rhythm, no murmurs noted, +trace lower extremity edema  GASTROINTESTINAL: abdomen is soft, nondistended, nontender, normoactive bowel sounds, no palpable masses  INTEGUMENT: good skin turgor, no lesions noted  MUSCULOSKELETAL: no clubbing or cyanosis, +L BKA, +RUE AV fistula w/ papable thrill  NEUROLOGIC: AAO x3, CNII-XII intact, no obvious sensorimotor deficits noted

## 2023-05-27 NOTE — CHART NOTE - NSCHARTNOTEFT_GEN_A_CORE
EVENT: Notified by nurse regarding patient refusing post-transfusion f/u CBC    62M w/ PMH of ESRD on dialysis (TThS), DM2, CAD s/p MI, CHF, glaucoma, legally blind, HTN, orthostatic hypotension, hyperparathyroid, hyperkalemia, LS spinal stenosis, osteoarthritis, osteoporosis, PAD, hx of osteomyelitis of thoracic vertebrae, remote hx of crack-cocaine use disorder, PSH of BKA of L sent from facility for anemia HGB 5.4. Admitted to medicine for further management.    Patient scheduled for post-transfusion CBC in which patient is refusing blood draw, reiterate the importance for the f/u labs, patient still refused. Patient alert and oriented x 4    OBJECTIVE:  Vital Signs Last 24 Hrs  T(C): 37.1 (27 May 2023 18:20), Max: 37.1 (27 May 2023 10:11)  T(F): 98.7 (27 May 2023 18:20), Max: 98.8 (27 May 2023 10:11)  HR: 92 (27 May 2023 18:20) (83 - 92)  BP: 146/77 (27 May 2023 18:20) (129/54 - 158/78)  BP(mean): --  RR: 18 (27 May 2023 18:20) (18 - 18)  SpO2: 90% (27 May 2023 18:20) (90% - 100%)    Parameters below as of 27 May 2023 14:25  Patient On (Oxygen Delivery Method): nasal cannula  O2 Flow (L/min): 2      LABS:                        5.1    3.20  )-----------( 96       ( 27 May 2023 11:34 )             17.3     05-27    138  |  99  |  26<H>  ----------------------------<  193<H>  3.8   |  29  |  8.56<H>    Ca    8.4      27 May 2023 11:34    TPro  5.5<L>  /  Alb  2.7<L>  /  TBili  0.3  /  DBili  x   /  AST  15  /  ALT  36  /  AlkPhos  77  05-27      PROBLEM: Anemia likely in setting of CKD  - hgb on admission. Patient s/p 2 units of PRBC. No active s/s of bleeding   PLAN:  Patient refused post-transfusion F/U CBC  c/w sucralfate and PPI BID  monitor CBC in AM

## 2023-05-27 NOTE — ED ADULT NURSE NOTE - CHPI ED NUR SYMPTOMS NEG
No
no chills/no decreased eating/drinking/no dizziness/no nausea/no pain/no tingling/no vomiting/no weakness

## 2023-05-27 NOTE — H&P ADULT - PROBLEM SELECTOR PLAN 1
HGB 5.4   likely in setting of CKD  will transfused 2pRBC with HD   c/w epo per nephro  pt refused GI w/u in the past    monitor CBC HGB 5.4   likely in setting of CKD  pt w/ known esophagitis, refused further GI w/u in the past    will transfuse 2pRBC with HD   c/w sucralfate and PPI BID  monitor CBC

## 2023-05-27 NOTE — ED ADULT NURSE NOTE - NSFALLUNIVINTERV_ED_ALL_ED
Bed/Stretcher in lowest position, wheels locked, appropriate side rails in place/Call bell, personal items and telephone in reach/Instruct patient to call for assistance before getting out of bed/chair/stretcher/Non-slip footwear applied when patient is off stretcher/[place] to call system/Physically safe environment - no spills, clutter or unnecessary equipment/Purposeful proactive rounding/Room/bathroom lighting operational, light cord in reach

## 2023-05-27 NOTE — H&P ADULT - ASSESSMENT
62 year old, Male, w/ PMH of ESRD on dialysis (Tues, Thurs, Sat), DM2, CAD s/p MI, CHF, glaucoma, legally blind, HTN, orthostatic hypotension, hyperparathyroid, hyperkalemia, LS spinal stenosis, osteoarthritis, osteoporosis, PAD, hx of osteomyelitis of thoracic vertebrae, remote hx of crack-cocaine use disorder, PSH of BKA of L sent from facility for anemia HGB 5.4. Admitted to medicine for further management 62M w/ PMH of ESRD on dialysis (TThS), DM2, CAD s/p MI, CHF, glaucoma, legally blind, HTN, orthostatic hypotension, hyperparathyroid, hyperkalemia, LS spinal stenosis, osteoarthritis, osteoporosis, PAD, hx of osteomyelitis of thoracic vertebrae, remote hx of crack-cocaine use disorder, PSH of BKA of L sent from facility for anemia HGB 5.4. Admitted to medicine for further management

## 2023-05-27 NOTE — CONSULT NOTE ADULT - ASSESSMENT
Patient is a 62y Male with H/O ESRD on HD TTS at [facility].   H/O duodenitis. sent from NH with low hgb 5.9. in ED  hgb 5.1  He requires PRBC transfusions.  renal consulted for ESRD.  # ESRD. HD today with intradialysis PRBC transfusion epogen on HD   # CKDMBD  RESUME BINDERS. CHECK PHOS ON HD   THANKS FOR THE CONSULT

## 2023-05-28 NOTE — CHART NOTE - NSCHARTNOTEFT_GEN_A_CORE
EVENT:  temp 100.4      HPI:  62 year old male w/ PMHx ESRD on dialysis (TThS), DM2, CAD s/p MI, CHF, glaucoma, legally blind, HTN, orthostatic hypotension, hyperparathyroid, hyperkalemia, LS spinal stenosis, osteoarthritis, osteoporosis, PAD, hx of osteomyelitis of thoracic vertebrae, remote hx of crack-cocaine use disorder, PSH of BKA of L was sent from facility for anemia. HGB noted to be 5.1 on outpatient labs. Pt agitated that he is in the hospital, does not offer any more history. Pt denies any bleeding or melena. Denies any other acute complaints including fever/chills, headache, dizziness, chest pain, palpitations, cough, SOB, n/v/d/c, dysuria or leg swelling.   Of note: pt was recently discharged from [facility] for COPD exacerbation, was noted to have an episode of hematemesis at the time but declined further intervention at the time.  (27 May 2023 14:51)        OBJECTIVE:  Vital Signs Last 24 Hrs  T(C): 37.6 (28 May 2023 13:26), Max: 38 (28 May 2023 09:44)  T(F): 99.7 (28 May 2023 13:26), Max: 100.4 (28 May 2023 09:44)  HR: 99 (28 May 2023 13:26) (83 - 99)  BP: 144/75 (28 May 2023 13:26) (144/75 - 163/79)  BP(mean): --  RR: 18 (28 May 2023 13:26) (17 - 18)  SpO2: 93% (28 May 2023 13:26) (88% - 100%)    Parameters below as of 28 May 2023 13:26  Patient On (Oxygen Delivery Method): mask, simple face  O2 Flow (L/min): 3      LABS:                        5.1    3.20  )-----------( 96       ( 27 May 2023 11:34 )             17.3     05-27    138  |  99  |  26<H>  ----------------------------<  193<H>  3.8   |  29  |  8.56<H>    Ca    8.4      27 May 2023 11:34    TPro  5.5<L>  /  Alb  2.7<L>  /  TBili  0.3  /  DBili  x   /  AST  15  /  ALT  36  /  AlkPhos  77  05-27            PLAN:   1. discussed with Dr. De La Torre  2. blood cultures x 2 sets drawn ( per attending request)    Follow- up :  1. blood culture results  2. monitor temps EVENT:  temp 100.4      HPI:  62 year old male w/ PMHx ESRD on dialysis (TThS), DM2, CAD s/p MI, CHF, glaucoma, legally blind, HTN, orthostatic hypotension, hyperparathyroid, hyperkalemia, LS spinal stenosis, osteoarthritis, osteoporosis, PAD, hx of osteomyelitis of thoracic vertebrae, remote hx of crack-cocaine use disorder, PSH of BKA of L was sent from facility for anemia. HGB noted to be 5.1 on outpatient labs. Pt agitated that he is in the hospital, does not offer any more history. Pt denies any bleeding or melena. Denies any other acute complaints including fever/chills, headache, dizziness, chest pain, palpitations, cough, SOB, n/v/d/c, dysuria or leg swelling.   Of note: pt was recently discharged from [facility] for COPD exacerbation, was noted to have an episode of hematemesis at the time but declined further intervention at the time.  (27 May 2023 14:51)        OBJECTIVE:  Vital Signs Last 24 Hrs  T(C): 37.6 (28 May 2023 13:26), Max: 38 (28 May 2023 09:44)  T(F): 99.7 (28 May 2023 13:26), Max: 100.4 (28 May 2023 09:44)  HR: 99 (28 May 2023 13:26) (83 - 99)  BP: 144/75 (28 May 2023 13:26) (144/75 - 163/79)  BP(mean): --  RR: 18 (28 May 2023 13:26) (17 - 18)  SpO2: 93% (28 May 2023 13:26) (88% - 100%)    Parameters below as of 28 May 2023 13:26  Patient On (Oxygen Delivery Method): mask, simple face  O2 Flow (L/min): 3      LABS:                        5.1    3.20  )-----------( 96       ( 27 May 2023 11:34 )             17.3     05-27    138  |  99  |  26<H>  ----------------------------<  193<H>  3.8   |  29  |  8.56<H>    Ca    8.4      27 May 2023 11:34    TPro  5.5<L>  /  Alb  2.7<L>  /  TBili  0.3  /  DBili  x   /  AST  15  /  ALT  36  /  AlkPhos  77  05-27            PLAN:   1. discussed with Dr. De La Torre  2. blood cultures x 2 sets drawn ( per attending request)  3. cxr- urgent    Follow- up :  1. blood culture results  2. monitor temps  3. f/u cxr

## 2023-05-28 NOTE — CHART NOTE - NSCHARTNOTEFT_GEN_A_CORE
EVENT: C/o chest pain    BRIEF HPI: 62 year old male w/ PMH ESRD on dialysis (TThS), DM2, CAD s/p MI, CHF, glaucoma, legally blind, HTN, orthostatic hypotension, hyperparathyroid, hyperkalemia, LS spinal stenosis, osteoarthritis, osteoporosis, PAD, hx of osteomyelitis of thoracic vertebrae, remote hx of crack-cocaine use disorder, PSH of BKA of L was sent from facility for anemia. HGB noted to be 5.1 on outpatient labs. Pt agitated that he is in the hospital, does not offer any more history. Pt denies any bleeding or melena. Denies any other acute complaints including fever/chills, headache, dizziness, chest pain, palpitations, cough, SOB, n/v/d/c, dysuria or leg swelling. Of note: pt was recently discharged from [facility] for COPD exacerbation, was noted to have an episode of hematemesis at the time but declined further intervention at the time.    OBJECTIVE:  Vital Signs Last 24 Hrs  T(C): 37.6 (28 May 2023 13:26), Max: 38 (28 May 2023 09:44)  T(F): 99.7 (28 May 2023 13:26), Max: 100.4 (28 May 2023 09:44)  HR: 99 (28 May 2023 13:26) (87 - 99)  BP: 144/75 (28 May 2023 13:26) (144/75 - 163/79)  BP(mean): --  RR: 18 (28 May 2023 13:26) (17 - 18)  SpO2: 93% (28 May 2023 13:26) (88% - 100%)    Parameters below as of 28 May 2023 13:26  Patient On (Oxygen Delivery Method): mask, simple face  O2 Flow (L/min): 3    FOCUSED PHYSICAL EXAM:    LABS:                        8.9    4.29  )-----------( 128      ( 28 May 2023 14:07 )             28.2     05-27    138  |  99  |  26<H>  ----------------------------<  193<H>  3.8   |  29  |  8.56<H>    Ca    8.4      27 May 2023 11:34    TPro  5.5<L>  /  Alb  2.7<L>  /  TBili  0.3  /  DBili  x   /  AST  15  /  ALT  36  /  AlkPhos  77  05-27      EKG:   IMGAGING:    PLAN:   1. EKG now    FOLLOW UP / RESULT: EVENT: Alerted by nurse that difficulty breathing and chest pain, on assessment pt denies chest pain, removes oxygen and refusing blood work    BRIEF HPI: 62 year old male w/ PMH ESRD on dialysis (TThS), DM2, CAD s/p MI, CHF, glaucoma, legally blind, HTN, orthostatic hypotension, hyperparathyroid, hyperkalemia, LS spinal stenosis, osteoarthritis, osteoporosis, PAD, hx of osteomyelitis of thoracic vertebrae, remote hx of crack-cocaine use disorder, PSH of BKA of L was sent from facility for anemia. HGB noted to be 5.1 on outpatient labs. Pt agitated that he is in the hospital, does not offer any more history. Pt denies any bleeding or melena. Denies any other acute complaints including fever/chills, headache, dizziness, chest pain, palpitations, cough, SOB, n/v/d/c, dysuria or leg swelling. Of note: pt was recently discharged from [facility] for COPD exacerbation, was noted to have an episode of hematemesis at the time but declined further intervention at the time.    OBJECTIVE:  Vital Signs Last 24 Hrs  T(C): 37.6 (28 May 2023 13:26), Max: 38 (28 May 2023 09:44)  T(F): 99.7 (28 May 2023 13:26), Max: 100.4 (28 May 2023 09:44)  HR: 99 (28 May 2023 13:26) (87 - 99)  BP: 144/75 (28 May 2023 13:26) (144/75 - 163/79)  BP(mean): --  RR: 18 (28 May 2023 13:26) (17 - 18)  SpO2: 93% (28 May 2023 13:26) (88% - 100%)    Parameters below as of 28 May 2023 13:26  Patient On (Oxygen Delivery Method): mask, simple face  O2 Flow (L/min): 3    FOCUSED PHYSICAL EXAM:  CV: S1 S2 regular  RESP: Unlabored on room air  NEURO: Disgruntled, cursing, demanding pain meds and meds to sleep    LABS:                        8.9    4.29  )-----------( 128      ( 28 May 2023 14:07 )             28.2     05-27    138  |  99  |  26<H>  ----------------------------<  193<H>  3.8   |  29  |  8.56<H>    Ca    8.4      27 May 2023 11:34    TPro  5.5<L>  /  Alb  2.7<L>  /  TBili  0.3  /  DBili  x   /  AST  15  /  ALT  36  /  AlkPhos  77  05-27    EKG: Sinus tach  Vent rate 103 bpm  NC interval 166ms  QRS duration 82 ms  QT/QTc 382/500 ms    PLAN:   1. EKG now  2. Troponin now (pt refuse)  3, Cont Alprazolam 0.25 al GRAM(s) Oral at bedtime  4. Cont oxycodone 5 mG/acetaminophen 325 mG 1 Tablet(s) Oral every 6 hours PRN for severe pain  5. Obtain IV access (pt refuse)    FOLLOW UP / RESULT: continue reassessment

## 2023-05-28 NOTE — PROGRESS NOTE ADULT - ASSESSMENT
Patient is a 62y Male with H/O ESRD on HD TTS at [facility].   H/O duodenitis. sent from NH with low hgb 5.9. in ED  hgb 5.1    # ESRD.S/P  HD yesterday  with intradialysis PRBC transfusion epogen on HD   encouraged to agree for labs reviewed. draws  # CKDMBD  RESUME BINDERS SEVELAMER.  CHECK PHOS

## 2023-05-29 NOTE — CHART NOTE - NSCHARTNOTEFT_GEN_A_CORE
EVENT:  Notified by RN, pt requested "stat duoneb."      SUBJECTIVE:  No reported distress.      OBJECTIVE      Vital Signs Last 24 Hrs  T(C): 37 (29 May 2023 13:04), Max: 37.6 (28 May 2023 21:20)  T(F): 98.6 (29 May 2023 13:04), Max: 99.7 (28 May 2023 21:20)  HR: 98 (29 May 2023 13:04) (98 - 103)  BP: 159/78 (29 May 2023 13:04) (159/78 - 165/86)  RR: 17 (29 May 2023 13:04) (17 - 17)  SpO2: 90% (29 May 2023 13:04) (90% - 100%)    Parameters below as of 29 May 2023 13:04  Patient On (Oxygen Delivery Method): room air        LABS:                        8.9    4.29  )-----------( 128      ( 28 May 2023 14:07 )             28.2             EKG:   IMAGING:    ASSESSMENT:  HPI:  62 year old male w/ PMHx ESRD on dialysis (TThS), DM2, CAD s/p MI, CHF, glaucoma, legally blind, HTN, orthostatic hypotension, hyperparathyroid, hyperkalemia, LS spinal stenosis, osteoarthritis, osteoporosis, PAD, hx of osteomyelitis of thoracic vertebrae, remote hx of crack-cocaine use disorder, PSH of BKA of L was sent from facility for anemia. HGB noted to be 5.1 on outpatient labs. Pt agitated that he is in the hospital, does not offer any more history. Pt denies any bleeding or melena. Denies any other acute complaints including fever/chills, headache, dizziness, chest pain, palpitations, cough, SOB, n/v/d/c, dysuria or leg swelling.   Of note: pt was recently discharged from [facility] for COPD exacerbation, was noted to have an episode of hematemesis at the time but declined further intervention at the time.        PLAN:    Stat Duoneb ordered x I    Of note, RN reported pt refused IV placement and AM labs.  IV Zofran changed to PO since pt's w/o IV access.

## 2023-05-29 NOTE — DIETITIAN INITIAL EVALUATION ADULT - PERTINENT MEDS FT
MEDICATIONS  (STANDING):  albuterol/ipratropium for Nebulization 3 milliLiter(s) Nebulizer every 6 hours  ALPRAZolam 0.25 milliGRAM(s) Oral at bedtime  amLODIPine   Tablet 10 milliGRAM(s) Oral daily  aspirin  chewable 81 milliGRAM(s) Oral daily  atorvastatin 80 milliGRAM(s) Oral at bedtime  budesonide 160 MICROgram(s)/formoterol 4.5 MICROgram(s) Inhaler 2 Puff(s) Inhalation two times a day  calcium carbonate    500 mG (Tums) Chewable 1 Tablet(s) Chew two times a day  cholecalciferol 1000 Unit(s) Oral daily  dextrose 5%. 1000 milliLiter(s) (50 mL/Hr) IV Continuous <Continuous>  epoetin beverley-epbx (RETACRIT) Injectable 44632 Unit(s) IV Push <User Schedule>  ferrous    sulfate 325 milliGRAM(s) Oral daily  folic acid 1 milliGRAM(s) Oral daily  furosemide    Tablet 80 milliGRAM(s) Oral daily  hydrALAZINE 25 milliGRAM(s) Oral three times a day  insulin glargine Injectable (LANTUS) 4 Unit(s) SubCutaneous at bedtime  insulin lispro (ADMELOG) corrective regimen sliding scale   SubCutaneous at bedtime  insulin lispro (ADMELOG) corrective regimen sliding scale   SubCutaneous three times a day before meals  insulin lispro Injectable (ADMELOG) 1 Unit(s) SubCutaneous three times a day before meals  latanoprost 0.005% Ophthalmic Solution 1 Drop(s) Both EYES at bedtime  levothyroxine 25 MICROGram(s) Oral daily  metoclopramide 10 milliGRAM(s) Oral three times a day  metolazone 10 milliGRAM(s) Oral daily  metoprolol succinate ER 50 milliGRAM(s) Oral daily  Nephro-luann 1 Tablet(s) Oral daily  pantoprazole    Tablet 40 milliGRAM(s) Oral two times a day  sevelamer carbonate 800 milliGRAM(s) Oral three times a day with meals  sodium zirconium cyclosilicate 10 Gram(s) Oral <User Schedule>  sucralfate suspension 1 Gram(s) Oral four times a day  tiotropium 2.5 MICROgram(s) Inhaler 2 Puff(s) Inhalation daily    MEDICATIONS  (PRN):  acetaminophen     Tablet .. 650 milliGRAM(s) Oral every 6 hours PRN Temp greater or equal to 38C (100.4F), Mild Pain (1 - 3)  dextrose Oral Gel 15 Gram(s) Oral once PRN Blood Glucose LESS THAN 70 milliGRAM(s)/deciliter  melatonin 3 milliGRAM(s) Oral at bedtime PRN Insomnia  ondansetron    Tablet 4 milliGRAM(s) Oral every 8 hours PRN Nausea and/or Vomiting  oxycodone    5 mG/acetaminophen 325 mG 1 Tablet(s) Oral every 6 hours PRN for severe pain

## 2023-05-29 NOTE — DIETITIAN INITIAL EVALUATION ADULT - PERTINENT LABORATORY DATA
POCT Blood Glucose.: 106 mg/dL (05-29-23 @ 11:19)  A1C with Estimated Average Glucose Result: 5.7 % (03-16-23 @ 10:30)  A1C with Estimated Average Glucose Result: 5.0 % (01-10-23 @ 05:35)  A1C with Estimated Average Glucose Result: 5.2 % (10-04-22 @ 11:35)

## 2023-05-29 NOTE — DIETITIAN INITIAL EVALUATION ADULT - PROBLEM SELECTOR PLAN 1
HGB 5.4   likely in setting of CKD  pt w/ known esophagitis, refused further GI w/u in the past    will transfuse 2pRBC with HD   c/w sucralfate and PPI BID  monitor CBC

## 2023-05-29 NOTE — DIETITIAN INITIAL EVALUATION ADULT - OTHER INFO
Pt denies weight loss. Reports [id number] @ 5'2 (pt has left BKA). Pt not happy w/ getting so much chicken (has allergy to fish), offered alternatives, called dinner request into kitchen. Pt reports he expects to be D/C'ed tomorrow.

## 2023-05-30 NOTE — DISCHARGE NOTE PROVIDER - PROVIDER TOKENS
PROVIDER:[TOKEN:[[id number]],FOLLOWUP:[1 week]],PROVIDER:[TOKEN:[[id number]],FOLLOWUP:[1-3 days]]

## 2023-05-30 NOTE — DISCHARGE NOTE PROVIDER - NSDCFUSCHEDAPPT_GEN_ALL_CORE_FT
[facility]  VASCULAR [address]  Scheduled Appointment: 07/24/2023    Ambrocio Mason  [facility]  VASCULAR [address]  Scheduled Appointment: 07/24/2023

## 2023-05-30 NOTE — DISCHARGE NOTE PROVIDER - HOSPITAL COURSE
62 year old male w/ PMHx ESRD on dialysis (TThS), DM2, CAD s/p MI, CHF, glaucoma, legally blind, HTN, orthostatic hypotension, hyperparathyroid, hyperkalemia, LS spinal stenosis, osteoarthritis, osteoporosis, PAD, hx of osteomyelitis of thoracic vertebrae, remote hx of crack-cocaine use disorder, PSH of BKA of L was sent from facility for anemia. HGB noted to be 5.1 on outpatient labs. Pt denies any bleeding or melena.    Of note: pt was recently discharged from [facility] for COPD exacerbation, was noted to have an episode of hematemesis at the time but declined further intervention at the time.   Pt. required transfusion 2 units PRBCs with appropriatete response.  No GI w/u required as pt. has recently completed extensive GI w/u.  Pt. is medically stable for discharge back to [facility].  OP HD followed by LUZ ELENA.

## 2023-05-30 NOTE — PROGRESS NOTE ADULT - SUBJECTIVE AND OBJECTIVE BOX
Patient is a 62y old  Male who presents with a chief complaint of Anemia (28 May 2023 15:46)    PATIENT IS SEEN AND EXAMINED IN MEDICAL FLOOR.    NGT [    ]    LUNA [   ]      GT [   ]    ALLERGIES:  fish (Rash)  No Known Drug Allergies  liver (Anaphylaxis)      Daily     Daily     VITALS:    Vital Signs Last 24 Hrs  T(C): 37 (29 May 2023 13:04), Max: 37.6 (28 May 2023 21:20)  T(F): 98.6 (29 May 2023 13:04), Max: 99.7 (28 May 2023 21:20)  HR: 98 (29 May 2023 13:04) (98 - 103)  BP: 159/78 (29 May 2023 13:04) (159/78 - 165/86)  BP(mean): --  RR: 17 (29 May 2023 13:04) (17 - 17)  SpO2: 90% (29 May 2023 13:04) (90% - 100%)    Parameters below as of 29 May 2023 13:04  Patient On (Oxygen Delivery Method): room air        LABS:    CBC Full  -  ( 28 May 2023 14:07 )  WBC Count : 4.29 K/uL  RBC Count : 2.99 M/uL  Hemoglobin : 8.9 g/dL  Hematocrit : 28.2 %  Platelet Count - Automated : 128 K/uL  Mean Cell Volume : 94.3 fl  Mean Cell Hemoglobin : 29.8 pg  Mean Cell Hemoglobin Concentration : 31.6 gm/dL  Auto Neutrophil # : x  Auto Lymphocyte # : x  Auto Monocyte # : x  Auto Eosinophil # : x  Auto Basophil # : x  Auto Neutrophil % : x  Auto Lymphocyte % : x  Auto Monocyte % : x  Auto Eosinophil % : x  Auto Basophil % : x            CAPILLARY BLOOD GLUCOSE      POCT Blood Glucose.: 106 mg/dL (29 May 2023 11:19)  POCT Blood Glucose.: 76 mg/dL (29 May 2023 07:39)  POCT Blood Glucose.: 120 mg/dL (28 May 2023 21:41)  POCT Blood Glucose.: 185 mg/dL (28 May 2023 16:50)          Creatinine Trend: 8.56<--  I&O's Summary              MEDICATIONS:    MEDICATIONS  (STANDING):  albuterol/ipratropium for Nebulization 3 milliLiter(s) Nebulizer every 6 hours  ALPRAZolam 0.25 milliGRAM(s) Oral at bedtime  amLODIPine   Tablet 10 milliGRAM(s) Oral daily  aspirin  chewable 81 milliGRAM(s) Oral daily  atorvastatin 80 milliGRAM(s) Oral at bedtime  budesonide 160 MICROgram(s)/formoterol 4.5 MICROgram(s) Inhaler 2 Puff(s) Inhalation two times a day  calcium carbonate    500 mG (Tums) Chewable 1 Tablet(s) Chew two times a day  cholecalciferol 1000 Unit(s) Oral daily  dextrose 5%. 1000 milliLiter(s) (50 mL/Hr) IV Continuous <Continuous>  epoetin beverley-epbx (RETACRIT) Injectable 80174 Unit(s) IV Push <User Schedule>  ferrous    sulfate 325 milliGRAM(s) Oral daily  folic acid 1 milliGRAM(s) Oral daily  furosemide    Tablet 80 milliGRAM(s) Oral daily  hydrALAZINE 25 milliGRAM(s) Oral three times a day  insulin glargine Injectable (LANTUS) 4 Unit(s) SubCutaneous at bedtime  insulin lispro (ADMELOG) corrective regimen sliding scale   SubCutaneous three times a day before meals  insulin lispro (ADMELOG) corrective regimen sliding scale   SubCutaneous at bedtime  insulin lispro Injectable (ADMELOG) 1 Unit(s) SubCutaneous three times a day before meals  latanoprost 0.005% Ophthalmic Solution 1 Drop(s) Both EYES at bedtime  levothyroxine 25 MICROGram(s) Oral daily  metoclopramide 10 milliGRAM(s) Oral three times a day  metolazone 10 milliGRAM(s) Oral daily  metoprolol succinate ER 50 milliGRAM(s) Oral daily  Nephro-luann 1 Tablet(s) Oral daily  pantoprazole    Tablet 40 milliGRAM(s) Oral two times a day  sevelamer carbonate 800 milliGRAM(s) Oral three times a day with meals  sodium zirconium cyclosilicate 10 Gram(s) Oral <User Schedule>  sucralfate suspension 1 Gram(s) Oral four times a day  tiotropium 2.5 MICROgram(s) Inhaler 2 Puff(s) Inhalation daily      MEDICATIONS  (PRN):  acetaminophen     Tablet .. 650 milliGRAM(s) Oral every 6 hours PRN Temp greater or equal to 38C (100.4F), Mild Pain (1 - 3)  dextrose Oral Gel 15 Gram(s) Oral once PRN Blood Glucose LESS THAN 70 milliGRAM(s)/deciliter  melatonin 3 milliGRAM(s) Oral at bedtime PRN Insomnia  ondansetron    Tablet 4 milliGRAM(s) Oral every 8 hours PRN Nausea and/or Vomiting  oxycodone    5 mG/acetaminophen 325 mG 1 Tablet(s) Oral every 6 hours PRN for severe pain        REVIEW OF SYSTEMS:                           ALL ROS DONE [ X   ]      CONSTITUTIONAL:  LETHARGIC [   ], FEVER [   ], UNRESPONSIVE [   ]  CVS:  CP  [   ], SOB, [   ], PALPITATIONS [   ], DIZZYNESS [   ]  RS: COUGH [   ], SPUTUM [   ]  GI: ABDOMINAL PAIN [   ], NAUSEA [   ], VOMITINGS [   ], DIARRHEA [   ], CONSTIPATION [   ]  :  DYSURIA [   ], NOCTURIA [   ], INCREASED FREQUENCY [   ], DRIBLING [   ],  SKELETAL: PAINFUL JOINTS [   ], SWOLLEN JOINTS [   ], NECK ACHE [   ], LOW BACK ACHE [   ],  SKIN : ULCERS [   ], RASH [   ], ITCHING [   ]  CNS: HEAD ACHE [   ], DOUBLE VISION [   ], BLURRED VISION [   ], AMS / CONFUSION [   ], SEIZURES [   ], WEAKNESS [   ],TINGLING / NUMBNESS [   ]        PHYSICAL EXAMINATION:    GENERAL APPEARANCE: NO DISTRESS  HEENT:  NO PALLOR, NO  JVD,  NO   NODES, NECK SUPPLE  CVS: S1 +, S2 +,   RS: AEEB,  OCCASIONAL  RALES +,   NO RONCHI  ABD: SOFT, NT, NO, BS +  EXT: NO PE  SKIN: WARM,   SKELETAL:  ROM ACCEPTABLE  CNS:  AAO X 2-3   , OLD  DEFICITS        RADIOLOGY :          ASSESSMENT :     Anemia    No pertinent past medical history    Hypertension    Adrenal insufficiency    CKD (chronic kidney disease)    Anemia    Glaucoma    Coronary artery disease    HLD (hyperlipidemia)    Peripheral vascular disease    Spinal stenosis of lumbosacral region    Hyperparathyroidism    Diabetes mellitus    Diabetic neuropathy    Contracture of hand    Osteoarthritis    Osteoporosis    Vision loss of left eye    ESRD on hemodialysis    Cataract    BPH (benign prostatic hyperplasia)    UTI (urinary tract infection)    Bladder mass    H/O hematuria    Osteoporosis    Vision loss of right eye    Depression    Chronic GERD    Osteomyelitis of vertebra    CHF (congestive heart failure)    No significant past surgical history    Below knee amputation status, left    History of right cataract extraction    History of left cataract extraction    S/P arteriovenous (AV) fistula creation    H/O hematuria    H/O transurethral destruction of bladder lesion    History of excision of mass        PLAN:  HPI:  62 year old male w/ PMHx ESRD on dialysis (TThS), DM2, CAD s/p MI, CHF, glaucoma, legally blind, HTN, orthostatic hypotension, hyperparathyroid, hyperkalemia, LS spinal stenosis, osteoarthritis, osteoporosis, PAD, hx of osteomyelitis of thoracic vertebrae, remote hx of crack-cocaine use disorder, PSH of BKA of L was sent from facility for anemia. HGB noted to be 5.1 on outpatient labs. Pt agitated that he is in the hospital, does not offer any more history. Pt denies any bleeding or melena. Denies any other acute complaints including fever/chills, headache, dizziness, chest pain, palpitations, cough, SOB, n/v/d/c, dysuria or leg swelling.   Of note: pt was recently discharged from [facility] for COPD exacerbation, was noted to have an episode of hematemesis at the time but declined further intervention at the time.  (27 May 2023 14:51)      # DC PLAN BACK TO [facility]    # ANEMIA DUE TO GI BLEED, RECURRENT EROSIVE ESOPHAGITIS - S/P PRBC 2 UNITS  # NO NEED FOR FURTHER GI W/UP - PATIENT HAD EXTENSIVE WORK UP IN RECENT PAST FOR THE SAME     # ESRD ON HD     GI & DVT PROPHYLAXIS     DR. COLIN MAC
Patient is a 62y old  Male who presents with a chief complaint of Anemia (28 May 2023 12:45)    MEDICATIONS:    MEDICATIONS  (STANDING):  albuterol/ipratropium for Nebulization 3 milliLiter(s) Nebulizer every 6 hours  ALPRAZolam 0.25 milliGRAM(s) Oral at bedtime  amLODIPine   Tablet 10 milliGRAM(s) Oral daily  aspirin  chewable 81 milliGRAM(s) Oral daily  atorvastatin 80 milliGRAM(s) Oral at bedtime  budesonide 160 MICROgram(s)/formoterol 4.5 MICROgram(s) Inhaler 2 Puff(s) Inhalation two times a day  calcium carbonate    500 mG (Tums) Chewable 1 Tablet(s) Chew two times a day  cholecalciferol 1000 Unit(s) Oral daily  dextrose 5%. 1000 milliLiter(s) (50 mL/Hr) IV Continuous <Continuous>  epoetin beverley-epbx (RETACRIT) Injectable 32738 Unit(s) IV Push <User Schedule>  ferrous    sulfate 325 milliGRAM(s) Oral daily  folic acid 1 milliGRAM(s) Oral daily  furosemide    Tablet 80 milliGRAM(s) Oral daily  hydrALAZINE 25 milliGRAM(s) Oral three times a day  insulin glargine Injectable (LANTUS) 4 Unit(s) SubCutaneous at bedtime  insulin lispro (ADMELOG) corrective regimen sliding scale   SubCutaneous at bedtime  insulin lispro (ADMELOG) corrective regimen sliding scale   SubCutaneous three times a day before meals  insulin lispro Injectable (ADMELOG) 1 Unit(s) SubCutaneous three times a day before meals  latanoprost 0.005% Ophthalmic Solution 1 Drop(s) Both EYES at bedtime  levothyroxine 25 MICROGram(s) Oral daily  metoclopramide 10 milliGRAM(s) Oral three times a day  metolazone 10 milliGRAM(s) Oral daily  metoprolol succinate ER 50 milliGRAM(s) Oral daily  Nephro-luann 1 Tablet(s) Oral daily  pantoprazole    Tablet 40 milliGRAM(s) Oral two times a day  sevelamer carbonate 800 milliGRAM(s) Oral three times a day with meals  sodium zirconium cyclosilicate 10 Gram(s) Oral <User Schedule>  sucralfate suspension 1 Gram(s) Oral four times a day  tiotropium 2.5 MICROgram(s) Inhaler 2 Puff(s) Inhalation daily    Patient is a 62y old  Male who presents with a chief complaint of Anemia (28 May 2023 12:45)    PATIENT IS SEEN AND EXAMINED IN MEDICAL FLOOR.    SULTANA [    ]    JEREMY [   ]      GT [   ]    ALLERGIES:  fish (Rash)  No Known Drug Allergies  liver (Anaphylaxis)      Daily     Daily Weight in k.5 (27 May 2023 18:20)    VITALS:    Vital Signs Last 24 Hrs  T(C): 37.6 (28 May 2023 13:26), Max: 38 (28 May 2023 09:44)  T(F): 99.7 (28 May 2023 13:26), Max: 100.4 (28 May 2023 09:44)  HR: 99 (28 May 2023 13:26) (87 - 99)  BP: 144/75 (28 May 2023 13:26) (144/75 - 163/79)  BP(mean): --  RR: 18 (28 May 2023 13:26) (17 - 18)  SpO2: 93% (28 May 2023 13:26) (88% - 100%)    Parameters below as of 28 May 2023 13:26  Patient On (Oxygen Delivery Method): mask, simple face  O2 Flow (L/min): 3      LABS:    CBC Full  -  ( 28 May 2023 14:07 )  WBC Count : 4.29 K/uL  RBC Count : 2.99 M/uL  Hemoglobin : 8.9 g/dL  Hematocrit : 28.2 %  Platelet Count - Automated : 128 K/uL  Mean Cell Volume : 94.3 fl  Mean Cell Hemoglobin : 29.8 pg  Mean Cell Hemoglobin Concentration : 31.6 gm/dL  Auto Neutrophil # : x  Auto Lymphocyte # : x  Auto Monocyte # : x  Auto Eosinophil # : x  Auto Basophil # : x  Auto Neutrophil % : x  Auto Lymphocyte % : x  Auto Monocyte % : x  Auto Eosinophil % : x  Auto Basophil % : x      05-27    138  |  99  |  26<H>  ----------------------------<  193<H>  3.8   |  29  |  8.56<H>    Ca    8.4      27 May 2023 11:34    TPro  5.5<L>  /  Alb  2.7<L>  /  TBili  0.3  /  DBili  x   /  AST  15  /  ALT  36  /  AlkPhos  77  -    CAPILLARY BLOOD GLUCOSE      POCT Blood Glucose.: 70 mg/dL (28 May 2023 11:27)  POCT Blood Glucose.: 149 mg/dL (28 May 2023 08:03)  POCT Blood Glucose.: 215 mg/dL (27 May 2023 22:27)        LIVER FUNCTIONS - ( 27 May 2023 11:34 )  Alb: 2.7 g/dL / Pro: 5.5 g/dL / ALK PHOS: 77 U/L / ALT: 36 U/L DA / AST: 15 U/L / GGT: x           Creatinine Trend: 8.56<--  I&O's Summary    27 May 2023 07:01  -  28 May 2023 07:00  --------------------------------------------------------  IN: 850 mL / OUT: 2586 mL / NET: -1736 mL                MEDICATIONS:    MEDICATIONS  (STANDING):  albuterol/ipratropium for Nebulization 3 milliLiter(s) Nebulizer every 6 hours  ALPRAZolam 0.25 milliGRAM(s) Oral at bedtime  amLODIPine   Tablet 10 milliGRAM(s) Oral daily  aspirin  chewable 81 milliGRAM(s) Oral daily  atorvastatin 80 milliGRAM(s) Oral at bedtime  budesonide 160 MICROgram(s)/formoterol 4.5 MICROgram(s) Inhaler 2 Puff(s) Inhalation two times a day  calcium carbonate    500 mG (Tums) Chewable 1 Tablet(s) Chew two times a day  cholecalciferol 1000 Unit(s) Oral daily  dextrose 5%. 1000 milliLiter(s) (50 mL/Hr) IV Continuous <Continuous>  epoetin beverley-epbx (RETACRIT) Injectable 42864 Unit(s) IV Push <User Schedule>  ferrous    sulfate 325 milliGRAM(s) Oral daily  folic acid 1 milliGRAM(s) Oral daily  furosemide    Tablet 80 milliGRAM(s) Oral daily  hydrALAZINE 25 milliGRAM(s) Oral three times a day  insulin glargine Injectable (LANTUS) 4 Unit(s) SubCutaneous at bedtime  insulin lispro (ADMELOG) corrective regimen sliding scale   SubCutaneous at bedtime  insulin lispro (ADMELOG) corrective regimen sliding scale   SubCutaneous three times a day before meals  insulin lispro Injectable (ADMELOG) 1 Unit(s) SubCutaneous three times a day before meals  latanoprost 0.005% Ophthalmic Solution 1 Drop(s) Both EYES at bedtime  levothyroxine 25 MICROGram(s) Oral daily  metoclopramide 10 milliGRAM(s) Oral three times a day  metolazone 10 milliGRAM(s) Oral daily  metoprolol succinate ER 50 milliGRAM(s) Oral daily  Nephro-luann 1 Tablet(s) Oral daily  pantoprazole    Tablet 40 milliGRAM(s) Oral two times a day  sevelamer carbonate 800 milliGRAM(s) Oral three times a day with meals  sodium zirconium cyclosilicate 10 Gram(s) Oral <User Schedule>  sucralfate suspension 1 Gram(s) Oral four times a day  tiotropium 2.5 MICROgram(s) Inhaler 2 Puff(s) Inhalation daily      MEDICATIONS  (PRN):  acetaminophen     Tablet .. 650 milliGRAM(s) Oral every 6 hours PRN Temp greater or equal to 38C (100.4F), Mild Pain (1 - 3)  dextrose Oral Gel 15 Gram(s) Oral once PRN Blood Glucose LESS THAN 70 milliGRAM(s)/deciliter  melatonin 3 milliGRAM(s) Oral at bedtime PRN Insomnia  ondansetron Injectable 4 milliGRAM(s) IV Push every 8 hours PRN Nausea and/or Vomiting  oxycodone    5 mG/acetaminophen 325 mG 1 Tablet(s) Oral every 6 hours PRN for severe pain        REVIEW OF SYSTEMS:                           ALL ROS DONE [ X   ]      CONSTITUTIONAL:  LETHARGIC [   ], FEVER [   ], UNRESPONSIVE [   ]  CVS:  CP  [   ], SOB, [   ], PALPITATIONS [   ], DIZZYNESS [   ]  RS: COUGH [   ], SPUTUM [   ]  GI: ABDOMINAL PAIN [   ], NAUSEA [   ], VOMITINGS [   ], DIARRHEA [   ], CONSTIPATION [   ]  :  DYSURIA [   ], NOCTURIA [   ], INCREASED FREQUENCY [   ], DRIBLING [   ],  SKELETAL: PAINFUL JOINTS [   ], SWOLLEN JOINTS [   ], NECK ACHE [   ], LOW BACK ACHE [   ],  SKIN : ULCERS [   ], RASH [   ], ITCHING [   ]  CNS: HEAD ACHE [   ], DOUBLE VISION [   ], BLURRED VISION [   ], AMS / CONFUSION [   ], SEIZURES [   ], WEAKNESS [   ],TINGLING / NUMBNESS [   ]      PHYSICAL EXAMINATION:    GENERAL APPEARANCE: NO DISTRESS  HEENT:  NO PALLOR, NO  JVD,  NO   NODES, NECK SUPPLE  CVS: S1 +, S2 +,   RS: AEEB,  OCCASIONAL  RALES +,   NO RONCHI  ABD: SOFT, NT, NO, BS +  EXT: NO PE  SKIN: WARM,   SKELETAL:  ROM ACCEPTABLE  CNS:  AAO X 2-3   , OLD  DEFICITS        RADIOLOGY :          ASSESSMENT :     Anemia    No pertinent past medical history    Hypertension    Adrenal insufficiency    CKD (chronic kidney disease)    Anemia    Glaucoma    Coronary artery disease    HLD (hyperlipidemia)    Peripheral vascular disease    Spinal stenosis of lumbosacral region    Hyperparathyroidism    Diabetes mellitus    Diabetic neuropathy    Contracture of hand    Osteoarthritis    Osteoporosis    Vision loss of left eye    ESRD on hemodialysis    Cataract    BPH (benign prostatic hyperplasia)    UTI (urinary tract infection)    Bladder mass    H/O hematuria    Osteoporosis    Vision loss of right eye    Depression    Chronic GERD    Osteomyelitis of vertebra    CHF (congestive heart failure)    No significant past surgical history    Below knee amputation status, left    History of right cataract extraction    History of left cataract extraction    S/P arteriovenous (AV) fistula creation    H/O hematuria    H/O transurethral destruction of bladder lesion    History of excision of mass        PLAN:  HPI:  62 year old male w/ PMHx ESRD on dialysis (TThS), DM2, CAD s/p MI, CHF, glaucoma, legally blind, HTN, orthostatic hypotension, hyperparathyroid, hyperkalemia, LS spinal stenosis, osteoarthritis, osteoporosis, PAD, hx of osteomyelitis of thoracic vertebrae, remote hx of crack-cocaine use disorder, PSH of BKA of L was sent from facility for anemia. HGB noted to be 5.1 on outpatient labs. Pt agitated that he is in the hospital, does not offer any more history. Pt denies any bleeding or melena. Denies any other acute complaints including fever/chills, headache, dizziness, chest pain, palpitations, cough, SOB, n/v/d/c, dysuria or leg swelling.   Of note: pt was recently discharged from [facility] for COPD exacerbation, was noted to have an episode of hematemesis at the time but declined further intervention at the time.  (27 May 2023 14:51)      # DC PLAN BACK TO [facility]    # ANEMIA DUE TO GI BLEED, RECURRENT EROSIVE ESOPHAGITIS - S/P PRBC 2 UNITS  # NO NEED FOR FURTHER GI W/UP - PATIENT HAD EXTENSIVE WORK UP IN RECENT PAST FOR THE SAME     # ESRD ON HD     GI & DVT PROPHYLAXIS     DR. COLIN MAC    
Patient is a 62y old  Male who presents with a chief complaint of Anemia (30 May 2023 10:23)    PATIENT IS SEEN AND EXAMINED IN MEDICAL FLOOR.  MARIIT [    ]    JEREMY [   ]      GT [   ]    ALLERGIES:  fish (Rash)  No Known Drug Allergies  liver (Anaphylaxis)      Daily     Daily Weight in k.8 (30 May 2023 17:02)    VITALS:    Vital Signs Last 24 Hrs  T(C): 36.8 (30 May 2023 20:52), Max: 37.4 (30 May 2023 13:05)  T(F): 98.2 (30 May 2023 20:52), Max: 99.3 (30 May 2023 13:05)  HR: 100 (30 May 2023 20:52) (95 - 104)  BP: 151/81 (30 May 2023 20:52) (139/73 - 163/73)  BP(mean): --  RR: 18 (30 May 2023 20:52) (16 - 18)  SpO2: 94% (30 May 2023 20:52) (91% - 96%)    Parameters below as of 30 May 2023 20:52  Patient On (Oxygen Delivery Method): room air        LABS:    CBC Full  -  ( 30 May 2023 17:18 )  WBC Count : 3.46 K/uL  RBC Count : 2.54 M/uL  Hemoglobin : 7.5 g/dL  Hematocrit : 24.1 %  Platelet Count - Automated : 109 K/uL  Mean Cell Volume : 94.9 fl  Mean Cell Hemoglobin : 29.5 pg  Mean Cell Hemoglobin Concentration : 31.1 gm/dL  Auto Neutrophil # : x  Auto Lymphocyte # : x  Auto Monocyte # : x  Auto Eosinophil # : x  Auto Basophil # : x  Auto Neutrophil % : x  Auto Lymphocyte % : x  Auto Monocyte % : x  Auto Eosinophil % : x  Auto Basophil % : x      05-30    136  |  100  |  44<H>  ----------------------------<  268<H>  4.0   |  28  |  10.80<H>    Ca    8.1<L>      30 May 2023 17:18      CAPILLARY BLOOD GLUCOSE      POCT Blood Glucose.: 206 mg/dL (30 May 2023 16:40)  POCT Blood Glucose.: 86 mg/dL (30 May 2023 11:13)  POCT Blood Glucose.: 184 mg/dL (30 May 2023 07:37)          Creatinine Trend: 10.80<--, 8.56<--  I&O's Summary          .Blood Blood-Peripheral  - @ 14:07   No growth to date.  --  --          MEDICATIONS:    MEDICATIONS  (STANDING):  albuterol/ipratropium for Nebulization 3 milliLiter(s) Nebulizer every 6 hours  ALPRAZolam 0.25 milliGRAM(s) Oral at bedtime  amLODIPine   Tablet 10 milliGRAM(s) Oral daily  aspirin  chewable 81 milliGRAM(s) Oral daily  atorvastatin 80 milliGRAM(s) Oral at bedtime  budesonide 160 MICROgram(s)/formoterol 4.5 MICROgram(s) Inhaler 2 Puff(s) Inhalation two times a day  calcium carbonate    500 mG (Tums) Chewable 1 Tablet(s) Chew two times a day  chlorhexidine 2% Cloths 1 Application(s) Topical <User Schedule>  cholecalciferol 1000 Unit(s) Oral daily  dextrose 5%. 1000 milliLiter(s) (50 mL/Hr) IV Continuous <Continuous>  epoetin beverley-epbx (RETACRIT) Injectable 17878 Unit(s) IV Push <User Schedule>  ferrous    sulfate 325 milliGRAM(s) Oral daily  folic acid 1 milliGRAM(s) Oral daily  furosemide    Tablet 80 milliGRAM(s) Oral daily  hydrALAZINE 25 milliGRAM(s) Oral three times a day  insulin glargine Injectable (LANTUS) 4 Unit(s) SubCutaneous at bedtime  insulin lispro (ADMELOG) corrective regimen sliding scale   SubCutaneous at bedtime  insulin lispro (ADMELOG) corrective regimen sliding scale   SubCutaneous three times a day before meals  insulin lispro Injectable (ADMELOG) 1 Unit(s) SubCutaneous three times a day before meals  latanoprost 0.005% Ophthalmic Solution 1 Drop(s) Both EYES at bedtime  levothyroxine 25 MICROGram(s) Oral daily  metoclopramide 10 milliGRAM(s) Oral three times a day  metolazone 10 milliGRAM(s) Oral daily  metoprolol succinate ER 50 milliGRAM(s) Oral daily  Nephro-ulann 1 Tablet(s) Oral daily  pantoprazole    Tablet 40 milliGRAM(s) Oral two times a day  sevelamer carbonate 800 milliGRAM(s) Oral three times a day with meals  sodium zirconium cyclosilicate 10 Gram(s) Oral <User Schedule>  sucralfate suspension 1 Gram(s) Oral four times a day  tiotropium 2.5 MICROgram(s) Inhaler 2 Puff(s) Inhalation daily      MEDICATIONS  (PRN):  acetaminophen     Tablet .. 650 milliGRAM(s) Oral every 6 hours PRN Temp greater or equal to 38C (100.4F), Mild Pain (1 - 3)  dextrose Oral Gel 15 Gram(s) Oral once PRN Blood Glucose LESS THAN 70 milliGRAM(s)/deciliter  melatonin 3 milliGRAM(s) Oral at bedtime PRN Insomnia  ondansetron    Tablet 4 milliGRAM(s) Oral every 8 hours PRN Nausea and/or Vomiting  oxycodone    5 mG/acetaminophen 325 mG 1 Tablet(s) Oral every 6 hours PRN for severe pain      REVIEW OF SYSTEMS:                           ALL ROS DONE [ X   ]    CONSTITUTIONAL:  LETHARGIC [   ], FEVER [   ], UNRESPONSIVE [   ]  CVS:  CP  [   ], SOB, [   ], PALPITATIONS [   ], DIZZYNESS [   ]  RS: COUGH [   ], SPUTUM [   ]  GI: ABDOMINAL PAIN [   ], NAUSEA [   ], VOMITINGS [   ], DIARRHEA [   ], CONSTIPATION [   ]  :  DYSURIA [   ], NOCTURIA [   ], INCREASED FREQUENCY [   ], DRIBLING [   ],  SKELETAL: PAINFUL JOINTS [   ], SWOLLEN JOINTS [   ], NECK ACHE [   ], LOW BACK ACHE [   ],  SKIN : ULCERS [   ], RASH [   ], ITCHING [   ]  CNS: HEAD ACHE [   ], DOUBLE VISION [   ], BLURRED VISION [   ], AMS / CONFUSION [   ], SEIZURES [   ], WEAKNESS [   ],TINGLING / NUMBNESS [   ]      PHYSICAL EXAMINATION:    GENERAL APPEARANCE: NO DISTRESS  HEENT:  NO PALLOR, NO  JVD,  NO   NODES, NECK SUPPLE  CVS: S1 +, S2 +,   RS: AEEB,  OCCASIONAL  RALES +,   NO RONCHI  ABD: SOFT, NT, NO, BS +  EXT: NO PE  SKIN: WARM,   SKELETAL:  ROM ACCEPTABLE  CNS:  AAO X 2-3   , OLD  DEFICITS        RADIOLOGY :          ASSESSMENT :     Anemia    No pertinent past medical history    Hypertension    Adrenal insufficiency    CKD (chronic kidney disease)    Anemia    Glaucoma    Coronary artery disease    HLD (hyperlipidemia)    Peripheral vascular disease    Spinal stenosis of lumbosacral region    Hyperparathyroidism    Diabetes mellitus    Diabetic neuropathy    Contracture of hand    Osteoarthritis    Osteoporosis    Vision loss of left eye    ESRD on hemodialysis    Cataract    BPH (benign prostatic hyperplasia)    UTI (urinary tract infection)    Bladder mass    H/O hematuria    Osteoporosis    Vision loss of right eye    Depression    Chronic GERD    Osteomyelitis of vertebra    CHF (congestive heart failure)    No significant past surgical history    Below knee amputation status, left    History of right cataract extraction    History of left cataract extraction    S/P arteriovenous (AV) fistula creation    H/O hematuria    H/O transurethral destruction of bladder lesion    History of excision of mass        PLAN:  HPI:  62 year old male w/ PMHx ESRD on dialysis (TThS), DM2, CAD s/p MI, CHF, glaucoma, legally blind, HTN, orthostatic hypotension, hyperparathyroid, hyperkalemia, LS spinal stenosis, osteoarthritis, osteoporosis, PAD, hx of osteomyelitis of thoracic vertebrae, remote hx of crack-cocaine use disorder, PSH of BKA of L was sent from facility for anemia. HGB noted to be 5.1 on outpatient labs. Pt agitated that he is in the hospital, does not offer any more history. Pt denies any bleeding or melena. Denies any other acute complaints including fever/chills, headache, dizziness, chest pain, palpitations, cough, SOB, n/v/d/c, dysuria or leg swelling.   Of note: pt was recently discharged from [facility] for COPD exacerbation, was noted to have an episode of hematemesis at the time but declined further intervention at the time.  (27 May 2023 14:51)      # DC PLAN BACK TO [facility]    # ANEMIA DUE TO GI BLEED, RECURRENT EROSIVE ESOPHAGITIS, IN THE SETTING OF GASTROPARESIS - S/P PRBC 2 UNITS  # DEFER FURTHER GI W/UP TO OUTPATIENT F/U - PATIENT HAD EXTENSIVE WORK UP IN RECENT PAST FOR THE SAME     # ESRD ON HD     GI & DVT PROPHYLAXIS   
[facility] : Progress Note :: [phone], (office [phone]),   Dr Mosher / Dr Washington / Dr Young / Dr Doll / Dr Varsha TURCIOS / Dr Tello / Dr Garcia / Dr Larry qiu  _____________________________________________________________________________________________  had HD yesterday  refusing post transfusion labs       fish (Rash)  No Known Drug Allergies  liver (Anaphylaxis)    Hospital Medications:   MEDICATIONS  (STANDING):  albuterol/ipratropium for Nebulization 3 milliLiter(s) Nebulizer every 6 hours  ALPRAZolam 0.25 milliGRAM(s) Oral at bedtime  amLODIPine   Tablet 10 milliGRAM(s) Oral daily  aspirin  chewable 81 milliGRAM(s) Oral daily  atorvastatin 80 milliGRAM(s) Oral at bedtime  budesonide 160 MICROgram(s)/formoterol 4.5 MICROgram(s) Inhaler 2 Puff(s) Inhalation two times a day  calcium carbonate    500 mG (Tums) Chewable 1 Tablet(s) Chew two times a day  cholecalciferol 1000 Unit(s) Oral daily  dextrose 5%. 1000 milliLiter(s) (50 mL/Hr) IV Continuous <Continuous>  epoetin beverley-epbx (RETACRIT) Injectable 29544 Unit(s) IV Push <User Schedule>  ferrous    sulfate 325 milliGRAM(s) Oral daily  folic acid 1 milliGRAM(s) Oral daily  furosemide    Tablet 80 milliGRAM(s) Oral daily  hydrALAZINE 25 milliGRAM(s) Oral three times a day  insulin glargine Injectable (LANTUS) 4 Unit(s) SubCutaneous at bedtime  insulin lispro (ADMELOG) corrective regimen sliding scale   SubCutaneous at bedtime  insulin lispro (ADMELOG) corrective regimen sliding scale   SubCutaneous three times a day before meals  insulin lispro Injectable (ADMELOG) 1 Unit(s) SubCutaneous three times a day before meals  latanoprost 0.005% Ophthalmic Solution 1 Drop(s) Both EYES at bedtime  levothyroxine 25 MICROGram(s) Oral daily  metoclopramide 10 milliGRAM(s) Oral three times a day  metolazone 10 milliGRAM(s) Oral daily  metoprolol succinate ER 50 milliGRAM(s) Oral daily  Nephro-luann 1 Tablet(s) Oral daily  pantoprazole    Tablet 40 milliGRAM(s) Oral two times a day  sodium zirconium cyclosilicate 10 Gram(s) Oral <User Schedule>  sucralfate suspension 1 Gram(s) Oral four times a day  tiotropium 2.5 MICROgram(s) Inhaler 2 Puff(s) Inhalation daily    REVI  VITALS:  T(F): 100.4 (05-28-23 @ 09:44), Max: 100.4 (05-28-23 @ 09:44)  HR: 98 (05-28-23 @ 09:44)  BP: 163/79 (05-28-23 @ 09:44)  RR: 17 (05-28-23 @ 09:44)  SpO2: 95% (05-28-23 @ 11:34)  Wt(kg): --    05-27 @ 07:01  -  05-28 @ 07:00  --------------------------------------------------------  IN: 850 mL / OUT: 2586 mL / NET: -1736 mL        PHYSICAL EXAM:  Constitutional: NAD  HEENT: anicteric sclera, oropharynx clear,   Neck: No JVD  Respiratory: CTAB, no wheezes, rales or rhonchi  Cardiovascular: S1, S2, RRR  Gastrointestinal: BS+, soft, NT/ND  Extremities: No peripheral edema  Neurological: A/O x 3,   Vascular Access: AVF     LABS:  05-27    138  |  99  |  26<H>  ----------------------------<  193<H>  3.8   |  29  |  8.56<H>    Ca    8.4      27 May 2023 11:34    TPro  5.5<L>  /  Alb  2.7<L>  /  TBili  0.3  /  DBili      /  AST  15  /  ALT  36  /  AlkPhos  77  05-27    Creatinine Trend: 8.56 <--                        5.1    3.20  )-----------( 96       ( 27 May 2023 11:34 )             17.3     Urine Studies:      RADIOLOGY & ADDITIONAL STUDIES:

## 2023-05-30 NOTE — DISCHARGE NOTE PROVIDER - CARE PROVIDER_API CALL
Felicia De La Torre  Internal Medicine  [address] [place] [address]  Phone: [phone]  Fax: [phone]  Follow Up Time: 1 week    Marcos Mosher  Internal Medicine  [address] [place] [address]  Phone: [phone]  Fax: [phone]  Follow Up Time: 1-3 days

## 2023-05-30 NOTE — DISCHARGE NOTE PROVIDER - NSDCMRMEDTOKEN_GEN_ALL_CORE_FT
acetaminophen 325 mg oral tablet: 2 tab(s) orally every 6 hours As needed Temp greater or equal to 38C (100.4F), Mild Pain (1 - 3)  ALPRAZolam 0.25 mg oral tablet: 1 tab(s) orally once a day (at bedtime)  aspirin 81 mg oral tablet: 1 tab(s) orally once a day  budesonide-formoterol 160 mcg-4.5 mcg/inh inhalation aerosol: 2 puff(s) inhaled 2 times a day  calcium carbonate 500 mg (200 mg elemental calcium) oral tablet, chewable: 1 tab(s) orally 2 times a day  cholecalciferol oral tablet: 1000 unit(s) orally once a day  Crestor 40 mg oral tablet: 1 tab(s) orally once a day (at bedtime)  cyanocobalamin 1000 mcg oral tablet: 1 tab(s) orally once a day  epoetin beverley: 4000 unit(s) intravenous Tuesday, Thursday, Saturday  ferrous sulfate 325 mg (65 mg elemental iron) oral tablet: 1 tab(s) orally once a day  folic acid 1 mg oral tablet: 1 tab(s) orally once a day  furosemide 80 mg oral tablet: 1 tab(s) orally once a day  hydrALAZINE 25 mg oral tablet: 1 tab(s) orally 3 times a day  insulin glargine 100 units/mL subcutaneous solution: 4 unit(s) subcutaneous once a day (at bedtime)  insulin lispro 100 units/mL injectable solution: 1 unit(s) injectable 3 times a day (before meals)  1 Unit(s) if Glucose 151 - 200  2 Unit(s) if Glucose 201 - 250  3 Unit(s) if Glucose 251 - 300  4 Unit(s) if Glucose 301 - 350  5 Unit(s) if Glucose 351 - 400  6 Unit(s) if Glucose Greater Than 400  Subcutaneous three times a day before meals     insulin lispro 100 units/mL injectable solution: 0 unit(s) injectable once a day (at bedtime)  0 Unit(s) if Glucose 0 - 250  1 Unit(s) if Glucose 251 - 300  2 Unit(s) if Glucose 301 - 350  3 Unit(s) if Glucose 351 - 400  4 Unit(s) if Glucose Greater Than 400  Subcutaneous at bedtime     ipratropium-albuterol 0.5 mg-2.5 mg/3 mL inhalation solution: 3 milliliter(s) inhaled every 6 hours  latanoprost 0.005% ophthalmic solution: 1 drop(s) to each affected eye once a day (at bedtime)  levothyroxine 25 mcg (0.025 mg) oral tablet: 1 tab(s) orally once a day  lidocaine 4% patch: 1 patch transdermal once a day  Lokelma 10 g oral powder for reconstitution: 1 packet(s) orally once a day every Sun/ Mon/ Wed/ Fri  metoclopramide 10 mg oral tablet: 1 tab(s) orally 3 times a day  metOLazone 10 mg oral tablet: 1 tab(s) orally once a day  metoprolol succinate 50 mg oral tablet, extended release: 1 tab(s) orally once a day  Norvasc 10 mg oral tablet: 1 tab(s) orally once a day  ondansetron 2 mg/mL injectable solution: 4 milligram(s) injectable every 8 hours, As Neededfor nausea and/or vomiting   pantoprazole 40 mg oral delayed release tablet: 1 tab(s) orally 2 times a day  Percocet 5 mg-325 mg oral tablet: 1 tab(s) orally 4 times a day as needed for  severe pain  Polina-Dior oral tablet: 1 tab(s) orally once a day  sevelamer carbonate 800 mg oral tablet: 1 tab(s) orally 3 times a day (with meals)  sucralfate 1 g/10 mL oral suspension: 10 milliliter(s) orally 4 times a day  tiotropium 2.5 mcg/inh inhalation aerosol: 2 puff(s) inhaled once a day

## 2023-05-31 NOTE — DISCHARGE NOTE NURSING/CASE MANAGEMENT/SOCIAL WORK - PATIENT PORTAL LINK FT
You can access the FollowMyHealth Patient Portal offered by [facility] by registering at the following website: [email or web address]. By joining [facility]’s FollowMyHealth portal, you will also be able to view your health information using other applications (apps) compatible with our system.

## 2023-05-31 NOTE — DISCHARGE NOTE NURSING/CASE MANAGEMENT/SOCIAL WORK - NSDCPEFALRISK_GEN_ALL_CORE
For information on Fall & Injury Prevention, visit: [email or web address] OR  [email or web address] OR  https://www.cdc.gov/steadi/patient.html

## 2023-05-31 NOTE — DISCHARGE NOTE NURSING/CASE MANAGEMENT/SOCIAL WORK - NSDCPEPT PROEDHF_GEN_ALL_CORE
Call primary care provider for follow up after discharge/Activities as tolerated/Low salt diet/Monitor weight daily/Report signs and symptoms to primary care provider
1

## 2023-06-12 NOTE — ED ADULT TRIAGE NOTE - CHIEF COMPLAINT QUOTE
Non-compliant with dialysis consistently, last dialysis Saturday for 1 hour.  Refusing care.  Denies sob.  Right arm shunt as per patient

## 2023-06-12 NOTE — ED PROVIDER NOTE - NSFOLLOWUPINSTRUCTIONS_ED_ALL_ED_FT
Dialysis  Dialysis is a procedure that is done when the kidneys have stopped working properly (kidney failure). It may also be done earlier if it may help improve symptoms. During dialysis, wastes, salt, and extra water are removed from the blood, and the levels of certain minerals in the blood are maintained.    Dialysis is done in sessions that are continued until the kidneys get better. If the kidneys cannot get better, such as in end-stage kidney disease, dialysis is continued for life or until you receive a new kidney from a donor (kidney transplant).    Types of treatments  There are two types of dialysis: hemodialysis and peritoneal dialysis. Both types of dialysis have advantages and disadvantages. Talk with your health care provider about which type of dialysis is best for you. Your lifestyle, preferences, and medical condition will be considered. In some cases, only one type of dialysis can be chosen.    Hemodialysis    Hemodialysis is when blood is filtered with a machine and a filter called a dialyzer. This treatment is usually done at a hospital or dialysis center three times per week. Visits last about 3–5 hours.  Before you start hemodialysis treatment, you will have minor surgery to create a vascular access point. Your vascular access will be where you'll connect to the dialyzer.  Home hemodialysis may also be an option for some patients. This means that hemodialysis can be performed at home with the help of a family member or caregiver who has had special training.  Peritoneal dialysis    Peritoneal dialysis is when the thin lining of the abdomen (peritoneum) and a fluid called dialysate are used to filter the blood.  Before you start peritoneal dialysis, a surgeon places a soft tube, called a catheter, in your belly.  You may do peritoneal dialysis at home or at almost any other location. After training, most people can perform both types of peritoneal dialysis on their own.  You may need up to five exchanges a day. Each exchange takes about 30–40 minutes. The amount of time the dialysate is in your body between exchanges is called a dwell. The dwell usually lasts 1.5–3 hours and can vary with each person. You may choose to do exchanges at night while you sleep, using a machine called a cycler.  What are the advantages?  Advantages of hemodialysis    It is done less often than peritoneal dialysis.  Someone else can do the dialysis for you.  If you go to a dialysis center:  Your health care provider can recognize any problems you may be having.  You can interact with others who are having dialysis. This can provide you with emotional support.  Advantages of peritoneal dialysis    It is less likely than hemodialysis to cause cramps and low blood pressure.  There are fewer eating restrictions than with hemodialysis.  You may do exchanges on your own wherever you are, including when you travel.  If you do automated peritoneal dialysis, you can set up your cycler every night.  What are the disadvantages  Disadvantages of hemodialysis    Generally, hemodialysis and peritoneal dialysis are safe treatments. However, problems may occur.  Cramps and low blood pressure. It may leave you feeling tired on the days you have the treatment.  The need to make weekly appointments and work around the center's schedule, if you go to a dialysis center.  The need to take extra care when traveling. If you usually get treatment in a dialysis center, you will need to arrange to visit a dialysis center near your destination. If you are having treatments at home, you will need to take the dialyzer with you when traveling.  More eating restrictions than with peritoneal dialysis.  Disadvantages of peritoneal dialysis    More frequent treatments than with hemodialysis.  The need to have a good use (dexterity) of your hands. You must also be able to lift bags.  The need to learn how to make your equipment free of germs (sterilization techniques). You will need to use these techniques every day to prevent infection.  What happens before the treatment?  Hemodialysis    Before starting hemodialysis, you will have minor surgery to create a site where blood can be removed from your body and returned to your body (vascular access). There are three types of vascular accesses:  Arteriovenous (AV) fistula. This type of access is created when an artery and a vein (usually in the arm) are connected during surgery. The AV fistula also has a large diameter that allows your blood to flow out and back into your body quickly. The goal is to allow high blood flow so that the largest amount of blood can pass through the dialyzer. The arteriovenous fistula usually takes 1–6 months to develop after surgery. It may last longer than the other types of vascular accesses and is less likely to become infected or cause blood clots.  Arteriovenous graft. If problems with your veins prevent you from having an AV fistula, you may need an AV graft instead. This type of access is created when an artery and a vein in the arm are connected during surgery with a tube. An arteriovenous graft can usually be used within 2–3 weeks of surgery.  A venous catheter. To create this type of access, a thin tube (catheter) is placed in a large vein in your neck, chest, or groin. A venous catheter can be used right away. It is usually used as a short-term access when dialysis needs to be started right away.  Peritoneal dialysis    Before starting peritoneal dialysis, you will have surgery to place a catheter in your abdomen. The catheter will be used to transfer a solution called dialysate to and from your abdomen.    What happens during the treatment?  Hemodialysis    During hemodialysis, blood will leave your body through your vascular access site. Blood will travel through a tube to the dialyzer, where it is filtered. The blood will then return to your body through a different tube.    Peritoneal dialysis          At the start of a dialysis session, your abdomen will be filled with dialysate solution. During dialysis, waste, salt, and extra water in the blood will pass through the peritoneum and into the dialysate solution. The dialysate solution will be drained from the body at the end of the dialysis session. The process of filling and draining the dialysate is called an exchange. Exchanges are repeated until you have used up all of the dialysate solution for that day.    Follow these instructions at home:  Eating and drinking    Make changes to your diet as told by your health care provider, such as:  Limiting your intake of foods that contain a lot of phosphorus and potassium.  Limiting your fluid and salt intake.  Add protein to your diet because dialysis removes protein.  Work with a diet and nutrition specialist (dietitian) to make a meal plan that can help improve your dialysis and your health and healthy ways to add calories to your diet because you may not have a good appetite.  Take vitamins made for people with kidney failure.  Activity    Rest as told by your health care provider. You may have less energy.  You may need to limit some physical activities when your belly is full of dialysis solution.  Return to your normal activities as told by your health care provider. Ask your health care provider what activities are safe for you.  General instructions    Try to adjust to the dialysis treatment, schedule, and diet. This can take some time. You may need to stop working and may not be able to do some of your normal activities.  You may feel anxious or depressed when starting dialysis. Over time, many people feel better overall because of dialysis. You may be able to return to work after making some changes, such as reducing work intensity.  Take over-the-counter and prescription medicines only as told by your health care provider.  Keep all follow-up visits. This is important.  Where to find more information  National Kidney Foundation: www.kidney.org  American Association of Kidney Patients: www.aakp.org  American Kidney Fund: www.kidneyfund.org  Summary  During dialysis, wastes, salt, and extra water are removed from the blood, and the levels of certain minerals in the blood are maintained. There are two types of dialysis: hemodialysis and peritoneal dialysis.  Hemodialysis is when the blood is filtered with a machine and a filter called a dialyzer.  Peritoneal dialysis is when the peritoneum is used as a filter. You may do peritoneal dialysis at home or at almost any other location.  Both types of dialysis have advantages and disadvantages. Talk with your health care provider about which type of dialysis is best for you.  This information is not intended to replace advice given to you by your health care provider. Make sure you discuss any questions you have with your health care provider.

## 2023-06-12 NOTE — ED ADULT NURSE NOTE - NSFALLRISKINTERV_ED_ALL_ED
Assistance OOB with selected safe patient handling equipment if applicable/Assistance with ambulation/Communicate fall risk and risk factors to all staff, patient, and family/Monitor gait and stability/Provide visual cue: yellow wristband, yellow gown, etc/Reinforce activity limits and safety measures with patient and family/Call bell, personal items and telephone in reach/Instruct patient to call for assistance before getting out of bed/chair/stretcher/Non-slip footwear applied when patient is off stretcher/[place] to call system/Physically safe environment - no spills, clutter or unnecessary equipment/Purposeful Proactive Rounding/Room/bathroom lighting operational, light cord in reach

## 2023-06-12 NOTE — ED PROVIDER NOTE - OBJECTIVE STATEMENT
62 y.o presenting for facial swelling. patient blind at baseline. endorses he was told he had swelling. denies n, v, cp, sob, difficulty breathing or speaking. ems state patient only got 1 hr of dialysis 3 days ago. patient state he completed diaylsis.

## 2023-06-12 NOTE — ED PROVIDER NOTE - PATIENT PORTAL LINK FT
You can access the FollowMyHealth Patient Portal offered by [facility] by registering at the following website: [email or web address]. By joining [facility]’s FollowMyHealth portal, you will also be able to view your health information using other applications (apps) compatible with our system.

## 2023-06-12 NOTE — ED PROVIDER NOTE - CLINICAL SUMMARY MEDICAL DECISION MAKING FREE TEXT BOX
Patient presenting for facial swelling. clinically no swelling noted. no airway compromise. no signs of infection. offered lab to assess electrolyte, offered imaging to for swelling and infection. patient declined. patient aox3, endorses understanding that he is at risk for life threatening emergency if no work up is done, patient aox3, have capacity, patient decision informed to dr abarca. clinically well. transportation order for return to facility. no indication for sedation for work up as patient have capacity to refuse

## 2023-06-20 NOTE — DIETITIAN INITIAL EVALUATION ADULT. - REASON FOR ADMISSION
Patient with 2 recent episodes of probable postmenopausal bleeding.  Exam is normal  Check pelvic ultrasound -consider EMB pending results   Hematemesis/ Hyperkalemia 2/2 to Missed HD session

## 2023-07-01 NOTE — ED ADULT NURSE NOTE - ED STAT RN HANDOFF DETAILS
Patient admitted to medicine in no acute distress unable to obtain IV access Dr. Esteban made aware. Patient to receive transfusion in dialysis. Patient endorsed to hold RN, to be transported via stretcher safety maintained.

## 2023-07-01 NOTE — H&P ADULT - ASSESSMENT
Patient is a 62 year old male w/ PMHx ESRD on dialysis (TThS), HFPEF (On home O2 3L), DM2, CAD s/p MI, CHF, glaucoma, legally blind, HTN, orthostatic hypotension, hyperparathyroid, hyperkalemia, LS spinal stenosis, osteoarthritis, osteoporosis, PAD, hx of osteomyelitis of thoracic vertebrae, remote hx of crack-cocaine use disorder, PSH of BKA of L was sent from facility for anemia. Patient was admitted for requiring transfusion for anemia.  Patient is a 62 year old male w/ PMHx ESRD on dialysis (TThS), HFPEF (On home O2 3L), DM2, CAD s/p MI, CHF, glaucoma, legally blind, HTN, orthostatic hypotension, hyperparathyroid, hyperkalemia, LS spinal stenosis, osteoarthritis, osteoporosis, PAD, hx of osteomyelitis of thoracic vertebrae, remote hx of crack-cocaine use disorder, PSH of BKA of L was sent from facility for anemia. Patient also complained of cough and CXR showed multifocal opacities. Patient was admitted for requiring transfusion for anemia, and pneumonia.

## 2023-07-01 NOTE — ED PROVIDER NOTE - OBJECTIVE STATEMENT
61 yo M pmh of ESRD (on HD T/Th/S, due for HD today), on home oxygen (3 L NC), anemia, blindness, CAD, LLE amputation  Sent in from [facility] for low Hgb  Pt denies acute complaints

## 2023-07-01 NOTE — ED ADULT NURSE NOTE - NSFALLHARMRISKINTERV_ED_ALL_ED
Assistance OOB with selected safe patient handling equipment if applicable/Assistance with ambulation/Communicate risk of Fall with Harm to all staff, patient, and family/Monitor gait and stability/Provide visual cue: red socks, yellow wristband, yellow gown, etc/Reinforce activity limits and safety measures with patient and family/Bed in lowest position, wheels locked, appropriate side rails in place/Call bell, personal items and telephone in reach/Instruct patient to call for assistance before getting out of bed/chair/stretcher/Non-slip footwear applied when patient is off stretcher/[place] to call system/Physically safe environment - no spills, clutter or unnecessary equipment/Purposeful Proactive Rounding/Room/bathroom lighting operational, light cord in reach

## 2023-07-01 NOTE — ED PROVIDER NOTE - PROGRESS NOTE DETAILS
Labs - Hgb 6.2 (consented for transfusion), ESRD  CXR - multifocal airspace opacities; R pleural effusion  Needs HD today  Accepted by Dr LISA De La Torre, endorsed to MAR

## 2023-07-01 NOTE — ED ADULT NURSE NOTE - OBJECTIVE STATEMENT
Patient present to ED from [facility] for low H/H as per facility s/p dialysis on thursday and was told low H/H, at time of arrival low oxygen saturation , venti 28% placed on patient. Patient non complaint with wearing oxygen MD aware

## 2023-07-01 NOTE — H&P ADULT - NSHPPHYSICALEXAM_GEN_ALL_CORE
PHYSICAL EXAM:  GENERAL: NAD, speaks in full sentences, no signs of respiratory distress  HEAD:  Atraumatic, Normocephalic  EYES: EOMI, PERRLA, conjunctiva and sclera clear  NECK: Supple  CHEST/LUNG: bilateral crackles on auscultation, no wheezing or rhonchi, No accessory muscles used  HEART: Regular rate and rhythm; No murmurs;   ABDOMEN: Soft, Nontender, Nondistended; Bowel sounds present; No guarding  EXTREMITIES:  2+ Peripheral Pulses, No cyanosis or edema  PSYCH: AAOx3  NEUROLOGY: non-focal  SKIN: No rashes or lesions

## 2023-07-01 NOTE — H&P ADULT - PROBLEM SELECTOR PLAN 1
patient P/w Hb of 6.2.. Denies any active signs of active bleed.   Likely in setting of CKD  Known history of esophagitis but refuses further work up.   Transfuse 1U PRBC intradialysis.   F/U post transfusion CBC.   Start IV PPI BID.   Transfuse if HB <7.  Monitor CBC.   Avoid NSAIDs.

## 2023-07-01 NOTE — H&P ADULT - PROBLEM SELECTOR PLAN 3
Echo Feb 23: EF 45%, D1DD.  On home O2 3L.   C/w lasix and metolazone. Likely 2/2 to being underdialysed.   C/w HD as per schedule.

## 2023-07-01 NOTE — H&P ADULT - PROBLEM SELECTOR PLAN 4
C/w home dose of metoprolol amlodipine, and hydralazine KENNEDI T, [place], [place]  Nephro consulted - Dr Mosher

## 2023-07-01 NOTE — H&P ADULT - PROBLEM SELECTOR PLAN 2
KENNEDI T, ST[place], [place]  Nephro consulted - Dr Mosher P/w mild cough.  CXR: Bilateral RIGHT greater than LEFT multifocal and diffuse airspace opacities and/or RIGHT effusion layering along posterior thoracic wall.  Start Ceftriaxone and azithromycin.   ID consulted - Dr Newby

## 2023-07-01 NOTE — H&P ADULT - NSHPSOCIALHISTORY_GEN_ALL_CORE
Patient refused to offer social history during this visit.   Documented former smoker and cocaine user in the past.

## 2023-07-01 NOTE — PATIENT PROFILE ADULT - SURGICAL SITE INCISION
Called Neurosurgery ([id number], Julius Barton on today) to the Resident [facility] ([id number]) this morning to notify with MRI results of [facility] and to ask if NSGY would like to follow inpatient or possible outpatient appointment. Will sign out to PM float resident to f/u if haven't received response before then.    -Abbi Johnson, PGY-1 no

## 2023-07-01 NOTE — H&P ADULT - HISTORY OF PRESENT ILLNESS
Patient is a 62 year old male w/ PMHx ESRD on dialysis (TThS), HFPEF (On home O2 3L), DM2, CAD s/p MI, CHF, glaucoma, legally blind, HTN, orthostatic hypotension, hyperparathyroid, hyperkalemia, LS spinal stenosis, osteoarthritis, osteoporosis, PAD, hx of osteomyelitis of thoracic vertebrae, remote hx of crack-cocaine use disorder, PSH of BKA of L was sent from facility for anemia. Patient is agitated that he is in the hospital, and refuses to offer more history. Patient reports that he does not have any complains and that all he wants is his blood and leave. Patient denies any weakness, fatigue, lightheadedness, dizziness, bright red blood per rectum, melena, hematochezia, hematemesis. Patient denies any other complains at this time but refuses to participate in a thorough ROS.     Of  note: Patient was admitted to hospital in May 2023 for similar complains.

## 2023-07-01 NOTE — CONSULT NOTE ADULT - SUBJECTIVE AND OBJECTIVE BOX
[facility] [place] [facility] - POLA Garcia / POLA Doll / AKSHAT Tello/ POLA Maddox/ POLA Young/ ELISA [place] / JARAD Mosher / FLORA Laboy  -------------------------------------------------------------------------------------------------------  The patient seen and examined today.  HPI:  61 yo M pmh of ESRD (on HD T/Th/S, due for HD today), on home oxygen (3 L NC), anemia, blindness, CAD, LLE amputation, sent from NH due to severe anemia. Patient asymptomatic, wants to leave.    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      Osteoarthritis      Vision loss of left eye  blind      ESRD on hemodialysis  T/Th/S      Cataract  both eyes - hx of sx done      BPH (benign prostatic hyperplasia)      UTI (urinary tract infection)  hx of      Bladder mass  hx of      H/O hematuria      Osteoporosis      Vision loss of right eye  decreased      Depression      Chronic GERD      Osteomyelitis of vertebra      CHF (congestive heart failure)      Below knee amputation status, left  2012- pt is wearing prostesis      History of right cataract extraction      History of left cataract extraction      S/P arteriovenous (AV) fistula creation  right arm brachiocephalic arteriovenous fistula on 11/08/2018      H/O hematuria  s/p bladder bx and fulguration 2/25/2020      H/O transurethral destruction of bladder lesion  2020      History of excision of mass  back mass on 03/31/2021        Allergies :- fish (Rash)  liver (Anaphylaxis)  No Known Drug Allergies    Home Medications Reviewed  Hospital Medications:   MEDICATIONS  (STANDING):    SOCIAL HISTORY:  Denies ETOh,Smoking,   FAMILY HISTORY:  Family history of cirrhosis of liver (Mother)    Family history of renal failure (Sibling)    Family history of hypertension (Sibling)    Family history of diabetes mellitus (Sibling)    History of substance abuse in sibling (Sibling)        REVIEW OF SYSTEMS: No complaints  CONSTITUTIONAL: No weakness, fevers or chills  EYES/ENT: No visual changes;  No vertigo or throat pain   NECK: No pain or stiffness  RESPIRATORY: No cough, wheezing, hemoptysis; No shortness of breath  CARDIOVASCULAR: No chest pain or palpitations.  GASTROINTESTINAL: No abdominal or epigastric pain. No nausea, vomiting, or hematemesis; No diarrhea or constipation. No melena or hematochezia.  GENITOURINARY: No dysuria, frequency, foamy urine, urinary urgency, incontinence or hematuria  NEUROLOGICAL: No numbness or weakness  SKIN: No itching, burning, rashes, or lesions   VASCULAR: No bilateral lower extremity edema.   All other review of systems is negative unless indicated above.    VITALS:  T(F): 98.8 (07-01-23 @ 11:30), Max: 99.1 (07-01-23 @ 10:10)  HR: 89 (07-01-23 @ 11:30)  BP: 150/76 (07-01-23 @ 11:30)  RR: 19 (07-01-23 @ 11:30)  SpO2: 89% (07-01-23 @ 11:30)  Wt(kg): --    Height (cm): 157.5 (07-01 @ 10:10)    PHYSICAL EXAM:  Constitutional: NAD  HEENT: anicteric sclera, oropharynx clear, MMM  Neck: supple.   Respiratory: Bilateral crackles  Cardiovascular: S1, S2, Regular, Murmur present.  Gastrointestinal: Bowel Sound present, soft, NT/ND  Extremities: LLE BKA, LLE edema +  Neurological: Alert and oriented x 3, no focal deficits  Psychiatric: Normal mood, normal affect  : No CVA tenderness. No [place].   Skin: No rashes    Data:  07-01    138  |  102  |  52<H>  ----------------------------<  131<H>  5.0   |  26  |  10.80<H>    Ca    9.0      01 Jul 2023 10:50    TPro  6.1  /  Alb  2.6<L>  /  TBili  0.4  /  DBili      /  AST  40  /  ALT  30  /  AlkPhos  83  07-01    Creatinine Trend: 10.80 <--                        6.2    3.47  )-----------( 104      ( 01 Jul 2023 10:50 )             20.7     Urine Studies:  Urinalysis Basic - ( 01 Jul 2023 10:50 )    Color:  / Appearance:  / SG:  / pH:   Gluc: 131 mg/dL / Ketone:   / Bili:  / Urobili:    Blood:  / Protein:  / Nitrite:    Leuk Esterase:  / RBC:  / WBC    Sq Epi:  / Non Sq Epi:  / Bacteria:

## 2023-07-01 NOTE — PATIENT PROFILE ADULT - FALL HARM RISK - HARM RISK INTERVENTIONS
Assistance with ambulation/Assistance OOB with selected safe patient handling equipment/Communicate Risk of Fall with Harm to all staff/Discuss with provider need for PT consult/Monitor gait and stability/Reinforce activity limits and safety measures with patient and family/Tailored Fall Risk Interventions/Visual Cue: Yellow wristband and red socks/Bed in lowest position, wheels locked, appropriate side rails in place/Call bell, personal items and telephone in reach/Instruct patient to call for assistance before getting out of bed or chair/Non-slip footwear when patient is out of bed/[place] to call system/Physically safe environment - no spills, clutter or unnecessary equipment/Purposeful Proactive Rounding/Room/bathroom lighting operational, light cord in reach

## 2023-07-02 NOTE — CONSULT NOTE ADULT - ASSESSMENT
61 yo M pmh of ESRD (on HD T/Th/S, due for HD today), on home oxygen (3 L NC), anemia, blindness, CAD, LLE amputation, sent from NH due to severe anemia.    ESRD on HD  Consent in chart  Access: [facility]  HD center: [facility]  Schedule TTS  Due for HD today, placed on schedule  Appears overloaded, might require Extra UF session on Monday, will reeval  renal diet  avoid nephrotoxins  daily BMP    Anemia of CKD  Last H/H 8.7 on 6/23 outpatient  start Epo 20k TIW  Transfuse to keep H/H >7  needs further anemia work up, denies melena but blind  CBC QD    HTN  UF as tolerated      Thank you for allowing me to participate in the care of your patient    For any question, call:  Cell # [phone]  Pager # [phone]  Callback # [phone]
Pneumonia ??  CHF      Plan - Cont Rocephin1 gm iv q24hrs   Cont Azithromycin 500mgs iv q24hrs  if being discharged tomorrow then can put on Ceftin 250mgs po BID x 4-5 days more.

## 2023-07-02 NOTE — DISCHARGE NOTE PROVIDER - NSDCFUSCHEDAPPT_GEN_ALL_CORE_FT
[facility]  VASCULAR [address]  Scheduled Appointment: 07/24/2023    Ambrocio Mason  [facility]  VASCULAR [address]  Scheduled Appointment: 07/24/2023    Carolyne Webster  [facility]  [place] [id number] [address]  Scheduled Appointment: 08/24/2023

## 2023-07-02 NOTE — DISCHARGE NOTE PROVIDER - NSDCCPCAREPLAN_GEN_ALL_CORE_FT
PRINCIPAL DISCHARGE DIAGNOSIS  Diagnosis: Symptomatic anemia  Assessment and Plan of Treatment: You were sent to the hospital for a hgb of 6.2. you recieved 2 units of pack red cells intradialysis. your blood count was monitored and it has improved.   Anemia is a low number of red blood cells or a low amount of hemoglobin in your red blood cells. The cause of your anemia is likley due to your end stage renal disease.  Symptoms to report, bleeding, palpitations, fatigue, pale skin, cold skin, dizziness. Take medications as ordered by PCP  Follow with your PCP.        SECONDARY DISCHARGE DIAGNOSES  Diagnosis: CAP (community acquired pneumonia)  Assessment and Plan of Treatment: You complained of cough. you had a chest xray which showed you have multifocal opacities in your lungs. you were treated for pneumonia and followed by an infection doctor.  Pneumonia is a lung infection that can cause a fever, cough, and trouble breathing.  If you were prescribed antibiotics, continue all antibiotics as ordered until complete.  Nutrition is important, eat small frequent meals.  Get lots of rest and drink fluids.  Call your health care provider upon arrival home from hospital and make a follow up appointment for one week.  If your cough worsens, you develop fever greater than 101', you have shaking chills, a fast heartbeat, trouble breathing and/or feel your are breathing much faster than usual, call your healthcare provider.  Make sure you wash your hands frequently.      Diagnosis: ESRD on hemodialysis  Assessment and Plan of Treatment:     Diagnosis: Hypertension  Assessment and Plan of Treatment: You have a history of high blood pressure. High blood pressure is a condition that puts you at risk for heart attack, stroke and kidney disease. Please continue to take your medications as prescribed. You can also help control your blood pressure by maintaining a healthy weight, eating a diet low in fat and rich in fruits and vegetables, reduce the amount of salt in your diet. Also, reduce alcohol and try to include some form of physical activity daily for at least 30 mins. Follow up with your medical doctor to establish long term blood pressure treatment goals.  Notify your doctor if you have any of the following symptoms:   Dizziness, Lightheadedness, Blurry vision, Headache, Chest pain, Shortness of breath      Diagnosis: Hyperlipidemia  Assessment and Plan of Treatment: You have a history of hyperlipidemia, which is when you have too much cholesterol in your blood. High amounts of cholesterol in your blood can put you at higher risks for heart attck, strokes and other health problems. Follow up with PCP for treatment goals, continue medication as prescribed, have liver function testing every 3 months as anti lipid medications can cause liver irritation, eat low fat meals, avoid red meat, butter, fried foods and cheese. Get daily exercise.      Diagnosis: Hypothyroidism  Assessment and Plan of Treatment: you do not make enough thyroid hormone  signs & symptoms of low levels of thyroid hormone - tired, getting cold easily, coarse or thin hair, constipation, shortness of breath, swelling, irregular periods  your doctor will do thyroid hormone blood tests at least once a year to monitor if medication dose is adequate  take your thyroid medicine as directed by your doctor & on empty stomach      Diagnosis: Chronic diastolic congestive heart failure  Assessment and Plan of Treatment: Weigh yourself daily.  If you gain 3lbs in 3 days, or 5lbs in a week call your Health Care Provider.  Do not eat or drink foods containing more than 2000mg of salt (sodium) in your diet every day.  Call your Health Care Provider if you have any swelling or increased swelling in your feet, ankles, and/or stomach.  Take all of your medication as directed.  If you become dizzy call your Health Care Provider.       PRINCIPAL DISCHARGE DIAGNOSIS  Diagnosis: Symptomatic anemia  Assessment and Plan of Treatment: You were sent to the hospital for a hgb of 6.2. you recieved 2 units of pack red cells intradialysis. your blood count was monitored and it has improved.   your anemia is likely due to your history of end stage renal disease  Anemia is a low number of red blood cells or a low amount of hemoglobin in your red blood cells. The cause of your anemia is likley due to your end stage renal disease.  Symptoms to report, bleeding, palpitations, fatigue, pale skin, cold skin, dizziness.   continue taking vitamin b12, folic acid and ferrous sulfate daily   please follow up with your primary care Dr. De La Torre to continue to monitor your blood count      SECONDARY DISCHARGE DIAGNOSES  Diagnosis: Acute on chronic respiratory failure with hypoxia  Assessment and Plan of Treatment: you were found to have pneumonia and possible fluid overload   you need to continue using your home oxygen at 3L via nasal cannula  you were given dialysis sessions to remove extra fluid from your body  you were also found to have worsening anemia which you were given blood transfusions to improve your blood count.    Diagnosis: CAP (community acquired pneumonia)  Assessment and Plan of Treatment: You complained of cough. you had a chest xray which showed you have multifocal opacities in your lungs. you were treated for pneumonia and followed by an infection doctor.  Pneumonia is a lung infection that can cause a fever, cough, and trouble breathing.  you were offered antibiotics in the hospital but you refused.   Nutrition is important, eat small frequent meals.  Get lots of rest and drink fluids.  Call your health care provider upon arrival home from hospital and make a follow up appointment for one week.  If your cough worsens, you develop fever greater than 101', you have shaking chills, a fast heartbeat, trouble breathing and/or feel your are breathing much faster than usual, call your healthcare provider.  Make sure you wash your hands frequently.      Diagnosis: ESRD on hemodialysis  Assessment and Plan of Treatment: Please continue your normal dialysis schedule tues/thursday/saturday  continue taking lokelma on your days off dialysis  you will also need epogen 20,000 units three times a week at dialysis to try to boost your blood count  you last had dialysis on 7/3/23  you will need to follow a renal diet is a diet aimed at keeping levels of fluids, electrolytes, and minerals balanced in the body in individuals with chronic kidney disease or who are on dialysis. Dietary changes may include the restriction of fluid intake, protein, and electrolytes including sodium, phosphorus, and potassium  please follow up with nephrologist Dr. Laboy      Diagnosis: Chronic diastolic congestive heart failure  Assessment and Plan of Treatment: continue taking metolazone 10 mg daily and lasix 80 mg daily, metoprolol succinate 50 mg daily      Diagnosis: COPD (chronic obstructive pulmonary disease)  Assessment and Plan of Treatment: continue using albuterol inhaler as needed for shortness of breath or wheezing  continue using symbicort 2 puffs twice a day to maintain your COPD from exacerbations    Diagnosis: Hypertension  Assessment and Plan of Treatment: continue taking hydralazine 25 mg three times a day, norvasc 10 mg daily, metoprolol succinate 50 mg daily, and lasix 80 mg daily    Diagnosis: Hyperlipidemia  Assessment and Plan of Treatment: continue taking crestor 40 mg at bedtime and aspirin 81 mg daily    Diagnosis: Hypothyroidism  Assessment and Plan of Treatment: continue taking synthroid 25 mcg daily      Diagnosis: Moderate protein-calorie malnutrition  Assessment and Plan of Treatment: you were found to have albumin level 2.6, continue nepro nutritional shakes daily     PRINCIPAL DISCHARGE DIAGNOSIS  Diagnosis: Symptomatic anemia  Assessment and Plan of Treatment: You were sent to the hospital for a hgb of 6.2. you recieved 2 units of pack red cells intradialysis. your blood count was monitored and it has improved.   your anemia is likely due to your history of end stage renal disease  Anemia is a low number of red blood cells or a low amount of hemoglobin in your red blood cells. The cause of your anemia is likley due to your end stage renal disease.  Symptoms to report, bleeding, palpitations, fatigue, pale skin, cold skin, dizziness.   continue taking vitamin b12, folic acid and ferrous sulfate daily   please follow up with your primary care Dr. De La Torre to continue to monitor your blood count      SECONDARY DISCHARGE DIAGNOSES  Diagnosis: Acute on chronic respiratory failure with hypoxia  Assessment and Plan of Treatment: you were found to have pneumonia and possible fluid overload   you need to continue using your home oxygen at 3L via nasal cannula  you were given dialysis sessions to remove extra fluid from your body  you were also found to have worsening anemia which you were given blood transfusions to improve your blood count.    Diagnosis: CAP (community acquired pneumonia)  Assessment and Plan of Treatment: You complained of cough. you had a chest xray which showed you have multifocal opacities in your lungs. you were treated for pneumonia and followed by an infection doctor.  Pneumonia is a lung infection that can cause a fever, cough, and trouble breathing.  you were offered antibiotics in the hospital but you refused.   Nutrition is important, eat small frequent meals.  Get lots of rest and drink fluids.  Call your health care provider upon arrival home from hospital and make a follow up appointment for one week.  If your cough worsens, you develop fever greater than 101', you have shaking chills, a fast heartbeat, trouble breathing and/or feel your are breathing much faster than usual, call your healthcare provider.  Make sure you wash your hands frequently.      Diagnosis: ESRD on hemodialysis  Assessment and Plan of Treatment: Please continue your normal dialysis schedule tues/thursday/saturday  continue taking lokelma on your days off dialysis  you will also need epogen 20,000 units three times a week at dialysis to try to boost your blood count  you last had dialysis on 7/3/23  you will need to follow a renal diet is a diet aimed at keeping levels of fluids, electrolytes, and minerals balanced in the body in individuals with chronic kidney disease or who are on dialysis. Dietary changes may include the restriction of fluid intake, protein, and electrolytes including sodium, phosphorus, and potassium  please follow up with nephrologist Dr. Laboy      Diagnosis: Chronic diastolic congestive heart failure  Assessment and Plan of Treatment: continue taking metolazone 10 mg daily and lasix 80 mg daily, metoprolol succinate 50 mg daily      Diagnosis: COPD (chronic obstructive pulmonary disease)  Assessment and Plan of Treatment: continue using albuterol inhaler as needed for shortness of breath or wheezing  continue using symbicort 2 puffs twice a day to maintain your COPD from exacerbations    Diagnosis: Hypertension  Assessment and Plan of Treatment: continue taking hydralazine 25 mg three times a day, norvasc 10 mg daily, metoprolol succinate 50 mg daily, and lasix 80 mg daily    Diagnosis: Hyperlipidemia  Assessment and Plan of Treatment: continue taking crestor 40 mg at bedtime and aspirin 81 mg daily    Diagnosis: Hypothyroidism  Assessment and Plan of Treatment: continue taking synthroid 25 mcg daily      Diagnosis: Diabetes mellitus  Assessment and Plan of Treatment: continue taking lantus 4 units at bedtime and insulin sliding scale with meals three times a day    Diagnosis: Diabetic neuropathy  Assessment and Plan of Treatment: continue taking percocet to manage your pain    Diagnosis: Moderate protein-calorie malnutrition  Assessment and Plan of Treatment: you were found to have albumin level 2.6, continue nepro nutritional shakes daily

## 2023-07-02 NOTE — DISCHARGE NOTE PROVIDER - HOSPITAL COURSE
62 year old male w/ PMHx ESRD on dialysis (TThS), HFPEF (On home O2 3L), DM2, CAD s/p MI, CHF, glaucoma, legally blind, HTN, orthostatic hypotension, hyperparathyroid, hyperkalemia, LS spinal stenosis, osteoarthritis, osteoporosis, PAD, hx of osteomyelitis of thoracic vertebrae, remote hx of crack-cocaine use disorder, PSH of BKA of L was sent from facility for anemia. Patient also complained of cough and CXR showed multifocal opacities. Patient was admitted for requiring transfusion for anemia, and pneumonia.        Problem/Plan - 1:  ·  Problem: Symptomatic anemia.   ·  Plan: P/w Hb of 6.2..Denies any active signs of active bleed.   Likely in setting of CKD  Known history of esophagitis but refuses further work up.   Transfused 2U PRBC intradialysis.   IV PPI BID.   Transfuse if HB <7.  Monitor CBC.   Avoid NSAIDs.     Problem/Plan - 2:  ·  Problem: CAP (community acquired pneumonia).   ·  Plan: P/w mild cough.  CXR: Bilateral RIGHT greater than LEFT multifocal and diffuse airspace opacities and/or RIGHT effusion layering along posterior thoracic wall.  on Ceftriaxone and azithromycin.   ID consulted - Dr Newby.     Problem/Plan - 3:  ·  Problem: Volume overload.   ·  Plan: Likely 2/2 to being underdialyzed.   C/w HD as per schedule.     Problem/Plan - 4:  ·  Problem: ESRD on hemodialysis.   ·  Plan: HD T, TH, S.  Nephro consulted - Dr Mosher.     Problem/Plan - 5:  ·  Problem: Chronic diastolic congestive heart failure.   ·  Plan: Echo Feb 23: EF 45%, D1DD.  On home O2 3L.   C/w lasix and metolazone.     Problem/Plan - 6:  ·  Problem: Hypertension.   ·  Plan: C/w home dose of metoprolol amlodipine, and hydralazine.     Problem/Plan - 7:  ·  Problem: Hyperlipidemia.   ·  Plan: C/w statin.     Problem/Plan - 8:  ·  Problem: Hypothyroidism.   ·  Plan: C/w home dose of levothyroxine.    Please note that this a brief summary of hospital course please refer to daily progress notes and consult notes for full course and events 62 year old male w/ PMHx ESRD on dialysis (TThS), HFPEF (On home O2 3L), DM2, CAD s/p MI, CHF, glaucoma, legally blind, HTN, orthostatic hypotension, hyperparathyroid, hyperkalemia, LS spinal stenosis, osteoarthritis, osteoporosis, PAD, hx of osteomyelitis of thoracic vertebrae, remote hx of crack-cocaine use disorder, PSH of BKA of L was sent from facility for anemia. Patient also complained of cough and CXR showed multifocal opacities. Patient was admitted for acute anemia, and pneumonia.       Please note that this a brief summary of hospital course please refer to daily progress notes and consult notes for full course and events 62 year old male, [facility] w/ PMHx ESRD on dialysis (TThS), HFPEF (On home O2 3L), DM2, CAD s/p MI, CHF, glaucoma, legally blind, HTN, orthostatic hypotension, hyperparathyroid, hyperkalemia, LS spinal stenosis, osteoarthritis, osteoporosis, PAD, hx of osteomyelitis of thoracic vertebrae, remote hx of crack-cocaine use disorder, PSH of BKA of L was sent from facility for anemia. Patient also complained of cough and CXR showed multifocal opacities. Patient was admitted for acute on chronic anemia, and pneumonia. Pt was given 2 units of prbc, and antibiotics.   Pt last had HD on 7/3/23. Pt is medically optimized for discharge.     Please note that this a brief summary of hospital course please refer to daily progress notes and consult notes for full course and events

## 2023-07-02 NOTE — PROGRESS NOTE ADULT - ASSESSMENT
61 yo M pmh of ESRD (on HD T/Th/S, due for HD today), on home oxygen (3 L NC), anemia, blindness, CAD, LLE amputation, sent from NH due to severe anemia.    ESRD on HD  Consent in chart  Access: [facility]  HD center: [facility]  Schedule TTS  Overloaded  cut HD session yesterday, received HD for <2hrs  Plan for UF session tomorrow and Regular HD on Tuesday  renal diet  avoid nephrotoxins  daily BMP    Anemia of CKD  Last H/H 8.7 on 6/23 outpatient  Epo 20k TIW  Transfuse to keep H/H >7  needs further anemia work up, denies melena but blind  CBC QD    HTN  UF as tolerated      For any question, call:  Cell # [phone]  Pager # [phone]  Callback # [phone]

## 2023-07-02 NOTE — CONSULT NOTE ADULT - SUBJECTIVE AND OBJECTIVE BOX
HPI:  Patient is a 62 year old male w/ PMHx ESRD on dialysis (TThS), HFPEF (On home O2 3L), DM2, CAD s/p MI, CHF, glaucoma, legally blind, HTN, orthostatic hypotension, hyperparathyroid, hyperkalemia, LS spinal stenosis, osteoarthritis, osteoporosis, PAD, hx of osteomyelitis of thoracic vertebrae, remote hx of crack-cocaine use disorder, PSH of BKA of L was sent from facility for anemia. Patient is agitated that he is in the hospital, and refuses to offer more history. Patient reports that he does not have any complains and that all he wants is his blood and leave. Patient denies any weakness, fatigue, lightheadedness, dizziness, bright red blood per rectum, melena, hematochezia, hematemesis. Patient denies any other complains at this time but refuses to participate in a thorough ROS.     Of  note: Patient was admitted to hospital in May 2023 for similar complains.    (01 Jul 2023 14:06)      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      Osteoarthritis      Vision loss of left eye  blind      ESRD on hemodialysis  T/Th/S      Cataract  both eyes - hx of sx done      BPH (benign prostatic hyperplasia)      UTI (urinary tract infection)  hx of      Bladder mass  hx of      H/O hematuria      Osteoporosis      Vision loss of right eye  decreased      Depression      Chronic GERD      Osteomyelitis of vertebra      CHF (congestive heart failure)      Below knee amputation status, left  2012- pt is wearing prostesis      History of right cataract extraction      History of left cataract extraction      S/P arteriovenous (AV) fistula creation  right arm brachiocephalic arteriovenous fistula on 11/08/2018      H/O hematuria  s/p bladder bx and fulguration 2/25/2020      H/O transurethral destruction of bladder lesion  2020      History of excision of mass  back mass on 03/31/2021          fish (Rash)  liver (Anaphylaxis)  No Known Drug Allergies      Meds:  acetaminophen     Tablet .. 650 milliGRAM(s) Oral every 6 hours PRN  albuterol/ipratropium for Nebulization 3 milliLiter(s) Nebulizer every 6 hours PRN  ALPRAZolam 0.25 milliGRAM(s) Oral at bedtime PRN  amLODIPine   Tablet 10 milliGRAM(s) Oral daily  aspirin  chewable 81 milliGRAM(s) Oral daily  atorvastatin 40 milliGRAM(s) Oral at bedtime  azithromycin  IVPB      azithromycin  IVPB 500 milliGRAM(s) IV Intermittent every 24 hours  budesonide 160 MICROgram(s)/formoterol 4.5 MICROgram(s) Inhaler 2 Puff(s) Inhalation two times a day  calcium carbonate    500 mG (Tums) Chewable 1 Tablet(s) Chew daily  cefTRIAXone   IVPB 1000 milliGRAM(s) IV Intermittent every 24 hours  cyanocobalamin 1000 MICROGram(s) Oral daily  epoetin beverley (PROCRIT) Injectable 12659 Unit(s) IV Push <User Schedule>  epoetin beverley-epbx (RETACRIT) Injectable 14582 Unit(s) IV Push <User Schedule>  ferrous    sulfate 325 milliGRAM(s) Oral daily  folic acid 1 milliGRAM(s) Oral daily  furosemide    Tablet 80 milliGRAM(s) Oral daily  hydrALAZINE 25 milliGRAM(s) Oral three times a day  insulin glargine Injectable (LANTUS) 4 Unit(s) SubCutaneous at bedtime  insulin lispro (ADMELOG) corrective regimen sliding scale   SubCutaneous Before meals and at bedtime  latanoprost 0.005% Ophthalmic Solution 1 Drop(s) Both EYES at bedtime  levothyroxine 25 MICROGram(s) Oral daily  melatonin 5 milliGRAM(s) Oral at bedtime  metolazone 10 milliGRAM(s) Oral daily  metoprolol tartrate 25 milliGRAM(s) Oral two times a day  pantoprazole    Tablet 40 milliGRAM(s) Oral before breakfast  sodium zirconium cyclosilicate 10 Gram(s) Oral <User Schedule>  sucralfate 1 Gram(s) Oral four times a day      SOCIAL HISTORY:  Smoker:  YES / NO        PACK YEARS:                         WHEN QUIT?  ETOH use:  YES / NO               FREQUENCY / QUANTITY:  Ilicit Drug use:  YES / NO  Occupation:  Assisted device use (Cane / Walker):  Live with:    FAMILY HISTORY:  Family history of cirrhosis of liver (Mother)    Family history of renal failure (Sibling)    Family history of hypertension (Sibling)    Family history of diabetes mellitus (Sibling)    History of substance abuse in sibling (Sibling)        VITALS:  Vital Signs Last 24 Hrs  T(C): 37.4 (02 Jul 2023 14:56), Max: 37.4 (02 Jul 2023 14:30)  T(F): 99.3 (02 Jul 2023 14:56), Max: 99.3 (02 Jul 2023 14:30)  HR: 92 (02 Jul 2023 14:56) (90 - 96)  BP: 158/87 (02 Jul 2023 14:56) (144/71 - 158/87)  BP(mean): --  RR: 18 (02 Jul 2023 14:56) (16 - 20)  SpO2: 92% (02 Jul 2023 14:56) (92% - 98%)    Parameters below as of 02 Jul 2023 14:56  Patient On (Oxygen Delivery Method): nasal cannula  O2 Flow (L/min): 3      LABS/DIAGNOSTIC TESTS:                          7.1    3.22  )-----------( 113      ( 01 Jul 2023 20:33 )             23.0     WBC Count: 3.22 K/uL (07-01 @ 20:33)  WBC Count: 3.47 K/uL (07-01 @ 10:50)      07-01    138  |  102  |  52<H>  ----------------------------<  131<H>  5.0   |  26  |  10.80<H>    Ca    9.0      01 Jul 2023 10:50    TPro  6.1  /  Alb  2.6<L>  /  TBili  0.4  /  DBili  x   /  AST  40  /  ALT  30  /  AlkPhos  83  07-01            LIVER FUNCTIONS - ( 01 Jul 2023 10:50 )  Alb: 2.6 g/dL / Pro: 6.1 g/dL / ALK PHOS: 83 U/L / ALT: 30 U/L DA / AST: 40 U/L / GGT: x                 LACTATE:    ABG -     CULTURES:   .Blood Blood-Peripheral  05-28 @ 14:07   No Growth Final  --  --          RADIOLOGY:< from: Xray Chest 1 View AP/PA (07.01.23 @ 11:16) >  ACC: [id number] EXAM:  XR CHEST AP OR PA 1V   ORDERED BY: MERY LUNA     PROCEDURE DATE:  07/01/2023          INTERPRETATION:  Portable chest radiograph    CLINICAL INFORMATION: Hypoxia    TECHNIQUE:  Portable  AP chest radiograph.    COMPARISON: 5/28/2023 chest x-ray .    FINDINGS:  CATHETERS AND TUBES: None    PULMONARY: Bilateral RIGHT greater than LEFT multifocal and diffuse   airspace opacities and/or RIGHT effusion layering along posterior   thoracic wall..  No pneumothorax.    HEART/VASCULAR: The  heart is enlarged in transverse diameter. .    BONES: Visualized osseous thorax intact.    IMPRESSION:  Bilateral RIGHT greater than LEFT multifocal and diffuse airspace   opacities and/or RIGHT effusion layering along posterior thoracicwall...    --- End of Report ---            ARASH MONTGOMERY MD; Attending Radiologist  This document has been electronically signed. Jul 1 2023 11:53AM    < end of copied text >        ROS  [  ] UNABLE TO ELICIT               HPI:  Patient is a 62 year old male w/ PMHx ESRD on dialysis (TThS), HFPEF (On home O2 3L), DM2, CAD s/p MI, CHF, glaucoma, legally blind, HTN, orthostatic hypotension, hyperparathyroid, hyperkalemia, LS spinal stenosis, osteoarthritis, osteoporosis, PAD, hx of osteomyelitis of thoracic vertebrae, remote hx of crack-cocaine use disorder, PSH of BKA of L was sent from facility for anemia. Patient is agitated that he is in the hospital, and refuses to offer more history. Patient reports that he does not have any complains and that all he wants is his blood and leave. Patient denies any weakness, fatigue, lightheadedness, dizziness, bright red blood per rectum, melena, hematochezia, hematemesis. Patient denies any other complains at this time but refuses to participate in a thorough ROS.     Of  note: Patient was admitted to hospital in May 2023 for similar complains.    (01 Jul 2023 14:06)        History as above, he is a patient who is difficult and does not like answering questions.          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      Osteoarthritis      Vision loss of left eye  blind      ESRD on hemodialysis  T/Th/S      Cataract  both eyes - hx of sx done      BPH (benign prostatic hyperplasia)      UTI (urinary tract infection)  hx of      Bladder mass  hx of      H/O hematuria      Osteoporosis      Vision loss of right eye  decreased      Depression      Chronic GERD      Osteomyelitis of vertebra      CHF (congestive heart failure)      Below knee amputation status, left  2012- pt is wearing prostesis      History of right cataract extraction      History of left cataract extraction      S/P arteriovenous (AV) fistula creation  right arm brachiocephalic arteriovenous fistula on 11/08/2018      H/O hematuria  s/p bladder bx and fulguration 2/25/2020      H/O transurethral destruction of bladder lesion  2020      History of excision of mass  back mass on 03/31/2021          fish (Rash)  liver (Anaphylaxis)  No Known Drug Allergies      Meds:  acetaminophen     Tablet .. 650 milliGRAM(s) Oral every 6 hours PRN  albuterol/ipratropium for Nebulization 3 milliLiter(s) Nebulizer every 6 hours PRN  ALPRAZolam 0.25 milliGRAM(s) Oral at bedtime PRN  amLODIPine   Tablet 10 milliGRAM(s) Oral daily  aspirin  chewable 81 milliGRAM(s) Oral daily  atorvastatin 40 milliGRAM(s) Oral at bedtime  azithromycin  IVPB      azithromycin  IVPB 500 milliGRAM(s) IV Intermittent every 24 hours  budesonide 160 MICROgram(s)/formoterol 4.5 MICROgram(s) Inhaler 2 Puff(s) Inhalation two times a day  calcium carbonate    500 mG (Tums) Chewable 1 Tablet(s) Chew daily  cefTRIAXone   IVPB 1000 milliGRAM(s) IV Intermittent every 24 hours  cyanocobalamin 1000 MICROGram(s) Oral daily  epoetin beverley (PROCRIT) Injectable 79299 Unit(s) IV Push <User Schedule>  epoetin beverley-epbx (RETACRIT) Injectable 89521 Unit(s) IV Push <User Schedule>  ferrous    sulfate 325 milliGRAM(s) Oral daily  folic acid 1 milliGRAM(s) Oral daily  furosemide    Tablet 80 milliGRAM(s) Oral daily  hydrALAZINE 25 milliGRAM(s) Oral three times a day  insulin glargine Injectable (LANTUS) 4 Unit(s) SubCutaneous at bedtime  insulin lispro (ADMELOG) corrective regimen sliding scale   SubCutaneous Before meals and at bedtime  latanoprost 0.005% Ophthalmic Solution 1 Drop(s) Both EYES at bedtime  levothyroxine 25 MICROGram(s) Oral daily  melatonin 5 milliGRAM(s) Oral at bedtime  metolazone 10 milliGRAM(s) Oral daily  metoprolol tartrate 25 milliGRAM(s) Oral two times a day  pantoprazole    Tablet 40 milliGRAM(s) Oral before breakfast  sodium zirconium cyclosilicate 10 Gram(s) Oral <User Schedule>  sucralfate 1 Gram(s) Oral four times a day      SOCIAL HISTORY:  Smoker:  YES / NO        PACK YEARS:                         WHEN QUIT?  ETOH use:  YES / NO               FREQUENCY / QUANTITY:  Ilicit Drug use:  YES / NO  Occupation:  Assisted device use (Cane / Walker):  Live with:    FAMILY HISTORY:  Family history of cirrhosis of liver (Mother)    Family history of renal failure (Sibling)    Family history of hypertension (Sibling)    Family history of diabetes mellitus (Sibling)    History of substance abuse in sibling (Sibling)        VITALS:  Vital Signs Last 24 Hrs  T(C): 37.4 (02 Jul 2023 14:56), Max: 37.4 (02 Jul 2023 14:30)  T(F): 99.3 (02 Jul 2023 14:56), Max: 99.3 (02 Jul 2023 14:30)  HR: 92 (02 Jul 2023 14:56) (90 - 96)  BP: 158/87 (02 Jul 2023 14:56) (144/71 - 158/87)  BP(mean): --  RR: 18 (02 Jul 2023 14:56) (16 - 20)  SpO2: 92% (02 Jul 2023 14:56) (92% - 98%)    Parameters below as of 02 Jul 2023 14:56  Patient On (Oxygen Delivery Method): nasal cannula  O2 Flow (L/min): 3      LABS/DIAGNOSTIC TESTS:                          7.1    3.22  )-----------( 113      ( 01 Jul 2023 20:33 )             23.0     WBC Count: 3.22 K/uL (07-01 @ 20:33)  WBC Count: 3.47 K/uL (07-01 @ 10:50)      07-01    138  |  102  |  52<H>  ----------------------------<  131<H>  5.0   |  26  |  10.80<H>    Ca    9.0      01 Jul 2023 10:50    TPro  6.1  /  Alb  2.6<L>  /  TBili  0.4  /  DBili  x   /  AST  40  /  ALT  30  /  AlkPhos  83  07-01            LIVER FUNCTIONS - ( 01 Jul 2023 10:50 )  Alb: 2.6 g/dL / Pro: 6.1 g/dL / ALK PHOS: 83 U/L / ALT: 30 U/L DA / AST: 40 U/L / GGT: x                 LACTATE:    ABG -     CULTURES:   .Blood Blood-Peripheral  05-28 @ 14:07   No Growth Final  --  --          RADIOLOGY:< from: Xray Chest 1 View AP/PA (07.01.23 @ 11:16) >  ACC: [id number] EXAM:  XR CHEST AP OR PA 1V   ORDERED BY: MERY LUNA     PROCEDURE DATE:  07/01/2023          INTERPRETATION:  Portable chest radiograph    CLINICAL INFORMATION: Hypoxia    TECHNIQUE:  Portable  AP chest radiograph.    COMPARISON: 5/28/2023 chest x-ray .    FINDINGS:  CATHETERS AND TUBES: None    PULMONARY: Bilateral RIGHT greater than LEFT multifocal and diffuse   airspace opacities and/or RIGHT effusion layering along posterior   thoracic wall..  No pneumothorax.    HEART/VASCULAR: The  heart is enlarged in transverse diameter. .    BONES: Visualized osseous thorax intact.    IMPRESSION:  Bilateral RIGHT greater than LEFT multifocal and diffuse airspace   opacities and/or RIGHT effusion layering along posterior thoracicwall...    --- End of Report ---            ARASH MONTGOMERY MD; Attending Radiologist  This document has been electronically signed. Jul 1 2023 11:53AM    < end of copied text >        ROS  [  ] UNABLE TO ELICIT               HPI:  Patient is a 62 year old male w/ PMHx ESRD on dialysis (TThS), HFPEF (On home O2 3L), DM2, CAD s/p MI, CHF, glaucoma, legally blind, HTN, orthostatic hypotension, hyperparathyroid, hyperkalemia, LS spinal stenosis, osteoarthritis, osteoporosis, PAD, hx of osteomyelitis of thoracic vertebrae, remote hx of crack-cocaine use disorder, PSH of BKA of L was sent from facility for anemia. Patient is agitated that he is in the hospital, and refuses to offer more history. Patient reports that he does not have any complains and that all he wants is his blood and leave. Patient denies any weakness, fatigue, lightheadedness, dizziness, bright red blood per rectum, melena, hematochezia, hematemesis. Patient denies any other complains at this time but refuses to participate in a thorough ROS.     Of  note: Patient was admitted to hospital in May 2023 for similar complains.    (01 Jul 2023 14:06)        History as above, he is a patient who is difficult and does not like answering questions. He was admitted for anemia and was found to have bilat infiltrates on CXR with the appearance of CHF, he is coughing a little but not bringing up any sputum, he is not SOB currently even though he is not on any oxygen at this time, he is not coughing in front of me either. He has no fevers or chills, he has no Leukocytosis either. He is on Rocephin and Azithro to cover for pneumonia here. He is being difficult with the staff and his providers.          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      Osteoarthritis      Vision loss of left eye  blind      ESRD on hemodialysis  T/Th/S      Cataract  both eyes - hx of sx done      BPH (benign prostatic hyperplasia)      UTI (urinary tract infection)  hx of      Bladder mass  hx of      H/O hematuria      Osteoporosis      Vision loss of right eye  decreased      Depression      Chronic GERD      Osteomyelitis of vertebra      CHF (congestive heart failure)      Below knee amputation status, left  2012- pt is wearing prostesis      History of right cataract extraction      History of left cataract extraction      S/P arteriovenous (AV) fistula creation  right arm brachiocephalic arteriovenous fistula on 11/08/2018      H/O hematuria  s/p bladder bx and fulguration 2/25/2020      H/O transurethral destruction of bladder lesion  2020      History of excision of mass  back mass on 03/31/2021          fish (Rash)  liver (Anaphylaxis)  No Known Drug Allergies      Meds:  acetaminophen     Tablet .. 650 milliGRAM(s) Oral every 6 hours PRN  albuterol/ipratropium for Nebulization 3 milliLiter(s) Nebulizer every 6 hours PRN  ALPRAZolam 0.25 milliGRAM(s) Oral at bedtime PRN  amLODIPine   Tablet 10 milliGRAM(s) Oral daily  aspirin  chewable 81 milliGRAM(s) Oral daily  atorvastatin 40 milliGRAM(s) Oral at bedtime  azithromycin  IVPB      azithromycin  IVPB 500 milliGRAM(s) IV Intermittent every 24 hours  budesonide 160 MICROgram(s)/formoterol 4.5 MICROgram(s) Inhaler 2 Puff(s) Inhalation two times a day  calcium carbonate    500 mG (Tums) Chewable 1 Tablet(s) Chew daily  cefTRIAXone   IVPB 1000 milliGRAM(s) IV Intermittent every 24 hours  cyanocobalamin 1000 MICROGram(s) Oral daily  epoetin beverley (PROCRIT) Injectable 57738 Unit(s) IV Push <User Schedule>  epoetin beverley-epbx (RETACRIT) Injectable 05459 Unit(s) IV Push <User Schedule>  ferrous    sulfate 325 milliGRAM(s) Oral daily  folic acid 1 milliGRAM(s) Oral daily  furosemide    Tablet 80 milliGRAM(s) Oral daily  hydrALAZINE 25 milliGRAM(s) Oral three times a day  insulin glargine Injectable (LANTUS) 4 Unit(s) SubCutaneous at bedtime  insulin lispro (ADMELOG) corrective regimen sliding scale   SubCutaneous Before meals and at bedtime  latanoprost 0.005% Ophthalmic Solution 1 Drop(s) Both EYES at bedtime  levothyroxine 25 MICROGram(s) Oral daily  melatonin 5 milliGRAM(s) Oral at bedtime  metolazone 10 milliGRAM(s) Oral daily  metoprolol tartrate 25 milliGRAM(s) Oral two times a day  pantoprazole    Tablet 40 milliGRAM(s) Oral before breakfast  sodium zirconium cyclosilicate 10 Gram(s) Oral <User Schedule>  sucralfate 1 Gram(s) Oral four times a day      SOCIAL HISTORY:  Smoker:  denies  ETOH use:  denies      FAMILY HISTORY:  Family history of cirrhosis of liver (Mother)    Family history of renal failure (Sibling)    Family history of hypertension (Sibling)    Family history of diabetes mellitus (Sibling)    History of substance abuse in sibling (Sibling)        VITALS:  Vital Signs Last 24 Hrs  T(C): 37.4 (02 Jul 2023 14:56), Max: 37.4 (02 Jul 2023 14:30)  T(F): 99.3 (02 Jul 2023 14:56), Max: 99.3 (02 Jul 2023 14:30)  HR: 92 (02 Jul 2023 14:56) (90 - 96)  BP: 158/87 (02 Jul 2023 14:56) (144/71 - 158/87)  BP(mean): --  RR: 18 (02 Jul 2023 14:56) (16 - 20)  SpO2: 92% (02 Jul 2023 14:56) (92% - 98%)    Parameters below as of 02 Jul 2023 14:56  Patient On (Oxygen Delivery Method): nasal cannula  O2 Flow (L/min): 3      LABS/DIAGNOSTIC TESTS:                          7.1    3.22  )-----------( 113      ( 01 Jul 2023 20:33 )             23.0     WBC Count: 3.22 K/uL (07-01 @ 20:33)  WBC Count: 3.47 K/uL (07-01 @ 10:50)      07-01    138  |  102  |  52<H>  ----------------------------<  131<H>  5.0   |  26  |  10.80<H>    Ca    9.0      01 Jul 2023 10:50    TPro  6.1  /  Alb  2.6<L>  /  TBili  0.4  /  DBili  x   /  AST  40  /  ALT  30  /  AlkPhos  83  07-01            LIVER FUNCTIONS - ( 01 Jul 2023 10:50 )  Alb: 2.6 g/dL / Pro: 6.1 g/dL / ALK PHOS: 83 U/L / ALT: 30 U/L DA / AST: 40 U/L / GGT: x                 LACTATE:    ABG -     CULTURES:   .Blood Blood-Peripheral  05-28 @ 14:07   No Growth Final  --  --          RADIOLOGY:< from: Xray Chest 1 View AP/PA (07.01.23 @ 11:16) >  ACC: [id number] EXAM:  XR CHEST AP OR PA 1V   ORDERED BY: MERY LUNA     PROCEDURE DATE:  07/01/2023          INTERPRETATION:  Portable chest radiograph    CLINICAL INFORMATION: Hypoxia    TECHNIQUE:  Portable  AP chest radiograph.    COMPARISON: 5/28/2023 chest x-ray .    FINDINGS:  CATHETERS AND TUBES: None    PULMONARY: Bilateral RIGHT greater than LEFT multifocal and diffuse   airspace opacities and/or RIGHT effusion layering along posterior   thoracic wall..  No pneumothorax.    HEART/VASCULAR: The  heart is enlarged in transverse diameter. .    BONES: Visualized osseous thorax intact.    IMPRESSION:  Bilateral RIGHT greater than LEFT multifocal and diffuse airspace   opacities and/or RIGHT effusion layering along posterior thoracicwall...    --- End of Report ---            ARASH MONTGOMERY MD; Attending Radiologist  This document has been electronically signed. Jul 1 2023 11:53AM    < end of copied text >        ROS  [  ] UNABLE TO ELICIT, limited as he is uncooperative

## 2023-07-02 NOTE — DISCHARGE NOTE PROVIDER - NSDCMRMEDTOKEN_GEN_ALL_CORE_FT
ALPRAZolam 0.25 mg oral tablet: 1 tab(s) orally once a day (at bedtime)  aspirin 81 mg oral tablet: 1 tab(s) orally once a day  Ativan 0.5 mg oral tablet: 1 tab(s) orally Tuesday, Thursday, Saturday  calcium (as carbonate) 500 mg oral tablet: 1 tab(s) orally once a day  Crestor 40 mg oral tablet: 1 tab(s) orally once a day (at bedtime)  cyanocobalamin 100 mcg oral tablet: 1 tab(s) orally once a day  ferrous sulfate 325 mg (65 mg elemental iron) oral delayed release tablet: 1 tab(s) orally every other day  folic acid 1 mg oral tablet: 1 tab(s) orally once a day  hydrALAZINE 25 mg oral tablet: 1 tab(s) orally 3 times a day  Lantus 100 units/mL subcutaneous solution: 4 unit(s) subcutaneous once a day (at bedtime)  Lasix 80 mg oral tablet: 1 tab(s) orally once a day  latanoprost 0.005% ophthalmic solution: in each eye once a day (at bedtime)  Lokelma 10 g oral powder for reconstitution: 10 gram(s) orally Monday, Wednesday, and Friday  melatonin 6 mg oral tablet, disintegratin tab(s) orally once a day  metOLazone 10 mg oral tablet: 1 tab(s) orally once a day  metoprolol succinate 50 mg oral tablet, extended release: 1 tab(s) orally once a day  Norvasc 10 mg oral tablet: 1 tab(s) orally once a day  Percocet 5/325 oral tablet: 1 tab(s) orally every 6 hours  Protonix 40 mg oral delayed release tablet: 1 tab(s) orally once a day  Reglan 10 mg oral tablet: 1 tab(s) orally 3 times a day  sucralfate 1 g oral tablet: 1 tab(s) orally 4 times a day  Symbicort 160 mcg-4.5 mcg/inh inhalation aerosol: 2 puff(s) inhaled 2 times a day  Synthroid 25 mcg (0.025 mg) oral tablet: 1 tab(s) orally once a day   Albuterol (Eqv-ProAir HFA) 90 mcg/inh inhalation aerosol: 2 puff(s) inhaled every 6 hours as needed for  shortness of breath and/or wheezing  ALPRAZolam 0.25 mg oral tablet: 1 tab(s) orally once a day (at bedtime)  aspirin 81 mg oral tablet: 1 tab(s) orally once a day  Ativan 0.5 mg oral tablet: 1 tab(s) orally Tuesday, Thursday, Saturday  calcium (as carbonate) 500 mg oral tablet: 1 tab(s) orally once a day  Crestor 40 mg oral tablet: 1 tab(s) orally once a day (at bedtime)  cyanocobalamin 100 mcg oral tablet: 1 tab(s) orally once a day  epoetin beverley 20,000 units/mL injectable solution: 20,000 unit(s) intravenously Tuesday, Thursday, Saturday during dialysis  ferrous sulfate 325 mg (65 mg elemental iron) oral delayed release tablet: 1 tab(s) orally every other day  folic acid 1 mg oral tablet: 1 tab(s) orally once a day  hydrALAZINE 25 mg oral tablet: 1 tab(s) orally 3 times a day  Lantus 100 units/mL subcutaneous solution: 4 unit(s) subcutaneous once a day (at bedtime)  Lasix 80 mg oral tablet: 1 tab(s) orally once a day  latanoprost 0.005% ophthalmic solution: in each eye once a day (at bedtime)  Lokelma 10 g oral powder for reconstitution: 10 gram(s) orally Monday, Wednesday, and Friday  melatonin 6 mg oral tablet, disintegratin tab(s) orally once a day  metOLazone 10 mg oral tablet: 1 tab(s) orally once a day  metoprolol succinate 50 mg oral tablet, extended release: 1 tab(s) orally once a day  Norvasc 10 mg oral tablet: 1 tab(s) orally once a day  Percocet 5/325 oral tablet: 1 tab(s) orally every 6 hours  Protonix 40 mg oral delayed release tablet: 1 tab(s) orally once a day  Reglan 10 mg oral tablet: 1 tab(s) orally 3 times a day  sucralfate 1 g oral tablet: 1 tab(s) orally 4 times a day  Symbicort 160 mcg-4.5 mcg/inh inhalation aerosol: 2 puff(s) inhaled 2 times a day  Synthroid 25 mcg (0.025 mg) oral tablet: 1 tab(s) orally once a day

## 2023-07-02 NOTE — PROGRESS NOTE ADULT - SUBJECTIVE AND OBJECTIVE BOX
[facility] - POLA Garcia / POLA Doll / AKSHAT Tello/ POLA Maddox/ POLA Young/ ELISA [place] / JARAD Mosher / FLORA Laboy  ---------------------------------------------------------------------------------------------------------------  seen and examined today for ESRD  Interval : S/P transfusion  VITALS:  T(F): 98.4 (07-02-23 @ 08:00), Max: 98.6 (07-01-23 @ 14:21)  HR: 90 (07-02-23 @ 08:00)  BP: 157/76 (07-02-23 @ 08:00)  RR: 16 (07-02-23 @ 08:00)  SpO2: 98% (07-02-23 @ 08:00)  Wt(kg): --    07-01 @ 07:01  -  07-02 @ 07:00  --------------------------------------------------------  IN: 850 mL / OUT: 1764 mL / NET: -914 mL      Physical Exam :-  Constitutional: NAD  Neck: Supple.  Respiratory: crackles B/L  Cardiovascular: S1, S2 normal,  Gastrointestinal: Bowel Sounds present, soft, non tender.  Extremities: LLE BKA  Neurological: Alert and Oriented  Psychiatric: Normal mood, normal affect  Data:-  Allergies :   fish (Rash)  liver (Anaphylaxis)  No Known Drug Allergies    Hospital Medications:   MEDICATIONS  (STANDING):  amLODIPine   Tablet 10 milliGRAM(s) Oral daily  aspirin  chewable 81 milliGRAM(s) Oral daily  atorvastatin 40 milliGRAM(s) Oral at bedtime  azithromycin  IVPB      azithromycin  IVPB 500 milliGRAM(s) IV Intermittent every 24 hours  budesonide 160 MICROgram(s)/formoterol 4.5 MICROgram(s) Inhaler 2 Puff(s) Inhalation two times a day  calcium carbonate    500 mG (Tums) Chewable 1 Tablet(s) Chew daily  cefTRIAXone   IVPB 1000 milliGRAM(s) IV Intermittent every 24 hours  cyanocobalamin 1000 MICROGram(s) Oral daily  epoetin beverley (PROCRIT) Injectable 94405 Unit(s) IV Push <User Schedule>  epoetin beverley-epbx (RETACRIT) Injectable 06892 Unit(s) IV Push <User Schedule>  ferrous    sulfate 325 milliGRAM(s) Oral daily  folic acid 1 milliGRAM(s) Oral daily  furosemide    Tablet 80 milliGRAM(s) Oral daily  hydrALAZINE 25 milliGRAM(s) Oral three times a day  insulin glargine Injectable (LANTUS) 4 Unit(s) SubCutaneous at bedtime  insulin lispro (ADMELOG) corrective regimen sliding scale   SubCutaneous Before meals and at bedtime  latanoprost 0.005% Ophthalmic Solution 1 Drop(s) Both EYES at bedtime  levothyroxine 25 MICROGram(s) Oral daily  melatonin 5 milliGRAM(s) Oral at bedtime  metolazone 10 milliGRAM(s) Oral daily  metoprolol tartrate 25 milliGRAM(s) Oral two times a day  pantoprazole    Tablet 40 milliGRAM(s) Oral before breakfast  sodium zirconium cyclosilicate 10 Gram(s) Oral <User Schedule>  sucralfate 1 Gram(s) Oral four times a day    07-01    138  |  102  |  52<H>  ----------------------------<  131<H>  5.0   |  26  |  10.80<H>    Ca    9.0      01 Jul 2023 10:50    TPro  6.1  /  Alb  2.6<L>  /  TBili  0.4  /  DBili      /  AST  40  /  ALT  30  /  AlkPhos  83  07-01    Creatinine Trend: 10.80 <--                        7.1    3.22  )-----------( 113      ( 01 Jul 2023 20:33 )             23.0

## 2023-07-02 NOTE — DISCHARGE NOTE PROVIDER - CARE PROVIDER_API CALL
Dario De La Torre  Internal Medicine  [address], [address] [place] [address]  Phone: [phone]  Fax: [phone]  Established Patient  Follow Up Time: 1 week

## 2023-07-02 NOTE — PROGRESS NOTE ADULT - SUBJECTIVE AND OBJECTIVE BOX
Patient is a 62y old  Male who presents with a chief complaint of Symptomatic anemia (02 Jul 2023 18:32)    PATIENT IS SEEN AND EXAMINED IN MEDICAL FLOOR.  NGT [    ]    JEREMY [   ]      GT [   ]    ALLERGIES:  fish (Rash)  liver (Anaphylaxis)  No Known Drug Allergies      Daily     Daily     VITALS:    Vital Signs Last 24 Hrs  T(C): 37.2 (02 Jul 2023 20:13), Max: 37.4 (02 Jul 2023 14:30)  T(F): 99 (02 Jul 2023 20:13), Max: 99.3 (02 Jul 2023 14:30)  HR: 90 (02 Jul 2023 20:13) (90 - 92)  BP: 152/84 (02 Jul 2023 20:13) (152/84 - 158/87)  BP(mean): --  RR: 18 (02 Jul 2023 20:13) (16 - 18)  SpO2: 95% (02 Jul 2023 20:13) (92% - 98%)    Parameters below as of 02 Jul 2023 20:13  Patient On (Oxygen Delivery Method): nasal cannula  O2 Flow (L/min): 3      LABS:    CBC Full  -  ( 01 Jul 2023 20:33 )  WBC Count : 3.22 K/uL  RBC Count : 2.59 M/uL  Hemoglobin : 7.1 g/dL  Hematocrit : 23.0 %  Platelet Count - Automated : 113 K/uL  Mean Cell Volume : 88.8 fl  Mean Cell Hemoglobin : 27.4 pg  Mean Cell Hemoglobin Concentration : 30.9 gm/dL  Auto Neutrophil # : x  Auto Lymphocyte # : x  Auto Monocyte # : x  Auto Eosinophil # : x  Auto Basophil # : x  Auto Neutrophil % : x  Auto Lymphocyte % : x  Auto Monocyte % : x  Auto Eosinophil % : x  Auto Basophil % : x      07-01    138  |  102  |  52<H>  ----------------------------<  131<H>  5.0   |  26  |  10.80<H>    Ca    9.0      01 Jul 2023 10:50    TPro  6.1  /  Alb  2.6<L>  /  TBili  0.4  /  DBili  x   /  AST  40  /  ALT  30  /  AlkPhos  83  07-01    CAPILLARY BLOOD GLUCOSE      POCT Blood Glucose.: 100 mg/dL (02 Jul 2023 20:43)  POCT Blood Glucose.: 111 mg/dL (02 Jul 2023 17:03)  POCT Blood Glucose.: 107 mg/dL (02 Jul 2023 11:50)  POCT Blood Glucose.: 106 mg/dL (02 Jul 2023 08:25)        LIVER FUNCTIONS - ( 01 Jul 2023 10:50 )  Alb: 2.6 g/dL / Pro: 6.1 g/dL / ALK PHOS: 83 U/L / ALT: 30 U/L DA / AST: 40 U/L / GGT: x           Creatinine Trend: 10.80<--  I&O's Summary    01 Jul 2023 07:01  -  02 Jul 2023 07:00  --------------------------------------------------------  IN: 850 mL / OUT: 1764 mL / NET: -914 mL            .Blood Blood-Peripheral  05-28 @ 14:07   No Growth Final  --  --          MEDICATIONS:    MEDICATIONS  (STANDING):  amLODIPine   Tablet 10 milliGRAM(s) Oral daily  aspirin  chewable 81 milliGRAM(s) Oral daily  atorvastatin 40 milliGRAM(s) Oral at bedtime  azithromycin  IVPB 500 milliGRAM(s) IV Intermittent every 24 hours  azithromycin  IVPB      budesonide 160 MICROgram(s)/formoterol 4.5 MICROgram(s) Inhaler 2 Puff(s) Inhalation two times a day  calcium carbonate    500 mG (Tums) Chewable 1 Tablet(s) Chew daily  cefTRIAXone   IVPB 1000 milliGRAM(s) IV Intermittent every 24 hours  cyanocobalamin 1000 MICROGram(s) Oral daily  epoetin beverley (PROCRIT) Injectable 15011 Unit(s) IV Push <User Schedule>  epoetin beverley-epbx (RETACRIT) Injectable 39210 Unit(s) IV Push <User Schedule>  ferrous    sulfate 325 milliGRAM(s) Oral daily  folic acid 1 milliGRAM(s) Oral daily  furosemide    Tablet 80 milliGRAM(s) Oral daily  hydrALAZINE 25 milliGRAM(s) Oral three times a day  insulin glargine Injectable (LANTUS) 4 Unit(s) SubCutaneous at bedtime  insulin lispro (ADMELOG) corrective regimen sliding scale   SubCutaneous Before meals and at bedtime  latanoprost 0.005% Ophthalmic Solution 1 Drop(s) Both EYES at bedtime  levothyroxine 25 MICROGram(s) Oral daily  melatonin 5 milliGRAM(s) Oral at bedtime  metolazone 10 milliGRAM(s) Oral daily  metoprolol tartrate 25 milliGRAM(s) Oral two times a day  pantoprazole    Tablet 40 milliGRAM(s) Oral before breakfast  sodium zirconium cyclosilicate 10 Gram(s) Oral <User Schedule>  sucralfate 1 Gram(s) Oral four times a day      MEDICATIONS  (PRN):  acetaminophen     Tablet .. 650 milliGRAM(s) Oral every 6 hours PRN Temp greater or equal to 38C (100.4F), Mild Pain (1 - 3)  albuterol/ipratropium for Nebulization 3 milliLiter(s) Nebulizer every 6 hours PRN Shortness of Breath and/or Wheezing  ALPRAZolam 0.25 milliGRAM(s) Oral at bedtime PRN anxiety/insomnia      REVIEW OF SYSTEMS:                           ALL ROS DONE [ X   ]    CONSTITUTIONAL:  LETHARGIC [   ], FEVER [   ], UNRESPONSIVE [   ]  CVS:  CP  [   ], SOB, [   ], PALPITATIONS [   ], DIZZYNESS [   ]  RS: COUGH [   ], SPUTUM [   ]  GI: ABDOMINAL PAIN [   ], NAUSEA [   ], VOMITINGS [   ], DIARRHEA [   ], CONSTIPATION [   ]  :  DYSURIA [   ], NOCTURIA [   ], INCREASED FREQUENCY [   ], DRIBLING [   ],  SKELETAL: PAINFUL JOINTS [   ], SWOLLEN JOINTS [   ], NECK ACHE [   ], LOW BACK ACHE [   ],  SKIN : ULCERS [   ], RASH [   ], ITCHING [   ]  CNS: HEAD ACHE [   ], DOUBLE VISION [   ], BLURRED VISION [   ], AMS / CONFUSION [   ], SEIZURES [   ], WEAKNESS [   ],TINGLING / NUMBNESS [   ]    PHYSICAL EXAMINATION:  GENERAL APPEARANCE: NO DISTRESS  HEENT:  NO PALLOR, NO  JVD,  NO   NODES, NECK SUPPLE  CVS: S1 +, S2 +,   RS: AEEB,  OCCASIONAL  RALES +,   NO RONCHI  ABD: SOFT, NT, NO, BS +  EXT: NO PE  SKIN: WARM,   SKELETAL:  ROM ACCEPTABLE  CNS:  AAO X    ,   DEFICITS    RADIOLOGY :      ASSESSMENT :     Anemia    No pertinent past medical history    Hypertension    Adrenal insufficiency    CKD (chronic kidney disease)    Anemia    Glaucoma    Coronary artery disease    HLD (hyperlipidemia)    Peripheral vascular disease    Spinal stenosis of lumbosacral region    Hyperparathyroidism    Diabetes mellitus    Diabetic neuropathy    Contracture of hand    Osteoarthritis    Osteoporosis    Vision loss of left eye    ESRD on hemodialysis    Cataract    BPH (benign prostatic hyperplasia)    UTI (urinary tract infection)    Bladder mass    H/O hematuria    Osteoporosis    Vision loss of right eye    Depression    Chronic GERD    Osteomyelitis of vertebra    CHF (congestive heart failure)    No significant past surgical history    Below knee amputation status, left    History of right cataract extraction    History of left cataract extraction    S/P arteriovenous (AV) fistula creation    H/O hematuria    H/O transurethral destruction of bladder lesion    History of excision of mass        PLAN:  HPI:  Patient is a 62 year old male w/ PMHx ESRD on dialysis (TThS), HFPEF (On home O2 3L), DM2, CAD s/p MI, CHF, glaucoma, legally blind, HTN, orthostatic hypotension, hyperparathyroid, hyperkalemia, LS spinal stenosis, osteoarthritis, osteoporosis, PAD, hx of osteomyelitis of thoracic vertebrae, remote hx of crack-cocaine use disorder, PSH of BKA of L was sent from facility for anemia. Patient is agitated that he is in the hospital, and refuses to offer more history. Patient reports that he does not have any complains and that all he wants is his blood and leave. Patient denies any weakness, fatigue, lightheadedness, dizziness, bright red blood per rectum, melena, hematochezia, hematemesis. Patient denies any other complains at this time but refuses to participate in a thorough ROS.     Of  note: Patient was admitted to hospital in May 2023 for similar complains.    (01 Jul 2023 14:06)    -      Patient is a 62y old  Male who presents with a chief complaint of Symptomatic anemia (02 Jul 2023 18:32)    PATIENT IS SEEN AND EXAMINED IN MEDICAL FLOOR.      ALLERGIES:  fish (Rash)  liver (Anaphylaxis)  No Known Drug Allergies    VITALS:    Vital Signs Last 24 Hrs  T(C): 37.2 (02 Jul 2023 20:13), Max: 37.4 (02 Jul 2023 14:30)  T(F): 99 (02 Jul 2023 20:13), Max: 99.3 (02 Jul 2023 14:30)  HR: 90 (02 Jul 2023 20:13) (90 - 92)  BP: 152/84 (02 Jul 2023 20:13) (152/84 - 158/87)  BP(mean): --  RR: 18 (02 Jul 2023 20:13) (16 - 18)  SpO2: 95% (02 Jul 2023 20:13) (92% - 98%)    Parameters below as of 02 Jul 2023 20:13  Patient On (Oxygen Delivery Method): nasal cannula  O2 Flow (L/min): 3      LABS:    CBC Full  -  ( 01 Jul 2023 20:33 )  WBC Count : 3.22 K/uL  RBC Count : 2.59 M/uL  Hemoglobin : 7.1 g/dL  Hematocrit : 23.0 %  Platelet Count - Automated : 113 K/uL  Mean Cell Volume : 88.8 fl  Mean Cell Hemoglobin : 27.4 pg  Mean Cell Hemoglobin Concentration : 30.9 gm/dL  Auto Neutrophil # : x  Auto Lymphocyte # : x  Auto Monocyte # : x  Auto Eosinophil # : x  Auto Basophil # : x  Auto Neutrophil % : x  Auto Lymphocyte % : x  Auto Monocyte % : x  Auto Eosinophil % : x  Auto Basophil % : x      07-01    138  |  102  |  52<H>  ----------------------------<  131<H>  5.0   |  26  |  10.80<H>    Ca    9.0      01 Jul 2023 10:50    TPro  6.1  /  Alb  2.6<L>  /  TBili  0.4  /  DBili  x   /  AST  40  /  ALT  30  /  AlkPhos  83  07-01    CAPILLARY BLOOD GLUCOSE      POCT Blood Glucose.: 100 mg/dL (02 Jul 2023 20:43)  POCT Blood Glucose.: 111 mg/dL (02 Jul 2023 17:03)  POCT Blood Glucose.: 107 mg/dL (02 Jul 2023 11:50)  POCT Blood Glucose.: 106 mg/dL (02 Jul 2023 08:25)        LIVER FUNCTIONS - ( 01 Jul 2023 10:50 )  Alb: 2.6 g/dL / Pro: 6.1 g/dL / ALK PHOS: 83 U/L / ALT: 30 U/L DA / AST: 40 U/L / GGT: x           Creatinine Trend: 10.80<--  I&O's Summary    01 Jul 2023 07:01  -  02 Jul 2023 07:00  --------------------------------------------------------  IN: 850 mL / OUT: 1764 mL / NET: -914 mL            .Blood Blood-Peripheral  05-28 @ 14:07   No Growth Final  --  --          MEDICATIONS:    MEDICATIONS  (STANDING):  amLODIPine   Tablet 10 milliGRAM(s) Oral daily  aspirin  chewable 81 milliGRAM(s) Oral daily  atorvastatin 40 milliGRAM(s) Oral at bedtime  azithromycin  IVPB 500 milliGRAM(s) IV Intermittent every 24 hours  azithromycin  IVPB      budesonide 160 MICROgram(s)/formoterol 4.5 MICROgram(s) Inhaler 2 Puff(s) Inhalation two times a day  calcium carbonate    500 mG (Tums) Chewable 1 Tablet(s) Chew daily  cefTRIAXone   IVPB 1000 milliGRAM(s) IV Intermittent every 24 hours  cyanocobalamin 1000 MICROGram(s) Oral daily  epoetin beverley (PROCRIT) Injectable 69172 Unit(s) IV Push <User Schedule>  epoetin beverley-epbx (RETACRIT) Injectable 99629 Unit(s) IV Push <User Schedule>  ferrous    sulfate 325 milliGRAM(s) Oral daily  folic acid 1 milliGRAM(s) Oral daily  furosemide    Tablet 80 milliGRAM(s) Oral daily  hydrALAZINE 25 milliGRAM(s) Oral three times a day  insulin glargine Injectable (LANTUS) 4 Unit(s) SubCutaneous at bedtime  insulin lispro (ADMELOG) corrective regimen sliding scale   SubCutaneous Before meals and at bedtime  latanoprost 0.005% Ophthalmic Solution 1 Drop(s) Both EYES at bedtime  levothyroxine 25 MICROGram(s) Oral daily  melatonin 5 milliGRAM(s) Oral at bedtime  metolazone 10 milliGRAM(s) Oral daily  metoprolol tartrate 25 milliGRAM(s) Oral two times a day  pantoprazole    Tablet 40 milliGRAM(s) Oral before breakfast  sodium zirconium cyclosilicate 10 Gram(s) Oral <User Schedule>  sucralfate 1 Gram(s) Oral four times a day      MEDICATIONS  (PRN):  acetaminophen     Tablet .. 650 milliGRAM(s) Oral every 6 hours PRN Temp greater or equal to 38C (100.4F), Mild Pain (1 - 3)  albuterol/ipratropium for Nebulization 3 milliLiter(s) Nebulizer every 6 hours PRN Shortness of Breath and/or Wheezing  ALPRAZolam 0.25 milliGRAM(s) Oral at bedtime PRN anxiety/insomnia      REVIEW OF SYSTEMS:                           ALL ROS DONE [ X   ]    CONSTITUTIONAL:  LETHARGIC [   ], FEVER [   ], UNRESPONSIVE [   ]  CVS:  CP  [   ], SOB, [   ], PALPITATIONS [   ], DIZZYNESS [   ]  RS: COUGH [   ], SPUTUM [   ]  GI: ABDOMINAL PAIN [   ], NAUSEA [   ], VOMITINGS [   ], DIARRHEA [   ], CONSTIPATION [   ]  :  DYSURIA [   ], NOCTURIA [   ], INCREASED FREQUENCY [   ], DRIBLING [   ],  SKELETAL: PAINFUL JOINTS [   ], SWOLLEN JOINTS [   ], NECK ACHE [   ], LOW BACK ACHE [   ],  SKIN : ULCERS [   ], RASH [   ], ITCHING [   ]  CNS: HEAD ACHE [   ], DOUBLE VISION [   ], BLURRED VISION [   ], AMS / CONFUSION [   ], SEIZURES [   ], WEAKNESS [   ],TINGLING / NUMBNESS [   ]    PHYSICAL EXAMINATION:  GENERAL APPEARANCE: NO DISTRESS  HEENT:  NO PALLOR, NO  JVD,  NO   NODES, NECK SUPPLE  CVS: S1 +, S2 +,   RS: AEEB,  OCCASIONAL  RALES +,   CRACKLES+ AT LUNG BASES  ABD: SOFT, NT, NO, BS +  EXT: LEFT BKA  SKIN: WARM,   SKELETAL:  ROM ACCEPTABLE  CNS:  AAO X  3    RADIOLOGY :    RADIOLOGY AND READINGS REVIEWED    ASSESSMENT :     Anemia    No pertinent past medical history    Hypertension    Adrenal insufficiency    CKD (chronic kidney disease)    Anemia    Glaucoma    Coronary artery disease    HLD (hyperlipidemia)    Peripheral vascular disease    Spinal stenosis of lumbosacral region    Hyperparathyroidism    Diabetes mellitus    Diabetic neuropathy    Contracture of hand    Osteoarthritis    Osteoporosis    Vision loss of left eye    ESRD on hemodialysis    Cataract    BPH (benign prostatic hyperplasia)    UTI (urinary tract infection)    Bladder mass    H/O hematuria    Osteoporosis    Vision loss of right eye    Depression    Chronic GERD    Osteomyelitis of vertebra    CHF (congestive heart failure)    No significant past surgical history    Below knee amputation status, left    History of right cataract extraction    History of left cataract extraction    S/P arteriovenous (AV) fistula creation    H/O hematuria    H/O transurethral destruction of bladder lesion    History of excision of mass        PLAN:  HPI:  Patient is a 62 year old male w/ PMHx ESRD on dialysis (TThS), HFPEF (On home O2 3L), DM2, CAD s/p MI, CHF, glaucoma, legally blind, HTN, orthostatic hypotension, hyperparathyroid, hyperkalemia, LS spinal stenosis, osteoarthritis, osteoporosis, PAD, hx of osteomyelitis of thoracic vertebrae, remote hx of crack-cocaine use disorder, PSH of BKA of L was sent from facility for anemia. Patient is agitated that he is in the hospital, and refuses to offer more history. Patient reports that he does not have any complains and that all he wants is his blood and leave. Patient denies any weakness, fatigue, lightheadedness, dizziness, bright red blood per rectum, melena, hematochezia, hematemesis. Patient denies any other complains at this time but refuses to participate in a thorough ROS.     Of  note: Patient was admitted to hospital in May 2023 for similar complains.    (01 Jul 2023 14:06)    # ACUTE ON CHRONIC HYPOXIC RESPIRATORY FAILURE S/T ? PULMONARY EDEMA + HX OF COPD + SUSPECTED MULTIFOCAL PNEUMONIA  # UNCONTROLLED HTN  # ESRD ON HD TTS - W/ HX OF NONCOMPLIANCE    - HYDRALAZINE, METOPROLOL AND NORVASC  - PLACED ROCEPHIN + AZITHROMYCIN, F/U BCX  - SUPPLEMENTAL O2  - NEPHROLOGY CONSULT  - ID CONSULT    # EPISODE OF SMALL VOLUME EMESIS - RESOLVED  # HX OF RECURRENT INTRACTABLE NAUSEA/VOMITING, GASTROPARESIS  # HISTORY OF ESOPHAGITIS AND DUODENITIS [1/2021], HX OF GASTROPARESIS W/ EVIDENCE OF THICKENED ESOPHAGUS ON PREVIOUS CT SCAN     - DENIES RECENT HEMATEMESIS   - MONITORING HGB, PLACED ON PPI BID  - CARAFATE, PRN ANTIEMETICS  - MONITORING FOR SYMPTOMS  - TOLERATING DIET ; PATIENT REPEATEDLY COUNSELLED TO CHEW FOOD CAUTIOUSLY AND CONSUME SMALL BITES W/ REGULAR CLEARING WITH WATER BETWEEN BITES.    - S/P PRBC TRANSFUSION 7/2, PLANNED FOR PRBC TRANSFUSION 7/3    # GASTROPARESIS  # UNDERLYING DM  - SSI + FS    # PATIENT UNDERGOING OUTPATIENT WORKUP FOR CENTRAL VENOUS STENOSIS THROUGH NEPHROLOGY TEAM    # ANEMIA OF CKD  # HX OF PANCYTOPENIA   - TREND HGB, TRANSFUSION THRESHOLD HGB < 7  - TYPE AND SCREEN  - ON EPO  - DENIES RECENT HEMATEMESIS, MELENA, HEMATOCHEZIA    - S/P PRBC TRANSFUSION 7/2, PLANNED FOR PRBC TRANSFUSION 7/3    # SEVERE PROTEIN CALORIE MALNUTRITION, FAILURE TO THRIVE - NUTRITIONAL SUPPLEMENT    # HLD  # PARTIALLY BLIND  # S/P LEFT BKA  # HX OF PVD  # LS SPINAL STENOSIS  # GI AND DVT PPX.

## 2023-07-03 NOTE — DISCHARGE NOTE NURSING/CASE MANAGEMENT/SOCIAL WORK - PATIENT PORTAL LINK FT
You can access the FollowMyHealth Patient Portal offered by [facility] by registering at the following website: [email or web address]. By joining [employer]’s FollowMyHealth portal, you will also be able to view your health information using other applications (apps) compatible with our system.

## 2023-07-03 NOTE — PATIENT PROFILE ADULT - NSPRESCRALCAMT_GEN_A_NUR
[Pathological] : TNM Stage: p [N/A] : Currently not applicable [TTNM] : x [NTNM] : x [MTNM] : x 1 or 2

## 2023-07-09 NOTE — PATIENT PROFILE ADULT - STATED REASON FOR ADMISSION
Missed Hemodialysis for a week. Unhappy with Hemodialysis Center.  Missed Hemodialysis for a week. Unhappy with Hemodialysis Center. "I don't like that palce"

## 2023-07-09 NOTE — ED ADULT NURSE NOTE - OBJECTIVE STATEMENT
Pt was sent from [facility] for missed dialysis x 1 week   Pt states had dialysis last on Tuesday last week at a different Hospital  Refused cxr today , refused to take off his clothes

## 2023-07-09 NOTE — H&P ADULT - NSHPPHYSICALEXAM_GEN_ALL_CORE
GENERAL: NAD, thin elderly male, sitting in bed  HEAD:  Atraumatic, Normocephalic  EYES: EOMI, PERRLA, conjunctiva and sclera clear. left eye blind  NECK: Supple  CHEST/LUNG: Crackles at bases b/l  HEART: Regular rate and rhythm; No murmurs, rubs, or gallops  ABDOMEN: Soft, Nontender, Nondistended; Bowel sounds present  EXTREMITIES:  2+ Peripheral Pulses. 1+ edema up to the thighs on the right leg  PSYCH: AAOx3  NEUROLOGY: non-focal  SKIN: No rashes or lesions

## 2023-07-09 NOTE — ED PROVIDER NOTE - OBJECTIVE STATEMENT
62 year old male PMH ESRD on HD, CHF, DM, CAD, blind, HTN, OA, PAD, L BKA coming in for missed dialysis for a week. pt states he doesn't like the dialysis center because they wont let him use the bathroom while he is getting dialysis.

## 2023-07-09 NOTE — H&P ADULT - PROBLEM SELECTOR PLAN 1
Pt has a history of ESRD on dialysis TTS, missed last 3 sessions, last HD 7/4  Pt with 1+ edema in RLE, lungs mildly crackles, saturating well on RA - not fluid overloaded  K 5.6 on admission - s/p Lokelma 10  - C/w hemodialysis per nephro    Nephrology  __ Consulted Pt has a history of ESRD on dialysis TTS, missed last 3 sessions, last HD 7/4  Pt with 1+ edema in RLE, lungs mildly crackles, saturating well on RA - not fluid overloaded  K 5.6 on admission - s/p Lokelma 10  - C/w hemodialysis per nephro    Nephrology Dr. Washington Consulted

## 2023-07-09 NOTE — H&P ADULT - HISTORY OF PRESENT ILLNESS
This is a 61 y/o M from [facility], walks with RW, with PMH of ESRD on HD (TTS), BKA- L w/prosthetic leg, DM, Left eye blindness, CAD, HTN, HLD, osteoporosis, bronchitis, current smoker, and anemia who presents with complaint of missed dialysis. Last HD 7/4. Pt states he goes to his dialysis sessions however they do not allow him to use the bathroom during dialysis so he does not get dialysis while he is there. Pt complains of right leg swelling and states he does not make any urine. Pt denies any fever, chills, N/V/D, constipation, dysuria, CP, SOB, numbness or tingling.  This is a 63 y/o M from [facility], walks with RW, with PMH of ESRD on HD (TTS), BKA- L w/prosthetic leg, DM, Left eye blindness, CHF, CAD, HTN, HLD, osteoporosis, bronchitis, current smoker, and anemia who presents with complaint of missed dialysis. Last HD 7/4. Pt states he goes to his dialysis sessions however they do not allow him to use the bathroom during dialysis so he does not get dialysis while he is there. Pt complains of right leg swelling and states he does not make any urine. Pt denies any fever, chills, N/V/D, constipation, dysuria, CP, SOB, numbness or tingling.

## 2023-07-09 NOTE — PATIENT PROFILE ADULT - FALL HARM RISK - HARM RISK INTERVENTIONS
Assistance with ambulation/Assistance OOB with selected safe patient handling equipment/Communicate Risk of Fall with Harm to all staff/Discuss with provider need for PT consult/Monitor gait and stability/Provide patient with walking aids - walker, cane, crutches/Reinforce activity limits and safety measures with patient and family/Tailored Fall Risk Interventions/Use of alarms - bed, chair and/or voice tab/Visual Cue: Yellow wristband and red socks/Bed in lowest position, wheels locked, appropriate side rails in place/Call bell, personal items and telephone in reach/Instruct patient to call for assistance before getting out of bed or chair/Non-slip footwear when patient is out of bed/[place] to call system/Physically safe environment - no spills, clutter or unnecessary equipment/Purposeful Proactive Rounding/Room/bathroom lighting operational, light cord in reach patient refused red socks and fall risk bracelet/Assistance with ambulation/Assistance OOB with selected safe patient handling equipment/Communicate Risk of Fall with Harm to all staff/Discuss with provider need for PT consult/Monitor gait and stability/Provide patient with walking aids - walker, cane, crutches/Reinforce activity limits and safety measures with patient and family/Tailored Fall Risk Interventions/Use of alarms - bed, chair and/or voice tab/Visual Cue: Yellow wristband and red socks/Bed in lowest position, wheels locked, appropriate side rails in place/Call bell, personal items and telephone in reach/Instruct patient to call for assistance before getting out of bed or chair/Non-slip footwear when patient is out of bed/[place] to call system/Physically safe environment - no spills, clutter or unnecessary equipment/Purposeful Proactive Rounding/Room/bathroom lighting operational, light cord in reach

## 2023-07-09 NOTE — H&P ADULT - ATTENDING COMMENTS
PATIENT IS SEEN AND EXAMINED       # MISSING HD FOR MORE THAN A WEEK - PATIENT IS BEING SCHEDULED FOR HD IN HOSPITAL ( [facility] TEAM )    # BEHAVIOR PROBLEMS - RECURRENT     # GI AND DVT PROPHYLAXIS    DR. COLIN MAC

## 2023-07-09 NOTE — H&P ADULT - PROBLEM SELECTOR PLAN 2
Pt has a history of HTN, takes Hydralazine 25mg TID, metoprolol succinate 50mg, Norvasc 10mg at home  - C/w hydralazine, metoprolol and Norvasc

## 2023-07-09 NOTE — H&P ADULT - ASSESSMENT
63 y/o M from [facility], walks with RW, with PMH of ESRD on HD (TTS), BKA- L w/prosthetic leg, DM, Left eye blindness, CAD, HTN, HLD, osteoporosis, bronchitis, current smoker, and anemia who presents with complaint of missed dialysis. Admitted for dialysis treatment.  63 y/o M from [facility], walks with RW, with PMH of ESRD on HD (TTS), BKA- L w/prosthetic leg, DM, Left eye blindness, CHF, CAD, HTN, HLD, osteoporosis, bronchitis, current smoker, and anemia who presents with complaint of missed dialysis. Admitted for dialysis treatment.

## 2023-07-09 NOTE — ED ADULT NURSE NOTE - NSFALLRISKINTERV_ED_ALL_ED

## 2023-07-09 NOTE — H&P ADULT - NSHPREVIEWOFSYSTEMS_GEN_ALL_CORE
CONSTITUTIONAL: No fever, weight loss, or fatigue  RESPIRATORY: No cough, wheezing, chills or hemoptysis; No shortness of breath  CARDIOVASCULAR: No chest pain, palpitations, dizziness. + leg swelling  GASTROINTESTINAL: No abdominal pain. No nausea, vomiting, or hematemesis; No diarrhea or constipation. No melena or hematochezia.  GENITOURINARY: No dysuria or hematuria, urinary frequency  NEUROLOGICAL: No headaches, memory loss, loss of strength, numbness, or tremors  ENDOCRINE: No polyuria, polydipsia, or heat/cold intolerance  MUSCULOSKELETAL: No muscle aches, joint pains  HEME: no easy bruisability, no tender or enlarged lymph nodes  SKIN: No itching, burning, rashes, or lesions .
No

## 2023-07-10 NOTE — CONSULT NOTE ADULT - ASSESSMENT
Patient is a 62y Male with ESRD on HD TTS at [facility], whom presented to the hospital as missed dialysis since discharge from this hospital last week.    In ED noted to have hyperkalemia and is lethargic today. renal consulted fro ESRD.      # ESRD- missed HD, resume HD today with 1K+ bath at end of HD as with hyperkalemia  # CKDMBD. phos at goal off binders  # anemia of CKD. transfused last week. epogen on HD     thanks for the consult

## 2023-07-10 NOTE — PROGRESS NOTE ADULT - SUBJECTIVE AND OBJECTIVE BOX
NP Note discussed with  primary attending    Patient is a 62y old  Male who presents with a chief complaint of Missed dialysis (10 Jul 2023 09:22)      INTERVAL HPI/OVERNIGHT EVENTS: No acute medical events overnight     MEDICATIONS  (STANDING):  ALPRAZolam 0.25 milliGRAM(s) Oral at bedtime  amLODIPine   Tablet 10 milliGRAM(s) Oral daily  aspirin enteric coated 81 milliGRAM(s) Oral daily  atorvastatin 40 milliGRAM(s) Oral at bedtime  budesonide 160 MICROgram(s)/formoterol 4.5 MICROgram(s) Inhaler 2 Puff(s) Inhalation two times a day  epoetin [place] (PROCRIT) Injectable 74866 Unit(s) IV Push <User Schedule>  folic acid 1 milliGRAM(s) Oral daily  furosemide    Tablet 80 milliGRAM(s) Oral daily  heparin   Injectable 5000 Unit(s) SubCutaneous every 8 hours  hydrALAZINE 25 milliGRAM(s) Oral three times a day  insulin lispro (ADMELOG) corrective regimen sliding scale   SubCutaneous Before meals and at bedtime  latanoprost 0.005% Ophthalmic Solution 1 Drop(s) Both EYES at bedtime  levothyroxine 25 MICROGram(s) Oral daily  LORazepam     Tablet 0.5 milliGRAM(s) Oral <User Schedule>  melatonin 5 milliGRAM(s) Oral at bedtime  metolazone 10 milliGRAM(s) Oral daily  metoprolol tartrate 25 milliGRAM(s) Oral two times a day  nicotine -   7 mG/24Hr(s) Patch 1 Patch Transdermal daily  pantoprazole    Tablet 40 milliGRAM(s) Oral before breakfast  sodium zirconium cyclosilicate 10 Gram(s) Oral <User Schedule>  sucralfate 1 Gram(s) Oral four times a day    MEDICATIONS  (PRN):  acetaminophen     Tablet .. 650 milliGRAM(s) Oral every 6 hours PRN Temp greater or equal to 38C (100.4F), Mild Pain (1 - 3)  albuterol    90 MICROgram(s) HFA Inhaler 2 Puff(s) Inhalation every 6 hours PRN for shortness of breath and/or wheezing  aluminum hydroxide/magnesium hydroxide/simethicone Suspension 30 milliLiter(s) Oral every 4 hours PRN Dyspepsia  melatonin 3 milliGRAM(s) Oral at bedtime PRN Insomnia  ondansetron Injectable 4 milliGRAM(s) IV Push every 8 hours PRN Nausea and/or Vomiting  oxycodone    5 mG/acetaminophen 325 mG 1 Tablet(s) Oral every 6 hours PRN Severe Pain (7 - 10)      __________________________________________________  REVIEW OF SYSTEMS:  limited due to mental status     Vital Signs Last 24 Hrs  T(C): 36.3 (10 Jul 2023 05:27), Max: 37.1 (09 Jul 2023 13:19)  T(F): 97.3 (10 Jul 2023 05:27), Max: 98.7 (09 Jul 2023 13:19)  HR: 68 (10 Jul 2023 05:27) (68 - 77)  BP: 135/69 (10 Jul 2023 05:27) (129/69 - 153/84)  BP(mean): --  RR: 18 (10 Jul 2023 05:27) (18 - 19)  SpO2: 96% (10 Jul 2023 05:27) (93% - 100%)    Parameters below as of 10 Jul 2023 05:27  Patient On (Oxygen Delivery Method): room air        ________________________________________________  PHYSICAL EXAM: limited 2/2 patient's participation  GENERAL: NAD, withdrawn   HEENT: Normocephalic;  conjunctivae and sclerae clear; moist mucous membranes;   NECK : supple  CHEST/LUNG: diminished 2/2 poor effort    HEART: S1 S2  regular; no murmurs, gallops or rubs  ABDOMEN: Soft, Nontender, Nondistended; Bowel sounds present  EXTREMITIES: L bka right LE + 2 pitting edema, avf + thrill + bruit  SKIN: warm and dry; no rash  NERVOUS SYSTEM:  Awake and alert; Oriented  to place, person and time  _________________________________________________  LABS:                        8.7    3.16  )-----------( 114      ( 09 Jul 2023 14:00 )             28.1     07-09    135  |  102  |  68<H>  ----------------------------<  144<H>  5.6<H>   |  22  |  15.90<H>    Ca    8.0<L>      09 Jul 2023 14:00  Phos  5.3     07-09  Mg     3.1     07-09    TPro  6.5  /  Alb  2.8<L>  /  TBili  0.4  /  DBili  x   /  AST  25  /  ALT  31  /  AlkPhos  84  07-09      Urinalysis Basic - ( 09 Jul 2023 14:00 )    Color: x / Appearance: x / SG: x / pH: x  Gluc: 144 mg/dL / Ketone: x  / Bili: x / Urobili: x   Blood: x / Protein: x / Nitrite: x   Leuk Esterase: x / RBC: x / WBC x   Sq Epi: x / Non Sq Epi: x / Bacteria: x      CAPILLARY BLOOD GLUCOSE      POCT Blood Glucose.: 101 mg/dL (10 Jul 2023 07:51)  POCT Blood Glucose.: 158 mg/dL (09 Jul 2023 21:53)  POCT Blood Glucose.: 125 mg/dL (09 Jul 2023 16:48)    RADIOLOGY & ADDITIONAL TESTS: < from: Xray Chest 1 View AP/PA (07.01.23 @ 11:16) >    IMPRESSION:  Bilateral RIGHT greater than LEFT multifocal and diffuse airspace   opacities and/or RIGHT effusion layering along posterior thoracicwall...    --- End of Report ---    < end of copied text >      Imaging Personally Reviewed:  YES/    Consultant(s) Notes Reviewed:   YES/    Care Discussed with Consultants : nephrology      Plan of care was discussed with patient and /or primary care giver; all questions and concerns were addressed and care was aligned with patient's wishes.

## 2023-07-10 NOTE — CONSULT NOTE ADULT - ASSESSMENT
Patient is a 62M with a PMHx of ESRD (on HD, TTHS), T2DM, CAD s/p MI, CHF, glaucoma (legally blind), HTN, orthostatic hypotension, hyperparathyroid, hyperkalemia, LS spinal stenosis, osteoarthritis, osteoporosis, PAD, esophagitis, Hx of osteomyelitis of thoracic vertebrae, remote hx of crack-cocaine use disorder, L BKA, bronchitis, chronic anemia, DELROY, double duct sign, and current smoker, who presented to the ED due to missed HD session on 7/4. GI was consulted for anemia.     Patient is known to the GI service. Recent admission 7/1-7/3/23 for symptomatic anemia, underwent dialysis on the day of discharge, Hgb 6.8 s/p transfusion during dialysis.   EGD (01/2021): LA grade D esophagitis, bx unremarkable  EGD (8/3/22) for DELROY, dysphagia: lg amt of food in gastric body, aborted  [place] (8/3/22): poor bowel prep, aborted.  Inpatient GI eval 2/22/23 for double duct sign on CTAP: MRCP noncon done, outpatient GI follow up  Inpatient GI 3/21/23 for hematemesis: PPI BID, carafate TID x 1 week, EUS vs surveillance MRI outpatient    Patient reports last HD was on 7/6. Offers no acute complaints other than "to get out of here" and intermittent diarrhea, had one episode yesterday, otherwise often self resolves, not associated with abdominal pain/tenderness. He has a good appetite. Denies cp, sob, hematemesis, n/v, hematochezia, or melena.   In the ED, K 5.6, BUN 68, Cr 15.9, Alb 2.8, BNP > 175,000, Hgb 8.7, plt 114.     #Hx of esophagitis  #DELROY  #Double duct sign  #Hx of dysphagia (resolved)  Patient presented after missing dialysis session however reports last HD was 7/6. Nephrology consulted, plan for HD today, 7/10. Of note, Hgb on 7/3 was 6.8, transfused during dialysis the same day before discharge. Assuringly, his Hgb on admission is 8.7, no overt signs of bleeding. Likely anemia of chronic disease (hx of ESRD, responds appropriately with transfusions, also on epogen) vs active bleeding (denies hematemesis, hematochezia, melena, and hematuria).   Re double duct sign noted on CTAP 02/22/23, MRCP done, labs from that admission showed AFP 8.0 wnl, CEA 9.4H, CA 19.9 <2. Recommended outpatient GI follow up for further evaluation.     	- Conservative management  	- Maintain active T&S, 2 large bore peripheral IVs, transfuse for goal Hgb >7 or if symptomatic  	- Trend H/H  	- IV/PO Protonix 40mg daily  	- Agree with carafate 1g four times daily   	- Monitor for s/sx of bleeding  	- Outpatient GI follow up for repeat EGD/colonoscopy +/- EUS for further evaluation of double duct sign, surveillance MRI can be done outpatient       This note and its recommendations herein are preliminary until such time as cosigned by an attending.    Thank you for this consult!

## 2023-07-10 NOTE — CONSULT NOTE ADULT - SUBJECTIVE AND OBJECTIVE BOX
[facility]    Patient is a 62y old  Male who presents with a chief complaint of Missed dialysis (10 Jul 2023 12:12)    HPI:  This is a 61 y/o M from [facility], walks with RW, with PMH of ESRD on HD (TTS), BKA- L w/prosthetic leg, DM, Left eye blindness, CHF, CAD, HTN, HLD, osteoporosis, bronchitis, current smoker, and anemia who presents with complaint of missed dialysis. Last HD 7/4. Pt states he goes to his dialysis sessions however they do not allow him to use the bathroom during dialysis so he does not get dialysis while he is there. Pt complains of right leg swelling and states he does not make any urine. Pt denies any fever, chills, N/V/D, constipation, dysuria, CP, SOB, numbness or tingling.  (09 Jul 2023 15:48)        PMH/PSH:  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      Osteoarthritis      Vision loss of left eye  blind      ESRD on hemodialysis  T/Th/S      Cataract  both eyes - hx of sx done      BPH (benign prostatic hyperplasia)      UTI (urinary tract infection)  hx of      Bladder mass  hx of      H/O hematuria      Osteoporosis      Vision loss of right eye  decreased      Depression      Chronic GERD      Osteomyelitis of vertebra      CHF (congestive heart failure)      Below knee amputation status, left  2012- pt is wearing prostesis      History of right cataract extraction      History of left cataract extraction      S/P arteriovenous (AV) fistula creation  right arm brachiocephalic arteriovenous fistula on 11/08/2018      H/O hematuria  s/p bladder bx and fulguration 2/25/2020      H/O transurethral destruction of bladder lesion  2020      History of excision of mass  back mass on 03/31/2021            FH:  FAMILY HISTORY:  Family history of cirrhosis of liver (Mother)    Family history of renal failure (Sibling)    Family history of hypertension (Sibling)    Family history of diabetes mellitus (Sibling)    History of substance abuse in sibling (Sibling)          MEDS:  MEDICATIONS  (STANDING):  ALPRAZolam 0.25 milliGRAM(s) Oral at bedtime  amLODIPine   Tablet 10 milliGRAM(s) Oral daily  aspirin enteric coated 81 milliGRAM(s) Oral daily  atorvastatin 40 milliGRAM(s) Oral at bedtime  budesonide 160 MICROgram(s)/formoterol 4.5 MICROgram(s) Inhaler 2 Puff(s) Inhalation two times a day  chlorhexidine 2% Cloths 1 Application(s) Topical <User Schedule>  epoetin beverley (PROCRIT) Injectable 18230 Unit(s) IV Push <User Schedule>  folic acid 1 milliGRAM(s) Oral daily  furosemide    Tablet 80 milliGRAM(s) Oral daily  heparin   Injectable 5000 Unit(s) SubCutaneous every 8 hours  hydrALAZINE 25 milliGRAM(s) Oral three times a day  insulin lispro (ADMELOG) corrective regimen sliding scale   SubCutaneous Before meals and at bedtime  latanoprost 0.005% Ophthalmic Solution 1 Drop(s) Both EYES at bedtime  levothyroxine 25 MICROGram(s) Oral daily  LORazepam     Tablet 0.5 milliGRAM(s) Oral <User Schedule>  melatonin 5 milliGRAM(s) Oral at bedtime  metolazone 10 milliGRAM(s) Oral daily  metoprolol tartrate 25 milliGRAM(s) Oral two times a day  nicotine -   7 mG/24Hr(s) Patch 1 Patch Transdermal daily  pantoprazole    Tablet 40 milliGRAM(s) Oral before breakfast  sodium zirconium cyclosilicate 10 Gram(s) Oral <User Schedule>  sucralfate 1 Gram(s) Oral four times a day    MEDICATIONS  (PRN):  acetaminophen     Tablet .. 650 milliGRAM(s) Oral every 6 hours PRN Temp greater or equal to 38C (100.4F), Mild Pain (1 - 3)  albuterol    90 MICROgram(s) HFA Inhaler 2 Puff(s) Inhalation every 6 hours PRN for shortness of breath and/or wheezing  aluminum hydroxide/magnesium hydroxide/simethicone Suspension 30 milliLiter(s) Oral every 4 hours PRN Dyspepsia  melatonin 3 milliGRAM(s) Oral at bedtime PRN Insomnia  ondansetron Injectable 4 milliGRAM(s) IV Push every 8 hours PRN Nausea and/or Vomiting  oxycodone    5 mG/acetaminophen 325 mG 1 Tablet(s) Oral every 6 hours PRN Severe Pain (7 - 10)    Allergies    No Known Drug Allergies  fish (Rash)  liver (Anaphylaxis)    Intolerances          ROS: A detailed set of ROS were asked and negative except those outlined in GI HPI.  ______________________________________________________________________  PHYSICAL EXAM:  T(C): 36.3 (07-10-23 @ 05:27), Max: 37.1 (07-09-23 @ 13:19)  HR: 68 (07-10-23 @ 05:27)  BP: 135/69 (07-10-23 @ 05:27)  RR: 18 (07-10-23 @ 05:27)  SpO2: 96% (07-10-23 @ 05:27)  Wt(kg): --    07-09  -  07-10  --------------------------------------------------------  IN:  Total IN: 0 mL    OUT:    Stool (mL): 2 mL    Voided (mL): 0 mL  Total OUT: 2 mL    Total NET: -2 mL          GEN: NAD  HEENT: EOMI, conjunctivae anicteric, neck supple, moist mucous membranes  PULM: LSCTAB, no wheezing, rales, or rhonchi  CV: RRR, no m/r/b  GI: Soft, NT, ND; +BS in all four quadrants, no ascites, no [place]'s sign  MSK: HUMPHREY, no edema  NEURO: A&O x 3, no gross deficits  ______________________________________________________________________  LABS:                        8.7    3.16  )-----------( 114      ( 09 Jul 2023 14:00 )             28.1     07-09    135  |  102  |  68<H>  ----------------------------<  144<H>  5.6<H>   |  22  |  15.90<H>    Ca    8.0<L>      09 Jul 2023 14:00  Phos  5.3     07-09  Mg     3.1     07-09    TPro  6.5  /  Alb  2.8<L>  /  TBili  0.4  /  DBili  x   /  AST  25  /  ALT  31  /  AlkPhos  84  07-09    LIVER FUNCTIONS - ( 09 Jul 2023 14:00 )  Alb: 2.8 g/dL / Pro: 6.5 g/dL / ALK PHOS: 84 U/L / ALT: 31 U/L DA / AST: 25 U/L / GGT: x             ____________________________________________    IMAGING:    No imaging done this admission.
[facility] : Progress Note :: [phone], (office [phone]),   Dr Mosher / Dr Washington / Dr Young / Dr Doll / Dr Varsha TURCIOS / Dr Tello / Dr Garcia / Dr Larry qiu  _____________________________________________________________________________________________  Patient is a 62y Male whom presented to the hospital as missed dialysis since discharge from this hospital last week.  In ED noted to have hyperkalemia and is lethargic today. renal consulted fro ESRD.      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      Osteoarthritis      Vision loss of left eye  blind      ESRD on hemodialysis  T/Th/S      Cataract  both eyes - hx of sx done      BPH (benign prostatic hyperplasia)      UTI (urinary tract infection)  hx of      Bladder mass  hx of      H/O hematuria      Osteoporosis      Vision loss of right eye  decreased      Depression      Chronic GERD      Osteomyelitis of vertebra      CHF (congestive heart failure)      Below knee amputation status, left  2012- pt is wearing prostesis      History of right cataract extraction      History of left cataract extraction      S/P arteriovenous (AV) fistula creation  right arm brachiocephalic arteriovenous fistula on 11/08/2018      H/O hematuria  s/p bladder bx and fulguration 2/25/2020      H/O transurethral destruction of bladder lesion  2020      History of excision of mass  back mass on 03/31/2021        No Known Drug Allergies  fish (Rash)  liver (Anaphylaxis)    Home Medications Reviewed  Hospital Medications:   MEDICATIONS  (STANDING):  ALPRAZolam 0.25 milliGRAM(s) Oral at bedtime  amLODIPine   Tablet 10 milliGRAM(s) Oral daily  aspirin enteric coated 81 milliGRAM(s) Oral daily  atorvastatin 40 milliGRAM(s) Oral at bedtime  budesonide 160 MICROgram(s)/formoterol 4.5 MICROgram(s) Inhaler 2 Puff(s) Inhalation two times a day  chlorhexidine 2% Cloths 1 Application(s) Topical <User Schedule>  epoetin beverley (PROCRIT) Injectable 63442 Unit(s) IV Push <User Schedule>  folic acid 1 milliGRAM(s) Oral daily  furosemide    Tablet 80 milliGRAM(s) Oral daily  heparin   Injectable 5000 Unit(s) SubCutaneous every 8 hours  hydrALAZINE 25 milliGRAM(s) Oral three times a day  insulin lispro (ADMELOG) corrective regimen sliding scale   SubCutaneous Before meals and at bedtime  latanoprost 0.005% Ophthalmic Solution 1 Drop(s) Both EYES at bedtime  levothyroxine 25 MICROGram(s) Oral daily  LORazepam     Tablet 0.5 milliGRAM(s) Oral <User Schedule>  melatonin 5 milliGRAM(s) Oral at bedtime  metolazone 10 milliGRAM(s) Oral daily  metoprolol tartrate 25 milliGRAM(s) Oral two times a day  nicotine -   7 mG/24Hr(s) Patch 1 Patch Transdermal daily  pantoprazole    Tablet 40 milliGRAM(s) Oral before breakfast  sodium zirconium cyclosilicate 10 Gram(s) Oral <User Schedule>  sucralfate 1 Gram(s) Oral four times a day    SOCIAL HISTORY:  Denies ETOh,Smoking,   FAMILY HISTORY:  Family history of cirrhosis of liver (Mother)    Family history of renal failure (Sibling)    Family history of hypertension (Sibling)    Family history of diabetes mellitus (Sibling)    History of substance abuse in sibling (Sibling)          VITALS:  T(F): 97.3 (07-10-23 @ 05:27), Max: 98.7 (07-09-23 @ 13:19)  HR: 68 (07-10-23 @ 05:27)  BP: 135/69 (07-10-23 @ 05:27)  RR: 18 (07-10-23 @ 05:27)  SpO2: 96% (07-10-23 @ 05:27)  Wt(kg): --    07-09 @ 07:01  -  07-10 @ 07:00  --------------------------------------------------------  IN: 0 mL / OUT: 2 mL / NET: -2 mL      Height (cm): 157.5 (07-09 @ 13:19)  PHYSICAL EXAM:  Constitutional: NAD  HEENT: anicteric sclera, oropharynx clear,  Neck: No JVD  Respiratory: CTAB, no wheezes, rales or rhonchi  Cardiovascular: S1, S2, RRR  Gastrointestinal: BS+, soft, NT/ND  Extremities:  No peripheral edema  Neurological: A/O x 3, no focal deficits  : No CVA tenderness. No [place].   Skin: No rashes  Vascular Access: AVF     LABS:  07-09    135  |  102  |  68<H>  ----------------------------<  144<H>  5.6<H>   |  22  |  15.90<H>    Ca    8.0<L>      09 Jul 2023 14:00  Phos  5.3     07-09  Mg     3.1     07-09    TPro  6.5  /  Alb  2.8<L>  /  TBili  0.4  /  DBili      /  AST  25  /  ALT  31  /  AlkPhos  84  07-09    Creatinine Trend: 15.90 <--                        8.7    3.16  )-----------( 114      ( 09 Jul 2023 14:00 )             28.1     Urine Studies:  Urinalysis Basic - ( 09 Jul 2023 14:00 )    Color:  / Appearance:  / SG:  / pH:   Gluc: 144 mg/dL / Ketone:   / Bili:  / Urobili:    Blood:  / Protein:  / Nitrite:    Leuk Esterase:  / RBC:  / WBC    Sq Epi:  / Non Sq Epi:  / Bacteria:         RADIOLOGY & ADDITIONAL STUDIES:

## 2023-07-10 NOTE — PROGRESS NOTE ADULT - SUBJECTIVE AND OBJECTIVE BOX
Patient is a 62y old  Male who presents with a chief complaint of Missed dialysis (09 Jul 2023 15:48)    PATIENT IS SEEN AND EXAMINED IN MEDICAL FLOOR.  MARIIT [    ]    JEREMY [   ]      GT [   ]    ALLERGIES:  No Known Drug Allergies  fish (Rash)  liver (Anaphylaxis)      Daily Height in cm: 157.48 (09 Jul 2023 13:19)    Daily     VITALS:    Vital Signs Last 24 Hrs  T(C): 36.3 (10 Jul 2023 05:27), Max: 37.1 (09 Jul 2023 13:19)  T(F): 97.3 (10 Jul 2023 05:27), Max: 98.7 (09 Jul 2023 13:19)  HR: 68 (10 Jul 2023 05:27) (68 - 77)  BP: 135/69 (10 Jul 2023 05:27) (129/69 - 153/84)  BP(mean): --  RR: 18 (10 Jul 2023 05:27) (18 - 19)  SpO2: 96% (10 Jul 2023 05:27) (93% - 100%)    Parameters below as of 10 Jul 2023 05:27  Patient On (Oxygen Delivery Method): room air        LABS:    CBC Full  -  ( 09 Jul 2023 14:00 )  WBC Count : 3.16 K/uL  RBC Count : 3.14 M/uL  Hemoglobin : 8.7 g/dL  Hematocrit : 28.1 %  Platelet Count - Automated : 114 K/uL  Mean Cell Volume : 89.5 fl  Mean Cell Hemoglobin : 27.7 pg  Mean Cell Hemoglobin Concentration : 31.0 gm/dL  Auto Neutrophil # : 2.58 K/uL  Auto Lymphocyte # : 0.28 K/uL  Auto Monocyte # : 0.14 K/uL  Auto Eosinophil # : 0.14 K/uL  Auto Basophil # : 0.01 K/uL  Auto Neutrophil % : 81.7 %  Auto Lymphocyte % : 8.9 %  Auto Monocyte % : 4.4 %  Auto Eosinophil % : 4.4 %  Auto Basophil % : 0.3 %      07-09    135  |  102  |  68<H>  ----------------------------<  144<H>  5.6<H>   |  22  |  15.90<H>    Ca    8.0<L>      09 Jul 2023 14:00  Phos  5.3     07-09  Mg     3.1     07-09    TPro  6.5  /  Alb  2.8<L>  /  TBili  0.4  /  DBili  x   /  AST  25  /  ALT  31  /  AlkPhos  84  07-09    CAPILLARY BLOOD GLUCOSE      POCT Blood Glucose.: 101 mg/dL (10 Jul 2023 07:51)  POCT Blood Glucose.: 158 mg/dL (09 Jul 2023 21:53)  POCT Blood Glucose.: 125 mg/dL (09 Jul 2023 16:48)        LIVER FUNCTIONS - ( 09 Jul 2023 14:00 )  Alb: 2.8 g/dL / Pro: 6.5 g/dL / ALK PHOS: 84 U/L / ALT: 31 U/L DA / AST: 25 U/L / GGT: x           Creatinine Trend: 15.90<--, 10.60<--, 10.80<--  I&O's Summary    09 Jul 2023 07:01  -  10 Jul 2023 07:00  --------------------------------------------------------  IN: 0 mL / OUT: 2 mL / NET: -2 mL            .Blood Blood-Peripheral  05-28 @ 14:07   No Growth Final  --  --          MEDICATIONS:    MEDICATIONS  (STANDING):  ALPRAZolam 0.25 milliGRAM(s) Oral at bedtime  amLODIPine   Tablet 10 milliGRAM(s) Oral daily  aspirin enteric coated 81 milliGRAM(s) Oral daily  atorvastatin 40 milliGRAM(s) Oral at bedtime  budesonide 160 MICROgram(s)/formoterol 4.5 MICROgram(s) Inhaler 2 Puff(s) Inhalation two times a day  epoetin beverley (PROCRIT) Injectable 79631 Unit(s) IV Push <User Schedule>  folic acid 1 milliGRAM(s) Oral daily  furosemide    Tablet 80 milliGRAM(s) Oral daily  heparin   Injectable 5000 Unit(s) SubCutaneous every 8 hours  hydrALAZINE 25 milliGRAM(s) Oral three times a day  insulin lispro (ADMELOG) corrective regimen sliding scale   SubCutaneous Before meals and at bedtime  latanoprost 0.005% Ophthalmic Solution 1 Drop(s) Both EYES at bedtime  levothyroxine 25 MICROGram(s) Oral daily  LORazepam     Tablet 0.5 milliGRAM(s) Oral <User Schedule>  melatonin 5 milliGRAM(s) Oral at bedtime  metolazone 10 milliGRAM(s) Oral daily  metoprolol tartrate 25 milliGRAM(s) Oral two times a day  nicotine -   7 mG/24Hr(s) Patch 1 Patch Transdermal daily  pantoprazole    Tablet 40 milliGRAM(s) Oral before breakfast  sodium zirconium cyclosilicate 10 Gram(s) Oral <User Schedule>  sucralfate 1 Gram(s) Oral four times a day      MEDICATIONS  (PRN):  acetaminophen     Tablet .. 650 milliGRAM(s) Oral every 6 hours PRN Temp greater or equal to 38C (100.4F), Mild Pain (1 - 3)  albuterol    90 MICROgram(s) HFA Inhaler 2 Puff(s) Inhalation every 6 hours PRN for shortness of breath and/or wheezing  aluminum hydroxide/magnesium hydroxide/simethicone Suspension 30 milliLiter(s) Oral every 4 hours PRN Dyspepsia  melatonin 3 milliGRAM(s) Oral at bedtime PRN Insomnia  ondansetron Injectable 4 milliGRAM(s) IV Push every 8 hours PRN Nausea and/or Vomiting  oxycodone    5 mG/acetaminophen 325 mG 1 Tablet(s) Oral every 6 hours PRN Severe Pain (7 - 10)      REVIEW OF SYSTEMS:                           ALL ROS DONE [ X   ]    CONSTITUTIONAL:  LETHARGIC [   ], FEVER [   ], UNRESPONSIVE [   ]  CVS:  CP  [   ], SOB, [   ], PALPITATIONS [   ], DIZZYNESS [   ]  RS: COUGH [   ], SPUTUM [   ]  GI: ABDOMINAL PAIN [   ], NAUSEA [   ], VOMITINGS [   ], DIARRHEA [   ], CONSTIPATION [   ]  :  DYSURIA [   ], NOCTURIA [   ], INCREASED FREQUENCY [   ], DRIBLING [   ],  SKELETAL: PAINFUL JOINTS [   ], SWOLLEN JOINTS [   ], NECK ACHE [   ], LOW BACK ACHE [   ],  SKIN : ULCERS [   ], RASH [   ], ITCHING [   ]  CNS: HEAD ACHE [   ], DOUBLE VISION [   ], BLURRED VISION [   ], AMS / CONFUSION [   ], SEIZURES [   ], WEAKNESS [   ],TINGLING / NUMBNESS [   ]    PHYSICAL EXAMINATION:  GENERAL APPEARANCE: NO DISTRESS  HEENT:  NO PALLOR, NO  JVD,  NO   NODES, NECK SUPPLE  CVS: S1 +, S2 +,   RS: AEEB,  OCCASIONAL  RALES +,   NO RONCHI  ABD: SOFT, NT, NO, BS +  EXT: NO PE  SKIN: WARM,   SKELETAL:  ROM ACCEPTABLE  CNS:  AAO X    ,   DEFICITS    RADIOLOGY :      ASSESSMENT :     End-stage renal disease    No pertinent past medical history    Hypertension    Adrenal insufficiency    CKD (chronic kidney disease)    Anemia    Glaucoma    Coronary artery disease    HLD (hyperlipidemia)    Peripheral vascular disease    Spinal stenosis of lumbosacral region    Hyperparathyroidism    Diabetes mellitus    Diabetic neuropathy    Contracture of hand    Osteoarthritis    Osteoporosis    Vision loss of left eye    ESRD on hemodialysis    Cataract    BPH (benign prostatic hyperplasia)    UTI (urinary tract infection)    Bladder mass    H/O hematuria    Osteoporosis    Vision loss of right eye    Depression    Chronic GERD    Osteomyelitis of vertebra    CHF (congestive heart failure)    No significant past surgical history    Below knee amputation status, left    History of right cataract extraction    History of left cataract extraction    S/P arteriovenous (AV) fistula creation    H/O hematuria    H/O transurethral destruction of bladder lesion    History of excision of mass        PLAN:  HPI:  This is a 61 y/o M from [facility], walks with RW, with PMH of ESRD on HD (TTS), BKA- L w/prosthetic leg, DM, Left eye blindness, CHF, CAD, HTN, HLD, osteoporosis, bronchitis, current smoker, and anemia who presents with complaint of missed dialysis. Last HD 7/4. Pt states he goes to his dialysis sessions however they do not allow him to use the bathroom during dialysis so he does not get dialysis while he is there. Pt complains of right leg swelling and states he does not make any urine. Pt denies any fever, chills, N/V/D, constipation, dysuria, CP, SOB, numbness or tingling.  (09 Jul 2023 15:48)    -      Patient is a 62y old  Male who presents with a chief complaint of Missed dialysis (09 Jul 2023 15:48)    PATIENT IS SEEN AND EXAMINED IN MEDICAL FLOOR.      ALLERGIES:  No Known Drug Allergies  fish (Rash)  liver (Anaphylaxis)      Daily Height in cm: 157.48 (09 Jul 2023 13:19)    Daily     VITALS:    Vital Signs Last 24 Hrs  T(C): 36.3 (10 Jul 2023 05:27), Max: 37.1 (09 Jul 2023 13:19)  T(F): 97.3 (10 Jul 2023 05:27), Max: 98.7 (09 Jul 2023 13:19)  HR: 68 (10 Jul 2023 05:27) (68 - 77)  BP: 135/69 (10 Jul 2023 05:27) (129/69 - 153/84)  BP(mean): --  RR: 18 (10 Jul 2023 05:27) (18 - 19)  SpO2: 96% (10 Jul 2023 05:27) (93% - 100%)    Parameters below as of 10 Jul 2023 05:27  Patient On (Oxygen Delivery Method): room air        LABS:    CBC Full  -  ( 09 Jul 2023 14:00 )  WBC Count : 3.16 K/uL  RBC Count : 3.14 M/uL  Hemoglobin : 8.7 g/dL  Hematocrit : 28.1 %  Platelet Count - Automated : 114 K/uL  Mean Cell Volume : 89.5 fl  Mean Cell Hemoglobin : 27.7 pg  Mean Cell Hemoglobin Concentration : 31.0 gm/dL  Auto Neutrophil # : 2.58 K/uL  Auto Lymphocyte # : 0.28 K/uL  Auto Monocyte # : 0.14 K/uL  Auto Eosinophil # : 0.14 K/uL  Auto Basophil # : 0.01 K/uL  Auto Neutrophil % : 81.7 %  Auto Lymphocyte % : 8.9 %  Auto Monocyte % : 4.4 %  Auto Eosinophil % : 4.4 %  Auto Basophil % : 0.3 %      07-09    135  |  102  |  68<H>  ----------------------------<  144<H>  5.6<H>   |  22  |  15.90<H>    Ca    8.0<L>      09 Jul 2023 14:00  Phos  5.3     07-09  Mg     3.1     07-09    TPro  6.5  /  Alb  2.8<L>  /  TBili  0.4  /  DBili  x   /  AST  25  /  ALT  31  /  AlkPhos  84  07-09    CAPILLARY BLOOD GLUCOSE      POCT Blood Glucose.: 101 mg/dL (10 Jul 2023 07:51)  POCT Blood Glucose.: 158 mg/dL (09 Jul 2023 21:53)  POCT Blood Glucose.: 125 mg/dL (09 Jul 2023 16:48)        LIVER FUNCTIONS - ( 09 Jul 2023 14:00 )  Alb: 2.8 g/dL / Pro: 6.5 g/dL / ALK PHOS: 84 U/L / ALT: 31 U/L DA / AST: 25 U/L / GGT: x           Creatinine Trend: 15.90<--, 10.60<--, 10.80<--  I&O's Summary    09 Jul 2023 07:01  -  10 Jul 2023 07:00  --------------------------------------------------------  IN: 0 mL / OUT: 2 mL / NET: -2 mL            .Blood Blood-Peripheral  05-28 @ 14:07   No Growth Final  --  --          MEDICATIONS:    MEDICATIONS  (STANDING):  ALPRAZolam 0.25 milliGRAM(s) Oral at bedtime  amLODIPine   Tablet 10 milliGRAM(s) Oral daily  aspirin enteric coated 81 milliGRAM(s) Oral daily  atorvastatin 40 milliGRAM(s) Oral at bedtime  budesonide 160 MICROgram(s)/formoterol 4.5 MICROgram(s) Inhaler 2 Puff(s) Inhalation two times a day  epoetin beverley (PROCRIT) Injectable 60755 Unit(s) IV Push <User Schedule>  folic acid 1 milliGRAM(s) Oral daily  furosemide    Tablet 80 milliGRAM(s) Oral daily  heparin   Injectable 5000 Unit(s) SubCutaneous every 8 hours  hydrALAZINE 25 milliGRAM(s) Oral three times a day  insulin lispro (ADMELOG) corrective regimen sliding scale   SubCutaneous Before meals and at bedtime  latanoprost 0.005% Ophthalmic Solution 1 Drop(s) Both EYES at bedtime  levothyroxine 25 MICROGram(s) Oral daily  LORazepam     Tablet 0.5 milliGRAM(s) Oral <User Schedule>  melatonin 5 milliGRAM(s) Oral at bedtime  metolazone 10 milliGRAM(s) Oral daily  metoprolol tartrate 25 milliGRAM(s) Oral two times a day  nicotine -   7 mG/24Hr(s) Patch 1 Patch Transdermal daily  pantoprazole    Tablet 40 milliGRAM(s) Oral before breakfast  sodium zirconium cyclosilicate 10 Gram(s) Oral <User Schedule>  sucralfate 1 Gram(s) Oral four times a day      MEDICATIONS  (PRN):  acetaminophen     Tablet .. 650 milliGRAM(s) Oral every 6 hours PRN Temp greater or equal to 38C (100.4F), Mild Pain (1 - 3)  albuterol    90 MICROgram(s) HFA Inhaler 2 Puff(s) Inhalation every 6 hours PRN for shortness of breath and/or wheezing  aluminum hydroxide/magnesium hydroxide/simethicone Suspension 30 milliLiter(s) Oral every 4 hours PRN Dyspepsia  melatonin 3 milliGRAM(s) Oral at bedtime PRN Insomnia  ondansetron Injectable 4 milliGRAM(s) IV Push every 8 hours PRN Nausea and/or Vomiting  oxycodone    5 mG/acetaminophen 325 mG 1 Tablet(s) Oral every 6 hours PRN Severe Pain (7 - 10)      REVIEW OF SYSTEMS:                           ALL ROS DONE [ X   ]    CONSTITUTIONAL:  LETHARGIC [   ], FEVER [   ], UNRESPONSIVE [   ]  CVS:  CP  [   ], SOB, [   ], PALPITATIONS [   ], DIZZYNESS [   ]  RS: COUGH [   ], SPUTUM [   ]  GI: ABDOMINAL PAIN [   ], NAUSEA [   ], VOMITINGS [   ], DIARRHEA [   ], CONSTIPATION [   ]  :  DYSURIA [   ], NOCTURIA [   ], INCREASED FREQUENCY [   ], DRIBLING [   ],  SKELETAL: PAINFUL JOINTS [   ], SWOLLEN JOINTS [   ], NECK ACHE [   ], LOW BACK ACHE [   ],  SKIN : ULCERS [   ], RASH [   ], ITCHING [   ]  CNS: HEAD ACHE [   ], DOUBLE VISION [   ], BLURRED VISION [   ], AMS / CONFUSION [   ], SEIZURES [   ], WEAKNESS [   ],TINGLING / NUMBNESS [   ]    PHYSICAL EXAMINATION:  GENERAL APPEARANCE: NO DISTRESS  HEENT:  NO PALLOR, NO  JVD,  NO   NODES, NECK SUPPLE  CVS: S1 +, S2 +,   RS: AEEB,  OCCASIONAL  RALES +,   CRACKLES+ AT LUNG BASES  ABD: SOFT, NT, NO, BS +  EXT: LEFT BKA  SKIN: WARM,   SKELETAL:  ROM ACCEPTABLE  CNS:  AAO X  3      RADIOLOGY :      ASSESSMENT :     End-stage renal disease    No pertinent past medical history    Hypertension    Adrenal insufficiency    CKD (chronic kidney disease)    Anemia    Glaucoma    Coronary artery disease    HLD (hyperlipidemia)    Peripheral vascular disease    Spinal stenosis of lumbosacral region    Hyperparathyroidism    Diabetes mellitus    Diabetic neuropathy    Contracture of hand    Osteoarthritis    Osteoporosis    Vision loss of left eye    ESRD on hemodialysis    Cataract    BPH (benign prostatic hyperplasia)    UTI (urinary tract infection)    Bladder mass    H/O hematuria    Osteoporosis    Vision loss of right eye    Depression    Chronic GERD    Osteomyelitis of vertebra    CHF (congestive heart failure)    No significant past surgical history    Below knee amputation status, left    History of right cataract extraction    History of left cataract extraction    S/P arteriovenous (AV) fistula creation    H/O hematuria    H/O transurethral destruction of bladder lesion    History of excision of mass        PLAN:  HPI:  This is a 61 y/o M from [facility], walks with RW, with PMH of ESRD on HD (TTS), BKA- L w/prosthetic leg, DM, Left eye blindness, CHF, CAD, HTN, HLD, osteoporosis, bronchitis, current smoker, and anemia who presents with complaint of missed dialysis. Last HD 7/4. Pt states he goes to his dialysis sessions however they do not allow him to use the bathroom during dialysis so he does not get dialysis while he is there. Pt complains of right leg swelling and states he does not make any urine. Pt denies any fever, chills, N/V/D, constipation, dysuria, CP, SOB, numbness or tingling.  (09 Jul 2023 15:48)    # PATIENT W/ HISTORY OF ROUTINE NONCOMPLIANCE W/ LIFE-SUSTAINING TREATMENT [HD], EVALUATIONS AND INTERVENTIONS - COUNSELLED AT LENGTH TO REMAIN COMPLIANT. D/W PATIENT THAT PROGNOSIS POOR. HE VERBALIZED UNDERSTANDING. REGARDING GOC - PATIENT WISHES FOR FULL CODE    # ACUTE ON CHRONIC HYPOXIC RESPIRATORY FAILURE S/T PULMONARY EDEMA - S/T INCOMPLETE HD SESSIONS   # HX OF COPD + S/P RECENT MULTIFOCAL PNEUMONIA  # UNCONTROLLED HTN  # ESRD ON HD TTS - W/ HX OF NONCOMPLIANCE  # HYPERKALEMIA    - HYDRALAZINE, METOPROLOL AND NORVASC  - LOKELMA  - SUPPLEMENTAL O2  - PLANNED FOR HD  - NEPHROLOGY CONSULT  - ID CONSULT      # HX OF RECURRENT INTRACTABLE NAUSEA/VOMITING, GASTROPARESIS  # HISTORY OF ESOPHAGITIS AND DUODENITIS [1/2021], HX OF GASTROPARESIS W/ EVIDENCE OF THICKENED ESOPHAGUS ON PREVIOUS CT SCAN   # PANCREAS W/ DOUBLE DUCT SIGN    - DENIES RECENT HEMATEMESIS   - MONITORING HGB, PLACED ON PPI BID , CARAFATE TID  - CARAFATE, PRN ANTIEMETICS  - MONITORING FOR SYMPTOMS  - TOLERATING DIET ; PATIENT REPEATEDLY COUNSELLED TO CHEW FOOD CAUTIOUSLY AND CONSUME SMALL BITES W/ REGULAR CLEARING WITH WATER BETWEEN BITES.    - S/P RECENT PRBC TRANSFUSION     - GI CONSULT - RECOMMENDED OUTPATIENT F/U    # GASTROPARESIS  # UNDERLYING DM  - SSI + FS    # PATIENT UNDERGOING OUTPATIENT WORKUP FOR CENTRAL VENOUS STENOSIS THROUGH NEPHROLOGY TEAM    # ANEMIA OF CKD  # HX OF PANCYTOPENIA   - TREND HGB, TRANSFUSION THRESHOLD HGB < 7  - TYPE AND SCREEN  - ON EPO  - DENIES RECENT HEMATEMESIS, MELENA, HEMATOCHEZIA    - S/P RECENT PRBC TRANSFUSION    # SEVERE PROTEIN CALORIE MALNUTRITION, FAILURE TO THRIVE - NUTRITIONAL SUPPLEMENT    # HLD  # PARTIALLY BLIND  # S/P LEFT BKA  # HX OF PVD  # LS SPINAL STENOSIS  # GI AND DVT PPX

## 2023-07-11 NOTE — DISCHARGE NOTE PROVIDER - NSDCCPCAREPLAN_GEN_ALL_CORE_FT
PRINCIPAL DISCHARGE DIAGNOSIS  Diagnosis: Encounter for hemodialysis for ESRD  Assessment and Plan of Treatment: You were sent to hospital for missing dialysis sessions, Xray of chest was done and showed signs of fluid overload, you were evaluated by Nephrologist and dialysis was continued   It is strongly recommended that you continue the dialysis 3 times a week as recommended to avoid further complications      SECONDARY DISCHARGE DIAGNOSES  Diagnosis: ESRD with anemia  Assessment and Plan of Treatment: you blood count is lower than normal likely due to chronic diseases, you did not have any sign or symptoms of active bleeding   Follow up with GI outpatient for further recommendations    Diagnosis: Insulin-requiring or dependent type II diabetes mellitus  Assessment and Plan of Treatment: Type 2 diabetes means your pancreas does not make enough insulin, or your body does not use insulin well. Insulin helps move sugar out of the blood so it can be used for energy. Diabetes cannot be cured, but it can be managed.  Check your blood glucose 3 times a day and at bedtime and take Insulin as prescribed. Keep a log of your blood glucoses and bring it with you to the doctor's appointmnets.   The goal for blood sugar levels before meals is between 80 and 130 mg/dL and 2 hours after eating is lower than 180 mg/dL.  Make healthy food choices:  Carbohydrates can raise your blood sugar level if you eat too many at one time. Examples of foods that contain carbohydrates are breads, cereals, rice, pasta, and sweets.  If your Diabetes that is not well controlled it can lead to health problems. Examples include foot sores, retinopathy (vision loss), and peripheral neuropathy (loss of feeling in your hands and feet).       Diagnosis: HTN (hypertension)  Assessment and Plan of Treatment: Take all medication as prescribed.  Follow up with your medical doctor for routine blood pressure monitoring at your next visit.  Notify your doctor if you have any of the following symptoms:   Dizziness, Lightheadedness, Blurry vision, Headache, Chest pain, Shortness of breath

## 2023-07-11 NOTE — PROGRESS NOTE ADULT - PROBLEM SELECTOR PLAN 7
patient from [facility]  plan for HD today   pending GI consult
patient from [facility]  plan for HD today   requests discharge today, NCM/SW to follow   COVID 7/11

## 2023-07-11 NOTE — PROGRESS NOTE ADULT - SUBJECTIVE AND OBJECTIVE BOX
NP Note discussed with  primary attending    Patient is a 62y old  Male who presents with a chief complaint of Missed dialysis (11 Jul 2023 10:02)      INTERVAL HPI/OVERNIGHT EVENTS: plan for additional HD this AM     MEDICATIONS  (STANDING):  ALPRAZolam 0.25 milliGRAM(s) Oral at bedtime  amLODIPine   Tablet 10 milliGRAM(s) Oral daily  aspirin enteric coated 81 milliGRAM(s) Oral daily  atorvastatin 40 milliGRAM(s) Oral at bedtime  budesonide 160 MICROgram(s)/formoterol 4.5 MICROgram(s) Inhaler 2 Puff(s) Inhalation two times a day  chlorhexidine 2% Cloths 1 Application(s) Topical <User Schedule>  epoetin beverley (PROCRIT) Injectable 06607 Unit(s) IV Push <User Schedule>  folic acid 1 milliGRAM(s) Oral daily  furosemide    Tablet 80 milliGRAM(s) Oral daily  heparin   Injectable 5000 Unit(s) SubCutaneous every 8 hours  hydrALAZINE 25 milliGRAM(s) Oral three times a day  insulin lispro (ADMELOG) corrective regimen sliding scale   SubCutaneous Before meals and at bedtime  latanoprost 0.005% Ophthalmic Solution 1 Drop(s) Both EYES at bedtime  levothyroxine 25 MICROGram(s) Oral daily  LORazepam     Tablet 0.5 milliGRAM(s) Oral <User Schedule>  melatonin 5 milliGRAM(s) Oral at bedtime  metolazone 10 milliGRAM(s) Oral daily  metoprolol tartrate 25 milliGRAM(s) Oral two times a day  nicotine -   7 mG/24Hr(s) Patch 1 Patch Transdermal daily  pantoprazole    Tablet 40 milliGRAM(s) Oral before breakfast  sodium zirconium cyclosilicate 10 Gram(s) Oral <User Schedule>  sucralfate 1 Gram(s) Oral four times a day    MEDICATIONS  (PRN):  acetaminophen     Tablet .. 650 milliGRAM(s) Oral every 6 hours PRN Temp greater or equal to 38C (100.4F), Mild Pain (1 - 3)  albuterol    90 MICROgram(s) HFA Inhaler 2 Puff(s) Inhalation every 6 hours PRN for shortness of breath and/or wheezing  aluminum hydroxide/magnesium hydroxide/simethicone Suspension 30 milliLiter(s) Oral every 4 hours PRN Dyspepsia  melatonin 3 milliGRAM(s) Oral at bedtime PRN Insomnia  ondansetron Injectable 4 milliGRAM(s) IV Push every 8 hours PRN Nausea and/or Vomiting  oxycodone    5 mG/acetaminophen 325 mG 1 Tablet(s) Oral every 6 hours PRN Severe Pain (7 - 10)      __________________________________________________  REVIEW OF SYSTEMS:    CONSTITUTIONAL: No fever,   EYES: no acute visual disturbances  NECK: No pain or stiffness  RESPIRATORY: No cough; No shortness of breath  CARDIOVASCULAR: No chest pain, no palpitations  GASTROINTESTINAL: No pain. No nausea or vomiting; No diarrhea   NEUROLOGICAL: No headache or numbness, no tremors  MUSCULOSKELETAL: No joint pain, no muscle pain  GENITOURINARY: no dysuria, no frequency, no hesitancy  PSYCHIATRY: no depression , no anxiety  ALL OTHER  ROS negative        Vital Signs Last 24 Hrs  T(C): 36.6 (10 Jul 2023 21:51), Max: 36.7 (10 Jul 2023 16:45)  T(F): 97.9 (10 Jul 2023 21:51), Max: 98.1 (10 Jul 2023 16:45)  HR: 81 (10 Jul 2023 21:51) (67 - 92)  BP: 154/84 (10 Jul 2023 21:51) (127/67 - 160/71)  BP(mean): --  RR: 17 (10 Jul 2023 21:51) (17 - 18)  SpO2: 93% (10 Jul 2023 21:51) (93% - 97%)    Parameters below as of 10 Jul 2023 21:51  Patient On (Oxygen Delivery Method): room air        ________________________________________________  PHYSICAL EXAM:  GENERAL: NAD, chronically ill appearing   HEENT: Normocephalic;  conjunctivae and L sclerae clouded    NECK : supple  CHEST/LUNG: Clear to auscultations bilaterally with good air entry   HEART: S1 S2  regular; no murmurs, gallops or rubs  ABDOMEN: Soft, Nontender, Nondistended; Bowel sounds present  EXTREMITIES: L bka right LE + 2 pitting edema, RUE avf + thrill + bruit  SKIN: warm and dry; no rash  NERVOUS SYSTEM:  Awake and alert; Oriented  to place, person and time   _________________________________________________  LABS:                        7.6    2.72  )-----------( 96       ( 10 Jul 2023 15:25 )             24.1     07-10    132<L>  |  102  |  72<H>  ----------------------------<  89  6.2<HH>   |  21<L>  |  17.20<H>    Ca    7.8<L>      10 Jul 2023 15:25  Phos  5.6     07-10  Mg     3.0     07-10    TPro  6.0  /  Alb  2.4<L>  /  TBili  0.4  /  DBili  x   /  AST  26  /  ALT  30  /  AlkPhos  71  07-10      Urinalysis Basic - ( 10 Jul 2023 15:25 )    Color: x / Appearance: x / SG: x / pH: x  Gluc: 89 mg/dL / Ketone: x  / Bili: x / Urobili: x   Blood: x / Protein: x / Nitrite: x   Leuk Esterase: x / RBC: x / WBC x   Sq Epi: x / Non Sq Epi: x / Bacteria: x      CAPILLARY BLOOD GLUCOSE      POCT Blood Glucose.: 122 mg/dL (11 Jul 2023 07:50)  POCT Blood Glucose.: 237 mg/dL (10 Jul 2023 23:42)  POCT Blood Glucose.: 172 mg/dL (10 Jul 2023 11:19)    RADIOLOGY & ADDITIONAL TESTS:   < from: Xray Chest 1 View AP/PA (07.01.23 @ 11:16) >  IMPRESSION:  Bilateral RIGHT greater than LEFT multifocal and diffuse airspace   opacities and/or RIGHT effusion layering along posterior thoracicwall...    --- End of Report ---    < end of copied text >    Imaging Personally Reviewed:  YES/    Consultant(s) Notes Reviewed:   YES/    Care Discussed with Consultants: Nephro     Plan of care was discussed with patient and /or primary care giver; all questions and concerns were addressed and care was aligned with patient's wishes.

## 2023-07-11 NOTE — DISCHARGE NOTE PROVIDER - NSDCFUSCHEDAPPT_GEN_ALL_CORE_FT
[facility] Physician [facility]  VASCULAR [address]  Scheduled Appointment: 07/24/2023    Ambrocio Mason  [facility]  VASCULAR [address]  Scheduled Appointment: 07/24/2023    Carolyne Webster  [facility]  GASTRO OP [id number]  Scheduled Appointment: 08/24/2023

## 2023-07-11 NOTE — DISCHARGE NOTE NURSING/CASE MANAGEMENT/SOCIAL WORK - PATIENT PORTAL LINK FT
You can access the FollowMyHealth Patient Portal offered by [facility] by registering at the following website: [email or web address]. By joining [employer]’s FollowMyHealth portal, you will also be able to view your health information using other applications (apps) compatible with our system.

## 2023-07-11 NOTE — DISCHARGE NOTE PROVIDER - NSDCMRMEDTOKEN_GEN_ALL_CORE_FT
Albuterol (Eqv-ProAir HFA) 90 mcg/inh inhalation aerosol: 2 puff(s) inhaled every 6 hours as needed for  shortness of breath and/or wheezing  ALPRAZolam 0.25 mg oral tablet: 1 tab(s) orally once a day (at bedtime)  aspirin 81 mg oral tablet: 1 tab(s) orally once a day  Ativan 0.5 mg oral tablet: 1 tab(s) orally Tuesday, Thursday, Saturday  calcium (as carbonate) 500 mg oral tablet: 1 tab(s) orally once a day  Crestor 40 mg oral tablet: 1 tab(s) orally once a day (at bedtime)  cyanocobalamin 100 mcg oral tablet: 1 tab(s) orally once a day  epoetin beverley 20,000 units/mL injectable solution: 20,000 unit(s) intravenously Tuesday, Thursday, Saturday during dialysis  ferrous sulfate 325 mg (65 mg elemental iron) oral delayed release tablet: 1 tab(s) orally every other day  folic acid 1 mg oral tablet: 1 tab(s) orally once a day  hydrALAZINE 25 mg oral tablet: 1 tab(s) orally 3 times a day  Lantus 100 units/mL subcutaneous solution: 4 unit(s) subcutaneous once a day (at bedtime)  Lasix 80 mg oral tablet: 1 tab(s) orally once a day  latanoprost 0.005% ophthalmic solution: in each eye once a day (at bedtime)  Lokelma 10 g oral powder for reconstitution: 10 gram(s) orally Monday, Wednesday, and Friday  melatonin 6 mg oral tablet, disintegratin tab(s) orally once a day  metOLazone 10 mg oral tablet: 1 tab(s) orally once a day  metoprolol succinate 50 mg oral tablet, extended release: 1 tab(s) orally once a day  Norvasc 10 mg oral tablet: 1 tab(s) orally once a day  Percocet 5/325 oral tablet: 1 tab(s) orally every 6 hours  Protonix 40 mg oral delayed release tablet: 1 tab(s) orally once a day  Reglan 10 mg oral tablet: 1 tab(s) orally 3 times a day  sucralfate 1 g oral tablet: 1 tab(s) orally 4 times a day  Symbicort 160 mcg-4.5 mcg/inh inhalation aerosol: 2 puff(s) inhaled 2 times a day  Synthroid 25 mcg (0.025 mg) oral tablet: 1 tab(s) orally once a day   Albuterol (Eqv-ProAir HFA) 90 mcg/inh inhalation aerosol: 2 puff(s) inhaled every 6 hours as needed for  shortness of breath and/or wheezing  ALPRAZolam 0.25 mg oral tablet: 1 tab(s) orally once a day (at bedtime)  aspirin 81 mg oral tablet: 1 tab(s) orally once a day  Ativan 0.5 mg oral tablet: 1 tab(s) orally Tuesday, Thursday, Saturday  calcium (as carbonate) 500 mg oral tablet: 1 tab(s) orally once a day  Crestor 40 mg oral tablet: 1 tab(s) orally once a day (at bedtime)  cyanocobalamin 100 mcg oral tablet: 1 tab(s) orally once a day  epoetin beverley 20,000 units/mL injectable solution: 20,000 unit(s) intravenously Tuesday, Thursday, Saturday during dialysis  ferrous sulfate 325 mg (65 mg elemental iron) oral delayed release tablet: 1 tab(s) orally every other day  folic acid 1 mg oral tablet: 1 tab(s) orally once a day  hydrALAZINE 25 mg oral tablet: 1 tab(s) orally 3 times a day  Lantus 100 units/mL subcutaneous solution: 4 unit(s) subcutaneous once a day (at bedtime)  Lasix 80 mg oral tablet: 1 tab(s) orally once a day  latanoprost 0.005% ophthalmic solution: in each eye once a day (at bedtime)  Lokelma 10 g oral powder for reconstitution: 10 gram(s) orally Monday, Wednesday, and Friday  melatonin 6 mg oral tablet, disintegratin tab(s) orally once a day  metOLazone 10 mg oral tablet: 1 tab(s) orally once a day  metoprolol succinate 50 mg oral tablet, extended release: 1 tab(s) orally once a day  nicotine 7 mg/24 hr transdermal film, extended release: 1 patch transdermal once a day  Norvasc 10 mg oral tablet: 1 tab(s) orally once a day  Percocet 5/325 oral tablet: 1 tab(s) orally every 6 hours  Protonix 40 mg oral delayed release tablet: 1 tab(s) orally once a day  Reglan 10 mg oral tablet: 1 tab(s) orally 3 times a day  sucralfate 1 g oral tablet: 1 tab(s) orally 4 times a day  Symbicort 160 mcg-4.5 mcg/inh inhalation aerosol: 2 puff(s) inhaled 2 times a day  Synthroid 25 mcg (0.025 mg) oral tablet: 1 tab(s) orally once a day

## 2023-07-11 NOTE — DISCHARGE NOTE PROVIDER - PROVIDER TOKENS
PROVIDER:[TOKEN:[[id number]],FOLLOWUP:[1 week]],PROVIDER:[TOKEN:[[id number]],FOLLOWUP:[1-3 days]],PROVIDER:[TOKEN:[[id number]],FOLLOWUP:[2 weeks]]

## 2023-07-11 NOTE — PROGRESS NOTE ADULT - SUBJECTIVE AND OBJECTIVE BOX
[facility] : Progress Note :: [phone], (office [phone]),   Dr Mosher / Dr Washington / Dr Young / Dr Doll / Dr Varsha TURCIOS / Dr Tello / Dr Garcia / Dr Larry qiu  _____________________________________________________________________________________________    patient only had 1 hr 45 minutes of HD and insisted getting off hd    No Known Drug Allergies  fish (Rash)  liver (Anaphylaxis)    Hospital Medications:   MEDICATIONS  (STANDING):  ALPRAZolam 0.25 milliGRAM(s) Oral at bedtime  amLODIPine   Tablet 10 milliGRAM(s) Oral daily  aspirin enteric coated 81 milliGRAM(s) Oral daily  atorvastatin 40 milliGRAM(s) Oral at bedtime  budesonide 160 MICROgram(s)/formoterol 4.5 MICROgram(s) Inhaler 2 Puff(s) Inhalation two times a day  chlorhexidine 2% Cloths 1 Application(s) Topical <User Schedule>  epoetin beverley (PROCRIT) Injectable 05938 Unit(s) IV Push <User Schedule>  folic acid 1 milliGRAM(s) Oral daily  furosemide    Tablet 80 milliGRAM(s) Oral daily  heparin   Injectable 5000 Unit(s) SubCutaneous every 8 hours  hydrALAZINE 25 milliGRAM(s) Oral three times a day  insulin lispro (ADMELOG) corrective regimen sliding scale   SubCutaneous Before meals and at bedtime  latanoprost 0.005% Ophthalmic Solution 1 Drop(s) Both EYES at bedtime  levothyroxine 25 MICROGram(s) Oral daily  LORazepam     Tablet 0.5 milliGRAM(s) Oral <User Schedule>  melatonin 5 milliGRAM(s) Oral at bedtime  metolazone 10 milliGRAM(s) Oral daily  metoprolol tartrate 25 milliGRAM(s) Oral two times a day  nicotine -   7 mG/24Hr(s) Patch 1 Patch Transdermal daily  pantoprazole    Tablet 40 milliGRAM(s) Oral before breakfast  sodium zirconium cyclosilicate 10 Gram(s) Oral <User Schedule>  sucralfate 1 Gram(s) Oral four times a day        VITALS:  T(F): 98 (07-11-23 @ 12:19), Max: 98.1 (07-11-23 @ 10:38)  HR: 84 (07-11-23 @ 12:19)  BP: 173/88 (07-11-23 @ 12:19)  RR: 18 (07-11-23 @ 12:19)  SpO2: 94% (07-11-23 @ 12:19)  Wt(kg): --    07-10 @ 07:01  -  07-11 @ 07:00  --------------------------------------------------------  IN: 500 mL / OUT: 1375 mL / NET: -875 mL        PHYSICAL EXAM:  Constitutional: NAD  HEENT: anicteric sclera, oropharynx clear,  Neck: No JVD  Respiratory: CTAB, no wheezes, rales or rhonchi  Cardiovascular: S1, S2, RRR  Gastrointestinal: BS+, soft, NT/ND  Extremities:  No peripheral edema  Neurological: A/O x 3, no focal deficits    Vascular Access: AVF with thrill and bruit     LABS:  07-10    132<L>  |  102  |  72<H>  ----------------------------<  89  6.2<HH>   |  21<L>  |  17.20<H>    Ca    7.8<L>      10 Jul 2023 15:25  Phos  5.6     07-10  Mg     3.0     07-10    TPro  6.0  /  Alb  2.4<L>  /  TBili  0.4  /  DBili      /  AST  26  /  ALT  30  /  AlkPhos  71  07-10    Creatinine Trend: 17.20 <--, 15.90 <--                        7.6    2.72  )-----------( 96       ( 10 Jul 2023 15:25 )             24.1     Urine Studies:  Urinalysis Basic - ( 10 Jul 2023 15:25 )    Color:  / Appearance:  / SG:  / pH:   Gluc: 89 mg/dL / Ketone:   / Bili:  / Urobili:    Blood:  / Protein:  / Nitrite:    Leuk Esterase:  / RBC:  / WBC    Sq Epi:  / Non Sq Epi:  / Bacteria:         RADIOLOGY & ADDITIONAL STUDIES:

## 2023-07-11 NOTE — DISCHARGE NOTE PROVIDER - HOSPITAL COURSE
61 [facility] with PMH of ESRD on HD (TTS), BKA- L w/prosthetic leg, DM, Left eye blindness, CHF, CAD, HTN, HLD, osteoporosis, bronchitis, current smoker, and anemia who presents with  missed dialysis. Admitted for dialysis treatment, nephrology consulted and HD was resumed. Also with persistent anemia, likely anemia of chronic disease, GI consulted and recommended outpt follow up   clinically improved, optimized for discharge with outpt follow up   Please note that this a brief summary of hospital course please refer to daily progress notes and consult notes for full course and events

## 2023-07-11 NOTE — PROGRESS NOTE ADULT - ASSESSMENT
Patient is a 62y Male with ESRD on HD TTS at [facility], whom presented to the hospital as missed dialysis since discharge from this hospital last week.    In ED noted to have hyperkalemia and is lethargic today. renal consulted fro ESRD.      # ESRD- missed HD, resume HD today with 1K+ bath at end of HD as with hyperkalemia  only agreed to 1 hr 45 minutes  # CKDMBD. phos at goal off binders  # anemia of CKD. transfused last week. epogen on HD   D/C planning. resume HD schedule at [facility] TTS
61 y/o M from [facility],bulates with RW, with PMH of ESRD on HD (TTS), BKA- L w/prosthetic leg, DM, Left eye blindness, CHF, CAD, HTN, HLD, osteoporosis, bronchitis, current smoker, and anemia who presents with  missed dialysis. Admitted for dialysis treatment, nephrology consulted plan for HD this AM. Also with persistent anemia, will consult GI 
61 y/o M from [facility],bulates with RW, with PMH of ESRD on HD (TTS), BKA- L w/prosthetic leg, DM, Left eye blindness, CHF, CAD, HTN, HLD, osteoporosis, bronchitis, current smoker, and anemia who presents with  missed dialysis. Admitted for dialysis treatment, nephrology consulted. Also with persistent anemia GI followed and reccs outpatient follow up.  Patient S/P HD 7/10, will need further HD today 7/11.

## 2023-07-11 NOTE — PROGRESS NOTE ADULT - PROBLEM SELECTOR PLAN 1
Situation: RN received notification that the patient was hospitalized over the weekend on 4/05/2020 with AMS.      Key Assessments: The patient is currently residing at [facility] and had been yelling out and was though to have a UTI. Dr. Garces ordered Omicef though patient on admission was found to have a blood sugar of 38.      Actions Taken: RNCC had been working with the facility because unable to obtain a urine sample and her sister was concerned she may have a UTI. The facility was unable to obtain a UTI on the patient. RNCC has been working with the patient sister Amy related to the patient's health declining.      Plan:     1. Patient is currently hospitalized and RNCC will continue to follow up.      See hyperlinks within encounter for full documentation  
history of ESRD on dialysis TTS, missed last 3 sessions, last HD 7/4  K 5.6 on admission - s/p Lokelma  BNP > 175k  no respiratory distress   plan for hemodialysis this AM   Nephrology Dr. Mosher
history of ESRD on dialysis TTS, missed last 3 sessions, last HD 7/4  K 5.6 on admission - s/p Lokelma  BNP > 175k  no respiratory distress   S/P 1.5H yesterday 7/10  plan for additional hemodialysis this AM   Nephrology Dr. Mosher

## 2023-07-11 NOTE — PROGRESS NOTE ADULT - SUBJECTIVE AND OBJECTIVE BOX
Patient is a 62y old  Male who presents with a chief complaint of Missed dialysis (2023 08:14)    PATIENT IS SEEN AND EXAMINED IN MEDICAL FLOOR.  SULTANA [    ]    JEREMY [   ]      GT [   ]    ALLERGIES:  No Known Drug Allergies  fish (Rash)  liver (Anaphylaxis)      Daily     Daily Weight in k.8 (10 Jul 2023 16:45)    VITALS:    Vital Signs Last 24 Hrs  T(C): 36.6 (10 Jul 2023 21:51), Max: 36.7 (10 Jul 2023 16:45)  T(F): 97.9 (10 Jul 2023 21:51), Max: 98.1 (10 Jul 2023 16:45)  HR: 81 (10 Jul 2023 21:51) (67 - 92)  BP: 154/84 (10 Jul 2023 21:51) (127/67 - 160/71)  BP(mean): --  RR: 17 (10 Jul 2023 21:51) (17 - 18)  SpO2: 93% (10 Jul 2023 21:51) (93% - 97%)    Parameters below as of 10 Jul 2023 21:51  Patient On (Oxygen Delivery Method): room air        LABS:    CBC Full  -  ( 10 Jul 2023 15:25 )  WBC Count : 2.72 K/uL  RBC Count : 2.77 M/uL  Hemoglobin : 7.6 g/dL  Hematocrit : 24.1 %  Platelet Count - Automated : 96 K/uL  Mean Cell Volume : 87.0 fl  Mean Cell Hemoglobin : 27.4 pg  Mean Cell Hemoglobin Concentration : 31.5 gm/dL  Auto Neutrophil # : 2.20 K/uL  Auto Lymphocyte # : 0.27 K/uL  Auto Monocyte # : 0.14 K/uL  Auto Eosinophil # : 0.09 K/uL  Auto Basophil # : 0.01 K/uL  Auto Neutrophil % : 80.9 %  Auto Lymphocyte % : 9.9 %  Auto Monocyte % : 5.1 %  Auto Eosinophil % : 3.3 %  Auto Basophil % : 0.4 %      0710    132<L>  |  102  |  72<H>  ----------------------------<  89  6.2<HH>   |  21<L>  |  17.20<H>    Ca    7.8<L>      10 Jul 2023 15:25  Phos  5.6     07-10  Mg     3.0     07-10    TPro  6.0  /  Alb  2.4<L>  /  TBili  0.4  /  DBili  x   /  AST  26  /  ALT  30  /  AlkPhos  71  07-10    CAPILLARY BLOOD GLUCOSE      POCT Blood Glucose.: 122 mg/dL (2023 07:50)  POCT Blood Glucose.: 237 mg/dL (10 Jul 2023 23:42)  POCT Blood Glucose.: 172 mg/dL (10 Jul 2023 11:19)        LIVER FUNCTIONS - ( 10 Jul 2023 15:25 )  Alb: 2.4 g/dL / Pro: 6.0 g/dL / ALK PHOS: 71 U/L / ALT: 30 U/L DA / AST: 26 U/L / GGT: x           Creatinine Trend: 17.20<--, 15.90<--, 10.60<--, 10.80<--  I&O's Summary    10 Jul 2023 07:01  -  2023 07:00  --------------------------------------------------------  IN: 500 mL / OUT: 1375 mL / NET: -875 mL            .Blood Blood-Peripheral  - @ 14:07   No Growth Final  --  --          MEDICATIONS:    MEDICATIONS  (STANDING):  ALPRAZolam 0.25 milliGRAM(s) Oral at bedtime  amLODIPine   Tablet 10 milliGRAM(s) Oral daily  aspirin enteric coated 81 milliGRAM(s) Oral daily  atorvastatin 40 milliGRAM(s) Oral at bedtime  budesonide 160 MICROgram(s)/formoterol 4.5 MICROgram(s) Inhaler 2 Puff(s) Inhalation two times a day  chlorhexidine 2% Cloths 1 Application(s) Topical <User Schedule>  epoetin beverley (PROCRIT) Injectable 01991 Unit(s) IV Push <User Schedule>  folic acid 1 milliGRAM(s) Oral daily  furosemide    Tablet 80 milliGRAM(s) Oral daily  heparin   Injectable 5000 Unit(s) SubCutaneous every 8 hours  hydrALAZINE 25 milliGRAM(s) Oral three times a day  insulin lispro (ADMELOG) corrective regimen sliding scale   SubCutaneous Before meals and at bedtime  latanoprost 0.005% Ophthalmic Solution 1 Drop(s) Both EYES at bedtime  levothyroxine 25 MICROGram(s) Oral daily  LORazepam     Tablet 0.5 milliGRAM(s) Oral <User Schedule>  melatonin 5 milliGRAM(s) Oral at bedtime  metolazone 10 milliGRAM(s) Oral daily  metoprolol tartrate 25 milliGRAM(s) Oral two times a day  nicotine -   7 mG/24Hr(s) Patch 1 Patch Transdermal daily  pantoprazole    Tablet 40 milliGRAM(s) Oral before breakfast  sodium zirconium cyclosilicate 10 Gram(s) Oral <User Schedule>  sucralfate 1 Gram(s) Oral four times a day      MEDICATIONS  (PRN):  acetaminophen     Tablet .. 650 milliGRAM(s) Oral every 6 hours PRN Temp greater or equal to 38C (100.4F), Mild Pain (1 - 3)  albuterol    90 MICROgram(s) HFA Inhaler 2 Puff(s) Inhalation every 6 hours PRN for shortness of breath and/or wheezing  aluminum hydroxide/magnesium hydroxide/simethicone Suspension 30 milliLiter(s) Oral every 4 hours PRN Dyspepsia  melatonin 3 milliGRAM(s) Oral at bedtime PRN Insomnia  ondansetron Injectable 4 milliGRAM(s) IV Push every 8 hours PRN Nausea and/or Vomiting  oxycodone    5 mG/acetaminophen 325 mG 1 Tablet(s) Oral every 6 hours PRN Severe Pain (7 - 10)      REVIEW OF SYSTEMS:                           ALL ROS DONE [ X   ]    CONSTITUTIONAL:  LETHARGIC [   ], FEVER [   ], UNRESPONSIVE [   ]  CVS:  CP  [   ], SOB, [   ], PALPITATIONS [   ], DIZZYNESS [   ]  RS: COUGH [   ], SPUTUM [   ]  GI: ABDOMINAL PAIN [   ], NAUSEA [   ], VOMITINGS [   ], DIARRHEA [   ], CONSTIPATION [   ]  :  DYSURIA [   ], NOCTURIA [   ], INCREASED FREQUENCY [   ], DRIBLING [   ],  SKELETAL: PAINFUL JOINTS [   ], SWOLLEN JOINTS [   ], NECK ACHE [   ], LOW BACK ACHE [   ],  SKIN : ULCERS [   ], RASH [   ], ITCHING [   ]  CNS: HEAD ACHE [   ], DOUBLE VISION [   ], BLURRED VISION [   ], AMS / CONFUSION [   ], SEIZURES [   ], WEAKNESS [   ],TINGLING / NUMBNESS [   ]      PHYSICAL EXAMINATION:  GENERAL APPEARANCE: NO DISTRESS  HEENT:  NO PALLOR, NO  JVD,  NO   NODES, NECK SUPPLE  CVS: S1 +, S2 +,   RS: AEEB,  OCCASIONAL  RALES +,   CRACKLES+ AT LUNG BASES  ABD: SOFT, NT, NO, BS +  EXT: LEFT BKA  SKIN: WARM,   SKELETAL:  ROM ACCEPTABLE  CNS:  AAO X  3      RADIOLOGY :      ASSESSMENT :     End-stage renal disease    No pertinent past medical history    Hypertension    Adrenal insufficiency    CKD (chronic kidney disease)    Anemia    Glaucoma    Coronary artery disease    HLD (hyperlipidemia)    Peripheral vascular disease    Spinal stenosis of lumbosacral region    Hyperparathyroidism    Diabetes mellitus    Diabetic neuropathy    Contracture of hand    Osteoarthritis    Osteoporosis    Vision loss of left eye    ESRD on hemodialysis    Cataract    BPH (benign prostatic hyperplasia)    UTI (urinary tract infection)    Bladder mass    H/O hematuria    Osteoporosis    Vision loss of right eye    Depression    Chronic GERD    Osteomyelitis of vertebra    CHF (congestive heart failure)    No significant past surgical history    Below knee amputation status, left    History of right cataract extraction    History of left cataract extraction    S/P arteriovenous (AV) fistula creation    H/O hematuria    H/O transurethral destruction of bladder lesion    History of excision of mass        PLAN:  HPI:  This is a 63 y/o M from [facility], walks with RW, with PMH of ESRD on HD (TTS), BKA- L w/prosthetic leg, DM, Left eye blindness, CHF, CAD, HTN, HLD, osteoporosis, bronchitis, current smoker, and anemia who presents with complaint of missed dialysis. Last HD . Pt states he goes to his dialysis sessions however they do not allow him to use the bathroom during dialysis so he does not get dialysis while he is there. Pt complains of right leg swelling and states he does not make any urine. Pt denies any fever, chills, N/V/D, constipation, dysuria, CP, SOB, numbness or tingling.  (2023 15:48)    # PATIENT W/ HISTORY OF ROUTINE NONCOMPLIANCE W/ LIFE-SUSTAINING TREATMENT [HD], EVALUATIONS AND INTERVENTIONS - COUNSELLED AT LENGTH TO REMAIN COMPLIANT. D/W PATIENT THAT PROGNOSIS POOR. HE VERBALIZED UNDERSTANDING. REGARDING GOC - PATIENT WISHES FOR FULL CODE    # ACUTE ON CHRONIC HYPOXIC RESPIRATORY FAILURE S/T PULMONARY EDEMA - S/T INCOMPLETE HD SESSIONS   # HX OF COPD + S/P RECENT MULTIFOCAL PNEUMONIA  # UNCONTROLLED HTN  # ESRD ON HD TTS - W/ HX OF NONCOMPLIANCE  # HYPERKALEMIA    - HYDRALAZINE, METOPROLOL AND NORVASC  - LOKELMA  - SUPPLEMENTAL O2  - PLANNED FOR HD  - NEPHROLOGY CONSULT  - ID CONSULT      # HX OF RECURRENT INTRACTABLE NAUSEA/VOMITING, GASTROPARESIS  # HISTORY OF ESOPHAGITIS AND DUODENITIS [2021], HX OF GASTROPARESIS W/ EVIDENCE OF THICKENED ESOPHAGUS ON PREVIOUS CT SCAN   # PANCREAS W/ DOUBLE DUCT SIGN    - DENIES RECENT HEMATEMESIS   - MONITORING HGB, PLACED ON PPI BID , CARAFATE TID  - CARAFATE, PRN ANTIEMETICS  - MONITORING FOR SYMPTOMS  - TOLERATING DIET ; PATIENT REPEATEDLY COUNSELLED TO CHEW FOOD CAUTIOUSLY AND CONSUME SMALL BITES W/ REGULAR CLEARING WITH WATER BETWEEN BITES.    - S/P RECENT PRBC TRANSFUSION     - GI CONSULT - RECOMMENDED OUTPATIENT F/U    # GASTROPARESIS  # UNDERLYING DM  - SSI + FS    # PATIENT UNDERGOING OUTPATIENT WORKUP FOR CENTRAL VENOUS STENOSIS THROUGH NEPHROLOGY TEAM    # ANEMIA OF CKD  # HX OF PANCYTOPENIA   - TREND HGB, TRANSFUSION THRESHOLD HGB < 7  - TYPE AND SCREEN  - ON EPO  - DENIES RECENT HEMATEMESIS, MELENA, HEMATOCHEZIA    - S/P RECENT PRBC TRANSFUSION    # SEVERE PROTEIN CALORIE MALNUTRITION, FAILURE TO THRIVE - NUTRITIONAL SUPPLEMENT    # HLD  # PARTIALLY BLIND  # S/P LEFT BKA  # HX OF PVD  # LS SPINAL STENOSIS  # GI AND DVT PPX

## 2023-07-11 NOTE — PROGRESS NOTE ADULT - PROBLEM SELECTOR PLAN 2
controlled  -140's   continue home dose Hydralazine, metoprolol and Norvasc   monitor BP
controlled  -140's   continue home dose Hydralazine, metoprolol and Norvasc   monitor BP

## 2023-07-11 NOTE — PROGRESS NOTE ADULT - PROBLEM SELECTOR PLAN 5
Pt has a history of anemia 2/2 CKD  - C/w epogen with dialysis  - C/w Iron tabs  - F.U GI consult
Pt has a history of anemia 2/2 CKD  - C/w epogen with dialysis  - C/w Iron tabs  - S/P GI consult reccs outpatient follow up

## 2023-07-13 NOTE — ED ADULT NURSE NOTE - NSFALLRSKPASTHIST_ED_ALL_ED
Attempted to call Willow and notify her that his labs are not all completed yet.  Left a detailed message that once they all have been resulted we will call with results.  Notified Willow to return call with any further questions.    no

## 2023-07-24 NOTE — HISTORY OF PRESENT ILLNESS
[] : right brachiocephalic fistula [FreeTextEntry1] : Patient is a 62-year-old male with ESRD currently on hemodialysis via right upper extremity AV fistula who presents to the office today for routine follow-up.  Patient reports recent swelling of the right upper extremity after cannulation during dialysis session. Denies pain. Denies fever or chills.Denies any current issues with excess bleeding at this time .\par

## 2023-07-24 NOTE — REASON FOR VISIT
[Follow-Up Visit] : a follow-up visit for [Family Member] : family member [FreeTextEntry2] : Right brachiocephalic AV fistula

## 2023-07-24 NOTE — DISCUSSION/SUMMARY
[FreeTextEntry1] : 62-year-old male with ESRD currently on hemodialysis via right brachiocephalic AV fistula\par \par In the office today, patient underwent duplex of the right upper extremity which demonstrates a well-functioning AV fistula with no evidence of stenosis.\par \par Recommend elevation of right upper extremity for edema. We will continue to monitor.\par Patient to follow-up in 3 to 4 months with repeat duplex.\par

## 2023-07-24 NOTE — PATIENT PROFILE ADULT - NSFALLSECTIONLABEL_GEN_A_CORE
Recommendations (Free Text): Instructions: Monthly self-skin checks to monitor for any changes in moles are recommended.\\nExpectations: Benign Nevi are pigmented nests of cells within the skin. No treatment is necessary.\\nContact Office if: Any moles change in size, shape or color; itch, burn or bleed.\\nABCDE of melanoma discussed for new patients. Recommendation Preamble: The following recommendations were made during the visit: Detail Level: Zone Render Risk Assessment In Note?: no Recommendation Preamble: The following recommendations were made during the visit: VBEAM laser Recommendation Preamble: The following recommendations were made during the visit: IPL laser .

## 2023-07-24 NOTE — PHYSICAL EXAM
[Thrill] : thrill [Aneurysm] : aneurysm [Hand well perfused] : hand well perfused [2+] : right 2+ [Normal] : coordination grossly intact, no focal deficits [Pulsatile Thrill] : no pulsatile thrill [Bleeding] : no bleeding [Ulcer] : no ulcer [de-identified] : right upper extremity edema [de-identified] : intact

## 2023-07-25 NOTE — DISCHARGE NOTE NURSING/CASE MANAGEMENT/SOCIAL WORK - MODE OF TRANSPORTATION
[facility]  EMERGENCY DEPARTMENT ENCOUNTER      Pt Name: Kolby Mcnally  MRN: [id number]  [address] [place] 1997  Date of evaluation: 7/25/2023  Provider: Taya Garcia MD    CHIEF COMPLAINT       Chief Complaint   Patient presents with    Headache    Emesis         HISTORY OF PRESENT ILLNESS   (Location/Symptom, Timing/Onset, Context/Setting, Quality, Duration, Modifying Factors, Severity)  Note limiting factors. The patient is a 28-year-old white female who presents to the emergency room with a cute onset of migraine type headache that started about 7:30 PM last night it is throbbing in nature she saw some blue lights she has no new focal neurologic deficit. It is associated with nausea it is worse with movement. She had frequent migraines as a child but has not had any for some time according to her she rates the headache a 10 out of 10. She denies fever or chills or any COVID exposure. Review of External Medical Records:     Nursing Notes were reviewed. REVIEW OF SYSTEMS    (2-9 systems for level 4, 10 or more for level 5)     Review of Systems   All other systems reviewed and are negative. Except as noted above the remainder of the review of systems was reviewed and negative.        PAST MEDICAL HISTORY     Past Medical History:   Diagnosis Date    Adverse effect of anesthesia     REQUIRES A LOT OF ANESTHESIA PER MOTHER    Anemia     Anxiety     Attention deficit disorder (ADD)     Closed fracture of metatarsal neck 11/11/2015    [facility], 9/15/15 with Dr. Gila Lou alignment and healing     Conversion disorder     Depression     Hx of seasonal allergies     Hypothyroidism 1/8/2014    Kyphosis     Kyphosis     Obesity     Scoliosis     Scoliosis     Snoring     Vision decreased     Vitamin D deficiency 1/8/2014    Took 12 weeks of weekly 26752 international units of vitamin d and now on 2000 international units  daily            SURGICAL HISTORY       Past Surgical
Ambulette

## 2023-07-26 NOTE — H&P ADULT - NSHPREVIEWOFSYSTEMS_GEN_ALL_CORE
CONSTITUTIONAL: No fever, weight loss, or fatigue  RESPIRATORY: No cough, wheezing, chills or hemoptysis; + shortness of breath  CARDIOVASCULAR: No chest pain, palpitations, dizziness. + leg swelling  GASTROINTESTINAL: No abdominal pain. No nausea, vomiting, or hematemesis; No diarrhea or constipation. No melena or hematochezia.  GENITOURINARY: No dysuria or hematuria, urinary frequency  NEUROLOGICAL: No headaches, memory loss, loss of strength, numbness, or tremors  ENDOCRINE: No polyuria, polydipsia, or heat/cold intolerance  MUSCULOSKELETAL: No muscle aches, joint pains  HEME: no easy bruisability, no tender or enlarged lymph nodes  SKIN: No itching, burning, rashes, or lesions .

## 2023-07-26 NOTE — H&P ADULT - ASSESSMENT
63 y/o M from [facility], walks with RW, with PMH of ESRD on HD (TTS), BKA- L w/prosthetic leg, DM, Left eye blindness, CHF, CAD, HTN, HLD, osteoporosis, bronchitis, and anemia who presents with complaint of missed dialysis. Admitted to tele for dialysis.

## 2023-07-26 NOTE — ED PROVIDER NOTE - MUSCULOSKELETAL, MLM
LLE BKA stump appears normal, nontender;  Spine appears normal, range of motion is not limited, no muscle or joint tenderness

## 2023-07-26 NOTE — ED ADULT NURSE NOTE - SKIN TURGOR
----- Message from Alon Cordero sent at 4/13/2017  8:31 AM CDT -----  Contact: Pt   Pt wants to know is he still scheduled for surgery/ He can be reached at [phone] ([email or web address])     
Returned pt phone call. Pt was unavailable. Left message for pt to call at earliest convenience.  
resilient/elastic

## 2023-07-26 NOTE — CHART NOTE - NSCHARTNOTEFT_GEN_A_CORE
patient with ESRD. presented with SOB.  missed outpt HD sessions  for unclear reasons   he is fluid overloaded and anemic  will have him dialysed today and epogen given  HD tommorrow as well if agrees  full consult to follow

## 2023-07-26 NOTE — H&P ADULT - PROBLEM SELECTOR PLAN 4
Pt has a history of DM, takes 4U Lantus at home  - C/w ISS  FS ACHS Pt has a history of HLD, takes Crestor 40mg at home  - C/w statin

## 2023-07-26 NOTE — H&P ADULT - NSHPPHYSICALEXAM_GEN_ALL_CORE
GENERAL: NAD, thin elderly male, lying in bed under blanket, does not want to participate in full exam; extremely Volume overload  HEAD:  Atraumatic, Normocephalic  EYES: EOMI, PERRLA, conjunctiva and sclera clear. left eye blind  NECK: Supple  CHEST/LUNG: b/l lower lobe rails;  HEART: Regular rate and rhythm; No murmurs, rubs, or gallops  ABDOMEN: Soft, Nontender, Nondistended; Bowel sounds present  EXTREMITIES:  2+ Peripheral Pulses. 2+ edema RLE  PSYCH: AAOx3  NEUROLOGY: non-focal  SKIN: No rashes or lesions

## 2023-07-26 NOTE — CHART NOTE - NSCHARTNOTEFT_GEN_A_CORE
Spoke with patients brother, Georgi King [phone].  Explained patients current condition, diagnosis, and treatment.  All questions that were able to be answered were answered.  Provided education on importance of HD sessions.  State he understand what we discussed.

## 2023-07-26 NOTE — ED ADULT NURSE NOTE - OBJECTIVE STATEMENT
Pt aox2, legally blind, sent from assisted living for SOB, and missed dialysis yesterday. Pt denies symptoms on arrival to ED. Pt refused cardiac monitoring and supplemental O2. No distress noted. MD Morris notified.

## 2023-07-26 NOTE — H&P ADULT - PROBLEM SELECTOR PLAN 1
- Pt has a history of ESRD on dialysis TTS, missed last session last HD 7/22  - Patient volume overload on exam, 2+ edema in RLE, lungs mildly crackles,   - K: 5.3  - C/w hemodialysis per nephro, likley tommorow  - Continued discussed and education of patient regarding importance of following up with HD session  - Admit to tele  - monitor electrolytes  - CT Chest: Pulmonary edema  Nephrology Dr. Mosher Consulted - Pt has a history of ESRD on dialysis TTS, missed last session last HD 7/22  - Patient volume overload on exam, 2+ edema in RLE, lungs mildly crackles,   - K: 5.3  - C/w hemodialysis per nephro, likely tomorrow  - Continued discussed and education of patient regarding importance of following up with HD session  - Admit to tele  - monitor electrolytes  - CT Chest: Pulmonary edema  - Nephrology Consulted Dr. Mosher

## 2023-07-26 NOTE — H&P ADULT - PROBLEM SELECTOR PLAN 3
yes Pt has a history of HLD, takes Crestor 40mg at home  - C/w statin - Pt has a history of DM, takes 4U Lantus at home  - Start ISS  - Start FS ACHS

## 2023-07-26 NOTE — ED ADULT TRIAGE NOTE - ACCOMPANIED BY
HISTORY AND PHYSICAL REPORT          Chief Complaint   Patient presents with   • Physical     CPE   • Convey Results   • Letter For School/work     would like a letter stating she had her physical today          HISTORY OF PRESENT ILLNESS:  This is a 49 year old  female with a birthdate of 1970 who presents for a complete physical exam and follow up of:     History of hypercholesterolemia: Patient has been diagnosed with high cholesterol in the past. She admits that she has not exercised as much in the past year as she had previously. She states her and her  do watch what they eat and maintain a relatively health diet.     Vitamin d insufficiency: States she has been faithful in taking 5000 units twice a day for the past 4 months. She was prescribed 5000 units daily at the last visit.     Difficulty staying asleep: she states she is able to fall asleep most nights without any problems but has difficulties staying asleep. This has been an issue for the past six months since she has stopped exercising.     History of gestational diabetes: Elevated fasting glucose of 109 in most recent labs. Has not had any other elevated levels.           Past Medical History:   Diagnosis Date   • ADD (attention deficit disorder)    • Anxiety disorder    • Depression    • Hyperlipidemia    • Lactose intolerance    • Menopause     2012   • Migraines    • Vitamin D deficiency         ALLERGIES:   Allergen Reactions   • Amoxicillin HIVES   • Ciprofloxacin RASH     RASH ALL OVER ABDOMEN        Current Outpatient Medications   Medication Sig Dispense Refill   • Elderberry 575 MG/5ML Syrup      • escitalopram (LEXAPRO) 10 MG tablet Take 1 tablet by mouth daily. 30 tablet 5   • estradiol (ESTRACE) 0.1 MG/GM vaginal cream Place 1 g vaginally 2 days a week. 1 Tube 5   • Cyanocobalamin (VITAMIN B12 PO) Take 1 tablet by mouth daily.     • Probiotic Product (PROBIOTIC  DAILY PO) Take 1 capsule by mouth daily.      • Cholecalciferol 2000 UNITS Tab Take 2,000 Units by mouth daily.  30 tablet 11   • Omega-3 Fatty Acids (FISH OIL) 1200 MG capsule Take 1,200 mg by mouth daily.     • Daily Multiple Vitamins TABS Take 1 tablet by mouth daily.     • estrogens, conjugated (PREMARIN) vaginal cream Place 0.5 g vaginally 2 days a week. 30 g 4     No current facility-administered medications for this visit.         Past Surgical History:   Procedure Laterality Date   • Anes post colporrhaphy repr rectocele w/  2019   • Appendectomy  1979   • Cystourethroscopy  2019   • Dilation and curettage  2015    S History   • Pap,thin prep w hpv(inc 54013)  2016    Normal         .  Social History     Tobacco Use   • Smoking status: Former Smoker     Packs/day: 0.50     Years: 15.00     Pack years: 7.50     Types: Cigarettes     Last attempt to quit: 2012     Years since quittin.1   • Smokeless tobacco: Former User   • Tobacco comment: per patient stated smoked off and on   Substance Use Topics   • Alcohol use: Yes     Comment: occassionally/social    • Drug use: No        Exercise consists of about once a week maybe.  Diet consists of 3 meals a day    Family History   Problem Relation Age of Onset   • Cancer Mother         breast    • Diabetes Father         adult onset   • Stroke Brother    • Cancer Paternal Grandmother         colon   • Stroke Paternal Grandmother           REVIEW OF SYSTEMS:  GENERAL:  No fever, weight loss, weight gain, fatigue or change in  appetite.  HEENT:  No visual changes or eye pain.  The patient does not wear corrective  lenses.  No hearing loss, ear pain, sore  throat, hoarseness, or nose bleed.  NECK:  No neck pain or lumps.  CARDIOPULMONARY:  No chest pain, shortness of breath, cough, palpitations  or peripheral edema.  GASTROINTESTINAL:  No nausea, vomiting, diarrhea, constipation, bloating,  indigestion or stomach pain.  Colonoscopy NA  GENITAL:  No dysmenorrhea, irregular bleeding, hot flashes, or vaginal  discharge.  Date of last menses 2012.  Last Pap 1/28/2020.  BREASTS:  No breast lumps, breast pain, or nipple discharge.  Date of last  mammogram 12-19-18. Five year risk breast assessment score is 1.6%  URINARY:  No frequency, urgency, dysuria, incontinence or nocturia.  SKIN:  No rash, pruritus, sores, change in moles or easy bruising.  NEUROLOGIC:  No headaches, numbness, tingling, fainting or focal  weakness.  MUSCULOSKELETAL:  No musculoskeletal pain or weakness.  PSYCHOSOCIAL:  No depression, anxiety, insomnia or anhedonia.    OBJECTIVE:    Visit Vitals  /68   Pulse 72   Resp 16   Ht 5' 4\" (1.626 m)   Wt 66.2 kg   LMP 01/24/2013   SpO2 96%   BMI 25.06 kg/m²     Wt Readings from Last 5 Encounters:   02/11/20 66.2 kg   01/28/20 65.8 kg   07/15/19 64 kg   06/21/19 64.5 kg   06/05/19 65.3 kg     BP Readings from Last 5 Encounters:   02/11/20 102/68   01/28/20 90/60   07/15/19 130/72   06/21/19 116/82   06/05/19 114/78       CONSTITUTIONAL:  Appears healthy, and is well-groomed.  HEENT:  Normocephalic.  Atraumatic.  Bilateral external ears normal. Normal conjunctivae and lids.  Oropharynx moist.  No oral exudate. Pupils equal, reactive to light.  Funduscopic examination deferred.  Mouth, ears and nose without lesions.  Normal tympanic membranes and canals.  Normal hearing.  Normal nasal mucosa and septum.  Normal lips, teeth and  gums.  No dentures.  Normal oral mucosa, palates, tonsils and  pharynx.  NECK:  Soft, supple.  Normal range of motion. No masses, without tracheal deviation.  Normal thyroid without mass or enlargement.  Mallampati score 0  RESPIRATORY:  Normal breath sounds.  No respiratory distress.  No rales, rhonchi or wheezing.  No retractions or accessory muscle use.  Normal percussion.  Normal  palpation.  CARDIOVASCULAR:  Normal rate and rhythm.  Normal S1, S2 without murmurs or thrills.  No edema.  No  pulsatile abdominal aorta.  Pedal and femoral pulses are palpable/normal. No carotid bruits.  BREASTS:  No asymmetry or nipple discharge.  No palpable masses or  tenderness.  LYMPH NODE SURVEY:  No cervical or supraclavicular lymphadenopathy  detected.  ABDOMEN:  Abdomen soft, nontender without palpable mass.  No hepatosplenomegaly.  No ventral, umbilical or inguinal hernias. Bowel sounds normal..   RECTAL:  Normal tone.  No mass or hemorrhoids.  Negative stool for occult  blood.  GENITOURINARY:  Deferred. Pap and exam performed 1/28/2020.  MUSCULOSKELETAL:  Normal gait.  Normal digits/nails without clubbing or  cyanosis. Intact distal pulses.  No edema. Neck and back without tenderness, asymmetry or effusion.  All extremities have normal range of motion and stability, muscle strength and tone.   SKIN:  No suspicious lesion, ulcers or rashes.  No subcutaneous masses.  NEUROLOGIC:  Cranial nerves 2-12 intact.  Normal sensation, motor strength  and deep tendon reflexes to all four extremities.  PSYCHIATRIC:  Normal judgment and insight.  Oriented to place, person and  time.  Normal recent and remote memory.  Normal mood and affect.    IMPRESSION AND PLAN:    1.  Hypercholesterolemia: According to the American Heart Association she has less that 1% chance of having an event in the next 10 years. Encouraged and increase in exercise daily. At least 30 minutes a day. Encouraged decreasing the fat and fried foods and increase healthy fats. Follow up in 6 months with lipid panel and CMP.   2. Vitamin D insufficiency: Advised to only take 5000 units daily. Add Vitamin D 50,000 units once a week.   3: Sleep maintance insomnia: Recommend trying melatonin extended release and gave patient sleep education.  4. History of gestational diabetes: Continue to monitor glucose levels. Increase exercise. HA1C was 5.5 today in office. Will follow up with labs in 6 months.     Health promotion: mammogram scheduled 2/18.          Isidro  CHARLIE Cazares student [employer], scribing for Melissa Black NP.         DICTATING PROVIDER:  María Black NP            EMT/paramedic

## 2023-07-26 NOTE — H&P ADULT - PROBLEM SELECTOR PLAN 5
Pt has a history of anemia 2/2 CKD  - C/w epogen with dialysis  - C/w Iron tabs - Pt has a history of anemia 2/2 CKD  - C/w epogen with dialysis  - C/w Iron tabs

## 2023-07-26 NOTE — ED ADULT NURSE NOTE - NSFALLHARMRISKINTERV_ED_ALL_ED
Assistance OOB with selected safe patient handling equipment if applicable/Assistance with ambulation/Communicate risk of Fall with Harm to all staff, patient, and family/Provide visual cue: red socks, yellow wristband, yellow gown, etc/Reinforce activity limits and safety measures with patient and family/Use of alarms - bed, stretcher, chair and/or video monitoring/Bed in lowest position, wheels locked, appropriate side rails in place/Call bell, personal items and telephone in reach/Instruct patient to call for assistance before getting out of bed/chair/stretcher/Non-slip footwear applied when patient is off stretcher/[place] to call system/Physically safe environment - no spills, clutter or unnecessary equipment/Purposeful Proactive Rounding/Room/bathroom lighting operational, light cord in reach

## 2023-07-26 NOTE — H&P ADULT - HISTORY OF PRESENT ILLNESS
Patient is 62 year old male from [facility], walks with RW, with PMH of ESRD on HD (TTS), BKA- L w/prosthetic leg, DM, Left eye blindness, CHF, CAD, HTN, HLD, osteoporosis, bronchitis, current smoker, and anemia who presents with complaint of missed dialysis. Last HD 7/22. Pt states he often missed HD sessions.  patient states he missed this HD session due to mild pain in left leg, patient remains able to ambulate. Pt complains of right leg swelling and states he does not make any urine. Patient states he was having mild shortness of breath.  Pt denies any fever, chills, N/V/D, constipation, CP, numbness or tingling

## 2023-07-26 NOTE — H&P ADULT - PROBLEM SELECTOR PLAN 2
Pt has a history of HTN, takes Hydralazine 25mg TID, metoprolol succinate 50mg, Norvasc 10mg at home  - C/w hydralazine, metoprolol and Norvasc - Pt has a history of HTN, takes Hydralazine 25mg TID, metoprolol succinate 50mg, Norvasc 10mg at home  - C/w hydralazine, metoprolol and Norvasc

## 2023-07-26 NOTE — ED PROVIDER NOTE - OBJECTIVE STATEMENT
61y/o male, h/o end-stage renal disease on dialysis, Monday Wednesday and Friday, CAD, hypertension, diabetes, sent from [facility] for missed dialysis.   Denies CP/SOB/fever or any other symptoms.

## 2023-07-26 NOTE — H&P ADULT - ATTENDING COMMENTS
Patient is 62 year old male from [facility], walks with RW, with PMH of ESRD on HD (TTS), BKA- L w/prosthetic leg, DM, Left eye blindness, CHF, CAD, HTN, HLD, osteoporosis, bronchitis, current smoker, and anemia who presents with complaint of missed dialysis. Last HD 7/22. Pt states he often missed HD sessions.  patient states he missed this HD session due to mild pain in left leg, patient remains able to ambulate. Pt complains of right leg swelling and states he does not make any urine. Patient states he was having mild shortness of breath.  Pt denies any fever, chills, N/V/D, constipation, CP, numbness or tingling    # PATIENT W/ HISTORY OF ROUTINE NONCOMPLIANCE W/ LIFE-SUSTAINING TREATMENT [HD], EVALUATIONS AND INTERVENTIONS - COUNSELLED AT LENGTH TO REMAIN COMPLIANT. D/W PATIENT THAT PROGNOSIS IS GRIM/POOR. HE VERBALIZED UNDERSTANDING. REGARDING GOC - PATIENT WISHES FOR FULL CODE. PALLIATIVE CARE CONSULT.    # ACUTE ON CHRONIC HYPOXIC RESPIRATORY FAILURE S/T PULMONARY EDEMA - S/T INCOMPLETE HD SESSIONS ; ANASARCA  # HX OF COPD + S/P RECENT MULTIFOCAL PNEUMONIA  # UNCONTROLLED HTN  # ESRD ON HD TTS - W/ HX OF NONCOMPLIANCE      - HYDRALAZINE, METOPROLOL AND NORVASC  - SUPPLEMENTAL O2  - PLANNED FOR HD  - NEPHROLOGY CONSULT    - PLANNED FOR HD    # ELEVATED TROPONINS - ? DEMAND ISCHEMIA    - MONITOR ON TELEMETRY  - TREND TROPONINS  -  F/U ECHOCARDIOGRAM  - CARDIOLOGY CONSULT      # HX OF RECURRENT INTRACTABLE NAUSEA/VOMITING, GASTROPARESIS  # HISTORY OF ESOPHAGITIS AND DUODENITIS [1/2021], HX OF GASTROPARESIS W/ EVIDENCE OF THICKENED ESOPHAGUS ON PREVIOUS CT SCAN   # PANCREAS W/ DOUBLE DUCT SIGN    - DENIES RECENT HEMATEMESIS   - MONITORING HGB, PLACED ON PPI BID , CARAFATE TID  - CARAFATE, PRN ANTIEMETICS  - MONITORING FOR SYMPTOMS  - TOLERATING DIET ; PATIENT REPEATEDLY COUNSELLED TO CHEW FOOD CAUTIOUSLY AND CONSUME SMALL BITES W/ REGULAR CLEARING WITH WATER BETWEEN BITES.    - S/P RECENT PRBC TRANSFUSION     - RECENT GI CONSULT - RECOMMENDED OUTPATIENT F/U    # GASTROPARESIS  # UNDERLYING DM  - SSI + FS    # PATIENT UNDERGOING OUTPATIENT WORKUP FOR CENTRAL VENOUS STENOSIS THROUGH NEPHROLOGY TEAM  - ? s/p STENT PLACEMENT    # ANEMIA OF CKD  # HX OF PANCYTOPENIA   - TREND HGB, TRANSFUSION THRESHOLD HGB < 7  - TYPE AND SCREEN  - ON EPO  - DENIES RECENT HEMATEMESIS, MELENA, HEMATOCHEZIA    - S/P RECENT PRBC TRANSFUSION    # SEVERE PROTEIN CALORIE MALNUTRITION, FAILURE TO THRIVE - NUTRITIONAL SUPPLEMENT    # HLD  # PARTIALLY BLIND  # S/P LEFT BKA  # HX OF PVD  # LS SPINAL STENOSIS  # GI AND DVT PPX

## 2023-07-26 NOTE — ED PROVIDER NOTE - PROGRESS NOTE DETAILS
D/w Dr. COLIN De La Torre and he agrees to admit to his service.  Spoke with nephrologist Dr. Young to inform him of admission for dialysis.

## 2023-07-27 NOTE — PROGRESS NOTE ADULT - PROBLEM SELECTOR PLAN 4
Pt has a history of HTN, takes Hydralazine 25mg TID, metoprolol succinate 50mg, Norvasc 10mg at home  - C/w hydralazine, metoprolol and Norvasc.

## 2023-07-27 NOTE — BH CONSULTATION LIAISON ASSESSMENT NOTE - SUMMARY
63 y/o M with a hx of HTN, DM2 with L eye blindness, CAD, CHF, HLD, L BKA with prosthesis, and ESRD on H/D, who is admitted due to a CHF exacerbation. He is consulted for a r/o of depression. Patient appeared lethargic and confused upon interview, probably secondary to uremia leading to delirium. Nonetheless, given the collateral obtained, an underlying clinical depression appears likely as well. He is not suicidal, homicidal or psychotic, although endorses intermittent death wishes.    -Consider Zoloft 50 mg PO daily, can optimize to 100 mg within 1-2 weeks  -PRN: Haldol 0.5 mg IM/IV q 8 hrs PRN for agitation  -Monitor his QTc  -No need for an inpatient psychiatric admission or 1:1  -Case discussed with the primary team  -Will follow as needed

## 2023-07-27 NOTE — BH CONSULTATION LIAISON ASSESSMENT NOTE - HPI (INCLUDE ILLNESS QUALITY, SEVERITY, DURATION, TIMING, CONTEXT, MODIFYING FACTORS, ASSOCIATED SIGNS AND SYMPTOMS)
61 y/o M with a hx of HTN, DM2 with L eye blindness, CAD, CHF, HLD, L BKA with prosthesis, and ESRD on H/D, who is admitted due to a CHF exacerbation. He is consulted for a r/o of depression. As per team, the patient has been poorly compliant with H/D and treatments at the NH and while inpatient. Team is concerned about depression. Patient was found asleep, but verbally responsive. He said that he didn't feel well, because "I haven't been following the treatments". Acknowledged feeling depressed and having intermittent death wishes and SI, although would not elaborate. Said that he would accept treatment for depression, but did not answer any other questions. The evaluation was limited.

## 2023-07-27 NOTE — PROGRESS NOTE ADULT - PROBLEM SELECTOR PLAN 1
Pt has a history of ESRD on dialysis TTS, missed last session last HD 7/22  - Patient volume overload on exam, 2+ edema in RLE, lungs mildly crackles,   - K: 5.3  - s/p 1h only hemodialysis yesterday  - plan for full session HD today   - Continued discussed and education of patient regarding importance of following up with HD session  - Admit to tele- patient refusing telemetry monitoring  - monitor electrolytes- pt refusing daily phlebotomy  - CT Chest: Pulmonary edema  - Nephrology Consulted Dr. Mosher.

## 2023-07-27 NOTE — ED ADULT NURSE REASSESSMENT NOTE - NS ED NURSE REASSESS COMMENT FT1
7 am pt resting now , no distress . refusing iv , meds , cardiac monitor  , everything  . NP DEBORAH made aware . took pt to bath room had 1 BM . placed pt back to bed . report given to day shift RN

## 2023-07-27 NOTE — PATIENT PROFILE ADULT - FUNCTIONAL ASSESSMENT - BASIC MOBILITY 6.
3-calculated by average/Not able to assess (calculate score using [facility] averaging method)

## 2023-07-27 NOTE — ED ADULT NURSE REASSESSMENT NOTE - NS ED NURSE REASSESS COMMENT FT1
1230 am pt very unco-operative , pulled iv out , not letting cardiac   monitor on , refusing meds , yelling and screaming . ITZ Betts made aware

## 2023-07-27 NOTE — PROGRESS NOTE ADULT - SUBJECTIVE AND OBJECTIVE BOX
NP Note discussed with Primary Attending.      Patient is a 62y old  Male who presents with a chief complaint of Missed dialysis (27 Jul 2023 12:27)      INTERVAL HPI/OVERNIGHT EVENTS: no new complaints    MEDICATIONS  (STANDING):  amLODIPine   Tablet 10 milliGRAM(s) Oral daily  aspirin  chewable 81 milliGRAM(s) Oral daily  atorvastatin 40 milliGRAM(s) Oral at bedtime  budesonide 160 MICROgram(s)/formoterol 4.5 MICROgram(s) Inhaler 2 Puff(s) Inhalation two times a day  epoetin beverley (PROCRIT) Injectable 24468 Unit(s) IV Push <User Schedule>  folic acid 1 milliGRAM(s) Oral daily  furosemide    Tablet 80 milliGRAM(s) Oral daily  heparin   Injectable 5000 Unit(s) SubCutaneous every 12 hours  hydrALAZINE 25 milliGRAM(s) Oral three times a day  latanoprost 0.005% Ophthalmic Solution 1 Drop(s) Both EYES at bedtime  levothyroxine 25 MICROGram(s) Oral daily  LORazepam     Tablet 0.5 milliGRAM(s) Oral <User Schedule>  metoclopramide 10 milliGRAM(s) Oral three times a day  metolazone 10 milliGRAM(s) Oral daily  metoprolol succinate ER 50 milliGRAM(s) Oral daily  oxycodone    5 mG/acetaminophen 325 mG 1 Tablet(s) Oral every 6 hours  pantoprazole    Tablet 40 milliGRAM(s) Oral before breakfast  sertraline 50 milliGRAM(s) Oral daily  sevelamer carbonate 800 milliGRAM(s) Oral three times a day  sucralfate 1 Gram(s) Oral four times a day    MEDICATIONS  (PRN):  albuterol    90 MICROgram(s) HFA Inhaler 2 Puff(s) Inhalation every 6 hours PRN for shortness of breath and/or wheezing  haloperidol    Injectable 0.5 milliGRAM(s) IntraMuscular every 8 hours PRN Agitation  ondansetron   Disintegrating Tablet 4 milliGRAM(s) Oral every 6 hours PRN Nausea and/or Vomiting      __________________________________________________  REVIEW OF SYSTEMS:    CONSTITUTIONAL: No fever,   EYES: no acute visual disturbances  NECK: No pain or stiffness  RESPIRATORY: No cough; No shortness of breath  CARDIOVASCULAR: No chest pain, no palpitations  GASTROINTESTINAL: No pain. No nausea or vomiting; No diarrhea   NEUROLOGICAL: No headache or numbness, no tremors  MUSCULOSKELETAL: BKA left, No joint pain, no muscle pain  GENITOURINARY: ESRD on dialysis, + dysuria, no frequency, no hesitancy  PSYCHIATRY: no depression , no anxiety  ALL OTHER  ROS negative        Vital Signs Last 24 Hrs  T(C): 36.7 (27 Jul 2023 08:45), Max: 36.7 (27 Jul 2023 04:54)  T(F): 98 (27 Jul 2023 08:45), Max: 98.1 (27 Jul 2023 04:54)  HR: 91 (27 Jul 2023 08:45) (79 - 91)  BP: 154/71 (27 Jul 2023 08:45) (129/66 - 167/88)  BP(mean): 91 (27 Jul 2023 08:45) (91 - 91)  RR: 18 (27 Jul 2023 08:45) (17 - 20)  SpO2: 81% (27 Jul 2023 08:45) (81% - 99%)    Parameters below as of 27 Jul 2023 08:45  Patient On (Oxygen Delivery Method): room air        ________________________________________________  PHYSICAL EXAM:  GENERAL: NAD  HEENT: Normocephalic;  conjunctivae and sclerae clear; moist mucous membranes;   NECK : supple  CHEST/LUNG: Clear to auscultation bilaterally with good air entry   HEART: S1 S2  regular; no murmurs, gallops or rubs  ABDOMEN: Soft, Nontender, Nondistended; Bowel sounds present  EXTREMITIES: no cyanosis; no edema; no calf tenderness  SKIN: warm and dry; no rash  NERVOUS SYSTEM:  Awake and alert; Oriented  to place, person and time ; no new deficits    _________________________________________________  LABS:                        7.2    2.57  )-----------( 73       ( 26 Jul 2023 14:10 )             22.6     07-26    132<L>  |  99  |  102<H>  ----------------------------<  103<H>  5.3   |  22  |  15.80<H>    Ca    8.1<L>      26 Jul 2023 14:10  Phos  5.3     07-26  Mg     3.3     07-26    TPro  6.5  /  Alb  2.7<L>  /  TBili  0.7  /  DBili  x   /  AST  25  /  ALT  42  /  AlkPhos  89  07-26      Urinalysis Basic - ( 26 Jul 2023 14:10 )    Color: x / Appearance: x / SG: x / pH: x  Gluc: 103 mg/dL / Ketone: x  / Bili: x / Urobili: x   Blood: x / Protein: x / Nitrite: x   Leuk Esterase: x / RBC: x / WBC x   Sq Epi: x / Non Sq Epi: x / Bacteria: x      CAPILLARY BLOOD GLUCOSE            RADIOLOGY & ADDITIONAL TESTS:    ACC: [id number] EXAM:  CT ABDOMEN AND PELVIS   ORDERED BY: JAD MATOS     ACC: [id number] EXAM:  CT CHEST   ORDERED BY: JAD MATOS     PROCEDURE DATE:  07/26/2023          INTERPRETATION:  CLINICAL INFORMATION: ESRD on hemodialysis. Fluid   overload.    COMPARISON: 2/21/2023. 12/26/2021.    CONTRAST/COMPLICATIONS:  IV Contrast: NONE  Oral Contrast: NONE  Complications: None reported at time of study completion    PROCEDURE:  CT of the Chest, Abdomen and Pelvis was performed.  Sagittal and coronal reformats were performed.    FINDINGS:  CHEST:  LUNGS AND LARGE AIRWAYS: Patent central airways. Mild interlobular septal   thickening and groundglass opacities. Atelectasis in the anterior left   lower lobe and in the lingula.  PLEURA: Small right andtrace left pleural effusions.  VESSELS: Atherosclerotic changes of the aorta and coronary arteries.   Stent, possibly in the right axillary vein.  HEART: Cardiomegaly. No pericardial effusion.  MEDIASTINUM AND PADMINI: No lymphadenopathy.  CHEST WALL AND LOWER NECK: Within normal limits.    ABDOMEN AND PELVIS:  LIVER: Within normal limits.  BILE DUCTS: Mild dilatation of the biliary tree is also unchanged.  GALLBLADDER: Within normal limits.  SPLEEN: Within normal limits.  PANCREAS: A dilated proximal pancreatic duct is again noted and unchanged   since recent MRI.  ADRENALS: Within normal limits.  KIDNEYS/URETERS: Bilateral atrophic kidneys.    BLADDER: Decompressed and could not be evaluated.  REPRODUCTIVE ORGANS: Prostate within normal limits.    BOWEL: No bowel obstruction. Appendix is normal. Retained contrast within   the large bowel which contains moderate amount of stool but is otherwise   is normal in appearance.  PERITONEUM: No ascites.  VESSELS: Atherosclerotic changes.  RETROPERITONEUM/LYMPH NODES: No lymphadenopathy.  ABDOMINAL WALL: Anasarca.  BONES: Degenerative changes.    IMPRESSION:  Mild pulmonary edema and marked anasarca.  A dilated proximal pancreatic duct and mildly dilated biliary tree are   without significant change.      --- End of Report ---            AXEL GOMES MD; Attending Radiologist  This document has been electronically signed. Jul 26 2023  6:26PM  ACC: [id number] EXAM:  CT ABDOMEN AND PELVIS   ORDERED BY: JAD MATOS     ACC: [id number] EXAM:  CT CHEST   ORDERED BY: JAD MATOS     PROCEDURE DATE:  07/26/2023          INTERPRETATION:  CLINICAL INFORMATION: ESRD on hemodialysis. Fluid   overload.    COMPARISON: 2/21/2023. 12/26/2021.    CONTRAST/COMPLICATIONS:  IV Contrast: NONE  Oral Contrast: NONE  Complications: None reported at time of study completion    PROCEDURE:  CT of the Chest, Abdomen and Pelvis was performed.  Sagittal and coronal reformats were performed.    FINDINGS:  CHEST:  LUNGS AND LARGE AIRWAYS: Patent central airways. Mild interlobular septal   thickening and groundglass opacities. Atelectasis in the anterior left   lower lobe and in the lingula.  PLEURA: Small right andtrace left pleural effusions.  VESSELS: Atherosclerotic changes of the aorta and coronary arteries.   Stent, possibly in the right axillary vein.  HEART: Cardiomegaly. No pericardial effusion.  MEDIASTINUM AND PADMINI: No lymphadenopathy.  CHEST WALL AND LOWER NECK: Within normal limits.    ABDOMEN AND PELVIS:  LIVER: Within normal limits.  BILE DUCTS: Mild dilatation of the biliary tree is also unchanged.  GALLBLADDER: Within normal limits.  SPLEEN: Within normal limits.  PANCREAS: A dilated proximal pancreatic duct is again noted and unchanged   since recent MRI.  ADRENALS: Within normal limits.  KIDNEYS/URETERS: Bilateral atrophic kidneys.    BLADDER: Decompressed and could not be evaluated.  REPRODUCTIVE ORGANS: Prostate within normal limits.    BOWEL: No bowel obstruction. Appendix is normal. Retained contrast within   the large bowel which contains moderate amount of stool but is otherwise   is normal in appearance.  PERITONEUM: No ascites.  VESSELS: Atherosclerotic changes.  RETROPERITONEUM/LYMPH NODES: No lymphadenopathy.  ABDOMINAL WALL: Anasarca.  BONES: Degenerative changes.    IMPRESSION:  Mild pulmonary edema and marked anasarca.  A dilated proximal pancreatic duct and mildly dilated biliary tree are   without significant change.      --- End of Report ---            AXEL GOMES MD; Attending Radiologist  This document has been electronically signed. Jul 26 2023  6:26PM      Imaging  Reviewed:  YES/NO    Consultant(s) Notes Reviewed:   YES/ No      Plan of care was discussed with patient and /or primary care giver; all questions and concerns were addressed

## 2023-07-27 NOTE — ED ADULT NURSE REASSESSMENT NOTE - NS ED NURSE REASSESS COMMENT FT1
2 am pt very unco-oprative . no cardiac monitoring , no iv , no blood work   not done since pt refused . ITZ NAJERA made aware

## 2023-07-27 NOTE — BH CONSULTATION LIAISON ASSESSMENT NOTE - CURRENT MEDICATION
MEDICATIONS  (STANDING):  amLODIPine   Tablet 10 milliGRAM(s) Oral daily  aspirin  chewable 81 milliGRAM(s) Oral daily  atorvastatin 40 milliGRAM(s) Oral at bedtime  budesonide 160 MICROgram(s)/formoterol 4.5 MICROgram(s) Inhaler 2 Puff(s) Inhalation two times a day  epoetin beverley (PROCRIT) Injectable 64638 Unit(s) IV Push <User Schedule>  folic acid 1 milliGRAM(s) Oral daily  furosemide    Tablet 80 milliGRAM(s) Oral daily  heparin   Injectable 5000 Unit(s) SubCutaneous every 12 hours  hydrALAZINE 25 milliGRAM(s) Oral three times a day  latanoprost 0.005% Ophthalmic Solution 1 Drop(s) Both EYES at bedtime  levothyroxine 25 MICROGram(s) Oral daily  LORazepam     Tablet 0.5 milliGRAM(s) Oral <User Schedule>  metoclopramide 10 milliGRAM(s) Oral three times a day  metolazone 10 milliGRAM(s) Oral daily  metoprolol succinate ER 50 milliGRAM(s) Oral daily  oxycodone    5 mG/acetaminophen 325 mG 1 Tablet(s) Oral every 6 hours  pantoprazole    Tablet 40 milliGRAM(s) Oral before breakfast  sucralfate 1 Gram(s) Oral four times a day    MEDICATIONS  (PRN):  albuterol    90 MICROgram(s) HFA Inhaler 2 Puff(s) Inhalation every 6 hours PRN for shortness of breath and/or wheezing  ondansetron   Disintegrating Tablet 4 milliGRAM(s) Oral every 6 hours PRN Nausea and/or Vomiting

## 2023-07-27 NOTE — ED ADULT NURSE REASSESSMENT NOTE - NS ED NURSE REASSESS COMMENT FT1
Pt was received in bed. No acute distress noted. Pt is a dialysis patient. Pt has a Right arm dialysis access. Pt is refusing morning medications and VS.  Pt is refusing to be put on CM. Pt was educated on importance and is still refusing. NP made aware. Safety measures maintained. Pt was received in bed. No acute distress noted. Pt is a dialysis patient. Pt has a Right arm dialysis access. Pt is refusing morning medications and VS.  Pt is refusing to be put on CM. Pt was educated on importance and is still refusing. ITZ Lundy is aware. Safety measures maintained.

## 2023-07-27 NOTE — CONSULT NOTE ADULT - SUBJECTIVE AND OBJECTIVE BOX
[facility] : Progress Note :: [phone], (office [phone]),   Dr Mosher / Dr Washington / Dr Young / Dr Doll / Dr Varsha TURCIOS / Dr Tello / Dr Garcia / Dr Larry qiu  _____________________________________________________________________________________________  Patient is a 62y Male whom presented to the hospital with SOB. He has H/O CAD, HTN ESRD on HD TTS at [facility].  He misses many HD sessions and cuts HD time. He also is non-compliant with dietary and fluid recommendations.  Presented to ED with SOB. Noted to have anarsaca. only agreed to one of HD yesterday.      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      Osteoarthritis      Vision loss of left eye  blind      ESRD on hemodialysis  T/Th/S      Cataract  both eyes - hx of sx done      BPH (benign prostatic hyperplasia)      UTI (urinary tract infection)  hx of      Bladder mass  hx of      H/O hematuria      Osteoporosis      Vision loss of right eye  decreased      Depression      Chronic GERD      Osteomyelitis of vertebra      CHF (congestive heart failure)      Below knee amputation status, left  2012- pt is wearing prostesis      History of right cataract extraction      History of left cataract extraction      S/P arteriovenous (AV) fistula creation  right arm brachiocephalic arteriovenous fistula on 11/08/2018      H/O hematuria  s/p bladder bx and fulguration 2/25/2020      H/O transurethral destruction of bladder lesion  2020      History of excision of mass  back mass on 03/31/2021        No Known Drug Allergies  fish (Rash)  liver (Anaphylaxis)    Home Medications Reviewed  Hospital Medications:   MEDICATIONS  (STANDING):  amLODIPine   Tablet 10 milliGRAM(s) Oral daily  aspirin  chewable 81 milliGRAM(s) Oral daily  atorvastatin 40 milliGRAM(s) Oral at bedtime  budesonide 160 MICROgram(s)/formoterol 4.5 MICROgram(s) Inhaler 2 Puff(s) Inhalation two times a day  epoetin beverley (PROCRIT) Injectable 74077 Unit(s) IV Push <User Schedule>  folic acid 1 milliGRAM(s) Oral daily  furosemide    Tablet 80 milliGRAM(s) Oral daily  heparin   Injectable 5000 Unit(s) SubCutaneous every 12 hours  hydrALAZINE 25 milliGRAM(s) Oral three times a day  latanoprost 0.005% Ophthalmic Solution 1 Drop(s) Both EYES at bedtime  levothyroxine 25 MICROGram(s) Oral daily  LORazepam     Tablet 0.5 milliGRAM(s) Oral <User Schedule>  metoclopramide 10 milliGRAM(s) Oral three times a day  metolazone 10 milliGRAM(s) Oral daily  metoprolol succinate ER 50 milliGRAM(s) Oral daily  oxycodone    5 mG/acetaminophen 325 mG 1 Tablet(s) Oral every 6 hours  pantoprazole    Tablet 40 milliGRAM(s) Oral before breakfast  sertraline 50 milliGRAM(s) Oral daily  sucralfate 1 Gram(s) Oral four times a day    SOCIAL HISTORY:  Denies ETOh,Smoking,   FAMILY HISTORY:  Family history of cirrhosis of liver (Mother)    Family history of renal failure (Sibling)    Family history of hypertension (Sibling)    Family history of diabetes mellitus (Sibling)    History of substance abuse in sibling (Sibling)          VITALS:  T(F): 98 (07-27-23 @ 08:45), Max: 98.1 (07-27-23 @ 04:54)  HR: 91 (07-27-23 @ 08:45)  BP: 154/71 (07-27-23 @ 08:45)  RR: 18 (07-27-23 @ 08:45)  SpO2: 81% (07-27-23 @ 08:45)  Wt(kg): --    07-26 @ 07:01  -  07-27 @ 07:00  --------------------------------------------------------  IN: 500 mL / OUT: 1524 mL / NET: -1024 mL        PHYSICAL EXAM:  Constitutional: NAD  HEENT: facial edema   Neck: No JVD  Respiratory: CTAB, no wheezes, rales or rhonchi  Cardiovascular: S1, S2, RRR  Gastrointestinal: BS+, soft, NT/ND. abdominal wall edema++  Extremities:  peripheral edema++  Neurological: A/O x 3, no focal deficits  Vascular Access: RUEAVF with thrill and bruit     LABS:  07-26    132<L>  |  99  |  102<H>  ----------------------------<  103<H>  5.3   |  22  |  15.80<H>    Ca    8.1<L>      26 Jul 2023 14:10  Phos  5.3     07-26  Mg     3.3     07-26    TPro  6.5  /  Alb  2.7<L>  /  TBili  0.7  /  DBili      /  AST  25  /  ALT  42  /  AlkPhos  89  07-26    Creatinine Trend: 15.80 <--                        7.2    2.57  )-----------( 73       ( 26 Jul 2023 14:10 )             22.6     Urine Studies:  Urinalysis Basic - ( 26 Jul 2023 14:10 )    Color:  / Appearance:  / SG:  / pH:   Gluc: 103 mg/dL / Ketone:   / Bili:  / Urobili:    Blood:  / Protein:  / Nitrite:    Leuk Esterase:  / RBC:  / WBC    Sq Epi:  / Non Sq Epi:  / Bacteria:         RADIOLOGY & ADDITIONAL STUDIES:

## 2023-07-27 NOTE — BH CONSULTATION LIAISON ASSESSMENT NOTE - NSICDXBHSECONDARYDX_PSY_ALL_CORE
ESRD on dialysis   N18.6  HTN (hypertension)   I10  DM (diabetes mellitus)   E11.9  HLD (hyperlipidemia)   E78.5  Anemia due to end stage renal disease   N18.6  Prophylactic measure   Z29.9

## 2023-07-27 NOTE — PROGRESS NOTE ADULT - PROBLEM SELECTOR PLAN 2
Pt not compliant with dialysis outpatient frequently miss dialysis  pt refusing telemetry monitoring and phlebotomy while inpatient   dr Linder consulted to r/o depression  pt started on Zoloft 50mg qd, Haldol 0.5mg IM q8 PRN for agitation  No need for an inpatient psychiatric admission or 1:1

## 2023-07-27 NOTE — PATIENT PROFILE ADULT - FALL HARM RISK - HARM RISK INTERVENTIONS
Assistance with ambulation/Assistance OOB with selected safe patient handling equipment/Communicate Risk of Fall with Harm to all staff/Discuss with provider need for PT consult/Monitor gait and stability/Provide patient with walking aids - walker, cane, crutches/Reinforce activity limits and safety measures with patient and family/Tailored Fall Risk Interventions/Use of alarms - bed, chair and/or voice tab/Visual Cue: Yellow wristband and red socks/Bed in lowest position, wheels locked, appropriate side rails in place/Call bell, personal items and telephone in reach/Instruct patient to call for assistance before getting out of bed or chair/Non-slip footwear when patient is out of bed/[place] to call system/Physically safe environment - no spills, clutter or unnecessary equipment/Purposeful Proactive Rounding/Room/bathroom lighting operational, light cord in reach

## 2023-07-27 NOTE — BH CONSULTATION LIAISON ASSESSMENT NOTE - NSBHCHARTREVIEWVS_PSY_A_CORE FT
Vital Signs Last 24 Hrs  T(C): 36.7 (27 Jul 2023 08:45), Max: 36.7 (27 Jul 2023 04:54)  T(F): 98 (27 Jul 2023 08:45), Max: 98.1 (27 Jul 2023 04:54)  HR: 91 (27 Jul 2023 08:45) (79 - 91)  BP: 154/71 (27 Jul 2023 08:45) (129/66 - 167/88)  BP(mean): 91 (27 Jul 2023 08:45) (91 - 91)  RR: 18 (27 Jul 2023 08:45) (17 - 20)  SpO2: 81% (27 Jul 2023 08:45) (81% - 99%)    Parameters below as of 27 Jul 2023 08:45  Patient On (Oxygen Delivery Method): room air

## 2023-07-27 NOTE — PROGRESS NOTE ADULT - SUBJECTIVE AND OBJECTIVE BOX
Patient is a 62y old  Male who presents with a chief complaint of Missed dialysis (2023 12:58)    PATIENT IS SEEN AND EXAMINED IN MEDICAL FLOOR.    ALLERGIES:  No Known Drug Allergies  fish (Rash)  liver (Anaphylaxis)      Daily     Daily Weight in k.1 (2023 15:00)    VITALS:    Vital Signs Last 24 Hrs  T(C): 37.2 (2023 21:15), Max: 37.2 (2023 21:15)  T(F): 99 (2023 21:15), Max: 99 (2023 21:15)  HR: 82 (2023 21:15) (81 - 91)  BP: 131/76 (2023 21:15) (131/76 - 163/100)  BP(mean): 91 (2023 08:45) (91 - 91)  RR: 19 (2023 21:15) (17 - 20)  SpO2: 94% (2023 21:15) (81% - 94%)    Parameters below as of 2023 21:15  Patient On (Oxygen Delivery Method): room air        LABS:    CBC Full  -  ( 2023 14:10 )  WBC Count : 2.57 K/uL  RBC Count : 2.69 M/uL  Hemoglobin : 7.2 g/dL  Hematocrit : 22.6 %  Platelet Count - Automated : 73 K/uL  Mean Cell Volume : 84.0 fl  Mean Cell Hemoglobin : 26.8 pg  Mean Cell Hemoglobin Concentration : 31.9 gm/dL  Auto Neutrophil # : 2.05 K/uL  Auto Lymphocyte # : 0.30 K/uL  Auto Monocyte # : 0.10 K/uL  Auto Eosinophil # : 0.08 K/uL  Auto Basophil # : 0.02 K/uL  Auto Neutrophil % : 79.7 %  Auto Lymphocyte % : 11.7 %  Auto Monocyte % : 3.9 %  Auto Eosinophil % : 3.1 %  Auto Basophil % : 0.8 %          132<L>  |  99  |  102<H>  ----------------------------<  103<H>  5.3   |  22  |  15.80<H>    Ca    8.1<L>      2023 14:10  Phos  5.3     07-  Mg     3.3     07-26    TPro  6.5  /  Alb  2.7<L>  /  TBili  0.7  /  DBili  x   /  AST  25  /  ALT  42  /  AlkPhos  89      CAPILLARY BLOOD GLUCOSE            LIVER FUNCTIONS - ( 2023 14:10 )  Alb: 2.7 g/dL / Pro: 6.5 g/dL / ALK PHOS: 89 U/L / ALT: 42 U/L DA / AST: 25 U/L / GGT: x           Creatinine Trend: 15.80<--, 17.20<--, 15.90<--, 10.60<--, 10.80<--  I&O's Summary    2023 07:  -  2023 07:00  --------------------------------------------------------  IN: 500 mL / OUT: 1524 mL / NET: -1024 mL    2023 07:01  -  2023 23:06  --------------------------------------------------------  IN: 400 mL / OUT: 1997 mL / NET: -1597 mL            .Blood Blood-Peripheral   @ 14:07   No Growth Final  --  --          MEDICATIONS:    MEDICATIONS  (STANDING):  albuterol/ipratropium for Nebulization 3 milliLiter(s) Nebulizer every 6 hours  amLODIPine   Tablet 10 milliGRAM(s) Oral daily  aspirin  chewable 81 milliGRAM(s) Oral daily  atorvastatin 40 milliGRAM(s) Oral at bedtime  budesonide 160 MICROgram(s)/formoterol 4.5 MICROgram(s) Inhaler 2 Puff(s) Inhalation two times a day  epoetin beverley (PROCRIT) Injectable 61705 Unit(s) IV Push <User Schedule>  folic acid 1 milliGRAM(s) Oral daily  furosemide    Tablet 80 milliGRAM(s) Oral daily  heparin   Injectable 5000 Unit(s) SubCutaneous every 12 hours  hydrALAZINE 25 milliGRAM(s) Oral three times a day  latanoprost 0.005% Ophthalmic Solution 1 Drop(s) Both EYES at bedtime  levothyroxine 25 MICROGram(s) Oral daily  LORazepam     Tablet 0.5 milliGRAM(s) Oral <User Schedule>  metoclopramide 10 milliGRAM(s) Oral three times a day  metolazone 10 milliGRAM(s) Oral daily  metoprolol succinate ER 50 milliGRAM(s) Oral daily  oxycodone    5 mG/acetaminophen 325 mG 1 Tablet(s) Oral every 6 hours  pantoprazole    Tablet 40 milliGRAM(s) Oral before breakfast  sertraline 50 milliGRAM(s) Oral daily  sevelamer carbonate 800 milliGRAM(s) Oral three times a day  sucralfate 1 Gram(s) Oral four times a day      MEDICATIONS  (PRN):  haloperidol    Injectable 0.5 milliGRAM(s) IntraMuscular every 8 hours PRN Agitation  ondansetron   Disintegrating Tablet 4 milliGRAM(s) Oral every 6 hours PRN Nausea and/or Vomiting      REVIEW OF SYSTEMS:                           ALL ROS DONE [ X   ]    CONSTITUTIONAL:  LETHARGIC [   ], FEVER [   ], UNRESPONSIVE [   ]  CVS:  CP  [   ], SOB, [   ], PALPITATIONS [   ], DIZZYNESS [   ]  RS: COUGH [   ], SPUTUM [   ]  GI: ABDOMINAL PAIN [   ], NAUSEA [   ], VOMITINGS [   ], DIARRHEA [   ], CONSTIPATION [   ]  :  DYSURIA [   ], NOCTURIA [   ], INCREASED FREQUENCY [   ], DRIBLING [   ],  SKELETAL: PAINFUL JOINTS [   ], SWOLLEN JOINTS [   ], NECK ACHE [   ], LOW BACK ACHE [   ],  SKIN : ULCERS [   ], RASH [   ], ITCHING [   ]  CNS: HEAD ACHE [   ], DOUBLE VISION [   ], BLURRED VISION [   ], AMS / CONFUSION [   ], SEIZURES [   ], WEAKNESS [   ],TINGLING / NUMBNESS [   ]    PHYSICAL EXAMINATION:  GENERAL APPEARANCE: NO DISTRESS,    ANASARCA ++  HEENT:  NO PALLOR, NO  JVD,  NO   NODES, NECK SUPPLE  CVS: S1 +, S2 +,   RS: AEEB,  OCCASIONAL  RALES +,   CRACKLES+ AT LUNG BASES  ABD: SOFT, NT, NO, BS +  EXT: LEFT BKA  SKIN: WARM,   SKELETAL:  ROM ACCEPTABLE  CNS:  AAO X  3      RADIOLOGY :      ASSESSMENT :     Hypervolemia    No pertinent past medical history    Hypertension    Adrenal insufficiency    CKD (chronic kidney disease)    Anemia    Glaucoma    Coronary artery disease    HLD (hyperlipidemia)    Peripheral vascular disease    Spinal stenosis of lumbosacral region    Hyperparathyroidism    Diabetes mellitus    Diabetic neuropathy    Contracture of hand    Osteoarthritis    Osteoporosis    Vision loss of left eye    ESRD on hemodialysis    Cataract    BPH (benign prostatic hyperplasia)    UTI (urinary tract infection)    Bladder mass    H/O hematuria    Osteoporosis    Vision loss of right eye    Depression    Chronic GERD    Osteomyelitis of vertebra    CHF (congestive heart failure)    No significant past surgical history    Below knee amputation status, left    History of right cataract extraction    History of left cataract extraction    S/P arteriovenous (AV) fistula creation    H/O hematuria    H/O transurethral destruction of bladder lesion    History of excision of mass        PLAN:  HPI:  Patient is 62 year old male from [facility], walks with RW, with PMH of ESRD on HD (TTS), BKA- L w/prosthetic leg, DM, Left eye blindness, CHF, CAD, HTN, HLD, osteoporosis, bronchitis, current smoker, and anemia who presents with complaint of missed dialysis. Last HD . Pt states he often missed HD sessions.  patient states he missed this HD session due to mild pain in left leg, patient remains able to ambulate. Pt complains of right leg swelling and states he does not make any urine. Patient states he was having mild shortness of breath.  Pt denies any fever, chills, N/V/D, constipation, CP, numbness or tingling (2023 19:20)    # PATIENT W/ HISTORY OF ROUTINE NONCOMPLIANCE W/ LIFE-SUSTAINING TREATMENT [HD], EVALUATIONS AND INTERVENTIONS - COUNSELLED AT LENGTH TO REMAIN COMPLIANT. D/W PATIENT THAT PROGNOSIS IS GRIM/POOR. HE VERBALIZED UNDERSTANDING. REGARDING GOC - PATIENT WISHES FOR FULL CODE. PALLIATIVE CARE CONSULTED. PSYCHIATRY CONSULTED.  - PATIENT IS ORIENTED TO PERSON, PLACE AND CIRCUMSTANCE. HE EXPRESSED THAT HE DOES GROW IMPATIENT DURING DIALYSIS SESSIONS AND HAS BEEN LEAVING SESSIONS SOONER THAN PRESCRIBED. IN DISCUSSION THAT RISKS OF DEFERRING COMPLETE HD - INCLUDE BUT ARE NOT LIMITED TO WORSENING CLINICAL CONDITION, ELECTROLYTE ABNORMALITIES, ARRHYTHMIA AND DEATH. HE VERBALIZED UNDERSTANDING. HE REFUSES TO CONSIDER A NURSING HOME WITH ONSITE HD.     # ACUTE ON CHRONIC HYPOXIC RESPIRATORY FAILURE S/T PULMONARY EDEMA - S/T INCOMPLETE HD SESSIONS ; ANASARCA  # HX OF COPD + S/P RECENT MULTIFOCAL PNEUMONIA  # UNCONTROLLED HTN  # ESRD ON HD TTS - W/ HX OF NONCOMPLIANCE      - HYDRALAZINE, METOPROLOL AND NORVASC  - SUPPLEMENTAL O2  - PLANNED FOR HD  - NEPHROLOGY CONSULT    - PLANNED FOR HD ,     # ELEVATED TROPONINS - ? DEMAND ISCHEMIA    - MONITOR ON TELEMETRY  - TREND TROPONINS  -  F/U ECHOCARDIOGRAM  - CARDIOLOGY CONSULT      # HX OF RECURRENT INTRACTABLE NAUSEA/VOMITING, GASTROPARESIS  # HISTORY OF ESOPHAGITIS AND DUODENITIS [2021], HX OF GASTROPARESIS W/ EVIDENCE OF THICKENED ESOPHAGUS ON PREVIOUS CT SCAN   # PANCREAS W/ DOUBLE DUCT SIGN    - DENIES RECENT HEMATEMESIS   - MONITORING HGB, PLACED ON PPI BID , CARAFATE TID  - CARAFATE, PRN ANTIEMETICS  - MONITORING FOR SYMPTOMS  - TOLERATING DIET ; PATIENT REPEATEDLY COUNSELLED TO CHEW FOOD CAUTIOUSLY AND CONSUME SMALL BITES W/ REGULAR CLEARING WITH WATER BETWEEN BITES.    - S/P RECENT PRBC TRANSFUSION     - RECENT GI CONSULT - RECOMMENDED OUTPATIENT F/U    # GASTROPARESIS  # UNDERLYING DM  - SSI + FS    # PATIENT UNDERGOING OUTPATIENT WORKUP FOR CENTRAL VENOUS STENOSIS THROUGH NEPHROLOGY TEAM  - ? s/p STENT PLACEMENT    # ANEMIA OF CKD  # HX OF PANCYTOPENIA   - TREND HGB, TRANSFUSION THRESHOLD HGB < 7  - TYPE AND SCREEN  - ON EPO  - DENIES RECENT HEMATEMESIS, MELENA, HEMATOCHEZIA    - S/P RECENT PRBC TRANSFUSION    # SEVERE PROTEIN CALORIE MALNUTRITION, FAILURE TO THRIVE - NUTRITIONAL SUPPLEMENT    # HLD  # PARTIALLY BLIND  # S/P LEFT BKA  # HX OF PVD  # LS SPINAL STENOSIS  # GI AND DVT PPX .

## 2023-07-27 NOTE — BH CONSULTATION LIAISON ASSESSMENT NOTE - RISK ASSESSMENT
Elevated risk given death wishes and worsening health, but no evidence of past SA's or hospitalizations. Low to moderate risk

## 2023-07-27 NOTE — PROGRESS NOTE ADULT - ASSESSMENT
[facility] is 62 [facility], with PMH of ESRD on HD (TTS), BKA- L w/prosthetic leg, DM, Left eye blindness, CHF, CAD, HTN, HLD, osteoporosis, bronchitis, current smoker, and anemia who presents with complaint of missed dialysis. Last HD 7/22. Pt states he often missed HD sessions.   Pt complains of right leg swelling and states he does not make any urine. Patient states he was having mild shortness of breath. Patient admitted to tele for missed dialysis found to have pulmonary edema and BNP [id number]. Cardiology and nephro consulted. Patient undergo 1h dialysis session yesterday and refused to complete full hd. Patient also require cardiac monitoring due to severe CHF and fluid overload but refusing to wear tele monitor. Patient was made aware of importance of dialysis, blood drawing and other testing as well medication regiment. Patient was informed if he continue to refuse treatment his condition may, worsen including death. patient verbalized understanding and still refused to wear cardiac monitoring and daily phlebotomy.

## 2023-07-28 NOTE — CONSULT NOTE ADULT - PROBLEM SELECTOR RECOMMENDATION 4
Clinical evidence indicates that the patient has Moderate protein calorie malnutrition/ 2nd degree  In context of Chronic Illness (>1 month)  Energy/Food intake <75% of estimated energy requirement >7 days  Weight loss: Mild  (lbs lost recently)  Body Fat loss: Mild    Muscle mass loss: Mild   Fluid Accumulation: Mild    Strength: weakened Mild     Recommend:   c/w soft diet, aspiration precautions, keep head of bed at least 45 degrees during feeding

## 2023-07-28 NOTE — CONSULT NOTE ADULT - PROBLEM SELECTOR RECOMMENDATION 2
-pt presenting with difficulty tolerating duration and frequency of HD sessions and aware of the risks of noncompliance including increased risk of morbidity, mortality, and re-hospitalization  -psych on board, recommendations appreciated, agree with trial of zoloft  -continue with psychosocial support

## 2023-07-28 NOTE — CONSULT NOTE ADULT - PROBLEM SELECTOR RECOMMENDATION 5
-GOC as above, continue to be disease-directed  -MOLST drafted, full code at this time      Discussed with primary team. -GOC as above, continue to be disease-directed  -MOLST drafted, full code at this time      Discussed with primary team.    Palliative care to sign-off, please re-call PRN pending clinical course.

## 2023-07-28 NOTE — CONSULT NOTE ADULT - SUBJECTIVE AND OBJECTIVE BOX
Consult to: Discuss complex medical decision making related to goals of care    [facility]iatric and Palliative Consult Service:  Kathryn Ana Maria DO: cell ([phone])  Herb Bonilla MD: cell ([phone])  Michael Rousseau NP: cell ([phone])   Tristin Streeter SW: cell ([phone])   Dot Galicia NP: via [employer] Teams    Can contact any Palliative Team member via [employer] Teams for consults and questions      HPI:  Patient is 62 year old male from [facility], walks with RW, with PMH of ESRD on HD (TTS), BKA- L w/prosthetic leg, DM, Left eye blindness, CHF, CAD, HTN, HLD, osteoporosis, bronchitis, current smoker, and anemia who presents with complaint of missed dialysis. Last HD 7/22. Pt states he often missed HD sessions.  patient states he missed this HD session due to mild pain in left leg, patient remains able to ambulate. Pt complains of right leg swelling and states he does not make any urine. Patient states he was having mild shortness of breath.  Pt denies any fever, chills, N/V/D, constipation, CP, numbness or tingling (26 Jul 2023 19:20)    Interval History: Seen at the bedside, lethargic but arousable A/Ox2, aware of why he is in the hospital, denies pain/dyspnea.      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      Osteoarthritis      Vision loss of left eye  blind      ESRD on hemodialysis  T/Th/S      Cataract  both eyes - hx of sx done      BPH (benign prostatic hyperplasia)      UTI (urinary tract infection)  hx of      Bladder mass  hx of      H/O hematuria      Osteoporosis      Vision loss of right eye  decreased      Depression      Chronic GERD      Osteomyelitis of vertebra      CHF (congestive heart failure)      Below knee amputation status, left  2012- pt is wearing prostesis      History of right cataract extraction      History of left cataract extraction      S/P arteriovenous (AV) fistula creation  right arm brachiocephalic arteriovenous fistula on 11/08/2018      H/O hematuria  s/p bladder bx and fulguration 2/25/2020      H/O transurethral destruction of bladder lesion  2020      History of excision of mass  back mass on 03/31/2021          SOCIAL HISTORY:    Admitted from:    assisted living, ambulates RW, requires some assistance with ADLs mostly independent        [ none ] Substance abuse, [ none ] Tobacco hx, [ none ] Alcohol hx, [ none ] Home Opioid Hx    FAMILY HISTORY:  Family history of cirrhosis of liver (Mother)    Family history of renal failure (Sibling)    Family history of hypertension (Sibling)    Family history of diabetes mellitus (Sibling)    History of substance abuse in sibling (Sibling)     unable to obtain from patient due to poor mentation, family unable to give information, see H&P for history  Baseline ADLs (prior to admission):    Allergies    No Known Drug Allergies  fish (Rash)  liver (Anaphylaxis)    Intolerances      Present Symptoms: Mild, Moderate, Severe  Pain: denies             Location -                               Aggravating factors -             Quality -             Radiation -             Timing-             Severity (0-10 scale):             Minimal acceptable level (0-10 scale):  Fatigue: severe  Nausea: denies  Lack of Appetite: moderate  SOB: denies  Depression: denies  Anxiety: denies  Review of Systems: All others negative     CPOT:    https://www.sccm.org/getattachment/nog50t89-5w9h-0u3x-7o4d-7241q1744i7w/Critical-Care-Pain-Observation-Tool-(CPOT)  PAIN AD Score:   http://geriatrictoolkit.[place].[email or web address]/painad.pdf (press ctrl +  left click to view)      MEDICATIONS  (STANDING):  albuterol/ipratropium for Nebulization 3 milliLiter(s) Nebulizer every 6 hours  amLODIPine   Tablet 10 milliGRAM(s) Oral daily  aspirin  chewable 81 milliGRAM(s) Oral daily  atorvastatin 40 milliGRAM(s) Oral at bedtime  budesonide 160 MICROgram(s)/formoterol 4.5 MICROgram(s) Inhaler 2 Puff(s) Inhalation two times a day  epoetin beverley (PROCRIT) Injectable 51210 Unit(s) IV Push <User Schedule>  folic acid 1 milliGRAM(s) Oral daily  furosemide    Tablet 80 milliGRAM(s) Oral daily  heparin   Injectable 5000 Unit(s) SubCutaneous every 12 hours  hydrALAZINE 25 milliGRAM(s) Oral three times a day  latanoprost 0.005% Ophthalmic Solution 1 Drop(s) Both EYES at bedtime  levothyroxine 25 MICROGram(s) Oral daily  LORazepam     Tablet 0.5 milliGRAM(s) Oral <User Schedule>  metoclopramide 10 milliGRAM(s) Oral three times a day  metolazone 10 milliGRAM(s) Oral daily  metoprolol succinate ER 50 milliGRAM(s) Oral daily  oxycodone    5 mG/acetaminophen 325 mG 1 Tablet(s) Oral every 6 hours  pantoprazole    Tablet 40 milliGRAM(s) Oral before breakfast  sertraline 50 milliGRAM(s) Oral daily  sevelamer carbonate 800 milliGRAM(s) Oral three times a day  sucralfate 1 Gram(s) Oral four times a day    MEDICATIONS  (PRN):  haloperidol    Injectable 0.5 milliGRAM(s) IntraMuscular every 8 hours PRN Agitation  ondansetron   Disintegrating Tablet 4 milliGRAM(s) Oral every 6 hours PRN Nausea and/or Vomiting      PHYSICAL EXAM:  Vital Signs Last 24 Hrs  T(C): 37.2 (28 Jul 2023 11:14), Max: 37.2 (27 Jul 2023 21:15)  T(F): 99 (28 Jul 2023 11:14), Max: 99 (27 Jul 2023 21:15)  HR: 96 (28 Jul 2023 11:14) (81 - 96)  BP: 142/76 (28 Jul 2023 11:14) (131/76 - 164/85)  BP(mean): --  RR: 20 (28 Jul 2023 11:14) (18 - 20)  SpO2: 96% (28 Jul 2023 11:14) (86% - 96%)    Parameters below as of 28 Jul 2023 11:14  Patient On (Oxygen Delivery Method): nasal cannula  O2 Flow (L/min): 2      General: alert  oriented x 2-3  chronically ill appearing male lethargic but arousable verbally responsive    Palliative Performance Scale/Karnofsky Score: 40%  http://npcrc.org/files/news/palliative_performance_scale_ppsv2.pdf    HEENT: no abnormal lesion, dry mouth    Lungs: CTA  CV: RRR, S1S2  GI: soft non distended non tender   : oliguric  Musculoskeletal: weakness x4 s/p L BKA edema x4    ambulatory with assistance limited by fatigue/lethargy  Skin: no abnormal skin lesions, poor skin turgor  Neuro: no deficits, mild cognitive impairment, lethargic but easily arousable to verbal stimuli, alert to self, situation, confused about time, easily re-oriented, follows commands and responds to questions appropriately  Oral intake ability:  full capability    LABS:                        7.0    3.47  )-----------( 62       ( 28 Jul 2023 10:35 )             22.4     07-28    139  |  101  |  106<H>  ----------------------------<  130<H>  5.4<H>   |  22  |  15.60<H>    Ca    8.0<L>      28 Jul 2023 10:35  Phos  5.7     07-28  Mg     3.3     07-26    TPro  6.5  /  Alb  2.7<L>  /  TBili  0.7  /  DBili  x   /  AST  25  /  ALT  42  /  AlkPhos  89  07-26    Urinalysis Basic - ( 28 Jul 2023 10:35 )    Color: x / Appearance: x / SG: x / pH: x  Gluc: 130 mg/dL / Ketone: x  / Bili: x / Urobili: x   Blood: x / Protein: x / Nitrite: x   Leuk Esterase: x / RBC: x / WBC x   Sq Epi: x / Non Sq Epi: x / Bacteria: x        RADIOLOGY & ADDITIONAL STUDIES:

## 2023-07-28 NOTE — CHART NOTE - NSCHARTNOTEFT_GEN_A_CORE
RUBY LAGUNA received verbal consult to help pt's brother speak with the pt. RUBY LAGUNA met with the pt at bedside, who stated that he would like for his brother to have his hospital phone extension. RUBY LAGUNA called pt's brother Fernando ([phone]) and gave him the pt's hospital extension. RUBY LAGUNA to follow up as needed.

## 2023-07-28 NOTE — CONSULT NOTE ADULT - CONVERSATION DETAILS
Met with the pt at the bedside to discuss the GOC. He was lethargic, eyes closed, but he was able to express his feelings and wishes regarding his medical care. A/Ox2-3, able to name the year, confused about exact date. He expressed clear wishes to continue with HD but he doesn't want to go for 3 hours. Much time spent empathetic listening and emotional support regarding the burdens of longterm HD. Educated regarding ESRD trajectory and options for the cessation of HD. He clearly stated he does not want to stop HD. He wants to continue because his fiance will leave him if he does not go. He is aware that stopping HD will. Met with the pt at the bedside to discuss the GOC. He was lethargic, eyes closed, but he was able to express his feelings and wishes regarding his medical care. A/Ox2-3, able to name the year, confused about exact date. He expressed clear wishes to continue with HD but he doesn't want to go for 3 hours.  Educated regarding ESRD trajectory and options for the cessation of HD. He clearly stated he does not want to stop HD. He wants to continue because his fiance will leave him if he does not go. He is aware that stopping HD means he will die, and educated regarding hospice care and EOL issues. He was clear that he wishes to continue with HD, but frustrated with the sessions. Much time spent empathetic listening and emotional support regarding the burdens of longterm HD. Reports that his brother is the HCP and aware of his wishes. Educated on MOLST form and benefits/burdens of CPR vs DNR at length and he stated his wishes are for full code and provided verbal consent for MOLST and form drafted and placed on chart.     Call placed to the pt's brother Fernando and we reviewed the recent events, present clinical status and the pmhx. He stated that he is aware of the situation, he has been trying to encourage the pt to attend his HD sessions as prescribed. Counseled on MOLST form and the limited benefit of CPR/intubation and mechanical ventilation in the setting of ESRD and educated on DNR/natural death at length he verbalized understanding. He stated he is aware that his brother's wishes are not always compatible with one another and that his goal is to support his decision-making and understands regarding his overall guarded prognosis and in EOL situation the priority would be on limited interventions and transition to comfort care. Much support provided. Aware he is a full code at this time.

## 2023-07-28 NOTE — PROGRESS NOTE ADULT - PROBLEM SELECTOR PLAN 3
Controlled  Takes Hydralazine, Metoprolol and Norvasc at home  Continue home regimen   Monitor BP  DASH diet

## 2023-07-28 NOTE — PROGRESS NOTE ADULT - SUBJECTIVE AND OBJECTIVE BOX
Patient is a 62y old  Male who presents with a chief complaint of Missed dialysis (2023 10:45)    PATIENT IS SEEN AND EXAMINED IN MEDICAL FLOOR.  SULTANA [    ]    JEREMY [   ]      GT [   ]    ALLERGIES:  No Known Drug Allergies  fish (Rash)  liver (Anaphylaxis)      Daily     Daily Weight in k.1 (2023 15:00)    VITALS:    Vital Signs Last 24 Hrs  T(C): 36.9 (2023 07:53), Max: 37.2 (2023 21:15)  T(F): 98.4 (2023 07:53), Max: 99 (2023 21:15)  HR: 86 (2023 07:53) (81 - 88)  BP: 156/96 (2023 07:53) (131/76 - 164/85)  BP(mean): --  RR: 19 (2023 07:53) (18 - 20)  SpO2: 94% (2023 07:53) (86% - 96%)    Parameters below as of 2023 07:53  Patient On (Oxygen Delivery Method): room air        LABS:    CBC Full  -  ( 2023 14:10 )  WBC Count : 2.57 K/uL  RBC Count : 2.69 M/uL  Hemoglobin : 7.2 g/dL  Hematocrit : 22.6 %  Platelet Count - Automated : 73 K/uL  Mean Cell Volume : 84.0 fl  Mean Cell Hemoglobin : 26.8 pg  Mean Cell Hemoglobin Concentration : 31.9 gm/dL  Auto Neutrophil # : 2.05 K/uL  Auto Lymphocyte # : 0.30 K/uL  Auto Monocyte # : 0.10 K/uL  Auto Eosinophil # : 0.08 K/uL  Auto Basophil # : 0.02 K/uL  Auto Neutrophil % : 79.7 %  Auto Lymphocyte % : 11.7 %  Auto Monocyte % : 3.9 %  Auto Eosinophil % : 3.1 %  Auto Basophil % : 0.8 %          132<L>  |  99  |  102<H>  ----------------------------<  103<H>  5.3   |  22  |  15.80<H>    Ca    8.1<L>      2023 14:10  Phos  5.3     07-  Mg     3.3     07-26    TPro  6.5  /  Alb  2.7<L>  /  TBili  0.7  /  DBili  x   /  AST  25  /  ALT  42  /  AlkPhos  89      CAPILLARY BLOOD GLUCOSE            LIVER FUNCTIONS - ( 2023 14:10 )  Alb: 2.7 g/dL / Pro: 6.5 g/dL / ALK PHOS: 89 U/L / ALT: 42 U/L DA / AST: 25 U/L / GGT: x           Creatinine Trend: 15.80<--, 17.20<--, 15.90<--, 10.60<--, 10.80<--  I&O's Summary    2023 07:01  -  2023 07:00  --------------------------------------------------------  IN: 400 mL / OUT: 1997 mL / NET: -1597 mL            .Blood Blood-Peripheral   @ 14:07   No Growth Final  --  --          MEDICATIONS:    MEDICATIONS  (STANDING):  albuterol/ipratropium for Nebulization 3 milliLiter(s) Nebulizer every 6 hours  amLODIPine   Tablet 10 milliGRAM(s) Oral daily  aspirin  chewable 81 milliGRAM(s) Oral daily  atorvastatin 40 milliGRAM(s) Oral at bedtime  budesonide 160 MICROgram(s)/formoterol 4.5 MICROgram(s) Inhaler 2 Puff(s) Inhalation two times a day  epoetin beverley (PROCRIT) Injectable 09263 Unit(s) IV Push <User Schedule>  folic acid 1 milliGRAM(s) Oral daily  furosemide    Tablet 80 milliGRAM(s) Oral daily  heparin   Injectable 5000 Unit(s) SubCutaneous every 12 hours  hydrALAZINE 25 milliGRAM(s) Oral three times a day  latanoprost 0.005% Ophthalmic Solution 1 Drop(s) Both EYES at bedtime  levothyroxine 25 MICROGram(s) Oral daily  LORazepam     Tablet 0.5 milliGRAM(s) Oral <User Schedule>  metoclopramide 10 milliGRAM(s) Oral three times a day  metolazone 10 milliGRAM(s) Oral daily  metoprolol succinate ER 50 milliGRAM(s) Oral daily  oxycodone    5 mG/acetaminophen 325 mG 1 Tablet(s) Oral every 6 hours  pantoprazole    Tablet 40 milliGRAM(s) Oral before breakfast  sertraline 50 milliGRAM(s) Oral daily  sevelamer carbonate 800 milliGRAM(s) Oral three times a day  sucralfate 1 Gram(s) Oral four times a day      MEDICATIONS  (PRN):  haloperidol    Injectable 0.5 milliGRAM(s) IntraMuscular every 8 hours PRN Agitation  ondansetron   Disintegrating Tablet 4 milliGRAM(s) Oral every 6 hours PRN Nausea and/or Vomiting      REVIEW OF SYSTEMS:                           ALL ROS DONE [ X   ]    CONSTITUTIONAL:  LETHARGIC [   ], FEVER [   ], UNRESPONSIVE [   ]  CVS:  CP  [   ], SOB, [   ], PALPITATIONS [   ], DIZZYNESS [   ]  RS: COUGH [   ], SPUTUM [   ]  GI: ABDOMINAL PAIN [   ], NAUSEA [   ], VOMITINGS [   ], DIARRHEA [   ], CONSTIPATION [   ]  :  DYSURIA [   ], NOCTURIA [   ], INCREASED FREQUENCY [   ], DRIBLING [   ],  SKELETAL: PAINFUL JOINTS [   ], SWOLLEN JOINTS [   ], NECK ACHE [   ], LOW BACK ACHE [   ],  SKIN : ULCERS [   ], RASH [   ], ITCHING [   ]  CNS: HEAD ACHE [   ], DOUBLE VISION [   ], BLURRED VISION [   ], AMS / CONFUSION [   ], SEIZURES [   ], WEAKNESS [   ],TINGLING / NUMBNESS [   ]    PHYSICAL EXAMINATION:  GENERAL APPEARANCE: NO DISTRESS,    ANASARCA ++  HEENT:  NO PALLOR, NO  JVD,  NO   NODES, NECK SUPPLE  CVS: S1 +, S2 +,   RS: AEEB,  OCCASIONAL  RALES +,   CRACKLES+ AT LUNG BASES  ABD: SOFT, NT, NO, BS +  EXT: LEFT BKA  SKIN: WARM,   SKELETAL:  ROM ACCEPTABLE  CNS:  AAO X  3      RADIOLOGY :      ASSESSMENT :     Hypervolemia    No pertinent past medical history    Hypertension    Adrenal insufficiency    CKD (chronic kidney disease)    Anemia    Glaucoma    Coronary artery disease    HLD (hyperlipidemia)    Peripheral vascular disease    Spinal stenosis of lumbosacral region    Hyperparathyroidism    Diabetes mellitus    Diabetic neuropathy    Contracture of hand    Osteoarthritis    Osteoporosis    Vision loss of left eye    ESRD on hemodialysis    Cataract    BPH (benign prostatic hyperplasia)    UTI (urinary tract infection)    Bladder mass    H/O hematuria    Osteoporosis    Vision loss of right eye    Depression    Chronic GERD    Osteomyelitis of vertebra    CHF (congestive heart failure)    No significant past surgical history    Below knee amputation status, left    History of right cataract extraction    History of left cataract extraction    S/P arteriovenous (AV) fistula creation    H/O hematuria    H/O transurethral destruction of bladder lesion    History of excision of mass        PLAN:  HPI:  Patient is 62 year old male from [facility], walks with RW, with PMH of ESRD on HD (TTS), BKA- L w/prosthetic leg, DM, Left eye blindness, CHF, CAD, HTN, HLD, osteoporosis, bronchitis, current smoker, and anemia who presents with complaint of missed dialysis. Last HD . Pt states he often missed HD sessions.  patient states he missed this HD session due to mild pain in left leg, patient remains able to ambulate. Pt complains of right leg swelling and states he does not make any urine. Patient states he was having mild shortness of breath.  Pt denies any fever, chills, N/V/D, constipation, CP, numbness or tingling (2023 19:20)    # PATIENT W/ HISTORY OF ROUTINE NONCOMPLIANCE W/ LIFE-SUSTAINING TREATMENT [HD], EVALUATIONS AND INTERVENTIONS - COUNSELLED AT LENGTH TO REMAIN COMPLIANT. D/W PATIENT THAT PROGNOSIS IS GRIM/POOR. HE VERBALIZED UNDERSTANDING. REGARDING GOC - PATIENT WISHES FOR FULL CODE. PALLIATIVE CARE CONSULTED. PSYCHIATRY CONSULTED.  - PATIENT IS ORIENTED TO PERSON, PLACE AND CIRCUMSTANCE. HE EXPRESSED THAT HE DOES GROW IMPATIENT DURING DIALYSIS SESSIONS AND HAS BEEN LEAVING SESSIONS SOONER THAN PRESCRIBED. IN DISCUSSION THAT RISKS OF DEFERRING COMPLETE HD - INCLUDE BUT ARE NOT LIMITED TO WORSENING CLINICAL CONDITION, ELECTROLYTE ABNORMALITIES, ARRHYTHMIA AND DEATH. HE VERBALIZED UNDERSTANDING. HE REFUSES TO CONSIDER A NURSING HOME WITH ONSITE HD.     # ACUTE ON CHRONIC HYPOXIC RESPIRATORY FAILURE S/T PULMONARY EDEMA - S/T INCOMPLETE HD SESSIONS ; ANASARCA  # HX OF COPD + S/P RECENT MULTIFOCAL PNEUMONIA  # UNCONTROLLED HTN  # ESRD ON HD TTS - W/ HX OF NONCOMPLIANCE      - HYDRALAZINE, METOPROLOL AND NORVASC  - SUPPLEMENTAL O2  - PLANNED FOR HD  - NEPHROLOGY CONSULT    - PLANNED FOR HD ,     # ELEVATED TROPONINS - ? DEMAND ISCHEMIA    - MONITOR ON TELEMETRY  - TREND TROPONINS  -  F/U ECHOCARDIOGRAM  - CARDIOLOGY CONSULT      # HX OF RECURRENT INTRACTABLE NAUSEA/VOMITING, GASTROPARESIS  # HISTORY OF ESOPHAGITIS AND DUODENITIS [2021], HX OF GASTROPARESIS W/ EVIDENCE OF THICKENED ESOPHAGUS ON PREVIOUS CT SCAN   # PANCREAS W/ DOUBLE DUCT SIGN    - DENIES RECENT HEMATEMESIS   - MONITORING HGB, PLACED ON PPI BID , CARAFATE TID  - CARAFATE, PRN ANTIEMETICS  - MONITORING FOR SYMPTOMS  - TOLERATING DIET ; PATIENT REPEATEDLY COUNSELLED TO CHEW FOOD CAUTIOUSLY AND CONSUME SMALL BITES W/ REGULAR CLEARING WITH WATER BETWEEN BITES.    - S/P RECENT PRBC TRANSFUSION     - RECENT GI CONSULT - RECOMMENDED OUTPATIENT F/U    # GASTROPARESIS  # UNDERLYING DM  - SSI + FS    # PATIENT UNDERGOING OUTPATIENT WORKUP FOR CENTRAL VENOUS STENOSIS THROUGH NEPHROLOGY TEAM  - ? s/p STENT PLACEMENT    # ANEMIA OF CKD  # HX OF PANCYTOPENIA   - TREND HGB, TRANSFUSION THRESHOLD HGB < 7  - TYPE AND SCREEN  - ON EPO  - DENIES RECENT HEMATEMESIS, MELENA, HEMATOCHEZIA    - S/P RECENT PRBC TRANSFUSION    # SEVERE PROTEIN CALORIE MALNUTRITION, FAILURE TO THRIVE - NUTRITIONAL SUPPLEMENT    # HLD  # PARTIALLY BLIND  # S/P LEFT BKA  # HX OF PVD  # LS SPINAL STENOSIS  # GI AND DVT PPX .   Patient is a 62y old  Male who presents with a chief complaint of Missed dialysis (2023 10:45)    PATIENT IS SEEN AND EXAMINED IN MEDICAL FLOOR.      ALLERGIES:  No Known Drug Allergies  fish (Rash)  liver (Anaphylaxis)      Daily     Daily Weight in k.1 (2023 15:00)    VITALS:    Vital Signs Last 24 Hrs  T(C): 36.9 (2023 07:53), Max: 37.2 (2023 21:15)  T(F): 98.4 (2023 07:53), Max: 99 (2023 21:15)  HR: 86 (2023 07:53) (81 - 88)  BP: 156/96 (2023 07:53) (131/76 - 164/85)  BP(mean): --  RR: 19 (2023 07:53) (18 - 20)  SpO2: 94% (2023 07:53) (86% - 96%)    Parameters below as of 2023 07:53  Patient On (Oxygen Delivery Method): room air        LABS:    CBC Full  -  ( 2023 14:10 )  WBC Count : 2.57 K/uL  RBC Count : 2.69 M/uL  Hemoglobin : 7.2 g/dL  Hematocrit : 22.6 %  Platelet Count - Automated : 73 K/uL  Mean Cell Volume : 84.0 fl  Mean Cell Hemoglobin : 26.8 pg  Mean Cell Hemoglobin Concentration : 31.9 gm/dL  Auto Neutrophil # : 2.05 K/uL  Auto Lymphocyte # : 0.30 K/uL  Auto Monocyte # : 0.10 K/uL  Auto Eosinophil # : 0.08 K/uL  Auto Basophil # : 0.02 K/uL  Auto Neutrophil % : 79.7 %  Auto Lymphocyte % : 11.7 %  Auto Monocyte % : 3.9 %  Auto Eosinophil % : 3.1 %  Auto Basophil % : 0.8 %          132<L>  |  99  |  102<H>  ----------------------------<  103<H>  5.3   |  22  |  15.80<H>    Ca    8.1<L>      2023 14:10  Phos  5.3     -  Mg     3.3     07-26    TPro  6.5  /  Alb  2.7<L>  /  TBili  0.7  /  DBili  x   /  AST  25  /  ALT  42  /  AlkPhos  89  07-    CAPILLARY BLOOD GLUCOSE            LIVER FUNCTIONS - ( 2023 14:10 )  Alb: 2.7 g/dL / Pro: 6.5 g/dL / ALK PHOS: 89 U/L / ALT: 42 U/L DA / AST: 25 U/L / GGT: x           Creatinine Trend: 15.80<--, 17.20<--, 15.90<--, 10.60<--, 10.80<--  I&O's Summary    2023 07:01  -  2023 07:00  --------------------------------------------------------  IN: 400 mL / OUT: 1997 mL / NET: -1597 mL            .Blood Blood-Peripheral   @ 14:07   No Growth Final  --  --          MEDICATIONS:    MEDICATIONS  (STANDING):  albuterol/ipratropium for Nebulization 3 milliLiter(s) Nebulizer every 6 hours  amLODIPine   Tablet 10 milliGRAM(s) Oral daily  aspirin  chewable 81 milliGRAM(s) Oral daily  atorvastatin 40 milliGRAM(s) Oral at bedtime  budesonide 160 MICROgram(s)/formoterol 4.5 MICROgram(s) Inhaler 2 Puff(s) Inhalation two times a day  epoetin beverley (PROCRIT) Injectable 97246 Unit(s) IV Push <User Schedule>  folic acid 1 milliGRAM(s) Oral daily  furosemide    Tablet 80 milliGRAM(s) Oral daily  heparin   Injectable 5000 Unit(s) SubCutaneous every 12 hours  hydrALAZINE 25 milliGRAM(s) Oral three times a day  latanoprost 0.005% Ophthalmic Solution 1 Drop(s) Both EYES at bedtime  levothyroxine 25 MICROGram(s) Oral daily  LORazepam     Tablet 0.5 milliGRAM(s) Oral <User Schedule>  metoclopramide 10 milliGRAM(s) Oral three times a day  metolazone 10 milliGRAM(s) Oral daily  metoprolol succinate ER 50 milliGRAM(s) Oral daily  oxycodone    5 mG/acetaminophen 325 mG 1 Tablet(s) Oral every 6 hours  pantoprazole    Tablet 40 milliGRAM(s) Oral before breakfast  sertraline 50 milliGRAM(s) Oral daily  sevelamer carbonate 800 milliGRAM(s) Oral three times a day  sucralfate 1 Gram(s) Oral four times a day      MEDICATIONS  (PRN):  haloperidol    Injectable 0.5 milliGRAM(s) IntraMuscular every 8 hours PRN Agitation  ondansetron   Disintegrating Tablet 4 milliGRAM(s) Oral every 6 hours PRN Nausea and/or Vomiting      REVIEW OF SYSTEMS:                           ALL ROS DONE [ X   ]    CONSTITUTIONAL:  LETHARGIC [   ], FEVER [   ], UNRESPONSIVE [   ]  CVS:  CP  [   ], SOB, [   ], PALPITATIONS [   ], DIZZYNESS [   ]  RS: COUGH [   ], SPUTUM [   ]  GI: ABDOMINAL PAIN [   ], NAUSEA [   ], VOMITINGS [   ], DIARRHEA [   ], CONSTIPATION [   ]  :  DYSURIA [   ], NOCTURIA [   ], INCREASED FREQUENCY [   ], DRIBLING [   ],  SKELETAL: PAINFUL JOINTS [   ], SWOLLEN JOINTS [   ], NECK ACHE [   ], LOW BACK ACHE [   ],  SKIN : ULCERS [   ], RASH [   ], ITCHING [   ]  CNS: HEAD ACHE [   ], DOUBLE VISION [   ], BLURRED VISION [   ], AMS / CONFUSION [   ], SEIZURES [   ], WEAKNESS [   ],TINGLING / NUMBNESS [   ]    PHYSICAL EXAMINATION:  GENERAL APPEARANCE: NO DISTRESS,    ANASARCA ++  HEENT:  NO PALLOR, NO  JVD,  NO   NODES, NECK SUPPLE  CVS: S1 +, S2 +,   RS: AEEB,  OCCASIONAL  RALES +,   CRACKLES+ AT LUNG BASES  ABD: SOFT, NT, NO, BS +  EXT: LEFT BKA  SKIN: WARM,   SKELETAL:  ROM ACCEPTABLE  CNS:  AAO X  3      RADIOLOGY :      ASSESSMENT :     Hypervolemia    No pertinent past medical history    Hypertension    Adrenal insufficiency    CKD (chronic kidney disease)    Anemia    Glaucoma    Coronary artery disease    HLD (hyperlipidemia)    Peripheral vascular disease    Spinal stenosis of lumbosacral region    Hyperparathyroidism    Diabetes mellitus    Diabetic neuropathy    Contracture of hand    Osteoarthritis    Osteoporosis    Vision loss of left eye    ESRD on hemodialysis    Cataract    BPH (benign prostatic hyperplasia)    UTI (urinary tract infection)    Bladder mass    H/O hematuria    Osteoporosis    Vision loss of right eye    Depression    Chronic GERD    Osteomyelitis of vertebra    CHF (congestive heart failure)    No significant past surgical history    Below knee amputation status, left    History of right cataract extraction    History of left cataract extraction    S/P arteriovenous (AV) fistula creation    H/O hematuria    H/O transurethral destruction of bladder lesion    History of excision of mass        PLAN:  HPI:  Patient is 62 year old male from [facility], walks with RW, with PMH of ESRD on HD (TTS), BKA- L w/prosthetic leg, DM, Left eye blindness, CHF, CAD, HTN, HLD, osteoporosis, bronchitis, current smoker, and anemia who presents with complaint of missed dialysis. Last HD . Pt states he often missed HD sessions.  patient states he missed this HD session due to mild pain in left leg, patient remains able to ambulate. Pt complains of right leg swelling and states he does not make any urine. Patient states he was having mild shortness of breath.  Pt denies any fever, chills, N/V/D, constipation, CP, numbness or tingling (2023 19:20)    # PATIENT W/ HISTORY OF ROUTINE NONCOMPLIANCE W/ LIFE-SUSTAINING TREATMENT [HD], EVALUATIONS AND INTERVENTIONS - COUNSELLED AT LENGTH TO REMAIN COMPLIANT. D/W PATIENT THAT PROGNOSIS IS GRIM/POOR. HE VERBALIZED UNDERSTANDING. REGARDING GOC - PATIENT WISHES FOR FULL CODE. PALLIATIVE CARE CONSULTED. PSYCHIATRY CONSULTED.  - PATIENT IS ORIENTED TO PERSON, PLACE AND CIRCUMSTANCE. HE EXPRESSED THAT HE DOES GROW IMPATIENT DURING DIALYSIS SESSIONS AND HAS BEEN LEAVING SESSIONS SOONER THAN PRESCRIBED. IN DISCUSSION THAT RISKS OF DEFERRING COMPLETE HD - INCLUDE BUT ARE NOT LIMITED TO WORSENING CLINICAL CONDITION, ELECTROLYTE ABNORMALITIES, ARRHYTHMIA AND DEATH. HE VERBALIZED UNDERSTANDING. HE REFUSES TO CONSIDER A NURSING HOME WITH ONSITE HD.   - D/W PATIENT THAT REPEATEDLY REFUSING INTERVENTIONS AND ASSESSMENTS IS NOT IN LINE WITH HIS WISHES TO IMPROVE. PATIENT VERBALIZED UNDERSTANDING AND AGREEMENT. IN DISCUSSION, REGARDING POSSIBLE ETIOLOGY - PSYCHIATRY WAS CONSULTED TO DISCUSS MOOD, PATIENT DOES EXPRESS THAT HE HAS SOME DEPRESSION GIVEN HIS MEDICAL CONDITIONS. BUT HE DENIES THAT THIS IS THE ETIOLOGY FOR NONCOMPLIANCE. HE EXPLAINS THAT HE DOES NOT LIKE BEING CONFINED TO A DIALYSIS MACHINE. OFFERED FOR PATIENT TO CONSIDER NURSING HOME W/ ONSITE HD. PATIENT WISHES TO CONTINUE TO LIVE IN ASSISTED LIVING.    # ACUTE ON CHRONIC HYPOXIC RESPIRATORY FAILURE S/T PULMONARY EDEMA - S/T INCOMPLETE HD SESSIONS ; ANASARCA  # HYPERKALEMIA  # HX OF COPD + S/P RECENT MULTIFOCAL PNEUMONIA  # UNCONTROLLED HTN  # ESRD ON HD TTS - W/ HX OF NONCOMPLIANCE    - TELEMETRY  - HYDRALAZINE, METOPROLOL AND NORVASC  - LOKELMA  - SUPPLEMENTAL O2  - PLANNED FOR HD  - NEPHROLOGY CONSULT    - PLANNED FOR HD ,   - REFUSED HD ON     # ELEVATED TROPONINS - ? DEMAND ISCHEMIA    - MONITOR ON TELEMETRY  - TREND TROPONINS  -  F/U ECHOCARDIOGRAM  - CARDIOLOGY CONSULT      # HX OF RECURRENT INTRACTABLE NAUSEA/VOMITING, GASTROPARESIS  # HISTORY OF ESOPHAGITIS AND DUODENITIS [2021], HX OF GASTROPARESIS W/ EVIDENCE OF THICKENED ESOPHAGUS ON PREVIOUS CT SCAN   # PANCREAS W/ DOUBLE DUCT SIGN    - DENIES RECENT HEMATEMESIS   - MONITORING HGB, PLACED ON PPI BID , CARAFATE TID  - CARAFATE, PRN ANTIEMETICS  - MONITORING FOR SYMPTOMS  - TOLERATING DIET ; PATIENT REPEATEDLY COUNSELLED TO CHEW FOOD CAUTIOUSLY AND CONSUME SMALL BITES W/ REGULAR CLEARING WITH WATER BETWEEN BITES.    - S/P RECENT PRBC TRANSFUSION     - RECENT GI CONSULT - RECOMMENDED OUTPATIENT F/U    # GASTROPARESIS  # UNDERLYING DM  - SSI + FS  - COUNSELLED TO COMPLY WITH HD TO REDUCE UREMIA    # DEPRESSION  - PLACED ON ZOLOFT  - PSYCHIATRY CONSULT    # PATIENT UNDERGOING OUTPATIENT WORKUP FOR CENTRAL VENOUS STENOSIS THROUGH NEPHROLOGY TEAM  - ? s/p STENT PLACEMENT    # ANEMIA OF CKD  # HX OF PANCYTOPENIA   - TREND HGB, TRANSFUSION THRESHOLD HGB < 7  - TYPE AND SCREEN  - ON EPO  - DENIES RECENT HEMATEMESIS, MELENA, HEMATOCHEZIA    - S/P RECENT PRBC TRANSFUSION    # SEVERE PROTEIN CALORIE MALNUTRITION, FAILURE TO THRIVE - NUTRITIONAL SUPPLEMENT    # HLD  # PARTIALLY BLIND  # S/P LEFT BKA  # HX OF PVD  # LS SPINAL STENOSIS  # GI AND DVT PPX .

## 2023-07-28 NOTE — PROGRESS NOTE ADULT - PROBLEM SELECTOR PLAN 4
Controlled in the community as evidenced by A1c 6.1  Takes 4U Lantus QHS at home  Frustrated with frequent interruptions and refusing care  Continue home Lantus regimen  Finger stick QHS  Check glucose with AM labs

## 2023-07-28 NOTE — PROGRESS NOTE ADULT - SUBJECTIVE AND OBJECTIVE BOX
HPI:  Patient is 62 year old male from [facility], walks with RW, with PMH of ESRD on HD (TTS), BKA- L w/prosthetic leg, DM, Left eye blindness, CHF, CAD, HTN, HLD, osteoporosis, bronchitis, current smoker, and anemia who presents with complaint of missed dialysis. Last HD 7/22. Pt states he often missed HD sessions.  patient states he missed this HD session due to mild pain in left leg, patient remains able to ambulate. Pt complains of right leg swelling and states he does not make any urine. Patient states he was having mild shortness of breath.  Pt denies any fever, chills, N/V/D, constipation, CP, numbness or tingling (26 Jul 2023 19:20)      OVERNIGHT EVENTS:    No new overnight events.  Seen and examined at bedside.     REVIEW OF SYSTEMS:      CONSTITUTIONAL: No fever  EYES: no acute visual disturbances  NECK: No pain or stiffness  RESPIRATORY: No cough; No shortness of breath  CARDIOVASCULAR: No chest pain, no palpitations  GASTROINTESTINAL: No pain. No nausea, vomiting or diarrhea   NEUROLOGICAL: No headache or numbness, no tremors  MUSCULOSKELETAL: No joint pain, no muscle pain  GENITOURINARY: no dysuria, no frequency, no hesitancy  PSYCHIATRY: no depression, no anxiety  ALL OTHER  ROS negative        Vital Signs Last 24 Hrs  T(C): 37.2 (28 Jul 2023 11:14), Max: 37.2 (27 Jul 2023 21:15)  T(F): 99 (28 Jul 2023 11:14), Max: 99 (27 Jul 2023 21:15)  HR: 96 (28 Jul 2023 11:14) (81 - 96)  BP: 142/76 (28 Jul 2023 11:14) (131/76 - 164/85)  BP(mean): --  RR: 20 (28 Jul 2023 11:14) (18 - 20)  SpO2: 96% (28 Jul 2023 11:14) (86% - 96%)    Parameters below as of 28 Jul 2023 11:14  Patient On (Oxygen Delivery Method): nasal cannula  O2 Flow (L/min): 2      ________________________________________________  PHYSICAL EXAM:    GENERAL: NAD  HEENT: Normocephalic; conjunctivae and sclerae clear;  NECK : supple, no JVD  CHEST/LUNG: Clear to auscultation; Nonlabored  HEART: S1 S2  regular  ABDOMEN: Soft, Nontender, Nondistended; Bowel sounds present  EXTREMITIES: no cyanosis; no LE edema; no calf tenderness  NERVOUS SYSTEM:  Alert; no new deficits  SKIN: warm and dry; No new rashes or lesions    _________________________________________________  CURRENT MEDICATIONS:    MEDICATIONS  (STANDING):  albuterol/ipratropium for Nebulization 3 milliLiter(s) Nebulizer every 6 hours  amLODIPine   Tablet 10 milliGRAM(s) Oral daily  aspirin  chewable 81 milliGRAM(s) Oral daily  atorvastatin 40 milliGRAM(s) Oral at bedtime  budesonide 160 MICROgram(s)/formoterol 4.5 MICROgram(s) Inhaler 2 Puff(s) Inhalation two times a day  epoetin beverley (PROCRIT) Injectable 56558 Unit(s) IV Push <User Schedule>  folic acid 1 milliGRAM(s) Oral daily  furosemide    Tablet 80 milliGRAM(s) Oral daily  heparin   Injectable 5000 Unit(s) SubCutaneous every 12 hours  hydrALAZINE 25 milliGRAM(s) Oral three times a day  latanoprost 0.005% Ophthalmic Solution 1 Drop(s) Both EYES at bedtime  levothyroxine 25 MICROGram(s) Oral daily  LORazepam     Tablet 0.5 milliGRAM(s) Oral <User Schedule>  metoclopramide 10 milliGRAM(s) Oral three times a day  metolazone 10 milliGRAM(s) Oral daily  metoprolol succinate ER 50 milliGRAM(s) Oral daily  oxycodone    5 mG/acetaminophen 325 mG 1 Tablet(s) Oral every 6 hours  pantoprazole    Tablet 40 milliGRAM(s) Oral before breakfast  sertraline 50 milliGRAM(s) Oral daily  sevelamer carbonate 800 milliGRAM(s) Oral three times a day  sucralfate 1 Gram(s) Oral four times a day    MEDICATIONS  (PRN):  haloperidol    Injectable 0.5 milliGRAM(s) IntraMuscular every 8 hours PRN Agitation  ondansetron   Disintegrating Tablet 4 milliGRAM(s) Oral every 6 hours PRN Nausea and/or Vomiting      __________________________________________________  LABS:                          7.0    3.47  )-----------( 62       ( 28 Jul 2023 10:35 )             22.4     07-28    139  |  101  |  106<H>  ----------------------------<  130<H>  5.4<H>   |  22  |  15.60<H>    Ca    8.0<L>      28 Jul 2023 10:35  Phos  5.7     07-28        Urinalysis Basic - ( 28 Jul 2023 10:35 )    Color: x / Appearance: x / SG: x / pH: x  Gluc: 130 mg/dL / Ketone: x  / Bili: x / Urobili: x   Blood: x / Protein: x / Nitrite: x   Leuk Esterase: x / RBC: x / WBC x   Sq Epi: x / Non Sq Epi: x / Bacteria: x      CAPILLARY BLOOD GLUCOSE          __________________________________________________  RADIOLOGY & ADDITIONAL TESTS:    Imaging Personally Reviewed:  YES    < from: CT Abdomen and Pelvis No Cont (07.26.23 @ 18:09) >  IMPRESSION:  Mild pulmonary edema and marked anasarca.  A dilated proximal pancreatic duct and mildly dilated biliary tree are   without significant change.    < end of copied text >    Consultant(s) Notes Reviewed:   YES     Plan of care was discussed with patient and /or primary care giver; all questions and concerns were addressed and care was aligned with patient's wishes.    Plan discussed with attending and consulting physicians.

## 2023-07-28 NOTE — CONSULT NOTE ADULT - PROBLEM SELECTOR RECOMMENDATION 9
-recently noncompliant with HD sessions, presented fluid overloaded  -agreed for 1 hr HD yesterday  -see GOC, pt with clear wishes to continue with HD and much encouragement to addend sessions as prescribed

## 2023-07-28 NOTE — PROGRESS NOTE ADULT - ASSESSMENT
Patient is 62 year old male from [facility], walks with RW, with PMH of ESRD on HD (TTS), BKA- L w/prosthetic leg, DM, Left eye blindness, CHF, CAD, HTN, HLD, osteoporosis, bronchitis, current smoker, and anemia who presents with complaint of missed dialysis. Last HD 7/22. Pt states he often missed HD sessions.   Pt complains of right leg swelling and states he does not make any urine. Patient states he was having mild shortness of breath. Patient admitted to tele for missed dialysis found to have pulmonary edema and BNP [id number]. Cardiology and nephro consulted. Patient undergo 1h dialysis session yesterday and refused to complete full hd. Patient also require cardiac monitoring due to severe CHF and fluid overload but refusing to wear tele monitor. Patient was made aware of importance of dialysis, blood drawing and other testing as well medication regiment. Patient was informed if he continue to refuse treatment his condition may, worsen including death. patient verbalized understanding and still refused to wear cardiac monitoring and daily phlebotomy.

## 2023-07-28 NOTE — PROGRESS NOTE ADULT - PROBLEM SELECTOR PLAN 2
In the setting of ESRD  Continue Shahrzad Mosher, Nephrology, following  Recommendations appreciated

## 2023-07-28 NOTE — PROGRESS NOTE ADULT - PROBLEM SELECTOR PLAN 1
CT CAP noted above  1.5H in HD yesterday  Refused HD today  K 5.4, refusing TELE  Renal diet  Dr. Mosher, Nephrology, following  Recommendations appreciated

## 2023-07-28 NOTE — CONSULT NOTE ADULT - SUBJECTIVE AND OBJECTIVE BOX
C A R D I O L O G Y  *********************    DATE OF SERVICE: 07-28-23    HISTORY OF PRESENT ILLNESS: HPI:  Patient is 62 year old male from [facility], walks with RW, with PMH of ESRD on HD (TTS), BKA- L w/prosthetic leg, DM, Left eye blindness, CHF,, HTN, HLD, EF 45-50%, normal perfusion 4/23 osteoporosis, bronchitis, current smoker, and anemia who presents with complaint of missed dialysis. Last HD 7/22. Pt states he often missed HD sessions.  patient states he missed this HD session due to mild pain in left leg, patient remains able to ambulate. Pt complains of right leg swelling and states he does not make any urine. Patient states he was having mild shortness of breath.  Pt denies any fever, chills, N/V/D, constipation, CP, numbness or tingling (26 Jul 2023 19:20)       PAST MEDICAL & SURGICAL HISTORY:  Hypertension  Adrenal insufficiency  h/o  Anemia  Glaucoma  Coronary artery disease  HLD (hyperlipidemia)  Peripheral vascular disease  Spinal stenosis of lumbosacral region  Hyperparathyroidism  Diabetes mellitus  Diabetic neuropathy  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  Osteoarthritis  Vision loss of left eye  blind  ESRD on hemodialysis  T/Th/S  Cataract  both eyes - hx of sx done  BPH (benign prostatic hyperplasia)  UTI (urinary tract infection)  hx of  Bladder mass  hx of  H/O hematuria  Osteoporosis  Vision loss of right eye  decreased  Depression  Chronic GERD  Osteomyelitis of vertebra  CHF (congestive heart failure)  Below knee amputation status, left  2012- pt is wearing prostesis  History of right cataract extraction  History of left cataract extraction  S/P arteriovenous (AV) fistula creation  right arm brachiocephalic arteriovenous fistula on 11/08/2018  H/O hematuria  s/p bladder bx and fulguration 2/25/2020  H/O transurethral destruction of bladder lesion  2020  History of excision of mass  back mass on 03/31/2021              MEDICATIONS:  MEDICATIONS  (STANDING):  albuterol/ipratropium for Nebulization 3 milliLiter(s) Nebulizer every 6 hours  amLODIPine   Tablet 10 milliGRAM(s) Oral daily  aspirin  chewable 81 milliGRAM(s) Oral daily  atorvastatin 40 milliGRAM(s) Oral at bedtime  budesonide 160 MICROgram(s)/formoterol 4.5 MICROgram(s) Inhaler 2 Puff(s) Inhalation two times a day  epoetin beverley (PROCRIT) Injectable 72936 Unit(s) IV Push <User Schedule>  folic acid 1 milliGRAM(s) Oral daily  furosemide    Tablet 80 milliGRAM(s) Oral daily  heparin   Injectable 5000 Unit(s) SubCutaneous every 12 hours  hydrALAZINE 25 milliGRAM(s) Oral three times a day  latanoprost 0.005% Ophthalmic Solution 1 Drop(s) Both EYES at bedtime  levothyroxine 25 MICROGram(s) Oral daily  LORazepam     Tablet 0.5 milliGRAM(s) Oral <User Schedule>  metoclopramide 10 milliGRAM(s) Oral three times a day  metolazone 10 milliGRAM(s) Oral daily  metoprolol succinate ER 50 milliGRAM(s) Oral daily  oxycodone    5 mG/acetaminophen 325 mG 1 Tablet(s) Oral every 6 hours  pantoprazole    Tablet 40 milliGRAM(s) Oral before breakfast  sertraline 50 milliGRAM(s) Oral daily  sevelamer carbonate 800 milliGRAM(s) Oral three times a day  sucralfate 1 Gram(s) Oral four times a day      Allergies    No Known Drug Allergies  fish (Rash)  liver (Anaphylaxis)    Intolerances        FAMILY HISTORY:  Family history of cirrhosis of liver (Mother)    Family history of renal failure (Sibling)    Family history of hypertension (Sibling)    Family history of diabetes mellitus (Sibling)    History of substance abuse in sibling (Sibling)      Non-contributary for premature coronary disease or sudden cardiac death    SOCIAL HISTORY:    [ ] Non-smoker  [ X] Smoker  [ ] Alcohol    FLU VACCINE THIS YEAR STARTS IN AUGUST:  [ ] Yes    [ ] No    IF OVER 65 HAVE YOU EVER HAD A PNA VACCINE:  [ ] Yes    [ ] No       [ ] N/A      REVIEW OF SYSTEMS:  [ ]chest pain  [  ]shortness of breath  [  ]palpitations  [  ]syncope  [ ]near syncope [ ]upper extremity weakness   [ ] lower extremity weakness  [  ]diplopia  [  ]altered mental status   [  ]fevers  [ ]chills [ ]nausea  [ ]vomitting  [  ]dysphagia    [ ]abdominal pain  [ ]melena  [ ]BRBPR    [  ]epistaxis  [  ]rash    [ ]lower extremity edema        [X] All others negative	  [ ] Unable to obtain      LABS:	 	    CARDIAC MARKERS:        Troponin I, High Sensitivity Result: 88.5 ng/L (07-26-23 @ 14:10)                            7.2    2.57  )-----------( 73       ( 26 Jul 2023 14:10 )             22.6     Hb Trend:     07-26    132<L>  |  99  |  102<H>  ----------------------------<  103<H>  5.3   |  22  |  15.80<H>    Ca    8.1<L>      26 Jul 2023 14:10  Phos  5.3     07-26  Mg     3.3     07-26    TPro  6.5  /  Alb  2.7<L>  /  TBili  0.7  /  DBili  x   /  AST  25  /  ALT  42  /  AlkPhos  89  07-26    Creatinine Trend: 15.80<--, 17.20<--, 15.90<--, 10.60<--, 10.80<--        PHYSICAL EXAM:  T(C): 36.9 (07-28-23 @ 07:53), Max: 37.2 (07-27-23 @ 21:15)  HR: 86 (07-28-23 @ 07:53) (81 - 88)  BP: 156/96 (07-28-23 @ 07:53) (131/76 - 164/85)  RR: 19 (07-28-23 @ 07:53) (18 - 20)  SpO2: 94% (07-28-23 @ 07:53) (86% - 96%)  Wt(kg): --   BMI (kg/m2): 29.1 (07-26-23 @ 11:05)  I&O's Summary    27 Jul 2023 07:01  -  28 Jul 2023 07:00  --------------------------------------------------------  IN: 400 mL / OUT: 1997 mL / NET: -1597 mL        Gen: Appears well in NAD  HEENT:  (-)icterus (-)pallor  CV: N S1 S2 1/6 DIANA (+)2 Pulses B/l  Resp:  Clear to ausculatation B/L, normal effort  GI: (+) BS Soft, NT, ND  Lymph:  (-)Edema, (-)obvious lymphadenopathy  Skin: Warm to touch, Normal turgor  Psych: Appropriate mood and affect       ECG:  	sinus 1degree AV block    RADIOLOGY:         CXR:    < from: Xray Chest 1 View- PORTABLE-Urgent (07.26.23 @ 13:00) >  1. Discoid atelectasis in the right upper lobe adjacent to the minor   fissure versus pleural fluid within the fissure itself. This is not   present previously.  2. Cardiomegaly without pulmonary edema.  3. Right sided subclavian vascular stent along with apparent coils within   the soft tissues of the medial right upper extremity, unchanged.    < end of copied text >    ASSESSMENT/PLAN: 	62y Male  Mateus Murray, walks with RW, with PMH of ESRD on HD (TTS), BKA- L w/prosthetic leg, DM, Left eye blindness, CHF,, HTN, HLD, EF 45-50%, normal perfusion 4/23 osteoporosis, bronchitis, current smoker, and anemia who presents with complaint of missed dialysis.     # CHF  - Due to missed HD  - HD per renal  - check echo for interval changes    # Positive trop  - nothing to suggest ACS.  His trop has been higher in the past and demonstrated normal perfusion on Stress 4/23    # Noncompliance  - Behavioral health f/u    # Smoking  - Cessation stressed      # HTN  - Suspect BP will improve once euvolemic     I once again thank you for allowing me to participate in the care of your patient.  If you have any questions or concerns please do not hesitate to contact me.    Dominic Still MD, [facility]  BEEPER [phone]

## 2023-07-28 NOTE — PROGRESS NOTE ADULT - ASSESSMENT
# ESRD. he is grossly fluid overloaded. He only agreed to 1.5 hr of HD yesterday.offered HD again today, but declined. HD in AM   Encouraged to stay for the full treatment time   noted, psychiatry service [id number]  # anemia of CKD. epogen on HD. transfuse for HBG <7  # RENAL OSTEODYSTROPHY .cont  SEVELAMER  # HTN. cont current medications . UF at HD

## 2023-07-28 NOTE — PROGRESS NOTE ADULT - PROBLEM SELECTOR PLAN 6
In the setting of HD  Hgb 7.0, consider transfusion with next HD session  Continue  Epogen with dialysis  Continue Iron tabs

## 2023-07-28 NOTE — CONSULT NOTE ADULT - PROBLEM SELECTOR RECOMMENDATION 3
-decline in functional status in setting of fluid overload and metabolic encephalopathy due to missed HD sessions  -pt aware that he may not be a candidate for assisted if continues to decline functinally and may need LTC placement if unable to tolerate ambulation and perform ADLs independently  -presently he is limited by severe weakness and fatigue in the setting of missed HD sessions  -recommend PT eval -decline in functional status in setting of fluid overload and metabolic encephalopathy due to missed HD sessions  -pt aware that he may not be a candidate for care home if continues to decline functionally and may need LTC placement if unable to tolerate ambulation and perform ADLs independently  -presently he is limited by severe weakness and fatigue in the setting of missed HD sessions  -recommend PT eval

## 2023-07-28 NOTE — PROGRESS NOTE ADULT - SUBJECTIVE AND OBJECTIVE BOX
[facility] : Progress Note :: [phone], (office [phone]),   Dr Mosher / Dr Washington / Dr Young / Dr Doll / Dr Varsha TURCIOS / Dr Tello / Dr Garcia / Dr Larry qiu  _____________________________________________________________________________________________  only stayed for 1.5 hrs in HD yesterday.  offered HD today but declined    No Known Drug Allergies  fish (Rash)  liver (Anaphylaxis)    Hospital Medications:   MEDICATIONS  (STANDING):  albuterol/ipratropium for Nebulization 3 milliLiter(s) Nebulizer every 6 hours  amLODIPine   Tablet 10 milliGRAM(s) Oral daily  aspirin  chewable 81 milliGRAM(s) Oral daily  atorvastatin 40 milliGRAM(s) Oral at bedtime  budesonide 160 MICROgram(s)/formoterol 4.5 MICROgram(s) Inhaler 2 Puff(s) Inhalation two times a day  epoetin beverley (PROCRIT) Injectable 77897 Unit(s) IV Push <User Schedule>  folic acid 1 milliGRAM(s) Oral daily  furosemide    Tablet 80 milliGRAM(s) Oral daily  heparin   Injectable 5000 Unit(s) SubCutaneous every 12 hours  hydrALAZINE 25 milliGRAM(s) Oral three times a day  latanoprost 0.005% Ophthalmic Solution 1 Drop(s) Both EYES at bedtime  levothyroxine 25 MICROGram(s) Oral daily  LORazepam     Tablet 0.5 milliGRAM(s) Oral <User Schedule>  metoclopramide 10 milliGRAM(s) Oral three times a day  metolazone 10 milliGRAM(s) Oral daily  metoprolol succinate ER 50 milliGRAM(s) Oral daily  oxycodone    5 mG/acetaminophen 325 mG 1 Tablet(s) Oral every 6 hours  pantoprazole    Tablet 40 milliGRAM(s) Oral before breakfast  sertraline 50 milliGRAM(s) Oral daily  sevelamer carbonate 800 milliGRAM(s) Oral three times a day  sucralfate 1 Gram(s) Oral four times a day      VITALS:  T(F): 98.4 (07-28-23 @ 07:53), Max: 99 (07-27-23 @ 21:15)  HR: 86 (07-28-23 @ 07:53)  BP: 156/96 (07-28-23 @ 07:53)  RR: 19 (07-28-23 @ 07:53)  SpO2: 94% (07-28-23 @ 07:53)  Wt(kg): --    07-27 @ 07:01  -  07-28 @ 07:00  --------------------------------------------------------  IN: 400 mL / OUT: 1997 mL / NET: -1597 mL        PHYSICAL EXAM:  Constitutional: NAD  HEENT: facial edema.  Neck: No JVD  Respiratory: CTAB, no wheezes, rales or rhonchi  Cardiovascular: S1, S2, RRR  Gastrointestinal: BS+, soft, NT/ND  Extremities:  peripheral edema++  Neurological: A/O x 3, no focal deficits  Vascular Access: AVF with thrill    LABS:  07-26    132<L>  |  99  |  102<H>  ----------------------------<  103<H>  5.3   |  22  |  15.80<H>    Ca    8.1<L>      26 Jul 2023 14:10  Phos  5.3     07-26  Mg     3.3     07-26    TPro  6.5  /  Alb  2.7<L>  /  TBili  0.7  /  DBili      /  AST  25  /  ALT  42  /  AlkPhos  89  07-26    Creatinine Trend: 15.80 <--                        7.2    2.57  )-----------( 73       ( 26 Jul 2023 14:10 )             22.6     Urine Studies:  Urinalysis Basic - ( 26 Jul 2023 14:10 )    Color:  / Appearance:  / SG:  / pH:   Gluc: 103 mg/dL / Ketone:   / Bili:  / Urobili:    Blood:  / Protein:  / Nitrite:    Leuk Esterase:  / RBC:  / WBC    Sq Epi:  / Non Sq Epi:  / Bacteria:         RADIOLOGY & ADDITIONAL STUDIES:

## 2023-07-29 NOTE — PROGRESS NOTE ADULT - SUBJECTIVE AND OBJECTIVE BOX
Patient is a 62y old  Male who presents with a chief complaint of Missed dialysis (29 Jul 2023 09:52)    PATIENT IS SEEN AND EXAMINED IN MEDICAL FLOOR.  MARIIT [    ]    JEREMY [   ]      GT [   ]    ALLERGIES:  No Known Drug Allergies  fish (Rash)  liver (Anaphylaxis)      Daily     Daily     VITALS:    Vital Signs Last 24 Hrs  T(C): 36.9 (29 Jul 2023 07:36), Max: 37.2 (28 Jul 2023 11:14)  T(F): 98.4 (29 Jul 2023 07:36), Max: 99 (28 Jul 2023 11:14)  HR: 84 (29 Jul 2023 07:36) (78 - 96)  BP: 155/88 (29 Jul 2023 07:36) (142/76 - 157/91)  BP(mean): --  RR: 19 (29 Jul 2023 07:36) (17 - 20)  SpO2: 92% (29 Jul 2023 07:36) (85% - 96%)    Parameters below as of 29 Jul 2023 07:36  Patient On (Oxygen Delivery Method): room air        LABS:    CBC Full  -  ( 28 Jul 2023 10:35 )  WBC Count : 3.47 K/uL  RBC Count : 2.61 M/uL  Hemoglobin : 7.0 g/dL  Hematocrit : 22.4 %  Platelet Count - Automated : 62 K/uL  Mean Cell Volume : 85.8 fl  Mean Cell Hemoglobin : 26.8 pg  Mean Cell Hemoglobin Concentration : 31.3 gm/dL  Auto Neutrophil # : 2.99 K/uL  Auto Lymphocyte # : 0.28 K/uL  Auto Monocyte # : 0.13 K/uL  Auto Eosinophil # : 0.06 K/uL  Auto Basophil # : 0.00 K/uL  Auto Neutrophil % : 86.2 %  Auto Lymphocyte % : 8.1 %  Auto Monocyte % : 3.7 %  Auto Eosinophil % : 1.7 %  Auto Basophil % : 0.0 %      07-28    139  |  101  |  106<H>  ----------------------------<  130<H>  5.4<H>   |  22  |  15.60<H>    Ca    8.0<L>      28 Jul 2023 10:35  Phos  5.7     07-28      CAPILLARY BLOOD GLUCOSE      POCT Blood Glucose.: 117 mg/dL (29 Jul 2023 07:46)  POCT Blood Glucose.: 153 mg/dL (28 Jul 2023 21:13)  POCT Blood Glucose.: 169 mg/dL (28 Jul 2023 17:02)          Creatinine Trend: 15.60<--, 15.80<--, 17.20<--, 15.90<--, 10.60<--, 10.80<--  I&O's Summary          .Blood Blood-Peripheral  05-28 @ 14:07   No Growth Final  --  --          MEDICATIONS:    MEDICATIONS  (STANDING):  albuterol/ipratropium for Nebulization 3 milliLiter(s) Nebulizer every 6 hours  amLODIPine   Tablet 10 milliGRAM(s) Oral daily  aspirin  chewable 81 milliGRAM(s) Oral daily  atorvastatin 40 milliGRAM(s) Oral at bedtime  budesonide 160 MICROgram(s)/formoterol 4.5 MICROgram(s) Inhaler 2 Puff(s) Inhalation two times a day  dextrose 5%. 1000 milliLiter(s) (50 mL/Hr) IV Continuous <Continuous>  dextrose 5%. 1000 milliLiter(s) (100 mL/Hr) IV Continuous <Continuous>  dextrose 50% Injectable 25 Gram(s) IV Push once  dextrose 50% Injectable 12.5 Gram(s) IV Push once  dextrose 50% Injectable 25 Gram(s) IV Push once  epoetin beverley (PROCRIT) Injectable 27813 Unit(s) IV Push <User Schedule>  folic acid 1 milliGRAM(s) Oral daily  furosemide    Tablet 80 milliGRAM(s) Oral daily  glucagon  Injectable 1 milliGRAM(s) IntraMuscular once  heparin   Injectable 5000 Unit(s) SubCutaneous every 12 hours  hydrALAZINE 25 milliGRAM(s) Oral three times a day  insulin glargine Injectable (LANTUS) 4 Unit(s) SubCutaneous at bedtime  latanoprost 0.005% Ophthalmic Solution 1 Drop(s) Both EYES at bedtime  levothyroxine 25 MICROGram(s) Oral daily  LORazepam     Tablet 0.5 milliGRAM(s) Oral <User Schedule>  metoclopramide 10 milliGRAM(s) Oral three times a day  metolazone 10 milliGRAM(s) Oral daily  metoprolol succinate ER 50 milliGRAM(s) Oral daily  oxycodone    5 mG/acetaminophen 325 mG 1 Tablet(s) Oral every 6 hours  pantoprazole    Tablet 40 milliGRAM(s) Oral before breakfast  sertraline 50 milliGRAM(s) Oral daily  sevelamer carbonate 800 milliGRAM(s) Oral three times a day  sucralfate 1 Gram(s) Oral four times a day      MEDICATIONS  (PRN):  dextrose Oral Gel 15 Gram(s) Oral once PRN Blood Glucose LESS THAN 70 milliGRAM(s)/deciliter  haloperidol    Injectable 0.5 milliGRAM(s) IntraMuscular every 8 hours PRN Agitation  ondansetron   Disintegrating Tablet 4 milliGRAM(s) Oral every 6 hours PRN Nausea and/or Vomiting      REVIEW OF SYSTEMS:                           ALL ROS DONE [ X   ]    CONSTITUTIONAL:  LETHARGIC [   ], FEVER [   ], UNRESPONSIVE [   ]  CVS:  CP  [   ], SOB, [   ], PALPITATIONS [   ], DIZZYNESS [   ]  RS: COUGH [   ], SPUTUM [   ]  GI: ABDOMINAL PAIN [   ], NAUSEA [   ], VOMITINGS [   ], DIARRHEA [   ], CONSTIPATION [   ]  :  DYSURIA [   ], NOCTURIA [   ], INCREASED FREQUENCY [   ], DRIBLING [   ],  SKELETAL: PAINFUL JOINTS [   ], SWOLLEN JOINTS [   ], NECK ACHE [   ], LOW BACK ACHE [   ],  SKIN : ULCERS [   ], RASH [   ], ITCHING [   ]  CNS: HEAD ACHE [   ], DOUBLE VISION [   ], BLURRED VISION [   ], AMS / CONFUSION [   ], SEIZURES [   ], WEAKNESS [   ],TINGLING / NUMBNESS [   ]    PHYSICAL EXAMINATION:  GENERAL APPEARANCE: NO DISTRESS,    ANASARCA ++  HEENT:  NO PALLOR, NO  JVD,  NO   NODES, NECK SUPPLE  CVS: S1 +, S2 +,   RS: AEEB,  OCCASIONAL  RALES +,   CRACKLES+ AT LUNG BASES  ABD: SOFT, NT, NO, BS +  EXT: LEFT BKA  SKIN: WARM,   SKELETAL:  ROM ACCEPTABLE  CNS:  AAO X  3      RADIOLOGY :      ASSESSMENT :     Hypervolemia    No pertinent past medical history    Hypertension    Adrenal insufficiency    CKD (chronic kidney disease)    Anemia    Glaucoma    Coronary artery disease    HLD (hyperlipidemia)    Peripheral vascular disease    Spinal stenosis of lumbosacral region    Hyperparathyroidism    Diabetes mellitus    Diabetic neuropathy    Contracture of hand    Osteoarthritis    Osteoporosis    Vision loss of left eye    ESRD on hemodialysis    Cataract    BPH (benign prostatic hyperplasia)    UTI (urinary tract infection)    Bladder mass    H/O hematuria    Osteoporosis    Vision loss of right eye    Depression    Chronic GERD    Osteomyelitis of vertebra    CHF (congestive heart failure)    No significant past surgical history    Below knee amputation status, left    History of right cataract extraction    History of left cataract extraction    S/P arteriovenous (AV) fistula creation    H/O hematuria    H/O transurethral destruction of bladder lesion    History of excision of mass        PLAN:  HPI:  Patient is 62 year old male from [facility], walks with RW, with PMH of ESRD on HD (TTS), BKA- L w/prosthetic leg, DM, Left eye blindness, CHF, CAD, HTN, HLD, osteoporosis, bronchitis, current smoker, and anemia who presents with complaint of missed dialysis. Last HD 7/22. Pt states he often missed HD sessions.  patient states he missed this HD session due to mild pain in left leg, patient remains able to ambulate. Pt complains of right leg swelling and states he does not make any urine. Patient states he was having mild shortness of breath.  Pt denies any fever, chills, N/V/D, constipation, CP, numbness or tingling (26 Jul 2023 19:20)    # PATIENT W/ HISTORY OF ROUTINE NONCOMPLIANCE W/ LIFE-SUSTAINING TREATMENT [HD], EVALUATIONS AND INTERVENTIONS - COUNSELLED AT LENGTH TO REMAIN COMPLIANT. D/W PATIENT THAT PROGNOSIS IS GRIM/POOR. HE VERBALIZED UNDERSTANDING. REGARDING GOC - PATIENT WISHES FOR FULL CODE. PALLIATIVE CARE CONSULTED. PSYCHIATRY CONSULTED.  - PATIENT IS ORIENTED TO PERSON, PLACE AND CIRCUMSTANCE. HE EXPRESSED THAT HE DOES GROW IMPATIENT DURING DIALYSIS SESSIONS AND HAS BEEN LEAVING SESSIONS SOONER THAN PRESCRIBED. IN DISCUSSION THAT RISKS OF DEFERRING COMPLETE HD - INCLUDE BUT ARE NOT LIMITED TO WORSENING CLINICAL CONDITION, ELECTROLYTE ABNORMALITIES, ARRHYTHMIA AND DEATH. HE VERBALIZED UNDERSTANDING. HE REFUSES TO CONSIDER A NURSING HOME WITH ONSITE HD.   - D/W PATIENT THAT REPEATEDLY REFUSING INTERVENTIONS AND ASSESSMENTS IS NOT IN LINE WITH HIS WISHES TO IMPROVE. PATIENT VERBALIZED UNDERSTANDING AND AGREEMENT. IN DISCUSSION, REGARDING POSSIBLE ETIOLOGY - PSYCHIATRY WAS CONSULTED TO DISCUSS MOOD, PATIENT DOES EXPRESS THAT HE HAS SOME DEPRESSION GIVEN HIS MEDICAL CONDITIONS. BUT HE DENIES THAT THIS IS THE ETIOLOGY FOR NONCOMPLIANCE. HE EXPLAINS THAT HE DOES NOT LIKE BEING CONFINED TO A DIALYSIS MACHINE. OFFERED FOR PATIENT TO CONSIDER NURSING HOME W/ ONSITE HD. PATIENT WISHES TO CONTINUE TO LIVE IN ASSISTED LIVING.    # ACUTE ON CHRONIC HYPOXIC RESPIRATORY FAILURE S/T PULMONARY EDEMA - S/T INCOMPLETE HD SESSIONS ; ANASARCA  # HYPERKALEMIA  # HX OF COPD + S/P RECENT MULTIFOCAL PNEUMONIA  # UNCONTROLLED HTN  # ESRD ON HD TTS - W/ HX OF NONCOMPLIANCE    - TELEMETRY  - HYDRALAZINE, METOPROLOL AND NORVASC  - LOKELMA  - SUPPLEMENTAL O2  - PLANNED FOR HD  - NEPHROLOGY CONSULT    - PLANNED FOR HD 7/26, 7/27  - REFUSED HD ON 7/28    # ELEVATED TROPONINS - ? DEMAND ISCHEMIA    - MONITOR ON TELEMETRY  - TREND TROPONINS  -  F/U ECHOCARDIOGRAM  - CARDIOLOGY CONSULT      # HX OF RECURRENT INTRACTABLE NAUSEA/VOMITING, GASTROPARESIS  # HISTORY OF ESOPHAGITIS AND DUODENITIS [1/2021], HX OF GASTROPARESIS W/ EVIDENCE OF THICKENED ESOPHAGUS ON PREVIOUS CT SCAN   # PANCREAS W/ DOUBLE DUCT SIGN    - DENIES RECENT HEMATEMESIS   - MONITORING HGB, PLACED ON PPI BID , CARAFATE TID  - CARAFATE, PRN ANTIEMETICS  - MONITORING FOR SYMPTOMS  - TOLERATING DIET ; PATIENT REPEATEDLY COUNSELLED TO CHEW FOOD CAUTIOUSLY AND CONSUME SMALL BITES W/ REGULAR CLEARING WITH WATER BETWEEN BITES.    - S/P RECENT PRBC TRANSFUSION     - RECENT GI CONSULT - RECOMMENDED OUTPATIENT F/U    # GASTROPARESIS  # UNDERLYING DM  - SSI + FS  - COUNSELLED TO COMPLY WITH HD TO REDUCE UREMIA    # DEPRESSION  - PLACED ON ZOLOFT  - PSYCHIATRY CONSULT    # PATIENT UNDERGOING OUTPATIENT WORKUP FOR CENTRAL VENOUS STENOSIS THROUGH NEPHROLOGY TEAM  - ? s/p STENT PLACEMENT    # ANEMIA OF CKD  # HX OF PANCYTOPENIA   - TREND HGB, TRANSFUSION THRESHOLD HGB < 7  - TYPE AND SCREEN  - ON EPO  - DENIES RECENT HEMATEMESIS, MELENA, HEMATOCHEZIA    - S/P RECENT PRBC TRANSFUSION    # SEVERE PROTEIN CALORIE MALNUTRITION, FAILURE TO THRIVE - NUTRITIONAL SUPPLEMENT    # HLD  # PARTIALLY BLIND  # S/P LEFT BKA  # HX OF PVD  # LS SPINAL STENOSIS  # GI AND DVT PPX .   Patient is a 62y old  Male who presents with a chief complaint of Missed dialysis (29 Jul 2023 09:52)    PATIENT IS SEEN AND EXAMINED IN MEDICAL FLOOR.      ALLERGIES:  No Known Drug Allergies  fish (Rash)  liver (Anaphylaxis)      VITALS:    Vital Signs Last 24 Hrs  T(C): 36.9 (29 Jul 2023 07:36), Max: 37.2 (28 Jul 2023 11:14)  T(F): 98.4 (29 Jul 2023 07:36), Max: 99 (28 Jul 2023 11:14)  HR: 84 (29 Jul 2023 07:36) (78 - 96)  BP: 155/88 (29 Jul 2023 07:36) (142/76 - 157/91)  BP(mean): --  RR: 19 (29 Jul 2023 07:36) (17 - 20)  SpO2: 92% (29 Jul 2023 07:36) (85% - 96%)    Parameters below as of 29 Jul 2023 07:36  Patient On (Oxygen Delivery Method): room air        LABS:    CBC Full  -  ( 28 Jul 2023 10:35 )  WBC Count : 3.47 K/uL  RBC Count : 2.61 M/uL  Hemoglobin : 7.0 g/dL  Hematocrit : 22.4 %  Platelet Count - Automated : 62 K/uL  Mean Cell Volume : 85.8 fl  Mean Cell Hemoglobin : 26.8 pg  Mean Cell Hemoglobin Concentration : 31.3 gm/dL  Auto Neutrophil # : 2.99 K/uL  Auto Lymphocyte # : 0.28 K/uL  Auto Monocyte # : 0.13 K/uL  Auto Eosinophil # : 0.06 K/uL  Auto Basophil # : 0.00 K/uL  Auto Neutrophil % : 86.2 %  Auto Lymphocyte % : 8.1 %  Auto Monocyte % : 3.7 %  Auto Eosinophil % : 1.7 %  Auto Basophil % : 0.0 %      07-28    139  |  101  |  106<H>  ----------------------------<  130<H>  5.4<H>   |  22  |  15.60<H>    Ca    8.0<L>      28 Jul 2023 10:35  Phos  5.7     07-28      CAPILLARY BLOOD GLUCOSE      POCT Blood Glucose.: 117 mg/dL (29 Jul 2023 07:46)  POCT Blood Glucose.: 153 mg/dL (28 Jul 2023 21:13)  POCT Blood Glucose.: 169 mg/dL (28 Jul 2023 17:02)          Creatinine Trend: 15.60<--, 15.80<--, 17.20<--, 15.90<--, 10.60<--, 10.80<--  I&O's Summary          .Blood Blood-Peripheral  05-28 @ 14:07   No Growth Final  --  --          MEDICATIONS:    MEDICATIONS  (STANDING):  albuterol/ipratropium for Nebulization 3 milliLiter(s) Nebulizer every 6 hours  amLODIPine   Tablet 10 milliGRAM(s) Oral daily  aspirin  chewable 81 milliGRAM(s) Oral daily  atorvastatin 40 milliGRAM(s) Oral at bedtime  budesonide 160 MICROgram(s)/formoterol 4.5 MICROgram(s) Inhaler 2 Puff(s) Inhalation two times a day  dextrose 5%. 1000 milliLiter(s) (50 mL/Hr) IV Continuous <Continuous>  dextrose 5%. 1000 milliLiter(s) (100 mL/Hr) IV Continuous <Continuous>  dextrose 50% Injectable 25 Gram(s) IV Push once  dextrose 50% Injectable 12.5 Gram(s) IV Push once  dextrose 50% Injectable 25 Gram(s) IV Push once  epoetin beverley (PROCRIT) Injectable 75553 Unit(s) IV Push <User Schedule>  folic acid 1 milliGRAM(s) Oral daily  furosemide    Tablet 80 milliGRAM(s) Oral daily  glucagon  Injectable 1 milliGRAM(s) IntraMuscular once  heparin   Injectable 5000 Unit(s) SubCutaneous every 12 hours  hydrALAZINE 25 milliGRAM(s) Oral three times a day  insulin glargine Injectable (LANTUS) 4 Unit(s) SubCutaneous at bedtime  latanoprost 0.005% Ophthalmic Solution 1 Drop(s) Both EYES at bedtime  levothyroxine 25 MICROGram(s) Oral daily  LORazepam     Tablet 0.5 milliGRAM(s) Oral <User Schedule>  metoclopramide 10 milliGRAM(s) Oral three times a day  metolazone 10 milliGRAM(s) Oral daily  metoprolol succinate ER 50 milliGRAM(s) Oral daily  oxycodone    5 mG/acetaminophen 325 mG 1 Tablet(s) Oral every 6 hours  pantoprazole    Tablet 40 milliGRAM(s) Oral before breakfast  sertraline 50 milliGRAM(s) Oral daily  sevelamer carbonate 800 milliGRAM(s) Oral three times a day  sucralfate 1 Gram(s) Oral four times a day      MEDICATIONS  (PRN):  dextrose Oral Gel 15 Gram(s) Oral once PRN Blood Glucose LESS THAN 70 milliGRAM(s)/deciliter  haloperidol    Injectable 0.5 milliGRAM(s) IntraMuscular every 8 hours PRN Agitation  ondansetron   Disintegrating Tablet 4 milliGRAM(s) Oral every 6 hours PRN Nausea and/or Vomiting      REVIEW OF SYSTEMS:                           ALL ROS DONE [ X   ]    CONSTITUTIONAL:  LETHARGIC [   ], FEVER [   ], UNRESPONSIVE [   ]  CVS:  CP  [   ], SOB, [   ], PALPITATIONS [   ], DIZZYNESS [   ]  RS: COUGH [   ], SPUTUM [   ]  GI: ABDOMINAL PAIN [   ], NAUSEA [   ], VOMITINGS [   ], DIARRHEA [   ], CONSTIPATION [   ]  :  DYSURIA [   ], NOCTURIA [   ], INCREASED FREQUENCY [   ], DRIBLING [   ],  SKELETAL: PAINFUL JOINTS [   ], SWOLLEN JOINTS [   ], NECK ACHE [   ], LOW BACK ACHE [   ],  SKIN : ULCERS [   ], RASH [   ], ITCHING [   ]  CNS: HEAD ACHE [   ], DOUBLE VISION [   ], BLURRED VISION [   ], AMS / CONFUSION [   ], SEIZURES [   ], WEAKNESS [   ],TINGLING / NUMBNESS [   ]    PHYSICAL EXAMINATION:  GENERAL APPEARANCE: NO DISTRESS,    ANASARCA ++  HEENT:  NO PALLOR, NO  JVD,  NO   NODES, NECK SUPPLE  CVS: S1 +, S2 +,   RS: AEEB,  OCCASIONAL  RALES +,   CRACKLES+ AT LUNG BASES  ABD: SOFT, NT, NO, BS +  EXT: LEFT BKA  SKIN: WARM,   SKELETAL:  ROM ACCEPTABLE  CNS:  AAO X  3      RADIOLOGY :      ASSESSMENT :     Hypervolemia    No pertinent past medical history    Hypertension    Adrenal insufficiency    CKD (chronic kidney disease)    Anemia    Glaucoma    Coronary artery disease    HLD (hyperlipidemia)    Peripheral vascular disease    Spinal stenosis of lumbosacral region    Hyperparathyroidism    Diabetes mellitus    Diabetic neuropathy    Contracture of hand    Osteoarthritis    Osteoporosis    Vision loss of left eye    ESRD on hemodialysis    Cataract    BPH (benign prostatic hyperplasia)    UTI (urinary tract infection)    Bladder mass    H/O hematuria    Osteoporosis    Vision loss of right eye    Depression    Chronic GERD    Osteomyelitis of vertebra    CHF (congestive heart failure)    No significant past surgical history    Below knee amputation status, left    History of right cataract extraction    History of left cataract extraction    S/P arteriovenous (AV) fistula creation    H/O hematuria    H/O transurethral destruction of bladder lesion    History of excision of mass        PLAN:  HPI:  Patient is 62 year old male from [facility], walks with RW, with PMH of ESRD on HD (TTS), BKA- L w/prosthetic leg, DM, Left eye blindness, CHF, CAD, HTN, HLD, osteoporosis, bronchitis, current smoker, and anemia who presents with complaint of missed dialysis. Last HD 7/22. Pt states he often missed HD sessions.  patient states he missed this HD session due to mild pain in left leg, patient remains able to ambulate. Pt complains of right leg swelling and states he does not make any urine. Patient states he was having mild shortness of breath.  Pt denies any fever, chills, N/V/D, constipation, CP, numbness or tingling (26 Jul 2023 19:20)    # PATIENT W/ HISTORY OF ROUTINE NONCOMPLIANCE W/ LIFE-SUSTAINING TREATMENT [HD], EVALUATIONS AND INTERVENTIONS - COUNSELLED AT LENGTH TO REMAIN COMPLIANT. D/W PATIENT THAT PROGNOSIS IS GRIM/POOR. HE VERBALIZED UNDERSTANDING. REGARDING GOC - PATIENT WISHES FOR FULL CODE. PALLIATIVE CARE CONSULTED. PSYCHIATRY CONSULTED.  - PATIENT IS ORIENTED TO PERSON, PLACE AND CIRCUMSTANCE. HE EXPRESSED THAT HE DOES GROW IMPATIENT DURING DIALYSIS SESSIONS AND HAS BEEN LEAVING SESSIONS SOONER THAN PRESCRIBED. IN DISCUSSION THAT RISKS OF DEFERRING COMPLETE HD - INCLUDE BUT ARE NOT LIMITED TO WORSENING CLINICAL CONDITION, ELECTROLYTE ABNORMALITIES, ARRHYTHMIA AND DEATH. HE VERBALIZED UNDERSTANDING. HE REFUSES TO CONSIDER A NURSING HOME WITH ONSITE HD.   - D/W PATIENT THAT REPEATEDLY REFUSING INTERVENTIONS AND ASSESSMENTS IS NOT IN LINE WITH HIS WISHES TO IMPROVE. PATIENT VERBALIZED UNDERSTANDING AND AGREEMENT. IN DISCUSSION, REGARDING POSSIBLE ETIOLOGY - PSYCHIATRY WAS CONSULTED TO DISCUSS MOOD, PATIENT DOES EXPRESS THAT HE HAS SOME DEPRESSION GIVEN HIS MEDICAL CONDITIONS. BUT HE DENIES THAT THIS IS THE ETIOLOGY FOR NONCOMPLIANCE. HE EXPLAINS THAT HE DOES NOT LIKE BEING CONFINED TO A DIALYSIS MACHINE. OFFERED FOR PATIENT TO CONSIDER NURSING HOME W/ ONSITE HD. PATIENT WISHES TO CONTINUE TO LIVE IN ASSISTED LIVING.    # ACUTE ON CHRONIC HYPOXIC RESPIRATORY FAILURE S/T PULMONARY EDEMA - S/T INCOMPLETE HD SESSIONS ; ANASARCA  # HYPERKALEMIA  # HX OF COPD + S/P RECENT MULTIFOCAL PNEUMONIA  # UNCONTROLLED HTN  # ESRD ON HD TTS - W/ HX OF NONCOMPLIANCE    - TELEMETRY  - HYDRALAZINE, METOPROLOL AND NORVASC  - LOKELMA  - SUPPLEMENTAL O2  - PLANNED FOR HD  - NEPHROLOGY CONSULT    - PLANNED FOR HD 7/26, 7/27 [INCOMPLETE SESSIONS]  - REFUSED HD ON 7/28  - UNDERWENT HD 7/29 [COMPLETE SESSION]    # ELEVATED TROPONINS - ? DEMAND ISCHEMIA    - MONITOR ON TELEMETRY  - TREND TROPONINS  - CARDIOLOGY CONSULT      # HX OF RECURRENT INTRACTABLE NAUSEA/VOMITING, GASTROPARESIS  # HISTORY OF ESOPHAGITIS AND DUODENITIS [1/2021], HX OF GASTROPARESIS W/ EVIDENCE OF THICKENED ESOPHAGUS ON PREVIOUS CT SCAN   # PANCREAS W/ DOUBLE DUCT SIGN    - DENIES RECENT HEMATEMESIS   - MONITORING HGB, PLACED ON PPI BID , CARAFATE TID  - CARAFATE, PRN ANTIEMETICS  - MONITORING FOR SYMPTOMS  - TOLERATING DIET ; PATIENT REPEATEDLY COUNSELLED TO CHEW FOOD CAUTIOUSLY AND CONSUME SMALL BITES W/ REGULAR CLEARING WITH WATER BETWEEN BITES.    - S/P RECENT PRBC TRANSFUSION     - RECENT GI CONSULT - RECOMMENDED OUTPATIENT F/U    # GASTROPARESIS  # UNDERLYING DM  - SSI + FS  - COUNSELLED TO COMPLY WITH HD TO REDUCE UREMIA    # DEPRESSION  - PLACED ON ZOLOFT  - PSYCHIATRY CONSULT    # PATIENT UNDERGOING OUTPATIENT WORKUP FOR CENTRAL VENOUS STENOSIS THROUGH NEPHROLOGY TEAM  - ? s/p STENT PLACEMENT    # ANEMIA OF CKD  # HX OF PANCYTOPENIA   - TREND HGB, TRANSFUSION THRESHOLD HGB < 7  ; PATIENT STILL INTERMITTENTLY REFUSING BLOODWORK, COUNSELLED TO COMPLY  - TYPE AND SCREEN  - ON EPO  - DENIES RECENT HEMATEMESIS, MELENA, HEMATOCHEZIA    - S/P RECENT PRBC TRANSFUSION  - PLANNED FOR PRBC TRANSFUSION 7/29    - PPI BID, CARAFATE QID    # SEVERE PROTEIN CALORIE MALNUTRITION, FAILURE TO THRIVE - NUTRITIONAL SUPPLEMENT    # HLD  # PARTIALLY BLIND  # S/P LEFT BKA  # HX OF PVD  # LS SPINAL STENOSIS  # GI AND DVT PPX .

## 2023-07-29 NOTE — PROGRESS NOTE ADULT - ASSESSMENT
# ESRD. he is grossly fluid overloaded. agreed to HD today, continue TTS schedule   Encouraged to stay for the full treatment time   noted, psychiatry service [id number]  # anemia of CKD. epogen on HD. transfuse for HBG <7  # RENAL OSTEODYSTROPHY .cont  SEVELAMER  # HTN. cont current medications . UF at HD       For any question, call:  Cell # [phone]  Pager # [phone]  Callback # [phone]

## 2023-07-29 NOTE — PROGRESS NOTE ADULT - SUBJECTIVE AND OBJECTIVE BOX
no chest pain or sob        albuterol/ipratropium for Nebulization 3 milliLiter(s) Nebulizer every 6 hours  amLODIPine   Tablet 10 milliGRAM(s) Oral daily  aspirin  chewable 81 milliGRAM(s) Oral daily  atorvastatin 40 milliGRAM(s) Oral at bedtime  budesonide 160 MICROgram(s)/formoterol 4.5 MICROgram(s) Inhaler 2 Puff(s) Inhalation two times a day  dextrose 5%. 1000 milliLiter(s) IV Continuous <Continuous>  dextrose 5%. 1000 milliLiter(s) IV Continuous <Continuous>  dextrose 50% Injectable 25 Gram(s) IV Push once  dextrose 50% Injectable 12.5 Gram(s) IV Push once  dextrose 50% Injectable 25 Gram(s) IV Push once  dextrose Oral Gel 15 Gram(s) Oral once PRN  epoetin beverley (PROCRIT) Injectable 98871 Unit(s) IV Push <User Schedule>  folic acid 1 milliGRAM(s) Oral daily  furosemide    Tablet 80 milliGRAM(s) Oral daily  glucagon  Injectable 1 milliGRAM(s) IntraMuscular once  haloperidol    Injectable 0.5 milliGRAM(s) IntraMuscular every 8 hours PRN  heparin   Injectable 5000 Unit(s) SubCutaneous every 12 hours  hydrALAZINE 25 milliGRAM(s) Oral three times a day  insulin glargine Injectable (LANTUS) 4 Unit(s) SubCutaneous at bedtime  latanoprost 0.005% Ophthalmic Solution 1 Drop(s) Both EYES at bedtime  levothyroxine 25 MICROGram(s) Oral daily  LORazepam     Tablet 0.5 milliGRAM(s) Oral <User Schedule>  metoclopramide 10 milliGRAM(s) Oral three times a day  metolazone 10 milliGRAM(s) Oral daily  metoprolol succinate ER 50 milliGRAM(s) Oral daily  ondansetron   Disintegrating Tablet 4 milliGRAM(s) Oral every 6 hours PRN  oxycodone    5 mG/acetaminophen 325 mG 1 Tablet(s) Oral every 6 hours  pantoprazole    Tablet 40 milliGRAM(s) Oral before breakfast  sertraline 50 milliGRAM(s) Oral daily  sevelamer carbonate 800 milliGRAM(s) Oral three times a day  sucralfate 1 Gram(s) Oral four times a day                            7.0    3.47  )-----------( 62 ( 28 Jul 2023 10:35 )             22.4       Hemoglobin: 7.0 g/dL (07-28 @ 10:35)  Hemoglobin: 7.2 g/dL (07-26 @ 14:10)      07-28    139  |  101  |  106<H>  ----------------------------<  130<H>  5.4<H>   |  22  |  15.60<H>    Ca    8.0<L>      28 Jul 2023 10:35  Phos  5.7     07-28      Creatinine Trend: 15.60<--, 15.80<--, 17.20<--, 15.90<--, 10.60<--, 10.80<--    COAGS:           T(C): 36.9 (07-29-23 @ 07:36), Max: 37.2 (07-28-23 @ 11:14)  HR: 84 (07-29-23 @ 07:36) (78 - 96)  BP: 155/88 (07-29-23 @ 07:36) (142/76 - 157/91)  RR: 19 (07-29-23 @ 07:36) (17 - 20)  SpO2: 92% (07-29-23 @ 07:36) (85% - 96%)  Wt(kg): --    I&O's Summary    Gen: Appears well in NAD  HEENT:  (-)icterus (-)pallor  CV: N S1 S2 1/6 DIANA (+)2 Pulses B/l  Resp:  Clear to ausculatation B/L, normal effort  GI: (+) BS Soft, NT, ND  Lymph:  (-)Edema, (-)obvious lymphadenopathy  Skin: Warm to touch, Normal turgor  Psych: Appropriate mood and affect       ECG:  	sinus 1degree AV block    RADIOLOGY:         CXR:    < from: Xray Chest 1 View- PORTABLE-Urgent (07.26.23 @ 13:00) >  1. Discoid atelectasis in the right upper lobe adjacent to the minor   fissure versus pleural fluid within the fissure itself. This is not   present previously.  2. Cardiomegaly without pulmonary edema.  3. Right sided subclavian vascular stent along with apparent coils within   the soft tissues of the medial right upper extremity, unchanged.    < end of copied text >    ASSESSMENT/PLAN: 	62y Male  Mateus Murray, walks with RW, with PMH of ESRD on HD (TTS), BKA- L w/prosthetic leg, DM, Left eye blindness, CHF,, HTN, HLD, EF 45-50%, normal perfusion 4/23 osteoporosis, bronchitis, current smoker, and anemia who presents with complaint of missed dialysis.     # CHF  - Due to missed HD  - HD per renal  - check echo for interval changes, pending     # Positive trop  - nothing to suggest ACS.  His trop has been higher in the past and demonstrated normal perfusion on Stress 4/23    # Noncompliance  - Behavioral health f/u    # Smoking  - Cessation stressed    # HTN  - Suspect BP will improve once euvolemic

## 2023-07-29 NOTE — CHART NOTE - NSCHARTNOTEFT_GEN_A_CORE
62 year old male from [facility], walks with RW, with PMHx of ESRD on HD (TTS), BKA- L w/prosthetic leg, DM, Left eye blindness, CHF, CAD, HTN, HLD, osteoporosis, bronchitis, current smoker, and anemia who presents with complaint of missed dialysis. Last HD 7/22.    Pt's hgb this am was 7.0, repeat hgb was 6.5.    #Anemia  - Type and screen (will draw while on HD)  - 1 unit of PRBC once type and screen resulted  - pt adamantly wanted to get off the HD, he is in session for only 30 minutes    I spoke with the patient about the importance of him staying thru the entire session, however pt became verbally aggressive. Called the emergency contact in chart (Georgi King) and have him speak to the patient to assist with convincing the pt to complete the HD session. Also updated about the patient's hgb and the need for blood transfusion. He consented for blood transfusion.    Plan discussed with Dr Britt Ayers, NP 62 year old male from [facility], walks with RW, with PMHx of ESRD on HD (TTS), BKA- L w/prosthetic leg, DM, Left eye blindness, CHF, CAD, HTN, HLD, osteoporosis, bronchitis, current smoker, and anemia who presents with complaint of missed dialysis. Last HD 7/22.    Pt's hgb this am was 7.0, repeat hgb was 6.5.    #Anemia  - Type and screen (will draw while on HD)  - 1 unit of PRBC once type and screen resulted  - pt adamantly wanted to get off the HD, he is in session for only 30 minutes    I spoke with the patient about the importance of him staying thru the entire session, however pt became verbally aggressive. Called the emergency contact in chart (Georgi King) and have him speak to the patient to assist with convincing the pt to complete the HD session. Also updated about the patient's hgb and the need for blood transfusion. He consented for blood transfusion.    Addendum: Pt refused post transfusion CBC, will repeat in AM    Plan discussed with Dr Britt Ayers, NP

## 2023-07-29 NOTE — PROGRESS NOTE ADULT - SUBJECTIVE AND OBJECTIVE BOX
[facility] - POLA Garcia / POLA Doll / AKSHAT Tello/ POLA Maddox/ POLA Young/ ELISA [place] / JARAD Mosher / FLORA Laboy  ---------------------------------------------------------------------------------------------------------------  seen and examined today for ESRD  Interval : NAD  VITALS:  T(F): 98 (07-29-23 @ 11:13), Max: 98.6 (07-29-23 @ 00:18)  HR: 80 (07-29-23 @ 11:13)  BP: 143/78 (07-29-23 @ 11:13)  RR: 18 (07-29-23 @ 11:13)  SpO2: 92% (07-29-23 @ 11:13)  Wt(kg): --    Physical Exam :-  Constitutional: NAD  Neck: Supple.  Respiratory: Bilateral equal breath sounds,  Cardiovascular: S1, S2 normal,  Gastrointestinal: Bowel Sounds present, soft, non tender.  Extremities: No edema, left BKA  Neurological: Alert and Oriented  Psychiatric: Normal mood, normal affect  Data:-  Allergies :   No Known Drug Allergies  fish (Rash)  liver (Anaphylaxis)    Hospital Medications:   MEDICATIONS  (STANDING):  albuterol/ipratropium for Nebulization 3 milliLiter(s) Nebulizer every 6 hours  amLODIPine   Tablet 10 milliGRAM(s) Oral daily  aspirin  chewable 81 milliGRAM(s) Oral daily  atorvastatin 40 milliGRAM(s) Oral at bedtime  budesonide 160 MICROgram(s)/formoterol 4.5 MICROgram(s) Inhaler 2 Puff(s) Inhalation two times a day  dextrose 5%. 1000 milliLiter(s) (50 mL/Hr) IV Continuous <Continuous>  dextrose 5%. 1000 milliLiter(s) (100 mL/Hr) IV Continuous <Continuous>  dextrose 50% Injectable 25 Gram(s) IV Push once  dextrose 50% Injectable 25 Gram(s) IV Push once  dextrose 50% Injectable 12.5 Gram(s) IV Push once  epoetin beverley (PROCRIT) Injectable 94214 Unit(s) IV Push <User Schedule>  folic acid 1 milliGRAM(s) Oral daily  furosemide    Tablet 80 milliGRAM(s) Oral daily  glucagon  Injectable 1 milliGRAM(s) IntraMuscular once  heparin   Injectable 5000 Unit(s) SubCutaneous every 12 hours  hydrALAZINE 25 milliGRAM(s) Oral three times a day  insulin glargine Injectable (LANTUS) 4 Unit(s) SubCutaneous at bedtime  latanoprost 0.005% Ophthalmic Solution 1 Drop(s) Both EYES at bedtime  levothyroxine 25 MICROGram(s) Oral daily  LORazepam     Tablet 0.5 milliGRAM(s) Oral <User Schedule>  metoclopramide 10 milliGRAM(s) Oral three times a day  metolazone 10 milliGRAM(s) Oral daily  metoprolol succinate ER 50 milliGRAM(s) Oral daily  oxycodone    5 mG/acetaminophen 325 mG 1 Tablet(s) Oral every 6 hours  pantoprazole    Tablet 40 milliGRAM(s) Oral before breakfast  sertraline 50 milliGRAM(s) Oral daily  sevelamer carbonate 800 milliGRAM(s) Oral three times a day  sucralfate 1 Gram(s) Oral four times a day    07-29    138  |  102  |  113<H>  ----------------------------<  159<H>  5.3   |  21<L>  |  16.60<H>    Ca    7.6<L>      29 Jul 2023 11:12  Phos  5.7     07-29  Mg     3.2     07-29      Creatinine Trend: 16.60 <--, 15.60 <--, 15.80 <--                        6.5    3.52  )-----------( 67       ( 29 Jul 2023 11:12 )             20.5

## 2023-07-30 NOTE — CHART NOTE - NSCHARTNOTEFT_GEN_A_CORE
Pt was a rapid response for worsening shortness of breath likely due to pulmonary edema and was placed on 15L NRB. Pt was a rapid response for worsening shortness of breath likely due to pulmonary edema and was placed on 15L NRB.    f/u repeat CXR Pt was a rapid response for worsening shortness of breath and hypoxia 70-80% on room air, likely due to pulmonary edema and was placed on 15L NRB.    f/u repeat CXR

## 2023-07-30 NOTE — PROGRESS NOTE ADULT - SUBJECTIVE AND OBJECTIVE BOX
Patient is a 62y old  Male who presents with a chief complaint of Missed dialysis (30 Jul 2023 17:18)    PATIENT IS SEEN AND EXAMINED IN MEDICAL FLOOR.  SULTANA [    ]    JEREMY [   ]      GT [   ]    ALLERGIES:  No Known Drug Allergies  fish (Rash)  liver (Anaphylaxis)      Daily     Daily     VITALS:    Vital Signs Last 24 Hrs  T(C): 37.2 (30 Jul 2023 20:45), Max: 37.4 (30 Jul 2023 16:05)  T(F): 99 (30 Jul 2023 20:45), Max: 99.3 (30 Jul 2023 16:05)  HR: 100 (30 Jul 2023 20:45) (80 - 100)  BP: 155/75 (30 Jul 2023 20:45) (155/75 - 189/86)  BP(mean): --  RR: 18 (30 Jul 2023 20:45) (18 - 22)  SpO2: 100% (30 Jul 2023 20:45) (94% - 100%)    Parameters below as of 30 Jul 2023 20:45  Patient On (Oxygen Delivery Method): nasal cannula  O2 Flow (L/min): 4      LABS:    CBC Full  -  ( 30 Jul 2023 03:07 )  WBC Count : 4.87 K/uL  RBC Count : 2.98 M/uL  Hemoglobin : 8.0 g/dL  Hematocrit : 25.1 %  Platelet Count - Automated : 71 K/uL  Mean Cell Volume : 84.2 fl  Mean Cell Hemoglobin : 26.8 pg  Mean Cell Hemoglobin Concentration : 31.9 gm/dL  Auto Neutrophil # : 4.38 K/uL  Auto Lymphocyte # : 0.20 K/uL  Auto Monocyte # : 0.18 K/uL  Auto Eosinophil # : 0.08 K/uL  Auto Basophil # : 0.01 K/uL  Auto Neutrophil % : 90.0 %  Auto Lymphocyte % : 4.1 %  Auto Monocyte % : 3.7 %  Auto Eosinophil % : 1.6 %  Auto Basophil % : 0.2 %      07-30    141  |  102  |  71<H>  ----------------------------<  92  3.9   |  25  |  11.30<H>    Ca    7.9<L>      30 Jul 2023 03:07  Phos  3.7     07-30  Mg     2.6     07-30    TPro  6.6  /  Alb  3.1<L>  /  TBili  0.7  /  DBili  x   /  AST  26  /  ALT  41  /  AlkPhos  97  07-30    CAPILLARY BLOOD GLUCOSE      POCT Blood Glucose.: 76 mg/dL (30 Jul 2023 21:28)  POCT Blood Glucose.: 78 mg/dL (30 Jul 2023 12:10)  POCT Blood Glucose.: 77 mg/dL (30 Jul 2023 07:47)  POCT Blood Glucose.: 92 mg/dL (30 Jul 2023 03:04)        LIVER FUNCTIONS - ( 30 Jul 2023 03:07 )  Alb: 3.1 g/dL / Pro: 6.6 g/dL / ALK PHOS: 97 U/L / ALT: 41 U/L DA / AST: 26 U/L / GGT: x           Creatinine Trend: 11.30<--, 16.60<--, 15.60<--, 15.80<--, 17.20<--, 15.90<--  I&O's Summary    29 Jul 2023 07:01  -  30 Jul 2023 07:00  --------------------------------------------------------  IN: 750 mL / OUT: 3156 mL / NET: -2406 mL            .Blood Blood-Peripheral  05-28 @ 14:07   No Growth Final  --  --          MEDICATIONS:    MEDICATIONS  (STANDING):  albuterol/ipratropium for Nebulization 3 milliLiter(s) Nebulizer every 6 hours  amLODIPine   Tablet 10 milliGRAM(s) Oral daily  aspirin enteric coated 81 milliGRAM(s) Oral daily  atorvastatin 40 milliGRAM(s) Oral at bedtime  budesonide 160 MICROgram(s)/formoterol 4.5 MICROgram(s) Inhaler 2 Puff(s) Inhalation two times a day  cefepime   IVPB 1000 milliGRAM(s) IV Intermittent every 24 hours  dextrose 5%. 1000 milliLiter(s) (50 mL/Hr) IV Continuous <Continuous>  dextrose 5%. 1000 milliLiter(s) (100 mL/Hr) IV Continuous <Continuous>  dextrose 50% Injectable 25 Gram(s) IV Push once  dextrose 50% Injectable 12.5 Gram(s) IV Push once  dextrose 50% Injectable 25 Gram(s) IV Push once  epoetin beverley (PROCRIT) Injectable 77133 Unit(s) IV Push <User Schedule>  folic acid 1 milliGRAM(s) Oral daily  furosemide    Tablet 80 milliGRAM(s) Oral daily  glucagon  Injectable 1 milliGRAM(s) IntraMuscular once  hydrALAZINE 25 milliGRAM(s) Oral three times a day  insulin glargine Injectable (LANTUS) 4 Unit(s) SubCutaneous at bedtime  latanoprost 0.005% Ophthalmic Solution 1 Drop(s) Both EYES at bedtime  levothyroxine 25 MICROGram(s) Oral daily  LORazepam     Tablet 0.5 milliGRAM(s) Oral <User Schedule>  metoprolol succinate ER 50 milliGRAM(s) Oral daily  metroNIDAZOLE  IVPB 500 milliGRAM(s) IV Intermittent every 8 hours  pantoprazole  Injectable 40 milliGRAM(s) IV Push every 12 hours  sertraline 50 milliGRAM(s) Oral daily  sucralfate 1 Gram(s) Oral four times a day      MEDICATIONS  (PRN):  dextrose Oral Gel 15 Gram(s) Oral once PRN Blood Glucose LESS THAN 70 milliGRAM(s)/deciliter  haloperidol    Injectable 0.5 milliGRAM(s) IntraMuscular every 8 hours PRN Agitation  ondansetron Injectable 4 milliGRAM(s) IV Push every 6 hours PRN Nausea and/or Vomiting      REVIEW OF SYSTEMS:                           ALL ROS DONE [ X   ]    CONSTITUTIONAL:  LETHARGIC [   ], FEVER [   ], UNRESPONSIVE [   ]  CVS:  CP  [   ], SOB, [   ], PALPITATIONS [   ], DIZZYNESS [   ]  RS: COUGH [   ], SPUTUM [   ]  GI: ABDOMINAL PAIN [   ], NAUSEA [   ], VOMITINGS [   ], DIARRHEA [   ], CONSTIPATION [   ]  :  DYSURIA [   ], NOCTURIA [   ], INCREASED FREQUENCY [   ], DRIBLING [   ],  SKELETAL: PAINFUL JOINTS [   ], SWOLLEN JOINTS [   ], NECK ACHE [   ], LOW BACK ACHE [   ],  SKIN : ULCERS [   ], RASH [   ], ITCHING [   ]  CNS: HEAD ACHE [   ], DOUBLE VISION [   ], BLURRED VISION [   ], AMS / CONFUSION [   ], SEIZURES [   ], WEAKNESS [   ],TINGLING / NUMBNESS [   ]    PHYSICAL EXAMINATION:  GENERAL APPEARANCE: NO DISTRESS,    ANASARCA ++  HEENT:  NO PALLOR, NO  JVD,  NO   NODES, NECK SUPPLE  CVS: S1 +, S2 +,   RS: AEEB,  OCCASIONAL  RALES +,   CRACKLES+ AT LUNG BASES  ABD: SOFT, NT, NO, BS +  EXT: LEFT BKA  SKIN: WARM,   SKELETAL:  ROM ACCEPTABLE  CNS:  AAO X  3      RADIOLOGY :      ASSESSMENT :     Hypervolemia    No pertinent past medical history    Hypertension    Adrenal insufficiency    CKD (chronic kidney disease)    Anemia    Glaucoma    Coronary artery disease    HLD (hyperlipidemia)    Peripheral vascular disease    Spinal stenosis of lumbosacral region    Hyperparathyroidism    Diabetes mellitus    Diabetic neuropathy    Contracture of hand    Osteoarthritis    Osteoporosis    Vision loss of left eye    ESRD on hemodialysis    Cataract    BPH (benign prostatic hyperplasia)    UTI (urinary tract infection)    Bladder mass    H/O hematuria    Osteoporosis    Vision loss of right eye    Depression    Chronic GERD    Osteomyelitis of vertebra    CHF (congestive heart failure)    No significant past surgical history    Below knee amputation status, left    History of right cataract extraction    History of left cataract extraction    S/P arteriovenous (AV) fistula creation    H/O hematuria    H/O transurethral destruction of bladder lesion    History of excision of mass        PLAN:  HPI:  Patient is 62 year old male from [facility], walks with RW, with PMH of ESRD on HD (TTS), BKA- L w/prosthetic leg, DM, Left eye blindness, CHF, CAD, HTN, HLD, osteoporosis, bronchitis, current smoker, and anemia who presents with complaint of missed dialysis. Last HD 7/22. Pt states he often missed HD sessions.  patient states he missed this HD session due to mild pain in left leg, patient remains able to ambulate. Pt complains of right leg swelling and states he does not make any urine. Patient states he was having mild shortness of breath.  Pt denies any fever, chills, N/V/D, constipation, CP, numbness or tingling (26 Jul 2023 19:20)    # CASE DISCUSSED AT LENGTH WITH PATIENT'S BROTHER ARTEMIO @ [phone]. DISCUSSED REGARDING CURRENT CLINICAL CONDITION, RECOMMENDED EVALUATIONS AND INTERVENTIONS. ALL QUESTIONS ANSWERED. DISCUSSED THAT PROGNOSIS IS POOR GIVEN UNDERLYING MEDICAL CONDITIONS. ALSO DISCUSSED THAT DESPITE VERBALIZING UNDERSTANDING OF MEDICAL CONDITIONS AND RECOMMENDED INTERVENTIONS - PATIENT PERSISTENTLY REMAINS NONCOMPLIANT. TEAM HAS OFFERED PATIENT EMOTIONAL SUPPORT AND OFFERED MEDICATION FOR MOOD.    # PATIENT W/ HISTORY OF ROUTINE NONCOMPLIANCE W/ LIFE-SUSTAINING TREATMENT [HD], EVALUATIONS AND INTERVENTIONS - COUNSELLED AT LENGTH TO REMAIN COMPLIANT. D/W PATIENT THAT PROGNOSIS IS GRIM/POOR. HE VERBALIZED UNDERSTANDING. REGARDING GOC - PATIENT WISHES FOR FULL CODE. PALLIATIVE CARE CONSULTED. PSYCHIATRY CONSULTED.  - PATIENT IS ORIENTED TO PERSON, PLACE AND CIRCUMSTANCE. HE EXPRESSED THAT HE DOES GROW IMPATIENT DURING DIALYSIS SESSIONS AND HAS BEEN LEAVING SESSIONS SOONER THAN PRESCRIBED. IN DISCUSSION THAT RISKS OF DEFERRING COMPLETE HD - INCLUDE BUT ARE NOT LIMITED TO WORSENING CLINICAL CONDITION, ELECTROLYTE ABNORMALITIES, ARRHYTHMIA AND DEATH. HE VERBALIZED UNDERSTANDING. HE REFUSES TO CONSIDER A NURSING HOME WITH ONSITE HD.   - D/W PATIENT THAT REPEATEDLY REFUSING INTERVENTIONS AND ASSESSMENTS IS NOT IN LINE WITH HIS WISHES TO IMPROVE. PATIENT VERBALIZED UNDERSTANDING AND AGREEMENT. IN DISCUSSION, REGARDING POSSIBLE ETIOLOGY - PSYCHIATRY WAS CONSULTED TO DISCUSS MOOD, PATIENT DOES EXPRESS THAT HE HAS SOME DEPRESSION GIVEN HIS MEDICAL CONDITIONS. BUT HE DENIES THAT THIS IS THE ETIOLOGY FOR NONCOMPLIANCE. HE EXPLAINS THAT HE DOES NOT LIKE BEING CONFINED TO A DIALYSIS MACHINE. OFFERED FOR PATIENT TO CONSIDER NURSING HOME W/ ONSITE HD. PATIENT WISHES TO CONTINUE TO LIVE IN ASSISTED LIVING.    # ACUTE ON CHRONIC HYPOXIC RESPIRATORY FAILURE S/T PULMONARY EDEMA - S/T INCOMPLETE HD SESSIONS ; ANASARCA  # HYPERKALEMIA  # HX OF COPD + S/P RECENT MULTIFOCAL PNEUMONIA ; R/O ASPIRATION PNEUMONIA  # UNCONTROLLED HTN  # ESRD ON HD TTS - W/ HX OF NONCOMPLIANCE    - TELEMETRY  - HYDRALAZINE, METOPROLOL AND NORVASC  - LOKELMA  - SUPPLEMENTAL O2  - PLANNED FOR HD  - NEPHROLOGY CONSULT    - PLANNED FOR HD 7/26, 7/27 [INCOMPLETE SESSIONS]  - REFUSED HD ON 7/28  - UNDERWENT HD 7/29 [COMPLETE SESSION]    - [7/30] - OVERNIGHT RAPID RESPONSE S/T HYPOXIA - SELF-LIMITED - PATIENT IMPROVED S/P O2 SUPPLEMENT  - EMPIRICALLY STARTED ON CEFEPIME + FLAGYLL, F/U BCX       # RECURRENT INTRACTABLE NAUSEA/VOMITING, ? COFFEE GROUND EMESIS  # GASTROPARESIS  # HISTORY OF ESOPHAGITIS AND DUODENITIS [1/2021], HX OF GASTROPARESIS W/ EVIDENCE OF THICKENED ESOPHAGUS ON PREVIOUS CT SCAN   # PANCREAS W/ DOUBLE DUCT SIGN    - MONITORING HGB, PLACED ON PPI BID , CARAFATE QID  - PRN ANTIEMETICS  - MONITORING FOR SYMPTOMS  - WHILE ON DIET PATIENT REPEATEDLY COUNSELLED TO CHEW FOOD CAUTIOUSLY AND CONSUME SMALL BITES W/ REGULAR CLEARING WITH WATER BETWEEN BITES    - NPO  - NOTED CT A/P    - S/P RECENT PRBC TRANSFUSION     - GI CONSULT  - SURGERY CONSULT    # R/O CHOLECYSTITIS  - PLACED ON CEFEPIME, F/U BCX  - NOTED CT A/P  - F/U HIDA SCAN  - SURGERY CONSULT    # ELEVATED TROPONINS - ? DEMAND ISCHEMIA    - MONITOR ON TELEMETRY  - TREND TROPONINS  - CARDIOLOGY CONSULT    # GASTROPARESIS  # UNDERLYING DM  - SSI + FS  - COUNSELLED TO COMPLY WITH HD TO REDUCE UREMIA    # DEPRESSION  - PLACED ON ZOLOFT  - PSYCHIATRY CONSULT    # PATIENT UNDERGOING OUTPATIENT WORKUP FOR CENTRAL VENOUS STENOSIS THROUGH NEPHROLOGY TEAM  - ? s/p STENT PLACEMENT    # ANEMIA OF CKD  # HX OF PANCYTOPENIA   - TREND HGB, TRANSFUSION THRESHOLD HGB < 7; PATIENT STILL INTERMITTENTLY REFUSING BLOODWORK, COUNSELLED TO COMPLY  - TYPE AND SCREEN  - ON EPO  - DENIES RECENT HEMATEMESIS, MELENA, HEMATOCHEZIA    - S/P RECENT PRBC TRANSFUSION  - S/P PRBC TRANSFUSION 7/29    - PPI BID, CARAFATE QID    # SEVERE PROTEIN CALORIE MALNUTRITION, FAILURE TO THRIVE   -  TEMPORAL WASTING, LOSS OF MUSCLE MASS FROM SHOULDER AND HIP GIRDLE  - NUTRITIONAL SUPPLEMENT    # HLD  # PARTIALLY BLIND  # S/P LEFT BKA  # HX OF PVD  # LS SPINAL STENOSIS  # GI AND DVT PPX

## 2023-07-30 NOTE — CONSULT NOTE ADULT - SUBJECTIVE AND OBJECTIVE BOX
HPI:  Patient is 62 year old male from [facility], walks with RW, with PMH of ESRD on HD (TTS), BKA- L w/prosthetic leg, DM, Left eye blindness, CHF, CAD, HTN, HLD, osteoporosis, bronchitis, current smoker, and anemia who presents with complaint of missed dialysis. Last HD 7/22. Pt states he often missed HD sessions.  patient states he missed this HD session due to mild pain in left leg, patient remains able to ambulate. Pt complains of right leg swelling and states he does not make any urine. Patient states he was having mild shortness of breath.  Pt denies any fever, chills, N/V/D, constipation, CP, numbness or tingling (26 Jul 2023 19:20)  < from: CT Abdomen and Pelvis No Cont (07.30.23 @ 13:35) >  FINDINGS: The examination is limited by lack of IV contrast.  LOWER CHEST: Small bilateral pleural effusions. Small bilateral   atelectasis. Nonspecific groundglass densities in the right lower lung   zone; clinical correlation with pneumonia is suggested. Cardiomegaly and   mild pericardial effusion, unchanged.    LIVER: Within normal limits.  BILE DUCTS: Dilated common bile duct again noted.  GALLBLADDER: Small gallstones. Nonspecific trace pericholecystic fluid.  SPLEEN: Within normal limits.  PANCREAS: Dilated main pancreaticduct is again noted.  ADRENALS: The right adrenal appears unremarkable. Nonspecific left   adrenal thickening.  KIDNEYS/URETERS: Within normal limits except for a few small hypodense   lesions in the kidneys bilaterally, representing probable cysts.    BLADDER: Underdistended.  REPRODUCTIVE ORGANS: The prostate and seminal vesicles appear grossly   unremarkable.    BOWEL: No bowel obstruction. Appendix unremarkable. Nonspecific mild   distal esophageal mural thickening.  PERITONEUM: Small ascites. No free air.  VESSELS: Calcified atherosclerotic disease.  RETROPERITONEUM/LYMPH NODES: No lymphadenopathy.  ABDOMINAL WALL: Anasarca again noted.  BONES: No acute CT findings.    IMPRESSION: Small gallstones. Nonspecific trace pericholecystic fluid..   If there is a clinical suspicion for acute cholecystitis, gallbladder   ultrasound/HIDA scan may be pursued for further evaluation.    Stable dilated common bile duct and main pancreatic duct. Please see   previous MRCP dated 2/23/2023.    No bowel obstruction. Appendix unremarkable. Nonspecific mild distal   esophageal mural thickening.    Small ascites.    Anasarca.    Small bilateral pleural effusions. Small bilateral atelectasis.   Nonspecific groundglass densities in the right lower lungzone; clinical   correlation with pneumonia is suggested.    Cardiomegaly and mild pericardial effusion, unchanged.    < end of copied text >      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      Osteoarthritis      Vision loss of left eye  blind      ESRD on hemodialysis  T/Th/S      Cataract  both eyes - hx of sx done      BPH (benign prostatic hyperplasia)      UTI (urinary tract infection)  hx of      Bladder mass  hx of      H/O hematuria      Osteoporosis      Vision loss of right eye  decreased      Depression      Chronic GERD      Osteomyelitis of vertebra      CHF (congestive heart failure)      Below knee amputation status, left  2012- pt is wearing prostesis      History of right cataract extraction      History of left cataract extraction      S/P arteriovenous (AV) fistula creation  right arm brachiocephalic arteriovenous fistula on 11/08/2018      H/O hematuria  s/p bladder bx and fulguration 2/25/2020      H/O transurethral destruction of bladder lesion  2020      History of excision of mass  back mass on 03/31/2021          Vital Signs Last 24 Hrs  T(C): 37.4 (30 Jul 2023 16:05), Max: 37.4 (30 Jul 2023 16:05)  T(F): 99.3 (30 Jul 2023 16:05), Max: 99.3 (30 Jul 2023 16:05)  HR: 92 (30 Jul 2023 16:05) (80 - 102)  BP: 156/74 (30 Jul 2023 16:05) (153/77 - 189/86)  BP(mean): --  RR: 18 (30 Jul 2023 16:05) (18 - 22)  SpO2: 100% (30 Jul 2023 16:05) (93% - 100%)    Parameters below as of 30 Jul 2023 16:05  Patient On (Oxygen Delivery Method): nasal cannula  O2 Flow (L/min): 4                            8.0    4.87  )-----------( 71       ( 30 Jul 2023 03:07 )             25.1     07-30    141  |  102  |  71<H>  ----------------------------<  92  3.9   |  25  |  11.30<H>    Ca    7.9<L>      30 Jul 2023 03:07  Phos  3.7     07-30  Mg     2.6     07-30    TPro  6.6  /  Alb  3.1<L>  /  TBili  0.7  /  DBili  x   /  AST  26  /  ALT  41  /  AlkPhos  97  07-30        PHYSICAL EXAM  General: WN/WD NAD  Neurology: A&Ox3, nonfocal, HUMPHREY x 4  Respiratory: CTA B/L  CV: RRR, S1S2, no murmurs, rubs or gallops  Abdominal: Soft, NT, ND +BS, Last BM  Extremities: No edema, + peripheral pulses        ASSESSMENT/ PLAN:   HPI:  Patient is 62 year old male from [facility], walks with RW, with PMH of ESRD on HD (TTS), BKA- L w/prosthetic leg, DM, Left eye blindness, CHF, CAD, HTN, HLD, osteoporosis, bronchitis, current smoker, and anemia who presents with complaint of missed dialysis. Last HD 7/22. Pt states he often missed HD sessions.  patient states he missed this HD session due to mild pain in left leg, patient remains able to ambulate. Pt complains of right leg swelling and states he does not make any urine. Patient states he was having mild shortness of breath.  Pt denies any fever, chills, N/V/D, constipation, CP, numbness or tingling (26 Jul 2023 19:20)    Surgery consulted for CT findings after n/v earlier today    < from: CT Abdomen and Pelvis No Cont (07.30.23 @ 13:35) >  FINDINGS: The examination is limited by lack of IV contrast.  LOWER CHEST: Small bilateral pleural effusions. Small bilateral   atelectasis. Nonspecific groundglass densities in the right lower lung   zone; clinical correlation with pneumonia is suggested. Cardiomegaly and   mild pericardial effusion, unchanged.    LIVER: Within normal limits.  BILE DUCTS: Dilated common bile duct again noted.  GALLBLADDER: Small gallstones. Nonspecific trace pericholecystic fluid.  SPLEEN: Within normal limits.  PANCREAS: Dilated main pancreaticduct is again noted.  ADRENALS: The right adrenal appears unremarkable. Nonspecific left   adrenal thickening.  KIDNEYS/URETERS: Within normal limits except for a few small hypodense   lesions in the kidneys bilaterally, representing probable cysts.    BLADDER: Underdistended.  REPRODUCTIVE ORGANS: The prostate and seminal vesicles appear grossly   unremarkable.    BOWEL: No bowel obstruction. Appendix unremarkable. Nonspecific mild   distal esophageal mural thickening.  PERITONEUM: Small ascites. No free air.  VESSELS: Calcified atherosclerotic disease.  RETROPERITONEUM/LYMPH NODES: No lymphadenopathy.  ABDOMINAL WALL: Anasarca again noted.  BONES: No acute CT findings.    IMPRESSION: Small gallstones. Nonspecific trace pericholecystic fluid..   If there is a clinical suspicion for acute cholecystitis, gallbladder   ultrasound/HIDA scan may be pursued for further evaluation.    Stable dilated common bile duct and main pancreatic duct. Please see   previous MRCP dated 2/23/2023.    No bowel obstruction. Appendix unremarkable. Nonspecific mild distal   esophageal mural thickening.    Small ascites.    Anasarca.    Small bilateral pleural effusions. Small bilateral atelectasis.   Nonspecific groundglass densities in the right lower lungzone; clinical   correlation with pneumonia is suggested.    Cardiomegaly and mild pericardial effusion, unchanged.    < end of copied text >      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      Osteoarthritis      Vision loss of left eye  blind      ESRD on hemodialysis  T/Th/S      Cataract  both eyes - hx of sx done      BPH (benign prostatic hyperplasia)      UTI (urinary tract infection)  hx of      Bladder mass  hx of      H/O hematuria      Osteoporosis      Vision loss of right eye  decreased      Depression      Chronic GERD      Osteomyelitis of vertebra      CHF (congestive heart failure)      Below knee amputation status, left  2012- pt is wearing prostesis      History of right cataract extraction      History of left cataract extraction      S/P arteriovenous (AV) fistula creation  right arm brachiocephalic arteriovenous fistula on 11/08/2018      H/O hematuria  s/p bladder bx and fulguration 2/25/2020      H/O transurethral destruction of bladder lesion  2020      History of excision of mass  back mass on 03/31/2021          Vital Signs Last 24 Hrs  T(C): 37.4 (30 Jul 2023 16:05), Max: 37.4 (30 Jul 2023 16:05)  T(F): 99.3 (30 Jul 2023 16:05), Max: 99.3 (30 Jul 2023 16:05)  HR: 92 (30 Jul 2023 16:05) (80 - 102)  BP: 156/74 (30 Jul 2023 16:05) (153/77 - 189/86)  BP(mean): --  RR: 18 (30 Jul 2023 16:05) (18 - 22)  SpO2: 100% (30 Jul 2023 16:05) (93% - 100%)    Parameters below as of 30 Jul 2023 16:05  Patient On (Oxygen Delivery Method): nasal cannula  O2 Flow (L/min): 4                            8.0    4.87  )-----------( 71       ( 30 Jul 2023 03:07 )             25.1     07-30    141  |  102  |  71<H>  ----------------------------<  92  3.9   |  25  |  11.30<H>    Ca    7.9<L>      30 Jul 2023 03:07  Phos  3.7     07-30  Mg     2.6     07-30    TPro  6.6  /  Alb  3.1<L>  /  TBili  0.7  /  DBili  x   /  AST  26  /  ALT  41  /  AlkPhos  97  07-30        PHYSICAL EXAM  GENERAL: lethargic, comfortable appearing  EYES: clear conjunctiva  ENMT: Moist mucous membranes  NECK: Supple, No JVD, Normal thyroid  CHEST/LUNG: +nasal cannula. Clear to auscultation bilaterally; No rales, rhonchi, wheezing, or rubs  HEART: S1, S2, Regular rate and rhythm  ABDOMEN: soft, mild RUQ fullness, Bowel sounds present  NEURO: Alert & Oriented X3  EXTREMITIES: +left BKA. No LE edema, no calf tenderness  LYMPH: No lymphadenopathy noted  SKIN: No rashes or lesions        ASSESSMENT/ PLAN:  Patient is 62 year old male from [facility], walks with RW, with PMH of ESRD on HD (TTS), BKA- L w/prosthetic leg, DM, Left eye blindness, CHF, CAD, HTN, HLD, osteoporosis, bronchitis, current smoker, and anemia who presents with complaint of missed dialysis. Last HD 7/22. Pt states he often missed HD sessions.  patient states he missed this HD session due to mild pain in left leg, patient remains able to ambulate. Pt complains of right leg swelling and states he does not make any urine. Patient states he was having mild shortness of breath.  Pt denies any fever, chills, N/V/D, constipation, CP, numbness or tingling (26 Jul 2023 19:20)    Surgery consulted for CT findings after n/v earlier today    CT susp for acute cholecystitis  f/u HIDA scan  no fever, leukocytosis  LFTs wnl  GI f/u regarding potential endoscopy/dilated CBD/main panc duct  d/w Dr Quan

## 2023-07-30 NOTE — CONSULT NOTE ADULT - SUBJECTIVE AND OBJECTIVE BOX
Time of visit:    CHIEF COMPLAINT: Patient is a 62y old  Male who presents with a chief complaint of Missed dialysis (30 Jul 2023 09:04)      HPI: This  is 62 year old man  from [facility], walks with  ESRD on HD (TTS), BKA- L w/prosthetic leg, DM, Left eye blindness, CHF, CAD, HTN, HLD, osteoporosis, bronchitis, current smoker, and anemia who presents with complaint of missed dialysis. Last HD 7/22. Pt states he often missed HD sessions.  patient states he missed this HD session due to mild pain in left leg, patient remains able to ambulate. Pt complains of right leg swelling and states he does not make any urine. Patient states he was having mild shortness of breath.  Pt denies any fever, chills, N/V/D, constipation, CP, numbness or tingling (26 Jul 2023 19:20)   Patient seen and examined.     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      Osteoarthritis      Vision loss of left eye  blind      ESRD on hemodialysis  T/Th/S      Cataract  both eyes - hx of sx done      BPH (benign prostatic hyperplasia)      UTI (urinary tract infection)  hx of      Bladder mass  hx of      H/O hematuria      Osteoporosis      Vision loss of right eye  decreased      Depression      Chronic GERD      Osteomyelitis of vertebra      CHF (congestive heart failure)      Below knee amputation status, left  2012- pt is wearing prostesis      History of right cataract extraction      History of left cataract extraction      S/P arteriovenous (AV) fistula creation  right arm brachiocephalic arteriovenous fistula on 11/08/2018      H/O hematuria  s/p bladder bx and fulguration 2/25/2020      H/O transurethral destruction of bladder lesion  2020      History of excision of mass  back mass on 03/31/2021          Allergies    No Known Drug Allergies  fish (Rash)  liver (Anaphylaxis)    Intolerances        MEDICATIONS  (STANDING):  albuterol/ipratropium for Nebulization 3 milliLiter(s) Nebulizer every 6 hours  amLODIPine   Tablet 10 milliGRAM(s) Oral daily  aspirin enteric coated 81 milliGRAM(s) Oral daily  atorvastatin 40 milliGRAM(s) Oral at bedtime  budesonide 160 MICROgram(s)/formoterol 4.5 MICROgram(s) Inhaler 2 Puff(s) Inhalation two times a day  cefepime   IVPB 1000 milliGRAM(s) IV Intermittent every 24 hours  dextrose 5%. 1000 milliLiter(s) (50 mL/Hr) IV Continuous <Continuous>  dextrose 5%. 1000 milliLiter(s) (100 mL/Hr) IV Continuous <Continuous>  dextrose 50% Injectable 25 Gram(s) IV Push once  dextrose 50% Injectable 25 Gram(s) IV Push once  dextrose 50% Injectable 12.5 Gram(s) IV Push once  epoetin beverley (PROCRIT) Injectable 36745 Unit(s) IV Push <User Schedule>  folic acid 1 milliGRAM(s) Oral daily  glucagon  Injectable 1 milliGRAM(s) IntraMuscular once  hydrALAZINE 25 milliGRAM(s) Oral three times a day  insulin glargine Injectable (LANTUS) 4 Unit(s) SubCutaneous at bedtime  latanoprost 0.005% Ophthalmic Solution 1 Drop(s) Both EYES at bedtime  levothyroxine 25 MICROGram(s) Oral daily  LORazepam     Tablet 0.5 milliGRAM(s) Oral <User Schedule>  metoprolol succinate ER 50 milliGRAM(s) Oral daily  metroNIDAZOLE  IVPB 500 milliGRAM(s) IV Intermittent every 8 hours  pantoprazole  Injectable 40 milliGRAM(s) IV Push every 12 hours  sertraline 50 milliGRAM(s) Oral daily  sucralfate 1 Gram(s) Oral four times a day      MEDICATIONS  (PRN):  dextrose Oral Gel 15 Gram(s) Oral once PRN Blood Glucose LESS THAN 70 milliGRAM(s)/deciliter  haloperidol    Injectable 0.5 milliGRAM(s) IntraMuscular every 8 hours PRN Agitation  ondansetron Injectable 4 milliGRAM(s) IV Push every 6 hours PRN Nausea and/or Vomiting   Medications up to date at time of exam.    Medications up to date at time of exam.    FAMILY HISTORY:  Family history of cirrhosis of liver (Mother)    Family history of renal failure (Sibling)    Family history of hypertension (Sibling)    Family history of diabetes mellitus (Sibling)    History of substance abuse in sibling (Sibling)        SOCIAL HISTORY  Smoking History: [  x ] smoking/smoke exposure, [   ] former smoker  Living Condition: [   ] apartment, [   ] private house  Work History:   Travel History: denies recent travel  Illicit Substance Use: denies  Alcohol Use: denies    REVIEW OF SYSTEMS:    CONSTITUTIONAL:  denies fevers, chills, sweats, weight loss    HEENT:  denies diplopia or blurred vision, sore throat or runny nose.    CARDIOVASCULAR:  denies pressure, squeezing, tightness, or heaviness about the chest; no palpitations.    RESPIRATORY:  denies SOB, cough, EDGE, wheezing.    GASTROINTESTINAL:  denies abdominal pain, nausea, vomiting or diarrhea.    GENITOURINARY: denies dysuria, frequency or urgency.    NEUROLOGIC:  denies numbness, tingling, seizures or weakness.    PSYCHIATRIC:  denies disorder of thought or mood.    MSK: denies swelling, redness      PHYSICAL EXAMINATION:    GENERAL: The patient is a well-developed, well-nourished, in no apparent distress.     Vital Signs Last 24 Hrs  T(C): 37.4 (30 Jul 2023 16:05), Max: 37.4 (30 Jul 2023 16:05)  T(F): 99.3 (30 Jul 2023 16:05), Max: 99.3 (30 Jul 2023 16:05)  HR: 92 (30 Jul 2023 16:05) (80 - 102)  BP: 156/74 (30 Jul 2023 16:05) (153/77 - 189/86)  BP(mean): --  RR: 18 (30 Jul 2023 16:05) (18 - 22)  SpO2: 100% (30 Jul 2023 16:05) (93% - 100%)    Parameters below as of 30 Jul 2023 16:05  Patient On (Oxygen Delivery Method): nasal cannula  O2 Flow (L/min): 4     (if applicable)    Chest Tube (if applicable)    HEENT: Head is normocephalic and atraumatic. Extraocular muscles are intact. Mucous membranes are moist.  O2 via NC     NECK: Supple, no palpable adenopathy.    LUNGS: Clear to auscultation, no wheezing, rales, or rhonchi.    HEART: Regular rate and rhythm without murmur.    ABDOMEN: Tense, NO BS ,     RENAL: No difficulty voiding, no pelvic pain    EXTREMITIES:  L BKA . right leg Without any cyanosis, clubbing, rash, lesions or edema.    NEUROLOGIC: Awake, alert, oriented, grossly intact    SKIN: Warm, dry, good turgor.      LABS:                        8.0    4.87  )-----------( 71       ( 30 Jul 2023 03:07 )             25.1     07-30    141  |  102  |  71<H>  ----------------------------<  92  3.9   |  25  |  11.30<H>    Ca    7.9<L>      30 Jul 2023 03:07  Phos  3.7     07-30  Mg     2.6     07-30    TPro  6.6  /  Alb  3.1<L>  /  TBili  0.7  /  DBili  x   /  AST  26  /  ALT  41  /  AlkPhos  97  07-30      Urinalysis Basic - ( 30 Jul 2023 03:07 )    Color: x / Appearance: x / SG: x / pH: x  Gluc: 92 mg/dL / Ketone: x  / Bili: x / Urobili: x   Blood: x / Protein: x / Nitrite: x   Leuk Esterase: x / RBC: x / WBC x   Sq Epi: x / Non Sq Epi: x / Bacteria: x                      MICROBIOLOGY: (if applicable)    RADIOLOGY & ADDITIONAL STUDIES:  EKG:   CXR:< from: Xray Chest 1 View-PORTABLE IMMEDIATE (Xray Chest 1 View-PORTABLE IMMEDIATE .) (07.30.23 @ 09:28) >    ACC: [id number] EXAM:  XR CHEST PORTABLE IMMED 1V   ORDERED BY: JULIETA MUJICA     PROCEDURE DATE:  07/30/2023          INTERPRETATION:  AP semisupine chest on July 30, 2023 at 9:14 AM. 2   images. Patient is short of breath.    Gross heart enlargement again noted. There is increasing perihilar   infiltrate now mild to moderate compared to July 26.    IMPRESSION: Increasing CHF. Gross heart enlargement again noted.    --- End of Report ---            ARASH REYNOSO MD; Attending Radiologist  This document has been electronically signed. Jul 30 2023  4:03PM    < end of copied text >    ECHO:< from: Transthoracic Echocardiogram (02.24.23 @ 08:51) >      IMPRESSION: 62y Male 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      Osteoarthritis      Vision loss of left eye  blind      ESRD on hemodialysis  T/Th/S      Cataract  both eyes - hx of sx done      BPH (benign prostatic hyperplasia)      UTI (urinary tract infection)  hx of      Bladder mass  hx of      H/O hematuria      Osteoporosis      Vision loss of right eye  decreased      Depression      Chronic GERD      Osteomyelitis of vertebra      CHF (congestive heart failure)      Below knee amputation status, left  2012- pt is wearing prostesis      History of right cataract extraction      History of left cataract extraction      S/P arteriovenous (AV) fistula creation  right arm brachiocephalic arteriovenous fistula on 11/08/2018      H/O hematuria  s/p bladder bx and fulguration 2/25/2020      H/O transurethral destruction of bladder lesion  2020      History of excision of mass  back mass on 03/31/2021       p/w       IMP: This  is 62 year old man  from [facility], walks with  ESRD on HD (TTS), BKA- L w/prosthetic leg, DM, Left eye blindness, CHF, CAD, HTN, HLD, osteoporosis, bronchitis, current smoker, and anemia who presents with complaint of missed dialysis. Last HD 7/22. Pt states he often missed HD sessions.  patient states he missed this HD session due to mild pain in left leg, patient remains able to ambulate. Pt complains of right leg swelling and states he does not make any urine. S/P multiple RRT for SOB and hypoxia due to pulmonary edema / fluid over load .  Currently lying in bed comfortable with O2 via NC . + vomiting with tense abd and NO BS .. suspicious for bowel obstruction       Sugg;    - Continue O2 supp   - Dialysis as per Neph   - Cards f/u , Elevated troponin is not related to ACS   - NPO  - NGT ( pat refused as per NP covering )   - CT abd / pelvis to evaluate for obstruction   - Surgical eval   - Advise to stop smoking   - Continue inhaler   - Medicine team started on multiple antibx   - F/U cultures       discussed with NP covering today

## 2023-07-30 NOTE — CHART NOTE - NSCHARTNOTEFT_GEN_A_CORE
medications reviewed:  lasix 80 mg po was dc in setting of anuria Pt is aox3, unable to eat due to ongoing nausea and vomiting, with dark colored emesis, concern for possible SBO.   Additionally pt remains on 4L NC, also concerning for possible AHRF 2/2 Aspiration.     medications reviewed:  lasix 80 mg po was dc in setting of anuria    Discussed with Attending Dr. De La Torre, will plan pt for CT A/P. Pt is aox3, unable to eat due to ongoing nausea and vomiting, with dark colored emesis, concern for possible SBO.   Additionally pt remains on 4L NC, also concerning for possible AHRF 2/2 Possible Aspiration PNA vs worsening Pulmonary Edema. Awaiting chest xray results.     Pt also has abdominal distention with RUQ and Umbilical Tenderness. Pt last bowel movement was 2 days ago.     medications also reviewed:  lasix 80 mg po was dc in setting of anuria    Discussed with Attending Dr. De La Torre, will plan pt for CT A/P. Pt is aox3, unable to eat due to ongoing nausea and vomiting, with dark colored emesis, concern for possible SBO.   Pt also reports abdominal pain, upon physical exam pt has abdominal distention with RUQ and Umbilical Tenderness. Pt last bowel movement was 2 days ago.   Additionally pt remains on 4L NC, also concerning for possible AHRF 2/2 Possible Aspiration PNA vs worsening Pulmonary Edema. Awaiting chest xray results.     medications also reviewed:  lasix 80 mg po was dc in setting of anuria    Discussed with Attending Dr. De La Torre, will plan pt for CT A/P. Pt is aox3, unable to eat due to ongoing nausea and vomiting, with dark colored emesis, concern for possible SBO.   Pt also reports abdominal pain, upon physical exam pt has abdominal distention with RUQ and Umbilical Tenderness. Pt last bowel movement was 2 days ago.   Additionally pt remains on 4L NC, also concerning for possible AHRF 2/2 Possible Aspiration PNA vs worsening Pulmonary Edema. Awaiting chest xray results.     medications also reviewed:  lasix 80 mg po and metolazone was dc in setting of anuria    Discussed with Attending Dr. De La Torre, will plan pt for CT A/P.    REVIEW OF SYSTEMS:  CONSTITUTIONAL: No fever, chills  ENMT:  No difficulty hearing, no change in vision  NECK: No pain or stiffness  RESPIRATORY: No cough, SOB  CARDIOVASCULAR: No chest pain, palpitations  GASTROINTESTINAL: +nausea, vomiting, abdominal pain.   GENITOURINARY: No dysuria  NEUROLOGICAL: No HA  SKIN: No itching, burning, rashes, or lesions   LYMPH NODES: No enlarged glands  ENDOCRINE: No heat or cold intolerance; No hair loss  MUSCULOSKELETAL: No joint pain or swelling; No muscle, back, or extremity pain  PSYCHIATRIC: No depression, anxiety  HEME/LYMPH: No easy bruising, or bleeding gums    T(C): 37 (07-30-23 @ 12:33), Max: 37.2 (07-30-23 @ 08:05)  HR: 80 (07-30-23 @ 12:33) (80 - 102)  BP: 189/86 (07-30-23 @ 12:33) (112/72 - 189/86)  RR: 18 (07-30-23 @ 12:33) (18 - 22)  SpO2: 98% (07-30-23 @ 12:33) (93% - 98%)  Wt(kg): --Vital Signs Last 24 Hrs  T(C): 37 (30 Jul 2023 12:33), Max: 37.2 (30 Jul 2023 08:05)  T(F): 98.6 (30 Jul 2023 12:33), Max: 99 (30 Jul 2023 08:05)  HR: 80 (30 Jul 2023 12:33) (80 - 102)  BP: 189/86 (30 Jul 2023 12:33) (112/72 - 189/86)  BP(mean): --  RR: 18 (30 Jul 2023 12:33) (18 - 22)  SpO2: 98% (30 Jul 2023 12:33) (93% - 98%)    Parameters below as of 30 Jul 2023 12:33  Patient On (Oxygen Delivery Method): nasal cannula  O2 Flow (L/min): 4      MEDICATIONS  (STANDING):  albuterol/ipratropium for Nebulization 3 milliLiter(s) Nebulizer every 6 hours  amLODIPine   Tablet 10 milliGRAM(s) Oral daily  aspirin  chewable 81 milliGRAM(s) Oral daily  atorvastatin 40 milliGRAM(s) Oral at bedtime  budesonide 160 MICROgram(s)/formoterol 4.5 MICROgram(s) Inhaler 2 Puff(s) Inhalation two times a day  dextrose 5%. 1000 milliLiter(s) (50 mL/Hr) IV Continuous <Continuous>  dextrose 5%. 1000 milliLiter(s) (100 mL/Hr) IV Continuous <Continuous>  dextrose 50% Injectable 25 Gram(s) IV Push once  dextrose 50% Injectable 25 Gram(s) IV Push once  dextrose 50% Injectable 12.5 Gram(s) IV Push once  epoetin beverley (PROCRIT) Injectable 32847 Unit(s) IV Push <User Schedule>  folic acid 1 milliGRAM(s) Oral daily  glucagon  Injectable 1 milliGRAM(s) IntraMuscular once  heparin   Injectable 5000 Unit(s) SubCutaneous every 12 hours  hydrALAZINE 25 milliGRAM(s) Oral three times a day  insulin glargine Injectable (LANTUS) 4 Unit(s) SubCutaneous at bedtime  latanoprost 0.005% Ophthalmic Solution 1 Drop(s) Both EYES at bedtime  levothyroxine 25 MICROGram(s) Oral daily  LORazepam     Tablet 0.5 milliGRAM(s) Oral <User Schedule>  metolazone 10 milliGRAM(s) Oral daily  metoprolol succinate ER 50 milliGRAM(s) Oral daily  oxycodone    5 mG/acetaminophen 325 mG 1 Tablet(s) Oral every 6 hours  pantoprazole  Injectable 40 milliGRAM(s) IV Push every 12 hours  sertraline 50 milliGRAM(s) Oral daily  sucralfate 1 Gram(s) Oral four times a day    MEDICATIONS  (PRN):  dextrose Oral Gel 15 Gram(s) Oral once PRN Blood Glucose LESS THAN 70 milliGRAM(s)/deciliter  haloperidol    Injectable 0.5 milliGRAM(s) IntraMuscular every 8 hours PRN Agitation  ondansetron Injectable 4 milliGRAM(s) IV Push every 6 hours PRN Nausea and/or Vomiting      FOCUSED PHYSICAL EXAM:  GENERAL: lethargic, uncomfortable appearing  EYES: clear conjunctiva  ENMT: Moist mucous membranes  NECK: Supple, No JVD, Normal thyroid  CHEST/LUNG: +nasal cannula. Clear to auscultation bilaterally; No rales, rhonchi, wheezing, or rubs  HEART: S1, S2, Regular rate and rhythm  ABDOMEN: Rigid, RUQ/umbilical tenderness, +distended; Bowel sounds present  NEURO: Alert & Oriented X3  EXTREMITIES: +left BKA. No LE edema, no calf tenderness  LYMPH: No lymphadenopathy noted  SKIN: No rashes or lesions    LABS:                        8.0    4.87  )-----------( 71       ( 30 Jul 2023 03:07 )             25.1     07-30    141  |  102  |  71<H>  ----------------------------<  92  3.9   |  25  |  11.30<H>    Ca    7.9<L>      30 Jul 2023 03:07  Phos  3.7     07-30  Mg     2.6     07-30    TPro  6.6  /  Alb  3.1<L>  /  TBili  0.7  /  DBili  x   /  AST  26  /  ALT  41  /  AlkPhos  97  07-30      CAPILLARY BLOOD GLUCOSE      POCT Blood Glucose.: 78 mg/dL (30 Jul 2023 12:10)  POCT Blood Glucose.: 77 mg/dL (30 Jul 2023 07:47)  POCT Blood Glucose.: 92 mg/dL (30 Jul 2023 03:04)  POCT Blood Glucose.: 132 mg/dL (29 Jul 2023 20:42)  POCT Blood Glucose.: 156 mg/dL (29 Jul 2023 16:37)        Urinalysis Basic - ( 30 Jul 2023 03:07 )    Color: x / Appearance: x / SG: x / pH: x  Gluc: 92 mg/dL / Ketone: x  / Bili: x / Urobili: x   Blood: x / Protein: x / Nitrite: x   Leuk Esterase: x / RBC: x / WBC x   Sq Epi: x / Non Sq Epi: x / Bacteria: x        ASSESSMENT:    62 year old male from [facility], walks with RW, with PMH of ESRD on HD (TTS), BKA- L w/prosthetic leg, DM, Left eye blindness, CHF, CAD, HTN, HLD, osteoporosis, bronchitis, current smoker, and anemia who presents with complaint of missed dialysis. Last HD 7/22. Pt states he often missed HD sessions.   Pt complains of right leg swelling and states he does not make any urine. Patient states he was having mild shortness of breath. Patient admitted to tele for missed dialysis found to have pulmonary edema and BNP [id number]. Cardiology and nephro following.     #Acute Hypoxic Respiratory Failure  possible in setting of pulmonary edema vs aspiration pneumonia vs COPD  continue 4L oxygen via nasal cannula  c/w duonebs q6h for now  maintain oxygen sat. >92%  f/u CXR  Pulm. Dr. Loredo consulted, appre crecs    #Intractable Vomiting  pt p/w dark colored emesis  concern for possible SBO  Keep NPO for now  continue IV protonix 40 mg daily  f/u CT A/P to r/o SBO    #ESRD on dialysis.   s/p tele as hyperkalemia is now resolved  last HD completed on 7/29  Renal diet  Dr. Mosher, Nephrology, following  Recommendations appreciated.    #CHF  dc po lasix and metolazone, pt is anuric    #Delirium   started on zoloft 50 mg qhs  PRN: Haldol 0.5 mg IM/IV q 8 hrs PRN for agitation  Monitor his QTc  Psych followed    #Hyperphosphatemia.   ·  Plan: In the setting of ESRD  Continue Sevelamer  Trend Phos  resolved  Dr. Mosher, Nephrology, following    #HTN (hypertension).   ·  Plan: Controlled  Takes Hydralazine, Metoprolol and Norvasc at home  will consider IV hydralazine for systolic BP >170  BP goal <140/90  Continue home regimen   Monitor BP  DASH diet.    #DM (diabetes mellitus).   ·  Plan: Controlled in the community as evidenced by A1c 6.1  Takes 4U Lantus QHS at home  Frustrated with frequent interruptions and refusing care  Continue home Lantus regimen  Finger stick QHS  Check glucose with AM labs.    #HLD (hyperlipidemia).   ·  Plan: Continue with home dose statin.    # Anemia due to end stage renal disease.   ·  Plan: In the setting of HD  Hgb 7.0, consider transfusion with next HD session  Continue  Epogen with dialysis  Continue Iron tabs.    #Prophylactic measure.   ·  Plan: DVT PPX: Heparin. Pt is aox3, unable to eat due to ongoing nausea and vomiting, with dark colored emesis, concern for possible SBO.   Pt also reports abdominal pain, upon physical exam pt has abdominal distention with RUQ and Umbilical Tenderness. Pt last bowel movement was 2 days ago.   Additionally pt remains on 4L NC, also concerning for possible AHRF 2/2 Possible Aspiration PNA vs worsening Pulmonary Edema. Awaiting chest xray results.     medications also reviewed:  lasix 80 mg po and metolazone was dc in setting of anuria    Discussed with Attending Dr. De La Torre, will plan pt for CT A/P.    REVIEW OF SYSTEMS:  CONSTITUTIONAL: No fever, chills  ENMT:  No difficulty hearing, no change in vision  NECK: No pain or stiffness  RESPIRATORY: No cough, SOB  CARDIOVASCULAR: No chest pain, palpitations  GASTROINTESTINAL: +nausea, vomiting, abdominal pain.   GENITOURINARY: No dysuria  NEUROLOGICAL: No HA  SKIN: No itching, burning, rashes, or lesions   LYMPH NODES: No enlarged glands  ENDOCRINE: No heat or cold intolerance; No hair loss  MUSCULOSKELETAL: No joint pain or swelling; No muscle, back, or extremity pain  PSYCHIATRIC: No depression, anxiety  HEME/LYMPH: No easy bruising, or bleeding gums    T(C): 37 (07-30-23 @ 12:33), Max: 37.2 (07-30-23 @ 08:05)  HR: 80 (07-30-23 @ 12:33) (80 - 102)  BP: 189/86 (07-30-23 @ 12:33) (112/72 - 189/86)  RR: 18 (07-30-23 @ 12:33) (18 - 22)  SpO2: 98% (07-30-23 @ 12:33) (93% - 98%)  Wt(kg): --Vital Signs Last 24 Hrs  T(C): 37 (30 Jul 2023 12:33), Max: 37.2 (30 Jul 2023 08:05)  T(F): 98.6 (30 Jul 2023 12:33), Max: 99 (30 Jul 2023 08:05)  HR: 80 (30 Jul 2023 12:33) (80 - 102)  BP: 189/86 (30 Jul 2023 12:33) (112/72 - 189/86)  BP(mean): --  RR: 18 (30 Jul 2023 12:33) (18 - 22)  SpO2: 98% (30 Jul 2023 12:33) (93% - 98%)    Parameters below as of 30 Jul 2023 12:33  Patient On (Oxygen Delivery Method): nasal cannula  O2 Flow (L/min): 4      MEDICATIONS  (STANDING):  albuterol/ipratropium for Nebulization 3 milliLiter(s) Nebulizer every 6 hours  amLODIPine   Tablet 10 milliGRAM(s) Oral daily  aspirin  chewable 81 milliGRAM(s) Oral daily  atorvastatin 40 milliGRAM(s) Oral at bedtime  budesonide 160 MICROgram(s)/formoterol 4.5 MICROgram(s) Inhaler 2 Puff(s) Inhalation two times a day  dextrose 5%. 1000 milliLiter(s) (50 mL/Hr) IV Continuous <Continuous>  dextrose 5%. 1000 milliLiter(s) (100 mL/Hr) IV Continuous <Continuous>  dextrose 50% Injectable 25 Gram(s) IV Push once  dextrose 50% Injectable 25 Gram(s) IV Push once  dextrose 50% Injectable 12.5 Gram(s) IV Push once  epoetin beverley (PROCRIT) Injectable 94084 Unit(s) IV Push <User Schedule>  folic acid 1 milliGRAM(s) Oral daily  glucagon  Injectable 1 milliGRAM(s) IntraMuscular once  heparin   Injectable 5000 Unit(s) SubCutaneous every 12 hours  hydrALAZINE 25 milliGRAM(s) Oral three times a day  insulin glargine Injectable (LANTUS) 4 Unit(s) SubCutaneous at bedtime  latanoprost 0.005% Ophthalmic Solution 1 Drop(s) Both EYES at bedtime  levothyroxine 25 MICROGram(s) Oral daily  LORazepam     Tablet 0.5 milliGRAM(s) Oral <User Schedule>  metolazone 10 milliGRAM(s) Oral daily  metoprolol succinate ER 50 milliGRAM(s) Oral daily  oxycodone    5 mG/acetaminophen 325 mG 1 Tablet(s) Oral every 6 hours  pantoprazole  Injectable 40 milliGRAM(s) IV Push every 12 hours  sertraline 50 milliGRAM(s) Oral daily  sucralfate 1 Gram(s) Oral four times a day    MEDICATIONS  (PRN):  dextrose Oral Gel 15 Gram(s) Oral once PRN Blood Glucose LESS THAN 70 milliGRAM(s)/deciliter  haloperidol    Injectable 0.5 milliGRAM(s) IntraMuscular every 8 hours PRN Agitation  ondansetron Injectable 4 milliGRAM(s) IV Push every 6 hours PRN Nausea and/or Vomiting      FOCUSED PHYSICAL EXAM:  GENERAL: lethargic, uncomfortable appearing  EYES: clear conjunctiva  ENMT: Moist mucous membranes  NECK: Supple, No JVD, Normal thyroid  CHEST/LUNG: +nasal cannula. Clear to auscultation bilaterally; No rales, rhonchi, wheezing, or rubs  HEART: S1, S2, Regular rate and rhythm  ABDOMEN: Rigid, RUQ/umbilical tenderness, +distended; Bowel sounds present  NEURO: Alert & Oriented X3  EXTREMITIES: +left BKA. No LE edema, no calf tenderness  LYMPH: No lymphadenopathy noted  SKIN: No rashes or lesions    LABS:                        8.0    4.87  )-----------( 71       ( 30 Jul 2023 03:07 )             25.1     07-30    141  |  102  |  71<H>  ----------------------------<  92  3.9   |  25  |  11.30<H>    Ca    7.9<L>      30 Jul 2023 03:07  Phos  3.7     07-30  Mg     2.6     07-30    TPro  6.6  /  Alb  3.1<L>  /  TBili  0.7  /  DBili  x   /  AST  26  /  ALT  41  /  AlkPhos  97  07-30      CAPILLARY BLOOD GLUCOSE      POCT Blood Glucose.: 78 mg/dL (30 Jul 2023 12:10)  POCT Blood Glucose.: 77 mg/dL (30 Jul 2023 07:47)  POCT Blood Glucose.: 92 mg/dL (30 Jul 2023 03:04)  POCT Blood Glucose.: 132 mg/dL (29 Jul 2023 20:42)  POCT Blood Glucose.: 156 mg/dL (29 Jul 2023 16:37)        Urinalysis Basic - ( 30 Jul 2023 03:07 )    Color: x / Appearance: x / SG: x / pH: x  Gluc: 92 mg/dL / Ketone: x  / Bili: x / Urobili: x   Blood: x / Protein: x / Nitrite: x   Leuk Esterase: x / RBC: x / WBC x   Sq Epi: x / Non Sq Epi: x / Bacteria: x        ASSESSMENT:    62 year old male from [facility], walks with RW, with PMH of ESRD on HD (TTS), BKA- L w/prosthetic leg, DM, Left eye blindness, CHF, CAD, HTN, HLD, osteoporosis, bronchitis, current smoker, and anemia who presents with complaint of missed dialysis. Last HD 7/22. Pt states he often missed HD sessions.   Pt complains of right leg swelling and states he does not make any urine. Patient states he was having mild shortness of breath. Patient admitted to tele for missed dialysis found to have pulmonary edema and BNP [id number]. Cardiology and nephro following.     #Acute Hypoxic Respiratory Failure  possible in setting of pulmonary edema vs aspiration pneumonia vs COPD  continue 4L oxygen via nasal cannula  c/w duonebs q6h for now  maintain oxygen sat. >92%  f/u CXR  Pulm. Dr. Loredo consulted, appre crecs    #Intractable Vomiting  pt p/w dark colored emesis  concern for possible SBO  Keep NPO for now  continue IV protonix 40 mg daily  add D5 NS 30 cc/hr  f/u CT A/P to r/o SBO    #ESRD on dialysis.   s/p tele as hyperkalemia is now resolved  last HD completed on 7/29  Renal diet  Dr. Mosher, Nephrology, following  Recommendations appreciated.    #CHF  dc po lasix and metolazone, pt is anuric    #Delirium   started on zoloft 50 mg qhs  PRN: Haldol 0.5 mg IM/IV q 8 hrs PRN for agitation  Monitor his QTc  Psych followed    #Hyperphosphatemia.   In the setting of ESRD  Continue Sevelamer  Trend Phos  resolved  Dr. Mosher, Nephrology, following    #HTN (hypertension).   Takes Hydralazine, Metoprolol and Norvasc at home  will consider IV hydralazine for systolic BP >170  BP goal <140/90  Continue home regimen   Monitor BP  DASH diet.    #DM (diabetes mellitus).   Controlled in the community as evidenced by A1c 6.1  Takes 4U Lantus QHS at home  Frustrated with frequent interruptions and refusing care  Continue home Lantus regimen  Finger stick QHS  Check glucose with AM labs.    #HLD (hyperlipidemia).   ·  Plan: Continue with home dose statin.    # Anemia due to end stage renal disease.   In the setting of HD  Hgb 7.0, consider transfusion with next HD session  Continue  Epogen with dialysis  Continue Iron tabs.    #Prophylactic measure.   DVT PPX: Heparin. Pt is aox3, unable to eat due to ongoing nausea and vomiting, with dark colored emesis, concern for possible SBO.   Pt also reports abdominal pain, upon physical exam pt has abdominal distention with RUQ and Umbilical Tenderness. Pt last bowel movement was 2 days ago.   Additionally pt remains on 4L NC, also concerning for possible AHRF 2/2 Possible Aspiration PNA vs worsening Pulmonary Edema. Awaiting chest xray results.     medications also reviewed:  lasix 80 mg po and metolazone was dc in setting of anuria    Discussed with Attending Dr. De La Torre, will plan pt for CT A/P.    REVIEW OF SYSTEMS:  CONSTITUTIONAL: No fever, chills  ENMT:  No difficulty hearing, no change in vision  NECK: No pain or stiffness  RESPIRATORY: No cough, SOB  CARDIOVASCULAR: No chest pain, palpitations  GASTROINTESTINAL: +nausea, vomiting, abdominal pain.   GENITOURINARY: No dysuria  NEUROLOGICAL: No HA  SKIN: No itching, burning, rashes, or lesions   LYMPH NODES: No enlarged glands  ENDOCRINE: No heat or cold intolerance; No hair loss  MUSCULOSKELETAL: No joint pain or swelling; No muscle, back, or extremity pain  PSYCHIATRIC: No depression, anxiety  HEME/LYMPH: No easy bruising, or bleeding gums    T(C): 37 (07-30-23 @ 12:33), Max: 37.2 (07-30-23 @ 08:05)  HR: 80 (07-30-23 @ 12:33) (80 - 102)  BP: 189/86 (07-30-23 @ 12:33) (112/72 - 189/86)  RR: 18 (07-30-23 @ 12:33) (18 - 22)  SpO2: 98% (07-30-23 @ 12:33) (93% - 98%)  Wt(kg): --Vital Signs Last 24 Hrs  T(C): 37 (30 Jul 2023 12:33), Max: 37.2 (30 Jul 2023 08:05)  T(F): 98.6 (30 Jul 2023 12:33), Max: 99 (30 Jul 2023 08:05)  HR: 80 (30 Jul 2023 12:33) (80 - 102)  BP: 189/86 (30 Jul 2023 12:33) (112/72 - 189/86)  BP(mean): --  RR: 18 (30 Jul 2023 12:33) (18 - 22)  SpO2: 98% (30 Jul 2023 12:33) (93% - 98%)    Parameters below as of 30 Jul 2023 12:33  Patient On (Oxygen Delivery Method): nasal cannula  O2 Flow (L/min): 4      MEDICATIONS  (STANDING):  albuterol/ipratropium for Nebulization 3 milliLiter(s) Nebulizer every 6 hours  amLODIPine   Tablet 10 milliGRAM(s) Oral daily  aspirin  chewable 81 milliGRAM(s) Oral daily  atorvastatin 40 milliGRAM(s) Oral at bedtime  budesonide 160 MICROgram(s)/formoterol 4.5 MICROgram(s) Inhaler 2 Puff(s) Inhalation two times a day  dextrose 5%. 1000 milliLiter(s) (50 mL/Hr) IV Continuous <Continuous>  dextrose 5%. 1000 milliLiter(s) (100 mL/Hr) IV Continuous <Continuous>  dextrose 50% Injectable 25 Gram(s) IV Push once  dextrose 50% Injectable 25 Gram(s) IV Push once  dextrose 50% Injectable 12.5 Gram(s) IV Push once  epoetin beverley (PROCRIT) Injectable 56452 Unit(s) IV Push <User Schedule>  folic acid 1 milliGRAM(s) Oral daily  glucagon  Injectable 1 milliGRAM(s) IntraMuscular once  heparin   Injectable 5000 Unit(s) SubCutaneous every 12 hours  hydrALAZINE 25 milliGRAM(s) Oral three times a day  insulin glargine Injectable (LANTUS) 4 Unit(s) SubCutaneous at bedtime  latanoprost 0.005% Ophthalmic Solution 1 Drop(s) Both EYES at bedtime  levothyroxine 25 MICROGram(s) Oral daily  LORazepam     Tablet 0.5 milliGRAM(s) Oral <User Schedule>  metolazone 10 milliGRAM(s) Oral daily  metoprolol succinate ER 50 milliGRAM(s) Oral daily  oxycodone    5 mG/acetaminophen 325 mG 1 Tablet(s) Oral every 6 hours  pantoprazole  Injectable 40 milliGRAM(s) IV Push every 12 hours  sertraline 50 milliGRAM(s) Oral daily  sucralfate 1 Gram(s) Oral four times a day    MEDICATIONS  (PRN):  dextrose Oral Gel 15 Gram(s) Oral once PRN Blood Glucose LESS THAN 70 milliGRAM(s)/deciliter  haloperidol    Injectable 0.5 milliGRAM(s) IntraMuscular every 8 hours PRN Agitation  ondansetron Injectable 4 milliGRAM(s) IV Push every 6 hours PRN Nausea and/or Vomiting      FOCUSED PHYSICAL EXAM:  GENERAL: lethargic, uncomfortable appearing  EYES: clear conjunctiva  ENMT: Moist mucous membranes  NECK: Supple, No JVD, Normal thyroid  CHEST/LUNG: +nasal cannula. Clear to auscultation bilaterally; No rales, rhonchi, wheezing, or rubs  HEART: S1, S2, Regular rate and rhythm  ABDOMEN: Rigid, RUQ/umbilical tenderness, +distended; Bowel sounds present  NEURO: Alert & Oriented X3  EXTREMITIES: +left BKA. No LE edema, no calf tenderness  LYMPH: No lymphadenopathy noted  SKIN: No rashes or lesions    LABS:                        8.0    4.87  )-----------( 71       ( 30 Jul 2023 03:07 )             25.1     07-30    141  |  102  |  71<H>  ----------------------------<  92  3.9   |  25  |  11.30<H>    Ca    7.9<L>      30 Jul 2023 03:07  Phos  3.7     07-30  Mg     2.6     07-30    TPro  6.6  /  Alb  3.1<L>  /  TBili  0.7  /  DBili  x   /  AST  26  /  ALT  41  /  AlkPhos  97  07-30      CAPILLARY BLOOD GLUCOSE      POCT Blood Glucose.: 78 mg/dL (30 Jul 2023 12:10)  POCT Blood Glucose.: 77 mg/dL (30 Jul 2023 07:47)  POCT Blood Glucose.: 92 mg/dL (30 Jul 2023 03:04)  POCT Blood Glucose.: 132 mg/dL (29 Jul 2023 20:42)  POCT Blood Glucose.: 156 mg/dL (29 Jul 2023 16:37)        Urinalysis Basic - ( 30 Jul 2023 03:07 )    Color: x / Appearance: x / SG: x / pH: x  Gluc: 92 mg/dL / Ketone: x  / Bili: x / Urobili: x   Blood: x / Protein: x / Nitrite: x   Leuk Esterase: x / RBC: x / WBC x   Sq Epi: x / Non Sq Epi: x / Bacteria: x        ASSESSMENT:    62 year old male from [facility], walks with RW, with PMH of ESRD on HD (TTS), BKA- L w/prosthetic leg, DM, Left eye blindness, CHF, CAD, HTN, HLD, osteoporosis, bronchitis, current smoker, and anemia who presents with complaint of missed dialysis. Last HD 7/22. Pt states he often missed HD sessions.   Pt complains of right leg swelling and states he does not make any urine. Patient states he was having mild shortness of breath. Patient admitted to tele for missed dialysis found to have pulmonary edema and BNP [id number]. Cardiology and nephro following.     #Acute Hypoxic Respiratory Failure  possible in setting of pulmonary edema vs aspiration pneumonia vs COPD  continue 4L oxygen via nasal cannula  c/w duonebs q6h for now  maintain oxygen sat. >92%  f/u CXR  Pulm. Dr. Loredo consulted, appre crecs    #Intractable Vomiting  pt p/w dark colored emesis  concern for possible SBO  Keep NPO for now, hold off on all PO meds  continue IV protonix 40 mg daily  add D5 NS 30 cc/hr  f/u CT A/P to r/o SBO    #ESRD on dialysis.   s/p tele as hyperkalemia is now resolved  last HD completed on 7/29  Renal diet  Dr. Mosher, Nephrology, following  Recommendations appreciated.    #CHF  dc po lasix and metolazone, pt is anuric    #Delirium   started on zoloft 50 mg qhs  PRN: Haldol 0.5 mg IM/IV q 8 hrs PRN for agitation  Monitor his QTc  Psych followed    #Hyperphosphatemia.   In the setting of ESRD  Continue Sevelamer  Trend Phos  resolved  Dr. Mosher, Nephrology, following    #HTN (hypertension).   Takes Hydralazine, Metoprolol and Norvasc at home  will consider IV hydralazine for systolic BP >170  BP goal <140/90  Continue home regimen   Monitor BP  DASH diet.    #DM (diabetes mellitus).   Controlled in the community as evidenced by A1c 6.1  Takes 4U Lantus QHS at home  Frustrated with frequent interruptions and refusing care  Continue home Lantus regimen  Finger stick QHS  Check glucose with AM labs.    #HLD (hyperlipidemia).   ·  Plan: Continue with home dose statin.    # Anemia due to end stage renal disease.   In the setting of HD  Hgb 7.0, consider transfusion with next HD session  Continue  Epogen with dialysis  Continue Iron tabs.    #Prophylactic measure.   DVT PPX: Heparin. Pt is aox3, unable to eat due to ongoing nausea and vomiting, with dark colored emesis, concern for possible SBO.   Pt also reports abdominal pain, upon physical exam pt has abdominal distention with RUQ and Umbilical Tenderness. Pt last bowel movement was 2 days ago.   Additionally pt remains on 4L NC, also concerning for possible AHRF 2/2 Possible Aspiration PNA vs worsening Pulmonary Edema. Awaiting chest xray results.     medications also reviewed:  lasix 80 mg po and metolazone was dc in setting of anuria    Discussed with Attending Dr. De La Torre, will plan pt for CT A/P.    REVIEW OF SYSTEMS:  CONSTITUTIONAL: No fever, chills  ENMT:  No difficulty hearing, no change in vision  NECK: No pain or stiffness  RESPIRATORY: No cough, SOB  CARDIOVASCULAR: No chest pain, palpitations  GASTROINTESTINAL: +nausea, vomiting, abdominal pain.   GENITOURINARY: No dysuria  NEUROLOGICAL: No HA  SKIN: No itching, burning, rashes, or lesions   LYMPH NODES: No enlarged glands  ENDOCRINE: No heat or cold intolerance; No hair loss  MUSCULOSKELETAL: No joint pain or swelling; No muscle, back, or extremity pain  PSYCHIATRIC: No depression, anxiety  HEME/LYMPH: No easy bruising, or bleeding gums    T(C): 37 (07-30-23 @ 12:33), Max: 37.2 (07-30-23 @ 08:05)  HR: 80 (07-30-23 @ 12:33) (80 - 102)  BP: 189/86 (07-30-23 @ 12:33) (112/72 - 189/86)  RR: 18 (07-30-23 @ 12:33) (18 - 22)  SpO2: 98% (07-30-23 @ 12:33) (93% - 98%)  Wt(kg): --Vital Signs Last 24 Hrs  T(C): 37 (30 Jul 2023 12:33), Max: 37.2 (30 Jul 2023 08:05)  T(F): 98.6 (30 Jul 2023 12:33), Max: 99 (30 Jul 2023 08:05)  HR: 80 (30 Jul 2023 12:33) (80 - 102)  BP: 189/86 (30 Jul 2023 12:33) (112/72 - 189/86)  BP(mean): --  RR: 18 (30 Jul 2023 12:33) (18 - 22)  SpO2: 98% (30 Jul 2023 12:33) (93% - 98%)    Parameters below as of 30 Jul 2023 12:33  Patient On (Oxygen Delivery Method): nasal cannula  O2 Flow (L/min): 4      MEDICATIONS  (STANDING):  albuterol/ipratropium for Nebulization 3 milliLiter(s) Nebulizer every 6 hours  amLODIPine   Tablet 10 milliGRAM(s) Oral daily  aspirin  chewable 81 milliGRAM(s) Oral daily  atorvastatin 40 milliGRAM(s) Oral at bedtime  budesonide 160 MICROgram(s)/formoterol 4.5 MICROgram(s) Inhaler 2 Puff(s) Inhalation two times a day  dextrose 5%. 1000 milliLiter(s) (50 mL/Hr) IV Continuous <Continuous>  dextrose 5%. 1000 milliLiter(s) (100 mL/Hr) IV Continuous <Continuous>  dextrose 50% Injectable 25 Gram(s) IV Push once  dextrose 50% Injectable 25 Gram(s) IV Push once  dextrose 50% Injectable 12.5 Gram(s) IV Push once  epoetin beverley (PROCRIT) Injectable 17563 Unit(s) IV Push <User Schedule>  folic acid 1 milliGRAM(s) Oral daily  glucagon  Injectable 1 milliGRAM(s) IntraMuscular once  heparin   Injectable 5000 Unit(s) SubCutaneous every 12 hours  hydrALAZINE 25 milliGRAM(s) Oral three times a day  insulin glargine Injectable (LANTUS) 4 Unit(s) SubCutaneous at bedtime  latanoprost 0.005% Ophthalmic Solution 1 Drop(s) Both EYES at bedtime  levothyroxine 25 MICROGram(s) Oral daily  LORazepam     Tablet 0.5 milliGRAM(s) Oral <User Schedule>  metolazone 10 milliGRAM(s) Oral daily  metoprolol succinate ER 50 milliGRAM(s) Oral daily  oxycodone    5 mG/acetaminophen 325 mG 1 Tablet(s) Oral every 6 hours  pantoprazole  Injectable 40 milliGRAM(s) IV Push every 12 hours  sertraline 50 milliGRAM(s) Oral daily  sucralfate 1 Gram(s) Oral four times a day    MEDICATIONS  (PRN):  dextrose Oral Gel 15 Gram(s) Oral once PRN Blood Glucose LESS THAN 70 milliGRAM(s)/deciliter  haloperidol    Injectable 0.5 milliGRAM(s) IntraMuscular every 8 hours PRN Agitation  ondansetron Injectable 4 milliGRAM(s) IV Push every 6 hours PRN Nausea and/or Vomiting      FOCUSED PHYSICAL EXAM:  GENERAL: lethargic, uncomfortable appearing  EYES: clear conjunctiva  ENMT: Moist mucous membranes  NECK: Supple, No JVD, Normal thyroid  CHEST/LUNG: +nasal cannula. Clear to auscultation bilaterally; No rales, rhonchi, wheezing, or rubs  HEART: S1, S2, Regular rate and rhythm  ABDOMEN: Rigid, RUQ/umbilical tenderness, +distended; Bowel sounds present  NEURO: Alert & Oriented X3  EXTREMITIES: +left BKA. No LE edema, no calf tenderness  LYMPH: No lymphadenopathy noted  SKIN: No rashes or lesions    LABS:                        8.0    4.87  )-----------( 71       ( 30 Jul 2023 03:07 )             25.1     07-30    141  |  102  |  71<H>  ----------------------------<  92  3.9   |  25  |  11.30<H>    Ca    7.9<L>      30 Jul 2023 03:07  Phos  3.7     07-30  Mg     2.6     07-30    TPro  6.6  /  Alb  3.1<L>  /  TBili  0.7  /  DBili  x   /  AST  26  /  ALT  41  /  AlkPhos  97  07-30      CAPILLARY BLOOD GLUCOSE      POCT Blood Glucose.: 78 mg/dL (30 Jul 2023 12:10)  POCT Blood Glucose.: 77 mg/dL (30 Jul 2023 07:47)  POCT Blood Glucose.: 92 mg/dL (30 Jul 2023 03:04)  POCT Blood Glucose.: 132 mg/dL (29 Jul 2023 20:42)  POCT Blood Glucose.: 156 mg/dL (29 Jul 2023 16:37)        Urinalysis Basic - ( 30 Jul 2023 03:07 )    Color: x / Appearance: x / SG: x / pH: x  Gluc: 92 mg/dL / Ketone: x  / Bili: x / Urobili: x   Blood: x / Protein: x / Nitrite: x   Leuk Esterase: x / RBC: x / WBC x   Sq Epi: x / Non Sq Epi: x / Bacteria: x        ASSESSMENT:    62 year old male from [facility], walks with RW, with PMH of ESRD on HD (TTS), BKA- L w/prosthetic leg, DM, Left eye blindness, CHF, CAD, HTN, HLD, osteoporosis, bronchitis, current smoker, and anemia who presents with complaint of missed dialysis. Last HD 7/22. Pt states he often missed HD sessions.   Pt complains of right leg swelling and states he does not make any urine. Patient states he was having mild shortness of breath. Patient admitted to tele for missed dialysis found to have pulmonary edema and BNP [id number]. Cardiology and nephro following.     #Acute Hypoxic Respiratory Failure  possible in setting of pulmonary edema vs aspiration pneumonia vs COPD  continue 4L oxygen via nasal cannula  c/w duonebs q6h for now  maintain oxygen sat. >92%  f/u CXR  Pulm. Dr. Loredo consulted, appre crecs    #Intractable Vomiting  pt p/w dark colored emesis  concern for possible SBO  Keep NPO for now, hold off on all PO meds  continue IV protonix 40 mg daily  add D5 1/2NS 30 cc/hr  f/u CT A/P to r/o SBO    #ESRD on dialysis.   s/p tele as hyperkalemia is now resolved  last HD completed on 7/29  Renal diet  Dr. Mosher, Nephrology, following  Recommendations appreciated.    #CHF  dc po lasix and metolazone, pt is anuric    #Delirium   started on zoloft 50 mg qhs  PRN: Haldol 0.5 mg IM/IV q 8 hrs PRN for agitation  Monitor his QTc  Psych followed    #Hyperphosphatemia.   In the setting of ESRD  Continue Sevelamer  Trend Phos  resolved  Dr. Mosher, Nephrology, following    #HTN (hypertension).   Takes Hydralazine, Metoprolol and Norvasc at home  will consider IV hydralazine for systolic BP >170  BP goal <140/90  Continue home regimen   Monitor BP  DASH diet.    #DM (diabetes mellitus).   Controlled in the community as evidenced by A1c 6.1  Takes 4U Lantus QHS at home  Frustrated with frequent interruptions and refusing care  Continue home Lantus regimen  Finger stick QHS  Check glucose with AM labs.    #HLD (hyperlipidemia).   ·  Plan: Continue with home dose statin.    # Anemia due to end stage renal disease.   In the setting of HD  Hgb 7.0, consider transfusion with next HD session  Continue  Epogen with dialysis  Continue Iron tabs.    #Prophylactic measure.   DVT PPX: Heparin. Pt is aox3, unable to eat due to ongoing nausea and vomiting, with dark colored emesis, concern for possible SBO.   Pt also reports abdominal pain, upon physical exam pt has abdominal distention with RUQ and Umbilical Tenderness.   Pt last bowel movement was 2 days ago. Discussed with patient for possible SBO, does not want NGT tube.   Additionally pt remains on 4L NC, also concerning for possible AHRF 2/2 Possible Aspiration PNA vs worsening Pulmonary Edema. Awaiting chest xray results.     medications also reviewed:  lasix 80 mg po and metolazone was dc in setting of anuria    Discussed with Attending Dr. De La Torre, will plan pt for CT A/P.    REVIEW OF SYSTEMS:  CONSTITUTIONAL: No fever, chills  ENMT:  No difficulty hearing, no change in vision  NECK: No pain or stiffness  RESPIRATORY: No cough, SOB  CARDIOVASCULAR: No chest pain, palpitations  GASTROINTESTINAL: +nausea, vomiting, abdominal pain.   GENITOURINARY: No dysuria  NEUROLOGICAL: No HA  SKIN: No itching, burning, rashes, or lesions   LYMPH NODES: No enlarged glands  ENDOCRINE: No heat or cold intolerance; No hair loss  MUSCULOSKELETAL: No joint pain or swelling; No muscle, back, or extremity pain  PSYCHIATRIC: No depression, anxiety  HEME/LYMPH: No easy bruising, or bleeding gums    T(C): 37 (07-30-23 @ 12:33), Max: 37.2 (07-30-23 @ 08:05)  HR: 80 (07-30-23 @ 12:33) (80 - 102)  BP: 189/86 (07-30-23 @ 12:33) (112/72 - 189/86)  RR: 18 (07-30-23 @ 12:33) (18 - 22)  SpO2: 98% (07-30-23 @ 12:33) (93% - 98%)  Wt(kg): --Vital Signs Last 24 Hrs  T(C): 37 (30 Jul 2023 12:33), Max: 37.2 (30 Jul 2023 08:05)  T(F): 98.6 (30 Jul 2023 12:33), Max: 99 (30 Jul 2023 08:05)  HR: 80 (30 Jul 2023 12:33) (80 - 102)  BP: 189/86 (30 Jul 2023 12:33) (112/72 - 189/86)  BP(mean): --  RR: 18 (30 Jul 2023 12:33) (18 - 22)  SpO2: 98% (30 Jul 2023 12:33) (93% - 98%)    Parameters below as of 30 Jul 2023 12:33  Patient On (Oxygen Delivery Method): nasal cannula  O2 Flow (L/min): 4      MEDICATIONS  (STANDING):  albuterol/ipratropium for Nebulization 3 milliLiter(s) Nebulizer every 6 hours  amLODIPine   Tablet 10 milliGRAM(s) Oral daily  aspirin  chewable 81 milliGRAM(s) Oral daily  atorvastatin 40 milliGRAM(s) Oral at bedtime  budesonide 160 MICROgram(s)/formoterol 4.5 MICROgram(s) Inhaler 2 Puff(s) Inhalation two times a day  dextrose 5%. 1000 milliLiter(s) (50 mL/Hr) IV Continuous <Continuous>  dextrose 5%. 1000 milliLiter(s) (100 mL/Hr) IV Continuous <Continuous>  dextrose 50% Injectable 25 Gram(s) IV Push once  dextrose 50% Injectable 25 Gram(s) IV Push once  dextrose 50% Injectable 12.5 Gram(s) IV Push once  epoetin beverley (PROCRIT) Injectable 78585 Unit(s) IV Push <User Schedule>  folic acid 1 milliGRAM(s) Oral daily  glucagon  Injectable 1 milliGRAM(s) IntraMuscular once  heparin   Injectable 5000 Unit(s) SubCutaneous every 12 hours  hydrALAZINE 25 milliGRAM(s) Oral three times a day  insulin glargine Injectable (LANTUS) 4 Unit(s) SubCutaneous at bedtime  latanoprost 0.005% Ophthalmic Solution 1 Drop(s) Both EYES at bedtime  levothyroxine 25 MICROGram(s) Oral daily  LORazepam     Tablet 0.5 milliGRAM(s) Oral <User Schedule>  metolazone 10 milliGRAM(s) Oral daily  metoprolol succinate ER 50 milliGRAM(s) Oral daily  oxycodone    5 mG/acetaminophen 325 mG 1 Tablet(s) Oral every 6 hours  pantoprazole  Injectable 40 milliGRAM(s) IV Push every 12 hours  sertraline 50 milliGRAM(s) Oral daily  sucralfate 1 Gram(s) Oral four times a day    MEDICATIONS  (PRN):  dextrose Oral Gel 15 Gram(s) Oral once PRN Blood Glucose LESS THAN 70 milliGRAM(s)/deciliter  haloperidol    Injectable 0.5 milliGRAM(s) IntraMuscular every 8 hours PRN Agitation  ondansetron Injectable 4 milliGRAM(s) IV Push every 6 hours PRN Nausea and/or Vomiting      FOCUSED PHYSICAL EXAM:  GENERAL: lethargic, uncomfortable appearing  EYES: clear conjunctiva  ENMT: Moist mucous membranes  NECK: Supple, No JVD, Normal thyroid  CHEST/LUNG: +nasal cannula. Clear to auscultation bilaterally; No rales, rhonchi, wheezing, or rubs  HEART: S1, S2, Regular rate and rhythm  ABDOMEN: Rigid, RUQ/umbilical tenderness, +distended; Bowel sounds present  NEURO: Alert & Oriented X3  EXTREMITIES: +left BKA. No LE edema, no calf tenderness  LYMPH: No lymphadenopathy noted  SKIN: No rashes or lesions    LABS:                        8.0    4.87  )-----------( 71       ( 30 Jul 2023 03:07 )             25.1     07-30    141  |  102  |  71<H>  ----------------------------<  92  3.9   |  25  |  11.30<H>    Ca    7.9<L>      30 Jul 2023 03:07  Phos  3.7     07-30  Mg     2.6     07-30    TPro  6.6  /  Alb  3.1<L>  /  TBili  0.7  /  DBili  x   /  AST  26  /  ALT  41  /  AlkPhos  97  07-30      CAPILLARY BLOOD GLUCOSE      POCT Blood Glucose.: 78 mg/dL (30 Jul 2023 12:10)  POCT Blood Glucose.: 77 mg/dL (30 Jul 2023 07:47)  POCT Blood Glucose.: 92 mg/dL (30 Jul 2023 03:04)  POCT Blood Glucose.: 132 mg/dL (29 Jul 2023 20:42)  POCT Blood Glucose.: 156 mg/dL (29 Jul 2023 16:37)        Urinalysis Basic - ( 30 Jul 2023 03:07 )    Color: x / Appearance: x / SG: x / pH: x  Gluc: 92 mg/dL / Ketone: x  / Bili: x / Urobili: x   Blood: x / Protein: x / Nitrite: x   Leuk Esterase: x / RBC: x / WBC x   Sq Epi: x / Non Sq Epi: x / Bacteria: x        ASSESSMENT:    62 year old male from [facility], walks with RW, with PMH of ESRD on HD (TTS), BKA- L w/prosthetic leg, DM, Left eye blindness, CHF, CAD, HTN, HLD, osteoporosis, bronchitis, current smoker, and anemia who presents with complaint of missed dialysis. Last HD 7/22. Pt states he often missed HD sessions.   Pt complains of right leg swelling and states he does not make any urine. Patient states he was having mild shortness of breath. Patient admitted to tele for missed dialysis found to have pulmonary edema and BNP [id number]. Cardiology and nephro following.     #Acute Hypoxic Respiratory Failure  possible in setting of pulmonary edema vs aspiration pneumonia vs COPD  continue 4L oxygen via nasal cannula  c/w duonebs q6h for now  maintain oxygen sat. >92%  f/u CXR  Pulm. Dr. Loredo consulted, appre crecs    #Intractable Vomiting  pt p/w dark colored emesis  concern for possible SBO  Keep NPO for now, hold off on all PO meds  continue IV protonix 40 mg daily  add D5 1/2NS 30 cc/hr  f/u CT A/P to r/o SBO  pt is refusing NGT    #ESRD on dialysis.   s/p tele as hyperkalemia is now resolved  last HD completed on 7/29  Renal diet  Dr. Mosher, Nephrology, following  Recommendations appreciated.    #CHF  dc po lasix and metolazone, pt is anuric    #Delirium   started on zoloft 50 mg qhs  PRN: Haldol 0.5 mg IM/IV q 8 hrs PRN for agitation  Monitor his QTc  Psych followed    #Hyperphosphatemia.   In the setting of ESRD  Continue Sevelamer  Trend Phos  resolved  Dr. Mosher, Nephrology, following    #HTN (hypertension).   Takes Hydralazine, Metoprolol and Norvasc at home  will consider IV hydralazine for systolic BP >170  BP goal <140/90  Continue home regimen   Monitor BP  DASH diet.    #DM (diabetes mellitus).   Controlled in the community as evidenced by A1c 6.1  Takes 4U Lantus QHS at home  Frustrated with frequent interruptions and refusing care  Continue home Lantus regimen  Finger stick QHS  Check glucose with AM labs.    #HLD (hyperlipidemia).   ·  Plan: Continue with home dose statin.    # Anemia due to end stage renal disease.   In the setting of HD  Hgb 7.0, consider transfusion with next HD session  Continue  Epogen with dialysis  Continue Iron tabs.    #Prophylactic measure.   DVT PPX: Heparin. Pt is aox3, unable to eat due to ongoing nausea and vomiting, with dark colored emesis, concern for possible SBO.   Pt also reports abdominal pain, upon physical exam pt has abdominal distention with RUQ and Umbilical Tenderness.   Pt last bowel movement was 2 days ago. Discussed with patient for possible SBO, does not want NGT tube.   Repeat CT A/P was negative for SBO.   Additionally pt remains on 4L NC, also concerning for possible AHRF 2/2 Possible Aspiration PNA vs worsening Pulmonary Edema. Awaiting chest xray results.     medications also reviewed:  lasix 80 mg po and metolazone was dc in setting of anuria    Discussed with Attending Dr. De La Torre, will plan pt for CT A/P.    REVIEW OF SYSTEMS:  CONSTITUTIONAL: No fever, chills  ENMT:  No difficulty hearing, no change in vision  NECK: No pain or stiffness  RESPIRATORY: No cough, SOB  CARDIOVASCULAR: No chest pain, palpitations  GASTROINTESTINAL: +nausea, vomiting, abdominal pain.   GENITOURINARY: No dysuria  NEUROLOGICAL: No HA  SKIN: No itching, burning, rashes, or lesions   LYMPH NODES: No enlarged glands  ENDOCRINE: No heat or cold intolerance; No hair loss  MUSCULOSKELETAL: No joint pain or swelling; No muscle, back, or extremity pain  PSYCHIATRIC: No depression, anxiety  HEME/LYMPH: No easy bruising, or bleeding gums    T(C): 37 (07-30-23 @ 12:33), Max: 37.2 (07-30-23 @ 08:05)  HR: 80 (07-30-23 @ 12:33) (80 - 102)  BP: 189/86 (07-30-23 @ 12:33) (112/72 - 189/86)  RR: 18 (07-30-23 @ 12:33) (18 - 22)  SpO2: 98% (07-30-23 @ 12:33) (93% - 98%)  Wt(kg): --Vital Signs Last 24 Hrs  T(C): 37 (30 Jul 2023 12:33), Max: 37.2 (30 Jul 2023 08:05)  T(F): 98.6 (30 Jul 2023 12:33), Max: 99 (30 Jul 2023 08:05)  HR: 80 (30 Jul 2023 12:33) (80 - 102)  BP: 189/86 (30 Jul 2023 12:33) (112/72 - 189/86)  BP(mean): --  RR: 18 (30 Jul 2023 12:33) (18 - 22)  SpO2: 98% (30 Jul 2023 12:33) (93% - 98%)    Parameters below as of 30 Jul 2023 12:33  Patient On (Oxygen Delivery Method): nasal cannula  O2 Flow (L/min): 4      MEDICATIONS  (STANDING):  albuterol/ipratropium for Nebulization 3 milliLiter(s) Nebulizer every 6 hours  amLODIPine   Tablet 10 milliGRAM(s) Oral daily  aspirin  chewable 81 milliGRAM(s) Oral daily  atorvastatin 40 milliGRAM(s) Oral at bedtime  budesonide 160 MICROgram(s)/formoterol 4.5 MICROgram(s) Inhaler 2 Puff(s) Inhalation two times a day  dextrose 5%. 1000 milliLiter(s) (50 mL/Hr) IV Continuous <Continuous>  dextrose 5%. 1000 milliLiter(s) (100 mL/Hr) IV Continuous <Continuous>  dextrose 50% Injectable 25 Gram(s) IV Push once  dextrose 50% Injectable 25 Gram(s) IV Push once  dextrose 50% Injectable 12.5 Gram(s) IV Push once  epoetin beverley (PROCRIT) Injectable 02863 Unit(s) IV Push <User Schedule>  folic acid 1 milliGRAM(s) Oral daily  glucagon  Injectable 1 milliGRAM(s) IntraMuscular once  heparin   Injectable 5000 Unit(s) SubCutaneous every 12 hours  hydrALAZINE 25 milliGRAM(s) Oral three times a day  insulin glargine Injectable (LANTUS) 4 Unit(s) SubCutaneous at bedtime  latanoprost 0.005% Ophthalmic Solution 1 Drop(s) Both EYES at bedtime  levothyroxine 25 MICROGram(s) Oral daily  LORazepam     Tablet 0.5 milliGRAM(s) Oral <User Schedule>  metolazone 10 milliGRAM(s) Oral daily  metoprolol succinate ER 50 milliGRAM(s) Oral daily  oxycodone    5 mG/acetaminophen 325 mG 1 Tablet(s) Oral every 6 hours  pantoprazole  Injectable 40 milliGRAM(s) IV Push every 12 hours  sertraline 50 milliGRAM(s) Oral daily  sucralfate 1 Gram(s) Oral four times a day    MEDICATIONS  (PRN):  dextrose Oral Gel 15 Gram(s) Oral once PRN Blood Glucose LESS THAN 70 milliGRAM(s)/deciliter  haloperidol    Injectable 0.5 milliGRAM(s) IntraMuscular every 8 hours PRN Agitation  ondansetron Injectable 4 milliGRAM(s) IV Push every 6 hours PRN Nausea and/or Vomiting      FOCUSED PHYSICAL EXAM:  GENERAL: lethargic, uncomfortable appearing  EYES: clear conjunctiva  ENMT: Moist mucous membranes  NECK: Supple, No JVD, Normal thyroid  CHEST/LUNG: +nasal cannula. Clear to auscultation bilaterally; No rales, rhonchi, wheezing, or rubs  HEART: S1, S2, Regular rate and rhythm  ABDOMEN: Rigid, RUQ/umbilical tenderness, +distended; Bowel sounds present  NEURO: Alert & Oriented X3  EXTREMITIES: +left BKA. No LE edema, no calf tenderness  LYMPH: No lymphadenopathy noted  SKIN: No rashes or lesions    LABS:                        8.0    4.87  )-----------( 71       ( 30 Jul 2023 03:07 )             25.1     07-30    141  |  102  |  71<H>  ----------------------------<  92  3.9   |  25  |  11.30<H>    Ca    7.9<L>      30 Jul 2023 03:07  Phos  3.7     07-30  Mg     2.6     07-30    TPro  6.6  /  Alb  3.1<L>  /  TBili  0.7  /  DBili  x   /  AST  26  /  ALT  41  /  AlkPhos  97  07-30      CAPILLARY BLOOD GLUCOSE      POCT Blood Glucose.: 78 mg/dL (30 Jul 2023 12:10)  POCT Blood Glucose.: 77 mg/dL (30 Jul 2023 07:47)  POCT Blood Glucose.: 92 mg/dL (30 Jul 2023 03:04)  POCT Blood Glucose.: 132 mg/dL (29 Jul 2023 20:42)  POCT Blood Glucose.: 156 mg/dL (29 Jul 2023 16:37)        Urinalysis Basic - ( 30 Jul 2023 03:07 )    Color: x / Appearance: x / SG: x / pH: x  Gluc: 92 mg/dL / Ketone: x  / Bili: x / Urobili: x   Blood: x / Protein: x / Nitrite: x   Leuk Esterase: x / RBC: x / WBC x   Sq Epi: x / Non Sq Epi: x / Bacteria: x        ASSESSMENT:    62 year old male from [facility], walks with RW, with PMH of ESRD on HD (TTS), BKA- L w/prosthetic leg, DM, Left eye blindness, CHF, CAD, HTN, HLD, osteoporosis, bronchitis, current smoker, and anemia who presents with complaint of missed dialysis. Last HD 7/22. Pt states he often missed HD sessions.   Pt complains of right leg swelling and states he does not make any urine. Patient states he was having mild shortness of breath. Patient admitted to tele for missed dialysis found to have pulmonary edema and BNP [id number]. Cardiology and nephro following.     #Acute Hypoxic Respiratory Failure  possible in setting of pulmonary edema vs aspiration pneumonia vs COPD  CT A/P noted for Right sided pna  started on cefepime and flagyl   continue 4L oxygen via nasal cannula  c/w duonebs q6h for now  maintain oxygen sat. >92%  f/u CXR  Pulm. Dr. Loredo consulted, apprec recs  ID Dr. Newby consulted, apprec recs     #Intractable Vomiting  pt p/w dark colored emesis  concern for possible SBO vs Gastric ulcer  CT A/P was neg. for SBO  Keep NPO for now, hold off on all PO meds  switch to IV protonix 40 mg BID  continue clear liquids   will consult GI in am for possible endoscopy  monitor CBC    # Anemia due to end stage renal disease.   In the setting of HD  possible gastric ulcer  Hgb 7.0, consider transfusion with next HD session  Continue  Epogen with dialysis  Continue Iron tabs.    #ESRD on dialysis.   s/p tele as hyperkalemia is now resolved  last HD completed on 7/29  Renal diet  Dr. Mosher, Nephrology, following  Recommendations appreciated.    #CHF  dc po lasix and metolazone, pt is anuric    #CAD  continue aspirin and statin    #Delirium   started on zoloft 50 mg qhs  PRN: Haldol 0.5 mg IM/IV q 8 hrs PRN for agitation  Monitor his QTc  Psych followed    #Hyperphosphatemia.   In the setting of ESRD  Continue Sevelamer  Trend Phos  resolved  Dr. Mosher, Nephrology, following    #HTN (hypertension).   Takes Hydralazine, Metoprolol and Norvasc at home  will consider IV hydralazine for systolic BP >170  BP goal <140/90  Continue home regimen   Monitor BP  DASH diet.    #DM (diabetes mellitus).   Controlled in the community as evidenced by A1c 6.1  Takes 4U Lantus QHS at home  Frustrated with frequent interruptions and refusing care  Continue home Lantus regimen  Finger stick QHS  Check glucose with AM labs.      #Prophylactic measure.   DVT PPX: Heparin. Pt is aox3, unable to eat due to ongoing nausea and vomiting, with dark colored emesis, concern for possible SBO.   Pt also reports abdominal pain, upon physical exam pt has abdominal distention with RUQ and Umbilical Tenderness.   Pt last bowel movement was 2 days ago. Discussed with patient for possible SBO, does not want NGT tube.   Repeat CT A/P was negative for SBO.   Additionally pt remains on 4L NC, also concerning for possible AHRF 2/2 Possible Aspiration PNA vs worsening Pulmonary Edema. Awaiting chest xray results.     medications also reviewed:  lasix 80 mg po and metolazone was dc in setting of anuria    Discussed with Attending Dr. De La Torre, will plan pt for CT A/P.    REVIEW OF SYSTEMS:  CONSTITUTIONAL: No fever, chills  ENMT:  No difficulty hearing, no change in vision  NECK: No pain or stiffness  RESPIRATORY: No cough, SOB  CARDIOVASCULAR: No chest pain, palpitations  GASTROINTESTINAL: +nausea, vomiting, abdominal pain.   GENITOURINARY: No dysuria  NEUROLOGICAL: No HA  SKIN: No itching, burning, rashes, or lesions   LYMPH NODES: No enlarged glands  ENDOCRINE: No heat or cold intolerance; No hair loss  MUSCULOSKELETAL: No joint pain or swelling; No muscle, back, or extremity pain  PSYCHIATRIC: No depression, anxiety  HEME/LYMPH: No easy bruising, or bleeding gums    T(C): 37 (07-30-23 @ 12:33), Max: 37.2 (07-30-23 @ 08:05)  HR: 80 (07-30-23 @ 12:33) (80 - 102)  BP: 189/86 (07-30-23 @ 12:33) (112/72 - 189/86)  RR: 18 (07-30-23 @ 12:33) (18 - 22)  SpO2: 98% (07-30-23 @ 12:33) (93% - 98%)  Wt(kg): --Vital Signs Last 24 Hrs  T(C): 37 (30 Jul 2023 12:33), Max: 37.2 (30 Jul 2023 08:05)  T(F): 98.6 (30 Jul 2023 12:33), Max: 99 (30 Jul 2023 08:05)  HR: 80 (30 Jul 2023 12:33) (80 - 102)  BP: 189/86 (30 Jul 2023 12:33) (112/72 - 189/86)  BP(mean): --  RR: 18 (30 Jul 2023 12:33) (18 - 22)  SpO2: 98% (30 Jul 2023 12:33) (93% - 98%)    Parameters below as of 30 Jul 2023 12:33  Patient On (Oxygen Delivery Method): nasal cannula  O2 Flow (L/min): 4      MEDICATIONS  (STANDING):  albuterol/ipratropium for Nebulization 3 milliLiter(s) Nebulizer every 6 hours  amLODIPine   Tablet 10 milliGRAM(s) Oral daily  aspirin  chewable 81 milliGRAM(s) Oral daily  atorvastatin 40 milliGRAM(s) Oral at bedtime  budesonide 160 MICROgram(s)/formoterol 4.5 MICROgram(s) Inhaler 2 Puff(s) Inhalation two times a day  dextrose 5%. 1000 milliLiter(s) (50 mL/Hr) IV Continuous <Continuous>  dextrose 5%. 1000 milliLiter(s) (100 mL/Hr) IV Continuous <Continuous>  dextrose 50% Injectable 25 Gram(s) IV Push once  dextrose 50% Injectable 25 Gram(s) IV Push once  dextrose 50% Injectable 12.5 Gram(s) IV Push once  epoetin beverley (PROCRIT) Injectable 25231 Unit(s) IV Push <User Schedule>  folic acid 1 milliGRAM(s) Oral daily  glucagon  Injectable 1 milliGRAM(s) IntraMuscular once  heparin   Injectable 5000 Unit(s) SubCutaneous every 12 hours  hydrALAZINE 25 milliGRAM(s) Oral three times a day  insulin glargine Injectable (LANTUS) 4 Unit(s) SubCutaneous at bedtime  latanoprost 0.005% Ophthalmic Solution 1 Drop(s) Both EYES at bedtime  levothyroxine 25 MICROGram(s) Oral daily  LORazepam     Tablet 0.5 milliGRAM(s) Oral <User Schedule>  metolazone 10 milliGRAM(s) Oral daily  metoprolol succinate ER 50 milliGRAM(s) Oral daily  oxycodone    5 mG/acetaminophen 325 mG 1 Tablet(s) Oral every 6 hours  pantoprazole  Injectable 40 milliGRAM(s) IV Push every 12 hours  sertraline 50 milliGRAM(s) Oral daily  sucralfate 1 Gram(s) Oral four times a day    MEDICATIONS  (PRN):  dextrose Oral Gel 15 Gram(s) Oral once PRN Blood Glucose LESS THAN 70 milliGRAM(s)/deciliter  haloperidol    Injectable 0.5 milliGRAM(s) IntraMuscular every 8 hours PRN Agitation  ondansetron Injectable 4 milliGRAM(s) IV Push every 6 hours PRN Nausea and/or Vomiting      FOCUSED PHYSICAL EXAM:  GENERAL: lethargic, uncomfortable appearing  EYES: clear conjunctiva  ENMT: Moist mucous membranes  NECK: Supple, No JVD, Normal thyroid  CHEST/LUNG: +nasal cannula. Clear to auscultation bilaterally; No rales, rhonchi, wheezing, or rubs  HEART: S1, S2, Regular rate and rhythm  ABDOMEN: Rigid, RUQ/umbilical tenderness, +distended; Bowel sounds present  NEURO: Alert & Oriented X3  EXTREMITIES: +left BKA. No LE edema, no calf tenderness  LYMPH: No lymphadenopathy noted  SKIN: No rashes or lesions    LABS:                        8.0    4.87  )-----------( 71       ( 30 Jul 2023 03:07 )             25.1     07-30    141  |  102  |  71<H>  ----------------------------<  92  3.9   |  25  |  11.30<H>    Ca    7.9<L>      30 Jul 2023 03:07  Phos  3.7     07-30  Mg     2.6     07-30    TPro  6.6  /  Alb  3.1<L>  /  TBili  0.7  /  DBili  x   /  AST  26  /  ALT  41  /  AlkPhos  97  07-30      CAPILLARY BLOOD GLUCOSE      POCT Blood Glucose.: 78 mg/dL (30 Jul 2023 12:10)  POCT Blood Glucose.: 77 mg/dL (30 Jul 2023 07:47)  POCT Blood Glucose.: 92 mg/dL (30 Jul 2023 03:04)  POCT Blood Glucose.: 132 mg/dL (29 Jul 2023 20:42)  POCT Blood Glucose.: 156 mg/dL (29 Jul 2023 16:37)        Urinalysis Basic - ( 30 Jul 2023 03:07 )    Color: x / Appearance: x / SG: x / pH: x  Gluc: 92 mg/dL / Ketone: x  / Bili: x / Urobili: x   Blood: x / Protein: x / Nitrite: x   Leuk Esterase: x / RBC: x / WBC x   Sq Epi: x / Non Sq Epi: x / Bacteria: x        ASSESSMENT:    62 year old male from [facility], walks with RW, with PMH of ESRD on HD (TTS), BKA- L w/prosthetic leg, DM, Left eye blindness, CHF, CAD, HTN, HLD, osteoporosis, bronchitis, current smoker, and anemia who presents with complaint of missed dialysis. Last HD 7/22. Pt states he often missed HD sessions.   Pt complains of right leg swelling and states he does not make any urine. Patient states he was having mild shortness of breath. Patient admitted to tele for missed dialysis found to have pulmonary edema and BNP [id number]. Cardiology and nephro following.     #Acute Hypoxic Respiratory Failure  possible in setting of pulmonary edema vs aspiration pneumonia vs COPD  CT A/P noted for Right sided pna  started on cefepime and flagyl   continue 4L oxygen via nasal cannula  c/w duonebs q6h for now  maintain oxygen sat. >92%  f/u CXR  Pulm. Dr. Loredo consulted, apprec recs  ID Dr. Newby consulted, apprec recs     #Intractable Vomiting  pt p/w dark colored emesis  concern for possible SBO vs Gastric ulcer  CT A/P was neg. for SBO  switch to IV protonix 40 mg BID  continue clear liquids   will consult GI in am for possible endoscopy  monitor CBC    # Anemia due to end stage renal disease.   In the setting of HD  possible gastric ulcer  Hgb 7.0, consider transfusion with next HD session  Continue  Epogen with dialysis  Continue Iron tabs.    #ESRD on dialysis.   s/p tele as hyperkalemia is now resolved  last HD completed on 7/29  Renal diet  Dr. Mosher, Nephrology, following  Recommendations appreciated.    #CHF  dc po lasix and metolazone, pt is anuric    #CAD  continue aspirin and statin    #Delirium   started on zoloft 50 mg qhs  PRN: Haldol 0.5 mg IM/IV q 8 hrs PRN for agitation  Monitor his QTc  Psych followed    #Hyperphosphatemia.   In the setting of ESRD  Continue Sevelamer  Trend Phos  resolved  Dr. Mosher, Nephrology, following    #HTN (hypertension).   Takes Hydralazine, Metoprolol and Norvasc at home  BP goal <140/90  Continue home regimen   Monitor BP  DASH diet.    #DM (diabetes mellitus).   Controlled in the community as evidenced by A1c 6.1  Takes 4U Lantus QHS at home  Frustrated with frequent interruptions and refusing care  Continue home Lantus regimen  Finger stick QHS  Check glucose with AM labs.      #Prophylactic measure.   DVT PPX: Heparin. Pt is aox3, unable to eat due to ongoing nausea and vomiting, with dark colored emesis, concern for possible SBO.   Pt also reports abdominal pain, upon physical exam pt has abdominal distention with RUQ and Umbilical Tenderness.   Pt last bowel movement was 2 days ago. Discussed with patient for possible SBO, does not want NGT tube.   Repeat CT A/P was negative for SBO.   Additionally pt remains on 4L NC, also concerning for possible AHRF 2/2 Possible Aspiration PNA vs worsening Pulmonary Edema. Awaiting chest xray results.     medications also reviewed:  lasix 80 mg po and metolazone was dc in setting of anuria      REVIEW OF SYSTEMS:  CONSTITUTIONAL: No fever, chills  ENMT:  No difficulty hearing, no change in vision  NECK: No pain or stiffness  RESPIRATORY: No cough, SOB  CARDIOVASCULAR: No chest pain, palpitations  GASTROINTESTINAL: +nausea, vomiting, abdominal pain.   GENITOURINARY: No dysuria  NEUROLOGICAL: No HA  SKIN: No itching, burning, rashes, or lesions   LYMPH NODES: No enlarged glands  ENDOCRINE: No heat or cold intolerance; No hair loss  MUSCULOSKELETAL: No joint pain or swelling; No muscle, back, or extremity pain  PSYCHIATRIC: No depression, anxiety  HEME/LYMPH: No easy bruising, or bleeding gums    T(C): 37 (07-30-23 @ 12:33), Max: 37.2 (07-30-23 @ 08:05)  HR: 80 (07-30-23 @ 12:33) (80 - 102)  BP: 189/86 (07-30-23 @ 12:33) (112/72 - 189/86)  RR: 18 (07-30-23 @ 12:33) (18 - 22)  SpO2: 98% (07-30-23 @ 12:33) (93% - 98%)  Wt(kg): --Vital Signs Last 24 Hrs  T(C): 37 (30 Jul 2023 12:33), Max: 37.2 (30 Jul 2023 08:05)  T(F): 98.6 (30 Jul 2023 12:33), Max: 99 (30 Jul 2023 08:05)  HR: 80 (30 Jul 2023 12:33) (80 - 102)  BP: 189/86 (30 Jul 2023 12:33) (112/72 - 189/86)  BP(mean): --  RR: 18 (30 Jul 2023 12:33) (18 - 22)  SpO2: 98% (30 Jul 2023 12:33) (93% - 98%)    Parameters below as of 30 Jul 2023 12:33  Patient On (Oxygen Delivery Method): nasal cannula  O2 Flow (L/min): 4      MEDICATIONS  (STANDING):  albuterol/ipratropium for Nebulization 3 milliLiter(s) Nebulizer every 6 hours  amLODIPine   Tablet 10 milliGRAM(s) Oral daily  aspirin  chewable 81 milliGRAM(s) Oral daily  atorvastatin 40 milliGRAM(s) Oral at bedtime  budesonide 160 MICROgram(s)/formoterol 4.5 MICROgram(s) Inhaler 2 Puff(s) Inhalation two times a day  dextrose 5%. 1000 milliLiter(s) (50 mL/Hr) IV Continuous <Continuous>  dextrose 5%. 1000 milliLiter(s) (100 mL/Hr) IV Continuous <Continuous>  dextrose 50% Injectable 25 Gram(s) IV Push once  dextrose 50% Injectable 25 Gram(s) IV Push once  dextrose 50% Injectable 12.5 Gram(s) IV Push once  epoetin beverley (PROCRIT) Injectable 17572 Unit(s) IV Push <User Schedule>  folic acid 1 milliGRAM(s) Oral daily  glucagon  Injectable 1 milliGRAM(s) IntraMuscular once  heparin   Injectable 5000 Unit(s) SubCutaneous every 12 hours  hydrALAZINE 25 milliGRAM(s) Oral three times a day  insulin glargine Injectable (LANTUS) 4 Unit(s) SubCutaneous at bedtime  latanoprost 0.005% Ophthalmic Solution 1 Drop(s) Both EYES at bedtime  levothyroxine 25 MICROGram(s) Oral daily  LORazepam     Tablet 0.5 milliGRAM(s) Oral <User Schedule>  metolazone 10 milliGRAM(s) Oral daily  metoprolol succinate ER 50 milliGRAM(s) Oral daily  oxycodone    5 mG/acetaminophen 325 mG 1 Tablet(s) Oral every 6 hours  pantoprazole  Injectable 40 milliGRAM(s) IV Push every 12 hours  sertraline 50 milliGRAM(s) Oral daily  sucralfate 1 Gram(s) Oral four times a day    MEDICATIONS  (PRN):  dextrose Oral Gel 15 Gram(s) Oral once PRN Blood Glucose LESS THAN 70 milliGRAM(s)/deciliter  haloperidol    Injectable 0.5 milliGRAM(s) IntraMuscular every 8 hours PRN Agitation  ondansetron Injectable 4 milliGRAM(s) IV Push every 6 hours PRN Nausea and/or Vomiting      FOCUSED PHYSICAL EXAM:  GENERAL: lethargic, uncomfortable appearing  EYES: clear conjunctiva  ENMT: Moist mucous membranes  NECK: Supple, No JVD, Normal thyroid  CHEST/LUNG: +nasal cannula. Clear to auscultation bilaterally; No rales, rhonchi, wheezing, or rubs  HEART: S1, S2, Regular rate and rhythm  ABDOMEN: Rigid, RUQ/umbilical tenderness, +distended; Bowel sounds present  NEURO: Alert & Oriented X3  EXTREMITIES: +left BKA. No LE edema, no calf tenderness  LYMPH: No lymphadenopathy noted  SKIN: No rashes or lesions    LABS:                        8.0    4.87  )-----------( 71       ( 30 Jul 2023 03:07 )             25.1     07-30    141  |  102  |  71<H>  ----------------------------<  92  3.9   |  25  |  11.30<H>    Ca    7.9<L>      30 Jul 2023 03:07  Phos  3.7     07-30  Mg     2.6     07-30    TPro  6.6  /  Alb  3.1<L>  /  TBili  0.7  /  DBili  x   /  AST  26  /  ALT  41  /  AlkPhos  97  07-30      CAPILLARY BLOOD GLUCOSE      POCT Blood Glucose.: 78 mg/dL (30 Jul 2023 12:10)  POCT Blood Glucose.: 77 mg/dL (30 Jul 2023 07:47)  POCT Blood Glucose.: 92 mg/dL (30 Jul 2023 03:04)  POCT Blood Glucose.: 132 mg/dL (29 Jul 2023 20:42)  POCT Blood Glucose.: 156 mg/dL (29 Jul 2023 16:37)        Urinalysis Basic - ( 30 Jul 2023 03:07 )    Color: x / Appearance: x / SG: x / pH: x  Gluc: 92 mg/dL / Ketone: x  / Bili: x / Urobili: x   Blood: x / Protein: x / Nitrite: x   Leuk Esterase: x / RBC: x / WBC x   Sq Epi: x / Non Sq Epi: x / Bacteria: x        ASSESSMENT:    62 year old male from [facility], walks with RW, with PMH of ESRD on HD (TTS), BKA- L w/prosthetic leg, DM, Left eye blindness, CHF, CAD, HTN, HLD, osteoporosis, bronchitis, current smoker, and anemia who presents with complaint of missed dialysis. Last HD 7/22. Pt states he often missed HD sessions.   Pt complains of right leg swelling and states he does not make any urine. Patient states he was having mild shortness of breath. Patient admitted to tele for missed dialysis found to have pulmonary edema and BNP [id number]. Cardiology and nephro following.     #Acute Hypoxic Respiratory Failure  possible in setting of pulmonary edema vs aspiration pneumonia vs COPD  CT A/P noted for Right sided pna  started on cefepime and flagyl   continue 4L oxygen via nasal cannula  c/w duonebs q6h for now  maintain oxygen sat. >92%  f/u CXR  Pulm. Dr. Loredo consulted, apprec recs  ID Dr. Newby consulted, apprec recs     #Intractable Vomiting  pt p/w dark colored emesis  concern for possible SBO vs Gastric ulcer  CT A/P was neg. for SBO  switch to IV protonix 40 mg BID  continue clear liquids   will consult GI in am for possible endoscopy  monitor CBC  Surgery consulted, apprec. recs    # Anemia due to end stage renal disease.   In the setting of HD  possible gastric ulcer  Hgb 7.0, consider transfusion with next HD session  Continue  Epogen with dialysis  Continue Iron tabs.    #ESRD on dialysis.   s/p tele as hyperkalemia is now resolved  last HD completed on 7/29  Renal diet  Dr. Mosher, Nephrology, following  Recommendations appreciated.    #CHF  dc po lasix and metolazone, pt is anuric    #CAD  continue aspirin and statin    #Delirium   started on zoloft 50 mg qhs  PRN: Haldol 0.5 mg IM/IV q 8 hrs PRN for agitation  Monitor his QTc  Psych followed    #Hyperphosphatemia.   In the setting of ESRD  Continue Sevelamer  Trend Phos  resolved  Dr. Mosher, Nephrology, following    #HTN (hypertension).   Takes Hydralazine, Metoprolol and Norvasc at home  BP goal <140/90  Continue home regimen   Monitor BP  DASH diet.    #DM (diabetes mellitus).   Controlled in the community as evidenced by A1c 6.1  Takes 4U Lantus QHS at home  Frustrated with frequent interruptions and refusing care  Continue home Lantus regimen  Finger stick QHS  Check glucose with AM labs.      #Prophylactic measure.   DVT PPX: Heparin.

## 2023-07-30 NOTE — RAPID RESPONSE TEAM SUMMARY - NSSITUATIONBACKGROUNDRRT_GEN_ALL_CORE
Patient is 62 year old male from [facility], walks with RW, with PMH of ESRD on HD (TTS), BKA- L w/prosthetic leg, DM, Left eye blindness, CHF, CAD, HTN, HLD, osteoporosis, bronchitis, current smoker, and anemia who presents with complaint of missed dialysis. Last HD 7/22. Pt states he often missed HD sessions.  patient states he missed this HD session due to mild pain in left leg, patient remains able to ambulate. Pt complains of right leg swelling and states he does not make any urine. Patient states he was having mild shortness of breath.  Pt denies any fever, chills, N/V/D, constipation, CP, numbness or tingling.

## 2023-07-30 NOTE — PROGRESS NOTE ADULT - ASSESSMENT
Agree with above assessment and plan as outlined above.    - f/u CXR ? PNA vs pulmonary edema  - keep net negative with HD pt is anuric     Dominic Still MD, [facility]  BEEPER [phone]

## 2023-07-30 NOTE — RAPID RESPONSE TEAM SUMMARY - NSADDTLFINDINGSRRT_GEN_ALL_CORE
RRT Called for Hypoxia, initially pt was noted to be hypoxic to 70% with poor waveform on nasal canula. Pt was admitted for volume overload secondary to missed dialysis. Pt was dialyzed earlier today. During the rapid pt was put on 15L NRB, saturation improved, was put back on nasal canula 4L and was saturating 100% with good wave form.   Vitals: 172/80, 109 HR, 97% 4L, BS 92,   Concerns for possible drop in h/h as pt has been having hemoptysis. CBC and CMP ordered.

## 2023-07-30 NOTE — PROGRESS NOTE ADULT - SUBJECTIVE AND OBJECTIVE BOX
Events noted, Pt upright in bed requiring O2        albuterol/ipratropium for Nebulization 3 milliLiter(s) Nebulizer every 6 hours  amLODIPine   Tablet 10 milliGRAM(s) Oral daily  aspirin  chewable 81 milliGRAM(s) Oral daily  atorvastatin 40 milliGRAM(s) Oral at bedtime  budesonide 160 MICROgram(s)/formoterol 4.5 MICROgram(s) Inhaler 2 Puff(s) Inhalation two times a day  dextrose 5%. 1000 milliLiter(s) IV Continuous <Continuous>  dextrose 5%. 1000 milliLiter(s) IV Continuous <Continuous>  dextrose 50% Injectable 25 Gram(s) IV Push once  dextrose 50% Injectable 25 Gram(s) IV Push once  dextrose 50% Injectable 12.5 Gram(s) IV Push once  dextrose Oral Gel 15 Gram(s) Oral once PRN  epoetin beverley (PROCRIT) Injectable 51172 Unit(s) IV Push <User Schedule>  folic acid 1 milliGRAM(s) Oral daily  glucagon  Injectable 1 milliGRAM(s) IntraMuscular once  haloperidol    Injectable 0.5 milliGRAM(s) IntraMuscular every 8 hours PRN  heparin   Injectable 5000 Unit(s) SubCutaneous every 12 hours  hydrALAZINE 25 milliGRAM(s) Oral three times a day  insulin glargine Injectable (LANTUS) 4 Unit(s) SubCutaneous at bedtime  latanoprost 0.005% Ophthalmic Solution 1 Drop(s) Both EYES at bedtime  levothyroxine 25 MICROGram(s) Oral daily  LORazepam     Tablet 0.5 milliGRAM(s) Oral <User Schedule>  metolazone 10 milliGRAM(s) Oral daily  metoprolol succinate ER 50 milliGRAM(s) Oral daily  ondansetron   Disintegrating Tablet 4 milliGRAM(s) Oral every 6 hours PRN  ondansetron   Disintegrating Tablet 4 milliGRAM(s) Oral every 6 hours PRN  oxycodone    5 mG/acetaminophen 325 mG 1 Tablet(s) Oral every 6 hours  pantoprazole  Injectable 40 milliGRAM(s) IV Push every 12 hours  sertraline 50 milliGRAM(s) Oral daily  sucralfate 1 Gram(s) Oral four times a day                            8.0    4.87  )-----------( 71 ( 30 Jul 2023 03:07 )             25.1       Hemoglobin: 8.0 g/dL (07-30 @ 03:07)  Hemoglobin: 6.5 g/dL (07-29 @ 11:12)  Hemoglobin: 7.0 g/dL (07-28 @ 10:35)  Hemoglobin: 7.2 g/dL (07-26 @ 14:10)      07-30    141  |  102  |  71<H>  ----------------------------<  92  3.9   |  25  |  11.30<H>    Ca    7.9<L>      30 Jul 2023 03:07  Phos  3.7     07-30  Mg     2.6     07-30    TPro  6.6  /  Alb  3.1<L>  /  TBili  0.7  /  DBili  x   /  AST  26  /  ALT  41  /  AlkPhos  97  07-30    Creatinine Trend: 11.30<--, 16.60<--, 15.60<--, 15.80<--, 17.20<--, 15.90<--    COAGS:           T(C): 37.2 (07-30-23 @ 08:05), Max: 37.2 (07-30-23 @ 08:05)  HR: 98 (07-30-23 @ 08:05) (80 - 102)  BP: 169/77 (07-30-23 @ 08:05) (112/72 - 172/80)  RR: 19 (07-30-23 @ 08:05) (18 - 22)  SpO2: 94% (07-30-23 @ 08:05) (92% - 97%)  Wt(kg): --    I&O's Summary    29 Jul 2023 07:01  -  30 Jul 2023 07:00  --------------------------------------------------------  IN: 750 mL / OUT: 3156 mL / NET: -2406 mL        Gen: Appears well in NAD  HEENT:  (-)icterus (-)pallor  CV: N S1 S2 1/6 DIANA (+)2 Pulses B/l  Resp:  Clear to ausculatation B/L, normal effort  GI: (+) BS Soft, NT, ND  Lymph:  (-)Edema, (-)obvious lymphadenopathy  Skin: Warm to touch, Normal turgor  Psych: Appropriate mood and affect       ECG:  	sinus 1degree AV block    RADIOLOGY:         CXR:    < from: Xray Chest 1 View- PORTABLE-Urgent (07.26.23 @ 13:00) >  1. Discoid atelectasis in the right upper lobe adjacent to the minor   fissure versus pleural fluid within the fissure itself. This is not   present previously.  2. Cardiomegaly without pulmonary edema.  3. Right sided subclavian vascular stent along with apparent coils within   the soft tissues of the medial right upper extremity, unchanged.    < end of copied text >    ASSESSMENT/PLAN: 	62y Male  [place]ate[place], walks with RW, with PMH of ESRD on HD (TTS), BKA- L w/prosthetic leg, DM, Left eye blindness, CHF,, HTN, HLD, EF 45-50%, normal perfusion 4/23 osteoporosis, bronchitis, current smoker, and anemia who presents with complaint of missed dialysis.     # CHF  - Due to missed HD  - HD per renal  -    # Positive trop  - nothing to suggest ACS.  His trop has been higher in the past and demonstrated normal perfusion on Stress 4/23  - ECHO pending     # Noncompliance  - Behavioral health f/u    # Smoking  - Cessation stressed    # HTN  - Suspect BP will improve once euvolemic

## 2023-07-30 NOTE — CONSULT NOTE ADULT - NS ATTEND AMEND GEN_ALL_CORE FT
Pt w/ multiple co-morbidities presenting w/ having missed HD  Pt reporting some abd pain today and CT w/ question of possible pericholecystitic Pt w/ multiple co-morbidities presenting w/ having missed HD  Pt reporting some abd pain today and CT w/ question of possible pericholecystic   Also significant pancreatic and biliary ductal dilitation w/o clear etiology on MRCP w/ recommendation for endoscopy (doesn't appear he has had one)    - recommend obtaining a HIDA to more definitively determine if acute cholecystitis  - recommend GI consult for possible endoscopy (if he is medically stable enough for it)  - other management per primary team    Adekemi MD Kadeem  Attending physician

## 2023-07-31 NOTE — PROGRESS NOTE ADULT - PROBLEM SELECTOR PLAN 6
In the setting of HD  Hgb 7.0, consider transfusion with next HD session  Continue  Epogen with dialysis  Continue Iron tabs Controlled  Takes Hydralazine, Metoprolol and Norvasc at home  Continue home regimen   Monitor BP  DASH diet Continue home meds Hydralazine, Metoprolol and Norvasc at home  Continue HD  Monitor BP  DASH diet started on zoloft 50 mg qhs  PRN: Haldol 0.5 mg IM/IV q 8 hrs PRN for agitation  Monitor his QTc  Psych followed pt p/w RUQ abdominal pain and vomiting  CT A/P noted for cholelithiasis  HIDA scan neg. for acute cholecystitis  continue pain control - tylenol PRN

## 2023-07-31 NOTE — PROGRESS NOTE ADULT - PROBLEM SELECTOR PLAN 9
continue aspirin and statin In the setting of ESRD  improved with dialysis  Trend Phoroscoe Mosher, Nephrology, following  Recommendations appreciated Controlled in the community as evidenced by A1c 6.1  Takes 4U Lantus QHS at home  Frustrated with frequent interruptions and refusing care  Continue home Lantus regimen  Continue ativan 0.5 mg tiw with dialysis  Finger stick QHS  Check glucose with AM labs

## 2023-07-31 NOTE — PROGRESS NOTE ADULT - PROBLEM SELECTOR PROBLEM 2
Hyperphosphatemia ESRD on dialysis Acute on chronic combined systolic and diastolic congestive heart failure

## 2023-07-31 NOTE — PROGRESS NOTE ADULT - PROBLEM SELECTOR PLAN 8
Controlled in the community as evidenced by A1c 6.1  Takes 4U Lantus QHS at home  Frustrated with frequent interruptions and refusing care  Continue home Lantus regimen  Finger stick QHS  Check glucose with AM labs continue aspirin and statin started on zoloft 50 mg qhs  PRN: Haldol 0.5 mg IM/IV q 8 hrs PRN for agitation  Monitor his QTc  Psych followed

## 2023-07-31 NOTE — CONSULT NOTE ADULT - NS ATTEND AMEND GEN_ALL_CORE FT
Date of service 7/31/23. Patient seen and examined. Long-standing hx of noncompliance and poor follow-up. Presenting with coffee ground emesis in setting of missed dialysis. Of note, multiple admissions for same thing with missed dialysis, becomes uremic and develops n/v with eventual coffee ground with prior EGDs showing esophagitis. Likely recurrence of the same. Pt refusing exam or history. Wishes to sleep. Hb at baseline. No reported melena. VSS. 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 In light of patient wishes, holding off on any GI interventions. PPI BID. Carafate. Reconsult PRN.     Total time spent to complete patient's bedside assessment, physical examination, review medical chart including labs & imaging, discuss medical plan of care with housestaff was more than 50 minutes.

## 2023-07-31 NOTE — PROGRESS NOTE ADULT - PROBLEM SELECTOR PLAN 2
In the setting of ESRD  Continue Shahrzad Mosher, Nephrology, following  Recommendations appreciated CT CAP noted above  1.5H in HD yesterday  Refused HD today  K 5.4, refusing TELE  Renal diet  Dr. Mosher, Nephrology, following  Recommendations appreciated schedule T/Thurs/Sat  s/p tele as hyperkalemia is now resolved  last HD completed on 7/29  Renal diet  Dr. Mosher, Nephrology, following schedule T/Thurs/Sat  s/p tele as hyperkalemia is now resolved  last HD completed on 7/29  patient refused additional HD on 7/31  Renal diet  Dr. Mosher, Nephrology, following echo from 2/2023 shows EF 45-50%, and grade 1 DD  continue lasix 80 mg daily   continue HD

## 2023-07-31 NOTE — PROGRESS NOTE ADULT - PROBLEM SELECTOR PLAN 10
DVT PPX: Heparin DVT PPX: hold heparin for now due to ongoing anemia and concern for possible gastric ulcer continue aspirin and statin

## 2023-07-31 NOTE — PROGRESS NOTE ADULT - PROBLEM SELECTOR PLAN 7
DVT PPX: Heparin started on zoloft 50 mg qhs  PRN: Haldol 0.5 mg IM/IV q 8 hrs PRN for agitation  Monitor his QTc  Psych followed Controlled in the community as evidenced by A1c 6.1  Takes 4U Lantus QHS at home  Frustrated with frequent interruptions and refusing care  Continue home Lantus regimen  Finger stick QHS  Check glucose with AM labs Continue home meds Hydralazine, Metoprolol and Norvasc at home  Continue HD  Monitor BP  DASH diet

## 2023-07-31 NOTE — PROGRESS NOTE ADULT - PROBLEM SELECTOR PLAN 11
In the setting of ESRD  improved with dialysis  Trend Phoroscoe Mosher, Nephrology, following  Recommendations appreciated

## 2023-07-31 NOTE — PROGRESS NOTE ADULT - PROBLEM SELECTOR PLAN 1
CT CAP noted above  1.5H in HD yesterday  Refused HD today  K 5.4, refusing TELE  Renal diet  Dr. Mosher, Nephrology, following  Recommendations appreciated possible in setting of pulmonary edema vs aspiration pneumonia vs COPD  CT A/P noted for Right sided pna  repeat CXR showing worsening CHF  started on cefepime and flagyl   continue 4L oxygen via nasal cannula  c/w duonebs q6h for now  maintain oxygen sat. >92%  Nephrology following, next HD 8/1  Pulm. Dr. Loredo consulted, apprec recs  ID Dr. Newby consulted, apprec recs possible in setting of pulmonary edema vs aspiration pneumonia vs COPD  CT A/P noted for Right sided pna  repeat CXR showing worsening CHF  started on cefepime and flagyl   continue 4L oxygen via nasal cannula  c/w duonebs q6h for now  c/w symbicort 160/4.5 mcg 2 puffs bid  maintain oxygen sat. >92%  Nephrology following, next HD 8/1  Pulm. Dr. Loredo consulted, apprec recs  ID Dr. Newby consulted, apprec recs

## 2023-07-31 NOTE — PROGRESS NOTE ADULT - PROBLEM SELECTOR PLAN 3
Controlled  Takes Hydralazine, Metoprolol and Norvasc at home  Continue home regimen   Monitor BP  DASH diet pt p/w intractable Vomiting and coffee ground emesis  concern for possible SBO vs Gastric ulcer  CT A/P was neg. for SBO  switch to IV protonix 40 mg BID  c/w reglan 10 mg q8h PRN  NPO for now  monitor CBC  Surgery following, no surgery  GI following, no endoscopy schedule T/Thurs/Sat  s/p tele as hyperkalemia is now resolved  last HD completed on 7/29  patient refused additional HD on 7/31  Renal diet  Dr. Mosher, Nephrology, following

## 2023-07-31 NOTE — PROGRESS NOTE ADULT - SUBJECTIVE AND OBJECTIVE BOX
Time of Visit:  Patient seen and examined.     MEDICATIONS  (STANDING):  albuterol/ipratropium for Nebulization 3 milliLiter(s) Nebulizer every 6 hours  amLODIPine   Tablet 10 milliGRAM(s) Oral daily  aspirin enteric coated 81 milliGRAM(s) Oral daily  atorvastatin 40 milliGRAM(s) Oral at bedtime  budesonide 160 MICROgram(s)/formoterol 4.5 MICROgram(s) Inhaler 2 Puff(s) Inhalation two times a day  cefepime   IVPB 1000 milliGRAM(s) IV Intermittent every 24 hours  dextrose 5%. 1000 milliLiter(s) (50 mL/Hr) IV Continuous <Continuous>  dextrose 5%. 1000 milliLiter(s) (100 mL/Hr) IV Continuous <Continuous>  dextrose 50% Injectable 25 Gram(s) IV Push once  dextrose 50% Injectable 12.5 Gram(s) IV Push once  dextrose 50% Injectable 25 Gram(s) IV Push once  epoetin beverley (PROCRIT) Injectable 58036 Unit(s) IV Push <User Schedule>  folic acid 1 milliGRAM(s) Oral daily  furosemide    Tablet 80 milliGRAM(s) Oral daily  glucagon  Injectable 1 milliGRAM(s) IntraMuscular once  hydrALAZINE 25 milliGRAM(s) Oral three times a day  insulin glargine Injectable (LANTUS) 4 Unit(s) SubCutaneous at bedtime  latanoprost 0.005% Ophthalmic Solution 1 Drop(s) Both EYES at bedtime  levothyroxine 25 MICROGram(s) Oral daily  LORazepam     Tablet 0.5 milliGRAM(s) Oral <User Schedule>  metoprolol succinate ER 50 milliGRAM(s) Oral daily  metroNIDAZOLE  IVPB 500 milliGRAM(s) IV Intermittent every 8 hours  pantoprazole  Injectable 40 milliGRAM(s) IV Push every 12 hours  sertraline 50 milliGRAM(s) Oral daily  sucralfate 1 Gram(s) Oral four times a day      MEDICATIONS  (PRN):  dextrose Oral Gel 15 Gram(s) Oral once PRN Blood Glucose LESS THAN 70 milliGRAM(s)/deciliter  haloperidol    Injectable 0.5 milliGRAM(s) IntraMuscular every 8 hours PRN Agitation  ondansetron Injectable 4 milliGRAM(s) IV Push every 6 hours PRN Nausea and/or Vomiting       Medications up to date at time of exam.    ROS; No fever, chills, cough, nasal congestion.   PHYSICAL EXAMINATION:  Vital Signs Last 24 Hrs  T(C): 36.5 (31 Jul 2023 11:25), Max: 37.5 (31 Jul 2023 07:56)  T(F): 97.7 (31 Jul 2023 11:25), Max: 99.5 (31 Jul 2023 07:56)  HR: 104 (31 Jul 2023 11:25) (92 - 104)  BP: 171/77 (31 Jul 2023 11:25) (131/61 - 171/77)  BP(mean): --  RR: 18 (31 Jul 2023 11:25) (18 - 18)  SpO2: 93% (31 Jul 2023 11:25) (93% - 100%)    Parameters below as of 31 Jul 2023 11:25  Patient On (Oxygen Delivery Method): nasal cannula  O2 Flow (L/min): 4     (if applicable)    General : Alert and oriented. No acute distress.     HEENT: Head is normocephalic and atraumatic. No nasal tenderness. Partially Blind. Mucous membranes are moist.     NECK: Supple, no palpable adenopathy.    LUNGS: Non labored. No wheezing. No use of accessory muscle.     HEART: S1 S2 Regular rate and no click/ rub.     ABDOMEN: Soft, nontender, and nondistended.  Active bowel sounds.     EXTREMITIES: Left BKA , wears prosthesis outpatient.     NEUROLOGIC: Awake, alert, oriented.     SKIN: Warm and moist . Non diaphoretic.       LABS:                        7.8    5.08  )-----------( 95       ( 31 Jul 2023 09:00 )             25.3     07-31    139  |  100  |  88<H>  ----------------------------<  88  4.3   |  24  |  13.80<H>    Ca    8.3<L>      31 Jul 2023 09:00  Phos  6.1     07-31  Mg     3.0     07-31    TPro  6.6  /  Alb  3.1<L>  /  TBili  0.7  /  DBili  x   /  AST  26  /  ALT  41  /  AlkPhos  97  07-30      Urinalysis Basic - ( 31 Jul 2023 09:00 )    Color: x / Appearance: x / SG: x / pH: x  Gluc: 88 mg/dL / Ketone: x  / Bili: x / Urobili: x   Blood: x / Protein: x / Nitrite: x   Leuk Esterase: x / RBC: x / WBC x   Sq Epi: x / Non Sq Epi: x / Bacteria: x                      MICROBIOLOGY: (if applicable)    RADIOLOGY & ADDITIONAL STUDIES:  EKG:   CXR:  ECHO:    IMPRESSION: 62y Male 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      Osteoarthritis      Vision loss of left eye  blind      ESRD on hemodialysis  T/Th/S      Cataract  both eyes - hx of sx done      BPH (benign prostatic hyperplasia)      UTI (urinary tract infection)  hx of      Bladder mass  hx of      H/O hematuria      Osteoporosis      Vision loss of right eye  decreased      Depression      Chronic GERD      Osteomyelitis of vertebra      CHF (congestive heart failure)      Below knee amputation status, left  2012- pt is wearing prostesis      History of right cataract extraction      History of left cataract extraction      S/P arteriovenous (AV) fistula creation  right arm brachiocephalic arteriovenous fistula on 11/08/2018      H/O hematuria  s/p bladder bx and fulguration 2/25/2020      H/O transurethral destruction of bladder lesion  2020      History of excision of mass  back mass on 03/31/2021    Impression: This is a 63 Y/O Male from [facility] with ESRD on HD ( T-Th-Sat ). Current smoker . Presented to ED due to Missed Dialysis, last HD 07-22-23 . Per Patient stated that he often missed HD sessions due to Mild left leg pain and right leg swelling . S/p RRT for SOB with Hypoxia due to Acute Hypoxic Respiratory Failure secondary to Pulmonary edema/ Fluid overload due to Missed Dialysis . CT Abdomen with Small Bilateral Pleural Effusions, Rt Lower Lung with groundglass opacity. No bowel obstruction. Small Gall Stone.         Suggestions:  O2 saturation 92% with O2 supplement. Continue Oxygen supplementation 4L NC. Monitor O2 saturation trend.   Reinforced to the patient the importance of 3x aweek compliance to Dialysis .  Dialysis per Nephro.   Reinforced  the importance of smoking cessation.  Continue Duoneb via nebulization Q 6 Hours.  Continue Symbicort 160-4.5 mcg 2 Puffs Twice daily.  No need to drain Bilateral Pleural Effusions at this time. On lasix 80 mg Oral Daily.   On Cefepime 1 Gm IVPB Daily.   Aspiration precautions with HOB elevation.           Time of Visit:  Patient seen and examined.     MEDICATIONS  (STANDING):  albuterol/ipratropium for Nebulization 3 milliLiter(s) Nebulizer every 6 hours  amLODIPine   Tablet 10 milliGRAM(s) Oral daily  aspirin enteric coated 81 milliGRAM(s) Oral daily  atorvastatin 40 milliGRAM(s) Oral at bedtime  budesonide 160 MICROgram(s)/formoterol 4.5 MICROgram(s) Inhaler 2 Puff(s) Inhalation two times a day  cefepime   IVPB 1000 milliGRAM(s) IV Intermittent every 24 hours  dextrose 5%. 1000 milliLiter(s) (50 mL/Hr) IV Continuous <Continuous>  dextrose 5%. 1000 milliLiter(s) (100 mL/Hr) IV Continuous <Continuous>  dextrose 50% Injectable 25 Gram(s) IV Push once  dextrose 50% Injectable 12.5 Gram(s) IV Push once  dextrose 50% Injectable 25 Gram(s) IV Push once  epoetin beverley (PROCRIT) Injectable 38873 Unit(s) IV Push <User Schedule>  folic acid 1 milliGRAM(s) Oral daily  furosemide    Tablet 80 milliGRAM(s) Oral daily  glucagon  Injectable 1 milliGRAM(s) IntraMuscular once  hydrALAZINE 25 milliGRAM(s) Oral three times a day  insulin glargine Injectable (LANTUS) 4 Unit(s) SubCutaneous at bedtime  latanoprost 0.005% Ophthalmic Solution 1 Drop(s) Both EYES at bedtime  levothyroxine 25 MICROGram(s) Oral daily  LORazepam     Tablet 0.5 milliGRAM(s) Oral <User Schedule>  metoprolol succinate ER 50 milliGRAM(s) Oral daily  metroNIDAZOLE  IVPB 500 milliGRAM(s) IV Intermittent every 8 hours  pantoprazole  Injectable 40 milliGRAM(s) IV Push every 12 hours  sertraline 50 milliGRAM(s) Oral daily  sucralfate 1 Gram(s) Oral four times a day      MEDICATIONS  (PRN):  dextrose Oral Gel 15 Gram(s) Oral once PRN Blood Glucose LESS THAN 70 milliGRAM(s)/deciliter  haloperidol    Injectable 0.5 milliGRAM(s) IntraMuscular every 8 hours PRN Agitation  ondansetron Injectable 4 milliGRAM(s) IV Push every 6 hours PRN Nausea and/or Vomiting       Medications up to date at time of exam.    ROS; No fever, chills, cough, nasal congestion.   PHYSICAL EXAMINATION:  Vital Signs Last 24 Hrs  T(C): 36.5 (31 Jul 2023 11:25), Max: 37.5 (31 Jul 2023 07:56)  T(F): 97.7 (31 Jul 2023 11:25), Max: 99.5 (31 Jul 2023 07:56)  HR: 104 (31 Jul 2023 11:25) (92 - 104)  BP: 171/77 (31 Jul 2023 11:25) (131/61 - 171/77)  BP(mean): --  RR: 18 (31 Jul 2023 11:25) (18 - 18)  SpO2: 93% (31 Jul 2023 11:25) (93% - 100%)    Parameters below as of 31 Jul 2023 11:25  Patient On (Oxygen Delivery Method): nasal cannula  O2 Flow (L/min): 4     (if applicable)    General : Alert and oriented. No acute distress.     HEENT: Head is normocephalic and atraumatic. No nasal tenderness. Partially Blind. Mucous membranes are moist.     NECK: Supple, no palpable adenopathy.    LUNGS: Non labored. No wheezing. No use of accessory muscle.     HEART: S1 S2 Regular rate and no click/ rub.     ABDOMEN: Soft, nontender, and nondistended.  Active bowel sounds.     EXTREMITIES: Left BKA , wears prosthesis outpatient.     NEUROLOGIC: Awake, alert, oriented.     SKIN: Warm and moist . Non diaphoretic.       LABS:                        7.8    5.08  )-----------( 95       ( 31 Jul 2023 09:00 )             25.3     07-31    139  |  100  |  88<H>  ----------------------------<  88  4.3   |  24  |  13.80<H>    Ca    8.3<L>      31 Jul 2023 09:00  Phos  6.1     07-31  Mg     3.0     07-31    TPro  6.6  /  Alb  3.1<L>  /  TBili  0.7  /  DBili  x   /  AST  26  /  ALT  41  /  AlkPhos  97  07-30      Urinalysis Basic - ( 31 Jul 2023 09:00 )    Color: x / Appearance: x / SG: x / pH: x  Gluc: 88 mg/dL / Ketone: x  / Bili: x / Urobili: x   Blood: x / Protein: x / Nitrite: x   Leuk Esterase: x / RBC: x / WBC x   Sq Epi: x / Non Sq Epi: x / Bacteria: x                      MICROBIOLOGY: (if applicable)    RADIOLOGY & ADDITIONAL STUDIES:  EKG:   CXR:  ECHO:    IMPRESSION: 62y Male 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      Osteoarthritis      Vision loss of left eye  blind      ESRD on hemodialysis  T/Th/S      Cataract  both eyes - hx of sx done      BPH (benign prostatic hyperplasia)      UTI (urinary tract infection)  hx of      Bladder mass  hx of      H/O hematuria      Osteoporosis      Vision loss of right eye  decreased      Depression      Chronic GERD      Osteomyelitis of vertebra      CHF (congestive heart failure)      Below knee amputation status, left  2012- pt is wearing prostesis      History of right cataract extraction      History of left cataract extraction      S/P arteriovenous (AV) fistula creation  right arm brachiocephalic arteriovenous fistula on 11/08/2018      H/O hematuria  s/p bladder bx and fulguration 2/25/2020      H/O transurethral destruction of bladder lesion  2020      History of excision of mass  back mass on 03/31/2021    Impression: This is a 63 Y/O Male from [facility] with ESRD on HD ( T-Th-Sat ). Current smoker . Presented to ED due to Missed Dialysis, last HD 07-22-23 . Per Patient stated that he often missed HD sessions due to Mild left leg pain and right leg swelling . S/p RRT for SOB with Hypoxia due to Acute Hypoxic Respiratory Failure secondary to Pulmonary edema/ Fluid overload due to Missed Dialysis . CT Abdomen with Small Bilateral Pleural Effusions, Rt Lower Lung with groundglass opacity. No bowel obstruction. Small Gall Stone.   Vomiting due to diabetic gastroparesis unlikely SBO      Suggestions:  O2 saturation 92% with O2 supplement. Continue Oxygen supplementation 4L NC. Monitor O2 saturation trend.   Reinforced to the patient the importance of 3x aweek compliance to Dialysis .  Dialysis per Nephro.   Reinforced  the importance of smoking cessation.  Continue Duoneb via nebulization Q 6 Hours.  Continue Symbicort 160-4.5 mcg 2 Puffs Twice daily.  No need to drain Bilateral Pleural Effusions at this time. On lasix 80 mg Oral Daily.   On Cefepime 1 Gm IVPB Daily.   Aspiration precautions with HOB elevation.        Surgery following    Time of Visit:  Patient seen and examined.     MEDICATIONS  (STANDING):  albuterol/ipratropium for Nebulization 3 milliLiter(s) Nebulizer every 6 hours  amLODIPine   Tablet 10 milliGRAM(s) Oral daily  aspirin enteric coated 81 milliGRAM(s) Oral daily  atorvastatin 40 milliGRAM(s) Oral at bedtime  budesonide 160 MICROgram(s)/formoterol 4.5 MICROgram(s) Inhaler 2 Puff(s) Inhalation two times a day  cefepime   IVPB 1000 milliGRAM(s) IV Intermittent every 24 hours  dextrose 5%. 1000 milliLiter(s) (50 mL/Hr) IV Continuous <Continuous>  dextrose 5%. 1000 milliLiter(s) (100 mL/Hr) IV Continuous <Continuous>  dextrose 50% Injectable 25 Gram(s) IV Push once  dextrose 50% Injectable 12.5 Gram(s) IV Push once  dextrose 50% Injectable 25 Gram(s) IV Push once  epoetin beverley (PROCRIT) Injectable 16500 Unit(s) IV Push <User Schedule>  folic acid 1 milliGRAM(s) Oral daily  furosemide    Tablet 80 milliGRAM(s) Oral daily  glucagon  Injectable 1 milliGRAM(s) IntraMuscular once  hydrALAZINE 25 milliGRAM(s) Oral three times a day  insulin glargine Injectable (LANTUS) 4 Unit(s) SubCutaneous at bedtime  latanoprost 0.005% Ophthalmic Solution 1 Drop(s) Both EYES at bedtime  levothyroxine 25 MICROGram(s) Oral daily  LORazepam     Tablet 0.5 milliGRAM(s) Oral <User Schedule>  metoprolol succinate ER 50 milliGRAM(s) Oral daily  metroNIDAZOLE  IVPB 500 milliGRAM(s) IV Intermittent every 8 hours  pantoprazole  Injectable 40 milliGRAM(s) IV Push every 12 hours  sertraline 50 milliGRAM(s) Oral daily  sucralfate 1 Gram(s) Oral four times a day      MEDICATIONS  (PRN):  dextrose Oral Gel 15 Gram(s) Oral once PRN Blood Glucose LESS THAN 70 milliGRAM(s)/deciliter  haloperidol    Injectable 0.5 milliGRAM(s) IntraMuscular every 8 hours PRN Agitation  ondansetron Injectable 4 milliGRAM(s) IV Push every 6 hours PRN Nausea and/or Vomiting       Medications up to date at time of exam.    ROS; No fever, chills, cough, nasal congestion.   PHYSICAL EXAMINATION:  Vital Signs Last 24 Hrs  T(C): 36.5 (31 Jul 2023 11:25), Max: 37.5 (31 Jul 2023 07:56)  T(F): 97.7 (31 Jul 2023 11:25), Max: 99.5 (31 Jul 2023 07:56)  HR: 104 (31 Jul 2023 11:25) (92 - 104)  BP: 171/77 (31 Jul 2023 11:25) (131/61 - 171/77)  BP(mean): --  RR: 18 (31 Jul 2023 11:25) (18 - 18)  SpO2: 93% (31 Jul 2023 11:25) (93% - 100%)    Parameters below as of 31 Jul 2023 11:25  Patient On (Oxygen Delivery Method): nasal cannula  O2 Flow (L/min): 4     (if applicable)    General : Alert and oriented. No acute distress.     HEENT: Head is normocephalic and atraumatic. No nasal tenderness. Partially Blind. Mucous membranes are moist.     NECK: Supple, no palpable adenopathy.    LUNGS: Non labored. No wheezing. No use of accessory muscle.     HEART: S1 S2 Regular rate and no click/ rub.     ABDOMEN: Soft, nontender, and nondistended.  Active bowel sounds.     EXTREMITIES: Left BKA , wears prosthesis outpatient.     NEUROLOGIC: Awake, alert, oriented.     SKIN: Warm and moist . Non diaphoretic.       LABS:                        7.8    5.08  )-----------( 95       ( 31 Jul 2023 09:00 )             25.3     07-31    139  |  100  |  88<H>  ----------------------------<  88  4.3   |  24  |  13.80<H>    Ca    8.3<L>      31 Jul 2023 09:00  Phos  6.1     07-31  Mg     3.0     07-31    TPro  6.6  /  Alb  3.1<L>  /  TBili  0.7  /  DBili  x   /  AST  26  /  ALT  41  /  AlkPhos  97  07-30      Urinalysis Basic - ( 31 Jul 2023 09:00 )    Color: x / Appearance: x / SG: x / pH: x  Gluc: 88 mg/dL / Ketone: x  / Bili: x / Urobili: x   Blood: x / Protein: x / Nitrite: x   Leuk Esterase: x / RBC: x / WBC x   Sq Epi: x / Non Sq Epi: x / Bacteria: x                      MICROBIOLOGY: (if applicable)    RADIOLOGY & ADDITIONAL STUDIES:  EKG:   CXR:  ECHO:    IMPRESSION: 62y Male 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      Osteoarthritis      Vision loss of left eye  blind      ESRD on hemodialysis  T/Th/S      Cataract  both eyes - hx of sx done      BPH (benign prostatic hyperplasia)      UTI (urinary tract infection)  hx of      Bladder mass  hx of      H/O hematuria      Osteoporosis      Vision loss of right eye  decreased      Depression      Chronic GERD      Osteomyelitis of vertebra      CHF (congestive heart failure)      Below knee amputation status, left  2012- pt is wearing prostesis      History of right cataract extraction      History of left cataract extraction      S/P arteriovenous (AV) fistula creation  right arm brachiocephalic arteriovenous fistula on 11/08/2018      H/O hematuria  s/p bladder bx and fulguration 2/25/2020      H/O transurethral destruction of bladder lesion  2020      History of excision of mass  back mass on 03/31/2021    Impression: This is a 63 Y/O Male from [facility] with ESRD on HD ( T-Th-Sat ). Current smoker . Presented to ED due to Missed Dialysis, last HD 07-22-23 . Per Patient stated that he often missed HD sessions due to Mild left leg pain and right leg swelling . S/p RRT for SOB with Hypoxia due to Acute Hypoxic Respiratory Failure secondary to Pulmonary edema/ Fluid overload due to Missed Dialysis . CT Abdomen with Small Bilateral Pleural Effusions, Rt Lower Lung with groundglass opacity. No bowel obstruction. Small Gall Stone.   Vomiting due to diabetic gastroparesis vs acute cholecystitis  unlikely SBO      Suggestions:  O2 saturation 92% with O2 supplement. Continue Oxygen supplementation 4L NC. Monitor O2 saturation trend.   Reinforced to the patient the importance of 3x aweek compliance to Dialysis .  Dialysis per Nephro.   Reinforced  the importance of smoking cessation.  Continue Duoneb via nebulization Q 6 Hours.  Continue Symbicort 160-4.5 mcg 2 Puffs Twice daily.  No need to drain Bilateral Pleural Effusions at this time. On lasix 80 mg Oral Daily.   On Cefepime 1 Gm IVPB Daily.   Aspiration precautions with HOB elevation.        Surgery following

## 2023-07-31 NOTE — PROGRESS NOTE ADULT - ASSESSMENT
# ESRD. he is grossly fluid overloaded.  continue TTS schedule   Encouraged to stay for the full treatment time   # anemia of CKD. epogen on HD. transfuse for HBG <7  # RENAL OSTEODYSTROPHY .cont  SEVELAMER  # HTN. cont current medications . UF at HD

## 2023-07-31 NOTE — CONSULT NOTE ADULT - SUBJECTIVE AND OBJECTIVE BOX
[facility]    Patient is a 62y old  Male who presents with a chief complaint of Missed dialysis (31 Jul 2023 13:20)    HPI:  Patient is 62 year old male from [facility], walks with RW, with PMH of ESRD on HD (TTS), BKA- L w/prosthetic leg, DM, Left eye blindness, CHF, CAD, HTN, HLD, osteoporosis, bronchitis, current smoker, and anemia who presents with complaint of missed dialysis. Last HD 7/22. Pt states he often missed HD sessions.  patient states he missed this HD session due to mild pain in left leg, patient remains able to ambulate. Pt complains of right leg swelling and states he does not make any urine. Patient states he was having mild shortness of breath.  Pt denies any fever, chills, N/V/D, constipation, CP, numbness or tingling (26 Jul 2023 19:20)        PMH/PSH:  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      Osteoarthritis      Vision loss of left eye  blind      ESRD on hemodialysis  T/Th/S      Cataract  both eyes - hx of sx done      BPH (benign prostatic hyperplasia)      UTI (urinary tract infection)  hx of      Bladder mass  hx of      H/O hematuria      Osteoporosis      Vision loss of right eye  decreased      Depression      Chronic GERD      Osteomyelitis of vertebra      CHF (congestive heart failure)      Below knee amputation status, left  2012- pt is wearing prostesis      History of right cataract extraction      History of left cataract extraction      S/P arteriovenous (AV) fistula creation  right arm brachiocephalic arteriovenous fistula on 11/08/2018      H/O hematuria  s/p bladder bx and fulguration 2/25/2020      H/O transurethral destruction of bladder lesion  2020      History of excision of mass  back mass on 03/31/2021            FH:  FAMILY HISTORY:  Family history of cirrhosis of liver (Mother)    Family history of renal failure (Sibling)    Family history of hypertension (Sibling)    Family history of diabetes mellitus (Sibling)    History of substance abuse in sibling (Sibling)          MEDS:  MEDICATIONS  (STANDING):  albuterol/ipratropium for Nebulization 3 milliLiter(s) Nebulizer every 6 hours  amLODIPine   Tablet 10 milliGRAM(s) Oral daily  aspirin enteric coated 81 milliGRAM(s) Oral daily  atorvastatin 40 milliGRAM(s) Oral at bedtime  budesonide 160 MICROgram(s)/formoterol 4.5 MICROgram(s) Inhaler 2 Puff(s) Inhalation two times a day  cefepime   IVPB 1000 milliGRAM(s) IV Intermittent every 24 hours  dextrose 5%. 1000 milliLiter(s) (50 mL/Hr) IV Continuous <Continuous>  dextrose 5%. 1000 milliLiter(s) (100 mL/Hr) IV Continuous <Continuous>  dextrose 50% Injectable 25 Gram(s) IV Push once  dextrose 50% Injectable 25 Gram(s) IV Push once  dextrose 50% Injectable 12.5 Gram(s) IV Push once  epoetin beverley (PROCRIT) Injectable 90784 Unit(s) IV Push <User Schedule>  folic acid 1 milliGRAM(s) Oral daily  furosemide    Tablet 80 milliGRAM(s) Oral daily  glucagon  Injectable 1 milliGRAM(s) IntraMuscular once  hydrALAZINE 25 milliGRAM(s) Oral three times a day  insulin glargine Injectable (LANTUS) 4 Unit(s) SubCutaneous at bedtime  latanoprost 0.005% Ophthalmic Solution 1 Drop(s) Both EYES at bedtime  levothyroxine 25 MICROGram(s) Oral daily  LORazepam     Tablet 0.5 milliGRAM(s) Oral <User Schedule>  metoprolol succinate ER 50 milliGRAM(s) Oral daily  metroNIDAZOLE  IVPB 500 milliGRAM(s) IV Intermittent every 8 hours  pantoprazole  Injectable 40 milliGRAM(s) IV Push every 12 hours  sertraline 50 milliGRAM(s) Oral daily  sucralfate 1 Gram(s) Oral four times a day    MEDICATIONS  (PRN):  dextrose Oral Gel 15 Gram(s) Oral once PRN Blood Glucose LESS THAN 70 milliGRAM(s)/deciliter  haloperidol    Injectable 0.5 milliGRAM(s) IntraMuscular every 8 hours PRN Agitation  ondansetron Injectable 4 milliGRAM(s) IV Push every 6 hours PRN Nausea and/or Vomiting    Allergies    No Known Drug Allergies  fish (Rash)  liver (Anaphylaxis)    Intolerances          ROS: Limited ROS due to patient refusal to participate in full exam.   ______________________________________________________________________  PHYSICAL EXAM:  T(C): 36.5 (07-31-23 @ 11:25), Max: 37.5 (07-31-23 @ 07:56)  HR: 104 (07-31-23 @ 11:25)  BP: 171/77 (07-31-23 @ 11:25)  RR: 18 (07-31-23 @ 11:25)  SpO2: 93% (07-31-23 @ 11:25)  Wt(kg): --      GEN: NAD  HEENT: legally blind both eyes, clouded pupils, neck supple, dry mucous membranes  PULM: LSCTAB, no wheezing, rales, or rhonchi  CV: RRR, no m/r/b  GI: Soft, NT, ND; +BS in all four quadrants, no ascites, no [place]'s sign  MSK: BKA to LLE  NEURO: A&O x 3, no gross deficits  ______________________________________________________________________  LABS:                        7.8    5.08  )-----------( 95       ( 31 Jul 2023 09:00 )             25.3     07-31    139  |  100  |  88<H>  ----------------------------<  88  4.3   |  24  |  13.80<H>    Ca    8.3<L>      31 Jul 2023 09:00  Phos  6.1     07-31  Mg     3.0     07-31    TPro  6.6  /  Alb  3.1<L>  /  TBili  0.7  /  DBili  x   /  AST  26  /  ALT  41  /  AlkPhos  97  07-30    LIVER FUNCTIONS - ( 30 Jul 2023 03:07 )  Alb: 3.1 g/dL / Pro: 6.6 g/dL / ALK PHOS: 97 U/L / ALT: 41 U/L DA / AST: 26 U/L / GGT: x             ____________________________________________    IMAGING:      < from: NM Hepatobiliary Imaging w/ RX (07.31.23 @ 10:44) >  M HEPATOBILIARY IMG W RX   ORDERED BY: SHAKIR MAC     PROCEDURE DATE:  07/31/2023          INTERPRETATION:  CLINICAL INFORMATION: 62-year-old man with RIGHT UPPER   quadrant pain referred for evaluation of acute cholecystitis.    DURATION of DYNAMIC SERIES: 45 minutes  RADIOPHARMACEUTICAL: 3.2 mCi Tc-99m-Mebrofenin, I.V.    TECHNIQUE: Dynamic imaging of the anterior abdomen was performed   following radiopharmaceutical injection. Patient declined static images.    COMPARISON: None    OTHER STUDIES USED FOR CORRELATION: CT abdomen/pelvis 7/30/2023    FINDINGS: There is prompt, homogeneous uptake of radiopharmaceutical by   the hepatocytes. Activity is seen in the gallbladder at approximately 15   minutes. Patient declined further imaging before bowel was visualized.   There is fair clearance of activity from the liver by the end of the   obtained images..    IMPRESSION: Patient declined to complete the full exam and bowel was not   visualized on the available images. Otherwise unremarkable exam with no   evidence of acute cholecystitis or biliary obstruction.    < end of copied text >

## 2023-07-31 NOTE — PROGRESS NOTE ADULT - SUBJECTIVE AND OBJECTIVE BOX
INTERVAL HPI/OVERNIGHT EVENTS:  Pt resting comfortably in bed. No acute complaints. Keep NPO. Admits to vomiting last night. Admits to continued abd pain, slightly improved.     MEDICATIONS  (STANDING):  albuterol/ipratropium for Nebulization 3 milliLiter(s) Nebulizer every 6 hours  amLODIPine   Tablet 10 milliGRAM(s) Oral daily  aspirin enteric coated 81 milliGRAM(s) Oral daily  atorvastatin 40 milliGRAM(s) Oral at bedtime  budesonide 160 MICROgram(s)/formoterol 4.5 MICROgram(s) Inhaler 2 Puff(s) Inhalation two times a day  cefepime   IVPB 1000 milliGRAM(s) IV Intermittent every 24 hours  dextrose 5%. 1000 milliLiter(s) (50 mL/Hr) IV Continuous <Continuous>  dextrose 5%. 1000 milliLiter(s) (100 mL/Hr) IV Continuous <Continuous>  dextrose 50% Injectable 25 Gram(s) IV Push once  dextrose 50% Injectable 25 Gram(s) IV Push once  dextrose 50% Injectable 12.5 Gram(s) IV Push once  epoetin beverley (PROCRIT) Injectable 42892 Unit(s) IV Push <User Schedule>  folic acid 1 milliGRAM(s) Oral daily  furosemide    Tablet 80 milliGRAM(s) Oral daily  glucagon  Injectable 1 milliGRAM(s) IntraMuscular once  hydrALAZINE 25 milliGRAM(s) Oral three times a day  insulin glargine Injectable (LANTUS) 4 Unit(s) SubCutaneous at bedtime  latanoprost 0.005% Ophthalmic Solution 1 Drop(s) Both EYES at bedtime  levothyroxine 25 MICROGram(s) Oral daily  LORazepam     Tablet 0.5 milliGRAM(s) Oral <User Schedule>  metoprolol succinate ER 50 milliGRAM(s) Oral daily  metroNIDAZOLE  IVPB 500 milliGRAM(s) IV Intermittent every 8 hours  pantoprazole  Injectable 40 milliGRAM(s) IV Push every 12 hours  sertraline 50 milliGRAM(s) Oral daily  sucralfate 1 Gram(s) Oral four times a day    MEDICATIONS  (PRN):  dextrose Oral Gel 15 Gram(s) Oral once PRN Blood Glucose LESS THAN 70 milliGRAM(s)/deciliter  haloperidol    Injectable 0.5 milliGRAM(s) IntraMuscular every 8 hours PRN Agitation  ondansetron Injectable 4 milliGRAM(s) IV Push every 6 hours PRN Nausea and/or Vomiting      Vital Signs Last 24 Hrs  T(C): 37.5 (31 Jul 2023 07:56), Max: 37.5 (31 Jul 2023 07:56)  T(F): 99.5 (31 Jul 2023 07:56), Max: 99.5 (31 Jul 2023 07:56)  HR: 100 (31 Jul 2023 07:56) (80 - 100)  BP: 149/83 (31 Jul 2023 07:56) (131/61 - 189/86)  BP(mean): --  RR: 18 (31 Jul 2023 07:56) (18 - 18)  SpO2: 96% (31 Jul 2023 07:56) (95% - 100%)    Parameters below as of 31 Jul 2023 07:56  Patient On (Oxygen Delivery Method): nasal cannula  O2 Flow (L/min): 4      Physical:  General: A&Ox3. NAD.  Resp: unlabored breathing. Equal chest rise bilaterally.  Abdomen: Soft nondistended, RUQ fullness palpated. C/o minimal tenderness localized to RUQ. No voluntary guarding, no rebound tenderness, no palpable masses.      I&O's Detail      LABS:                        7.8    5.08  )-----------( 95       ( 31 Jul 2023 09:00 )             25.3             07-31    139  |  100  |  88<H>  ----------------------------<  88  4.3   |  24  |  13.80<H>    Ca    8.3<L>      31 Jul 2023 09:00  Phos  6.1     07-31  Mg     3.0     07-31    TPro  6.6  /  Alb  3.1<L>  /  TBili  0.7  /  DBili  x   /  AST  26  /  ALT  41  /  AlkPhos  97  07-30

## 2023-07-31 NOTE — PROGRESS NOTE ADULT - PROBLEM SELECTOR PLAN 5
Continue with home dose statin In the setting of ESRD  Continue Shahrzad Mosher, Nephrology, following  Recommendations appreciated In the setting of ESRD  improved with dialysis  Trend Phoroscoe Mosher, Nephrology, following  Recommendations appreciated Continue home meds Hydralazine, Metoprolol and Norvasc at home  Continue HD  Monitor BP  DASH diet In the setting of ESRD vs possible gastric ulcer  hgb today 7.8  Continue Epogen with dialysis  Continue Iron tabs  Continue protonix 40 mg bid  Continue sucralfate 1g qid  GI following, no endoscopy at this time In the setting of ESRD vs possible gastric ulcer  s/p 1 unit prbc  hgb today 7.8  Continue Epogen with dialysis  Continue Iron tabs  Continue protonix 40 mg bid  Continue sucralfate 1g qid  transfuse if hgb <7  maintain type and screen every 3 days  GI following, no endoscopy at this time

## 2023-07-31 NOTE — PROGRESS NOTE ADULT - PROBLEM SELECTOR PLAN 4
Controlled in the community as evidenced by A1c 6.1  Takes 4U Lantus QHS at home  Frustrated with frequent interruptions and refusing care  Continue home Lantus regimen  Finger stick QHS  Check glucose with AM labs In the setting of HD  Hgb 7.0, consider transfusion with next HD session  Continue  Epogen with dialysis  Continue Iron tabs In the setting of HD vs possible gastric ulcer  hgb today 7.8  Continue Epogen with dialysis  Continue Iron tabs  Continue protonix 40 mg bid  GI following, no endoscopy at this time In the setting of ESRD vs possible gastric ulcer  hgb today 7.8  Continue Epogen with dialysis  Continue Iron tabs  Continue protonix 40 mg bid  GI following, no endoscopy at this time pt p/w intractable Vomiting and coffee ground emesis  concern for possible SBO vs Gastric ulcer  CT A/P was neg. for SBO  switch to IV protonix 40 mg BID  c/w reglan 10 mg q8h PRN  NPO for now  monitor CBC  Surgery following, no surgery  GI following, no endoscopy pt p/w intractable Vomiting and coffee ground emesis  concern for possible SBO vs Gastric ulcer  CT A/P was neg. for SBO  Patient also had a BM on 7/31  switch to IV protonix 40 mg BID  c/w reglan 10 mg q8h PRN  NPO for now  monitor CBC  Surgery following, no surgery  GI following, no endoscopy pt p/w intractable Vomiting and coffee ground emesis  concern for possible SBO vs Gastric ulcer  CT A/P was neg. for SBO  Patient also had a BM on 7/31  switch to IV protonix 40 mg BID  c/w zofran PRN  NPO for now  monitor CBC  Surgery following, no surgery  GI following, no endoscopy

## 2023-07-31 NOTE — PROGRESS NOTE ADULT - ASSESSMENT
61 y/o male w/ multiple comorbidities a/w missed dialysis with r/o acute nancy      Plan   - f/u HIDA result 61 y/o male w/ multiple comorbidities a/w missed dialysis with r/o acute nancy  VSS, afebrile, no leukocytosis    Plan   - f/u HIDA scan  - f/u CMP- trend LFTs/Tbili  - follow GI recommendations  - HS as per nephrology  - remainder of care as per primary team

## 2023-07-31 NOTE — PROGRESS NOTE ADULT - SUBJECTIVE AND OBJECTIVE BOX
DATE OF SERVICE: 07-31-23    Patient denies chest pain c/o nausea   Review of symptoms otherwise negative.    albuterol/ipratropium for Nebulization 3 milliLiter(s) Nebulizer every 6 hours  amLODIPine   Tablet 10 milliGRAM(s) Oral daily  aspirin enteric coated 81 milliGRAM(s) Oral daily  atorvastatin 40 milliGRAM(s) Oral at bedtime  budesonide 160 MICROgram(s)/formoterol 4.5 MICROgram(s) Inhaler 2 Puff(s) Inhalation two times a day  cefepime   IVPB 1000 milliGRAM(s) IV Intermittent every 24 hours  dextrose 5%. 1000 milliLiter(s) IV Continuous <Continuous>  dextrose 5%. 1000 milliLiter(s) IV Continuous <Continuous>  dextrose 50% Injectable 25 Gram(s) IV Push once  dextrose 50% Injectable 12.5 Gram(s) IV Push once  dextrose 50% Injectable 25 Gram(s) IV Push once  dextrose Oral Gel 15 Gram(s) Oral once PRN  epoetin beverley (PROCRIT) Injectable 77710 Unit(s) IV Push <User Schedule>  folic acid 1 milliGRAM(s) Oral daily  furosemide    Tablet 80 milliGRAM(s) Oral daily  glucagon  Injectable 1 milliGRAM(s) IntraMuscular once  haloperidol    Injectable 0.5 milliGRAM(s) IntraMuscular every 8 hours PRN  hydrALAZINE 25 milliGRAM(s) Oral three times a day  insulin glargine Injectable (LANTUS) 4 Unit(s) SubCutaneous at bedtime  latanoprost 0.005% Ophthalmic Solution 1 Drop(s) Both EYES at bedtime  levothyroxine 25 MICROGram(s) Oral daily  LORazepam     Tablet 0.5 milliGRAM(s) Oral <User Schedule>  metoprolol succinate ER 50 milliGRAM(s) Oral daily  metroNIDAZOLE  IVPB 500 milliGRAM(s) IV Intermittent every 8 hours  ondansetron Injectable 4 milliGRAM(s) IV Push every 6 hours PRN  pantoprazole  Injectable 40 milliGRAM(s) IV Push every 12 hours  sertraline 50 milliGRAM(s) Oral daily  sucralfate 1 Gram(s) Oral four times a day                            7.8    5.08  )-----------( 95 ( 31 Jul 2023 09:00 )             25.3       Hemoglobin: 7.8 g/dL (07-31 @ 09:00)  Hemoglobin: 8.0 g/dL (07-30 @ 03:07)  Hemoglobin: 6.5 g/dL (07-29 @ 11:12)  Hemoglobin: 7.0 g/dL (07-28 @ 10:35)  Hemoglobin: 7.2 g/dL (07-26 @ 14:10)      07-31    139  |  100  |  88<H>  ----------------------------<  88  4.3   |  24  |  13.80<H>    Ca    8.3<L>      31 Jul 2023 09:00  Phos  6.1     07-31  Mg     3.0     07-31    TPro  6.6  /  Alb  3.1<L>  /  TBili  0.7  /  DBili  x   /  AST  26  /  ALT  41  /  AlkPhos  97  07-30    Creatinine Trend: 13.80<--, 11.30<--, 16.60<--, 15.60<--, 15.80<--, 17.20<--    COAGS:           T(C): 36.5 (07-31-23 @ 11:25), Max: 37.5 (07-31-23 @ 07:56)  HR: 104 (07-31-23 @ 11:25) (92 - 104)  BP: 171/77 (07-31-23 @ 11:25) (131/61 - 171/77)  RR: 18 (07-31-23 @ 11:25) (18 - 18)  SpO2: 93% (07-31-23 @ 11:25) (93% - 100%)  Wt(kg): --    I&O's Summary    HEENT:  (-)icterus (-)pallor  CV: N S1 S2 1/6 DIANA (+)2 Pulses B/l  Resp:  Clear to ausculatation B/L, normal effort  GI: (+) BS Soft, NT, ND  Lymph:  (-)Edema, (-)obvious lymphadenopathy  Skin: Warm to touch, Normal turgor  Psych: Appropriate mood and affect         ASSESSMENT/PLAN: 	62y Male  Mateus Murray, walks with RW, with PMH of ESRD on HD (TTS), BKA- L w/prosthetic leg, DM, Left eye blindness, CHF,, HTN, HLD, EF 45-50%, normal perfusion 4/23 osteoporosis, bronchitis, current smoker, and anemia who presents with complaint of missed dialysis.     # CHF  - Due to missed HD  - HD per renal  -    # Positive trop  - nothing to suggest ACS.  His trop has been higher in the past and demonstrated normal perfusion on Stress 4/23  - ECHO pending     # Noncompliance  - Behavioral health f/u    # Smoking  - Cessation stressed    # HTN  - Suspect BP will improve once euvolemic     # N/V  - Surgery f/u    Dominic Still MD, [facility]  BEEPER [phone]

## 2023-07-31 NOTE — PROGRESS NOTE ADULT - SUBJECTIVE AND OBJECTIVE BOX
Patient is a 62y old  Male who presents with a chief complaint of Missed dialysis (31 Jul 2023 09:45)    PATIENT IS SEEN AND EXAMINED IN MEDICAL FLOOR.  MARIIT [    ]    JEREMY [   ]      GT [   ]    ALLERGIES:  No Known Drug Allergies  fish (Rash)  liver (Anaphylaxis)      Daily     Daily     VITALS:    Vital Signs Last 24 Hrs  T(C): 37.5 (31 Jul 2023 07:56), Max: 37.5 (31 Jul 2023 07:56)  T(F): 99.5 (31 Jul 2023 07:56), Max: 99.5 (31 Jul 2023 07:56)  HR: 100 (31 Jul 2023 07:56) (80 - 100)  BP: 149/83 (31 Jul 2023 07:56) (131/61 - 189/86)  BP(mean): --  RR: 18 (31 Jul 2023 07:56) (18 - 18)  SpO2: 96% (31 Jul 2023 07:56) (95% - 100%)    Parameters below as of 31 Jul 2023 07:56  Patient On (Oxygen Delivery Method): nasal cannula  O2 Flow (L/min): 4      LABS:    CBC Full  -  ( 31 Jul 2023 09:00 )  WBC Count : 5.08 K/uL  RBC Count : 2.92 M/uL  Hemoglobin : 7.8 g/dL  Hematocrit : 25.3 %  Platelet Count - Automated : 95 K/uL  Mean Cell Volume : 86.6 fl  Mean Cell Hemoglobin : 26.7 pg  Mean Cell Hemoglobin Concentration : 30.8 gm/dL  Auto Neutrophil # : x  Auto Lymphocyte # : x  Auto Monocyte # : x  Auto Eosinophil # : x  Auto Basophil # : x  Auto Neutrophil % : x  Auto Lymphocyte % : x  Auto Monocyte % : x  Auto Eosinophil % : x  Auto Basophil % : x      07-31    139  |  100  |  88<H>  ----------------------------<  88  4.3   |  24  |  13.80<H>    Ca    8.3<L>      31 Jul 2023 09:00  Phos  6.1     07-31  Mg     3.0     07-31    TPro  6.6  /  Alb  3.1<L>  /  TBili  0.7  /  DBili  x   /  AST  26  /  ALT  41  /  AlkPhos  97  07-30    CAPILLARY BLOOD GLUCOSE      POCT Blood Glucose.: 90 mg/dL (31 Jul 2023 07:43)  POCT Blood Glucose.: 76 mg/dL (30 Jul 2023 21:28)  POCT Blood Glucose.: 78 mg/dL (30 Jul 2023 12:10)        LIVER FUNCTIONS - ( 30 Jul 2023 03:07 )  Alb: 3.1 g/dL / Pro: 6.6 g/dL / ALK PHOS: 97 U/L / ALT: 41 U/L DA / AST: 26 U/L / GGT: x           Creatinine Trend: 13.80<--, 11.30<--, 16.60<--, 15.60<--, 15.80<--, 17.20<--  I&O's Summary          .Blood Blood-Peripheral  05-28 @ 14:07   No Growth Final  --  --          MEDICATIONS:    MEDICATIONS  (STANDING):  albuterol/ipratropium for Nebulization 3 milliLiter(s) Nebulizer every 6 hours  amLODIPine   Tablet 10 milliGRAM(s) Oral daily  aspirin enteric coated 81 milliGRAM(s) Oral daily  atorvastatin 40 milliGRAM(s) Oral at bedtime  budesonide 160 MICROgram(s)/formoterol 4.5 MICROgram(s) Inhaler 2 Puff(s) Inhalation two times a day  cefepime   IVPB 1000 milliGRAM(s) IV Intermittent every 24 hours  dextrose 5%. 1000 milliLiter(s) (100 mL/Hr) IV Continuous <Continuous>  dextrose 5%. 1000 milliLiter(s) (50 mL/Hr) IV Continuous <Continuous>  dextrose 50% Injectable 25 Gram(s) IV Push once  dextrose 50% Injectable 12.5 Gram(s) IV Push once  dextrose 50% Injectable 25 Gram(s) IV Push once  epoetin beverley (PROCRIT) Injectable 80249 Unit(s) IV Push <User Schedule>  folic acid 1 milliGRAM(s) Oral daily  furosemide    Tablet 80 milliGRAM(s) Oral daily  glucagon  Injectable 1 milliGRAM(s) IntraMuscular once  hydrALAZINE 25 milliGRAM(s) Oral three times a day  insulin glargine Injectable (LANTUS) 4 Unit(s) SubCutaneous at bedtime  latanoprost 0.005% Ophthalmic Solution 1 Drop(s) Both EYES at bedtime  levothyroxine 25 MICROGram(s) Oral daily  LORazepam     Tablet 0.5 milliGRAM(s) Oral <User Schedule>  metoprolol succinate ER 50 milliGRAM(s) Oral daily  metroNIDAZOLE  IVPB 500 milliGRAM(s) IV Intermittent every 8 hours  pantoprazole  Injectable 40 milliGRAM(s) IV Push every 12 hours  sertraline 50 milliGRAM(s) Oral daily  sucralfate 1 Gram(s) Oral four times a day      MEDICATIONS  (PRN):  dextrose Oral Gel 15 Gram(s) Oral once PRN Blood Glucose LESS THAN 70 milliGRAM(s)/deciliter  haloperidol    Injectable 0.5 milliGRAM(s) IntraMuscular every 8 hours PRN Agitation  ondansetron Injectable 4 milliGRAM(s) IV Push every 6 hours PRN Nausea and/or Vomiting      REVIEW OF SYSTEMS:                           ALL ROS DONE [ X   ]    CONSTITUTIONAL:  LETHARGIC [   ], FEVER [   ], UNRESPONSIVE [   ]  CVS:  CP  [   ], SOB, [   ], PALPITATIONS [   ], DIZZYNESS [   ]  RS: COUGH [   ], SPUTUM [   ]  GI: ABDOMINAL PAIN [   ], NAUSEA [   ], VOMITINGS [   ], DIARRHEA [   ], CONSTIPATION [   ]  :  DYSURIA [   ], NOCTURIA [   ], INCREASED FREQUENCY [   ], DRIBLING [   ],  SKELETAL: PAINFUL JOINTS [   ], SWOLLEN JOINTS [   ], NECK ACHE [   ], LOW BACK ACHE [   ],  SKIN : ULCERS [   ], RASH [   ], ITCHING [   ]  CNS: HEAD ACHE [   ], DOUBLE VISION [   ], BLURRED VISION [   ], AMS / CONFUSION [   ], SEIZURES [   ], WEAKNESS [   ],TINGLING / NUMBNESS [   ]    PHYSICAL EXAMINATION:  GENERAL APPEARANCE: NO DISTRESS,    ANASARCA ++  HEENT:  NO PALLOR, NO  JVD,  NO   NODES, NECK SUPPLE  CVS: S1 +, S2 +,   RS: AEEB,  OCCASIONAL  RALES +,   CRACKLES+ AT LUNG BASES  ABD: SOFT, NT, NO, BS +  EXT: LEFT BKA  SKIN: WARM,   SKELETAL:  ROM ACCEPTABLE  CNS:  AAO X  3      RADIOLOGY :      ASSESSMENT :     Hypervolemia    No pertinent past medical history    Hypertension    Adrenal insufficiency    CKD (chronic kidney disease)    Anemia    Glaucoma    Coronary artery disease    HLD (hyperlipidemia)    Peripheral vascular disease    Spinal stenosis of lumbosacral region    Hyperparathyroidism    Diabetes mellitus    Diabetic neuropathy    Contracture of hand    Osteoarthritis    Osteoporosis    Vision loss of left eye    ESRD on hemodialysis    Cataract    BPH (benign prostatic hyperplasia)    UTI (urinary tract infection)    Bladder mass    H/O hematuria    Osteoporosis    Vision loss of right eye    Depression    Chronic GERD    Osteomyelitis of vertebra    CHF (congestive heart failure)    No significant past surgical history    Below knee amputation status, left    History of right cataract extraction    History of left cataract extraction    S/P arteriovenous (AV) fistula creation    H/O hematuria    H/O transurethral destruction of bladder lesion    History of excision of mass        PLAN:  HPI:  Patient is 62 year old male from [facility], walks with RW, with PMH of ESRD on HD (TTS), BKA- L w/prosthetic leg, DM, Left eye blindness, CHF, CAD, HTN, HLD, osteoporosis, bronchitis, current smoker, and anemia who presents with complaint of missed dialysis. Last HD 7/22. Pt states he often missed HD sessions.  patient states he missed this HD session due to mild pain in left leg, patient remains able to ambulate. Pt complains of right leg swelling and states he does not make any urine. Patient states he was having mild shortness of breath.  Pt denies any fever, chills, N/V/D, constipation, CP, numbness or tingling (26 Jul 2023 19:20)    # CASE DISCUSSED AT LENGTH WITH PATIENT'S BROTHER ARTEMIO @ [phone]. DISCUSSED REGARDING CURRENT CLINICAL CONDITION, RECOMMENDED EVALUATIONS AND INTERVENTIONS. ALL QUESTIONS ANSWERED. DISCUSSED THAT PROGNOSIS IS POOR GIVEN UNDERLYING MEDICAL CONDITIONS. ALSO DISCUSSED THAT DESPITE VERBALIZING UNDERSTANDING OF MEDICAL CONDITIONS AND RECOMMENDED INTERVENTIONS - PATIENT PERSISTENTLY REMAINS NONCOMPLIANT. TEAM HAS OFFERED PATIENT EMOTIONAL SUPPORT AND OFFERED MEDICATION FOR MOOD.    # PATIENT W/ HISTORY OF ROUTINE NONCOMPLIANCE W/ LIFE-SUSTAINING TREATMENT [HD], EVALUATIONS AND INTERVENTIONS - COUNSELLED AT LENGTH TO REMAIN COMPLIANT. D/W PATIENT THAT PROGNOSIS IS GRIM/POOR. HE VERBALIZED UNDERSTANDING. REGARDING GOC - PATIENT WISHES FOR FULL CODE. PALLIATIVE CARE CONSULTED. PSYCHIATRY CONSULTED.  - PATIENT IS ORIENTED TO PERSON, PLACE AND CIRCUMSTANCE. HE EXPRESSED THAT HE DOES GROW IMPATIENT DURING DIALYSIS SESSIONS AND HAS BEEN LEAVING SESSIONS SOONER THAN PRESCRIBED. IN DISCUSSION THAT RISKS OF DEFERRING COMPLETE HD - INCLUDE BUT ARE NOT LIMITED TO WORSENING CLINICAL CONDITION, ELECTROLYTE ABNORMALITIES, ARRHYTHMIA AND DEATH. HE VERBALIZED UNDERSTANDING. HE REFUSES TO CONSIDER A NURSING HOME WITH ONSITE HD.   - D/W PATIENT THAT REPEATEDLY REFUSING INTERVENTIONS AND ASSESSMENTS IS NOT IN LINE WITH HIS WISHES TO IMPROVE. PATIENT VERBALIZED UNDERSTANDING AND AGREEMENT. IN DISCUSSION, REGARDING POSSIBLE ETIOLOGY - PSYCHIATRY WAS CONSULTED TO DISCUSS MOOD, PATIENT DOES EXPRESS THAT HE HAS SOME DEPRESSION GIVEN HIS MEDICAL CONDITIONS. BUT HE DENIES THAT THIS IS THE ETIOLOGY FOR NONCOMPLIANCE. HE EXPLAINS THAT HE DOES NOT LIKE BEING CONFINED TO A DIALYSIS MACHINE. OFFERED FOR PATIENT TO CONSIDER NURSING HOME W/ ONSITE HD. PATIENT WISHES TO CONTINUE TO LIVE IN ASSISTED LIVING.    # ACUTE ON CHRONIC HYPOXIC RESPIRATORY FAILURE S/T PULMONARY EDEMA - S/T INCOMPLETE HD SESSIONS ; ANASARCA  # HYPERKALEMIA  # HX OF COPD + S/P RECENT MULTIFOCAL PNEUMONIA ; R/O ASPIRATION PNEUMONIA  # UNCONTROLLED HTN  # ESRD ON HD TTS - W/ HX OF NONCOMPLIANCE    - TELEMETRY  - HYDRALAZINE, METOPROLOL AND NORVASC  - LOKELMA  - SUPPLEMENTAL O2  - PLANNED FOR HD  - NEPHROLOGY CONSULT    - PLANNED FOR HD 7/26, 7/27 [INCOMPLETE SESSIONS]  - REFUSED HD ON 7/28  - UNDERWENT HD 7/29 [COMPLETE SESSION]    - [7/30] - OVERNIGHT RAPID RESPONSE S/T HYPOXIA - SELF-LIMITED - PATIENT IMPROVED S/P O2 SUPPLEMENT  - EMPIRICALLY STARTED ON CEFEPIME + FLAGYLL, F/U BCX       # RECURRENT INTRACTABLE NAUSEA/VOMITING, ? COFFEE GROUND EMESIS  # GASTROPARESIS  # HISTORY OF ESOPHAGITIS AND DUODENITIS [1/2021], HX OF GASTROPARESIS W/ EVIDENCE OF THICKENED ESOPHAGUS ON PREVIOUS CT SCAN   # PANCREAS W/ DOUBLE DUCT SIGN    - MONITORING HGB, PLACED ON PPI BID , CARAFATE QID  - PRN ANTIEMETICS  - MONITORING FOR SYMPTOMS  - WHILE ON DIET PATIENT REPEATEDLY COUNSELLED TO CHEW FOOD CAUTIOUSLY AND CONSUME SMALL BITES W/ REGULAR CLEARING WITH WATER BETWEEN BITES    - NPO  - NOTED CT A/P    - S/P RECENT PRBC TRANSFUSION     - GI CONSULT  - SURGERY CONSULT    # R/O CHOLECYSTITIS  - PLACED ON CEFEPIME, F/U BCX  - NOTED CT A/P  - F/U HIDA SCAN  - SURGERY CONSULT    # ELEVATED TROPONINS - ? DEMAND ISCHEMIA    - MONITOR ON TELEMETRY  - TREND TROPONINS  - CARDIOLOGY CONSULT    # GASTROPARESIS  # UNDERLYING DM  - SSI + FS  - COUNSELLED TO COMPLY WITH HD TO REDUCE UREMIA    # DEPRESSION  - PLACED ON ZOLOFT  - PSYCHIATRY CONSULT    # PATIENT UNDERGOING OUTPATIENT WORKUP FOR CENTRAL VENOUS STENOSIS THROUGH NEPHROLOGY TEAM  - ? s/p STENT PLACEMENT    # ANEMIA OF CKD  # HX OF PANCYTOPENIA   - TREND HGB, TRANSFUSION THRESHOLD HGB < 7; PATIENT STILL INTERMITTENTLY REFUSING BLOODWORK, COUNSELLED TO COMPLY  - TYPE AND SCREEN  - ON EPO  - DENIES RECENT HEMATEMESIS, MELENA, HEMATOCHEZIA    - S/P RECENT PRBC TRANSFUSION  - S/P PRBC TRANSFUSION 7/29    - PPI BID, CARAFATE QID    # SEVERE PROTEIN CALORIE MALNUTRITION, FAILURE TO THRIVE   -  TEMPORAL WASTING, LOSS OF MUSCLE MASS FROM SHOULDER AND HIP GIRDLE  - NUTRITIONAL SUPPLEMENT    # HLD  # PARTIALLY BLIND  # S/P LEFT BKA  # HX OF PVD  # LS SPINAL STENOSIS  # GI AND DVT PPX     Patient is a 62y old  Male who presents with a chief complaint of Missed dialysis (31 Jul 2023 09:45)    PATIENT IS SEEN AND EXAMINED IN MEDICAL FLOOR.      ALLERGIES:  No Known Drug Allergies  fish (Rash)  liver (Anaphylaxis)      VITALS:    Vital Signs Last 24 Hrs  T(C): 37.5 (31 Jul 2023 07:56), Max: 37.5 (31 Jul 2023 07:56)  T(F): 99.5 (31 Jul 2023 07:56), Max: 99.5 (31 Jul 2023 07:56)  HR: 100 (31 Jul 2023 07:56) (80 - 100)  BP: 149/83 (31 Jul 2023 07:56) (131/61 - 189/86)  BP(mean): --  RR: 18 (31 Jul 2023 07:56) (18 - 18)  SpO2: 96% (31 Jul 2023 07:56) (95% - 100%)    Parameters below as of 31 Jul 2023 07:56  Patient On (Oxygen Delivery Method): nasal cannula  O2 Flow (L/min): 4      LABS:    CBC Full  -  ( 31 Jul 2023 09:00 )  WBC Count : 5.08 K/uL  RBC Count : 2.92 M/uL  Hemoglobin : 7.8 g/dL  Hematocrit : 25.3 %  Platelet Count - Automated : 95 K/uL  Mean Cell Volume : 86.6 fl  Mean Cell Hemoglobin : 26.7 pg  Mean Cell Hemoglobin Concentration : 30.8 gm/dL  Auto Neutrophil # : x  Auto Lymphocyte # : x  Auto Monocyte # : x  Auto Eosinophil # : x  Auto Basophil # : x  Auto Neutrophil % : x  Auto Lymphocyte % : x  Auto Monocyte % : x  Auto Eosinophil % : x  Auto Basophil % : x      07-31    139  |  100  |  88<H>  ----------------------------<  88  4.3   |  24  |  13.80<H>    Ca    8.3<L>      31 Jul 2023 09:00  Phos  6.1     07-31  Mg     3.0     07-31    TPro  6.6  /  Alb  3.1<L>  /  TBili  0.7  /  DBili  x   /  AST  26  /  ALT  41  /  AlkPhos  97  07-30    CAPILLARY BLOOD GLUCOSE      POCT Blood Glucose.: 90 mg/dL (31 Jul 2023 07:43)  POCT Blood Glucose.: 76 mg/dL (30 Jul 2023 21:28)  POCT Blood Glucose.: 78 mg/dL (30 Jul 2023 12:10)        LIVER FUNCTIONS - ( 30 Jul 2023 03:07 )  Alb: 3.1 g/dL / Pro: 6.6 g/dL / ALK PHOS: 97 U/L / ALT: 41 U/L DA / AST: 26 U/L / GGT: x           Creatinine Trend: 13.80<--, 11.30<--, 16.60<--, 15.60<--, 15.80<--, 17.20<--  I&O's Summary          .Blood Blood-Peripheral  05-28 @ 14:07   No Growth Final  --  --          MEDICATIONS:    MEDICATIONS  (STANDING):  albuterol/ipratropium for Nebulization 3 milliLiter(s) Nebulizer every 6 hours  amLODIPine   Tablet 10 milliGRAM(s) Oral daily  aspirin enteric coated 81 milliGRAM(s) Oral daily  atorvastatin 40 milliGRAM(s) Oral at bedtime  budesonide 160 MICROgram(s)/formoterol 4.5 MICROgram(s) Inhaler 2 Puff(s) Inhalation two times a day  cefepime   IVPB 1000 milliGRAM(s) IV Intermittent every 24 hours  dextrose 5%. 1000 milliLiter(s) (100 mL/Hr) IV Continuous <Continuous>  dextrose 5%. 1000 milliLiter(s) (50 mL/Hr) IV Continuous <Continuous>  dextrose 50% Injectable 25 Gram(s) IV Push once  dextrose 50% Injectable 12.5 Gram(s) IV Push once  dextrose 50% Injectable 25 Gram(s) IV Push once  epoetin beverley (PROCRIT) Injectable 29558 Unit(s) IV Push <User Schedule>  folic acid 1 milliGRAM(s) Oral daily  furosemide    Tablet 80 milliGRAM(s) Oral daily  glucagon  Injectable 1 milliGRAM(s) IntraMuscular once  hydrALAZINE 25 milliGRAM(s) Oral three times a day  insulin glargine Injectable (LANTUS) 4 Unit(s) SubCutaneous at bedtime  latanoprost 0.005% Ophthalmic Solution 1 Drop(s) Both EYES at bedtime  levothyroxine 25 MICROGram(s) Oral daily  LORazepam     Tablet 0.5 milliGRAM(s) Oral <User Schedule>  metoprolol succinate ER 50 milliGRAM(s) Oral daily  metroNIDAZOLE  IVPB 500 milliGRAM(s) IV Intermittent every 8 hours  pantoprazole  Injectable 40 milliGRAM(s) IV Push every 12 hours  sertraline 50 milliGRAM(s) Oral daily  sucralfate 1 Gram(s) Oral four times a day      MEDICATIONS  (PRN):  dextrose Oral Gel 15 Gram(s) Oral once PRN Blood Glucose LESS THAN 70 milliGRAM(s)/deciliter  haloperidol    Injectable 0.5 milliGRAM(s) IntraMuscular every 8 hours PRN Agitation  ondansetron Injectable 4 milliGRAM(s) IV Push every 6 hours PRN Nausea and/or Vomiting      REVIEW OF SYSTEMS:                           ALL ROS DONE [ X   ]    CONSTITUTIONAL:  LETHARGIC [   ], FEVER [   ], UNRESPONSIVE [   ]  CVS:  CP  [   ], SOB, [   ], PALPITATIONS [   ], DIZZYNESS [   ]  RS: COUGH [   ], SPUTUM [   ]  GI: ABDOMINAL PAIN [   ], NAUSEA [   ], VOMITINGS [   ], DIARRHEA [   ], CONSTIPATION [   ]  :  DYSURIA [   ], NOCTURIA [   ], INCREASED FREQUENCY [   ], DRIBLING [   ],  SKELETAL: PAINFUL JOINTS [   ], SWOLLEN JOINTS [   ], NECK ACHE [   ], LOW BACK ACHE [   ],  SKIN : ULCERS [   ], RASH [   ], ITCHING [   ]  CNS: HEAD ACHE [   ], DOUBLE VISION [   ], BLURRED VISION [   ], AMS / CONFUSION [   ], SEIZURES [   ], WEAKNESS [   ],TINGLING / NUMBNESS [   ]    PHYSICAL EXAMINATION:  GENERAL APPEARANCE: NO DISTRESS,    ANASARCA ++  HEENT:  NO PALLOR, NO  JVD,  NO   NODES, NECK SUPPLE  CVS: S1 +, S2 +,   RS: AEEB,  OCCASIONAL  RALES +,   CRACKLES+ AT LUNG BASES  ABD: SOFT, NT, NO, BS +  EXT: LEFT BKA  SKIN: WARM,   SKELETAL:  ROM ACCEPTABLE  CNS:  AAO X  3      RADIOLOGY :      ASSESSMENT :     Hypervolemia    No pertinent past medical history    Hypertension    Adrenal insufficiency    CKD (chronic kidney disease)    Anemia    Glaucoma    Coronary artery disease    HLD (hyperlipidemia)    Peripheral vascular disease    Spinal stenosis of lumbosacral region    Hyperparathyroidism    Diabetes mellitus    Diabetic neuropathy    Contracture of hand    Osteoarthritis    Osteoporosis    Vision loss of left eye    ESRD on hemodialysis    Cataract    BPH (benign prostatic hyperplasia)    UTI (urinary tract infection)    Bladder mass    H/O hematuria    Osteoporosis    Vision loss of right eye    Depression    Chronic GERD    Osteomyelitis of vertebra    CHF (congestive heart failure)    No significant past surgical history    Below knee amputation status, left    History of right cataract extraction    History of left cataract extraction    S/P arteriovenous (AV) fistula creation    H/O hematuria    H/O transurethral destruction of bladder lesion    History of excision of mass        PLAN:  HPI:  Patient is 62 year old male from [facility], walks with RW, with PMH of ESRD on HD (TTS), BKA- L w/prosthetic leg, DM, Left eye blindness, CHF, CAD, HTN, HLD, osteoporosis, bronchitis, current smoker, and anemia who presents with complaint of missed dialysis. Last HD 7/22. Pt states he often missed HD sessions.  patient states he missed this HD session due to mild pain in left leg, patient remains able to ambulate. Pt complains of right leg swelling and states he does not make any urine. Patient states he was having mild shortness of breath.  Pt denies any fever, chills, N/V/D, constipation, CP, numbness or tingling (26 Jul 2023 19:20)    # CASE DISCUSSED AT LENGTH WITH PATIENT'S BROTHER ARTEMIO @ [phone]. DISCUSSED REGARDING CURRENT CLINICAL CONDITION, RECOMMENDED EVALUATIONS AND INTERVENTIONS. ALL QUESTIONS ANSWERED. DISCUSSED THAT PROGNOSIS IS POOR/GRIM GIVEN UNDERLYING MEDICAL CONDITIONS. ALSO DISCUSSED THAT DESPITE VERBALIZING UNDERSTANDING OF MEDICAL CONDITIONS AND RECOMMENDED INTERVENTIONS - PATIENT PERSISTENTLY REMAINS NONCOMPLIANT. TEAM HAS OFFERED PATIENT EMOTIONAL SUPPORT AND OFFERED MEDICATION FOR MOOD.  - [7/31] - BROTHER UPDATED BY TEAM    # PATIENT W/ HISTORY OF ROUTINE NONCOMPLIANCE W/ LIFE-SUSTAINING TREATMENT [HD], EVALUATIONS AND INTERVENTIONS - COUNSELLED AT LENGTH TO REMAIN COMPLIANT. D/W PATIENT THAT PROGNOSIS IS GRIM/POOR. HE VERBALIZED UNDERSTANDING. REGARDING GOC - PATIENT WISHES FOR FULL CODE. PALLIATIVE CARE CONSULTED. PSYCHIATRY CONSULTED.  - PATIENT IS ORIENTED TO PERSON, PLACE AND CIRCUMSTANCE. HE EXPRESSED THAT HE DOES GROW IMPATIENT DURING DIALYSIS SESSIONS AND HAS BEEN LEAVING SESSIONS SOONER THAN PRESCRIBED. IN DISCUSSION THAT RISKS OF DEFERRING COMPLETE HD - INCLUDE BUT ARE NOT LIMITED TO WORSENING CLINICAL CONDITION, ELECTROLYTE ABNORMALITIES, ARRHYTHMIA AND DEATH. HE VERBALIZED UNDERSTANDING. HE REFUSES TO CONSIDER A NURSING HOME WITH ONSITE HD.   - D/W PATIENT THAT REPEATEDLY REFUSING INTERVENTIONS AND ASSESSMENTS IS NOT IN LINE WITH HIS WISHES TO IMPROVE. PATIENT VERBALIZED UNDERSTANDING AND AGREEMENT. IN DISCUSSION, REGARDING POSSIBLE ETIOLOGY - PSYCHIATRY WAS CONSULTED TO DISCUSS MOOD, PATIENT DOES EXPRESS THAT HE HAS SOME DEPRESSION GIVEN HIS MEDICAL CONDITIONS. BUT HE DENIES THAT THIS IS THE ETIOLOGY FOR NONCOMPLIANCE. HE EXPLAINS THAT HE DOES NOT LIKE BEING CONFINED TO A DIALYSIS MACHINE. OFFERED FOR PATIENT TO CONSIDER NURSING HOME W/ ONSITE HD. PATIENT WISHES TO CONTINUE TO LIVE IN ASSISTED LIVING.    # ACUTE ON CHRONIC HYPOXIC RESPIRATORY FAILURE S/T PULMONARY EDEMA - S/T INCOMPLETE HD SESSIONS ; ANASARCA  # HYPERKALEMIA  # HX OF COPD + S/P RECENT MULTIFOCAL PNEUMONIA ; R/O ASPIRATION PNEUMONIA  # UNCONTROLLED HTN  # ESRD ON HD TTS - W/ HX OF NONCOMPLIANCE    - TELEMETRY  - HYDRALAZINE, METOPROLOL AND NORVASC  - LOKELMA  - SUPPLEMENTAL O2  - PLANNED FOR HD  - NEPHROLOGY CONSULT    - PLANNED FOR HD 7/26, 7/27 [INCOMPLETE SESSIONS]  - REFUSED HD ON 7/28  - UNDERWENT HD 7/29 [COMPLETE SESSION]    - [7/30] - OVERNIGHT RAPID RESPONSE S/T HYPOXIA - SELF-LIMITED - PATIENT IMPROVED S/P O2 SUPPLEMENT  - EMPIRICALLY STARTED ON CEFEPIME + FLAGYLL, F/U BCX       # RECURRENT INTRACTABLE NAUSEA/VOMITING, ? COFFEE GROUND EMESIS  # GASTROPARESIS  # HISTORY OF ESOPHAGITIS AND DUODENITIS [1/2021], HX OF GASTROPARESIS W/ EVIDENCE OF THICKENED ESOPHAGUS ON PREVIOUS CT SCAN   # PANCREAS W/ DOUBLE DUCT SIGN    - MONITORING HGB, PLACED ON PPI BID , CARAFATE QID  - PRN ANTIEMETICS  - MONITORING FOR SYMPTOMS  - WHILE ON DIET PATIENT REPEATEDLY COUNSELLED TO CHEW FOOD CAUTIOUSLY AND CONSUME SMALL BITES W/ REGULAR CLEARING WITH WATER BETWEEN BITES    - NPO  - NOTED CT A/P    - S/P RECENT PRBC TRANSFUSION     - GI CONSULT  - SURGERY CONSULT    # R/O CHOLECYSTITIS  - PLACED ON CEFEPIME, F/U BCX  - NOTED CT A/P  - HIDA SCAN - NEGATIVE  - SURGERY CONSULT    # ELEVATED TROPONINS - ? DEMAND ISCHEMIA    - MONITOR ON TELEMETRY  - TREND TROPONINS  - CARDIOLOGY CONSULT    # GASTROPARESIS  # UNDERLYING DM  - SSI + FS  - COUNSELLED TO COMPLY WITH HD TO REDUCE UREMIA    # DEPRESSION  - PLACED ON ZOLOFT  - PSYCHIATRY CONSULT    # PATIENT UNDERGOING OUTPATIENT WORKUP FOR CENTRAL VENOUS STENOSIS THROUGH NEPHROLOGY TEAM  - ? s/p STENT PLACEMENT    # ANEMIA OF CKD  # HX OF PANCYTOPENIA   - TREND HGB, TRANSFUSION THRESHOLD HGB < 7; PATIENT STILL INTERMITTENTLY REFUSING BLOODWORK, COUNSELLED TO COMPLY  - TYPE AND SCREEN  - ON EPO  - DENIES RECENT HEMATEMESIS, MELENA, HEMATOCHEZIA    - S/P RECENT PRBC TRANSFUSION  - S/P PRBC TRANSFUSION 7/29    - PPI BID, CARAFATE QID    # SEVERE PROTEIN CALORIE MALNUTRITION, FAILURE TO THRIVE   -  TEMPORAL WASTING, LOSS OF MUSCLE MASS FROM SHOULDER AND HIP GIRDLE  - NUTRITIONAL SUPPLEMENT    # HLD  # PARTIALLY BLIND  # S/P LEFT BKA  # HX OF PVD  # LS SPINAL STENOSIS  # GI AND DVT PPX

## 2023-07-31 NOTE — PROGRESS NOTE ADULT - SUBJECTIVE AND OBJECTIVE BOX
[facility] : Progress Note :: [phone], (office [phone]),   Dr Mosher / Dr Washington / Dr Young / Dr Doll / Dr Varsha TURCIOS / Dr Tello / Dr Garcia / Dr Larry qiu  _____________________________________________________________________________________________  refused HD today    No Known Drug Allergies  fish (Rash)  liver (Anaphylaxis)    Hospital Medications:   MEDICATIONS  (STANDING):  albuterol/ipratropium for Nebulization 3 milliLiter(s) Nebulizer every 6 hours  amLODIPine   Tablet 10 milliGRAM(s) Oral daily  aspirin enteric coated 81 milliGRAM(s) Oral daily  atorvastatin 40 milliGRAM(s) Oral at bedtime  budesonide 160 MICROgram(s)/formoterol 4.5 MICROgram(s) Inhaler 2 Puff(s) Inhalation two times a day  cefepime   IVPB 1000 milliGRAM(s) IV Intermittent every 24 hours  dextrose 5%. 1000 milliLiter(s) (50 mL/Hr) IV Continuous <Continuous>  dextrose 5%. 1000 milliLiter(s) (100 mL/Hr) IV Continuous <Continuous>  dextrose 50% Injectable 25 Gram(s) IV Push once  dextrose 50% Injectable 12.5 Gram(s) IV Push once  dextrose 50% Injectable 25 Gram(s) IV Push once  epoetin beverley (PROCRIT) Injectable 61652 Unit(s) IV Push <User Schedule>  folic acid 1 milliGRAM(s) Oral daily  furosemide    Tablet 80 milliGRAM(s) Oral daily  glucagon  Injectable 1 milliGRAM(s) IntraMuscular once  hydrALAZINE 25 milliGRAM(s) Oral three times a day  insulin glargine Injectable (LANTUS) 4 Unit(s) SubCutaneous at bedtime  latanoprost 0.005% Ophthalmic Solution 1 Drop(s) Both EYES at bedtime  levothyroxine 25 MICROGram(s) Oral daily  LORazepam     Tablet 0.5 milliGRAM(s) Oral <User Schedule>  metoprolol succinate ER 50 milliGRAM(s) Oral daily  metroNIDAZOLE  IVPB 500 milliGRAM(s) IV Intermittent every 8 hours  pantoprazole  Injectable 40 milliGRAM(s) IV Push every 12 hours  sertraline 50 milliGRAM(s) Oral daily  sucralfate 1 Gram(s) Oral four times a day        VITALS:  T(F): 97.7 (07-31-23 @ 11:25), Max: 99.5 (07-31-23 @ 07:56)  HR: 104 (07-31-23 @ 11:25)  BP: 171/77 (07-31-23 @ 11:25)  RR: 18 (07-31-23 @ 11:25)  SpO2: 93% (07-31-23 @ 11:25)  Wt(kg): --      PHYSICAL EXAM:  Constitutional: facial edema   HEENT: anicteric sclera, oropharynx clear  Neck: No JVD  Respiratory: CTAB, no wheezes, rales or rhonchi  Cardiovascular: S1, S2, RRR  Gastrointestinal: BS+, soft, NT/ND  Extremities: No peripheral edema  Vascular Access: AVF     LABS:  07-31    139  |  100  |  88<H>  ----------------------------<  88  4.3   |  24  |  13.80<H>    Ca    8.3<L>      31 Jul 2023 09:00  Phos  6.1     07-31  Mg     3.0     07-31    TPro  6.6  /  Alb  3.1<L>  /  TBili  0.7  /  DBili      /  AST  26  /  ALT  41  /  AlkPhos  97  07-30    Creatinine Trend: 13.80 <--, 11.30 <--, 16.60 <--, 15.60 <--, 15.80 <--                        7.8    5.08  )-----------( 95       ( 31 Jul 2023 09:00 )             25.3     Urine Studies:  Urinalysis Basic - ( 31 Jul 2023 09:00 )    Color:  / Appearance:  / SG:  / pH:   Gluc: 88 mg/dL / Ketone:   / Bili:  / Urobili:    Blood:  / Protein:  / Nitrite:    Leuk Esterase:  / RBC:  / WBC    Sq Epi:  / Non Sq Epi:  / Bacteria:         RADIOLOGY & ADDITIONAL STUDIES:

## 2023-07-31 NOTE — PROGRESS NOTE ADULT - SUBJECTIVE AND OBJECTIVE BOX
Patient is a 62y old  Male who presents with a chief complaint of Missed dialysis (31 Jul 2023 13:20)    OVERNIGHT EVENTS: No acute changes.     Pt still appears lethargic, remains comfortable on 2L Oxygen nasal cannula. Currently NPO.     REVIEW OF SYSTEMS:  CONSTITUTIONAL: No fever, chills  ENMT:  No difficulty hearing, no change in vision  NECK: No pain or stiffness  RESPIRATORY: No cough, SOB  CARDIOVASCULAR: No chest pain, palpitations  GASTROINTESTINAL: +nausea. No abdominal pain.   GENITOURINARY: No dysuria  NEUROLOGICAL: No HA  SKIN: No itching, burning, rashes, or lesions   LYMPH NODES: No enlarged glands  ENDOCRINE: No heat or cold intolerance; No hair loss  MUSCULOSKELETAL: No joint pain or swelling; No muscle, back, or extremity pain  PSYCHIATRIC: No depression, anxiety  HEME/LYMPH: No easy bruising, or bleeding gums    T(C): 36.5 (07-31-23 @ 11:25), Max: 37.5 (07-31-23 @ 07:56)  HR: 104 (07-31-23 @ 11:25) (92 - 104)  BP: 171/77 (07-31-23 @ 11:25) (131/61 - 171/77)  RR: 18 (07-31-23 @ 11:25) (18 - 18)  SpO2: 93% (07-31-23 @ 11:25) (93% - 100%)  Wt(kg): --Vital Signs Last 24 Hrs  T(C): 36.5 (31 Jul 2023 11:25), Max: 37.5 (31 Jul 2023 07:56)  T(F): 97.7 (31 Jul 2023 11:25), Max: 99.5 (31 Jul 2023 07:56)  HR: 104 (31 Jul 2023 11:25) (92 - 104)  BP: 171/77 (31 Jul 2023 11:25) (131/61 - 171/77)  BP(mean): --  RR: 18 (31 Jul 2023 11:25) (18 - 18)  SpO2: 93% (31 Jul 2023 11:25) (93% - 100%)    Parameters below as of 31 Jul 2023 11:25  Patient On (Oxygen Delivery Method): nasal cannula  O2 Flow (L/min): 4      MEDICATIONS  (STANDING):  albuterol/ipratropium for Nebulization 3 milliLiter(s) Nebulizer every 6 hours  amLODIPine   Tablet 10 milliGRAM(s) Oral daily  aspirin enteric coated 81 milliGRAM(s) Oral daily  atorvastatin 40 milliGRAM(s) Oral at bedtime  budesonide 160 MICROgram(s)/formoterol 4.5 MICROgram(s) Inhaler 2 Puff(s) Inhalation two times a day  cefepime   IVPB 1000 milliGRAM(s) IV Intermittent every 24 hours  dextrose 5%. 1000 milliLiter(s) (50 mL/Hr) IV Continuous <Continuous>  dextrose 5%. 1000 milliLiter(s) (100 mL/Hr) IV Continuous <Continuous>  dextrose 50% Injectable 25 Gram(s) IV Push once  dextrose 50% Injectable 25 Gram(s) IV Push once  dextrose 50% Injectable 12.5 Gram(s) IV Push once  epoetin beverley (PROCRIT) Injectable 70834 Unit(s) IV Push <User Schedule>  folic acid 1 milliGRAM(s) Oral daily  furosemide    Tablet 80 milliGRAM(s) Oral daily  glucagon  Injectable 1 milliGRAM(s) IntraMuscular once  hydrALAZINE 25 milliGRAM(s) Oral three times a day  insulin glargine Injectable (LANTUS) 4 Unit(s) SubCutaneous at bedtime  latanoprost 0.005% Ophthalmic Solution 1 Drop(s) Both EYES at bedtime  levothyroxine 25 MICROGram(s) Oral daily  LORazepam     Tablet 0.5 milliGRAM(s) Oral <User Schedule>  metoprolol succinate ER 50 milliGRAM(s) Oral daily  metroNIDAZOLE  IVPB 500 milliGRAM(s) IV Intermittent every 8 hours  pantoprazole  Injectable 40 milliGRAM(s) IV Push every 12 hours  sertraline 50 milliGRAM(s) Oral daily  sucralfate 1 Gram(s) Oral four times a day    MEDICATIONS  (PRN):  dextrose Oral Gel 15 Gram(s) Oral once PRN Blood Glucose LESS THAN 70 milliGRAM(s)/deciliter  haloperidol    Injectable 0.5 milliGRAM(s) IntraMuscular every 8 hours PRN Agitation  ondansetron Injectable 4 milliGRAM(s) IV Push every 6 hours PRN Nausea and/or Vomiting      PHYSICAL EXAM:  GENERAL: NAD  EYES: clear conjunctiva  ENMT: dry mucous membranes  NECK: Supple, No JVD, Normal thyroid  CHEST/LUNG: Clear to auscultation bilaterally; No rales, rhonchi, wheezing, or rubs  HEART: S1, S2, Regular rate and rhythm  ABDOMEN: Soft, Nontender, Nondistended; Bowel sounds present  NEURO: Alert & Oriented X3  EXTREMITIES: +Left BKA. +right av fisulta with bruit/thrill. No LE edema, no calf tenderness  LYMPH: No lymphadenopathy noted  SKIN: No rashes or lesions    Consultant(s) Notes Reviewed:  [x ] YES  [ ] NO  Care Discussed with Consultants/Other Providers [ x] YES  [ ] NO    LABS:                        7.8    5.08  )-----------( 95       ( 31 Jul 2023 09:00 )             25.3     07-31    139  |  100  |  88<H>  ----------------------------<  88  4.3   |  24  |  13.80<H>    Ca    8.3<L>      31 Jul 2023 09:00  Phos  6.1     07-31  Mg     3.0     07-31    TPro  6.6  /  Alb  3.1<L>  /  TBili  0.7  /  DBili  x   /  AST  26  /  ALT  41  /  AlkPhos  97  07-30      CAPILLARY BLOOD GLUCOSE      POCT Blood Glucose.: 97 mg/dL (31 Jul 2023 12:05)  POCT Blood Glucose.: 90 mg/dL (31 Jul 2023 07:43)  POCT Blood Glucose.: 76 mg/dL (30 Jul 2023 21:28)        Urinalysis Basic - ( 31 Jul 2023 09:00 )    Color: x / Appearance: x / SG: x / pH: x  Gluc: 88 mg/dL / Ketone: x  / Bili: x / Urobili: x   Blood: x / Protein: x / Nitrite: x   Leuk Esterase: x / RBC: x / WBC x   Sq Epi: x / Non Sq Epi: x / Bacteria: x        RADIOLOGY & ADDITIONAL TESTS:    Imaging Personally Reviewed:  [ ] YES  [ ] NO   Patient is a 62y old  Male who presents with a chief complaint of Missed dialysis (31 Jul 2023 13:20)    OVERNIGHT EVENTS: No acute changes.     Pt still appears lethargic, remains comfortable on 2L Oxygen nasal cannula. Currently NPO.     REVIEW OF SYSTEMS:  CONSTITUTIONAL: No fever, chills  ENMT:  No difficulty hearing, no change in vision  NECK: No pain or stiffness  RESPIRATORY: No cough, SOB  CARDIOVASCULAR: No chest pain, palpitations  GASTROINTESTINAL: +nausea. No abdominal pain.   GENITOURINARY: No dysuria  NEUROLOGICAL: No HA  SKIN: No itching, burning, rashes, or lesions   LYMPH NODES: No enlarged glands  ENDOCRINE: No heat or cold intolerance; No hair loss  MUSCULOSKELETAL: No joint pain or swelling; No muscle, back, or extremity pain  PSYCHIATRIC: No depression, anxiety  HEME/LYMPH: No easy bruising, or bleeding gums    T(C): 36.5 (07-31-23 @ 11:25), Max: 37.5 (07-31-23 @ 07:56)  HR: 104 (07-31-23 @ 11:25) (92 - 104)  BP: 171/77 (07-31-23 @ 11:25) (131/61 - 171/77)  RR: 18 (07-31-23 @ 11:25) (18 - 18)  SpO2: 93% (07-31-23 @ 11:25) (93% - 100%)  Wt(kg): --Vital Signs Last 24 Hrs  T(C): 36.5 (31 Jul 2023 11:25), Max: 37.5 (31 Jul 2023 07:56)  T(F): 97.7 (31 Jul 2023 11:25), Max: 99.5 (31 Jul 2023 07:56)  HR: 104 (31 Jul 2023 11:25) (92 - 104)  BP: 171/77 (31 Jul 2023 11:25) (131/61 - 171/77)  BP(mean): --  RR: 18 (31 Jul 2023 11:25) (18 - 18)  SpO2: 93% (31 Jul 2023 11:25) (93% - 100%)    Parameters below as of 31 Jul 2023 11:25  Patient On (Oxygen Delivery Method): nasal cannula  O2 Flow (L/min): 4      MEDICATIONS  (STANDING):  albuterol/ipratropium for Nebulization 3 milliLiter(s) Nebulizer every 6 hours  amLODIPine   Tablet 10 milliGRAM(s) Oral daily  aspirin enteric coated 81 milliGRAM(s) Oral daily  atorvastatin 40 milliGRAM(s) Oral at bedtime  budesonide 160 MICROgram(s)/formoterol 4.5 MICROgram(s) Inhaler 2 Puff(s) Inhalation two times a day  cefepime   IVPB 1000 milliGRAM(s) IV Intermittent every 24 hours  dextrose 5%. 1000 milliLiter(s) (50 mL/Hr) IV Continuous <Continuous>  dextrose 5%. 1000 milliLiter(s) (100 mL/Hr) IV Continuous <Continuous>  dextrose 50% Injectable 25 Gram(s) IV Push once  dextrose 50% Injectable 25 Gram(s) IV Push once  dextrose 50% Injectable 12.5 Gram(s) IV Push once  epoetin beverley (PROCRIT) Injectable 18921 Unit(s) IV Push <User Schedule>  folic acid 1 milliGRAM(s) Oral daily  furosemide    Tablet 80 milliGRAM(s) Oral daily  glucagon  Injectable 1 milliGRAM(s) IntraMuscular once  hydrALAZINE 25 milliGRAM(s) Oral three times a day  insulin glargine Injectable (LANTUS) 4 Unit(s) SubCutaneous at bedtime  latanoprost 0.005% Ophthalmic Solution 1 Drop(s) Both EYES at bedtime  levothyroxine 25 MICROGram(s) Oral daily  LORazepam     Tablet 0.5 milliGRAM(s) Oral <User Schedule>  metoprolol succinate ER 50 milliGRAM(s) Oral daily  metroNIDAZOLE  IVPB 500 milliGRAM(s) IV Intermittent every 8 hours  pantoprazole  Injectable 40 milliGRAM(s) IV Push every 12 hours  sertraline 50 milliGRAM(s) Oral daily  sucralfate 1 Gram(s) Oral four times a day    MEDICATIONS  (PRN):  dextrose Oral Gel 15 Gram(s) Oral once PRN Blood Glucose LESS THAN 70 milliGRAM(s)/deciliter  haloperidol    Injectable 0.5 milliGRAM(s) IntraMuscular every 8 hours PRN Agitation  ondansetron Injectable 4 milliGRAM(s) IV Push every 6 hours PRN Nausea and/or Vomiting      PHYSICAL EXAM:  GENERAL: NAD  EYES: clear conjunctiva  ENMT: dry mucous membranes  NECK: Supple, No JVD, Normal thyroid  CHEST/LUNG: Clear to auscultation bilaterally; No rales, rhonchi, wheezing, or rubs  HEART: S1, S2, Regular rate and rhythm  ABDOMEN: Soft, Nontender, Nondistended; Bowel sounds present  NEURO: Alert & Oriented X3  EXTREMITIES: +Left BKA. +right av fistula with bruit/thrill. No LE edema, no calf tenderness  LYMPH: No lymphadenopathy noted  SKIN: No rashes or lesions    Consultant(s) Notes Reviewed:  [x ] YES  [ ] NO  Care Discussed with Consultants/Other Providers [ x] YES  [ ] NO    LABS:                        7.8    5.08  )-----------( 95       ( 31 Jul 2023 09:00 )             25.3     07-31    139  |  100  |  88<H>  ----------------------------<  88  4.3   |  24  |  13.80<H>    Ca    8.3<L>      31 Jul 2023 09:00  Phos  6.1     07-31  Mg     3.0     07-31    TPro  6.6  /  Alb  3.1<L>  /  TBili  0.7  /  DBili  x   /  AST  26  /  ALT  41  /  AlkPhos  97  07-30      CAPILLARY BLOOD GLUCOSE      POCT Blood Glucose.: 97 mg/dL (31 Jul 2023 12:05)  POCT Blood Glucose.: 90 mg/dL (31 Jul 2023 07:43)  POCT Blood Glucose.: 76 mg/dL (30 Jul 2023 21:28)        Urinalysis Basic - ( 31 Jul 2023 09:00 )    Color: x / Appearance: x / SG: x / pH: x  Gluc: 88 mg/dL / Ketone: x  / Bili: x / Urobili: x   Blood: x / Protein: x / Nitrite: x   Leuk Esterase: x / RBC: x / WBC x   Sq Epi: x / Non Sq Epi: x / Bacteria: x        RADIOLOGY & ADDITIONAL TESTS:  < from: Xray Chest 1 View-PORTABLE IMMEDIATE (Xray Chest 1 View-PORTABLE IMMEDIATE .) (07.30.23 @ 09:28) >    IMPRESSION: Increasing CHF. Gross heart enlargement again noted.    < end of copied text >  < from: CT Abdomen and Pelvis No Cont (07.30.23 @ 13:35) >    ACC: [id number] EXAM:  CT ABDOMEN AND PELVIS   ORDERED BY: ALEXANDER MATIAS     PROCEDURE DATE:  07/30/2023          INTERPRETATION:  CLINICAL INFORMATION: Worsening nausea and vomiting with   dark colored emesis.    COMPARISON: 7/26/2023    CONTRAST/COMPLICATIONS:  IV Contrast: NONE  Oral Contrast: NONE  Complications: None reported at time of study completion    PROCEDURE:  CT of the Abdomen and Pelvis was performed.  Sagittal and coronal reformats were performed.    FINDINGS: The examination is limited by lack of IV contrast.  LOWER CHEST: Small bilateral pleural effusions. Small bilateral   atelectasis. Nonspecific groundglass densities in the right lower lung   zone; clinical correlation with pneumonia is suggested. Cardiomegaly and   mild pericardial effusion, unchanged.    LIVER: Within normal limits.  BILE DUCTS: Dilated common bile duct again noted.  GALLBLADDER: Small gallstones. Nonspecific trace pericholecystic fluid.  SPLEEN: Within normal limits.  PANCREAS: Dilated main pancreaticduct is again noted.  ADRENALS: The right adrenal appears unremarkable. Nonspecific left   adrenal thickening.  KIDNEYS/URETERS: Within normal limits except for a few small hypodense   lesions in the kidneys bilaterally, representing probable cysts.    BLADDER: Underdistended.  REPRODUCTIVE ORGANS: The prostate and seminal vesicles appear grossly   unremarkable.    BOWEL: No bowel obstruction. Appendix unremarkable. Nonspecific mild   distal esophageal mural thickening.  PERITONEUM: Small ascites. No free air.  VESSELS: Calcified atherosclerotic disease.  RETROPERITONEUM/LYMPH NODES: No lymphadenopathy.  ABDOMINAL WALL: Anasarca again noted.  BONES: No acute CT findings.    IMPRESSION: Small gallstones. Nonspecific trace pericholecystic fluid..   If there is a clinical suspicion for acute cholecystitis, gallbladder   ultrasound/HIDA scan may be pursued for further evaluation.    Stable dilated common bile duct and main pancreatic duct. Please see   previous MRCP dated 2/23/2023.    No bowel obstruction. Appendix unremarkable. Nonspecific mild distal   esophageal mural thickening.    Small ascites.    Anasarca.    Small bilateral pleural effusions. Small bilateral atelectasis.   Nonspecific groundglass densities in the right lower lungzone; clinical   correlation with pneumonia is suggested.    Cardiomegaly and mild pericardial effusion, unchanged.    --- End of Report ---            ROSALBA GREEN MD; Attending Radiologist  This document has been electronically signed. Jul 30 2023  2:23PM    < end of copied text >  < from: NM Hepatobiliary Imaging w/ RX (07.31.23 @ 10:44) >  IMPRESSION: Patient declined to complete the full exam and bowel was not   visualized on the available images. Otherwise unremarkable exam with no   evidence of acute cholecystitis or biliary obstruction.    < end of copied text >      Imaging Personally Reviewed:  [ ] YES  [ ] NO   Patient is a 62y old  Male who presents with a chief complaint of Missed dialysis (31 Jul 2023 13:20)    OVERNIGHT EVENTS: No acute changes.     Pt still appears lethargic, remains comfortable on 4L Oxygen nasal cannula. Currently NPO.     REVIEW OF SYSTEMS:  CONSTITUTIONAL: No fever, chills  ENMT:  No difficulty hearing, no change in vision  NECK: No pain or stiffness  RESPIRATORY: No cough, SOB  CARDIOVASCULAR: No chest pain, palpitations  GASTROINTESTINAL: +nausea. No abdominal pain.   GENITOURINARY: No dysuria  NEUROLOGICAL: No HA  SKIN: No itching, burning, rashes, or lesions   LYMPH NODES: No enlarged glands  ENDOCRINE: No heat or cold intolerance; No hair loss  MUSCULOSKELETAL: No joint pain or swelling; No muscle, back, or extremity pain  PSYCHIATRIC: No depression, anxiety  HEME/LYMPH: No easy bruising, or bleeding gums    T(C): 36.5 (07-31-23 @ 11:25), Max: 37.5 (07-31-23 @ 07:56)  HR: 104 (07-31-23 @ 11:25) (92 - 104)  BP: 171/77 (07-31-23 @ 11:25) (131/61 - 171/77)  RR: 18 (07-31-23 @ 11:25) (18 - 18)  SpO2: 93% (07-31-23 @ 11:25) (93% - 100%)  Wt(kg): --Vital Signs Last 24 Hrs  T(C): 36.5 (31 Jul 2023 11:25), Max: 37.5 (31 Jul 2023 07:56)  T(F): 97.7 (31 Jul 2023 11:25), Max: 99.5 (31 Jul 2023 07:56)  HR: 104 (31 Jul 2023 11:25) (92 - 104)  BP: 171/77 (31 Jul 2023 11:25) (131/61 - 171/77)  BP(mean): --  RR: 18 (31 Jul 2023 11:25) (18 - 18)  SpO2: 93% (31 Jul 2023 11:25) (93% - 100%)    Parameters below as of 31 Jul 2023 11:25  Patient On (Oxygen Delivery Method): nasal cannula  O2 Flow (L/min): 4      MEDICATIONS  (STANDING):  albuterol/ipratropium for Nebulization 3 milliLiter(s) Nebulizer every 6 hours  amLODIPine   Tablet 10 milliGRAM(s) Oral daily  aspirin enteric coated 81 milliGRAM(s) Oral daily  atorvastatin 40 milliGRAM(s) Oral at bedtime  budesonide 160 MICROgram(s)/formoterol 4.5 MICROgram(s) Inhaler 2 Puff(s) Inhalation two times a day  cefepime   IVPB 1000 milliGRAM(s) IV Intermittent every 24 hours  dextrose 5%. 1000 milliLiter(s) (50 mL/Hr) IV Continuous <Continuous>  dextrose 5%. 1000 milliLiter(s) (100 mL/Hr) IV Continuous <Continuous>  dextrose 50% Injectable 25 Gram(s) IV Push once  dextrose 50% Injectable 25 Gram(s) IV Push once  dextrose 50% Injectable 12.5 Gram(s) IV Push once  epoetin beverley (PROCRIT) Injectable 25986 Unit(s) IV Push <User Schedule>  folic acid 1 milliGRAM(s) Oral daily  furosemide    Tablet 80 milliGRAM(s) Oral daily  glucagon  Injectable 1 milliGRAM(s) IntraMuscular once  hydrALAZINE 25 milliGRAM(s) Oral three times a day  insulin glargine Injectable (LANTUS) 4 Unit(s) SubCutaneous at bedtime  latanoprost 0.005% Ophthalmic Solution 1 Drop(s) Both EYES at bedtime  levothyroxine 25 MICROGram(s) Oral daily  LORazepam     Tablet 0.5 milliGRAM(s) Oral <User Schedule>  metoprolol succinate ER 50 milliGRAM(s) Oral daily  metroNIDAZOLE  IVPB 500 milliGRAM(s) IV Intermittent every 8 hours  pantoprazole  Injectable 40 milliGRAM(s) IV Push every 12 hours  sertraline 50 milliGRAM(s) Oral daily  sucralfate 1 Gram(s) Oral four times a day    MEDICATIONS  (PRN):  dextrose Oral Gel 15 Gram(s) Oral once PRN Blood Glucose LESS THAN 70 milliGRAM(s)/deciliter  haloperidol    Injectable 0.5 milliGRAM(s) IntraMuscular every 8 hours PRN Agitation  ondansetron Injectable 4 milliGRAM(s) IV Push every 6 hours PRN Nausea and/or Vomiting      PHYSICAL EXAM:  GENERAL: NAD  EYES: clear conjunctiva  ENMT: dry mucous membranes  NECK: Supple, No JVD, Normal thyroid  CHEST/LUNG: Clear to auscultation bilaterally; No rales, rhonchi, wheezing, or rubs  HEART: S1, S2, Regular rate and rhythm  ABDOMEN: Soft, Nontender, Nondistended; Bowel sounds present  NEURO: Alert & Oriented X3  EXTREMITIES: +Left BKA. +right av fistula with bruit/thrill. No LE edema, no calf tenderness  LYMPH: No lymphadenopathy noted  SKIN: No rashes or lesions    Consultant(s) Notes Reviewed:  [x ] YES  [ ] NO  Care Discussed with Consultants/Other Providers [ x] YES  [ ] NO    LABS:                        7.8    5.08  )-----------( 95       ( 31 Jul 2023 09:00 )             25.3     07-31    139  |  100  |  88<H>  ----------------------------<  88  4.3   |  24  |  13.80<H>    Ca    8.3<L>      31 Jul 2023 09:00  Phos  6.1     07-31  Mg     3.0     07-31    TPro  6.6  /  Alb  3.1<L>  /  TBili  0.7  /  DBili  x   /  AST  26  /  ALT  41  /  AlkPhos  97  07-30      CAPILLARY BLOOD GLUCOSE      POCT Blood Glucose.: 97 mg/dL (31 Jul 2023 12:05)  POCT Blood Glucose.: 90 mg/dL (31 Jul 2023 07:43)  POCT Blood Glucose.: 76 mg/dL (30 Jul 2023 21:28)        Urinalysis Basic - ( 31 Jul 2023 09:00 )    Color: x / Appearance: x / SG: x / pH: x  Gluc: 88 mg/dL / Ketone: x  / Bili: x / Urobili: x   Blood: x / Protein: x / Nitrite: x   Leuk Esterase: x / RBC: x / WBC x   Sq Epi: x / Non Sq Epi: x / Bacteria: x        RADIOLOGY & ADDITIONAL TESTS:  < from: Xray Chest 1 View-PORTABLE IMMEDIATE (Xray Chest 1 View-PORTABLE IMMEDIATE .) (07.30.23 @ 09:28) >    IMPRESSION: Increasing CHF. Gross heart enlargement again noted.    < end of copied text >  < from: CT Abdomen and Pelvis No Cont (07.30.23 @ 13:35) >    ACC: [id number] EXAM:  CT ABDOMEN AND PELVIS   ORDERED BY: ALEXANDER MATIAS     PROCEDURE DATE:  07/30/2023          INTERPRETATION:  CLINICAL INFORMATION: Worsening nausea and vomiting with   dark colored emesis.    COMPARISON: 7/26/2023    CONTRAST/COMPLICATIONS:  IV Contrast: NONE  Oral Contrast: NONE  Complications: None reported at time of study completion    PROCEDURE:  CT of the Abdomen and Pelvis was performed.  Sagittal and coronal reformats were performed.    FINDINGS: The examination is limited by lack of IV contrast.  LOWER CHEST: Small bilateral pleural effusions. Small bilateral   atelectasis. Nonspecific groundglass densities in the right lower lung   zone; clinical correlation with pneumonia is suggested. Cardiomegaly and   mild pericardial effusion, unchanged.    LIVER: Within normal limits.  BILE DUCTS: Dilated common bile duct again noted.  GALLBLADDER: Small gallstones. Nonspecific trace pericholecystic fluid.  SPLEEN: Within normal limits.  PANCREAS: Dilated main pancreaticduct is again noted.  ADRENALS: The right adrenal appears unremarkable. Nonspecific left   adrenal thickening.  KIDNEYS/URETERS: Within normal limits except for a few small hypodense   lesions in the kidneys bilaterally, representing probable cysts.    BLADDER: Underdistended.  REPRODUCTIVE ORGANS: The prostate and seminal vesicles appear grossly   unremarkable.    BOWEL: No bowel obstruction. Appendix unremarkable. Nonspecific mild   distal esophageal mural thickening.  PERITONEUM: Small ascites. No free air.  VESSELS: Calcified atherosclerotic disease.  RETROPERITONEUM/LYMPH NODES: No lymphadenopathy.  ABDOMINAL WALL: Anasarca again noted.  BONES: No acute CT findings.    IMPRESSION: Small gallstones. Nonspecific trace pericholecystic fluid..   If there is a clinical suspicion for acute cholecystitis, gallbladder   ultrasound/HIDA scan may be pursued for further evaluation.    Stable dilated common bile duct and main pancreatic duct. Please see   previous MRCP dated 2/23/2023.    No bowel obstruction. Appendix unremarkable. Nonspecific mild distal   esophageal mural thickening.    Small ascites.    Anasarca.    Small bilateral pleural effusions. Small bilateral atelectasis.   Nonspecific groundglass densities in the right lower lungzone; clinical   correlation with pneumonia is suggested.    Cardiomegaly and mild pericardial effusion, unchanged.    --- End of Report ---            ROSALBA GREEN MD; Attending Radiologist  This document has been electronically signed. Jul 30 2023  2:23PM    < end of copied text >  < from: NM Hepatobiliary Imaging w/ RX (07.31.23 @ 10:44) >  IMPRESSION: Patient declined to complete the full exam and bowel was not   visualized on the available images. Otherwise unremarkable exam with no   evidence of acute cholecystitis or biliary obstruction.    < end of copied text >      Imaging Personally Reviewed:  [ ] YES  [ ] NO

## 2023-08-01 NOTE — PROGRESS NOTE ADULT - SUBJECTIVE AND OBJECTIVE BOX
DATE OF SERVICE: 08-01-23    Patient denies chest pain or shortness of breath still with nausea and vomiting.  refusing HD    Review of symptoms otherwise negative.    albuterol/ipratropium for Nebulization 3 milliLiter(s) Nebulizer every 6 hours  amLODIPine   Tablet 10 milliGRAM(s) Oral daily  aspirin enteric coated 81 milliGRAM(s) Oral daily  atorvastatin 40 milliGRAM(s) Oral at bedtime  budesonide 160 MICROgram(s)/formoterol 4.5 MICROgram(s) Inhaler 2 Puff(s) Inhalation two times a day  cefepime   IVPB 1000 milliGRAM(s) IV Intermittent every 24 hours  dextrose 5%. 1000 milliLiter(s) IV Continuous <Continuous>  dextrose 5%. 1000 milliLiter(s) IV Continuous <Continuous>  dextrose 50% Injectable 25 Gram(s) IV Push once  dextrose 50% Injectable 12.5 Gram(s) IV Push once  dextrose 50% Injectable 25 Gram(s) IV Push once  dextrose Oral Gel 15 Gram(s) Oral once PRN  epoetin beverley (PROCRIT) Injectable 67674 Unit(s) IV Push <User Schedule>  folic acid 1 milliGRAM(s) Oral daily  furosemide    Tablet 80 milliGRAM(s) Oral daily  glucagon  Injectable 1 milliGRAM(s) IntraMuscular once  haloperidol    Injectable 0.5 milliGRAM(s) IntraMuscular every 8 hours PRN  hydrALAZINE 25 milliGRAM(s) Oral three times a day  insulin glargine Injectable (LANTUS) 4 Unit(s) SubCutaneous at bedtime  latanoprost 0.005% Ophthalmic Solution 1 Drop(s) Both EYES at bedtime  levothyroxine 25 MICROGram(s) Oral daily  LORazepam     Tablet 0.5 milliGRAM(s) Oral <User Schedule>  metoprolol succinate ER 50 milliGRAM(s) Oral daily  metroNIDAZOLE  IVPB 500 milliGRAM(s) IV Intermittent every 8 hours  ondansetron Injectable 4 milliGRAM(s) IV Push every 6 hours PRN  pantoprazole  Injectable 40 milliGRAM(s) IV Push every 12 hours  sertraline 50 milliGRAM(s) Oral daily  sucralfate 1 Gram(s) Oral four times a day                            7.5    5.57  )-----------( 100      ( 31 Jul 2023 23:40 )             24.5       Hemoglobin: 7.5 g/dL (07-31 @ 23:40)  Hemoglobin: 7.8 g/dL (07-31 @ 09:00)  Hemoglobin: 8.0 g/dL (07-30 @ 03:07)  Hemoglobin: 6.5 g/dL (07-29 @ 11:12)  Hemoglobin: 7.0 g/dL (07-28 @ 10:35)      07-31    139  |  100  |  88<H>  ----------------------------<  88  4.3   |  24  |  13.80<H>    Ca    8.3<L>      31 Jul 2023 09:00  Phos  6.1     07-31  Mg     3.0     07-31      Creatinine Trend: 13.80<--, 11.30<--, 16.60<--, 15.60<--, 15.80<--, 17.20<--    COAGS:           T(C): 36.5 (08-01-23 @ 05:08), Max: 37.2 (07-31-23 @ 17:29)  HR: 96 (08-01-23 @ 07:30) (96 - 134)  BP: 173/85 (08-01-23 @ 07:30) (160/73 - 180/83)  RR: 20 (08-01-23 @ 05:08) (18 - 20)  SpO2: 93% (08-01-23 @ 05:08) (92% - 93%)  Wt(kg): --    I&O's Summary    31 Jul 2023 07:01  -  01 Aug 2023 07:00  --------------------------------------------------------  IN: 200 mL / OUT: 1 mL / NET: 199 mL        HEENT:  (-)icterus (-)pallor  CV: N S1 S2 1/6 DIANA (+)2 Pulses B/l  Resp:  Clear to ausculatation B/L, normal effort  GI: (+) BS Soft, NT, ND  Lymph:  (-)Edema, (-)obvious lymphadenopathy  Skin: Warm to touch, Normal turgor  Psych: Appropriate mood and affect         ASSESSMENT/PLAN: 	62y Male  Mateus Murray, walks with RW, with PMH of ESRD on HD (TTS), BKA- L w/prosthetic leg, DM, Left eye blindness, CHF,, HTN, HLD, EF 45-50%, normal perfusion 4/23 osteoporosis, bronchitis, current smoker, and anemia who presents with complaint of missed dialysis.     # CHF  - Due to missed HD  - HD per renal  -    # Positive trop  - nothing to suggest ACS.  His trop has been higher in the past and demonstrated normal perfusion on Stress 4/23  - ECHO pending     # Noncompliance  - Behavioral health f/u    # Smoking  - Cessation stressed    # HTN  - Suspect BP will improve once euvolemic     # N/V  - Surgery f/u    Dominic Still MD, [facility]  BEEPER [phone]

## 2023-08-01 NOTE — PROGRESS NOTE ADULT - PROBLEM SELECTOR PLAN 1
possible in setting of pulmonary edema vs aspiration pneumonia vs COPD  CT A/P noted for Right sided pna  repeat CXR showing worsening CHF  started on cefepime and flagyl   continue 4L oxygen via nasal cannula  c/w duonebs q6h for now  c/w symbicort 160/4.5 mcg 2 puffs bid  maintain oxygen sat. >92%  Nephrology following, next HD 8/1  Pulm. Dr. Loredo consulted, apprec recs  ID Dr. Newby consulted, apprec recs

## 2023-08-01 NOTE — PROGRESS NOTE ADULT - PROBLEM SELECTOR PLAN 9
Controlled in the community as evidenced by A1c 6.1  Takes 4U Lantus QHS at home  Continue home Lantus regimen  Finger stick QHS  Check glucose with AM labs

## 2023-08-01 NOTE — PROGRESS NOTE ADULT - PROBLEM SELECTOR PLAN 8
started on zoloft 50 mg qhs  PRN: Haldol 0.5 mg IM/IV q 8 hrs PRN for agitation  Monitor his QTc  Psych followed

## 2023-08-01 NOTE — PROGRESS NOTE ADULT - PROBLEM SELECTOR PLAN 6
pt p/w RUQ abdominal pain and vomiting  CT A/P noted for cholelithiasis  HIDA scan neg. for acute cholecystitis  continue pain control - tylenol PRN

## 2023-08-01 NOTE — PROGRESS NOTE ADULT - ASSESSMENT
62 year old male from [facility], walks with RW, with PMH of ESRD on HD (TTS), BKA- L w/prosthetic leg, DM, Left eye blindness, CHF, CAD, HTN, HLD, osteoporosis, bronchitis, current smoker, and anemia who presents with complaint of missed dialysis and does not make any urine. Patient states he was having shortness of breath. Patient admitted to telemetry for Acute Hyperkalemia 2/2 missed dialysis found to have pulmonary edema and BNP [id number]. Cardiology and nephro following.     Additionally pt had coffee ground emesis and abdominal pain, repeat CT A/P was negative for small bowel obstruction. But noted for cholelithiasis and trace pericholecystic fluid. Surgery following, concern for possible acute cholecystitis, HIDA scan was negative for acute cholecystitis. Additionally GI was consulted for possible endoscopy due to recent anemia and coffee ground emesis, no additional intervention at this time.     Hospital course complicated by Acute Hypoxic Respiratory Failure 2/2 worsening CHF. Patient was a rapid response due to hypoxia of 70-80% on room air, now requiring 4L oxygen via nasal cannula. Repeat CXR showing worsening pulmonary edema. Pt also found to have small bilateral pleural effusions, bilateral atelectasis and possible aspiration pneumonia, patient was started on cefepime and flagyl, ID Dr. Newby was consulted.    Patient for dialysis today 8/1, continues to have intermittent nausea/vomiting. brother, Georgi King updated and aware of plan of care.

## 2023-08-01 NOTE — PROGRESS NOTE ADULT - SUBJECTIVE AND OBJECTIVE BOX
[facility] : Progress Note :: [phone], (office [phone]),   Dr Mosher / Dr Washington / Dr Young / Dr Doll / Dr Varsha TURCIOS / Dr Tello / Dr Garcia / Dr Larry qiu  _____________________________________________________________________________________________    was refusing HD through the day, but agreed in the evening    No Known Drug Allergies  fish (Rash)  liver (Anaphylaxis)    Hospital Medications:   MEDICATIONS  (STANDING):  albuterol/ipratropium for Nebulization 3 milliLiter(s) Nebulizer every 6 hours  amLODIPine   Tablet 10 milliGRAM(s) Oral daily  aspirin enteric coated 81 milliGRAM(s) Oral daily  atorvastatin 40 milliGRAM(s) Oral at bedtime  budesonide 160 MICROgram(s)/formoterol 4.5 MICROgram(s) Inhaler 2 Puff(s) Inhalation two times a day  cefepime   IVPB 1000 milliGRAM(s) IV Intermittent every 24 hours  dextrose 5%. 1000 milliLiter(s) (50 mL/Hr) IV Continuous <Continuous>  dextrose 5%. 1000 milliLiter(s) (100 mL/Hr) IV Continuous <Continuous>  dextrose 50% Injectable 12.5 Gram(s) IV Push once  dextrose 50% Injectable 25 Gram(s) IV Push once  dextrose 50% Injectable 25 Gram(s) IV Push once  epoetin beverley (PROCRIT) Injectable 00859 Unit(s) IV Push <User Schedule>  folic acid 1 milliGRAM(s) Oral daily  furosemide    Tablet 80 milliGRAM(s) Oral daily  glucagon  Injectable 1 milliGRAM(s) IntraMuscular once  hydrALAZINE 25 milliGRAM(s) Oral three times a day  insulin glargine Injectable (LANTUS) 4 Unit(s) SubCutaneous at bedtime  latanoprost 0.005% Ophthalmic Solution 1 Drop(s) Both EYES at bedtime  levothyroxine 25 MICROGram(s) Oral daily  LORazepam     Tablet 0.5 milliGRAM(s) Oral <User Schedule>  metoprolol succinate ER 50 milliGRAM(s) Oral daily  metroNIDAZOLE  IVPB 500 milliGRAM(s) IV Intermittent every 8 hours  pantoprazole  Injectable 40 milliGRAM(s) IV Push every 12 hours  sertraline 50 milliGRAM(s) Oral daily  sucralfate 1 Gram(s) Oral four times a day        VITALS:  T(F): 98.7 (08-01-23 @ 17:03), Max: 98.7 (08-01-23 @ 17:03)  HR: 91 (08-01-23 @ 17:03)  BP: 159/76 (08-01-23 @ 17:03)  RR: 18 (08-01-23 @ 17:03)  SpO2: 96% (08-01-23 @ 17:03)  Wt(kg): --    07-31 @ 07:01  -  08-01 @ 07:00  --------------------------------------------------------  IN: 200 mL / OUT: 1 mL / NET: 199 mL        PHYSICAL EXAM:  Constitutional: facial swelling.  HEENT: anicteric sclera, oropharynx clear,  Neck: No JVD  Respiratory: CTAB, no wheezes, rales or rhonchi  Cardiovascular: S1, S2, RRR  Gastrointestinal: BS+, soft, NT/ND  Extremities: No peripheral edema  Neurological: A/O x 3, no focal deficits  Vascular Access: AVF with thrill and bruit     LABS:  08-01    139  |  101  |  101<H>  ----------------------------<  86  4.4   |  23  |  16.10<H>    Ca    8.3<L>      01 Aug 2023 17:05  Phos  7.1     08-01  Mg     3.1     08-01    TPro  6.6  /  Alb  2.9<L>  /  TBili  0.8  /  DBili      /  AST  45<H>  /  ALT  42  /  AlkPhos  73  08-01    Creatinine Trend: 16.10 <--, 13.80 <--, 11.30 <--, 16.60 <--, 15.60 <--, 15.80 <--                        7.3    5.34  )-----------( 116      ( 01 Aug 2023 17:05 )             23.4     Urine Studies:  Urinalysis Basic - ( 01 Aug 2023 17:05 )    Color:  / Appearance:  / SG:  / pH:   Gluc: 86 mg/dL / Ketone:   / Bili:  / Urobili:    Blood:  / Protein:  / Nitrite:    Leuk Esterase:  / RBC:  / WBC    Sq Epi:  / Non Sq Epi:  / Bacteria:         RADIOLOGY & ADDITIONAL STUDIES:

## 2023-08-01 NOTE — PROGRESS NOTE ADULT - PROBLEM SELECTOR PLAN 5
In the setting of ESRD vs possible gastric ulcer  s/p 1 unit prbc  hgb today 7.8  Continue Epogen with dialysis  Continue Iron tabs  Continue protonix 40 mg bid  Continue sucralfate 1g qid  transfuse if hgb <7  maintain type and screen every 3 days  GI following, no endoscopy at this time

## 2023-08-01 NOTE — PROGRESS NOTE ADULT - ASSESSMENT
# ESRD. he is grossly fluid overloaded.  continue TTS schedule   Encouraged to stay for the full treatment time   # anemia of CKD. epogen on HD. transfuse for HBG <7  # Renal osteodystrophy .sevelamer ordered.  # HTN. cont current medications . UF at HD

## 2023-08-01 NOTE — PROGRESS NOTE ADULT - PROBLEM SELECTOR PLAN 3
schedule T/Thurs/Sat  s/p tele as hyperkalemia is now resolved  for HD today 8/1   Renal diet  Continue ativan 0.5 mg tiw with dialysis  Dr. Mosher, Nephrology, following

## 2023-08-01 NOTE — CONSULT NOTE ADULT - SUBJECTIVE AND OBJECTIVE BOX
HPI:  Patient is 62 year old male from [facility], walks with RW, with PMH of ESRD on HD (TTS), BKA- L w/prosthetic leg, DM, Left eye blindness, CHF, CAD, HTN, HLD, osteoporosis, bronchitis, current smoker, and anemia who presents with complaint of missed dialysis. Last HD 7/22. Pt states he often missed HD sessions.  patient states he missed this HD session due to mild pain in left leg, patient remains able to ambulate. Pt complains of right leg swelling and states he does not make any urine. Patient states he was having mild shortness of breath.  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      Osteoarthritis      Vision loss of left eye  blind      ESRD on hemodialysis  T/Th/S      Cataract  both eyes - hx of sx done      BPH (benign prostatic hyperplasia)      UTI (urinary tract infection)  hx of      Bladder mass  hx of      H/O hematuria      Osteoporosis      Vision loss of right eye  decreased      Depression      Chronic GERD      Osteomyelitis of vertebra      CHF (congestive heart failure)      Below knee amputation status, left  2012- pt is wearing prostesis      History of right cataract extraction      History of left cataract extraction      S/P arteriovenous (AV) fistula creation  right arm brachiocephalic arteriovenous fistula on 11/08/2018      H/O hematuria  s/p bladder bx and fulguration 2/25/2020      H/O transurethral destruction of bladder lesion  2020      History of excision of mass  back mass on 03/31/2021          No Known Drug Allergies  fish (Rash)  liver (Anaphylaxis)      Meds:  albuterol/ipratropium for Nebulization 3 milliLiter(s) Nebulizer every 6 hours  amLODIPine   Tablet 10 milliGRAM(s) Oral daily  aspirin enteric coated 81 milliGRAM(s) Oral daily  atorvastatin 40 milliGRAM(s) Oral at bedtime  budesonide 160 MICROgram(s)/formoterol 4.5 MICROgram(s) Inhaler 2 Puff(s) Inhalation two times a day  cefepime   IVPB 1000 milliGRAM(s) IV Intermittent every 24 hours  dextrose 5%. 1000 milliLiter(s) IV Continuous <Continuous>  dextrose 5%. 1000 milliLiter(s) IV Continuous <Continuous>  dextrose 50% Injectable 25 Gram(s) IV Push once  dextrose 50% Injectable 25 Gram(s) IV Push once  dextrose 50% Injectable 12.5 Gram(s) IV Push once  dextrose Oral Gel 15 Gram(s) Oral once PRN  epoetin beverley (PROCRIT) Injectable 14789 Unit(s) IV Push <User Schedule>  folic acid 1 milliGRAM(s) Oral daily  furosemide    Tablet 80 milliGRAM(s) Oral daily  glucagon  Injectable 1 milliGRAM(s) IntraMuscular once  haloperidol    Injectable 0.5 milliGRAM(s) IntraMuscular every 8 hours PRN  hydrALAZINE 25 milliGRAM(s) Oral three times a day  insulin glargine Injectable (LANTUS) 4 Unit(s) SubCutaneous at bedtime  latanoprost 0.005% Ophthalmic Solution 1 Drop(s) Both EYES at bedtime  levothyroxine 25 MICROGram(s) Oral daily  LORazepam     Tablet 0.5 milliGRAM(s) Oral <User Schedule>  metoprolol succinate ER 50 milliGRAM(s) Oral daily  metroNIDAZOLE  IVPB 500 milliGRAM(s) IV Intermittent every 8 hours  ondansetron Injectable 4 milliGRAM(s) IV Push every 6 hours PRN  pantoprazole  Injectable 40 milliGRAM(s) IV Push every 12 hours  sertraline 50 milliGRAM(s) Oral daily  sucralfate 1 Gram(s) Oral four times a day      SOCIAL HISTORY:  Smoker:  YES / NO        PACK YEARS:                         WHEN QUIT?  ETOH use:  YES / NO               FREQUENCY / QUANTITY:  Ilicit Drug use:  YES / NO  Occupation:  Assisted device use (Cane / Walker):  Live with:    FAMILY HISTORY:  Family history of cirrhosis of liver (Mother)    Family history of renal failure (Sibling)    Family history of hypertension (Sibling)    Family history of diabetes mellitus (Sibling)    History of substance abuse in sibling (Sibling)        VITALS:  Vital Signs Last 24 Hrs  T(C): 36.5 (01 Aug 2023 05:08), Max: 37.2 (31 Jul 2023 17:29)  T(F): 97.7 (01 Aug 2023 05:08), Max: 98.9 (31 Jul 2023 17:29)  HR: 96 (01 Aug 2023 07:30) (96 - 134)  BP: 173/85 (01 Aug 2023 07:30) (160/73 - 180/83)  BP(mean): --  RR: 20 (01 Aug 2023 05:08) (18 - 20)  SpO2: 93% (01 Aug 2023 05:08) (92% - 93%)    Parameters below as of 01 Aug 2023 05:08  Patient On (Oxygen Delivery Method): nasal cannula  O2 Flow (L/min): 4      LABS/DIAGNOSTIC TESTS:                          7.5    5.57  )-----------( 100      ( 31 Jul 2023 23:40 )             24.5     WBC Count: 5.57 K/uL (07-31 @ 23:40)  WBC Count: 5.08 K/uL (07-31 @ 09:00)  WBC Count: 4.87 K/uL (07-30 @ 03:07)      07-31    139  |  100  |  88<H>  ----------------------------<  88  4.3   |  24  |  13.80<H>    Ca    8.3<L>      31 Jul 2023 09:00  Phos  6.1     07-31  Mg     3.0     07-31        Urinalysis Basic - ( 31 Jul 2023 09:00 )    Color: x / Appearance: x / SG: x / pH: x  Gluc: 88 mg/dL / Ketone: x  / Bili: x / Urobili: x   Blood: x / Protein: x / Nitrite: x   Leuk Esterase: x / RBC: x / WBC x   Sq Epi: x / Non Sq Epi: x / Bacteria: x                LACTATE:    ABG -     CULTURES:             RADIOLOGY:< from: NM Hepatobiliary Imaging w/ RX (07.31.23 @ 10:44) >  ACC: [id number] EXAM:  NM HEPATOBILIARY IMG W RX   ORDERED BY: SHAKIR MAC     PROCEDURE DATE:  07/31/2023          INTERPRETATION:  CLINICAL INFORMATION: 62-year-old man with RIGHT UPPER   quadrant pain referred for evaluation of acute cholecystitis.    DURATION of DYNAMIC SERIES: 45 minutes  RADIOPHARMACEUTICAL: 3.2 mCi Tc-99m-Mebrofenin, I.V.    TECHNIQUE: Dynamic imaging of the anterior abdomen was performed   following radiopharmaceutical injection. Patient declined static images.    COMPARISON: None    OTHER STUDIES USED FOR CORRELATION: CT abdomen/pelvis 7/30/2023    FINDINGS: There is prompt, homogeneous uptake of radiopharmaceutical by   the hepatocytes. Activity is seen in the gallbladder at approximately 15   minutes. Patient declined further imaging before bowel was visualized.   There is fair clearance of activity from the liver by the end of the   obtained images..    IMPRESSION: Patient declined to complete the full exam and bowel was not   visualized on the available images. Otherwise unremarkable exam with no   evidence of acute cholecystitis or biliary obstruction.        --- End of Report ---            JD TUCKER MD; Attending Radiologist  This document has been electronically signed. Jul 31 2023 10:59AM    < end of copied text >  -----------------------------------------------------------------------------------------------------------------------------------------------------  ACC: [id number] EXAM:  CT ABDOMEN AND PELVIS   ORDERED BY: ALEXANDER MATIAS     PROCEDURE DATE:  07/30/2023          INTERPRETATION:  CLINICAL INFORMATION: Worsening nausea and vomiting with   dark colored emesis.    COMPARISON: 7/26/2023    CONTRAST/COMPLICATIONS:  IV Contrast: NONE  Oral Contrast: NONE  Complications: None reported at time of study completion    PROCEDURE:  CT of the Abdomen and Pelvis was performed.  Sagittal and coronal reformats were performed.    FINDINGS: The examination is limited by lack of IV contrast.  LOWER CHEST: Small bilateral pleural effusions. Small bilateral   atelectasis. Nonspecific groundglass densities in the right lower lung   zone; clinical correlation with pneumonia is suggested. Cardiomegaly and   mild pericardial effusion, unchanged.    LIVER: Within normal limits.  BILE DUCTS: Dilated common bile duct again noted.  GALLBLADDER: Small gallstones. Nonspecific trace pericholecystic fluid.  SPLEEN: Within normal limits.  PANCREAS: Dilated main pancreaticduct is again noted.  ADRENALS: The right adrenal appears unremarkable. Nonspecific left   adrenal thickening.  KIDNEYS/URETERS: Within normal limits except for a few small hypodense   lesions in the kidneys bilaterally, representing probable cysts.    BLADDER: Underdistended.  REPRODUCTIVE ORGANS: The prostate and seminal vesicles appear grossly   unremarkable.    BOWEL: No bowel obstruction. Appendix unremarkable. Nonspecific mild   distal esophageal mural thickening.  PERITONEUM: Small ascites. No free air.  VESSELS: Calcified atherosclerotic disease.  RETROPERITONEUM/LYMPH NODES: No lymphadenopathy.  ABDOMINAL WALL: Anasarca again noted.  BONES: No acute CT findings.    IMPRESSION: Small gallstones. Nonspecific trace pericholecystic fluid..   If there is a clinical suspicion for acute cholecystitis, gallbladder   ultrasound/HIDA scan may be pursued for further evaluation.    Stable dilated common bile duct and main pancreatic duct. Please see   previous MRCP dated 2/23/2023.    No bowel obstruction. Appendix unremarkable. Nonspecific mild distal   esophageal mural thickening.    Small ascites.    Anasarca.    Small bilateral pleural effusions. Small bilateral atelectasis.   Nonspecific groundglass densities in the right lower lungzone; clinical   correlation with pneumonia is suggested.    Cardiomegaly and mild pericardial effusion, unchanged.    --- End of Report ---            ROSALBA GREEN MD; Attending Radiologist  This document has been electronically signed. Jul 30 2023  2:23PM    < end of copied text >  -----------------------------------------------------------------------------------------------------------------------------------------------------------------  ACC: [id number] EXAM:  XR CHEST PORTABLE IMMED 1V   ORDERED BY: JULIETA MUJICA     PROCEDURE DATE:  07/30/2023          INTERPRETATION:  AP semisupine chest on July 30, 2023 at 9:14 AM. 2   images. Patient is short of breath.    Gross heart enlargement again noted. There is increasing perihilar   infiltrate now mild to moderate compared to July 26.    IMPRESSION: Increasing CHF. Gross heart enlargement again noted.    --- End of Report ---            ARASH REYNOSO MD; Attending Radiologist  This document has been electronically signed. Jul 30 2023  4:03PM    < end of copied text >  --------------------------------------------------------------------------------------------------------------------------------------------------------  ACC: [id number] EXAM:  CT ABDOMEN AND PELVIS   ORDERED BY: JAD MATOS     ACC: [id number] EXAM:  CT CHEST   ORDERED BY: JAD MATOS     PROCEDURE DATE:  07/26/2023          INTERPRETATION:  CLINICAL INFORMATION: ESRD on hemodialysis. Fluid   overload.    COMPARISON: 2/21/2023. 12/26/2021.    CONTRAST/COMPLICATIONS:  IV Contrast: NONE  Oral Contrast: NONE  Complications: None reported at time of study completion    PROCEDURE:  CT of the Chest, Abdomen and Pelvis was performed.  Sagittal and coronal reformats were performed.    FINDINGS:  CHEST:  LUNGS AND LARGE AIRWAYS: Patent central airways. Mild interlobular septal   thickening and groundglass opacities. Atelectasis in the anterior left   lower lobe and in the lingula.  PLEURA: Small right andtrace left pleural effusions.  VESSELS: Atherosclerotic changes of the aorta and coronary arteries.   Stent, possibly in the right axillary vein.  HEART: Cardiomegaly. No pericardial effusion.  MEDIASTINUM AND PADMINI: No lymphadenopathy.  CHEST WALL AND LOWER NECK: Within normal limits.    ABDOMEN AND PELVIS:  LIVER: Within normal limits.  BILE DUCTS: Mild dilatation of the biliary tree is also unchanged.  GALLBLADDER: Within normal limits.  SPLEEN: Within normal limits.  PANCREAS: A dilated proximal pancreatic duct is again noted and unchanged   since recent MRI.  ADRENALS: Within normal limits.  KIDNEYS/URETERS: Bilateral atrophic kidneys.    BLADDER: Decompressed and could not be evaluated.  REPRODUCTIVE ORGANS: Prostate within normal limits.    BOWEL: No bowel obstruction. Appendix is normal. Retained contrast within   the large bowel which contains moderate amount of stool but is otherwise   is normal in appearance.  PERITONEUM: No ascites.  VESSELS: Atherosclerotic changes.  RETROPERITONEUM/LYMPH NODES: No lymphadenopathy.  ABDOMINAL WALL: Anasarca.  BONES: Degenerative changes.    IMPRESSION:  Mild pulmonary edema and marked anasarca.  A dilated proximal pancreatic duct and mildly dilated biliary tree are   without significant change.      --- End of Report ---            AXEL GOMES MD; Attending Radiologist  This document has been electronically signed. Jul 26 2023  6:26PM    < end of copied text >        ROS  [  ] UNABLE TO ELICIT               HPI:  Patient is 62 year old male from [facility], walks with RW, with PMH of ESRD on HD (TTS), BKA- L w/prosthetic leg, DM, Left eye blindness, CHF, CAD, HTN, HLD, osteoporosis, bronchitis, current smoker, and anemia who presents with complaint of missed dialysis. Last HD 7/22. Pt states he often missed HD sessions.  patient states he missed this HD session due to mild pain in left leg, patient remains able to ambulate. Pt complains of right leg swelling and states he does not make any urine. Patient states he was having mild shortness of breath.  Pt denies any fever, chills, N/V/D, constipation, CP, numbness or tingling (26 Jul 2023 19:20)         History as above , asked to see this patient who presented to the hospital after missing dialysis treatments and leg swelling , he is a difficult patient and refuses to cooperative and answer many questions , he is also not cooperative with the nurses and lab technicians , he refuses blood draws and refuses to take po meds at times. I was asked to eval to r/o possible aspiration pneumonia as he had been having spells of vomiting. He is currently lying in bed in no acute distress , he is denying having any coughing or SOB, he is not even on any oxygen at this time. He has no fever or leukocytosis. His CT chest showed him to have some right lower lung groundglass opacities and pleural effusions.         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      Osteoarthritis      Vision loss of left eye  blind      ESRD on hemodialysis  T/Th/S      Cataract  both eyes - hx of sx done      BPH (benign prostatic hyperplasia)      UTI (urinary tract infection)  hx of      Bladder mass  hx of      H/O hematuria      Osteoporosis      Vision loss of right eye  decreased      Depression      Chronic GERD      Osteomyelitis of vertebra      CHF (congestive heart failure)      Below knee amputation status, left  2012- pt is wearing prostesis      History of right cataract extraction      History of left cataract extraction      S/P arteriovenous (AV) fistula creation  right arm brachiocephalic arteriovenous fistula on 11/08/2018      H/O hematuria  s/p bladder bx and fulguration 2/25/2020      H/O transurethral destruction of bladder lesion  2020      History of excision of mass  back mass on 03/31/2021          No Known Drug Allergies  fish (Rash)  liver (Anaphylaxis)      Meds:  albuterol/ipratropium for Nebulization 3 milliLiter(s) Nebulizer every 6 hours  amLODIPine   Tablet 10 milliGRAM(s) Oral daily  aspirin enteric coated 81 milliGRAM(s) Oral daily  atorvastatin 40 milliGRAM(s) Oral at bedtime  budesonide 160 MICROgram(s)/formoterol 4.5 MICROgram(s) Inhaler 2 Puff(s) Inhalation two times a day  cefepime   IVPB 1000 milliGRAM(s) IV Intermittent every 24 hours  dextrose 5%. 1000 milliLiter(s) IV Continuous <Continuous>  dextrose 5%. 1000 milliLiter(s) IV Continuous <Continuous>  dextrose 50% Injectable 25 Gram(s) IV Push once  dextrose 50% Injectable 25 Gram(s) IV Push once  dextrose 50% Injectable 12.5 Gram(s) IV Push once  dextrose Oral Gel 15 Gram(s) Oral once PRN  epoetin beverley (PROCRIT) Injectable 68341 Unit(s) IV Push <User Schedule>  folic acid 1 milliGRAM(s) Oral daily  furosemide    Tablet 80 milliGRAM(s) Oral daily  glucagon  Injectable 1 milliGRAM(s) IntraMuscular once  haloperidol    Injectable 0.5 milliGRAM(s) IntraMuscular every 8 hours PRN  hydrALAZINE 25 milliGRAM(s) Oral three times a day  insulin glargine Injectable (LANTUS) 4 Unit(s) SubCutaneous at bedtime  latanoprost 0.005% Ophthalmic Solution 1 Drop(s) Both EYES at bedtime  levothyroxine 25 MICROGram(s) Oral daily  LORazepam     Tablet 0.5 milliGRAM(s) Oral <User Schedule>  metoprolol succinate ER 50 milliGRAM(s) Oral daily  metroNIDAZOLE  IVPB 500 milliGRAM(s) IV Intermittent every 8 hours  ondansetron Injectable 4 milliGRAM(s) IV Push every 6 hours PRN  pantoprazole  Injectable 40 milliGRAM(s) IV Push every 12 hours  sertraline 50 milliGRAM(s) Oral daily  sucralfate 1 Gram(s) Oral four times a day      SOCIAL HISTORY:  Smoker:  YES   ETOH use:  unknown  Ilicit Drug use:  unknown    FAMILY HISTORY:  Family history of cirrhosis of liver (Mother)    Family history of renal failure (Sibling)    Family history of hypertension (Sibling)    Family history of diabetes mellitus (Sibling)    History of substance abuse in sibling (Sibling)        VITALS:  Vital Signs Last 24 Hrs  T(C): 36.5 (01 Aug 2023 05:08), Max: 37.2 (31 Jul 2023 17:29)  T(F): 97.7 (01 Aug 2023 05:08), Max: 98.9 (31 Jul 2023 17:29)  HR: 96 (01 Aug 2023 07:30) (96 - 134)  BP: 173/85 (01 Aug 2023 07:30) (160/73 - 180/83)  BP(mean): --  RR: 20 (01 Aug 2023 05:08) (18 - 20)  SpO2: 93% (01 Aug 2023 05:08) (92% - 93%)    Parameters below as of 01 Aug 2023 05:08  Patient On (Oxygen Delivery Method): nasal cannula  O2 Flow (L/min): 4      LABS/DIAGNOSTIC TESTS:                          7.5    5.57  )-----------( 100      ( 31 Jul 2023 23:40 )             24.5     WBC Count: 5.57 K/uL (07-31 @ 23:40)  WBC Count: 5.08 K/uL (07-31 @ 09:00)  WBC Count: 4.87 K/uL (07-30 @ 03:07)      07-31    139  |  100  |  88<H>  ----------------------------<  88  4.3   |  24  |  13.80<H>    Ca    8.3<L>      31 Jul 2023 09:00  Phos  6.1     07-31  Mg     3.0     07-31        Urinalysis Basic - ( 31 Jul 2023 09:00 )    Color: x / Appearance: x / SG: x / pH: x  Gluc: 88 mg/dL / Ketone: x  / Bili: x / Urobili: x   Blood: x / Protein: x / Nitrite: x   Leuk Esterase: x / RBC: x / WBC x   Sq Epi: x / Non Sq Epi: x / Bacteria: x                LACTATE:    ABG -     CULTURES:             RADIOLOGY:< from: NM Hepatobiliary Imaging w/ RX (07.31.23 @ 10:44) >  ACC: [id number] EXAM:  NM HEPATOBILIARY IMG W RX   ORDERED BY: SHAKIR MAC     PROCEDURE DATE:  07/31/2023          INTERPRETATION:  CLINICAL INFORMATION: 62-year-old man with RIGHT UPPER   quadrant pain referred for evaluation of acute cholecystitis.    DURATION of DYNAMIC SERIES: 45 minutes  RADIOPHARMACEUTICAL: 3.2 mCi Tc-99m-Mebrofenin, I.V.    TECHNIQUE: Dynamic imaging of the anterior abdomen was performed   following radiopharmaceutical injection. Patient declined static images.    COMPARISON: None    OTHER STUDIES USED FOR CORRELATION: CT abdomen/pelvis 7/30/2023    FINDINGS: There is prompt, homogeneous uptake of radiopharmaceutical by   the hepatocytes. Activity is seen in the gallbladder at approximately 15   minutes. Patient declined further imaging before bowel was visualized.   There is fair clearance of activity from the liver by the end of the   obtained images..    IMPRESSION: Patient declined to complete the full exam and bowel was not   visualized on the available images. Otherwise unremarkable exam with no   evidence of acute cholecystitis or biliary obstruction.        --- End of Report ---            JD TUCKER MD; Attending Radiologist  This document has been electronically signed. Jul 31 2023 10:59AM    < end of copied text >  -----------------------------------------------------------------------------------------------------------------------------------------------------  ACC: [id number] EXAM:  CT ABDOMEN AND PELVIS   ORDERED BY: ALEXANDER MATIAS     PROCEDURE DATE:  07/30/2023          INTERPRETATION:  CLINICAL INFORMATION: Worsening nausea and vomiting with   dark colored emesis.    COMPARISON: 7/26/2023    CONTRAST/COMPLICATIONS:  IV Contrast: NONE  Oral Contrast: NONE  Complications: None reported at time of study completion    PROCEDURE:  CT of the Abdomen and Pelvis was performed.  Sagittal and coronal reformats were performed.    FINDINGS: The examination is limited by lack of IV contrast.  LOWER CHEST: Small bilateral pleural effusions. Small bilateral   atelectasis. Nonspecific groundglass densities in the right lower lung   zone; clinical correlation with pneumonia is suggested. Cardiomegaly and   mild pericardial effusion, unchanged.    LIVER: Within normal limits.  BILE DUCTS: Dilated common bile duct again noted.  GALLBLADDER: Small gallstones. Nonspecific trace pericholecystic fluid.  SPLEEN: Within normal limits.  PANCREAS: Dilated main pancreaticduct is again noted.  ADRENALS: The right adrenal appears unremarkable. Nonspecific left   adrenal thickening.  KIDNEYS/URETERS: Within normal limits except for a few small hypodense   lesions in the kidneys bilaterally, representing probable cysts.    BLADDER: Underdistended.  REPRODUCTIVE ORGANS: The prostate and seminal vesicles appear grossly   unremarkable.    BOWEL: No bowel obstruction. Appendix unremarkable. Nonspecific mild   distal esophageal mural thickening.  PERITONEUM: Small ascites. No free air.  VESSELS: Calcified atherosclerotic disease.  RETROPERITONEUM/LYMPH NODES: No lymphadenopathy.  ABDOMINAL WALL: Anasarca again noted.  BONES: No acute CT findings.    IMPRESSION: Small gallstones. Nonspecific trace pericholecystic fluid..   If there is a clinical suspicion for acute cholecystitis, gallbladder   ultrasound/HIDA scan may be pursued for further evaluation.    Stable dilated common bile duct and main pancreatic duct. Please see   previous MRCP dated 2/23/2023.    No bowel obstruction. Appendix unremarkable. Nonspecific mild distal   esophageal mural thickening.    Small ascites.    Anasarca.    Small bilateral pleural effusions. Small bilateral atelectasis.   Nonspecific groundglass densities in the right lower lungzone; clinical   correlation with pneumonia is suggested.    Cardiomegaly and mild pericardial effusion, unchanged.    --- End of Report ---            ROSALBA GREEN MD; Attending Radiologist  This document has been electronically signed. Jul 30 2023  2:23PM    < end of copied text >  -----------------------------------------------------------------------------------------------------------------------------------------------------------------  ACC: [id number] EXAM:  XR CHEST PORTABLE IMMED 1V   ORDERED BY: JULIETA MUJICA     PROCEDURE DATE:  07/30/2023          INTERPRETATION:  AP semisupine chest on July 30, 2023 at 9:14 AM. 2   images. Patient is short of breath.    Gross heart enlargement again noted. There is increasing perihilar   infiltrate now mild to moderate compared to July 26.    IMPRESSION: Increasing CHF. Gross heart enlargement again noted.    --- End of Report ---            ARASH REYNOSO MD; Attending Radiologist  This document has been electronically signed. Jul 30 2023  4:03PM    < end of copied text >  --------------------------------------------------------------------------------------------------------------------------------------------------------  ACC: [id number] EXAM:  CT ABDOMEN AND PELVIS   ORDERED BY: JAD MATOS     ACC: [id number] EXAM:  CT CHEST   ORDERED BY: JAD MATOS     PROCEDURE DATE:  07/26/2023          INTERPRETATION:  CLINICAL INFORMATION: ESRD on hemodialysis. Fluid   overload.    COMPARISON: 2/21/2023. 12/26/2021.    CONTRAST/COMPLICATIONS:  IV Contrast: NONE  Oral Contrast: NONE  Complications: None reported at time of study completion    PROCEDURE:  CT of the Chest, Abdomen and Pelvis was performed.  Sagittal and coronal reformats were performed.    FINDINGS:  CHEST:  LUNGS AND LARGE AIRWAYS: Patent central airways. Mild interlobular septal   thickening and groundglass opacities. Atelectasis in the anterior left   lower lobe and in the lingula.  PLEURA: Small right andtrace left pleural effusions.  VESSELS: Atherosclerotic changes of the aorta and coronary arteries.   Stent, possibly in the right axillary vein.  HEART: Cardiomegaly. No pericardial effusion.  MEDIASTINUM AND PADMINI: No lymphadenopathy.  CHEST WALL AND LOWER NECK: Within normal limits.    ABDOMEN AND PELVIS:  LIVER: Within normal limits.  BILE DUCTS: Mild dilatation of the biliary tree is also unchanged.  GALLBLADDER: Within normal limits.  SPLEEN: Within normal limits.  PANCREAS: A dilated proximal pancreatic duct is again noted and unchanged   since recent MRI.  ADRENALS: Within normal limits.  KIDNEYS/URETERS: Bilateral atrophic kidneys.    BLADDER: Decompressed and could not be evaluated.  REPRODUCTIVE ORGANS: Prostate within normal limits.    BOWEL: No bowel obstruction. Appendix is normal. Retained contrast within   the large bowel which contains moderate amount of stool but is otherwise   is normal in appearance.  PERITONEUM: No ascites.  VESSELS: Atherosclerotic changes.  RETROPERITONEUM/LYMPH NODES: No lymphadenopathy.  ABDOMINAL WALL: Anasarca.  BONES: Degenerative changes.    IMPRESSION:  Mild pulmonary edema and marked anasarca.  A dilated proximal pancreatic duct and mildly dilated biliary tree are   without significant change.      --- End of Report ---            AXEL GOMES MD; Attending Radiologist  This document has been electronically signed. Jul 26 2023  6:26PM    < end of copied text >        ROS  [  ] UNABLE TO ELICIT, limited as he is uncooperative

## 2023-08-01 NOTE — CHART NOTE - NSCHARTNOTEFT_GEN_A_CORE
EVENT: Notified by nurse regarding patient with coffee ground emesis     62y year old  Male with Coffee ground emesis, tachy cardia and hypertensive.     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 Last HD 7/22. Pt states he often missed HD sessions.  patient states he missed this HD session due to mild pain in left leg, patient remains able to ambulate. Pt complains of right leg swelling and states he does not make any urine. Patient states he was having mild shortness of breath.  Pt denies any fever, chills, N/V/D, constipation, CP, numbness or tingling (26 Jul 2023 19:20)      T(C): 37 (07-31-23 @ 21:53), Max: 37.5 (07-31-23 @ 07:56)  HR: 100 (07-31-23 @ 17:29) (99 - 104)  BP: 170/88 (07-31-23 @ 21:53) (131/61 - 171/77)  RR: 18 (07-31-23 @ 21:53) (18 - 18)  SpO2: 92% (07-31-23 @ 21:53) (92% - 96%)    Focus Physical Examination:    Data:                        7.5    5.57  )-----------( 100      ( 31 Jul 2023 23:40 )             24.5     07-31    139  |  100  |  88<H>  ----------------------------<  88  4.3   |  24  |  13.80<H>    Ca    8.3<L>      31 Jul 2023 09:00  Phos  6.1     07-31  Mg     3.0     07-31    TPro  6.6  /  Alb  3.1<L>  /  TBili  0.7  /  DBili  x   /  AST  26  /  ALT  41  /  AlkPhos  97  07-30    LIVER FUNCTIONS - ( 30 Jul 2023 03:07 )  Alb: 3.1 g/dL / Pro: 6.6 g/dL / ALK PHOS: 97 U/L / ALT: 41 U/L DA / AST: 26 U/L / GGT: x               Assessment/Plan:  62y year old  Male with coffee ground emesis. likely Upper GI bleed     Type and Screen   IV Access  F/U  CBC  transfuse PRN for hgb <7  Continue IV PPI Protonix   Consider GI/Hemo  consult in AM  Monitor CBC EVENT: Notified by nurse regarding patient with coffee ground emesis     62y year old  Male with Coffee ground emesis, tachycardia and hypertensive. Patient followed by surg and GI.     HPI:  Patient is 62 year old male from [facility], walks with RW, with PMH of ESRD on HD (TTS), BKA- L w/prosthetic leg, DM, Left eye blindness, CHF, CAD, HTN, HLD, osteoporosis, bronchitis, current smoker, and anemia who presents with complaint of missed dialysis. Last HD 7/22. Pt states he often missed HD sessions.  patient states he missed this HD session due to mild pain in left leg, patient remains able to ambulate. Pt complains of right leg swelling and states he does not make any urine. Patient states he was having mild shortness of breath.  Pt denies any fever, chills, N/V/D, constipation, CP, numbness or tingling (26 Jul 2023 19:20)      T(C): 37 (07-31-23 @ 21:53), Max: 37.5 (07-31-23 @ 07:56)  HR: 100 (07-31-23 @ 17:29) (99 - 104)  BP: 170/88 (07-31-23 @ 21:53) (131/61 - 171/77)  RR: 18 (07-31-23 @ 21:53) (18 - 18)  SpO2: 92% (07-31-23 @ 21:53) (92% - 96%)    Focus Physical Examination:    Data:                        7.5    5.57  )-----------( 100      ( 31 Jul 2023 23:40 )             24.5     07-31    139  |  100  |  88<H>  ----------------------------<  88  4.3   |  24  |  13.80<H>    Ca    8.3<L>      31 Jul 2023 09:00  Phos  6.1     07-31  Mg     3.0     07-31    TPro  6.6  /  Alb  3.1<L>  /  TBili  0.7  /  DBili  x   /  AST  26  /  ALT  41  /  AlkPhos  97  07-30    LIVER FUNCTIONS - ( 30 Jul 2023 03:07 )  Alb: 3.1 g/dL / Pro: 6.6 g/dL / ALK PHOS: 97 U/L / ALT: 41 U/L DA / AST: 26 U/L / GGT: x               Assessment/Plan:  62y year old  Male with coffee ground emesis,  concern for possible SBO vs Gastric ulcer - CT A/P was neg. for SBO  PLAN:   Type and Screen   IV Access  F/U  CBC  transfuse PRN for hgb <7  Continue IV PPI Protonix   Followed by GI  c/w zofran PRN  NPO for now  monitor CBC  Surgery following, no surgery  GI following, no endoscopy.     Elevated B/P Hypertension-   - Clonidine 0.1 mg PO x 1 dose    - cont close monitor    FOLLOW-UP: Reassess B/P for effectiveness of above intervention

## 2023-08-01 NOTE — PROGRESS NOTE ADULT - PROBLEM SELECTOR PLAN 4
pt p/w intractable Vomiting and coffee ground emesis  CT A/P was neg. for SBO  Patient also had a BM on 7/31  switch to IV protonix 40 mg BID  c/w zofran PRN  NPO for now  monitor CBC  Surgery following, no surgery  GI following, no endoscopy

## 2023-08-01 NOTE — PROGRESS NOTE ADULT - SUBJECTIVE AND OBJECTIVE BOX
Patient is a 62y old  Male who presents with a chief complaint of Missed dialysis (01 Aug 2023 08:41)    PATIENT IS SEEN AND EXAMINED IN MEDICAL FLOOR.  NGT [    ]    JEREMY [   ]      GT [   ]    ALLERGIES:  No Known Drug Allergies  fish (Rash)  liver (Anaphylaxis)      Daily     Daily     VITALS:    Vital Signs Last 24 Hrs  T(C): 36.5 (01 Aug 2023 05:08), Max: 37.2 (31 Jul 2023 17:29)  T(F): 97.7 (01 Aug 2023 05:08), Max: 98.9 (31 Jul 2023 17:29)  HR: 96 (01 Aug 2023 07:30) (96 - 134)  BP: 173/85 (01 Aug 2023 07:30) (160/73 - 180/83)  BP(mean): --  RR: 20 (01 Aug 2023 05:08) (18 - 20)  SpO2: 93% (01 Aug 2023 05:08) (92% - 93%)    Parameters below as of 01 Aug 2023 05:08  Patient On (Oxygen Delivery Method): nasal cannula  O2 Flow (L/min): 4      LABS:    CBC Full  -  ( 31 Jul 2023 23:40 )  WBC Count : 5.57 K/uL  RBC Count : 2.81 M/uL  Hemoglobin : 7.5 g/dL  Hematocrit : 24.5 %  Platelet Count - Automated : 100 K/uL  Mean Cell Volume : 87.2 fl  Mean Cell Hemoglobin : 26.7 pg  Mean Cell Hemoglobin Concentration : 30.6 gm/dL  Auto Neutrophil # : x  Auto Lymphocyte # : x  Auto Monocyte # : x  Auto Eosinophil # : x  Auto Basophil # : x  Auto Neutrophil % : x  Auto Lymphocyte % : x  Auto Monocyte % : x  Auto Eosinophil % : x  Auto Basophil % : x      07-31    139  |  100  |  88<H>  ----------------------------<  88  4.3   |  24  |  13.80<H>    Ca    8.3<L>      31 Jul 2023 09:00  Phos  6.1     07-31  Mg     3.0     07-31      CAPILLARY BLOOD GLUCOSE      POCT Blood Glucose.: 106 mg/dL (01 Aug 2023 07:57)  POCT Blood Glucose.: 62 mg/dL (01 Aug 2023 06:53)  POCT Blood Glucose.: 97 mg/dL (01 Aug 2023 01:17)  POCT Blood Glucose.: 107 mg/dL (31 Jul 2023 22:54)  POCT Blood Glucose.: 120 mg/dL (31 Jul 2023 21:29)  POCT Blood Glucose.: 111 mg/dL (31 Jul 2023 17:20)  POCT Blood Glucose.: 97 mg/dL (31 Jul 2023 12:05)          Creatinine Trend: 13.80<--, 11.30<--, 16.60<--, 15.60<--, 15.80<--, 17.20<--  I&O's Summary    31 Jul 2023 07:01  -  01 Aug 2023 07:00  --------------------------------------------------------  IN: 200 mL / OUT: 1 mL / NET: 199 mL            .Blood Blood-Peripheral  05-28 @ 14:07   No Growth Final  --  --          MEDICATIONS:    MEDICATIONS  (STANDING):  albuterol/ipratropium for Nebulization 3 milliLiter(s) Nebulizer every 6 hours  amLODIPine   Tablet 10 milliGRAM(s) Oral daily  aspirin enteric coated 81 milliGRAM(s) Oral daily  atorvastatin 40 milliGRAM(s) Oral at bedtime  budesonide 160 MICROgram(s)/formoterol 4.5 MICROgram(s) Inhaler 2 Puff(s) Inhalation two times a day  cefepime   IVPB 1000 milliGRAM(s) IV Intermittent every 24 hours  dextrose 5%. 1000 milliLiter(s) (50 mL/Hr) IV Continuous <Continuous>  dextrose 5%. 1000 milliLiter(s) (100 mL/Hr) IV Continuous <Continuous>  dextrose 50% Injectable 25 Gram(s) IV Push once  dextrose 50% Injectable 12.5 Gram(s) IV Push once  dextrose 50% Injectable 25 Gram(s) IV Push once  epoetin beverley (PROCRIT) Injectable 70641 Unit(s) IV Push <User Schedule>  folic acid 1 milliGRAM(s) Oral daily  furosemide    Tablet 80 milliGRAM(s) Oral daily  glucagon  Injectable 1 milliGRAM(s) IntraMuscular once  hydrALAZINE 25 milliGRAM(s) Oral three times a day  insulin glargine Injectable (LANTUS) 4 Unit(s) SubCutaneous at bedtime  latanoprost 0.005% Ophthalmic Solution 1 Drop(s) Both EYES at bedtime  levothyroxine 25 MICROGram(s) Oral daily  LORazepam     Tablet 0.5 milliGRAM(s) Oral <User Schedule>  metoprolol succinate ER 50 milliGRAM(s) Oral daily  metroNIDAZOLE  IVPB 500 milliGRAM(s) IV Intermittent every 8 hours  pantoprazole  Injectable 40 milliGRAM(s) IV Push every 12 hours  sertraline 50 milliGRAM(s) Oral daily  sucralfate 1 Gram(s) Oral four times a day      MEDICATIONS  (PRN):  dextrose Oral Gel 15 Gram(s) Oral once PRN Blood Glucose LESS THAN 70 milliGRAM(s)/deciliter  haloperidol    Injectable 0.5 milliGRAM(s) IntraMuscular every 8 hours PRN Agitation  ondansetron Injectable 4 milliGRAM(s) IV Push every 6 hours PRN Nausea and/or Vomiting      REVIEW OF SYSTEMS:                           ALL ROS DONE [ X   ]    CONSTITUTIONAL:  LETHARGIC [   ], FEVER [   ], UNRESPONSIVE [   ]  CVS:  CP  [   ], SOB, [   ], PALPITATIONS [   ], DIZZYNESS [   ]  RS: COUGH [   ], SPUTUM [   ]  GI: ABDOMINAL PAIN [   ], NAUSEA [   ], VOMITINGS [   ], DIARRHEA [   ], CONSTIPATION [   ]  :  DYSURIA [   ], NOCTURIA [   ], INCREASED FREQUENCY [   ], DRIBLING [   ],  SKELETAL: PAINFUL JOINTS [   ], SWOLLEN JOINTS [   ], NECK ACHE [   ], LOW BACK ACHE [   ],  SKIN : ULCERS [   ], RASH [   ], ITCHING [   ]  CNS: HEAD ACHE [   ], DOUBLE VISION [   ], BLURRED VISION [   ], AMS / CONFUSION [   ], SEIZURES [   ], WEAKNESS [   ],TINGLING / NUMBNESS [   ]    PHYSICAL EXAMINATION:  GENERAL APPEARANCE: NO DISTRESS,    ANASARCA ++  HEENT:  NO PALLOR, NO  JVD,  NO   NODES, NECK SUPPLE  CVS: S1 +, S2 +,   RS: AEEB,  OCCASIONAL  RALES +,   CRACKLES+ AT LUNG BASES  ABD: SOFT, NT, NO, BS +  EXT: LEFT BKA  SKIN: WARM,   SKELETAL:  ROM ACCEPTABLE  CNS:  AAO X  3      RADIOLOGY :      ASSESSMENT :     Hypervolemia    No pertinent past medical history    Hypertension    Adrenal insufficiency    CKD (chronic kidney disease)    Anemia    Glaucoma    Coronary artery disease    HLD (hyperlipidemia)    Peripheral vascular disease    Spinal stenosis of lumbosacral region    Hyperparathyroidism    Diabetes mellitus    Diabetic neuropathy    Contracture of hand    Osteoarthritis    Osteoporosis    Vision loss of left eye    ESRD on hemodialysis    Cataract    BPH (benign prostatic hyperplasia)    UTI (urinary tract infection)    Bladder mass    H/O hematuria    Osteoporosis    Vision loss of right eye    Depression    Chronic GERD    Osteomyelitis of vertebra    CHF (congestive heart failure)    No significant past surgical history    Below knee amputation status, left    History of right cataract extraction    History of left cataract extraction    S/P arteriovenous (AV) fistula creation    H/O hematuria    H/O transurethral destruction of bladder lesion    History of excision of mass        PLAN:  HPI:  Patient is 62 year old male from [facility], walks with RW, with PMH of ESRD on HD (TTS), BKA- L w/prosthetic leg, DM, Left eye blindness, CHF, CAD, HTN, HLD, osteoporosis, bronchitis, current smoker, and anemia who presents with complaint of missed dialysis. Last HD 7/22. Pt states he often missed HD sessions.  patient states he missed this HD session due to mild pain in left leg, patient remains able to ambulate. Pt complains of right leg swelling and states he does not make any urine. Patient states he was having mild shortness of breath.  Pt denies any fever, chills, N/V/D, constipation, CP, numbness or tingling (26 Jul 2023 19:20)    # CASE DISCUSSED AT LENGTH WITH PATIENT'S BROTHER ARTEMIO @ [phone]. DISCUSSED REGARDING CURRENT CLINICAL CONDITION, RECOMMENDED EVALUATIONS AND INTERVENTIONS. ALL QUESTIONS ANSWERED. DISCUSSED THAT PROGNOSIS IS POOR/GRIM GIVEN UNDERLYING MEDICAL CONDITIONS. ALSO DISCUSSED THAT DESPITE VERBALIZING UNDERSTANDING OF MEDICAL CONDITIONS AND RECOMMENDED INTERVENTIONS - PATIENT PERSISTENTLY REMAINS NONCOMPLIANT. TEAM HAS OFFERED PATIENT EMOTIONAL SUPPORT AND OFFERED MEDICATION FOR MOOD.  - [7/31] - BROTHER UPDATED BY TEAM    # PATIENT W/ HISTORY OF ROUTINE NONCOMPLIANCE W/ LIFE-SUSTAINING TREATMENT [HD], EVALUATIONS AND INTERVENTIONS - COUNSELLED AT LENGTH TO REMAIN COMPLIANT. D/W PATIENT THAT PROGNOSIS IS GRIM/POOR. HE VERBALIZED UNDERSTANDING. REGARDING GOC - PATIENT WISHES FOR FULL CODE. PALLIATIVE CARE CONSULTED. PSYCHIATRY CONSULTED.  - PATIENT IS ORIENTED TO PERSON, PLACE AND CIRCUMSTANCE. HE EXPRESSED THAT HE DOES GROW IMPATIENT DURING DIALYSIS SESSIONS AND HAS BEEN LEAVING SESSIONS SOONER THAN PRESCRIBED. IN DISCUSSION THAT RISKS OF DEFERRING COMPLETE HD - INCLUDE BUT ARE NOT LIMITED TO WORSENING CLINICAL CONDITION, ELECTROLYTE ABNORMALITIES, ARRHYTHMIA AND DEATH. HE VERBALIZED UNDERSTANDING. HE REFUSES TO CONSIDER A NURSING HOME WITH ONSITE HD.   - D/W PATIENT THAT REPEATEDLY REFUSING INTERVENTIONS AND ASSESSMENTS IS NOT IN LINE WITH HIS WISHES TO IMPROVE. PATIENT VERBALIZED UNDERSTANDING AND AGREEMENT. IN DISCUSSION, REGARDING POSSIBLE ETIOLOGY - PSYCHIATRY WAS CONSULTED TO DISCUSS MOOD, PATIENT DOES EXPRESS THAT HE HAS SOME DEPRESSION GIVEN HIS MEDICAL CONDITIONS. BUT HE DENIES THAT THIS IS THE ETIOLOGY FOR NONCOMPLIANCE. HE EXPLAINS THAT HE DOES NOT LIKE BEING CONFINED TO A DIALYSIS MACHINE. OFFERED FOR PATIENT TO CONSIDER NURSING HOME W/ ONSITE HD. PATIENT WISHES TO CONTINUE TO LIVE IN ASSISTED LIVING.    # ACUTE ON CHRONIC HYPOXIC RESPIRATORY FAILURE S/T PULMONARY EDEMA - S/T INCOMPLETE HD SESSIONS ; ANASARCA  # HYPERKALEMIA  # HX OF COPD + S/P RECENT MULTIFOCAL PNEUMONIA ; R/O ASPIRATION PNEUMONIA  # UNCONTROLLED HTN  # ESRD ON HD TTS - W/ HX OF NONCOMPLIANCE    - TELEMETRY  - HYDRALAZINE, METOPROLOL AND NORVASC  - LOKELMA  - SUPPLEMENTAL O2  - PLANNED FOR HD  - NEPHROLOGY CONSULT    - PLANNED FOR HD 7/26, 7/27 [INCOMPLETE SESSIONS]  - REFUSED HD ON 7/28  - UNDERWENT HD 7/29 [COMPLETE SESSION]    - [7/30] - OVERNIGHT RAPID RESPONSE S/T HYPOXIA - SELF-LIMITED - PATIENT IMPROVED S/P O2 SUPPLEMENT  - EMPIRICALLY STARTED ON CEFEPIME + FLAGYLL, F/U BCX       # RECURRENT INTRACTABLE NAUSEA/VOMITING, ? COFFEE GROUND EMESIS  # GASTROPARESIS  # HISTORY OF ESOPHAGITIS AND DUODENITIS [1/2021], HX OF GASTROPARESIS W/ EVIDENCE OF THICKENED ESOPHAGUS ON PREVIOUS CT SCAN   # PANCREAS W/ DOUBLE DUCT SIGN    - MONITORING HGB, PLACED ON PPI BID , CARAFATE QID  - PRN ANTIEMETICS  - MONITORING FOR SYMPTOMS  - WHILE ON DIET PATIENT REPEATEDLY COUNSELLED TO CHEW FOOD CAUTIOUSLY AND CONSUME SMALL BITES W/ REGULAR CLEARING WITH WATER BETWEEN BITES    - NPO  - NOTED CT A/P    - S/P RECENT PRBC TRANSFUSION     - GI CONSULT  - SURGERY CONSULT    # R/O CHOLECYSTITIS  - PLACED ON CEFEPIME, F/U BCX  - NOTED CT A/P  - HIDA SCAN - NEGATIVE  - SURGERY CONSULT    # ELEVATED TROPONINS - ? DEMAND ISCHEMIA    - MONITOR ON TELEMETRY  - TREND TROPONINS  - CARDIOLOGY CONSULT    # GASTROPARESIS  # UNDERLYING DM  - SSI + FS  - COUNSELLED TO COMPLY WITH HD TO REDUCE UREMIA    # DEPRESSION  - PLACED ON ZOLOFT  - PSYCHIATRY CONSULT    # PATIENT UNDERGOING OUTPATIENT WORKUP FOR CENTRAL VENOUS STENOSIS THROUGH NEPHROLOGY TEAM  - ? s/p STENT PLACEMENT    # ANEMIA OF CKD  # HX OF PANCYTOPENIA   - TREND HGB, TRANSFUSION THRESHOLD HGB < 7; PATIENT STILL INTERMITTENTLY REFUSING BLOODWORK, COUNSELLED TO COMPLY  - TYPE AND SCREEN  - ON EPO  - DENIES RECENT HEMATEMESIS, MELENA, HEMATOCHEZIA    - S/P RECENT PRBC TRANSFUSION  - S/P PRBC TRANSFUSION 7/29    - PPI BID, CARAFATE QID    # SEVERE PROTEIN CALORIE MALNUTRITION, FAILURE TO THRIVE   -  TEMPORAL WASTING, LOSS OF MUSCLE MASS FROM SHOULDER AND HIP GIRDLE  - NUTRITIONAL SUPPLEMENT    # HLD  # PARTIALLY BLIND  # S/P LEFT BKA  # HX OF PVD  # LS SPINAL STENOSIS  # GI AND DVT PPX   Patient is a 62y old  Male who presents with a chief complaint of Missed dialysis (01 Aug 2023 08:41)    PATIENT IS SEEN AND EXAMINED IN MEDICAL FLOOR. PATIENT STILL WITH INTERMITTENT NAUSEA W/ SMALL VOLUME COFFEE GROUND EMESIS [IMPROVED].       ALLERGIES:  No Known Drug Allergies  fish (Rash)  liver (Anaphylaxis)      VITALS:    Vital Signs Last 24 Hrs  T(C): 36.5 (01 Aug 2023 05:08), Max: 37.2 (31 Jul 2023 17:29)  T(F): 97.7 (01 Aug 2023 05:08), Max: 98.9 (31 Jul 2023 17:29)  HR: 96 (01 Aug 2023 07:30) (96 - 134)  BP: 173/85 (01 Aug 2023 07:30) (160/73 - 180/83)  BP(mean): --  RR: 20 (01 Aug 2023 05:08) (18 - 20)  SpO2: 93% (01 Aug 2023 05:08) (92% - 93%)    Parameters below as of 01 Aug 2023 05:08  Patient On (Oxygen Delivery Method): nasal cannula  O2 Flow (L/min): 4      LABS:    CBC Full  -  ( 31 Jul 2023 23:40 )  WBC Count : 5.57 K/uL  RBC Count : 2.81 M/uL  Hemoglobin : 7.5 g/dL  Hematocrit : 24.5 %  Platelet Count - Automated : 100 K/uL  Mean Cell Volume : 87.2 fl  Mean Cell Hemoglobin : 26.7 pg  Mean Cell Hemoglobin Concentration : 30.6 gm/dL  Auto Neutrophil # : x  Auto Lymphocyte # : x  Auto Monocyte # : x  Auto Eosinophil # : x  Auto Basophil # : x  Auto Neutrophil % : x  Auto Lymphocyte % : x  Auto Monocyte % : x  Auto Eosinophil % : x  Auto Basophil % : x      07-31    139  |  100  |  88<H>  ----------------------------<  88  4.3   |  24  |  13.80<H>    Ca    8.3<L>      31 Jul 2023 09:00  Phos  6.1     07-31  Mg     3.0     07-31      CAPILLARY BLOOD GLUCOSE      POCT Blood Glucose.: 106 mg/dL (01 Aug 2023 07:57)  POCT Blood Glucose.: 62 mg/dL (01 Aug 2023 06:53)  POCT Blood Glucose.: 97 mg/dL (01 Aug 2023 01:17)  POCT Blood Glucose.: 107 mg/dL (31 Jul 2023 22:54)  POCT Blood Glucose.: 120 mg/dL (31 Jul 2023 21:29)  POCT Blood Glucose.: 111 mg/dL (31 Jul 2023 17:20)  POCT Blood Glucose.: 97 mg/dL (31 Jul 2023 12:05)        Creatinine Trend: 13.80<--, 11.30<--, 16.60<--, 15.60<--, 15.80<--, 17.20<--  I&O's Summary    31 Jul 2023 07:01  -  01 Aug 2023 07:00  --------------------------------------------------------  IN: 200 mL / OUT: 1 mL / NET: 199 mL        .Blood Blood-Peripheral  05-28 @ 14:07   No Growth Final  --  --          MEDICATIONS:    MEDICATIONS  (STANDING):  albuterol/ipratropium for Nebulization 3 milliLiter(s) Nebulizer every 6 hours  amLODIPine   Tablet 10 milliGRAM(s) Oral daily  aspirin enteric coated 81 milliGRAM(s) Oral daily  atorvastatin 40 milliGRAM(s) Oral at bedtime  budesonide 160 MICROgram(s)/formoterol 4.5 MICROgram(s) Inhaler 2 Puff(s) Inhalation two times a day  cefepime   IVPB 1000 milliGRAM(s) IV Intermittent every 24 hours  dextrose 5%. 1000 milliLiter(s) (50 mL/Hr) IV Continuous <Continuous>  dextrose 5%. 1000 milliLiter(s) (100 mL/Hr) IV Continuous <Continuous>  dextrose 50% Injectable 25 Gram(s) IV Push once  dextrose 50% Injectable 12.5 Gram(s) IV Push once  dextrose 50% Injectable 25 Gram(s) IV Push once  epoetin beverley (PROCRIT) Injectable 36965 Unit(s) IV Push <User Schedule>  folic acid 1 milliGRAM(s) Oral daily  furosemide    Tablet 80 milliGRAM(s) Oral daily  glucagon  Injectable 1 milliGRAM(s) IntraMuscular once  hydrALAZINE 25 milliGRAM(s) Oral three times a day  insulin glargine Injectable (LANTUS) 4 Unit(s) SubCutaneous at bedtime  latanoprost 0.005% Ophthalmic Solution 1 Drop(s) Both EYES at bedtime  levothyroxine 25 MICROGram(s) Oral daily  LORazepam     Tablet 0.5 milliGRAM(s) Oral <User Schedule>  metoprolol succinate ER 50 milliGRAM(s) Oral daily  metroNIDAZOLE  IVPB 500 milliGRAM(s) IV Intermittent every 8 hours  pantoprazole  Injectable 40 milliGRAM(s) IV Push every 12 hours  sertraline 50 milliGRAM(s) Oral daily  sucralfate 1 Gram(s) Oral four times a day      MEDICATIONS  (PRN):  dextrose Oral Gel 15 Gram(s) Oral once PRN Blood Glucose LESS THAN 70 milliGRAM(s)/deciliter  haloperidol    Injectable 0.5 milliGRAM(s) IntraMuscular every 8 hours PRN Agitation  ondansetron Injectable 4 milliGRAM(s) IV Push every 6 hours PRN Nausea and/or Vomiting      REVIEW OF SYSTEMS:                           ALL ROS DONE [ X   ]    CONSTITUTIONAL:  LETHARGIC [   ], FEVER [   ], UNRESPONSIVE [   ]  CVS:  CP  [   ], SOB, [   ], PALPITATIONS [   ], DIZZYNESS [   ]  RS: COUGH [   ], SPUTUM [   ]  GI: ABDOMINAL PAIN [   ], NAUSEA [   ], VOMITINGS [   ], DIARRHEA [   ], CONSTIPATION [   ]  :  DYSURIA [   ], NOCTURIA [   ], INCREASED FREQUENCY [   ], DRIBLING [   ],  SKELETAL: PAINFUL JOINTS [   ], SWOLLEN JOINTS [   ], NECK ACHE [   ], LOW BACK ACHE [   ],  SKIN : ULCERS [   ], RASH [   ], ITCHING [   ]  CNS: HEAD ACHE [   ], DOUBLE VISION [   ], BLURRED VISION [   ], AMS / CONFUSION [   ], SEIZURES [   ], WEAKNESS [   ],TINGLING / NUMBNESS [   ]    PHYSICAL EXAMINATION:  GENERAL APPEARANCE: NO DISTRESS,    ANASARCA ++  HEENT:  NO PALLOR, NO  JVD,  NO   NODES, NECK SUPPLE  CVS: S1 +, S2 +,   RS: AEEB,  OCCASIONAL  RALES +,   CRACKLES+ AT LUNG BASES  ABD: SOFT, NT, NO, BS +  EXT: LEFT BKA  SKIN: WARM,   SKELETAL:  ROM ACCEPTABLE  CNS:  AAO X  3      RADIOLOGY :      ASSESSMENT :     Hypervolemia    No pertinent past medical history    Hypertension    Adrenal insufficiency    CKD (chronic kidney disease)    Anemia    Glaucoma    Coronary artery disease    HLD (hyperlipidemia)    Peripheral vascular disease    Spinal stenosis of lumbosacral region    Hyperparathyroidism    Diabetes mellitus    Diabetic neuropathy    Contracture of hand    Osteoarthritis    Osteoporosis    Vision loss of left eye    ESRD on hemodialysis    Cataract    BPH (benign prostatic hyperplasia)    UTI (urinary tract infection)    Bladder mass    H/O hematuria    Osteoporosis    Vision loss of right eye    Depression    Chronic GERD    Osteomyelitis of vertebra    CHF (congestive heart failure)    No significant past surgical history    Below knee amputation status, left    History of right cataract extraction    History of left cataract extraction    S/P arteriovenous (AV) fistula creation    H/O hematuria    H/O transurethral destruction of bladder lesion    History of excision of mass        PLAN:  HPI:  Patient is 62 year old male from [facility], walks with RW, with PMH of ESRD on HD (TTS), BKA- L w/prosthetic leg, DM, Left eye blindness, CHF, CAD, HTN, HLD, osteoporosis, bronchitis, current smoker, and anemia who presents with complaint of missed dialysis. Last HD 7/22. Pt states he often missed HD sessions.  patient states he missed this HD session due to mild pain in left leg, patient remains able to ambulate. Pt complains of right leg swelling and states he does not make any urine. Patient states he was having mild shortness of breath.  Pt denies any fever, chills, N/V/D, constipation, CP, numbness or tingling (26 Jul 2023 19:20)    # CASE DISCUSSED AT LENGTH WITH PATIENT'S BROTHER ARTEMIO @ [phone]. DISCUSSED REGARDING CURRENT CLINICAL CONDITION, RECOMMENDED EVALUATIONS AND INTERVENTIONS. ALL QUESTIONS ANSWERED. DISCUSSED THAT PROGNOSIS IS POOR/GRIM GIVEN UNDERLYING MEDICAL CONDITIONS. ALSO DISCUSSED THAT DESPITE VERBALIZING UNDERSTANDING OF MEDICAL CONDITIONS AND RECOMMENDED INTERVENTIONS - PATIENT PERSISTENTLY REMAINS NONCOMPLIANT. TEAM HAS OFFERED PATIENT EMOTIONAL SUPPORT AND OFFERED MEDICATION FOR MOOD. [8/1]    # PATIENT W/ HISTORY OF ROUTINE NONCOMPLIANCE W/ LIFE-SUSTAINING TREATMENT [HD], EVALUATIONS AND INTERVENTIONS - COUNSELLED AT LENGTH TO REMAIN COMPLIANT. D/W PATIENT THAT PROGNOSIS IS GRIM/POOR. HE VERBALIZED UNDERSTANDING. REGARDING GOC - PATIENT WISHES FOR FULL CODE. PALLIATIVE CARE CONSULTED. PSYCHIATRY CONSULTED.  - PATIENT IS ORIENTED TO PERSON, PLACE AND CIRCUMSTANCE. HE EXPRESSED THAT HE DOES GROW IMPATIENT DURING DIALYSIS SESSIONS AND HAS BEEN LEAVING SESSIONS SOONER THAN PRESCRIBED. IN DISCUSSION THAT RISKS OF DEFERRING COMPLETE HD - INCLUDE BUT ARE NOT LIMITED TO WORSENING CLINICAL CONDITION, ELECTROLYTE ABNORMALITIES, ARRHYTHMIA AND DEATH. HE VERBALIZED UNDERSTANDING. HE REFUSES TO CONSIDER A NURSING HOME WITH ONSITE HD.   - D/W PATIENT THAT REPEATEDLY REFUSING INTERVENTIONS AND ASSESSMENTS IS NOT IN LINE WITH HIS WISHES TO IMPROVE. PATIENT VERBALIZED UNDERSTANDING AND AGREEMENT. IN DISCUSSION, REGARDING POSSIBLE ETIOLOGY - PSYCHIATRY WAS CONSULTED TO DISCUSS MOOD, PATIENT DOES EXPRESS THAT HE HAS SOME DEPRESSION GIVEN HIS MEDICAL CONDITIONS. BUT HE DENIES THAT THIS IS THE ETIOLOGY FOR NONCOMPLIANCE. HE EXPLAINS THAT HE DOES NOT LIKE BEING CONFINED TO A DIALYSIS MACHINE. OFFERED FOR PATIENT TO CONSIDER NURSING HOME W/ ONSITE HD. PATIENT WISHES TO CONTINUE TO LIVE IN ASSISTED LIVING.    # ACUTE ON CHRONIC HYPOXIC RESPIRATORY FAILURE S/T PULMONARY EDEMA - S/T INCOMPLETE HD SESSIONS ; ANASARCA  # HYPERKALEMIA  # HX OF COPD + S/P RECENT MULTIFOCAL PNEUMONIA ; R/O ASPIRATION PNEUMONIA  # UNCONTROLLED HTN  # ESRD ON HD TTS - W/ HX OF NONCOMPLIANCE    - TELEMETRY  - HYDRALAZINE, METOPROLOL AND NORVASC  - LOKELMA  - SUPPLEMENTAL O2  - PLANNED FOR HD  - NEPHROLOGY CONSULT    - PLANNED FOR HD 7/26, 7/27 [INCOMPLETE SESSIONS]  - REFUSED HD ON 7/28, 7/31  - UNDERWENT HD 7/29 [COMPLETE SESSION]      - [7/30] - OVERNIGHT RAPID RESPONSE S/T HYPOXIA - SELF-LIMITED - PATIENT IMPROVED S/P O2 SUPPLEMENT  - EMPIRICALLY STARTED ON CEFEPIME + FLAGYLL, F/U BCX       # RECURRENT INTRACTABLE NAUSEA/VOMITING, ? COFFEE GROUND EMESIS  # GASTROPARESIS  # HISTORY OF ESOPHAGITIS AND DUODENITIS [1/2021], HX OF GASTROPARESIS W/ EVIDENCE OF THICKENED ESOPHAGUS ON PREVIOUS CT SCAN   # PANCREAS W/ DOUBLE DUCT SIGN    - MONITORING HGB, PLACED ON PPI BID , CARAFATE QID  - PRN ANTIEMETICS  - MONITORING FOR SYMPTOMS  - WHILE ON DIET PATIENT REPEATEDLY COUNSELLED TO CHEW FOOD CAUTIOUSLY AND CONSUME SMALL BITES W/ REGULAR CLEARING WITH WATER BETWEEN BITES    - NPO  - NOTED CT A/P    - S/P RECENT PRBC TRANSFUSION     - GI CONSULT  - SURGERY CONSULT    # R/O CHOLECYSTITIS  - PLACED ON CEFEPIME, F/U BCX  - NOTED CT A/P  - HIDA SCAN - NEGATIVE  - SURGERY CONSULT    # ELEVATED TROPONINS - ? DEMAND ISCHEMIA    - MONITOR ON TELEMETRY  - TREND TROPONINS  - CARDIOLOGY CONSULT    # GASTROPARESIS  # UNDERLYING DM  - SSI + FS  - COUNSELLED TO COMPLY WITH HD TO REDUCE UREMIA    # DEPRESSION  - PLACED ON ZOLOFT  - PSYCHIATRY CONSULT    # PATIENT UNDERGOING OUTPATIENT WORKUP FOR CENTRAL VENOUS STENOSIS THROUGH NEPHROLOGY TEAM  - ? s/p STENT PLACEMENT    # ANEMIA OF CKD  # HX OF PANCYTOPENIA   - TREND HGB, TRANSFUSION THRESHOLD HGB < 7; PATIENT STILL INTERMITTENTLY REFUSING BLOODWORK, COUNSELLED TO COMPLY  - TYPE AND SCREEN  - ON EPO  - DENIES RECENT HEMATEMESIS, MELENA, HEMATOCHEZIA    - S/P RECENT PRBC TRANSFUSION  - S/P PRBC TRANSFUSION 7/29    - PPI BID, CARAFATE QID    # SEVERE PROTEIN CALORIE MALNUTRITION, FAILURE TO THRIVE   -  TEMPORAL WASTING, LOSS OF MUSCLE MASS FROM SHOULDER AND HIP GIRDLE  - NUTRITIONAL SUPPLEMENT    # HLD  # PARTIALLY BLIND  # S/P LEFT BKA  # HX OF PVD  # LS SPINAL STENOSIS  # GI AND DVT PPX

## 2023-08-01 NOTE — PROGRESS NOTE ADULT - SUBJECTIVE AND OBJECTIVE BOX
INTERVAL HPI/OVERNIGHT EVENTS:  SEen and examined   Pt resting comfortably    MEDICATIONS  (STANDING):  albuterol/ipratropium for Nebulization 3 milliLiter(s) Nebulizer every 6 hours  amLODIPine   Tablet 10 milliGRAM(s) Oral daily  aspirin enteric coated 81 milliGRAM(s) Oral daily  atorvastatin 40 milliGRAM(s) Oral at bedtime  budesonide 160 MICROgram(s)/formoterol 4.5 MICROgram(s) Inhaler 2 Puff(s) Inhalation two times a day  cefepime   IVPB 1000 milliGRAM(s) IV Intermittent every 24 hours  dextrose 5%. 1000 milliLiter(s) (50 mL/Hr) IV Continuous <Continuous>  dextrose 5%. 1000 milliLiter(s) (100 mL/Hr) IV Continuous <Continuous>  dextrose 50% Injectable 25 Gram(s) IV Push once  dextrose 50% Injectable 12.5 Gram(s) IV Push once  dextrose 50% Injectable 25 Gram(s) IV Push once  epoetin beverley (PROCRIT) Injectable 84787 Unit(s) IV Push <User Schedule>  folic acid 1 milliGRAM(s) Oral daily  furosemide    Tablet 80 milliGRAM(s) Oral daily  glucagon  Injectable 1 milliGRAM(s) IntraMuscular once  hydrALAZINE 25 milliGRAM(s) Oral three times a day  insulin glargine Injectable (LANTUS) 4 Unit(s) SubCutaneous at bedtime  latanoprost 0.005% Ophthalmic Solution 1 Drop(s) Both EYES at bedtime  levothyroxine 25 MICROGram(s) Oral daily  LORazepam     Tablet 0.5 milliGRAM(s) Oral <User Schedule>  metoprolol succinate ER 50 milliGRAM(s) Oral daily  metroNIDAZOLE  IVPB 500 milliGRAM(s) IV Intermittent every 8 hours  pantoprazole  Injectable 40 milliGRAM(s) IV Push every 12 hours  sertraline 50 milliGRAM(s) Oral daily  sucralfate 1 Gram(s) Oral four times a day    MEDICATIONS  (PRN):  dextrose Oral Gel 15 Gram(s) Oral once PRN Blood Glucose LESS THAN 70 milliGRAM(s)/deciliter  haloperidol    Injectable 0.5 milliGRAM(s) IntraMuscular every 8 hours PRN Agitation  ondansetron Injectable 4 milliGRAM(s) IV Push every 6 hours PRN Nausea and/or Vomiting      Vital Signs Last 24 Hrs  T(C): 36.5 (01 Aug 2023 05:08), Max: 37.2 (31 Jul 2023 17:29)  T(F): 97.7 (01 Aug 2023 05:08), Max: 98.9 (31 Jul 2023 17:29)  HR: 96 (01 Aug 2023 07:30) (96 - 134)  BP: 173/85 (01 Aug 2023 07:30) (160/73 - 180/83)  BP(mean): --  RR: 20 (01 Aug 2023 05:08) (18 - 20)  SpO2: 93% (01 Aug 2023 05:08) (92% - 93%)    Parameters below as of 01 Aug 2023 05:08  Patient On (Oxygen Delivery Method): nasal cannula  O2 Flow (L/min): 4      Physical:  General: NAD  Abdomen: Soft nondistended, nontender.    I&O's Detail    31 Jul 2023 07:01  -  01 Aug 2023 07:00  --------------------------------------------------------  IN:    IV PiggyBack: 100 mL    IV PiggyBack: 100 mL  Total IN: 200 mL    OUT:    Stool (mL): 1 mL  Total OUT: 1 mL    Total NET: 199 mL          LABS:                        7.5    5.57  )-----------( 100      ( 31 Jul 2023 23:40 )             24.5             07-31    139  |  100  |  88<H>  ----------------------------<  88  4.3   |  24  |  13.80<H>    Ca    8.3<L>      31 Jul 2023 09:00  Phos  6.1     07-31  Mg     3.0     07-31        < from: NM Hepatobiliary Imaging w/ RX (07.31.23 @ 10:44) >  ACC: [id number] EXAM:  NM HEPATOBILIARY IMG W RX   ORDERED BY: SHAKIR MAC     PROCEDURE DATE:  07/31/2023          INTERPRETATION:  CLINICAL INFORMATION: 62-year-old man with RIGHT UPPER   quadrant pain referred for evaluation of acute cholecystitis.    DURATION of DYNAMIC SERIES: 45 minutes  RADIOPHARMACEUTICAL: 3.2 mCi Tc-99m-Mebrofenin, I.V.    TECHNIQUE: Dynamic imaging of the anterior abdomen was performed   following radiopharmaceutical injection. Patient declined static images.    COMPARISON: None    OTHER STUDIES USED FOR CORRELATION: CT abdomen/pelvis 7/30/2023    FINDINGS: There is prompt, homogeneous uptake of radiopharmaceutical by   the hepatocytes. Activity is seen in the gallbladder at approximately 15   minutes. Patient declined further imaging before bowel was visualized.   There is fair clearance of activity from the liver by the end of the   obtained images..    IMPRESSION: Patient declined to complete the full exam and bowel was not   visualized on the available images. Otherwise unremarkable exam with no   evidence of acute cholecystitis or biliary obstruction.        --- End of Report ---            JD TUCKER MD; Attending Radiologist  This document has been electronically signed. Jul 31 2023 10:59AM    < end of copied text >

## 2023-08-01 NOTE — PROGRESS NOTE ADULT - ASSESSMENT
63 y/o male w/ multiple comorbidities a/w missed dialysis with r/o acute nancy  HIDA: No acute acute     -No acute surgical intervention, HIDA with no acute nancy or biliary obstruction   -GI for GIB   -Care per primary team   -Discussed with attending

## 2023-08-01 NOTE — PROGRESS NOTE ADULT - SUBJECTIVE AND OBJECTIVE BOX
NP Note discussed with  Primary Attending    Patient is a 62y old  Male who presents with a chief complaint of Missed dialysis (01 Aug 2023 09:36)      INTERVAL HPI/OVERNIGHT EVENTS: no new complaints    MEDICATIONS  (STANDING):  albuterol/ipratropium for Nebulization 3 milliLiter(s) Nebulizer every 6 hours  amLODIPine   Tablet 10 milliGRAM(s) Oral daily  aspirin enteric coated 81 milliGRAM(s) Oral daily  atorvastatin 40 milliGRAM(s) Oral at bedtime  budesonide 160 MICROgram(s)/formoterol 4.5 MICROgram(s) Inhaler 2 Puff(s) Inhalation two times a day  cefepime   IVPB 1000 milliGRAM(s) IV Intermittent every 24 hours  dextrose 5%. 1000 milliLiter(s) (50 mL/Hr) IV Continuous <Continuous>  dextrose 5%. 1000 milliLiter(s) (100 mL/Hr) IV Continuous <Continuous>  dextrose 50% Injectable 25 Gram(s) IV Push once  dextrose 50% Injectable 12.5 Gram(s) IV Push once  dextrose 50% Injectable 25 Gram(s) IV Push once  epoetin beverley (PROCRIT) Injectable 95829 Unit(s) IV Push <User Schedule>  folic acid 1 milliGRAM(s) Oral daily  furosemide    Tablet 80 milliGRAM(s) Oral daily  glucagon  Injectable 1 milliGRAM(s) IntraMuscular once  hydrALAZINE 25 milliGRAM(s) Oral three times a day  insulin glargine Injectable (LANTUS) 4 Unit(s) SubCutaneous at bedtime  latanoprost 0.005% Ophthalmic Solution 1 Drop(s) Both EYES at bedtime  levothyroxine 25 MICROGram(s) Oral daily  LORazepam     Tablet 0.5 milliGRAM(s) Oral <User Schedule>  metoprolol succinate ER 50 milliGRAM(s) Oral daily  metroNIDAZOLE  IVPB 500 milliGRAM(s) IV Intermittent every 8 hours  pantoprazole  Injectable 40 milliGRAM(s) IV Push every 12 hours  sertraline 50 milliGRAM(s) Oral daily  sucralfate 1 Gram(s) Oral four times a day    MEDICATIONS  (PRN):  dextrose Oral Gel 15 Gram(s) Oral once PRN Blood Glucose LESS THAN 70 milliGRAM(s)/deciliter  haloperidol    Injectable 0.5 milliGRAM(s) IntraMuscular every 8 hours PRN Agitation  ondansetron Injectable 4 milliGRAM(s) IV Push every 6 hours PRN Nausea and/or Vomiting      __________________________________________________  REVIEW OF SYSTEMS:    CONSTITUTIONAL: No fever,   EYES: no acute visual disturbances  NECK: No pain or stiffness  RESPIRATORY: No cough; No shortness of breath  CARDIOVASCULAR: No chest pain, no palpitations  GASTROINTESTINAL: No pain. No nausea or vomiting; No diarrhea   NEUROLOGICAL: No headache or numbness, no tremors  MUSCULOSKELETAL: No joint pain, no muscle pain  GENITOURINARY: no dysuria, no frequency, no hesitancy  PSYCHIATRY: no depression , no anxiety  ALL OTHER  ROS negative        Vital Signs Last 24 Hrs  T(C): 36.5 (01 Aug 2023 05:08), Max: 37.2 (31 Jul 2023 17:29)  T(F): 97.7 (01 Aug 2023 05:08), Max: 98.9 (31 Jul 2023 17:29)  HR: 96 (01 Aug 2023 07:30) (96 - 134)  BP: 173/85 (01 Aug 2023 07:30) (160/73 - 180/83)  BP(mean): --  RR: 20 (01 Aug 2023 05:08) (18 - 20)  SpO2: 93% (01 Aug 2023 05:08) (92% - 93%)    Parameters below as of 01 Aug 2023 05:08  Patient On (Oxygen Delivery Method): nasal cannula  O2 Flow (L/min): 4      ________________________________________________  PHYSICAL EXAM:  GENERAL: NAD  HEENT: Normocephalic;  conjunctivae and sclerae clear; moist mucous membranes;   NECK : supple  CHEST/LUNG: Clear to auscultation bilaterally with good air entry   HEART: S1 S2  regular; no murmurs, gallops or rubs  ABDOMEN: Soft, Nontender, Nondistended; Bowel sounds present  EXTREMITIES: no cyanosis; no edema; no calf tenderness  SKIN: warm and dry; no rash  NERVOUS SYSTEM:  Awake and alert; Oriented  to place, person and time ; no new deficits    _________________________________________________  LABS:                        7.5    5.57  )-----------( 100      ( 31 Jul 2023 23:40 )             24.5     07-31    139  |  100  |  88<H>  ----------------------------<  88  4.3   |  24  |  13.80<H>    Ca    8.3<L>      31 Jul 2023 09:00  Phos  6.1     07-31  Mg     3.0     07-31        Urinalysis Basic - ( 31 Jul 2023 09:00 )    Color: x / Appearance: x / SG: x / pH: x  Gluc: 88 mg/dL / Ketone: x  / Bili: x / Urobili: x   Blood: x / Protein: x / Nitrite: x   Leuk Esterase: x / RBC: x / WBC x   Sq Epi: x / Non Sq Epi: x / Bacteria: x      CAPILLARY BLOOD GLUCOSE      POCT Blood Glucose.: 106 mg/dL (01 Aug 2023 07:57)  POCT Blood Glucose.: 62 mg/dL (01 Aug 2023 06:53)  POCT Blood Glucose.: 97 mg/dL (01 Aug 2023 01:17)  POCT Blood Glucose.: 107 mg/dL (31 Jul 2023 22:54)  POCT Blood Glucose.: 120 mg/dL (31 Jul 2023 21:29)  POCT Blood Glucose.: 111 mg/dL (31 Jul 2023 17:20)  POCT Blood Glucose.: 97 mg/dL (31 Jul 2023 12:05)        RADIOLOGY & ADDITIONAL TESTS:    Imaging  Reviewed:  YES/NO    Consultant(s) Notes Reviewed:   YES/ No      Plan of care was discussed with patient and /or primary care giver; all questions and concerns were addressed  NP Note discussed with  Primary Attending    Patient is a 62y old  Male who presents with a chief complaint of Missed dialysis (01 Aug 2023 09:36)      INTERVAL HPI/OVERNIGHT EVENTS: no new complaints     MEDICATIONS  (STANDING):  albuterol/ipratropium for Nebulization 3 milliLiter(s) Nebulizer every 6 hours  amLODIPine   Tablet 10 milliGRAM(s) Oral daily  aspirin enteric coated 81 milliGRAM(s) Oral daily  atorvastatin 40 milliGRAM(s) Oral at bedtime  budesonide 160 MICROgram(s)/formoterol 4.5 MICROgram(s) Inhaler 2 Puff(s) Inhalation two times a day  cefepime   IVPB 1000 milliGRAM(s) IV Intermittent every 24 hours  dextrose 5%. 1000 milliLiter(s) (50 mL/Hr) IV Continuous <Continuous>  dextrose 5%. 1000 milliLiter(s) (100 mL/Hr) IV Continuous <Continuous>  dextrose 50% Injectable 25 Gram(s) IV Push once  dextrose 50% Injectable 12.5 Gram(s) IV Push once  dextrose 50% Injectable 25 Gram(s) IV Push once  epoetin beverley (PROCRIT) Injectable 55324 Unit(s) IV Push <User Schedule>  folic acid 1 milliGRAM(s) Oral daily  furosemide    Tablet 80 milliGRAM(s) Oral daily  glucagon  Injectable 1 milliGRAM(s) IntraMuscular once  hydrALAZINE 25 milliGRAM(s) Oral three times a day  insulin glargine Injectable (LANTUS) 4 Unit(s) SubCutaneous at bedtime  latanoprost 0.005% Ophthalmic Solution 1 Drop(s) Both EYES at bedtime  levothyroxine 25 MICROGram(s) Oral daily  LORazepam     Tablet 0.5 milliGRAM(s) Oral <User Schedule>  metoprolol succinate ER 50 milliGRAM(s) Oral daily  metroNIDAZOLE  IVPB 500 milliGRAM(s) IV Intermittent every 8 hours  pantoprazole  Injectable 40 milliGRAM(s) IV Push every 12 hours  sertraline 50 milliGRAM(s) Oral daily  sucralfate 1 Gram(s) Oral four times a day    MEDICATIONS  (PRN):  dextrose Oral Gel 15 Gram(s) Oral once PRN Blood Glucose LESS THAN 70 milliGRAM(s)/deciliter  haloperidol    Injectable 0.5 milliGRAM(s) IntraMuscular every 8 hours PRN Agitation  ondansetron Injectable 4 milliGRAM(s) IV Push every 6 hours PRN Nausea and/or Vomiting      __________________________________________________  REVIEW OF SYSTEMS:  unable to assess, patient refusing to answer questions and exam       Vital Signs Last 24 Hrs  T(C): 36.5 (01 Aug 2023 05:08), Max: 37.2 (31 Jul 2023 17:29)  T(F): 97.7 (01 Aug 2023 05:08), Max: 98.9 (31 Jul 2023 17:29)  HR: 96 (01 Aug 2023 07:30) (96 - 134)  BP: 173/85 (01 Aug 2023 07:30) (160/73 - 180/83)  BP(mean): --  RR: 20 (01 Aug 2023 05:08) (18 - 20)  SpO2: 93% (01 Aug 2023 05:08) (92% - 93%)    Parameters below as of 01 Aug 2023 05:08  Patient On (Oxygen Delivery Method): nasal cannula  O2 Flow (L/min): 4      ________________________________________________  PHYSICAL EXAM: limited, pt refusing exam   GENERAL: NAD. sleeping sidelying in bed   HEENT: Normocephalic;  conjunctivae and sclerae clear; moist mucous membranes;   ABDOMEN: Soft, Nondistended;  EXTREMITIES: no cyanosis; no edema; L BKA   SKIN: warm and dry; no rash  NERVOUS SYSTEM:  Awake; no new deficits    _________________________________________________  LABS:                        7.5    5.57  )-----------( 100      ( 31 Jul 2023 23:40 )             24.5     07-31    139  |  100  |  88<H>  ----------------------------<  88  4.3   |  24  |  13.80<H>    Ca    8.3<L>      31 Jul 2023 09:00  Phos  6.1     07-31  Mg     3.0     07-31        Urinalysis Basic - ( 31 Jul 2023 09:00 )    Color: x / Appearance: x / SG: x / pH: x  Gluc: 88 mg/dL / Ketone: x  / Bili: x / Urobili: x   Blood: x / Protein: x / Nitrite: x   Leuk Esterase: x / RBC: x / WBC x   Sq Epi: x / Non Sq Epi: x / Bacteria: x      CAPILLARY BLOOD GLUCOSE      POCT Blood Glucose.: 106 mg/dL (01 Aug 2023 07:57)  POCT Blood Glucose.: 62 mg/dL (01 Aug 2023 06:53)  POCT Blood Glucose.: 97 mg/dL (01 Aug 2023 01:17)  POCT Blood Glucose.: 107 mg/dL (31 Jul 2023 22:54)  POCT Blood Glucose.: 120 mg/dL (31 Jul 2023 21:29)  POCT Blood Glucose.: 111 mg/dL (31 Jul 2023 17:20)  POCT Blood Glucose.: 97 mg/dL (31 Jul 2023 12:05)        RADIOLOGY & ADDITIONAL TESTS:  < from: NM Hepatobiliary Imaging w/ RX (07.31.23 @ 10:44) >    ACC: [id number] EXAM:  NM HEPATOBILIARY IMG W RX   ORDERED BY: SHAKIR MAC     PROCEDURE DATE:  07/31/2023          INTERPRETATION:  CLINICAL INFORMATION: 62-year-old man with RIGHT UPPER   quadrant pain referred for evaluation of acute cholecystitis.    DURATION of DYNAMIC SERIES: 45 minutes  RADIOPHARMACEUTICAL: 3.2 mCi Tc-99m-Mebrofenin, I.V.    TECHNIQUE: Dynamic imaging of the anterior abdomen was performed   following radiopharmaceutical injection. Patient declined static images.    COMPARISON: None    OTHER STUDIES USED FOR CORRELATION: CT abdomen/pelvis 7/30/2023    FINDINGS: There is prompt, homogeneous uptake of radiopharmaceutical by   the hepatocytes. Activity is seen in the gallbladder at approximately 15   minutes. Patient declined further imaging before bowel was visualized.   There is fair clearance of activity from the liver by the end of the   obtained images..    IMPRESSION: Patient declined to complete the full exam and bowel was not   visualized on the available images. Otherwise unremarkable exam with no   evidence of acute cholecystitis or biliary obstruction.        --- End of Report ---      JD TUCKER MD; Attending Radiologist  This document has been electronically signed. Jul 31 2023 10:59AM    < end of copied text >    Imaging  Reviewed:  YES    Consultant(s) Notes Reviewed:   YES       Plan of care was discussed with patient and brother; all questions and concerns were addressed

## 2023-08-01 NOTE — PROGRESS NOTE ADULT - PROBLEM SELECTOR PLAN 2
Mom reports the patient was diagnosed with RSV Saturday. Reports vomiting 2-3 days. Reports she noticed the patient being SOB today and states his voice is raspy. Mom reports he is up to date on vaccinations. Pt 97% RA, no respiratory distress noted. Respirations even/unlabored.    echo from 2/2023 shows EF 45-50%, and grade 1 DD  *** Compared with echocardiogram report of 2/24/2023, LVEF  has improved on current study.  continue lasix 80 mg daily   continue HD

## 2023-08-02 NOTE — PROGRESS NOTE ADULT - SUBJECTIVE AND OBJECTIVE BOX
[facility] : Progress Note :: [phone], (office [phone]),   Dr Mosher / Dr Washington / Dr Young / Dr Doll / Dr Varsha TURCIOS / Dr Tello / Dr Garcia / Dr Larry qiu  _____________________________________________________________________________________________    s/p HD yesterday.    No Known Drug Allergies  fish (Rash)  liver (Anaphylaxis)    Hospital Medications:   MEDICATIONS  (STANDING):  albuterol/ipratropium for Nebulization 3 milliLiter(s) Nebulizer every 6 hours  amLODIPine   Tablet 10 milliGRAM(s) Oral daily  aspirin enteric coated 81 milliGRAM(s) Oral daily  atorvastatin 40 milliGRAM(s) Oral at bedtime  budesonide 160 MICROgram(s)/formoterol 4.5 MICROgram(s) Inhaler 2 Puff(s) Inhalation two times a day  cefepime   IVPB 1000 milliGRAM(s) IV Intermittent every 24 hours  chlorhexidine 2% Cloths 1 Application(s) Topical daily  dextrose 5%. 1000 milliLiter(s) (50 mL/Hr) IV Continuous <Continuous>  dextrose 5%. 1000 milliLiter(s) (100 mL/Hr) IV Continuous <Continuous>  dextrose 50% Injectable 12.5 milliLiter(s) IV Push once  dextrose 50% Injectable 25 Gram(s) IV Push once  dextrose 50% Injectable 25 Gram(s) IV Push once  dextrose 50% Injectable 12.5 Gram(s) IV Push once  epoetin beverley (PROCRIT) Injectable 43060 Unit(s) IV Push <User Schedule>  folic acid 1 milliGRAM(s) Oral daily  furosemide    Tablet 80 milliGRAM(s) Oral daily  glucagon  Injectable 1 milliGRAM(s) IntraMuscular once  hydrALAZINE 25 milliGRAM(s) Oral three times a day  insulin glargine Injectable (LANTUS) 4 Unit(s) SubCutaneous at bedtime  latanoprost 0.005% Ophthalmic Solution 1 Drop(s) Both EYES at bedtime  levothyroxine 25 MICROGram(s) Oral daily  LORazepam     Tablet 0.5 milliGRAM(s) Oral <User Schedule>  metoprolol succinate ER 50 milliGRAM(s) Oral daily  metroNIDAZOLE  IVPB 500 milliGRAM(s) IV Intermittent every 8 hours  pantoprazole  Injectable 40 milliGRAM(s) IV Push every 12 hours  sertraline 50 milliGRAM(s) Oral daily  sucralfate 1 Gram(s) Oral four times a day    VITALS:  T(F): 98.9 (08-02-23 @ 12:47), Max: 99.2 (08-02-23 @ 05:36)  HR: 96 (08-02-23 @ 12:47)  BP: 166/70 (08-02-23 @ 12:47)  RR: 18 (08-02-23 @ 12:47)  SpO2: 94% (08-02-23 @ 12:47)  Wt(kg): --      PHYSICAL EXAM:  Constitutional: NAD  HEENT: anicteric sclera, oropharynx clear  Neck: No JVD  Respiratory: CTAB, no wheezes, rales or rhonchi  Cardiovascular: S1, S2, RRR  Gastrointestinal: BS+, soft, NT/ND  Extremities: No peripheral edema  Neurological: A/O x 3, no focal deficits.  Vascular Access: AVF with thrill and bruit     LABS:  08-01    139  |  101  |  101<H>  ----------------------------<  86  4.4   |  23  |  16.10<H>    Ca    8.3<L>      01 Aug 2023 17:05  Phos  7.1     08-01  Mg     3.1     08-01    TPro  6.6  /  Alb  2.9<L>  /  TBili  0.8  /  DBili      /  AST  45<H>  /  ALT  42  /  AlkPhos  73  08-01    Creatinine Trend: 16.10 <--, 13.80 <--, 11.30 <--, 16.60 <--, 15.60 <--, 15.80 <--                        7.3    5.34  )-----------( 116      ( 01 Aug 2023 17:05 )             23.4     Urine Studies:  Urinalysis Basic - ( 01 Aug 2023 17:05 )    Color:  / Appearance:  / SG:  / pH:   Gluc: 86 mg/dL / Ketone:   / Bili:  / Urobili:    Blood:  / Protein:  / Nitrite:    Leuk Esterase:  / RBC:  / WBC    Sq Epi:  / Non Sq Epi:  / Bacteria:         RADIOLOGY & ADDITIONAL STUDIES:

## 2023-08-02 NOTE — PROGRESS NOTE ADULT - SUBJECTIVE AND OBJECTIVE BOX
NP Note discussed with Primary Attending.    Patient is a 62y old  Male who presents with a chief complaint of Missed dialysis (02 Aug 2023 10:17)      INTERVAL HPI/OVERNIGHT EVENTS: no new complaints    MEDICATIONS  (STANDING):  albuterol/ipratropium for Nebulization 3 milliLiter(s) Nebulizer every 6 hours  amLODIPine   Tablet 10 milliGRAM(s) Oral daily  aspirin enteric coated 81 milliGRAM(s) Oral daily  atorvastatin 40 milliGRAM(s) Oral at bedtime  budesonide 160 MICROgram(s)/formoterol 4.5 MICROgram(s) Inhaler 2 Puff(s) Inhalation two times a day  cefepime   IVPB 1000 milliGRAM(s) IV Intermittent every 24 hours  chlorhexidine 2% Cloths 1 Application(s) Topical daily  dextrose 5%. 1000 milliLiter(s) (50 mL/Hr) IV Continuous <Continuous>  dextrose 5%. 1000 milliLiter(s) (100 mL/Hr) IV Continuous <Continuous>  dextrose 50% Injectable 25 Gram(s) IV Push once  dextrose 50% Injectable 12.5 Gram(s) IV Push once  dextrose 50% Injectable 12.5 milliLiter(s) IV Push once  dextrose 50% Injectable 25 Gram(s) IV Push once  epoetin beverley (PROCRIT) Injectable 91755 Unit(s) IV Push <User Schedule>  folic acid 1 milliGRAM(s) Oral daily  furosemide    Tablet 80 milliGRAM(s) Oral daily  glucagon  Injectable 1 milliGRAM(s) IntraMuscular once  hydrALAZINE 25 milliGRAM(s) Oral three times a day  insulin glargine Injectable (LANTUS) 4 Unit(s) SubCutaneous at bedtime  latanoprost 0.005% Ophthalmic Solution 1 Drop(s) Both EYES at bedtime  levothyroxine 25 MICROGram(s) Oral daily  LORazepam     Tablet 0.5 milliGRAM(s) Oral <User Schedule>  metoprolol succinate ER 50 milliGRAM(s) Oral daily  metroNIDAZOLE  IVPB 500 milliGRAM(s) IV Intermittent every 8 hours  pantoprazole  Injectable 40 milliGRAM(s) IV Push every 12 hours  sertraline 50 milliGRAM(s) Oral daily  sucralfate 1 Gram(s) Oral four times a day    MEDICATIONS  (PRN):  dextrose Oral Gel 15 Gram(s) Oral once PRN Blood Glucose LESS THAN 70 milliGRAM(s)/deciliter  haloperidol    Injectable 0.5 milliGRAM(s) IntraMuscular every 8 hours PRN Agitation  ondansetron Injectable 4 milliGRAM(s) IV Push every 6 hours PRN Nausea and/or Vomiting      __________________________________________________  REVIEW OF SYSTEMS:    CONSTITUTIONAL: No fever,   EYES: no acute visual disturbances  NECK: No pain or stiffness  RESPIRATORY: No cough; No shortness of breath  CARDIOVASCULAR: No chest pain, no palpitations  GASTROINTESTINAL: No pain. No nausea or vomiting; No diarrhea   NEUROLOGICAL: No headache or numbness, no tremors  MUSCULOSKELETAL: Left BKA. No joint pain, no muscle pain  GENITOURINARY: ESRD on dialysis, +dysuria, no frequency, no hesitancy  PSYCHIATRY: no depression , no anxiety  ALL OTHER  ROS negative        Vital Signs Last 24 Hrs  T(C): 37.3 (02 Aug 2023 05:36), Max: 37.3 (02 Aug 2023 05:36)  T(F): 99.2 (02 Aug 2023 05:36), Max: 99.2 (02 Aug 2023 05:36)  HR: 97 (02 Aug 2023 05:36) (91 - 97)  BP: 160/67 (02 Aug 2023 05:36) (139/80 - 163/71)  BP(mean): --  RR: 18 (02 Aug 2023 05:36) (17 - 20)  SpO2: 97% (02 Aug 2023 02:17) (96% - 97%)    Parameters below as of 02 Aug 2023 05:36  Patient On (Oxygen Delivery Method): nasal cannula  O2 Flow (L/min): 2      ________________________________________________  PHYSICAL EXAM:  GENERAL: NAD  HEENT: Normocephalic;  conjunctivae and sclerae clear; moist mucous membranes;   NECK : supple  CHEST/LUNG: Clear to auscultation bilaterally with good air entry   HEART: S1 S2  regular; no murmurs, gallops or rubs  ABDOMEN: Soft, Nontender, Nondistended; Bowel sounds present  EXTREMITIES: no cyanosis; no edema; no calf tenderness  SKIN: warm and dry; no rash  NERVOUS SYSTEM:  Awake and alert; Oriented  to place, person and time ; no new deficits    _________________________________________________  LABS:                        7.3    5.34  )-----------( 116      ( 01 Aug 2023 17:05 )             23.4     08-01    139  |  101  |  101<H>  ----------------------------<  86  4.4   |  23  |  16.10<H>    Ca    8.3<L>      01 Aug 2023 17:05  Phos  7.1     08-01  Mg     3.1     08-01    TPro  6.6  /  Alb  2.9<L>  /  TBili  0.8  /  DBili  x   /  AST  45<H>  /  ALT  42  /  AlkPhos  73  08-01      Urinalysis Basic - ( 01 Aug 2023 17:05 )    Color: x / Appearance: x / SG: x / pH: x  Gluc: 86 mg/dL / Ketone: x  / Bili: x / Urobili: x   Blood: x / Protein: x / Nitrite: x   Leuk Esterase: x / RBC: x / WBC x   Sq Epi: x / Non Sq Epi: x / Bacteria: x      CAPILLARY BLOOD GLUCOSE      POCT Blood Glucose.: 75 mg/dL (02 Aug 2023 06:49)  POCT Blood Glucose.: 82 mg/dL (02 Aug 2023 02:13)  POCT Blood Glucose.: 77 mg/dL (01 Aug 2023 23:46)  POCT Blood Glucose.: 81 mg/dL (01 Aug 2023 21:43)  POCT Blood Glucose.: 79 mg/dL (01 Aug 2023 16:04)  POCT Blood Glucose.: 79 mg/dL (01 Aug 2023 12:05)        RADIOLOGY & ADDITIONAL TESTS:  ACC: [id number] EXAM:  NM HEPATOBILIARY IMG W RX   ORDERED BY: SHAKIR MAC     PROCEDURE DATE:  07/31/2023          INTERPRETATION:  CLINICAL INFORMATION: 62-year-old man with RIGHT UPPER   quadrant pain referred for evaluation of acute cholecystitis.    DURATION of DYNAMIC SERIES: 45 minutes  RADIOPHARMACEUTICAL: 3.2 mCi Tc-99m-Mebrofenin, I.V.    TECHNIQUE: Dynamic imaging of the anterior abdomen was performed   following radiopharmaceutical injection. Patient declined static images.    COMPARISON: None    OTHER STUDIES USED FOR CORRELATION: CT abdomen/pelvis 7/30/2023    FINDINGS: There is prompt, homogeneous uptake of radiopharmaceutical by   the hepatocytes. Activity is seen in the gallbladder at approximately 15   minutes. Patient declined further imaging before bowel was visualized.   There is fair clearance of activity from the liver by the end of the   obtained images..    IMPRESSION: Patient declined to complete the full exam and bowel was not   visualized on the available images. Otherwise unremarkable exam with no   evidence of acute cholecystitis or biliary obstruction.        --- End of Report ---            JD TUCKER MD; Attending Radiologist  This document has been electronically signed. Jul 31 2023 10:59AM  ACC: [id number] EXAM:  CT ABDOMEN AND PELVIS   ORDERED BY: ALEXANDER MATIAS     PROCEDURE DATE:  07/30/2023          INTERPRETATION:  CLINICAL INFORMATION: Worsening nausea and vomiting with   dark colored emesis.    COMPARISON: 7/26/2023    CONTRAST/COMPLICATIONS:  IV Contrast: NONE  Oral Contrast: NONE  Complications: None reported at time of study completion    PROCEDURE:  CT of the Abdomen and Pelvis was performed.  Sagittal and coronal reformats were performed.    FINDINGS: The examination is limited by lack of IV contrast.  LOWER CHEST: Small bilateral pleural effusions. Small bilateral   atelectasis. Nonspecific groundglass densities in the right lower lung   zone; clinical correlation with pneumonia is suggested. Cardiomegaly and   mild pericardial effusion, unchanged.    LIVER: Within normal limits.  BILE DUCTS: Dilated common bile duct again noted.  GALLBLADDER: Small gallstones. Nonspecific trace pericholecystic fluid.  SPLEEN: Within normal limits.  PANCREAS: Dilated main pancreaticduct is again noted.  ADRENALS: The right adrenal appears unremarkable. Nonspecific left   adrenal thickening.  KIDNEYS/URETERS: Within normal limits except for a few small hypodense   lesions in the kidneys bilaterally, representing probable cysts.    BLADDER: Underdistended.  REPRODUCTIVE ORGANS: The prostate and seminal vesicles appear grossly   unremarkable.    BOWEL: No bowel obstruction. Appendix unremarkable. Nonspecific mild   distal esophageal mural thickening.  PERITONEUM: Small ascites. No free air.  VESSELS: Calcified atherosclerotic disease.  RETROPERITONEUM/LYMPH NODES: No lymphadenopathy.  ABDOMINAL WALL: Anasarca again noted.  BONES: No acute CT findings.    IMPRESSION: Small gallstones. Nonspecific trace pericholecystic fluid..   If there is a clinical suspicion for acute cholecystitis, gallbladder   ultrasound/HIDA scan may be pursued for further evaluation.    Stable dilated common bile duct and main pancreatic duct. Please see   previous MRCP dated 2/23/2023.    No bowel obstruction. Appendix unremarkable. Nonspecific mild distal   esophageal mural thickening.    Small ascites.    Anasarca.    Small bilateral pleural effusions. Small bilateral atelectasis.   Nonspecific groundglass densities in the right lower lungzone; clinical   correlation with pneumonia is suggested.    Cardiomegaly and mild pericardial effusion, unchanged.    --- End of Report ---            ROSALBA GREEN MD; Attending Radiologist  This document has been electronically signed. Jul 30 2023  2:23PM    ACC: [id number] EXAM:  CT ABDOMEN AND PELVIS   ORDERED BY: JAD MATOS     ACC: [id number] EXAM:  CT CHEST   ORDERED BY: JAD MATOS     PROCEDURE DATE:  07/26/2023          INTERPRETATION:  CLINICAL INFORMATION: ESRD on hemodialysis. Fluid   overload.    COMPARISON: 2/21/2023. 12/26/2021.    CONTRAST/COMPLICATIONS:  IV Contrast: NONE  Oral Contrast: NONE  Complications: None reported at time of study completion    PROCEDURE:  CT of the Chest, Abdomen and Pelvis was performed.  Sagittal and coronal reformats were performed.    FINDINGS:  CHEST:  LUNGS AND LARGE AIRWAYS: Patent central airways. Mild interlobular septal   thickening and groundglass opacities. Atelectasis in the anterior left   lower lobe and in the lingula.  PLEURA: Small right andtrace left pleural effusions.  VESSELS: Atherosclerotic changes of the aorta and coronary arteries.   Stent, possibly in the right axillary vein.  HEART: Cardiomegaly. No pericardial effusion.  MEDIASTINUM AND PADMINI: No lymphadenopathy.  CHEST WALL AND LOWER NECK: Within normal limits.    ABDOMEN AND PELVIS:  LIVER: Within normal limits.  BILE DUCTS: Mild dilatation of the biliary tree is also unchanged.  GALLBLADDER: Within normal limits.  SPLEEN: Within normal limits.  PANCREAS: A dilated proximal pancreatic duct is again noted and unchanged   since recent MRI.  ADRENALS: Within normal limits.  KIDNEYS/URETERS: Bilateral atrophic kidneys.    BLADDER: Decompressed and could not be evaluated.  REPRODUCTIVE ORGANS: Prostate within normal limits.    BOWEL: No bowel obstruction. Appendix is normal. Retained contrast within   the large bowel which contains moderate amount of stool but is otherwise   is normal in appearance.  PERITONEUM: No ascites.  VESSELS: Atherosclerotic changes.  RETROPERITONEUM/LYMPH NODES: No lymphadenopathy.  ABDOMINAL WALL: Anasarca.  BONES: Degenerative changes.    IMPRESSION:  Mild pulmonary edema and marked anasarca.  A dilated proximal pancreatic duct and mildly dilated biliary tree are   without significant change.      --- End of Report ---            AXEL GOMES MD; Attending Radiologist  This document has been electronically signed. Jul 26 2023  6:26PM        Imaging  Reviewed:  YES/NO    Consultant(s) Notes Reviewed:   YES/ No      Plan of care was discussed with patient and /or primary care giver; all questions and concerns were addressed

## 2023-08-02 NOTE — PROGRESS NOTE ADULT - PROBLEM SELECTOR PLAN 3
schedule T/Thurs/Sat  s/p tele as hyperkalemia is now resolved  last HD 8/1   NPO for now  Continue ativan 0.5 mg tiw with dialysis  Dr. Mosher, Nephrology, following

## 2023-08-02 NOTE — DIETITIAN INITIAL EVALUATION ADULT - PROBLEM SELECTOR PLAN 1
- Pt has a history of ESRD on dialysis TTS, missed last session last HD 7/22  - Patient volume overload on exam, 2+ edema in RLE, lungs mildly crackles,   - K: 5.3  - C/w hemodialysis per nephro, likely tomorrow  - Continued discussed and education of patient regarding importance of following up with HD session  - Admit to tele  - monitor electrolytes  - CT Chest: Pulmonary edema  - Nephrology Consulted Dr. Mosher

## 2023-08-02 NOTE — PROGRESS NOTE ADULT - SUBJECTIVE AND OBJECTIVE BOX
Time of Visit:  Patient seen and examined.     MEDICATIONS  (STANDING):  albuterol/ipratropium for Nebulization 3 milliLiter(s) Nebulizer every 6 hours  amLODIPine   Tablet 10 milliGRAM(s) Oral daily  aspirin enteric coated 81 milliGRAM(s) Oral daily  atorvastatin 40 milliGRAM(s) Oral at bedtime  budesonide 160 MICROgram(s)/formoterol 4.5 MICROgram(s) Inhaler 2 Puff(s) Inhalation two times a day  cefepime   IVPB 1000 milliGRAM(s) IV Intermittent every 24 hours  chlorhexidine 2% Cloths 1 Application(s) Topical daily  dextrose 5%. 1000 milliLiter(s) (50 mL/Hr) IV Continuous <Continuous>  dextrose 5%. 1000 milliLiter(s) (100 mL/Hr) IV Continuous <Continuous>  dextrose 50% Injectable 25 Gram(s) IV Push once  dextrose 50% Injectable 12.5 Gram(s) IV Push once  dextrose 50% Injectable 25 Gram(s) IV Push once  epoetin beverley (PROCRIT) Injectable 08642 Unit(s) IV Push <User Schedule>  folic acid 1 milliGRAM(s) Oral daily  furosemide    Tablet 80 milliGRAM(s) Oral daily  glucagon  Injectable 1 milliGRAM(s) IntraMuscular once  hydrALAZINE 25 milliGRAM(s) Oral three times a day  insulin glargine Injectable (LANTUS) 4 Unit(s) SubCutaneous at bedtime  latanoprost 0.005% Ophthalmic Solution 1 Drop(s) Both EYES at bedtime  levothyroxine 25 MICROGram(s) Oral daily  LORazepam     Tablet 0.5 milliGRAM(s) Oral <User Schedule>  metoprolol succinate ER 50 milliGRAM(s) Oral daily  metroNIDAZOLE  IVPB 500 milliGRAM(s) IV Intermittent every 8 hours  pantoprazole  Injectable 40 milliGRAM(s) IV Push every 12 hours  sertraline 50 milliGRAM(s) Oral daily  sevelamer carbonate 800 milliGRAM(s) Oral three times a day with meals  sucralfate 1 Gram(s) Oral four times a day      MEDICATIONS  (PRN):  dextrose Oral Gel 15 Gram(s) Oral once PRN Blood Glucose LESS THAN 70 milliGRAM(s)/deciliter  haloperidol    Injectable 0.5 milliGRAM(s) IntraMuscular every 8 hours PRN Agitation  ondansetron Injectable 4 milliGRAM(s) IV Push every 6 hours PRN Nausea and/or Vomiting       Medications up to date at time of exam.      PHYSICAL EXAMINATION:  Patient has no new complaints.  GENERAL: The patient is a well-developed, well-nourished, in no apparent distress.     Vital Signs Last 24 Hrs  T(C): 37.2 (02 Aug 2023 12:47), Max: 37.3 (02 Aug 2023 05:36)  T(F): 98.9 (02 Aug 2023 12:47), Max: 99.2 (02 Aug 2023 05:36)  HR: 96 (02 Aug 2023 12:47) (93 - 97)  BP: 166/70 (02 Aug 2023 12:47) (157/68 - 166/70)  BP(mean): --  RR: 18 (02 Aug 2023 12:47) (18 - 20)  SpO2: 94% (02 Aug 2023 12:47) (94% - 97%)    Parameters below as of 02 Aug 2023 12:47  Patient On (Oxygen Delivery Method): room air       (if applicable)    Chest Tube (if applicable)    HEENT: Head is normocephalic and atraumatic. Extraocular muscles are intact. Mucous membranes are moist.     NECK: Supple, no palpable adenopathy.    LUNGS: Clear to auscultation, no wheezing, rales, or rhonchi.    HEART: Regular rate and rhythm without murmur.    ABDOMEN: Soft, nontender, and nondistended.  No hepatosplenomegaly is noted.    : No painful voiding, no pelvic pain    EXTREMITIES: Without any cyanosis, clubbing, rash, lesions or edema.    NEUROLOGIC: Awake, alert, oriented, grossly intact    SKIN: Warm, dry, good turgor.      LABS:                        7.3    5.34  )-----------( 116      ( 01 Aug 2023 17:05 )             23.4     08-01    139  |  101  |  101<H>  ----------------------------<  86  4.4   |  23  |  16.10<H>    Ca    8.3<L>      01 Aug 2023 17:05  Phos  7.1     08-01  Mg     3.1     08-01    TPro  6.6  /  Alb  2.9<L>  /  TBili  0.8  /  DBili  x   /  AST  45<H>  /  ALT  42  /  AlkPhos  73  08-01      Urinalysis Basic - ( 01 Aug 2023 17:05 )    Color: x / Appearance: x / SG: x / pH: x  Gluc: 86 mg/dL / Ketone: x  / Bili: x / Urobili: x   Blood: x / Protein: x / Nitrite: x   Leuk Esterase: x / RBC: x / WBC x   Sq Epi: x / Non Sq Epi: x / Bacteria: x                  Procalcitonin, Serum: 1.80 ng/mL (08-01-23 @ 17:05)      MICROBIOLOGY: (if applicable)    RADIOLOGY & ADDITIONAL STUDIES:  EKG:   CXR:  ECHO:    IMPRESSION: 62y Male 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      Osteoarthritis      Vision loss of left eye  blind      ESRD on hemodialysis  T/Th/S      Cataract  both eyes - hx of sx done      BPH (benign prostatic hyperplasia)      UTI (urinary tract infection)  hx of      Bladder mass  hx of      H/O hematuria      Osteoporosis      Vision loss of right eye  decreased      Depression      Chronic GERD      Osteomyelitis of vertebra      CHF (congestive heart failure)      Below knee amputation status, left  2012- pt is wearing prostesis      History of right cataract extraction      History of left cataract extraction      S/P arteriovenous (AV) fistula creation  right arm brachiocephalic arteriovenous fistula on 11/08/2018      H/O hematuria  s/p bladder bx and fulguration 2/25/2020      H/O transurethral destruction of bladder lesion  2020      History of excision of mass  back mass on 03/31/2021       p/w           RECOMMENDATIONS:   Time of Visit:  Patient seen and examined.     MEDICATIONS  (STANDING):  albuterol/ipratropium for Nebulization 3 milliLiter(s) Nebulizer every 6 hours  amLODIPine   Tablet 10 milliGRAM(s) Oral daily  aspirin enteric coated 81 milliGRAM(s) Oral daily  atorvastatin 40 milliGRAM(s) Oral at bedtime  budesonide 160 MICROgram(s)/formoterol 4.5 MICROgram(s) Inhaler 2 Puff(s) Inhalation two times a day  cefepime   IVPB 1000 milliGRAM(s) IV Intermittent every 24 hours  chlorhexidine 2% Cloths 1 Application(s) Topical daily  dextrose 5%. 1000 milliLiter(s) (50 mL/Hr) IV Continuous <Continuous>  dextrose 5%. 1000 milliLiter(s) (100 mL/Hr) IV Continuous <Continuous>  dextrose 50% Injectable 25 Gram(s) IV Push once  dextrose 50% Injectable 12.5 Gram(s) IV Push once  dextrose 50% Injectable 25 Gram(s) IV Push once  epoetin beverley (PROCRIT) Injectable 89909 Unit(s) IV Push <User Schedule>  folic acid 1 milliGRAM(s) Oral daily  furosemide    Tablet 80 milliGRAM(s) Oral daily  glucagon  Injectable 1 milliGRAM(s) IntraMuscular once  hydrALAZINE 25 milliGRAM(s) Oral three times a day  insulin glargine Injectable (LANTUS) 4 Unit(s) SubCutaneous at bedtime  latanoprost 0.005% Ophthalmic Solution 1 Drop(s) Both EYES at bedtime  levothyroxine 25 MICROGram(s) Oral daily  LORazepam     Tablet 0.5 milliGRAM(s) Oral <User Schedule>  metoprolol succinate ER 50 milliGRAM(s) Oral daily  metroNIDAZOLE  IVPB 500 milliGRAM(s) IV Intermittent every 8 hours  pantoprazole  Injectable 40 milliGRAM(s) IV Push every 12 hours  sertraline 50 milliGRAM(s) Oral daily  sevelamer carbonate 800 milliGRAM(s) Oral three times a day with meals  sucralfate 1 Gram(s) Oral four times a day      MEDICATIONS  (PRN):  dextrose Oral Gel 15 Gram(s) Oral once PRN Blood Glucose LESS THAN 70 milliGRAM(s)/deciliter  haloperidol    Injectable 0.5 milliGRAM(s) IntraMuscular every 8 hours PRN Agitation  ondansetron Injectable 4 milliGRAM(s) IV Push every 6 hours PRN Nausea and/or Vomiting       Medications up to date at time of exam.      PHYSICAL EXAMINATION:  Patient has no new complaints.  GENERAL: The patient is a well-developed, well-nourished, in no apparent distress.     Vital Signs Last 24 Hrs  T(C): 37.2 (02 Aug 2023 12:47), Max: 37.3 (02 Aug 2023 05:36)  T(F): 98.9 (02 Aug 2023 12:47), Max: 99.2 (02 Aug 2023 05:36)  HR: 96 (02 Aug 2023 12:47) (93 - 97)  BP: 166/70 (02 Aug 2023 12:47) (157/68 - 166/70)  BP(mean): --  RR: 18 (02 Aug 2023 12:47) (18 - 20)  SpO2: 94% (02 Aug 2023 12:47) (94% - 97%)    Parameters below as of 02 Aug 2023 12:47  Patient On (Oxygen Delivery Method): room air       (if applicable)    Chest Tube (if applicable)    HEENT: Head is normocephalic and atraumatic. Extraocular muscles are intact. Mucous membranes are moist.     NECK: Supple, no palpable adenopathy.    LUNGS: Clear to auscultation, no wheezing, rales, or rhonchi.    HEART: Regular rate and rhythm without murmur.    ABDOMEN: Soft, nontender, and nondistended.  No hepatosplenomegaly is noted.    : No painful voiding, no pelvic pain    EXTREMITIES: Without any cyanosis, clubbing, rash, lesions or edema.    NEUROLOGIC: Awake, alert, oriented, grossly intact    SKIN: Warm, dry, good turgor.      LABS:                        7.3    5.34  )-----------( 116      ( 01 Aug 2023 17:05 )             23.4     08-01    139  |  101  |  101<H>  ----------------------------<  86  4.4   |  23  |  16.10<H>    Ca    8.3<L>      01 Aug 2023 17:05  Phos  7.1     08-01  Mg     3.1     08-01    TPro  6.6  /  Alb  2.9<L>  /  TBili  0.8  /  DBili  x   /  AST  45<H>  /  ALT  42  /  AlkPhos  73  08-01      Urinalysis Basic - ( 01 Aug 2023 17:05 )    Color: x / Appearance: x / SG: x / pH: x  Gluc: 86 mg/dL / Ketone: x  / Bili: x / Urobili: x   Blood: x / Protein: x / Nitrite: x   Leuk Esterase: x / RBC: x / WBC x   Sq Epi: x / Non Sq Epi: x / Bacteria: x                  Procalcitonin, Serum: 1.80 ng/mL (08-01-23 @ 17:05)      MICROBIOLOGY: (if applicable)    RADIOLOGY & ADDITIONAL STUDIES:  EKG:   CXR:  ECHO:    IMPRESSION: 62y Male 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      Osteoarthritis      Vision loss of left eye  blind      ESRD on hemodialysis  T/Th/S      Cataract  both eyes - hx of sx done      BPH (benign prostatic hyperplasia)      UTI (urinary tract infection)  hx of      Bladder mass  hx of      H/O hematuria      Osteoporosis      Vision loss of right eye  decreased      Depression      Chronic GERD      Osteomyelitis of vertebra      CHF (congestive heart failure)      Below knee amputation status, left  2012- pt is wearing prostesis      History of right cataract extraction      History of left cataract extraction      S/P arteriovenous (AV) fistula creation  right arm brachiocephalic arteriovenous fistula on 11/08/2018      H/O hematuria  s/p bladder bx and fulguration 2/25/2020      H/O transurethral destruction of bladder lesion  2020      History of excision of mass  back mass on 03/31/2021       p/w           Impression: This is a 61 Y/O Male from [facility] with ESRD on HD ( T-Th-Sat ). Current smoker . Presented to ED due to Missed Dialysis, last HD 07-22-23 . Per Patient stated that he often missed HD sessions due to Mild left leg pain and right leg swelling . S/p RRT for SOB with Hypoxia due to Acute Hypoxic Respiratory Failure secondary to Pulmonary edema/ Fluid overload due to Missed Dialysis . CT Abdomen with Small Bilateral Pleural Effusions, Rt Lower Lung with groundglass opacity. No bowel obstruction. Small Gall Stone.   Vomiting due to diabetic gastroparesis vs acute cholecystitis  unlikely SBO      Suggestions:  O2 saturation 92% with O2 supplement. Continue Oxygen supplementation 4L NC. Monitor O2 saturation trend.   Reinforced to the patient the importance of 3x aweek compliance to Dialysis .  Dialysis per Nephro.   Reinforced  the importance of smoking cessation.  Continue Duoneb via nebulization Q 6 Hours.  Continue Symbicort 160-4.5 mcg 2 Puffs Twice daily.  No need to drain Bilateral Pleural Effusions at this time. On lasix 80 mg Oral Daily.   On Cefepime 1 Gm IVPB Daily.   Aspiration precautions with HOB elevation.        Surgery following

## 2023-08-02 NOTE — DIETITIAN INITIAL EVALUATION ADULT - FACTORS AFF FOOD INTAKE
acute on chronic comorbidities including AHRF,  acute delirium, CHF, ESRD on HD; altered GI function of gastroparesis, GI bleeding, Cholelithiasis/Yarsanism/ethnic/cultural/personal food preferences

## 2023-08-02 NOTE — PROGRESS NOTE ADULT - ASSESSMENT
62 year old male from [facility], walks with RW, with PMH of ESRD on HD (TTS), BKA- L w/prosthetic leg, DM, Left eye blindness, CHF, CAD, HTN, HLD, osteoporosis, bronchitis, current smoker, and anemia who presents with complaint of missed dialysis and does not make any urine. Patient states he was having shortness of breath. Patient admitted to telemetry for Acute Hyperkalemia 2/2 missed dialysis found to have pulmonary edema and BNP [id number]. Cardiology and nephro following.     Additionally pt had coffee ground emesis and abdominal pain, repeat CT A/P was negative for small bowel obstruction. But noted for cholelithiasis and trace pericholecystic fluid. Surgery following, concern for possible acute cholecystitis, HIDA scan was negative for acute cholecystitis. Additionally GI was consulted for possible endoscopy due to recent anemia and coffee ground emesis, no additional intervention at this time.     Hospital course complicated by Acute Hypoxic Respiratory Failure 2/2 worsening CHF. Patient was a rapid response due to hypoxia of 70-80% on room air, now requiring 4L oxygen via nasal cannula. Repeat CXR showing worsening pulmonary edema. Pt also found to have small bilateral pleural effusions, bilateral atelectasis and possible aspiration pneumonia, patient was started on cefepime and flagyl, ID Dr. Newby was consulted.    Patient for dialysis today 8/1, continues to have intermittent nausea/vomiting. Brother, Georgi King updated and aware of plan of care.     Patient is refusing blood work and meds again, HIDA negative for cholecystitis, cardiology consult noted, CHF likely 2/2 fluid overload after missing dialysis op.

## 2023-08-02 NOTE — PROGRESS NOTE ADULT - ASSESSMENT
# ESRD.  grossly fluid overloaded. had HD yesterday .   continue  TTS schedule   Encouraged to stay for the full treatment time   on lasix 80mg unclear if makes much urine. can D/C   # anemia of CKD. epogen on HD. transfuse for HBG <7  # Renal osteodystrophy .sevelamer ordered.  # HTN. cont current medications . UF at HD

## 2023-08-02 NOTE — PROGRESS NOTE ADULT - PROBLEM SELECTOR PLAN 2
echo from 2/2023 shows EF 45-50%, and grade 1 DD  *** Compared with echocardiogram report of 2/24/2023, LVEF  has improved on current study.  continue lasix 80 mg daily   likely due to missing dialysis outpatient  continue HD  cardiology dr. Still

## 2023-08-02 NOTE — DIETITIAN INITIAL EVALUATION ADULT - NSFNSGIIOFT_GEN_A_CORE
Adjusted IBW for left YBO=915 lb  Wts in [place] EMR reviewed, a bit fluctuated, may due to scale/fluid variance, HD Tx: 126.1 lb 10/4/22; 117.9 lb 3/24/23; 135.5 lb 5/27/23; 146.6 lb 7/11/23; 158 lb ? 7/26/23; 124.1 lb 8/1/23

## 2023-08-02 NOTE — PROGRESS NOTE ADULT - PROBLEM SELECTOR PLAN 5
In the setting of ESRD vs possible gastric ulcer  s/p 1 unit prbc  hgb  7.3  Continue Epogen with dialysis  Continue Iron tabs  Continue protonix 40 mg bid  Continue sucralfate 1g qid  transfuse if hgb <7  maintain type and screen every 3 days  GI following, no endoscopy at this time

## 2023-08-02 NOTE — DIETITIAN INITIAL EVALUATION ADULT - OTHER INFO
Pt from assisted living facility, ESRD on HD, Left BKA, left eye blindness, recent admission with RD Assessment 5/29/23 noted; s/p RRT 7/30/23; Alert, uncooperative, refusing labs/HD at times, s/p Psychiatric consult,  on the case; Asleep when visited today, s/p vomiting episodes overnight; NPO x 3-4d, Surgery/GI consulted; Unknown food allergies per Chart

## 2023-08-02 NOTE — DIETITIAN INITIAL EVALUATION ADULT - PROBLEM SELECTOR PLAN 2
- Pt has a history of HTN, takes Hydralazine 25mg TID, metoprolol succinate 50mg, Norvasc 10mg at home  - C/w hydralazine, metoprolol and Norvasc

## 2023-08-02 NOTE — DIETITIAN INITIAL EVALUATION ADULT - PERTINENT LABORATORY DATA
08-01    139  |  101  |  101<H>  ----------------------------<  86  4.4   |  23  |  16.10<H>    Ca    8.3<L>      01 Aug 2023 17:05  Phos  7.1     08-01  Mg     3.1     08-01    TPro  6.6  /  Alb  2.9<L>  /  TBili  0.8  /  DBili  x   /  AST  45<H>  /  ALT  42  /  AlkPhos  73  08-01  POCT Blood Glucose.: 75 mg/dL (08-02-23 @ 06:49)  A1C with Estimated Average Glucose Result: 6.1 % (07-10-23 @ 15:25)  A1C with Estimated Average Glucose Result: 5.7 % (03-16-23 @ 10:30)  A1C with Estimated Average Glucose Result: 5.0 % (01-10-23 @ 05:35)

## 2023-08-02 NOTE — PROGRESS NOTE ADULT - SUBJECTIVE AND OBJECTIVE BOX
DATE OF SERVICE: 08-02-23    Still not fully complaint but had HD yesterday    albuterol/ipratropium for Nebulization 3 milliLiter(s) Nebulizer every 6 hours  amLODIPine   Tablet 10 milliGRAM(s) Oral daily  aspirin enteric coated 81 milliGRAM(s) Oral daily  atorvastatin 40 milliGRAM(s) Oral at bedtime  budesonide 160 MICROgram(s)/formoterol 4.5 MICROgram(s) Inhaler 2 Puff(s) Inhalation two times a day  cefepime   IVPB 1000 milliGRAM(s) IV Intermittent every 24 hours  chlorhexidine 2% Cloths 1 Application(s) Topical daily  dextrose 5%. 1000 milliLiter(s) IV Continuous <Continuous>  dextrose 5%. 1000 milliLiter(s) IV Continuous <Continuous>  dextrose 50% Injectable 25 Gram(s) IV Push once  dextrose 50% Injectable 12.5 Gram(s) IV Push once  dextrose 50% Injectable 12.5 milliLiter(s) IV Push once  dextrose 50% Injectable 25 Gram(s) IV Push once  dextrose Oral Gel 15 Gram(s) Oral once PRN  epoetin beverley (PROCRIT) Injectable 41366 Unit(s) IV Push <User Schedule>  folic acid 1 milliGRAM(s) Oral daily  furosemide    Tablet 80 milliGRAM(s) Oral daily  glucagon  Injectable 1 milliGRAM(s) IntraMuscular once  haloperidol    Injectable 0.5 milliGRAM(s) IntraMuscular every 8 hours PRN  hydrALAZINE 25 milliGRAM(s) Oral three times a day  insulin glargine Injectable (LANTUS) 4 Unit(s) SubCutaneous at bedtime  latanoprost 0.005% Ophthalmic Solution 1 Drop(s) Both EYES at bedtime  levothyroxine 25 MICROGram(s) Oral daily  LORazepam     Tablet 0.5 milliGRAM(s) Oral <User Schedule>  metoprolol succinate ER 50 milliGRAM(s) Oral daily  metroNIDAZOLE  IVPB 500 milliGRAM(s) IV Intermittent every 8 hours  ondansetron Injectable 4 milliGRAM(s) IV Push every 6 hours PRN  pantoprazole  Injectable 40 milliGRAM(s) IV Push every 12 hours  sertraline 50 milliGRAM(s) Oral daily  sucralfate 1 Gram(s) Oral four times a day                            7.3    5.34  )-----------( 116      ( 01 Aug 2023 17:05 )             23.4       Hemoglobin: 7.3 g/dL (08-01 @ 17:05)  Hemoglobin: 7.5 g/dL (07-31 @ 23:40)  Hemoglobin: 7.8 g/dL (07-31 @ 09:00)  Hemoglobin: 8.0 g/dL (07-30 @ 03:07)  Hemoglobin: 6.5 g/dL (07-29 @ 11:12)      08-01    139  |  101  |  101<H>  ----------------------------<  86  4.4   |  23  |  16.10<H>    Ca    8.3<L>      01 Aug 2023 17:05  Phos  7.1     08-01  Mg     3.1     08-01    TPro  6.6  /  Alb  2.9<L>  /  TBili  0.8  /  DBili  x   /  AST  45<H>  /  ALT  42  /  AlkPhos  73  08-01    Creatinine Trend: 16.10<--, 13.80<--, 11.30<--, 16.60<--, 15.60<--, 15.80<--    COAGS:           T(C): 37.3 (08-02-23 @ 05:36), Max: 37.3 (08-02-23 @ 05:36)  HR: 97 (08-02-23 @ 05:36) (91 - 97)  BP: 160/67 (08-02-23 @ 05:36) (139/80 - 163/71)  RR: 18 (08-02-23 @ 05:36) (17 - 20)  SpO2: 97% (08-02-23 @ 02:17) (96% - 97%)  Wt(kg): --    I&O's Summary          HEENT:  (-)icterus (-)pallor  CV: N S1 S2 1/6 DIANA (+)2 Pulses B/l  Resp:  Clear to ausculatation B/L, normal effort  GI: (+) BS Soft, NT, ND  Lymph:  (-)Edema, (-)obvious lymphadenopathy  Skin: Warm to touch, Normal turgor  Psych: Appropriate mood and affect         ASSESSMENT/PLAN: 	62y Male  Mate[facility] Murray, walks with RW, with PMH of ESRD on HD (TTS), BKA- L w/prosthetic leg, DM, Left eye blindness, CHF,, HTN, HLD, EF 45-50%, normal perfusion 4/23 osteoporosis, bronchitis, current smoker, and anemia who presents with complaint of missed dialysis.     # CHF  - Due to missed HD  - HD per renal  -    # Positive trop  - nothing to suggest ACS.  His trop has been higher in the past and demonstrated normal perfusion on Stress 4/23  - ECHO noted, normal LV fx severe pulm HTN, cont to keep net negative     # Noncompliance  - Behavioral health f/u    # Smoking  - Cessation stressed    # HTN  - Suspect BP will improve once euvolemic     # N/V  - Surgery f/u    Dominic Still MD, [facility]  BEEPER [phone]

## 2023-08-02 NOTE — DIETITIAN INITIAL EVALUATION ADULT - PERTINENT MEDS FT
MEDICATIONS  (STANDING):  albuterol/ipratropium for Nebulization 3 milliLiter(s) Nebulizer every 6 hours  amLODIPine   Tablet 10 milliGRAM(s) Oral daily  aspirin enteric coated 81 milliGRAM(s) Oral daily  atorvastatin 40 milliGRAM(s) Oral at bedtime  budesonide 160 MICROgram(s)/formoterol 4.5 MICROgram(s) Inhaler 2 Puff(s) Inhalation two times a day  cefepime   IVPB 1000 milliGRAM(s) IV Intermittent every 24 hours  chlorhexidine 2% Cloths 1 Application(s) Topical daily  dextrose 5%. 1000 milliLiter(s) (50 mL/Hr) IV Continuous <Continuous>  dextrose 5%. 1000 milliLiter(s) (100 mL/Hr) IV Continuous <Continuous>  dextrose 50% Injectable 25 Gram(s) IV Push once  dextrose 50% Injectable 12.5 Gram(s) IV Push once  dextrose 50% Injectable 25 Gram(s) IV Push once  dextrose 50% Injectable 12.5 milliLiter(s) IV Push once  epoetin beverley (PROCRIT) Injectable 75819 Unit(s) IV Push <User Schedule>  folic acid 1 milliGRAM(s) Oral daily  furosemide    Tablet 80 milliGRAM(s) Oral daily  glucagon  Injectable 1 milliGRAM(s) IntraMuscular once  hydrALAZINE 25 milliGRAM(s) Oral three times a day  insulin glargine Injectable (LANTUS) 4 Unit(s) SubCutaneous at bedtime  latanoprost 0.005% Ophthalmic Solution 1 Drop(s) Both EYES at bedtime  levothyroxine 25 MICROGram(s) Oral daily  LORazepam     Tablet 0.5 milliGRAM(s) Oral <User Schedule>  metoprolol succinate ER 50 milliGRAM(s) Oral daily  metroNIDAZOLE  IVPB 500 milliGRAM(s) IV Intermittent every 8 hours  pantoprazole  Injectable 40 milliGRAM(s) IV Push every 12 hours  sertraline 50 milliGRAM(s) Oral daily  sucralfate 1 Gram(s) Oral four times a day    MEDICATIONS  (PRN):  dextrose Oral Gel 15 Gram(s) Oral once PRN Blood Glucose LESS THAN 70 milliGRAM(s)/deciliter  haloperidol    Injectable 0.5 milliGRAM(s) IntraMuscular every 8 hours PRN Agitation  ondansetron Injectable 4 milliGRAM(s) IV Push every 6 hours PRN Nausea and/or Vomiting

## 2023-08-02 NOTE — DIETITIAN INITIAL EVALUATION ADULT - ETIOLOGY
altered GI function of gastroparesis, GI bleeding, Cholelithiasis; increased nutrition needs with catabolic Tx

## 2023-08-02 NOTE — CHART NOTE - NSCHARTNOTEFT_GEN_A_CORE
EVENT:   Brother Fernando Laureano [phone]    BRIEF HPI:      OBJECTIVE:  Vital Signs Last 24 Hrs  T(C): 37.2 (02 Aug 2023 12:47), Max: 37.3 (02 Aug 2023 05:36)  T(F): 98.9 (02 Aug 2023 12:47), Max: 99.2 (02 Aug 2023 05:36)  HR: 96 (02 Aug 2023 12:47) (93 - 97)  BP: 166/70 (02 Aug 2023 12:47) (157/68 - 166/70)  BP(mean): --  RR: 18 (02 Aug 2023 12:47) (18 - 20)  SpO2: 94% (02 Aug 2023 12:47) (94% - 97%)    Parameters below as of 02 Aug 2023 12:47  Patient On (Oxygen Delivery Method): room air        FOCUSED PHYSICAL EXAM:    LABS:                        7.3    5.34  )-----------( 116      ( 01 Aug 2023 17:05 )             23.4     08-01    139  |  101  |  101<H>  ----------------------------<  86  4.4   |  23  |  16.10<H>    Ca    8.3<L>      01 Aug 2023 17:05  Phos  7.1     08-01  Mg     3.1     08-01    TPro  6.6  /  Alb  2.9<L>  /  TBili  0.8  /  DBili  x   /  AST  45<H>  /  ALT  42  /  AlkPhos  73  08-01      EKG:   IMGAGING:    ASSESSMENT:  HPI:  Patient is 62 year old male from [facility], walks with RW, with PMH of ESRD on HD (TTS), BKA- L w/prosthetic leg, DM, Left eye blindness, CHF, CAD, HTN, HLD, osteoporosis, bronchitis, current smoker, and anemia who presents with complaint of missed dialysis. Last HD 7/22. Pt states he often missed HD sessions.  patient states he missed this HD session due to mild pain in left leg, patient remains able to ambulate. Pt complains of right leg swelling and states he does not make any urine. Patient states he was having mild shortness of breath.  Pt denies any fever, chills, N/V/D, constipation, CP, numbness or tingling (26 Jul 2023 19:20)      PLAN:     FOLLOW UP / RESULT: EVENT: Pt climbing OOB into floor, not responding to instructions to be assisted back to bed. Brother Fernando Laureano [phone] contacted to speak to patient. Pt instructed on plan of care.    BRIEF HPI: 62 year old male from [facility], walks with RW, with PMH of ESRD on HD (TTS), BKA- L w/prosthetic leg, DM, Left eye blindness, CHF, CAD, HTN, HDL, osteoporosis, bronchitis, current smoker, and anemia who presents with complaint of missed dialysis and does not make any urine. Patient states he was having shortness of breath. Patient admitted to telemetry for Acute Hyperkalemia 2/2 missed dialysis found to have pulmonary edema and BNP [id number]. Cardiology and nephro following. S/p HD 8/1.  HIDA negative for cholecystitis, cardiology consult noted, CHF likely 2/2 fluid overload after missing dialysis op. Admitted for Acute respiratory failure with hypoxia probably in setting of pulmonary edema vs aspiration pneumonia vs COPD    OBJECTIVE:  Vital Signs Last 24 Hrs  T(C): 37.2 (02 Aug 2023 12:47), Max: 37.3 (02 Aug 2023 05:36)  T(F): 98.9 (02 Aug 2023 12:47), Max: 99.2 (02 Aug 2023 05:36)  HR: 96 (02 Aug 2023 12:47) (93 - 97)  BP: 166/70 (02 Aug 2023 12:47) (157/68 - 166/70)  BP(mean): --  RR: 18 (02 Aug 2023 12:47) (18 - 20)  SpO2: 94% (02 Aug 2023 12:47) (94% - 97%)    Parameters below as of 02 Aug 2023 12:47  Patient On (Oxygen Delivery Method): room air    FOCUSED PHYSICAL EXAM:  NEURO: Alert, disgruntled, oriented to name   RESP: Even, unlabored, lung sounds diminished  CV: S1 S2    LABS:                        7.3    5.34  )-----------( 116      ( 01 Aug 2023 17:05 )             23.4     08-01    139  |  101  |  101<H>  ----------------------------<  86  4.4   |  23  |  16.10<H>    Ca    8.3<L>      01 Aug 2023 17:05  Phos  7.1     08-01  Mg     3.1     08-01    TPro  6.6  /  Alb  2.9<L>  /  TBili  0.8  /  DBili  x   /  AST  45<H>  /  ALT  42  /  AlkPhos  73  08-01    PLAN:   1. Enhanced observation for patient safety  2. Cont ordered meds    FOLLOW UP / RESULT: AM labs

## 2023-08-02 NOTE — CHART NOTE - NSCHARTNOTEFT_GEN_A_CORE
Assessment:     Factors impacting intake: [ ] none [ ] nausea  [ ] vomiting [ ] diarrhea [ ] constipation  [ ]chewing problems [ ] swallowing issues  [ ] other:     Diet Presciption: Diet, NPO:   Except Medications  With Ice Chips/Sips of Water (23 @ 18:50)    Intake:     Daily Weight in k.3 (01 Aug 2023 19:03)  Weight in k (01 Aug 2023 17:03)  Weight in k.1 (2023 15:00)    % Weight Change    Pertinent Medications: MEDICATIONS  (STANDING):  albuterol/ipratropium for Nebulization 3 milliLiter(s) Nebulizer every 6 hours  amLODIPine   Tablet 10 milliGRAM(s) Oral daily  aspirin enteric coated 81 milliGRAM(s) Oral daily  atorvastatin 40 milliGRAM(s) Oral at bedtime  budesonide 160 MICROgram(s)/formoterol 4.5 MICROgram(s) Inhaler 2 Puff(s) Inhalation two times a day  cefepime   IVPB 1000 milliGRAM(s) IV Intermittent every 24 hours  chlorhexidine 2% Cloths 1 Application(s) Topical daily  dextrose 5%. 1000 milliLiter(s) (50 mL/Hr) IV Continuous <Continuous>  dextrose 5%. 1000 milliLiter(s) (100 mL/Hr) IV Continuous <Continuous>  dextrose 50% Injectable 25 Gram(s) IV Push once  dextrose 50% Injectable 12.5 Gram(s) IV Push once  dextrose 50% Injectable 12.5 milliLiter(s) IV Push once  dextrose 50% Injectable 25 Gram(s) IV Push once  epoetin beverley (PROCRIT) Injectable 14783 Unit(s) IV Push <User Schedule>  folic acid 1 milliGRAM(s) Oral daily  furosemide    Tablet 80 milliGRAM(s) Oral daily  glucagon  Injectable 1 milliGRAM(s) IntraMuscular once  hydrALAZINE 25 milliGRAM(s) Oral three times a day  insulin glargine Injectable (LANTUS) 4 Unit(s) SubCutaneous at bedtime  latanoprost 0.005% Ophthalmic Solution 1 Drop(s) Both EYES at bedtime  levothyroxine 25 MICROGram(s) Oral daily  LORazepam     Tablet 0.5 milliGRAM(s) Oral <User Schedule>  metoprolol succinate ER 50 milliGRAM(s) Oral daily  metroNIDAZOLE  IVPB 500 milliGRAM(s) IV Intermittent every 8 hours  pantoprazole  Injectable 40 milliGRAM(s) IV Push every 12 hours  sertraline 50 milliGRAM(s) Oral daily  sucralfate 1 Gram(s) Oral four times a day    MEDICATIONS  (PRN):  dextrose Oral Gel 15 Gram(s) Oral once PRN Blood Glucose LESS THAN 70 milliGRAM(s)/deciliter  haloperidol    Injectable 0.5 milliGRAM(s) IntraMuscular every 8 hours PRN Agitation  ondansetron Injectable 4 milliGRAM(s) IV Push every 6 hours PRN Nausea and/or Vomiting    Pertinent Labs:  Na139 mmol/L Glu 86 mg/dL K+ 4.4 mmol/L Cr  16.10 mg/dL<H>  mg/dL<H>  Phos 7.1 mg/dL<H>  Alb 2.9 g/dL<L>     CAPILLARY BLOOD GLUCOSE      POCT Blood Glucose.: 75 mg/dL (02 Aug 2023 06:49)  POCT Blood Glucose.: 82 mg/dL (02 Aug 2023 02:13)  POCT Blood Glucose.: 77 mg/dL (01 Aug 2023 23:46)  POCT Blood Glucose.: 81 mg/dL (01 Aug 2023 21:43)  POCT Blood Glucose.: 79 mg/dL (01 Aug 2023 16:04)  POCT Blood Glucose.: 79 mg/dL (01 Aug 2023 12:05)      Skin:     Estimated Needs:   [ ] no change since previous assessment  [ ] recalculated:     Previous Nutrition Diagnosis:   [ ] Inadequate Energy Intake [ ]Inadequate Oral Intake [ ] Excessive Energy Intake   [ ] Underweight [ ] Increased Nutrient Needs [ ] Overweight/Obesity  [ ] Swallowing Difficult   [ ] Altered GI Function [ ] Unintended Weight Loss [ ] Food & Nutrition Related Knowledge Deficit [ ] Malnutrition   [ ] Not Ready for Diet/Life Style Changes     Nutrition Diagnosis is [ ] ongoing  [ ] Improving   [ ] resolved [ ] not applicable     New Nutrition Diagnosis: [ ] not applicable       Interventions:   Recommend  [ ] Change Diet To:  [ ] Nutrition Supplement  [ ] Nutrition Support  [ ] Other:     Monitoring and Evaluation:   [ ] PO intake [ x ] Tolerance to diet prescription [ x ] weights [ x ] labs[ x ] follow up per protocol  [ ] other:

## 2023-08-02 NOTE — PROGRESS NOTE ADULT - PROBLEM SELECTOR PLAN 1
possible in setting of pulmonary edema vs aspiration pneumonia vs COPD  CT A/P noted for Right sided pna  repeat CXR showing worsening CHF  started on cefepime and flagyl   continue 4L oxygen via nasal cannula  c/w duonebs q6h for now  c/w symbicort 160/4.5 mcg 2 puffs bid  maintain oxygen sat. >92%  Nephrology following, last HD 8/1  Pulm. Dr. Loredo consulted, apprec recs  ID Dr. Newby consulted, apprec recs

## 2023-08-03 NOTE — BH CONSULTATION LIAISON PROGRESS NOTE - NSBHASSESSMENTFT_PSY_ALL_CORE
63 y/o M with a hx of HTN, DM2 with L eye blindness, CAD, CHF, HLD, L BKA with prosthesis, and ESRD on H/D, who is admitted due to a CHF exacerbation. He is followed for depression and delirium. There is a likely underlying depression, and as he gets limited H/D and his uremia continues, a delirium might also persist. Nonetheless, it is evident that a more profound existential angst is present as well. He appears to be struggling between a wish to discontinue all care vs an instinctual desire to continue to receive care, while rejecting it at critical junctures. He has clear death wishes, consistent with this angst, but is not suicidal. He is not homicidal or psychotic.    -Consider increasing Zoloft to 200 mg PO daily  -PRN: Haldol 0.5 mg IM/IV q 8 hrs PRN for agitation  -Agree with Pall Care involvement  -Try to limit polypharmacy  -Monitor his QTc  -No need for an inpatient psychiatric admission or 1:1  -Case discussed with the primary team  -Will follow as needed  
63 y/o M with a hx of HTN, DM2 with L eye blindness, CAD, CHF, HLD, L BKA with prosthesis, and ESRD on H/D, who is admitted due to a CHF exacerbation. He is followed for depression. Patient still with a probable hypoactive delirium in the setting of uremia. An underlying clinical depression appears likely as well. He is not suicidal, homicidal or psychotic, although endorses intermittent death wishes.    -Cont Zoloft 50 mg PO daily, can optimize to 100 mg within 1-2 weeks  -PRN: Haldol 0.5 mg IM/IV q 8 hrs PRN for agitation  -Monitor his QTc  -No need for an inpatient psychiatric admission or 1:1  -Case discussed with the primary team  -Will follow as needed

## 2023-08-03 NOTE — BH CONSULTATION LIAISON PROGRESS NOTE - NSBHATTESTBILLING_PSY_A_CORE
[id number] OBS or IP - high complexity OR 50-79 mins
[id number] OBS or IP - high complexity OR 50-79 mins

## 2023-08-03 NOTE — BH CONSULTATION LIAISON PROGRESS NOTE - CURRENT MEDICATION
MEDICATIONS  (STANDING):  albuterol/ipratropium for Nebulization 3 milliLiter(s) Nebulizer every 6 hours  amLODIPine   Tablet 10 milliGRAM(s) Oral daily  aspirin enteric coated 81 milliGRAM(s) Oral daily  atorvastatin 40 milliGRAM(s) Oral at bedtime  budesonide 160 MICROgram(s)/formoterol 4.5 MICROgram(s) Inhaler 2 Puff(s) Inhalation two times a day  cefepime   IVPB 1000 milliGRAM(s) IV Intermittent every 24 hours  chlorhexidine 2% Cloths 1 Application(s) Topical daily  dextrose 5%. 1000 milliLiter(s) (50 mL/Hr) IV Continuous <Continuous>  dextrose 5%. 1000 milliLiter(s) (100 mL/Hr) IV Continuous <Continuous>  dextrose 50% Injectable 12.5 Gram(s) IV Push once  dextrose 50% Injectable 25 Gram(s) IV Push once  dextrose 50% Injectable 25 Gram(s) IV Push once  epoetin beverley (PROCRIT) Injectable 75767 Unit(s) IV Push <User Schedule>  folic acid 1 milliGRAM(s) Oral daily  furosemide    Tablet 80 milliGRAM(s) Oral daily  glucagon  Injectable 1 milliGRAM(s) IntraMuscular once  hydrALAZINE 25 milliGRAM(s) Oral three times a day  insulin glargine Injectable (LANTUS) 4 Unit(s) SubCutaneous at bedtime  latanoprost 0.005% Ophthalmic Solution 1 Drop(s) Both EYES at bedtime  levothyroxine 25 MICROGram(s) Oral daily  LORazepam     Tablet 0.5 milliGRAM(s) Oral <User Schedule>  metoprolol succinate ER 50 milliGRAM(s) Oral daily  metroNIDAZOLE  IVPB 500 milliGRAM(s) IV Intermittent every 8 hours  pantoprazole  Injectable 40 milliGRAM(s) IV Push every 12 hours  sertraline 50 milliGRAM(s) Oral daily  sevelamer carbonate 800 milliGRAM(s) Oral three times a day with meals  sucralfate 1 Gram(s) Oral four times a day    MEDICATIONS  (PRN):  dextrose Oral Gel 15 Gram(s) Oral once PRN Blood Glucose LESS THAN 70 milliGRAM(s)/deciliter  haloperidol    Injectable 0.5 milliGRAM(s) IntraMuscular every 8 hours PRN Agitation  ondansetron Injectable 4 milliGRAM(s) IV Push every 6 hours PRN Nausea and/or Vomiting  
MEDICATIONS  (STANDING):  albuterol/ipratropium for Nebulization 3 milliLiter(s) Nebulizer every 6 hours  amLODIPine   Tablet 10 milliGRAM(s) Oral daily  aspirin  chewable 81 milliGRAM(s) Oral daily  atorvastatin 40 milliGRAM(s) Oral at bedtime  budesonide 160 MICROgram(s)/formoterol 4.5 MICROgram(s) Inhaler 2 Puff(s) Inhalation two times a day  dextrose 5%. 1000 milliLiter(s) (50 mL/Hr) IV Continuous <Continuous>  dextrose 5%. 1000 milliLiter(s) (100 mL/Hr) IV Continuous <Continuous>  dextrose 50% Injectable 25 Gram(s) IV Push once  dextrose 50% Injectable 25 Gram(s) IV Push once  dextrose 50% Injectable 12.5 Gram(s) IV Push once  epoetin beverley (PROCRIT) Injectable 41958 Unit(s) IV Push <User Schedule>  folic acid 1 milliGRAM(s) Oral daily  furosemide    Tablet 80 milliGRAM(s) Oral daily  glucagon  Injectable 1 milliGRAM(s) IntraMuscular once  heparin   Injectable 5000 Unit(s) SubCutaneous every 12 hours  hydrALAZINE 25 milliGRAM(s) Oral three times a day  insulin glargine Injectable (LANTUS) 4 Unit(s) SubCutaneous at bedtime  latanoprost 0.005% Ophthalmic Solution 1 Drop(s) Both EYES at bedtime  levothyroxine 25 MICROGram(s) Oral daily  LORazepam     Tablet 0.5 milliGRAM(s) Oral <User Schedule>  metoclopramide 10 milliGRAM(s) Oral three times a day  metolazone 10 milliGRAM(s) Oral daily  metoprolol succinate ER 50 milliGRAM(s) Oral daily  oxycodone    5 mG/acetaminophen 325 mG 1 Tablet(s) Oral every 6 hours  pantoprazole    Tablet 40 milliGRAM(s) Oral before breakfast  sertraline 50 milliGRAM(s) Oral daily  sevelamer carbonate 800 milliGRAM(s) Oral three times a day  sucralfate 1 Gram(s) Oral four times a day    MEDICATIONS  (PRN):  dextrose Oral Gel 15 Gram(s) Oral once PRN Blood Glucose LESS THAN 70 milliGRAM(s)/deciliter  haloperidol    Injectable 0.5 milliGRAM(s) IntraMuscular every 8 hours PRN Agitation  ondansetron   Disintegrating Tablet 4 milliGRAM(s) Oral every 6 hours PRN Nausea and/or Vomiting

## 2023-08-03 NOTE — PROGRESS NOTE ADULT - SUBJECTIVE AND OBJECTIVE BOX
Time of Visit:  Patient seen and examined.     MEDICATIONS  (STANDING):  albuterol/ipratropium for Nebulization 3 milliLiter(s) Nebulizer every 6 hours  amLODIPine   Tablet 10 milliGRAM(s) Oral daily  aspirin enteric coated 81 milliGRAM(s) Oral daily  atorvastatin 40 milliGRAM(s) Oral at bedtime  budesonide 160 MICROgram(s)/formoterol 4.5 MICROgram(s) Inhaler 2 Puff(s) Inhalation two times a day  cefepime   IVPB 1000 milliGRAM(s) IV Intermittent every 24 hours  chlorhexidine 2% Cloths 1 Application(s) Topical daily  dextrose 5%. 1000 milliLiter(s) (50 mL/Hr) IV Continuous <Continuous>  dextrose 5%. 1000 milliLiter(s) (100 mL/Hr) IV Continuous <Continuous>  dextrose 50% Injectable 25 Gram(s) IV Push once  dextrose 50% Injectable 12.5 Gram(s) IV Push once  dextrose 50% Injectable 25 Gram(s) IV Push once  epoetin beverley (PROCRIT) Injectable 48433 Unit(s) IV Push <User Schedule>  folic acid 1 milliGRAM(s) Oral daily  furosemide    Tablet 80 milliGRAM(s) Oral daily  glucagon  Injectable 1 milliGRAM(s) IntraMuscular once  hydrALAZINE 25 milliGRAM(s) Oral three times a day  insulin glargine Injectable (LANTUS) 4 Unit(s) SubCutaneous at bedtime  latanoprost 0.005% Ophthalmic Solution 1 Drop(s) Both EYES at bedtime  levothyroxine 25 MICROGram(s) Oral daily  LORazepam     Tablet 0.5 milliGRAM(s) Oral <User Schedule>  metoprolol succinate ER 50 milliGRAM(s) Oral daily  metroNIDAZOLE  IVPB 500 milliGRAM(s) IV Intermittent every 8 hours  pantoprazole  Injectable 40 milliGRAM(s) IV Push every 12 hours  sertraline 200 milliGRAM(s) Oral daily  sevelamer carbonate 800 milliGRAM(s) Oral three times a day with meals  sucralfate 1 Gram(s) Oral four times a day      MEDICATIONS  (PRN):  dextrose Oral Gel 15 Gram(s) Oral once PRN Blood Glucose LESS THAN 70 milliGRAM(s)/deciliter  haloperidol    Injectable 0.5 milliGRAM(s) IntraMuscular every 8 hours PRN Agitation  ondansetron Injectable 4 milliGRAM(s) IV Push every 6 hours PRN Nausea and/or Vomiting       Medications up to date at time of exam.      PHYSICAL EXAMINATION:  Patient has no new complaints.  GENERAL: The patient is a well-developed, well-nourished, in no apparent distress.     Vital Signs Last 24 Hrs  T(C): 36.6 (03 Aug 2023 13:30), Max: 36.9 (02 Aug 2023 21:24)  T(F): 97.8 (03 Aug 2023 13:30), Max: 98.4 (02 Aug 2023 21:24)  HR: 98 (03 Aug 2023 15:39) (97 - 102)  BP: 169/74 (03 Aug 2023 15:39) (157/71 - 172/77)  BP(mean): --  RR: 18 (03 Aug 2023 15:39) (18 - 18)  SpO2: 96% (03 Aug 2023 15:39) (92% - 96%)    Parameters below as of 03 Aug 2023 15:39  Patient On (Oxygen Delivery Method): nasal cannula  O2 Flow (L/min): 4     (if applicable)    Chest Tube (if applicable)    HEENT: Head is normocephalic and atraumatic. Extraocular muscles are intact. Mucous membranes are moist.     NECK: Supple, no palpable adenopathy.    LUNGS: Clear to auscultation, no wheezing, rales, or rhonchi.    HEART: Regular rate and rhythm without murmur.    ABDOMEN: Soft, nontender,    : No painful voiding, no pelvic pain    EXTREMITIES: Left BKA    NEUROLOGIC: Awake,     SKIN: Warm, dry, good turgor.      LABS:                          Procalcitonin, Serum: 1.80 ng/mL (08-01-23 @ 17:05)      MICROBIOLOGY: (if applicable)    RADIOLOGY & ADDITIONAL STUDIES:  EKG:   CXR:  ECHO:    IMPRESSION: 62y Male 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      Osteoarthritis      Vision loss of left eye  blind      ESRD on hemodialysis  T/Th/S      Cataract  both eyes - hx of sx done      BPH (benign prostatic hyperplasia)      UTI (urinary tract infection)  hx of      Bladder mass  hx of      H/O hematuria      Osteoporosis      Vision loss of right eye  decreased      Depression      Chronic GERD      Osteomyelitis of vertebra      CHF (congestive heart failure)      Below knee amputation status, left  2012- pt is wearing prostesis      History of right cataract extraction      History of left cataract extraction      S/P arteriovenous (AV) fistula creation  right arm brachiocephalic arteriovenous fistula on 11/08/2018      H/O hematuria  s/p bladder bx and fulguration 2/25/2020      H/O transurethral destruction of bladder lesion  2020      History of excision of mass  back mass on 03/31/2021       p/w           Impression: This is a 63 Y/O Male from [facility] with ESRD on HD ( T-Th-Sat ). Current smoker . Presented to ED due to Missed Dialysis, last HD 07-22-23 . Per Patient stated that he often missed HD sessions due to Mild left leg pain and right leg swelling . S/p RRT for SOB with Hypoxia due to Acute Hypoxic Respiratory Failure secondary to Pulmonary edema/ Fluid overload due to Missed Dialysis . CT Abdomen with Small Bilateral Pleural Effusions, Rt Lower Lung with groundglass opacity. No bowel obstruction. Small Gall Stone.   Vomiting due to diabetic gastroparesis vs acute cholecystitis  unlikely SBO . Pat refused dialysis and blood work today      Suggestions:  - O2 supp as needed to maintain sat >90%  - Psy eval noted    - Advise pat to accept dialysis treatment   - Duoneb via nebulization Q 6 Hours.  - Asp precaution   - Consider Reglan 10 mg iv q6h to increase GI motility   - Zofran for n/v  - On Cefepime 1 Gm IVPB Daily.

## 2023-08-03 NOTE — PROGRESS NOTE ADULT - SUBJECTIVE AND OBJECTIVE BOX
NP Note discussed with Primary Attending        Patient is a 62y old  Male who presents with a chief complaint of Missed dialysis (03 Aug 2023 09:51)      INTERVAL HPI/OVERNIGHT EVENTS: no new complaints    MEDICATIONS  (STANDING):  albuterol/ipratropium for Nebulization 3 milliLiter(s) Nebulizer every 6 hours  amLODIPine   Tablet 10 milliGRAM(s) Oral daily  aspirin enteric coated 81 milliGRAM(s) Oral daily  atorvastatin 40 milliGRAM(s) Oral at bedtime  budesonide 160 MICROgram(s)/formoterol 4.5 MICROgram(s) Inhaler 2 Puff(s) Inhalation two times a day  cefepime   IVPB 1000 milliGRAM(s) IV Intermittent every 24 hours  chlorhexidine 2% Cloths 1 Application(s) Topical daily  dextrose 5%. 1000 milliLiter(s) (50 mL/Hr) IV Continuous <Continuous>  dextrose 5%. 1000 milliLiter(s) (100 mL/Hr) IV Continuous <Continuous>  dextrose 50% Injectable 12.5 Gram(s) IV Push once  dextrose 50% Injectable 25 Gram(s) IV Push once  dextrose 50% Injectable 25 Gram(s) IV Push once  epoetin beverley (PROCRIT) Injectable 98319 Unit(s) IV Push <User Schedule>  folic acid 1 milliGRAM(s) Oral daily  furosemide    Tablet 80 milliGRAM(s) Oral daily  glucagon  Injectable 1 milliGRAM(s) IntraMuscular once  hydrALAZINE 25 milliGRAM(s) Oral three times a day  insulin glargine Injectable (LANTUS) 4 Unit(s) SubCutaneous at bedtime  latanoprost 0.005% Ophthalmic Solution 1 Drop(s) Both EYES at bedtime  levothyroxine 25 MICROGram(s) Oral daily  LORazepam     Tablet 0.5 milliGRAM(s) Oral <User Schedule>  metoprolol succinate ER 50 milliGRAM(s) Oral daily  metroNIDAZOLE  IVPB 500 milliGRAM(s) IV Intermittent every 8 hours  pantoprazole  Injectable 40 milliGRAM(s) IV Push every 12 hours  sertraline 50 milliGRAM(s) Oral daily  sevelamer carbonate 800 milliGRAM(s) Oral three times a day with meals  sucralfate 1 Gram(s) Oral four times a day    MEDICATIONS  (PRN):  dextrose Oral Gel 15 Gram(s) Oral once PRN Blood Glucose LESS THAN 70 milliGRAM(s)/deciliter  haloperidol    Injectable 0.5 milliGRAM(s) IntraMuscular every 8 hours PRN Agitation  ondansetron Injectable 4 milliGRAM(s) IV Push every 6 hours PRN Nausea and/or Vomiting      __________________________________________________  REVIEW OF SYSTEMS:    CONSTITUTIONAL: No fever,   EYES: no acute visual disturbances  NECK: No pain or stiffness  RESPIRATORY: No cough; No shortness of breath  CARDIOVASCULAR: No chest pain, no palpitations  GASTROINTESTINAL: No pain. No nausea or vomiting; No diarrhea   NEUROLOGICAL: No headache or numbness, no tremors  MUSCULOSKELETAL: BKA left, No joint pain, no muscle pain  GENITOURINARY: no dysuria, no frequency, no hesitancy  PSYCHIATRY: no depression , no anxiety  ALL OTHER  ROS negative        Vital Signs Last 24 Hrs  T(C): 36.9 (03 Aug 2023 05:07), Max: 37.2 (02 Aug 2023 12:47)  T(F): 98.4 (03 Aug 2023 05:07), Max: 98.9 (02 Aug 2023 12:47)  HR: 97 (03 Aug 2023 08:59) (96 - 102)  BP: 168/71 (03 Aug 2023 08:59) (164/73 - 172/77)  BP(mean): --  RR: 18 (03 Aug 2023 05:07) (18 - 18)  SpO2: 92% (02 Aug 2023 21:24) (92% - 94%)    Parameters below as of 02 Aug 2023 21:24  Patient On (Oxygen Delivery Method): room air  O2 Flow (L/min): 2      ________________________________________________  PHYSICAL EXAM:  GENERAL: NAD  HEENT: Normocephalic;  conjunctivae and sclerae clear; moist mucous membranes;   NECK : supple  CHEST/LUNG: Clear to auscultation bilaterally with good air entry   HEART: S1 S2  regular; no murmurs, gallops or rubs  ABDOMEN: Soft, Nontender, Nondistended; Bowel sounds present  EXTREMITIES: no cyanosis; no edema; no calf tenderness  SKIN: warm and dry; no rash  NERVOUS SYSTEM:  Awake and alert; Oriented  to place, person and time ; no new deficits    _________________________________________________  LABS:                        7.3    5.34  )-----------( 116      ( 01 Aug 2023 17:05 )             23.4     08-01    139  |  101  |  101<H>  ----------------------------<  86  4.4   |  23  |  16.10<H>    Ca    8.3<L>      01 Aug 2023 17:05  Phos  7.1     08-01  Mg     3.1     08-01    TPro  6.6  /  Alb  2.9<L>  /  TBili  0.8  /  DBili  x   /  AST  45<H>  /  ALT  42  /  AlkPhos  73  08-01      Urinalysis Basic - ( 01 Aug 2023 17:05 )    Color: x / Appearance: x / SG: x / pH: x  Gluc: 86 mg/dL / Ketone: x  / Bili: x / Urobili: x   Blood: x / Protein: x / Nitrite: x   Leuk Esterase: x / RBC: x / WBC x   Sq Epi: x / Non Sq Epi: x / Bacteria: x      CAPILLARY BLOOD GLUCOSE      POCT Blood Glucose.: 93 mg/dL (03 Aug 2023 07:38)  POCT Blood Glucose.: 86 mg/dL (02 Aug 2023 21:33)  POCT Blood Glucose.: 86 mg/dL (02 Aug 2023 16:57)  POCT Blood Glucose.: 80 mg/dL (02 Aug 2023 15:29)        RADIOLOGY & ADDITIONAL TESTS:    ACC: [id number] EXAM:  NM HEPATOBILIARY IMG W RX   ORDERED BY: SHAKIR MAC     PROCEDURE DATE:  07/31/2023          INTERPRETATION:  CLINICAL INFORMATION: 62-year-old man with RIGHT UPPER   quadrant pain referred for evaluation of acute cholecystitis.    DURATION of DYNAMIC SERIES: 45 minutes  RADIOPHARMACEUTICAL: 3.2 mCi Tc-99m-Mebrofenin, I.V.    TECHNIQUE: Dynamic imaging of the anterior abdomen was performed   following radiopharmaceutical injection. Patient declined static images.    COMPARISON: None    OTHER STUDIES USED FOR CORRELATION: CT abdomen/pelvis 7/30/2023    FINDINGS: There is prompt, homogeneous uptake of radiopharmaceutical by   the hepatocytes. Activity is seen in the gallbladder at approximately 15   minutes. Patient declined further imaging before bowel was visualized.   There is fair clearance of activity from the liver by the end of the   obtained images..    IMPRESSION: Patient declined to complete the full exam and bowel was not   visualized on the available images. Otherwise unremarkable exam with no   evidence of acute cholecystitis or biliary obstruction.        --- End of Report ---            JD TUCKER MD; Attending Radiologist  This document has been electronically signed. Jul 31 2023 10:59AM    ACC: [id number] EXAM:  CT ABDOMEN AND PELVIS   ORDERED BY: ALEXANDER MATIAS     PROCEDURE DATE:  07/30/2023          INTERPRETATION:  CLINICAL INFORMATION: Worsening nausea and vomiting with   dark colored emesis.    COMPARISON: 7/26/2023    CONTRAST/COMPLICATIONS:  IV Contrast: NONE  Oral Contrast: NONE  Complications: None reported at time of study completion    PROCEDURE:  CT of the Abdomen and Pelvis was performed.  Sagittal and coronal reformats were performed.    FINDINGS: The examination is limited by lack of IV contrast.  LOWER CHEST: Small bilateral pleural effusions. Small bilateral   atelectasis. Nonspecific groundglass densities in the right lower lung   zone; clinical correlation with pneumonia is suggested. Cardiomegaly and   mild pericardial effusion, unchanged.    LIVER: Within normal limits.  BILE DUCTS: Dilated common bile duct again noted.  GALLBLADDER: Small gallstones. Nonspecific trace pericholecystic fluid.  SPLEEN: Within normal limits.  PANCREAS: Dilated main pancreaticduct is again noted.  ADRENALS: The right adrenal appears unremarkable. Nonspecific left   adrenal thickening.  KIDNEYS/URETERS: Within normal limits except for a few small hypodense   lesions in the kidneys bilaterally, representing probable cysts.    BLADDER: Underdistended.  REPRODUCTIVE ORGANS: The prostate and seminal vesicles appear grossly   unremarkable.    BOWEL: No bowel obstruction. Appendix unremarkable. Nonspecific mild   distal esophageal mural thickening.  PERITONEUM: Small ascites. No free air.  VESSELS: Calcified atherosclerotic disease.  RETROPERITONEUM/LYMPH NODES: No lymphadenopathy.  ABDOMINAL WALL: Anasarca again noted.  BONES: No acute CT findings.    IMPRESSION: Small gallstones. Nonspecific trace pericholecystic fluid..   If there is a clinical suspicion for acute cholecystitis, gallbladder   ultrasound/HIDA scan may be pursued for further evaluation.    Stable dilated common bile duct and main pancreatic duct. Please see   previous MRCP dated 2/23/2023.    No bowel obstruction. Appendix unremarkable. Nonspecific mild distal   esophageal mural thickening.    Small ascites.    Anasarca.    Small bilateral pleural effusions. Small bilateral atelectasis.   Nonspecific groundglass densities in the right lower lungzone; clinical   correlation with pneumonia is suggested.    Cardiomegaly and mild pericardial effusion, unchanged.    --- End of Report ---            ROSALBA GREEN MD; Attending Radiologist  This document has been electronically signed. Jul 30 2023  2:23PM      Imaging  Reviewed:  YES/NO    Consultant(s) Notes Reviewed:   YES/ No      Plan of care was discussed with patient and /or primary care giver; all questions and concerns were addressed

## 2023-08-03 NOTE — PROGRESS NOTE ADULT - PROBLEM SELECTOR PLAN 1
possible in setting of pulmonary edema vs aspiration pneumonia vs COPD  CT A/P noted for Right sided pna  repeat CXR showing worsening CHF  started on cefepime and flagyl   continue 4L oxygen via nasal cannula  c/w duonebs q6h for now  c/w symbicort 160/4.5 mcg 2 puffs bid  maintain oxygen sat. >92%  Nephrology following, last HD 8/1  plan for dialysis today  Pulm. Dr. Loredo consulted, apprec recs  ID Dr. Newby consulted, apprec recs

## 2023-08-03 NOTE — PROGRESS NOTE ADULT - SUBJECTIVE AND OBJECTIVE BOX
DATE OF SERVICE: 08-03-23    Patient denies chest pain or shortness of breath. remains not complaint with meds and blood draw s  Review of symptoms otherwise negative.    albuterol/ipratropium for Nebulization 3 milliLiter(s) Nebulizer every 6 hours  amLODIPine   Tablet 10 milliGRAM(s) Oral daily  aspirin enteric coated 81 milliGRAM(s) Oral daily  atorvastatin 40 milliGRAM(s) Oral at bedtime  budesonide 160 MICROgram(s)/formoterol 4.5 MICROgram(s) Inhaler 2 Puff(s) Inhalation two times a day  cefepime   IVPB 1000 milliGRAM(s) IV Intermittent every 24 hours  chlorhexidine 2% Cloths 1 Application(s) Topical daily  dextrose 5%. 1000 milliLiter(s) IV Continuous <Continuous>  dextrose 5%. 1000 milliLiter(s) IV Continuous <Continuous>  dextrose 50% Injectable 12.5 Gram(s) IV Push once  dextrose 50% Injectable 25 Gram(s) IV Push once  dextrose 50% Injectable 25 Gram(s) IV Push once  dextrose Oral Gel 15 Gram(s) Oral once PRN  epoetin beverley (PROCRIT) Injectable 59796 Unit(s) IV Push <User Schedule>  folic acid 1 milliGRAM(s) Oral daily  furosemide    Tablet 80 milliGRAM(s) Oral daily  glucagon  Injectable 1 milliGRAM(s) IntraMuscular once  haloperidol    Injectable 0.5 milliGRAM(s) IntraMuscular every 8 hours PRN  hydrALAZINE 25 milliGRAM(s) Oral three times a day  insulin glargine Injectable (LANTUS) 4 Unit(s) SubCutaneous at bedtime  latanoprost 0.005% Ophthalmic Solution 1 Drop(s) Both EYES at bedtime  levothyroxine 25 MICROGram(s) Oral daily  LORazepam     Tablet 0.5 milliGRAM(s) Oral <User Schedule>  metoprolol succinate ER 50 milliGRAM(s) Oral daily  metroNIDAZOLE  IVPB 500 milliGRAM(s) IV Intermittent every 8 hours  ondansetron Injectable 4 milliGRAM(s) IV Push every 6 hours PRN  pantoprazole  Injectable 40 milliGRAM(s) IV Push every 12 hours  sertraline 50 milliGRAM(s) Oral daily  sevelamer carbonate 800 milliGRAM(s) Oral three times a day with meals  sucralfate 1 Gram(s) Oral four times a day                            7.3    5.34  )-----------( 116      ( 01 Aug 2023 17:05 )             23.4       Hemoglobin: 7.3 g/dL (08-01 @ 17:05)  Hemoglobin: 7.5 g/dL (07-31 @ 23:40)  Hemoglobin: 7.8 g/dL (07-31 @ 09:00)  Hemoglobin: 8.0 g/dL (07-30 @ 03:07)      08-01    139  |  101  |  101<H>  ----------------------------<  86  4.4   |  23  |  16.10<H>    Ca    8.3<L>      01 Aug 2023 17:05  Phos  7.1     08-01  Mg     3.1     08-01    TPro  6.6  /  Alb  2.9<L>  /  TBili  0.8  /  DBili  x   /  AST  45<H>  /  ALT  42  /  AlkPhos  73  08-01    Creatinine Trend: 16.10<--, 13.80<--, 11.30<--, 16.60<--, 15.60<--, 15.80<--    COAGS:           T(C): 36.9 (08-03-23 @ 05:07), Max: 37.2 (08-02-23 @ 12:47)  HR: 97 (08-03-23 @ 08:59) (96 - 102)  BP: 168/71 (08-03-23 @ 08:59) (164/73 - 172/77)  RR: 18 (08-03-23 @ 05:07) (18 - 18)  SpO2: 92% (08-02-23 @ 21:24) (92% - 94%)  Wt(kg): --    I&O's Summary          HEENT:  (-)icterus (-)pallor  CV: N S1 S2 1/6 DIANA (+)2 Pulses B/l  Resp:  Clear to ausculatation B/L, normal effort  GI: (+) BS Soft, NT, ND  Lymph:  (-)Edema, (-)obvious lymphadenopathy  Skin: Warm to touch, Normal turgor  Psych: Appropriate mood and affect         ASSESSMENT/PLAN: 	62y Male  Mateus Murray, walks with RW, with PMH of ESRD on HD (TTS), BKA- L w/prosthetic leg, DM, Left eye blindness, CHF,, HTN, HLD, EF 45-50%, normal perfusion 4/23 osteoporosis, bronchitis, current smoker, and anemia who presents with complaint of missed dialysis.     # CHF  - Due to missed HD  - HD per renal  -    # Positive trop  - nothing to suggest ACS.  His trop has been higher in the past and demonstrated normal perfusion on Stress 4/23  - ECHO noted, normal LV fx severe pulm HTN, cont to keep net negative     # Noncompliance  - Behavioral health f/u  - refusing meds and blood tests     # Smoking  - Cessation stressed    # HTN  - Suspect BP will improve once euvolemic     # N/V  - Surgery f/u    Dominic Still MD, [facility]  BEEPER [phone]

## 2023-08-03 NOTE — BH CONSULTATION LIAISON PROGRESS NOTE - NSICDXBHSECONDARYDX_PSY_ALL_CORE
ESRD on dialysis   N18.6  HTN (hypertension)   I10  DM (diabetes mellitus)   E11.9  HLD (hyperlipidemia)   E78.5  Anemia due to end stage renal disease   N18.6  Prophylactic measure   Z29.9  At risk for depression   Z91.89  Hyperphosphatemia   E83.39  Adjustment reaction to medical therapy   F43.20  Debility   R53.81  Moderate protein-calorie malnutrition   E44.0  Encounter for palliative care   Z51.5  
ESRD on dialysis   N18.6  HTN (hypertension)   I10  DM (diabetes mellitus)   E11.9  Prophylactic measure   Z29.9  At risk for depression   Z91.89  Hyperphosphatemia   E83.39  Adjustment reaction to medical therapy   F43.20  Debility   R53.81  Moderate protein-calorie malnutrition   E44.0  Encounter for palliative care   Z51.5  Acute respiratory failure with hypoxia   J96.01  Gastroparesis   K31.84  CAD (coronary artery disease)   I25.10  Acute delirium   R41.0  Acute on chronic anemia   D64.9  Cholelithiasis   K80.20  Acute on chronic combined systolic and diastolic congestive heart failure   I50.43

## 2023-08-03 NOTE — PROGRESS NOTE ADULT - ASSESSMENT
62 year old male from [facility], walks with RW, with PMH of ESRD on HD (TTS), BKA- L w/prosthetic leg, DM, Left eye blindness, CHF, CAD, HTN, HLD, osteoporosis, bronchitis, current smoker, and anemia who presents with complaint of missed dialysis and does not make any urine. Patient states he was having shortness of breath. Patient admitted to telemetry for Acute Hyperkalemia 2/2 missed dialysis found to have pulmonary edema and BNP [id number]. Cardiology and nephro following.     Additionally pt had coffee ground emesis and abdominal pain, repeat CT A/P was negative for small bowel obstruction. But noted for cholelithiasis and trace pericholecystic fluid. Surgery following, concern for possible acute cholecystitis, HIDA scan was negative for acute cholecystitis. Additionally GI was consulted for possible endoscopy due to recent anemia and coffee ground emesis, no additional intervention at this time.     Hospital course complicated by Acute Hypoxic Respiratory Failure 2/2 worsening CHF. Patient was a rapid response due to hypoxia of 70-80% on room air, now requiring 4L oxygen via nasal cannula. Repeat CXR showing worsening pulmonary edema. Pt also found to have small bilateral pleural effusions, bilateral atelectasis and possible aspiration pneumonia, patient was started on cefepime and flagyl, ID Dr. Newby was consulted.    Patient for dialysis today 8/1, continues to have intermittent nausea/vomiting. Brother, Georgi King updated and aware of plan of care.     Patient is refusing blood work and meds again, HIDA negative for cholecystitis, cardiology consult noted, CHF likely 2/2 fluid overload after missing dialysis op.    Palliative consulted, patient refused blood work again this AM, will re consult  dr Linder as patient is unstable about decision making regarding his treatment. Patient brother is updated daily about patient condition and refusals.  Patient is scheduled for dialysis today, will obtain CBC and BNP. Last night US guided IV was inserted by on chanda l ICU team, but patient removed IV at 9PM 2 hours after.

## 2023-08-03 NOTE — BH CONSULTATION LIAISON PROGRESS NOTE - NSBHFUPINTERVALHXFT_PSY_A_CORE
Patient seen and chart reviewed. As per team, patient has continued to leave H/D after just 1 hr and is refusing most IV meds. Upon interview still reports feeling depressed, but interview was complicated by restlessness, as well as N/V. He did mention that he wants to die, although denies SI. Denied HI or AVH.
Patient seen and chart reviewed. As per chart, is not tolerating full H/D sessions. Still appears lethargic and with psychomotor retardation. Was cooperative, but limited in his interaction with an interview. Reports feeling depressed. Denies any SI, HI or AVH.

## 2023-08-03 NOTE — BH CONSULTATION LIAISON PROGRESS NOTE - NSBHCHARTREVIEWVS_PSY_A_CORE FT
Vital Signs Last 24 Hrs  T(C): 36.6 (03 Aug 2023 13:30), Max: 36.9 (02 Aug 2023 21:24)  T(F): 97.8 (03 Aug 2023 13:30), Max: 98.4 (02 Aug 2023 21:24)  HR: 102 (03 Aug 2023 13:30) (97 - 102)  BP: 157/71 (03 Aug 2023 13:30) (157/71 - 172/77)  BP(mean): --  RR: 18 (03 Aug 2023 13:30) (18 - 18)  SpO2: 94% (03 Aug 2023 13:30) (92% - 94%)    Parameters below as of 03 Aug 2023 13:30  Patient On (Oxygen Delivery Method): nasal cannula  O2 Flow (L/min): 3  
Vital Signs Last 24 Hrs  T(C): 36.5 (28 Jul 2023 16:26), Max: 37.2 (27 Jul 2023 21:15)  T(F): 97.7 (28 Jul 2023 16:26), Max: 99 (27 Jul 2023 21:15)  HR: 82 (28 Jul 2023 16:26) (82 - 96)  BP: 147/76 (28 Jul 2023 16:26) (131/76 - 164/85)  BP(mean): --  RR: 20 (28 Jul 2023 16:26) (19 - 20)  SpO2: 85% (28 Jul 2023 14:43) (85% - 96%)    Parameters below as of 28 Jul 2023 14:43  Patient On (Oxygen Delivery Method): room air

## 2023-08-03 NOTE — PROGRESS NOTE ADULT - ASSESSMENT
# ESRD.  grossly fluid overloaded. scheduled for HD today.   continue  TTS schedule  psych and palliatiave  care F/U for GOC   # anemia of CKD. epogen on HD. transfuse for HBG <7  # Renal osteodystrophy .sevelamer ordered.  # HTN. cont current medications . UF at HD

## 2023-08-03 NOTE — PROGRESS NOTE ADULT - CONVERSATION DETAILS
Met with the pt at the bedside to explore his wishes for the future, goals of care, and end of life issues. He was A/Ox3, consfused about date but he was able to state the year and that it was  or July, able to state his name and  and oriented to the situation, that he is admitted to the hospital in [place] due to missing HD sessions. He verbalized awareness of his prognosis and consequences of missing HD and refusing treatments (IV access, lab draws), shared that he is aware that he will die if he does not go to HD. Shared that he has been thinking about this.     Explored existential issues that recently he has been declining necessary treatments like HD, or only participating minimally. Asked him if he wanted to stop HD and he said he doesn't know. We discussed that there are many ways to express our intentions, and that it seems that he isn't able to verbally express his wishes to stop HD but his pattern of behavior lately demonstrates his wishes to stop treatment. Evidenced by his inconsistent participation with HD. He verbalized agreement with these statements. He stated he is aware he is dying. Explored end of life issues and asked him if there was anyone he felt he needed to talk to and any matters he felt he needed to resolve, he expressed wishes to meet with his brother, and we arranged a family meeting for tomorrow. He also would want to speak with his fiance on the phone and possibly have her visit, will reach out to [facility] to facilitate this with his fiance Tammie Beckham who is a resident there.    Offered chaplaincy and assessed spiritual needs and he stated he does not practice a Restoration. He is in agreement with plan with the family and care team including ethics to discuss his code status and EOL issues. Much emotional support provided.    Calls placed to pt's HCP Fernando to update him regarding the clinical situation and the plan moving forward. He expressed frustration with the pt's noncompliance and clinical situation. He is able to participate via phone in a meeting tomorrow with the care team. Educated regarding the existential issues outlined above and much support provided.

## 2023-08-03 NOTE — PROGRESS NOTE ADULT - SUBJECTIVE AND OBJECTIVE BOX
[facility] : Progress Note :: [phone], (office [phone]),   Dr Mosher / Dr Washington / Dr Young / Dr Doll / Dr Varsha TURCIOS / Dr Tello / Dr Garcia / Dr Larry qiu  _____________________________________________________________________________________________    events noted.  refused medical care, pulled out IV lines.  due for HD today  refused to answer questions     No Known Drug Allergies  fish (Rash)  liver (Anaphylaxis)    Hospital Medications:   MEDICATIONS  (STANDING):  albuterol/ipratropium for Nebulization 3 milliLiter(s) Nebulizer every 6 hours  amLODIPine   Tablet 10 milliGRAM(s) Oral daily  aspirin enteric coated 81 milliGRAM(s) Oral daily  atorvastatin 40 milliGRAM(s) Oral at bedtime  budesonide 160 MICROgram(s)/formoterol 4.5 MICROgram(s) Inhaler 2 Puff(s) Inhalation two times a day  cefepime   IVPB 1000 milliGRAM(s) IV Intermittent every 24 hours  chlorhexidine 2% Cloths 1 Application(s) Topical daily  dextrose 5%. 1000 milliLiter(s) (50 mL/Hr) IV Continuous <Continuous>  dextrose 5%. 1000 milliLiter(s) (100 mL/Hr) IV Continuous <Continuous>  dextrose 50% Injectable 12.5 Gram(s) IV Push once  dextrose 50% Injectable 25 Gram(s) IV Push once  dextrose 50% Injectable 25 Gram(s) IV Push once  epoetin beverley (PROCRIT) Injectable 13063 Unit(s) IV Push <User Schedule>  folic acid 1 milliGRAM(s) Oral daily  furosemide    Tablet 80 milliGRAM(s) Oral daily  glucagon  Injectable 1 milliGRAM(s) IntraMuscular once  hydrALAZINE 25 milliGRAM(s) Oral three times a day  insulin glargine Injectable (LANTUS) 4 Unit(s) SubCutaneous at bedtime  latanoprost 0.005% Ophthalmic Solution 1 Drop(s) Both EYES at bedtime  levothyroxine 25 MICROGram(s) Oral daily  LORazepam     Tablet 0.5 milliGRAM(s) Oral <User Schedule>  metoprolol succinate ER 50 milliGRAM(s) Oral daily  metroNIDAZOLE  IVPB 500 milliGRAM(s) IV Intermittent every 8 hours  pantoprazole  Injectable 40 milliGRAM(s) IV Push every 12 hours  sertraline 50 milliGRAM(s) Oral daily  sevelamer carbonate 800 milliGRAM(s) Oral three times a day with meals  sucralfate 1 Gram(s) Oral four times a day        VITALS:  T(F): 98.4 (08-03-23 @ 05:07), Max: 98.4 (08-02-23 @ 21:24)  HR: 97 (08-03-23 @ 08:59)  BP: 168/71 (08-03-23 @ 08:59)  RR: 18 (08-03-23 @ 05:07)  SpO2: 92% (08-02-23 @ 21:24)  Wt(kg): --      PHYSICAL EXAM:  Constitutional: No acute distress.  refused to participate in exam    LABS:  08-01    139  |  101  |  101<H>  ----------------------------<  86  4.4   |  23  |  16.10<H>    Ca    8.3<L>      01 Aug 2023 17:05  Phos  7.1     08-01  Mg     3.1     08-01    TPro  6.6  /  Alb  2.9<L>  /  TBili  0.8  /  DBili      /  AST  45<H>  /  ALT  42  /  AlkPhos  73  08-01    Creatinine Trend: 16.10 <--, 13.80 <--, 11.30 <--, 16.60 <--, 15.60 <--                        7.3    5.34  )-----------( 116      ( 01 Aug 2023 17:05 )             23.4     Urine Studies:  Urinalysis Basic - ( 01 Aug 2023 17:05 )    Color:  / Appearance:  / SG:  / pH:   Gluc: 86 mg/dL / Ketone:   / Bili:  / Urobili:    Blood:  / Protein:  / Nitrite:    Leuk Esterase:  / RBC:  / WBC    Sq Epi:  / Non Sq Epi:  / Bacteria:         RADIOLOGY & ADDITIONAL STUDIES:

## 2023-08-03 NOTE — PROGRESS NOTE ADULT - SUBJECTIVE AND OBJECTIVE BOX
Patient is a 62y old  Male who presents with a chief complaint of Missed dialysis (02 Aug 2023 19:26)    PATIENT IS SEEN AND EXAMINED IN MEDICAL FLOOR.      ALLERGIES:  No Known Drug Allergies  fish (Rash)  liver (Anaphylaxis)        VITALS:    Vital Signs Last 24 Hrs  T(C): 36.9 (03 Aug 2023 05:07), Max: 37.2 (02 Aug 2023 12:47)  T(F): 98.4 (03 Aug 2023 05:07), Max: 98.9 (02 Aug 2023 12:47)  HR: 97 (03 Aug 2023 08:59) (96 - 102)  BP: 168/71 (03 Aug 2023 08:59) (164/73 - 172/77)  BP(mean): --  RR: 18 (03 Aug 2023 05:07) (18 - 18)  SpO2: 92% (02 Aug 2023 21:24) (92% - 94%)    Parameters below as of 02 Aug 2023 21:24  Patient On (Oxygen Delivery Method): room air  O2 Flow (L/min): 2      LABS:    CBC Full  -  ( 01 Aug 2023 17:05 )  WBC Count : 5.34 K/uL  RBC Count : 2.73 M/uL  Hemoglobin : 7.3 g/dL  Hematocrit : 23.4 %  Platelet Count - Automated : 116 K/uL  Mean Cell Volume : 85.7 fl  Mean Cell Hemoglobin : 26.7 pg  Mean Cell Hemoglobin Concentration : 31.2 gm/dL  Auto Neutrophil # : x  Auto Lymphocyte # : x  Auto Monocyte # : x  Auto Eosinophil # : x  Auto Basophil # : x  Auto Neutrophil % : x  Auto Lymphocyte % : x  Auto Monocyte % : x  Auto Eosinophil % : x  Auto Basophil % : x      08-01    139  |  101  |  101<H>  ----------------------------<  86  4.4   |  23  |  16.10<H>    Ca    8.3<L>      01 Aug 2023 17:05  Phos  7.1     08-01  Mg     3.1     08-01    TPro  6.6  /  Alb  2.9<L>  /  TBili  0.8  /  DBili  x   /  AST  45<H>  /  ALT  42  /  AlkPhos  73  08-01    CAPILLARY BLOOD GLUCOSE      POCT Blood Glucose.: 93 mg/dL (03 Aug 2023 07:38)  POCT Blood Glucose.: 86 mg/dL (02 Aug 2023 21:33)  POCT Blood Glucose.: 86 mg/dL (02 Aug 2023 16:57)  POCT Blood Glucose.: 80 mg/dL (02 Aug 2023 15:29)        LIVER FUNCTIONS - ( 01 Aug 2023 17:05 )  Alb: 2.9 g/dL / Pro: 6.6 g/dL / ALK PHOS: 73 U/L / ALT: 42 U/L DA / AST: 45 U/L / GGT: x           Creatinine Trend: 16.10<--, 13.80<--, 11.30<--, 16.60<--, 15.60<--, 15.80<--  I&O's Summary          .Blood Blood-Peripheral  05-28 @ 14:07   No Growth Final  --  --          MEDICATIONS:    MEDICATIONS  (STANDING):  albuterol/ipratropium for Nebulization 3 milliLiter(s) Nebulizer every 6 hours  amLODIPine   Tablet 10 milliGRAM(s) Oral daily  aspirin enteric coated 81 milliGRAM(s) Oral daily  atorvastatin 40 milliGRAM(s) Oral at bedtime  budesonide 160 MICROgram(s)/formoterol 4.5 MICROgram(s) Inhaler 2 Puff(s) Inhalation two times a day  cefepime   IVPB 1000 milliGRAM(s) IV Intermittent every 24 hours  chlorhexidine 2% Cloths 1 Application(s) Topical daily  dextrose 5%. 1000 milliLiter(s) (50 mL/Hr) IV Continuous <Continuous>  dextrose 5%. 1000 milliLiter(s) (100 mL/Hr) IV Continuous <Continuous>  dextrose 50% Injectable 12.5 Gram(s) IV Push once  dextrose 50% Injectable 25 Gram(s) IV Push once  dextrose 50% Injectable 25 Gram(s) IV Push once  epoetin beverley (PROCRIT) Injectable 08584 Unit(s) IV Push <User Schedule>  folic acid 1 milliGRAM(s) Oral daily  furosemide    Tablet 80 milliGRAM(s) Oral daily  glucagon  Injectable 1 milliGRAM(s) IntraMuscular once  hydrALAZINE 25 milliGRAM(s) Oral three times a day  insulin glargine Injectable (LANTUS) 4 Unit(s) SubCutaneous at bedtime  latanoprost 0.005% Ophthalmic Solution 1 Drop(s) Both EYES at bedtime  levothyroxine 25 MICROGram(s) Oral daily  LORazepam     Tablet 0.5 milliGRAM(s) Oral <User Schedule>  metoprolol succinate ER 50 milliGRAM(s) Oral daily  metroNIDAZOLE  IVPB 500 milliGRAM(s) IV Intermittent every 8 hours  pantoprazole  Injectable 40 milliGRAM(s) IV Push every 12 hours  sertraline 50 milliGRAM(s) Oral daily  sevelamer carbonate 800 milliGRAM(s) Oral three times a day with meals  sucralfate 1 Gram(s) Oral four times a day      MEDICATIONS  (PRN):  dextrose Oral Gel 15 Gram(s) Oral once PRN Blood Glucose LESS THAN 70 milliGRAM(s)/deciliter  haloperidol    Injectable 0.5 milliGRAM(s) IntraMuscular every 8 hours PRN Agitation  ondansetron Injectable 4 milliGRAM(s) IV Push every 6 hours PRN Nausea and/or Vomiting      REVIEW OF SYSTEMS:                           ALL ROS DONE [ X   ]    CONSTITUTIONAL:  LETHARGIC [   ], FEVER [   ], UNRESPONSIVE [   ]  CVS:  CP  [   ], SOB, [   ], PALPITATIONS [   ], DIZZYNESS [   ]  RS: COUGH [   ], SPUTUM [   ]  GI: ABDOMINAL PAIN [   ], NAUSEA [   ], VOMITINGS [   ], DIARRHEA [   ], CONSTIPATION [   ]  :  DYSURIA [   ], NOCTURIA [   ], INCREASED FREQUENCY [   ], DRIBLING [   ],  SKELETAL: PAINFUL JOINTS [   ], SWOLLEN JOINTS [   ], NECK ACHE [   ], LOW BACK ACHE [   ],  SKIN : ULCERS [   ], RASH [   ], ITCHING [   ]  CNS: HEAD ACHE [   ], DOUBLE VISION [   ], BLURRED VISION [   ], AMS / CONFUSION [   ], SEIZURES [   ], WEAKNESS [   ],TINGLING / NUMBNESS [   ]    PHYSICAL EXAMINATION:  GENERAL APPEARANCE: NO DISTRESS,    ANASARCA ++  HEENT:  NO PALLOR, NO  JVD,  NO   NODES, NECK SUPPLE  CVS: S1 +, S2 +,   RS: AEEB,  OCCASIONAL  RALES +,   CRACKLES+ AT LUNG BASES  ABD: SOFT, NT, NO, BS +  EXT: LEFT BKA  SKIN: WARM,   SKELETAL:  ROM ACCEPTABLE  CNS:  AAO X  3      RADIOLOGY :      ASSESSMENT :     Hypervolemia    No pertinent past medical history    Hypertension    Adrenal insufficiency    CKD (chronic kidney disease)    Anemia    Glaucoma    Coronary artery disease    HLD (hyperlipidemia)    Peripheral vascular disease    Spinal stenosis of lumbosacral region    Hyperparathyroidism    Diabetes mellitus    Diabetic neuropathy    Contracture of hand    Osteoarthritis    Osteoporosis    Vision loss of left eye    ESRD on hemodialysis    Cataract    BPH (benign prostatic hyperplasia)    UTI (urinary tract infection)    Bladder mass    H/O hematuria    Osteoporosis    Vision loss of right eye    Depression    Chronic GERD    Osteomyelitis of vertebra    CHF (congestive heart failure)    No significant past surgical history    Below knee amputation status, left    History of right cataract extraction    History of left cataract extraction    S/P arteriovenous (AV) fistula creation    H/O hematuria    H/O transurethral destruction of bladder lesion    History of excision of mass        PLAN:  HPI:  Patient is 62 year old male from [facility], walks with RW, with PMH of ESRD on HD (TTS), BKA- L w/prosthetic leg, DM, Left eye blindness, CHF, CAD, HTN, HLD, osteoporosis, bronchitis, current smoker, and anemia who presents with complaint of missed dialysis. Last HD 7/22. Pt states he often missed HD sessions.  patient states he missed this HD session due to mild pain in left leg, patient remains able to ambulate. Pt complains of right leg swelling and states he does not make any urine. Patient states he was having mild shortness of breath.  Pt denies any fever, chills, N/V/D, constipation, CP, numbness or tingling (26 Jul 2023 19:20)    # CASE DISCUSSED AT LENGTH WITH PATIENT'S BROTHER ARTEMIO @ [phone]. DISCUSSED REGARDING CURRENT CLINICAL CONDITION, RECOMMENDED EVALUATIONS AND INTERVENTIONS. ALL QUESTIONS ANSWERED. DISCUSSED THAT PROGNOSIS IS POOR/GRIM GIVEN UNDERLYING MEDICAL CONDITIONS. ALSO DISCUSSED THAT DESPITE VERBALIZING UNDERSTANDING OF MEDICAL CONDITIONS AND RECOMMENDED INTERVENTIONS - PATIENT PERSISTENTLY REMAINS NONCOMPLIANT. TEAM HAS OFFERED PATIENT EMOTIONAL SUPPORT AND OFFERED MEDICATION FOR MOOD. [8/1] TEAM UPDATED FAMILY [8/2]    # PATIENT W/ HISTORY OF ROUTINE NONCOMPLIANCE W/ LIFE-SUSTAINING TREATMENT [HD], EVALUATIONS AND INTERVENTIONS - COUNSELLED AT LENGTH TO REMAIN COMPLIANT. D/W PATIENT THAT PROGNOSIS IS GRIM/POOR. HE VERBALIZED UNDERSTANDING. REGARDING GOC - PATIENT WISHES FOR FULL CODE. PALLIATIVE CARE CONSULTED. PSYCHIATRY CONSULTED.  - PATIENT IS ORIENTED TO PERSON, PLACE AND CIRCUMSTANCE. HE EXPRESSED THAT HE DOES GROW IMPATIENT DURING DIALYSIS SESSIONS AND HAS BEEN LEAVING SESSIONS SOONER THAN PRESCRIBED. IN DISCUSSION THAT RISKS OF DEFERRING COMPLETE HD - INCLUDE BUT ARE NOT LIMITED TO WORSENING CLINICAL CONDITION, ELECTROLYTE ABNORMALITIES, ARRHYTHMIA AND DEATH. HE VERBALIZED UNDERSTANDING. HE REFUSES TO CONSIDER A NURSING HOME WITH ONSITE HD.   - D/W PATIENT THAT REPEATEDLY REFUSING INTERVENTIONS AND ASSESSMENTS IS NOT IN LINE WITH HIS WISHES TO IMPROVE. PATIENT VERBALIZED UNDERSTANDING AND AGREEMENT. IN DISCUSSION, REGARDING POSSIBLE ETIOLOGY - PSYCHIATRY WAS CONSULTED TO DISCUSS MOOD, PATIENT DOES EXPRESS THAT HE HAS SOME DEPRESSION GIVEN HIS MEDICAL CONDITIONS. BUT HE DENIES THAT THIS IS THE ETIOLOGY FOR NONCOMPLIANCE. HE EXPLAINS THAT HE DOES NOT LIKE BEING CONFINED TO A DIALYSIS MACHINE. OFFERED FOR PATIENT TO CONSIDER NURSING HOME W/ ONSITE HD. PATIENT WISHES TO CONTINUE TO LIVE IN ASSISTED LIVING.    - [8/2] - PATIENT REFUSING HD, REMOVED IV LINE, REFUSING MEDICATION - COUNSELLED BY TEAM AND BY FAMILY - THAT RISKS OF NONCOMPLIANCE INCLUDE BUT ARE NOT LIMITED TO WORSENING CLINICAL CONDITION AND DEATH. PATIENT VERBALIZED UNDERSTANDING, HOWEVER SHORTLY THEREAFTER REMOVED IV LINE. FAMILY ALSO COUNSELLING PATIENT TO COMPLY. PROGNOSIS IS POOR. PATIENT AND FAMILY VERBALIZE UNDERSTANDING. PALLIATIVE CARE CONSULT IN PROGRESS    # ACUTE ON CHRONIC HYPOXIC RESPIRATORY FAILURE S/T PULMONARY EDEMA - S/T INCOMPLETE HD SESSIONS ; ANASARCA  # HYPERKALEMIA  # HX OF COPD + S/P RECENT MULTIFOCAL PNEUMONIA ; R/O ASPIRATION PNEUMONIA  # PULMONARY HYPERTENSION  # UNCONTROLLED HTN  # ESRD ON HD TTS - W/ HX OF NONCOMPLIANCE    - TELEMETRY  - HYDRALAZINE, METOPROLOL AND NORVASC ; PRN IV ANTIHYPERTENSIVE  - LOKELMA  - SUPPLEMENTAL O2  - PLANNED FOR HD  - NEPHROLOGY CONSULT  - PULMONOLOGY CONSULT  - ID CONSULT    - PLANNED FOR HD 7/26, 7/27 [INCOMPLETE SESSIONS], 8/1   - REFUSED HD ON 7/28, 7/31  - UNDERWENT HD 7/29 [COMPLETE SESSION]      - [7/30] - OVERNIGHT RAPID RESPONSE S/T HYPOXIA - SELF-LIMITED - PATIENT IMPROVED S/P O2 SUPPLEMENT  - EMPIRICALLY STARTED ON CEFEPIME + FLAGYLL, F/U BCX       # RECURRENT INTRACTABLE NAUSEA/VOMITING, ? COFFEE GROUND EMESIS  # GASTROPARESIS  # HISTORY OF ESOPHAGITIS AND DUODENITIS [1/2021], HX OF GASTROPARESIS W/ EVIDENCE OF THICKENED ESOPHAGUS ON PREVIOUS CT SCAN   # PANCREAS W/ DOUBLE DUCT SIGN    - MONITORING HGB, PLACED ON PPI BID , CARAFATE QID  - PRN ANTIEMETICS   - MONITORING FOR SYMPTOMS  - WHILE ON DIET PATIENT REPEATEDLY COUNSELLED TO CHEW FOOD CAUTIOUSLY AND CONSUME SMALL BITES W/ REGULAR CLEARING WITH WATER BETWEEN BITES    - TRIAL OF CLEAR LIQUID DIET  - NOTED CT A/P    - S/P RECENT PRBC TRANSFUSION     - GI CONSULT  - SURGERY CONSULT    # R/O CHOLECYSTITIS  - PLACED ON CEFEPIME, F/U BCX  - NOTED CT A/P  - HIDA SCAN - NEGATIVE  - SURGERY CONSULT    # ELEVATED TROPONINS - ? DEMAND ISCHEMIA    - S/P TELEMETRY  - TREND TROPONINS  - ECHO - TRACE MR, MODERATELY INCREASED LV WALL THICKNESS, NORMAL LV SYSTOLIC FUNCTION, SEVERE PULMONARY HTN  - CARDIOLOGY CONSULT    # GASTROPARESIS  # UNDERLYING DM  - SSI + FS  - COUNSELLED TO COMPLY WITH HD TO REDUCE UREMIA    # DEPRESSION  - PLACED ON ZOLOFT  - PSYCHIATRY CONSULT    # PATIENT UNDERGOING OUTPATIENT WORKUP FOR CENTRAL VENOUS STENOSIS THROUGH NEPHROLOGY TEAM  - ? s/p STENT PLACEMENT    # ANEMIA OF CKD  # HX OF PANCYTOPENIA   - TREND HGB, TRANSFUSION THRESHOLD HGB < 7; PATIENT STILL INTERMITTENTLY REFUSING BLOODWORK, COUNSELLED TO COMPLY  - TYPE AND SCREEN  - ON EPO  - DENIES RECENT HEMATEMESIS, MELENA, HEMATOCHEZIA    - S/P RECENT PRBC TRANSFUSION  - S/P PRBC TRANSFUSION 7/29    - PPI BID, CARAFATE QID    # SEVERE PROTEIN CALORIE MALNUTRITION, FAILURE TO THRIVE   -  TEMPORAL WASTING, LOSS OF MUSCLE MASS FROM SHOULDER AND HIP GIRDLE  - NUTRITIONAL SUPPLEMENT    # HLD  # PARTIALLY BLIND  # S/P LEFT BKA  # HX OF PVD  # LS SPINAL STENOSIS  # GI AND DVT PPX   Patient is a 62y old  Male who presents with a chief complaint of Missed dialysis (02 Aug 2023 19:26)    PATIENT IS SEEN AND EXAMINED IN MEDICAL FLOOR. PATIENT STILL WITH INTERMITTENT RECURRENT EMESIS - OCCASIONALLY MUCOID, OCCASIONALLY SMALL VOLUME COFFEE GROUND.       ALLERGIES:  No Known Drug Allergies  fish (Rash)  liver (Anaphylaxis)        VITALS:    Vital Signs Last 24 Hrs  T(C): 36.9 (03 Aug 2023 05:07), Max: 37.2 (02 Aug 2023 12:47)  T(F): 98.4 (03 Aug 2023 05:07), Max: 98.9 (02 Aug 2023 12:47)  HR: 97 (03 Aug 2023 08:59) (96 - 102)  BP: 168/71 (03 Aug 2023 08:59) (164/73 - 172/77)  BP(mean): --  RR: 18 (03 Aug 2023 05:07) (18 - 18)  SpO2: 92% (02 Aug 2023 21:24) (92% - 94%)    Parameters below as of 02 Aug 2023 21:24  Patient On (Oxygen Delivery Method): room air  O2 Flow (L/min): 2      LABS:    CBC Full  -  ( 01 Aug 2023 17:05 )  WBC Count : 5.34 K/uL  RBC Count : 2.73 M/uL  Hemoglobin : 7.3 g/dL  Hematocrit : 23.4 %  Platelet Count - Automated : 116 K/uL  Mean Cell Volume : 85.7 fl  Mean Cell Hemoglobin : 26.7 pg  Mean Cell Hemoglobin Concentration : 31.2 gm/dL  Auto Neutrophil # : x  Auto Lymphocyte # : x  Auto Monocyte # : x  Auto Eosinophil # : x  Auto Basophil # : x  Auto Neutrophil % : x  Auto Lymphocyte % : x  Auto Monocyte % : x  Auto Eosinophil % : x  Auto Basophil % : x      08-01    139  |  101  |  101<H>  ----------------------------<  86  4.4   |  23  |  16.10<H>    Ca    8.3<L>      01 Aug 2023 17:05  Phos  7.1     08-01  Mg     3.1     08-01    TPro  6.6  /  Alb  2.9<L>  /  TBili  0.8  /  DBili  x   /  AST  45<H>  /  ALT  42  /  AlkPhos  73  08-01    CAPILLARY BLOOD GLUCOSE      POCT Blood Glucose.: 93 mg/dL (03 Aug 2023 07:38)  POCT Blood Glucose.: 86 mg/dL (02 Aug 2023 21:33)  POCT Blood Glucose.: 86 mg/dL (02 Aug 2023 16:57)  POCT Blood Glucose.: 80 mg/dL (02 Aug 2023 15:29)        LIVER FUNCTIONS - ( 01 Aug 2023 17:05 )  Alb: 2.9 g/dL / Pro: 6.6 g/dL / ALK PHOS: 73 U/L / ALT: 42 U/L DA / AST: 45 U/L / GGT: x           Creatinine Trend: 16.10<--, 13.80<--, 11.30<--, 16.60<--, 15.60<--, 15.80<--  I&O's Summary          .Blood Blood-Peripheral  05-28 @ 14:07   No Growth Final  --  --          MEDICATIONS:    MEDICATIONS  (STANDING):  albuterol/ipratropium for Nebulization 3 milliLiter(s) Nebulizer every 6 hours  amLODIPine   Tablet 10 milliGRAM(s) Oral daily  aspirin enteric coated 81 milliGRAM(s) Oral daily  atorvastatin 40 milliGRAM(s) Oral at bedtime  budesonide 160 MICROgram(s)/formoterol 4.5 MICROgram(s) Inhaler 2 Puff(s) Inhalation two times a day  cefepime   IVPB 1000 milliGRAM(s) IV Intermittent every 24 hours  chlorhexidine 2% Cloths 1 Application(s) Topical daily  dextrose 5%. 1000 milliLiter(s) (50 mL/Hr) IV Continuous <Continuous>  dextrose 5%. 1000 milliLiter(s) (100 mL/Hr) IV Continuous <Continuous>  dextrose 50% Injectable 12.5 Gram(s) IV Push once  dextrose 50% Injectable 25 Gram(s) IV Push once  dextrose 50% Injectable 25 Gram(s) IV Push once  epoetin beverley (PROCRIT) Injectable 11620 Unit(s) IV Push <User Schedule>  folic acid 1 milliGRAM(s) Oral daily  furosemide    Tablet 80 milliGRAM(s) Oral daily  glucagon  Injectable 1 milliGRAM(s) IntraMuscular once  hydrALAZINE 25 milliGRAM(s) Oral three times a day  insulin glargine Injectable (LANTUS) 4 Unit(s) SubCutaneous at bedtime  latanoprost 0.005% Ophthalmic Solution 1 Drop(s) Both EYES at bedtime  levothyroxine 25 MICROGram(s) Oral daily  LORazepam     Tablet 0.5 milliGRAM(s) Oral <User Schedule>  metoprolol succinate ER 50 milliGRAM(s) Oral daily  metroNIDAZOLE  IVPB 500 milliGRAM(s) IV Intermittent every 8 hours  pantoprazole  Injectable 40 milliGRAM(s) IV Push every 12 hours  sertraline 50 milliGRAM(s) Oral daily  sevelamer carbonate 800 milliGRAM(s) Oral three times a day with meals  sucralfate 1 Gram(s) Oral four times a day      MEDICATIONS  (PRN):  dextrose Oral Gel 15 Gram(s) Oral once PRN Blood Glucose LESS THAN 70 milliGRAM(s)/deciliter  haloperidol    Injectable 0.5 milliGRAM(s) IntraMuscular every 8 hours PRN Agitation  ondansetron Injectable 4 milliGRAM(s) IV Push every 6 hours PRN Nausea and/or Vomiting      REVIEW OF SYSTEMS:                           ALL ROS DONE [ X   ]    CONSTITUTIONAL:  LETHARGIC [   ], FEVER [   ], UNRESPONSIVE [   ]  CVS:  CP  [   ], SOB, [   ], PALPITATIONS [   ], DIZZYNESS [   ]  RS: COUGH [   ], SPUTUM [   ]  GI: ABDOMINAL PAIN [   ], NAUSEA [   ], VOMITINGS [   ], DIARRHEA [   ], CONSTIPATION [   ]  :  DYSURIA [   ], NOCTURIA [   ], INCREASED FREQUENCY [   ], DRIBLING [   ],  SKELETAL: PAINFUL JOINTS [   ], SWOLLEN JOINTS [   ], NECK ACHE [   ], LOW BACK ACHE [   ],  SKIN : ULCERS [   ], RASH [   ], ITCHING [   ]  CNS: HEAD ACHE [   ], DOUBLE VISION [   ], BLURRED VISION [   ], AMS / CONFUSION [   ], SEIZURES [   ], WEAKNESS [   ],TINGLING / NUMBNESS [   ]    PHYSICAL EXAMINATION:  GENERAL APPEARANCE: NO DISTRESS,    ANASARCA ++  HEENT:  NO PALLOR, NO  JVD,  NO   NODES, NECK SUPPLE  CVS: S1 +, S2 +,   RS: AEEB,  OCCASIONAL  RALES +,   CRACKLES+ AT LUNG BASES  ABD: SOFT, NT, NO, BS +  EXT: LEFT BKA  SKIN: WARM,   SKELETAL:  ROM ACCEPTABLE  CNS:  AAO X  3      RADIOLOGY :      ASSESSMENT :     Hypervolemia    No pertinent past medical history    Hypertension    Adrenal insufficiency    CKD (chronic kidney disease)    Anemia    Glaucoma    Coronary artery disease    HLD (hyperlipidemia)    Peripheral vascular disease    Spinal stenosis of lumbosacral region    Hyperparathyroidism    Diabetes mellitus    Diabetic neuropathy    Contracture of hand    Osteoarthritis    Osteoporosis    Vision loss of left eye    ESRD on hemodialysis    Cataract    BPH (benign prostatic hyperplasia)    UTI (urinary tract infection)    Bladder mass    H/O hematuria    Osteoporosis    Vision loss of right eye    Depression    Chronic GERD    Osteomyelitis of vertebra    CHF (congestive heart failure)    No significant past surgical history    Below knee amputation status, left    History of right cataract extraction    History of left cataract extraction    S/P arteriovenous (AV) fistula creation    H/O hematuria    H/O transurethral destruction of bladder lesion    History of excision of mass        PLAN:  HPI:  Patient is 62 year old male from [facility], walks with RW, with PMH of ESRD on HD (TTS), BKA- L w/prosthetic leg, DM, Left eye blindness, CHF, CAD, HTN, HLD, osteoporosis, bronchitis, current smoker, and anemia who presents with complaint of missed dialysis. Last HD 7/22. Pt states he often missed HD sessions.  patient states he missed this HD session due to mild pain in left leg, patient remains able to ambulate. Pt complains of right leg swelling and states he does not make any urine. Patient states he was having mild shortness of breath.  Pt denies any fever, chills, N/V/D, constipation, CP, numbness or tingling (26 Jul 2023 19:20)    # CASE DISCUSSED AT LENGTH WITH PATIENT'S BROTHER ARTEMIO @ [phone]. DISCUSSED REGARDING CURRENT CLINICAL CONDITION, RECOMMENDED EVALUATIONS AND INTERVENTIONS. ALL QUESTIONS ANSWERED. DISCUSSED THAT PROGNOSIS IS POOR/GRIM GIVEN UNDERLYING MEDICAL CONDITIONS. ALSO DISCUSSED THAT DESPITE VERBALIZING UNDERSTANDING OF MEDICAL CONDITIONS AND RECOMMENDED INTERVENTIONS - PATIENT PERSISTENTLY REMAINS NONCOMPLIANT. TEAM HAS OFFERED PATIENT EMOTIONAL SUPPORT AND OFFERED MEDICATION FOR MOOD. FAMILY HAS ALSO COUNSELLED PATIENT AT LENGTH AND UNDERSTAND THAT HIS PROGNOSIS IS POOR GIVEN WORSENING CLINICAL CONDITION AND HIS RECURRENT NONCOMPLIANCE. FAMILY ALSO CONVEYS THAT THEY ARE AGREEABLE TO PALLIATIVE CARE DISCUSSION WITH PATIENT AND FAMILY [8/3]   - PALLIATIVE CARE AND PSYCHIATRY RE-CONSULTED. FAMILY IS AGREEABLE.       # PATIENT W/ HISTORY OF ROUTINE NONCOMPLIANCE W/ LIFE-SUSTAINING TREATMENT [HD], EVALUATIONS AND INTERVENTIONS - COUNSELLED AT LENGTH TO REMAIN COMPLIANT. D/W PATIENT THAT PROGNOSIS IS GRIM/POOR. HE VERBALIZED UNDERSTANDING. REGARDING GOC - PATIENT WISHES FOR FULL CODE. PALLIATIVE CARE CONSULTED. PSYCHIATRY CONSULTED.  - PATIENT IS ORIENTED TO PERSON, PLACE AND CIRCUMSTANCE. HE EXPRESSED THAT HE DOES GROW IMPATIENT DURING DIALYSIS SESSIONS AND HAS BEEN LEAVING SESSIONS SOONER THAN PRESCRIBED. IN DISCUSSION THAT RISKS OF DEFERRING COMPLETE HD - INCLUDE BUT ARE NOT LIMITED TO WORSENING CLINICAL CONDITION, ELECTROLYTE ABNORMALITIES, ARRHYTHMIA AND DEATH. HE VERBALIZED UNDERSTANDING. HE REFUSES TO CONSIDER A NURSING HOME WITH ONSITE HD.   - D/W PATIENT THAT REPEATEDLY REFUSING INTERVENTIONS AND ASSESSMENTS IS NOT IN LINE WITH HIS WISHES TO IMPROVE. PATIENT VERBALIZED UNDERSTANDING AND AGREEMENT. IN DISCUSSION, REGARDING POSSIBLE ETIOLOGY - PSYCHIATRY WAS CONSULTED TO DISCUSS MOOD, PATIENT DOES EXPRESS THAT HE HAS SOME DEPRESSION GIVEN HIS MEDICAL CONDITIONS. BUT HE DENIES THAT THIS IS THE ETIOLOGY FOR NONCOMPLIANCE. HE EXPLAINS THAT HE DOES NOT LIKE BEING CONFINED TO A DIALYSIS MACHINE. OFFERED FOR PATIENT TO CONSIDER NURSING HOME W/ ONSITE HD. PATIENT WISHES TO CONTINUE TO LIVE IN ASSISTED LIVING.    - [8/2] - PATIENT REFUSING HD, REMOVED IV LINE, REFUSING MEDICATION - COUNSELLED BY TEAM AND BY FAMILY - THAT RISKS OF NONCOMPLIANCE INCLUDE BUT ARE NOT LIMITED TO WORSENING CLINICAL CONDITION AND DEATH. PATIENT VERBALIZED UNDERSTANDING, HOWEVER SHORTLY THEREAFTER REMOVED IV LINE. FAMILY ALSO COUNSELLING PATIENT TO COMPLY. PROGNOSIS IS POOR. PATIENT AND FAMILY VERBALIZE UNDERSTANDING. PALLIATIVE CARE CONSULT IN PROGRESS    # ACUTE ON CHRONIC HYPOXIC RESPIRATORY FAILURE S/T PULMONARY EDEMA - S/T INCOMPLETE HD SESSIONS ; ANASARCA  # HYPERKALEMIA  # HX OF COPD + S/P RECENT MULTIFOCAL PNEUMONIA ; R/O ASPIRATION PNEUMONIA  # PULMONARY HYPERTENSION  # UNCONTROLLED HTN  # ESRD ON HD TTS - W/ HX OF NONCOMPLIANCE    - TELEMETRY  - HYDRALAZINE, METOPROLOL AND NORVASC ; PRN IV ANTIHYPERTENSIVE  - LOKELMA  - SUPPLEMENTAL O2  - PLANNED FOR HD  - NEPHROLOGY CONSULT  - PULMONOLOGY CONSULT  - ID CONSULT    - PLANNED FOR HD 7/26, 7/27 [INCOMPLETE SESSIONS], 8/1   - REFUSED HD ON 7/28, 7/31  - UNDERWENT HD 7/29 [COMPLETE SESSION]      - [7/30] - OVERNIGHT RAPID RESPONSE S/T HYPOXIA - SELF-LIMITED - PATIENT IMPROVED S/P O2 SUPPLEMENT  - EMPIRICALLY STARTED ON CEFEPIME + FLAGYLL, F/U BCX       # RECURRENT INTRACTABLE NAUSEA/VOMITING, ? COFFEE GROUND EMESIS  # GASTROPARESIS  # HISTORY OF ESOPHAGITIS AND DUODENITIS [1/2021], HX OF GASTROPARESIS W/ EVIDENCE OF THICKENED ESOPHAGUS ON PREVIOUS CT SCAN   # PANCREAS W/ DOUBLE DUCT SIGN    - MONITORING HGB, PLACED ON PPI BID , CARAFATE QID  - PRN ANTIEMETICS   - MONITORING FOR SYMPTOMS  - WHILE ON DIET PATIENT REPEATEDLY COUNSELLED TO CHEW FOOD CAUTIOUSLY AND CONSUME SMALL BITES W/ REGULAR CLEARING WITH WATER BETWEEN BITES    - TRIAL OF CLEAR LIQUID DIET  - NOTED CT A/P    - S/P RECENT PRBC TRANSFUSION     - GI CONSULT  - SURGERY CONSULT    # R/O CHOLECYSTITIS  - PLACED ON CEFEPIME, F/U BCX  - NOTED CT A/P  - HIDA SCAN - NEGATIVE  - SURGERY CONSULT    # ELEVATED TROPONINS - ? DEMAND ISCHEMIA    - S/P TELEMETRY  - TREND TROPONINS  - ECHO - TRACE MR, MODERATELY INCREASED LV WALL THICKNESS, NORMAL LV SYSTOLIC FUNCTION, SEVERE PULMONARY HTN  - CARDIOLOGY CONSULT    # GASTROPARESIS  # UNDERLYING DM  - SSI + FS  - COUNSELLED TO COMPLY WITH HD TO REDUCE UREMIA    # DEPRESSION  - PLACED ON ZOLOFT  - PSYCHIATRY CONSULT    # PATIENT UNDERGOING OUTPATIENT WORKUP FOR CENTRAL VENOUS STENOSIS THROUGH NEPHROLOGY TEAM  - ? s/p STENT PLACEMENT    # ANEMIA OF CKD  # HX OF PANCYTOPENIA   - TREND HGB, TRANSFUSION THRESHOLD HGB < 7; PATIENT STILL INTERMITTENTLY REFUSING BLOODWORK, COUNSELLED TO COMPLY  - TYPE AND SCREEN  - ON EPO  - DENIES RECENT HEMATEMESIS, MELENA, HEMATOCHEZIA    - S/P RECENT PRBC TRANSFUSION  - S/P PRBC TRANSFUSION 7/29    - PPI BID, CARAFATE QID    # SEVERE PROTEIN CALORIE MALNUTRITION, FAILURE TO THRIVE   -  TEMPORAL WASTING, LOSS OF MUSCLE MASS FROM SHOULDER AND HIP GIRDLE  - NUTRITIONAL SUPPLEMENT    # HLD  # PARTIALLY BLIND  # S/P LEFT BKA  # HX OF PVD  # LS SPINAL STENOSIS  # GI AND DVT PPX   Patient is a 62y old  Male who presents with a chief complaint of Missed dialysis (02 Aug 2023 19:26)    PATIENT IS SEEN AND EXAMINED IN MEDICAL FLOOR. PATIENT STILL WITH INTERMITTENT RECURRENT EMESIS.    ALLERGIES:  No Known Drug Allergies  fish (Rash)  liver (Anaphylaxis)        VITALS:    Vital Signs Last 24 Hrs  T(C): 36.9 (03 Aug 2023 05:07), Max: 37.2 (02 Aug 2023 12:47)  T(F): 98.4 (03 Aug 2023 05:07), Max: 98.9 (02 Aug 2023 12:47)  HR: 97 (03 Aug 2023 08:59) (96 - 102)  BP: 168/71 (03 Aug 2023 08:59) (164/73 - 172/77)  BP(mean): --  RR: 18 (03 Aug 2023 05:07) (18 - 18)  SpO2: 92% (02 Aug 2023 21:24) (92% - 94%)    Parameters below as of 02 Aug 2023 21:24  Patient On (Oxygen Delivery Method): room air  O2 Flow (L/min): 2      LABS:    CBC Full  -  ( 01 Aug 2023 17:05 )  WBC Count : 5.34 K/uL  RBC Count : 2.73 M/uL  Hemoglobin : 7.3 g/dL  Hematocrit : 23.4 %  Platelet Count - Automated : 116 K/uL  Mean Cell Volume : 85.7 fl  Mean Cell Hemoglobin : 26.7 pg  Mean Cell Hemoglobin Concentration : 31.2 gm/dL  Auto Neutrophil # : x  Auto Lymphocyte # : x  Auto Monocyte # : x  Auto Eosinophil # : x  Auto Basophil # : x  Auto Neutrophil % : x  Auto Lymphocyte % : x  Auto Monocyte % : x  Auto Eosinophil % : x  Auto Basophil % : x      08-01    139  |  101  |  101<H>  ----------------------------<  86  4.4   |  23  |  16.10<H>    Ca    8.3<L>      01 Aug 2023 17:05  Phos  7.1     08-01  Mg     3.1     08-01    TPro  6.6  /  Alb  2.9<L>  /  TBili  0.8  /  DBili  x   /  AST  45<H>  /  ALT  42  /  AlkPhos  73  08-01    CAPILLARY BLOOD GLUCOSE      POCT Blood Glucose.: 93 mg/dL (03 Aug 2023 07:38)  POCT Blood Glucose.: 86 mg/dL (02 Aug 2023 21:33)  POCT Blood Glucose.: 86 mg/dL (02 Aug 2023 16:57)  POCT Blood Glucose.: 80 mg/dL (02 Aug 2023 15:29)        LIVER FUNCTIONS - ( 01 Aug 2023 17:05 )  Alb: 2.9 g/dL / Pro: 6.6 g/dL / ALK PHOS: 73 U/L / ALT: 42 U/L DA / AST: 45 U/L / GGT: x           Creatinine Trend: 16.10<--, 13.80<--, 11.30<--, 16.60<--, 15.60<--, 15.80<--  I&O's Summary          .Blood Blood-Peripheral  05-28 @ 14:07   No Growth Final  --  --          MEDICATIONS:    MEDICATIONS  (STANDING):  albuterol/ipratropium for Nebulization 3 milliLiter(s) Nebulizer every 6 hours  amLODIPine   Tablet 10 milliGRAM(s) Oral daily  aspirin enteric coated 81 milliGRAM(s) Oral daily  atorvastatin 40 milliGRAM(s) Oral at bedtime  budesonide 160 MICROgram(s)/formoterol 4.5 MICROgram(s) Inhaler 2 Puff(s) Inhalation two times a day  cefepime   IVPB 1000 milliGRAM(s) IV Intermittent every 24 hours  chlorhexidine 2% Cloths 1 Application(s) Topical daily  dextrose 5%. 1000 milliLiter(s) (50 mL/Hr) IV Continuous <Continuous>  dextrose 5%. 1000 milliLiter(s) (100 mL/Hr) IV Continuous <Continuous>  dextrose 50% Injectable 12.5 Gram(s) IV Push once  dextrose 50% Injectable 25 Gram(s) IV Push once  dextrose 50% Injectable 25 Gram(s) IV Push once  epoetin beverley (PROCRIT) Injectable 06355 Unit(s) IV Push <User Schedule>  folic acid 1 milliGRAM(s) Oral daily  furosemide    Tablet 80 milliGRAM(s) Oral daily  glucagon  Injectable 1 milliGRAM(s) IntraMuscular once  hydrALAZINE 25 milliGRAM(s) Oral three times a day  insulin glargine Injectable (LANTUS) 4 Unit(s) SubCutaneous at bedtime  latanoprost 0.005% Ophthalmic Solution 1 Drop(s) Both EYES at bedtime  levothyroxine 25 MICROGram(s) Oral daily  LORazepam     Tablet 0.5 milliGRAM(s) Oral <User Schedule>  metoprolol succinate ER 50 milliGRAM(s) Oral daily  metroNIDAZOLE  IVPB 500 milliGRAM(s) IV Intermittent every 8 hours  pantoprazole  Injectable 40 milliGRAM(s) IV Push every 12 hours  sertraline 50 milliGRAM(s) Oral daily  sevelamer carbonate 800 milliGRAM(s) Oral three times a day with meals  sucralfate 1 Gram(s) Oral four times a day      MEDICATIONS  (PRN):  dextrose Oral Gel 15 Gram(s) Oral once PRN Blood Glucose LESS THAN 70 milliGRAM(s)/deciliter  haloperidol    Injectable 0.5 milliGRAM(s) IntraMuscular every 8 hours PRN Agitation  ondansetron Injectable 4 milliGRAM(s) IV Push every 6 hours PRN Nausea and/or Vomiting      REVIEW OF SYSTEMS:                           ALL ROS DONE [ X   ]    CONSTITUTIONAL:  LETHARGIC [   ], FEVER [   ], UNRESPONSIVE [   ]  CVS:  CP  [   ], SOB, [   ], PALPITATIONS [   ], DIZZYNESS [   ]  RS: COUGH [   ], SPUTUM [   ]  GI: ABDOMINAL PAIN [   ], NAUSEA [   ], VOMITINGS [   ], DIARRHEA [   ], CONSTIPATION [   ]  :  DYSURIA [   ], NOCTURIA [   ], INCREASED FREQUENCY [   ], DRIBLING [   ],  SKELETAL: PAINFUL JOINTS [   ], SWOLLEN JOINTS [   ], NECK ACHE [   ], LOW BACK ACHE [   ],  SKIN : ULCERS [   ], RASH [   ], ITCHING [   ]  CNS: HEAD ACHE [   ], DOUBLE VISION [   ], BLURRED VISION [   ], AMS / CONFUSION [   ], SEIZURES [   ], WEAKNESS [   ],TINGLING / NUMBNESS [   ]    PHYSICAL EXAMINATION:  GENERAL APPEARANCE: NO DISTRESS,    ANASARCA ++  HEENT:  NO PALLOR, NO  JVD,  NO   NODES, NECK SUPPLE  CVS: S1 +, S2 +,   RS: AEEB,  OCCASIONAL  RALES +,   CRACKLES+ AT LUNG BASES  ABD: SOFT, NT, NO, BS +  EXT: LEFT BKA  SKIN: WARM,   SKELETAL:  ROM ACCEPTABLE  CNS:  AAO X  3      RADIOLOGY :      ASSESSMENT :     Hypervolemia    No pertinent past medical history    Hypertension    Adrenal insufficiency    CKD (chronic kidney disease)    Anemia    Glaucoma    Coronary artery disease    HLD (hyperlipidemia)    Peripheral vascular disease    Spinal stenosis of lumbosacral region    Hyperparathyroidism    Diabetes mellitus    Diabetic neuropathy    Contracture of hand    Osteoarthritis    Osteoporosis    Vision loss of left eye    ESRD on hemodialysis    Cataract    BPH (benign prostatic hyperplasia)    UTI (urinary tract infection)    Bladder mass    H/O hematuria    Osteoporosis    Vision loss of right eye    Depression    Chronic GERD    Osteomyelitis of vertebra    CHF (congestive heart failure)    No significant past surgical history    Below knee amputation status, left    History of right cataract extraction    History of left cataract extraction    S/P arteriovenous (AV) fistula creation    H/O hematuria    H/O transurethral destruction of bladder lesion    History of excision of mass        PLAN:  HPI:  Patient is 62 year old male from [facility], walks with RW, with PMH of ESRD on HD (TTS), BKA- L w/prosthetic leg, DM, Left eye blindness, CHF, CAD, HTN, HLD, osteoporosis, bronchitis, current smoker, and anemia who presents with complaint of missed dialysis. Last HD 7/22. Pt states he often missed HD sessions.  patient states he missed this HD session due to mild pain in left leg, patient remains able to ambulate. Pt complains of right leg swelling and states he does not make any urine. Patient states he was having mild shortness of breath.  Pt denies any fever, chills, N/V/D, constipation, CP, numbness or tingling (26 Jul 2023 19:20)    # CASE DISCUSSED AT LENGTH WITH PATIENT'S BROTHER ARTEMIO @ [phone]. DISCUSSED REGARDING CURRENT CLINICAL CONDITION, RECOMMENDED EVALUATIONS AND INTERVENTIONS. ALL QUESTIONS ANSWERED. DISCUSSED THAT PROGNOSIS IS POOR/GRIM GIVEN UNDERLYING MEDICAL CONDITIONS. ALSO DISCUSSED THAT DESPITE VERBALIZING UNDERSTANDING OF MEDICAL CONDITIONS AND RECOMMENDED INTERVENTIONS - PATIENT PERSISTENTLY REMAINS NONCOMPLIANT. TEAM HAS OFFERED PATIENT EMOTIONAL SUPPORT AND OFFERED MEDICATION FOR MOOD. FAMILY HAS ALSO COUNSELLED PATIENT AT LENGTH AND UNDERSTAND THAT HIS PROGNOSIS IS POOR GIVEN WORSENING CLINICAL CONDITION AND HIS RECURRENT NONCOMPLIANCE. FAMILY ALSO CONVEYS THAT THEY ARE AGREEABLE TO PALLIATIVE CARE DISCUSSION WITH PATIENT AND FAMILY [8/3]   - PALLIATIVE CARE AND PSYCHIATRY RE-CONSULTED. FAMILY IS AGREEABLE.       # PATIENT W/ HISTORY OF ROUTINE NONCOMPLIANCE W/ LIFE-SUSTAINING TREATMENT [HD], EVALUATIONS AND INTERVENTIONS - COUNSELLED AT LENGTH TO REMAIN COMPLIANT. D/W PATIENT THAT PROGNOSIS IS GRIM/POOR. HE VERBALIZED UNDERSTANDING. REGARDING GOC - PATIENT WISHES FOR FULL CODE. PALLIATIVE CARE CONSULTED. PSYCHIATRY CONSULTED.  - PATIENT IS ORIENTED TO PERSON, PLACE AND CIRCUMSTANCE. HE EXPRESSED THAT HE DOES GROW IMPATIENT DURING DIALYSIS SESSIONS AND HAS BEEN LEAVING SESSIONS SOONER THAN PRESCRIBED. IN DISCUSSION THAT RISKS OF DEFERRING COMPLETE HD - INCLUDE BUT ARE NOT LIMITED TO WORSENING CLINICAL CONDITION, ELECTROLYTE ABNORMALITIES, ARRHYTHMIA AND DEATH. HE VERBALIZED UNDERSTANDING. HE REFUSES TO CONSIDER A NURSING HOME WITH ONSITE HD.   - D/W PATIENT THAT REPEATEDLY REFUSING INTERVENTIONS AND ASSESSMENTS IS NOT IN LINE WITH HIS WISHES TO IMPROVE. PATIENT VERBALIZED UNDERSTANDING AND AGREEMENT. IN DISCUSSION, REGARDING POSSIBLE ETIOLOGY - PSYCHIATRY WAS CONSULTED TO DISCUSS MOOD, PATIENT DOES EXPRESS THAT HE HAS SOME DEPRESSION GIVEN HIS MEDICAL CONDITIONS. BUT HE DENIES THAT THIS IS THE ETIOLOGY FOR NONCOMPLIANCE. HE EXPLAINS THAT HE DOES NOT LIKE BEING CONFINED TO A DIALYSIS MACHINE. OFFERED FOR PATIENT TO CONSIDER NURSING HOME W/ ONSITE HD. PATIENT WISHES TO CONTINUE TO LIVE IN ASSISTED LIVING.    - [8/2] - PATIENT REFUSING HD, REMOVED IV LINE, REFUSING MEDICATION - COUNSELLED BY TEAM AND BY FAMILY - THAT RISKS OF NONCOMPLIANCE INCLUDE BUT ARE NOT LIMITED TO WORSENING CLINICAL CONDITION AND DEATH. PATIENT VERBALIZED UNDERSTANDING, HOWEVER SHORTLY THEREAFTER REMOVED IV LINE. FAMILY ALSO COUNSELLING PATIENT TO COMPLY. PROGNOSIS IS POOR. PATIENT AND FAMILY VERBALIZE UNDERSTANDING. PALLIATIVE CARE CONSULT IN PROGRESS    # ACUTE ON CHRONIC HYPOXIC RESPIRATORY FAILURE S/T PULMONARY EDEMA - S/T INCOMPLETE HD SESSIONS ; ANASARCA  # HYPERKALEMIA  # HX OF COPD + S/P RECENT MULTIFOCAL PNEUMONIA ; R/O ASPIRATION PNEUMONIA  # PULMONARY HYPERTENSION  # UNCONTROLLED HTN  # ESRD ON HD TTS - W/ HX OF NONCOMPLIANCE    - TELEMETRY  - HYDRALAZINE, METOPROLOL AND NORVASC ; PRN IV ANTIHYPERTENSIVE  - LOKELMA  - SUPPLEMENTAL O2  - PLANNED FOR HD  - NEPHROLOGY CONSULT  - PULMONOLOGY CONSULT  - ID CONSULT    - PLANNED FOR HD 7/26, 7/27 [INCOMPLETE SESSIONS], 8/1   - REFUSED HD ON 7/28, 7/31  - UNDERWENT HD 7/29 [COMPLETE SESSION]      - [7/30] - OVERNIGHT RAPID RESPONSE S/T HYPOXIA - SELF-LIMITED - PATIENT IMPROVED S/P O2 SUPPLEMENT  - EMPIRICALLY STARTED ON CEFEPIME + FLAGYLL, F/U BCX       # RECURRENT INTRACTABLE NAUSEA/VOMITING, ? COFFEE GROUND EMESIS  # GASTROPARESIS  # HISTORY OF ESOPHAGITIS AND DUODENITIS [1/2021], HX OF GASTROPARESIS W/ EVIDENCE OF THICKENED ESOPHAGUS ON PREVIOUS CT SCAN   # PANCREAS W/ DOUBLE DUCT SIGN    - MONITORING HGB, PLACED ON PPI BID , CARAFATE QID  - PRN ANTIEMETICS  ; S/P DOSE OF REGLAN [WITH MINIMAL IMPROVEMENT]  - MONITORING FOR SYMPTOMS  - WHILE ON DIET PATIENT REPEATEDLY COUNSELLED TO CHEW FOOD CAUTIOUSLY AND CONSUME SMALL BITES W/ REGULAR CLEARING WITH WATER BETWEEN BITES    - TRIAL OF CLEAR LIQUID DIET  - NOTED CT A/P    - S/P RECENT PRBC TRANSFUSION     - GI CONSULT  - SURGERY CONSULT    # LESS LIKELY CHOLECYSTITIS  - PLACED ON CEFEPIME, F/U BCX  - NOTED CT A/P  - HIDA SCAN - NEGATIVE  - SURGERY CONSULT    # ELEVATED TROPONINS - ? DEMAND ISCHEMIA    - S/P TELEMETRY  - TREND TROPONINS  - ECHO - TRACE MR, MODERATELY INCREASED LV WALL THICKNESS, NORMAL LV SYSTOLIC FUNCTION, SEVERE PULMONARY HTN  - CARDIOLOGY CONSULT    # GASTROPARESIS  # UNDERLYING DM  - SSI + FS  - COUNSELLED TO COMPLY WITH HD TO REDUCE UREMIA      # DEPRESSION  - PLACED ON ZOLOFT  - PSYCHIATRY CONSULT    # PATIENT UNDERGOING OUTPATIENT WORKUP FOR CENTRAL VENOUS STENOSIS THROUGH NEPHROLOGY TEAM  - ? s/p STENT PLACEMENT    # ANEMIA OF CKD  # HX OF PANCYTOPENIA   - TREND HGB, TRANSFUSION THRESHOLD HGB < 7; PATIENT STILL INTERMITTENTLY REFUSING BLOODWORK, COUNSELLED TO COMPLY  - TYPE AND SCREEN  - ON EPO  - DENIES RECENT HEMATEMESIS, MELENA, HEMATOCHEZIA    - S/P RECENT PRBC TRANSFUSION  - S/P PRBC TRANSFUSION 7/29    - PPI BID, CARAFATE QID    # SEVERE PROTEIN CALORIE MALNUTRITION, FAILURE TO THRIVE   -  TEMPORAL WASTING, LOSS OF MUSCLE MASS FROM SHOULDER AND HIP GIRDLE  - NUTRITIONAL SUPPLEMENT    # HLD  # PARTIALLY BLIND  # S/P LEFT BKA  # HX OF PVD  # LS SPINAL STENOSIS  # GI AND DVT PPX

## 2023-08-03 NOTE — BH CONSULTATION LIAISON PROGRESS NOTE - NSBHMSESPABN_PSY_A_CORE
Slowed rate/Decreased productivity/Increased latency
Soft volume/Decreased productivity/Increased latency

## 2023-08-03 NOTE — BH CONSULTATION LIAISON PROGRESS NOTE - NSBHCHARTREVIEWLAB_PSY_A_CORE FT
CBC Full  -  ( 01 Aug 2023 17:05 )  WBC Count : 5.34 K/uL  RBC Count : 2.73 M/uL  Hemoglobin : 7.3 g/dL  Hematocrit : 23.4 %  Platelet Count - Automated : 116 K/uL  Mean Cell Volume : 85.7 fl  Mean Cell Hemoglobin : 26.7 pg  Mean Cell Hemoglobin Concentration : 31.2 gm/dL  Auto Neutrophil # : x  Auto Lymphocyte # : x  Auto Monocyte # : x  Auto Eosinophil # : x  Auto Basophil # : x  Auto Neutrophil % : x  Auto Lymphocyte % : x  Auto Monocyte % : x  Auto Eosinophil % : x  Auto Basophil % : x  08-01    139  |  101  |  101<H>  ----------------------------<  86  4.4   |  23  |  16.10<H>    Ca    8.3<L>      01 Aug 2023 17:05  Phos  7.1     08-01  Mg     3.1     08-01    TPro  6.6  /  Alb  2.9<L>  /  TBili  0.8  /  DBili  x   /  AST  45<H>  /  ALT  42  /  AlkPhos  73  08-01  
CBC Full  -  ( 28 Jul 2023 10:35 )  WBC Count : 3.47 K/uL  RBC Count : 2.61 M/uL  Hemoglobin : 7.0 g/dL  Hematocrit : 22.4 %  Platelet Count - Automated : 62 K/uL  Mean Cell Volume : 85.8 fl  Mean Cell Hemoglobin : 26.8 pg  Mean Cell Hemoglobin Concentration : 31.3 gm/dL  Auto Neutrophil # : 2.99 K/uL  Auto Lymphocyte # : 0.28 K/uL  Auto Monocyte # : 0.13 K/uL  Auto Eosinophil # : 0.06 K/uL  Auto Basophil # : 0.00 K/uL  Auto Neutrophil % : 86.2 %  Auto Lymphocyte % : 8.1 %  Auto Monocyte % : 3.7 %  Auto Eosinophil % : 1.7 %  Auto Basophil % : 0.0 %  07-28    139  |  101  |  106<H>  ----------------------------<  130<H>  5.4<H>   |  22  |  15.60<H>    Ca    8.0<L>      28 Jul 2023 10:35  Phos  5.7     07-28

## 2023-08-03 NOTE — PROGRESS NOTE ADULT - PROBLEM SELECTOR PLAN 2
-multifactorial including underlying psychiatric process, delirium, uremia/metabolic encephalopathy, adjustment reaction to ongoing HD and existential crisis  -c/w ativan, zoloft  -behavioral health following rec  Haldol 0.5 mg IM/IV q 8 hrs PRN for agitation

## 2023-08-03 NOTE — PROGRESS NOTE ADULT - PROBLEM SELECTOR PLAN 3
-on ppi BID, carafate QID  -s/p dose reglan with minimal improvement  -c/w PRN zofran  -c/w clear liquid diet

## 2023-08-03 NOTE — PROGRESS NOTE ADULT - SUBJECTIVE AND OBJECTIVE BOX
follow up on:  complex medical decision making related to goals of care    [facility] Geriatric and Palliative Consult Service:  Kathryn Rodgers DO: cell ([phone])  Herb Bonilla MD: cell ([phone])  Michael Rousseau NP: cell ([phone])   Tristin Streeter LMSW: cell ([phone])   Dot Galicia NP: via [employer] Teams    Can contact any Palliative Team member via [employer] Teams for consults and questions      OVERNIGHT EVENTS: No acute events. Pulled Peripheral IV, then U/S guided IV placed and pulled that one, refusing lab draws and difficulty tolerating PO meds due to n/v. Poor appetite and intake last 2 days. Intermittently refusing HD this admission and prior to admission, on TTS schedule. Last HD Tuesday, next scheduled today. Seen at the bedside, A/Ox3, he expressed difficulty with his clinical condition and ambivalence about continuing HD, feels tired of going to HD and wishes to     Present Symptoms: Mild, Moderate, Severe  Pain:             Location -                               Aggravating factors -             Quality -             Radiation -             Timing-             Severity (0-10 scale):             Minimal acceptable level (0-10 scale):  Fatigue:  Nausea:  Lack of Appetite:   SOB:  Depression:  Anxiety:  Review of Systems: [All others negative or Unable to obtain due to poor mentation]    CPOT:    https://www.sccm.org/getattachment/[email or web address]/Critical-Care-Pain-Observation-Tool-(CPOT)  PAIN AD Score:   [email or web address] (press ctrl +  left click to view)MEDICATIONS  (STANDING):  albuterol/ipratropium for Nebulization 3 milliLiter(s) Nebulizer every 6 hours  amLODIPine   Tablet 10 milliGRAM(s) Oral daily  aspirin enteric coated 81 milliGRAM(s) Oral daily  atorvastatin 40 milliGRAM(s) Oral at bedtime  budesonide 160 MICROgram(s)/formoterol 4.5 MICROgram(s) Inhaler 2 Puff(s) Inhalation two times a day  cefepime   IVPB 1000 milliGRAM(s) IV Intermittent every 24 hours  chlorhexidine 2% Cloths 1 Application(s) Topical daily  dextrose 5%. 1000 milliLiter(s) (100 mL/Hr) IV Continuous <Continuous>  dextrose 5%. 1000 milliLiter(s) (50 mL/Hr) IV Continuous <Continuous>  dextrose 50% Injectable 25 Gram(s) IV Push once  dextrose 50% Injectable 12.5 Gram(s) IV Push once  dextrose 50% Injectable 25 Gram(s) IV Push once  epoetin beverley (PROCRIT) Injectable 53091 Unit(s) IV Push <User Schedule>  folic acid 1 milliGRAM(s) Oral daily  furosemide    Tablet 80 milliGRAM(s) Oral daily  glucagon  Injectable 1 milliGRAM(s) IntraMuscular once  hydrALAZINE 25 milliGRAM(s) Oral three times a day  insulin glargine Injectable (LANTUS) 4 Unit(s) SubCutaneous at bedtime  latanoprost 0.005% Ophthalmic Solution 1 Drop(s) Both EYES at bedtime  levothyroxine 25 MICROGram(s) Oral daily  LORazepam     Tablet 0.5 milliGRAM(s) Oral <User Schedule>  metoprolol succinate ER 50 milliGRAM(s) Oral daily  metroNIDAZOLE  IVPB 500 milliGRAM(s) IV Intermittent every 8 hours  pantoprazole  Injectable 40 milliGRAM(s) IV Push every 12 hours  sertraline 50 milliGRAM(s) Oral daily  sevelamer carbonate 800 milliGRAM(s) Oral three times a day with meals  sucralfate 1 Gram(s) Oral four times a day    MEDICATIONS  (PRN):  dextrose Oral Gel 15 Gram(s) Oral once PRN Blood Glucose LESS THAN 70 milliGRAM(s)/deciliter  haloperidol    Injectable 0.5 milliGRAM(s) IntraMuscular every 8 hours PRN Agitation  ondansetron Injectable 4 milliGRAM(s) IV Push every 6 hours PRN Nausea and/or Vomiting      PHYSICAL EXAM:  Vital Signs Last 24 Hrs  T(C): 36.9 (03 Aug 2023 05:07), Max: 37.2 (02 Aug 2023 12:47)  T(F): 98.4 (03 Aug 2023 05:07), Max: 98.9 (02 Aug 2023 12:47)  HR: 97 (03 Aug 2023 08:59) (96 - 102)  BP: 168/71 (03 Aug 2023 08:59) (164/73 - 172/77)  BP(mean): --  RR: 18 (03 Aug 2023 05:07) (18 - 18)  SpO2: 92% (02 Aug 2023 21:24) (92% - 94%)    Parameters below as of 02 Aug 2023 21:24  Patient On (Oxygen Delivery Method): room air  O2 Flow (L/min): 2      General: alert  oriented x ____    lethargic distressed cachexia  verbal nonverbal  unarousable     Palliative Performance Scale/Karnofsky Score:  http://npcrc.org/files/news/palliative_performance_scale_ppsv2.pdf    HEENT: no abnormal lesion, dry mouth  ET tube/trach oral lesions:  Lungs: tachypnea/labored breathing, audible excessive secretions  CV: RRR, S1S2, tachycardia  GI: soft non distended non tender  incontinent               PEG/NG/OG tube  constipation  last BM:   : incontinent  oliguria/anuria  [place]  Musculoskeletal: weakness x4 edema x4    ambulatory with assistance   mostly/fully bedbound/wheelchair bound  Skin: no abnormal skin lesions, poor skin turgor, pressure ulcers stage:   Neuro: no deficits, mild cognitive impairment dsyphagia/dysarthria paresis  Oral intake ability: unable/only mouth care, minimal moderate full capability    LABS:                          7.3    5.34  )-----------( 116      ( 01 Aug 2023 17:05 )             23.4     08-01    139  |  101  |  101<H>  ----------------------------<  86  4.4   |  23  |  16.10<H>    Ca    8.3<L>      01 Aug 2023 17:05  Phos  7.1     08-01  Mg     3.1     08-01    TPro  6.6  /  Alb  2.9<L>  /  TBili  0.8  /  DBili  x   /  AST  45<H>  /  ALT  42  /  AlkPhos  73  08-01    Urinalysis Basic - ( 01 Aug 2023 17:05 )    Color: x / Appearance: x / SG: x / pH: x  Gluc: 86 mg/dL / Ketone: x  / Bili: x / Urobili: x   Blood: x / Protein: x / Nitrite: x   Leuk Esterase: x / RBC: x / WBC x   Sq Epi: x / Non Sq Epi: x / Bacteria: x        RADIOLOGY & ADDITIONAL STUDIES: follow up on:  complex medical decision making related to goals of care    [facility] Geriatric and Palliative Consult Service:  Kathryn Rodgers DO: cell ([phone])  Herb Bonilla MD: cell ([phone])  Michael Rousseau NP: cell ([phone])   Tristin Streeter LMSW: cell ([phone])   Dot Galicia NP: via [employer] Teams    Can contact any Palliative Team member via [employer] Teams for consults and questions      OVERNIGHT EVENTS: No acute events. Yesterday, pulled Peripheral IV, then U/S guided IV placed and pulled that one, refusing lab draws and difficulty tolerating PO meds due to n/v. Poor appetite and intake last 2 days. Intermittently refusing HD this admission and prior to admission, on TTS schedule. Last HD Tuesday, next scheduled today. Seen at the bedside, A/Ox3, he expressed difficulty with his clinical condition and ambivalence about continuing HD, feels tired of going to HD.    Present Symptoms: Mild, Moderate, Severe  Pain:             Location -                               Aggravating factors -             Quality -             Radiation -             Timing-             Severity (0-10 scale):             Minimal acceptable level (0-10 scale):  Fatigue:  Nausea:  Lack of Appetite:   SOB:  Depression:  Anxiety:  Review of Systems: [All others negative or Unable to obtain due to poor mentation]    CPOT:    https://www.sccm.org/getattachment/[email or web address]/Critical-Care-Pain-Observation-Tool-(CPOT)  PAIN AD Score:   [email or web address]://[email or web address]/[email or web address]/[email or web address] (press ctrl +  left click to view)MEDICATIONS  (STANDING):  albuterol/ipratropium for Nebulization 3 milliLiter(s) Nebulizer every 6 hours  amLODIPine   Tablet 10 milliGRAM(s) Oral daily  aspirin enteric coated 81 milliGRAM(s) Oral daily  atorvastatin 40 milliGRAM(s) Oral at bedtime  budesonide 160 MICROgram(s)/formoterol 4.5 MICROgram(s) Inhaler 2 Puff(s) Inhalation two times a day  cefepime   IVPB 1000 milliGRAM(s) IV Intermittent every 24 hours  chlorhexidine 2% Cloths 1 Application(s) Topical daily  dextrose 5%. 1000 milliLiter(s) (100 mL/Hr) IV Continuous <Continuous>  dextrose 5%. 1000 milliLiter(s) (50 mL/Hr) IV Continuous <Continuous>  dextrose 50% Injectable 25 Gram(s) IV Push once  dextrose 50% Injectable 12.5 Gram(s) IV Push once  dextrose 50% Injectable 25 Gram(s) IV Push once  epoetin beverley (PROCRIT) Injectable 61451 Unit(s) IV Push <User Schedule>  folic acid 1 milliGRAM(s) Oral daily  furosemide    Tablet 80 milliGRAM(s) Oral daily  glucagon  Injectable 1 milliGRAM(s) IntraMuscular once  hydrALAZINE 25 milliGRAM(s) Oral three times a day  insulin glargine Injectable (LANTUS) 4 Unit(s) SubCutaneous at bedtime  latanoprost 0.005% Ophthalmic Solution 1 Drop(s) Both EYES at bedtime  levothyroxine 25 MICROGram(s) Oral daily  LORazepam     Tablet 0.5 milliGRAM(s) Oral <User Schedule>  metoprolol succinate ER 50 milliGRAM(s) Oral daily  metroNIDAZOLE  IVPB 500 milliGRAM(s) IV Intermittent every 8 hours  pantoprazole  Injectable 40 milliGRAM(s) IV Push every 12 hours  sertraline 50 milliGRAM(s) Oral daily  sevelamer carbonate 800 milliGRAM(s) Oral three times a day with meals  sucralfate 1 Gram(s) Oral four times a day    MEDICATIONS  (PRN):  dextrose Oral Gel 15 Gram(s) Oral once PRN Blood Glucose LESS THAN 70 milliGRAM(s)/deciliter  haloperidol    Injectable 0.5 milliGRAM(s) IntraMuscular every 8 hours PRN Agitation  ondansetron Injectable 4 milliGRAM(s) IV Push every 6 hours PRN Nausea and/or Vomiting      PHYSICAL EXAM:  Vital Signs Last 24 Hrs  T(C): 36.9 (03 Aug 2023 05:07), Max: 37.2 (02 Aug 2023 12:47)  T(F): 98.4 (03 Aug 2023 05:07), Max: 98.9 (02 Aug 2023 12:47)  HR: 97 (03 Aug 2023 08:59) (96 - 102)  BP: 168/71 (03 Aug 2023 08:59) (164/73 - 172/77)  BP(mean): --  RR: 18 (03 Aug 2023 05:07) (18 - 18)  SpO2: 92% (02 Aug 2023 21:24) (92% - 94%)    Parameters below as of 02 Aug 2023 21:24  Patient On (Oxygen Delivery Method): room air  O2 Flow (L/min): 2      General: alert  oriented x ____    lethargic distressed cachexia  verbal nonverbal  unarousable     Palliative Performance Scale/Karnofsky Score:  http://npcrc.org/files/news/palliative_performance_scale_ppsv2.pdf    HEENT: no abnormal lesion, dry mouth  ET tube/trach oral lesions:  Lungs: tachypnea/labored breathing, audible excessive secretions  CV: RRR, S1S2, tachycardia  GI: soft non distended non tender  incontinent               PEG/NG/OG tube  constipation  last BM:   : incontinent  oliguria/anuria  [place]  Musculoskeletal: weakness x4 edema x4    ambulatory with assistance   mostly/fully bedbound/wheelchair bound  Skin: no abnormal skin lesions, poor skin turgor, pressure ulcers stage:   Neuro: no deficits, mild cognitive impairment dsyphagia/dysarthria paresis  Oral intake ability: unable/only mouth care, minimal moderate full capability    LABS:                          7.3    5.34  )-----------( 116      ( 01 Aug 2023 17:05 )             23.4     08-01    139  |  101  |  101<H>  ----------------------------<  86  4.4   |  23  |  16.10<H>    Ca    8.3<L>      01 Aug 2023 17:05  Phos  7.1     08-01  Mg     3.1     08-01    TPro  6.6  /  Alb  2.9<L>  /  TBili  0.8  /  DBili  x   /  AST  45<H>  /  ALT  42  /  AlkPhos  73  08-01    Urinalysis Basic - ( 01 Aug 2023 17:05 )    Color: x / Appearance: x / SG: x / pH: x  Gluc: 86 mg/dL / Ketone: x  / Bili: x / Urobili: x   Blood: x / Protein: x / Nitrite: x   Leuk Esterase: x / RBC: x / WBC x   Sq Epi: x / Non Sq Epi: x / Bacteria: x        RADIOLOGY & ADDITIONAL STUDIES: follow up on:  complex medical decision making related to goals of care    [facility] Geriatric and Palliative Consult Service:  Kathryn Rodgers DO: cell ([phone])  Herb Bonilla MD: cell ([phone])  Michael Rousseau NP: cell ([phone])   Tristin Streeter LMSW: cell ([phone])   Dot Galicia NP: via [employer] Teams    Can contact any Palliative Team member via [employer] Teams for consults and questions      OVERNIGHT EVENTS: No acute events. Yesterday, pulled Peripheral IV, then U/S guided IV placed and pulled that one, refusing lab draws and difficulty tolerating PO meds due to n/v. Poor appetite and intake last 2 days. Intermittently refusing HD this admission and prior to admission, on TTS schedule. Last HD Tuesday, next scheduled today. Seen at the bedside, A/Ox3, he expressed difficulty with his clinical condition and ambivalence about continuing HD, feels tired of going to HD.    Present Symptoms: Mild, Moderate, Severe  Pain: denies             Location -                               Aggravating factors -             Quality -             Radiation -             Timing-             Severity (0-10 scale):             Minimal acceptable level (0-10 scale):  Fatigue:  denies  Nausea:  severe  Lack of Appetite:  severe  SOB: denies  Depression: denies  Anxiety: denies  Review of Systems: all others negative     CPOT:    https://www.scc.org/getattachment/[email or web address]-2283w1155p4a/Critical-Care-Pain-Observation-Tool-(CPOT)  PAIN AD Score:   [email or web address] (press ctrl +  left click to view)MEDICATIONS  (STANDING):  albuterol/ipratropium for Nebulization 3 milliLiter(s) Nebulizer every 6 hours  amLODIPine   Tablet 10 milliGRAM(s) Oral daily  aspirin enteric coated 81 milliGRAM(s) Oral daily  atorvastatin 40 milliGRAM(s) Oral at bedtime  budesonide 160 MICROgram(s)/formoterol 4.5 MICROgram(s) Inhaler 2 Puff(s) Inhalation two times a day  cefepime   IVPB 1000 milliGRAM(s) IV Intermittent every 24 hours  chlorhexidine 2% Cloths 1 Application(s) Topical daily  dextrose 5%. 1000 milliLiter(s) (100 mL/Hr) IV Continuous <Continuous>  dextrose 5%. 1000 milliLiter(s) (50 mL/Hr) IV Continuous <Continuous>  dextrose 50% Injectable 25 Gram(s) IV Push once  dextrose 50% Injectable 12.5 Gram(s) IV Push once  dextrose 50% Injectable 25 Gram(s) IV Push once  epoetin beverley (PROCRIT) Injectable 32435 Unit(s) IV Push <User Schedule>  folic acid 1 milliGRAM(s) Oral daily  furosemide    Tablet 80 milliGRAM(s) Oral daily  glucagon  Injectable 1 milliGRAM(s) IntraMuscular once  hydrALAZINE 25 milliGRAM(s) Oral three times a day  insulin glargine Injectable (LANTUS) 4 Unit(s) SubCutaneous at bedtime  latanoprost 0.005% Ophthalmic Solution 1 Drop(s) Both EYES at bedtime  levothyroxine 25 MICROGram(s) Oral daily  LORazepam     Tablet 0.5 milliGRAM(s) Oral <User Schedule>  metoprolol succinate ER 50 milliGRAM(s) Oral daily  metroNIDAZOLE  IVPB 500 milliGRAM(s) IV Intermittent every 8 hours  pantoprazole  Injectable 40 milliGRAM(s) IV Push every 12 hours  sertraline 50 milliGRAM(s) Oral daily  sevelamer carbonate 800 milliGRAM(s) Oral three times a day with meals  sucralfate 1 Gram(s) Oral four times a day    MEDICATIONS  (PRN):  dextrose Oral Gel 15 Gram(s) Oral once PRN Blood Glucose LESS THAN 70 milliGRAM(s)/deciliter  haloperidol    Injectable 0.5 milliGRAM(s) IntraMuscular every 8 hours PRN Agitation  ondansetron Injectable 4 milliGRAM(s) IV Push every 6 hours PRN Nausea and/or Vomiting      PHYSICAL EXAM:  Vital Signs Last 24 Hrs  T(C): 36.9 (03 Aug 2023 05:07), Max: 37.2 (02 Aug 2023 12:47)  T(F): 98.4 (03 Aug 2023 05:07), Max: 98.9 (02 Aug 2023 12:47)  HR: 97 (03 Aug 2023 08:59) (96 - 102)  BP: 168/71 (03 Aug 2023 08:59) (164/73 - 172/77)  BP(mean): --  RR: 18 (03 Aug 2023 05:07) (18 - 18)  SpO2: 92% (02 Aug 2023 21:24) (92% - 94%)    Parameters below as of 02 Aug 2023 21:24  Patient On (Oxygen Delivery Method): room air  O2 Flow (L/min): 2      General: alert  oriented x ____    lethargic distressed cachexia  verbal nonverbal  unarousable     Palliative Performance Scale/Karnofsky Score:  http://npcrc.org/files/news/palliative_performance_scale_ppsv2.pdf    HEENT: no abnormal lesion, dry mouth  ET tube/trach oral lesions:  Lungs: tachypnea/labored breathing, audible excessive secretions  CV: RRR, S1S2, tachycardia  GI: soft non distended non tender  incontinent               PEG/NG/OG tube  constipation  last BM:   : incontinent  oliguria/anuria  [place]  Musculoskeletal: weakness x4 edema x4    ambulatory with assistance   mostly/fully bedbound/wheelchair bound  Skin: no abnormal skin lesions, poor skin turgor, pressure ulcers stage:   Neuro: no deficits, mild cognitive impairment dsyphagia/dysarthria paresis  Oral intake ability: unable/only mouth care, minimal moderate full capability    LABS:                          7.3    5.34  )-----------( 116      ( 01 Aug 2023 17:05 )             23.4     08-01    139  |  101  |  101<H>  ----------------------------<  86  4.4   |  23  |  16.10<H>    Ca    8.3<L>      01 Aug 2023 17:05  Phos  7.1     08-01  Mg     3.1     08-01    TPro  6.6  /  Alb  2.9<L>  /  TBili  0.8  /  DBili  x   /  AST  45<H>  /  ALT  42  /  AlkPhos  73  08-01    Urinalysis Basic - ( 01 Aug 2023 17:05 )    Color: x / Appearance: x / SG: x / pH: x  Gluc: 86 mg/dL / Ketone: x  / Bili: x / Urobili: x   Blood: x / Protein: x / Nitrite: x   Leuk Esterase: x / RBC: x / WBC x   Sq Epi: x / Non Sq Epi: x / Bacteria: x        RADIOLOGY & ADDITIONAL STUDIES: follow up on:  complex medical decision making related to goals of care    [facility] Geriatric and Palliative Consult Service:  Kathryn Rodgers DO: cell ([phone])  Herb Bonilla MD: cell ([phone])  Michael Rousseau NP: cell ([phone])   Tristin Streeter LMSW: cell ([phone])   Dot Galicia NP: via [employer] Teams    Can contact any Palliative Team member via [employer] Teams for consults and questions      OVERNIGHT EVENTS: No acute events. Yesterday, pulled Peripheral IV, then U/S guided IV placed and pulled that one, refusing lab draws and difficulty tolerating PO meds due to n/v. Poor appetite and intake last 2 days. Intermittently refusing HD this admission and prior to admission, on TTS schedule. Last HD Tuesday, next scheduled today. Seen at the bedside, A/Ox3, he expressed difficulty with his clinical condition and ambivalence about continuing HD, feels tired of going to HD.    Present Symptoms: Mild, Moderate, Severe  Pain: denies             Location -                               Aggravating factors -             Quality -             Radiation -             Timing-             Severity (0-10 scale):             Minimal acceptable level (0-10 scale):  Fatigue:  denies  Nausea:  severe  Lack of Appetite:  severe  SOB: denies  Depression: denies  Anxiety: denies  Review of Systems: all others negative     CPOT:    https://www.scc.org/getattachment/ouc00i64-7l5w-[email or web address]-2666u0031e9s/Critical-Care-Pain-Observation-Tool-(CPOT)  PAIN AD Score:   [email or web address]/[email or web address] (press ctrl +  left click to view)MEDICATIONS  (STANDING):  albuterol/ipratropium for Nebulization 3 milliLiter(s) Nebulizer every 6 hours  amLODIPine   Tablet 10 milliGRAM(s) Oral daily  aspirin enteric coated 81 milliGRAM(s) Oral daily  atorvastatin 40 milliGRAM(s) Oral at bedtime  budesonide 160 MICROgram(s)/formoterol 4.5 MICROgram(s) Inhaler 2 Puff(s) Inhalation two times a day  cefepime   IVPB 1000 milliGRAM(s) IV Intermittent every 24 hours  chlorhexidine 2% Cloths 1 Application(s) Topical daily  dextrose 5%. 1000 milliLiter(s) (100 mL/Hr) IV Continuous <Continuous>  dextrose 5%. 1000 milliLiter(s) (50 mL/Hr) IV Continuous <Continuous>  dextrose 50% Injectable 25 Gram(s) IV Push once  dextrose 50% Injectable 12.5 Gram(s) IV Push once  dextrose 50% Injectable 25 Gram(s) IV Push once  epoetin beverley (PROCRIT) Injectable 82851 Unit(s) IV Push <User Schedule>  folic acid 1 milliGRAM(s) Oral daily  furosemide    Tablet 80 milliGRAM(s) Oral daily  glucagon  Injectable 1 milliGRAM(s) IntraMuscular once  hydrALAZINE 25 milliGRAM(s) Oral three times a day  insulin glargine Injectable (LANTUS) 4 Unit(s) SubCutaneous at bedtime  latanoprost 0.005% Ophthalmic Solution 1 Drop(s) Both EYES at bedtime  levothyroxine 25 MICROGram(s) Oral daily  LORazepam     Tablet 0.5 milliGRAM(s) Oral <User Schedule>  metoprolol succinate ER 50 milliGRAM(s) Oral daily  metroNIDAZOLE  IVPB 500 milliGRAM(s) IV Intermittent every 8 hours  pantoprazole  Injectable 40 milliGRAM(s) IV Push every 12 hours  sertraline 50 milliGRAM(s) Oral daily  sevelamer carbonate 800 milliGRAM(s) Oral three times a day with meals  sucralfate 1 Gram(s) Oral four times a day    MEDICATIONS  (PRN):  dextrose Oral Gel 15 Gram(s) Oral once PRN Blood Glucose LESS THAN 70 milliGRAM(s)/deciliter  haloperidol    Injectable 0.5 milliGRAM(s) IntraMuscular every 8 hours PRN Agitation  ondansetron Injectable 4 milliGRAM(s) IV Push every 6 hours PRN Nausea and/or Vomiting      PHYSICAL EXAM:  Vital Signs Last 24 Hrs  T(C): 36.9 (03 Aug 2023 05:07), Max: 37.2 (02 Aug 2023 12:47)  T(F): 98.4 (03 Aug 2023 05:07), Max: 98.9 (02 Aug 2023 12:47)  HR: 97 (03 Aug 2023 08:59) (96 - 102)  BP: 168/71 (03 Aug 2023 08:59) (164/73 - 172/77)  BP(mean): --  RR: 18 (03 Aug 2023 05:07) (18 - 18)  SpO2: 92% (02 Aug 2023 21:24) (92% - 94%)    Parameters below as of 02 Aug 2023 21:24  Patient On (Oxygen Delivery Method): room air  O2 Flow (L/min): 2      General: alert  oriented x 2-3  chronically ill appearing male seated at bedside lethargic readily verbally responsive    Palliative Performance Scale/Karnofsky Score: 40%  http://[email or web address].org/files/news/palliative_performance_scale_ppsv2.pdf    HEENT: no abnormal lesion, dry mouth    Lungs: CTA  CV: RRR, S1S2  GI: soft non distended non tender   : oliguric  Musculoskeletal: weakness x4 s/p L BKA edema x4    ambulatory with assistance limited by fatigue/lethargy  Skin: no abnormal skin lesions, poor skin turgor  Neuro: no deficits, mild cognitive impairment, lethargic but easily arousable to verbal stimuli, alert to self, situation, can tell year and month confused about time, easily re-oriented, follows commands and responds to questions appropriately  Oral intake ability:  minimal capability - on clears      LABS:                          7.3    5.34  )-----------( 116      ( 01 Aug 2023 17:05 )             23.4     08-01    139  |  101  |  101<H>  ----------------------------<  86  4.4   |  23  |  16.10<H>    Ca    8.3<L>      01 Aug 2023 17:05  Phos  7.1     08-01  Mg     3.1     08-01    TPro  6.6  /  Alb  2.9<L>  /  TBili  0.8  /  DBili  x   /  AST  45<H>  /  ALT  42  /  AlkPhos  73  08-01    Urinalysis Basic - ( 01 Aug 2023 17:05 )    Color: x / Appearance: x / SG: x / pH: x  Gluc: 86 mg/dL / Ketone: x  / Bili: x / Urobili: x   Blood: x / Protein: x / Nitrite: x   Leuk Esterase: x / RBC: x / WBC x   Sq Epi: x / Non Sq Epi: x / Bacteria: x        RADIOLOGY & ADDITIONAL STUDIES:

## 2023-08-03 NOTE — PROGRESS NOTE ADULT - PROBLEM SELECTOR PLAN 1
-continues to only attend HD sporadically and for brief periods, tends to go for a partial treatment about once per week  -clinical status remains poor with worsening fluid overload, waxing/waning mental status, periods of agitation/restlessness  -much education regarding continuation of HD as prescribed vs transition to comfort care/cessation of HD - pt remains unable to verbalize his wishes continues to state he wants treatment but will refuse when time for procedure  -suspect strong element of existential angst regarding the overall prognosis and disease progression due to noncompliance, much support provided  -GOC discussions with pt and HCP/brother Georgi King ongoing

## 2023-08-03 NOTE — PROGRESS NOTE ADULT - PROBLEM SELECTOR PLAN 4
-GOC and EOL care planning ongoing  -at this time pt with capacity and continues to refuse treatment but declines to make any plans towards transition to comfort care, much encouragement to allow IV lines and to go to HD  -continue to address psychosocial and spiritual issues, SW offered, chaplaincy offered but declined, behavioral health following    Discussed at length with the primary team.

## 2023-08-04 NOTE — PROGRESS NOTE ADULT - SUBJECTIVE AND OBJECTIVE BOX
NP Note discussed with Primary Attending.      Patient is a 62y old  Male who presents with a chief complaint of Missed dialysis (03 Aug 2023 19:03)      INTERVAL HPI/OVERNIGHT EVENTS: no new complaints    MEDICATIONS  (STANDING):  albuterol/ipratropium for Nebulization 3 milliLiter(s) Nebulizer every 6 hours  amLODIPine   Tablet 10 milliGRAM(s) Oral daily  aspirin enteric coated 81 milliGRAM(s) Oral daily  atorvastatin 40 milliGRAM(s) Oral at bedtime  budesonide 160 MICROgram(s)/formoterol 4.5 MICROgram(s) Inhaler 2 Puff(s) Inhalation two times a day  cefepime   IVPB 1000 milliGRAM(s) IV Intermittent every 24 hours  chlorhexidine 2% Cloths 1 Application(s) Topical daily  dextrose 5%. 1000 milliLiter(s) (50 mL/Hr) IV Continuous <Continuous>  dextrose 5%. 1000 milliLiter(s) (100 mL/Hr) IV Continuous <Continuous>  dextrose 50% Injectable 25 Gram(s) IV Push once  dextrose 50% Injectable 12.5 Gram(s) IV Push once  dextrose 50% Injectable 25 Gram(s) IV Push once  epoetin beverley (PROCRIT) Injectable 66598 Unit(s) IV Push <User Schedule>  folic acid 1 milliGRAM(s) Oral daily  furosemide    Tablet 80 milliGRAM(s) Oral daily  glucagon  Injectable 1 milliGRAM(s) IntraMuscular once  hydrALAZINE 25 milliGRAM(s) Oral three times a day  insulin glargine Injectable (LANTUS) 4 Unit(s) SubCutaneous at bedtime  latanoprost 0.005% Ophthalmic Solution 1 Drop(s) Both EYES at bedtime  levothyroxine 25 MICROGram(s) Oral daily  LORazepam     Tablet 0.5 milliGRAM(s) Oral <User Schedule>  metoprolol succinate ER 50 milliGRAM(s) Oral daily  metroNIDAZOLE  IVPB 500 milliGRAM(s) IV Intermittent every 8 hours  pantoprazole  Injectable 40 milliGRAM(s) IV Push every 12 hours  sertraline 200 milliGRAM(s) Oral daily  sevelamer carbonate 800 milliGRAM(s) Oral three times a day with meals  sucralfate 1 Gram(s) Oral four times a day    MEDICATIONS  (PRN):  dextrose Oral Gel 15 Gram(s) Oral once PRN Blood Glucose LESS THAN 70 milliGRAM(s)/deciliter  haloperidol    Injectable 0.5 milliGRAM(s) IntraMuscular every 8 hours PRN Agitation  ondansetron   Disintegrating Tablet 4 milliGRAM(s) Oral two times a day PRN Nausea and/or Vomiting  ondansetron Injectable 4 milliGRAM(s) IV Push every 6 hours PRN Nausea and/or Vomiting      __________________________________________________  REVIEW OF SYSTEMS:    CONSTITUTIONAL: No fever,   EYES: no acute visual disturbances  NECK: No pain or stiffness  RESPIRATORY: No cough; No shortness of breath  CARDIOVASCULAR: No chest pain, no palpitations  GASTROINTESTINAL: No pain. No nausea or vomiting; No diarrhea   NEUROLOGICAL: No headache or numbness, no tremors  MUSCULOSKELETAL: Generalized weakness, No joint pain, no muscle pain  GENITOURINARY: ESRD-HD, +dysuria, no frequency, no hesitancy  PSYCHIATRY: no depression , no anxiety  ALL OTHER  ROS negative        Vital Signs Last 24 Hrs  T(C): 36.3 (04 Aug 2023 04:39), Max: 36.6 (03 Aug 2023 13:30)  T(F): 97.4 (04 Aug 2023 04:39), Max: 97.9 (03 Aug 2023 20:00)  HR: 99 (04 Aug 2023 04:39) (98 - 102)  BP: 166/76 (04 Aug 2023 04:39) (157/71 - 172/66)  BP(mean): 108 (04 Aug 2023 04:39) (108 - 108)  RR: 18 (04 Aug 2023 04:39) (18 - 18)  SpO2: 99% (04 Aug 2023 04:39) (92% - 99%)    Parameters below as of 04 Aug 2023 04:39  Patient On (Oxygen Delivery Method): nasal cannula  O2 Flow (L/min): 4      ________________________________________________  PHYSICAL EXAM:  GENERAL: NAD  HEENT: Normocephalic;  conjunctivae and sclerae clear; moist mucous membranes;   NECK : supple  CHEST/LUNG: Clear to auscultation bilaterally with good air entry   HEART: S1 S2  regular; no murmurs, gallops or rubs  ABDOMEN: Soft, Nontender, Nondistended; Bowel sounds present  EXTREMITIES: no cyanosis; no edema; no calf tenderness  SKIN: warm and dry; no rash  NERVOUS SYSTEM:  Awake and alert; Oriented  to place, person and time ; no new deficits    _________________________________________________  LABS:                        7.7    4.59  )-----------( 86       ( 04 Aug 2023 07:58 )             25.1     08-04    142  |  100  |  88<H>  ----------------------------<  81  3.9   |  23  |  15.40<H>    Ca    8.4      04 Aug 2023 07:58        Urinalysis Basic - ( 04 Aug 2023 07:58 )    Color: x / Appearance: x / SG: x / pH: x  Gluc: 81 mg/dL / Ketone: x  / Bili: x / Urobili: x   Blood: x / Protein: x / Nitrite: x   Leuk Esterase: x / RBC: x / WBC x   Sq Epi: x / Non Sq Epi: x / Bacteria: x      CAPILLARY BLOOD GLUCOSE      POCT Blood Glucose.: 84 mg/dL (04 Aug 2023 08:10)  POCT Blood Glucose.: 89 mg/dL (03 Aug 2023 21:08)  POCT Blood Glucose.: 82 mg/dL (03 Aug 2023 16:36)  POCT Blood Glucose.: 100 mg/dL (03 Aug 2023 11:41)        RADIOLOGY & ADDITIONAL TESTS:  ACC: [id number] EXAM:  NM HEPATOBILIARY IMG W RX   ORDERED BY: SHAKIR MAC     PROCEDURE DATE:  07/31/2023          INTERPRETATION:  CLINICAL INFORMATION: 62-year-old man with RIGHT UPPER   quadrant pain referred for evaluation of acute cholecystitis.    DURATION of DYNAMIC SERIES: 45 minutes  RADIOPHARMACEUTICAL: 3.2 mCi Tc-99m-Mebrofenin, I.V.    TECHNIQUE: Dynamic imaging of the anterior abdomen was performed   following radiopharmaceutical injection. Patient declined static images.    COMPARISON: None    OTHER STUDIES USED FOR CORRELATION: CT abdomen/pelvis 7/30/2023    FINDINGS: There is prompt, homogeneous uptake of radiopharmaceutical by   the hepatocytes. Activity is seen in the gallbladder at approximately 15   minutes. Patient declined further imaging before bowel was visualized.   There is fair clearance of activity from the liver by the end of the   obtained images..    IMPRESSION: Patient declined to complete the full exam and bowel was not   visualized on the available images. Otherwise unremarkable exam with no   evidence of acute cholecystitis or biliary obstruction.        --- End of Report ---            JD TUCKER MD; Attending Radiologist  This document has been electronically signed. Jul 31 2023 10:59AM    ACC: [id number] EXAM:  CT ABDOMEN AND PELVIS   ORDERED BY: ALEXANDER MATIAS     PROCEDURE DATE:  07/30/2023          INTERPRETATION:  CLINICAL INFORMATION: Worsening nausea and vomiting with   dark colored emesis.    COMPARISON: 7/26/2023    CONTRAST/COMPLICATIONS:  IV Contrast: NONE  Oral Contrast: NONE  Complications: None reported at time of study completion    PROCEDURE:  CT of the Abdomen and Pelvis was performed.  Sagittal and coronal reformats were performed.    FINDINGS: The examination is limited by lack of IV contrast.  LOWER CHEST: Small bilateral pleural effusions. Small bilateral   atelectasis. Nonspecific groundglass densities in the right lower lung   zone; clinical correlation with pneumonia is suggested. Cardiomegaly and   mild pericardial effusion, unchanged.    LIVER: Within normal limits.  BILE DUCTS: Dilated common bile duct again noted.  GALLBLADDER: Small gallstones. Nonspecific trace pericholecystic fluid.  SPLEEN: Within normal limits.  PANCREAS: Dilated main pancreaticduct is again noted.  ADRENALS: The right adrenal appears unremarkable. Nonspecific left   adrenal thickening.  KIDNEYS/URETERS: Within normal limits except for a few small hypodense   lesions in the kidneys bilaterally, representing probable cysts.    BLADDER: Underdistended.  REPRODUCTIVE ORGANS: The prostate and seminal vesicles appear grossly   unremarkable.    BOWEL: No bowel obstruction. Appendix unremarkable. Nonspecific mild   distal esophageal mural thickening.  PERITONEUM: Small ascites. No free air.  VESSELS: Calcified atherosclerotic disease.  RETROPERITONEUM/LYMPH NODES: No lymphadenopathy.  ABDOMINAL WALL: Anasarca again noted.  BONES: No acute CT findings.    IMPRESSION: Small gallstones. Nonspecific trace pericholecystic fluid..   If there is a clinical suspicion for acute cholecystitis, gallbladder   ultrasound/HIDA scan may be pursued for further evaluation.    Stable dilated common bile duct and main pancreatic duct. Please see   previous MRCP dated 2/23/2023.    No bowel obstruction. Appendix unremarkable. Nonspecific mild distal   esophageal mural thickening.    Small ascites.    Anasarca.    Small bilateral pleural effusions. Small bilateral atelectasis.   Nonspecific groundglass densities in the right lower lungzone; clinical   correlation with pneumonia is suggested.    Cardiomegaly and mild pericardial effusion, unchanged.    --- End of Report ---            ROSALBA GREEN MD; Attending Radiologist  This document has been electronically signed. Jul 30 2023  2:23PM      Imaging  Reviewed:  YES/NO    Consultant(s) Notes Reviewed:   YES/ No      Plan of care was discussed with patient and /or primary care giver; all questions and concerns were addressed

## 2023-08-04 NOTE — PROGRESS NOTE ADULT - PROBLEM SELECTOR PLAN 3
-on ppi BID, carafate QID  -s/p dose reglan with minimal improvement  -c/w PRN zofran  -c/w clear liquid diet.    Pt allowed midline placement for IV antimetics

## 2023-08-04 NOTE — PROGRESS NOTE ADULT - PROBLEM SELECTOR PLAN 2
multifactorial including underlying psychiatric process, delirium, uremia/metabolic encephalopathy, adjustment reaction to ongoing HD and existential crisis  -Today, more uremic with hyperactive delirium, frequently getting up to stand, very restless  -start ativan 0.5 mg Q4H PRN  -zoloft  -behavioral health following rec  Haldol 0.5 mg IM/IV q 8 hrs PRN for agitation.    Pt allowed midline placement, now able to give IV meds for anxiety/agitation

## 2023-08-04 NOTE — PROGRESS NOTE ADULT - SUBJECTIVE AND OBJECTIVE BOX
[facility] : Progress Note :: [phone], (office [phone]),   Dr Mosher / Dr Washington / Dr Young / Dr Doll / Dr Varsha TURCIOS / Dr Tello / Dr Garcia / Dr Larry qiu  _____________________________________________________________________________________________    Patient refused cannulation yesterday and has been refusing HD.  He is now lethargic and has been  agitated, with significant uremia    No Known Drug Allergies  fish (Rash)  liver (Anaphylaxis)    Hospital Medications:   MEDICATIONS  (STANDING):  albuterol/ipratropium for Nebulization 3 milliLiter(s) Nebulizer every 6 hours  amLODIPine   Tablet 10 milliGRAM(s) Oral daily  aspirin enteric coated 81 milliGRAM(s) Oral daily  atorvastatin 40 milliGRAM(s) Oral at bedtime  budesonide 160 MICROgram(s)/formoterol 4.5 MICROgram(s) Inhaler 2 Puff(s) Inhalation two times a day  cefepime   IVPB 1000 milliGRAM(s) IV Intermittent every 24 hours  chlorhexidine 2% Cloths 1 Application(s) Topical daily  dextrose 5%. 1000 milliLiter(s) (50 mL/Hr) IV Continuous <Continuous>  dextrose 5%. 1000 milliLiter(s) (100 mL/Hr) IV Continuous <Continuous>  dextrose 50% Injectable 25 Gram(s) IV Push once  dextrose 50% Injectable 12.5 Gram(s) IV Push once  dextrose 50% Injectable 25 Gram(s) IV Push once  epoetin beverley (PROCRIT) Injectable 18073 Unit(s) IV Push <User Schedule>  folic acid 1 milliGRAM(s) Oral daily  furosemide   Injectable 40 milliGRAM(s) IV Push daily  glucagon  Injectable 1 milliGRAM(s) IntraMuscular once  hydrALAZINE Injectable 5 milliGRAM(s) IV Push three times a day  insulin glargine Injectable (LANTUS) 4 Unit(s) SubCutaneous at bedtime  latanoprost 0.005% Ophthalmic Solution 1 Drop(s) Both EYES at bedtime  levothyroxine 25 MICROGram(s) Oral daily  LORazepam     Tablet 0.5 milliGRAM(s) Oral <User Schedule>  metoprolol tartrate Injectable 5 milliGRAM(s) IV Push every 12 hours  metroNIDAZOLE  IVPB 500 milliGRAM(s) IV Intermittent every 8 hours  pantoprazole  Injectable 40 milliGRAM(s) IV Push every 12 hours  sertraline 200 milliGRAM(s) Oral daily  sevelamer carbonate 800 milliGRAM(s) Oral three times a day with meals  sucralfate 1 Gram(s) Oral four times a day        VITALS:  T(F): 97.7 (08-04-23 @ 16:21), Max: 97.9 (08-03-23 @ 20:00)  HR: 102 (08-04-23 @ 16:21)  BP: 165/98 (08-04-23 @ 16:21)  RR: 20 (08-04-23 @ 16:21)  SpO2: 98% (08-04-23 @ 14:07)  Wt(kg): --      PHYSICAL EXAM:  Constitutional: lethargic, asleep  not in acute distress  did not participate  with exam    LABS:  08-04    142  |  100  |  88<H>  ----------------------------<  81  3.9   |  23  |  15.40<H>    Ca    8.4      04 Aug 2023 07:58      Creatinine Trend: 15.40 <--, 16.10 <--, 13.80 <--, 11.30 <--, 16.60 <--                        7.7    4.59  )-----------( 86       ( 04 Aug 2023 07:58 )             25.1     Urine Studies:  Urinalysis Basic - ( 04 Aug 2023 07:58 )    Color:  / Appearance:  / SG:  / pH:   Gluc: 81 mg/dL / Ketone:   / Bili:  / Urobili:    Blood:  / Protein:  / Nitrite:    Leuk Esterase:  / RBC:  / WBC    Sq Epi:  / Non Sq Epi:  / Bacteria:         RADIOLOGY & ADDITIONAL STUDIES:

## 2023-08-04 NOTE — PROGRESS NOTE ADULT - PROBLEM SELECTOR PLAN 4
-GOC and EOL care planning ongoing  -at this time capacity waning and engaged the HCP pt's brother Georgi King for further decisions and he verbalized feeling distraught regarding the situation and the difficulties of the pt's refusals and he was adamant against DNR/I and hospice/comfort care, much support provided. He expressed feeling unable to make any decisions without meeting with the pt and plans to visit Sunday.   -continue to address psychosocial and spiritual issues, SW offered, chaplaincy offered but declined, behavioral health following  -attempt made to reach the pt's radha Beckham (resident at [facility]), please continue to arrange call  [phone],  room number/extension [phone].     Discussed at length with the primary team.

## 2023-08-04 NOTE — PROGRESS NOTE ADULT - ASSESSMENT
# ESRD. He has been refusing dialysis and cutting HD treatment when he agrees.  yesterday he refused  cannulation for HD.  has been agitated and pulling out IV lines.  he is progressively uremic and thus deemed not to have capacity to make medical decisions.  palliative care team have been discussing goals of care with his HCP ( his brother Georgi King).  I called MR King and discussed the patient's condition at length.  He is not doing HD and not participating with medical care.  he is uremic.  i recommended no further HD, DNR/DNI and transition to comfort care   Per him " my brother  has lost the will to live and thus we should make the best decision for  him which is topping HD"  He agreed to no further dialysis. He also wished to make PT DNR/DNI with transition to comfort care.  advised that palliaitve care NP will call him to formally prepare MOLST forms.  Per palliaitve care NP, PT was wavering on DNR/DNI when she called him thereafter.    No further HD. medical mx of symptoms. palliative  care team F/U with brother  on CODE status.

## 2023-08-04 NOTE — PROGRESS NOTE ADULT - SUBJECTIVE AND OBJECTIVE BOX
DATE OF SERVICE: 08-04-23    Patient denies chest pain or shortness of breath.   Review of symptoms otherwise negative.    albuterol/ipratropium for Nebulization 3 milliLiter(s) Nebulizer every 6 hours  amLODIPine   Tablet 10 milliGRAM(s) Oral daily  aspirin enteric coated 81 milliGRAM(s) Oral daily  atorvastatin 40 milliGRAM(s) Oral at bedtime  budesonide 160 MICROgram(s)/formoterol 4.5 MICROgram(s) Inhaler 2 Puff(s) Inhalation two times a day  cefepime   IVPB 1000 milliGRAM(s) IV Intermittent every 24 hours  chlorhexidine 2% Cloths 1 Application(s) Topical daily  dextrose 5%. 1000 milliLiter(s) IV Continuous <Continuous>  dextrose 5%. 1000 milliLiter(s) IV Continuous <Continuous>  dextrose 50% Injectable 25 Gram(s) IV Push once  dextrose 50% Injectable 12.5 Gram(s) IV Push once  dextrose 50% Injectable 25 Gram(s) IV Push once  dextrose Oral Gel 15 Gram(s) Oral once PRN  epoetin beverley (PROCRIT) Injectable 88152 Unit(s) IV Push <User Schedule>  folic acid 1 milliGRAM(s) Oral daily  furosemide    Tablet 80 milliGRAM(s) Oral daily  glucagon  Injectable 1 milliGRAM(s) IntraMuscular once  haloperidol    Injectable 0.5 milliGRAM(s) IntraMuscular every 8 hours PRN  hydrALAZINE 25 milliGRAM(s) Oral three times a day  insulin glargine Injectable (LANTUS) 4 Unit(s) SubCutaneous at bedtime  latanoprost 0.005% Ophthalmic Solution 1 Drop(s) Both EYES at bedtime  levothyroxine 25 MICROGram(s) Oral daily  LORazepam     Tablet 0.5 milliGRAM(s) Oral <User Schedule>  metoprolol succinate ER 50 milliGRAM(s) Oral daily  metroNIDAZOLE  IVPB 500 milliGRAM(s) IV Intermittent every 8 hours  ondansetron   Disintegrating Tablet 4 milliGRAM(s) Oral two times a day PRN  ondansetron Injectable 4 milliGRAM(s) IV Push every 6 hours PRN  pantoprazole  Injectable 40 milliGRAM(s) IV Push every 12 hours  sertraline 200 milliGRAM(s) Oral daily  sevelamer carbonate 800 milliGRAM(s) Oral three times a day with meals  sucralfate 1 Gram(s) Oral four times a day                            7.7    4.59  )-----------( 86       ( 04 Aug 2023 07:58 )             25.1       Hemoglobin: 7.7 g/dL (08-04 @ 07:58)  Hemoglobin: 7.3 g/dL (08-01 @ 17:05)  Hemoglobin: 7.5 g/dL (07-31 @ 23:40)  Hemoglobin: 7.8 g/dL (07-31 @ 09:00)      08-04    142  |  100  |  88<H>  ----------------------------<  81  3.9   |  23  |  15.40<H>    Ca    8.4      04 Aug 2023 07:58      Creatinine Trend: 15.40<--, 16.10<--, 13.80<--, 11.30<--, 16.60<--, 15.60<--    COAGS:           T(C): 36.3 (08-04-23 @ 04:39), Max: 36.6 (08-03-23 @ 13:30)  HR: 99 (08-04-23 @ 04:39) (98 - 102)  BP: 166/76 (08-04-23 @ 04:39) (157/71 - 172/66)  RR: 18 (08-04-23 @ 04:39) (18 - 18)  SpO2: 99% (08-04-23 @ 04:39) (92% - 99%)  Wt(kg): --    I&O's Summary          HEENT:  (-)icterus (-)pallor  CV: N S1 S2 1/6 DIANA (+)2 Pulses B/l  Resp:  Clear to ausculatation B/L, normal effort  GI: (+) BS Soft, NT, ND  Lymph:  (-)Edema, (-)obvious lymphadenopathy  Skin: Warm to touch, Normal turgor  Psych: Appropriate mood and affect         ASSESSMENT/PLAN: 	62y Male  Mateus Murray, walks with RW, with PMH of ESRD on HD (TTS), BKA- L w/prosthetic leg, DM, Left eye blindness, CHF,, HTN, HLD, EF 45-50%, normal perfusion 4/23 osteoporosis, bronchitis, current smoker, and anemia who presents with complaint of missed dialysis.     # CHF  - Due to missed HD  - HD per renal    # Positive trop  - nothing to suggest ACS.  His trop has been higher in the past and demonstrated normal perfusion on Stress 4/23  - ECHO noted, normal LV fx severe pulm HTN, cont to keep net negative     # Noncompliance  - Behavioral health f/u  - refusing meds and blood tests     # Smoking  - Cessation stressed    # HTN  - Suspect BP will improve once euvolemic     # N/V  - Surgery f/u    Dominic Still MD, [facility]  BEEPER [phone]

## 2023-08-04 NOTE — PROGRESS NOTE ADULT - SUBJECTIVE AND OBJECTIVE BOX
follow up on:  complex medical decision making related to goals of care    [facility] Geriatric and Palliative Consult Service:  Kathryn Rodgers DO: cell ([phone])  Herb Bonilla MD: cell ([phone])  Michael Rousseau NP: cell ([phone])   Tristin Streeter LMSW: cell ([phone])   Dot Galicia NP: via [employer] Teams    Can contact any Palliative Team member via [employer] Teams for consults and questions      OVERNIGHT EVENTS: Missed HD yesterday, today more uremic/lethargic and restless at times. Seen at the bedside, A/Ox1-2, participation in discussions limited due to lethargy and presents intattentive, requires multiple verbal prompts to respond to questions, frequent attempts to get up out of chair to stand and kneel on ground, difficult to redirect.    Present Symptoms: Mild, Moderate, Severe  Pain:             Location -      generalized                         Aggravating factors - unable to describe             Quality - unable to describe             Radiation - unable to describe             Timing- unable to describe             Severity (0-10 scale): unable to quantify             Minimal acceptable level (0-10 scale): unable to quantify  Fatigue: severe  Nausea: severe  Lack of Appetite: severe  SOB: denies  Depression: denies  Anxiety: moderate  Review of Systems: [All others negative or Unable to obtain due to poor mentation]    CPOT:    https://www.sccm.org/getattachment/[email or web address]/Critical-Care-Pain-Observation-Tool-(CPOT)  PAIN AD Score:   [email or web address] (press ctrl +  left click to view)MEDICATIONS  (STANDING):  albuterol/ipratropium for Nebulization 3 milliLiter(s) Nebulizer every 6 hours  amLODIPine   Tablet 10 milliGRAM(s) Oral daily  aspirin enteric coated 81 milliGRAM(s) Oral daily  atorvastatin 40 milliGRAM(s) Oral at bedtime  budesonide 160 MICROgram(s)/formoterol 4.5 MICROgram(s) Inhaler 2 Puff(s) Inhalation two times a day  cefepime   IVPB 1000 milliGRAM(s) IV Intermittent every 24 hours  chlorhexidine 2% Cloths 1 Application(s) Topical daily  dextrose 5%. 1000 milliLiter(s) (50 mL/Hr) IV Continuous <Continuous>  dextrose 5%. 1000 milliLiter(s) (100 mL/Hr) IV Continuous <Continuous>  dextrose 50% Injectable 25 Gram(s) IV Push once  dextrose 50% Injectable 12.5 Gram(s) IV Push once  dextrose 50% Injectable 25 Gram(s) IV Push once  epoetin beverley (PROCRIT) Injectable 57659 Unit(s) IV Push <User Schedule>  folic acid 1 milliGRAM(s) Oral daily  furosemide   Injectable 40 milliGRAM(s) IV Push daily  glucagon  Injectable 1 milliGRAM(s) IntraMuscular once  hydrALAZINE Injectable 5 milliGRAM(s) IV Push three times a day  insulin glargine Injectable (LANTUS) 4 Unit(s) SubCutaneous at bedtime  latanoprost 0.005% Ophthalmic Solution 1 Drop(s) Both EYES at bedtime  levothyroxine 25 MICROGram(s) Oral daily  LORazepam     Tablet 0.5 milliGRAM(s) Oral <User Schedule>  metoprolol tartrate Injectable 5 milliGRAM(s) IV Push every 12 hours  metroNIDAZOLE  IVPB 500 milliGRAM(s) IV Intermittent every 8 hours  pantoprazole  Injectable 40 milliGRAM(s) IV Push every 12 hours  sertraline 200 milliGRAM(s) Oral daily  sevelamer carbonate 800 milliGRAM(s) Oral three times a day with meals  sucralfate 1 Gram(s) Oral four times a day    MEDICATIONS  (PRN):  dextrose Oral Gel 15 Gram(s) Oral once PRN Blood Glucose LESS THAN 70 milliGRAM(s)/deciliter  haloperidol    Injectable 0.5 milliGRAM(s) IntraMuscular every 8 hours PRN Agitation  LORazepam   Injectable 0.5 milliGRAM(s) IV Push every 4 hours PRN agitation and nausea  ondansetron   Disintegrating Tablet 4 milliGRAM(s) Oral two times a day PRN Nausea and/or Vomiting  ondansetron Injectable 4 milliGRAM(s) IV Push every 6 hours PRN Nausea and/or Vomiting      PHYSICAL EXAM:  Vital Signs Last 24 Hrs  T(C): 36.4 (04 Aug 2023 14:07), Max: 36.6 (03 Aug 2023 20:00)  T(F): 97.6 (04 Aug 2023 14:07), Max: 97.9 (03 Aug 2023 20:00)  HR: 96 (04 Aug 2023 14:07) (96 - 100)  BP: 180/78 (04 Aug 2023 14:07) (166/76 - 180/78)  BP(mean): 108 (04 Aug 2023 04:39) (108 - 108)  RR: 16 (04 Aug 2023 14:07) (16 - 18)  SpO2: 98% (04 Aug 2023 14:07) (92% - 99%)    Parameters below as of 04 Aug 2023 14:07  Patient On (Oxygen Delivery Method): nasal cannula  O2 Flow (L/min): 3      General: alert  oriented x1-2 chronically ill appearing male seated at bedside lethargic occasional verbal response to questions, frequently getting up to stand from chair, difficult to redirect    Palliative Performance Scale/Karnofsky Score: 40%  [email or web address]    HEENT: no abnormal lesion, dry mouth    Lungs: CTA  CV: RRR, S1S2  GI: soft non distended non tender   : oliguric  Musculoskeletal: weakness x4 s/p L BKA edema x4    ambulatory with assistance limited by fatigue/lethargy, capable of few steps with assistanc  Skin: no abnormal skin lesions, poor skin turgor  Neuro: no deficits, mild cognitive impairment, lethargic and restless, minimal verbal responses to questions, alert to self, confused about situation, speech tangential at times, inattentive to conversation   Oral intake ability:  minimal capability - on clears    LABS:                          7.7    4.59  )-----------( 86       ( 04 Aug 2023 07:58 )             25.1     08-04    142  |  100  |  88<H>  ----------------------------<  81  3.9   |  23  |  15.40<H>    Ca    8.4      04 Aug 2023 07:58      Urinalysis Basic - ( 04 Aug 2023 07:58 )    Color: x / Appearance: x / SG: x / pH: x  Gluc: 81 mg/dL / Ketone: x  / Bili: x / Urobili: x   Blood: x / Protein: x / Nitrite: x   Leuk Esterase: x / RBC: x / WBC x   Sq Epi: x / Non Sq Epi: x / Bacteria: x        RADIOLOGY & ADDITIONAL STUDIES: follow up on:  complex medical decision making related to goals of care    [facility] Geriatric and Palliative Consult Service:  Kathryn Rodgers DO: cell ([phone])  Herb Bonilla MD: cell ([phone])  Michael Rousseau NP: cell ([phone])   Tristin Streeter LMSW: cell ([phone])   Dot Galicia NP: via [employer] Teams    Can contact any Palliative Team member via [employer] Teams for consults and questions      OVERNIGHT EVENTS: Missed HD yesterday, today more uremic/lethargic and restless at times. Seen at the bedside, A/Ox1-2, participation in discussions limited due to lethargy and presents intattentive, requires multiple verbal prompts to respond to questions, frequent attempts to get up out of chair to stand and kneel on ground, difficult to redirect.    Present Symptoms: Mild, Moderate, Severe  Pain:             Location -      generalized                         Aggravating factors - unable to describe             Quality - unable to describe             Radiation - unable to describe             Timing- unable to describe             Severity (0-10 scale): unable to quantify             Minimal acceptable level (0-10 scale): unable to quantify  Fatigue: severe  Nausea: severe  Lack of Appetite: severe  SOB: denies  Depression: denies  Anxiety: moderate  Review of Systems: [All others negative or Unable to obtain due to poor mentation]    CPOT:    https://www.sccm.org/getattachment/[email or web address]/Critical-Care-Pain-Observation-Tool-(CPOT)  PAIN AD Score:   [email or web address] (press ctrl +  left click to view)MEDICATIONS  (STANDING):  albuterol/ipratropium for Nebulization 3 milliLiter(s) Nebulizer every 6 hours  amLODIPine   Tablet 10 milliGRAM(s) Oral daily  aspirin enteric coated 81 milliGRAM(s) Oral daily  atorvastatin 40 milliGRAM(s) Oral at bedtime  budesonide 160 MICROgram(s)/formoterol 4.5 MICROgram(s) Inhaler 2 Puff(s) Inhalation two times a day  cefepime   IVPB 1000 milliGRAM(s) IV Intermittent every 24 hours  chlorhexidine 2% Cloths 1 Application(s) Topical daily  dextrose 5%. 1000 milliLiter(s) (50 mL/Hr) IV Continuous <Continuous>  dextrose 5%. 1000 milliLiter(s) (100 mL/Hr) IV Continuous <Continuous>  dextrose 50% Injectable 25 Gram(s) IV Push once  dextrose 50% Injectable 12.5 Gram(s) IV Push once  dextrose 50% Injectable 25 Gram(s) IV Push once  epoetin beverley (PROCRIT) Injectable 67936 Unit(s) IV Push <User Schedule>  folic acid 1 milliGRAM(s) Oral daily  furosemide   Injectable 40 milliGRAM(s) IV Push daily  glucagon  Injectable 1 milliGRAM(s) IntraMuscular once  hydrALAZINE Injectable 5 milliGRAM(s) IV Push three times a day  insulin glargine Injectable (LANTUS) 4 Unit(s) SubCutaneous at bedtime  latanoprost 0.005% Ophthalmic Solution 1 Drop(s) Both EYES at bedtime  levothyroxine 25 MICROGram(s) Oral daily  LORazepam     Tablet 0.5 milliGRAM(s) Oral <User Schedule>  metoprolol tartrate Injectable 5 milliGRAM(s) IV Push every 12 hours  metroNIDAZOLE  IVPB 500 milliGRAM(s) IV Intermittent every 8 hours  pantoprazole  Injectable 40 milliGRAM(s) IV Push every 12 hours  sertraline 200 milliGRAM(s) Oral daily  sevelamer carbonate 800 milliGRAM(s) Oral three times a day with meals  sucralfate 1 Gram(s) Oral four times a day    MEDICATIONS  (PRN):  dextrose Oral Gel 15 Gram(s) Oral once PRN Blood Glucose LESS THAN 70 milliGRAM(s)/deciliter  haloperidol    Injectable 0.5 milliGRAM(s) IntraMuscular every 8 hours PRN Agitation  LORazepam   Injectable 0.5 milliGRAM(s) IV Push every 4 hours PRN agitation and nausea  ondansetron   Disintegrating Tablet 4 milliGRAM(s) Oral two times a day PRN Nausea and/or Vomiting  ondansetron Injectable 4 milliGRAM(s) IV Push every 6 hours PRN Nausea and/or Vomiting      PHYSICAL EXAM:  Vital Signs Last 24 Hrs  T(C): 36.4 (04 Aug 2023 14:07), Max: 36.6 (03 Aug 2023 20:00)  T(F): 97.6 (04 Aug 2023 14:07), Max: 97.9 (03 Aug 2023 20:00)  HR: 96 (04 Aug 2023 14:07) (96 - 100)  BP: 180/78 (04 Aug 2023 14:07) (166/76 - 180/78)  BP(mean): 108 (04 Aug 2023 04:39) (108 - 108)  RR: 16 (04 Aug 2023 14:07) (16 - 18)  SpO2: 98% (04 Aug 2023 14:07) (92% - 99%)    Parameters below as of 04 Aug 2023 14:07  Patient On (Oxygen Delivery Method): nasal cannula  O2 Flow (L/min): 3      General: alert  oriented x1-2 chronically ill appearing male seated at bedside lethargic occasional verbal response to questions, frequently getting up to stand from chair, difficult to redirect    Palliative Performance Scale/Karnofsky Score: 40%  [email or web address]    HEENT: no abnormal lesion, dry mouth    Lungs: CTA  CV: RRR, S1S2  GI: soft non distended non tender   : oliguric  Musculoskeletal: weakness x4 s/p L BKA edema x4    ambulatory with assistance limited by fatigue/lethargy, capable of few steps with assistance  Skin: no abnormal skin lesions, poor skin turgor  Neuro: no deficits, mild cognitive impairment, lethargic and restless, minimal verbal responses to questions, alert to self, confused about situation, speech tangential at times, inattentive to conversation   Oral intake ability:  minimal capability - on clears    LABS:                          7.7    4.59  )-----------( 86       ( 04 Aug 2023 07:58 )             25.1     08-04    142  |  100  |  88<H>  ----------------------------<  81  3.9   |  23  |  15.40<H>    Ca    8.4      04 Aug 2023 07:58      Urinalysis Basic - ( 04 Aug 2023 07:58 )    Color: x / Appearance: x / SG: x / pH: x  Gluc: 81 mg/dL / Ketone: x  / Bili: x / Urobili: x   Blood: x / Protein: x / Nitrite: x   Leuk Esterase: x / RBC: x / WBC x   Sq Epi: x / Non Sq Epi: x / Bacteria: x        RADIOLOGY & ADDITIONAL STUDIES: follow up on:  complex medical decision making related to goals of care    [facility] Geriatric and Palliative Consult Service:  Kathryn Rodgers DO: cell ([phone])  Herb Bonilla MD: cell ([phone])  Michael Rousseau NP: cell ([phone])   Tristin Streeter LMSW: cell ([phone])   Dot Galicia NP: via [employer]    Can contact any Palliative Team member via [employer] for consults and questions      OVERNIGHT EVENTS: Missed HD yesterday, today more uremic/lethargic and restless at times. Seen at the bedside, A/Ox1-2, participation in discussions limited due to lethargy and presents intattentive, requires multiple verbal prompts to respond to questions, frequent attempts to get up out of chair to stand and kneel on ground, difficult to redirect.    Present Symptoms: Mild, Moderate, Severe  Pain:             Location -      generalized                         Aggravating factors - unable to describe             Quality - unable to describe             Radiation - unable to describe             Timing- unable to describe             Severity (0-10 scale): unable to quantify             Minimal acceptable level (0-10 scale): unable to quantify  Fatigue: severe  Nausea: severe  Lack of Appetite: severe  SOB: denies  Depression: denies  Anxiety: moderate  Review of Systems: All others negative     CPOT:    https://www.sccm.org/getattachment/[email or web address]-3260o3621i9e/Critical-Care-Pain-Observation-Tool-(CPOT)  PAIN AD Score:   [email or web address] (press ctrl +  left click to view)MEDICATIONS  (STANDING):  albuterol/ipratropium for Nebulization 3 milliLiter(s) Nebulizer every 6 hours  amLODIPine   Tablet 10 milliGRAM(s) Oral daily  aspirin enteric coated 81 milliGRAM(s) Oral daily  atorvastatin 40 milliGRAM(s) Oral at bedtime  budesonide 160 MICROgram(s)/formoterol 4.5 MICROgram(s) Inhaler 2 Puff(s) Inhalation two times a day  cefepime   IVPB 1000 milliGRAM(s) IV Intermittent every 24 hours  chlorhexidine 2% Cloths 1 Application(s) Topical daily  dextrose 5%. 1000 milliLiter(s) (50 mL/Hr) IV Continuous <Continuous>  dextrose 5%. 1000 milliLiter(s) (100 mL/Hr) IV Continuous <Continuous>  dextrose 50% Injectable 25 Gram(s) IV Push once  dextrose 50% Injectable 12.5 Gram(s) IV Push once  dextrose 50% Injectable 25 Gram(s) IV Push once  epoetin beverley (PROCRIT) Injectable 11087 Unit(s) IV Push <User Schedule>  folic acid 1 milliGRAM(s) Oral daily  furosemide   Injectable 40 milliGRAM(s) IV Push daily  glucagon  Injectable 1 milliGRAM(s) IntraMuscular once  hydrALAZINE Injectable 5 milliGRAM(s) IV Push three times a day  insulin glargine Injectable (LANTUS) 4 Unit(s) SubCutaneous at bedtime  latanoprost 0.005% Ophthalmic Solution 1 Drop(s) Both EYES at bedtime  levothyroxine 25 MICROGram(s) Oral daily  LORazepam     Tablet 0.5 milliGRAM(s) Oral <User Schedule>  metoprolol tartrate Injectable 5 milliGRAM(s) IV Push every 12 hours  metroNIDAZOLE  IVPB 500 milliGRAM(s) IV Intermittent every 8 hours  pantoprazole  Injectable 40 milliGRAM(s) IV Push every 12 hours  sertraline 200 milliGRAM(s) Oral daily  sevelamer carbonate 800 milliGRAM(s) Oral three times a day with meals  sucralfate 1 Gram(s) Oral four times a day    MEDICATIONS  (PRN):  dextrose Oral Gel 15 Gram(s) Oral once PRN Blood Glucose LESS THAN 70 milliGRAM(s)/deciliter  haloperidol    Injectable 0.5 milliGRAM(s) IntraMuscular every 8 hours PRN Agitation  LORazepam   Injectable 0.5 milliGRAM(s) IV Push every 4 hours PRN agitation and nausea  ondansetron   Disintegrating Tablet 4 milliGRAM(s) Oral two times a day PRN Nausea and/or Vomiting  ondansetron Injectable 4 milliGRAM(s) IV Push every 6 hours PRN Nausea and/or Vomiting      PHYSICAL EXAM:  Vital Signs Last 24 Hrs  T(C): 36.4 (04 Aug 2023 14:07), Max: 36.6 (03 Aug 2023 20:00)  T(F): 97.6 (04 Aug 2023 14:07), Max: 97.9 (03 Aug 2023 20:00)  HR: 96 (04 Aug 2023 14:07) (96 - 100)  BP: 180/78 (04 Aug 2023 14:07) (166/76 - 180/78)  BP(mean): 108 (04 Aug 2023 04:39) (108 - 108)  RR: 16 (04 Aug 2023 14:07) (16 - 18)  SpO2: 98% (04 Aug 2023 14:07) (92% - 99%)    Parameters below as of 04 Aug 2023 14:07  Patient On (Oxygen Delivery Method): nasal cannula  O2 Flow (L/min): 3      General: alert  oriented x1-2 chronically ill appearing male seated at bedside lethargic occasional verbal response to questions, frequently getting up to stand from chair, difficult to redirect    Palliative Performance Scale/Karnofsky Score: 40%  [email or web address]    HEENT: no abnormal lesion, dry mouth    Lungs: CTA  CV: RRR, S1S2  GI: soft non distended non tender   : oliguric  Musculoskeletal: weakness x4 s/p L BKA edema x4    ambulatory with assistance limited by fatigue/lethargy, capable of few steps with assistance  Skin: no abnormal skin lesions, poor skin turgor  Neuro: no deficits, mild cognitive impairment, lethargic and restless, minimal verbal responses to questions, alert to self, confused about situation, speech tangential at times, inattentive to conversation   Oral intake ability:  minimal capability - on clears    LABS:                          7.7    4.59  )-----------( 86       ( 04 Aug 2023 07:58 )             25.1     08-04    142  |  100  |  88<H>  ----------------------------<  81  3.9   |  23  |  15.40<H>    Ca    8.4      04 Aug 2023 07:58      Urinalysis Basic - ( 04 Aug 2023 07:58 )    Color: x / Appearance: x / SG: x / pH: x  Gluc: 81 mg/dL / Ketone: x  / Bili: x / Urobili: x   Blood: x / Protein: x / Nitrite: x   Leuk Esterase: x / RBC: x / WBC x   Sq Epi: x / Non Sq Epi: x / Bacteria: x        RADIOLOGY & ADDITIONAL STUDIES:

## 2023-08-04 NOTE — PROGRESS NOTE ADULT - SUBJECTIVE AND OBJECTIVE BOX
Patient is a 62y old  Male who presents with a chief complaint of Missed dialysis (8/4/23      INTERVAL HPI/OVERNIGHT EVENTS: no new complaints    MEDICATIONS  (STANDING):  albuterol/ipratropium for Nebulization 3 milliLiter(s) Nebulizer every 6 hours  amLODIPine   Tablet 10 milliGRAM(s) Oral daily  aspirin enteric coated 81 milliGRAM(s) Oral daily  atorvastatin 40 milliGRAM(s) Oral at bedtime  budesonide 160 MICROgram(s)/formoterol 4.5 MICROgram(s) Inhaler 2 Puff(s) Inhalation two times a day  cefepime   IVPB 1000 milliGRAM(s) IV Intermittent every 24 hours  chlorhexidine 2% Cloths 1 Application(s) Topical daily  dextrose 5%. 1000 milliLiter(s) (50 mL/Hr) IV Continuous <Continuous>  dextrose 5%. 1000 milliLiter(s) (100 mL/Hr) IV Continuous <Continuous>  dextrose 50% Injectable 25 Gram(s) IV Push once  dextrose 50% Injectable 12.5 Gram(s) IV Push once  dextrose 50% Injectable 25 Gram(s) IV Push once  epoetin beverley (PROCRIT) Injectable 05719 Unit(s) IV Push <User Schedule>  folic acid 1 milliGRAM(s) Oral daily  furosemide    Tablet 80 milliGRAM(s) Oral daily  glucagon  Injectable 1 milliGRAM(s) IntraMuscular once  hydrALAZINE 25 milliGRAM(s) Oral three times a day  insulin glargine Injectable (LANTUS) 4 Unit(s) SubCutaneous at bedtime  latanoprost 0.005% Ophthalmic Solution 1 Drop(s) Both EYES at bedtime  levothyroxine 25 MICROGram(s) Oral daily  LORazepam     Tablet 0.5 milliGRAM(s) Oral <User Schedule>  metoprolol succinate ER 50 milliGRAM(s) Oral daily  metroNIDAZOLE  IVPB 500 milliGRAM(s) IV Intermittent every 8 hours  pantoprazole  Injectable 40 milliGRAM(s) IV Push every 12 hours  sertraline 200 milliGRAM(s) Oral daily  sevelamer carbonate 800 milliGRAM(s) Oral three times a day with meals  sucralfate 1 Gram(s) Oral four times a day    MEDICATIONS  (PRN):  dextrose Oral Gel 15 Gram(s) Oral once PRN Blood Glucose LESS THAN 70 milliGRAM(s)/deciliter  haloperidol    Injectable 0.5 milliGRAM(s) IntraMuscular every 8 hours PRN Agitation  ondansetron   Disintegrating Tablet 4 milliGRAM(s) Oral two times a day PRN Nausea and/or Vomiting  ondansetron Injectable 4 milliGRAM(s) IV Push every 6 hours PRN Nausea and/or Vomiting      __________________________________________________  REVIEW OF SYSTEMS:    CONSTITUTIONAL: No fever,   EYES: no acute visual disturbances  NECK: No pain or stiffness  RESPIRATORY: No cough; No shortness of breath  CARDIOVASCULAR: No chest pain, no palpitations  GASTROINTESTINAL: No pain. No nausea or vomiting; No diarrhea   NEUROLOGICAL: No headache or numbness, no tremors  MUSCULOSKELETAL: Generalized weakness, No joint pain, no muscle pain  GENITOURINARY: ESRD-HD, +dysuria, no frequency, no hesitancy  PSYCHIATRY: no depression , no anxiety  ALL OTHER  ROS negative        Vital Signs Last 24 Hrs  T(C): 36.3 (04 Aug 2023 04:39), Max: 36.6 (03 Aug 2023 13:30)  T(F): 97.4 (04 Aug 2023 04:39), Max: 97.9 (03 Aug 2023 20:00)  HR: 99 (04 Aug 2023 04:39) (98 - 102)  BP: 166/76 (04 Aug 2023 04:39) (157/71 - 172/66)  BP(mean): 108 (04 Aug 2023 04:39) (108 - 108)  RR: 18 (04 Aug 2023 04:39) (18 - 18)  SpO2: 99% (04 Aug 2023 04:39) (92% - 99%)    Parameters below as of 04 Aug 2023 04:39  Patient On (Oxygen Delivery Method): nasal cannula  O2 Flow (L/min): 4      ________________________________________________  PHYSICAL EXAM:  GENERAL: NAD  HEENT: Normocephalic;  conjunctivae and sclerae clear; moist mucous membranes;   NECK : supple  CHEST/LUNG: dec breath sounds at bases   HEART: S1 S2  regular; no murmurs, gallops or rubs  ABDOMEN: Soft, Nontender, Nondistended; Bowel sounds present  EXTREMITIES: no cyanosis; no edema; no calf tenderness  SKIN: warm and dry; no rash  NERVOUS SYSTEM:  Awake and alert;   _________________________________________________  LABS:                        7.7    4.59  )-----------( 86       ( 04 Aug 2023 07:58 )             25.1     08-04    142  |  100  |  88<H>  ----------------------------<  81  3.9   |  23  |  15.40<H>    Ca    8.4      04 Aug 2023 07:58        Urinalysis Basic - ( 04 Aug 2023 07:58 )    Color: x / Appearance: x / SG: x / pH: x  Gluc: 81 mg/dL / Ketone: x  / Bili: x / Urobili: x   Blood: x / Protein: x / Nitrite: x   Leuk Esterase: x / RBC: x / WBC x   Sq Epi: x / Non Sq Epi: x / Bacteria: x      CAPILLARY BLOOD GLUCOSE      POCT Blood Glucose.: 84 mg/dL (04 Aug 2023 08:10)  POCT Blood Glucose.: 89 mg/dL (03 Aug 2023 21:08)  POCT Blood Glucose.: 82 mg/dL (03 Aug 2023 16:36)  POCT Blood Glucose.: 100 mg/dL (03 Aug 2023 11:41)        RADIOLOGY & ADDITIONAL TESTS:  ACC: [id number] EXAM:  NM HEPATOBILIARY IMG W RX   ORDERED BY: SHAKIR MAC     PROCEDURE DATE:  07/31/2023          INTERPRETATION:  CLINICAL INFORMATION: 62-year-old man with RIGHT UPPER   quadrant pain referred for evaluation of acute cholecystitis.    DURATION of DYNAMIC SERIES: 45 minutes  RADIOPHARMACEUTICAL: 3.2 mCi Tc-99m-Mebrofenin, I.V.    TECHNIQUE: Dynamic imaging of the anterior abdomen was performed   following radiopharmaceutical injection. Patient declined static images.    COMPARISON: None    OTHER STUDIES USED FOR CORRELATION: CT abdomen/pelvis 7/30/2023    FINDINGS: There is prompt, homogeneous uptake of radiopharmaceutical by   the hepatocytes. Activity is seen in the gallbladder at approximately 15   minutes. Patient declined further imaging before bowel was visualized.   There is fair clearance of activity from the liver by the end of the   obtained images..    IMPRESSION: Patient declined to complete the full exam and bowel was not   visualized on the available images. Otherwise unremarkable exam with no   evidence of acute cholecystitis or biliary obstruction.        --- End of Report ---            JD TUCKER MD; Attending Radiologist  This document has been electronically signed. Jul 31 2023 10:59AM    ACC: [id number] EXAM:  CT ABDOMEN AND PELVIS   ORDERED BY: ALEXANDER MATIAS     PROCEDURE DATE:  07/30/2023          INTERPRETATION:  CLINICAL INFORMATION: Worsening nausea and vomiting with   dark colored emesis.    COMPARISON: 7/26/2023    CONTRAST/COMPLICATIONS:  IV Contrast: NONE  Oral Contrast: NONE  Complications: None reported at time of study completion    PROCEDURE:  CT of the Abdomen and Pelvis was performed.  Sagittal and coronal reformats were performed.    FINDINGS: The examination is limited by lack of IV contrast.  LOWER CHEST: Small bilateral pleural effusions. Small bilateral   atelectasis. Nonspecific groundglass densities in the right lower lung   zone; clinical correlation with pneumonia is suggested. Cardiomegaly and   mild pericardial effusion, unchanged.    LIVER: Within normal limits.  BILE DUCTS: Dilated common bile duct again noted.  GALLBLADDER: Small gallstones. Nonspecific trace pericholecystic fluid.  SPLEEN: Within normal limits.  PANCREAS: Dilated main pancreaticduct is again noted.  ADRENALS: The right adrenal appears unremarkable. Nonspecific left   adrenal thickening.  KIDNEYS/URETERS: Within normal limits except for a few small hypodense   lesions in the kidneys bilaterally, representing probable cysts.    BLADDER: Underdistended.  REPRODUCTIVE ORGANS: The prostate and seminal vesicles appear grossly   unremarkable.    BOWEL: No bowel obstruction. Appendix unremarkable. Nonspecific mild   distal esophageal mural thickening.  PERITONEUM: Small ascites. No free air.  VESSELS: Calcified atherosclerotic disease.  RETROPERITONEUM/LYMPH NODES: No lymphadenopathy.  ABDOMINAL WALL: Anasarca again noted.  BONES: No acute CT findings.    IMPRESSION: Small gallstones. Nonspecific trace pericholecystic fluid..   If there is a clinical suspicion for acute cholecystitis, gallbladder   ultrasound/HIDA scan may be pursued for further evaluation.    Stable dilated common bile duct and main pancreatic duct. Please see   previous MRCP dated 2/23/2023.    No bowel obstruction. Appendix unremarkable. Nonspecific mild distal   esophageal mural thickening.    Small ascites.    Anasarca.    Small bilateral pleural effusions. Small bilateral atelectasis.   Nonspecific groundglass densities in the right lower lungzone; clinical   correlation with pneumonia is suggested.    Cardiomegaly and mild pericardial effusion, unchanged.    --- End of Report ---            ROSALBA GREEN MD; Attending Radiologist  This document has been electronically signed. Jul 30 2023  2:23PM      Imaging  Reviewed:  YES/NO    Consultant(s) Notes Reviewed:   YES/ No      Plan of care was discussed with patient and /or primary care giver; all questions and concerns were addressed

## 2023-08-04 NOTE — PROGRESS NOTE ADULT - PROBLEM SELECTOR PLAN 1
continues to only attend HD sporadically and for brief periods, tends to go for a partial treatment about once per week  -clinical status remains poor with worsening fluid overload, waxing/waning mental status, periods of agitation/restlessness  -much education regarding continuation of HD as prescribed vs transition to comfort care/cessation of HD - pt remains unable to verbalize his wishes continues to state he wants treatment but will refuse when time for procedure  -suspect strong element of existential angst regarding the overall prognosis and disease progression due to noncompliance, much support provided    -GOC discussions with pt and HCP/brother Georgi King ongoing, pt's brother initially receptive to comfort care transition/DNR but when informed that the pt's mental status worse today he waffled on the previous plan for transition to comfort care and adamantly refusing hospice and DNR stating "do everything to save my brother." Much support provide. Again reviewed the pt's refusal of treatment is the reason his prognosis is poor and that it is unlikely for the situation to improve due to his ongoing refusals. EOL issues again broached but Georgi verbalized being too emotional to have that discussion. Palliative care will follow.

## 2023-08-04 NOTE — PROGRESS NOTE ADULT - ASSESSMENT
62 year old male from [facility], walks with RW, with PMH of ESRD on HD (TTS), BKA- L w/prosthetic leg, DM, Left eye blindness, CHF, CAD, HTN, HLD, osteoporosis, bronchitis, current smoker, and anemia who presents with complaint of missed dialysis and does not make any urine. Patient states he was having shortness of breath. Patient admitted to telemetry for Acute Hyperkalemia 2/2 missed dialysis found to have pulmonary edema and BNP [id number]. Cardiology and nephro following.     Additionally pt had coffee ground emesis and abdominal pain, repeat CT A/P was negative for small bowel obstruction. But noted for cholelithiasis and trace pericholecystic fluid. Surgery following, concern for possible acute cholecystitis, HIDA scan was negative for acute cholecystitis. Additionally GI was consulted for possible endoscopy due to recent anemia and coffee ground emesis, no additional intervention at this time.     Hospital course complicated by Acute Hypoxic Respiratory Failure 2/2 worsening CHF. Patient was a rapid response due to hypoxia of 70-80% on room air, now requiring 4L oxygen via nasal cannula. Repeat CXR showing worsening pulmonary edema. Pt also found to have small bilateral pleural effusions, bilateral atelectasis and possible aspiration pneumonia, patient was started on cefepime and flagyl, ID Dr. Newby was consulted.    Patient for dialysis today 8/1, continues to have intermittent nausea/vomiting. Brother, Georgi King updated and aware of plan of care.     Patient is refusing blood work and meds again, HIDA negative for cholecystitis, cardiology consult noted, CHF likely 2/2 fluid overload after missing dialysis op.    Palliative consulted, patient refused blood work again this AM, will re consult  dr Linder as patient is unstable about decision making regarding his treatment. Patient brother is updated daily about patient condition and refusals.  Patient is scheduled for dialysis today, will obtain CBC and BNP. Last night US guided IV was inserted by on chanda l ICU team, but patient removed IV at 9PM 2 hours after.    08/04- pt ia refusing blood work again this morning. Dialysis yesterday was cancelled as patient refused. Becoming more lethargic today. Zoloft increased to 200mg qd as per  recommendation.   Patient brother updated on patient non compliance. Jacoby rodriguez is scheduled for today to discuss patient treatment options.

## 2023-08-04 NOTE — PROGRESS NOTE ADULT - PROBLEM SELECTOR PLAN 2
echo from 2/2023 shows EF 45-50%, and grade 1 DD  *** Compared with echocardiogram report of 2/24/2023, LVEF  has improved on current study.  continue lasix 80 mg daily   likely due to missing dialysis outpatient  continue HD  Pt refusing dialysis, refusing blood drawing, refusing IV lock  cardiology dr. Still

## 2023-08-04 NOTE — PROGRESS NOTE ADULT - SUBJECTIVE AND OBJECTIVE BOX
Time of Visit:  Patient seen and examined.     MEDICATIONS  (STANDING):  albuterol/ipratropium for Nebulization 3 milliLiter(s) Nebulizer every 6 hours  amLODIPine   Tablet 10 milliGRAM(s) Oral daily  aspirin enteric coated 81 milliGRAM(s) Oral daily  atorvastatin 40 milliGRAM(s) Oral at bedtime  budesonide 160 MICROgram(s)/formoterol 4.5 MICROgram(s) Inhaler 2 Puff(s) Inhalation two times a day  cefepime   IVPB 1000 milliGRAM(s) IV Intermittent every 24 hours  chlorhexidine 2% Cloths 1 Application(s) Topical daily  dextrose 5%. 1000 milliLiter(s) (50 mL/Hr) IV Continuous <Continuous>  dextrose 5%. 1000 milliLiter(s) (100 mL/Hr) IV Continuous <Continuous>  dextrose 50% Injectable 25 Gram(s) IV Push once  dextrose 50% Injectable 12.5 Gram(s) IV Push once  dextrose 50% Injectable 25 Gram(s) IV Push once  epoetin beverley (PROCRIT) Injectable 09248 Unit(s) IV Push <User Schedule>  folic acid 1 milliGRAM(s) Oral daily  furosemide   Injectable 40 milliGRAM(s) IV Push daily  glucagon  Injectable 1 milliGRAM(s) IntraMuscular once  hydrALAZINE Injectable 5 milliGRAM(s) IV Push three times a day  insulin glargine Injectable (LANTUS) 4 Unit(s) SubCutaneous at bedtime  latanoprost 0.005% Ophthalmic Solution 1 Drop(s) Both EYES at bedtime  levothyroxine 25 MICROGram(s) Oral daily  LORazepam     Tablet 0.5 milliGRAM(s) Oral <User Schedule>  metoprolol tartrate Injectable 5 milliGRAM(s) IV Push every 12 hours  metroNIDAZOLE  IVPB 500 milliGRAM(s) IV Intermittent every 8 hours  pantoprazole  Injectable 40 milliGRAM(s) IV Push every 12 hours  sertraline 200 milliGRAM(s) Oral daily  sevelamer carbonate 800 milliGRAM(s) Oral three times a day with meals  sucralfate 1 Gram(s) Oral four times a day      MEDICATIONS  (PRN):  dextrose Oral Gel 15 Gram(s) Oral once PRN Blood Glucose LESS THAN 70 milliGRAM(s)/deciliter  haloperidol    Injectable 0.5 milliGRAM(s) IntraMuscular every 8 hours PRN Agitation  LORazepam   Injectable 0.5 milliGRAM(s) IV Push every 4 hours PRN agitation and nausea  ondansetron   Disintegrating Tablet 4 milliGRAM(s) Oral two times a day PRN Nausea and/or Vomiting  ondansetron Injectable 4 milliGRAM(s) IV Push every 6 hours PRN Nausea and/or Vomiting       Medications up to date at time of exam.      PHYSICAL EXAMINATION:  Patient has no new complaints.  GENERAL: The patient is a well-developed, well-nourished, in no apparent distress.     Vital Signs Last 24 Hrs  T(C): 36.5 (04 Aug 2023 16:21), Max: 36.6 (03 Aug 2023 20:00)  T(F): 97.7 (04 Aug 2023 16:21), Max: 97.9 (03 Aug 2023 20:00)  HR: 102 (04 Aug 2023 16:21) (96 - 102)  BP: 165/98 (04 Aug 2023 16:21) (165/98 - 180/78)  BP(mean): 108 (04 Aug 2023 04:39) (108 - 108)  RR: 20 (04 Aug 2023 16:21) (16 - 20)  SpO2: 98% (04 Aug 2023 14:07) (92% - 99%)    Parameters below as of 04 Aug 2023 16:21  Patient On (Oxygen Delivery Method): nasal cannula  O2 Flow (L/min): 4     (if applicable)    Chest Tube (if applicable)    HEENT: Head is normocephalic and atraumatic. Extraocular muscles are intact. Mucous membranes are moist.     NECK: Supple, no palpable adenopathy.    LUNGS: Clear to auscultation, no wheezing, rales, or rhonchi.    HEART: Regular rate and rhythm without murmur.    ABDOMEN: Soft, nontender, and nondistended.  No hepatosplenomegaly is noted.    : No painful voiding, no pelvic pain    EXTREMITIES: Without any cyanosis, clubbing, rash, lesions or edema.    NEUROLOGIC: Awake, alert, oriented, grossly intact    SKIN: Warm, dry, good turgor.      LABS:                        7.7    4.59  )-----------( 86       ( 04 Aug 2023 07:58 )             25.1     08-04    142  |  100  |  88<H>  ----------------------------<  81  3.9   |  23  |  15.40<H>    Ca    8.4      04 Aug 2023 07:58        Urinalysis Basic - ( 04 Aug 2023 07:58 )    Color: x / Appearance: x / SG: x / pH: x  Gluc: 81 mg/dL / Ketone: x  / Bili: x / Urobili: x   Blood: x / Protein: x / Nitrite: x   Leuk Esterase: x / RBC: x / WBC x   Sq Epi: x / Non Sq Epi: x / Bacteria: x                      MICROBIOLOGY: (if applicable)    RADIOLOGY & ADDITIONAL STUDIES:  EKG:   CXR:  ECHO:    IMPRESSION: 62y Male 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      Osteoarthritis      Vision loss of left eye  blind      ESRD on hemodialysis  T/Th/S      Cataract  both eyes - hx of sx done      BPH (benign prostatic hyperplasia)      UTI (urinary tract infection)  hx of      Bladder mass  hx of      H/O hematuria      Osteoporosis      Vision loss of right eye  decreased      Depression      Chronic GERD      Osteomyelitis of vertebra      CHF (congestive heart failure)      Below knee amputation status, left  2012- pt is wearing prostesis      History of right cataract extraction      History of left cataract extraction      S/P arteriovenous (AV) fistula creation  right arm brachiocephalic arteriovenous fistula on 11/08/2018      H/O hematuria  s/p bladder bx and fulguration 2/25/2020      H/O transurethral destruction of bladder lesion  2020      History of excision of mass  back mass on 03/31/2021       p/w           RECOMMENDATIONS:   Time of Visit:  Patient seen and examined. pat vomiting and spitting in room . 1:1 supervision     MEDICATIONS  (STANDING):  albuterol/ipratropium for Nebulization 3 milliLiter(s) Nebulizer every 6 hours  amLODIPine   Tablet 10 milliGRAM(s) Oral daily  aspirin enteric coated 81 milliGRAM(s) Oral daily  atorvastatin 40 milliGRAM(s) Oral at bedtime  budesonide 160 MICROgram(s)/formoterol 4.5 MICROgram(s) Inhaler 2 Puff(s) Inhalation two times a day  cefepime   IVPB 1000 milliGRAM(s) IV Intermittent every 24 hours  chlorhexidine 2% Cloths 1 Application(s) Topical daily  dextrose 5%. 1000 milliLiter(s) (50 mL/Hr) IV Continuous <Continuous>  dextrose 5%. 1000 milliLiter(s) (100 mL/Hr) IV Continuous <Continuous>  dextrose 50% Injectable 25 Gram(s) IV Push once  dextrose 50% Injectable 12.5 Gram(s) IV Push once  dextrose 50% Injectable 25 Gram(s) IV Push once  epoetin beverley (PROCRIT) Injectable 31636 Unit(s) IV Push <User Schedule>  folic acid 1 milliGRAM(s) Oral daily  furosemide   Injectable 40 milliGRAM(s) IV Push daily  glucagon  Injectable 1 milliGRAM(s) IntraMuscular once  hydrALAZINE Injectable 5 milliGRAM(s) IV Push three times a day  insulin glargine Injectable (LANTUS) 4 Unit(s) SubCutaneous at bedtime  latanoprost 0.005% Ophthalmic Solution 1 Drop(s) Both EYES at bedtime  levothyroxine 25 MICROGram(s) Oral daily  LORazepam     Tablet 0.5 milliGRAM(s) Oral <User Schedule>  metoprolol tartrate Injectable 5 milliGRAM(s) IV Push every 12 hours  metroNIDAZOLE  IVPB 500 milliGRAM(s) IV Intermittent every 8 hours  pantoprazole  Injectable 40 milliGRAM(s) IV Push every 12 hours  sertraline 200 milliGRAM(s) Oral daily  sevelamer carbonate 800 milliGRAM(s) Oral three times a day with meals  sucralfate 1 Gram(s) Oral four times a day      MEDICATIONS  (PRN):  dextrose Oral Gel 15 Gram(s) Oral once PRN Blood Glucose LESS THAN 70 milliGRAM(s)/deciliter  haloperidol    Injectable 0.5 milliGRAM(s) IntraMuscular every 8 hours PRN Agitation  LORazepam   Injectable 0.5 milliGRAM(s) IV Push every 4 hours PRN agitation and nausea  ondansetron   Disintegrating Tablet 4 milliGRAM(s) Oral two times a day PRN Nausea and/or Vomiting  ondansetron Injectable 4 milliGRAM(s) IV Push every 6 hours PRN Nausea and/or Vomiting       Medications up to date at time of exam.      PHYSICAL EXAMINATION:  Patient has no new complaints.  GENERAL: The patient is a well-developed, well-nourished, in no apparent distress.     Vital Signs Last 24 Hrs  T(C): 36.5 (04 Aug 2023 16:21), Max: 36.6 (03 Aug 2023 20:00)  T(F): 97.7 (04 Aug 2023 16:21), Max: 97.9 (03 Aug 2023 20:00)  HR: 102 (04 Aug 2023 16:21) (96 - 102)  BP: 165/98 (04 Aug 2023 16:21) (165/98 - 180/78)  BP(mean): 108 (04 Aug 2023 04:39) (108 - 108)  RR: 20 (04 Aug 2023 16:21) (16 - 20)  SpO2: 98% (04 Aug 2023 14:07) (92% - 99%)    Parameters below as of 04 Aug 2023 16:21  Patient On (Oxygen Delivery Method): nasal cannula  O2 Flow (L/min): 4     (if applicable)    Chest Tube (if applicable)    HEENT: Head is normocephalic and atraumatic. Extraocular muscles are intact. Mucous membranes are moist.     NECK: Supple, no palpable adenopathy.    LUNGS: Clear to auscultation, no wheezing, rales, or rhonchi.    HEART: Regular rate and rhythm without murmur.    ABDOMEN: Soft, nontender, and nondistended.  No hepatosplenomegaly is noted.    : No painful voiding, no pelvic pain    EXTREMITIES: L BKA    NEUROLOGIC: Awake, alert, oriented, grossly intact    SKIN: Warm, dry, good turgor.      LABS:                        7.7    4.59  )-----------( 86       ( 04 Aug 2023 07:58 )             25.1     08-04    142  |  100  |  88<H>  ----------------------------<  81  3.9   |  23  |  15.40<H>    Ca    8.4      04 Aug 2023 07:58        Urinalysis Basic - ( 04 Aug 2023 07:58 )    Color: x / Appearance: x / SG: x / pH: x  Gluc: 81 mg/dL / Ketone: x  / Bili: x / Urobili: x   Blood: x / Protein: x / Nitrite: x   Leuk Esterase: x / RBC: x / WBC x   Sq Epi: x / Non Sq Epi: x / Bacteria: x                      MICROBIOLOGY: (if applicable)    RADIOLOGY & ADDITIONAL STUDIES:  EKG:   CXR:  ECHO:    IMPRESSION: 62y Male 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      Osteoarthritis      Vision loss of left eye  blind      ESRD on hemodialysis  T/Th/S      Cataract  both eyes - hx of sx done      BPH (benign prostatic hyperplasia)      UTI (urinary tract infection)  hx of      Bladder mass  hx of      H/O hematuria      Osteoporosis      Vision loss of right eye  decreased      Depression      Chronic GERD      Osteomyelitis of vertebra      CHF (congestive heart failure)      Below knee amputation status, left  2012- pt is wearing prostesis      History of right cataract extraction      History of left cataract extraction      S/P arteriovenous (AV) fistula creation  right arm brachiocephalic arteriovenous fistula on 11/08/2018      H/O hematuria  s/p bladder bx and fulguration 2/25/2020      H/O transurethral destruction of bladder lesion  2020      History of excision of mass  back mass on 03/31/2021       p/w       Impression: This is a 61 Y/O Male from [facility] with ESRD on HD ( T-Th-Sat ). Current smoker . Presented to ED due to Missed Dialysis, last HD 07-22-23 . Per Patient stated that he often missed HD sessions due to Mild left leg pain and right leg swelling . S/p RRT for SOB with Hypoxia due to Acute Hypoxic Respiratory Failure secondary to Pulmonary edema/ Fluid overload due to Missed Dialysis . CT Abdomen with Small Bilateral Pleural Effusions, Rt Lower Lung with groundglass opacity. No bowel obstruction. Small Gall Stone.   Vomiting due to diabetic gastroparesis vs acute cholecystitis  unlikely SBO . Pat refused dialysis and blood work today      Suggestions:  - O2 supp as needed to maintain sat >90%  - Ramon salinas noted    - Advise pat to accept dialysis treatment   - Duoneb via nebulization Q 6 Hours.  - Asp precaution   - Consider Reglan 10 mg iv q6h to increase GI motility   - Zofran for n/v  - On Cefepime 1 Gm IVPB Daily.

## 2023-08-04 NOTE — PROGRESS NOTE ADULT - CONVERSATION DETAILS
Met with the pt at the bedside to discuss the GOC with Dr. Rodgers. Attempted to engage pt in discussion of his wishes moving forward due to the fact that he continues to refuse medical treatments. He verbally agreed for HD yesterday and was brought to HD but refused treatment on arrival despite much encouragement.     He demonstrated poor insight into the situation today, and was unable to fully participate in the discussion. He responded minimally to questions regarding his wishes for continued medical interventions vs transition to comfort care. He was unable to verbalize awareness of his clinical condition and the options that are available to him. Much time spent empathetic listening and exploring his wishes and emotional support for existential issues and education regarding his disease trajectory and dying process. He did not engage with this discussion. Met with the pt at the bedside to discuss the GOC with Dr. Rodgers. Attempted to engage pt in discussion of his wishes moving forward due to the fact that he continues to refuse medical treatments. He verbally agreed for HD yesterday and was brought to HD but refused treatment on arrival despite much encouragement.     He demonstrated poor insight into the situation today, and was unable to fully participate in the discussion. He responded minimally to questions regarding his wishes for continued medical interventions vs transition to comfort care. He was unable to verbalize awareness of his clinical condition and the options that are available to him. Much time spent empathetic listening and exploring his wishes and emotional support for existential issues and education regarding his disease trajectory and dying process. He did not engage with this discussion.    There was a plan for a family meeting with the pt's brother but he did not answer the call and LMOM. Call placed later to the pt's brother Georgi King to discuss the above updates regarding decline in mental status to discuss the GOC with regard with transition to comfort care/hospice and address the code status. Georgi became very emotional with the news that his brother is declining and verbalized strong opposition to DNR and comfort care despite our prior converstations/anticipatory guidance regarding his trajectory given his refusals.    Further discussions took place but Georgi stated he is not able to make any decisions until he visits with his brother on Sunday.    Pt stated that he wishes to speak with his fiance Tammie Beckham and to contact her at [facility] where she is a resident. Call placed to [facility] [phone] and obtained her room number/extension [phone], [facility] staff called up to her room but she declined to come to the phone and stated she was sleeping. Met with the pt at the bedside to discuss the GOC with Dr. Rodgers. Attempted to engage pt in discussion of his wishes moving forward due to the fact that he continues to refuse medical treatments. He verbally agreed for HD yesterday and was brought to HD but refused treatment on arrival despite much encouragement.     He demonstrated poor insight into the situation today, and was unable to fully participate in the discussion. He responded minimally to questions regarding his wishes for continued medical interventions vs transition to comfort care. He was unable to verbalize awareness of his clinical condition and the options that are available to him. Much time spent empathetic listening and exploring his wishes and emotional support for existential issues and education regarding his disease trajectory and dying process. He did not engage with this discussion.    There was a plan for a family meeting with the pt's brother but he did not answer the call and LMOM. Call placed later to the pt's brother Georgi King to discuss the above updates regarding decline in mental status to discuss the GOC with regard with transition to comfort care/hospice and address the code status. Georgi became very emotional with the news that his brother is declining and verbalized strong opposition to DNR and comfort care despite our prior converstations/anticipatory guidance regarding his trajectory given his refusals.     Further GOC discussions took place with primary team and nephrologist regarding hospice and DNR but Georgi subsequently stated he is not able to make any decisions until he visits with his brother on Sunday.    Pt stated that he wishes to speak with his fiance Tammie Beckham and to contact her at [facility] where she is a resident. Call placed to [facility] [phone] and obtained her room number/extension 204, [facility] staff called up to her room but she declined to come to the phone and stated she was sleeping.

## 2023-08-05 NOTE — PROGRESS NOTE ADULT - ASSESSMENT
A/P   PT  WITH ESRD ON  HD    REFUSED  HD ON  THURS    WANTS TO BE DIALYZED TODAY    ON  AYAN  WITH HD    [facility] GOAL 2500CC  SPOKE  WITH PT AGAIN  TO  COMPLETE HIS  PRESCRIBED TIME  AND  NOT TO MISS HIS SESSIONS

## 2023-08-05 NOTE — PROGRESS NOTE ADULT - SUBJECTIVE AND OBJECTIVE BOX
Patient is a 62y old  Male who presents with a chief complaint of Missed dialysis (8/5/23      INTERVAL HPI/OVERNIGHT EVENTS:  pt seen and examined    MEDICATIONS  (STANDING):  albuterol/ipratropium for Nebulization 3 milliLiter(s) Nebulizer every 6 hours  amLODIPine   Tablet 10 milliGRAM(s) Oral daily  aspirin enteric coated 81 milliGRAM(s) Oral daily  atorvastatin 40 milliGRAM(s) Oral at bedtime  budesonide 160 MICROgram(s)/formoterol 4.5 MICROgram(s) Inhaler 2 Puff(s) Inhalation two times a day  chlorhexidine 2% Cloths 1 Application(s) Topical daily  dextrose 5%. 1000 milliLiter(s) (100 mL/Hr) IV Continuous <Continuous>  dextrose 5%. 1000 milliLiter(s) (50 mL/Hr) IV Continuous <Continuous>  dextrose 50% Injectable 25 Gram(s) IV Push once  dextrose 50% Injectable 12.5 Gram(s) IV Push once  dextrose 50% Injectable 25 Gram(s) IV Push once  epoetin beverley (PROCRIT) Injectable 88881 Unit(s) IV Push <User Schedule>  folic acid 1 milliGRAM(s) Oral daily  furosemide   Injectable 40 milliGRAM(s) IV Push daily  glucagon  Injectable 1 milliGRAM(s) IntraMuscular once  hydrALAZINE Injectable 5 milliGRAM(s) IV Push three times a day  insulin glargine Injectable (LANTUS) 4 Unit(s) SubCutaneous at bedtime  latanoprost 0.005% Ophthalmic Solution 1 Drop(s) Both EYES at bedtime  levothyroxine 25 MICROGram(s) Oral daily  metoprolol tartrate Injectable 5 milliGRAM(s) IV Push every 12 hours  metroNIDAZOLE  IVPB 500 milliGRAM(s) IV Intermittent every 8 hours  pantoprazole  Injectable 40 milliGRAM(s) IV Push every 12 hours  sertraline 200 milliGRAM(s) Oral daily  sevelamer carbonate 800 milliGRAM(s) Oral three times a day with meals  sucralfate 1 Gram(s) Oral four times a day    MEDICATIONS  (PRN):  dextrose Oral Gel 15 Gram(s) Oral once PRN Blood Glucose LESS THAN 70 milliGRAM(s)/deciliter  haloperidol    Injectable 0.5 milliGRAM(s) IntraMuscular every 8 hours PRN Agitation  LORazepam   Injectable 0.5 milliGRAM(s) IV Push every 4 hours PRN agitation and nausea  ondansetron   Disintegrating Tablet 4 milliGRAM(s) Oral two times a day PRN Nausea and/or Vomiting  ondansetron Injectable 4 milliGRAM(s) IV Push every 6 hours PRN Nausea and/or Vomiting    __________________________________________________  REVIEW OF SYSTEMS:    CONSTITUTIONAL: No fever,   EYES: no acute visual disturbances  NECK: No pain or stiffness  RESPIRATORY: No cough; No shortness of breath  CARDIOVASCULAR: No chest pain, no palpitations  GASTROINTESTINAL: No pain. No nausea or vomiting; No diarrhea   NEUROLOGICAL: No headache or numbness, no tremors  MUSCULOSKELETAL: Generalized weakness, No joint pain, no muscle pain  GENITOURINARY: ESRD-HD, +dysuria, no frequency, no hesitancy  PSYCHIATRY: no depression , no anxiety  ALL OTHER  ROS negative      Vital Signs Last 24 Hrs  T(C): 36.9 (08-05-23 @ 13:59), Max: 37 (08-04-23 @ 20:16)  T(F): 98.5 (08-05-23 @ 13:59), Max: 98.6 (08-04-23 @ 20:16)  HR: 92 (08-05-23 @ 13:59) (87 - 98)  BP: 150/68 (08-05-23 @ 13:59) (150/68 - 166/86)  BP(mean): --  RR: 18 (08-05-23 @ 13:59) (18 - 20)  SpO2: 95% (08-05-23 @ 13:59) (92% - 98%)    O2 Flow (L/min): 4      ________________________________________________  PHYSICAL EXAM:  GENERAL: NAD  HEENT: Normocephalic;  conjunctivae and sclerae clear; moist mucous membranes;   NECK : supple  CHEST/LUNG: dec breath sounds at bases   HEART: S1 S2  regular; no murmurs, gallops or rubs  ABDOMEN: Soft, Nontender, Nondistended; Bowel sounds present  EXTREMITIES: no cyanosis; no edema; no calf tenderness  SKIN: warm and dry; no rash  NERVOUS SYSTEM:  Awake and alert;   _________________________________________________    LABS:  08-05    144  |  102  |  95<H>  ----------------------------<  109<H>  3.8   |  27  |  16.90<H>    Ca    8.1<L>      05 Aug 2023 12:21      Creatinine Trend: 16.90 <--, 15.40 <--, 16.10 <--, 13.80 <--, 11.30 <--                        7.2    3.30  )-----------( 86       ( 05 Aug 2023 12:21 )             23.0     Urine Studies:  Urinalysis Basic - ( 05 Aug 2023 12:21 )    Color:  / Appearance:  / SG:  / pH:   Gluc: 109 mg/dL / Ketone:   / Bili:  / Urobili:    Blood:  / Protein:  / Nitrite:    Leuk Esterase:  / RBC:  / WBC    Sq Epi:  / Non Sq Epi:  / Bacteria:                           RADIOLOGY & ADDITIONAL TESTS:  ACC: [id number] EXAM:  NM HEPATOBILIARY IMG W RX   ORDERED BY: SHAKIR MAC     PROCEDURE DATE:  07/31/2023          INTERPRETATION:  CLINICAL INFORMATION: 62-year-old man with RIGHT UPPER   quadrant pain referred for evaluation of acute cholecystitis.    DURATION of DYNAMIC SERIES: 45 minutes  RADIOPHARMACEUTICAL: 3.2 mCi Tc-99m-Mebrofenin, I.V.    TECHNIQUE: Dynamic imaging of the anterior abdomen was performed   following radiopharmaceutical injection. Patient declined static images.    COMPARISON: None    OTHER STUDIES USED FOR CORRELATION: CT abdomen/pelvis 7/30/2023    FINDINGS: There is prompt, homogeneous uptake of radiopharmaceutical by   the hepatocytes. Activity is seen in the gallbladder at approximately 15   minutes. Patient declined further imaging before bowel was visualized.   There is fair clearance of activity from the liver by the end of the   obtained images..    IMPRESSION: Patient declined to complete the full exam and bowel was not   visualized on the available images. Otherwise unremarkable exam with no   evidence of acute cholecystitis or biliary obstruction.        --- End of Report ---            JD TUCKER MD; Attending Radiologist  This document has been electronically signed. Jul 31 2023 10:59AM    ACC: [id number] EXAM:  CT ABDOMEN AND PELVIS   ORDERED BY: ALEXANDER MATIAS     PROCEDURE DATE:  07/30/2023          INTERPRETATION:  CLINICAL INFORMATION: Worsening nausea and vomiting with   dark colored emesis.    COMPARISON: 7/26/2023    CONTRAST/COMPLICATIONS:  IV Contrast: NONE  Oral Contrast: NONE  Complications: None reported at time of study completion    PROCEDURE:  CT of the Abdomen and Pelvis was performed.  Sagittal and coronal reformats were performed.    FINDINGS: The examination is limited by lack of IV contrast.  LOWER CHEST: Small bilateral pleural effusions. Small bilateral   atelectasis. Nonspecific groundglass densities in the right lower lung   zone; clinical correlation with pneumonia is suggested. Cardiomegaly and   mild pericardial effusion, unchanged.    LIVER: Within normal limits.  BILE DUCTS: Dilated common bile duct again noted.  GALLBLADDER: Small gallstones. Nonspecific trace pericholecystic fluid.  SPLEEN: Within normal limits.  PANCREAS: Dilated main pancreaticduct is again noted.  ADRENALS: The right adrenal appears unremarkable. Nonspecific left   adrenal thickening.  KIDNEYS/URETERS: Within normal limits except for a few small hypodense   lesions in the kidneys bilaterally, representing probable cysts.    BLADDER: Underdistended.  REPRODUCTIVE ORGANS: The prostate and seminal vesicles appear grossly   unremarkable.    BOWEL: No bowel obstruction. Appendix unremarkable. Nonspecific mild   distal esophageal mural thickening.  PERITONEUM: Small ascites. No free air.  VESSELS: Calcified atherosclerotic disease.  RETROPERITONEUM/LYMPH NODES: No lymphadenopathy.  ABDOMINAL WALL: Anasarca again noted.  BONES: No acute CT findings.    IMPRESSION: Small gallstones. Nonspecific trace pericholecystic fluid..   If there is a clinical suspicion for acute cholecystitis, gallbladder   ultrasound/HIDA scan may be pursued for further evaluation.    Stable dilated common bile duct and main pancreatic duct. Please see   previous MRCP dated 2/23/2023.    No bowel obstruction. Appendix unremarkable. Nonspecific mild distal   esophageal mural thickening.    Small ascites.    Anasarca.    Small bilateral pleural effusions. Small bilateral atelectasis.   Nonspecific groundglass densities in the right lower lungzone; clinical   correlation with pneumonia is suggested.    Cardiomegaly and mild pericardial effusion, unchanged.    --- End of Report ---            ROSALBA GREEN MD; Attending Radiologist  This document has been electronically signed. Jul 30 2023  2:23PM      Imaging  Reviewed:  YES/NO    Consultant(s) Notes Reviewed:   YES/ No      Plan of care was discussed with patient and /or primary care giver; all questions and concerns were addressed

## 2023-08-05 NOTE — PROGRESS NOTE ADULT - SUBJECTIVE AND OBJECTIVE BOX
Time of Visit:  Patient seen and examined. lying in bed     MEDICATIONS  (STANDING):  albuterol/ipratropium for Nebulization 3 milliLiter(s) Nebulizer every 6 hours  amLODIPine   Tablet 10 milliGRAM(s) Oral daily  aspirin enteric coated 81 milliGRAM(s) Oral daily  atorvastatin 40 milliGRAM(s) Oral at bedtime  budesonide 160 MICROgram(s)/formoterol 4.5 MICROgram(s) Inhaler 2 Puff(s) Inhalation two times a day  cefepime   IVPB 1000 milliGRAM(s) IV Intermittent every 24 hours  chlorhexidine 2% Cloths 1 Application(s) Topical daily  dextrose 5%. 1000 milliLiter(s) (100 mL/Hr) IV Continuous <Continuous>  dextrose 5%. 1000 milliLiter(s) (50 mL/Hr) IV Continuous <Continuous>  dextrose 50% Injectable 25 Gram(s) IV Push once  dextrose 50% Injectable 12.5 Gram(s) IV Push once  dextrose 50% Injectable 25 Gram(s) IV Push once  epoetin beverley (PROCRIT) Injectable 86505 Unit(s) IV Push <User Schedule>  folic acid 1 milliGRAM(s) Oral daily  furosemide   Injectable 40 milliGRAM(s) IV Push daily  glucagon  Injectable 1 milliGRAM(s) IntraMuscular once  hydrALAZINE Injectable 5 milliGRAM(s) IV Push three times a day  insulin glargine Injectable (LANTUS) 4 Unit(s) SubCutaneous at bedtime  latanoprost 0.005% Ophthalmic Solution 1 Drop(s) Both EYES at bedtime  levothyroxine 25 MICROGram(s) Oral daily  metoprolol tartrate Injectable 5 milliGRAM(s) IV Push every 12 hours  metroNIDAZOLE  IVPB 500 milliGRAM(s) IV Intermittent every 8 hours  pantoprazole  Injectable 40 milliGRAM(s) IV Push every 12 hours  sertraline 200 milliGRAM(s) Oral daily  sevelamer carbonate 800 milliGRAM(s) Oral three times a day with meals  sucralfate 1 Gram(s) Oral four times a day      MEDICATIONS  (PRN):  dextrose Oral Gel 15 Gram(s) Oral once PRN Blood Glucose LESS THAN 70 milliGRAM(s)/deciliter  haloperidol    Injectable 0.5 milliGRAM(s) IntraMuscular every 8 hours PRN Agitation  LORazepam   Injectable 0.5 milliGRAM(s) IV Push every 4 hours PRN agitation and nausea  ondansetron   Disintegrating Tablet 4 milliGRAM(s) Oral two times a day PRN Nausea and/or Vomiting  ondansetron Injectable 4 milliGRAM(s) IV Push every 6 hours PRN Nausea and/or Vomiting       Medications up to date at time of exam.      PHYSICAL EXAMINATION:  Patient has no new complaints.  GENERAL: The patient is a well-developed, well-nourished, in no apparent distress.     Vital Signs Last 24 Hrs  T(C): 36.9 (05 Aug 2023 13:59), Max: 37 (04 Aug 2023 20:16)  T(F): 98.5 (05 Aug 2023 13:59), Max: 98.6 (04 Aug 2023 20:16)  HR: 92 (05 Aug 2023 13:59) (87 - 98)  BP: 150/68 (05 Aug 2023 13:59) (150/68 - 166/86)  BP(mean): --  RR: 18 (05 Aug 2023 13:59) (18 - 20)  SpO2: 95% (05 Aug 2023 13:59) (92% - 98%)    Parameters below as of 05 Aug 2023 13:59  Patient On (Oxygen Delivery Method): nasal cannula  O2 Flow (L/min): 3     (if applicable)    Chest Tube (if applicable)    HEENT: Head is normocephalic and atraumatic. Extraocular muscles are intact. Mucous membranes are moist.     NECK: Supple, no palpable adenopathy.    LUNGS: Clear to auscultation, no wheezing, rales, or rhonchi.    HEART: Regular rate and rhythm without murmur.    ABDOMEN: Soft, nontender, and nondistended.  No hepatosplenomegaly is noted.    : No painful voiding, no pelvic pain    EXTREMITIES: Without any cyanosis, clubbing, rash, lesions or edema. Left BKA    NEUROLOGIC: Awake    SKIN: Warm, dry, good turgor.      LABS:                        7.2    3.30  )-----------( 86       ( 05 Aug 2023 12:21 )             23.0     08-05    144  |  102  |  95<H>  ----------------------------<  109<H>  3.8   |  27  |  16.90<H>    Ca    8.1<L>      05 Aug 2023 12:21        Urinalysis Basic - ( 05 Aug 2023 12:21 )    Color: x / Appearance: x / SG: x / pH: x  Gluc: 109 mg/dL / Ketone: x  / Bili: x / Urobili: x   Blood: x / Protein: x / Nitrite: x   Leuk Esterase: x / RBC: x / WBC x   Sq Epi: x / Non Sq Epi: x / Bacteria: x                      MICROBIOLOGY: (if applicable)    RADIOLOGY & ADDITIONAL STUDIES:  EKG:   CXR:  ECHO:    IMPRESSION: 62y Male 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      Osteoarthritis      Vision loss of left eye  blind      ESRD on hemodialysis  T/Th/S      Cataract  both eyes - hx of sx done      BPH (benign prostatic hyperplasia)      UTI (urinary tract infection)  hx of      Bladder mass  hx of      H/O hematuria      Osteoporosis      Vision loss of right eye  decreased      Depression      Chronic GERD      Osteomyelitis of vertebra      CHF (congestive heart failure)      Below knee amputation status, left  2012- pt is wearing prostesis      History of right cataract extraction      History of left cataract extraction      S/P arteriovenous (AV) fistula creation  right arm brachiocephalic arteriovenous fistula on 11/08/2018      H/O hematuria  s/p bladder bx and fulguration 2/25/2020      H/O transurethral destruction of bladder lesion  2020      History of excision of mass  back mass on 03/31/2021       p/w         Impression: This is a 61 Y/O Male from [facility] with ESRD on HD ( T-Th-Sat ). Current smoker . Presented to ED due to Missed Dialysis, last HD 07-22-23 . Per Patient stated that he often missed HD sessions due to Mild left leg pain and right leg swelling . S/p RRT for SOB with Hypoxia due to Acute Hypoxic Respiratory Failure secondary to Pulmonary edema/ Fluid overload due to Missed Dialysis . CT Abdomen with Small Bilateral Pleural Effusions, Rt Lower Lung with groundglass opacity. No bowel obstruction. Small Gall Stone.   Vomiting due to diabetic gastroparesis vs acute cholecystitis  unlikely SBO . Pat refused dialysis and blood work today      Suggestions:    - O2 supp as needed to maintain sat >90%  - Advise pat to accept dialysis treatment   - Duoneb via nebulization Q 6 Hours.  - Asp precaution   - Consider Reglan 10 mg iv q6h to increase GI motility   - Zofran for n/v  - Antibx

## 2023-08-05 NOTE — PROGRESS NOTE ADULT - SUBJECTIVE AND OBJECTIVE BOX
Patient is a 62y Male with  ESRD ON HD     HAD  REFUSED  HD ON   THURS    TODAY   WANTS TO BE DIALYZED,  THOUGH  DOESNT  WANT TO  ANSWER QUESTIONS AND  WHEN  ASKED  HOW IS  HE FEELING  GETS VERY  ANGRY     NO  ACUTE EVENTS OVERNIGHT    No Known Drug Allergies  fish (Rash)  liver (Anaphylaxis)    Hospital Medications:   MEDICATIONS  (STANDING):  albuterol/ipratropium for Nebulization 3 milliLiter(s) Nebulizer every 6 hours  amLODIPine   Tablet 10 milliGRAM(s) Oral daily  aspirin enteric coated 81 milliGRAM(s) Oral daily  atorvastatin 40 milliGRAM(s) Oral at bedtime  budesonide 160 MICROgram(s)/formoterol 4.5 MICROgram(s) Inhaler 2 Puff(s) Inhalation two times a day  cefepime   IVPB 1000 milliGRAM(s) IV Intermittent every 24 hours  chlorhexidine 2% Cloths 1 Application(s) Topical daily  dextrose 5%. 1000 milliLiter(s) (100 mL/Hr) IV Continuous <Continuous>  dextrose 5%. 1000 milliLiter(s) (50 mL/Hr) IV Continuous <Continuous>  dextrose 50% Injectable 25 Gram(s) IV Push once  dextrose 50% Injectable 12.5 Gram(s) IV Push once  dextrose 50% Injectable 25 Gram(s) IV Push once  epoetin beverley (PROCRIT) Injectable 00389 Unit(s) IV Push <User Schedule>  folic acid 1 milliGRAM(s) Oral daily  furosemide   Injectable 40 milliGRAM(s) IV Push daily  glucagon  Injectable 1 milliGRAM(s) IntraMuscular once  hydrALAZINE Injectable 5 milliGRAM(s) IV Push three times a day  insulin glargine Injectable (LANTUS) 4 Unit(s) SubCutaneous at bedtime  latanoprost 0.005% Ophthalmic Solution 1 Drop(s) Both EYES at bedtime  levothyroxine 25 MICROGram(s) Oral daily  metoprolol tartrate Injectable 5 milliGRAM(s) IV Push every 12 hours  metroNIDAZOLE  IVPB 500 milliGRAM(s) IV Intermittent every 8 hours  pantoprazole  Injectable 40 milliGRAM(s) IV Push every 12 hours  sertraline 200 milliGRAM(s) Oral daily  sevelamer carbonate 800 milliGRAM(s) Oral three times a day with meals  sucralfate 1 Gram(s) Oral four times a day    REVIEW OF SYSTEMS:  HAS NO   FEVER  CHILLS   NO   SOB  OR  COUGH   NO CH  PAIN  / PALPITATIONS   APPETITE IS GOOD, NO  N/V  OR  ABD  PAIN  MAKES LITTLE URINE      VITALS:  T(F): 97.5 (08-05-23 @ 04:50), Max: 98.6 (08-04-23 @ 20:16)  HR: 87 (08-05-23 @ 04:50)  BP: 159/70 (08-05-23 @ 04:50)  RR: 18 (08-05-23 @ 04:50)  SpO2: 98% (08-05-23 @ 04:50)  Wt(kg): --      PHYSICAL EXAM:  Constitutional: NAD  HEENT: CONJ  PALE  Neck: No JVD  Respiratory: HAS  CRACKLES  AT THE BASES  POST  Cardiovascular: S1, S2, RRR  Gastrointestinal: BS+, soft, NT/ND  Extremities:  No peripheral edema  R,  L  BKA  Neurological: A/O x 3,  :  No [place].   Vascular Access: AVF  R  UPPER ARM,  WORKING WELL  HAS  IV  LINE  L  UPPER ARM  LABS:  08-04    142  |  100  |  88<H>  ----------------------------<  81  3.9   |  23  |  15.40<H>    Ca    8.4      04 Aug 2023 07:58      Creatinine Trend: 15.40 <--, 16.10 <--, 13.80 <--, 11.30 <--                        7.7    4.59  )-----------( 86       ( 04 Aug 2023 07:58 )             25.1     Urine Studies:  Urinalysis Basic - ( 04 Aug 2023 07:58 )    Color:  / Appearance:  / SG:  / pH:   Gluc: 81 mg/dL / Ketone:   / Bili:  / Urobili:    Blood:  / Protein:  / Nitrite:    Leuk Esterase:  / RBC:  / WBC    Sq Epi:  / Non Sq Epi:  / Bacteria:         RADIOLOGY & ADDITIONAL STUDIES:

## 2023-08-05 NOTE — PROGRESS NOTE ADULT - ASSESSMENT
"Labor Progress Note    Subjective:  Patient feeling pressure in her vagina.     Objective:  /84   Temp 98  F (36.7  C) (Oral)   Resp 18   Ht 1.727 m (5' 8\")   Wt 90.7 kg (200 lb)   LMP 06/10/2018   SpO2 100%   BMI 30.41 kg/m     Gen: Appears uncomfortable  SVE: Traction applied to cook catheter, felt in vagina. Balloon deflated and removed. Cervix 3/40/-4. Bloody show seen.     FHT: Baseline 140, moderate variability, no accels, no decels  [place]: q8 min ctx    A/P:  37 year old  at 36w3d admitted for IOL pre-eclampsia with severe features.    # Pre-eclampsia  - continue magnesium sulfate for seizure proph  - BP: mild range BP, has not required any immediate acting antihypertensives other than initial nifedipine     # IOL  - s/p PV miso x3 and cook catheter. Will start Pitocin now.   - GBS neg  - Planning epidural     # FWB  - Category 1, non-reactive. Continue to monitor.   - S/p BMZ for fetal lung maturity.    Celina Ruvalcaba MD  Ob/Gyn PGY-2  19 4:41 PM     " 62 year old male from [facility], walks with RW, with PMH of ESRD on HD (TTS), BKA- L w/prosthetic leg, DM, Left eye blindness, CHF, CAD, HTN, HLD, osteoporosis, bronchitis, current smoker, and anemia who presents with complaint of missed dialysis and does not make any urine. Patient states he was having shortness of breath. Patient admitted to telemetry for Acute Hyperkalemia 2/2 missed dialysis found to have pulmonary edema and BNP [id number]. Cardiology and nephro following.     Additionally pt had coffee ground emesis and abdominal pain, repeat CT A/P was negative for small bowel obstruction. But noted for cholelithiasis and trace pericholecystic fluid. Surgery following, concern for possible acute cholecystitis, HIDA scan was negative for acute cholecystitis. Additionally GI was consulted for possible endoscopy due to recent anemia and coffee ground emesis, no additional intervention at this time.     Hospital course complicated by Acute Hypoxic Respiratory Failure 2/2 worsening CHF. Patient was a rapid response due to hypoxia of 70-80% on room air, now requiring 4L oxygen via nasal cannula. Repeat CXR showing worsening pulmonary edema. Pt also found to have small bilateral pleural effusions, bilateral atelectasis and possible aspiration pneumonia, patient was started on cefepime and flagyl, ID Dr. Newby was consulted.    Patient for dialysis today 8/1, continues to have intermittent nausea/vomiting. Brother, Georgi King updated and aware of plan of care.     Patient is refusing blood work and meds again, HIDA negative for cholecystitis, cardiology consult noted, CHF likely 2/2 fluid overload after missing dialysis op.    Palliative consulted, patient refused blood work again this AM, will re consult  dr Linder as patient is unstable about decision making regarding his treatment. Patient brother is updated daily about patient condition and refusals.  Patient is scheduled for dialysis today, will obtain CBC and BNP. Last night US guided IV was inserted by on chanda l ICU team, but patient removed IV at 9PM 2 hours after.    08/04- pt ia refusing blood work again this morning. Dialysis yesterday was cancelled as patient refused. Becoming more lethargic today. Zoloft increased to 200mg qd as per  recommendation.   Patient brother updated on patient non compliance. Jacoby rodriguez is scheduled for today to discuss patient treatment options.

## 2023-08-05 NOTE — PROGRESS NOTE ADULT - SUBJECTIVE AND OBJECTIVE BOX
DATE OF SERVICE: 08-05-23    No events overnight        albuterol/ipratropium for Nebulization 3 milliLiter(s) Nebulizer every 6 hours  amLODIPine   Tablet 10 milliGRAM(s) Oral daily  aspirin enteric coated 81 milliGRAM(s) Oral daily  atorvastatin 40 milliGRAM(s) Oral at bedtime  budesonide 160 MICROgram(s)/formoterol 4.5 MICROgram(s) Inhaler 2 Puff(s) Inhalation two times a day  cefepime   IVPB 1000 milliGRAM(s) IV Intermittent every 24 hours  chlorhexidine 2% Cloths 1 Application(s) Topical daily  dextrose 5%. 1000 milliLiter(s) IV Continuous <Continuous>  dextrose 5%. 1000 milliLiter(s) IV Continuous <Continuous>  dextrose 50% Injectable 25 Gram(s) IV Push once  dextrose 50% Injectable 12.5 Gram(s) IV Push once  dextrose 50% Injectable 25 Gram(s) IV Push once  dextrose 50% Injectable 25 Gram(s) IV Push once  dextrose Oral Gel 15 Gram(s) Oral once PRN  epoetin beverley (PROCRIT) Injectable 41919 Unit(s) IV Push <User Schedule>  folic acid 1 milliGRAM(s) Oral daily  furosemide   Injectable 40 milliGRAM(s) IV Push daily  glucagon  Injectable 1 milliGRAM(s) IntraMuscular once  haloperidol    Injectable 0.5 milliGRAM(s) IntraMuscular every 8 hours PRN  hydrALAZINE Injectable 5 milliGRAM(s) IV Push three times a day  insulin glargine Injectable (LANTUS) 4 Unit(s) SubCutaneous at bedtime  latanoprost 0.005% Ophthalmic Solution 1 Drop(s) Both EYES at bedtime  levothyroxine 25 MICROGram(s) Oral daily  LORazepam   Injectable 0.5 milliGRAM(s) IV Push every 4 hours PRN  metoprolol tartrate Injectable 5 milliGRAM(s) IV Push every 12 hours  metroNIDAZOLE  IVPB 500 milliGRAM(s) IV Intermittent every 8 hours  ondansetron   Disintegrating Tablet 4 milliGRAM(s) Oral two times a day PRN  ondansetron Injectable 4 milliGRAM(s) IV Push every 6 hours PRN  pantoprazole  Injectable 40 milliGRAM(s) IV Push every 12 hours  sertraline 200 milliGRAM(s) Oral daily  sevelamer carbonate 800 milliGRAM(s) Oral three times a day with meals  sucralfate 1 Gram(s) Oral four times a day                            7.7    4.59  )-----------( 86       ( 04 Aug 2023 07:58 )             25.1       Hemoglobin: 7.7 g/dL (08-04 @ 07:58)  Hemoglobin: 7.3 g/dL (08-01 @ 17:05)  Hemoglobin: 7.5 g/dL (07-31 @ 23:40)      08-04    142  |  100  |  88<H>  ----------------------------<  81  3.9   |  23  |  15.40<H>    Ca    8.4      04 Aug 2023 07:58      Creatinine Trend: 15.40<--, 16.10<--, 13.80<--, 11.30<--, 16.60<--, 15.60<--    COAGS:           T(C): 36.4 (08-05-23 @ 04:50), Max: 37 (08-04-23 @ 20:16)  HR: 87 (08-05-23 @ 04:50) (87 - 102)  BP: 159/70 (08-05-23 @ 04:50) (150/75 - 180/78)  RR: 18 (08-05-23 @ 04:50) (16 - 20)  SpO2: 98% (08-05-23 @ 04:50) (94% - 98%)  Wt(kg): --    I&O's Summary    HEENT:  (-)icterus (-)pallor  CV: N S1 S2 1/6 DIANA (+)2 Pulses B/l  Resp:  Clear to ausculatation B/L, normal effort  GI: (+) BS Soft, NT, ND  Lymph:  (-)Edema, (-)obvious lymphadenopathy  Skin: Warm to touch, Normal turgor  Psych: Appropriate mood and affect         ASSESSMENT/PLAN: 	62y Male  Mateus Murray, walks with RW, with PMH of ESRD on HD (TTS), BKA- L w/prosthetic leg, DM, Left eye blindness, CHF,, HTN, HLD, EF 45-50%, normal perfusion 4/23 osteoporosis, bronchitis, current smoker, and anemia who presents with complaint of missed dialysis.     # CHF  - Due to missed HD  - HD per renal    # Positive trop  - nothing to suggest ACS.  His trop has been higher in the past and demonstrated normal perfusion on Stress 4/23  - ECHO noted, normal LV fx severe pulm HTN, cont to keep net negative     # Noncompliance  - Behavioral health f/u  - refusing meds and blood tests     # Smoking  - Cessation stressed    # HTN  - Suspect BP will improve once euvolemic     # N/V  - Surgery f/u

## 2023-08-06 NOTE — PROGRESS NOTE ADULT - SUBJECTIVE AND OBJECTIVE BOX
DATE OF SERVICE: 08-06-23    No chest pain or sob            albuterol/ipratropium for Nebulization 3 milliLiter(s) Nebulizer every 6 hours  amLODIPine   Tablet 10 milliGRAM(s) Oral daily  aspirin enteric coated 81 milliGRAM(s) Oral daily  atorvastatin 40 milliGRAM(s) Oral at bedtime  budesonide 160 MICROgram(s)/formoterol 4.5 MICROgram(s) Inhaler 2 Puff(s) Inhalation two times a day  cefepime   IVPB 1000 milliGRAM(s) IV Intermittent every 24 hours  chlorhexidine 2% Cloths 1 Application(s) Topical daily  dextrose 5%. 1000 milliLiter(s) IV Continuous <Continuous>  dextrose 5%. 1000 milliLiter(s) IV Continuous <Continuous>  dextrose 50% Injectable 25 Gram(s) IV Push once  dextrose 50% Injectable 12.5 Gram(s) IV Push once  dextrose 50% Injectable 25 Gram(s) IV Push once  dextrose Oral Gel 15 Gram(s) Oral once PRN  epoetin beverley (PROCRIT) Injectable 19144 Unit(s) IV Push <User Schedule>  folic acid 1 milliGRAM(s) Oral daily  furosemide   Injectable 40 milliGRAM(s) IV Push daily  glucagon  Injectable 1 milliGRAM(s) IntraMuscular once  haloperidol    Injectable 0.5 milliGRAM(s) IntraMuscular every 8 hours PRN  hydrALAZINE Injectable 5 milliGRAM(s) IV Push three times a day  insulin glargine Injectable (LANTUS) 4 Unit(s) SubCutaneous at bedtime  latanoprost 0.005% Ophthalmic Solution 1 Drop(s) Both EYES at bedtime  levothyroxine 25 MICROGram(s) Oral daily  LORazepam   Injectable 0.5 milliGRAM(s) IV Push every 4 hours PRN  metoprolol tartrate Injectable 5 milliGRAM(s) IV Push every 12 hours  metroNIDAZOLE  IVPB 500 milliGRAM(s) IV Intermittent every 8 hours  ondansetron   Disintegrating Tablet 4 milliGRAM(s) Oral two times a day PRN  ondansetron Injectable 4 milliGRAM(s) IV Push every 6 hours PRN  pantoprazole  Injectable 40 milliGRAM(s) IV Push every 12 hours  sertraline 200 milliGRAM(s) Oral daily  sevelamer carbonate 800 milliGRAM(s) Oral three times a day with meals  sucralfate 1 Gram(s) Oral four times a day                            7.2    3.30  )-----------( 86       ( 05 Aug 2023 12:21 )             23.0       Hemoglobin: 7.2 g/dL (08-05 @ 12:21)  Hemoglobin: 7.7 g/dL (08-04 @ 07:58)  Hemoglobin: 7.3 g/dL (08-01 @ 17:05)      08-05    144  |  102  |  95<H>  ----------------------------<  109<H>  3.8   |  27  |  16.90<H>    Ca    8.1<L>      05 Aug 2023 12:21      Creatinine Trend: 16.90<--, 15.40<--, 16.10<--, 13.80<--, 11.30<--, 16.60<--    COAGS:           T(C): 36.9 (08-06-23 @ 05:40), Max: 37.4 (08-05-23 @ 20:39)  HR: 82 (08-06-23 @ 07:00) (82 - 94)  BP: 160/72 (08-06-23 @ 07:00) (146/58 - 173/70)  RR: 19 (08-06-23 @ 05:40) (18 - 19)  SpO2: 92% (08-06-23 @ 05:40) (92% - 97%)  Wt(kg): --    I&O's Summary    HEENT:  (-)icterus (-)pallor  CV: N S1 S2 1/6 DIANA (+)2 Pulses B/l  Resp:  Clear to ausculatation B/L, normal effort  GI: (+) BS Soft, NT, ND  Lymph:  (-)Edema, (-)obvious lymphadenopathy  Skin: Warm to touch, Normal turgor  Psych: Appropriate mood and affect         ASSESSMENT/PLAN: 	62y Male  Mateus Murray, walks with RW, with PMH of ESRD on HD (TTS), BKA- L w/prosthetic leg, DM, Left eye blindness, CHF,, HTN, HLD, EF 45-50%, normal perfusion 4/23 osteoporosis, bronchitis, current smoker, and anemia who presents with complaint of missed dialysis.     # CHF  - Due to missed HD  - HD per renal    # Positive trop  - nothing to suggest ACS.  His trop has been higher in the past and demonstrated normal perfusion on Stress 4/23  - ECHO noted, normal LV fx severe pulm HTN, cont to keep net negative     # Noncompliance  - Behavioral health f/u     # Smoking  - Cessation stressed    # HTN  - Suspect BP will improve once euvolemic     # N/V  - Surgery f/u

## 2023-08-06 NOTE — DISCHARGE NOTE PROVIDER - CARE PROVIDER_API CALL
Dario De La Torre  Internal Medicine  [address], [address] [place] [address]  Phone: [phone]  Fax: [phone]  Follow Up Time:

## 2023-08-06 NOTE — DISCHARGE NOTE PROVIDER - NSDCCPCAREPLAN_GEN_ALL_CORE_FT
PRINCIPAL DISCHARGE DIAGNOSIS  Diagnosis: Acute respiratory failure with hypoxia  Assessment and Plan of Treatment: You presented to the hospital after missed HD treatments. You were followed by a nephrologist, cardiologist and pulmonologist. During your hospitalization you developed acute respiratory failure due to being fluid overloaded.   Acute respiratory failure (ARF) is a condition that happens when your lungs cannot get enough oxygen into your blood.  Call your local emergency number (911 in the [place]) or have someone call if:  You have more trouble catching your breath.  You have stopped breathing.  You accepted partial dialysis treatments and your hypoxia has improved. It is importatnt that you comply with your HD session in order to prevent future episodes of respiratory failure.      SECONDARY DISCHARGE DIAGNOSES  Diagnosis: Acute on chronic combined systolic and diastolic congestive heart failure  Assessment and Plan of Treatment: Weigh yourself daily.  If you gain 3lbs in 3 days, or 5lbs in a week call your Health Care Provider.  Do not eat or drink foods containing more than 2000mg of salt (sodium) in your diet every day.  Call your Health Care Provider if you have any swelling or increased swelling in your feet, ankles, and/or stomach.  Take all of your medication as directed.  If you become dizzy call your Health Care Provider.      Diagnosis: ESRD on dialysis  Assessment and Plan of Treatment: Continue with your dialysis treatments. Follow with your nephrologist (kidney physician). Continue your medications as prescribed.      Diagnosis: Encounter for palliative care  Assessment and Plan of Treatment:     Diagnosis: CAD (coronary artery disease)  Assessment and Plan of Treatment: Coronary artery disease is a condition where the arteries the supply the heart muscle gets clogged with fatty deposits & puts you at risk for a heart attack. Continue with medications as prescribed.   Call your doctor if you have any new pain, pressure, or discomfort in the center of your chest, pain, tingling or discomfort in arms, back, neck, jaw, or stomach, shortness of breath, nausea, vomiting, burping or heartburn, sweating, cold and clammy skin, racing or abnormal heartbeat for more than 10 minutes or if they keep coming & going.  Call 911 and do not tr to get to hospital by care  You can help yourself with lifestyle changes (quitting smoking if you smoke), eat lots of fruits & vegetables & low fat dairy products, not a lot of meat & fatty foods, walk or some form of physical activity most days of the week, lose weight if you are overweight  Take your cardiac medication as prescribed to lower cholesterol, to lower blood pressure, aspirin to prevent blood clots, and diabetes control  Make sure to keep appointments with doctor for cardiac follow up care      Diagnosis: DM (diabetes mellitus)  Assessment and Plan of Treatment: Continue to follow with your primary care MD or your endocrinologist. Discuss what the goal hemoglobin A1C level is for you.  Follow a heart healthy diabetic diet. If you check your fingerstick glucose at home, call your MD if it is greater than 250mg/dL on 2 occasions or less than 100mg/dL on 2 occasions. Know signs of low blood sugar, such as: dizziness, shakiness, sweating, confusion, hunger, nervousness- drink 4 ounces apple juice if occurs and call your doctor. Know early signs of high blood sugar, such as: frequent urination, increased thirst, blurry vision, fatigue, headache - call your doctor if this occurs.      Diagnosis: HTN (hypertension)  Assessment and Plan of Treatment: You have a history of high blood pressure. High blood pressure is a condition that puts you at risk for heart attack, stroke and kidney disease. Please continue to take your medications as prescribed. You can also help control your blood pressure by maintaining a healthy weight, eating a diet low in fat and rich in fruits and vegetables, reduce the amount of salt in your diet. Also, reduce alcohol and try to include some form of physical activity daily for at least 30 mins. Follow up with your medical doctor to establish long term blood pressure treatment goals.  Notify your doctor if you have any of the following symptoms:   Dizziness, Lightheadedness, Blurry vision, Headache, Chest pain, Shortness of breath      Diagnosis: Pneumonia, aspiration  Assessment and Plan of Treatment:     Diagnosis: Gastroparesis  Assessment and Plan of Treatment:     Diagnosis: Anemia of chronic disease  Assessment and Plan of Treatment:     Diagnosis: Acute delirium  Assessment and Plan of Treatment: You were seen by a behavioral psychiatrist.  Consider increasing Zoloft to 200 mg PO daily  -PRN: Haldol 0.5 mg IM/IV q 8 hrs PRN for agitation  -Agree with Pall Care involvement  -Try to limit polypharmacy  -Monitor his QTc  -No need for an inpatient psychiatric admission or 1:1       PRINCIPAL DISCHARGE DIAGNOSIS  Diagnosis: Acute respiratory failure with hypoxia  Assessment and Plan of Treatment: You presented to the hospital after missed HD treatments. You were followed by a nephrologist, cardiologist and pulmonologist. During your hospitalization you developed acute respiratory failure due to being fluid overloaded.   Acute respiratory failure (ARF) is a condition that happens when your lungs cannot get enough oxygen into your blood.  Call your local emergency number (911 in the [place]) or have someone call if:  You have more trouble catching your breath.  You have stopped breathing.  You accepted partial dialysis treatments and your hypoxia has improved. It is importatnt that you comply with your HD session in order to prevent future episodes of respiratory failure.      SECONDARY DISCHARGE DIAGNOSES  Diagnosis: HTN (hypertension)  Assessment and Plan of Treatment: You have a history of high blood pressure. High blood pressure is a condition that puts you at risk for heart attack, stroke and kidney disease. Please continue to take your medications as prescribed. You can also help control your blood pressure by maintaining a healthy weight, eating a diet low in fat and rich in fruits and vegetables, reduce the amount of salt in your diet. Also, reduce alcohol and try to include some form of physical activity daily for at least 30 mins. Follow up with your medical doctor to establish long term blood pressure treatment goals.  Notify your doctor if you have any of the following symptoms:   Dizziness, Lightheadedness, Blurry vision, Headache, Chest pain, Shortness of breath      Diagnosis: DM (diabetes mellitus)  Assessment and Plan of Treatment: Continue to follow with your primary care MD or your endocrinologist. Discuss what the goal hemoglobin A1C level is for you.  Follow a heart healthy diabetic diet. If you check your fingerstick glucose at home, call your MD if it is greater than 250mg/dL on 2 occasions or less than 100mg/dL on 2 occasions. Know signs of low blood sugar, such as: dizziness, shakiness, sweating, confusion, hunger, nervousness- drink 4 ounces apple juice if occurs and call your doctor. Know early signs of high blood sugar, such as: frequent urination, increased thirst, blurry vision, fatigue, headache - call your doctor if this occurs.      Diagnosis: CAD (coronary artery disease)  Assessment and Plan of Treatment: Coronary artery disease is a condition where the arteries the supply the heart muscle gets clogged with fatty deposits & puts you at risk for a heart attack. Continue with medications as prescribed.   Call your doctor if you have any new pain, pressure, or discomfort in the center of your chest, pain, tingling or discomfort in arms, back, neck, jaw, or stomach, shortness of breath, nausea, vomiting, burping or heartburn, sweating, cold and clammy skin, racing or abnormal heartbeat for more than 10 minutes or if they keep coming & going.  Call 911 and do not tr to get to hospital by care  You can help yourself with lifestyle changes (quitting smoking if you smoke), eat lots of fruits & vegetables & low fat dairy products, not a lot of meat & fatty foods, walk or some form of physical activity most days of the week, lose weight if you are overweight  Take your cardiac medication as prescribed to lower cholesterol, to lower blood pressure, aspirin to prevent blood clots, and diabetes control  Make sure to keep appointments with doctor for cardiac follow up care      Diagnosis: Encounter for palliative care  Assessment and Plan of Treatment: While in the hospital you had palliative care discussions and wish full code.    Diagnosis: Acute on chronic combined systolic and diastolic congestive heart failure  Assessment and Plan of Treatment: Weigh yourself daily.  If you gain 3lbs in 3 days, or 5lbs in a week call your Health Care Provider.  Do not eat or drink foods containing more than 2000mg of salt (sodium) in your diet every day.  Call your Health Care Provider if you have any swelling or increased swelling in your feet, ankles, and/or stomach.  Take all of your medication as directed.  If you become dizzy call your Health Care Provider.      Diagnosis: ESRD on dialysis  Assessment and Plan of Treatment: Continue with your dialysis treatments. Follow with your nephrologist (kidney physician). Continue your medications as prescribed.      Diagnosis: Pneumonia, aspiration  Assessment and Plan of Treatment: While in the hospital you were treated and completed your antibiotics in the hospital.    Diagnosis: Gastroparesis  Assessment and Plan of Treatment: You have a history of gastroparesis which means your stomach has delayed emptying which affects your digestion.  Please eat small meals.  If unable to tolerate solid food, try soft foods or liquids in order to maintain proper nutrition.    Diagnosis: Anemia of chronic disease  Assessment and Plan of Treatment: You have chronic anemia due to your many health issues.    Diagnosis: Acute delirium  Assessment and Plan of Treatment: While in the hospital you were seen by a behavioral psychiatrist.

## 2023-08-06 NOTE — PROGRESS NOTE ADULT - ASSESSMENT
A/P  ESRD ON  HD  WITH H/O  NON  COMPLIANCE  WITH HIS  HD  REGIMEN     HENRY  REFUSES  HIS  HD  AND  WHEN  HE GETS ON THE  MACHINE   DEMANDS TO  BE RINSED  BACK   VERY  EARLY    HAS BEEN  EDUCATED ON  THE RISKS ON  NUMBER OF OCCASIONS   THREATENS TO  PULL  OUT HIS NEEDLES  IF  THERE IS DELAY  IN  RINSING HIM  BACK    VERY  DIFF TO  MANAGE  HIM     HENRY  REFUSES  HIS MEDS AND  BLOOD WORK  AS  WELL

## 2023-08-06 NOTE — PROGRESS NOTE ADULT - SUBJECTIVE AND OBJECTIVE BOX
62y Male    Meds:  cefepime   IVPB 1000 milliGRAM(s) IV Intermittent every 24 hours  metroNIDAZOLE  IVPB 500 milliGRAM(s) IV Intermittent every 8 hours    Allergies    No Known Drug Allergies  fish (Rash)  liver (Anaphylaxis)    Intolerances        VITALS:  Vital Signs Last 24 Hrs  T(C): 36.5 (06 Aug 2023 14:25), Max: 37.4 (05 Aug 2023 20:39)  T(F): 97.7 (06 Aug 2023 14:25), Max: 99.4 (05 Aug 2023 20:39)  HR: 86 (06 Aug 2023 14:25) (82 - 94)  BP: 168/79 (06 Aug 2023 14:25) (146/58 - 173/70)  BP(mean): --  RR: 19 (06 Aug 2023 14:25) (19 - 19)  SpO2: 93% (06 Aug 2023 14:25) (92% - 93%)    Parameters below as of 06 Aug 2023 14:25  Patient On (Oxygen Delivery Method): nasal cannula  O2 Flow (L/min): 4      LABS/DIAGNOSTIC TESTS:                          7.2    3.30  )-----------( 86       ( 05 Aug 2023 12:21 )             23.0         08-05    144  |  102  |  95<H>  ----------------------------<  109<H>  3.8   |  27  |  16.90<H>    Ca    8.1<L>      05 Aug 2023 12:21            CULTURES: .Blood Blood-Peripheral  05-28 @ 14:07   No Growth Final  --  --            RADIOLOGY:      ROS:  [  ] UNABLE TO ELICIT 62y Male who is not even attempting to talk to me when I ask him question, he has been difficult with the staff and is refusing dialysis. He has completed 8 days of antibiotics and so has completed his course. He has no fevers and has no Leukocytosis.    Meds:  cefepime   IVPB 1000 milliGRAM(s) IV Intermittent every 24 hours  metroNIDAZOLE  IVPB 500 milliGRAM(s) IV Intermittent every 8 hours    Allergies    No Known Drug Allergies  fish (Rash)  liver (Anaphylaxis)    Intolerances        VITALS:  Vital Signs Last 24 Hrs  T(C): 36.5 (06 Aug 2023 14:25), Max: 37.4 (05 Aug 2023 20:39)  T(F): 97.7 (06 Aug 2023 14:25), Max: 99.4 (05 Aug 2023 20:39)  HR: 86 (06 Aug 2023 14:25) (82 - 94)  BP: 168/79 (06 Aug 2023 14:25) (146/58 - 173/70)  BP(mean): --  RR: 19 (06 Aug 2023 14:25) (19 - 19)  SpO2: 93% (06 Aug 2023 14:25) (92% - 93%)    Parameters below as of 06 Aug 2023 14:25  Patient On (Oxygen Delivery Method): nasal cannula  O2 Flow (L/min): 4      LABS/DIAGNOSTIC TESTS:                          7.2    3.30  )-----------( 86       ( 05 Aug 2023 12:21 )             23.0         08-05    144  |  102  |  95<H>  ----------------------------<  109<H>  3.8   |  27  |  16.90<H>    Ca    8.1<L>      05 Aug 2023 12:21            CULTURES: .Blood Blood-Peripheral  05-28 @ 14:07   No Growth Final  --  --            RADIOLOGY:      ROS:  [  x] UNABLE TO ELICIT

## 2023-08-06 NOTE — PROGRESS NOTE ADULT - ASSESSMENT
62 year old male from [facility], walks with RW, with PMH of ESRD on HD (TTS), BKA- L w/prosthetic leg, DM, Left eye blindness, CHF, CAD, HTN, HLD, osteoporosis, bronchitis, current smoker, and anemia who presents with complaint of missed dialysis and does not make any urine. Patient states he was having shortness of breath. Patient admitted to telemetry for Acute Hyperkalemia 2/2 missed dialysis found to have pulmonary edema and BNP [id number]. Cardiology and nephro following.     Additionally pt had coffee ground emesis and abdominal pain, repeat CT A/P was negative for small bowel obstruction. But noted for cholelithiasis and trace pericholecystic fluid. Surgery following, concern for possible acute cholecystitis, HIDA scan was negative for acute cholecystitis. Additionally GI was consulted for possible endoscopy due to recent anemia and coffee ground emesis, no additional intervention at this time.     Hospital course complicated by Acute Hypoxic Respiratory Failure 2/2 worsening CHF. Patient was a rapid response due to hypoxia of 70-80% on room air, now requiring 4L oxygen via nasal cannula. Repeat CXR showing worsening pulmonary edema. Pt also found to have small bilateral pleural effusions, bilateral atelectasis and possible aspiration pneumonia, patient was started on cefepime and flagyl, ID Dr. Newby was consulted.    Patient for dialysis today 8/1, continues to have intermittent nausea/vomiting. Brother, Georgi King updated and aware of plan of care.     Patient is refusing blood work and meds again, HIDA negative for cholecystitis, cardiology consult noted, CHF likely 2/2 fluid overload after missing dialysis op.    Palliative consulted, patient refused blood work again this AM, will re consult  dr Linder as patient is unstable about decision making regarding his treatment. Patient brother is updated daily about patient condition and refusals.  Patient is scheduled for dialysis today, will obtain CBC and BNP. Last night US guided IV was inserted by on chanda l ICU team, but patient removed IV at 9PM 2 hours after.    08/04- pt ia refusing blood work again this morning. Dialysis yesterday was cancelled as patient refused. Becoming more lethargic today. Zoloft increased to 200mg qd as per  recommendation.   Patient brother updated on patient non compliance. Jacoby rodriguez is scheduled for today to discuss patient treatment options.

## 2023-08-06 NOTE — PROGRESS NOTE ADULT - ASSESSMENT
? aspiration Pneumonia - clinically he has no evidence of Pneumonia and appears to have had CHF.    Plan - DC all antibiotics   DC planning on possible Hospice  reconsult prn.

## 2023-08-06 NOTE — PROGRESS NOTE ADULT - SUBJECTIVE AND OBJECTIVE BOX
Time of Visit:  Patient seen and examined. pat is lying in bed     MEDICATIONS  (STANDING):  albuterol/ipratropium for Nebulization 3 milliLiter(s) Nebulizer every 6 hours  amLODIPine   Tablet 10 milliGRAM(s) Oral daily  aspirin enteric coated 81 milliGRAM(s) Oral daily  atorvastatin 40 milliGRAM(s) Oral at bedtime  budesonide 160 MICROgram(s)/formoterol 4.5 MICROgram(s) Inhaler 2 Puff(s) Inhalation two times a day  cefepime   IVPB 1000 milliGRAM(s) IV Intermittent every 24 hours  chlorhexidine 2% Cloths 1 Application(s) Topical daily  dextrose 5%. 1000 milliLiter(s) (100 mL/Hr) IV Continuous <Continuous>  dextrose 5%. 1000 milliLiter(s) (50 mL/Hr) IV Continuous <Continuous>  dextrose 50% Injectable 25 Gram(s) IV Push once  dextrose 50% Injectable 12.5 Gram(s) IV Push once  dextrose 50% Injectable 25 Gram(s) IV Push once  epoetin beverley (PROCRIT) Injectable 61715 Unit(s) IV Push <User Schedule>  folic acid 1 milliGRAM(s) Oral daily  furosemide   Injectable 40 milliGRAM(s) IV Push daily  glucagon  Injectable 1 milliGRAM(s) IntraMuscular once  hydrALAZINE Injectable 5 milliGRAM(s) IV Push three times a day  insulin glargine Injectable (LANTUS) 4 Unit(s) SubCutaneous at bedtime  latanoprost 0.005% Ophthalmic Solution 1 Drop(s) Both EYES at bedtime  levothyroxine 25 MICROGram(s) Oral daily  metoprolol tartrate Injectable 5 milliGRAM(s) IV Push every 12 hours  metroNIDAZOLE  IVPB 500 milliGRAM(s) IV Intermittent every 8 hours  pantoprazole  Injectable 40 milliGRAM(s) IV Push every 12 hours  sertraline 200 milliGRAM(s) Oral daily  sevelamer carbonate 800 milliGRAM(s) Oral three times a day with meals  sucralfate 1 Gram(s) Oral four times a day      MEDICATIONS  (PRN):  dextrose Oral Gel 15 Gram(s) Oral once PRN Blood Glucose LESS THAN 70 milliGRAM(s)/deciliter  haloperidol    Injectable 0.5 milliGRAM(s) IntraMuscular every 8 hours PRN Agitation  LORazepam   Injectable 0.5 milliGRAM(s) IV Push every 4 hours PRN agitation and nausea  ondansetron   Disintegrating Tablet 4 milliGRAM(s) Oral two times a day PRN Nausea and/or Vomiting  ondansetron Injectable 4 milliGRAM(s) IV Push every 6 hours PRN Nausea and/or Vomiting       Medications up to date at time of exam.      PHYSICAL EXAMINATION:  Patient has no new complaints.  GENERAL: The patient is a well-developed, well-nourished, in no apparent distress.     Vital Signs Last 24 Hrs  T(C): 36.5 (06 Aug 2023 14:25), Max: 37.4 (05 Aug 2023 20:39)  T(F): 97.7 (06 Aug 2023 14:25), Max: 99.4 (05 Aug 2023 20:39)  HR: 86 (06 Aug 2023 14:25) (82 - 94)  BP: 168/79 (06 Aug 2023 14:25) (146/58 - 173/70)  BP(mean): --  RR: 19 (06 Aug 2023 14:25) (19 - 19)  SpO2: 93% (06 Aug 2023 14:25) (92% - 93%)    Parameters below as of 06 Aug 2023 14:25  Patient On (Oxygen Delivery Method): nasal cannula  O2 Flow (L/min): 4     (if applicable)    Chest Tube (if applicable)    HEENT: Head is normocephalic and atraumatic. Extraocular muscles are intact. Mucous membranes are moist.     NECK: Supple, no palpable adenopathy.    LUNGS: Clear to auscultation, no wheezing, rales, or rhonchi.    HEART: Regular rate and rhythm without murmur.    ABDOMEN: Soft, nontender, and nondistended.  No hepatosplenomegaly is noted.    : No painful voiding, no pelvic pain    EXTREMITIES: Without any cyanosis, clubbing, rash, lesions or edema.    NEUROLOGIC: Awake    SKIN: Warm, dry, good turgor.      LABS:                        7.2    3.30  )-----------( 86       ( 05 Aug 2023 12:21 )             23.0     08-05    144  |  102  |  95<H>  ----------------------------<  109<H>  3.8   |  27  |  16.90<H>    Ca    8.1<L>      05 Aug 2023 12:21        Urinalysis Basic - ( 05 Aug 2023 12:21 )    Color: x / Appearance: x / SG: x / pH: x  Gluc: 109 mg/dL / Ketone: x  / Bili: x / Urobili: x   Blood: x / Protein: x / Nitrite: x   Leuk Esterase: x / RBC: x / WBC x   Sq Epi: x / Non Sq Epi: x / Bacteria: x                      MICROBIOLOGY: (if applicable)    RADIOLOGY & ADDITIONAL STUDIES:  EKG:   CXR:  ECHO:    IMPRESSION: 62y Male 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      Osteoarthritis      Vision loss of left eye  blind      ESRD on hemodialysis  T/Th/S      Cataract  both eyes - hx of sx done      BPH (benign prostatic hyperplasia)      UTI (urinary tract infection)  hx of      Bladder mass  hx of      H/O hematuria      Osteoporosis      Vision loss of right eye  decreased      Depression      Chronic GERD      Osteomyelitis of vertebra      CHF (congestive heart failure)      Below knee amputation status, left  2012- pt is wearing prostesis      History of right cataract extraction      History of left cataract extraction      S/P arteriovenous (AV) fistula creation  right arm brachiocephalic arteriovenous fistula on 11/08/2018      H/O hematuria  s/p bladder bx and fulguration 2/25/2020      H/O transurethral destruction of bladder lesion  2020      History of excision of mass  back mass on 03/31/2021       p/w         Impression: This is a 63 Y/O Male from [facility] with ESRD on HD ( T-Th-Sat ). Current smoker . Presented to ED due to Missed Dialysis, last HD 07-22-23 . Per Patient stated that he often missed HD sessions due to Mild left leg pain and right leg swelling . S/p RRT for SOB with Hypoxia due to Acute Hypoxic Respiratory Failure secondary to Pulmonary edema/ Fluid overload due to Missed Dialysis . CT Abdomen with Small Bilateral Pleural Effusions, Rt Lower Lung with groundglass opacity. No bowel obstruction. Small Gall Stone.   Vomiting due to diabetic gastroparesis vs acute cholecystitis  unlikely SBO . Pat refused dialysis and blood work today      Suggestions:    - O2 supp as needed to maintain sat >90%  - Advise pat to accept dialysis treatment   - Duoneb via nebulization Q 6 Hours.  - Asp precaution   - Consider Reglan 10 mg iv q6h to increase GI motility   - Zofran for n/v  - Antibx

## 2023-08-06 NOTE — DISCHARGE NOTE PROVIDER - HOSPITAL COURSE
62 year old male from [facility], walks with RW, with PMH of ESRD on HD (TTS), BKA- L w/prosthetic leg, DM, Left eye blindness, CHF, CAD, HTN, HLD, osteoporosis, bronchitis, current smoker, and anemia who presents with complaint of missed dialysis.  Patient admitted to telemetry for Acute Hyperkalemia 2/2 missed dialysis found to have pulmonary edema and BNP [id number]. Cardiology and nephro following.  Pt continues to be non complaint with HD treatment. Palliative care team consulted [facility]---->>>>    Hospital course c/b coffee ground emesis and abdominal pain, repeat CT A/P was negative for small bowel obstruction. But noted for cholelithiasis and trace pericholecystic fluid. Surgery following, concern for possible acute cholecystitis, HIDA scan was negative for acute cholecystitis. Additionally GI was consulted for possible endoscopy due to recent anemia and coffee ground emesis, no additional intervention at this time.       Hospital course was further complicated by Acute Hypoxic Respiratory Failure 2/2 worsening CHF. Patient was a rapid response due to hypoxia of 70-80% on room air. Repeat CXR showing worsening pulmonary edema. Pt also found to have small bilateral pleural effusions, bilateral atelectasis and possible aspiration pneumonia, patient was started on cefepime and flagyl, course completed 8/6/23 ID Dr. Newby was consulted.      Incomplete 8/6--->>    Please note that this a brief summary of hospital course please refer to daily progress notes and consult notes for full course and events 62 year old male from [facility], walks with RW, with PMH of ESRD on HD (TTS), BKA- L w/prosthetic leg, DM, Left eye blindness, CHF, CAD, HTN, HLD, osteoporosis, bronchitis, current smoker, and anemia who presents with complaint of missed dialysis.  Patient admitted to telemetry for Acute Hyperkalemia 2/2 missed dialysis found to have pulmonary edema and BNP [id number]. Cardiology and nephro following.  Pt continues to be non complaint with HD treatment. Patient to be premedicated with Lorazepam before HD.     Hospital course c/b coffee ground emesis and abdominal pain, repeat CT A/P was negative for small bowel obstruction. But noted for cholelithiasis and trace pericholecystic fluid. Surgery following, concern for possible acute cholecystitis, HIDA scan was negative for acute cholecystitis. Additionally GI was consulted for possible endoscopy due to recent anemia and coffee ground emesis, no additional intervention at this time.       Hospital course was further complicated by Acute Hypoxic Respiratory Failure 2/2 worsening CHF. Patient was a rapid response due to hypoxia of 70-80% on room air. Repeat CXR showing worsening pulmonary edema. Pt also found to have small bilateral pleural effusions, bilateral atelectasis and possible aspiration pneumonia, patient was started on cefepime and flagyl, course completed 8/6/23 ID Dr. Newby was consulted.    Patient develop isolated fever, refusing blood work. Patient with loose stool started Vanco PO, no studies sent as patient refusing. Patient waiting for LTC with HD session.     Incomplete 8/23 62 year old male from [facility], walks with RW, with PMH of ESRD on HD (TTS), BKA- L w/prosthetic leg, DM, Left eye blindness, CHF, CAD, HTN, HLD, osteoporosis, bronchitis, current smoker, and anemia who presents with complaint of missed dialysis.  Patient admitted to telemetry for Acute Hyperkalemia 2/2 missed dialysis found to have pulmonary edema and BNP [id number]. Cardiology and nephro following.  Pt continues to be non complaint with HD treatment. Patient to be premedicated with Lorazepam before HD.     Hospital course c/b coffee ground emesis and abdominal pain, repeat CT A/P was negative for small bowel obstruction. But noted for cholelithiasis and trace pericholecystic fluid. Surgery following, concern for possible acute cholecystitis, HIDA scan was negative for acute cholecystitis. Additionally GI was consulted for possible endoscopy due to recent anemia and coffee ground emesis, no additional intervention at this time.       Hospital course was further complicated by Acute Hypoxic Respiratory Failure 2/2 worsening CHF. Patient was a rapid response due to hypoxia of 70-80% on room air. Repeat CXR showing worsening pulmonary edema. Pt also found to have small bilateral pleural effusions, bilateral atelectasis and possible aspiration pneumonia, patient was started on cefepime and flagyl, course completed 8/6/23 ID Dr. Newby was consulted.    Patient develop isolated fever, refusing blood work. Patient with loose stool started Vanco PO x 10days  no studies sent as patient refusing. Patient waiting for LTC with HD session.     Given patient's clinical status and current hemodynamic stability decision was made to discharge.   Pt is stable for discharge per attending and is advised to f/u with PCP as out-patient.   Please refer to Pt's complete medical chart with documents for a full hospital course, for this is only a brief summary.     62 year old male from [facility], walks with RW, with PMH of ESRD on HD (TTS), BKA- L w/prosthetic leg, DM, Left eye blindness, CHF, CAD, HTN, HLD, osteoporosis, bronchitis, current smoker, and anemia who presents with complaint of missed dialysis.  Patient admitted to telemetry for Acute Hyperkalemia 2/2 missed dialysis found to have pulmonary edema and BNP [id number]. Cardiology and nephro following.  Pt continues to be non complaint with HD treatment. Patient to be premedicated with Lorazepam before HD.     Hospital course c/b coffee ground emesis and abdominal pain, repeat CT A/P was negative for small bowel obstruction. But noted for cholelithiasis and trace pericholecystic fluid. Surgery following, concern for possible acute cholecystitis, HIDA scan was negative for acute cholecystitis. Additionally GI was consulted for possible endoscopy due to recent anemia and coffee ground emesis, no additional intervention at this time.     Hospital course was further complicated by Acute Hypoxic Respiratory Failure 2/2 worsening CHF. Patient was a rapid response due to hypoxia of 70-80% on room air. Repeat CXR showing worsening pulmonary edema. Pt also found to have small bilateral pleural effusions, bilateral atelectasis and possible aspiration pneumonia, patient was started on cefepime and flagyl, course completed 8/6/23 ID Dr. Newby was consulted.    Patient develop isolated fever, refusing blood work. Patient with loose stool started Vanco PO x 10days  no studies sent as patient refused.     Given patient's clinical status and current hemodynamic stability decision was made to discharge.   Pt is stable for discharge per attending and is advised to f/u with PCP as out-patient.   Please refer to Pt's complete medical chart with documents for a full hospital course, for this is only a brief summary.

## 2023-08-06 NOTE — PROGRESS NOTE ADULT - SUBJECTIVE AND OBJECTIVE BOX
Patient is a 62y Male with  ESRD  ON HD    WAS SCHEDULED FOR  HD  YESTERDAY  AS  PT  WANTED  TO BE  DIALYZED    DEMANDED  TO BE RINSED  BACK  AFTER  30-35  MINS     WHEN  I  ASKED  HIM  WHY  HE DID NOT COMPLETE HIS PRESCRIBED TIME ,  HE SAYS HE WAS NOT FEELING WELL    DOESNT WANT TO TELL  ME  WHAT WAS WRONG WITH HIM!!    No Known Drug Allergies  fish (Rash)  liver (Anaphylaxis)    Hospital Medications:   MEDICATIONS  (STANDING):  albuterol/ipratropium for Nebulization 3 milliLiter(s) Nebulizer every 6 hours  amLODIPine   Tablet 10 milliGRAM(s) Oral daily  aspirin enteric coated 81 milliGRAM(s) Oral daily  atorvastatin 40 milliGRAM(s) Oral at bedtime  budesonide 160 MICROgram(s)/formoterol 4.5 MICROgram(s) Inhaler 2 Puff(s) Inhalation two times a day  cefepime   IVPB 1000 milliGRAM(s) IV Intermittent every 24 hours  chlorhexidine 2% Cloths 1 Application(s) Topical daily  dextrose 5%. 1000 milliLiter(s) (50 mL/Hr) IV Continuous <Continuous>  dextrose 5%. 1000 milliLiter(s) (100 mL/Hr) IV Continuous <Continuous>  dextrose 50% Injectable 25 Gram(s) IV Push once  dextrose 50% Injectable 12.5 Gram(s) IV Push once  dextrose 50% Injectable 25 Gram(s) IV Push once  epoetin beverley (PROCRIT) Injectable 05947 Unit(s) IV Push <User Schedule>  folic acid 1 milliGRAM(s) Oral daily  furosemide   Injectable 40 milliGRAM(s) IV Push daily  glucagon  Injectable 1 milliGRAM(s) IntraMuscular once  hydrALAZINE Injectable 5 milliGRAM(s) IV Push three times a day  insulin glargine Injectable (LANTUS) 4 Unit(s) SubCutaneous at bedtime  latanoprost 0.005% Ophthalmic Solution 1 Drop(s) Both EYES at bedtime  levothyroxine 25 MICROGram(s) Oral daily  metoprolol tartrate Injectable 5 milliGRAM(s) IV Push every 12 hours  metroNIDAZOLE  IVPB 500 milliGRAM(s) IV Intermittent every 8 hours  pantoprazole  Injectable 40 milliGRAM(s) IV Push every 12 hours  sertraline 200 milliGRAM(s) Oral daily  sevelamer carbonate 800 milliGRAM(s) Oral three times a day with meals  sucralfate 1 Gram(s) Oral four times a day    REVIEW OF SYSTEMS:  DOESNT  WANT TO ANSWER  QUESTIONS ASKED    VITALS:  T(F): 98.4 (08-06-23 @ 05:40), Max: 99.4 (08-05-23 @ 20:39)  HR: 82 (08-06-23 @ 07:00)  BP: 160/72 (08-06-23 @ 07:00)  RR: 19 (08-06-23 @ 05:40)  SpO2: 92% (08-06-23 @ 05:40)  Wt(kg): --      PHYSICAL EXAM:  Constitutional: NAD  HEENT:CONJ PALE  Neck: No JVD  Respiratory: CTAB, DECREASED  BREATH SOUNDS AT BASES POST  Cardiovascular: S1, S2, RRR  Gastrointestinal: BS+, soft, NT/ND  Extremities: 1+ peripheral edema R  LEG    HAS L  PROSTHESIS  ON  Neurological:  DOESNT  WANT TO ANSWER QUESTIONS  :  No [place].   Vascular Access: R  UPPER  ARM AVF   WORKING  WELL    LABS:  08-05    144  |  102  |  95<H>  ----------------------------<  109<H>  3.8   |  27  |  16.90<H>    Ca    8.1<L>      05 Aug 2023 12:21      Creatinine Trend: 16.90 <--, 15.40 <--, 16.10 <--, 13.80 <--                        7.2    3.30  )-----------( 86       ( 05 Aug 2023 12:21 )             23.0     Urine Studies:  Urinalysis Basic - ( 05 Aug 2023 12:21 )    Color:  / Appearance:  / SG:  / pH:   Gluc: 109 mg/dL / Ketone:   / Bili:  / Urobili:    Blood:  / Protein:  / Nitrite:    Leuk Esterase:  / RBC:  / WBC    Sq Epi:  / Non Sq Epi:  / Bacteria:         RADIOLOGY & ADDITIONAL STUDIES:

## 2023-08-06 NOTE — DISCHARGE NOTE PROVIDER - NSDCFUSCHEDAPPT_GEN_ALL_CORE_FT
Carolyne Webster  [facility]  [address]STR[id number]  Scheduled Appointment: 08/24/2023     Carolyne Webster  [facility]  [id number]  Scheduled Appointment: 08/24/2023    [facility]  VASCULAR [address]  Scheduled Appointment: 11/06/2023    Ambrocio Mason  [facility]  VASCULAR [address]  Scheduled Appointment: 11/06/2023     [facility]  VASCULAR [address]  Scheduled Appointment: 11/06/2023    Ambrocio Mason  [facility]  VASCULAR [address]  Scheduled Appointment: 11/06/2023

## 2023-08-06 NOTE — DISCHARGE NOTE PROVIDER - NSDCMRMEDTOKEN_GEN_ALL_CORE_FT
Albuterol (Eqv-ProAir HFA) 90 mcg/inh inhalation aerosol: 2 puff(s) inhaled every 6 hours as needed for  shortness of breath and/or wheezing  ALPRAZolam 0.25 mg oral tablet: 1 tab(s) orally once a day (at bedtime)  aspirin 81 mg oral tablet: 1 tab(s) orally once a day  Ativan 0.5 mg oral tablet: 1 tab(s) orally Tuesday, Thursday, Saturday  calcium (as carbonate) 500 mg oral tablet: 1 tab(s) orally once a day  Crestor 40 mg oral tablet: 1 tab(s) orally once a day (at bedtime)  cyanocobalamin 100 mcg oral tablet: 1 tab(s) orally once a day  epoetin beverley 20,000 units/mL injectable solution: 20,000 unit(s) intravenously Tuesday, Thursday, Saturday during dialysis  ferrous sulfate 325 mg (65 mg elemental iron) oral delayed release tablet: 1 tab(s) orally every other day  folic acid 1 mg oral tablet: 1 tab(s) orally once a day  hydrALAZINE 25 mg oral tablet: 1 tab(s) orally 3 times a day  Lantus 100 units/mL subcutaneous solution: 4 unit(s) subcutaneous once a day (at bedtime)  Lasix 80 mg oral tablet: 1 tab(s) orally once a day  latanoprost 0.005% ophthalmic solution: in each eye once a day (at bedtime)  Lokelma 10 g oral powder for reconstitution: 10 gram(s) orally Monday, Wednesday, and Friday  melatonin 6 mg oral tablet, disintegratin tab(s) orally once a day  metOLazone 10 mg oral tablet: 1 tab(s) orally once a day  metoprolol succinate 50 mg oral tablet, extended release: 1 tab(s) orally once a day  nicotine 7 mg/24 hr transdermal film, extended release: 1 patch transdermal once a day  Norvasc 10 mg oral tablet: 1 tab(s) orally once a day  Percocet 5/325 oral tablet: 1 tab(s) orally every 6 hours  Protonix 40 mg oral delayed release tablet: 1 tab(s) orally once a day  Reglan 10 mg oral tablet: 1 tab(s) orally 3 times a day  sucralfate 1 g oral tablet: 1 tab(s) orally 4 times a day  Symbicort 160 mcg-4.5 mcg/inh inhalation aerosol: 2 puff(s) inhaled 2 times a day  Synthroid 25 mcg (0.025 mg) oral tablet: 1 tab(s) orally once a day   Albuterol (Eqv-ProAir HFA) 90 mcg/inh inhalation aerosol: 2 puff(s) inhaled every 6 hours as needed for  shortness of breath and/or wheezing  ALPRAZolam 0.25 mg oral tablet: 1 tab(s) orally once a day (at bedtime)  aspirin 81 mg oral tablet: 1 tab(s) orally once a day  Ativan 0.5 mg oral tablet: 1 tab(s) orally Tuesday, Thursday, Saturday  Crestor 40 mg oral tablet: 1 tab(s) orally once a day (at bedtime)  cyanocobalamin 100 mcg oral tablet: 1 tab(s) orally once a day  epoetin beverley 20,000 units/mL injectable solution: 20,000 unit(s) intravenously Tuesday, Thursday, Saturday during dialysis  ferrous sulfate 325 mg (65 mg elemental iron) oral delayed release tablet: 1 tab(s) orally every other day  folic acid 1 mg oral tablet: 1 tab(s) orally once a day  hydrALAZINE 25 mg oral tablet: 1 tab(s) orally 3 times a day  Lantus 100 units/mL subcutaneous solution: 4 unit(s) subcutaneous once a day (at bedtime)  Lasix 80 mg oral tablet: 1 tab(s) orally once a day  latanoprost 0.005% ophthalmic solution: in each eye once a day (at bedtime)  Lokelma 10 g oral powder for reconstitution: 10 gram(s) orally Monday, Wednesday, and Friday  melatonin 6 mg oral tablet, disintegratin tab(s) orally once a day  metOLazone 10 mg oral tablet: 1 tab(s) orally once a day  metoprolol succinate 50 mg oral tablet, extended release: 1 tab(s) orally once a day  nicotine 7 mg/24 hr transdermal film, extended release: 1 patch transdermal once a day  Norvasc 10 mg oral tablet: 1 tab(s) orally once a day  Protonix 40 mg oral delayed release tablet: 1 tab(s) orally once a day  Reglan 10 mg oral tablet: 1 tab(s) orally 3 times a day  sertraline 100 mg oral tablet: 2 tab(s) orally once a day  sucralfate 1 g oral tablet: 1 tab(s) orally 4 times a day  Symbicort 160 mcg-4.5 mcg/inh inhalation aerosol: 2 puff(s) inhaled 2 times a day  Synthroid 25 mcg (0.025 mg) oral tablet: 1 tab(s) orally once a day   albuterol 90 mcg/inh inhalation aerosol: 2 puff(s) inhaled every 6 hours  amLODIPine 10 mg oral tablet: 1 tab(s) orally once a day  aspirin 81 mg oral delayed release tablet: 1 tab(s) orally once a day  atorvastatin 40 mg oral tablet: 1 tab(s) orally once a day (at bedtime)  budesonide-formoterol 160 mcg-4.5 mcg/inh inhalation aerosol: 2 puff(s) inhaled 2 times a day  folic acid 1 mg oral tablet: 1 tab(s) orally once a day  furosemide 80 mg oral tablet: 1 tab(s) orally once a day  hydrALAZINE 25 mg oral tablet: 1 tab(s) orally 3 times a day  insulin lispro 100 units/mL injectable solution: injectable 3 times a day (before meals) Insulin lispro (ADMELOG) corrective regimen sliding scale  1 Unit(s) if Glucose 151 - 200  2 Unit(s) if Glucose 201 - 250  3 Unit(s) if Glucose 251 - 300  4 Unit(s) if Glucose 301 - 350  5 Unit(s) if Glucose 351 - 400  6 Unit(s) if Glucose Greater Than 400  SubCutaneous three times a day before meals  insulin lispro 100 units/mL injectable solution: injectable once a day (at bedtime) insulin lispro (ADMELOG) corrective regimen sliding scale  0 Unit(s) if Glucose 0 - 250  1 Unit(s) if Glucose 251 - 300  2 Unit(s) if Glucose 301 - 350  3 Unit(s) if Glucose 351 - 400  4 Unit(s) if Glucose Greater Than 400 SubCutaneous at bedtime  lactobacillus acidophilus oral capsule: 1 tab(s) orally 2 times a day continue while on antibiotic  latanoprost 0.005% ophthalmic solution: 1 drop(s) to each affected eye once a day (at bedtime)  levothyroxine 25 mcg (0.025 mg) oral tablet: 1 tab(s) orally once a day  LORazepam 2 mg oral tablet: 1 tab(s) orally Tuesday, Thursday, Saturday Dialysis premedication  metoprolol succinate 50 mg oral tablet, extended release: 1 tab(s) orally once a day  pantoprazole 40 mg oral delayed release tablet: 1 tab(s) orally 2 times a day  sertraline 100 mg oral tablet: 2 tab(s) orally once a day  sevelamer carbonate 800 mg oral tablet: 1 tab(s) orally 3 times a day (with meals)  Vancocin HCl 250 mg/5 mL oral liquid: 2.5 milliliter(s) orally every 6 hours Continue x 4 days to complete hospital treatment

## 2023-08-06 NOTE — PROGRESS NOTE ADULT - SUBJECTIVE AND OBJECTIVE BOX
Patient is a 62y old  Male who presents with a chief complaint of Missed dialysis (8/6/23      INTERVAL HPI/OVERNIGHT EVENTS:  pt seen and examined    MEDICATIONS  (STANDING):  albuterol/ipratropium for Nebulization 3 milliLiter(s) Nebulizer every 6 hours  amLODIPine   Tablet 10 milliGRAM(s) Oral daily  aspirin enteric coated 81 milliGRAM(s) Oral daily  atorvastatin 40 milliGRAM(s) Oral at bedtime  budesonide 160 MICROgram(s)/formoterol 4.5 MICROgram(s) Inhaler 2 Puff(s) Inhalation two times a day  chlorhexidine 2% Cloths 1 Application(s) Topical daily  dextrose 5%. 1000 milliLiter(s) (50 mL/Hr) IV Continuous <Continuous>  dextrose 5%. 1000 milliLiter(s) (100 mL/Hr) IV Continuous <Continuous>  dextrose 50% Injectable 25 Gram(s) IV Push once  dextrose 50% Injectable 12.5 Gram(s) IV Push once  dextrose 50% Injectable 25 Gram(s) IV Push once  epoetin beverley (PROCRIT) Injectable 44841 Unit(s) IV Push <User Schedule>  folic acid 1 milliGRAM(s) Oral daily  furosemide   Injectable 40 milliGRAM(s) IV Push daily  glucagon  Injectable 1 milliGRAM(s) IntraMuscular once  hydrALAZINE Injectable 5 milliGRAM(s) IV Push three times a day  insulin glargine Injectable (LANTUS) 4 Unit(s) SubCutaneous at bedtime  latanoprost 0.005% Ophthalmic Solution 1 Drop(s) Both EYES at bedtime  levothyroxine 25 MICROGram(s) Oral daily  metoprolol tartrate Injectable 5 milliGRAM(s) IV Push every 12 hours  pantoprazole  Injectable 40 milliGRAM(s) IV Push every 12 hours  sertraline 200 milliGRAM(s) Oral daily  sevelamer carbonate 800 milliGRAM(s) Oral three times a day with meals  sucralfate 1 Gram(s) Oral four times a day    MEDICATIONS  (PRN):  dextrose Oral Gel 15 Gram(s) Oral once PRN Blood Glucose LESS THAN 70 milliGRAM(s)/deciliter  haloperidol    Injectable 0.5 milliGRAM(s) IntraMuscular every 8 hours PRN Agitation  LORazepam   Injectable 0.5 milliGRAM(s) IV Push every 4 hours PRN agitation and nausea  ondansetron   Disintegrating Tablet 4 milliGRAM(s) Oral two times a day PRN Nausea and/or Vomiting  ondansetron Injectable 4 milliGRAM(s) IV Push every 6 hours PRN Nausea and/or Vomiting    __________________________________________________  REVIEW OF SYSTEMS:    CONSTITUTIONAL: No fever,   EYES: no acute visual disturbances  NECK: No pain or stiffness  RESPIRATORY: No cough; No shortness of breath  CARDIOVASCULAR: No chest pain, no palpitations  GASTROINTESTINAL: No pain. No nausea or vomiting; No diarrhea   NEUROLOGICAL: No headache or numbness, no tremors  MUSCULOSKELETAL: Generalized weakness, No joint pain, no muscle pain  GENITOURINARY: ESRD-HD, +dysuria, no frequency, no hesitancy  PSYCHIATRY: no depression , no anxiety  ALL OTHER  ROS negative      Vital Signs Last 24 Hrs  T(C): 37.2 (08-06-23 @ 17:48), Max: 37.4 (08-05-23 @ 20:39)  T(F): 98.9 (08-06-23 @ 17:48), Max: 99.4 (08-05-23 @ 20:39)  HR: 88 (08-06-23 @ 17:48) (82 - 94)  BP: 157/71 (08-06-23 @ 17:48) (146/58 - 173/70)  BP(mean): --  RR: 18 (08-06-23 @ 17:48) (18 - 19)  SpO2: 96% (08-06-23 @ 17:48) (92% - 96%)      ________________________________________________  PHYSICAL EXAM:  GENERAL: NAD  HEENT: Normocephalic;  conjunctivae and sclerae clear; moist mucous membranes;   NECK : supple  CHEST/LUNG: dec breath sounds at bases   HEART: S1 S2  regular; no murmurs, gallops or rubs  ABDOMEN: Soft, Nontender, Nondistended; Bowel sounds present  EXTREMITIES: no cyanosis; no edema; no calf tenderness  SKIN: warm and dry; no rash  NERVOUS SYSTEM:  Awake and alert;   _________________________________________________    LABS:  08-05    144  |  102  |  95<H>  ----------------------------<  109<H>  3.8   |  27  |  16.90<H>    Ca    8.1<L>      05 Aug 2023 12:21      Creatinine Trend: 16.90 <--, 15.40 <--, 16.10 <--, 13.80 <--                        7.2    3.30  )-----------( 86       ( 05 Aug 2023 12:21 )             23.0     Urine Studies:  Urinalysis Basic - ( 05 Aug 2023 12:21 )    Color:  / Appearance:  / SG:  / pH:   Gluc: 109 mg/dL / Ketone:   / Bili:  / Urobili:    Blood:  / Protein:  / Nitrite:    Leuk Esterase:  / RBC:  / WBC    Sq Epi:  / Non Sq Epi:  / Bacteria:                                           RADIOLOGY & ADDITIONAL TESTS:  ACC: [id number] EXAM:  NM HEPATOBILIARY IMG W RX   ORDERED BY: SHAKIR MAC     PROCEDURE DATE:  07/31/2023          INTERPRETATION:  CLINICAL INFORMATION: 62-year-old man with RIGHT UPPER   quadrant pain referred for evaluation of acute cholecystitis.    DURATION of DYNAMIC SERIES: 45 minutes  RADIOPHARMACEUTICAL: 3.2 mCi Tc-99m-Mebrofenin, I.V.    TECHNIQUE: Dynamic imaging of the anterior abdomen was performed   following radiopharmaceutical injection. Patient declined static images.    COMPARISON: None    OTHER STUDIES USED FOR CORRELATION: CT abdomen/pelvis 7/30/2023    FINDINGS: There is prompt, homogeneous uptake of radiopharmaceutical by   the hepatocytes. Activity is seen in the gallbladder at approximately 15   minutes. Patient declined further imaging before bowel was visualized.   There is fair clearance of activity from the liver by the end of the   obtained images..    IMPRESSION: Patient declined to complete the full exam and bowel was not   visualized on the available images. Otherwise unremarkable exam with no   evidence of acute cholecystitis or biliary obstruction.        --- End of Report ---            JD TUCKER MD; Attending Radiologist  This document has been electronically signed. Jul 31 2023 10:59AM    ACC: [id number] EXAM:  CT ABDOMEN AND PELVIS   ORDERED BY: ALEXANDER MATIAS     PROCEDURE DATE:  07/30/2023          INTERPRETATION:  CLINICAL INFORMATION: Worsening nausea and vomiting with   dark colored emesis.    COMPARISON: 7/26/2023    CONTRAST/COMPLICATIONS:  IV Contrast: NONE  Oral Contrast: NONE  Complications: None reported at time of study completion    PROCEDURE:  CT of the Abdomen and Pelvis was performed.  Sagittal and coronal reformats were performed.    FINDINGS: The examination is limited by lack of IV contrast.  LOWER CHEST: Small bilateral pleural effusions. Small bilateral   atelectasis. Nonspecific groundglass densities in the right lower lung   zone; clinical correlation with pneumonia is suggested. Cardiomegaly and   mild pericardial effusion, unchanged.    LIVER: Within normal limits.  BILE DUCTS: Dilated common bile duct again noted.  GALLBLADDER: Small gallstones. Nonspecific trace pericholecystic fluid.  SPLEEN: Within normal limits.  PANCREAS: Dilated main pancreaticduct is again noted.  ADRENALS: The right adrenal appears unremarkable. Nonspecific left   adrenal thickening.  KIDNEYS/URETERS: Within normal limits except for a few small hypodense   lesions in the kidneys bilaterally, representing probable cysts.    BLADDER: Underdistended.  REPRODUCTIVE ORGANS: The prostate and seminal vesicles appear grossly   unremarkable.    BOWEL: No bowel obstruction. Appendix unremarkable. Nonspecific mild   distal esophageal mural thickening.  PERITONEUM: Small ascites. No free air.  VESSELS: Calcified atherosclerotic disease.  RETROPERITONEUM/LYMPH NODES: No lymphadenopathy.  ABDOMINAL WALL: Anasarca again noted.  BONES: No acute CT findings.    IMPRESSION: Small gallstones. Nonspecific trace pericholecystic fluid..   If there is a clinical suspicion for acute cholecystitis, gallbladder   ultrasound/HIDA scan may be pursued for further evaluation.    Stable dilated common bile duct and main pancreatic duct. Please see   previous MRCP dated 2/23/2023.    No bowel obstruction. Appendix unremarkable. Nonspecific mild distal   esophageal mural thickening.    Small ascites.    Anasarca.    Small bilateral pleural effusions. Small bilateral atelectasis.   Nonspecific groundglass densities in the right lower lungzone; clinical   correlation with pneumonia is suggested.    Cardiomegaly and mild pericardial effusion, unchanged.    --- End of Report ---            ROSALBA GREEN MD; Attending Radiologist  This document has been electronically signed. Jul 30 2023  2:23PM      Imaging  Reviewed:  YES/NO    Consultant(s) Notes Reviewed:   YES/ No      Plan of care was discussed with patient and /or primary care giver; all questions and concerns were addressed

## 2023-08-06 NOTE — PROGRESS NOTE ADULT - PROBLEM SELECTOR PLAN 12
DVT PPX: hold heparin for now due to ongoing anemia and concern for possible gastric ulcer    speech and swallow eval  aspiration precautions

## 2023-08-06 NOTE — PROGRESS NOTE ADULT - PROBLEM SELECTOR PLAN 9
Controlled in the community as evidenced by A1c 6.1  Takes 4U Lantus QHS at home  Continue home Lantus regimen  Finger stick QHS

## 2023-08-07 NOTE — PROGRESS NOTE ADULT - ASSESSMENT
# ESRD.  grossly fluid overloaded. Patietnt has opted to resume HD.  only had 25 hrs of HD yesterday.  refused HD today.   continue  TTS schedule- HD in AM   encouraged to stay for full HD treatment.  # anemia of CKD. epogen on HD. transfuse for HBG <7  # Renal osteodystrophy .sevelamer ordered.  # HTN. uncontrolled. can increase hydralazine 5 mg to 10mg three times per day  UF at HD

## 2023-08-07 NOTE — PROGRESS NOTE ADULT - SUBJECTIVE AND OBJECTIVE BOX
[facility] : Progress Note :: [phone], (office [phone]),   Dr Mosher / Dr Washington / Dr Young / Dr Doll / Dr Varsha TURCIOS / Dr Tello / Dr Garcia / Dr Larry qiu  _____________________________________________________________________________________________   events noted, over the weekend, patient's mental status improved. He decided to re-start HD.  only agreed to 25 minutes of HD on Saturday  refusing hD today. wants to resume in the AM.    No Known Drug Allergies  fish (Rash)  liver (Anaphylaxis)    Hospital Medications:   MEDICATIONS  (STANDING):  albuterol/ipratropium for Nebulization 3 milliLiter(s) Nebulizer every 6 hours  amLODIPine   Tablet 10 milliGRAM(s) Oral daily  aspirin enteric coated 81 milliGRAM(s) Oral daily  atorvastatin 40 milliGRAM(s) Oral at bedtime  budesonide 160 MICROgram(s)/formoterol 4.5 MICROgram(s) Inhaler 2 Puff(s) Inhalation two times a day  chlorhexidine 2% Cloths 1 Application(s) Topical daily  dextrose 5%. 1000 milliLiter(s) (50 mL/Hr) IV Continuous <Continuous>  dextrose 5%. 1000 milliLiter(s) (100 mL/Hr) IV Continuous <Continuous>  dextrose 50% Injectable 25 Gram(s) IV Push once  dextrose 50% Injectable 12.5 Gram(s) IV Push once  dextrose 50% Injectable 25 Gram(s) IV Push once  epoetin beverley (PROCRIT) Injectable 05821 Unit(s) IV Push <User Schedule>  folic acid 1 milliGRAM(s) Oral daily  furosemide   Injectable 40 milliGRAM(s) IV Push daily  glucagon  Injectable 1 milliGRAM(s) IntraMuscular once  hydrALAZINE Injectable 5 milliGRAM(s) IV Push three times a day  insulin glargine Injectable (LANTUS) 4 Unit(s) SubCutaneous at bedtime  latanoprost 0.005% Ophthalmic Solution 1 Drop(s) Both EYES at bedtime  levothyroxine 25 MICROGram(s) Oral daily  metoprolol tartrate Injectable 5 milliGRAM(s) IV Push every 12 hours  pantoprazole  Injectable 40 milliGRAM(s) IV Push every 12 hours  sertraline 200 milliGRAM(s) Oral daily  sevelamer carbonate 800 milliGRAM(s) Oral three times a day with meals  sucralfate 1 Gram(s) Oral four times a day        VITALS:  T(F): 98.4 (08-07-23 @ 16:10), Max: 99 (08-06-23 @ 22:20)  HR: 87 (08-07-23 @ 16:10)  BP: 172/92 (08-07-23 @ 16:10)  RR: 16 (08-07-23 @ 16:10)  SpO2: 92% (08-07-23 @ 16:10)  Wt(kg): --      PHYSICAL EXAM:  Constitutional: FACIAL EDEMA.  HEENT: anicteric sclera, oropharynx clear.  Neck: No JVD  Respiratory: CTAB, no wheezes, rales or rhonchi  Cardiovascular: S1, S2, RRR  Gastrointestinal: BS+, soft, NT/ND  Extremities:   peripheral edema=  Neurological: Awake orietnted to place person and time. no focal deficits  Vascular Access: AVF with thrill and bruit    LABS:        Creatinine Trend: 16.90 <--, 15.40 <--, 16.10 <--    Urine Studies:  Urinalysis Basic - ( 05 Aug 2023 12:21 )    Color:  / Appearance:  / SG:  / pH:   Gluc: 109 mg/dL / Ketone:   / Bili:  / Urobili:    Blood:  / Protein:  / Nitrite:    Leuk Esterase:  / RBC:  / WBC    Sq Epi:  / Non Sq Epi:  / Bacteria:         RADIOLOGY & ADDITIONAL STUDIES:

## 2023-08-07 NOTE — PROGRESS NOTE ADULT - PROBLEM SELECTOR PLAN 4
pt p/w intractable Vomiting and coffee ground emesis  CT A/P was neg. for SBO  IV protonix 40 mg BID  c/w zofran PRN  monitor CBC  Surgery following, no surgery  GI following, no endoscopy

## 2023-08-07 NOTE — PROGRESS NOTE ADULT - PROBLEM SELECTOR PLAN 1
possible in setting of pulmonary edema vs aspiration pneumonia vs COPD, resolved   CT A/P noted for Right sided pna  repeat CXR showing worsening CHF  started on cefepime and flagyl   continue 4L oxygen via nasal cannula  c/w duonebs q6h for now  c/w symbicort 160/4.5 mcg 2 puffs bid  maintain oxygen sat. >92%  Nephrology following,  pt noncomplient with dialysis   Pulm. Dr. Loredo on board   ID Dr. Newby follows

## 2023-08-07 NOTE — PROGRESS NOTE ADULT - SUBJECTIVE AND OBJECTIVE BOX
Patient is a 62y old  Male who presents with a chief complaint of Missed dialysis (06 Aug 2023 16:51)    PATIENT IS SEEN AND EXAMINED IN MEDICAL FLOOR.  MARIIT [    ]    JEREMY [   ]      GT [   ]    ALLERGIES:  No Known Drug Allergies  fish (Rash)  liver (Anaphylaxis)      Daily     Daily Weight in k.2 (07 Aug 2023 05:06)    VITALS:    Vital Signs Last 24 Hrs  T(C): 36.2 (07 Aug 2023 05:06), Max: 37.2 (06 Aug 2023 17:48)  T(F): 97.2 (07 Aug 2023 05:06), Max: 99 (06 Aug 2023 22:20)  HR: 79 (07 Aug 2023 06:25) (79 - 92)  BP: 139/60 (07 Aug 2023 06:25) (139/60 - 170/91)  BP(mean): 85 (06 Aug 2023 22:20) (85 - 85)  RR: 18 (07 Aug 2023 06:25) (18 - 19)  SpO2: 92% (07 Aug 2023 06:25) (92% - 99%)    Parameters below as of 07 Aug 2023 06:25  Patient On (Oxygen Delivery Method): nasal cannula  O2 Flow (L/min): 2      LABS:    CBC Full  -  ( 05 Aug 2023 12:21 )  WBC Count : 3.30 K/uL  RBC Count : 2.64 M/uL  Hemoglobin : 7.2 g/dL  Hematocrit : 23.0 %  Platelet Count - Automated : 86 K/uL  Mean Cell Volume : 87.1 fl  Mean Cell Hemoglobin : 27.3 pg  Mean Cell Hemoglobin Concentration : 31.3 gm/dL  Auto Neutrophil # : x  Auto Lymphocyte # : x  Auto Monocyte # : x  Auto Eosinophil # : x  Auto Basophil # : x  Auto Neutrophil % : x  Auto Lymphocyte % : x  Auto Monocyte % : x  Auto Eosinophil % : x  Auto Basophil % : x          144  |  102  |  95<H>  ----------------------------<  109<H>  3.8   |  27  |  16.90<H>    Ca    8.1<L>      05 Aug 2023 12:21      CAPILLARY BLOOD GLUCOSE      POCT Blood Glucose.: 72 mg/dL (07 Aug 2023 07:56)  POCT Blood Glucose.: 66 mg/dL (07 Aug 2023 07:55)  POCT Blood Glucose.: 156 mg/dL (06 Aug 2023 21:24)  POCT Blood Glucose.: 197 mg/dL (06 Aug 2023 16:39)          Creatinine Trend: 16.90<--, 15.40<--, 16.10<--, 13.80<--, 11.30<--, 16.60<--  I&O's Summary          .Blood Blood-Peripheral   @ 14:07   No Growth Final  --  --          MEDICATIONS:    MEDICATIONS  (STANDING):  albuterol/ipratropium for Nebulization 3 milliLiter(s) Nebulizer every 6 hours  amLODIPine   Tablet 10 milliGRAM(s) Oral daily  aspirin enteric coated 81 milliGRAM(s) Oral daily  atorvastatin 40 milliGRAM(s) Oral at bedtime  budesonide 160 MICROgram(s)/formoterol 4.5 MICROgram(s) Inhaler 2 Puff(s) Inhalation two times a day  chlorhexidine 2% Cloths 1 Application(s) Topical daily  dextrose 5%. 1000 milliLiter(s) (50 mL/Hr) IV Continuous <Continuous>  dextrose 5%. 1000 milliLiter(s) (100 mL/Hr) IV Continuous <Continuous>  dextrose 50% Injectable 25 Gram(s) IV Push once  dextrose 50% Injectable 25 Gram(s) IV Push once  dextrose 50% Injectable 12.5 Gram(s) IV Push once  epoetin beverley (PROCRIT) Injectable 95547 Unit(s) IV Push <User Schedule>  folic acid 1 milliGRAM(s) Oral daily  furosemide   Injectable 40 milliGRAM(s) IV Push daily  glucagon  Injectable 1 milliGRAM(s) IntraMuscular once  hydrALAZINE Injectable 5 milliGRAM(s) IV Push three times a day  insulin glargine Injectable (LANTUS) 4 Unit(s) SubCutaneous at bedtime  latanoprost 0.005% Ophthalmic Solution 1 Drop(s) Both EYES at bedtime  levothyroxine 25 MICROGram(s) Oral daily  metoprolol tartrate Injectable 5 milliGRAM(s) IV Push every 12 hours  pantoprazole  Injectable 40 milliGRAM(s) IV Push every 12 hours  sertraline 200 milliGRAM(s) Oral daily  sevelamer carbonate 800 milliGRAM(s) Oral three times a day with meals  sucralfate 1 Gram(s) Oral four times a day      MEDICATIONS  (PRN):  dextrose Oral Gel 15 Gram(s) Oral once PRN Blood Glucose LESS THAN 70 milliGRAM(s)/deciliter  haloperidol    Injectable 0.5 milliGRAM(s) IntraMuscular every 8 hours PRN Agitation  LORazepam   Injectable 0.5 milliGRAM(s) IV Push every 4 hours PRN agitation and nausea  ondansetron   Disintegrating Tablet 4 milliGRAM(s) Oral two times a day PRN Nausea and/or Vomiting  ondansetron Injectable 4 milliGRAM(s) IV Push every 6 hours PRN Nausea and/or Vomiting      REVIEW OF SYSTEMS:                           ALL ROS DONE [ X   ]    CONSTITUTIONAL:  LETHARGIC [   ], FEVER [   ], UNRESPONSIVE [   ]  CVS:  CP  [   ], SOB, [   ], PALPITATIONS [   ], DIZZYNESS [   ]  RS: COUGH [   ], SPUTUM [   ]  GI: ABDOMINAL PAIN [   ], NAUSEA [   ], VOMITINGS [   ], DIARRHEA [   ], CONSTIPATION [   ]  :  DYSURIA [   ], NOCTURIA [   ], INCREASED FREQUENCY [   ], DRIBLING [   ],  SKELETAL: PAINFUL JOINTS [   ], SWOLLEN JOINTS [   ], NECK ACHE [   ], LOW BACK ACHE [   ],  SKIN : ULCERS [   ], RASH [   ], ITCHING [   ]  CNS: HEAD ACHE [   ], DOUBLE VISION [   ], BLURRED VISION [   ], AMS / CONFUSION [   ], SEIZURES [   ], WEAKNESS [   ],TINGLING / NUMBNESS [   ]    PHYSICAL EXAMINATION:  GENERAL APPEARANCE: NO DISTRESS,    ANASARCA ++  HEENT:  NO PALLOR, NO  JVD,  NO   NODES, NECK SUPPLE  CVS: S1 +, S2 +,   RS: AEEB,  OCCASIONAL  RALES +,   CRACKLES+ AT LUNG BASES  ABD: SOFT, NT, NO, BS +  EXT: LEFT BKA  SKIN: WARM,   SKELETAL:  ROM ACCEPTABLE  CNS:  AAO X  3      RADIOLOGY :      ASSESSMENT :     Hypervolemia    No pertinent past medical history    Hypertension    Adrenal insufficiency    CKD (chronic kidney disease)    Anemia    Glaucoma    Coronary artery disease    HLD (hyperlipidemia)    Peripheral vascular disease    Spinal stenosis of lumbosacral region    Hyperparathyroidism    Diabetes mellitus    Diabetic neuropathy    Contracture of hand    Osteoarthritis    Osteoporosis    Vision loss of left eye    ESRD on hemodialysis    Cataract    BPH (benign prostatic hyperplasia)    UTI (urinary tract infection)    Bladder mass    H/O hematuria    Osteoporosis    Vision loss of right eye    Depression    Chronic GERD    Osteomyelitis of vertebra    CHF (congestive heart failure)    No significant past surgical history    Below knee amputation status, left    History of right cataract extraction    History of left cataract extraction    S/P arteriovenous (AV) fistula creation    H/O hematuria    H/O transurethral destruction of bladder lesion    History of excision of mass        PLAN:  HPI:  Patient is 62 year old male from [facility], walks with RW, with PMH of ESRD on HD (TTS), BKA- L w/prosthetic leg, DM, Left eye blindness, CHF, CAD, HTN, HLD, osteoporosis, bronchitis, current smoker, and anemia who presents with complaint of missed dialysis. Last HD . Pt states he often missed HD sessions.  patient states he missed this HD session due to mild pain in left leg, patient remains able to ambulate. Pt complains of right leg swelling and states he does not make any urine. Patient states he was having mild shortness of breath.  Pt denies any fever, chills, N/V/D, constipation, CP, numbness or tingling (2023 19:20)    # CASE DISCUSSED AT LENGTH WITH PATIENT'S BROTHER ARTEMIO @ [phone]. DISCUSSED REGARDING CURRENT CLINICAL CONDITION, RECOMMENDED EVALUATIONS AND INTERVENTIONS. ALL QUESTIONS ANSWERED. DISCUSSED THAT PROGNOSIS IS POOR/GRIM GIVEN UNDERLYING MEDICAL CONDITIONS. ALSO DISCUSSED THAT DESPITE VERBALIZING UNDERSTANDING OF MEDICAL CONDITIONS AND RECOMMENDED INTERVENTIONS - PATIENT PERSISTENTLY REMAINS NONCOMPLIANT. TEAM HAS OFFERED PATIENT EMOTIONAL SUPPORT AND OFFERED MEDICATION FOR MOOD. FAMILY HAS ALSO COUNSELLED PATIENT AT LENGTH AND UNDERSTAND THAT HIS PROGNOSIS IS POOR GIVEN WORSENING CLINICAL CONDITION AND HIS RECURRENT NONCOMPLIANCE. FAMILY ALSO CONVEYS THAT THEY ARE AGREEABLE TO PALLIATIVE CARE DISCUSSION WITH PATIENT AND FAMILY [8/3]   - PALLIATIVE CARE AND PSYCHIATRY RE-CONSULTED. FAMILY IS AGREEABLE.       # PATIENT W/ HISTORY OF ROUTINE NONCOMPLIANCE W/ LIFE-SUSTAINING TREATMENT [HD], EVALUATIONS AND INTERVENTIONS - COUNSELLED AT LENGTH TO REMAIN COMPLIANT. D/W PATIENT THAT PROGNOSIS IS GRIM/POOR. HE VERBALIZED UNDERSTANDING. REGARDING GOC - PATIENT WISHES FOR FULL CODE. PALLIATIVE CARE CONSULTED. PSYCHIATRY CONSULTED.  - PATIENT IS ORIENTED TO PERSON, PLACE AND CIRCUMSTANCE. HE EXPRESSED THAT HE DOES GROW IMPATIENT DURING DIALYSIS SESSIONS AND HAS BEEN LEAVING SESSIONS SOONER THAN PRESCRIBED. IN DISCUSSION THAT RISKS OF DEFERRING COMPLETE HD - INCLUDE BUT ARE NOT LIMITED TO WORSENING CLINICAL CONDITION, ELECTROLYTE ABNORMALITIES, ARRHYTHMIA AND DEATH. HE VERBALIZED UNDERSTANDING. HE REFUSES TO CONSIDER A NURSING HOME WITH ONSITE HD.   - D/W PATIENT THAT REPEATEDLY REFUSING INTERVENTIONS AND ASSESSMENTS IS NOT IN LINE WITH HIS WISHES TO IMPROVE. PATIENT VERBALIZED UNDERSTANDING AND AGREEMENT. IN DISCUSSION, REGARDING POSSIBLE ETIOLOGY - PSYCHIATRY WAS CONSULTED TO DISCUSS MOOD, PATIENT DOES EXPRESS THAT HE HAS SOME DEPRESSION GIVEN HIS MEDICAL CONDITIONS. BUT HE DENIES THAT THIS IS THE ETIOLOGY FOR NONCOMPLIANCE. HE EXPLAINS THAT HE DOES NOT LIKE BEING CONFINED TO A DIALYSIS MACHINE. OFFERED FOR PATIENT TO CONSIDER NURSING HOME W/ ONSITE HD. PATIENT WISHES TO CONTINUE TO LIVE IN ASSISTED LIVING.    - [] - PATIENT REFUSING HD, REMOVED IV LINE, REFUSING MEDICATION - COUNSELLED BY TEAM AND BY FAMILY - THAT RISKS OF NONCOMPLIANCE INCLUDE BUT ARE NOT LIMITED TO WORSENING CLINICAL CONDITION AND DEATH. PATIENT VERBALIZED UNDERSTANDING, HOWEVER SHORTLY THEREAFTER REMOVED IV LINE. FAMILY ALSO COUNSELLING PATIENT TO COMPLY. PROGNOSIS IS POOR. PATIENT AND FAMILY VERBALIZE UNDERSTANDING. PALLIATIVE CARE CONSULT IN PROGRESS    # ACUTE ON CHRONIC HYPOXIC RESPIRATORY FAILURE S/T PULMONARY EDEMA - S/T INCOMPLETE HD SESSIONS ; ANASARCA  # HYPERKALEMIA  # HX OF COPD + S/P RECENT MULTIFOCAL PNEUMONIA ; R/O ASPIRATION PNEUMONIA  # PULMONARY HYPERTENSION  # UNCONTROLLED HTN  # ESRD ON HD TTS - W/ HX OF NONCOMPLIANCE    - TELEMETRY  - HYDRALAZINE, METOPROLOL AND NORVASC ; PRN IV ANTIHYPERTENSIVE  - LOKELMA  - SUPPLEMENTAL O2  - PLANNED FOR HD  - NEPHROLOGY CONSULT  - PULMONOLOGY CONSULT  - ID CONSULT    - PLANNED FOR HD ,  [INCOMPLETE SESSIONS],    - REFUSED HD ON ,   - UNDERWENT HD  [COMPLETE SESSION]      - [] - OVERNIGHT RAPID RESPONSE S/T HYPOXIA - SELF-LIMITED - PATIENT IMPROVED S/P O2 SUPPLEMENT  - EMPIRICALLY STARTED ON CEFEPIME + FLAGYLL, F/U BCX       # RECURRENT INTRACTABLE NAUSEA/VOMITING, ? COFFEE GROUND EMESIS  # GASTROPARESIS  # HISTORY OF ESOPHAGITIS AND DUODENITIS [2021], HX OF GASTROPARESIS W/ EVIDENCE OF THICKENED ESOPHAGUS ON PREVIOUS CT SCAN   # PANCREAS W/ DOUBLE DUCT SIGN    - MONITORING HGB, PLACED ON PPI BID , CARAFATE QID  - PRN ANTIEMETICS  ; S/P DOSE OF REGLAN [WITH MINIMAL IMPROVEMENT]  - MONITORING FOR SYMPTOMS  - WHILE ON DIET PATIENT REPEATEDLY COUNSELLED TO CHEW FOOD CAUTIOUSLY AND CONSUME SMALL BITES W/ REGULAR CLEARING WITH WATER BETWEEN BITES    - TRIAL OF CLEAR LIQUID DIET  - NOTED CT A/P    - S/P RECENT PRBC TRANSFUSION     - GI CONSULT  - SURGERY CONSULT    # LESS LIKELY CHOLECYSTITIS  - PLACED ON CEFEPIME, F/U BCX  - NOTED CT A/P  - HIDA SCAN - NEGATIVE  - SURGERY CONSULT    # ELEVATED TROPONINS - ? DEMAND ISCHEMIA    - S/P TELEMETRY  - TREND TROPONINS  - ECHO - TRACE MR, MODERATELY INCREASED LV WALL THICKNESS, NORMAL LV SYSTOLIC FUNCTION, SEVERE PULMONARY HTN  - CARDIOLOGY CONSULT    # GASTROPARESIS  # UNDERLYING DM  - SSI + FS  - COUNSELLED TO COMPLY WITH HD TO REDUCE UREMIA      # DEPRESSION  - PLACED ON ZOLOFT  - PSYCHIATRY CONSULT    # PATIENT UNDERGOING OUTPATIENT WORKUP FOR CENTRAL VENOUS STENOSIS THROUGH NEPHROLOGY TEAM  - ? s/p STENT PLACEMENT    # ANEMIA OF CKD  # HX OF PANCYTOPENIA   - TREND HGB, TRANSFUSION THRESHOLD HGB < 7; PATIENT STILL INTERMITTENTLY REFUSING BLOODWORK, COUNSELLED TO COMPLY  - TYPE AND SCREEN  - ON EPO  - DENIES RECENT HEMATEMESIS, MELENA, HEMATOCHEZIA    - S/P RECENT PRBC TRANSFUSION  - S/P PRBC TRANSFUSION     - PPI BID, CARAFATE QID    # SEVERE PROTEIN CALORIE MALNUTRITION, FAILURE TO THRIVE   -  TEMPORAL WASTING, LOSS OF MUSCLE MASS FROM SHOULDER AND HIP GIRDLE  - NUTRITIONAL SUPPLEMENT    # HLD  # PARTIALLY BLIND  # S/P LEFT BKA  # HX OF PVD  # LS SPINAL STENOSIS  # GI AND DVT PPX     Patient is a 62y old  Male who presents with a chief complaint of Missed dialysis (06 Aug 2023 16:51)    PATIENT IS SEEN AND EXAMINED IN MEDICAL FLOOR.      ALLERGIES:  No Known Drug Allergies  fish (Rash)  liver (Anaphylaxis)      Daily     Daily Weight in k.2 (07 Aug 2023 05:06)    VITALS:    Vital Signs Last 24 Hrs  T(C): 36.2 (07 Aug 2023 05:06), Max: 37.2 (06 Aug 2023 17:48)  T(F): 97.2 (07 Aug 2023 05:06), Max: 99 (06 Aug 2023 22:20)  HR: 79 (07 Aug 2023 06:25) (79 - 92)  BP: 139/60 (07 Aug 2023 06:25) (139/60 - 170/91)  BP(mean): 85 (06 Aug 2023 22:20) (85 - 85)  RR: 18 (07 Aug 2023 06:25) (18 - 19)  SpO2: 92% (07 Aug 2023 06:25) (92% - 99%)    Parameters below as of 07 Aug 2023 06:25  Patient On (Oxygen Delivery Method): nasal cannula  O2 Flow (L/min): 2      LABS:    CBC Full  -  ( 05 Aug 2023 12:21 )  WBC Count : 3.30 K/uL  RBC Count : 2.64 M/uL  Hemoglobin : 7.2 g/dL  Hematocrit : 23.0 %  Platelet Count - Automated : 86 K/uL  Mean Cell Volume : 87.1 fl  Mean Cell Hemoglobin : 27.3 pg  Mean Cell Hemoglobin Concentration : 31.3 gm/dL  Auto Neutrophil # : x  Auto Lymphocyte # : x  Auto Monocyte # : x  Auto Eosinophil # : x  Auto Basophil # : x  Auto Neutrophil % : x  Auto Lymphocyte % : x  Auto Monocyte % : x  Auto Eosinophil % : x  Auto Basophil % : x          144  |  102  |  95<H>  ----------------------------<  109<H>  3.8   |  27  |  16.90<H>    Ca    8.1<L>      05 Aug 2023 12:21      CAPILLARY BLOOD GLUCOSE      POCT Blood Glucose.: 72 mg/dL (07 Aug 2023 07:56)  POCT Blood Glucose.: 66 mg/dL (07 Aug 2023 07:55)  POCT Blood Glucose.: 156 mg/dL (06 Aug 2023 21:24)  POCT Blood Glucose.: 197 mg/dL (06 Aug 2023 16:39)          Creatinine Trend: 16.90<--, 15.40<--, 16.10<--, 13.80<--, 11.30<--, 16.60<--  I&O's Summary          .Blood Blood-Peripheral   @ 14:07   No Growth Final  --  --          MEDICATIONS:    MEDICATIONS  (STANDING):  albuterol/ipratropium for Nebulization 3 milliLiter(s) Nebulizer every 6 hours  amLODIPine   Tablet 10 milliGRAM(s) Oral daily  aspirin enteric coated 81 milliGRAM(s) Oral daily  atorvastatin 40 milliGRAM(s) Oral at bedtime  budesonide 160 MICROgram(s)/formoterol 4.5 MICROgram(s) Inhaler 2 Puff(s) Inhalation two times a day  chlorhexidine 2% Cloths 1 Application(s) Topical daily  dextrose 5%. 1000 milliLiter(s) (50 mL/Hr) IV Continuous <Continuous>  dextrose 5%. 1000 milliLiter(s) (100 mL/Hr) IV Continuous <Continuous>  dextrose 50% Injectable 25 Gram(s) IV Push once  dextrose 50% Injectable 25 Gram(s) IV Push once  dextrose 50% Injectable 12.5 Gram(s) IV Push once  epoetin beverley (PROCRIT) Injectable 69485 Unit(s) IV Push <User Schedule>  folic acid 1 milliGRAM(s) Oral daily  furosemide   Injectable 40 milliGRAM(s) IV Push daily  glucagon  Injectable 1 milliGRAM(s) IntraMuscular once  hydrALAZINE Injectable 5 milliGRAM(s) IV Push three times a day  insulin glargine Injectable (LANTUS) 4 Unit(s) SubCutaneous at bedtime  latanoprost 0.005% Ophthalmic Solution 1 Drop(s) Both EYES at bedtime  levothyroxine 25 MICROGram(s) Oral daily  metoprolol tartrate Injectable 5 milliGRAM(s) IV Push every 12 hours  pantoprazole  Injectable 40 milliGRAM(s) IV Push every 12 hours  sertraline 200 milliGRAM(s) Oral daily  sevelamer carbonate 800 milliGRAM(s) Oral three times a day with meals  sucralfate 1 Gram(s) Oral four times a day      MEDICATIONS  (PRN):  dextrose Oral Gel 15 Gram(s) Oral once PRN Blood Glucose LESS THAN 70 milliGRAM(s)/deciliter  haloperidol    Injectable 0.5 milliGRAM(s) IntraMuscular every 8 hours PRN Agitation  LORazepam   Injectable 0.5 milliGRAM(s) IV Push every 4 hours PRN agitation and nausea  ondansetron   Disintegrating Tablet 4 milliGRAM(s) Oral two times a day PRN Nausea and/or Vomiting  ondansetron Injectable 4 milliGRAM(s) IV Push every 6 hours PRN Nausea and/or Vomiting      REVIEW OF SYSTEMS:                           ALL ROS DONE [ X   ]    CONSTITUTIONAL:  LETHARGIC [   ], FEVER [   ], UNRESPONSIVE [   ]  CVS:  CP  [   ], SOB, [   ], PALPITATIONS [   ], DIZZYNESS [   ]  RS: COUGH [   ], SPUTUM [   ]  GI: ABDOMINAL PAIN [   ], NAUSEA [   ], VOMITINGS [   ], DIARRHEA [   ], CONSTIPATION [   ]  :  DYSURIA [   ], NOCTURIA [   ], INCREASED FREQUENCY [   ], DRIBLING [   ],  SKELETAL: PAINFUL JOINTS [   ], SWOLLEN JOINTS [   ], NECK ACHE [   ], LOW BACK ACHE [   ],  SKIN : ULCERS [   ], RASH [   ], ITCHING [   ]  CNS: HEAD ACHE [   ], DOUBLE VISION [   ], BLURRED VISION [   ], AMS / CONFUSION [   ], SEIZURES [   ], WEAKNESS [   ],TINGLING / NUMBNESS [   ]    PHYSICAL EXAMINATION:  GENERAL APPEARANCE: NO DISTRESS,    ANASARCA ++  HEENT:  NO PALLOR, NO  JVD,  NO   NODES, NECK SUPPLE  CVS: S1 +, S2 +,   RS: AEEB,  OCCASIONAL  RALES +,   CRACKLES+ AT LUNG BASES  ABD: SOFT, NT, NO, BS +  EXT: LEFT BKA  SKIN: WARM,   SKELETAL:  ROM ACCEPTABLE  CNS:  AAO X  2-3      RADIOLOGY :    RADIOLOGY AND READINGS REVIEWED    ASSESSMENT :     Hypervolemia    No pertinent past medical history    Hypertension    Adrenal insufficiency    CKD (chronic kidney disease)    Anemia    Glaucoma    Coronary artery disease    HLD (hyperlipidemia)    Peripheral vascular disease    Spinal stenosis of lumbosacral region    Hyperparathyroidism    Diabetes mellitus    Diabetic neuropathy    Contracture of hand    Osteoarthritis    Osteoporosis    Vision loss of left eye    ESRD on hemodialysis    Cataract    BPH (benign prostatic hyperplasia)    UTI (urinary tract infection)    Bladder mass    H/O hematuria    Osteoporosis    Vision loss of right eye    Depression    Chronic GERD    Osteomyelitis of vertebra    CHF (congestive heart failure)    No significant past surgical history    Below knee amputation status, left    History of right cataract extraction    History of left cataract extraction    S/P arteriovenous (AV) fistula creation    H/O hematuria    H/O transurethral destruction of bladder lesion    History of excision of mass        PLAN:  HPI:  Patient is 62 year old male from [facility], walks with RW, with PMH of ESRD on HD (TTS), BKA- L w/prosthetic leg, DM, Left eye blindness, CHF, CAD, HTN, HLD, osteoporosis, bronchitis, current smoker, and anemia who presents with complaint of missed dialysis. Last HD . Pt states he often missed HD sessions.  patient states he missed this HD session due to mild pain in left leg, patient remains able to ambulate. Pt complains of right leg swelling and states he does not make any urine. Patient states he was having mild shortness of breath.  Pt denies any fever, chills, N/V/D, constipation, CP, numbness or tingling (2023 19:20)    # CASE DISCUSSED AT LENGTH WITH PATIENT'S BROTHER ARTEMIO @ [phone]. DISCUSSED REGARDING CURRENT CLINICAL CONDITION, RECOMMENDED EVALUATIONS AND INTERVENTIONS. ALL QUESTIONS ANSWERED. DISCUSSED THAT PROGNOSIS IS POOR/GRIM GIVEN UNDERLYING MEDICAL CONDITIONS. ALSO DISCUSSED THAT DESPITE VERBALIZING UNDERSTANDING OF MEDICAL CONDITIONS AND RECOMMENDED INTERVENTIONS - PATIENT PERSISTENTLY REMAINS NONCOMPLIANT. TEAM HAS OFFERED PATIENT EMOTIONAL SUPPORT AND OFFERED MEDICATION FOR MOOD. FAMILY HAS ALSO COUNSELLED PATIENT AT LENGTH AND UNDERSTAND THAT HIS PROGNOSIS IS POOR GIVEN WORSENING CLINICAL CONDITION AND HIS RECURRENT NONCOMPLIANCE. FAMILY ALSO CONVEYS THAT THEY ARE AGREEABLE TO PALLIATIVE CARE DISCUSSION WITH PATIENT AND FAMILY [8/3]   - PALLIATIVE CARE AND PSYCHIATRY RE-CONSULTED. FAMILY IS AGREEABLE.       # PATIENT W/ HISTORY OF ROUTINE NONCOMPLIANCE W/ LIFE-SUSTAINING TREATMENT [HD], EVALUATIONS AND INTERVENTIONS - COUNSELLED AT LENGTH TO REMAIN COMPLIANT. D/W PATIENT THAT PROGNOSIS IS GRIM/POOR. HE VERBALIZED UNDERSTANDING. REGARDING GOC - PATIENT WISHES FOR FULL CODE. PALLIATIVE CARE CONSULTED. PSYCHIATRY CONSULTED.  - PATIENT IS ORIENTED TO PERSON, PLACE AND CIRCUMSTANCE. HE EXPRESSED THAT HE DOES GROW IMPATIENT DURING DIALYSIS SESSIONS AND HAS BEEN LEAVING SESSIONS SOONER THAN PRESCRIBED. IN DISCUSSION THAT RISKS OF DEFERRING COMPLETE HD - INCLUDE BUT ARE NOT LIMITED TO WORSENING CLINICAL CONDITION, ELECTROLYTE ABNORMALITIES, ARRHYTHMIA AND DEATH. HE VERBALIZED UNDERSTANDING. HE REFUSES TO CONSIDER A NURSING HOME WITH ONSITE HD.   - D/W PATIENT THAT REPEATEDLY REFUSING INTERVENTIONS AND ASSESSMENTS IS NOT IN LINE WITH HIS WISHES TO IMPROVE. PATIENT VERBALIZED UNDERSTANDING AND AGREEMENT. IN DISCUSSION, REGARDING POSSIBLE ETIOLOGY - PSYCHIATRY WAS CONSULTED TO DISCUSS MOOD, PATIENT DOES EXPRESS THAT HE HAS SOME DEPRESSION GIVEN HIS MEDICAL CONDITIONS. BUT HE DENIES THAT THIS IS THE ETIOLOGY FOR NONCOMPLIANCE. HE EXPLAINS THAT HE DOES NOT LIKE BEING CONFINED TO A DIALYSIS MACHINE. OFFERED FOR PATIENT TO CONSIDER NURSING HOME W/ ONSITE HD. PATIENT WISHES TO CONTINUE TO LIVE IN ASSISTED LIVING.    - [] - PATIENT CONTINUES TO BE NONCOMPLIANT WITH HD SESSIONS, REQUESTING TERMINATION OF SESSIONS SHORTLY AFTER INITIATION OR REFUSING TO PARTICIPATE IN HD WHEN ORDERED. PATIENT AND FAMILY HAVE BEEN COUNSELLED AT LENGTH ABOUT THE RISKS OF NONCOMPLIANCE WITH MEDICAL CARE. PALLIATIVE CARE CONSULT IN PROGRESS    # ACUTE ON CHRONIC HYPOXIC RESPIRATORY FAILURE S/T PULMONARY EDEMA - S/T INCOMPLETE HD SESSIONS ; ANASARCA  # HYPERKALEMIA  # HX OF COPD + S/P RECENT MULTIFOCAL PNEUMONIA ; R/O ASPIRATION PNEUMONIA  # PULMONARY HYPERTENSION  # UNCONTROLLED HTN  # ESRD ON HD TTS - W/ HX OF NONCOMPLIANCE    - TELEMETRY  - HYDRALAZINE, METOPROLOL AND NORVASC ; PRN IV ANTIHYPERTENSIVE  - LOKELMA  - SUPPLEMENTAL O2  - PLANNED FOR HD  - NEPHROLOGY CONSULT  - PULMONOLOGY CONSULT  - ID CONSULT    - CONTINUES TO REFUSE OR PRE-MATURELY DISCONTINUE SESSIONS OF HD      - [] - OVERNIGHT RAPID RESPONSE S/T HYPOXIA - SELF-LIMITED - PATIENT IMPROVED S/P O2 SUPPLEMENT  - EMPIRICALLY STARTED ON CEFEPIME + FLAGYLL, F/U BCX       # RECURRENT INTRACTABLE NAUSEA/VOMITING, ? COFFEE GROUND EMESIS  # GASTROPARESIS  # HISTORY OF ESOPHAGITIS AND DUODENITIS [2021], HX OF GASTROPARESIS W/ EVIDENCE OF THICKENED ESOPHAGUS ON PREVIOUS CT SCAN   # PANCREAS W/ DOUBLE DUCT SIGN    - MONITORING HGB, PLACED ON PPI BID , CARAFATE QID  - PRN ANTIEMETICS  ; S/P DOSE OF REGLAN [WITH MINIMAL IMPROVEMENT]  - MONITORING FOR SYMPTOMS  - WHILE ON DIET PATIENT REPEATEDLY COUNSELLED TO CHEW FOOD CAUTIOUSLY AND CONSUME SMALL BITES W/ REGULAR CLEARING WITH WATER BETWEEN BITES    - ADVANCE DIET AS TOLERATED  - NOTED CT A/P    - S/P RECENT PRBC TRANSFUSION     - GI CONSULT  - SURGERY CONSULT    # LESS LIKELY CHOLECYSTITIS  - ON ABX  - NOTED CT A/P  - HIDA SCAN - NEGATIVE  - SURGERY CONSULT    # ELEVATED TROPONINS - ? DEMAND ISCHEMIA    - S/P TELEMETRY  - TREND TROPONINS  - ECHO - TRACE MR, MODERATELY INCREASED LV WALL THICKNESS, NORMAL LV SYSTOLIC FUNCTION, SEVERE PULMONARY HTN  - CARDIOLOGY CONSULT    # GASTROPARESIS  # UNDERLYING DM  - SSI + FS  - COUNSELLED TO COMPLY WITH HD TO REDUCE UREMIA      # DEPRESSION  - PLACED ON ZOLOFT  - PSYCHIATRY CONSULT    # PATIENT UNDERGOING OUTPATIENT WORKUP FOR CENTRAL VENOUS STENOSIS THROUGH NEPHROLOGY TEAM  - ? s/p STENT PLACEMENT    # ANEMIA OF CKD  # HX OF PANCYTOPENIA   - TREND HGB, TRANSFUSION THRESHOLD HGB < 7; PATIENT STILL INTERMITTENTLY REFUSING BLOODWORK, COUNSELLED TO COMPLY  - TYPE AND SCREEN  - ON EPO  - DENIES RECENT HEMATEMESIS, MELENA, HEMATOCHEZIA    - S/P RECENT PRBC TRANSFUSION  - S/P PRBC TRANSFUSION     - PPI BID, CARAFATE QID    # SEVERE PROTEIN CALORIE MALNUTRITION, FAILURE TO THRIVE   -  TEMPORAL WASTING, LOSS OF MUSCLE MASS FROM SHOULDER AND HIP GIRDLE  - NUTRITIONAL SUPPLEMENT    # HLD  # PARTIALLY BLIND  # S/P LEFT BKA  # HX OF PVD  # LS SPINAL STENOSIS  # GI AND DVT PPX     Patient is a 62y old  Male who presents with a chief complaint of Missed dialysis (06 Aug 2023 16:51)    PATIENT IS SEEN AND EXAMINED IN MEDICAL FLOOR.      ALLERGIES:  No Known Drug Allergies  fish (Rash)  liver (Anaphylaxis)      Daily     Daily Weight in k.2 (07 Aug 2023 05:06)    VITALS:    Vital Signs Last 24 Hrs  T(C): 36.2 (07 Aug 2023 05:06), Max: 37.2 (06 Aug 2023 17:48)  T(F): 97.2 (07 Aug 2023 05:06), Max: 99 (06 Aug 2023 22:20)  HR: 79 (07 Aug 2023 06:25) (79 - 92)  BP: 139/60 (07 Aug 2023 06:25) (139/60 - 170/91)  BP(mean): 85 (06 Aug 2023 22:20) (85 - 85)  RR: 18 (07 Aug 2023 06:25) (18 - 19)  SpO2: 92% (07 Aug 2023 06:25) (92% - 99%)    Parameters below as of 07 Aug 2023 06:25  Patient On (Oxygen Delivery Method): nasal cannula  O2 Flow (L/min): 2      LABS:    CBC Full  -  ( 05 Aug 2023 12:21 )  WBC Count : 3.30 K/uL  RBC Count : 2.64 M/uL  Hemoglobin : 7.2 g/dL  Hematocrit : 23.0 %  Platelet Count - Automated : 86 K/uL  Mean Cell Volume : 87.1 fl  Mean Cell Hemoglobin : 27.3 pg  Mean Cell Hemoglobin Concentration : 31.3 gm/dL  Auto Neutrophil # : x  Auto Lymphocyte # : x  Auto Monocyte # : x  Auto Eosinophil # : x  Auto Basophil # : x  Auto Neutrophil % : x  Auto Lymphocyte % : x  Auto Monocyte % : x  Auto Eosinophil % : x  Auto Basophil % : x          144  |  102  |  95<H>  ----------------------------<  109<H>  3.8   |  27  |  16.90<H>    Ca    8.1<L>      05 Aug 2023 12:21      CAPILLARY BLOOD GLUCOSE      POCT Blood Glucose.: 72 mg/dL (07 Aug 2023 07:56)  POCT Blood Glucose.: 66 mg/dL (07 Aug 2023 07:55)  POCT Blood Glucose.: 156 mg/dL (06 Aug 2023 21:24)  POCT Blood Glucose.: 197 mg/dL (06 Aug 2023 16:39)          Creatinine Trend: 16.90<--, 15.40<--, 16.10<--, 13.80<--, 11.30<--, 16.60<--  I&O's Summary          .Blood Blood-Peripheral   @ 14:07   No Growth Final  --  --          MEDICATIONS:    MEDICATIONS  (STANDING):  albuterol/ipratropium for Nebulization 3 milliLiter(s) Nebulizer every 6 hours  amLODIPine   Tablet 10 milliGRAM(s) Oral daily  aspirin enteric coated 81 milliGRAM(s) Oral daily  atorvastatin 40 milliGRAM(s) Oral at bedtime  budesonide 160 MICROgram(s)/formoterol 4.5 MICROgram(s) Inhaler 2 Puff(s) Inhalation two times a day  chlorhexidine 2% Cloths 1 Application(s) Topical daily  dextrose 5%. 1000 milliLiter(s) (50 mL/Hr) IV Continuous <Continuous>  dextrose 5%. 1000 milliLiter(s) (100 mL/Hr) IV Continuous <Continuous>  dextrose 50% Injectable 25 Gram(s) IV Push once  dextrose 50% Injectable 25 Gram(s) IV Push once  dextrose 50% Injectable 12.5 Gram(s) IV Push once  epoetin beverley (PROCRIT) Injectable 61032 Unit(s) IV Push <User Schedule>  folic acid 1 milliGRAM(s) Oral daily  furosemide   Injectable 40 milliGRAM(s) IV Push daily  glucagon  Injectable 1 milliGRAM(s) IntraMuscular once  hydrALAZINE Injectable 5 milliGRAM(s) IV Push three times a day  insulin glargine Injectable (LANTUS) 4 Unit(s) SubCutaneous at bedtime  latanoprost 0.005% Ophthalmic Solution 1 Drop(s) Both EYES at bedtime  levothyroxine 25 MICROGram(s) Oral daily  metoprolol tartrate Injectable 5 milliGRAM(s) IV Push every 12 hours  pantoprazole  Injectable 40 milliGRAM(s) IV Push every 12 hours  sertraline 200 milliGRAM(s) Oral daily  sevelamer carbonate 800 milliGRAM(s) Oral three times a day with meals  sucralfate 1 Gram(s) Oral four times a day      MEDICATIONS  (PRN):  dextrose Oral Gel 15 Gram(s) Oral once PRN Blood Glucose LESS THAN 70 milliGRAM(s)/deciliter  haloperidol    Injectable 0.5 milliGRAM(s) IntraMuscular every 8 hours PRN Agitation  LORazepam   Injectable 0.5 milliGRAM(s) IV Push every 4 hours PRN agitation and nausea  ondansetron   Disintegrating Tablet 4 milliGRAM(s) Oral two times a day PRN Nausea and/or Vomiting  ondansetron Injectable 4 milliGRAM(s) IV Push every 6 hours PRN Nausea and/or Vomiting      REVIEW OF SYSTEMS:                           ALL ROS DONE [ X   ]    CONSTITUTIONAL:  LETHARGIC [   ], FEVER [   ], UNRESPONSIVE [   ]  CVS:  CP  [   ], SOB, [   ], PALPITATIONS [   ], DIZZYNESS [   ]  RS: COUGH [   ], SPUTUM [   ]  GI: ABDOMINAL PAIN [   ], NAUSEA [   ], VOMITINGS [   ], DIARRHEA [   ], CONSTIPATION [   ]  :  DYSURIA [   ], NOCTURIA [   ], INCREASED FREQUENCY [   ], DRIBLING [   ],  SKELETAL: PAINFUL JOINTS [   ], SWOLLEN JOINTS [   ], NECK ACHE [   ], LOW BACK ACHE [   ],  SKIN : ULCERS [   ], RASH [   ], ITCHING [   ]  CNS: HEAD ACHE [   ], DOUBLE VISION [   ], BLURRED VISION [   ], AMS / CONFUSION [   ], SEIZURES [   ], WEAKNESS [   ],TINGLING / NUMBNESS [   ]    PHYSICAL EXAMINATION:  GENERAL APPEARANCE: NO DISTRESS,    ANASARCA ++  HEENT:  NO PALLOR, NO  JVD,  NO   NODES, NECK SUPPLE  CVS: S1 +, S2 +,   RS: AEEB,  OCCASIONAL  RALES +,   CRACKLES+ AT LUNG BASES  ABD: SOFT, NT, NO, BS +  EXT: LEFT BKA  SKIN: WARM,   SKELETAL:  ROM ACCEPTABLE  CNS:  AAO X  2-3      RADIOLOGY :    RADIOLOGY AND READINGS REVIEWED    ASSESSMENT :     Hypervolemia    No pertinent past medical history    Hypertension    Adrenal insufficiency    CKD (chronic kidney disease)    Anemia    Glaucoma    Coronary artery disease    HLD (hyperlipidemia)    Peripheral vascular disease    Spinal stenosis of lumbosacral region    Hyperparathyroidism    Diabetes mellitus    Diabetic neuropathy    Contracture of hand    Osteoarthritis    Osteoporosis    Vision loss of left eye    ESRD on hemodialysis    Cataract    BPH (benign prostatic hyperplasia)    UTI (urinary tract infection)    Bladder mass    H/O hematuria    Osteoporosis    Vision loss of right eye    Depression    Chronic GERD    Osteomyelitis of vertebra    CHF (congestive heart failure)    No significant past surgical history    Below knee amputation status, left    History of right cataract extraction    History of left cataract extraction    S/P arteriovenous (AV) fistula creation    H/O hematuria    H/O transurethral destruction of bladder lesion    History of excision of mass        PLAN:  HPI:  Patient is 62 year old male from [facility], walks with RW, with PMH of ESRD on HD (TTS), BKA- L w/prosthetic leg, DM, Left eye blindness, CHF, CAD, HTN, HLD, osteoporosis, bronchitis, current smoker, and anemia who presents with complaint of missed dialysis. Last HD . Pt states he often missed HD sessions.  patient states he missed this HD session due to mild pain in left leg, patient remains able to ambulate. Pt complains of right leg swelling and states he does not make any urine. Patient states he was having mild shortness of breath.  Pt denies any fever, chills, N/V/D, constipation, CP, numbness or tingling (2023 19:20)    # CASE DISCUSSED AT LENGTH WITH PATIENT'S BROTHER ARTEMIO @ [phone]. DISCUSSED REGARDING CURRENT CLINICAL CONDITION, RECOMMENDED EVALUATIONS AND INTERVENTIONS. ALL QUESTIONS ANSWERED. DISCUSSED THAT PROGNOSIS IS POOR/GRIM GIVEN UNDERLYING MEDICAL CONDITIONS. ALSO DISCUSSED THAT DESPITE VERBALIZING UNDERSTANDING OF MEDICAL CONDITIONS AND RECOMMENDED INTERVENTIONS - PATIENT PERSISTENTLY REMAINS NONCOMPLIANT. TEAM HAS OFFERED PATIENT EMOTIONAL SUPPORT AND OFFERED MEDICATION FOR MOOD. FAMILY HAS ALSO COUNSELLED PATIENT AT LENGTH AND UNDERSTAND THAT HIS PROGNOSIS IS POOR GIVEN WORSENING CLINICAL CONDITION AND HIS RECURRENT NONCOMPLIANCE. FAMILY ALSO CONVEYS THAT THEY ARE AGREEABLE TO PALLIATIVE CARE DISCUSSION WITH PATIENT AND FAMILY [8/3]   - PALLIATIVE CARE AND PSYCHIATRY RE-CONSULTED. FAMILY IS AGREEABLE.       # PATIENT W/ HISTORY OF ROUTINE NONCOMPLIANCE W/ LIFE-SUSTAINING TREATMENT [HD], EVALUATIONS AND INTERVENTIONS - COUNSELLED AT LENGTH TO REMAIN COMPLIANT. D/W PATIENT THAT PROGNOSIS IS GRIM/POOR. HE VERBALIZED UNDERSTANDING. REGARDING GOC - PATIENT WISHES FOR FULL CODE. PALLIATIVE CARE CONSULTED. PSYCHIATRY CONSULTED.  - PATIENT IS ORIENTED TO PERSON, PLACE AND CIRCUMSTANCE. HE EXPRESSED THAT HE DOES GROW IMPATIENT DURING DIALYSIS SESSIONS AND HAS BEEN LEAVING SESSIONS SOONER THAN PRESCRIBED. IN DISCUSSION THAT RISKS OF DEFERRING COMPLETE HD - INCLUDE BUT ARE NOT LIMITED TO WORSENING CLINICAL CONDITION, ELECTROLYTE ABNORMALITIES, ARRHYTHMIA AND DEATH. HE VERBALIZED UNDERSTANDING. HE REFUSES TO CONSIDER A NURSING HOME WITH ONSITE HD.   - D/W PATIENT THAT REPEATEDLY REFUSING INTERVENTIONS AND ASSESSMENTS IS NOT IN LINE WITH HIS WISHES TO IMPROVE. PATIENT VERBALIZED UNDERSTANDING AND AGREEMENT. IN DISCUSSION, REGARDING POSSIBLE ETIOLOGY - PSYCHIATRY WAS CONSULTED TO DISCUSS MOOD, PATIENT DOES EXPRESS THAT HE HAS SOME DEPRESSION GIVEN HIS MEDICAL CONDITIONS. BUT HE DENIES THAT THIS IS THE ETIOLOGY FOR NONCOMPLIANCE. HE EXPLAINS THAT HE DOES NOT LIKE BEING CONFINED TO A DIALYSIS MACHINE. OFFERED FOR PATIENT TO CONSIDER NURSING HOME W/ ONSITE HD. PATIENT WISHES TO CONTINUE TO LIVE IN ASSISTED LIVING.    - [] - PATIENT CONTINUES TO BE NONCOMPLIANT WITH HD SESSIONS, REQUESTING TERMINATION OF SESSIONS SHORTLY AFTER INITIATION OR REFUSING TO PARTICIPATE IN HD WHEN ORDERED. PATIENT AND FAMILY HAVE BEEN COUNSELLED AT LENGTH ABOUT THE RISKS OF NONCOMPLIANCE WITH MEDICAL CARE. PALLIATIVE CARE CONSULT IN PROGRESS    # ACUTE ON CHRONIC HYPOXIC RESPIRATORY FAILURE S/T PULMONARY EDEMA - S/T INCOMPLETE HD SESSIONS ; ANASARCA  # HYPERKALEMIA  # HX OF COPD + S/P RECENT MULTIFOCAL PNEUMONIA ; R/O ASPIRATION PNEUMONIA  # PULMONARY HYPERTENSION  # UNCONTROLLED HTN  # ESRD ON HD TTS - W/ HX OF NONCOMPLIANCE    - TELEMETRY  - HYDRALAZINE, METOPROLOL AND NORVASC ; PRN IV ANTIHYPERTENSIVE  - LOKELMA  - SUPPLEMENTAL O2  - PLANNED FOR HD  - NEPHROLOGY CONSULT  - PULMONOLOGY CONSULT  - ID CONSULT    - CONTINUES TO REFUSE OR PRE-MATURELY DISCONTINUE SESSIONS OF HD      - [] - OVERNIGHT RAPID RESPONSE S/T HYPOXIA - SELF-LIMITED - PATIENT IMPROVED S/P O2 SUPPLEMENT  - EMPIRICALLY STARTED ON CEFEPIME + FLAGYLL, F/U BCX       # RECURRENT INTRACTABLE NAUSEA/VOMITING, ? COFFEE GROUND EMESIS  # GASTROPARESIS  # HISTORY OF ESOPHAGITIS AND DUODENITIS [2021], HX OF GASTROPARESIS W/ EVIDENCE OF THICKENED ESOPHAGUS ON PREVIOUS CT SCAN   # PANCREAS W/ DOUBLE DUCT SIGN    - MONITORING HGB, PLACED ON PPI BID , CARAFATE QID  - PRN ANTIEMETICS  ; S/P DOSE OF REGLAN [WITH MINIMAL IMPROVEMENT]  - MONITORING FOR SYMPTOMS  - WHILE ON DIET PATIENT REPEATEDLY COUNSELLED TO CHEW FOOD CAUTIOUSLY AND CONSUME SMALL BITES W/ REGULAR CLEARING WITH WATER BETWEEN BITES    - ADVANCE DIET AS TOLERATED  - NOTED CT A/P    - S/P RECENT PRBC TRANSFUSION     - GI CONSULT  - SURGERY CONSULT    # LESS LIKELY CHOLECYSTITIS  - ON ABX  - NOTED CT A/P  - HIDA SCAN - NEGATIVE  - SURGERY CONSULT    # ELEVATED TROPONINS - ? DEMAND ISCHEMIA    - S/P TELEMETRY  - TREND TROPONINS  - ECHO - TRACE MR, MODERATELY INCREASED LV WALL THICKNESS, NORMAL LV SYSTOLIC FUNCTION, SEVERE PULMONARY HTN  - CARDIOLOGY CONSULT    # EPISODE OF HYPOGLYCEMIA - IMPROVED  # GASTROPARESIS  # UNDERLYING DM  - SSI + FS  - COUNSELLED TO COMPLY WITH HD TO REDUCE UREMIA    - REPEATEDLY COUNSELLED TO COMPLY WITH FINGERSTICKS      # DEPRESSION  - PLACED ON ZOLOFT  - PSYCHIATRY CONSULT    # PATIENT UNDERGOING OUTPATIENT WORKUP FOR CENTRAL VENOUS STENOSIS THROUGH NEPHROLOGY TEAM  - ? s/p STENT PLACEMENT    # ANEMIA OF CKD  # HX OF PANCYTOPENIA   - TREND HGB, TRANSFUSION THRESHOLD HGB < 7; PATIENT STILL INTERMITTENTLY REFUSING BLOODWORK, COUNSELLED TO COMPLY  - TYPE AND SCREEN  - ON EPO  - DENIES RECENT HEMATEMESIS, MELENA, HEMATOCHEZIA    - S/P RECENT PRBC TRANSFUSION  - S/P PRBC TRANSFUSION     - PPI BID, CARAFATE QID    # SEVERE PROTEIN CALORIE MALNUTRITION, FAILURE TO THRIVE   -  TEMPORAL WASTING, LOSS OF MUSCLE MASS FROM SHOULDER AND HIP GIRDLE  - NUTRITIONAL SUPPLEMENT    # HLD  # PARTIALLY BLIND  # S/P LEFT BKA  # HX OF PVD  # LS SPINAL STENOSIS  # GI AND DVT PPX

## 2023-08-07 NOTE — PROGRESS NOTE ADULT - ASSESSMENT
62 year old male from [facility], walks with RW, with PMH of ESRD on HD (TTS), BKA- L w/prosthetic leg, DM, Left eye blindness, CHF, CAD, HTN, HLD, osteoporosis, bronchitis, current smoker, and anemia who presents with complaint of missed dialysis and does not make any urine. Patient states he was having shortness of breath. Patient admitted to telemetry for Acute Hyperkalemia 2/2 missed dialysis found to have pulmonary edema and BNP [id number]. Cardiology and nephro following.     Additionally pt had coffee ground emesis and abdominal pain, repeat CT A/P was negative for small bowel obstruction. But noted for cholelithiasis and trace pericholecystic fluid. Surgery following, concern for possible acute cholecystitis, HIDA scan was negative for acute cholecystitis. Additionally GI was consulted for possible endoscopy due to recent anemia and coffee ground emesis, no additional intervention at this time.     Hospital course complicated by Acute Hypoxic Respiratory Failure 2/2 worsening CHF. Patient was a rapid response due to hypoxia of 70-80% on room air, now requiring 4L oxygen via nasal cannula. Repeat CXR showing worsening pulmonary edema. Pt also found to have small bilateral pleural effusions, bilateral atelectasis and possible aspiration pneumonia, patient was started on cefepime and flagyl, ID Dr. Newby was consulted.    Patient refused dialysis today 8/5, continues to have intermittent nausea/vomiting.  Brother, Georgi Persaud aware of care plan,  pt noncompliance with treatment recommendations, refusing blood work and HD plan of care.   HIDA negative for cholecystitis, cardiology consult noted, CHF likely 2/2 fluid overload after missing dialysis op.  Palliative on board for ongoing GOC conversations, pt wishes for full code

## 2023-08-07 NOTE — PROGRESS NOTE ADULT - SUBJECTIVE AND OBJECTIVE BOX
Time of Visit:  Patient seen and examined.     MEDICATIONS  (STANDING):  albuterol/ipratropium for Nebulization 3 milliLiter(s) Nebulizer every 6 hours  amLODIPine   Tablet 10 milliGRAM(s) Oral daily  aspirin enteric coated 81 milliGRAM(s) Oral daily  atorvastatin 40 milliGRAM(s) Oral at bedtime  budesonide 160 MICROgram(s)/formoterol 4.5 MICROgram(s) Inhaler 2 Puff(s) Inhalation two times a day  chlorhexidine 2% Cloths 1 Application(s) Topical daily  dextrose 5%. 1000 milliLiter(s) (50 mL/Hr) IV Continuous <Continuous>  dextrose 5%. 1000 milliLiter(s) (100 mL/Hr) IV Continuous <Continuous>  dextrose 50% Injectable 25 Gram(s) IV Push once  dextrose 50% Injectable 12.5 Gram(s) IV Push once  dextrose 50% Injectable 25 Gram(s) IV Push once  epoetin beverley (PROCRIT) Injectable 28322 Unit(s) IV Push <User Schedule>  folic acid 1 milliGRAM(s) Oral daily  furosemide   Injectable 40 milliGRAM(s) IV Push daily  glucagon  Injectable 1 milliGRAM(s) IntraMuscular once  hydrALAZINE Injectable 5 milliGRAM(s) IV Push three times a day  insulin glargine Injectable (LANTUS) 4 Unit(s) SubCutaneous at bedtime  latanoprost 0.005% Ophthalmic Solution 1 Drop(s) Both EYES at bedtime  levothyroxine 25 MICROGram(s) Oral daily  metoprolol tartrate Injectable 5 milliGRAM(s) IV Push every 12 hours  pantoprazole  Injectable 40 milliGRAM(s) IV Push every 12 hours  sertraline 200 milliGRAM(s) Oral daily  sevelamer carbonate 800 milliGRAM(s) Oral three times a day with meals  sucralfate 1 Gram(s) Oral four times a day      MEDICATIONS  (PRN):  dextrose Oral Gel 15 Gram(s) Oral once PRN Blood Glucose LESS THAN 70 milliGRAM(s)/deciliter  haloperidol    Injectable 0.5 milliGRAM(s) IntraMuscular every 8 hours PRN Agitation  LORazepam   Injectable 0.5 milliGRAM(s) IV Push every 4 hours PRN agitation and nausea  ondansetron   Disintegrating Tablet 4 milliGRAM(s) Oral two times a day PRN Nausea and/or Vomiting  ondansetron Injectable 4 milliGRAM(s) IV Push every 6 hours PRN Nausea and/or Vomiting       Medications up to date at time of exam.      PHYSICAL EXAMINATION:    Vital Signs Last 24 Hrs  T(C): 36.2 (07 Aug 2023 05:06), Max: 37.2 (06 Aug 2023 17:48)  T(F): 97.2 (07 Aug 2023 05:06), Max: 99 (06 Aug 2023 22:20)  HR: 79 (07 Aug 2023 06:25) (79 - 92)  BP: 139/60 (07 Aug 2023 06:25) (139/60 - 170/91)  BP(mean): 85 (06 Aug 2023 22:20) (85 - 85)  RR: 18 (07 Aug 2023 06:25) (18 - 19)  SpO2: 92% (07 Aug 2023 06:25) (92% - 99%)    Parameters below as of 07 Aug 2023 06:25  Patient On (Oxygen Delivery Method): nasal cannula  O2 Flow (L/min): 2     (if applicable)    General : Alert and oriented. Fair historian. No acute distress.     HEENT: Head is normocephalic and atraumatic. No nasal tenderness. Extraocular muscles are intact. Mucous membranes are moist.     NECK: Supple, no palpable adenopathy.    LUNGS: Clear to auscultation bilaterally with no wheezing, rales, or rhonchi. No use of accessory muscle.     HEART: S1 S2 Regular rate and no click/ rub.     ABDOMEN: Soft, nontender, and nondistended. Active bowel sounds.     EXTREMITIES: Without any cyanosis, clubbing, rash, lesions .    NEUROLOGIC: Awake, alert, oriented.     SKIN: Warm and moist . Non diaphoretic.       LABS:    MICROBIOLOGY: (if applicable)    RADIOLOGY & ADDITIONAL STUDIES:  EKG:   CXR:  ECHO:    IMPRESSION: 62y Male 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      Osteoarthritis      Vision loss of left eye  blind      ESRD on hemodialysis  T/Th/S      Cataract  both eyes - hx of sx done      BPH (benign prostatic hyperplasia)      UTI (urinary tract infection)  hx of      Bladder mass  hx of      H/O hematuria      Osteoporosis      Vision loss of right eye  decreased      Depression      Chronic GERD      Osteomyelitis of vertebra      CHF (congestive heart failure)      Below knee amputation status, left  2012- pt is wearing prostesis      History of right cataract extraction      History of left cataract extraction      S/P arteriovenous (AV) fistula creation  right arm brachiocephalic arteriovenous fistula on 11/08/2018      H/O hematuria  s/p bladder bx and fulguration 2/25/2020      H/O transurethral destruction of bladder lesion  2020      History of excision of mass  back mass on 03/31/2021       Impression: This is a 61 Y/O Male from [facility] with ESRD on HD ( T-Th-Sat ). Current smoker . Presented to ED due to Missed Dialysis, last HD 07-22-23 . Per Patient stated that he often missed HD sessions due to Mild left leg pain and right leg swelling . S/p RRT for SOB with Hypoxia due to Acute Hypoxic Respiratory Failure secondary to Pulmonary edema/ Fluid overload due to Missed Dialysis . CT Abdomen with Small Bilateral Pleural Effusions, Rt Lower Lung with groundglass opacity. No bowel obstruction. Small Gall Stone.   Vomiting due to diabetic gastroparesis vs acute cholecystitis  unlikely SBO .       Suggestions:  O2 saturation 96% with O2 supplement. Continue Oxygen supplementation 2L NC.   Continue Duoneb via nebulization Q 6 Hours .   On lasix 40 mg IVP Daily .  Reinforced the importance of compliance to Dialysis schedule.   Aspiration precaution with HOB elevation especially during meal times.

## 2023-08-07 NOTE — PROGRESS NOTE ADULT - SUBJECTIVE AND OBJECTIVE BOX
DATE OF SERVICE: 08-07-23    Patient denies chest pain or shortness of breath.   Review of symptoms otherwise negative.    albuterol/ipratropium for Nebulization 3 milliLiter(s) Nebulizer every 6 hours  amLODIPine   Tablet 10 milliGRAM(s) Oral daily  aspirin enteric coated 81 milliGRAM(s) Oral daily  atorvastatin 40 milliGRAM(s) Oral at bedtime  budesonide 160 MICROgram(s)/formoterol 4.5 MICROgram(s) Inhaler 2 Puff(s) Inhalation two times a day  chlorhexidine 2% Cloths 1 Application(s) Topical daily  dextrose 5%. 1000 milliLiter(s) IV Continuous <Continuous>  dextrose 5%. 1000 milliLiter(s) IV Continuous <Continuous>  dextrose 50% Injectable 25 Gram(s) IV Push once  dextrose 50% Injectable 25 Gram(s) IV Push once  dextrose 50% Injectable 12.5 Gram(s) IV Push once  dextrose Oral Gel 15 Gram(s) Oral once PRN  epoetin beverley (PROCRIT) Injectable 75552 Unit(s) IV Push <User Schedule>  folic acid 1 milliGRAM(s) Oral daily  furosemide   Injectable 40 milliGRAM(s) IV Push daily  glucagon  Injectable 1 milliGRAM(s) IntraMuscular once  haloperidol    Injectable 0.5 milliGRAM(s) IntraMuscular every 8 hours PRN  hydrALAZINE Injectable 5 milliGRAM(s) IV Push three times a day  insulin glargine Injectable (LANTUS) 4 Unit(s) SubCutaneous at bedtime  latanoprost 0.005% Ophthalmic Solution 1 Drop(s) Both EYES at bedtime  levothyroxine 25 MICROGram(s) Oral daily  LORazepam   Injectable 0.5 milliGRAM(s) IV Push every 4 hours PRN  metoprolol tartrate Injectable 5 milliGRAM(s) IV Push every 12 hours  ondansetron   Disintegrating Tablet 4 milliGRAM(s) Oral two times a day PRN  ondansetron Injectable 4 milliGRAM(s) IV Push every 6 hours PRN  pantoprazole  Injectable 40 milliGRAM(s) IV Push every 12 hours  sertraline 200 milliGRAM(s) Oral daily  sevelamer carbonate 800 milliGRAM(s) Oral three times a day with meals  sucralfate 1 Gram(s) Oral four times a day          Hemoglobin: 7.2 g/dL (08-05 @ 12:21)  Hemoglobin: 7.7 g/dL (08-04 @ 07:58)            Creatinine Trend: 16.90<--, 15.40<--, 16.10<--, 13.80<--, 11.30<--, 16.60<--    COAGS:           T(C): 36.2 (08-07-23 @ 05:06), Max: 37.2 (08-06-23 @ 17:48)  HR: 79 (08-07-23 @ 06:25) (79 - 92)  BP: 139/60 (08-07-23 @ 06:25) (139/60 - 170/91)  RR: 18 (08-07-23 @ 06:25) (18 - 19)  SpO2: 92% (08-07-23 @ 06:25) (92% - 99%)  Wt(kg): --    I&O's Summary        HEENT:  (-)icterus (-)pallor  CV: N S1 S2 1/6 DIANA (+)2 Pulses B/l  Resp:  Clear to ausculatation B/L, normal effort  GI: (+) BS Soft, NT, ND  Lymph:  (-)Edema, (-)obvious lymphadenopathy  Skin: Warm to touch, Normal turgor  Psych: Appropriate mood and affect         ASSESSMENT/PLAN: 	62y Male  [place], walks with RW, with PMH of ESRD on HD (TTS), BKA- L w/prosthetic leg, DM, Left eye blindness, CHF,, HTN, HLD, EF 45-50%, normal perfusion 4/23 osteoporosis, bronchitis, current smoker, and anemia who presents with complaint of missed dialysis.     # CHF  - Due to missed HD  - HD per renal    # Positive trop  - nothing to suggest ACS.  His trop has been higher in the past and demonstrated normal perfusion on Stress 4/23  - ECHO noted, normal LV fx severe pulm HTN, cont to keep net negative     # Noncompliance  - Behavioral health f/u  - refusing meds and blood tests     # Smoking  - Cessation stressed    # HTN  - Suspect BP will improve once euvolemic     # N/V  - Surgery f/u    Dominic Still MD, [facility]  BEEPER [phone]

## 2023-08-07 NOTE — PROGRESS NOTE ADULT - PROBLEM SELECTOR PLAN 5
In the setting of ESRD vs possible gastric ulcer  s/p 1 unit prbc  hgb  7.2  Continue Epogen with dialysis  Continue Iron tabs  Continue protonix 40 mg bid  Continue sucralfate 1g qid  transfuse if hgb <7  maintain type and screen every 3 days  GI following, no endoscopy at this time

## 2023-08-07 NOTE — PROGRESS NOTE ADULT - SUBJECTIVE AND OBJECTIVE BOX
NP Note discussed with  primary attending    Patient is a 62y old  Male who presents with a chief complaint of Missed dialysis (07 Aug 2023 12:47)      INTERVAL HPI/OVERNIGHT EVENTS: no new complaints    MEDICATIONS  (STANDING):  albuterol/ipratropium for Nebulization 3 milliLiter(s) Nebulizer every 6 hours  amLODIPine   Tablet 10 milliGRAM(s) Oral daily  aspirin enteric coated 81 milliGRAM(s) Oral daily  atorvastatin 40 milliGRAM(s) Oral at bedtime  budesonide 160 MICROgram(s)/formoterol 4.5 MICROgram(s) Inhaler 2 Puff(s) Inhalation two times a day  chlorhexidine 2% Cloths 1 Application(s) Topical daily  dextrose 5%. 1000 milliLiter(s) (50 mL/Hr) IV Continuous <Continuous>  dextrose 5%. 1000 milliLiter(s) (100 mL/Hr) IV Continuous <Continuous>  dextrose 50% Injectable 25 Gram(s) IV Push once  dextrose 50% Injectable 12.5 Gram(s) IV Push once  dextrose 50% Injectable 25 Gram(s) IV Push once  epoetin beverley (PROCRIT) Injectable 70080 Unit(s) IV Push <User Schedule>  folic acid 1 milliGRAM(s) Oral daily  furosemide   Injectable 40 milliGRAM(s) IV Push daily  glucagon  Injectable 1 milliGRAM(s) IntraMuscular once  hydrALAZINE Injectable 5 milliGRAM(s) IV Push three times a day  insulin glargine Injectable (LANTUS) 4 Unit(s) SubCutaneous at bedtime  latanoprost 0.005% Ophthalmic Solution 1 Drop(s) Both EYES at bedtime  levothyroxine 25 MICROGram(s) Oral daily  metoprolol tartrate Injectable 5 milliGRAM(s) IV Push every 12 hours  pantoprazole  Injectable 40 milliGRAM(s) IV Push every 12 hours  sertraline 200 milliGRAM(s) Oral daily  sevelamer carbonate 800 milliGRAM(s) Oral three times a day with meals  sucralfate 1 Gram(s) Oral four times a day    MEDICATIONS  (PRN):  dextrose Oral Gel 15 Gram(s) Oral once PRN Blood Glucose LESS THAN 70 milliGRAM(s)/deciliter  haloperidol    Injectable 0.5 milliGRAM(s) IntraMuscular every 8 hours PRN Agitation  LORazepam   Injectable 0.5 milliGRAM(s) IV Push every 4 hours PRN agitation and nausea  ondansetron   Disintegrating Tablet 4 milliGRAM(s) Oral two times a day PRN Nausea and/or Vomiting  ondansetron Injectable 4 milliGRAM(s) IV Push every 6 hours PRN Nausea and/or Vomiting      __________________________________________________  REVIEW OF SYSTEMS:    CONSTITUTIONAL: No fever,   RESPIRATORY: No cough; No shortness of breath  CARDIOVASCULAR: No chest pain, no palpitations  GASTROINTESTINAL: No pain. + vomitingx 1,  No diarrhea   NEUROLOGICAL: No headache or numbness, no tremors  MUSCULOSKELETAL: No joint pain,   GENITOURINARY: no dysuria, no frequency, no hesitancy        Vital Signs Last 24 Hrs  T(C): 36.9 (07 Aug 2023 16:10), Max: 37.2 (06 Aug 2023 22:20)  T(F): 98.4 (07 Aug 2023 16:10), Max: 99 (06 Aug 2023 22:20)  HR: 87 (07 Aug 2023 16:10) (79 - 92)  BP: 172/92 (07 Aug 2023 16:10) (139/60 - 172/92)  BP(mean): 85 (06 Aug 2023 22:20) (85 - 85)  RR: 16 (07 Aug 2023 16:10) (16 - 18)  SpO2: 92% (07 Aug 2023 16:10) (92% - 99%)    Parameters below as of 07 Aug 2023 16:10  Patient On (Oxygen Delivery Method): room air        ________________________________________________  PHYSICAL EXAM:  GENERAL: NAD  CHEST/LUNG: Clear to ausculitation bilaterally  HEART: S1 S2  regular; no murmurs, gallops or rubs  ABDOMEN: Soft, Nontender, Nondistended; Bowel sounds present  EXTREMITIES: Left BKA   SKIN: warm and dry; no rash  NERVOUS SYSTEM:  Awake and alert; Oriented  to place, person and time ; no new deficits    _________________________________________________  LABS:              CAPILLARY BLOOD GLUCOSE      POCT Blood Glucose.: 81 mg/dL (07 Aug 2023 11:40)  POCT Blood Glucose.: 72 mg/dL (07 Aug 2023 07:56)  POCT Blood Glucose.: 66 mg/dL (07 Aug 2023 07:55)  POCT Blood Glucose.: 156 mg/dL (06 Aug 2023 21:24)        RADIOLOGY & ADDITIONAL TESTS:    Imaging Personally Reviewed:  YES/NO    Consultant(s) Notes Reviewed:   YES/ No    Care Discussed with Consultants :     Plan of care was discussed with patient and /or primary care giver; all questions and concerns were addressed and care was aligned with patient's wishes.

## 2023-08-08 NOTE — PROGRESS NOTE ADULT - SUBJECTIVE AND OBJECTIVE BOX
DATE OF SERVICE: 08-08-23    Patient denies chest pain or shortness of breath.   Review of symptoms otherwise negative.    acetaminophen     Tablet .. 975 milliGRAM(s) Oral every 8 hours PRN  albuterol/ipratropium for Nebulization 3 milliLiter(s) Nebulizer every 6 hours  amLODIPine   Tablet 10 milliGRAM(s) Oral daily  aspirin enteric coated 81 milliGRAM(s) Oral daily  atorvastatin 40 milliGRAM(s) Oral at bedtime  budesonide 160 MICROgram(s)/formoterol 4.5 MICROgram(s) Inhaler 2 Puff(s) Inhalation two times a day  chlorhexidine 2% Cloths 1 Application(s) Topical daily  dextrose 5%. 1000 milliLiter(s) IV Continuous <Continuous>  dextrose 5%. 1000 milliLiter(s) IV Continuous <Continuous>  dextrose 50% Injectable 25 Gram(s) IV Push once  dextrose 50% Injectable 12.5 Gram(s) IV Push once  dextrose 50% Injectable 25 Gram(s) IV Push once  dextrose Oral Gel 15 Gram(s) Oral once PRN  epoetin beverley (PROCRIT) Injectable 36827 Unit(s) IV Push <User Schedule>  folic acid 1 milliGRAM(s) Oral daily  furosemide   Injectable 40 milliGRAM(s) IV Push daily  glucagon  Injectable 1 milliGRAM(s) IntraMuscular once  haloperidol    Injectable 0.5 milliGRAM(s) IntraMuscular every 8 hours PRN  hydrALAZINE Injectable 5 milliGRAM(s) IV Push three times a day  insulin glargine Injectable (LANTUS) 4 Unit(s) SubCutaneous at bedtime  latanoprost 0.005% Ophthalmic Solution 1 Drop(s) Both EYES at bedtime  levothyroxine 25 MICROGram(s) Oral daily  lidocaine   4% Patch 2 Patch Transdermal daily  LORazepam   Injectable 0.5 milliGRAM(s) IV Push every 4 hours PRN  metoprolol tartrate Injectable 5 milliGRAM(s) IV Push every 12 hours  ondansetron   Disintegrating Tablet 4 milliGRAM(s) Oral two times a day PRN  ondansetron Injectable 4 milliGRAM(s) IV Push every 6 hours PRN  pantoprazole  Injectable 40 milliGRAM(s) IV Push every 12 hours  sertraline 200 milliGRAM(s) Oral daily  sevelamer carbonate 800 milliGRAM(s) Oral three times a day with meals  sucralfate 1 Gram(s) Oral four times a day                            7.1    2.90  )-----------( 83       ( 08 Aug 2023 07:05 )             23.0       Hemoglobin: 7.1 g/dL (08-08 @ 07:05)  Hemoglobin: 7.2 g/dL (08-05 @ 12:21)  Hemoglobin: 7.7 g/dL (08-04 @ 07:58)      08-08    140  |  102  |  83<H>  ----------------------------<  99  4.1   |  21<L>  |  17.40<H>    Ca    7.5<L>      08 Aug 2023 07:05  Phos  7.2     08-08  Mg     2.8     08-08      Creatinine Trend: 17.40<--, 16.90<--, 15.40<--, 16.10<--, 13.80<--, 11.30<--    COAGS:           T(C): 37.1 (08-08-23 @ 05:04), Max: 37.1 (08-08-23 @ 05:04)  HR: 91 (08-08-23 @ 05:04) (73 - 91)  BP: 158/77 (08-08-23 @ 05:04) (147/80 - 172/92)  RR: 18 (08-08-23 @ 05:04) (16 - 18)  SpO2: 92% (08-08-23 @ 05:04) (92% - 97%)  Wt(kg): --    I&O's Summary      HEENT:  (-)icterus (-)pallor  CV: N S1 S2 1/6 DIANA (+)2 Pulses B/l  Resp:  Clear to ausculatation B/L, normal effort  GI: (+) BS Soft, NT, ND  Lymph:  (-)Edema, (-)obvious lymphadenopathy  Skin: Warm to touch, Normal turgor  Psych: Appropriate mood and affect         ASSESSMENT/PLAN: 	62y Male  Mateus Murray, walks with RW, with PMH of ESRD on HD (TTS), BKA- L w/prosthetic leg, DM, Left eye blindness, CHF,, HTN, HLD, EF 45-50%, normal perfusion 4/23 osteoporosis, bronchitis, current smoker, and anemia who presents with complaint of missed dialysis.     # CHF  - Due to missed HD  - HD per renal    # Positive trop  - nothing to suggest ACS.  His trop has been higher in the past and demonstrated normal perfusion on Stress 4/23  - ECHO noted, normal LV fx severe pulm HTN, cont to keep net negative     # Noncompliance  - Behavioral health f/u  - refusing meds and blood tests as well as HD at times     # Smoking  - Cessation stressed    # HTN  - Suspect BP will improve once euvolemic         Dominic Still MD, [facility]  BEEPER [phone]

## 2023-08-08 NOTE — PROGRESS NOTE ADULT - SUBJECTIVE AND OBJECTIVE BOX
Patient is a 62y old  Male who presents with a chief complaint of Missed dialysis (08 Aug 2023 08:20)    PATIENT IS SEEN AND EXAMINED IN MEDICAL FLOOR.  MARIIT [    ]    JEREMY [   ]      GT [   ]    ALLERGIES:  No Known Drug Allergies  fish (Rash)  liver (Anaphylaxis)      Daily     Daily     VITALS:    Vital Signs Last 24 Hrs  T(C): 37.1 (08 Aug 2023 05:04), Max: 37.1 (08 Aug 2023 05:04)  T(F): 98.8 (08 Aug 2023 05:04), Max: 98.8 (08 Aug 2023 05:04)  HR: 91 (08 Aug 2023 05:04) (73 - 91)  BP: 158/77 (08 Aug 2023 05:04) (147/80 - 172/92)  BP(mean): --  RR: 18 (08 Aug 2023 05:04) (16 - 18)  SpO2: 92% (08 Aug 2023 05:04) (92% - 97%)    Parameters below as of 08 Aug 2023 05:04  Patient On (Oxygen Delivery Method): room air        LABS:    CBC Full  -  ( 08 Aug 2023 07:05 )  WBC Count : 2.90 K/uL  RBC Count : 2.55 M/uL  Hemoglobin : 7.1 g/dL  Hematocrit : 23.0 %  Platelet Count - Automated : 83 K/uL  Mean Cell Volume : 90.2 fl  Mean Cell Hemoglobin : 27.8 pg  Mean Cell Hemoglobin Concentration : 30.9 gm/dL  Auto Neutrophil # : x  Auto Lymphocyte # : x  Auto Monocyte # : x  Auto Eosinophil # : x  Auto Basophil # : x  Auto Neutrophil % : x  Auto Lymphocyte % : x  Auto Monocyte % : x  Auto Eosinophil % : x  Auto Basophil % : x      08-08    140  |  102  |  83<H>  ----------------------------<  99  4.1   |  21<L>  |  17.40<H>    Ca    7.5<L>      08 Aug 2023 07:05  Phos  7.2     08-08  Mg     2.8     08-08      CAPILLARY BLOOD GLUCOSE      POCT Blood Glucose.: 135 mg/dL (08 Aug 2023 08:32)  POCT Blood Glucose.: 81 mg/dL (07 Aug 2023 11:40)          Creatinine Trend: 17.40<--, 16.90<--, 15.40<--, 16.10<--, 13.80<--, 11.30<--  I&O's Summary          .Blood Blood-Peripheral  05-28 @ 14:07   No Growth Final  --  --          MEDICATIONS:    MEDICATIONS  (STANDING):  albuterol/ipratropium for Nebulization 3 milliLiter(s) Nebulizer every 6 hours  amLODIPine   Tablet 10 milliGRAM(s) Oral daily  aspirin enteric coated 81 milliGRAM(s) Oral daily  atorvastatin 40 milliGRAM(s) Oral at bedtime  budesonide 160 MICROgram(s)/formoterol 4.5 MICROgram(s) Inhaler 2 Puff(s) Inhalation two times a day  chlorhexidine 2% Cloths 1 Application(s) Topical daily  dextrose 5%. 1000 milliLiter(s) (50 mL/Hr) IV Continuous <Continuous>  dextrose 5%. 1000 milliLiter(s) (100 mL/Hr) IV Continuous <Continuous>  dextrose 50% Injectable 25 Gram(s) IV Push once  dextrose 50% Injectable 12.5 Gram(s) IV Push once  dextrose 50% Injectable 25 Gram(s) IV Push once  epoetin beverley (PROCRIT) Injectable 83807 Unit(s) IV Push <User Schedule>  folic acid 1 milliGRAM(s) Oral daily  furosemide   Injectable 40 milliGRAM(s) IV Push daily  glucagon  Injectable 1 milliGRAM(s) IntraMuscular once  hydrALAZINE Injectable 5 milliGRAM(s) IV Push three times a day  insulin glargine Injectable (LANTUS) 4 Unit(s) SubCutaneous at bedtime  latanoprost 0.005% Ophthalmic Solution 1 Drop(s) Both EYES at bedtime  levothyroxine 25 MICROGram(s) Oral daily  lidocaine   4% Patch 2 Patch Transdermal daily  metoprolol tartrate Injectable 5 milliGRAM(s) IV Push every 12 hours  pantoprazole  Injectable 40 milliGRAM(s) IV Push every 12 hours  sertraline 200 milliGRAM(s) Oral daily  sevelamer carbonate 800 milliGRAM(s) Oral three times a day with meals  sucralfate 1 Gram(s) Oral four times a day      MEDICATIONS  (PRN):  acetaminophen     Tablet .. 975 milliGRAM(s) Oral every 8 hours PRN Moderate Pain (4 - 6)  dextrose Oral Gel 15 Gram(s) Oral once PRN Blood Glucose LESS THAN 70 milliGRAM(s)/deciliter  haloperidol    Injectable 0.5 milliGRAM(s) IntraMuscular every 8 hours PRN Agitation  LORazepam   Injectable 0.5 milliGRAM(s) IV Push every 4 hours PRN agitation and nausea  ondansetron   Disintegrating Tablet 4 milliGRAM(s) Oral two times a day PRN Nausea and/or Vomiting  ondansetron Injectable 4 milliGRAM(s) IV Push every 6 hours PRN Nausea and/or Vomiting      REVIEW OF SYSTEMS:                           ALL ROS DONE [ X   ]    CONSTITUTIONAL:  LETHARGIC [   ], FEVER [   ], UNRESPONSIVE [   ]  CVS:  CP  [   ], SOB, [   ], PALPITATIONS [   ], DIZZYNESS [   ]  RS: COUGH [   ], SPUTUM [   ]  GI: ABDOMINAL PAIN [   ], NAUSEA [   ], VOMITINGS [   ], DIARRHEA [   ], CONSTIPATION [   ]  :  DYSURIA [   ], NOCTURIA [   ], INCREASED FREQUENCY [   ], DRIBLING [   ],  SKELETAL: PAINFUL JOINTS [   ], SWOLLEN JOINTS [   ], NECK ACHE [   ], LOW BACK ACHE [   ],  SKIN : ULCERS [   ], RASH [   ], ITCHING [   ]  CNS: HEAD ACHE [   ], DOUBLE VISION [   ], BLURRED VISION [   ], AMS / CONFUSION [   ], SEIZURES [   ], WEAKNESS [   ],TINGLING / NUMBNESS [   ]      PHYSICAL EXAMINATION:  GENERAL APPEARANCE: NO DISTRESS,    ANASARCA ++  HEENT:  NO PALLOR, NO  JVD,  NO   NODES, NECK SUPPLE  CVS: S1 +, S2 +,   RS: AEEB,  OCCASIONAL  RALES +,   CRACKLES+ AT LUNG BASES  ABD: SOFT, NT, NO, BS +  EXT: LEFT BKA  SKIN: WARM,   SKELETAL:  ROM ACCEPTABLE  CNS:  AAO X  2-3      RADIOLOGY :    RADIOLOGY AND READINGS REVIEWED    ASSESSMENT :     Hypervolemia    No pertinent past medical history    Hypertension    Adrenal insufficiency    CKD (chronic kidney disease)    Anemia    Glaucoma    Coronary artery disease    HLD (hyperlipidemia)    Peripheral vascular disease    Spinal stenosis of lumbosacral region    Hyperparathyroidism    Diabetes mellitus    Diabetic neuropathy    Contracture of hand    Osteoarthritis    Osteoporosis    Vision loss of left eye    ESRD on hemodialysis    Cataract    BPH (benign prostatic hyperplasia)    UTI (urinary tract infection)    Bladder mass    H/O hematuria    Osteoporosis    Vision loss of right eye    Depression    Chronic GERD    Osteomyelitis of vertebra    CHF (congestive heart failure)    No significant past surgical history    Below knee amputation status, left    History of right cataract extraction    History of left cataract extraction    S/P arteriovenous (AV) fistula creation    H/O hematuria    H/O transurethral destruction of bladder lesion    History of excision of mass        PLAN:  HPI:  Patient is 62 year old male from [facility], walks with RW, with PMH of ESRD on HD (TTS), BKA- L w/prosthetic leg, DM, Left eye blindness, CHF, CAD, HTN, HLD, osteoporosis, bronchitis, current smoker, and anemia who presents with complaint of missed dialysis. Last HD 7/22. Pt states he often missed HD sessions.  patient states he missed this HD session due to mild pain in left leg, patient remains able to ambulate. Pt complains of right leg swelling and states he does not make any urine. Patient states he was having mild shortness of breath.  Pt denies any fever, chills, N/V/D, constipation, CP, numbness or tingling (26 Jul 2023 19:20)    # CASE DISCUSSED AT LENGTH WITH PATIENT'S BROTHER ARTEMIO @ [phone]. DISCUSSED REGARDING CURRENT CLINICAL CONDITION, RECOMMENDED EVALUATIONS AND INTERVENTIONS. ALL QUESTIONS ANSWERED. DISCUSSED THAT PROGNOSIS IS POOR/GRIM GIVEN UNDERLYING MEDICAL CONDITIONS. ALSO DISCUSSED THAT DESPITE VERBALIZING UNDERSTANDING OF MEDICAL CONDITIONS AND RECOMMENDED INTERVENTIONS - PATIENT PERSISTENTLY REMAINS NONCOMPLIANT. TEAM HAS OFFERED PATIENT EMOTIONAL SUPPORT AND OFFERED MEDICATION FOR MOOD. FAMILY HAS ALSO COUNSELLED PATIENT AT LENGTH AND UNDERSTAND THAT HIS PROGNOSIS IS POOR GIVEN WORSENING CLINICAL CONDITION AND HIS RECURRENT NONCOMPLIANCE. FAMILY ALSO CONVEYS THAT THEY ARE AGREEABLE TO PALLIATIVE CARE DISCUSSION WITH PATIENT AND FAMILY [8/3]   - PALLIATIVE CARE AND PSYCHIATRY RE-CONSULTED. FAMILY IS AGREEABLE.       # PATIENT W/ HISTORY OF ROUTINE NONCOMPLIANCE W/ LIFE-SUSTAINING TREATMENT [HD], EVALUATIONS AND INTERVENTIONS - COUNSELLED AT LENGTH TO REMAIN COMPLIANT. D/W PATIENT THAT PROGNOSIS IS GRIM/POOR. HE VERBALIZED UNDERSTANDING. REGARDING GOC - PATIENT WISHES FOR FULL CODE. PALLIATIVE CARE CONSULTED. PSYCHIATRY CONSULTED.  - PATIENT IS ORIENTED TO PERSON, PLACE AND CIRCUMSTANCE. HE EXPRESSED THAT HE DOES GROW IMPATIENT DURING DIALYSIS SESSIONS AND HAS BEEN LEAVING SESSIONS SOONER THAN PRESCRIBED. IN DISCUSSION THAT RISKS OF DEFERRING COMPLETE HD - INCLUDE BUT ARE NOT LIMITED TO WORSENING CLINICAL CONDITION, ELECTROLYTE ABNORMALITIES, ARRHYTHMIA AND DEATH. HE VERBALIZED UNDERSTANDING. HE REFUSES TO CONSIDER A NURSING HOME WITH ONSITE HD.   - D/W PATIENT THAT REPEATEDLY REFUSING INTERVENTIONS AND ASSESSMENTS IS NOT IN LINE WITH HIS WISHES TO IMPROVE. PATIENT VERBALIZED UNDERSTANDING AND AGREEMENT. IN DISCUSSION, REGARDING POSSIBLE ETIOLOGY - PSYCHIATRY WAS CONSULTED TO DISCUSS MOOD, PATIENT DOES EXPRESS THAT HE HAS SOME DEPRESSION GIVEN HIS MEDICAL CONDITIONS. BUT HE DENIES THAT THIS IS THE ETIOLOGY FOR NONCOMPLIANCE. HE EXPLAINS THAT HE DOES NOT LIKE BEING CONFINED TO A DIALYSIS MACHINE. OFFERED FOR PATIENT TO CONSIDER NURSING HOME W/ ONSITE HD. PATIENT WISHES TO CONTINUE TO LIVE IN ASSISTED LIVING.    - [8/7] - PATIENT CONTINUES TO BE NONCOMPLIANT WITH HD SESSIONS, REQUESTING TERMINATION OF SESSIONS SHORTLY AFTER INITIATION OR REFUSING TO PARTICIPATE IN HD WHEN ORDERED. PATIENT AND FAMILY HAVE BEEN COUNSELLED AT LENGTH ABOUT THE RISKS OF NONCOMPLIANCE WITH MEDICAL CARE. PALLIATIVE CARE CONSULT IN PROGRESS    # ACUTE ON CHRONIC HYPOXIC RESPIRATORY FAILURE S/T PULMONARY EDEMA - S/T INCOMPLETE HD SESSIONS ; ANASARCA  # HYPERKALEMIA  # HX OF COPD + S/P RECENT MULTIFOCAL PNEUMONIA ; R/O ASPIRATION PNEUMONIA  # PULMONARY HYPERTENSION  # UNCONTROLLED HTN  # ESRD ON HD TTS - W/ HX OF NONCOMPLIANCE    - TELEMETRY  - HYDRALAZINE, METOPROLOL AND NORVASC ; PRN IV ANTIHYPERTENSIVE  - LOKELMA  - SUPPLEMENTAL O2  - PLANNED FOR HD  - NEPHROLOGY CONSULT  - PULMONOLOGY CONSULT  - ID CONSULT    - CONTINUES TO REFUSE OR PRE-MATURELY DISCONTINUE SESSIONS OF HD      - [7/30] - OVERNIGHT RAPID RESPONSE S/T HYPOXIA - SELF-LIMITED - PATIENT IMPROVED S/P O2 SUPPLEMENT  - EMPIRICALLY STARTED ON CEFEPIME + FLAGYLL, F/U BCX       # RECURRENT INTRACTABLE NAUSEA/VOMITING, ? COFFEE GROUND EMESIS  # GASTROPARESIS  # HISTORY OF ESOPHAGITIS AND DUODENITIS [1/2021], HX OF GASTROPARESIS W/ EVIDENCE OF THICKENED ESOPHAGUS ON PREVIOUS CT SCAN   # PANCREAS W/ DOUBLE DUCT SIGN    - MONITORING HGB, PLACED ON PPI BID , CARAFATE QID  - PRN ANTIEMETICS  ; S/P DOSE OF REGLAN [WITH MINIMAL IMPROVEMENT]  - MONITORING FOR SYMPTOMS  - WHILE ON DIET PATIENT REPEATEDLY COUNSELLED TO CHEW FOOD CAUTIOUSLY AND CONSUME SMALL BITES W/ REGULAR CLEARING WITH WATER BETWEEN BITES    - ADVANCE DIET AS TOLERATED  - NOTED CT A/P    - S/P RECENT PRBC TRANSFUSION     - GI CONSULT  - SURGERY CONSULT    # LESS LIKELY CHOLECYSTITIS  - ON ABX  - NOTED CT A/P  - HIDA SCAN - NEGATIVE  - SURGERY CONSULT    # ELEVATED TROPONINS - ? DEMAND ISCHEMIA    - S/P TELEMETRY  - TREND TROPONINS  - ECHO - TRACE MR, MODERATELY INCREASED LV WALL THICKNESS, NORMAL LV SYSTOLIC FUNCTION, SEVERE PULMONARY HTN  - CARDIOLOGY CONSULT    # EPISODE OF HYPOGLYCEMIA - IMPROVED  # GASTROPARESIS  # UNDERLYING DM  - SSI + FS  - COUNSELLED TO COMPLY WITH HD TO REDUCE UREMIA    - REPEATEDLY COUNSELLED TO COMPLY WITH FINGERSTICKS      # DEPRESSION  - PLACED ON ZOLOFT  - PSYCHIATRY CONSULT    # PATIENT UNDERGOING OUTPATIENT WORKUP FOR CENTRAL VENOUS STENOSIS THROUGH NEPHROLOGY TEAM  - ? s/p STENT PLACEMENT    # ANEMIA OF CKD  # HX OF PANCYTOPENIA   - TREND HGB, TRANSFUSION THRESHOLD HGB < 7; PATIENT STILL INTERMITTENTLY REFUSING BLOODWORK, COUNSELLED TO COMPLY  - TYPE AND SCREEN  - ON EPO  - DENIES RECENT HEMATEMESIS, MELENA, HEMATOCHEZIA    - S/P RECENT PRBC TRANSFUSION  - S/P PRBC TRANSFUSION 7/29    - PPI BID, CARAFATE QID    # SEVERE PROTEIN CALORIE MALNUTRITION, FAILURE TO THRIVE   -  TEMPORAL WASTING, LOSS OF MUSCLE MASS FROM SHOULDER AND HIP GIRDLE  - NUTRITIONAL SUPPLEMENT    # HLD  # PARTIALLY BLIND  # S/P LEFT BKA  # HX OF PVD  # LS SPINAL STENOSIS  # GI AND DVT PPX   Patient is a 62y old  Male who presents with a chief complaint of Missed dialysis (08 Aug 2023 08:20)    PATIENT IS SEEN AND EXAMINED IN MEDICAL FLOOR.      ALLERGIES:  No Known Drug Allergies  fish (Rash)  liver (Anaphylaxis)      VITALS:    Vital Signs Last 24 Hrs  T(C): 37.1 (08 Aug 2023 05:04), Max: 37.1 (08 Aug 2023 05:04)  T(F): 98.8 (08 Aug 2023 05:04), Max: 98.8 (08 Aug 2023 05:04)  HR: 91 (08 Aug 2023 05:04) (73 - 91)  BP: 158/77 (08 Aug 2023 05:04) (147/80 - 172/92)  BP(mean): --  RR: 18 (08 Aug 2023 05:04) (16 - 18)  SpO2: 92% (08 Aug 2023 05:04) (92% - 97%)    Parameters below as of 08 Aug 2023 05:04  Patient On (Oxygen Delivery Method): room air        LABS:    CBC Full  -  ( 08 Aug 2023 07:05 )  WBC Count : 2.90 K/uL  RBC Count : 2.55 M/uL  Hemoglobin : 7.1 g/dL  Hematocrit : 23.0 %  Platelet Count - Automated : 83 K/uL  Mean Cell Volume : 90.2 fl  Mean Cell Hemoglobin : 27.8 pg  Mean Cell Hemoglobin Concentration : 30.9 gm/dL  Auto Neutrophil # : x  Auto Lymphocyte # : x  Auto Monocyte # : x  Auto Eosinophil # : x  Auto Basophil # : x  Auto Neutrophil % : x  Auto Lymphocyte % : x  Auto Monocyte % : x  Auto Eosinophil % : x  Auto Basophil % : x      08-08    140  |  102  |  83<H>  ----------------------------<  99  4.1   |  21<L>  |  17.40<H>    Ca    7.5<L>      08 Aug 2023 07:05  Phos  7.2     08-08  Mg     2.8     08-08      CAPILLARY BLOOD GLUCOSE      POCT Blood Glucose.: 135 mg/dL (08 Aug 2023 08:32)  POCT Blood Glucose.: 81 mg/dL (07 Aug 2023 11:40)          Creatinine Trend: 17.40<--, 16.90<--, 15.40<--, 16.10<--, 13.80<--, 11.30<--  I&O's Summary          .Blood Blood-Peripheral  05-28 @ 14:07   No Growth Final  --  --          MEDICATIONS:    MEDICATIONS  (STANDING):  albuterol/ipratropium for Nebulization 3 milliLiter(s) Nebulizer every 6 hours  amLODIPine   Tablet 10 milliGRAM(s) Oral daily  aspirin enteric coated 81 milliGRAM(s) Oral daily  atorvastatin 40 milliGRAM(s) Oral at bedtime  budesonide 160 MICROgram(s)/formoterol 4.5 MICROgram(s) Inhaler 2 Puff(s) Inhalation two times a day  chlorhexidine 2% Cloths 1 Application(s) Topical daily  dextrose 5%. 1000 milliLiter(s) (50 mL/Hr) IV Continuous <Continuous>  dextrose 5%. 1000 milliLiter(s) (100 mL/Hr) IV Continuous <Continuous>  dextrose 50% Injectable 25 Gram(s) IV Push once  dextrose 50% Injectable 12.5 Gram(s) IV Push once  dextrose 50% Injectable 25 Gram(s) IV Push once  epoetin beverley (PROCRIT) Injectable 24515 Unit(s) IV Push <User Schedule>  folic acid 1 milliGRAM(s) Oral daily  furosemide   Injectable 40 milliGRAM(s) IV Push daily  glucagon  Injectable 1 milliGRAM(s) IntraMuscular once  hydrALAZINE Injectable 5 milliGRAM(s) IV Push three times a day  insulin glargine Injectable (LANTUS) 4 Unit(s) SubCutaneous at bedtime  latanoprost 0.005% Ophthalmic Solution 1 Drop(s) Both EYES at bedtime  levothyroxine 25 MICROGram(s) Oral daily  lidocaine   4% Patch 2 Patch Transdermal daily  metoprolol tartrate Injectable 5 milliGRAM(s) IV Push every 12 hours  pantoprazole  Injectable 40 milliGRAM(s) IV Push every 12 hours  sertraline 200 milliGRAM(s) Oral daily  sevelamer carbonate 800 milliGRAM(s) Oral three times a day with meals  sucralfate 1 Gram(s) Oral four times a day      MEDICATIONS  (PRN):  acetaminophen     Tablet .. 975 milliGRAM(s) Oral every 8 hours PRN Moderate Pain (4 - 6)  dextrose Oral Gel 15 Gram(s) Oral once PRN Blood Glucose LESS THAN 70 milliGRAM(s)/deciliter  haloperidol    Injectable 0.5 milliGRAM(s) IntraMuscular every 8 hours PRN Agitation  LORazepam   Injectable 0.5 milliGRAM(s) IV Push every 4 hours PRN agitation and nausea  ondansetron   Disintegrating Tablet 4 milliGRAM(s) Oral two times a day PRN Nausea and/or Vomiting  ondansetron Injectable 4 milliGRAM(s) IV Push every 6 hours PRN Nausea and/or Vomiting      REVIEW OF SYSTEMS:                           ALL ROS DONE [ X   ]    CONSTITUTIONAL:  LETHARGIC [   ], FEVER [   ], UNRESPONSIVE [   ]  CVS:  CP  [   ], SOB, [   ], PALPITATIONS [   ], DIZZYNESS [   ]  RS: COUGH [   ], SPUTUM [   ]  GI: ABDOMINAL PAIN [   ], NAUSEA [   ], VOMITINGS [   ], DIARRHEA [   ], CONSTIPATION [   ]  :  DYSURIA [   ], NOCTURIA [   ], INCREASED FREQUENCY [   ], DRIBLING [   ],  SKELETAL: PAINFUL JOINTS [   ], SWOLLEN JOINTS [   ], NECK ACHE [   ], LOW BACK ACHE [   ],  SKIN : ULCERS [   ], RASH [   ], ITCHING [   ]  CNS: HEAD ACHE [   ], DOUBLE VISION [   ], BLURRED VISION [   ], AMS / CONFUSION [   ], SEIZURES [   ], WEAKNESS [   ],TINGLING / NUMBNESS [   ]      PHYSICAL EXAMINATION:  GENERAL APPEARANCE: NO DISTRESS,    ANASARCA ++  HEENT:  NO PALLOR, NO  JVD,  NO   NODES, NECK SUPPLE  CVS: S1 +, S2 +,   RS: AEEB,  OCCASIONAL  RALES +,   CRACKLES+ AT LUNG BASES  ABD: SOFT, NT, NO, BS +  EXT: LEFT BKA  SKIN: WARM,   SKELETAL:  ROM ACCEPTABLE  CNS:  AAO X  2-3      RADIOLOGY :    RADIOLOGY AND READINGS REVIEWED    ASSESSMENT :     Hypervolemia    No pertinent past medical history    Hypertension    Adrenal insufficiency    CKD (chronic kidney disease)    Anemia    Glaucoma    Coronary artery disease    HLD (hyperlipidemia)    Peripheral vascular disease    Spinal stenosis of lumbosacral region    Hyperparathyroidism    Diabetes mellitus    Diabetic neuropathy    Contracture of hand    Osteoarthritis    Osteoporosis    Vision loss of left eye    ESRD on hemodialysis    Cataract    BPH (benign prostatic hyperplasia)    UTI (urinary tract infection)    Bladder mass    H/O hematuria    Osteoporosis    Vision loss of right eye    Depression    Chronic GERD    Osteomyelitis of vertebra    CHF (congestive heart failure)    No significant past surgical history    Below knee amputation status, left    History of right cataract extraction    History of left cataract extraction    S/P arteriovenous (AV) fistula creation    H/O hematuria    H/O transurethral destruction of bladder lesion    History of excision of mass        PLAN:  HPI:  Patient is 62 year old male from [facility], walks with RW, with PMH of ESRD on HD (TTS), BKA- L w/prosthetic leg, DM, Left eye blindness, CHF, CAD, HTN, HLD, osteoporosis, bronchitis, current smoker, and anemia who presents with complaint of missed dialysis. Last HD 7/22. Pt states he often missed HD sessions.  patient states he missed this HD session due to mild pain in left leg, patient remains able to ambulate. Pt complains of right leg swelling and states he does not make any urine. Patient states he was having mild shortness of breath.  Pt denies any fever, chills, N/V/D, constipation, CP, numbness or tingling (26 Jul 2023 19:20)    # CASE DISCUSSED AT LENGTH WITH PATIENT'S BROTHER ARTEMIO @ [phone]. DISCUSSED REGARDING CURRENT CLINICAL CONDITION, RECOMMENDED EVALUATIONS AND INTERVENTIONS. ALL QUESTIONS ANSWERED. DISCUSSED THAT PROGNOSIS IS POOR/GRIM GIVEN UNDERLYING MEDICAL CONDITIONS. ALSO DISCUSSED THAT DESPITE VERBALIZING UNDERSTANDING OF MEDICAL CONDITIONS AND RECOMMENDED INTERVENTIONS - PATIENT PERSISTENTLY REMAINS NONCOMPLIANT. TEAM HAS OFFERED PATIENT EMOTIONAL SUPPORT AND OFFERED MEDICATION FOR MOOD. FAMILY HAS ALSO COUNSELLED PATIENT AT LENGTH AND UNDERSTAND THAT HIS PROGNOSIS IS POOR GIVEN WORSENING CLINICAL CONDITION AND HIS RECURRENT NONCOMPLIANCE. FAMILY ALSO CONVEYS THAT THEY ARE AGREEABLE TO PALLIATIVE CARE DISCUSSION WITH PATIENT AND FAMILY [8/8]   - PALLIATIVE CARE AND PSYCHIATRY RE-CONSULTED. FAMILY IS AGREEABLE.   - BROTHER WISHES FOR PATIENT TO TRANSITION TO NURSING FACILITY WITH ONSITE HD. GIVEN PATIENT'S INCREASED CARE NEEDS - PATIENT WOULD BENEFIT FROM LTP W/ ONSITE HD.       # PATIENT W/ HISTORY OF ROUTINE NONCOMPLIANCE W/ LIFE-SUSTAINING TREATMENT [HD], EVALUATIONS AND INTERVENTIONS - COUNSELLED AT LENGTH TO REMAIN COMPLIANT. D/W PATIENT THAT PROGNOSIS IS GRIM/POOR. HE VERBALIZED UNDERSTANDING. REGARDING GOC - PATIENT WISHES FOR FULL CODE. PALLIATIVE CARE CONSULTED. PSYCHIATRY CONSULTED.  - PATIENT IS ORIENTED TO PERSON, PLACE AND CIRCUMSTANCE. HE EXPRESSED THAT HE DOES GROW IMPATIENT DURING DIALYSIS SESSIONS AND HAS BEEN LEAVING SESSIONS SOONER THAN PRESCRIBED. IN DISCUSSION THAT RISKS OF DEFERRING COMPLETE HD - INCLUDE BUT ARE NOT LIMITED TO WORSENING CLINICAL CONDITION, ELECTROLYTE ABNORMALITIES, ARRHYTHMIA AND DEATH. HE VERBALIZED UNDERSTANDING. HE REFUSES TO CONSIDER A NURSING HOME WITH ONSITE HD.   - D/W PATIENT THAT REPEATEDLY REFUSING INTERVENTIONS AND ASSESSMENTS IS NOT IN LINE WITH HIS WISHES TO IMPROVE. PATIENT VERBALIZED UNDERSTANDING AND AGREEMENT. IN DISCUSSION, REGARDING POSSIBLE ETIOLOGY - PSYCHIATRY WAS CONSULTED TO DISCUSS MOOD, PATIENT DOES EXPRESS THAT HE HAS SOME DEPRESSION GIVEN HIS MEDICAL CONDITIONS. BUT HE DENIES THAT THIS IS THE ETIOLOGY FOR NONCOMPLIANCE. HE EXPLAINS THAT HE DOES NOT LIKE BEING CONFINED TO A DIALYSIS MACHINE. OFFERED FOR PATIENT TO CONSIDER NURSING HOME W/ ONSITE HD. PATIENT WISHES TO CONTINUE TO LIVE IN ASSISTED LIVING.    - [8/8] - PATIENT CONTINUES TO BE NONCOMPLIANT WITH HD SESSIONS, REQUESTING TERMINATION OF SESSIONS SHORTLY AFTER INITIATION OR REFUSING TO PARTICIPATE IN HD WHEN ORDERED. PATIENT AND FAMILY HAVE BEEN COUNSELLED AT LENGTH ABOUT THE RISKS OF NONCOMPLIANCE WITH MEDICAL CARE. PALLIATIVE CARE CONSULT IN PROGRESS    # ACUTE ON CHRONIC HYPOXIC RESPIRATORY FAILURE S/T PULMONARY EDEMA - S/T INCOMPLETE HD SESSIONS ; ANASARCA  # HYPERKALEMIA  # HX OF COPD + S/P RECENT MULTIFOCAL PNEUMONIA ; R/O ASPIRATION PNEUMONIA  # PULMONARY HYPERTENSION  # UNCONTROLLED HTN  # ESRD ON HD TTS - W/ HX OF NONCOMPLIANCE    - TELEMETRY  - HYDRALAZINE, METOPROLOL AND NORVASC ; PRN IV ANTIHYPERTENSIVE  - LOKELMA  - SUPPLEMENTAL O2  - PLANNED FOR HD  - NEPHROLOGY CONSULT  - PULMONOLOGY CONSULT  - ID CONSULT    - CONTINUES TO REFUSE OR PRE-MATURELY DISCONTINUE SESSIONS OF HD      - [7/30] - OVERNIGHT RAPID RESPONSE S/T HYPOXIA - SELF-LIMITED - PATIENT IMPROVED S/P O2 SUPPLEMENT  - S/P CEFEPIME + FLAGYLL, BCX - NGTD      # RECURRENT INTRACTABLE NAUSEA/VOMITING, ? COFFEE GROUND EMESIS  # GASTROPARESIS  # HISTORY OF ESOPHAGITIS AND DUODENITIS [1/2021], HX OF GASTROPARESIS W/ EVIDENCE OF THICKENED ESOPHAGUS ON PREVIOUS CT SCAN   # PANCREAS W/ DOUBLE DUCT SIGN    - MONITORING HGB, PLACED ON PPI BID , CARAFATE QID  - PRN ANTIEMETICS  ; S/P DOSE OF REGLAN [WITH MINIMAL IMPROVEMENT]  - MONITORING FOR SYMPTOMS  - WHILE ON DIET PATIENT REPEATEDLY COUNSELLED TO CHEW FOOD CAUTIOUSLY AND CONSUME SMALL BITES W/ REGULAR CLEARING WITH WATER BETWEEN BITES    - ADVANCE DIET AS TOLERATED  - NOTED CT A/P    - S/P RECENT PRBC TRANSFUSION     - GI CONSULT  - SURGERY CONSULT    # LESS LIKELY CHOLECYSTITIS  - S/P ABX  - NOTED CT A/P  - HIDA SCAN - NEGATIVE  - SURGERY CONSULT    # ELEVATED TROPONINS - ? DEMAND ISCHEMIA    - S/P TELEMETRY  - TREND TROPONINS  - ECHO - TRACE MR, MODERATELY INCREASED LV WALL THICKNESS, NORMAL LV SYSTOLIC FUNCTION, SEVERE PULMONARY HTN  - CARDIOLOGY CONSULT    # EPISODE OF HYPOGLYCEMIA - IMPROVED  # GASTROPARESIS  # UNDERLYING DM  - SSI + FS  - COUNSELLED TO COMPLY WITH HD TO REDUCE UREMIA    - REPEATEDLY COUNSELLED TO COMPLY WITH FINGERSTICKS      # DEPRESSION  - PLACED ON ZOLOFT  - PSYCHIATRY CONSULT    # PATIENT UNDERGOING OUTPATIENT WORKUP FOR CENTRAL VENOUS STENOSIS THROUGH NEPHROLOGY TEAM  - ? s/p STENT PLACEMENT    # ANEMIA OF CKD  # HX OF PANCYTOPENIA   - TREND HGB, TRANSFUSION THRESHOLD HGB < 7; PATIENT STILL INTERMITTENTLY REFUSING BLOODWORK, COUNSELLED TO COMPLY  - TYPE AND SCREEN  - ON EPO  - DENIES RECENT HEMATEMESIS, MELENA, HEMATOCHEZIA    - S/P RECENT PRBC TRANSFUSION  - S/P PRBC TRANSFUSION 7/29    - PPI BID, CARAFATE QID    # SEVERE PROTEIN CALORIE MALNUTRITION, FAILURE TO THRIVE   -  TEMPORAL WASTING, LOSS OF MUSCLE MASS FROM SHOULDER AND HIP GIRDLE  - NUTRITIONAL SUPPLEMENT    # HLD  # PARTIALLY BLIND  # S/P LEFT BKA  # HX OF PVD  # LS SPINAL STENOSIS  # GI AND DVT PPX

## 2023-08-08 NOTE — CONSULT NOTE ADULT - CONSULT REQUESTED BY NAME
Dr De La Torre
Dr De La Torre
Dr. De La Torre
Med
Danny NP
Dr De La Torre
Dr. De La Torre
Dr. De La Torre

## 2023-08-08 NOTE — CONSULT NOTE ADULT - ASSESSMENT
# ESRD. he is grossly fluid overloaded. He only agreed to 1 hr of HD yesterday. Encouraged to stay for the full treatment time today.  noted, psychiatry service [id number]  # anemia of CKD. epogen on HD. transfuse for HBG <7  # RENAL OSTEODYSTROPHY. PHOS 5.3 ADD SEVELAMER  # HTN. cont current medications . UF at HD     thanks for the consult 
Patient is a 62M with a PMHx of ESRD (on HD, TTS), L BKA with prosthetic leg, DM, L eye blindness, CHF, CAD, HTN, HLD, osteoporosis, bronchitis current smoker, and anemia, who presents to the ED after missed dialysis session. GI was consulted for coffee-ground emesis.    Patient presented on 7/26 after missing his dialysis sessions, last HD 7/22.   At time of exam, patient endorsed nausea for "a couple of days", trialed Tylenol with minimal effect, last emesis today morning, bilious, decreased appetite, and weight loss of unknown amount. He denies cp, sob, dizziness, diarrhea, hematochezia and melena. Currently smokes 1 cigarette a day, denies ETOH and drug use. No family hx of liver disease or colorectal cancers. Does not take herbal supplements.   Patient is known to the GI service -   Recent admission 7/1-7/3 for symptomatic anemia, underwent dialysis on the day of discharge, Hgb 6.8 s/p transfusion during dialysis.   EGD (01/2021): LA grade D esophagitis, bx unremarkable.  EGD (8/3/2022) for DELROY and dysphagia: lg amt of food in gastric body, aborted.  [place] (8/3/2022): poor bowel prep, aborted.   Inpatient GI eval 2/22/23 for double duct sign on CTAP: MRCP noncon done, outpatient GI follow up, AFP 8 wnl, CEA 9.4, CA 19.9 <2.  Inpatient GI 3/21/23 for hematemesis: PPI BID, carafate TID x 1 weeks, EUS vs surveillance MRI outpatient  Inpatient GI eval 7/10 for anemia: outpatient EGD/colo +/- EUS.     Hospital course - Nephrology was consulted for ESRD and inpatient HD. Cardiology was consulted for positive trop.  was consulted for delirium vs depression. Surgery was consulted for n/v, concerned for acute cholecystitis - CTAP showed dilated CBD, small gallstones & nonspecific trace pericholecystic fluid, dilated main PD, rec HIDA scan - aborted early due to patient declining full exam however no evidence of acute nancy or biliary obstruction.  Rapid response on 7/30 for hypoxic event, 70-80% on RA, placed on NRB, likely pulmonary edema. Currently requiring supplemental O2.     #Nausea  #Coffee-ground emesis (resolved)  #Anemia  #DELROY  #Double duct sign  #Hx of esophagitis  Patient's Hgb on admission 7.2 -> 7.0 -> 6.5, s/p 1u PRBC -> 8.0. Hgb 7.8 this morning, remains stable, no overt signs of bleeding. Reports of coffee-ground emesis however at bedside, patient's emesis bag at bedside had a small amount of bilious vomit, no bright red blood tinge or dark color noted, less likely coffee-ground emesis vs self-resolved. Nausea likely 2/2 poor HD compliance, ? uremic.  Given increased O2 requirement and otherwise no overt signs of bleeding, recommend conservative management at this time.     	- IV PPI BID  	- PRN IV anti-emetics  	- PO carafate suspension 1g three times daily  	- Maintain active T&S, 2 large bore peripheral IVs, transfuse for goal Hgb > 7 or if symptomatic  	- Trend H/H  	- Outpatient GI follow up    This note and its recommendations herein are preliminary until such time as cosigned by an attending.    Thank you for this consult!
Search Terms: Rush Lassiter, 1960Search Date: 08/08/2023 08:20:32 AM  The Drug Utilization Report below displays all of the controlled substance prescriptions, if any, that your patient has filled in the last twelve months. The information displayed on this report is compiled from pharmacy submissions to the Department, and accurately reflects the information as submitted by the pharmacies.    This report was requested by: Nimisha Gatica | Reference #: [id number]    There are no results for the search terms that you entered.
? aspiration Pneumonia - clinically he has no evidence of Pneumonia and appears to have CHF findings    Plan - Cont Maxipime 1 gm iv q24hrs and   Flagyl 500mgs iv q8hrs for now  if planning to DC back to his facility then will plan to switch to augmentin 500mgs po BID to complete a total of ALL ANTIBIOTICS that would including maxipime and flagyl.

## 2023-08-08 NOTE — PROGRESS NOTE ADULT - ASSESSMENT
62 year old male from [facility], walks with RW, with PMH of ESRD on HD (TTS), BKA- L w/prosthetic leg, DM, Left eye blindness, CHF, CAD, HTN, HLD, osteoporosis, bronchitis, current smoker, and anemia who presents with complaint of missed dialysis and does not make any urine. Patient states he was having shortness of breath. Patient admitted to telemetry for Acute Hyperkalemia 2/2 missed dialysis found to have pulmonary edema and BNP [id number]. Cardiology and nephro following.     Additionally pt had coffee ground emesis and abdominal pain, repeat CT A/P was negative for small bowel obstruction. But noted for cholelithiasis and trace pericholecystic fluid. Surgery following, concern for possible acute cholecystitis, HIDA scan was negative for acute cholecystitis. Additionally GI was consulted for possible endoscopy due to recent anemia and coffee ground emesis, no additional intervention at this time.     Hospital course complicated by Acute Hypoxic Respiratory Failure 2/2 worsening CHF. Patient was a rapid response due to hypoxia of 70-80% on room air, now requiring 4L oxygen via nasal cannula. Repeat CXR showing worsening pulmonary edema. Pt also found to have small bilateral pleural effusions, bilateral atelectasis and possible aspiration pneumonia, patient was started on cefepime and flagyl, ID Dr. Newby was consulted.    Patient refused dialysis today 8/5, continues to have intermittent nausea/vomiting.  Brother, Georgi Persaud aware of care plan,  pt noncompliance with treatment recommendations, refusing blood work and HD plan of care.   HIDA negative for cholecystitis, cardiology consult noted, CHF likely 2/2 fluid overload after missing dialysis op.  Palliative on board for ongoing GOC conversations, pt wishes for full code     Hg 7.1, pt received one unit of blood during HD

## 2023-08-08 NOTE — PROGRESS NOTE ADULT - PROBLEM SELECTOR PROBLEM 1
Problem: Potential for Falls  Goal: Patient will remain free of falls  Description: INTERVENTIONS:  - Educate patient/family on patient safety including physical limitations  - Instruct patient to call for assistance with activity   - Consult OT/PT to assist with strengthening/mobility   - Keep Call bell within reach  - Keep bed low and locked with side rails adjusted as appropriate  - Keep care items and personal belongings within reach  - Initiate and maintain comfort rounds  - Make Fall Risk Sign visible to staff  - Offer Toileting every 2 Hours, in advance of need  - Initiate/Maintain bed alarm  - Obtain necessary fall risk management equipment: alarms  - Apply yellow socks and bracelet for high fall risk patients  - Consider moving patient to room near nurses station  Outcome: Progressing     Problem: MOBILITY - ADULT  Goal: Maintain or return to baseline ADL function  Description: INTERVENTIONS:  -  Assess patient's ability to carry out ADLs; assess patient's baseline for ADL function and identify physical deficits which impact ability to perform ADLs (bathing, care of mouth/teeth, toileting, grooming, dressing, etc )  - Assess/evaluate cause of self-care deficits   - Assess range of motion  - Assess patient's mobility; develop plan if impaired  - Assess patient's need for assistive devices and provide as appropriate  - Encourage maximum independence but intervene and supervise when necessary  - Involve family in performance of ADLs  - Assess for home care needs following discharge   - Consider OT consult to assist with ADL evaluation and planning for discharge  - Provide patient education as appropriate  Outcome: Progressing  Goal: Maintains/Returns to pre admission functional level  Description: INTERVENTIONS:  - Perform BMAT or MOVE assessment daily    - Set and communicate daily mobility goal to care team and patient/family/caregiver     - Collaborate with rehabilitation services on mobility goals if consulted  - Ambulate patient 3 times a day  - Out of bed to chair 3 times a day   - Out of bed for meals 3 times a day  - Out of bed for toileting  - Record patient progress and toleration of activity level   Outcome: Progressing     Problem: PAIN - ADULT  Goal: Verbalizes/displays adequate comfort level or baseline comfort level  Description: Interventions:  - Encourage patient to monitor pain and request assistance  - Assess pain using appropriate pain scale  - Administer analgesics based on type and severity of pain and evaluate response  - Implement non-pharmacological measures as appropriate and evaluate response  - Consider cultural and social influences on pain and pain management  - Notify physician/advanced practitioner if interventions unsuccessful or patient reports new pain  Outcome: Progressing     Problem: DISCHARGE PLANNING  Goal: Discharge to home or other facility with appropriate resources  Description: INTERVENTIONS:  - Identify barriers to discharge w/patient and caregiver  - Arrange for needed discharge resources and transportation as appropriate  - Identify discharge learning needs (meds, wound care, etc )  - Refer to Case Management Department for coordinating discharge planning if the patient needs post-hospital services based on physician/advanced practitioner order or complex needs related to functional status, cognitive ability, or social support system  Outcome: Progressing     Problem: Knowledge Deficit  Goal: Patient/family/caregiver demonstrates understanding of disease process, treatment plan, medications, and discharge instructions  Description: Complete learning assessment and assess knowledge base    Interventions:  - Provide teaching at level of understanding  - Provide teaching via preferred learning methods  Outcome: Progressing     Problem: CARDIOVASCULAR - ADULT  Goal: Maintains optimal cardiac output and hemodynamic stability  Description: INTERVENTIONS:  - Monitor I/O, vital signs and rhythm  - Monitor for S/S and trends of decreased cardiac output  - Administer and titrate ordered vasoactive medications to optimize hemodynamic stability  - Assess quality of pulses, skin color and temperature  - Assess for signs of decreased coronary artery perfusion  - Instruct patient to report change in severity of symptoms  Outcome: Progressing  Goal: Absence of cardiac dysrhythmias or at baseline rhythm  Description: INTERVENTIONS:  - Continuous cardiac monitoring, vital signs, obtain 12 lead EKG if ordered  - Administer antiarrhythmic and heart rate control medications as ordered  - Monitor electrolytes and administer replacement therapy as ordered  Outcome: Progressing Acute respiratory failure with hypoxia

## 2023-08-08 NOTE — CONSULT NOTE ADULT - PROBLEM SELECTOR RECOMMENDATION 9
Pt with intermittent neck and back pain which is somatic and neuropathic in nature due to history of lumbosacral spinal stenosis, osteoarthritis, and diabetic peripheral neuropathy.   Optimize Non-opioid pain recommendations   - Start Acetaminophen 975 mg PO q 8 hours PRN for moderate pain  - Patient with eGFR of 3* current Cr. 17.40 and history of missed HD sessions. Gabapentin not recommended at this time.  - Start Lidoderm 4% patch daily.   Mild pain (score 1-3)  - Non-pharmacological pain treatment recommendations  - Warm/ Cool packs PRN   - Repositioning extremity, elevation, imagery, relaxation, distraction.  - Physical therapy OOB if no contraindications   Recommendations discussed with primary team and RN Pt with intermittent neck and back pain which is somatic and neuropathic in nature due to history of lumbosacral spinal stenosis, osteoarthritis, and diabetic peripheral neuropathy. + hx left AKA.   Optimize Non-opioid pain recommendations   - Start Acetaminophen 975 mg PO q 8 hours PRN for moderate pain  - Patient with eGFR of 3* current Cr. 17.40 and history of missed HD sessions. Gabapentin not recommended at this time.  - Start Lidoderm 4% patch daily.   Mild pain (score 1-3)  - Non-pharmacological pain treatment recommendations  - Warm/ Cool packs PRN   - Repositioning extremity, elevation, imagery, relaxation, distraction.  - Physical therapy OOB if no contraindications   Recommendations discussed with primary team and RN

## 2023-08-08 NOTE — CHART NOTE - NSCHARTNOTEFT_GEN_A_CORE
EVENT:   8/7/23, 8:06pm, patient with hx. OM of vertebra, c/o severe back pain 8/10 level. Patient requested only IV medication. BP - 147/80, HR - 73. Pt. refused his evening po medications.   HPI:   62 year old male from [facility], walks with RW, with PMH of ESRD on HD (TTS), BKA- L w/prosthetic leg, DM, Left eye blindness, CHF, CAD, HTN, HLD, osteoporosis, bronchitis, current smoker, and anemia who presents with complaint of missed dialysis. Last HD 7/22. Pt states he often missed HD sessions.  patient states he missed this HD session due to mild pain in left leg, patient remains able to ambulate. Pt complains of right leg swelling and states he does not make any urine. Patient states he was having mild shortness of breath.     OBJECTIVE:  Vital Signs Last 24 Hrs  T(C): 36.3 (07 Aug 2023 20:15), Max: 36.9 (07 Aug 2023 16:10)  T(F): 97.4 (07 Aug 2023 20:15), Max: 98.4 (07 Aug 2023 16:10)  HR: 73 (07 Aug 2023 20:15) (73 - 92)  BP: 147/80 (07 Aug 2023 20:15) (139/60 - 172/92)  BP(mean): --  RR: 16 (07 Aug 2023 20:15) (16 - 18)  SpO2: 97% (07 Aug 2023 20:15) (92% - 97%)    Parameters below as of 07 Aug 2023 20:15  Patient On (Oxygen Delivery Method): nasal cannula  O2 Flow (L/min): 2      PLAN:   - Ofirmev (acetaminophen ) 1000 mg IV PB x 1 dose for pain,   - Pain consult for acute back pain, OM fo vertebra.     FOLLOW UP / RESULT:   - Re-evaluate patient pain level,   - Maintain patient comfort and safety measures.

## 2023-08-08 NOTE — CONSULT NOTE ADULT - CONSULT REQUESTED DATE/TIME
01-Aug-2023 14:57
28-Jul-2023 10:45
08-Aug-2023 08:21
30-Jul-2023 17:18
27-Jul-2023 12:27
30-Jul-2023 16:18
31-Jul-2023 13:42
28-Jul-2023 12:59

## 2023-08-08 NOTE — PROGRESS NOTE ADULT - SUBJECTIVE AND OBJECTIVE BOX
NP Note discussed with  primary attending    Patient is a 62y old  Male who presents with a chief complaint of Missed dialysis (08 Aug 2023 11:40)      INTERVAL HPI/OVERNIGHT EVENTS: no new complaints    MEDICATIONS  (STANDING):  albuterol/ipratropium for Nebulization 3 milliLiter(s) Nebulizer every 6 hours  amLODIPine   Tablet 10 milliGRAM(s) Oral daily  aspirin enteric coated 81 milliGRAM(s) Oral daily  atorvastatin 40 milliGRAM(s) Oral at bedtime  budesonide 160 MICROgram(s)/formoterol 4.5 MICROgram(s) Inhaler 2 Puff(s) Inhalation two times a day  chlorhexidine 2% Cloths 1 Application(s) Topical daily  dextrose 5%. 1000 milliLiter(s) (50 mL/Hr) IV Continuous <Continuous>  dextrose 5%. 1000 milliLiter(s) (100 mL/Hr) IV Continuous <Continuous>  dextrose 50% Injectable 25 Gram(s) IV Push once  dextrose 50% Injectable 12.5 Gram(s) IV Push once  dextrose 50% Injectable 25 Gram(s) IV Push once  epoetin beverley (PROCRIT) Injectable 92912 Unit(s) IV Push <User Schedule>  folic acid 1 milliGRAM(s) Oral daily  furosemide   Injectable 40 milliGRAM(s) IV Push daily  glucagon  Injectable 1 milliGRAM(s) IntraMuscular once  hydrALAZINE Injectable 5 milliGRAM(s) IV Push three times a day  insulin glargine Injectable (LANTUS) 4 Unit(s) SubCutaneous at bedtime  latanoprost 0.005% Ophthalmic Solution 1 Drop(s) Both EYES at bedtime  levothyroxine 25 MICROGram(s) Oral daily  lidocaine   4% Patch 2 Patch Transdermal daily  metoprolol tartrate Injectable 5 milliGRAM(s) IV Push every 12 hours  pantoprazole  Injectable 40 milliGRAM(s) IV Push every 12 hours  sertraline 200 milliGRAM(s) Oral daily  sevelamer carbonate 800 milliGRAM(s) Oral three times a day with meals  sucralfate 1 Gram(s) Oral four times a day    MEDICATIONS  (PRN):  acetaminophen     Tablet .. 975 milliGRAM(s) Oral every 8 hours PRN Moderate Pain (4 - 6)  dextrose Oral Gel 15 Gram(s) Oral once PRN Blood Glucose LESS THAN 70 milliGRAM(s)/deciliter  haloperidol    Injectable 0.5 milliGRAM(s) IntraMuscular every 8 hours PRN Agitation  LORazepam   Injectable 0.5 milliGRAM(s) IV Push every 4 hours PRN agitation and nausea  ondansetron   Disintegrating Tablet 4 milliGRAM(s) Oral two times a day PRN Nausea and/or Vomiting  ondansetron Injectable 4 milliGRAM(s) IV Push every 6 hours PRN Nausea and/or Vomiting      __________________________________________________  REVIEW OF SYSTEMS:    CONSTITUTIONAL: No fever,   RESPIRATORY: No cough; No shortness of breath  CARDIOVASCULAR: No chest pain, no palpitations  GASTROINTESTINAL: No pain. No nausea or vomiting; No diarrhea   NEUROLOGICAL: No headache or numbness, no tremors  MUSCULOSKELETAL: No joint pain, no muscle pain  GENITOURINARY: no dysuria,    Vital Signs Last 24 Hrs  T(C): 37.1 (08 Aug 2023 05:04), Max: 37.1 (08 Aug 2023 05:04)  T(F): 98.8 (08 Aug 2023 05:04), Max: 98.8 (08 Aug 2023 05:04)  HR: 91 (08 Aug 2023 05:04) (73 - 91)  BP: 158/77 (08 Aug 2023 05:04) (147/80 - 172/92)  BP(mean): --  RR: 18 (08 Aug 2023 05:04) (16 - 18)  SpO2: 92% (08 Aug 2023 05:04) (92% - 97%)    Parameters below as of 08 Aug 2023 05:04  Patient On (Oxygen Delivery Method): room air        ________________________________________________  PHYSICAL EXAM:  GENERAL: NAD  CHEST/LUNG: Clear to ausculitation bilaterally   HEART: S1 S2  regular; no murmurs, gallops or rubs  ABDOMEN: Soft, Nontender, Nondistended; Bowel sounds present  EXTREMITIES: left bka  SKIN: warm and dry; no rash  NERVOUS SYSTEM:  Awake and alert; Oriented  to place, person and time ; no new deficits    _________________________________________________  LABS:                        7.1    2.90  )-----------( 83       ( 08 Aug 2023 07:05 )             23.0     08-08    140  |  102  |  83<H>  ----------------------------<  99  4.1   |  21<L>  |  17.40<H>    Ca    7.5<L>      08 Aug 2023 07:05  Phos  7.2     08-08  Mg     2.8     08-08        Urinalysis Basic - ( 08 Aug 2023 07:05 )    Color: x / Appearance: x / SG: x / pH: x  Gluc: 99 mg/dL / Ketone: x  / Bili: x / Urobili: x   Blood: x / Protein: x / Nitrite: x   Leuk Esterase: x / RBC: x / WBC x   Sq Epi: x / Non Sq Epi: x / Bacteria: x      CAPILLARY BLOOD GLUCOSE      POCT Blood Glucose.: 138 mg/dL (08 Aug 2023 12:25)  POCT Blood Glucose.: 135 mg/dL (08 Aug 2023 08:32)        RADIOLOGY & ADDITIONAL TESTS:    Imaging Personally Reviewed:  YES/NO    Consultant(s) Notes Reviewed:   YES/ No    Care Discussed with Consultants :     Plan of care was discussed with patient and /or primary care giver; all questions and concerns were addressed and care was aligned with patient's wishes.

## 2023-08-08 NOTE — PROGRESS NOTE ADULT - SUBJECTIVE AND OBJECTIVE BOX
Time of Visit:  Patient seen and examined. pat seen earlier today     MEDICATIONS  (STANDING):  albuterol/ipratropium for Nebulization 3 milliLiter(s) Nebulizer every 6 hours  amLODIPine   Tablet 10 milliGRAM(s) Oral daily  aspirin enteric coated 81 milliGRAM(s) Oral daily  atorvastatin 40 milliGRAM(s) Oral at bedtime  budesonide 160 MICROgram(s)/formoterol 4.5 MICROgram(s) Inhaler 2 Puff(s) Inhalation two times a day  chlorhexidine 2% Cloths 1 Application(s) Topical daily  dextrose 5%. 1000 milliLiter(s) (50 mL/Hr) IV Continuous <Continuous>  dextrose 5%. 1000 milliLiter(s) (100 mL/Hr) IV Continuous <Continuous>  dextrose 50% Injectable 25 Gram(s) IV Push once  dextrose 50% Injectable 25 Gram(s) IV Push once  dextrose 50% Injectable 12.5 Gram(s) IV Push once  epoetin beverley (PROCRIT) Injectable 73574 Unit(s) IV Push <User Schedule>  folic acid 1 milliGRAM(s) Oral daily  furosemide   Injectable 40 milliGRAM(s) IV Push daily  glucagon  Injectable 1 milliGRAM(s) IntraMuscular once  hydrALAZINE Injectable 5 milliGRAM(s) IV Push three times a day  insulin glargine Injectable (LANTUS) 4 Unit(s) SubCutaneous at bedtime  latanoprost 0.005% Ophthalmic Solution 1 Drop(s) Both EYES at bedtime  levothyroxine 25 MICROGram(s) Oral daily  lidocaine   4% Patch 2 Patch Transdermal daily  metoprolol tartrate Injectable 5 milliGRAM(s) IV Push every 12 hours  pantoprazole  Injectable 40 milliGRAM(s) IV Push every 12 hours  sertraline 200 milliGRAM(s) Oral daily  sevelamer carbonate 800 milliGRAM(s) Oral three times a day with meals  sucralfate 1 Gram(s) Oral four times a day      MEDICATIONS  (PRN):  acetaminophen     Tablet .. 975 milliGRAM(s) Oral every 8 hours PRN Moderate Pain (4 - 6)  dextrose Oral Gel 15 Gram(s) Oral once PRN Blood Glucose LESS THAN 70 milliGRAM(s)/deciliter  haloperidol    Injectable 0.5 milliGRAM(s) IntraMuscular every 8 hours PRN Agitation  LORazepam   Injectable 0.5 milliGRAM(s) IV Push every 4 hours PRN agitation and nausea  ondansetron   Disintegrating Tablet 4 milliGRAM(s) Oral two times a day PRN Nausea and/or Vomiting  ondansetron Injectable 4 milliGRAM(s) IV Push every 6 hours PRN Nausea and/or Vomiting       Medications up to date at time of exam.      PHYSICAL EXAMINATION:  Patient has no new complaints.  GENERAL: The patient is a well-developed, well-nourished, in no apparent distress.     Vital Signs Last 24 Hrs  T(C): 37 (08 Aug 2023 14:24), Max: 37.1 (08 Aug 2023 05:04)  T(F): 98.6 (08 Aug 2023 14:24), Max: 98.8 (08 Aug 2023 05:04)  HR: 89 (08 Aug 2023 14:24) (73 - 91)  BP: 163/80 (08 Aug 2023 14:24) (147/80 - 163/80)  BP(mean): --  RR: 18 (08 Aug 2023 14:24) (16 - 18)  SpO2: 93% (08 Aug 2023 14:24) (92% - 97%)    Parameters below as of 08 Aug 2023 14:24  Patient On (Oxygen Delivery Method): room air       (if applicable)    Chest Tube (if applicable)    HEENT: Head is normocephalic and atraumatic. Extraocular muscles are intact. Mucous membranes are moist.     NECK: Supple, no palpable adenopathy.    LUNGS: Clear to auscultation, no wheezing, rales, or rhonchi.    HEART: Regular rate and rhythm without murmur.    ABDOMEN: Soft, nontender, and nondistended.  No hepatosplenomegaly is noted.    : No painful voiding, no pelvic pain    EXTREMITIES:  L BKA    NEUROLOGIC: Awake, a    SKIN: Warm, dry, good turgor.      LABS:                        7.1    2.90  )-----------( 83       ( 08 Aug 2023 07:05 )             23.0     08-08    140  |  102  |  83<H>  ----------------------------<  99  4.1   |  21<L>  |  17.40<H>    Ca    7.5<L>      08 Aug 2023 07:05  Phos  7.2     08-08  Mg     2.8     08-08        Urinalysis Basic - ( 08 Aug 2023 07:05 )    Color: x / Appearance: x / SG: x / pH: x  Gluc: 99 mg/dL / Ketone: x  / Bili: x / Urobili: x   Blood: x / Protein: x / Nitrite: x   Leuk Esterase: x / RBC: x / WBC x   Sq Epi: x / Non Sq Epi: x / Bacteria: x                      MICROBIOLOGY: (if applicable)    RADIOLOGY & ADDITIONAL STUDIES:  EKG:   CXR:  ECHO:    IMPRESSION: 62y Male 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      Osteoarthritis      Vision loss of left eye  blind      ESRD on hemodialysis  T/Th/S      Cataract  both eyes - hx of sx done      BPH (benign prostatic hyperplasia)      UTI (urinary tract infection)  hx of      Bladder mass  hx of      H/O hematuria      Osteoporosis      Vision loss of right eye  decreased      Depression      Chronic GERD      Osteomyelitis of vertebra      CHF (congestive heart failure)      Below knee amputation status, left  2012- pt is wearing prostesis      History of right cataract extraction      History of left cataract extraction      S/P arteriovenous (AV) fistula creation  right arm brachiocephalic arteriovenous fistula on 11/08/2018      H/O hematuria  s/p bladder bx and fulguration 2/25/2020      H/O transurethral destruction of bladder lesion  2020      History of excision of mass  back mass on 03/31/2021       p/w         Impression: This is a 63 Y/O Male from [facility] with ESRD on HD ( T-Th-Sat ). Current smoker . Presented to ED due to Missed Dialysis, last HD 07-22-23 . Per Patient stated that he often missed HD sessions due to Mild left leg pain and right leg swelling . S/p RRT for SOB with Hypoxia due to Acute Hypoxic Respiratory Failure secondary to Pulmonary edema/ Fluid overload due to Missed Dialysis . CT Abdomen with Small Bilateral Pleural Effusions, Rt Lower Lung with groundglass opacity. No bowel obstruction. Small Gall Stone.   Vomiting due to diabetic gastroparesis vs acute cholecystitis  unlikely SBO .       Suggestions:  O2 saturation 96% with O2 supplement. Continue Oxygen supplementation 2L NC.   Continue Duoneb via nebulization Q 6 Hours .   On lasix 40 mg IVP Daily .  Reinforced the importance of compliance to Dialysis schedule.   Aspiration precaution with HOB elevation especially during meal times.

## 2023-08-08 NOTE — CONSULT NOTE ADULT - SUBJECTIVE AND OBJECTIVE BOX
Source of information: KIAN VALERO, Chart review  Patient language: English  : n/a    HPI:  Patient is 62 year old male from [facility], walks with RW, with PMH of ESRD on HD (TTS), BKA- L w/prosthetic leg, DM, Left eye blindness, CHF, CAD, HTN, HLD, osteoporosis, bronchitis, current smoker, and anemia who presents with complaint of missed dialysis. Last HD 7/22. Pt states he often missed HD sessions.  patient states he missed this HD session due to mild pain in left leg, patient remains able to ambulate. Pt complains of right leg swelling and states he does not make any urine. Patient states he was having mild shortness of breath.  Pt denies any fever, chills, N/V/D, constipation, CP, numbness or tingling (26 Jul 2023 19:20)    Patient is a 62y old  Male who presents with a chief complaint of Missed dialysis (08 Aug 2023 09:49)    Pt is admitted for missed hemodialysis session with hypervolemia and SOB. Pain consulted 8/8 for back pain. Pt seen and examined while sitting in bedside chair, this morning. On assessment patient denied current back pain and reported neck pain 8/10 SCALE USED: (1-10 VNRS). Pt describes neck pain as intermittent and throbbing in nature accompanied by numbness and tingling to bilateral hands. Patient reports that this pain is not new and "I've had it for years." Patient states the pain is exacerbated by movement and reports not getting adequate sleep in the hospital due to interruptions. Patient also endorses chronic right lower extremity numbness/tingling. Pt tolerating PO diet. Denies lethargy, chest pain, SOB, nausea, vomiting, constipation. Reports last BM 8/7. Patient stated goal for pain control: to be able to take deep breaths, get out of bed to chair and ambulate with tolerable pain control. Pt denies taking medications for pain at home and reports wanting to go home today.     PAST MEDICAL & SURGICAL HISTORY:  Hypertension    Adrenal insufficiency  h/o    Anemia    Glaucoma    Coronary artery disease    HLD (hyperlipidemia)    Peripheral vascular disease    Spinal stenosis of lumbosacral region    Hyperparathyroidism    Diabetes mellitus    Diabetic neuropathy    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    Osteoarthritis    Vision loss of left eye  blind    ESRD on hemodialysis  T/Th/S    Cataract  both eyes - hx of sx done    BPH (benign prostatic hyperplasia)    UTI (urinary tract infection)  hx of    Bladder mass  hx of    H/O hematuria    Osteoporosis    Vision loss of right eye  decreased    Depression    Chronic GERD    Osteomyelitis of vertebra    CHF (congestive heart failure)    Below knee amputation status, left  2012- pt is wearing prostesis    History of right cataract extraction    History of left cataract extraction    S/P arteriovenous (AV) fistula creation  right arm brachiocephalic arteriovenous fistula on 11/08/2018    H/O hematuria  s/p bladder bx and fulguration 2/25/2020    H/O transurethral destruction of bladder lesion  2020    History of excision of mass  back mass on 03/31/2021    FAMILY HISTORY:  Family history of cirrhosis of liver (Mother)    Family history of renal failure (Sibling)    Family history of hypertension (Sibling)    Family history of diabetes mellitus (Sibling)    History of substance abuse in sibling (Sibling)    Social History:  from [facility], walks with RW (26 Jul 2023 19:20)  [x] Denies ETOH use, illicit drug use and smoking    Allergies    No Known Drug Allergies  fish (Rash)  liver (Anaphylaxis)    Intolerances    MEDICATIONS  (STANDING):  albuterol/ipratropium for Nebulization 3 milliLiter(s) Nebulizer every 6 hours  amLODIPine   Tablet 10 milliGRAM(s) Oral daily  aspirin enteric coated 81 milliGRAM(s) Oral daily  atorvastatin 40 milliGRAM(s) Oral at bedtime  budesonide 160 MICROgram(s)/formoterol 4.5 MICROgram(s) Inhaler 2 Puff(s) Inhalation two times a day  chlorhexidine 2% Cloths 1 Application(s) Topical daily  dextrose 5%. 1000 milliLiter(s) (50 mL/Hr) IV Continuous <Continuous>  dextrose 5%. 1000 milliLiter(s) (100 mL/Hr) IV Continuous <Continuous>  dextrose 50% Injectable 25 Gram(s) IV Push once  dextrose 50% Injectable 12.5 Gram(s) IV Push once  dextrose 50% Injectable 25 Gram(s) IV Push once  epoetin beverley (PROCRIT) Injectable 93628 Unit(s) IV Push <User Schedule>  folic acid 1 milliGRAM(s) Oral daily  furosemide   Injectable 40 milliGRAM(s) IV Push daily  glucagon  Injectable 1 milliGRAM(s) IntraMuscular once  hydrALAZINE Injectable 5 milliGRAM(s) IV Push three times a day  insulin glargine Injectable (LANTUS) 4 Unit(s) SubCutaneous at bedtime  latanoprost 0.005% Ophthalmic Solution 1 Drop(s) Both EYES at bedtime  levothyroxine 25 MICROGram(s) Oral daily  lidocaine   4% Patch 2 Patch Transdermal daily  metoprolol tartrate Injectable 5 milliGRAM(s) IV Push every 12 hours  pantoprazole  Injectable 40 milliGRAM(s) IV Push every 12 hours  sertraline 200 milliGRAM(s) Oral daily  sevelamer carbonate 800 milliGRAM(s) Oral three times a day with meals  sucralfate 1 Gram(s) Oral four times a day    MEDICATIONS  (PRN):  acetaminophen     Tablet .. 975 milliGRAM(s) Oral every 8 hours PRN Moderate Pain (4 - 6)  dextrose Oral Gel 15 Gram(s) Oral once PRN Blood Glucose LESS THAN 70 milliGRAM(s)/deciliter  haloperidol    Injectable 0.5 milliGRAM(s) IntraMuscular every 8 hours PRN Agitation  LORazepam   Injectable 0.5 milliGRAM(s) IV Push every 4 hours PRN agitation and nausea  ondansetron   Disintegrating Tablet 4 milliGRAM(s) Oral two times a day PRN Nausea and/or Vomiting  ondansetron Injectable 4 milliGRAM(s) IV Push every 6 hours PRN Nausea and/or Vomiting    Vital Signs Last 24 Hrs  T(C): 37.1 (08 Aug 2023 05:04), Max: 37.1 (08 Aug 2023 05:04)  T(F): 98.8 (08 Aug 2023 05:04), Max: 98.8 (08 Aug 2023 05:04)  HR: 91 (08 Aug 2023 05:04) (73 - 91)  BP: 158/77 (08 Aug 2023 05:04) (147/80 - 172/92)  BP(mean): --  RR: 18 (08 Aug 2023 05:04) (16 - 18)  SpO2: 92% (08 Aug 2023 05:04) (92% - 97%)    Parameters below as of 08 Aug 2023 05:04  Patient On (Oxygen Delivery Method): room air    LABS: Reviewed.                      7.1    2.90  )-----------( 83       ( 08 Aug 2023 07:05 )             23.0     08-08    140  |  102  |  83<H>  ----------------------------<  99  4.1   |  21<L>  |  17.40<H>    Ca    7.5<L>      08 Aug 2023 07:05  Phos  7.2     08-08  Mg     2.8     08-08    Urinalysis Basic - ( 08 Aug 2023 07:05 )    Color: x / Appearance: x / SG: x / pH: x  Gluc: 99 mg/dL / Ketone: x  / Bili: x / Urobili: x   Blood: x / Protein: x / Nitrite: x   Leuk Esterase: x / RBC: x / WBC x   Sq Epi: x / Non Sq Epi: x / Bacteria: x    CAPILLARY BLOOD GLUCOSE    POCT Blood Glucose.: 135 mg/dL (08 Aug 2023 08:32)  POCT Blood Glucose.: 81 mg/dL (07 Aug 2023 11:40)    COVID-19 PCR: NotDetec (11 Jul 2023 10:38)  COVID-19 PCR: NotDetec (30 May 2023 10:31)  COVID-19 PCR: NotDetec (22 Mar 2023 09:25)  SARS-CoV-2: NotDetec (15 Mar 2023 12:54)    Radiology: Reviewed.     ORT Score -   Family Hx of substance abuse	Female	      Male  Alcohol 	                                           1                     3  Illegal drugs	                                   2                     3  Rx drugs                                           4 	                  4  Personal Hx of substance abuse		  Alcohol 	                                          3	                  3  Illegal drugs                                     4	                  4  Rx drugs                                            5 	                  5  Age between 16- 45 years	           1                     1  hx preadolescent sexual abuse	   3 	                  0  Psychological disease		  ADD, OCD, bipolar, schizophrenia   2	          2  Depression                                           1 	          1  Total: 0    a score of 3 or lower indicates low risk for opioid abuse		  a score of 4-7 indicates moderate risk for opioid abuse		  a score of 8 or higher indicates high risk for opioid abuse  	  REVIEW OF SYSTEMS:  CONSTITUTIONAL: No fever or fatigue  HEENT:  No difficulty hearing, no change in vision  NECK: + neck pain accompanied by numbness and tingling to bilateral hands (chronic)  RESPIRATORY: No cough, wheezing, chills or hemoptysis; No shortness of breath  CARDIOVASCULAR: No chest pain, palpitations, dizziness, or leg swelling  GASTROINTESTINAL: No loss of appetite, decreased PO intake. No abdominal or epigastric pain. No nausea, vomiting; No diarrhea or constipation.   GENITOURINARY: Anuric  MUSCULOSKELETAL: No joint pain or swelling; + intermittent back pain, + Left AKA, no right lower extremity motor strength weakness, no saddle anesthesia, bowel/bladder incontinence, no falls   NEURO: No headaches, Bilateral hand and right lower extremity numbness tingling (chronic)  PSYCHIATRIC: History of anxiety, reports difficulty sleeping while in hopital    PHYSICAL EXAM:  GENERAL: Alert & Oriented X4, mild agitation, NAD. Speech is clear.   RESPIRATORY: Respirations even and unlabored. Clear to auscultation bilaterally; No rales, rhonchi, wheezing, or rubs  CARDIOVASCULAR: Normal S1/S2, regular rate and rhythm; No murmurs, rubs, or gallops. No JVD.   GASTROINTESTINAL:  Soft, Nontender, Nondistended; Bowel sounds present  PERIPHERAL VASCULAR:  Extremities warm without edema. R AVF +bruit/thrill. unable to palpate right lower extremity pulses, No cyanosis, No calf tenderness, +L AKA  MUSCULOSKELETAL: Motor Strength 4/5 B/L upper and right lower extremities; ROM intact; No tenderness on palpation of all joints.   SKIN: Warm, dry, right lower extremity peripheral vascular disease    Risk factors associated with adverse outcomes related to opioid treatment  [ ] Concurrent benzodiazepine use  [ ] History/ Active substance use or alcohol use disorder  [ ] Psychiatric co-morbidity  [ ] Sleep apnea  [ ] COPD  [ ] BMI> 35  [ ] Liver dysfunction  [x] Renal dysfunction  [ ] CHF  [ ] Smoker  [x] Age > 60 years    [x]  [place]  Reviewed and Copied to Chart. See below.    Plan of care and goal oriented pain management treatment options were discussed with patient and /or primary care giver; all questions and concerns were addressed and care was aligned with patient's wishes.    Educated patient on goal oriented pain management treatment options      Source of information: KIAN VALERO, Chart review  Patient language: English  : n/a    HPI:  Patient is 62 year old male from [facility], walks with RW, with PMH of ESRD on HD (TTS), BKA- L w/prosthetic leg, DM, Left eye blindness, CHF, CAD, HTN, HLD, osteoporosis, bronchitis, current smoker, and anemia who presents with complaint of missed dialysis. Last HD 7/22. Pt states he often missed HD sessions.  patient states he missed this HD session due to mild pain in left leg, patient remains able to ambulate. Pt complains of right leg swelling and states he does not make any urine. Patient states he was having mild shortness of breath.  Pt denies any fever, chills, N/V/D, constipation, CP, numbness or tingling (26 Jul 2023 19:20)    Patient is a 62y old  Male who presents with a chief complaint of Missed dialysis (08 Aug 2023 09:49)    Pt is admitted for missed hemodialysis session with hypervolemia and SOB. Pain consulted 8/8 for back pain. Pt seen and examined while sitting in bedside chair, this morning. On assessment patient denied current back pain and reported neck pain 8/10 SCALE USED: (1-10 VNRS). Pt describes neck pain as intermittent and throbbing in nature accompanied by numbness and tingling to bilateral hands. Patient reports that this pain is not new and "I've had it for years." Patient states the pain is exacerbated by movement and reports not getting adequate sleep in the hospital due to interruptions. Patient also endorses chronic right lower extremity numbness/tingling. Pt tolerating PO diet. Denies lethargy, chest pain, SOB, nausea, vomiting, constipation. Reports last BM 8/7. Patient stated goal for pain control: to be able to take deep breaths, get out of bed to chair and ambulate with tolerable pain control. Pt denies taking medications for pain at home and reports wanting to go home today.     PAST MEDICAL & SURGICAL HISTORY:  Hypertension    Adrenal insufficiency  h/o    Anemia    Glaucoma    Coronary artery disease    HLD (hyperlipidemia)    Peripheral vascular disease    Spinal stenosis of lumbosacral region    Hyperparathyroidism    Diabetes mellitus    Diabetic neuropathy    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    Osteoarthritis    Vision loss of left eye  blind    ESRD on hemodialysis  T/Th/S    Cataract  both eyes - hx of sx done    BPH (benign prostatic hyperplasia)    UTI (urinary tract infection)  hx of    Bladder mass  hx of    H/O hematuria    Osteoporosis    Vision loss of right eye  decreased    Depression    Chronic GERD    Osteomyelitis of vertebra    CHF (congestive heart failure)    Below knee amputation status, left  2012- pt is wearing prostesis    History of right cataract extraction    History of left cataract extraction    S/P arteriovenous (AV) fistula creation  right arm brachiocephalic arteriovenous fistula on 11/08/2018    H/O hematuria  s/p bladder bx and fulguration 2/25/2020    H/O transurethral destruction of bladder lesion  2020    History of excision of mass  back mass on 03/31/2021    FAMILY HISTORY:  Family history of cirrhosis of liver (Mother)    Family history of renal failure (Sibling)    Family history of hypertension (Sibling)    Family history of diabetes mellitus (Sibling)    History of substance abuse in sibling (Sibling)    Social History:  from NH, walks with RW (26 Jul 2023 19:20)  [x] Denies ETOH use, illicit drug use and smoking    Allergies    No Known Drug Allergies  fish (Rash)  liver (Anaphylaxis)    Intolerances    MEDICATIONS  (STANDING):  albuterol/ipratropium for Nebulization 3 milliLiter(s) Nebulizer every 6 hours  amLODIPine   Tablet 10 milliGRAM(s) Oral daily  aspirin enteric coated 81 milliGRAM(s) Oral daily  atorvastatin 40 milliGRAM(s) Oral at bedtime  budesonide 160 MICROgram(s)/formoterol 4.5 MICROgram(s) Inhaler 2 Puff(s) Inhalation two times a day  chlorhexidine 2% Cloths 1 Application(s) Topical daily  dextrose 5%. 1000 milliLiter(s) (50 mL/Hr) IV Continuous <Continuous>  dextrose 5%. 1000 milliLiter(s) (100 mL/Hr) IV Continuous <Continuous>  dextrose 50% Injectable 25 Gram(s) IV Push once  dextrose 50% Injectable 12.5 Gram(s) IV Push once  dextrose 50% Injectable 25 Gram(s) IV Push once  epoetin beverley (PROCRIT) Injectable 88738 Unit(s) IV Push <User Schedule>  folic acid 1 milliGRAM(s) Oral daily  furosemide   Injectable 40 milliGRAM(s) IV Push daily  glucagon  Injectable 1 milliGRAM(s) IntraMuscular once  hydrALAZINE Injectable 5 milliGRAM(s) IV Push three times a day  insulin glargine Injectable (LANTUS) 4 Unit(s) SubCutaneous at bedtime  latanoprost 0.005% Ophthalmic Solution 1 Drop(s) Both EYES at bedtime  levothyroxine 25 MICROGram(s) Oral daily  lidocaine   4% Patch 2 Patch Transdermal daily  metoprolol tartrate Injectable 5 milliGRAM(s) IV Push every 12 hours  pantoprazole  Injectable 40 milliGRAM(s) IV Push every 12 hours  sertraline 200 milliGRAM(s) Oral daily  sevelamer carbonate 800 milliGRAM(s) Oral three times a day with meals  sucralfate 1 Gram(s) Oral four times a day    MEDICATIONS  (PRN):  acetaminophen     Tablet .. 975 milliGRAM(s) Oral every 8 hours PRN Moderate Pain (4 - 6)  dextrose Oral Gel 15 Gram(s) Oral once PRN Blood Glucose LESS THAN 70 milliGRAM(s)/deciliter  haloperidol    Injectable 0.5 milliGRAM(s) IntraMuscular every 8 hours PRN Agitation  LORazepam   Injectable 0.5 milliGRAM(s) IV Push every 4 hours PRN agitation and nausea  ondansetron   Disintegrating Tablet 4 milliGRAM(s) Oral two times a day PRN Nausea and/or Vomiting  ondansetron Injectable 4 milliGRAM(s) IV Push every 6 hours PRN Nausea and/or Vomiting    Vital Signs Last 24 Hrs  T(C): 37.1 (08 Aug 2023 05:04), Max: 37.1 (08 Aug 2023 05:04)  T(F): 98.8 (08 Aug 2023 05:04), Max: 98.8 (08 Aug 2023 05:04)  HR: 91 (08 Aug 2023 05:04) (73 - 91)  BP: 158/77 (08 Aug 2023 05:04) (147/80 - 172/92)  BP(mean): --  RR: 18 (08 Aug 2023 05:04) (16 - 18)  SpO2: 92% (08 Aug 2023 05:04) (92% - 97%)    Parameters below as of 08 Aug 2023 05:04  Patient On (Oxygen Delivery Method): room air    LABS: Reviewed.                      7.1    2.90  )-----------( 83       ( 08 Aug 2023 07:05 )             23.0     08-08    140  |  102  |  83<H>  ----------------------------<  99  4.1   |  21<L>  |  17.40<H>    Ca    7.5<L>      08 Aug 2023 07:05  Phos  7.2     08-08  Mg     2.8     08-08    Urinalysis Basic - ( 08 Aug 2023 07:05 )    Color: x / Appearance: x / SG: x / pH: x  Gluc: 99 mg/dL / Ketone: x  / Bili: x / Urobili: x   Blood: x / Protein: x / Nitrite: x   Leuk Esterase: x / RBC: x / WBC x   Sq Epi: x / Non Sq Epi: x / Bacteria: x    CAPILLARY BLOOD GLUCOSE    POCT Blood Glucose.: 135 mg/dL (08 Aug 2023 08:32)  POCT Blood Glucose.: 81 mg/dL (07 Aug 2023 11:40)    COVID-19 PCR: NotDetec (11 Jul 2023 10:38)  COVID-19 PCR: NotDetec (30 May 2023 10:31)  COVID-19 PCR: NotDetec (22 Mar 2023 09:25)  SARS-CoV-2: NotDetec (15 Mar 2023 12:54)    Radiology: Reviewed.   < from: NM Hepatobiliary Imaging w/ RX (07.31.23 @ 10:44) >    ACC: [id number] EXAM:  NM HEPATOBILIARY IMG W RX   ORDERED BY: SHAKIR MAC     PROCEDURE DATE:  07/31/2023        INTERPRETATION:  CLINICAL INFORMATION: 62-year-old man with RIGHT UPPER   quadrant pain referred for evaluation of acute cholecystitis.    DURATION of DYNAMIC SERIES: 45 minutes  RADIOPHARMACEUTICAL: 3.2 mCi Tc-99m-Mebrofenin, I.V.    TECHNIQUE: Dynamic imaging of the anterior abdomen was performed   following radiopharmaceutical injection. Patient declined static images.    COMPARISON: None    OTHER STUDIES USED FOR CORRELATION: CT abdomen/pelvis 7/30/2023    FINDINGS: There is prompt, homogeneous uptake of radiopharmaceutical by   the hepatocytes. Activity is seen in the gallbladder at approximately 15   minutes. Patient declined further imaging before bowel was visualized.   There is fair clearance of activity from the liver by the end of the   obtained images..    IMPRESSION: Patient declined to complete the full exam and bowel was not   visualized on the available images. Otherwise unremarkable exam with no   evidence of acute cholecystitis or biliary obstruction.    --- End of Report ---    JD TUCKER MD; Attending Radiologist  This document has been electronically signed. Jul 31 2023 10:59AM    < end of copied text >    < from: CT Abdomen and Pelvis No Cont (07.30.23 @ 13:35) >    ACC: [id number] EXAM:  CT ABDOMEN AND PELVIS   ORDERED BY: ALEXANDER MATIAS     PROCEDURE DATE:  07/30/2023        INTERPRETATION:  CLINICAL INFORMATION: Worsening nausea and vomiting with   dark colored emesis.    COMPARISON: 7/26/2023    CONTRAST/COMPLICATIONS:  IV Contrast: NONE  Oral Contrast: NONE  Complications: None reported at time of study completion    PROCEDURE:  CT of the Abdomen and Pelvis was performed.  Sagittal and coronal reformats were performed.    FINDINGS: The examination is limited by lack of IV contrast.  LOWER CHEST: Small bilateral pleural effusions. Small bilateral   atelectasis. Nonspecific groundglass densities in the right lower lung   zone; clinical correlation with pneumonia is suggested. Cardiomegaly and   mild pericardial effusion, unchanged.    LIVER: Within normal limits.  BILE DUCTS: Dilated common bile duct again noted.  GALLBLADDER: Small gallstones. Nonspecific trace pericholecystic fluid.  SPLEEN: Within normal limits.  PANCREAS: Dilated main pancreaticduct is again noted.  ADRENALS: The right adrenal appears unremarkable. Nonspecific left   adrenal thickening.  KIDNEYS/URETERS: Within normal limits except for a few small hypodense   lesions in the kidneys bilaterally, representing probable cysts.    BLADDER: Underdistended.  REPRODUCTIVE ORGANS: The prostate and seminal vesicles appear grossly   unremarkable.    BOWEL: No bowel obstruction. Appendix unremarkable. Nonspecific mild   distal esophageal mural thickening.  PERITONEUM: Small ascites. No free air.  VESSELS: Calcified atherosclerotic disease.  RETROPERITONEUM/LYMPH NODES: No lymphadenopathy.  ABDOMINAL WALL: Anasarca again noted.  BONES: No acute CT findings.    IMPRESSION: Small gallstones. Nonspecific trace pericholecystic fluid..   If there is a clinical suspicion for acute cholecystitis, gallbladder   ultrasound/HIDA scan may be pursued for further evaluation.    Stable dilated common bile duct and main pancreatic duct. Please see   previous MRCP dated 2/23/2023.    No bowel obstruction. Appendix unremarkable. Nonspecific mild distal   esophageal mural thickening.    Small ascites.    Anasarca.    Small bilateral pleural effusions. Small bilateral atelectasis.   Nonspecific groundglass densities in the right lower lungzone; clinical   correlation with pneumonia is suggested.    Cardiomegaly and mild pericardial effusion, unchanged.    --- End of Report ---    ROSALBA GREEN MD; Attending Radiologist  This document has been electronically signed. Jul 30 2023  2:23PM    < end of copied text >    < from: Xray Chest 1 View-PORTABLE IMMEDIATE (Xray Chest 1 View-PORTABLE IMMEDIATE .) (07.30.23 @ 09:28) >    ACC: [id number] EXAM:  XR CHEST PORTABLE IMMED 1V   ORDERED BY: JULIETA MUJICA     PROCEDURE DATE:  07/30/2023        INTERPRETATION:  AP semisupine chest on July 30, 2023 at 9:14 AM. 2   images. Patient is short of breath.    Gross heart enlargement again noted. There is increasing perihilar   infiltrate now mild to moderate compared to July 26.    IMPRESSION: Increasing CHF. Gross heart enlargement again noted.    --- End of Report ---    ARASH REYNOSO MD; Attending Radiologist  This document has been electronically signed. Jul 30 2023  4:03PM    < end of copied text >    ORT Score -   Family Hx of substance abuse	Female	      Male  Alcohol 	                                           1                     3  Illegal drugs	                                   2                     3  Rx drugs                                           4 	                  4  Personal Hx of substance abuse		  Alcohol 	                                          3	                  3  Illegal drugs                                     4	                  4  Rx drugs                                            5 	                  5  Age between 16- 45 years	           1                     1  hx preadolescent sexual abuse	   3 	                  0  Psychological disease		  ADD, OCD, bipolar, schizophrenia   2	          2  Depression                                           1 	          1  Total: 0    a score of 3 or lower indicates low risk for opioid abuse		  a score of 4-7 indicates moderate risk for opioid abuse		  a score of 8 or higher indicates high risk for opioid abuse  	  REVIEW OF SYSTEMS:  CONSTITUTIONAL: No fever or fatigue  HEENT:  No difficulty hearing, no change in vision  NECK: + neck pain accompanied by numbness and tingling to bilateral hands (chronic)  RESPIRATORY: No cough, wheezing, chills or hemoptysis; No shortness of breath  CARDIOVASCULAR: No chest pain, palpitations, dizziness, or leg swelling  GASTROINTESTINAL: No loss of appetite, decreased PO intake. No abdominal or epigastric pain. No nausea, vomiting; No diarrhea or constipation.   GENITOURINARY: Anuric  MUSCULOSKELETAL: No joint pain or swelling; + intermittent back pain, + Left AKA, no right lower extremity motor strength weakness, no saddle anesthesia, bowel/bladder incontinence, no falls   NEURO: No headaches, Bilateral hand and right lower extremity numbness tingling (chronic)  PSYCHIATRIC: History of anxiety, reports difficulty sleeping while in hopital    PHYSICAL EXAM:  GENERAL: Alert & Oriented X4, mild agitation, NAD. Speech is clear.   RESPIRATORY: Respirations even and unlabored. Clear to auscultation bilaterally; No rales, rhonchi, wheezing, or rubs  CARDIOVASCULAR: Normal S1/S2, regular rate and rhythm; No murmurs, rubs, or gallops. No JVD.   GASTROINTESTINAL:  Soft, Nontender, Nondistended; Bowel sounds present  PERIPHERAL VASCULAR:  Extremities warm without edema. R AVF +bruit/thrill. unable to palpate right lower extremity pulses, No cyanosis, No calf tenderness, +L AKA  MUSCULOSKELETAL: Motor Strength 4/5 B/L upper and right lower extremities; ROM intact; No tenderness on palpation of all joints.   SKIN: Warm, dry, right lower extremity peripheral vascular disease    Risk factors associated with adverse outcomes related to opioid treatment  [ ] Concurrent benzodiazepine use  [ ] History/ Active substance use or alcohol use disorder  [ ] Psychiatric co-morbidity  [ ] Sleep apnea  [ ] COPD  [ ] BMI> 35  [ ] Liver dysfunction  [x] Renal dysfunction  [ ] CHF  [ ] Smoker  [x] Age > 60 years    [x]  [place]  Reviewed and Copied to Chart. See below.    Plan of care and goal oriented pain management treatment options were discussed with patient and /or primary care giver; all questions and concerns were addressed and care was aligned with patient's wishes.    Educated patient on goal oriented pain management treatment options      Source of information: KIAN VALERO, Chart review  Patient language: English  : n/a    HPI:  Patient is 62 year old male from [facility], walks with RW, with PMH of ESRD on HD (TTS), BKA- L w/prosthetic leg, DM, Left eye blindness, CHF, CAD, HTN, HLD, osteoporosis, bronchitis, current smoker, and anemia who presents with complaint of missed dialysis. Last HD 7/22. Pt states he often missed HD sessions.  patient states he missed this HD session due to mild pain in left leg, patient remains able to ambulate. Pt complains of right leg swelling and states he does not make any urine. Patient states he was having mild shortness of breath.  Pt denies any fever, chills, N/V/D, constipation, CP, numbness or tingling (26 Jul 2023 19:20)    Patient is a 62y old  Male who presents with a chief complaint of Missed dialysis (08 Aug 2023 09:49)    Pt is admitted for missed hemodialysis session with hypervolemia and SOB. Pain consulted 8/8 for back pain. Pt seen and examined while sitting in bedside chair, this morning. On assessment patient denies current back pain and reports neck pain 8/10 SCALE USED: (1-10 VNRS). Pt describes neck pain as intermittent and throbbing in nature accompanied by numbness and tingling to bilateral hands. Patient reports that this pain is not new and "I've had it for years." Patient states the pain is exacerbated by movement and reports not getting adequate sleep in the hospital due to interruptions. Patient also endorses chronic right lower extremity numbness/tingling. Pt tolerating PO diet. Denies lethargy, chest pain, SOB, nausea, vomiting, constipation. Reports last BM 8/7. Patient stated goal for pain control: to be able to take deep breaths, get out of bed to chair and ambulate with tolerable pain control. Pt denies taking medications for pain at home and reports wanting to go home today.     PAST MEDICAL & SURGICAL HISTORY:  Hypertension    Adrenal insufficiency  h/o    Anemia    Glaucoma    Coronary artery disease    HLD (hyperlipidemia)    Peripheral vascular disease    Spinal stenosis of lumbosacral region    Hyperparathyroidism    Diabetes mellitus    Diabetic neuropathy    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    Osteoarthritis    Vision loss of left eye  blind    ESRD on hemodialysis  T/Th/S    Cataract  both eyes - hx of sx done    BPH (benign prostatic hyperplasia)    UTI (urinary tract infection)  hx of    Bladder mass  hx of    H/O hematuria    Osteoporosis    Vision loss of right eye  decreased    Depression    Chronic GERD    Osteomyelitis of vertebra    CHF (congestive heart failure)    Below knee amputation status, left  2012- pt is wearing prostesis    History of right cataract extraction    History of left cataract extraction    S/P arteriovenous (AV) fistula creation  right arm brachiocephalic arteriovenous fistula on 11/08/2018    H/O hematuria  s/p bladder bx and fulguration 2/25/2020    H/O transurethral destruction of bladder lesion  2020    History of excision of mass  back mass on 03/31/2021    FAMILY HISTORY:  Family history of cirrhosis of liver (Mother)    Family history of renal failure (Sibling)    Family history of hypertension (Sibling)    Family history of diabetes mellitus (Sibling)    History of substance abuse in sibling (Sibling)    Social History:  from [place], walks with RW (26 Jul 2023 19:20)  [x] Denies ETOH use, illicit drug use and smoking    Allergies    No Known Drug Allergies  fish (Rash)  liver (Anaphylaxis)    MEDICATIONS  (STANDING):  albuterol/ipratropium for Nebulization 3 milliLiter(s) Nebulizer every 6 hours  amLODIPine   Tablet 10 milliGRAM(s) Oral daily  aspirin enteric coated 81 milliGRAM(s) Oral daily  atorvastatin 40 milliGRAM(s) Oral at bedtime  budesonide 160 MICROgram(s)/formoterol 4.5 MICROgram(s) Inhaler 2 Puff(s) Inhalation two times a day  chlorhexidine 2% Cloths 1 Application(s) Topical daily  dextrose 5%. 1000 milliLiter(s) (50 mL/Hr) IV Continuous <Continuous>  dextrose 5%. 1000 milliLiter(s) (100 mL/Hr) IV Continuous <Continuous>  dextrose 50% Injectable 25 Gram(s) IV Push once  dextrose 50% Injectable 12.5 Gram(s) IV Push once  dextrose 50% Injectable 25 Gram(s) IV Push once  epoetin beverley (PROCRIT) Injectable 94332 Unit(s) IV Push <User Schedule>  folic acid 1 milliGRAM(s) Oral daily  furosemide   Injectable 40 milliGRAM(s) IV Push daily  glucagon  Injectable 1 milliGRAM(s) IntraMuscular once  hydrALAZINE Injectable 5 milliGRAM(s) IV Push three times a day  insulin glargine Injectable (LANTUS) 4 Unit(s) SubCutaneous at bedtime  latanoprost 0.005% Ophthalmic Solution 1 Drop(s) Both EYES at bedtime  levothyroxine 25 MICROGram(s) Oral daily  lidocaine   4% Patch 2 Patch Transdermal daily  metoprolol tartrate Injectable 5 milliGRAM(s) IV Push every 12 hours  pantoprazole  Injectable 40 milliGRAM(s) IV Push every 12 hours  sertraline 200 milliGRAM(s) Oral daily  sevelamer carbonate 800 milliGRAM(s) Oral three times a day with meals  sucralfate 1 Gram(s) Oral four times a day    MEDICATIONS  (PRN):  acetaminophen     Tablet .. 975 milliGRAM(s) Oral every 8 hours PRN Moderate Pain (4 - 6)  dextrose Oral Gel 15 Gram(s) Oral once PRN Blood Glucose LESS THAN 70 milliGRAM(s)/deciliter  haloperidol    Injectable 0.5 milliGRAM(s) IntraMuscular every 8 hours PRN Agitation  LORazepam   Injectable 0.5 milliGRAM(s) IV Push every 4 hours PRN agitation and nausea  ondansetron   Disintegrating Tablet 4 milliGRAM(s) Oral two times a day PRN Nausea and/or Vomiting  ondansetron Injectable 4 milliGRAM(s) IV Push every 6 hours PRN Nausea and/or Vomiting    Vital Signs Last 24 Hrs  T(C): 37.1 (08 Aug 2023 05:04), Max: 37.1 (08 Aug 2023 05:04)  T(F): 98.8 (08 Aug 2023 05:04), Max: 98.8 (08 Aug 2023 05:04)  HR: 91 (08 Aug 2023 05:04) (73 - 91)  BP: 158/77 (08 Aug 2023 05:04) (147/80 - 172/92)  BP(mean): --  RR: 18 (08 Aug 2023 05:04) (16 - 18)  SpO2: 92% (08 Aug 2023 05:04) (92% - 97%)    Parameters below as of 08 Aug 2023 05:04  Patient On (Oxygen Delivery Method): room air    LABS: Reviewed.                      7.1    2.90  )-----------( 83       ( 08 Aug 2023 07:05 )             23.0     08-08    140  |  102  |  83<H>  ----------------------------<  99  4.1   |  21<L>  |  17.40<H>    Ca    7.5<L>      08 Aug 2023 07:05  Phos  7.2     08-08  Mg     2.8     08-08    Urinalysis Basic - ( 08 Aug 2023 07:05 )    Color: x / Appearance: x / SG: x / pH: x  Gluc: 99 mg/dL / Ketone: x  / Bili: x / Urobili: x   Blood: x / Protein: x / Nitrite: x   Leuk Esterase: x / RBC: x / WBC x   Sq Epi: x / Non Sq Epi: x / Bacteria: x    CAPILLARY BLOOD GLUCOSE    POCT Blood Glucose.: 135 mg/dL (08 Aug 2023 08:32)  POCT Blood Glucose.: 81 mg/dL (07 Aug 2023 11:40)    COVID-19 PCR: NotDetec (11 Jul 2023 10:38)  COVID-19 PCR: NotDetec (30 May 2023 10:31)  COVID-19 PCR: NotDetec (22 Mar 2023 09:25)  SARS-CoV-2: NotDetec (15 Mar 2023 12:54)    Radiology: Reviewed.   < from: NM Hepatobiliary Imaging w/ RX (07.31.23 @ 10:44) >    ACC: [id number] EXAM:  NM HEPATOBILIARY IMG W RX   ORDERED BY: SHAKIR MAC     PROCEDURE DATE:  07/31/2023        INTERPRETATION:  CLINICAL INFORMATION: 62-year-old man with RIGHT UPPER   quadrant pain referred for evaluation of acute cholecystitis.    DURATION of DYNAMIC SERIES: 45 minutes  RADIOPHARMACEUTICAL: 3.2 mCi Tc-99m-Mebrofenin, I.V.    TECHNIQUE: Dynamic imaging of the anterior abdomen was performed   following radiopharmaceutical injection. Patient declined static images.    COMPARISON: None    OTHER STUDIES USED FOR CORRELATION: CT abdomen/pelvis 7/30/2023    FINDINGS: There is prompt, homogeneous uptake of radiopharmaceutical by   the hepatocytes. Activity is seen in the gallbladder at approximately 15   minutes. Patient declined further imaging before bowel was visualized.   There is fair clearance of activity from the liver by the end of the   obtained images..    IMPRESSION: Patient declined to complete the full exam and bowel was not   visualized on the available images. Otherwise unremarkable exam with no   evidence of acute cholecystitis or biliary obstruction.    --- End of Report ---    JD TUCKER MD; Attending Radiologist  This document has been electronically signed. Jul 31 2023 10:59AM    < end of copied text >    < from: CT Abdomen and Pelvis No Cont (07.30.23 @ 13:35) >    ACC: [id number] EXAM:  CT ABDOMEN AND PELVIS   ORDERED BY: ALEXANDER MATIAS     PROCEDURE DATE:  07/30/2023        INTERPRETATION:  CLINICAL INFORMATION: Worsening nausea and vomiting with   dark colored emesis.    COMPARISON: 7/26/2023    CONTRAST/COMPLICATIONS:  IV Contrast: NONE  Oral Contrast: NONE  Complications: None reported at time of study completion    PROCEDURE:  CT of the Abdomen and Pelvis was performed.  Sagittal and coronal reformats were performed.    FINDINGS: The examination is limited by lack of IV contrast.  LOWER CHEST: Small bilateral pleural effusions. Small bilateral   atelectasis. Nonspecific groundglass densities in the right lower lung   zone; clinical correlation with pneumonia is suggested. Cardiomegaly and   mild pericardial effusion, unchanged.    LIVER: Within normal limits.  BILE DUCTS: Dilated common bile duct again noted.  GALLBLADDER: Small gallstones. Nonspecific trace pericholecystic fluid.  SPLEEN: Within normal limits.  PANCREAS: Dilated main pancreaticduct is again noted.  ADRENALS: The right adrenal appears unremarkable. Nonspecific left   adrenal thickening.  KIDNEYS/URETERS: Within normal limits except for a few small hypodense   lesions in the kidneys bilaterally, representing probable cysts.    BLADDER: Underdistended.  REPRODUCTIVE ORGANS: The prostate and seminal vesicles appear grossly   unremarkable.    BOWEL: No bowel obstruction. Appendix unremarkable. Nonspecific mild   distal esophageal mural thickening.  PERITONEUM: Small ascites. No free air.  VESSELS: Calcified atherosclerotic disease.  RETROPERITONEUM/LYMPH NODES: No lymphadenopathy.  ABDOMINAL WALL: Anasarca again noted.  BONES: No acute CT findings.    IMPRESSION: Small gallstones. Nonspecific trace pericholecystic fluid..   If there is a clinical suspicion for acute cholecystitis, gallbladder   ultrasound/HIDA scan may be pursued for further evaluation.    Stable dilated common bile duct and main pancreatic duct. Please see   previous MRCP dated 2/23/2023.    No bowel obstruction. Appendix unremarkable. Nonspecific mild distal   esophageal mural thickening.    Small ascites.    Anasarca.    Small bilateral pleural effusions. Small bilateral atelectasis.   Nonspecific groundglass densities in the right lower lungzone; clinical   correlation with pneumonia is suggested.    Cardiomegaly and mild pericardial effusion, unchanged.    --- End of Report ---    ROSALBA GREEN MD; Attending Radiologist  This document has been electronically signed. Jul 30 2023  2:23PM    < end of copied text >    < from: Xray Chest 1 View-PORTABLE IMMEDIATE (Xray Chest 1 View-PORTABLE IMMEDIATE .) (07.30.23 @ 09:28) >    ACC: [id number] EXAM:  XR CHEST PORTABLE IMMED 1V   ORDERED BY: JULIETA MUJICA     PROCEDURE DATE:  07/30/2023        INTERPRETATION:  AP semisupine chest on July 30, 2023 at 9:14 AM. 2   images. Patient is short of breath.    Gross heart enlargement again noted. There is increasing perihilar   infiltrate now mild to moderate compared to July 26.    IMPRESSION: Increasing CHF. Gross heart enlargement again noted.    --- End of Report ---    ARASH REYNOSO MD; Attending Radiologist  This document has been electronically signed. Jul 30 2023  4:03PM    < end of copied text >    ORT Score -   Family Hx of substance abuse	Female	      Male  Alcohol 	                                           1                     3  Illegal drugs	                                   2                     3  Rx drugs                                           4 	                  4  Personal Hx of substance abuse		  Alcohol 	                                          3	                  3  Illegal drugs                                     4	                  4  Rx drugs                                            5 	                  5  Age between 16- 45 years	           1                     1  hx preadolescent sexual abuse	   3 	                  0  Psychological disease		  ADD, OCD, bipolar, schizophrenia   2	          2  Depression                                           1 	          1  Total: 0    a score of 3 or lower indicates low risk for opioid abuse		  a score of 4-7 indicates moderate risk for opioid abuse		  a score of 8 or higher indicates high risk for opioid abuse  	  REVIEW OF SYSTEMS:  CONSTITUTIONAL: No fever or fatigue  HEENT:  No difficulty hearing, no change in vision  NECK: + neck pain accompanied by numbness and tingling to bilateral hands (chronic)  RESPIRATORY: No cough, wheezing, chills or hemoptysis; No shortness of breath  CARDIOVASCULAR: No chest pain, palpitations, dizziness, or leg swelling  GASTROINTESTINAL: No loss of appetite, decreased PO intake. No abdominal or epigastric pain. No nausea, vomiting; No diarrhea or constipation.   GENITOURINARY: Anuric  MUSCULOSKELETAL: No joint pain or swelling; + intermittent back pain, + Left AKA, no right lower extremity motor strength weakness, no saddle anesthesia, bowel/bladder incontinence, no falls   NEURO: No headaches, Bilateral hand and right lower extremity numbness tingling (chronic)  PSYCHIATRIC: History of anxiety, reports difficulty sleeping while in hospital    PHYSICAL EXAM:  GENERAL: Alert & Oriented X4, mild agitation, NAD. Speech is clear.   RESPIRATORY: Respirations even and unlabored. Clear to auscultation bilaterally; No rales, rhonchi, wheezing, or rubs  CARDIOVASCULAR: Normal S1/S2, regular rate and rhythm; No murmurs, rubs, or gallops. No JVD.   GASTROINTESTINAL:  Soft, Nontender, Nondistended; Bowel sounds present  PERIPHERAL VASCULAR:  Extremities warm without edema. R AVF +bruit/thrill. unable to palpate right lower extremity pulses, No cyanosis, No calf tenderness, +L AKA  MUSCULOSKELETAL: Motor Strength 4/5 B/L upper and right lower extremities; ROM intact; No tenderness on palpation of all joints.   SKIN: Warm, dry, right lower extremity peripheral vascular disease    Risk factors associated with adverse outcomes related to opioid treatment  [ ] Concurrent benzodiazepine use  [ ] History/ Active substance use or alcohol use disorder  [X ] Psychiatric co-morbidity  [ ] Sleep apnea  [ ] COPD  [ ] BMI> 35  [ ] Liver dysfunction  [x] Renal dysfunction  [ ] CHF  [ ] Smoker  [x] Age > 60 years    [x]  [place]  Reviewed and Copied to Chart. See below.    Plan of care and goal oriented pain management treatment options were discussed with patient and /or primary care giver; all questions and concerns were addressed and care was aligned with patient's wishes.    Educated patient on goal oriented pain management treatment options

## 2023-08-08 NOTE — CONSULT NOTE ADULT - CONSULT REASON
n/v
CHF, Positive trop
Coffee-ground emesis
goals of care, complex medical decision making
ESRD
suspected aspiration Pneumonia
Back Pain
SOB / Hypoxia

## 2023-08-08 NOTE — CONSULT NOTE ADULT - PROVIDER SPECIALTY LIST ADULT
Surgery
Pulmonology
Gastroenterology
Cardiology
Infectious Disease
Pain Medicine
Nephrology
Palliative Care

## 2023-08-08 NOTE — PROGRESS NOTE ADULT - SUBJECTIVE AND OBJECTIVE BOX
[facility] : Progress Note :: [phone], (office [phone]),   Dr Mosher / Dr Washington / Dr Young / Dr Doll / Dr Varsha TURCIOS / Dr Tello / Dr Garcia / Dr Larry qiu  _____________________________________________________________________________________________  patient only agreed to 8 hrs of HD.  he did not provide a lesson for refusing HD     No Known Drug Allergies  fish (Rash)  liver (Anaphylaxis)    Hospital Medications:   MEDICATIONS  (STANDING):  albuterol/ipratropium for Nebulization 3 milliLiter(s) Nebulizer every 6 hours  amLODIPine   Tablet 10 milliGRAM(s) Oral daily  aspirin enteric coated 81 milliGRAM(s) Oral daily  atorvastatin 40 milliGRAM(s) Oral at bedtime  budesonide 160 MICROgram(s)/formoterol 4.5 MICROgram(s) Inhaler 2 Puff(s) Inhalation two times a day  chlorhexidine 2% Cloths 1 Application(s) Topical daily  dextrose 5%. 1000 milliLiter(s) (50 mL/Hr) IV Continuous <Continuous>  dextrose 5%. 1000 milliLiter(s) (100 mL/Hr) IV Continuous <Continuous>  dextrose 50% Injectable 25 Gram(s) IV Push once  dextrose 50% Injectable 12.5 Gram(s) IV Push once  dextrose 50% Injectable 25 Gram(s) IV Push once  epoetin beverley (PROCRIT) Injectable 40252 Unit(s) IV Push <User Schedule>  folic acid 1 milliGRAM(s) Oral daily  furosemide   Injectable 40 milliGRAM(s) IV Push daily  glucagon  Injectable 1 milliGRAM(s) IntraMuscular once  hydrALAZINE Injectable 5 milliGRAM(s) IV Push three times a day  insulin glargine Injectable (LANTUS) 4 Unit(s) SubCutaneous at bedtime  latanoprost 0.005% Ophthalmic Solution 1 Drop(s) Both EYES at bedtime  levothyroxine 25 MICROGram(s) Oral daily  lidocaine   4% Patch 2 Patch Transdermal daily  metoprolol tartrate Injectable 5 milliGRAM(s) IV Push every 12 hours  pantoprazole  Injectable 40 milliGRAM(s) IV Push every 12 hours  sertraline 200 milliGRAM(s) Oral daily  sevelamer carbonate 800 milliGRAM(s) Oral three times a day with meals  sucralfate 1 Gram(s) Oral four times a day        VITALS:  T(F): 97.8 (08-08-23 @ 17:26), Max: 98.8 (08-08-23 @ 05:04)  HR: 91 (08-08-23 @ 17:26)  BP: 187/101 (08-08-23 @ 17:26)  RR: 17 (08-08-23 @ 17:26)  SpO2: 97% (08-08-23 @ 17:26)  Wt(kg): --      PHYSICAL EXAM:  Constitutional: NAD  HEENT: anicteric sclera, oropharynx clear,  Neck: No JVD  Respiratory: CTAB, no wheezes, rales or rhonchi  Cardiovascular: S1, S2, RRR  Gastrointestinal: BS+, soft, NT/ND  Extremities: peripheral edema+  Neurological: A/O x 3,   : No CVA tenderness. No [place].   Skin: No rashes  Vascular Access: AVF with thrill and bruit    LABS:  08-08    140  |  102  |  83<H>  ----------------------------<  99  4.1   |  21<L>  |  17.40<H>    Ca    7.5<L>      08 Aug 2023 07:05  Phos  7.2     08-08  Mg     2.8     08-08      Creatinine Trend: 17.40 <--, 16.90 <--, 15.40 <--                        7.1    2.90  )-----------( 83       ( 08 Aug 2023 07:05 )             23.0     Urine Studies:  Urinalysis Basic - ( 08 Aug 2023 07:05 )    Color:  / Appearance:  / SG:  / pH:   Gluc: 99 mg/dL / Ketone:   / Bili:  / Urobili:    Blood:  / Protein:  / Nitrite:    Leuk Esterase:  / RBC:  / WBC    Sq Epi:  / Non Sq Epi:  / Bacteria:         RADIOLOGY & ADDITIONAL STUDIES:

## 2023-08-08 NOTE — PROGRESS NOTE ADULT - ASSESSMENT
# ESRD.  grossly fluid overloaded. Patient only   only did 8 minutes on HD today   F/U on goals of care versus discharge planning  D/W primary attensing and palliative care   unclear if will agree for HD tommorrow  he has very poor prognosis in light of his non-compliance   # anemia of CKD. epogen on HD. transfuse for HBG <7

## 2023-08-08 NOTE — CONSULT NOTE ADULT - REASON FOR ADMISSION
Missed dialysis

## 2023-08-09 NOTE — PROGRESS NOTE ADULT - SUBJECTIVE AND OBJECTIVE BOX
Source of information: KIAN VALERO, Chart review  Patient language: English  : n/a    HPI:  Patient is 62 year old male from [facility], walks with RW, with PMH of ESRD on HD (TTS), BKA- L w/prosthetic leg, DM, Left eye blindness, CHF, CAD, HTN, HLD, osteoporosis, bronchitis, current smoker, and anemia who presents with complaint of missed dialysis. Last HD 7/22. Pt states he often missed HD sessions.  patient states he missed this HD session due to mild pain in left leg, patient remains able to ambulate. Pt complains of right leg swelling and states he does not make any urine. Patient states he was having mild shortness of breath.  Pt denies any fever, chills, N/V/D, constipation, CP, numbness or tingling (26 Jul 2023 19:20)    Patient is a 62y old  Male who presents with a chief complaint of Missed dialysis (08 Aug 2023 09:49)    Pt is admitted for missed hemodialysis session with hypervolemia and SOB. Pain consulted 8/8 for back pain. Pt seen and examined while sitting in bedside chair, this morning. Today patient reports back pain and neck pain 7/10 SCALE USED: (1-10 VNRS). Pt describes neck pain as intermittent and throbbing in nature accompanied by numbness and tingling to bilateral hands. Patient reports that this pain is not new and "I've had it for years." Patient reports back pain as intermittent and throbbing in quality. Patient states the pain is exacerbated by movement and reports not getting adequate sleep in the hospital due to interruptions. Patient also endorses chronic right lower extremity numbness/tingling. Pt tolerating PO diet. Denies lethargy, chest pain, SOB, nausea, vomiting, constipation. Reports last BM 8/7. Patient stated goal for pain control: to be able to take deep breaths, get out of bed to chair and ambulate with tolerable pain control. Pt denies taking medications for pain at home.    PAST MEDICAL & SURGICAL HISTORY:  Hypertension    Adrenal insufficiency  h/o    Anemia    Glaucoma    Coronary artery disease    HLD (hyperlipidemia)    Peripheral vascular disease    Spinal stenosis of lumbosacral region    Hyperparathyroidism    Diabetes mellitus    Diabetic neuropathy    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    Osteoarthritis    Vision loss of left eye  blind    ESRD on hemodialysis  T/Th/S    Cataract  both eyes - hx of sx done    BPH (benign prostatic hyperplasia)    UTI (urinary tract infection)  hx of    Bladder mass  hx of    H/O hematuria    Osteoporosis    Vision loss of right eye  decreased    Depression    Chronic GERD    Osteomyelitis of vertebra    CHF (congestive heart failure)    Below knee amputation status, left  2012- pt is wearing prostesis    History of right cataract extraction    History of left cataract extraction    S/P arteriovenous (AV) fistula creation  right arm brachiocephalic arteriovenous fistula on 11/08/2018    H/O hematuria  s/p bladder bx and fulguration 2/25/2020    H/O transurethral destruction of bladder lesion  2020    History of excision of mass  back mass on 03/31/2021    FAMILY HISTORY:  Family history of cirrhosis of liver (Mother)    Family history of renal failure (Sibling)    Family history of hypertension (Sibling)    Family history of diabetes mellitus (Sibling)    History of substance abuse in sibling (Sibling)    Social History:  from [facility], walks with RW (26 Jul 2023 19:20)  [x] Denies ETOH use, illicit drug use and smoking    Allergies    No Known Drug Allergies  fish (Rash)  liver (Anaphylaxis)        MEDICATIONS  (STANDING):  albuterol/ipratropium for Nebulization 3 milliLiter(s) Nebulizer every 6 hours  amLODIPine   Tablet 10 milliGRAM(s) Oral daily  aspirin enteric coated 81 milliGRAM(s) Oral daily  atorvastatin 40 milliGRAM(s) Oral at bedtime  budesonide 160 MICROgram(s)/formoterol 4.5 MICROgram(s) Inhaler 2 Puff(s) Inhalation two times a day  chlorhexidine 2% Cloths 1 Application(s) Topical daily  dextrose 5%. 1000 milliLiter(s) (50 mL/Hr) IV Continuous <Continuous>  dextrose 5%. 1000 milliLiter(s) (100 mL/Hr) IV Continuous <Continuous>  dextrose 50% Injectable 25 Gram(s) IV Push once  dextrose 50% Injectable 25 Gram(s) IV Push once  dextrose 50% Injectable 12.5 Gram(s) IV Push once  epoetin beverley (PROCRIT) Injectable 46435 Unit(s) IV Push <User Schedule>  folic acid 1 milliGRAM(s) Oral daily  furosemide   Injectable 40 milliGRAM(s) IV Push daily  glucagon  Injectable 1 milliGRAM(s) IntraMuscular once  hydrALAZINE Injectable 5 milliGRAM(s) IV Push three times a day  insulin glargine Injectable (LANTUS) 4 Unit(s) SubCutaneous at bedtime  latanoprost 0.005% Ophthalmic Solution 1 Drop(s) Both EYES at bedtime  levothyroxine 25 MICROGram(s) Oral daily  lidocaine   4% Patch 2 Patch Transdermal daily  metoprolol tartrate Injectable 5 milliGRAM(s) IV Push every 12 hours  pantoprazole  Injectable 40 milliGRAM(s) IV Push every 12 hours  sertraline 200 milliGRAM(s) Oral daily  sevelamer carbonate 800 milliGRAM(s) Oral three times a day with meals  sucralfate 1 Gram(s) Oral four times a day    MEDICATIONS  (PRN):  acetaminophen     Tablet .. 975 milliGRAM(s) Oral every 8 hours PRN Moderate Pain (4 - 6)  dextrose Oral Gel 15 Gram(s) Oral once PRN Blood Glucose LESS THAN 70 milliGRAM(s)/deciliter  LORazepam   Injectable 0.5 milliGRAM(s) IV Push every 4 hours PRN agitation and nausea  LORazepam   Injectable 0.5 milliGRAM(s) IV Push once PRN Dialysis  ondansetron   Disintegrating Tablet 4 milliGRAM(s) Oral two times a day PRN Nausea and/or Vomiting  ondansetron Injectable 4 milliGRAM(s) IV Push every 6 hours PRN Nausea and/or Vomiting      Vital Signs Last 24 Hrs  T(C): 36.7 (09 Aug 2023 11:53), Max: 37.3 (09 Aug 2023 05:00)  T(F): 98.1 (09 Aug 2023 11:53), Max: 99.1 (09 Aug 2023 05:00)  HR: 88 (09 Aug 2023 11:53) (88 - 100)  BP: 132/66 (09 Aug 2023 11:53) (132/66 - 187/101)  BP(mean): --  RR: 18 (09 Aug 2023 11:53) (17 - 18)  SpO2: 95% (09 Aug 2023 11:53) (93% - 98%)    Parameters below as of 09 Aug 2023 11:53  Patient On (Oxygen Delivery Method): room air      COVID-19 PCR: NotDetec (11 Jul 2023 10:38)  COVID-19 PCR: NotDetec (30 May 2023 10:31)  COVID-19 PCR: NotDetec (22 Mar 2023 09:25)  SARS-CoV-2: NotDetec (15 Mar 2023 12:54)    LABS: Reviewed                          7.6    3.33  )-----------( 83       ( 09 Aug 2023 05:50 )             24.5     08-09    139  |  98  |  82<H>  ----------------------------<  123<H>  4.2   |  23  |  17.50<H>    Ca    7.9<L>      09 Aug 2023 05:50  Phos  7.2     08-08  Mg     2.8     08-08      Urinalysis Basic - ( 09 Aug 2023 05:50 )    Color: x / Appearance: x / SG: x / pH: x  Gluc: 123 mg/dL / Ketone: x  / Bili: x / Urobili: x   Blood: x / Protein: x / Nitrite: x   Leuk Esterase: x / RBC: x / WBC x   Sq Epi: x / Non Sq Epi: x / Bacteria: x    CAPILLARY BLOOD GLUCOSE    POCT Blood Glucose.: 143 mg/dL (09 Aug 2023 11:23)  POCT Blood Glucose.: 164 mg/dL (08 Aug 2023 21:30)  POCT Blood Glucose.: 161 mg/dL (08 Aug 2023 16:57)  POCT Blood Glucose.: 138 mg/dL (08 Aug 2023 12:25)    COVID-19 PCR: NotDetec (11 Jul 2023 10:38)  COVID-19 PCR: NotDetec (30 May 2023 10:31)  COVID-19 PCR: NotDetec (22 Mar 2023 09:25)  SARS-CoV-2: NotDetec (15 Mar 2023 12:54)    Radiology: Reviewed.   < from: NM Hepatobiliary Imaging w/ RX (07.31.23 @ 10:44) >    ACC: [id number] EXAM:  NM HEPATOBILIARY IMG W RX   ORDERED BY: SHAKIR MAC     PROCEDURE DATE:  07/31/2023        INTERPRETATION:  CLINICAL INFORMATION: 62-year-old man with RIGHT UPPER   quadrant pain referred for evaluation of acute cholecystitis.    DURATION of DYNAMIC SERIES: 45 minutes  RADIOPHARMACEUTICAL: 3.2 mCi Tc-99m-Mebrofenin, I.V.    TECHNIQUE: Dynamic imaging of the anterior abdomen was performed   following radiopharmaceutical injection. Patient declined static images.    COMPARISON: None    OTHER STUDIES USED FOR CORRELATION: CT abdomen/pelvis 7/30/2023    FINDINGS: There is prompt, homogeneous uptake of radiopharmaceutical by   the hepatocytes. Activity is seen in the gallbladder at approximately 15   minutes. Patient declined further imaging before bowel was visualized.   There is fair clearance of activity from the liver by the end of the   obtained images..    IMPRESSION: Patient declined to complete the full exam and bowel was not   visualized on the available images. Otherwise unremarkable exam with no   evidence of acute cholecystitis or biliary obstruction.    --- End of Report ---    JD TUCKER MD; Attending Radiologist  This document has been electronically signed. Jul 31 2023 10:59AM    < end of copied text >    < from: CT Abdomen and Pelvis No Cont (07.30.23 @ 13:35) >    ACC: [id number] EXAM:  CT ABDOMEN AND PELVIS   ORDERED BY: ALEXANDER MATIAS     PROCEDURE DATE:  07/30/2023        INTERPRETATION:  CLINICAL INFORMATION: Worsening nausea and vomiting with   dark colored emesis.    COMPARISON: 7/26/2023    CONTRAST/COMPLICATIONS:  IV Contrast: NONE  Oral Contrast: NONE  Complications: None reported at time of study completion    PROCEDURE:  CT of the Abdomen and Pelvis was performed.  Sagittal and coronal reformats were performed.    FINDINGS: The examination is limited by lack of IV contrast.  LOWER CHEST: Small bilateral pleural effusions. Small bilateral   atelectasis. Nonspecific groundglass densities in the right lower lung   zone; clinical correlation with pneumonia is suggested. Cardiomegaly and   mild pericardial effusion, unchanged.    LIVER: Within normal limits.  BILE DUCTS: Dilated common bile duct again noted.  GALLBLADDER: Small gallstones. Nonspecific trace pericholecystic fluid.  SPLEEN: Within normal limits.  PANCREAS: Dilated main pancreaticduct is again noted.  ADRENALS: The right adrenal appears unremarkable. Nonspecific left   adrenal thickening.  KIDNEYS/URETERS: Within normal limits except for a few small hypodense   lesions in the kidneys bilaterally, representing probable cysts.    BLADDER: Underdistended.  REPRODUCTIVE ORGANS: The prostate and seminal vesicles appear grossly   unremarkable.    BOWEL: No bowel obstruction. Appendix unremarkable. Nonspecific mild   distal esophageal mural thickening.  PERITONEUM: Small ascites. No free air.  VESSELS: Calcified atherosclerotic disease.  RETROPERITONEUM/LYMPH NODES: No lymphadenopathy.  ABDOMINAL WALL: Anasarca again noted.  BONES: No acute CT findings.    IMPRESSION: Small gallstones. Nonspecific trace pericholecystic fluid..   If there is a clinical suspicion for acute cholecystitis, gallbladder   ultrasound/HIDA scan may be pursued for further evaluation.    Stable dilated common bile duct and main pancreatic duct. Please see   previous MRCP dated 2/23/2023.    No bowel obstruction. Appendix unremarkable. Nonspecific mild distal   esophageal mural thickening.    Small ascites.    Anasarca.    Small bilateral pleural effusions. Small bilateral atelectasis.   Nonspecific groundglass densities in the right lower lungzone; clinical   correlation with pneumonia is suggested.    Cardiomegaly and mild pericardial effusion, unchanged.    --- End of Report ---    ROSALBA GREEN MD; Attending Radiologist  This document has been electronically signed. Jul 30 2023  2:23PM    < end of copied text >    < from: Xray Chest 1 View-PORTABLE IMMEDIATE (Xray Chest 1 View-PORTABLE IMMEDIATE .) (07.30.23 @ 09:28) >    ACC: [id number] EXAM:  XR CHEST PORTABLE IMMED 1V   ORDERED BY: JULIETA MUJICA     PROCEDURE DATE:  07/30/2023        INTERPRETATION:  AP semisupine chest on July 30, 2023 at 9:14 AM. 2   images. Patient is short of breath.    Gross heart enlargement again noted. There is increasing perihilar   infiltrate now mild to moderate compared to July 26.    IMPRESSION: Increasing CHF. Gross heart enlargement again noted.    --- End of Report ---    ARASH REYNOSO MD; Attending Radiologist  This document has been electronically signed. Jul 30 2023  4:03PM    < end of copied text >    ORT Score -   Family Hx of substance abuse	Female	      Male  Alcohol 	                                           1                     3  Illegal drugs	                                   2                     3  Rx drugs                                           4 	                  4  Personal Hx of substance abuse		  Alcohol 	                                          3	                  3  Illegal drugs                                     4	                  4  Rx drugs                                            5 	                  5  Age between 16- 45 years	           1                     1  hx preadolescent sexual abuse	   3 	                  0  Psychological disease		  ADD, OCD, bipolar, schizophrenia   2	          2  Depression                                           1 	          1  Total: 0    a score of 3 or lower indicates low risk for opioid abuse		  a score of 4-7 indicates moderate risk for opioid abuse		  a score of 8 or higher indicates high risk for opioid abuse  	  REVIEW OF SYSTEMS:  CONSTITUTIONAL: No fever or fatigue  HEENT:  No difficulty hearing, no change in vision  NECK: + neck pain accompanied by numbness and tingling to bilateral hands (chronic)  RESPIRATORY: No cough, wheezing, chills or hemoptysis; No shortness of breath  CARDIOVASCULAR: No chest pain, palpitations, dizziness, or leg swelling  GASTROINTESTINAL: No loss of appetite, decreased PO intake. No abdominal or epigastric pain. No nausea, vomiting; No diarrhea or constipation.   GENITOURINARY: Anuric  MUSCULOSKELETAL: No joint pain or swelling; + intermittent back pain, + Left AKA, no right lower extremity motor strength weakness, no saddle anesthesia, bowel/bladder incontinence, no falls   NEURO: No headaches, Bilateral hand and right lower extremity numbness tingling (chronic)  PSYCHIATRIC: History of anxiety, reports difficulty sleeping while in hospital    PHYSICAL EXAM:  GENERAL: Alert & Oriented X 3, needs reorientation to time, mild agitation, NAD. Speech is clear.   RESPIRATORY: Respirations even and unlabored. Clear to auscultation bilaterally; No rales, rhonchi, wheezing, or rubs  CARDIOVASCULAR: Normal S1/S2, regular rate and rhythm; No murmurs, rubs, or gallops. No JVD.   GASTROINTESTINAL:  Soft, Nontender, Nondistended; Bowel sounds present  PERIPHERAL VASCULAR:  Extremities warm without edema. R AVF +bruit/thrill. unable to palpate right lower extremity pulses, No cyanosis, No calf tenderness, +L AKA  MUSCULOSKELETAL: Motor Strength 4/5 B/L upper and right lower extremities; ROM intact; No tenderness on palpation of all joints.   SKIN: Warm, dry, right lower extremity peripheral vascular disease    Risk factors associated with adverse outcomes related to opioid treatment  [ ] Concurrent benzodiazepine use  [ ] History/ Active substance use or alcohol use disorder  [X ] Psychiatric co-morbidity  [ ] Sleep apnea  [ ] COPD  [ ] BMI> 35  [ ] Liver dysfunction  [x] Renal dysfunction  [ ] CHF  [ ] Smoker  [x] Age > 60 years    [x]  [place]  Reviewed and Copied to Chart. See below.    Plan of care and goal oriented pain management treatment options were discussed with patient and /or primary care giver; all questions and concerns were addressed and care was aligned with patient's wishes.    Educated patient on goal oriented pain management treatment options       08-09-23 @ 12:03

## 2023-08-09 NOTE — PROGRESS NOTE ADULT - SUBJECTIVE AND OBJECTIVE BOX
DATE OF SERVICE: 08-09-23    Patient denies chest pain or shortness of breath.   Review of symptoms otherwise negative.    acetaminophen     Tablet .. 975 milliGRAM(s) Oral every 8 hours PRN  albuterol/ipratropium for Nebulization 3 milliLiter(s) Nebulizer every 6 hours  amLODIPine   Tablet 10 milliGRAM(s) Oral daily  aspirin enteric coated 81 milliGRAM(s) Oral daily  atorvastatin 40 milliGRAM(s) Oral at bedtime  budesonide 160 MICROgram(s)/formoterol 4.5 MICROgram(s) Inhaler 2 Puff(s) Inhalation two times a day  chlorhexidine 2% Cloths 1 Application(s) Topical daily  dextrose 5%. 1000 milliLiter(s) IV Continuous <Continuous>  dextrose 5%. 1000 milliLiter(s) IV Continuous <Continuous>  dextrose 50% Injectable 25 Gram(s) IV Push once  dextrose 50% Injectable 25 Gram(s) IV Push once  dextrose 50% Injectable 12.5 Gram(s) IV Push once  dextrose Oral Gel 15 Gram(s) Oral once PRN  epoetin beverley (PROCRIT) Injectable 21851 Unit(s) IV Push <User Schedule>  folic acid 1 milliGRAM(s) Oral daily  furosemide   Injectable 40 milliGRAM(s) IV Push daily  glucagon  Injectable 1 milliGRAM(s) IntraMuscular once  haloperidol    Injectable 0.5 milliGRAM(s) IntraMuscular every 8 hours PRN  hydrALAZINE Injectable 5 milliGRAM(s) IV Push three times a day  insulin glargine Injectable (LANTUS) 4 Unit(s) SubCutaneous at bedtime  latanoprost 0.005% Ophthalmic Solution 1 Drop(s) Both EYES at bedtime  levothyroxine 25 MICROGram(s) Oral daily  lidocaine   4% Patch 2 Patch Transdermal daily  LORazepam   Injectable 0.5 milliGRAM(s) IV Push every 4 hours PRN  metoprolol tartrate Injectable 5 milliGRAM(s) IV Push every 12 hours  ondansetron   Disintegrating Tablet 4 milliGRAM(s) Oral two times a day PRN  ondansetron Injectable 4 milliGRAM(s) IV Push every 6 hours PRN  pantoprazole  Injectable 40 milliGRAM(s) IV Push every 12 hours  sertraline 200 milliGRAM(s) Oral daily  sevelamer carbonate 800 milliGRAM(s) Oral three times a day with meals  sucralfate 1 Gram(s) Oral four times a day                            7.6    3.33  )-----------( 83       ( 09 Aug 2023 05:50 )             24.5       Hemoglobin: 7.6 g/dL (08-09 @ 05:50)  Hemoglobin: 7.1 g/dL (08-08 @ 07:05)  Hemoglobin: 7.2 g/dL (08-05 @ 12:21)      08-09    139  |  98  |  82<H>  ----------------------------<  123<H>  4.2   |  23  |  17.50<H>    Ca    7.9<L>      09 Aug 2023 05:50  Phos  7.2     08-08  Mg     2.8     08-08      Creatinine Trend: 17.50<--, 17.40<--, 16.90<--, 15.40<--, 16.10<--, 13.80<--    COAGS:           T(C): 37.3 (08-09-23 @ 05:00), Max: 37.3 (08-09-23 @ 05:00)  HR: 88 (08-09-23 @ 06:35) (88 - 100)  BP: 158/63 (08-09-23 @ 06:35) (158/63 - 187/101)  RR: 18 (08-09-23 @ 05:00) (17 - 18)  SpO2: 94% (08-09-23 @ 06:35) (93% - 98%)  Wt(kg): --    I&O's Summary      HEENT:  (-)icterus (-)pallor  CV: N S1 S2 1/6 DIANA (+)2 Pulses B/l  Resp:  Clear to ausculatation B/L, normal effort  GI: (+) BS Soft, NT, ND  Lymph:  (-)Edema, (-)obvious lymphadenopathy  Skin: Warm to touch, Normal turgor  Psych: Appropriate mood and affect         ASSESSMENT/PLAN: 	62y Male  Mateus Murray, walks with RW, with PMH of ESRD on HD (TTS), BKA- L w/prosthetic leg, DM, Left eye blindness, CHF,, HTN, HLD, EF 45-50%, normal perfusion 4/23 osteoporosis, bronchitis, current smoker, and anemia who presents with complaint of missed dialysis.     # CHF  - Due to missed HD  - HD per renal    # Positive trop  - nothing to suggest ACS.  His trop has been higher in the past and demonstrated normal perfusion on Stress 4/23  - ECHO noted, normal LV fx severe pulm HTN, cont to keep net negative     # Noncompliance  - Behavioral health f/u  - refusing meds and blood tests as well as HD at times     # Smoking  - Cessation stressed    # HTN  - Suspect BP will improve once euvolemic         Dominic Still MD, [facility]  [facility]PER [phone]

## 2023-08-09 NOTE — PROGRESS NOTE ADULT - PROBLEM SELECTOR PLAN 12
- Continue to hold heparin due to ongoing anemia and concern for possible gastric ulcer  - C/w Protonix for GI ppx

## 2023-08-09 NOTE — PROGRESS NOTE ADULT - PROBLEM SELECTOR PLAN 1
Pt with intermittent neck and back pain which is somatic and neuropathic in nature due to history of lumbosacral spinal stenosis, osteoarthritis, and diabetic peripheral neuropathy. + hx left AKA.   Optimize Non-opioid pain recommendations   - Continue Acetaminophen 975 mg PO q 8 hours PRN for moderate pain  - Patient with eGFR of 3* current Cr. 17.40 and history of missed HD sessions. Gabapentin not recommended at this time.  - Continue Lidoderm 4% patch daily.   Mild pain (score 1-3)  - Non-pharmacological pain treatment recommendations  - Warm/ Cool packs PRN   - Repositioning extremity, elevation, imagery, relaxation, distraction.  - Physical therapy OOB if no contraindications   Recommendations discussed with primary team and RN.

## 2023-08-09 NOTE — PROGRESS NOTE ADULT - SUBJECTIVE AND OBJECTIVE BOX
[facility] : Progress Note :: [phone], (office [phone]),   Dr Mosher / Dr Washington / Dr Young / Dr Doll / Dr Varsha TURCIOS / Dr Tello / Dr Garcia / Dr Larry qiu  _____________________________________________________________________________________________  patient agreed to HD today.    No Known Drug Allergies  fish (Rash)  liver (Anaphylaxis)    Hospital Medications:   MEDICATIONS  (STANDING):  albuterol/ipratropium for Nebulization 3 milliLiter(s) Nebulizer every 6 hours  amLODIPine   Tablet 10 milliGRAM(s) Oral daily  aspirin enteric coated 81 milliGRAM(s) Oral daily  atorvastatin 40 milliGRAM(s) Oral at bedtime  budesonide 160 MICROgram(s)/formoterol 4.5 MICROgram(s) Inhaler 2 Puff(s) Inhalation two times a day  chlorhexidine 2% Cloths 1 Application(s) Topical daily  dextrose 5%. 1000 milliLiter(s) (50 mL/Hr) IV Continuous <Continuous>  dextrose 5%. 1000 milliLiter(s) (100 mL/Hr) IV Continuous <Continuous>  dextrose 50% Injectable 25 Gram(s) IV Push once  dextrose 50% Injectable 25 Gram(s) IV Push once  dextrose 50% Injectable 12.5 Gram(s) IV Push once  epoetin beverley (PROCRIT) Injectable 53389 Unit(s) IV Push <User Schedule>  folic acid 1 milliGRAM(s) Oral daily  furosemide   Injectable 40 milliGRAM(s) IV Push daily  glucagon  Injectable 1 milliGRAM(s) IntraMuscular once  hydrALAZINE Injectable 5 milliGRAM(s) IV Push three times a day  insulin glargine Injectable (LANTUS) 4 Unit(s) SubCutaneous at bedtime  latanoprost 0.005% Ophthalmic Solution 1 Drop(s) Both EYES at bedtime  levothyroxine 25 MICROGram(s) Oral daily  lidocaine   4% Patch 2 Patch Transdermal daily  metoprolol tartrate Injectable 5 milliGRAM(s) IV Push every 12 hours  pantoprazole  Injectable 40 milliGRAM(s) IV Push every 12 hours  sertraline 200 milliGRAM(s) Oral daily  sevelamer carbonate 800 milliGRAM(s) Oral three times a day with meals  sucralfate 1 Gram(s) Oral four times a day        VITALS:  T(F): 98.1 (08-09-23 @ 11:53), Max: 99.1 (08-09-23 @ 05:00)  HR: 88 (08-09-23 @ 14:30)  BP: 154/82 (08-09-23 @ 14:30)  RR: 18 (08-09-23 @ 14:10)  SpO2: 94% (08-09-23 @ 14:10)  Wt(kg): --      PHYSICAL EXAM:  Constitutional: NAD  HEENT: anicteric sclera, oropharynx clear,  Neck: No JVD  Respiratory: CTAB, no wheezes, rales or rhonchi  Cardiovascular: S1, S2, RRR  Gastrointestinal: BS+, soft, NT/ND  Extremities:  peripheral edema  Neurological: A/O x 3,   Vascular Access: AVF with thrill and bruit     LABS:  08-09    139  |  98  |  82<H>  ----------------------------<  123<H>  4.2   |  23  |  17.50<H>    Ca    7.9<L>      09 Aug 2023 05:50  Phos  7.2     08-08  Mg     2.8     08-08      Creatinine Trend: 17.50 <--, 17.40 <--, 16.90 <--, 15.40 <--                        7.6    3.33  )-----------( 83       ( 09 Aug 2023 05:50 )             24.5     Urine Studies:  Urinalysis Basic - ( 09 Aug 2023 05:50 )    Color:  / Appearance:  / SG:  / pH:   Gluc: 123 mg/dL / Ketone:   / Bili:  / Urobili:    Blood:  / Protein:  / Nitrite:    Leuk Esterase:  / RBC:  / WBC    Sq Epi:  / Non Sq Epi:  / Bacteria:         RADIOLOGY & ADDITIONAL STUDIES:

## 2023-08-09 NOTE — PROGRESS NOTE ADULT - PROBLEM SELECTOR PLAN 3
H/o ESRD on HD (T/Th/Sat)  - Pt refused HD on 8/5  - 8/8 Pt only completed 8 minutes on HD.  - 8/9 NP discussed w/ Nephro.  Next HD per Nephro discussion w/ pt. Plan for HD 8/9.      - Nephro-Dr. Mosher, following

## 2023-08-09 NOTE — PROGRESS NOTE ADULT - PROBLEM SELECTOR PLAN 2
- Echo from 2/2023 shows EF 45-50%, and grade 1 DD. Compared with echocardiogram report of 2/24/2023, LVEF, improved on current study.  - C/w Lasix   - Cardiology-Dr. Cheko riddle   - Pulmonology-Dr. Gertrude riddle

## 2023-08-09 NOTE — PROGRESS NOTE ADULT - ASSESSMENT
Search Terms: Rush Lassiter, 1960Search Date: 08/08/2023 08:20:32 AM  The Drug Utilization Report below displays all of the controlled substance prescriptions, if any, that your patient has filled in the last twelve months. The information displayed on this report is compiled from pharmacy submissions to the Department, and accurately reflects the information as submitted by the pharmacies.    This report was requested by: Nimisha Gatica | Reference #: [id number]    There are no results for the search terms that you entered.

## 2023-08-09 NOTE — PROGRESS NOTE ADULT - SUBJECTIVE AND OBJECTIVE BOX
NP Note discussed with  primary attending    Patient is a 62y old  Male who presents with a chief complaint of Missed dialysis (09 Aug 2023 15:56)      INTERVAL HPI/OVERNIGHT EVENTS:  Pt seen sitting by nursing station.  Denies HA, dizziness, SOB, CP, abdominal pain.  Asked "can I go home after dialysis."  NP informed pt that NP would have to discuss w/ Dr. De La Torre.  Pt verbalized understanding.      MEDICATIONS  (STANDING):  albuterol/ipratropium for Nebulization 3 milliLiter(s) Nebulizer every 6 hours  amLODIPine   Tablet 10 milliGRAM(s) Oral daily  aspirin enteric coated 81 milliGRAM(s) Oral daily  atorvastatin 40 milliGRAM(s) Oral at bedtime  budesonide 160 MICROgram(s)/formoterol 4.5 MICROgram(s) Inhaler 2 Puff(s) Inhalation two times a day  chlorhexidine 2% Cloths 1 Application(s) Topical daily  dextrose 5%. 1000 milliLiter(s) (50 mL/Hr) IV Continuous <Continuous>  dextrose 5%. 1000 milliLiter(s) (100 mL/Hr) IV Continuous <Continuous>  dextrose 50% Injectable 25 Gram(s) IV Push once  dextrose 50% Injectable 25 Gram(s) IV Push once  dextrose 50% Injectable 12.5 Gram(s) IV Push once  epoetin beverley (PROCRIT) Injectable 81019 Unit(s) IV Push <User Schedule>  folic acid 1 milliGRAM(s) Oral daily  furosemide   Injectable 40 milliGRAM(s) IV Push daily  glucagon  Injectable 1 milliGRAM(s) IntraMuscular once  hydrALAZINE Injectable 5 milliGRAM(s) IV Push three times a day  insulin glargine Injectable (LANTUS) 4 Unit(s) SubCutaneous at bedtime  latanoprost 0.005% Ophthalmic Solution 1 Drop(s) Both EYES at bedtime  levothyroxine 25 MICROGram(s) Oral daily  lidocaine   4% Patch 2 Patch Transdermal daily  metoprolol tartrate Injectable 5 milliGRAM(s) IV Push every 12 hours  pantoprazole  Injectable 40 milliGRAM(s) IV Push every 12 hours  sertraline 200 milliGRAM(s) Oral daily  sevelamer carbonate 800 milliGRAM(s) Oral three times a day with meals  sucralfate 1 Gram(s) Oral four times a day    MEDICATIONS  (PRN):  acetaminophen     Tablet .. 975 milliGRAM(s) Oral every 8 hours PRN Moderate Pain (4 - 6)  dextrose Oral Gel 15 Gram(s) Oral once PRN Blood Glucose LESS THAN 70 milliGRAM(s)/deciliter  LORazepam   Injectable 0.5 milliGRAM(s) IV Push every 4 hours PRN agitation and nausea  ondansetron   Disintegrating Tablet 4 milliGRAM(s) Oral two times a day PRN Nausea and/or Vomiting  ondansetron Injectable 4 milliGRAM(s) IV Push every 6 hours PRN Nausea and/or Vomiting      __________________________________________________  REVIEW OF SYSTEMS:    CONSTITUTIONAL: No fever  EYES: No acute visual disturbances  NECK: No pain or stiffness  RESPIRATORY: No cough; No shortness of breath  CARDIOVASCULAR: No chest pain, no palpitations  GASTROINTESTINAL: No pain. No nausea or vomiting.  No diarrhea   NEUROLOGICAL: No headache or numbness, no tremors  MUSCULOSKELETAL: No joint pain, no muscle pain  GENITOURINARY: No dysuria, no frequency, no hesitancy  PSYCHIATRY: No depression , no anxiety  ALL OTHER  ROS negative        Vital Signs Last 24 Hrs  T(C): 36.7 (09 Aug 2023 11:53), Max: 37.3 (09 Aug 2023 05:00)  T(F): 98.1 (09 Aug 2023 11:53), Max: 99.1 (09 Aug 2023 05:00)  HR: 88 (09 Aug 2023 14:30) (88 - 100)  BP: 154/82 (09 Aug 2023 14:30) (132/66 - 187/101)  RR: 18 (09 Aug 2023 14:10) (17 - 18)  SpO2: 94% (09 Aug 2023 14:10) (93% - 98%)    Parameters below as of 09 Aug 2023 14:10  Patient On (Oxygen Delivery Method): room air        ________________________________________________  PHYSICAL EXAM:  GENERAL: NAD  HEENT: Normocephalic;  conjunctivae and sclerae clear; moist mucous membranes   NECK : Supple  CHEST/LUNG: Clear to auscultation bilaterally with good air entry   HEART: S1 S2  regular; no murmurs, gallops or rubs  ABDOMEN: Soft, Nontender, Nondistended; Bowel sounds present x 4 quad  EXTREMITIES: No cyanosis; no edema; no calf tenderness.  (+) R. AVF.  (+) L. BKA.  SKIN: Warm and dry; no rash  NERVOUS SYSTEM:  Awake and alert; Oriented to place, person and time; no new deficits    _________________________________________________  LABS:                        7.6    3.33  )-----------( 83       ( 09 Aug 2023 05:50 )             24.5     08-09    139  |  98  |  82<H>  ----------------------------<  123<H>  4.2   |  23  |  17.50<H>    Ca    7.9<L>      09 Aug 2023 05:50  Phos  7.2     08-08  Mg     2.8     08-08        Urinalysis Basic - ( 09 Aug 2023 05:50 )    Color: x / Appearance: x / SG: x / pH: x  Gluc: 123 mg/dL / Ketone: x  / Bili: x / Urobili: x   Blood: x / Protein: x / Nitrite: x   Leuk Esterase: x / RBC: x / WBC x   Sq Epi: x / Non Sq Epi: x / Bacteria: x      CAPILLARY BLOOD GLUCOSE      POCT Blood Glucose.: 109 mg/dL (09 Aug 2023 16:02)  POCT Blood Glucose.: 143 mg/dL (09 Aug 2023 11:23)  POCT Blood Glucose.: 164 mg/dL (08 Aug 2023 21:30)  POCT Blood Glucose.: 161 mg/dL (08 Aug 2023 16:57)        RADIOLOGY & ADDITIONAL TESTS:    Imaging Personally Reviewed:  YES/NO    Consultant(s) Notes Reviewed:   YES/ No    Care Discussed with Consultants :     Plan of care was discussed with patient and /or primary care giver; all questions and concerns were addressed and care was aligned with patient's wishes.

## 2023-08-09 NOTE — PROGRESS NOTE ADULT - ASSESSMENT
62 year old male from [facility], walks with RW, with PMH of ESRD on HD (TTS), BKA- L w/prosthetic leg, DM, Left eye blindness, CHF, CAD, HTN, HLD, osteoporosis, bronchitis, current smoker, and anemia who presents with complaint of missed dialysis and does not make any urine. Patient states he was having shortness of breath. Patient admitted to telemetry for Acute Hyperkalemia 2/2 missed dialysis found to have pulmonary edema and BNP [id number]. Cardiology and nephro following.     Pt refusing HD.  NP escalated to Attending who informed NP that there's a planned mtg w/ pt and Brother today.

## 2023-08-09 NOTE — PROGRESS NOTE ADULT - SUBJECTIVE AND OBJECTIVE BOX
Patient is a 62y old  Male who presents with a chief complaint of Missed dialysis (08 Aug 2023 18:54)    PATIENT IS SEEN AND EXAMINED IN MEDICAL FLOOR.  MARIIT [    ]    JEREMY [   ]      GT [   ]    ALLERGIES:  No Known Drug Allergies  fish (Rash)  liver (Anaphylaxis)      Daily     Daily Weight in k.7 (08 Aug 2023 17:26)    VITALS:    Vital Signs Last 24 Hrs  T(C): 37.3 (09 Aug 2023 05:00), Max: 37.3 (09 Aug 2023 05:00)  T(F): 99.1 (09 Aug 2023 05:00), Max: 99.1 (09 Aug 2023 05:00)  HR: 88 (09 Aug 2023 06:35) (88 - 100)  BP: 158/63 (09 Aug 2023 06:35) (158/63 - 187/101)  BP(mean): --  RR: 18 (09 Aug 2023 05:00) (17 - 18)  SpO2: 94% (09 Aug 2023 06:35) (93% - 98%)    Parameters below as of 09 Aug 2023 06:35  Patient On (Oxygen Delivery Method): nasal cannula  O2 Flow (L/min): 2      LABS:    CBC Full  -  ( 09 Aug 2023 05:50 )  WBC Count : 3.33 K/uL  RBC Count : 2.77 M/uL  Hemoglobin : 7.6 g/dL  Hematocrit : 24.5 %  Platelet Count - Automated : 83 K/uL  Mean Cell Volume : 88.4 fl  Mean Cell Hemoglobin : 27.4 pg  Mean Cell Hemoglobin Concentration : 31.0 gm/dL  Auto Neutrophil # : x  Auto Lymphocyte # : x  Auto Monocyte # : x  Auto Eosinophil # : x  Auto Basophil # : x  Auto Neutrophil % : x  Auto Lymphocyte % : x  Auto Monocyte % : x  Auto Eosinophil % : x  Auto Basophil % : x          139  |  98  |  82<H>  ----------------------------<  123<H>  4.2   |  23  |  17.50<H>    Ca    7.9<L>      09 Aug 2023 05:50  Phos  7.2     08-08  Mg     2.8     08-08      CAPILLARY BLOOD GLUCOSE      POCT Blood Glucose.: 164 mg/dL (08 Aug 2023 21:30)  POCT Blood Glucose.: 161 mg/dL (08 Aug 2023 16:57)  POCT Blood Glucose.: 138 mg/dL (08 Aug 2023 12:25)          Creatinine Trend: 17.50<--, 17.40<--, 16.90<--, 15.40<--, 16.10<--, 13.80<--  I&O's Summary          .Blood Blood-Peripheral  05- @ 14:07   No Growth Final  --  --          MEDICATIONS:    MEDICATIONS  (STANDING):  albuterol/ipratropium for Nebulization 3 milliLiter(s) Nebulizer every 6 hours  amLODIPine   Tablet 10 milliGRAM(s) Oral daily  aspirin enteric coated 81 milliGRAM(s) Oral daily  atorvastatin 40 milliGRAM(s) Oral at bedtime  budesonide 160 MICROgram(s)/formoterol 4.5 MICROgram(s) Inhaler 2 Puff(s) Inhalation two times a day  chlorhexidine 2% Cloths 1 Application(s) Topical daily  dextrose 5%. 1000 milliLiter(s) (50 mL/Hr) IV Continuous <Continuous>  dextrose 5%. 1000 milliLiter(s) (100 mL/Hr) IV Continuous <Continuous>  dextrose 50% Injectable 25 Gram(s) IV Push once  dextrose 50% Injectable 12.5 Gram(s) IV Push once  dextrose 50% Injectable 25 Gram(s) IV Push once  epoetin beverley (PROCRIT) Injectable 06172 Unit(s) IV Push <User Schedule>  folic acid 1 milliGRAM(s) Oral daily  furosemide   Injectable 40 milliGRAM(s) IV Push daily  glucagon  Injectable 1 milliGRAM(s) IntraMuscular once  hydrALAZINE Injectable 5 milliGRAM(s) IV Push three times a day  insulin glargine Injectable (LANTUS) 4 Unit(s) SubCutaneous at bedtime  latanoprost 0.005% Ophthalmic Solution 1 Drop(s) Both EYES at bedtime  levothyroxine 25 MICROGram(s) Oral daily  lidocaine   4% Patch 2 Patch Transdermal daily  metoprolol tartrate Injectable 5 milliGRAM(s) IV Push every 12 hours  pantoprazole  Injectable 40 milliGRAM(s) IV Push every 12 hours  sertraline 200 milliGRAM(s) Oral daily  sevelamer carbonate 800 milliGRAM(s) Oral three times a day with meals  sucralfate 1 Gram(s) Oral four times a day      MEDICATIONS  (PRN):  acetaminophen     Tablet .. 975 milliGRAM(s) Oral every 8 hours PRN Moderate Pain (4 - 6)  dextrose Oral Gel 15 Gram(s) Oral once PRN Blood Glucose LESS THAN 70 milliGRAM(s)/deciliter  haloperidol    Injectable 0.5 milliGRAM(s) IntraMuscular every 8 hours PRN Agitation  LORazepam   Injectable 0.5 milliGRAM(s) IV Push every 4 hours PRN agitation and nausea  ondansetron   Disintegrating Tablet 4 milliGRAM(s) Oral two times a day PRN Nausea and/or Vomiting  ondansetron Injectable 4 milliGRAM(s) IV Push every 6 hours PRN Nausea and/or Vomiting      REVIEW OF SYSTEMS:                           ALL ROS DONE [ X   ]    CONSTITUTIONAL:  LETHARGIC [   ], FEVER [   ], UNRESPONSIVE [   ]  CVS:  CP  [   ], SOB, [   ], PALPITATIONS [   ], DIZZYNESS [   ]  RS: COUGH [   ], SPUTUM [   ]  GI: ABDOMINAL PAIN [   ], NAUSEA [   ], VOMITINGS [   ], DIARRHEA [   ], CONSTIPATION [   ]  :  DYSURIA [   ], NOCTURIA [   ], INCREASED FREQUENCY [   ], DRIBLING [   ],  SKELETAL: PAINFUL JOINTS [   ], SWOLLEN JOINTS [   ], NECK ACHE [   ], LOW BACK ACHE [   ],  SKIN : ULCERS [   ], RASH [   ], ITCHING [   ]  CNS: HEAD ACHE [   ], DOUBLE VISION [   ], BLURRED VISION [   ], AMS / CONFUSION [   ], SEIZURES [   ], WEAKNESS [   ],TINGLING / NUMBNESS [   ]      PHYSICAL EXAMINATION:  GENERAL APPEARANCE: NO DISTRESS,    ANASARCA ++  HEENT:  NO PALLOR, NO  JVD,  NO   NODES, NECK SUPPLE  CVS: S1 +, S2 +,   RS: AEEB,  OCCASIONAL  RALES +,   CRACKLES+ AT LUNG BASES  ABD: SOFT, NT, NO, BS +  EXT: LEFT BKA  SKIN: WARM,   SKELETAL:  ROM ACCEPTABLE  CNS:  AAO X  2-3      RADIOLOGY :    RADIOLOGY AND READINGS REVIEWED    ASSESSMENT :     Hypervolemia    No pertinent past medical history    Hypertension    Adrenal insufficiency    CKD (chronic kidney disease)    Anemia    Glaucoma    Coronary artery disease    HLD (hyperlipidemia)    Peripheral vascular disease    Spinal stenosis of lumbosacral region    Hyperparathyroidism    Diabetes mellitus    Diabetic neuropathy    Contracture of hand    Osteoarthritis    Osteoporosis    Vision loss of left eye    ESRD on hemodialysis    Cataract    BPH (benign prostatic hyperplasia)    UTI (urinary tract infection)    Bladder mass    H/O hematuria    Osteoporosis    Vision loss of right eye    Depression    Chronic GERD    Osteomyelitis of vertebra    CHF (congestive heart failure)    No significant past surgical history    Below knee amputation status, left    History of right cataract extraction    History of left cataract extraction    S/P arteriovenous (AV) fistula creation    H/O hematuria    H/O transurethral destruction of bladder lesion    History of excision of mass        PLAN:  HPI:  Patient is 62 year old male from [facility], walks with RW, with PMH of ESRD on HD (TTS), BKA- L w/prosthetic leg, DM, Left eye blindness, CHF, CAD, HTN, HLD, osteoporosis, bronchitis, current smoker, and anemia who presents with complaint of missed dialysis. Last HD . Pt states he often missed HD sessions.  patient states he missed this HD session due to mild pain in left leg, patient remains able to ambulate. Pt complains of right leg swelling and states he does not make any urine. Patient states he was having mild shortness of breath.  Pt denies any fever, chills, N/V/D, constipation, CP, numbness or tingling (2023 19:20)    # CASE DISCUSSED AT LENGTH WITH PATIENT'S BROTHER ARTEMIO @ [phone]. DISCUSSED REGARDING CURRENT CLINICAL CONDITION, RECOMMENDED EVALUATIONS AND INTERVENTIONS. ALL QUESTIONS ANSWERED. DISCUSSED THAT PROGNOSIS IS POOR/GRIM GIVEN UNDERLYING MEDICAL CONDITIONS. ALSO DISCUSSED THAT DESPITE VERBALIZING UNDERSTANDING OF MEDICAL CONDITIONS AND RECOMMENDED INTERVENTIONS - PATIENT PERSISTENTLY REMAINS NONCOMPLIANT. TEAM HAS OFFERED PATIENT EMOTIONAL SUPPORT AND OFFERED MEDICATION FOR MOOD. FAMILY HAS ALSO COUNSELLED PATIENT AT LENGTH AND UNDERSTAND THAT HIS PROGNOSIS IS POOR GIVEN WORSENING CLINICAL CONDITION AND HIS RECURRENT NONCOMPLIANCE. FAMILY ALSO CONVEYS THAT THEY ARE AGREEABLE TO PALLIATIVE CARE DISCUSSION WITH PATIENT AND FAMILY []   - PALLIATIVE CARE AND PSYCHIATRY RE-CONSULTED. FAMILY IS AGREEABLE.   - BROTHER WISHES FOR PATIENT TO TRANSITION TO NURSING FACILITY WITH ONSITE HD. GIVEN PATIENT'S INCREASED CARE NEEDS - PATIENT WOULD BENEFIT FROM LTP W/ ONSITE HD.       # PATIENT W/ HISTORY OF ROUTINE NONCOMPLIANCE W/ LIFE-SUSTAINING TREATMENT [HD], EVALUATIONS AND INTERVENTIONS - COUNSELLED AT LENGTH TO REMAIN COMPLIANT. D/W PATIENT THAT PROGNOSIS IS GRIM/POOR. HE VERBALIZED UNDERSTANDING. REGARDING GOC - PATIENT WISHES FOR FULL CODE. PALLIATIVE CARE CONSULTED. PSYCHIATRY CONSULTED.  - PATIENT IS ORIENTED TO PERSON, PLACE AND CIRCUMSTANCE. HE EXPRESSED THAT HE DOES GROW IMPATIENT DURING DIALYSIS SESSIONS AND HAS BEEN LEAVING SESSIONS SOONER THAN PRESCRIBED. IN DISCUSSION THAT RISKS OF DEFERRING COMPLETE HD - INCLUDE BUT ARE NOT LIMITED TO WORSENING CLINICAL CONDITION, ELECTROLYTE ABNORMALITIES, ARRHYTHMIA AND DEATH. HE VERBALIZED UNDERSTANDING. HE REFUSES TO CONSIDER A NURSING HOME WITH ONSITE HD.   - D/W PATIENT THAT REPEATEDLY REFUSING INTERVENTIONS AND ASSESSMENTS IS NOT IN LINE WITH HIS WISHES TO IMPROVE. PATIENT VERBALIZED UNDERSTANDING AND AGREEMENT. IN DISCUSSION, REGARDING POSSIBLE ETIOLOGY - PSYCHIATRY WAS CONSULTED TO DISCUSS MOOD, PATIENT DOES EXPRESS THAT HE HAS SOME DEPRESSION GIVEN HIS MEDICAL CONDITIONS. BUT HE DENIES THAT THIS IS THE ETIOLOGY FOR NONCOMPLIANCE. HE EXPLAINS THAT HE DOES NOT LIKE BEING CONFINED TO A DIALYSIS MACHINE. OFFERED FOR PATIENT TO CONSIDER NURSING HOME W/ ONSITE HD. PATIENT WISHES TO CONTINUE TO LIVE IN ASSISTED LIVING.    - [] - PATIENT CONTINUES TO BE NONCOMPLIANT WITH HD SESSIONS, REQUESTING TERMINATION OF SESSIONS SHORTLY AFTER INITIATION OR REFUSING TO PARTICIPATE IN HD WHEN ORDERED. PATIENT AND FAMILY HAVE BEEN COUNSELLED AT LENGTH ABOUT THE RISKS OF NONCOMPLIANCE WITH MEDICAL CARE. PALLIATIVE CARE CONSULT IN PROGRESS    # ACUTE ON CHRONIC HYPOXIC RESPIRATORY FAILURE S/T PULMONARY EDEMA - S/T INCOMPLETE HD SESSIONS ; ANASARCA  # HYPERKALEMIA  # HX OF COPD + S/P RECENT MULTIFOCAL PNEUMONIA ; R/O ASPIRATION PNEUMONIA  # PULMONARY HYPERTENSION  # UNCONTROLLED HTN  # ESRD ON HD TTS - W/ HX OF NONCOMPLIANCE    - TELEMETRY  - HYDRALAZINE, METOPROLOL AND NORVASC ; PRN IV ANTIHYPERTENSIVE  - LOKELMA  - SUPPLEMENTAL O2  - PLANNED FOR HD  - NEPHROLOGY CONSULT  - PULMONOLOGY CONSULT  - ID CONSULT    - CONTINUES TO REFUSE OR PRE-MATURELY DISCONTINUE SESSIONS OF HD      - [] - OVERNIGHT RAPID RESPONSE S/T HYPOXIA - SELF-LIMITED - PATIENT IMPROVED S/P O2 SUPPLEMENT  - S/P CEFEPIME + FLAGYLL, BCX - NGTD      # RECURRENT INTRACTABLE NAUSEA/VOMITING, ? COFFEE GROUND EMESIS  # GASTROPARESIS  # HISTORY OF ESOPHAGITIS AND DUODENITIS [2021], HX OF GASTROPARESIS W/ EVIDENCE OF THICKENED ESOPHAGUS ON PREVIOUS CT SCAN   # PANCREAS W/ DOUBLE DUCT SIGN    - MONITORING HGB, PLACED ON PPI BID , CARAFATE QID  - PRN ANTIEMETICS  ; S/P DOSE OF REGLAN [WITH MINIMAL IMPROVEMENT]  - MONITORING FOR SYMPTOMS  - WHILE ON DIET PATIENT REPEATEDLY COUNSELLED TO CHEW FOOD CAUTIOUSLY AND CONSUME SMALL BITES W/ REGULAR CLEARING WITH WATER BETWEEN BITES    - ADVANCE DIET AS TOLERATED  - NOTED CT A/P    - S/P RECENT PRBC TRANSFUSION     - GI CONSULT  - SURGERY CONSULT    # LESS LIKELY CHOLECYSTITIS  - S/P ABX  - NOTED CT A/P  - HIDA SCAN - NEGATIVE  - SURGERY CONSULT    # ELEVATED TROPONINS - ? DEMAND ISCHEMIA    - S/P TELEMETRY  - TREND TROPONINS  - ECHO - TRACE MR, MODERATELY INCREASED LV WALL THICKNESS, NORMAL LV SYSTOLIC FUNCTION, SEVERE PULMONARY HTN  - CARDIOLOGY CONSULT    # EPISODE OF HYPOGLYCEMIA - IMPROVED  # GASTROPARESIS  # UNDERLYING DM  - SSI + FS  - COUNSELLED TO COMPLY WITH HD TO REDUCE UREMIA    - REPEATEDLY COUNSELLED TO COMPLY WITH FINGERSTICKS      # DEPRESSION  - PLACED ON ZOLOFT  - PSYCHIATRY CONSULT    # PATIENT UNDERGOING OUTPATIENT WORKUP FOR CENTRAL VENOUS STENOSIS THROUGH NEPHROLOGY TEAM  - ? s/p STENT PLACEMENT    # ANEMIA OF CKD  # HX OF PANCYTOPENIA   - TREND HGB, TRANSFUSION THRESHOLD HGB < 7; PATIENT STILL INTERMITTENTLY REFUSING BLOODWORK, COUNSELLED TO COMPLY  - TYPE AND SCREEN  - ON EPO  - DENIES RECENT HEMATEMESIS, MELENA, HEMATOCHEZIA    - S/P RECENT PRBC TRANSFUSION  - S/P PRBC TRANSFUSION     - PPI BID, CARAFATE QID    # SEVERE PROTEIN CALORIE MALNUTRITION, FAILURE TO THRIVE   -  TEMPORAL WASTING, LOSS OF MUSCLE MASS FROM SHOULDER AND HIP GIRDLE  - NUTRITIONAL SUPPLEMENT    # HLD  # PARTIALLY BLIND  # S/P LEFT BKA  # HX OF PVD  # LS SPINAL STENOSIS  # GI AND DVT PPX   Patient is a 62y old  Male who presents with a chief complaint of Missed dialysis (08 Aug 2023 18:54)    PATIENT IS SEEN AND EXAMINED IN MEDICAL FLOOR.      ALLERGIES:  No Known Drug Allergies  fish (Rash)  liver (Anaphylaxis)      Daily     Daily Weight in k.7 (08 Aug 2023 17:26)    VITALS:    Vital Signs Last 24 Hrs  T(C): 37.3 (09 Aug 2023 05:00), Max: 37.3 (09 Aug 2023 05:00)  T(F): 99.1 (09 Aug 2023 05:00), Max: 99.1 (09 Aug 2023 05:00)  HR: 88 (09 Aug 2023 06:35) (88 - 100)  BP: 158/63 (09 Aug 2023 06:35) (158/63 - 187/101)  BP(mean): --  RR: 18 (09 Aug 2023 05:00) (17 - 18)  SpO2: 94% (09 Aug 2023 06:35) (93% - 98%)    Parameters below as of 09 Aug 2023 06:35  Patient On (Oxygen Delivery Method): nasal cannula  O2 Flow (L/min): 2      LABS:    CBC Full  -  ( 09 Aug 2023 05:50 )  WBC Count : 3.33 K/uL  RBC Count : 2.77 M/uL  Hemoglobin : 7.6 g/dL  Hematocrit : 24.5 %  Platelet Count - Automated : 83 K/uL  Mean Cell Volume : 88.4 fl  Mean Cell Hemoglobin : 27.4 pg  Mean Cell Hemoglobin Concentration : 31.0 gm/dL  Auto Neutrophil # : x  Auto Lymphocyte # : x  Auto Monocyte # : x  Auto Eosinophil # : x  Auto Basophil # : x  Auto Neutrophil % : x  Auto Lymphocyte % : x  Auto Monocyte % : x  Auto Eosinophil % : x  Auto Basophil % : x          139  |  98  |  82<H>  ----------------------------<  123<H>  4.2   |  23  |  17.50<H>    Ca    7.9<L>      09 Aug 2023 05:50  Phos  7.2     08-08  Mg     2.8     08-08      CAPILLARY BLOOD GLUCOSE      POCT Blood Glucose.: 164 mg/dL (08 Aug 2023 21:30)  POCT Blood Glucose.: 161 mg/dL (08 Aug 2023 16:57)  POCT Blood Glucose.: 138 mg/dL (08 Aug 2023 12:25)          Creatinine Trend: 17.50<--, 17.40<--, 16.90<--, 15.40<--, 16.10<--, 13.80<--  I&O's Summary          .Blood Blood-Peripheral  05- @ 14:07   No Growth Final  --  --          MEDICATIONS:    MEDICATIONS  (STANDING):  albuterol/ipratropium for Nebulization 3 milliLiter(s) Nebulizer every 6 hours  amLODIPine   Tablet 10 milliGRAM(s) Oral daily  aspirin enteric coated 81 milliGRAM(s) Oral daily  atorvastatin 40 milliGRAM(s) Oral at bedtime  budesonide 160 MICROgram(s)/formoterol 4.5 MICROgram(s) Inhaler 2 Puff(s) Inhalation two times a day  chlorhexidine 2% Cloths 1 Application(s) Topical daily  dextrose 5%. 1000 milliLiter(s) (50 mL/Hr) IV Continuous <Continuous>  dextrose 5%. 1000 milliLiter(s) (100 mL/Hr) IV Continuous <Continuous>  dextrose 50% Injectable 25 Gram(s) IV Push once  dextrose 50% Injectable 12.5 Gram(s) IV Push once  dextrose 50% Injectable 25 Gram(s) IV Push once  epoetin beverley (PROCRIT) Injectable 95711 Unit(s) IV Push <User Schedule>  folic acid 1 milliGRAM(s) Oral daily  furosemide   Injectable 40 milliGRAM(s) IV Push daily  glucagon  Injectable 1 milliGRAM(s) IntraMuscular once  hydrALAZINE Injectable 5 milliGRAM(s) IV Push three times a day  insulin glargine Injectable (LANTUS) 4 Unit(s) SubCutaneous at bedtime  latanoprost 0.005% Ophthalmic Solution 1 Drop(s) Both EYES at bedtime  levothyroxine 25 MICROGram(s) Oral daily  lidocaine   4% Patch 2 Patch Transdermal daily  metoprolol tartrate Injectable 5 milliGRAM(s) IV Push every 12 hours  pantoprazole  Injectable 40 milliGRAM(s) IV Push every 12 hours  sertraline 200 milliGRAM(s) Oral daily  sevelamer carbonate 800 milliGRAM(s) Oral three times a day with meals  sucralfate 1 Gram(s) Oral four times a day      MEDICATIONS  (PRN):  acetaminophen     Tablet .. 975 milliGRAM(s) Oral every 8 hours PRN Moderate Pain (4 - 6)  dextrose Oral Gel 15 Gram(s) Oral once PRN Blood Glucose LESS THAN 70 milliGRAM(s)/deciliter  haloperidol    Injectable 0.5 milliGRAM(s) IntraMuscular every 8 hours PRN Agitation  LORazepam   Injectable 0.5 milliGRAM(s) IV Push every 4 hours PRN agitation and nausea  ondansetron   Disintegrating Tablet 4 milliGRAM(s) Oral two times a day PRN Nausea and/or Vomiting  ondansetron Injectable 4 milliGRAM(s) IV Push every 6 hours PRN Nausea and/or Vomiting      REVIEW OF SYSTEMS:                           ALL ROS DONE [ X   ]    CONSTITUTIONAL:  LETHARGIC [   ], FEVER [   ], UNRESPONSIVE [   ]  CVS:  CP  [   ], SOB, [   ], PALPITATIONS [   ], DIZZYNESS [   ]  RS: COUGH [   ], SPUTUM [   ]  GI: ABDOMINAL PAIN [   ], NAUSEA [   ], VOMITINGS [   ], DIARRHEA [   ], CONSTIPATION [   ]  :  DYSURIA [   ], NOCTURIA [   ], INCREASED FREQUENCY [   ], DRIBLING [   ],  SKELETAL: PAINFUL JOINTS [   ], SWOLLEN JOINTS [   ], NECK ACHE [   ], LOW BACK ACHE [   ],  SKIN : ULCERS [   ], RASH [   ], ITCHING [   ]  CNS: HEAD ACHE [   ], DOUBLE VISION [   ], BLURRED VISION [   ], AMS / CONFUSION [   ], SEIZURES [   ], WEAKNESS [   ],TINGLING / NUMBNESS [   ]      PHYSICAL EXAMINATION:  GENERAL APPEARANCE: NO DISTRESS,    ANASARCA ++  HEENT:  NO PALLOR, NO  JVD,  NO   NODES, NECK SUPPLE  CVS: S1 +, S2 +,   RS: AEEB,  OCCASIONAL  RALES +,   CRACKLES+ AT LUNG BASES  ABD: SOFT, NT, NO, BS +  EXT: LEFT BKA  SKIN: WARM,   SKELETAL:  ROM ACCEPTABLE  CNS:  AAO X  2-3      RADIOLOGY :    RADIOLOGY AND READINGS REVIEWED    ASSESSMENT :     Hypervolemia    No pertinent past medical history    Hypertension    Adrenal insufficiency    CKD (chronic kidney disease)    Anemia    Glaucoma    Coronary artery disease    HLD (hyperlipidemia)    Peripheral vascular disease    Spinal stenosis of lumbosacral region    Hyperparathyroidism    Diabetes mellitus    Diabetic neuropathy    Contracture of hand    Osteoarthritis    Osteoporosis    Vision loss of left eye    ESRD on hemodialysis    Cataract    BPH (benign prostatic hyperplasia)    UTI (urinary tract infection)    Bladder mass    H/O hematuria    Osteoporosis    Vision loss of right eye    Depression    Chronic GERD    Osteomyelitis of vertebra    CHF (congestive heart failure)    No significant past surgical history    Below knee amputation status, left    History of right cataract extraction    History of left cataract extraction    S/P arteriovenous (AV) fistula creation    H/O hematuria    H/O transurethral destruction of bladder lesion    History of excision of mass        PLAN:  HPI:  Patient is 62 year old male from [facility], walks with RW, with PMH of ESRD on HD (TTS), BKA- L w/prosthetic leg, DM, Left eye blindness, CHF, CAD, HTN, HLD, osteoporosis, bronchitis, current smoker, and anemia who presents with complaint of missed dialysis. Last HD . Pt states he often missed HD sessions.  patient states he missed this HD session due to mild pain in left leg, patient remains able to ambulate. Pt complains of right leg swelling and states he does not make any urine. Patient states he was having mild shortness of breath.  Pt denies any fever, chills, N/V/D, constipation, CP, numbness or tingling (2023 19:20)    # MEETING HELD WITH PATIENT, BROTHER ARTEMIO @ [phone] AND SW TEAM. PATIENT W/ HISTORY OF ROUTINE NONCOMPLIANCE W/ LIFE-SUSTAINING TREATMENT [HD], EVALUATIONS AND INTERVENTIONS - COUNSELLED AT LENGTH TO REMAIN COMPLIANT. DISCUSSED THAT PROGNOSIS IS POOR/GRIM GIVEN UNDERLYING MEDICAL CONDITIONS AND GIVEN NONCOMPLIANCE. HE VERBALIZED UNDERSTANDING. REGARDING GOC - PATIENT WISHES FOR FULL CODE. PALLIATIVE CARE CONSULTED. PSYCHIATRY CONSULTED. PATIENT EXPRESSED THAT HE DOES GROW IMPATIENT DURING DIALYSIS SESSIONS AND HAS BEEN LEAVING SESSIONS SOONER THAN PRESCRIBED. IN DISCUSSION THAT RISKS OF DEFERRING COMPLETE HD - INCLUDE BUT ARE NOT LIMITED TO WORSENING CLINICAL CONDITION, ELECTROLYTE ABNORMALITIES, ARRHYTHMIA AND DEATH. HE VERBALIZED UNDERSTANDING. D/W PATIENT THAT REPEATEDLY REFUSING INTERVENTIONS AND ASSESSMENTS IS NOT IN LINE WITH HIS WISHES TO IMPROVE. PATIENT VERBALIZED UNDERSTANDING. DISCUSSED REGARDING CURRENT CLINICAL CONDITION, RECOMMENDED EVALUATIONS AND INTERVENTIONS. ALL QUESTIONS ANSWERED. TEAM HAS OFFERED PATIENT EMOTIONAL SUPPORT AND OFFERED MEDICATION FOR MOOD. OFFERED TO PRE-MEDICATE W/ ANXIOLYTIC PRIOR TO HD. PATIENT IS AGREEABLE.     DISCUSSED THAT PATIENT WILL NEED TO COMPLY WITH HD SESSIONS IN ORDER TO BE MEDICALLY OPTIMIZED FOR DISCHARGE AND FOR SAFE D/C PLANNING. TEAM DISCUSSED OPTIONS OF RETURNING TO [facility] W/ OUTPATIENT HD , IF CARE NEEDS CAN BE SAFELY MET VS. NURSING HOME/[place] . PATIENT AND BROTHER VERBALIZED UNDERSTANDING.       # ACUTE ON CHRONIC HYPOXIC RESPIRATORY FAILURE S/T PULMONARY EDEMA - S/T INCOMPLETE HD SESSIONS ; ANASARCA  # HYPERKALEMIA  # HX OF COPD + S/P RECENT MULTIFOCAL PNEUMONIA ; R/O ASPIRATION PNEUMONIA  # PULMONARY HYPERTENSION  # UNCONTROLLED HTN  # ESRD ON HD TTS - W/ HX OF NONCOMPLIANCE    - TELEMETRY  - HYDRALAZINE, METOPROLOL AND NORVASC ; PRN IV ANTIHYPERTENSIVE  - LOKELMA  - SUPPLEMENTAL O2  - PLANNED FOR HD  - NEPHROLOGY CONSULT  - PULMONOLOGY CONSULT  - ID CONSULT    - CONTINUES TO REFUSE OR PRE-MATURELY DISCONTINUE SESSIONS OF HD       - [] - OVERNIGHT RAPID RESPONSE S/T HYPOXIA - SELF-LIMITED - PATIENT IMPROVED S/P O2 SUPPLEMENT  - S/P CEFEPIME + FLAGYLL, BCX - NGTD      # RECURRENT INTRACTABLE NAUSEA/VOMITING, ? COFFEE GROUND EMESIS  # GASTROPARESIS  # HISTORY OF ESOPHAGITIS AND DUODENITIS [2021], HX OF GASTROPARESIS W/ EVIDENCE OF THICKENED ESOPHAGUS ON PREVIOUS CT SCAN   # PANCREAS W/ DOUBLE DUCT SIGN    - MONITORING HGB, PLACED ON PPI BID , CARAFATE QID  - PRN ANTIEMETICS  ; S/P DOSE OF REGLAN [WITH MINIMAL IMPROVEMENT]  - MONITORING FOR SYMPTOMS  - WHILE ON DIET PATIENT REPEATEDLY COUNSELLED TO CHEW FOOD CAUTIOUSLY AND CONSUME SMALL BITES W/ REGULAR CLEARING WITH WATER BETWEEN BITES    - ADVANCE DIET AS TOLERATED  - NOTED CT A/P    - S/P RECENT PRBC TRANSFUSION     - GI CONSULT  - SURGERY CONSULT    # LESS LIKELY CHOLECYSTITIS  - S/P ABX  - NOTED CT A/P  - HIDA SCAN - NEGATIVE  - SURGERY CONSULT    # ELEVATED TROPONINS - ? DEMAND ISCHEMIA    - S/P TELEMETRY  - TREND TROPONINS  - ECHO - TRACE MR, MODERATELY INCREASED LV WALL THICKNESS, NORMAL LV SYSTOLIC FUNCTION, SEVERE PULMONARY HTN  - CARDIOLOGY CONSULT    # EPISODE OF HYPOGLYCEMIA - IMPROVED  # GASTROPARESIS  # UNDERLYING DM  - SSI + FS  - COUNSELLED TO COMPLY WITH HD TO REDUCE UREMIA    - REPEATEDLY COUNSELLED TO COMPLY WITH FINGERSTICKS      # DEPRESSION  - PLACED ON ZOLOFT  - PSYCHIATRY CONSULT    # PATIENT UNDERGOING OUTPATIENT WORKUP FOR CENTRAL VENOUS STENOSIS THROUGH NEPHROLOGY TEAM  - ? s/p STENT PLACEMENT    # ANEMIA OF CKD  # HX OF PANCYTOPENIA   - TREND HGB, TRANSFUSION THRESHOLD HGB < 7; PATIENT STILL INTERMITTENTLY REFUSING BLOODWORK, COUNSELLED TO COMPLY  - TYPE AND SCREEN  - ON EPO  - DENIES RECENT HEMATEMESIS, MELENA, HEMATOCHEZIA    - S/P RECENT PRBC TRANSFUSION  - S/P PRBC TRANSFUSION     - PPI BID, CARAFATE QID    # SEVERE PROTEIN CALORIE MALNUTRITION, FAILURE TO THRIVE   -  TEMPORAL WASTING, LOSS OF MUSCLE MASS FROM SHOULDER AND HIP GIRDLE  - NUTRITIONAL SUPPLEMENT    # HLD  # PARTIALLY BLIND  # S/P LEFT BKA  # HX OF PVD  # LS SPINAL STENOSIS  # GI AND DVT PPX

## 2023-08-09 NOTE — PROGRESS NOTE ADULT - ASSESSMENT
# ESRD.  grossly fluid overloaded. Patient only   only did 8 minutes on HD yesterday  agreed to HD today.  will get Ativan pre-HD   F/U on goals of care versus discharge planning  he has very poor prognosis in light of his non-compliance   # anemia of CKD. epogen on HD. transfuse for HBG <7

## 2023-08-09 NOTE — PROGRESS NOTE ADULT - SUBJECTIVE AND OBJECTIVE BOX
Time of Visit:  Patient seen and examined.     MEDICATIONS  (STANDING):  albuterol/ipratropium for Nebulization 3 milliLiter(s) Nebulizer every 6 hours  amLODIPine   Tablet 10 milliGRAM(s) Oral daily  aspirin enteric coated 81 milliGRAM(s) Oral daily  atorvastatin 40 milliGRAM(s) Oral at bedtime  budesonide 160 MICROgram(s)/formoterol 4.5 MICROgram(s) Inhaler 2 Puff(s) Inhalation two times a day  chlorhexidine 2% Cloths 1 Application(s) Topical daily  dextrose 5%. 1000 milliLiter(s) (50 mL/Hr) IV Continuous <Continuous>  dextrose 5%. 1000 milliLiter(s) (100 mL/Hr) IV Continuous <Continuous>  dextrose 50% Injectable 25 Gram(s) IV Push once  dextrose 50% Injectable 12.5 Gram(s) IV Push once  dextrose 50% Injectable 25 Gram(s) IV Push once  epoetin beverley-epbx (RETACRIT) Injectable 52231 Unit(s) IV Push <User Schedule>  folic acid 1 milliGRAM(s) Oral daily  furosemide   Injectable 40 milliGRAM(s) IV Push daily  glucagon  Injectable 1 milliGRAM(s) IntraMuscular once  hydrALAZINE Injectable 5 milliGRAM(s) IV Push three times a day  insulin glargine Injectable (LANTUS) 4 Unit(s) SubCutaneous at bedtime  latanoprost 0.005% Ophthalmic Solution 1 Drop(s) Both EYES at bedtime  levothyroxine 25 MICROGram(s) Oral daily  lidocaine   4% Patch 2 Patch Transdermal daily  metoprolol tartrate Injectable 5 milliGRAM(s) IV Push every 12 hours  pantoprazole  Injectable 40 milliGRAM(s) IV Push every 12 hours  sertraline 200 milliGRAM(s) Oral daily  sevelamer carbonate 800 milliGRAM(s) Oral three times a day with meals  sucralfate 1 Gram(s) Oral four times a day      MEDICATIONS  (PRN):  acetaminophen     Tablet .. 975 milliGRAM(s) Oral every 8 hours PRN Moderate Pain (4 - 6)  dextrose Oral Gel 15 Gram(s) Oral once PRN Blood Glucose LESS THAN 70 milliGRAM(s)/deciliter  LORazepam   Injectable 0.5 milliGRAM(s) IV Push every 4 hours PRN agitation and nausea  ondansetron   Disintegrating Tablet 4 milliGRAM(s) Oral two times a day PRN Nausea and/or Vomiting  ondansetron Injectable 4 milliGRAM(s) IV Push every 6 hours PRN Nausea and/or Vomiting       Medications up to date at time of exam.      PHYSICAL EXAMINATION:  Patient has no new complaints.  GENERAL: The patient is a well-developed, well-nourished, in no apparent distress.     Vital Signs Last 24 Hrs  T(C): 36.7 (09 Aug 2023 11:53), Max: 37.3 (09 Aug 2023 05:00)  T(F): 98.1 (09 Aug 2023 11:53), Max: 99.1 (09 Aug 2023 05:00)  HR: 88 (09 Aug 2023 14:30) (88 - 100)  BP: 154/82 (09 Aug 2023 14:30) (132/66 - 187/101)  BP(mean): --  RR: 18 (09 Aug 2023 14:10) (17 - 18)  SpO2: 94% (09 Aug 2023 14:10) (93% - 98%)    Parameters below as of 09 Aug 2023 14:10  Patient On (Oxygen Delivery Method): room air       (if applicable)    Chest Tube (if applicable)    HEENT: Head is normocephalic and atraumatic. Extraocular muscles are intact. Mucous membranes are moist.     NECK: Supple, no palpable adenopathy.    LUNGS: Clear to auscultation, no wheezing, rales, or rhonchi.    HEART: Regular rate and rhythm without murmur.    ABDOMEN: Soft, nontender, and nondistended.  No hepatosplenomegaly is noted.    : No painful voiding, no pelvic pain    EXTREMITIES: Without any cyanosis, clubbing, rash, lesions or edema.    NEUROLOGIC: Awake,     SKIN: Warm, dry, good turgor.      LABS:                        7.6    3.33  )-----------( 83       ( 09 Aug 2023 05:50 )             24.5     08-09    139  |  98  |  82<H>  ----------------------------<  123<H>  4.2   |  23  |  17.50<H>    Ca    7.9<L>      09 Aug 2023 05:50  Phos  7.2     08-08  Mg     2.8     08-08        Urinalysis Basic - ( 09 Aug 2023 05:50 )    Color: x / Appearance: x / SG: x / pH: x  Gluc: 123 mg/dL / Ketone: x  / Bili: x / Urobili: x   Blood: x / Protein: x / Nitrite: x   Leuk Esterase: x / RBC: x / WBC x   Sq Epi: x / Non Sq Epi: x / Bacteria: x                      MICROBIOLOGY: (if applicable)    RADIOLOGY & ADDITIONAL STUDIES:  EKG:   CXR:  ECHO:    IMPRESSION: 62y Male 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      Osteoarthritis      Vision loss of left eye  blind      ESRD on hemodialysis  T/Th/S      Cataract  both eyes - hx of sx done      BPH (benign prostatic hyperplasia)      UTI (urinary tract infection)  hx of      Bladder mass  hx of      H/O hematuria      Osteoporosis      Vision loss of right eye  decreased      Depression      Chronic GERD      Osteomyelitis of vertebra      CHF (congestive heart failure)      Below knee amputation status, left  2012- pt is wearing prostesis      History of right cataract extraction      History of left cataract extraction      S/P arteriovenous (AV) fistula creation  right arm brachiocephalic arteriovenous fistula on 11/08/2018      H/O hematuria  s/p bladder bx and fulguration 2/25/2020      H/O transurethral destruction of bladder lesion  2020      History of excision of mass  back mass on 03/31/2021       p/w         Impression: This is a 63 Y/O Male from [facility] with ESRD on HD ( T-Th-Sat ). Current smoker . Presented to ED due to Missed Dialysis, last HD 07-22-23 . Per Patient stated that he often missed HD sessions due to Mild left leg pain and right leg swelling . S/p RRT for SOB with Hypoxia due to Acute Hypoxic Respiratory Failure secondary to Pulmonary edema/ Fluid overload due to Missed Dialysis . CT Abdomen with Small Bilateral Pleural Effusions, Rt Lower Lung with groundglass opacity. No bowel obstruction. Small Gall Stone.   Vomiting due to diabetic gastroparesis vs acute cholecystitis  unlikely SBO .       Suggestions:  O2 saturation 96% with O2 supplement. Continue Oxygen supplementation 2L NC.   Continue Duoneb via nebulization Q 6 Hours .   On lasix 40 mg IVP Daily .  Reinforced the importance of compliance to Dialysis schedule.   Aspiration precaution with HOB elevation especially during meal times.                 Time of Visit:  Patient seen and examined.     MEDICATIONS  (STANDING):  albuterol/ipratropium for Nebulization 3 milliLiter(s) Nebulizer every 6 hours  amLODIPine   Tablet 10 milliGRAM(s) Oral daily  aspirin enteric coated 81 milliGRAM(s) Oral daily  atorvastatin 40 milliGRAM(s) Oral at bedtime  budesonide 160 MICROgram(s)/formoterol 4.5 MICROgram(s) Inhaler 2 Puff(s) Inhalation two times a day  chlorhexidine 2% Cloths 1 Application(s) Topical daily  dextrose 5%. 1000 milliLiter(s) (50 mL/Hr) IV Continuous <Continuous>  dextrose 5%. 1000 milliLiter(s) (100 mL/Hr) IV Continuous <Continuous>  dextrose 50% Injectable 25 Gram(s) IV Push once  dextrose 50% Injectable 12.5 Gram(s) IV Push once  dextrose 50% Injectable 25 Gram(s) IV Push once  epoetin beverley-epbx (RETACRIT) Injectable 98447 Unit(s) IV Push <User Schedule>  folic acid 1 milliGRAM(s) Oral daily  furosemide   Injectable 40 milliGRAM(s) IV Push daily  glucagon  Injectable 1 milliGRAM(s) IntraMuscular once  hydrALAZINE Injectable 5 milliGRAM(s) IV Push three times a day  insulin glargine Injectable (LANTUS) 4 Unit(s) SubCutaneous at bedtime  latanoprost 0.005% Ophthalmic Solution 1 Drop(s) Both EYES at bedtime  levothyroxine 25 MICROGram(s) Oral daily  lidocaine   4% Patch 2 Patch Transdermal daily  metoprolol tartrate Injectable 5 milliGRAM(s) IV Push every 12 hours  pantoprazole  Injectable 40 milliGRAM(s) IV Push every 12 hours  sertraline 200 milliGRAM(s) Oral daily  sevelamer carbonate 800 milliGRAM(s) Oral three times a day with meals  sucralfate 1 Gram(s) Oral four times a day      MEDICATIONS  (PRN):  acetaminophen     Tablet .. 975 milliGRAM(s) Oral every 8 hours PRN Moderate Pain (4 - 6)  dextrose Oral Gel 15 Gram(s) Oral once PRN Blood Glucose LESS THAN 70 milliGRAM(s)/deciliter  LORazepam   Injectable 0.5 milliGRAM(s) IV Push every 4 hours PRN agitation and nausea  ondansetron   Disintegrating Tablet 4 milliGRAM(s) Oral two times a day PRN Nausea and/or Vomiting  ondansetron Injectable 4 milliGRAM(s) IV Push every 6 hours PRN Nausea and/or Vomiting       Medications up to date at time of exam.      PHYSICAL EXAMINATION:  Patient has no new complaints.  GENERAL: The patient is a well-developed, well-nourished, in no apparent distress.     Vital Signs Last 24 Hrs  T(C): 36.7 (09 Aug 2023 11:53), Max: 37.3 (09 Aug 2023 05:00)  T(F): 98.1 (09 Aug 2023 11:53), Max: 99.1 (09 Aug 2023 05:00)  HR: 88 (09 Aug 2023 14:30) (88 - 100)  BP: 154/82 (09 Aug 2023 14:30) (132/66 - 187/101)  BP(mean): --  RR: 18 (09 Aug 2023 14:10) (17 - 18)  SpO2: 94% (09 Aug 2023 14:10) (93% - 98%)    Parameters below as of 09 Aug 2023 14:10  Patient On (Oxygen Delivery Method): room air       (if applicable)    Chest Tube (if applicable)    HEENT: Head is normocephalic and atraumatic. Extraocular muscles are intact. Mucous membranes are moist.     NECK: Supple, no palpable adenopathy.    LUNGS: Clear to auscultation, no wheezing, rales, or rhonchi.    HEART: Regular rate and rhythm without murmur.    ABDOMEN: Soft, nontender, and nondistended.  No hepatosplenomegaly is noted.    : No painful voiding, no pelvic pain    EXTREMITIES: Without any cyanosis, clubbing, rash, lesions or edema.    NEUROLOGIC: Awake,     SKIN: Warm, dry, good turgor.      LABS:                        7.6    3.33  )-----------( 83       ( 09 Aug 2023 05:50 )             24.5     08-09    139  |  98  |  82<H>  ----------------------------<  123<H>  4.2   |  23  |  17.50<H>    Ca    7.9<L>      09 Aug 2023 05:50  Phos  7.2     08-08  Mg     2.8     08-08        Urinalysis Basic - ( 09 Aug 2023 05:50 )    Color: x / Appearance: x / SG: x / pH: x  Gluc: 123 mg/dL / Ketone: x  / Bili: x / Urobili: x   Blood: x / Protein: x / Nitrite: x   Leuk Esterase: x / RBC: x / WBC x   Sq Epi: x / Non Sq Epi: x / Bacteria: x                      MICROBIOLOGY: (if applicable)    RADIOLOGY & ADDITIONAL STUDIES:  EKG:   CXR:  ECHO:    IMPRESSION: 62y Male 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      Osteoarthritis      Vision loss of left eye  blind      ESRD on hemodialysis  T/Th/S      Cataract  both eyes - hx of sx done      BPH (benign prostatic hyperplasia)      UTI (urinary tract infection)  hx of      Bladder mass  hx of      H/O hematuria      Osteoporosis      Vision loss of right eye  decreased      Depression      Chronic GERD      Osteomyelitis of vertebra      CHF (congestive heart failure)      Below knee amputation status, left  2012- pt is wearing prostesis      History of right cataract extraction      History of left cataract extraction      S/P arteriovenous (AV) fistula creation  right arm brachiocephalic arteriovenous fistula on 11/08/2018      H/O hematuria  s/p bladder bx and fulguration 2/25/2020      H/O transurethral destruction of bladder lesion  2020      History of excision of mass  back mass on 03/31/2021       p/w         Impression: This is a 63 Y/O Male from [facility] with ESRD on HD ( T-Th-Sat ). Current smoker . Presented to ED due to Missed Dialysis, last HD 07-22-23 . Per Patient stated that he often missed HD sessions due to Mild left leg pain and right leg swelling . S/p RRT for SOB with Hypoxia due to Acute Hypoxic Respiratory Failure secondary to Pulmonary edema/ Fluid overload due to Missed Dialysis . CT Abdomen with Small Bilateral Pleural Effusions, Rt Lower Lung with groundglass opacity. No bowel obstruction. Small Gall Stone.   Vomiting due to diabetic gastroparesis vs acute cholecystitis       Suggestions:  Fluid overload  O2 saturation 96% with O2 supplement. Continue Oxygen supplementation 2L NC.   Continue Duoneb via nebulization Q 6 Hours .   Reinforced the importance of compliance to Dialysis schedule.   Aspiration precaution with HOB elevation especially during meal times.

## 2023-08-09 NOTE — PROGRESS NOTE ADULT - PROBLEM SELECTOR PLAN 5
- D/t ESRD vs. possible gastric ulcer  - 8/8 1U PRBC's ordered but not given.  8/8 pt only completed 8 minutes on HD.  - C/w Procrit   - C/w Protonix & Carafate   - Maintain type and screen Q3D  - GI-Dr. Lee consulted-No planned intervention

## 2023-08-09 NOTE — PROGRESS NOTE ADULT - PROBLEM SELECTOR PLAN 1
- Most likey Acute on chronic combined HF d/t missed HD resulting in volume overload & RLL HAP   - CT A/P noted for RLL PNA.  S/p HAP treatment.  Completed tx for HAP s/p Cefepime and Flagyl.  - S/p Repeat CXR showed worsening HF  - C/w Oxygen supplementation PRN   - C/w Duoneb & Symbicort   - Nephro-Dr. Mosher, following   - Cardiology-Dr. Still following   - Pulmonology-Dr. Loredo following   - ID-Dr. Newby following

## 2023-08-10 NOTE — PROGRESS NOTE ADULT - SUBJECTIVE AND OBJECTIVE BOX
Patient is a 62y old  Male who presents with a chief complaint of Missed dialysis (09 Aug 2023 16:29)    PATIENT IS SEEN AND EXAMINED IN MEDICAL FLOOR.  MARIIT [    ]    JEREMY [   ]      GT [   ]    ALLERGIES:  No Known Drug Allergies  fish (Rash)  liver (Anaphylaxis)      Daily     Daily Weight in k (09 Aug 2023 18:40)    VITALS:    Vital Signs Last 24 Hrs  T(C): 36.6 (10 Aug 2023 05:31), Max: 36.9 (09 Aug 2023 19:31)  T(F): 97.8 (10 Aug 2023 05:31), Max: 98.5 (09 Aug 2023 19:31)  HR: 83 (10 Aug 2023 07:20) (83 - 105)  BP: 147/69 (10 Aug 2023 07:20) (132/66 - 172/80)  BP(mean): --  RR: 20 (10 Aug 2023 05:31) (17 - 20)  SpO2: 91% (10 Aug 2023 05:) (91% - 100%)    Parameters below as of 10 Aug 2023 05:31  Patient On (Oxygen Delivery Method): room air        LABS:    CBC Full  -  ( 10 Aug 2023 06:21 )  WBC Count : 2.99 K/uL  RBC Count : 2.90 M/uL  Hemoglobin : 8.0 g/dL  Hematocrit : 26.1 %  Platelet Count - Automated : 87 K/uL  Mean Cell Volume : 90.0 fl  Mean Cell Hemoglobin : 27.6 pg  Mean Cell Hemoglobin Concentration : 30.7 gm/dL  Auto Neutrophil # : x  Auto Lymphocyte # : x  Auto Monocyte # : x  Auto Eosinophil # : x  Auto Basophil # : x  Auto Neutrophil % : x  Auto Lymphocyte % : x  Auto Monocyte % : x  Auto Eosinophil % : x  Auto Basophil % : x      08-10    141  |  102  |  46<H>  ----------------------------<  42<LL>  3.7   |  26  |  11.70<H>    Ca    7.9<L>      10 Aug 2023 06:21  Phos  4.8     08-10  Mg     2.6     08-10      CAPILLARY BLOOD GLUCOSE      POCT Blood Glucose.: 120 mg/dL (10 Aug 2023 08:41)  POCT Blood Glucose.: 34 mg/dL (10 Aug 2023 08:04)  POCT Blood Glucose.: 36 mg/dL (10 Aug 2023 08:00)  POCT Blood Glucose.: 243 mg/dL (09 Aug 2023 21:25)  POCT Blood Glucose.: 109 mg/dL (09 Aug 2023 16:02)  POCT Blood Glucose.: 143 mg/dL (09 Aug 2023 11:23)          Creatinine Trend: 11.70<--, 17.50<--, 17.40<--, 16.90<--, 15.40<--, 16.10<--  I&O's Summary          .Blood Blood-Peripheral  - @ 14:07   No Growth Final  --  --          MEDICATIONS:    MEDICATIONS  (STANDING):  albuterol/ipratropium for Nebulization 3 milliLiter(s) Nebulizer every 6 hours  amLODIPine   Tablet 10 milliGRAM(s) Oral daily  aspirin enteric coated 81 milliGRAM(s) Oral daily  atorvastatin 40 milliGRAM(s) Oral at bedtime  budesonide 160 MICROgram(s)/formoterol 4.5 MICROgram(s) Inhaler 2 Puff(s) Inhalation two times a day  chlorhexidine 2% Cloths 1 Application(s) Topical daily  dextrose 5%. 1000 milliLiter(s) (100 mL/Hr) IV Continuous <Continuous>  dextrose 5%. 1000 milliLiter(s) (50 mL/Hr) IV Continuous <Continuous>  dextrose 50% Injectable 25 Gram(s) IV Push once  dextrose 50% Injectable 12.5 Gram(s) IV Push once  dextrose 50% Injectable 25 Gram(s) IV Push once  epoetin beverley-epbx (RETACRIT) Injectable 66885 Unit(s) IV Push <User Schedule>  folic acid 1 milliGRAM(s) Oral daily  furosemide   Injectable 40 milliGRAM(s) IV Push daily  glucagon  Injectable 1 milliGRAM(s) IntraMuscular once  hydrALAZINE Injectable 5 milliGRAM(s) IV Push three times a day  insulin glargine Injectable (LANTUS) 4 Unit(s) SubCutaneous at bedtime  latanoprost 0.005% Ophthalmic Solution 1 Drop(s) Both EYES at bedtime  levothyroxine 25 MICROGram(s) Oral daily  lidocaine   4% Patch 2 Patch Transdermal daily  metoprolol tartrate Injectable 5 milliGRAM(s) IV Push every 12 hours  pantoprazole  Injectable 40 milliGRAM(s) IV Push every 12 hours  sertraline 200 milliGRAM(s) Oral daily  sevelamer carbonate 800 milliGRAM(s) Oral three times a day with meals  sucralfate 1 Gram(s) Oral four times a day      MEDICATIONS  (PRN):  acetaminophen     Tablet .. 975 milliGRAM(s) Oral every 8 hours PRN Moderate Pain (4 - 6)  dextrose Oral Gel 15 Gram(s) Oral once PRN Blood Glucose LESS THAN 70 milliGRAM(s)/deciliter  LORazepam   Injectable 0.5 milliGRAM(s) IV Push every 4 hours PRN agitation and nausea  ondansetron   Disintegrating Tablet 4 milliGRAM(s) Oral two times a day PRN Nausea and/or Vomiting  ondansetron Injectable 4 milliGRAM(s) IV Push every 6 hours PRN Nausea and/or Vomiting      REVIEW OF SYSTEMS:                           ALL ROS DONE [ X   ]    CONSTITUTIONAL:  LETHARGIC [   ], FEVER [   ], UNRESPONSIVE [   ]  CVS:  CP  [   ], SOB, [   ], PALPITATIONS [   ], DIZZYNESS [   ]  RS: COUGH [   ], SPUTUM [   ]  GI: ABDOMINAL PAIN [   ], NAUSEA [   ], VOMITINGS [   ], DIARRHEA [   ], CONSTIPATION [   ]  :  DYSURIA [   ], NOCTURIA [   ], INCREASED FREQUENCY [   ], DRIBLING [   ],  SKELETAL: PAINFUL JOINTS [   ], SWOLLEN JOINTS [   ], NECK ACHE [   ], LOW BACK ACHE [   ],  SKIN : ULCERS [   ], RASH [   ], ITCHING [   ]  CNS: HEAD ACHE [   ], DOUBLE VISION [   ], BLURRED VISION [   ], AMS / CONFUSION [   ], SEIZURES [   ], WEAKNESS [   ],TINGLING / NUMBNESS [   ]    PHYSICAL EXAMINATION:  GENERAL APPEARANCE: NO DISTRESS,    ANASARCA ++  HEENT:  NO PALLOR, NO  JVD,  NO   NODES, NECK SUPPLE  CVS: S1 +, S2 +,   RS: AEEB,  OCCASIONAL  RALES +,   CRACKLES+ AT LUNG BASES  ABD: SOFT, NT, NO, BS +  EXT: LEFT BKA  SKIN: WARM,   SKELETAL:  ROM ACCEPTABLE  CNS:  AAO X  2-3      RADIOLOGY :    RADIOLOGY AND READINGS REVIEWED    ASSESSMENT :     Hypervolemia    No pertinent past medical history    Hypertension    Adrenal insufficiency    CKD (chronic kidney disease)    Anemia    Glaucoma    Coronary artery disease    HLD (hyperlipidemia)    Peripheral vascular disease    Spinal stenosis of lumbosacral region    Hyperparathyroidism    Diabetes mellitus    Diabetic neuropathy    Contracture of hand    Osteoarthritis    Osteoporosis    Vision loss of left eye    ESRD on hemodialysis    Cataract    BPH (benign prostatic hyperplasia)    UTI (urinary tract infection)    Bladder mass    H/O hematuria    Osteoporosis    Vision loss of right eye    Depression    Chronic GERD    Osteomyelitis of vertebra    CHF (congestive heart failure)    No significant past surgical history    Below knee amputation status, left    History of right cataract extraction    History of left cataract extraction    S/P arteriovenous (AV) fistula creation    H/O hematuria    H/O transurethral destruction of bladder lesion    History of excision of mass        PLAN:  HPI:  Patient is 62 year old male from [facility], walks with RW, with PMH of ESRD on HD (TTS), BKA- L w/prosthetic leg, DM, Left eye blindness, CHF, CAD, HTN, HLD, osteoporosis, bronchitis, current smoker, and anemia who presents with complaint of missed dialysis. Last HD . Pt states he often missed HD sessions.  patient states he missed this HD session due to mild pain in left leg, patient remains able to ambulate. Pt complains of right leg swelling and states he does not make any urine. Patient states he was having mild shortness of breath.  Pt denies any fever, chills, N/V/D, constipation, CP, numbness or tingling (2023 19:20)    # MEETING HELD WITH PATIENT, BROTHER ARTEMIO @ [phone] AND SW TEAM. PATIENT W/ HISTORY OF ROUTINE NONCOMPLIANCE W/ LIFE-SUSTAINING TREATMENT [HD], EVALUATIONS AND INTERVENTIONS - COUNSELLED AT LENGTH TO REMAIN COMPLIANT. DISCUSSED THAT PROGNOSIS IS POOR/GRIM GIVEN UNDERLYING MEDICAL CONDITIONS AND GIVEN NONCOMPLIANCE. HE VERBALIZED UNDERSTANDING. REGARDING GOC - PATIENT WISHES FOR FULL CODE. PALLIATIVE CARE CONSULTED. PSYCHIATRY CONSULTED. PATIENT EXPRESSED THAT HE DOES GROW IMPATIENT DURING DIALYSIS SESSIONS AND HAS BEEN LEAVING SESSIONS SOONER THAN PRESCRIBED. IN DISCUSSION THAT RISKS OF DEFERRING COMPLETE HD - INCLUDE BUT ARE NOT LIMITED TO WORSENING CLINICAL CONDITION, ELECTROLYTE ABNORMALITIES, ARRHYTHMIA AND DEATH. HE VERBALIZED UNDERSTANDING. D/W PATIENT THAT REPEATEDLY REFUSING INTERVENTIONS AND ASSESSMENTS IS NOT IN LINE WITH HIS WISHES TO IMPROVE. PATIENT VERBALIZED UNDERSTANDING. DISCUSSED REGARDING CURRENT CLINICAL CONDITION, RECOMMENDED EVALUATIONS AND INTERVENTIONS. ALL QUESTIONS ANSWERED. TEAM HAS OFFERED PATIENT EMOTIONAL SUPPORT AND OFFERED MEDICATION FOR MOOD. OFFERED TO PRE-MEDICATE W/ ANXIOLYTIC PRIOR TO HD. PATIENT IS AGREEABLE.     DISCUSSED THAT PATIENT WILL NEED TO COMPLY WITH HD SESSIONS IN ORDER TO BE MEDICALLY OPTIMIZED FOR DISCHARGE AND FOR SAFE D/C PLANNING. TEAM DISCUSSED OPTIONS OF RETURNING TO [facility] W/ OUTPATIENT HD , IF CARE NEEDS CAN BE SAFELY MET VS. NURSING HOME/[facility] . PATIENT AND BROTHER VERBALIZED UNDERSTANDING.       # ACUTE ON CHRONIC HYPOXIC RESPIRATORY FAILURE S/T PULMONARY EDEMA - S/T INCOMPLETE HD SESSIONS ; ANASARCA  # HYPERKALEMIA  # HX OF COPD + S/P RECENT MULTIFOCAL PNEUMONIA ; R/O ASPIRATION PNEUMONIA  # PULMONARY HYPERTENSION  # UNCONTROLLED HTN  # ESRD ON HD TTS - W/ HX OF NONCOMPLIANCE    - TELEMETRY  - HYDRALAZINE, METOPROLOL AND NORVASC ; PRN IV ANTIHYPERTENSIVE  - LOKELMA  - SUPPLEMENTAL O2  - PLANNED FOR HD  - NEPHROLOGY CONSULT  - PULMONOLOGY CONSULT  - ID CONSULT    - CONTINUES TO REFUSE OR PRE-MATURELY DISCONTINUE SESSIONS OF HD       - [] - OVERNIGHT RAPID RESPONSE S/T HYPOXIA - SELF-LIMITED - PATIENT IMPROVED S/P O2 SUPPLEMENT  - S/P CEFEPIME + FLAGYLL, BCX - NGTD      # RECURRENT INTRACTABLE NAUSEA/VOMITING, ? COFFEE GROUND EMESIS  # GASTROPARESIS  # HISTORY OF ESOPHAGITIS AND DUODENITIS [2021], HX OF GASTROPARESIS W/ EVIDENCE OF THICKENED ESOPHAGUS ON PREVIOUS CT SCAN   # PANCREAS W/ DOUBLE DUCT SIGN    - MONITORING HGB, PLACED ON PPI BID , CARAFATE QID  - PRN ANTIEMETICS  ; S/P DOSE OF REGLAN [WITH MINIMAL IMPROVEMENT]  - MONITORING FOR SYMPTOMS  - WHILE ON DIET PATIENT REPEATEDLY COUNSELLED TO CHEW FOOD CAUTIOUSLY AND CONSUME SMALL BITES W/ REGULAR CLEARING WITH WATER BETWEEN BITES    - ADVANCE DIET AS TOLERATED  - NOTED CT A/P    - S/P RECENT PRBC TRANSFUSION     - GI CONSULT  - SURGERY CONSULT    # LESS LIKELY CHOLECYSTITIS  - S/P ABX  - NOTED CT A/P  - HIDA SCAN - NEGATIVE  - SURGERY CONSULT    # ELEVATED TROPONINS - ? DEMAND ISCHEMIA    - S/P TELEMETRY  - TREND TROPONINS  - ECHO - TRACE MR, MODERATELY INCREASED LV WALL THICKNESS, NORMAL LV SYSTOLIC FUNCTION, SEVERE PULMONARY HTN  - CARDIOLOGY CONSULT    # EPISODE OF HYPOGLYCEMIA - IMPROVED  # GASTROPARESIS  # UNDERLYING DM  - SSI + FS  - COUNSELLED TO COMPLY WITH HD TO REDUCE UREMIA    - REPEATEDLY COUNSELLED TO COMPLY WITH FINGERSTICKS      # DEPRESSION  - PLACED ON ZOLOFT  - PSYCHIATRY CONSULT    # PATIENT UNDERGOING OUTPATIENT WORKUP FOR CENTRAL VENOUS STENOSIS THROUGH NEPHROLOGY TEAM  - ? s/p STENT PLACEMENT    # ANEMIA OF CKD  # HX OF PANCYTOPENIA   - TREND HGB, TRANSFUSION THRESHOLD HGB < 7; PATIENT STILL INTERMITTENTLY REFUSING BLOODWORK, COUNSELLED TO COMPLY  - TYPE AND SCREEN  - ON EPO  - DENIES RECENT HEMATEMESIS, MELENA, HEMATOCHEZIA    - S/P RECENT PRBC TRANSFUSION  - S/P PRBC TRANSFUSION     - PPI BID, CARAFATE QID    # SEVERE PROTEIN CALORIE MALNUTRITION, FAILURE TO THRIVE   -  TEMPORAL WASTING, LOSS OF MUSCLE MASS FROM SHOULDER AND HIP GIRDLE  - NUTRITIONAL SUPPLEMENT    # HLD  # PARTIALLY BLIND  # S/P LEFT BKA  # HX OF PVD  # LS SPINAL STENOSIS  # GI AND DVT PPX   Patient is a 62y old  Male who presents with a chief complaint of Missed dialysis (09 Aug 2023 16:29)    PATIENT IS SEEN AND EXAMINED IN MEDICAL FLOOR.    ALLERGIES:  No Known Drug Allergies  fish (Rash)  liver (Anaphylaxis)      Daily     Daily Weight in k (09 Aug 2023 18:40)    VITALS:    Vital Signs Last 24 Hrs  T(C): 36.6 (10 Aug 2023 05:31), Max: 36.9 (09 Aug 2023 19:31)  T(F): 97.8 (10 Aug 2023 05:), Max: 98.5 (09 Aug 2023 19:31)  HR: 83 (10 Aug 2023 07:20) (83 - 105)  BP: 147/69 (10 Aug 2023 07:20) (132/66 - 172/80)  BP(mean): --  RR: 20 (10 Aug 2023 05:) (17 - 20)  SpO2: 91% (10 Aug 2023 05:) (91% - 100%)    Parameters below as of 10 Aug 2023 05:31  Patient On (Oxygen Delivery Method): room air        LABS:    CBC Full  -  ( 10 Aug 2023 06:21 )  WBC Count : 2.99 K/uL  RBC Count : 2.90 M/uL  Hemoglobin : 8.0 g/dL  Hematocrit : 26.1 %  Platelet Count - Automated : 87 K/uL  Mean Cell Volume : 90.0 fl  Mean Cell Hemoglobin : 27.6 pg  Mean Cell Hemoglobin Concentration : 30.7 gm/dL  Auto Neutrophil # : x  Auto Lymphocyte # : x  Auto Monocyte # : x  Auto Eosinophil # : x  Auto Basophil # : x  Auto Neutrophil % : x  Auto Lymphocyte % : x  Auto Monocyte % : x  Auto Eosinophil % : x  Auto Basophil % : x      08-10    141  |  102  |  46<H>  ----------------------------<  42<LL>  3.7   |  26  |  11.70<H>    Ca    7.9<L>      10 Aug 2023 06:21  Phos  4.8     08-10  Mg     2.6     08-10      CAPILLARY BLOOD GLUCOSE      POCT Blood Glucose.: 120 mg/dL (10 Aug 2023 08:41)  POCT Blood Glucose.: 34 mg/dL (10 Aug 2023 08:04)  POCT Blood Glucose.: 36 mg/dL (10 Aug 2023 08:00)  POCT Blood Glucose.: 243 mg/dL (09 Aug 2023 21:25)  POCT Blood Glucose.: 109 mg/dL (09 Aug 2023 16:02)  POCT Blood Glucose.: 143 mg/dL (09 Aug 2023 11:23)          Creatinine Trend: 11.70<--, 17.50<--, 17.40<--, 16.90<--, 15.40<--, 16.10<--  I&O's Summary          .Blood Blood-Peripheral   @ 14:07   No Growth Final  --  --          MEDICATIONS:    MEDICATIONS  (STANDING):  albuterol/ipratropium for Nebulization 3 milliLiter(s) Nebulizer every 6 hours  amLODIPine   Tablet 10 milliGRAM(s) Oral daily  aspirin enteric coated 81 milliGRAM(s) Oral daily  atorvastatin 40 milliGRAM(s) Oral at bedtime  budesonide 160 MICROgram(s)/formoterol 4.5 MICROgram(s) Inhaler 2 Puff(s) Inhalation two times a day  chlorhexidine 2% Cloths 1 Application(s) Topical daily  dextrose 5%. 1000 milliLiter(s) (100 mL/Hr) IV Continuous <Continuous>  dextrose 5%. 1000 milliLiter(s) (50 mL/Hr) IV Continuous <Continuous>  dextrose 50% Injectable 25 Gram(s) IV Push once  dextrose 50% Injectable 12.5 Gram(s) IV Push once  dextrose 50% Injectable 25 Gram(s) IV Push once  epoetin beverley-epbx (RETACRIT) Injectable 97155 Unit(s) IV Push <User Schedule>  folic acid 1 milliGRAM(s) Oral daily  furosemide   Injectable 40 milliGRAM(s) IV Push daily  glucagon  Injectable 1 milliGRAM(s) IntraMuscular once  hydrALAZINE Injectable 5 milliGRAM(s) IV Push three times a day  insulin glargine Injectable (LANTUS) 4 Unit(s) SubCutaneous at bedtime  latanoprost 0.005% Ophthalmic Solution 1 Drop(s) Both EYES at bedtime  levothyroxine 25 MICROGram(s) Oral daily  lidocaine   4% Patch 2 Patch Transdermal daily  metoprolol tartrate Injectable 5 milliGRAM(s) IV Push every 12 hours  pantoprazole  Injectable 40 milliGRAM(s) IV Push every 12 hours  sertraline 200 milliGRAM(s) Oral daily  sevelamer carbonate 800 milliGRAM(s) Oral three times a day with meals  sucralfate 1 Gram(s) Oral four times a day      MEDICATIONS  (PRN):  acetaminophen     Tablet .. 975 milliGRAM(s) Oral every 8 hours PRN Moderate Pain (4 - 6)  dextrose Oral Gel 15 Gram(s) Oral once PRN Blood Glucose LESS THAN 70 milliGRAM(s)/deciliter  LORazepam   Injectable 0.5 milliGRAM(s) IV Push every 4 hours PRN agitation and nausea  ondansetron   Disintegrating Tablet 4 milliGRAM(s) Oral two times a day PRN Nausea and/or Vomiting  ondansetron Injectable 4 milliGRAM(s) IV Push every 6 hours PRN Nausea and/or Vomiting      REVIEW OF SYSTEMS:                           ALL ROS DONE [ X   ]    CONSTITUTIONAL:  LETHARGIC [   ], FEVER [   ], UNRESPONSIVE [   ]  CVS:  CP  [   ], SOB, [   ], PALPITATIONS [   ], DIZZYNESS [   ]  RS: COUGH [   ], SPUTUM [   ]  GI: ABDOMINAL PAIN [   ], NAUSEA [   ], VOMITINGS [   ], DIARRHEA [   ], CONSTIPATION [   ]  :  DYSURIA [   ], NOCTURIA [   ], INCREASED FREQUENCY [   ], DRIBLING [   ],  SKELETAL: PAINFUL JOINTS [   ], SWOLLEN JOINTS [   ], NECK ACHE [   ], LOW BACK ACHE [   ],  SKIN : ULCERS [   ], RASH [   ], ITCHING [   ]  CNS: HEAD ACHE [   ], DOUBLE VISION [   ], BLURRED VISION [   ], AMS / CONFUSION [   ], SEIZURES [   ], WEAKNESS [   ],TINGLING / NUMBNESS [   ]    PHYSICAL EXAMINATION:  GENERAL APPEARANCE: NO DISTRESS,    ANASARCA ++  HEENT:  NO PALLOR, NO  JVD,  NO   NODES, NECK SUPPLE  CVS: S1 +, S2 +,   RS: AEEB,  OCCASIONAL  RALES +,   CRACKLES+ AT LUNG BASES  ABD: SOFT, NT, NO, BS +  EXT: LEFT BKA  SKIN: WARM,   SKELETAL:  ROM ACCEPTABLE  CNS:  AAO X  2-3      RADIOLOGY :    RADIOLOGY AND READINGS REVIEWED    ASSESSMENT :     Hypervolemia    No pertinent past medical history    Hypertension    Adrenal insufficiency    CKD (chronic kidney disease)    Anemia    Glaucoma    Coronary artery disease    HLD (hyperlipidemia)    Peripheral vascular disease    Spinal stenosis of lumbosacral region    Hyperparathyroidism    Diabetes mellitus    Diabetic neuropathy    Contracture of hand    Osteoarthritis    Osteoporosis    Vision loss of left eye    ESRD on hemodialysis    Cataract    BPH (benign prostatic hyperplasia)    UTI (urinary tract infection)    Bladder mass    H/O hematuria    Osteoporosis    Vision loss of right eye    Depression    Chronic GERD    Osteomyelitis of vertebra    CHF (congestive heart failure)    No significant past surgical history    Below knee amputation status, left    History of right cataract extraction    History of left cataract extraction    S/P arteriovenous (AV) fistula creation    H/O hematuria    H/O transurethral destruction of bladder lesion    History of excision of mass        PLAN:  HPI:  Patient is 62 year old male from [facility], walks with RW, with PMH of ESRD on HD (TTS), BKA- L w/prosthetic leg, DM, Left eye blindness, CHF, CAD, HTN, HLD, osteoporosis, bronchitis, current smoker, and anemia who presents with complaint of missed dialysis. Last HD . Pt states he often missed HD sessions.  patient states he missed this HD session due to mild pain in left leg, patient remains able to ambulate. Pt complains of right leg swelling and states he does not make any urine. Patient states he was having mild shortness of breath.  Pt denies any fever, chills, N/V/D, constipation, CP, numbness or tingling (2023 19:20)    # [8/9] MEETING HELD WITH PATIENT, BROTHER ARTEMIO @ [phone] AND SW TEAM. PATIENT W/ HISTORY OF ROUTINE NONCOMPLIANCE W/ LIFE-SUSTAINING TREATMENT [HD], EVALUATIONS AND INTERVENTIONS - COUNSELLED AT LENGTH TO REMAIN COMPLIANT. DISCUSSED THAT PROGNOSIS IS POOR/GRIM GIVEN UNDERLYING MEDICAL CONDITIONS AND GIVEN NONCOMPLIANCE. HE VERBALIZED UNDERSTANDING. REGARDING GOC - PATIENT WISHES FOR FULL CODE. PALLIATIVE CARE CONSULTED. PSYCHIATRY CONSULTED. PATIENT EXPRESSED THAT HE DOES GROW IMPATIENT DURING DIALYSIS SESSIONS AND HAS BEEN LEAVING SESSIONS SOONER THAN PRESCRIBED. IN DISCUSSION THAT RISKS OF DEFERRING COMPLETE HD - INCLUDE BUT ARE NOT LIMITED TO WORSENING CLINICAL CONDITION, ELECTROLYTE ABNORMALITIES, ARRHYTHMIA AND DEATH. HE VERBALIZED UNDERSTANDING. D/W PATIENT THAT REPEATEDLY REFUSING INTERVENTIONS AND ASSESSMENTS IS NOT IN LINE WITH HIS WISHES TO IMPROVE. PATIENT VERBALIZED UNDERSTANDING. DISCUSSED REGARDING CURRENT CLINICAL CONDITION, RECOMMENDED EVALUATIONS AND INTERVENTIONS. ALL QUESTIONS ANSWERED. TEAM HAS OFFERED PATIENT EMOTIONAL SUPPORT AND OFFERED MEDICATION FOR MOOD. OFFERED TO PRE-MEDICATE W/ ANXIOLYTIC PRIOR TO HD. PATIENT IS AGREEABLE.     DISCUSSED THAT PATIENT WILL NEED TO COMPLY WITH HD SESSIONS IN ORDER TO BE MEDICALLY OPTIMIZED FOR DISCHARGE AND FOR SAFE D/C PLANNING. TEAM DISCUSSED OPTIONS OF RETURNING TO [facility] W/ OUTPATIENT HD , IF CARE NEEDS CAN BE SAFELY MET VS. NURSING HOME/[facility] . PATIENT AND BROTHER VERBALIZED UNDERSTANDING.       # ACUTE ON CHRONIC HYPOXIC RESPIRATORY FAILURE S/T PULMONARY EDEMA - S/T INCOMPLETE HD SESSIONS ; ANASARCA  # HYPERKALEMIA  # HX OF COPD + S/P RECENT MULTIFOCAL PNEUMONIA ; R/O ASPIRATION PNEUMONIA  # PULMONARY HYPERTENSION  # UNCONTROLLED HTN  # ESRD ON HD TTS - W/ HX OF NONCOMPLIANCE    - TELEMETRY  - HYDRALAZINE, METOPROLOL AND NORVASC ; PRN IV ANTIHYPERTENSIVE  - LOKELMA  - SUPPLEMENTAL O2  - PLANNED FOR HD  - NEPHROLOGY CONSULT  - PULMONOLOGY CONSULT  - ID CONSULT    - CONTINUES TO REFUSE OR PRE-MATURELY DISCONTINUE SESSIONS OF HD       - [] - OVERNIGHT RAPID RESPONSE S/T HYPOXIA - SELF-LIMITED - PATIENT IMPROVED S/P O2 SUPPLEMENT  - S/P CEFEPIME + FLAGYLL, BCX - NGTD      # RECURRENT INTRACTABLE NAUSEA/VOMITING, ? COFFEE GROUND EMESIS  # GASTROPARESIS  # HISTORY OF ESOPHAGITIS AND DUODENITIS [2021], HX OF GASTROPARESIS W/ EVIDENCE OF THICKENED ESOPHAGUS ON PREVIOUS CT SCAN   # PANCREAS W/ DOUBLE DUCT SIGN    - MONITORING HGB, PLACED ON PPI BID , CARAFATE QID  - PRN ANTIEMETICS  ; S/P DOSE OF REGLAN [WITH MINIMAL IMPROVEMENT]  - MONITORING FOR SYMPTOMS  - WHILE ON DIET PATIENT REPEATEDLY COUNSELLED TO CHEW FOOD CAUTIOUSLY AND CONSUME SMALL BITES W/ REGULAR CLEARING WITH WATER BETWEEN BITES    - ADVANCE DIET AS TOLERATED  - NOTED CT A/P    - S/P RECENT PRBC TRANSFUSION     - GI CONSULT  - SURGERY CONSULT    # LESS LIKELY CHOLECYSTITIS  - S/P ABX  - NOTED CT A/P  - HIDA SCAN - NEGATIVE  - SURGERY CONSULT    # ELEVATED TROPONINS - ? DEMAND ISCHEMIA    - S/P TELEMETRY  - TREND TROPONINS  - ECHO - TRACE MR, MODERATELY INCREASED LV WALL THICKNESS, NORMAL LV SYSTOLIC FUNCTION, SEVERE PULMONARY HTN  - CARDIOLOGY CONSULT    # EPISODE OF HYPOGLYCEMIA - IMPROVED  # GASTROPARESIS  # UNDERLYING DM  - SSI + FS  - COUNSELLED TO COMPLY WITH HD TO REDUCE UREMIA    - REPEATEDLY COUNSELLED TO COMPLY WITH FINGERSTICKS      # DEPRESSION  - PLACED ON ZOLOFT  - PSYCHIATRY CONSULT    # PATIENT UNDERGOING OUTPATIENT WORKUP FOR CENTRAL VENOUS STENOSIS THROUGH NEPHROLOGY TEAM  - ? s/p STENT PLACEMENT    # ANEMIA OF CKD  # HX OF PANCYTOPENIA   - TREND HGB, TRANSFUSION THRESHOLD HGB < 7; PATIENT STILL INTERMITTENTLY REFUSING BLOODWORK, COUNSELLED TO COMPLY  - TYPE AND SCREEN  - ON EPO  - DENIES RECENT HEMATEMESIS, MELENA, HEMATOCHEZIA    - S/P RECENT PRBC TRANSFUSION  - S/P PRBC TRANSFUSION     - PPI BID, CARAFATE QID    # SEVERE PROTEIN CALORIE MALNUTRITION, FAILURE TO THRIVE   -  TEMPORAL WASTING, LOSS OF MUSCLE MASS FROM SHOULDER AND HIP GIRDLE  - NUTRITIONAL SUPPLEMENT    # HLD  # PARTIALLY BLIND  # S/P LEFT BKA  # HX OF PVD  # LS SPINAL STENOSIS  # GI AND DVT PPX

## 2023-08-10 NOTE — PROGRESS NOTE ADULT - ASSESSMENT
62 year old male from [facility], walks with RW, with PMH of ESRD on HD (TTS), BKA- L w/prosthetic leg, DM, Left eye blindness, CHF, CAD, HTN, HLD, osteoporosis, bronchitis, current smoker, and anemia who presents with complaint of missed dialysis and does not make any urine. Patient states he was having shortness of breath. Patient admitted to telemetry for Acute Hyperkalemia 2/2 missed dialysis found to have pulmonary edema and BNP [id number]. Cardiology and nephro following.     NP informed on rounds after discussion that pt requires 4 HD sessions prior to dc.  Pt completed 1/4 sessions on 8/10.   62 year old male from [facility], walks with RW, with PMH of ESRD on HD (TTS), BKA- L w/prosthetic leg, DM, Left eye blindness, CHF, CAD, HTN, HLD, osteoporosis, bronchitis, current smoker, and anemia who presents with complaint of missed dialysis and does not make any urine. Patient states he was having shortness of breath. Patient admitted to telemetry for Acute Hyperkalemia 2/2 missed dialysis found to have pulmonary edema and BNP [id number]. Cardiology and nephro following.     NP informed on rounds after discussion that pt requires 4 HD sessions prior to dc.  Pt completed 1/4 sessions a/o 8/10.

## 2023-08-10 NOTE — PROGRESS NOTE ADULT - SUBJECTIVE AND OBJECTIVE BOX
[facility] : Progress Note :: [phone], (office [phone]),   Dr Mosher / Dr Washington / Dr Yonug / Dr Doll / Dr Varsha TURCIOS / Dr Tello / Dr Garcia / Dr Larry qiu  _____________________________________________________________________________________________  patient had ~ 2.5 hrs HD yesterday  feels better.    No Known Drug Allergies  fish (Rash)  liver (Anaphylaxis)    Hospital Medications:   MEDICATIONS  (STANDING):  albuterol/ipratropium for Nebulization 3 milliLiter(s) Nebulizer every 6 hours  amLODIPine   Tablet 10 milliGRAM(s) Oral daily  aspirin enteric coated 81 milliGRAM(s) Oral daily  atorvastatin 40 milliGRAM(s) Oral at bedtime  budesonide 160 MICROgram(s)/formoterol 4.5 MICROgram(s) Inhaler 2 Puff(s) Inhalation two times a day  chlorhexidine 2% Cloths 1 Application(s) Topical daily  dextrose 5%. 1000 milliLiter(s) (50 mL/Hr) IV Continuous <Continuous>  dextrose 5%. 1000 milliLiter(s) (100 mL/Hr) IV Continuous <Continuous>  dextrose 50% Injectable 25 Gram(s) IV Push once  dextrose 50% Injectable 25 Gram(s) IV Push once  dextrose 50% Injectable 12.5 Gram(s) IV Push once  epoetin beverley-epbx (RETACRIT) Injectable 40916 Unit(s) IV Push <User Schedule>  folic acid 1 milliGRAM(s) Oral daily  furosemide   Injectable 40 milliGRAM(s) IV Push daily  glucagon  Injectable 1 milliGRAM(s) IntraMuscular once  hydrALAZINE Injectable 5 milliGRAM(s) IV Push three times a day  insulin glargine Injectable (LANTUS) 4 Unit(s) SubCutaneous at bedtime  latanoprost 0.005% Ophthalmic Solution 1 Drop(s) Both EYES at bedtime  levothyroxine 25 MICROGram(s) Oral daily  lidocaine   4% Patch 2 Patch Transdermal daily  metoprolol tartrate Injectable 5 milliGRAM(s) IV Push every 12 hours  pantoprazole  Injectable 40 milliGRAM(s) IV Push every 12 hours  sertraline 200 milliGRAM(s) Oral daily  sevelamer carbonate 800 milliGRAM(s) Oral three times a day with meals  sucralfate 1 Gram(s) Oral four times a day      VITALS:  T(F): 98.5 (08-10-23 @ 12:46), Max: 98.5 (08-09-23 @ 19:31)  HR: 93 (08-10-23 @ 13:45)  BP: 134/66 (08-10-23 @ 13:45)  RR: 18 (08-10-23 @ 12:46)  SpO2: 95% (08-10-23 @ 12:46)  Wt(kg): --      PHYSICAL EXAM:  Constitutional: NAD  HEENT: anicteric sclera, oropharynx clear.  Neck: No JVD  Respiratory: CTAB, no wheezes, rales or rhonchi  Cardiovascular: S1, S2, RRR  Gastrointestinal: BS+, soft, NT/ND  Extremities: No peripheral edema  Neurological: A/O x 3, no focal deficits  Vascular Access: AVF with thrill and bruit     LABS:  08-10    141  |  102  |  46<H>  ----------------------------<  42<LL>  3.7   |  26  |  11.70<H>    Ca    7.9<L>      10 Aug 2023 06:21  Phos  4.8     08-10  Mg     2.6     08-10      Creatinine Trend: 11.70 <--, 17.50 <--, 17.40 <--, 16.90 <--, 15.40 <--                        8.0    2.99  )-----------( 87       ( 10 Aug 2023 06:21 )             26.1     Urine Studies:  Urinalysis Basic - ( 10 Aug 2023 06:21 )    Color:  / Appearance:  / SG:  / pH:   Gluc: 42 mg/dL / Ketone:   / Bili:  / Urobili:    Blood:  / Protein:  / Nitrite:    Leuk Esterase:  / RBC:  / WBC    Sq Epi:  / Non Sq Epi:  / Bacteria:         RADIOLOGY & ADDITIONAL STUDIES:

## 2023-08-10 NOTE — PROGRESS NOTE ADULT - PROBLEM SELECTOR PLAN 6
- P/w RUQ abdominal pain and vomiting  - S/p CT A/P showed small cholelithiasis; Nonspecific trace pericholecystic fluid; Stable dilated  common bile duct and main pancreatic duct; Nonspecific mild distal esophageal mural thickening; Small ascites; Anasarca; Small bilateral pleural effusions; Small bilateral atelectasis; Nonspecific ground glass densities in the right lower lung zone; Cardiomegaly and mild pericardial effusion, unchanged.  - S/p HIDA negative for acute cholecystitis  - C/w Pain mgmt   - GI-Dr. Lee consulted-No planned intervention

## 2023-08-10 NOTE — PROGRESS NOTE ADULT - SUBJECTIVE AND OBJECTIVE BOX
DATE OF SERVICE: 08-10-23    Patient denies chest pain or shortness of breath.   Review of symptoms otherwise negative.    acetaminophen     Tablet .. 975 milliGRAM(s) Oral every 8 hours PRN  albuterol/ipratropium for Nebulization 3 milliLiter(s) Nebulizer every 6 hours  amLODIPine   Tablet 10 milliGRAM(s) Oral daily  aspirin enteric coated 81 milliGRAM(s) Oral daily  atorvastatin 40 milliGRAM(s) Oral at bedtime  budesonide 160 MICROgram(s)/formoterol 4.5 MICROgram(s) Inhaler 2 Puff(s) Inhalation two times a day  chlorhexidine 2% Cloths 1 Application(s) Topical daily  dextrose 5%. 1000 milliLiter(s) IV Continuous <Continuous>  dextrose 5%. 1000 milliLiter(s) IV Continuous <Continuous>  dextrose 50% Injectable 25 Gram(s) IV Push once  dextrose 50% Injectable 25 Gram(s) IV Push once  dextrose 50% Injectable 12.5 Gram(s) IV Push once  dextrose Oral Gel 15 Gram(s) Oral once PRN  epoetin beverley-epbx (RETACRIT) Injectable 79230 Unit(s) IV Push <User Schedule>  folic acid 1 milliGRAM(s) Oral daily  furosemide   Injectable 40 milliGRAM(s) IV Push daily  glucagon  Injectable 1 milliGRAM(s) IntraMuscular once  hydrALAZINE Injectable 5 milliGRAM(s) IV Push three times a day  insulin glargine Injectable (LANTUS) 4 Unit(s) SubCutaneous at bedtime  latanoprost 0.005% Ophthalmic Solution 1 Drop(s) Both EYES at bedtime  levothyroxine 25 MICROGram(s) Oral daily  lidocaine   4% Patch 2 Patch Transdermal daily  LORazepam   Injectable 0.5 milliGRAM(s) IV Push every 4 hours PRN  metoprolol tartrate Injectable 5 milliGRAM(s) IV Push every 12 hours  ondansetron   Disintegrating Tablet 4 milliGRAM(s) Oral two times a day PRN  ondansetron Injectable 4 milliGRAM(s) IV Push every 6 hours PRN  pantoprazole  Injectable 40 milliGRAM(s) IV Push every 12 hours  sertraline 200 milliGRAM(s) Oral daily  sevelamer carbonate 800 milliGRAM(s) Oral three times a day with meals  sucralfate 1 Gram(s) Oral four times a day                            8.0    2.99  )-----------( 87       ( 10 Aug 2023 06:21 )             26.1       Hemoglobin: 8.0 g/dL (08-10 @ 06:21)  Hemoglobin: 7.6 g/dL (08-09 @ 05:50)  Hemoglobin: 7.1 g/dL (08-08 @ 07:05)  Hemoglobin: 7.2 g/dL (08-05 @ 12:21)      08-10    141  |  102  |  46<H>  ----------------------------<  42<LL>  3.7   |  26  |  11.70<H>    Ca    7.9<L>      10 Aug 2023 06:21  Phos  4.8     08-10  Mg     2.6     08-10      Creatinine Trend: 11.70<--, 17.50<--, 17.40<--, 16.90<--, 15.40<--, 16.10<--    COAGS:           T(C): 36.6 (08-10-23 @ 05:31), Max: 36.9 (08-09-23 @ 19:31)  HR: 83 (08-10-23 @ 07:20) (83 - 105)  BP: 147/69 (08-10-23 @ 07:20) (132/66 - 172/80)  RR: 20 (08-10-23 @ 05:31) (17 - 20)  SpO2: 91% (08-10-23 @ 05:31) (91% - 100%)  Wt(kg): --    I&O's Summary      HEENT:  (-)icterus (-)pallor  CV: N S1 S2 1/6 DIANA (+)2 Pulses B/l  Resp:  Clear to ausculatation B/L, normal effort  GI: (+) BS Soft, NT, ND  Lymph:  (-)Edema, (-)obvious lymphadenopathy  Skin: Warm to touch, Normal turgor  Psych: Appropriate mood and affect         ASSESSMENT/PLAN: 	62y Male  Mateus Murray, walks with RW, with PMH of ESRD on HD (TTS), BKA- L w/prosthetic leg, DM, Left eye blindness, CHF,, HTN, HLD, EF 45-50%, normal perfusion 4/23 osteoporosis, bronchitis, current smoker, and anemia who presents with complaint of missed dialysis.     # CHF  - Due to missed HD  - HD per renal    # Positive trop  - nothing to suggest ACS.  His trop has been higher in the past and demonstrated normal perfusion on Stress 4/23  - ECHO noted, normal LV fx severe pulm HTN, cont to keep net negative     # Noncompliance  - Behavioral health f/u  - refusing meds and blood tests as well as HD at times     # Smoking  - Cessation stressed    # HTN  - Suspect BP will improve once euvolemic         Dominic Still MD, [facility]  [place] [phone]

## 2023-08-10 NOTE — CHART NOTE - NSCHARTNOTEFT_GEN_A_CORE
Assessment:   62yMalePatient is a 62y old  Male who presents with a chief complaint of Missed dialysis (10 Aug 2023 10:09). Pt visited.  Pt is alert but confused. Pt is on Enhanced supervision. D/W Nursing. Pt C/O Vomiting x 2 today.  On Zofran .  Pt with Gastroparesis / D/W RN.  Pt with Fair- poor Appetite. Food choices updated.   F & N dept Notified. Pt likes strawberry flavour Supplement will suggest Nepro BID. Nephro notes Noted.  Poor Prognosis       Factors impacting intake: [ ] none [ x] nausea  [ x] vomiting [ ] diarrhea [ ] constipation  [ ]chewing problems [ ] swallowing issues  [ ] other:     Diet Prescription: Diet, DASH/ TLC:   Sodium & Cholesterol Restricted  Consistent Carbohydrate {Evening Snacks}  For patients receiving Renal Replacement - No Protein Restr, No Conc K, No Conc Phos, Low Sodium (RENAL) (08-08-23 @ 12:57)    Intake: ~  0- 50 % of meal    Current Weight:   % Weight Change    Pertinent Medications: MEDICATIONS  (STANDING):  albuterol/ipratropium for Nebulization 3 milliLiter(s) Nebulizer every 6 hours  amLODIPine   Tablet 10 milliGRAM(s) Oral daily  aspirin enteric coated 81 milliGRAM(s) Oral daily  atorvastatin 40 milliGRAM(s) Oral at bedtime  budesonide 160 MICROgram(s)/formoterol 4.5 MICROgram(s) Inhaler 2 Puff(s) Inhalation two times a day  chlorhexidine 2% Cloths 1 Application(s) Topical daily  dextrose 5%. 1000 milliLiter(s) (100 mL/Hr) IV Continuous <Continuous>  dextrose 5%. 1000 milliLiter(s) (50 mL/Hr) IV Continuous <Continuous>  dextrose 50% Injectable 25 Gram(s) IV Push once  dextrose 50% Injectable 12.5 Gram(s) IV Push once  dextrose 50% Injectable 25 Gram(s) IV Push once  epoetin beverley-epbx (RETACRIT) Injectable 04823 Unit(s) IV Push <User Schedule>  folic acid 1 milliGRAM(s) Oral daily  furosemide   Injectable 40 milliGRAM(s) IV Push daily  glucagon  Injectable 1 milliGRAM(s) IntraMuscular once  hydrALAZINE Injectable 5 milliGRAM(s) IV Push three times a day  insulin glargine Injectable (LANTUS) 4 Unit(s) SubCutaneous at bedtime  latanoprost 0.005% Ophthalmic Solution 1 Drop(s) Both EYES at bedtime  levothyroxine 25 MICROGram(s) Oral daily  lidocaine   4% Patch 2 Patch Transdermal daily  metoprolol tartrate Injectable 5 milliGRAM(s) IV Push every 12 hours  pantoprazole  Injectable 40 milliGRAM(s) IV Push every 12 hours  sertraline 200 milliGRAM(s) Oral daily  sevelamer carbonate 800 milliGRAM(s) Oral three times a day with meals  sucralfate 1 Gram(s) Oral four times a day    MEDICATIONS  (PRN):  acetaminophen     Tablet .. 975 milliGRAM(s) Oral every 8 hours PRN Moderate Pain (4 - 6)  dextrose Oral Gel 15 Gram(s) Oral once PRN Blood Glucose LESS THAN 70 milliGRAM(s)/deciliter  LORazepam   Injectable 0.5 milliGRAM(s) IV Push every 4 hours PRN agitation and nausea  ondansetron   Disintegrating Tablet 4 milliGRAM(s) Oral two times a day PRN Nausea and/or Vomiting  ondansetron Injectable 4 milliGRAM(s) IV Push every 6 hours PRN Nausea and/or Vomiting    Pertinent Labs: 08-10 Na141 mmol/L Glu 42 mg/dL<LL> K+ 3.7 mmol/L Cr  11.70 mg/dL<H> BUN 46 mg/dL<H> 08-10 Phos 4.8 mg/dL<H>     CAPILLARY BLOOD GLUCOSE      POCT Blood Glucose.: 120 mg/dL (10 Aug 2023 08:41)  POCT Blood Glucose.: 34 mg/dL (10 Aug 2023 08:04)  POCT Blood Glucose.: 36 mg/dL (10 Aug 2023 08:00)  POCT Blood Glucose.: 243 mg/dL (09 Aug 2023 21:25)  POCT Blood Glucose.: 109 mg/dL (09 Aug 2023 16:02)  POCT Blood Glucose.: 143 mg/dL (09 Aug 2023 11:23)    Skin:     Estimated Needs:   [ ] no change since previous assessment  [ ] recalculated:     Previous Nutrition Diagnosis:   [ ] Inadequate Energy Intake [x ]Inadequate Oral Intake [ ] Excessive Energy Intake   [ ] Underweight [ ] Increased Nutrient Needs [ ] Overweight/Obesity   [ ] Altered GI Function [ ] Unintended Weight Loss [ ] Food & Nutrition Related Knowledge Deficit [ ] Malnutrition     Nutrition Diagnosis is [x ] ongoing  [ ] resolved [ ] not applicable     New Nutrition Diagnosis: [ ] not applicable       Interventions:   Recommend  [ ] Change Diet To:  [ x] Nutrition Supplement Nepro BID.  [ ] Nutrition Support  [ x] Other:  Provide food of choice within diet  Restriction.    Monitoring and Evaluation:   [ ] PO intake [ x ] Tolerance to diet prescription [ x ] weights [ x ] labs[ x ] follow up per protocol  [ ] other:

## 2023-08-10 NOTE — PROGRESS NOTE ADULT - ASSESSMENT
# ESRD. had 2.5 hrs of HD yesterday  agree with increasing pre-HD ativan dose to 2 mg  HD in AM   D/W pt compliance with HD as prescribed.  # anemia of CKD. epogen on HD. transfuse for HBG <7  #CKDMBD. phos at goal

## 2023-08-10 NOTE — PROGRESS NOTE ADULT - SUBJECTIVE AND OBJECTIVE BOX
Time of Visit:  Patient seen and examined.     MEDICATIONS  (STANDING):  albuterol/ipratropium for Nebulization 3 milliLiter(s) Nebulizer every 6 hours  amLODIPine   Tablet 10 milliGRAM(s) Oral daily  aspirin enteric coated 81 milliGRAM(s) Oral daily  atorvastatin 40 milliGRAM(s) Oral at bedtime  budesonide 160 MICROgram(s)/formoterol 4.5 MICROgram(s) Inhaler 2 Puff(s) Inhalation two times a day  chlorhexidine 2% Cloths 1 Application(s) Topical daily  dextrose 5%. 1000 milliLiter(s) (50 mL/Hr) IV Continuous <Continuous>  dextrose 5%. 1000 milliLiter(s) (100 mL/Hr) IV Continuous <Continuous>  dextrose 50% Injectable 25 Gram(s) IV Push once  dextrose 50% Injectable 25 Gram(s) IV Push once  dextrose 50% Injectable 12.5 Gram(s) IV Push once  epoetin beverley-epbx (RETACRIT) Injectable 61277 Unit(s) IV Push <User Schedule>  folic acid 1 milliGRAM(s) Oral daily  furosemide   Injectable 40 milliGRAM(s) IV Push daily  glucagon  Injectable 1 milliGRAM(s) IntraMuscular once  hydrALAZINE Injectable 5 milliGRAM(s) IV Push three times a day  insulin glargine Injectable (LANTUS) 4 Unit(s) SubCutaneous at bedtime  latanoprost 0.005% Ophthalmic Solution 1 Drop(s) Both EYES at bedtime  levothyroxine 25 MICROGram(s) Oral daily  lidocaine   4% Patch 2 Patch Transdermal daily  metoprolol tartrate Injectable 5 milliGRAM(s) IV Push every 12 hours  pantoprazole  Injectable 40 milliGRAM(s) IV Push every 12 hours  sertraline 200 milliGRAM(s) Oral daily  sevelamer carbonate 800 milliGRAM(s) Oral three times a day with meals  sucralfate 1 Gram(s) Oral four times a day      MEDICATIONS  (PRN):  acetaminophen     Tablet .. 975 milliGRAM(s) Oral every 8 hours PRN Moderate Pain (4 - 6)  dextrose Oral Gel 15 Gram(s) Oral once PRN Blood Glucose LESS THAN 70 milliGRAM(s)/deciliter  LORazepam   Injectable 0.5 milliGRAM(s) IV Push every 4 hours PRN agitation and nausea  ondansetron   Disintegrating Tablet 4 milliGRAM(s) Oral two times a day PRN Nausea and/or Vomiting  ondansetron Injectable 4 milliGRAM(s) IV Push every 6 hours PRN Nausea and/or Vomiting       Medications up to date at time of exam.      PHYSICAL EXAMINATION:    Vital Signs Last 24 Hrs  T(C): 36.9 (10 Aug 2023 12:46), Max: 36.9 (09 Aug 2023 19:31)  T(F): 98.5 (10 Aug 2023 12:46), Max: 98.5 (09 Aug 2023 19:31)  HR: 93 (10 Aug 2023 13:45) (83 - 105)  BP: 134/66 (10 Aug 2023 13:45) (133/65 - 172/80)  BP(mean): --  RR: 18 (10 Aug 2023 12:46) (17 - 20)  SpO2: 95% (10 Aug 2023 12:46) (91% - 100%)    Parameters below as of 10 Aug 2023 12:46  Patient On (Oxygen Delivery Method): room air      General : Alert and oriented. Fair historian. No acute distress.     HEENT: Head is normocephalic and atraumatic. No nasal tenderness. Extraocular muscles are intact. Mucous membranes are moist.     NECK: Supple, no palpable adenopathy.    LUNGS: Clear to auscultation bilaterally with no wheezing, rales, or rhonchi. No use of accessory muscle.     HEART: S1 S2 Regular rate and no click/ rub.     ABDOMEN: Soft, nontender, and nondistended. Active bowel sounds.     EXTREMITIES: Without any cyanosis, clubbing, rash, lesions .    NEUROLOGIC: Awake, alert, oriented.     SKIN: Warm and moist . Non diaphoretic.       LABS:                        8.0    2.99  )-----------( 87       ( 10 Aug 2023 06:21 )             26.1     08-10    141  |  102  |  46<H>  ----------------------------<  42<LL>  3.7   |  26  |  11.70<H>    Ca    7.9<L>      10 Aug 2023 06:21  Phos  4.8     08-10  Mg     2.6     08-10        Urinalysis Basic - ( 10 Aug 2023 06:21 )    Color: x / Appearance: x / SG: x / pH: x  Gluc: 42 mg/dL / Ketone: x  / Bili: x / Urobili: x   Blood: x / Protein: x / Nitrite: x   Leuk Esterase: x / RBC: x / WBC x   Sq Epi: x / Non Sq Epi: x / Bacteria: x                      MICROBIOLOGY: (if applicable)    RADIOLOGY & ADDITIONAL STUDIES:  EKG:   CXR:  ECHO:    IMPRESSION: 62y Male 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      Osteoarthritis      Vision loss of left eye  blind      ESRD on hemodialysis  T/Th/S      Cataract  both eyes - hx of sx done      BPH (benign prostatic hyperplasia)      UTI (urinary tract infection)  hx of      Bladder mass  hx of      H/O hematuria      Osteoporosis      Vision loss of right eye  decreased      Depression      Chronic GERD      Osteomyelitis of vertebra      CHF (congestive heart failure)      Below knee amputation status, left  2012- pt is wearing prostesis      History of right cataract extraction      History of left cataract extraction      S/P arteriovenous (AV) fistula creation  right arm brachiocephalic arteriovenous fistula on 11/08/2018      H/O hematuria  s/p bladder bx and fulguration 2/25/2020      H/O transurethral destruction of bladder lesion  2020      History of excision of mass  back mass on 03/31/2021     Impression: This is a 61 Y/O Male from [facility] with ESRD on HD ( T-Th-Sat ). Current smoker . Presented to ED due to Missed Dialysis, last HD 07-22-23 . Per Patient stated that he often missed HD sessions due to Mild left leg pain and right leg swelling . S/p RRT for SOB with Hypoxia due to Acute Hypoxic Respiratory Failure secondary to Pulmonary edema/ Fluid overload due to Missed Dialysis . CT Abdomen with Small Bilateral Pleural Effusions, Rt Lower Lung with groundglass opacity. No bowel obstruction. Small Gall Stone.   Vomiting due to diabetic gastroparesis vs acute cholecystitis  unlikely SBO .       Suggestions:  O2 saturation 95% room air. So far saturating good room air.    Continue Duoneb via nebulization Q 6 Hours .   Continue Symbicort 160-4.5 mcg 2 Puffs Twice Daily.   On lasix 40 mg IVP Daily .  Reinforced the importance of compliance to Dialysis schedule.   Aspiration precaution with HOB elevation especially during meal times.

## 2023-08-10 NOTE — PROGRESS NOTE ADULT - PROBLEM SELECTOR PLAN 4
- P/w intractable vomiting and coffee ground emesis  - S/p CT A/P negative   - C/w Protonix   - C/w Zofran PRN  - Continue to monitor CBC in AM-f/u results   - Sx-Dr. Quan consulted-No planned intervention   - GI-Dr. Lee consulted-No planned intervention

## 2023-08-10 NOTE — PROGRESS NOTE ADULT - PROBLEM SELECTOR PLAN 3
H/o ESRD on HD (T/Th/Sat)  - Pt refused HD on 8/5.  Completed 8 minutes on HD on 8/8.  Completed 2.5h HD on 8/9.    - Last HD 8/9.    - Nephro-Dr. Mosher, following

## 2023-08-10 NOTE — PROGRESS NOTE ADULT - SUBJECTIVE AND OBJECTIVE BOX
NP Note discussed with  primary attending    Patient is a 62y old  Male who presents with a chief complaint of Missed dialysis (10 Aug 2023 10:49)      INTERVAL HPI/OVERNIGHT EVENTS: Pt denies pain.  Asked when he could go home.  NP informed pt that it was discussed w/ him that he requires more dialysis before he can be discharge.  Pt said, "oh."  No other questions for concerns.      MEDICATIONS  (STANDING):  albuterol/ipratropium for Nebulization 3 milliLiter(s) Nebulizer every 6 hours  amLODIPine   Tablet 10 milliGRAM(s) Oral daily  aspirin enteric coated 81 milliGRAM(s) Oral daily  atorvastatin 40 milliGRAM(s) Oral at bedtime  budesonide 160 MICROgram(s)/formoterol 4.5 MICROgram(s) Inhaler 2 Puff(s) Inhalation two times a day  chlorhexidine 2% Cloths 1 Application(s) Topical daily  dextrose 5%. 1000 milliLiter(s) (50 mL/Hr) IV Continuous <Continuous>  dextrose 5%. 1000 milliLiter(s) (100 mL/Hr) IV Continuous <Continuous>  dextrose 50% Injectable 25 Gram(s) IV Push once  dextrose 50% Injectable 25 Gram(s) IV Push once  dextrose 50% Injectable 12.5 Gram(s) IV Push once  epoetin beverley-epbx (RETACRIT) Injectable 32288 Unit(s) IV Push <User Schedule>  folic acid 1 milliGRAM(s) Oral daily  furosemide   Injectable 40 milliGRAM(s) IV Push daily  glucagon  Injectable 1 milliGRAM(s) IntraMuscular once  hydrALAZINE Injectable 5 milliGRAM(s) IV Push three times a day  insulin glargine Injectable (LANTUS) 4 Unit(s) SubCutaneous at bedtime  latanoprost 0.005% Ophthalmic Solution 1 Drop(s) Both EYES at bedtime  levothyroxine 25 MICROGram(s) Oral daily  lidocaine   4% Patch 2 Patch Transdermal daily  metoprolol tartrate Injectable 5 milliGRAM(s) IV Push every 12 hours  pantoprazole  Injectable 40 milliGRAM(s) IV Push every 12 hours  sertraline 200 milliGRAM(s) Oral daily  sevelamer carbonate 800 milliGRAM(s) Oral three times a day with meals  sucralfate 1 Gram(s) Oral four times a day    MEDICATIONS  (PRN):  acetaminophen     Tablet .. 975 milliGRAM(s) Oral every 8 hours PRN Moderate Pain (4 - 6)  dextrose Oral Gel 15 Gram(s) Oral once PRN Blood Glucose LESS THAN 70 milliGRAM(s)/deciliter  LORazepam   Injectable 0.5 milliGRAM(s) IV Push every 4 hours PRN agitation and nausea  ondansetron   Disintegrating Tablet 4 milliGRAM(s) Oral two times a day PRN Nausea and/or Vomiting  ondansetron Injectable 4 milliGRAM(s) IV Push every 6 hours PRN Nausea and/or Vomiting      __________________________________________________  REVIEW OF SYSTEMS:    CONSTITUTIONAL: No fever  EYES: No acute visual disturbances  NECK: No pain or stiffness  RESPIRATORY: No cough; No shortness of breath  CARDIOVASCULAR: No chest pain, no palpitations  GASTROINTESTINAL: No pain. No nausea or vomiting.  No diarrhea   NEUROLOGICAL: No headache or numbness, no tremors  MUSCULOSKELETAL: No joint pain, no muscle pain  GENITOURINARY: No dysuria, no frequency, no hesitancy  PSYCHIATRY: No depression , no anxiety  ALL OTHER  ROS negative        Vital Signs Last 24 Hrs  T(C): 36.6 (10 Aug 2023 05:31), Max: 36.9 (09 Aug 2023 19:31)  T(F): 97.8 (10 Aug 2023 05:31), Max: 98.5 (09 Aug 2023 19:31)  HR: 83 (10 Aug 2023 07:20) (83 - 105)  BP: 147/69 (10 Aug 2023 07:20) (132/66 - 172/80)  RR: 20 (10 Aug 2023 05:31) (17 - 20)  SpO2: 91% (10 Aug 2023 05:31) (91% - 100%)    Parameters below as of 10 Aug 2023 05:31  Patient On (Oxygen Delivery Method): room air        ________________________________________________  PHYSICAL EXAM:  GENERAL: NAD  HEENT: Normocephalic;  conjunctivae and sclerae clear; moist mucous membranes   NECK : Supple  CHEST/LUNG: Clear to auscultation bilaterally with good air entry   HEART: S1 S2  regular; no murmurs, gallops or rubs  ABDOMEN: Soft, Nontender, Nondistended; Bowel sounds present x 4 quad  EXTREMITIES: No cyanosis; no edema; no calf tenderness.  (+) R. AVF.  (+) L. BKA.  SKIN: Warm and dry; no rash  NERVOUS SYSTEM:  Awake and alert; Oriented to place, person and time; no new deficits  _________________________________________________  LABS:                        8.0    2.99  )-----------( 87       ( 10 Aug 2023 06:21 )             26.1     08-10    141  |  102  |  46<H>  ----------------------------<  42<LL>  3.7   |  26  |  11.70<H>    Ca    7.9<L>      10 Aug 2023 06:21  Phos  4.8     08-10  Mg     2.6     08-10        Urinalysis Basic - ( 10 Aug 2023 06:21 )    Color: x / Appearance: x / SG: x / pH: x  Gluc: 42 mg/dL / Ketone: x  / Bili: x / Urobili: x   Blood: x / Protein: x / Nitrite: x   Leuk Esterase: x / RBC: x / WBC x   Sq Epi: x / Non Sq Epi: x / Bacteria: x      CAPILLARY BLOOD GLUCOSE      POCT Blood Glucose.: 120 mg/dL (10 Aug 2023 08:41)  POCT Blood Glucose.: 34 mg/dL (10 Aug 2023 08:04)  POCT Blood Glucose.: 36 mg/dL (10 Aug 2023 08:00)  POCT Blood Glucose.: 243 mg/dL (09 Aug 2023 21:25)  POCT Blood Glucose.: 109 mg/dL (09 Aug 2023 16:02)        RADIOLOGY & ADDITIONAL TESTS:    Imaging Personally Reviewed:  YES    Consultant(s) Notes Reviewed:   YES    Care Discussed with Consultants :     Plan of care was discussed with patient and /or primary care giver; all questions and concerns were addressed and care was aligned with patient's wishes.

## 2023-08-11 NOTE — PROGRESS NOTE ADULT - SUBJECTIVE AND OBJECTIVE BOX
Time of Visit:  Patient seen and examined.     MEDICATIONS  (STANDING):  albuterol/ipratropium for Nebulization 3 milliLiter(s) Nebulizer every 6 hours  amLODIPine   Tablet 10 milliGRAM(s) Oral daily  aspirin enteric coated 81 milliGRAM(s) Oral daily  atorvastatin 40 milliGRAM(s) Oral at bedtime  budesonide 160 MICROgram(s)/formoterol 4.5 MICROgram(s) Inhaler 2 Puff(s) Inhalation two times a day  chlorhexidine 2% Cloths 1 Application(s) Topical daily  dextrose 5%. 1000 milliLiter(s) (50 mL/Hr) IV Continuous <Continuous>  dextrose 5%. 1000 milliLiter(s) (100 mL/Hr) IV Continuous <Continuous>  dextrose 50% Injectable 25 Gram(s) IV Push once  dextrose 50% Injectable 12.5 Gram(s) IV Push once  dextrose 50% Injectable 25 Gram(s) IV Push once  epoetin beverley-epbx (RETACRIT) Injectable 41481 Unit(s) IV Push <User Schedule>  folic acid 1 milliGRAM(s) Oral daily  furosemide   Injectable 40 milliGRAM(s) IV Push daily  glucagon  Injectable 1 milliGRAM(s) IntraMuscular once  hydrALAZINE Injectable 5 milliGRAM(s) IV Push three times a day  insulin glargine Injectable (LANTUS) 4 Unit(s) SubCutaneous at bedtime  latanoprost 0.005% Ophthalmic Solution 1 Drop(s) Both EYES at bedtime  levothyroxine 25 MICROGram(s) Oral daily  lidocaine   4% Patch 2 Patch Transdermal daily  LORazepam   Injectable 1.5 milliGRAM(s) IV Push once  metoprolol tartrate Injectable 5 milliGRAM(s) IV Push every 12 hours  pantoprazole  Injectable 40 milliGRAM(s) IV Push every 12 hours  sertraline 200 milliGRAM(s) Oral daily  sevelamer carbonate 800 milliGRAM(s) Oral three times a day with meals  sucralfate 1 Gram(s) Oral four times a day      MEDICATIONS  (PRN):  acetaminophen     Tablet .. 975 milliGRAM(s) Oral every 8 hours PRN Moderate Pain (4 - 6)  dextrose Oral Gel 15 Gram(s) Oral once PRN Blood Glucose LESS THAN 70 milliGRAM(s)/deciliter  LORazepam   Injectable 0.5 milliGRAM(s) IV Push every 4 hours PRN agitation and nausea  ondansetron   Disintegrating Tablet 4 milliGRAM(s) Oral two times a day PRN Nausea and/or Vomiting  ondansetron Injectable 4 milliGRAM(s) IV Push every 6 hours PRN Nausea and/or Vomiting       Medications up to date at time of exam.      PHYSICAL EXAMINATION:  Patient has no new complaints.  GENERAL: The patient is a well-developed, well-nourished, in no apparent distress.     Vital Signs Last 24 Hrs  T(C): 36.8 (11 Aug 2023 15:45), Max: 37.3 (11 Aug 2023 06:49)  T(F): 98.3 (11 Aug 2023 15:45), Max: 99.1 (11 Aug 2023 06:49)  HR: 95 (11 Aug 2023 15:45) (88 - 102)  BP: 139/83 (11 Aug 2023 15:45) (129/69 - 189/85)  BP(mean): --  RR: 20 (11 Aug 2023 15:45) (18 - 20)  SpO2: 99% (11 Aug 2023 15:45) (91% - 100%)    Parameters below as of 11 Aug 2023 15:45  Patient On (Oxygen Delivery Method): room air  O2 Flow (L/min): 2     (if applicable)    Chest Tube (if applicable)    HEENT: Head is normocephalic and atraumatic. Extraocular muscles are intact. Mucous membranes are moist.     NECK: Supple, no palpable adenopathy.    LUNGS: Clear to auscultation, no wheezing, rales, or rhonchi.    HEART: Regular rate and rhythm without murmur.    ABDOMEN: Soft, nontender, and nondistended.  No hepatosplenomegaly is noted.    : No painful voiding, no pelvic pain    EXTREMITIES: left BKA     NEUROLOGIC: Awake, respond to simple questions     SKIN: Warm, dry, good turgor.      LABS:                        8.3    2.84  )-----------( 86       ( 11 Aug 2023 06:23 )             27.4     08-11    138  |  100  |  47<H>  ----------------------------<  101<H>  3.8   |  25  |  12.90<H>    Ca    8.4      11 Aug 2023 06:23  Phos  5.5     08-11  Mg     2.6     08-11        Urinalysis Basic - ( 11 Aug 2023 06:23 )    Color: x / Appearance: x / SG: x / pH: x  Gluc: 101 mg/dL / Ketone: x  / Bili: x / Urobili: x   Blood: x / Protein: x / Nitrite: x   Leuk Esterase: x / RBC: x / WBC x   Sq Epi: x / Non Sq Epi: x / Bacteria: x                      MICROBIOLOGY: (if applicable)    RADIOLOGY & ADDITIONAL STUDIES:  EKG:   CXR:  ECHO:    IMPRESSION: 62y Male 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      Osteoarthritis      Vision loss of left eye  blind      ESRD on hemodialysis  T/Th/S      Cataract  both eyes - hx of sx done      BPH (benign prostatic hyperplasia)      UTI (urinary tract infection)  hx of      Bladder mass  hx of      H/O hematuria      Osteoporosis      Vision loss of right eye  decreased      Depression      Chronic GERD      Osteomyelitis of vertebra      CHF (congestive heart failure)      Below knee amputation status, left  2012- pt is wearing prostesis      History of right cataract extraction      History of left cataract extraction      S/P arteriovenous (AV) fistula creation  right arm brachiocephalic arteriovenous fistula on 11/08/2018      H/O hematuria  s/p bladder bx and fulguration 2/25/2020      H/O transurethral destruction of bladder lesion  2020      History of excision of mass  back mass on 03/31/2021       p/w         Impression: This is a 62 yr old man  from [facility] with ESRD on HD ( T-Th-Sat ). Current smoker . Presented to ED due to Missed Dialysis, last HD 07-22-23 . Per Patient stated that he often missed HD sessions due to Mild left leg pain and right leg swelling . S/p RRT for SOB with Hypoxia due to Acute Hypoxic Respiratory Failure secondary to Pulmonary edema/ Fluid overload due to Missed Dialysis . CT Abdomen with Small Bilateral Pleural Effusions, Rt Lower Lung with groundglass opacity. No bowel obstruction. Small Gall Stone.   Vomiting due to diabetic gastroparesis vs acute cholecystitis       Suggestions:  - Pat agreed for HD today   - Limit fluid intake   - Continue O2 Supp as needed to maintain sat >90%   - Continue Duoneb via nebulization Q 6 Hours .   - Reinforced the importance of compliance to Dialysis schedule.   - Aspiration precaution with HOB elevation especially during meal times.

## 2023-08-11 NOTE — PROGRESS NOTE ADULT - ASSESSMENT
# ESRD. had 2.5 hrs of HD on wednesday.   pre-HD ativan dose to 2 mg  HD today  D/W pt compliance with HD as prescribed.  # anemia of CKD. epogen on HD. transfuse for HBG <7  #CKDMBD. phos at goal

## 2023-08-11 NOTE — PROGRESS NOTE ADULT - PROBLEM SELECTOR PLAN 12
- Continue to hold heparin due to ongoing anemia and concern for possible gastric ulcer  - VTE: SCD   - C/w Protonix for GI ppx    Discharge planning: pt from [facility]   reccomended for DIEGO   Opts to return back to [facility]   pending 4 successful HD prior D/C

## 2023-08-11 NOTE — PROGRESS NOTE ADULT - SUBJECTIVE AND OBJECTIVE BOX
DATE OF SERVICE: 08-11-23    Patient denies chest pain or shortness of breath.   Review of symptoms otherwise negative.    acetaminophen     Tablet .. 975 milliGRAM(s) Oral every 8 hours PRN  albuterol/ipratropium for Nebulization 3 milliLiter(s) Nebulizer every 6 hours  amLODIPine   Tablet 10 milliGRAM(s) Oral daily  aspirin enteric coated 81 milliGRAM(s) Oral daily  atorvastatin 40 milliGRAM(s) Oral at bedtime  budesonide 160 MICROgram(s)/formoterol 4.5 MICROgram(s) Inhaler 2 Puff(s) Inhalation two times a day  chlorhexidine 2% Cloths 1 Application(s) Topical daily  dextrose 5%. 1000 milliLiter(s) IV Continuous <Continuous>  dextrose 5%. 1000 milliLiter(s) IV Continuous <Continuous>  dextrose 50% Injectable 25 Gram(s) IV Push once  dextrose 50% Injectable 12.5 Gram(s) IV Push once  dextrose 50% Injectable 25 Gram(s) IV Push once  dextrose Oral Gel 15 Gram(s) Oral once PRN  epoetin beverley-epbx (RETACRIT) Injectable 26188 Unit(s) IV Push <User Schedule>  folic acid 1 milliGRAM(s) Oral daily  furosemide   Injectable 40 milliGRAM(s) IV Push daily  glucagon  Injectable 1 milliGRAM(s) IntraMuscular once  hydrALAZINE Injectable 5 milliGRAM(s) IV Push three times a day  insulin glargine Injectable (LANTUS) 4 Unit(s) SubCutaneous at bedtime  latanoprost 0.005% Ophthalmic Solution 1 Drop(s) Both EYES at bedtime  levothyroxine 25 MICROGram(s) Oral daily  lidocaine   4% Patch 2 Patch Transdermal daily  LORazepam   Injectable 0.5 milliGRAM(s) IV Push every 4 hours PRN  metoprolol tartrate Injectable 5 milliGRAM(s) IV Push every 12 hours  ondansetron   Disintegrating Tablet 4 milliGRAM(s) Oral two times a day PRN  ondansetron Injectable 4 milliGRAM(s) IV Push every 6 hours PRN  pantoprazole  Injectable 40 milliGRAM(s) IV Push every 12 hours  sertraline 200 milliGRAM(s) Oral daily  sevelamer carbonate 800 milliGRAM(s) Oral three times a day with meals  sucralfate 1 Gram(s) Oral four times a day                            8.3    2.84  )-----------( 86       ( 11 Aug 2023 06:23 )             27.4       Hemoglobin: 8.3 g/dL (08-11 @ 06:23)  Hemoglobin: 8.0 g/dL (08-10 @ 06:21)  Hemoglobin: 7.6 g/dL (08-09 @ 05:50)  Hemoglobin: 7.1 g/dL (08-08 @ 07:05)      08-11    138  |  100  |  47<H>  ----------------------------<  101<H>  3.8   |  25  |  12.90<H>    Ca    8.4      11 Aug 2023 06:23  Phos  5.5     08-11  Mg     2.6     08-11      Creatinine Trend: 12.90<--, 11.70<--, 17.50<--, 17.40<--, 16.90<--, 15.40<--    COAGS:           T(C): 37.3 (08-11-23 @ 06:49), Max: 37.3 (08-11-23 @ 06:49)  HR: 92 (08-11-23 @ 06:49) (88 - 102)  BP: 175/82 (08-11-23 @ 06:49) (129/69 - 189/85)  RR: 20 (08-11-23 @ 06:49) (18 - 20)  SpO2: 98% (08-11-23 @ 06:49) (91% - 100%)  Wt(kg): --    I&O's Summary      HEENT:  (-)icterus (-)pallor  CV: N S1 S2 1/6 DIANA (+)2 Pulses B/l  Resp:  Clear to ausculatation B/L, normal effort  GI: (+) BS Soft, NT, ND  Lymph:  (-)Edema, (-)obvious lymphadenopathy  Skin: Warm to touch, Normal turgor  Psych: Appropriate mood and affect         ASSESSMENT/PLAN: 	62y Male  Mateus Murray, walks with RW, with PMH of ESRD on HD (TTS), BKA- L w/prosthetic leg, DM, Left eye blindness, CHF,, HTN, HLD, EF 45-50%, normal perfusion 4/23 osteoporosis, bronchitis, current smoker, and anemia who presents with complaint of missed dialysis.     # CHF  - Due to missed HD  - HD per renal    # Positive trop  - nothing to suggest ACS.  His trop has been higher in the past and demonstrated normal perfusion on Stress 4/23  - ECHO noted, normal LV fx severe pulm HTN, cont to keep net negative     # Noncompliance  - Behavioral health f/u  - refusing meds and blood tests as well as HD at times     # Smoking  - Cessation stressed    # HTN  - Suspect BP will improve once euvolemic         Dominic Still MD, [facility]  BEEPER [phone]

## 2023-08-11 NOTE — PROGRESS NOTE ADULT - PROBLEM SELECTOR PLAN 4
- P/w intractable vomiting and coffee ground emesis  - S/p CT A/P negative   - C/w Protonix   - C/w Zofran PRN  - continue all meds IVP - consider transitioning to IV when n/v improves  - Sx-Dr. Quan consulted-No planned intervention   - GI-Dr. Lee consulted-No planned intervention

## 2023-08-11 NOTE — PROGRESS NOTE ADULT - PROBLEM SELECTOR PLAN 7
- hypertensive overnight   - S/P IV Hydralazine  - all meds IV 2/2 gastroparesis and medication noncompliance   - C/w Hydralazine, Lopressor and Amlodipine   - monitor BP

## 2023-08-11 NOTE — PROGRESS NOTE ADULT - ASSESSMENT
62 year old male from [facility], walks with RW, with PMH of ESRD on HD (TTS), BKA- L w/prosthetic leg, DM, Left eye blindness, CHF, CAD, HTN, HLD, osteoporosis, bronchitis, current smoker, and anemia who presents with complaint of missed dialysis and does not make any urine. Patient states he was having shortness of breath. Patient admitted to telemetry for Acute Hyperkalemia 2/2 missed dialysis found to have pulmonary edema and BNP [id number]. Cardiology and nephro following. Hospital course complicated by hypoglycemia, and rapid response for AHRF     NP informed on rounds after discussion that pt requires 4 HD sessions prior to dc.  Pt completed 1/4 sessions a/o 8/10.

## 2023-08-11 NOTE — PROGRESS NOTE ADULT - SUBJECTIVE AND OBJECTIVE BOX
NP Note discussed with  primary attending    Patient is a 62y old  Male who presents with a chief complaint of Missed dialysis (11 Aug 2023 10:38)      INTERVAL HPI/OVERNIGHT EVENTS: hypertensive overnight     MEDICATIONS  (STANDING):  albuterol/ipratropium for Nebulization 3 milliLiter(s) Nebulizer every 6 hours  amLODIPine   Tablet 10 milliGRAM(s) Oral daily  aspirin enteric coated 81 milliGRAM(s) Oral daily  atorvastatin 40 milliGRAM(s) Oral at bedtime  budesonide 160 MICROgram(s)/formoterol 4.5 MICROgram(s) Inhaler 2 Puff(s) Inhalation two times a day  chlorhexidine 2% Cloths 1 Application(s) Topical daily  dextrose 5%. 1000 milliLiter(s) (50 mL/Hr) IV Continuous <Continuous>  dextrose 5%. 1000 milliLiter(s) (100 mL/Hr) IV Continuous <Continuous>  dextrose 50% Injectable 25 Gram(s) IV Push once  dextrose 50% Injectable 12.5 Gram(s) IV Push once  dextrose 50% Injectable 25 Gram(s) IV Push once  epoetin beverley-epbx (RETACRIT) Injectable 17041 Unit(s) IV Push <User Schedule>  folic acid 1 milliGRAM(s) Oral daily  furosemide   Injectable 40 milliGRAM(s) IV Push daily  glucagon  Injectable 1 milliGRAM(s) IntraMuscular once  hydrALAZINE Injectable 5 milliGRAM(s) IV Push three times a day  insulin glargine Injectable (LANTUS) 4 Unit(s) SubCutaneous at bedtime  latanoprost 0.005% Ophthalmic Solution 1 Drop(s) Both EYES at bedtime  levothyroxine 25 MICROGram(s) Oral daily  lidocaine   4% Patch 2 Patch Transdermal daily  LORazepam   Injectable 1.5 milliGRAM(s) IV Push once  metoprolol tartrate Injectable 5 milliGRAM(s) IV Push every 12 hours  pantoprazole  Injectable 40 milliGRAM(s) IV Push every 12 hours  sertraline 200 milliGRAM(s) Oral daily  sevelamer carbonate 800 milliGRAM(s) Oral three times a day with meals  sucralfate 1 Gram(s) Oral four times a day    MEDICATIONS  (PRN):  acetaminophen     Tablet .. 975 milliGRAM(s) Oral every 8 hours PRN Moderate Pain (4 - 6)  dextrose Oral Gel 15 Gram(s) Oral once PRN Blood Glucose LESS THAN 70 milliGRAM(s)/deciliter  LORazepam   Injectable 0.5 milliGRAM(s) IV Push every 4 hours PRN agitation and nausea  ondansetron   Disintegrating Tablet 4 milliGRAM(s) Oral two times a day PRN Nausea and/or Vomiting  ondansetron Injectable 4 milliGRAM(s) IV Push every 6 hours PRN Nausea and/or Vomiting      __________________________________________________  REVIEW OF SYSTEMS:  limited 2/2 patient's participation     Vital Signs Last 24 Hrs  T(C): 36.8 (11 Aug 2023 13:12), Max: 37.3 (11 Aug 2023 06:49)  T(F): 98.2 (11 Aug 2023 13:12), Max: 99.1 (11 Aug 2023 06:49)  HR: 93 (11 Aug 2023 13:12) (90 - 102)  BP: 142/70 (11 Aug 2023 13:12) (129/69 - 189/85)  BP(mean): --  RR: 18 (11 Aug 2023 13:12) (18 - 20)  SpO2: 100% (11 Aug 2023 13:12) (91% - 100%)    Parameters below as of 11 Aug 2023 13:12  Patient On (Oxygen Delivery Method): nasal cannula  O2 Flow (L/min): 2      ________________________________________________  PHYSICAL EXAM:  GENERAL: NAD  HEENT: Normocephalic;  conjunctivae and sclerae clear;   NECK : supple  CHEST/LUNG: diminished 2/2 poor effort   HEART: S1 S2  regular; no murmurs, gallops or rubs  ABDOMEN: Soft, Nontender, Nondistended; Bowel sounds present  EXTREMITIES: No cyanosis; no edema; no calf tenderness.  (+) R. AVF.  (+) L. BKA.  SKIN: warm and dry; no rash  NERVOUS SYSTEM:  arouses to verbal stimuli, withdrawn difficult to engage   _________________________________________________  LABS:                        8.3    2.84  )-----------( 86       ( 11 Aug 2023 06:23 )             27.4     08-11    138  |  100  |  47<H>  ----------------------------<  101<H>  3.8   |  25  |  12.90<H>    Ca    8.4      11 Aug 2023 06:23  Phos  5.5     08-11  Mg     2.6     08-11        Urinalysis Basic - ( 11 Aug 2023 06:23 )    Color: x / Appearance: x / SG: x / pH: x  Gluc: 101 mg/dL / Ketone: x  / Bili: x / Urobili: x   Blood: x / Protein: x / Nitrite: x   Leuk Esterase: x / RBC: x / WBC x   Sq Epi: x / Non Sq Epi: x / Bacteria: x      CAPILLARY BLOOD GLUCOSE      POCT Blood Glucose.: 143 mg/dL (11 Aug 2023 11:55)  POCT Blood Glucose.: 124 mg/dL (11 Aug 2023 08:02)  POCT Blood Glucose.: 75 mg/dL (10 Aug 2023 22:51)  POCT Blood Glucose.: 79 mg/dL (10 Aug 2023 21:01)  POCT Blood Glucose.: 98 mg/dL (10 Aug 2023 17:00)    RADIOLOGY & ADDITIONAL TESTS:   < from: NM Hepatobiliary Imaging w/ RX (07.31.23 @ 10:44) >  IMPRESSION: Patient declined to complete the full exam and bowel was not   visualized on the available images. Otherwise unremarkable exam with no   evidence of acute cholecystitis or biliary obstruction.        --- End of Report ---    < end of copied text >  < from: CT Abdomen and Pelvis No Cont (07.30.23 @ 13:35) >  IMPRESSION: Small gallstones. Nonspecific trace pericholecystic fluid..   If there is a clinical suspicion for acute cholecystitis, gallbladder   ultrasound/HIDA scan may be pursued for further evaluation.    Stable dilated common bile duct and main pancreatic duct. Please see   previous MRCP dated 2/23/2023.    No bowel obstruction. Appendix unremarkable. Nonspecific mild distal   esophageal mural thickening.    Small ascites.    Anasarca.    Small bilateral pleural effusions. Small bilateral atelectasis.   Nonspecific groundglass densities in the right lower lungzone; clinical   correlation with pneumonia is suggested.    Cardiomegaly and mild pericardial effusion, unchanged.    --- End of Report ---      < end of copied text >    < from: Xray Chest 1 View-PORTABLE IMMEDIATE (Xray Chest 1 View-PORTABLE IMMEDIATE .) (07.30.23 @ 09:28) >  IMPRESSION: Increasing CHF. Gross heart enlargement again noted.    --- End of Report ---        < end of copied text >  < from: CT Chest No Cont (07.26.23 @ 18:09) >  IMPRESSION:  Mild pulmonary edema and marked anasarca.  A dilated proximal pancreatic duct and mildly dilated biliary tree are   without significant change.      --- End of Report ---    < end of copied text >    < from: CT Abdomen and Pelvis No Cont (07.26.23 @ 18:09) >  IMPRESSION:  Mild pulmonary edema and marked anasarca.  A dilated proximal pancreatic duct and mildly dilated biliary tree are   without significant change.      --- End of Report ---        < end of copied text >  < from: Xray Chest 1 View- PORTABLE-Urgent (07.26.23 @ 13:00) >  IMPRESSION:  1. Discoid atelectasis in the right upper lobe adjacent to the minor   fissure versus pleural fluid within the fissure itself. This is not   present previously.  2. Cardiomegaly without pulmonary edema.  3. Right sided subclavian vascular stent along with apparent coils within   the soft tissues of the medial right upper extremity, unchanged.    --- End of Report ---    < end of copied text >  Imaging Personally Reviewed:  YES/  Consultant(s) Notes Reviewed:   YES/     Plan of care was discussed with patient and /or primary care giver; all questions and concerns were addressed and care was aligned with patient's wishes.

## 2023-08-11 NOTE — PROGRESS NOTE ADULT - SUBJECTIVE AND OBJECTIVE BOX
Patient is a 62y old  Male who presents with a chief complaint of Missed dialysis (11 Aug 2023 09:07)    PATIENT IS SEEN AND EXAMINED IN MEDICAL FLOOR.  MARIIT [    ]    JEREMY [   ]      GT [   ]    ALLERGIES:  No Known Drug Allergies  fish (Rash)  liver (Anaphylaxis)      Daily     Daily     VITALS:    Vital Signs Last 24 Hrs  T(C): 37.3 (11 Aug 2023 06:49), Max: 37.3 (11 Aug 2023 06:49)  T(F): 99.1 (11 Aug 2023 06:49), Max: 99.1 (11 Aug 2023 06:49)  HR: 92 (11 Aug 2023 06:49) (88 - 102)  BP: 175/82 (11 Aug 2023 06:49) (129/69 - 189/85)  BP(mean): --  RR: 20 (11 Aug 2023 06:49) (18 - 20)  SpO2: 98% (11 Aug 2023 06:49) (91% - 100%)    Parameters below as of 11 Aug 2023 06:49  Patient On (Oxygen Delivery Method): nasal cannula  O2 Flow (L/min): 2      LABS:    CBC Full  -  ( 11 Aug 2023 06:23 )  WBC Count : 2.84 K/uL  RBC Count : 3.03 M/uL  Hemoglobin : 8.3 g/dL  Hematocrit : 27.4 %  Platelet Count - Automated : 86 K/uL  Mean Cell Volume : 90.4 fl  Mean Cell Hemoglobin : 27.4 pg  Mean Cell Hemoglobin Concentration : 30.3 gm/dL  Auto Neutrophil # : x  Auto Lymphocyte # : x  Auto Monocyte # : x  Auto Eosinophil # : x  Auto Basophil # : x  Auto Neutrophil % : x  Auto Lymphocyte % : x  Auto Monocyte % : x  Auto Eosinophil % : x  Auto Basophil % : x      08-11    138  |  100  |  47<H>  ----------------------------<  101<H>  3.8   |  25  |  12.90<H>    Ca    8.4      11 Aug 2023 06:23  Phos  5.5     08-11  Mg     2.6     08-11      CAPILLARY BLOOD GLUCOSE      POCT Blood Glucose.: 124 mg/dL (11 Aug 2023 08:02)  POCT Blood Glucose.: 75 mg/dL (10 Aug 2023 22:51)  POCT Blood Glucose.: 79 mg/dL (10 Aug 2023 21:01)  POCT Blood Glucose.: 98 mg/dL (10 Aug 2023 17:00)  POCT Blood Glucose.: 104 mg/dL (10 Aug 2023 12:11)          Creatinine Trend: 12.90<--, 11.70<--, 17.50<--, 17.40<--, 16.90<--, 15.40<--  I&O's Summary          .Blood Blood-Peripheral  05-28 @ 14:07   No Growth Final  --  --          MEDICATIONS:    MEDICATIONS  (STANDING):  albuterol/ipratropium for Nebulization 3 milliLiter(s) Nebulizer every 6 hours  amLODIPine   Tablet 10 milliGRAM(s) Oral daily  aspirin enteric coated 81 milliGRAM(s) Oral daily  atorvastatin 40 milliGRAM(s) Oral at bedtime  budesonide 160 MICROgram(s)/formoterol 4.5 MICROgram(s) Inhaler 2 Puff(s) Inhalation two times a day  chlorhexidine 2% Cloths 1 Application(s) Topical daily  dextrose 5%. 1000 milliLiter(s) (50 mL/Hr) IV Continuous <Continuous>  dextrose 5%. 1000 milliLiter(s) (100 mL/Hr) IV Continuous <Continuous>  dextrose 50% Injectable 25 Gram(s) IV Push once  dextrose 50% Injectable 25 Gram(s) IV Push once  dextrose 50% Injectable 12.5 Gram(s) IV Push once  epoetin beverley-epbx (RETACRIT) Injectable 40925 Unit(s) IV Push <User Schedule>  folic acid 1 milliGRAM(s) Oral daily  furosemide   Injectable 40 milliGRAM(s) IV Push daily  glucagon  Injectable 1 milliGRAM(s) IntraMuscular once  hydrALAZINE Injectable 5 milliGRAM(s) IV Push three times a day  insulin glargine Injectable (LANTUS) 4 Unit(s) SubCutaneous at bedtime  latanoprost 0.005% Ophthalmic Solution 1 Drop(s) Both EYES at bedtime  levothyroxine 25 MICROGram(s) Oral daily  lidocaine   4% Patch 2 Patch Transdermal daily  metoprolol tartrate Injectable 5 milliGRAM(s) IV Push every 12 hours  pantoprazole  Injectable 40 milliGRAM(s) IV Push every 12 hours  sertraline 200 milliGRAM(s) Oral daily  sevelamer carbonate 800 milliGRAM(s) Oral three times a day with meals  sucralfate 1 Gram(s) Oral four times a day      MEDICATIONS  (PRN):  acetaminophen     Tablet .. 975 milliGRAM(s) Oral every 8 hours PRN Moderate Pain (4 - 6)  dextrose Oral Gel 15 Gram(s) Oral once PRN Blood Glucose LESS THAN 70 milliGRAM(s)/deciliter  LORazepam   Injectable 0.5 milliGRAM(s) IV Push every 4 hours PRN agitation and nausea  ondansetron   Disintegrating Tablet 4 milliGRAM(s) Oral two times a day PRN Nausea and/or Vomiting  ondansetron Injectable 4 milliGRAM(s) IV Push every 6 hours PRN Nausea and/or Vomiting      REVIEW OF SYSTEMS:                           ALL ROS DONE [ X   ]    CONSTITUTIONAL:  LETHARGIC [   ], FEVER [   ], UNRESPONSIVE [   ]  CVS:  CP  [   ], SOB, [   ], PALPITATIONS [   ], DIZZYNESS [   ]  RS: COUGH [   ], SPUTUM [   ]  GI: ABDOMINAL PAIN [   ], NAUSEA [   ], VOMITINGS [   ], DIARRHEA [   ], CONSTIPATION [   ]  :  DYSURIA [   ], NOCTURIA [   ], INCREASED FREQUENCY [   ], DRIBLING [   ],  SKELETAL: PAINFUL JOINTS [   ], SWOLLEN JOINTS [   ], NECK ACHE [   ], LOW BACK ACHE [   ],  SKIN : ULCERS [   ], RASH [   ], ITCHING [   ]  CNS: HEAD ACHE [   ], DOUBLE VISION [   ], BLURRED VISION [   ], AMS / CONFUSION [   ], SEIZURES [   ], WEAKNESS [   ],TINGLING / NUMBNESS [   ]    PHYSICAL EXAMINATION:  GENERAL APPEARANCE: NO DISTRESS,    ANASARCA ++  HEENT:  NO PALLOR, NO  JVD,  NO   NODES, NECK SUPPLE  CVS: S1 +, S2 +,   RS: AEEB,  OCCASIONAL  RALES +,   CRACKLES+ AT LUNG BASES  ABD: SOFT, NT, NO, BS +  EXT: LEFT BKA  SKIN: WARM,   SKELETAL:  ROM ACCEPTABLE  CNS:  AAO X  2-3      RADIOLOGY :    RADIOLOGY AND READINGS REVIEWED    ASSESSMENT :     Hypervolemia    No pertinent past medical history    Hypertension    Adrenal insufficiency    CKD (chronic kidney disease)    Anemia    Glaucoma    Coronary artery disease    HLD (hyperlipidemia)    Peripheral vascular disease    Spinal stenosis of lumbosacral region    Hyperparathyroidism    Diabetes mellitus    Diabetic neuropathy    Contracture of hand    Osteoarthritis    Osteoporosis    Vision loss of left eye    ESRD on hemodialysis    Cataract    BPH (benign prostatic hyperplasia)    UTI (urinary tract infection)    Bladder mass    H/O hematuria    Osteoporosis    Vision loss of right eye    Depression    Chronic GERD    Osteomyelitis of vertebra    CHF (congestive heart failure)    No significant past surgical history    Below knee amputation status, left    History of right cataract extraction    History of left cataract extraction    S/P arteriovenous (AV) fistula creation    H/O hematuria    H/O transurethral destruction of bladder lesion    History of excision of mass        PLAN:  HPI:  Patient is 62 year old male from [facility], walks with RW, with PMH of ESRD on HD (TTS), BKA- L w/prosthetic leg, DM, Left eye blindness, CHF, CAD, HTN, HLD, osteoporosis, bronchitis, current smoker, and anemia who presents with complaint of missed dialysis. Last HD 7/22. Pt states he often missed HD sessions.  patient states he missed this HD session due to mild pain in left leg, patient remains able to ambulate. Pt complains of right leg swelling and states he does not make any urine. Patient states he was having mild shortness of breath.  Pt denies any fever, chills, N/V/D, constipation, CP, numbness or tingling (26 Jul 2023 19:20)    # [8/9] MEETING HELD WITH PATIENT, BROTHER ARTEMIO @ [phone] AND SW TEAM. PATIENT W/ HISTORY OF ROUTINE NONCOMPLIANCE W/ LIFE-SUSTAINING TREATMENT [HD], EVALUATIONS AND INTERVENTIONS - COUNSELLED AT LENGTH TO REMAIN COMPLIANT. DISCUSSED THAT PROGNOSIS IS POOR/GRIM GIVEN UNDERLYING MEDICAL CONDITIONS AND GIVEN NONCOMPLIANCE. HE VERBALIZED UNDERSTANDING. REGARDING GOC - PATIENT WISHES FOR FULL CODE. PALLIATIVE CARE CONSULTED. PSYCHIATRY CONSULTED. PATIENT EXPRESSED THAT HE DOES GROW IMPATIENT DURING DIALYSIS SESSIONS AND HAS BEEN LEAVING SESSIONS SOONER THAN PRESCRIBED. IN DISCUSSION THAT RISKS OF DEFERRING COMPLETE HD - INCLUDE BUT ARE NOT LIMITED TO WORSENING CLINICAL CONDITION, ELECTROLYTE ABNORMALITIES, ARRHYTHMIA AND DEATH. HE VERBALIZED UNDERSTANDING. D/W PATIENT THAT REPEATEDLY REFUSING INTERVENTIONS AND ASSESSMENTS IS NOT IN LINE WITH HIS WISHES TO IMPROVE. PATIENT VERBALIZED UNDERSTANDING. DISCUSSED REGARDING CURRENT CLINICAL CONDITION, RECOMMENDED EVALUATIONS AND INTERVENTIONS. ALL QUESTIONS ANSWERED. TEAM HAS OFFERED PATIENT EMOTIONAL SUPPORT AND OFFERED MEDICATION FOR MOOD. OFFERED TO PRE-MEDICATE W/ ANXIOLYTIC PRIOR TO HD. PATIENT IS AGREEABLE.     DISCUSSED THAT PATIENT WILL NEED TO COMPLY WITH HD SESSIONS IN ORDER TO BE MEDICALLY OPTIMIZED FOR DISCHARGE AND FOR SAFE D/C PLANNING. TEAM DISCUSSED OPTIONS OF RETURNING TO [facility] W/ OUTPATIENT HD , IF CARE NEEDS CAN BE SAFELY MET VS. NURSING HOME/[facility] . PATIENT AND BROTHER VERBALIZED UNDERSTANDING.       # ACUTE ON CHRONIC HYPOXIC RESPIRATORY FAILURE S/T PULMONARY EDEMA - S/T INCOMPLETE HD SESSIONS ; ANASARCA  # HYPERKALEMIA  # HX OF COPD + S/P RECENT MULTIFOCAL PNEUMONIA ; R/O ASPIRATION PNEUMONIA  # PULMONARY HYPERTENSION  # UNCONTROLLED HTN  # ESRD ON HD TTS - W/ HX OF NONCOMPLIANCE    - TELEMETRY  - HYDRALAZINE, METOPROLOL AND NORVASC ; PRN IV ANTIHYPERTENSIVE  - LOKELMA  - SUPPLEMENTAL O2  - PLANNED FOR HD  - NEPHROLOGY CONSULT  - PULMONOLOGY CONSULT  - ID CONSULT    - CONTINUES TO REFUSE OR PRE-MATURELY DISCONTINUE SESSIONS OF HD       - [7/30] - OVERNIGHT RAPID RESPONSE S/T HYPOXIA - SELF-LIMITED - PATIENT IMPROVED S/P O2 SUPPLEMENT  - S/P CEFEPIME + FLAGYLL, BCX - NGTD      # RECURRENT INTRACTABLE NAUSEA/VOMITING, ? COFFEE GROUND EMESIS  # GASTROPARESIS  # HISTORY OF ESOPHAGITIS AND DUODENITIS [1/2021], HX OF GASTROPARESIS W/ EVIDENCE OF THICKENED ESOPHAGUS ON PREVIOUS CT SCAN   # PANCREAS W/ DOUBLE DUCT SIGN    - MONITORING HGB, PLACED ON PPI BID , CARAFATE QID  - PRN ANTIEMETICS  ; S/P DOSE OF REGLAN [WITH MINIMAL IMPROVEMENT]  - MONITORING FOR SYMPTOMS  - WHILE ON DIET PATIENT REPEATEDLY COUNSELLED TO CHEW FOOD CAUTIOUSLY AND CONSUME SMALL BITES W/ REGULAR CLEARING WITH WATER BETWEEN BITES    - ADVANCE DIET AS TOLERATED  - NOTED CT A/P    - S/P RECENT PRBC TRANSFUSION     - GI CONSULT  - SURGERY CONSULT    # LESS LIKELY CHOLECYSTITIS  - S/P ABX  - NOTED CT A/P  - HIDA SCAN - NEGATIVE  - SURGERY CONSULT    # ELEVATED TROPONINS - ? DEMAND ISCHEMIA    - S/P TELEMETRY  - TREND TROPONINS  - ECHO - TRACE MR, MODERATELY INCREASED LV WALL THICKNESS, NORMAL LV SYSTOLIC FUNCTION, SEVERE PULMONARY HTN  - CARDIOLOGY CONSULT    # EPISODE OF HYPOGLYCEMIA - IMPROVED  # GASTROPARESIS  # UNDERLYING DM  - SSI + FS  - COUNSELLED TO COMPLY WITH HD TO REDUCE UREMIA    - REPEATEDLY COUNSELLED TO COMPLY WITH FINGERSTICKS      # DEPRESSION  - PLACED ON ZOLOFT  - PSYCHIATRY CONSULT    # PATIENT UNDERGOING OUTPATIENT WORKUP FOR CENTRAL VENOUS STENOSIS THROUGH NEPHROLOGY TEAM  - ? s/p STENT PLACEMENT    # ANEMIA OF CKD  # HX OF PANCYTOPENIA   - TREND HGB, TRANSFUSION THRESHOLD HGB < 7; PATIENT STILL INTERMITTENTLY REFUSING BLOODWORK, COUNSELLED TO COMPLY  - TYPE AND SCREEN  - ON EPO  - DENIES RECENT HEMATEMESIS, MELENA, HEMATOCHEZIA    - S/P RECENT PRBC TRANSFUSION  - S/P PRBC TRANSFUSION 7/29    - PPI BID, CARAFATE QID    # SEVERE PROTEIN CALORIE MALNUTRITION, FAILURE TO THRIVE   -  TEMPORAL WASTING, LOSS OF MUSCLE MASS FROM SHOULDER AND HIP GIRDLE  - NUTRITIONAL SUPPLEMENT    # HLD  # PARTIALLY BLIND  # S/P LEFT BKA  # HX OF PVD  # LS SPINAL STENOSIS  # GI AND DVT PPX     Patient is a 62y old  Male who presents with a chief complaint of Missed dialysis (11 Aug 2023 09:07)    PATIENT IS SEEN AND EXAMINED IN MEDICAL FLOOR.      ALLERGIES:  No Known Drug Allergies  fish (Rash)  liver (Anaphylaxis)      VITALS:    Vital Signs Last 24 Hrs  T(C): 37.3 (11 Aug 2023 06:49), Max: 37.3 (11 Aug 2023 06:49)  T(F): 99.1 (11 Aug 2023 06:49), Max: 99.1 (11 Aug 2023 06:49)  HR: 92 (11 Aug 2023 06:49) (88 - 102)  BP: 175/82 (11 Aug 2023 06:49) (129/69 - 189/85)  BP(mean): --  RR: 20 (11 Aug 2023 06:49) (18 - 20)  SpO2: 98% (11 Aug 2023 06:49) (91% - 100%)    Parameters below as of 11 Aug 2023 06:49  Patient On (Oxygen Delivery Method): nasal cannula  O2 Flow (L/min): 2      LABS:    CBC Full  -  ( 11 Aug 2023 06:23 )  WBC Count : 2.84 K/uL  RBC Count : 3.03 M/uL  Hemoglobin : 8.3 g/dL  Hematocrit : 27.4 %  Platelet Count - Automated : 86 K/uL  Mean Cell Volume : 90.4 fl  Mean Cell Hemoglobin : 27.4 pg  Mean Cell Hemoglobin Concentration : 30.3 gm/dL  Auto Neutrophil # : x  Auto Lymphocyte # : x  Auto Monocyte # : x  Auto Eosinophil # : x  Auto Basophil # : x  Auto Neutrophil % : x  Auto Lymphocyte % : x  Auto Monocyte % : x  Auto Eosinophil % : x  Auto Basophil % : x      08-11    138  |  100  |  47<H>  ----------------------------<  101<H>  3.8   |  25  |  12.90<H>    Ca    8.4      11 Aug 2023 06:23  Phos  5.5     08-11  Mg     2.6     08-11      CAPILLARY BLOOD GLUCOSE      POCT Blood Glucose.: 124 mg/dL (11 Aug 2023 08:02)  POCT Blood Glucose.: 75 mg/dL (10 Aug 2023 22:51)  POCT Blood Glucose.: 79 mg/dL (10 Aug 2023 21:01)  POCT Blood Glucose.: 98 mg/dL (10 Aug 2023 17:00)  POCT Blood Glucose.: 104 mg/dL (10 Aug 2023 12:11)          Creatinine Trend: 12.90<--, 11.70<--, 17.50<--, 17.40<--, 16.90<--, 15.40<--  I&O's Summary          .Blood Blood-Peripheral  05-28 @ 14:07   No Growth Final  --  --          MEDICATIONS:    MEDICATIONS  (STANDING):  albuterol/ipratropium for Nebulization 3 milliLiter(s) Nebulizer every 6 hours  amLODIPine   Tablet 10 milliGRAM(s) Oral daily  aspirin enteric coated 81 milliGRAM(s) Oral daily  atorvastatin 40 milliGRAM(s) Oral at bedtime  budesonide 160 MICROgram(s)/formoterol 4.5 MICROgram(s) Inhaler 2 Puff(s) Inhalation two times a day  chlorhexidine 2% Cloths 1 Application(s) Topical daily  dextrose 5%. 1000 milliLiter(s) (50 mL/Hr) IV Continuous <Continuous>  dextrose 5%. 1000 milliLiter(s) (100 mL/Hr) IV Continuous <Continuous>  dextrose 50% Injectable 25 Gram(s) IV Push once  dextrose 50% Injectable 25 Gram(s) IV Push once  dextrose 50% Injectable 12.5 Gram(s) IV Push once  epoetin beverley-epbx (RETACRIT) Injectable 86759 Unit(s) IV Push <User Schedule>  folic acid 1 milliGRAM(s) Oral daily  furosemide   Injectable 40 milliGRAM(s) IV Push daily  glucagon  Injectable 1 milliGRAM(s) IntraMuscular once  hydrALAZINE Injectable 5 milliGRAM(s) IV Push three times a day  insulin glargine Injectable (LANTUS) 4 Unit(s) SubCutaneous at bedtime  latanoprost 0.005% Ophthalmic Solution 1 Drop(s) Both EYES at bedtime  levothyroxine 25 MICROGram(s) Oral daily  lidocaine   4% Patch 2 Patch Transdermal daily  metoprolol tartrate Injectable 5 milliGRAM(s) IV Push every 12 hours  pantoprazole  Injectable 40 milliGRAM(s) IV Push every 12 hours  sertraline 200 milliGRAM(s) Oral daily  sevelamer carbonate 800 milliGRAM(s) Oral three times a day with meals  sucralfate 1 Gram(s) Oral four times a day      MEDICATIONS  (PRN):  acetaminophen     Tablet .. 975 milliGRAM(s) Oral every 8 hours PRN Moderate Pain (4 - 6)  dextrose Oral Gel 15 Gram(s) Oral once PRN Blood Glucose LESS THAN 70 milliGRAM(s)/deciliter  LORazepam   Injectable 0.5 milliGRAM(s) IV Push every 4 hours PRN agitation and nausea  ondansetron   Disintegrating Tablet 4 milliGRAM(s) Oral two times a day PRN Nausea and/or Vomiting  ondansetron Injectable 4 milliGRAM(s) IV Push every 6 hours PRN Nausea and/or Vomiting      REVIEW OF SYSTEMS:                           ALL ROS DONE [ X   ]    CONSTITUTIONAL:  LETHARGIC [   ], FEVER [   ], UNRESPONSIVE [   ]  CVS:  CP  [   ], SOB, [   ], PALPITATIONS [   ], DIZZYNESS [   ]  RS: COUGH [   ], SPUTUM [   ]  GI: ABDOMINAL PAIN [   ], NAUSEA [   ], VOMITINGS [   ], DIARRHEA [   ], CONSTIPATION [   ]  :  DYSURIA [   ], NOCTURIA [   ], INCREASED FREQUENCY [   ], DRIBLING [   ],  SKELETAL: PAINFUL JOINTS [   ], SWOLLEN JOINTS [   ], NECK ACHE [   ], LOW BACK ACHE [   ],  SKIN : ULCERS [   ], RASH [   ], ITCHING [   ]  CNS: HEAD ACHE [   ], DOUBLE VISION [   ], BLURRED VISION [   ], AMS / CONFUSION [   ], SEIZURES [   ], WEAKNESS [   ],TINGLING / NUMBNESS [   ]    PHYSICAL EXAMINATION:  GENERAL APPEARANCE: NO DISTRESS,    ANASARCA ++  HEENT:  NO PALLOR, NO  JVD,  NO   NODES, NECK SUPPLE  CVS: S1 +, S2 +,   RS: AEEB,  OCCASIONAL  RALES +,   CRACKLES+ AT LUNG BASES  ABD: SOFT, NT, NO, BS +  EXT: LEFT BKA  SKIN: WARM,   SKELETAL:  ROM ACCEPTABLE  CNS:  AAO X  2-3      RADIOLOGY :    RADIOLOGY AND READINGS REVIEWED    ASSESSMENT :     Hypervolemia    No pertinent past medical history    Hypertension    Adrenal insufficiency    CKD (chronic kidney disease)    Anemia    Glaucoma    Coronary artery disease    HLD (hyperlipidemia)    Peripheral vascular disease    Spinal stenosis of lumbosacral region    Hyperparathyroidism    Diabetes mellitus    Diabetic neuropathy    Contracture of hand    Osteoarthritis    Osteoporosis    Vision loss of left eye    ESRD on hemodialysis    Cataract    BPH (benign prostatic hyperplasia)    UTI (urinary tract infection)    Bladder mass    H/O hematuria    Osteoporosis    Vision loss of right eye    Depression    Chronic GERD    Osteomyelitis of vertebra    CHF (congestive heart failure)    No significant past surgical history    Below knee amputation status, left    History of right cataract extraction    History of left cataract extraction    S/P arteriovenous (AV) fistula creation    H/O hematuria    H/O transurethral destruction of bladder lesion    History of excision of mass        PLAN:  HPI:  Patient is 62 year old male from [facility], walks with RW, with PMH of ESRD on HD (TTS), BKA- L w/prosthetic leg, DM, Left eye blindness, CHF, CAD, HTN, HLD, osteoporosis, bronchitis, current smoker, and anemia who presents with complaint of missed dialysis. Last HD 7/22. Pt states he often missed HD sessions.  patient states he missed this HD session due to mild pain in left leg, patient remains able to ambulate. Pt complains of right leg swelling and states he does not make any urine. Patient states he was having mild shortness of breath.  Pt denies any fever, chills, N/V/D, constipation, CP, numbness or tingling (26 Jul 2023 19:20)    # [8/9] MEETING HELD WITH PATIENT, BROTHER ARTEMIO @ [phone] AND SW TEAM. PATIENT W/ HISTORY OF ROUTINE NONCOMPLIANCE W/ LIFE-SUSTAINING TREATMENT [HD], EVALUATIONS AND INTERVENTIONS - COUNSELLED AT LENGTH TO REMAIN COMPLIANT. DISCUSSED THAT PROGNOSIS IS POOR/GRIM GIVEN UNDERLYING MEDICAL CONDITIONS AND GIVEN NONCOMPLIANCE. HE VERBALIZED UNDERSTANDING. REGARDING GOC - PATIENT WISHES FOR FULL CODE. PALLIATIVE CARE CONSULTED. PSYCHIATRY CONSULTED. PATIENT EXPRESSED THAT HE DOES GROW IMPATIENT DURING DIALYSIS SESSIONS AND HAS BEEN LEAVING SESSIONS SOONER THAN PRESCRIBED. IN DISCUSSION THAT RISKS OF DEFERRING COMPLETE HD - INCLUDE BUT ARE NOT LIMITED TO WORSENING CLINICAL CONDITION, ELECTROLYTE ABNORMALITIES, ARRHYTHMIA AND DEATH. HE VERBALIZED UNDERSTANDING. D/W PATIENT THAT REPEATEDLY REFUSING INTERVENTIONS AND ASSESSMENTS IS NOT IN LINE WITH HIS WISHES TO IMPROVE. PATIENT VERBALIZED UNDERSTANDING. DISCUSSED REGARDING CURRENT CLINICAL CONDITION, RECOMMENDED EVALUATIONS AND INTERVENTIONS. ALL QUESTIONS ANSWERED. TEAM HAS OFFERED PATIENT EMOTIONAL SUPPORT AND OFFERED MEDICATION FOR MOOD. OFFERED TO PRE-MEDICATE W/ ANXIOLYTIC PRIOR TO HD. PATIENT IS AGREEABLE.     DISCUSSED THAT PATIENT WILL NEED TO COMPLY WITH HD SESSIONS IN ORDER TO BE MEDICALLY OPTIMIZED FOR DISCHARGE AND FOR SAFE D/C PLANNING. TEAM DISCUSSED OPTIONS OF RETURNING TO [facility] W/ OUTPATIENT HD , IF CARE NEEDS CAN BE SAFELY MET VS. NURSING HOME/[place] . PATIENT AND BROTHER VERBALIZED UNDERSTANDING.       # ACUTE ON CHRONIC HYPOXIC RESPIRATORY FAILURE S/T PULMONARY EDEMA - S/T INCOMPLETE HD SESSIONS ; ANASARCA  # HYPERKALEMIA  # HX OF COPD + S/P RECENT MULTIFOCAL PNEUMONIA ; R/O ASPIRATION PNEUMONIA  # PULMONARY HYPERTENSION  # UNCONTROLLED HTN  # ESRD ON HD TTS - W/ HX OF NONCOMPLIANCE    - TELEMETRY  - HYDRALAZINE, METOPROLOL AND NORVASC ; PRN IV ANTIHYPERTENSIVE  - LOKELMA  - SUPPLEMENTAL O2  - PLANNED FOR HD  - NEPHROLOGY CONSULT  - PULMONOLOGY CONSULT  - ID CONSULT    - CONTINUES TO REFUSE OR PRE-MATURELY DISCONTINUE SESSIONS OF HD       - [7/30] - OVERNIGHT RAPID RESPONSE S/T HYPOXIA - SELF-LIMITED - PATIENT IMPROVED S/P O2 SUPPLEMENT  - S/P CEFEPIME + FLAGYLL, BCX - NGTD      # RECURRENT INTRACTABLE NAUSEA/VOMITING, ? COFFEE GROUND EMESIS  # GASTROPARESIS  # HISTORY OF ESOPHAGITIS AND DUODENITIS [1/2021], HX OF GASTROPARESIS W/ EVIDENCE OF THICKENED ESOPHAGUS ON PREVIOUS CT SCAN   # PANCREAS W/ DOUBLE DUCT SIGN    - MONITORING HGB, PLACED ON PPI BID , CARAFATE QID  - PRN ANTIEMETICS  ; S/P DOSE OF REGLAN [WITH MINIMAL IMPROVEMENT]  - MONITORING FOR SYMPTOMS  - WHILE ON DIET PATIENT REPEATEDLY COUNSELLED TO CHEW FOOD CAUTIOUSLY AND CONSUME SMALL BITES W/ REGULAR CLEARING WITH WATER BETWEEN BITES    - ADVANCE DIET AS TOLERATED  - NOTED CT A/P    - S/P RECENT PRBC TRANSFUSION     - GI CONSULT  - SURGERY CONSULT    # LESS LIKELY CHOLECYSTITIS  - S/P ABX  - NOTED CT A/P  - HIDA SCAN - NEGATIVE  - SURGERY CONSULT    # ELEVATED TROPONINS - ? DEMAND ISCHEMIA    - S/P TELEMETRY  - TREND TROPONINS  - ECHO - TRACE MR, MODERATELY INCREASED LV WALL THICKNESS, NORMAL LV SYSTOLIC FUNCTION, SEVERE PULMONARY HTN  - CARDIOLOGY CONSULT    # EPISODE OF HYPOGLYCEMIA - IMPROVED  # GASTROPARESIS  # UNDERLYING DM  - SSI + FS  - COUNSELLED TO COMPLY WITH HD TO REDUCE UREMIA    - REPEATEDLY COUNSELLED TO COMPLY WITH FINGERSTICKS      # DEPRESSION  - PLACED ON ZOLOFT  - PSYCHIATRY CONSULT    # PATIENT UNDERGOING OUTPATIENT WORKUP FOR CENTRAL VENOUS STENOSIS THROUGH NEPHROLOGY TEAM  - ? s/p STENT PLACEMENT    # ANEMIA OF CKD  # HX OF PANCYTOPENIA   - TREND HGB, TRANSFUSION THRESHOLD HGB < 7; PATIENT STILL INTERMITTENTLY REFUSING BLOODWORK, COUNSELLED TO COMPLY  - TYPE AND SCREEN  - ON EPO  - DENIES RECENT HEMATEMESIS, MELENA, HEMATOCHEZIA    - S/P RECENT PRBC TRANSFUSION  - S/P PRBC TRANSFUSION 7/29    - PPI BID, CARAFATE QID    # SEVERE PROTEIN CALORIE MALNUTRITION, FAILURE TO THRIVE   -  TEMPORAL WASTING, LOSS OF MUSCLE MASS FROM SHOULDER AND HIP GIRDLE  - NUTRITIONAL SUPPLEMENT    # HLD  # PARTIALLY BLIND  # S/P LEFT BKA  # HX OF PVD  # LS SPINAL STENOSIS  # GI AND DVT PPX

## 2023-08-11 NOTE — PROGRESS NOTE ADULT - SUBJECTIVE AND OBJECTIVE BOX
[facility] : Progress Note :: [phone], (office [phone]),   Dr Mosher / Dr Washington / Dr Young / Dr Doll / Dr Varsha TURCIOS / Dr Tello / Dr Garcia / Dr Larry qiu  _____________________________________________________________________________________________    scheduled for HD today     No Known Drug Allergies  fish (Rash)  liver (Anaphylaxis)    Hospital Medications:   MEDICATIONS  (STANDING):  albuterol/ipratropium for Nebulization 3 milliLiter(s) Nebulizer every 6 hours  amLODIPine   Tablet 10 milliGRAM(s) Oral daily  aspirin enteric coated 81 milliGRAM(s) Oral daily  atorvastatin 40 milliGRAM(s) Oral at bedtime  budesonide 160 MICROgram(s)/formoterol 4.5 MICROgram(s) Inhaler 2 Puff(s) Inhalation two times a day  chlorhexidine 2% Cloths 1 Application(s) Topical daily  dextrose 5%. 1000 milliLiter(s) (50 mL/Hr) IV Continuous <Continuous>  dextrose 5%. 1000 milliLiter(s) (100 mL/Hr) IV Continuous <Continuous>  dextrose 50% Injectable 25 Gram(s) IV Push once  dextrose 50% Injectable 25 Gram(s) IV Push once  dextrose 50% Injectable 12.5 Gram(s) IV Push once  epoetin beverley-epbx (RETACRIT) Injectable 58462 Unit(s) IV Push <User Schedule>  folic acid 1 milliGRAM(s) Oral daily  furosemide   Injectable 40 milliGRAM(s) IV Push daily  glucagon  Injectable 1 milliGRAM(s) IntraMuscular once  hydrALAZINE Injectable 5 milliGRAM(s) IV Push three times a day  insulin glargine Injectable (LANTUS) 4 Unit(s) SubCutaneous at bedtime  latanoprost 0.005% Ophthalmic Solution 1 Drop(s) Both EYES at bedtime  levothyroxine 25 MICROGram(s) Oral daily  lidocaine   4% Patch 2 Patch Transdermal daily  metoprolol tartrate Injectable 5 milliGRAM(s) IV Push every 12 hours  pantoprazole  Injectable 40 milliGRAM(s) IV Push every 12 hours  sertraline 200 milliGRAM(s) Oral daily  sevelamer carbonate 800 milliGRAM(s) Oral three times a day with meals  sucralfate 1 Gram(s) Oral four times a day      VITALS:  T(F): 98.2 (08-11-23 @ 13:12), Max: 99.1 (08-11-23 @ 06:49)  HR: 88 (08-11-23 @ 13:40)  BP: 133/75 (08-11-23 @ 13:40)  RR: 18 (08-11-23 @ 13:12)  SpO2: 100% (08-11-23 @ 13:12)  Wt(kg): --    08-11 @ 07:01  -  08-11 @ 15:33  --------------------------------------------------------  IN: 0 mL / OUT: 2 mL / NET: -2 mL        PHYSICAL EXAM:  Constitutional: NAD  HEENT: anicteric sclera, oropharynx clear.  Neck: No JVD  Respiratory: CTAB, no wheezes, rales or rhonchi  Cardiovascular: S1, S2, RRR  Gastrointestinal: BS+, soft, NT/ND  Extremities: No cyanosis or clubbing.  peripheral edema+  Vascular Access: AVF with thrill and bruit     LABS:  08-11    138  |  100  |  47<H>  ----------------------------<  101<H>  3.8   |  25  |  12.90<H>    Ca    8.4      11 Aug 2023 06:23  Phos  5.5     08-11  Mg     2.6     08-11      Creatinine Trend: 12.90 <--, 11.70 <--, 17.50 <--, 17.40 <--, 16.90 <--                        8.3    2.84  )-----------( 86       ( 11 Aug 2023 06:23 )             27.4     Urine Studies:  Urinalysis Basic - ( 11 Aug 2023 06:23 )    Color:  / Appearance:  / SG:  / pH:   Gluc: 101 mg/dL / Ketone:   / Bili:  / Urobili:    Blood:  / Protein:  / Nitrite:    Leuk Esterase:  / RBC:  / WBC    Sq Epi:  / Non Sq Epi:  / Bacteria:         RADIOLOGY & ADDITIONAL STUDIES:

## 2023-08-11 NOTE — PROGRESS NOTE ADULT - PROBLEM SELECTOR PLAN 3
H/o ESRD on HD (T/Th/Sat)  - Pt refused HD on 8/5.  Completed 8 minutes on HD on 8/8.  Completed 2.5h HD on 8/9.    - Last HD 8/10  - will need 4 successful HD sessions prior to D/C   - plan for HD today 8/11  - will premedicate prior to HD with Ativan 2mg IVP   - Nephro-Dr. Mosher, following

## 2023-08-11 NOTE — PROGRESS NOTE ADULT - PROBLEM SELECTOR PLAN 9
H/o Diabetes on Lantus   - A1c 6.1  - noted with episodes of hypoglycemia on 8/10    - likely 2/2 poor PO intake   - C/w Lantus  - monitor AC HS

## 2023-08-12 NOTE — CHART NOTE - NSCHARTNOTEFT_GEN_A_CORE
Informed by RN of pt IV access not functioning, unable to flush. IV  Removed.   ICU NP consulted for US guided IV access.   Pt refusing IV access. D/w pt risk of no IV access.   -Will place IV when pt is agreeable to IV placement .       Yumiko Lomas NP Medicine

## 2023-08-12 NOTE — PROGRESS NOTE ADULT - SUBJECTIVE AND OBJECTIVE BOX
Patient is a 62y old  Male who presents with a chief complaint of Missed dialysis (12 Aug 2023 14:28)    PATIENT IS SEEN AND EXAMINED IN MEDICAL FLOOR.    NGT [    ]    JEREMY [   ]      GT [   ]    ALLERGIES:  No Known Drug Allergies  fish (Rash)  liver (Anaphylaxis)      Daily     Daily Weight in k.7 (11 Aug 2023 15:45)    VITALS:    Vital Signs Last 24 Hrs  T(C): 36.9 (12 Aug 2023 12:36), Max: 37.3 (11 Aug 2023 20:29)  T(F): 98.4 (12 Aug 2023 12:36), Max: 99.1 (11 Aug 2023 20:29)  HR: 96 (12 Aug 2023 14:10) (92 - 107)  BP: 167/74 (12 Aug 2023 14:10) (98/58 - 169/83)  BP(mean): --  RR: 18 (12 Aug 2023 12:36) (18 - 20)  SpO2: 95% (12 Aug 2023 12:36) (93% - 99%)    Parameters below as of 12 Aug 2023 12:36  Patient On (Oxygen Delivery Method): room air        LABS:    CBC Full  -  ( 11 Aug 2023 06:23 )  WBC Count : 2.84 K/uL  RBC Count : 3.03 M/uL  Hemoglobin : 8.3 g/dL  Hematocrit : 27.4 %  Platelet Count - Automated : 86 K/uL  Mean Cell Volume : 90.4 fl  Mean Cell Hemoglobin : 27.4 pg  Mean Cell Hemoglobin Concentration : 30.3 gm/dL  Auto Neutrophil # : x  Auto Lymphocyte # : x  Auto Monocyte # : x  Auto Eosinophil # : x  Auto Basophil # : x  Auto Neutrophil % : x  Auto Lymphocyte % : x  Auto Monocyte % : x  Auto Eosinophil % : x  Auto Basophil % : x      08-11    138  |  100  |  47<H>  ----------------------------<  101<H>  3.8   |  25  |  12.90<H>    Ca    8.4      11 Aug 2023 06:23  Phos  5.5     08-11  Mg     2.6     08-11      CAPILLARY BLOOD GLUCOSE      POCT Blood Glucose.: 137 mg/dL (12 Aug 2023 12:56)  POCT Blood Glucose.: 64 mg/dL (12 Aug 2023 11:35)  POCT Blood Glucose.: 97 mg/dL (12 Aug 2023 07:51)  POCT Blood Glucose.: 265 mg/dL (11 Aug 2023 21:45)          Creatinine Trend: 12.90<--, 11.70<--, 17.50<--, 17.40<--, 16.90<--, 15.40<--  I&O's Summary    11 Aug 2023 07:01  -  12 Aug 2023 07:00  --------------------------------------------------------  IN: 0 mL / OUT: 5 mL / NET: -5 mL            .Blood Blood-Peripheral  05- @ 14:07   No Growth Final  --  --          MEDICATIONS:    MEDICATIONS  (STANDING):  albuterol    90 MICROgram(s) HFA Inhaler 2 Puff(s) Inhalation every 6 hours  amLODIPine   Tablet 10 milliGRAM(s) Oral daily  aspirin enteric coated 81 milliGRAM(s) Oral daily  atorvastatin 40 milliGRAM(s) Oral at bedtime  budesonide 160 MICROgram(s)/formoterol 4.5 MICROgram(s) Inhaler 2 Puff(s) Inhalation two times a day  chlorhexidine 2% Cloths 1 Application(s) Topical daily  dextrose 5%. 1000 milliLiter(s) (50 mL/Hr) IV Continuous <Continuous>  dextrose 5%. 1000 milliLiter(s) (100 mL/Hr) IV Continuous <Continuous>  dextrose 50% Injectable 25 Gram(s) IV Push once  dextrose 50% Injectable 25 Gram(s) IV Push once  dextrose 50% Injectable 12.5 Gram(s) IV Push once  epoetin beverley-epbx (RETACRIT) Injectable 44615 Unit(s) IV Push <User Schedule>  folic acid 1 milliGRAM(s) Oral daily  furosemide   Injectable 40 milliGRAM(s) IV Push daily  glucagon  Injectable 1 milliGRAM(s) IntraMuscular once  hydrALAZINE 25 milliGRAM(s) Oral three times a day  latanoprost 0.005% Ophthalmic Solution 1 Drop(s) Both EYES at bedtime  levothyroxine 25 MICROGram(s) Oral daily  lidocaine   4% Patch 2 Patch Transdermal daily  metoprolol succinate ER 50 milliGRAM(s) Oral daily  pantoprazole    Tablet 40 milliGRAM(s) Oral before breakfast  sertraline 200 milliGRAM(s) Oral daily  sevelamer carbonate 800 milliGRAM(s) Oral three times a day with meals  sucralfate 1 Gram(s) Oral four times a day      MEDICATIONS  (PRN):  acetaminophen     Tablet .. 975 milliGRAM(s) Oral every 8 hours PRN Moderate Pain (4 - 6)  dextrose Oral Gel 15 Gram(s) Oral once PRN Blood Glucose LESS THAN 70 milliGRAM(s)/deciliter  ondansetron   Disintegrating Tablet 4 milliGRAM(s) Oral two times a day PRN Nausea and/or Vomiting  ondansetron Injectable 4 milliGRAM(s) IV Push every 6 hours PRN Nausea and/or Vomiting        REVIEW OF SYSTEMS:                           ALL ROS DONE [ X   ]      CONSTITUTIONAL:  LETHARGIC [   ], FEVER [   ], UNRESPONSIVE [   ]  CVS:  CP  [   ], SOB, [   ], PALPITATIONS [   ], DIZZYNESS [   ]  RS: COUGH [   ], SPUTUM [   ]  GI: ABDOMINAL PAIN [   ], NAUSEA [   ], VOMITINGS [   ], DIARRHEA [   ], CONSTIPATION [   ]  :  DYSURIA [   ], NOCTURIA [   ], INCREASED FREQUENCY [   ], DRIBLING [   ],  SKELETAL: PAINFUL JOINTS [   ], SWOLLEN JOINTS [   ], NECK ACHE [   ], LOW BACK ACHE [   ],  SKIN : ULCERS [   ], RASH [   ], ITCHING [   ]  CNS: HEAD ACHE [   ], DOUBLE VISION [   ], BLURRED VISION [   ], AMS / CONFUSION [   ], SEIZURES [   ], WEAKNESS [   ],TINGLING / NUMBNESS [   ]      PHYSICAL EXAMINATION:    GENERAL APPEARANCE: NO DISTRESS,    ANASARCA ++  HEENT:  NO PALLOR, NO  JVD,  NO   NODES, NECK SUPPLE  CVS: S1 +, S2 +,   RS: AEEB,  OCCASIONAL  RALES +,   CRACKLES+ AT LUNG BASES  ABD: SOFT, NT, NO, BS +  EXT: LEFT BKA  SKIN: WARM,   SKELETAL:  ROM ACCEPTABLE  CNS:  AAO X  2-3        RADIOLOGY :    RADIOLOGY AND READINGS REVIEWED          ASSESSMENT :     Hypervolemia    No pertinent past medical history    Hypertension    Adrenal insufficiency    CKD (chronic kidney disease)    Anemia    Glaucoma    Coronary artery disease    HLD (hyperlipidemia)    Peripheral vascular disease    Spinal stenosis of lumbosacral region    Hyperparathyroidism    Diabetes mellitus    Diabetic neuropathy    Contracture of hand    Osteoarthritis    Osteoporosis    Vision loss of left eye    ESRD on hemodialysis    Cataract    BPH (benign prostatic hyperplasia)    UTI (urinary tract infection)    Bladder mass    H/O hematuria    Osteoporosis    Vision loss of right eye    Depression    Chronic GERD    Osteomyelitis of vertebra    CHF (congestive heart failure)    No significant past surgical history    Below knee amputation status, left    History of right cataract extraction    History of left cataract extraction    S/P arteriovenous (AV) fistula creation    H/O hematuria    H/O transurethral destruction of bladder lesion    History of excision of mass        PLAN:  HPI:  Patient is 62 year old male from [facility], walks with RW, with PMH of ESRD on HD (TTS), BKA- L w/prosthetic leg, DM, Left eye blindness, CHF, CAD, HTN, HLD, osteoporosis, bronchitis, current smoker, and anemia who presents with complaint of missed dialysis. Last HD . Pt states he often missed HD sessions.  patient states he missed this HD session due to mild pain in left leg, patient remains able to ambulate. Pt complains of right leg swelling and states he does not make any urine. Patient states he was having mild shortness of breath.  Pt denies any fever, chills, N/V/D, constipation, CP, numbness or tingling (2023 19:20)    # [8/9] MEETING HELD WITH PATIENT, BROTHER ARTEMIO @ [phone] AND SW TEAM. PATIENT W/ HISTORY OF ROUTINE NONCOMPLIANCE W/ LIFE-SUSTAINING TREATMENT [HD], EVALUATIONS AND INTERVENTIONS - COUNSELLED AT LENGTH TO REMAIN COMPLIANT. DISCUSSED THAT PROGNOSIS IS POOR/GRIM GIVEN UNDERLYING MEDICAL CONDITIONS AND GIVEN NONCOMPLIANCE. HE VERBALIZED UNDERSTANDING. REGARDING GOC - PATIENT WISHES FOR FULL CODE. PALLIATIVE CARE CONSULTED. PSYCHIATRY CONSULTED. PATIENT EXPRESSED THAT HE DOES GROW IMPATIENT DURING DIALYSIS SESSIONS AND HAS BEEN LEAVING SESSIONS SOONER THAN PRESCRIBED. IN DISCUSSION THAT RISKS OF DEFERRING COMPLETE HD - INCLUDE BUT ARE NOT LIMITED TO WORSENING CLINICAL CONDITION, ELECTROLYTE ABNORMALITIES, ARRHYTHMIA AND DEATH. HE VERBALIZED UNDERSTANDING. D/W PATIENT THAT REPEATEDLY REFUSING INTERVENTIONS AND ASSESSMENTS IS NOT IN LINE WITH HIS WISHES TO IMPROVE. PATIENT VERBALIZED UNDERSTANDING. DISCUSSED REGARDING CURRENT CLINICAL CONDITION, RECOMMENDED EVALUATIONS AND INTERVENTIONS. ALL QUESTIONS ANSWERED. TEAM HAS OFFERED PATIENT EMOTIONAL SUPPORT AND OFFERED MEDICATION FOR MOOD. OFFERED TO PRE-MEDICATE W/ ANXIOLYTIC PRIOR TO HD. PATIENT IS AGREEABLE.     DISCUSSED THAT PATIENT WILL NEED TO COMPLY WITH HD SESSIONS IN ORDER TO BE MEDICALLY OPTIMIZED FOR DISCHARGE AND FOR SAFE D/C PLANNING. TEAM DISCUSSED OPTIONS OF RETURNING TO [facility] W/ OUTPATIENT HD , IF CARE NEEDS CAN BE SAFELY MET VS. NURSING HOME/[facility] . PATIENT AND BROTHER VERBALIZED UNDERSTANDING.       # ACUTE ON CHRONIC HYPOXIC RESPIRATORY FAILURE S/T PULMONARY EDEMA - S/T INCOMPLETE HD SESSIONS ; ANASARCA  # HYPERKALEMIA  # HX OF COPD + S/P RECENT MULTIFOCAL PNEUMONIA ; R/O ASPIRATION PNEUMONIA  # PULMONARY HYPERTENSION  # UNCONTROLLED HTN  # ESRD ON HD TTS - W/ HX OF NONCOMPLIANCE    - TELEMETRY  - HYDRALAZINE, METOPROLOL AND NORVASC ; PRN IV ANTIHYPERTENSIVE  - LOKELMA  - SUPPLEMENTAL O2  - PLANNED FOR HD  - NEPHROLOGY CONSULT  - PULMONOLOGY CONSULT  - ID CONSULT    - CONTINUES TO REFUSE OR PRE-MATURELY DISCONTINUE SESSIONS OF HD       - [] - OVERNIGHT RAPID RESPONSE S/T HYPOXIA - SELF-LIMITED - PATIENT IMPROVED S/P O2 SUPPLEMENT  - S/P CEFEPIME + FLAGYLL, BCX - NGTD      # RECURRENT INTRACTABLE NAUSEA/VOMITING, ? COFFEE GROUND EMESIS  # GASTROPARESIS  # HISTORY OF ESOPHAGITIS AND DUODENITIS [2021], HX OF GASTROPARESIS W/ EVIDENCE OF THICKENED ESOPHAGUS ON PREVIOUS CT SCAN   # PANCREAS W/ DOUBLE DUCT SIGN    - MONITORING HGB, PLACED ON PPI BID , CARAFATE QID  - PRN ANTIEMETICS  ; S/P DOSE OF REGLAN [WITH MINIMAL IMPROVEMENT]  - MONITORING FOR SYMPTOMS  - WHILE ON DIET PATIENT REPEATEDLY COUNSELLED TO CHEW FOOD CAUTIOUSLY AND CONSUME SMALL BITES W/ REGULAR CLEARING WITH WATER BETWEEN BITES    - ADVANCE DIET AS TOLERATED  - NOTED CT A/P    - S/P RECENT PRBC TRANSFUSION     - GI CONSULT  - SURGERY CONSULT    # LESS LIKELY CHOLECYSTITIS  - S/P ABX  - NOTED CT A/P  - HIDA SCAN - NEGATIVE  - SURGERY CONSULT    # ELEVATED TROPONINS - ? DEMAND ISCHEMIA    - S/P TELEMETRY  - TREND TROPONINS  - ECHO - TRACE MR, MODERATELY INCREASED LV WALL THICKNESS, NORMAL LV SYSTOLIC FUNCTION, SEVERE PULMONARY HTN  - CARDIOLOGY CONSULT    # EPISODE OF HYPOGLYCEMIA - IMPROVED  # GASTROPARESIS  # UNDERLYING DM  - SSI + FS  - COUNSELLED TO COMPLY WITH HD TO REDUCE UREMIA    - REPEATEDLY COUNSELLED TO COMPLY WITH FINGERSTICKS      # DEPRESSION  - PLACED ON ZOLOFT  - PSYCHIATRY CONSULT    # PATIENT UNDERGOING OUTPATIENT WORKUP FOR CENTRAL VENOUS STENOSIS THROUGH NEPHROLOGY TEAM  - ? s/p STENT PLACEMENT    # ANEMIA OF CKD  # HX OF PANCYTOPENIA   - TREND HGB, TRANSFUSION THRESHOLD HGB < 7; PATIENT STILL INTERMITTENTLY REFUSING BLOODWORK, COUNSELLED TO COMPLY  - TYPE AND SCREEN  - ON EPO  - DENIES RECENT HEMATEMESIS, MELENA, HEMATOCHEZIA    - S/P RECENT PRBC TRANSFUSION  - S/P PRBC TRANSFUSION     - PPI BID, CARAFATE QID    # SEVERE PROTEIN CALORIE MALNUTRITION, FAILURE TO THRIVE   -  TEMPORAL WASTING, LOSS OF MUSCLE MASS FROM SHOULDER AND HIP GIRDLE  - NUTRITIONAL SUPPLEMENT    # HLD  # PARTIALLY BLIND  # S/P LEFT BKA  # HX OF PVD  # LS SPINAL STENOSIS  # GI AND DVT PPX    DR. COLIN MAC

## 2023-08-12 NOTE — PROGRESS NOTE ADULT - ASSESSMENT
# ESRD. had 3 hrs of HD on wednesday.   [facility]-HD [facility]  HD on monday.  D/W pt compliance with HD as prescribed.  # anemia of CKD. epogen on HD. transfuse for HBG <7  #CKDMBD. phos at goal

## 2023-08-12 NOTE — PROGRESS NOTE ADULT - SUBJECTIVE AND OBJECTIVE BOX
Time of Visit:  Patient seen and examined. pat is siting in bed comfortable     MEDICATIONS  (STANDING):  albuterol    90 MICROgram(s) HFA Inhaler 2 Puff(s) Inhalation every 6 hours  amLODIPine   Tablet 10 milliGRAM(s) Oral daily  aspirin enteric coated 81 milliGRAM(s) Oral daily  atorvastatin 40 milliGRAM(s) Oral at bedtime  budesonide 160 MICROgram(s)/formoterol 4.5 MICROgram(s) Inhaler 2 Puff(s) Inhalation two times a day  chlorhexidine 2% Cloths 1 Application(s) Topical daily  dextrose 5%. 1000 milliLiter(s) (50 mL/Hr) IV Continuous <Continuous>  dextrose 5%. 1000 milliLiter(s) (100 mL/Hr) IV Continuous <Continuous>  dextrose 50% Injectable 25 Gram(s) IV Push once  dextrose 50% Injectable 12.5 Gram(s) IV Push once  dextrose 50% Injectable 25 Gram(s) IV Push once  epoetin beverley-epbx (RETACRIT) Injectable 61720 Unit(s) IV Push <User Schedule>  folic acid 1 milliGRAM(s) Oral daily  furosemide   Injectable 40 milliGRAM(s) IV Push daily  glucagon  Injectable 1 milliGRAM(s) IntraMuscular once  hydrALAZINE 25 milliGRAM(s) Oral three times a day  latanoprost 0.005% Ophthalmic Solution 1 Drop(s) Both EYES at bedtime  levothyroxine 25 MICROGram(s) Oral daily  lidocaine   4% Patch 2 Patch Transdermal daily  metoprolol succinate ER 50 milliGRAM(s) Oral daily  pantoprazole    Tablet 40 milliGRAM(s) Oral before breakfast  sertraline 200 milliGRAM(s) Oral daily  sevelamer carbonate 800 milliGRAM(s) Oral three times a day with meals  sucralfate 1 Gram(s) Oral four times a day      MEDICATIONS  (PRN):  acetaminophen     Tablet .. 975 milliGRAM(s) Oral every 8 hours PRN Moderate Pain (4 - 6)  dextrose Oral Gel 15 Gram(s) Oral once PRN Blood Glucose LESS THAN 70 milliGRAM(s)/deciliter  ondansetron   Disintegrating Tablet 4 milliGRAM(s) Oral two times a day PRN Nausea and/or Vomiting  ondansetron Injectable 4 milliGRAM(s) IV Push every 6 hours PRN Nausea and/or Vomiting       Medications up to date at time of exam.      PHYSICAL EXAMINATION:  Patient has no new complaints.  GENERAL: The patient is a well-developed, well-nourished, in no apparent distress.     Vital Signs Last 24 Hrs  T(C): 36.9 (12 Aug 2023 12:36), Max: 37.3 (11 Aug 2023 20:29)  T(F): 98.4 (12 Aug 2023 12:36), Max: 99.1 (11 Aug 2023 20:29)  HR: 96 (12 Aug 2023 14:10) (92 - 107)  BP: 167/74 (12 Aug 2023 14:10) (98/58 - 169/83)  BP(mean): --  RR: 18 (12 Aug 2023 12:36) (18 - 20)  SpO2: 95% (12 Aug 2023 12:36) (93% - 99%)    Parameters below as of 12 Aug 2023 12:36  Patient On (Oxygen Delivery Method): room air       (if applicable)    Chest Tube (if applicable)    HEENT: Head is normocephalic and atraumatic. Extraocular muscles are intact. Mucous membranes are moist.     NECK: Supple, no palpable adenopathy.    LUNGS: Clear to auscultation, no wheezing, rales, or rhonchi.    HEART: Regular rate and rhythm without murmur.    ABDOMEN: Soft, nontender, and nondistended.  No hepatosplenomegaly is noted.    : No painful voiding, no pelvic pain    EXTREMITIES: L BKA     NEUROLOGIC: Awake, alert,     SKIN: Warm, dry, good turgor.      LABS:                        8.3    2.84  )-----------( 86       ( 11 Aug 2023 06:23 )             27.4     08-11    138  |  100  |  47<H>  ----------------------------<  101<H>  3.8   |  25  |  12.90<H>    Ca    8.4      11 Aug 2023 06:23  Phos  5.5     08-11  Mg     2.6     08-11        Urinalysis Basic - ( 11 Aug 2023 06:23 )    Color: x / Appearance: x / SG: x / pH: x  Gluc: 101 mg/dL / Ketone: x  / Bili: x / Urobili: x   Blood: x / Protein: x / Nitrite: x   Leuk Esterase: x / RBC: x / WBC x   Sq Epi: x / Non Sq Epi: x / Bacteria: x                      MICROBIOLOGY: (if applicable)    RADIOLOGY & ADDITIONAL STUDIES:  EKG:   CXR:  ECHO:    IMPRESSION: 62y Male 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      Osteoarthritis      Vision loss of left eye  blind      ESRD on hemodialysis  T/Th/S      Cataract  both eyes - hx of sx done      BPH (benign prostatic hyperplasia)      UTI (urinary tract infection)  hx of      Bladder mass  hx of      H/O hematuria      Osteoporosis      Vision loss of right eye  decreased      Depression      Chronic GERD      Osteomyelitis of vertebra      CHF (congestive heart failure)      Below knee amputation status, left  2012- pt is wearing prostesis      History of right cataract extraction      History of left cataract extraction      S/P arteriovenous (AV) fistula creation  right arm brachiocephalic arteriovenous fistula on 11/08/2018      H/O hematuria  s/p bladder bx and fulguration 2/25/2020      H/O transurethral destruction of bladder lesion  2020      History of excision of mass  back mass on 03/31/2021       p/w           Impression: This is a 62 yr old man  from [facility] with ESRD on HD ( T-Th-Sat ). Current smoker . Presented to ED due to Missed Dialysis, last HD 07-22-23 . Per Patient stated that he often missed HD sessions due to Mild left leg pain and right leg swelling . S/p RRT for SOB with Hypoxia due to Acute Hypoxic Respiratory Failure secondary to Pulmonary edema/ Fluid overload due to Missed Dialysis . CT Abdomen with Small Bilateral Pleural Effusions, Rt Lower Lung with groundglass opacity. No bowel obstruction. Small Gall Stone.   Vomiting due to diabetic gastroparesis vs acute cholecystitis       Suggestions:  - Scheduled for HD today , had full session yesterday   - Refusing iv access   - Limit fluid intake   - Continue O2 Supp as needed to maintain sat >90%   - Continue Duoneb via nebulization Q 6 Hours .   - Reinforced the importance of compliance to Dialysis schedule.   - Aspiration precaution with HOB elevation especially during meal times.

## 2023-08-12 NOTE — CHART NOTE - NSCHARTNOTEFT_GEN_A_CORE
EVENT:    SUBJECTIVE:    OBJECTIVE:  Vital Signs Last 24 Hrs  T(C): 36.9 (12 Aug 2023 05:35), Max: 37.3 (11 Aug 2023 20:29)  T(F): 98.4 (12 Aug 2023 05:35), Max: 99.1 (11 Aug 2023 20:29)  HR: 93 (12 Aug 2023 05:35) (88 - 107)  BP: 169/83 (12 Aug 2023 05:35) (98/58 - 169/83)  BP(mean): --  RR: 18 (12 Aug 2023 05:35) (18 - 20)  SpO2: 96% (12 Aug 2023 05:35) (93% - 100%)    Parameters below as of 12 Aug 2023 05:35  Patient On (Oxygen Delivery Method): nasal cannula  O2 Flow (L/min): 3      LABS:                        8.3    2.84  )-----------( 86       ( 11 Aug 2023 06:23 )             27.4     08-11    138  |  100  |  47<H>  ----------------------------<  101<H>  3.8   |  25  |  12.90<H>    Ca    8.4      11 Aug 2023 06:23  Phos  5.5     08-11  Mg     2.6     08-11        EKG:   IMAGING:    ASSESSMENT:  HPI:  Patient is 62 year old male from [facility], walks with RW, with PMH of ESRD on HD (TTS), BKA- L w/prosthetic leg, DM, Left eye blindness, CHF, CAD, HTN, HLD, osteoporosis, bronchitis, current smoker, and anemia who presents with complaint of missed dialysis. Last HD 7/22. Pt states he often missed HD sessions.  patient states he missed this HD session due to mild pain in left leg, patient remains able to ambulate. Pt complains of right leg swelling and states he does not make any urine. Patient states he was having mild shortness of breath.  Pt denies any fever, chills, N/V/D, constipation, CP, numbness or tingling (26 Jul 2023 19:20)      PLAN: EVENT:Notified by RN of glucose 64.    SUBJECTIVE:Pt seen and examined . Pt AAOX3, endorses feeling cold, nauseous. Pt denies weakness, vomiting. Pt anxious to go home.     OBJECTIVE:  Vital Signs Last 24 Hrs  T(C): 36.9 (12 Aug 2023 05:35), Max: 37.3 (11 Aug 2023 20:29)  T(F): 98.4 (12 Aug 2023 05:35), Max: 99.1 (11 Aug 2023 20:29)  HR: 93 (12 Aug 2023 05:35) (88 - 107)  BP: 169/83 (12 Aug 2023 05:35) (98/58 - 169/83)  BP(mean): --  RR: 18 (12 Aug 2023 05:35) (18 - 20)  SpO2: 96% (12 Aug 2023 05:35) (93% - 100%)    Parameters below as of 12 Aug 2023 05:35  Patient On (Oxygen Delivery Method): nasal cannula  O2 Flow (L/min): 3      Gen: NAD  HEENT: PERRL, anicteric, no oral mucositis  Resp: Clear breath sounds on auscultation. No rales, no wheezing  CVS: S1S2 present, regular  GI: BS(+), Soft, ND, NT  Extremities : BLE No edema or calf tenderness. PPP  Neuro: Awake/alert.  no new neuro deficits  Skin: warm,dry,      LABS:                        8.3    2.84  )-----------( 86       ( 11 Aug 2023 06:23 )             27.4     08-11    138  |  100  |  47<H>  ----------------------------<  101<H>  3.8   |  25  |  12.90<H>    Ca    8.4      11 Aug 2023 06:23  Phos  5.5     08-11  Mg     2.6     08-11            ASSESSMENT:  HPI:  62 year old male from [facility], walks with RW, with PMH of ESRD on HD (TTS), BKA- L w/prosthetic leg, DM, Left eye blindness, CHF, CAD, HTN, HLD, osteoporosis, bronchitis, current smoker, and anemia who presents with complaint of missed dialysis and does not make any urine. Patient states he was having shortness of breath. Patient admitted to telemetry for Acute Hyperkalemia 2/2 missed dialysis found to have pulmonary edema and BNP [id number]. Cardiology and nephro following. Hospital course complicated by hypoglycemia, and rapid response for AHRF     Now with recurrent hyoglycemia in setting of poor intake 2/2 nausea.     PLAN:    -Follow hypoglycemia protocol .   -Discontinue Lantus for now. Resume when pt tolerating po.     Yumiko Lomas NP Medicine.

## 2023-08-12 NOTE — PROGRESS NOTE ADULT - SUBJECTIVE AND OBJECTIVE BOX
[facility] : Progress Note :: [phone], (office [phone]),   Dr Mosher / Dr Washington / Dr Young / Dr Doll / Dr Varsha TURCIOS / Dr Tello / Dr Garcia / Dr Larry qiu  _____________________________________________________________________________________________    s/p HD yesterday.    No Known Drug Allergies  fish (Rash)  liver (Anaphylaxis)    Hospital Medications:   MEDICATIONS  (STANDING):  albuterol    90 MICROgram(s) HFA Inhaler 2 Puff(s) Inhalation every 6 hours  amLODIPine   Tablet 10 milliGRAM(s) Oral daily  aspirin enteric coated 81 milliGRAM(s) Oral daily  atorvastatin 40 milliGRAM(s) Oral at bedtime  budesonide 160 MICROgram(s)/formoterol 4.5 MICROgram(s) Inhaler 2 Puff(s) Inhalation two times a day  chlorhexidine 2% Cloths 1 Application(s) Topical daily  dextrose 5%. 1000 milliLiter(s) (50 mL/Hr) IV Continuous <Continuous>  dextrose 5%. 1000 milliLiter(s) (100 mL/Hr) IV Continuous <Continuous>  dextrose 50% Injectable 25 Gram(s) IV Push once  dextrose 50% Injectable 25 Gram(s) IV Push once  dextrose 50% Injectable 12.5 Gram(s) IV Push once  epoetin beverley-epbx (RETACRIT) Injectable 71890 Unit(s) IV Push <User Schedule>  folic acid 1 milliGRAM(s) Oral daily  furosemide   Injectable 40 milliGRAM(s) IV Push daily  glucagon  Injectable 1 milliGRAM(s) IntraMuscular once  hydrALAZINE 25 milliGRAM(s) Oral three times a day  latanoprost 0.005% Ophthalmic Solution 1 Drop(s) Both EYES at bedtime  levothyroxine 25 MICROGram(s) Oral daily  lidocaine   4% Patch 2 Patch Transdermal daily  melatonin 3 milliGRAM(s) Oral at bedtime  metoprolol succinate ER 50 milliGRAM(s) Oral daily  pantoprazole    Tablet 40 milliGRAM(s) Oral before breakfast  sertraline 200 milliGRAM(s) Oral daily  sevelamer carbonate 800 milliGRAM(s) Oral three times a day with meals  sucralfate 1 Gram(s) Oral four times a day      VITALS:  T(F): 99.1 (08-12-23 @ 20:47), Max: 99.1 (08-12-23 @ 20:47)  HR: 90 (08-12-23 @ 20:47)  BP: 160/68 (08-12-23 @ 20:47)  RR: 18 (08-12-23 @ 20:47)  SpO2: 96% (08-12-23 @ 20:47)  Wt(kg): --    08-11 @ 07:01  -  08-12 @ 07:00  --------------------------------------------------------  IN: 0 mL / OUT: 5 mL / NET: -5 mL        PHYSICAL EXAM:  Constitutional: NAD  HEENT: anicteric sclera, oropharynx clear.  Neck: No JVD  Respiratory: CTAB, no wheezes, rales or rhonchi  Cardiovascular: S1, S2, RRR  Gastrointestinal: BS+, soft, NT/ND  Extremities: No peripheral edema  Neurological: A/O x 3, no focal deficits  : No CVA tenderness. No [place].   Skin: No rashes  Vascular Access: AVF with thrill and bruit     LABS:  08-11    138  |  100  |  47<H>  ----------------------------<  101<H>  3.8   |  25  |  12.90<H>    Ca    8.4      11 Aug 2023 06:23  Phos  5.5     08-11  Mg     2.6     08-11      Creatinine Trend: 12.90 <--, 11.70 <--, 17.50 <--, 17.40 <--                        8.3    2.84  )-----------( 86       ( 11 Aug 2023 06:23 )             27.4     Urine Studies:  Urinalysis Basic - ( 11 Aug 2023 06:23 )    Color:  / Appearance:  / SG:  / pH:   Gluc: 101 mg/dL / Ketone:   / Bili:  / Urobili:    Blood:  / Protein:  / Nitrite:    Leuk Esterase:  / RBC:  / WBC    Sq Epi:  / Non Sq Epi:  / Bacteria:         RADIOLOGY & ADDITIONAL STUDIES:

## 2023-08-12 NOTE — PROGRESS NOTE ADULT - SUBJECTIVE AND OBJECTIVE BOX
DATE OF SERVICE: 08-12-23       no chest pain ,       acetaminophen     Tablet .. 975 milliGRAM(s) Oral every 8 hours PRN  albuterol/ipratropium for Nebulization 3 milliLiter(s) Nebulizer every 6 hours  amLODIPine   Tablet 10 milliGRAM(s) Oral daily  aspirin enteric coated 81 milliGRAM(s) Oral daily  atorvastatin 40 milliGRAM(s) Oral at bedtime  budesonide 160 MICROgram(s)/formoterol 4.5 MICROgram(s) Inhaler 2 Puff(s) Inhalation two times a day  chlorhexidine 2% Cloths 1 Application(s) Topical daily  dextrose 5%. 1000 milliLiter(s) IV Continuous <Continuous>  dextrose 5%. 1000 milliLiter(s) IV Continuous <Continuous>  dextrose 50% Injectable 25 Gram(s) IV Push once  dextrose 50% Injectable 25 Gram(s) IV Push once  dextrose 50% Injectable 12.5 Gram(s) IV Push once  dextrose Oral Gel 15 Gram(s) Oral once PRN  epoetin beverley-epbx (RETACRIT) Injectable 09286 Unit(s) IV Push <User Schedule>  folic acid 1 milliGRAM(s) Oral daily  furosemide   Injectable 40 milliGRAM(s) IV Push daily  glucagon  Injectable 1 milliGRAM(s) IntraMuscular once  hydrALAZINE Injectable 5 milliGRAM(s) IV Push three times a day  insulin glargine Injectable (LANTUS) 4 Unit(s) SubCutaneous at bedtime  latanoprost 0.005% Ophthalmic Solution 1 Drop(s) Both EYES at bedtime  levothyroxine 25 MICROGram(s) Oral daily  lidocaine   4% Patch 2 Patch Transdermal daily  metoprolol tartrate Injectable 5 milliGRAM(s) IV Push every 12 hours  ondansetron   Disintegrating Tablet 4 milliGRAM(s) Oral two times a day PRN  ondansetron Injectable 4 milliGRAM(s) IV Push every 6 hours PRN  pantoprazole  Injectable 40 milliGRAM(s) IV Push every 12 hours  sertraline 200 milliGRAM(s) Oral daily  sevelamer carbonate 800 milliGRAM(s) Oral three times a day with meals  sucralfate 1 Gram(s) Oral four times a day                            8.3    2.84  )-----------( 86       ( 11 Aug 2023 06:23 )             27.4       Hemoglobin: 8.3 g/dL (08-11 @ 06:23)  Hemoglobin: 8.0 g/dL (08-10 @ 06:21)  Hemoglobin: 7.6 g/dL (08-09 @ 05:50)  Hemoglobin: 7.1 g/dL (08-08 @ 07:05)      08-11    138  |  100  |  47<H>  ----------------------------<  101<H>  3.8   |  25  |  12.90<H>    Ca    8.4      11 Aug 2023 06:23  Phos  5.5     08-11  Mg     2.6     08-11      Creatinine Trend: 12.90<--, 11.70<--, 17.50<--, 17.40<--, 16.90<--, 15.40<--    COAGS:           T(C): 36.9 (08-12-23 @ 05:35), Max: 37.3 (08-11-23 @ 20:29)  HR: 93 (08-12-23 @ 05:35) (88 - 107)  BP: 169/83 (08-12-23 @ 05:35) (98/58 - 169/83)  RR: 18 (08-12-23 @ 05:35) (18 - 20)  SpO2: 96% (08-12-23 @ 05:35) (93% - 100%)  Wt(kg): --    I&O's Summary    11 Aug 2023 07:01  -  12 Aug 2023 07:00  --------------------------------------------------------  IN: 0 mL / OUT: 5 mL / NET: -5 mL        HEENT:  (-)icterus (-)pallor  CV: N S1 S2 1/6 DIANA (+)2 Pulses B/l  Resp:  Clear to ausculatation B/L, normal effort  GI: (+) BS Soft, NT, ND  Lymph:  (-)Edema, (-)obvious lymphadenopathy  Skin: Warm to touch, Normal turgor  Psych: Appropriate mood and affect         ASSESSMENT/PLAN: 	62y Male  [place], walks with RW, with PMH of ESRD on HD (TTS), BKA- L w/prosthetic leg, DM, Left eye blindness, CHF,, HTN, HLD, EF 45-50%, normal perfusion 4/23 osteoporosis, bronchitis, current smoker, and anemia who presents with complaint of missed dialysis.     # CHF  - Due to missed HD  - cont diuretics per renal   - HD per renal    # Positive trop  - nothing to suggest ACS.  His trop has been higher in the past and demonstrated normal perfusion on Stress 4/23  - ECHO noted, normal LV fx severe pulm HTN, cont to keep net negative     # Noncompliance  - Behavioral health f/u  - refusing meds and blood tests as well as HD at times     # Smoking  - Cessation stressed    # HTN  - Suspect BP will improve once euvolemic

## 2023-08-13 NOTE — PROGRESS NOTE ADULT - SUBJECTIVE AND OBJECTIVE BOX
Patient is a 62y old  Male who presents with a chief complaint of Missed dialysis (13 Aug 2023 10:26)    PATIENT IS SEEN AND EXAMINED IN MEDICAL FLOOR.    MARIIT [    ]    JEREMY [   ]      GT [   ]    ALLERGIES:  No Known Drug Allergies  fish (Rash)  liver (Anaphylaxis)      Daily     Daily Weight in k.5 (13 Aug 2023 05:26)    VITALS:    Vital Signs Last 24 Hrs  T(C): 37 (13 Aug 2023 05:), Max: 37.3 (12 Aug 2023 20:47)  T(F): 98.6 (13 Aug 2023 05:), Max: 99.1 (12 Aug 2023 20:47)  HR: 86 (13 Aug 2023 05:) (86 - 97)  BP: 149/72 (13 Aug 2023 05:) (149/72 - 167/74)  BP(mean): --  RR: 18 (13 Aug 2023 05:) (18 - 18)  SpO2: 93% (13 Aug 2023 05:) (93% - 100%)    Parameters below as of 13 Aug 2023 05:26  Patient On (Oxygen Delivery Method): nasal cannula  O2 Flow (L/min): 2      LABS:              CAPILLARY BLOOD GLUCOSE      POCT Blood Glucose.: 118 mg/dL (13 Aug 2023 11:25)  POCT Blood Glucose.: 123 mg/dL (13 Aug 2023 08:02)  POCT Blood Glucose.: 102 mg/dL (12 Aug 2023 21:18)  POCT Blood Glucose.: 105 mg/dL (12 Aug 2023 16:33)  POCT Blood Glucose.: 137 mg/dL (12 Aug 2023 12:56)          Creatinine Trend: 12.90<--, 11.70<--, 17.50<--, 17.40<--, 16.90<--, 15.40<--  I&O's Summary          .Blood Blood-Peripheral  - @ 14:07   No Growth Final  --  --          MEDICATIONS:    MEDICATIONS  (STANDING):  albuterol    90 MICROgram(s) HFA Inhaler 2 Puff(s) Inhalation every 6 hours  amLODIPine   Tablet 10 milliGRAM(s) Oral daily  aspirin enteric coated 81 milliGRAM(s) Oral daily  atorvastatin 40 milliGRAM(s) Oral at bedtime  budesonide 160 MICROgram(s)/formoterol 4.5 MICROgram(s) Inhaler 2 Puff(s) Inhalation two times a day  chlorhexidine 2% Cloths 1 Application(s) Topical daily  dextrose 5%. 1000 milliLiter(s) (50 mL/Hr) IV Continuous <Continuous>  dextrose 5%. 1000 milliLiter(s) (100 mL/Hr) IV Continuous <Continuous>  dextrose 50% Injectable 25 Gram(s) IV Push once  dextrose 50% Injectable 25 Gram(s) IV Push once  dextrose 50% Injectable 12.5 Gram(s) IV Push once  epoetin beverley-epbx (RETACRIT) Injectable 92938 Unit(s) IV Push <User Schedule>  folic acid 1 milliGRAM(s) Oral daily  furosemide    Tablet 40 milliGRAM(s) Oral daily  glucagon  Injectable 1 milliGRAM(s) IntraMuscular once  hydrALAZINE 25 milliGRAM(s) Oral three times a day  latanoprost 0.005% Ophthalmic Solution 1 Drop(s) Both EYES at bedtime  levothyroxine 25 MICROGram(s) Oral daily  lidocaine   4% Patch 2 Patch Transdermal daily  melatonin 3 milliGRAM(s) Oral at bedtime  metoprolol succinate ER 50 milliGRAM(s) Oral daily  pantoprazole    Tablet 40 milliGRAM(s) Oral before breakfast  sertraline 200 milliGRAM(s) Oral daily  sevelamer carbonate 800 milliGRAM(s) Oral three times a day with meals  sucralfate 1 Gram(s) Oral four times a day      MEDICATIONS  (PRN):  acetaminophen     Tablet .. 975 milliGRAM(s) Oral every 8 hours PRN Moderate Pain (4 - 6)  dextrose Oral Gel 15 Gram(s) Oral once PRN Blood Glucose LESS THAN 70 milliGRAM(s)/deciliter  ondansetron   Disintegrating Tablet 4 milliGRAM(s) Oral two times a day PRN Nausea and/or Vomiting  ondansetron Injectable 4 milliGRAM(s) IV Push every 6 hours PRN Nausea and/or Vomiting        REVIEW OF SYSTEMS:                           ALL ROS DONE [ X   ]      CONSTITUTIONAL:  LETHARGIC [   ], FEVER [   ], UNRESPONSIVE [   ]  CVS:  CP  [   ], SOB, [   ], PALPITATIONS [   ], DIZZYNESS [   ]  RS: COUGH [   ], SPUTUM [   ]  GI: ABDOMINAL PAIN [   ], NAUSEA [   ], VOMITINGS [   ], DIARRHEA [   ], CONSTIPATION [   ]  :  DYSURIA [   ], NOCTURIA [   ], INCREASED FREQUENCY [   ], DRIBLING [   ],  SKELETAL: PAINFUL JOINTS [   ], SWOLLEN JOINTS [   ], NECK ACHE [   ], LOW BACK ACHE [   ],  SKIN : ULCERS [   ], RASH [   ], ITCHING [   ]  CNS: HEAD ACHE [   ], DOUBLE VISION [   ], BLURRED VISION [   ], AMS / CONFUSION [   ], SEIZURES [   ], WEAKNESS [   ],TINGLING / NUMBNESS [   ]          PHYSICAL EXAMINATION:    GENERAL APPEARANCE: NO DISTRESS,    ANASARCA ++  HEENT:  NO PALLOR, NO  JVD,  NO   NODES, NECK SUPPLE  CVS: S1 +, S2 +,   RS: AEEB,  OCCASIONAL  RALES +,   CRACKLES+ AT LUNG BASES  ABD: SOFT, NT, NO, BS +  EXT: LEFT BKA  SKIN: WARM,   SKELETAL:  ROM ACCEPTABLE  CNS:  AAO X  2-3        RADIOLOGY :    RADIOLOGY AND READINGS REVIEWED          ASSESSMENT :     Hypervolemia    Hypertension    Adrenal insufficiency    CKD (chronic kidney disease)    Anemia    Glaucoma    Coronary artery disease    HLD (hyperlipidemia)    Peripheral vascular disease    Spinal stenosis of lumbosacral region    Hyperparathyroidism    Diabetes mellitus    Diabetic neuropathy    Contracture of hand    Osteoarthritis    Osteoporosis    Vision loss of left eye    ESRD on hemodialysis    Cataract    BPH (benign prostatic hyperplasia)    UTI (urinary tract infection)    Bladder mass    H/O hematuria    Osteoporosis    Vision loss of right eye    Depression    Chronic GERD    Osteomyelitis of vertebra    CHF (congestive heart failure)    Below knee amputation status, left    History of right cataract extraction    History of left cataract extraction    S/P arteriovenous (AV) fistula creation    H/O hematuria    H/O transurethral destruction of bladder lesion    History of excision of mass        PLAN:  HPI:  Patient is 62 year old male from [facility], walks with RW, with PMH of ESRD on HD (TTS), BKA- L w/prosthetic leg, DM, Left eye blindness, CHF, CAD, HTN, HLD, osteoporosis, bronchitis, current smoker, and anemia who presents with complaint of missed dialysis. Last HD . Pt states he often missed HD sessions.  patient states he missed this HD session due to mild pain in left leg, patient remains able to ambulate. Pt complains of right leg swelling and states he does not make any urine. Patient states he was having mild shortness of breath.  Pt denies any fever, chills, N/V/D, constipation, CP, numbness or tingling (2023 19:20)    # [8/9] MEETING HELD WITH PATIENT, BROTHER ARTEMIO @ [phone] AND SW TEAM. PATIENT W/ HISTORY OF ROUTINE NONCOMPLIANCE W/ LIFE-SUSTAINING TREATMENT [HD], EVALUATIONS AND INTERVENTIONS - COUNSELLED AT LENGTH TO REMAIN COMPLIANT. DISCUSSED THAT PROGNOSIS IS POOR/GRIM GIVEN UNDERLYING MEDICAL CONDITIONS AND GIVEN NONCOMPLIANCE. HE VERBALIZED UNDERSTANDING. REGARDING GOC - PATIENT WISHES FOR FULL CODE. PALLIATIVE CARE CONSULTED. PSYCHIATRY CONSULTED. PATIENT EXPRESSED THAT HE DOES GROW IMPATIENT DURING DIALYSIS SESSIONS AND HAS BEEN LEAVING SESSIONS SOONER THAN PRESCRIBED. IN DISCUSSION THAT RISKS OF DEFERRING COMPLETE HD - INCLUDE BUT ARE NOT LIMITED TO WORSENING CLINICAL CONDITION, ELECTROLYTE ABNORMALITIES, ARRHYTHMIA AND DEATH. HE VERBALIZED UNDERSTANDING. D/W PATIENT THAT REPEATEDLY REFUSING INTERVENTIONS AND ASSESSMENTS IS NOT IN LINE WITH HIS WISHES TO IMPROVE. PATIENT VERBALIZED UNDERSTANDING. DISCUSSED REGARDING CURRENT CLINICAL CONDITION, RECOMMENDED EVALUATIONS AND INTERVENTIONS. ALL QUESTIONS ANSWERED. TEAM HAS OFFERED PATIENT EMOTIONAL SUPPORT AND OFFERED MEDICATION FOR MOOD. OFFERED TO PRE-MEDICATE W/ ANXIOLYTIC PRIOR TO HD. PATIENT IS AGREEABLE.     DISCUSSED THAT PATIENT WILL NEED TO COMPLY WITH HD SESSIONS IN ORDER TO BE MEDICALLY OPTIMIZED FOR DISCHARGE AND FOR SAFE D/C PLANNING. TEAM DISCUSSED OPTIONS OF RETURNING TO [facility] W/ OUTPATIENT HD , IF CARE NEEDS CAN BE SAFELY MET VS. NURSING HOME/[facility] . PATIENT AND BROTHER VERBALIZED UNDERSTANDING.       # ACUTE ON CHRONIC HYPOXIC RESPIRATORY FAILURE S/T PULMONARY EDEMA - S/T INCOMPLETE HD SESSIONS ; ANASARCA  # HYPERKALEMIA  # HX OF COPD + S/P RECENT MULTIFOCAL PNEUMONIA ; R/O ASPIRATION PNEUMONIA  # PULMONARY HYPERTENSION  # UNCONTROLLED HTN  # ESRD ON HD TTS - W/ HX OF NONCOMPLIANCE    - TELEMETRY  - HYDRALAZINE, METOPROLOL AND NORVASC ; PRN IV ANTIHYPERTENSIVE  - LOKELMA  - SUPPLEMENTAL O2  - PLANNED FOR HD  - NEPHROLOGY CONSULT  - PULMONOLOGY CONSULT  - ID CONSULT    - CONTINUES TO REFUSE OR PRE-MATURELY DISCONTINUE SESSIONS OF HD       - [] - OVERNIGHT RAPID RESPONSE S/T HYPOXIA - SELF-LIMITED - PATIENT IMPROVED S/P O2 SUPPLEMENT  - S/P CEFEPIME + FLAGYLL, BCX - NGTD      # RECURRENT INTRACTABLE NAUSEA/VOMITING, ? COFFEE GROUND EMESIS  # GASTROPARESIS  # HISTORY OF ESOPHAGITIS AND DUODENITIS [2021], HX OF GASTROPARESIS W/ EVIDENCE OF THICKENED ESOPHAGUS ON PREVIOUS CT SCAN   # PANCREAS W/ DOUBLE DUCT SIGN    - MONITORING HGB, PLACED ON PPI BID , CARAFATE QID  - PRN ANTIEMETICS  ; S/P DOSE OF REGLAN [WITH MINIMAL IMPROVEMENT]  - MONITORING FOR SYMPTOMS  - WHILE ON DIET PATIENT REPEATEDLY COUNSELLED TO CHEW FOOD CAUTIOUSLY AND CONSUME SMALL BITES W/ REGULAR CLEARING WITH WATER BETWEEN BITES    - ADVANCE DIET AS TOLERATED  - NOTED CT A/P    - S/P RECENT PRBC TRANSFUSION     - GI CONSULT  - SURGERY CONSULT    # LESS LIKELY CHOLECYSTITIS  - S/P ABX  - NOTED CT A/P  - HIDA SCAN - NEGATIVE  - SURGERY CONSULT    # ELEVATED TROPONINS - ? DEMAND ISCHEMIA    - S/P TELEMETRY  - TREND TROPONINS  - ECHO - TRACE MR, MODERATELY INCREASED LV WALL THICKNESS, NORMAL LV SYSTOLIC FUNCTION, SEVERE PULMONARY HTN  - CARDIOLOGY CONSULT    # EPISODE OF HYPOGLYCEMIA - IMPROVED  # GASTROPARESIS  # UNDERLYING DM  - SSI + FS  - COUNSELLED TO COMPLY WITH HD TO REDUCE UREMIA    - REPEATEDLY COUNSELLED TO COMPLY WITH FINGERSTICKS      # DEPRESSION  - PLACED ON ZOLOFT  - PSYCHIATRY CONSULT    # PATIENT UNDERGOING OUTPATIENT WORKUP FOR CENTRAL VENOUS STENOSIS THROUGH NEPHROLOGY TEAM  - ? s/p STENT PLACEMENT    # ANEMIA OF CKD  # HX OF PANCYTOPENIA   - TREND HGB, TRANSFUSION THRESHOLD HGB < 7; PATIENT STILL INTERMITTENTLY REFUSING BLOODWORK, COUNSELLED TO COMPLY  - TYPE AND SCREEN  - ON EPO  - DENIES RECENT HEMATEMESIS, MELENA, HEMATOCHEZIA    - S/P RECENT PRBC TRANSFUSION  - S/P PRBC TRANSFUSION     - PPI BID, CARAFATE QID    # SEVERE PROTEIN CALORIE MALNUTRITION, FAILURE TO THRIVE   -  TEMPORAL WASTING, LOSS OF MUSCLE MASS FROM SHOULDER AND HIP GIRDLE  - NUTRITIONAL SUPPLEMENT    # HLD  # PARTIALLY BLIND  # S/P LEFT BKA  # HX OF PVD  # LS SPINAL STENOSIS  # GI AND DVT PPX    DR. COLIN MAC

## 2023-08-13 NOTE — PROGRESS NOTE ADULT - SUBJECTIVE AND OBJECTIVE BOX
DATE OF SERVICE: 08-13-23    no events overnight,        acetaminophen     Tablet .. 975 milliGRAM(s) Oral every 8 hours PRN  albuterol    90 MICROgram(s) HFA Inhaler 2 Puff(s) Inhalation every 6 hours  amLODIPine   Tablet 10 milliGRAM(s) Oral daily  aspirin enteric coated 81 milliGRAM(s) Oral daily  atorvastatin 40 milliGRAM(s) Oral at bedtime  budesonide 160 MICROgram(s)/formoterol 4.5 MICROgram(s) Inhaler 2 Puff(s) Inhalation two times a day  chlorhexidine 2% Cloths 1 Application(s) Topical daily  dextrose 5%. 1000 milliLiter(s) IV Continuous <Continuous>  dextrose 5%. 1000 milliLiter(s) IV Continuous <Continuous>  dextrose 50% Injectable 25 Gram(s) IV Push once  dextrose 50% Injectable 12.5 Gram(s) IV Push once  dextrose 50% Injectable 25 Gram(s) IV Push once  dextrose Oral Gel 15 Gram(s) Oral once PRN  epoetin beverley-epbx (RETACRIT) Injectable 03697 Unit(s) IV Push <User Schedule>  folic acid 1 milliGRAM(s) Oral daily  furosemide    Tablet 40 milliGRAM(s) Oral daily  glucagon  Injectable 1 milliGRAM(s) IntraMuscular once  hydrALAZINE 25 milliGRAM(s) Oral three times a day  latanoprost 0.005% Ophthalmic Solution 1 Drop(s) Both EYES at bedtime  levothyroxine 25 MICROGram(s) Oral daily  lidocaine   4% Patch 2 Patch Transdermal daily  melatonin 3 milliGRAM(s) Oral at bedtime  metoprolol succinate ER 50 milliGRAM(s) Oral daily  ondansetron   Disintegrating Tablet 4 milliGRAM(s) Oral two times a day PRN  ondansetron Injectable 4 milliGRAM(s) IV Push every 6 hours PRN  pantoprazole    Tablet 40 milliGRAM(s) Oral before breakfast  sertraline 200 milliGRAM(s) Oral daily  sevelamer carbonate 800 milliGRAM(s) Oral three times a day with meals  sucralfate 1 Gram(s) Oral four times a day          Hemoglobin: 8.3 g/dL (08-11 @ 06:23)  Hemoglobin: 8.0 g/dL (08-10 @ 06:21)  Hemoglobin: 7.6 g/dL (08-09 @ 05:50)            Creatinine Trend: 12.90<--, 11.70<--, 17.50<--, 17.40<--, 16.90<--, 15.40<--    COAGS:           T(C): 37 (08-13-23 @ 05:26), Max: 37.3 (08-12-23 @ 20:47)  HR: 86 (08-13-23 @ 05:26) (86 - 97)  BP: 149/72 (08-13-23 @ 05:26) (149/72 - 167/74)  RR: 18 (08-13-23 @ 05:26) (18 - 18)  SpO2: 93% (08-13-23 @ 05:26) (93% - 100%)  Wt(kg): --    I&O's Summary      HEENT:  (-)icterus (-)pallor  CV: N S1 S2 1/6 DIANA (+)2 Pulses B/l  Resp:  Clear to ausculatation B/L, normal effort  GI: (+) BS Soft, NT, ND  Lymph:  (-)Edema, (-)obvious lymphadenopathy  Skin: Warm to touch, Normal turgor  Psych: Appropriate mood and affect         ASSESSMENT/PLAN: 	62y Male  Mateus Murray, walks with RW, with PMH of ESRD on HD (TTS), BKA- L w/prosthetic leg, DM, Left eye blindness, CHF,, HTN, HLD, EF 45-50%, normal perfusion 4/23 osteoporosis, bronchitis, current smoker, and anemia who presents with complaint of missed dialysis.     # CHF  - Due to missed HD  - cont diuretics per renal   - HD per renal    # Positive trop  - nothing to suggest ACS.  His trop has been higher in the past and demonstrated normal perfusion on Stress 4/23  - ECHO noted, normal LV fx severe pulm HTN, cont to keep net negative     # Noncompliance  - Behavioral health f/u  - refusing meds and blood tests as well as HD at times     # Smoking  - Cessation stressed    # HTN  - Suspect BP will improve once euvolemic

## 2023-08-13 NOTE — GOALS OF CARE CONVERSATION - ADVANCED CARE PLANNING - CONVERSATION DETAILS
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 Patient states he was having shortness of breath. Patient admitted to telemetry for Acute Hyperkalemia 2/2 missed dialysis found to have pulmonary edema and BNP [id number]. Cardiology and nephro following. Hospital course complicated by hypoglycemia, and rapid response for AHRF .  Pt with nausea/vomiting, refused US guided IV placement yesterday. Refusing labs today.  Discussed risk/benefit of IV access, labs to evaluate for leukocytosis/electrolyte imbalance  in setting of N/V/diarrhea. GOC discussion . Pt endorses : "my heart will not stop..." Pt now agreeable to USG  IV placement and labs.

## 2023-08-13 NOTE — CHART NOTE - NSCHARTNOTEFT_GEN_A_CORE
EVENT:Informed by RN of pt vomiting, and liquid yellow BM this morning. s/p diarrhea x 2 overnight.     SUBJECTIVE:Pt seen and examined this morning. Pt AAOx3, sitting up on BSC, vomited whitish emesis. Pt endorses "medications are making me sick." Pt denies dizziness, SOB, chest discomfort, abdominal discomfort.       OBJECTIVE:  Vital Signs Last 24 Hrs  T(C): 37 (13 Aug 2023 05:26), Max: 37.3 (12 Aug 2023 20:47)  T(F): 98.6 (13 Aug 2023 05:26), Max: 99.1 (12 Aug 2023 20:47)  HR: 86 (13 Aug 2023 05:26) (86 - 97)  BP: 149/72 (13 Aug 2023 05:26) (149/72 - 167/74)  BP(mean): --  RR: 18 (13 Aug 2023 05:26) (18 - 18)  SpO2: 93% (13 Aug 2023 05:26) (93% - 100%)    Parameters below as of 13 Aug 2023 05:26  Patient On (Oxygen Delivery Method): nasal cannula  O2 Flow (L/min): 2  ------  Gen: vomiting  HEENT: PERRL, anicteric, no oral mucositis  Resp: Clear breath sounds on auscultation. No rales, no wheezing  CVS: S1S2 present, regular  GI: BS(+), Soft, ND, NT  Extremities : BLE No edema or calf tenderness. PPP  Neuro: Awake/alert.  no new neuro deficits  Skin: warm,dry,      LABS:              ASSESSMENT:  HPI:  62 year old male from [facility], walks with RW, with PMH of ESRD on HD (TTS), BKA- L w/prosthetic leg, DM, Left eye blindness, CHF, CAD, HTN, HLD, osteoporosis, bronchitis, current smoker, and anemia who presents with complaint of missed dialysis and does not make any urine. Patient states he was having shortness of breath. Patient admitted to telemetry for Acute Hyperkalemia 2/2 missed dialysis found to have pulmonary edema and BNP [id number]. Cardiology and nephro following. Hospital course complicated by hypoglycemia, and rapid response for AHRF .  Now with recurrent N/V, diarrhea. Diarrhea unlikely Cdiff as pt is afebrile,  no leukocytosis, poor solid intake. BM without foul odor.   -Reiterated to pt  benefit of IV    PLAN:   -Continue Zofran prn N/V.   -Change meds to IV  Obtain CBC/BMP today.     Yumiko Lomas NP Medicine

## 2023-08-14 NOTE — PROGRESS NOTE ADULT - PROBLEM SELECTOR PLAN 9
H/o Diabetes on Lantus   - A1c 6.1  - noted with episodes of hypoglycemia on 8/12  - likely 2/2 poor PO intake and nausea  - lantus d/c on 8/12  - monitor AC HS

## 2023-08-14 NOTE — PROGRESS NOTE ADULT - SUBJECTIVE AND OBJECTIVE BOX
Patient is a 62y old  Male who presents with a chief complaint of Missed dialysis (13 Aug 2023 12:31)    PATIENT IS SEEN AND EXAMINED IN MEDICAL FLOOR.  MARIIT [    ]    JEREMY [   ]      GT [   ]    ALLERGIES:  No Known Drug Allergies  fish (Rash)  liver (Anaphylaxis)      Daily     Daily Weight in k.2 (14 Aug 2023 04:45)    VITALS:    Vital Signs Last 24 Hrs  T(C): 36.6 (14 Aug 2023 04:45), Max: 37.2 (13 Aug 2023 20:24)  T(F): 97.8 (14 Aug 2023 04:45), Max: 99 (13 Aug 2023 20:24)  HR: 96 (14 Aug 2023 04:45) (80 - 96)  BP: 148/64 (14 Aug 2023 04:45) (141/70 - 148/64)  BP(mean): --  RR: 18 (14 Aug 2023 04:45) (17 - 18)  SpO2: 100% (14 Aug 2023 04:45) (100% - 100%)    Parameters below as of 14 Aug 2023 04:45  Patient On (Oxygen Delivery Method): nasal cannula  O2 Flow (L/min): 2      LABS:              CAPILLARY BLOOD GLUCOSE      POCT Blood Glucose.: 108 mg/dL (14 Aug 2023 08:09)  POCT Blood Glucose.: 110 mg/dL (13 Aug 2023 21:05)  POCT Blood Glucose.: 149 mg/dL (13 Aug 2023 16:46)  POCT Blood Glucose.: 118 mg/dL (13 Aug 2023 11:25)          Creatinine Trend: 12.90<--, 11.70<--, 17.50<--, 17.40<--, 16.90<--, 15.40<--  I&O's Summary          .Blood Blood-Peripheral  - @ 14:07   No Growth Final  --  --          MEDICATIONS:    MEDICATIONS  (STANDING):  albuterol    90 MICROgram(s) HFA Inhaler 2 Puff(s) Inhalation every 6 hours  amLODIPine   Tablet 10 milliGRAM(s) Oral daily  aspirin enteric coated 81 milliGRAM(s) Oral daily  atorvastatin 40 milliGRAM(s) Oral at bedtime  budesonide 160 MICROgram(s)/formoterol 4.5 MICROgram(s) Inhaler 2 Puff(s) Inhalation two times a day  chlorhexidine 2% Cloths 1 Application(s) Topical daily  dextrose 5%. 1000 milliLiter(s) (50 mL/Hr) IV Continuous <Continuous>  dextrose 5%. 1000 milliLiter(s) (100 mL/Hr) IV Continuous <Continuous>  dextrose 50% Injectable 25 Gram(s) IV Push once  dextrose 50% Injectable 25 Gram(s) IV Push once  dextrose 50% Injectable 12.5 Gram(s) IV Push once  epoetin beverley-epbx (RETACRIT) Injectable 90030 Unit(s) IV Push <User Schedule>  folic acid 1 milliGRAM(s) Oral daily  furosemide    Tablet 40 milliGRAM(s) Oral daily  glucagon  Injectable 1 milliGRAM(s) IntraMuscular once  hydrALAZINE 25 milliGRAM(s) Oral three times a day  latanoprost 0.005% Ophthalmic Solution 1 Drop(s) Both EYES at bedtime  levothyroxine 25 MICROGram(s) Oral daily  lidocaine   4% Patch 2 Patch Transdermal daily  melatonin 3 milliGRAM(s) Oral at bedtime  metoprolol succinate ER 50 milliGRAM(s) Oral daily  pantoprazole    Tablet 40 milliGRAM(s) Oral before breakfast  sertraline 200 milliGRAM(s) Oral daily  sevelamer carbonate 800 milliGRAM(s) Oral three times a day with meals  sucralfate 1 Gram(s) Oral four times a day      MEDICATIONS  (PRN):  acetaminophen     Tablet .. 975 milliGRAM(s) Oral every 8 hours PRN Moderate Pain (4 - 6)  dextrose Oral Gel 15 Gram(s) Oral once PRN Blood Glucose LESS THAN 70 milliGRAM(s)/deciliter  ondansetron   Disintegrating Tablet 4 milliGRAM(s) Oral two times a day PRN Nausea and/or Vomiting  ondansetron Injectable 4 milliGRAM(s) IV Push every 6 hours PRN Nausea and/or Vomiting      REVIEW OF SYSTEMS:                           ALL ROS DONE [ X   ]    CONSTITUTIONAL:  LETHARGIC [   ], FEVER [   ], UNRESPONSIVE [   ]  CVS:  CP  [   ], SOB, [   ], PALPITATIONS [   ], DIZZYNESS [   ]  RS: COUGH [   ], SPUTUM [   ]  GI: ABDOMINAL PAIN [   ], NAUSEA [   ], VOMITINGS [   ], DIARRHEA [   ], CONSTIPATION [   ]  :  DYSURIA [   ], NOCTURIA [   ], INCREASED FREQUENCY [   ], DRIBLING [   ],  SKELETAL: PAINFUL JOINTS [   ], SWOLLEN JOINTS [   ], NECK ACHE [   ], LOW BACK ACHE [   ],  SKIN : ULCERS [   ], RASH [   ], ITCHING [   ]  CNS: HEAD ACHE [   ], DOUBLE VISION [   ], BLURRED VISION [   ], AMS / CONFUSION [   ], SEIZURES [   ], WEAKNESS [   ],TINGLING / NUMBNESS [   ]      PHYSICAL EXAMINATION:    GENERAL APPEARANCE: NO DISTRESS,    ANASARCA ++  HEENT:  NO PALLOR, NO  JVD,  NO   NODES, NECK SUPPLE  CVS: S1 +, S2 +,   RS: AEEB,  OCCASIONAL  RALES +,   CRACKLES+ AT LUNG BASES  ABD: SOFT, NT, NO, BS +  EXT: LEFT BKA  SKIN: WARM,   SKELETAL:  ROM ACCEPTABLE  CNS:  AAO X  2-3        RADIOLOGY :    RADIOLOGY AND READINGS REVIEWED          ASSESSMENT :     Hypervolemia    Hypertension    Adrenal insufficiency    CKD (chronic kidney disease)    Anemia    Glaucoma    Coronary artery disease    HLD (hyperlipidemia)    Peripheral vascular disease    Spinal stenosis of lumbosacral region    Hyperparathyroidism    Diabetes mellitus    Diabetic neuropathy    Contracture of hand    Osteoarthritis    Osteoporosis    Vision loss of left eye    ESRD on hemodialysis    Cataract    BPH (benign prostatic hyperplasia)    UTI (urinary tract infection)    Bladder mass    H/O hematuria    Osteoporosis    Vision loss of right eye    Depression    Chronic GERD    Osteomyelitis of vertebra    CHF (congestive heart failure)    Below knee amputation status, left    History of right cataract extraction    History of left cataract extraction    S/P arteriovenous (AV) fistula creation    H/O hematuria    H/O transurethral destruction of bladder lesion    History of excision of mass        PLAN:  HPI:  Patient is 62 year old male from [facility], walks with RW, with PMH of ESRD on HD (TTS), BKA- L w/prosthetic leg, DM, Left eye blindness, CHF, CAD, HTN, HLD, osteoporosis, bronchitis, current smoker, and anemia who presents with complaint of missed dialysis. Last HD . Pt states he often missed HD sessions.  patient states he missed this HD session due to mild pain in left leg, patient remains able to ambulate. Pt complains of right leg swelling and states he does not make any urine. Patient states he was having mild shortness of breath.  Pt denies any fever, chills, N/V/D, constipation, CP, numbness or tingling (2023 19:20)    # [8/9] MEETING HELD WITH PATIENT, BROTHER ARTEMIO @ [phone] AND SW TEAM. PATIENT W/ HISTORY OF ROUTINE NONCOMPLIANCE W/ LIFE-SUSTAINING TREATMENT [HD], EVALUATIONS AND INTERVENTIONS - COUNSELLED AT LENGTH TO REMAIN COMPLIANT. DISCUSSED THAT PROGNOSIS IS POOR/GRIM GIVEN UNDERLYING MEDICAL CONDITIONS AND GIVEN NONCOMPLIANCE. HE VERBALIZED UNDERSTANDING. REGARDING GOC - PATIENT WISHES FOR FULL CODE. PALLIATIVE CARE CONSULTED. PSYCHIATRY CONSULTED. PATIENT EXPRESSED THAT HE DOES GROW IMPATIENT DURING DIALYSIS SESSIONS AND HAS BEEN LEAVING SESSIONS SOONER THAN PRESCRIBED. IN DISCUSSION THAT RISKS OF DEFERRING COMPLETE HD - INCLUDE BUT ARE NOT LIMITED TO WORSENING CLINICAL CONDITION, ELECTROLYTE ABNORMALITIES, ARRHYTHMIA AND DEATH. HE VERBALIZED UNDERSTANDING. D/W PATIENT THAT REPEATEDLY REFUSING INTERVENTIONS AND ASSESSMENTS IS NOT IN LINE WITH HIS WISHES TO IMPROVE. PATIENT VERBALIZED UNDERSTANDING. DISCUSSED REGARDING CURRENT CLINICAL CONDITION, RECOMMENDED EVALUATIONS AND INTERVENTIONS. ALL QUESTIONS ANSWERED. TEAM HAS OFFERED PATIENT EMOTIONAL SUPPORT AND OFFERED MEDICATION FOR MOOD. OFFERED TO PRE-MEDICATE W/ ANXIOLYTIC PRIOR TO HD. PATIENT IS AGREEABLE.     DISCUSSED THAT PATIENT WILL NEED TO COMPLY WITH HD SESSIONS IN ORDER TO BE MEDICALLY OPTIMIZED FOR DISCHARGE AND FOR SAFE D/C PLANNING. TEAM DISCUSSED OPTIONS OF RETURNING TO [facility] W/ OUTPATIENT HD , IF CARE NEEDS CAN BE SAFELY MET VS. NURSING HOME/[facility] . PATIENT AND BROTHER VERBALIZED UNDERSTANDING.       # ACUTE ON CHRONIC HYPOXIC RESPIRATORY FAILURE S/T PULMONARY EDEMA - S/T INCOMPLETE HD SESSIONS ; ANASARCA  # HYPERKALEMIA  # HX OF COPD + S/P RECENT MULTIFOCAL PNEUMONIA ; R/O ASPIRATION PNEUMONIA  # PULMONARY HYPERTENSION  # UNCONTROLLED HTN  # ESRD ON HD TTS - W/ HX OF NONCOMPLIANCE    - TELEMETRY  - HYDRALAZINE, METOPROLOL AND NORVASC ; PRN IV ANTIHYPERTENSIVE  - LOKELMA  - SUPPLEMENTAL O2  - PLANNED FOR HD  - NEPHROLOGY CONSULT  - PULMONOLOGY CONSULT  - ID CONSULT    - CONTINUES TO REFUSE OR PRE-MATURELY DISCONTINUE SESSIONS OF HD       - [] - OVERNIGHT RAPID RESPONSE S/T HYPOXIA - SELF-LIMITED - PATIENT IMPROVED S/P O2 SUPPLEMENT  - S/P CEFEPIME + FLAGYLL, BCX - NGTD      # RECURRENT INTRACTABLE NAUSEA/VOMITING, ? COFFEE GROUND EMESIS  # GASTROPARESIS  # HISTORY OF ESOPHAGITIS AND DUODENITIS [2021], HX OF GASTROPARESIS W/ EVIDENCE OF THICKENED ESOPHAGUS ON PREVIOUS CT SCAN   # PANCREAS W/ DOUBLE DUCT SIGN    - MONITORING HGB, PLACED ON PPI BID , CARAFATE QID  - PRN ANTIEMETICS  ; S/P DOSE OF REGLAN [WITH MINIMAL IMPROVEMENT]  - MONITORING FOR SYMPTOMS  - WHILE ON DIET PATIENT REPEATEDLY COUNSELLED TO CHEW FOOD CAUTIOUSLY AND CONSUME SMALL BITES W/ REGULAR CLEARING WITH WATER BETWEEN BITES    - ADVANCE DIET AS TOLERATED  - NOTED CT A/P    - S/P RECENT PRBC TRANSFUSION     - GI CONSULT  - SURGERY CONSULT    # LESS LIKELY CHOLECYSTITIS  - S/P ABX  - NOTED CT A/P  - HIDA SCAN - NEGATIVE  - SURGERY CONSULT    # ELEVATED TROPONINS - ? DEMAND ISCHEMIA    - S/P TELEMETRY  - TREND TROPONINS  - ECHO - TRACE MR, MODERATELY INCREASED LV WALL THICKNESS, NORMAL LV SYSTOLIC FUNCTION, SEVERE PULMONARY HTN  - CARDIOLOGY CONSULT    # EPISODE OF HYPOGLYCEMIA - IMPROVED  # GASTROPARESIS  # UNDERLYING DM  - SSI + FS  - COUNSELLED TO COMPLY WITH HD TO REDUCE UREMIA    - REPEATEDLY COUNSELLED TO COMPLY WITH FINGERSTICKS      # DEPRESSION  - PLACED ON ZOLOFT  - PSYCHIATRY CONSULT    # PATIENT UNDERGOING OUTPATIENT WORKUP FOR CENTRAL VENOUS STENOSIS THROUGH NEPHROLOGY TEAM  - ? s/p STENT PLACEMENT    # ANEMIA OF CKD  # HX OF PANCYTOPENIA   - TREND HGB, TRANSFUSION THRESHOLD HGB < 7; PATIENT STILL INTERMITTENTLY REFUSING BLOODWORK, COUNSELLED TO COMPLY  - TYPE AND SCREEN  - ON EPO  - DENIES RECENT HEMATEMESIS, MELENA, HEMATOCHEZIA    - S/P RECENT PRBC TRANSFUSION  - S/P PRBC TRANSFUSION     - PPI BID, CARAFATE QID    # SEVERE PROTEIN CALORIE MALNUTRITION, FAILURE TO THRIVE   -  TEMPORAL WASTING, LOSS OF MUSCLE MASS FROM SHOULDER AND HIP GIRDLE  - NUTRITIONAL SUPPLEMENT    # HLD  # PARTIALLY BLIND  # S/P LEFT BKA  # HX OF PVD  # LS SPINAL STENOSIS  # GI AND DVT PPX     Patient is a 62y old  Male who presents with a chief complaint of Missed dialysis (13 Aug 2023 12:31)    PATIENT IS SEEN AND EXAMINED IN MEDICAL FLOOR.    ALLERGIES:  No Known Drug Allergies  fish (Rash)  liver (Anaphylaxis)      Daily     Daily Weight in k.2 (14 Aug 2023 04:45)    VITALS:    Vital Signs Last 24 Hrs  T(C): 36.6 (14 Aug 2023 04:45), Max: 37.2 (13 Aug 2023 20:24)  T(F): 97.8 (14 Aug 2023 04:45), Max: 99 (13 Aug 2023 20:24)  HR: 96 (14 Aug 2023 04:45) (80 - 96)  BP: 148/64 (14 Aug 2023 04:45) (141/70 - 148/64)  BP(mean): --  RR: 18 (14 Aug 2023 04:45) (17 - 18)  SpO2: 100% (14 Aug 2023 04:45) (100% - 100%)    Parameters below as of 14 Aug 2023 04:45  Patient On (Oxygen Delivery Method): nasal cannula  O2 Flow (L/min): 2      LABS:              CAPILLARY BLOOD GLUCOSE      POCT Blood Glucose.: 108 mg/dL (14 Aug 2023 08:09)  POCT Blood Glucose.: 110 mg/dL (13 Aug 2023 21:05)  POCT Blood Glucose.: 149 mg/dL (13 Aug 2023 16:46)  POCT Blood Glucose.: 118 mg/dL (13 Aug 2023 11:25)          Creatinine Trend: 12.90<--, 11.70<--, 17.50<--, 17.40<--, 16.90<--, 15.40<--  I&O's Summary          .Blood Blood-Peripheral  - @ 14:07   No Growth Final  --  --          MEDICATIONS:    MEDICATIONS  (STANDING):  albuterol    90 MICROgram(s) HFA Inhaler 2 Puff(s) Inhalation every 6 hours  amLODIPine   Tablet 10 milliGRAM(s) Oral daily  aspirin enteric coated 81 milliGRAM(s) Oral daily  atorvastatin 40 milliGRAM(s) Oral at bedtime  budesonide 160 MICROgram(s)/formoterol 4.5 MICROgram(s) Inhaler 2 Puff(s) Inhalation two times a day  chlorhexidine 2% Cloths 1 Application(s) Topical daily  dextrose 5%. 1000 milliLiter(s) (50 mL/Hr) IV Continuous <Continuous>  dextrose 5%. 1000 milliLiter(s) (100 mL/Hr) IV Continuous <Continuous>  dextrose 50% Injectable 25 Gram(s) IV Push once  dextrose 50% Injectable 25 Gram(s) IV Push once  dextrose 50% Injectable 12.5 Gram(s) IV Push once  epoetin beverley-epbx (RETACRIT) Injectable 38643 Unit(s) IV Push <User Schedule>  folic acid 1 milliGRAM(s) Oral daily  furosemide    Tablet 40 milliGRAM(s) Oral daily  glucagon  Injectable 1 milliGRAM(s) IntraMuscular once  hydrALAZINE 25 milliGRAM(s) Oral three times a day  latanoprost 0.005% Ophthalmic Solution 1 Drop(s) Both EYES at bedtime  levothyroxine 25 MICROGram(s) Oral daily  lidocaine   4% Patch 2 Patch Transdermal daily  melatonin 3 milliGRAM(s) Oral at bedtime  metoprolol succinate ER 50 milliGRAM(s) Oral daily  pantoprazole    Tablet 40 milliGRAM(s) Oral before breakfast  sertraline 200 milliGRAM(s) Oral daily  sevelamer carbonate 800 milliGRAM(s) Oral three times a day with meals  sucralfate 1 Gram(s) Oral four times a day      MEDICATIONS  (PRN):  acetaminophen     Tablet .. 975 milliGRAM(s) Oral every 8 hours PRN Moderate Pain (4 - 6)  dextrose Oral Gel 15 Gram(s) Oral once PRN Blood Glucose LESS THAN 70 milliGRAM(s)/deciliter  ondansetron   Disintegrating Tablet 4 milliGRAM(s) Oral two times a day PRN Nausea and/or Vomiting  ondansetron Injectable 4 milliGRAM(s) IV Push every 6 hours PRN Nausea and/or Vomiting      REVIEW OF SYSTEMS:                           ALL ROS DONE [ X   ]    CONSTITUTIONAL:  LETHARGIC [   ], FEVER [   ], UNRESPONSIVE [   ]  CVS:  CP  [   ], SOB, [   ], PALPITATIONS [   ], DIZZYNESS [   ]  RS: COUGH [   ], SPUTUM [   ]  GI: ABDOMINAL PAIN [   ], NAUSEA [   ], VOMITINGS [   ], DIARRHEA [   ], CONSTIPATION [   ]  :  DYSURIA [   ], NOCTURIA [   ], INCREASED FREQUENCY [   ], DRIBLING [   ],  SKELETAL: PAINFUL JOINTS [   ], SWOLLEN JOINTS [   ], NECK ACHE [   ], LOW BACK ACHE [   ],  SKIN : ULCERS [   ], RASH [   ], ITCHING [   ]  CNS: HEAD ACHE [   ], DOUBLE VISION [   ], BLURRED VISION [   ], AMS / CONFUSION [   ], SEIZURES [   ], WEAKNESS [   ],TINGLING / NUMBNESS [   ]      PHYSICAL EXAMINATION:    GENERAL APPEARANCE: NO DISTRESS,    ANASARCA ++  HEENT:  NO PALLOR, NO  JVD,  NO   NODES, NECK SUPPLE  CVS: S1 +, S2 +,   RS: AEEB,  OCCASIONAL  RALES +,   CRACKLES+ AT LUNG BASES  ABD: SOFT, NT, NO, BS +  EXT: LEFT BKA  SKIN: WARM,   SKELETAL:  ROM ACCEPTABLE  CNS:  AAO X  2-3        RADIOLOGY :    RADIOLOGY AND READINGS REVIEWED          ASSESSMENT :     Hypervolemia    Hypertension    Adrenal insufficiency    CKD (chronic kidney disease)    Anemia    Glaucoma    Coronary artery disease    HLD (hyperlipidemia)    Peripheral vascular disease    Spinal stenosis of lumbosacral region    Hyperparathyroidism    Diabetes mellitus    Diabetic neuropathy    Contracture of hand    Osteoarthritis    Osteoporosis    Vision loss of left eye    ESRD on hemodialysis    Cataract    BPH (benign prostatic hyperplasia)    UTI (urinary tract infection)    Bladder mass    H/O hematuria    Osteoporosis    Vision loss of right eye    Depression    Chronic GERD    Osteomyelitis of vertebra    CHF (congestive heart failure)    Below knee amputation status, left    History of right cataract extraction    History of left cataract extraction    S/P arteriovenous (AV) fistula creation    H/O hematuria    H/O transurethral destruction of bladder lesion    History of excision of mass        PLAN:  HPI:  Patient is 62 year old male from [facility], walks with RW, with PMH of ESRD on HD (TTS), BKA- L w/prosthetic leg, DM, Left eye blindness, CHF, CAD, HTN, HLD, osteoporosis, bronchitis, current smoker, and anemia who presents with complaint of missed dialysis. Last HD . Pt states he often missed HD sessions.  patient states he missed this HD session due to mild pain in left leg, patient remains able to ambulate. Pt complains of right leg swelling and states he does not make any urine. Patient states he was having mild shortness of breath.  Pt denies any fever, chills, N/V/D, constipation, CP, numbness or tingling (2023 19:20)    # [8/9] MEETING HELD WITH PATIENT, BROTHER ARTEMIO @ [phone] AND SW TEAM. PATIENT W/ HISTORY OF ROUTINE NONCOMPLIANCE W/ LIFE-SUSTAINING TREATMENT [HD], EVALUATIONS AND INTERVENTIONS - COUNSELLED AT LENGTH TO REMAIN COMPLIANT. DISCUSSED THAT PROGNOSIS IS POOR/GRIM GIVEN UNDERLYING MEDICAL CONDITIONS AND GIVEN NONCOMPLIANCE. HE VERBALIZED UNDERSTANDING. REGARDING GOC - PATIENT WISHES FOR FULL CODE. PALLIATIVE CARE CONSULTED. PSYCHIATRY CONSULTED. PATIENT EXPRESSED THAT HE DOES GROW IMPATIENT DURING DIALYSIS SESSIONS AND HAS BEEN LEAVING SESSIONS SOONER THAN PRESCRIBED. IN DISCUSSION THAT RISKS OF DEFERRING COMPLETE HD - INCLUDE BUT ARE NOT LIMITED TO WORSENING CLINICAL CONDITION, ELECTROLYTE ABNORMALITIES, ARRHYTHMIA AND DEATH. HE VERBALIZED UNDERSTANDING. D/W PATIENT THAT REPEATEDLY REFUSING INTERVENTIONS AND ASSESSMENTS IS NOT IN LINE WITH HIS WISHES TO IMPROVE. PATIENT VERBALIZED UNDERSTANDING. DISCUSSED REGARDING CURRENT CLINICAL CONDITION, RECOMMENDED EVALUATIONS AND INTERVENTIONS. ALL QUESTIONS ANSWERED. TEAM HAS OFFERED PATIENT EMOTIONAL SUPPORT AND OFFERED MEDICATION FOR MOOD. OFFERED TO PRE-MEDICATE W/ ANXIOLYTIC PRIOR TO HD. PATIENT IS AGREEABLE.     DISCUSSED THAT PATIENT WILL NEED TO COMPLY WITH HD SESSIONS IN ORDER TO BE MEDICALLY OPTIMIZED FOR DISCHARGE AND FOR SAFE D/C PLANNING. TEAM DISCUSSED OPTIONS OF RETURNING TO [facility] W/ OUTPATIENT HD , IF CARE NEEDS CAN BE SAFELY MET VS. NURSING HOME/[facility] . PATIENT AND BROTHER VERBALIZED UNDERSTANDING.       # ACUTE ON CHRONIC HYPOXIC RESPIRATORY FAILURE S/T PULMONARY EDEMA - S/T INCOMPLETE HD SESSIONS ; ANASARCA  # HYPERKALEMIA  # HX OF COPD + S/P RECENT MULTIFOCAL PNEUMONIA ; R/O ASPIRATION PNEUMONIA  # PULMONARY HYPERTENSION  # UNCONTROLLED HTN  # ESRD ON HD TTS - W/ HX OF NONCOMPLIANCE    - TELEMETRY  - HYDRALAZINE, METOPROLOL AND NORVASC ; PRN IV ANTIHYPERTENSIVE  - LOKELMA  - SUPPLEMENTAL O2  - PLANNED FOR HD  - NEPHROLOGY CONSULT  - PULMONOLOGY CONSULT  - ID CONSULT    - CONTINUES TO REFUSE OR PRE-MATURELY DISCONTINUE SESSIONS OF HD       - [] - OVERNIGHT RAPID RESPONSE S/T HYPOXIA - SELF-LIMITED - PATIENT IMPROVED S/P O2 SUPPLEMENT  - S/P CEFEPIME + FLAGYLL, BCX - NGTD      # RECURRENT INTRACTABLE NAUSEA/VOMITING, ? COFFEE GROUND EMESIS  # GASTROPARESIS  # HISTORY OF ESOPHAGITIS AND DUODENITIS [2021], HX OF GASTROPARESIS W/ EVIDENCE OF THICKENED ESOPHAGUS ON PREVIOUS CT SCAN   # PANCREAS W/ DOUBLE DUCT SIGN    - MONITORING HGB, PLACED ON PPI BID , CARAFATE QID  - PRN ANTIEMETICS  ; S/P DOSE OF REGLAN [WITH MINIMAL IMPROVEMENT]  - MONITORING FOR SYMPTOMS  - WHILE ON DIET PATIENT REPEATEDLY COUNSELLED TO CHEW FOOD CAUTIOUSLY AND CONSUME SMALL BITES W/ REGULAR CLEARING WITH WATER BETWEEN BITES    - ADVANCE DIET AS TOLERATED  - NOTED CT A/P    - S/P RECENT PRBC TRANSFUSION     - GI CONSULT  - SURGERY CONSULT    # LESS LIKELY CHOLECYSTITIS  - S/P ABX  - NOTED CT A/P  - HIDA SCAN - NEGATIVE  - SURGERY CONSULT    # ELEVATED TROPONINS - ? DEMAND ISCHEMIA    - S/P TELEMETRY  - TREND TROPONINS  - ECHO - TRACE MR, MODERATELY INCREASED LV WALL THICKNESS, NORMAL LV SYSTOLIC FUNCTION, SEVERE PULMONARY HTN  - CARDIOLOGY CONSULT    # EPISODE OF HYPOGLYCEMIA - IMPROVED  # GASTROPARESIS  # UNDERLYING DM  - SSI + FS  - COUNSELLED TO COMPLY WITH HD TO REDUCE UREMIA    - REPEATEDLY COUNSELLED TO COMPLY WITH FINGERSTICKS      # DEPRESSION  - PLACED ON ZOLOFT  - PSYCHIATRY CONSULT    # PATIENT UNDERGOING OUTPATIENT WORKUP FOR CENTRAL VENOUS STENOSIS THROUGH NEPHROLOGY TEAM  - ? s/p STENT PLACEMENT    # ANEMIA OF CKD  # HX OF PANCYTOPENIA   - TREND HGB, TRANSFUSION THRESHOLD HGB < 7; PATIENT STILL INTERMITTENTLY REFUSING BLOODWORK, COUNSELLED TO COMPLY  - TYPE AND SCREEN  - ON EPO  - DENIES RECENT HEMATEMESIS, MELENA, HEMATOCHEZIA    - S/P RECENT PRBC TRANSFUSION  - S/P PRBC TRANSFUSION     - PPI BID, CARAFATE QID    # SEVERE PROTEIN CALORIE MALNUTRITION, FAILURE TO THRIVE   -  TEMPORAL WASTING, LOSS OF MUSCLE MASS FROM SHOULDER AND HIP GIRDLE  - NUTRITIONAL SUPPLEMENT    # HLD  # PARTIALLY BLIND  # S/P LEFT BKA  # HX OF PVD  # LS SPINAL STENOSIS  # GI AND DVT PPX     Patient is a 62y old  Male who presents with a chief complaint of Missed dialysis (13 Aug 2023 12:31)    PATIENT IS SEEN AND EXAMINED IN MEDICAL FLOOR.    ALLERGIES:  No Known Drug Allergies  fish (Rash)  liver (Anaphylaxis)      Daily     Daily Weight in k.2 (14 Aug 2023 04:45)    VITALS:    Vital Signs Last 24 Hrs  T(C): 36.6 (14 Aug 2023 04:45), Max: 37.2 (13 Aug 2023 20:24)  T(F): 97.8 (14 Aug 2023 04:45), Max: 99 (13 Aug 2023 20:24)  HR: 96 (14 Aug 2023 04:45) (80 - 96)  BP: 148/64 (14 Aug 2023 04:45) (141/70 - 148/64)  BP(mean): --  RR: 18 (14 Aug 2023 04:45) (17 - 18)  SpO2: 100% (14 Aug 2023 04:45) (100% - 100%)    Parameters below as of 14 Aug 2023 04:45  Patient On (Oxygen Delivery Method): nasal cannula  O2 Flow (L/min): 2      LABS:              CAPILLARY BLOOD GLUCOSE      POCT Blood Glucose.: 108 mg/dL (14 Aug 2023 08:09)  POCT Blood Glucose.: 110 mg/dL (13 Aug 2023 21:05)  POCT Blood Glucose.: 149 mg/dL (13 Aug 2023 16:46)  POCT Blood Glucose.: 118 mg/dL (13 Aug 2023 11:25)          Creatinine Trend: 12.90<--, 11.70<--, 17.50<--, 17.40<--, 16.90<--, 15.40<--  I&O's Summary          .Blood Blood-Peripheral  - @ 14:07   No Growth Final  --  --          MEDICATIONS:    MEDICATIONS  (STANDING):  albuterol    90 MICROgram(s) HFA Inhaler 2 Puff(s) Inhalation every 6 hours  amLODIPine   Tablet 10 milliGRAM(s) Oral daily  aspirin enteric coated 81 milliGRAM(s) Oral daily  atorvastatin 40 milliGRAM(s) Oral at bedtime  budesonide 160 MICROgram(s)/formoterol 4.5 MICROgram(s) Inhaler 2 Puff(s) Inhalation two times a day  chlorhexidine 2% Cloths 1 Application(s) Topical daily  dextrose 5%. 1000 milliLiter(s) (50 mL/Hr) IV Continuous <Continuous>  dextrose 5%. 1000 milliLiter(s) (100 mL/Hr) IV Continuous <Continuous>  dextrose 50% Injectable 25 Gram(s) IV Push once  dextrose 50% Injectable 25 Gram(s) IV Push once  dextrose 50% Injectable 12.5 Gram(s) IV Push once  epoetin beverley-epbx (RETACRIT) Injectable 84041 Unit(s) IV Push <User Schedule>  folic acid 1 milliGRAM(s) Oral daily  furosemide    Tablet 40 milliGRAM(s) Oral daily  glucagon  Injectable 1 milliGRAM(s) IntraMuscular once  hydrALAZINE 25 milliGRAM(s) Oral three times a day  latanoprost 0.005% Ophthalmic Solution 1 Drop(s) Both EYES at bedtime  levothyroxine 25 MICROGram(s) Oral daily  lidocaine   4% Patch 2 Patch Transdermal daily  melatonin 3 milliGRAM(s) Oral at bedtime  metoprolol succinate ER 50 milliGRAM(s) Oral daily  pantoprazole    Tablet 40 milliGRAM(s) Oral before breakfast  sertraline 200 milliGRAM(s) Oral daily  sevelamer carbonate 800 milliGRAM(s) Oral three times a day with meals  sucralfate 1 Gram(s) Oral four times a day      MEDICATIONS  (PRN):  acetaminophen     Tablet .. 975 milliGRAM(s) Oral every 8 hours PRN Moderate Pain (4 - 6)  dextrose Oral Gel 15 Gram(s) Oral once PRN Blood Glucose LESS THAN 70 milliGRAM(s)/deciliter  ondansetron   Disintegrating Tablet 4 milliGRAM(s) Oral two times a day PRN Nausea and/or Vomiting  ondansetron Injectable 4 milliGRAM(s) IV Push every 6 hours PRN Nausea and/or Vomiting      REVIEW OF SYSTEMS:                           ALL ROS DONE [ X   ]    CONSTITUTIONAL:  LETHARGIC [   ], FEVER [   ], UNRESPONSIVE [   ]  CVS:  CP  [   ], SOB, [   ], PALPITATIONS [   ], DIZZYNESS [   ]  RS: COUGH [   ], SPUTUM [   ]  GI: ABDOMINAL PAIN [   ], NAUSEA [   ], VOMITINGS [   ], DIARRHEA [   ], CONSTIPATION [   ]  :  DYSURIA [   ], NOCTURIA [   ], INCREASED FREQUENCY [   ], DRIBLING [   ],  SKELETAL: PAINFUL JOINTS [   ], SWOLLEN JOINTS [   ], NECK ACHE [   ], LOW BACK ACHE [   ],  SKIN : ULCERS [   ], RASH [   ], ITCHING [   ]  CNS: HEAD ACHE [   ], DOUBLE VISION [   ], BLURRED VISION [   ], AMS / CONFUSION [   ], SEIZURES [   ], WEAKNESS [   ],TINGLING / NUMBNESS [   ]      PHYSICAL EXAMINATION:    GENERAL APPEARANCE: NO DISTRESS,    ANASARCA ++  HEENT:  NO PALLOR, NO  JVD,  NO   NODES, NECK SUPPLE  CVS: S1 +, S2 +,   RS: AEEB,  OCCASIONAL  RALES +,   CRACKLES+ AT LUNG BASES  ABD: SOFT, NT, NO, BS +  EXT: LEFT BKA  SKIN: WARM,   SKELETAL:  ROM ACCEPTABLE  CNS:  AAO X  2-3        RADIOLOGY :    RADIOLOGY AND READINGS REVIEWED          ASSESSMENT :     Hypervolemia    Hypertension    Adrenal insufficiency    CKD (chronic kidney disease)    Anemia    Glaucoma    Coronary artery disease    HLD (hyperlipidemia)    Peripheral vascular disease    Spinal stenosis of lumbosacral region    Hyperparathyroidism    Diabetes mellitus    Diabetic neuropathy    Contracture of hand    Osteoarthritis    Osteoporosis    Vision loss of left eye    ESRD on hemodialysis    Cataract    BPH (benign prostatic hyperplasia)    UTI (urinary tract infection)    Bladder mass    H/O hematuria    Osteoporosis    Vision loss of right eye    Depression    Chronic GERD    Osteomyelitis of vertebra    CHF (congestive heart failure)    Below knee amputation status, left    History of right cataract extraction    History of left cataract extraction    S/P arteriovenous (AV) fistula creation    H/O hematuria    H/O transurethral destruction of bladder lesion    History of excision of mass        PLAN:  HPI:  Patient is 62 year old male from [facility], walks with RW, with PMH of ESRD on HD (TTS), BKA- L w/prosthetic leg, DM, Left eye blindness, CHF, CAD, HTN, HLD, osteoporosis, bronchitis, current smoker, and anemia who presents with complaint of missed dialysis. Last HD . Pt states he often missed HD sessions.  patient states he missed this HD session due to mild pain in left leg, patient remains able to ambulate. Pt complains of right leg swelling and states he does not make any urine. Patient states he was having mild shortness of breath.  Pt denies any fever, chills, N/V/D, constipation, CP, numbness or tingling (2023 19:20)    # [8/9] MEETING HELD WITH PATIENT, BROTHER ARTEMIO @ [phone] AND SW TEAM. PATIENT W/ HISTORY OF ROUTINE NONCOMPLIANCE W/ LIFE-SUSTAINING TREATMENT [HD], EVALUATIONS AND INTERVENTIONS - COUNSELLED AT LENGTH TO REMAIN COMPLIANT. DISCUSSED THAT PROGNOSIS IS POOR/GRIM GIVEN UNDERLYING MEDICAL CONDITIONS AND GIVEN NONCOMPLIANCE. HE VERBALIZED UNDERSTANDING. REGARDING GOC - PATIENT WISHES FOR FULL CODE. PALLIATIVE CARE CONSULTED. PSYCHIATRY CONSULTED. PATIENT EXPRESSED THAT HE DOES GROW IMPATIENT DURING DIALYSIS SESSIONS AND HAS BEEN LEAVING SESSIONS SOONER THAN PRESCRIBED. IN DISCUSSION THAT RISKS OF DEFERRING COMPLETE HD - INCLUDE BUT ARE NOT LIMITED TO WORSENING CLINICAL CONDITION, ELECTROLYTE ABNORMALITIES, ARRHYTHMIA AND DEATH. HE VERBALIZED UNDERSTANDING. D/W PATIENT THAT REPEATEDLY REFUSING INTERVENTIONS AND ASSESSMENTS IS NOT IN LINE WITH HIS WISHES TO IMPROVE. PATIENT VERBALIZED UNDERSTANDING. DISCUSSED REGARDING CURRENT CLINICAL CONDITION, RECOMMENDED EVALUATIONS AND INTERVENTIONS. ALL QUESTIONS ANSWERED. TEAM HAS OFFERED PATIENT EMOTIONAL SUPPORT AND OFFERED MEDICATION FOR MOOD. OFFERED TO PRE-MEDICATE W/ ANXIOLYTIC PRIOR TO HD. PATIENT IS AGREEABLE.     DISCUSSED THAT PATIENT WILL NEED TO COMPLY WITH HD SESSIONS IN ORDER TO BE MEDICALLY OPTIMIZED FOR DISCHARGE AND FOR SAFE D/C PLANNING. TEAM DISCUSSED OPTIONS OF RETURNING TO [facility] W/ OUTPATIENT HD , IF CARE NEEDS CAN BE SAFELY MET VS. NURSING HOME/[facility] . PATIENT AND BROTHER VERBALIZED UNDERSTANDING.       # ACUTE ON CHRONIC HYPOXIC RESPIRATORY FAILURE S/T PULMONARY EDEMA - S/T INCOMPLETE HD SESSIONS ; ANASARCA  # HYPERKALEMIA  # HX OF COPD + S/P RECENT MULTIFOCAL PNEUMONIA ; R/O ASPIRATION PNEUMONIA  # PULMONARY HYPERTENSION  # UNCONTROLLED HTN  # ESRD ON HD TTS - W/ HX OF NONCOMPLIANCE    - TELEMETRY  - HYDRALAZINE, METOPROLOL AND NORVASC ; PRN IV ANTIHYPERTENSIVE  - LOKELMA  - SUPPLEMENTAL O2  - PLANNED FOR HD  - NEPHROLOGY CONSULT  - PULMONOLOGY CONSULT  - ID CONSULT    - CONTINUES TO REFUSE OR PRE-MATURELY DISCONTINUE SESSIONS OF HD       - [] - OVERNIGHT RAPID RESPONSE S/T HYPOXIA - SELF-LIMITED - PATIENT IMPROVED S/P O2 SUPPLEMENT  - S/P CEFEPIME + FLAGYLL, BCX - NGTD      # RECURRENT INTRACTABLE NAUSEA/VOMITING, ? COFFEE GROUND EMESIS  # GASTROPARESIS  # HISTORY OF ESOPHAGITIS AND DUODENITIS [2021], HX OF GASTROPARESIS W/ EVIDENCE OF THICKENED ESOPHAGUS ON PREVIOUS CT SCAN   # PANCREAS W/ DOUBLE DUCT SIGN    - MONITORING HGB, PLACED ON PPI BID , CARAFATE QID  - PRN ANTIEMETICS  ; S/P DOSE OF REGLAN [WITH MINIMAL IMPROVEMENT]  - MONITORING FOR SYMPTOMS  - WHILE ON DIET PATIENT REPEATEDLY COUNSELLED TO CHEW FOOD CAUTIOUSLY AND CONSUME SMALL BITES W/ REGULAR CLEARING WITH WATER BETWEEN BITES    - ADVANCE DIET AS TOLERATED  - NOTED CT A/P    - S/P RECENT PRBC TRANSFUSION     - GI CONSULT  - SURGERY CONSULT    - [] - EPISODE OF NAUSEA/VOMITING OVER THE WEEKEND - IMPROVED    # LESS LIKELY CHOLECYSTITIS  - S/P ABX  - NOTED CT A/P  - HIDA SCAN - NEGATIVE  - SURGERY CONSULT    # ELEVATED TROPONINS - ? DEMAND ISCHEMIA    - S/P TELEMETRY  - TREND TROPONINS  - ECHO - TRACE MR, MODERATELY INCREASED LV WALL THICKNESS, NORMAL LV SYSTOLIC FUNCTION, SEVERE PULMONARY HTN  - CARDIOLOGY CONSULT    # EPISODE OF HYPOGLYCEMIA - IMPROVED  # GASTROPARESIS  # UNDERLYING DM  - SSI + FS  - COUNSELLED TO COMPLY WITH HD TO REDUCE UREMIA    - REPEATEDLY COUNSELLED TO COMPLY WITH FINGERSTICKS      # DEPRESSION  - PLACED ON ZOLOFT  - PSYCHIATRY CONSULT    # PATIENT UNDERGOING OUTPATIENT WORKUP FOR CENTRAL VENOUS STENOSIS THROUGH NEPHROLOGY TEAM  - ? s/p STENT PLACEMENT    # ANEMIA OF CKD  # HX OF PANCYTOPENIA   - TREND HGB, TRANSFUSION THRESHOLD HGB < 7; PATIENT STILL INTERMITTENTLY REFUSING BLOODWORK, COUNSELLED TO COMPLY  - TYPE AND SCREEN  - ON EPO  - DENIES RECENT HEMATEMESIS, MELENA, HEMATOCHEZIA    - S/P RECENT PRBC TRANSFUSION  - S/P PRBC TRANSFUSION     - PPI BID, CARAFATE QID    # SEVERE PROTEIN CALORIE MALNUTRITION, FAILURE TO THRIVE   -  TEMPORAL WASTING, LOSS OF MUSCLE MASS FROM SHOULDER AND HIP GIRDLE  - NUTRITIONAL SUPPLEMENT    # HLD  # PARTIALLY BLIND  # S/P LEFT BKA  # HX OF PVD  # LS SPINAL STENOSIS  # GI AND DVT PPX

## 2023-08-14 NOTE — PROGRESS NOTE ADULT - PROBLEM SELECTOR PLAN 12
- Continue to hold heparin due to ongoing anemia and concern for possible gastric ulcer  - VTE: SCD   - C/w Protonix for GI ppx    Discharge planning: pt from [facility]   recommended for DIEGO   Opts to return back to [facility]   pending 4 successful HD prior D/C

## 2023-08-14 NOTE — PROGRESS NOTE ADULT - SUBJECTIVE AND OBJECTIVE BOX
DATE OF SERVICE: 08-14-23    Patient denies chest pain or shortness of breath.   Review of symptoms otherwise negative.    acetaminophen     Tablet .. 975 milliGRAM(s) Oral every 8 hours PRN  albuterol    90 MICROgram(s) HFA Inhaler 2 Puff(s) Inhalation every 6 hours  amLODIPine   Tablet 10 milliGRAM(s) Oral daily  aspirin enteric coated 81 milliGRAM(s) Oral daily  atorvastatin 40 milliGRAM(s) Oral at bedtime  budesonide 160 MICROgram(s)/formoterol 4.5 MICROgram(s) Inhaler 2 Puff(s) Inhalation two times a day  chlorhexidine 2% Cloths 1 Application(s) Topical daily  dextrose 5%. 1000 milliLiter(s) IV Continuous <Continuous>  dextrose 5%. 1000 milliLiter(s) IV Continuous <Continuous>  dextrose 50% Injectable 25 Gram(s) IV Push once  dextrose 50% Injectable 12.5 Gram(s) IV Push once  dextrose 50% Injectable 25 Gram(s) IV Push once  dextrose Oral Gel 15 Gram(s) Oral once PRN  epoetin beverley-epbx (RETACRIT) Injectable 28451 Unit(s) IV Push <User Schedule>  folic acid 1 milliGRAM(s) Oral daily  furosemide    Tablet 40 milliGRAM(s) Oral daily  glucagon  Injectable 1 milliGRAM(s) IntraMuscular once  hydrALAZINE 25 milliGRAM(s) Oral three times a day  latanoprost 0.005% Ophthalmic Solution 1 Drop(s) Both EYES at bedtime  levothyroxine 25 MICROGram(s) Oral daily  lidocaine   4% Patch 2 Patch Transdermal daily  melatonin 3 milliGRAM(s) Oral at bedtime  metoprolol succinate ER 50 milliGRAM(s) Oral daily  ondansetron   Disintegrating Tablet 4 milliGRAM(s) Oral two times a day PRN  ondansetron Injectable 4 milliGRAM(s) IV Push every 6 hours PRN  pantoprazole    Tablet 40 milliGRAM(s) Oral before breakfast  sertraline 200 milliGRAM(s) Oral daily  sevelamer carbonate 800 milliGRAM(s) Oral three times a day with meals  sucralfate 1 Gram(s) Oral four times a day                            7.7    3.38  )-----------( x        ( 14 Aug 2023 10:03 )             25.4       Hemoglobin: 7.7 g/dL (08-14 @ 10:03)  Hemoglobin: 8.3 g/dL (08-11 @ 06:23)  Hemoglobin: 8.0 g/dL (08-10 @ 06:21)      08-14    133<L>  |  98  |  25<H>  ----------------------------<  112<H>  4.4   |  27  |  11.70<H>    Ca    7.5<L>      14 Aug 2023 10:03  Phos  4.8     08-14  Mg     2.4     08-14      Creatinine Trend: 11.70<--, 12.90<--, 11.70<--, 17.50<--, 17.40<--, 16.90<--    COAGS:           T(C): 36.5 (08-14-23 @ 09:53), Max: 37.2 (08-13-23 @ 20:24)  HR: 88 (08-14-23 @ 09:53) (80 - 96)  BP: 154/84 (08-14-23 @ 09:53) (141/70 - 154/84)  RR: 17 (08-14-23 @ 09:53) (17 - 18)  SpO2: 100% (08-14-23 @ 09:53) (100% - 100%)  Wt(kg): --    I&O's Summary      HEENT:  (-)icterus (-)pallor  CV: N S1 S2 1/6 DIANA (+)2 Pulses B/l  Resp:  Clear to ausculatation B/L, normal effort  GI: (+) BS Soft, NT, ND  Lymph:  (-)Edema, (-)obvious lymphadenopathy  Skin: Warm to touch, Normal turgor  Psych: Appropriate mood and affect         ASSESSMENT/PLAN: 	62y Male  Mateus Murray, walks with RW, with PMH of ESRD on HD (TTS), BKA- L w/prosthetic leg, DM, Left eye blindness, CHF,, HTN, HLD, EF 45-50%, normal perfusion 4/23 osteoporosis, bronchitis, current smoker, and anemia who presents with complaint of missed dialysis.     # CHF  - Due to missed HD  - HD per renal    # Positive trop  - nothing to suggest ACS.  His trop has been higher in the past and demonstrated normal perfusion on Stress 4/23  - ECHO noted, normal LV fx severe pulm HTN, cont to keep net negative     # Noncompliance  - Behavioral health f/u  - refusing meds and blood tests as well as HD at times     # Smoking  - Cessation stressed    # HTN  - Suspect BP will improve once euvolemic, slowly improving     Dominic Still MD, [facility]  BEEPER [phone]

## 2023-08-14 NOTE — PROGRESS NOTE ADULT - ASSESSMENT
62 year old male from [facility], walks with RW, with PMH of ESRD on HD (TTS), BKA- L w/prosthetic leg, DM, Left eye blindness, CHF, CAD, HTN, HLD, osteoporosis, bronchitis, current smoker, and anemia who presents with complaint of missed dialysis and shortness of breath. Admitted to telemetry for Acute Hyperkalemia 2/2 missed dialysis found to have pulmonary edema and BNP [id number]. Cardiology and nephro following. Hospital course complicated by hypoglycemia, and rapid response for AHRF. Hospital course c/b patient refusing HD, IVs, medications. Family meeting held with interdisciplinary team to discuss GOC. Patient agreeable to dialysis and medical treatment. Requires 4 HD sessions prior to dc. To complete 3rd session today 8/14.

## 2023-08-14 NOTE — PROGRESS NOTE ADULT - PROBLEM SELECTOR PLAN 2
- Echo from 2/2023 shows EF 45-50%, and grade 1 DD. Compared with echocardiogram report of 2/24/2023, LVEF, improved on current study.  - C/w Lasix   - Cardiology-Dr. Cheko riddle   - Pulmonology-Dr. Getrrude riddle

## 2023-08-14 NOTE — PROGRESS NOTE ADULT - PROBLEM SELECTOR PLAN 3
H/o ESRD on HD (T/Th/Sat)  - Pt refused HD on 8/5.  Completed 8 minutes on HD on 8/8.  Completed 2.5h HD on 8/9.    - Last HD 8/11  - will need 4 successful HD sessions prior to D/C , complete 2/4  - plan for HD today 8/14  - will premedicate prior to HD with Ativan 2mg IVP   - Nephro-Dr. Mosher, following

## 2023-08-14 NOTE — PROGRESS NOTE ADULT - SUBJECTIVE AND OBJECTIVE BOX
NP Note discussed with  Primary Attending    Patient is a 62y old  Male who presents with a chief complaint of Missed dialysis (14 Aug 2023 09:10)      INTERVAL HPI/OVERNIGHT EVENTS: no acute events overnight    MEDICATIONS  (STANDING):  albuterol    90 MICROgram(s) HFA Inhaler 2 Puff(s) Inhalation every 6 hours  amLODIPine   Tablet 10 milliGRAM(s) Oral daily  aspirin enteric coated 81 milliGRAM(s) Oral daily  atorvastatin 40 milliGRAM(s) Oral at bedtime  budesonide 160 MICROgram(s)/formoterol 4.5 MICROgram(s) Inhaler 2 Puff(s) Inhalation two times a day  chlorhexidine 2% Cloths 1 Application(s) Topical daily  dextrose 5%. 1000 milliLiter(s) (50 mL/Hr) IV Continuous <Continuous>  dextrose 5%. 1000 milliLiter(s) (100 mL/Hr) IV Continuous <Continuous>  dextrose 50% Injectable 25 Gram(s) IV Push once  dextrose 50% Injectable 12.5 Gram(s) IV Push once  dextrose 50% Injectable 25 Gram(s) IV Push once  epoetin beverley-epbx (RETACRIT) Injectable 14244 Unit(s) IV Push <User Schedule>  folic acid 1 milliGRAM(s) Oral daily  furosemide    Tablet 40 milliGRAM(s) Oral daily  glucagon  Injectable 1 milliGRAM(s) IntraMuscular once  hydrALAZINE 25 milliGRAM(s) Oral three times a day  latanoprost 0.005% Ophthalmic Solution 1 Drop(s) Both EYES at bedtime  levothyroxine 25 MICROGram(s) Oral daily  lidocaine   4% Patch 2 Patch Transdermal daily  melatonin 3 milliGRAM(s) Oral at bedtime  metoprolol succinate ER 50 milliGRAM(s) Oral daily  pantoprazole    Tablet 40 milliGRAM(s) Oral before breakfast  sertraline 200 milliGRAM(s) Oral daily  sevelamer carbonate 800 milliGRAM(s) Oral three times a day with meals  sucralfate 1 Gram(s) Oral four times a day    MEDICATIONS  (PRN):  acetaminophen     Tablet .. 975 milliGRAM(s) Oral every 8 hours PRN Moderate Pain (4 - 6)  dextrose Oral Gel 15 Gram(s) Oral once PRN Blood Glucose LESS THAN 70 milliGRAM(s)/deciliter  ondansetron   Disintegrating Tablet 4 milliGRAM(s) Oral two times a day PRN Nausea and/or Vomiting  ondansetron Injectable 4 milliGRAM(s) IV Push every 6 hours PRN Nausea and/or Vomiting      __________________________________________________  REVIEW OF SYSTEMS:    CONSTITUTIONAL: No fever,   EYES: no acute visual disturbances  NECK: No pain or stiffness  RESPIRATORY: No cough; No shortness of breath  CARDIOVASCULAR: No chest pain, no palpitations  GASTROINTESTINAL: No pain. No nausea or vomiting; No diarrhea   NEUROLOGICAL: No headache or numbness, no tremors  MUSCULOSKELETAL: No joint pain, no muscle pain  GENITOURINARY: no dysuria, no frequency, no hesitancy  PSYCHIATRY: no depression , no anxiety  ALL OTHER  ROS negative        Vital Signs Last 24 Hrs  T(C): 36.6 (14 Aug 2023 04:45), Max: 37.2 (13 Aug 2023 20:24)  T(F): 97.8 (14 Aug 2023 04:45), Max: 99 (13 Aug 2023 20:24)  HR: 96 (14 Aug 2023 04:45) (80 - 96)  BP: 148/64 (14 Aug 2023 04:45) (141/70 - 148/64)  BP(mean): --  RR: 18 (14 Aug 2023 04:45) (17 - 18)  SpO2: 100% (14 Aug 2023 04:45) (100% - 100%)    Parameters below as of 14 Aug 2023 04:45  Patient On (Oxygen Delivery Method): nasal cannula  O2 Flow (L/min): 2      ________________________________________________  PHYSICAL EXAM:  GENERAL: NAD  HEENT: Normocephalic  NECK : supple  CHEST/LUNG: Clear to auscultation bilaterally   HEART: S1 S2  regular;  ABDOMEN: Soft, Nontender, Nondistended; Bowel sounds present  EXTREMITIES: no cyanosis; no edema; no calf tenderness; L BKA, R AVF  SKIN: warm and dry; no rash  NERVOUS SYSTEM:  Awake and alert; Oriented  to place, person and time    _________________________________________________  LABS:              CAPILLARY BLOOD GLUCOSE      POCT Blood Glucose.: 108 mg/dL (14 Aug 2023 08:09)  POCT Blood Glucose.: 110 mg/dL (13 Aug 2023 21:05)  POCT Blood Glucose.: 149 mg/dL (13 Aug 2023 16:46)  POCT Blood Glucose.: 118 mg/dL (13 Aug 2023 11:25)        RADIOLOGY & ADDITIONAL TESTS:    < from: NM Hepatobiliary Imaging w/ RX (07.31.23 @ 10:44) >  INTERPRETATION:  CLINICAL INFORMATION: 62-year-old man with RIGHT UPPER   quadrant pain referred for evaluation of acute cholecystitis.    DURATION of DYNAMIC SERIES: 45 minutes  RADIOPHARMACEUTICAL: 3.2 mCi Tc-99m-Mebrofenin, I.V.    TECHNIQUE: Dynamic imaging of the anterior abdomen was performed   following radiopharmaceutical injection. Patient declined static images.    COMPARISON: None    OTHER STUDIES USED FOR CORRELATION: CT abdomen/pelvis 7/30/2023    FINDINGS: There is prompt, homogeneous uptake of radiopharmaceutical by   the hepatocytes. Activity is seen in the gallbladder at approximately 15   minutes. Patient declined further imaging before bowel was visualized.   There is fair clearance of activity from the liver by the end of the   obtained images..    IMPRESSION: Patient declined to complete the full exam and bowel was not   visualized on the available images. Otherwise unremarkable exam with no   evidence of acute cholecystitis or biliary obstruction.        --- End of Report ---    < end of copied text >    < from: CT Abdomen and Pelvis No Cont (07.30.23 @ 13:35) >  FINDINGS: The examination is limited by lack of IV contrast.  LOWER CHEST: Small bilateral pleural effusions. Small bilateral   atelectasis. Nonspecific groundglass densities in the right lower lung   zone; clinical correlation with pneumonia is suggested. Cardiomegaly and   mild pericardial effusion, unchanged.    LIVER: Within normal limits.  BILE DUCTS: Dilated common bile duct again noted.  GALLBLADDER: Small gallstones. Nonspecific trace pericholecystic fluid.  SPLEEN: Within normal limits.  PANCREAS: Dilated main pancreaticduct is again noted.  ADRENALS: The right adrenal appears unremarkable. Nonspecific left   adrenal thickening.  KIDNEYS/URETERS: Within normal limits except for a few small hypodense   lesions in the kidneys bilaterally, representing probable cysts.    BLADDER: Underdistended.  REPRODUCTIVE ORGANS: The prostate and seminal vesicles appear grossly   unremarkable.    BOWEL: No bowel obstruction. Appendix unremarkable. Nonspecific mild   distal esophageal mural thickening.  PERITONEUM: Small ascites. No free air.  VESSELS: Calcified atherosclerotic disease.  RETROPERITONEUM/LYMPH NODES: No lymphadenopathy.  ABDOMINAL WALL: Anasarca again noted.  BONES: No acute CT findings.    IMPRESSION: Small gallstones. Nonspecific trace pericholecystic fluid..   If there is a clinical suspicion for acute cholecystitis, gallbladder   ultrasound/HIDA scan may be pursued for further evaluation.    Stable dilated common bile duct and main pancreatic duct. Please see   previous MRCP dated 2/23/2023.    No bowel obstruction. Appendix unremarkable. Nonspecific mild distal   esophageal mural thickening.    Small ascites.    Anasarca.    Small bilateral pleural effusions. Small bilateral atelectasis.   Nonspecific groundglass densities in the right lower lungzone; clinical   correlation with pneumonia is suggested.    Cardiomegaly and mild pericardial effusion, unchanged.    --- End of Report ---    < end of copied text >      Imaging Personally Reviewed:  YES    Consultant(s) Notes Reviewed:   YES      Plan of care was discussed with patient and /or primary care giver; all questions and concerns were addressed and care was aligned with patient's wishes.

## 2023-08-14 NOTE — PROGRESS NOTE ADULT - ASSESSMENT
# ESRD. seen on HD   UF as tolerated  D/W pt compliance with HD as prescribed.  # anemia of CKD. epogen on HD. transfuse for HBG <7  #CKDMBD. phos at goal

## 2023-08-14 NOTE — PROGRESS NOTE ADULT - SUBJECTIVE AND OBJECTIVE BOX
[facility] : Progress Note :: [phone], (office [phone]),   Dr Mosher / Dr Washington / Dr Young / Dr Doll / Dr Varsha TURCIOS / Dr Tello / Dr Garcia / Dr Larry qiu  _____________________________________________________________________________________________    seen on hemodialysis     No Known Drug Allergies  fish (Rash)  liver (Anaphylaxis)    Hospital Medications:   MEDICATIONS  (STANDING):  albuterol    90 MICROgram(s) HFA Inhaler 2 Puff(s) Inhalation every 6 hours  amLODIPine   Tablet 10 milliGRAM(s) Oral daily  aspirin enteric coated 81 milliGRAM(s) Oral daily  atorvastatin 40 milliGRAM(s) Oral at bedtime  budesonide 160 MICROgram(s)/formoterol 4.5 MICROgram(s) Inhaler 2 Puff(s) Inhalation two times a day  chlorhexidine 2% Cloths 1 Application(s) Topical daily  dextrose 5%. 1000 milliLiter(s) (50 mL/Hr) IV Continuous <Continuous>  dextrose 5%. 1000 milliLiter(s) (100 mL/Hr) IV Continuous <Continuous>  dextrose 50% Injectable 25 Gram(s) IV Push once  dextrose 50% Injectable 12.5 Gram(s) IV Push once  dextrose 50% Injectable 25 Gram(s) IV Push once  epoetin beverley (PROCRIT) Injectable 15759 Unit(s) IV Push <User Schedule>  epoetin beverley-epbx (RETACRIT) Injectable 67359 Unit(s) IV Push <User Schedule>  folic acid 1 milliGRAM(s) Oral daily  furosemide    Tablet 40 milliGRAM(s) Oral daily  glucagon  Injectable 1 milliGRAM(s) IntraMuscular once  hydrALAZINE 25 milliGRAM(s) Oral three times a day  latanoprost 0.005% Ophthalmic Solution 1 Drop(s) Both EYES at bedtime  levothyroxine 25 MICROGram(s) Oral daily  lidocaine   4% Patch 2 Patch Transdermal daily  melatonin 3 milliGRAM(s) Oral at bedtime  metoprolol succinate ER 50 milliGRAM(s) Oral daily  pantoprazole    Tablet 40 milliGRAM(s) Oral before breakfast  sertraline 200 milliGRAM(s) Oral daily  sevelamer carbonate 800 milliGRAM(s) Oral three times a day with meals  sucralfate 1 Gram(s) Oral four times a day      VITALS:  T(F): 97.7 (08-14-23 @ 09:53), Max: 99 (08-13-23 @ 20:24)  HR: 88 (08-14-23 @ 09:53)  BP: 154/84 (08-14-23 @ 09:53)  RR: 17 (08-14-23 @ 09:53)  SpO2: 100% (08-14-23 @ 09:53)  Wt(kg): --      PHYSICAL EXAM:  Constitutional: NAD  HEENT: anicteric sclera, oropharynx clear,  Neck: No JVD  Respiratory: CTAB, no wheezes, rales or rhonchi  Cardiovascular: S1, S2, RRR  Gastrointestinal: BS+, soft, NT/ND  Extremities: No peripheral edema  Neurological: A/O x 3, no focal deficits  : No CVA tenderness. No [place].   Skin: No rashes  Vascular Access: AVF cannulated    LABS:  08-14    133<L>  |  98  |  25<H>  ----------------------------<  112<H>  4.4   |  27  |  11.70<H>    Ca    7.5<L>      14 Aug 2023 10:03  Phos  4.8     08-14  Mg     2.4     08-14      Creatinine Trend: 11.70 <--, 12.90 <--, 11.70 <--, 17.50 <--, 17.40 <--                        7.7    3.38  )-----------( 96       ( 14 Aug 2023 10:03 )             25.4     Urine Studies:  Urinalysis Basic - ( 14 Aug 2023 10:03 )    Color:  / Appearance:  / SG:  / pH:   Gluc: 112 mg/dL / Ketone:   / Bili:  / Urobili:    Blood:  / Protein:  / Nitrite:    Leuk Esterase:  / RBC:  / WBC    Sq Epi:  / Non Sq Epi:  / Bacteria:         RADIOLOGY & ADDITIONAL STUDIES:

## 2023-08-15 NOTE — PROGRESS NOTE ADULT - ASSESSMENT
62 year old male from [facility], walks with RW, with PMH of ESRD on HD (TTS), BKA- L w/prosthetic leg, DM, Left eye blindness, CHF, CAD, HTN, HLD, osteoporosis, bronchitis, current smoker, and anemia who presents with complaint of missed dialysis and shortness of breath. Admitted to telemetry for Acute Hyperkalemia 2/2 missed dialysis found to have pulmonary edema and BNP [id number]. Cardiology and nephro following. Hospital course complicated by hypoglycemia, and rapid response for AHRF. Hospital course c/b patient refusing HD, IVs, medications. Family meeting held with interdisciplinary team to discuss GOC. Patient agreeable to dialysis and medical treatment.   Pending PT consult 62 year old male from [facility], walks with RW, with PMH of ESRD on HD (TTS), BKA- L w/prosthetic leg, DM, Left eye blindness, CHF, CAD, HTN, HLD, osteoporosis, bronchitis, current smoker, and anemia who presents with complaint of missed dialysis and shortness of breath. Admitted to telemetry for Acute Hyperkalemia 2/2 missed dialysis found to have pulmonary edema and BNP [id number]. Cardiology and nephro following. Hospital course complicated by hypoglycemia, and rapid response for AHRF. Hospital course c/b patient refusing HD, IVs, medications. Family meeting held with interdisciplinary team to discuss GOC. Patient agreeable to dialysis and medical treatment.   Will need to fully complete 4 dialysis session before d/c  Pending PT consult

## 2023-08-15 NOTE — PROGRESS NOTE ADULT - PROBLEM SELECTOR PLAN 12
- Continue to hold heparin due to ongoing anemia and concern for possible gastric ulcer  - VTE: SCD   - C/w Protonix for GI ppx    Discharge planning: pt from [facility]   pending 4 successful HD prior D/C if discharge back to [facility]  - possible dispo to [facility] [place] with HD on site  - f/u PT consult - Continue to hold heparin due to ongoing anemia and concern for possible gastric ulcer  - VTE: SCD   - C/w Protonix for GI ppx    Discharge planning: pt from [facility]   pending 4 successful HD prior D/C if discharge back to [facility]  - PT reccs home PT

## 2023-08-15 NOTE — PROGRESS NOTE ADULT - SUBJECTIVE AND OBJECTIVE BOX
Patient is a 62y old  Male who presents with a chief complaint of Missed dialysis (15 Aug 2023 09:38)    PATIENT IS SEEN AND EXAMINED IN MEDICAL FLOOR.  NGT [    ]    JEREMY [   ]      GT [   ]    ALLERGIES:  No Known Drug Allergies  fish (Rash)  liver (Anaphylaxis)      Daily     Daily Weight in k.4 (15 Aug 2023 05:10)    VITALS:    Vital Signs Last 24 Hrs  T(C): 36.9 (15 Aug 2023 05:10), Max: 37.2 (14 Aug 2023 13:27)  T(F): 98.5 (15 Aug 2023 05:10), Max: 99 (14 Aug 2023 13:27)  HR: 87 (15 Aug 2023 05:10) (80 - 87)  BP: 155/62 (15 Aug 2023 05:10) (134/62 - 155/62)  BP(mean): --  RR: 18 (15 Aug 2023 05:10) (17 - 18)  SpO2: 100% (15 Aug 2023 05:10) (100% - 100%)    Parameters below as of 15 Aug 2023 05:10  Patient On (Oxygen Delivery Method): nasal cannula  O2 Flow (L/min): 2      LABS:    CBC Full  -  ( 14 Aug 2023 10:03 )  WBC Count : 3.38 K/uL  RBC Count : 2.82 M/uL  Hemoglobin : 7.7 g/dL  Hematocrit : 25.4 %  Platelet Count - Automated : 96 K/uL  Mean Cell Volume : 90.1 fl  Mean Cell Hemoglobin : 27.3 pg  Mean Cell Hemoglobin Concentration : 30.3 gm/dL  Auto Neutrophil # : x  Auto Lymphocyte # : x  Auto Monocyte # : x  Auto Eosinophil # : x  Auto Basophil # : x  Auto Neutrophil % : x  Auto Lymphocyte % : x  Auto Monocyte % : x  Auto Eosinophil % : x  Auto Basophil % : x      08-14    133<L>  |  98  |  25<H>  ----------------------------<  112<H>  4.4   |  27  |  11.70<H>    Ca    7.5<L>      14 Aug 2023 10:03  Phos  4.8     08-14  Mg     2.4     08-14      CAPILLARY BLOOD GLUCOSE      POCT Blood Glucose.: 88 mg/dL (14 Aug 2023 21:43)  POCT Blood Glucose.: 173 mg/dL (14 Aug 2023 16:42)  POCT Blood Glucose.: 111 mg/dL (14 Aug 2023 12:02)          Creatinine Trend: 11.70<--, 12.90<--, 11.70<--, 17.50<--, 17.40<--, 16.90<--  I&O's Summary    14 Aug 2023 07:01  -  15 Aug 2023 07:00  --------------------------------------------------------  IN: 0 mL / OUT: 4 mL / NET: -4 mL            .Blood Blood-Peripheral  - @ 14:07   No Growth Final  --  --          MEDICATIONS:    MEDICATIONS  (STANDING):  albuterol    90 MICROgram(s) HFA Inhaler 2 Puff(s) Inhalation every 6 hours  amLODIPine   Tablet 10 milliGRAM(s) Oral daily  aspirin enteric coated 81 milliGRAM(s) Oral daily  atorvastatin 40 milliGRAM(s) Oral at bedtime  budesonide 160 MICROgram(s)/formoterol 4.5 MICROgram(s) Inhaler 2 Puff(s) Inhalation two times a day  chlorhexidine 2% Cloths 1 Application(s) Topical daily  dextrose 5%. 1000 milliLiter(s) (50 mL/Hr) IV Continuous <Continuous>  dextrose 5%. 1000 milliLiter(s) (100 mL/Hr) IV Continuous <Continuous>  dextrose 50% Injectable 25 Gram(s) IV Push once  dextrose 50% Injectable 12.5 Gram(s) IV Push once  dextrose 50% Injectable 25 Gram(s) IV Push once  epoetin beverley (PROCRIT) Injectable 39806 Unit(s) IV Push <User Schedule>  epoetin beverley-epbx (RETACRIT) Injectable 80980 Unit(s) IV Push <User Schedule>  folic acid 1 milliGRAM(s) Oral daily  furosemide    Tablet 40 milliGRAM(s) Oral daily  glucagon  Injectable 1 milliGRAM(s) IntraMuscular once  hydrALAZINE 25 milliGRAM(s) Oral three times a day  latanoprost 0.005% Ophthalmic Solution 1 Drop(s) Both EYES at bedtime  levothyroxine 25 MICROGram(s) Oral daily  lidocaine   4% Patch 2 Patch Transdermal daily  melatonin 3 milliGRAM(s) Oral at bedtime  metoprolol succinate ER 50 milliGRAM(s) Oral daily  pantoprazole    Tablet 40 milliGRAM(s) Oral before breakfast  sertraline 200 milliGRAM(s) Oral daily  sevelamer carbonate 800 milliGRAM(s) Oral three times a day with meals  sucralfate 1 Gram(s) Oral four times a day      MEDICATIONS  (PRN):  acetaminophen     Tablet .. 975 milliGRAM(s) Oral every 8 hours PRN Moderate Pain (4 - 6)  dextrose Oral Gel 15 Gram(s) Oral once PRN Blood Glucose LESS THAN 70 milliGRAM(s)/deciliter  LORazepam     Tablet 2 milliGRAM(s) Oral every other day PRN Anxiety  ondansetron   Disintegrating Tablet 4 milliGRAM(s) Oral two times a day PRN Nausea and/or Vomiting  ondansetron Injectable 4 milliGRAM(s) IV Push every 6 hours PRN Nausea and/or Vomiting      REVIEW OF SYSTEMS:                           ALL ROS DONE [ X   ]    CONSTITUTIONAL:  LETHARGIC [   ], FEVER [   ], UNRESPONSIVE [   ]  CVS:  CP  [   ], SOB, [   ], PALPITATIONS [   ], DIZZYNESS [   ]  RS: COUGH [   ], SPUTUM [   ]  GI: ABDOMINAL PAIN [   ], NAUSEA [   ], VOMITINGS [   ], DIARRHEA [   ], CONSTIPATION [   ]  :  DYSURIA [   ], NOCTURIA [   ], INCREASED FREQUENCY [   ], DRIBLING [   ],  SKELETAL: PAINFUL JOINTS [   ], SWOLLEN JOINTS [   ], NECK ACHE [   ], LOW BACK ACHE [   ],  SKIN : ULCERS [   ], RASH [   ], ITCHING [   ]  CNS: HEAD ACHE [   ], DOUBLE VISION [   ], BLURRED VISION [   ], AMS / CONFUSION [   ], SEIZURES [   ], WEAKNESS [   ],TINGLING / NUMBNESS [   ]      PHYSICAL EXAMINATION:    GENERAL APPEARANCE: NO DISTRESS,    ANASARCA ++  HEENT:  NO PALLOR, NO  JVD,  NO   NODES, NECK SUPPLE  CVS: S1 +, S2 +,   RS: AEEB,  OCCASIONAL  RALES +,   CRACKLES+ AT LUNG BASES  ABD: SOFT, NT, NO, BS +  EXT: LEFT BKA  SKIN: WARM,   SKELETAL:  ROM ACCEPTABLE  CNS:  AAO X  2-3        RADIOLOGY :    RADIOLOGY AND READINGS REVIEWED          ASSESSMENT :     Hypervolemia    Hypertension    Adrenal insufficiency    CKD (chronic kidney disease)    Anemia    Glaucoma    Coronary artery disease    HLD (hyperlipidemia)    Peripheral vascular disease    Spinal stenosis of lumbosacral region    Hyperparathyroidism    Diabetes mellitus    Diabetic neuropathy    Contracture of hand    Osteoarthritis    Osteoporosis    Vision loss of left eye    ESRD on hemodialysis    Cataract    BPH (benign prostatic hyperplasia)    UTI (urinary tract infection)    Bladder mass    H/O hematuria    Osteoporosis    Vision loss of right eye    Depression    Chronic GERD    Osteomyelitis of vertebra    CHF (congestive heart failure)    Below knee amputation status, left    History of right cataract extraction    History of left cataract extraction    S/P arteriovenous (AV) fistula creation    H/O hematuria    H/O transurethral destruction of bladder lesion    History of excision of mass        PLAN:  HPI:  Patient is 62 year old male from [facility], walks with RW, with PMH of ESRD on HD (TTS), BKA- L w/prosthetic leg, DM, Left eye blindness, CHF, CAD, HTN, HLD, osteoporosis, bronchitis, current smoker, and anemia who presents with complaint of missed dialysis. Last HD . Pt states he often missed HD sessions.  patient states he missed this HD session due to mild pain in left leg, patient remains able to ambulate. Pt complains of right leg swelling and states he does not make any urine. Patient states he was having mild shortness of breath.  Pt denies any fever, chills, N/V/D, constipation, CP, numbness or tingling (2023 19:20)    # [8/9] MEETING HELD WITH PATIENT, BROTHER ARTEMIO @ [phone] AND SW TEAM. PATIENT W/ HISTORY OF ROUTINE NONCOMPLIANCE W/ LIFE-SUSTAINING TREATMENT [HD], EVALUATIONS AND INTERVENTIONS - COUNSELLED AT LENGTH TO REMAIN COMPLIANT. DISCUSSED THAT PROGNOSIS IS POOR/GRIM GIVEN UNDERLYING MEDICAL CONDITIONS AND GIVEN NONCOMPLIANCE. HE VERBALIZED UNDERSTANDING. REGARDING GOC - PATIENT WISHES FOR FULL CODE. PALLIATIVE CARE CONSULTED. PSYCHIATRY CONSULTED. PATIENT EXPRESSED THAT HE DOES GROW IMPATIENT DURING DIALYSIS SESSIONS AND HAS BEEN LEAVING SESSIONS SOONER THAN PRESCRIBED. IN DISCUSSION THAT RISKS OF DEFERRING COMPLETE HD - INCLUDE BUT ARE NOT LIMITED TO WORSENING CLINICAL CONDITION, ELECTROLYTE ABNORMALITIES, ARRHYTHMIA AND DEATH. HE VERBALIZED UNDERSTANDING. D/W PATIENT THAT REPEATEDLY REFUSING INTERVENTIONS AND ASSESSMENTS IS NOT IN LINE WITH HIS WISHES TO IMPROVE. PATIENT VERBALIZED UNDERSTANDING. DISCUSSED REGARDING CURRENT CLINICAL CONDITION, RECOMMENDED EVALUATIONS AND INTERVENTIONS. ALL QUESTIONS ANSWERED. TEAM HAS OFFERED PATIENT EMOTIONAL SUPPORT AND OFFERED MEDICATION FOR MOOD. OFFERED TO PRE-MEDICATE W/ ANXIOLYTIC PRIOR TO HD. PATIENT IS AGREEABLE.     DISCUSSED THAT PATIENT WILL NEED TO COMPLY WITH HD SESSIONS IN ORDER TO BE MEDICALLY OPTIMIZED FOR DISCHARGE AND FOR SAFE D/C PLANNING. TEAM DISCUSSED OPTIONS OF RETURNING TO [facility] W/ OUTPATIENT HD , IF CARE NEEDS CAN BE SAFELY MET VS. NURSING HOME/[facility] [place] . PATIENT AND BROTHER VERBALIZED UNDERSTANDING.       # ACUTE ON CHRONIC HYPOXIC RESPIRATORY FAILURE S/T PULMONARY EDEMA - S/T INCOMPLETE HD SESSIONS ; ANASARCA  # HYPERKALEMIA  # HX OF COPD + S/P RECENT MULTIFOCAL PNEUMONIA ; R/O ASPIRATION PNEUMONIA  # PULMONARY HYPERTENSION  # UNCONTROLLED HTN  # ESRD ON HD TTS - W/ HX OF NONCOMPLIANCE    - TELEMETRY  - HYDRALAZINE, METOPROLOL AND NORVASC ; PRN IV ANTIHYPERTENSIVE  - LOKELMA  - SUPPLEMENTAL O2  - PLANNED FOR HD  - NEPHROLOGY CONSULT  - PULMONOLOGY CONSULT  - ID CONSULT    - CONTINUES TO REFUSE OR PRE-MATURELY DISCONTINUE SESSIONS OF HD       - [] - OVERNIGHT RAPID RESPONSE S/T HYPOXIA - SELF-LIMITED - PATIENT IMPROVED S/P O2 SUPPLEMENT  - S/P CEFEPIME + FLAGYLL, BCX - NGTD      # RECURRENT INTRACTABLE NAUSEA/VOMITING, ? COFFEE GROUND EMESIS  # GASTROPARESIS  # HISTORY OF ESOPHAGITIS AND DUODENITIS [2021], HX OF GASTROPARESIS W/ EVIDENCE OF THICKENED ESOPHAGUS ON PREVIOUS CT SCAN   # PANCREAS W/ DOUBLE DUCT SIGN    - MONITORING HGB, PLACED ON PPI BID , CARAFATE QID  - PRN ANTIEMETICS  ; S/P DOSE OF REGLAN [WITH MINIMAL IMPROVEMENT]  - MONITORING FOR SYMPTOMS  - WHILE ON DIET PATIENT REPEATEDLY COUNSELLED TO CHEW FOOD CAUTIOUSLY AND CONSUME SMALL BITES W/ REGULAR CLEARING WITH WATER BETWEEN BITES    - ADVANCE DIET AS TOLERATED  - NOTED CT A/P    - S/P RECENT PRBC TRANSFUSION     - GI CONSULT  - SURGERY CONSULT    - [] - EPISODE OF NAUSEA/VOMITING OVER THE WEEKEND - IMPROVED    # LESS LIKELY CHOLECYSTITIS  - S/P ABX  - NOTED CT A/P  - HIDA SCAN - NEGATIVE  - SURGERY CONSULT    # ELEVATED TROPONINS - ? DEMAND ISCHEMIA    - S/P TELEMETRY  - TREND TROPONINS  - ECHO - TRACE MR, MODERATELY INCREASED LV WALL THICKNESS, NORMAL LV SYSTOLIC FUNCTION, SEVERE PULMONARY HTN  - CARDIOLOGY CONSULT    # EPISODE OF HYPOGLYCEMIA - IMPROVED  # GASTROPARESIS  # UNDERLYING DM  - SSI + FS  - COUNSELLED TO COMPLY WITH HD TO REDUCE UREMIA    - REPEATEDLY COUNSELLED TO COMPLY WITH FINGERSTICKS      # DEPRESSION  - PLACED ON ZOLOFT  - PSYCHIATRY CONSULT    # PATIENT UNDERGOING OUTPATIENT WORKUP FOR CENTRAL VENOUS STENOSIS THROUGH NEPHROLOGY TEAM  - ? s/p STENT PLACEMENT    # ANEMIA OF CKD  # HX OF PANCYTOPENIA   - TREND HGB, TRANSFUSION THRESHOLD HGB < 7; PATIENT STILL INTERMITTENTLY REFUSING BLOODWORK, COUNSELLED TO COMPLY  - TYPE AND SCREEN  - ON EPO  - DENIES RECENT HEMATEMESIS, MELENA, HEMATOCHEZIA    - S/P RECENT PRBC TRANSFUSION  - S/P PRBC TRANSFUSION     - PPI BID, CARAFATE QID    # SEVERE PROTEIN CALORIE MALNUTRITION, FAILURE TO THRIVE   -  TEMPORAL WASTING, LOSS OF MUSCLE MASS FROM SHOULDER AND HIP GIRDLE  - NUTRITIONAL SUPPLEMENT    # HLD  # PARTIALLY BLIND  # S/P LEFT BKA  # HX OF PVD  # LS SPINAL STENOSIS  # GI AND DVT PPX   Patient is a 62y old  Male who presents with a chief complaint of Missed dialysis (15 Aug 2023 09:38)    PATIENT IS SEEN AND EXAMINED IN MEDICAL FLOOR.      ALLERGIES:  No Known Drug Allergies  fish (Rash)  liver (Anaphylaxis)      Daily     Daily Weight in k.4 (15 Aug 2023 05:10)    VITALS:    Vital Signs Last 24 Hrs  T(C): 36.9 (15 Aug 2023 05:10), Max: 37.2 (14 Aug 2023 13:27)  T(F): 98.5 (15 Aug 2023 05:10), Max: 99 (14 Aug 2023 13:27)  HR: 87 (15 Aug 2023 05:10) (80 - 87)  BP: 155/62 (15 Aug 2023 05:10) (134/62 - 155/62)  BP(mean): --  RR: 18 (15 Aug 2023 05:10) (17 - 18)  SpO2: 100% (15 Aug 2023 05:10) (100% - 100%)    Parameters below as of 15 Aug 2023 05:10  Patient On (Oxygen Delivery Method): nasal cannula  O2 Flow (L/min): 2      LABS:    CBC Full  -  ( 14 Aug 2023 10:03 )  WBC Count : 3.38 K/uL  RBC Count : 2.82 M/uL  Hemoglobin : 7.7 g/dL  Hematocrit : 25.4 %  Platelet Count - Automated : 96 K/uL  Mean Cell Volume : 90.1 fl  Mean Cell Hemoglobin : 27.3 pg  Mean Cell Hemoglobin Concentration : 30.3 gm/dL  Auto Neutrophil # : x  Auto Lymphocyte # : x  Auto Monocyte # : x  Auto Eosinophil # : x  Auto Basophil # : x  Auto Neutrophil % : x  Auto Lymphocyte % : x  Auto Monocyte % : x  Auto Eosinophil % : x  Auto Basophil % : x      08-14    133<L>  |  98  |  25<H>  ----------------------------<  112<H>  4.4   |  27  |  11.70<H>    Ca    7.5<L>      14 Aug 2023 10:03  Phos  4.8     08-14  Mg     2.4     08-14      CAPILLARY BLOOD GLUCOSE      POCT Blood Glucose.: 88 mg/dL (14 Aug 2023 21:43)  POCT Blood Glucose.: 173 mg/dL (14 Aug 2023 16:42)  POCT Blood Glucose.: 111 mg/dL (14 Aug 2023 12:02)          Creatinine Trend: 11.70<--, 12.90<--, 11.70<--, 17.50<--, 17.40<--, 16.90<--  I&O's Summary    14 Aug 2023 07:01  -  15 Aug 2023 07:00  --------------------------------------------------------  IN: 0 mL / OUT: 4 mL / NET: -4 mL            .Blood Blood-Peripheral  - @ 14:07   No Growth Final  --  --          MEDICATIONS:    MEDICATIONS  (STANDING):  albuterol    90 MICROgram(s) HFA Inhaler 2 Puff(s) Inhalation every 6 hours  amLODIPine   Tablet 10 milliGRAM(s) Oral daily  aspirin enteric coated 81 milliGRAM(s) Oral daily  atorvastatin 40 milliGRAM(s) Oral at bedtime  budesonide 160 MICROgram(s)/formoterol 4.5 MICROgram(s) Inhaler 2 Puff(s) Inhalation two times a day  chlorhexidine 2% Cloths 1 Application(s) Topical daily  dextrose 5%. 1000 milliLiter(s) (50 mL/Hr) IV Continuous <Continuous>  dextrose 5%. 1000 milliLiter(s) (100 mL/Hr) IV Continuous <Continuous>  dextrose 50% Injectable 25 Gram(s) IV Push once  dextrose 50% Injectable 12.5 Gram(s) IV Push once  dextrose 50% Injectable 25 Gram(s) IV Push once  epoetin beverley (PROCRIT) Injectable 62542 Unit(s) IV Push <User Schedule>  epoetin beverley-epbx (RETACRIT) Injectable 20431 Unit(s) IV Push <User Schedule>  folic acid 1 milliGRAM(s) Oral daily  furosemide    Tablet 40 milliGRAM(s) Oral daily  glucagon  Injectable 1 milliGRAM(s) IntraMuscular once  hydrALAZINE 25 milliGRAM(s) Oral three times a day  latanoprost 0.005% Ophthalmic Solution 1 Drop(s) Both EYES at bedtime  levothyroxine 25 MICROGram(s) Oral daily  lidocaine   4% Patch 2 Patch Transdermal daily  melatonin 3 milliGRAM(s) Oral at bedtime  metoprolol succinate ER 50 milliGRAM(s) Oral daily  pantoprazole    Tablet 40 milliGRAM(s) Oral before breakfast  sertraline 200 milliGRAM(s) Oral daily  sevelamer carbonate 800 milliGRAM(s) Oral three times a day with meals  sucralfate 1 Gram(s) Oral four times a day      MEDICATIONS  (PRN):  acetaminophen     Tablet .. 975 milliGRAM(s) Oral every 8 hours PRN Moderate Pain (4 - 6)  dextrose Oral Gel 15 Gram(s) Oral once PRN Blood Glucose LESS THAN 70 milliGRAM(s)/deciliter  LORazepam     Tablet 2 milliGRAM(s) Oral every other day PRN Anxiety  ondansetron   Disintegrating Tablet 4 milliGRAM(s) Oral two times a day PRN Nausea and/or Vomiting  ondansetron Injectable 4 milliGRAM(s) IV Push every 6 hours PRN Nausea and/or Vomiting      REVIEW OF SYSTEMS:                           ALL ROS DONE [ X   ]    CONSTITUTIONAL:  LETHARGIC [   ], FEVER [   ], UNRESPONSIVE [   ]  CVS:  CP  [   ], SOB, [   ], PALPITATIONS [   ], DIZZYNESS [   ]  RS: COUGH [   ], SPUTUM [   ]  GI: ABDOMINAL PAIN [   ], NAUSEA [   ], VOMITINGS [   ], DIARRHEA [   ], CONSTIPATION [   ]  :  DYSURIA [   ], NOCTURIA [   ], INCREASED FREQUENCY [   ], DRIBLING [   ],  SKELETAL: PAINFUL JOINTS [   ], SWOLLEN JOINTS [   ], NECK ACHE [   ], LOW BACK ACHE [   ],  SKIN : ULCERS [   ], RASH [   ], ITCHING [   ]  CNS: HEAD ACHE [   ], DOUBLE VISION [   ], BLURRED VISION [   ], AMS / CONFUSION [   ], SEIZURES [   ], WEAKNESS [   ],TINGLING / NUMBNESS [   ]      PHYSICAL EXAMINATION:    GENERAL APPEARANCE: NO DISTRESS,    ANASARCA ++  HEENT:  NO PALLOR, NO  JVD,  NO   NODES, NECK SUPPLE  CVS: S1 +, S2 +,   RS: AEEB,  OCCASIONAL  RALES +,   CRACKLES+ AT LUNG BASES  ABD: SOFT, NT, NO, BS +  EXT: LEFT BKA  SKIN: WARM,   SKELETAL:  ROM ACCEPTABLE  CNS:  AAO X  2-3        RADIOLOGY :    RADIOLOGY AND READINGS REVIEWED          ASSESSMENT :     Hypervolemia    Hypertension    Adrenal insufficiency    CKD (chronic kidney disease)    Anemia    Glaucoma    Coronary artery disease    HLD (hyperlipidemia)    Peripheral vascular disease    Spinal stenosis of lumbosacral region    Hyperparathyroidism    Diabetes mellitus    Diabetic neuropathy    Contracture of hand    Osteoarthritis    Osteoporosis    Vision loss of left eye    ESRD on hemodialysis    Cataract    BPH (benign prostatic hyperplasia)    UTI (urinary tract infection)    Bladder mass    H/O hematuria    Osteoporosis    Vision loss of right eye    Depression    Chronic GERD    Osteomyelitis of vertebra    CHF (congestive heart failure)    Below knee amputation status, left    History of right cataract extraction    History of left cataract extraction    S/P arteriovenous (AV) fistula creation    H/O hematuria    H/O transurethral destruction of bladder lesion    History of excision of mass        PLAN:  HPI:  Patient is 62 year old male from [facility], walks with RW, with PMH of ESRD on HD (TTS), BKA- L w/prosthetic leg, DM, Left eye blindness, CHF, CAD, HTN, HLD, osteoporosis, bronchitis, current smoker, and anemia who presents with complaint of missed dialysis. Last HD . Pt states he often missed HD sessions.  patient states he missed this HD session due to mild pain in left leg, patient remains able to ambulate. Pt complains of right leg swelling and states he does not make any urine. Patient states he was having mild shortness of breath.  Pt denies any fever, chills, N/V/D, constipation, CP, numbness or tingling (2023 19:20)    # [8/9] MEETING HELD WITH PATIENT, BROTHER ARTEMIO @ [phone] AND SW TEAM. PATIENT W/ HISTORY OF ROUTINE NONCOMPLIANCE W/ LIFE-SUSTAINING TREATMENT [HD], EVALUATIONS AND INTERVENTIONS - COUNSELLED AT LENGTH TO REMAIN COMPLIANT. DISCUSSED THAT PROGNOSIS IS POOR/GRIM GIVEN UNDERLYING MEDICAL CONDITIONS AND GIVEN NONCOMPLIANCE. HE VERBALIZED UNDERSTANDING. REGARDING GOC - PATIENT WISHES FOR FULL CODE. PALLIATIVE CARE CONSULTED. PSYCHIATRY CONSULTED. PATIENT EXPRESSED THAT HE DOES GROW IMPATIENT DURING DIALYSIS SESSIONS AND HAS BEEN LEAVING SESSIONS SOONER THAN PRESCRIBED. IN DISCUSSION THAT RISKS OF DEFERRING COMPLETE HD - INCLUDE BUT ARE NOT LIMITED TO WORSENING CLINICAL CONDITION, ELECTROLYTE ABNORMALITIES, ARRHYTHMIA AND DEATH. HE VERBALIZED UNDERSTANDING. D/W PATIENT THAT REPEATEDLY REFUSING INTERVENTIONS AND ASSESSMENTS IS NOT IN LINE WITH HIS WISHES TO IMPROVE. PATIENT VERBALIZED UNDERSTANDING. DISCUSSED REGARDING CURRENT CLINICAL CONDITION, RECOMMENDED EVALUATIONS AND INTERVENTIONS. ALL QUESTIONS ANSWERED. TEAM HAS OFFERED PATIENT EMOTIONAL SUPPORT AND OFFERED MEDICATION FOR MOOD. OFFERED TO PRE-MEDICATE W/ ANXIOLYTIC PRIOR TO HD. PATIENT IS AGREEABLE.     DISCUSSED THAT PATIENT WILL NEED TO COMPLY WITH HD SESSIONS IN ORDER TO BE MEDICALLY OPTIMIZED FOR DISCHARGE AND FOR SAFE D/C PLANNING. TEAM DISCUSSED OPTIONS OF RETURNING TO [facility] W/ OUTPATIENT HD , IF CARE NEEDS CAN BE SAFELY MET VS. NURSING HOME/[facility] . PATIENT AND BROTHER VERBALIZED UNDERSTANDING.       # ACUTE ON CHRONIC HYPOXIC RESPIRATORY FAILURE S/T PULMONARY EDEMA - S/T INCOMPLETE HD SESSIONS ; ANASARCA  # HYPERKALEMIA  # HX OF COPD + S/P RECENT MULTIFOCAL PNEUMONIA ; R/O ASPIRATION PNEUMONIA  # PULMONARY HYPERTENSION  # UNCONTROLLED HTN  # ESRD ON HD TTS - W/ HX OF NONCOMPLIANCE    - TELEMETRY  - HYDRALAZINE, METOPROLOL AND NORVASC ; PRN IV ANTIHYPERTENSIVE  - LOKELMA  - SUPPLEMENTAL O2  - PLANNED FOR HD  - NEPHROLOGY CONSULT  - PULMONOLOGY CONSULT  - ID CONSULT    - CONTINUES TO REFUSE OR PRE-MATURELY DISCONTINUE SESSIONS OF HD       - [] - OVERNIGHT RAPID RESPONSE S/T HYPOXIA - SELF-LIMITED - PATIENT IMPROVED S/P O2 SUPPLEMENT  - S/P CEFEPIME + FLAGYLL, BCX - NGTD      # RECURRENT INTRACTABLE NAUSEA/VOMITING, ? COFFEE GROUND EMESIS  # GASTROPARESIS  # HISTORY OF ESOPHAGITIS AND DUODENITIS [2021], HX OF GASTROPARESIS W/ EVIDENCE OF THICKENED ESOPHAGUS ON PREVIOUS CT SCAN   # PANCREAS W/ DOUBLE DUCT SIGN    - MONITORING HGB, PLACED ON PPI BID , CARAFATE QID  - PRN ANTIEMETICS  ; S/P DOSE OF REGLAN [WITH MINIMAL IMPROVEMENT]  - MONITORING FOR SYMPTOMS  - WHILE ON DIET PATIENT REPEATEDLY COUNSELLED TO CHEW FOOD CAUTIOUSLY AND CONSUME SMALL BITES W/ REGULAR CLEARING WITH WATER BETWEEN BITES    - ADVANCE DIET AS TOLERATED  - NOTED CT A/P    - S/P RECENT PRBC TRANSFUSION     - GI CONSULT  - SURGERY CONSULT    - [] - EPISODE OF NAUSEA/VOMITING OVER THE WEEKEND - IMPROVED    # LESS LIKELY CHOLECYSTITIS  - S/P ABX  - NOTED CT A/P  - HIDA SCAN - NEGATIVE  - SURGERY CONSULT    # ELEVATED TROPONINS - ? DEMAND ISCHEMIA    - S/P TELEMETRY  - TREND TROPONINS  - ECHO - TRACE MR, MODERATELY INCREASED LV WALL THICKNESS, NORMAL LV SYSTOLIC FUNCTION, SEVERE PULMONARY HTN  - CARDIOLOGY CONSULT    # EPISODE OF HYPOGLYCEMIA - IMPROVED  # GASTROPARESIS  # UNDERLYING DM  - SSI + FS  - COUNSELLED TO COMPLY WITH HD TO REDUCE UREMIA    - REPEATEDLY COUNSELLED TO COMPLY WITH FINGERSTICKS      # DEPRESSION  - PLACED ON ZOLOFT  - PSYCHIATRY CONSULT    # PATIENT UNDERGOING OUTPATIENT WORKUP FOR CENTRAL VENOUS STENOSIS THROUGH NEPHROLOGY TEAM  - ? s/p STENT PLACEMENT    # ANEMIA OF CKD  # HX OF PANCYTOPENIA   - TREND HGB, TRANSFUSION THRESHOLD HGB < 7; PATIENT STILL INTERMITTENTLY REFUSING BLOODWORK, COUNSELLED TO COMPLY  - TYPE AND SCREEN  - ON EPO  - DENIES RECENT HEMATEMESIS, MELENA, HEMATOCHEZIA    - S/P RECENT PRBC TRANSFUSION  - S/P PRBC TRANSFUSION     - PPI BID, CARAFATE QID    # SEVERE PROTEIN CALORIE MALNUTRITION, FAILURE TO THRIVE   -  TEMPORAL WASTING, LOSS OF MUSCLE MASS FROM SHOULDER AND HIP GIRDLE  - NUTRITIONAL SUPPLEMENT    # HLD  # PARTIALLY BLIND  # S/P LEFT BKA  # HX OF PVD  # LS SPINAL STENOSIS  # GI AND DVT PPX

## 2023-08-15 NOTE — PROGRESS NOTE ADULT - PROBLEM SELECTOR PLAN 3
H/o ESRD on HD (T/Th/Sat)  - Pt refused HD on 8/5.  Completed 8 minutes on HD on 8/8.  Completed 2.5h HD on 8/9.    - Last HD 8/11  - if returning to [facility] will need to complete 4 HD sessions prior to d/c has complete 2 full sessions and 1 incomplete  - will premedicate prior to HD with Ativan 2mg PO  - Nephro-Dr. Mosher, following H/o ESRD on HD (T/Th/Sat)  - Pt refused HD on 8/5.  Completed 8 minutes on HD on 8/8.  Completed 2.5h HD on 8/9.    - Last HD 8/11  - if returning to [facility] will need to complete 4 HD sessions prior to d/c has complete 2 full sessions and 1 incomplete  - will premedicate prior to HD with Ativan 2mg PO, PRN order placed  - Nephro-Dr. Mosher, following H/o ESRD on HD (T/Th/Sat)  - Pt refused HD on 8/5.  Completed 8 minutes on HD on 8/8.  Completed 2.5h HD on 8/9.    - Last HD 8/11  - if returning to [facility] will need to complete 4 HD sessions prior to d/c has complete 2 full sessions and 1 incomplete  - will premedicate prior to HD with Ativan 2mg PO, PRN order placed  -f/u hepatitis panel, sent and pending  - Nephro-Dr. Mosher, following

## 2023-08-15 NOTE — PROGRESS NOTE ADULT - SUBJECTIVE AND OBJECTIVE BOX
NP Note discussed with  Primary Attending    Patient is a 62y old  Male who presents with a chief complaint of Missed dialysis (14 Aug 2023 11:05)      INTERVAL HPI/OVERNIGHT EVENTS: no acute events overnight     MEDICATIONS  (STANDING):  albuterol    90 MICROgram(s) HFA Inhaler 2 Puff(s) Inhalation every 6 hours  amLODIPine   Tablet 10 milliGRAM(s) Oral daily  aspirin enteric coated 81 milliGRAM(s) Oral daily  atorvastatin 40 milliGRAM(s) Oral at bedtime  budesonide 160 MICROgram(s)/formoterol 4.5 MICROgram(s) Inhaler 2 Puff(s) Inhalation two times a day  chlorhexidine 2% Cloths 1 Application(s) Topical daily  dextrose 5%. 1000 milliLiter(s) (50 mL/Hr) IV Continuous <Continuous>  dextrose 5%. 1000 milliLiter(s) (100 mL/Hr) IV Continuous <Continuous>  dextrose 50% Injectable 25 Gram(s) IV Push once  dextrose 50% Injectable 12.5 Gram(s) IV Push once  dextrose 50% Injectable 25 Gram(s) IV Push once  epoetin beverley (PROCRIT) Injectable 82574 Unit(s) IV Push <User Schedule>  epoetin beverley-epbx (RETACRIT) Injectable 55387 Unit(s) IV Push <User Schedule>  folic acid 1 milliGRAM(s) Oral daily  furosemide    Tablet 40 milliGRAM(s) Oral daily  glucagon  Injectable 1 milliGRAM(s) IntraMuscular once  hydrALAZINE 25 milliGRAM(s) Oral three times a day  latanoprost 0.005% Ophthalmic Solution 1 Drop(s) Both EYES at bedtime  levothyroxine 25 MICROGram(s) Oral daily  lidocaine   4% Patch 2 Patch Transdermal daily  melatonin 3 milliGRAM(s) Oral at bedtime  metoprolol succinate ER 50 milliGRAM(s) Oral daily  pantoprazole    Tablet 40 milliGRAM(s) Oral before breakfast  sertraline 200 milliGRAM(s) Oral daily  sevelamer carbonate 800 milliGRAM(s) Oral three times a day with meals  sucralfate 1 Gram(s) Oral four times a day    MEDICATIONS  (PRN):  acetaminophen     Tablet .. 975 milliGRAM(s) Oral every 8 hours PRN Moderate Pain (4 - 6)  dextrose Oral Gel 15 Gram(s) Oral once PRN Blood Glucose LESS THAN 70 milliGRAM(s)/deciliter  LORazepam     Tablet 2 milliGRAM(s) Oral every other day PRN Anxiety  ondansetron   Disintegrating Tablet 4 milliGRAM(s) Oral two times a day PRN Nausea and/or Vomiting  ondansetron Injectable 4 milliGRAM(s) IV Push every 6 hours PRN Nausea and/or Vomiting      __________________________________________________  REVIEW OF SYSTEMS:    CONSTITUTIONAL: No fever,   EYES: no acute visual disturbances  NECK: No pain or stiffness  RESPIRATORY: No cough; No shortness of breath  CARDIOVASCULAR: No chest pain, no palpitations  GASTROINTESTINAL: No pain. No nausea or vomiting; No diarrhea   NEUROLOGICAL: No headache or numbness, no tremors  MUSCULOSKELETAL: No joint pain, no muscle pain  GENITOURINARY: no dysuria, no frequency, no hesitancy  PSYCHIATRY: no depression , no anxiety  ALL OTHER  ROS negative        Vital Signs Last 24 Hrs  T(C): 36.9 (15 Aug 2023 05:10), Max: 37.2 (14 Aug 2023 13:27)  T(F): 98.5 (15 Aug 2023 05:10), Max: 99 (14 Aug 2023 13:27)  HR: 87 (15 Aug 2023 05:10) (80 - 88)  BP: 155/62 (15 Aug 2023 05:10) (134/62 - 155/62)  BP(mean): --  RR: 18 (15 Aug 2023 05:10) (17 - 18)  SpO2: 100% (15 Aug 2023 05:10) (100% - 100%)    Parameters below as of 15 Aug 2023 05:10  Patient On (Oxygen Delivery Method): nasal cannula  O2 Flow (L/min): 2      ________________________________________________  PHYSICAL EXAM:  GENERAL: NAD  HEENT: Normocephalic   NECK : supple  CHEST/LUNG: Clear to auscultation bilaterally   HEART: S1 S2  regular  ABDOMEN: Soft, Nontender, Nondistended; Bowel sounds present  EXTREMITIES: no edema  SKIN: warm and dry; no rash  NERVOUS SYSTEM:  Awake and alert; Oriented  to place, person and time    _________________________________________________  LABS:                        7.7    3.38  )-----------( 96       ( 14 Aug 2023 10:03 )             25.4     08-14    133<L>  |  98  |  25<H>  ----------------------------<  112<H>  4.4   |  27  |  11.70<H>    Ca    7.5<L>      14 Aug 2023 10:03  Phos  4.8     08-14  Mg     2.4     08-14        Urinalysis Basic - ( 14 Aug 2023 10:03 )    Color: x / Appearance: x / SG: x / pH: x  Gluc: 112 mg/dL / Ketone: x  / Bili: x / Urobili: x   Blood: x / Protein: x / Nitrite: x   Leuk Esterase: x / RBC: x / WBC x   Sq Epi: x / Non Sq Epi: x / Bacteria: x      CAPILLARY BLOOD GLUCOSE      POCT Blood Glucose.: 88 mg/dL (14 Aug 2023 21:43)  POCT Blood Glucose.: 173 mg/dL (14 Aug 2023 16:42)  POCT Blood Glucose.: 111 mg/dL (14 Aug 2023 12:02)        RADIOLOGY & ADDITIONAL TESTS:    < from: NM Hepatobiliary Imaging w/ RX (07.31.23 @ 10:44) >  INTERPRETATION:  CLINICAL INFORMATION: 62-year-old man with RIGHT UPPER   quadrant pain referred for evaluation of acute cholecystitis.    DURATION of DYNAMIC SERIES: 45 minutes  RADIOPHARMACEUTICAL: 3.2 mCi Tc-99m-Mebrofenin, I.V.    TECHNIQUE: Dynamic imaging of the anterior abdomen was performed   following radiopharmaceutical injection. Patient declined static images.    COMPARISON: None    OTHER STUDIES USED FOR CORRELATION: CT abdomen/pelvis 7/30/2023    FINDINGS: There is prompt, homogeneous uptake of radiopharmaceutical by   the hepatocytes. Activity is seen in the gallbladder at approximately 15   minutes. Patient declined further imaging before bowel was visualized.   There is fair clearance of activity from the liver by the end of the   obtained images..    IMPRESSION: Patient declined to complete the full exam and bowel was not   visualized on the available images. Otherwise unremarkable exam with no   evidence of acute cholecystitis or biliary obstruction.        --- End of Report ---    < end of copied text >      < from: CT Abdomen and Pelvis No Cont (07.30.23 @ 13:35) >  FINDINGS: The examination is limited by lack of IV contrast.  LOWER CHEST: Small bilateral pleural effusions. Small bilateral   atelectasis. Nonspecific groundglass densities in the right lower lung   zone; clinical correlation with pneumonia is suggested. Cardiomegaly and   mild pericardial effusion, unchanged.    LIVER: Within normal limits.  BILE DUCTS: Dilated common bile duct again noted.  GALLBLADDER: Small gallstones. Nonspecific trace pericholecystic fluid.  SPLEEN: Within normal limits.  PANCREAS: Dilated main pancreaticduct is again noted.  ADRENALS: The right adrenal appears unremarkable. Nonspecific left   adrenal thickening.  KIDNEYS/URETERS: Within normal limits except for a few small hypodense   lesions in the kidneys bilaterally, representing probable cysts.    BLADDER: Underdistended.  REPRODUCTIVE ORGANS: The prostate and seminal vesicles appear grossly   unremarkable.    BOWEL: No bowel obstruction. Appendix unremarkable. Nonspecific mild   distal esophageal mural thickening.  PERITONEUM: Small ascites. No free air.  VESSELS: Calcified atherosclerotic disease.  RETROPERITONEUM/LYMPH NODES: No lymphadenopathy.  ABDOMINAL WALL: Anasarca again noted.  BONES: No acute CT findings.    IMPRESSION: Small gallstones. Nonspecific trace pericholecystic fluid..   If there is a clinical suspicion for acute cholecystitis, gallbladder   ultrasound/HIDA scan may be pursued for further evaluation.    Stable dilated common bile duct and main pancreatic duct. Please see   previous MRCP dated 2/23/2023.    No bowel obstruction. Appendix unremarkable. Nonspecific mild distal   esophageal mural thickening.    Small ascites.    Anasarca.    Small bilateral pleural effusions. Small bilateral atelectasis.   Nonspecific groundglass densities in the right lower lungzone; clinical   correlation with pneumonia is suggested.    Cardiomegaly and mild pericardial effusion, unchanged.    --- End of Report ---    < end of copied text >    Imaging Personally Reviewed:  YES/NO    Consultant(s) Notes Reviewed:   YES/ No    Care Discussed with Consultants :   < from: CT Abdomen and Pelvis No Cont (07.30.23 @ 13:35) >  FINDINGS: The examination is limited by lack of IV contrast.  LOWER CHEST: Small bilateral pleural effusions. Small bilateral   atelectasis. Nonspecific groundglass densities in the right lower lung   zone; clinical correlation with pneumonia is suggested. Cardiomegaly and   mild pericardial effusion, unchanged.    LIVER: Within normal limits.  BILE DUCTS: Dilated common bile duct again noted.  GALLBLADDER: Small gallstones. Nonspecific trace pericholecystic fluid.  SPLEEN: Within normal limits.  PANCREAS: Dilated main pancreaticduct is again noted.  ADRENALS: The right adrenal appears unremarkable. Nonspecific left   adrenal thickening.  KIDNEYS/URETERS: Within normal limits except for a few small hypodense   lesions in the kidneys bilaterally, representing probable cysts.    BLADDER: Underdistended.  REPRODUCTIVE ORGANS: The prostate and seminal vesicles appear grossly   unremarkable.    BOWEL: No bowel obstruction. Appendix unremarkable. Nonspecific mild   distal esophageal mural thickening.  PERITONEUM: Small ascites. No free air.  VESSELS: Calcified atherosclerotic disease.  RETROPERITONEUM/LYMPH NODES: No lymphadenopathy.  ABDOMINAL WALL: Anasarca again noted.  BONES: No acute CT findings.    IMPRESSION: Small gallstones. Nonspecific trace pericholecystic fluid..   If there is a clinical suspicion for acute cholecystitis, gallbladder   ultrasound/HIDA scan may be pursued for further evaluation.    Stable dilated common bile duct and main pancreatic duct. Please see   previous MRCP dated 2/23/2023.    No bowel obstruction. Appendix unremarkable. Nonspecific mild distal   esophageal mural thickening.    Small ascites.    Anasarca.    Small bilateral pleural effusions. Small bilateral atelectasis.   Nonspecific groundglass densities in the right lower lungzone; clinical   correlation with pneumonia is suggested.    Cardiomegaly and mild pericardial effusion, unchanged.    --- End of Report ---    < end of copied text >    Plan of care was discussed with patient and /or primary care giver; all questions and concerns were addressed and care was aligned with patient's wishes.     NP Note discussed with  Primary Attending    Patient is a 62y old  Male who presents with a chief complaint of Missed dialysis (14 Aug 2023 11:05)      INTERVAL HPI/OVERNIGHT EVENTS: patient with some diarrhea after dialysis yesterday, does not endorse diarrhea this AM    MEDICATIONS  (STANDING):  albuterol    90 MICROgram(s) HFA Inhaler 2 Puff(s) Inhalation every 6 hours  amLODIPine   Tablet 10 milliGRAM(s) Oral daily  aspirin enteric coated 81 milliGRAM(s) Oral daily  atorvastatin 40 milliGRAM(s) Oral at bedtime  budesonide 160 MICROgram(s)/formoterol 4.5 MICROgram(s) Inhaler 2 Puff(s) Inhalation two times a day  chlorhexidine 2% Cloths 1 Application(s) Topical daily  dextrose 5%. 1000 milliLiter(s) (50 mL/Hr) IV Continuous <Continuous>  dextrose 5%. 1000 milliLiter(s) (100 mL/Hr) IV Continuous <Continuous>  dextrose 50% Injectable 25 Gram(s) IV Push once  dextrose 50% Injectable 12.5 Gram(s) IV Push once  dextrose 50% Injectable 25 Gram(s) IV Push once  epoetin beverley (PROCRIT) Injectable 21408 Unit(s) IV Push <User Schedule>  epoetin beverley-epbx (RETACRIT) Injectable 80700 Unit(s) IV Push <User Schedule>  folic acid 1 milliGRAM(s) Oral daily  furosemide    Tablet 40 milliGRAM(s) Oral daily  glucagon  Injectable 1 milliGRAM(s) IntraMuscular once  hydrALAZINE 25 milliGRAM(s) Oral three times a day  latanoprost 0.005% Ophthalmic Solution 1 Drop(s) Both EYES at bedtime  levothyroxine 25 MICROGram(s) Oral daily  lidocaine   4% Patch 2 Patch Transdermal daily  melatonin 3 milliGRAM(s) Oral at bedtime  metoprolol succinate ER 50 milliGRAM(s) Oral daily  pantoprazole    Tablet 40 milliGRAM(s) Oral before breakfast  sertraline 200 milliGRAM(s) Oral daily  sevelamer carbonate 800 milliGRAM(s) Oral three times a day with meals  sucralfate 1 Gram(s) Oral four times a day    MEDICATIONS  (PRN):  acetaminophen     Tablet .. 975 milliGRAM(s) Oral every 8 hours PRN Moderate Pain (4 - 6)  dextrose Oral Gel 15 Gram(s) Oral once PRN Blood Glucose LESS THAN 70 milliGRAM(s)/deciliter  LORazepam     Tablet 2 milliGRAM(s) Oral every other day PRN Anxiety  ondansetron   Disintegrating Tablet 4 milliGRAM(s) Oral two times a day PRN Nausea and/or Vomiting  ondansetron Injectable 4 milliGRAM(s) IV Push every 6 hours PRN Nausea and/or Vomiting      __________________________________________________  REVIEW OF SYSTEMS:    CONSTITUTIONAL: No fever,   EYES: no acute visual disturbances  NECK: No pain or stiffness  RESPIRATORY: No cough; No shortness of breath  CARDIOVASCULAR: No chest pain, no palpitations  GASTROINTESTINAL: No pain. No nausea or vomiting; No diarrhea   NEUROLOGICAL: No headache or numbness, no tremors  MUSCULOSKELETAL: No joint pain, no muscle pain  GENITOURINARY: no dysuria, no frequency, no hesitancy  PSYCHIATRY: no depression , no anxiety  ALL OTHER  ROS negative        Vital Signs Last 24 Hrs  T(C): 36.9 (15 Aug 2023 05:10), Max: 37.2 (14 Aug 2023 13:27)  T(F): 98.5 (15 Aug 2023 05:10), Max: 99 (14 Aug 2023 13:27)  HR: 87 (15 Aug 2023 05:10) (80 - 88)  BP: 155/62 (15 Aug 2023 05:10) (134/62 - 155/62)  BP(mean): --  RR: 18 (15 Aug 2023 05:10) (17 - 18)  SpO2: 100% (15 Aug 2023 05:10) (100% - 100%)    Parameters below as of 15 Aug 2023 05:10  Patient On (Oxygen Delivery Method): nasal cannula  O2 Flow (L/min): 2      ________________________________________________  PHYSICAL EXAM:  GENERAL: NAD  HEENT: Normocephalic   NECK : supple  CHEST/LUNG: Clear to auscultation bilaterally   HEART: S1 S2  regular  ABDOMEN: Soft, Nontender, Nondistended; Bowel sounds present  EXTREMITIES: no edema; L BKA, R AVF  SKIN: warm and dry; no rash  NERVOUS SYSTEM:  Awake and alert; Oriented  to place, person and time    _________________________________________________  LABS:                        7.7    3.38  )-----------( 96       ( 14 Aug 2023 10:03 )             25.4     08-14    133<L>  |  98  |  25<H>  ----------------------------<  112<H>  4.4   |  27  |  11.70<H>    Ca    7.5<L>      14 Aug 2023 10:03  Phos  4.8     08-14  Mg     2.4     08-14        Urinalysis Basic - ( 14 Aug 2023 10:03 )    Color: x / Appearance: x / SG: x / pH: x  Gluc: 112 mg/dL / Ketone: x  / Bili: x / Urobili: x   Blood: x / Protein: x / Nitrite: x   Leuk Esterase: x / RBC: x / WBC x   Sq Epi: x / Non Sq Epi: x / Bacteria: x      CAPILLARY BLOOD GLUCOSE      POCT Blood Glucose.: 88 mg/dL (14 Aug 2023 21:43)  POCT Blood Glucose.: 173 mg/dL (14 Aug 2023 16:42)  POCT Blood Glucose.: 111 mg/dL (14 Aug 2023 12:02)        RADIOLOGY & ADDITIONAL TESTS:    < from: NM Hepatobiliary Imaging w/ RX (07.31.23 @ 10:44) >  INTERPRETATION:  CLINICAL INFORMATION: 62-year-old man with RIGHT UPPER   quadrant pain referred for evaluation of acute cholecystitis.    DURATION of DYNAMIC SERIES: 45 minutes  RADIOPHARMACEUTICAL: 3.2 mCi Tc-99m-Mebrofenin, I.V.    TECHNIQUE: Dynamic imaging of the anterior abdomen was performed   following radiopharmaceutical injection. Patient declined static images.    COMPARISON: None    OTHER STUDIES USED FOR CORRELATION: CT abdomen/pelvis 7/30/2023    FINDINGS: There is prompt, homogeneous uptake of radiopharmaceutical by   the hepatocytes. Activity is seen in the gallbladder at approximately 15   minutes. Patient declined further imaging before bowel was visualized.   There is fair clearance of activity from the liver by the end of the   obtained images..    IMPRESSION: Patient declined to complete the full exam and bowel was not   visualized on the available images. Otherwise unremarkable exam with no   evidence of acute cholecystitis or biliary obstruction.        --- End of Report ---    < end of copied text >      < from: CT Abdomen and Pelvis No Cont (07.30.23 @ 13:35) >  FINDINGS: The examination is limited by lack of IV contrast.  LOWER CHEST: Small bilateral pleural effusions. Small bilateral   atelectasis. Nonspecific groundglass densities in the right lower lung   zone; clinical correlation with pneumonia is suggested. Cardiomegaly and   mild pericardial effusion, unchanged.    LIVER: Within normal limits.  BILE DUCTS: Dilated common bile duct again noted.  GALLBLADDER: Small gallstones. Nonspecific trace pericholecystic fluid.  SPLEEN: Within normal limits.  PANCREAS: Dilated main pancreaticduct is again noted.  ADRENALS: The right adrenal appears unremarkable. Nonspecific left   adrenal thickening.  KIDNEYS/URETERS: Within normal limits except for a few small hypodense   lesions in the kidneys bilaterally, representing probable cysts.    BLADDER: Underdistended.  REPRODUCTIVE ORGANS: The prostate and seminal vesicles appear grossly   unremarkable.    BOWEL: No bowel obstruction. Appendix unremarkable. Nonspecific mild   distal esophageal mural thickening.  PERITONEUM: Small ascites. No free air.  VESSELS: Calcified atherosclerotic disease.  RETROPERITONEUM/LYMPH NODES: No lymphadenopathy.  ABDOMINAL WALL: Anasarca again noted.  BONES: No acute CT findings.    IMPRESSION: Small gallstones. Nonspecific trace pericholecystic fluid..   If there is a clinical suspicion for acute cholecystitis, gallbladder   ultrasound/HIDA scan may be pursued for further evaluation.    Stable dilated common bile duct and main pancreatic duct. Please see   previous MRCP dated 2/23/2023.    No bowel obstruction. Appendix unremarkable. Nonspecific mild distal   esophageal mural thickening.    Small ascites.    Anasarca.    Small bilateral pleural effusions. Small bilateral atelectasis.   Nonspecific groundglass densities in the right lower lungzone; clinical   correlation with pneumonia is suggested.    Cardiomegaly and mild pericardial effusion, unchanged.    --- End of Report ---    < end of copied text >    Imaging Personally Reviewed:  YES    Consultant(s) Notes Reviewed:   YES    Care Discussed with Consultants :   < from: CT Abdomen and Pelvis No Cont (07.30.23 @ 13:35) >  FINDINGS: The examination is limited by lack of IV contrast.  LOWER CHEST: Small bilateral pleural effusions. Small bilateral   atelectasis. Nonspecific groundglass densities in the right lower lung   zone; clinical correlation with pneumonia is suggested. Cardiomegaly and   mild pericardial effusion, unchanged.    LIVER: Within normal limits.  BILE DUCTS: Dilated common bile duct again noted.  GALLBLADDER: Small gallstones. Nonspecific trace pericholecystic fluid.  SPLEEN: Within normal limits.  PANCREAS: Dilated main pancreaticduct is again noted.  ADRENALS: The right adrenal appears unremarkable. Nonspecific left   adrenal thickening.  KIDNEYS/URETERS: Within normal limits except for a few small hypodense   lesions in the kidneys bilaterally, representing probable cysts.    BLADDER: Underdistended.  REPRODUCTIVE ORGANS: The prostate and seminal vesicles appear grossly   unremarkable.    BOWEL: No bowel obstruction. Appendix unremarkable. Nonspecific mild   distal esophageal mural thickening.  PERITONEUM: Small ascites. No free air.  VESSELS: Calcified atherosclerotic disease.  RETROPERITONEUM/LYMPH NODES: No lymphadenopathy.  ABDOMINAL WALL: Anasarca again noted.  BONES: No acute CT findings.    IMPRESSION: Small gallstones. Nonspecific trace pericholecystic fluid..   If there is a clinical suspicion for acute cholecystitis, gallbladder   ultrasound/HIDA scan may be pursued for further evaluation.    Stable dilated common bile duct and main pancreatic duct. Please see   previous MRCP dated 2/23/2023.    No bowel obstruction. Appendix unremarkable. Nonspecific mild distal   esophageal mural thickening.    Small ascites.    Anasarca.    Small bilateral pleural effusions. Small bilateral atelectasis.   Nonspecific groundglass densities in the right lower lungzone; clinical   correlation with pneumonia is suggested.    Cardiomegaly and mild pericardial effusion, unchanged.    --- End of Report ---    < end of copied text >    Plan of care was discussed with patient and /or primary care giver; all questions and concerns were addressed and care was aligned with patient's wishes.

## 2023-08-15 NOTE — PROGRESS NOTE ADULT - ASSESSMENT
# ESRD.  HD in AM   UF as tolerated  D/W pt compliance with HD as prescribed.  pre-HD Ativan recommended.  # anemia of CKD. epogen on HD. transfuse for HBG <7  #CKDMBD. phos at goal   # HTN. blood pressure at goal

## 2023-08-15 NOTE — PROGRESS NOTE ADULT - PROBLEM SELECTOR PLAN 1
- Most likey Acute on chronic combined HF d/t missed HD resulting in volume overload & RLL HAP   - CT A/P noted for RLL PNA.  S/p HAP treatment.  Completed tx for HAP s/p Cefepime and Flagyl.  - S/p Repeat CXR showed worsening HF  - C/w Oxygen supplementation PRN   - C/w PRN albuterol & Symbicort   - Nephro-Dr. Mosher, following   - Cardiology-Dr. Still following   - Pulmonology-Dr. Loredo following   - ID-Dr. Newby following

## 2023-08-15 NOTE — PROGRESS NOTE ADULT - SUBJECTIVE AND OBJECTIVE BOX
DATE OF SERVICE: 08-15-23    Patient denies chest pain or shortness of breath.   Review of symptoms otherwise negative.    acetaminophen     Tablet .. 975 milliGRAM(s) Oral every 8 hours PRN  albuterol    90 MICROgram(s) HFA Inhaler 2 Puff(s) Inhalation every 6 hours  amLODIPine   Tablet 10 milliGRAM(s) Oral daily  aspirin enteric coated 81 milliGRAM(s) Oral daily  atorvastatin 40 milliGRAM(s) Oral at bedtime  budesonide 160 MICROgram(s)/formoterol 4.5 MICROgram(s) Inhaler 2 Puff(s) Inhalation two times a day  chlorhexidine 2% Cloths 1 Application(s) Topical daily  dextrose 5%. 1000 milliLiter(s) IV Continuous <Continuous>  dextrose 5%. 1000 milliLiter(s) IV Continuous <Continuous>  dextrose 50% Injectable 25 Gram(s) IV Push once  dextrose 50% Injectable 12.5 Gram(s) IV Push once  dextrose 50% Injectable 25 Gram(s) IV Push once  dextrose Oral Gel 15 Gram(s) Oral once PRN  epoetin beverley (PROCRIT) Injectable 14014 Unit(s) IV Push <User Schedule>  epoetin beverley-epbx (RETACRIT) Injectable 05962 Unit(s) IV Push <User Schedule>  folic acid 1 milliGRAM(s) Oral daily  furosemide    Tablet 40 milliGRAM(s) Oral daily  glucagon  Injectable 1 milliGRAM(s) IntraMuscular once  hydrALAZINE 25 milliGRAM(s) Oral three times a day  latanoprost 0.005% Ophthalmic Solution 1 Drop(s) Both EYES at bedtime  levothyroxine 25 MICROGram(s) Oral daily  lidocaine   4% Patch 2 Patch Transdermal daily  LORazepam     Tablet 2 milliGRAM(s) Oral every other day PRN  melatonin 3 milliGRAM(s) Oral at bedtime  metoprolol succinate ER 50 milliGRAM(s) Oral daily  ondansetron   Disintegrating Tablet 4 milliGRAM(s) Oral two times a day PRN  ondansetron Injectable 4 milliGRAM(s) IV Push every 6 hours PRN  pantoprazole    Tablet 40 milliGRAM(s) Oral before breakfast  sertraline 200 milliGRAM(s) Oral daily  sevelamer carbonate 800 milliGRAM(s) Oral three times a day with meals  sucralfate 1 Gram(s) Oral four times a day                            7.7    3.38  )-----------( 96       ( 14 Aug 2023 10:03 )             25.4       Hemoglobin: 7.7 g/dL (08-14 @ 10:03)  Hemoglobin: 8.3 g/dL (08-11 @ 06:23)      08-14    133<L>  |  98  |  25<H>  ----------------------------<  112<H>  4.4   |  27  |  11.70<H>    Ca    7.5<L>      14 Aug 2023 10:03  Phos  4.8     08-14  Mg     2.4     08-14      Creatinine Trend: 11.70<--, 12.90<--, 11.70<--, 17.50<--, 17.40<--, 16.90<--    COAGS:           T(C): 36.9 (08-15-23 @ 05:10), Max: 37.2 (08-14-23 @ 13:27)  HR: 81 (08-15-23 @ 10:35) (80 - 87)  BP: 148/67 (08-15-23 @ 10:35) (134/62 - 155/62)  RR: 18 (08-15-23 @ 05:10) (18 - 18)  SpO2: 99% (08-15-23 @ 10:35) (99% - 100%)  Wt(kg): --    I&O's Summary    14 Aug 2023 07:01  -  15 Aug 2023 07:00  --------------------------------------------------------  IN: 0 mL / OUT: 4 mL / NET: -4 mL        HEENT:  (-)icterus (-)pallor  CV: N S1 S2 1/6 DIANA (+)2 Pulses B/l  Resp:  Clear to ausculatation B/L, normal effort  GI: (+) BS Soft, NT, ND  Lymph:  (-)Edema, (-)obvious lymphadenopathy  Skin: Warm to touch, Normal turgor  Psych: Appropriate mood and affect         ASSESSMENT/PLAN: 	62y Male  Mateus Mruray, walks with RW, with PMH of ESRD on HD (TTS), BKA- L w/prosthetic leg, DM, Left eye blindness, CHF,, HTN, HLD, EF 45-50%, normal perfusion 4/23 osteoporosis, bronchitis, current smoker, and anemia who presents with complaint of missed dialysis.     # CHF  - Due to missed HD  - HD per renal    # Positive trop  - nothing to suggest ACS.  His trop has been higher in the past and demonstrated normal perfusion on Stress 4/23  - ECHO noted, normal LV fx severe pulm HTN, cont to keep net negative     # Noncompliance  - Behavioral health f/u  - refusing meds and blood tests as well as HD at times     # Smoking  - Cessation stressed    # HTN  - Suspect BP will improve once euvolemic, slowly improving     Dominic Still MD, FACC  BEEPER [phone]   DATE OF SERVICE: 08-15-23    Patient denies chest pain or shortness of breath.   Review of symptoms otherwise negative.    acetaminophen     Tablet .. 975 milliGRAM(s) Oral every 8 hours PRN  albuterol    90 MICROgram(s) HFA Inhaler 2 Puff(s) Inhalation every 6 hours  amLODIPine   Tablet 10 milliGRAM(s) Oral daily  aspirin enteric coated 81 milliGRAM(s) Oral daily  atorvastatin 40 milliGRAM(s) Oral at bedtime  budesonide 160 MICROgram(s)/formoterol 4.5 MICROgram(s) Inhaler 2 Puff(s) Inhalation two times a day  chlorhexidine 2% Cloths 1 Application(s) Topical daily  dextrose 5%. 1000 milliLiter(s) IV Continuous <Continuous>  dextrose 5%. 1000 milliLiter(s) IV Continuous <Continuous>  dextrose 50% Injectable 25 Gram(s) IV Push once  dextrose 50% Injectable 12.5 Gram(s) IV Push once  dextrose 50% Injectable 25 Gram(s) IV Push once  dextrose Oral Gel 15 Gram(s) Oral once PRN  epoetin beverley (PROCRIT) Injectable 31169 Unit(s) IV Push <User Schedule>  epoetin beverley-epbx (RETACRIT) Injectable 41060 Unit(s) IV Push <User Schedule>  folic acid 1 milliGRAM(s) Oral daily  furosemide    Tablet 40 milliGRAM(s) Oral daily  glucagon  Injectable 1 milliGRAM(s) IntraMuscular once  hydrALAZINE 25 milliGRAM(s) Oral three times a day  latanoprost 0.005% Ophthalmic Solution 1 Drop(s) Both EYES at bedtime  levothyroxine 25 MICROGram(s) Oral daily  lidocaine   4% Patch 2 Patch Transdermal daily  LORazepam     Tablet 2 milliGRAM(s) Oral every other day PRN  melatonin 3 milliGRAM(s) Oral at bedtime  metoprolol succinate ER 50 milliGRAM(s) Oral daily  ondansetron   Disintegrating Tablet 4 milliGRAM(s) Oral two times a day PRN  ondansetron Injectable 4 milliGRAM(s) IV Push every 6 hours PRN  pantoprazole    Tablet 40 milliGRAM(s) Oral before breakfast  sertraline 200 milliGRAM(s) Oral daily  sevelamer carbonate 800 milliGRAM(s) Oral three times a day with meals  sucralfate 1 Gram(s) Oral four times a day                            7.7    3.38  )-----------( 96       ( 14 Aug 2023 10:03 )             25.4       Hemoglobin: 7.7 g/dL (08-14 @ 10:03)  Hemoglobin: 8.3 g/dL (08-11 @ 06:23)      08-14    133<L>  |  98  |  25<H>  ----------------------------<  112<H>  4.4   |  27  |  11.70<H>    Ca    7.5<L>      14 Aug 2023 10:03  Phos  4.8     08-14  Mg     2.4     08-14      Creatinine Trend: 11.70<--, 12.90<--, 11.70<--, 17.50<--, 17.40<--, 16.90<--    COAGS:           T(C): 36.9 (08-15-23 @ 05:10), Max: 37.2 (08-14-23 @ 13:27)  HR: 81 (08-15-23 @ 10:35) (80 - 87)  BP: 148/67 (08-15-23 @ 10:35) (134/62 - 155/62)  RR: 18 (08-15-23 @ 05:10) (18 - 18)  SpO2: 99% (08-15-23 @ 10:35) (99% - 100%)  Wt(kg): --    I&O's Summary    14 Aug 2023 07:01  -  15 Aug 2023 07:00  --------------------------------------------------------  IN: 0 mL / OUT: 4 mL / NET: -4 mL        HEENT:  (-)icterus (-)pallor  CV: N S1 S2 1/6 DIANA (+)2 Pulses B/l  Resp:  Clear to ausculatation B/L, normal effort  GI: (+) BS Soft, NT, ND  Lymph:  (-)Edema, (-)obvious lymphadenopathy  Skin: Warm to touch, Normal turgor  Psych: Appropriate mood and affect         ASSESSMENT/PLAN: 	62y Male  Mateus Murray, walks with RW, with PMH of ESRD on HD (TTS), BKA- L w/prosthetic leg, DM, Left eye blindness, CHF,, HTN, HLD, EF 45-50%, normal perfusion 4/23 osteoporosis, bronchitis, current smoker, and anemia who presents with complaint of missed dialysis.     # CHF  - Due to missed HD  - HD per renal    # Positive trop  - nothing to suggest ACS.  His trop has been higher in the past and demonstrated normal perfusion on Stress 4/22  - ECHO noted, normal LV fx severe pulm HTN, cont to keep net negative     # Noncompliance  - Behavioral health f/u  - refusing meds and blood tests as well as HD at times     # Smoking  - Cessation stressed    # HTN  - Suspect BP will improve once euvolemic, slowly improving     Dominic Still MD, FACC  BEEPER [phone]

## 2023-08-15 NOTE — PROGRESS NOTE ADULT - PROBLEM SELECTOR PLAN 8
- C/w Zoloft  - C/w Ativan prior to HD sessions  - Psych-Dr. Linder reccs appreciated ---resolved  - C/w Zoloft  - C/w Ativan prior to HD sessions  - Psych-Dr. Linder reccs appreciated

## 2023-08-15 NOTE — PROGRESS NOTE ADULT - SUBJECTIVE AND OBJECTIVE BOX
[facility] : Progress Note :: [phone], (office [phone]),   Dr Mosher / Dr Washington / Dr Young / Dr Doll / Dr Varsha TURCIOS / Dr Tello / Dr Garcia / Dr Larry qiu  _____________________________________________________________________________________________    s/p HD yesterday- only did 2 hrs     No Known Drug Allergies  fish (Rash)  liver (Anaphylaxis)    Hospital Medications:   MEDICATIONS  (STANDING):  albuterol    90 MICROgram(s) HFA Inhaler 2 Puff(s) Inhalation every 6 hours  amLODIPine   Tablet 10 milliGRAM(s) Oral daily  aspirin enteric coated 81 milliGRAM(s) Oral daily  atorvastatin 40 milliGRAM(s) Oral at bedtime  budesonide 160 MICROgram(s)/formoterol 4.5 MICROgram(s) Inhaler 2 Puff(s) Inhalation two times a day  chlorhexidine 2% Cloths 1 Application(s) Topical daily  dextrose 5%. 1000 milliLiter(s) (100 mL/Hr) IV Continuous <Continuous>  dextrose 5%. 1000 milliLiter(s) (50 mL/Hr) IV Continuous <Continuous>  dextrose 50% Injectable 25 Gram(s) IV Push once  dextrose 50% Injectable 12.5 Gram(s) IV Push once  dextrose 50% Injectable 25 Gram(s) IV Push once  epoetin beverley (PROCRIT) Injectable 10022 Unit(s) IV Push <User Schedule>  epoetin beverley-epbx (RETACRIT) Injectable 87720 Unit(s) IV Push <User Schedule>  folic acid 1 milliGRAM(s) Oral daily  furosemide    Tablet 40 milliGRAM(s) Oral daily  glucagon  Injectable 1 milliGRAM(s) IntraMuscular once  hydrALAZINE 25 milliGRAM(s) Oral three times a day  latanoprost 0.005% Ophthalmic Solution 1 Drop(s) Both EYES at bedtime  levothyroxine 25 MICROGram(s) Oral daily  lidocaine   4% Patch 2 Patch Transdermal daily  melatonin 3 milliGRAM(s) Oral at bedtime  metoprolol succinate ER 50 milliGRAM(s) Oral daily  pantoprazole    Tablet 40 milliGRAM(s) Oral before breakfast  sertraline 200 milliGRAM(s) Oral daily  sevelamer carbonate 800 milliGRAM(s) Oral three times a day with meals  sucralfate 1 Gram(s) Oral four times a day      VITALS:  T(F): 98 (08-15-23 @ 14:02), Max: 99 (08-14-23 @ 21:23)  HR: 80 (08-15-23 @ 14:02)  BP: 147/74 (08-15-23 @ 14:02)  RR: 20 (08-15-23 @ 14:02)  SpO2: 100% (08-15-23 @ 14:02)  Wt(kg): --    08-14 @ 07:01  -  08-15 @ 07:00  --------------------------------------------------------  IN: 0 mL / OUT: 4 mL / NET: -4 mL    08-15 @ 07:01  -  08-15 @ 18:51  --------------------------------------------------------  IN: 0 mL / OUT: 5 mL / NET: -5 mL        PHYSICAL EXAM:  Constitutional: NAD  HEENT: anicteric sclera, oropharynx clear.  Neck: No JVD  Respiratory: CTAB, no wheezes, rales or rhonchi  Cardiovascular: S1, S2, RRR  Gastrointestinal: BS+, soft, NT/ND  Extremities: No peripheral edema  Neurological: A/O x 3, no focal deficits  Vascular Access: AVF with thrill and bruit     LABS:  08-15    136  |  104  |  17  ----------------------------<  102<H>  4.1   |  24  |  10.70<H>    Ca    9.2      15 Aug 2023 12:21  Phos  4.8     08-14  Mg     2.4     08-14      Creatinine Trend: 10.70 <--, 11.70 <--, 12.90 <--, 11.70 <--, 17.50 <--                        8.8    2.14  )-----------( 92       ( 15 Aug 2023 12:21 )             29.1     Urine Studies:  Urinalysis Basic - ( 15 Aug 2023 12:21 )    Color:  / Appearance:  / SG:  / pH:   Gluc: 102 mg/dL / Ketone:   / Bili:  / Urobili:    Blood:  / Protein:  / Nitrite:    Leuk Esterase:  / RBC:  / WBC    Sq Epi:  / Non Sq Epi:  / Bacteria:         RADIOLOGY & ADDITIONAL STUDIES:

## 2023-08-15 NOTE — PHYSICAL THERAPY INITIAL EVALUATION ADULT - NSACTIVITYREC_GEN_A_PT
Patient instructed to continue w/ HEP ; use RW or rollator at all times and be with supervision during gait.

## 2023-08-16 NOTE — PROGRESS NOTE ADULT - SUBJECTIVE AND OBJECTIVE BOX
NP Note discussed with  primary attending    Patient is a 62y old  Male who presents with a chief complaint of Missed dialysis (16 Aug 2023 10:02)      INTERVAL HPI/OVERNIGHT EVENTS: No new complaints.  Pt agreeable to HD today.      MEDICATIONS  (STANDING):  albuterol    90 MICROgram(s) HFA Inhaler 2 Puff(s) Inhalation every 6 hours  amLODIPine   Tablet 10 milliGRAM(s) Oral daily  aspirin enteric coated 81 milliGRAM(s) Oral daily  atorvastatin 40 milliGRAM(s) Oral at bedtime  budesonide 160 MICROgram(s)/formoterol 4.5 MICROgram(s) Inhaler 2 Puff(s) Inhalation two times a day  chlorhexidine 2% Cloths 1 Application(s) Topical daily  dextrose 5%. 1000 milliLiter(s) (100 mL/Hr) IV Continuous <Continuous>  dextrose 5%. 1000 milliLiter(s) (50 mL/Hr) IV Continuous <Continuous>  dextrose 50% Injectable 25 Gram(s) IV Push once  dextrose 50% Injectable 12.5 Gram(s) IV Push once  dextrose 50% Injectable 25 Gram(s) IV Push once  epoetin beverley (PROCRIT) Injectable 99581 Unit(s) IV Push <User Schedule>  epoetin beverley-epbx (RETACRIT) Injectable 08951 Unit(s) IV Push <User Schedule>  folic acid 1 milliGRAM(s) Oral daily  furosemide    Tablet 40 milliGRAM(s) Oral daily  glucagon  Injectable 1 milliGRAM(s) IntraMuscular once  hydrALAZINE 25 milliGRAM(s) Oral three times a day  latanoprost 0.005% Ophthalmic Solution 1 Drop(s) Both EYES at bedtime  levothyroxine 25 MICROGram(s) Oral daily  lidocaine   4% Patch 2 Patch Transdermal daily  melatonin 3 milliGRAM(s) Oral at bedtime  metoprolol succinate ER 50 milliGRAM(s) Oral daily  pantoprazole    Tablet 40 milliGRAM(s) Oral before breakfast  sertraline 200 milliGRAM(s) Oral daily  sevelamer carbonate 800 milliGRAM(s) Oral three times a day with meals  sucralfate 1 Gram(s) Oral four times a day    MEDICATIONS  (PRN):  acetaminophen     Tablet .. 975 milliGRAM(s) Oral every 8 hours PRN Moderate Pain (4 - 6)  dextrose Oral Gel 15 Gram(s) Oral once PRN Blood Glucose LESS THAN 70 milliGRAM(s)/deciliter  LORazepam     Tablet 2 milliGRAM(s) Oral every other day PRN Anxiety  ondansetron   Disintegrating Tablet 4 milliGRAM(s) Oral two times a day PRN Nausea and/or Vomiting  ondansetron Injectable 4 milliGRAM(s) IV Push every 6 hours PRN Nausea and/or Vomiting      __________________________________________________  REVIEW OF SYSTEMS:    CONSTITUTIONAL: No fever  EYES: No acute visual disturbances  NECK: No pain or stiffness  RESPIRATORY: No cough; No shortness of breath  CARDIOVASCULAR: No chest pain, no palpitations  GASTROINTESTINAL: No pain. No nausea or vomiting.  No diarrhea   NEUROLOGICAL: No headache or numbness, no tremors  MUSCULOSKELETAL: No joint pain, no muscle pain  GENITOURINARY: No dysuria, no frequency, no hesitancy  PSYCHIATRY: No depression , no anxiety  ALL OTHER  ROS negative        Vital Signs Last 24 Hrs  T(C): 36.7 (16 Aug 2023 10:12), Max: 37.2 (16 Aug 2023 04:30)  T(F): 98 (16 Aug 2023 10:12), Max: 99 (16 Aug 2023 04:30)  HR: 71 (16 Aug 2023 10:12) (71 - 82)  BP: 132/70 (16 Aug 2023 10:12) (132/70 - 147/74)  RR: 17 (16 Aug 2023 10:12) (17 - 20)  SpO2: 100% (16 Aug 2023 10:12) (100% - 100%)    Parameters below as of 16 Aug 2023 10:12  Patient On (Oxygen Delivery Method): nasal cannula  O2 Flow (L/min): 2      ________________________________________________  PHYSICAL EXAM:  GENERAL: NAD  HEENT: Normocephalic;  conjunctivae and sclerae clear; moist mucous membranes   NECK : Supple  CHEST/LUNG: Clear to auscultation bilaterally with good air entry   HEART: S1 S2  regular; no murmurs, gallops or rubs  ABDOMEN: Soft, Nontender, Nondistended; Bowel sounds present x 4 quad  EXTREMITIES: No cyanosis; no edema; no calf tenderness.  (+) R. AVF.  (+) L. BKA.  SKIN: Warm and dry; no rash  NERVOUS SYSTEM:  Awake and alert; Oriented to place, person and time; no new deficits  ________________________________________________  LABS:                        8.8    2.14  )-----------( 92       ( 15 Aug 2023 12:21 )             29.1     08-15    136  |  104  |  17  ----------------------------<  102<H>  4.1   |  24  |  10.70<H>    Ca    9.2      15 Aug 2023 12:21        Urinalysis Basic - ( 15 Aug 2023 12:21 )    Color: x / Appearance: x / SG: x / pH: x  Gluc: 102 mg/dL / Ketone: x  / Bili: x / Urobili: x   Blood: x / Protein: x / Nitrite: x   Leuk Esterase: x / RBC: x / WBC x   Sq Epi: x / Non Sq Epi: x / Bacteria: x      CAPILLARY BLOOD GLUCOSE      POCT Blood Glucose.: 94 mg/dL (16 Aug 2023 11:41)  POCT Blood Glucose.: 96 mg/dL (16 Aug 2023 08:16)  POCT Blood Glucose.: 105 mg/dL (15 Aug 2023 21:47)  POCT Blood Glucose.: 150 mg/dL (15 Aug 2023 17:04)        RADIOLOGY & ADDITIONAL TESTS:    Imaging Personally Reviewed:  YES    Consultant(s) Notes Reviewed:   YES    Care Discussed with Consultants :     Plan of care was discussed with patient and /or primary care giver; all questions and concerns were addressed and care was aligned with patient's wishes.

## 2023-08-16 NOTE — PROGRESS NOTE ADULT - PROBLEM SELECTOR PLAN 3
H/o ESRD on HD (T/Th/Sat)  - Pt refused HD on 8/5.  Completed 8 minutes on HD on 8/8.  Completed 2.5h HD on 8/9.    - Last HD 8/14.  Next HD today, 8/16.    - As discussed during family mtg, if pt's returning to longterm will need to complete full 3h sessions at dialysis.    - C/w Premedication prior to HD-Ativan 2mg PO PRN   - S/p Hepatitis panel  - Nephro-Dr. Mosher, following

## 2023-08-16 NOTE — PROGRESS NOTE ADULT - PROBLEM SELECTOR PLAN 9
H/o Diabetes on Lantus   - A1c 6.1  - S/p Hypoglycemic episodes on 8/12.  Most likely 2/2 poor PO intake and nausea.  Lantus DC'd on 8/12.    - Continue to monitor of DM meds and adjust accordingly

## 2023-08-16 NOTE — PROGRESS NOTE ADULT - ASSESSMENT
# ESRD.  Seen earlier on HD.  UF as tolerated  D/W pt compliance with HD   pre-HD Ativan recommended.  # anemia of CKD. epogen on HD. transfuse for HBG <7  #CKDMBD. cont kevin;amer  # HTN. blood pressure at goal

## 2023-08-16 NOTE — PROGRESS NOTE ADULT - PROBLEM SELECTOR PLAN 5
- D/t ESRD vs. possible gastric ulcer  - 8/8 1U PRBC's ordered but not given  - C/w Procrit   - C/w Protonix & Carafate   - Maintain type and screen Q3D  - GI-Dr. Lee consulted-No planned intervention

## 2023-08-16 NOTE — PROGRESS NOTE ADULT - PROBLEM SELECTOR PLAN 1
- Most likely Acute on chronic combined HF d/t missed HD resulting in volume overload & RLL HAP   - CT A/P noted for RLL PNA.  S/p HAP treatment.  Completed tx for HAP s/p Cefepime & Flagyl.    - S/p Repeat CXR showed worsening HF  - C/w Oxygen supplementation PRN   - C/w PRN albuterol & Symbicort   - Nephro-Dr. Mosher, following   - Cardiology-Dr. Still following   - Pulmonology-Dr. Loredo following   - ID-Dr. Newby following

## 2023-08-16 NOTE — PROGRESS NOTE ADULT - PROBLEM SELECTOR PLAN 11
- Most likely d/t ESRD  - Continue to monitor BMP in AM-f/u results    - Nephro-Dr. Mosher, following

## 2023-08-16 NOTE — PROGRESS NOTE ADULT - PROBLEM SELECTOR PLAN 8
- Resolved  - C/w Zoloft  - C/w Ativan prior to HD sessions  - Psych-Dr. Linder consulted, appreciated

## 2023-08-16 NOTE — PROGRESS NOTE ADULT - ASSESSMENT
62 [facility], walks with RW, with PMH of ESRD on HD (TTS), BKA- L w/prosthetic leg, DM, Left eye blindness, CHF, CAD, HTN, HLD, osteoporosis, bronchitis, current smoker, and anemia who presents with complaint of missed dialysis and does not make any urine. Patient states he was having shortness of breath.  Admitted for Acute Hyperkalemia 2/2 missed dialysis found to have pulmonary edema and BNP [id number].  Hospital course complicated by hypoglycemia, and rapid response for AHRF.  Hospital course further c/b pt's refusal of HD, IVs, medications and bloodwork.    As discussed w/ Attending, pt needs to complete a full 3h of dialysis before d/c.      PT recommended Home PT

## 2023-08-16 NOTE — PROGRESS NOTE ADULT - SUBJECTIVE AND OBJECTIVE BOX
Patient is a 62y old  Male who presents with a chief complaint of Missed dialysis (15 Aug 2023 18:51)    PATIENT IS SEEN AND EXAMINED IN MEDICAL FLOOR.  NGT [    ]    JEREMY [   ]      GT [   ]    ALLERGIES:  No Known Drug Allergies  fish (Rash)  liver (Anaphylaxis)      Daily     Daily     VITALS:    Vital Signs Last 24 Hrs  T(C): 37.2 (16 Aug 2023 04:30), Max: 37.2 (16 Aug 2023 04:30)  T(F): 99 (16 Aug 2023 04:30), Max: 99 (16 Aug 2023 04:30)  HR: 78 (16 Aug 2023 04:30) (78 - 82)  BP: 140/69 (16 Aug 2023 04:30) (137/71 - 148/67)  BP(mean): --  RR: 18 (16 Aug 2023 04:30) (18 - 20)  SpO2: 100% (16 Aug 2023 04:30) (99% - 100%)    Parameters below as of 16 Aug 2023 04:30  Patient On (Oxygen Delivery Method): nasal cannula  O2 Flow (L/min): 2      LABS:    CBC Full  -  ( 15 Aug 2023 12:21 )  WBC Count : 2.14 K/uL  RBC Count : 3.21 M/uL  Hemoglobin : 8.8 g/dL  Hematocrit : 29.1 %  Platelet Count - Automated : 92 K/uL  Mean Cell Volume : 90.7 fl  Mean Cell Hemoglobin : 27.4 pg  Mean Cell Hemoglobin Concentration : 30.2 gm/dL  Auto Neutrophil # : x  Auto Lymphocyte # : x  Auto Monocyte # : x  Auto Eosinophil # : x  Auto Basophil # : x  Auto Neutrophil % : x  Auto Lymphocyte % : x  Auto Monocyte % : x  Auto Eosinophil % : x  Auto Basophil % : x      08-15    136  |  104  |  17  ----------------------------<  102<H>  4.1   |  24  |  10.70<H>    Ca    9.2      15 Aug 2023 12:21      CAPILLARY BLOOD GLUCOSE      POCT Blood Glucose.: 96 mg/dL (16 Aug 2023 08:16)  POCT Blood Glucose.: 105 mg/dL (15 Aug 2023 21:47)  POCT Blood Glucose.: 150 mg/dL (15 Aug 2023 17:04)          Creatinine Trend: 10.70<--, 11.70<--, 12.90<--, 11.70<--, 17.50<--, 17.40<--  I&O's Summary    15 Aug 2023 07:01  -  16 Aug 2023 07:00  --------------------------------------------------------  IN: 0 mL / OUT: 5 mL / NET: -5 mL            .Blood Blood-Peripheral  05-28 @ 14:07   No Growth Final  --  --          MEDICATIONS:    MEDICATIONS  (STANDING):  albuterol    90 MICROgram(s) HFA Inhaler 2 Puff(s) Inhalation every 6 hours  amLODIPine   Tablet 10 milliGRAM(s) Oral daily  aspirin enteric coated 81 milliGRAM(s) Oral daily  atorvastatin 40 milliGRAM(s) Oral at bedtime  budesonide 160 MICROgram(s)/formoterol 4.5 MICROgram(s) Inhaler 2 Puff(s) Inhalation two times a day  chlorhexidine 2% Cloths 1 Application(s) Topical daily  dextrose 5%. 1000 milliLiter(s) (100 mL/Hr) IV Continuous <Continuous>  dextrose 5%. 1000 milliLiter(s) (50 mL/Hr) IV Continuous <Continuous>  dextrose 50% Injectable 25 Gram(s) IV Push once  dextrose 50% Injectable 12.5 Gram(s) IV Push once  dextrose 50% Injectable 25 Gram(s) IV Push once  epoetin beverley (PROCRIT) Injectable 68153 Unit(s) IV Push <User Schedule>  epoetin beverley-epbx (RETACRIT) Injectable 80538 Unit(s) IV Push <User Schedule>  folic acid 1 milliGRAM(s) Oral daily  furosemide    Tablet 40 milliGRAM(s) Oral daily  glucagon  Injectable 1 milliGRAM(s) IntraMuscular once  hydrALAZINE 25 milliGRAM(s) Oral three times a day  latanoprost 0.005% Ophthalmic Solution 1 Drop(s) Both EYES at bedtime  levothyroxine 25 MICROGram(s) Oral daily  lidocaine   4% Patch 2 Patch Transdermal daily  melatonin 3 milliGRAM(s) Oral at bedtime  metoprolol succinate ER 50 milliGRAM(s) Oral daily  pantoprazole    Tablet 40 milliGRAM(s) Oral before breakfast  sertraline 200 milliGRAM(s) Oral daily  sevelamer carbonate 800 milliGRAM(s) Oral three times a day with meals  sucralfate 1 Gram(s) Oral four times a day      MEDICATIONS  (PRN):  acetaminophen     Tablet .. 975 milliGRAM(s) Oral every 8 hours PRN Moderate Pain (4 - 6)  dextrose Oral Gel 15 Gram(s) Oral once PRN Blood Glucose LESS THAN 70 milliGRAM(s)/deciliter  LORazepam     Tablet 2 milliGRAM(s) Oral every other day PRN Anxiety  ondansetron   Disintegrating Tablet 4 milliGRAM(s) Oral two times a day PRN Nausea and/or Vomiting  ondansetron Injectable 4 milliGRAM(s) IV Push every 6 hours PRN Nausea and/or Vomiting      REVIEW OF SYSTEMS:                           ALL ROS DONE [ X   ]    CONSTITUTIONAL:  LETHARGIC [   ], FEVER [   ], UNRESPONSIVE [   ]  CVS:  CP  [   ], SOB, [   ], PALPITATIONS [   ], DIZZYNESS [   ]  RS: COUGH [   ], SPUTUM [   ]  GI: ABDOMINAL PAIN [   ], NAUSEA [   ], VOMITINGS [   ], DIARRHEA [   ], CONSTIPATION [   ]  :  DYSURIA [   ], NOCTURIA [   ], INCREASED FREQUENCY [   ], DRIBLING [   ],  SKELETAL: PAINFUL JOINTS [   ], SWOLLEN JOINTS [   ], NECK ACHE [   ], LOW BACK ACHE [   ],  SKIN : ULCERS [   ], RASH [   ], ITCHING [   ]  CNS: HEAD ACHE [   ], DOUBLE VISION [   ], BLURRED VISION [   ], AMS / CONFUSION [   ], SEIZURES [   ], WEAKNESS [   ],TINGLING / NUMBNESS [   ]    PHYSICAL EXAMINATION:    GENERAL APPEARANCE: NO DISTRESS,    ANASARCA ++  HEENT:  NO PALLOR, NO  JVD,  NO   NODES, NECK SUPPLE  CVS: S1 +, S2 +,   RS: AEEB,  OCCASIONAL  RALES +,   CRACKLES+ AT LUNG BASES  ABD: SOFT, NT, NO, BS +  EXT: LEFT BKA  SKIN: WARM,   SKELETAL:  ROM ACCEPTABLE  CNS:  AAO X  2-3        RADIOLOGY :    RADIOLOGY AND READINGS REVIEWED          ASSESSMENT :     Hypervolemia    Hypertension    Adrenal insufficiency    CKD (chronic kidney disease)    Anemia    Glaucoma    Coronary artery disease    HLD (hyperlipidemia)    Peripheral vascular disease    Spinal stenosis of lumbosacral region    Hyperparathyroidism    Diabetes mellitus    Diabetic neuropathy    Contracture of hand    Osteoarthritis    Osteoporosis    Vision loss of left eye    ESRD on hemodialysis    Cataract    BPH (benign prostatic hyperplasia)    UTI (urinary tract infection)    Bladder mass    H/O hematuria    Osteoporosis    Vision loss of right eye    Depression    Chronic GERD    Osteomyelitis of vertebra    CHF (congestive heart failure)    Below knee amputation status, left    History of right cataract extraction    History of left cataract extraction    S/P arteriovenous (AV) fistula creation    H/O hematuria    H/O transurethral destruction of bladder lesion    History of excision of mass        PLAN:  HPI:  Patient is 62 year old male from [facility], walks with RW, with PMH of ESRD on HD (TTS), BKA- L w/prosthetic leg, DM, Left eye blindness, CHF, CAD, HTN, HLD, osteoporosis, bronchitis, current smoker, and anemia who presents with complaint of missed dialysis. Last HD 7/22. Pt states he often missed HD sessions.  patient states he missed this HD session due to mild pain in left leg, patient remains able to ambulate. Pt complains of right leg swelling and states he does not make any urine. Patient states he was having mild shortness of breath.  Pt denies any fever, chills, N/V/D, constipation, CP, numbness or tingling (26 Jul 2023 19:20)    # [8/9] MEETING HELD WITH PATIENT, BROTHER ARTEMIO @ [phone] AND SW TEAM. PATIENT W/ HISTORY OF ROUTINE NONCOMPLIANCE W/ LIFE-SUSTAINING TREATMENT [HD], EVALUATIONS AND INTERVENTIONS - COUNSELLED AT LENGTH TO REMAIN COMPLIANT. DISCUSSED THAT PROGNOSIS IS POOR/GRIM GIVEN UNDERLYING MEDICAL CONDITIONS AND GIVEN NONCOMPLIANCE. HE VERBALIZED UNDERSTANDING. REGARDING GOC - PATIENT WISHES FOR FULL CODE. PALLIATIVE CARE CONSULTED. PSYCHIATRY CONSULTED. PATIENT EXPRESSED THAT HE DOES GROW IMPATIENT DURING DIALYSIS SESSIONS AND HAS BEEN LEAVING SESSIONS SOONER THAN PRESCRIBED. IN DISCUSSION THAT RISKS OF DEFERRING COMPLETE HD - INCLUDE BUT ARE NOT LIMITED TO WORSENING CLINICAL CONDITION, ELECTROLYTE ABNORMALITIES, ARRHYTHMIA AND DEATH. HE VERBALIZED UNDERSTANDING. D/W PATIENT THAT REPEATEDLY REFUSING INTERVENTIONS AND ASSESSMENTS IS NOT IN LINE WITH HIS WISHES TO IMPROVE. PATIENT VERBALIZED UNDERSTANDING. DISCUSSED REGARDING CURRENT CLINICAL CONDITION, RECOMMENDED EVALUATIONS AND INTERVENTIONS. ALL QUESTIONS ANSWERED. TEAM HAS OFFERED PATIENT EMOTIONAL SUPPORT AND OFFERED MEDICATION FOR MOOD. OFFERED TO PRE-MEDICATE W/ ANXIOLYTIC PRIOR TO HD. PATIENT IS AGREEABLE.     DISCUSSED THAT PATIENT WILL NEED TO COMPLY WITH HD SESSIONS IN ORDER TO BE MEDICALLY OPTIMIZED FOR DISCHARGE AND FOR SAFE D/C PLANNING. TEAM DISCUSSED OPTIONS OF RETURNING TO [facility] W/ OUTPATIENT HD , IF CARE NEEDS CAN BE SAFELY MET VS. NURSING HOME/[place] . PATIENT AND BROTHER VERBALIZED UNDERSTANDING.       # ACUTE ON CHRONIC HYPOXIC RESPIRATORY FAILURE S/T PULMONARY EDEMA - S/T INCOMPLETE HD SESSIONS ; ANASARCA  # HYPERKALEMIA  # HX OF COPD + S/P RECENT MULTIFOCAL PNEUMONIA ; R/O ASPIRATION PNEUMONIA  # PULMONARY HYPERTENSION  # UNCONTROLLED HTN  # ESRD ON HD TTS - W/ HX OF NONCOMPLIANCE    - TELEMETRY  - HYDRALAZINE, METOPROLOL AND NORVASC ; PRN IV ANTIHYPERTENSIVE  - LOKELMA  - SUPPLEMENTAL O2  - PLANNED FOR HD  - NEPHROLOGY CONSULT  - PULMONOLOGY CONSULT  - ID CONSULT    - CONTINUES TO REFUSE OR PRE-MATURELY DISCONTINUE SESSIONS OF HD       - [7/30] - OVERNIGHT RAPID RESPONSE S/T HYPOXIA - SELF-LIMITED - PATIENT IMPROVED S/P O2 SUPPLEMENT  - S/P CEFEPIME + FLAGYLL, BCX - NGTD      # RECURRENT INTRACTABLE NAUSEA/VOMITING, ? COFFEE GROUND EMESIS  # GASTROPARESIS  # HISTORY OF ESOPHAGITIS AND DUODENITIS [1/2021], HX OF GASTROPARESIS W/ EVIDENCE OF THICKENED ESOPHAGUS ON PREVIOUS CT SCAN   # PANCREAS W/ DOUBLE DUCT SIGN    - MONITORING HGB, PLACED ON PPI BID , CARAFATE QID  - PRN ANTIEMETICS  ; S/P DOSE OF REGLAN [WITH MINIMAL IMPROVEMENT]  - MONITORING FOR SYMPTOMS  - WHILE ON DIET PATIENT REPEATEDLY COUNSELLED TO CHEW FOOD CAUTIOUSLY AND CONSUME SMALL BITES W/ REGULAR CLEARING WITH WATER BETWEEN BITES    - ADVANCE DIET AS TOLERATED  - NOTED CT A/P    - S/P RECENT PRBC TRANSFUSION     - GI CONSULT  - SURGERY CONSULT    - [8/14] - EPISODE OF NAUSEA/VOMITING OVER THE WEEKEND - IMPROVED    # LESS LIKELY CHOLECYSTITIS  - S/P ABX  - NOTED CT A/P  - HIDA SCAN - NEGATIVE  - SURGERY CONSULT    # ELEVATED TROPONINS - ? DEMAND ISCHEMIA    - S/P TELEMETRY  - TREND TROPONINS  - ECHO - TRACE MR, MODERATELY INCREASED LV WALL THICKNESS, NORMAL LV SYSTOLIC FUNCTION, SEVERE PULMONARY HTN  - CARDIOLOGY CONSULT    # EPISODE OF HYPOGLYCEMIA - IMPROVED  # GASTROPARESIS  # UNDERLYING DM  - SSI + FS  - COUNSELLED TO COMPLY WITH HD TO REDUCE UREMIA    - REPEATEDLY COUNSELLED TO COMPLY WITH FINGERSTICKS      # DEPRESSION  - PLACED ON ZOLOFT  - PSYCHIATRY CONSULT    # PATIENT UNDERGOING OUTPATIENT WORKUP FOR CENTRAL VENOUS STENOSIS THROUGH NEPHROLOGY TEAM  - ? s/p STENT PLACEMENT    # ANEMIA OF CKD  # HX OF PANCYTOPENIA   - TREND HGB, TRANSFUSION THRESHOLD HGB < 7; PATIENT STILL INTERMITTENTLY REFUSING BLOODWORK, COUNSELLED TO COMPLY  - TYPE AND SCREEN  - ON EPO  - DENIES RECENT HEMATEMESIS, MELENA, HEMATOCHEZIA    - S/P RECENT PRBC TRANSFUSION  - S/P PRBC TRANSFUSION 7/29    - PPI BID, CARAFATE QID    # SEVERE PROTEIN CALORIE MALNUTRITION, FAILURE TO THRIVE   -  TEMPORAL WASTING, LOSS OF MUSCLE MASS FROM SHOULDER AND HIP GIRDLE  - NUTRITIONAL SUPPLEMENT    # HLD  # PARTIALLY BLIND  # S/P LEFT BKA  # HX OF PVD  # LS SPINAL STENOSIS  # GI AND DVT PPX   Patient is a 62y old  Male who presents with a chief complaint of Missed dialysis (15 Aug 2023 18:51)    PATIENT IS SEEN AND EXAMINED IN MEDICAL FLOOR.      ALLERGIES:  No Known Drug Allergies  fish (Rash)  liver (Anaphylaxis)      VITALS:    Vital Signs Last 24 Hrs  T(C): 37.2 (16 Aug 2023 04:30), Max: 37.2 (16 Aug 2023 04:30)  T(F): 99 (16 Aug 2023 04:30), Max: 99 (16 Aug 2023 04:30)  HR: 78 (16 Aug 2023 04:30) (78 - 82)  BP: 140/69 (16 Aug 2023 04:30) (137/71 - 148/67)  BP(mean): --  RR: 18 (16 Aug 2023 04:30) (18 - 20)  SpO2: 100% (16 Aug 2023 04:30) (99% - 100%)    Parameters below as of 16 Aug 2023 04:30  Patient On (Oxygen Delivery Method): nasal cannula  O2 Flow (L/min): 2      LABS:    CBC Full  -  ( 15 Aug 2023 12:21 )  WBC Count : 2.14 K/uL  RBC Count : 3.21 M/uL  Hemoglobin : 8.8 g/dL  Hematocrit : 29.1 %  Platelet Count - Automated : 92 K/uL  Mean Cell Volume : 90.7 fl  Mean Cell Hemoglobin : 27.4 pg  Mean Cell Hemoglobin Concentration : 30.2 gm/dL  Auto Neutrophil # : x  Auto Lymphocyte # : x  Auto Monocyte # : x  Auto Eosinophil # : x  Auto Basophil # : x  Auto Neutrophil % : x  Auto Lymphocyte % : x  Auto Monocyte % : x  Auto Eosinophil % : x  Auto Basophil % : x      08-15    136  |  104  |  17  ----------------------------<  102<H>  4.1   |  24  |  10.70<H>    Ca    9.2      15 Aug 2023 12:21      CAPILLARY BLOOD GLUCOSE      POCT Blood Glucose.: 96 mg/dL (16 Aug 2023 08:16)  POCT Blood Glucose.: 105 mg/dL (15 Aug 2023 21:47)  POCT Blood Glucose.: 150 mg/dL (15 Aug 2023 17:04)          Creatinine Trend: 10.70<--, 11.70<--, 12.90<--, 11.70<--, 17.50<--, 17.40<--  I&O's Summary    15 Aug 2023 07:01  -  16 Aug 2023 07:00  --------------------------------------------------------  IN: 0 mL / OUT: 5 mL / NET: -5 mL            .Blood Blood-Peripheral  05-28 @ 14:07   No Growth Final  --  --          MEDICATIONS:    MEDICATIONS  (STANDING):  albuterol    90 MICROgram(s) HFA Inhaler 2 Puff(s) Inhalation every 6 hours  amLODIPine   Tablet 10 milliGRAM(s) Oral daily  aspirin enteric coated 81 milliGRAM(s) Oral daily  atorvastatin 40 milliGRAM(s) Oral at bedtime  budesonide 160 MICROgram(s)/formoterol 4.5 MICROgram(s) Inhaler 2 Puff(s) Inhalation two times a day  chlorhexidine 2% Cloths 1 Application(s) Topical daily  dextrose 5%. 1000 milliLiter(s) (100 mL/Hr) IV Continuous <Continuous>  dextrose 5%. 1000 milliLiter(s) (50 mL/Hr) IV Continuous <Continuous>  dextrose 50% Injectable 25 Gram(s) IV Push once  dextrose 50% Injectable 12.5 Gram(s) IV Push once  dextrose 50% Injectable 25 Gram(s) IV Push once  epoetin beverley (PROCRIT) Injectable 19622 Unit(s) IV Push <User Schedule>  epoetin beverley-epbx (RETACRIT) Injectable 77328 Unit(s) IV Push <User Schedule>  folic acid 1 milliGRAM(s) Oral daily  furosemide    Tablet 40 milliGRAM(s) Oral daily  glucagon  Injectable 1 milliGRAM(s) IntraMuscular once  hydrALAZINE 25 milliGRAM(s) Oral three times a day  latanoprost 0.005% Ophthalmic Solution 1 Drop(s) Both EYES at bedtime  levothyroxine 25 MICROGram(s) Oral daily  lidocaine   4% Patch 2 Patch Transdermal daily  melatonin 3 milliGRAM(s) Oral at bedtime  metoprolol succinate ER 50 milliGRAM(s) Oral daily  pantoprazole    Tablet 40 milliGRAM(s) Oral before breakfast  sertraline 200 milliGRAM(s) Oral daily  sevelamer carbonate 800 milliGRAM(s) Oral three times a day with meals  sucralfate 1 Gram(s) Oral four times a day      MEDICATIONS  (PRN):  acetaminophen     Tablet .. 975 milliGRAM(s) Oral every 8 hours PRN Moderate Pain (4 - 6)  dextrose Oral Gel 15 Gram(s) Oral once PRN Blood Glucose LESS THAN 70 milliGRAM(s)/deciliter  LORazepam     Tablet 2 milliGRAM(s) Oral every other day PRN Anxiety  ondansetron   Disintegrating Tablet 4 milliGRAM(s) Oral two times a day PRN Nausea and/or Vomiting  ondansetron Injectable 4 milliGRAM(s) IV Push every 6 hours PRN Nausea and/or Vomiting      REVIEW OF SYSTEMS:                           ALL ROS DONE [ X   ]    CONSTITUTIONAL:  LETHARGIC [   ], FEVER [   ], UNRESPONSIVE [   ]  CVS:  CP  [   ], SOB, [   ], PALPITATIONS [   ], DIZZYNESS [   ]  RS: COUGH [   ], SPUTUM [   ]  GI: ABDOMINAL PAIN [   ], NAUSEA [   ], VOMITINGS [   ], DIARRHEA [   ], CONSTIPATION [   ]  :  DYSURIA [   ], NOCTURIA [   ], INCREASED FREQUENCY [   ], DRIBLING [   ],  SKELETAL: PAINFUL JOINTS [   ], SWOLLEN JOINTS [   ], NECK ACHE [   ], LOW BACK ACHE [   ],  SKIN : ULCERS [   ], RASH [   ], ITCHING [   ]  CNS: HEAD ACHE [   ], DOUBLE VISION [   ], BLURRED VISION [   ], AMS / CONFUSION [   ], SEIZURES [   ], WEAKNESS [   ],TINGLING / NUMBNESS [   ]    PHYSICAL EXAMINATION:    GENERAL APPEARANCE: NO DISTRESS,    ANASARCA ++  HEENT:  NO PALLOR, NO  JVD,  NO   NODES, NECK SUPPLE  CVS: S1 +, S2 +,   RS: AEEB,  OCCASIONAL  RALES +,   CRACKLES+ AT LUNG BASES [IMPROVED]  ABD: SOFT, NT, NO, BS +  EXT: LEFT BKA  SKIN: WARM,   SKELETAL:  ROM ACCEPTABLE  CNS:  AAO X  2-3        RADIOLOGY :    RADIOLOGY AND READINGS REVIEWED          ASSESSMENT :     Hypervolemia    Hypertension    Adrenal insufficiency    CKD (chronic kidney disease)    Anemia    Glaucoma    Coronary artery disease    HLD (hyperlipidemia)    Peripheral vascular disease    Spinal stenosis of lumbosacral region    Hyperparathyroidism    Diabetes mellitus    Diabetic neuropathy    Contracture of hand    Osteoarthritis    Osteoporosis    Vision loss of left eye    ESRD on hemodialysis    Cataract    BPH (benign prostatic hyperplasia)    UTI (urinary tract infection)    Bladder mass    H/O hematuria    Osteoporosis    Vision loss of right eye    Depression    Chronic GERD    Osteomyelitis of vertebra    CHF (congestive heart failure)    Below knee amputation status, left    History of right cataract extraction    History of left cataract extraction    S/P arteriovenous (AV) fistula creation    H/O hematuria    H/O transurethral destruction of bladder lesion    History of excision of mass        PLAN:  HPI:  Patient is 62 year old male from [facility], walks with RW, with PMH of ESRD on HD (TTS), BKA- L w/prosthetic leg, DM, Left eye blindness, CHF, CAD, HTN, HLD, osteoporosis, bronchitis, current smoker, and anemia who presents with complaint of missed dialysis. Last HD 7/22. Pt states he often missed HD sessions.  patient states he missed this HD session due to mild pain in left leg, patient remains able to ambulate. Pt complains of right leg swelling and states he does not make any urine. Patient states he was having mild shortness of breath.  Pt denies any fever, chills, N/V/D, constipation, CP, numbness or tingling (26 Jul 2023 19:20)    # [8/9] MEETING HELD WITH PATIENT, BROTHER ARTEMIO @ [phone] AND SW TEAM. PATIENT W/ HISTORY OF ROUTINE NONCOMPLIANCE W/ LIFE-SUSTAINING TREATMENT [HD], EVALUATIONS AND INTERVENTIONS - COUNSELLED AT LENGTH TO REMAIN COMPLIANT. DISCUSSED THAT PROGNOSIS IS POOR/GRIM GIVEN UNDERLYING MEDICAL CONDITIONS AND GIVEN NONCOMPLIANCE. HE VERBALIZED UNDERSTANDING. REGARDING GOC - PATIENT WISHES FOR FULL CODE. PALLIATIVE CARE CONSULTED. PSYCHIATRY CONSULTED. PATIENT EXPRESSED THAT HE DOES GROW IMPATIENT DURING DIALYSIS SESSIONS AND HAS BEEN LEAVING SESSIONS SOONER THAN PRESCRIBED. IN DISCUSSION THAT RISKS OF DEFERRING COMPLETE HD - INCLUDE BUT ARE NOT LIMITED TO WORSENING CLINICAL CONDITION, ELECTROLYTE ABNORMALITIES, ARRHYTHMIA AND DEATH. HE VERBALIZED UNDERSTANDING. D/W PATIENT THAT REPEATEDLY REFUSING INTERVENTIONS AND ASSESSMENTS IS NOT IN LINE WITH HIS WISHES TO IMPROVE. PATIENT VERBALIZED UNDERSTANDING. DISCUSSED REGARDING CURRENT CLINICAL CONDITION, RECOMMENDED EVALUATIONS AND INTERVENTIONS. ALL QUESTIONS ANSWERED. TEAM HAS OFFERED PATIENT EMOTIONAL SUPPORT AND OFFERED MEDICATION FOR MOOD. OFFERED TO PRE-MEDICATE W/ ANXIOLYTIC PRIOR TO HD. PATIENT IS AGREEABLE.     DISCUSSED THAT PATIENT WILL NEED TO COMPLY WITH HD SESSIONS IN ORDER TO BE MEDICALLY OPTIMIZED FOR DISCHARGE AND FOR SAFE D/C PLANNING. TEAM DISCUSSED OPTIONS OF RETURNING TO [facility] W/ OUTPATIENT HD , IF CARE NEEDS CAN BE SAFELY MET VS. NURSING HOME/[place] . PATIENT AND BROTHER VERBALIZED UNDERSTANDING.     - SW AND MEDICAL TEAM HAVING ONGOING DISCUSSION WITH PATIENT REGARDING CONSISTENT COMPLIANCE IN ORDER TO RETURN TO HD IN THE OUTPATIENT SETTING       # ACUTE ON CHRONIC HYPOXIC RESPIRATORY FAILURE S/T PULMONARY EDEMA - S/T INCOMPLETE HD SESSIONS ; ANASARCA  # HYPERKALEMIA  # HX OF COPD + S/P RECENT MULTIFOCAL PNEUMONIA ; R/O ASPIRATION PNEUMONIA  # PULMONARY HYPERTENSION  # UNCONTROLLED HTN  # ESRD ON HD TTS - W/ HX OF NONCOMPLIANCE    - TELEMETRY  - HYDRALAZINE, METOPROLOL AND NORVASC ; PRN IV ANTIHYPERTENSIVE  - LOKELMA  - SUPPLEMENTAL O2  - PLANNED FOR HD  - NEPHROLOGY CONSULT  - PULMONOLOGY CONSULT  - ID CONSULT    - CONTINUES TO REFUSE OR PRE-MATURELY DISCONTINUE SESSIONS OF HD       - [7/30] - OVERNIGHT RAPID RESPONSE S/T HYPOXIA - SELF-LIMITED - PATIENT IMPROVED S/P O2 SUPPLEMENT  - S/P CEFEPIME + FLAGYLL, BCX - NGTD      # RECURRENT INTRACTABLE NAUSEA/VOMITING, ? COFFEE GROUND EMESIS  # GASTROPARESIS  # HISTORY OF ESOPHAGITIS AND DUODENITIS [1/2021], HX OF GASTROPARESIS W/ EVIDENCE OF THICKENED ESOPHAGUS ON PREVIOUS CT SCAN   # PANCREAS W/ DOUBLE DUCT SIGN    - MONITORING HGB, PLACED ON PPI BID , CARAFATE QID  - PRN ANTIEMETICS  ; S/P DOSE OF REGLAN [WITH MINIMAL IMPROVEMENT]  - MONITORING FOR SYMPTOMS  - WHILE ON DIET PATIENT REPEATEDLY COUNSELLED TO CHEW FOOD CAUTIOUSLY AND CONSUME SMALL BITES W/ REGULAR CLEARING WITH WATER BETWEEN BITES    - ADVANCE DIET AS TOLERATED  - NOTED CT A/P    - S/P RECENT PRBC TRANSFUSION     - GI CONSULT  - SURGERY CONSULT    - [8/14] - EPISODE OF NAUSEA/VOMITING OVER THE WEEKEND - IMPROVED    # LESS LIKELY CHOLECYSTITIS  - S/P ABX  - NOTED CT A/P  - HIDA SCAN - NEGATIVE  - SURGERY CONSULT    # ELEVATED TROPONINS - ? DEMAND ISCHEMIA    - S/P TELEMETRY  - TREND TROPONINS  - ECHO - TRACE MR, MODERATELY INCREASED LV WALL THICKNESS, NORMAL LV SYSTOLIC FUNCTION, SEVERE PULMONARY HTN  - CARDIOLOGY CONSULT    # EPISODE OF HYPOGLYCEMIA - IMPROVED  # GASTROPARESIS  # UNDERLYING DM  - SSI + FS  - COUNSELLED TO COMPLY WITH HD TO REDUCE UREMIA    - REPEATEDLY COUNSELLED TO COMPLY WITH FINGERSTICKS      # DEPRESSION  - PLACED ON ZOLOFT  - PSYCHIATRY CONSULT    # PATIENT UNDERGOING OUTPATIENT WORKUP FOR CENTRAL VENOUS STENOSIS THROUGH NEPHROLOGY TEAM  - ? s/p STENT PLACEMENT    # ANEMIA OF CKD  # HX OF PANCYTOPENIA   - TREND HGB, TRANSFUSION THRESHOLD HGB < 7; PATIENT STILL INTERMITTENTLY REFUSING BLOODWORK, COUNSELLED TO COMPLY  - TYPE AND SCREEN  - ON EPO  - DENIES RECENT HEMATEMESIS, MELENA, HEMATOCHEZIA    - S/P RECENT PRBC TRANSFUSION  - S/P PRBC TRANSFUSION 7/29    - PPI BID, CARAFATE QID    # SEVERE PROTEIN CALORIE MALNUTRITION, FAILURE TO THRIVE   -  TEMPORAL WASTING, LOSS OF MUSCLE MASS FROM SHOULDER AND HIP GIRDLE  - NUTRITIONAL SUPPLEMENT    # HLD  # PARTIALLY BLIND  # S/P LEFT BKA  # HX OF PVD  # LS SPINAL STENOSIS  # GI AND DVT PPX

## 2023-08-16 NOTE — PROGRESS NOTE ADULT - SUBJECTIVE AND OBJECTIVE BOX
[facility] : Progress Note :: [phone], (office [phone]),   Dr Mosher / Dr Washington / Dr Young / Dr Doll / Dr Varsha TURCIOS / Dr Tello / Dr Garcia / Dr Larry qiu  _____________________________________________________________________________________________    seen on HD earlier    No Known Drug Allergies  fish (Rash)  liver (Anaphylaxis)    Hospital Medications:   MEDICATIONS  (STANDING):  albuterol    90 MICROgram(s) HFA Inhaler 2 Puff(s) Inhalation every 6 hours  amLODIPine   Tablet 10 milliGRAM(s) Oral daily  aspirin enteric coated 81 milliGRAM(s) Oral daily  atorvastatin 40 milliGRAM(s) Oral at bedtime  budesonide 160 MICROgram(s)/formoterol 4.5 MICROgram(s) Inhaler 2 Puff(s) Inhalation two times a day  chlorhexidine 2% Cloths 1 Application(s) Topical daily  dextrose 5%. 1000 milliLiter(s) (100 mL/Hr) IV Continuous <Continuous>  dextrose 5%. 1000 milliLiter(s) (50 mL/Hr) IV Continuous <Continuous>  dextrose 50% Injectable 25 Gram(s) IV Push once  dextrose 50% Injectable 12.5 Gram(s) IV Push once  dextrose 50% Injectable 25 Gram(s) IV Push once  epoetin beverley (PROCRIT) Injectable 01325 Unit(s) IV Push <User Schedule>  epoetin beverley-epbx (RETACRIT) Injectable 44664 Unit(s) IV Push <User Schedule>  folic acid 1 milliGRAM(s) Oral daily  furosemide    Tablet 40 milliGRAM(s) Oral daily  glucagon  Injectable 1 milliGRAM(s) IntraMuscular once  hydrALAZINE 25 milliGRAM(s) Oral three times a day  latanoprost 0.005% Ophthalmic Solution 1 Drop(s) Both EYES at bedtime  levothyroxine 25 MICROGram(s) Oral daily  lidocaine   4% Patch 2 Patch Transdermal daily  melatonin 3 milliGRAM(s) Oral at bedtime  metoprolol succinate ER 50 milliGRAM(s) Oral daily  pantoprazole    Tablet 40 milliGRAM(s) Oral before breakfast  sertraline 200 milliGRAM(s) Oral daily  sevelamer carbonate 800 milliGRAM(s) Oral three times a day with meals  sucralfate 1 Gram(s) Oral four times a day        VITALS:  T(F): 98.4 (08-16-23 @ 13:54), Max: 99 (08-16-23 @ 04:30)  HR: 78 (08-16-23 @ 13:54)  BP: 105/59 (08-16-23 @ 13:54)  RR: 17 (08-16-23 @ 13:54)  SpO2: 96% (08-16-23 @ 13:54)  Wt(kg): --    08-15 @ 07:01  -  08-16 @ 07:00  --------------------------------------------------------  IN: 0 mL / OUT: 5 mL / NET: -5 mL        PHYSICAL EXAM:  Constitutional: NAD  HEENT: anicteric sclera, oropharynx clear.  Neck: No JVD  Respiratory: CTAB, no wheezes, rales or rhonchi  Cardiovascular: S1, S2, RRR  Gastrointestinal: BS+, soft, NT/ND  Extremities:  peripheral edema+  Neurological: A/O x 3, no focal deficits  Vascular Access: AVF cannulated.    LABS:  08-15    136  |  104  |  17  ----------------------------<  102<H>  4.1   |  24  |  10.70<H>    Ca    9.2      15 Aug 2023 12:21      Creatinine Trend: 10.70 <--, 11.70 <--, 12.90 <--, 11.70 <--                        8.8    2.14  )-----------( 92       ( 15 Aug 2023 12:21 )             29.1     Urine Studies:  Urinalysis Basic - ( 15 Aug 2023 12:21 )    Color:  / Appearance:  / SG:  / pH:   Gluc: 102 mg/dL / Ketone:   / Bili:  / Urobili:    Blood:  / Protein:  / Nitrite:    Leuk Esterase:  / RBC:  / WBC    Sq Epi:  / Non Sq Epi:  / Bacteria:         RADIOLOGY & ADDITIONAL STUDIES:

## 2023-08-16 NOTE — PROGRESS NOTE ADULT - PROBLEM SELECTOR PLAN 12
- Continue to hold heparin due to ongoing anemia and concern for possible gastric ulcer  - C/w SCDs  - C/w Protonix for GI ppx

## 2023-08-16 NOTE — PROGRESS NOTE ADULT - PROBLEM SELECTOR PLAN 4
- P/w intractable vomiting and coffee ground emesis  - S/p CT A/P negative   - C/w Protonix   - C/w Zofran PRN  - Sx-Dr. Quan consulted-No planned intervention   - GI-Dr. Lee consulted-No planned intervention

## 2023-08-17 NOTE — PROGRESS NOTE ADULT - SUBJECTIVE AND OBJECTIVE BOX
Time of Visit:  Patient seen and examined.     MEDICATIONS  (STANDING):  albuterol    90 MICROgram(s) HFA Inhaler 2 Puff(s) Inhalation every 6 hours  amLODIPine   Tablet 10 milliGRAM(s) Oral daily  aspirin enteric coated 81 milliGRAM(s) Oral daily  atorvastatin 40 milliGRAM(s) Oral at bedtime  budesonide 160 MICROgram(s)/formoterol 4.5 MICROgram(s) Inhaler 2 Puff(s) Inhalation two times a day  chlorhexidine 2% Cloths 1 Application(s) Topical daily  dextrose 5%. 1000 milliLiter(s) (100 mL/Hr) IV Continuous <Continuous>  dextrose 5%. 1000 milliLiter(s) (50 mL/Hr) IV Continuous <Continuous>  dextrose 50% Injectable 25 Gram(s) IV Push once  dextrose 50% Injectable 12.5 Gram(s) IV Push once  dextrose 50% Injectable 25 Gram(s) IV Push once  epoetin beverley (PROCRIT) Injectable 55925 Unit(s) IV Push <User Schedule>  epoetin beverley-epbx (RETACRIT) Injectable 79017 Unit(s) IV Push <User Schedule>  folic acid 1 milliGRAM(s) Oral daily  furosemide    Tablet 40 milliGRAM(s) Oral daily  glucagon  Injectable 1 milliGRAM(s) IntraMuscular once  hydrALAZINE 25 milliGRAM(s) Oral three times a day  latanoprost 0.005% Ophthalmic Solution 1 Drop(s) Both EYES at bedtime  levothyroxine 25 MICROGram(s) Oral daily  lidocaine   4% Patch 2 Patch Transdermal daily  LORazepam     Tablet 2 milliGRAM(s) Oral <User Schedule>  melatonin 3 milliGRAM(s) Oral at bedtime  metoprolol succinate ER 50 milliGRAM(s) Oral daily  pantoprazole    Tablet 40 milliGRAM(s) Oral before breakfast  sertraline 200 milliGRAM(s) Oral daily  sevelamer carbonate 800 milliGRAM(s) Oral three times a day with meals  sucralfate 1 Gram(s) Oral four times a day      MEDICATIONS  (PRN):  acetaminophen     Tablet .. 975 milliGRAM(s) Oral every 8 hours PRN Moderate Pain (4 - 6)  dextrose Oral Gel 15 Gram(s) Oral once PRN Blood Glucose LESS THAN 70 milliGRAM(s)/deciliter  ondansetron   Disintegrating Tablet 4 milliGRAM(s) Oral two times a day PRN Nausea and/or Vomiting  ondansetron Injectable 4 milliGRAM(s) IV Push every 6 hours PRN Nausea and/or Vomiting       Medications up to date at time of exam.      PHYSICAL EXAMINATION:    Vital Signs Last 24 Hrs  T(C): 36.8 (17 Aug 2023 12:07), Max: 36.9 (16 Aug 2023 13:54)  T(F): 98.3 (17 Aug 2023 12:07), Max: 98.4 (16 Aug 2023 13:54)  HR: 75 (17 Aug 2023 12:07) (75 - 78)  BP: 140/73 (17 Aug 2023 12:07) (105/59 - 140/73)  BP(mean): 80 (17 Aug 2023 04:43) (80 - 80)  RR: 16 (17 Aug 2023 12:07) (16 - 18)  SpO2: 100% (17 Aug 2023 12:07) (96% - 100%)    Parameters below as of 17 Aug 2023 12:07  Patient On (Oxygen Delivery Method): nasal cannula  O2 Flow (L/min): 2     (if applicable)    General : Alert and oriented. Able to answer question with no SOB. No acute distress .     HEENT: Head is normocephalic and atraumatic. No nasal tenderness. Mucous membranes are moist.     NECK: Supple, no palpable adenopathy.    LUNGS: Clear to auscultation bilaterally with no wheezing, rales, or rhonchi. No use of accessory muscle.      HEART: S1 S2 Regular rate and no click / rub.     ABDOMEN: Soft, nontender, and nondistended. Active bowel sounds.     EXTREMITIES: Without any cyanosis, clubbing, rash, lesions .    NEUROLOGIC: Awake, alert, oriented.     SKIN: Warm and moist . Non diaphoretic.       LABS:                              MICROBIOLOGY: (if applicable)    RADIOLOGY & ADDITIONAL STUDIES:  EKG:   CXR:< from: Xray Chest 1 View-PORTABLE IMMEDIATE (Xray Chest 1 View-PORTABLE IMMEDIATE .) (07.30.23 @ 09:28) >    ACC: [id number] EXAM:  XR CHEST PORTABLE IMMED 1V   ORDERED BY: JULIETA MUJICA     PROCEDURE DATE:  07/30/2023          INTERPRETATION:  AP semisupine chest on July 30, 2023 at 9:14 AM. 2   images. Patient is short of breath.    Gross heart enlargement again noted. There is increasing perihilar   infiltrate now mild to moderate compared to July 26.    IMPRESSION: Increasing CHF. Gross heart enlargement again noted.    --- End of Report ---            ARASH REYNOSO MD; Attending Radiologist  This document has been electronically signed. Jul 30 2023  4:03PM    < end of copied text >    ECHO:    IMPRESSION: 62y Male 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      Osteoarthritis      Vision loss of left eye  blind      ESRD on hemodialysis  T/Th/S      Cataract  both eyes - hx of sx done      BPH (benign prostatic hyperplasia)      UTI (urinary tract infection)  hx of      Bladder mass  hx of      H/O hematuria      Osteoporosis      Vision loss of right eye  decreased      Depression      Chronic GERD      Osteomyelitis of vertebra      CHF (congestive heart failure)      Below knee amputation status, left  2012- pt is wearing prostesis      History of right cataract extraction      History of left cataract extraction      S/P arteriovenous (AV) fistula creation  right arm brachiocephalic arteriovenous fistula on 11/08/2018      H/O hematuria  s/p bladder bx and fulguration 2/25/2020      H/O transurethral destruction of bladder lesion  2020      History of excision of mass  back mass on 03/31/2021      Impression: This is a 62 yr old man  from [facility] with ESRD on HD ( T-Th-Sat ). Current smoker . Presented to ED due to Missed Dialysis, last HD 07-22-23 . Per Patient stated that he often missed HD sessions due to Mild left leg pain and right leg swelling . S/p RRT for SOB with Hypoxia due to Acute Hypoxic Respiratory Failure secondary to Pulmonary edema/ Fluid overload due to Missed Dialysis . CT Abdomen with Small Bilateral Pleural Effusions, Rt Lower Lung with groundglass opacity. No bowel obstruction. Small Gall Stone.         Suggestions:  O2 saturation 96% with O2 supplement. Continue Oxygen supplementation 2L NC.   Reinforced the importance of compliance to Dialysis schedule .   Continue Albuterol 90 mcg 2 Puffs Q 6 Hours .  Continue Symbicort 160-4.5 mcg 2 Puffs twice Daily.     On lasix 40 mg Oral Daily.    Aspiration precaution with HOB elevation especially during meal times.

## 2023-08-17 NOTE — PROGRESS NOTE ADULT - ASSESSMENT
62 year old male from [facility], walks with RW, with PMH of ESRD on HD (TTS), BKA- L w/prosthetic leg, DM, Left eye blindness, CHF, CAD, HTN, HLD, osteoporosis, bronchitis, current smoker, and anemia who presents with complaint of missed dialysis and does not make any urine. Patient states he was having shortness of breath.  Admitted for Acute Hyperkalemia 2/2 missed dialysis found to have pulmonary edema and BNP [id number].  Hospital course complicated by hypoglycemia, and rapid response for AHRF.  Hospital course further c/b pt's refusal of HD, IVs, medications and bloodwork.    As discussed w/ Attending, pt needs to complete a full 3h of dialysis before d/c.      PT recommended Home PT

## 2023-08-17 NOTE — PROGRESS NOTE ADULT - SUBJECTIVE AND OBJECTIVE BOX
NP Note discussed with  primary attending    Patient is a 62y old  Male who presents with a chief complaint of Missed dialysis (17 Aug 2023 12:18)      INTERVAL HPI/OVERNIGHT EVENTS: Reports he just went back to sleep.  Denies complaints or concerns at this time.      MEDICATIONS  (STANDING):  albuterol    90 MICROgram(s) HFA Inhaler 2 Puff(s) Inhalation every 6 hours  amLODIPine   Tablet 10 milliGRAM(s) Oral daily  aspirin enteric coated 81 milliGRAM(s) Oral daily  atorvastatin 40 milliGRAM(s) Oral at bedtime  budesonide 160 MICROgram(s)/formoterol 4.5 MICROgram(s) Inhaler 2 Puff(s) Inhalation two times a day  chlorhexidine 2% Cloths 1 Application(s) Topical daily  dextrose 5%. 1000 milliLiter(s) (100 mL/Hr) IV Continuous <Continuous>  dextrose 5%. 1000 milliLiter(s) (50 mL/Hr) IV Continuous <Continuous>  dextrose 50% Injectable 25 Gram(s) IV Push once  dextrose 50% Injectable 12.5 Gram(s) IV Push once  dextrose 50% Injectable 25 Gram(s) IV Push once  epoetin beverley (PROCRIT) Injectable 87865 Unit(s) IV Push <User Schedule>  epoetin beverley-epbx (RETACRIT) Injectable 17426 Unit(s) IV Push <User Schedule>  folic acid 1 milliGRAM(s) Oral daily  furosemide    Tablet 40 milliGRAM(s) Oral daily  glucagon  Injectable 1 milliGRAM(s) IntraMuscular once  hydrALAZINE 25 milliGRAM(s) Oral three times a day  latanoprost 0.005% Ophthalmic Solution 1 Drop(s) Both EYES at bedtime  levothyroxine 25 MICROGram(s) Oral daily  lidocaine   4% Patch 2 Patch Transdermal daily  LORazepam     Tablet 2 milliGRAM(s) Oral <User Schedule>  melatonin 3 milliGRAM(s) Oral at bedtime  metoprolol succinate ER 50 milliGRAM(s) Oral daily  pantoprazole    Tablet 40 milliGRAM(s) Oral before breakfast  sertraline 200 milliGRAM(s) Oral daily  sevelamer carbonate 800 milliGRAM(s) Oral three times a day with meals  sucralfate 1 Gram(s) Oral four times a day    MEDICATIONS  (PRN):  acetaminophen     Tablet .. 975 milliGRAM(s) Oral every 8 hours PRN Moderate Pain (4 - 6)  dextrose Oral Gel 15 Gram(s) Oral once PRN Blood Glucose LESS THAN 70 milliGRAM(s)/deciliter  ondansetron   Disintegrating Tablet 4 milliGRAM(s) Oral two times a day PRN Nausea and/or Vomiting  ondansetron Injectable 4 milliGRAM(s) IV Push every 6 hours PRN Nausea and/or Vomiting      __________________________________________________  REVIEW OF SYSTEMS:    CONSTITUTIONAL: No fever  EYES: No acute visual disturbances  NECK: No pain or stiffness  RESPIRATORY: No cough; No shortness of breath  CARDIOVASCULAR: No chest pain, no palpitations  GASTROINTESTINAL: No pain. No nausea or vomiting.  No diarrhea   NEUROLOGICAL: No headache or numbness, no tremors  MUSCULOSKELETAL: No joint pain, no muscle pain  GENITOURINARY: No dysuria, no frequency, no hesitancy  PSYCHIATRY: No depression , no anxiety  ALL OTHER  ROS negative        Vital Signs Last 24 Hrs  T(C): 36.8 (17 Aug 2023 12:07), Max: 36.9 (16 Aug 2023 13:54)  T(F): 98.3 (17 Aug 2023 12:07), Max: 98.4 (16 Aug 2023 13:54)  HR: 75 (17 Aug 2023 12:07) (75 - 78)  BP: 140/73 (17 Aug 2023 12:07) (105/59 - 140/73)  BP(mean): 80 (17 Aug 2023 04:43) (80 - 80)  RR: 16 (17 Aug 2023 12:07) (16 - 18)  SpO2: 100% (17 Aug 2023 12:07) (96% - 100%)    Parameters below as of 17 Aug 2023 12:07  Patient On (Oxygen Delivery Method): nasal cannula  O2 Flow (L/min): 2      ________________________________________________  PHYSICAL EXAM:  GENERAL: NAD  HEENT: Normocephalic;  conjunctivae and sclerae clear; moist mucous membranes   NECK : Supple  CHEST/LUNG: Clear to auscultation bilaterally with good air entry   HEART: S1 S2  regular; no murmurs, gallops or rubs  ABDOMEN: Soft, Nontender, Nondistended; Bowel sounds present x 4 quad  EXTREMITIES: No cyanosis; no edema; no calf tenderness.  (+) R. AVF.  (+) L. BKA.  SKIN: Warm and dry; no rash  NERVOUS SYSTEM:  Awake and alert; Oriented to place, person and time; no new deficits  _________________________________________________  LABS:              CAPILLARY BLOOD GLUCOSE      POCT Blood Glucose.: 135 mg/dL (17 Aug 2023 11:35)  POCT Blood Glucose.: 88 mg/dL (17 Aug 2023 07:37)  POCT Blood Glucose.: 132 mg/dL (16 Aug 2023 16:38)        RADIOLOGY & ADDITIONAL TESTS:    Imaging Personally Reviewed:  YES    Consultant(s) Notes Reviewed:   YES    Care Discussed with Consultants :     Plan of care was discussed with patient and /or primary care giver; all questions and concerns were addressed and care was aligned with patient's wishes.

## 2023-08-17 NOTE — PROGRESS NOTE ADULT - PROBLEM SELECTOR PLAN 3
H/o ESRD on HD (T/Th/Sat)  - Pt refused HD on 8/5.  Completed 8 minutes on HD on 8/8.  Completed 2.5h HD on 8/9.    - Last HD 8/16.    - As discussed during family mtg, if pt's returning to [facility] will need to complete full 3h sessions at dialysis.    - C/w Premedication prior to HD-Ativan 2mg PO PRN   - S/p Hepatitis panel  - Nephro-Dr. Mosher, following H/o ESRD on HD (T/Th/Sat)  - Pt refused HD on 8/5.  Completed 8 minutes on HD on 8/8.  Completed 2.5h HD on 8/9.    - Last HD 8/16.    - As discussed during family mtg, if pt's returning to [facility] will need to complete full 3h sessions at dialysis.    - C/w Premedication prior to HD-Ativan 2mg PO standing predialysis   - S/p Hepatitis panel  - Nephro-Dr. Mosher, following

## 2023-08-17 NOTE — PROGRESS NOTE ADULT - SUBJECTIVE AND OBJECTIVE BOX
DATE OF SERVICE: 08-17-23    Patient denies chest pain or shortness of breath.   Review of symptoms otherwise negative.    acetaminophen     Tablet .. 975 milliGRAM(s) Oral every 8 hours PRN  albuterol    90 MICROgram(s) HFA Inhaler 2 Puff(s) Inhalation every 6 hours  amLODIPine   Tablet 10 milliGRAM(s) Oral daily  aspirin enteric coated 81 milliGRAM(s) Oral daily  atorvastatin 40 milliGRAM(s) Oral at bedtime  budesonide 160 MICROgram(s)/formoterol 4.5 MICROgram(s) Inhaler 2 Puff(s) Inhalation two times a day  chlorhexidine 2% Cloths 1 Application(s) Topical daily  dextrose 5%. 1000 milliLiter(s) IV Continuous <Continuous>  dextrose 5%. 1000 milliLiter(s) IV Continuous <Continuous>  dextrose 50% Injectable 25 Gram(s) IV Push once  dextrose 50% Injectable 12.5 Gram(s) IV Push once  dextrose 50% Injectable 25 Gram(s) IV Push once  dextrose Oral Gel 15 Gram(s) Oral once PRN  epoetin beverley (PROCRIT) Injectable 86438 Unit(s) IV Push <User Schedule>  epoetin beverley-epbx (RETACRIT) Injectable 31366 Unit(s) IV Push <User Schedule>  folic acid 1 milliGRAM(s) Oral daily  furosemide    Tablet 40 milliGRAM(s) Oral daily  glucagon  Injectable 1 milliGRAM(s) IntraMuscular once  hydrALAZINE 25 milliGRAM(s) Oral three times a day  latanoprost 0.005% Ophthalmic Solution 1 Drop(s) Both EYES at bedtime  levothyroxine 25 MICROGram(s) Oral daily  lidocaine   4% Patch 2 Patch Transdermal daily  LORazepam     Tablet 2 milliGRAM(s) Oral <User Schedule>  melatonin 3 milliGRAM(s) Oral at bedtime  metoprolol succinate ER 50 milliGRAM(s) Oral daily  ondansetron   Disintegrating Tablet 4 milliGRAM(s) Oral two times a day PRN  ondansetron Injectable 4 milliGRAM(s) IV Push every 6 hours PRN  pantoprazole    Tablet 40 milliGRAM(s) Oral before breakfast  sertraline 200 milliGRAM(s) Oral daily  sevelamer carbonate 800 milliGRAM(s) Oral three times a day with meals  sucralfate 1 Gram(s) Oral four times a day                            8.8    2.14  )-----------( 92       ( 15 Aug 2023 12:21 )             29.1       Hemoglobin: 8.8 g/dL (08-15 @ 12:21)  Hemoglobin: 7.7 g/dL (08-14 @ 10:03)      08-15    136  |  104  |  17  ----------------------------<  102<H>  4.1   |  24  |  10.70<H>    Ca    9.2      15 Aug 2023 12:21      Creatinine Trend: 10.70<--, 11.70<--, 12.90<--, 11.70<--, 17.50<--, 17.40<--    COAGS:           T(C): 36.8 (08-17-23 @ 12:07), Max: 36.9 (08-16-23 @ 13:54)  HR: 75 (08-17-23 @ 12:07) (75 - 78)  BP: 140/73 (08-17-23 @ 12:07) (105/59 - 140/73)  RR: 16 (08-17-23 @ 12:07) (16 - 18)  SpO2: 100% (08-17-23 @ 12:07) (96% - 100%)  Wt(kg): --    I&O's Summary        HEENT:  (-)icterus (-)pallor  CV: N S1 S2 1/6 DIANA (+)2 Pulses B/l  Resp:  Clear to ausculatation B/L, normal effort  GI: (+) BS Soft, NT, ND  Lymph:  (-)Edema, (-)obvious lymphadenopathy  Skin: Warm to touch, Normal turgor  Psych: Appropriate mood and affect         ASSESSMENT/PLAN: 	62y Male  [place], walks with RW, with PMH of ESRD on HD (TTS), BKA- L w/prosthetic leg, DM, Left eye blindness, CHF,, HTN, HLD, EF 45-50%, normal perfusion 4/23 osteoporosis, bronchitis, current smoker, and anemia who presents with complaint of missed dialysis.     # CHF  - Due to missed HD  - HD per renal    # Positive trop  - nothing to suggest ACS.  His trop has been higher in the past and demonstrated normal perfusion on Stress 4/22  - ECHO noted, normal LV fx severe pulm HTN, cont to keep net negative     # Noncompliance  - Behavioral health f/u  - refusing meds and blood tests as well as HD at times     # Smoking  - Cessation stressed    # HTN  - BP much improved with HD  - No need for further inpatient cardiac work up.  - I will sign off please call back if needed    Dominic Still MD, [facility]  BEEPER [phone]

## 2023-08-17 NOTE — PROGRESS NOTE ADULT - SUBJECTIVE AND OBJECTIVE BOX
[facility] : Progress Note :: [phone], (office [phone]),   Dr Mosher / Dr Washington / Dr Young / Dr Doll / Dr Varsha TURCIOS / Dr Tello / Dr Garcia / Dr Larry qiu  _____________________________________________________________________________________________  Patient is a 62y Male with    No Known Drug Allergies  fish (Rash)  liver (Anaphylaxis)    Hospital Medications:   MEDICATIONS  (STANDING):  albuterol    90 MICROgram(s) HFA Inhaler 2 Puff(s) Inhalation every 6 hours  amLODIPine   Tablet 10 milliGRAM(s) Oral daily  aspirin enteric coated 81 milliGRAM(s) Oral daily  atorvastatin 40 milliGRAM(s) Oral at bedtime  budesonide 160 MICROgram(s)/formoterol 4.5 MICROgram(s) Inhaler 2 Puff(s) Inhalation two times a day  chlorhexidine 2% Cloths 1 Application(s) Topical daily  dextrose 5%. 1000 milliLiter(s) (100 mL/Hr) IV Continuous <Continuous>  dextrose 5%. 1000 milliLiter(s) (50 mL/Hr) IV Continuous <Continuous>  dextrose 50% Injectable 25 Gram(s) IV Push once  dextrose 50% Injectable 12.5 Gram(s) IV Push once  dextrose 50% Injectable 25 Gram(s) IV Push once  epoetin beverley (PROCRIT) Injectable 00848 Unit(s) IV Push <User Schedule>  epoetin beverley-epbx (RETACRIT) Injectable 36746 Unit(s) IV Push <User Schedule>  folic acid 1 milliGRAM(s) Oral daily  furosemide    Tablet 40 milliGRAM(s) Oral daily  glucagon  Injectable 1 milliGRAM(s) IntraMuscular once  hydrALAZINE 25 milliGRAM(s) Oral three times a day  latanoprost 0.005% Ophthalmic Solution 1 Drop(s) Both EYES at bedtime  levothyroxine 25 MICROGram(s) Oral daily  lidocaine   4% Patch 2 Patch Transdermal daily  LORazepam     Tablet 2 milliGRAM(s) Oral <User Schedule>  melatonin 3 milliGRAM(s) Oral at bedtime  metoprolol succinate ER 50 milliGRAM(s) Oral daily  pantoprazole    Tablet 40 milliGRAM(s) Oral before breakfast  sertraline 200 milliGRAM(s) Oral daily  sevelamer carbonate 800 milliGRAM(s) Oral three times a day with meals  sucralfate 1 Gram(s) Oral four times a day    REVIEW OF SYSTEMS:  CONSTITUTIONAL: No weakness, fevers or chills  EYES/ENT: No visual changes;  No vertigo or throat pain   NECK: No pain or stiffness  RESPIRATORY: No cough, wheezing, hemoptysis; No shortness of breath  CARDIOVASCULAR: No chest pain or palpitations.  GASTROINTESTINAL: No abdominal or epigastric pain. No nausea, vomiting, or hematemesis; No diarrhea or constipation. No melena or hematochezia.  GENITOURINARY: No dysuria, frequency, foamy urine, urinary urgency, incontinence or hematuria  NEUROLOGICAL: No numbness or weakness  SKIN: No itching, burning, rashes, or lesions   VASCULAR: No bilateral lower extremity edema.   All other review of systems is negative unless indicated above.    VITALS:  T(F): 98.3 (08-17-23 @ 12:07), Max: 98.3 (08-17-23 @ 12:07)  HR: 75 (08-17-23 @ 12:07)  BP: 140/73 (08-17-23 @ 12:07)  RR: 16 (08-17-23 @ 12:07)  SpO2: 100% (08-17-23 @ 12:07)  Wt(kg): --      PHYSICAL EXAM:  Constitutional: NAD  HEENT: anicteric sclera, oropharynx clear, MMM  Neck: No JVD  Respiratory: CTAB, no wheezes, rales or rhonchi  Cardiovascular: S1, S2, RRR  Gastrointestinal: BS+, soft, NT/ND  Extremities: No cyanosis or clubbing. No peripheral edema  Neurological: A/O x 3, no focal deficits  Vascular Access: AVF with thrill and bruit     LABS:        Creatinine Trend: 10.70 <--, 11.70 <--, 12.90 <--    Urine Studies:  Urinalysis Basic - ( 15 Aug 2023 12:21 )    Color:  / Appearance:  / SG:  / pH:   Gluc: 102 mg/dL / Ketone:   / Bili:  / Urobili:    Blood:  / Protein:  / Nitrite:    Leuk Esterase:  / RBC:  / WBC    Sq Epi:  / Non Sq Epi:  / Bacteria:         RADIOLOGY & ADDITIONAL STUDIES:

## 2023-08-17 NOTE — PROGRESS NOTE ADULT - SUBJECTIVE AND OBJECTIVE BOX
Patient is a 62y old  Male who presents with a chief complaint of Missed dialysis (16 Aug 2023 15:21)    PATIENT IS SEEN AND EXAMINED IN MEDICAL FLOOR.  NGT [    ]    JEREMY [   ]      GT [   ]    ALLERGIES:  No Known Drug Allergies  fish (Rash)  liver (Anaphylaxis)      Daily     Daily Weight in k.8 (17 Aug 2023 04:43)    VITALS:    Vital Signs Last 24 Hrs  T(C): 36.6 (17 Aug 2023 04:43), Max: 36.9 (16 Aug 2023 13:54)  T(F): 97.8 (17 Aug 2023 04:43), Max: 98.4 (16 Aug 2023 13:54)  HR: 78 (17 Aug 2023 04:43) (71 - 78)  BP: 129/65 (17 Aug 2023 04:43) (105/59 - 133/55)  BP(mean): 80 (17 Aug 2023 04:43) (80 - 80)  RR: 18 (17 Aug 2023 04:43) (16 - 18)  SpO2: 100% (17 Aug 2023 04:43) (96% - 100%)    Parameters below as of 17 Aug 2023 04:43  Patient On (Oxygen Delivery Method): nasal cannula  O2 Flow (L/min): 2      LABS:    CBC Full  -  ( 15 Aug 2023 12:21 )  WBC Count : 2.14 K/uL  RBC Count : 3.21 M/uL  Hemoglobin : 8.8 g/dL  Hematocrit : 29.1 %  Platelet Count - Automated : 92 K/uL  Mean Cell Volume : 90.7 fl  Mean Cell Hemoglobin : 27.4 pg  Mean Cell Hemoglobin Concentration : 30.2 gm/dL  Auto Neutrophil # : x  Auto Lymphocyte # : x  Auto Monocyte # : x  Auto Eosinophil # : x  Auto Basophil # : x  Auto Neutrophil % : x  Auto Lymphocyte % : x  Auto Monocyte % : x  Auto Eosinophil % : x  Auto Basophil % : x      08-15    136  |  104  |  17  ----------------------------<  102<H>  4.1   |  24  |  10.70<H>    Ca    9.2      15 Aug 2023 12:21      CAPILLARY BLOOD GLUCOSE      POCT Blood Glucose.: 88 mg/dL (17 Aug 2023 07:37)  POCT Blood Glucose.: 132 mg/dL (16 Aug 2023 16:38)  POCT Blood Glucose.: 94 mg/dL (16 Aug 2023 11:41)          Creatinine Trend: 10.70<--, 11.70<--, 12.90<--, 11.70<--, 17.50<--, 17.40<--  I&O's Summary          .Blood Blood-Peripheral  05- @ 14:07   No Growth Final  --  --          MEDICATIONS:    MEDICATIONS  (STANDING):  albuterol    90 MICROgram(s) HFA Inhaler 2 Puff(s) Inhalation every 6 hours  amLODIPine   Tablet 10 milliGRAM(s) Oral daily  aspirin enteric coated 81 milliGRAM(s) Oral daily  atorvastatin 40 milliGRAM(s) Oral at bedtime  budesonide 160 MICROgram(s)/formoterol 4.5 MICROgram(s) Inhaler 2 Puff(s) Inhalation two times a day  chlorhexidine 2% Cloths 1 Application(s) Topical daily  dextrose 5%. 1000 milliLiter(s) (100 mL/Hr) IV Continuous <Continuous>  dextrose 5%. 1000 milliLiter(s) (50 mL/Hr) IV Continuous <Continuous>  dextrose 50% Injectable 25 Gram(s) IV Push once  dextrose 50% Injectable 12.5 Gram(s) IV Push once  dextrose 50% Injectable 25 Gram(s) IV Push once  epoetin beverley (PROCRIT) Injectable 92450 Unit(s) IV Push <User Schedule>  epoetin beverley-epbx (RETACRIT) Injectable 44027 Unit(s) IV Push <User Schedule>  folic acid 1 milliGRAM(s) Oral daily  furosemide    Tablet 40 milliGRAM(s) Oral daily  glucagon  Injectable 1 milliGRAM(s) IntraMuscular once  hydrALAZINE 25 milliGRAM(s) Oral three times a day  latanoprost 0.005% Ophthalmic Solution 1 Drop(s) Both EYES at bedtime  levothyroxine 25 MICROGram(s) Oral daily  lidocaine   4% Patch 2 Patch Transdermal daily  LORazepam     Tablet 2 milliGRAM(s) Oral <User Schedule>  melatonin 3 milliGRAM(s) Oral at bedtime  metoprolol succinate ER 50 milliGRAM(s) Oral daily  pantoprazole    Tablet 40 milliGRAM(s) Oral before breakfast  sertraline 200 milliGRAM(s) Oral daily  sevelamer carbonate 800 milliGRAM(s) Oral three times a day with meals  sucralfate 1 Gram(s) Oral four times a day      MEDICATIONS  (PRN):  acetaminophen     Tablet .. 975 milliGRAM(s) Oral every 8 hours PRN Moderate Pain (4 - 6)  dextrose Oral Gel 15 Gram(s) Oral once PRN Blood Glucose LESS THAN 70 milliGRAM(s)/deciliter  ondansetron   Disintegrating Tablet 4 milliGRAM(s) Oral two times a day PRN Nausea and/or Vomiting  ondansetron Injectable 4 milliGRAM(s) IV Push every 6 hours PRN Nausea and/or Vomiting      REVIEW OF SYSTEMS:                           ALL ROS DONE [ X   ]    CONSTITUTIONAL:  LETHARGIC [   ], FEVER [   ], UNRESPONSIVE [   ]  CVS:  CP  [   ], SOB, [   ], PALPITATIONS [   ], DIZZYNESS [   ]  RS: COUGH [   ], SPUTUM [   ]  GI: ABDOMINAL PAIN [   ], NAUSEA [   ], VOMITINGS [   ], DIARRHEA [   ], CONSTIPATION [   ]  :  DYSURIA [   ], NOCTURIA [   ], INCREASED FREQUENCY [   ], DRIBLING [   ],  SKELETAL: PAINFUL JOINTS [   ], SWOLLEN JOINTS [   ], NECK ACHE [   ], LOW BACK ACHE [   ],  SKIN : ULCERS [   ], RASH [   ], ITCHING [   ]  CNS: HEAD ACHE [   ], DOUBLE VISION [   ], BLURRED VISION [   ], AMS / CONFUSION [   ], SEIZURES [   ], WEAKNESS [   ],TINGLING / NUMBNESS [   ]    GENERAL APPEARANCE: NO DISTRESS,    ANASARCA ++  HEENT:  NO PALLOR, NO  JVD,  NO   NODES, NECK SUPPLE  CVS: S1 +, S2 +,   RS: AEEB,  OCCASIONAL  RALES +,   CRACKLES+ AT LUNG BASES [IMPROVED]  ABD: SOFT, NT, NO, BS +  EXT: LEFT BKA  SKIN: WARM,   SKELETAL:  ROM ACCEPTABLE  CNS:  AAO X  2-3        RADIOLOGY :    RADIOLOGY AND READINGS REVIEWED          ASSESSMENT :     Hypervolemia    Hypertension    Adrenal insufficiency    CKD (chronic kidney disease)    Anemia    Glaucoma    Coronary artery disease    HLD (hyperlipidemia)    Peripheral vascular disease    Spinal stenosis of lumbosacral region    Hyperparathyroidism    Diabetes mellitus    Diabetic neuropathy    Contracture of hand    Osteoarthritis    Osteoporosis    Vision loss of left eye    ESRD on hemodialysis    Cataract    BPH (benign prostatic hyperplasia)    UTI (urinary tract infection)    Bladder mass    H/O hematuria    Osteoporosis    Vision loss of right eye    Depression    Chronic GERD    Osteomyelitis of vertebra    CHF (congestive heart failure)    Below knee amputation status, left    History of right cataract extraction    History of left cataract extraction    S/P arteriovenous (AV) fistula creation    H/O hematuria    H/O transurethral destruction of bladder lesion    History of excision of mass        PLAN:  HPI:  Patient is 62 year old male from [facility], walks with RW, with PMH of ESRD on HD (TTS), BKA- L w/prosthetic leg, DM, Left eye blindness, CHF, CAD, HTN, HLD, osteoporosis, bronchitis, current smoker, and anemia who presents with complaint of missed dialysis. Last HD . Pt states he often missed HD sessions.  patient states he missed this HD session due to mild pain in left leg, patient remains able to ambulate. Pt complains of right leg swelling and states he does not make any urine. Patient states he was having mild shortness of breath.  Pt denies any fever, chills, N/V/D, constipation, CP, numbness or tingling (2023 19:20)    # [8/9] MEETING HELD WITH PATIENT, BROTHER ARTEMIO @ [phone] AND SW TEAM. PATIENT W/ HISTORY OF ROUTINE NONCOMPLIANCE W/ LIFE-SUSTAINING TREATMENT [HD], EVALUATIONS AND INTERVENTIONS - COUNSELLED AT LENGTH TO REMAIN COMPLIANT. DISCUSSED THAT PROGNOSIS IS POOR/GRIM GIVEN UNDERLYING MEDICAL CONDITIONS AND GIVEN NONCOMPLIANCE. HE VERBALIZED UNDERSTANDING. REGARDING GOC - PATIENT WISHES FOR FULL CODE. PALLIATIVE CARE CONSULTED. PSYCHIATRY CONSULTED. PATIENT EXPRESSED THAT HE DOES GROW IMPATIENT DURING DIALYSIS SESSIONS AND HAS BEEN LEAVING SESSIONS SOONER THAN PRESCRIBED. IN DISCUSSION THAT RISKS OF DEFERRING COMPLETE HD - INCLUDE BUT ARE NOT LIMITED TO WORSENING CLINICAL CONDITION, ELECTROLYTE ABNORMALITIES, ARRHYTHMIA AND DEATH. HE VERBALIZED UNDERSTANDING. D/W PATIENT THAT REPEATEDLY REFUSING INTERVENTIONS AND ASSESSMENTS IS NOT IN LINE WITH HIS WISHES TO IMPROVE. PATIENT VERBALIZED UNDERSTANDING. DISCUSSED REGARDING CURRENT CLINICAL CONDITION, RECOMMENDED EVALUATIONS AND INTERVENTIONS. ALL QUESTIONS ANSWERED. TEAM HAS OFFERED PATIENT EMOTIONAL SUPPORT AND OFFERED MEDICATION FOR MOOD. OFFERED TO PRE-MEDICATE W/ ANXIOLYTIC PRIOR TO HD. PATIENT IS AGREEABLE.     DISCUSSED THAT PATIENT WILL NEED TO COMPLY WITH HD SESSIONS IN ORDER TO BE MEDICALLY OPTIMIZED FOR DISCHARGE AND FOR SAFE D/C PLANNING. TEAM DISCUSSED OPTIONS OF RETURNING TO [facility] W/ OUTPATIENT HD , IF CARE NEEDS CAN BE SAFELY MET VS. NURSING HOME/[facility] . PATIENT AND BROTHER VERBALIZED UNDERSTANDING.     - SW AND MEDICAL TEAM HAVING ONGOING DISCUSSION WITH PATIENT REGARDING CONSISTENT COMPLIANCE IN ORDER TO RETURN TO HD IN THE OUTPATIENT SETTING       # ACUTE ON CHRONIC HYPOXIC RESPIRATORY FAILURE S/T PULMONARY EDEMA - S/T INCOMPLETE HD SESSIONS ; ANASARCA  # HYPERKALEMIA  # HX OF COPD + S/P RECENT MULTIFOCAL PNEUMONIA ; R/O ASPIRATION PNEUMONIA  # PULMONARY HYPERTENSION  # UNCONTROLLED HTN  # ESRD ON HD TTS - W/ HX OF NONCOMPLIANCE    - TELEMETRY  - HYDRALAZINE, METOPROLOL AND NORVASC ; PRN IV ANTIHYPERTENSIVE  - LOKELMA  - SUPPLEMENTAL O2  - PLANNED FOR HD  - NEPHROLOGY CONSULT  - PULMONOLOGY CONSULT  - ID CONSULT    - CONTINUES TO REFUSE OR PRE-MATURELY DISCONTINUE SESSIONS OF HD       - [] - OVERNIGHT RAPID RESPONSE S/T HYPOXIA - SELF-LIMITED - PATIENT IMPROVED S/P O2 SUPPLEMENT  - S/P CEFEPIME + FLAGYLL, BCX - NGTD      # RECURRENT INTRACTABLE NAUSEA/VOMITING, ? COFFEE GROUND EMESIS  # GASTROPARESIS  # HISTORY OF ESOPHAGITIS AND DUODENITIS [2021], HX OF GASTROPARESIS W/ EVIDENCE OF THICKENED ESOPHAGUS ON PREVIOUS CT SCAN   # PANCREAS W/ DOUBLE DUCT SIGN    - MONITORING HGB, PLACED ON PPI BID , CARAFATE QID  - PRN ANTIEMETICS  ; S/P DOSE OF REGLAN [WITH MINIMAL IMPROVEMENT]  - MONITORING FOR SYMPTOMS  - WHILE ON DIET PATIENT REPEATEDLY COUNSELLED TO CHEW FOOD CAUTIOUSLY AND CONSUME SMALL BITES W/ REGULAR CLEARING WITH WATER BETWEEN BITES    - ADVANCE DIET AS TOLERATED  - NOTED CT A/P    - S/P RECENT PRBC TRANSFUSION     - GI CONSULT  - SURGERY CONSULT    - [] - EPISODE OF NAUSEA/VOMITING OVER THE WEEKEND - IMPROVED    # LESS LIKELY CHOLECYSTITIS  - S/P ABX  - NOTED CT A/P  - HIDA SCAN - NEGATIVE  - SURGERY CONSULT    # ELEVATED TROPONINS - ? DEMAND ISCHEMIA    - S/P TELEMETRY  - TREND TROPONINS  - ECHO - TRACE MR, MODERATELY INCREASED LV WALL THICKNESS, NORMAL LV SYSTOLIC FUNCTION, SEVERE PULMONARY HTN  - CARDIOLOGY CONSULT    # EPISODE OF HYPOGLYCEMIA - IMPROVED  # GASTROPARESIS  # UNDERLYING DM  - SSI + FS  - COUNSELLED TO COMPLY WITH HD TO REDUCE UREMIA    - REPEATEDLY COUNSELLED TO COMPLY WITH FINGERSTICKS      # DEPRESSION  - PLACED ON ZOLOFT  - PSYCHIATRY CONSULT    # PATIENT UNDERGOING OUTPATIENT WORKUP FOR CENTRAL VENOUS STENOSIS THROUGH NEPHROLOGY TEAM  - ? s/p STENT PLACEMENT    # ANEMIA OF CKD  # HX OF PANCYTOPENIA   - TREND HGB, TRANSFUSION THRESHOLD HGB < 7; PATIENT STILL INTERMITTENTLY REFUSING BLOODWORK, COUNSELLED TO COMPLY  - TYPE AND SCREEN  - ON EPO  - DENIES RECENT HEMATEMESIS, MELENA, HEMATOCHEZIA    - S/P RECENT PRBC TRANSFUSION  - S/P PRBC TRANSFUSION     - PPI BID, CARAFATE QID    # SEVERE PROTEIN CALORIE MALNUTRITION, FAILURE TO THRIVE   -  TEMPORAL WASTING, LOSS OF MUSCLE MASS FROM SHOULDER AND HIP GIRDLE  - NUTRITIONAL SUPPLEMENT    # HLD  # PARTIALLY BLIND  # S/P LEFT BKA  # HX OF PVD  # LS SPINAL STENOSIS  # GI AND DVT PPX   Patient is a 62y old  Male who presents with a chief complaint of Missed dialysis (16 Aug 2023 15:21)    PATIENT IS SEEN AND EXAMINED IN MEDICAL FLOOR.      ALLERGIES:  No Known Drug Allergies  fish (Rash)  liver (Anaphylaxis)      Daily     Daily Weight in k.8 (17 Aug 2023 04:43)    VITALS:    Vital Signs Last 24 Hrs  T(C): 36.6 (17 Aug 2023 04:43), Max: 36.9 (16 Aug 2023 13:54)  T(F): 97.8 (17 Aug 2023 04:43), Max: 98.4 (16 Aug 2023 13:54)  HR: 78 (17 Aug 2023 04:43) (71 - 78)  BP: 129/65 (17 Aug 2023 04:43) (105/59 - 133/55)  BP(mean): 80 (17 Aug 2023 04:43) (80 - 80)  RR: 18 (17 Aug 2023 04:43) (16 - 18)  SpO2: 100% (17 Aug 2023 04:43) (96% - 100%)    Parameters below as of 17 Aug 2023 04:43  Patient On (Oxygen Delivery Method): nasal cannula  O2 Flow (L/min): 2      LABS:    CBC Full  -  ( 15 Aug 2023 12:21 )  WBC Count : 2.14 K/uL  RBC Count : 3.21 M/uL  Hemoglobin : 8.8 g/dL  Hematocrit : 29.1 %  Platelet Count - Automated : 92 K/uL  Mean Cell Volume : 90.7 fl  Mean Cell Hemoglobin : 27.4 pg  Mean Cell Hemoglobin Concentration : 30.2 gm/dL  Auto Neutrophil # : x  Auto Lymphocyte # : x  Auto Monocyte # : x  Auto Eosinophil # : x  Auto Basophil # : x  Auto Neutrophil % : x  Auto Lymphocyte % : x  Auto Monocyte % : x  Auto Eosinophil % : x  Auto Basophil % : x      08-15    136  |  104  |  17  ----------------------------<  102<H>  4.1   |  24  |  10.70<H>    Ca    9.2      15 Aug 2023 12:21      CAPILLARY BLOOD GLUCOSE      POCT Blood Glucose.: 88 mg/dL (17 Aug 2023 07:37)  POCT Blood Glucose.: 132 mg/dL (16 Aug 2023 16:38)  POCT Blood Glucose.: 94 mg/dL (16 Aug 2023 11:41)          Creatinine Trend: 10.70<--, 11.70<--, 12.90<--, 11.70<--, 17.50<--, 17.40<--  I&O's Summary          .Blood Blood-Peripheral  - @ 14:07   No Growth Final  --  --          MEDICATIONS:    MEDICATIONS  (STANDING):  albuterol    90 MICROgram(s) HFA Inhaler 2 Puff(s) Inhalation every 6 hours  amLODIPine   Tablet 10 milliGRAM(s) Oral daily  aspirin enteric coated 81 milliGRAM(s) Oral daily  atorvastatin 40 milliGRAM(s) Oral at bedtime  budesonide 160 MICROgram(s)/formoterol 4.5 MICROgram(s) Inhaler 2 Puff(s) Inhalation two times a day  chlorhexidine 2% Cloths 1 Application(s) Topical daily  dextrose 5%. 1000 milliLiter(s) (100 mL/Hr) IV Continuous <Continuous>  dextrose 5%. 1000 milliLiter(s) (50 mL/Hr) IV Continuous <Continuous>  dextrose 50% Injectable 25 Gram(s) IV Push once  dextrose 50% Injectable 12.5 Gram(s) IV Push once  dextrose 50% Injectable 25 Gram(s) IV Push once  epoetin beverley (PROCRIT) Injectable 86815 Unit(s) IV Push <User Schedule>  epoetin beverley-epbx (RETACRIT) Injectable 19700 Unit(s) IV Push <User Schedule>  folic acid 1 milliGRAM(s) Oral daily  furosemide    Tablet 40 milliGRAM(s) Oral daily  glucagon  Injectable 1 milliGRAM(s) IntraMuscular once  hydrALAZINE 25 milliGRAM(s) Oral three times a day  latanoprost 0.005% Ophthalmic Solution 1 Drop(s) Both EYES at bedtime  levothyroxine 25 MICROGram(s) Oral daily  lidocaine   4% Patch 2 Patch Transdermal daily  LORazepam     Tablet 2 milliGRAM(s) Oral <User Schedule>  melatonin 3 milliGRAM(s) Oral at bedtime  metoprolol succinate ER 50 milliGRAM(s) Oral daily  pantoprazole    Tablet 40 milliGRAM(s) Oral before breakfast  sertraline 200 milliGRAM(s) Oral daily  sevelamer carbonate 800 milliGRAM(s) Oral three times a day with meals  sucralfate 1 Gram(s) Oral four times a day      MEDICATIONS  (PRN):  acetaminophen     Tablet .. 975 milliGRAM(s) Oral every 8 hours PRN Moderate Pain (4 - 6)  dextrose Oral Gel 15 Gram(s) Oral once PRN Blood Glucose LESS THAN 70 milliGRAM(s)/deciliter  ondansetron   Disintegrating Tablet 4 milliGRAM(s) Oral two times a day PRN Nausea and/or Vomiting  ondansetron Injectable 4 milliGRAM(s) IV Push every 6 hours PRN Nausea and/or Vomiting      REVIEW OF SYSTEMS:                           ALL ROS DONE [ X   ]    CONSTITUTIONAL:  LETHARGIC [   ], FEVER [   ], UNRESPONSIVE [   ]  CVS:  CP  [   ], SOB, [   ], PALPITATIONS [   ], DIZZYNESS [   ]  RS: COUGH [   ], SPUTUM [   ]  GI: ABDOMINAL PAIN [   ], NAUSEA [   ], VOMITINGS [   ], DIARRHEA [   ], CONSTIPATION [   ]  :  DYSURIA [   ], NOCTURIA [   ], INCREASED FREQUENCY [   ], DRIBLING [   ],  SKELETAL: PAINFUL JOINTS [   ], SWOLLEN JOINTS [   ], NECK ACHE [   ], LOW BACK ACHE [   ],  SKIN : ULCERS [   ], RASH [   ], ITCHING [   ]  CNS: HEAD ACHE [   ], DOUBLE VISION [   ], BLURRED VISION [   ], AMS / CONFUSION [   ], SEIZURES [   ], WEAKNESS [   ],TINGLING / NUMBNESS [   ]    GENERAL APPEARANCE: NO DISTRESS,    ANASARCA ++  HEENT:  NO PALLOR, NO  JVD,  NO   NODES, NECK SUPPLE  CVS: S1 +, S2 +,   RS: AEEB,  OCCASIONAL  RALES +,   CRACKLES+ AT LUNG BASES [IMPROVED]  ABD: SOFT, NT, NO, BS +  EXT: LEFT BKA  SKIN: WARM,   SKELETAL:  ROM ACCEPTABLE  CNS:  AAO X  2-3        RADIOLOGY :    RADIOLOGY AND READINGS REVIEWED          ASSESSMENT :     Hypervolemia    Hypertension    Adrenal insufficiency    CKD (chronic kidney disease)    Anemia    Glaucoma    Coronary artery disease    HLD (hyperlipidemia)    Peripheral vascular disease    Spinal stenosis of lumbosacral region    Hyperparathyroidism    Diabetes mellitus    Diabetic neuropathy    Contracture of hand    Osteoarthritis    Osteoporosis    Vision loss of left eye    ESRD on hemodialysis    Cataract    BPH (benign prostatic hyperplasia)    UTI (urinary tract infection)    Bladder mass    H/O hematuria    Osteoporosis    Vision loss of right eye    Depression    Chronic GERD    Osteomyelitis of vertebra    CHF (congestive heart failure)    Below knee amputation status, left    History of right cataract extraction    History of left cataract extraction    S/P arteriovenous (AV) fistula creation    H/O hematuria    H/O transurethral destruction of bladder lesion    History of excision of mass        PLAN:  HPI:  Patient is 62 year old male from [facility], walks with RW, with PMH of ESRD on HD (TTS), BKA- L w/prosthetic leg, DM, Left eye blindness, CHF, CAD, HTN, HLD, osteoporosis, bronchitis, current smoker, and anemia who presents with complaint of missed dialysis. Last HD . Pt states he often missed HD sessions.  patient states he missed this HD session due to mild pain in left leg, patient remains able to ambulate. Pt complains of right leg swelling and states he does not make any urine. Patient states he was having mild shortness of breath.  Pt denies any fever, chills, N/V/D, constipation, CP, numbness or tingling (2023 19:20)    # [8/9] MEETING HELD WITH PATIENT, BROTHER ARTEMIO @ [phone] AND SW TEAM. PATIENT W/ HISTORY OF ROUTINE NONCOMPLIANCE W/ LIFE-SUSTAINING TREATMENT [HD], EVALUATIONS AND INTERVENTIONS - COUNSELLED AT LENGTH TO REMAIN COMPLIANT. DISCUSSED THAT PROGNOSIS IS POOR/GRIM GIVEN UNDERLYING MEDICAL CONDITIONS AND GIVEN NONCOMPLIANCE. HE VERBALIZED UNDERSTANDING. REGARDING GOC - PATIENT WISHES FOR FULL CODE. PALLIATIVE CARE CONSULTED. PSYCHIATRY CONSULTED. PATIENT EXPRESSED THAT HE DOES GROW IMPATIENT DURING DIALYSIS SESSIONS AND HAS BEEN LEAVING SESSIONS SOONER THAN PRESCRIBED. IN DISCUSSION THAT RISKS OF DEFERRING COMPLETE HD - INCLUDE BUT ARE NOT LIMITED TO WORSENING CLINICAL CONDITION, ELECTROLYTE ABNORMALITIES, ARRHYTHMIA AND DEATH. HE VERBALIZED UNDERSTANDING. D/W PATIENT THAT REPEATEDLY REFUSING INTERVENTIONS AND ASSESSMENTS IS NOT IN LINE WITH HIS WISHES TO IMPROVE. PATIENT VERBALIZED UNDERSTANDING. DISCUSSED REGARDING CURRENT CLINICAL CONDITION, RECOMMENDED EVALUATIONS AND INTERVENTIONS. ALL QUESTIONS ANSWERED. TEAM HAS OFFERED PATIENT EMOTIONAL SUPPORT AND OFFERED MEDICATION FOR MOOD. OFFERED TO PRE-MEDICATE W/ ANXIOLYTIC PRIOR TO HD. PATIENT IS AGREEABLE.     DISCUSSED THAT PATIENT WILL NEED TO COMPLY WITH HD SESSIONS IN ORDER TO BE MEDICALLY OPTIMIZED FOR DISCHARGE AND FOR SAFE D/C PLANNING. TEAM DISCUSSED OPTIONS OF RETURNING TO [facility] AL W/ OUTPATIENT HD , IF CARE NEEDS CAN BE SAFELY MET VS. NURSING HOME/[place] . PATIENT AND BROTHER VERBALIZED UNDERSTANDING.     - SW AND MEDICAL TEAM HAVING ONGOING DISCUSSION WITH PATIENT REGARDING CONSISTENT COMPLIANCE IN ORDER TO RETURN TO HD IN THE OUTPATIENT SETTING       # ACUTE ON CHRONIC HYPOXIC RESPIRATORY FAILURE S/T PULMONARY EDEMA - S/T INCOMPLETE HD SESSIONS ; ANASARCA  # HYPERKALEMIA  # HX OF COPD + S/P RECENT MULTIFOCAL PNEUMONIA ; R/O ASPIRATION PNEUMONIA  # PULMONARY HYPERTENSION  # UNCONTROLLED HTN  # ESRD ON HD TTS - W/ HX OF NONCOMPLIANCE    - TELEMETRY  - HYDRALAZINE, METOPROLOL AND NORVASC ; PRN IV ANTIHYPERTENSIVE  - LOKELMA  - SUPPLEMENTAL O2  - PLANNED FOR HD  - NEPHROLOGY CONSULT  - PULMONOLOGY CONSULT  - ID CONSULT    - CONTINUES TO REFUSE OR PRE-MATURELY DISCONTINUE SESSIONS OF HD       - [] - OVERNIGHT RAPID RESPONSE S/T HYPOXIA - SELF-LIMITED - PATIENT IMPROVED S/P O2 SUPPLEMENT  - S/P CEFEPIME + FLAGYLL, BCX - NGTD      # RECURRENT INTRACTABLE NAUSEA/VOMITING, ? COFFEE GROUND EMESIS  # GASTROPARESIS  # HISTORY OF ESOPHAGITIS AND DUODENITIS [2021], HX OF GASTROPARESIS W/ EVIDENCE OF THICKENED ESOPHAGUS ON PREVIOUS CT SCAN   # PANCREAS W/ DOUBLE DUCT SIGN    - MONITORING HGB, PLACED ON PPI BID , CARAFATE QID  - PRN ANTIEMETICS  ; S/P DOSE OF REGLAN [WITH MINIMAL IMPROVEMENT]  - MONITORING FOR SYMPTOMS  - WHILE ON DIET PATIENT REPEATEDLY COUNSELLED TO CHEW FOOD CAUTIOUSLY AND CONSUME SMALL BITES W/ REGULAR CLEARING WITH WATER BETWEEN BITES    - ADVANCE DIET AS TOLERATED  - NOTED CT A/P    - S/P RECENT PRBC TRANSFUSION     - GI CONSULT  - SURGERY CONSULT    - [] - EPISODE OF NAUSEA/VOMITING OVER THE WEEKEND - IMPROVED    # LESS LIKELY CHOLECYSTITIS  - S/P ABX  - NOTED CT A/P  - HIDA SCAN - NEGATIVE  - SURGERY CONSULT    # ELEVATED TROPONINS - ? DEMAND ISCHEMIA    - S/P TELEMETRY  - TREND TROPONINS  - ECHO - TRACE MR, MODERATELY INCREASED LV WALL THICKNESS, NORMAL LV SYSTOLIC FUNCTION, SEVERE PULMONARY HTN  - CARDIOLOGY CONSULT    # EPISODE OF HYPOGLYCEMIA - IMPROVED  # GASTROPARESIS  # UNDERLYING DM  - SSI + FS  - COUNSELLED TO COMPLY WITH HD TO REDUCE UREMIA    - REPEATEDLY COUNSELLED TO COMPLY WITH FINGERSTICKS      # DEPRESSION  - PLACED ON ZOLOFT  - PSYCHIATRY CONSULT    # PATIENT UNDERGOING OUTPATIENT WORKUP FOR CENTRAL VENOUS STENOSIS THROUGH NEPHROLOGY TEAM  - ? s/p STENT PLACEMENT    # ANEMIA OF CKD  # HX OF PANCYTOPENIA   - TREND HGB, TRANSFUSION THRESHOLD HGB < 7; PATIENT STILL INTERMITTENTLY REFUSING BLOODWORK, COUNSELLED TO COMPLY  - TYPE AND SCREEN  - ON EPO  - DENIES RECENT HEMATEMESIS, MELENA, HEMATOCHEZIA    - S/P RECENT PRBC TRANSFUSION  - S/P PRBC TRANSFUSION     - PPI BID, CARAFATE QID    # SEVERE PROTEIN CALORIE MALNUTRITION, FAILURE TO THRIVE   -  TEMPORAL WASTING, LOSS OF MUSCLE MASS FROM SHOULDER AND HIP GIRDLE  - NUTRITIONAL SUPPLEMENT    # HLD  # PARTIALLY BLIND  # S/P LEFT BKA  # HX OF PVD  # LS SPINAL STENOSIS  # GI AND DVT PPX

## 2023-08-17 NOTE — PROGRESS NOTE ADULT - ASSESSMENT
# ESRD.  S/p  HD yesterday.  HD in AM   UF as tolerated  D/W pt compliance with HD   pre-HD Ativan   # anemia of CKD. epogen on HD. transfuse for HBG <7  #CKDMBD. cont kevin;amer  # HTN. blood pressure at goal

## 2023-08-18 NOTE — PROGRESS NOTE ADULT - PROBLEM SELECTOR PLAN 3
H/o ESRD on HD (T/Th/Sat)  - Pt refused HD on 8/5.  Completed 8 minutes on HD on 8/8.  Completed 2.5h HD on 8/9.    - Last HD 8/16.    - As discussed during family mtg, if pt's returning to [place] will need to complete full 3h sessions at dialysis.    - C/w Premedication prior to HD-Ativan 2mg PO standing predialysis   - S/p Hepatitis panel  - Nephro-Dr. Mosher, following H/o ESRD on HD (T/Th/Sat)  - Pt refused HD on 8/5.  Completed 8 minutes on HD on 8/8.  Completed 2.5h HD on 8/9.    - Last HD 8/18.    - As discussed during family mtg, if pt's returning to [place] will need to complete full 3h sessions at dialysis.    - C/w Premedication prior to HD-Ativan 2mg PO standing predialysis   - S/p Hepatitis panel  - Nephro-Dr. Mosher, following

## 2023-08-18 NOTE — PROGRESS NOTE ADULT - SUBJECTIVE AND OBJECTIVE BOX
Time of Visit:  Patient seen and examined. sitting  on side of bed , new clean shaven , more corporative and in a better mood ,     MEDICATIONS  (STANDING):  albuterol    90 MICROgram(s) HFA Inhaler 2 Puff(s) Inhalation every 6 hours  amLODIPine   Tablet 10 milliGRAM(s) Oral daily  aspirin enteric coated 81 milliGRAM(s) Oral daily  atorvastatin 40 milliGRAM(s) Oral at bedtime  budesonide 160 MICROgram(s)/formoterol 4.5 MICROgram(s) Inhaler 2 Puff(s) Inhalation two times a day  chlorhexidine 2% Cloths 1 Application(s) Topical daily  dextrose 5%. 1000 milliLiter(s) (100 mL/Hr) IV Continuous <Continuous>  dextrose 5%. 1000 milliLiter(s) (50 mL/Hr) IV Continuous <Continuous>  dextrose 50% Injectable 25 Gram(s) IV Push once  dextrose 50% Injectable 12.5 Gram(s) IV Push once  dextrose 50% Injectable 25 Gram(s) IV Push once  epoetin beverley (PROCRIT) Injectable 74081 Unit(s) IV Push <User Schedule>  epoetin beverley-epbx (RETACRIT) Injectable 78909 Unit(s) IV Push <User Schedule>  folic acid 1 milliGRAM(s) Oral daily  furosemide    Tablet 40 milliGRAM(s) Oral daily  glucagon  Injectable 1 milliGRAM(s) IntraMuscular once  hydrALAZINE 25 milliGRAM(s) Oral three times a day  lactobacillus acidophilus 1 Tablet(s) Oral two times a day with meals  latanoprost 0.005% Ophthalmic Solution 1 Drop(s) Both EYES at bedtime  levothyroxine 25 MICROGram(s) Oral daily  lidocaine   4% Patch 2 Patch Transdermal daily  LORazepam     Tablet 2 milliGRAM(s) Oral <User Schedule>  melatonin 3 milliGRAM(s) Oral at bedtime  metoprolol succinate ER 50 milliGRAM(s) Oral daily  pantoprazole    Tablet 40 milliGRAM(s) Oral before breakfast  sertraline 200 milliGRAM(s) Oral daily  sevelamer carbonate 800 milliGRAM(s) Oral three times a day with meals  sucralfate 1 Gram(s) Oral four times a day      MEDICATIONS  (PRN):  acetaminophen     Tablet .. 1000 milliGRAM(s) Oral every 8 hours PRN Moderate Pain (4 - 6)  dextrose Oral Gel 15 Gram(s) Oral once PRN Blood Glucose LESS THAN 70 milliGRAM(s)/deciliter  ondansetron   Disintegrating Tablet 4 milliGRAM(s) Oral two times a day PRN Nausea and/or Vomiting  ondansetron Injectable 4 milliGRAM(s) IV Push every 6 hours PRN Nausea and/or Vomiting       Medications up to date at time of exam.      PHYSICAL EXAMINATION:  Patient has no new complaints.  GENERAL: The patient is a well-developed, well-nourished, in no apparent distress.     Vital Signs Last 24 Hrs  T(C): 36.8 (18 Aug 2023 11:37), Max: 36.9 (18 Aug 2023 04:55)  T(F): 98.3 (18 Aug 2023 11:37), Max: 98.4 (18 Aug 2023 04:55)  HR: 79 (18 Aug 2023 11:37) (79 - 87)  BP: 120/60 (18 Aug 2023 11:37) (107/55 - 120/60)  BP(mean): 70 (18 Aug 2023 04:55) (66 - 70)  RR: 16 (18 Aug 2023 11:37) (16 - 17)  SpO2: 100% (18 Aug 2023 11:37) (100% - 100%)    Parameters below as of 18 Aug 2023 11:37  Patient On (Oxygen Delivery Method): room air       (if applicable)    Chest Tube (if applicable)    HEENT: Head is normocephalic and atraumatic. Extraocular muscles are intact. Mucous membranes are moist.     NECK: Supple, no palpable adenopathy.    LUNGS: Clear to auscultation, no wheezing, rales, or rhonchi.    HEART: Regular rate and rhythm without murmur.    ABDOMEN: Soft, nontender, and nondistended.  No hepatosplenomegaly is noted.    : No painful voiding, no pelvic pain    EXTREMITIES: L BKA     NEUROLOGIC:     SKIN: Warm, dry, good turgor.      LABS:                            RADIOLOGY & ADDITIONAL STUDIES:  EKG:   CXR:  ECHO:    IMPRESSION: 62y Male 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      Osteoarthritis      Vision loss of left eye  blind      ESRD on hemodialysis  T/Th/S      Cataract  both eyes - hx of sx done      BPH (benign prostatic hyperplasia)      UTI (urinary tract infection)  hx of      Bladder mass  hx of      H/O hematuria      Osteoporosis      Vision loss of right eye  decreased      Depression      Chronic GERD      Osteomyelitis of vertebra      CHF (congestive heart failure)      Below knee amputation status, left  2012- pt is wearing prostesis      History of right cataract extraction      History of left cataract extraction      S/P arteriovenous (AV) fistula creation  right arm brachiocephalic arteriovenous fistula on 11/08/2018      H/O hematuria  s/p bladder bx and fulguration 2/25/2020      H/O transurethral destruction of bladder lesion  2020      History of excision of mass  back mass on 03/31/2021       p/w         Impression: This is a 62 yr old man  from [facility] with ESRD on HD ( T-Th-Sat ). Current smoker . Presented to ED due to Missed Dialysis, last HD 07-22-23 . Per Patient stated that he often missed HD sessions due to Mild left leg pain and right leg swelling . S/p RRT for SOB with Hypoxia due to Acute Hypoxic Respiratory Failure secondary to Pulmonary edema/ Fluid overload due to Missed Dialysis . CT Abdomen with Small Bilateral Pleural Effusions, Rt Lower Lung with groundglass opacity. No bowel obstruction. Small Gall Stone.   Vomiting due to diabetic gastroparesis vs acute cholecystitis       Suggestions:  - Scheduled for HD today  - Neph suggest premedicate with ativan before HD   - Refusing iv access   - Limit fluid intake   - Continue O2 Supp as needed to maintain sat >90%   - Continue Duoneb via nebulization Q 6 Hours .   - Reinforced the importance of compliance to Dialysis schedule.   - Aspiration precaution with HOB elevation especially during meal times.

## 2023-08-18 NOTE — PROGRESS NOTE ADULT - ASSESSMENT
62 year old male from [facility], walks with RW, with PMH of ESRD on HD (TTS), BKA- L w/prosthetic leg, DM, Left eye blindness, CHF, CAD, HTN, HLD, osteoporosis, bronchitis, current smoker, and anemia who presents with complaint of missed dialysis and does not make any urine. Patient states he was having shortness of breath.  Admitted for Acute Hyperkalemia 2/2 missed dialysis found to have pulmonary edema and BNP [id number].  Hospital course complicated by hypoglycemia, and rapid response for AHRF.  Hospital course further c/b pt's refusal of HD, IVs, medications and bloodwork.    As discussed w/ Attending, pt needs to complete a full 3h of dialysis before d/c.      PT recommended Home PT

## 2023-08-18 NOTE — PROGRESS NOTE ADULT - SUBJECTIVE AND OBJECTIVE BOX
NP Note discussed with  primary attending    Patient is a 62y old  Male who presents with a chief complaint of Missed dialysis (18 Aug 2023 10:37)      INTERVAL HPI/OVERNIGHT EVENTS: Pt denies pain at this time.  Agreeable to HD today.  Pt stated he had to use bathroom.  NP waited and observed stool-softly formed, not liquid diarrhea.      MEDICATIONS  (STANDING):  albuterol    90 MICROgram(s) HFA Inhaler 2 Puff(s) Inhalation every 6 hours  amLODIPine   Tablet 10 milliGRAM(s) Oral daily  aspirin enteric coated 81 milliGRAM(s) Oral daily  atorvastatin 40 milliGRAM(s) Oral at bedtime  budesonide 160 MICROgram(s)/formoterol 4.5 MICROgram(s) Inhaler 2 Puff(s) Inhalation two times a day  chlorhexidine 2% Cloths 1 Application(s) Topical daily  dextrose 5%. 1000 milliLiter(s) (100 mL/Hr) IV Continuous <Continuous>  dextrose 5%. 1000 milliLiter(s) (50 mL/Hr) IV Continuous <Continuous>  dextrose 50% Injectable 25 Gram(s) IV Push once  dextrose 50% Injectable 12.5 Gram(s) IV Push once  dextrose 50% Injectable 25 Gram(s) IV Push once  epoetin beverley (PROCRIT) Injectable 44363 Unit(s) IV Push <User Schedule>  epoetin beverley-epbx (RETACRIT) Injectable 94872 Unit(s) IV Push <User Schedule>  folic acid 1 milliGRAM(s) Oral daily  furosemide    Tablet 40 milliGRAM(s) Oral daily  glucagon  Injectable 1 milliGRAM(s) IntraMuscular once  hydrALAZINE 25 milliGRAM(s) Oral three times a day  lactobacillus acidophilus 1 Tablet(s) Oral two times a day with meals  latanoprost 0.005% Ophthalmic Solution 1 Drop(s) Both EYES at bedtime  levothyroxine 25 MICROGram(s) Oral daily  lidocaine   4% Patch 2 Patch Transdermal daily  LORazepam     Tablet 2 milliGRAM(s) Oral <User Schedule>  melatonin 3 milliGRAM(s) Oral at bedtime  metoprolol succinate ER 50 milliGRAM(s) Oral daily  pantoprazole    Tablet 40 milliGRAM(s) Oral before breakfast  sertraline 200 milliGRAM(s) Oral daily  sevelamer carbonate 800 milliGRAM(s) Oral three times a day with meals  sucralfate 1 Gram(s) Oral four times a day    MEDICATIONS  (PRN):  acetaminophen     Tablet .. 1000 milliGRAM(s) Oral every 8 hours PRN Moderate Pain (4 - 6)  dextrose Oral Gel 15 Gram(s) Oral once PRN Blood Glucose LESS THAN 70 milliGRAM(s)/deciliter  ondansetron   Disintegrating Tablet 4 milliGRAM(s) Oral two times a day PRN Nausea and/or Vomiting  ondansetron Injectable 4 milliGRAM(s) IV Push every 6 hours PRN Nausea and/or Vomiting      __________________________________________________  REVIEW OF SYSTEMS:    CONSTITUTIONAL: No fever  EYES: No acute visual disturbances  NECK: No pain or stiffness  RESPIRATORY: No cough; No shortness of breath  CARDIOVASCULAR: No chest pain, no palpitations  GASTROINTESTINAL: No pain. No nausea or vomiting.  No diarrhea   NEUROLOGICAL: No headache or numbness, no tremors  MUSCULOSKELETAL: No joint pain, no muscle pain  GENITOURINARY: No dysuria, no frequency, no hesitancy  PSYCHIATRY: No depression , no anxiety  ALL OTHER  ROS negative        Vital Signs Last 24 Hrs  T(C): 36.8 (18 Aug 2023 11:37), Max: 36.9 (18 Aug 2023 04:55)  T(F): 98.3 (18 Aug 2023 11:37), Max: 98.4 (18 Aug 2023 04:55)  HR: 79 (18 Aug 2023 11:37) (79 - 87)  BP: 120/60 (18 Aug 2023 11:37) (107/55 - 120/60)  BP(mean): 70 (18 Aug 2023 04:55) (66 - 70)  RR: 16 (18 Aug 2023 11:37) (16 - 17)  SpO2: 100% (18 Aug 2023 11:37) (100% - 100%)    Parameters below as of 18 Aug 2023 11:37  Patient On (Oxygen Delivery Method): room air        ________________________________________________  PHYSICAL EXAM:  GENERAL: NAD  HEENT: Normocephalic;  conjunctivae and sclerae clear; moist mucous membranes   NECK : Supple  CHEST/LUNG: Clear to auscultation bilaterally with good air entry   HEART: S1 S2  regular; no murmurs, gallops or rubs  ABDOMEN: Soft, Nontender, Nondistended; Bowel sounds present x 4 quad  EXTREMITIES: No cyanosis; no edema; no calf tenderness.  (+) R. AVF.  (+) L. BKA.  SKIN: Warm and dry; no rash  NERVOUS SYSTEM:  Awake and alert; Oriented to place, person and time; no new deficits  _________________________________________________  LABS:              CAPILLARY BLOOD GLUCOSE      POCT Blood Glucose.: 183 mg/dL (18 Aug 2023 11:21)  POCT Blood Glucose.: 82 mg/dL (18 Aug 2023 08:06)  POCT Blood Glucose.: 93 mg/dL (17 Aug 2023 21:22)  POCT Blood Glucose.: 126 mg/dL (17 Aug 2023 16:44)        RADIOLOGY & ADDITIONAL TESTS:    Imaging Personally Reviewed:  YES    Consultant(s) Notes Reviewed:   YES    Care Discussed with Consultants :     Plan of care was discussed with patient and /or primary care giver; all questions and concerns were addressed and care was aligned with patient's wishes.

## 2023-08-18 NOTE — PROGRESS NOTE ADULT - SUBJECTIVE AND OBJECTIVE BOX
[facility] : Progress Note :: [phone], (office [phone]),   Dr Mosher / Dr Washington / Dr Young / Dr Doll / Dr Varsha TURCIOS / Dr Tello / Dr Garcia / Dr Larry qiu  _____________________________________________________________________________________________    awaits HD today    No Known Drug Allergies  fish (Rash)  liver (Anaphylaxis)    Hospital Medications:   MEDICATIONS  (STANDING):  albuterol    90 MICROgram(s) HFA Inhaler 2 Puff(s) Inhalation every 6 hours  amLODIPine   Tablet 10 milliGRAM(s) Oral daily  aspirin enteric coated 81 milliGRAM(s) Oral daily  atorvastatin 40 milliGRAM(s) Oral at bedtime  budesonide 160 MICROgram(s)/formoterol 4.5 MICROgram(s) Inhaler 2 Puff(s) Inhalation two times a day  chlorhexidine 2% Cloths 1 Application(s) Topical daily  dextrose 5%. 1000 milliLiter(s) (100 mL/Hr) IV Continuous <Continuous>  dextrose 5%. 1000 milliLiter(s) (50 mL/Hr) IV Continuous <Continuous>  dextrose 50% Injectable 25 Gram(s) IV Push once  dextrose 50% Injectable 12.5 Gram(s) IV Push once  dextrose 50% Injectable 25 Gram(s) IV Push once  epoetin beverley (PROCRIT) Injectable 66546 Unit(s) IV Push <User Schedule>  epoetin beverley-epbx (RETACRIT) Injectable 01204 Unit(s) IV Push <User Schedule>  folic acid 1 milliGRAM(s) Oral daily  furosemide    Tablet 40 milliGRAM(s) Oral daily  glucagon  Injectable 1 milliGRAM(s) IntraMuscular once  hydrALAZINE 25 milliGRAM(s) Oral three times a day  lactobacillus acidophilus 1 Tablet(s) Oral two times a day with meals  latanoprost 0.005% Ophthalmic Solution 1 Drop(s) Both EYES at bedtime  levothyroxine 25 MICROGram(s) Oral daily  lidocaine   4% Patch 2 Patch Transdermal daily  LORazepam     Tablet 2 milliGRAM(s) Oral <User Schedule>  melatonin 3 milliGRAM(s) Oral at bedtime  metoprolol succinate ER 50 milliGRAM(s) Oral daily  pantoprazole    Tablet 40 milliGRAM(s) Oral before breakfast  sertraline 200 milliGRAM(s) Oral daily  sevelamer carbonate 800 milliGRAM(s) Oral three times a day with meals  sucralfate 1 Gram(s) Oral four times a day        VITALS:  T(F): 98.3 (08-18-23 @ 11:37), Max: 98.4 (08-18-23 @ 04:55)  HR: 79 (08-18-23 @ 11:37)  BP: 120/60 (08-18-23 @ 11:37)  RR: 16 (08-18-23 @ 11:37)  SpO2: 100% (08-18-23 @ 11:37)  Wt(kg): --      PHYSICAL EXAM:  Constitutional: NAD  HEENT: anicteric sclera, oropharynx clear,  Neck: No JVD  Respiratory: CTAB, no wheezes, rales or rhonchi  Cardiovascular: S1, S2, RRR  Gastrointestinal: BS+, soft, NT/ND  Neurological: A/O x 3,   : No CVA tenderness. No [place].   Skin: No rashes  Vascular Access: AVF with thrill and bruit    LABS:        Creatinine Trend: 10.70 <--, 11.70 <--    Urine Studies:  Urinalysis Basic - ( 15 Aug 2023 12:21 )    Color:  / Appearance:  / SG:  / pH:   Gluc: 102 mg/dL / Ketone:   / Bili:  / Urobili:    Blood:  / Protein:  / Nitrite:    Leuk Esterase:  / RBC:  / WBC    Sq Epi:  / Non Sq Epi:  / Bacteria:         RADIOLOGY & ADDITIONAL STUDIES:

## 2023-08-18 NOTE — PROGRESS NOTE ADULT - ASSESSMENT
# ESRD.  HD today  UF as tolerated  D/W pt compliance with HD   pre-HD Ativan   # anemia of CKD. epogen on HD. transfuse for HBG <7  #CKDMBD. cont sevelamer  # HTN. blood pressure at goal

## 2023-08-18 NOTE — PROGRESS NOTE ADULT - SUBJECTIVE AND OBJECTIVE BOX
Patient is a 62y old  Male who presents with a chief complaint of Missed dialysis (17 Aug 2023 14:33)    PATIENT IS SEEN AND EXAMINED IN MEDICAL FLOOR.  MARIIT [    ]    JEREMY [   ]      GT [   ]    ALLERGIES:  No Known Drug Allergies  fish (Rash)  liver (Anaphylaxis)      Daily     Daily     VITALS:    Vital Signs Last 24 Hrs  T(C): 36.9 (18 Aug 2023 04:55), Max: 36.9 (18 Aug 2023 04:55)  T(F): 98.4 (18 Aug 2023 04:55), Max: 98.4 (18 Aug 2023 04:55)  HR: 83 (18 Aug 2023 04:55) (75 - 87)  BP: 112/58 (18 Aug 2023 04:55) (107/55 - 140/73)  BP(mean): 70 (18 Aug 2023 04:55) (66 - 70)  RR: 16 (18 Aug 2023 04:55) (16 - 17)  SpO2: 100% (18 Aug 2023 04:55) (100% - 100%)    Parameters below as of 18 Aug 2023 04:55  Patient On (Oxygen Delivery Method): nasal cannula  O2 Flow (L/min): 0.1      LABS:              CAPILLARY BLOOD GLUCOSE      POCT Blood Glucose.: 82 mg/dL (18 Aug 2023 08:06)  POCT Blood Glucose.: 93 mg/dL (17 Aug 2023 21:22)  POCT Blood Glucose.: 126 mg/dL (17 Aug 2023 16:44)  POCT Blood Glucose.: 135 mg/dL (17 Aug 2023 11:35)          Creatinine Trend: 10.70<--, 11.70<--, 12.90<--, 11.70<--, 17.50<--, 17.40<--  I&O's Summary          .Blood Blood-Peripheral  05-28 @ 14:07   No Growth Final  --  --          MEDICATIONS:    MEDICATIONS  (STANDING):  albuterol    90 MICROgram(s) HFA Inhaler 2 Puff(s) Inhalation every 6 hours  amLODIPine   Tablet 10 milliGRAM(s) Oral daily  aspirin enteric coated 81 milliGRAM(s) Oral daily  atorvastatin 40 milliGRAM(s) Oral at bedtime  budesonide 160 MICROgram(s)/formoterol 4.5 MICROgram(s) Inhaler 2 Puff(s) Inhalation two times a day  chlorhexidine 2% Cloths 1 Application(s) Topical daily  dextrose 5%. 1000 milliLiter(s) (100 mL/Hr) IV Continuous <Continuous>  dextrose 5%. 1000 milliLiter(s) (50 mL/Hr) IV Continuous <Continuous>  dextrose 50% Injectable 25 Gram(s) IV Push once  dextrose 50% Injectable 12.5 Gram(s) IV Push once  dextrose 50% Injectable 25 Gram(s) IV Push once  epoetin beverley (PROCRIT) Injectable 23054 Unit(s) IV Push <User Schedule>  epoetin beverley-epbx (RETACRIT) Injectable 27928 Unit(s) IV Push <User Schedule>  folic acid 1 milliGRAM(s) Oral daily  furosemide    Tablet 40 milliGRAM(s) Oral daily  glucagon  Injectable 1 milliGRAM(s) IntraMuscular once  hydrALAZINE 25 milliGRAM(s) Oral three times a day  lactobacillus acidophilus 1 Tablet(s) Oral two times a day with meals  latanoprost 0.005% Ophthalmic Solution 1 Drop(s) Both EYES at bedtime  levothyroxine 25 MICROGram(s) Oral daily  lidocaine   4% Patch 2 Patch Transdermal daily  LORazepam     Tablet 2 milliGRAM(s) Oral <User Schedule>  melatonin 3 milliGRAM(s) Oral at bedtime  metoprolol succinate ER 50 milliGRAM(s) Oral daily  pantoprazole    Tablet 40 milliGRAM(s) Oral before breakfast  sertraline 200 milliGRAM(s) Oral daily  sevelamer carbonate 800 milliGRAM(s) Oral three times a day with meals  sucralfate 1 Gram(s) Oral four times a day      MEDICATIONS  (PRN):  acetaminophen     Tablet .. 1000 milliGRAM(s) Oral every 8 hours PRN Moderate Pain (4 - 6)  dextrose Oral Gel 15 Gram(s) Oral once PRN Blood Glucose LESS THAN 70 milliGRAM(s)/deciliter  ondansetron   Disintegrating Tablet 4 milliGRAM(s) Oral two times a day PRN Nausea and/or Vomiting  ondansetron Injectable 4 milliGRAM(s) IV Push every 6 hours PRN Nausea and/or Vomiting      REVIEW OF SYSTEMS:                           ALL ROS DONE [ X   ]    CONSTITUTIONAL:  LETHARGIC [   ], FEVER [   ], UNRESPONSIVE [   ]  CVS:  CP  [   ], SOB, [   ], PALPITATIONS [   ], DIZZYNESS [   ]  RS: COUGH [   ], SPUTUM [   ]  GI: ABDOMINAL PAIN [   ], NAUSEA [   ], VOMITINGS [   ], DIARRHEA [   ], CONSTIPATION [   ]  :  DYSURIA [   ], NOCTURIA [   ], INCREASED FREQUENCY [   ], DRIBLING [   ],  SKELETAL: PAINFUL JOINTS [   ], SWOLLEN JOINTS [   ], NECK ACHE [   ], LOW BACK ACHE [   ],  SKIN : ULCERS [   ], RASH [   ], ITCHING [   ]  CNS: HEAD ACHE [   ], DOUBLE VISION [   ], BLURRED VISION [   ], AMS / CONFUSION [   ], SEIZURES [   ], WEAKNESS [   ],TINGLING / NUMBNESS [   ]        GENERAL APPEARANCE: NO DISTRESS,    ANASARCA ++  HEENT:  NO PALLOR, NO  JVD,  NO   NODES, NECK SUPPLE  CVS: S1 +, S2 +,   RS: AEEB,  OCCASIONAL  RALES +,   CRACKLES+ AT LUNG BASES [IMPROVED]  ABD: SOFT, NT, NO, BS +  EXT: LEFT BKA  SKIN: WARM,   SKELETAL:  ROM ACCEPTABLE  CNS:  AAO X  2-3        RADIOLOGY :    RADIOLOGY AND READINGS REVIEWED          ASSESSMENT :     Hypervolemia    Hypertension    Adrenal insufficiency    CKD (chronic kidney disease)    Anemia    Glaucoma    Coronary artery disease    HLD (hyperlipidemia)    Peripheral vascular disease    Spinal stenosis of lumbosacral region    Hyperparathyroidism    Diabetes mellitus    Diabetic neuropathy    Contracture of hand    Osteoarthritis    Osteoporosis    Vision loss of left eye    ESRD on hemodialysis    Cataract    BPH (benign prostatic hyperplasia)    UTI (urinary tract infection)    Bladder mass    H/O hematuria    Osteoporosis    Vision loss of right eye    Depression    Chronic GERD    Osteomyelitis of vertebra    CHF (congestive heart failure)    Below knee amputation status, left    History of right cataract extraction    History of left cataract extraction    S/P arteriovenous (AV) fistula creation    H/O hematuria    H/O transurethral destruction of bladder lesion    History of excision of mass        PLAN:  HPI:  Patient is 62 year old male from [facility], walks with RW, with PMH of ESRD on HD (TTS), BKA- L w/prosthetic leg, DM, Left eye blindness, CHF, CAD, HTN, HLD, osteoporosis, bronchitis, current smoker, and anemia who presents with complaint of missed dialysis. Last HD 7/22. Pt states he often missed HD sessions.  patient states he missed this HD session due to mild pain in left leg, patient remains able to ambulate. Pt complains of right leg swelling and states he does not make any urine. Patient states he was having mild shortness of breath.  Pt denies any fever, chills, N/V/D, constipation, CP, numbness or tingling (26 Jul 2023 19:20)    # [8/9] MEETING HELD WITH PATIENT, BROTHER ARTEMIO @ [phone] AND SW TEAM. PATIENT W/ HISTORY OF ROUTINE NONCOMPLIANCE W/ LIFE-SUSTAINING TREATMENT [HD], EVALUATIONS AND INTERVENTIONS - COUNSELLED AT LENGTH TO REMAIN COMPLIANT. DISCUSSED THAT PROGNOSIS IS POOR/GRIM GIVEN UNDERLYING MEDICAL CONDITIONS AND GIVEN NONCOMPLIANCE. HE VERBALIZED UNDERSTANDING. REGARDING GOC - PATIENT WISHES FOR FULL CODE. PALLIATIVE CARE CONSULTED. PSYCHIATRY CONSULTED. PATIENT EXPRESSED THAT HE DOES GROW IMPATIENT DURING DIALYSIS SESSIONS AND HAS BEEN LEAVING SESSIONS SOONER THAN PRESCRIBED. IN DISCUSSION THAT RISKS OF DEFERRING COMPLETE HD - INCLUDE BUT ARE NOT LIMITED TO WORSENING CLINICAL CONDITION, ELECTROLYTE ABNORMALITIES, ARRHYTHMIA AND DEATH. HE VERBALIZED UNDERSTANDING. D/W PATIENT THAT REPEATEDLY REFUSING INTERVENTIONS AND ASSESSMENTS IS NOT IN LINE WITH HIS WISHES TO IMPROVE. PATIENT VERBALIZED UNDERSTANDING. DISCUSSED REGARDING CURRENT CLINICAL CONDITION, RECOMMENDED EVALUATIONS AND INTERVENTIONS. ALL QUESTIONS ANSWERED. TEAM HAS OFFERED PATIENT EMOTIONAL SUPPORT AND OFFERED MEDICATION FOR MOOD. OFFERED TO PRE-MEDICATE W/ ANXIOLYTIC PRIOR TO HD. PATIENT IS AGREEABLE.     DISCUSSED THAT PATIENT WILL NEED TO COMPLY WITH HD SESSIONS IN ORDER TO BE MEDICALLY OPTIMIZED FOR DISCHARGE AND FOR SAFE D/C PLANNING. TEAM DISCUSSED OPTIONS OF RETURNING TO [facility] W/ OUTPATIENT HD , IF CARE NEEDS CAN BE SAFELY MET VS. NURSING HOME/[facility] [place] . PATIENT AND BROTHER VERBALIZED UNDERSTANDING.     - SW AND MEDICAL TEAM HAVING ONGOING DISCUSSION WITH PATIENT REGARDING CONSISTENT COMPLIANCE IN ORDER TO RETURN TO HD IN THE OUTPATIENT SETTING       # ACUTE ON CHRONIC HYPOXIC RESPIRATORY FAILURE S/T PULMONARY EDEMA - S/T INCOMPLETE HD SESSIONS ; ANASARCA  # HYPERKALEMIA  # HX OF COPD + S/P RECENT MULTIFOCAL PNEUMONIA ; R/O ASPIRATION PNEUMONIA  # PULMONARY HYPERTENSION  # UNCONTROLLED HTN  # ESRD ON HD TTS - W/ HX OF NONCOMPLIANCE    - TELEMETRY  - HYDRALAZINE, METOPROLOL AND NORVASC ; PRN IV ANTIHYPERTENSIVE  - LOKELMA  - SUPPLEMENTAL O2  - PLANNED FOR HD  - NEPHROLOGY CONSULT  - PULMONOLOGY CONSULT  - ID CONSULT    - CONTINUES TO REFUSE OR PRE-MATURELY DISCONTINUE SESSIONS OF HD       - [7/30] - OVERNIGHT RAPID RESPONSE S/T HYPOXIA - SELF-LIMITED - PATIENT IMPROVED S/P O2 SUPPLEMENT  - S/P CEFEPIME + FLAGYLL, BCX - NGTD      # RECURRENT INTRACTABLE NAUSEA/VOMITING, ? COFFEE GROUND EMESIS  # GASTROPARESIS  # HISTORY OF ESOPHAGITIS AND DUODENITIS [1/2021], HX OF GASTROPARESIS W/ EVIDENCE OF THICKENED ESOPHAGUS ON PREVIOUS CT SCAN   # PANCREAS W/ DOUBLE DUCT SIGN    - MONITORING HGB, PLACED ON PPI BID , CARAFATE QID  - PRN ANTIEMETICS  ; S/P DOSE OF REGLAN [WITH MINIMAL IMPROVEMENT]  - MONITORING FOR SYMPTOMS  - WHILE ON DIET PATIENT REPEATEDLY COUNSELLED TO CHEW FOOD CAUTIOUSLY AND CONSUME SMALL BITES W/ REGULAR CLEARING WITH WATER BETWEEN BITES    - ADVANCE DIET AS TOLERATED  - NOTED CT A/P    - S/P RECENT PRBC TRANSFUSION     - GI CONSULT  - SURGERY CONSULT    - [8/14] - EPISODE OF NAUSEA/VOMITING OVER THE WEEKEND - IMPROVED    # LESS LIKELY CHOLECYSTITIS  - S/P ABX  - NOTED CT A/P  - HIDA SCAN - NEGATIVE  - SURGERY CONSULT    # ELEVATED TROPONINS - ? DEMAND ISCHEMIA    - S/P TELEMETRY  - TREND TROPONINS  - ECHO - TRACE MR, MODERATELY INCREASED LV WALL THICKNESS, NORMAL LV SYSTOLIC FUNCTION, SEVERE PULMONARY HTN  - CARDIOLOGY CONSULT    # EPISODE OF HYPOGLYCEMIA - IMPROVED  # GASTROPARESIS  # UNDERLYING DM  - SSI + FS  - COUNSELLED TO COMPLY WITH HD TO REDUCE UREMIA    - REPEATEDLY COUNSELLED TO COMPLY WITH FINGERSTICKS      # DEPRESSION  - PLACED ON ZOLOFT  - PSYCHIATRY CONSULT    # PATIENT UNDERGOING OUTPATIENT WORKUP FOR CENTRAL VENOUS STENOSIS THROUGH NEPHROLOGY TEAM  - ? s/p STENT PLACEMENT    # ANEMIA OF CKD  # HX OF PANCYTOPENIA   - TREND HGB, TRANSFUSION THRESHOLD HGB < 7; PATIENT STILL INTERMITTENTLY REFUSING BLOODWORK, COUNSELLED TO COMPLY  - TYPE AND SCREEN  - ON EPO  - DENIES RECENT HEMATEMESIS, MELENA, HEMATOCHEZIA    - S/P RECENT PRBC TRANSFUSION  - S/P PRBC TRANSFUSION 7/29    - PPI BID, CARAFATE QID    # SEVERE PROTEIN CALORIE MALNUTRITION, FAILURE TO THRIVE   -  TEMPORAL WASTING, LOSS OF MUSCLE MASS FROM SHOULDER AND HIP GIRDLE  - NUTRITIONAL SUPPLEMENT    # HLD  # PARTIALLY BLIND  # S/P LEFT BKA  # HX OF PVD  # LS SPINAL STENOSIS  # GI AND DVT PPX   Patient is a 62y old  Male who presents with a chief complaint of Missed dialysis (17 Aug 2023 14:33)    PATIENT IS SEEN AND EXAMINED IN MEDICAL FLOOR.      ALLERGIES:  No Known Drug Allergies  fish (Rash)  liver (Anaphylaxis)        VITALS:    Vital Signs Last 24 Hrs  T(C): 36.9 (18 Aug 2023 04:55), Max: 36.9 (18 Aug 2023 04:55)  T(F): 98.4 (18 Aug 2023 04:55), Max: 98.4 (18 Aug 2023 04:55)  HR: 83 (18 Aug 2023 04:55) (75 - 87)  BP: 112/58 (18 Aug 2023 04:55) (107/55 - 140/73)  BP(mean): 70 (18 Aug 2023 04:55) (66 - 70)  RR: 16 (18 Aug 2023 04:55) (16 - 17)  SpO2: 100% (18 Aug 2023 04:55) (100% - 100%)    Parameters below as of 18 Aug 2023 04:55  Patient On (Oxygen Delivery Method): nasal cannula  O2 Flow (L/min): 0.1      LABS:        CAPILLARY BLOOD GLUCOSE      POCT Blood Glucose.: 82 mg/dL (18 Aug 2023 08:06)  POCT Blood Glucose.: 93 mg/dL (17 Aug 2023 21:22)  POCT Blood Glucose.: 126 mg/dL (17 Aug 2023 16:44)  POCT Blood Glucose.: 135 mg/dL (17 Aug 2023 11:35)          Creatinine Trend: 10.70<--, 11.70<--, 12.90<--, 11.70<--, 17.50<--, 17.40<--  I&O's Summary          .Blood Blood-Peripheral  05-28 @ 14:07   No Growth Final  --  --          MEDICATIONS:    MEDICATIONS  (STANDING):  albuterol    90 MICROgram(s) HFA Inhaler 2 Puff(s) Inhalation every 6 hours  amLODIPine   Tablet 10 milliGRAM(s) Oral daily  aspirin enteric coated 81 milliGRAM(s) Oral daily  atorvastatin 40 milliGRAM(s) Oral at bedtime  budesonide 160 MICROgram(s)/formoterol 4.5 MICROgram(s) Inhaler 2 Puff(s) Inhalation two times a day  chlorhexidine 2% Cloths 1 Application(s) Topical daily  dextrose 5%. 1000 milliLiter(s) (100 mL/Hr) IV Continuous <Continuous>  dextrose 5%. 1000 milliLiter(s) (50 mL/Hr) IV Continuous <Continuous>  dextrose 50% Injectable 25 Gram(s) IV Push once  dextrose 50% Injectable 12.5 Gram(s) IV Push once  dextrose 50% Injectable 25 Gram(s) IV Push once  epoetin beverley (PROCRIT) Injectable 46990 Unit(s) IV Push <User Schedule>  epoetin beverley-epbx (RETACRIT) Injectable 13336 Unit(s) IV Push <User Schedule>  folic acid 1 milliGRAM(s) Oral daily  furosemide    Tablet 40 milliGRAM(s) Oral daily  glucagon  Injectable 1 milliGRAM(s) IntraMuscular once  hydrALAZINE 25 milliGRAM(s) Oral three times a day  lactobacillus acidophilus 1 Tablet(s) Oral two times a day with meals  latanoprost 0.005% Ophthalmic Solution 1 Drop(s) Both EYES at bedtime  levothyroxine 25 MICROGram(s) Oral daily  lidocaine   4% Patch 2 Patch Transdermal daily  LORazepam     Tablet 2 milliGRAM(s) Oral <User Schedule>  melatonin 3 milliGRAM(s) Oral at bedtime  metoprolol succinate ER 50 milliGRAM(s) Oral daily  pantoprazole    Tablet 40 milliGRAM(s) Oral before breakfast  sertraline 200 milliGRAM(s) Oral daily  sevelamer carbonate 800 milliGRAM(s) Oral three times a day with meals  sucralfate 1 Gram(s) Oral four times a day      MEDICATIONS  (PRN):  acetaminophen     Tablet .. 1000 milliGRAM(s) Oral every 8 hours PRN Moderate Pain (4 - 6)  dextrose Oral Gel 15 Gram(s) Oral once PRN Blood Glucose LESS THAN 70 milliGRAM(s)/deciliter  ondansetron   Disintegrating Tablet 4 milliGRAM(s) Oral two times a day PRN Nausea and/or Vomiting  ondansetron Injectable 4 milliGRAM(s) IV Push every 6 hours PRN Nausea and/or Vomiting      REVIEW OF SYSTEMS:                           ALL ROS DONE [ X   ]    CONSTITUTIONAL:  LETHARGIC [   ], FEVER [   ], UNRESPONSIVE [   ]  CVS:  CP  [   ], SOB, [   ], PALPITATIONS [   ], DIZZYNESS [   ]  RS: COUGH [   ], SPUTUM [   ]  GI: ABDOMINAL PAIN [   ], NAUSEA [   ], VOMITINGS [   ], DIARRHEA [   ], CONSTIPATION [   ]  :  DYSURIA [   ], NOCTURIA [   ], INCREASED FREQUENCY [   ], DRIBLING [   ],  SKELETAL: PAINFUL JOINTS [   ], SWOLLEN JOINTS [   ], NECK ACHE [   ], LOW BACK ACHE [   ],  SKIN : ULCERS [   ], RASH [   ], ITCHING [   ]  CNS: HEAD ACHE [   ], DOUBLE VISION [   ], BLURRED VISION [   ], AMS / CONFUSION [   ], SEIZURES [   ], WEAKNESS [   ],TINGLING / NUMBNESS [   ]        GENERAL APPEARANCE: NO DISTRESS,    ANASARCA ++ [IMPROVED]  HEENT:  NO PALLOR, NO  JVD,  NO   NODES, NECK SUPPLE  CVS: S1 +, S2 +,   RS: AEEB,  OCCASIONAL  RALES +,   CRACKLES+ AT LUNG BASES [IMPROVED]  ABD: SOFT, NT, NO, BS +  EXT: LEFT BKA  SKIN: WARM,   SKELETAL:  ROM ACCEPTABLE  CNS:  AAO X  2-3        RADIOLOGY :    RADIOLOGY AND READINGS REVIEWED          ASSESSMENT :     Hypervolemia    Hypertension    Adrenal insufficiency    CKD (chronic kidney disease)    Anemia    Glaucoma    Coronary artery disease    HLD (hyperlipidemia)    Peripheral vascular disease    Spinal stenosis of lumbosacral region    Hyperparathyroidism    Diabetes mellitus    Diabetic neuropathy    Contracture of hand    Osteoarthritis    Osteoporosis    Vision loss of left eye    ESRD on hemodialysis    Cataract    BPH (benign prostatic hyperplasia)    UTI (urinary tract infection)    Bladder mass    H/O hematuria    Osteoporosis    Vision loss of right eye    Depression    Chronic GERD    Osteomyelitis of vertebra    CHF (congestive heart failure)    Below knee amputation status, left    History of right cataract extraction    History of left cataract extraction    S/P arteriovenous (AV) fistula creation    H/O hematuria    H/O transurethral destruction of bladder lesion    History of excision of mass        PLAN:  HPI:  Patient is 62 year old male from [facility], walks with RW, with PMH of ESRD on HD (TTS), BKA- L w/prosthetic leg, DM, Left eye blindness, CHF, CAD, HTN, HLD, osteoporosis, bronchitis, current smoker, and anemia who presents with complaint of missed dialysis. Last HD 7/22. Pt states he often missed HD sessions.  patient states he missed this HD session due to mild pain in left leg, patient remains able to ambulate. Pt complains of right leg swelling and states he does not make any urine. Patient states he was having mild shortness of breath.  Pt denies any fever, chills, N/V/D, constipation, CP, numbness or tingling (26 Jul 2023 19:20)    # [8/9] MEETING HELD WITH PATIENT, BROTHER ARTEMIO @ [phone] AND SW TEAM. PATIENT W/ HISTORY OF ROUTINE NONCOMPLIANCE W/ LIFE-SUSTAINING TREATMENT [HD], EVALUATIONS AND INTERVENTIONS - COUNSELLED AT LENGTH TO REMAIN COMPLIANT. DISCUSSED THAT PROGNOSIS IS POOR/GRIM GIVEN UNDERLYING MEDICAL CONDITIONS AND GIVEN NONCOMPLIANCE. HE VERBALIZED UNDERSTANDING. REGARDING GOC - PATIENT WISHES FOR FULL CODE. PALLIATIVE CARE CONSULTED. PSYCHIATRY CONSULTED. PATIENT EXPRESSED THAT HE DOES GROW IMPATIENT DURING DIALYSIS SESSIONS AND HAS BEEN LEAVING SESSIONS SOONER THAN PRESCRIBED. IN DISCUSSION THAT RISKS OF DEFERRING COMPLETE HD - INCLUDE BUT ARE NOT LIMITED TO WORSENING CLINICAL CONDITION, ELECTROLYTE ABNORMALITIES, ARRHYTHMIA AND DEATH. HE VERBALIZED UNDERSTANDING. D/W PATIENT THAT REPEATEDLY REFUSING INTERVENTIONS AND ASSESSMENTS IS NOT IN LINE WITH HIS WISHES TO IMPROVE. PATIENT VERBALIZED UNDERSTANDING. DISCUSSED REGARDING CURRENT CLINICAL CONDITION, RECOMMENDED EVALUATIONS AND INTERVENTIONS. ALL QUESTIONS ANSWERED. TEAM HAS OFFERED PATIENT EMOTIONAL SUPPORT AND OFFERED MEDICATION FOR MOOD. OFFERED TO PRE-MEDICATE W/ ANXIOLYTIC PRIOR TO HD. PATIENT IS AGREEABLE.     DISCUSSED THAT PATIENT WILL NEED TO COMPLY WITH HD SESSIONS IN ORDER TO BE MEDICALLY OPTIMIZED FOR DISCHARGE AND FOR SAFE D/C PLANNING. TEAM DISCUSSED OPTIONS OF RETURNING TO [facility] W/ OUTPATIENT HD , IF CARE NEEDS CAN BE SAFELY MET VS. NURSING HOME/[facility] . PATIENT AND BROTHER VERBALIZED UNDERSTANDING.     - SW AND MEDICAL TEAM HAVING ONGOING DISCUSSION WITH PATIENT REGARDING CONSISTENT COMPLIANCE IN ORDER TO RETURN TO HD IN THE OUTPATIENT SETTING  -- CONVEYED THAT PATIENT NEEDS TO TEAM THAT PATIENT WILL NEED TO COMPLETE FULL HD SESSION IN ORDER TO BE CONSIDERED BY OUTPATIENT HD AT NH W/ ONSITE HD OR OUTSIDE HD FACILITY      # ACUTE ON CHRONIC HYPOXIC RESPIRATORY FAILURE S/T PULMONARY EDEMA - S/T INCOMPLETE HD SESSIONS ; ANASARCA  # HYPERKALEMIA  # HX OF COPD + S/P RECENT MULTIFOCAL PNEUMONIA ; R/O ASPIRATION PNEUMONIA  # PULMONARY HYPERTENSION  # UNCONTROLLED HTN  # ESRD ON HD TTS - W/ HX OF NONCOMPLIANCE    - TELEMETRY  - HYDRALAZINE, METOPROLOL AND NORVASC ; PRN IV ANTIHYPERTENSIVE  - LOKELMA  - SUPPLEMENTAL O2  - PLANNED FOR HD  - NEPHROLOGY CONSULT  - PULMONOLOGY CONSULT  - ID CONSULT    - CONTINUES TO REFUSE OR PRE-MATURELY DISCONTINUE SESSIONS OF HD       - [7/30] - OVERNIGHT RAPID RESPONSE S/T HYPOXIA - SELF-LIMITED - PATIENT IMPROVED S/P O2 SUPPLEMENT  - S/P CEFEPIME + FLAGYLL, BCX - NGTD      # RECURRENT INTRACTABLE NAUSEA/VOMITING, ? COFFEE GROUND EMESIS  # GASTROPARESIS  # HISTORY OF ESOPHAGITIS AND DUODENITIS [1/2021], HX OF GASTROPARESIS W/ EVIDENCE OF THICKENED ESOPHAGUS ON PREVIOUS CT SCAN   # PANCREAS W/ DOUBLE DUCT SIGN    - MONITORING HGB, PLACED ON PPI BID , CARAFATE QID  - PRN ANTIEMETICS  ; S/P DOSE OF REGLAN [WITH MINIMAL IMPROVEMENT]  - MONITORING FOR SYMPTOMS  - WHILE ON DIET PATIENT REPEATEDLY COUNSELLED TO CHEW FOOD CAUTIOUSLY AND CONSUME SMALL BITES W/ REGULAR CLEARING WITH WATER BETWEEN BITES    - ADVANCE DIET AS TOLERATED  - NOTED CT A/P    - S/P RECENT PRBC TRANSFUSION     - GI CONSULT  - SURGERY CONSULT    - [8/14] - EPISODE OF NAUSEA/VOMITING OVER THE WEEKEND - IMPROVED    # LESS LIKELY CHOLECYSTITIS  - S/P ABX  - NOTED CT A/P  - HIDA SCAN - NEGATIVE  - SURGERY CONSULT    # ELEVATED TROPONINS - ? DEMAND ISCHEMIA    - S/P TELEMETRY  - TREND TROPONINS  - ECHO - TRACE MR, MODERATELY INCREASED LV WALL THICKNESS, NORMAL LV SYSTOLIC FUNCTION, SEVERE PULMONARY HTN  - CARDIOLOGY CONSULT    # EPISODE OF HYPOGLYCEMIA - IMPROVED  # GASTROPARESIS  # UNDERLYING DM  - SSI + FS  - COUNSELLED TO COMPLY WITH HD TO REDUCE UREMIA    - REPEATEDLY COUNSELLED TO COMPLY WITH FINGERSTICKS      # DEPRESSION  - PLACED ON ZOLOFT  - PSYCHIATRY CONSULT    # PATIENT UNDERGOING OUTPATIENT WORKUP FOR CENTRAL VENOUS STENOSIS THROUGH NEPHROLOGY TEAM  - ? s/p STENT PLACEMENT    # ANEMIA OF CKD  # HX OF PANCYTOPENIA   - TREND HGB, TRANSFUSION THRESHOLD HGB < 7; PATIENT STILL INTERMITTENTLY REFUSING BLOODWORK, COUNSELLED TO COMPLY  - TYPE AND SCREEN  - ON EPO  - DENIES RECENT HEMATEMESIS, MELENA, HEMATOCHEZIA    - S/P RECENT PRBC TRANSFUSION  - S/P PRBC TRANSFUSION 7/29    - PPI BID, CARAFATE QID    # SEVERE PROTEIN CALORIE MALNUTRITION, FAILURE TO THRIVE   -  TEMPORAL WASTING, LOSS OF MUSCLE MASS FROM SHOULDER AND HIP GIRDLE  - NUTRITIONAL SUPPLEMENT    # HLD  # PARTIALLY BLIND  # S/P LEFT BKA  # HX OF PVD  # LS SPINAL STENOSIS  # GI AND DVT PPX

## 2023-08-19 NOTE — PROGRESS NOTE ADULT - SUBJECTIVE AND OBJECTIVE BOX
Patient is a 62y old  Male who presents with a chief complaint of Missed dialysis (19 Aug 2023 10:48)    PATIENT IS SEEN AND EXAMINED IN MEDICAL FLOOR.  SULTANA [    ]    JEREMY [   ]      GT [   ]    ALLERGIES:  No Known Drug Allergies  fish (Rash)  liver (Anaphylaxis)      Daily     Daily Weight in k (19 Aug 2023 05:09)    VITALS:    Vital Signs Last 24 Hrs  T(C): 36.7 (19 Aug 2023 05:09), Max: 36.8 (18 Aug 2023 11:37)  T(F): 98.1 (19 Aug 2023 05:09), Max: 98.3 (18 Aug 2023 11:37)  HR: 89 (19 Aug 2023 05:09) (76 - 89)  BP: 131/66 (19 Aug 2023 05:09) (111/60 - 131/66)  BP(mean): --  RR: 18 (19 Aug 2023 05:09) (16 - 18)  SpO2: 100% (19 Aug 2023 05:09) (100% - 100%)    Parameters below as of 19 Aug 2023 05:09  Patient On (Oxygen Delivery Method): nasal cannula  O2 Flow (L/min): 2      LABS:    CBC Full  -  ( 18 Aug 2023 16:10 )  WBC Count : 2.70 K/uL  RBC Count : 3.48 M/uL  Hemoglobin : 9.8 g/dL  Hematocrit : 31.6 %  Platelet Count - Automated : 100 K/uL  Mean Cell Volume : 90.8 fl  Mean Cell Hemoglobin : 28.2 pg  Mean Cell Hemoglobin Concentration : 31.0 gm/dL  Auto Neutrophil # : 2.16 K/uL  Auto Lymphocyte # : 0.25 K/uL  Auto Monocyte # : 0.19 K/uL  Auto Eosinophil # : 0.07 K/uL  Auto Basophil # : 0.02 K/uL  Auto Neutrophil % : 80.0 %  Auto Lymphocyte % : 9.3 %  Auto Monocyte % : 7.0 %  Auto Eosinophil % : 2.6 %  Auto Basophil % : 0.7 %      0818    128<L>  |  103  |  37<H>  ----------------------------<  114<H>  4.8   |  15<L>  |  12.30<H>    Ca    7.7<L>      18 Aug 2023 16:10      CAPILLARY BLOOD GLUCOSE      POCT Blood Glucose.: 91 mg/dL (18 Aug 2023 17:15)  POCT Blood Glucose.: 183 mg/dL (18 Aug 2023 11:21)          Creatinine Trend: 12.30<--, 10.70<--, 11.70<--, 12.90<--, 11.70<--, 17.50<--  I&O's Summary    18 Aug 2023 07:01  -  19 Aug 2023 07:00  --------------------------------------------------------  IN: 600 mL / OUT: 1310 mL / NET: -710 mL            .Blood Blood-Peripheral  - @ 14:07   No Growth Final  --  --          MEDICATIONS:    MEDICATIONS  (STANDING):  albuterol    90 MICROgram(s) HFA Inhaler 2 Puff(s) Inhalation every 6 hours  amLODIPine   Tablet 10 milliGRAM(s) Oral daily  aspirin enteric coated 81 milliGRAM(s) Oral daily  atorvastatin 40 milliGRAM(s) Oral at bedtime  budesonide 160 MICROgram(s)/formoterol 4.5 MICROgram(s) Inhaler 2 Puff(s) Inhalation two times a day  chlorhexidine 2% Cloths 1 Application(s) Topical daily  dextrose 5%. 1000 milliLiter(s) (50 mL/Hr) IV Continuous <Continuous>  dextrose 5%. 1000 milliLiter(s) (100 mL/Hr) IV Continuous <Continuous>  dextrose 50% Injectable 25 Gram(s) IV Push once  dextrose 50% Injectable 12.5 Gram(s) IV Push once  dextrose 50% Injectable 25 Gram(s) IV Push once  epoetin beverley (PROCRIT) Injectable 48271 Unit(s) IV Push <User Schedule>  epoetin beverley-epbx (RETACRIT) Injectable 36776 Unit(s) IV Push <User Schedule>  folic acid 1 milliGRAM(s) Oral daily  furosemide    Tablet 40 milliGRAM(s) Oral daily  glucagon  Injectable 1 milliGRAM(s) IntraMuscular once  hydrALAZINE 25 milliGRAM(s) Oral three times a day  lactobacillus acidophilus 1 Tablet(s) Oral two times a day with meals  latanoprost 0.005% Ophthalmic Solution 1 Drop(s) Both EYES at bedtime  levothyroxine 25 MICROGram(s) Oral daily  lidocaine   4% Patch 2 Patch Transdermal daily  LORazepam     Tablet 2 milliGRAM(s) Oral <User Schedule>  melatonin 3 milliGRAM(s) Oral at bedtime  metoprolol succinate ER 50 milliGRAM(s) Oral daily  pantoprazole    Tablet 40 milliGRAM(s) Oral before breakfast  sertraline 200 milliGRAM(s) Oral daily  sevelamer carbonate 800 milliGRAM(s) Oral three times a day with meals  sucralfate 1 Gram(s) Oral four times a day      MEDICATIONS  (PRN):  acetaminophen     Tablet .. 1000 milliGRAM(s) Oral every 8 hours PRN Moderate Pain (4 - 6)  dextrose Oral Gel 15 Gram(s) Oral once PRN Blood Glucose LESS THAN 70 milliGRAM(s)/deciliter  ondansetron   Disintegrating Tablet 4 milliGRAM(s) Oral two times a day PRN Nausea and/or Vomiting  ondansetron Injectable 4 milliGRAM(s) IV Push every 6 hours PRN Nausea and/or Vomiting      REVIEW OF SYSTEMS:                           ALL ROS DONE [ X   ]    CONSTITUTIONAL:  LETHARGIC [   ], FEVER [   ], UNRESPONSIVE [   ]  CVS:  CP  [   ], SOB, [   ], PALPITATIONS [   ], DIZZYNESS [   ]  RS: COUGH [   ], SPUTUM [   ]  GI: ABDOMINAL PAIN [   ], NAUSEA [   ], VOMITINGS [   ], DIARRHEA [   ], CONSTIPATION [   ]  :  DYSURIA [   ], NOCTURIA [   ], INCREASED FREQUENCY [   ], DRIBLING [   ],  SKELETAL: PAINFUL JOINTS [   ], SWOLLEN JOINTS [   ], NECK ACHE [   ], LOW BACK ACHE [   ],  SKIN : ULCERS [   ], RASH [   ], ITCHING [   ]  CNS: HEAD ACHE [   ], DOUBLE VISION [   ], BLURRED VISION [   ], AMS / CONFUSION [   ], SEIZURES [   ], WEAKNESS [   ],TINGLING / NUMBNESS [   ]      PHYSICAL EXAM:    GENERAL APPEARANCE: NO DISTRESS,    ANASARCA ++ [IMPROVED]  HEENT:  NO PALLOR, NO  JVD,  NO   NODES, NECK SUPPLE  CVS: S1 +, S2 +,   RS: AEEB,  OCCASIONAL  RALES +,   CRACKLES+ AT LUNG BASES [IMPROVED]  ABD: SOFT, NT, NO, BS +  EXT: LEFT BKA  SKIN: WARM,   SKELETAL:  ROM ACCEPTABLE  CNS:  AAO X  2-3        RADIOLOGY :    RADIOLOGY AND READINGS REVIEWED          ASSESSMENT :     Hypervolemia    Hypertension    Adrenal insufficiency    CKD (chronic kidney disease)    Anemia    Glaucoma    Coronary artery disease    HLD (hyperlipidemia)    Peripheral vascular disease    Spinal stenosis of lumbosacral region    Hyperparathyroidism    Diabetes mellitus    Diabetic neuropathy    Contracture of hand    Osteoarthritis    Osteoporosis    Vision loss of left eye    ESRD on hemodialysis    Cataract    BPH (benign prostatic hyperplasia)    UTI (urinary tract infection)    Bladder mass    H/O hematuria    Osteoporosis    Vision loss of right eye    Depression    Chronic GERD    Osteomyelitis of vertebra    CHF (congestive heart failure)    Below knee amputation status, left    History of right cataract extraction    History of left cataract extraction    S/P arteriovenous (AV) fistula creation    H/O hematuria    H/O transurethral destruction of bladder lesion    History of excision of mass        PLAN:  HPI:  Patient is 62 year old male from [facility], walks with RW, with PMH of ESRD on HD (TTS), BKA- L w/prosthetic leg, DM, Left eye blindness, CHF, CAD, HTN, HLD, osteoporosis, bronchitis, current smoker, and anemia who presents with complaint of missed dialysis. Last HD . Pt states he often missed HD sessions.  patient states he missed this HD session due to mild pain in left leg, patient remains able to ambulate. Pt complains of right leg swelling and states he does not make any urine. Patient states he was having mild shortness of breath.  Pt denies any fever, chills, N/V/D, constipation, CP, numbness or tingling (2023 19:20)    # [8/9] MEETING HELD WITH PATIENT, BROTHER ARTEMIO @ [phone] AND  TEAM. PATIENT W/ HISTORY OF ROUTINE NONCOMPLIANCE W/ LIFE-SUSTAINING TREATMENT [HD], EVALUATIONS AND INTERVENTIONS - COUNSELLED AT LENGTH TO REMAIN COMPLIANT. DISCUSSED THAT PROGNOSIS IS POOR/GRIM GIVEN UNDERLYING MEDICAL CONDITIONS AND GIVEN NONCOMPLIANCE. HE VERBALIZED UNDERSTANDING. REGARDING GOC - PATIENT WISHES FOR FULL CODE. PALLIATIVE CARE CONSULTED. PSYCHIATRY CONSULTED. PATIENT EXPRESSED THAT HE DOES GROW IMPATIENT DURING DIALYSIS SESSIONS AND HAS BEEN LEAVING SESSIONS SOONER THAN PRESCRIBED. IN DISCUSSION THAT RISKS OF DEFERRING COMPLETE HD - INCLUDE BUT ARE NOT LIMITED TO WORSENING CLINICAL CONDITION, ELECTROLYTE ABNORMALITIES, ARRHYTHMIA AND DEATH. HE VERBALIZED UNDERSTANDING. D/W PATIENT THAT REPEATEDLY REFUSING INTERVENTIONS AND ASSESSMENTS IS NOT IN LINE WITH HIS WISHES TO IMPROVE. PATIENT VERBALIZED UNDERSTANDING. DISCUSSED REGARDING CURRENT CLINICAL CONDITION, RECOMMENDED EVALUATIONS AND INTERVENTIONS. ALL QUESTIONS ANSWERED. TEAM HAS OFFERED PATIENT EMOTIONAL SUPPORT AND OFFERED MEDICATION FOR MOOD. OFFERED TO PRE-MEDICATE W/ ANXIOLYTIC PRIOR TO HD. PATIENT IS AGREEABLE.     DISCUSSED THAT PATIENT WILL NEED TO COMPLY WITH HD SESSIONS IN ORDER TO BE MEDICALLY OPTIMIZED FOR DISCHARGE AND FOR SAFE D/C PLANNING. TEAM DISCUSSED OPTIONS OF RETURNING TO [facility] W/ OUTPATIENT HD , IF CARE NEEDS CAN BE SAFELY MET VS. NURSING HOME/[facility] . PATIENT AND BROTHER VERBALIZED UNDERSTANDING.     - SW AND MEDICAL TEAM HAVING ONGOING DISCUSSION WITH PATIENT REGARDING CONSISTENT COMPLIANCE IN ORDER TO RETURN TO HD IN THE OUTPATIENT SETTING  -- CONVEYED THAT PATIENT NEEDS TO TEAM THAT PATIENT WILL NEED TO COMPLETE FULL HD SESSION IN ORDER TO BE CONSIDERED BY OUTPATIENT HD AT NH W/ ONSITE HD OR OUTSIDE HD FACILITY      # ACUTE ON CHRONIC HYPOXIC RESPIRATORY FAILURE S/T PULMONARY EDEMA - S/T INCOMPLETE HD SESSIONS ; ANASARCA  # HYPERKALEMIA  # HX OF COPD + S/P RECENT MULTIFOCAL PNEUMONIA ; R/O ASPIRATION PNEUMONIA  # PULMONARY HYPERTENSION  # UNCONTROLLED HTN  # ESRD ON HD TTS - W/ HX OF NONCOMPLIANCE    - TELEMETRY  - HYDRALAZINE, METOPROLOL AND NORVASC ; PRN IV ANTIHYPERTENSIVE  - LOKELMA  - SUPPLEMENTAL O2  - PLANNED FOR HD  - NEPHROLOGY CONSULT  - PULMONOLOGY CONSULT  - ID CONSULT    - CONTINUES TO REFUSE OR PRE-MATURELY DISCONTINUE SESSIONS OF HD       - [] - OVERNIGHT RAPID RESPONSE S/T HYPOXIA - SELF-LIMITED - PATIENT IMPROVED S/P O2 SUPPLEMENT  - S/P CEFEPIME + FLAGYLL, BCX - NGTD      # RECURRENT INTRACTABLE NAUSEA/VOMITING, ? COFFEE GROUND EMESIS  # GASTROPARESIS  # HISTORY OF ESOPHAGITIS AND DUODENITIS [2021], HX OF GASTROPARESIS W/ EVIDENCE OF THICKENED ESOPHAGUS ON PREVIOUS CT SCAN   # PANCREAS W/ DOUBLE DUCT SIGN    - MONITORING HGB, PLACED ON PPI BID , CARAFATE QID  - PRN ANTIEMETICS  ; S/P DOSE OF REGLAN [WITH MINIMAL IMPROVEMENT]  - MONITORING FOR SYMPTOMS  - WHILE ON DIET PATIENT REPEATEDLY COUNSELLED TO CHEW FOOD CAUTIOUSLY AND CONSUME SMALL BITES W/ REGULAR CLEARING WITH WATER BETWEEN BITES    - ADVANCE DIET AS TOLERATED  - NOTED CT A/P    - S/P RECENT PRBC TRANSFUSION     - GI CONSULT  - SURGERY CONSULT    - [] - EPISODE OF NAUSEA/VOMITING OVER THE WEEKEND - IMPROVED    # LESS LIKELY CHOLECYSTITIS  - S/P ABX  - NOTED CT A/P  - HIDA SCAN - NEGATIVE  - SURGERY CONSULT    # ELEVATED TROPONINS - ? DEMAND ISCHEMIA    - S/P TELEMETRY  - TREND TROPONINS  - ECHO - TRACE MR, MODERATELY INCREASED LV WALL THICKNESS, NORMAL LV SYSTOLIC FUNCTION, SEVERE PULMONARY HTN  - CARDIOLOGY CONSULT    # EPISODE OF HYPOGLYCEMIA - IMPROVED  # GASTROPARESIS  # UNDERLYING DM  - SSI + FS  - COUNSELLED TO COMPLY WITH HD TO REDUCE UREMIA    - REPEATEDLY COUNSELLED TO COMPLY WITH FINGERSTICKS      # DEPRESSION  - PLACED ON ZOLOFT  - PSYCHIATRY CONSULT    # PATIENT UNDERGOING OUTPATIENT WORKUP FOR CENTRAL VENOUS STENOSIS THROUGH NEPHROLOGY TEAM  - ? s/p STENT PLACEMENT    # ANEMIA OF CKD  # HX OF PANCYTOPENIA   - TREND HGB, TRANSFUSION THRESHOLD HGB < 7; PATIENT STILL INTERMITTENTLY REFUSING BLOODWORK, COUNSELLED TO COMPLY  - TYPE AND SCREEN  - ON EPO  - DENIES RECENT HEMATEMESIS, MELENA, HEMATOCHEZIA    - S/P RECENT PRBC TRANSFUSION  - S/P PRBC TRANSFUSION     - PPI BID, CARAFATE QID    # SEVERE PROTEIN CALORIE MALNUTRITION, FAILURE TO THRIVE   -  TEMPORAL WASTING, LOSS OF MUSCLE MASS FROM SHOULDER AND HIP GIRDLE  - NUTRITIONAL SUPPLEMENT    # HLD  # PARTIALLY BLIND  # S/P LEFT BKA  # HX OF PVD  # LS SPINAL STENOSIS  # GI AND DVT PPX     Patient is a 62y old  Male who presents with a chief complaint of Missed dialysis (19 Aug 2023 10:48)    PATIENT IS SEEN AND EXAMINED IN MEDICAL FLOOR.      ALLERGIES:  No Known Drug Allergies  fish (Rash)  liver (Anaphylaxis)      Daily     Daily Weight in k (19 Aug 2023 05:09)    VITALS:    Vital Signs Last 24 Hrs  T(C): 36.7 (19 Aug 2023 05:09), Max: 36.8 (18 Aug 2023 11:37)  T(F): 98.1 (19 Aug 2023 05:09), Max: 98.3 (18 Aug 2023 11:37)  HR: 89 (19 Aug 2023 05:09) (76 - 89)  BP: 131/66 (19 Aug 2023 05:09) (111/60 - 131/66)  BP(mean): --  RR: 18 (19 Aug 2023 05:09) (16 - 18)  SpO2: 100% (19 Aug 2023 05:09) (100% - 100%)    Parameters below as of 19 Aug 2023 05:09  Patient On (Oxygen Delivery Method): nasal cannula  O2 Flow (L/min): 2      LABS:    CBC Full  -  ( 18 Aug 2023 16:10 )  WBC Count : 2.70 K/uL  RBC Count : 3.48 M/uL  Hemoglobin : 9.8 g/dL  Hematocrit : 31.6 %  Platelet Count - Automated : 100 K/uL  Mean Cell Volume : 90.8 fl  Mean Cell Hemoglobin : 28.2 pg  Mean Cell Hemoglobin Concentration : 31.0 gm/dL  Auto Neutrophil # : 2.16 K/uL  Auto Lymphocyte # : 0.25 K/uL  Auto Monocyte # : 0.19 K/uL  Auto Eosinophil # : 0.07 K/uL  Auto Basophil # : 0.02 K/uL  Auto Neutrophil % : 80.0 %  Auto Lymphocyte % : 9.3 %  Auto Monocyte % : 7.0 %  Auto Eosinophil % : 2.6 %  Auto Basophil % : 0.7 %      18    128<L>  |  103  |  37<H>  ----------------------------<  114<H>  4.8   |  15<L>  |  12.30<H>    Ca    7.7<L>      18 Aug 2023 16:10      CAPILLARY BLOOD GLUCOSE      POCT Blood Glucose.: 91 mg/dL (18 Aug 2023 17:15)  POCT Blood Glucose.: 183 mg/dL (18 Aug 2023 11:21)          Creatinine Trend: 12.30<--, 10.70<--, 11.70<--, 12.90<--, 11.70<--, 17.50<--  I&O's Summary    18 Aug 2023 07:01  -  19 Aug 2023 07:00  --------------------------------------------------------  IN: 600 mL / OUT: 1310 mL / NET: -710 mL            .Blood Blood-Peripheral  - @ 14:07   No Growth Final  --  --          MEDICATIONS:    MEDICATIONS  (STANDING):  albuterol    90 MICROgram(s) HFA Inhaler 2 Puff(s) Inhalation every 6 hours  amLODIPine   Tablet 10 milliGRAM(s) Oral daily  aspirin enteric coated 81 milliGRAM(s) Oral daily  atorvastatin 40 milliGRAM(s) Oral at bedtime  budesonide 160 MICROgram(s)/formoterol 4.5 MICROgram(s) Inhaler 2 Puff(s) Inhalation two times a day  chlorhexidine 2% Cloths 1 Application(s) Topical daily  dextrose 5%. 1000 milliLiter(s) (50 mL/Hr) IV Continuous <Continuous>  dextrose 5%. 1000 milliLiter(s) (100 mL/Hr) IV Continuous <Continuous>  dextrose 50% Injectable 25 Gram(s) IV Push once  dextrose 50% Injectable 12.5 Gram(s) IV Push once  dextrose 50% Injectable 25 Gram(s) IV Push once  epoetin beverley (PROCRIT) Injectable 75936 Unit(s) IV Push <User Schedule>  epoetin beverley-epbx (RETACRIT) Injectable 41833 Unit(s) IV Push <User Schedule>  folic acid 1 milliGRAM(s) Oral daily  furosemide    Tablet 40 milliGRAM(s) Oral daily  glucagon  Injectable 1 milliGRAM(s) IntraMuscular once  hydrALAZINE 25 milliGRAM(s) Oral three times a day  lactobacillus acidophilus 1 Tablet(s) Oral two times a day with meals  latanoprost 0.005% Ophthalmic Solution 1 Drop(s) Both EYES at bedtime  levothyroxine 25 MICROGram(s) Oral daily  lidocaine   4% Patch 2 Patch Transdermal daily  LORazepam     Tablet 2 milliGRAM(s) Oral <User Schedule>  melatonin 3 milliGRAM(s) Oral at bedtime  metoprolol succinate ER 50 milliGRAM(s) Oral daily  pantoprazole    Tablet 40 milliGRAM(s) Oral before breakfast  sertraline 200 milliGRAM(s) Oral daily  sevelamer carbonate 800 milliGRAM(s) Oral three times a day with meals  sucralfate 1 Gram(s) Oral four times a day      MEDICATIONS  (PRN):  acetaminophen     Tablet .. 1000 milliGRAM(s) Oral every 8 hours PRN Moderate Pain (4 - 6)  dextrose Oral Gel 15 Gram(s) Oral once PRN Blood Glucose LESS THAN 70 milliGRAM(s)/deciliter  ondansetron   Disintegrating Tablet 4 milliGRAM(s) Oral two times a day PRN Nausea and/or Vomiting  ondansetron Injectable 4 milliGRAM(s) IV Push every 6 hours PRN Nausea and/or Vomiting      REVIEW OF SYSTEMS:                           ALL ROS DONE [ X   ]    CONSTITUTIONAL:  LETHARGIC [   ], FEVER [   ], UNRESPONSIVE [   ]  CVS:  CP  [   ], SOB, [   ], PALPITATIONS [   ], DIZZYNESS [   ]  RS: COUGH [   ], SPUTUM [   ]  GI: ABDOMINAL PAIN [   ], NAUSEA [   ], VOMITINGS [   ], DIARRHEA [   ], CONSTIPATION [   ]  :  DYSURIA [   ], NOCTURIA [   ], INCREASED FREQUENCY [   ], DRIBLING [   ],  SKELETAL: PAINFUL JOINTS [   ], SWOLLEN JOINTS [   ], NECK ACHE [   ], LOW BACK ACHE [   ],  SKIN : ULCERS [   ], RASH [   ], ITCHING [   ]  CNS: HEAD ACHE [   ], DOUBLE VISION [   ], BLURRED VISION [   ], AMS / CONFUSION [   ], SEIZURES [   ], WEAKNESS [   ],TINGLING / NUMBNESS [   ]      PHYSICAL EXAM:    GENERAL APPEARANCE: NO DISTRESS,    ANASARCA ++ [IMPROVED]  HEENT:  NO PALLOR, NO  JVD,  NO   NODES, NECK SUPPLE  CVS: S1 +, S2 +,   RS: AEEB,  OCCASIONAL  RALES +,   CRACKLES+ AT LUNG BASES [IMPROVED]  ABD: SOFT, NT, NO, BS +  EXT: LEFT BKA  SKIN: WARM,   SKELETAL:  ROM ACCEPTABLE  CNS:  AAO X  2-3        RADIOLOGY :    RADIOLOGY AND READINGS REVIEWED          ASSESSMENT :     Hypervolemia    Hypertension    Adrenal insufficiency    CKD (chronic kidney disease)    Anemia    Glaucoma    Coronary artery disease    HLD (hyperlipidemia)    Peripheral vascular disease    Spinal stenosis of lumbosacral region    Hyperparathyroidism    Diabetes mellitus    Diabetic neuropathy    Contracture of hand    Osteoarthritis    Osteoporosis    Vision loss of left eye    ESRD on hemodialysis    Cataract    BPH (benign prostatic hyperplasia)    UTI (urinary tract infection)    Bladder mass    H/O hematuria    Osteoporosis    Vision loss of right eye    Depression    Chronic GERD    Osteomyelitis of vertebra    CHF (congestive heart failure)    Below knee amputation status, left    History of right cataract extraction    History of left cataract extraction    S/P arteriovenous (AV) fistula creation    H/O hematuria    H/O transurethral destruction of bladder lesion    History of excision of mass        PLAN:  HPI:  Patient is 62 year old male from [facility], walks with RW, with PMH of ESRD on HD (TTS), BKA- L w/prosthetic leg, DM, Left eye blindness, CHF, CAD, HTN, HLD, osteoporosis, bronchitis, current smoker, and anemia who presents with complaint of missed dialysis. Last HD . Pt states he often missed HD sessions.  patient states he missed this HD session due to mild pain in left leg, patient remains able to ambulate. Pt complains of right leg swelling and states he does not make any urine. Patient states he was having mild shortness of breath.  Pt denies any fever, chills, N/V/D, constipation, CP, numbness or tingling (2023 19:20)    # [8/9] MEETING HELD WITH PATIENT, BROTHER ARTEMIO @ [phone] AND SW TEAM. PATIENT W/ HISTORY OF ROUTINE NONCOMPLIANCE W/ LIFE-SUSTAINING TREATMENT [HD], EVALUATIONS AND INTERVENTIONS - COUNSELLED AT LENGTH TO REMAIN COMPLIANT. DISCUSSED THAT PROGNOSIS IS POOR/GRIM GIVEN UNDERLYING MEDICAL CONDITIONS AND GIVEN NONCOMPLIANCE. HE VERBALIZED UNDERSTANDING. REGARDING GOC - PATIENT WISHES FOR FULL CODE. PALLIATIVE CARE CONSULTED. PSYCHIATRY CONSULTED. PATIENT EXPRESSED THAT HE DOES GROW IMPATIENT DURING DIALYSIS SESSIONS AND HAS BEEN LEAVING SESSIONS SOONER THAN PRESCRIBED. IN DISCUSSION THAT RISKS OF DEFERRING COMPLETE HD - INCLUDE BUT ARE NOT LIMITED TO WORSENING CLINICAL CONDITION, ELECTROLYTE ABNORMALITIES, ARRHYTHMIA AND DEATH. HE VERBALIZED UNDERSTANDING. D/W PATIENT THAT REPEATEDLY REFUSING INTERVENTIONS AND ASSESSMENTS IS NOT IN LINE WITH HIS WISHES TO IMPROVE. PATIENT VERBALIZED UNDERSTANDING. DISCUSSED REGARDING CURRENT CLINICAL CONDITION, RECOMMENDED EVALUATIONS AND INTERVENTIONS. ALL QUESTIONS ANSWERED. TEAM HAS OFFERED PATIENT EMOTIONAL SUPPORT AND OFFERED MEDICATION FOR MOOD. OFFERED TO PRE-MEDICATE W/ ANXIOLYTIC PRIOR TO HD. PATIENT IS AGREEABLE.     DISCUSSED THAT PATIENT WILL NEED TO COMPLY WITH HD SESSIONS IN ORDER TO BE MEDICALLY OPTIMIZED FOR DISCHARGE AND FOR SAFE D/C PLANNING. TEAM DISCUSSED OPTIONS OF RETURNING TO [facility] W/ OUTPATIENT HD , IF CARE NEEDS CAN BE SAFELY MET VS. NURSING HOME/[facility] . PATIENT AND BROTHER VERBALIZED UNDERSTANDING.     - SW AND MEDICAL TEAM HAVING ONGOING DISCUSSION WITH PATIENT REGARDING CONSISTENT COMPLIANCE IN ORDER TO RETURN TO HD IN THE OUTPATIENT SETTING  -- CONVEYED THAT PATIENT AND TEAM THAT PATIENT WILL NEED TO CONSISTENTLY COMPLETE FULL HD SESSIONS IN ORDER TO BE CONSIDERED BY OUTPATIENT HD AT NH W/ ONSITE HD OR OUTSIDE HD FACILITY      # ACUTE ON CHRONIC HYPOXIC RESPIRATORY FAILURE S/T PULMONARY EDEMA - S/T INCOMPLETE HD SESSIONS ; ANASARCA  # HYPERKALEMIA  # HX OF COPD + S/P RECENT MULTIFOCAL PNEUMONIA ; R/O ASPIRATION PNEUMONIA  # PULMONARY HYPERTENSION  # UNCONTROLLED HTN  # ESRD ON HD TTS - W/ HX OF NONCOMPLIANCE    - TELEMETRY  - HYDRALAZINE, METOPROLOL AND NORVASC ; PRN IV ANTIHYPERTENSIVE  - LOKELMA  - SUPPLEMENTAL O2  - PLANNED FOR HD  - NEPHROLOGY CONSULT  - PULMONOLOGY CONSULT  - ID CONSULT    - CONTINUES TO REFUSE OR PRE-MATURELY DISCONTINUE SESSIONS OF HD       - [] - OVERNIGHT RAPID RESPONSE S/T HYPOXIA - SELF-LIMITED - PATIENT IMPROVED S/P O2 SUPPLEMENT  - S/P CEFEPIME + FLAGYLL, BCX - NGTD      # RECURRENT INTRACTABLE NAUSEA/VOMITING, ? COFFEE GROUND EMESIS  # GASTROPARESIS  # HISTORY OF ESOPHAGITIS AND DUODENITIS [2021], HX OF GASTROPARESIS W/ EVIDENCE OF THICKENED ESOPHAGUS ON PREVIOUS CT SCAN   # PANCREAS W/ DOUBLE DUCT SIGN    - MONITORING HGB, PLACED ON PPI BID , CARAFATE QID  - PRN ANTIEMETICS  ; S/P DOSE OF REGLAN [WITH MINIMAL IMPROVEMENT]  - MONITORING FOR SYMPTOMS  - WHILE ON DIET PATIENT REPEATEDLY COUNSELLED TO CHEW FOOD CAUTIOUSLY AND CONSUME SMALL BITES W/ REGULAR CLEARING WITH WATER BETWEEN BITES    - ADVANCE DIET AS TOLERATED  - NOTED CT A/P    - S/P RECENT PRBC TRANSFUSION     - GI CONSULT  - SURGERY CONSULT    - [] - EPISODE OF NAUSEA/VOMITING OVER THE WEEKEND - IMPROVED    # LESS LIKELY CHOLECYSTITIS  - S/P ABX  - NOTED CT A/P  - HIDA SCAN - NEGATIVE  - SURGERY CONSULT    # ELEVATED TROPONINS - ? DEMAND ISCHEMIA    - S/P TELEMETRY  - TREND TROPONINS  - ECHO - TRACE MR, MODERATELY INCREASED LV WALL THICKNESS, NORMAL LV SYSTOLIC FUNCTION, SEVERE PULMONARY HTN  - CARDIOLOGY CONSULT    # EPISODE OF HYPOGLYCEMIA - IMPROVED  # GASTROPARESIS  # UNDERLYING DM  - SSI + FS  - COUNSELLED TO COMPLY WITH HD TO REDUCE UREMIA    - REPEATEDLY COUNSELLED TO COMPLY WITH FINGERSTICKS      # DEPRESSION  - PLACED ON ZOLOFT  - PSYCHIATRY CONSULT    # PATIENT UNDERGOING OUTPATIENT WORKUP FOR CENTRAL VENOUS STENOSIS THROUGH NEPHROLOGY TEAM  - ? s/p STENT PLACEMENT    # ANEMIA OF CKD  # HX OF PANCYTOPENIA   - TREND HGB, TRANSFUSION THRESHOLD HGB < 7; PATIENT STILL INTERMITTENTLY REFUSING BLOODWORK, COUNSELLED TO COMPLY  - TYPE AND SCREEN  - ON EPO  - DENIES RECENT HEMATEMESIS, MELENA, HEMATOCHEZIA    - S/P RECENT PRBC TRANSFUSION  - S/P PRBC TRANSFUSION     - PPI BID, CARAFATE QID    # SEVERE PROTEIN CALORIE MALNUTRITION, FAILURE TO THRIVE   -  TEMPORAL WASTING, LOSS OF MUSCLE MASS FROM SHOULDER AND HIP GIRDLE  - NUTRITIONAL SUPPLEMENT    # HLD  # PARTIALLY BLIND  # S/P LEFT BKA  # HX OF PVD  # LS SPINAL STENOSIS  # GI AND DVT PPX

## 2023-08-19 NOTE — PROGRESS NOTE ADULT - ASSESSMENT
# ESRD.  HD Monday  UF as tolerated  D/W pt compliance with HD   pre-HD Ativan   # anemia of CKD. epogen on HD. transfuse for HBG <7  #CKDMBD. cont sevelamer  # HTN. blood pressure at goal         For any question, call:  Cell # [phone]  Pager # [phone]  Callback # [phone]

## 2023-08-19 NOTE — PROGRESS NOTE ADULT - SUBJECTIVE AND OBJECTIVE BOX
[facility] - POLA Garcia / POLA Doll / AKSHAT Tello/ POLA Maddox/ POLA Young/ ELISA [place] / JARAD Mosher / FLORA Laboy  ---------------------------------------------------------------------------------------------------------------  seen and examined today for ESRD  Interval : NAD  VITALS:  T(F): 98.1 (08-19-23 @ 05:09), Max: 98.3 (08-18-23 @ 11:37)  HR: 89 (08-19-23 @ 05:09)  BP: 131/66 (08-19-23 @ 05:09)  RR: 18 (08-19-23 @ 05:09)  SpO2: 100% (08-19-23 @ 05:09)  Wt(kg): --    08-18 @ 07:01  -  08-19 @ 07:00  --------------------------------------------------------  IN: 600 mL / OUT: 1310 mL / NET: -710 mL      Physical Exam :-  Constitutional: NAD  Neck: Supple.  Respiratory: Bilateral equal breath sounds,  Cardiovascular: S1, S2 normal,  Gastrointestinal: Bowel Sounds present, soft, non tender.  Extremities: No edema  Neurological: Alert and Oriented  Psychiatric: Normal mood, normal affect  Data:-  Allergies :   No Known Drug Allergies  fish (Rash)  liver (Anaphylaxis)    Hospital Medications:   MEDICATIONS  (STANDING):  albuterol    90 MICROgram(s) HFA Inhaler 2 Puff(s) Inhalation every 6 hours  amLODIPine   Tablet 10 milliGRAM(s) Oral daily  aspirin enteric coated 81 milliGRAM(s) Oral daily  atorvastatin 40 milliGRAM(s) Oral at bedtime  budesonide 160 MICROgram(s)/formoterol 4.5 MICROgram(s) Inhaler 2 Puff(s) Inhalation two times a day  chlorhexidine 2% Cloths 1 Application(s) Topical daily  dextrose 5%. 1000 milliLiter(s) (50 mL/Hr) IV Continuous <Continuous>  dextrose 5%. 1000 milliLiter(s) (100 mL/Hr) IV Continuous <Continuous>  dextrose 50% Injectable 25 Gram(s) IV Push once  dextrose 50% Injectable 12.5 Gram(s) IV Push once  dextrose 50% Injectable 25 Gram(s) IV Push once  epoetin beverley (PROCRIT) Injectable 57429 Unit(s) IV Push <User Schedule>  epoetin beverley-epbx (RETACRIT) Injectable 27759 Unit(s) IV Push <User Schedule>  folic acid 1 milliGRAM(s) Oral daily  furosemide    Tablet 40 milliGRAM(s) Oral daily  glucagon  Injectable 1 milliGRAM(s) IntraMuscular once  hydrALAZINE 25 milliGRAM(s) Oral three times a day  lactobacillus acidophilus 1 Tablet(s) Oral two times a day with meals  latanoprost 0.005% Ophthalmic Solution 1 Drop(s) Both EYES at bedtime  levothyroxine 25 MICROGram(s) Oral daily  lidocaine   4% Patch 2 Patch Transdermal daily  LORazepam     Tablet 2 milliGRAM(s) Oral <User Schedule>  melatonin 3 milliGRAM(s) Oral at bedtime  metoprolol succinate ER 50 milliGRAM(s) Oral daily  pantoprazole    Tablet 40 milliGRAM(s) Oral before breakfast  sertraline 200 milliGRAM(s) Oral daily  sevelamer carbonate 800 milliGRAM(s) Oral three times a day with meals  sucralfate 1 Gram(s) Oral four times a day    08-18    128<L>  |  103  |  37<H>  ----------------------------<  114<H>  4.8   |  15<L>  |  12.30<H>    Ca    7.7<L>      18 Aug 2023 16:10      Creatinine Trend: 12.30 <--, 10.70 <--, 11.70 <--                        9.8    2.70  )-----------( 100      ( 18 Aug 2023 16:10 )             31.6

## 2023-08-19 NOTE — PROGRESS NOTE ADULT - SUBJECTIVE AND OBJECTIVE BOX
Time of Visit:  Patient seen and examined. sitting in chair comfortable     MEDICATIONS  (STANDING):  albuterol    90 MICROgram(s) HFA Inhaler 2 Puff(s) Inhalation every 6 hours  amLODIPine   Tablet 10 milliGRAM(s) Oral daily  aspirin enteric coated 81 milliGRAM(s) Oral daily  atorvastatin 40 milliGRAM(s) Oral at bedtime  budesonide 160 MICROgram(s)/formoterol 4.5 MICROgram(s) Inhaler 2 Puff(s) Inhalation two times a day  chlorhexidine 2% Cloths 1 Application(s) Topical daily  dextrose 5%. 1000 milliLiter(s) (50 mL/Hr) IV Continuous <Continuous>  dextrose 5%. 1000 milliLiter(s) (100 mL/Hr) IV Continuous <Continuous>  dextrose 50% Injectable 25 Gram(s) IV Push once  dextrose 50% Injectable 12.5 Gram(s) IV Push once  dextrose 50% Injectable 25 Gram(s) IV Push once  epoetin beverley (PROCRIT) Injectable 53977 Unit(s) IV Push <User Schedule>  epoetin beverley-epbx (RETACRIT) Injectable 22735 Unit(s) IV Push <User Schedule>  folic acid 1 milliGRAM(s) Oral daily  furosemide    Tablet 40 milliGRAM(s) Oral daily  glucagon  Injectable 1 milliGRAM(s) IntraMuscular once  hydrALAZINE 25 milliGRAM(s) Oral three times a day  lactobacillus acidophilus 1 Tablet(s) Oral two times a day with meals  latanoprost 0.005% Ophthalmic Solution 1 Drop(s) Both EYES at bedtime  levothyroxine 25 MICROGram(s) Oral daily  lidocaine   4% Patch 2 Patch Transdermal daily  LORazepam     Tablet 2 milliGRAM(s) Oral <User Schedule>  melatonin 3 milliGRAM(s) Oral at bedtime  metoprolol succinate ER 50 milliGRAM(s) Oral daily  pantoprazole    Tablet 40 milliGRAM(s) Oral before breakfast  sertraline 200 milliGRAM(s) Oral daily  sevelamer carbonate 800 milliGRAM(s) Oral three times a day with meals  sucralfate 1 Gram(s) Oral four times a day      MEDICATIONS  (PRN):  acetaminophen     Tablet .. 1000 milliGRAM(s) Oral every 8 hours PRN Moderate Pain (4 - 6)  dextrose Oral Gel 15 Gram(s) Oral once PRN Blood Glucose LESS THAN 70 milliGRAM(s)/deciliter  ondansetron   Disintegrating Tablet 4 milliGRAM(s) Oral two times a day PRN Nausea and/or Vomiting  ondansetron Injectable 4 milliGRAM(s) IV Push every 6 hours PRN Nausea and/or Vomiting       Medications up to date at time of exam.      PHYSICAL EXAMINATION:  Patient has no new complaints.  GENERAL: The patient is a thin man , in no apparent distress.     Vital Signs Last 24 Hrs  T(C): 36.7 (19 Aug 2023 05:09), Max: 36.7 (18 Aug 2023 21:11)  T(F): 98.1 (19 Aug 2023 05:09), Max: 98.1 (18 Aug 2023 21:11)  HR: 89 (19 Aug 2023 05:09) (76 - 89)  BP: 131/66 (19 Aug 2023 05:09) (111/60 - 131/66)  BP(mean): --  RR: 18 (19 Aug 2023 05:09) (16 - 18)  SpO2: 100% (19 Aug 2023 05:09) (100% - 100%)    Parameters below as of 19 Aug 2023 05:09  Patient On (Oxygen Delivery Method): nasal cannula  O2 Flow (L/min): 2     (if applicable)    Chest Tube (if applicable)    HEENT: Head is normocephalic and atraumatic. Extraocular muscles are intact. Mucous membranes are moist.     NECK: Supple, no palpable adenopathy.    LUNGS: Clear to auscultation, no wheezing, rales, or rhonchi.    HEART: Regular rate and rhythm without murmur.    ABDOMEN: Soft, nontender, and nondistended.  No hepatosplenomegaly is noted.    EXTREMITIES: R     NEUROLOGIC:  L BKA     SKIN: Warm, dry, good turgor.      LABS:                        9.8    2.70  )-----------( 100      ( 18 Aug 2023 16:10 )             31.6     08-18    128<L>  |  103  |  37<H>  ----------------------------<  114<H>  4.8   |  15<L>  |  12.30<H>    Ca    7.7<L>      18 Aug 2023 16:10        Urinalysis Basic - ( 18 Aug 2023 16:10 )    Color: x / Appearance: x / SG: x / pH: x  Gluc: 114 mg/dL / Ketone: x  / Bili: x / Urobili: x   Blood: x / Protein: x / Nitrite: x   Leuk Esterase: x / RBC: x / WBC x   Sq Epi: x / Non Sq Epi: x / Bacteria: x                      MICROBIOLOGY: (if applicable)    RADIOLOGY & ADDITIONAL STUDIES:  EKG:   CXR:  ECHO:    IMPRESSION: 62y Male 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      Osteoarthritis      Vision loss of left eye  blind      ESRD on hemodialysis  T/Th/S      Cataract  both eyes - hx of sx done      BPH (benign prostatic hyperplasia)      UTI (urinary tract infection)  hx of      Bladder mass  hx of      H/O hematuria      Osteoporosis      Vision loss of right eye  decreased      Depression      Chronic GERD      Osteomyelitis of vertebra      CHF (congestive heart failure)      Below knee amputation status, left  2012- pt is wearing prostesis      History of right cataract extraction      History of left cataract extraction      S/P arteriovenous (AV) fistula creation  right arm brachiocephalic arteriovenous fistula on 11/08/2018      H/O hematuria  s/p bladder bx and fulguration 2/25/2020      H/O transurethral destruction of bladder lesion  2020      History of excision of mass  back mass on 03/31/2021       p/w           Impression: This is a 62 yr old man  from [facility] with ESRD on HD ( T-Th-Sat ). Current smoker . Presented to ED due to Missed Dialysis, last HD 07-22-23 . Per Patient stated that he often missed HD sessions due to Mild left leg pain and right leg swelling . S/p RRT for SOB with Hypoxia due to Acute Hypoxic Respiratory Failure secondary to Pulmonary edema/ Fluid overload due to Missed Dialysis . CT Abdomen with Small Bilateral Pleural Effusions, Rt Lower Lung with groundglass opacity. No bowel obstruction. Small Gall Stone.   Vomiting due to diabetic gastroparesis vs acute cholecystitis       Suggestions:  - HD as per Neph .. schedule for 8/21  - Neph suggest premedicate with ativan before HD   - Refusing iv access   - Limit fluid intake   - Continue O2 Supp as needed to maintain sat >90%   - Continue Duoneb via nebulization Q 6 Hours .   - Reinforced the importance of compliance to Dialysis schedule.   - Aspiration precaution with HOB elevation especially during meal times.      - CXR in am

## 2023-08-20 NOTE — PROGRESS NOTE ADULT - SUBJECTIVE AND OBJECTIVE BOX
[facility] - POLA Garcia / POLA Doll / AKSHAT Tello/ POLA Maddox/ POLA Young/ ELISA [place] / JARAD Mosher / FLORA Laboy  ---------------------------------------------------------------------------------------------------------------  seen and examined today for ESRD  Interval : NAD  VITALS:  T(F): 98.4 (08-20-23 @ 14:12), Max: 98.4 (08-20-23 @ 14:12)  HR: 84 (08-20-23 @ 14:12)  BP: 143/52 (08-20-23 @ 14:12)  RR: 20 (08-20-23 @ 14:12)  SpO2: 100% (08-20-23 @ 14:12)  Wt(kg): --    Physical Exam :-  Constitutional: NAD  Neck: Supple.  Respiratory: Bilateral equal breath sounds,  Cardiovascular: S1, S2 normal,  Gastrointestinal: Bowel Sounds present, soft, non tender.  Extremities: No edema  Neurological: Alert and Oriented  Psychiatric: Normal mood, normal affect  Data:-  Allergies :   No Known Drug Allergies  fish (Rash)  liver (Anaphylaxis)    Hospital Medications:   MEDICATIONS  (STANDING):  albuterol    90 MICROgram(s) HFA Inhaler 2 Puff(s) Inhalation every 6 hours  amLODIPine   Tablet 10 milliGRAM(s) Oral daily  aspirin enteric coated 81 milliGRAM(s) Oral daily  atorvastatin 40 milliGRAM(s) Oral at bedtime  budesonide 160 MICROgram(s)/formoterol 4.5 MICROgram(s) Inhaler 2 Puff(s) Inhalation two times a day  chlorhexidine 2% Cloths 1 Application(s) Topical daily  dextrose 5%. 1000 milliLiter(s) (50 mL/Hr) IV Continuous <Continuous>  dextrose 5%. 1000 milliLiter(s) (100 mL/Hr) IV Continuous <Continuous>  dextrose 50% Injectable 25 Gram(s) IV Push once  dextrose 50% Injectable 12.5 Gram(s) IV Push once  dextrose 50% Injectable 25 Gram(s) IV Push once  epoetin beverley (PROCRIT) Injectable 86591 Unit(s) IV Push <User Schedule>  epoetin beverley-epbx (RETACRIT) Injectable 54899 Unit(s) IV Push <User Schedule>  folic acid 1 milliGRAM(s) Oral daily  furosemide    Tablet 40 milliGRAM(s) Oral daily  glucagon  Injectable 1 milliGRAM(s) IntraMuscular once  hydrALAZINE 25 milliGRAM(s) Oral three times a day  lactobacillus acidophilus 1 Tablet(s) Oral two times a day with meals  latanoprost 0.005% Ophthalmic Solution 1 Drop(s) Both EYES at bedtime  levothyroxine 25 MICROGram(s) Oral daily  lidocaine   4% Patch 2 Patch Transdermal daily  LORazepam     Tablet 2 milliGRAM(s) Oral <User Schedule>  melatonin 3 milliGRAM(s) Oral at bedtime  metoprolol succinate ER 50 milliGRAM(s) Oral daily  pantoprazole    Tablet 40 milliGRAM(s) Oral before breakfast  sertraline 200 milliGRAM(s) Oral daily  sevelamer carbonate 800 milliGRAM(s) Oral three times a day with meals  sucralfate 1 Gram(s) Oral four times a day    08-18    128<L>  |  103  |  37<H>  ----------------------------<  114<H>  4.8   |  15<L>  |  12.30<H>    Ca    7.7<L>      18 Aug 2023 16:10      Creatinine Trend: 12.30 <--, 10.70 <--, 11.70 <--                        9.8    2.70  )-----------( 100      ( 18 Aug 2023 16:10 )             31.6

## 2023-08-20 NOTE — PROGRESS NOTE ADULT - SUBJECTIVE AND OBJECTIVE BOX
Time of Visit:  Patient seen and examined. pat seen earlier today , lying on bed comfortable  1:1 observation     MEDICATIONS  (STANDING):  albuterol    90 MICROgram(s) HFA Inhaler 2 Puff(s) Inhalation every 6 hours  amLODIPine   Tablet 10 milliGRAM(s) Oral daily  aspirin enteric coated 81 milliGRAM(s) Oral daily  atorvastatin 40 milliGRAM(s) Oral at bedtime  budesonide 160 MICROgram(s)/formoterol 4.5 MICROgram(s) Inhaler 2 Puff(s) Inhalation two times a day  chlorhexidine 2% Cloths 1 Application(s) Topical daily  dextrose 5%. 1000 milliLiter(s) (50 mL/Hr) IV Continuous <Continuous>  dextrose 5%. 1000 milliLiter(s) (100 mL/Hr) IV Continuous <Continuous>  dextrose 50% Injectable 25 Gram(s) IV Push once  dextrose 50% Injectable 12.5 Gram(s) IV Push once  dextrose 50% Injectable 25 Gram(s) IV Push once  epoetin beverley (PROCRIT) Injectable 82849 Unit(s) IV Push <User Schedule>  epoetin beverley-epbx (RETACRIT) Injectable 16883 Unit(s) IV Push <User Schedule>  folic acid 1 milliGRAM(s) Oral daily  furosemide    Tablet 40 milliGRAM(s) Oral daily  glucagon  Injectable 1 milliGRAM(s) IntraMuscular once  hydrALAZINE 25 milliGRAM(s) Oral three times a day  lactobacillus acidophilus 1 Tablet(s) Oral two times a day with meals  latanoprost 0.005% Ophthalmic Solution 1 Drop(s) Both EYES at bedtime  levothyroxine 25 MICROGram(s) Oral daily  lidocaine   4% Patch 2 Patch Transdermal daily  LORazepam     Tablet 2 milliGRAM(s) Oral <User Schedule>  melatonin 3 milliGRAM(s) Oral at bedtime  metoprolol succinate ER 50 milliGRAM(s) Oral daily  pantoprazole    Tablet 40 milliGRAM(s) Oral before breakfast  sertraline 200 milliGRAM(s) Oral daily  sevelamer carbonate 800 milliGRAM(s) Oral three times a day with meals  sucralfate 1 Gram(s) Oral four times a day      MEDICATIONS  (PRN):  acetaminophen     Tablet .. 1000 milliGRAM(s) Oral every 8 hours PRN Moderate Pain (4 - 6)  dextrose Oral Gel 15 Gram(s) Oral once PRN Blood Glucose LESS THAN 70 milliGRAM(s)/deciliter  ondansetron   Disintegrating Tablet 4 milliGRAM(s) Oral two times a day PRN Nausea and/or Vomiting  ondansetron Injectable 4 milliGRAM(s) IV Push every 6 hours PRN Nausea and/or Vomiting       Medications up to date at time of exam.      PHYSICAL EXAMINATION:  Patient has no new complaints.  GENERAL: The patient is a well-developed, well-nourished, in no apparent distress.     Vital Signs Last 24 Hrs  T(C): 36.9 (20 Aug 2023 14:12), Max: 36.9 (20 Aug 2023 14:12)  T(F): 98.4 (20 Aug 2023 14:12), Max: 98.4 (20 Aug 2023 14:12)  HR: 84 (20 Aug 2023 14:12) (75 - 84)  BP: 143/52 (20 Aug 2023 14:12) (133/59 - 143/52)  BP(mean): --  RR: 20 (20 Aug 2023 14:12) (20 - 20)  SpO2: 100% (20 Aug 2023 14:12) (98% - 100%)    Parameters below as of 20 Aug 2023 14:12  Patient On (Oxygen Delivery Method): nasal cannula  O2 Flow (L/min): 2     (if applicable)    Chest Tube (if applicable)    HEENT: Head is normocephalic and atraumatic. Extraocular muscles are intact. Mucous membranes are moist.     NECK: Supple, no palpable adenopathy.    LUNGS: Clear to auscultation, no wheezing, rales, or rhonchi.    HEART: Regular rate and rhythm without murmur.    ABDOMEN: Soft, nontender, and nondistended.  No hepatosplenomegaly is noted.    : No painful voiding, no pelvic pain    EXTREMITIES: L BKA     NEUROLOGIC: Awake,     SKIN: Warm, dry, good turgor.      LABS:                        9.8    2.70  )-----------( 100      ( 18 Aug 2023 16:10 )             31.6     08-18    128<L>  |  103  |  37<H>  ----------------------------<  114<H>  4.8   |  15<L>  |  12.30<H>    Ca    7.7<L>      18 Aug 2023 16:10        Urinalysis Basic - ( 18 Aug 2023 16:10 )    Color: x / Appearance: x / SG: x / pH: x  Gluc: 114 mg/dL / Ketone: x  / Bili: x / Urobili: x   Blood: x / Protein: x / Nitrite: x   Leuk Esterase: x / RBC: x / WBC x   Sq Epi: x / Non Sq Epi: x / Bacteria: x                      MICROBIOLOGY: (if applicable)    RADIOLOGY & ADDITIONAL STUDIES:  EKG:   CXR:  ECHO:    IMPRESSION: 62y Male 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      Osteoarthritis      Vision loss of left eye  blind      ESRD on hemodialysis  T/Th/S      Cataract  both eyes - hx of sx done      BPH (benign prostatic hyperplasia)      UTI (urinary tract infection)  hx of      Bladder mass  hx of      H/O hematuria      Osteoporosis      Vision loss of right eye  decreased      Depression      Chronic GERD      Osteomyelitis of vertebra      CHF (congestive heart failure)      Below knee amputation status, left  2012- pt is wearing prostesis      History of right cataract extraction      History of left cataract extraction      S/P arteriovenous (AV) fistula creation  right arm brachiocephalic arteriovenous fistula on 11/08/2018      H/O hematuria  s/p bladder bx and fulguration 2/25/2020      H/O transurethral destruction of bladder lesion  2020      History of excision of mass  back mass on 03/31/2021       p/w           Impression: This is a 62 yr old man  from [facility] with ESRD on HD ( T-Th-Sat ). Current smoker . Presented to ED due to Missed Dialysis, last HD 07-22-23 . Per Patient stated that he often missed HD sessions due to Mild left leg pain and right leg swelling . S/p RRT for SOB with Hypoxia due to Acute Hypoxic Respiratory Failure secondary to Pulmonary edema/ Fluid overload due to Missed Dialysis . CT Abdomen with Small Bilateral Pleural Effusions, Rt Lower Lung with groundglass opacity. No bowel obstruction. Small Gall Stone.   Vomiting due to diabetic gastroparesis vs acute cholecystitis       Suggestions:    - Neph suggest premedicate with ativan before HD   - Limit fluid intake   - Continue O2 Supp as needed to maintain sat >90%   - Continue Duoneb via nebulization Q 6 Hours .   - Reinforced the importance of compliance to Dialysis schedule.   - Aspiration precaution with HOB elevation especially during meal times.

## 2023-08-20 NOTE — PROGRESS NOTE ADULT - ASSESSMENT
# ESRD.  HD Monday  UF as tolerated  D/W pt compliance with HD   pre-HD Ativan   # anemia of CKD. epogen on HD. transfuse for HBG <7  #CKDMBD. cont sevelamer  # HTN. blood pressure at goal         For any question, call:  Cell # [phone]  Pager # [phone]  Callback # [phone]

## 2023-08-20 NOTE — PROGRESS NOTE ADULT - SUBJECTIVE AND OBJECTIVE BOX
Patient is a 62y old  Male who presents with a chief complaint of Missed dialysis (20 Aug 2023 15:36)    PATIENT IS SEEN AND EXAMINED IN MEDICAL FLOOR.  SULTANA [    ]    JEREMY [   ]      GT [   ]    ALLERGIES:  No Known Drug Allergies  fish (Rash)  liver (Anaphylaxis)      Daily     Daily     VITALS:    Vital Signs Last 24 Hrs  T(C): 36.7 (20 Aug 2023 22:21), Max: 36.9 (20 Aug 2023 14:12)  T(F): 98.1 (20 Aug 2023 22:21), Max: 98.4 (20 Aug 2023 14:12)  HR: 92 (20 Aug 2023 22:21) (75 - 92)  BP: 91/35 (20 Aug 2023 22:21) (91/35 - 143/52)  BP(mean): --  RR: 19 (20 Aug 2023 22:21) (19 - 20)  SpO2: 100% (20 Aug 2023 22:21) (100% - 100%)    Parameters below as of 20 Aug 2023 22:21  Patient On (Oxygen Delivery Method): nasal cannula  O2 Flow (L/min): 2      LABS:              CAPILLARY BLOOD GLUCOSE      POCT Blood Glucose.: 179 mg/dL (20 Aug 2023 21:28)  POCT Blood Glucose.: 236 mg/dL (20 Aug 2023 16:36)          Creatinine Trend: 12.30<--, 10.70<--, 11.70<--, 12.90<--, 11.70<--, 17.50<--  I&O's Summary          .Blood Blood-Peripheral  05-28 @ 14:07   No Growth Final  --  --          MEDICATIONS:    MEDICATIONS  (STANDING):  albuterol    90 MICROgram(s) HFA Inhaler 2 Puff(s) Inhalation every 6 hours  amLODIPine   Tablet 10 milliGRAM(s) Oral daily  aspirin enteric coated 81 milliGRAM(s) Oral daily  atorvastatin 40 milliGRAM(s) Oral at bedtime  budesonide 160 MICROgram(s)/formoterol 4.5 MICROgram(s) Inhaler 2 Puff(s) Inhalation two times a day  chlorhexidine 2% Cloths 1 Application(s) Topical daily  dextrose 5%. 1000 milliLiter(s) (50 mL/Hr) IV Continuous <Continuous>  dextrose 5%. 1000 milliLiter(s) (100 mL/Hr) IV Continuous <Continuous>  dextrose 50% Injectable 25 Gram(s) IV Push once  dextrose 50% Injectable 25 Gram(s) IV Push once  dextrose 50% Injectable 12.5 Gram(s) IV Push once  epoetin beverley (PROCRIT) Injectable 55196 Unit(s) IV Push <User Schedule>  epoetin beverley-epbx (RETACRIT) Injectable 43594 Unit(s) IV Push <User Schedule>  folic acid 1 milliGRAM(s) Oral daily  furosemide    Tablet 40 milliGRAM(s) Oral daily  glucagon  Injectable 1 milliGRAM(s) IntraMuscular once  hydrALAZINE 25 milliGRAM(s) Oral three times a day  lactobacillus acidophilus 1 Tablet(s) Oral two times a day with meals  latanoprost 0.005% Ophthalmic Solution 1 Drop(s) Both EYES at bedtime  levothyroxine 25 MICROGram(s) Oral daily  lidocaine   4% Patch 2 Patch Transdermal daily  LORazepam     Tablet 2 milliGRAM(s) Oral <User Schedule>  melatonin 3 milliGRAM(s) Oral at bedtime  metoprolol succinate ER 50 milliGRAM(s) Oral daily  pantoprazole    Tablet 40 milliGRAM(s) Oral before breakfast  sertraline 200 milliGRAM(s) Oral daily  sevelamer carbonate 800 milliGRAM(s) Oral three times a day with meals  sucralfate 1 Gram(s) Oral four times a day      MEDICATIONS  (PRN):  acetaminophen     Tablet .. 1000 milliGRAM(s) Oral every 8 hours PRN Moderate Pain (4 - 6)  dextrose Oral Gel 15 Gram(s) Oral once PRN Blood Glucose LESS THAN 70 milliGRAM(s)/deciliter  ondansetron   Disintegrating Tablet 4 milliGRAM(s) Oral two times a day PRN Nausea and/or Vomiting  ondansetron Injectable 4 milliGRAM(s) IV Push every 6 hours PRN Nausea and/or Vomiting      REVIEW OF SYSTEMS:                           ALL ROS DONE [ X   ]    CONSTITUTIONAL:  LETHARGIC [   ], FEVER [   ], UNRESPONSIVE [   ]  CVS:  CP  [   ], SOB, [   ], PALPITATIONS [   ], DIZZYNESS [   ]  RS: COUGH [   ], SPUTUM [   ]  GI: ABDOMINAL PAIN [   ], NAUSEA [   ], VOMITINGS [   ], DIARRHEA [   ], CONSTIPATION [   ]  :  DYSURIA [   ], NOCTURIA [   ], INCREASED FREQUENCY [   ], DRIBLING [   ],  SKELETAL: PAINFUL JOINTS [   ], SWOLLEN JOINTS [   ], NECK ACHE [   ], LOW BACK ACHE [   ],  SKIN : ULCERS [   ], RASH [   ], ITCHING [   ]  CNS: HEAD ACHE [   ], DOUBLE VISION [   ], BLURRED VISION [   ], AMS / CONFUSION [   ], SEIZURES [   ], WEAKNESS [   ],TINGLING / NUMBNESS [   ]      PHYSICAL EXAM:    GENERAL APPEARANCE: NO DISTRESS,    ANASARCA ++ [IMPROVED]  HEENT:  NO PALLOR, NO  JVD,  NO   NODES, NECK SUPPLE  CVS: S1 +, S2 +,   RS: AEEB,  OCCASIONAL  RALES +,   CRACKLES+ AT LUNG BASES [IMPROVED]  ABD: SOFT, NT, NO, BS +  EXT: LEFT BKA  SKIN: WARM,   SKELETAL:  ROM ACCEPTABLE  CNS:  AAO X  2-3        RADIOLOGY :    RADIOLOGY AND READINGS REVIEWED          ASSESSMENT :     Hypervolemia    Hypertension    Adrenal insufficiency    CKD (chronic kidney disease)    Anemia    Glaucoma    Coronary artery disease    HLD (hyperlipidemia)    Peripheral vascular disease    Spinal stenosis of lumbosacral region    Hyperparathyroidism    Diabetes mellitus    Diabetic neuropathy    Contracture of hand    Osteoarthritis    Osteoporosis    Vision loss of left eye    ESRD on hemodialysis    Cataract    BPH (benign prostatic hyperplasia)    UTI (urinary tract infection)    Bladder mass    H/O hematuria    Osteoporosis    Vision loss of right eye    Depression    Chronic GERD    Osteomyelitis of vertebra    CHF (congestive heart failure)    Below knee amputation status, left    History of right cataract extraction    History of left cataract extraction    S/P arteriovenous (AV) fistula creation    H/O hematuria    H/O transurethral destruction of bladder lesion    History of excision of mass        PLAN:  HPI:  Patient is 62 year old male from [facility], walks with RW, with PMH of ESRD on HD (TTS), BKA- L w/prosthetic leg, DM, Left eye blindness, CHF, CAD, HTN, HLD, osteoporosis, bronchitis, current smoker, and anemia who presents with complaint of missed dialysis. Last HD 7/22. Pt states he often missed HD sessions.  patient states he missed this HD session due to mild pain in left leg, patient remains able to ambulate. Pt complains of right leg swelling and states he does not make any urine. Patient states he was having mild shortness of breath.  Pt denies any fever, chills, N/V/D, constipation, CP, numbness or tingling (26 Jul 2023 19:20)    # [8/9] MEETING HELD WITH PATIENT, BROTHER ARTEMIO @ [phone] AND SW TEAM. PATIENT W/ HISTORY OF ROUTINE NONCOMPLIANCE W/ LIFE-SUSTAINING TREATMENT [HD], EVALUATIONS AND INTERVENTIONS - COUNSELLED AT LENGTH TO REMAIN COMPLIANT. DISCUSSED THAT PROGNOSIS IS POOR/GRIM GIVEN UNDERLYING MEDICAL CONDITIONS AND GIVEN NONCOMPLIANCE. HE VERBALIZED UNDERSTANDING. REGARDING GOC - PATIENT WISHES FOR FULL CODE. PALLIATIVE CARE CONSULTED. PSYCHIATRY CONSULTED. PATIENT EXPRESSED THAT HE DOES GROW IMPATIENT DURING DIALYSIS SESSIONS AND HAS BEEN LEAVING SESSIONS SOONER THAN PRESCRIBED. IN DISCUSSION THAT RISKS OF DEFERRING COMPLETE HD - INCLUDE BUT ARE NOT LIMITED TO WORSENING CLINICAL CONDITION, ELECTROLYTE ABNORMALITIES, ARRHYTHMIA AND DEATH. HE VERBALIZED UNDERSTANDING. D/W PATIENT THAT REPEATEDLY REFUSING INTERVENTIONS AND ASSESSMENTS IS NOT IN LINE WITH HIS WISHES TO IMPROVE. PATIENT VERBALIZED UNDERSTANDING. DISCUSSED REGARDING CURRENT CLINICAL CONDITION, RECOMMENDED EVALUATIONS AND INTERVENTIONS. ALL QUESTIONS ANSWERED. TEAM HAS OFFERED PATIENT EMOTIONAL SUPPORT AND OFFERED MEDICATION FOR MOOD. OFFERED TO PRE-MEDICATE W/ ANXIOLYTIC PRIOR TO HD. PATIENT IS AGREEABLE.     DISCUSSED THAT PATIENT WILL NEED TO COMPLY WITH HD SESSIONS IN ORDER TO BE MEDICALLY OPTIMIZED FOR DISCHARGE AND FOR SAFE D/C PLANNING. TEAM DISCUSSED OPTIONS OF RETURNING TO [facility] W/ OUTPATIENT HD , IF CARE NEEDS CAN BE SAFELY MET VS. NURSING HOME/[facility] . PATIENT AND BROTHER VERBALIZED UNDERSTANDING.     - SW AND MEDICAL TEAM HAVING ONGOING DISCUSSION WITH PATIENT REGARDING CONSISTENT COMPLIANCE IN ORDER TO RETURN TO HD IN THE OUTPATIENT SETTING  -- CONVEYED THAT PATIENT AND TEAM THAT PATIENT WILL NEED TO CONSISTENTLY COMPLETE FULL HD SESSIONS IN ORDER TO BE CONSIDERED BY OUTPATIENT HD AT [facility] W/ ONSITE HD OR OUTSIDE HD FACILITY      # ACUTE ON CHRONIC HYPOXIC RESPIRATORY FAILURE S/T PULMONARY EDEMA - S/T INCOMPLETE HD SESSIONS ; ANASARCA  # HYPERKALEMIA  # HX OF COPD + S/P RECENT MULTIFOCAL PNEUMONIA ; R/O ASPIRATION PNEUMONIA  # PULMONARY HYPERTENSION  # UNCONTROLLED HTN  # ESRD ON HD TTS - W/ HX OF NONCOMPLIANCE    - TELEMETRY  - HYDRALAZINE, METOPROLOL AND NORVASC ; PRN IV ANTIHYPERTENSIVE  - LOKELMA  - SUPPLEMENTAL O2  - PLANNED FOR HD  - NEPHROLOGY CONSULT  - PULMONOLOGY CONSULT  - ID CONSULT    - CONTINUES TO REFUSE OR PRE-MATURELY DISCONTINUE SESSIONS OF HD       - [7/30] - OVERNIGHT RAPID RESPONSE S/T HYPOXIA - SELF-LIMITED - PATIENT IMPROVED S/P O2 SUPPLEMENT  - S/P CEFEPIME + FLAGYLL, BCX - NGTD      # RECURRENT INTRACTABLE NAUSEA/VOMITING, ? COFFEE GROUND EMESIS  # GASTROPARESIS  # HISTORY OF ESOPHAGITIS AND DUODENITIS [1/2021], HX OF GASTROPARESIS W/ EVIDENCE OF THICKENED ESOPHAGUS ON PREVIOUS CT SCAN   # PANCREAS W/ DOUBLE DUCT SIGN    - MONITORING HGB, PLACED ON PPI BID , CARAFATE QID  - PRN ANTIEMETICS  ; S/P DOSE OF REGLAN [WITH MINIMAL IMPROVEMENT]  - MONITORING FOR SYMPTOMS  - WHILE ON DIET PATIENT REPEATEDLY COUNSELLED TO CHEW FOOD CAUTIOUSLY AND CONSUME SMALL BITES W/ REGULAR CLEARING WITH WATER BETWEEN BITES    - ADVANCE DIET AS TOLERATED  - NOTED CT A/P    - S/P RECENT PRBC TRANSFUSION     - GI CONSULT  - SURGERY CONSULT    - [8/14] - EPISODE OF NAUSEA/VOMITING OVER THE WEEKEND - IMPROVED    # LESS LIKELY CHOLECYSTITIS  - S/P ABX  - NOTED CT A/P  - HIDA SCAN - NEGATIVE  - SURGERY CONSULT    # ELEVATED TROPONINS - ? DEMAND ISCHEMIA    - S/P TELEMETRY  - TREND TROPONINS  - ECHO - TRACE MR, MODERATELY INCREASED LV WALL THICKNESS, NORMAL LV SYSTOLIC FUNCTION, SEVERE PULMONARY HTN  - CARDIOLOGY CONSULT    # EPISODE OF HYPOGLYCEMIA - IMPROVED  # GASTROPARESIS  # UNDERLYING DM  - SSI + FS  - COUNSELLED TO COMPLY WITH HD TO REDUCE UREMIA    - REPEATEDLY COUNSELLED TO COMPLY WITH FINGERSTICKS      # DEPRESSION  - PLACED ON ZOLOFT  - PSYCHIATRY CONSULT    # PATIENT UNDERGOING OUTPATIENT WORKUP FOR CENTRAL VENOUS STENOSIS THROUGH NEPHROLOGY TEAM  - ? s/p STENT PLACEMENT    # ANEMIA OF CKD  # HX OF PANCYTOPENIA   - TREND HGB, TRANSFUSION THRESHOLD HGB < 7; PATIENT STILL INTERMITTENTLY REFUSING BLOODWORK, COUNSELLED TO COMPLY  - TYPE AND SCREEN  - ON EPO  - DENIES RECENT HEMATEMESIS, MELENA, HEMATOCHEZIA    - S/P RECENT PRBC TRANSFUSION  - S/P PRBC TRANSFUSION 7/29    - PPI BID, CARAFATE QID    # SEVERE PROTEIN CALORIE MALNUTRITION, FAILURE TO THRIVE   -  TEMPORAL WASTING, LOSS OF MUSCLE MASS FROM SHOULDER AND HIP GIRDLE  - NUTRITIONAL SUPPLEMENT    # HLD  # PARTIALLY BLIND  # S/P LEFT BKA  # HX OF PVD  # LS SPINAL STENOSIS  # GI AND DVT PPX

## 2023-08-21 NOTE — PROGRESS NOTE ADULT - ASSESSMENT
Patient is a 62 year old male from [facility], walks with RW, with PMH of ESRD on HD (TTS), BKA- L w/prosthetic leg, DM, Left eye blindness, CHF, CAD, HTN, HLD, osteoporosis, bronchitis, current smoker, and anemia. Patient presented with complaint of missed dialysis and does not make any urine. Patient states he was having shortness of breath.  Admitted for Acute Hyperkalemia 2/2 missed dialysis found to have pulmonary edema and BNP [id number].  Hospital course complicated by hypoglycemia, and rapid response for AHRF.  Hospital course further c/b pt's refusal of HD, IVs, medications and bloodwork.    As discussed w/ Attending, pt needs to complete a full 3h of dialysis before d/c.      PT recommended Home PT  Patient is a 62 year old male from [facility], walks with RW, with PMH of ESRD on HD (TTS), BKA- L w/prosthetic leg, DM, Left eye blindness, CHF, CAD, HTN, HLD, osteoporosis, bronchitis, current smoker, and anemia. Patient presented to ED with difficulty breathing, SOB, missed dialysis. Patient admitted to medicine for AHRF secondary to fluid overload from missed HD session. Hospital course complicated by hypoglycemia. Cardiology consulted recommending ECHO noted, normal LV fx severe pulm HTN. Keep patient net negative. Pulmonology recommending budesonide and albuterol PRN.

## 2023-08-21 NOTE — CHART NOTE - NSCHARTNOTEFT_GEN_A_CORE
MEDICINE NP    Notified by RN patient with temperature . Seen and examined patient at bedside. Patient is alert, NAD. Denies HA, CP, SOB, cough, N/V, or abd pain.    VITAL SIGNS:  T(C): 37.7 (08-21-23 @ 21:50), Max: 38.2 (08-21-23 @ 20:35)  HR: 95 (08-21-23 @ 20:35) (58 - 96)  BP: 93/48 (08-21-23 @ 20:35) (91/35 - 120/52)  RR: 18 (08-21-23 @ 20:35) (16 - 19)  SpO2: 98% (08-21-23 @ 20:35) (96% - 100%)  Wt(kg): --      LABORATORY:                          9.9    2.28  )-----------( 96       ( 21 Aug 2023 12:00 )             32.2       08-21    123<L>  |  97  |  58<H>  ----------------------------<  200<H>  5.6<H>   |  12<L>  |  12.00<H>    Ca    7.2<L>      21 Aug 2023 12:00  Phos  6.6     08-21  Mg     2.8     08-21    TPro  6.7  /  Alb  2.9<L>  /  TBili  0.4  /  DBili  x   /  AST  50<H>  /  ALT  73<H>  /  AlkPhos  134<H>  08-21          MICROBIOLOGY:           RADIOLOGY:          PHYSICAL EXAM:    Constitutional: AOx3. NAD.    Respiratory: clear lungs bilaterally. No wheezing, rhonchi, or crackles.    Cardiovascular: S1 S2. No murmurs.    Gastrointestinal: BS X4 active. soft. nontender.    Extremities/Vascular: +2 pulses bilaterally. No BLE edema.      ASSESSMENT/PLAN:   HPI:  Patient is 62 year old male from [facility], walks with RW, with PMH of ESRD on HD (TTS), BKA- L w/prosthetic leg, DM, Left eye blindness, CHF, CAD, HTN, HLD, osteoporosis, bronchitis, current smoker, and anemia who presents with complaint of missed dialysis. Last HD 7/22. Pt states he often missed HD sessions.  patient states he missed this HD session due to mild pain in left leg, patient remains able to ambulate. Pt complains of right leg swelling and states he does not make any urine. Patient states he was having mild shortness of breath.  Pt denies any fever, chills, N/V/D, constipation, CP, numbness or tingling (26 Jul 2023 19:20)        1) SIRS - Hypotension and fever  -tylenol and cooling measures prn for pyrexia  -BC x2,   -CXR  - Abdominal xray  - Start vanco 125 mg PO every 6 hours   -NS bolus 500 cc over 1 hour- monitor for fluid status  -F/U primary team in AM MEDICINE NP    Notified by RN patient with temperature 100.8 and hypotension. Seen and examined patient at bedside. Patient is alert, NAD.  history of noncompliance and refusing treatment interventions.  Denies HA, CP, SOB, cough, N/V, or abd pain. Staff reported patient with multiple soft BM. Patient attempted hitting at staff at times but easily directed. Patient     VITAL SIGNS:  T(C): 37.7 (08-21-23 @ 21:50), Max: 38.2 (08-21-23 @ 20:35)  HR: 95 (08-21-23 @ 20:35) (58 - 96)  BP: 93/48 (08-21-23 @ 20:35) (91/35 - 120/52)  RR: 18 (08-21-23 @ 20:35) (16 - 19)  SpO2: 98% (08-21-23 @ 20:35) (96% - 100%)  Wt(kg): --      LABORATORY:                          9.9    2.28  )-----------( 96       ( 21 Aug 2023 12:00 )             32.2       08-21    123<L>  |  97  |  58<H>  ----------------------------<  200<H>  5.6<H>   |  12<L>  |  12.00<H>    Ca    7.2<L>      21 Aug 2023 12:00  Phos  6.6     08-21  Mg     2.8     08-21    TPro  6.7  /  Alb  2.9<L>  /  TBili  0.4  /  DBili  x   /  AST  50<H>  /  ALT  73<H>  /  AlkPhos  134<H>  08-21      PHYSICAL EXAM:  CONSTITUTIONAL: febrile, NAD   HEENT: Normocephalic;  conjunctivae and sclerae clear; moist mucous membranes;   NECK : supple  CHEST/LUNG: Clear to auscultation bilaterally   HEART: S1 S2  regular  ABDOMEN: Soft, Nontender, Nondistended; Bowel sounds present  EXTREMITIES: right UE AVF, no cyanosis; no edema; no calf tenderness  SKIN: warm and dry; no rash  NERVOUS SYSTEM:  Awake and alert; Oriented  to person, person and place, confused      ASSESSMENT/PLAN:   HPI:  Patient is 62 year old male from [facility], walks with RW, with PMH of ESRD on HD (TTS), BKA- L w/prosthetic leg, DM, Left eye blindness, CHF, CAD, HTN, HLD, osteoporosis, bronchitis, current smoker, and anemia. Admitted  for AHRF secondary to fluid overload from missed HD session, now febrile T-Max 100.9 and hypotension, S/P HD today. Patient known to refused treatments. Patient refused IV access and labs. Called and spoke with patient's brother Georgi who explained and encouraged patient to adhere treatment plan despite speaking to brother patient continue to refused care       1) SIRS - Hypotension, fever, leukopenia and thrombocytopenia  -Tylenol and cooling measures prn for pyrexia  -BC x2, and CBC   -CXR - f/u result   - Abdominal xray  - Start vanco 125 mg PO every 6 hours   -NS bolus 500 cc over 1 hour- monitor for fluid status  -Oxygen supplementation PRN   -F/U primary team in AM    Patient refused IV access and Labs.

## 2023-08-21 NOTE — PROGRESS NOTE ADULT - SUBJECTIVE AND OBJECTIVE BOX
Patient is a 62y old  Male who presents with a chief complaint of Missed dialysis (20 Aug 2023 22:49)    PATIENT IS SEEN AND EXAMINED IN MEDICAL FLOOR.  MARIIT [    ]    JEREMY [   ]      GT [   ]    ALLERGIES:  No Known Drug Allergies  fish (Rash)  liver (Anaphylaxis)      Daily     Daily     VITALS:    Vital Signs Last 24 Hrs  T(C): 36.7 (21 Aug 2023 05:30), Max: 36.9 (20 Aug 2023 14:12)  T(F): 98.1 (21 Aug 2023 05:30), Max: 98.4 (20 Aug 2023 14:12)  HR: 78 (21 Aug 2023 05:30) (58 - 92)  BP: 120/52 (21 Aug 2023 05:30) (91/35 - 143/52)  BP(mean): 72 (21 Aug 2023 01:28) (71 - 72)  RR: 16 (21 Aug 2023 05:30) (16 - 20)  SpO2: 100% (21 Aug 2023 05:30) (96% - 100%)    Parameters below as of 21 Aug 2023 05:30  Patient On (Oxygen Delivery Method): room air        LABS:              CAPILLARY BLOOD GLUCOSE      POCT Blood Glucose.: 158 mg/dL (21 Aug 2023 07:39)  POCT Blood Glucose.: 179 mg/dL (20 Aug 2023 21:28)  POCT Blood Glucose.: 236 mg/dL (20 Aug 2023 16:36)          Creatinine Trend: 12.30<--, 10.70<--, 11.70<--, 12.90<--, 11.70<--, 17.50<--  I&O's Summary          .Blood Blood-Peripheral  05-28 @ 14:07   No Growth Final  --  --          MEDICATIONS:    MEDICATIONS  (STANDING):  albuterol    90 MICROgram(s) HFA Inhaler 2 Puff(s) Inhalation every 6 hours  amLODIPine   Tablet 10 milliGRAM(s) Oral daily  aspirin enteric coated 81 milliGRAM(s) Oral daily  atorvastatin 40 milliGRAM(s) Oral at bedtime  budesonide 160 MICROgram(s)/formoterol 4.5 MICROgram(s) Inhaler 2 Puff(s) Inhalation two times a day  chlorhexidine 2% Cloths 1 Application(s) Topical daily  dextrose 5%. 1000 milliLiter(s) (50 mL/Hr) IV Continuous <Continuous>  dextrose 5%. 1000 milliLiter(s) (100 mL/Hr) IV Continuous <Continuous>  dextrose 50% Injectable 25 Gram(s) IV Push once  dextrose 50% Injectable 12.5 Gram(s) IV Push once  dextrose 50% Injectable 25 Gram(s) IV Push once  epoetin beverley (PROCRIT) Injectable 36842 Unit(s) IV Push <User Schedule>  epoetin beverley-epbx (RETACRIT) Injectable 12937 Unit(s) IV Push <User Schedule>  folic acid 1 milliGRAM(s) Oral daily  furosemide    Tablet 40 milliGRAM(s) Oral daily  glucagon  Injectable 1 milliGRAM(s) IntraMuscular once  hydrALAZINE 25 milliGRAM(s) Oral three times a day  lactobacillus acidophilus 1 Tablet(s) Oral two times a day with meals  latanoprost 0.005% Ophthalmic Solution 1 Drop(s) Both EYES at bedtime  levothyroxine 25 MICROGram(s) Oral daily  lidocaine   4% Patch 2 Patch Transdermal daily  LORazepam     Tablet 2 milliGRAM(s) Oral <User Schedule>  melatonin 3 milliGRAM(s) Oral at bedtime  metoprolol succinate ER 50 milliGRAM(s) Oral daily  pantoprazole    Tablet 40 milliGRAM(s) Oral before breakfast  sertraline 200 milliGRAM(s) Oral daily  sevelamer carbonate 800 milliGRAM(s) Oral three times a day with meals  sucralfate 1 Gram(s) Oral four times a day      MEDICATIONS  (PRN):  acetaminophen     Tablet .. 1000 milliGRAM(s) Oral every 8 hours PRN Moderate Pain (4 - 6)  dextrose Oral Gel 15 Gram(s) Oral once PRN Blood Glucose LESS THAN 70 milliGRAM(s)/deciliter  ondansetron   Disintegrating Tablet 4 milliGRAM(s) Oral two times a day PRN Nausea and/or Vomiting  ondansetron Injectable 4 milliGRAM(s) IV Push every 6 hours PRN Nausea and/or Vomiting      REVIEW OF SYSTEMS:                           ALL ROS DONE [ X   ]    CONSTITUTIONAL:  LETHARGIC [   ], FEVER [   ], UNRESPONSIVE [   ]  CVS:  CP  [   ], SOB, [   ], PALPITATIONS [   ], DIZZYNESS [   ]  RS: COUGH [   ], SPUTUM [   ]  GI: ABDOMINAL PAIN [   ], NAUSEA [   ], VOMITINGS [   ], DIARRHEA [   ], CONSTIPATION [   ]  :  DYSURIA [   ], NOCTURIA [   ], INCREASED FREQUENCY [   ], DRIBLING [   ],  SKELETAL: PAINFUL JOINTS [   ], SWOLLEN JOINTS [   ], NECK ACHE [   ], LOW BACK ACHE [   ],  SKIN : ULCERS [   ], RASH [   ], ITCHING [   ]  CNS: HEAD ACHE [   ], DOUBLE VISION [   ], BLURRED VISION [   ], AMS / CONFUSION [   ], SEIZURES [   ], WEAKNESS [   ],TINGLING / NUMBNESS [   ]    PHYSICAL EXAM:    GENERAL APPEARANCE: NO DISTRESS,    ANASARCA ++ [IMPROVED]  HEENT:  NO PALLOR, NO  JVD,  NO   NODES, NECK SUPPLE  CVS: S1 +, S2 +,   RS: AEEB,  OCCASIONAL  RALES +,   CRACKLES+ AT LUNG BASES [IMPROVED]  ABD: SOFT, NT, NO, BS +  EXT: LEFT BKA  SKIN: WARM,   SKELETAL:  ROM ACCEPTABLE  CNS:  AAO X  2-3        RADIOLOGY :    RADIOLOGY AND READINGS REVIEWED          ASSESSMENT :     Hypervolemia    Hypertension    Adrenal insufficiency    CKD (chronic kidney disease)    Anemia    Glaucoma    Coronary artery disease    HLD (hyperlipidemia)    Peripheral vascular disease    Spinal stenosis of lumbosacral region    Hyperparathyroidism    Diabetes mellitus    Diabetic neuropathy    Contracture of hand    Osteoarthritis    Osteoporosis    Vision loss of left eye    ESRD on hemodialysis    Cataract    BPH (benign prostatic hyperplasia)    UTI (urinary tract infection)    Bladder mass    H/O hematuria    Osteoporosis    Vision loss of right eye    Depression    Chronic GERD    Osteomyelitis of vertebra    CHF (congestive heart failure)    Below knee amputation status, left    History of right cataract extraction    History of left cataract extraction    S/P arteriovenous (AV) fistula creation    H/O hematuria    H/O transurethral destruction of bladder lesion    History of excision of mass        PLAN:  HPI:  Patient is 62 year old male from [facility], walks with RW, with PMH of ESRD on HD (TTS), BKA- L w/prosthetic leg, DM, Left eye blindness, CHF, CAD, HTN, HLD, osteoporosis, bronchitis, current smoker, and anemia who presents with complaint of missed dialysis. Last HD 7/22. Pt states he often missed HD sessions.  patient states he missed this HD session due to mild pain in left leg, patient remains able to ambulate. Pt complains of right leg swelling and states he does not make any urine. Patient states he was having mild shortness of breath.  Pt denies any fever, chills, N/V/D, constipation, CP, numbness or tingling (26 Jul 2023 19:20)    # [8/9] MEETING HELD WITH PATIENT, BROTHER ARTEMIO @ [phone] AND SW TEAM. PATIENT W/ HISTORY OF ROUTINE NONCOMPLIANCE W/ LIFE-SUSTAINING TREATMENT [HD], EVALUATIONS AND INTERVENTIONS - COUNSELLED AT LENGTH TO REMAIN COMPLIANT. DISCUSSED THAT PROGNOSIS IS POOR/GRIM GIVEN UNDERLYING MEDICAL CONDITIONS AND GIVEN NONCOMPLIANCE. HE VERBALIZED UNDERSTANDING. REGARDING GOC - PATIENT WISHES FOR FULL CODE. PALLIATIVE CARE CONSULTED. PSYCHIATRY CONSULTED. PATIENT EXPRESSED THAT HE DOES GROW IMPATIENT DURING DIALYSIS SESSIONS AND HAS BEEN LEAVING SESSIONS SOONER THAN PRESCRIBED. IN DISCUSSION THAT RISKS OF DEFERRING COMPLETE HD - INCLUDE BUT ARE NOT LIMITED TO WORSENING CLINICAL CONDITION, ELECTROLYTE ABNORMALITIES, ARRHYTHMIA AND DEATH. HE VERBALIZED UNDERSTANDING. D/W PATIENT THAT REPEATEDLY REFUSING INTERVENTIONS AND ASSESSMENTS IS NOT IN LINE WITH HIS WISHES TO IMPROVE. PATIENT VERBALIZED UNDERSTANDING. DISCUSSED REGARDING CURRENT CLINICAL CONDITION, RECOMMENDED EVALUATIONS AND INTERVENTIONS. ALL QUESTIONS ANSWERED. TEAM HAS OFFERED PATIENT EMOTIONAL SUPPORT AND OFFERED MEDICATION FOR MOOD. OFFERED TO PRE-MEDICATE W/ ANXIOLYTIC PRIOR TO HD. PATIENT IS AGREEABLE.     DISCUSSED THAT PATIENT WILL NEED TO COMPLY WITH HD SESSIONS IN ORDER TO BE MEDICALLY OPTIMIZED FOR DISCHARGE AND FOR SAFE D/C PLANNING. TEAM DISCUSSED OPTIONS OF RETURNING TO [facility] W/ OUTPATIENT HD , IF CARE NEEDS CAN BE SAFELY MET VS. NURSING HOME/[facility] . PATIENT AND BROTHER VERBALIZED UNDERSTANDING.     - SW AND MEDICAL TEAM HAVING ONGOING DISCUSSION WITH PATIENT REGARDING CONSISTENT COMPLIANCE IN ORDER TO RETURN TO HD IN THE OUTPATIENT SETTING  -- CONVEYED THAT PATIENT AND TEAM THAT PATIENT WILL NEED TO CONSISTENTLY COMPLETE FULL HD SESSIONS IN ORDER TO BE CONSIDERED BY OUTPATIENT HD AT NH W/ ONSITE HD OR OUTSIDE HD FACILITY      # ACUTE ON CHRONIC HYPOXIC RESPIRATORY FAILURE S/T PULMONARY EDEMA - S/T INCOMPLETE HD SESSIONS ; ANASARCA  # HYPERKALEMIA  # HX OF COPD + S/P RECENT MULTIFOCAL PNEUMONIA ; R/O ASPIRATION PNEUMONIA  # PULMONARY HYPERTENSION  # UNCONTROLLED HTN  # ESRD ON HD TTS - W/ HX OF NONCOMPLIANCE    - TELEMETRY  - HYDRALAZINE, METOPROLOL AND NORVASC ; PRN IV ANTIHYPERTENSIVE  - LOKELMA  - SUPPLEMENTAL O2  - PLANNED FOR HD  - NEPHROLOGY CONSULT  - PULMONOLOGY CONSULT  - ID CONSULT    - CONTINUES TO REFUSE OR PRE-MATURELY DISCONTINUE SESSIONS OF HD       - [7/30] - OVERNIGHT RAPID RESPONSE S/T HYPOXIA - SELF-LIMITED - PATIENT IMPROVED S/P O2 SUPPLEMENT  - S/P CEFEPIME + FLAGYLL, BCX - NGTD      # RECURRENT INTRACTABLE NAUSEA/VOMITING, ? COFFEE GROUND EMESIS  # GASTROPARESIS  # HISTORY OF ESOPHAGITIS AND DUODENITIS [1/2021], HX OF GASTROPARESIS W/ EVIDENCE OF THICKENED ESOPHAGUS ON PREVIOUS CT SCAN   # PANCREAS W/ DOUBLE DUCT SIGN    - MONITORING HGB, PLACED ON PPI BID , CARAFATE QID  - PRN ANTIEMETICS  ; S/P DOSE OF REGLAN [WITH MINIMAL IMPROVEMENT]  - MONITORING FOR SYMPTOMS  - WHILE ON DIET PATIENT REPEATEDLY COUNSELLED TO CHEW FOOD CAUTIOUSLY AND CONSUME SMALL BITES W/ REGULAR CLEARING WITH WATER BETWEEN BITES    - ADVANCE DIET AS TOLERATED  - NOTED CT A/P    - S/P RECENT PRBC TRANSFUSION     - GI CONSULT  - SURGERY CONSULT    - [8/14] - EPISODE OF NAUSEA/VOMITING OVER THE WEEKEND - IMPROVED    # LESS LIKELY CHOLECYSTITIS  - S/P ABX  - NOTED CT A/P  - HIDA SCAN - NEGATIVE  - SURGERY CONSULT    # ELEVATED TROPONINS - ? DEMAND ISCHEMIA    - S/P TELEMETRY  - TREND TROPONINS  - ECHO - TRACE MR, MODERATELY INCREASED LV WALL THICKNESS, NORMAL LV SYSTOLIC FUNCTION, SEVERE PULMONARY HTN  - CARDIOLOGY CONSULT    # EPISODE OF HYPOGLYCEMIA - IMPROVED  # GASTROPARESIS  # UNDERLYING DM  - SSI + FS  - COUNSELLED TO COMPLY WITH HD TO REDUCE UREMIA    - REPEATEDLY COUNSELLED TO COMPLY WITH FINGERSTICKS      # DEPRESSION  - PLACED ON ZOLOFT  - PSYCHIATRY CONSULT    # PATIENT UNDERGOING OUTPATIENT WORKUP FOR CENTRAL VENOUS STENOSIS THROUGH NEPHROLOGY TEAM  - ? s/p STENT PLACEMENT    # ANEMIA OF CKD  # HX OF PANCYTOPENIA   - TREND HGB, TRANSFUSION THRESHOLD HGB < 7; PATIENT STILL INTERMITTENTLY REFUSING BLOODWORK, COUNSELLED TO COMPLY  - TYPE AND SCREEN  - ON EPO  - DENIES RECENT HEMATEMESIS, MELENA, HEMATOCHEZIA    - S/P RECENT PRBC TRANSFUSION  - S/P PRBC TRANSFUSION 7/29    - PPI BID, CARAFATE QID    # SEVERE PROTEIN CALORIE MALNUTRITION, FAILURE TO THRIVE   -  TEMPORAL WASTING, LOSS OF MUSCLE MASS FROM SHOULDER AND HIP GIRDLE  - NUTRITIONAL SUPPLEMENT    # HLD  # PARTIALLY BLIND  # S/P LEFT BKA  # HX OF PVD  # LS SPINAL STENOSIS  # GI AND DVT PPX     Patient is a 62y old  Male who presents with a chief complaint of Missed dialysis (20 Aug 2023 22:49)    PATIENT IS SEEN AND EXAMINED IN MEDICAL FLOOR.      ALLERGIES:  No Known Drug Allergies  fish (Rash)  liver (Anaphylaxis)      VITALS:    Vital Signs Last 24 Hrs  T(C): 36.7 (21 Aug 2023 05:30), Max: 36.9 (20 Aug 2023 14:12)  T(F): 98.1 (21 Aug 2023 05:30), Max: 98.4 (20 Aug 2023 14:12)  HR: 78 (21 Aug 2023 05:30) (58 - 92)  BP: 120/52 (21 Aug 2023 05:30) (91/35 - 143/52)  BP(mean): 72 (21 Aug 2023 01:28) (71 - 72)  RR: 16 (21 Aug 2023 05:30) (16 - 20)  SpO2: 100% (21 Aug 2023 05:30) (96% - 100%)    Parameters below as of 21 Aug 2023 05:30  Patient On (Oxygen Delivery Method): room air        LABS:              CAPILLARY BLOOD GLUCOSE      POCT Blood Glucose.: 158 mg/dL (21 Aug 2023 07:39)  POCT Blood Glucose.: 179 mg/dL (20 Aug 2023 21:28)  POCT Blood Glucose.: 236 mg/dL (20 Aug 2023 16:36)          Creatinine Trend: 12.30<--, 10.70<--, 11.70<--, 12.90<--, 11.70<--, 17.50<--  I&O's Summary          .Blood Blood-Peripheral  05-28 @ 14:07   No Growth Final  --  --          MEDICATIONS:    MEDICATIONS  (STANDING):  albuterol    90 MICROgram(s) HFA Inhaler 2 Puff(s) Inhalation every 6 hours  amLODIPine   Tablet 10 milliGRAM(s) Oral daily  aspirin enteric coated 81 milliGRAM(s) Oral daily  atorvastatin 40 milliGRAM(s) Oral at bedtime  budesonide 160 MICROgram(s)/formoterol 4.5 MICROgram(s) Inhaler 2 Puff(s) Inhalation two times a day  chlorhexidine 2% Cloths 1 Application(s) Topical daily  dextrose 5%. 1000 milliLiter(s) (50 mL/Hr) IV Continuous <Continuous>  dextrose 5%. 1000 milliLiter(s) (100 mL/Hr) IV Continuous <Continuous>  dextrose 50% Injectable 25 Gram(s) IV Push once  dextrose 50% Injectable 12.5 Gram(s) IV Push once  dextrose 50% Injectable 25 Gram(s) IV Push once  epoetin beverley (PROCRIT) Injectable 49505 Unit(s) IV Push <User Schedule>  epoetin beverley-epbx (RETACRIT) Injectable 92144 Unit(s) IV Push <User Schedule>  folic acid 1 milliGRAM(s) Oral daily  furosemide    Tablet 40 milliGRAM(s) Oral daily  glucagon  Injectable 1 milliGRAM(s) IntraMuscular once  hydrALAZINE 25 milliGRAM(s) Oral three times a day  lactobacillus acidophilus 1 Tablet(s) Oral two times a day with meals  latanoprost 0.005% Ophthalmic Solution 1 Drop(s) Both EYES at bedtime  levothyroxine 25 MICROGram(s) Oral daily  lidocaine   4% Patch 2 Patch Transdermal daily  LORazepam     Tablet 2 milliGRAM(s) Oral <User Schedule>  melatonin 3 milliGRAM(s) Oral at bedtime  metoprolol succinate ER 50 milliGRAM(s) Oral daily  pantoprazole    Tablet 40 milliGRAM(s) Oral before breakfast  sertraline 200 milliGRAM(s) Oral daily  sevelamer carbonate 800 milliGRAM(s) Oral three times a day with meals  sucralfate 1 Gram(s) Oral four times a day      MEDICATIONS  (PRN):  acetaminophen     Tablet .. 1000 milliGRAM(s) Oral every 8 hours PRN Moderate Pain (4 - 6)  dextrose Oral Gel 15 Gram(s) Oral once PRN Blood Glucose LESS THAN 70 milliGRAM(s)/deciliter  ondansetron   Disintegrating Tablet 4 milliGRAM(s) Oral two times a day PRN Nausea and/or Vomiting  ondansetron Injectable 4 milliGRAM(s) IV Push every 6 hours PRN Nausea and/or Vomiting      REVIEW OF SYSTEMS:                           ALL ROS DONE [ X   ]    CONSTITUTIONAL:  LETHARGIC [   ], FEVER [   ], UNRESPONSIVE [   ]  CVS:  CP  [   ], SOB, [   ], PALPITATIONS [   ], DIZZYNESS [   ]  RS: COUGH [   ], SPUTUM [   ]  GI: ABDOMINAL PAIN [   ], NAUSEA [   ], VOMITINGS [   ], DIARRHEA [   ], CONSTIPATION [   ]  :  DYSURIA [   ], NOCTURIA [   ], INCREASED FREQUENCY [   ], DRIBLING [   ],  SKELETAL: PAINFUL JOINTS [   ], SWOLLEN JOINTS [   ], NECK ACHE [   ], LOW BACK ACHE [   ],  SKIN : ULCERS [   ], RASH [   ], ITCHING [   ]  CNS: HEAD ACHE [   ], DOUBLE VISION [   ], BLURRED VISION [   ], AMS / CONFUSION [   ], SEIZURES [   ], WEAKNESS [   ],TINGLING / NUMBNESS [   ]    PHYSICAL EXAM:    GENERAL APPEARANCE: NO DISTRESS,    ANASARCA ++ [IMPROVED]  HEENT:  NO PALLOR, NO  JVD,  NO   NODES, NECK SUPPLE  CVS: S1 +, S2 +,   RS: AEEB,  OCCASIONAL  RALES +,   CRACKLES+ AT LUNG BASES [IMPROVED]  ABD: SOFT, NT, NO, BS +  EXT: LEFT BKA  SKIN: WARM,   SKELETAL:  ROM ACCEPTABLE  CNS:  AAO X  2-3        RADIOLOGY :    RADIOLOGY AND READINGS REVIEWED          ASSESSMENT :     Hypervolemia    Hypertension    Adrenal insufficiency    CKD (chronic kidney disease)    Anemia    Glaucoma    Coronary artery disease    HLD (hyperlipidemia)    Peripheral vascular disease    Spinal stenosis of lumbosacral region    Hyperparathyroidism    Diabetes mellitus    Diabetic neuropathy    Contracture of hand    Osteoarthritis    Osteoporosis    Vision loss of left eye    ESRD on hemodialysis    Cataract    BPH (benign prostatic hyperplasia)    UTI (urinary tract infection)    Bladder mass    H/O hematuria    Osteoporosis    Vision loss of right eye    Depression    Chronic GERD    Osteomyelitis of vertebra    CHF (congestive heart failure)    Below knee amputation status, left    History of right cataract extraction    History of left cataract extraction    S/P arteriovenous (AV) fistula creation    H/O hematuria    H/O transurethral destruction of bladder lesion    History of excision of mass        PLAN:  HPI:  Patient is 62 year old male from [facility], walks with RW, with PMH of ESRD on HD (TTS), BKA- L w/prosthetic leg, DM, Left eye blindness, CHF, CAD, HTN, HLD, osteoporosis, bronchitis, current smoker, and anemia who presents with complaint of missed dialysis. Last HD 7/22. Pt states he often missed HD sessions.  patient states he missed this HD session due to mild pain in left leg, patient remains able to ambulate. Pt complains of right leg swelling and states he does not make any urine. Patient states he was having mild shortness of breath.  Pt denies any fever, chills, N/V/D, constipation, CP, numbness or tingling (26 Jul 2023 19:20)    # [8/9] MEETING HELD WITH PATIENT, BROTHER ARTEMIO @ [phone] AND SW TEAM. PATIENT W/ HISTORY OF ROUTINE NONCOMPLIANCE W/ LIFE-SUSTAINING TREATMENT [HD], EVALUATIONS AND INTERVENTIONS - COUNSELLED AT LENGTH TO REMAIN COMPLIANT. DISCUSSED THAT PROGNOSIS IS POOR/GRIM GIVEN UNDERLYING MEDICAL CONDITIONS AND GIVEN NONCOMPLIANCE. HE VERBALIZED UNDERSTANDING. REGARDING GOC - PATIENT WISHES FOR FULL CODE. PALLIATIVE CARE CONSULTED. PSYCHIATRY CONSULTED. PATIENT EXPRESSED THAT HE DOES GROW IMPATIENT DURING DIALYSIS SESSIONS AND HAS BEEN LEAVING SESSIONS SOONER THAN PRESCRIBED. IN DISCUSSION THAT RISKS OF DEFERRING COMPLETE HD - INCLUDE BUT ARE NOT LIMITED TO WORSENING CLINICAL CONDITION, ELECTROLYTE ABNORMALITIES, ARRHYTHMIA AND DEATH. HE VERBALIZED UNDERSTANDING. D/W PATIENT THAT REPEATEDLY REFUSING INTERVENTIONS AND ASSESSMENTS IS NOT IN LINE WITH HIS WISHES TO IMPROVE. PATIENT VERBALIZED UNDERSTANDING. DISCUSSED REGARDING CURRENT CLINICAL CONDITION, RECOMMENDED EVALUATIONS AND INTERVENTIONS. ALL QUESTIONS ANSWERED. TEAM HAS OFFERED PATIENT EMOTIONAL SUPPORT AND OFFERED MEDICATION FOR MOOD. OFFERED TO PRE-MEDICATE W/ ANXIOLYTIC PRIOR TO HD. PATIENT IS AGREEABLE.     DISCUSSED THAT PATIENT WILL NEED TO COMPLY WITH HD SESSIONS IN ORDER TO BE MEDICALLY OPTIMIZED FOR DISCHARGE AND FOR SAFE D/C PLANNING. TEAM DISCUSSED OPTIONS OF RETURNING TO [facility] W/ OUTPATIENT HD , IF CARE NEEDS CAN BE SAFELY MET VS. NURSING HOME/[facility] . PATIENT AND BROTHER VERBALIZED UNDERSTANDING.     - SW AND MEDICAL TEAM HAVING ONGOING DISCUSSION WITH PATIENT REGARDING CONSISTENT COMPLIANCE IN ORDER TO RETURN TO HD IN THE OUTPATIENT SETTING  -- CONVEYED THAT PATIENT AND TEAM THAT PATIENT WILL NEED TO CONSISTENTLY COMPLETE FULL HD SESSIONS IN ORDER TO BE CONSIDERED BY OUTPATIENT HD AT NH W/ ONSITE HD OR OUTSIDE HD FACILITY      # ACUTE ON CHRONIC HYPOXIC RESPIRATORY FAILURE S/T PULMONARY EDEMA - S/T INCOMPLETE HD SESSIONS ; ANASARCA  # HYPERKALEMIA  # HX OF COPD + S/P RECENT MULTIFOCAL PNEUMONIA ; R/O ASPIRATION PNEUMONIA  # PULMONARY HYPERTENSION  # UNCONTROLLED HTN  # ESRD ON HD TTS - W/ HX OF NONCOMPLIANCE    - TELEMETRY  - HYDRALAZINE, METOPROLOL AND NORVASC ; PRN IV ANTIHYPERTENSIVE  - LOKELMA  - SUPPLEMENTAL O2  - PLANNED FOR HD  - NEPHROLOGY CONSULT  - PULMONOLOGY CONSULT  - ID CONSULT    - CONTINUES TO REFUSE OR PRE-MATURELY DISCONTINUE SESSIONS OF HD       - [7/30] - OVERNIGHT RAPID RESPONSE S/T HYPOXIA - SELF-LIMITED - PATIENT IMPROVED S/P O2 SUPPLEMENT  - S/P CEFEPIME + FLAGYLL, BCX - NGTD      # RECURRENT INTRACTABLE NAUSEA/VOMITING, ? COFFEE GROUND EMESIS  # GASTROPARESIS  # HISTORY OF ESOPHAGITIS AND DUODENITIS [1/2021], HX OF GASTROPARESIS W/ EVIDENCE OF THICKENED ESOPHAGUS ON PREVIOUS CT SCAN   # PANCREAS W/ DOUBLE DUCT SIGN    - MONITORING HGB, PLACED ON PPI BID , CARAFATE QID  - PRN ANTIEMETICS  ; S/P DOSE OF REGLAN [WITH MINIMAL IMPROVEMENT]  - MONITORING FOR SYMPTOMS  - WHILE ON DIET PATIENT REPEATEDLY COUNSELLED TO CHEW FOOD CAUTIOUSLY AND CONSUME SMALL BITES W/ REGULAR CLEARING WITH WATER BETWEEN BITES    - ADVANCE DIET AS TOLERATED  - NOTED CT A/P    - S/P RECENT PRBC TRANSFUSION     - GI CONSULT  - SURGERY CONSULT    - [8/14] - EPISODE OF NAUSEA/VOMITING OVER THE WEEKEND - IMPROVED    # LESS LIKELY CHOLECYSTITIS  - S/P ABX  - NOTED CT A/P  - HIDA SCAN - NEGATIVE  - SURGERY CONSULT    # ELEVATED TROPONINS - ? DEMAND ISCHEMIA    - S/P TELEMETRY  - TREND TROPONINS  - ECHO - TRACE MR, MODERATELY INCREASED LV WALL THICKNESS, NORMAL LV SYSTOLIC FUNCTION, SEVERE PULMONARY HTN  - CARDIOLOGY CONSULT    # EPISODE OF HYPOGLYCEMIA - IMPROVED  # GASTROPARESIS  # UNDERLYING DM  - SSI + FS  - COUNSELLED TO COMPLY WITH HD TO REDUCE UREMIA    - REPEATEDLY COUNSELLED TO COMPLY WITH FINGERSTICKS      # DEPRESSION  - PLACED ON ZOLOFT  - PSYCHIATRY CONSULT    # PATIENT UNDERGOING OUTPATIENT WORKUP FOR CENTRAL VENOUS STENOSIS THROUGH NEPHROLOGY TEAM  - ? s/p STENT PLACEMENT    # ANEMIA OF CKD  # HX OF PANCYTOPENIA   - TREND HGB, TRANSFUSION THRESHOLD HGB < 7; PATIENT STILL INTERMITTENTLY REFUSING BLOODWORK, COUNSELLED TO COMPLY  - TYPE AND SCREEN  - ON EPO  - DENIES RECENT HEMATEMESIS, MELENA, HEMATOCHEZIA    - S/P RECENT PRBC TRANSFUSION  - S/P PRBC TRANSFUSION 7/29    - PPI BID, CARAFATE QID    # SEVERE PROTEIN CALORIE MALNUTRITION, FAILURE TO THRIVE   -  TEMPORAL WASTING, LOSS OF MUSCLE MASS FROM SHOULDER AND HIP GIRDLE  - NUTRITIONAL SUPPLEMENT    # HLD  # PARTIALLY BLIND  # S/P LEFT BKA  # HX OF PVD  # LS SPINAL STENOSIS  # GI AND DVT PPX

## 2023-08-21 NOTE — PROGRESS NOTE ADULT - PROBLEM SELECTOR PLAN 3
H/o ESRD on HD (T/Th/Sat)  - Pt refused HD on 8/5.  Completed 8 minutes on HD on 8/8.  Completed 2.5h HD on 8/9.    - Last HD 8/18.    - As discussed during family mtg, if pt's returning to [place] will need to complete full 3h sessions at dialysis.    - C/w Premedication prior to HD-Ativan 2mg PO standing predialysis   - S/p Hepatitis panel  - Nephro-Dr. Mosher, following H/o ESRD on HD (T/Th/Sat)  - Last HD 8/21  - S/p Hepatitis panel  - Nephro-Dr. Mosher, following

## 2023-08-21 NOTE — PROGRESS NOTE ADULT - ASSESSMENT
# ESRD. S/P  HD   severe metabolic acidosis from ESRD.  had UF   D/W pt compliance with HD   pre-HD Ativan   # anemia of CKD. epogen on HD. transfuse for HBG <7  #CKDMBD. cont sevelamer  # HTN. blood pressure at goal     D/C planning

## 2023-08-21 NOTE — PROGRESS NOTE ADULT - PROBLEM SELECTOR PLAN 1
- Most likely Acute on chronic combined HF d/t missed HD resulting in volume overload & RLL HAP   - CT A/P noted for RLL PNA.  S/p HAP treatment.  Completed tx for HAP s/p Cefepime & Flagyl.    - S/p Repeat CXR showed worsening HF  - C/w Oxygen supplementation PRN   - C/w PRN albuterol & Symbicort   - Nephro-Dr. Mosher, following   - Cardiology-Dr. Still following   - Pulmonology-Dr. Loredo following   - ID-Dr. Newby following - Most likely Acute on chronic combined HF d/t missed HD resulting in volume overload & RLL HAP   - CT A/P noted for RLL PNA.  S/p HAP treatment.  Completed tx for HAP s/p Cefepime & Flagyl.    - SPO2 100% RA  - C/w PRN albuterol & Symbicort   - Nephro-Dr. Mosher, following   - Cardiology-Dr. Still following   - Pulmonology-Dr. Loredo following   - ID-Dr. Newby following

## 2023-08-21 NOTE — PROGRESS NOTE ADULT - PROBLEM SELECTOR PLAN 12
- Continue to hold heparin due to ongoing anemia and concern for possible gastric ulcer  - C/w SCDs  - C/w Protonix for GI ppx - Continue to hold heparin due to ongoing anemia and concern for possible gastric ulcer  - C/w SCDs  - C/w Protonix for GI ppx    Discharge Planning  pending acceptance to [facility]

## 2023-08-21 NOTE — PROGRESS NOTE ADULT - SUBJECTIVE AND OBJECTIVE BOX
Time of Visit:  Patient seen and examined.     MEDICATIONS  (STANDING):  albuterol    90 MICROgram(s) HFA Inhaler 2 Puff(s) Inhalation every 6 hours  amLODIPine   Tablet 10 milliGRAM(s) Oral daily  aspirin enteric coated 81 milliGRAM(s) Oral daily  atorvastatin 40 milliGRAM(s) Oral at bedtime  budesonide 160 MICROgram(s)/formoterol 4.5 MICROgram(s) Inhaler 2 Puff(s) Inhalation two times a day  chlorhexidine 2% Cloths 1 Application(s) Topical daily  dextrose 5%. 1000 milliLiter(s) (50 mL/Hr) IV Continuous <Continuous>  dextrose 5%. 1000 milliLiter(s) (100 mL/Hr) IV Continuous <Continuous>  dextrose 50% Injectable 25 Gram(s) IV Push once  dextrose 50% Injectable 12.5 Gram(s) IV Push once  dextrose 50% Injectable 25 Gram(s) IV Push once  epoetin beverley (PROCRIT) Injectable 97244 Unit(s) IV Push <User Schedule>  epoetin beverley-epbx (RETACRIT) Injectable 44660 Unit(s) IV Push <User Schedule>  folic acid 1 milliGRAM(s) Oral daily  furosemide    Tablet 40 milliGRAM(s) Oral daily  glucagon  Injectable 1 milliGRAM(s) IntraMuscular once  hydrALAZINE 25 milliGRAM(s) Oral three times a day  lactobacillus acidophilus 1 Tablet(s) Oral two times a day with meals  latanoprost 0.005% Ophthalmic Solution 1 Drop(s) Both EYES at bedtime  levothyroxine 25 MICROGram(s) Oral daily  lidocaine   4% Patch 2 Patch Transdermal daily  LORazepam     Tablet 2 milliGRAM(s) Oral <User Schedule>  melatonin 3 milliGRAM(s) Oral at bedtime  metoprolol succinate ER 50 milliGRAM(s) Oral daily  pantoprazole    Tablet 40 milliGRAM(s) Oral before breakfast  sertraline 200 milliGRAM(s) Oral daily  sevelamer carbonate 800 milliGRAM(s) Oral three times a day with meals  sucralfate 1 Gram(s) Oral four times a day      MEDICATIONS  (PRN):  acetaminophen     Tablet .. 1000 milliGRAM(s) Oral every 8 hours PRN Moderate Pain (4 - 6)  dextrose Oral Gel 15 Gram(s) Oral once PRN Blood Glucose LESS THAN 70 milliGRAM(s)/deciliter  ondansetron   Disintegrating Tablet 4 milliGRAM(s) Oral two times a day PRN Nausea and/or Vomiting  ondansetron Injectable 4 milliGRAM(s) IV Push every 6 hours PRN Nausea and/or Vomiting       Medications up to date at time of exam.    ROS; No fever, chills, cough, congestion on exam.   PHYSICAL EXAMINATION:    Vital Signs Last 24 Hrs  T(C): 36.7 (21 Aug 2023 05:30), Max: 36.9 (20 Aug 2023 14:12)  T(F): 98.1 (21 Aug 2023 05:30), Max: 98.4 (20 Aug 2023 14:12)  HR: 85 (21 Aug 2023 09:15) (58 - 92)  BP: 112/57 (21 Aug 2023 09:15) (91/35 - 143/52)  BP(mean): 72 (21 Aug 2023 01:28) (71 - 72)  RR: 16 (21 Aug 2023 05:30) (16 - 20)  SpO2: 100% (21 Aug 2023 09:15) (96% - 100%)    Parameters below as of 21 Aug 2023 05:30  Patient On (Oxygen Delivery Method): room air       (if applicable)    General : Alert and oriented. No acute distress.     HEENT: Head is normocephalic and atraumatic. No nasal tenderness. Mucous membranes are moist.     NECK: Supple, no palpable adenopathy.    LUNGS: Clear to auscultation bilaterally with no wheezing, rales, or rhonchi. No use of accessory muscle .      HEART: S1 S2 Regular rate and no click / rub.     ABDOMEN: Soft, nontender, and nondistended. Active bowel sounds.     EXTREMITIES: Without any cyanosis, clubbing, rash, lesions or edema.    NEUROLOGIC: Awake, alert, oriented.     SKIN: Warm and moist . Non diaphoretic.       LABS:                        9.9    2.28  )-----------( x        ( 21 Aug 2023 12:00 )             32.2     08-21    123<L>  |  97  |  58<H>  ----------------------------<  200<H>  5.6<H>   |  12<L>  |  12.00<H>    Ca    7.2<L>      21 Aug 2023 12:00  Phos  6.6     08-21  Mg     2.8     08-21    TPro  6.7  /  Alb  2.9<L>  /  TBili  0.4  /  DBili  x   /  AST  50<H>  /  ALT  73<H>  /  AlkPhos  134<H>  08-21      Urinalysis Basic - ( 21 Aug 2023 12:00 )    Color: x / Appearance: x / SG: x / pH: x  Gluc: 200 mg/dL / Ketone: x  / Bili: x / Urobili: x   Blood: x / Protein: x / Nitrite: x   Leuk Esterase: x / RBC: x / WBC x   Sq Epi: x / Non Sq Epi: x / Bacteria: x                      MICROBIOLOGY: (if applicable)    RADIOLOGY & ADDITIONAL STUDIES:  EKG:   CXR:  ECHO:    IMPRESSION: 62y Male 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      Osteoarthritis      Vision loss of left eye  blind      ESRD on hemodialysis  T/Th/S      Cataract  both eyes - hx of sx done      BPH (benign prostatic hyperplasia)      UTI (urinary tract infection)  hx of      Bladder mass  hx of      H/O hematuria      Osteoporosis      Vision loss of right eye  decreased      Depression      Chronic GERD      Osteomyelitis of vertebra      CHF (congestive heart failure)      Below knee amputation status, left  2012- pt is wearing prostesis      History of right cataract extraction      History of left cataract extraction      S/P arteriovenous (AV) fistula creation  right arm brachiocephalic arteriovenous fistula on 11/08/2018      H/O hematuria  s/p bladder bx and fulguration 2/25/2020      H/O transurethral destruction of bladder lesion  2020      History of excision of mass  back mass on 03/31/2021      Impression: This is a 62 yr old man  from [facility] with ESRD on HD ( T-Th-Sat ). Current smoker . Presented to ED due to Missed Dialysis, last HD 07-22-23 . Per Patient stated that he often missed HD sessions due to Mild left leg pain and right leg swelling . S/p RRT for SOB with Hypoxia due to Acute Hypoxic Respiratory Failure secondary to Pulmonary edema/ Fluid overload due to Missed Dialysis . CT Abdomen with Small Bilateral Pleural Effusions, Rt Lower Lung with groundglass opacity. No bowel obstruction. Small Gall Stone.   Vomiting due to diabetic gastroparesis vs acute cholecystitis       Suggestions:  O2 saturation 100% room air. So far been saturating good room air.    Neph suggest premedicate with ativan before HD    Limit fluid intake   Continue Albuterol 90 mcg 2 puffs Q 6 Hours.   Continue Symbicort 160-4.5 mcg 2 Puffs Twice Daily .    Reinforced the importance of compliance to Dialysis schedule.

## 2023-08-21 NOTE — PROGRESS NOTE ADULT - SUBJECTIVE AND OBJECTIVE BOX
NP Note discussed with  Primary Attending    Patient is a 62y old  Male who presents with a chief complaint of Missed dialysis (21 Aug 2023 11:25)      INTERVAL HPI/OVERNIGHT EVENTS: no new complaints    MEDICATIONS  (STANDING):  albuterol    90 MICROgram(s) HFA Inhaler 2 Puff(s) Inhalation every 6 hours  amLODIPine   Tablet 10 milliGRAM(s) Oral daily  aspirin enteric coated 81 milliGRAM(s) Oral daily  atorvastatin 40 milliGRAM(s) Oral at bedtime  budesonide 160 MICROgram(s)/formoterol 4.5 MICROgram(s) Inhaler 2 Puff(s) Inhalation two times a day  chlorhexidine 2% Cloths 1 Application(s) Topical daily  dextrose 5%. 1000 milliLiter(s) (50 mL/Hr) IV Continuous <Continuous>  dextrose 5%. 1000 milliLiter(s) (100 mL/Hr) IV Continuous <Continuous>  dextrose 50% Injectable 25 Gram(s) IV Push once  dextrose 50% Injectable 25 Gram(s) IV Push once  dextrose 50% Injectable 12.5 Gram(s) IV Push once  epoetin beverley (PROCRIT) Injectable 26115 Unit(s) IV Push <User Schedule>  epoetin beverley-epbx (RETACRIT) Injectable 38982 Unit(s) IV Push <User Schedule>  folic acid 1 milliGRAM(s) Oral daily  furosemide    Tablet 40 milliGRAM(s) Oral daily  glucagon  Injectable 1 milliGRAM(s) IntraMuscular once  hydrALAZINE 25 milliGRAM(s) Oral three times a day  lactobacillus acidophilus 1 Tablet(s) Oral two times a day with meals  latanoprost 0.005% Ophthalmic Solution 1 Drop(s) Both EYES at bedtime  levothyroxine 25 MICROGram(s) Oral daily  lidocaine   4% Patch 2 Patch Transdermal daily  LORazepam     Tablet 2 milliGRAM(s) Oral <User Schedule>  melatonin 3 milliGRAM(s) Oral at bedtime  metoprolol succinate ER 50 milliGRAM(s) Oral daily  pantoprazole    Tablet 40 milliGRAM(s) Oral before breakfast  sertraline 200 milliGRAM(s) Oral daily  sevelamer carbonate 800 milliGRAM(s) Oral three times a day with meals  sucralfate 1 Gram(s) Oral four times a day    MEDICATIONS  (PRN):  acetaminophen     Tablet .. 1000 milliGRAM(s) Oral every 8 hours PRN Moderate Pain (4 - 6)  dextrose Oral Gel 15 Gram(s) Oral once PRN Blood Glucose LESS THAN 70 milliGRAM(s)/deciliter  ondansetron   Disintegrating Tablet 4 milliGRAM(s) Oral two times a day PRN Nausea and/or Vomiting  ondansetron Injectable 4 milliGRAM(s) IV Push every 6 hours PRN Nausea and/or Vomiting      __________________________________________________  REVIEW OF SYSTEMS:    CONSTITUTIONAL: No fever,   EYES: no acute visual disturbances  NECK: No pain or stiffness  RESPIRATORY: No cough; No shortness of breath  CARDIOVASCULAR: No chest pain, no palpitations  GASTROINTESTINAL: No pain. No nausea or vomiting; No diarrhea   NEUROLOGICAL: No headache or numbness, no tremors  MUSCULOSKELETAL: No joint pain, no muscle pain  GENITOURINARY: no dysuria, no frequency, no hesitancy  PSYCHIATRY: no depression , no anxiety  ALL OTHER  ROS negative        Vital Signs Last 24 Hrs  T(C): 36.6 (21 Aug 2023 12:05), Max: 36.7 (20 Aug 2023 22:21)  T(F): 97.9 (21 Aug 2023 12:05), Max: 98.1 (20 Aug 2023 22:21)  HR: 85 (21 Aug 2023 12:05) (58 - 92)  BP: 118/60 (21 Aug 2023 12:05) (91/35 - 120/52)  BP(mean): 72 (21 Aug 2023 01:28) (71 - 72)  RR: 17 (21 Aug 2023 12:05) (16 - 19)  SpO2: 100% (21 Aug 2023 12:05) (96% - 100%)    Parameters below as of 21 Aug 2023 12:05  Patient On (Oxygen Delivery Method): room air        ________________________________________________  PHYSICAL EXAM:  GENERAL: NAD  HEENT: Normocephalic;  conjunctivae and sclerae clear; moist mucous membranes;   NECK : supple  CHEST/LUNG: Clear to auscultation bilaterally with good air entry   HEART: S1 S2  regular; no murmurs, gallops or rubs  ABDOMEN: Soft, Nontender, Nondistended; Bowel sounds present  EXTREMITIES: right UE AVF, no cyanosis; no edema; no calf tenderness  SKIN: warm and dry; no rash  NERVOUS SYSTEM:  Awake and alert; Oriented  to person    _________________________________________________  LABS:                        9.9    2.28  )-----------( 96       ( 21 Aug 2023 12:00 )             32.2     08-21    123<L>  |  97  |  58<H>  ----------------------------<  200<H>  5.6<H>   |  12<L>  |  12.00<H>    Ca    7.2<L>      21 Aug 2023 12:00  Phos  6.6     08-21  Mg     2.8     08-21    TPro  6.7  /  Alb  2.9<L>  /  TBili  0.4  /  DBili  x   /  AST  50<H>  /  ALT  73<H>  /  AlkPhos  134<H>  08-21      Urinalysis Basic - ( 21 Aug 2023 12:00 )    Color: x / Appearance: x / SG: x / pH: x  Gluc: 200 mg/dL / Ketone: x  / Bili: x / Urobili: x   Blood: x / Protein: x / Nitrite: x   Leuk Esterase: x / RBC: x / WBC x   Sq Epi: x / Non Sq Epi: x / Bacteria: x      CAPILLARY BLOOD GLUCOSE      POCT Blood Glucose.: 158 mg/dL (21 Aug 2023 07:39)  POCT Blood Glucose.: 179 mg/dL (20 Aug 2023 21:28)  POCT Blood Glucose.: 236 mg/dL (20 Aug 2023 16:36)        RADIOLOGY & ADDITIONAL TESTS:  < from: Xray Chest 1 View- PORTABLE-Urgent (Xray Chest 1 View- PORTABLE-Urgent .) (08.20.23 @ 11:18) >  ACC: [id number] EXAM:  XR CHEST PORTABLE URGENT 1V   ORDERED BY: FLORY GONZALEZ DATE:  08/20/2023          INTERPRETATION:  Clinical history: Shortness of breath    COMPARISON: 7/30/2023    A single frontal view the chest and straightno evidence of effusion,   consolidation or pneumothorax. Cardiomegaly is again seen. Surgical clips   are seen by the right elbow and vascular stent is seen in the projection   of the right shoulder.    IMPRESSION:    No acute pulmonary disease.    --- End of Report ---    < end of copied text >  < from: CT Abdomen and Pelvis No Cont (07.30.23 @ 13:35) >  ACC: [id number] EXAM:  CT ABDOMEN AND PELVIS   ORDERED BY: ALEXANDER MATIAS     PROCEDURE DATE:  07/30/2023          INTERPRETATION:  CLINICAL INFORMATION: Worsening nausea and vomiting with   dark colored emesis.    COMPARISON: 7/26/2023    CONTRAST/COMPLICATIONS:  IV Contrast: NONE  Oral Contrast: NONE  Complications: None reported at time of study completion    PROCEDURE:  CT of the Abdomen and Pelvis was performed.  Sagittal and coronal reformats were performed.    FINDINGS: The examination is limited by lack of IV contrast.  LOWER CHEST: Small bilateral pleural effusions. Small bilateral   atelectasis. Nonspecific groundglass densities in the right lower lung   zone; clinical correlation with pneumonia is suggested. Cardiomegaly and   mild pericardial effusion, unchanged.    LIVER: Within normal limits.  BILE DUCTS: Dilated common bile duct again noted.  GALLBLADDER: Small gallstones. Nonspecific trace pericholecystic fluid.  SPLEEN: Within normal limits.  PANCREAS: Dilated main pancreaticduct is again noted.  ADRENALS: The right adrenal appears unremarkable. Nonspecific left   adrenal thickening.  KIDNEYS/URETERS: Within normal limits except for a few small hypodense   lesions in the kidneys bilaterally, representing probable cysts.    BLADDER: Underdistended.  REPRODUCTIVE ORGANS: The prostate and seminal vesicles appear grossly   unremarkable.    BOWEL: No bowel obstruction. Appendix unremarkable. Nonspecific mild   distal esophageal mural thickening.  PERITONEUM: Small ascites. No free air.  VESSELS: Calcified atherosclerotic disease.  RETROPERITONEUM/LYMPH NODES: No lymphadenopathy.  ABDOMINAL WALL: Anasarca again noted.  BONES: No acute CT findings.    IMPRESSION: Small gallstones. Nonspecific trace pericholecystic fluid..   If there is a clinical suspicion for acute cholecystitis, gallbladder   ultrasound/HIDA scan may be pursued for further evaluation.    Stable dilated common bile duct and main pancreatic duct. Please see   previous MRCP dated 2/23/2023.    No bowel obstruction. Appendix unremarkable. Nonspecific mild distal   esophageal mural thickening.    Small ascites.    Anasarca.    Small bilateral pleural effusions. Small bilateral atelectasis.   Nonspecific groundglass densities in the right lower lungzone; clinical   correlation with pneumonia is suggested.    Cardiomegaly and mild pericardial effusion, unchanged.    --- End of Report ---    < end of copied text >  < from: CT Chest No Cont (07.26.23 @ 18:09) >  ACC: [id number] EXAM:  CT ABDOMEN AND PELVIS   ORDERED BY: JAD MATOS     ACC: [id number] EXAM:  CT CHEST   ORDERED BY: JAD MATOS     PROCEDURE DATE:  07/26/2023          INTERPRETATION:  CLINICAL INFORMATION: ESRD on hemodialysis. Fluid   overload.    COMPARISON: 2/21/2023. 12/26/2021.    CONTRAST/COMPLICATIONS:  IV Contrast: NONE  Oral Contrast: NONE  Complications: None reported at time of study completion    PROCEDURE:  CT of the Chest, Abdomen and Pelvis was performed.  Sagittal and coronal reformats were performed.    FINDINGS:  CHEST:  LUNGS AND LARGE AIRWAYS: Patent central airways. Mild interlobular septal   thickening and groundglass opacities. Atelectasis in the anterior left   lower lobe and in the lingula.  PLEURA: Small right andtrace left pleural effusions.  VESSELS: Atherosclerotic changes of the aorta and coronary arteries.   Stent, possibly in the right axillary vein.  HEART: Cardiomegaly. No pericardial effusion.  MEDIASTINUM AND PADMINI: No lymphadenopathy.  CHEST WALL AND LOWER NECK: Within normal limits.    ABDOMEN AND PELVIS:  LIVER: Within normal limits.  BILE DUCTS: Mild dilatation of the biliary tree is also unchanged.  GALLBLADDER: Within normal limits.  SPLEEN: Within normal limits.  PANCREAS: A dilated proximal pancreatic duct is again noted and unchanged   since recent MRI.  ADRENALS: Within normal limits.  KIDNEYS/URETERS: Bilateral atrophic kidneys.    BLADDER: Decompressed and could not be evaluated.  REPRODUCTIVE ORGANS: Prostate within normal limits.    BOWEL: No bowel obstruction. Appendix is normal. Retained contrast within   the large bowel which contains moderate amount of stool but is otherwise   is normal in appearance.  PERITONEUM: No ascites.  VESSELS: Atherosclerotic changes.  RETROPERITONEUM/LYMPH NODES: No lymphadenopathy.  ABDOMINAL WALL: Anasarca.  BONES: Degenerative changes.    IMPRESSION:  Mild pulmonary edema and marked anasarca.  A dilated proximal pancreatic duct and mildly dilated biliary tree are   without significant change.      --- End of Report ---    < end of copied text >  < from: Transthoracic Echocardiogram (07.31.23 @ 14:06) >  Derived Variables:  LVMI: 148 g/m2  RWT: 0.50  Ejection Fraction Lugo: 57 %    ------------------------------------------------------------------------  OBSERVATIONS:  Mitral Valve: Normal mitral valve. Trace mitral  regurgitation.  Aortic Root: Aortic Root: 3.8 cm.  Ascending Aorta: 2.8 cm.  Aortic Valve: Trileaflet aortic valve. No aortic stenosis.  No aortic valve regurgitation seen.  Left Atrium: Normal left atrial size on limited views.  Left Ventricle: Normal Left Ventricular Systolic Function,  (EF = 57% by biplane) No regional wall motion  abnormalities. Moderately increased left ventricular wall  thickness.  Differential includes hypertensive  cardiomyopathy, infiltrative cardiomyopathy, and  hypertrophic cardiomyopathy, among others. Consider cardiac  MRI if clinically indicated.  Unable to adequately assess  diastolic function due to technical aspects of this study.  Right Heart: Dilated right atrium. Normal right ventricular  size and function. Normal tricuspid valve. Mild tricuspid  regurgitation. Normal pulmonic valve. Trace pulmonic  insufficiency is noted.  Pericardium/PleuraTrivial pericardial effusion is seen.  Hemodynamic: RA Pressure is 8 mm Hg. RV systolic pressure  is severely increased at  66 mm Hg.  ------------------------------------------------------------------------  CONCLUSIONS:  1. Normal mitral valve. Trace mitral regurgitation.  2. Trileaflet aortic valve. No aortic stenosis. No aortic  valve regurgitation seen.  3. Moderately increased left ventricular wall thickness.  Differential includes hypertensive cardiomyopathy,  infiltrative cardiomyopathy, and hypertrophic  cardiomyopathy, among others. Consider cardiac MRI if  clinically indicated.  4. Normal Left Ventricular Systolic Function,  (EF = 57% by  biplane) No regional wall motion abnormalities.  5. Unable to adequately assess diastolic function due to  technical aspects of this study.  6. Dilated right atrium.  7. Normal right ventricular size and function.  8. RA Pressure is 8 mm Hg.  9. RV systolic pressure is severely increased at  66 mm Hg.  10. Trivial pericardial effusion is seen.    *** Compared with echocardiogram report of 2/24/2023, LVEF  has improved on current study.  ------------------------------------------------------------------------  Confirmed on  8/1/2023 - 12:32:15 by Louise Morillo MD  ------------------------------------------------------------------------    < end of copied text >    Imaging  Reviewed:  YES    Consultant(s) Notes Reviewed:   YES      Plan of care was discussed with patient and /or primary care giver; all questions and concerns were addressed  NP Note discussed with  Primary Attending    Patient is a 62y old  Male who presents with a chief complaint of Missed dialysis (21 Aug 2023 11:25)      INTERVAL HPI/OVERNIGHT EVENTS: no new complaints    MEDICATIONS  (STANDING):  albuterol    90 MICROgram(s) HFA Inhaler 2 Puff(s) Inhalation every 6 hours  amLODIPine   Tablet 10 milliGRAM(s) Oral daily  aspirin enteric coated 81 milliGRAM(s) Oral daily  atorvastatin 40 milliGRAM(s) Oral at bedtime  budesonide 160 MICROgram(s)/formoterol 4.5 MICROgram(s) Inhaler 2 Puff(s) Inhalation two times a day  chlorhexidine 2% Cloths 1 Application(s) Topical daily  dextrose 5%. 1000 milliLiter(s) (50 mL/Hr) IV Continuous <Continuous>  dextrose 5%. 1000 milliLiter(s) (100 mL/Hr) IV Continuous <Continuous>  dextrose 50% Injectable 25 Gram(s) IV Push once  dextrose 50% Injectable 25 Gram(s) IV Push once  dextrose 50% Injectable 12.5 Gram(s) IV Push once  epoetin beverley (PROCRIT) Injectable 71941 Unit(s) IV Push <User Schedule>  epoetin beverley-epbx (RETACRIT) Injectable 68239 Unit(s) IV Push <User Schedule>  folic acid 1 milliGRAM(s) Oral daily  furosemide    Tablet 40 milliGRAM(s) Oral daily  glucagon  Injectable 1 milliGRAM(s) IntraMuscular once  hydrALAZINE 25 milliGRAM(s) Oral three times a day  lactobacillus acidophilus 1 Tablet(s) Oral two times a day with meals  latanoprost 0.005% Ophthalmic Solution 1 Drop(s) Both EYES at bedtime  levothyroxine 25 MICROGram(s) Oral daily  lidocaine   4% Patch 2 Patch Transdermal daily  LORazepam     Tablet 2 milliGRAM(s) Oral <User Schedule>  melatonin 3 milliGRAM(s) Oral at bedtime  metoprolol succinate ER 50 milliGRAM(s) Oral daily  pantoprazole    Tablet 40 milliGRAM(s) Oral before breakfast  sertraline 200 milliGRAM(s) Oral daily  sevelamer carbonate 800 milliGRAM(s) Oral three times a day with meals  sucralfate 1 Gram(s) Oral four times a day    MEDICATIONS  (PRN):  acetaminophen     Tablet .. 1000 milliGRAM(s) Oral every 8 hours PRN Moderate Pain (4 - 6)  dextrose Oral Gel 15 Gram(s) Oral once PRN Blood Glucose LESS THAN 70 milliGRAM(s)/deciliter  ondansetron   Disintegrating Tablet 4 milliGRAM(s) Oral two times a day PRN Nausea and/or Vomiting  ondansetron Injectable 4 milliGRAM(s) IV Push every 6 hours PRN Nausea and/or Vomiting      __________________________________________________  REVIEW OF SYSTEMS:    CONSTITUTIONAL: No fever,   EYES: no acute visual disturbances  NECK: No pain or stiffness  RESPIRATORY: No cough; No shortness of breath  CARDIOVASCULAR: No chest pain, no palpitations  GASTROINTESTINAL: No pain. No nausea or vomiting; No diarrhea   NEUROLOGICAL: No headache or numbness, no tremors  MUSCULOSKELETAL: No joint pain, no muscle pain  GENITOURINARY: no dysuria, no frequency, no hesitancy  PSYCHIATRY: no depression , no anxiety  ALL OTHER  ROS negative        Vital Signs Last 24 Hrs  T(C): 36.6 (21 Aug 2023 12:05), Max: 36.7 (20 Aug 2023 22:21)  T(F): 97.9 (21 Aug 2023 12:05), Max: 98.1 (20 Aug 2023 22:21)  HR: 85 (21 Aug 2023 12:05) (58 - 92)  BP: 118/60 (21 Aug 2023 12:05) (91/35 - 120/52)  BP(mean): 72 (21 Aug 2023 01:28) (71 - 72)  RR: 17 (21 Aug 2023 12:05) (16 - 19)  SpO2: 100% (21 Aug 2023 12:05) (96% - 100%)    Parameters below as of 21 Aug 2023 12:05  Patient On (Oxygen Delivery Method): room air        ________________________________________________  PHYSICAL EXAM:  GENERAL: NAD  HEENT: Normocephalic;  conjunctivae and sclerae clear; moist mucous membranes;   NECK : supple  CHEST/LUNG: Clear to auscultation bilaterally with good air entry   HEART: S1 S2  regular; no murmurs, gallops or rubs  ABDOMEN: Soft, Nontender, Nondistended; Bowel sounds present  EXTREMITIES: right UE AVF, no cyanosis; no edema; no calf tenderness  SKIN: warm and dry; no rash  NERVOUS SYSTEM:  Awake and alert; Oriented  to person, person and place, confused    _________________________________________________  LABS:                        9.9    2.28  )-----------( 96       ( 21 Aug 2023 12:00 )             32.2     08-21    123<L>  |  97  |  58<H>  ----------------------------<  200<H>  5.6<H>   |  12<L>  |  12.00<H>    Ca    7.2<L>      21 Aug 2023 12:00  Phos  6.6     08-21  Mg     2.8     08-21    TPro  6.7  /  Alb  2.9<L>  /  TBili  0.4  /  DBili  x   /  AST  50<H>  /  ALT  73<H>  /  AlkPhos  134<H>  08-21      Urinalysis Basic - ( 21 Aug 2023 12:00 )    Color: x / Appearance: x / SG: x / pH: x  Gluc: 200 mg/dL / Ketone: x  / Bili: x / Urobili: x   Blood: x / Protein: x / Nitrite: x   Leuk Esterase: x / RBC: x / WBC x   Sq Epi: x / Non Sq Epi: x / Bacteria: x      CAPILLARY BLOOD GLUCOSE      POCT Blood Glucose.: 158 mg/dL (21 Aug 2023 07:39)  POCT Blood Glucose.: 179 mg/dL (20 Aug 2023 21:28)  POCT Blood Glucose.: 236 mg/dL (20 Aug 2023 16:36)        RADIOLOGY & ADDITIONAL TESTS:  < from: Xray Chest 1 View- PORTABLE-Urgent (Xray Chest 1 View- PORTABLE-Urgent .) (08.20.23 @ 11:18) >  ACC: [id number] EXAM:  XR CHEST PORTABLE URGENT 1V   ORDERED BY: FLORY   JD     PROCEDURE DATE:  08/20/2023          INTERPRETATION:  Clinical history: Shortness of breath    COMPARISON: 7/30/2023    A single frontal view the chest and straightno evidence of effusion,   consolidation or pneumothorax. Cardiomegaly is again seen. Surgical clips   are seen by the right elbow and vascular stent is seen in the projection   of the right shoulder.    IMPRESSION:    No acute pulmonary disease.    --- End of Report ---    < end of copied text >  < from: CT Abdomen and Pelvis No Cont (07.30.23 @ 13:35) >  ACC: [id number] EXAM:  CT ABDOMEN AND PELVIS   ORDERED BY: ALEXANDER MATIAS     PROCEDURE DATE:  07/30/2023          INTERPRETATION:  CLINICAL INFORMATION: Worsening nausea and vomiting with   dark colored emesis.    COMPARISON: 7/26/2023    CONTRAST/COMPLICATIONS:  IV Contrast: NONE  Oral Contrast: NONE  Complications: None reported at time of study completion    PROCEDURE:  CT of the Abdomen and Pelvis was performed.  Sagittal and coronal reformats were performed.    FINDINGS: The examination is limited by lack of IV contrast.  LOWER CHEST: Small bilateral pleural effusions. Small bilateral   atelectasis. Nonspecific groundglass densities in the right lower lung   zone; clinical correlation with pneumonia is suggested. Cardiomegaly and   mild pericardial effusion, unchanged.    LIVER: Within normal limits.  BILE DUCTS: Dilated common bile duct again noted.  GALLBLADDER: Small gallstones. Nonspecific trace pericholecystic fluid.  SPLEEN: Within normal limits.  PANCREAS: Dilated main pancreaticduct is again noted.  ADRENALS: The right adrenal appears unremarkable. Nonspecific left   adrenal thickening.  KIDNEYS/URETERS: Within normal limits except for a few small hypodense   lesions in the kidneys bilaterally, representing probable cysts.    BLADDER: Underdistended.  REPRODUCTIVE ORGANS: The prostate and seminal vesicles appear grossly   unremarkable.    BOWEL: No bowel obstruction. Appendix unremarkable. Nonspecific mild   distal esophageal mural thickening.  PERITONEUM: Small ascites. No free air.  VESSELS: Calcified atherosclerotic disease.  RETROPERITONEUM/LYMPH NODES: No lymphadenopathy.  ABDOMINAL WALL: Anasarca again noted.  BONES: No acute CT findings.    IMPRESSION: Small gallstones. Nonspecific trace pericholecystic fluid..   If there is a clinical suspicion for acute cholecystitis, gallbladder   ultrasound/HIDA scan may be pursued for further evaluation.    Stable dilated common bile duct and main pancreatic duct. Please see   previous MRCP dated 2/23/2023.    No bowel obstruction. Appendix unremarkable. Nonspecific mild distal   esophageal mural thickening.    Small ascites.    Anasarca.    Small bilateral pleural effusions. Small bilateral atelectasis.   Nonspecific groundglass densities in the right lower lungzone; clinical   correlation with pneumonia is suggested.    Cardiomegaly and mild pericardial effusion, unchanged.    --- End of Report ---    < end of copied text >  < from: CT Chest No Cont (07.26.23 @ 18:09) >  ACC: [id number] EXAM:  CT ABDOMEN AND PELVIS   ORDERED BY: JAD MATOS     ACC: [id number] EXAM:  CT CHEST   ORDERED BY: JAD MATOS     PROCEDURE DATE:  07/26/2023          INTERPRETATION:  CLINICAL INFORMATION: ESRD on hemodialysis. Fluid   overload.    COMPARISON: 2/21/2023. 12/26/2021.    CONTRAST/COMPLICATIONS:  IV Contrast: NONE  Oral Contrast: NONE  Complications: None reported at time of study completion    PROCEDURE:  CT of the Chest, Abdomen and Pelvis was performed.  Sagittal and coronal reformats were performed.    FINDINGS:  CHEST:  LUNGS AND LARGE AIRWAYS: Patent central airways. Mild interlobular septal   thickening and groundglass opacities. Atelectasis in the anterior left   lower lobe and in the lingula.  PLEURA: Small right andtrace left pleural effusions.  VESSELS: Atherosclerotic changes of the aorta and coronary arteries.   Stent, possibly in the right axillary vein.  HEART: Cardiomegaly. No pericardial effusion.  MEDIASTINUM AND PADMINI: No lymphadenopathy.  CHEST WALL AND LOWER NECK: Within normal limits.    ABDOMEN AND PELVIS:  LIVER: Within normal limits.  BILE DUCTS: Mild dilatation of the biliary tree is also unchanged.  GALLBLADDER: Within normal limits.  SPLEEN: Within normal limits.  PANCREAS: A dilated proximal pancreatic duct is again noted and unchanged   since recent MRI.  ADRENALS: Within normal limits.  KIDNEYS/URETERS: Bilateral atrophic kidneys.    BLADDER: Decompressed and could not be evaluated.  REPRODUCTIVE ORGANS: Prostate within normal limits.    BOWEL: No bowel obstruction. Appendix is normal. Retained contrast within   the large bowel which contains moderate amount of stool but is otherwise   is normal in appearance.  PERITONEUM: No ascites.  VESSELS: Atherosclerotic changes.  RETROPERITONEUM/LYMPH NODES: No lymphadenopathy.  ABDOMINAL WALL: Anasarca.  BONES: Degenerative changes.    IMPRESSION:  Mild pulmonary edema and marked anasarca.  A dilated proximal pancreatic duct and mildly dilated biliary tree are   without significant change.      --- End of Report ---    < end of copied text >  < from: Transthoracic Echocardiogram (07.31.23 @ 14:06) >  Derived Variables:  LVMI: 148 g/m2  RWT: 0.50  Ejection Fraction Lugo: 57 %    ------------------------------------------------------------------------  OBSERVATIONS:  Mitral Valve: Normal mitral valve. Trace mitral  regurgitation.  Aortic Root: Aortic Root: 3.8 cm.  Ascending Aorta: 2.8 cm.  Aortic Valve: Trileaflet aortic valve. No aortic stenosis.  No aortic valve regurgitation seen.  Left Atrium: Normal left atrial size on limited views.  Left Ventricle: Normal Left Ventricular Systolic Function,  (EF = 57% by biplane) No regional wall motion  abnormalities. Moderately increased left ventricular wall  thickness.  Differential includes hypertensive  cardiomyopathy, infiltrative cardiomyopathy, and  hypertrophic cardiomyopathy, among others. Consider cardiac  MRI if clinically indicated.  Unable to adequately assess  diastolic function due to technical aspects of this study.  Right Heart: Dilated right atrium. Normal right ventricular  size and function. Normal tricuspid valve. Mild tricuspid  regurgitation. Normal pulmonic valve. Trace pulmonic  insufficiency is noted.  Pericardium/PleuraTrivial pericardial effusion is seen.  Hemodynamic: RA Pressure is 8 mm Hg. RV systolic pressure  is severely increased at  66 mm Hg.  ------------------------------------------------------------------------  CONCLUSIONS:  1. Normal mitral valve. Trace mitral regurgitation.  2. Trileaflet aortic valve. No aortic stenosis. No aortic  valve regurgitation seen.  3. Moderately increased left ventricular wall thickness.  Differential includes hypertensive cardiomyopathy,  infiltrative cardiomyopathy, and hypertrophic  cardiomyopathy, among others. Consider cardiac MRI if  clinically indicated.  4. Normal Left Ventricular Systolic Function,  (EF = 57% by  biplane) No regional wall motion abnormalities.  5. Unable to adequately assess diastolic function due to  technical aspects of this study.  6. Dilated right atrium.  7. Normal right ventricular size and function.  8. RA Pressure is 8 mm Hg.  9. RV systolic pressure is severely increased at  66 mm Hg.  10. Trivial pericardial effusion is seen.    *** Compared with echocardiogram report of 2/24/2023, LVEF  has improved on current study.  ------------------------------------------------------------------------  Confirmed on  8/1/2023 - 12:32:15 by Louise Morillo MD  ------------------------------------------------------------------------    < end of copied text >    Imaging  Reviewed:  YES    Consultant(s) Notes Reviewed:   YES      Plan of care was discussed with patient and /or primary care giver; all questions and concerns were addressed

## 2023-08-21 NOTE — PROGRESS NOTE ADULT - SUBJECTIVE AND OBJECTIVE BOX
[facility] : Progress Note :: [phone], (office [phone]),   Dr Mosher / Dr Washington / Dr Young / Dr Doll / Dr Varsha TURCIOS / Dr Tello / Dr Garcia / Dr Larry qiu  _____________________________________________________________________________________________    s/p HD today. had 2 hrs of HD today.    No Known Drug Allergies  fish (Rash)  liver (Anaphylaxis)    Hospital Medications:   MEDICATIONS  (STANDING):  albuterol    90 MICROgram(s) HFA Inhaler 2 Puff(s) Inhalation every 6 hours  amLODIPine   Tablet 10 milliGRAM(s) Oral daily  aspirin enteric coated 81 milliGRAM(s) Oral daily  atorvastatin 40 milliGRAM(s) Oral at bedtime  budesonide 160 MICROgram(s)/formoterol 4.5 MICROgram(s) Inhaler 2 Puff(s) Inhalation two times a day  chlorhexidine 2% Cloths 1 Application(s) Topical daily  dextrose 5%. 1000 milliLiter(s) (50 mL/Hr) IV Continuous <Continuous>  dextrose 5%. 1000 milliLiter(s) (100 mL/Hr) IV Continuous <Continuous>  dextrose 50% Injectable 25 Gram(s) IV Push once  dextrose 50% Injectable 25 Gram(s) IV Push once  dextrose 50% Injectable 12.5 Gram(s) IV Push once  epoetin beverley (PROCRIT) Injectable 10262 Unit(s) IV Push <User Schedule>  epoetin beverley-epbx (RETACRIT) Injectable 92911 Unit(s) IV Push <User Schedule>  folic acid 1 milliGRAM(s) Oral daily  furosemide    Tablet 40 milliGRAM(s) Oral daily  glucagon  Injectable 1 milliGRAM(s) IntraMuscular once  hydrALAZINE 25 milliGRAM(s) Oral three times a day  lactobacillus acidophilus 1 Tablet(s) Oral two times a day with meals  latanoprost 0.005% Ophthalmic Solution 1 Drop(s) Both EYES at bedtime  levothyroxine 25 MICROGram(s) Oral daily  lidocaine   4% Patch 2 Patch Transdermal daily  LORazepam     Tablet 2 milliGRAM(s) Oral <User Schedule>  melatonin 3 milliGRAM(s) Oral at bedtime  metoprolol succinate ER 50 milliGRAM(s) Oral daily  pantoprazole    Tablet 40 milliGRAM(s) Oral before breakfast  sertraline 200 milliGRAM(s) Oral daily  sevelamer carbonate 800 milliGRAM(s) Oral three times a day with meals  sucralfate 1 Gram(s) Oral four times a day      VITALS:  T(F): 100.8 (08-21-23 @ 20:35), Max: 100.8 (08-21-23 @ 20:35)  HR: 95 (08-21-23 @ 20:35)  BP: 93/48 (08-21-23 @ 20:35)  RR: 18 (08-21-23 @ 20:35)  SpO2: 98% (08-21-23 @ 20:35)  Wt(kg): --    08-21 @ 07:01  -  08-21 @ 21:31  --------------------------------------------------------  IN: 400 mL / OUT: 2000 mL / NET: -1600 mL        PHYSICAL EXAM:  Constitutional: NAD  HEENT: anicteric sclera, oropharynx clear.  Neck: No JVD  Respiratory: CTAB, no wheezes, rales or rhonchi  Cardiovascular: S1, S2, RRR  Gastrointestinal: BS+, soft, NT/ND  Extremities: No peripheral edema  Neurological: A/O x 3, no focal deficits.  Vascular Access: AVF with thrill and bruit     LABS:  08-21    123<L>  |  97  |  58<H>  ----------------------------<  200<H>  5.6<H>   |  12<L>  |  12.00<H>    Ca    7.2<L>      21 Aug 2023 12:00  Phos  6.6     08-21  Mg     2.8     08-21    TPro  6.7  /  Alb  2.9<L>  /  TBili  0.4  /  DBili      /  AST  50<H>  /  ALT  73<H>  /  AlkPhos  134<H>  08-21    Creatinine Trend: 12.00 <--, 12.30 <--, 10.70 <--                        9.9    2.28  )-----------( 96       ( 21 Aug 2023 12:00 )             32.2     Urine Studies:  Urinalysis Basic - ( 21 Aug 2023 12:00 )    Color:  / Appearance:  / SG:  / pH:   Gluc: 200 mg/dL / Ketone:   / Bili:  / Urobili:    Blood:  / Protein:  / Nitrite:    Leuk Esterase:  / RBC:  / WBC    Sq Epi:  / Non Sq Epi:  / Bacteria:         RADIOLOGY & ADDITIONAL STUDIES:

## 2023-08-21 NOTE — PROGRESS NOTE ADULT - PROBLEM SELECTOR PLAN 11
- Most likely d/t ESRD  - Continue to monitor BMP in AM-f/u results    - Nephro-Dr. Mosher, following - Most likely d/t ESRD  - Nephro-Dr. Mosher, following

## 2023-08-22 NOTE — PROGRESS NOTE ADULT - ASSESSMENT
Fevers- low grade  Diarrhea- possible C. Difficile.      Plan - Cont Vancomycin 125mgs po q6hrs, will give for a total of 10 days.

## 2023-08-22 NOTE — PROGRESS NOTE ADULT - ASSESSMENT
Patient is a 62 year old male from [facility], walks with RW, with PMH of ESRD on HD (TTS), BKA- L w/prosthetic leg, DM, Left eye blindness, CHF, CAD, HTN, HLD, osteoporosis, bronchitis, current smoker, and anemia. Patient presented to ED with difficulty breathing, SOB, missed dialysis. Patient admitted to medicine for AHRF secondary to fluid overload from missed HD session. Hospital course complicated by hypoglycemia. Cardiology consulted recommending ECHO noted, normal LV fx severe pulm HTN. Keep patient net negative. Pulmonology recommending budesonide and albuterol PRN. Patient hospital course complicated by fever and hypotension, with loose stool. Patient refuse blood culture and IV placement. Patient allowed attempt eventually however difficult venous access and patient refuse further attempts. ID recommending starting vanco PO. Patient for LTC placement with HD facility.

## 2023-08-22 NOTE — PROGRESS NOTE ADULT - SUBJECTIVE AND OBJECTIVE BOX
NP Note discussed with  Primary Attending    Patient is a 62y old  Male who presents with a chief complaint of Missed dialysis (22 Aug 2023 10:03)      INTERVAL HPI/OVERNIGHT EVENTS: no new complaints    MEDICATIONS  (STANDING):  albuterol    90 MICROgram(s) HFA Inhaler 2 Puff(s) Inhalation every 6 hours  amLODIPine   Tablet 10 milliGRAM(s) Oral daily  aspirin enteric coated 81 milliGRAM(s) Oral daily  atorvastatin 40 milliGRAM(s) Oral at bedtime  budesonide 160 MICROgram(s)/formoterol 4.5 MICROgram(s) Inhaler 2 Puff(s) Inhalation two times a day  chlorhexidine 2% Cloths 1 Application(s) Topical daily  dextrose 5%. 1000 milliLiter(s) (50 mL/Hr) IV Continuous <Continuous>  dextrose 5%. 1000 milliLiter(s) (100 mL/Hr) IV Continuous <Continuous>  dextrose 50% Injectable 25 Gram(s) IV Push once  dextrose 50% Injectable 12.5 Gram(s) IV Push once  dextrose 50% Injectable 25 Gram(s) IV Push once  epoetin beverley (PROCRIT) Injectable 82031 Unit(s) IV Push <User Schedule>  epoetin beverley-epbx (RETACRIT) Injectable 06108 Unit(s) IV Push <User Schedule>  folic acid 1 milliGRAM(s) Oral daily  furosemide    Tablet 40 milliGRAM(s) Oral daily  glucagon  Injectable 1 milliGRAM(s) IntraMuscular once  hydrALAZINE 25 milliGRAM(s) Oral three times a day  lactobacillus acidophilus 1 Tablet(s) Oral two times a day with meals  latanoprost 0.005% Ophthalmic Solution 1 Drop(s) Both EYES at bedtime  levothyroxine 25 MICROGram(s) Oral daily  lidocaine   4% Patch 2 Patch Transdermal daily  LORazepam     Tablet 2 milliGRAM(s) Oral <User Schedule>  melatonin 3 milliGRAM(s) Oral at bedtime  metoprolol succinate ER 50 milliGRAM(s) Oral daily  pantoprazole    Tablet 40 milliGRAM(s) Oral before breakfast  sertraline 200 milliGRAM(s) Oral daily  sevelamer carbonate 800 milliGRAM(s) Oral three times a day with meals  sucralfate 1 Gram(s) Oral four times a day  vancomycin    Solution 125 milliGRAM(s) Oral every 6 hours    MEDICATIONS  (PRN):  acetaminophen     Tablet .. 1000 milliGRAM(s) Oral every 8 hours PRN Moderate Pain (4 - 6)  dextrose Oral Gel 15 Gram(s) Oral once PRN Blood Glucose LESS THAN 70 milliGRAM(s)/deciliter  ondansetron   Disintegrating Tablet 4 milliGRAM(s) Oral two times a day PRN Nausea and/or Vomiting  ondansetron Injectable 4 milliGRAM(s) IV Push every 6 hours PRN Nausea and/or Vomiting      __________________________________________________  REVIEW OF SYSTEMS:    CONSTITUTIONAL: No fever,   EYES: no acute visual disturbances  NECK: No pain or stiffness  RESPIRATORY: No cough; No shortness of breath  CARDIOVASCULAR: No chest pain, no palpitations  GASTROINTESTINAL: No pain. No nausea or vomiting; No diarrhea   NEUROLOGICAL: No headache or numbness, no tremors  MUSCULOSKELETAL: No joint pain, no muscle pain  GENITOURINARY: no dysuria, no frequency, no hesitancy  PSYCHIATRY: no depression , no anxiety  ALL OTHER  ROS negative        Vital Signs Last 24 Hrs  T(C): 37.2 (22 Aug 2023 13:00), Max: 38.2 (21 Aug 2023 20:35)  T(F): 99 (22 Aug 2023 13:00), Max: 100.8 (21 Aug 2023 20:35)  HR: 91 (22 Aug 2023 13:00) (91 - 96)  BP: 132/64 (22 Aug 2023 13:00) (93/48 - 135/58)  BP(mean): 59 (21 Aug 2023 20:35) (59 - 59)  RR: 18 (22 Aug 2023 13:00) (18 - 18)  SpO2: 100% (22 Aug 2023 13:00) (98% - 100%)    Parameters below as of 22 Aug 2023 13:00  Patient On (Oxygen Delivery Method): room air        ________________________________________________  PHYSICAL EXAM:  GENERAL: NAD  HEENT: Normocephalic;  conjunctivae and sclerae clear; moist mucous membranes;   NECK : supple  CHEST/LUNG: Clear to auscultation bilaterally with good air entry   HEART: S1 S2  regular; no murmurs, gallops or rubs  ABDOMEN: Soft, Nontender, Nondistended; Bowel sounds present  EXTREMITIES: no cyanosis; no edema; no calf tenderness  SKIN: warm and dry; no rash  NERVOUS SYSTEM:  Awake and alert; Oriented  to place, person and time ; no new deficits    _________________________________________________  LABS:                        9.9    2.28  )-----------( 96       ( 21 Aug 2023 12:00 )             32.2     08-21    123<L>  |  97  |  58<H>  ----------------------------<  200<H>  5.6<H>   |  12<L>  |  12.00<H>    Ca    7.2<L>      21 Aug 2023 12:00  Phos  6.6     08-21  Mg     2.8     08-21    TPro  6.7  /  Alb  2.9<L>  /  TBili  0.4  /  DBili  x   /  AST  50<H>  /  ALT  73<H>  /  AlkPhos  134<H>  08-21      Urinalysis Basic - ( 21 Aug 2023 12:00 )    Color: x / Appearance: x / SG: x / pH: x  Gluc: 200 mg/dL / Ketone: x  / Bili: x / Urobili: x   Blood: x / Protein: x / Nitrite: x   Leuk Esterase: x / RBC: x / WBC x   Sq Epi: x / Non Sq Epi: x / Bacteria: x      CAPILLARY BLOOD GLUCOSE      POCT Blood Glucose.: 278 mg/dL (22 Aug 2023 12:03)  POCT Blood Glucose.: 179 mg/dL (22 Aug 2023 07:50)        RADIOLOGY & ADDITIONAL TESTS:    Imaging  Reviewed:  YES/NO    Consultant(s) Notes Reviewed:   YES/ No      Plan of care was discussed with patient and /or primary care giver; all questions and concerns were addressed

## 2023-08-22 NOTE — PROGRESS NOTE ADULT - PROBLEM SELECTOR PLAN 12
- Continue to hold heparin due to ongoing anemia and concern for possible gastric ulcer  - C/w SCDs  - C/w Protonix for GI ppx    Discharge Planning  pending acceptance to LTC  will need to be afebrile for 48-72 hours, unable to get blood culture, basic labs, IV

## 2023-08-22 NOTE — PROGRESS NOTE ADULT - ASSESSMENT
# ESRD. on HD TIW.  As only tolerating less than prescribed time of dialysis, patient can have more frequent dialysis ( more than 3 times a week)  severe metabolic acidosis from ESRD.  UF with HD   D/W pt compliance with HD   pre-HD Ativan   # anemia of CKD. epogen on HD. Transfuse for HBG <7  #CKDMBD. cont sevelamer  # HTN. blood pressure at goal     D/C planning to nursing home with dialysis capabilities

## 2023-08-22 NOTE — PROGRESS NOTE ADULT - SUBJECTIVE AND OBJECTIVE BOX
[facility] : Progress Note :: [phone], (office [phone]),   Dr Mosher / Dr Washington / Dr Young / Dr Doll / Dr Varsha TURCIOS / Dr Tello / Dr Garcia / Dr Larry qiu  _____________________________________________________________________________________________    noted febrile overnight.  work-up ongoing     No Known Drug Allergies  fish (Rash)  liver (Anaphylaxis)    Hospital Medications:   MEDICATIONS  (STANDING):  albuterol    90 MICROgram(s) HFA Inhaler 2 Puff(s) Inhalation every 6 hours  amLODIPine   Tablet 10 milliGRAM(s) Oral daily  aspirin enteric coated 81 milliGRAM(s) Oral daily  atorvastatin 40 milliGRAM(s) Oral at bedtime  budesonide 160 MICROgram(s)/formoterol 4.5 MICROgram(s) Inhaler 2 Puff(s) Inhalation two times a day  chlorhexidine 2% Cloths 1 Application(s) Topical daily  dextrose 5%. 1000 milliLiter(s) (50 mL/Hr) IV Continuous <Continuous>  dextrose 5%. 1000 milliLiter(s) (100 mL/Hr) IV Continuous <Continuous>  dextrose 50% Injectable 25 Gram(s) IV Push once  dextrose 50% Injectable 25 Gram(s) IV Push once  dextrose 50% Injectable 12.5 Gram(s) IV Push once  epoetin beverley (PROCRIT) Injectable 51029 Unit(s) IV Push <User Schedule>  epoetin beverley-epbx (RETACRIT) Injectable 41020 Unit(s) IV Push <User Schedule>  folic acid 1 milliGRAM(s) Oral daily  furosemide    Tablet 40 milliGRAM(s) Oral daily  glucagon  Injectable 1 milliGRAM(s) IntraMuscular once  hydrALAZINE 25 milliGRAM(s) Oral three times a day  lactobacillus acidophilus 1 Tablet(s) Oral two times a day with meals  latanoprost 0.005% Ophthalmic Solution 1 Drop(s) Both EYES at bedtime  levothyroxine 25 MICROGram(s) Oral daily  lidocaine   4% Patch 2 Patch Transdermal daily  LORazepam     Tablet 2 milliGRAM(s) Oral <User Schedule>  melatonin 3 milliGRAM(s) Oral at bedtime  metoprolol succinate ER 50 milliGRAM(s) Oral daily  pantoprazole    Tablet 40 milliGRAM(s) Oral before breakfast  sertraline 200 milliGRAM(s) Oral daily  sevelamer carbonate 800 milliGRAM(s) Oral three times a day with meals  sucralfate 1 Gram(s) Oral four times a day  vancomycin    Solution 125 milliGRAM(s) Oral every 6 hours      VITALS:  T(F): 99 (08-22-23 @ 13:00), Max: 100.8 (08-21-23 @ 20:35)  HR: 91 (08-22-23 @ 13:00)  BP: 132/64 (08-22-23 @ 13:00)  RR: 18 (08-22-23 @ 13:00)  SpO2: 100% (08-22-23 @ 13:00)  Wt(kg): --    08-21 @ 07:01  -  08-22 @ 07:00  --------------------------------------------------------  IN: 400 mL / OUT: 2000 mL / NET: -1600 mL    08-22 @ 07:01  -  08-22 @ 18:45  --------------------------------------------------------  IN: 5 mL / OUT: 0 mL / NET: 5 mL        PHYSICAL EXAM:  Constitutional: NAD  HEENT: anicteric sclera, oropharynx clear.  Neck: No JVD  Respiratory: CTAB, no wheezes, rales or rhonchi  Cardiovascular: S1, S2, RRR  Gastrointestinal: BS+, soft, NT/ND  Extremities:  No peripheral edema  Neurological: A/O x 3, no focal deficits  Vascular Access: RUEAVF with aneurysms, no echymosis induration or tenderness    LABS:  08-21    123<L>  |  97  |  58<H>  ----------------------------<  200<H>  5.6<H>   |  12<L>  |  12.00<H>    Ca    7.2<L>      21 Aug 2023 12:00  Phos  6.6     08-21  Mg     2.8     08-21    TPro  6.7  /  Alb  2.9<L>  /  TBili  0.4  /  DBili      /  AST  50<H>  /  ALT  73<H>  /  AlkPhos  134<H>  08-21    Creatinine Trend: 12.00 <--, 12.30 <--                        9.9    2.28  )-----------( 96       ( 21 Aug 2023 12:00 )             32.2     Urine Studies:  Urinalysis Basic - ( 21 Aug 2023 12:00 )    Color:  / Appearance:  / SG:  / pH:   Gluc: 200 mg/dL / Ketone:   / Bili:  / Urobili:    Blood:  / Protein:  / Nitrite:    Leuk Esterase:  / RBC:  / WBC    Sq Epi:  / Non Sq Epi:  / Bacteria:         RADIOLOGY & ADDITIONAL STUDIES:

## 2023-08-22 NOTE — PROGRESS NOTE ADULT - SUBJECTIVE AND OBJECTIVE BOX
62y Male    Meds:  vancomycin    Solution 125 milliGRAM(s) Oral every 6 hours    Allergies    No Known Drug Allergies  fish (Rash)  liver (Anaphylaxis)    Intolerances        VITALS:  Vital Signs Last 24 Hrs  T(C): 37.2 (22 Aug 2023 13:00), Max: 38.2 (21 Aug 2023 20:35)  T(F): 99 (22 Aug 2023 13:00), Max: 100.8 (21 Aug 2023 20:35)  HR: 91 (22 Aug 2023 13:00) (91 - 95)  BP: 132/64 (22 Aug 2023 13:00) (93/48 - 135/58)  BP(mean): 59 (21 Aug 2023 20:35) (59 - 59)  RR: 18 (22 Aug 2023 13:00) (18 - 18)  SpO2: 100% (22 Aug 2023 13:00) (98% - 100%)    Parameters below as of 22 Aug 2023 13:00  Patient On (Oxygen Delivery Method): room air        LABS/DIAGNOSTIC TESTS:                          9.9    2.28  )-----------( 96       ( 21 Aug 2023 12:00 )             32.2         08-21    123<L>  |  97  |  58<H>  ----------------------------<  200<H>  5.6<H>   |  12<L>  |  12.00<H>    Ca    7.2<L>      21 Aug 2023 12:00  Phos  6.6     08-21  Mg     2.8     08-21    TPro  6.7  /  Alb  2.9<L>  /  TBili  0.4  /  DBili  x   /  AST  50<H>  /  ALT  73<H>  /  AlkPhos  134<H>  08-21      LIVER FUNCTIONS - ( 21 Aug 2023 12:00 )  Alb: 2.9 g/dL / Pro: 6.7 g/dL / ALK PHOS: 134 U/L / ALT: 73 U/L DA / AST: 50 U/L / GGT: x             CULTURES: .Blood Blood-Peripheral  05-28 @ 14:07   No Growth Final  --  --            RADIOLOGY:      ROS:  [  ] UNABLE TO ELICIT 62y Male who I was asked to re eval as he had a low grade fever and developed  some loose stools, he is still having diarrhea but his fevers have improved. He is angry that he is still in the hospital and keeps stating that he wants to go home. He denies any coughing, SOB, nausea or other complaints. I had him started on Vancomycin 125mgs po q 6hrs empirically to treat C. Difficile given his diarrhea and lack of other symptoms.    Meds:  vancomycin    Solution 125 milliGRAM(s) Oral every 6 hours    Allergies    No Known Drug Allergies  fish (Rash)  liver (Anaphylaxis)    Intolerances        VITALS:  Vital Signs Last 24 Hrs  T(C): 37.2 (22 Aug 2023 13:00), Max: 38.2 (21 Aug 2023 20:35)  T(F): 99 (22 Aug 2023 13:00), Max: 100.8 (21 Aug 2023 20:35)  HR: 91 (22 Aug 2023 13:00) (91 - 95)  BP: 132/64 (22 Aug 2023 13:00) (93/48 - 135/58)  BP(mean): 59 (21 Aug 2023 20:35) (59 - 59)  RR: 18 (22 Aug 2023 13:00) (18 - 18)  SpO2: 100% (22 Aug 2023 13:00) (98% - 100%)    Parameters below as of 22 Aug 2023 13:00  Patient On (Oxygen Delivery Method): room air        LABS/DIAGNOSTIC TESTS:                          9.9    2.28  )-----------( 96       ( 21 Aug 2023 12:00 )             32.2         08-21    123<L>  |  97  |  58<H>  ----------------------------<  200<H>  5.6<H>   |  12<L>  |  12.00<H>    Ca    7.2<L>      21 Aug 2023 12:00  Phos  6.6     08-21  Mg     2.8     08-21    TPro  6.7  /  Alb  2.9<L>  /  TBili  0.4  /  DBili  x   /  AST  50<H>  /  ALT  73<H>  /  AlkPhos  134<H>  08-21      LIVER FUNCTIONS - ( 21 Aug 2023 12:00 )  Alb: 2.9 g/dL / Pro: 6.7 g/dL / ALK PHOS: 134 U/L / ALT: 73 U/L DA / AST: 50 U/L / GGT: x             CULTURES: .Blood Blood-Peripheral  05-28 @ 14:07   No Growth Final  --  --            RADIOLOGY:      ROS:  [  ] UNABLE TO ELICIT

## 2023-08-22 NOTE — PROGRESS NOTE ADULT - PROBLEM SELECTOR PLAN 1
- Most likely Acute on chronic combined HF d/t missed HD resulting in volume overload & RLL HAP   - CT A/P noted for RLL PNA.  S/p HAP treatment.  Completed tx for HAP s/p Cefepime & Flagyl.    - SPO2 100% RA  - C/w PRN albuterol & Symbicort   - Nephro-Dr. Mosher, following   - Cardiology-Dr. Still following   - Pulmonology-Dr. Loredo following   - ID-Dr. Newby following

## 2023-08-22 NOTE — PROGRESS NOTE ADULT - SUBJECTIVE AND OBJECTIVE BOX
Patient is a 62y old  Male who presents with a chief complaint of Missed dialysis (21 Aug 2023 21:28)    PATIENT IS SEEN AND EXAMINED IN MEDICAL FLOOR.  SULTANA [    ]    JEREMY [   ]      GT [   ]    ALLERGIES:  No Known Drug Allergies  fish (Rash)  liver (Anaphylaxis)      Daily     Daily Weight in k.1 (21 Aug 2023 14:05)    VITALS:    Vital Signs Last 24 Hrs  T(C): 36.7 (22 Aug 2023 05:38), Max: 38.2 (21 Aug 2023 20:35)  T(F): 98 (22 Aug 2023 05:38), Max: 100.8 (21 Aug 2023 20:35)  HR: 92 (22 Aug 2023 05:38) (85 - 96)  BP: 135/58 (22 Aug 2023 05:38) (93/48 - 135/58)  BP(mean): 59 (21 Aug 2023 20:35) (59 - 59)  RR: 18 (22 Aug 2023 05:38) (17 - 18)  SpO2: 98% (22 Aug 2023 05:38) (98% - 100%)    Parameters below as of 22 Aug 2023 05:38  Patient On (Oxygen Delivery Method): room air        LABS:    CBC Full  -  ( 21 Aug 2023 12:00 )  WBC Count : 2.28 K/uL  RBC Count : 3.60 M/uL  Hemoglobin : 9.9 g/dL  Hematocrit : 32.2 %  Platelet Count - Automated : 96 K/uL  Mean Cell Volume : 89.4 fl  Mean Cell Hemoglobin : 27.5 pg  Mean Cell Hemoglobin Concentration : 30.7 gm/dL  Auto Neutrophil # : x  Auto Lymphocyte # : x  Auto Monocyte # : x  Auto Eosinophil # : x  Auto Basophil # : x  Auto Neutrophil % : x  Auto Lymphocyte % : x  Auto Monocyte % : x  Auto Eosinophil % : x  Auto Basophil % : x      08-    123<L>  |  97  |  58<H>  ----------------------------<  200<H>  5.6<H>   |  12<L>  |  12.00<H>    Ca    7.2<L>      21 Aug 2023 12:00  Phos  6.6     08-  Mg     2.8     08-    TPro  6.7  /  Alb  2.9<L>  /  TBili  0.4  /  DBili  x   /  AST  50<H>  /  ALT  73<H>  /  AlkPhos  134<H>  08-    CAPILLARY BLOOD GLUCOSE      POCT Blood Glucose.: 179 mg/dL (22 Aug 2023 07:50)        LIVER FUNCTIONS - ( 21 Aug 2023 12:00 )  Alb: 2.9 g/dL / Pro: 6.7 g/dL / ALK PHOS: 134 U/L / ALT: 73 U/L DA / AST: 50 U/L / GGT: x           Creatinine Trend: 12.00<--, 12.30<--, 10.70<--, 11.70<--, 12.90<--, 11.70<--  I&O's Summary    21 Aug 2023 07:01  -  22 Aug 2023 07:00  --------------------------------------------------------  IN: 400 mL / OUT: 2000 mL / NET: -1600 mL            .Blood Blood-Peripheral  - @ 14:07   No Growth Final  --  --          MEDICATIONS:    MEDICATIONS  (STANDING):  albuterol    90 MICROgram(s) HFA Inhaler 2 Puff(s) Inhalation every 6 hours  amLODIPine   Tablet 10 milliGRAM(s) Oral daily  aspirin enteric coated 81 milliGRAM(s) Oral daily  atorvastatin 40 milliGRAM(s) Oral at bedtime  budesonide 160 MICROgram(s)/formoterol 4.5 MICROgram(s) Inhaler 2 Puff(s) Inhalation two times a day  chlorhexidine 2% Cloths 1 Application(s) Topical daily  dextrose 5%. 1000 milliLiter(s) (50 mL/Hr) IV Continuous <Continuous>  dextrose 5%. 1000 milliLiter(s) (100 mL/Hr) IV Continuous <Continuous>  dextrose 50% Injectable 25 Gram(s) IV Push once  dextrose 50% Injectable 12.5 Gram(s) IV Push once  dextrose 50% Injectable 25 Gram(s) IV Push once  epoetin beverley (PROCRIT) Injectable 22426 Unit(s) IV Push <User Schedule>  epoetin beverley-epbx (RETACRIT) Injectable 54460 Unit(s) IV Push <User Schedule>  folic acid 1 milliGRAM(s) Oral daily  furosemide    Tablet 40 milliGRAM(s) Oral daily  glucagon  Injectable 1 milliGRAM(s) IntraMuscular once  hydrALAZINE 25 milliGRAM(s) Oral three times a day  lactobacillus acidophilus 1 Tablet(s) Oral two times a day with meals  latanoprost 0.005% Ophthalmic Solution 1 Drop(s) Both EYES at bedtime  levothyroxine 25 MICROGram(s) Oral daily  lidocaine   4% Patch 2 Patch Transdermal daily  LORazepam     Tablet 2 milliGRAM(s) Oral <User Schedule>  melatonin 3 milliGRAM(s) Oral at bedtime  metoprolol succinate ER 50 milliGRAM(s) Oral daily  pantoprazole    Tablet 40 milliGRAM(s) Oral before breakfast  sertraline 200 milliGRAM(s) Oral daily  sevelamer carbonate 800 milliGRAM(s) Oral three times a day with meals  sucralfate 1 Gram(s) Oral four times a day  vancomycin    Solution 125 milliGRAM(s) Oral every 6 hours      MEDICATIONS  (PRN):  acetaminophen     Tablet .. 1000 milliGRAM(s) Oral every 8 hours PRN Moderate Pain (4 - 6)  dextrose Oral Gel 15 Gram(s) Oral once PRN Blood Glucose LESS THAN 70 milliGRAM(s)/deciliter  ondansetron   Disintegrating Tablet 4 milliGRAM(s) Oral two times a day PRN Nausea and/or Vomiting  ondansetron Injectable 4 milliGRAM(s) IV Push every 6 hours PRN Nausea and/or Vomiting      REVIEW OF SYSTEMS:                           ALL ROS DONE [ X   ]    CONSTITUTIONAL:  LETHARGIC [   ], FEVER [   ], UNRESPONSIVE [   ]  CVS:  CP  [   ], SOB, [   ], PALPITATIONS [   ], DIZZYNESS [   ]  RS: COUGH [   ], SPUTUM [   ]  GI: ABDOMINAL PAIN [   ], NAUSEA [   ], VOMITINGS [   ], DIARRHEA [   ], CONSTIPATION [   ]  :  DYSURIA [   ], NOCTURIA [   ], INCREASED FREQUENCY [   ], DRIBLING [   ],  SKELETAL: PAINFUL JOINTS [   ], SWOLLEN JOINTS [   ], NECK ACHE [   ], LOW BACK ACHE [   ],  SKIN : ULCERS [   ], RASH [   ], ITCHING [   ]  CNS: HEAD ACHE [   ], DOUBLE VISION [   ], BLURRED VISION [   ], AMS / CONFUSION [   ], SEIZURES [   ], WEAKNESS [   ],TINGLING / NUMBNESS [   ]    PHYSICAL EXAM:    GENERAL APPEARANCE: NO DISTRESS,    ANASARCA ++ [IMPROVED]  HEENT:  NO PALLOR, NO  JVD,  NO   NODES, NECK SUPPLE  CVS: S1 +, S2 +,   RS: AEEB,  OCCASIONAL  RALES +,   CRACKLES+ AT LUNG BASES [IMPROVED]  ABD: SOFT, NT, NO, BS +  EXT: LEFT BKA  SKIN: WARM,   SKELETAL:  ROM ACCEPTABLE  CNS:  AAO X  2-3        RADIOLOGY :    RADIOLOGY AND READINGS REVIEWED          ASSESSMENT :     Hypervolemia    Hypertension    Adrenal insufficiency    CKD (chronic kidney disease)    Anemia    Glaucoma    Coronary artery disease    HLD (hyperlipidemia)    Peripheral vascular disease    Spinal stenosis of lumbosacral region    Hyperparathyroidism    Diabetes mellitus    Diabetic neuropathy    Contracture of hand    Osteoarthritis    Osteoporosis    Vision loss of left eye    ESRD on hemodialysis    Cataract    BPH (benign prostatic hyperplasia)    UTI (urinary tract infection)    Bladder mass    H/O hematuria    Osteoporosis    Vision loss of right eye    Depression    Chronic GERD    Osteomyelitis of vertebra    CHF (congestive heart failure)    Below knee amputation status, left    History of right cataract extraction    History of left cataract extraction    S/P arteriovenous (AV) fistula creation    H/O hematuria    H/O transurethral destruction of bladder lesion    History of excision of mass        PLAN:  HPI:  Patient is 62 year old male from [facility], walks with RW, with PMH of ESRD on HD (TTS), BKA- L w/prosthetic leg, DM, Left eye blindness, CHF, CAD, HTN, HLD, osteoporosis, bronchitis, current smoker, and anemia who presents with complaint of missed dialysis. Last HD . Pt states he often missed HD sessions.  patient states he missed this HD session due to mild pain in left leg, patient remains able to ambulate. Pt complains of right leg swelling and states he does not make any urine. Patient states he was having mild shortness of breath.  Pt denies any fever, chills, N/V/D, constipation, CP, numbness or tingling (2023 19:20)    # [8/9] MEETING HELD WITH PATIENT, BROTHER ARTEMIO @ [phone] AND SW TEAM. PATIENT W/ HISTORY OF ROUTINE NONCOMPLIANCE W/ LIFE-SUSTAINING TREATMENT [HD], EVALUATIONS AND INTERVENTIONS - COUNSELLED AT LENGTH TO REMAIN COMPLIANT. DISCUSSED THAT PROGNOSIS IS POOR/GRIM GIVEN UNDERLYING MEDICAL CONDITIONS AND GIVEN NONCOMPLIANCE. HE VERBALIZED UNDERSTANDING. REGARDING GOC - PATIENT WISHES FOR FULL CODE. PALLIATIVE CARE CONSULTED. PSYCHIATRY CONSULTED. PATIENT EXPRESSED THAT HE DOES GROW IMPATIENT DURING DIALYSIS SESSIONS AND HAS BEEN LEAVING SESSIONS SOONER THAN PRESCRIBED. IN DISCUSSION THAT RISKS OF DEFERRING COMPLETE HD - INCLUDE BUT ARE NOT LIMITED TO WORSENING CLINICAL CONDITION, ELECTROLYTE ABNORMALITIES, ARRHYTHMIA AND DEATH. HE VERBALIZED UNDERSTANDING. D/W PATIENT THAT REPEATEDLY REFUSING INTERVENTIONS AND ASSESSMENTS IS NOT IN LINE WITH HIS WISHES TO IMPROVE. PATIENT VERBALIZED UNDERSTANDING. DISCUSSED REGARDING CURRENT CLINICAL CONDITION, RECOMMENDED EVALUATIONS AND INTERVENTIONS. ALL QUESTIONS ANSWERED. TEAM HAS OFFERED PATIENT EMOTIONAL SUPPORT AND OFFERED MEDICATION FOR MOOD. OFFERED TO PRE-MEDICATE W/ ANXIOLYTIC PRIOR TO HD. PATIENT IS AGREEABLE.     DISCUSSED THAT PATIENT WILL NEED TO COMPLY WITH HD SESSIONS IN ORDER TO BE MEDICALLY OPTIMIZED FOR DISCHARGE AND FOR SAFE D/C PLANNING. TEAM DISCUSSED OPTIONS OF RETURNING TO [facility] W/ OUTPATIENT HD , IF CARE NEEDS CAN BE SAFELY MET VS. NURSING HOME/[facility] . PATIENT AND BROTHER VERBALIZED UNDERSTANDING.     - SW AND MEDICAL TEAM HAVING ONGOING DISCUSSION WITH PATIENT REGARDING CONSISTENT COMPLIANCE IN ORDER TO RETURN TO HD IN THE OUTPATIENT SETTING  -- CONVEYED THAT PATIENT AND TEAM THAT PATIENT WILL NEED TO CONSISTENTLY COMPLETE FULL HD SESSIONS IN ORDER TO BE CONSIDERED BY OUTPATIENT HD AT NH W/ ONSITE HD OR OUTSIDE HD FACILITY      # ACUTE ON CHRONIC HYPOXIC RESPIRATORY FAILURE S/T PULMONARY EDEMA - S/T INCOMPLETE HD SESSIONS ; ANASARCA  # HYPERKALEMIA  # HX OF COPD + S/P RECENT MULTIFOCAL PNEUMONIA ; R/O ASPIRATION PNEUMONIA  # PULMONARY HYPERTENSION  # UNCONTROLLED HTN  # ESRD ON HD TTS - W/ HX OF NONCOMPLIANCE    - TELEMETRY  - HYDRALAZINE, METOPROLOL AND NORVASC ; PRN IV ANTIHYPERTENSIVE  - LOKELMA  - SUPPLEMENTAL O2  - PLANNED FOR HD  - NEPHROLOGY CONSULT  - PULMONOLOGY CONSULT  - ID CONSULT    - CONTINUES TO REFUSE OR PRE-MATURELY DISCONTINUE SESSIONS OF HD       - [] - OVERNIGHT RAPID RESPONSE S/T HYPOXIA - SELF-LIMITED - PATIENT IMPROVED S/P O2 SUPPLEMENT  - S/P CEFEPIME + FLAGYLL, BCX - NGTD      # RECURRENT INTRACTABLE NAUSEA/VOMITING, ? COFFEE GROUND EMESIS  # GASTROPARESIS  # HISTORY OF ESOPHAGITIS AND DUODENITIS [2021], HX OF GASTROPARESIS W/ EVIDENCE OF THICKENED ESOPHAGUS ON PREVIOUS CT SCAN   # PANCREAS W/ DOUBLE DUCT SIGN    - MONITORING HGB, PLACED ON PPI BID , CARAFATE QID  - PRN ANTIEMETICS  ; S/P DOSE OF REGLAN [WITH MINIMAL IMPROVEMENT]  - MONITORING FOR SYMPTOMS  - WHILE ON DIET PATIENT REPEATEDLY COUNSELLED TO CHEW FOOD CAUTIOUSLY AND CONSUME SMALL BITES W/ REGULAR CLEARING WITH WATER BETWEEN BITES    - ADVANCE DIET AS TOLERATED  - NOTED CT A/P    - S/P RECENT PRBC TRANSFUSION     - GI CONSULT  - SURGERY CONSULT    - [] - EPISODE OF NAUSEA/VOMITING OVER THE WEEKEND - IMPROVED    # LESS LIKELY CHOLECYSTITIS  - S/P ABX  - NOTED CT A/P  - HIDA SCAN - NEGATIVE  - SURGERY CONSULT    # ELEVATED TROPONINS - ? DEMAND ISCHEMIA    - S/P TELEMETRY  - TREND TROPONINS  - ECHO - TRACE MR, MODERATELY INCREASED LV WALL THICKNESS, NORMAL LV SYSTOLIC FUNCTION, SEVERE PULMONARY HTN  - CARDIOLOGY CONSULT    # EPISODE OF HYPOGLYCEMIA - IMPROVED  # GASTROPARESIS  # UNDERLYING DM  - SSI + FS  - COUNSELLED TO COMPLY WITH HD TO REDUCE UREMIA    - REPEATEDLY COUNSELLED TO COMPLY WITH FINGERSTICKS      # DEPRESSION  - PLACED ON ZOLOFT  - PSYCHIATRY CONSULT    # PATIENT UNDERGOING OUTPATIENT WORKUP FOR CENTRAL VENOUS STENOSIS THROUGH NEPHROLOGY TEAM  - ? s/p STENT PLACEMENT    # ANEMIA OF CKD  # HX OF PANCYTOPENIA   - TREND HGB, TRANSFUSION THRESHOLD HGB < 7; PATIENT STILL INTERMITTENTLY REFUSING BLOODWORK, COUNSELLED TO COMPLY  - TYPE AND SCREEN  - ON EPO  - DENIES RECENT HEMATEMESIS, MELENA, HEMATOCHEZIA    - S/P RECENT PRBC TRANSFUSION  - S/P PRBC TRANSFUSION     - PPI BID, CARAFATE QID    # SEVERE PROTEIN CALORIE MALNUTRITION, FAILURE TO THRIVE   -  TEMPORAL WASTING, LOSS OF MUSCLE MASS FROM SHOULDER AND HIP GIRDLE  - NUTRITIONAL SUPPLEMENT    # HLD  # PARTIALLY BLIND  # S/P LEFT BKA  # HX OF PVD  # LS SPINAL STENOSIS  # GI AND DVT PPX   Patient is a 62y old  Male who presents with a chief complaint of Missed dialysis (21 Aug 2023 21:28)    PATIENT IS SEEN AND EXAMINED IN MEDICAL FLOOR.      ALLERGIES:  No Known Drug Allergies  fish (Rash)  liver (Anaphylaxis)      Daily     Daily Weight in k.1 (21 Aug 2023 14:05)    VITALS:    Vital Signs Last 24 Hrs  T(C): 36.7 (22 Aug 2023 05:38), Max: 38.2 (21 Aug 2023 20:35)  T(F): 98 (22 Aug 2023 05:38), Max: 100.8 (21 Aug 2023 20:35)  HR: 92 (22 Aug 2023 05:38) (85 - 96)  BP: 135/58 (22 Aug 2023 05:38) (93/48 - 135/58)  BP(mean): 59 (21 Aug 2023 20:35) (59 - 59)  RR: 18 (22 Aug 2023 05:38) (17 - 18)  SpO2: 98% (22 Aug 2023 05:38) (98% - 100%)    Parameters below as of 22 Aug 2023 05:38  Patient On (Oxygen Delivery Method): room air        LABS:    CBC Full  -  ( 21 Aug 2023 12:00 )  WBC Count : 2.28 K/uL  RBC Count : 3.60 M/uL  Hemoglobin : 9.9 g/dL  Hematocrit : 32.2 %  Platelet Count - Automated : 96 K/uL  Mean Cell Volume : 89.4 fl  Mean Cell Hemoglobin : 27.5 pg  Mean Cell Hemoglobin Concentration : 30.7 gm/dL  Auto Neutrophil # : x  Auto Lymphocyte # : x  Auto Monocyte # : x  Auto Eosinophil # : x  Auto Basophil # : x  Auto Neutrophil % : x  Auto Lymphocyte % : x  Auto Monocyte % : x  Auto Eosinophil % : x  Auto Basophil % : x      08-    123<L>  |  97  |  58<H>  ----------------------------<  200<H>  5.6<H>   |  12<L>  |  12.00<H>    Ca    7.2<L>      21 Aug 2023 12:00  Phos  6.6     08-  Mg     2.8     08-    TPro  6.7  /  Alb  2.9<L>  /  TBili  0.4  /  DBili  x   /  AST  50<H>  /  ALT  73<H>  /  AlkPhos  134<H>  08    CAPILLARY BLOOD GLUCOSE      POCT Blood Glucose.: 179 mg/dL (22 Aug 2023 07:50)        LIVER FUNCTIONS - ( 21 Aug 2023 12:00 )  Alb: 2.9 g/dL / Pro: 6.7 g/dL / ALK PHOS: 134 U/L / ALT: 73 U/L DA / AST: 50 U/L / GGT: x           Creatinine Trend: 12.00<--, 12.30<--, 10.70<--, 11.70<--, 12.90<--, 11.70<--  I&O's Summary    21 Aug 2023 07:01  -  22 Aug 2023 07:00  --------------------------------------------------------  IN: 400 mL / OUT: 2000 mL / NET: -1600 mL        .Blood Blood-Peripheral  - @ 14:07   No Growth Final  --  --        MEDICATIONS:    MEDICATIONS  (STANDING):  albuterol    90 MICROgram(s) HFA Inhaler 2 Puff(s) Inhalation every 6 hours  amLODIPine   Tablet 10 milliGRAM(s) Oral daily  aspirin enteric coated 81 milliGRAM(s) Oral daily  atorvastatin 40 milliGRAM(s) Oral at bedtime  budesonide 160 MICROgram(s)/formoterol 4.5 MICROgram(s) Inhaler 2 Puff(s) Inhalation two times a day  chlorhexidine 2% Cloths 1 Application(s) Topical daily  dextrose 5%. 1000 milliLiter(s) (50 mL/Hr) IV Continuous <Continuous>  dextrose 5%. 1000 milliLiter(s) (100 mL/Hr) IV Continuous <Continuous>  dextrose 50% Injectable 25 Gram(s) IV Push once  dextrose 50% Injectable 12.5 Gram(s) IV Push once  dextrose 50% Injectable 25 Gram(s) IV Push once  epoetin beverley (PROCRIT) Injectable 65835 Unit(s) IV Push <User Schedule>  epoetin beverley-epbx (RETACRIT) Injectable 01315 Unit(s) IV Push <User Schedule>  folic acid 1 milliGRAM(s) Oral daily  furosemide    Tablet 40 milliGRAM(s) Oral daily  glucagon  Injectable 1 milliGRAM(s) IntraMuscular once  hydrALAZINE 25 milliGRAM(s) Oral three times a day  lactobacillus acidophilus 1 Tablet(s) Oral two times a day with meals  latanoprost 0.005% Ophthalmic Solution 1 Drop(s) Both EYES at bedtime  levothyroxine 25 MICROGram(s) Oral daily  lidocaine   4% Patch 2 Patch Transdermal daily  LORazepam     Tablet 2 milliGRAM(s) Oral <User Schedule>  melatonin 3 milliGRAM(s) Oral at bedtime  metoprolol succinate ER 50 milliGRAM(s) Oral daily  pantoprazole    Tablet 40 milliGRAM(s) Oral before breakfast  sertraline 200 milliGRAM(s) Oral daily  sevelamer carbonate 800 milliGRAM(s) Oral three times a day with meals  sucralfate 1 Gram(s) Oral four times a day  vancomycin    Solution 125 milliGRAM(s) Oral every 6 hours      MEDICATIONS  (PRN):  acetaminophen     Tablet .. 1000 milliGRAM(s) Oral every 8 hours PRN Moderate Pain (4 - 6)  dextrose Oral Gel 15 Gram(s) Oral once PRN Blood Glucose LESS THAN 70 milliGRAM(s)/deciliter  ondansetron   Disintegrating Tablet 4 milliGRAM(s) Oral two times a day PRN Nausea and/or Vomiting  ondansetron Injectable 4 milliGRAM(s) IV Push every 6 hours PRN Nausea and/or Vomiting      REVIEW OF SYSTEMS:                           ALL ROS DONE [ X   ]    CONSTITUTIONAL:  LETHARGIC [   ], FEVER [   ], UNRESPONSIVE [   ]  CVS:  CP  [   ], SOB, [   ], PALPITATIONS [   ], DIZZYNESS [   ]  RS: COUGH [   ], SPUTUM [   ]  GI: ABDOMINAL PAIN [   ], NAUSEA [   ], VOMITINGS [   ], DIARRHEA [   ], CONSTIPATION [   ]  :  DYSURIA [   ], NOCTURIA [   ], INCREASED FREQUENCY [   ], DRIBLING [   ],  SKELETAL: PAINFUL JOINTS [   ], SWOLLEN JOINTS [   ], NECK ACHE [   ], LOW BACK ACHE [   ],  SKIN : ULCERS [   ], RASH [   ], ITCHING [   ]  CNS: HEAD ACHE [   ], DOUBLE VISION [   ], BLURRED VISION [   ], AMS / CONFUSION [   ], SEIZURES [   ], WEAKNESS [   ],TINGLING / NUMBNESS [   ]    PHYSICAL EXAM:    GENERAL APPEARANCE: NO DISTRESS,    ANASARCA ++ [IMPROVED]  HEENT:  NO PALLOR, NO  JVD,  NO   NODES, NECK SUPPLE  CVS: S1 +, S2 +,   RS: AEEB,  OCCASIONAL  RALES +,   CRACKLES+ AT LUNG BASES [IMPROVED]  ABD: SOFT, NT, NO, BS +  EXT: LEFT BKA  SKIN: WARM,   SKELETAL:  ROM ACCEPTABLE  CNS:  AAO X  2-3        RADIOLOGY :    RADIOLOGY AND READINGS REVIEWED      ASSESSMENT :     Hypervolemia    Hypertension    Adrenal insufficiency    CKD (chronic kidney disease)    Anemia    Glaucoma    Coronary artery disease    HLD (hyperlipidemia)    Peripheral vascular disease    Spinal stenosis of lumbosacral region    Hyperparathyroidism    Diabetes mellitus    Diabetic neuropathy    Contracture of hand    Osteoarthritis    Osteoporosis    Vision loss of left eye    ESRD on hemodialysis    Cataract    BPH (benign prostatic hyperplasia)    UTI (urinary tract infection)    Bladder mass    H/O hematuria    Osteoporosis    Vision loss of right eye    Depression    Chronic GERD    Osteomyelitis of vertebra    CHF (congestive heart failure)    Below knee amputation status, left    History of right cataract extraction    History of left cataract extraction    S/P arteriovenous (AV) fistula creation    H/O hematuria    H/O transurethral destruction of bladder lesion    History of excision of mass        PLAN:  HPI:  Patient is 62 year old male from [facility], walks with RW, with PMH of ESRD on HD (TTS), BKA- L w/prosthetic leg, DM, Left eye blindness, CHF, CAD, HTN, HLD, osteoporosis, bronchitis, current smoker, and anemia who presents with complaint of missed dialysis. Last HD . Pt states he often missed HD sessions.  patient states he missed this HD session due to mild pain in left leg, patient remains able to ambulate. Pt complains of right leg swelling and states he does not make any urine. Patient states he was having mild shortness of breath.  Pt denies any fever, chills, N/V/D, constipation, CP, numbness or tingling (2023 19:20)    # [8/9] MEETING HELD WITH PATIENT, BROTHER ARTEMIO @ [phone] AND SW TEAM. PATIENT W/ HISTORY OF ROUTINE NONCOMPLIANCE W/ LIFE-SUSTAINING TREATMENT [HD], EVALUATIONS AND INTERVENTIONS - COUNSELLED AT LENGTH TO REMAIN COMPLIANT. DISCUSSED THAT PROGNOSIS IS POOR/GRIM GIVEN UNDERLYING MEDICAL CONDITIONS AND GIVEN NONCOMPLIANCE. HE VERBALIZED UNDERSTANDING. REGARDING GOC - PATIENT WISHES FOR FULL CODE. PALLIATIVE CARE CONSULTED. PSYCHIATRY CONSULTED. PATIENT EXPRESSED THAT HE DOES GROW IMPATIENT DURING DIALYSIS SESSIONS AND HAS BEEN LEAVING SESSIONS SOONER THAN PRESCRIBED. IN DISCUSSION THAT RISKS OF DEFERRING COMPLETE HD - INCLUDE BUT ARE NOT LIMITED TO WORSENING CLINICAL CONDITION, ELECTROLYTE ABNORMALITIES, ARRHYTHMIA AND DEATH. HE VERBALIZED UNDERSTANDING. D/W PATIENT THAT REPEATEDLY REFUSING INTERVENTIONS AND ASSESSMENTS IS NOT IN LINE WITH HIS WISHES TO IMPROVE. PATIENT VERBALIZED UNDERSTANDING. DISCUSSED REGARDING CURRENT CLINICAL CONDITION, RECOMMENDED EVALUATIONS AND INTERVENTIONS. ALL QUESTIONS ANSWERED. TEAM HAS OFFERED PATIENT EMOTIONAL SUPPORT AND OFFERED MEDICATION FOR MOOD. OFFERED TO PRE-MEDICATE W/ ANXIOLYTIC PRIOR TO HD. PATIENT IS AGREEABLE.     DISCUSSED THAT PATIENT WILL NEED TO COMPLY WITH HD SESSIONS IN ORDER TO BE MEDICALLY OPTIMIZED FOR DISCHARGE AND FOR SAFE D/C PLANNING. TEAM DISCUSSED OPTIONS OF RETURNING TO [facility] W/ OUTPATIENT HD , IF CARE NEEDS CAN BE SAFELY MET VS. NURSING HOME/[facility] . PATIENT AND BROTHER VERBALIZED UNDERSTANDING.     - SW AND MEDICAL TEAM HAVING ONGOING DISCUSSION WITH PATIENT REGARDING CONSISTENT COMPLIANCE IN ORDER TO RETURN TO HD IN THE OUTPATIENT SETTING  -- CONVEYED THAT PATIENT AND TEAM THAT PATIENT WILL NEED TO CONSISTENTLY COMPLETE FULL HD SESSIONS IN ORDER TO BE CONSIDERED BY OUTPATIENT HD AT NH W/ ONSITE HD OR OUTSIDE HD FACILITY    - PATIENT REQUIRING MORE FREQUENT WEEKLY HD SESSIONS    # FEVER   # R/O INFECTION   # R/O COLITIS    - PATIENT REFUSING IV LINE PLACEMENT AND BLOOD WORK; RISKS OF DEFERRING DISCUSSED, PATIENT VERBALIZED UNDERSTANDING  - PLANNED FOR BLOOD CULTURE, CBC, CMP - WITH HEMODIALYSIS  - PLACED ON PO VANCOMYCIN  - F/U ABD XRAY, F/U CXRAY  - ID CONSULT    # ACUTE ON CHRONIC HYPOXIC RESPIRATORY FAILURE S/T PULMONARY EDEMA - S/T INCOMPLETE HD SESSIONS ; ANASARCA  # HYPERKALEMIA  # HX OF COPD + S/P RECENT MULTIFOCAL PNEUMONIA ; R/O ASPIRATION PNEUMONIA  # PULMONARY HYPERTENSION  # UNCONTROLLED HTN  # ESRD ON HD TTS - W/ HX OF NONCOMPLIANCE    - TELEMETRY  - HYDRALAZINE, METOPROLOL AND NORVASC ; PRN IV ANTIHYPERTENSIVE  - LOKELMA  - SUPPLEMENTAL O2  - PLANNED FOR HD  - NEPHROLOGY CONSULT  - PULMONOLOGY CONSULT  - ID CONSULT    - CONTINUES TO REFUSE OR PRE-MATURELY DISCONTINUE SESSIONS OF HD       - [] - OVERNIGHT RAPID RESPONSE S/T HYPOXIA - SELF-LIMITED - PATIENT IMPROVED S/P O2 SUPPLEMENT  - S/P CEFEPIME + FLAGYLL, BCX - NGTD      # RECURRENT INTRACTABLE NAUSEA/VOMITING, ? COFFEE GROUND EMESIS  # GASTROPARESIS  # HISTORY OF ESOPHAGITIS AND DUODENITIS [2021], HX OF GASTROPARESIS W/ EVIDENCE OF THICKENED ESOPHAGUS ON PREVIOUS CT SCAN   # PANCREAS W/ DOUBLE DUCT SIGN    - MONITORING HGB, PLACED ON PPI BID , CARAFATE QID  - PRN ANTIEMETICS  ; S/P DOSE OF REGLAN [WITH MINIMAL IMPROVEMENT]  - MONITORING FOR SYMPTOMS  - WHILE ON DIET PATIENT REPEATEDLY COUNSELLED TO CHEW FOOD CAUTIOUSLY AND CONSUME SMALL BITES W/ REGULAR CLEARING WITH WATER BETWEEN BITES    - ADVANCE DIET AS TOLERATED  - NOTED CT A/P    - S/P RECENT PRBC TRANSFUSION     - GI CONSULT  - SURGERY CONSULT    - [] - EPISODE OF NAUSEA/VOMITING OVER THE WEEKEND - IMPROVED    # LESS LIKELY CHOLECYSTITIS  - S/P ABX  - NOTED CT A/P  - HIDA SCAN - NEGATIVE  - SURGERY CONSULT    # ELEVATED TROPONINS - ? DEMAND ISCHEMIA    - S/P TELEMETRY  - TREND TROPONINS  - ECHO - TRACE MR, MODERATELY INCREASED LV WALL THICKNESS, NORMAL LV SYSTOLIC FUNCTION, SEVERE PULMONARY HTN  - CARDIOLOGY CONSULT    # EPISODE OF HYPOGLYCEMIA - IMPROVED  # GASTROPARESIS  # UNDERLYING DM  - SSI + FS  - COUNSELLED TO COMPLY WITH HD TO REDUCE UREMIA    - REPEATEDLY COUNSELLED TO COMPLY WITH FINGERSTICKS      # DEPRESSION  - PLACED ON [employer]OL[employer]FT  - PSYCHIATRY CONSULT    # PATIENT UNDERGOING OUTPATIENT WORKUP FOR CENTRAL VENOUS STENOSIS THROUGH NEPHROLOGY TEAM  - ? s/p STENT PLACEMENT    # ANEMIA OF CKD  # HX OF PANCYTOPENIA   - TREND HGB, TRANSFUSION THRESHOLD HGB < 7; PATIENT STILL INTERMITTENTLY REFUSING BLOODWORK, COUNSELLED TO COMPLY  - TYPE AND SCREEN  - ON EPO  - DENIES RECENT HEMATEMESIS, MELENA, HEMATOCHEZIA    - S/P RECENT PRBC TRANSFUSION  - S/P PRBC TRANSFUSION     - PPI BID, CARAFATE QID    # SEVERE PROTEIN CALORIE MALNUTRITION, FAILURE TO THRIVE   -  TEMPORAL WASTING, LOSS OF MUSCLE MASS FROM SHOULDER AND HIP GIRDLE  - NUTRITIONAL SUPPLEMENT    # HLD  # PARTIALLY BLIND  # S/P LEFT BKA  # HX OF PVD  # LS SPINAL STENOSIS  # GI AND DVT PPX

## 2023-08-23 NOTE — PROGRESS NOTE ADULT - SUBJECTIVE AND OBJECTIVE BOX
[facility] : Progress Note :: [phone], (office [phone]),   Dr Mosher / Dr Washington / Dr Young / Dr Doll / Dr Varsha TURCIOS / Dr Tello / Dr Garcia / Dr Larry qiu  _____________________________________________________________________________________________    for HD today.    No Known Drug Allergies  fish (Rash)  liver (Anaphylaxis)    Hospital Medications:   MEDICATIONS  (STANDING):  albuterol    90 MICROgram(s) HFA Inhaler 2 Puff(s) Inhalation every 6 hours  amLODIPine   Tablet 10 milliGRAM(s) Oral daily  aspirin enteric coated 81 milliGRAM(s) Oral daily  atorvastatin 40 milliGRAM(s) Oral at bedtime  budesonide 160 MICROgram(s)/formoterol 4.5 MICROgram(s) Inhaler 2 Puff(s) Inhalation two times a day  chlorhexidine 2% Cloths 1 Application(s) Topical daily  dextrose 5%. 1000 milliLiter(s) (50 mL/Hr) IV Continuous <Continuous>  dextrose 5%. 1000 milliLiter(s) (100 mL/Hr) IV Continuous <Continuous>  dextrose 50% Injectable 25 Gram(s) IV Push once  dextrose 50% Injectable 12.5 Gram(s) IV Push once  dextrose 50% Injectable 25 Gram(s) IV Push once  epoetin beverley (PROCRIT) Injectable 67829 Unit(s) IV Push <User Schedule>  epoetin beverley-epbx (RETACRIT) Injectable 04824 Unit(s) IV Push <User Schedule>  folic acid 1 milliGRAM(s) Oral daily  furosemide    Tablet 40 milliGRAM(s) Oral daily  glucagon  Injectable 1 milliGRAM(s) IntraMuscular once  hydrALAZINE 25 milliGRAM(s) Oral three times a day  lactobacillus acidophilus 1 Tablet(s) Oral two times a day with meals  latanoprost 0.005% Ophthalmic Solution 1 Drop(s) Both EYES at bedtime  levothyroxine 25 MICROGram(s) Oral daily  lidocaine   4% Patch 2 Patch Transdermal daily  melatonin 3 milliGRAM(s) Oral at bedtime  metoprolol succinate ER 50 milliGRAM(s) Oral daily  pantoprazole    Tablet 40 milliGRAM(s) Oral before breakfast  sertraline 200 milliGRAM(s) Oral daily  sevelamer carbonate 800 milliGRAM(s) Oral three times a day with meals  sucralfate 1 Gram(s) Oral four times a day  vancomycin    Solution 125 milliGRAM(s) Oral every 6 hours      VITALS:  T(F): 98.6 (08-23-23 @ 15:57), Max: 98.6 (08-23-23 @ 15:57)  HR: 89 (08-23-23 @ 15:57)  BP: 137/67 (08-23-23 @ 15:57)  RR: 16 (08-23-23 @ 15:57)  SpO2: 100% (08-23-23 @ 15:57)  Wt(kg): --    08-22 @ 07:01  -  08-23 @ 07:00  --------------------------------------------------------  IN: 5 mL / OUT: 0 mL / NET: 5 mL        PHYSICAL EXAM:  Constitutional: NAD  HEENT: anicteric sclera, oropharynx clear.  Neck: No JVD  Respiratory: CTAB, no wheezes, rales or rhonchi  Cardiovascular: S1, S2, RRR  Gastrointestinal: BS+, soft, NT/ND  Extremities:   peripheral edema+  Neurological: A/O x 3, no focal deficits.  Vascular Access: AVF with thrill and bruit     LABS:  08-23    122<L>  |  94<L>  |  57<H>  ----------------------------<  182<H>  5.4<H>   |  17<L>  |  10.70<H>    Ca    7.9<L>      23 Aug 2023 10:30  Phos  5.6     08-23  Mg     2.9     08-23    TPro  6.8  /  Alb  3.0<L>  /  TBili  0.4  /  DBili      /  AST  27  /  ALT  54  /  AlkPhos  103  08-23    Creatinine Trend: 10.70 <--, 12.00 <--, 12.30 <--                        10.1   2.86  )-----------( 85       ( 23 Aug 2023 10:30 )             31.9     Urine Studies:  Urinalysis Basic - ( 23 Aug 2023 10:30 )    Color:  / Appearance:  / SG:  / pH:   Gluc: 182 mg/dL / Ketone:   / Bili:  / Urobili:    Blood:  / Protein:  / Nitrite:    Leuk Esterase:  / RBC:  / WBC    Sq Epi:  / Non Sq Epi:  / Bacteria:         RADIOLOGY & ADDITIONAL STUDIES:

## 2023-08-23 NOTE — PROGRESS NOTE ADULT - SUBJECTIVE AND OBJECTIVE BOX
Patient is a 62y old  Male who presents with a chief complaint of Missed dialysis (23 Aug 2023 09:30)    PATIENT IS SEEN AND EXAMINED IN MEDICAL FLOOR.  MARIIT [    ]    JEREMY [   ]      GT [   ]    ALLERGIES:  No Known Drug Allergies  fish (Rash)  liver (Anaphylaxis)      Daily     Daily     VITALS:    Vital Signs Last 24 Hrs  T(C): 36.4 (23 Aug 2023 04:45), Max: 37.2 (22 Aug 2023 13:00)  T(F): 97.6 (23 Aug 2023 04:45), Max: 99 (22 Aug 2023 13:00)  HR: 95 (23 Aug 2023 04:45) (91 - 95)  BP: 176/80 (23 Aug 2023 04:45) (132/64 - 176/80)  BP(mean): --  RR: 20 (23 Aug 2023 04:45) (18 - 20)  SpO2: 97% (23 Aug 2023 04:45) (97% - 100%)    Parameters below as of 23 Aug 2023 04:45  Patient On (Oxygen Delivery Method): room air        LABS:    CBC Full  -  ( 21 Aug 2023 12:00 )  WBC Count : 2.28 K/uL  RBC Count : 3.60 M/uL  Hemoglobin : 9.9 g/dL  Hematocrit : 32.2 %  Platelet Count - Automated : 96 K/uL  Mean Cell Volume : 89.4 fl  Mean Cell Hemoglobin : 27.5 pg  Mean Cell Hemoglobin Concentration : 30.7 gm/dL  Auto Neutrophil # : x  Auto Lymphocyte # : x  Auto Monocyte # : x  Auto Eosinophil # : x  Auto Basophil # : x  Auto Neutrophil % : x  Auto Lymphocyte % : x  Auto Monocyte % : x  Auto Eosinophil % : x  Auto Basophil % : x      08-21    123<L>  |  97  |  58<H>  ----------------------------<  200<H>  5.6<H>   |  12<L>  |  12.00<H>    Ca    7.2<L>      21 Aug 2023 12:00  Phos  6.6     08-21  Mg     2.8     08-21    TPro  6.7  /  Alb  2.9<L>  /  TBili  0.4  /  DBili  x   /  AST  50<H>  /  ALT  73<H>  /  AlkPhos  134<H>  08-21    CAPILLARY BLOOD GLUCOSE      POCT Blood Glucose.: 210 mg/dL (22 Aug 2023 21:15)  POCT Blood Glucose.: 210 mg/dL (22 Aug 2023 16:25)  POCT Blood Glucose.: 278 mg/dL (22 Aug 2023 12:03)        LIVER FUNCTIONS - ( 21 Aug 2023 12:00 )  Alb: 2.9 g/dL / Pro: 6.7 g/dL / ALK PHOS: 134 U/L / ALT: 73 U/L DA / AST: 50 U/L / GGT: x           Creatinine Trend: 12.00<--, 12.30<--, 10.70<--, 11.70<--, 12.90<--, 11.70<--  I&O's Summary    22 Aug 2023 07:01  -  23 Aug 2023 07:00  --------------------------------------------------------  IN: 5 mL / OUT: 0 mL / NET: 5 mL            .Blood Blood-Peripheral  05-28 @ 14:07   No Growth Final  --  --          MEDICATIONS:    MEDICATIONS  (STANDING):  albuterol    90 MICROgram(s) HFA Inhaler 2 Puff(s) Inhalation every 6 hours  amLODIPine   Tablet 10 milliGRAM(s) Oral daily  aspirin enteric coated 81 milliGRAM(s) Oral daily  atorvastatin 40 milliGRAM(s) Oral at bedtime  budesonide 160 MICROgram(s)/formoterol 4.5 MICROgram(s) Inhaler 2 Puff(s) Inhalation two times a day  chlorhexidine 2% Cloths 1 Application(s) Topical daily  dextrose 5%. 1000 milliLiter(s) (50 mL/Hr) IV Continuous <Continuous>  dextrose 5%. 1000 milliLiter(s) (100 mL/Hr) IV Continuous <Continuous>  dextrose 50% Injectable 25 Gram(s) IV Push once  dextrose 50% Injectable 12.5 Gram(s) IV Push once  dextrose 50% Injectable 25 Gram(s) IV Push once  epoetin beverley (PROCRIT) Injectable 28932 Unit(s) IV Push <User Schedule>  epoetin beverley-epbx (RETACRIT) Injectable 21170 Unit(s) IV Push <User Schedule>  folic acid 1 milliGRAM(s) Oral daily  furosemide    Tablet 40 milliGRAM(s) Oral daily  glucagon  Injectable 1 milliGRAM(s) IntraMuscular once  hydrALAZINE 25 milliGRAM(s) Oral three times a day  lactobacillus acidophilus 1 Tablet(s) Oral two times a day with meals  latanoprost 0.005% Ophthalmic Solution 1 Drop(s) Both EYES at bedtime  levothyroxine 25 MICROGram(s) Oral daily  lidocaine   4% Patch 2 Patch Transdermal daily  LORazepam     Tablet 2 milliGRAM(s) Oral <User Schedule>  melatonin 3 milliGRAM(s) Oral at bedtime  metoprolol succinate ER 50 milliGRAM(s) Oral daily  pantoprazole    Tablet 40 milliGRAM(s) Oral before breakfast  sertraline 200 milliGRAM(s) Oral daily  sevelamer carbonate 800 milliGRAM(s) Oral three times a day with meals  sucralfate 1 Gram(s) Oral four times a day  vancomycin    Solution 125 milliGRAM(s) Oral every 6 hours      MEDICATIONS  (PRN):  acetaminophen     Tablet .. 1000 milliGRAM(s) Oral every 8 hours PRN Moderate Pain (4 - 6)  dextrose Oral Gel 15 Gram(s) Oral once PRN Blood Glucose LESS THAN 70 milliGRAM(s)/deciliter  ondansetron   Disintegrating Tablet 4 milliGRAM(s) Oral two times a day PRN Nausea and/or Vomiting  ondansetron Injectable 4 milliGRAM(s) IV Push every 6 hours PRN Nausea and/or Vomiting      REVIEW OF SYSTEMS:                           ALL ROS DONE [ X   ]    CONSTITUTIONAL:  LETHARGIC [   ], FEVER [   ], UNRESPONSIVE [   ]  CVS:  CP  [   ], SOB, [   ], PALPITATIONS [   ], DIZZYNESS [   ]  RS: COUGH [   ], SPUTUM [   ]  GI: ABDOMINAL PAIN [   ], NAUSEA [   ], VOMITINGS [   ], DIARRHEA [   ], CONSTIPATION [   ]  :  DYSURIA [   ], NOCTURIA [   ], INCREASED FREQUENCY [   ], DRIBLING [   ],  SKELETAL: PAINFUL JOINTS [   ], SWOLLEN JOINTS [   ], NECK ACHE [   ], LOW BACK ACHE [   ],  SKIN : ULCERS [   ], RASH [   ], ITCHING [   ]  CNS: HEAD ACHE [   ], DOUBLE VISION [   ], BLURRED VISION [   ], AMS / CONFUSION [   ], SEIZURES [   ], WEAKNESS [   ],TINGLING / NUMBNESS [   ]      PHYSICAL EXAM:    GENERAL APPEARANCE: NO DISTRESS,    ANASARCA ++ [IMPROVED]  HEENT:  NO PALLOR, NO  JVD,  NO   NODES, NECK SUPPLE  CVS: S1 +, S2 +,   RS: AEEB,  OCCASIONAL  RALES +,   CRACKLES+ AT LUNG BASES [IMPROVED]  ABD: SOFT, NT, NO, BS +  EXT: LEFT BKA  SKIN: WARM,   SKELETAL:  ROM ACCEPTABLE  CNS:  AAO X  2-3        RADIOLOGY :    RADIOLOGY AND READINGS REVIEWED      ASSESSMENT :     Hypervolemia    Hypertension    Adrenal insufficiency    CKD (chronic kidney disease)    Anemia    Glaucoma    Coronary artery disease    HLD (hyperlipidemia)    Peripheral vascular disease    Spinal stenosis of lumbosacral region    Hyperparathyroidism    Diabetes mellitus    Diabetic neuropathy    Contracture of hand    Osteoarthritis    Osteoporosis    Vision loss of left eye    ESRD on hemodialysis    Cataract    BPH (benign prostatic hyperplasia)    UTI (urinary tract infection)    Bladder mass    H/O hematuria    Osteoporosis    Vision loss of right eye    Depression    Chronic GERD    Osteomyelitis of vertebra    CHF (congestive heart failure)    Below knee amputation status, left    History of right cataract extraction    History of left cataract extraction    S/P arteriovenous (AV) fistula creation    H/O hematuria    H/O transurethral destruction of bladder lesion    History of excision of mass        PLAN:  HPI:  Patient is 62 year old male from [facility], walks with RW, with PMH of ESRD on HD (TTS), BKA- L w/prosthetic leg, DM, Left eye blindness, CHF, CAD, HTN, HLD, osteoporosis, bronchitis, current smoker, and anemia who presents with complaint of missed dialysis. Last HD 7/22. Pt states he often missed HD sessions.  patient states he missed this HD session due to mild pain in left leg, patient remains able to ambulate. Pt complains of right leg swelling and states he does not make any urine. Patient states he was having mild shortness of breath.  Pt denies any fever, chills, N/V/D, constipation, CP, numbness or tingling (26 Jul 2023 19:20)    # [8/9] MEETING HELD WITH PATIENT, BROTHER ARTEMIO @ [phone] AND  TEAM. PATIENT W/ HISTORY OF ROUTINE NONCOMPLIANCE W/ LIFE-SUSTAINING TREATMENT [HD], EVALUATIONS AND INTERVENTIONS - COUNSELLED AT LENGTH TO REMAIN COMPLIANT. DISCUSSED THAT PROGNOSIS IS POOR/GRIM GIVEN UNDERLYING MEDICAL CONDITIONS AND GIVEN NONCOMPLIANCE. HE VERBALIZED UNDERSTANDING. REGARDING GOC - PATIENT WISHES FOR FULL CODE. PALLIATIVE CARE CONSULTED. PSYCHIATRY CONSULTED. PATIENT EXPRESSED THAT HE DOES GROW IMPATIENT DURING DIALYSIS SESSIONS AND HAS BEEN LEAVING SESSIONS SOONER THAN PRESCRIBED. IN DISCUSSION THAT RISKS OF DEFERRING COMPLETE HD - INCLUDE BUT ARE NOT LIMITED TO WORSENING CLINICAL CONDITION, ELECTROLYTE ABNORMALITIES, ARRHYTHMIA AND DEATH. HE VERBALIZED UNDERSTANDING. D/W PATIENT THAT REPEATEDLY REFUSING INTERVENTIONS AND ASSESSMENTS IS NOT IN LINE WITH HIS WISHES TO IMPROVE. PATIENT VERBALIZED UNDERSTANDING. DISCUSSED REGARDING CURRENT CLINICAL CONDITION, RECOMMENDED EVALUATIONS AND INTERVENTIONS. ALL QUESTIONS ANSWERED. TEAM HAS OFFERED PATIENT EMOTIONAL SUPPORT AND OFFERED MEDICATION FOR MOOD. OFFERED TO PRE-MEDICATE W/ ANXIOLYTIC PRIOR TO HD. PATIENT IS AGREEABLE.     DISCUSSED THAT PATIENT WILL NEED TO COMPLY WITH HD SESSIONS IN ORDER TO BE MEDICALLY OPTIMIZED FOR DISCHARGE AND FOR SAFE D/C PLANNING. TEAM DISCUSSED OPTIONS OF RETURNING TO [facility] W/ OUTPATIENT HD , IF CARE NEEDS CAN BE SAFELY MET VS. NURSING HOME/[facility] . PATIENT AND BROTHER VERBALIZED UNDERSTANDING.     - SW AND MEDICAL TEAM HAVING ONGOING DISCUSSION WITH PATIENT REGARDING CONSISTENT COMPLIANCE IN ORDER TO RETURN TO HD IN THE OUTPATIENT SETTING  -- CONVEYED THAT PATIENT AND TEAM THAT PATIENT WILL NEED TO CONSISTENTLY COMPLETE FULL HD SESSIONS IN ORDER TO BE CONSIDERED BY OUTPATIENT HD AT NH W/ ONSITE HD OR OUTSIDE HD FACILITY    - PATIENT REQUIRING MORE FREQUENT WEEKLY HD SESSIONS    # FEVER   # R/O INFECTION   # R/O COLITIS    - PATIENT REFUSING IV LINE PLACEMENT AND BLOOD WORK; RISKS OF DEFERRING DISCUSSED, PATIENT VERBALIZED UNDERSTANDING  - PLANNED FOR BLOOD CULTURE, CBC, CMP - WITH HEMODIALYSIS  - PLACED ON PO VANCOMYCIN  - F/U ABD XRAY, F/U CXRAY  - ID CONSULT    # ACUTE ON CHRONIC HYPOXIC RESPIRATORY FAILURE S/T PULMONARY EDEMA - S/T INCOMPLETE HD SESSIONS ; ANASARCA  # HYPERKALEMIA  # HX OF COPD + S/P RECENT MULTIFOCAL PNEUMONIA ; R/O ASPIRATION PNEUMONIA  # PULMONARY HYPERTENSION  # UNCONTROLLED HTN  # ESRD ON HD TTS - W/ HX OF NONCOMPLIANCE    - TELEMETRY  - HYDRALAZINE, METOPROLOL AND NORVASC ; PRN IV ANTIHYPERTENSIVE  - LOKELMA  - SUPPLEMENTAL O2  - PLANNED FOR HD  - NEPHROLOGY CONSULT  - PULMONOLOGY CONSULT  - ID CONSULT    - CONTINUES TO REFUSE OR PRE-MATURELY DISCONTINUE SESSIONS OF HD       - [7/30] - OVERNIGHT RAPID RESPONSE S/T HYPOXIA - SELF-LIMITED - PATIENT IMPROVED S/P O2 SUPPLEMENT  - S/P CEFEPIME + FLAGYLL, BCX - NGTD      # RECURRENT INTRACTABLE NAUSEA/VOMITING, ? COFFEE GROUND EMESIS  # GASTROPARESIS  # HISTORY OF ESOPHAGITIS AND DUODENITIS [1/2021], HX OF GASTROPARESIS W/ EVIDENCE OF THICKENED ESOPHAGUS ON PREVIOUS CT SCAN   # PANCREAS W/ DOUBLE DUCT SIGN    - MONITORING HGB, PLACED ON PPI BID , CARAFATE QID  - PRN ANTIEMETICS  ; S/P DOSE OF REGLAN [WITH MINIMAL IMPROVEMENT]  - MONITORING FOR SYMPTOMS  - WHILE ON DIET PATIENT REPEATEDLY COUNSELLED TO CHEW FOOD CAUTIOUSLY AND CONSUME SMALL BITES W/ REGULAR CLEARING WITH WATER BETWEEN BITES    - ADVANCE DIET AS TOLERATED  - NOTED CT A/P    - S/P RECENT PRBC TRANSFUSION     - GI CONSULT  - SURGERY CONSULT    - [8/14] - EPISODE OF NAUSEA/VOMITING OVER THE WEEKEND - IMPROVED    # LESS LIKELY CHOLECYSTITIS  - S/P ABX  - NOTED CT A/P  - HIDA SCAN - NEGATIVE  - SURGERY CONSULT    # ELEVATED TROPONINS - ? DEMAND ISCHEMIA    - S/P TELEMETRY  - TREND TROPONINS  - ECHO - TRACE MR, MODERATELY INCREASED LV WALL THICKNESS, NORMAL LV SYSTOLIC FUNCTION, SEVERE PULMONARY HTN  - CARDIOLOGY CONSULT    # EPISODE OF HYPOGLYCEMIA - IMPROVED  # GASTROPARESIS  # UNDERLYING DM  - SSI + FS  - COUNSELLED TO COMPLY WITH HD TO REDUCE UREMIA    - REPEATEDLY COUNSELLED TO COMPLY WITH FINGERSTICKS      # DEPRESSION  - PLACED ON ZOLOFT  - PSYCHIATRY CONSULT    # PATIENT UNDERGOING OUTPATIENT WORKUP FOR CENTRAL VENOUS STENOSIS THROUGH NEPHROLOGY TEAM  - ? s/p STENT PLACEMENT    # ANEMIA OF CKD  # HX OF PANCYTOPENIA   - TREND HGB, TRANSFUSION THRESHOLD HGB < 7; PATIENT STILL INTERMITTENTLY REFUSING BLOODWORK, COUNSELLED TO COMPLY  - TYPE AND SCREEN  - ON EPO  - DENIES RECENT HEMATEMESIS, MELENA, HEMATOCHEZIA    - S/P RECENT PRBC TRANSFUSION  - S/P PRBC TRANSFUSION 7/29    - PPI BID, CARAFATE QID    # SEVERE PROTEIN CALORIE MALNUTRITION, FAILURE TO THRIVE   -  TEMPORAL WASTING, LOSS OF MUSCLE MASS FROM SHOULDER AND HIP GIRDLE  - NUTRITIONAL SUPPLEMENT    # HLD  # PARTIALLY BLIND  # S/P LEFT BKA  # HX OF PVD  # LS SPINAL STENOSIS  # GI AND DVT PPX   Patient is a 62y old  Male who presents with a chief complaint of Missed dialysis (23 Aug 2023 09:30)    PATIENT IS SEEN AND EXAMINED IN MEDICAL FLOOR.      ALLERGIES:  No Known Drug Allergies  fish (Rash)  liver (Anaphylaxis)      VITALS:    Vital Signs Last 24 Hrs  T(C): 36.4 (23 Aug 2023 04:45), Max: 37.2 (22 Aug 2023 13:00)  T(F): 97.6 (23 Aug 2023 04:45), Max: 99 (22 Aug 2023 13:00)  HR: 95 (23 Aug 2023 04:45) (91 - 95)  BP: 176/80 (23 Aug 2023 04:45) (132/64 - 176/80)  BP(mean): --  RR: 20 (23 Aug 2023 04:45) (18 - 20)  SpO2: 97% (23 Aug 2023 04:45) (97% - 100%)    Parameters below as of 23 Aug 2023 04:45  Patient On (Oxygen Delivery Method): room air        LABS:    CBC Full  -  ( 21 Aug 2023 12:00 )  WBC Count : 2.28 K/uL  RBC Count : 3.60 M/uL  Hemoglobin : 9.9 g/dL  Hematocrit : 32.2 %  Platelet Count - Automated : 96 K/uL  Mean Cell Volume : 89.4 fl  Mean Cell Hemoglobin : 27.5 pg  Mean Cell Hemoglobin Concentration : 30.7 gm/dL  Auto Neutrophil # : x  Auto Lymphocyte # : x  Auto Monocyte # : x  Auto Eosinophil # : x  Auto Basophil # : x  Auto Neutrophil % : x  Auto Lymphocyte % : x  Auto Monocyte % : x  Auto Eosinophil % : x  Auto Basophil % : x      08-21    123<L>  |  97  |  58<H>  ----------------------------<  200<H>  5.6<H>   |  12<L>  |  12.00<H>    Ca    7.2<L>      21 Aug 2023 12:00  Phos  6.6     08-21  Mg     2.8     08-21    TPro  6.7  /  Alb  2.9<L>  /  TBili  0.4  /  DBili  x   /  AST  50<H>  /  ALT  73<H>  /  AlkPhos  134<H>  08-21    CAPILLARY BLOOD GLUCOSE      POCT Blood Glucose.: 210 mg/dL (22 Aug 2023 21:15)  POCT Blood Glucose.: 210 mg/dL (22 Aug 2023 16:25)  POCT Blood Glucose.: 278 mg/dL (22 Aug 2023 12:03)        LIVER FUNCTIONS - ( 21 Aug 2023 12:00 )  Alb: 2.9 g/dL / Pro: 6.7 g/dL / ALK PHOS: 134 U/L / ALT: 73 U/L DA / AST: 50 U/L / GGT: x           Creatinine Trend: 12.00<--, 12.30<--, 10.70<--, 11.70<--, 12.90<--, 11.70<--  I&O's Summary    22 Aug 2023 07:01  -  23 Aug 2023 07:00  --------------------------------------------------------  IN: 5 mL / OUT: 0 mL / NET: 5 mL            .Blood Blood-Peripheral  05-28 @ 14:07   No Growth Final  --  --          MEDICATIONS:    MEDICATIONS  (STANDING):  albuterol    90 MICROgram(s) HFA Inhaler 2 Puff(s) Inhalation every 6 hours  amLODIPine   Tablet 10 milliGRAM(s) Oral daily  aspirin enteric coated 81 milliGRAM(s) Oral daily  atorvastatin 40 milliGRAM(s) Oral at bedtime  budesonide 160 MICROgram(s)/formoterol 4.5 MICROgram(s) Inhaler 2 Puff(s) Inhalation two times a day  chlorhexidine 2% Cloths 1 Application(s) Topical daily  dextrose 5%. 1000 milliLiter(s) (50 mL/Hr) IV Continuous <Continuous>  dextrose 5%. 1000 milliLiter(s) (100 mL/Hr) IV Continuous <Continuous>  dextrose 50% Injectable 25 Gram(s) IV Push once  dextrose 50% Injectable 12.5 Gram(s) IV Push once  dextrose 50% Injectable 25 Gram(s) IV Push once  epoetin beverley (PROCRIT) Injectable 16719 Unit(s) IV Push <User Schedule>  epoetin beverley-epbx (RETACRIT) Injectable 69794 Unit(s) IV Push <User Schedule>  folic acid 1 milliGRAM(s) Oral daily  furosemide    Tablet 40 milliGRAM(s) Oral daily  glucagon  Injectable 1 milliGRAM(s) IntraMuscular once  hydrALAZINE 25 milliGRAM(s) Oral three times a day  lactobacillus acidophilus 1 Tablet(s) Oral two times a day with meals  latanoprost 0.005% Ophthalmic Solution 1 Drop(s) Both EYES at bedtime  levothyroxine 25 MICROGram(s) Oral daily  lidocaine   4% Patch 2 Patch Transdermal daily  LORazepam     Tablet 2 milliGRAM(s) Oral <User Schedule>  melatonin 3 milliGRAM(s) Oral at bedtime  metoprolol succinate ER 50 milliGRAM(s) Oral daily  pantoprazole    Tablet 40 milliGRAM(s) Oral before breakfast  sertraline 200 milliGRAM(s) Oral daily  sevelamer carbonate 800 milliGRAM(s) Oral three times a day with meals  sucralfate 1 Gram(s) Oral four times a day  vancomycin    Solution 125 milliGRAM(s) Oral every 6 hours      MEDICATIONS  (PRN):  acetaminophen     Tablet .. 1000 milliGRAM(s) Oral every 8 hours PRN Moderate Pain (4 - 6)  dextrose Oral Gel 15 Gram(s) Oral once PRN Blood Glucose LESS THAN 70 milliGRAM(s)/deciliter  ondansetron   Disintegrating Tablet 4 milliGRAM(s) Oral two times a day PRN Nausea and/or Vomiting  ondansetron Injectable 4 milliGRAM(s) IV Push every 6 hours PRN Nausea and/or Vomiting      REVIEW OF SYSTEMS:                           ALL ROS DONE [ X   ]    CONSTITUTIONAL:  LETHARGIC [   ], FEVER [   ], UNRESPONSIVE [   ]  CVS:  CP  [   ], SOB, [   ], PALPITATIONS [   ], DIZZYNESS [   ]  RS: COUGH [   ], SPUTUM [   ]  GI: ABDOMINAL PAIN [   ], NAUSEA [   ], VOMITINGS [   ], DIARRHEA [   ], CONSTIPATION [   ]  :  DYSURIA [   ], NOCTURIA [   ], INCREASED FREQUENCY [   ], DRIBLING [   ],  SKELETAL: PAINFUL JOINTS [   ], SWOLLEN JOINTS [   ], NECK ACHE [   ], LOW BACK ACHE [   ],  SKIN : ULCERS [   ], RASH [   ], ITCHING [   ]  CNS: HEAD ACHE [   ], DOUBLE VISION [   ], BLURRED VISION [   ], AMS / CONFUSION [   ], SEIZURES [   ], WEAKNESS [   ],TINGLING / NUMBNESS [   ]      PHYSICAL EXAM:    GENERAL APPEARANCE: NO DISTRESS,    ANASARCA ++ [IMPROVED]  HEENT:  NO PALLOR, NO  JVD,  NO   NODES, NECK SUPPLE  CVS: S1 +, S2 +,   RS: AEEB,  OCCASIONAL  RALES +,   CRACKLES+ AT LUNG BASES [IMPROVED]  ABD: SOFT, NT, NO, BS +  EXT: LEFT BKA  SKIN: WARM,   SKELETAL:  ROM ACCEPTABLE  CNS:  AAO X  2-3        RADIOLOGY :    RADIOLOGY AND READINGS REVIEWED      ASSESSMENT :     Hypervolemia    Hypertension    Adrenal insufficiency    CKD (chronic kidney disease)    Anemia    Glaucoma    Coronary artery disease    HLD (hyperlipidemia)    Peripheral vascular disease    Spinal stenosis of lumbosacral region    Hyperparathyroidism    Diabetes mellitus    Diabetic neuropathy    Contracture of hand    Osteoarthritis    Osteoporosis    Vision loss of left eye    ESRD on hemodialysis    Cataract    BPH (benign prostatic hyperplasia)    UTI (urinary tract infection)    Bladder mass    H/O hematuria    Osteoporosis    Vision loss of right eye    Depression    Chronic GERD    Osteomyelitis of vertebra    CHF (congestive heart failure)    Below knee amputation status, left    History of right cataract extraction    History of left cataract extraction    S/P arteriovenous (AV) fistula creation    H/O hematuria    H/O transurethral destruction of bladder lesion    History of excision of mass        PLAN:  HPI:  Patient is 62 year old male from [facility], walks with RW, with PMH of ESRD on HD (TTS), BKA- L w/prosthetic leg, DM, Left eye blindness, CHF, CAD, HTN, HLD, osteoporosis, bronchitis, current smoker, and anemia who presents with complaint of missed dialysis. Last HD 7/22. Pt states he often missed HD sessions.  patient states he missed this HD session due to mild pain in left leg, patient remains able to ambulate. Pt complains of right leg swelling and states he does not make any urine. Patient states he was having mild shortness of breath.  Pt denies any fever, chills, N/V/D, constipation, CP, numbness or tingling (26 Jul 2023 19:20)    # [8/9] MEETING HELD WITH PATIENT, BROTHER ARTEMIO @ [phone] AND SW TEAM. PATIENT W/ HISTORY OF ROUTINE NONCOMPLIANCE W/ LIFE-SUSTAINING TREATMENT [HD], EVALUATIONS AND INTERVENTIONS - COUNSELLED AT LENGTH TO REMAIN COMPLIANT. DISCUSSED THAT PROGNOSIS IS POOR/GRIM GIVEN UNDERLYING MEDICAL CONDITIONS AND GIVEN NONCOMPLIANCE. HE VERBALIZED UNDERSTANDING. REGARDING GOC - PATIENT WISHES FOR FULL CODE. PALLIATIVE CARE CONSULTED. PSYCHIATRY CONSULTED. PATIENT EXPRESSED THAT HE DOES GROW IMPATIENT DURING DIALYSIS SESSIONS AND HAS BEEN LEAVING SESSIONS SOONER THAN PRESCRIBED. IN DISCUSSION THAT RISKS OF DEFERRING COMPLETE HD - INCLUDE BUT ARE NOT LIMITED TO WORSENING CLINICAL CONDITION, ELECTROLYTE ABNORMALITIES, ARRHYTHMIA AND DEATH. HE VERBALIZED UNDERSTANDING. D/W PATIENT THAT REPEATEDLY REFUSING INTERVENTIONS AND ASSESSMENTS IS NOT IN LINE WITH HIS WISHES TO IMPROVE. PATIENT VERBALIZED UNDERSTANDING. DISCUSSED REGARDING CURRENT CLINICAL CONDITION, RECOMMENDED EVALUATIONS AND INTERVENTIONS. ALL QUESTIONS ANSWERED. TEAM HAS OFFERED PATIENT EMOTIONAL SUPPORT AND OFFERED MEDICATION FOR MOOD. OFFERED TO PRE-MEDICATE W/ ANXIOLYTIC PRIOR TO HD. PATIENT IS AGREEABLE.     DISCUSSED THAT PATIENT WILL NEED TO COMPLY WITH HD SESSIONS IN ORDER TO BE MEDICALLY OPTIMIZED FOR DISCHARGE AND FOR SAFE D/C PLANNING. TEAM DISCUSSED OPTIONS OF RETURNING TO [facility] W/ OUTPATIENT HD , IF CARE NEEDS CAN BE SAFELY MET VS. NURSING HOME/[facility] . PATIENT AND BROTHER VERBALIZED UNDERSTANDING.     - SW AND MEDICAL TEAM HAVING ONGOING DISCUSSION WITH PATIENT REGARDING CONSISTENT COMPLIANCE IN ORDER TO RETURN TO HD IN THE OUTPATIENT SETTING  -- CONVEYED THAT PATIENT AND TEAM THAT PATIENT WILL NEED TO CONSISTENTLY COMPLETE FULL HD SESSIONS IN ORDER TO BE CONSIDERED BY OUTPATIENT HD AT NH W/ ONSITE HD OR OUTSIDE HD FACILITY    - PATIENT REQUIRING MORE FREQUENT WEEKLY HD SESSIONS    # FEVER X 1, SINCE AFEBRILE  # R/O INFECTION   # R/O COLITIS    - PATIENT REFUSING IV LINE PLACEMENT AND BLOOD WORK; RISKS OF DEFERRING DISCUSSED, PATIENT VERBALIZED UNDERSTANDING  - PLANNED FOR BLOOD CULTURE, CBC, CMP - WITH HEMODIALYSIS  - PLACED ON PO VANCOMYCIN  - ABD XRAY and CXRAY - W/OUT ACUTE FINDINGS  - ID CONSULT    # ACUTE ON CHRONIC HYPOXIC RESPIRATORY FAILURE S/T PULMONARY EDEMA - S/T INCOMPLETE HD SESSIONS ; ANASARCA  # HYPERKALEMIA  # HX OF COPD + S/P RECENT MULTIFOCAL PNEUMONIA ; R/O ASPIRATION PNEUMONIA  # PULMONARY HYPERTENSION  # UNCONTROLLED HTN  # ESRD ON HD TTS - W/ HX OF NONCOMPLIANCE    - TELEMETRY  - HYDRALAZINE, METOPROLOL AND NORVASC ; PRN IV ANTIHYPERTENSIVE  - LOKELMA  - SUPPLEMENTAL O2  - PLANNED FOR HD  - NEPHROLOGY CONSULT  - PULMONOLOGY CONSULT  - ID CONSULT    - CONTINUES TO REFUSE OR PRE-MATURELY DISCONTINUE SESSIONS OF HD       - [7/30] - OVERNIGHT RAPID RESPONSE S/T HYPOXIA - SELF-LIMITED - PATIENT IMPROVED S/P O2 SUPPLEMENT  - S/P CEFEPIME + FLAGYLL, BCX - NGTD      # RECURRENT INTRACTABLE NAUSEA/VOMITING, ? COFFEE GROUND EMESIS  # GASTROPARESIS  # HISTORY OF ESOPHAGITIS AND DUODENITIS [1/2021], HX OF GASTROPARESIS W/ EVIDENCE OF THICKENED ESOPHAGUS ON PREVIOUS CT SCAN   # PANCREAS W/ DOUBLE DUCT SIGN    - MONITORING HGB, PLACED ON PPI BID , CARAFATE QID  - PRN ANTIEMETICS  ; S/P DOSE OF REGLAN [WITH MINIMAL IMPROVEMENT]  - MONITORING FOR SYMPTOMS  - WHILE ON DIET PATIENT REPEATEDLY COUNSELLED TO CHEW FOOD CAUTIOUSLY AND CONSUME SMALL BITES W/ REGULAR CLEARING WITH WATER BETWEEN BITES    - ADVANCE DIET AS TOLERATED  - NOTED CT A/P    - S/P RECENT PRBC TRANSFUSION     - GI CONSULT  - SURGERY CONSULT    - [8/14] - EPISODE OF NAUSEA/VOMITING OVER THE WEEKEND - IMPROVED    # LESS LIKELY CHOLECYSTITIS  - S/P ABX  - NOTED CT A/P  - HIDA SCAN - NEGATIVE  - SURGERY CONSULT    # ELEVATED TROPONINS - ? DEMAND ISCHEMIA    - S/P TELEMETRY  - TREND TROPONINS  - ECHO - TRACE MR, MODERATELY INCREASED LV WALL THICKNESS, NORMAL LV SYSTOLIC FUNCTION, SEVERE PULMONARY HTN  - CARDIOLOGY CONSULT    # EPISODE OF HYPOGLYCEMIA - IMPROVED  # GASTROPARESIS  # UNDERLYING DM  - SSI + FS  - COUNSELLED TO COMPLY WITH HD TO REDUCE UREMIA    - REPEATEDLY COUNSELLED TO COMPLY WITH FINGERSTICKS      # DEPRESSION  - PLACED ON ZOLOFT  - PSYCHIATRY CONSULT    # PATIENT UNDERGOING OUTPATIENT WORKUP FOR CENTRAL VENOUS STENOSIS THROUGH NEPHROLOGY TEAM  - ? s/p STENT PLACEMENT    # ANEMIA OF CKD  # HX OF PANCYTOPENIA   - TREND HGB, TRANSFUSION THRESHOLD HGB < 7; PATIENT STILL INTERMITTENTLY REFUSING BLOODWORK, COUNSELLED TO COMPLY  - TYPE AND SCREEN  - ON EPO  - DENIES RECENT HEMATEMESIS, MELENA, HEMATOCHEZIA    - S/P RECENT PRBC TRANSFUSION  - S/P PRBC TRANSFUSION 7/29    - PPI BID, CARAFATE QID    # SEVERE PROTEIN CALORIE MALNUTRITION, FAILURE TO THRIVE   -  TEMPORAL WASTING, LOSS OF MUSCLE MASS FROM SHOULDER AND HIP GIRDLE  - NUTRITIONAL SUPPLEMENT    # HLD  # PARTIALLY BLIND  # S/P LEFT BKA  # HX OF PVD  # LS SPINAL STENOSIS  # GI AND DVT PPX

## 2023-08-23 NOTE — PHARMACOTHERAPY INTERVENTION NOTE - COMMENTS
Informed NP about the  ativan order per report and to reorder, if medically necessary.
New order for next 7 days

## 2023-08-23 NOTE — PROGRESS NOTE ADULT - ASSESSMENT
# ESRD. on HD TIW.  HD today.   As only tolerating less than prescribed time of dialysis, patient can have more frequent dialysis ( more than 3 times a week)  severe metabolic acidosis from ESRD.  UF with HD   D/W pt compliance with HD   pre-HD Ativan   # anemia of CKD. epogen on HD. Transfuse for HBG <7  #CKDMBD. cont sevelamer  # HTN. blood pressure at goal     D/C planning to nursing home with dialysis capabilities

## 2023-08-23 NOTE — PROGRESS NOTE ADULT - ASSESSMENT
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 Patient presented to ED with difficulty breathing, SOB, missed dialysis. Patient admitted to medicine for AHRF secondary to fluid overload from missed HD session. Hospital course complicated by hypoglycemia. Cardiology consulted recommending ECHO noted, normal LV fx severe pulm HTN. Keep patient net negative. Pulmonology recommending budesonide and albuterol PRN. Patient hospital course complicated by fever and hypotension, with loose stool. Patient refuse blood culture and IV placement. Patient allowed attempt eventually however difficult venous access and patient refuse further attempts. ID recommending starting vanco PO. Patient for LTC placement with HD facility.

## 2023-08-23 NOTE — PROGRESS NOTE ADULT - PROBLEM SELECTOR PLAN 5
- D/t ESRD vs. possible gastric ulcer  - 8/8 1U PRBC's ordered but not given  - C/w Procrit   - C/w Protonix & Carafate   - GI-Dr. Lee consulted-No planned intervention

## 2023-08-23 NOTE — PROGRESS NOTE ADULT - SUBJECTIVE AND OBJECTIVE BOX
NP Note discussed with  Primary Attending    Patient is a 62y old  Male who presents with a chief complaint of Missed dialysis (22 Aug 2023 18:43)      INTERVAL HPI/OVERNIGHT EVENTS: no new complaints, No loose stool     MEDICATIONS  (STANDING):  albuterol    90 MICROgram(s) HFA Inhaler 2 Puff(s) Inhalation every 6 hours  amLODIPine   Tablet 10 milliGRAM(s) Oral daily  aspirin enteric coated 81 milliGRAM(s) Oral daily  atorvastatin 40 milliGRAM(s) Oral at bedtime  budesonide 160 MICROgram(s)/formoterol 4.5 MICROgram(s) Inhaler 2 Puff(s) Inhalation two times a day  chlorhexidine 2% Cloths 1 Application(s) Topical daily  dextrose 5%. 1000 milliLiter(s) (50 mL/Hr) IV Continuous <Continuous>  dextrose 5%. 1000 milliLiter(s) (100 mL/Hr) IV Continuous <Continuous>  dextrose 50% Injectable 25 Gram(s) IV Push once  dextrose 50% Injectable 12.5 Gram(s) IV Push once  dextrose 50% Injectable 25 Gram(s) IV Push once  epoetin beverley (PROCRIT) Injectable 65820 Unit(s) IV Push <User Schedule>  epoetin beverley-epbx (RETACRIT) Injectable 82117 Unit(s) IV Push <User Schedule>  folic acid 1 milliGRAM(s) Oral daily  furosemide    Tablet 40 milliGRAM(s) Oral daily  glucagon  Injectable 1 milliGRAM(s) IntraMuscular once  hydrALAZINE 25 milliGRAM(s) Oral three times a day  lactobacillus acidophilus 1 Tablet(s) Oral two times a day with meals  latanoprost 0.005% Ophthalmic Solution 1 Drop(s) Both EYES at bedtime  levothyroxine 25 MICROGram(s) Oral daily  lidocaine   4% Patch 2 Patch Transdermal daily  LORazepam     Tablet 2 milliGRAM(s) Oral <User Schedule>  melatonin 3 milliGRAM(s) Oral at bedtime  metoprolol succinate ER 50 milliGRAM(s) Oral daily  pantoprazole    Tablet 40 milliGRAM(s) Oral before breakfast  sertraline 200 milliGRAM(s) Oral daily  sevelamer carbonate 800 milliGRAM(s) Oral three times a day with meals  sucralfate 1 Gram(s) Oral four times a day  vancomycin    Solution 125 milliGRAM(s) Oral every 6 hours    MEDICATIONS  (PRN):  acetaminophen     Tablet .. 1000 milliGRAM(s) Oral every 8 hours PRN Moderate Pain (4 - 6)  dextrose Oral Gel 15 Gram(s) Oral once PRN Blood Glucose LESS THAN 70 milliGRAM(s)/deciliter  ondansetron   Disintegrating Tablet 4 milliGRAM(s) Oral two times a day PRN Nausea and/or Vomiting  ondansetron Injectable 4 milliGRAM(s) IV Push every 6 hours PRN Nausea and/or Vomiting      __________________________________________________  REVIEW OF SYSTEMS:    CONSTITUTIONAL: No fever,   EYES: no acute visual disturbances  NECK: No pain or stiffness  RESPIRATORY: No cough; No shortness of breath  CARDIOVASCULAR: No chest pain, no palpitations  GASTROINTESTINAL: No pain. No nausea or vomiting; No diarrhea   NEUROLOGICAL: No headache or numbness, no tremors  MUSCULOSKELETAL: left LE throbbing, No joint pain, no muscle pain  GENITOURINARY: no dysuria, no frequency, no hesitancy  PSYCHIATRY: no depression , no anxiety  ALL OTHER  ROS negative        Vital Signs Last 24 Hrs  T(C): 36.4 (23 Aug 2023 04:45), Max: 37.2 (22 Aug 2023 13:00)  T(F): 97.6 (23 Aug 2023 04:45), Max: 99 (22 Aug 2023 13:00)  HR: 95 (23 Aug 2023 04:45) (91 - 95)  BP: 176/80 (23 Aug 2023 04:45) (132/64 - 176/80)  BP(mean): --  RR: 20 (23 Aug 2023 04:45) (18 - 20)  SpO2: 97% (23 Aug 2023 04:45) (97% - 100%)    Parameters below as of 23 Aug 2023 04:45  Patient On (Oxygen Delivery Method): room air        ________________________________________________  PHYSICAL EXAM:  GENERAL: NAD, irascible  HEENT: Normocephalic;  conjunctivae and sclerae clear; moist mucous membranes;   NECK : supple  CHEST/LUNG: Clear to auscultation bilaterally with good air entry   HEART: S1 S2  regular; no murmurs, gallops or rubs  ABDOMEN: Soft, Nontender, Nondistended; Bowel sounds present  EXTREMITIES: left BKA, right AVF, no cyanosis; no edema; no calf tenderness  SKIN: warm and dry; no rash  NERVOUS SYSTEM:  Awake and alert; Oriented  to place, person and time ; no new deficits    _________________________________________________  LABS:                        9.9    2.28  )-----------( 96       ( 21 Aug 2023 12:00 )             32.2     08-21    123<L>  |  97  |  58<H>  ----------------------------<  200<H>  5.6<H>   |  12<L>  |  12.00<H>    Ca    7.2<L>      21 Aug 2023 12:00  Phos  6.6     08-21  Mg     2.8     08-21    TPro  6.7  /  Alb  2.9<L>  /  TBili  0.4  /  DBili  x   /  AST  50<H>  /  ALT  73<H>  /  AlkPhos  134<H>  08-21      Urinalysis Basic - ( 21 Aug 2023 12:00 )    Color: x / Appearance: x / SG: x / pH: x  Gluc: 200 mg/dL / Ketone: x  / Bili: x / Urobili: x   Blood: x / Protein: x / Nitrite: x   Leuk Esterase: x / RBC: x / WBC x   Sq Epi: x / Non Sq Epi: x / Bacteria: x      CAPILLARY BLOOD GLUCOSE      POCT Blood Glucose.: 210 mg/dL (22 Aug 2023 21:15)  POCT Blood Glucose.: 210 mg/dL (22 Aug 2023 16:25)  POCT Blood Glucose.: 278 mg/dL (22 Aug 2023 12:03)        RADIOLOGY & ADDITIONAL TESTS:  < from: Xray Abdomen 1 View PORTABLE -Urgent (Xray Abdomen 1 View PORTABLE -Urgent .) (08.21.23 @ 22:36) >  ACC: [id number] EXAM:  XR ABDOMEN PORTABLE URGENT 1V   ORDERED BY: MARA CARTER     PROCEDURE DATE:  08/21/2023          INTERPRETATION:  KUB: AP    COMPARISON: None.    CLINICAL INFORMATION: Fever. Hypotension..    FINDINGS:    There is a nonspecific, nonobstructive bowel gas pattern.  No free intra-abdominal air.  No obvious intra-abdominal masses.  No abnormal calcifications.  The osseous structures are intact.    IMPRESSION:    No acute radiographic abdominal findings.    --- End of Report ---    < end of copied text >  < from: Xray Chest 1 View- PORTABLE-Urgent (Xray Chest 1 View- PORTABLE-Urgent .) (08.21.23 @ 22:35) >  ACC: [id number] EXAM:  XR CHEST PORTABLE URGENT 1V   ORDERED BY: MARA CARTER     PROCEDURE DATE:  08/21/2023          INTERPRETATION:  Portable chest radiograph    CLINICAL INFORMATION: Fever. Hypotension..    TECHNIQUE:  Portable  AP chest radiograph.    COMPARISON: None. .    FINDINGS:  CATHETERS AND TUBES: None    PULMONARY: The visualized lungs are clear of airspace consolidations or   pleural effusions.   No pneumothorax.    HEART/VASCULAR: The heart is mildly enlarged in transversediameter.    BONES: Visualized osseous thorax intact.    IMPRESSION:   No radiographic evidence of active chest disease.    --- End of Report ---    < end of copied text >    Imaging  Reviewed:  YES    Consultant(s) Notes Reviewed:   YES      Plan of care was discussed with patient and /or primary care giver; all questions and concerns were addressed

## 2023-08-23 NOTE — PROGRESS NOTE ADULT - PROBLEM SELECTOR PLAN 1
- Most likely Acute on chronic combined HF d/t missed HD resulting in volume overload & RLL HAP   - CT A/P noted for RLL PNA.  S/p HAP treatment.  Completed tx for HAP s/p Cefepime & Flagyl.    - SPO2 100% RA  - C/w PRN albuterol & Symbicort   - Nephro-Dr. Mosher,   - Cardiology-Dr. Still   - Pulmonology-Dr. Loredo   - ID-Dr. Newby

## 2023-08-23 NOTE — PROGRESS NOTE ADULT - SUBJECTIVE AND OBJECTIVE BOX
Time of Visit:  Patient seen and examined. pat is lying in bed     MEDICATIONS  (STANDING):  albuterol    90 MICROgram(s) HFA Inhaler 2 Puff(s) Inhalation every 6 hours  amLODIPine   Tablet 10 milliGRAM(s) Oral daily  aspirin enteric coated 81 milliGRAM(s) Oral daily  atorvastatin 40 milliGRAM(s) Oral at bedtime  budesonide 160 MICROgram(s)/formoterol 4.5 MICROgram(s) Inhaler 2 Puff(s) Inhalation two times a day  chlorhexidine 2% Cloths 1 Application(s) Topical daily  dextrose 5%. 1000 milliLiter(s) (50 mL/Hr) IV Continuous <Continuous>  dextrose 5%. 1000 milliLiter(s) (100 mL/Hr) IV Continuous <Continuous>  dextrose 50% Injectable 25 Gram(s) IV Push once  dextrose 50% Injectable 12.5 Gram(s) IV Push once  dextrose 50% Injectable 25 Gram(s) IV Push once  epoetin beverley (PROCRIT) Injectable 06927 Unit(s) IV Push <User Schedule>  epoetin beverley-epbx (RETACRIT) Injectable 05152 Unit(s) IV Push <User Schedule>  folic acid 1 milliGRAM(s) Oral daily  furosemide    Tablet 40 milliGRAM(s) Oral daily  glucagon  Injectable 1 milliGRAM(s) IntraMuscular once  hydrALAZINE 25 milliGRAM(s) Oral three times a day  lactobacillus acidophilus 1 Tablet(s) Oral two times a day with meals  latanoprost 0.005% Ophthalmic Solution 1 Drop(s) Both EYES at bedtime  levothyroxine 25 MICROGram(s) Oral daily  lidocaine   4% Patch 2 Patch Transdermal daily  melatonin 3 milliGRAM(s) Oral at bedtime  metoprolol succinate ER 50 milliGRAM(s) Oral daily  pantoprazole    Tablet 40 milliGRAM(s) Oral before breakfast  sertraline 200 milliGRAM(s) Oral daily  sevelamer carbonate 800 milliGRAM(s) Oral three times a day with meals  sucralfate 1 Gram(s) Oral four times a day  vancomycin    Solution 125 milliGRAM(s) Oral every 6 hours      MEDICATIONS  (PRN):  acetaminophen     Tablet .. 1000 milliGRAM(s) Oral every 8 hours PRN Moderate Pain (4 - 6)  dextrose Oral Gel 15 Gram(s) Oral once PRN Blood Glucose LESS THAN 70 milliGRAM(s)/deciliter  ondansetron   Disintegrating Tablet 4 milliGRAM(s) Oral two times a day PRN Nausea and/or Vomiting  ondansetron Injectable 4 milliGRAM(s) IV Push every 6 hours PRN Nausea and/or Vomiting       Medications up to date at time of exam.      PHYSICAL EXAMINATION:  Patient has no new complaints.  GENERAL: The patient is a well-developed, well-nourished, in no apparent distress.     Vital Signs Last 24 Hrs  T(C): 37 (23 Aug 2023 15:57), Max: 37 (23 Aug 2023 15:57)  T(F): 98.6 (23 Aug 2023 15:57), Max: 98.6 (23 Aug 2023 15:57)  HR: 89 (23 Aug 2023 15:57) (78 - 95)  BP: 137/67 (23 Aug 2023 15:57) (137/67 - 176/80)  BP(mean): --  RR: 16 (23 Aug 2023 15:57) (16 - 20)  SpO2: 100% (23 Aug 2023 15:57) (94% - 100%)    Parameters below as of 23 Aug 2023 15:57  Patient On (Oxygen Delivery Method): room air       (if applicable)    Chest Tube (if applicable)    HEENT: Head is normocephalic and atraumatic. Extraocular muscles are intact. Mucous membranes are moist.     NECK: Supple, no palpable adenopathy.    LUNGS: Clear to auscultation, no wheezing, rales, or rhonchi.    HEART: Regular rate and rhythm without murmur.    ABDOMEN: Soft, nontender, and nondistended.  No hepatosplenomegaly is noted.    : No painful voiding, no pelvic pain    EXTREMITIES: Without any cyanosis, clubbing, rash, lesions or edema.    NEUROLOGIC: Awake,     SKIN: Warm, dry, good turgor.      LABS:                        10.1   2.86  )-----------( 85       ( 23 Aug 2023 10:30 )             31.9     08-23    122<L>  |  94<L>  |  57<H>  ----------------------------<  182<H>  5.4<H>   |  17<L>  |  10.70<H>    Ca    7.9<L>      23 Aug 2023 10:30  Phos  5.6     08-23  Mg     2.9     08-23    TPro  6.8  /  Alb  3.0<L>  /  TBili  0.4  /  DBili  x   /  AST  27  /  ALT  54  /  AlkPhos  103  08-23      Urinalysis Basic - ( 23 Aug 2023 10:30 )    Color: x / Appearance: x / SG: x / pH: x  Gluc: 182 mg/dL / Ketone: x  / Bili: x / Urobili: x   Blood: x / Protein: x / Nitrite: x   Leuk Esterase: x / RBC: x / WBC x   Sq Epi: x / Non Sq Epi: x / Bacteria: x                      MICROBIOLOGY: (if applicable)    RADIOLOGY & ADDITIONAL STUDIES:  EKG:   CXR:  ECHO:    IMPRESSION: 62y Male 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      Osteoarthritis      Vision loss of left eye  blind      ESRD on hemodialysis  T/Th/S      Cataract  both eyes - hx of sx done      BPH (benign prostatic hyperplasia)      UTI (urinary tract infection)  hx of      Bladder mass  hx of      H/O hematuria      Osteoporosis      Vision loss of right eye  decreased      Depression      Chronic GERD      Osteomyelitis of vertebra      CHF (congestive heart failure)      Below knee amputation status, left  2012- pt is wearing prostesis      History of right cataract extraction      History of left cataract extraction      S/P arteriovenous (AV) fistula creation  right arm brachiocephalic arteriovenous fistula on 11/08/2018      H/O hematuria  s/p bladder bx and fulguration 2/25/2020      H/O transurethral destruction of bladder lesion  2020      History of excision of mass  back mass on 03/31/2021       p/w         Impression: This is a 62 yr old man  from [facility] with ESRD on HD ( T-Th-Sat ). Current smoker . Presented to ED due to Missed Dialysis, last HD 07-22-23 . Per Patient stated that he often missed HD sessions due to Mild left leg pain and right leg swelling . S/p RRT for SOB with Hypoxia due to Acute Hypoxic Respiratory Failure secondary to Pulmonary edema/ Fluid overload due to Missed Dialysis . CT Abdomen with Small Bilateral Pleural Effusions, Rt Lower Lung with groundglass opacity. No bowel obstruction. Small Gall Stone.   Vomiting due to diabetic gastroparesis vs acute cholecystitis       Suggestions:    - Neph suggest premedicate with ativan before HD   - Limit fluid intake   - Continue O2 Supp as needed to maintain sat >90%   - Continue Duoneb via nebulization Q 6 Hours .   - Reinforced the importance of compliance to Dialysis schedule.   - Aspiration precaution with HOB elevation especially during meal times.           Time of Visit:  Patient seen and examined. pat is lying in bed     MEDICATIONS  (STANDING):  albuterol    90 MICROgram(s) HFA Inhaler 2 Puff(s) Inhalation every 6 hours  amLODIPine   Tablet 10 milliGRAM(s) Oral daily  aspirin enteric coated 81 milliGRAM(s) Oral daily  atorvastatin 40 milliGRAM(s) Oral at bedtime  budesonide 160 MICROgram(s)/formoterol 4.5 MICROgram(s) Inhaler 2 Puff(s) Inhalation two times a day  chlorhexidine 2% Cloths 1 Application(s) Topical daily  dextrose 5%. 1000 milliLiter(s) (50 mL/Hr) IV Continuous <Continuous>  dextrose 5%. 1000 milliLiter(s) (100 mL/Hr) IV Continuous <Continuous>  dextrose 50% Injectable 25 Gram(s) IV Push once  dextrose 50% Injectable 12.5 Gram(s) IV Push once  dextrose 50% Injectable 25 Gram(s) IV Push once  epoetin beverley (PROCRIT) Injectable 75304 Unit(s) IV Push <User Schedule>  epoetin beverley-epbx (RETACRIT) Injectable 00701 Unit(s) IV Push <User Schedule>  folic acid 1 milliGRAM(s) Oral daily  furosemide    Tablet 40 milliGRAM(s) Oral daily  glucagon  Injectable 1 milliGRAM(s) IntraMuscular once  hydrALAZINE 25 milliGRAM(s) Oral three times a day  lactobacillus acidophilus 1 Tablet(s) Oral two times a day with meals  latanoprost 0.005% Ophthalmic Solution 1 Drop(s) Both EYES at bedtime  levothyroxine 25 MICROGram(s) Oral daily  lidocaine   4% Patch 2 Patch Transdermal daily  melatonin 3 milliGRAM(s) Oral at bedtime  metoprolol succinate ER 50 milliGRAM(s) Oral daily  pantoprazole    Tablet 40 milliGRAM(s) Oral before breakfast  sertraline 200 milliGRAM(s) Oral daily  sevelamer carbonate 800 milliGRAM(s) Oral three times a day with meals  sucralfate 1 Gram(s) Oral four times a day  vancomycin    Solution 125 milliGRAM(s) Oral every 6 hours      MEDICATIONS  (PRN):  acetaminophen     Tablet .. 1000 milliGRAM(s) Oral every 8 hours PRN Moderate Pain (4 - 6)  dextrose Oral Gel 15 Gram(s) Oral once PRN Blood Glucose LESS THAN 70 milliGRAM(s)/deciliter  ondansetron   Disintegrating Tablet 4 milliGRAM(s) Oral two times a day PRN Nausea and/or Vomiting  ondansetron Injectable 4 milliGRAM(s) IV Push every 6 hours PRN Nausea and/or Vomiting       Medications up to date at time of exam.      PHYSICAL EXAMINATION:  Patient has no new complaints.  GENERAL: The patient is a well-developed, well-nourished, in no apparent distress.     Vital Signs Last 24 Hrs  T(C): 37 (23 Aug 2023 15:57), Max: 37 (23 Aug 2023 15:57)  T(F): 98.6 (23 Aug 2023 15:57), Max: 98.6 (23 Aug 2023 15:57)  HR: 89 (23 Aug 2023 15:57) (78 - 95)  BP: 137/67 (23 Aug 2023 15:57) (137/67 - 176/80)  BP(mean): --  RR: 16 (23 Aug 2023 15:57) (16 - 20)  SpO2: 100% (23 Aug 2023 15:57) (94% - 100%)    Parameters below as of 23 Aug 2023 15:57  Patient On (Oxygen Delivery Method): room air       (if applicable)    Chest Tube (if applicable)    HEENT: Head is normocephalic and atraumatic. Extraocular muscles are intact. Mucous membranes are moist.     NECK: Supple, no palpable adenopathy.    LUNGS: Clear to auscultation, no wheezing, rales, or rhonchi.    HEART: Regular rate and rhythm without murmur.    ABDOMEN: Soft, nontender, and nondistended.  No hepatosplenomegaly is noted.    : No painful voiding, no pelvic pain    EXTREMITIES: Without any cyanosis, clubbing, rash, lesions or edema.    NEUROLOGIC: Awake,     SKIN: Warm, dry, good turgor.      LABS:                        10.1   2.86  )-----------( 85       ( 23 Aug 2023 10:30 )             31.9     08-23    122<L>  |  94<L>  |  57<H>  ----------------------------<  182<H>  5.4<H>   |  17<L>  |  10.70<H>    Ca    7.9<L>      23 Aug 2023 10:30  Phos  5.6     08-23  Mg     2.9     08-23    TPro  6.8  /  Alb  3.0<L>  /  TBili  0.4  /  DBili  x   /  AST  27  /  ALT  54  /  AlkPhos  103  08-23      Urinalysis Basic - ( 23 Aug 2023 10:30 )    Color: x / Appearance: x / SG: x / pH: x  Gluc: 182 mg/dL / Ketone: x  / Bili: x / Urobili: x   Blood: x / Protein: x / Nitrite: x   Leuk Esterase: x / RBC: x / WBC x   Sq Epi: x / Non Sq Epi: x / Bacteria: x                      MICROBIOLOGY: (if applicable)    RADIOLOGY & ADDITIONAL STUDIES:  EKG:   CXR:  ECHO:    IMPRESSION: 62y Male 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      Osteoarthritis      Vision loss of left eye  blind      ESRD on hemodialysis  T/Th/S      Cataract  both eyes - hx of sx done      BPH (benign prostatic hyperplasia)      UTI (urinary tract infection)  hx of      Bladder mass  hx of      H/O hematuria      Osteoporosis      Vision loss of right eye  decreased      Depression      Chronic GERD      Osteomyelitis of vertebra      CHF (congestive heart failure)      Below knee amputation status, left  2012- pt is wearing prostesis      History of right cataract extraction      History of left cataract extraction      S/P arteriovenous (AV) fistula creation  right arm brachiocephalic arteriovenous fistula on 11/08/2018      H/O hematuria  s/p bladder bx and fulguration 2/25/2020      H/O transurethral destruction of bladder lesion  2020      History of excision of mass  back mass on 03/31/2021       p/w         Impression: This is a 62 yr old man  from [facility] with ESRD on HD ( T-Th-Sat ). Current smoker . Presented to ED due to Missed Dialysis, last HD 07-22-23 . Per Patient stated that he often missed HD sessions due to Mild left leg pain and right leg swelling . S/p RRT for SOB with Hypoxia due to Acute Hypoxic Respiratory Failure secondary to Pulmonary edema/ Fluid overload due to Missed Dialysis . CT Abdomen with Small Bilateral Pleural Effusions, Rt Lower Lung with groundglass opacity. No bowel obstruction. Small Gall Stone.   Vomiting due to diabetic gastroparesis vs acute cholecystitis       Suggestions:  - Correct hyponatremia   - Neph suggest premedicate with ativan before HD   - Limit fluid intake   - Continue O2 Supp as needed to maintain sat >90%   - Continue Duoneb via nebulization Q 6 Hours .   - Reinforced the importance of compliance to Dialysis schedule.   - Aspiration precaution with HOB elevation especially during meal times.

## 2023-08-23 NOTE — PROGRESS NOTE ADULT - PROBLEM SELECTOR PLAN 2
- Echo from 2/2023 shows EF 45-50%, and grade 1 DD. Compared with echocardiogram report of 2/24/2023, LVEF, improved on current study.  - C/w Lasix  - Cardiology-Dr. Still   - Pulmonology-Dr. Loredo

## 2023-08-24 NOTE — PROGRESS NOTE ADULT - SUBJECTIVE AND OBJECTIVE BOX
Time of Visit:  Patient seen and examined.     MEDICATIONS  (STANDING):  albuterol    90 MICROgram(s) HFA Inhaler 2 Puff(s) Inhalation every 6 hours  amLODIPine   Tablet 10 milliGRAM(s) Oral daily  aspirin enteric coated 81 milliGRAM(s) Oral daily  atorvastatin 40 milliGRAM(s) Oral at bedtime  budesonide 160 MICROgram(s)/formoterol 4.5 MICROgram(s) Inhaler 2 Puff(s) Inhalation two times a day  chlorhexidine 2% Cloths 1 Application(s) Topical daily  dextrose 5%. 1000 milliLiter(s) (50 mL/Hr) IV Continuous <Continuous>  dextrose 5%. 1000 milliLiter(s) (100 mL/Hr) IV Continuous <Continuous>  dextrose 50% Injectable 25 Gram(s) IV Push once  dextrose 50% Injectable 12.5 Gram(s) IV Push once  dextrose 50% Injectable 25 Gram(s) IV Push once  epoetin beverley (PROCRIT) Injectable 74781 Unit(s) IV Push <User Schedule>  epoetin beverley-epbx (RETACRIT) Injectable 12062 Unit(s) IV Push <User Schedule>  folic acid 1 milliGRAM(s) Oral daily  furosemide    Tablet 40 milliGRAM(s) Oral daily  glucagon  Injectable 1 milliGRAM(s) IntraMuscular once  hydrALAZINE 25 milliGRAM(s) Oral three times a day  lactobacillus acidophilus 1 Tablet(s) Oral two times a day with meals  latanoprost 0.005% Ophthalmic Solution 1 Drop(s) Both EYES at bedtime  levothyroxine 25 MICROGram(s) Oral daily  lidocaine   4% Patch 2 Patch Transdermal daily  melatonin 3 milliGRAM(s) Oral at bedtime  metoprolol succinate ER 50 milliGRAM(s) Oral daily  pantoprazole    Tablet 40 milliGRAM(s) Oral before breakfast  sertraline 200 milliGRAM(s) Oral daily  sevelamer carbonate 800 milliGRAM(s) Oral three times a day with meals  sucralfate 1 Gram(s) Oral four times a day  vancomycin    Solution 125 milliGRAM(s) Oral every 6 hours      MEDICATIONS  (PRN):  acetaminophen     Tablet .. 1000 milliGRAM(s) Oral every 8 hours PRN Moderate Pain (4 - 6)  dextrose Oral Gel 15 Gram(s) Oral once PRN Blood Glucose LESS THAN 70 milliGRAM(s)/deciliter  ondansetron   Disintegrating Tablet 4 milliGRAM(s) Oral two times a day PRN Nausea and/or Vomiting  ondansetron Injectable 4 milliGRAM(s) IV Push every 6 hours PRN Nausea and/or Vomiting       Medications up to date at time of exam.    ROS; No fever, chills, cough, congestion on exam. Denies SOB.   PHYSICAL EXAMINATION:    Vital Signs Last 24 Hrs  T(C): 37.2 (24 Aug 2023 05:28), Max: 37.2 (24 Aug 2023 05:28)  T(F): 99 (24 Aug 2023 05:28), Max: 99 (24 Aug 2023 05:28)  HR: 88 (24 Aug 2023 05:28) (88 - 92)  BP: 165/89 (24 Aug 2023 05:28) (121/58 - 165/89)  BP(mean): 73 (23 Aug 2023 20:12) (73 - 73)  RR: 18 (24 Aug 2023 05:28) (16 - 18)  SpO2: 99% (24 Aug 2023 05:28) (94% - 100%)    Parameters below as of 24 Aug 2023 05:28  Patient On (Oxygen Delivery Method): room air       (if applicable)    General : Alert and oriented . Able to answer question with no SOB. No acute distress .     HEENT: Head is normocephalic and atraumatic. No nasal tenderness.  Mucous membranes are moist.     NECK: Supple, no palpable adenopathy.    LUNGS: Clear to auscultation bilaterally with no wheezing, rales, or rhonchi. No use of accessory muscle.     HEART: S1 S2 Regular rate and no click / rub.     ABDOMEN: Soft, nontender, and nondistended. Active bowel sounds.     EXTREMITIES: Left BKA , using rolling walker OOB .     NEUROLOGIC: Awake, alert, oriented.     SKIN: Warm and moist . Non diaphoretic.       LABS:                        10.1   2.86  )-----------( 85       ( 23 Aug 2023 10:30 )             31.9     08-23    122<L>  |  94<L>  |  57<H>  ----------------------------<  182<H>  5.4<H>   |  17<L>  |  10.70<H>    Ca    7.9<L>      23 Aug 2023 10:30  Phos  5.6     08-23  Mg     2.9     08-23    TPro  6.8  /  Alb  3.0<L>  /  TBili  0.4  /  DBili  x   /  AST  27  /  ALT  54  /  AlkPhos  103  08-23      Urinalysis Basic - ( 23 Aug 2023 10:30 )    Color: x / Appearance: x / SG: x / pH: x  Gluc: 182 mg/dL / Ketone: x  / Bili: x / Urobili: x   Blood: x / Protein: x / Nitrite: x   Leuk Esterase: x / RBC: x / WBC x   Sq Epi: x / Non Sq Epi: x / Bacteria: x                      MICROBIOLOGY: (if applicable)    RADIOLOGY & ADDITIONAL STUDIES:  EKG:   CXR:  ECHO:    IMPRESSION: 62y Male 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      Osteoarthritis      Vision loss of left eye  blind      ESRD on hemodialysis  T/Th/S      Cataract  both eyes - hx of sx done      BPH (benign prostatic hyperplasia)      UTI (urinary tract infection)  hx of      Bladder mass  hx of      H/O hematuria      Osteoporosis      Vision loss of right eye  decreased      Depression      Chronic GERD      Osteomyelitis of vertebra      CHF (congestive heart failure)      Below knee amputation status, left  2012- pt is wearing prostesis      History of right cataract extraction      History of left cataract extraction      S/P arteriovenous (AV) fistula creation  right arm brachiocephalic arteriovenous fistula on 11/08/2018      H/O hematuria  s/p bladder bx and fulguration 2/25/2020      H/O transurethral destruction of bladder lesion  2020      History of excision of mass  back mass on 03/31/2021       Impression: This is a 62 yr old man  from [facility] with ESRD on HD ( T-Th-Sat ). Current smoker . Presented to ED due to Missed Dialysis, last HD 07-22-23 . Per Patient stated that he often missed HD sessions due to Mild left leg pain and right leg swelling . S/p RRT for SOB with Hypoxia due to Acute Hypoxic Respiratory Failure secondary to Pulmonary edema/ Fluid overload due to Missed Dialysis . CT Abdomen with Small Bilateral Pleural Effusions, Rt Lower Lung with groundglass opacity. No bowel obstruction. Small Gall Stone.   Vomiting due to diabetic gastroparesis vs acute cholecystitis . Had a HAP with s/p Cefepime and Flagyl .       Suggestions:  O2 saturation 98 % room air. So far been saturating good room air.    Neph suggest premedicate with ativan before HD .   Limit fluid intake .  Continue Albuterol 90 mcg 2 puffs Q 6 Hours.   Continue Symbicort 160-4.5 mcg 2 Puffs Twice Daily .    Reinforced the importance of compliance to Dialysis schedule.  69

## 2023-08-24 NOTE — PROGRESS NOTE ADULT - SUBJECTIVE AND OBJECTIVE BOX
NP Note discussed with  primary attending    Patient is a 62y old  Male who presents with a chief complaint of Missed dialysis (24 Aug 2023 13:03)      INTERVAL HPI/OVERNIGHT EVENTS: no new complaints    MEDICATIONS  (STANDING):  albuterol    90 MICROgram(s) HFA Inhaler 2 Puff(s) Inhalation every 6 hours  amLODIPine   Tablet 10 milliGRAM(s) Oral daily  aspirin enteric coated 81 milliGRAM(s) Oral daily  atorvastatin 40 milliGRAM(s) Oral at bedtime  budesonide 160 MICROgram(s)/formoterol 4.5 MICROgram(s) Inhaler 2 Puff(s) Inhalation two times a day  chlorhexidine 2% Cloths 1 Application(s) Topical daily  dextrose 5%. 1000 milliLiter(s) (50 mL/Hr) IV Continuous <Continuous>  dextrose 5%. 1000 milliLiter(s) (100 mL/Hr) IV Continuous <Continuous>  dextrose 50% Injectable 25 Gram(s) IV Push once  dextrose 50% Injectable 12.5 Gram(s) IV Push once  dextrose 50% Injectable 25 Gram(s) IV Push once  epoetin beverley (PROCRIT) Injectable 91860 Unit(s) IV Push <User Schedule>  epoetin beverley-epbx (RETACRIT) Injectable 68594 Unit(s) IV Push <User Schedule>  folic acid 1 milliGRAM(s) Oral daily  furosemide    Tablet 40 milliGRAM(s) Oral daily  glucagon  Injectable 1 milliGRAM(s) IntraMuscular once  hydrALAZINE 25 milliGRAM(s) Oral three times a day  lactobacillus acidophilus 1 Tablet(s) Oral two times a day with meals  latanoprost 0.005% Ophthalmic Solution 1 Drop(s) Both EYES at bedtime  levothyroxine 25 MICROGram(s) Oral daily  lidocaine   4% Patch 2 Patch Transdermal daily  melatonin 3 milliGRAM(s) Oral at bedtime  metoprolol succinate ER 50 milliGRAM(s) Oral daily  pantoprazole    Tablet 40 milliGRAM(s) Oral before breakfast  sertraline 200 milliGRAM(s) Oral daily  sevelamer carbonate 800 milliGRAM(s) Oral three times a day with meals  sucralfate 1 Gram(s) Oral four times a day  vancomycin    Solution 125 milliGRAM(s) Oral every 6 hours    MEDICATIONS  (PRN):  acetaminophen     Tablet .. 1000 milliGRAM(s) Oral every 8 hours PRN Moderate Pain (4 - 6)  dextrose Oral Gel 15 Gram(s) Oral once PRN Blood Glucose LESS THAN 70 milliGRAM(s)/deciliter  ondansetron   Disintegrating Tablet 4 milliGRAM(s) Oral two times a day PRN Nausea and/or Vomiting  ondansetron Injectable 4 milliGRAM(s) IV Push every 6 hours PRN Nausea and/or Vomiting      __________________________________________________  REVIEW OF SYSTEMS:    CONSTITUTIONAL: No fever,   RESPIRATORY: No cough; No shortness of breath  CARDIOVASCULAR: No chest pain, no palpitations  GASTROINTESTINAL: No pain. No nausea or vomiting; No diarrhea   NEUROLOGICAL: No headache or numbness, no tremors  MUSCULOSKELETAL: intermittent back pain   GENITOURINARY: no dysuria,         Vital Signs Last 24 Hrs  T(C): 36.4 (24 Aug 2023 14:30), Max: 37.2 (24 Aug 2023 05:28)  T(F): 97.5 (24 Aug 2023 14:30), Max: 99 (24 Aug 2023 05:28)  HR: 78 (24 Aug 2023 14:30) (78 - 92)  BP: 126/80 (24 Aug 2023 14:30) (121/58 - 165/89)  BP(mean): 73 (23 Aug 2023 20:12) (73 - 73)  RR: 18 (24 Aug 2023 14:30) (16 - 18)  SpO2: 100% (24 Aug 2023 14:30) (94% - 100%)    Parameters below as of 24 Aug 2023 14:30  Patient On (Oxygen Delivery Method): room air        ________________________________________________  PHYSICAL EXAM:  GENERAL: NAD  CHEST/LUNG: Clear to ausculitation bilaterally   HEART: S1 S2  regular; no murmurs, gallops or rubs  ABDOMEN: Soft, Nontender, Nondistended; Bowel sounds present  EXTREMITIES: no cyanosis; no edema; no calf tenderness  Left BKA   SKIN: warm and dry; no rash  NERVOUS SYSTEM:  Awake and alert; Oriented  to place, person and disoriented to time ; no new deficits    _________________________________________________  LABS:                        10.1   2.86  )-----------( 85       ( 23 Aug 2023 10:30 )             31.9     08-23    122<L>  |  94<L>  |  57<H>  ----------------------------<  182<H>  5.4<H>   |  17<L>  |  10.70<H>    Ca    7.9<L>      23 Aug 2023 10:30  Phos  5.6     08-23  Mg     2.9     08-23    TPro  6.8  /  Alb  3.0<L>  /  TBili  0.4  /  DBili  x   /  AST  27  /  ALT  54  /  AlkPhos  103  08-23      Urinalysis Basic - ( 23 Aug 2023 10:30 )    Color: x / Appearance: x / SG: x / pH: x  Gluc: 182 mg/dL / Ketone: x  / Bili: x / Urobili: x   Blood: x / Protein: x / Nitrite: x   Leuk Esterase: x / RBC: x / WBC x   Sq Epi: x / Non Sq Epi: x / Bacteria: x      CAPILLARY BLOOD GLUCOSE      POCT Blood Glucose.: 233 mg/dL (24 Aug 2023 11:34)  POCT Blood Glucose.: 204 mg/dL (24 Aug 2023 07:43)  POCT Blood Glucose.: 214 mg/dL (23 Aug 2023 16:50)        RADIOLOGY & ADDITIONAL TESTS:    Imaging Personally Reviewed:  YES/NO    Consultant(s) Notes Reviewed:   YES/ No    Care Discussed with Consultants :     Plan of care was discussed with patient and /or primary care giver; all questions and concerns were addressed and care was aligned with patient's wishes.

## 2023-08-24 NOTE — PROGRESS NOTE ADULT - ASSESSMENT
# ESRD. on HD TIW.  HD in AM  As only tolerating less than prescribed time of dialysis, patient can have more frequent dialysis ( more than 3 times a week)   metabolic acidosis from ESRD.  UF with HD   D/W pt compliance with HD   pre-HD Ativan   # anemia of CKD. epogen on HD. Transfuse for HBG <7  #CKDMBD. cont sevelamer  # HTN. blood pressure at goal     D/C planning to nursing home with dialysis capabilities

## 2023-08-24 NOTE — PROGRESS NOTE ADULT - SUBJECTIVE AND OBJECTIVE BOX
Patient is a 62y old  Male who presents with a chief complaint of Missed dialysis (23 Aug 2023 18:30)    PATIENT IS SEEN AND EXAMINED IN MEDICAL FLOOR.  SULTANA [    ]    JEREMY [   ]      GT [   ]    ALLERGIES:  No Known Drug Allergies  fish (Rash)  liver (Anaphylaxis)      Daily     Daily     VITALS:    Vital Signs Last 24 Hrs  T(C): 37.2 (24 Aug 2023 05:28), Max: 37.2 (24 Aug 2023 05:28)  T(F): 99 (24 Aug 2023 05:28), Max: 99 (24 Aug 2023 05:28)  HR: 88 (24 Aug 2023 05:28) (78 - 92)  BP: 165/89 (24 Aug 2023 05:28) (121/58 - 165/89)  BP(mean): 73 (23 Aug 2023 20:12) (73 - 73)  RR: 18 (24 Aug 2023 05:28) (16 - 18)  SpO2: 99% (24 Aug 2023 05:28) (94% - 100%)    Parameters below as of 24 Aug 2023 05:28  Patient On (Oxygen Delivery Method): room air        LABS:    CBC Full  -  ( 23 Aug 2023 10:30 )  WBC Count : 2.86 K/uL  RBC Count : 3.64 M/uL  Hemoglobin : 10.1 g/dL  Hematocrit : 31.9 %  Platelet Count - Automated : 85 K/uL  Mean Cell Volume : 87.6 fl  Mean Cell Hemoglobin : 27.7 pg  Mean Cell Hemoglobin Concentration : 31.7 gm/dL  Auto Neutrophil # : x  Auto Lymphocyte # : x  Auto Monocyte # : x  Auto Eosinophil # : x  Auto Basophil # : x  Auto Neutrophil % : x  Auto Lymphocyte % : x  Auto Monocyte % : x  Auto Eosinophil % : x  Auto Basophil % : x      08-23    122<L>  |  94<L>  |  57<H>  ----------------------------<  182<H>  5.4<H>   |  17<L>  |  10.70<H>    Ca    7.9<L>      23 Aug 2023 10:30  Phos  5.6     08-23  Mg     2.9     08-23    TPro  6.8  /  Alb  3.0<L>  /  TBili  0.4  /  DBili  x   /  AST  27  /  ALT  54  /  AlkPhos  103  08-23    CAPILLARY BLOOD GLUCOSE      POCT Blood Glucose.: 204 mg/dL (24 Aug 2023 07:43)  POCT Blood Glucose.: 214 mg/dL (23 Aug 2023 16:50)        LIVER FUNCTIONS - ( 23 Aug 2023 10:30 )  Alb: 3.0 g/dL / Pro: 6.8 g/dL / ALK PHOS: 103 U/L / ALT: 54 U/L DA / AST: 27 U/L / GGT: x           Creatinine Trend: 10.70<--, 12.00<--, 12.30<--, 10.70<--, 11.70<--, 12.90<--  I&O's Summary    23 Aug 2023 07:01  -  24 Aug 2023 07:00  --------------------------------------------------------  IN: 0 mL / OUT: 4 mL / NET: -4 mL            .Blood Blood-Peripheral  05-28 @ 14:07   No Growth Final  --  --          MEDICATIONS:    MEDICATIONS  (STANDING):  albuterol    90 MICROgram(s) HFA Inhaler 2 Puff(s) Inhalation every 6 hours  amLODIPine   Tablet 10 milliGRAM(s) Oral daily  aspirin enteric coated 81 milliGRAM(s) Oral daily  atorvastatin 40 milliGRAM(s) Oral at bedtime  budesonide 160 MICROgram(s)/formoterol 4.5 MICROgram(s) Inhaler 2 Puff(s) Inhalation two times a day  chlorhexidine 2% Cloths 1 Application(s) Topical daily  dextrose 5%. 1000 milliLiter(s) (50 mL/Hr) IV Continuous <Continuous>  dextrose 5%. 1000 milliLiter(s) (100 mL/Hr) IV Continuous <Continuous>  dextrose 50% Injectable 25 Gram(s) IV Push once  dextrose 50% Injectable 12.5 Gram(s) IV Push once  dextrose 50% Injectable 25 Gram(s) IV Push once  epoetin beverley (PROCRIT) Injectable 52404 Unit(s) IV Push <User Schedule>  epoetin beverley-epbx (RETACRIT) Injectable 39562 Unit(s) IV Push <User Schedule>  folic acid 1 milliGRAM(s) Oral daily  furosemide    Tablet 40 milliGRAM(s) Oral daily  glucagon  Injectable 1 milliGRAM(s) IntraMuscular once  hydrALAZINE 25 milliGRAM(s) Oral three times a day  lactobacillus acidophilus 1 Tablet(s) Oral two times a day with meals  latanoprost 0.005% Ophthalmic Solution 1 Drop(s) Both EYES at bedtime  levothyroxine 25 MICROGram(s) Oral daily  lidocaine   4% Patch 2 Patch Transdermal daily  melatonin 3 milliGRAM(s) Oral at bedtime  metoprolol succinate ER 50 milliGRAM(s) Oral daily  pantoprazole    Tablet 40 milliGRAM(s) Oral before breakfast  sertraline 200 milliGRAM(s) Oral daily  sevelamer carbonate 800 milliGRAM(s) Oral three times a day with meals  sucralfate 1 Gram(s) Oral four times a day  vancomycin    Solution 125 milliGRAM(s) Oral every 6 hours      MEDICATIONS  (PRN):  acetaminophen     Tablet .. 1000 milliGRAM(s) Oral every 8 hours PRN Moderate Pain (4 - 6)  dextrose Oral Gel 15 Gram(s) Oral once PRN Blood Glucose LESS THAN 70 milliGRAM(s)/deciliter  ondansetron   Disintegrating Tablet 4 milliGRAM(s) Oral two times a day PRN Nausea and/or Vomiting  ondansetron Injectable 4 milliGRAM(s) IV Push every 6 hours PRN Nausea and/or Vomiting      REVIEW OF SYSTEMS:                           ALL ROS DONE [ X   ]    CONSTITUTIONAL:  LETHARGIC [   ], FEVER [   ], UNRESPONSIVE [   ]  CVS:  CP  [   ], SOB, [   ], PALPITATIONS [   ], DIZZYNESS [   ]  RS: COUGH [   ], SPUTUM [   ]  GI: ABDOMINAL PAIN [   ], NAUSEA [   ], VOMITINGS [   ], DIARRHEA [   ], CONSTIPATION [   ]  :  DYSURIA [   ], NOCTURIA [   ], INCREASED FREQUENCY [   ], DRIBLING [   ],  SKELETAL: PAINFUL JOINTS [   ], SWOLLEN JOINTS [   ], NECK ACHE [   ], LOW BACK ACHE [   ],  SKIN : ULCERS [   ], RASH [   ], ITCHING [   ]  CNS: HEAD ACHE [   ], DOUBLE VISION [   ], BLURRED VISION [   ], AMS / CONFUSION [   ], SEIZURES [   ], WEAKNESS [   ],TINGLING / NUMBNESS [   ]      PHYSICAL EXAM:    GENERAL APPEARANCE: NO DISTRESS,    ANASARCA ++ [IMPROVED]  HEENT:  NO PALLOR, NO  JVD,  NO   NODES, NECK SUPPLE  CVS: S1 +, S2 +,   RS: AEEB,  OCCASIONAL  RALES +,   CRACKLES+ AT LUNG BASES [IMPROVED]  ABD: SOFT, NT, NO, BS +  EXT: LEFT BKA  SKIN: WARM,   SKELETAL:  ROM ACCEPTABLE  CNS:  AAO X  2-3        RADIOLOGY :    RADIOLOGY AND READINGS REVIEWED      ASSESSMENT :     Hypervolemia    Hypertension    Adrenal insufficiency    CKD (chronic kidney disease)    Anemia    Glaucoma    Coronary artery disease    HLD (hyperlipidemia)    Peripheral vascular disease    Spinal stenosis of lumbosacral region    Hyperparathyroidism    Diabetes mellitus    Diabetic neuropathy    Contracture of hand    Osteoarthritis    Osteoporosis    Vision loss of left eye    ESRD on hemodialysis    Cataract    BPH (benign prostatic hyperplasia)    UTI (urinary tract infection)    Bladder mass    H/O hematuria    Osteoporosis    Vision loss of right eye    Depression    Chronic GERD    Osteomyelitis of vertebra    CHF (congestive heart failure)    Below knee amputation status, left    History of right cataract extraction    History of left cataract extraction    S/P arteriovenous (AV) fistula creation    H/O hematuria    H/O transurethral destruction of bladder lesion    History of excision of mass        PLAN:  HPI:  Patient is 62 year old male from [facility], walks with RW, with PMH of ESRD on HD (TTS), BKA- L w/prosthetic leg, DM, Left eye blindness, CHF, CAD, HTN, HLD, osteoporosis, bronchitis, current smoker, and anemia who presents with complaint of missed dialysis. Last HD 7/22. Pt states he often missed HD sessions.  patient states he missed this HD session due to mild pain in left leg, patient remains able to ambulate. Pt complains of right leg swelling and states he does not make any urine. Patient states he was having mild shortness of breath.  Pt denies any fever, chills, N/V/D, constipation, CP, numbness or tingling (26 Jul 2023 19:20)    # [8/9] MEETING HELD WITH PATIENT, BROTHER ARTEMIO @ [phone] AND SW TEAM. PATIENT W/ HISTORY OF ROUTINE NONCOMPLIANCE W/ LIFE-SUSTAINING TREATMENT [HD], EVALUATIONS AND INTERVENTIONS - COUNSELLED AT LENGTH TO REMAIN COMPLIANT. DISCUSSED THAT PROGNOSIS IS POOR/GRIM GIVEN UNDERLYING MEDICAL CONDITIONS AND GIVEN NONCOMPLIANCE. HE VERBALIZED UNDERSTANDING. REGARDING GOC - PATIENT WISHES FOR FULL CODE. PALLIATIVE CARE CONSULTED. PSYCHIATRY CONSULTED. PATIENT EXPRESSED THAT HE DOES GROW IMPATIENT DURING DIALYSIS SESSIONS AND HAS BEEN LEAVING SESSIONS SOONER THAN PRESCRIBED. IN DISCUSSION THAT RISKS OF DEFERRING COMPLETE HD - INCLUDE BUT ARE NOT LIMITED TO WORSENING CLINICAL CONDITION, ELECTROLYTE ABNORMALITIES, ARRHYTHMIA AND DEATH. HE VERBALIZED UNDERSTANDING. D/W PATIENT THAT REPEATEDLY REFUSING INTERVENTIONS AND ASSESSMENTS IS NOT IN LINE WITH HIS WISHES TO IMPROVE. PATIENT VERBALIZED UNDERSTANDING. DISCUSSED REGARDING CURRENT CLINICAL CONDITION, RECOMMENDED EVALUATIONS AND INTERVENTIONS. ALL QUESTIONS ANSWERED. TEAM HAS OFFERED PATIENT EMOTIONAL SUPPORT AND OFFERED MEDICATION FOR MOOD. OFFERED TO PRE-MEDICATE W/ ANXIOLYTIC PRIOR TO HD. PATIENT IS AGREEABLE.     DISCUSSED THAT PATIENT WILL NEED TO COMPLY WITH HD SESSIONS IN ORDER TO BE MEDICALLY OPTIMIZED FOR DISCHARGE AND FOR SAFE D/C PLANNING. TEAM DISCUSSED OPTIONS OF RETURNING TO [facility] W/ OUTPATIENT HD , IF CARE NEEDS CAN BE SAFELY MET VS. NURSING HOME/[facility] . PATIENT AND BROTHER VERBALIZED UNDERSTANDING.     - SW AND MEDICAL TEAM HAVING ONGOING DISCUSSION WITH PATIENT REGARDING CONSISTENT COMPLIANCE IN ORDER TO RETURN TO HD IN THE OUTPATIENT SETTING  -- CONVEYED THAT PATIENT AND TEAM THAT PATIENT WILL NEED TO CONSISTENTLY COMPLETE FULL HD SESSIONS IN ORDER TO BE CONSIDERED BY OUTPATIENT HD AT NH W/ ONSITE HD OR OUTSIDE HD FACILITY    - PATIENT REQUIRING MORE FREQUENT WEEKLY HD SESSIONS    # FEVER X 1, SINCE AFEBRILE  # R/O INFECTION   # R/O COLITIS    - PATIENT REFUSING IV LINE PLACEMENT AND BLOOD WORK; RISKS OF DEFERRING DISCUSSED, PATIENT VERBALIZED UNDERSTANDING  - PLANNED FOR BLOOD CULTURE, CBC, CMP - WITH HEMODIALYSIS  - PLACED ON PO VANCOMYCIN  - ABD XRAY and CXRAY - W/OUT ACUTE FINDINGS  - ID CONSULT    # ACUTE ON CHRONIC HYPOXIC RESPIRATORY FAILURE S/T PULMONARY EDEMA - S/T INCOMPLETE HD SESSIONS ; ANASARCA  # HYPERKALEMIA  # HX OF COPD + S/P RECENT MULTIFOCAL PNEUMONIA ; R/O ASPIRATION PNEUMONIA  # PULMONARY HYPERTENSION  # UNCONTROLLED HTN  # ESRD ON HD TTS - W/ HX OF NONCOMPLIANCE    - TELEMETRY  - HYDRALAZINE, METOPROLOL AND NORVASC ; PRN IV ANTIHYPERTENSIVE  - LOKELMA  - SUPPLEMENTAL O2  - PLANNED FOR HD  - NEPHROLOGY CONSULT  - PULMONOLOGY CONSULT  - ID CONSULT    - CONTINUES TO REFUSE OR PRE-MATURELY DISCONTINUE SESSIONS OF HD       - [7/30] - OVERNIGHT RAPID RESPONSE S/T HYPOXIA - SELF-LIMITED - PATIENT IMPROVED S/P O2 SUPPLEMENT  - S/P CEFEPIME + FLAGYLL, BCX - NGTD      # RECURRENT INTRACTABLE NAUSEA/VOMITING, ? COFFEE GROUND EMESIS  # GASTROPARESIS  # HISTORY OF ESOPHAGITIS AND DUODENITIS [1/2021], HX OF GASTROPARESIS W/ EVIDENCE OF THICKENED ESOPHAGUS ON PREVIOUS CT SCAN   # PANCREAS W/ DOUBLE DUCT SIGN    - MONITORING HGB, PLACED ON PPI BID , CARAFATE QID  - PRN ANTIEMETICS  ; S/P DOSE OF REGLAN [WITH MINIMAL IMPROVEMENT]  - MONITORING FOR SYMPTOMS  - WHILE ON DIET PATIENT REPEATEDLY COUNSELLED TO CHEW FOOD CAUTIOUSLY AND CONSUME SMALL BITES W/ REGULAR CLEARING WITH WATER BETWEEN BITES    - ADVANCE DIET AS TOLERATED  - NOTED CT A/P    - S/P RECENT PRBC TRANSFUSION     - GI CONSULT  - SURGERY CONSULT    - [8/14] - EPISODE OF NAUSEA/VOMITING OVER THE WEEKEND - IMPROVED    # LESS LIKELY CHOLECYSTITIS  - S/P ABX  - NOTED CT A/P  - HIDA SCAN - NEGATIVE  - SURGERY CONSULT    # ELEVATED TROPONINS - ? DEMAND ISCHEMIA    - S/P TELEMETRY  - TREND TROPONINS  - ECHO - TRACE MR, MODERATELY INCREASED LV WALL THICKNESS, NORMAL LV SYSTOLIC FUNCTION, SEVERE PULMONARY HTN  - CARDIOLOGY CONSULT    # EPISODE OF HYPOGLYCEMIA - IMPROVED  # GASTROPARESIS  # UNDERLYING DM  - SSI + FS  - COUNSELLED TO COMPLY WITH HD TO REDUCE UREMIA    - REPEATEDLY COUNSELLED TO COMPLY WITH FINGERSTICKS      # DEPRESSION  - PLACED ON ZOLOFT  - PSYCHIATRY CONSULT    # PATIENT UNDERGOING OUTPATIENT WORKUP FOR CENTRAL VENOUS STENOSIS THROUGH NEPHROLOGY TEAM  - ? s/p STENT PLACEMENT    # ANEMIA OF CKD  # HX OF PANCYTOPENIA   - TREND HGB, TRANSFUSION THRESHOLD HGB < 7; PATIENT STILL INTERMITTENTLY REFUSING BLOODWORK, COUNSELLED TO COMPLY  - TYPE AND SCREEN  - ON EPO  - DENIES RECENT HEMATEMESIS, MELENA, HEMATOCHEZIA    - S/P RECENT PRBC TRANSFUSION  - S/P PRBC TRANSFUSION 7/29    - PPI BID, CARAFATE QID    # SEVERE PROTEIN CALORIE MALNUTRITION, FAILURE TO THRIVE   -  TEMPORAL WASTING, LOSS OF MUSCLE MASS FROM SHOULDER AND HIP GIRDLE  - NUTRITIONAL SUPPLEMENT    # HLD  # PARTIALLY BLIND  # S/P LEFT BKA  # HX OF PVD  # LS SPINAL STENOSIS  # GI AND DVT PPX   Patient is a 62y old  Male who presents with a chief complaint of Missed dialysis (23 Aug 2023 18:30)    PATIENT IS SEEN AND EXAMINED IN MEDICAL FLOOR.      ALLERGIES:  No Known Drug Allergies  fish (Rash)  liver (Anaphylaxis)      VITALS:    Vital Signs Last 24 Hrs  T(C): 37.2 (24 Aug 2023 05:28), Max: 37.2 (24 Aug 2023 05:28)  T(F): 99 (24 Aug 2023 05:28), Max: 99 (24 Aug 2023 05:28)  HR: 88 (24 Aug 2023 05:28) (78 - 92)  BP: 165/89 (24 Aug 2023 05:28) (121/58 - 165/89)  BP(mean): 73 (23 Aug 2023 20:12) (73 - 73)  RR: 18 (24 Aug 2023 05:28) (16 - 18)  SpO2: 99% (24 Aug 2023 05:28) (94% - 100%)    Parameters below as of 24 Aug 2023 05:28  Patient On (Oxygen Delivery Method): room air        LABS:    CBC Full  -  ( 23 Aug 2023 10:30 )  WBC Count : 2.86 K/uL  RBC Count : 3.64 M/uL  Hemoglobin : 10.1 g/dL  Hematocrit : 31.9 %  Platelet Count - Automated : 85 K/uL  Mean Cell Volume : 87.6 fl  Mean Cell Hemoglobin : 27.7 pg  Mean Cell Hemoglobin Concentration : 31.7 gm/dL  Auto Neutrophil # : x  Auto Lymphocyte # : x  Auto Monocyte # : x  Auto Eosinophil # : x  Auto Basophil # : x  Auto Neutrophil % : x  Auto Lymphocyte % : x  Auto Monocyte % : x  Auto Eosinophil % : x  Auto Basophil % : x      08-23    122<L>  |  94<L>  |  57<H>  ----------------------------<  182<H>  5.4<H>   |  17<L>  |  10.70<H>    Ca    7.9<L>      23 Aug 2023 10:30  Phos  5.6     08-23  Mg     2.9     08-23    TPro  6.8  /  Alb  3.0<L>  /  TBili  0.4  /  DBili  x   /  AST  27  /  ALT  54  /  AlkPhos  103  08-23    CAPILLARY BLOOD GLUCOSE      POCT Blood Glucose.: 204 mg/dL (24 Aug 2023 07:43)  POCT Blood Glucose.: 214 mg/dL (23 Aug 2023 16:50)        LIVER FUNCTIONS - ( 23 Aug 2023 10:30 )  Alb: 3.0 g/dL / Pro: 6.8 g/dL / ALK PHOS: 103 U/L / ALT: 54 U/L DA / AST: 27 U/L / GGT: x           Creatinine Trend: 10.70<--, 12.00<--, 12.30<--, 10.70<--, 11.70<--, 12.90<--  I&O's Summary    23 Aug 2023 07:01  -  24 Aug 2023 07:00  --------------------------------------------------------  IN: 0 mL / OUT: 4 mL / NET: -4 mL            .Blood Blood-Peripheral  05-28 @ 14:07   No Growth Final  --  --          MEDICATIONS:    MEDICATIONS  (STANDING):  albuterol    90 MICROgram(s) HFA Inhaler 2 Puff(s) Inhalation every 6 hours  amLODIPine   Tablet 10 milliGRAM(s) Oral daily  aspirin enteric coated 81 milliGRAM(s) Oral daily  atorvastatin 40 milliGRAM(s) Oral at bedtime  budesonide 160 MICROgram(s)/formoterol 4.5 MICROgram(s) Inhaler 2 Puff(s) Inhalation two times a day  chlorhexidine 2% Cloths 1 Application(s) Topical daily  dextrose 5%. 1000 milliLiter(s) (50 mL/Hr) IV Continuous <Continuous>  dextrose 5%. 1000 milliLiter(s) (100 mL/Hr) IV Continuous <Continuous>  dextrose 50% Injectable 25 Gram(s) IV Push once  dextrose 50% Injectable 12.5 Gram(s) IV Push once  dextrose 50% Injectable 25 Gram(s) IV Push once  epoetin beverley (PROCRIT) Injectable 56961 Unit(s) IV Push <User Schedule>  epoetin beverley-epbx (RETACRIT) Injectable 73559 Unit(s) IV Push <User Schedule>  folic acid 1 milliGRAM(s) Oral daily  furosemide    Tablet 40 milliGRAM(s) Oral daily  glucagon  Injectable 1 milliGRAM(s) IntraMuscular once  hydrALAZINE 25 milliGRAM(s) Oral three times a day  lactobacillus acidophilus 1 Tablet(s) Oral two times a day with meals  latanoprost 0.005% Ophthalmic Solution 1 Drop(s) Both EYES at bedtime  levothyroxine 25 MICROGram(s) Oral daily  lidocaine   4% Patch 2 Patch Transdermal daily  melatonin 3 milliGRAM(s) Oral at bedtime  metoprolol succinate ER 50 milliGRAM(s) Oral daily  pantoprazole    Tablet 40 milliGRAM(s) Oral before breakfast  sertraline 200 milliGRAM(s) Oral daily  sevelamer carbonate 800 milliGRAM(s) Oral three times a day with meals  sucralfate 1 Gram(s) Oral four times a day  vancomycin    Solution 125 milliGRAM(s) Oral every 6 hours      MEDICATIONS  (PRN):  acetaminophen     Tablet .. 1000 milliGRAM(s) Oral every 8 hours PRN Moderate Pain (4 - 6)  dextrose Oral Gel 15 Gram(s) Oral once PRN Blood Glucose LESS THAN 70 milliGRAM(s)/deciliter  ondansetron   Disintegrating Tablet 4 milliGRAM(s) Oral two times a day PRN Nausea and/or Vomiting  ondansetron Injectable 4 milliGRAM(s) IV Push every 6 hours PRN Nausea and/or Vomiting      REVIEW OF SYSTEMS:                           ALL ROS DONE [ X   ]    CONSTITUTIONAL:  LETHARGIC [   ], FEVER [   ], UNRESPONSIVE [   ]  CVS:  CP  [   ], SOB, [   ], PALPITATIONS [   ], DIZZYNESS [   ]  RS: COUGH [   ], SPUTUM [   ]  GI: ABDOMINAL PAIN [   ], NAUSEA [   ], VOMITINGS [   ], DIARRHEA [   ], CONSTIPATION [   ]  :  DYSURIA [   ], NOCTURIA [   ], INCREASED FREQUENCY [   ], DRIBLING [   ],  SKELETAL: PAINFUL JOINTS [   ], SWOLLEN JOINTS [   ], NECK ACHE [   ], LOW BACK ACHE [   ],  SKIN : ULCERS [   ], RASH [   ], ITCHING [   ]  CNS: HEAD ACHE [   ], DOUBLE VISION [   ], BLURRED VISION [   ], AMS / CONFUSION [   ], SEIZURES [   ], WEAKNESS [   ],TINGLING / NUMBNESS [   ]      PHYSICAL EXAM:    GENERAL APPEARANCE: NO DISTRESS,    ANASARCA ++ [IMPROVED]  HEENT:  NO PALLOR, NO  JVD,  NO   NODES, NECK SUPPLE  CVS: S1 +, S2 +,   RS: AEEB,  OCCASIONAL  RALES +,   CRACKLES+ AT LUNG BASES [IMPROVED]  ABD: SOFT, NT, NO, BS +  EXT: LEFT BKA  SKIN: WARM,   SKELETAL:  ROM ACCEPTABLE  CNS:  AAO X  2-3        RADIOLOGY :    RADIOLOGY AND READINGS REVIEWED      ASSESSMENT :     Hypervolemia    Hypertension    Adrenal insufficiency    CKD (chronic kidney disease)    Anemia    Glaucoma    Coronary artery disease    HLD (hyperlipidemia)    Peripheral vascular disease    Spinal stenosis of lumbosacral region    Hyperparathyroidism    Diabetes mellitus    Diabetic neuropathy    Contracture of hand    Osteoarthritis    Osteoporosis    Vision loss of left eye    ESRD on hemodialysis    Cataract    BPH (benign prostatic hyperplasia)    UTI (urinary tract infection)    Bladder mass    H/O hematuria    Osteoporosis    Vision loss of right eye    Depression    Chronic GERD    Osteomyelitis of vertebra    CHF (congestive heart failure)    Below knee amputation status, left    History of right cataract extraction    History of left cataract extraction    S/P arteriovenous (AV) fistula creation    H/O hematuria    H/O transurethral destruction of bladder lesion    History of excision of mass        PLAN:  HPI:  Patient is 62 year old male from [facility], walks with RW, with PMH of ESRD on HD (TTS), BKA- L w/prosthetic leg, DM, Left eye blindness, CHF, CAD, HTN, HLD, osteoporosis, bronchitis, current smoker, and anemia who presents with complaint of missed dialysis. Last HD 7/22. Pt states he often missed HD sessions.  patient states he missed this HD session due to mild pain in left leg, patient remains able to ambulate. Pt complains of right leg swelling and states he does not make any urine. Patient states he was having mild shortness of breath.  Pt denies any fever, chills, N/V/D, constipation, CP, numbness or tingling (26 Jul 2023 19:20)    # [8/9] MEETING HELD WITH PATIENT, BROTHER ARTEMIO @ [phone] AND SW TEAM. PATIENT W/ HISTORY OF ROUTINE NONCOMPLIANCE W/ LIFE-SUSTAINING TREATMENT [HD], EVALUATIONS AND INTERVENTIONS - COUNSELLED AT LENGTH TO REMAIN COMPLIANT. DISCUSSED THAT PROGNOSIS IS POOR/GRIM GIVEN UNDERLYING MEDICAL CONDITIONS AND GIVEN NONCOMPLIANCE. HE VERBALIZED UNDERSTANDING. REGARDING GOC - PATIENT WISHES FOR FULL CODE. PALLIATIVE CARE CONSULTED. PSYCHIATRY CONSULTED. PATIENT EXPRESSED THAT HE DOES GROW IMPATIENT DURING DIALYSIS SESSIONS AND HAS BEEN LEAVING SESSIONS SOONER THAN PRESCRIBED. IN DISCUSSION THAT RISKS OF DEFERRING COMPLETE HD - INCLUDE BUT ARE NOT LIMITED TO WORSENING CLINICAL CONDITION, ELECTROLYTE ABNORMALITIES, ARRHYTHMIA AND DEATH. HE VERBALIZED UNDERSTANDING. D/W PATIENT THAT REPEATEDLY REFUSING INTERVENTIONS AND ASSESSMENTS IS NOT IN LINE WITH HIS WISHES TO IMPROVE. PATIENT VERBALIZED UNDERSTANDING. DISCUSSED REGARDING CURRENT CLINICAL CONDITION, RECOMMENDED EVALUATIONS AND INTERVENTIONS. ALL QUESTIONS ANSWERED. TEAM HAS OFFERED PATIENT EMOTIONAL SUPPORT AND OFFERED MEDICATION FOR MOOD. OFFERED TO PRE-MEDICATE W/ ANXIOLYTIC PRIOR TO HD. PATIENT IS AGREEABLE.     DISCUSSED THAT PATIENT WILL NEED TO COMPLY WITH HD SESSIONS IN ORDER TO BE MEDICALLY OPTIMIZED FOR DISCHARGE AND FOR SAFE D/C PLANNING. TEAM DISCUSSED OPTIONS OF RETURNING TO [facility] W/ OUTPATIENT HD , IF CARE NEEDS CAN BE SAFELY MET VS. NURSING HOME/[facility] . PATIENT AND BROTHER VERBALIZED UNDERSTANDING.     - SW AND MEDICAL TEAM HAVING ONGOING DISCUSSION WITH PATIENT REGARDING CONSISTENT COMPLIANCE IN ORDER TO RETURN TO HD IN THE OUTPATIENT SETTING  -- CONVEYED THAT PATIENT AND TEAM THAT PATIENT WILL NEED TO CONSISTENTLY COMPLETE FULL HD SESSIONS IN ORDER TO BE CONSIDERED BY OUTPATIENT HD AT NH W/ ONSITE HD OR OUTSIDE HD FACILITY    - PATIENT REQUIRING MORE FREQUENT WEEKLY HD SESSIONS    # FEVER X 1, SINCE AFEBRILE  # R/O INFECTION   # R/O COLITIS    - PATIENT REFUSING IV LINE PLACEMENT AND BLOOD WORK; RISKS OF DEFERRING DISCUSSED, PATIENT VERBALIZED UNDERSTANDING  - PLANNED FOR BLOOD CULTURE, CBC, CMP - WITH HEMODIALYSIS  - PLACED ON PO VANCOMYCIN  - ABD XRAY and CXRAY - W/OUT ACUTE FINDINGS  - ID CONSULT    # ACUTE ON CHRONIC HYPOXIC RESPIRATORY FAILURE S/T PULMONARY EDEMA - S/T INCOMPLETE HD SESSIONS ; ANASARCA  # HYPERKALEMIA  # HX OF COPD + S/P RECENT MULTIFOCAL PNEUMONIA ; R/O ASPIRATION PNEUMONIA  # PULMONARY HYPERTENSION  # UNCONTROLLED HTN  # ESRD ON HD TTS - W/ HX OF NONCOMPLIANCE    - TELEMETRY  - HYDRALAZINE, METOPROLOL AND NORVASC ; PRN IV ANTIHYPERTENSIVE  - LOKELMA  - SUPPLEMENTAL O2  - PLANNED FOR HD  - NEPHROLOGY CONSULT  - PULMONOLOGY CONSULT  - ID CONSULT    - CONTINUES TO REFUSE OR PRE-MATURELY DISCONTINUE SESSIONS OF HD       - [7/30] - OVERNIGHT RAPID RESPONSE S/T HYPOXIA - SELF-LIMITED - PATIENT IMPROVED S/P O2 SUPPLEMENT  - S/P CEFEPIME + FLAGYLL, BCX - NGTD      # RECURRENT INTRACTABLE NAUSEA/VOMITING, ? COFFEE GROUND EMESIS  # GASTROPARESIS  # HISTORY OF ESOPHAGITIS AND DUODENITIS [1/2021], HX OF GASTROPARESIS W/ EVIDENCE OF THICKENED ESOPHAGUS ON PREVIOUS CT SCAN   # PANCREAS W/ DOUBLE DUCT SIGN    - MONITORING HGB, PLACED ON PPI BID , CARAFATE QID  - PRN ANTIEMETICS  ; S/P DOSE OF REGLAN [WITH MINIMAL IMPROVEMENT]  - MONITORING FOR SYMPTOMS  - WHILE ON DIET PATIENT REPEATEDLY COUNSELLED TO CHEW FOOD CAUTIOUSLY AND CONSUME SMALL BITES W/ REGULAR CLEARING WITH WATER BETWEEN BITES    - ADVANCE DIET AS TOLERATED  - NOTED CT A/P    - S/P RECENT PRBC TRANSFUSION     - GI CONSULT  - SURGERY CONSULT    - [8/14] - EPISODE OF NAUSEA/VOMITING OVER THE WEEKEND - IMPROVED    # LESS LIKELY CHOLECYSTITIS  - S/P ABX  - NOTED CT A/P  - HIDA SCAN - NEGATIVE  - SURGERY CONSULT    # ELEVATED TROPONINS - ? DEMAND ISCHEMIA    - S/P TELEMETRY  - TREND TROPONINS  - ECHO - TRACE MR, MODERATELY INCREASED LV WALL THICKNESS, NORMAL LV SYSTOLIC FUNCTION, SEVERE PULMONARY HTN  - CARDIOLOGY CONSULT    # EPISODE OF HYPOGLYCEMIA - IMPROVED  # GASTROPARESIS  # UNDERLYING DM  - SSI + FS  - COUNSELLED TO COMPLY WITH HD TO REDUCE UREMIA    - REPEATEDLY COUNSELLED TO COMPLY WITH FINGERSTICKS      # DEPRESSION  - PLACED ON ZOLOFT  - PSYCHIATRY CONSULT    # PATIENT UNDERGOING OUTPATIENT WORKUP FOR CENTRAL VENOUS STENOSIS THROUGH NEPHROLOGY TEAM  - ? s/p STENT PLACEMENT    # ANEMIA OF CKD  # HX OF PANCYTOPENIA   - TREND HGB, TRANSFUSION THRESHOLD HGB < 7; PATIENT STILL INTERMITTENTLY REFUSING BLOODWORK, COUNSELLED TO COMPLY  - TYPE AND SCREEN  - ON EPO  - DENIES RECENT HEMATEMESIS, MELENA, HEMATOCHEZIA    - S/P RECENT PRBC TRANSFUSION  - S/P PRBC TRANSFUSION 7/29    - PPI BID, CARAFATE QID    # SEVERE PROTEIN CALORIE MALNUTRITION, FAILURE TO THRIVE   -  TEMPORAL WASTING, LOSS OF MUSCLE MASS FROM SHOULDER AND HIP GIRDLE  - NUTRITIONAL SUPPLEMENT    # HLD  # PARTIALLY BLIND  # S/P LEFT BKA  # HX OF PVD  # LS SPINAL STENOSIS  # GI AND DVT PPX

## 2023-08-24 NOTE — PROGRESS NOTE ADULT - SUBJECTIVE AND OBJECTIVE BOX
[facility] : Progress Note :: [phone], (office [phone]),   Dr Mosher / Dr Washington / Dr Young / Dr Doll / Dr Varsha TURCIOS / Dr Tello / Dr Garcia / Dr Larry qiu  _____________________________________________________________________________________________   for HD today    No Known Drug Allergies  fish (Rash)  liver (Anaphylaxis)    Hospital Medications:   MEDICATIONS  (STANDING):  albuterol    90 MICROgram(s) HFA Inhaler 2 Puff(s) Inhalation every 6 hours  amLODIPine   Tablet 10 milliGRAM(s) Oral daily  aspirin enteric coated 81 milliGRAM(s) Oral daily  atorvastatin 40 milliGRAM(s) Oral at bedtime  budesonide 160 MICROgram(s)/formoterol 4.5 MICROgram(s) Inhaler 2 Puff(s) Inhalation two times a day  chlorhexidine 2% Cloths 1 Application(s) Topical daily  dextrose 5%. 1000 milliLiter(s) (50 mL/Hr) IV Continuous <Continuous>  dextrose 5%. 1000 milliLiter(s) (100 mL/Hr) IV Continuous <Continuous>  dextrose 50% Injectable 25 Gram(s) IV Push once  dextrose 50% Injectable 12.5 Gram(s) IV Push once  dextrose 50% Injectable 25 Gram(s) IV Push once  epoetin beverley (PROCRIT) Injectable 32254 Unit(s) IV Push <User Schedule>  epoetin beverley-epbx (RETACRIT) Injectable 59016 Unit(s) IV Push <User Schedule>  folic acid 1 milliGRAM(s) Oral daily  furosemide    Tablet 40 milliGRAM(s) Oral daily  glucagon  Injectable 1 milliGRAM(s) IntraMuscular once  hydrALAZINE 25 milliGRAM(s) Oral three times a day  lactobacillus acidophilus 1 Tablet(s) Oral two times a day with meals  latanoprost 0.005% Ophthalmic Solution 1 Drop(s) Both EYES at bedtime  levothyroxine 25 MICROGram(s) Oral daily  lidocaine   4% Patch 2 Patch Transdermal daily  melatonin 3 milliGRAM(s) Oral at bedtime  metoprolol succinate ER 50 milliGRAM(s) Oral daily  pantoprazole    Tablet 40 milliGRAM(s) Oral before breakfast  sertraline 200 milliGRAM(s) Oral daily  sevelamer carbonate 800 milliGRAM(s) Oral three times a day with meals  sucralfate 1 Gram(s) Oral four times a day  vancomycin    Solution 125 milliGRAM(s) Oral every 6 hours        VITALS:  T(F): 99 (08-24-23 @ 05:28), Max: 99 (08-24-23 @ 05:28)  HR: 88 (08-24-23 @ 05:28)  BP: 165/89 (08-24-23 @ 05:28)  RR: 18 (08-24-23 @ 05:28)  SpO2: 99% (08-24-23 @ 05:28)  Wt(kg): --    08-23 @ 07:01  -  08-24 @ 07:00  --------------------------------------------------------  IN: 0 mL / OUT: 4 mL / NET: -4 mL        PHYSICAL EXAM:  Constitutional: NAD  HEENT: anicteric sclera, oropharynx clear.  Neck: No JVD  Respiratory: CTAB, no wheezes, rales or rhonchi  Cardiovascular: S1, S2, RRR  Gastrointestinal: BS+, soft, NT/ND  Extremities: No peripheral edema  Neurological: A/O x 3, no focal deficits  Vascular Access: AVF with thrill and bruit     LABS:  08-23    122<L>  |  94<L>  |  57<H>  ----------------------------<  182<H>  5.4<H>   |  17<L>  |  10.70<H>    Ca    7.9<L>      23 Aug 2023 10:30  Phos  5.6     08-23  Mg     2.9     08-23    TPro  6.8  /  Alb  3.0<L>  /  TBili  0.4  /  DBili      /  AST  27  /  ALT  54  /  AlkPhos  103  08-23    Creatinine Trend: 10.70 <--, 12.00 <--, 12.30 <--                        10.1   2.86  )-----------( 85       ( 23 Aug 2023 10:30 )             31.9     Urine Studies:  Urinalysis Basic - ( 23 Aug 2023 10:30 )    Color:  / Appearance:  / SG:  / pH:   Gluc: 182 mg/dL / Ketone:   / Bili:  / Urobili:    Blood:  / Protein:  / Nitrite:    Leuk Esterase:  / RBC:  / WBC    Sq Epi:  / Non Sq Epi:  / Bacteria:         RADIOLOGY & ADDITIONAL STUDIES:

## 2023-08-24 NOTE — PROGRESS NOTE ADULT - PROBLEM SELECTOR PLAN 2
- episode of fever, loose stools, now resolved  - started on vancomycin po 125 mg for 10 days   - abd xray and cxray with no acute findings

## 2023-08-24 NOTE — PROGRESS NOTE ADULT - ASSESSMENT
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 Patient presented to ED with difficulty breathing, SOB, missed dialysis. Patient admitted to medicine for AHRF secondary to fluid overload from missed HD session. Hospital course complicated by hypoglycemia. Cardiology consulted recommending ECHO noted, normal LV fx severe pulm HTN. Keep patient net negative. Pulmonology recommending budesonide and albuterol PRN. Patient hospital course complicated by fever and hypotension, with loose stool. Patient refuse blood culture and IV placement. Patient allowed attempt eventually however difficult venous access and patient refuse further attempts. ID recommended Vancomycin 125mgs po q6hrs,  for a total of 10 days.    Patient for LTC placement with HD facility.

## 2023-08-25 NOTE — PROGRESS NOTE ADULT - ASSESSMENT
# ESRD. on HD TIW.  tolerating HD well today  continue HD MWF schedule for now   metabolic acidosis from ESRD.  UF with HD   D/W pt compliance with HD   pre-HD Ativan   # anemia of CKD. epogen on HD. Transfuse for HBG <7  #CKDMBD. cont sevelamer  # HTN. blood pressure at goal     D/C planning to nursing home with dialysis capabilities      For any question, call:  Cell # [phone]  Pager # [phone]  Callback # [phone]

## 2023-08-25 NOTE — PROGRESS NOTE ADULT - SUBJECTIVE AND OBJECTIVE BOX
[facility] - POLA Garcia / POLA Doll / AKSHAT Tello/ POLA Maddox/ POLA Young/ ELISA Washington / JARAD Mosher / FLORA Laboy  ---------------------------------------------------------------------------------------------------------------  seen and examined today for ESRD  Interval : NAD  VITALS:  T(F): 98.1 (08-25-23 @ 10:14), Max: 98.1 (08-25-23 @ 10:14)  HR: 78 (08-25-23 @ 10:14)  BP: 114/64 (08-25-23 @ 10:14)  RR: 18 (08-25-23 @ 10:14)  SpO2: 99% (08-25-23 @ 10:14)  Wt(kg): --    Physical Exam :-  Constitutional: NAD  Neck: Supple.  Respiratory: Bilateral equal breath sounds,  Cardiovascular: S1, S2 normal,  Gastrointestinal: Bowel Sounds present, soft, non tender.  Extremities: No edema, left BKA  Neurological: Alert and Oriented  Psychiatric: Normal mood, normal affect  Data:-  Allergies :   No Known Drug Allergies  fish (Rash)  liver (Anaphylaxis)    Hospital Medications:   MEDICATIONS  (STANDING):  albuterol    90 MICROgram(s) HFA Inhaler 2 Puff(s) Inhalation every 6 hours  amLODIPine   Tablet 10 milliGRAM(s) Oral daily  aspirin enteric coated 81 milliGRAM(s) Oral daily  atorvastatin 40 milliGRAM(s) Oral at bedtime  budesonide 160 MICROgram(s)/formoterol 4.5 MICROgram(s) Inhaler 2 Puff(s) Inhalation two times a day  chlorhexidine 2% Cloths 1 Application(s) Topical daily  dextrose 5%. 1000 milliLiter(s) (50 mL/Hr) IV Continuous <Continuous>  dextrose 5%. 1000 milliLiter(s) (100 mL/Hr) IV Continuous <Continuous>  dextrose 50% Injectable 25 Gram(s) IV Push once  dextrose 50% Injectable 25 Gram(s) IV Push once  dextrose 50% Injectable 12.5 Gram(s) IV Push once  epoetin beverley (PROCRIT) Injectable 97777 Unit(s) IV Push <User Schedule>  epoetin beverley-epbx (RETACRIT) Injectable 01438 Unit(s) IV Push <User Schedule>  folic acid 1 milliGRAM(s) Oral daily  furosemide    Tablet 80 milliGRAM(s) Oral daily  glucagon  Injectable 1 milliGRAM(s) IntraMuscular once  hydrALAZINE 25 milliGRAM(s) Oral three times a day  lactobacillus acidophilus 1 Tablet(s) Oral two times a day with meals  latanoprost 0.005% Ophthalmic Solution 1 Drop(s) Both EYES at bedtime  levothyroxine 25 MICROGram(s) Oral daily  lidocaine   4% Patch 2 Patch Transdermal daily  LORazepam     Tablet 2 milliGRAM(s) Oral <User Schedule>  melatonin 3 milliGRAM(s) Oral at bedtime  metoprolol succinate ER 50 milliGRAM(s) Oral daily  pantoprazole    Tablet 40 milliGRAM(s) Oral before breakfast  sertraline 200 milliGRAM(s) Oral daily  sevelamer carbonate 800 milliGRAM(s) Oral three times a day with meals  sucralfate 1 Gram(s) Oral four times a day  vancomycin    Solution 125 milliGRAM(s) Oral every 6 hours          Creatinine Trend: 10.70 <--, 12.00 <--, 12.30 <--

## 2023-08-25 NOTE — PROGRESS NOTE ADULT - SUBJECTIVE AND OBJECTIVE BOX
Patient is a 62y old  Male who presents with a chief complaint of Missed dialysis (24 Aug 2023 14:48)    PATIENT IS SEEN AND EXAMINED IN MEDICAL FLOOR.  MARIIT [    ]    JEREMY [   ]      GT [   ]    ALLERGIES:  No Known Drug Allergies  fish (Rash)  liver (Anaphylaxis)      Daily     Daily     VITALS:    Vital Signs Last 24 Hrs  T(C): 36.7 (25 Aug 2023 04:33), Max: 36.7 (25 Aug 2023 04:33)  T(F): 98 (25 Aug 2023 04:33), Max: 98 (25 Aug 2023 04:33)  HR: 84 (25 Aug 2023 04:33) (71 - 84)  BP: 119/67 (25 Aug 2023 04:33) (100/61 - 126/80)  BP(mean): --  RR: 18 (25 Aug 2023 04:33) (18 - 21)  SpO2: 100% (25 Aug 2023 04:33) (99% - 100%)    Parameters below as of 25 Aug 2023 04:33  Patient On (Oxygen Delivery Method): room air        LABS:    CBC Full  -  ( 23 Aug 2023 10:30 )  WBC Count : 2.86 K/uL  RBC Count : 3.64 M/uL  Hemoglobin : 10.1 g/dL  Hematocrit : 31.9 %  Platelet Count - Automated : 85 K/uL  Mean Cell Volume : 87.6 fl  Mean Cell Hemoglobin : 27.7 pg  Mean Cell Hemoglobin Concentration : 31.7 gm/dL  Auto Neutrophil # : x  Auto Lymphocyte # : x  Auto Monocyte # : x  Auto Eosinophil # : x  Auto Basophil # : x  Auto Neutrophil % : x  Auto Lymphocyte % : x  Auto Monocyte % : x  Auto Eosinophil % : x  Auto Basophil % : x      08-23    122<L>  |  94<L>  |  57<H>  ----------------------------<  182<H>  5.4<H>   |  17<L>  |  10.70<H>    Ca    7.9<L>      23 Aug 2023 10:30  Phos  5.6     08-23  Mg     2.9     08-23    TPro  6.8  /  Alb  3.0<L>  /  TBili  0.4  /  DBili  x   /  AST  27  /  ALT  54  /  AlkPhos  103  08-23    CAPILLARY BLOOD GLUCOSE      POCT Blood Glucose.: 217 mg/dL (25 Aug 2023 08:23)  POCT Blood Glucose.: 189 mg/dL (24 Aug 2023 21:14)  POCT Blood Glucose.: 196 mg/dL (24 Aug 2023 16:38)  POCT Blood Glucose.: 233 mg/dL (24 Aug 2023 11:34)        LIVER FUNCTIONS - ( 23 Aug 2023 10:30 )  Alb: 3.0 g/dL / Pro: 6.8 g/dL / ALK PHOS: 103 U/L / ALT: 54 U/L DA / AST: 27 U/L / GGT: x           Creatinine Trend: 10.70<--, 12.00<--, 12.30<--, 10.70<--, 11.70<--, 12.90<--  I&O's Summary          .Blood Blood-Peripheral  05-28 @ 14:07   No Growth Final  --  --          MEDICATIONS:    MEDICATIONS  (STANDING):  albuterol    90 MICROgram(s) HFA Inhaler 2 Puff(s) Inhalation every 6 hours  amLODIPine   Tablet 10 milliGRAM(s) Oral daily  aspirin enteric coated 81 milliGRAM(s) Oral daily  atorvastatin 40 milliGRAM(s) Oral at bedtime  budesonide 160 MICROgram(s)/formoterol 4.5 MICROgram(s) Inhaler 2 Puff(s) Inhalation two times a day  chlorhexidine 2% Cloths 1 Application(s) Topical daily  dextrose 5%. 1000 milliLiter(s) (50 mL/Hr) IV Continuous <Continuous>  dextrose 5%. 1000 milliLiter(s) (100 mL/Hr) IV Continuous <Continuous>  dextrose 50% Injectable 25 Gram(s) IV Push once  dextrose 50% Injectable 12.5 Gram(s) IV Push once  dextrose 50% Injectable 25 Gram(s) IV Push once  epoetin beverley (PROCRIT) Injectable 65109 Unit(s) IV Push <User Schedule>  epoetin beverley-epbx (RETACRIT) Injectable 73902 Unit(s) IV Push <User Schedule>  folic acid 1 milliGRAM(s) Oral daily  furosemide    Tablet 80 milliGRAM(s) Oral daily  glucagon  Injectable 1 milliGRAM(s) IntraMuscular once  hydrALAZINE 25 milliGRAM(s) Oral three times a day  lactobacillus acidophilus 1 Tablet(s) Oral two times a day with meals  latanoprost 0.005% Ophthalmic Solution 1 Drop(s) Both EYES at bedtime  levothyroxine 25 MICROGram(s) Oral daily  lidocaine   4% Patch 2 Patch Transdermal daily  LORazepam     Tablet 2 milliGRAM(s) Oral <User Schedule>  melatonin 3 milliGRAM(s) Oral at bedtime  metoprolol succinate ER 50 milliGRAM(s) Oral daily  pantoprazole    Tablet 40 milliGRAM(s) Oral before breakfast  sertraline 200 milliGRAM(s) Oral daily  sevelamer carbonate 800 milliGRAM(s) Oral three times a day with meals  sucralfate 1 Gram(s) Oral four times a day  vancomycin    Solution 125 milliGRAM(s) Oral every 6 hours      MEDICATIONS  (PRN):  acetaminophen     Tablet .. 1000 milliGRAM(s) Oral every 8 hours PRN Moderate Pain (4 - 6)  dextrose Oral Gel 15 Gram(s) Oral once PRN Blood Glucose LESS THAN 70 milliGRAM(s)/deciliter  ondansetron   Disintegrating Tablet 4 milliGRAM(s) Oral two times a day PRN Nausea and/or Vomiting  ondansetron Injectable 4 milliGRAM(s) IV Push every 6 hours PRN Nausea and/or Vomiting      REVIEW OF SYSTEMS:                           ALL ROS DONE [ X   ]    CONSTITUTIONAL:  LETHARGIC [   ], FEVER [   ], UNRESPONSIVE [   ]  CVS:  CP  [   ], SOB, [   ], PALPITATIONS [   ], DIZZYNESS [   ]  RS: COUGH [   ], SPUTUM [   ]  GI: ABDOMINAL PAIN [   ], NAUSEA [   ], VOMITINGS [   ], DIARRHEA [   ], CONSTIPATION [   ]  :  DYSURIA [   ], NOCTURIA [   ], INCREASED FREQUENCY [   ], DRIBLING [   ],  SKELETAL: PAINFUL JOINTS [   ], SWOLLEN JOINTS [   ], NECK ACHE [   ], LOW BACK ACHE [   ],  SKIN : ULCERS [   ], RASH [   ], ITCHING [   ]  CNS: HEAD ACHE [   ], DOUBLE VISION [   ], BLURRED VISION [   ], AMS / CONFUSION [   ], SEIZURES [   ], WEAKNESS [   ],TINGLING / NUMBNESS [   ]      PHYSICAL EXAM:    GENERAL APPEARANCE: NO DISTRESS,    ANASARCA ++ [IMPROVED]  HEENT:  NO PALLOR, NO  JVD,  NO   NODES, NECK SUPPLE  CVS: S1 +, S2 +,   RS: AEEB,  OCCASIONAL  RALES +,   CRACKLES+ AT LUNG BASES [IMPROVED]  ABD: SOFT, NT, NO, BS +  EXT: LEFT BKA  SKIN: WARM,   SKELETAL:  ROM ACCEPTABLE  CNS:  AAO X  2-3        RADIOLOGY :    RADIOLOGY AND READINGS REVIEWED      ASSESSMENT :     Hypervolemia    Hypertension    Adrenal insufficiency    CKD (chronic kidney disease)    Anemia    Glaucoma    Coronary artery disease    HLD (hyperlipidemia)    Peripheral vascular disease    Spinal stenosis of lumbosacral region    Hyperparathyroidism    Diabetes mellitus    Diabetic neuropathy    Contracture of hand    Osteoarthritis    Osteoporosis    Vision loss of left eye    ESRD on hemodialysis    Cataract    BPH (benign prostatic hyperplasia)    UTI (urinary tract infection)    Bladder mass    H/O hematuria    Osteoporosis    Vision loss of right eye    Depression    Chronic GERD    Osteomyelitis of vertebra    CHF (congestive heart failure)    Below knee amputation status, left    History of right cataract extraction    History of left cataract extraction    S/P arteriovenous (AV) fistula creation    H/O hematuria    H/O transurethral destruction of bladder lesion    History of excision of mass        PLAN:  HPI:  Patient is 62 year old male from [facility], walks with RW, with PMH of ESRD on HD (TTS), BKA- L w/prosthetic leg, DM, Left eye blindness, CHF, CAD, HTN, HLD, osteoporosis, bronchitis, current smoker, and anemia who presents with complaint of missed dialysis. Last HD 7/22. Pt states he often missed HD sessions.  patient states he missed this HD session due to mild pain in left leg, patient remains able to ambulate. Pt complains of right leg swelling and states he does not make any urine. Patient states he was having mild shortness of breath.  Pt denies any fever, chills, N/V/D, constipation, CP, numbness or tingling (26 Jul 2023 19:20)    # [8/9] MEETING HELD WITH PATIENT, BROTHER ARTEMIO @ [phone] AND SW TEAM. PATIENT W/ HISTORY OF ROUTINE NONCOMPLIANCE W/ LIFE-SUSTAINING TREATMENT [HD], EVALUATIONS AND INTERVENTIONS - COUNSELLED AT LENGTH TO REMAIN COMPLIANT. DISCUSSED THAT PROGNOSIS IS POOR/GRIM GIVEN UNDERLYING MEDICAL CONDITIONS AND GIVEN NONCOMPLIANCE. HE VERBALIZED UNDERSTANDING. REGARDING GOC - PATIENT WISHES FOR FULL CODE. PALLIATIVE CARE CONSULTED. PSYCHIATRY CONSULTED. PATIENT EXPRESSED THAT HE DOES GROW IMPATIENT DURING DIALYSIS SESSIONS AND HAS BEEN LEAVING SESSIONS SOONER THAN PRESCRIBED. IN DISCUSSION THAT RISKS OF DEFERRING COMPLETE HD - INCLUDE BUT ARE NOT LIMITED TO WORSENING CLINICAL CONDITION, ELECTROLYTE ABNORMALITIES, ARRHYTHMIA AND DEATH. HE VERBALIZED UNDERSTANDING. D/W PATIENT THAT REPEATEDLY REFUSING INTERVENTIONS AND ASSESSMENTS IS NOT IN LINE WITH HIS WISHES TO IMPROVE. PATIENT VERBALIZED UNDERSTANDING. DISCUSSED REGARDING CURRENT CLINICAL CONDITION, RECOMMENDED EVALUATIONS AND INTERVENTIONS. ALL QUESTIONS ANSWERED. TEAM HAS OFFERED PATIENT EMOTIONAL SUPPORT AND OFFERED MEDICATION FOR MOOD. OFFERED TO PRE-MEDICATE W/ ANXIOLYTIC PRIOR TO HD. PATIENT IS AGREEABLE.     DISCUSSED THAT PATIENT WILL NEED TO COMPLY WITH HD SESSIONS IN ORDER TO BE MEDICALLY OPTIMIZED FOR DISCHARGE AND FOR SAFE D/C PLANNING. TEAM DISCUSSED OPTIONS OF RETURNING TO [facility] W/ OUTPATIENT HD , IF CARE NEEDS CAN BE SAFELY MET VS. NURSING HOME/[facility] . PATIENT AND BROTHER VERBALIZED UNDERSTANDING.     - SW AND MEDICAL TEAM HAVING ONGOING DISCUSSION WITH PATIENT REGARDING CONSISTENT COMPLIANCE IN ORDER TO RETURN TO HD IN THE OUTPATIENT SETTING  -- CONVEYED THAT PATIENT AND TEAM THAT PATIENT WILL NEED TO CONSISTENTLY COMPLETE FULL HD SESSIONS IN ORDER TO BE CONSIDERED BY OUTPATIENT HD AT NH W/ ONSITE HD OR OUTSIDE HD FACILITY    - PATIENT REQUIRING MORE FREQUENT WEEKLY HD SESSIONS    # FEVER X 1, SINCE AFEBRILE  # R/O INFECTION   # R/O COLITIS    - PATIENT REFUSING IV LINE PLACEMENT AND BLOOD WORK; RISKS OF DEFERRING DISCUSSED, PATIENT VERBALIZED UNDERSTANDING  - PLANNED FOR BLOOD CULTURE, CBC, CMP - WITH HEMODIALYSIS  - PLACED ON PO VANCOMYCIN  - ABD XRAY and CXRAY - W/OUT ACUTE FINDINGS  - ID CONSULT    # ACUTE ON CHRONIC HYPOXIC RESPIRATORY FAILURE S/T PULMONARY EDEMA - S/T INCOMPLETE HD SESSIONS ; ANASARCA  # HYPERKALEMIA  # HX OF COPD + S/P RECENT MULTIFOCAL PNEUMONIA ; R/O ASPIRATION PNEUMONIA  # PULMONARY HYPERTENSION  # UNCONTROLLED HTN  # ESRD ON HD TTS - W/ HX OF NONCOMPLIANCE    - TELEMETRY  - HYDRALAZINE, METOPROLOL AND NORVASC ; PRN IV ANTIHYPERTENSIVE  - LOKELMA  - SUPPLEMENTAL O2  - PLANNED FOR HD  - NEPHROLOGY CONSULT  - PULMONOLOGY CONSULT  - ID CONSULT    - CONTINUES TO REFUSE OR PRE-MATURELY DISCONTINUE SESSIONS OF HD       - [7/30] - OVERNIGHT RAPID RESPONSE S/T HYPOXIA - SELF-LIMITED - PATIENT IMPROVED S/P O2 SUPPLEMENT  - S/P CEFEPIME + FLAGYLL, BCX - NGTD      # RECURRENT INTRACTABLE NAUSEA/VOMITING, ? COFFEE GROUND EMESIS  # GASTROPARESIS  # HISTORY OF ESOPHAGITIS AND DUODENITIS [1/2021], HX OF GASTROPARESIS W/ EVIDENCE OF THICKENED ESOPHAGUS ON PREVIOUS CT SCAN   # PANCREAS W/ DOUBLE DUCT SIGN    - MONITORING HGB, PLACED ON PPI BID , CARAFATE QID  - PRN ANTIEMETICS  ; S/P DOSE OF REGLAN [WITH MINIMAL IMPROVEMENT]  - MONITORING FOR SYMPTOMS  - WHILE ON DIET PATIENT REPEATEDLY COUNSELLED TO CHEW FOOD CAUTIOUSLY AND CONSUME SMALL BITES W/ REGULAR CLEARING WITH WATER BETWEEN BITES    - ADVANCE DIET AS TOLERATED  - NOTED CT A/P    - S/P RECENT PRBC TRANSFUSION     - GI CONSULT  - SURGERY CONSULT    - [8/14] - EPISODE OF NAUSEA/VOMITING OVER THE WEEKEND - IMPROVED    # LESS LIKELY CHOLECYSTITIS  - S/P ABX  - NOTED CT A/P  - HIDA SCAN - NEGATIVE  - SURGERY CONSULT    # ELEVATED TROPONINS - ? DEMAND ISCHEMIA    - S/P TELEMETRY  - TREND TROPONINS  - ECHO - TRACE MR, MODERATELY INCREASED LV WALL THICKNESS, NORMAL LV SYSTOLIC FUNCTION, SEVERE PULMONARY HTN  - CARDIOLOGY CONSULT    # EPISODE OF HYPOGLYCEMIA - IMPROVED  # GASTROPARESIS  # UNDERLYING DM  - SSI + FS  - COUNSELLED TO COMPLY WITH HD TO REDUCE UREMIA    - REPEATEDLY COUNSELLED TO COMPLY WITH FINGERSTICKS      # DEPRESSION  - PLACED ON ZOLOFT  - PSYCHIATRY CONSULT    # PATIENT UNDERGOING OUTPATIENT WORKUP FOR CENTRAL VENOUS STENOSIS THROUGH NEPHROLOGY TEAM  - ? s/p STENT PLACEMENT    # ANEMIA OF CKD  # HX OF PANCYTOPENIA   - TREND HGB, TRANSFUSION THRESHOLD HGB < 7; PATIENT STILL INTERMITTENTLY REFUSING BLOODWORK, COUNSELLED TO COMPLY  - TYPE AND SCREEN  - ON EPO  - DENIES RECENT HEMATEMESIS, MELENA, HEMATOCHEZIA    - S/P RECENT PRBC TRANSFUSION  - S/P PRBC TRANSFUSION 7/29    - PPI BID, CARAFATE QID    # SEVERE PROTEIN CALORIE MALNUTRITION, FAILURE TO THRIVE   -  TEMPORAL WASTING, LOSS OF MUSCLE MASS FROM SHOULDER AND HIP GIRDLE  - NUTRITIONAL SUPPLEMENT    # HLD  # PARTIALLY BLIND  # S/P LEFT BKA  # HX OF PVD  # LS SPINAL STENOSIS  # GI AND DVT PPX   Patient is a 62y old  Male who presents with a chief complaint of Missed dialysis (24 Aug 2023 14:48)    PATIENT IS SEEN AND EXAMINED IN MEDICAL FLOOR.      ALLERGIES:  No Known Drug Allergies  fish (Rash)  liver (Anaphylaxis)      VITALS:    Vital Signs Last 24 Hrs  T(C): 36.7 (25 Aug 2023 04:33), Max: 36.7 (25 Aug 2023 04:33)  T(F): 98 (25 Aug 2023 04:33), Max: 98 (25 Aug 2023 04:33)  HR: 84 (25 Aug 2023 04:33) (71 - 84)  BP: 119/67 (25 Aug 2023 04:33) (100/61 - 126/80)  BP(mean): --  RR: 18 (25 Aug 2023 04:33) (18 - 21)  SpO2: 100% (25 Aug 2023 04:33) (99% - 100%)    Parameters below as of 25 Aug 2023 04:33  Patient On (Oxygen Delivery Method): room air        LABS:    CBC Full  -  ( 23 Aug 2023 10:30 )  WBC Count : 2.86 K/uL  RBC Count : 3.64 M/uL  Hemoglobin : 10.1 g/dL  Hematocrit : 31.9 %  Platelet Count - Automated : 85 K/uL  Mean Cell Volume : 87.6 fl  Mean Cell Hemoglobin : 27.7 pg  Mean Cell Hemoglobin Concentration : 31.7 gm/dL  Auto Neutrophil # : x  Auto Lymphocyte # : x  Auto Monocyte # : x  Auto Eosinophil # : x  Auto Basophil # : x  Auto Neutrophil % : x  Auto Lymphocyte % : x  Auto Monocyte % : x  Auto Eosinophil % : x  Auto Basophil % : x      08-23    122<L>  |  94<L>  |  57<H>  ----------------------------<  182<H>  5.4<H>   |  17<L>  |  10.70<H>    Ca    7.9<L>      23 Aug 2023 10:30  Phos  5.6     08-23  Mg     2.9     08-23    TPro  6.8  /  Alb  3.0<L>  /  TBili  0.4  /  DBili  x   /  AST  27  /  ALT  54  /  AlkPhos  103  08-23    CAPILLARY BLOOD GLUCOSE      POCT Blood Glucose.: 217 mg/dL (25 Aug 2023 08:23)  POCT Blood Glucose.: 189 mg/dL (24 Aug 2023 21:14)  POCT Blood Glucose.: 196 mg/dL (24 Aug 2023 16:38)  POCT Blood Glucose.: 233 mg/dL (24 Aug 2023 11:34)        LIVER FUNCTIONS - ( 23 Aug 2023 10:30 )  Alb: 3.0 g/dL / Pro: 6.8 g/dL / ALK PHOS: 103 U/L / ALT: 54 U/L DA / AST: 27 U/L / GGT: x           Creatinine Trend: 10.70<--, 12.00<--, 12.30<--, 10.70<--, 11.70<--, 12.90<--  I&O's Summary          .Blood Blood-Peripheral  05-28 @ 14:07   No Growth Final  --  --          MEDICATIONS:    MEDICATIONS  (STANDING):  albuterol    90 MICROgram(s) HFA Inhaler 2 Puff(s) Inhalation every 6 hours  amLODIPine   Tablet 10 milliGRAM(s) Oral daily  aspirin enteric coated 81 milliGRAM(s) Oral daily  atorvastatin 40 milliGRAM(s) Oral at bedtime  budesonide 160 MICROgram(s)/formoterol 4.5 MICROgram(s) Inhaler 2 Puff(s) Inhalation two times a day  chlorhexidine 2% Cloths 1 Application(s) Topical daily  dextrose 5%. 1000 milliLiter(s) (50 mL/Hr) IV Continuous <Continuous>  dextrose 5%. 1000 milliLiter(s) (100 mL/Hr) IV Continuous <Continuous>  dextrose 50% Injectable 25 Gram(s) IV Push once  dextrose 50% Injectable 12.5 Gram(s) IV Push once  dextrose 50% Injectable 25 Gram(s) IV Push once  epoetin beverley (PROCRIT) Injectable 68419 Unit(s) IV Push <User Schedule>  epoetin beverley-epbx (RETACRIT) Injectable 68131 Unit(s) IV Push <User Schedule>  folic acid 1 milliGRAM(s) Oral daily  furosemide    Tablet 80 milliGRAM(s) Oral daily  glucagon  Injectable 1 milliGRAM(s) IntraMuscular once  hydrALAZINE 25 milliGRAM(s) Oral three times a day  lactobacillus acidophilus 1 Tablet(s) Oral two times a day with meals  latanoprost 0.005% Ophthalmic Solution 1 Drop(s) Both EYES at bedtime  levothyroxine 25 MICROGram(s) Oral daily  lidocaine   4% Patch 2 Patch Transdermal daily  LORazepam     Tablet 2 milliGRAM(s) Oral <User Schedule>  melatonin 3 milliGRAM(s) Oral at bedtime  metoprolol succinate ER 50 milliGRAM(s) Oral daily  pantoprazole    Tablet 40 milliGRAM(s) Oral before breakfast  sertraline 200 milliGRAM(s) Oral daily  sevelamer carbonate 800 milliGRAM(s) Oral three times a day with meals  sucralfate 1 Gram(s) Oral four times a day  vancomycin    Solution 125 milliGRAM(s) Oral every 6 hours      MEDICATIONS  (PRN):  acetaminophen     Tablet .. 1000 milliGRAM(s) Oral every 8 hours PRN Moderate Pain (4 - 6)  dextrose Oral Gel 15 Gram(s) Oral once PRN Blood Glucose LESS THAN 70 milliGRAM(s)/deciliter  ondansetron   Disintegrating Tablet 4 milliGRAM(s) Oral two times a day PRN Nausea and/or Vomiting  ondansetron Injectable 4 milliGRAM(s) IV Push every 6 hours PRN Nausea and/or Vomiting      REVIEW OF SYSTEMS:                           ALL ROS DONE [ X   ]    CONSTITUTIONAL:  LETHARGIC [   ], FEVER [   ], UNRESPONSIVE [   ]  CVS:  CP  [   ], SOB, [   ], PALPITATIONS [   ], DIZZYNESS [   ]  RS: COUGH [   ], SPUTUM [   ]  GI: ABDOMINAL PAIN [   ], NAUSEA [   ], VOMITINGS [   ], DIARRHEA [   ], CONSTIPATION [   ]  :  DYSURIA [   ], NOCTURIA [   ], INCREASED FREQUENCY [   ], DRIBLING [   ],  SKELETAL: PAINFUL JOINTS [   ], SWOLLEN JOINTS [   ], NECK ACHE [   ], LOW BACK ACHE [   ],  SKIN : ULCERS [   ], RASH [   ], ITCHING [   ]  CNS: HEAD ACHE [   ], DOUBLE VISION [   ], BLURRED VISION [   ], AMS / CONFUSION [   ], SEIZURES [   ], WEAKNESS [   ],TINGLING / NUMBNESS [   ]      PHYSICAL EXAM:    GENERAL APPEARANCE: NO DISTRESS,    ANASARCA ++ [IMPROVED]  HEENT:  NO PALLOR, NO  JVD,  NO   NODES, NECK SUPPLE  CVS: S1 +, S2 +,   RS: AEEB,  OCCASIONAL  RALES +,   CRACKLES+ AT LUNG BASES [IMPROVED]  ABD: SOFT, NT, NO, BS +  EXT: LEFT BKA  SKIN: WARM,   SKELETAL:  ROM ACCEPTABLE  CNS:  AAO X  2-3        RADIOLOGY :    RADIOLOGY AND READINGS REVIEWED      ASSESSMENT :     Hypervolemia    Hypertension    Adrenal insufficiency    CKD (chronic kidney disease)    Anemia    Glaucoma    Coronary artery disease    HLD (hyperlipidemia)    Peripheral vascular disease    Spinal stenosis of lumbosacral region    Hyperparathyroidism    Diabetes mellitus    Diabetic neuropathy    Contracture of hand    Osteoarthritis    Osteoporosis    Vision loss of left eye    ESRD on hemodialysis    Cataract    BPH (benign prostatic hyperplasia)    UTI (urinary tract infection)    Bladder mass    H/O hematuria    Osteoporosis    Vision loss of right eye    Depression    Chronic GERD    Osteomyelitis of vertebra    CHF (congestive heart failure)    Below knee amputation status, left    History of right cataract extraction    History of left cataract extraction    S/P arteriovenous (AV) fistula creation    H/O hematuria    H/O transurethral destruction of bladder lesion    History of excision of mass        PLAN:  HPI:  Patient is 62 year old male from [facility], walks with RW, with PMH of ESRD on HD (TTS), BKA- L w/prosthetic leg, DM, Left eye blindness, CHF, CAD, HTN, HLD, osteoporosis, bronchitis, current smoker, and anemia who presents with complaint of missed dialysis. Last HD 7/22. Pt states he often missed HD sessions.  patient states he missed this HD session due to mild pain in left leg, patient remains able to ambulate. Pt complains of right leg swelling and states he does not make any urine. Patient states he was having mild shortness of breath.  Pt denies any fever, chills, N/V/D, constipation, CP, numbness or tingling (26 Jul 2023 19:20)    # [8/9] MEETING HELD WITH PATIENT, BROTHER ARTEMIO @ [phone] AND  TEAM. PATIENT W/ HISTORY OF ROUTINE NONCOMPLIANCE W/ LIFE-SUSTAINING TREATMENT [HD], EVALUATIONS AND INTERVENTIONS - COUNSELLED AT LENGTH TO REMAIN COMPLIANT. DISCUSSED THAT PROGNOSIS IS POOR/GRIM GIVEN UNDERLYING MEDICAL CONDITIONS AND GIVEN NONCOMPLIANCE. HE VERBALIZED UNDERSTANDING. REGARDING GOC - PATIENT WISHES FOR FULL CODE. PALLIATIVE CARE CONSULTED. PSYCHIATRY CONSULTED. PATIENT EXPRESSED THAT HE DOES GROW IMPATIENT DURING DIALYSIS SESSIONS AND HAS BEEN LEAVING SESSIONS SOONER THAN PRESCRIBED. IN DISCUSSION THAT RISKS OF DEFERRING COMPLETE HD - INCLUDE BUT ARE NOT LIMITED TO WORSENING CLINICAL CONDITION, ELECTROLYTE ABNORMALITIES, ARRHYTHMIA AND DEATH. HE VERBALIZED UNDERSTANDING. D/W PATIENT THAT REPEATEDLY REFUSING INTERVENTIONS AND ASSESSMENTS IS NOT IN LINE WITH HIS WISHES TO IMPROVE. PATIENT VERBALIZED UNDERSTANDING. DISCUSSED REGARDING CURRENT CLINICAL CONDITION, RECOMMENDED EVALUATIONS AND INTERVENTIONS. ALL QUESTIONS ANSWERED. TEAM HAS OFFERED PATIENT EMOTIONAL SUPPORT AND OFFERED MEDICATION FOR MOOD. OFFERED TO PRE-MEDICATE W/ ANXIOLYTIC PRIOR TO HD. PATIENT IS AGREEABLE.     DISCUSSED THAT PATIENT WILL NEED TO COMPLY WITH HD SESSIONS IN ORDER TO BE MEDICALLY OPTIMIZED FOR DISCHARGE AND FOR SAFE D/C PLANNING. TEAM DISCUSSED OPTIONS OF RETURNING TO [facility] W/ OUTPATIENT HD , IF CARE NEEDS CAN BE SAFELY MET VS. NURSING HOME/[facility] . PATIENT AND BROTHER VERBALIZED UNDERSTANDING.     - SW AND MEDICAL TEAM HAVING ONGOING DISCUSSION WITH PATIENT REGARDING CONSISTENT COMPLIANCE IN ORDER TO RETURN TO HD IN THE OUTPATIENT SETTING  -- CONVEYED THAT PATIENT AND TEAM THAT PATIENT WILL NEED TO CONSISTENTLY COMPLETE FULL HD SESSIONS IN ORDER TO BE CONSIDERED BY OUTPATIENT HD AT NH W/ ONSITE HD OR OUTSIDE HD FACILITY    - PATIENT REQUIRING MORE FREQUENT WEEKLY HD SESSIONS    # DIARRHEA  # R/O INFECTION   # R/O COLITIS    - PATIENT REFUSING IV LINE PLACEMENT AND BLOOD WORK; RISKS OF DEFERRING DISCUSSED, PATIENT VERBALIZED UNDERSTANDING  - PLANNED FOR BLOOD CULTURE, CBC, CMP - WITH HEMODIALYSIS  - PLACED ON PO VANCOMYCIN X 10 DAYS  - ABD XRAY and CXRAY - W/OUT ACUTE FINDINGS  - ID CONSULT    # ACUTE ON CHRONIC HYPOXIC RESPIRATORY FAILURE S/T PULMONARY EDEMA - S/T INCOMPLETE HD SESSIONS ; ANASARCA  # HYPERKALEMIA  # HX OF COPD + S/P RECENT MULTIFOCAL PNEUMONIA ; R/O ASPIRATION PNEUMONIA  # PULMONARY HYPERTENSION  # UNCONTROLLED HTN  # ESRD ON HD TTS - W/ HX OF NONCOMPLIANCE    - TELEMETRY  - HYDRALAZINE, METOPROLOL AND NORVASC ; PRN IV ANTIHYPERTENSIVE  - LOKELMA  - SUPPLEMENTAL O2  - PLANNED FOR HD  - NEPHROLOGY CONSULT  - PULMONOLOGY CONSULT  - ID CONSULT    - CONTINUES TO REFUSE OR PRE-MATURELY DISCONTINUE SESSIONS OF HD       - [7/30] - OVERNIGHT RAPID RESPONSE S/T HYPOXIA - SELF-LIMITED - PATIENT IMPROVED S/P O2 SUPPLEMENT  - S/P CEFEPIME + FLAGYLL, BCX - NGTD      # RECURRENT INTRACTABLE NAUSEA/VOMITING, ? COFFEE GROUND EMESIS  # GASTROPARESIS  # HISTORY OF ESOPHAGITIS AND DUODENITIS [1/2021], HX OF GASTROPARESIS W/ EVIDENCE OF THICKENED ESOPHAGUS ON PREVIOUS CT SCAN   # PANCREAS W/ DOUBLE DUCT SIGN    - MONITORING HGB, PLACED ON PPI BID , CARAFATE QID  - PRN ANTIEMETICS  ; S/P DOSE OF REGLAN [WITH MINIMAL IMPROVEMENT]  - MONITORING FOR SYMPTOMS  - WHILE ON DIET PATIENT REPEATEDLY COUNSELLED TO CHEW FOOD CAUTIOUSLY AND CONSUME SMALL BITES W/ REGULAR CLEARING WITH WATER BETWEEN BITES    - ADVANCE DIET AS TOLERATED  - NOTED CT A/P    - S/P RECENT PRBC TRANSFUSION     - GI CONSULT  - SURGERY CONSULT    - [8/14] - EPISODE OF NAUSEA/VOMITING OVER THE WEEKEND - IMPROVED    # LESS LIKELY CHOLECYSTITIS  - S/P ABX  - NOTED CT A/P  - HIDA SCAN - NEGATIVE  - SURGERY CONSULT    # ELEVATED TROPONINS - ? DEMAND ISCHEMIA    - S/P TELEMETRY  - TREND TROPONINS  - ECHO - TRACE MR, MODERATELY INCREASED LV WALL THICKNESS, NORMAL LV SYSTOLIC FUNCTION, SEVERE PULMONARY HTN  - CARDIOLOGY CONSULT    # EPISODE OF HYPOGLYCEMIA - IMPROVED  # GASTROPARESIS  # UNDERLYING DM  - SSI + FS  - COUNSELLED TO COMPLY WITH HD TO REDUCE UREMIA    - REPEATEDLY COUNSELLED TO COMPLY WITH FINGERSTICKS      # DEPRESSION  - PLACED ON ZOLOFT  - PSYCHIATRY CONSULT    # PATIENT UNDERGOING OUTPATIENT WORKUP FOR CENTRAL VENOUS STENOSIS THROUGH NEPHROLOGY TEAM  - ? s/p STENT PLACEMENT    # ANEMIA OF CKD  # HX OF PANCYTOPENIA   - TREND HGB, TRANSFUSION THRESHOLD HGB < 7; PATIENT STILL INTERMITTENTLY REFUSING BLOODWORK, COUNSELLED TO COMPLY  - TYPE AND SCREEN  - ON EPO  - DENIES RECENT HEMATEMESIS, MELENA, HEMATOCHEZIA    - S/P RECENT PRBC TRANSFUSION  - S/P PRBC TRANSFUSION 7/29    - PPI BID, CARAFATE QID    # SEVERE PROTEIN CALORIE MALNUTRITION, FAILURE TO THRIVE   -  TEMPORAL WASTING, LOSS OF MUSCLE MASS FROM SHOULDER AND HIP GIRDLE  - NUTRITIONAL SUPPLEMENT    # HLD  # PARTIALLY BLIND  # S/P LEFT BKA  # HX OF PVD  # LS SPINAL STENOSIS  # GI AND DVT PPX

## 2023-08-26 NOTE — PROGRESS NOTE ADULT - ASSESSMENT
A/P  ESRD ON  HD    WAS  DIALYZED  YESTERDAY    DID COMPLETE HIS 3  HRS       HAS NO  VOL  EXCESS     BP IS  ALSO  BETTER     HB  BETTER ON  AYAN  WITH HD     HAS BEEN  NON  COMPLIANT WITH HIS   DIALYSIS HOURS AND TREATMENT    [facility]

## 2023-08-26 NOTE — PROGRESS NOTE ADULT - SUBJECTIVE AND OBJECTIVE BOX
Patient is a 62y Male with  ESRD ON  HD    SEEN  TODAY    HAS NO  COMPLAINTS AT THE TIME  OF  EXAM    NO  ACUTE EVENTS OVERNIGHT    No Known Drug Allergies  fish (Rash)  liver (Anaphylaxis)    Hospital Medications:   MEDICATIONS  (STANDING):  albuterol    90 MICROgram(s) HFA Inhaler 2 Puff(s) Inhalation every 6 hours  amLODIPine   Tablet 10 milliGRAM(s) Oral daily  aspirin enteric coated 81 milliGRAM(s) Oral daily  atorvastatin 40 milliGRAM(s) Oral at bedtime  budesonide 160 MICROgram(s)/formoterol 4.5 MICROgram(s) Inhaler 2 Puff(s) Inhalation two times a day  chlorhexidine 2% Cloths 1 Application(s) Topical daily  dextrose 5%. 1000 milliLiter(s) (50 mL/Hr) IV Continuous <Continuous>  dextrose 5%. 1000 milliLiter(s) (100 mL/Hr) IV Continuous <Continuous>  dextrose 50% Injectable 25 Gram(s) IV Push once  dextrose 50% Injectable 12.5 Gram(s) IV Push once  dextrose 50% Injectable 25 Gram(s) IV Push once  epoetin beverley (PROCRIT) Injectable 71752 Unit(s) IV Push <User Schedule>  epoetin beverley-epbx (RETACRIT) Injectable 27632 Unit(s) IV Push <User Schedule>  folic acid 1 milliGRAM(s) Oral daily  furosemide    Tablet 80 milliGRAM(s) Oral daily  glucagon  Injectable 1 milliGRAM(s) IntraMuscular once  hydrALAZINE 25 milliGRAM(s) Oral three times a day  lactobacillus acidophilus 1 Tablet(s) Oral two times a day with meals  latanoprost 0.005% Ophthalmic Solution 1 Drop(s) Both EYES at bedtime  levothyroxine 25 MICROGram(s) Oral daily  lidocaine   4% Patch 2 Patch Transdermal daily  LORazepam     Tablet 2 milliGRAM(s) Oral <User Schedule>  melatonin 3 milliGRAM(s) Oral at bedtime  metoprolol succinate ER 50 milliGRAM(s) Oral daily  pantoprazole    Tablet 40 milliGRAM(s) Oral before breakfast  sertraline 200 milliGRAM(s) Oral daily  sevelamer carbonate 800 milliGRAM(s) Oral three times a day with meals  sucralfate 1 Gram(s) Oral four times a day  vancomycin    Solution 125 milliGRAM(s) Oral every 6 hours    REVIEW OF SYSTEMS:  HAS NO   FEVER  CHILLS   NO   SOB  OR  COUGH   NO CH  PAIN  / PALPITATIONS   APPETITE IS  GOOD, NO  N/V  OR  ABD  PAIN  HAS DIARRHEA  ON  AND OFF  HAS LITTLE  URINE OUT PUT   NO LEG  SWELLING R  LEG      VITALS:  T(F): 98.6 (08-26-23 @ 12:06), Max: 99 (08-25-23 @ 20:35)  HR: 88 (08-26-23 @ 12:06)  BP: 128/65 (08-26-23 @ 12:06)  RR: 16 (08-26-23 @ 12:06)  SpO2: 100% (08-26-23 @ 12:06)  Wt(kg): --      PHYSICAL EXAM:  Constitutional: NAD  HEENT:  CONJ  PINK  Neck: No JVD  Respiratory: CTAB, NO  CRACKLES  Cardiovascular: S1, S2, RRR  Gastrointestinal: BS+, soft, NT/ND  Extremities:  No peripheral edema R  LEG   L  HAS PROSTHESIS ON  Neurological: A/O x 3,  :  No [place].     Vascular Access: AVG   R  UPPER ARM,  WORKING WELL    LABS:  08-25    129<L>  |  96  |  36<H>  ----------------------------<  251<H>  4.6   |  22  |  8.50<H>    Ca    7.8<L>      25 Aug 2023 10:15      Creatinine Trend: 8.50 <--, 10.70 <--, 12.00 <--                        9.7    2.08  )-----------( 97       ( 25 Aug 2023 10:15 )             31.0     Urine Studies:  Urinalysis Basic - ( 25 Aug 2023 10:15 )    Color:  / Appearance:  / SG:  / pH:   Gluc: 251 mg/dL / Ketone:   / Bili:  / Urobili:    Blood:  / Protein:  / Nitrite:    Leuk Esterase:  / RBC:  / WBC    Sq Epi:  / Non Sq Epi:  / Bacteria:         RADIOLOGY & ADDITIONAL STUDIES:

## 2023-08-26 NOTE — PROGRESS NOTE ADULT - SUBJECTIVE AND OBJECTIVE BOX
62y Male    Meds:  vancomycin    Solution 125 milliGRAM(s) Oral every 6 hours    Allergies    No Known Drug Allergies  fish (Rash)  liver (Anaphylaxis)    Intolerances        VITALS:  Vital Signs Last 24 Hrs  T(C): 37 (26 Aug 2023 12:06), Max: 37.2 (25 Aug 2023 20:35)  T(F): 98.6 (26 Aug 2023 12:06), Max: 99 (25 Aug 2023 20:35)  HR: 88 (26 Aug 2023 12:06) (84 - 90)  BP: 128/65 (26 Aug 2023 12:06) (128/65 - 161/77)  BP(mean): --  RR: 16 (26 Aug 2023 12:06) (16 - 18)  SpO2: 100% (26 Aug 2023 12:06) (95% - 100%)    Parameters below as of 26 Aug 2023 12:06  Patient On (Oxygen Delivery Method): room air        LABS/DIAGNOSTIC TESTS:                          9.7    2.08  )-----------( 97       ( 25 Aug 2023 10:15 )             31.0         08-25    129<L>  |  96  |  36<H>  ----------------------------<  251<H>  4.6   |  22  |  8.50<H>    Ca    7.8<L>      25 Aug 2023 10:15            CULTURES:       RADIOLOGY:      ROS:  [  ] UNABLE TO ELICIT 62y Male who is doing very well , he has no fevers or diarrhea , he is afebrile and denies any abdominal pain, nausea or vomiting. He is likely going to be discharged back to his assisted living facility on Monday.    Meds:  vancomycin    Solution 125 milliGRAM(s) Oral every 6 hours    Allergies    No Known Drug Allergies  fish (Rash)  liver (Anaphylaxis)    Intolerances        VITALS:  Vital Signs Last 24 Hrs  T(C): 37 (26 Aug 2023 12:06), Max: 37.2 (25 Aug 2023 20:35)  T(F): 98.6 (26 Aug 2023 12:06), Max: 99 (25 Aug 2023 20:35)  HR: 88 (26 Aug 2023 12:06) (84 - 90)  BP: 128/65 (26 Aug 2023 12:06) (128/65 - 161/77)  BP(mean): --  RR: 16 (26 Aug 2023 12:06) (16 - 18)  SpO2: 100% (26 Aug 2023 12:06) (95% - 100%)    Parameters below as of 26 Aug 2023 12:06  Patient On (Oxygen Delivery Method): room air        LABS/DIAGNOSTIC TESTS:                          9.7    2.08  )-----------( 97       ( 25 Aug 2023 10:15 )             31.0         08-25    129<L>  |  96  |  36<H>  ----------------------------<  251<H>  4.6   |  22  |  8.50<H>    Ca    7.8<L>      25 Aug 2023 10:15            CULTURES:       RADIOLOGY:      ROS:  [  ] UNABLE TO ELICIT

## 2023-08-26 NOTE — PROGRESS NOTE ADULT - RESPIRATORY
normal/clear to auscultation bilaterally/no wheezes/no rales/no rhonchi
clear to auscultation bilaterally/no wheezes/no rales/no rhonchi
clear to auscultation bilaterally/no wheezes/no rales/no rhonchi

## 2023-08-26 NOTE — PROGRESS NOTE ADULT - SUBJECTIVE AND OBJECTIVE BOX
Time of Visit:  Patient seen and examined. observed pat ambulating with walker in room     MEDICATIONS  (STANDING):  albuterol    90 MICROgram(s) HFA Inhaler 2 Puff(s) Inhalation every 6 hours  amLODIPine   Tablet 10 milliGRAM(s) Oral daily  aspirin enteric coated 81 milliGRAM(s) Oral daily  atorvastatin 40 milliGRAM(s) Oral at bedtime  budesonide 160 MICROgram(s)/formoterol 4.5 MICROgram(s) Inhaler 2 Puff(s) Inhalation two times a day  chlorhexidine 2% Cloths 1 Application(s) Topical daily  dextrose 5%. 1000 milliLiter(s) (50 mL/Hr) IV Continuous <Continuous>  dextrose 5%. 1000 milliLiter(s) (100 mL/Hr) IV Continuous <Continuous>  dextrose 50% Injectable 25 Gram(s) IV Push once  dextrose 50% Injectable 12.5 Gram(s) IV Push once  dextrose 50% Injectable 25 Gram(s) IV Push once  epoetin beverley (PROCRIT) Injectable 15304 Unit(s) IV Push <User Schedule>  epoetin beverley-epbx (RETACRIT) Injectable 98403 Unit(s) IV Push <User Schedule>  folic acid 1 milliGRAM(s) Oral daily  furosemide    Tablet 80 milliGRAM(s) Oral daily  glucagon  Injectable 1 milliGRAM(s) IntraMuscular once  hydrALAZINE 25 milliGRAM(s) Oral three times a day  lactobacillus acidophilus 1 Tablet(s) Oral two times a day with meals  latanoprost 0.005% Ophthalmic Solution 1 Drop(s) Both EYES at bedtime  levothyroxine 25 MICROGram(s) Oral daily  lidocaine   4% Patch 2 Patch Transdermal daily  LORazepam     Tablet 2 milliGRAM(s) Oral <User Schedule>  melatonin 3 milliGRAM(s) Oral at bedtime  metoprolol succinate ER 50 milliGRAM(s) Oral daily  pantoprazole    Tablet 40 milliGRAM(s) Oral before breakfast  sertraline 200 milliGRAM(s) Oral daily  sevelamer carbonate 800 milliGRAM(s) Oral three times a day with meals  sucralfate 1 Gram(s) Oral four times a day  vancomycin    Solution 125 milliGRAM(s) Oral every 6 hours      MEDICATIONS  (PRN):  acetaminophen     Tablet .. 1000 milliGRAM(s) Oral every 8 hours PRN Moderate Pain (4 - 6)  dextrose Oral Gel 15 Gram(s) Oral once PRN Blood Glucose LESS THAN 70 milliGRAM(s)/deciliter  ondansetron   Disintegrating Tablet 4 milliGRAM(s) Oral two times a day PRN Nausea and/or Vomiting  ondansetron Injectable 4 milliGRAM(s) IV Push every 6 hours PRN Nausea and/or Vomiting       Medications up to date at time of exam.      PHYSICAL EXAMINATION:  Patient has no new complaints.  GENERAL: The patient is a well-developed, well-nourished, in no apparent distress.     Vital Signs Last 24 Hrs  T(C): 37 (26 Aug 2023 12:06), Max: 37.2 (25 Aug 2023 20:35)  T(F): 98.6 (26 Aug 2023 12:06), Max: 99 (25 Aug 2023 20:35)  HR: 88 (26 Aug 2023 12:06) (84 - 90)  BP: 128/65 (26 Aug 2023 12:06) (128/65 - 161/77)  BP(mean): --  RR: 16 (26 Aug 2023 12:06) (16 - 18)  SpO2: 100% (26 Aug 2023 12:06) (95% - 100%)    Parameters below as of 26 Aug 2023 12:06  Patient On (Oxygen Delivery Method): room air       (if applicable)    Chest Tube (if applicable)    HEENT: Head is normocephalic and atraumatic. Extraocular muscles are intact. Mucous membranes are moist.     NECK: Supple, no palpable adenopathy.    LUNGS: Clear to auscultation, no wheezing, rales, or rhonchi.    HEART: Regular rate and rhythm without murmur.    ABDOMEN: Soft, nontender, and nondistended.  No hepatosplenomegaly is noted.    : No painful voiding, no pelvic pain    EXTREMITIES:     NEUROLOGIC: Awake, alert, oriented, grossly intact    SKIN: Warm, dry, good turgor.      LABS:                        9.7    2.08  )-----------( 97       ( 25 Aug 2023 10:15 )             31.0     08-25    129<L>  |  96  |  36<H>  ----------------------------<  251<H>  4.6   |  22  |  8.50<H>    Ca    7.8<L>      25 Aug 2023 10:15        Urinalysis Basic - ( 25 Aug 2023 10:15 )    Color: x / Appearance: x / SG: x / pH: x  Gluc: 251 mg/dL / Ketone: x  / Bili: x / Urobili: x   Blood: x / Protein: x / Nitrite: x   Leuk Esterase: x / RBC: x / WBC x   Sq Epi: x / Non Sq Epi: x / Bacteria: x                      MICROBIOLOGY: (if applicable)    RADIOLOGY & ADDITIONAL STUDIES:  EKG:   CXR:  ECHO:    IMPRESSION: 62y Male 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      Osteoarthritis      Vision loss of left eye  blind      ESRD on hemodialysis  T/Th/S      Cataract  both eyes - hx of sx done      BPH (benign prostatic hyperplasia)      UTI (urinary tract infection)  hx of      Bladder mass  hx of      H/O hematuria      Osteoporosis      Vision loss of right eye  decreased      Depression      Chronic GERD      Osteomyelitis of vertebra      CHF (congestive heart failure)      Below knee amputation status, left  2012- pt is wearing prostesis      History of right cataract extraction      History of left cataract extraction      S/P arteriovenous (AV) fistula creation  right arm brachiocephalic arteriovenous fistula on 11/08/2018      H/O hematuria  s/p bladder bx and fulguration 2/25/2020      H/O transurethral destruction of bladder lesion  2020      History of excision of mass  back mass on 03/31/2021       p/w            Impression: This is a 62 yr old man  from [facility] with ESRD on HD ( T-Th-Sat ). Current smoker . Presented to ED due to Missed Dialysis, last HD 07-22-23 . Per Patient stated that he often missed HD sessions due to Mild left leg pain and right leg swelling . S/p RRT for SOB with Hypoxia due to Acute Hypoxic Respiratory Failure secondary to Pulmonary edema/ Fluid overload due to Missed Dialysis . CT Abdomen with Small Bilateral Pleural Effusions, Rt Lower Lung with groundglass opacity. No bowel obstruction. Small Gall Stone.   Vomiting due to diabetic gastroparesis vs acute cholecystitis . Had a HAP with s/p Cefepime and Flagyl .       Suggestions:  - O2 saturation 98 % room air. So far been saturating good room air.   - Neph suggest premedicate with ativan before HD .  - Limit fluid intake .  - Continue Albuterol 90 mcg 2 puffs Q 6 Hours.   - Continue Symbicort 160-4.5 mcg 2 Puffs Twice Daily .   - Reinforced the importance of compliance to Dialysis schedule.      Time of Visit:  Patient seen and examined. observed pat ambulating with walker in room     MEDICATIONS  (STANDING):  albuterol    90 MICROgram(s) HFA Inhaler 2 Puff(s) Inhalation every 6 hours  amLODIPine   Tablet 10 milliGRAM(s) Oral daily  aspirin enteric coated 81 milliGRAM(s) Oral daily  atorvastatin 40 milliGRAM(s) Oral at bedtime  budesonide 160 MICROgram(s)/formoterol 4.5 MICROgram(s) Inhaler 2 Puff(s) Inhalation two times a day  chlorhexidine 2% Cloths 1 Application(s) Topical daily  dextrose 5%. 1000 milliLiter(s) (50 mL/Hr) IV Continuous <Continuous>  dextrose 5%. 1000 milliLiter(s) (100 mL/Hr) IV Continuous <Continuous>  dextrose 50% Injectable 25 Gram(s) IV Push once  dextrose 50% Injectable 12.5 Gram(s) IV Push once  dextrose 50% Injectable 25 Gram(s) IV Push once  epoetin beverley (PROCRIT) Injectable 08121 Unit(s) IV Push <User Schedule>  epoetin beverley-epbx (RETACRIT) Injectable 09195 Unit(s) IV Push <User Schedule>  folic acid 1 milliGRAM(s) Oral daily  furosemide    Tablet 80 milliGRAM(s) Oral daily  glucagon  Injectable 1 milliGRAM(s) IntraMuscular once  hydrALAZINE 25 milliGRAM(s) Oral three times a day  lactobacillus acidophilus 1 Tablet(s) Oral two times a day with meals  latanoprost 0.005% Ophthalmic Solution 1 Drop(s) Both EYES at bedtime  levothyroxine 25 MICROGram(s) Oral daily  lidocaine   4% Patch 2 Patch Transdermal daily  LORazepam     Tablet 2 milliGRAM(s) Oral <User Schedule>  melatonin 3 milliGRAM(s) Oral at bedtime  metoprolol succinate ER 50 milliGRAM(s) Oral daily  pantoprazole    Tablet 40 milliGRAM(s) Oral before breakfast  sertraline 200 milliGRAM(s) Oral daily  sevelamer carbonate 800 milliGRAM(s) Oral three times a day with meals  sucralfate 1 Gram(s) Oral four times a day  vancomycin    Solution 125 milliGRAM(s) Oral every 6 hours      MEDICATIONS  (PRN):  acetaminophen     Tablet .. 1000 milliGRAM(s) Oral every 8 hours PRN Moderate Pain (4 - 6)  dextrose Oral Gel 15 Gram(s) Oral once PRN Blood Glucose LESS THAN 70 milliGRAM(s)/deciliter  ondansetron   Disintegrating Tablet 4 milliGRAM(s) Oral two times a day PRN Nausea and/or Vomiting  ondansetron Injectable 4 milliGRAM(s) IV Push every 6 hours PRN Nausea and/or Vomiting       Medications up to date at time of exam.      PHYSICAL EXAMINATION:  Patient has no new complaints.  GENERAL: The patient is a well-developed, well-nourished, in no apparent distress.     Vital Signs Last 24 Hrs  T(C): 37 (26 Aug 2023 12:06), Max: 37.2 (25 Aug 2023 20:35)  T(F): 98.6 (26 Aug 2023 12:06), Max: 99 (25 Aug 2023 20:35)  HR: 88 (26 Aug 2023 12:06) (84 - 90)  BP: 128/65 (26 Aug 2023 12:06) (128/65 - 161/77)  BP(mean): --  RR: 16 (26 Aug 2023 12:06) (16 - 18)  SpO2: 100% (26 Aug 2023 12:06) (95% - 100%)    Parameters below as of 26 Aug 2023 12:06  Patient On (Oxygen Delivery Method): room air       (if applicable)    Chest Tube (if applicable)    HEENT: Head is normocephalic and atraumatic. Extraocular muscles are intact. Mucous membranes are moist.     NECK: Supple, no palpable adenopathy.    LUNGS: Clear to auscultation, no wheezing, rales, or rhonchi.    HEART: Regular rate and rhythm without murmur.    ABDOMEN: Soft, nontender, and nondistended.  No hepatosplenomegaly is noted.    : No painful voiding, no pelvic pain    EXTREMITIES: L BKA    NEUROLOGIC: Awake, alert, oriented, grossly intact    SKIN: Warm, dry, good turgor.      LABS:                        9.7    2.08  )-----------( 97       ( 25 Aug 2023 10:15 )             31.0     08-25    129<L>  |  96  |  36<H>  ----------------------------<  251<H>  4.6   |  22  |  8.50<H>    Ca    7.8<L>      25 Aug 2023 10:15        Urinalysis Basic - ( 25 Aug 2023 10:15 )    Color: x / Appearance: x / SG: x / pH: x  Gluc: 251 mg/dL / Ketone: x  / Bili: x / Urobili: x   Blood: x / Protein: x / Nitrite: x   Leuk Esterase: x / RBC: x / WBC x   Sq Epi: x / Non Sq Epi: x / Bacteria: x                      MICROBIOLOGY: (if applicable)    RADIOLOGY & ADDITIONAL STUDIES:  EKG:   CXR:  ECHO:    IMPRESSION: 62y Male 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      Osteoarthritis      Vision loss of left eye  blind      ESRD on hemodialysis  T/Th/S      Cataract  both eyes - hx of sx done      BPH (benign prostatic hyperplasia)      UTI (urinary tract infection)  hx of      Bladder mass  hx of      H/O hematuria      Osteoporosis      Vision loss of right eye  decreased      Depression      Chronic GERD      Osteomyelitis of vertebra      CHF (congestive heart failure)      Below knee amputation status, left  2012- pt is wearing prostesis      History of right cataract extraction      History of left cataract extraction      S/P arteriovenous (AV) fistula creation  right arm brachiocephalic arteriovenous fistula on 11/08/2018      H/O hematuria  s/p bladder bx and fulguration 2/25/2020      H/O transurethral destruction of bladder lesion  2020      History of excision of mass  back mass on 03/31/2021       p/w            Impression: This is a 62 yr old man  from [facility] with ESRD on HD ( T-Th-Sat ). Current smoker . Presented to ED due to Missed Dialysis, last HD 07-22-23 . Per Patient stated that he often missed HD sessions due to Mild left leg pain and right leg swelling . S/p RRT for SOB with Hypoxia due to Acute Hypoxic Respiratory Failure secondary to Pulmonary edema/ Fluid overload due to Missed Dialysis . CT Abdomen with Small Bilateral Pleural Effusions, Rt Lower Lung with groundglass opacity. No bowel obstruction. Small Gall Stone.   Vomiting due to diabetic gastroparesis vs acute cholecystitis . Had a HAP with s/p Cefepime and Flagyl .       Suggestions:  - O2 saturation 98 % room air. So far been saturating good room air.   - Neph suggest premedicate with ativan before HD .  - Limit fluid intake .  - Continue Albuterol 90 mcg 2 puffs Q 6 Hours.   - Continue Symbicort 160-4.5 mcg 2 Puffs Twice Daily .   - Reinforced the importance of compliance to Dialysis schedule.

## 2023-08-26 NOTE — PROGRESS NOTE ADULT - SUBJECTIVE AND OBJECTIVE BOX
Patient is a 62y old  Male who presents with a chief complaint of Missed dialysis (25 Aug 2023 10:34)    PATIENT IS SEEN AND EXAMINED IN MEDICAL FLOOR.    MARIIT [    ]    JEREMY [   ]      GT [   ]    ALLERGIES:  No Known Drug Allergies  fish (Rash)  liver (Anaphylaxis)      Daily     Daily Weight in k.5 (26 Aug 2023 05:35)    VITALS:    Vital Signs Last 24 Hrs  T(C): 36.8 (26 Aug 2023 05:35), Max: 37.2 (25 Aug 2023 20:35)  T(F): 98.2 (26 Aug 2023 05:35), Max: 99 (25 Aug 2023 20:35)  HR: 84 (26 Aug 2023 05:35) (81 - 90)  BP: 161/77 (26 Aug 2023 05:35) (131/55 - 161/77)  BP(mean): --  RR: 18 (26 Aug 2023 05:35) (17 - 18)  SpO2: 95% (26 Aug 2023 05:35) (95% - 99%)    Parameters below as of 26 Aug 2023 05:35  Patient On (Oxygen Delivery Method): room air        LABS:    CBC Full  -  ( 25 Aug 2023 10:15 )  WBC Count : 2.08 K/uL  RBC Count : 3.51 M/uL  Hemoglobin : 9.7 g/dL  Hematocrit : 31.0 %  Platelet Count - Automated : 97 K/uL  Mean Cell Volume : 88.3 fl  Mean Cell Hemoglobin : 27.6 pg  Mean Cell Hemoglobin Concentration : 31.3 gm/dL  Auto Neutrophil # : x  Auto Lymphocyte # : x  Auto Monocyte # : x  Auto Eosinophil # : x  Auto Basophil # : x  Auto Neutrophil % : x  Auto Lymphocyte % : x  Auto Monocyte % : x  Auto Eosinophil % : x  Auto Basophil % : x      08-    129<L>  |  96  |  36<H>  ----------------------------<  251<H>  4.6   |  22  |  8.50<H>    Ca    7.8<L>      25 Aug 2023 10:15      CAPILLARY BLOOD GLUCOSE      POCT Blood Glucose.: 179 mg/dL (26 Aug 2023 07:52)  POCT Blood Glucose.: 262 mg/dL (25 Aug 2023 20:54)  POCT Blood Glucose.: 284 mg/dL (25 Aug 2023 16:49)  POCT Blood Glucose.: 161 mg/dL (25 Aug 2023 12:05)          Creatinine Trend: 8.50<--, 10.70<--, 12.00<--, 12.30<--, 10.70<--, 11.70<--  I&O's Summary          .Blood Blood-Peripheral   @ 14:07   No Growth Final  --  --          MEDICATIONS:    MEDICATIONS  (STANDING):  albuterol    90 MICROgram(s) HFA Inhaler 2 Puff(s) Inhalation every 6 hours  amLODIPine   Tablet 10 milliGRAM(s) Oral daily  aspirin enteric coated 81 milliGRAM(s) Oral daily  atorvastatin 40 milliGRAM(s) Oral at bedtime  budesonide 160 MICROgram(s)/formoterol 4.5 MICROgram(s) Inhaler 2 Puff(s) Inhalation two times a day  chlorhexidine 2% Cloths 1 Application(s) Topical daily  dextrose 5%. 1000 milliLiter(s) (50 mL/Hr) IV Continuous <Continuous>  dextrose 5%. 1000 milliLiter(s) (100 mL/Hr) IV Continuous <Continuous>  dextrose 50% Injectable 25 Gram(s) IV Push once  dextrose 50% Injectable 12.5 Gram(s) IV Push once  dextrose 50% Injectable 25 Gram(s) IV Push once  epoetin beverley (PROCRIT) Injectable 80499 Unit(s) IV Push <User Schedule>  epoetin beverley-epbx (RETACRIT) Injectable 06104 Unit(s) IV Push <User Schedule>  folic acid 1 milliGRAM(s) Oral daily  furosemide    Tablet 80 milliGRAM(s) Oral daily  glucagon  Injectable 1 milliGRAM(s) IntraMuscular once  hydrALAZINE 25 milliGRAM(s) Oral three times a day  lactobacillus acidophilus 1 Tablet(s) Oral two times a day with meals  latanoprost 0.005% Ophthalmic Solution 1 Drop(s) Both EYES at bedtime  levothyroxine 25 MICROGram(s) Oral daily  lidocaine   4% Patch 2 Patch Transdermal daily  LORazepam     Tablet 2 milliGRAM(s) Oral <User Schedule>  melatonin 3 milliGRAM(s) Oral at bedtime  metoprolol succinate ER 50 milliGRAM(s) Oral daily  pantoprazole    Tablet 40 milliGRAM(s) Oral before breakfast  sertraline 200 milliGRAM(s) Oral daily  sevelamer carbonate 800 milliGRAM(s) Oral three times a day with meals  sucralfate 1 Gram(s) Oral four times a day  vancomycin    Solution 125 milliGRAM(s) Oral every 6 hours      MEDICATIONS  (PRN):  acetaminophen     Tablet .. 1000 milliGRAM(s) Oral every 8 hours PRN Moderate Pain (4 - 6)  dextrose Oral Gel 15 Gram(s) Oral once PRN Blood Glucose LESS THAN 70 milliGRAM(s)/deciliter  ondansetron   Disintegrating Tablet 4 milliGRAM(s) Oral two times a day PRN Nausea and/or Vomiting  ondansetron Injectable 4 milliGRAM(s) IV Push every 6 hours PRN Nausea and/or Vomiting        REVIEW OF SYSTEMS:                           ALL ROS DONE [ X   ]      CONSTITUTIONAL:  LETHARGIC [   ], FEVER [   ], UNRESPONSIVE [   ]  CVS:  CP  [   ], SOB, [   ], PALPITATIONS [   ], DIZZYNESS [   ]  RS: COUGH [   ], SPUTUM [   ]  GI: ABDOMINAL PAIN [   ], NAUSEA [   ], VOMITINGS [   ], DIARRHEA [   ], CONSTIPATION [   ]  :  DYSURIA [   ], NOCTURIA [   ], INCREASED FREQUENCY [   ], DRIBLING [   ],  SKELETAL: PAINFUL JOINTS [   ], SWOLLEN JOINTS [   ], NECK ACHE [   ], LOW BACK ACHE [   ],  SKIN : ULCERS [   ], RASH [   ], ITCHING [   ]  CNS: HEAD ACHE [   ], DOUBLE VISION [   ], BLURRED VISION [   ], AMS / CONFUSION [   ], SEIZURES [   ], WEAKNESS [   ],TINGLING / NUMBNESS [   ]    PHYSICAL EXAM:    GENERAL APPEARANCE: NO DISTRESS,    ANASARCA ++ [IMPROVED]  HEENT:  NO PALLOR, NO  JVD,  NO   NODES, NECK SUPPLE  CVS: S1 +, S2 +,   RS: AEEB,  OCCASIONAL  RALES +,   CRACKLES+ AT LUNG BASES [IMPROVED]  ABD: SOFT, NT, NO, BS +  EXT: LEFT BKA  SKIN: WARM,   SKELETAL:  ROM ACCEPTABLE  CNS:  AAO X  2-3        RADIOLOGY :    RADIOLOGY AND READINGS REVIEWED      ASSESSMENT :     Hypervolemia    Hypertension    Adrenal insufficiency    CKD (chronic kidney disease)    Anemia    Glaucoma    Coronary artery disease    HLD (hyperlipidemia)    Peripheral vascular disease    Spinal stenosis of lumbosacral region    Hyperparathyroidism    Diabetes mellitus    Diabetic neuropathy    Contracture of hand    Osteoarthritis    Osteoporosis    Vision loss of left eye    ESRD on hemodialysis    Cataract    BPH (benign prostatic hyperplasia)    UTI (urinary tract infection)    Bladder mass    H/O hematuria    Osteoporosis    Vision loss of right eye    Depression    Chronic GERD    Osteomyelitis of vertebra    CHF (congestive heart failure)    Below knee amputation status, left    History of right cataract extraction    History of left cataract extraction    S/P arteriovenous (AV) fistula creation    H/O hematuria    H/O transurethral destruction of bladder lesion    History of excision of mass        PLAN:  HPI:  Patient is 62 year old male from [facility], walks with RW, with PMH of ESRD on HD (TTS), BKA- L w/prosthetic leg, DM, Left eye blindness, CHF, CAD, HTN, HLD, osteoporosis, bronchitis, current smoker, and anemia who presents with complaint of missed dialysis. Last HD . Pt states he often missed HD sessions.  patient states he missed this HD session due to mild pain in left leg, patient remains able to ambulate. Pt complains of right leg swelling and states he does not make any urine. Patient states he was having mild shortness of breath.  Pt denies any fever, chills, N/V/D, constipation, CP, numbness or tingling (2023 19:20)    # [8/9] MEETING HELD WITH PATIENT, BROTHER ARTEMIO @ [phone] AND SW TEAM. PATIENT W/ HISTORY OF ROUTINE NONCOMPLIANCE W/ LIFE-SUSTAINING TREATMENT [HD], EVALUATIONS AND INTERVENTIONS - COUNSELLED AT LENGTH TO REMAIN COMPLIANT. DISCUSSED THAT PROGNOSIS IS POOR/GRIM GIVEN UNDERLYING MEDICAL CONDITIONS AND GIVEN NONCOMPLIANCE. HE VERBALIZED UNDERSTANDING. REGARDING GOC - PATIENT WISHES FOR FULL CODE. PALLIATIVE CARE CONSULTED. PSYCHIATRY CONSULTED. PATIENT EXPRESSED THAT HE DOES GROW IMPATIENT DURING DIALYSIS SESSIONS AND HAS BEEN LEAVING SESSIONS SOONER THAN PRESCRIBED. IN DISCUSSION THAT RISKS OF DEFERRING COMPLETE HD - INCLUDE BUT ARE NOT LIMITED TO WORSENING CLINICAL CONDITION, ELECTROLYTE ABNORMALITIES, ARRHYTHMIA AND DEATH. HE VERBALIZED UNDERSTANDING. D/W PATIENT THAT REPEATEDLY REFUSING INTERVENTIONS AND ASSESSMENTS IS NOT IN LINE WITH HIS WISHES TO IMPROVE. PATIENT VERBALIZED UNDERSTANDING. DISCUSSED REGARDING CURRENT CLINICAL CONDITION, RECOMMENDED EVALUATIONS AND INTERVENTIONS. ALL QUESTIONS ANSWERED. TEAM HAS OFFERED PATIENT EMOTIONAL SUPPORT AND OFFERED MEDICATION FOR MOOD. OFFERED TO PRE-MEDICATE W/ ANXIOLYTIC PRIOR TO HD. PATIENT IS AGREEABLE.     DISCUSSED THAT PATIENT WILL NEED TO COMPLY WITH HD SESSIONS IN ORDER TO BE MEDICALLY OPTIMIZED FOR DISCHARGE AND FOR SAFE D/C PLANNING. TEAM DISCUSSED OPTIONS OF RETURNING TO [facility] W/ OUTPATIENT HD , IF CARE NEEDS CAN BE SAFELY MET VS. NURSING HOME/[facility] . PATIENT AND BROTHER VERBALIZED UNDERSTANDING.     - SW AND MEDICAL TEAM HAVING ONGOING DISCUSSION WITH PATIENT REGARDING CONSISTENT COMPLIANCE IN ORDER TO RETURN TO HD IN THE OUTPATIENT SETTING  -- CONVEYED THAT PATIENT AND TEAM THAT PATIENT WILL NEED TO CONSISTENTLY COMPLETE FULL HD SESSIONS IN ORDER TO BE CONSIDERED BY OUTPATIENT HD AT NH W/ ONSITE HD OR OUTSIDE HD FACILITY    - PATIENT REQUIRING MORE FREQUENT WEEKLY HD SESSIONS    # DIARRHEA  # R/O INFECTION   # R/O COLITIS    - PATIENT REFUSING IV LINE PLACEMENT AND BLOOD WORK; RISKS OF DEFERRING DISCUSSED, PATIENT VERBALIZED UNDERSTANDING  - PLANNED FOR BLOOD CULTURE, CBC, CMP - WITH HEMODIALYSIS  - PLACED ON PO VANCOMYCIN X 10 DAYS  - ABD XRAY and CXRAY - W/OUT ACUTE FINDINGS  - ID CONSULT    # ACUTE ON CHRONIC HYPOXIC RESPIRATORY FAILURE S/T PULMONARY EDEMA - S/T INCOMPLETE HD SESSIONS ; ANASARCA  # HYPERKALEMIA  # HX OF COPD + S/P RECENT MULTIFOCAL PNEUMONIA ; R/O ASPIRATION PNEUMONIA  # PULMONARY HYPERTENSION  # UNCONTROLLED HTN  # ESRD ON HD TTS - W/ HX OF NONCOMPLIANCE    - TELEMETRY  - HYDRALAZINE, METOPROLOL AND NORVASC ; PRN IV ANTIHYPERTENSIVE  - LOKELMA  - SUPPLEMENTAL O2  - PLANNED FOR HD  - NEPHROLOGY CONSULT  - PULMONOLOGY CONSULT  - ID CONSULT    - CONTINUES TO REFUSE OR PRE-MATURELY DISCONTINUE SESSIONS OF HD       - [] - OVERNIGHT RAPID RESPONSE S/T HYPOXIA - SELF-LIMITED - PATIENT IMPROVED S/P O2 SUPPLEMENT  - S/P CEFEPIME + FLAGYLL, BCX - NGTD      # RECURRENT INTRACTABLE NAUSEA/VOMITING, ? COFFEE GROUND EMESIS  # GASTROPARESIS  # HISTORY OF ESOPHAGITIS AND DUODENITIS [2021], HX OF GASTROPARESIS W/ EVIDENCE OF THICKENED ESOPHAGUS ON PREVIOUS CT SCAN   # PANCREAS W/ DOUBLE DUCT SIGN    - MONITORING HGB, PLACED ON PPI BID , CARAFATE QID  - PRN ANTIEMETICS  ; S/P DOSE OF REGLAN [WITH MINIMAL IMPROVEMENT]  - MONITORING FOR SYMPTOMS  - WHILE ON DIET PATIENT REPEATEDLY COUNSELLED TO CHEW FOOD CAUTIOUSLY AND CONSUME SMALL BITES W/ REGULAR CLEARING WITH WATER BETWEEN BITES    - ADVANCE DIET AS TOLERATED  - NOTED CT A/P    - S/P RECENT PRBC TRANSFUSION     - GI CONSULT  - SURGERY CONSULT    - [] - EPISODE OF NAUSEA/VOMITING OVER THE WEEKEND - IMPROVED    # LESS LIKELY CHOLECYSTITIS  - S/P ABX  - NOTED CT A/P  - HIDA SCAN - NEGATIVE  - SURGERY CONSULT    # ELEVATED TROPONINS - ? DEMAND ISCHEMIA    - S/P TELEMETRY  - TREND TROPONINS  - ECHO - TRACE MR, MODERATELY INCREASED LV WALL THICKNESS, NORMAL LV SYSTOLIC FUNCTION, SEVERE PULMONARY HTN  - CARDIOLOGY CONSULT    # EPISODE OF HYPOGLYCEMIA - IMPROVED  # GASTROPARESIS  # UNDERLYING DM  - SSI + FS  - COUNSELLED TO COMPLY WITH HD TO REDUCE UREMIA    - REPEATEDLY COUNSELLED TO COMPLY WITH FINGERSTICKS      # DEPRESSION  - PLACED ON ZOLOFT  - PSYCHIATRY CONSULT    # PATIENT UNDERGOING OUTPATIENT WORKUP FOR CENTRAL VENOUS STENOSIS THROUGH NEPHROLOGY TEAM  - ? s/p STENT PLACEMENT    # ANEMIA OF CKD  # HX OF PANCYTOPENIA   - TREND HGB, TRANSFUSION THRESHOLD HGB < 7; PATIENT STILL INTERMITTENTLY REFUSING BLOODWORK, COUNSELLED TO COMPLY  - TYPE AND SCREEN  - ON EPO  - DENIES RECENT HEMATEMESIS, MELENA, HEMATOCHEZIA    - S/P RECENT PRBC TRANSFUSION  - S/P PRBC TRANSFUSION     - PPI BID, CARAFATE QID    # SEVERE PROTEIN CALORIE MALNUTRITION, FAILURE TO THRIVE   -  TEMPORAL WASTING, LOSS OF MUSCLE MASS FROM SHOULDER AND HIP GIRDLE  - NUTRITIONAL SUPPLEMENT    # HLD  # PARTIALLY BLIND  # S/P LEFT BKA  # HX OF PVD  # LS SPINAL STENOSIS  # GI AND DVT PPX   Patient is a 62y old  Male who presents with a chief complaint of Missed dialysis (25 Aug 2023 10:34)    PATIENT IS SEEN AND EXAMINED IN MEDICAL FLOOR.    MARIIT [    ]    JEREMY [   ]      GT [   ]    ALLERGIES:  No Known Drug Allergies  fish (Rash)  liver (Anaphylaxis)      Daily     Daily Weight in k.5 (26 Aug 2023 05:35)    VITALS:    Vital Signs Last 24 Hrs  T(C): 36.8 (26 Aug 2023 05:35), Max: 37.2 (25 Aug 2023 20:35)  T(F): 98.2 (26 Aug 2023 05:35), Max: 99 (25 Aug 2023 20:35)  HR: 84 (26 Aug 2023 05:35) (81 - 90)  BP: 161/77 (26 Aug 2023 05:35) (131/55 - 161/77)  BP(mean): --  RR: 18 (26 Aug 2023 05:35) (17 - 18)  SpO2: 95% (26 Aug 2023 05:35) (95% - 99%)    Parameters below as of 26 Aug 2023 05:35  Patient On (Oxygen Delivery Method): room air        LABS:    CBC Full  -  ( 25 Aug 2023 10:15 )  WBC Count : 2.08 K/uL  RBC Count : 3.51 M/uL  Hemoglobin : 9.7 g/dL  Hematocrit : 31.0 %  Platelet Count - Automated : 97 K/uL  Mean Cell Volume : 88.3 fl  Mean Cell Hemoglobin : 27.6 pg  Mean Cell Hemoglobin Concentration : 31.3 gm/dL  Auto Neutrophil # : x  Auto Lymphocyte # : x  Auto Monocyte # : x  Auto Eosinophil # : x  Auto Basophil # : x  Auto Neutrophil % : x  Auto Lymphocyte % : x  Auto Monocyte % : x  Auto Eosinophil % : x  Auto Basophil % : x      08-    129<L>  |  96  |  36<H>  ----------------------------<  251<H>  4.6   |  22  |  8.50<H>    Ca    7.8<L>      25 Aug 2023 10:15      CAPILLARY BLOOD GLUCOSE      POCT Blood Glucose.: 179 mg/dL (26 Aug 2023 07:52)  POCT Blood Glucose.: 262 mg/dL (25 Aug 2023 20:54)  POCT Blood Glucose.: 284 mg/dL (25 Aug 2023 16:49)  POCT Blood Glucose.: 161 mg/dL (25 Aug 2023 12:05)          Creatinine Trend: 8.50<--, 10.70<--, 12.00<--, 12.30<--, 10.70<--, 11.70<--  I&O's Summary          .Blood Blood-Peripheral   @ 14:07   No Growth Final  --  --          MEDICATIONS:    MEDICATIONS  (STANDING):  albuterol    90 MICROgram(s) HFA Inhaler 2 Puff(s) Inhalation every 6 hours  amLODIPine   Tablet 10 milliGRAM(s) Oral daily  aspirin enteric coated 81 milliGRAM(s) Oral daily  atorvastatin 40 milliGRAM(s) Oral at bedtime  budesonide 160 MICROgram(s)/formoterol 4.5 MICROgram(s) Inhaler 2 Puff(s) Inhalation two times a day  chlorhexidine 2% Cloths 1 Application(s) Topical daily  dextrose 5%. 1000 milliLiter(s) (50 mL/Hr) IV Continuous <Continuous>  dextrose 5%. 1000 milliLiter(s) (100 mL/Hr) IV Continuous <Continuous>  dextrose 50% Injectable 25 Gram(s) IV Push once  dextrose 50% Injectable 12.5 Gram(s) IV Push once  dextrose 50% Injectable 25 Gram(s) IV Push once  epoetin beverley (PROCRIT) Injectable 92496 Unit(s) IV Push <User Schedule>  epoetin beverley-epbx (RETACRIT) Injectable 58430 Unit(s) IV Push <User Schedule>  folic acid 1 milliGRAM(s) Oral daily  furosemide    Tablet 80 milliGRAM(s) Oral daily  glucagon  Injectable 1 milliGRAM(s) IntraMuscular once  hydrALAZINE 25 milliGRAM(s) Oral three times a day  lactobacillus acidophilus 1 Tablet(s) Oral two times a day with meals  latanoprost 0.005% Ophthalmic Solution 1 Drop(s) Both EYES at bedtime  levothyroxine 25 MICROGram(s) Oral daily  lidocaine   4% Patch 2 Patch Transdermal daily  LORazepam     Tablet 2 milliGRAM(s) Oral <User Schedule>  melatonin 3 milliGRAM(s) Oral at bedtime  metoprolol succinate ER 50 milliGRAM(s) Oral daily  pantoprazole    Tablet 40 milliGRAM(s) Oral before breakfast  sertraline 200 milliGRAM(s) Oral daily  sevelamer carbonate 800 milliGRAM(s) Oral three times a day with meals  sucralfate 1 Gram(s) Oral four times a day  vancomycin    Solution 125 milliGRAM(s) Oral every 6 hours      MEDICATIONS  (PRN):  acetaminophen     Tablet .. 1000 milliGRAM(s) Oral every 8 hours PRN Moderate Pain (4 - 6)  dextrose Oral Gel 15 Gram(s) Oral once PRN Blood Glucose LESS THAN 70 milliGRAM(s)/deciliter  ondansetron   Disintegrating Tablet 4 milliGRAM(s) Oral two times a day PRN Nausea and/or Vomiting  ondansetron Injectable 4 milliGRAM(s) IV Push every 6 hours PRN Nausea and/or Vomiting        REVIEW OF SYSTEMS:                           ALL ROS DONE [ X   ]      CONSTITUTIONAL:  LETHARGIC [   ], FEVER [   ], UNRESPONSIVE [   ]  CVS:  CP  [   ], SOB, [   ], PALPITATIONS [   ], DIZZYNESS [   ]  RS: COUGH [   ], SPUTUM [   ]  GI: ABDOMINAL PAIN [   ], NAUSEA [   ], VOMITINGS [   ], DIARRHEA [   ], CONSTIPATION [   ]  :  DYSURIA [   ], NOCTURIA [   ], INCREASED FREQUENCY [   ], DRIBLING [   ],  SKELETAL: PAINFUL JOINTS [   ], SWOLLEN JOINTS [   ], NECK ACHE [   ], LOW BACK ACHE [   ],  SKIN : ULCERS [   ], RASH [   ], ITCHING [   ]  CNS: HEAD ACHE [   ], DOUBLE VISION [   ], BLURRED VISION [   ], AMS / CONFUSION [   ], SEIZURES [   ], WEAKNESS [   ],TINGLING / NUMBNESS [   ]        PHYSICAL EXAM:    GENERAL APPEARANCE: NO DISTRESS,    ANASARCA ++ [IMPROVED]  HEENT:  NO PALLOR, NO  JVD,  NO   NODES, NECK SUPPLE  CVS: S1 +, S2 +,   RS: AEEB,  OCCASIONAL  RALES +,   CRACKLES+ AT LUNG BASES [IMPROVED]  ABD: SOFT, NT, NO, BS +  EXT: LEFT BKA  SKIN: WARM,   SKELETAL:  ROM ACCEPTABLE  CNS:  AAO X  2-3        RADIOLOGY :    RADIOLOGY AND READINGS REVIEWED      ASSESSMENT :     Hypervolemia    Hypertension    Adrenal insufficiency    CKD (chronic kidney disease)    Anemia    Glaucoma    Coronary artery disease    HLD (hyperlipidemia)    Peripheral vascular disease    Spinal stenosis of lumbosacral region    Hyperparathyroidism    Diabetes mellitus    Diabetic neuropathy    Contracture of hand    Osteoarthritis    Osteoporosis    Vision loss of left eye    ESRD on hemodialysis    Cataract    BPH (benign prostatic hyperplasia)    UTI (urinary tract infection)    Bladder mass    H/O hematuria    Osteoporosis    Vision loss of right eye    Depression    Chronic GERD    Osteomyelitis of vertebra    CHF (congestive heart failure)    Below knee amputation status, left    History of right cataract extraction    History of left cataract extraction    S/P arteriovenous (AV) fistula creation    H/O hematuria    H/O transurethral destruction of bladder lesion    History of excision of mass          PLAN:    HPI:  Patient is 62 year old male from [facility], walks with RW, with PMH of ESRD on HD (TTS), BKA- L w/prosthetic leg, DM, Left eye blindness, CHF, CAD, HTN, HLD, osteoporosis, bronchitis, current smoker, and anemia who presents with complaint of missed dialysis. Last HD . Pt states he often missed HD sessions.  patient states he missed this HD session due to mild pain in left leg, patient remains able to ambulate. Pt complains of right leg swelling and states he does not make any urine. Patient states he was having mild shortness of breath.  Pt denies any fever, chills, N/V/D, constipation, CP, numbness or tingling (2023 19:20)        # [8/9] MEETING HELD WITH PATIENT, BROTHER ARTEMIO @ [phone] AND SW TEAM. PATIENT W/ HISTORY OF ROUTINE NONCOMPLIANCE W/ LIFE-SUSTAINING TREATMENT [HD], EVALUATIONS AND INTERVENTIONS - COUNSELLED AT LENGTH TO REMAIN COMPLIANT. DISCUSSED THAT PROGNOSIS IS POOR/GRIM GIVEN UNDERLYING MEDICAL CONDITIONS AND GIVEN NONCOMPLIANCE. HE VERBALIZED UNDERSTANDING. REGARDING GOC - PATIENT WISHES FOR FULL CODE. PALLIATIVE CARE CONSULTED. PSYCHIATRY CONSULTED. PATIENT EXPRESSED THAT HE DOES GROW IMPATIENT DURING DIALYSIS SESSIONS AND HAS BEEN LEAVING SESSIONS SOONER THAN PRESCRIBED. IN DISCUSSION THAT RISKS OF DEFERRING COMPLETE HD - INCLUDE BUT ARE NOT LIMITED TO WORSENING CLINICAL CONDITION, ELECTROLYTE ABNORMALITIES, ARRHYTHMIA AND DEATH. HE VERBALIZED UNDERSTANDING. D/W PATIENT THAT REPEATEDLY REFUSING INTERVENTIONS AND ASSESSMENTS IS NOT IN LINE WITH HIS WISHES TO IMPROVE. PATIENT VERBALIZED UNDERSTANDING. DISCUSSED REGARDING CURRENT CLINICAL CONDITION, RECOMMENDED EVALUATIONS AND INTERVENTIONS. ALL QUESTIONS ANSWERED. TEAM HAS OFFERED PATIENT EMOTIONAL SUPPORT AND OFFERED MEDICATION FOR MOOD. OFFERED TO PRE-MEDICATE W/ ANXIOLYTIC PRIOR TO HD. PATIENT IS AGREEABLE.     DISCUSSED THAT PATIENT WILL NEED TO COMPLY WITH HD SESSIONS IN ORDER TO BE MEDICALLY OPTIMIZED FOR DISCHARGE AND FOR SAFE D/C PLANNING. TEAM DISCUSSED OPTIONS OF RETURNING TO [facility] W/ OUTPATIENT HD , IF CARE NEEDS CAN BE SAFELY MET VS. NURSING HOME/[facility] . PATIENT AND BROTHER VERBALIZED UNDERSTANDING.     - SW AND MEDICAL TEAM HAVING ONGOING DISCUSSION WITH PATIENT REGARDING CONSISTENT COMPLIANCE IN ORDER TO RETURN TO HD IN THE OUTPATIENT SETTING  -- CONVEYED THAT PATIENT AND TEAM THAT PATIENT WILL NEED TO CONSISTENTLY COMPLETE FULL HD SESSIONS IN ORDER TO BE CONSIDERED BY OUTPATIENT HD AT NH W/ ONSITE HD OR OUTSIDE HD FACILITY    - PATIENT REQUIRING MORE FREQUENT WEEKLY HD SESSIONS    # DIARRHEA  # R/O INFECTION   # R/O COLITIS    - PATIENT REFUSING IV LINE PLACEMENT AND BLOOD WORK; RISKS OF DEFERRING DISCUSSED, PATIENT VERBALIZED UNDERSTANDING  - PLANNED FOR BLOOD CULTURE, CBC, CMP - WITH HEMODIALYSIS  - PLACED ON PO VANCOMYCIN X 10 DAYS  - ABD XRAY and CXRAY - W/OUT ACUTE FINDINGS  - ID CONSULT    # ACUTE ON CHRONIC HYPOXIC RESPIRATORY FAILURE S/T PULMONARY EDEMA - S/T INCOMPLETE HD SESSIONS ; ANASARCA  # HYPERKALEMIA  # HX OF COPD + S/P RECENT MULTIFOCAL PNEUMONIA ; R/O ASPIRATION PNEUMONIA  # PULMONARY HYPERTENSION  # UNCONTROLLED HTN  # ESRD ON HD TTS - W/ HX OF NONCOMPLIANCE    - TELEMETRY  - HYDRALAZINE, METOPROLOL AND NORVASC ; PRN IV ANTIHYPERTENSIVE  - LOKELMA  - SUPPLEMENTAL O2  - PLANNED FOR HD  - NEPHROLOGY CONSULT  - PULMONOLOGY CONSULT  - ID CONSULT    - CONTINUES TO REFUSE OR PRE-MATURELY DISCONTINUE SESSIONS OF HD       - [] - OVERNIGHT RAPID RESPONSE S/T HYPOXIA - SELF-LIMITED - PATIENT IMPROVED S/P O2 SUPPLEMENT  - S/P CEFEPIME + FLAGYLL, BCX - NGTD      # RECURRENT INTRACTABLE NAUSEA/VOMITING, ? COFFEE GROUND EMESIS  # GASTROPARESIS  # HISTORY OF ESOPHAGITIS AND DUODENITIS [2021], HX OF GASTROPARESIS W/ EVIDENCE OF THICKENED ESOPHAGUS ON PREVIOUS CT SCAN   # PANCREAS W/ DOUBLE DUCT SIGN    - MONITORING HGB, PLACED ON PPI BID , CARAFATE QID  - PRN ANTIEMETICS  ; S/P DOSE OF REGLAN [WITH MINIMAL IMPROVEMENT]  - MONITORING FOR SYMPTOMS  - WHILE ON DIET PATIENT REPEATEDLY COUNSELLED TO CHEW FOOD CAUTIOUSLY AND CONSUME SMALL BITES W/ REGULAR CLEARING WITH WATER BETWEEN BITES    - ADVANCE DIET AS TOLERATED  - NOTED CT A/P    - S/P RECENT PRBC TRANSFUSION     - GI CONSULT  - SURGERY CONSULT    - [] - EPISODE OF NAUSEA/VOMITING OVER THE WEEKEND - IMPROVED    # LESS LIKELY CHOLECYSTITIS  - S/P ABX  - NOTED CT A/P  - HIDA SCAN - NEGATIVE  - SURGERY CONSULT    # ELEVATED TROPONINS - ? DEMAND ISCHEMIA    - S/P TELEMETRY  - TREND TROPONINS  - ECHO - TRACE MR, MODERATELY INCREASED LV WALL THICKNESS, NORMAL LV SYSTOLIC FUNCTION, SEVERE PULMONARY HTN  - CARDIOLOGY CONSULT    # EPISODE OF HYPOGLYCEMIA - IMPROVED  # GASTROPARESIS  # UNDERLYING DM  - SSI + FS  - COUNSELLED TO COMPLY WITH HD TO REDUCE UREMIA    - REPEATEDLY COUNSELLED TO COMPLY WITH FINGERSTICKS      # DEPRESSION  - PLACED ON ZOLOFT  - PSYCHIATRY CONSULT    # PATIENT UNDERGOING OUTPATIENT WORKUP FOR CENTRAL VENOUS STENOSIS THROUGH NEPHROLOGY TEAM  - ? s/p STENT PLACEMENT    # ANEMIA OF CKD  # HX OF PANCYTOPENIA   - TREND HGB, TRANSFUSION THRESHOLD HGB < 7; PATIENT STILL INTERMITTENTLY REFUSING BLOODWORK, COUNSELLED TO COMPLY  - TYPE AND SCREEN  - ON EPO  - DENIES RECENT HEMATEMESIS, MELENA, HEMATOCHEZIA    - S/P RECENT PRBC TRANSFUSION  - S/P PRBC TRANSFUSION     - PPI BID, CARAFATE QID    # SEVERE PROTEIN CALORIE MALNUTRITION, FAILURE TO THRIVE   -  TEMPORAL WASTING, LOSS OF MUSCLE MASS FROM SHOULDER AND HIP GIRDLE  - NUTRITIONAL SUPPLEMENT    # HLD  # PARTIALLY BLIND  # S/P LEFT BKA  # HX OF PVD  # LS SPINAL STENOSIS  # GI AND DVT PPX    DR. COLIN MAC

## 2023-08-26 NOTE — PROGRESS NOTE ADULT - ASSESSMENT
C. Difficile Colitis - improved      Plan Cont Vancomycin 125mgs po q6hrs till 8/30/23  DC planning  reconsult prn.

## 2023-08-27 NOTE — PROGRESS NOTE ADULT - SUBJECTIVE AND OBJECTIVE BOX
Patient is a 62y old  Male who presents with a chief complaint of Missed dialysis (26 Aug 2023 16:46)    PATIENT IS SEEN AND EXAMINED IN MEDICAL FLOOR.    MARIIT [    ]    JEREMY [   ]      GT [   ]    ALLERGIES:  No Known Drug Allergies  fish (Rash)  liver (Anaphylaxis)      Daily     Daily     VITALS:    Vital Signs Last 24 Hrs  T(C): 36.8 (27 Aug 2023 12:25), Max: 36.9 (27 Aug 2023 04:45)  T(F): 98.3 (27 Aug 2023 12:25), Max: 98.5 (27 Aug 2023 04:45)  HR: 77 (27 Aug 2023 12:25) (77 - 90)  BP: 90/46 (27 Aug 2023 12:25) (90/46 - 116/73)  BP(mean): 56 (27 Aug 2023 12:25) (56 - 56)  RR: 15 (27 Aug 2023 12:25) (15 - 18)  SpO2: 100% (27 Aug 2023 12:25) (95% - 100%)    Parameters below as of 27 Aug 2023 12:25  Patient On (Oxygen Delivery Method): room air        LABS:    CBC Full  -  ( 27 Aug 2023 08:03 )  WBC Count : 1.94 K/uL  RBC Count : 3.70 M/uL  Hemoglobin : 10.2 g/dL  Hematocrit : 33.0 %  Platelet Count - Automated : 87 K/uL  Mean Cell Volume : 89.2 fl  Mean Cell Hemoglobin : 27.6 pg  Mean Cell Hemoglobin Concentration : 30.9 gm/dL  Auto Neutrophil # : x  Auto Lymphocyte # : x  Auto Monocyte # : x  Auto Eosinophil # : x  Auto Basophil # : x  Auto Neutrophil % : x  Auto Lymphocyte % : x  Auto Monocyte % : x  Auto Eosinophil % : x  Auto Basophil % : x      08-27    130<L>  |  94<L>  |  35<H>  ----------------------------<  350<H>  4.3   |  23  |  7.82<H>    Ca    8.1<L>      27 Aug 2023 08:03      CAPILLARY BLOOD GLUCOSE      POCT Blood Glucose.: 127 mg/dL (27 Aug 2023 11:45)  POCT Blood Glucose.: 279 mg/dL (27 Aug 2023 08:11)  POCT Blood Glucose.: 176 mg/dL (26 Aug 2023 21:48)  POCT Blood Glucose.: 395 mg/dL (26 Aug 2023 16:26)          Creatinine Trend: 7.82<--, 8.50<--, 10.70<--, 12.00<--, 12.30<--, 10.70<--  I&O's Summary              MEDICATIONS:    MEDICATIONS  (STANDING):  albuterol    90 MICROgram(s) HFA Inhaler 2 Puff(s) Inhalation every 6 hours  amLODIPine   Tablet 10 milliGRAM(s) Oral daily  aspirin enteric coated 81 milliGRAM(s) Oral daily  atorvastatin 40 milliGRAM(s) Oral at bedtime  budesonide 160 MICROgram(s)/formoterol 4.5 MICROgram(s) Inhaler 2 Puff(s) Inhalation two times a day  chlorhexidine 2% Cloths 1 Application(s) Topical daily  dextrose 5%. 1000 milliLiter(s) (50 mL/Hr) IV Continuous <Continuous>  dextrose 5%. 1000 milliLiter(s) (100 mL/Hr) IV Continuous <Continuous>  dextrose 50% Injectable 25 Gram(s) IV Push once  dextrose 50% Injectable 12.5 Gram(s) IV Push once  dextrose 50% Injectable 25 Gram(s) IV Push once  epoetin beverley (PROCRIT) Injectable 28653 Unit(s) IV Push <User Schedule>  folic acid 1 milliGRAM(s) Oral daily  furosemide    Tablet 80 milliGRAM(s) Oral daily  glucagon  Injectable 1 milliGRAM(s) IntraMuscular once  hydrALAZINE 25 milliGRAM(s) Oral three times a day  insulin lispro (ADMELOG) corrective regimen sliding scale   SubCutaneous three times a day before meals  insulin lispro (ADMELOG) corrective regimen sliding scale   SubCutaneous at bedtime  lactobacillus acidophilus 1 Tablet(s) Oral two times a day with meals  latanoprost 0.005% Ophthalmic Solution 1 Drop(s) Both EYES at bedtime  levothyroxine 25 MICROGram(s) Oral daily  lidocaine   4% Patch 2 Patch Transdermal daily  LORazepam     Tablet 2 milliGRAM(s) Oral <User Schedule>  melatonin 3 milliGRAM(s) Oral at bedtime  metoprolol succinate ER 50 milliGRAM(s) Oral daily  pantoprazole    Tablet 40 milliGRAM(s) Oral before breakfast  sertraline 200 milliGRAM(s) Oral daily  sevelamer carbonate 800 milliGRAM(s) Oral three times a day with meals  sucralfate 1 Gram(s) Oral four times a day  vancomycin    Solution 125 milliGRAM(s) Oral every 6 hours      MEDICATIONS  (PRN):  acetaminophen     Tablet .. 1000 milliGRAM(s) Oral every 8 hours PRN Moderate Pain (4 - 6)  dextrose Oral Gel 15 Gram(s) Oral once PRN Blood Glucose LESS THAN 70 milliGRAM(s)/deciliter  ondansetron   Disintegrating Tablet 4 milliGRAM(s) Oral two times a day PRN Nausea and/or Vomiting  ondansetron Injectable 4 milliGRAM(s) IV Push every 6 hours PRN Nausea and/or Vomiting        REVIEW OF SYSTEMS:                           ALL ROS DONE [ X   ]      CONSTITUTIONAL:  LETHARGIC [   ], FEVER [   ], UNRESPONSIVE [   ]  CVS:  CP  [   ], SOB, [   ], PALPITATIONS [   ], DIZZYNESS [   ]  RS: COUGH [   ], SPUTUM [   ]  GI: ABDOMINAL PAIN [   ], NAUSEA [   ], VOMITINGS [   ], DIARRHEA [   ], CONSTIPATION [   ]  :  DYSURIA [   ], NOCTURIA [   ], INCREASED FREQUENCY [   ], DRIBLING [   ],  SKELETAL: PAINFUL JOINTS [   ], SWOLLEN JOINTS [   ], NECK ACHE [   ], LOW BACK ACHE [   ],  SKIN : ULCERS [   ], RASH [   ], ITCHING [   ]  CNS: HEAD ACHE [   ], DOUBLE VISION [   ], BLURRED VISION [   ], AMS / CONFUSION [   ], SEIZURES [   ], WEAKNESS [   ],TINGLING / NUMBNESS [   ]        PHYSICAL EXAM:    GENERAL APPEARANCE: NO DISTRESS,    ANASARCA ++ [IMPROVED]  HEENT:  NO PALLOR, NO  JVD,  NO   NODES, NECK SUPPLE  CVS: S1 +, S2 +,   RS: AEEB,  OCCASIONAL  RALES +,   CRACKLES+ AT LUNG BASES [IMPROVED]  ABD: SOFT, NT, NO, BS +  EXT: LEFT BKA  SKIN: WARM,   SKELETAL:  ROM ACCEPTABLE  CNS:  AAO X  2-3        RADIOLOGY :    RADIOLOGY AND READINGS REVIEWED      ASSESSMENT :     Hypervolemia    Hypertension    Adrenal insufficiency    CKD (chronic kidney disease)    Anemia    Glaucoma    Coronary artery disease    HLD (hyperlipidemia)    Peripheral vascular disease    Spinal stenosis of lumbosacral region    Hyperparathyroidism    Diabetes mellitus    Diabetic neuropathy    Contracture of hand    Osteoarthritis    Osteoporosis    Vision loss of left eye    ESRD on hemodialysis    Cataract    BPH (benign prostatic hyperplasia)    UTI (urinary tract infection)    Bladder mass    H/O hematuria    Osteoporosis    Vision loss of right eye    Depression    Chronic GERD    Osteomyelitis of vertebra    CHF (congestive heart failure)    Below knee amputation status, left    History of right cataract extraction    History of left cataract extraction    S/P arteriovenous (AV) fistula creation    H/O hematuria    H/O transurethral destruction of bladder lesion    History of excision of mass          PLAN:    HPI:  Patient is 62 year old male from [facility], walks with RW, with PMH of ESRD on HD (TTS), BKA- L w/prosthetic leg, DM, Left eye blindness, CHF, CAD, HTN, HLD, osteoporosis, bronchitis, current smoker, and anemia who presents with complaint of missed dialysis. Last HD 7/22. Pt states he often missed HD sessions.  patient states he missed this HD session due to mild pain in left leg, patient remains able to ambulate. Pt complains of right leg swelling and states he does not make any urine. Patient states he was having mild shortness of breath.  Pt denies any fever, chills, N/V/D, constipation, CP, numbness or tingling (26 Jul 2023 19:20)      # DC PLAN IS BEING D/W PATIENT, NEPHROLOGY TEAM  AND SW       # [8/9] MEETING HELD WITH PATIENT, BROTHER ARTEMIO @ [phone] AND SW TEAM. PATIENT W/ HISTORY OF ROUTINE NONCOMPLIANCE W/ LIFE-SUSTAINING TREATMENT [HD], EVALUATIONS AND INTERVENTIONS - COUNSELLED AT LENGTH TO REMAIN COMPLIANT. DISCUSSED THAT PROGNOSIS IS POOR/GRIM GIVEN UNDERLYING MEDICAL CONDITIONS AND GIVEN NONCOMPLIANCE. HE VERBALIZED UNDERSTANDING. REGARDING GOC - PATIENT WISHES FOR FULL CODE. PALLIATIVE CARE CONSULTED. PSYCHIATRY CONSULTED. PATIENT EXPRESSED THAT HE DOES GROW IMPATIENT DURING DIALYSIS SESSIONS AND HAS BEEN LEAVING SESSIONS SOONER THAN PRESCRIBED. IN DISCUSSION THAT RISKS OF DEFERRING COMPLETE HD - INCLUDE BUT ARE NOT LIMITED TO WORSENING CLINICAL CONDITION, ELECTROLYTE ABNORMALITIES, ARRHYTHMIA AND DEATH. HE VERBALIZED UNDERSTANDING. D/W PATIENT THAT REPEATEDLY REFUSING INTERVENTIONS AND ASSESSMENTS IS NOT IN LINE WITH HIS WISHES TO IMPROVE. PATIENT VERBALIZED UNDERSTANDING. DISCUSSED REGARDING CURRENT CLINICAL CONDITION, RECOMMENDED EVALUATIONS AND INTERVENTIONS. ALL QUESTIONS ANSWERED. TEAM HAS OFFERED PATIENT EMOTIONAL SUPPORT AND OFFERED MEDICATION FOR MOOD. OFFERED TO PRE-MEDICATE W/ ANXIOLYTIC PRIOR TO HD. PATIENT IS AGREEABLE.     DISCUSSED THAT PATIENT WILL NEED TO COMPLY WITH HD SESSIONS IN ORDER TO BE MEDICALLY OPTIMIZED FOR DISCHARGE AND FOR SAFE D/C PLANNING. TEAM DISCUSSED OPTIONS OF RETURNING TO [facility] W/ OUTPATIENT HD , IF CARE NEEDS CAN BE SAFELY MET VS. NURSING HOME/[facility] . PATIENT AND BROTHER VERBALIZED UNDERSTANDING.     - SW AND MEDICAL TEAM HAVING ONGOING DISCUSSION WITH PATIENT REGARDING CONSISTENT COMPLIANCE IN ORDER TO RETURN TO HD IN THE OUTPATIENT SETTING  -- CONVEYED THAT PATIENT AND TEAM THAT PATIENT WILL NEED TO CONSISTENTLY COMPLETE FULL HD SESSIONS IN ORDER TO BE CONSIDERED BY OUTPATIENT HD AT NH W/ ONSITE HD OR OUTSIDE HD FACILITY    - PATIENT REQUIRING MORE FREQUENT WEEKLY HD SESSIONS    # DIARRHEA  # R/O INFECTION   # R/O COLITIS    - PATIENT REFUSING IV LINE PLACEMENT AND BLOOD WORK; RISKS OF DEFERRING DISCUSSED, PATIENT VERBALIZED UNDERSTANDING  - PLANNED FOR BLOOD CULTURE, CBC, CMP - WITH HEMODIALYSIS  - PLACED ON PO VANCOMYCIN X 10 DAYS  - ABD XRAY and CXRAY - W/OUT ACUTE FINDINGS  - ID CONSULT    # ACUTE ON CHRONIC HYPOXIC RESPIRATORY FAILURE S/T PULMONARY EDEMA - S/T INCOMPLETE HD SESSIONS ; ANASARCA  # HYPERKALEMIA  # HX OF COPD + S/P RECENT MULTIFOCAL PNEUMONIA ; R/O ASPIRATION PNEUMONIA  # PULMONARY HYPERTENSION  # UNCONTROLLED HTN  # ESRD ON HD TTS - W/ HX OF NONCOMPLIANCE    - TELEMETRY  - HYDRALAZINE, METOPROLOL AND NORVASC ; PRN IV ANTIHYPERTENSIVE  - LOKELMA  - SUPPLEMENTAL O2  - PLANNED FOR HD  - NEPHROLOGY CONSULT  - PULMONOLOGY CONSULT  - ID CONSULT    - CONTINUES TO REFUSE OR PRE-MATURELY DISCONTINUE SESSIONS OF HD       - [7/30] - OVERNIGHT RAPID RESPONSE S/T HYPOXIA - SELF-LIMITED - PATIENT IMPROVED S/P O2 SUPPLEMENT  - S/P CEFEPIME + FLAGYLL, BCX - NGTD      # RECURRENT INTRACTABLE NAUSEA/VOMITING, ? COFFEE GROUND EMESIS  # GASTROPARESIS  # HISTORY OF ESOPHAGITIS AND DUODENITIS [1/2021], HX OF GASTROPARESIS W/ EVIDENCE OF THICKENED ESOPHAGUS ON PREVIOUS CT SCAN   # PANCREAS W/ DOUBLE DUCT SIGN    - MONITORING HGB, PLACED ON PPI BID , CARAFATE QID  - PRN ANTIEMETICS  ; S/P DOSE OF REGLAN [WITH MINIMAL IMPROVEMENT]  - MONITORING FOR SYMPTOMS  - WHILE ON DIET PATIENT REPEATEDLY COUNSELLED TO CHEW FOOD CAUTIOUSLY AND CONSUME SMALL BITES W/ REGULAR CLEARING WITH WATER BETWEEN BITES    - ADVANCE DIET AS TOLERATED  - NOTED CT A/P    - S/P RECENT PRBC TRANSFUSION     - GI CONSULT  - SURGERY CONSULT    - [8/14] - EPISODE OF NAUSEA/VOMITING OVER THE WEEKEND - IMPROVED    # LESS LIKELY CHOLECYSTITIS  - S/P ABX  - NOTED CT A/P  - HIDA SCAN - NEGATIVE  - SURGERY CONSULT    # ELEVATED TROPONINS - ? DEMAND ISCHEMIA    - S/P TELEMETRY  - TREND TROPONINS  - ECHO - TRACE MR, MODERATELY INCREASED LV WALL THICKNESS, NORMAL LV SYSTOLIC FUNCTION, SEVERE PULMONARY HTN  - CARDIOLOGY CONSULT    # EPISODE OF HYPOGLYCEMIA - IMPROVED  # GASTROPARESIS  # UNDERLYING DM  - SSI + FS  - COUNSELLED TO COMPLY WITH HD TO REDUCE UREMIA    - REPEATEDLY COUNSELLED TO COMPLY WITH FINGERSTICKS      # DEPRESSION  - PLACED ON ZOLOFT  - PSYCHIATRY CONSULT    # PATIENT UNDERGOING OUTPATIENT WORKUP FOR CENTRAL VENOUS STENOSIS THROUGH NEPHROLOGY TEAM  - ? s/p STENT PLACEMENT    # ANEMIA OF CKD  # HX OF PANCYTOPENIA   - TREND HGB, TRANSFUSION THRESHOLD HGB < 7; PATIENT STILL INTERMITTENTLY REFUSING BLOODWORK, COUNSELLED TO COMPLY  - TYPE AND SCREEN  - ON EPO  - DENIES RECENT HEMATEMESIS, MELENA, HEMATOCHEZIA    - S/P RECENT PRBC TRANSFUSION  - S/P PRBC TRANSFUSION 7/29    - PPI BID, CARAFATE QID    # SEVERE PROTEIN CALORIE MALNUTRITION, FAILURE TO THRIVE   -  TEMPORAL WASTING, LOSS OF MUSCLE MASS FROM SHOULDER AND HIP GIRDLE  - NUTRITIONAL SUPPLEMENT    # HLD  # PARTIALLY BLIND  # S/P LEFT BKA  # HX OF PVD  # LS SPINAL STENOSIS  # GI AND DVT PPX    DR. COLIN MAC

## 2023-08-27 NOTE — PROGRESS NOTE ADULT - SUBJECTIVE AND OBJECTIVE BOX
Time of Visit:  Patient seen and examined.     MEDICATIONS  (STANDING):  albuterol    90 MICROgram(s) HFA Inhaler 2 Puff(s) Inhalation every 6 hours  amLODIPine   Tablet 10 milliGRAM(s) Oral daily  aspirin enteric coated 81 milliGRAM(s) Oral daily  atorvastatin 40 milliGRAM(s) Oral at bedtime  budesonide 160 MICROgram(s)/formoterol 4.5 MICROgram(s) Inhaler 2 Puff(s) Inhalation two times a day  chlorhexidine 2% Cloths 1 Application(s) Topical daily  dextrose 5%. 1000 milliLiter(s) (50 mL/Hr) IV Continuous <Continuous>  dextrose 5%. 1000 milliLiter(s) (100 mL/Hr) IV Continuous <Continuous>  dextrose 50% Injectable 25 Gram(s) IV Push once  dextrose 50% Injectable 12.5 Gram(s) IV Push once  dextrose 50% Injectable 25 Gram(s) IV Push once  epoetin beverley (PROCRIT) Injectable 25234 Unit(s) IV Push <User Schedule>  folic acid 1 milliGRAM(s) Oral daily  furosemide    Tablet 80 milliGRAM(s) Oral daily  glucagon  Injectable 1 milliGRAM(s) IntraMuscular once  hydrALAZINE 25 milliGRAM(s) Oral three times a day  insulin lispro (ADMELOG) corrective regimen sliding scale   SubCutaneous three times a day before meals  insulin lispro (ADMELOG) corrective regimen sliding scale   SubCutaneous at bedtime  lactobacillus acidophilus 1 Tablet(s) Oral two times a day with meals  latanoprost 0.005% Ophthalmic Solution 1 Drop(s) Both EYES at bedtime  levothyroxine 25 MICROGram(s) Oral daily  lidocaine   4% Patch 2 Patch Transdermal daily  LORazepam     Tablet 2 milliGRAM(s) Oral <User Schedule>  melatonin 3 milliGRAM(s) Oral at bedtime  metoprolol succinate ER 50 milliGRAM(s) Oral daily  pantoprazole    Tablet 40 milliGRAM(s) Oral before breakfast  sertraline 200 milliGRAM(s) Oral daily  sevelamer carbonate 800 milliGRAM(s) Oral three times a day with meals  sucralfate 1 Gram(s) Oral four times a day  vancomycin    Solution 125 milliGRAM(s) Oral every 6 hours      MEDICATIONS  (PRN):  acetaminophen     Tablet .. 1000 milliGRAM(s) Oral every 8 hours PRN Moderate Pain (4 - 6)  dextrose Oral Gel 15 Gram(s) Oral once PRN Blood Glucose LESS THAN 70 milliGRAM(s)/deciliter  ondansetron   Disintegrating Tablet 4 milliGRAM(s) Oral two times a day PRN Nausea and/or Vomiting  ondansetron Injectable 4 milliGRAM(s) IV Push every 6 hours PRN Nausea and/or Vomiting       Medications up to date at time of exam.      PHYSICAL EXAMINATION:  Patient has no new complaints.  GENERAL: The patient is a well-developed, well-nourished, in no apparent distress.     Vital Signs Last 24 Hrs  T(C): 36.8 (27 Aug 2023 12:25), Max: 36.9 (27 Aug 2023 04:45)  T(F): 98.3 (27 Aug 2023 12:25), Max: 98.5 (27 Aug 2023 04:45)  HR: 77 (27 Aug 2023 12:25) (77 - 90)  BP: 90/46 (27 Aug 2023 12:25) (90/46 - 116/73)  BP(mean): 56 (27 Aug 2023 12:25) (56 - 56)  RR: 15 (27 Aug 2023 12:25) (15 - 18)  SpO2: 100% (27 Aug 2023 12:25) (95% - 100%)    Parameters below as of 27 Aug 2023 12:25  Patient On (Oxygen Delivery Method): room air       (if applicable)    Chest Tube (if applicable)    HEENT: Head is normocephalic and atraumatic. Extraocular muscles are intact. Mucous membranes are moist.     NECK: Supple, no palpable adenopathy.    LUNGS: Clear to auscultation, no wheezing, rales, or rhonchi.    HEART: Regular rate and rhythm without murmur.    ABDOMEN: Soft, nontender, and nondistended.  No hepatosplenomegaly is noted.    : No painful voiding, no pelvic pain    EXTREMITIES: L BKA    NEUROLOGIC: Awake, alert, oriented, grossly intact    SKIN: Warm, dry, good turgor.      LABS:                        10.2   1.94  )-----------( 87       ( 27 Aug 2023 08:03 )             33.0     08-27    130<L>  |  94<L>  |  35<H>  ----------------------------<  350<H>  4.3   |  23  |  7.82<H>    Ca    8.1<L>      27 Aug 2023 08:03        Urinalysis Basic - ( 27 Aug 2023 08:03 )    Color: x / Appearance: x / SG: x / pH: x  Gluc: 350 mg/dL / Ketone: x  / Bili: x / Urobili: x   Blood: x / Protein: x / Nitrite: x   Leuk Esterase: x / RBC: x / WBC x   Sq Epi: x / Non Sq Epi: x / Bacteria: x                      MICROBIOLOGY: (if applicable)    RADIOLOGY & ADDITIONAL STUDIES:  EKG:   CXR:  ECHO:    IMPRESSION: 62y Male 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      Osteoarthritis      Vision loss of left eye  blind      ESRD on hemodialysis  T/Th/S      Cataract  both eyes - hx of sx done      BPH (benign prostatic hyperplasia)      UTI (urinary tract infection)  hx of      Bladder mass  hx of      H/O hematuria      Osteoporosis      Vision loss of right eye  decreased      Depression      Chronic GERD      Osteomyelitis of vertebra      CHF (congestive heart failure)      Below knee amputation status, left  2012- pt is wearing prostesis      History of right cataract extraction      History of left cataract extraction      S/P arteriovenous (AV) fistula creation  right arm brachiocephalic arteriovenous fistula on 11/08/2018      H/O hematuria  s/p bladder bx and fulguration 2/25/2020      H/O transurethral destruction of bladder lesion  2020      History of excision of mass  back mass on 03/31/2021       p/w          Impression: This is a 62 yr old man  from [facility] with ESRD on HD ( T-Th-Sat ). Current smoker . Presented to ED due to Missed Dialysis, last HD 07-22-23 . Per Patient stated that he often missed HD sessions due to Mild left leg pain and right leg swelling . S/p RRT for SOB with Hypoxia due to Acute Hypoxic Respiratory Failure secondary to Pulmonary edema/ Fluid overload due to Missed Dialysis . CT Abdomen with Small Bilateral Pleural Effusions, Rt Lower Lung with groundglass opacity. No bowel obstruction. Small Gall Stone.   Vomiting due to diabetic gastroparesis vs acute cholecystitis . Had a HAP with s/p Cefepime and Flagyl .       Suggestions:  - O2 saturation 98 % room air. So far been saturating good room air.   - Neph suggest premedicate with ativan before HD .  - Limit fluid intake .  - Continue Albuterol 90 mcg 2 puffs Q 6 Hours.   - Continue Symbicort 160-4.5 mcg 2 Puffs Twice Daily .   - Reinforced the importance of compliance to Dialysis schedule.

## 2023-08-28 NOTE — PROGRESS NOTE ADULT - SUBJECTIVE AND OBJECTIVE BOX
NP Note discussed with  Primary Attending    Patient is a 62y old  Male who presents with a chief complaint of Missed dialysis (28 Aug 2023 10:10)      INTERVAL HPI/OVERNIGHT EVENTS: no new complaints    MEDICATIONS  (STANDING):  albuterol    90 MICROgram(s) HFA Inhaler 2 Puff(s) Inhalation every 6 hours  amLODIPine   Tablet 10 milliGRAM(s) Oral daily  aspirin enteric coated 81 milliGRAM(s) Oral daily  atorvastatin 40 milliGRAM(s) Oral at bedtime  budesonide 160 MICROgram(s)/formoterol 4.5 MICROgram(s) Inhaler 2 Puff(s) Inhalation two times a day  chlorhexidine 2% Cloths 1 Application(s) Topical daily  dextrose 5%. 1000 milliLiter(s) (100 mL/Hr) IV Continuous <Continuous>  dextrose 5%. 1000 milliLiter(s) (50 mL/Hr) IV Continuous <Continuous>  dextrose 50% Injectable 25 Gram(s) IV Push once  dextrose 50% Injectable 12.5 Gram(s) IV Push once  dextrose 50% Injectable 25 Gram(s) IV Push once  epoetin beverley (PROCRIT) Injectable 81557 Unit(s) IV Push <User Schedule>  folic acid 1 milliGRAM(s) Oral daily  furosemide    Tablet 80 milliGRAM(s) Oral daily  glucagon  Injectable 1 milliGRAM(s) IntraMuscular once  hydrALAZINE 25 milliGRAM(s) Oral three times a day  insulin lispro (ADMELOG) corrective regimen sliding scale   SubCutaneous three times a day before meals  insulin lispro (ADMELOG) corrective regimen sliding scale   SubCutaneous at bedtime  lactobacillus acidophilus 1 Tablet(s) Oral two times a day with meals  latanoprost 0.005% Ophthalmic Solution 1 Drop(s) Both EYES at bedtime  levothyroxine 25 MICROGram(s) Oral daily  lidocaine   4% Patch 2 Patch Transdermal daily  LORazepam     Tablet 2 milliGRAM(s) Oral <User Schedule>  melatonin 3 milliGRAM(s) Oral at bedtime  metoprolol succinate ER 50 milliGRAM(s) Oral daily  pantoprazole    Tablet 40 milliGRAM(s) Oral before breakfast  sertraline 200 milliGRAM(s) Oral daily  sevelamer carbonate 800 milliGRAM(s) Oral three times a day with meals  sucralfate 1 Gram(s) Oral four times a day  vancomycin    Solution 125 milliGRAM(s) Oral every 6 hours    MEDICATIONS  (PRN):  acetaminophen     Tablet .. 1000 milliGRAM(s) Oral every 8 hours PRN Moderate Pain (4 - 6)  dextrose Oral Gel 15 Gram(s) Oral once PRN Blood Glucose LESS THAN 70 milliGRAM(s)/deciliter  ondansetron   Disintegrating Tablet 4 milliGRAM(s) Oral two times a day PRN Nausea and/or Vomiting  ondansetron Injectable 4 milliGRAM(s) IV Push every 6 hours PRN Nausea and/or Vomiting      __________________________________________________  REVIEW OF SYSTEMS:    CONSTITUTIONAL: No fever,   EYES: no acute visual disturbances  NECK: No pain or stiffness  RESPIRATORY: No cough; No shortness of breath  CARDIOVASCULAR: No chest pain, no palpitations  GASTROINTESTINAL: No pain. No nausea or vomiting; No diarrhea   NEUROLOGICAL: No headache or numbness, no tremors  MUSCULOSKELETAL: No joint pain, no muscle pain  GENITOURINARY: no dysuria, no frequency, no hesitancy  PSYCHIATRY: no depression , no anxiety  ALL OTHER  ROS negative        Vital Signs Last 24 Hrs  T(C): 36.8 (28 Aug 2023 10:35), Max: 37.1 (27 Aug 2023 20:28)  T(F): 98.2 (28 Aug 2023 10:35), Max: 98.8 (27 Aug 2023 20:28)  HR: 76 (28 Aug 2023 10:35) (75 - 101)  BP: 169/82 (28 Aug 2023 10:35) (112/47 - 170/82)  BP(mean): 66 (28 Aug 2023 05:16) (66 - 66)  RR: 18 (28 Aug 2023 10:35) (14 - 18)  SpO2: 96% (28 Aug 2023 10:35) (96% - 100%)    Parameters below as of 28 Aug 2023 10:35  Patient On (Oxygen Delivery Method): room air        ________________________________________________  PHYSICAL EXAM:  GENERAL: NAD  HEENT: Normocephalic;  conjunctivae and sclerae clear; moist mucous membranes;   NECK : supple  CHEST/LUNG: Clear to auscultation bilaterally with good air entry   HEART: S1 S2  regular; no murmurs, gallops or rubs  ABDOMEN: Soft, Nontender, Nondistended; Bowel sounds present  EXTREMITIES: no cyanosis; no edema; no calf tenderness  SKIN: warm and dry; no rash  NERVOUS SYSTEM:  Awake and alert; no new deficits    _________________________________________________  LABS:                        9.9    2.28  )-----------( 90       ( 28 Aug 2023 10:34 )             32.0     08-28    127<L>  |  96  |  45<H>  ----------------------------<  211<H>  6.3<HH>   |  23  |  9.81<H>    Ca    7.9<L>      28 Aug 2023 10:34  Phos  4.6     08-28  Mg     3.1     08-28    TPro  6.5  /  Alb  2.9<L>  /  TBili  0.5  /  DBili  x   /  AST  39  /  ALT  76<H>  /  AlkPhos  107  08-28      Urinalysis Basic - ( 28 Aug 2023 10:34 )    Color: x / Appearance: x / SG: x / pH: x  Gluc: 211 mg/dL / Ketone: x  / Bili: x / Urobili: x   Blood: x / Protein: x / Nitrite: x   Leuk Esterase: x / RBC: x / WBC x   Sq Epi: x / Non Sq Epi: x / Bacteria: x      CAPILLARY BLOOD GLUCOSE      POCT Blood Glucose.: 128 mg/dL (28 Aug 2023 12:01)  POCT Blood Glucose.: 237 mg/dL (28 Aug 2023 08:13)  POCT Blood Glucose.: 246 mg/dL (27 Aug 2023 21:16)  POCT Blood Glucose.: 207 mg/dL (27 Aug 2023 16:10)        RADIOLOGY & ADDITIONAL TESTS:  < from: Xray Abdomen 1 View PORTABLE -Urgent (Xray Abdomen 1 View PORTABLE -Urgent .) (08.21.23 @ 22:36) >    ACC: [id number] EXAM:  XR ABDOMEN PORTABLE URGENT 1V   ORDERED BY: MARA CARTER     PROCEDURE DATE:  08/21/2023          INTERPRETATION:  KUB: AP    COMPARISON: None.    CLINICAL INFORMATION: Fever. Hypotension..    FINDINGS:    There is a nonspecific, nonobstructive bowel gas pattern.  No free intra-abdominal air.  No obvious intra-abdominal masses.  No abnormal calcifications.  The osseous structures are intact.    IMPRESSION:    No acute radiographic abdominal findings.    --- End of Report ---      ARASH MONTGOMERY MD; Attending Radiologist  This document has been electronically signed. Aug 22 2023 12:26PM    < end of copied text >    Imaging  Reviewed:  YES    Consultant(s) Notes Reviewed:   YES

## 2023-08-28 NOTE — PROGRESS NOTE ADULT - PROBLEM SELECTOR PLAN 4
H/o ESRD on HD (T/Th/Sat)  - completed full course of HD on friday 8/25  -HD scheduled for today   - Nephro-Dr. Mosher

## 2023-08-28 NOTE — PROGRESS NOTE ADULT - PROBLEM SELECTOR PLAN 1
- Most likely Acute on chronic combined HF d/t missed HD resulting in volume overload & RLL HAP   - CT A/P noted for RLL PNA.  S/p HAP treatment.  Completed tx for HAP s/p Cefepime & Flagyl.    - resolved   - C/w PRN albuterol & Symbicort   - Nephro-Dr. Mosher,   - Cardiology-Dr. Still   - Pulmonology-Dr. Loredo   - ID-Dr. Newby

## 2023-08-28 NOTE — PROGRESS NOTE ADULT - SUBJECTIVE AND OBJECTIVE BOX
[facility] - POLA Garcia / POLA Doll / AKSHAT Tello/ POLA Maddox/ POLA Young/ ELISA [place] / JARAD Mosher / FLORA Laboy  ---------------------------------------------------------------------------------------------------------------  seen and examined today for ESRD  Interval : NAD  VITALS:  T(F): 97.8 (08-28-23 @ 05:16), Max: 98.8 (08-27-23 @ 20:28)  HR: 75 (08-28-23 @ 09:59)  BP: 170/82 (08-28-23 @ 09:59)  RR: 17 (08-28-23 @ 05:16)  SpO2: 100% (08-28-23 @ 09:59)  Wt(kg): --    Physical Exam :-  Constitutional: NAD  Neck: Supple.  Respiratory: Bilateral equal breath sounds,  Cardiovascular: S1, S2 normal,  Gastrointestinal: Bowel Sounds present, soft, non tender.  Extremities: No edema, L bka  Neurological: Alert and Oriented x 3, no focal deficits  Psychiatric: Normal mood, normal affect  Data:-  Allergies :   No Known Drug Allergies  fish (Rash)  liver (Anaphylaxis)    Hospital Medications:   MEDICATIONS  (STANDING):  albuterol    90 MICROgram(s) HFA Inhaler 2 Puff(s) Inhalation every 6 hours  amLODIPine   Tablet 10 milliGRAM(s) Oral daily  aspirin enteric coated 81 milliGRAM(s) Oral daily  atorvastatin 40 milliGRAM(s) Oral at bedtime  budesonide 160 MICROgram(s)/formoterol 4.5 MICROgram(s) Inhaler 2 Puff(s) Inhalation two times a day  chlorhexidine 2% Cloths 1 Application(s) Topical daily  dextrose 5%. 1000 milliLiter(s) (100 mL/Hr) IV Continuous <Continuous>  dextrose 5%. 1000 milliLiter(s) (50 mL/Hr) IV Continuous <Continuous>  dextrose 50% Injectable 25 Gram(s) IV Push once  dextrose 50% Injectable 12.5 Gram(s) IV Push once  dextrose 50% Injectable 25 Gram(s) IV Push once  epoetin beverley (PROCRIT) Injectable 85747 Unit(s) IV Push <User Schedule>  folic acid 1 milliGRAM(s) Oral daily  furosemide    Tablet 80 milliGRAM(s) Oral daily  glucagon  Injectable 1 milliGRAM(s) IntraMuscular once  hydrALAZINE 25 milliGRAM(s) Oral three times a day  insulin lispro (ADMELOG) corrective regimen sliding scale   SubCutaneous three times a day before meals  insulin lispro (ADMELOG) corrective regimen sliding scale   SubCutaneous at bedtime  lactobacillus acidophilus 1 Tablet(s) Oral two times a day with meals  latanoprost 0.005% Ophthalmic Solution 1 Drop(s) Both EYES at bedtime  levothyroxine 25 MICROGram(s) Oral daily  lidocaine   4% Patch 2 Patch Transdermal daily  LORazepam     Tablet 2 milliGRAM(s) Oral <User Schedule>  melatonin 3 milliGRAM(s) Oral at bedtime  metoprolol succinate ER 50 milliGRAM(s) Oral daily  pantoprazole    Tablet 40 milliGRAM(s) Oral before breakfast  sertraline 200 milliGRAM(s) Oral daily  sevelamer carbonate 800 milliGRAM(s) Oral three times a day with meals  sucralfate 1 Gram(s) Oral four times a day  vancomycin    Solution 125 milliGRAM(s) Oral every 6 hours    08-27    130<L>  |  94<L>  |  35<H>  ----------------------------<  350<H>  4.3   |  23  |  7.82<H>    Ca    8.1<L>      27 Aug 2023 08:03      Creatinine Trend: 7.82 <--, 8.50 <--, 10.70 <--, 12.00 <--                        10.2   1.94  )-----------( 87       ( 27 Aug 2023 08:03 )             33.0

## 2023-08-28 NOTE — PROGRESS NOTE ADULT - SUBJECTIVE AND OBJECTIVE BOX
Time of Visit:  Patient seen and examined.     MEDICATIONS  (STANDING):  albuterol    90 MICROgram(s) HFA Inhaler 2 Puff(s) Inhalation every 6 hours  amLODIPine   Tablet 10 milliGRAM(s) Oral daily  aspirin enteric coated 81 milliGRAM(s) Oral daily  atorvastatin 40 milliGRAM(s) Oral at bedtime  budesonide 160 MICROgram(s)/formoterol 4.5 MICROgram(s) Inhaler 2 Puff(s) Inhalation two times a day  chlorhexidine 2% Cloths 1 Application(s) Topical daily  dextrose 5%. 1000 milliLiter(s) (100 mL/Hr) IV Continuous <Continuous>  dextrose 5%. 1000 milliLiter(s) (50 mL/Hr) IV Continuous <Continuous>  dextrose 50% Injectable 25 Gram(s) IV Push once  dextrose 50% Injectable 12.5 Gram(s) IV Push once  dextrose 50% Injectable 25 Gram(s) IV Push once  epoetin beverley (PROCRIT) Injectable 14045 Unit(s) IV Push <User Schedule>  folic acid 1 milliGRAM(s) Oral daily  furosemide    Tablet 80 milliGRAM(s) Oral daily  glucagon  Injectable 1 milliGRAM(s) IntraMuscular once  hydrALAZINE 25 milliGRAM(s) Oral three times a day  insulin lispro (ADMELOG) corrective regimen sliding scale   SubCutaneous three times a day before meals  insulin lispro (ADMELOG) corrective regimen sliding scale   SubCutaneous at bedtime  lactobacillus acidophilus 1 Tablet(s) Oral two times a day with meals  latanoprost 0.005% Ophthalmic Solution 1 Drop(s) Both EYES at bedtime  levothyroxine 25 MICROGram(s) Oral daily  lidocaine   4% Patch 2 Patch Transdermal daily  LORazepam     Tablet 2 milliGRAM(s) Oral <User Schedule>  melatonin 3 milliGRAM(s) Oral at bedtime  metoprolol succinate ER 50 milliGRAM(s) Oral daily  pantoprazole    Tablet 40 milliGRAM(s) Oral before breakfast  sertraline 200 milliGRAM(s) Oral daily  sevelamer carbonate 800 milliGRAM(s) Oral three times a day with meals  sucralfate 1 Gram(s) Oral four times a day  vancomycin    Solution 125 milliGRAM(s) Oral every 6 hours      MEDICATIONS  (PRN):  acetaminophen     Tablet .. 1000 milliGRAM(s) Oral every 8 hours PRN Moderate Pain (4 - 6)  dextrose Oral Gel 15 Gram(s) Oral once PRN Blood Glucose LESS THAN 70 milliGRAM(s)/deciliter  ondansetron   Disintegrating Tablet 4 milliGRAM(s) Oral two times a day PRN Nausea and/or Vomiting  ondansetron Injectable 4 milliGRAM(s) IV Push every 6 hours PRN Nausea and/or Vomiting       Medications up to date at time of exam.    ROS; No fever, chills, cough congestion .   PHYSICAL EXAMINATION:    Vital Signs Last 24 Hrs  T(C): 36.1 (28 Aug 2023 14:35), Max: 37.1 (27 Aug 2023 20:28)  T(F): 97 (28 Aug 2023 14:35), Max: 98.8 (27 Aug 2023 20:28)  HR: 91 (28 Aug 2023 14:35) (75 - 101)  BP: 109/57 (28 Aug 2023 14:35) (109/57 - 170/82)  BP(mean): 66 (28 Aug 2023 05:16) (66 - 66)  RR: 16 (28 Aug 2023 14:35) (14 - 18)  SpO2: 100% (28 Aug 2023 14:35) (94% - 100%)    Parameters below as of 28 Aug 2023 14:35  Patient On (Oxygen Delivery Method): room air       General : Alert and oriented . Able to answer question with no SOB. No acute distress .     HEENT: Head is normocephalic and atraumatic. No nasal tenderness.  Mucous membranes are moist.     NECK: Supple, no palpable adenopathy.    LUNGS: Clear to auscultation bilaterally with no wheezing, rales, or rhonchi. No use of accessory muscle.     HEART: S1 S2 Regular rate and no click / rub.     ABDOMEN: Soft, nontender, and nondistended. Active bowel sounds.     EXTREMITIES: Left BKA , using rolling walker when OOB .     NEUROLOGIC: Awake, alert, oriented.     SKIN: Warm and moist . Non diaphoretic.           LABS:                        9.9    2.28  )-----------( 90       ( 28 Aug 2023 10:34 )             32.0     08-28    127<L>  |  96  |  45<H>  ----------------------------<  211<H>  6.3<HH>   |  23  |  9.81<H>    Ca    7.9<L>      28 Aug 2023 10:34  Phos  4.6     08-28  Mg     3.1     08-28    TPro  6.5  /  Alb  2.9<L>  /  TBili  0.5  /  DBili  x   /  AST  39  /  ALT  76<H>  /  AlkPhos  107  08-28      Urinalysis Basic - ( 28 Aug 2023 10:34 )    Color: x / Appearance: x / SG: x / pH: x  Gluc: 211 mg/dL / Ketone: x  / Bili: x / Urobili: x   Blood: x / Protein: x / Nitrite: x   Leuk Esterase: x / RBC: x / WBC x   Sq Epi: x / Non Sq Epi: x / Bacteria: x                      MICROBIOLOGY: (if applicable)    RADIOLOGY & ADDITIONAL STUDIES:  EKG:   CXR:  ECHO:    IMPRESSION: 62y Male 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      Osteoarthritis      Vision loss of left eye  blind      ESRD on hemodialysis  T/Th/S      Cataract  both eyes - hx of sx done      BPH (benign prostatic hyperplasia)      UTI (urinary tract infection)  hx of      Bladder mass  hx of      H/O hematuria      Osteoporosis      Vision loss of right eye  decreased      Depression      Chronic GERD      Osteomyelitis of vertebra      CHF (congestive heart failure)      Below knee amputation status, left  2012- pt is wearing prostesis      History of right cataract extraction      History of left cataract extraction      S/P arteriovenous (AV) fistula creation  right arm brachiocephalic arteriovenous fistula on 11/08/2018      H/O hematuria  s/p bladder bx and fulguration 2/25/2020      H/O transurethral destruction of bladder lesion  2020      History of excision of mass  back mass on 03/31/2021      Impression: This is a 62 yr old man  from [facility] with ESRD on HD ( T-Th-Sat ). Current smoker . Presented to ED due to Missed Dialysis, last HD 07-22-23 . Per Patient stated that he often missed HD sessions due to Mild left leg pain and right leg swelling . S/p RRT for SOB with Hypoxia due to Acute Hypoxic Respiratory Failure secondary to Pulmonary edema/ Fluid overload due to Missed Dialysis . CT Abdomen with Small Bilateral Pleural Effusions, Rt Lower Lung with groundglass opacity. No bowel obstruction. Small Gall Stone.   Vomiting due to diabetic gastroparesis vs acute cholecystitis . Had a HAP with s/p Cefepime and Flagyl .       Suggestions:  O2 saturation 97 % room air. So far been saturating good room air.    Neph suggest premedicate with ativan before HD .   Limit fluid intake .  Continue Albuterol 90 mcg 2 puffs Q 6 Hours.   Continue Symbicort 160-4.5 mcg 2 Puffs Twice Daily .    Reinforced the importance of compliance to Dialysis schedule.

## 2023-08-28 NOTE — PROGRESS NOTE ADULT - ASSESSMENT
62 year old male from [facility], walks with RW, with PMH of ESRD on HD (TTS), BKA- L w/prosthetic leg, DM, Left eye blindness, CHF, CAD, HTN, HLD, osteoporosis, bronchitis, current smoker, and anemia who presents with complaint of missed dialysis.    # ESRD. on HD TIW.  continue HD MWF schedule for now  due for HD today   metabolic acidosis from ESRD.  UF with HD   D/W pt compliance with HD   pre-HD Ativan   # anemia of CKD. epogen on HD. Transfuse for HBG <7  #CKDMBD. cont sevelamer  # HTN. blood pressure at goal  # PVD Left BKA, no active issues     D/C planning to nursing home with dialysis capabilities      For any question, call:  Cell # [phone]  Pager # [phone]  Callback # [phone]

## 2023-08-28 NOTE — PROGRESS NOTE ADULT - ASSESSMENT
Patient is a 62 year old male from [facility], walks with RW, with PMH of ESRD on HD (TTS), BKA- L w/prosthetic leg, DM, Left eye blindness, CHF, CAD, HTN, HLD, osteoporosis, bronchitis, current smoker, and anemia. Patient presented to ED with difficulty breathing, SOB, missed dialysis. Patient admitted to medicine for AHRF secondary to fluid overload from missed HD session. Hospital course complicated by hypoglycemia. Cardiology consulted recommending ECHO noted, normal LV fx severe pulm HTN. Keep patient net negative. Pulmonology recommending budesonide and albuterol PRN. Patient hospital course complicated by fever and hypotension, with loose stool. Patient refuse blood culture and IV placement. Patient allowed attempt eventually however difficult venous access and patient refuse further attempts. ID recommended Vancomycin 125mgs po q6hrs for a total of 10 days.    Patient for LTC placement with HD facility.

## 2023-08-28 NOTE — PROGRESS NOTE ADULT - SUBJECTIVE AND OBJECTIVE BOX
Patient is a 62y old  Male who presents with a chief complaint of Missed dialysis (27 Aug 2023 18:00)    PATIENT IS SEEN AND EXAMINED IN MEDICAL FLOOR.  MARIIT [    ]    JEREMY [   ]      GT [   ]    ALLERGIES:  No Known Drug Allergies  fish (Rash)  liver (Anaphylaxis)      Daily     Daily     VITALS:    Vital Signs Last 24 Hrs  T(C): 36.6 (28 Aug 2023 05:16), Max: 37.1 (27 Aug 2023 20:28)  T(F): 97.8 (28 Aug 2023 05:16), Max: 98.8 (27 Aug 2023 20:28)  HR: 80 (28 Aug 2023 05:16) (77 - 101)  BP: 124/59 (28 Aug 2023 05:16) (90/46 - 124/59)  BP(mean): 66 (28 Aug 2023 05:16) (56 - 66)  RR: 17 (28 Aug 2023 05:16) (14 - 17)  SpO2: 98% (28 Aug 2023 05:16) (96% - 100%)    Parameters below as of 28 Aug 2023 05:16  Patient On (Oxygen Delivery Method): nasal cannula  O2 Flow (L/min): 2      LABS:    CBC Full  -  ( 27 Aug 2023 08:03 )  WBC Count : 1.94 K/uL  RBC Count : 3.70 M/uL  Hemoglobin : 10.2 g/dL  Hematocrit : 33.0 %  Platelet Count - Automated : 87 K/uL  Mean Cell Volume : 89.2 fl  Mean Cell Hemoglobin : 27.6 pg  Mean Cell Hemoglobin Concentration : 30.9 gm/dL  Auto Neutrophil # : x  Auto Lymphocyte # : x  Auto Monocyte # : x  Auto Eosinophil # : x  Auto Basophil # : x  Auto Neutrophil % : x  Auto Lymphocyte % : x  Auto Monocyte % : x  Auto Eosinophil % : x  Auto Basophil % : x      08-27    130<L>  |  94<L>  |  35<H>  ----------------------------<  350<H>  4.3   |  23  |  7.82<H>    Ca    8.1<L>      27 Aug 2023 08:03      CAPILLARY BLOOD GLUCOSE      POCT Blood Glucose.: 237 mg/dL (28 Aug 2023 08:13)  POCT Blood Glucose.: 246 mg/dL (27 Aug 2023 21:16)  POCT Blood Glucose.: 207 mg/dL (27 Aug 2023 16:10)  POCT Blood Glucose.: 127 mg/dL (27 Aug 2023 11:45)          Creatinine Trend: 7.82<--, 8.50<--, 10.70<--, 12.00<--, 12.30<--, 10.70<--  I&O's Summary              MEDICATIONS:    MEDICATIONS  (STANDING):  albuterol    90 MICROgram(s) HFA Inhaler 2 Puff(s) Inhalation every 6 hours  amLODIPine   Tablet 10 milliGRAM(s) Oral daily  aspirin enteric coated 81 milliGRAM(s) Oral daily  atorvastatin 40 milliGRAM(s) Oral at bedtime  budesonide 160 MICROgram(s)/formoterol 4.5 MICROgram(s) Inhaler 2 Puff(s) Inhalation two times a day  chlorhexidine 2% Cloths 1 Application(s) Topical daily  dextrose 5%. 1000 milliLiter(s) (100 mL/Hr) IV Continuous <Continuous>  dextrose 5%. 1000 milliLiter(s) (50 mL/Hr) IV Continuous <Continuous>  dextrose 50% Injectable 25 Gram(s) IV Push once  dextrose 50% Injectable 12.5 Gram(s) IV Push once  dextrose 50% Injectable 25 Gram(s) IV Push once  epoetin beverley (PROCRIT) Injectable 45416 Unit(s) IV Push <User Schedule>  folic acid 1 milliGRAM(s) Oral daily  furosemide    Tablet 80 milliGRAM(s) Oral daily  glucagon  Injectable 1 milliGRAM(s) IntraMuscular once  hydrALAZINE 25 milliGRAM(s) Oral three times a day  insulin lispro (ADMELOG) corrective regimen sliding scale   SubCutaneous three times a day before meals  insulin lispro (ADMELOG) corrective regimen sliding scale   SubCutaneous at bedtime  lactobacillus acidophilus 1 Tablet(s) Oral two times a day with meals  latanoprost 0.005% Ophthalmic Solution 1 Drop(s) Both EYES at bedtime  levothyroxine 25 MICROGram(s) Oral daily  lidocaine   4% Patch 2 Patch Transdermal daily  LORazepam     Tablet 2 milliGRAM(s) Oral <User Schedule>  melatonin 3 milliGRAM(s) Oral at bedtime  metoprolol succinate ER 50 milliGRAM(s) Oral daily  pantoprazole    Tablet 40 milliGRAM(s) Oral before breakfast  sertraline 200 milliGRAM(s) Oral daily  sevelamer carbonate 800 milliGRAM(s) Oral three times a day with meals  sucralfate 1 Gram(s) Oral four times a day  vancomycin    Solution 125 milliGRAM(s) Oral every 6 hours      MEDICATIONS  (PRN):  acetaminophen     Tablet .. 1000 milliGRAM(s) Oral every 8 hours PRN Moderate Pain (4 - 6)  dextrose Oral Gel 15 Gram(s) Oral once PRN Blood Glucose LESS THAN 70 milliGRAM(s)/deciliter  ondansetron   Disintegrating Tablet 4 milliGRAM(s) Oral two times a day PRN Nausea and/or Vomiting  ondansetron Injectable 4 milliGRAM(s) IV Push every 6 hours PRN Nausea and/or Vomiting      REVIEW OF SYSTEMS:                           ALL ROS DONE [ X   ]    CONSTITUTIONAL:  LETHARGIC [   ], FEVER [   ], UNRESPONSIVE [   ]  CVS:  CP  [   ], SOB, [   ], PALPITATIONS [   ], DIZZYNESS [   ]  RS: COUGH [   ], SPUTUM [   ]  GI: ABDOMINAL PAIN [   ], NAUSEA [   ], VOMITINGS [   ], DIARRHEA [   ], CONSTIPATION [   ]  :  DYSURIA [   ], NOCTURIA [   ], INCREASED FREQUENCY [   ], DRIBLING [   ],  SKELETAL: PAINFUL JOINTS [   ], SWOLLEN JOINTS [   ], NECK ACHE [   ], LOW BACK ACHE [   ],  SKIN : ULCERS [   ], RASH [   ], ITCHING [   ]  CNS: HEAD ACHE [   ], DOUBLE VISION [   ], BLURRED VISION [   ], AMS / CONFUSION [   ], SEIZURES [   ], WEAKNESS [   ],TINGLING / NUMBNESS [   ]      PHYSICAL EXAM:    GENERAL APPEARANCE: NO DISTRESS,    ANASARCA ++ [IMPROVED]  HEENT:  NO PALLOR, NO  JVD,  NO   NODES, NECK SUPPLE  CVS: S1 +, S2 +,   RS: AEEB,  OCCASIONAL  RALES +,   CRACKLES+ AT LUNG BASES [IMPROVED]  ABD: SOFT, NT, NO, BS +  EXT: LEFT BKA  SKIN: WARM,   SKELETAL:  ROM ACCEPTABLE  CNS:  AAO X  2-3        RADIOLOGY :    RADIOLOGY AND READINGS REVIEWED      ASSESSMENT :     Hypervolemia    Hypertension    Adrenal insufficiency    CKD (chronic kidney disease)    Anemia    Glaucoma    Coronary artery disease    HLD (hyperlipidemia)    Peripheral vascular disease    Spinal stenosis of lumbosacral region    Hyperparathyroidism    Diabetes mellitus    Diabetic neuropathy    Contracture of hand    Osteoarthritis    Osteoporosis    Vision loss of left eye    ESRD on hemodialysis    Cataract    BPH (benign prostatic hyperplasia)    UTI (urinary tract infection)    Bladder mass    H/O hematuria    Osteoporosis    Vision loss of right eye    Depression    Chronic GERD    Osteomyelitis of vertebra    CHF (congestive heart failure)    Below knee amputation status, left    History of right cataract extraction    History of left cataract extraction    S/P arteriovenous (AV) fistula creation    H/O hematuria    H/O transurethral destruction of bladder lesion    History of excision of mass          PLAN:    HPI:  Patient is 62 year old male from [facility], walks with RW, with PMH of ESRD on HD (TTS), BKA- L w/prosthetic leg, DM, Left eye blindness, CHF, CAD, HTN, HLD, osteoporosis, bronchitis, current smoker, and anemia who presents with complaint of missed dialysis. Last HD 7/22. Pt states he often missed HD sessions.  patient states he missed this HD session due to mild pain in left leg, patient remains able to ambulate. Pt complains of right leg swelling and states he does not make any urine. Patient states he was having mild shortness of breath.  Pt denies any fever, chills, N/V/D, constipation, CP, numbness or tingling (26 Jul 2023 19:20)      # DC PLAN IS BEING D/W PATIENT, NEPHROLOGY TEAM  AND SW       # [8/9] MEETING HELD WITH PATIENT, BROTHER ARTEMIO @ [phone] AND SW TEAM. PATIENT W/ HISTORY OF ROUTINE NONCOMPLIANCE W/ LIFE-SUSTAINING TREATMENT [HD], EVALUATIONS AND INTERVENTIONS - COUNSELLED AT LENGTH TO REMAIN COMPLIANT. DISCUSSED THAT PROGNOSIS IS POOR/GRIM GIVEN UNDERLYING MEDICAL CONDITIONS AND GIVEN NONCOMPLIANCE. HE VERBALIZED UNDERSTANDING. REGARDING GOC - PATIENT WISHES FOR FULL CODE. PALLIATIVE CARE CONSULTED. PSYCHIATRY CONSULTED. PATIENT EXPRESSED THAT HE DOES GROW IMPATIENT DURING DIALYSIS SESSIONS AND HAS BEEN LEAVING SESSIONS SOONER THAN PRESCRIBED. IN DISCUSSION THAT RISKS OF DEFERRING COMPLETE HD - INCLUDE BUT ARE NOT LIMITED TO WORSENING CLINICAL CONDITION, ELECTROLYTE ABNORMALITIES, ARRHYTHMIA AND DEATH. HE VERBALIZED UNDERSTANDING. D/W PATIENT THAT REPEATEDLY REFUSING INTERVENTIONS AND ASSESSMENTS IS NOT IN LINE WITH HIS WISHES TO IMPROVE. PATIENT VERBALIZED UNDERSTANDING. DISCUSSED REGARDING CURRENT CLINICAL CONDITION, RECOMMENDED EVALUATIONS AND INTERVENTIONS. ALL QUESTIONS ANSWERED. TEAM HAS OFFERED PATIENT EMOTIONAL SUPPORT AND OFFERED MEDICATION FOR MOOD. OFFERED TO PRE-MEDICATE W/ ANXIOLYTIC PRIOR TO HD. PATIENT IS AGREEABLE.     DISCUSSED THAT PATIENT WILL NEED TO COMPLY WITH HD SESSIONS IN ORDER TO BE MEDICALLY OPTIMIZED FOR DISCHARGE AND FOR SAFE D/C PLANNING. TEAM DISCUSSED OPTIONS OF RETURNING TO [facility] W/ OUTPATIENT HD , IF CARE NEEDS CAN BE SAFELY MET VS. NURSING HOME/[facility] . PATIENT AND BROTHER VERBALIZED UNDERSTANDING.     - SW AND MEDICAL TEAM HAVING ONGOING DISCUSSION WITH PATIENT REGARDING CONSISTENT COMPLIANCE IN ORDER TO RETURN TO HD IN THE OUTPATIENT SETTING  -- CONVEYED THAT PATIENT AND TEAM THAT PATIENT WILL NEED TO CONSISTENTLY COMPLETE FULL HD SESSIONS IN ORDER TO BE CONSIDERED BY OUTPATIENT HD AT NH W/ ONSITE HD OR OUTSIDE HD FACILITY    - PATIENT REQUIRING MORE FREQUENT WEEKLY HD SESSIONS    # DIARRHEA  # R/O INFECTION   # R/O COLITIS    - PATIENT REFUSING IV LINE PLACEMENT AND BLOOD WORK; RISKS OF DEFERRING DISCUSSED, PATIENT VERBALIZED UNDERSTANDING  - PLANNED FOR BLOOD CULTURE, CBC, CMP - WITH HEMODIALYSIS  - PLACED ON PO VANCOMYCIN X 10 DAYS  - ABD XRAY and CXRAY - W/OUT ACUTE FINDINGS  - ID CONSULT    # ACUTE ON CHRONIC HYPOXIC RESPIRATORY FAILURE S/T PULMONARY EDEMA - S/T INCOMPLETE HD SESSIONS ; ANASARCA  # HYPERKALEMIA  # HX OF COPD + S/P RECENT MULTIFOCAL PNEUMONIA ; R/O ASPIRATION PNEUMONIA  # PULMONARY HYPERTENSION  # UNCONTROLLED HTN  # ESRD ON HD TTS - W/ HX OF NONCOMPLIANCE    - TELEMETRY  - HYDRALAZINE, METOPROLOL AND NORVASC ; PRN IV ANTIHYPERTENSIVE  - LOKELMA  - SUPPLEMENTAL O2  - PLANNED FOR HD  - NEPHROLOGY CONSULT  - PULMONOLOGY CONSULT  - ID CONSULT    - CONTINUES TO REFUSE OR PRE-MATURELY DISCONTINUE SESSIONS OF HD       - [7/30] - OVERNIGHT RAPID RESPONSE S/T HYPOXIA - SELF-LIMITED - PATIENT IMPROVED S/P O2 SUPPLEMENT  - S/P CEFEPIME + FLAGYLL, BCX - NGTD      # RECURRENT INTRACTABLE NAUSEA/VOMITING, ? COFFEE GROUND EMESIS  # GASTROPARESIS  # HISTORY OF ESOPHAGITIS AND DUODENITIS [1/2021], HX OF GASTROPARESIS W/ EVIDENCE OF THICKENED ESOPHAGUS ON PREVIOUS CT SCAN   # PANCREAS W/ DOUBLE DUCT SIGN    - MONITORING HGB, PLACED ON PPI BID , CARAFATE QID  - PRN ANTIEMETICS  ; S/P DOSE OF REGLAN [WITH MINIMAL IMPROVEMENT]  - MONITORING FOR SYMPTOMS  - WHILE ON DIET PATIENT REPEATEDLY COUNSELLED TO CHEW FOOD CAUTIOUSLY AND CONSUME SMALL BITES W/ REGULAR CLEARING WITH WATER BETWEEN BITES    - ADVANCE DIET AS TOLERATED  - NOTED CT A/P    - S/P RECENT PRBC TRANSFUSION     - GI CONSULT  - SURGERY CONSULT    - [8/14] - EPISODE OF NAUSEA/VOMITING OVER THE WEEKEND - IMPROVED    # LESS LIKELY CHOLECYSTITIS  - S/P ABX  - NOTED CT A/P  - HIDA SCAN - NEGATIVE  - SURGERY CONSULT    # ELEVATED TROPONINS - ? DEMAND ISCHEMIA    - S/P TELEMETRY  - TREND TROPONINS  - ECHO - TRACE MR, MODERATELY INCREASED LV WALL THICKNESS, NORMAL LV SYSTOLIC FUNCTION, SEVERE PULMONARY HTN  - CARDIOLOGY CONSULT    # EPISODE OF HYPOGLYCEMIA - IMPROVED  # GASTROPARESIS  # UNDERLYING DM  - SSI + FS  - COUNSELLED TO COMPLY WITH HD TO REDUCE UREMIA    - REPEATEDLY COUNSELLED TO COMPLY WITH FINGERSTICKS      # DEPRESSION  - PLACED ON ZOLOFT  - PSYCHIATRY CONSULT    # PATIENT UNDERGOING OUTPATIENT WORKUP FOR CENTRAL VENOUS STENOSIS THROUGH NEPHROLOGY TEAM  - ? s/p STENT PLACEMENT    # ANEMIA OF CKD  # HX OF PANCYTOPENIA   - TREND HGB, TRANSFUSION THRESHOLD HGB < 7; PATIENT STILL INTERMITTENTLY REFUSING BLOODWORK, COUNSELLED TO COMPLY  - TYPE AND SCREEN  - ON EPO  - DENIES RECENT HEMATEMESIS, MELENA, HEMATOCHEZIA    - S/P RECENT PRBC TRANSFUSION  - S/P PRBC TRANSFUSION 7/29    - PPI BID, CARAFATE QID    # SEVERE PROTEIN CALORIE MALNUTRITION, FAILURE TO THRIVE   -  TEMPORAL WASTING, LOSS OF MUSCLE MASS FROM SHOULDER AND HIP GIRDLE  - NUTRITIONAL SUPPLEMENT    # HLD  # PARTIALLY BLIND  # S/P LEFT BKA  # HX OF PVD  # LS SPINAL STENOSIS  # GI AND DVT PPX   Patient is a 62y old  Male who presents with a chief complaint of Missed dialysis (27 Aug 2023 18:00)    PATIENT IS SEEN AND EXAMINED IN MEDICAL FLOOR.      ALLERGIES:  No Known Drug Allergies  fish (Rash)  liver (Anaphylaxis)      VITALS:    Vital Signs Last 24 Hrs  T(C): 36.6 (28 Aug 2023 05:16), Max: 37.1 (27 Aug 2023 20:28)  T(F): 97.8 (28 Aug 2023 05:16), Max: 98.8 (27 Aug 2023 20:28)  HR: 80 (28 Aug 2023 05:16) (77 - 101)  BP: 124/59 (28 Aug 2023 05:16) (90/46 - 124/59)  BP(mean): 66 (28 Aug 2023 05:16) (56 - 66)  RR: 17 (28 Aug 2023 05:16) (14 - 17)  SpO2: 98% (28 Aug 2023 05:16) (96% - 100%)    Parameters below as of 28 Aug 2023 05:16  Patient On (Oxygen Delivery Method): nasal cannula  O2 Flow (L/min): 2      LABS:    CBC Full  -  ( 27 Aug 2023 08:03 )  WBC Count : 1.94 K/uL  RBC Count : 3.70 M/uL  Hemoglobin : 10.2 g/dL  Hematocrit : 33.0 %  Platelet Count - Automated : 87 K/uL  Mean Cell Volume : 89.2 fl  Mean Cell Hemoglobin : 27.6 pg  Mean Cell Hemoglobin Concentration : 30.9 gm/dL  Auto Neutrophil # : x  Auto Lymphocyte # : x  Auto Monocyte # : x  Auto Eosinophil # : x  Auto Basophil # : x  Auto Neutrophil % : x  Auto Lymphocyte % : x  Auto Monocyte % : x  Auto Eosinophil % : x  Auto Basophil % : x      08-27    130<L>  |  94<L>  |  35<H>  ----------------------------<  350<H>  4.3   |  23  |  7.82<H>    Ca    8.1<L>      27 Aug 2023 08:03      CAPILLARY BLOOD GLUCOSE      POCT Blood Glucose.: 237 mg/dL (28 Aug 2023 08:13)  POCT Blood Glucose.: 246 mg/dL (27 Aug 2023 21:16)  POCT Blood Glucose.: 207 mg/dL (27 Aug 2023 16:10)  POCT Blood Glucose.: 127 mg/dL (27 Aug 2023 11:45)          Creatinine Trend: 7.82<--, 8.50<--, 10.70<--, 12.00<--, 12.30<--, 10.70<--  I&O's Summary              MEDICATIONS:    MEDICATIONS  (STANDING):  albuterol    90 MICROgram(s) HFA Inhaler 2 Puff(s) Inhalation every 6 hours  amLODIPine   Tablet 10 milliGRAM(s) Oral daily  aspirin enteric coated 81 milliGRAM(s) Oral daily  atorvastatin 40 milliGRAM(s) Oral at bedtime  budesonide 160 MICROgram(s)/formoterol 4.5 MICROgram(s) Inhaler 2 Puff(s) Inhalation two times a day  chlorhexidine 2% Cloths 1 Application(s) Topical daily  dextrose 5%. 1000 milliLiter(s) (100 mL/Hr) IV Continuous <Continuous>  dextrose 5%. 1000 milliLiter(s) (50 mL/Hr) IV Continuous <Continuous>  dextrose 50% Injectable 25 Gram(s) IV Push once  dextrose 50% Injectable 12.5 Gram(s) IV Push once  dextrose 50% Injectable 25 Gram(s) IV Push once  epoetin beverley (PROCRIT) Injectable 26919 Unit(s) IV Push <User Schedule>  folic acid 1 milliGRAM(s) Oral daily  furosemide    Tablet 80 milliGRAM(s) Oral daily  glucagon  Injectable 1 milliGRAM(s) IntraMuscular once  hydrALAZINE 25 milliGRAM(s) Oral three times a day  insulin lispro (ADMELOG) corrective regimen sliding scale   SubCutaneous three times a day before meals  insulin lispro (ADMELOG) corrective regimen sliding scale   SubCutaneous at bedtime  lactobacillus acidophilus 1 Tablet(s) Oral two times a day with meals  latanoprost 0.005% Ophthalmic Solution 1 Drop(s) Both EYES at bedtime  levothyroxine 25 MICROGram(s) Oral daily  lidocaine   4% Patch 2 Patch Transdermal daily  LORazepam     Tablet 2 milliGRAM(s) Oral <User Schedule>  melatonin 3 milliGRAM(s) Oral at bedtime  metoprolol succinate ER 50 milliGRAM(s) Oral daily  pantoprazole    Tablet 40 milliGRAM(s) Oral before breakfast  sertraline 200 milliGRAM(s) Oral daily  sevelamer carbonate 800 milliGRAM(s) Oral three times a day with meals  sucralfate 1 Gram(s) Oral four times a day  vancomycin    Solution 125 milliGRAM(s) Oral every 6 hours      MEDICATIONS  (PRN):  acetaminophen     Tablet .. 1000 milliGRAM(s) Oral every 8 hours PRN Moderate Pain (4 - 6)  dextrose Oral Gel 15 Gram(s) Oral once PRN Blood Glucose LESS THAN 70 milliGRAM(s)/deciliter  ondansetron   Disintegrating Tablet 4 milliGRAM(s) Oral two times a day PRN Nausea and/or Vomiting  ondansetron Injectable 4 milliGRAM(s) IV Push every 6 hours PRN Nausea and/or Vomiting      REVIEW OF SYSTEMS:                           ALL ROS DONE [ X   ]    CONSTITUTIONAL:  LETHARGIC [   ], FEVER [   ], UNRESPONSIVE [   ]  CVS:  CP  [   ], SOB, [   ], PALPITATIONS [   ], DIZZYNESS [   ]  RS: COUGH [   ], SPUTUM [   ]  GI: ABDOMINAL PAIN [   ], NAUSEA [   ], VOMITINGS [   ], DIARRHEA [   ], CONSTIPATION [   ]  :  DYSURIA [   ], NOCTURIA [   ], INCREASED FREQUENCY [   ], DRIBLING [   ],  SKELETAL: PAINFUL JOINTS [   ], SWOLLEN JOINTS [   ], NECK ACHE [   ], LOW BACK ACHE [   ],  SKIN : ULCERS [   ], RASH [   ], ITCHING [   ]  CNS: HEAD ACHE [   ], DOUBLE VISION [   ], BLURRED VISION [   ], AMS / CONFUSION [   ], SEIZURES [   ], WEAKNESS [   ],TINGLING / NUMBNESS [   ]      PHYSICAL EXAM:    GENERAL APPEARANCE: NO DISTRESS,    ANASARCA ++ [IMPROVED]  HEENT:  NO PALLOR, NO  JVD,  NO   NODES, NECK SUPPLE  CVS: S1 +, S2 +,   RS: AEEB,  OCCASIONAL  RALES +,   CRACKLES+ AT LUNG BASES [IMPROVED]  ABD: SOFT, NT, NO, BS +  EXT: LEFT BKA  SKIN: WARM,   SKELETAL:  ROM ACCEPTABLE  CNS:  AAO X  2-3        RADIOLOGY :    RADIOLOGY AND READINGS REVIEWED      ASSESSMENT :     Hypervolemia    Hypertension    Adrenal insufficiency    CKD (chronic kidney disease)    Anemia    Glaucoma    Coronary artery disease    HLD (hyperlipidemia)    Peripheral vascular disease    Spinal stenosis of lumbosacral region    Hyperparathyroidism    Diabetes mellitus    Diabetic neuropathy    Contracture of hand    Osteoarthritis    Osteoporosis    Vision loss of left eye    ESRD on hemodialysis    Cataract    BPH (benign prostatic hyperplasia)    UTI (urinary tract infection)    Bladder mass    H/O hematuria    Osteoporosis    Vision loss of right eye    Depression    Chronic GERD    Osteomyelitis of vertebra    CHF (congestive heart failure)    Below knee amputation status, left    History of right cataract extraction    History of left cataract extraction    S/P arteriovenous (AV) fistula creation    H/O hematuria    H/O transurethral destruction of bladder lesion    History of excision of mass          PLAN:    HPI:  Patient is 62 year old male from [facility], walks with RW, with PMH of ESRD on HD (TTS), BKA- L w/prosthetic leg, DM, Left eye blindness, CHF, CAD, HTN, HLD, osteoporosis, bronchitis, current smoker, and anemia who presents with complaint of missed dialysis. Last HD 7/22. Pt states he often missed HD sessions.  patient states he missed this HD session due to mild pain in left leg, patient remains able to ambulate. Pt complains of right leg swelling and states he does not make any urine. Patient states he was having mild shortness of breath.  Pt denies any fever, chills, N/V/D, constipation, CP, numbness or tingling (26 Jul 2023 19:20)      # DC PLAN IS BEING D/W PATIENT, NEPHROLOGY TEAM  AND SW       # [8/9] MEETING HELD WITH PATIENT, BROTHER ARTEMIO @ [phone] AND SW TEAM. PATIENT W/ HISTORY OF ROUTINE NONCOMPLIANCE W/ LIFE-SUSTAINING TREATMENT [HD], EVALUATIONS AND INTERVENTIONS - COUNSELLED AT LENGTH TO REMAIN COMPLIANT. DISCUSSED THAT PROGNOSIS IS POOR/GRIM GIVEN UNDERLYING MEDICAL CONDITIONS AND GIVEN NONCOMPLIANCE. HE VERBALIZED UNDERSTANDING. REGARDING GOC - PATIENT WISHES FOR FULL CODE. PALLIATIVE CARE CONSULTED. PSYCHIATRY CONSULTED. PATIENT EXPRESSED THAT HE DOES GROW IMPATIENT DURING DIALYSIS SESSIONS AND HAS BEEN LEAVING SESSIONS SOONER THAN PRESCRIBED. IN DISCUSSION THAT RISKS OF DEFERRING COMPLETE HD - INCLUDE BUT ARE NOT LIMITED TO WORSENING CLINICAL CONDITION, ELECTROLYTE ABNORMALITIES, ARRHYTHMIA AND DEATH. HE VERBALIZED UNDERSTANDING. D/W PATIENT THAT REPEATEDLY REFUSING INTERVENTIONS AND ASSESSMENTS IS NOT IN LINE WITH HIS WISHES TO IMPROVE. PATIENT VERBALIZED UNDERSTANDING. DISCUSSED REGARDING CURRENT CLINICAL CONDITION, RECOMMENDED EVALUATIONS AND INTERVENTIONS. ALL QUESTIONS ANSWERED. TEAM HAS OFFERED PATIENT EMOTIONAL SUPPORT AND OFFERED MEDICATION FOR MOOD. OFFERED TO PRE-MEDICATE W/ ANXIOLYTIC PRIOR TO HD. PATIENT IS AGREEABLE.     DISCUSSED THAT PATIENT WILL NEED TO COMPLY WITH HD SESSIONS IN ORDER TO BE MEDICALLY OPTIMIZED FOR DISCHARGE AND FOR SAFE D/C PLANNING. TEAM DISCUSSED OPTIONS OF RETURNING TO [facility] W/ OUTPATIENT HD , IF CARE NEEDS CAN BE SAFELY MET VS. NURSING HOME/[facility] . PATIENT AND BROTHER VERBALIZED UNDERSTANDING.     - SW AND MEDICAL TEAM HAVING ONGOING DISCUSSION WITH PATIENT REGARDING CONSISTENT COMPLIANCE IN ORDER TO RETURN TO HD IN THE OUTPATIENT SETTING  -- CONVEYED THAT PATIENT AND TEAM THAT PATIENT WILL NEED TO CONSISTENTLY COMPLETE FULL HD SESSIONS IN ORDER TO BE CONSIDERED BY OUTPATIENT HD AT NH W/ ONSITE HD OR OUTSIDE HD FACILITY    - PATIENT REQUIRING MORE FREQUENT WEEKLY HD SESSIONS    # DIARRHEA  # R/O INFECTION   # R/O COLITIS    - PATIENT REFUSING IV LINE PLACEMENT AND BLOOD WORK; RISKS OF DEFERRING DISCUSSED, PATIENT VERBALIZED UNDERSTANDING  - PLANNED FOR BLOOD CULTURE, CBC, CMP - WITH HEMODIALYSIS  - PLACED ON PO VANCOMYCIN X 10 DAYS  - ABD XRAY and CXRAY - W/OUT ACUTE FINDINGS  - ID CONSULT    # ACUTE ON CHRONIC HYPOXIC RESPIRATORY FAILURE S/T PULMONARY EDEMA - S/T INCOMPLETE HD SESSIONS ; ANASARCA  # HYPERKALEMIA  # HX OF COPD + S/P RECENT MULTIFOCAL PNEUMONIA ; R/O ASPIRATION PNEUMONIA  # PULMONARY HYPERTENSION  # UNCONTROLLED HTN  # ESRD ON HD TTS - W/ HX OF NONCOMPLIANCE    - TELEMETRY  - HYDRALAZINE, METOPROLOL AND NORVASC ; PRN IV ANTIHYPERTENSIVE  - LOKELMA  - SUPPLEMENTAL O2  - PLANNED FOR HD  - NEPHROLOGY CONSULT  - PULMONOLOGY CONSULT  - ID CONSULT    - CONTINUES TO REFUSE OR PRE-MATURELY DISCONTINUE SESSIONS OF HD       - [7/30] - OVERNIGHT RAPID RESPONSE S/T HYPOXIA - SELF-LIMITED - PATIENT IMPROVED S/P O2 SUPPLEMENT  - S/P CEFEPIME + FLAGYLL, BCX - NGTD      # RECURRENT INTRACTABLE NAUSEA/VOMITING, ? COFFEE GROUND EMESIS  # GASTROPARESIS  # HISTORY OF ESOPHAGITIS AND DUODENITIS [1/2021], HX OF GASTROPARESIS W/ EVIDENCE OF THICKENED ESOPHAGUS ON PREVIOUS CT SCAN   # PANCREAS W/ DOUBLE DUCT SIGN    - MONITORING HGB, PLACED ON PPI BID , CARAFATE QID  - PRN ANTIEMETICS  ; S/P DOSE OF REGLAN [WITH MINIMAL IMPROVEMENT]  - MONITORING FOR SYMPTOMS  - WHILE ON DIET PATIENT REPEATEDLY COUNSELLED TO CHEW FOOD CAUTIOUSLY AND CONSUME SMALL BITES W/ REGULAR CLEARING WITH WATER BETWEEN BITES    - ADVANCE DIET AS TOLERATED  - NOTED CT A/P    - S/P RECENT PRBC TRANSFUSION     - GI CONSULT  - SURGERY CONSULT    - [8/14] - EPISODE OF NAUSEA/VOMITING OVER THE WEEKEND - IMPROVED    # LESS LIKELY CHOLECYSTITIS  - S/P ABX  - NOTED CT A/P  - HIDA SCAN - NEGATIVE  - SURGERY CONSULT    # ELEVATED TROPONINS - ? DEMAND ISCHEMIA    - S/P TELEMETRY  - TREND TROPONINS  - ECHO - TRACE MR, MODERATELY INCREASED LV WALL THICKNESS, NORMAL LV SYSTOLIC FUNCTION, SEVERE PULMONARY HTN  - CARDIOLOGY CONSULT    # EPISODE OF HYPOGLYCEMIA - IMPROVED  # GASTROPARESIS  # UNDERLYING DM  - SSI + FS  - COUNSELLED TO COMPLY WITH HD TO REDUCE UREMIA    - REPEATEDLY COUNSELLED TO COMPLY WITH FINGERSTICKS      # DEPRESSION  - PLACED ON ZOLOFT  - PSYCHIATRY CONSULT    # PATIENT UNDERGOING OUTPATIENT WORKUP FOR CENTRAL VENOUS STENOSIS THROUGH NEPHROLOGY TEAM  - ? s/p STENT PLACEMENT    # ANEMIA OF CKD  # HX OF PANCYTOPENIA   - TREND HGB, TRANSFUSION THRESHOLD HGB < 7; PATIENT STILL INTERMITTENTLY REFUSING BLOODWORK, COUNSELLED TO COMPLY  - TYPE AND SCREEN  - ON EPO  - DENIES RECENT HEMATEMESIS, MELENA, HEMATOCHEZIA    - S/P RECENT PRBC TRANSFUSION  - S/P PRBC TRANSFUSION 7/29    - PPI BID, CARAFATE QID    # SEVERE PROTEIN CALORIE MALNUTRITION, FAILURE TO THRIVE   -  TEMPORAL WASTING, LOSS OF MUSCLE MASS FROM SHOULDER AND HIP GIRDLE  - NUTRITIONAL SUPPLEMENT    # HLD  # PARTIALLY BLIND  # S/P LEFT BKA  # HX OF PVD  # LS SPINAL STENOSIS  # GI AND DVT PPX

## 2023-08-29 NOTE — PROGRESS NOTE ADULT - SUBJECTIVE AND OBJECTIVE BOX
Patient is a 62y old  Male who presents with a chief complaint of Missed dialysis (28 Aug 2023 14:10)    PATIENT IS SEEN AND EXAMINED IN MEDICAL FLOOR.  SULTANA [    ]    JEREMY [   ]      GT [   ]    ALLERGIES:  No Known Drug Allergies  fish (Rash)  liver (Anaphylaxis)      Daily     Daily Weight in k (28 Aug 2023 13:38)    VITALS:    Vital Signs Last 24 Hrs  T(C): 36.8 (29 Aug 2023 05:30), Max: 37.6 (28 Aug 2023 21:34)  T(F): 98.2 (29 Aug 2023 05:30), Max: 99.7 (28 Aug 2023 21:34)  HR: 86 (29 Aug 2023 05:30) (75 - 97)  BP: 166/84 (29 Aug 2023 05:30) (99/45 - 170/82)  BP(mean): --  RR: 18 (29 Aug 2023 05:30) (16 - 18)  SpO2: 98% (29 Aug 2023 05:30) (94% - 100%)    Parameters below as of 29 Aug 2023 05:30  Patient On (Oxygen Delivery Method): room air        LABS:    CBC Full  -  ( 28 Aug 2023 10:34 )  WBC Count : 2.28 K/uL  RBC Count : 3.61 M/uL  Hemoglobin : 9.9 g/dL  Hematocrit : 32.0 %  Platelet Count - Automated : 90 K/uL  Mean Cell Volume : 88.6 fl  Mean Cell Hemoglobin : 27.4 pg  Mean Cell Hemoglobin Concentration : 30.9 gm/dL  Auto Neutrophil # : 1.81 K/uL  Auto Lymphocyte # : 0.19 K/uL  Auto Monocyte # : 0.12 K/uL  Auto Eosinophil # : 0.14 K/uL  Auto Basophil # : 0.01 K/uL  Auto Neutrophil % : 79.5 %  Auto Lymphocyte % : 8.3 %  Auto Monocyte % : 5.3 %  Auto Eosinophil % : 6.1 %  Auto Basophil % : 0.4 %          127<L>  |  96  |  45<H>  ----------------------------<  211<H>  6.3<HH>   |  23  |  9.81<H>    Ca    7.9<L>      28 Aug 2023 10:34  Phos  4.6       Mg     3.1         TPro  6.5  /  Alb  2.9<L>  /  TBili  0.5  /  DBili  x   /  AST  39  /  ALT  76<H>  /  AlkPhos  107      CAPILLARY BLOOD GLUCOSE      POCT Blood Glucose.: 202 mg/dL (29 Aug 2023 07:39)  POCT Blood Glucose.: 297 mg/dL (28 Aug 2023 22:08)  POCT Blood Glucose.: 292 mg/dL (28 Aug 2023 17:27)  POCT Blood Glucose.: 330 mg/dL (28 Aug 2023 17:24)  POCT Blood Glucose.: 128 mg/dL (28 Aug 2023 12:01)        LIVER FUNCTIONS - ( 28 Aug 2023 10:34 )  Alb: 2.9 g/dL / Pro: 6.5 g/dL / ALK PHOS: 107 U/L / ALT: 76 U/L DA / AST: 39 U/L / GGT: x           Creatinine Trend: 9.81<--, 7.82<--, 8.50<--, 10.70<--, 12.00<--, 12.30<--  I&O's Summary    28 Aug 2023 07:01  -  29 Aug 2023 07:00  --------------------------------------------------------  IN: 0 mL / OUT: 3 mL / NET: -3 mL                MEDICATIONS:    MEDICATIONS  (STANDING):  albuterol    90 MICROgram(s) HFA Inhaler 2 Puff(s) Inhalation every 6 hours  amLODIPine   Tablet 10 milliGRAM(s) Oral daily  aspirin enteric coated 81 milliGRAM(s) Oral daily  atorvastatin 40 milliGRAM(s) Oral at bedtime  budesonide 160 MICROgram(s)/formoterol 4.5 MICROgram(s) Inhaler 2 Puff(s) Inhalation two times a day  chlorhexidine 2% Cloths 1 Application(s) Topical daily  dextrose 5%. 1000 milliLiter(s) (100 mL/Hr) IV Continuous <Continuous>  dextrose 5%. 1000 milliLiter(s) (50 mL/Hr) IV Continuous <Continuous>  dextrose 50% Injectable 25 Gram(s) IV Push once  dextrose 50% Injectable 12.5 Gram(s) IV Push once  dextrose 50% Injectable 25 Gram(s) IV Push once  epoetin beverley (PROCRIT) Injectable 64908 Unit(s) IV Push <User Schedule>  folic acid 1 milliGRAM(s) Oral daily  furosemide    Tablet 80 milliGRAM(s) Oral daily  glucagon  Injectable 1 milliGRAM(s) IntraMuscular once  hydrALAZINE 25 milliGRAM(s) Oral three times a day  insulin lispro (ADMELOG) corrective regimen sliding scale   SubCutaneous three times a day before meals  insulin lispro (ADMELOG) corrective regimen sliding scale   SubCutaneous at bedtime  lactobacillus acidophilus 1 Tablet(s) Oral two times a day with meals  latanoprost 0.005% Ophthalmic Solution 1 Drop(s) Both EYES at bedtime  levothyroxine 25 MICROGram(s) Oral daily  lidocaine   4% Patch 2 Patch Transdermal daily  LORazepam     Tablet 2 milliGRAM(s) Oral <User Schedule>  melatonin 3 milliGRAM(s) Oral at bedtime  metoprolol succinate ER 50 milliGRAM(s) Oral daily  pantoprazole    Tablet 40 milliGRAM(s) Oral before breakfast  sertraline 200 milliGRAM(s) Oral daily  sevelamer carbonate 800 milliGRAM(s) Oral three times a day with meals  sucralfate 1 Gram(s) Oral four times a day  vancomycin    Solution 125 milliGRAM(s) Oral every 6 hours      MEDICATIONS  (PRN):  acetaminophen     Tablet .. 1000 milliGRAM(s) Oral every 8 hours PRN Moderate Pain (4 - 6)  dextrose Oral Gel 15 Gram(s) Oral once PRN Blood Glucose LESS THAN 70 milliGRAM(s)/deciliter  ondansetron   Disintegrating Tablet 4 milliGRAM(s) Oral two times a day PRN Nausea and/or Vomiting  ondansetron Injectable 4 milliGRAM(s) IV Push every 6 hours PRN Nausea and/or Vomiting      REVIEW OF SYSTEMS:                           ALL ROS DONE [ X   ]    CONSTITUTIONAL:  LETHARGIC [   ], FEVER [   ], UNRESPONSIVE [   ]  CVS:  CP  [   ], SOB, [   ], PALPITATIONS [   ], DIZZYNESS [   ]  RS: COUGH [   ], SPUTUM [   ]  GI: ABDOMINAL PAIN [   ], NAUSEA [   ], VOMITINGS [   ], DIARRHEA [   ], CONSTIPATION [   ]  :  DYSURIA [   ], NOCTURIA [   ], INCREASED FREQUENCY [   ], DRIBLING [   ],  SKELETAL: PAINFUL JOINTS [   ], SWOLLEN JOINTS [   ], NECK ACHE [   ], LOW BACK ACHE [   ],  SKIN : ULCERS [   ], RASH [   ], ITCHING [   ]  CNS: HEAD ACHE [   ], DOUBLE VISION [   ], BLURRED VISION [   ], AMS / CONFUSION [   ], SEIZURES [   ], WEAKNESS [   ],TINGLING / NUMBNESS [   ]      PHYSICAL EXAM:    GENERAL APPEARANCE: NO DISTRESS,    ANASARCA ++ [IMPROVED]  HEENT:  NO PALLOR, NO  JVD,  NO   NODES, NECK SUPPLE  CVS: S1 +, S2 +,   RS: AEEB,  OCCASIONAL  RALES +,   CRACKLES+ AT LUNG BASES [IMPROVED]  ABD: SOFT, NT, NO, BS +  EXT: LEFT BKA  SKIN: WARM,   SKELETAL:  ROM ACCEPTABLE  CNS:  AAO X  2-3        RADIOLOGY :    RADIOLOGY AND READINGS REVIEWED      ASSESSMENT :     Hypervolemia    Hypertension    Adrenal insufficiency    CKD (chronic kidney disease)    Anemia    Glaucoma    Coronary artery disease    HLD (hyperlipidemia)    Peripheral vascular disease    Spinal stenosis of lumbosacral region    Hyperparathyroidism    Diabetes mellitus    Diabetic neuropathy    Contracture of hand    Osteoarthritis    Osteoporosis    Vision loss of left eye    ESRD on hemodialysis    Cataract    BPH (benign prostatic hyperplasia)    UTI (urinary tract infection)    Bladder mass    H/O hematuria    Osteoporosis    Vision loss of right eye    Depression    Chronic GERD    Osteomyelitis of vertebra    CHF (congestive heart failure)    Below knee amputation status, left    History of right cataract extraction    History of left cataract extraction    S/P arteriovenous (AV) fistula creation    H/O hematuria    H/O transurethral destruction of bladder lesion    History of excision of mass          PLAN:    HPI:  Patient is 62 year old male from [facility], walks with RW, with PMH of ESRD on HD (TTS), BKA- L w/prosthetic leg, DM, Left eye blindness, CHF, CAD, HTN, HLD, osteoporosis, bronchitis, current smoker, and anemia who presents with complaint of missed dialysis. Last HD . Pt states he often missed HD sessions.  patient states he missed this HD session due to mild pain in left leg, patient remains able to ambulate. Pt complains of right leg swelling and states he does not make any urine. Patient states he was having mild shortness of breath.  Pt denies any fever, chills, N/V/D, constipation, CP, numbness or tingling (2023 19:20)      # DC PLAN IS BEING D/W PATIENT, NEPHROLOGY TEAM  AND        # [] MEETING HELD WITH PATIENT, BROTHER ARTEMIO @ [phone] AND SW TEAM. PATIENT W/ HISTORY OF ROUTINE NONCOMPLIANCE W/ LIFE-SUSTAINING TREATMENT [HD], EVALUATIONS AND INTERVENTIONS - COUNSELLED AT LENGTH TO REMAIN COMPLIANT. DISCUSSED THAT PROGNOSIS IS POOR/GRIM GIVEN UNDERLYING MEDICAL CONDITIONS AND GIVEN NONCOMPLIANCE. HE VERBALIZED UNDERSTANDING. REGARDING GOC - PATIENT WISHES FOR FULL CODE. PALLIATIVE CARE CONSULTED. PSYCHIATRY CONSULTED. PATIENT EXPRESSED THAT HE DOES GROW IMPATIENT DURING DIALYSIS SESSIONS AND HAS BEEN LEAVING SESSIONS SOONER THAN PRESCRIBED. IN DISCUSSION THAT RISKS OF DEFERRING COMPLETE HD - INCLUDE BUT ARE NOT LIMITED TO WORSENING CLINICAL CONDITION, ELECTROLYTE ABNORMALITIES, ARRHYTHMIA AND DEATH. HE VERBALIZED UNDERSTANDING. D/W PATIENT THAT REPEATEDLY REFUSING INTERVENTIONS AND ASSESSMENTS IS NOT IN LINE WITH HIS WISHES TO IMPROVE. PATIENT VERBALIZED UNDERSTANDING. DISCUSSED REGARDING CURRENT CLINICAL CONDITION, RECOMMENDED EVALUATIONS AND INTERVENTIONS. ALL QUESTIONS ANSWERED. TEAM HAS OFFERED PATIENT EMOTIONAL SUPPORT AND OFFERED MEDICATION FOR MOOD. OFFERED TO PRE-MEDICATE W/ ANXIOLYTIC PRIOR TO HD. PATIENT IS AGREEABLE.     DISCUSSED THAT PATIENT WILL NEED TO COMPLY WITH HD SESSIONS IN ORDER TO BE MEDICALLY OPTIMIZED FOR DISCHARGE AND FOR SAFE D/C PLANNING. TEAM DISCUSSED OPTIONS OF RETURNING TO [facility] W/ OUTPATIENT HD , IF CARE NEEDS CAN BE SAFELY MET VS. NURSING HOME/[facility] . PATIENT AND BROTHER VERBALIZED UNDERSTANDING.     - SW AND MEDICAL TEAM HAVING ONGOING DISCUSSION WITH PATIENT REGARDING CONSISTENT COMPLIANCE IN ORDER TO RETURN TO HD IN THE OUTPATIENT SETTING  -- CONVEYED THAT PATIENT AND TEAM THAT PATIENT WILL NEED TO CONSISTENTLY COMPLETE FULL HD SESSIONS IN ORDER TO BE CONSIDERED BY OUTPATIENT HD AT NH W/ ONSITE HD OR OUTSIDE HD FACILITY    - PATIENT REQUIRING MORE FREQUENT WEEKLY HD SESSIONS    # DIARRHEA  # R/O INFECTION   # R/O COLITIS    - PATIENT REFUSING IV LINE PLACEMENT AND BLOOD WORK; RISKS OF DEFERRING DISCUSSED, PATIENT VERBALIZED UNDERSTANDING  - PLANNED FOR BLOOD CULTURE, CBC, CMP - WITH HEMODIALYSIS  - PLACED ON PO VANCOMYCIN X 10 DAYS  - ABD XRAY and CXRAY - W/OUT ACUTE FINDINGS  - ID CONSULT    # ACUTE ON CHRONIC HYPOXIC RESPIRATORY FAILURE S/T PULMONARY EDEMA - S/T INCOMPLETE HD SESSIONS ; ANASARCA  # HYPERKALEMIA  # HX OF COPD + S/P RECENT MULTIFOCAL PNEUMONIA ; R/O ASPIRATION PNEUMONIA  # PULMONARY HYPERTENSION  # UNCONTROLLED HTN  # ESRD ON HD TTS - W/ HX OF NONCOMPLIANCE    - TELEMETRY  - HYDRALAZINE, METOPROLOL AND NORVASC ; PRN IV ANTIHYPERTENSIVE  - LOKELMA  - SUPPLEMENTAL O2  - PLANNED FOR HD  - NEPHROLOGY CONSULT  - PULMONOLOGY CONSULT  - ID CONSULT    - CONTINUES TO REFUSE OR PRE-MATURELY DISCONTINUE SESSIONS OF HD       - [] - OVERNIGHT RAPID RESPONSE S/T HYPOXIA - SELF-LIMITED - PATIENT IMPROVED S/P O2 SUPPLEMENT  - S/P CEFEPIME + FLAGYLL, BCX - NGTD      # RECURRENT INTRACTABLE NAUSEA/VOMITING, ? COFFEE GROUND EMESIS  # GASTROPARESIS  # HISTORY OF ESOPHAGITIS AND DUODENITIS [2021], HX OF GASTROPARESIS W/ EVIDENCE OF THICKENED ESOPHAGUS ON PREVIOUS CT SCAN   # PANCREAS W/ DOUBLE DUCT SIGN    - MONITORING HGB, PLACED ON PPI BID , CARAFATE QID  - PRN ANTIEMETICS  ; S/P DOSE OF REGLAN [WITH MINIMAL IMPROVEMENT]  - MONITORING FOR SYMPTOMS  - WHILE ON DIET PATIENT REPEATEDLY COUNSELLED TO CHEW FOOD CAUTIOUSLY AND CONSUME SMALL BITES W/ REGULAR CLEARING WITH WATER BETWEEN BITES    - ADVANCE DIET AS TOLERATED  - NOTED CT A/P    - S/P RECENT PRBC TRANSFUSION     - GI CONSULT  - SURGERY CONSULT    - [] - EPISODE OF NAUSEA/VOMITING OVER THE WEEKEND - IMPROVED    # LESS LIKELY CHOLECYSTITIS  - S/P ABX  - NOTED CT A/P  - HIDA SCAN - NEGATIVE  - SURGERY CONSULT    # ELEVATED TROPONINS - ? DEMAND ISCHEMIA    - S/P TELEMETRY  - TREND TROPONINS  - ECHO - TRACE MR, MODERATELY INCREASED LV WALL THICKNESS, NORMAL LV SYSTOLIC FUNCTION, SEVERE PULMONARY HTN  - CARDIOLOGY CONSULT    # EPISODE OF HYPOGLYCEMIA - IMPROVED  # GASTROPARESIS  # UNDERLYING DM  - SSI + FS  - COUNSELLED TO COMPLY WITH HD TO REDUCE UREMIA    - REPEATEDLY COUNSELLED TO COMPLY WITH FINGERSTICKS      # DEPRESSION  - PLACED ON ZOLOFT  - PSYCHIATRY CONSULT    # PATIENT UNDERGOING OUTPATIENT WORKUP FOR CENTRAL VENOUS STENOSIS THROUGH NEPHROLOGY TEAM  - ? s/p STENT PLACEMENT    # ANEMIA OF CKD  # HX OF PANCYTOPENIA   - TREND HGB, TRANSFUSION THRESHOLD HGB < 7; PATIENT STILL INTERMITTENTLY REFUSING BLOODWORK, COUNSELLED TO COMPLY  - TYPE AND SCREEN  - ON EPO  - DENIES RECENT HEMATEMESIS, MELENA, HEMATOCHEZIA    - S/P RECENT PRBC TRANSFUSION  - S/P PRBC TRANSFUSION     - PPI BID, CARAFATE QID    # SEVERE PROTEIN CALORIE MALNUTRITION, FAILURE TO THRIVE   -  TEMPORAL WASTING, LOSS OF MUSCLE MASS FROM SHOULDER AND HIP GIRDLE  - NUTRITIONAL SUPPLEMENT    # HLD  # PARTIALLY BLIND  # S/P LEFT BKA  # HX OF PVD  # LS SPINAL STENOSIS  # GI AND DVT PPX   Patient is a 62y old  Male who presents with a chief complaint of Missed dialysis (28 Aug 2023 14:10)    PATIENT IS SEEN AND EXAMINED IN MEDICAL FLOOR.      ALLERGIES:  No Known Drug Allergies  fish (Rash)  liver (Anaphylaxis)      Daily     Daily Weight in k (28 Aug 2023 13:38)    VITALS:    Vital Signs Last 24 Hrs  T(C): 36.8 (29 Aug 2023 05:30), Max: 37.6 (28 Aug 2023 21:34)  T(F): 98.2 (29 Aug 2023 05:30), Max: 99.7 (28 Aug 2023 21:34)  HR: 86 (29 Aug 2023 05:30) (75 - 97)  BP: 166/84 (29 Aug 2023 05:30) (99/45 - 170/82)  BP(mean): --  RR: 18 (29 Aug 2023 05:30) (16 - 18)  SpO2: 98% (29 Aug 2023 05:30) (94% - 100%)    Parameters below as of 29 Aug 2023 05:30  Patient On (Oxygen Delivery Method): room air        LABS:    CBC Full  -  ( 28 Aug 2023 10:34 )  WBC Count : 2.28 K/uL  RBC Count : 3.61 M/uL  Hemoglobin : 9.9 g/dL  Hematocrit : 32.0 %  Platelet Count - Automated : 90 K/uL  Mean Cell Volume : 88.6 fl  Mean Cell Hemoglobin : 27.4 pg  Mean Cell Hemoglobin Concentration : 30.9 gm/dL  Auto Neutrophil # : 1.81 K/uL  Auto Lymphocyte # : 0.19 K/uL  Auto Monocyte # : 0.12 K/uL  Auto Eosinophil # : 0.14 K/uL  Auto Basophil # : 0.01 K/uL  Auto Neutrophil % : 79.5 %  Auto Lymphocyte % : 8.3 %  Auto Monocyte % : 5.3 %  Auto Eosinophil % : 6.1 %  Auto Basophil % : 0.4 %          127<L>  |  96  |  45<H>  ----------------------------<  211<H>  6.3<HH>   |  23  |  9.81<H>    Ca    7.9<L>      28 Aug 2023 10:34  Phos  4.6       Mg     3.1         TPro  6.5  /  Alb  2.9<L>  /  TBili  0.5  /  DBili  x   /  AST  39  /  ALT  76<H>  /  AlkPhos  107      CAPILLARY BLOOD GLUCOSE      POCT Blood Glucose.: 202 mg/dL (29 Aug 2023 07:39)  POCT Blood Glucose.: 297 mg/dL (28 Aug 2023 22:08)  POCT Blood Glucose.: 292 mg/dL (28 Aug 2023 17:27)  POCT Blood Glucose.: 330 mg/dL (28 Aug 2023 17:24)  POCT Blood Glucose.: 128 mg/dL (28 Aug 2023 12:01)        LIVER FUNCTIONS - ( 28 Aug 2023 10:34 )  Alb: 2.9 g/dL / Pro: 6.5 g/dL / ALK PHOS: 107 U/L / ALT: 76 U/L DA / AST: 39 U/L / GGT: x           Creatinine Trend: 9.81<--, 7.82<--, 8.50<--, 10.70<--, 12.00<--, 12.30<--  I&O's Summary    28 Aug 2023 07:01  -  29 Aug 2023 07:00  --------------------------------------------------------  IN: 0 mL / OUT: 3 mL / NET: -3 mL                MEDICATIONS:    MEDICATIONS  (STANDING):  albuterol    90 MICROgram(s) HFA Inhaler 2 Puff(s) Inhalation every 6 hours  amLODIPine   Tablet 10 milliGRAM(s) Oral daily  aspirin enteric coated 81 milliGRAM(s) Oral daily  atorvastatin 40 milliGRAM(s) Oral at bedtime  budesonide 160 MICROgram(s)/formoterol 4.5 MICROgram(s) Inhaler 2 Puff(s) Inhalation two times a day  chlorhexidine 2% Cloths 1 Application(s) Topical daily  dextrose 5%. 1000 milliLiter(s) (100 mL/Hr) IV Continuous <Continuous>  dextrose 5%. 1000 milliLiter(s) (50 mL/Hr) IV Continuous <Continuous>  dextrose 50% Injectable 25 Gram(s) IV Push once  dextrose 50% Injectable 12.5 Gram(s) IV Push once  dextrose 50% Injectable 25 Gram(s) IV Push once  epoetin beverley (PROCRIT) Injectable 87661 Unit(s) IV Push <User Schedule>  folic acid 1 milliGRAM(s) Oral daily  furosemide    Tablet 80 milliGRAM(s) Oral daily  glucagon  Injectable 1 milliGRAM(s) IntraMuscular once  hydrALAZINE 25 milliGRAM(s) Oral three times a day  insulin lispro (ADMELOG) corrective regimen sliding scale   SubCutaneous three times a day before meals  insulin lispro (ADMELOG) corrective regimen sliding scale   SubCutaneous at bedtime  lactobacillus acidophilus 1 Tablet(s) Oral two times a day with meals  latanoprost 0.005% Ophthalmic Solution 1 Drop(s) Both EYES at bedtime  levothyroxine 25 MICROGram(s) Oral daily  lidocaine   4% Patch 2 Patch Transdermal daily  LORazepam     Tablet 2 milliGRAM(s) Oral <User Schedule>  melatonin 3 milliGRAM(s) Oral at bedtime  metoprolol succinate ER 50 milliGRAM(s) Oral daily  pantoprazole    Tablet 40 milliGRAM(s) Oral before breakfast  sertraline 200 milliGRAM(s) Oral daily  sevelamer carbonate 800 milliGRAM(s) Oral three times a day with meals  sucralfate 1 Gram(s) Oral four times a day  vancomycin    Solution 125 milliGRAM(s) Oral every 6 hours      MEDICATIONS  (PRN):  acetaminophen     Tablet .. 1000 milliGRAM(s) Oral every 8 hours PRN Moderate Pain (4 - 6)  dextrose Oral Gel 15 Gram(s) Oral once PRN Blood Glucose LESS THAN 70 milliGRAM(s)/deciliter  ondansetron   Disintegrating Tablet 4 milliGRAM(s) Oral two times a day PRN Nausea and/or Vomiting  ondansetron Injectable 4 milliGRAM(s) IV Push every 6 hours PRN Nausea and/or Vomiting      REVIEW OF SYSTEMS:                           ALL ROS DONE [ X   ]    CONSTITUTIONAL:  LETHARGIC [   ], FEVER [   ], UNRESPONSIVE [   ]  CVS:  CP  [   ], SOB, [   ], PALPITATIONS [   ], DIZZYNESS [   ]  RS: COUGH [   ], SPUTUM [   ]  GI: ABDOMINAL PAIN [   ], NAUSEA [   ], VOMITINGS [   ], DIARRHEA [   ], CONSTIPATION [   ]  :  DYSURIA [   ], NOCTURIA [   ], INCREASED FREQUENCY [   ], DRIBLING [   ],  SKELETAL: PAINFUL JOINTS [   ], SWOLLEN JOINTS [   ], NECK ACHE [   ], LOW BACK ACHE [   ],  SKIN : ULCERS [   ], RASH [   ], ITCHING [   ]  CNS: HEAD ACHE [   ], DOUBLE VISION [   ], BLURRED VISION [   ], AMS / CONFUSION [   ], SEIZURES [   ], WEAKNESS [   ],TINGLING / NUMBNESS [   ]      PHYSICAL EXAM:    GENERAL APPEARANCE: NO DISTRESS  HEENT:  NO PALLOR, NO  JVD,  NO   NODES, NECK SUPPLE  CVS: S1 +, S2 +,   RS: AEEB,  OCCASIONAL  RALES +,   CRACKLES+ AT LUNG BASES [IMPROVED]  ABD: SOFT, NT, NO, BS +  EXT: LEFT BKA  SKIN: WARM,   SKELETAL:  ROM ACCEPTABLE  CNS:  AAO X  2-3        RADIOLOGY :    RADIOLOGY AND READINGS REVIEWED      ASSESSMENT :     Hypervolemia    Hypertension    Adrenal insufficiency    CKD (chronic kidney disease)    Anemia    Glaucoma    Coronary artery disease    HLD (hyperlipidemia)    Peripheral vascular disease    Spinal stenosis of lumbosacral region    Hyperparathyroidism    Diabetes mellitus    Diabetic neuropathy    Contracture of hand    Osteoarthritis    Osteoporosis    Vision loss of left eye    ESRD on hemodialysis    Cataract    BPH (benign prostatic hyperplasia)    UTI (urinary tract infection)    Bladder mass    H/O hematuria    Osteoporosis    Vision loss of right eye    Depression    Chronic GERD    Osteomyelitis of vertebra    CHF (congestive heart failure)    Below knee amputation status, left    History of right cataract extraction    History of left cataract extraction    S/P arteriovenous (AV) fistula creation    H/O hematuria    H/O transurethral destruction of bladder lesion    History of excision of mass          PLAN:    HPI:  Patient is 62 year old male from [facility], walks with RW, with PMH of ESRD on HD (TTS), BKA- L w/prosthetic leg, DM, Left eye blindness, CHF, CAD, HTN, HLD, osteoporosis, bronchitis, current smoker, and anemia who presents with complaint of missed dialysis. Last HD . Pt states he often missed HD sessions.  patient states he missed this HD session due to mild pain in left leg, patient remains able to ambulate. Pt complains of right leg swelling and states he does not make any urine. Patient states he was having mild shortness of breath.  Pt denies any fever, chills, N/V/D, constipation, CP, numbness or tingling (2023 19:20)      # DC PLAN IS BEING D/W PATIENT, NEPHROLOGY TEAM  AND SW       # [] MEETING HELD WITH PATIENT, BROTHER ARTEMIO @ [phone] AND  TEAM. PATIENT W/ HISTORY OF ROUTINE NONCOMPLIANCE W/ LIFE-SUSTAINING TREATMENT [HD], EVALUATIONS AND INTERVENTIONS - COUNSELLED AT LENGTH TO REMAIN COMPLIANT. DISCUSSED THAT PROGNOSIS IS POOR/GRIM GIVEN UNDERLYING MEDICAL CONDITIONS AND GIVEN NONCOMPLIANCE. HE VERBALIZED UNDERSTANDING. REGARDING GOC - PATIENT WISHES FOR FULL CODE. PALLIATIVE CARE CONSULTED. PSYCHIATRY CONSULTED. PATIENT EXPRESSED THAT HE DOES GROW IMPATIENT DURING DIALYSIS SESSIONS AND HAS BEEN LEAVING SESSIONS SOONER THAN PRESCRIBED. IN DISCUSSION THAT RISKS OF DEFERRING COMPLETE HD - INCLUDE BUT ARE NOT LIMITED TO WORSENING CLINICAL CONDITION, ELECTROLYTE ABNORMALITIES, ARRHYTHMIA AND DEATH. HE VERBALIZED UNDERSTANDING. D/W PATIENT THAT REPEATEDLY REFUSING INTERVENTIONS AND ASSESSMENTS IS NOT IN LINE WITH HIS WISHES TO IMPROVE. PATIENT VERBALIZED UNDERSTANDING. DISCUSSED REGARDING CURRENT CLINICAL CONDITION, RECOMMENDED EVALUATIONS AND INTERVENTIONS. ALL QUESTIONS ANSWERED. TEAM HAS OFFERED PATIENT EMOTIONAL SUPPORT AND OFFERED MEDICATION FOR MOOD. OFFERED TO PRE-MEDICATE W/ ANXIOLYTIC PRIOR TO HD. PATIENT IS AGREEABLE.     DISCUSSED THAT PATIENT WILL NEED TO COMPLY WITH HD SESSIONS IN ORDER TO BE MEDICALLY OPTIMIZED FOR DISCHARGE AND FOR SAFE D/C PLANNING. TEAM DISCUSSED OPTIONS OF RETURNING TO [facility] W/ OUTPATIENT HD , IF CARE NEEDS CAN BE SAFELY MET VS. NURSING HOME/[facility] . PATIENT AND BROTHER VERBALIZED UNDERSTANDING.     - SW AND MEDICAL TEAM HAVING ONGOING DISCUSSION WITH PATIENT REGARDING CONSISTENT COMPLIANCE IN ORDER TO RETURN TO HD IN THE OUTPATIENT SETTING  -- CONVEYED THAT PATIENT AND TEAM THAT PATIENT WILL NEED TO CONSISTENTLY COMPLETE FULL HD SESSIONS IN ORDER TO BE CONSIDERED BY OUTPATIENT HD AT NH W/ ONSITE HD OR OUTSIDE HD FACILITY. PATIENT HAS COMPLETED 3 CONSECUTIVE SESSIONS WITH 3 HR OF HD FOR EACH SESSION.     # [] - MEETING PATIENT'S BROTHER ARTEMIO @ [phone] AND  TEAM - CONVEYED THAT PATIENT WISHES TO RETURN TO [facility] W/ OUTPATIENT HD. DISCUSSED WITH BROTHER THAT TEAM RECOMMENDS PATIENT CONSIDER LONG TERM PLACEMENT W/ HD - HOWEVER DESPITE COUNSELLING PATIENT WISHES TO RETURN TO [facility]. BROTHER VERBALIZED UNDERSTANDING AND EXPRESSED THAT AT THIS TIME, GIVEN PATIENT'S WISHES HE IS AGREEABLE WITH PLAN FOR PATIENT TO RETURN TO ASSISTED LIVING. HE ALSO VERBALIZED UNDERSTANDING OF THE RISKS OF DEFERRING LONG TERM PLACEMENT IN NH GIVEN PATIENT'S CARE NEEDS.       # DIARRHEA - IMPROVED  # R/O COLITIS    - PATIENT REFUSING IV LINE PLACEMENT AND BLOOD WORK; RISKS OF DEFERRING DISCUSSED, PATIENT VERBALIZED UNDERSTANDING  - PLACED ON PO VANCOMYCIN X 10 DAYS  - ABD XRAY and CXRAY - W/OUT ACUTE FINDINGS  - ID CONSULT    # ACUTE ON CHRONIC HYPOXIC RESPIRATORY FAILURE S/T PULMONARY EDEMA - S/T INCOMPLETE HD SESSIONS ; ANASARCA - IMPROVED  # HYPERKALEMIA  # HX OF COPD + S/P RECENT MULTIFOCAL PNEUMONIA ; R/O ASPIRATION PNEUMONIA  # PULMONARY HYPERTENSION  # UNCONTROLLED HTN  # ESRD ON HD TTS - W/ HX OF NONCOMPLIANCE    - TELEMETRY  - HYDRALAZINE, METOPROLOL AND NORVASC ; PRN IV ANTIHYPERTENSIVE  - LOKELMA  - SUPPLEMENTAL O2  - PLANNED FOR HD  - NEPHROLOGY CONSULT  - PULMONOLOGY CONSULT  - ID CONSULT    - CONTINUES TO REFUSE OR PRE-MATURELY DISCONTINUE SESSIONS OF HD       - [] - OVERNIGHT RAPID RESPONSE S/T HYPOXIA - SELF-LIMITED - PATIENT IMPROVED S/P O2 SUPPLEMENT  - S/P CEFEPIME + FLAGYLL, BCX - NGTD      # RECURRENT INTRACTABLE NAUSEA/VOMITING, ? COFFEE GROUND EMESIS  # GASTROPARESIS  # HISTORY OF ESOPHAGITIS AND DUODENITIS [2021], HX OF GASTROPARESIS W/ EVIDENCE OF THICKENED ESOPHAGUS ON PREVIOUS CT SCAN   # PANCREAS W/ DOUBLE DUCT SIGN    - MONITORING HGB, PLACED ON PPI BID , CARAFATE QID  - PRN ANTIEMETICS  ; S/P DOSE OF REGLAN [WITH MINIMAL IMPROVEMENT]  - MONITORING FOR SYMPTOMS  - WHILE ON DIET PATIENT REPEATEDLY COUNSELLED TO CHEW FOOD CAUTIOUSLY AND CONSUME SMALL BITES W/ REGULAR CLEARING WITH WATER BETWEEN BITES    - ADVANCE DIET AS TOLERATED  - NOTED CT A/P    - S/P RECENT PRBC TRANSFUSION     - GI CONSULT  - SURGERY CONSULT    - [] - EPISODE OF NAUSEA/VOMITING OVER THE WEEKEND - IMPROVED    # LESS LIKELY CHOLECYSTITIS  - S/P ABX  - NOTED CT A/P  - HIDA SCAN - NEGATIVE  - SURGERY CONSULT    # ELEVATED TROPONINS - ? DEMAND ISCHEMIA    - S/P TELEMETRY  - TREND TROPONINS  - ECHO - TRACE MR, MODERATELY INCREASED LV WALL THICKNESS, NORMAL LV SYSTOLIC FUNCTION, SEVERE PULMONARY HTN  - CARDIOLOGY CONSULT    # EPISODE OF HYPOGLYCEMIA - IMPROVED  # GASTROPARESIS  # UNDERLYING DM  - SSI + FS  - COUNSELLED TO COMPLY WITH HD TO REDUCE UREMIA    - REPEATEDLY COUNSELLED TO COMPLY WITH FINGERSTICKS      # DEPRESSION  - PLACED ON ZOLOFT  - PSYCHIATRY CONSULT    # PATIENT UNDERGOING OUTPATIENT WORKUP FOR CENTRAL VENOUS STENOSIS THROUGH NEPHROLOGY TEAM  - ? s/p STENT PLACEMENT    # ANEMIA OF CKD  # HX OF PANCYTOPENIA   - TREND HGB, TRANSFUSION THRESHOLD HGB < 7; PATIENT STILL INTERMITTENTLY REFUSING BLOODWORK, COUNSELLED TO COMPLY  - TYPE AND SCREEN  - ON EPO  - DENIES RECENT HEMATEMESIS, MELENA, HEMATOCHEZIA    - S/P RECENT PRBC TRANSFUSION  - S/P PRBC TRANSFUSION     - PPI BID, CARAFATE QID    # SEVERE PROTEIN CALORIE MALNUTRITION, FAILURE TO THRIVE   -  TEMPORAL WASTING, LOSS OF MUSCLE MASS FROM SHOULDER AND HIP GIRDLE  - NUTRITIONAL SUPPLEMENT    # HLD  # PARTIALLY BLIND  # S/P LEFT BKA  # HX OF PVD  # LS SPINAL STENOSIS  # GI AND DVT PPX

## 2023-08-29 NOTE — PROGRESS NOTE ADULT - SUBJECTIVE AND OBJECTIVE BOX
[facility] - POLA Garcia / POLA Doll / AKSHAT Tello/ POLA Maddox/ POLA Young/ ELISA [place] / JARAD Mosher / FLORA Laboy  ---------------------------------------------------------------------------------------------------------------  seen and examined today for ESRD  Interval : NAD  VITALS:  T(F): 98.2 (08-29-23 @ 05:30), Max: 99.7 (08-28-23 @ 21:34)  HR: 86 (08-29-23 @ 05:30)  BP: 166/84 (08-29-23 @ 05:30)  RR: 18 (08-29-23 @ 05:30)  SpO2: 98% (08-29-23 @ 05:30)  Wt(kg): --    08-28 @ 07:01  -  08-29 @ 07:00  --------------------------------------------------------  IN: 0 mL / OUT: 3 mL / NET: -3 mL      Physical Exam :-  Constitutional: NAD  Neck: Supple.  Respiratory: Bilateral equal breath sounds,  Cardiovascular: S1, S2 normal,  Gastrointestinal: Bowel Sounds present, soft, non tender.  Extremities: No edema, L BKA  Neurological: Alert and Oriented x 3, no focal deficits  Psychiatric: Normal mood, normal affect  Data:-  Allergies :   No Known Drug Allergies  fish (Rash)  liver (Anaphylaxis)    Hospital Medications:   MEDICATIONS  (STANDING):  albuterol    90 MICROgram(s) HFA Inhaler 2 Puff(s) Inhalation every 6 hours  amLODIPine   Tablet 10 milliGRAM(s) Oral daily  aspirin enteric coated 81 milliGRAM(s) Oral daily  atorvastatin 40 milliGRAM(s) Oral at bedtime  budesonide 160 MICROgram(s)/formoterol 4.5 MICROgram(s) Inhaler 2 Puff(s) Inhalation two times a day  chlorhexidine 2% Cloths 1 Application(s) Topical daily  dextrose 5%. 1000 milliLiter(s) (100 mL/Hr) IV Continuous <Continuous>  dextrose 5%. 1000 milliLiter(s) (50 mL/Hr) IV Continuous <Continuous>  dextrose 50% Injectable 25 Gram(s) IV Push once  dextrose 50% Injectable 12.5 Gram(s) IV Push once  dextrose 50% Injectable 25 Gram(s) IV Push once  epoetin beverley (PROCRIT) Injectable 28915 Unit(s) IV Push <User Schedule>  folic acid 1 milliGRAM(s) Oral daily  furosemide    Tablet 80 milliGRAM(s) Oral daily  glucagon  Injectable 1 milliGRAM(s) IntraMuscular once  hydrALAZINE 25 milliGRAM(s) Oral three times a day  insulin lispro (ADMELOG) corrective regimen sliding scale   SubCutaneous three times a day before meals  insulin lispro (ADMELOG) corrective regimen sliding scale   SubCutaneous at bedtime  lactobacillus acidophilus 1 Tablet(s) Oral two times a day with meals  latanoprost 0.005% Ophthalmic Solution 1 Drop(s) Both EYES at bedtime  levothyroxine 25 MICROGram(s) Oral daily  lidocaine   4% Patch 2 Patch Transdermal daily  LORazepam     Tablet 2 milliGRAM(s) Oral <User Schedule>  melatonin 3 milliGRAM(s) Oral at bedtime  metoprolol succinate ER 50 milliGRAM(s) Oral daily  pantoprazole    Tablet 40 milliGRAM(s) Oral before breakfast  sertraline 200 milliGRAM(s) Oral daily  sevelamer carbonate 800 milliGRAM(s) Oral three times a day with meals  sucralfate 1 Gram(s) Oral four times a day  vancomycin    Solution 125 milliGRAM(s) Oral every 6 hours    08-28    127<L>  |  96  |  45<H>  ----------------------------<  211<H>  6.3<HH>   |  23  |  9.81<H>    Ca    7.9<L>      28 Aug 2023 10:34  Phos  4.6     08-28  Mg     3.1     08-28    TPro  6.5  /  Alb  2.9<L>  /  TBili  0.5  /  DBili      /  AST  39  /  ALT  76<H>  /  AlkPhos  107  08-28    Creatinine Trend: 9.81 <--, 7.82 <--, 8.50 <--, 10.70 <--                        9.9    2.28  )-----------( 90       ( 28 Aug 2023 10:34 )             32.0

## 2023-08-29 NOTE — ED ADULT NURSE NOTE - PRO INTERPRETER NEED 2
Patient would like a call from Dr. Rodarte' staff regarding an after visit summary patient has from May 2023. Summary was printed from a visit in [place] and the previous problem list shows macular degeneration in the list. Patient states she has never been told she has this and would like to talk with staff.    English

## 2023-08-29 NOTE — PROGRESS NOTE ADULT - NS ATTEND AMEND GEN_ALL_CORE FT
Agree with above  No chest pain or anginal symptoms   Keep net negative per renal     Sandra Floyd MD
Impression: This is a 61 Y/O Male from [facility] with ESRD on HD ( T-Th-Sat ). Current smoker . Presented to ED due to Missed Dialysis, last HD 07-22-23 . Per Patient stated that he often missed HD sessions due to Mild left leg pain and right leg swelling . S/p RRT for SOB with Hypoxia due to Acute Hypoxic Respiratory Failure secondary to Pulmonary edema/ Fluid overload due to Missed Dialysis . CT Abdomen with Small Bilateral Pleural Effusions, Rt Lower Lung with groundglass opacity. No bowel obstruction. Small Gall Stone.   Vomiting due to diabetic gastroparesis vs acute cholecystitis  unlikely SBO .      - Pat had 2.5 hours of HD after premed with ativan   - Continue HD as per Neph   - Increase ativan 2 mg pre- dialysis   - 1:1 observation   - Asp precaution   - Continue Symbicort and neb albuterol
Impression: This is a 62 yr old man  from [facility] with ESRD on HD ( T-Th-Sat ). Current smoker . Presented to ED due to Missed Dialysis, last HD 07-22-23 . Per Patient stated that he often missed HD sessions due to Mild left leg pain and right leg swelling . S/p RRT for SOB with Hypoxia due to Acute Hypoxic Respiratory Failure secondary to Pulmonary edema/ Fluid overload due to Missed Dialysis . CT Abdomen with Small Bilateral Pleural Effusions, Rt Lower Lung with groundglass opacity. No bowel obstruction. Small Gall Stone.   Vomiting due to diabetic gastroparesis vs acute cholecystitis       Suggestions:  O2 saturation 100% room air. So far been saturating good room air.    Neph suggest premedicate with ativan before HD    Limit fluid intake   Continue Albuterol 90 mcg 2 puffs Q 6 Hours.   Continue Symbicort 160-4.5 mcg 2 Puffs Twice Daily .    Reinforced the importance of compliance to Dialysis schedule.
Impression: This is a 62 yr old man  from [facility] with ESRD on HD ( T-Th-Sat ). Current smoker . Presented to ED due to Missed Dialysis, last HD 07-22-23 . Per Patient stated that he often missed HD sessions due to Mild left leg pain and right leg swelling . S/p RRT for SOB with Hypoxia due to Acute Hypoxic Respiratory Failure secondary to Pulmonary edema/ Fluid overload due to Missed Dialysis . CT Abdomen with Small Bilateral Pleural Effusions, Rt Lower Lung with groundglass opacity. No bowel obstruction. Small Gall Stone.         Suggestions:  O2 saturation 96% with O2 supplement. Continue Oxygen supplementation 2L NC.   Reinforced the importance of compliance to Dialysis schedule .   Continue Albuterol 90 mcg 2 Puffs Q 6 Hours .  Continue Symbicort 160-4.5 mcg 2 Puffs twice Daily.     On lasix 40 mg Oral Daily.    Aspiration precaution with HOB elevation especially during meal times.
Impression: This is a 61 Y/O Male from [facility] [place] with ESRD on HD ( T-Th-Sat ). Current smoker . Presented to ED due to Missed Dialysis, last HD 07-22-23 . Per Patient stated that he often missed HD sessions due to Mild left leg pain and right leg swelling . S/p RRT for SOB with Hypoxia due to Acute Hypoxic Respiratory Failure secondary to Pulmonary edema/ Fluid overload due to Missed Dialysis . CT Abdomen with Small Bilateral Pleural Effusions, Rt Lower Lung with groundglass opacity. No bowel obstruction. Small Gall Stone.   Vomiting due to diabetic gastroparesis vs acute cholecystitis  unlikely SBO .       Suggestions:  O2 saturation 96% with O2 supplement. Continue Oxygen supplementation 2L NC.   Continue Duoneb via nebulization Q 6 Hours .   On lasix 40 mg IVP Daily .  Reinforced the importance of compliance to Dialysis schedule.   Aspiration precaution with HOB elevation especially during meal times
Impression: This is a 62 yr old man  from [facility] with ESRD on HD ( T-Th-Sat ). Current smoker . Presented to ED due to Missed Dialysis, last HD 07-22-23 . Per Patient stated that he often missed HD sessions due to Mild left leg pain and right leg swelling . S/p RRT for SOB with Hypoxia due to Acute Hypoxic Respiratory Failure secondary to Pulmonary edema/ Fluid overload due to Missed Dialysis . CT Abdomen with Small Bilateral Pleural Effusions, Rt Lower Lung with groundglass opacity. No bowel obstruction. Small Gall Stone.   Vomiting due to diabetic gastroparesis vs acute cholecystitis . Had a HAP with s/p Cefepime and Flagyl .       Suggestions:  O2 saturation 98 % room air. So far been saturating good room air.    Neph suggest premedicate with ativan before HD .   Limit fluid intake .  Continue Albuterol 90 mcg 2 puffs Q 6 Hours.   Continue Symbicort 160-4.5 mcg 2 Puffs Twice Daily .    Reinforced the importance of compliance to Dialysis schedule.
Impression: This is a 62 yr old man  from [facility] with ESRD on HD ( T-Th-Sat ). Current smoker . Presented to ED due to Missed Dialysis, last HD 07-22-23 . Per Patient stated that he often missed HD sessions due to Mild left leg pain and right leg swelling . S/p RRT for SOB with Hypoxia due to Acute Hypoxic Respiratory Failure secondary to Pulmonary edema/ Fluid overload due to Missed Dialysis . CT Abdomen with Small Bilateral Pleural Effusions, Rt Lower Lung with groundglass opacity. No bowel obstruction. Small Gall Stone.   Vomiting due to diabetic gastroparesis vs acute cholecystitis . Had a HAP with s/p Cefepime and Flagyl .       Suggestions:  O2 saturation 98 % room air. So far been saturating good room air.    Neph suggest premedicate with ativan before HD .   Limit fluid intake .  Continue Albuterol 90 mcg 2 puffs Q 6 Hours.   Continue Symbicort 160-4.5 mcg 2 Puffs Twice Daily .    Reinforced the importance of compliance to Dialysis schedule.
Impression: This is a 62 yr old man  from [facility] with ESRD on HD ( T-Th-Sat ). Current smoker . Presented to ED due to Missed Dialysis, last HD 07-22-23 . Per Patient stated that he often missed HD sessions due to Mild left leg pain and right leg swelling . S/p RRT for SOB with Hypoxia due to Acute Hypoxic Respiratory Failure secondary to Pulmonary edema/ Fluid overload due to Missed Dialysis . CT Abdomen with Small Bilateral Pleural Effusions, Rt Lower Lung with groundglass opacity. No bowel obstruction. Small Gall Stone.   Vomiting due to diabetic gastroparesis vs acute cholecystitis . Had a HAP with s/p Cefepime and Flagyl .       Suggestions:  O2 saturation 97 % room air. So far been saturating good room air.    Neph suggest premedicate with ativan before HD .   Limit fluid intake .  Continue Albuterol 90 mcg 2 puffs Q 6 Hours.   Continue Symbicort 160-4.5 mcg 2 Puffs Twice Daily .    Reinforced the importance of compliance to Dialysis schedule.
states his major concern is low back pain and right flank pain.  wants to "go home".  does not want to talk about dialysis scheduling.  no active CV issues today.
Patient seen and examined.  Agree with above.   Continue with fluid removal with HD and diuretics per renal   Follow up renal     Sandra Floyd MD
sleeping comfortably. awaiting next dialysis session?

## 2023-08-29 NOTE — PROGRESS NOTE ADULT - SUBJECTIVE AND OBJECTIVE BOX
NP Note discussed with  primary attending    Patient is a 62y old  Male who presents with a chief complaint of Missed dialysis (29 Aug 2023 13:45)      INTERVAL HPI/OVERNIGHT EVENTS: Pt offered no new complaints, denied diarrhea or pain.      MEDICATIONS  (STANDING):  albuterol    90 MICROgram(s) HFA Inhaler 2 Puff(s) Inhalation every 6 hours  amLODIPine   Tablet 10 milliGRAM(s) Oral daily  aspirin enteric coated 81 milliGRAM(s) Oral daily  atorvastatin 40 milliGRAM(s) Oral at bedtime  budesonide 160 MICROgram(s)/formoterol 4.5 MICROgram(s) Inhaler 2 Puff(s) Inhalation two times a day  chlorhexidine 2% Cloths 1 Application(s) Topical daily  dextrose 5%. 1000 milliLiter(s) (100 mL/Hr) IV Continuous <Continuous>  dextrose 5%. 1000 milliLiter(s) (50 mL/Hr) IV Continuous <Continuous>  dextrose 50% Injectable 25 Gram(s) IV Push once  dextrose 50% Injectable 12.5 Gram(s) IV Push once  dextrose 50% Injectable 25 Gram(s) IV Push once  epoetin beverley (PROCRIT) Injectable 50151 Unit(s) IV Push <User Schedule>  folic acid 1 milliGRAM(s) Oral daily  furosemide    Tablet 80 milliGRAM(s) Oral daily  glucagon  Injectable 1 milliGRAM(s) IntraMuscular once  hydrALAZINE 25 milliGRAM(s) Oral three times a day  insulin lispro (ADMELOG) corrective regimen sliding scale   SubCutaneous three times a day before meals  insulin lispro (ADMELOG) corrective regimen sliding scale   SubCutaneous at bedtime  lactobacillus acidophilus 1 Tablet(s) Oral two times a day with meals  latanoprost 0.005% Ophthalmic Solution 1 Drop(s) Both EYES at bedtime  levothyroxine 25 MICROGram(s) Oral daily  lidocaine   4% Patch 2 Patch Transdermal daily  LORazepam     Tablet 2 milliGRAM(s) Oral <User Schedule>  melatonin 3 milliGRAM(s) Oral at bedtime  metoprolol succinate ER 50 milliGRAM(s) Oral daily  pantoprazole    Tablet 40 milliGRAM(s) Oral before breakfast  sertraline 200 milliGRAM(s) Oral daily  sevelamer carbonate 800 milliGRAM(s) Oral three times a day with meals  sucralfate 1 Gram(s) Oral four times a day  vancomycin    Solution 125 milliGRAM(s) Oral every 6 hours    MEDICATIONS  (PRN):  acetaminophen     Tablet .. 1000 milliGRAM(s) Oral every 8 hours PRN Moderate Pain (4 - 6)  dextrose Oral Gel 15 Gram(s) Oral once PRN Blood Glucose LESS THAN 70 milliGRAM(s)/deciliter  ondansetron   Disintegrating Tablet 4 milliGRAM(s) Oral two times a day PRN Nausea and/or Vomiting  ondansetron Injectable 4 milliGRAM(s) IV Push every 6 hours PRN Nausea and/or Vomiting      __________________________________________________  REVIEW OF SYSTEMS:    CONSTITUTIONAL: No fever  EYES: No acute visual disturbances  NECK: No pain or stiffness  RESPIRATORY: No cough; No shortness of breath  CARDIOVASCULAR: No chest pain, no palpitations  GASTROINTESTINAL: No pain. No nausea or vomiting.  No diarrhea   NEUROLOGICAL: No headache or numbness, no tremors  MUSCULOSKELETAL: No joint pain, no muscle pain  GENITOURINARY: No dysuria, no frequency, no hesitancy  PSYCHIATRY: No depression , no anxiety  ALL OTHER  ROS negative        Vital Signs Last 24 Hrs  T(C): 36.8 (29 Aug 2023 05:30), Max: 37.6 (28 Aug 2023 21:34)  T(F): 98.2 (29 Aug 2023 05:30), Max: 99.7 (28 Aug 2023 21:34)  HR: 84 (29 Aug 2023 14:00) (84 - 97)  BP: 111/63 (29 Aug 2023 14:00) (99/45 - 166/84)  BP(mean): --  RR: 18 (29 Aug 2023 14:00) (17 - 18)  SpO2: 99% (29 Aug 2023 14:00) (96% - 99%)    Parameters below as of 29 Aug 2023 14:00  Patient On (Oxygen Delivery Method): room air        ________________________________________________  PHYSICAL EXAM:  GENERAL: NAD  HEENT: Normocephalic;  conjunctivae and sclerae clear; moist mucous membranes   NECK : Supple  CHEST/LUNG: Clear to auscultation bilaterally with good air entry   HEART: S1 S2  regular; no murmurs, gallops or rubs  ABDOMEN: Soft, Nontender, Nondistended; Bowel sounds present x 4 quad  EXTREMITIES: No cyanosis; no edema; no calf tenderness.  (+) R. AVF.  (+) L. BKA.  SKIN: Warm and dry; no rash  NERVOUS SYSTEM:  Awake and alert; Oriented to place, person and time; no new deficits  _________________________________________________  LABS:                        9.9    2.28  )-----------( 90       ( 28 Aug 2023 10:34 )             32.0     08-28    127<L>  |  96  |  45<H>  ----------------------------<  211<H>  6.3<HH>   |  23  |  9.81<H>    Ca    7.9<L>      28 Aug 2023 10:34  Phos  4.6     08-28  Mg     3.1     08-28    TPro  6.5  /  Alb  2.9<L>  /  TBili  0.5  /  DBili  x   /  AST  39  /  ALT  76<H>  /  AlkPhos  107  08-28      Urinalysis Basic - ( 28 Aug 2023 10:34 )    Color: x / Appearance: x / SG: x / pH: x  Gluc: 211 mg/dL / Ketone: x  / Bili: x / Urobili: x   Blood: x / Protein: x / Nitrite: x   Leuk Esterase: x / RBC: x / WBC x   Sq Epi: x / Non Sq Epi: x / Bacteria: x      CAPILLARY BLOOD GLUCOSE      POCT Blood Glucose.: 251 mg/dL (29 Aug 2023 12:22)  POCT Blood Glucose.: 202 mg/dL (29 Aug 2023 07:39)  POCT Blood Glucose.: 297 mg/dL (28 Aug 2023 22:08)  POCT Blood Glucose.: 292 mg/dL (28 Aug 2023 17:27)  POCT Blood Glucose.: 330 mg/dL (28 Aug 2023 17:24)        RADIOLOGY & ADDITIONAL TESTS:    Imaging Personally Reviewed:  YES    Consultant(s) Notes Reviewed:   YES    Care Discussed with Consultants :     Plan of care was discussed with patient and /or primary care giver; all questions and concerns were addressed and care was aligned with patient's wishes.

## 2023-08-29 NOTE — PROGRESS NOTE ADULT - PROBLEM SELECTOR PROBLEM 11
Gastroparesis
Hyperphosphatemia
CAD (coronary artery disease)
Hyperphosphatemia
CAD (coronary artery disease)
CAD (coronary artery disease)
Hyperphosphatemia

## 2023-08-29 NOTE — PROGRESS NOTE ADULT - PROBLEM SELECTOR PLAN 2
- Resolved  - S/p episode of fever, loose stools  - S/p Abd xray unremarkable   - ID recommended Vancomycin 125mgs po q6hrs for a total of 10 days.

## 2023-08-29 NOTE — PROGRESS NOTE ADULT - ASSESSMENT
62 year old male from [facility], walks with RW, with PMH of ESRD on HD (TTS), BKA- L w/prosthetic leg, DM, Left eye blindness, CHF, CAD, HTN, HLD, osteoporosis, bronchitis, current smoker, and anemia who presents with complaint of missed dialysis.    # ESRD. on HD TIW.  continue HD MWF schedule  hyperkalemic yesterday pre HD, pending new labs post HD with 1k bath  due for HD tomorrow   metabolic acidosis from ESRD.  UF with HD   D/W pt compliance with HD   pre-HD Ativan   # anemia of CKD. epogen on HD. Transfuse for HBG <7  #CKDMBD. cont sevelamer  # HTN. blood pressure at goal  # PVD Left BKA, no active issues     D/C planning to nursing home with dialysis capabilities      For any question, call:  Cell # [phone]  Pager # [phone]  Callback # [phone]

## 2023-08-29 NOTE — PROGRESS NOTE ADULT - PROBLEM SELECTOR PLAN 1
- Resolved   - Most likely Acute on chronic combined HF d/t missed HD resulting in volume overload & RLL HAP   - CT A/P noted for RLL PNA.  S/p HAP treatment.  Completed tx for HAP s/p Cefepime & Flagyl.    - C/w PRN albuterol & Symbicort   - Nephro-Dr. Mosher,   - Cardiology-Dr. Still   - Pulmonology-Dr. Loredo   - ID-Dr. Newby Calm

## 2023-08-29 NOTE — PROGRESS NOTE ADULT - SUBJECTIVE AND OBJECTIVE BOX
Time of Visit:  Patient seen and examined.     MEDICATIONS  (STANDING):  albuterol    90 MICROgram(s) HFA Inhaler 2 Puff(s) Inhalation every 6 hours  amLODIPine   Tablet 10 milliGRAM(s) Oral daily  aspirin enteric coated 81 milliGRAM(s) Oral daily  atorvastatin 40 milliGRAM(s) Oral at bedtime  budesonide 160 MICROgram(s)/formoterol 4.5 MICROgram(s) Inhaler 2 Puff(s) Inhalation two times a day  chlorhexidine 2% Cloths 1 Application(s) Topical daily  dextrose 5%. 1000 milliLiter(s) (100 mL/Hr) IV Continuous <Continuous>  dextrose 5%. 1000 milliLiter(s) (50 mL/Hr) IV Continuous <Continuous>  dextrose 50% Injectable 25 Gram(s) IV Push once  dextrose 50% Injectable 12.5 Gram(s) IV Push once  dextrose 50% Injectable 25 Gram(s) IV Push once  epoetin beverley (PROCRIT) Injectable 38618 Unit(s) IV Push <User Schedule>  folic acid 1 milliGRAM(s) Oral daily  furosemide    Tablet 80 milliGRAM(s) Oral daily  glucagon  Injectable 1 milliGRAM(s) IntraMuscular once  hydrALAZINE 25 milliGRAM(s) Oral three times a day  insulin lispro (ADMELOG) corrective regimen sliding scale   SubCutaneous three times a day before meals  insulin lispro (ADMELOG) corrective regimen sliding scale   SubCutaneous at bedtime  lactobacillus acidophilus 1 Tablet(s) Oral two times a day with meals  latanoprost 0.005% Ophthalmic Solution 1 Drop(s) Both EYES at bedtime  levothyroxine 25 MICROGram(s) Oral daily  lidocaine   4% Patch 2 Patch Transdermal daily  LORazepam     Tablet 2 milliGRAM(s) Oral <User Schedule>  melatonin 3 milliGRAM(s) Oral at bedtime  metoprolol succinate ER 50 milliGRAM(s) Oral daily  pantoprazole    Tablet 40 milliGRAM(s) Oral before breakfast  sertraline 200 milliGRAM(s) Oral daily  sevelamer carbonate 800 milliGRAM(s) Oral three times a day with meals  sucralfate 1 Gram(s) Oral four times a day  vancomycin    Solution 125 milliGRAM(s) Oral every 6 hours      MEDICATIONS  (PRN):  acetaminophen     Tablet .. 1000 milliGRAM(s) Oral every 8 hours PRN Moderate Pain (4 - 6)  dextrose Oral Gel 15 Gram(s) Oral once PRN Blood Glucose LESS THAN 70 milliGRAM(s)/deciliter  ondansetron   Disintegrating Tablet 4 milliGRAM(s) Oral two times a day PRN Nausea and/or Vomiting  ondansetron Injectable 4 milliGRAM(s) IV Push every 6 hours PRN Nausea and/or Vomiting       Medications up to date at time of exam.      PHYSICAL EXAMINATION:    Vital Signs Last 24 Hrs  T(C): 36.8 (29 Aug 2023 05:30), Max: 37.6 (28 Aug 2023 21:34)  T(F): 98.2 (29 Aug 2023 05:30), Max: 99.7 (28 Aug 2023 21:34)  HR: 86 (29 Aug 2023 05:30) (86 - 97)  BP: 166/84 (29 Aug 2023 05:30) (99/45 - 166/84)  BP(mean): --  RR: 18 (29 Aug 2023 05:30) (16 - 18)  SpO2: 98% (29 Aug 2023 05:30) (96% - 100%)    Parameters below as of 29 Aug 2023 05:30  Patient On (Oxygen Delivery Method): room air       General : Alert and oriented . Able to answer question with no SOB. No acute distress .     HEENT: Head is normocephalic and atraumatic. No nasal tenderness.  Mucous membranes are moist.     NECK: Supple, no palpable adenopathy.    LUNGS: Clear to auscultation bilaterally with no wheezing, rales, or rhonchi. Non labored. No use of accessory muscle.     HEART: S1 S2 Regular rate and no click / rub.     ABDOMEN: Soft, nontender, and nondistended. Active bowel sounds.     EXTREMITIES: Left BKA using Left leg prosthesis when OOB  , using rolling walker when OOB .     NEUROLOGIC: Awake, alert, oriented.     SKIN: Warm and moist . Non diaphoretic.           LABS:                        9.9    2.28  )-----------( 90       ( 28 Aug 2023 10:34 )             32.0     08-28    127<L>  |  96  |  45<H>  ----------------------------<  211<H>  6.3<HH>   |  23  |  9.81<H>    Ca    7.9<L>      28 Aug 2023 10:34  Phos  4.6     08-28  Mg     3.1     08-28    TPro  6.5  /  Alb  2.9<L>  /  TBili  0.5  /  DBili  x   /  AST  39  /  ALT  76<H>  /  AlkPhos  107  08-28      Urinalysis Basic - ( 28 Aug 2023 10:34 )    Color: x / Appearance: x / SG: x / pH: x  Gluc: 211 mg/dL / Ketone: x  / Bili: x / Urobili: x   Blood: x / Protein: x / Nitrite: x   Leuk Esterase: x / RBC: x / WBC x   Sq Epi: x / Non Sq Epi: x / Bacteria: x                      MICROBIOLOGY: (if applicable)    RADIOLOGY & ADDITIONAL STUDIES:  EKG:   CXR:  ECHO:    IMPRESSION: 62y Male 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      Osteoarthritis      Vision loss of left eye  blind      ESRD on hemodialysis  T/Th/S      Cataract  both eyes - hx of sx done      BPH (benign prostatic hyperplasia)      UTI (urinary tract infection)  hx of      Bladder mass  hx of      H/O hematuria      Osteoporosis      Vision loss of right eye  decreased      Depression      Chronic GERD      Osteomyelitis of vertebra      CHF (congestive heart failure)      Below knee amputation status, left  2012- pt is wearing prostesis      History of right cataract extraction      History of left cataract extraction      S/P arteriovenous (AV) fistula creation  right arm brachiocephalic arteriovenous fistula on 11/08/2018      H/O hematuria  s/p bladder bx and fulguration 2/25/2020      H/O transurethral destruction of bladder lesion  2020      History of excision of mass  back mass on 03/31/2021       Impression: This is a 62 yr old man  from [facility] with ESRD on HD ( T-Th-Sat ). Current smoker . Presented to ED due to Missed Dialysis, last HD 07-22-23 . Per Patient stated that he often missed HD sessions due to Mild left leg pain and right leg swelling . S/p RRT for SOB with Hypoxia due to Acute Hypoxic Respiratory Failure secondary to Pulmonary edema/ Fluid overload due to Missed Dialysis . CT Abdomen with Small Bilateral Pleural Effusions, Rt Lower Lung with groundglass opacity. No bowel obstruction. Small Gall Stone.   Vomiting due to diabetic gastroparesis vs acute cholecystitis . Had a HAP with s/p Cefepime and Flagyl .       Suggestions:  O2 saturation 98 % room air. So far been saturating good room air.    Neph suggest premedicate with ativan before HD .   Limit fluid intake .  Continue Albuterol 90 mcg 2 puffs Q 6 Hours.   Continue Symbicort 160-4.5 mcg 2 Puffs Twice Daily .    Reinforced the importance of compliance to Dialysis schedule.

## 2023-08-29 NOTE — PROGRESS NOTE ADULT - NS ATTEND OPT1 GEN_ALL_CORE
I attest my time as attending is greater than 50% of the total combined time spent on qualifying patient care activities by the PA/NP and attending.
I independently performed the documented:
I independently performed the documented:

## 2023-08-29 NOTE — PROGRESS NOTE ADULT - PROBLEM SELECTOR PROBLEM 12
Prophylactic measure
CAD (coronary artery disease)
Prophylactic measure
Hyperphosphatemia
Prophylactic measure
Hyperphosphatemia
Prophylactic measure
Hyperphosphatemia
Prophylactic measure
Prophylactic measure

## 2023-08-29 NOTE — PROGRESS NOTE ADULT - ASSESSMENT
62 year old male from [facility], walks with RW, with PMH of ESRD on HD (TTS), BKA- L w/prosthetic leg, DM, Left eye blindness, CHF, CAD, HTN, HLD, osteoporosis, bronchitis, current smoker, and anemia who presents with complaint of missed dialysis and does not make any urine. Patient states he was having shortness of breath.  Admitted for Acute Hyperkalemia 2/2 missed dialysis found to have pulmonary edema and BNP [id number].  Hospital course complicated by hypoglycemia, and rapid response for AHRF, and diarrhea.  Hospital course further c/b pt's refusal of HD, IVs, medications and bloodwork.    Planning for early HD on 8/30, then dc to [facility] on 8/30.  For resumption of Thur HD schedule.  Discussed w/ Dr. Laboy.

## 2023-08-29 NOTE — PROGRESS NOTE ADULT - PROBLEM SELECTOR PLAN 4
H/o ESRD on HD (T/Th/Sat)  - Last HD 8/28.  Planning for early HD on 8/30, then dc to [facility].  For resumption of Thur HD schedule.  Discussed w/ Dr. Laboy.    - Nephro-Dr. Mosher

## 2023-08-30 NOTE — DISCHARGE NOTE NURSING/CASE MANAGEMENT/SOCIAL WORK - PATIENT PORTAL LINK FT
You can access the FollowMyHealth Patient Portal offered by [facility] by registering at the following website: [email or web address]. By joining [facility]’s FollowMyHealth portal, you will also be able to view your health information using other applications (apps) compatible with our system.

## 2023-08-30 NOTE — PROGRESS NOTE ADULT - REASON FOR ADMISSION
Missed dialysis

## 2023-08-30 NOTE — PROGRESS NOTE ADULT - SUBJECTIVE AND OBJECTIVE BOX
[facility] - POLA Garcia / POLA Doll / AKSHAT Tello/ POLA Maddox/ POLA Young/ ELISA [place] / JARAD Mosher / FLORA Laboy  ---------------------------------------------------------------------------------------------------------------  seen and examined today for ESRD  Interval : tolerating HD well  VITALS:  T(F): 97.5 (08-30-23 @ 09:39), Max: 97.7 (08-29-23 @ 21:05)  HR: 75 (08-30-23 @ 09:39)  BP: 132/85 (08-30-23 @ 09:39)  RR: 18 (08-30-23 @ 09:39)  SpO2: 99% (08-30-23 @ 09:39)  Wt(kg): --    Physical Exam :-  Constitutional: NAD  Neck: Supple.  Respiratory: Bilateral equal breath sounds,  Cardiovascular: S1, S2 normal,  Gastrointestinal: Bowel Sounds present, soft, non tender.  Extremities: No edema, Left BKA  Neurological: Alert and Oriented   Psychiatric: Normal mood, normal affect  Data:-  Allergies :   No Known Drug Allergies  fish (Rash)  liver (Anaphylaxis)    Hospital Medications:   MEDICATIONS  (STANDING):  albuterol    90 MICROgram(s) HFA Inhaler 2 Puff(s) Inhalation every 6 hours  amLODIPine   Tablet 10 milliGRAM(s) Oral daily  aspirin enteric coated 81 milliGRAM(s) Oral daily  atorvastatin 40 milliGRAM(s) Oral at bedtime  budesonide 160 MICROgram(s)/formoterol 4.5 MICROgram(s) Inhaler 2 Puff(s) Inhalation two times a day  chlorhexidine 2% Cloths 1 Application(s) Topical daily  dextrose 5%. 1000 milliLiter(s) (100 mL/Hr) IV Continuous <Continuous>  dextrose 5%. 1000 milliLiter(s) (50 mL/Hr) IV Continuous <Continuous>  dextrose 50% Injectable 25 Gram(s) IV Push once  dextrose 50% Injectable 12.5 Gram(s) IV Push once  dextrose 50% Injectable 25 Gram(s) IV Push once  epoetin beverley (PROCRIT) Injectable [id number] Unit(s) IV Push <User Schedule>  folic acid 1 milliGRAM(s) Oral daily  furosemide    Tablet 80 milliGRAM(s) Oral daily  glucagon  Injectable 1 milliGRAM(s) IntraMuscular once  hydrALAZINE 25 milliGRAM(s) Oral three times a day  insulin lispro (ADMELOG) corrective regimen sliding scale   SubCutaneous three times a day before meals  insulin lispro (ADMELOG) corrective regimen sliding scale   SubCutaneous at bedtime  lactobacillus acidophilus 1 Tablet(s) Oral two times a day with meals  latanoprost 0.005% Ophthalmic Solution 1 Drop(s) Both EYES at bedtime  levothyroxine 25 MICROGram(s) Oral daily  lidocaine   4% Patch 2 Patch Transdermal daily  LORazepam     Tablet 2 milliGRAM(s) Oral <User Schedule>  melatonin 3 milliGRAM(s) Oral at bedtime  metoprolol succinate ER 50 milliGRAM(s) Oral daily  pantoprazole    Tablet 40 milliGRAM(s) Oral before breakfast  sertraline 200 milliGRAM(s) Oral daily  sevelamer carbonate 800 milliGRAM(s) Oral three times a day with meals  sucralfate 1 Gram(s) Oral four times a day  vancomycin    Solution 125 milliGRAM(s) Oral every 6 hours    08-28    127<L>  |  96  |  45<H>  ----------------------------<  211<H>  6.3<HH>   |  23  |  9.81<H>    Ca    7.9<L>      28 Aug 2023 10:34  Phos  4.6     08-28  Mg     3.1     08-28    TPro  6.5  /  Alb  2.9<L>  /  TBili  0.5  /  DBili      /  AST  39  /  ALT  76<H>  /  AlkPhos  107  08-28    Creatinine Trend: 9.81 <--, 7.82 <--, 8.50 <--, 10.70 <--                        9.9    2.28  )-----------( 90       ( 28 Aug 2023 10:34 )             32.0

## 2023-08-30 NOTE — PROGRESS NOTE ADULT - ASSESSMENT
62 year old male from [facility], walks with RW, with PMH of ESRD on HD (TTS), BKA- L w/prosthetic leg, DM, Left eye blindness, CHF, CAD, HTN, HLD, osteoporosis, bronchitis, current smoker, and anemia who presents with complaint of missed dialysis.    # ESRD. on HD TIW.  continue HD MWF schedule  tolerating HD well today   metabolic acidosis from ESRD.  UF with HD   D/W pt compliance with HD   pre-HD Ativan   # anemia of CKD. epogen on HD. Transfuse for HBG <7  #CKDMBD. cont sevelamer  # HTN. blood pressure at goal  # PVD Left BKA, no active issues     D/C planning to nursing home with dialysis capabilities      For any question, call:  Cell # [phone]  Pager # [phone]  Callback # [phone]

## 2023-08-30 NOTE — PROGRESS NOTE ADULT - PROVIDER SPECIALTY LIST ADULT
Cardiology
Internal Medicine
Nephrology
Palliative Care
Pulmonology
Cardiology
Infectious Disease
Internal Medicine
Nephrology
Palliative Care
Pulmonology
Surgery
Cardiology
Infectious Disease
Infectious Disease
Internal Medicine
Nephrology
Pulmonology
Nephrology
Surgery
Internal Medicine
Internal Medicine
Pain Medicine
Internal Medicine

## 2023-08-30 NOTE — PROGRESS NOTE ADULT - SUBJECTIVE AND OBJECTIVE BOX
Patient is a 62y old  Male who presents with a chief complaint of Missed dialysis (29 Aug 2023 16:28)    PATIENT IS SEEN AND EXAMINED IN MEDICAL FLOOR.  SULTANA [    ]    JEREMY [   ]      GT [   ]    ALLERGIES:  No Known Drug Allergies  fish (Rash)  liver (Anaphylaxis)      Daily     Daily     VITALS:    Vital Signs Last 24 Hrs  T(C): 36.4 (30 Aug 2023 05:16), Max: 36.5 (29 Aug 2023 21:05)  T(F): 97.6 (30 Aug 2023 05:16), Max: 97.7 (29 Aug 2023 21:05)  HR: 73 (30 Aug 2023 05:16) (73 - 84)  BP: 167/77 (30 Aug 2023 05:16) (111/63 - 167/77)  BP(mean): --  RR: 18 (30 Aug 2023 05:16) (17 - 18)  SpO2: 98% (30 Aug 2023 05:16) (96% - 99%)    Parameters below as of 30 Aug 2023 05:16  Patient On (Oxygen Delivery Method): room air        LABS:    CBC Full  -  ( 28 Aug 2023 10:34 )  WBC Count : 2.28 K/uL  RBC Count : 3.61 M/uL  Hemoglobin : 9.9 g/dL  Hematocrit : 32.0 %  Platelet Count - Automated : 90 K/uL  Mean Cell Volume : 88.6 fl  Mean Cell Hemoglobin : 27.4 pg  Mean Cell Hemoglobin Concentration : 30.9 gm/dL  Auto Neutrophil # : 1.81 K/uL  Auto Lymphocyte # : 0.19 K/uL  Auto Monocyte # : 0.12 K/uL  Auto Eosinophil # : 0.14 K/uL  Auto Basophil # : 0.01 K/uL  Auto Neutrophil % : 79.5 %  Auto Lymphocyte % : 8.3 %  Auto Monocyte % : 5.3 %  Auto Eosinophil % : 6.1 %  Auto Basophil % : 0.4 %      08-28    127<L>  |  96  |  45<H>  ----------------------------<  211<H>  6.3<HH>   |  23  |  9.81<H>    Ca    7.9<L>      28 Aug 2023 10:34  Phos  4.6     08-28  Mg     3.1     08-28    TPro  6.5  /  Alb  2.9<L>  /  TBili  0.5  /  DBili  x   /  AST  39  /  ALT  76<H>  /  AlkPhos  107  08-28    CAPILLARY BLOOD GLUCOSE      POCT Blood Glucose.: 152 mg/dL (30 Aug 2023 08:34)  POCT Blood Glucose.: 224 mg/dL (29 Aug 2023 21:39)  POCT Blood Glucose.: 266 mg/dL (29 Aug 2023 16:49)  POCT Blood Glucose.: 251 mg/dL (29 Aug 2023 12:22)        LIVER FUNCTIONS - ( 28 Aug 2023 10:34 )  Alb: 2.9 g/dL / Pro: 6.5 g/dL / ALK PHOS: 107 U/L / ALT: 76 U/L DA / AST: 39 U/L / GGT: x           Creatinine Trend: 9.81<--, 7.82<--, 8.50<--, 10.70<--, 12.00<--, 12.30<--  I&O's Summary              MEDICATIONS:    MEDICATIONS  (STANDING):  albuterol    90 MICROgram(s) HFA Inhaler 2 Puff(s) Inhalation every 6 hours  amLODIPine   Tablet 10 milliGRAM(s) Oral daily  aspirin enteric coated 81 milliGRAM(s) Oral daily  atorvastatin 40 milliGRAM(s) Oral at bedtime  budesonide 160 MICROgram(s)/formoterol 4.5 MICROgram(s) Inhaler 2 Puff(s) Inhalation two times a day  chlorhexidine 2% Cloths 1 Application(s) Topical daily  dextrose 5%. 1000 milliLiter(s) (100 mL/Hr) IV Continuous <Continuous>  dextrose 5%. 1000 milliLiter(s) (50 mL/Hr) IV Continuous <Continuous>  dextrose 50% Injectable 25 Gram(s) IV Push once  dextrose 50% Injectable 12.5 Gram(s) IV Push once  dextrose 50% Injectable 25 Gram(s) IV Push once  epoetin beverley (PROCRIT) Injectable 39563 Unit(s) IV Push <User Schedule>  folic acid 1 milliGRAM(s) Oral daily  furosemide    Tablet 80 milliGRAM(s) Oral daily  glucagon  Injectable 1 milliGRAM(s) IntraMuscular once  hydrALAZINE 25 milliGRAM(s) Oral three times a day  insulin lispro (ADMELOG) corrective regimen sliding scale   SubCutaneous three times a day before meals  insulin lispro (ADMELOG) corrective regimen sliding scale   SubCutaneous at bedtime  lactobacillus acidophilus 1 Tablet(s) Oral two times a day with meals  latanoprost 0.005% Ophthalmic Solution 1 Drop(s) Both EYES at bedtime  levothyroxine 25 MICROGram(s) Oral daily  lidocaine   4% Patch 2 Patch Transdermal daily  LORazepam     Tablet 2 milliGRAM(s) Oral <User Schedule>  melatonin 3 milliGRAM(s) Oral at bedtime  metoprolol succinate ER 50 milliGRAM(s) Oral daily  pantoprazole    Tablet 40 milliGRAM(s) Oral before breakfast  sertraline 200 milliGRAM(s) Oral daily  sevelamer carbonate 800 milliGRAM(s) Oral three times a day with meals  sucralfate 1 Gram(s) Oral four times a day  vancomycin    Solution 125 milliGRAM(s) Oral every 6 hours      MEDICATIONS  (PRN):  acetaminophen     Tablet .. 1000 milliGRAM(s) Oral every 8 hours PRN Moderate Pain (4 - 6)  dextrose Oral Gel 15 Gram(s) Oral once PRN Blood Glucose LESS THAN 70 milliGRAM(s)/deciliter  ondansetron   Disintegrating Tablet 4 milliGRAM(s) Oral two times a day PRN Nausea and/or Vomiting  ondansetron Injectable 4 milliGRAM(s) IV Push every 6 hours PRN Nausea and/or Vomiting      REVIEW OF SYSTEMS:                           ALL ROS DONE [ X   ]    CONSTITUTIONAL:  LETHARGIC [   ], FEVER [   ], UNRESPONSIVE [   ]  CVS:  CP  [   ], SOB, [   ], PALPITATIONS [   ], DIZZYNESS [   ]  RS: COUGH [   ], SPUTUM [   ]  GI: ABDOMINAL PAIN [   ], NAUSEA [   ], VOMITINGS [   ], DIARRHEA [   ], CONSTIPATION [   ]  :  DYSURIA [   ], NOCTURIA [   ], INCREASED FREQUENCY [   ], DRIBLING [   ],  SKELETAL: PAINFUL JOINTS [   ], SWOLLEN JOINTS [   ], NECK ACHE [   ], LOW BACK ACHE [   ],  SKIN : ULCERS [   ], RASH [   ], ITCHING [   ]  CNS: HEAD ACHE [   ], DOUBLE VISION [   ], BLURRED VISION [   ], AMS / CONFUSION [   ], SEIZURES [   ], WEAKNESS [   ],TINGLING / NUMBNESS [   ]      PHYSICAL EXAM:    GENERAL APPEARANCE: NO DISTRESS  HEENT:  NO PALLOR, NO  JVD,  NO   NODES, NECK SUPPLE  CVS: S1 +, S2 +,   RS: AEEB,  OCCASIONAL  RALES +,   CRACKLES+ AT LUNG BASES [IMPROVED]  ABD: SOFT, NT, NO, BS +  EXT: LEFT BKA  SKIN: WARM,   SKELETAL:  ROM ACCEPTABLE  CNS:  AAO X  2-3        RADIOLOGY :    RADIOLOGY AND READINGS REVIEWED      ASSESSMENT :     Hypervolemia    Hypertension    Adrenal insufficiency    CKD (chronic kidney disease)    Anemia    Glaucoma    Coronary artery disease    HLD (hyperlipidemia)    Peripheral vascular disease    Spinal stenosis of lumbosacral region    Hyperparathyroidism    Diabetes mellitus    Diabetic neuropathy    Contracture of hand    Osteoarthritis    Osteoporosis    Vision loss of left eye    ESRD on hemodialysis    Cataract    BPH (benign prostatic hyperplasia)    UTI (urinary tract infection)    Bladder mass    H/O hematuria    Osteoporosis    Vision loss of right eye    Depression    Chronic GERD    Osteomyelitis of vertebra    CHF (congestive heart failure)    Below knee amputation status, left    History of right cataract extraction    History of left cataract extraction    S/P arteriovenous (AV) fistula creation    H/O hematuria    H/O transurethral destruction of bladder lesion    History of excision of mass          PLAN:    HPI:  Patient is 62 year old male from [facility], walks with RW, with PMH of ESRD on HD (TTS), BKA- L w/prosthetic leg, DM, Left eye blindness, CHF, CAD, HTN, HLD, osteoporosis, bronchitis, current smoker, and anemia who presents with complaint of missed dialysis. Last HD 7/22. Pt states he often missed HD sessions.  patient states he missed this HD session due to mild pain in left leg, patient remains able to ambulate. Pt complains of right leg swelling and states he does not make any urine. Patient states he was having mild shortness of breath.  Pt denies any fever, chills, N/V/D, constipation, CP, numbness or tingling (26 Jul 2023 19:20)      # DC PLAN IS BEING D/W PATIENT, NEPHROLOGY TEAM  AND SW       # [8/9] MEETING HELD WITH PATIENT, BROTHER ARTEMIO @ [phone] AND SW TEAM. PATIENT W/ HISTORY OF ROUTINE NONCOMPLIANCE W/ LIFE-SUSTAINING TREATMENT [HD], EVALUATIONS AND INTERVENTIONS - COUNSELLED AT LENGTH TO REMAIN COMPLIANT. DISCUSSED THAT PROGNOSIS IS POOR/GRIM GIVEN UNDERLYING MEDICAL CONDITIONS AND GIVEN NONCOMPLIANCE. HE VERBALIZED UNDERSTANDING. REGARDING GOC - PATIENT WISHES FOR FULL CODE. PALLIATIVE CARE CONSULTED. PSYCHIATRY CONSULTED. PATIENT EXPRESSED THAT HE DOES GROW IMPATIENT DURING DIALYSIS SESSIONS AND HAS BEEN LEAVING SESSIONS SOONER THAN PRESCRIBED. IN DISCUSSION THAT RISKS OF DEFERRING COMPLETE HD - INCLUDE BUT ARE NOT LIMITED TO WORSENING CLINICAL CONDITION, ELECTROLYTE ABNORMALITIES, ARRHYTHMIA AND DEATH. HE VERBALIZED UNDERSTANDING. D/W PATIENT THAT REPEATEDLY REFUSING INTERVENTIONS AND ASSESSMENTS IS NOT IN LINE WITH HIS WISHES TO IMPROVE. PATIENT VERBALIZED UNDERSTANDING. DISCUSSED REGARDING CURRENT CLINICAL CONDITION, RECOMMENDED EVALUATIONS AND INTERVENTIONS. ALL QUESTIONS ANSWERED. TEAM HAS OFFERED PATIENT EMOTIONAL SUPPORT AND OFFERED MEDICATION FOR MOOD. OFFERED TO PRE-MEDICATE W/ ANXIOLYTIC PRIOR TO HD. PATIENT IS AGREEABLE.     DISCUSSED THAT PATIENT WILL NEED TO COMPLY WITH HD SESSIONS IN ORDER TO BE MEDICALLY OPTIMIZED FOR DISCHARGE AND FOR SAFE D/C PLANNING. TEAM DISCUSSED OPTIONS OF RETURNING TO [facility] W/ OUTPATIENT HD , IF CARE NEEDS CAN BE SAFELY MET VS. NURSING HOME/[facility] . PATIENT AND BROTHER VERBALIZED UNDERSTANDING.     - SW AND MEDICAL TEAM HAVING ONGOING DISCUSSION WITH PATIENT REGARDING CONSISTENT COMPLIANCE IN ORDER TO RETURN TO HD IN THE OUTPATIENT SETTING  -- CONVEYED THAT PATIENT AND TEAM THAT PATIENT WILL NEED TO CONSISTENTLY COMPLETE FULL HD SESSIONS IN ORDER TO BE CONSIDERED BY OUTPATIENT HD AT NH W/ ONSITE HD OR OUTSIDE HD FACILITY. PATIENT HAS COMPLETED 3 CONSECUTIVE SESSIONS WITH 3 HR OF HD FOR EACH SESSION.     # [8/29] - MEETING PATIENT'S BROTHER ARTEMIO @ [phone] AND SW TEAM - CONVEYED THAT PATIENT WISHES TO RETURN TO [facility] W/ OUTPATIENT HD. DISCUSSED WITH BROTHER THAT TEAM RECOMMENDS PATIENT CONSIDER LONG TERM PLACEMENT W/ HD - HOWEVER DESPITE COUNSELLING PATIENT WISHES TO RETURN TO [facility]. BROTHER VERBALIZED UNDERSTANDING AND EXPRESSED THAT AT THIS TIME, GIVEN PATIENT'S WISHES HE IS AGREEABLE WITH PLAN FOR PATIENT TO RETURN TO ASSISTED LIVING. HE ALSO VERBALIZED UNDERSTANDING OF THE RISKS OF DEFERRING LONG TERM PLACEMENT IN NH GIVEN PATIENT'S CARE NEEDS.       # DIARRHEA - IMPROVED  # R/O COLITIS    - PATIENT REFUSING IV LINE PLACEMENT AND BLOOD WORK; RISKS OF DEFERRING DISCUSSED, PATIENT VERBALIZED UNDERSTANDING  - PLACED ON PO VANCOMYCIN X 10 DAYS  - ABD XRAY and CXRAY - W/OUT ACUTE FINDINGS  - ID CONSULT    # ACUTE ON CHRONIC HYPOXIC RESPIRATORY FAILURE S/T PULMONARY EDEMA - S/T INCOMPLETE HD SESSIONS ; ANASARCA - IMPROVED  # HYPERKALEMIA  # HX OF COPD + S/P RECENT MULTIFOCAL PNEUMONIA ; R/O ASPIRATION PNEUMONIA  # PULMONARY HYPERTENSION  # UNCONTROLLED HTN  # ESRD ON HD TTS - W/ HX OF NONCOMPLIANCE    - TELEMETRY  - HYDRALAZINE, METOPROLOL AND NORVASC ; PRN IV ANTIHYPERTENSIVE  - LOKELMA  - SUPPLEMENTAL O2  - PLANNED FOR HD  - NEPHROLOGY CONSULT  - PULMONOLOGY CONSULT  - ID CONSULT    - CONTINUES TO REFUSE OR PRE-MATURELY DISCONTINUE SESSIONS OF HD       - [7/30] - OVERNIGHT RAPID RESPONSE S/T HYPOXIA - SELF-LIMITED - PATIENT IMPROVED S/P O2 SUPPLEMENT  - S/P CEFEPIME + FLAGYLL, BCX - NGTD      # RECURRENT INTRACTABLE NAUSEA/VOMITING, ? COFFEE GROUND EMESIS  # GASTROPARESIS  # HISTORY OF ESOPHAGITIS AND DUODENITIS [1/2021], HX OF GASTROPARESIS W/ EVIDENCE OF THICKENED ESOPHAGUS ON PREVIOUS CT SCAN   # PANCREAS W/ DOUBLE DUCT SIGN    - MONITORING HGB, PLACED ON PPI BID , CARAFATE QID  - PRN ANTIEMETICS  ; S/P DOSE OF REGLAN [WITH MINIMAL IMPROVEMENT]  - MONITORING FOR SYMPTOMS  - WHILE ON DIET PATIENT REPEATEDLY COUNSELLED TO CHEW FOOD CAUTIOUSLY AND CONSUME SMALL BITES W/ REGULAR CLEARING WITH WATER BETWEEN BITES    - ADVANCE DIET AS TOLERATED  - NOTED CT A/P    - S/P RECENT PRBC TRANSFUSION     - GI CONSULT  - SURGERY CONSULT    - [8/14] - EPISODE OF NAUSEA/VOMITING OVER THE WEEKEND - IMPROVED    # LESS LIKELY CHOLECYSTITIS  - S/P ABX  - NOTED CT A/P  - HIDA SCAN - NEGATIVE  - SURGERY CONSULT    # ELEVATED TROPONINS - ? DEMAND ISCHEMIA    - S/P TELEMETRY  - TREND TROPONINS  - ECHO - TRACE MR, MODERATELY INCREASED LV WALL THICKNESS, NORMAL LV SYSTOLIC FUNCTION, SEVERE PULMONARY HTN  - CARDIOLOGY CONSULT    # EPISODE OF HYPOGLYCEMIA - IMPROVED  # GASTROPARESIS  # UNDERLYING DM  - SSI + FS  - COUNSELLED TO COMPLY WITH HD TO REDUCE UREMIA    - REPEATEDLY COUNSELLED TO COMPLY WITH FINGERSTICKS      # DEPRESSION  - PLACED ON ZOLOFT  - PSYCHIATRY CONSULT    # PATIENT UNDERGOING OUTPATIENT WORKUP FOR CENTRAL VENOUS STENOSIS THROUGH NEPHROLOGY TEAM  - ? s/p STENT PLACEMENT    # ANEMIA OF CKD  # HX OF PANCYTOPENIA   - TREND HGB, TRANSFUSION THRESHOLD HGB < 7; PATIENT STILL INTERMITTENTLY REFUSING BLOODWORK, COUNSELLED TO COMPLY  - TYPE AND SCREEN  - ON EPO  - DENIES RECENT HEMATEMESIS, MELENA, HEMATOCHEZIA    - S/P RECENT PRBC TRANSFUSION  - S/P PRBC TRANSFUSION 7/29    - PPI BID, CARAFATE QID    # SEVERE PROTEIN CALORIE MALNUTRITION, FAILURE TO THRIVE   -  TEMPORAL WASTING, LOSS OF MUSCLE MASS FROM SHOULDER AND HIP GIRDLE  - NUTRITIONAL SUPPLEMENT    # HLD  # PARTIALLY BLIND  # S/P LEFT BKA  # HX OF PVD  # LS SPINAL STENOSIS  # GI AND DVT PPX   Patient is a 62y old  Male who presents with a chief complaint of Missed dialysis (29 Aug 2023 16:28)    PATIENT IS SEEN AND EXAMINED IN MEDICAL FLOOR.      ALLERGIES:  No Known Drug Allergies  fish (Rash)  liver (Anaphylaxis)        VITALS:    Vital Signs Last 24 Hrs  T(C): 36.4 (30 Aug 2023 05:16), Max: 36.5 (29 Aug 2023 21:05)  T(F): 97.6 (30 Aug 2023 05:16), Max: 97.7 (29 Aug 2023 21:05)  HR: 73 (30 Aug 2023 05:16) (73 - 84)  BP: 167/77 (30 Aug 2023 05:16) (111/63 - 167/77)  BP(mean): --  RR: 18 (30 Aug 2023 05:16) (17 - 18)  SpO2: 98% (30 Aug 2023 05:16) (96% - 99%)    Parameters below as of 30 Aug 2023 05:16  Patient On (Oxygen Delivery Method): room air        LABS:    CBC Full  -  ( 28 Aug 2023 10:34 )  WBC Count : 2.28 K/uL  RBC Count : 3.61 M/uL  Hemoglobin : 9.9 g/dL  Hematocrit : 32.0 %  Platelet Count - Automated : 90 K/uL  Mean Cell Volume : 88.6 fl  Mean Cell Hemoglobin : 27.4 pg  Mean Cell Hemoglobin Concentration : 30.9 gm/dL  Auto Neutrophil # : 1.81 K/uL  Auto Lymphocyte # : 0.19 K/uL  Auto Monocyte # : 0.12 K/uL  Auto Eosinophil # : 0.14 K/uL  Auto Basophil # : 0.01 K/uL  Auto Neutrophil % : 79.5 %  Auto Lymphocyte % : 8.3 %  Auto Monocyte % : 5.3 %  Auto Eosinophil % : 6.1 %  Auto Basophil % : 0.4 %      08-28    127<L>  |  96  |  45<H>  ----------------------------<  211<H>  6.3<HH>   |  23  |  9.81<H>    Ca    7.9<L>      28 Aug 2023 10:34  Phos  4.6     08-28  Mg     3.1     08-28    TPro  6.5  /  Alb  2.9<L>  /  TBili  0.5  /  DBili  x   /  AST  39  /  ALT  76<H>  /  AlkPhos  107  08-28    CAPILLARY BLOOD GLUCOSE      POCT Blood Glucose.: 152 mg/dL (30 Aug 2023 08:34)  POCT Blood Glucose.: 224 mg/dL (29 Aug 2023 21:39)  POCT Blood Glucose.: 266 mg/dL (29 Aug 2023 16:49)  POCT Blood Glucose.: 251 mg/dL (29 Aug 2023 12:22)        LIVER FUNCTIONS - ( 28 Aug 2023 10:34 )  Alb: 2.9 g/dL / Pro: 6.5 g/dL / ALK PHOS: 107 U/L / ALT: 76 U/L DA / AST: 39 U/L / GGT: x           Creatinine Trend: 9.81<--, 7.82<--, 8.50<--, 10.70<--, 12.00<--, 12.30<--  I&O's Summary              MEDICATIONS:    MEDICATIONS  (STANDING):  albuterol    90 MICROgram(s) HFA Inhaler 2 Puff(s) Inhalation every 6 hours  amLODIPine   Tablet 10 milliGRAM(s) Oral daily  aspirin enteric coated 81 milliGRAM(s) Oral daily  atorvastatin 40 milliGRAM(s) Oral at bedtime  budesonide 160 MICROgram(s)/formoterol 4.5 MICROgram(s) Inhaler 2 Puff(s) Inhalation two times a day  chlorhexidine 2% Cloths 1 Application(s) Topical daily  dextrose 5%. 1000 milliLiter(s) (100 mL/Hr) IV Continuous <Continuous>  dextrose 5%. 1000 milliLiter(s) (50 mL/Hr) IV Continuous <Continuous>  dextrose 50% Injectable 25 Gram(s) IV Push once  dextrose 50% Injectable 12.5 Gram(s) IV Push once  dextrose 50% Injectable 25 Gram(s) IV Push once  epoetin beverley (PROCRIT) Injectable 93324 Unit(s) IV Push <User Schedule>  folic acid 1 milliGRAM(s) Oral daily  furosemide    Tablet 80 milliGRAM(s) Oral daily  glucagon  Injectable 1 milliGRAM(s) IntraMuscular once  hydrALAZINE 25 milliGRAM(s) Oral three times a day  insulin lispro (ADMELOG) corrective regimen sliding scale   SubCutaneous three times a day before meals  insulin lispro (ADMELOG) corrective regimen sliding scale   SubCutaneous at bedtime  lactobacillus acidophilus 1 Tablet(s) Oral two times a day with meals  latanoprost 0.005% Ophthalmic Solution 1 Drop(s) Both EYES at bedtime  levothyroxine 25 MICROGram(s) Oral daily  lidocaine   4% Patch 2 Patch Transdermal daily  LORazepam     Tablet 2 milliGRAM(s) Oral <User Schedule>  melatonin 3 milliGRAM(s) Oral at bedtime  metoprolol succinate ER 50 milliGRAM(s) Oral daily  pantoprazole    Tablet 40 milliGRAM(s) Oral before breakfast  sertraline 200 milliGRAM(s) Oral daily  sevelamer carbonate 800 milliGRAM(s) Oral three times a day with meals  sucralfate 1 Gram(s) Oral four times a day  vancomycin    Solution 125 milliGRAM(s) Oral every 6 hours      MEDICATIONS  (PRN):  acetaminophen     Tablet .. 1000 milliGRAM(s) Oral every 8 hours PRN Moderate Pain (4 - 6)  dextrose Oral Gel 15 Gram(s) Oral once PRN Blood Glucose LESS THAN 70 milliGRAM(s)/deciliter  ondansetron   Disintegrating Tablet 4 milliGRAM(s) Oral two times a day PRN Nausea and/or Vomiting  ondansetron Injectable 4 milliGRAM(s) IV Push every 6 hours PRN Nausea and/or Vomiting      REVIEW OF SYSTEMS:                           ALL ROS DONE [ X   ]    CONSTITUTIONAL:  LETHARGIC [   ], FEVER [   ], UNRESPONSIVE [   ]  CVS:  CP  [   ], SOB, [   ], PALPITATIONS [   ], DIZZYNESS [   ]  RS: COUGH [   ], SPUTUM [   ]  GI: ABDOMINAL PAIN [   ], NAUSEA [   ], VOMITINGS [   ], DIARRHEA [   ], CONSTIPATION [   ]  :  DYSURIA [   ], NOCTURIA [   ], INCREASED FREQUENCY [   ], DRIBLING [   ],  SKELETAL: PAINFUL JOINTS [   ], SWOLLEN JOINTS [   ], NECK ACHE [   ], LOW BACK ACHE [   ],  SKIN : ULCERS [   ], RASH [   ], ITCHING [   ]  CNS: HEAD ACHE [   ], DOUBLE VISION [   ], BLURRED VISION [   ], AMS / CONFUSION [   ], SEIZURES [   ], WEAKNESS [   ],TINGLING / NUMBNESS [   ]      PHYSICAL EXAM:    GENERAL APPEARANCE: NO DISTRESS  HEENT:  NO PALLOR, NO  JVD,  NO   NODES, NECK SUPPLE  CVS: S1 +, S2 +,   RS: AEEB,  OCCASIONAL  RALES +,   CRACKLES+ AT LUNG BASES [IMPROVED]  ABD: SOFT, NT, NO, BS +  EXT: LEFT BKA  SKIN: WARM,   SKELETAL:  ROM ACCEPTABLE  CNS:  AAO X  2-3        RADIOLOGY :    RADIOLOGY AND READINGS REVIEWED      ASSESSMENT :     Hypervolemia    Hypertension    Adrenal insufficiency    CKD (chronic kidney disease)    Anemia    Glaucoma    Coronary artery disease    HLD (hyperlipidemia)    Peripheral vascular disease    Spinal stenosis of lumbosacral region    Hyperparathyroidism    Diabetes mellitus    Diabetic neuropathy    Contracture of hand    Osteoarthritis    Osteoporosis    Vision loss of left eye    ESRD on hemodialysis    Cataract    BPH (benign prostatic hyperplasia)    UTI (urinary tract infection)    Bladder mass    H/O hematuria    Osteoporosis    Vision loss of right eye    Depression    Chronic GERD    Osteomyelitis of vertebra    CHF (congestive heart failure)    Below knee amputation status, left    History of right cataract extraction    History of left cataract extraction    S/P arteriovenous (AV) fistula creation    H/O hematuria    H/O transurethral destruction of bladder lesion    History of excision of mass          PLAN:    HPI:  Patient is 62 year old male from [facility], walks with , with PMH of ESRD on HD (TTS), BKA- L w/prosthetic leg, DM, Left eye blindness, CHF, CAD, HTN, HLD, osteoporosis, bronchitis, current smoker, and anemia who presents with complaint of missed dialysis. Last HD 7/22. Pt states he often missed HD sessions.  patient states he missed this HD session due to mild pain in left leg, patient remains able to ambulate. Pt complains of right leg swelling and states he does not make any urine. Patient states he was having mild shortness of breath.  Pt denies any fever, chills, N/V/D, constipation, CP, numbness or tingling (26 Jul 2023 19:20)      # DC PLAN IS BEING D/W PATIENT, NEPHROLOGY TEAM  AND SW       # SW AND MEDICAL TEAM HAVING ONGOING DISCUSSION WITH PATIENT REGARDING CONSISTENT COMPLIANCE IN ORDER TO RETURN TO HD IN THE OUTPATIENT SETTING  -- CONVEYED THAT PATIENT AND TEAM THAT PATIENT WILL NEED TO CONSISTENTLY COMPLETE FULL HD SESSIONS IN ORDER TO BE CONSIDERED BY OUTPATIENT HD AT NH W/ ONSITE HD OR OUTSIDE HD FACILITY. PATIENT HAS COMPLETED 3 CONSECUTIVE SESSIONS WITH 3 HR OF HD FOR EACH SESSION.     [8/30] AFTER DISCUSSION, WITH PATIENT - HE IS AGREEABLE TO GOING TO [facility]. CM/SW TEAM COORDINATING       # DIARRHEA - IMPROVED  # R/O COLITIS    - PATIENT REFUSING IV LINE PLACEMENT AND BLOOD WORK; RISKS OF DEFERRING DISCUSSED, PATIENT VERBALIZED UNDERSTANDING  - PLACED ON PO VANCOMYCIN X 10 DAYS  - ABD XRAY and CXRAY - W/OUT ACUTE FINDINGS  - ID CONSULT    # ACUTE ON CHRONIC HYPOXIC RESPIRATORY FAILURE S/T PULMONARY EDEMA - S/T INCOMPLETE HD SESSIONS ; ANASARCA - IMPROVED  # HYPERKALEMIA  # HX OF COPD + S/P RECENT MULTIFOCAL PNEUMONIA ; R/O ASPIRATION PNEUMONIA  # PULMONARY HYPERTENSION  # UNCONTROLLED HTN  # ESRD ON HD TTS - W/ HX OF NONCOMPLIANCE    - TELEMETRY  - HYDRALAZINE, METOPROLOL AND NORVASC ; PRN IV ANTIHYPERTENSIVE  - LOKELMA  - SUPPLEMENTAL O2  - PLANNED FOR HD  - NEPHROLOGY CONSULT  - PULMONOLOGY CONSULT  - ID CONSULT    - CONTINUES TO REFUSE OR PRE-MATURELY DISCONTINUE SESSIONS OF HD       - [7/30] - OVERNIGHT RAPID RESPONSE S/T HYPOXIA - SELF-LIMITED - PATIENT IMPROVED S/P O2 SUPPLEMENT  - S/P CEFEPIME + FLAGYLL, BCX - NGTD      # RECURRENT INTRACTABLE NAUSEA/VOMITING, ? COFFEE GROUND EMESIS  # GASTROPARESIS  # HISTORY OF ESOPHAGITIS AND DUODENITIS [1/2021], HX OF GASTROPARESIS W/ EVIDENCE OF THICKENED ESOPHAGUS ON PREVIOUS CT SCAN   # PANCREAS W/ DOUBLE DUCT SIGN    - MONITORING HGB, PLACED ON PPI BID , CARAFATE QID  - PRN ANTIEMETICS  ; S/P DOSE OF REGLAN [WITH MINIMAL IMPROVEMENT]  - MONITORING FOR SYMPTOMS  - WHILE ON DIET PATIENT REPEATEDLY COUNSELLED TO CHEW FOOD CAUTIOUSLY AND CONSUME SMALL BITES W/ REGULAR CLEARING WITH WATER BETWEEN BITES    - ADVANCE DIET AS TOLERATED  - NOTED CT A/P    - S/P RECENT PRBC TRANSFUSION     - GI CONSULT  - SURGERY CONSULT    - [8/14] - EPISODE OF NAUSEA/VOMITING OVER THE WEEKEND - IMPROVED    # LESS LIKELY CHOLECYSTITIS  - S/P ABX  - NOTED CT A/P  - HIDA SCAN - NEGATIVE  - SURGERY CONSULT    # ELEVATED TROPONINS - ? DEMAND ISCHEMIA    - S/P TELEMETRY  - TREND TROPONINS  - ECHO - TRACE MR, MODERATELY INCREASED LV WALL THICKNESS, NORMAL LV SYSTOLIC FUNCTION, SEVERE PULMONARY HTN  - CARDIOLOGY CONSULT    # EPISODE OF HYPOGLYCEMIA - IMPROVED  # GASTROPARESIS  # UNDERLYING DM  - SSI + FS  - COUNSELLED TO COMPLY WITH HD TO REDUCE UREMIA    - REPEATEDLY COUNSELLED TO COMPLY WITH FINGERSTICKS      # DEPRESSION  - PLACED ON ZOLOFT  - PSYCHIATRY CONSULT    # PATIENT UNDERGOING OUTPATIENT WORKUP FOR CENTRAL VENOUS STENOSIS THROUGH NEPHROLOGY TEAM  - ? s/p STENT PLACEMENT    # ANEMIA OF CKD  # HX OF PANCYTOPENIA   - TREND HGB, TRANSFUSION THRESHOLD HGB < 7; PATIENT STILL INTERMITTENTLY REFUSING BLOODWORK, COUNSELLED TO COMPLY  - TYPE AND SCREEN  - ON EPO  - DENIES RECENT HEMATEMESIS, MELENA, HEMATOCHEZIA    - S/P RECENT PRBC TRANSFUSION  - S/P PRBC TRANSFUSION 7/29    - PPI BID, CARAFATE QID    # SEVERE PROTEIN CALORIE MALNUTRITION, FAILURE TO THRIVE   -  TEMPORAL WASTING, LOSS OF MUSCLE MASS FROM SHOULDER AND HIP GIRDLE  - NUTRITIONAL SUPPLEMENT    # HLD  # PARTIALLY BLIND  # S/P LEFT BKA  # HX OF PVD  # LS SPINAL STENOSIS  # GI AND DVT PPX

## 2023-08-30 NOTE — DISCHARGE NOTE NURSING/CASE MANAGEMENT/SOCIAL WORK - NSDCPEFALRISK_GEN_ALL_CORE
For information on Fall & Injury Prevention, visit: [email or web address] OR  [email or web address] OR  https://www.cdc.gov/steadi/patient.html

## 2023-08-30 NOTE — PROGRESS NOTE ADULT - SUBJECTIVE AND OBJECTIVE BOX
Time of Visit:  Patient seen and examined.     MEDICATIONS  (STANDING):  albuterol    90 MICROgram(s) HFA Inhaler 2 Puff(s) Inhalation every 6 hours  amLODIPine   Tablet 10 milliGRAM(s) Oral daily  aspirin enteric coated 81 milliGRAM(s) Oral daily  atorvastatin 40 milliGRAM(s) Oral at bedtime  budesonide 160 MICROgram(s)/formoterol 4.5 MICROgram(s) Inhaler 2 Puff(s) Inhalation two times a day  chlorhexidine 2% Cloths 1 Application(s) Topical daily  dextrose 5%. 1000 milliLiter(s) (100 mL/Hr) IV Continuous <Continuous>  dextrose 5%. 1000 milliLiter(s) (50 mL/Hr) IV Continuous <Continuous>  dextrose 50% Injectable 25 Gram(s) IV Push once  dextrose 50% Injectable 12.5 Gram(s) IV Push once  dextrose 50% Injectable 25 Gram(s) IV Push once  epoetin beverley (PROCRIT) Injectable 99246 Unit(s) IV Push <User Schedule>  folic acid 1 milliGRAM(s) Oral daily  furosemide    Tablet 80 milliGRAM(s) Oral daily  glucagon  Injectable 1 milliGRAM(s) IntraMuscular once  hydrALAZINE 25 milliGRAM(s) Oral three times a day  insulin lispro (ADMELOG) corrective regimen sliding scale   SubCutaneous three times a day before meals  insulin lispro (ADMELOG) corrective regimen sliding scale   SubCutaneous at bedtime  lactobacillus acidophilus 1 Tablet(s) Oral two times a day with meals  latanoprost 0.005% Ophthalmic Solution 1 Drop(s) Both EYES at bedtime  levothyroxine 25 MICROGram(s) Oral daily  lidocaine   4% Patch 2 Patch Transdermal daily  LORazepam     Tablet 2 milliGRAM(s) Oral <User Schedule>  melatonin 3 milliGRAM(s) Oral at bedtime  metoprolol succinate ER 50 milliGRAM(s) Oral daily  pantoprazole    Tablet 40 milliGRAM(s) Oral before breakfast  sertraline 200 milliGRAM(s) Oral daily  sevelamer carbonate 800 milliGRAM(s) Oral three times a day with meals  sucralfate 1 Gram(s) Oral four times a day  vancomycin    Solution 125 milliGRAM(s) Oral every 6 hours      MEDICATIONS  (PRN):  acetaminophen     Tablet .. 1000 milliGRAM(s) Oral every 8 hours PRN Moderate Pain (4 - 6)  dextrose Oral Gel 15 Gram(s) Oral once PRN Blood Glucose LESS THAN 70 milliGRAM(s)/deciliter  ondansetron   Disintegrating Tablet 4 milliGRAM(s) Oral two times a day PRN Nausea and/or Vomiting  ondansetron Injectable 4 milliGRAM(s) IV Push every 6 hours PRN Nausea and/or Vomiting       Medications up to date at time of exam.      PHYSICAL EXAMINATION:  Patient has no new complaints.  GENERAL: The patient is a well-developed, well-nourished, in no apparent distress.     Vital Signs Last 24 Hrs  T(C): 36.4 (30 Aug 2023 13:53), Max: 36.5 (29 Aug 2023 21:05)  T(F): 97.6 (30 Aug 2023 13:53), Max: 97.7 (29 Aug 2023 21:05)  HR: 95 (30 Aug 2023 13:53) (73 - 95)  BP: 103/59 (30 Aug 2023 13:53) (92/59 - 167/77)  BP(mean): --  RR: 18 (30 Aug 2023 13:53) (17 - 18)  SpO2: 100% (30 Aug 2023 13:53) (96% - 100%)    Parameters below as of 30 Aug 2023 13:53  Patient On (Oxygen Delivery Method): room air       (if applicable)    Chest Tube (if applicable)    HEENT: Head is normocephalic and atraumatic. Extraocular muscles are intact. Mucous membranes are moist.     NECK: Supple, no palpable adenopathy.    LUNGS: Clear to auscultation, no wheezing, rales, or rhonchi.    HEART: Regular rate and rhythm without murmur.    ABDOMEN: Soft, nontender, and nondistended.  No hepatosplenomegaly is noted.    : No painful voiding, no pelvic pain    EXTREMITIES: Without any cyanosis, clubbing, rash, lesions or edema.    NEUROLOGIC: Awake, alert, oriented, grossly intact    SKIN: Warm, dry, good turgor.      LABS:                        10.5   1.94  )-----------( 85       ( 30 Aug 2023 09:20 )             34.1     08-30    126<L>  |  95<L>  |  40<H>  ----------------------------<  191<H>  6.0<H>   |  23  |  8.43<H>    Ca    7.9<L>      30 Aug 2023 09:20  Phos  4.5     08-30  Mg     2.9     08-30    TPro  6.3  /  Alb  2.9<L>  /  TBili  0.5  /  DBili  x   /  AST  44<H>  /  ALT  91<H>  /  AlkPhos  105  08-30      Urinalysis Basic - ( 30 Aug 2023 09:20 )    Color: x / Appearance: x / SG: x / pH: x  Gluc: 191 mg/dL / Ketone: x  / Bili: x / Urobili: x   Blood: x / Protein: x / Nitrite: x   Leuk Esterase: x / RBC: x / WBC x   Sq Epi: x / Non Sq Epi: x / Bacteria: x                      MICROBIOLOGY: (if applicable)    RADIOLOGY & ADDITIONAL STUDIES:  EKG:   CXR:  ECHO:    IMPRESSION: 62y Male 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      Osteoarthritis      Vision loss of left eye  blind      ESRD on hemodialysis  T/Th/S      Cataract  both eyes - hx of sx done      BPH (benign prostatic hyperplasia)      UTI (urinary tract infection)  hx of      Bladder mass  hx of      H/O hematuria      Osteoporosis      Vision loss of right eye  decreased      Depression      Chronic GERD      Osteomyelitis of vertebra      CHF (congestive heart failure)      Below knee amputation status, left  2012- pt is wearing prostesis      History of right cataract extraction      History of left cataract extraction      S/P arteriovenous (AV) fistula creation  right arm brachiocephalic arteriovenous fistula on 11/08/2018      H/O hematuria  s/p bladder bx and fulguration 2/25/2020      H/O transurethral destruction of bladder lesion  2020      History of excision of mass  back mass on 03/31/2021       p/w           RECOMMENDATIONS:   Time of Visit:  Patient seen and examined.     MEDICATIONS  (STANDING):  albuterol    90 MICROgram(s) HFA Inhaler 2 Puff(s) Inhalation every 6 hours  amLODIPine   Tablet 10 milliGRAM(s) Oral daily  aspirin enteric coated 81 milliGRAM(s) Oral daily  atorvastatin 40 milliGRAM(s) Oral at bedtime  budesonide 160 MICROgram(s)/formoterol 4.5 MICROgram(s) Inhaler 2 Puff(s) Inhalation two times a day  chlorhexidine 2% Cloths 1 Application(s) Topical daily  dextrose 5%. 1000 milliLiter(s) (100 mL/Hr) IV Continuous <Continuous>  dextrose 5%. 1000 milliLiter(s) (50 mL/Hr) IV Continuous <Continuous>  dextrose 50% Injectable 25 Gram(s) IV Push once  dextrose 50% Injectable 12.5 Gram(s) IV Push once  dextrose 50% Injectable 25 Gram(s) IV Push once  epoetin beverley (PROCRIT) Injectable 38013 Unit(s) IV Push <User Schedule>  folic acid 1 milliGRAM(s) Oral daily  furosemide    Tablet 80 milliGRAM(s) Oral daily  glucagon  Injectable 1 milliGRAM(s) IntraMuscular once  hydrALAZINE 25 milliGRAM(s) Oral three times a day  insulin lispro (ADMELOG) corrective regimen sliding scale   SubCutaneous three times a day before meals  insulin lispro (ADMELOG) corrective regimen sliding scale   SubCutaneous at bedtime  lactobacillus acidophilus 1 Tablet(s) Oral two times a day with meals  latanoprost 0.005% Ophthalmic Solution 1 Drop(s) Both EYES at bedtime  levothyroxine 25 MICROGram(s) Oral daily  lidocaine   4% Patch 2 Patch Transdermal daily  LORazepam     Tablet 2 milliGRAM(s) Oral <User Schedule>  melatonin 3 milliGRAM(s) Oral at bedtime  metoprolol succinate ER 50 milliGRAM(s) Oral daily  pantoprazole    Tablet 40 milliGRAM(s) Oral before breakfast  sertraline 200 milliGRAM(s) Oral daily  sevelamer carbonate 800 milliGRAM(s) Oral three times a day with meals  sucralfate 1 Gram(s) Oral four times a day  vancomycin    Solution 125 milliGRAM(s) Oral every 6 hours      MEDICATIONS  (PRN):  acetaminophen     Tablet .. 1000 milliGRAM(s) Oral every 8 hours PRN Moderate Pain (4 - 6)  dextrose Oral Gel 15 Gram(s) Oral once PRN Blood Glucose LESS THAN 70 milliGRAM(s)/deciliter  ondansetron   Disintegrating Tablet 4 milliGRAM(s) Oral two times a day PRN Nausea and/or Vomiting  ondansetron Injectable 4 milliGRAM(s) IV Push every 6 hours PRN Nausea and/or Vomiting       Medications up to date at time of exam.      PHYSICAL EXAMINATION:  Patient has no new complaints.  GENERAL: The patient is a well-developed, well-nourished, in no apparent distress.     Vital Signs Last 24 Hrs  T(C): 36.4 (30 Aug 2023 13:53), Max: 36.5 (29 Aug 2023 21:05)  T(F): 97.6 (30 Aug 2023 13:53), Max: 97.7 (29 Aug 2023 21:05)  HR: 95 (30 Aug 2023 13:53) (73 - 95)  BP: 103/59 (30 Aug 2023 13:53) (92/59 - 167/77)  BP(mean): --  RR: 18 (30 Aug 2023 13:53) (17 - 18)  SpO2: 100% (30 Aug 2023 13:53) (96% - 100%)    Parameters below as of 30 Aug 2023 13:53  Patient On (Oxygen Delivery Method): room air       (if applicable)    Chest Tube (if applicable)    HEENT: Head is normocephalic and atraumatic. Extraocular muscles are intact. Mucous membranes are moist.     NECK: Supple, no palpable adenopathy.    LUNGS: Clear to auscultation, no wheezing, rales, or rhonchi.    HEART: Regular rate and rhythm without murmur.    ABDOMEN: Soft, nontender, and nondistended.  No hepatosplenomegaly is noted.    : No painful voiding, no pelvic pain    EXTREMITIES: Without any cyanosis, clubbing, rash, lesions or edema.    NEUROLOGIC: Awake, alert, oriented, grossly intact    SKIN: Warm, dry, good turgor.      LABS:                        10.5   1.94  )-----------( 85       ( 30 Aug 2023 09:20 )             34.1     08-30    126<L>  |  95<L>  |  40<H>  ----------------------------<  191<H>  6.0<H>   |  23  |  8.43<H>    Ca    7.9<L>      30 Aug 2023 09:20  Phos  4.5     08-30  Mg     2.9     08-30    TPro  6.3  /  Alb  2.9<L>  /  TBili  0.5  /  DBili  x   /  AST  44<H>  /  ALT  91<H>  /  AlkPhos  105  08-30      Urinalysis Basic - ( 30 Aug 2023 09:20 )    Color: x / Appearance: x / SG: x / pH: x  Gluc: 191 mg/dL / Ketone: x  / Bili: x / Urobili: x   Blood: x / Protein: x / Nitrite: x   Leuk Esterase: x / RBC: x / WBC x   Sq Epi: x / Non Sq Epi: x / Bacteria: x                      MICROBIOLOGY: (if applicable)    RADIOLOGY & ADDITIONAL STUDIES:  EKG:   CXR:  ECHO:    IMPRESSION: 62y Male 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      Osteoarthritis      Vision loss of left eye  blind      ESRD on hemodialysis  T/Th/S      Cataract  both eyes - hx of sx done      BPH (benign prostatic hyperplasia)      UTI (urinary tract infection)  hx of      Bladder mass  hx of      H/O hematuria      Osteoporosis      Vision loss of right eye  decreased      Depression      Chronic GERD      Osteomyelitis of vertebra      CHF (congestive heart failure)      Below knee amputation status, left  2012- pt is wearing prostesis      History of right cataract extraction      History of left cataract extraction      S/P arteriovenous (AV) fistula creation  right arm brachiocephalic arteriovenous fistula on 11/08/2018      H/O hematuria  s/p bladder bx and fulguration 2/25/2020      H/O transurethral destruction of bladder lesion  2020      History of excision of mass  back mass on 03/31/2021       p/w          Impression: This is a 62 yr old man  from [facility] [place] with ESRD on HD ( T-Th-Sat ). Current smoker . Presented to ED due to Missed Dialysis, last HD 07-22-23 . Per Patient stated that he often missed HD sessions due to Mild left leg pain and right leg swelling . S/p RRT for SOB with Hypoxia due to Acute Hypoxic Respiratory Failure secondary to Pulmonary edema/ Fluid overload due to Missed Dialysis . CT Abdomen with Small Bilateral Pleural Effusions, Rt Lower Lung with groundglass opacity. No bowel obstruction. Small Gall Stone.   Vomiting due to diabetic gastroparesis vs acute cholecystitis . Had a HAP with s/p Cefepime and Flagyl .       Suggestions:  O2 saturation 98 % room air. So far been saturating good room air.    Neph suggest premedicate with ativan before HD .   Limit fluid intake .  Continue Albuterol 90 mcg 2 puffs Q 6 Hours.   Continue Symbicort 160-4.5 mcg 2 Puffs Twice Daily .    Reinforced the importance of compliance to Dialysis schedule.

## 2023-08-31 NOTE — H&P ADULT - GEN GEN HX ROS MEA POS PC
[id number]
weakness
Hydroxychloroquine Counseling:  I discussed with the patient that a baseline ophthalmologic exam is needed at the start of therapy and every year thereafter while on therapy. A CBC may also be warranted for monitoring.  The side effects of this medication were discussed with the patient, including but not limited to agranulocytosis, aplastic anemia, seizures, rashes, retinopathy, and liver toxicity. Patient instructed to call the office should any adverse effect occur.  The patient verbalized understanding of the proper use and possible adverse effects of Plaquenil.  All the patient's questions and concerns were addressed.

## 2023-10-24 NOTE — H&P PST ADULT - NEGATIVE CARDIOVASCULAR SYMPTOMS
no chest pain/no palpitations/no dyspnea on exertion/no orthopnea/no paroxysmal nocturnal dyspnea/no peripheral edema/no claudication
Her/She

## 2023-11-07 NOTE — PROGRESS NOTE ADULT - ATTENDING SUPERVISION STATEMENT
Medicare Annual Wellness Visit    Obed Post 
Resident

## 2023-11-08 NOTE — ED ADULT TRIAGE NOTE - ADDITIONAL SAFETY/BANDS...
PT GIVEN DISCHARGE INSTRUCTIONS, VERBALIZES UNDERSTANDING. PT ASSESSMENT UNCHANGED, DISCHARGED IN STABLE CONDITION.          Campbell Rahman RN  11/08/23 [id number]
Additional Safety/Bands:

## 2023-11-12 NOTE — PATIENT PROFILE ADULT - MONEY FOR FOOD
NOTIFICATION OF INPATIENT ADMISSION   AUTHORIZATION REQUEST   SERVICING FACILITY:   [address] [place] [address]  Address: [address] [place] [address], [facility] [address] [facility] [address] [address]  Tax ID: [id number]  NPI: [id number] ATTENDING PROVIDER:  Attending Name and NPI#: Jocelin Ponce Md [[id number]]  Address: [address] [place] [address] [address] [facility] [address] [address]  Phone: [phone]   ADMISSION INFORMATION:  Place of Service: Inpatient [address]  Place of Service Code: 21  Inpatient Admission Date/Time: 11/10/23  4:36 PM  Discharge Date/Time: No discharge date for patient encounter. Admitting Diagnosis Code/Description:  Adrenal insufficiency ([address]) [E27.40]  Multiple injuries [T07. XXXA]  Hypotension [I95.9]     UTILIZATION REVIEW CONTACT:  Sheldon Sorenson Bound, Utilization   Network Utilization Review Department  Phone: [phone]  Fax: [phone]  Email: [email or web address]  Contact for approvals/pending authorizations, clinical reviews, and discharge. PHYSICIAN ADVISORY SERVICES:  Medical Necessity Denial & [facility] Review  Phone: [phone]  Fax: [phone]  Email: [email or web address]     DISCHARGE SUPPORT TEAM:  For Patients Discharge Needs & Updates  Phone: [phone] opt. 2 Fax: [phone]  Email: [email or web address] no

## 2023-11-30 NOTE — ED PROVIDER NOTE - PATIENT'S PREFERRED PRONOUN
[FreeTextEntry1] : anemia \par \par seizures \par \par expressive aphasia \par \par controlled hypertension\par \par mild carotid atherosclerosis  Him/He

## 2023-11-30 NOTE — H&P ADULT - NSHPREVIEWOFSYSTEMS_GEN_ALL_CORE
CONSTITUTIONAL: No fever  RESPIRATORY:  No shortness of breath  CARDIOVASCULAR: No chest pain  GASTROINTESTINAL: No abdominal pain.  GENITOURINARY: No dysuria   NEUROLOGICAL: No headaches,  ENDOCRINE: No polyuria  musculoskeletal No muscle aches  HEME: no easy bruisability  SKIN: No itching, burning, rashes, or lesions .

## 2023-11-30 NOTE — ED ADULT TRIAGE NOTE - CHIEF COMPLAINT QUOTE
c/o of multiple episodes of coffee ground emesis today   pt is on dialysis, Rt arm AV fistula, T/Th/Sat, last dialysis is on Tuesday   Lt BKA, pt is legally blind

## 2023-11-30 NOTE — H&P ADULT - ASSESSMENT
63M visually imparied with history of ESRD on dialysis T/TH/sat, last dialysis on 11/28  HTN, NIDDM, hypothyroidism, GERD, COPD, presents from [facility] with report of coffee-ground emesis X3 this AM. Hgb 8, no emesis reported, patient tolerated trial of ice chips CT pending, 2mg ativan given by ED, will admit for Upper GI Bleed, pending CT, GI consult   63M visually imparied with history of ESRD on dialysis T/TH/sat, last dialysis on 11/28  HTN, NIDDM, hypothyroidism, GERD, COPD, presents from [facility] with report of coffee-ground emesis X3 this AM. Hgb 8, no emesis reported, patient tolerated trial of ice chips CT pending, 2mg ativan given by ED, will admit for Upper GI Bleed, pending CT, GI consult   63M visually imparied with history of ESRD on dialysis T/TH/sat, last dialysis on 11/28  HTN, NIDDM, hypothyroidism, GERD, COPD, presents from [facility] with report of coffee-ground emesis X3 this AM. Hgb 8, no emesis reported, patient tolerated trial of ice chips CT pending, 2mg ativan given by ED, will admit for Upper GI Bleed, pending CT, GI consult

## 2023-11-30 NOTE — H&P ADULT - PROBLEM SELECTOR PLAN 2
K stable, mental status stable, no urgent need for dialysis at this time  HD Schedule: Tue/Thur/Sat  Last Session: 11/28  Location: [place]  Urine: unknown  Access: Patient uncooperative with exam    Plan:  Neprhology Consult: Dr Mosher Consulted  Social Work Consult for Reinstatement:  CHG Ordered  Hepatitis Panel K stable, mental status stable, no urgent need for dialysis at this time  HD Schedule: Tue/Thur/Sat  Last Session: 11/28  Location: [place]  Urine: unknown  Access: Patient uncooperative with exam    Plan:  Neprhology Consult: Dr Mosher Consulted  Social Work Consult for Reinstatement:  CHG Ordered  Hepatitis Panel K stable, mental status stable, no urgent need for dialysis at this time  HD Schedule: Tue/Thur/Sat  Last Session: 11/28  Location: [place]  Urine: unknown  Access: Patient uncooperative with exam    Plan:  Neprhology Consult: Dr Mosher Consulted  Social Work Consult for Reinstatement:  CHG Ordered  Hepatitis Panel

## 2023-11-30 NOTE — ED ADULT NURSE NOTE - CHIEF COMPLAINT
----- Message from Nancy Mckenna MA sent at 2/15/2023  7:46 AM CST -----  Regarding: Dr JOSÉ MONTEZ Monday 2-27-23       1. Are there any outstanding Labs, imaging or referrals in the patient's chart?      3 month follow up for ADHD    No labs required    Last wellness 8-16-22           2. . Has the patient been seen in an ER, urgent care clinic, or any other health care    provider since their last visit? If yes when and where?           The patient is a 63y Male complaining of

## 2023-11-30 NOTE — ED ADULT NURSE NOTE - OBJECTIVE STATEMENT
pt stated that they said he vomited blood he said he did vomit x3 but he does not know the color because he is blind, pt has AV fistula right arm , left BKA.

## 2023-11-30 NOTE — ED PROVIDER NOTE - OBJECTIVE STATEMENT
63-year-old male with history of ESRD on dialysis T/TH/sat, last dialysis on Tuesday, HTN, NIDDM, hypothyroidism, GERD, COPD, presents from [facility] with report of coffee-ground emesis today. Patient states he vomited approximately 3 times today, does not know the color as he has blind. Patient yells repeatedly that he wants water. Denies all other symptoms including abdominal pain, diarrhea, constipation, chest pain, shortness of breath, fever, urinary symptoms. Review of papers from nursing home show that patient is on aspirin 81 and no other antiplatelet/anticoagulant agents. Patient reports past history of cocaine use, last 10 years ago. Denies history of alcohol use or liver disease.

## 2023-11-30 NOTE — H&P ADULT - PROBLEM SELECTOR PLAN 1
per [facility]  Coffee ground emesis X3 in AM  However no emesis all day in ED, ice trips trial tolerated  CLD  IV protonix BID  Sucralfate  PRN Zofran  GI Consult ITZ Jones/Dr. Lee per [facility]  Coffee ground emesis X3 in AM  However no emesis all day in ED, ice trips trial tolerated  CLD  IV protonix BID  Sucralfate  PRN Zofran  GI Consult ITZ Jones/Dr. Lee per [facility]  Coffee ground emesis X3 in AM  However no emesis all day in ED, ice trips trial tolerated  CLD  IV protonix BID  Sucralfate  PRN Zofran  GI Consult ITZ Jones/Dr. Lee per [facility]  Coffee ground emesis X3 in AM  However no emesis all day in ED, ice trips trial tolerated  CLD  IV protonix BID  Sucralfate  PRN Virgil SYKES Consulted ITZ Jones/Dr. Lee per [facility]  Coffee ground emesis X3 in AM  However no emesis all day in ED, ice trips trial tolerated  CLD  IV protonix BID  Sucralfate  PRN Virgil SYKES Consulted ITZ Jones/Dr. Lee per Health system  Coffee ground emesis X3 in AM  However no emesis all day in ED, ice trips trial tolerated  CLD  IV protonix BID  Sucralfate  PRN Virgil SYKES Consulted ITZ Jones/Dr. Lee

## 2023-11-30 NOTE — H&P ADULT - NSHPPHYSICALEXAM_GEN_ALL_CORE
GENERAL: NAD, thin, cachectic main  HEAD:  Atraumatic, Normocephalic  EYES: Cataracts on both eyes  NERVOUS SYSTEM:  Alert & Oriented to self and place, uncooperative with rest of assessment  CHEST/LUNG: Lungs clear to auscultation bilaterally,  HEART: Regular rate and rhythm  ABDOMEN: Soft, Nontender, Nondistended;  EXTREMITIES:  2+ Peripheral Pulses, No clubbing, cyanosis, or edema  LYMPH: No lymphadenopathy noted  SKIN: No rashes or lesions;  Good capillary refill

## 2023-11-30 NOTE — H&P ADULT - HISTORY OF PRESENT ILLNESS
63M visually imparied with history of ESRD on dialysis T/TH/sat, last dialysis on 11/28  HTN, NIDDM, hypothyroidism, GERD, COPD, presents from [facility] with report of coffee-ground emesis X3 this AM. Patient uncooperative with rest of history constantly demands food, denies any other complaints.    Denies HA, CP, SOB, NVD    In the ED, Hgb 8, no emesis reported, patient tolerated trial of ice chips CT pending, 2mg ativan given by ED 63M visually imparied with history of ESRD on dialysis T/TH/sat, last dialysis on 11/28  HTN, NIDDM, hypothyroidism, GERD, COPD, presents from [facility] with report of coffee-ground emesis X3 this AM. Patient uncooperative with rest of history constantly demands food, denies any other complaints.    Denies HA, CP, SOB, NVD    In the ED, Hgb 8, no emesis reported, patient tolerated trial of ice chips CT pending, 2mg ativan given by ED 63M visually imparied with history of ESRD on dialysis T/TH/sat, last dialysis on 11/28  HTN, NIDDM, hypothyroidism, GERD, COPD, presents from [facility] with report of coffee-ground emesis X3 this AM. Patient uncooperative with rest of history constantly demands food, denies any other complaints.    Denies HA, CP, SOB, NVD    In the ED, Hgb 8, no emesis reported, patient tolerated trial of ice chips CT pending, 2mg ativan given by ED

## 2023-11-30 NOTE — ED PROVIDER NOTE - CLINICAL SUMMARY MEDICAL DECISION MAKING FREE TEXT BOX
Abdomen entirely nontender and benign, with low suspicion for acute intra-abdominal process that would benefit from CT imaging at this time. No work of breathing or rales or fluid overload or gross electrolyte abnormalities _______________ to warrant emergent dialysis. Abdomen entirely nontender and benign, with low suspicion for acute intra-abdominal process that would benefit from CT imaging at this time. No work of breathing or rales or fluid overload or gross electrolyte abnormalities to warrant emergent dialysis. Noted anemia to 8. Observed here for 4 and half hours with no recurrent emesis or bleeding noted. Discussed with PMD Dr. De La Torre and will admit. Of note, patient at one point became agitated, demanding to have something to drink. Concern for worsening of a possible bleed if he drinks. Patient yelling, hit this provider twice, code gray called by nursing. Review of his records shows that he takes 2 mg lorazepam 3 times a day, and this was given to him IV. Patient nontoxic appearing, hemodynamically stable. Admitted to internal medicine for further monitoring, w/u, and care.

## 2023-11-30 NOTE — ED PROVIDER NOTE - CARE PLAN
Principal Discharge DX:	Anemia  Secondary Diagnosis:	Vomiting  Secondary Diagnosis:	HTN (hypertension)  Secondary Diagnosis:	ESRD on hemodialysis   1

## 2023-11-30 NOTE — ED PROVIDER NOTE - PHYSICAL EXAMINATION
Afebrile, hemodynamically stable, saturating well on room air  NAD, cachectic, nontoxic appearing, laying comfortably in bed, no WOB/tachypnea, speaking full sentences  Head NCAT  EOMI grossly, cataracts  MMM  RRR, nml S1/S2, no m/r/g  Lungs CTAB, no w/r/r  Abd soft, entirely NT, ND, nml BS, no rebound or guarding  AAO, CN's 3-12 grossly intact  HUMPHREY spontaneously, L knee amputation  Skin warm, dry

## 2023-11-30 NOTE — ED ADULT NURSE NOTE - NSFALLHARMRISKINTERV_ED_ALL_ED

## 2023-12-01 NOTE — PATIENT PROFILE ADULT - FUNCTIONAL ASSESSMENT - BASIC MOBILITY SCORE.
Breast feeding well with nipple shield. Harder time on right then left side. Voids and stools age appropriate.   19

## 2023-12-01 NOTE — PATIENT PROFILE ADULT - FALL HARM RISK - HARM RISK INTERVENTIONS

## 2023-12-01 NOTE — PROGRESS NOTE ADULT - ATTENDING COMMENTS
# RECURRENT INTRACTABLE NAUSEA/VOMITING, ? COFFEE GROUND EMESIS  # GASTROPARESIS  # HISTORY OF ESOPHAGITIS AND DUODENITIS [1/2021], HX OF GASTROPARESIS W/ EVIDENCE OF THICKENED ESOPHAGUS ON PREVIOUS CT SCAN   # PANCREAS W/ DOUBLE DUCT SIGN    - MONITORING HGB, PLACED ON PPI BID , CARAFATE QID  - PRN ANTIEMETICS    - MONITORING FOR SYMPTOMS  - WHILE ON DIET PATIENT REPEATEDLY COUNSELLED TO CHEW FOOD CAUTIOUSLY AND CONSUME SMALL BITES W/ REGULAR CLEARING WITH WATER BETWEEN BITES    - ADVANCE DIET AS TOLERATED  - NOTED CT A/P    - GI CONSULT    # EPISODE OF HYPOGLYCEMIA - IMPROVED  # GASTROPARESIS  # UNDERLYING DM  - SSI + FS    # UNCONTROLLED HTN  # HYPERKALEMIA  # ESRD ON HD TTS - W/ HX OF NONCOMPLIANCE    - TELEMETRY  - HYDRALAZINE, METOPROLOL AND NORVASC ; PRN IV ANTIHYPERTENSIVE  - LOKELMA  - SUPPLEMENTAL O2  - PLANNED FOR HD  - NEPHROLOGY CONSULT  - PULMONOLOGY CONSULT  - ID CONSULT    # DEPRESSION  - PLACED ON ZOLOFT    # PATIENT UNDERGOING OUTPATIENT WORKUP FOR CENTRAL VENOUS STENOSIS THROUGH NEPHROLOGY TEAM  - ? s/p STENT PLACEMENT    # ANEMIA OF CKD  # HX OF PANCYTOPENIA   - TREND HGB, TRANSFUSION THRESHOLD HGB < 7  - TYPE AND SCREEN  - ON EPO    - PPI BID, CARAFATE QID    # HX OF COPD  # PULMONARY HYPERTENSION    # SEVERE PROTEIN CALORIE MALNUTRITION, FAILURE TO THRIVE   -  TEMPORAL WASTING, LOSS OF MUSCLE MASS FROM SHOULDER AND HIP GIRDLE  - NUTRITIONAL SUPPLEMENT    # HLD  # PARTIALLY BLIND  # S/P LEFT BKA  # HX OF PVD  # LS SPINAL STENOSIS  # GI AND DVT PPX

## 2023-12-01 NOTE — PROGRESS NOTE ADULT - PROBLEM SELECTOR PLAN 2
K stable, mental status stable, no urgent need for dialysis at this time. HD Schedule: Tue/Thur/Sat  Location: [place]. Last Session: 12/1 after admission.   Access: Left AVF on forearm.   Neprhology Dr Mosher Consulted  Social Work Consult for Reinstatement of HD  started epogen on HD and sevelamer.  CHG Ordered  f/u Hepatitis Panel (pt refused labs)

## 2023-12-01 NOTE — CONSULT NOTE ADULT - ASSESSMENT
1. Hematemesis (coffee ground emesis)  2. R/o erosive esophagitis  3. R/o peptic ulcer disease  4. R/o Ethel Renee tear    Suggestions:    1. Monitor H/H  2. Transfuse PRBC as needed  3. Protonix 40mg daily  4. Avoid NSAID  5. Diet as tolerated  6. EGD out patient  7. DVT prophylaxis

## 2023-12-01 NOTE — PATIENT PROFILE ADULT - FALL HARM RISK - FACTORS
VIRA. Didn't bring his prosthetic leg to the hospital/Impaired gait/IV and/or equipment tethered to patient/Weakness/Other

## 2023-12-01 NOTE — PROGRESS NOTE ADULT - PROBLEM SELECTOR PLAN 1
per [facility] Coffee ground emesis X3 in AM  However no emesis all day in ED, ice trips trial tolerated  Diet: DASH diet  IV protonix BID  Sucralfate qid  PRN Zofran  GI Consulted ITZ Jones/Dr. Lee  EGD to be done outpatient

## 2023-12-01 NOTE — CHART NOTE - NSCHARTNOTEFT_GEN_A_CORE
Notified by RN who is familiar with patient on prior admissions that patient usually requests for nebulization prn otherwise he will have a "panic attack" and an RRT is usually called on prior admissions.     However patient has no known medical conditions which require PRN or standing nebulization. Patient is saturating well on RA of note, examination of respiratory system was benign. Hence no order for nebulization was placed.

## 2023-12-01 NOTE — CHART NOTE - NSCHARTNOTEFT_GEN_A_CORE
Paged by RN - pt. requesting for Nebulizations    Chart review- Hx of COPD on Rx with inhalers- Symbicort standing and Albuterol PRN    Labs from 4:30 PM - K 5.7    Pt examined bedside-    VS - stable, was satting well on RA, RN transitioned to  2 L NC    Physical    Chest-    PLAN-    - Hyperkalemia- Will give cocktail ( insulin D50)                      - f/u BMP in 2 h    - Given patient is satting well on RA--> transitioned to 2 L NC at request and chest is clear, will hold off nebulizations and wean O2 to RA as tolerated Paged by RN - pt. requesting for Nebulizations    Chart review- Hx of COPD on Rx with inhalers- Symbicort standing and Albuterol PRN    Labs from 4:30 PM - K 5.7    Pt examined bedside    - Patient is uncooperative with interview and exam, speaking in full sentences.    Patients direct spoken words- " Why the fuck are you here    AO x 3    VS - stable, was satting well on RA, RN transitioned to  2 L NC upon request    Physical    Chest- BL NVBS + no wheeze/ added breath sounds/ increased WOB    PLAN-    - Hyperkalemia- Will give cocktail ( insulin D50)                        - f/u BMP in 2 h    - Given patient is satting well on RA--> transitioned to 2 L NC at request and chest is clear, will hold off nebulizations and wean O2 to RA as tolerated PGY1 Bianca discussed with PGY 3 Julio Cesar    Paged by RN - pt. requesting for Nebulizations    Chart review- Hx of COPD on Rx with inhalers- Symbicort standing and Albuterol PRN    Labs from 4:30 PM - K 5.7    Pt examined bedside    - Patient is uncooperative with interview and exam, speaking in full sentences, no accessory RS muscle usage.    Patients direct spoken words- " Why the f**k are you here, get the f**k out of my room, you wh*re" inspite of trying to de escalate the situation, patient was repeatedly verbally abusive.     AO x 3    Patient uncooperative but denies chest pain/ dizziness/headache/tingling/ numbness    VS - stable, was satting well on RA, RN transitioned to  2 L NC upon request    Physical    Chest- BL NVBS + no wheeze/ added breath sounds/ increased WOB/ NO crackles      NOTE- on leaving the room, patient was back to baseline- agitated, but quietly sitting in bed.     PLAN-    - Hyperkalemia- Will give cocktail ( insulin D50)                        - f/u BMP in 2 h    - Given patient is satting well on RA--> transitioned to 2 L NC at request and chest is clear, will hold off nebulizations and wean O2 to RA as tolerated    ================================================================    RRT was called on patient for Hypoglycemia- See RRT note

## 2023-12-01 NOTE — CHART NOTE - NSCHARTNOTEFT_GEN_A_CORE
Was notified that ED Attending did not place Admit Order, placed admit order. Tried to reach ED, to correct, however no response.

## 2023-12-01 NOTE — CONSULT NOTE ADULT - ASSESSMENT
62 year old male from [facility], walks with RW, with PMH of ESRD on HD (TTS), BKA- L w/prosthetic leg, DM, Left eye blindness, CHF, CAD, HTN, HLD, osteoporosis, bronchitis, current smoker, and anemia who presents with complaint of coffee ground emesis    # ESRD. on HD TTS  as missed HD yesterday, will schedule for HD today   UF with HD   D/W pt compliance with HD   # anemia of CKD and Gastrologist bleed. epogen on HD. Transfuse for HBG <7. Gastrointestinal bleeding   #CKDMBD. cont sevelamer  # HTN. blood pressure at goal  # PVD Left BKA, no active issues

## 2023-12-01 NOTE — PROGRESS NOTE ADULT - SUBJECTIVE AND OBJECTIVE BOX
PGY-1 Progress Note discussed with attending    PAGER #: [[phone]] TILL 5:00 PM  PLEASE CONTACT ON CALL TEAM:  - On Call Team (Please refer to Dejon) FROM 5:00 PM - 8:30PM  - Nightfloat Team FROM 8:30 -7:30 AM    INTERVAL HPI/OVERNIGHT EVENTS: No acute events overnight.  Patient examined at bedside this AM.  Patient denies acute complaints.  NO emesis, abd pain, n/v, sob , chest pain.      MEDICATIONS  (STANDING):  amLODIPine   Tablet 10 milliGRAM(s) Oral daily  atorvastatin 40 milliGRAM(s) Oral at bedtime  budesonide 160 MICROgram(s)/formoterol 4.5 MICROgram(s) Inhaler 2 Puff(s) Inhalation two times a day  chlorhexidine 2% Cloths 1 Application(s) Topical <User Schedule>  dextrose 5% + lactated ringers. 1000 milliLiter(s) (100 mL/Hr) IV Continuous <Continuous>  epoetin beverley (PROCRIT) Injectable 89111 Unit(s) IV Push <User Schedule>  folic acid 1 milliGRAM(s) Oral daily  hydrALAZINE 25 milliGRAM(s) Oral three times a day  insulin lispro (ADMELOG) corrective regimen sliding scale   SubCutaneous Before meals and at bedtime  latanoprost 0.005% Ophthalmic Solution 1 Drop(s) Both EYES at bedtime  levothyroxine 25 MICROGram(s) Oral daily  LORazepam     Tablet 2 milliGRAM(s) Oral <User Schedule>  metoprolol tartrate 25 milliGRAM(s) Oral two times a day  pantoprazole  Injectable 40 milliGRAM(s) IV Push every 12 hours  sertraline 200 milliGRAM(s) Oral daily  sevelamer carbonate 800 milliGRAM(s) Oral three times a day with meals    MEDICATIONS  (PRN):  acetaminophen     Tablet .. 650 milliGRAM(s) Oral every 6 hours PRN Temp greater or equal to 38C (100.4F), Mild Pain (1 - 3)  albuterol    90 MICROgram(s) HFA Inhaler 2 Puff(s) Inhalation every 6 hours PRN Bronchospasm  melatonin 3 milliGRAM(s) Oral at bedtime PRN Insomnia  ondansetron Injectable 4 milliGRAM(s) IV Push every 8 hours PRN Nausea and/or Vomiting      REVIEW OF SYSTEMS:  CONSTITUTIONAL: No fever, weight loss, or fatigue  RESPIRATORY: No shortness of breath  CARDIOVASCULAR: No chest pain  GASTROINTESTINAL: No abdominal pain.  GENITOURINARY: No dysuria  NEUROLOGICAL: No headaches  SKIN: No itching, burning, rashes    Vital Signs Last 24 Hrs  T(C): 36.9 (01 Dec 2023 15:20), Max: 37.3 (01 Dec 2023 05:19)  T(F): 98.4 (01 Dec 2023 15:20), Max: 99.2 (01 Dec 2023 05:19)  HR: 78 (01 Dec 2023 15:20) (76 - 90)  BP: 156/70 (01 Dec 2023 15:20) (138/67 - 202/100)  BP(mean): 125 (01 Dec 2023 00:18) (125 - 125)  RR: 18 (01 Dec 2023 15:20) (18 - 20)  SpO2: 92% (01 Dec 2023 15:20) (90% - 100%)    Parameters below as of 01 Dec 2023 15:20  Patient On (Oxygen Delivery Method): room air        PHYSICAL EXAMINATION:  GENERAL: NAD, well built  HEAD:  Atraumatic, Normocephalic  EYES:  conjunctiva and sclera clear  CHEST/LUNG: Clear to auscultation. No rales, rhonchi, wheezing, or rubs  HEART: Regular rate and rhythm; No murmurs, rubs, or gallops  ABDOMEN: Soft, Nontender, Nondistended; Bowel sounds present  NERVOUS SYSTEM:  Alert & Oriented X3,    EXTREMITIES:  2+ Peripheral Pulses, No clubbing, cyanosis, or edema. Left AVF on left forearm.   SKIN: warm dry                            8.0    3.09  )-----------( Clumped    ( 30 Nov 2023 21:08 )             26.6     11-30    140  |  106  |  49<H>  ----------------------------<  73  5.1   |  26  |  12.70<H>    Ca    7.9<L>      30 Nov 2023 20:45  Phos  5.2     12-01    TPro  6.1  /  Alb  2.7<L>  /  TBili  0.5  /  DBili  x   /  AST  19  /  ALT  10  /  AlkPhos  75  11-30    LIVER FUNCTIONS - ( 30 Nov 2023 20:45 )  Alb: 2.7 g/dL / Pro: 6.1 g/dL / ALK PHOS: 75 U/L / ALT: 10 U/L DA / AST: 19 U/L / GGT: x               PT/INR - ( 30 Nov 2023 21:08 )   PT: 11.6 sec;   INR: 1.02 ratio         PTT - ( 30 Nov 2023 21:08 )  PTT:34.9 sec    CAPILLARY BLOOD GLUCOSE      RADIOLOGY & ADDITIONAL TESTS:

## 2023-12-01 NOTE — CONSULT NOTE ADULT - SUBJECTIVE AND OBJECTIVE BOX
[  ] STAT REQUEST              [  ]ROUTINE REQUEST    Patient is a 63 year old male with hematemesis. GI consulted to evaluate.        HPI:  63M visually imparied with history of ESRD on dialysis T/TH/sat, last dialysis on 11/28  HTN, NIDDM, hypothyroidism, GERD, COPD, presents from [facility] with report of coffee-ground emesis X3 this AM. Patient uncooperative with with history and examination but de denies abdominal pain, nausea, vomiting, chest pain and SOB.          PAST MEDICAL HISTORY     Hypertension    Adrenal insufficiency    CKD (chronic kidney disease)    Anemia    Glaucoma    Coronary artery disease    HLD (hyperlipidemia)    Peripheral vascular disease    Spinal stenosis of lumbosacral region    Hyperparathyroidism    Diabetes mellitus    Diabetic neuropathy    Contracture of hand    Osteoarthritis    Osteoporosis    Vision loss of left eye    ESRD on hemodialysis    Cataract    BPH (benign prostatic hyperplasia)    UTI (urinary tract infection)    Bladder mass    H/O hematuria    Osteoporosis    Vision loss of right eye    Depression    Chronic GERD    Osteomyelitis of vertebra    CHF (congestive heart failure)        PAST SURGICAL HISTORY     Below knee amputation status, left    History of right cataract extraction    History of left cataract extraction    S/P arteriovenous (AV) fistula creation    H/O hematuria    H/O transurethral destruction of bladder lesion    History of excision of mass        Allergies    No Known Drug Allergies  fish (Rash)  liver (Anaphylaxis)    Intolerances  None         MEDICATIONS  (STANDING):  amLODIPine   Tablet 10 milliGRAM(s) Oral daily  atorvastatin 40 milliGRAM(s) Oral at bedtime  budesonide 160 MICROgram(s)/formoterol 4.5 MICROgram(s) Inhaler 2 Puff(s) Inhalation two times a day  chlorhexidine 2% Cloths 1 Application(s) Topical <User Schedule>  folic acid 1 milliGRAM(s) Oral daily  hydrALAZINE 25 milliGRAM(s) Oral three times a day  insulin lispro (ADMELOG) corrective regimen sliding scale   SubCutaneous Before meals and at bedtime  latanoprost 0.005% Ophthalmic Solution 1 Drop(s) Both EYES at bedtime  levothyroxine 25 MICROGram(s) Oral daily  LORazepam     Tablet 2 milliGRAM(s) Oral <User Schedule>  metoprolol tartrate 25 milliGRAM(s) Oral two times a day  pantoprazole  Injectable 40 milliGRAM(s) IV Push every 12 hours  sertraline 200 milliGRAM(s) Oral daily  sevelamer carbonate 800 milliGRAM(s) Oral three times a day with meals    MEDICATIONS  (PRN):  acetaminophen     Tablet .. 650 milliGRAM(s) Oral every 6 hours PRN Temp greater or equal to 38C (100.4F), Mild Pain (1 - 3)  albuterol    90 MICROgram(s) HFA Inhaler 2 Puff(s) Inhalation every 6 hours PRN Bronchospasm  aluminum hydroxide/magnesium hydroxide/simethicone Suspension 30 milliLiter(s) Oral every 4 hours PRN Dyspepsia  melatonin 3 milliGRAM(s) Oral at bedtime PRN Insomnia  ondansetron Injectable 4 milliGRAM(s) IV Push every 8 hours PRN Nausea and/or Vomiting      SOCIAL HISTORY  Advanced Directives:       [ X ] Full Code       [  ] DNR  Marital Status:         [  ] M      [ X ] S      [  ] D       [  ] W  Children:       [  ] Yes      [ X ] No  Occupation:        [  ] Employed       [ X ] Unemployed       [  ] Retired  Diet:       [ X ] Regular       [  ] PEG feeding          [  ] NG tube feeding  Drug Use:           [X ] Patient denied          [  ] Yes  Alcohol:           [ X ] No             [  ] Yes (socially)         [  ] Yes (chronic)  Tobacco:           [  ] Yes           [X  ] No    FAMILY HISTORY  [ X ] Heart Disease            [ X ] Diabetes             [ X ] HTN             [  ] Colon Cancer             [  ] Stomach Cancer              [  ] Pancreatic Cancer    VITAL SIGNS   Vital Signs Last 24 Hrs  T(C): 36.4 (01 Dec 2023 05:41), Max: 37.3 (01 Dec 2023 05:19)  T(F): 97.5 (01 Dec 2023 05:41), Max: 99.2 (01 Dec 2023 05:19)  HR: 84 (01 Dec 2023 05:41) (82 - 90)  BP: 152/87 (01 Dec 2023 05:41) (152/87 - 202/100)  BP(mean): 125 (01 Dec 2023 00:18) (125 - 125)  RR: 18 (01 Dec 2023 05:41) (18 - 20)  SpO2: 100% (01 Dec 2023 05:41) (93% - 100%)  Parameters below as of 01 Dec 2023 05:41  Patient On (Oxygen Delivery Method): room air           CBC Full  -  ( 30 Nov 2023 21:08 )  WBC Count : 3.09 K/uL  RBC Count : 2.75 M/uL  Hemoglobin : 8.0 g/dL  Hematocrit : 26.6 %  Platelet Count - Automated : Clumped K/uL  Mean Cell Volume : 96.7 fl  Mean Cell Hemoglobin : 29.1 pg  Mean Cell Hemoglobin Concentration : 30.1 gm/dL  Auto Neutrophil # : 2.53 K/uL  Auto Lymphocyte # : 0.43 K/uL  Auto Monocyte # : 0.03 K/uL  Auto Eosinophil # : 0.09 K/uL  Auto Basophil # : 0.00 K/uL  Auto Neutrophil % : 80.0 %  Auto Lymphocyte % : 14.0 %  Auto Monocyte % : 1.0 %  Auto Eosinophil % : 3.0 %  Auto Basophil % : 0.0 %      11-30    140  |  106  |  49<H>  ----------------------------<  73  5.1   |  26  |  12.70<H>    Ca    7.9<L>      30 Nov 2023 20:45    TPro  6.1  /  Alb  2.7<L>  /  TBili  0.5  /  DBili  x   /  AST  19  /  ALT  10  /  AlkPhos  75  11-30    Lipase: 7 U/L (11-30 @ 20:45)     PT/INR - ( 30 Nov 2023 21:08 )   PT: 11.6 sec;   INR: 1.02 ratio       PTT - ( 30 Nov 2023 21:08 )  PTT:34.9 sec    Iron with Total Binding Capacity (04.30.22 @ 19:19)   % Saturation, Iron: 22 %  Iron - Total Binding Capacity.: 217 ug/dL  Iron Total, Serum: 48 ug/dL  Unsaturated Iron Binding Capacity: 169 ug/dL    Urinalysis (08.23.19 @ 15:52)   Glucose Qualitative, Urine: Negative  Blood, Urine: Large  pH Urine: 6.5  Color: Yellow  Urine Appearance: Slightly Turbid  Bilirubin: Negative  Ketone - Urine: Negative  Specific Gravity: 1.010  Protein, Urine: 100  Urobilinogen: Negative  Nitrite: Negative  Leukocyte Esterase Concentration: Moderate    ECG (07.26.23 @ 11:17) >    Ventricular Rate 78 BPM    Atrial Rate 78 BPM    P-R Interval 228 ms    QRS Duration 80 ms    Q-T Interval 414 ms    QTC Calculation(Bazett) 471 ms    P Axis 53 degrees    R Axis 3 degrees    T Axis 38 degrees    Diagnosis Line Sinus rhythm with 1st degree A-V block  Otherwise normal ECG

## 2023-12-01 NOTE — CONSULT NOTE ADULT - SUBJECTIVE AND OBJECTIVE BOX
[facility] : Progress Note :: [phone], (office [phone]),   Dr Mosher / Dr Washington / Dr Young / Dr Doll / Dr Varsha TURCIOS / Dr Tello / Dr Garcia / Dr Larry qiu  _____________________________________________________________________________________________  Patient is a 63y Male whom presented to the hospital with hematemesis.   He has ESRD on HD TTS at [facility]. Presented to ED with coffee ground emesis.  He missed HD yesterday.  renal consulted for ESRD.    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      Osteoarthritis      Vision loss of left eye  blind      ESRD on hemodialysis  T/Th/S      Cataract  both eyes - hx of sx done      BPH (benign prostatic hyperplasia)      UTI (urinary tract infection)  hx of      Bladder mass  hx of      H/O hematuria      Osteoporosis      Vision loss of right eye  decreased      Depression      Chronic GERD      Osteomyelitis of vertebra      CHF (congestive heart failure)      Below knee amputation status, left  2012- pt is wearing prostesis      History of right cataract extraction      History of left cataract extraction      S/P arteriovenous (AV) fistula creation  right arm brachiocephalic arteriovenous fistula on 11/08/2018      H/O hematuria  s/p bladder bx and fulguration 2/25/2020      H/O transurethral destruction of bladder lesion  2020      History of excision of mass  back mass on 03/31/2021        No Known Drug Allergies  fish (Rash)  liver (Anaphylaxis)    Home Medications Reviewed  Hospital Medications:   MEDICATIONS  (STANDING):  amLODIPine   Tablet 10 milliGRAM(s) Oral daily  atorvastatin 40 milliGRAM(s) Oral at bedtime  budesonide 160 MICROgram(s)/formoterol 4.5 MICROgram(s) Inhaler 2 Puff(s) Inhalation two times a day  chlorhexidine 2% Cloths 1 Application(s) Topical <User Schedule>  dextrose 5% + lactated ringers. 1000 milliLiter(s) (100 mL/Hr) IV Continuous <Continuous>  epoetin beverley (PROCRIT) Injectable 62041 Unit(s) IV Push <User Schedule>  folic acid 1 milliGRAM(s) Oral daily  hydrALAZINE 25 milliGRAM(s) Oral three times a day  insulin lispro (ADMELOG) corrective regimen sliding scale   SubCutaneous Before meals and at bedtime  latanoprost 0.005% Ophthalmic Solution 1 Drop(s) Both EYES at bedtime  levothyroxine 25 MICROGram(s) Oral daily  LORazepam     Tablet 2 milliGRAM(s) Oral <User Schedule>  metoprolol tartrate 25 milliGRAM(s) Oral two times a day  pantoprazole  Injectable 40 milliGRAM(s) IV Push every 12 hours  sertraline 200 milliGRAM(s) Oral daily  sevelamer carbonate 800 milliGRAM(s) Oral three times a day with meals    SOCIAL HISTORY:  Denies ETOh,Smoking,   FAMILY HISTORY:  Family history of cirrhosis of liver (Mother)    Family history of renal failure (Sibling)    Family history of hypertension (Sibling)    Family history of diabetes mellitus (Sibling)    History of substance abuse in sibling (Sibling)          VITALS:  T(F): 97.5 (12-01-23 @ 05:41), Max: 99.2 (12-01-23 @ 05:19)  HR: 84 (12-01-23 @ 05:41)  BP: 152/87 (12-01-23 @ 05:41)  RR: 18 (12-01-23 @ 05:41)  SpO2: 100% (12-01-23 @ 05:41)  Wt(kg): --      PHYSICAL EXAM:  Constitutional: NAD  HEENT: anicteric sclera, oropharynx clear,  Neck: No JVD  Respiratory: CTAB, no wheezes, rales or rhonchi  Cardiovascular: S1, S2, RRR  Gastrointestinal: BS+, soft, NT/ND  Extremities: No peripheral edema  Neurological: A/O x 3, no focal deficits.  Vascular Access: RUEAVF with thrill and bruit     LABS:  11-30    140  |  106  |  49<H>  ----------------------------<  73  5.1   |  26  |  12.70<H>    Ca    7.9<L>      30 Nov 2023 20:45  Phos  5.2     12-01    TPro  6.1  /  Alb  2.7<L>  /  TBili  0.5  /  DBili      /  AST  19  /  ALT  10  /  AlkPhos  75  11-30    Creatinine Trend: 12.70 <--                        8.0    3.09  )-----------( Clumped    ( 30 Nov 2023 21:08 )             26.6     Urine Studies:  Urinalysis Basic - ( 30 Nov 2023 20:45 )    Color:  / Appearance:  / SG:  / pH:   Gluc: 73 mg/dL / Ketone:   / Bili:  / Urobili:    Blood:  / Protein:  / Nitrite:    Leuk Esterase:  / RBC:  / WBC    Sq Epi:  / Non Sq Epi:  / Bacteria:         RADIOLOGY & ADDITIONAL STUDIES:

## 2023-12-01 NOTE — PROGRESS NOTE ADULT - ASSESSMENT
63M visually imparied with history of ESRD on dialysis T/TH/sat, last dialysis on 11/28  HTN, NIDDM, hypothyroidism, GERD, COPD, presents from [facility] with report of coffee-ground emesis X3 this AM. Hgb 8, no emesis reported, patient tolerated trial of ice chips CT pending, 2mg ativan given by ED, will admit for Upper GI Bleed, pending CT, GI consult

## 2023-12-02 NOTE — PROGRESS NOTE ADULT - SUBJECTIVE AND OBJECTIVE BOX
Patient is a 63y old  Male who presents with a chief complaint of Hematemesis (02 Dec 2023 17:24)    PATIENT IS SEEN AND EXAMINED IN MEDICAL FLOOR.      ALLERGIES:  No Known Drug Allergies  fish (Rash)  liver (Anaphylaxis)      VITALS:    Vital Signs Last 24 Hrs  T(C): 36.9 (02 Dec 2023 21:35), Max: 37.1 (02 Dec 2023 14:35)  T(F): 98.4 (02 Dec 2023 21:35), Max: 98.8 (02 Dec 2023 14:35)  HR: 82 (02 Dec 2023 21:35) (82 - 94)  BP: 126/71 (02 Dec 2023 21:35) (126/71 - 181/91)  BP(mean): --  RR: 17 (02 Dec 2023 21:35) (17 - 19)  SpO2: 95% (02 Dec 2023 21:35) (85% - 95%)    Parameters below as of 02 Dec 2023 21:35  Patient On (Oxygen Delivery Method): room air        LABS:    CBC Full  -  ( 02 Dec 2023 01:50 )  WBC Count : 2.86 K/uL  RBC Count : 2.91 M/uL  Hemoglobin : 8.3 g/dL  Hematocrit : 27.4 %  Platelet Count - Automated : 45 K/uL  Mean Cell Volume : 94.2 fl  Mean Cell Hemoglobin : 28.5 pg  Mean Cell Hemoglobin Concentration : 30.3 gm/dL  Auto Neutrophil # : 2.52 K/uL  Auto Lymphocyte # : 0.34 K/uL  Auto Monocyte # : 0.00 K/uL  Auto Eosinophil # : 0.00 K/uL  Auto Basophil # : 0.00 K/uL  Auto Neutrophil % : 88.0 %  Auto Lymphocyte % : 12.0 %  Auto Monocyte % : 0.0 %  Auto Eosinophil % : 0.0 %  Auto Basophil % : 0.0 %      12-02    136  |  99  |  24<H>  ----------------------------<  53<LL>  4.3   |  30  |  8.70<H>    Ca    8.4      02 Dec 2023 01:50  Phos  3.0     12-02  Mg     2.6     12-02    TPro  6.5  /  Alb  3.0<L>  /  TBili  0.6  /  DBili  x   /  AST  17  /  ALT  11  /  AlkPhos  79  12-02    CAPILLARY BLOOD GLUCOSE      POCT Blood Glucose.: 182 mg/dL (02 Dec 2023 21:24)  POCT Blood Glucose.: 134 mg/dL (02 Dec 2023 17:05)  POCT Blood Glucose.: 101 mg/dL (02 Dec 2023 11:54)  POCT Blood Glucose.: 100 mg/dL (02 Dec 2023 08:09)  POCT Blood Glucose.: 196 mg/dL (02 Dec 2023 02:15)  POCT Blood Glucose.: 62 mg/dL (02 Dec 2023 01:49)  POCT Blood Glucose.: 42 mg/dL (02 Dec 2023 01:09)  POCT Blood Glucose.: 138 mg/dL (01 Dec 2023 23:52)    CARDIAC MARKERS ( 02 Dec 2023 01:50 )  x     / x     / 257 U/L / x     / 5.4 ng/mL      LIVER FUNCTIONS - ( 02 Dec 2023 01:50 )  Alb: 3.0 g/dL / Pro: 6.5 g/dL / ALK PHOS: 79 U/L / ALT: 11 U/L DA / AST: 17 U/L / GGT: x           Creatinine Trend: 8.70<--, 13.30<--, 12.70<--  I&O's Summary    02 Dec 2023 07:01  -  02 Dec 2023 23:32  --------------------------------------------------------  IN: 0 mL / OUT: 10 mL / NET: -10 mL                MEDICATIONS:    MEDICATIONS  (STANDING):  amLODIPine   Tablet 10 milliGRAM(s) Oral daily  atorvastatin 40 milliGRAM(s) Oral at bedtime  budesonide 160 MICROgram(s)/formoterol 4.5 MICROgram(s) Inhaler 2 Puff(s) Inhalation two times a day  chlorhexidine 2% Cloths 1 Application(s) Topical <User Schedule>  epoetin beverley (PROCRIT) Injectable 85996 Unit(s) IV Push <User Schedule>  folic acid 1 milliGRAM(s) Oral daily  heparin   Injectable 5000 Unit(s) SubCutaneous every 12 hours  hydrALAZINE 25 milliGRAM(s) Oral three times a day  insulin lispro (ADMELOG) corrective regimen sliding scale   SubCutaneous Before meals and at bedtime  latanoprost 0.005% Ophthalmic Solution 1 Drop(s) Both EYES at bedtime  levothyroxine 25 MICROGram(s) Oral daily  LORazepam     Tablet 2 milliGRAM(s) Oral <User Schedule>  metoprolol tartrate 25 milliGRAM(s) Oral two times a day  pantoprazole  Injectable 40 milliGRAM(s) IV Push every 12 hours  sertraline 200 milliGRAM(s) Oral daily  sevelamer carbonate 800 milliGRAM(s) Oral three times a day with meals  sucralfate 1 Gram(s) Oral every 6 hours      MEDICATIONS  (PRN):  acetaminophen     Tablet .. 650 milliGRAM(s) Oral every 6 hours PRN Temp greater or equal to 38C (100.4F), Mild Pain (1 - 3)  albuterol    90 MICROgram(s) HFA Inhaler 2 Puff(s) Inhalation every 6 hours PRN Bronchospasm  melatonin 3 milliGRAM(s) Oral at bedtime PRN Insomnia  ondansetron Injectable 4 milliGRAM(s) IV Push every 8 hours PRN Nausea and/or Vomiting      REVIEW OF SYSTEMS:                           ALL ROS DONE [ X   ]    CONSTITUTIONAL:  LETHARGIC [   ], FEVER [   ], UNRESPONSIVE [   ]  CVS:  CP  [   ], SOB, [   ], PALPITATIONS [   ], DIZZYNESS [   ]  RS: COUGH [   ], SPUTUM [   ]  GI: ABDOMINAL PAIN [   ], NAUSEA [   ], VOMITINGS [   ], DIARRHEA [   ], CONSTIPATION [   ]  :  DYSURIA [   ], NOCTURIA [   ], INCREASED FREQUENCY [   ], DRIBLING [   ],  SKELETAL: PAINFUL JOINTS [   ], SWOLLEN JOINTS [   ], NECK ACHE [   ], LOW BACK ACHE [   ],  SKIN : ULCERS [   ], RASH [   ], ITCHING [   ]  CNS: HEAD ACHE [   ], DOUBLE VISION [   ], BLURRED VISION [   ], AMS / CONFUSION [   ], SEIZURES [   ], WEAKNESS [   ],TINGLING / NUMBNESS [   ]    PHYSICAL EXAMINATION:  GENERAL APPEARANCE: NO DISTRESS  HEENT:  NO PALLOR, NO  JVD,  NO   NODES, NECK SUPPLE  CVS: S1 +, S2 +,   RS: AEEB,  OCCASIONAL  RALES +,   NO RONCHI  ABD: SOFT, NT, NO, BS +  EXT: NO PE  SKIN: WARM,   SKELETAL:  LEFT BKA +  CNS:  AAO X 3    RADIOLOGY :      ASSESSMENT :     Anemia    No pertinent past medical history    Hypertension    Adrenal insufficiency    CKD (chronic kidney disease)    Anemia    Glaucoma    Coronary artery disease    HLD (hyperlipidemia)    Peripheral vascular disease    Spinal stenosis of lumbosacral region    Hyperparathyroidism    Diabetes mellitus    Diabetic neuropathy    Contracture of hand    Osteoarthritis    Osteoporosis    Vision loss of left eye    ESRD on hemodialysis    Cataract    BPH (benign prostatic hyperplasia)    UTI (urinary tract infection)    Bladder mass    H/O hematuria    Osteoporosis    Vision loss of right eye    Depression    Chronic GERD    Osteomyelitis of vertebra    CHF (congestive heart failure)    No significant past surgical history    Below knee amputation status, left    History of right cataract extraction    History of left cataract extraction    S/P arteriovenous (AV) fistula creation    H/O hematuria    H/O transurethral destruction of bladder lesion    History of excision of mass        PLAN:  HPI:  63M visually imparied with history of ESRD on dialysis T/TH/sat, last dialysis on 11/28  HTN, NIDDM, hypothyroidism, GERD, COPD, presents from [facility] with report of coffee-ground emesis X3 this AM. Patient uncooperative with rest of history constantly demands food, denies any other complaints.    Denies HA, CP, SOB, NVD    In the ED, Hgb 8, no emesis reported, patient tolerated trial of ice chips CT pending, 2mg ativan given by ED (30 Nov 2023 23:36)    # DIARRHEA  - F/U STOOL STUDIES  - MONITORING CLOSELY    # RECURRENT INTRACTABLE NAUSEA/VOMITING, ? COFFEE GROUND EMESIS  # GASTROPARESIS  # HISTORY OF ESOPHAGITIS AND DUODENITIS [1/2021], HX OF GASTROPARESIS W/ EVIDENCE OF THICKENED ESOPHAGUS ON PREVIOUS CT SCAN   # PANCREAS W/ DOUBLE DUCT SIGN    - MONITORING HGB, PLACED ON PPI BID , CARAFATE QID  - PRN ANTIEMETICS    - MONITORING FOR SYMPTOMS  - WHILE ON DIET PATIENT REPEATEDLY COUNSELLED TO CHEW FOOD CAUTIOUSLY AND CONSUME SMALL BITES W/ REGULAR CLEARING WITH WATER BETWEEN BITES    - ADVANCE DIET AS TOLERATED  - NOTED CT A/P    - GI CONSULT    # EPISODE OF HYPOGLYCEMIA   # GASTROPARESIS  # UNDERLYING DM  - SSI + FS    - PATIENT WAS RAPID RESPONSE DUE TO HYPOGLYCEMIA - IMPROVED S/P DEXTROSE PUSH, NOW MAINTAINING EUGLYCEMIA    # UNCONTROLLED HTN  # HYPERKALEMIA - IMPROVED  # ESRD ON HD TTS - W/ HX OF NONCOMPLIANCE    - TELEMETRY  - HYDRALAZINE, METOPROLOL AND NORVASC ; PRN IV ANTIHYPERTENSIVE  - LOKELMA  - SUPPLEMENTAL O2  - PLANNED FOR HD  - NEPHROLOGY CONSULT  - PULMONOLOGY CONSULT  - ID CONSULT    # DEPRESSION  - PLACED ON ZOLOFT    # PATIENT UNDERGOING OUTPATIENT WORKUP FOR CENTRAL VENOUS STENOSIS THROUGH NEPHROLOGY TEAM  - ? s/p STENT PLACEMENT    # ANEMIA OF CKD  # HX OF PANCYTOPENIA   - TREND HGB, TRANSFUSION THRESHOLD HGB < 7  - TYPE AND SCREEN  - ON EPO    - PPI BID, CARAFATE QID    # HX OF COPD  # PULMONARY HYPERTENSION    # SEVERE PROTEIN CALORIE MALNUTRITION, FAILURE TO THRIVE   -  TEMPORAL WASTING, LOSS OF MUSCLE MASS FROM SHOULDER AND HIP GIRDLE  - NUTRITIONAL SUPPLEMENT    # HLD  # PARTIALLY BLIND  # S/P LEFT BKA  # HX OF PVD  # LS SPINAL STENOSIS  # GI AND DVT PPX .   Patient is a 63y old  Male who presents with a chief complaint of Hematemesis (02 Dec 2023 17:24)    PATIENT IS SEEN AND EXAMINED IN MEDICAL FLOOR.      ALLERGIES:  No Known Drug Allergies  fish (Rash)  liver (Anaphylaxis)      VITALS:    Vital Signs Last 24 Hrs  T(C): 36.9 (02 Dec 2023 21:35), Max: 37.1 (02 Dec 2023 14:35)  T(F): 98.4 (02 Dec 2023 21:35), Max: 98.8 (02 Dec 2023 14:35)  HR: 82 (02 Dec 2023 21:35) (82 - 94)  BP: 126/71 (02 Dec 2023 21:35) (126/71 - 181/91)  BP(mean): --  RR: 17 (02 Dec 2023 21:35) (17 - 19)  SpO2: 95% (02 Dec 2023 21:35) (85% - 95%)    Parameters below as of 02 Dec 2023 21:35  Patient On (Oxygen Delivery Method): room air        LABS:    CBC Full  -  ( 02 Dec 2023 01:50 )  WBC Count : 2.86 K/uL  RBC Count : 2.91 M/uL  Hemoglobin : 8.3 g/dL  Hematocrit : 27.4 %  Platelet Count - Automated : 45 K/uL  Mean Cell Volume : 94.2 fl  Mean Cell Hemoglobin : 28.5 pg  Mean Cell Hemoglobin Concentration : 30.3 gm/dL  Auto Neutrophil # : 2.52 K/uL  Auto Lymphocyte # : 0.34 K/uL  Auto Monocyte # : 0.00 K/uL  Auto Eosinophil # : 0.00 K/uL  Auto Basophil # : 0.00 K/uL  Auto Neutrophil % : 88.0 %  Auto Lymphocyte % : 12.0 %  Auto Monocyte % : 0.0 %  Auto Eosinophil % : 0.0 %  Auto Basophil % : 0.0 %      12-02    136  |  99  |  24<H>  ----------------------------<  53<LL>  4.3   |  30  |  8.70<H>    Ca    8.4      02 Dec 2023 01:50  Phos  3.0     12-02  Mg     2.6     12-02    TPro  6.5  /  Alb  3.0<L>  /  TBili  0.6  /  DBili  x   /  AST  17  /  ALT  11  /  AlkPhos  79  12-02    CAPILLARY BLOOD GLUCOSE      POCT Blood Glucose.: 182 mg/dL (02 Dec 2023 21:24)  POCT Blood Glucose.: 134 mg/dL (02 Dec 2023 17:05)  POCT Blood Glucose.: 101 mg/dL (02 Dec 2023 11:54)  POCT Blood Glucose.: 100 mg/dL (02 Dec 2023 08:09)  POCT Blood Glucose.: 196 mg/dL (02 Dec 2023 02:15)  POCT Blood Glucose.: 62 mg/dL (02 Dec 2023 01:49)  POCT Blood Glucose.: 42 mg/dL (02 Dec 2023 01:09)  POCT Blood Glucose.: 138 mg/dL (01 Dec 2023 23:52)    CARDIAC MARKERS ( 02 Dec 2023 01:50 )  x     / x     / 257 U/L / x     / 5.4 ng/mL      LIVER FUNCTIONS - ( 02 Dec 2023 01:50 )  Alb: 3.0 g/dL / Pro: 6.5 g/dL / ALK PHOS: 79 U/L / ALT: 11 U/L DA / AST: 17 U/L / GGT: x           Creatinine Trend: 8.70<--, 13.30<--, 12.70<--  I&O's Summary    02 Dec 2023 07:01  -  02 Dec 2023 23:32  --------------------------------------------------------  IN: 0 mL / OUT: 10 mL / NET: -10 mL                MEDICATIONS:    MEDICATIONS  (STANDING):  amLODIPine   Tablet 10 milliGRAM(s) Oral daily  atorvastatin 40 milliGRAM(s) Oral at bedtime  budesonide 160 MICROgram(s)/formoterol 4.5 MICROgram(s) Inhaler 2 Puff(s) Inhalation two times a day  chlorhexidine 2% Cloths 1 Application(s) Topical <User Schedule>  epoetin beverley (PROCRIT) Injectable 85877 Unit(s) IV Push <User Schedule>  folic acid 1 milliGRAM(s) Oral daily  heparin   Injectable 5000 Unit(s) SubCutaneous every 12 hours  hydrALAZINE 25 milliGRAM(s) Oral three times a day  insulin lispro (ADMELOG) corrective regimen sliding scale   SubCutaneous Before meals and at bedtime  latanoprost 0.005% Ophthalmic Solution 1 Drop(s) Both EYES at bedtime  levothyroxine 25 MICROGram(s) Oral daily  LORazepam     Tablet 2 milliGRAM(s) Oral <User Schedule>  metoprolol tartrate 25 milliGRAM(s) Oral two times a day  pantoprazole  Injectable 40 milliGRAM(s) IV Push every 12 hours  sertraline 200 milliGRAM(s) Oral daily  sevelamer carbonate 800 milliGRAM(s) Oral three times a day with meals  sucralfate 1 Gram(s) Oral every 6 hours      MEDICATIONS  (PRN):  acetaminophen     Tablet .. 650 milliGRAM(s) Oral every 6 hours PRN Temp greater or equal to 38C (100.4F), Mild Pain (1 - 3)  albuterol    90 MICROgram(s) HFA Inhaler 2 Puff(s) Inhalation every 6 hours PRN Bronchospasm  melatonin 3 milliGRAM(s) Oral at bedtime PRN Insomnia  ondansetron Injectable 4 milliGRAM(s) IV Push every 8 hours PRN Nausea and/or Vomiting      REVIEW OF SYSTEMS:                           ALL ROS DONE [ X   ]    CONSTITUTIONAL:  LETHARGIC [   ], FEVER [   ], UNRESPONSIVE [   ]  CVS:  CP  [   ], SOB, [   ], PALPITATIONS [   ], DIZZYNESS [   ]  RS: COUGH [   ], SPUTUM [   ]  GI: ABDOMINAL PAIN [   ], NAUSEA [   ], VOMITINGS [   ], DIARRHEA [   ], CONSTIPATION [   ]  :  DYSURIA [   ], NOCTURIA [   ], INCREASED FREQUENCY [   ], DRIBLING [   ],  SKELETAL: PAINFUL JOINTS [   ], SWOLLEN JOINTS [   ], NECK ACHE [   ], LOW BACK ACHE [   ],  SKIN : ULCERS [   ], RASH [   ], ITCHING [   ]  CNS: HEAD ACHE [   ], DOUBLE VISION [   ], BLURRED VISION [   ], AMS / CONFUSION [   ], SEIZURES [   ], WEAKNESS [   ],TINGLING / NUMBNESS [   ]    PHYSICAL EXAMINATION:  GENERAL APPEARANCE: NO DISTRESS  HEENT:  NO PALLOR, NO  JVD,  NO   NODES, NECK SUPPLE  CVS: S1 +, S2 +,   RS: AEEB,  OCCASIONAL  RALES +,   NO RONCHI  ABD: SOFT, NT, NO, BS +  EXT: NO PE  SKIN: WARM,   SKELETAL:  LEFT BKA +  CNS:  AAO X 3    RADIOLOGY :      ASSESSMENT :     Anemia    No pertinent past medical history    Hypertension    Adrenal insufficiency    CKD (chronic kidney disease)    Anemia    Glaucoma    Coronary artery disease    HLD (hyperlipidemia)    Peripheral vascular disease    Spinal stenosis of lumbosacral region    Hyperparathyroidism    Diabetes mellitus    Diabetic neuropathy    Contracture of hand    Osteoarthritis    Osteoporosis    Vision loss of left eye    ESRD on hemodialysis    Cataract    BPH (benign prostatic hyperplasia)    UTI (urinary tract infection)    Bladder mass    H/O hematuria    Osteoporosis    Vision loss of right eye    Depression    Chronic GERD    Osteomyelitis of vertebra    CHF (congestive heart failure)    No significant past surgical history    Below knee amputation status, left    History of right cataract extraction    History of left cataract extraction    S/P arteriovenous (AV) fistula creation    H/O hematuria    H/O transurethral destruction of bladder lesion    History of excision of mass        PLAN:  HPI:  63M visually imparied with history of ESRD on dialysis T/TH/sat, last dialysis on 11/28  HTN, NIDDM, hypothyroidism, GERD, COPD, presents from [facility] with report of coffee-ground emesis X3 this AM. Patient uncooperative with rest of history constantly demands food, denies any other complaints.    Denies HA, CP, SOB, NVD    In the ED, Hgb 8, no emesis reported, patient tolerated trial of ice chips CT pending, 2mg ativan given by ED (30 Nov 2023 23:36)    # DIARRHEA  - F/U STOOL STUDIES  - MONITORING CLOSELY    # RECURRENT INTRACTABLE NAUSEA/VOMITING, ? COFFEE GROUND EMESIS  # GASTROPARESIS  # HISTORY OF ESOPHAGITIS AND DUODENITIS [1/2021], HX OF GASTROPARESIS W/ EVIDENCE OF THICKENED ESOPHAGUS ON PREVIOUS CT SCAN   # PANCREAS W/ DOUBLE DUCT SIGN    - MONITORING HGB, PLACED ON PPI BID , CARAFATE QID  - PRN ANTIEMETICS    - MONITORING FOR SYMPTOMS  - WHILE ON DIET PATIENT REPEATEDLY COUNSELLED TO CHEW FOOD CAUTIOUSLY AND CONSUME SMALL BITES W/ REGULAR CLEARING WITH WATER BETWEEN BITES    - ADVANCE DIET AS TOLERATED  - NOTED CT A/P    - GI CONSULT    # EPISODE OF HYPOGLYCEMIA   # GASTROPARESIS  # UNDERLYING DM  - SSI + FS    - PATIENT WAS RAPID RESPONSE DUE TO HYPOGLYCEMIA - IMPROVED S/P DEXTROSE PUSH, NOW MAINTAINING EUGLYCEMIA    # UNCONTROLLED HTN  # HYPERKALEMIA - IMPROVED  # ESRD ON HD TTS - W/ HX OF NONCOMPLIANCE    - TELEMETRY  - HYDRALAZINE, METOPROLOL AND NORVASC ; PRN IV ANTIHYPERTENSIVE  - LOKELMA  - SUPPLEMENTAL O2  - PLANNED FOR HD  - NEPHROLOGY CONSULT  - PULMONOLOGY CONSULT  - ID CONSULT    # DEPRESSION  - PLACED ON ZOLOFT    # PATIENT UNDERGOING OUTPATIENT WORKUP FOR CENTRAL VENOUS STENOSIS THROUGH NEPHROLOGY TEAM  - ? s/p STENT PLACEMENT    # ANEMIA OF CKD  # HX OF PANCYTOPENIA   - TREND HGB, TRANSFUSION THRESHOLD HGB < 7  - TYPE AND SCREEN  - ON EPO    - PPI BID, CARAFATE QID    # HX OF COPD  # PULMONARY HYPERTENSION    # SEVERE PROTEIN CALORIE MALNUTRITION, FAILURE TO THRIVE   -  TEMPORAL WASTING, LOSS OF MUSCLE MASS FROM SHOULDER AND HIP GIRDLE  - NUTRITIONAL SUPPLEMENT    # HLD  # PARTIALLY BLIND  # S/P LEFT BKA  # HX OF PVD  # LS SPINAL STENOSIS  # GI AND DVT PPX .   Patient is a 63y old  Male who presents with a chief complaint of Hematemesis (02 Dec 2023 17:24)    PATIENT IS SEEN AND EXAMINED IN MEDICAL FLOOR.      ALLERGIES:  No Known Drug Allergies  fish (Rash)  liver (Anaphylaxis)      VITALS:    Vital Signs Last 24 Hrs  T(C): 36.9 (02 Dec 2023 21:35), Max: 37.1 (02 Dec 2023 14:35)  T(F): 98.4 (02 Dec 2023 21:35), Max: 98.8 (02 Dec 2023 14:35)  HR: 82 (02 Dec 2023 21:35) (82 - 94)  BP: 126/71 (02 Dec 2023 21:35) (126/71 - 181/91)  BP(mean): --  RR: 17 (02 Dec 2023 21:35) (17 - 19)  SpO2: 95% (02 Dec 2023 21:35) (85% - 95%)    Parameters below as of 02 Dec 2023 21:35  Patient On (Oxygen Delivery Method): room air        LABS:    CBC Full  -  ( 02 Dec 2023 01:50 )  WBC Count : 2.86 K/uL  RBC Count : 2.91 M/uL  Hemoglobin : 8.3 g/dL  Hematocrit : 27.4 %  Platelet Count - Automated : 45 K/uL  Mean Cell Volume : 94.2 fl  Mean Cell Hemoglobin : 28.5 pg  Mean Cell Hemoglobin Concentration : 30.3 gm/dL  Auto Neutrophil # : 2.52 K/uL  Auto Lymphocyte # : 0.34 K/uL  Auto Monocyte # : 0.00 K/uL  Auto Eosinophil # : 0.00 K/uL  Auto Basophil # : 0.00 K/uL  Auto Neutrophil % : 88.0 %  Auto Lymphocyte % : 12.0 %  Auto Monocyte % : 0.0 %  Auto Eosinophil % : 0.0 %  Auto Basophil % : 0.0 %      12-02    136  |  99  |  24<H>  ----------------------------<  53<LL>  4.3   |  30  |  8.70<H>    Ca    8.4      02 Dec 2023 01:50  Phos  3.0     12-02  Mg     2.6     12-02    TPro  6.5  /  Alb  3.0<L>  /  TBili  0.6  /  DBili  x   /  AST  17  /  ALT  11  /  AlkPhos  79  12-02    CAPILLARY BLOOD GLUCOSE      POCT Blood Glucose.: 182 mg/dL (02 Dec 2023 21:24)  POCT Blood Glucose.: 134 mg/dL (02 Dec 2023 17:05)  POCT Blood Glucose.: 101 mg/dL (02 Dec 2023 11:54)  POCT Blood Glucose.: 100 mg/dL (02 Dec 2023 08:09)  POCT Blood Glucose.: 196 mg/dL (02 Dec 2023 02:15)  POCT Blood Glucose.: 62 mg/dL (02 Dec 2023 01:49)  POCT Blood Glucose.: 42 mg/dL (02 Dec 2023 01:09)  POCT Blood Glucose.: 138 mg/dL (01 Dec 2023 23:52)    CARDIAC MARKERS ( 02 Dec 2023 01:50 )  x     / x     / 257 U/L / x     / 5.4 ng/mL      LIVER FUNCTIONS - ( 02 Dec 2023 01:50 )  Alb: 3.0 g/dL / Pro: 6.5 g/dL / ALK PHOS: 79 U/L / ALT: 11 U/L DA / AST: 17 U/L / GGT: x           Creatinine Trend: 8.70<--, 13.30<--, 12.70<--  I&O's Summary    02 Dec 2023 07:01  -  02 Dec 2023 23:32  --------------------------------------------------------  IN: 0 mL / OUT: 10 mL / NET: -10 mL                MEDICATIONS:    MEDICATIONS  (STANDING):  amLODIPine   Tablet 10 milliGRAM(s) Oral daily  atorvastatin 40 milliGRAM(s) Oral at bedtime  budesonide 160 MICROgram(s)/formoterol 4.5 MICROgram(s) Inhaler 2 Puff(s) Inhalation two times a day  chlorhexidine 2% Cloths 1 Application(s) Topical <User Schedule>  epoetin beverley (PROCRIT) Injectable 63159 Unit(s) IV Push <User Schedule>  folic acid 1 milliGRAM(s) Oral daily  heparin   Injectable 5000 Unit(s) SubCutaneous every 12 hours  hydrALAZINE 25 milliGRAM(s) Oral three times a day  insulin lispro (ADMELOG) corrective regimen sliding scale   SubCutaneous Before meals and at bedtime  latanoprost 0.005% Ophthalmic Solution 1 Drop(s) Both EYES at bedtime  levothyroxine 25 MICROGram(s) Oral daily  LORazepam     Tablet 2 milliGRAM(s) Oral <User Schedule>  metoprolol tartrate 25 milliGRAM(s) Oral two times a day  pantoprazole  Injectable 40 milliGRAM(s) IV Push every 12 hours  sertraline 200 milliGRAM(s) Oral daily  sevelamer carbonate 800 milliGRAM(s) Oral three times a day with meals  sucralfate 1 Gram(s) Oral every 6 hours      MEDICATIONS  (PRN):  acetaminophen     Tablet .. 650 milliGRAM(s) Oral every 6 hours PRN Temp greater or equal to 38C (100.4F), Mild Pain (1 - 3)  albuterol    90 MICROgram(s) HFA Inhaler 2 Puff(s) Inhalation every 6 hours PRN Bronchospasm  melatonin 3 milliGRAM(s) Oral at bedtime PRN Insomnia  ondansetron Injectable 4 milliGRAM(s) IV Push every 8 hours PRN Nausea and/or Vomiting      REVIEW OF SYSTEMS:                           ALL ROS DONE [ X   ]    CONSTITUTIONAL:  LETHARGIC [   ], FEVER [   ], UNRESPONSIVE [   ]  CVS:  CP  [   ], SOB, [   ], PALPITATIONS [   ], DIZZYNESS [   ]  RS: COUGH [   ], SPUTUM [   ]  GI: ABDOMINAL PAIN [   ], NAUSEA [   ], VOMITINGS [   ], DIARRHEA [   ], CONSTIPATION [   ]  :  DYSURIA [   ], NOCTURIA [   ], INCREASED FREQUENCY [   ], DRIBLING [   ],  SKELETAL: PAINFUL JOINTS [   ], SWOLLEN JOINTS [   ], NECK ACHE [   ], LOW BACK ACHE [   ],  SKIN : ULCERS [   ], RASH [   ], ITCHING [   ]  CNS: HEAD ACHE [   ], DOUBLE VISION [   ], BLURRED VISION [   ], AMS / CONFUSION [   ], SEIZURES [   ], WEAKNESS [   ],TINGLING / NUMBNESS [   ]    PHYSICAL EXAMINATION:  GENERAL APPEARANCE: NO DISTRESS  HEENT:  NO PALLOR, NO  JVD,  NO   NODES, NECK SUPPLE  CVS: S1 +, S2 +,   RS: AEEB,  OCCASIONAL  RALES +,   NO RONCHI  ABD: SOFT, NT, NO, BS +  EXT: NO PE  SKIN: WARM,   SKELETAL:  LEFT BKA +  CNS:  AAO X 3    RADIOLOGY :      ASSESSMENT :     Anemia    No pertinent past medical history    Hypertension    Adrenal insufficiency    CKD (chronic kidney disease)    Anemia    Glaucoma    Coronary artery disease    HLD (hyperlipidemia)    Peripheral vascular disease    Spinal stenosis of lumbosacral region    Hyperparathyroidism    Diabetes mellitus    Diabetic neuropathy    Contracture of hand    Osteoarthritis    Osteoporosis    Vision loss of left eye    ESRD on hemodialysis    Cataract    BPH (benign prostatic hyperplasia)    UTI (urinary tract infection)    Bladder mass    H/O hematuria    Osteoporosis    Vision loss of right eye    Depression    Chronic GERD    Osteomyelitis of vertebra    CHF (congestive heart failure)    No significant past surgical history    Below knee amputation status, left    History of right cataract extraction    History of left cataract extraction    S/P arteriovenous (AV) fistula creation    H/O hematuria    H/O transurethral destruction of bladder lesion    History of excision of mass        PLAN:  HPI:  63M visually imparied with history of ESRD on dialysis T/TH/sat, last dialysis on 11/28  HTN, NIDDM, hypothyroidism, GERD, COPD, presents from [facility] with report of coffee-ground emesis X3 this AM. Patient uncooperative with rest of history constantly demands food, denies any other complaints.    Denies HA, CP, SOB, NVD    In the ED, Hgb 8, no emesis reported, patient tolerated trial of ice chips CT pending, 2mg ativan given by ED (30 Nov 2023 23:36)    # DIARRHEA  - F/U STOOL STUDIES  - MONITORING CLOSELY    # RECURRENT INTRACTABLE NAUSEA/VOMITING, ? COFFEE GROUND EMESIS  # GASTROPARESIS  # HISTORY OF ESOPHAGITIS AND DUODENITIS [1/2021], HX OF GASTROPARESIS W/ EVIDENCE OF THICKENED ESOPHAGUS ON PREVIOUS CT SCAN   # PANCREAS W/ DOUBLE DUCT SIGN    - MONITORING HGB, PLACED ON PPI BID , CARAFATE QID  - PRN ANTIEMETICS    - MONITORING FOR SYMPTOMS  - WHILE ON DIET PATIENT REPEATEDLY COUNSELLED TO CHEW FOOD CAUTIOUSLY AND CONSUME SMALL BITES W/ REGULAR CLEARING WITH WATER BETWEEN BITES    - ADVANCE DIET AS TOLERATED  - NOTED CT A/P    - GI CONSULT    # EPISODE OF HYPOGLYCEMIA   # GASTROPARESIS  # UNDERLYING DM  - SSI + FS    - PATIENT WAS RAPID RESPONSE DUE TO HYPOGLYCEMIA - IMPROVED S/P DEXTROSE PUSH, NOW MAINTAINING EUGLYCEMIA    # UNCONTROLLED HTN  # HYPERKALEMIA - IMPROVED  # ESRD ON HD TTS - W/ HX OF NONCOMPLIANCE    - TELEMETRY  - HYDRALAZINE, METOPROLOL AND NORVASC ; PRN IV ANTIHYPERTENSIVE  - LOKELMA  - SUPPLEMENTAL O2  - PLANNED FOR HD  - NEPHROLOGY CONSULT  - PULMONOLOGY CONSULT  - ID CONSULT    # DEPRESSION  - PLACED ON ZOLOFT    # PATIENT UNDERGOING OUTPATIENT WORKUP FOR CENTRAL VENOUS STENOSIS THROUGH NEPHROLOGY TEAM  - ? s/p STENT PLACEMENT    # ANEMIA OF CKD  # HX OF PANCYTOPENIA   - TREND HGB, TRANSFUSION THRESHOLD HGB < 7  - TYPE AND SCREEN  - ON EPO    - PPI BID, CARAFATE QID    # HX OF COPD  # PULMONARY HYPERTENSION    # SEVERE PROTEIN CALORIE MALNUTRITION, FAILURE TO THRIVE   -  TEMPORAL WASTING, LOSS OF MUSCLE MASS FROM [employer] [facility]  - NUTRITIONAL SUPPLEMENT    # HLD  # PARTIALLY BLIND  # S/P LEFT BKA  # HX OF PVD  # LS SPINAL STENOSIS  # GI AND DVT PPX .

## 2023-12-02 NOTE — PROGRESS NOTE ADULT - ASSESSMENT
62 year old male from [facility], walks with RW, with PMH of ESRD on HD (TTS), BKA- L w/prosthetic leg, DM, Left eye blindness, CHF, CAD, HTN, HLD, osteoporosis, bronchitis, current smoker, and anemia who presents with complaint of coffee ground emesis    # ESRD. on HD TTS was dialyzed yesterday. did not complete HD yesterday  HD in AM   D/W pt compliance with HD   # anemia of CKD and Gastrointestinal bleed. bleed. epogen on HD. Transfuse for HBG <7. Gastrointestinal bleeding   #CKDMBD. cont sevelamer  # HTN. blood pressure at goal  # PVD Left BKA, no active issues

## 2023-12-02 NOTE — RAPID RESPONSE TEAM SUMMARY - NSSITUATIONBACKGROUNDRRT_GEN_ALL_CORE
Patient was noted to be complaining of shortness of breath. Pulse oximetry was checked showing saturation 98%. RRT was later called for AMS, diaphoresis, agitation. RBG showed hypoglycemia 40s. There was no good IV access and patient was uncooperative. He was given Zyprexa 5 mg IM, which calmed him down. Glucagon 1 mg IM was given. Repeat blood glucose was 60s. A peripheral IV was placed. D50 was given. Labs were sent.

## 2023-12-02 NOTE — PROGRESS NOTE ADULT - SUBJECTIVE AND OBJECTIVE BOX
[facility] : Progress Note :: [phone], (office [phone]),   Dr Mosher / Dr Washington / Dr Young / Dr Doll / Dr Varsha TURCIOS / Dr Tello / Dr Garcia / Dr Larry qiu  _____________________________________________________________________________________________    Only stayed fro 2 hrs of HD yesterday.      No Known Drug Allergies  fish (Rash)  liver (Anaphylaxis)    Hospital Medications:   MEDICATIONS  (STANDING):  amLODIPine   Tablet 10 milliGRAM(s) Oral daily  atorvastatin 40 milliGRAM(s) Oral at bedtime  budesonide 160 MICROgram(s)/formoterol 4.5 MICROgram(s) Inhaler 2 Puff(s) Inhalation two times a day  chlorhexidine 2% Cloths 1 Application(s) Topical <User Schedule>  dextrose 5% + lactated ringers. 1000 milliLiter(s) (100 mL/Hr) IV Continuous <Continuous>  epoetin beverley (PROCRIT) Injectable 00266 Unit(s) IV Push <User Schedule>  folic acid 1 milliGRAM(s) Oral daily  heparin   Injectable 5000 Unit(s) SubCutaneous every 12 hours  hydrALAZINE 25 milliGRAM(s) Oral three times a day  insulin lispro (ADMELOG) corrective regimen sliding scale   SubCutaneous Before meals and at bedtime  latanoprost 0.005% Ophthalmic Solution 1 Drop(s) Both EYES at bedtime  levothyroxine 25 MICROGram(s) Oral daily  LORazepam     Tablet 2 milliGRAM(s) Oral <User Schedule>  metoprolol tartrate 25 milliGRAM(s) Oral two times a day  pantoprazole  Injectable 40 milliGRAM(s) IV Push every 12 hours  sertraline 200 milliGRAM(s) Oral daily  sevelamer carbonate 800 milliGRAM(s) Oral three times a day with meals  sucralfate 1 Gram(s) Oral every 6 hours      VITALS:  T(F): 98.8 (12-02-23 @ 14:35), Max: 98.8 (12-02-23 @ 14:35)  HR: 88 (12-02-23 @ 14:35)  BP: 162/74 (12-02-23 @ 14:35)  RR: 19 (12-02-23 @ 14:35)  SpO2: 91% (12-02-23 @ 14:35)  Wt(kg): --      PHYSICAL EXAM:  Constitutional: NAD  HEENT: anicteric sclera, oropharynx clear.  Neck: No JVD  Respiratory: CTAB, no wheezes, rales or rhonchi  Cardiovascular: S1, S2, RRR  Gastrointestinal: BS+, soft, NT/ND  Extremities: No peripheral edema  Neurological: A/O x 3, no focal deficits  Vascular Access: AVF with thrill and bruit     LABS:  12-02    136  |  99  |  24<H>  ----------------------------<  53<LL>  4.3   |  30  |  8.70<H>    Ca    8.4      02 Dec 2023 01:50  Phos  3.0     12-02  Mg     2.6     12-02    TPro  6.5  /  Alb  3.0<L>  /  TBili  0.6  /  DBili      /  AST  17  /  ALT  11  /  AlkPhos  79  12-02    Creatinine Trend: 8.70 <--, 13.30 <--, 12.70 <--                        8.3    2.86  )-----------( 45       ( 02 Dec 2023 01:50 )             27.4     Urine Studies:  Urinalysis Basic - ( 02 Dec 2023 01:50 )    Color:  / Appearance:  / SG:  / pH:   Gluc: 53 mg/dL / Ketone:   / Bili:  / Urobili:    Blood:  / Protein:  / Nitrite:    Leuk Esterase:  / RBC:  / WBC    Sq Epi:  / Non Sq Epi:  / Bacteria:         RADIOLOGY & ADDITIONAL STUDIES:

## 2023-12-02 NOTE — CHART NOTE - NSCHARTNOTEFT_GEN_A_CORE
Nurse reports that patient has had 7 loose nobloody stools today. Patient reports no pain or discomfort. Possible viral gastritis or bacterial infection. Ordered GI PCR, stool cultures, cdiff, Ova and parasite. Will continue to monitor electrolytes and fluid intake.

## 2023-12-03 NOTE — DISCHARGE NOTE PROVIDER - NSDCFUSCHEDAPPT_GEN_ALL_CORE_FT
[facility]  VASCULAR [address]  Scheduled Appointment: 02/12/2024    Ambrocio Mason  [facility]  VASCULAR [address]  Scheduled Appointment: 02/12/2024     [facility]  VASCULAR [address]  Scheduled Appointment: 02/12/2024    Ambrocio Mason  [facility]  VASCULAR [address]  Scheduled Appointment: 02/12/2024

## 2023-12-03 NOTE — PROGRESS NOTE ADULT - PROBLEM SELECTOR PLAN 4
No signs of fluid overload  Diuresis with Dialysis K stable, mental status stable, no urgent need for dialysis at this time. HD Schedule: Tue/Thur/Sat  Location: [place]. Last Session: 12/1 after admission.   Access: Left AVF on forearm.   Neprhology Dr Mosher Consulted  Social Work Consult for Reinstatement of HD  started epogen on HD and sevelamer.  CHG Ordered  f/u Hepatitis Panel (pt refused labs) K stable, mental status stable, no urgent need for dialysis at this time. HD Schedule: Tue/Thur/Sat  Location: [place]. Last Session: 12/1 after admission.   Access: Left AVF on forearm.   Neprhology Dr Mosher Consulted  Social Work Consult for Reinstatement of HD  started epogen on HD and sevelamer.  CHG Ordered  f/u Hepatitis Panel (pt refused labs) K stable, mental status stable, no urgent need for dialysis at this time. HD Schedule: Tue/Thur/Sat  Location: [place]. Last Session: 12/1 after admission.   Access: Left AVF on forearm.   Neprhology Dr Mosher Consulted  Social Work Consult for Reinstatement of HD  started epogen on HD and sevelamer.  CHG Ordered  f/u Hepatitis Panel (pt refused labs) K stable, mental status stable, no urgent need for dialysis at this time. HD Schedule: Tue/Thur/Sat  Location: [place]. Last Session: 12/1 after admission.   Access: Left AVF on forearm.   Neprhology Dr Mosher following (follow up with  about next HD)  Social Work Consult for Reinstatement of HD  started epogen on HD and sevelamer.  CHG Ordered  Hepatitis Panel: NR K stable, mental status stable, no urgent need for dialysis at this time. HD Schedule: Tue/Thur/Sat  Location: [place]. Last Session: 12/1 after admission.   Access: Left AVF on forearm.   Neprhology Dr Mosher following (follow up with  about next HD)  Social Work Consult for Reinstatement of HD  started epogen on HD and sevelamer.  CHG Ordered  Hepatitis Panel: NR K stable, mental status stable, no urgent need for dialysis at this time. HD Schedule: Tue/Thur/Sat  Location: [place]. Last Session: 12/1 after admission.   Access: Left AVF on forearm.   Neprhology Dr Mosher following (follow up with  about next HD)  Social Work Consult for Reinstatement of HD  started epogen on HD and sevelamer.  CHG Ordered  Hepatitis Panel: NR K stable, mental status stable, no urgent need for dialysis at this time. HD Schedule: Tue/Thur/Sat  Location: [place]. Last Session: 12/1 after admission.   Access: Left AVF on forearm.   next HD 12/4  Social Work Consult for Reinstatement of HD  started epogen on HD and sevelamer.  CHG Ordered  Hepatitis Panel: NR K stable, mental status stable, no urgent need for dialysis at this time. HD Schedule: Tue/Thur/Sat  Location: [place]. Last Session: 12/1 after admission.   Access: Left AVF on forearm.   next HD 12/4  Social Work Consult for Reinstatement of HD  started epogen on HD and sevelamer.  CHG Ordered  Hepatitis Panel: NR K stable, mental status stable, no urgent need for dialysis at this time. HD Schedule: Tue/Thur/Sat  Location: [place]. Last Session: 12/1 after admission.   Access: Left AVF on forearm.   next HD 12/4  Social Work Consult for Reinstatement of HD  started epogen on HD and sevelamer.  CHG Ordered  Hepatitis Panel: NR

## 2023-12-03 NOTE — DIETITIAN INITIAL EVALUATION ADULT - PERTINENT MEDS FT
MEDICATIONS  (STANDING):  amLODIPine   Tablet 10 milliGRAM(s) Oral daily  atorvastatin 40 milliGRAM(s) Oral at bedtime  budesonide 160 MICROgram(s)/formoterol 4.5 MICROgram(s) Inhaler 2 Puff(s) Inhalation two times a day  chlorhexidine 2% Cloths 1 Application(s) Topical <User Schedule>  epoetin beverley (PROCRIT) Injectable 45610 Unit(s) IV Push <User Schedule>  folic acid 1 milliGRAM(s) Oral daily  heparin   Injectable 5000 Unit(s) SubCutaneous every 12 hours  hydrALAZINE 25 milliGRAM(s) Oral three times a day  latanoprost 0.005% Ophthalmic Solution 1 Drop(s) Both EYES at bedtime  levothyroxine 25 MICROGram(s) Oral daily  LORazepam     Tablet 2 milliGRAM(s) Oral <User Schedule>  metoprolol tartrate 25 milliGRAM(s) Oral two times a day  metroNIDAZOLE    Tablet 500 milliGRAM(s) Oral every 8 hours  pantoprazole  Injectable 40 milliGRAM(s) IV Push every 12 hours  sertraline 200 milliGRAM(s) Oral daily  sevelamer carbonate 800 milliGRAM(s) Oral three times a day with meals  sucralfate 1 Gram(s) Oral every 6 hours    MEDICATIONS  (PRN):  acetaminophen     Tablet .. 650 milliGRAM(s) Oral every 6 hours PRN Temp greater or equal to 38C (100.4F), Mild Pain (1 - 3)  albuterol    90 MICROgram(s) HFA Inhaler 2 Puff(s) Inhalation every 6 hours PRN Bronchospasm  melatonin 3 milliGRAM(s) Oral at bedtime PRN Insomnia  ondansetron Injectable 4 milliGRAM(s) IV Push every 8 hours PRN Nausea and/or Vomiting   MEDICATIONS  (STANDING):  amLODIPine   Tablet 10 milliGRAM(s) Oral daily  atorvastatin 40 milliGRAM(s) Oral at bedtime  budesonide 160 MICROgram(s)/formoterol 4.5 MICROgram(s) Inhaler 2 Puff(s) Inhalation two times a day  chlorhexidine 2% Cloths 1 Application(s) Topical <User Schedule>  epoetin beverley (PROCRIT) Injectable 18668 Unit(s) IV Push <User Schedule>  folic acid 1 milliGRAM(s) Oral daily  heparin   Injectable 5000 Unit(s) SubCutaneous every 12 hours  hydrALAZINE 25 milliGRAM(s) Oral three times a day  latanoprost 0.005% Ophthalmic Solution 1 Drop(s) Both EYES at bedtime  levothyroxine 25 MICROGram(s) Oral daily  LORazepam     Tablet 2 milliGRAM(s) Oral <User Schedule>  metoprolol tartrate 25 milliGRAM(s) Oral two times a day  metroNIDAZOLE    Tablet 500 milliGRAM(s) Oral every 8 hours  pantoprazole  Injectable 40 milliGRAM(s) IV Push every 12 hours  sertraline 200 milliGRAM(s) Oral daily  sevelamer carbonate 800 milliGRAM(s) Oral three times a day with meals  sucralfate 1 Gram(s) Oral every 6 hours    MEDICATIONS  (PRN):  acetaminophen     Tablet .. 650 milliGRAM(s) Oral every 6 hours PRN Temp greater or equal to 38C (100.4F), Mild Pain (1 - 3)  albuterol    90 MICROgram(s) HFA Inhaler 2 Puff(s) Inhalation every 6 hours PRN Bronchospasm  melatonin 3 milliGRAM(s) Oral at bedtime PRN Insomnia  ondansetron Injectable 4 milliGRAM(s) IV Push every 8 hours PRN Nausea and/or Vomiting

## 2023-12-03 NOTE — PROGRESS NOTE ADULT - ATTENDING COMMENTS
HPI:  63M visually imparied with history of ESRD on dialysis T/TH/sat, last dialysis on 11/28  HTN, NIDDM, hypothyroidism, GERD, COPD, presents from [facility] with report of coffee-ground emesis X3 this AM. Patient uncooperative with rest of history constantly demands food, denies any other complaints.    Denies HA, CP, SOB, NVD    In the ED, Hgb 8, no emesis reported, patient tolerated trial of ice chips CT pending, 2mg ativan given by ED (30 Nov 2023 23:36)    # DIARRHEA  - F/U STOOL STUDIES  - MONITORING CLOSELY    # RECURRENT INTRACTABLE NAUSEA/VOMITING, ? COFFEE GROUND EMESIS  # GASTROPARESIS  # HISTORY OF ESOPHAGITIS AND DUODENITIS [1/2021], HX OF GASTROPARESIS W/ EVIDENCE OF THICKENED ESOPHAGUS ON PREVIOUS CT SCAN   # PANCREAS W/ DOUBLE DUCT SIGN    - MONITORING HGB, PLACED ON PPI BID , CARAFATE QID  - PRN ANTIEMETICS    - MONITORING FOR SYMPTOMS  - WHILE ON DIET PATIENT REPEATEDLY COUNSELLED TO CHEW FOOD CAUTIOUSLY AND CONSUME SMALL BITES W/ REGULAR CLEARING WITH WATER BETWEEN BITES    - ADVANCE DIET AS TOLERATED  - NOTED CT A/P    - GI CONSULT    # EPISODE OF HYPOGLYCEMIA   # GASTROPARESIS  # UNDERLYING DM  - SSI + FS    - PATIENT WAS RAPID RESPONSE DUE TO HYPOGLYCEMIA - IMPROVED S/P DEXTROSE PUSH, NOW MAINTAINING EUGLYCEMIA    # UNCONTROLLED HTN  # HYPERKALEMIA - IMPROVED  # ESRD ON HD TTS - W/ HX OF NONCOMPLIANCE    - TELEMETRY  - HYDRALAZINE, METOPROLOL AND NORVASC ; PRN IV ANTIHYPERTENSIVE  - LOKELMA  - SUPPLEMENTAL O2  - PLANNED FOR HD  - NEPHROLOGY CONSULT  - PULMONOLOGY CONSULT  - ID CONSULT    # DEPRESSION  - PLACED ON ZOLOFT    # PATIENT UNDERGOING OUTPATIENT WORKUP FOR CENTRAL VENOUS STENOSIS THROUGH NEPHROLOGY TEAM  - ? s/p STENT PLACEMENT    # ANEMIA OF CKD  # HX OF PANCYTOPENIA   - TREND HGB, TRANSFUSION THRESHOLD HGB < 7  - TYPE AND SCREEN  - ON EPO    - PPI BID, CARAFATE QID    # HX OF COPD  # PULMONARY HYPERTENSION    # SEVERE PROTEIN CALORIE MALNUTRITION, FAILURE TO THRIVE   -  TEMPORAL WASTING, LOSS OF MUSCLE MASS FROM SHOULDER AND HIP GIRDLE  - NUTRITIONAL SUPPLEMENT    # HLD  # PARTIALLY BLIND  # S/P LEFT BKA  # HX OF PVD  # LS SPINAL STENOSIS  # GI AND DVT PPX HPI:  63M visually imparied with history of ESRD on dialysis T/TH/sat, last dialysis on 11/28  HTN, NIDDM, hypothyroidism, GERD, COPD, presents from [facility] with report of coffee-ground emesis X3 this AM. Patient uncooperative with rest of history constantly demands food, denies any other complaints.    Denies HA, CP, SOB, NVD    In the ED, Hgb 8, no emesis reported, patient tolerated trial of ice chips CT pending, 2mg ativan given by ED (30 Nov 2023 23:36)    # DIARRHEA  - F/U STOOL STUDIES  - MONITORING CLOSELY    # RECURRENT INTRACTABLE NAUSEA/VOMITING, ? COFFEE GROUND EMESIS  # GASTROPARESIS  # HISTORY OF ESOPHAGITIS AND DUODENITIS [1/2021], HX OF GASTROPARESIS W/ EVIDENCE OF THICKENED ESOPHAGUS ON PREVIOUS CT SCAN   # PANCREAS W/ DOUBLE DUCT SIGN    - MONITORING HGB, PLACED ON PPI BID , CARAFATE QID  - PRN ANTIEMETICS    - MONITORING FOR SYMPTOMS  - WHILE ON DIET PATIENT REPEATEDLY COUNSELLED TO CHEW FOOD CAUTIOUSLY AND CONSUME SMALL BITES W/ REGULAR CLEARING WITH WATER BETWEEN BITES    - ADVANCE DIET AS TOLERATED  - NOTED CT A/P    - GI CONSULT    # EPISODE OF HYPOGLYCEMIA   # GASTROPARESIS  # UNDERLYING DM  - SSI + FS    - PATIENT WAS RAPID RESPONSE DUE TO HYPOGLYCEMIA - IMPROVED S/P DEXTROSE PUSH, NOW MAINTAINING EUGLYCEMIA    # UNCONTROLLED HTN  # HYPERKALEMIA - IMPROVED  # ESRD ON HD TTS - W/ HX OF NONCOMPLIANCE    - TELEMETRY  - HYDRALAZINE, METOPROLOL AND NORVASC ; PRN IV ANTIHYPERTENSIVE  - LOKELMA  - SUPPLEMENTAL O2  - PLANNED FOR HD  - NEPHROLOGY CONSULT  - PULMONOLOGY CONSULT  - ID CONSULT    # DEPRESSION  - PLACED ON ZOLOFT    # PATIENT UNDERGOING OUTPATIENT WORKUP FOR CENTRAL VENOUS STENOSIS THROUGH NEPHROLOGY TEAM  - ? s/p STENT PLACEMENT    # ANEMIA OF CKD  # HX OF PANCYTOPENIA   - TREND HGB, TRANSFUSION THRESHOLD HGB < 7  - TYPE AND SCREEN  - ON EPO    - PPI BID, CARAFATE QID    # HX OF COPD  # PULMONARY HYPERTENSION    # SEVERE PROTEIN CALORIE MALNUTRITION, FAILURE TO THRIVE   -  TEMPORAL WASTING, LOSS OF MUSCLE MASS FROM SHOULDER AND HIP GIRDLE  - NUTRITIONAL SUPPLEMENT    # HLD  # PARTIALLY BLIND  # S/P LEFT BKA  # HX OF PVD  # LS SPINAL STENOSIS  # GI AND DVT PPX HPI:  63M visually imparied with history of ESRD on dialysis T/TH/sat, last dialysis on 11/28  HTN, NIDDM, hypothyroidism, GERD, COPD, presents from [facility] with report of coffee-ground emesis X3 this AM. Patient uncooperative with rest of history constantly demands food, denies any other complaints.    Denies HA, CP, SOB, NVD    In the ED, Hgb 8, no emesis reported, patient tolerated trial of ice chips CT pending, 2mg ativan given by ED (30 Nov 2023 23:36)    # DIARRHEA  - F/U STOOL STUDIES  - MONITORING CLOSELY    # RECURRENT INTRACTABLE NAUSEA/VOMITING, ? COFFEE GROUND EMESIS  # GASTROPARESIS  # HISTORY OF ESOPHAGITIS AND DUODENITIS [1/2021], HX OF GASTROPARESIS W/ EVIDENCE OF THICKENED ESOPHAGUS ON PREVIOUS CT SCAN   # PANCREAS W/ DOUBLE DUCT SIGN    - MONITORING HGB, PLACED ON PPI BID , CARAFATE QID  - PRN ANTIEMETICS    - MONITORING FOR SYMPTOMS  - WHILE ON DIET PATIENT REPEATEDLY COUNSELLED TO CHEW FOOD CAUTIOUSLY AND CONSUME SMALL BITES W/ REGULAR CLEARING WITH WATER BETWEEN BITES    - ADVANCE DIET AS TOLERATED  - NOTED CT A/P    - GI CONSULT    # EPISODE OF HYPOGLYCEMIA   # GASTROPARESIS  # UNDERLYING DM  - SSI + FS    - PATIENT WAS RAPID RESPONSE DUE TO HYPOGLYCEMIA - IMPROVED S/P DEXTROSE PUSH, NOW MAINTAINING EUGLYCEMIA    # UNCONTROLLED HTN  # HYPERKALEMIA - IMPROVED  # ESRD ON HD TTS - W/ HX OF NONCOMPLIANCE    - TELEMETRY  - HYDRALAZINE, METOPROLOL AND NORVASC ; PRN IV ANTIHYPERTENSIVE  - LOKELMA  - SUPPLEMENTAL O2  - PLANNED FOR HD  - NEPHROLOGY CONSULT  - PULMONOLOGY CONSULT  - ID CONSULT    # DEPRESSION  - PLACED ON ZOLOFT    # PATIENT UNDERGOING OUTPATIENT WORKUP FOR CENTRAL VENOUS STENOSIS THROUGH NEPHROLOGY TEAM  - ? s/p STENT PLACEMENT    # ANEMIA OF CKD  # HX OF PANCYTOPENIA   - TREND HGB, TRANSFUSION THRESHOLD HGB < 7  - TYPE AND SCREEN  - ON EPO    - PPI BID, CARAFATE QID    # HX OF COPD  # PULMONARY HYPERTENSION    # SEVERE PROTEIN CALORIE MALNUTRITION, FAILURE TO THRIVE   -  TEMPORAL WASTING, LOSS OF MUSCLE MASS FROM SHOULDER AND HIP GIRDLE  - NUTRITIONAL SUPPLEMENT    # HLD  # PARTIALLY BLIND  # S/P LEFT BKA  # HX OF PVD  # LS SPINAL STENOSIS  # GI AND DVT PPX HPI:  63M visually imparied with history of ESRD on dialysis T/TH/sat, last dialysis on 11/28  HTN, NIDDM, hypothyroidism, GERD, COPD, presents from [facility] with report of coffee-ground emesis X3 this AM. Patient uncooperative with rest of history constantly demands food, denies any other complaints.    Denies HA, CP, SOB, NVD    In the ED, Hgb 8, no emesis reported, patient tolerated trial of ice chips CT pending, 2mg ativan given by ED (30 Nov 2023 23:36)    # DIARRHEA  - F/U STOOL STUDIES  - CIPROFLOXACIN + FLAGYLL, F/U BCX   - MONITORING CLOSELY    # ELEVATED LACTIC ACID  - TREND    # RECURRENT INTRACTABLE NAUSEA/VOMITING, ? COFFEE GROUND EMESIS - IMPROVED  # GASTROPARESIS  # HISTORY OF ESOPHAGITIS AND DUODENITIS [1/2021], HX OF GASTROPARESIS W/ EVIDENCE OF THICKENED ESOPHAGUS ON PREVIOUS CT SCAN   # PANCREAS W/ DOUBLE DUCT SIGN    - MONITORING HGB, PLACED ON PPI BID , CARAFATE QID  - PRN ANTIEMETICS    - MONITORING FOR SYMPTOMS  - WHILE ON DIET PATIENT REPEATEDLY COUNSELLED TO CHEW FOOD CAUTIOUSLY AND CONSUME SMALL BITES W/ REGULAR CLEARING WITH WATER BETWEEN BITES    - ADVANCE DIET AS TOLERATED  - NOTED CT A/P    - GI CONSULT    # EPISODE OF HYPOGLYCEMIA   # GASTROPARESIS  # UNDERLYING DM  - SSI + FS    - PATIENT WAS RAPID RESPONSE DUE TO HYPOGLYCEMIA - IMPROVED S/P DEXTROSE PUSH, NOW MAINTAINING EUGLYCEMIA    # UNCONTROLLED HTN  # HYPERKALEMIA - IMPROVED  # ESRD ON HD TTS - W/ HX OF NONCOMPLIANCE    - TELEMETRY  - HYDRALAZINE, METOPROLOL AND NORVASC ; PRN IV ANTIHYPERTENSIVE  - LOKELMA  - SUPPLEMENTAL O2  - PLANNED FOR HD  - NEPHROLOGY CONSULT  - PULMONOLOGY CONSULT  - ID CONSULT    # DEPRESSION  - PLACED ON ZOLOFT    # PATIENT UNDERGOING OUTPATIENT WORKUP FOR CENTRAL VENOUS STENOSIS THROUGH NEPHROLOGY TEAM  - ? s/p STENT PLACEMENT    # ANEMIA OF CKD  # HX OF PANCYTOPENIA   - TREND HGB, TRANSFUSION THRESHOLD HGB < 7  - TYPE AND SCREEN  - ON EPO    - PPI BID, CARAFATE QID    # HX OF COPD  # PULMONARY HYPERTENSION    # SEVERE PROTEIN CALORIE MALNUTRITION, FAILURE TO THRIVE   -  TEMPORAL WASTING, LOSS OF MUSCLE MASS FROM SHOULDER AND HIP GIRDLE  - NUTRITIONAL SUPPLEMENT    # HLD  # PARTIALLY BLIND  # S/P LEFT BKA  # HX OF PVD  # LS SPINAL STENOSIS  # GI AND DVT PPX HPI:  63M visually imparied with history of ESRD on dialysis T/TH/sat, last dialysis on 11/28  HTN, NIDDM, hypothyroidism, GERD, COPD, presents from [facility] with report of coffee-ground emesis X3 this AM. Patient uncooperative with rest of history constantly demands food, denies any other complaints.    Denies HA, CP, SOB, NVD    In the ED, Hgb 8, no emesis reported, patient tolerated trial of ice chips CT pending, 2mg ativan given by ED (30 Nov 2023 23:36)    # DIARRHEA  - F/U STOOL STUDIES  - CIPROFLOXACIN + FLAGYLL, F/U BCX   - MONITORING CLOSELY    # ELEVATED LACTIC ACID  - TREND    # RECURRENT INTRACTABLE NAUSEA/VOMITING, ? COFFEE GROUND EMESIS - IMPROVED  # GASTROPARESIS  # HISTORY OF ESOPHAGITIS AND DUODENITIS [1/2021], HX OF GASTROPARESIS W/ EVIDENCE OF THICKENED ESOPHAGUS ON PREVIOUS CT SCAN   # PANCREAS W/ DOUBLE DUCT SIGN    - MONITORING HGB, PLACED ON PPI BID , CARAFATE QID  - PRN ANTIEMETICS    - MONITORING FOR SYMPTOMS  - WHILE ON DIET PATIENT REPEATEDLY COUNSELLED TO CHEW FOOD CAUTIOUSLY AND CONSUME SMALL BITES W/ REGULAR CLEARING WITH WATER BETWEEN BITES    - ADVANCE DIET AS TOLERATED  - NOTED CT A/P    - GI CONSULT    # EPISODE OF HYPOGLYCEMIA   # GASTROPARESIS  # UNDERLYING DM  - SSI + FS    - PATIENT WAS RAPID RESPONSE DUE TO HYPOGLYCEMIA - IMPROVED S/P DEXTROSE PUSH, NOW MAINTAINING EUGLYCEMIA    # UNCONTROLLED HTN  # HYPERKALEMIA - IMPROVED  # ESRD ON HD TTS - W/ HX OF NONCOMPLIANCE    - TELEMETRY  - HYDRALAZINE, METOPROLOL AND NORVASC ; PRN IV ANTIHYPERTENSIVE  - LOKELMA  - SUPPLEMENTAL O2  - PLANNED FOR HD  - NEPHROLOGY CONSULT  - PULMONOLOGY CONSULT  - ID CONSULT    # DEPRESSION  - PLACED ON ZOLOFT    # PATIENT UNDERGOING OUTPATIENT WORKUP FOR CENTRAL VENOUS STENOSIS THROUGH NEPHROLOGY TEAM  - ? s/p STENT PLACEMENT    # ANEMIA OF CKD  # HX OF PANCYTOPENIA   - TREND HGB, TRANSFUSION THRESHOLD HGB < 7  - TYPE AND SCREEN  - ON EPO    - PPI BID, CARAFATE QID    # HX OF COPD  # PULMONARY HYPERTENSION    # SEVERE PROTEIN CALORIE MALNUTRITION, FAILURE TO THRIVE   -  TEMPORAL WASTING, LOSS OF MUSCLE MASS FROM SHOULDER AND HIP GIRDLE  - NUTRITIONAL SUPPLEMENT    # HLD  # PARTIALLY BLIND  # S/P LEFT BKA  # HX OF PVD  # LS SPINAL STENOSIS  # GI AND DVT PPX HPI:  63M visually imparied with history of ESRD on dialysis T/TH/sat, last dialysis on 11/28  HTN, NIDDM, hypothyroidism, GERD, COPD, presents from [facility] with report of coffee-ground emesis X3 this AM. Patient uncooperative with rest of history constantly demands food, denies any other complaints.    Denies HA, CP, SOB, NVD    In the ED, Hgb 8, no emesis reported, patient tolerated trial of ice chips CT pending, 2mg ativan given by ED (30 Nov 2023 23:36)    # DIARRHEA  - F/U STOOL STUDIES  - CIPROFLOXACIN + FLAGYLL, F/U BCX   - MONITORING CLOSELY    # ELEVATED LACTIC ACID  - TREND    # RECURRENT INTRACTABLE NAUSEA/VOMITING, ? COFFEE GROUND EMESIS - IMPROVED  # GASTROPARESIS  # HISTORY OF ESOPHAGITIS AND DUODENITIS [1/2021], HX OF GASTROPARESIS W/ EVIDENCE OF THICKENED ESOPHAGUS ON PREVIOUS CT SCAN   # PANCREAS W/ DOUBLE DUCT SIGN    - MONITORING HGB, PLACED ON PPI BID , CARAFATE QID  - PRN ANTIEMETICS    - MONITORING FOR SYMPTOMS  - WHILE ON DIET PATIENT REPEATEDLY COUNSELLED TO CHEW FOOD CAUTIOUSLY AND CONSUME SMALL BITES W/ REGULAR CLEARING WITH WATER BETWEEN BITES    - ADVANCE DIET AS TOLERATED  - NOTED CT A/P    - GI CONSULT    # EPISODE OF HYPOGLYCEMIA   # GASTROPARESIS  # UNDERLYING DM  - SSI + FS    - PATIENT WAS RAPID RESPONSE DUE TO HYPOGLYCEMIA - IMPROVED S/P DEXTROSE PUSH, NOW MAINTAINING EUGLYCEMIA    # UNCONTROLLED HTN  # HYPERKALEMIA - IMPROVED  # ESRD ON HD TTS - W/ HX OF NONCOMPLIANCE    - TELEMETRY  - HYDRALAZINE, METOPROLOL AND NORVASC ; PRN IV ANTIHYPERTENSIVE  - LOKELMA  - SUPPLEMENTAL O2  - PLANNED FOR HD  - NEPHROLOGY CONSULT  - PULMONOLOGY CONSULT  - ID CONSULT    # DEPRESSION  - PLACED ON ZOLOFT    # PATIENT UNDERGOING OUTPATIENT WORKUP FOR CENTRAL VENOUS STENOSIS THROUGH NEPHROLOGY TEAM  - ? s/p STENT PLACEMENT    # ANEMIA OF CKD  # HX OF PANCYTOPENIA   - TREND HGB, TRANSFUSION THRESHOLD HGB < 7  - TYPE AND SCREEN  - ON EPO    - PPI BID, CARAFATE QID    # HX OF COPD  # PULMONARY HYPERTENSION    # SEVERE PROTEIN CALORIE MALNUTRITION, FAILURE TO THRIVE   -  TEMPORAL WASTING, LOSS OF MUSCLE MASS FROM SHOULDER AND HIP GIRDLE  - NUTRITIONAL SUPPLEMENT    # HLD  # PARTIALLY BLIND  # S/P LEFT BKA  # HX OF PVD  # LS SPINAL STENOSIS  # GI AND DVT PPX

## 2023-12-03 NOTE — DIETITIAN INITIAL EVALUATION ADULT - PERTINENT LABORATORY DATA
12-02    136  |  99  |  24<H>  ----------------------------<  53<LL>  4.3   |  30  |  8.70<H>    Ca    8.4      02 Dec 2023 01:50  Phos  3.0     12-02  Mg     2.6     12-02    TPro  6.5  /  Alb  3.0<L>  /  TBili  0.6  /  DBili  x   /  AST  17  /  ALT  11  /  AlkPhos  79  12-02  POCT Blood Glucose.: 151 mg/dL (12-03-23 @ 11:36)  A1C with Estimated Average Glucose Result: 6.1 % (07-10-23 @ 15:25)  A1C with Estimated Average Glucose Result: 5.7 % (03-16-23 @ 10:30)  A1C with Estimated Average Glucose Result: 5.0 % (01-10-23 @ 05:35)

## 2023-12-03 NOTE — DISCHARGE NOTE PROVIDER - HOSPITAL COURSE
Patient is a 63 year old male, with PMHx ESRD ([place], T/Th/Sat), HTN, DM, Hypothyroidism, GERD, COPD. Patient is visually impaired, and from [facility]. Patient was admitted for reported 3 episodes of coffee ground emesis. He was kept NPO, started on IV PPI. Patient was followed by GI however patient did not have further emesis episodes CT A/P dilated main pancreatic duct. EGD to be done outpatient. Patient's HD was started while in patient. Patient's course was complicated by multiple episodes of diarrhea, work up was done and revealed XXXXXXXX. Patient was started on PO cipro and flagyl empirically. Patient is a 63 year old male, with PMHx ESRD ([place], T/Th/Sat), HTN, DM, Hypothyroidism, GERD, COPD. Patient is visually impaired, and from [facility]. Patient was admitted for reported 3 episodes of coffee ground emesis. He was kept NPO, started on IV PPI. Patient was followed by GI however patient did not have further emesis episodes CT A/P dilated main pancreatic duct. EGD to be done outpatient. Patient's HD was started while in patient. Patient's course was complicated by multiple episodes of diarrhea, work up was done and revealed XXXXXXXX. Patient was started on PO cipro and flagyl empirically. 63 year old male from [facility], with PMHx of ESRD ([place], T/Th/Sat), HTN, DM, Hypothyroidism, GERD, COPD. Patient is visually impaired. Patient was admitted for reported 3 episodes of coffee ground emesis. He was kept NPO, started on IV PPI. Patient was followed by GI however patient did not have further emesis episodes CT A/P dilated main pancreatic duct. EGD to be done outpatient. Patient's course was complicated by multiple episodes of diarrhea, c.diff/GI PCR negative. Patient was started on PO cipro and flagyl empirically. Last HD today 12/6    Pt is medically optimized for discharge, discussed with the attending Dr De La Torre  This is just a brief hospital course, please refer to the progress notes for detailed information     63 year old male from [facility], with PMHx of ESRD ([place], T/Th/Sat), HTN, DM, Hypothyroidism, GERD, COPD. Patient is visually impaired. Patient was admitted for reported 3 episodes of coffee ground emesis. He was kept NPO, started on IV PPI. Patient was followed by GI however patient did not have further emesis episodes CT A/P dilated main pancreatic duct. EGD to be done outpatient. Patient's course was complicated by multiple episodes of diarrhea, c.diff/GI PCR negative. Patient was started on PO cipro and flagyl empirically. Last HD today 12/6    Pt is medically optimized for discharge, discussed with the attending Dr De La Torre  This is just a brief hospital course, please refer to the progress notes for detailed information

## 2023-12-03 NOTE — DIETITIAN INITIAL EVALUATION ADULT - OTHER INFO
Height from previous nutrition note: 5'2", (8/2/23). ordered for low fiber, DASH/TLC diet and Tolerating it. Had hypoglycemia, seen by endo. will keep On current diet therapy. Has allergy To liver and fish, communicated with kitchen

## 2023-12-03 NOTE — CONSULT NOTE ADULT - ASSESSMENT
63M visually imparied with history of ESRD on dialysis T/TH/sat, last dialysis on 11/28  HTN, NIDDM, hypothyroidism, GERD, COPD, presents from [facility] with report of coffee-ground emesis  Found to have rec hypoglycemia. Admits to eating ok. Denies prev symptoms of hypoglycemia    63M visually imparied with history of ESRD on dialysis T/TH/sat, last dialysis on 11/28  HTN, NIDDM, hypothyroidism, GERD, COPD, presents from [facility] with report of coffee-ground emesis  Found to have rec hypoglycemia. Admits to eating ok. Denies prev symptoms of hypoglycemia    63M visually imparied with history of ESRD on dialysis T/TH/sat, last dialysis on 11/28  HTN, NIDDM, hypothyroidism, GERD, COPD, presents from [facility] with report of coffee-ground emesis  Found to have rec hypoglycemia. Admits to eating ok. Denies prev symptoms of hypoglycemia

## 2023-12-03 NOTE — PROGRESS NOTE ADULT - PROBLEM SELECTOR PLAN 3
Sliding Scale Pt had elevated lactate 2.7  likely 2/2 to dehydration vs infectious origin  patient refused lab work  to follow up lactate and routine blood work with HD Pt had elevated lactate 2.7  likely 2/2 to dehydration vs infectious origin  patient refused lab work  to follow up lactate and routine blood work with HD (12/4)

## 2023-12-03 NOTE — DIETITIAN INITIAL EVALUATION ADULT - PROBLEM SELECTOR PLAN 2
K stable, mental status stable, no urgent need for dialysis at this time  HD Schedule: Tue/Thur/Sat  Last Session: 11/28  Location: [place]  Urine: unknown  Access: Patient uncooperative with exam    Plan:  Neprhology Consult: Dr Mosher Consulted  Social Work Consult for Reinstatement:  CHG Ordered  Hepatitis Panel K stable, mental status stable, no urgent need for dialysis at this time  HD Schedule: Tue/Thur/Sat  Last Session: 11/28  Location: [place]  Urine: unknown  Access: Patient uncooperative with exam    Plan:  Neprhology Consult: Dr Mosher Consulted  Social Work Consult for Reinstatement:  CHG Ordered  Hepatitis Panel

## 2023-12-03 NOTE — DISCHARGE NOTE PROVIDER - PROVIDER TOKENS
PROVIDER:[TOKEN:[[id number]],ESTABLISHEDPATIENT:[T]] PROVIDER:[TOKEN:[[id number]],ESTABLISHEDPATIENT:[T]],PROVIDER:[TOKEN:[[id number]],FOLLOWUP:[2 weeks]]

## 2023-12-03 NOTE — DISCHARGE NOTE PROVIDER - CARE PROVIDER_API CALL
Dario De La Torre  Internal Medicine  [address] [address] [place] [address], [address] [place] [address]  Phone: [phone]  Fax: [phone]  Established Patient  Follow Up Time:    Dario De La Torre  Internal Medicine  [address] [address] [place] [address], [address] [place] [address]  Phone: [phone]  Fax: [phone]  Established Patient  Follow Up Time:    Dario De La Torre Rothman  Internal Medicine  [address], [address]  [place] [address]  Phone: [phone]  Fax: [phone]  Established Patient  Follow Up Time:     Monroe Bergman  Gastroenterology  [address] [facility], [address]  [place] [address]  Phone: [phone]  Fax: [phone]  Follow Up Time: 2 weeks   Dario De La Torre Rothman  Internal Medicine  [address], [address]  [place] [address]  Phone: [phone]  Fax: [phone]  Established Patient  Follow Up Time:     Monroe Bergman  Gastroenterology  [address] [facility], [address]  [place] [address]  Phone: [phone]  Fax: [phone]  Follow Up Time: 2 weeks

## 2023-12-03 NOTE — DIETITIAN INITIAL EVALUATION ADULT - NSFNSGIIOFT_GEN_A_CORE
12-02-23 @ 07:01  -  12-03-23 @ 07:00  --------------------------------------------------------  OUT:    Stool (mL): 10 mL  Total OUT: 10 mL    Total NET: -10 mL

## 2023-12-03 NOTE — PROGRESS NOTE ADULT - PROBLEM SELECTOR PLAN 2
K stable, mental status stable, no urgent need for dialysis at this time. HD Schedule: Tue/Thur/Sat  Location: [place]. Last Session: 12/1 after admission.   Access: Left AVF on forearm.   Neprhology Dr Mosher Consulted  Social Work Consult for Reinstatement of HD  started epogen on HD and sevelamer.  CHG Ordered  f/u Hepatitis Panel (pt refused labs) K stable, mental status stable, no urgent need for dialysis at this time. HD Schedule: Tue/Thur/Sat  Location: [place]. Last Session: 12/1 after admission.   Access: Left AVF on forearm.   Neprhology Dr Mosher Consulted  Social Work Consult for Reinstatement of HD  started epogen on HD and sevelamer.  CHG Ordered  f/u Hepatitis Panel (pt refused labs) K stable, mental status stable, no urgent need for dialysis at this time. HD Schedule: Tue/Thur/Sat  Location: [place]. Last Session: 12/1 after admission.   Access: Left AVF on forearm.   Neprhology Dr Mosher Consulted  Social Work Consult for Reinstatement of HD  started epogen on HD and sevelamer.  CHG Ordered  f/u Hepatitis Panel (pt refused labs) pt had 9 episodes of diarrhea within he last 24 hours. non bloody BM. Afbrile, no n/v, abdominal pain.  Diet switched to low fiber DASH  IVF for fluid loss 2/2 diarrhea  wbc: low, not elevated from pt's baseline  started ciprofloxacin and flagyl   C. diff, gi pcr negative  f/u O&P and stool culture pt had 9 episodes of diarrhea within he last 24 hours. non bloody BM. Afbrile, no n/v, abdominal pain.  wbc: low, not elevated from pt's baseline  Diet switched to low fiber DASH  IVF for fluid loss 2/2 diarrhea  started ciprofloxacin and flagyl   - cipro 250mg dosed once due to pt. cr cl. < 29. To be dosed post HD session. Next HD session 12/4.  - flagyl 500mg tid.  C. diff, gi pcr negative  f/u O&P and stool culture

## 2023-12-03 NOTE — DISCHARGE NOTE PROVIDER - NSDCMRMEDTOKEN_GEN_ALL_CORE_FT
albuterol 90 mcg/inh inhalation aerosol: 2 puff(s) inhaled every 6 hours  amLODIPine 10 mg oral tablet: 1 tab(s) orally once a day  atorvastatin 40 mg oral tablet: 1 tab(s) orally once a day (at bedtime)  budesonide-formoterol 160 mcg-4.5 mcg/inh inhalation aerosol: 2 puff(s) inhaled 2 times a day  folic acid 1 mg oral tablet: 1 tab(s) orally once a day  hydrALAZINE 25 mg oral tablet: 1 tab(s) orally 3 times a day  insulin lispro 100 units/mL injectable solution: injectable 3 times a day (before meals) Insulin lispro (ADMELOG) corrective regimen sliding scale  1 Unit(s) if Glucose 151 - 200  2 Unit(s) if Glucose 201 - 250  3 Unit(s) if Glucose 251 - 300  4 Unit(s) if Glucose 301 - 350  5 Unit(s) if Glucose 351 - 400  6 Unit(s) if Glucose Greater Than 400  SubCutaneous three times a day before meals  insulin lispro 100 units/mL injectable solution: injectable once a day (at bedtime) insulin lispro (ADMELOG) corrective regimen sliding scale  0 Unit(s) if Glucose 0 - 250  1 Unit(s) if Glucose 251 - 300  2 Unit(s) if Glucose 301 - 350  3 Unit(s) if Glucose 351 - 400  4 Unit(s) if Glucose Greater Than 400 SubCutaneous at bedtime  lactobacillus acidophilus oral capsule: 1 tab(s) orally 2 times a day continue while on antibiotic  latanoprost 0.005% ophthalmic solution: 1 drop(s) to each affected eye once a day (at bedtime)  levothyroxine 25 mcg (0.025 mg) oral tablet: 1 tab(s) orally once a day  LORazepam 2 mg oral tablet: 1 tab(s) orally Tuesday, Thursday, Saturday Dialysis premedication  metoprolol succinate 50 mg oral tablet, extended release: 1 tab(s) orally once a day  pantoprazole 40 mg oral delayed release tablet: 1 tab(s) orally 2 times a day  sertraline 100 mg oral tablet: 2 tab(s) orally once a day  sevelamer carbonate 800 mg oral tablet: 1 tab(s) orally 3 times a day (with meals)   albuterol 90 mcg/inh inhalation aerosol: 2 puff(s) inhaled every 6 hours  amLODIPine 10 mg oral tablet: 1 tab(s) orally once a day  atorvastatin 40 mg oral tablet: 1 tab(s) orally once a day (at bedtime)  budesonide-formoterol 160 mcg-4.5 mcg/inh inhalation aerosol: 2 puff(s) inhaled 2 times a day  Cipro 500 mg oral tablet: 1 tab(s) orally once a day  folic acid 1 mg oral tablet: 1 tab(s) orally once a day  hydrALAZINE 25 mg oral tablet: 1 tab(s) orally 3 times a day  insulin lispro 100 units/mL injectable solution: injectable 3 times a day (before meals) Insulin lispro (ADMELOG) corrective regimen sliding scale  1 Unit(s) if Glucose 151 - 200  2 Unit(s) if Glucose 201 - 250  3 Unit(s) if Glucose 251 - 300  4 Unit(s) if Glucose 301 - 350  5 Unit(s) if Glucose 351 - 400  6 Unit(s) if Glucose Greater Than 400  SubCutaneous three times a day before meals  insulin lispro 100 units/mL injectable solution: injectable once a day (at bedtime) insulin lispro (ADMELOG) corrective regimen sliding scale  0 Unit(s) if Glucose 0 - 250  1 Unit(s) if Glucose 251 - 300  2 Unit(s) if Glucose 301 - 350  3 Unit(s) if Glucose 351 - 400  4 Unit(s) if Glucose Greater Than 400 SubCutaneous at bedtime  lactobacillus acidophilus oral capsule: 1 tab(s) orally 2 times a day continue while on antibiotic  latanoprost 0.005% ophthalmic solution: 1 drop(s) to each affected eye once a day (at bedtime)  levothyroxine 25 mcg (0.025 mg) oral tablet: 1 tab(s) orally once a day  LORazepam 2 mg oral tablet: 1 tab(s) orally Tuesday, Thursday, Saturday Dialysis premedication  metoprolol succinate 50 mg oral tablet, extended release: 1 tab(s) orally once a day  metroNIDAZOLE 500 mg oral tablet: 1 tab(s) orally every 8 hours  pantoprazole 40 mg oral delayed release tablet: 1 tab(s) orally 2 times a day  sertraline 100 mg oral tablet: 2 tab(s) orally once a day  sevelamer carbonate 800 mg oral tablet: 1 tab(s) orally 3 times a day (with meals)  sucralfate 1 g/10 mL oral suspension: 10 milliliter(s) orally 4 times a day

## 2023-12-03 NOTE — PROGRESS NOTE ADULT - ASSESSMENT
63M visually imparied with history of ESRD on dialysis T/TH/sat, last dialysis on 11/28  HTN, NIDDM, hypothyroidism, GERD, COPD, presents from [facility] with report of coffee-ground emesis X3 this AM. Hgb 8, no emesis reported, patient tolerated trial of ice chips CT pending, 2mg ativan given by ED, will admit for Upper GI Bleed, pending CT, GI consult   63M visually imparied with history of ESRD on dialysis T/TH/sat, last dialysis on 11/28  HTN, NIDDM, hypothyroidism, GERD, COPD, presents from [facility] with report of coffee-ground emesis X3 this AM. Hgb 8, no emesis reported, patient tolerated trial of ice chips CT pending, 2mg ativan given by ED, will admit for Upper GI Bleed, pending CT, GI consult   63M visually imparied with history of ESRD on dialysis T/TH/sat, last dialysis on 11/28  HTN, NIDDM, hypothyroidism, GERD, COPD, presents from [facility] with report of coffee-ground emesis X3 this AM. Hgb 8, no emesis reported, patient tolerated trial of ice chips CT pending, 2mg ativan given by ED, will admit for Upper GI Bleed, pending CT, GI consult   63M visually imparied with history of ESRD on dialysis T/TH/sat, last dialysis on 11/28  HTN, NIDDM, hypothyroidism, GERD, COPD, presents from [facility] with report of coffee-ground emesis X3 this AM. Hgb 8, no emesis reported, patient tolerated trial of ice chips CT pending, 2mg ativan given by ED, will admit for Upper GI Bleed, pending CT, GI consult   63M visually imparied with history of ESRD on dialysis T/TH/sat, last dialysis on 11/28  HTN, NIDDM, hypothyroidism, GERD, COPD, presents from [facility] with report of coffee-ground emesis X3 this AM. Hgb 8, no emesis reported, patient tolerated trial of ice chips CT pending, 2mg ativan given by ED, will admit for Upper GI Bleed, pending CT, GI consult   63M visually imparied with history of ESRD on dialysis T/TH/sat, last dialysis on 11/28  HTN, NIDDM, hypothyroidism, GERD, COPD, presents from [facility] with report of coffee-ground emesis X3 this AM. Hgb 8, no emesis reported, patient tolerated trial of ice chips CT pending, 2mg ativan given by ED, will admit for Upper GI Bleed, pending CT, GI consult

## 2023-12-03 NOTE — CONSULT NOTE ADULT - PROBLEM SELECTOR RECOMMENDATION 9
with s/p severe hypoglycemia  on low dose admelog and poor po intake  rec d/c all insulin   fsg ac and hs  feeding with asist  check a1c level

## 2023-12-03 NOTE — DIETITIAN INITIAL EVALUATION ADULT - PROBLEM SELECTOR PLAN 1
Anesthesiologist present for case.  See Anesthesia Record for details regarding sedation/anesthesia, medications, and vital signs.   per [facility]  Coffee ground emesis X3 in AM  However no emesis all day in ED, ice trips trial tolerated  CLD  IV protonix BID  Sucralfate  PRN Virgil SYKES Consulted ITZ Jones/Dr. Lee per [facility]  Coffee ground emesis X3 in AM  However no emesis all day in ED, ice trips trial tolerated  CLD  IV protonix BID  Sucralfate  PRN Virgil SYKES Consulted [facility]Z Jones/Dr. Lee

## 2023-12-03 NOTE — CONSULT NOTE ADULT - SUBJECTIVE AND OBJECTIVE BOX
Patient is a 63y old  Male who presents with a chief complaint of Hematemesis (03 Dec 2023 07:51)      HPI:  63M visually imparied with history of ESRD on dialysis T/TH/sat, last dialysis on 11/28  HTN, NIDDM, hypothyroidism, GERD, COPD, presents from [facility] with report of coffee-ground emesis X3 this AM. Patient uncooperative with rest of history constantly demands food, denies any other complaints.    Denies HA, CP, SOB, NVD    In the ED, Hgb 8, no emesis reported, patient tolerated trial of ice chips CT pending, 2mg ativan given by ED (30 Nov 2023 23:36)  Found to have rec hypoglycemia. Admits to eating ok. Denies prev symptoms of hypoglycemia       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      Osteoarthritis      Vision loss of left eye  blind      ESRD on hemodialysis  T/Th/S      Cataract  both eyes - hx of sx done      BPH (benign prostatic hyperplasia)      UTI (urinary tract infection)  hx of      Bladder mass  hx of      H/O hematuria      Osteoporosis      Vision loss of right eye  decreased      Depression      Chronic GERD      Osteomyelitis of vertebra      CHF (congestive heart failure)      Below knee amputation status, left  2012- pt is wearing prostesis      History of right cataract extraction      History of left cataract extraction      S/P arteriovenous (AV) fistula creation  right arm brachiocephalic arteriovenous fistula on 11/08/2018      H/O hematuria  s/p bladder bx and fulguration 2/25/2020      H/O transurethral destruction of bladder lesion  2020      History of excision of mass  back mass on 03/31/2021             MEDICATIONS  (STANDING):  amLODIPine   Tablet 10 milliGRAM(s) Oral daily  atorvastatin 40 milliGRAM(s) Oral at bedtime  budesonide 160 MICROgram(s)/formoterol 4.5 MICROgram(s) Inhaler 2 Puff(s) Inhalation two times a day  chlorhexidine 2% Cloths 1 Application(s) Topical <User Schedule>  epoetin beverley (PROCRIT) Injectable 29119 Unit(s) IV Push <User Schedule>  folic acid 1 milliGRAM(s) Oral daily  heparin   Injectable 5000 Unit(s) SubCutaneous every 12 hours  hydrALAZINE 25 milliGRAM(s) Oral three times a day  insulin lispro (ADMELOG) corrective regimen sliding scale   SubCutaneous Before meals and at bedtime  latanoprost 0.005% Ophthalmic Solution 1 Drop(s) Both EYES at bedtime  levothyroxine 25 MICROGram(s) Oral daily  LORazepam     Tablet 2 milliGRAM(s) Oral <User Schedule>  metoprolol tartrate 25 milliGRAM(s) Oral two times a day  pantoprazole  Injectable 40 milliGRAM(s) IV Push every 12 hours  sertraline 200 milliGRAM(s) Oral daily  sevelamer carbonate 800 milliGRAM(s) Oral three times a day with meals  sucralfate 1 Gram(s) Oral every 6 hours    MEDICATIONS  (PRN):  acetaminophen     Tablet .. 650 milliGRAM(s) Oral every 6 hours PRN Temp greater or equal to 38C (100.4F), Mild Pain (1 - 3)  albuterol    90 MICROgram(s) HFA Inhaler 2 Puff(s) Inhalation every 6 hours PRN Bronchospasm  melatonin 3 milliGRAM(s) Oral at bedtime PRN Insomnia  ondansetron Injectable 4 milliGRAM(s) IV Push every 8 hours PRN Nausea and/or Vomiting      FAMILY HISTORY:  Family history of cirrhosis of liver (Mother)    Family history of renal failure (Sibling)    Family history of hypertension (Sibling)    Family history of diabetes mellitus (Sibling)    History of substance abuse in sibling (Sibling)        SOCIAL HISTORY:      REVIEW OF SYSTEMS:  CONSTITUTIONAL: No fever, weight loss, or fatigue  EYES: No eye pain, visual disturbances, or discharge  ENT:  No difficulty hearing, tinnitus, vertigo; No sinus or throat pain  NECK: No pain or stiffness  RESPIRATORY: No cough, wheezing, chills or hemoptysis; No Shortness of Breath  CARDIOVASCULAR: No chest pain, palpitations, passing out, dizziness, or leg swelling  GASTROINTESTINAL: No abdominal or epigastric pain. No nausea, vomiting, or hematemesis; No diarrhea or constipation. No melena or hematochezia.  GENITOURINARY: No dysuria, frequency, hematuria, or incontinence  NEUROLOGICAL: No headaches, memory loss, loss of strength, numbness, or tremors  SKIN: No itching, burning, rashes, or lesions   LYMPH Nodes: No enlarged glands  ENDOCRINE: No heat or cold intolerance; No hair loss  MUSCULOSKELETAL: No joint pain or swelling; No muscle, back, or extremity pain  PSYCHIATRIC: No depression, anxiety, mood swings, or difficulty sleeping  HEME/LYMPH: No easy bruising, or bleeding gums  ALLERGY AND IMMUNOLOGIC: No hives or eczema	        Vital Signs Last 24 Hrs  T(C): 36.6 (03 Dec 2023 04:30), Max: 37.1 (02 Dec 2023 14:35)  T(F): 97.9 (03 Dec 2023 04:30), Max: 98.8 (02 Dec 2023 14:35)  HR: 81 (03 Dec 2023 04:30) (81 - 93)  BP: 168/81 (03 Dec 2023 04:30) (126/71 - 168/81)  BP(mean): --  RR: 18 (03 Dec 2023 04:30) (17 - 19)  SpO2: 94% (03 Dec 2023 04:30) (91% - 95%)    Parameters below as of 03 Dec 2023 04:30  Patient On (Oxygen Delivery Method): room air          Constitutional:    HEENT: nad    Neck:  No JVD, bruits or thyromegaly    Respiratory:  Clear without rales or rhonchi    Cardiovascular:  RR without murmur, rub or gallop.    Gastrointestinal: Soft without hepatosplenomegaly.    Extremities: without cyanosis, clubbing or edema.    Neurological:  Oriented   x  3    . No gross sensory or motor defects.        LABS:                        8.3    2.86  )-----------( 45       ( 02 Dec 2023 01:50 )             27.4     12-02    136  |  99  |  24<H>  ----------------------------<  53<LL>  4.3   |  30  |  8.70<H>    Ca    8.4      02 Dec 2023 01:50  Phos  3.0     12-02  Mg     2.6     12-02    TPro  6.5  /  Alb  3.0<L>  /  TBili  0.6  /  DBili  x   /  AST  17  /  ALT  11  /  AlkPhos  79  12-02    CARDIAC MARKERS ( 02 Dec 2023 01:50 )  x     / x     / 257 U/L / x     / 5.4 ng/mL        Urinalysis Basic - ( 02 Dec 2023 01:50 )    Color: x / Appearance: x / SG: x / pH: x  Gluc: 53 mg/dL / Ketone: x  / Bili: x / Urobili: x   Blood: x / Protein: x / Nitrite: x   Leuk Esterase: x / RBC: x / WBC x   Sq Epi: x / Non Sq Epi: x / Bacteria: x      CAPILLARY BLOOD GLUCOSE      POCT Blood Glucose.: 151 mg/dL (03 Dec 2023 11:36)  POCT Blood Glucose.: 104 mg/dL (03 Dec 2023 07:58)  POCT Blood Glucose.: 182 mg/dL (02 Dec 2023 21:24)  POCT Blood Glucose.: 134 mg/dL (02 Dec 2023 17:05)      RADIOLOGY & ADDITIONAL STUDIES: Patient is a 63y old  Male who presents with a chief complaint of Hematemesis (03 Dec 2023 07:51)      HPI:  63M visually imparied with history of ESRD on dialysis T/TH/sat, last dialysis on 11/28  HTN, NIDDM, hypothyroidism, GERD, COPD, presents from [facility] with report of coffee-ground emesis X3 this AM. Patient uncooperative with rest of history constantly demands food, denies any other complaints.    Denies HA, CP, SOB, NVD    In the ED, Hgb 8, no emesis reported, patient tolerated trial of ice chips CT pending, 2mg ativan given by ED (30 Nov 2023 23:36)  Found to have rec hypoglycemia. Admits to eating ok. Denies prev symptoms of hypoglycemia       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      Osteoarthritis      Vision loss of left eye  blind      ESRD on hemodialysis  T/Th/S      Cataract  both eyes - hx of sx done      BPH (benign prostatic hyperplasia)      UTI (urinary tract infection)  hx of      Bladder mass  hx of      H/O hematuria      Osteoporosis      Vision loss of right eye  decreased      Depression      Chronic GERD      Osteomyelitis of vertebra      CHF (congestive heart failure)      Below knee amputation status, left  2012- pt is wearing prostesis      History of right cataract extraction      History of left cataract extraction      S/P arteriovenous (AV) fistula creation  right arm brachiocephalic arteriovenous fistula on 11/08/2018      H/O hematuria  s/p bladder bx and fulguration 2/25/2020      H/O transurethral destruction of bladder lesion  2020      History of excision of mass  back mass on 03/31/2021             MEDICATIONS  (STANDING):  amLODIPine   Tablet 10 milliGRAM(s) Oral daily  atorvastatin 40 milliGRAM(s) Oral at bedtime  budesonide 160 MICROgram(s)/formoterol 4.5 MICROgram(s) Inhaler 2 Puff(s) Inhalation two times a day  chlorhexidine 2% Cloths 1 Application(s) Topical <User Schedule>  epoetin beverley (PROCRIT) Injectable 62556 Unit(s) IV Push <User Schedule>  folic acid 1 milliGRAM(s) Oral daily  heparin   Injectable 5000 Unit(s) SubCutaneous every 12 hours  hydrALAZINE 25 milliGRAM(s) Oral three times a day  insulin lispro (ADMELOG) corrective regimen sliding scale   SubCutaneous Before meals and at bedtime  latanoprost 0.005% Ophthalmic Solution 1 Drop(s) Both EYES at bedtime  levothyroxine 25 MICROGram(s) Oral daily  LORazepam     Tablet 2 milliGRAM(s) Oral <User Schedule>  metoprolol tartrate 25 milliGRAM(s) Oral two times a day  pantoprazole  Injectable 40 milliGRAM(s) IV Push every 12 hours  sertraline 200 milliGRAM(s) Oral daily  sevelamer carbonate 800 milliGRAM(s) Oral three times a day with meals  sucralfate 1 Gram(s) Oral every 6 hours    MEDICATIONS  (PRN):  acetaminophen     Tablet .. 650 milliGRAM(s) Oral every 6 hours PRN Temp greater or equal to 38C (100.4F), Mild Pain (1 - 3)  albuterol    90 MICROgram(s) HFA Inhaler 2 Puff(s) Inhalation every 6 hours PRN Bronchospasm  melatonin 3 milliGRAM(s) Oral at bedtime PRN Insomnia  ondansetron Injectable 4 milliGRAM(s) IV Push every 8 hours PRN Nausea and/or Vomiting      FAMILY HISTORY:  Family history of cirrhosis of liver (Mother)    Family history of renal failure (Sibling)    Family history of hypertension (Sibling)    Family history of diabetes mellitus (Sibling)    History of substance abuse in sibling (Sibling)        SOCIAL HISTORY:      REVIEW OF SYSTEMS:  CONSTITUTIONAL: No fever, weight loss, or fatigue  EYES: No eye pain, visual disturbances, or discharge  ENT:  No difficulty hearing, tinnitus, vertigo; No sinus or throat pain  NECK: No pain or stiffness  RESPIRATORY: No cough, wheezing, chills or hemoptysis; No Shortness of Breath  CARDIOVASCULAR: No chest pain, palpitations, passing out, dizziness, or leg swelling  GASTROINTESTINAL: No abdominal or epigastric pain. No nausea, vomiting, or hematemesis; No diarrhea or constipation. No melena or hematochezia.  GENITOURINARY: No dysuria, frequency, hematuria, or incontinence  NEUROLOGICAL: No headaches, memory loss, loss of strength, numbness, or tremors  SKIN: No itching, burning, rashes, or lesions   LYMPH Nodes: No enlarged glands  ENDOCRINE: No heat or cold intolerance; No hair loss  MUSCULOSKELETAL: No joint pain or swelling; No muscle, back, or extremity pain  PSYCHIATRIC: No depression, anxiety, mood swings, or difficulty sleeping  HEME/LYMPH: No easy bruising, or bleeding gums  ALLERGY AND IMMUNOLOGIC: No hives or eczema	        Vital Signs Last 24 Hrs  T(C): 36.6 (03 Dec 2023 04:30), Max: 37.1 (02 Dec 2023 14:35)  T(F): 97.9 (03 Dec 2023 04:30), Max: 98.8 (02 Dec 2023 14:35)  HR: 81 (03 Dec 2023 04:30) (81 - 93)  BP: 168/81 (03 Dec 2023 04:30) (126/71 - 168/81)  BP(mean): --  RR: 18 (03 Dec 2023 04:30) (17 - 19)  SpO2: 94% (03 Dec 2023 04:30) (91% - 95%)    Parameters below as of 03 Dec 2023 04:30  Patient On (Oxygen Delivery Method): room air          Constitutional:    HEENT: nad    Neck:  No JVD, bruits or thyromegaly    Respiratory:  Clear without rales or rhonchi    Cardiovascular:  RR without murmur, rub or gallop.    Gastrointestinal: Soft without hepatosplenomegaly.    Extremities: without cyanosis, clubbing or edema.    Neurological:  Oriented   x  3    . No gross sensory or motor defects.        LABS:                        8.3    2.86  )-----------( 45       ( 02 Dec 2023 01:50 )             27.4     12-02    136  |  99  |  24<H>  ----------------------------<  53<LL>  4.3   |  30  |  8.70<H>    Ca    8.4      02 Dec 2023 01:50  Phos  3.0     12-02  Mg     2.6     12-02    TPro  6.5  /  Alb  3.0<L>  /  TBili  0.6  /  DBili  x   /  AST  17  /  ALT  11  /  AlkPhos  79  12-02    CARDIAC MARKERS ( 02 Dec 2023 01:50 )  x     / x     / 257 U/L / x     / 5.4 ng/mL        Urinalysis Basic - ( 02 Dec 2023 01:50 )    Color: x / Appearance: x / SG: x / pH: x  Gluc: 53 mg/dL / Ketone: x  / Bili: x / Urobili: x   Blood: x / Protein: x / Nitrite: x   Leuk Esterase: x / RBC: x / WBC x   Sq Epi: x / Non Sq Epi: x / Bacteria: x      CAPILLARY BLOOD GLUCOSE      POCT Blood Glucose.: 151 mg/dL (03 Dec 2023 11:36)  POCT Blood Glucose.: 104 mg/dL (03 Dec 2023 07:58)  POCT Blood Glucose.: 182 mg/dL (02 Dec 2023 21:24)  POCT Blood Glucose.: 134 mg/dL (02 Dec 2023 17:05)      RADIOLOGY & ADDITIONAL STUDIES: Patient is a 63y old  Male who presents with a chief complaint of Hematemesis (03 Dec 2023 07:51)      HPI:  63M visually imparied with history of ESRD on dialysis T/TH/sat, last dialysis on 11/28  HTN, NIDDM, hypothyroidism, GERD, COPD, presents from [facility] with report of coffee-ground emesis X3 this AM. Patient uncooperative with rest of history constantly demands food, denies any other complaints.    Denies HA, CP, SOB, NVD    In the ED, Hgb 8, no emesis reported, patient tolerated trial of ice chips CT pending, 2mg ativan given by ED (30 Nov 2023 23:36)  Found to have rec hypoglycemia. Admits to eating ok. Denies prev symptoms of hypoglycemia       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      Osteoarthritis      Vision loss of left eye  blind      ESRD on hemodialysis  T/Th/S      Cataract  both eyes - hx of sx done      BPH (benign prostatic hyperplasia)      UTI (urinary tract infection)  hx of      Bladder mass  hx of      H/O hematuria      Osteoporosis      Vision loss of right eye  decreased      Depression      Chronic GERD      Osteomyelitis of vertebra      CHF (congestive heart failure)      Below knee amputation status, left  2012- pt is wearing prostesis      History of right cataract extraction      History of left cataract extraction      S/P arteriovenous (AV) fistula creation  right arm brachiocephalic arteriovenous fistula on 11/08/2018      H/O hematuria  s/p bladder bx and fulguration 2/25/2020      H/O transurethral destruction of bladder lesion  2020      History of excision of mass  back mass on 03/31/2021             MEDICATIONS  (STANDING):  amLODIPine   Tablet 10 milliGRAM(s) Oral daily  atorvastatin 40 milliGRAM(s) Oral at bedtime  budesonide 160 MICROgram(s)/formoterol 4.5 MICROgram(s) Inhaler 2 Puff(s) Inhalation two times a day  chlorhexidine 2% Cloths 1 Application(s) Topical <User Schedule>  epoetin beverley (PROCRIT) Injectable 45572 Unit(s) IV Push <User Schedule>  folic acid 1 milliGRAM(s) Oral daily  heparin   Injectable 5000 Unit(s) SubCutaneous every 12 hours  hydrALAZINE 25 milliGRAM(s) Oral three times a day  insulin lispro (ADMELOG) corrective regimen sliding scale   SubCutaneous Before meals and at bedtime  latanoprost 0.005% Ophthalmic Solution 1 Drop(s) Both EYES at bedtime  levothyroxine 25 MICROGram(s) Oral daily  LORazepam     Tablet 2 milliGRAM(s) Oral <User Schedule>  metoprolol tartrate 25 milliGRAM(s) Oral two times a day  pantoprazole  Injectable 40 milliGRAM(s) IV Push every 12 hours  sertraline 200 milliGRAM(s) Oral daily  sevelamer carbonate 800 milliGRAM(s) Oral three times a day with meals  sucralfate 1 Gram(s) Oral every 6 hours    MEDICATIONS  (PRN):  acetaminophen     Tablet .. 650 milliGRAM(s) Oral every 6 hours PRN Temp greater or equal to 38C (100.4F), Mild Pain (1 - 3)  albuterol    90 MICROgram(s) HFA Inhaler 2 Puff(s) Inhalation every 6 hours PRN Bronchospasm  melatonin 3 milliGRAM(s) Oral at bedtime PRN Insomnia  ondansetron Injectable 4 milliGRAM(s) IV Push every 8 hours PRN Nausea and/or Vomiting      FAMILY HISTORY:  Family history of cirrhosis of liver (Mother)    Family history of renal failure (Sibling)    Family history of hypertension (Sibling)    Family history of diabetes mellitus (Sibling)    History of substance abuse in sibling (Sibling)        SOCIAL HISTORY:      REVIEW OF SYSTEMS:  CONSTITUTIONAL: No fever, weight loss, or fatigue  EYES: No eye pain, visual disturbances, or discharge  ENT:  No difficulty hearing, tinnitus, vertigo; No sinus or throat pain  NECK: No pain or stiffness  RESPIRATORY: No cough, wheezing, chills or hemoptysis; No Shortness of Breath  CARDIOVASCULAR: No chest pain, palpitations, passing out, dizziness, or leg swelling  GASTROINTESTINAL: No abdominal or epigastric pain. No nausea, vomiting, or hematemesis; No diarrhea or constipation. No melena or hematochezia.  GENITOURINARY: No dysuria, frequency, hematuria, or incontinence  NEUROLOGICAL: No headaches, memory loss, loss of strength, numbness, or tremors  SKIN: No itching, burning, rashes, or lesions   LYMPH Nodes: No enlarged glands  ENDOCRINE: No heat or cold intolerance; No hair loss  MUSCULOSKELETAL: No joint pain or swelling; No muscle, back, or extremity pain  PSYCHIATRIC: No depression, anxiety, mood swings, or difficulty sleeping  HEME/LYMPH: No easy bruising, or bleeding gums  ALLERGY AND IMMUNOLOGIC: No hives or eczema	        Vital Signs Last 24 Hrs  T(C): 36.6 (03 Dec 2023 04:30), Max: 37.1 (02 Dec 2023 14:35)  T(F): 97.9 (03 Dec 2023 04:30), Max: 98.8 (02 Dec 2023 14:35)  HR: 81 (03 Dec 2023 04:30) (81 - 93)  BP: 168/81 (03 Dec 2023 04:30) (126/71 - 168/81)  BP(mean): --  RR: 18 (03 Dec 2023 04:30) (17 - 19)  SpO2: 94% (03 Dec 2023 04:30) (91% - 95%)    Parameters below as of 03 Dec 2023 04:30  Patient On (Oxygen Delivery Method): room air          Constitutional:    HEENT: nad    Neck:  No JVD, bruits or thyromegaly    Respiratory:  Clear without rales or rhonchi    Cardiovascular:  RR without murmur, rub or gallop.    Gastrointestinal: Soft without hepatosplenomegaly.    Extremities: without cyanosis, clubbing or edema.    Neurological:  Oriented   x  3    . No gross sensory or motor defects.        LABS:                        8.3    2.86  )-----------( 45       ( 02 Dec 2023 01:50 )             27.4     12-02    136  |  99  |  24<H>  ----------------------------<  53<LL>  4.3   |  30  |  8.70<H>    Ca    8.4      02 Dec 2023 01:50  Phos  3.0     12-02  Mg     2.6     12-02    TPro  6.5  /  Alb  3.0<L>  /  TBili  0.6  /  DBili  x   /  AST  17  /  ALT  11  /  AlkPhos  79  12-02    CARDIAC MARKERS ( 02 Dec 2023 01:50 )  x     / x     / 257 U/L / x     / 5.4 ng/mL        Urinalysis Basic - ( 02 Dec 2023 01:50 )    Color: x / Appearance: x / SG: x / pH: x  Gluc: 53 mg/dL / Ketone: x  / Bili: x / Urobili: x   Blood: x / Protein: x / Nitrite: x   Leuk Esterase: x / RBC: x / WBC x   Sq Epi: x / Non Sq Epi: x / Bacteria: x      CAPILLARY BLOOD GLUCOSE      POCT Blood Glucose.: 151 mg/dL (03 Dec 2023 11:36)  POCT Blood Glucose.: 104 mg/dL (03 Dec 2023 07:58)  POCT Blood Glucose.: 182 mg/dL (02 Dec 2023 21:24)  POCT Blood Glucose.: 134 mg/dL (02 Dec 2023 17:05)      RADIOLOGY & ADDITIONAL STUDIES:

## 2023-12-03 NOTE — DIETITIAN INITIAL EVALUATION ADULT - ORAL INTAKE PTA/DIET HISTORY
visited patient in contact isolation room. patient On and off sleeping.   Follows renal, NCS, regular consistency , 1000ml FR at nursing home. LPS 15-30, 30ml tid.  snacks bid

## 2023-12-03 NOTE — DISCHARGE NOTE PROVIDER - NSDCCPCAREPLAN_GEN_ALL_CORE_FT
PRINCIPAL DISCHARGE DIAGNOSIS  Diagnosis: Hematemesis  Assessment and Plan of Treatment: You were admitted for 3 episodes of coffe ground colored vomiting at [facility]. SInce admission you have not had any additional episodes. You wer started of pantoprazole for the likely chance of bleeding. Gastroenterology was following and suggest you get an upper endoscopy done after discharge to work up the cause of bloody vomit. Please follow up with your PCP/gasteroenterologist within 1 week of discharge.      SECONDARY DISCHARGE DIAGNOSES  Diagnosis: ESRD on hemodialysis  Assessment and Plan of Treatment: You have history of end stage renal disease (poor kindey function) prior to admission you wer undergoing dialysis at [facility] on tuesdays,thursdays and saturdays. During your course in the hospital a nephrologist was following and you continued to receive dialysis. On discharge you were reinstated on dialysis, please continue receiving dialysis as per the schedule. Please follow up with your PCP/nephrologist within 1 week of discharge.    Diagnosis: Acute diarrhea  Assessment and Plan of Treatment: During you course in the hospital you started experiencing diarrhea, you denies nausea or vomiting and abdominal pain, you did not have fevers. You were tested for infectious causes XXXXXXXXX. You were started on antibiotics ciprofloxacin and flagyl. XXXXXXXX    Diagnosis: DM (diabetes mellitus)  Assessment and Plan of Treatment: You have a history of diabetes. You need to continue monitoring your blood sugar levels closely and maintain healthy lifestyle by eating healthy diabetic regimen, and weight loss. Please continue to take your medications as prescribed. Please follow up with your PCP/Endocrinologist within a week of discharge.      Diagnosis: Hypertension  Assessment and Plan of Treatment: You have a history of Hypertension. On this admission, your Blood Pressure was adequately controlled with amlodipine.   Your blood pressure target is 120-140/80-90, maintain healthy lifestyle, low salt diet, avoid fatty food, weight loss, exercise regularly or stay active as tolerated 30 mins X 3 times per week.Notify your doctor if you have any of the following symptoms: (Dizziness, Lightheadedness, Blurry vision, Headache, Chest pain, Shortness of breath.)Please continue taking your home medications and follow-up with your PCP in 1 week from discharge to adjust medications as needed.      Diagnosis: Chronic obstructive pulmonary disease (COPD)  Assessment and Plan of Treatment: You have history of COPD without acute worseing. You were continued on your home medication symbicort and albuterol. You did not require supplimental oxygen during your course, you were breathing well without assistance. Please continue taking your home medications are directed and follow up with your PCP within 1 week of discharge.       Diagnosis: Chronic CHF  Assessment and Plan of Treatment: You have history of CHF (congestive heart failure).  On this admission, your chest xray showed minimal fluid and stable fluid around the heart. You were treated with lasix to help with remove the fluid. Please weigh yourself daily and If you gain 3lbs in 3 days, or 5lbs in a week and /or have any swelling or increased swelling in your feet, ankles, and/or stomach call your Health Care Provider.  Do not eat or drink foods containing more than 2000 mg of salt (sodium) in your diet every day and limit fluids to 800cc to 1000 cc per day.  Please take your medication as prescribed metoprolol and lasix and follow up with your PCP/Cardiologist in 1 week from discharge.    Diagnosis: Hematemesis  Assessment and Plan of Treatment:     Diagnosis: Hypothyroid  Assessment and Plan of Treatment: You have known history of hypothyroidism. You were continued on your home medication levothyroxine 25mcg once a day. Please continue taking your medication and follow up with your PCP/endocrinologist within a week of discharge.     PRINCIPAL DISCHARGE DIAGNOSIS  Diagnosis: Hematemesis  Assessment and Plan of Treatment: You were admitted for 3 episodes of coffe ground colored vomiting at [facility]. SInce admission you have not had any additional episodes. You wer started of pantoprazole for the likely chance of bleeding. Gastroenterology was following and suggest you get an upper endoscopy done after discharge to work up the cause of bloody vomit. Please follow up with your PCP/gasteroenterologist within 1 week of discharge.      SECONDARY DISCHARGE DIAGNOSES  Diagnosis: ESRD on hemodialysis  Assessment and Plan of Treatment: You have history of end stage renal disease (poor kindey function) prior to admission you wer undergoing dialysis at [facility] on tuesdays,thursdays and saturdays. During your course in the hospital a nephrologist was following and you continued to receive dialysis. On discharge you were reinstated on dialysis, please continue receiving dialysis as per the schedule. Please follow up with your PCP/nephrologist within 1 week of discharge.    Diagnosis: Acute diarrhea  Assessment and Plan of Treatment: During you course in the hospital you started experiencing diarrhea, you denies nausea or vomiting and abdominal pain, you did not have fevers. You were tested for infectious causes XXXXXXXXX. You were started on antibiotics ciprofloxacin and flagyl. XXXXXXXX    Diagnosis: DM (diabetes mellitus)  Assessment and Plan of Treatment: You have a history of diabetes. You need to continue monitoring your blood sugar levels closely and maintain healthy lifestyle by eating healthy diabetic regimen, and weight loss. Please continue to take your medications as prescribed. Please follow up with your PCP/Endocrinologist within a week of discharge.      Diagnosis: Hypertension  Assessment and Plan of Treatment: You have a history of Hypertension. On this admission, your Blood Pressure was adequately controlled with amlodipine.   Your blood pressure target is 120-140/80-90, maintain healthy lifestyle, low salt diet, avoid fatty food, weight loss, exercise regularly or stay active as tolerated 30 mins X 3 times per week.Notify your doctor if you have any of the following symptoms: (Dizziness, Lightheadedness, Blurry vision, Headache, Chest pain, Shortness of breath.)Please continue taking your home medications and follow-up with your PCP in 1 week from discharge to adjust medications as needed.      Diagnosis: Chronic obstructive pulmonary disease (COPD)  Assessment and Plan of Treatment: You have history of COPD without acute worseing. You were continued on your home medication symbicort and albuterol. You did not require supplimental oxygen during your course, you were breathing well without assistance. Please continue taking your home medications are directed and follow up with your PCP within 1 week of discharge.       Diagnosis: Chronic CHF  Assessment and Plan of Treatment: You have history of CHF (congestive heart failure).  On this admission, your chest xray showed minimal fluid and stable fluid around the heart. You were treated with lasix to help with remove the fluid. Please weigh yourself daily and If you gain 3lbs in 3 days, or 5lbs in a week and /or have any swelling or increased swelling in your feet, ankles, and/or stomach call your Health Care Provider.  Do not eat or drink foods containing more than 2000 mg of salt (sodium) in your diet every day and limit fluids to 800cc to 1000 cc per day.  Please take your medication as prescribed metoprolol and lasix and follow up with your PCP/Cardiologist in 1 week from discharge.    Diagnosis: Hematemesis  Assessment and Plan of Treatment:     Diagnosis: Hypothyroid  Assessment and Plan of Treatment: You have known history of hypothyroidism. You were continued on your home medication levothyroxine 25mcg once a day. Please continue taking your medication and follow up with your PCP/endocrinologist within a week of discharge.     PRINCIPAL DISCHARGE DIAGNOSIS  Diagnosis: Hematemesis  Assessment and Plan of Treatment: You were admitted for 3 episodes of coffe ground colored vomiting at [facility]. Since admission you have not had any additional episodes. You were started on pantoprazole for the likely chance of bleeding. Gastroenterology was following and suggest you get an upper endoscopy done after discharge.  Continue taking protonix and carafate as prescribed  Please follow up with your PCP/gasteroenterologist within 1 week of discharge.      SECONDARY DISCHARGE DIAGNOSES  Diagnosis: ESRD on hemodialysis  Assessment and Plan of Treatment: You have history of end stage renal disease (poor kindey function) prior to admission you wer undergoing dialysis at [facility] on tuesdays,thursdays and saturdays. During your course in the hospital a nephrologist was following and you continued to receive dialysis. On discharge you were reinstated on dialysis, please continue receiving dialysis as per the schedule. Please follow up with your PCP/nephrologist within 1 week of discharge.  Last HD 12/6    Diagnosis: Hematemesis  Assessment and Plan of Treatment:     Diagnosis: Acute diarrhea  Assessment and Plan of Treatment: During you course in the hospital you started experiencing diarrhea, you denies nausea or vomiting and abdominal pain, you did not have fevers. You were tested for infectious causes Cdiff and GI PCR are negative. You were started on antibiotics ciprofloxacin and flagyl.  You are to continue taking antibiotics for total of 7 day course    Diagnosis: DM (diabetes mellitus)  Assessment and Plan of Treatment: You have a history of diabetes. You need to continue monitoring your blood sugar levels closely and maintain healthy lifestyle by eating healthy diabetic regimen, and weight loss. Please continue to take your medications as prescribed. Please follow up with your PCP/Endocrinologist within a week of discharge.      Diagnosis: Chronic obstructive pulmonary disease (COPD)  Assessment and Plan of Treatment: You have history of COPD without acute worsening. You were continued on your home medication symbicort and albuterol. You did not require supplimental oxygen during your course, you were breathing well without assistance. Please continue taking your home medications are directed and follow up with your PCP within 1 week of discharge.       Diagnosis: Hypothyroid  Assessment and Plan of Treatment: You have known history of hypothyroidism. You were continued on your home medication levothyroxine 25mcg once a day. Please continue taking your medication and follow up with your PCP/endocrinologist within a week of discharge.    Diagnosis: Chronic CHF  Assessment and Plan of Treatment: You have history of CHF (congestive heart failure).  On this admission, your chest xray showed minimal fluid and stable fluid around the heart. You were treated with lasix to help with remove the fluid. Please weigh yourself daily and If you gain 3lbs in 3 days, or 5lbs in a week and /or have any swelling or increased swelling in your feet, ankles, and/or stomach call your Health Care Provider.  Do not eat or drink foods containing more than 2000 mg of salt (sodium) in your diet every day and limit fluids to 800cc to 1000 cc per day.  Please take your medication as prescribed metoprolol and lasix and follow up with your PCP/Cardiologist in 1 week from discharge.    Diagnosis: Hypertension  Assessment and Plan of Treatment: You have a history of Hypertension. On this admission, your Blood Pressure was adequately controlled with amlodipine.   Your blood pressure target is 120-140/80-90, maintain healthy lifestyle, low salt diet, avoid fatty food, weight loss, exercise regularly or stay active as tolerated 30 mins X 3 times per week.Notify your doctor if you have any of the following symptoms: (Dizziness, Lightheadedness, Blurry vision, Headache, Chest pain, Shortness of breath.)Please continue taking your home medications and follow-up with your PCP in 1 week from discharge to adjust medications as needed.       PRINCIPAL DISCHARGE DIAGNOSIS  Diagnosis: Hematemesis  Assessment and Plan of Treatment: You were admitted for 3 episodes of coffe ground colored vomiting at [facility]. Since admission you have not had any additional episodes. You were started on pantoprazole for the likely chance of bleeding. Gastroenterology was following and suggest you get an upper endoscopy done after discharge.  Continue taking protonix and carafate as prescribed  Please follow up with your PCP/gasteroenterologist within 1 week of discharge.      SECONDARY DISCHARGE DIAGNOSES  Diagnosis: ESRD on hemodialysis  Assessment and Plan of Treatment: You have history of end stage renal disease (poor kindey function) prior to admission you wer undergoing dialysis at [place] on tuesdays,thursdays and saturdays. During your course in the hospital a nephrologist was following and you continued to receive dialysis. On discharge you were reinstated on dialysis, please continue receiving dialysis as per the schedule. Please follow up with your PCP/nephrologist within 1 week of discharge.  Last HD 12/6    Diagnosis: Hematemesis  Assessment and Plan of Treatment:     Diagnosis: Acute diarrhea  Assessment and Plan of Treatment: During you course in the hospital you started experiencing diarrhea, you denies nausea or vomiting and abdominal pain, you did not have fevers. You were tested for infectious causes Cdiff and GI PCR are negative. You were started on antibiotics ciprofloxacin and flagyl.  You are to continue taking antibiotics for total of 7 day course    Diagnosis: DM (diabetes mellitus)  Assessment and Plan of Treatment: You have a history of diabetes. You need to continue monitoring your blood sugar levels closely and maintain healthy lifestyle by eating healthy diabetic regimen, and weight loss. Please continue to take your medications as prescribed. Please follow up with your PCP/Endocrinologist within a week of discharge.      Diagnosis: Chronic obstructive pulmonary disease (COPD)  Assessment and Plan of Treatment: You have history of COPD without acute worsening. You were continued on your home medication symbicort and albuterol. You did not require supplimental oxygen during your course, you were breathing well without assistance. Please continue taking your home medications are directed and follow up with your PCP within 1 week of discharge.       Diagnosis: Hypothyroid  Assessment and Plan of Treatment: You have known history of hypothyroidism. You were continued on your home medication levothyroxine 25mcg once a day. Please continue taking your medication and follow up with your PCP/endocrinologist within a week of discharge.    Diagnosis: Chronic CHF  Assessment and Plan of Treatment: You have history of CHF (congestive heart failure).  On this admission, your chest xray showed minimal fluid and stable fluid around the heart. You were treated with lasix to help with remove the fluid. Please weigh yourself daily and If you gain 3lbs in 3 days, or 5lbs in a week and /or have any swelling or increased swelling in your feet, ankles, and/or stomach call your Health Care Provider.  Do not eat or drink foods containing more than 2000 mg of salt (sodium) in your diet every day and limit fluids to 800cc to 1000 cc per day.  Please take your medication as prescribed metoprolol and lasix and follow up with your PCP/Cardiologist in 1 week from discharge.    Diagnosis: Hypertension  Assessment and Plan of Treatment: You have a history of Hypertension. On this admission, your Blood Pressure was adequately controlled with amlodipine.   Your blood pressure target is 120-140/80-90, maintain healthy lifestyle, low salt diet, avoid fatty food, weight loss, exercise regularly or stay active as tolerated 30 mins X 3 times per week.Notify your doctor if you have any of the following symptoms: (Dizziness, Lightheadedness, Blurry vision, Headache, Chest pain, Shortness of breath.)Please continue taking your home medications and follow-up with your PCP in 1 week from discharge to adjust medications as needed.

## 2023-12-03 NOTE — PROGRESS NOTE ADULT - PROBLEM SELECTOR PLAN 1
[facility] emesis X3 in AM  However no emesis all day in ED, ice trips trial tolerated  Diet: DASH diet  IV protonix BID  Sucralfate qid  PRN Zofran  GI Consulted [facility]/Dr. Lee  EGD to be done outpatient per [facility] Coffee ground emesis X3 in AM  However no emesis all day in ED, ice trips trial tolerated  Diet: DASH diet  IV protonix BID  Sucralfate qid  PRN Zofran  GI Consulted [facility] Jones/Dr. Lee  EGD to be done outpatient per [facility] Coffee ground emesis X3 in AM  However no emesis all day in ED, ice trips trial tolerated  Diet: DASH diet  IV protonix BID  Sucralfate qid  PRN Zofran  GI Consulted ITZ Jones/Dr. Lee  EGD to be done outpatient per [facility] Coffee ground emesis X3 in AM  However no emesis all day in ED, ice trips trial tolerated  IV protonix BID  Sucralfate qid  PRN Zofran  GI Consulted ITZ Jones/Dr. Lee  EGD to be done outpatient per [facility] Coffee ground emesis X3 in AM  However no emesis all day in ED, ice trips trial tolerated  IV protonix BID  Sucralfate qid  PRN Zofran  GI Consulted ITZ Jones/Dr. Lee  EGD to be done outpatient per [facility] Coffee ground emesis X3 in AM  However no emesis all day in ED, ice trips trial tolerated  IV protonix BID  Sucralfate qid  PRN Zofran  GI Consulted [facility] Jones/Dr. Lee  EGD to be done outpatient

## 2023-12-03 NOTE — PROGRESS NOTE ADULT - SUBJECTIVE AND OBJECTIVE BOX
PGY-1 Progress Note discussed with attending    PAGER #: [[phone]] TILL 5:00 PM  PLEASE CONTACT ON CALL TEAM:  - On Call Team (Please refer to Dejon) FROM 5:00 PM - 8:30PM  - Nightfloat Team FROM 8:30 -7:30 AM    CHIEF COMPLAINT & BRIEF HOSPITAL COURSE:    INTERVAL HPI/OVERNIGHT EVENTS:   MEDICATIONS  (STANDING):  amLODIPine   Tablet 10 milliGRAM(s) Oral daily  atorvastatin 40 milliGRAM(s) Oral at bedtime  budesonide 160 MICROgram(s)/formoterol 4.5 MICROgram(s) Inhaler 2 Puff(s) Inhalation two times a day  chlorhexidine 2% Cloths 1 Application(s) Topical <User Schedule>  epoetin beverley (PROCRIT) Injectable 10770 Unit(s) IV Push <User Schedule>  folic acid 1 milliGRAM(s) Oral daily  heparin   Injectable 5000 Unit(s) SubCutaneous every 12 hours  hydrALAZINE 25 milliGRAM(s) Oral three times a day  insulin lispro (ADMELOG) corrective regimen sliding scale   SubCutaneous Before meals and at bedtime  latanoprost 0.005% Ophthalmic Solution 1 Drop(s) Both EYES at bedtime  levothyroxine 25 MICROGram(s) Oral daily  LORazepam     Tablet 2 milliGRAM(s) Oral <User Schedule>  metoprolol tartrate 25 milliGRAM(s) Oral two times a day  pantoprazole  Injectable 40 milliGRAM(s) IV Push every 12 hours  sertraline 200 milliGRAM(s) Oral daily  sevelamer carbonate 800 milliGRAM(s) Oral three times a day with meals  sucralfate 1 Gram(s) Oral every 6 hours    MEDICATIONS  (PRN):  acetaminophen     Tablet .. 650 milliGRAM(s) Oral every 6 hours PRN Temp greater or equal to 38C (100.4F), Mild Pain (1 - 3)  albuterol    90 MICROgram(s) HFA Inhaler 2 Puff(s) Inhalation every 6 hours PRN Bronchospasm  melatonin 3 milliGRAM(s) Oral at bedtime PRN Insomnia  ondansetron Injectable 4 milliGRAM(s) IV Push every 8 hours PRN Nausea and/or Vomiting      REVIEW OF SYSTEMS:  CONSTITUTIONAL: No fever, weight loss, or fatigue  RESPIRATORY: No shortness of breath  CARDIOVASCULAR: No chest pain  GASTROINTESTINAL: No abdominal pain.  GENITOURINARY: No dysuria  NEUROLOGICAL: No headaches  SKIN: No itching, burning, rashes    Vital Signs Last 24 Hrs  T(C): 36.6 (03 Dec 2023 04:30), Max: 37.1 (02 Dec 2023 14:35)  T(F): 97.9 (03 Dec 2023 04:30), Max: 98.8 (02 Dec 2023 14:35)  HR: 81 (03 Dec 2023 04:30) (81 - 93)  BP: 168/81 (03 Dec 2023 04:30) (126/71 - 168/81)  BP(mean): --  RR: 18 (03 Dec 2023 04:30) (17 - 19)  SpO2: 94% (03 Dec 2023 04:30) (91% - 95%)    Parameters below as of 03 Dec 2023 04:30  Patient On (Oxygen Delivery Method): room air        PHYSICAL EXAMINATION:  GENERAL: NAD, well built  HEAD:  Atraumatic, Normocephalic  EYES:  conjunctiva and sclera clear  CHEST/LUNG: Clear to auscultation. No rales, rhonchi, wheezing, or rubs  HEART: Regular rate and rhythm; No murmurs, rubs, or gallops  ABDOMEN: Soft, Nontender, Nondistended; Bowel sounds present  NERVOUS SYSTEM:  Alert & Oriented X3,    EXTREMITIES:  2+ Peripheral Pulses, No clubbing, cyanosis, or edema  SKIN: warm dry                          8.3    2.86  )-----------( 45       ( 02 Dec 2023 01:50 )             27.4     12-02    136  |  99  |  24<H>  ----------------------------<  53<LL>  4.3   |  30  |  8.70<H>    Ca    8.4      02 Dec 2023 01:50  Phos  3.0     12-02  Mg     2.6     12-02    TPro  6.5  /  Alb  3.0<L>  /  TBili  0.6  /  DBili  x   /  AST  17  /  ALT  11  /  AlkPhos  79  12-02    LIVER FUNCTIONS - ( 02 Dec 2023 01:50 )  Alb: 3.0 g/dL / Pro: 6.5 g/dL / ALK PHOS: 79 U/L / ALT: 11 U/L DA / AST: 17 U/L / GGT: x           CARDIAC MARKERS ( 02 Dec 2023 01:50 )  x     / x     / 257 U/L / x     / 5.4 ng/mL          CAPILLARY BLOOD GLUCOSE      RADIOLOGY & ADDITIONAL TESTS:                   PGY-1 Progress Note discussed with attending    PAGER #: [[phone]] TILL 5:00 PM  PLEASE CONTACT ON CALL TEAM:  - On Call Team (Please refer to Dejon) FROM 5:00 PM - 8:30PM  - Nightfloat Team FROM 8:30 -7:30 AM    CHIEF COMPLAINT & BRIEF HOSPITAL COURSE:    INTERVAL HPI/OVERNIGHT EVENTS:   MEDICATIONS  (STANDING):  amLODIPine   Tablet 10 milliGRAM(s) Oral daily  atorvastatin 40 milliGRAM(s) Oral at bedtime  budesonide 160 MICROgram(s)/formoterol 4.5 MICROgram(s) Inhaler 2 Puff(s) Inhalation two times a day  chlorhexidine 2% Cloths 1 Application(s) Topical <User Schedule>  epoetin beverley (PROCRIT) Injectable 91964 Unit(s) IV Push <User Schedule>  folic acid 1 milliGRAM(s) Oral daily  heparin   Injectable 5000 Unit(s) SubCutaneous every 12 hours  hydrALAZINE 25 milliGRAM(s) Oral three times a day  insulin lispro (ADMELOG) corrective regimen sliding scale   SubCutaneous Before meals and at bedtime  latanoprost 0.005% Ophthalmic Solution 1 Drop(s) Both EYES at bedtime  levothyroxine 25 MICROGram(s) Oral daily  LORazepam     Tablet 2 milliGRAM(s) Oral <User Schedule>  metoprolol tartrate 25 milliGRAM(s) Oral two times a day  pantoprazole  Injectable 40 milliGRAM(s) IV Push every 12 hours  sertraline 200 milliGRAM(s) Oral daily  sevelamer carbonate 800 milliGRAM(s) Oral three times a day with meals  sucralfate 1 Gram(s) Oral every 6 hours    MEDICATIONS  (PRN):  acetaminophen     Tablet .. 650 milliGRAM(s) Oral every 6 hours PRN Temp greater or equal to 38C (100.4F), Mild Pain (1 - 3)  albuterol    90 MICROgram(s) HFA Inhaler 2 Puff(s) Inhalation every 6 hours PRN Bronchospasm  melatonin 3 milliGRAM(s) Oral at bedtime PRN Insomnia  ondansetron Injectable 4 milliGRAM(s) IV Push every 8 hours PRN Nausea and/or Vomiting      REVIEW OF SYSTEMS:  CONSTITUTIONAL: No fever, weight loss, or fatigue  RESPIRATORY: No shortness of breath  CARDIOVASCULAR: No chest pain  GASTROINTESTINAL: No abdominal pain.  GENITOURINARY: No dysuria  NEUROLOGICAL: No headaches  SKIN: No itching, burning, rashes    Vital Signs Last 24 Hrs  T(C): 36.6 (03 Dec 2023 04:30), Max: 37.1 (02 Dec 2023 14:35)  T(F): 97.9 (03 Dec 2023 04:30), Max: 98.8 (02 Dec 2023 14:35)  HR: 81 (03 Dec 2023 04:30) (81 - 93)  BP: 168/81 (03 Dec 2023 04:30) (126/71 - 168/81)  BP(mean): --  RR: 18 (03 Dec 2023 04:30) (17 - 19)  SpO2: 94% (03 Dec 2023 04:30) (91% - 95%)    Parameters below as of 03 Dec 2023 04:30  Patient On (Oxygen Delivery Method): room air        PHYSICAL EXAMINATION:  GENERAL: NAD, well built  HEAD:  Atraumatic, Normocephalic  EYES:  conjunctiva and sclera clear  CHEST/LUNG: Clear to auscultation. No rales, rhonchi, wheezing, or rubs  HEART: Regular rate and rhythm; No murmurs, rubs, or gallops  ABDOMEN: Soft, Nontender, Nondistended; Bowel sounds present  NERVOUS SYSTEM:  Alert & Oriented X3,    EXTREMITIES:  2+ Peripheral Pulses, No clubbing, cyanosis, or edema  SKIN: warm dry                          8.3    2.86  )-----------( 45       ( 02 Dec 2023 01:50 )             27.4     12-02    136  |  99  |  24<H>  ----------------------------<  53<LL>  4.3   |  30  |  8.70<H>    Ca    8.4      02 Dec 2023 01:50  Phos  3.0     12-02  Mg     2.6     12-02    TPro  6.5  /  Alb  3.0<L>  /  TBili  0.6  /  DBili  x   /  AST  17  /  ALT  11  /  AlkPhos  79  12-02    LIVER FUNCTIONS - ( 02 Dec 2023 01:50 )  Alb: 3.0 g/dL / Pro: 6.5 g/dL / ALK PHOS: 79 U/L / ALT: 11 U/L DA / AST: 17 U/L / GGT: x           CARDIAC MARKERS ( 02 Dec 2023 01:50 )  x     / x     / 257 U/L / x     / 5.4 ng/mL          CAPILLARY BLOOD GLUCOSE      RADIOLOGY & ADDITIONAL TESTS:                   PGY-1 Progress Note discussed with attending    PAGER #: [[phone]] TILL 5:00 PM  PLEASE CONTACT ON CALL TEAM:  - On Call Team (Please refer to Dejon) FROM 5:00 PM - 8:30PM  - Nightfloat Team FROM 8:30 -7:30 AM    CHIEF COMPLAINT & BRIEF HOSPITAL COURSE:    INTERVAL HPI/OVERNIGHT EVENTS:   MEDICATIONS  (STANDING):  amLODIPine   Tablet 10 milliGRAM(s) Oral daily  atorvastatin 40 milliGRAM(s) Oral at bedtime  budesonide 160 MICROgram(s)/formoterol 4.5 MICROgram(s) Inhaler 2 Puff(s) Inhalation two times a day  chlorhexidine 2% Cloths 1 Application(s) Topical <User Schedule>  epoetin beverley (PROCRIT) Injectable 77061 Unit(s) IV Push <User Schedule>  folic acid 1 milliGRAM(s) Oral daily  heparin   Injectable 5000 Unit(s) SubCutaneous every 12 hours  hydrALAZINE 25 milliGRAM(s) Oral three times a day  insulin lispro (ADMELOG) corrective regimen sliding scale   SubCutaneous Before meals and at bedtime  latanoprost 0.005% Ophthalmic Solution 1 Drop(s) Both EYES at bedtime  levothyroxine 25 MICROGram(s) Oral daily  LORazepam     Tablet 2 milliGRAM(s) Oral <User Schedule>  metoprolol tartrate 25 milliGRAM(s) Oral two times a day  pantoprazole  Injectable 40 milliGRAM(s) IV Push every 12 hours  sertraline 200 milliGRAM(s) Oral daily  sevelamer carbonate 800 milliGRAM(s) Oral three times a day with meals  sucralfate 1 Gram(s) Oral every 6 hours    MEDICATIONS  (PRN):  acetaminophen     Tablet .. 650 milliGRAM(s) Oral every 6 hours PRN Temp greater or equal to 38C (100.4F), Mild Pain (1 - 3)  albuterol    90 MICROgram(s) HFA Inhaler 2 Puff(s) Inhalation every 6 hours PRN Bronchospasm  melatonin 3 milliGRAM(s) Oral at bedtime PRN Insomnia  ondansetron Injectable 4 milliGRAM(s) IV Push every 8 hours PRN Nausea and/or Vomiting      REVIEW OF SYSTEMS:  CONSTITUTIONAL: No fever, weight loss, or fatigue  RESPIRATORY: No shortness of breath  CARDIOVASCULAR: No chest pain  GASTROINTESTINAL: No abdominal pain.  GENITOURINARY: No dysuria  NEUROLOGICAL: No headaches  SKIN: No itching, burning, rashes    Vital Signs Last 24 Hrs  T(C): 36.6 (03 Dec 2023 04:30), Max: 37.1 (02 Dec 2023 14:35)  T(F): 97.9 (03 Dec 2023 04:30), Max: 98.8 (02 Dec 2023 14:35)  HR: 81 (03 Dec 2023 04:30) (81 - 93)  BP: 168/81 (03 Dec 2023 04:30) (126/71 - 168/81)  BP(mean): --  RR: 18 (03 Dec 2023 04:30) (17 - 19)  SpO2: 94% (03 Dec 2023 04:30) (91% - 95%)    Parameters below as of 03 Dec 2023 04:30  Patient On (Oxygen Delivery Method): room air        PHYSICAL EXAMINATION:  GENERAL: NAD, well built  HEAD:  Atraumatic, Normocephalic  EYES:  conjunctiva and sclera clear  CHEST/LUNG: Clear to auscultation. No rales, rhonchi, wheezing, or rubs  HEART: Regular rate and rhythm; No murmurs, rubs, or gallops  ABDOMEN: Soft, Nontender, Nondistended; Bowel sounds present  NERVOUS SYSTEM:  Alert & Oriented X3,    EXTREMITIES:  2+ Peripheral Pulses, No clubbing, cyanosis, or edema  SKIN: warm dry                          8.3    2.86  )-----------( 45       ( 02 Dec 2023 01:50 )             27.4     12-02    136  |  99  |  24<H>  ----------------------------<  53<LL>  4.3   |  30  |  8.70<H>    Ca    8.4      02 Dec 2023 01:50  Phos  3.0     12-02  Mg     2.6     12-02    TPro  6.5  /  Alb  3.0<L>  /  TBili  0.6  /  DBili  x   /  AST  17  /  ALT  11  /  AlkPhos  79  12-02    LIVER FUNCTIONS - ( 02 Dec 2023 01:50 )  Alb: 3.0 g/dL / Pro: 6.5 g/dL / ALK PHOS: 79 U/L / ALT: 11 U/L DA / AST: 17 U/L / GGT: x           CARDIAC MARKERS ( 02 Dec 2023 01:50 )  x     / x     / 257 U/L / x     / 5.4 ng/mL          CAPILLARY BLOOD GLUCOSE      RADIOLOGY & ADDITIONAL TESTS:                   PGY-1 Progress Note discussed with attending    PAGER #: [[phone]] TILL 5:00 PM  PLEASE CONTACT ON CALL TEAM:  - On Call Team (Please refer to Dejon) FROM 5:00 PM - 8:30PM  - Nightfloat Team FROM 8:30 -7:30 AM      INTERVAL HPI/OVERNIGHT EVENTS: Patient has 7 episodes diarrhea overnight. Patient seen at bedside, patient continues to have diarrheal episodes 2 additional. No fevers, abdominal pain, N/v. Stool is yellow in color.    MEDICATIONS  (STANDING):  amLODIPine   Tablet 10 milliGRAM(s) Oral daily  atorvastatin 40 milliGRAM(s) Oral at bedtime  budesonide 160 MICROgram(s)/formoterol 4.5 MICROgram(s) Inhaler 2 Puff(s) Inhalation two times a day  chlorhexidine 2% Cloths 1 Application(s) Topical <User Schedule>  epoetin beverley (PROCRIT) Injectable 65207 Unit(s) IV Push <User Schedule>  folic acid 1 milliGRAM(s) Oral daily  heparin   Injectable 5000 Unit(s) SubCutaneous every 12 hours  hydrALAZINE 25 milliGRAM(s) Oral three times a day  insulin lispro (ADMELOG) corrective regimen sliding scale   SubCutaneous Before meals and at bedtime  latanoprost 0.005% Ophthalmic Solution 1 Drop(s) Both EYES at bedtime  levothyroxine 25 MICROGram(s) Oral daily  LORazepam     Tablet 2 milliGRAM(s) Oral <User Schedule>  metoprolol tartrate 25 milliGRAM(s) Oral two times a day  pantoprazole  Injectable 40 milliGRAM(s) IV Push every 12 hours  sertraline 200 milliGRAM(s) Oral daily  sevelamer carbonate 800 milliGRAM(s) Oral three times a day with meals  sucralfate 1 Gram(s) Oral every 6 hours    MEDICATIONS  (PRN):  acetaminophen     Tablet .. 650 milliGRAM(s) Oral every 6 hours PRN Temp greater or equal to 38C (100.4F), Mild Pain (1 - 3)  albuterol    90 MICROgram(s) HFA Inhaler 2 Puff(s) Inhalation every 6 hours PRN Bronchospasm  melatonin 3 milliGRAM(s) Oral at bedtime PRN Insomnia  ondansetron Injectable 4 milliGRAM(s) IV Push every 8 hours PRN Nausea and/or Vomiting      REVIEW OF SYSTEMS:  CONSTITUTIONAL: No fever, weight loss, or fatigue  RESPIRATORY: No shortness of breath  CARDIOVASCULAR: No chest pain  GASTROINTESTINAL: No abdominal pain, N/v. 9 diarrhea episodes non bloody. yellow in color.  GENITOURINARY: No dysuria  NEUROLOGICAL: No headaches  SKIN: No itching, burning, rashes    Vital Signs Last 24 Hrs  T(C): 36.6 (03 Dec 2023 04:30), Max: 37.1 (02 Dec 2023 14:35)  T(F): 97.9 (03 Dec 2023 04:30), Max: 98.8 (02 Dec 2023 14:35)  HR: 81 (03 Dec 2023 04:30) (81 - 93)  BP: 168/81 (03 Dec 2023 04:30) (126/71 - 168/81)  BP(mean): --  RR: 18 (03 Dec 2023 04:30) (17 - 19)  SpO2: 94% (03 Dec 2023 04:30) (91% - 95%)    Parameters below as of 03 Dec 2023 04:30  Patient On (Oxygen Delivery Method): room air        PHYSICAL EXAMINATION:  GENERAL: NAD, thinly built  HEAD:  Atraumatic, Normocephalic  EYES:  conjunctiva and sclera clear  CHEST/LUNG: Clear to auscultation. No rales, rhonchi, wheezing, or rubs  HEART: Regular rate and rhythm; No murmurs, rubs, or gallops  ABDOMEN: Soft, Nontender, Nondistended; Bowel sounds present  NERVOUS SYSTEM:  Alert & Oriented X3,     EXTREMITIES:  2+ Peripheral Pulses, No clubbing, cyanosis, or edema. AVF on left forearm.   SKIN: warm dry                          8.3    2.86  )-----------( 45       ( 02 Dec 2023 01:50 )             27.4     12-02    136  |  99  |  24<H>  ----------------------------<  53<LL>  4.3   |  30  |  8.70<H>    Ca    8.4      02 Dec 2023 01:50  Phos  3.0     12-02  Mg     2.6     12-02    TPro  6.5  /  Alb  3.0<L>  /  TBili  0.6  /  DBili  x   /  AST  17  /  ALT  11  /  AlkPhos  79  12-02    LIVER FUNCTIONS - ( 02 Dec 2023 01:50 )  Alb: 3.0 g/dL / Pro: 6.5 g/dL / ALK PHOS: 79 U/L / ALT: 11 U/L DA / AST: 17 U/L / GGT: x           CARDIAC MARKERS ( 02 Dec 2023 01:50 )  x     / x     / 257 U/L / x     / 5.4 ng/mL          CAPILLARY BLOOD GLUCOSE      RADIOLOGY & ADDITIONAL TESTS:                   PGY-1 Progress Note discussed with attending    PAGER #: [[phone]] TILL 5:00 PM  PLEASE CONTACT ON CALL TEAM:  - On Call Team (Please refer to Dejon) FROM 5:00 PM - 8:30PM  - Nightfloat Team FROM 8:30 -7:30 AM      INTERVAL HPI/OVERNIGHT EVENTS: Patient has 7 episodes diarrhea overnight. Patient seen at bedside, patient continues to have diarrheal episodes 2 additional. No fevers, abdominal pain, N/v. Stool is yellow in color.    MEDICATIONS  (STANDING):  amLODIPine   Tablet 10 milliGRAM(s) Oral daily  atorvastatin 40 milliGRAM(s) Oral at bedtime  budesonide 160 MICROgram(s)/formoterol 4.5 MICROgram(s) Inhaler 2 Puff(s) Inhalation two times a day  chlorhexidine 2% Cloths 1 Application(s) Topical <User Schedule>  epoetin beverley (PROCRIT) Injectable 62197 Unit(s) IV Push <User Schedule>  folic acid 1 milliGRAM(s) Oral daily  heparin   Injectable 5000 Unit(s) SubCutaneous every 12 hours  hydrALAZINE 25 milliGRAM(s) Oral three times a day  insulin lispro (ADMELOG) corrective regimen sliding scale   SubCutaneous Before meals and at bedtime  latanoprost 0.005% Ophthalmic Solution 1 Drop(s) Both EYES at bedtime  levothyroxine 25 MICROGram(s) Oral daily  LORazepam     Tablet 2 milliGRAM(s) Oral <User Schedule>  metoprolol tartrate 25 milliGRAM(s) Oral two times a day  pantoprazole  Injectable 40 milliGRAM(s) IV Push every 12 hours  sertraline 200 milliGRAM(s) Oral daily  sevelamer carbonate 800 milliGRAM(s) Oral three times a day with meals  sucralfate 1 Gram(s) Oral every 6 hours    MEDICATIONS  (PRN):  acetaminophen     Tablet .. 650 milliGRAM(s) Oral every 6 hours PRN Temp greater or equal to 38C (100.4F), Mild Pain (1 - 3)  albuterol    90 MICROgram(s) HFA Inhaler 2 Puff(s) Inhalation every 6 hours PRN Bronchospasm  melatonin 3 milliGRAM(s) Oral at bedtime PRN Insomnia  ondansetron Injectable 4 milliGRAM(s) IV Push every 8 hours PRN Nausea and/or Vomiting      REVIEW OF SYSTEMS:  CONSTITUTIONAL: No fever, weight loss, or fatigue  RESPIRATORY: No shortness of breath  CARDIOVASCULAR: No chest pain  GASTROINTESTINAL: No abdominal pain, N/v. 9 diarrhea episodes non bloody. yellow in color.  GENITOURINARY: No dysuria  NEUROLOGICAL: No headaches  SKIN: No itching, burning, rashes    Vital Signs Last 24 Hrs  T(C): 36.6 (03 Dec 2023 04:30), Max: 37.1 (02 Dec 2023 14:35)  T(F): 97.9 (03 Dec 2023 04:30), Max: 98.8 (02 Dec 2023 14:35)  HR: 81 (03 Dec 2023 04:30) (81 - 93)  BP: 168/81 (03 Dec 2023 04:30) (126/71 - 168/81)  BP(mean): --  RR: 18 (03 Dec 2023 04:30) (17 - 19)  SpO2: 94% (03 Dec 2023 04:30) (91% - 95%)    Parameters below as of 03 Dec 2023 04:30  Patient On (Oxygen Delivery Method): room air        PHYSICAL EXAMINATION:  GENERAL: NAD, thinly built  HEAD:  Atraumatic, Normocephalic  EYES:  conjunctiva and sclera clear  CHEST/LUNG: Clear to auscultation. No rales, rhonchi, wheezing, or rubs  HEART: Regular rate and rhythm; No murmurs, rubs, or gallops  ABDOMEN: Soft, Nontender, Nondistended; Bowel sounds present  NERVOUS SYSTEM:  Alert & Oriented X3,     EXTREMITIES:  2+ Peripheral Pulses, No clubbing, cyanosis, or edema. AVF on left forearm.   SKIN: warm dry                          8.3    2.86  )-----------( 45       ( 02 Dec 2023 01:50 )             27.4     12-02    136  |  99  |  24<H>  ----------------------------<  53<LL>  4.3   |  30  |  8.70<H>    Ca    8.4      02 Dec 2023 01:50  Phos  3.0     12-02  Mg     2.6     12-02    TPro  6.5  /  Alb  3.0<L>  /  TBili  0.6  /  DBili  x   /  AST  17  /  ALT  11  /  AlkPhos  79  12-02    LIVER FUNCTIONS - ( 02 Dec 2023 01:50 )  Alb: 3.0 g/dL / Pro: 6.5 g/dL / ALK PHOS: 79 U/L / ALT: 11 U/L DA / AST: 17 U/L / GGT: x           CARDIAC MARKERS ( 02 Dec 2023 01:50 )  x     / x     / 257 U/L / x     / 5.4 ng/mL          CAPILLARY BLOOD GLUCOSE      RADIOLOGY & ADDITIONAL TESTS:                   PGY-1 Progress Note discussed with attending    PAGER #: [[phone]] TILL 5:00 PM  PLEASE CONTACT ON CALL TEAM:  - On Call Team (Please refer to Dejon) FROM 5:00 PM - 8:30PM  - Nightfloat Team FROM 8:30 -7:30 AM      INTERVAL HPI/OVERNIGHT EVENTS: Patient has 7 episodes diarrhea overnight. Patient seen at bedside, patient continues to have diarrheal episodes 2 additional. No fevers, abdominal pain, N/v. Stool is yellow in color.    MEDICATIONS  (STANDING):  amLODIPine   Tablet 10 milliGRAM(s) Oral daily  atorvastatin 40 milliGRAM(s) Oral at bedtime  budesonide 160 MICROgram(s)/formoterol 4.5 MICROgram(s) Inhaler 2 Puff(s) Inhalation two times a day  chlorhexidine 2% Cloths 1 Application(s) Topical <User Schedule>  epoetin beverley (PROCRIT) Injectable 45103 Unit(s) IV Push <User Schedule>  folic acid 1 milliGRAM(s) Oral daily  heparin   Injectable 5000 Unit(s) SubCutaneous every 12 hours  hydrALAZINE 25 milliGRAM(s) Oral three times a day  insulin lispro (ADMELOG) corrective regimen sliding scale   SubCutaneous Before meals and at bedtime  latanoprost 0.005% Ophthalmic Solution 1 Drop(s) Both EYES at bedtime  levothyroxine 25 MICROGram(s) Oral daily  LORazepam     Tablet 2 milliGRAM(s) Oral <User Schedule>  metoprolol tartrate 25 milliGRAM(s) Oral two times a day  pantoprazole  Injectable 40 milliGRAM(s) IV Push every 12 hours  sertraline 200 milliGRAM(s) Oral daily  sevelamer carbonate 800 milliGRAM(s) Oral three times a day with meals  sucralfate 1 Gram(s) Oral every 6 hours    MEDICATIONS  (PRN):  acetaminophen     Tablet .. 650 milliGRAM(s) Oral every 6 hours PRN Temp greater or equal to 38C (100.4F), Mild Pain (1 - 3)  albuterol    90 MICROgram(s) HFA Inhaler 2 Puff(s) Inhalation every 6 hours PRN Bronchospasm  melatonin 3 milliGRAM(s) Oral at bedtime PRN Insomnia  ondansetron Injectable 4 milliGRAM(s) IV Push every 8 hours PRN Nausea and/or Vomiting      REVIEW OF SYSTEMS:  CONSTITUTIONAL: No fever, weight loss, or fatigue  RESPIRATORY: No shortness of breath  CARDIOVASCULAR: No chest pain  GASTROINTESTINAL: No abdominal pain, N/v. 9 diarrhea episodes non bloody. yellow in color.  GENITOURINARY: No dysuria  NEUROLOGICAL: No headaches  SKIN: No itching, burning, rashes    Vital Signs Last 24 Hrs  T(C): 36.6 (03 Dec 2023 04:30), Max: 37.1 (02 Dec 2023 14:35)  T(F): 97.9 (03 Dec 2023 04:30), Max: 98.8 (02 Dec 2023 14:35)  HR: 81 (03 Dec 2023 04:30) (81 - 93)  BP: 168/81 (03 Dec 2023 04:30) (126/71 - 168/81)  BP(mean): --  RR: 18 (03 Dec 2023 04:30) (17 - 19)  SpO2: 94% (03 Dec 2023 04:30) (91% - 95%)    Parameters below as of 03 Dec 2023 04:30  Patient On (Oxygen Delivery Method): room air        PHYSICAL EXAMINATION:  GENERAL: NAD, thinly built  HEAD:  Atraumatic, Normocephalic  EYES:  conjunctiva and sclera clear  CHEST/LUNG: Clear to auscultation. No rales, rhonchi, wheezing, or rubs  HEART: Regular rate and rhythm; No murmurs, rubs, or gallops  ABDOMEN: Soft, Nontender, Nondistended; Bowel sounds present  NERVOUS SYSTEM:  Alert & Oriented X3,     EXTREMITIES:  2+ Peripheral Pulses, No clubbing, cyanosis, or edema. AVF on left forearm.   SKIN: warm dry                          8.3    2.86  )-----------( 45       ( 02 Dec 2023 01:50 )             27.4     12-02    136  |  99  |  24<H>  ----------------------------<  53<LL>  4.3   |  30  |  8.70<H>    Ca    8.4      02 Dec 2023 01:50  Phos  3.0     12-02  Mg     2.6     12-02    TPro  6.5  /  Alb  3.0<L>  /  TBili  0.6  /  DBili  x   /  AST  17  /  ALT  11  /  AlkPhos  79  12-02    LIVER FUNCTIONS - ( 02 Dec 2023 01:50 )  Alb: 3.0 g/dL / Pro: 6.5 g/dL / ALK PHOS: 79 U/L / ALT: 11 U/L DA / AST: 17 U/L / GGT: x           CARDIAC MARKERS ( 02 Dec 2023 01:50 )  x     / x     / 257 U/L / x     / 5.4 ng/mL          CAPILLARY BLOOD GLUCOSE      RADIOLOGY & ADDITIONAL TESTS:

## 2023-12-04 NOTE — PROGRESS NOTE ADULT - PROBLEM SELECTOR PLAN 3
Pt had elevated lactate 2.7  likely 2/2 to dehydration vs infectious origin  patient refused lab work  to follow up lactate and routine blood work with HD (12/4) Pt had elevated lactate 2.7  likely 2/2 to dehydration vs infectious origin  - f/u  lactate and routine blood work with HD (12/4)

## 2023-12-04 NOTE — PROGRESS NOTE ADULT - ASSESSMENT
62 year old male from [facility], walks with RW, with PMH of ESRD on HD (TTS), BKA- L w/prosthetic leg, DM, Left eye blindness, CHF, CAD, HTN, HLD, osteoporosis, bronchitis, current smoker, and anemia who presents with complaint of coffee ground emesis    # ESRD. on HD TTS as outpt. was last dilaysed on Friday.  HD today- refused to complete HD and asked for HD to be stopped.  # anemia of CKD and Gastrointestinal bleed. bleed. epogen on HD. Transfuse for HBG <7.   #CKDMBD. cont sevelamer  # HTN. blood pressure at goal  # PVD Left BKA, no active issues     D/C planning    62 year old male from [facility], walks with RW, with PMH of ESRD on HD (TTS), BKA- L w/prosthetic leg, DM, Left eye blindness, CHF, CAD, HTN, HLD, osteoporosis, bronchitis, current smoker, and anemia who presents with complaint of coffee ground emesis    # ESRD. on HD TTS as outpt. was last dilaysed on Friday.  HD today- refused to complete HD and asked for HD to be stopped.  # anemia of CKD and Gastrointestinal bleed. bleed. epogen on HD. Transfuse for HBG <7.   #CKDMBD. cont sevelamer  # HTN. blood pressure at goal  # PVD Left BKA, no active issues     D/C planning

## 2023-12-04 NOTE — PROGRESS NOTE ADULT - SUBJECTIVE AND OBJECTIVE BOX
[facility] : Progress Note :: [phone], (office [phone]),   Dr Mosher / Dr Washington / Dr Young / Dr Doll / Dr Varsha TURCIOS / Dr Tello / Dr Garcia / Dr Larry qiu  _____________________________________________________________________________________________  Had HD today.  refused to complete treatment and threatened to pull out needles.    No Known Drug Allergies  fish (Rash)  liver (Anaphylaxis)    Hospital Medications:   MEDICATIONS  (STANDING):  amLODIPine   Tablet 10 milliGRAM(s) Oral daily  atorvastatin 40 milliGRAM(s) Oral at bedtime  budesonide 160 MICROgram(s)/formoterol 4.5 MICROgram(s) Inhaler 2 Puff(s) Inhalation two times a day  chlorhexidine 2% Cloths 1 Application(s) Topical <User Schedule>  ciprofloxacin     Tablet 500 milliGRAM(s) Oral every 24 hours  epoetin beverley (PROCRIT) Injectable 00547 Unit(s) IV Push <User Schedule>  folic acid 1 milliGRAM(s) Oral daily  heparin   Injectable 5000 Unit(s) SubCutaneous every 12 hours  hydrALAZINE 25 milliGRAM(s) Oral three times a day  latanoprost 0.005% Ophthalmic Solution 1 Drop(s) Both EYES at bedtime  levothyroxine 25 MICROGram(s) Oral daily  LORazepam     Tablet 2 milliGRAM(s) Oral <User Schedule>  metoprolol tartrate 25 milliGRAM(s) Oral two times a day  metroNIDAZOLE    Tablet 500 milliGRAM(s) Oral every 8 hours  pantoprazole  Injectable 40 milliGRAM(s) IV Push every 12 hours  sertraline 200 milliGRAM(s) Oral daily  sevelamer carbonate 800 milliGRAM(s) Oral three times a day with meals  sucralfate 1 Gram(s) Oral every 6 hours      VITALS:  T(F): 97 (12-04-23 @ 15:34), Max: 98.7 (12-04-23 @ 13:38)  HR: 94 (12-04-23 @ 15:34)  BP: 142/84 (12-04-23 @ 15:34)  RR: 18 (12-04-23 @ 15:34)  SpO2: 97% (12-04-23 @ 15:34)  Wt(kg): --      PHYSICAL EXAM:  Constitutional: NAD  HEENT: anicteric sclera, oropharynx clear,.  Neck: No JVD  Respiratory: CTAB, no wheezes, rales or rhonchi  Cardiovascular: S1, S2, RRR  Gastrointestinal: BS+, soft, NT/ND  Extremities: No peripheral edema  Neurological: A/O x 3, no focal deficits.  : No CVA tenderness. No cleveland.   Skin: No rashes  Vascular Access: AVF with thrill and bruit     LABS:        Creatinine Trend: 8.70 <--, 13.30 <--, 12.70 <--    Urine Studies:  Urinalysis Basic - ( 02 Dec 2023 01:50 )    Color:  / Appearance:  / SG:  / pH:   Gluc: 53 mg/dL / Ketone:   / Bili:  / Urobili:    Blood:  / Protein:  / Nitrite:    Leuk Esterase:  / RBC:  / WBC    Sq Epi:  / Non Sq Epi:  / Bacteria:         RADIOLOGY & ADDITIONAL STUDIES:   [facility] : Progress Note :: [phone], (office [phone]),   Dr Mosher / Dr Washington / Dr Young / Dr Doll / Dr Varsha TURCIOS / Dr Tello / Dr Garcia / Dr Larry qiu  _____________________________________________________________________________________________  Had HD today.  refused to complete treatment and threatened to pull out needles.    No Known Drug Allergies  fish (Rash)  liver (Anaphylaxis)    Hospital Medications:   MEDICATIONS  (STANDING):  amLODIPine   Tablet 10 milliGRAM(s) Oral daily  atorvastatin 40 milliGRAM(s) Oral at bedtime  budesonide 160 MICROgram(s)/formoterol 4.5 MICROgram(s) Inhaler 2 Puff(s) Inhalation two times a day  chlorhexidine 2% Cloths 1 Application(s) Topical <User Schedule>  ciprofloxacin     Tablet 500 milliGRAM(s) Oral every 24 hours  epoetin beverley (PROCRIT) Injectable 82765 Unit(s) IV Push <User Schedule>  folic acid 1 milliGRAM(s) Oral daily  heparin   Injectable 5000 Unit(s) SubCutaneous every 12 hours  hydrALAZINE 25 milliGRAM(s) Oral three times a day  latanoprost 0.005% Ophthalmic Solution 1 Drop(s) Both EYES at bedtime  levothyroxine 25 MICROGram(s) Oral daily  LORazepam     Tablet 2 milliGRAM(s) Oral <User Schedule>  metoprolol tartrate 25 milliGRAM(s) Oral two times a day  metroNIDAZOLE    Tablet 500 milliGRAM(s) Oral every 8 hours  pantoprazole  Injectable 40 milliGRAM(s) IV Push every 12 hours  sertraline 200 milliGRAM(s) Oral daily  sevelamer carbonate 800 milliGRAM(s) Oral three times a day with meals  sucralfate 1 Gram(s) Oral every 6 hours      VITALS:  T(F): 97 (12-04-23 @ 15:34), Max: 98.7 (12-04-23 @ 13:38)  HR: 94 (12-04-23 @ 15:34)  BP: 142/84 (12-04-23 @ 15:34)  RR: 18 (12-04-23 @ 15:34)  SpO2: 97% (12-04-23 @ 15:34)  Wt(kg): --      PHYSICAL EXAM:  Constitutional: NAD  HEENT: anicteric sclera, oropharynx clear,.  Neck: No JVD  Respiratory: CTAB, no wheezes, rales or rhonchi  Cardiovascular: S1, S2, RRR  Gastrointestinal: BS+, soft, NT/ND  Extremities: No peripheral edema  Neurological: A/O x 3, no focal deficits.  : No CVA tenderness. No cleveland.   Skin: No rashes  Vascular Access: AVF with thrill and bruit     LABS:        Creatinine Trend: 8.70 <--, 13.30 <--, 12.70 <--    Urine Studies:  Urinalysis Basic - ( 02 Dec 2023 01:50 )    Color:  / Appearance:  / SG:  / pH:   Gluc: 53 mg/dL / Ketone:   / Bili:  / Urobili:    Blood:  / Protein:  / Nitrite:    Leuk Esterase:  / RBC:  / WBC    Sq Epi:  / Non Sq Epi:  / Bacteria:         RADIOLOGY & ADDITIONAL STUDIES:

## 2023-12-04 NOTE — PROGRESS NOTE ADULT - ASSESSMENT
63M visually imparied with history of ESRD on dialysis T/TH/sat, last dialysis on 11/28  HTN, NIDDM, hypothyroidism, GERD, COPD, presents from [facility] with report of coffee-ground emesis X3 this AM. Hgb 8, no emesis reported, patient tolerated trial of ice chips CT pending, 2mg ativan given by ED, will admit for Upper GI Bleed, pending CT, GI consult   63M visually imparied with history of ESRD on dialysis T/TH/sat, last dialysis on 11/28  HTN, NIDDM, hypothyroidism, GERD, COPD, presents from [facility] with report of coffee-ground emesis X3 this AM. Hgb 8, no emesis reported, patient tolerated trial of ice chips CT pending, 2mg ativan given by ED, will admit for Upper GI Bleed, pending CT, GI consult

## 2023-12-04 NOTE — PROGRESS NOTE ADULT - PROBLEM SELECTOR PLAN 2
per [facility] Coffee ground emesis X3 in AM  However no emesis all day in ED, ice trips trial tolerated  IV protonix BID  Sucralfate qid  PRN Zofran  GI Consulted ITZ Jones/Dr. Lee  EGD to be done outpatient per [facility] Coffee ground emesis X3 in AM  However no emesis all day in ED, ice trips trial tolerated  IV protonix BID  Sucralfate qid  PRN Zofran  GI Consulted ITZ Jones/Dr. Lee  EGD to be done outpatient per [facility] Coffee ground emesis X3 in AM  However no emesis all day in ED, ice trips trial tolerated  CT A&P:  Dilated main pancreatic duct redemonstrated.  IV protonix BI  Sucralfate qid  PRN Zofran  GI Consulted NP Karen/Dr. Lee  EGD to be done outpatient per [facility] Coffee ground emesis X3 in AM  However no emesis all day in ED, ice trips trial tolerated  CT A&P:  Dilated main pancreatic duct redemonstrated.  IV protonix BI  Sucralfate qid  PRN Zofran  GI Consulted NP Karen/Dr. Lee  EGD to be done outpatient

## 2023-12-04 NOTE — PROGRESS NOTE ADULT - ATTENDING COMMENTS
HPI:  63M visually imparied with history of ESRD on dialysis T/TH/sat, last dialysis on 11/28  HTN, NIDDM, hypothyroidism, GERD, COPD, presents from [facility] with report of coffee-ground emesis X3 this AM. Patient uncooperative with rest of history constantly demands food, denies any other complaints.    Denies HA, CP, SOB, NVD    In the ED, Hgb 8, no emesis reported, patient tolerated trial of ice chips CT pending, 2mg ativan given by ED (30 Nov 2023 23:36)    # DIARRHEA  - F/U STOOL STUDIES  - CIPROFLOXACIN + FLAGYLL, F/U BCX   - MONITORING CLOSELY    # ELEVATED LACTIC ACID  - TREND    # RECURRENT INTRACTABLE NAUSEA/VOMITING, ? COFFEE GROUND EMESIS - IMPROVED  # GASTROPARESIS  # HISTORY OF ESOPHAGITIS AND DUODENITIS [1/2021], HX OF GASTROPARESIS W/ EVIDENCE OF THICKENED ESOPHAGUS ON PREVIOUS CT SCAN   # PANCREAS W/ DOUBLE DUCT SIGN    - MONITORING HGB, PLACED ON PPI BID , CARAFATE QID  - PRN ANTIEMETICS    - MONITORING FOR SYMPTOMS  - WHILE ON DIET PATIENT REPEATEDLY COUNSELLED TO CHEW FOOD CAUTIOUSLY AND CONSUME SMALL BITES W/ REGULAR CLEARING WITH WATER BETWEEN BITES    - ADVANCE DIET AS TOLERATED  - NOTED CT A/P    - GI CONSULT    # EPISODE OF HYPOGLYCEMIA   # GASTROPARESIS  # UNDERLYING DM  - SSI + FS    - PATIENT WAS RAPID RESPONSE DUE TO HYPOGLYCEMIA - IMPROVED S/P DEXTROSE PUSH, NOW MAINTAINING EUGLYCEMIA    # UNCONTROLLED HTN  # HYPERKALEMIA - IMPROVED  # ESRD ON HD TTS - W/ HX OF NONCOMPLIANCE    - TELEMETRY  - HYDRALAZINE, METOPROLOL AND NORVASC ; PRN IV ANTIHYPERTENSIVE  - LOKELMA  - SUPPLEMENTAL O2  - PLANNED FOR HD  - NEPHROLOGY CONSULT  - PULMONOLOGY CONSULT  - ID CONSULT    # DEPRESSION  - PLACED ON ZOLOFT    # PATIENT UNDERGOING OUTPATIENT WORKUP FOR CENTRAL VENOUS STENOSIS THROUGH NEPHROLOGY TEAM  - ? s/p STENT PLACEMENT    # ANEMIA OF CKD  # HX OF PANCYTOPENIA   - TREND HGB, TRANSFUSION THRESHOLD HGB < 7  - TYPE AND SCREEN  - ON EPO    - PPI BID, CARAFATE QID    # HX OF COPD  # PULMONARY HYPERTENSION    # SEVERE PROTEIN CALORIE MALNUTRITION, FAILURE TO THRIVE   -  TEMPORAL WASTING, LOSS OF MUSCLE MASS FROM SHOULDER AND HIP GIRDLE  - NUTRITIONAL SUPPLEMENT    # HLD  # PARTIALLY BLIND  # S/P LEFT BKA  # HX OF PVD  # LS SPINAL STENOSIS  # GI AND DVT PPX. HPI:  63M visually imparied with history of ESRD on dialysis T/TH/sat, last dialysis on 11/28  HTN, NIDDM, hypothyroidism, GERD, COPD, presents from [facility] with report of coffee-ground emesis X3 this AM. Patient uncooperative with rest of history constantly demands food, denies any other complaints.    Denies HA, CP, SOB, NVD    In the ED, Hgb 8, no emesis reported, patient tolerated trial of ice chips CT pending, 2mg ativan given by ED (30 Nov 2023 23:36)    # DIARRHEA  - F/U STOOL STUDIES  - CIPROFLOXACIN + FLAGYLL, F/U BCX   - MONITORING CLOSELY    # ELEVATED LACTIC ACID  - TREND    # RECURRENT INTRACTABLE NAUSEA/VOMITING, ? COFFEE GROUND EMESIS - IMPROVED  # GASTROPARESIS  # HISTORY OF ESOPHAGITIS AND DUODENITIS [1/2021], HX OF GASTROPARESIS W/ EVIDENCE OF THICKENED ESOPHAGUS ON PREVIOUS CT SCAN   # PANCREAS W/ DOUBLE DUCT SIGN    - MONITORING HGB, PLACED ON PPI BID , CARAFATE QID  - PRN ANTIEMETICS    - MONITORING FOR SYMPTOMS  - WHILE ON DIET PATIENT REPEATEDLY COUNSELLED TO CHEW FOOD CAUTIOUSLY AND CONSUME SMALL BITES W/ REGULAR CLEARING WITH WATER BETWEEN BITES    - ADVANCE DIET AS TOLERATED  - NOTED CT A/P    - GI CONSULT    # EPISODE OF HYPOGLYCEMIA   # GASTROPARESIS  # UNDERLYING DM  - SSI + FS    - PATIENT WAS RAPID RESPONSE DUE TO HYPOGLYCEMIA - IMPROVED S/P DEXTROSE PUSH, NOW MAINTAINING EUGLYCEMIA    # UNCONTROLLED HTN  # HYPERKALEMIA - IMPROVED  # ESRD ON HD TTS - W/ HX OF NONCOMPLIANCE    - TELEMETRY  - HYDRALAZINE, METOPROLOL AND NORVASC ; PRN IV ANTIHYPERTENSIVE  - LOKELMA  - SUPPLEMENTAL O2  - PLANNED FOR HD  - NEPHROLOGY CONSULT  - PULMONOLOGY CONSULT  - ID CONSULT    # DEPRESSION  - PLACED ON ZOLOFT    # PATIENT UNDERGOING OUTPATIENT WORKUP FOR CENTRAL VENOUS STENOSIS THROUGH NEPHROLOGY TEAM  - ? s/p STENT PLACEMENT    # ANEMIA OF CKD  # HX OF PANCYTOPENIA   - TREND HGB, TRANSFUSION THRESHOLD HGB < 7  - TYPE AND SCREEN  - ON EPO    - PPI BID, CARAFATE QID    # HX OF COPD  # PULMONARY HYPERTENSION    # SEVERE PROTEIN CALORIE MALNUTRITION, FAILURE TO THRIVE   -  TEMPORAL WASTING, LOSS OF MUSCLE MASS FROM SHOULDER AND HIP GIRDLE  - NUTRITIONAL SUPPLEMENT    # HLD  # PARTIALLY BLIND  # S/P LEFT BKA  # HX OF PVD  # LS SPINAL STENOSIS  # GI AND DVT PPX. HPI:  63M visually imparied with history of ESRD on dialysis T/TH/sat, last dialysis on 11/28  HTN, NIDDM, hypothyroidism, GERD, COPD, presents from [facility] with report of coffee-ground emesis X3 this AM. Patient uncooperative with rest of history constantly demands food, denies any other complaints.    Denies HA, CP, SOB, NVD    In the ED, Hgb 8, no emesis reported, patient tolerated trial of ice chips CT pending, 2mg ativan given by ED (30 Nov 2023 23:36)    # PATIENT INTERMITTENTLY REFUSING MEDICATIONS AND REFUSED TO COMPLETE - COUNSELLED PATIENT AT LENGTH AND D/W PATIENT RISKS OF DEFERRING INCLUDE BUT ARE NOT LIMITED TO WORSENING PATHOLOGY, ARRHYTHMIA, RESPIRATORY DISTRESS, WORSENING INFECTION AND DEATH. PATIENT VERBALIZED UNDERSTANDING.    # DIARRHEA  - F/U STOOL STUDIES  - CIPROFLOXACIN + FLAGYLL, F/U BCX   - MONITORING CLOSELY  - GI CONSULT    # ELEVATED LACTIC ACID  - TREND    # RECURRENT INTRACTABLE NAUSEA/VOMITING, ? COFFEE GROUND EMESIS - IMPROVED  # GASTROPARESIS  # HISTORY OF ESOPHAGITIS AND DUODENITIS [1/2021], HX OF GASTROPARESIS W/ EVIDENCE OF THICKENED ESOPHAGUS ON PREVIOUS CT SCAN   # PANCREAS W/ DOUBLE DUCT SIGN    - MONITORING HGB, PLACED ON PPI BID , CARAFATE QID  - PRN ANTIEMETICS    - MONITORING FOR SYMPTOMS  - WHILE ON DIET PATIENT REPEATEDLY COUNSELLED TO CHEW FOOD CAUTIOUSLY AND CONSUME SMALL BITES W/ REGULAR CLEARING WITH WATER BETWEEN BITES    - ADVANCE DIET AS TOLERATED  - NOTED CT A/P    - GI CONSULT    # EPISODE OF HYPOGLYCEMIA   # GASTROPARESIS  # UNDERLYING DM  - SSI + FS    - PATIENT WAS RAPID RESPONSE DUE TO HYPOGLYCEMIA - IMPROVED S/P DEXTROSE PUSH, NOW MAINTAINING EUGLYCEMIA    # UNCONTROLLED HTN  # HYPERKALEMIA - IMPROVED  # ESRD ON HD TTS - W/ HX OF NONCOMPLIANCE    - TELEMETRY  - HYDRALAZINE, METOPROLOL AND NORVASC ; PRN IV ANTIHYPERTENSIVE  - LOKELMA  - SUPPLEMENTAL O2  - PLANNED FOR HD  - NEPHROLOGY CONSULT  - PULMONOLOGY CONSULT  - ID CONSULT    # DEPRESSION  - PLACED ON ZOLOFT    # PATIENT UNDERGOING OUTPATIENT WORKUP FOR CENTRAL VENOUS STENOSIS THROUGH NEPHROLOGY TEAM  - ? s/p STENT PLACEMENT    # ANEMIA OF CKD  # HX OF PANCYTOPENIA   - TREND HGB, TRANSFUSION THRESHOLD HGB < 7  - TYPE AND SCREEN  - ON EPO    - PPI BID, CARAFATE QID    # HX OF COPD  # PULMONARY HYPERTENSION    # SEVERE PROTEIN CALORIE MALNUTRITION, FAILURE TO THRIVE   -  TEMPORAL WASTING, LOSS OF MUSCLE MASS FROM SHOULDER AND HIP GIRDLE  - NUTRITIONAL SUPPLEMENT    # HLD  # PARTIALLY BLIND  # S/P LEFT BKA  # HX OF PVD  # LS SPINAL STENOSIS  # GI AND DVT PPX.

## 2023-12-04 NOTE — PROGRESS NOTE ADULT - PROBLEM SELECTOR PLAN 1
12/13 Pt had 9 episodes of diarrhea within the ast 24 hours. non bloody BM. Afbrile, no n/v, abdominal pain.  wbc: low, not elevated from pt's baseline  Diet switched to low fiber DASH  IVF for fluid loss 2/2 diarrhea  12/13 Started ciprofloxacin and flagyl 500mg TID  - cipro 250mg dosed once due to pt. cr cl. < 29. To be dosed post HD session. Next HD session 12/4.  - flagyl 500mg tid.  C. diff, gi pcr negative  - f/u O&P and stool culture 12/3 Pt had 9 episodes of diarrhea within the last 24 hours. non bloody BM. Afebrile, no n/v, abdominal pain.  Diet switched to low fiber DASH  IVF for fluid loss 2/2 diarrhea  12/3 Started ciprofloxacin and flagyl 500mg TID  - cipro 250mg dosed once due to pt. cr cl. < 29. To be dosed post HD session. Next HD session 12/4.  C. diff, gi pcr negative  12/4 s/p Loperamide 2mg x 1 dose   - f/u O&P and stool culture  - c/w Ciprofloxacin and Flagyl

## 2023-12-04 NOTE — PROGRESS NOTE ADULT - SUBJECTIVE AND OBJECTIVE BOX
PGY-1 Progress Note discussed with attending    PAGER #: [[phone]] TILL 5:00 PM  PLEASE CONTACT ON CALL TEAM:  - On Call Team (Please refer to Dejon) FROM 5:00 PM - 8:30PM  - Nightfloat Team FROM 8:30 -7:30 AM    INTERVAL HPI/OVERNIGHT EVENTS: Pt was examined this morning at bedside and he is in NAD at the moment. Pt reports he had 2 bowel movements today, which was less than yesterday, He denies; fever, chills/sweat,  body aches, nausea, bloody stool, abdominal pain, N/V    REVIEW OF SYSTEMS:  CONSTITUTIONAL: No fever, chills,  or fatigue  RESPIRATORY: No cough, wheezing, No shortness of breath  CARDIOVASCULAR: No chest pain, palpitations,  leg swelling  GASTROINTESTINAL: + diarrhea, No abdominal pain. No nausea, vomiting, or hematemesis; No constipation. No bloody or black stool  GENITOURINARY: No dysuria or hematuria, urinary frequency  NEUROLOGICAL: No headaches, dizziness  SKIN: No itching, burning, rashes, or lesions     Vital Signs Last 24 Hrs  T(C): 36.7 (04 Dec 2023 05:59), Max: 36.7 (04 Dec 2023 05:59)  T(F): 98.1 (04 Dec 2023 05:59), Max: 98.1 (04 Dec 2023 05:59)  HR: 88 (04 Dec 2023 05:59) (87 - 88)  BP: 148/72 (04 Dec 2023 05:59) (148/72 - 151/73)  BP(mean): --  RR: 18 (04 Dec 2023 05:59) (18 - 19)  SpO2: 95% (04 Dec 2023 05:59) (94% - 95%)    Parameters below as of 04 Dec 2023 05:59  Patient On (Oxygen Delivery Method): room air        PHYSICAL EXAMINATION:  GENERAL: NAD,   HEAD:  Atraumatic, Normocephalic  EYES:  PERRLA,  conjunctiva and sclera clear  NECK: Supple, No JVD,   CHEST/LUNG: Clear to auscultation. Clear to percussion bilaterally; No rales, rhonchi, wheezing, or rubs  HEART: Regular rate and rhythm; No murmurs, rubs, or gallops  ABDOMEN: Soft, Nontender, Nondistended; Bowel sounds present, No Rovsing's sign   NERVOUS SYSTEM:  Alert & Oriented X3,    EXTREMITIES: + LT BKA,  No clubbing, cyanosis, or edema  CALF: No Calf tenderness   SKIN: warm dry                      CAPILLARY BLOOD GLUCOSE      RADIOLOGY & ADDITIONAL TESTS:                   PGY-1 Progress Note discussed with attending    PAGER #: [[phone]] TILL 5:00 PM  PLEASE CONTACT ON CALL TEAM:  - On Call Team (Please refer to Dejon) FROM 5:00 PM - 8:30PM  - Nightfloat Team FROM 8:30 -7:30 AM    INTERVAL HPI/OVERNIGHT EVENTS: Pt was examined this morning at bedside and he is in NAD at the moment. Pt reports he had 2 bowel movements today, which was less than yesterday, He denies; fever, chills/sweat,  body aches, nausea, bloody stool, abdominal pain, N/V    REVIEW OF SYSTEMS:  CONSTITUTIONAL: No fever, chills,  or fatigue  RESPIRATORY: No cough, wheezing, No shortness of breath  CARDIOVASCULAR: No chest pain, palpitations,  leg swelling  GASTROINTESTINAL: + diarrhea, No abdominal pain. No nausea, vomiting, or hematemesis; No constipation. No bloody or black stool  GENITOURINARY: No dysuria or hematuria, urinary frequency  NEUROLOGICAL: No headaches, dizziness  SKIN: No itching, burning, rashes, or lesions     Vital Signs Last 24 Hrs  T(C): 36.7 (04 Dec 2023 05:59), Max: 36.7 (04 Dec 2023 05:59)  T(F): 98.1 (04 Dec 2023 05:59), Max: 98.1 (04 Dec 2023 05:59)  HR: 88 (04 Dec 2023 05:59) (87 - 88)  BP: 148/72 (04 Dec 2023 05:59) (148/72 - 151/73)  BP(mean): --  RR: 18 (04 Dec 2023 05:59) (18 - 19)  SpO2: 95% (04 Dec 2023 05:59) (94% - 95%)    Parameters below as of 04 Dec 2023 05:59  Patient On (Oxygen Delivery Method): room air        PHYSICAL EXAMINATION:  GENERAL: NAD,   HEAD:  Atraumatic, Normocephalic  EYES:  PERRLA,  conjunctiva and sclera clear  NECK: Supple, No JVD,   CHEST/LUNG: Clear to auscultation. Clear to percussion bilaterally; No rales, rhonchi, wheezing, or rubs  HEART: Regular rate and rhythm; No murmurs, rubs, or gallops  ABDOMEN: Soft, Nontender, Nondistended; Bowel sounds present, No Rovsing's sign   NERVOUS SYSTEM:  Alert & Oriented X3,    EXTREMITIES: + LT BKA,  No clubbing, cyanosis, or edema  CALF: No Calf tenderness   SKIN: warm dry                      CAPILLARY BLOOD GLUCOSE      RADIOLOGY & ADDITIONAL TESTS:

## 2023-12-04 NOTE — PROGRESS NOTE ADULT - PROBLEM SELECTOR PLAN 4
K stable, mental status stable, no urgent need for dialysis at this time. HD Schedule: Tue/Thur/Sat  Location: [place]. Last Session: 12/1 after admission.   Access: Left AVF on forearm.   next HD 12/4  Social Work Consult for Reinstatement of HD  started epogen on HD and sevelamer.  CHG Ordered  Hepatitis Panel: NR K stable, mental status stable, no urgent need for dialysis at this time. HD Schedule: Tue/Thur/Sat  Location: [facility]. Last Session: 12/1 after admission.   Access: Left AVF on forearm.   next HD 12/4  Social Work Consult for Reinstatement of HD  started epogen on HD and sevelamer.  CHG Ordered  Hepatitis Panel: NR K stable, mental status stable, no urgent need for dialysis at this time. HD Schedule: Tue/Thur/Sat  Location: [facility]. Last Session: 12/1 after admission.   Access: Left AVF on forearm.   next HD 12/4  Social Work Consult for Reinstatement of HD  Started sevelamer.  CHG Ordered  Hepatitis Panel: NR K stable, mental status stable, no urgent need for dialysis at this time. HD Schedule: Tue/Thur/Sat  Location: [facility]. Last Session: 12/1 after admission.   Access: Left AVF on forearm.   next HD 12/4  Social Work Consult for Reinstatement of HD  Started sevelamer.  CHG Ordered  Hepatitis Panel: NR

## 2023-12-04 NOTE — PROGRESS NOTE ADULT - SUBJECTIVE AND OBJECTIVE BOX
Interval Events:  pt in nad    Allergies    No Known Drug Allergies  fish (Rash)  liver (Anaphylaxis)    Intolerances      Endocrine/Metabolic Medications:  atorvastatin 40 milliGRAM(s) Oral at bedtime  levothyroxine 25 MICROGram(s) Oral daily      Vital Signs Last 24 Hrs  T(C): 36.7 (04 Dec 2023 05:59), Max: 36.7 (04 Dec 2023 05:59)  T(F): 98.1 (04 Dec 2023 05:59), Max: 98.1 (04 Dec 2023 05:59)  HR: 88 (04 Dec 2023 05:59) (87 - 88)  BP: 148/72 (04 Dec 2023 05:59) (148/72 - 151/73)  BP(mean): --  RR: 18 (04 Dec 2023 05:59) (18 - 19)  SpO2: 95% (04 Dec 2023 05:59) (94% - 95%)    Parameters below as of 04 Dec 2023 05:59  Patient On (Oxygen Delivery Method): room air        Weight (kg): 74.5 (12-03 @ 12:25)    PHYSICAL EXAM  All physical exam findings normal, except those marked:  General:	Alert, active, cooperative, NAD, well hydrated  .		[] Abnormal:  Neck		Normal: supple, no cervical adenopathy, no palpable thyroid  .		[] Abnormal:  Cardiovascular	Normal: regular rate, normal S1, S2, no murmurs  .		[] Abnormal:  Respiratory	Normal: no chest wall deformity, normal respiratory pattern, CTA B/L  .		[] Abnormal:  Abdominal	Normal: soft, ND, NT, bowel sounds present, no masses, no organomegaly  .		[] Abnormal:  		Normal normal genitalia, testes descended, circumcised/uncircumcised  .		Juan Luis stage:			Breast juan luis:  .		Menstrual history:  .		[] Abnormal:  Extremities	Normal: FROM x4  .		[] Abnormal:  Skin		Normal: intact and not indurated, no rash, no acanthosis nigricans  .		[] Abnormal:  Neurologic	Normal: grossly intact  .		[] Abnormal:    LABS        CAPILLARY BLOOD GLUCOSE      POCT Blood Glucose.: 179 mg/dL (04 Dec 2023 08:13)  POCT Blood Glucose.: 140 mg/dL (03 Dec 2023 21:22)  POCT Blood Glucose.: 109 mg/dL (03 Dec 2023 16:43)  POCT Blood Glucose.: 151 mg/dL (03 Dec 2023 11:36)        Assesment/plan    63M visually imparied with history of ESRD on dialysis T/TH/sat, last dialysis on 11/28  HTN, NIDDM, hypothyroidism, GERD, COPD, presents from [facility] with report of coffee-ground emesis  Found to have rec hypoglycemia. Admits to eating ok. Denies prev symptoms of hypoglycemia          Problem/Recommendation - 1:  ·  Problem: DM (diabetes mellitus).   ·  Recommendation: with s/p severe hypoglycemia  now resolved  off of all insulin   fsg ac and hs  feeding with asist  check a1c level.     Problem/Recommendation - 2:  ·  Problem: Hematemesis.   ·  Recommendation: tx per GI/prim team.     Interval Events:  pt in nad    Allergies    No Known Drug Allergies  fish (Rash)  liver (Anaphylaxis)    Intolerances      Endocrine/Metabolic Medications:  atorvastatin 40 milliGRAM(s) Oral at bedtime  levothyroxine 25 MICROGram(s) Oral daily      Vital Signs Last 24 Hrs  T(C): 36.7 (04 Dec 2023 05:59), Max: 36.7 (04 Dec 2023 05:59)  T(F): 98.1 (04 Dec 2023 05:59), Max: 98.1 (04 Dec 2023 05:59)  HR: 88 (04 Dec 2023 05:59) (87 - 88)  BP: 148/72 (04 Dec 2023 05:59) (148/72 - 151/73)  BP(mean): --  RR: 18 (04 Dec 2023 05:59) (18 - 19)  SpO2: 95% (04 Dec 2023 05:59) (94% - 95%)    Parameters below as of 04 Dec 2023 05:59  Patient On (Oxygen Delivery Method): room air        Weight (kg): 74.5 (12-03 @ 12:25)    PHYSICAL EXAM  All physical exam findings normal, except those marked:  General:	Alert, active, cooperative, NAD, well hydrated  .		[] Abnormal:  Neck		Normal: supple, no cervical adenopathy, no palpable thyroid  .		[] Abnormal:  Cardiovascular	Normal: regular rate, normal S1, S2, no murmurs  .		[] Abnormal:  Respiratory	Normal: no chest wall deformity, normal respiratory pattern, CTA B/L  .		[] Abnormal:  Abdominal	Normal: soft, ND, NT, bowel sounds present, no masses, no organomegaly  .		[] Abnormal:  		Normal normal genitalia, testes descended, circumcised/uncircumcised  .		Juan Luis stage:			Breast juan luis:  .		Menstrual history:  .		[] Abnormal:  Extremities	Normal: FROM x4  .		[] Abnormal:  Skin		Normal: intact and not indurated, no rash, no acanthosis nigricans  .		[] Abnormal:  Neurologic	Normal: grossly intact  .		[] Abnormal:    LABS        CAPILLARY BLOOD GLUCOSE      POCT Blood Glucose.: 179 mg/dL (04 Dec 2023 08:13)  POCT Blood Glucose.: 140 mg/dL (03 Dec 2023 21:22)  POCT Blood Glucose.: 109 mg/dL (03 Dec 2023 16:43)  POCT Blood Glucose.: 151 mg/dL (03 Dec 2023 11:36)        Assesment/plan    63M visually imparied with history of ESRD on dialysis T/TH/sat, last dialysis on 11/28  HTN, NIDDM, hypothyroidism, GERD, COPD, presents from [facility] with report of coffee-ground emesis  Found to have rec hypoglycemia. Admits to eating ok. Denies prev symptoms of hypoglycemia          Problem/Recommendation - 1:  ·  Problem: DM (diabetes mellitus).   ·  Recommendation: with s/p severe hypoglycemia  now resolved  off of all insulin   fsg ac and hs  feeding with asist  check a1c level.     Problem/Recommendation - 2:  ·  Problem: Hematemesis.   ·  Recommendation: tx per GI/prim team.

## 2023-12-05 NOTE — PROGRESS NOTE ADULT - SUBJECTIVE AND OBJECTIVE BOX
[facility] : Progress Note :: [phone], (office [phone]),   Dr Mosher / Dr Washington / Dr Young / Dr Doll / Dr Varsha TURCIOS / Dr Tello / Dr Garcia / Dr Larry qiu  _____________________________________________________________________________________________    ESRD, last HD Monday     No Known Drug Allergies  fish (Rash)  liver (Anaphylaxis)    Hospital Medications:   MEDICATIONS  (STANDING):  amLODIPine   Tablet 10 milliGRAM(s) Oral daily  atorvastatin 40 milliGRAM(s) Oral at bedtime  budesonide 160 MICROgram(s)/formoterol 4.5 MICROgram(s) Inhaler 2 Puff(s) Inhalation two times a day  chlorhexidine 2% Cloths 1 Application(s) Topical <User Schedule>  ciprofloxacin     Tablet 500 milliGRAM(s) Oral every 24 hours  dextrose 5%. 1000 milliLiter(s) (50 mL/Hr) IV Continuous <Continuous>  dextrose 5%. 1000 milliLiter(s) (100 mL/Hr) IV Continuous <Continuous>  dextrose 50% Injectable 25 Gram(s) IV Push once  dextrose 50% Injectable 12.5 Gram(s) IV Push once  dextrose 50% Injectable 25 Gram(s) IV Push once  epoetin beverley (PROCRIT) Injectable 70592 Unit(s) IV Push <User Schedule>  folic acid 1 milliGRAM(s) Oral daily  glucagon  Injectable 1 milliGRAM(s) IntraMuscular once  heparin   Injectable 5000 Unit(s) SubCutaneous every 12 hours  hydrALAZINE 25 milliGRAM(s) Oral three times a day  insulin lispro (ADMELOG) corrective regimen sliding scale   SubCutaneous three times a day before meals  insulin lispro (ADMELOG) corrective regimen sliding scale   SubCutaneous at bedtime  latanoprost 0.005% Ophthalmic Solution 1 Drop(s) Both EYES at bedtime  levothyroxine 25 MICROGram(s) Oral daily  LORazepam     Tablet 2 milliGRAM(s) Oral <User Schedule>  metoprolol tartrate 25 milliGRAM(s) Oral two times a day  metroNIDAZOLE    Tablet 500 milliGRAM(s) Oral every 8 hours  pantoprazole  Injectable 40 milliGRAM(s) IV Push every 12 hours  sertraline 200 milliGRAM(s) Oral daily  sevelamer carbonate 800 milliGRAM(s) Oral three times a day with meals  sucralfate 1 Gram(s) Oral every 6 hours        VITALS:  T(F): 97.8 (12-05-23 @ 13:36), Max: 97.9 (12-05-23 @ 02:25)  HR: 83 (12-05-23 @ 13:36)  BP: 120/66 (12-05-23 @ 13:36)  RR: 16 (12-05-23 @ 13:36)  SpO2: 97% (12-05-23 @ 13:36)  Wt(kg): --      PHYSICAL EXAM:  Constitutional: NAD  HEENT: anicteric sclera, oropharynx clear,  Neck: No JVD  Respiratory: CTAB, no wheezes, rales or rhonchi  Cardiovascular: S1, S2, RRR  Gastrointestinal: BS+, soft, NT/ND  Extremities: No peripheral edema  Neurological: A/O x 3, no focal deficits  Vascular Access: AVF with thrill and bruit     LABS:        Creatinine Trend: 8.70 <--, 13.30 <--, 12.70 <--    Urine Studies:  Urinalysis Basic - ( 02 Dec 2023 01:50 )    Color:  / Appearance:  / SG:  / pH:   Gluc: 53 mg/dL / Ketone:   / Bili:  / Urobili:    Blood:  / Protein:  / Nitrite:    Leuk Esterase:  / RBC:  / WBC    Sq Epi:  / Non Sq Epi:  / Bacteria:         RADIOLOGY & ADDITIONAL STUDIES:   [facility] : Progress Note :: [phone], (office [phone]),   Dr Mosher / Dr Washington / Dr Yuong / Dr Doll / Dr Varsha TURCIOS / Dr Tello / Dr Garcia / Dr Larry qiu  _____________________________________________________________________________________________    ESRD, last HD Monday     No Known Drug Allergies  fish (Rash)  liver (Anaphylaxis)    Hospital Medications:   MEDICATIONS  (STANDING):  amLODIPine   Tablet 10 milliGRAM(s) Oral daily  atorvastatin 40 milliGRAM(s) Oral at bedtime  budesonide 160 MICROgram(s)/formoterol 4.5 MICROgram(s) Inhaler 2 Puff(s) Inhalation two times a day  chlorhexidine 2% Cloths 1 Application(s) Topical <User Schedule>  ciprofloxacin     Tablet 500 milliGRAM(s) Oral every 24 hours  dextrose 5%. 1000 milliLiter(s) (50 mL/Hr) IV Continuous <Continuous>  dextrose 5%. 1000 milliLiter(s) (100 mL/Hr) IV Continuous <Continuous>  dextrose 50% Injectable 25 Gram(s) IV Push once  dextrose 50% Injectable 12.5 Gram(s) IV Push once  dextrose 50% Injectable 25 Gram(s) IV Push once  epoetin beverley (PROCRIT) Injectable 74706 Unit(s) IV Push <User Schedule>  folic acid 1 milliGRAM(s) Oral daily  glucagon  Injectable 1 milliGRAM(s) IntraMuscular once  heparin   Injectable 5000 Unit(s) SubCutaneous every 12 hours  hydrALAZINE 25 milliGRAM(s) Oral three times a day  insulin lispro (ADMELOG) corrective regimen sliding scale   SubCutaneous three times a day before meals  insulin lispro (ADMELOG) corrective regimen sliding scale   SubCutaneous at bedtime  latanoprost 0.005% Ophthalmic Solution 1 Drop(s) Both EYES at bedtime  levothyroxine 25 MICROGram(s) Oral daily  LORazepam     Tablet 2 milliGRAM(s) Oral <User Schedule>  metoprolol tartrate 25 milliGRAM(s) Oral two times a day  metroNIDAZOLE    Tablet 500 milliGRAM(s) Oral every 8 hours  pantoprazole  Injectable 40 milliGRAM(s) IV Push every 12 hours  sertraline 200 milliGRAM(s) Oral daily  sevelamer carbonate 800 milliGRAM(s) Oral three times a day with meals  sucralfate 1 Gram(s) Oral every 6 hours        VITALS:  T(F): 97.8 (12-05-23 @ 13:36), Max: 97.9 (12-05-23 @ 02:25)  HR: 83 (12-05-23 @ 13:36)  BP: 120/66 (12-05-23 @ 13:36)  RR: 16 (12-05-23 @ 13:36)  SpO2: 97% (12-05-23 @ 13:36)  Wt(kg): --      PHYSICAL EXAM:  Constitutional: NAD  HEENT: anicteric sclera, oropharynx clear,  Neck: No JVD  Respiratory: CTAB, no wheezes, rales or rhonchi  Cardiovascular: S1, S2, RRR  Gastrointestinal: BS+, soft, NT/ND  Extremities: No peripheral edema  Neurological: A/O x 3, no focal deficits  Vascular Access: AVF with thrill and bruit     LABS:        Creatinine Trend: 8.70 <--, 13.30 <--, 12.70 <--    Urine Studies:  Urinalysis Basic - ( 02 Dec 2023 01:50 )    Color:  / Appearance:  / SG:  / pH:   Gluc: 53 mg/dL / Ketone:   / Bili:  / Urobili:    Blood:  / Protein:  / Nitrite:    Leuk Esterase:  / RBC:  / WBC    Sq Epi:  / Non Sq Epi:  / Bacteria:         RADIOLOGY & ADDITIONAL STUDIES:

## 2023-12-05 NOTE — PROGRESS NOTE ADULT - SUBJECTIVE AND OBJECTIVE BOX
Interval Events:      Allergies    No Known Drug Allergies  fish (Rash)  liver (Anaphylaxis)    Intolerances      Endocrine/Metabolic Medications:  atorvastatin 40 milliGRAM(s) Oral at bedtime  levothyroxine 25 MICROGram(s) Oral daily      Vital Signs Last 24 Hrs  T(C): 36.3 (05 Dec 2023 04:50), Max: 37.1 (04 Dec 2023 13:38)  T(F): 97.4 (05 Dec 2023 04:50), Max: 98.7 (04 Dec 2023 13:38)  HR: 91 (05 Dec 2023 07:15) (91 - 100)  BP: 150/74 (05 Dec 2023 07:15) (135/71 - 166/75)  BP(mean): --  RR: 18 (05 Dec 2023 04:50) (17 - 18)  SpO2: 97% (05 Dec 2023 04:50) (95% - 99%)    Parameters below as of 05 Dec 2023 04:50  Patient On (Oxygen Delivery Method): room air          PHYSICAL EXAM  All physical exam findings normal, except those marked:  General:	Alert, active, cooperative, NAD, well hydrated  .		[] Abnormal:  Neck		Normal: supple, no cervical adenopathy, no palpable thyroid  .		[] Abnormal:  Cardiovascular	Normal: regular rate, normal S1, S2, no murmurs  .		[] Abnormal:  Respiratory	Normal: no chest wall deformity, normal respiratory pattern, CTA B/L  .		[] Abnormal:  Abdominal	Normal: soft, ND, NT, bowel sounds present, no masses, no organomegaly  .		[] Abnormal:  		Normal normal genitalia, testes descended, circumcised/uncircumcised  .		Juan Luis stage:			Breast juan luis:  .		Menstrual history:  .		[] Abnormal:  Extremities	Normal: FROM x4  .		[] Abnormal:  Skin		Normal: intact and not indurated, no rash, no acanthosis nigricans  .		[] Abnormal:  Neurologic	Normal: grossly intact  .		[] Abnormal:    LABS        CAPILLARY BLOOD GLUCOSE            Assesment/plan       Interval Events:  pt in nad    Allergies    No Known Drug Allergies  fish (Rash)  liver (Anaphylaxis)    Intolerances      Endocrine/Metabolic Medications:  atorvastatin 40 milliGRAM(s) Oral at bedtime  levothyroxine 25 MICROGram(s) Oral daily      Vital Signs Last 24 Hrs  T(C): 36.3 (05 Dec 2023 04:50), Max: 37.1 (04 Dec 2023 13:38)  T(F): 97.4 (05 Dec 2023 04:50), Max: 98.7 (04 Dec 2023 13:38)  HR: 91 (05 Dec 2023 07:15) (91 - 100)  BP: 150/74 (05 Dec 2023 07:15) (135/71 - 166/75)  BP(mean): --  RR: 18 (05 Dec 2023 04:50) (17 - 18)  SpO2: 97% (05 Dec 2023 04:50) (95% - 99%)    Parameters below as of 05 Dec 2023 04:50  Patient On (Oxygen Delivery Method): room air          PHYSICAL EXAM  All physical exam findings normal, except those marked:  General:	Alert, active, cooperative, NAD, well hydrated  .		[] Abnormal:  Neck		Normal: supple, no cervical adenopathy, no palpable thyroid  .		[] Abnormal:  Cardiovascular	Normal: regular rate, normal S1, S2, no murmurs  .		[] Abnormal:  Respiratory	Normal: no chest wall deformity, normal respiratory pattern, CTA B/L  .		[] Abnormal:  Abdominal	Normal: soft, ND, NT, bowel sounds present, no masses, no organomegaly  .		[] Abnormal:  		Normal normal genitalia, testes descended, circumcised/uncircumcised  .		Juan Luis stage:			Breast juan luis:  .		Menstrual history:  .		[] Abnormal:  Extremities	Normal: FROM x4  .		[] Abnormal:  Skin		Normal: intact and not indurated, no rash, no acanthosis nigricans  .		[] Abnormal:  Neurologic	Normal: grossly intact  .		[] Abnormal:    LABS        CAPILLARY BLOOD GLUCOSE            Assesment/plan    63M visually imparied with history of ESRD on dialysis T/TH/sat, last dialysis on 11/28  HTN, NIDDM, hypothyroidism, GERD, COPD, presents from [facility] with report of coffee-ground emesis  Found to have rec hypoglycemia. Admits to eating ok. Denies prev symptoms of hypoglycemia          Problem/Recommendation - 1:  ·  Problem: DM (diabetes mellitus).   ·  Recommendation: with s/p severe hypoglycemia  now resolved  off of all insulin   fsg ac and hs  feeding with asist       Interval Events:  pt in nad    Allergies    No Known Drug Allergies  fish (Rash)  liver (Anaphylaxis)    Intolerances      Endocrine/Metabolic Medications:  atorvastatin 40 milliGRAM(s) Oral at bedtime  levothyroxine 25 MICROGram(s) Oral daily      Vital Signs Last 24 Hrs  T(C): 36.3 (05 Dec 2023 04:50), Max: 37.1 (04 Dec 2023 13:38)  T(F): 97.4 (05 Dec 2023 04:50), Max: 98.7 (04 Dec 2023 13:38)  HR: 91 (05 Dec 2023 07:15) (91 - 100)  BP: 150/74 (05 Dec 2023 07:15) (135/71 - 166/75)  BP(mean): --  RR: 18 (05 Dec 2023 04:50) (17 - 18)  SpO2: 97% (05 Dec 2023 04:50) (95% - 99%)    Parameters below as of 05 Dec 2023 04:50  Patient On (Oxygen Delivery Method): room air          PHYSICAL EXAM  All physical exam findings normal, except those marked:  General:	Alert, active, cooperative, NAD, well hydrated  .		[] Abnormal:  Neck		Normal: supple, no cervical adenopathy, no palpable thyroid  .		[] Abnormal:  Cardiovascular	Normal: regular rate, normal S1, S2, no murmurs  .		[] Abnormal:  Respiratory	Normal: no chest wall deformity, normal respiratory pattern, CTA B/L  .		[] Abnormal:  Abdominal	Normal: soft, ND, NT, bowel sounds present, no masses, no organomegaly  .		[] Abnormal:  		Normal normal genitalia, testes descended, circumcised/uncircumcised  .		Juan Luis stage:			Breast juan luis:  .		Menstrual history:  .		[] Abnormal:  Extremities	Normal: FROM x4  .		[] Abnormal:  Skin		Normal: intact and not indurated, no rash, no acanthosis nigricans  .		[] Abnormal:  Neurologic	Normal: grossly intact  .		[] Abnormal:    LABS        CAPILLARY BLOOD GLUCOSE            Assesment/plan    63M visually imparied with history of ESRD on dialysis T/TH/sat, last dialysis on 11/28  HTN, NIDDM, hypothyroidism, GERD, COPD, presents from [facility] with report of coffee-ground emesis  Found to have rec hypoglycemia. Admits to eating ok. Denies prev symptoms of hypoglycemia          Problem/Recommendation - 1:  ·  Problem: DM (diabetes mellitus).   ·  Recommendation: with s/p severe hypoglycemia  now resolved  off of all insulin   fsg ac and hs  feeding with asist

## 2023-12-05 NOTE — PROGRESS NOTE ADULT - ASSESSMENT
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 on HD TTS as outpt. HD in AM   D/C planning in AM   # anemia of CKD and Gastrointestinal bleed.  epogen on HD. Transfuse for HBG <7.   #CKDMBD. cont sevelamer  # HTN. blood pressure at goal       62 year old male from [facility], walks with RW, with PMH of ESRD on HD (TTS), BKA- L w/prosthetic leg, DM, Left eye blindness, CHF, CAD, HTN, HLD, osteoporosis, bronchitis, current smoker, and anemia who presents with complaint of coffee ground emesis    # ESRD. on HD TTS as outpt. HD in AM   D/C planning in AM   # anemia of CKD and Gastrointestinal bleed.  epogen on HD. Transfuse for HBG <7.   #CKDMBD. cont sevelamer  # HTN. blood pressure at goal

## 2023-12-05 NOTE — PROGRESS NOTE ADULT - SUBJECTIVE AND OBJECTIVE BOX
Patient is a 63y old  Male who presents with a chief complaint of Hematemesis (04 Dec 2023 17:51)    PATIENT IS SEEN AND EXAMINED IN MEDICAL FLOOR.  MARIIT [    ]    JEREMY [   ]      GT [   ]    ALLERGIES:  No Known Drug Allergies  fish (Rash)  liver (Anaphylaxis)      Daily     Daily Weight in k (04 Dec 2023 15:34)    VITALS:    Vital Signs Last 24 Hrs  T(C): 36.3 (05 Dec 2023 04:50), Max: 37.1 (04 Dec 2023 13:38)  T(F): 97.4 (05 Dec 2023 04:50), Max: 98.7 (04 Dec 2023 13:38)  HR: 91 (05 Dec 2023 07:15) (91 - 100)  BP: 150/74 (05 Dec 2023 07:15) (135/71 - 166/75)  BP(mean): --  RR: 18 (05 Dec 2023 04:50) (17 - 18)  SpO2: 97% (05 Dec 2023 04:50) (95% - 99%)    Parameters below as of 05 Dec 2023 04:50  Patient On (Oxygen Delivery Method): room air        LABS:              CAPILLARY BLOOD GLUCOSE              Creatinine Trend: 8.70<--, 13.30<--, 12.70<--  I&O's Summary          .Stool Feces   @ 14:40   No enteric pathogens isolated.  (Stool culture examined for Salmonella,  Shigella, Campylobacter, Aeromonas, Plesiomonas,  Vibrio, E.coli O157 and Yersinia)  --  --          MEDICATIONS:    MEDICATIONS  (STANDING):  amLODIPine   Tablet 10 milliGRAM(s) Oral daily  atorvastatin 40 milliGRAM(s) Oral at bedtime  budesonide 160 MICROgram(s)/formoterol 4.5 MICROgram(s) Inhaler 2 Puff(s) Inhalation two times a day  chlorhexidine 2% Cloths 1 Application(s) Topical <User Schedule>  ciprofloxacin     Tablet 500 milliGRAM(s) Oral every 24 hours  epoetin beverley (PROCRIT) Injectable 51512 Unit(s) IV Push <User Schedule>  folic acid 1 milliGRAM(s) Oral daily  heparin   Injectable 5000 Unit(s) SubCutaneous every 12 hours  hydrALAZINE 25 milliGRAM(s) Oral three times a day  latanoprost 0.005% Ophthalmic Solution 1 Drop(s) Both EYES at bedtime  levothyroxine 25 MICROGram(s) Oral daily  LORazepam     Tablet 2 milliGRAM(s) Oral <User Schedule>  metoprolol tartrate 25 milliGRAM(s) Oral two times a day  metroNIDAZOLE    Tablet 500 milliGRAM(s) Oral every 8 hours  pantoprazole  Injectable 40 milliGRAM(s) IV Push every 12 hours  sertraline 200 milliGRAM(s) Oral daily  sevelamer carbonate 800 milliGRAM(s) Oral three times a day with meals  sucralfate 1 Gram(s) Oral every 6 hours      MEDICATIONS  (PRN):  acetaminophen     Tablet .. 650 milliGRAM(s) Oral every 6 hours PRN Temp greater or equal to 38C (100.4F), Mild Pain (1 - 3)  albuterol    90 MICROgram(s) HFA Inhaler 2 Puff(s) Inhalation every 6 hours PRN Bronchospasm  guaiFENesin Oral Liquid (Sugar-Free) 10 milliGRAM(s) Oral every 8 hours PRN Cough  melatonin 3 milliGRAM(s) Oral at bedtime PRN Insomnia  ondansetron Injectable 4 milliGRAM(s) IV Push every 8 hours PRN Nausea and/or Vomiting      REVIEW OF SYSTEMS:                           ALL ROS DONE [ X   ]    CONSTITUTIONAL:  LETHARGIC [   ], FEVER [   ], UNRESPONSIVE [   ]  CVS:  CP  [   ], SOB, [   ], PALPITATIONS [   ], DIZZYNESS [   ]  RS: COUGH [   ], SPUTUM [   ]  GI: ABDOMINAL PAIN [   ], NAUSEA [   ], VOMITINGS [   ], DIARRHEA [   ], CONSTIPATION [   ]  :  DYSURIA [   ], NOCTURIA [   ], INCREASED FREQUENCY [   ], DRIBLING [   ],  SKELETAL: PAINFUL JOINTS [   ], SWOLLEN JOINTS [   ], NECK ACHE [   ], LOW BACK ACHE [   ],  SKIN : ULCERS [   ], RASH [   ], ITCHING [   ]  CNS: HEAD ACHE [   ], DOUBLE VISION [   ], BLURRED VISION [   ], AMS / CONFUSION [   ], SEIZURES [   ], WEAKNESS [   ],TINGLING / NUMBNESS [   ]      PHYSICAL EXAMINATION:  GENERAL APPEARANCE: NO DISTRESS  HEENT:  NO PALLOR, NO  JVD,  NO   NODES, NECK SUPPLE  CVS: S1 +, S2 +,   RS: AEEB,  OCCASIONAL  RALES +,   NO RONCHI  ABD: SOFT, NT, NO, BS +  EXT: NO PE  SKIN: WARM,   SKELETAL:  LEFT BKA +  CNS:  AAO X 3    RADIOLOGY :      ASSESSMENT :     Anemia    No pertinent past medical history    Hypertension    Adrenal insufficiency    CKD (chronic kidney disease)    Anemia    Glaucoma    Coronary artery disease    HLD (hyperlipidemia)    Peripheral vascular disease    Spinal stenosis of lumbosacral region    Hyperparathyroidism    Diabetes mellitus    Diabetic neuropathy    Contracture of hand    Osteoarthritis    Osteoporosis    Vision loss of left eye    ESRD on hemodialysis    Cataract    BPH (benign prostatic hyperplasia)    UTI (urinary tract infection)    Bladder mass    H/O hematuria    Osteoporosis    Vision loss of right eye    Depression    Chronic GERD    Osteomyelitis of vertebra    CHF (congestive heart failure)    No significant past surgical history    Below knee amputation status, left    History of right cataract extraction    History of left cataract extraction    S/P arteriovenous (AV) fistula creation    H/O hematuria    H/O transurethral destruction of bladder lesion    History of excision of mass        PLAN:  HPI:  63M visually imparied with history of ESRD on dialysis T//sat, last dialysis on   HTN, NIDDM, hypothyroidism, GERD, COPD, presents from [facility] with report of coffee-ground emesis X3 this AM. Patient uncooperative with rest of history constantly demands food, denies any other complaints.    Denies HA, CP, SOB, NVD    In the ED, Hgb 8, no emesis reported, patient tolerated trial of ice chips CT pending, 2mg ativan given by ED (2023 23:36)    # PATIENT INTERMITTENTLY REFUSING MEDICATIONS AND REFUSED TO COMPLETE - COUNSELLED PATIENT AT LENGTH AND D/W PATIENT RISKS OF DEFERRING INCLUDE BUT ARE NOT LIMITED TO WORSENING PATHOLOGY, ARRHYTHMIA, RESPIRATORY DISTRESS, WORSENING INFECTION AND DEATH. PATIENT VERBALIZED UNDERSTANDING.    # DIARRHEA  - F/U STOOL STUDIES  - CIPROFLOXACIN + FLAGYLL, F/U BCX   - MONITORING CLOSELY  - GI CONSULT    # ELEVATED LACTIC ACID  - TREND    # RECURRENT INTRACTABLE NAUSEA/VOMITING, ? COFFEE GROUND EMESIS - IMPROVED  # GASTROPARESIS  # HISTORY OF ESOPHAGITIS AND DUODENITIS [2021], HX OF GASTROPARESIS W/ EVIDENCE OF THICKENED ESOPHAGUS ON PREVIOUS CT SCAN   # PANCREAS W/ DOUBLE DUCT SIGN    - MONITORING HGB, PLACED ON PPI BID , CARAFATE QID  - PRN ANTIEMETICS    - MONITORING FOR SYMPTOMS  - WHILE ON DIET PATIENT REPEATEDLY COUNSELLED TO CHEW FOOD CAUTIOUSLY AND CONSUME SMALL BITES W/ REGULAR CLEARING WITH WATER BETWEEN BITES    - ADVANCE DIET AS TOLERATED  - NOTED CT A/P    - GI CONSULT    # EPISODE OF HYPOGLYCEMIA   # GASTROPARESIS  # UNDERLYING DM  - SSI + FS    - PATIENT WAS RAPID RESPONSE DUE TO HYPOGLYCEMIA - IMPROVED S/P DEXTROSE PUSH, NOW MAINTAINING EUGLYCEMIA    # UNCONTROLLED HTN  # HYPERKALEMIA - IMPROVED  # ESRD ON HD TTS - W/ HX OF NONCOMPLIANCE    - TELEMETRY  - HYDRALAZINE, METOPROLOL AND NORVASC ; PRN IV ANTIHYPERTENSIVE  - LOKELMA  - SUPPLEMENTAL O2  - PLANNED FOR HD  - NEPHROLOGY CONSULT  - PULMONOLOGY CONSULT  - ID CONSULT    # DEPRESSION  - PLACED ON ZOLOFT    # PATIENT UNDERGOING OUTPATIENT WORKUP FOR CENTRAL VENOUS STENOSIS THROUGH NEPHROLOGY TEAM  - ? s/p STENT PLACEMENT    # ANEMIA OF CKD  # HX OF PANCYTOPENIA   - TREND HGB, TRANSFUSION THRESHOLD HGB < 7  - TYPE AND SCREEN  - ON EPO    - PPI BID, CARAFATE QID    # HX OF COPD  # PULMONARY HYPERTENSION    # SEVERE PROTEIN CALORIE MALNUTRITION, FAILURE TO THRIVE   -  TEMPORAL WASTING, LOSS OF MUSCLE MASS FROM SHOULDER AND HIP GIRDLE  - NUTRITIONAL SUPPLEMENT    # HLD  # PARTIALLY BLIND  # S/P LEFT BKA  # HX OF PVD  # LS SPINAL STENOSIS  # GI AND DVT PPX.      Patient is a 63y old  Male who presents with a chief complaint of Hematemesis (04 Dec 2023 17:51)    PATIENT IS SEEN AND EXAMINED IN MEDICAL FLOOR.  MARIIT [    ]    JEREMY [   ]      GT [   ]    ALLERGIES:  No Known Drug Allergies  fish (Rash)  liver (Anaphylaxis)      Daily     Daily Weight in k (04 Dec 2023 15:34)    VITALS:    Vital Signs Last 24 Hrs  T(C): 36.3 (05 Dec 2023 04:50), Max: 37.1 (04 Dec 2023 13:38)  T(F): 97.4 (05 Dec 2023 04:50), Max: 98.7 (04 Dec 2023 13:38)  HR: 91 (05 Dec 2023 07:15) (91 - 100)  BP: 150/74 (05 Dec 2023 07:15) (135/71 - 166/75)  BP(mean): --  RR: 18 (05 Dec 2023 04:50) (17 - 18)  SpO2: 97% (05 Dec 2023 04:50) (95% - 99%)    Parameters below as of 05 Dec 2023 04:50  Patient On (Oxygen Delivery Method): room air        LABS:              CAPILLARY BLOOD GLUCOSE              Creatinine Trend: 8.70<--, 13.30<--, 12.70<--  I&O's Summary          .Stool Feces   @ 14:40   No enteric pathogens isolated.  (Stool culture examined for Salmonella,  Shigella, Campylobacter, Aeromonas, Plesiomonas,  Vibrio, E.coli O157 and Yersinia)  --  --          MEDICATIONS:    MEDICATIONS  (STANDING):  amLODIPine   Tablet 10 milliGRAM(s) Oral daily  atorvastatin 40 milliGRAM(s) Oral at bedtime  budesonide 160 MICROgram(s)/formoterol 4.5 MICROgram(s) Inhaler 2 Puff(s) Inhalation two times a day  chlorhexidine 2% Cloths 1 Application(s) Topical <User Schedule>  ciprofloxacin     Tablet 500 milliGRAM(s) Oral every 24 hours  epoetin beverley (PROCRIT) Injectable 46109 Unit(s) IV Push <User Schedule>  folic acid 1 milliGRAM(s) Oral daily  heparin   Injectable 5000 Unit(s) SubCutaneous every 12 hours  hydrALAZINE 25 milliGRAM(s) Oral three times a day  latanoprost 0.005% Ophthalmic Solution 1 Drop(s) Both EYES at bedtime  levothyroxine 25 MICROGram(s) Oral daily  LORazepam     Tablet 2 milliGRAM(s) Oral <User Schedule>  metoprolol tartrate 25 milliGRAM(s) Oral two times a day  metroNIDAZOLE    Tablet 500 milliGRAM(s) Oral every 8 hours  pantoprazole  Injectable 40 milliGRAM(s) IV Push every 12 hours  sertraline 200 milliGRAM(s) Oral daily  sevelamer carbonate 800 milliGRAM(s) Oral three times a day with meals  sucralfate 1 Gram(s) Oral every 6 hours      MEDICATIONS  (PRN):  acetaminophen     Tablet .. 650 milliGRAM(s) Oral every 6 hours PRN Temp greater or equal to 38C (100.4F), Mild Pain (1 - 3)  albuterol    90 MICROgram(s) HFA Inhaler 2 Puff(s) Inhalation every 6 hours PRN Bronchospasm  guaiFENesin Oral Liquid (Sugar-Free) 10 milliGRAM(s) Oral every 8 hours PRN Cough  melatonin 3 milliGRAM(s) Oral at bedtime PRN Insomnia  ondansetron Injectable 4 milliGRAM(s) IV Push every 8 hours PRN Nausea and/or Vomiting      REVIEW OF SYSTEMS:                           ALL ROS DONE [ X   ]    CONSTITUTIONAL:  LETHARGIC [   ], FEVER [   ], UNRESPONSIVE [   ]  CVS:  CP  [   ], SOB, [   ], PALPITATIONS [   ], DIZZYNESS [   ]  RS: COUGH [   ], SPUTUM [   ]  GI: ABDOMINAL PAIN [   ], NAUSEA [   ], VOMITINGS [   ], DIARRHEA [   ], CONSTIPATION [   ]  :  DYSURIA [   ], NOCTURIA [   ], INCREASED FREQUENCY [   ], DRIBLING [   ],  SKELETAL: PAINFUL JOINTS [   ], SWOLLEN JOINTS [   ], NECK ACHE [   ], LOW BACK ACHE [   ],  SKIN : ULCERS [   ], RASH [   ], ITCHING [   ]  CNS: HEAD ACHE [   ], DOUBLE VISION [   ], BLURRED VISION [   ], AMS / CONFUSION [   ], SEIZURES [   ], WEAKNESS [   ],TINGLING / NUMBNESS [   ]      PHYSICAL EXAMINATION:  GENERAL APPEARANCE: NO DISTRESS  HEENT:  NO PALLOR, NO  JVD,  NO   NODES, NECK SUPPLE  CVS: S1 +, S2 +,   RS: AEEB,  OCCASIONAL  RALES +,   NO RONCHI  ABD: SOFT, NT, NO, BS +  EXT: NO PE  SKIN: WARM,   SKELETAL:  LEFT BKA +  CNS:  AAO X 3    RADIOLOGY :      ASSESSMENT :     Anemia    No pertinent past medical history    Hypertension    Adrenal insufficiency    CKD (chronic kidney disease)    Anemia    Glaucoma    Coronary artery disease    HLD (hyperlipidemia)    Peripheral vascular disease    Spinal stenosis of lumbosacral region    Hyperparathyroidism    Diabetes mellitus    Diabetic neuropathy    Contracture of hand    Osteoarthritis    Osteoporosis    Vision loss of left eye    ESRD on hemodialysis    Cataract    BPH (benign prostatic hyperplasia)    UTI (urinary tract infection)    Bladder mass    H/O hematuria    Osteoporosis    Vision loss of right eye    Depression    Chronic GERD    Osteomyelitis of vertebra    CHF (congestive heart failure)    No significant past surgical history    Below knee amputation status, left    History of right cataract extraction    History of left cataract extraction    S/P arteriovenous (AV) fistula creation    H/O hematuria    H/O transurethral destruction of bladder lesion    History of excision of mass        PLAN:  HPI:  63M visually imparied with history of ESRD on dialysis T//sat, last dialysis on   HTN, NIDDM, hypothyroidism, GERD, COPD, presents from [facility] with report of coffee-ground emesis X3 this AM. Patient uncooperative with rest of history constantly demands food, denies any other complaints.    Denies HA, CP, SOB, NVD    In the ED, Hgb 8, no emesis reported, patient tolerated trial of ice chips CT pending, 2mg ativan given by ED (2023 23:36)    # PATIENT INTERMITTENTLY REFUSING MEDICATIONS AND REFUSED TO COMPLETE - COUNSELLED PATIENT AT LENGTH AND D/W PATIENT RISKS OF DEFERRING INCLUDE BUT ARE NOT LIMITED TO WORSENING PATHOLOGY, ARRHYTHMIA, RESPIRATORY DISTRESS, WORSENING INFECTION AND DEATH. PATIENT VERBALIZED UNDERSTANDING.    # DIARRHEA  - F/U STOOL STUDIES  - CIPROFLOXACIN + FLAGYLL, F/U BCX   - MONITORING CLOSELY  - GI CONSULT    # ELEVATED LACTIC ACID  - TREND    # RECURRENT INTRACTABLE NAUSEA/VOMITING, ? COFFEE GROUND EMESIS - IMPROVED  # GASTROPARESIS  # HISTORY OF ESOPHAGITIS AND DUODENITIS [2021], HX OF GASTROPARESIS W/ EVIDENCE OF THICKENED ESOPHAGUS ON PREVIOUS CT SCAN   # PANCREAS W/ DOUBLE DUCT SIGN    - MONITORING HGB, PLACED ON PPI BID , CARAFATE QID  - PRN ANTIEMETICS    - MONITORING FOR SYMPTOMS  - WHILE ON DIET PATIENT REPEATEDLY COUNSELLED TO CHEW FOOD CAUTIOUSLY AND CONSUME SMALL BITES W/ REGULAR CLEARING WITH WATER BETWEEN BITES    - ADVANCE DIET AS TOLERATED  - NOTED CT A/P    - GI CONSULT    # EPISODE OF HYPOGLYCEMIA   # GASTROPARESIS  # UNDERLYING DM  - SSI + FS    - PATIENT WAS RAPID RESPONSE DUE TO HYPOGLYCEMIA - IMPROVED S/P DEXTROSE PUSH, NOW MAINTAINING EUGLYCEMIA    # UNCONTROLLED HTN  # HYPERKALEMIA - IMPROVED  # ESRD ON HD TTS - W/ HX OF NONCOMPLIANCE    - TELEMETRY  - HYDRALAZINE, METOPROLOL AND NORVASC ; PRN IV ANTIHYPERTENSIVE  - LOKELMA  - SUPPLEMENTAL O2  - PLANNED FOR HD  - NEPHROLOGY CONSULT  - PULMONOLOGY CONSULT  - ID CONSULT    # DEPRESSION  - PLACED ON ZOLOFT    # PATIENT UNDERGOING OUTPATIENT WORKUP FOR CENTRAL VENOUS STENOSIS THROUGH NEPHROLOGY TEAM  - ? s/p STENT PLACEMENT    # ANEMIA OF CKD  # HX OF PANCYTOPENIA   - TREND HGB, TRANSFUSION THRESHOLD HGB < 7  - TYPE AND SCREEN  - ON EPO    - PPI BID, CARAFATE QID    # HX OF COPD  # PULMONARY HYPERTENSION    # SEVERE PROTEIN CALORIE MALNUTRITION, FAILURE TO THRIVE   -  TEMPORAL WASTING, LOSS OF MUSCLE MASS FROM SHOULDER AND HIP GIRDLE  - NUTRITIONAL SUPPLEMENT    # HLD  # PARTIALLY BLIND  # S/P LEFT BKA  # HX OF PVD  # LS SPINAL STENOSIS  # GI AND DVT PPX.      Patient is a 63y old  Male who presents with a chief complaint of Hematemesis (04 Dec 2023 17:51)    PATIENT IS SEEN AND EXAMINED IN MEDICAL FLOOR.    ALLERGIES:  No Known Drug Allergies  fish (Rash)  liver (Anaphylaxis)      Daily     Daily Weight in k (04 Dec 2023 15:34)    VITALS:    Vital Signs Last 24 Hrs  T(C): 36.3 (05 Dec 2023 04:50), Max: 37.1 (04 Dec 2023 13:38)  T(F): 97.4 (05 Dec 2023 04:50), Max: 98.7 (04 Dec 2023 13:38)  HR: 91 (05 Dec 2023 07:15) (91 - 100)  BP: 150/74 (05 Dec 2023 07:15) (135/71 - 166/75)  BP(mean): --  RR: 18 (05 Dec 2023 04:50) (17 - 18)  SpO2: 97% (05 Dec 2023 04:50) (95% - 99%)    Parameters below as of 05 Dec 2023 04:50  Patient On (Oxygen Delivery Method): room air        LABS:              CAPILLARY BLOOD GLUCOSE              Creatinine Trend: 8.70<--, 13.30<--, 12.70<--  I&O's Summary          .Stool Feces   @ 14:40   No enteric pathogens isolated.  (Stool culture examined for Salmonella,  Shigella, Campylobacter, Aeromonas, Plesiomonas,  Vibrio, E.coli O157 and Yersinia)  --  --          MEDICATIONS:    MEDICATIONS  (STANDING):  amLODIPine   Tablet 10 milliGRAM(s) Oral daily  atorvastatin 40 milliGRAM(s) Oral at bedtime  budesonide 160 MICROgram(s)/formoterol 4.5 MICROgram(s) Inhaler 2 Puff(s) Inhalation two times a day  chlorhexidine 2% Cloths 1 Application(s) Topical <User Schedule>  ciprofloxacin     Tablet 500 milliGRAM(s) Oral every 24 hours  epoetin beverley (PROCRIT) Injectable 39638 Unit(s) IV Push <User Schedule>  folic acid 1 milliGRAM(s) Oral daily  heparin   Injectable 5000 Unit(s) SubCutaneous every 12 hours  hydrALAZINE 25 milliGRAM(s) Oral three times a day  latanoprost 0.005% Ophthalmic Solution 1 Drop(s) Both EYES at bedtime  levothyroxine 25 MICROGram(s) Oral daily  LORazepam     Tablet 2 milliGRAM(s) Oral <User Schedule>  metoprolol tartrate 25 milliGRAM(s) Oral two times a day  metroNIDAZOLE    Tablet 500 milliGRAM(s) Oral every 8 hours  pantoprazole  Injectable 40 milliGRAM(s) IV Push every 12 hours  sertraline 200 milliGRAM(s) Oral daily  sevelamer carbonate 800 milliGRAM(s) Oral three times a day with meals  sucralfate 1 Gram(s) Oral every 6 hours      MEDICATIONS  (PRN):  acetaminophen     Tablet .. 650 milliGRAM(s) Oral every 6 hours PRN Temp greater or equal to 38C (100.4F), Mild Pain (1 - 3)  albuterol    90 MICROgram(s) HFA Inhaler 2 Puff(s) Inhalation every 6 hours PRN Bronchospasm  guaiFENesin Oral Liquid (Sugar-Free) 10 milliGRAM(s) Oral every 8 hours PRN Cough  melatonin 3 milliGRAM(s) Oral at bedtime PRN Insomnia  ondansetron Injectable 4 milliGRAM(s) IV Push every 8 hours PRN Nausea and/or Vomiting      REVIEW OF SYSTEMS:                           ALL ROS DONE [ X   ]    CONSTITUTIONAL:  LETHARGIC [   ], FEVER [   ], UNRESPONSIVE [   ]  CVS:  CP  [   ], SOB, [   ], PALPITATIONS [   ], DIZZYNESS [   ]  RS: COUGH [   ], SPUTUM [   ]  GI: ABDOMINAL PAIN [   ], NAUSEA [   ], VOMITINGS [   ], DIARRHEA [   ], CONSTIPATION [   ]  :  DYSURIA [   ], NOCTURIA [   ], INCREASED FREQUENCY [   ], DRIBLING [   ],  SKELETAL: PAINFUL JOINTS [   ], SWOLLEN JOINTS [   ], NECK ACHE [   ], LOW BACK ACHE [   ],  SKIN : ULCERS [   ], RASH [   ], ITCHING [   ]  CNS: HEAD ACHE [   ], DOUBLE VISION [   ], BLURRED VISION [   ], AMS / CONFUSION [   ], SEIZURES [   ], WEAKNESS [   ],TINGLING / NUMBNESS [   ]      PHYSICAL EXAMINATION:  GENERAL APPEARANCE: NO DISTRESS  HEENT:  NO PALLOR, NO  JVD,  NO   NODES, NECK SUPPLE  CVS: S1 +, S2 +,   RS: AEEB,  OCCASIONAL  RALES +,   NO RONCHI  ABD: SOFT, NT, NO, BS +  EXT: NO PE  SKIN: WARM,   SKELETAL:  LEFT BKA +  CNS:  AAO X 3    RADIOLOGY :      ASSESSMENT :     Anemia    No pertinent past medical history    Hypertension    Adrenal insufficiency    CKD (chronic kidney disease)    Anemia    Glaucoma    Coronary artery disease    HLD (hyperlipidemia)    Peripheral vascular disease    Spinal stenosis of lumbosacral region    Hyperparathyroidism    Diabetes mellitus    Diabetic neuropathy    Contracture of hand    Osteoarthritis    Osteoporosis    Vision loss of left eye    ESRD on hemodialysis    Cataract    BPH (benign prostatic hyperplasia)    UTI (urinary tract infection)    Bladder mass    H/O hematuria    Osteoporosis    Vision loss of right eye    Depression    Chronic GERD    Osteomyelitis of vertebra    CHF (congestive heart failure)    No significant past surgical history    Below knee amputation status, left    History of right cataract extraction    History of left cataract extraction    S/P arteriovenous (AV) fistula creation    H/O hematuria    H/O transurethral destruction of bladder lesion    History of excision of mass        PLAN:  HPI:  63M visually imparied with history of ESRD on dialysis T//sat, last dialysis on   HTN, NIDDM, hypothyroidism, GERD, COPD, presents from [facility] with report of coffee-ground emesis X3 this AM. Patient uncooperative with rest of history constantly demands food, denies any other complaints.    Denies HA, CP, SOB, NVD    In the ED, Hgb 8, no emesis reported, patient tolerated trial of ice chips CT pending, 2mg ativan given by ED (2023 23:36)    # PATIENT INTERMITTENTLY REFUSING MEDICATIONS AND REFUSED TO COMPLETE - COUNSELLED PATIENT AT LENGTH AND D/W PATIENT RISKS OF DEFERRING INCLUDE BUT ARE NOT LIMITED TO WORSENING PATHOLOGY, ARRHYTHMIA, RESPIRATORY DISTRESS, WORSENING INFECTION AND DEATH. PATIENT VERBALIZED UNDERSTANDING. PATIENT REFUSED HD, DISCUSSED RISKS OF DEFERRING HD INCLUDE BUT ARE NOT LIMITED TO ARRHYTHMIA, VOLUME OVERLOAD AND DEATH. PATIENT VERBALIZED UNDERSTANDING.     # DIARRHEA  -   IMPROVED  - F/U STOOL STUDIES  - CIPROFLOXACIN + FLAGYLL, F/U BCX   - MONITORING CLOSELY  - GI CONSULT    # ELEVATED LACTIC ACID  - TREND    # RECURRENT INTRACTABLE NAUSEA/VOMITING, ? COFFEE GROUND EMESIS - IMPROVED  # GASTROPARESIS  # HISTORY OF ESOPHAGITIS AND DUODENITIS [2021], HX OF GASTROPARESIS W/ EVIDENCE OF THICKENED ESOPHAGUS ON PREVIOUS CT SCAN   # PANCREAS W/ DOUBLE DUCT SIGN    - MONITORING HGB, PLACED ON PPI BID , CARAFATE QID  - PRN ANTIEMETICS    - MONITORING FOR SYMPTOMS  - WHILE ON DIET PATIENT REPEATEDLY COUNSELLED TO CHEW FOOD CAUTIOUSLY AND CONSUME SMALL BITES W/ REGULAR CLEARING WITH WATER BETWEEN BITES    - ADVANCE DIET AS TOLERATED  - NOTED CT A/P    - GI CONSULT    # EPISODE OF HYPOGLYCEMIA   # GASTROPARESIS  # UNDERLYING DM  - SSI + FS    - PATIENT WAS RAPID RESPONSE DUE TO HYPOGLYCEMIA - IMPROVED S/P DEXTROSE PUSH, NOW MAINTAINING EUGLYCEMIA    # UNCONTROLLED HTN  # HYPERKALEMIA - IMPROVED  # ESRD ON HD TTS - W/ HX OF NONCOMPLIANCE    - TELEMETRY  - HYDRALAZINE, METOPROLOL AND NORVASC ; PRN IV ANTIHYPERTENSIVE  - LOKELMA  - SUPPLEMENTAL O2  - PLANNED FOR HD  - NEPHROLOGY CONSULT  - PULMONOLOGY CONSULT  - ID CONSULT    # DEPRESSION  - PLACED ON ZOLOFT    # PATIENT UNDERGOING OUTPATIENT WORKUP FOR CENTRAL VENOUS STENOSIS THROUGH NEPHROLOGY TEAM  - ? s/p STENT PLACEMENT    # ANEMIA OF CKD  # HX OF PANCYTOPENIA   - TREND HGB, TRANSFUSION THRESHOLD HGB < 7  - TYPE AND SCREEN  - ON EPO    - PPI BID, CARAFATE QID    # HX OF COPD  # PULMONARY HYPERTENSION    # SEVERE PROTEIN CALORIE MALNUTRITION, FAILURE TO THRIVE   -  TEMPORAL WASTING, LOSS OF MUSCLE MASS FROM SHOULDER AND HIP GIRDLE  - NUTRITIONAL SUPPLEMENT    # HLD  # PARTIALLY BLIND  # S/P LEFT BKA  # HX OF PVD  # LS SPINAL STENOSIS  # GI AND DVT PPX.      Patient is a 63y old  Male who presents with a chief complaint of Hematemesis (04 Dec 2023 17:51)    PATIENT IS SEEN AND EXAMINED IN MEDICAL FLOOR.    ALLERGIES:  No Known Drug Allergies  fish (Rash)  liver (Anaphylaxis)      Daily     Daily Weight in k (04 Dec 2023 15:34)    VITALS:    Vital Signs Last 24 Hrs  T(C): 36.3 (05 Dec 2023 04:50), Max: 37.1 (04 Dec 2023 13:38)  T(F): 97.4 (05 Dec 2023 04:50), Max: 98.7 (04 Dec 2023 13:38)  HR: 91 (05 Dec 2023 07:15) (91 - 100)  BP: 150/74 (05 Dec 2023 07:15) (135/71 - 166/75)  BP(mean): --  RR: 18 (05 Dec 2023 04:50) (17 - 18)  SpO2: 97% (05 Dec 2023 04:50) (95% - 99%)    Parameters below as of 05 Dec 2023 04:50  Patient On (Oxygen Delivery Method): room air        LABS:              CAPILLARY BLOOD GLUCOSE              Creatinine Trend: 8.70<--, 13.30<--, 12.70<--  I&O's Summary          .Stool Feces   @ 14:40   No enteric pathogens isolated.  (Stool culture examined for Salmonella,  Shigella, Campylobacter, Aeromonas, Plesiomonas,  Vibrio, E.coli O157 and Yersinia)  --  --          MEDICATIONS:    MEDICATIONS  (STANDING):  amLODIPine   Tablet 10 milliGRAM(s) Oral daily  atorvastatin 40 milliGRAM(s) Oral at bedtime  budesonide 160 MICROgram(s)/formoterol 4.5 MICROgram(s) Inhaler 2 Puff(s) Inhalation two times a day  chlorhexidine 2% Cloths 1 Application(s) Topical <User Schedule>  ciprofloxacin     Tablet 500 milliGRAM(s) Oral every 24 hours  epoetin beverley (PROCRIT) Injectable 52716 Unit(s) IV Push <User Schedule>  folic acid 1 milliGRAM(s) Oral daily  heparin   Injectable 5000 Unit(s) SubCutaneous every 12 hours  hydrALAZINE 25 milliGRAM(s) Oral three times a day  latanoprost 0.005% Ophthalmic Solution 1 Drop(s) Both EYES at bedtime  levothyroxine 25 MICROGram(s) Oral daily  LORazepam     Tablet 2 milliGRAM(s) Oral <User Schedule>  metoprolol tartrate 25 milliGRAM(s) Oral two times a day  metroNIDAZOLE    Tablet 500 milliGRAM(s) Oral every 8 hours  pantoprazole  Injectable 40 milliGRAM(s) IV Push every 12 hours  sertraline 200 milliGRAM(s) Oral daily  sevelamer carbonate 800 milliGRAM(s) Oral three times a day with meals  sucralfate 1 Gram(s) Oral every 6 hours      MEDICATIONS  (PRN):  acetaminophen     Tablet .. 650 milliGRAM(s) Oral every 6 hours PRN Temp greater or equal to 38C (100.4F), Mild Pain (1 - 3)  albuterol    90 MICROgram(s) HFA Inhaler 2 Puff(s) Inhalation every 6 hours PRN Bronchospasm  guaiFENesin Oral Liquid (Sugar-Free) 10 milliGRAM(s) Oral every 8 hours PRN Cough  melatonin 3 milliGRAM(s) Oral at bedtime PRN Insomnia  ondansetron Injectable 4 milliGRAM(s) IV Push every 8 hours PRN Nausea and/or Vomiting      REVIEW OF SYSTEMS:                           ALL ROS DONE [ X   ]    CONSTITUTIONAL:  LETHARGIC [   ], FEVER [   ], UNRESPONSIVE [   ]  CVS:  CP  [   ], SOB, [   ], PALPITATIONS [   ], DIZZYNESS [   ]  RS: COUGH [   ], SPUTUM [   ]  GI: ABDOMINAL PAIN [   ], NAUSEA [   ], VOMITINGS [   ], DIARRHEA [   ], CONSTIPATION [   ]  :  DYSURIA [   ], NOCTURIA [   ], INCREASED FREQUENCY [   ], DRIBLING [   ],  SKELETAL: PAINFUL JOINTS [   ], SWOLLEN JOINTS [   ], NECK ACHE [   ], LOW BACK ACHE [   ],  SKIN : ULCERS [   ], RASH [   ], ITCHING [   ]  CNS: HEAD ACHE [   ], DOUBLE VISION [   ], BLURRED VISION [   ], AMS / CONFUSION [   ], SEIZURES [   ], WEAKNESS [   ],TINGLING / NUMBNESS [   ]      PHYSICAL EXAMINATION:  GENERAL APPEARANCE: NO DISTRESS  HEENT:  NO PALLOR, NO  JVD,  NO   NODES, NECK SUPPLE  CVS: S1 +, S2 +,   RS: AEEB,  OCCASIONAL  RALES +,   NO RONCHI  ABD: SOFT, NT, NO, BS +  EXT: NO PE  SKIN: WARM,   SKELETAL:  LEFT BKA +  CNS:  AAO X 3    RADIOLOGY :      ASSESSMENT :     Anemia    No pertinent past medical history    Hypertension    Adrenal insufficiency    CKD (chronic kidney disease)    Anemia    Glaucoma    Coronary artery disease    HLD (hyperlipidemia)    Peripheral vascular disease    Spinal stenosis of lumbosacral region    Hyperparathyroidism    Diabetes mellitus    Diabetic neuropathy    Contracture of hand    Osteoarthritis    Osteoporosis    Vision loss of left eye    ESRD on hemodialysis    Cataract    BPH (benign prostatic hyperplasia)    UTI (urinary tract infection)    Bladder mass    H/O hematuria    Osteoporosis    Vision loss of right eye    Depression    Chronic GERD    Osteomyelitis of vertebra    CHF (congestive heart failure)    No significant past surgical history    Below knee amputation status, left    History of right cataract extraction    History of left cataract extraction    S/P arteriovenous (AV) fistula creation    H/O hematuria    H/O transurethral destruction of bladder lesion    History of excision of mass        PLAN:  HPI:  63M visually imparied with history of ESRD on dialysis T//sat, last dialysis on   HTN, NIDDM, hypothyroidism, GERD, COPD, presents from [facility] with report of coffee-ground emesis X3 this AM. Patient uncooperative with rest of history constantly demands food, denies any other complaints.    Denies HA, CP, SOB, NVD    In the ED, Hgb 8, no emesis reported, patient tolerated trial of ice chips CT pending, 2mg ativan given by ED (2023 23:36)    # PATIENT INTERMITTENTLY REFUSING MEDICATIONS AND REFUSED TO COMPLETE - COUNSELLED PATIENT AT LENGTH AND D/W PATIENT RISKS OF DEFERRING INCLUDE BUT ARE NOT LIMITED TO WORSENING PATHOLOGY, ARRHYTHMIA, RESPIRATORY DISTRESS, WORSENING INFECTION AND DEATH. PATIENT VERBALIZED UNDERSTANDING. PATIENT REFUSED HD, DISCUSSED RISKS OF DEFERRING HD INCLUDE BUT ARE NOT LIMITED TO ARRHYTHMIA, VOLUME OVERLOAD AND DEATH. PATIENT VERBALIZED UNDERSTANDING.     # DIARRHEA  -   IMPROVED  - F/U STOOL STUDIES  - CIPROFLOXACIN + FLAGYLL, F/U BCX   - MONITORING CLOSELY  - GI CONSULT    # ELEVATED LACTIC ACID  - TREND    # RECURRENT INTRACTABLE NAUSEA/VOMITING, ? COFFEE GROUND EMESIS - IMPROVED  # GASTROPARESIS  # HISTORY OF ESOPHAGITIS AND DUODENITIS [2021], HX OF GASTROPARESIS W/ EVIDENCE OF THICKENED ESOPHAGUS ON PREVIOUS CT SCAN   # PANCREAS W/ DOUBLE DUCT SIGN    - MONITORING HGB, PLACED ON PPI BID , CARAFATE QID  - PRN ANTIEMETICS    - MONITORING FOR SYMPTOMS  - WHILE ON DIET PATIENT REPEATEDLY COUNSELLED TO CHEW FOOD CAUTIOUSLY AND CONSUME SMALL BITES W/ REGULAR CLEARING WITH WATER BETWEEN BITES    - ADVANCE DIET AS TOLERATED  - NOTED CT A/P    - GI CONSULT    # EPISODE OF HYPOGLYCEMIA   # GASTROPARESIS  # UNDERLYING DM  - SSI + FS    - PATIENT WAS RAPID RESPONSE DUE TO HYPOGLYCEMIA - IMPROVED S/P DEXTROSE PUSH, NOW MAINTAINING EUGLYCEMIA    # UNCONTROLLED HTN  # HYPERKALEMIA - IMPROVED  # ESRD ON HD TTS - W/ HX OF NONCOMPLIANCE    - TELEMETRY  - HYDRALAZINE, METOPROLOL AND NORVASC ; PRN IV ANTIHYPERTENSIVE  - LOKELMA  - SUPPLEMENTAL O2  - PLANNED FOR HD  - NEPHROLOGY CONSULT  - PULMONOLOGY CONSULT  - ID CONSULT    # DEPRESSION  - PLACED ON ZOLOFT    # PATIENT UNDERGOING OUTPATIENT WORKUP FOR CENTRAL VENOUS STENOSIS THROUGH NEPHROLOGY TEAM  - ? s/p STENT PLACEMENT    # ANEMIA OF CKD  # HX OF PANCYTOPENIA   - TREND HGB, TRANSFUSION THRESHOLD HGB < 7  - TYPE AND SCREEN  - ON EPO    - PPI BID, CARAFATE QID    # HX OF COPD  # PULMONARY HYPERTENSION    # SEVERE PROTEIN CALORIE MALNUTRITION, FAILURE TO THRIVE   -  TEMPORAL WASTING, LOSS OF MUSCLE MASS FROM SHOULDER AND HIP GIRDLE  - NUTRITIONAL SUPPLEMENT    # HLD  # PARTIALLY BLIND  # S/P LEFT BKA  # HX OF PVD  # LS SPINAL STENOSIS  # GI AND DVT PPX.

## 2023-12-05 NOTE — PROGRESS NOTE ADULT - PROBLEM SELECTOR PLAN 2
per [facility] Coffee ground emesis X3 in AM  However no emesis all day in ED, ice trips trial tolerated  CT A&P:  Dilated main pancreatic duct redemonstrated.  IV protonix BI  Sucralfate qid  PRN Zofran  GI Consulted NP Karen/Dr. Lee  EGD to be done outpatient per [facility] Coffee ground emesis X3 in AM  However no emesis all day in ED, ice trips trial tolerated  CT A&P:  Dilated main pancreatic duct redemonstrated.  IV protonix BI  Sucralfate qid  PRN Zofran  GI Consulted NP Karen/Dr. Lee  EGD to be done outpatient

## 2023-12-05 NOTE — PROGRESS NOTE ADULT - PROBLEM SELECTOR PLAN 3
dialysis per Nephro  pt had incomplete dialysis so far  will need complete dialysis tomorrow and d/c planning

## 2023-12-05 NOTE — PROGRESS NOTE ADULT - ASSESSMENT
63M visually imparied with history of ESRD on dialysis T/TH/sat, last dialysis on 11/28  HTN, NIDDM, hypothyroidism, GERD, COPD, presents from [facility] with report of coffee-ground emesis Hgb 8, GI consulted no further intervention at this time.  63M visually imparied with history of ESRD on dialysis T/TH/sat, last dialysis on 11/28  HTN, NIDDM, hypothyroidism, GERD, COPD, presents from Health system with report of coffee-ground emesis Hgb 8, GI consulted no further intervention at this time.

## 2023-12-05 NOTE — PROGRESS NOTE ADULT - SUBJECTIVE AND OBJECTIVE BOX
Patient is a 63y old  Male who presents with a chief complaint of Hematemesis (05 Dec 2023 10:15)      INTERVAL HPI/OVERNIGHT EVENTS: No acute events overnight       REVIEW OF SYSTEMS:  CONSTITUTIONAL: No fever, chills  ENMT:  No difficulty hearing, no change in vision  NECK: No pain or stiffness  RESPIRATORY: No cough, SOB  CARDIOVASCULAR: No chest pain, palpitations  GASTROINTESTINAL: No abdominal pain. No nausea, vomiting, or diarrhea  GENITOURINARY: No dysuria  NEUROLOGICAL: No HA  SKIN: No itching, burning, rashes, or lesions   LYMPH NODES: No enlarged glands  ENDOCRINE: No heat or cold intolerance; No hair loss  MUSCULOSKELETAL: No joint pain or swelling; No muscle, back, or extremity pain  PSYCHIATRIC: No depression, anxiety  HEME/LYMPH: No easy bruising, or bleeding gums    T(C): 36.6 (12-05-23 @ 13:36), Max: 36.6 (12-05-23 @ 02:25)  HR: 83 (12-05-23 @ 13:36) (83 - 100)  BP: 120/66 (12-05-23 @ 13:36) (120/66 - 154/71)  RR: 16 (12-05-23 @ 13:36) (16 - 18)  SpO2: 97% (12-05-23 @ 13:36) (97% - 99%)  Wt(kg): --Vital Signs Last 24 Hrs  T(C): 36.6 (05 Dec 2023 13:36), Max: 36.6 (05 Dec 2023 02:25)  T(F): 97.8 (05 Dec 2023 13:36), Max: 97.9 (05 Dec 2023 02:25)  HR: 83 (05 Dec 2023 13:36) (83 - 100)  BP: 120/66 (05 Dec 2023 13:36) (120/66 - 154/71)  BP(mean): --  RR: 16 (05 Dec 2023 13:36) (16 - 18)  SpO2: 97% (05 Dec 2023 13:36) (97% - 99%)    Parameters below as of 05 Dec 2023 13:36  Patient On (Oxygen Delivery Method): room air    MEDICATIONS  (STANDING):  amLODIPine   Tablet 10 milliGRAM(s) Oral daily  atorvastatin 40 milliGRAM(s) Oral at bedtime  budesonide 160 MICROgram(s)/formoterol 4.5 MICROgram(s) Inhaler 2 Puff(s) Inhalation two times a day  chlorhexidine 2% Cloths 1 Application(s) Topical <User Schedule>  ciprofloxacin     Tablet 500 milliGRAM(s) Oral every 24 hours  dextrose 5%. 1000 milliLiter(s) (100 mL/Hr) IV Continuous <Continuous>  dextrose 5%. 1000 milliLiter(s) (50 mL/Hr) IV Continuous <Continuous>  dextrose 50% Injectable 12.5 Gram(s) IV Push once  dextrose 50% Injectable 25 Gram(s) IV Push once  dextrose 50% Injectable 25 Gram(s) IV Push once  epoetin beverley (PROCRIT) Injectable 78660 Unit(s) IV Push <User Schedule>  folic acid 1 milliGRAM(s) Oral daily  glucagon  Injectable 1 milliGRAM(s) IntraMuscular once  heparin   Injectable 5000 Unit(s) SubCutaneous every 12 hours  hydrALAZINE 25 milliGRAM(s) Oral three times a day  insulin lispro (ADMELOG) corrective regimen sliding scale   SubCutaneous at bedtime  insulin lispro (ADMELOG) corrective regimen sliding scale   SubCutaneous three times a day before meals  latanoprost 0.005% Ophthalmic Solution 1 Drop(s) Both EYES at bedtime  levothyroxine 25 MICROGram(s) Oral daily  LORazepam     Tablet 2 milliGRAM(s) Oral <User Schedule>  metoprolol tartrate 25 milliGRAM(s) Oral two times a day  metroNIDAZOLE    Tablet 500 milliGRAM(s) Oral every 8 hours  pantoprazole  Injectable 40 milliGRAM(s) IV Push every 12 hours  sertraline 200 milliGRAM(s) Oral daily  sevelamer carbonate 800 milliGRAM(s) Oral three times a day with meals  sucralfate 1 Gram(s) Oral every 6 hours    MEDICATIONS  (PRN):  acetaminophen     Tablet .. 650 milliGRAM(s) Oral every 6 hours PRN Temp greater or equal to 38C (100.4F), Mild Pain (1 - 3)  albuterol    90 MICROgram(s) HFA Inhaler 2 Puff(s) Inhalation every 6 hours PRN Bronchospasm  dextrose Oral Gel 15 Gram(s) Oral once PRN Blood Glucose LESS THAN 70 milliGRAM(s)/deciliter  guaiFENesin Oral Liquid (Sugar-Free) 10 milliGRAM(s) Oral every 8 hours PRN Cough  melatonin 3 milliGRAM(s) Oral at bedtime PRN Insomnia  ondansetron Injectable 4 milliGRAM(s) IV Push every 8 hours PRN Nausea and/or Vomiting      PHYSICAL EXAM: Pt refuse NP to assess pt       Consultant(s) Notes Reviewed:  [x ] YES  [ ] NO  Care Discussed with Consultants/Other Providers [ x] YES  [ ] NO    LABS:      CAPILLARY BLOOD GLUCOSE      POCT Blood Glucose.: 274 mg/dL (05 Dec 2023 12:01)      RADIOLOGY & ADDITIONAL TESTS:    Imaging Personally Reviewed:  [x ] YES  [ ] NO  < from: Xray Chest 1 View- PORTABLE-Urgent (Xray Chest 1 View- PORTABLE-Urgent .) (12.02.23 @ 02:05) >    ACC: [id number] EXAM:  XR CHEST PORTABLE URGENT 1V   ORDERED BY: ROHINI NASSAR     PROCEDURE DATE:  12/02/2023          INTERPRETATION:  Portable chest radiograph    CLINICAL INFORMATION: Dyspnea, shortness of breath.    TECHNIQUE:  PortableAP chest radiograph.    COMPARISON: 11/30/2023 chest x-ray .    FINDINGS:  CATHETERS AND TUBES: None    PULMONARY: Bilateral perihilar ill-defined mild diffuse airspace disease.  No pneumothorax.    HEART/VASCULAR: The  heart is enlarged in transverse diameter.    BONES: Visualized osseous thorax intact.    IMPRESSION:   Cardiomegaly.  Bilateral perihilar mild diffuse airspace disease.  .    --- End of Report ---            ARASH MONTGOMERY MD; Attending Radiologist  This document has been electronically signed. Dec  3 2023 11:50AM    < end of copied text >       Patient is a 63y old  Male who presents with a chief complaint of Hematemesis (05 Dec 2023 10:15)      INTERVAL HPI/OVERNIGHT EVENTS: No acute events overnight       REVIEW OF SYSTEMS:  CONSTITUTIONAL: No fever, chills  ENMT:  No difficulty hearing, no change in vision  NECK: No pain or stiffness  RESPIRATORY: No cough, SOB  CARDIOVASCULAR: No chest pain, palpitations  GASTROINTESTINAL: No abdominal pain. No nausea, vomiting, or diarrhea  GENITOURINARY: No dysuria  NEUROLOGICAL: No HA  SKIN: No itching, burning, rashes, or lesions   LYMPH NODES: No enlarged glands  ENDOCRINE: No heat or cold intolerance; No hair loss  MUSCULOSKELETAL: No joint pain or swelling; No muscle, back, or extremity pain  PSYCHIATRIC: No depression, anxiety  HEME/LYMPH: No easy bruising, or bleeding gums    T(C): 36.6 (12-05-23 @ 13:36), Max: 36.6 (12-05-23 @ 02:25)  HR: 83 (12-05-23 @ 13:36) (83 - 100)  BP: 120/66 (12-05-23 @ 13:36) (120/66 - 154/71)  RR: 16 (12-05-23 @ 13:36) (16 - 18)  SpO2: 97% (12-05-23 @ 13:36) (97% - 99%)  Wt(kg): --Vital Signs Last 24 Hrs  T(C): 36.6 (05 Dec 2023 13:36), Max: 36.6 (05 Dec 2023 02:25)  T(F): 97.8 (05 Dec 2023 13:36), Max: 97.9 (05 Dec 2023 02:25)  HR: 83 (05 Dec 2023 13:36) (83 - 100)  BP: 120/66 (05 Dec 2023 13:36) (120/66 - 154/71)  BP(mean): --  RR: 16 (05 Dec 2023 13:36) (16 - 18)  SpO2: 97% (05 Dec 2023 13:36) (97% - 99%)    Parameters below as of 05 Dec 2023 13:36  Patient On (Oxygen Delivery Method): room air    MEDICATIONS  (STANDING):  amLODIPine   Tablet 10 milliGRAM(s) Oral daily  atorvastatin 40 milliGRAM(s) Oral at bedtime  budesonide 160 MICROgram(s)/formoterol 4.5 MICROgram(s) Inhaler 2 Puff(s) Inhalation two times a day  chlorhexidine 2% Cloths 1 Application(s) Topical <User Schedule>  ciprofloxacin     Tablet 500 milliGRAM(s) Oral every 24 hours  dextrose 5%. 1000 milliLiter(s) (100 mL/Hr) IV Continuous <Continuous>  dextrose 5%. 1000 milliLiter(s) (50 mL/Hr) IV Continuous <Continuous>  dextrose 50% Injectable 12.5 Gram(s) IV Push once  dextrose 50% Injectable 25 Gram(s) IV Push once  dextrose 50% Injectable 25 Gram(s) IV Push once  epoetin beverley (PROCRIT) Injectable 18480 Unit(s) IV Push <User Schedule>  folic acid 1 milliGRAM(s) Oral daily  glucagon  Injectable 1 milliGRAM(s) IntraMuscular once  heparin   Injectable 5000 Unit(s) SubCutaneous every 12 hours  hydrALAZINE 25 milliGRAM(s) Oral three times a day  insulin lispro (ADMELOG) corrective regimen sliding scale   SubCutaneous at bedtime  insulin lispro (ADMELOG) corrective regimen sliding scale   SubCutaneous three times a day before meals  latanoprost 0.005% Ophthalmic Solution 1 Drop(s) Both EYES at bedtime  levothyroxine 25 MICROGram(s) Oral daily  LORazepam     Tablet 2 milliGRAM(s) Oral <User Schedule>  metoprolol tartrate 25 milliGRAM(s) Oral two times a day  metroNIDAZOLE    Tablet 500 milliGRAM(s) Oral every 8 hours  pantoprazole  Injectable 40 milliGRAM(s) IV Push every 12 hours  sertraline 200 milliGRAM(s) Oral daily  sevelamer carbonate 800 milliGRAM(s) Oral three times a day with meals  sucralfate 1 Gram(s) Oral every 6 hours    MEDICATIONS  (PRN):  acetaminophen     Tablet .. 650 milliGRAM(s) Oral every 6 hours PRN Temp greater or equal to 38C (100.4F), Mild Pain (1 - 3)  albuterol    90 MICROgram(s) HFA Inhaler 2 Puff(s) Inhalation every 6 hours PRN Bronchospasm  dextrose Oral Gel 15 Gram(s) Oral once PRN Blood Glucose LESS THAN 70 milliGRAM(s)/deciliter  guaiFENesin Oral Liquid (Sugar-Free) 10 milliGRAM(s) Oral every 8 hours PRN Cough  melatonin 3 milliGRAM(s) Oral at bedtime PRN Insomnia  ondansetron Injectable 4 milliGRAM(s) IV Push every 8 hours PRN Nausea and/or Vomiting      PHYSICAL EXAM: Pt refuse NP to assess pt       Consultant(s) Notes Reviewed:  [x ] YES  [ ] NO  Care Discussed with Consultants/Other Providers [ x] YES  [ ] NO    LABS:      CAPILLARY BLOOD GLUCOSE      POCT Blood Glucose.: 274 mg/dL (05 Dec 2023 12:01)      RADIOLOGY & ADDITIONAL TESTS:    Imaging Personally Reviewed:  [x ] YES  [ ] NO  < from: Xray Chest 1 View- PORTABLE-Urgent (Xray Chest 1 View- PORTABLE-Urgent .) (12.02.23 @ 02:05) >    ACC: [id number] EXAM:  XR CHEST PORTABLE URGENT 1V   ORDERED BY: ROHINI NASSAR     PROCEDURE DATE:  12/02/2023          INTERPRETATION:  Portable chest radiograph    CLINICAL INFORMATION: Dyspnea, shortness of breath.    TECHNIQUE:  PortableAP chest radiograph.    COMPARISON: 11/30/2023 chest x-ray .    FINDINGS:  CATHETERS AND TUBES: None    PULMONARY: Bilateral perihilar ill-defined mild diffuse airspace disease.  No pneumothorax.    HEART/VASCULAR: The  heart is enlarged in transverse diameter.    BONES: Visualized osseous thorax intact.    IMPRESSION:   Cardiomegaly.  Bilateral perihilar mild diffuse airspace disease.  .    --- End of Report ---            ARASH MONTGOMERY MD; Attending Radiologist  This document has been electronically signed. Dec  3 2023 11:50AM    < end of copied text >

## 2023-12-06 NOTE — PROGRESS NOTE ADULT - REASON FOR ADMISSION
Hematemesis

## 2023-12-06 NOTE — DISCHARGE NOTE NURSING/CASE MANAGEMENT/SOCIAL WORK - NSDCPEFALRISK_GEN_ALL_CORE
For information on Fall & Injury Prevention, visit: [email or web address] OR  [email or web address] OR  https://www.cdc.gov/steadi/patient.html For information on Fall & Injury Prevention, visit: [email or web address] OR  [email or web address] OR  https://www.cdc.gov/steadi/patient.html

## 2023-12-06 NOTE — DISCHARGE NOTE NURSING/CASE MANAGEMENT/SOCIAL WORK - PATIENT PORTAL LINK FT
You can access the FollowMyHealth Patient Portal offered by [facility] by registering at the following website: [email or web address]. By joining [employer]’s FollowMyHealth portal, you will also be able to view your health information using other applications (apps) compatible with our system. You can access the FollowMyHealth Patient Portal offered by [facility] by registering at the following website: [email or web address]. By joining [facility] [employer] [facility]’s FollowMyHealth portal, you will also be able to view your health information using other applications (apps) compatible with our system.

## 2023-12-06 NOTE — PROGRESS NOTE ADULT - ASSESSMENT
62 year old male from [facility], walks with RW, with PMH of ESRD on HD (TTS), BKA- L w/prosthetic leg, DM, Left eye blindness, CHF, CAD, HTN, HLD, osteoporosis, bronchitis, current smoker, and anemia who presents with complaint of coffee ground emesis    # ESRD. on HD TTS as outpt.   only agreed to 2 hrs of HD today  preHD hyperkalemia- resolved after HD   # anemia of CKD and Gastrointestinal bleed.  epogen on HD. Transfuse for HBG <7.   #CKDMBD. cont sevelamer  # HTN. blood pressure at goal  D/C planning       62 year old male from [facility], walks with RW, with PMH of ESRD on HD (TTS), BKA- L w/prosthetic leg, DM, Left eye blindness, CHF, CAD, HTN, HLD, osteoporosis, bronchitis, current smoker, and anemia who presents with complaint of coffee ground emesis    # ESRD. on HD TTS as outpt.   only agreed to 2 hrs of HD today  preHD hyperkalemia- resolved after HD   # anemia of CKD and Gastrointestinal bleed.  epogen on HD. Transfuse for HBG <7.   #CKDMBD. cont sevelamer  # HTN. blood pressure at goal  D/C planning

## 2023-12-06 NOTE — PROGRESS NOTE ADULT - PROVIDER SPECIALTY LIST ADULT
Endocrinology
Internal Medicine
Nephrology
Endocrinology
Internal Medicine
Internal Medicine
Nephrology
Internal Medicine

## 2023-12-06 NOTE — PROVIDER CONTACT NOTE (CRITICAL VALUE NOTIFICATION) - SITUATION
Juan. S from [facility] called for critical Potassium 6.9. NP, Esme Ayers made aware Juan. S from [facility] called for critical Potassium 6.9. NP, Esme Ayers made aware

## 2023-12-06 NOTE — PROGRESS NOTE ADULT - SUBJECTIVE AND OBJECTIVE BOX
[facility] : Progress Note :: [phone], (office [phone]),   Dr Mosher / Dr Washington / Dr Young / Dr Doll / Dr Varsha TURCIOS / Dr Tello / Dr Garcia / Dr Larry qiu  _____________________________________________________________________________________________  only agreed to 2 hrs of HD today  preHD hyperkalemia- resolved after HD     No Known Drug Allergies  fish (Rash)  liver (Anaphylaxis)    Hospital Medications:   MEDICATIONS  (STANDING):  amLODIPine   Tablet 10 milliGRAM(s) Oral daily  atorvastatin 40 milliGRAM(s) Oral at bedtime  budesonide 160 MICROgram(s)/formoterol 4.5 MICROgram(s) Inhaler 2 Puff(s) Inhalation two times a day  chlorhexidine 2% Cloths 1 Application(s) Topical <User Schedule>  ciprofloxacin     Tablet 500 milliGRAM(s) Oral every 24 hours  dextrose 5%. 1000 milliLiter(s) (100 mL/Hr) IV Continuous <Continuous>  dextrose 5%. 1000 milliLiter(s) (50 mL/Hr) IV Continuous <Continuous>  dextrose 50% Injectable 12.5 Gram(s) IV Push once  dextrose 50% Injectable 25 Gram(s) IV Push once  dextrose 50% Injectable 25 Gram(s) IV Push once  epoetin beverley (PROCRIT) Injectable 68887 Unit(s) IV Push <User Schedule>  folic acid 1 milliGRAM(s) Oral daily  glucagon  Injectable 1 milliGRAM(s) IntraMuscular once  heparin   Injectable 5000 Unit(s) SubCutaneous every 12 hours  hydrALAZINE 25 milliGRAM(s) Oral three times a day  insulin lispro (ADMELOG) corrective regimen sliding scale   SubCutaneous three times a day before meals  insulin lispro (ADMELOG) corrective regimen sliding scale   SubCutaneous at bedtime  latanoprost 0.005% Ophthalmic Solution 1 Drop(s) Both EYES at bedtime  levothyroxine 25 MICROGram(s) Oral daily  LORazepam     Tablet 2 milliGRAM(s) Oral <User Schedule>  metoprolol tartrate 25 milliGRAM(s) Oral two times a day  metroNIDAZOLE    Tablet 500 milliGRAM(s) Oral every 8 hours  pantoprazole  Injectable 40 milliGRAM(s) IV Push every 12 hours  sertraline 200 milliGRAM(s) Oral daily  sevelamer carbonate 800 milliGRAM(s) Oral three times a day with meals  sucralfate 1 Gram(s) Oral every 6 hours        VITALS:  T(F): 98.1 (12-06-23 @ 13:26), Max: 98.2 (12-06-23 @ 05:08)  HR: 109 (12-06-23 @ 13:26)  BP: 133/75 (12-06-23 @ 13:26)  RR: 18 (12-06-23 @ 13:26)  SpO2: 96% (12-06-23 @ 13:26)  Wt(kg): --    12-06 @ 07:01  -  12-06 @ 17:36  --------------------------------------------------------  IN: 550 mL / OUT: 1618 mL / NET: -1068 mL        PHYSICAL EXAM:  Constitutional: NAD  HEENT: anicteric sclera, oropharynx clear,  Neck: No JVD  Respiratory: CTAB, no wheezes, rales or rhonchi  Cardiovascular: S1, S2, RRR  Gastrointestinal: BS+, soft, NT/ND  Extremities:  No peripheral edema  Neurological: A/O x 3, no focal deficits.  : No CVA tenderness. No [place].   Skin: No rashes  Vascular Access: AVF with thrill     LABS:  12-06    130<L>  |  98  |  25<H>  ----------------------------<  423<H>  4.1   |  25  |  6.62<H>    Ca    7.8<L>      06 Dec 2023 13:46      Creatinine Trend: 6.62 <--, 12.40 <--, 8.70 <--, 13.30 <--, 12.70 <--                        8.7    4.01  )-----------( 75       ( 06 Dec 2023 11:15 )             28.6     Urine Studies:  Urinalysis Basic - ( 06 Dec 2023 13:46 )    Color:  / Appearance:  / SG:  / pH:   Gluc: 423 mg/dL / Ketone:   / Bili:  / Urobili:    Blood:  / Protein:  / Nitrite:    Leuk Esterase:  / RBC:  / WBC    Sq Epi:  / Non Sq Epi:  / Bacteria:         RADIOLOGY & ADDITIONAL STUDIES:   [facility] : Progress Note :: [phone], (office [phone]),   Dr Mosher / Dr Washington / Dr Young / Dr Doll / Dr Varsha TURCIOS / Dr Tello / Dr Garcia / Dr Larry qiu  _____________________________________________________________________________________________  only agreed to 2 hrs of HD today  preHD hyperkalemia- resolved after HD     No Known Drug Allergies  fish (Rash)  liver (Anaphylaxis)    Hospital Medications:   MEDICATIONS  (STANDING):  amLODIPine   Tablet 10 milliGRAM(s) Oral daily  atorvastatin 40 milliGRAM(s) Oral at bedtime  budesonide 160 MICROgram(s)/formoterol 4.5 MICROgram(s) Inhaler 2 Puff(s) Inhalation two times a day  chlorhexidine 2% Cloths 1 Application(s) Topical <User Schedule>  ciprofloxacin     Tablet 500 milliGRAM(s) Oral every 24 hours  dextrose 5%. 1000 milliLiter(s) (100 mL/Hr) IV Continuous <Continuous>  dextrose 5%. 1000 milliLiter(s) (50 mL/Hr) IV Continuous <Continuous>  dextrose 50% Injectable 12.5 Gram(s) IV Push once  dextrose 50% Injectable 25 Gram(s) IV Push once  dextrose 50% Injectable 25 Gram(s) IV Push once  epoetin beverley (PROCRIT) Injectable 36165 Unit(s) IV Push <User Schedule>  folic acid 1 milliGRAM(s) Oral daily  glucagon  Injectable 1 milliGRAM(s) IntraMuscular once  heparin   Injectable 5000 Unit(s) SubCutaneous every 12 hours  hydrALAZINE 25 milliGRAM(s) Oral three times a day  insulin lispro (ADMELOG) corrective regimen sliding scale   SubCutaneous three times a day before meals  insulin lispro (ADMELOG) corrective regimen sliding scale   SubCutaneous at bedtime  latanoprost 0.005% Ophthalmic Solution 1 Drop(s) Both EYES at bedtime  levothyroxine 25 MICROGram(s) Oral daily  LORazepam     Tablet 2 milliGRAM(s) Oral <User Schedule>  metoprolol tartrate 25 milliGRAM(s) Oral two times a day  metroNIDAZOLE    Tablet 500 milliGRAM(s) Oral every 8 hours  pantoprazole  Injectable 40 milliGRAM(s) IV Push every 12 hours  sertraline 200 milliGRAM(s) Oral daily  sevelamer carbonate 800 milliGRAM(s) Oral three times a day with meals  sucralfate 1 Gram(s) Oral every 6 hours        VITALS:  T(F): 98.1 (12-06-23 @ 13:26), Max: 98.2 (12-06-23 @ 05:08)  HR: 109 (12-06-23 @ 13:26)  BP: 133/75 (12-06-23 @ 13:26)  RR: 18 (12-06-23 @ 13:26)  SpO2: 96% (12-06-23 @ 13:26)  Wt(kg): --    12-06 @ 07:01  -  12-06 @ 17:36  --------------------------------------------------------  IN: 550 mL / OUT: 1618 mL / NET: -1068 mL        PHYSICAL EXAM:  Constitutional: NAD  HEENT: anicteric sclera, oropharynx clear,  Neck: No JVD  Respiratory: CTAB, no wheezes, rales or rhonchi  Cardiovascular: S1, S2, RRR  Gastrointestinal: BS+, soft, NT/ND  Extremities:  No peripheral edema  Neurological: A/O x 3, no focal deficits.  : No CVA tenderness. No [place].   Skin: No rashes  Vascular Access: AVF with thrill     LABS:  12-06    130<L>  |  98  |  25<H>  ----------------------------<  423<H>  4.1   |  25  |  6.62<H>    Ca    7.8<L>      06 Dec 2023 13:46      Creatinine Trend: 6.62 <--, 12.40 <--, 8.70 <--, 13.30 <--, 12.70 <--                        8.7    4.01  )-----------( 75       ( 06 Dec 2023 11:15 )             28.6     Urine Studies:  Urinalysis Basic - ( 06 Dec 2023 13:46 )    Color:  / Appearance:  / SG:  / pH:   Gluc: 423 mg/dL / Ketone:   / Bili:  / Urobili:    Blood:  / Protein:  / Nitrite:    Leuk Esterase:  / RBC:  / WBC    Sq Epi:  / Non Sq Epi:  / Bacteria:         RADIOLOGY & ADDITIONAL STUDIES:

## 2023-12-07 NOTE — PROGRESS NOTE ADULT - ASSESSMENT
12 A/P:   1.CKD:stage 4, secondary to dm/htn/smoking. 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   -elevated Cr is at baseline. patient is agreeable for dialysis   -Keep patient euvolemic and renal diet  -Avoid Nephrotoxic Meds/ Agents such as (NSAIDs, IV contrast, Aminoglycosides such as gentamicin, -Gadolinium contrast, Phosphate containing enemas, etc..)  -Adjust Medications according to eGFR  -f/u bmp daily    2.Hypoglycemia: patient has diarrhea and reports poor intake. continue supplementation. encourage increase po intake     3. Hypokalemia: now k is nl  -keep K >4 and <5  -f/u K level daily    4.Anemia:MF  -may need procrit once bp is controlled  -f/u Hb daily    5.Htn: BP is mildly high  -please  titrate bp meds as ordered to keep sbp<130  -avoid Nahco3    6.MBD:secondary to ckd  -f/u phos and pth level in am  7. Diarrhea  consider loperamide if C diff is ruled out A/P:   1.CKD:stage 4, secondary to dm/htn/smoking.   -elevated Cr is at baseline. patient is agreeable for dialysis   - seen by vascular for AV fistula 1/24  -Keep patient euvolemic and renal diet  -Avoid Nephrotoxic Meds/ Agents such as (NSAIDs, IV contrast, Aminoglycosides such as gentamicin, -Gadolinium contrast, Phosphate containing enemas, etc..)  -Adjust Medications according to eGFR  -f/u bmp daily  - f/u with Dr Lambert for post discharge renal care  - no need for HD at this time     2.Hypoglycemia: patient has diarrhea and reports poor intake. continue supplementation. encourage increase po intake     3. Hypokalemia: now k is nl  -keep K >4 and <5  -f/u K level daily    4.Anemia:MF  -may need procrit once bp is controlled  -f/u Hb daily    5.Htn: BP is mildly high  -please  titrate bp meds as ordered to keep sbp<130  -avoid Nahco3    6.MBD:secondary to ckd  -f/u phos and pth level in am  7. Diarrhea  consider loperamide if C diff is ruled out A/P:   1.CKD:stage 4, secondary to dm/htn/smoking.   -elevated Cr is at baseline. patient is agreeable for avf for future dialysis  - seen by vascular for AV fistula 1/24  -Keep patient euvolemic and renal diet  -Avoid Nephrotoxic Meds/ Agents such as (NSAIDs, IV contrast, Aminoglycosides such as gentamicin, -Gadolinium contrast, Phosphate containing enemas, etc..)  -Adjust Medications according to eGFR  -f/u bmp daily  - f/u with Dr Lambert for post discharge renal care  - no need for HD at this time     2.Hypoglycemia: patient has diarrhea and reports poor intake. continue supplementation. encourage increase po intake     3. Hypokalemia: now k is nl  -keep K >4 and <5  -f/u K level daily    4.Anemia:MF  -may need procrit once bp is controlled  -f/u Hb daily    5.Htn: BP is mildly high  -please  titrate bp meds as ordered to keep sbp<130  -avoid Nahco3    6.MBD:secondary to ckd  -pt ha sacceptble pth and phos level  7. Diarrhea  consider loperamide if C diff is ruled out

## 2023-12-13 NOTE — ASU DISCHARGE PLAN (ADULT/PEDIATRIC). - C. MAKE IMPORTANT PERSONAL OR BUSINESS DECISIONS
[FreeTextEntry1] : 67 y/o F s/p R TKR and L knee OA  -CSI given to L knee and tolerated well -NM Bone scan of R knee to r/o loosening -Follow up after Bone scan  01/04/2023 bone scan negative. patient has atrophy and ann marie spur over the proximal pole of the patella. MRI to r/o partial tearing of quadriceps tendon.  01/18/2023 normal MRI, mild quad tendonitis. continue PT and PRN fu for CSI 4/5/23: CSI L knee, follow up PRN. 12/13/2023 CSI of Left knee today and 3 month fu Statement Selected

## 2023-12-13 NOTE — ED PROVIDER NOTE - RESPIRATORY, MLM
Date: 12/13/2023  Patient: Satya Escoto  Admitted: 12/13/2023  6:02 PM  Attending Provider: Bev Walls DO    Satya Escoto or her authorized caregiver has made the decision for the patient to leave the emergency department against the adv ice of her attending physician. She or her authorized caregiver has been informed and understands the inherent risks, including death. She or her authorized caregiver has decided to accept the responsibility for this decision. Satya Escoto and all  necessary parties have been advised that she may return for further evaluation or treatment. Her condition at time of discharge was stable.   Satya Escoto had current vital signs as follows:  /91   Pulse 86   Temp (!) 97.4 °F (36.3 °C) (Tempo ral)   Resp 18   Ht 5' 5" (1.651 m)   Wt 79.4 kg (175 lb)
Breath sounds clear and equal bilaterally.

## 2023-12-16 NOTE — ED ADULT NURSE NOTE - CCCP TRG CHIEF CMPLNT
Subjective   Patient ID: Feng is a 43 year old male.    Chief Complaint   Patient presents with    Hypertension     Follow up      43-year-old male patient with Dr. Ocampo came in for recheck on his blood pressure.  S/p CVA left-sided weakness.  Clinically patient is feeling better patient was 180 pounds BMI of 28.  Patient did not get COVID vaccination.  Patient's blood pressure today is 100/70 recheck blood pressure was 90/60 patient denies any headache dizziness or weakness patient is on lisinopril HCT 10/12.5 daily and nifedipine 90 mg daily patient advised to decrease nifedipine to 60 mg daily and monitor blood pressure closely.  Patient recently had blood work which shows chronic kidney disease stage IIIa patient does not take NSAIDs patient advised to push fluids.  Lipid profile and BMP has been ordered.  Patient has no known allergies patient is ambulatory has been followed up by neurology regularly speech is normal still has weakness on the left upper and lower extremity ambulatory does not use a cane for ambulation          Review of Systems   HENT:          Can smell and taste food   Eyes:         Vision is normal patient wears glasses   Respiratory: Negative.     Cardiovascular: Negative.    Gastrointestinal: Negative.    Endocrine: Negative.    Genitourinary: Negative.    Musculoskeletal: Negative.         Patient still complains of some weakness on the left upper and lower extremity patient is right-handed   Skin: Negative.    Neurological:         S/p CVA with left-sided weakness which is improving ambulatory no headache no dizziness has  been followed up by neurology     Objective   Physical Exam  Vitals and nursing note reviewed.   HENT:      Head: Normocephalic and atraumatic.      Nose: Nose normal.      Comments: Can smell and taste food     Neck: Normal range of motion and neck supple.   Eyes:      Extraocular Movements: Extraocular movements intact.      Pupils: Pupils are equal, round, and  reactive to light.      Comments: Resolving conjunctival irritation left eye patient is on erythromycin eyedrops and artificial eyedrops does not wear contacts vision is normal patient wears glasses still complains of irritation in the left eye patient is referred to ophthalmology   Cardiovascular:      Rate and Rhythm: Normal rate and regular rhythm.      Comments: Regular no chest pain blood pressure after rest was 90/60 patient is on lisinopril HCT 10/12.5 daily and nifedipine 90 mg daily patient advised to decrease nifedipine to 60 mg daily continue lisinopril HCT monitor blood pressure closely follow-up with us as needed or in 1 month  Pulmonary:      Effort: Pulmonary effort is normal.      Breath sounds: Normal breath sounds.      Comments: Clear no wheezing no shortness of breath non-smoker  Abdominal:      General: Abdomen is flat.      Palpations: Abdomen is soft.      Comments: Soft no masses no tenderness no nausea no heartburn   Genitourinary:     Comments: Recently had BMP which shows improved kidney function creatinine slightly elevated  GFR of 77 normal kidney function patient advised to continue drinking more fluids and avoid NSAIDs continue present medicines  Musculoskeletal:         General: Normal range of motion.      Comments: S/p CVA with left-sided weakness gait is normal ambulatory still complains of some left-sided weakness patient is right-handed gait is normal   Skin:     General: Skin is warm.   Neurological:      General: No focal deficit present.      Comments: Alert oriented s/p CVA with left-sided weakness patient is ambulatory gait is normal does not use a cane follows up with neurology       Assessment   Problem List Items Addressed This Visit          Cardiac and Vasculature    Essential hypertension - Primary    Relevant Medications    NIFEdipine CC (ADALAT CC) 60 MG 24 hr tablet    Mixed hyperlipidemia    Relevant Medications    NIFEdipine CC (ADALAT CC) 60 MG 24 hr tablet     Other Relevant Orders    Lipid Panel With Reflex       Genitourinary and Reproductive    Stage 3a chronic kidney disease (CMD)    Relevant Orders    Basic Metabolic Panel       Neuro    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

## 2023-12-25 NOTE — DISCHARGE NOTE PROVIDER - NSDCCPCAREPLAN_GEN_ALL_CORE_FT
[FreeTextEntry1] : 70 year old male with pmhx of chronic left sided neck pain and left sided facial pain. Neck pain is worse with movement and activity, and facial pain is associated with reported constant cluster migraine headaches, nausea, and mood changes. He was recently prescribed Lithium which have improved symptoms, although he continues to feel pain. Patient has tried pain medications such as Botox injections, diclofenac, prednisone, and currently tapering off suboxone with continued pain. Cervical MRI from 11/28/23 reviewed today with patient, findings include C3-C4 disc herniation indenting the right ventral thecal sac, C6-C7 autofusion likely due to disc degeneration, and Cervical spinal stenosis. Given history, physical examination, and MRI findings, likely symptoms of left sided headache and facial pain due to cervicogenic migraine. Recommend Left Cervical Medial Branch Block at C2-C3 and C3-C4. Also discussed possible benefit of cervical epidural steroid injection depending on results of CMBB.   The potential benefits as well as rare but possible risks were reviewed with the patient.  These risks including infection including epidural abscess, meningitis, osteomyelitis, and discitis, bleeding including epidural hematoma, nerve injury, paralysis, failure to relieve pain or worse pain, headache, pneumothorax, elevated blood sugars, allergic reactions, adverse reactions to medications, vasovagal reactions, falls, etc. Following that discussion, we decided together that the potential benefits outweigh the potential risks and we decided to proceed with scheduling the procedure.  I answered all the patient's questions about the procedure including the technical details of the procedure and also offered that if any other questions arise we will also be happy to answer those on the day of the procedure. All questions today were answered to the patients satisfaction, and the patient stated their verbal understanding.    Plan: - CMBB C2-C4 today. Will plan for second CMBB if patient reports significant relief. Goal is to proceed with Cervical Radiofrequency Ablation with two successful MBB - Follow up in 2 weeks  ------------------------  Cervical Medial Branch Block  (C2-4) left  The potential benefits as well as rare but possible risks were reviewed with the patient.  These risks including infection including epidural abscess, meningitis, osteomyelitis, and discitis, bleeding including epidural hematoma, nerve injury, paralysis, failure to relieve pain or worse pain, headache, pneumothorax, elevated blood sugars, allergic reactions, adverse reactions to medications, vasovagal reactions, falls, etc. Following that discussion, all questions were again answered to the patients satisfaction, the patient stated their verbal understanding and written consent was obtained.    After obtaining consent, pre-procedure blood pressure and pulse were recorded and are in the nursing record for review. The patient was placed in the supine position. The respective cervical area was prepped with chloroprep and draped in sterile fashion. Lateral fluoroscopy was used during the procedure  A 25 gauge 1.5 inch needle was inserted into the target medial branch nerve under fluoroscopic guidance. No paresthesias were elicited with needle placement and aspiration was negative for blood and CSF. Next 1 ml of a Solution of 2 ml Bupivacaine 0.25% and 10 mg Dexamethasone was injected.   The identical procedure was performed at the remaining levels. The skin was cleansed, and a sterile bandage was applied. Following the procedure, the patient's vital signs were stable. The patient tolerated the procedure well and no complications were encountered. The patient was discharged home in good condition with post-procedural instructions.  Time Out: Immediately prior to the procedure, the following was verbally confirmed that there is a consent form and that the correct patient, planned procedure, site and side are consistent with documentation and that necessary equipment and/or blood products are available prior to the start of the case.  Complications: none EBL: <5 cc 
PRINCIPAL DISCHARGE DIAGNOSIS  Diagnosis: Anemia  Assessment and Plan of Treatment: likely due to GI bleed.  You were sent to [facility] with low blood count requiring blood transfusions of 2 units blood.  You responded appropriately to transfusions and are noted with stable blood count with no signs and symptoms of GI bleed.  -Follow up with your primary doctor for continued blood counts monitoring.  No further work up needed as you completed extensive GI work up recently.  -Take medications as prescribed  -Avoid NSAIDs      SECONDARY DISCHARGE DIAGNOSES  Diagnosis: ESRD on hemodialysis  Assessment and Plan of Treatment: Continue T/Th/S hempdialysis as outpatient.  Follow renal diet  Take medications as prescribed  Follow up with nephrology    Diagnosis: COPD without exacerbation  Assessment and Plan of Treatment: Your COPD is well controlled on current management.  Continue COPD management as prior to hospitalization

## 2023-12-28 NOTE — H&P ADULT - NSHPPHYSICALEXAM_GEN_ALL_CORE
LOS:     VITALS:   T(C): --  HR: 104 (12-28-23 @ 17:30) (100 - 125)  BP: 147/61 (12-28-23 @ 17:30) (68/33 - 179/80)  RR: 19 (12-28-23 @ 17:30) (18 - 20)  SpO2: 98% (12-28-23 @ 17:30) (89% - 100%)    GENERAL: NAD, lying in bed comfortably  HEAD:  Atraumatic, Normocephalic  EYES: EOMI, PERRLA, conjunctiva and sclera clear  ENT: ETT in place  NECK: Supple, No JVD  CHEST/LUNG: Clear to auscultation bilaterally; No rales, rhonchi, wheezing, or rubs. Unlabored respirations; in synchronic with the ventilator   HEART: Tachycardic,  Regular rate and rhythm; No murmurs, rubs, or gallops  ABDOMEN: BSx4; Soft, nontender, nondistended  EXTREMITIES:  2+ Peripheral Pulses, brisk capillary refill. No clubbing, cyanosis, or edema; amputated LLE bellow the knee; L AV fistula with good thrill   NERVOUS SYSTEM:  A&OX0  SKIN: No rashes or lesions

## 2023-12-28 NOTE — CHART NOTE - NSCHARTNOTEFT_GEN_A_CORE
Patient with ESRD. Missed HD on Tuesday and is due for HD Today  In ED short of breath and with hypoxia  will arrange emergent HD today.  Informed consent in the chart Patient with ESRD. Missed HD Saturday, reported to dialysis clinic on tuesday but only stayed for 20minutes.  In ED short of breath and with hypoxia  Informed consent in the chart  emergent HD arranged but patient became hypoxic, thus intubated.  Hypotensive in HD unit, levophed via peripheral line started   Patient had bradycardia with asystole ACLS protocol initiated by MICU team.

## 2023-12-28 NOTE — ED PROCEDURE NOTE - CPROC ED TIME OUT STATEMENT1
“Patient's name, , procedure and correct site were confirmed during the [place] Timeout.” “Patient's name, , procedure and correct site were confirmed during the [place] Timeout.”

## 2023-12-28 NOTE — ED ADULT NURSE REASSESSMENT NOTE - NS ED NURSE REASSESS COMMENT FT1
Intubated @ room 9, 23cm/7.5F/ right with MD Reyna.  Etomidate 20mg/Rocc 60 mg via IV administered by MD Reyna.

## 2023-12-28 NOTE — ED PROVIDER NOTE - PROGRESS NOTE DETAILS
Mouna Little DO PGY-3  Attempted to reach [facility] [phone] for collateral with no answer Mouna Little DO PGY-3  Attempted to reach [facility] [phone] for collateral with no answer Mouna Little DO PGY-3  Consulted with nephrology Dr. Gross who reports that patient missed dialysis on Tuesday, frequently misses dialysis. Did not receive dialysis today. Will see patient in the ER. Will arrange for dialysis. Patient had episode of desaturations initially responded to nonrebreather and became more agitated and was pulling at lines.  Then proceeded to have another episode of hypoxia and not responsive to nonrebreather.  Attempted BiPAP with 100% oxygen with appropriate rise in O2 sat but patient pulling at mask and not tolerating.  Patient intubated for airway protection and hypoxia.  Consulted ICU attending intensivist Dr. Ford who evaluated patient at bedside and will admit to his service.

## 2023-12-28 NOTE — ED ADULT NURSE NOTE - NSFALLHARMRISKINTERV_ED_ALL_ED
Assistance OOB with selected safe patient handling equipment if applicable/Assistance with ambulation/Communicate risk of Fall with Harm to all staff, patient, and family/Monitor gait and stability/Provide visual cue: red socks, yellow wristband, yellow gown, etc/Reinforce activity limits and safety measures with patient and family/Use of alarms - bed, stretcher, chair and/or video monitoring/Bed in lowest position, wheels locked, appropriate side rails in place/Call bell, personal items and telephone in reach/Instruct patient to call for assistance before getting out of bed/chair/stretcher/Non-slip footwear applied when patient is off stretcher/[place] to call system/Physically safe environment - no spills, clutter or unnecessary equipment/Purposeful Proactive Rounding/Room/bathroom lighting operational, light cord in reach Assistance OOB with selected safe patient handling equipment if applicable/Assistance with ambulation/Communicate risk of Fall with Harm to all staff, patient, and family/Monitor gait and stability/Provide visual cue: red socks, yellow wristband, yellow gown, etc/Reinforce activity limits and safety measures with patient and family/Use of alarms - bed, stretcher, chair and/or video monitoring/Bed in lowest position, wheels locked, appropriate side rails in place/Call bell, personal items and telephone in reach/Instruct patient to call for assistance before getting out of bed/chair/stretcher/Non-slip footwear applied when patient is off stretcher/[place] to call system/Physically safe environment - no spills, clutter or unnecessary equipment/Purposeful Proactive Rounding/Room/bathroom lighting operational, light cord in reach

## 2023-12-28 NOTE — ED ADULT NURSE REASSESSMENT NOTE - NS ED NURSE REASSESS COMMENT FT1
pt refused blood work and EKG, uncooperative at this time, notified MD Little who made aware of this matter, pt stated that "get off me". not allow us do anything.

## 2023-12-28 NOTE — CHART NOTE - NSCHARTNOTEFT_GEN_A_CORE
CODE BLUE:  Patient was in the dialysis unit, found bradycardic to the 40's on cardiac monitor, pulseless on assessment. ACLS protocol was initiated at 7:25 PM.  Pt had been intubated prior to be started on HD. Patient received Epi x  4, Calcium Gluconate X 1,  bicarb amps X3.  During each pulse check patient found in V-fib, defibrillated X 4. Pt received amiodarone 300 mg followed by 150 mg as he had persisted in V-fib.  Pt also received Lidocaine 100 mg.  Given futility of further interventions, unable to obtain ROSC, resuscitative measures were stopped at 7:43 PM. Pt's brother Mr. Georgi King was notified over the phone.

## 2023-12-28 NOTE — H&P ADULT - ATTENDING COMMENTS
63M visually imparied with history of ESRD on dialysis T/TH/Sat, HTN, NIDDM, hypothyroidism, GERD, COPD, presents with report of coffee-ground emesis. In the ED noted to be in respiratory distress and as such intubated. Seen on mechanical ventilation. As per bedside, patient with brown colored emesis with gastric contents.     Assessment  1. Acute hypoxic respiratory failure  2. Pulmonary edema  3. ESRD   4. Chronic pancytopenia   5. Hypertension   6. DM type 2    Plan  - Cont. mechanical ventilation  - Sedation for vent synchrony  - Monitor respiratory status and hemodynamics  - Plan for HD Today   - Pain control  - Glucose monitoring with insulin coverage for hyperglycemia  - Has history of chronic pancytopenia, will monitor for now  - Cont. chronic HD meds  - Nephrology consult  - DVT and stress ulcer prophylaxis  - Admit to ICU  - Discussed with Patient's brother Georgi and updated about admission to ICU 63M visually imparied with history of ESRD on dialysis T/TH/Sat, HTN, NIDDM, hypothyroidism, GERD, COPD, presents with report of coffee-ground emesis. In the ED noted to be in respiratory distress and as such intubated. Seen on mechanical ventilation. As per bedside, patient with brown colored emesis with gastric contents.     Assessment  1. Acute hypoxic respiratory failure  2. Pulmonary edema  3. ESRD   4. Chronic pancytopenia   5. Hypertension   6. DM type 2    Plan  - Cont. mechanical ventilation  - Sedation for vent synchrony  - Check ABG  - Check RVP  - Monitor respiratory status and hemodynamics  - Plan for HD Today   - Pain control  - Glucose monitoring with insulin coverage for hyperglycemia  - Has history of chronic pancytopenia, will monitor for now  - Cont. chronic HD meds  - Nephrology consult  - DVT and stress ulcer prophylaxis  - Admit to ICU  - Discussed with Patient's brother Georgi and updated about admission to ICU

## 2023-12-28 NOTE — H&P ADULT - ASSESSMENT
COMPLETE NOTE TO FOLLOW  64 yo M from [facility], with PMHx of ESRD ([place], T/Th/Sat), HTN, DM, Hypothyroidism, GERD, COPD, visually impaired, hx of coffee ground emesis d/christy on 12/6 with recommendation to undergo EGD as OP, came back with c/o recurrent cofee ground emesis. Missed HD since Tuesday. Became progressively hypoxic and agitated  in the ED, intubated for hypoxia and airway protection.     ===============CNS===========  AAO X0   currently sedated on propofol  keep RASS 0 to -1      ===============CV=============  #x HTN  pt on metoprolol and amlodipine  will not resume anti-hypertensive medications for now given pt currently on propofol and will be dialized today  monitor BP    ==============Pulm===============  #AHRF  2/2 fluid overload in the s/o missed HD  currently intubated  HD today as per nephro  f/u ABG    # hx COPD  not on exacerbation     ==============GI==============  #Concern for GIB  no evidence of active bleeding while in the ED  trend CBC q 6 hrs  CBC q 6 hrs  keep active type and screen   GI Dr. Bergman       =============Renal============  # CKD stage V  plan for HD today   resume phos-binders once NGT is placed  Nephro Dr. Mosher    ============Endo==========  #DM  pt on insulin at home  started on low SSI q 6 hrs while NPO  FS Q 6 hrs  adjust regimen as needed    Hypothyroidism  at home on levothyroxine 25 mcg  started on 18.75 mcg IV while NPO     ============ID=============  No active issues    ============PPx============  SCD  holding pharmacological DVT prophylaxis in the s/o concern for GIB  PPx                   62 yo M from [facility], with PMHx of ESRD ([place], T/Th/Sat), HTN, DM, Hypothyroidism, GERD, COPD, visually impaired, hx of coffee ground emesis d/christy on 12/6 with recommendation to undergo EGD as OP, came back with c/o recurrent cofee ground emesis. Missed HD since Tuesday. Became progressively hypoxic and agitated  in the ED, intubated for hypoxia and airway protection.     ===============CNS===========  AAO X0   currently sedated on propofol  keep RASS 0 to -1      ===============CV=============  #x HTN  pt on metoprolol and amlodipine  will not resume anti-hypertensive medications for now given pt currently on propofol and will be dialized today  monitor BP    ==============Pulm===============  #AHRF  2/2 fluid overload in the s/o missed HD  currently intubated  HD today as per nephro  f/u ABG    # hx COPD  not on exacerbation     ==============GI==============  #Concern for GIB  no evidence of active bleeding while in the ED  trend CBC q 6 hrs  CBC q 6 hrs  keep active type and screen   GI Dr. Bergman       =============Renal============  # CKD stage V  plan for HD today   resume phos-binders once NGT is placed  Nephro Dr. Mosher    ============Endo==========  #DM  pt on insulin at home  started on low SSI q 6 hrs while NPO  FS Q 6 hrs  adjust regimen as needed    Hypothyroidism  at home on levothyroxine 25 mcg  started on 18.75 mcg IV while NPO     ============ID=============  No active issues    ============PPx============  SCD  holding pharmacological DVT prophylaxis in the s/o concern for GIB  PPx

## 2023-12-28 NOTE — DISCHARGE NOTE FOR THE EXPIRED PATIENT - HOSPITAL COURSE
62 yo M from [facility], with PMHx of ESRD ([place], T/Th/Sat), HTN, DM, Hypothyroidism, GERD, COPD, visually impaired, hx of coffee ground emesis d/christy on 12/6 with recommendation to undergo EGD as OP, came back with c/o recurrent cofee ground emesis. Missed HD since Tuesday. Became progressively hypoxic and agitated  in the ED, intubated for hypoxia and airway protection. Pt was admitted to ICU s/p intubation and for AHRF 2/2 fluid overload in setting of missed HD. Nephrology Dr. Mosher following, HD session started today. GI Dr. Bergman was consulted for concern for GI bleed, however there was no active signs of bleeding. During HD, pt was found to be bradycardic to the 40's on cardiac monitor, pulseless on assessment. ACLS protocol was initiated at 7:25 PM.  Pt had been intubated prior to be started on HD. Patient received Epi x  4, Calcium Gluconate X 1,  bicarb amps X3.  During each pulse check patient found in V-fib, defibrillated X 4. Pt received amiodarone 300 mg followed by 150 mg as he had persisted in V-fib.  Pt also received Lidocaine 100 mg.  Given futility of further interventions, unable to obtain ROSC, resuscitative measures were stopped at 7:43 PM.         62 yo M from [facility], with PMHx of ESRD ([place], T/Th/Sat), HTN, DM, Hypothyroidism, GERD, COPD, visually impaired, hx of coffee ground emesis d/christy on 12/6 with recommendation to undergo EGD as OP, came back with c/o recurrent cofee ground emesis. Missed HD since Tuesday. Became progressively hypoxic and agitated  in the ED, intubated for hypoxia and airway protection. Pt was admitted to ICU s/p intubation and for AHRF 2/2 fluid overload in setting of missed HD. Nephrology Dr. Mosher following, HD session started today. GI Dr. Bergman was consulted for concern for GI bleed, however there was no active signs of bleeding. During HD, pt was found to be bradycardic to the 40's on cardiac monitor, pulseless on assessment. ACLS protocol was initiated at 7:25 PM.  Pt had been intubated prior to be started on HD. Patient received Epi x  4, Calcium Gluconate X 1,  bicarb amps X3.  During each pulse check patient found in V-fib, defibrillated X 4. Pt received amiodarone 300 mg followed by 150 mg as he had persisted in V-fib.  Pt also received Lidocaine 100 mg.  Given futility of further interventions, unable to obtain ROSC, resuscitative measures were stopped at 7:43 PM.

## 2023-12-28 NOTE — ED ADULT TRIAGE NOTE - NURSING HOMES
[facility], [facility], [facility]

## 2023-12-28 NOTE — ED ADULT NURSE NOTE - OBJECTIVE STATEMENT
pt is here for vomiting with blood. 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

## 2023-12-28 NOTE — PHARMACOTHERAPY INTERVENTION NOTE - COMMENTS
Patient is from [facility] and their medical record was used to update the outpatient medication review. Patient is from [facility] and their medical record was used to update the outpatient medication review.

## 2023-12-28 NOTE — ED PROVIDER NOTE - ATTENDING CONTRIBUTION TO CARE
Patient recently admitted for coffee-ground emesis evaluated by GI who recommended outpatient EGD for presumptive gastritis/Ethel-Renee tears.  Now having recurrent coffee-ground emesis sent from facility.  Tachycardic in ED.  Will readmit for repeat GI evaluation for possible upper GI bleed. Patient recently admitted for coffee-ground emesis evaluated by GI who recommended outpatient EGD for presumptive gastritis/Ethel-Renee tears.  Now having recurrent coffee-ground emesis sent from facility.  Tachycardic in ED.  Will readmit for repeat GI evaluation for possible upper GI bleed.    Patient progressed to having respiratory failure requiring intubation likely secondary to non compliance with dialysis, admitted to ICU.

## 2023-12-28 NOTE — ED PROVIDER NOTE - CLINICAL SUMMARY MEDICAL DECISION MAKING FREE TEXT BOX
Mouna Little DO PGY-3  HPI:   63 year old male from [facility], with PMHx of ESRD ([place], T/Th/Sat), HTN, DM, Hypothyroidism, GERD, COPD, recent admission 3 weeks ago for coffee ground emesis presents to the ED with 1 day of multiple episodes of coffee ground emesis starting after receiving dialysis today. Patient reports epigastric abdominal pain. Denies chest pain, shortness of breath, diarrhea, bloody stools.    ROS:   Denies fever, chills, chest pain, shortness of breath, diarrhea, dysuria, hematuria    PHYSICAL EXAM:  CONSTITUTIONAL: Chronically ill appearing, awake, alert, oriented to person, place, time/situation, retching.  HEAD: Atraumatic, no step offs.  NECK: Supple, no meningismus.   EYES: Clear bilaterally, pupils equal, round and reactive to light.  ENMT: Airway patent, Nasal mucosa clear. Mouth with normal mucosa. Uvula is midline.   CARDIAC: Tachycardic rate, regular rhythm. +S1/S2. No murmurs, rubs or gallops.  RESPIRATORY: Breathing unlabored.   ABDOMEN:  Epigastric abdominal tenderness to palpation. Abdomen soft, nontender, nondistended. .  NEUROLOGICAL: Alert and oriented, no focal deficits, no motor or sensory deficits.   MSK: No clubbing, cyanosis, or edema.   SKIN: Skin warm and dry.     MDM:   63 year old male from [facility], with PMHx of ESRD ([place], T/Th/Sat), HTN, DM, Hypothyroidism, GERD, COPD presents to the ED with multiple episodes of coffee ground emesis starting after receiving dialysis today associated with epigastric pain.  Arrived tachycardic to 120s, hypertensive to 180s, satting 95% on room air.  Differential includes but is not limited to UGIB, anemia, gastritis, viral syndrome  Plan to obtain labs, give IV PPI, treat pain/nausea, and re-assess.   Prior EKGs from 3 weeks ago demonstrated QT prolongation. Patient requiring sedation/agitated and pulling at monitors/staff, will give small dose of diazepam and re-assess. Mouna Little DO PGY-3  HPI:   63 year old male from [facility], with PMHx of ESRD ([place], T/Th/Sat), HTN, DM, Hypothyroidism, GERD, COPD, recent admission 3 weeks ago for coffee ground emesis presents to the ED with 1 day of multiple episodes of coffee ground emesis starting after receiving dialysis today. Patient reports epigastric abdominal pain. Denies chest pain, shortness of breath, diarrhea, bloody stools.    ROS:   Denies fever, chills, chest pain, shortness of breath, diarrhea, dysuria, hematuria    PHYSICAL EXAM:  CONSTITUTIONAL: Chronically ill appearing, awake, alert, oriented to person, place, time/situation, retching.  HEAD: Atraumatic, no step offs.  NECK: Supple, no meningismus.   EYES: Clear bilaterally, pupils equal, round and reactive to light.  ENMT: Airway patent, Nasal mucosa clear. Mouth with normal mucosa. Uvula is midline.   CARDIAC: Tachycardic rate, regular rhythm. +S1/S2. No murmurs, rubs or gallops.  RESPIRATORY: Breathing unlabored.   ABDOMEN:  Epigastric abdominal tenderness to palpation. Abdomen soft, nontender, nondistended. .  NEUROLOGICAL: Alert and oriented, no focal deficits, no motor or sensory deficits.   MSK: No clubbing, cyanosis, or edema.   SKIN: Skin warm and dry.     MDM:   63 year old male from [facility], with PMHx of ESRD ([place], T/Th/Sat), HTN, DM, Hypothyroidism, GERD, COPD presents to the ED with multiple episodes of coffee ground emesis starting after receiving dialysis today associated with epigastric pain.  Arrived tachycardic to 120s, hypertensive to 180s, satting 95% on room air.  Differential includes but is not limited to UGIB, anemia, gastritis, viral syndrome  Plan to obtain labs, give IV PPI, treat pain/nausea, and re-assess.   Prior EKGs from 3 weeks ago demonstrated QT prolongation. Patient requiring sedation/agitated and pulling at monitors/staff, will give small dose of diazepam and re-assess. Mouna Little DO PGY-3  HPI:   63 year old male from [facility], with PMHx of ESRD ([place], T/Th/Sat), HTN, DM, Hypothyroidism, GERD, COPD, recent admission 3 weeks ago for coffee ground emesis presents to the ED BIBEMS with 1 day of multiple episodes of coffee ground emesis starting after receiving dialysis today. Patient reports epigastric abdominal pain. Denies chest pain, shortness of breath, diarrhea, bloody stools.     ROS:   Denies fever, chills, chest pain, shortness of breath, diarrhea, dysuria, hematuria    PHYSICAL EXAM:  CONSTITUTIONAL: Chronically ill appearing, awake, alert, oriented to person, place, time/situation, retching.  HEAD: Atraumatic, no step offs.  NECK: Supple, no meningismus.   EYES: Clear bilaterally, pupils equal, round and reactive to light.  ENMT: Airway patent, Nasal mucosa clear. Mouth with normal mucosa. Uvula is midline.   CARDIAC: Tachycardic rate, regular rhythm. +S1/S2. No murmurs, rubs or gallops.  RESPIRATORY: Breathing unlabored.   ABDOMEN:  Epigastric abdominal tenderness to palpation. Abdomen soft, nontender, nondistended. .  NEUROLOGICAL: Alert and oriented, no focal deficits, no motor or sensory deficits.   MSK: No clubbing, cyanosis, or edema.   SKIN: Skin warm and dry.     MDM:   63 year old male from [facility], with PMHx of ESRD ([place], T/Th/Sat), HTN, DM, Hypothyroidism, GERD, COPD presents to the ED with multiple episodes of coffee ground emesis starting after receiving dialysis today associated with epigastric pain.  Arrived tachycardic to 120s, hypertensive to 180s, satting 95% on room air.  Differential includes but is not limited to UGIB, anemia, gastritis, viral syndrome  Plan to obtain labs, give IV PPI, treat pain/nausea, and re-assess.   Prior EKGs from 3 weeks ago demonstrated QT prolongation. Patient requiring sedation/agitated and pulling at monitors/staff, will give small dose of diazepam and re-assess. Mouna Little DO PGY-3  HPI:   63 year old male from [facility], with PMHx of ESRD ([place], T/Th/Sat), HTN, DM, Hypothyroidism, GERD, COPD, recent admission 3 weeks ago for coffee ground emesis presents to the ED BIBEMS with 1 day of multiple episodes of coffee ground emesis starting after receiving dialysis today. Patient reports epigastric abdominal pain. Denies chest pain, shortness of breath, diarrhea, bloody stools.     ROS:   Denies fever, chills, chest pain, shortness of breath, diarrhea, dysuria, hematuria    PHYSICAL EXAM:  CONSTITUTIONAL: Chronically ill appearing, awake, alert, oriented to person, place, time/situation, retching.  HEAD: Atraumatic, no step offs.  NECK: Supple, no meningismus.   EYES: Clear bilaterally, pupils equal, round and reactive to light.  ENMT: Airway patent, Nasal mucosa clear. Mouth with normal mucosa. Uvula is midline.   CARDIAC: Tachycardic rate, regular rhythm. +S1/S2. No murmurs, rubs or gallops.  RESPIRATORY: Breathing unlabored.   ABDOMEN:  Epigastric abdominal tenderness to palpation. Abdomen soft, nontender, nondistended. .  NEUROLOGICAL: Alert and oriented, no focal deficits, no motor or sensory deficits.   MSK: No clubbing, cyanosis, or edema.   SKIN: Skin warm and dry.     MDM:   63 year old male from [facility], with PMHx of ESRD ([place], T/Th/Sat), HTN, DM, Hypothyroidism, GERD, COPD presents to the ED with multiple episodes of coffee ground emesis starting after receiving dialysis today associated with epigastric pain.  Arrived tachycardic to 120s, hypertensive to 180s, satting 95% on room air.  Differential includes but is not limited to UGIB, anemia, gastritis, viral syndrome  Plan to obtain labs, give IV PPI, treat pain/nausea, and re-assess.   Prior EKGs from 3 weeks ago demonstrated QT prolongation. Patient requiring sedation/agitated and pulling at monitors/staff, will give small dose of diazepam and re-assess.

## 2023-12-28 NOTE — H&P ADULT - HISTORY OF PRESENT ILLNESS
COMPLETE NOTE TO FOLLOW  63 year old male from [facility], with PMHx of ESRD ([place], T/Th/Sat), HTN, DM, Hypothyroidism, GERD, COPD, visually impaired, who was recently admitted to [facility] for coffee ground emesis, recommended to have OP EGD, he was EMS due to coffee ground emesis associated with abdominal pain. While in the ED pt with episodes of desaturation,  initially responded to NRB, however, pt later desaturated again was trialed on BIPAP, but he was removing the mask. Patient was intubated for airway protection and hypoxia. As per documentation  pt missed HD on Tuesday and did not receive HD today. Unable to obtain ROS given pt's current MS   63 year old male from [facility], with PMHx of ESRD ([place], T/Th/Sat), HTN, DM, Hypothyroidism, GERD, COPD, visually impaired, who was recently admitted to [facility] for coffee ground emesis, recommended to have OP EGD, he was EMS due to coffee ground emesis associated with abdominal pain. While in the ED pt with episodes of desaturation,  initially responded to NRB, however, pt later desaturated again was trialed on BIPAP, but he was removing the mask. Patient was intubated for airway protection and hypoxia. As per documentation  pt missed HD on Tuesday and did not receive HD today. Unable to obtain ROS given pt's current MS

## 2023-12-29 LAB
A BAUMANNII DNA SPEC QL NAA+PROBE: SIGNIFICANT CHANGE UP
A BAUMANNII DNA SPEC QL NAA+PROBE: SIGNIFICANT CHANGE UP
CALCIUM SERPL-MCNC: 6.2 MG/DL — CRITICAL LOW (ref 8.4–10.5)
CALCIUM SERPL-MCNC: 6.2 MG/DL — CRITICAL LOW (ref 8.4–10.5)
GAS PNL BLDA: SIGNIFICANT CHANGE UP
GAS PNL BLDA: SIGNIFICANT CHANGE UP
GRAM STN FLD: ABNORMAL
METHOD TYPE: SIGNIFICANT CHANGE UP
METHOD TYPE: SIGNIFICANT CHANGE UP
PTH-INTACT FLD-MCNC: 294 PG/ML — HIGH (ref 15–65)
PTH-INTACT FLD-MCNC: 294 PG/ML — HIGH (ref 15–65)
SPECIMEN SOURCE: SIGNIFICANT CHANGE UP

## 2023-12-29 PROCEDURE — 86901 BLOOD TYPING SEROLOGIC RH(D): CPT

## 2023-12-29 PROCEDURE — 87040 BLOOD CULTURE FOR BACTERIA: CPT

## 2023-12-29 PROCEDURE — 87077 CULTURE AEROBIC IDENTIFY: CPT

## 2023-12-29 PROCEDURE — 82962 GLUCOSE BLOOD TEST: CPT

## 2023-12-29 PROCEDURE — 83970 ASSAY OF PARATHORMONE: CPT

## 2023-12-29 PROCEDURE — 83605 ASSAY OF LACTIC ACID: CPT

## 2023-12-29 PROCEDURE — 84132 ASSAY OF SERUM POTASSIUM: CPT

## 2023-12-29 PROCEDURE — 87186 SC STD MICRODIL/AGAR DIL: CPT

## 2023-12-29 PROCEDURE — 85025 COMPLETE CBC W/AUTO DIFF WBC: CPT

## 2023-12-29 PROCEDURE — 93005 ELECTROCARDIOGRAM TRACING: CPT

## 2023-12-29 PROCEDURE — 71045 X-RAY EXAM CHEST 1 VIEW: CPT

## 2023-12-29 PROCEDURE — 87150 DNA/RNA AMPLIFIED PROBE: CPT

## 2023-12-29 PROCEDURE — 96374 THER/PROPH/DIAG INJ IV PUSH: CPT

## 2023-12-29 PROCEDURE — 82310 ASSAY OF CALCIUM: CPT

## 2023-12-29 PROCEDURE — 0225U NFCT DS DNA&RNA 21 SARSCOV2: CPT

## 2023-12-29 PROCEDURE — 85730 THROMBOPLASTIN TIME PARTIAL: CPT

## 2023-12-29 PROCEDURE — 86850 RBC ANTIBODY SCREEN: CPT

## 2023-12-29 PROCEDURE — 84295 ASSAY OF SERUM SODIUM: CPT

## 2023-12-29 PROCEDURE — 36415 COLL VENOUS BLD VENIPUNCTURE: CPT

## 2023-12-29 PROCEDURE — 99261: CPT

## 2023-12-29 PROCEDURE — 82330 ASSAY OF CALCIUM: CPT

## 2023-12-29 PROCEDURE — 86900 BLOOD TYPING SEROLOGIC ABO: CPT

## 2023-12-29 PROCEDURE — 99291 CRITICAL CARE FIRST HOUR: CPT

## 2023-12-29 PROCEDURE — 82803 BLOOD GASES ANY COMBINATION: CPT

## 2023-12-29 PROCEDURE — 96375 TX/PRO/DX INJ NEW DRUG ADDON: CPT

## 2023-12-29 PROCEDURE — 96372 THER/PROPH/DIAG INJ SC/IM: CPT | Mod: XU

## 2023-12-29 PROCEDURE — 83690 ASSAY OF LIPASE: CPT

## 2023-12-29 PROCEDURE — 94002 VENT MGMT INPAT INIT DAY: CPT

## 2023-12-29 PROCEDURE — 85610 PROTHROMBIN TIME: CPT

## 2023-12-29 PROCEDURE — 87184 SC STD DISK METHOD PER PLATE: CPT

## 2023-12-29 PROCEDURE — 31500 INSERT EMERGENCY AIRWAY: CPT

## 2023-12-29 PROCEDURE — 80053 COMPREHEN METABOLIC PANEL: CPT

## 2023-12-29 NOTE — PROGRESS NOTE ADULT - SUBJECTIVE AND OBJECTIVE BOX
Patient is a 62y old  Male who presents with a chief complaint of Hypervolemia     (02 Aug 2023 09:50)    PATIENT IS SEEN AND EXAMINED IN MEDICAL FLOOR.  MARIIT [    ]    JEREMY [   ]      GT [   ]    ALLERGIES:  No Known Drug Allergies  fish (Rash)  liver (Anaphylaxis)      Daily     Daily Weight in k.3 (01 Aug 2023 19:03)    VITALS:    Vital Signs Last 24 Hrs  T(C): 37.3 (02 Aug 2023 05:36), Max: 37.3 (02 Aug 2023 05:36)  T(F): 99.2 (02 Aug 2023 05:36), Max: 99.2 (02 Aug 2023 05:36)  HR: 97 (02 Aug 2023 05:36) (91 - 97)  BP: 160/67 (02 Aug 2023 05:36) (139/80 - 163/71)  BP(mean): --  RR: 18 (02 Aug 2023 05:36) (17 - 20)  SpO2: 97% (02 Aug 2023 02:17) (96% - 97%)    Parameters below as of 02 Aug 2023 05:36  Patient On (Oxygen Delivery Method): nasal cannula  O2 Flow (L/min): 2      LABS:    CBC Full  -  ( 01 Aug 2023 17:05 )  WBC Count : 5.34 K/uL  RBC Count : 2.73 M/uL  Hemoglobin : 7.3 g/dL  Hematocrit : 23.4 %  Platelet Count - Automated : 116 K/uL  Mean Cell Volume : 85.7 fl  Mean Cell Hemoglobin : 26.7 pg  Mean Cell Hemoglobin Concentration : 31.2 gm/dL  Auto Neutrophil # : x  Auto Lymphocyte # : x  Auto Monocyte # : x  Auto Eosinophil # : x  Auto Basophil # : x  Auto Neutrophil % : x  Auto Lymphocyte % : x  Auto Monocyte % : x  Auto Eosinophil % : x  Auto Basophil % : x      08-    139  |  101  |  101<H>  ----------------------------<  86  4.4   |  23  |  16.10<H>    Ca    8.3<L>      01 Aug 2023 17:05  Phos  7.1     08-01  Mg     3.1     08-    TPro  6.6  /  Alb  2.9<L>  /  TBili  0.8  /  DBili  x   /  AST  45<H>  /  ALT  42  /  AlkPhos  73  08-    CAPILLARY BLOOD GLUCOSE      POCT Blood Glucose.: 75 mg/dL (02 Aug 2023 06:49)  POCT Blood Glucose.: 82 mg/dL (02 Aug 2023 02:13)  POCT Blood Glucose.: 77 mg/dL (01 Aug 2023 23:46)  POCT Blood Glucose.: 81 mg/dL (01 Aug 2023 21:43)  POCT Blood Glucose.: 79 mg/dL (01 Aug 2023 16:04)  POCT Blood Glucose.: 79 mg/dL (01 Aug 2023 12:05)        LIVER FUNCTIONS - ( 01 Aug 2023 17:05 )  Alb: 2.9 g/dL / Pro: 6.6 g/dL / ALK PHOS: 73 U/L / ALT: 42 U/L DA / AST: 45 U/L / GGT: x           Creatinine Trend: 16.10<--, 13.80<--, 11.30<--, 16.60<--, 15.60<--, 15.80<--  I&O's Summary          .Blood Blood-Peripheral   @ 14:07   No Growth Final  --  --          MEDICATIONS:    MEDICATIONS  (STANDING):  albuterol/ipratropium for Nebulization 3 milliLiter(s) Nebulizer every 6 hours  amLODIPine   Tablet 10 milliGRAM(s) Oral daily  aspirin enteric coated 81 milliGRAM(s) Oral daily  atorvastatin 40 milliGRAM(s) Oral at bedtime  budesonide 160 MICROgram(s)/formoterol 4.5 MICROgram(s) Inhaler 2 Puff(s) Inhalation two times a day  cefepime   IVPB 1000 milliGRAM(s) IV Intermittent every 24 hours  chlorhexidine 2% Cloths 1 Application(s) Topical daily  dextrose 5%. 1000 milliLiter(s) (50 mL/Hr) IV Continuous <Continuous>  dextrose 5%. 1000 milliLiter(s) (100 mL/Hr) IV Continuous <Continuous>  dextrose 50% Injectable 25 Gram(s) IV Push once  dextrose 50% Injectable 12.5 Gram(s) IV Push once  dextrose 50% Injectable 12.5 milliLiter(s) IV Push once  dextrose 50% Injectable 25 Gram(s) IV Push once  epoetin beverley (PROCRIT) Injectable 98721 Unit(s) IV Push <User Schedule>  folic acid 1 milliGRAM(s) Oral daily  furosemide    Tablet 80 milliGRAM(s) Oral daily  glucagon  Injectable 1 milliGRAM(s) IntraMuscular once  hydrALAZINE 25 milliGRAM(s) Oral three times a day  insulin glargine Injectable (LANTUS) 4 Unit(s) SubCutaneous at bedtime  latanoprost 0.005% Ophthalmic Solution 1 Drop(s) Both EYES at bedtime  levothyroxine 25 MICROGram(s) Oral daily  LORazepam     Tablet 0.5 milliGRAM(s) Oral <User Schedule>  metoprolol succinate ER 50 milliGRAM(s) Oral daily  metroNIDAZOLE  IVPB 500 milliGRAM(s) IV Intermittent every 8 hours  pantoprazole  Injectable 40 milliGRAM(s) IV Push every 12 hours  sertraline 50 milliGRAM(s) Oral daily  sucralfate 1 Gram(s) Oral four times a day      MEDICATIONS  (PRN):  dextrose Oral Gel 15 Gram(s) Oral once PRN Blood Glucose LESS THAN 70 milliGRAM(s)/deciliter  haloperidol    Injectable 0.5 milliGRAM(s) IntraMuscular every 8 hours PRN Agitation  ondansetron Injectable 4 milliGRAM(s) IV Push every 6 hours PRN Nausea and/or Vomiting      REVIEW OF SYSTEMS:                           ALL ROS DONE [ X   ]    CONSTITUTIONAL:  LETHARGIC [   ], FEVER [   ], UNRESPONSIVE [   ]  CVS:  CP  [   ], SOB, [   ], PALPITATIONS [   ], DIZZYNESS [   ]  RS: COUGH [   ], SPUTUM [   ]  GI: ABDOMINAL PAIN [   ], NAUSEA [   ], VOMITINGS [   ], DIARRHEA [   ], CONSTIPATION [   ]  :  DYSURIA [   ], NOCTURIA [   ], INCREASED FREQUENCY [   ], DRIBLING [   ],  SKELETAL: PAINFUL JOINTS [   ], SWOLLEN JOINTS [   ], NECK ACHE [   ], LOW BACK ACHE [   ],  SKIN : ULCERS [   ], RASH [   ], ITCHING [   ]  CNS: HEAD ACHE [   ], DOUBLE VISION [   ], BLURRED VISION [   ], AMS / CONFUSION [   ], SEIZURES [   ], WEAKNESS [   ],TINGLING / NUMBNESS [   ]    PHYSICAL EXAMINATION:  GENERAL APPEARANCE: NO DISTRESS,    ANASARCA ++  HEENT:  NO PALLOR, NO  JVD,  NO   NODES, NECK SUPPLE  CVS: S1 +, S2 +,   RS: AEEB,  OCCASIONAL  RALES +,   CRACKLES+ AT LUNG BASES  ABD: SOFT, NT, NO, BS +  EXT: LEFT BKA  SKIN: WARM,   SKELETAL:  ROM ACCEPTABLE  CNS:  AAO X  3      RADIOLOGY :      ASSESSMENT :     Hypervolemia    No pertinent past medical history    Hypertension    Adrenal insufficiency    CKD (chronic kidney disease)    Anemia    Glaucoma    Coronary artery disease    HLD (hyperlipidemia)    Peripheral vascular disease    Spinal stenosis of lumbosacral region    Hyperparathyroidism    Diabetes mellitus    Diabetic neuropathy    Contracture of hand    Osteoarthritis    Osteoporosis    Vision loss of left eye    ESRD on hemodialysis    Cataract    BPH (benign prostatic hyperplasia)    UTI (urinary tract infection)    Bladder mass    H/O hematuria    Osteoporosis    Vision loss of right eye    Depression    Chronic GERD    Osteomyelitis of vertebra    CHF (congestive heart failure)    No significant past surgical history    Below knee amputation status, left    History of right cataract extraction    History of left cataract extraction    S/P arteriovenous (AV) fistula creation    H/O hematuria    H/O transurethral destruction of bladder lesion    History of excision of mass        PLAN:  HPI:  Patient is 62 year old male from [facility], walks with RW, with PMH of ESRD on HD (TTS), BKA- L w/prosthetic leg, DM, Left eye blindness, CHF, CAD, HTN, HLD, osteoporosis, bronchitis, current smoker, and anemia who presents with complaint of missed dialysis. Last HD . Pt states he often missed HD sessions.  patient states he missed this HD session due to mild pain in left leg, patient remains able to ambulate. Pt complains of right leg swelling and states he does not make any urine. Patient states he was having mild shortness of breath.  Pt denies any fever, chills, N/V/D, constipation, CP, numbness or tingling (2023 19:20)    # CASE DISCUSSED AT LENGTH WITH PATIENT'S BROTHER ARTEMIO @ [phone]. DISCUSSED REGARDING CURRENT CLINICAL CONDITION, RECOMMENDED EVALUATIONS AND INTERVENTIONS. ALL QUESTIONS ANSWERED. DISCUSSED THAT PROGNOSIS IS POOR/GRIM GIVEN UNDERLYING MEDICAL CONDITIONS. ALSO DISCUSSED THAT DESPITE VERBALIZING UNDERSTANDING OF MEDICAL CONDITIONS AND RECOMMENDED INTERVENTIONS - PATIENT PERSISTENTLY REMAINS NONCOMPLIANT. TEAM HAS OFFERED PATIENT EMOTIONAL SUPPORT AND OFFERED MEDICATION FOR MOOD. []    # PATIENT W/ HISTORY OF ROUTINE NONCOMPLIANCE W/ LIFE-SUSTAINING TREATMENT [HD], EVALUATIONS AND INTERVENTIONS - COUNSELLED AT LENGTH TO REMAIN COMPLIANT. D/W PATIENT THAT PROGNOSIS IS GRIM/POOR. HE VERBALIZED UNDERSTANDING. REGARDING GOC - PATIENT WISHES FOR FULL CODE. PALLIATIVE CARE CONSULTED. PSYCHIATRY CONSULTED.  - PATIENT IS ORIENTED TO PERSON, PLACE AND CIRCUMSTANCE. HE EXPRESSED THAT HE DOES GROW IMPATIENT DURING DIALYSIS SESSIONS AND HAS BEEN LEAVING SESSIONS SOONER THAN PRESCRIBED. IN DISCUSSION THAT RISKS OF DEFERRING COMPLETE HD - INCLUDE BUT ARE NOT LIMITED TO WORSENING CLINICAL CONDITION, ELECTROLYTE ABNORMALITIES, ARRHYTHMIA AND DEATH. HE VERBALIZED UNDERSTANDING. HE REFUSES TO CONSIDER A NURSING HOME WITH ONSITE HD.   - D/W PATIENT THAT REPEATEDLY REFUSING INTERVENTIONS AND ASSESSMENTS IS NOT IN LINE WITH HIS WISHES TO IMPROVE. PATIENT VERBALIZED UNDERSTANDING AND AGREEMENT. IN DISCUSSION, REGARDING POSSIBLE ETIOLOGY - PSYCHIATRY WAS CONSULTED TO DISCUSS MOOD, PATIENT DOES EXPRESS THAT HE HAS SOME DEPRESSION GIVEN HIS MEDICAL CONDITIONS. BUT HE DENIES THAT THIS IS THE ETIOLOGY FOR NONCOMPLIANCE. HE EXPLAINS THAT HE DOES NOT LIKE BEING CONFINED TO A DIALYSIS MACHINE. OFFERED FOR PATIENT TO CONSIDER NURSING HOME W/ ONSITE HD. PATIENT WISHES TO CONTINUE TO LIVE IN ASSISTED LIVING.    # ACUTE ON CHRONIC HYPOXIC RESPIRATORY FAILURE S/T PULMONARY EDEMA - S/T INCOMPLETE HD SESSIONS ; ANASARCA  # HYPERKALEMIA  # HX OF COPD + S/P RECENT MULTIFOCAL PNEUMONIA ; R/O ASPIRATION PNEUMONIA  # UNCONTROLLED HTN  # ESRD ON HD TTS - W/ HX OF NONCOMPLIANCE    - TELEMETRY  - HYDRALAZINE, METOPROLOL AND NORVASC  - LOKELMA  - SUPPLEMENTAL O2  - PLANNED FOR HD  - NEPHROLOGY CONSULT    - PLANNED FOR HD ,  [INCOMPLETE SESSIONS]  - REFUSED HD ON ,   - UNDERWENT HD  [COMPLETE SESSION]      - [] - OVERNIGHT RAPID RESPONSE S/T HYPOXIA - SELF-LIMITED - PATIENT IMPROVED S/P O2 SUPPLEMENT  - EMPIRICALLY STARTED ON CEFEPIME + FLAGYLL, F/U BCX       # RECURRENT INTRACTABLE NAUSEA/VOMITING, ? COFFEE GROUND EMESIS  # GASTROPARESIS  # HISTORY OF ESOPHAGITIS AND DUODENITIS [2021], HX OF GASTROPARESIS W/ EVIDENCE OF THICKENED ESOPHAGUS ON PREVIOUS CT SCAN   # PANCREAS W/ DOUBLE DUCT SIGN    - MONITORING HGB, PLACED ON PPI BID , CARAFATE QID  - PRN ANTIEMETICS  - MONITORING FOR SYMPTOMS  - WHILE ON DIET PATIENT REPEATEDLY COUNSELLED TO CHEW FOOD CAUTIOUSLY AND CONSUME SMALL BITES W/ REGULAR CLEARING WITH WATER BETWEEN BITES    - NPO  - NOTED CT A/P    - S/P RECENT PRBC TRANSFUSION     - GI CONSULT  - SURGERY CONSULT    # R/O CHOLECYSTITIS  - PLACED ON CEFEPIME, F/U BCX  - NOTED CT A/P  - HIDA SCAN - NEGATIVE  - SURGERY CONSULT    # ELEVATED TROPONINS - ? DEMAND ISCHEMIA    - MONITOR ON TELEMETRY  - TREND TROPONINS  - CARDIOLOGY CONSULT    # GASTROPARESIS  # UNDERLYING DM  - SSI + FS  - COUNSELLED TO COMPLY WITH HD TO REDUCE UREMIA    # DEPRESSION  - PLACED ON ZOLOFT  - PSYCHIATRY CONSULT    # PATIENT UNDERGOING OUTPATIENT WORKUP FOR CENTRAL VENOUS STENOSIS THROUGH NEPHROLOGY TEAM  - ? s/p STENT PLACEMENT    # ANEMIA OF CKD  # HX OF PANCYTOPENIA   - TREND HGB, TRANSFUSION THRESHOLD HGB < 7; PATIENT STILL INTERMITTENTLY REFUSING BLOODWORK, COUNSELLED TO COMPLY  - TYPE AND SCREEN  - ON EPO  - DENIES RECENT HEMATEMESIS, MELENA, HEMATOCHEZIA    - S/P RECENT PRBC TRANSFUSION  - S/P PRBC TRANSFUSION     - PPI BID, CARAFATE QID    # SEVERE PROTEIN CALORIE MALNUTRITION, FAILURE TO THRIVE   -  TEMPORAL WASTING, LOSS OF MUSCLE MASS FROM SHOULDER AND HIP GIRDLE  - NUTRITIONAL SUPPLEMENT    # HLD  # PARTIALLY BLIND  # S/P LEFT BKA  # HX OF PVD  # LS SPINAL STENOSIS  # GI AND DVT PPX       Patient is a 62y old  Male who presents with a chief complaint of Hypervolemia     (02 Aug 2023 09:50)    PATIENT IS SEEN AND EXAMINED IN MEDICAL FLOOR.    ALLERGIES:  No Known Drug Allergies  fish (Rash)  liver (Anaphylaxis)      Daily     Daily Weight in k.3 (01 Aug 2023 19:03)    VITALS:    Vital Signs Last 24 Hrs  T(C): 37.3 (02 Aug 2023 05:36), Max: 37.3 (02 Aug 2023 05:36)  T(F): 99.2 (02 Aug 2023 05:36), Max: 99.2 (02 Aug 2023 05:36)  HR: 97 (02 Aug 2023 05:36) (91 - 97)  BP: 160/67 (02 Aug 2023 05:36) (139/80 - 163/71)  BP(mean): --  RR: 18 (02 Aug 2023 05:36) (17 - 20)  SpO2: 97% (02 Aug 2023 02:17) (96% - 97%)    Parameters below as of 02 Aug 2023 05:36  Patient On (Oxygen Delivery Method): nasal cannula  O2 Flow (L/min): 2      LABS:    CBC Full  -  ( 01 Aug 2023 17:05 )  WBC Count : 5.34 K/uL  RBC Count : 2.73 M/uL  Hemoglobin : 7.3 g/dL  Hematocrit : 23.4 %  Platelet Count - Automated : 116 K/uL  Mean Cell Volume : 85.7 fl  Mean Cell Hemoglobin : 26.7 pg  Mean Cell Hemoglobin Concentration : 31.2 gm/dL  Auto Neutrophil # : x  Auto Lymphocyte # : x  Auto Monocyte # : x  Auto Eosinophil # : x  Auto Basophil # : x  Auto Neutrophil % : x  Auto Lymphocyte % : x  Auto Monocyte % : x  Auto Eosinophil % : x  Auto Basophil % : x      08-    139  |  101  |  101<H>  ----------------------------<  86  4.4   |  23  |  16.10<H>    Ca    8.3<L>      01 Aug 2023 17:05  Phos  7.1     08-  Mg     3.1     08-    TPro  6.6  /  Alb  2.9<L>  /  TBili  0.8  /  DBili  x   /  AST  45<H>  /  ALT  42  /  AlkPhos  73  08-    CAPILLARY BLOOD GLUCOSE      POCT Blood Glucose.: 75 mg/dL (02 Aug 2023 06:49)  POCT Blood Glucose.: 82 mg/dL (02 Aug 2023 02:13)  POCT Blood Glucose.: 77 mg/dL (01 Aug 2023 23:46)  POCT Blood Glucose.: 81 mg/dL (01 Aug 2023 21:43)  POCT Blood Glucose.: 79 mg/dL (01 Aug 2023 16:04)  POCT Blood Glucose.: 79 mg/dL (01 Aug 2023 12:05)        LIVER FUNCTIONS - ( 01 Aug 2023 17:05 )  Alb: 2.9 g/dL / Pro: 6.6 g/dL / ALK PHOS: 73 U/L / ALT: 42 U/L DA / AST: 45 U/L / GGT: x           Creatinine Trend: 16.10<--, 13.80<--, 11.30<--, 16.60<--, 15.60<--, 15.80<--  I&O's Summary          .Blood Blood-Peripheral   @ 14:07   No Growth Final  --  --          MEDICATIONS:    MEDICATIONS  (STANDING):  albuterol/ipratropium for Nebulization 3 milliLiter(s) Nebulizer every 6 hours  amLODIPine   Tablet 10 milliGRAM(s) Oral daily  aspirin enteric coated 81 milliGRAM(s) Oral daily  atorvastatin 40 milliGRAM(s) Oral at bedtime  budesonide 160 MICROgram(s)/formoterol 4.5 MICROgram(s) Inhaler 2 Puff(s) Inhalation two times a day  cefepime   IVPB 1000 milliGRAM(s) IV Intermittent every 24 hours  chlorhexidine 2% Cloths 1 Application(s) Topical daily  dextrose 5%. 1000 milliLiter(s) (50 mL/Hr) IV Continuous <Continuous>  dextrose 5%. 1000 milliLiter(s) (100 mL/Hr) IV Continuous <Continuous>  dextrose 50% Injectable 25 Gram(s) IV Push once  dextrose 50% Injectable 12.5 Gram(s) IV Push once  dextrose 50% Injectable 12.5 milliLiter(s) IV Push once  dextrose 50% Injectable 25 Gram(s) IV Push once  epoetin beverley (PROCRIT) Injectable [id number] Unit(s) IV Push <User Schedule>  folic acid 1 milliGRAM(s) Oral daily  furosemide    Tablet 80 milliGRAM(s) Oral daily  glucagon  Injectable 1 milliGRAM(s) IntraMuscular once  hydrALAZINE 25 milliGRAM(s) Oral three times a day  insulin glargine Injectable (LANTUS) 4 Unit(s) SubCutaneous at bedtime  latanoprost 0.005% Ophthalmic Solution 1 Drop(s) Both EYES at bedtime  levothyroxine 25 MICROGram(s) Oral daily  LORazepam     Tablet 0.5 milliGRAM(s) Oral <User Schedule>  metoprolol succinate ER 50 milliGRAM(s) Oral daily  metroNIDAZOLE  IVPB 500 milliGRAM(s) IV Intermittent every 8 hours  pantoprazole  Injectable 40 milliGRAM(s) IV Push every 12 hours  sertraline 50 milliGRAM(s) Oral daily  sucralfate 1 Gram(s) Oral four times a day      MEDICATIONS  (PRN):  dextrose Oral Gel 15 Gram(s) Oral once PRN Blood Glucose LESS THAN 70 milliGRAM(s)/deciliter  haloperidol    Injectable 0.5 milliGRAM(s) IntraMuscular every 8 hours PRN Agitation  ondansetron Injectable 4 milliGRAM(s) IV Push every 6 hours PRN Nausea and/or Vomiting      REVIEW OF SYSTEMS:                           ALL ROS DONE [ X   ]    CONSTITUTIONAL:  LETHARGIC [   ], FEVER [   ], UNRESPONSIVE [   ]  CVS:  CP  [   ], SOB, [   ], PALPITATIONS [   ], DIZZYNESS [   ]  RS: COUGH [   ], SPUTUM [   ]  GI: ABDOMINAL PAIN [   ], NAUSEA [   ], VOMITINGS [   ], DIARRHEA [   ], CONSTIPATION [   ]  :  DYSURIA [   ], NOCTURIA [   ], INCREASED FREQUENCY [   ], DRIBLING [   ],  SKELETAL: PAINFUL JOINTS [   ], SWOLLEN JOINTS [   ], NECK ACHE [   ], LOW BACK ACHE [   ],  SKIN : ULCERS [   ], RASH [   ], ITCHING [   ]  CNS: HEAD ACHE [   ], DOUBLE VISION [   ], BLURRED VISION [   ], AMS / CONFUSION [   ], SEIZURES [   ], WEAKNESS [   ],TINGLING / NUMBNESS [   ]    PHYSICAL EXAMINATION:  GENERAL APPEARANCE: NO DISTRESS,    ANASARCA ++  HEENT:  NO PALLOR, NO  JVD,  NO   NODES, NECK SUPPLE  CVS: S1 +, S2 +,   RS: AEEB,  OCCASIONAL  RALES +,   CRACKLES+ AT LUNG BASES  ABD: SOFT, NT, NO, BS +  EXT: LEFT BKA  SKIN: WARM,   SKELETAL:  ROM ACCEPTABLE  CNS:  AAO X  3      RADIOLOGY :      ASSESSMENT :     Hypervolemia    No pertinent past medical history    Hypertension    Adrenal insufficiency    CKD (chronic kidney disease)    Anemia    Glaucoma    Coronary artery disease    HLD (hyperlipidemia)    Peripheral vascular disease    Spinal stenosis of lumbosacral region    Hyperparathyroidism    Diabetes mellitus    Diabetic neuropathy    Contracture of hand    Osteoarthritis    Osteoporosis    Vision loss of left eye    ESRD on hemodialysis    Cataract    BPH (benign prostatic hyperplasia)    UTI (urinary tract infection)    Bladder mass    H/O hematuria    Osteoporosis    Vision loss of right eye    Depression    Chronic GERD    Osteomyelitis of vertebra    CHF (congestive heart failure)    No significant past surgical history    Below knee amputation status, left    History of right cataract extraction    History of left cataract extraction    S/P arteriovenous (AV) fistula creation    H/O hematuria    H/O transurethral destruction of bladder lesion    History of excision of mass        PLAN:  HPI:  Patient is 62 year old male from [facility], walks with RW, with PMH of ESRD on HD (TTS), BKA- L w/prosthetic leg, DM, Left eye blindness, CHF, CAD, HTN, HLD, osteoporosis, bronchitis, current smoker, and anemia who presents with complaint of missed dialysis. Last HD . Pt states he often missed HD sessions.  patient states he missed this HD session due to mild pain in left leg, patient remains able to ambulate. Pt complains of right leg swelling and states he does not make any urine. Patient states he was having mild shortness of breath.  Pt denies any fever, chills, N/V/D, constipation, CP, numbness or tingling (2023 19:20)    # CASE DISCUSSED AT LENGTH WITH PATIENT'S BROTHER ARTEMIO @ [phone]. DISCUSSED REGARDING CURRENT CLINICAL CONDITION, RECOMMENDED EVALUATIONS AND INTERVENTIONS. ALL QUESTIONS ANSWERED. DISCUSSED THAT PROGNOSIS IS POOR/GRIM GIVEN UNDERLYING MEDICAL CONDITIONS. ALSO DISCUSSED THAT DESPITE VERBALIZING UNDERSTANDING OF MEDICAL CONDITIONS AND RECOMMENDED INTERVENTIONS - PATIENT PERSISTENTLY REMAINS NONCOMPLIANT. TEAM HAS OFFERED PATIENT EMOTIONAL SUPPORT AND OFFERED MEDICATION FOR MOOD. [] TEAM UPDATED FAMILY []    # PATIENT W/ HISTORY OF ROUTINE NONCOMPLIANCE W/ LIFE-SUSTAINING TREATMENT [HD], EVALUATIONS AND INTERVENTIONS - COUNSELLED AT LENGTH TO REMAIN COMPLIANT. D/W PATIENT THAT PROGNOSIS IS GRIM/POOR. HE VERBALIZED UNDERSTANDING. REGARDING GOC - PATIENT WISHES FOR FULL CODE. PALLIATIVE CARE CONSULTED. PSYCHIATRY CONSULTED.  - PATIENT IS ORIENTED TO PERSON, PLACE AND CIRCUMSTANCE. HE EXPRESSED THAT HE DOES GROW IMPATIENT DURING DIALYSIS SESSIONS AND HAS BEEN LEAVING SESSIONS SOONER THAN PRESCRIBED. IN DISCUSSION THAT RISKS OF DEFERRING COMPLETE HD - INCLUDE BUT ARE NOT LIMITED TO WORSENING CLINICAL CONDITION, ELECTROLYTE ABNORMALITIES, ARRHYTHMIA AND DEATH. HE VERBALIZED UNDERSTANDING. HE REFUSES TO CONSIDER A NURSING HOME WITH ONSITE HD.   - D/W PATIENT THAT REPEATEDLY REFUSING INTERVENTIONS AND ASSESSMENTS IS NOT IN LINE WITH HIS WISHES TO IMPROVE. PATIENT VERBALIZED UNDERSTANDING AND AGREEMENT. IN DISCUSSION, REGARDING POSSIBLE ETIOLOGY - PSYCHIATRY WAS CONSULTED TO DISCUSS MOOD, PATIENT DOES EXPRESS THAT HE HAS SOME DEPRESSION GIVEN HIS MEDICAL CONDITIONS. BUT HE DENIES THAT THIS IS THE ETIOLOGY FOR NONCOMPLIANCE. HE EXPLAINS THAT HE DOES NOT LIKE BEING CONFINED TO A DIALYSIS MACHINE. OFFERED FOR PATIENT TO CONSIDER NURSING HOME W/ ONSITE HD. PATIENT WISHES TO CONTINUE TO LIVE IN ASSISTED LIVING.    - [] - PATIENT REFUSING HD, REMOVED IV LINE, REFUSING MEDICATION - COUNSELLED BY TEAM AND BY FAMILY - THAT RISKS OF NONCOMPLIANCE INCLUDE BUT ARE NOT LIMITED TO WORSENING CLINICAL CONDITION AND DEATH. PATIENT VERBALIZED UNDERSTANDING, HOWEVER SHORTLY THEREAFTER REMOVED IV LINE. FAMILY ALSO COUNSELLING PATIENT TO COMPLY. PROGNOSIS IS POOR. PATIENT AND FAMILY VERBALIZE UNDERSTANDING. PALLIATIVE CARE CONSULT IN PROGRESS    # ACUTE ON CHRONIC HYPOXIC RESPIRATORY FAILURE S/T PULMONARY EDEMA - S/T INCOMPLETE HD SESSIONS ; ANASARCA  # HYPERKALEMIA  # HX OF COPD + S/P RECENT MULTIFOCAL PNEUMONIA ; R/O ASPIRATION PNEUMONIA  # PULMONARY HYPERTENSION  # UNCONTROLLED HTN  # ESRD ON HD TTS - W/ HX OF NONCOMPLIANCE    - TELEMETRY  - HYDRALAZINE, METOPROLOL AND NORVASC ; PRN IV ANTIHYPERTENSIVE  - LOKELMA  - SUPPLEMENTAL O2  - PLANNED FOR HD  - NEPHROLOGY CONSULT  - PULMONOLOGY CONSULT  - ID CONSULT    - PLANNED FOR HD ,  [INCOMPLETE SESSIONS],    - REFUSED HD ON ,   - UNDERWENT HD  [COMPLETE SESSION]      - [] - OVERNIGHT RAPID RESPONSE S/T HYPOXIA - SELF-LIMITED - PATIENT IMPROVED S/P O2 SUPPLEMENT  - EMPIRICALLY STARTED ON CEFEPIME + FLAGYLL, F/U BCX       # RECURRENT INTRACTABLE NAUSEA/VOMITING, ? COFFEE GROUND EMESIS  # GASTROPARESIS  # HISTORY OF ESOPHAGITIS AND DUODENITIS [2021], HX OF GASTROPARESIS W/ EVIDENCE OF THICKENED ESOPHAGUS ON PREVIOUS CT SCAN   # PANCREAS W/ DOUBLE DUCT SIGN    - MONITORING HGB, PLACED ON PPI BID , CARAFATE QID  - PRN ANTIEMETICS   - MONITORING FOR SYMPTOMS  - WHILE ON DIET PATIENT REPEATEDLY COUNSELLED TO CHEW FOOD CAUTIOUSLY AND CONSUME SMALL BITES W/ REGULAR CLEARING WITH WATER BETWEEN BITES    - TRIAL OF CLEAR LIQUID DIET  - NOTED CT A/P    - S/P RECENT PRBC TRANSFUSION     - GI CONSULT  - SURGERY CONSULT    # R/O CHOLECYSTITIS  - PLACED ON CEFEPIME, F/U BCX  - NOTED CT A/P  - HIDA SCAN - NEGATIVE  - SURGERY CONSULT    # ELEVATED TROPONINS - ? DEMAND ISCHEMIA    - S/P TELEMETRY  - TREND TROPONINS  - ECHO - TRACE MR, MODERATELY INCREASED LV WALL THICKNESS, NORMAL LV SYSTOLIC FUNCTION, SEVERE PULMONARY HTN  - CARDIOLOGY CONSULT    # GASTROPARESIS  # UNDERLYING DM  - SSI + FS  - COUNSELLED TO COMPLY WITH HD TO REDUCE UREMIA    # DEPRESSION  - PLACED ON ZOLOFT  - PSYCHIATRY CONSULT    # PATIENT UNDERGOING OUTPATIENT WORKUP FOR CENTRAL VENOUS STENOSIS THROUGH NEPHROLOGY TEAM  - ? s/p STENT PLACEMENT    # ANEMIA OF CKD  # HX OF PANCYTOPENIA   - TREND HGB, TRANSFUSION THRESHOLD HGB < 7; PATIENT STILL INTERMITTENTLY REFUSING BLOODWORK, COUNSELLED TO COMPLY  - TYPE AND SCREEN  - ON EPO  - DENIES RECENT HEMATEMESIS, MELENA, HEMATOCHEZIA    - S/P RECENT PRBC TRANSFUSION  - S/P PRBC TRANSFUSION     - PPI BID, CARAFATE QID    # SEVERE PROTEIN CALORIE MALNUTRITION, FAILURE TO THRIVE   -  TEMPORAL WASTING, LOSS OF MUSCLE MASS FROM SHOULDER AND HIP GIRDLE  - NUTRITIONAL SUPPLEMENT    # HLD  # PARTIALLY BLIND  # S/P LEFT BKA  # HX OF PVD  # LS SPINAL STENOSIS  # GI AND DVT PPX       Statement Selected

## 2023-12-30 LAB
-  AMIKACIN: SIGNIFICANT CHANGE UP
-  AMIKACIN: SIGNIFICANT CHANGE UP
-  AMPICILLIN/SULBACTAM: SIGNIFICANT CHANGE UP
-  AMPICILLIN/SULBACTAM: SIGNIFICANT CHANGE UP
-  CEFEPIME: SIGNIFICANT CHANGE UP
-  CEFEPIME: SIGNIFICANT CHANGE UP
-  CEFTAZIDIME: SIGNIFICANT CHANGE UP
-  CEFTAZIDIME: SIGNIFICANT CHANGE UP
-  CIPROFLOXACIN: SIGNIFICANT CHANGE UP
-  CIPROFLOXACIN: SIGNIFICANT CHANGE UP
-  GENTAMICIN: SIGNIFICANT CHANGE UP
-  GENTAMICIN: SIGNIFICANT CHANGE UP
-  IMIPENEM: SIGNIFICANT CHANGE UP
-  IMIPENEM: SIGNIFICANT CHANGE UP
-  LEVOFLOXACIN: SIGNIFICANT CHANGE UP
-  LEVOFLOXACIN: SIGNIFICANT CHANGE UP
-  MEROPENEM: SIGNIFICANT CHANGE UP
-  MEROPENEM: SIGNIFICANT CHANGE UP
-  PIPERACILLIN/TAZOBACTAM: SIGNIFICANT CHANGE UP
-  PIPERACILLIN/TAZOBACTAM: SIGNIFICANT CHANGE UP
-  TOBRAMYCIN: SIGNIFICANT CHANGE UP
-  TOBRAMYCIN: SIGNIFICANT CHANGE UP
-  TRIMETHOPRIM/SULFAMETHOXAZOLE: SIGNIFICANT CHANGE UP
-  TRIMETHOPRIM/SULFAMETHOXAZOLE: SIGNIFICANT CHANGE UP
CULTURE RESULTS: ABNORMAL
METHOD TYPE: SIGNIFICANT CHANGE UP
ORGANISM # SPEC MICROSCOPIC CNT: ABNORMAL
SPECIMEN SOURCE: SIGNIFICANT CHANGE UP

## 2024-01-01 NOTE — ED ADULT TRIAGE NOTE - NS ED TRIAGE AVPU SCALE
Alert-The patient is alert, awake and responds to voice. The patient is oriented to time, place, and person. The triage nurse is able to obtain subjective information.
-Poor feeding (fewer than 5 feedings in 24 hours)

## 2024-01-11 NOTE — PROGRESS NOTE ADULT - ASSESSMENT
Problem: Adult Inpatient Plan of Care  Goal: Plan of Care Review  Outcome: Ongoing, Progressing  Goal: Patient-Specific Goal (Individualized)  Outcome: Ongoing, Progressing  Goal: Absence of Hospital-Acquired Illness or Injury  Outcome: Ongoing, Progressing  Goal: Optimal Comfort and Wellbeing  Outcome: Ongoing, Progressing  Goal: Readiness for Transition of Care  Outcome: Ongoing, Progressing     Problem: Fall Injury Risk  Goal: Absence of Fall and Fall-Related Injury  Outcome: Ongoing, Progressing     Problem: Skin Injury Risk Increased  Goal: Skin Health and Integrity  Outcome: Ongoing, Progressing     Problem: Impaired Wound Healing  Goal: Optimal Wound Healing  Outcome: Ongoing, Progressing     Problem: Infection  Goal: Absence of Infection Signs and Symptoms  Outcome: Ongoing, Progressing     Problem: Malnutrition  Goal: Improved Nutritional Intake  Outcome: Ongoing, Progressing      62 year old male from [facility], walks with RW, with PMH of ESRD on HD (TTS), BKA- L w/prosthetic leg, DM, Left eye blindness, CHF, CAD, HTN, HLD, osteoporosis, bronchitis, current smoker, and anemia who presents with complaint of missed dialysis and does not make any urine. Patient states he was having shortness of breath. Patient admitted to telemetry for Acute Hyperkalemia 2/2 missed dialysis found to have pulmonary edema and BNP [id number]. Cardiology and nephro following.     Additionally pt had coffee ground emesis and abdominal pain, repeat CT A/P was negative for small bowel obstruction. But noted for cholelithiasis and trace pericholecystic fluid. Surgery following, concern for possible acute cholecystitis, HIDA scan was negative for acute cholecystitis. Additionally GI was consulted for possible endoscopy due to recent anemia and coffee ground emesis, no additional intervention at this time.     Hospital course complicated by Acute Hypoxic Respiratory Failure 2/2 worsening CHF. Patient was a rapid response due to hypoxia of 70-80% on room air, now requiring 4L oxygen via nasal cannula. Repeat CXR showing worsening pulmonary edema. Pt also found to have small bilateral pleural effusions, bilateral atelectasis and possible aspiration pneumonia, patient was started on cefepime and flagyl, ID Dr. Newby was consulted.    Patient for dialysis today 8/1, continues to have intermittent nausea/vomiting. Brother, Georgi King updated and aware of plan of care.     Patient is refusing blood work and meds again, HIDA negative for cholecystitis, cardiology consult noted, CHF likely 2/2 fluid overload after missing dialysis op.    Palliative consulted, patient refused blood work again this AM, will re consult  dr Linder as patient is unstable about decision making regarding his treatment. Patient brother is updated daily about patient condition and refusals.  Patient is scheduled for dialysis today, will obtain CBC and BNP. Last night US guided IV was inserted by on chanda l ICU team, but patient removed IV at 9PM 2 hours after.    08/04- pt ia refusing blood work again this morning. Dialysis yesterday was cancelled as patient refused. Becoming more lethargic today. Zoloft increased to 200mg qd as per  recommendation.   Patient brother updated on patient non compliance. Jacoby rodriguez is scheduled for today to discuss patient treatment options.

## 2024-01-26 NOTE — DIETITIAN INITIAL EVALUATION ADULT - NS FNS DIET ORDER
Diet, Low Fiber:   DASH/TLC {Sodium & Cholesterol Restricted} (12-03-23 @ 11:50)   [Father] : father [Formula ___ oz/feed] : [unfilled] oz of formula per feed [Hours between feeds ___] : Child is fed every [unfilled] hours [Normal] : Normal [In Bassinet/Crib] : sleeps in bassinet/crib [On back] : sleeps on back [Co-sleeping] : no co-sleeping [No] : No cigarette smoke exposure [Water heater temperature set at <120 degrees F] : Water heater temperature set at <120 degrees F [Rear facing car seat in back seat] : Rear facing car seat in back seat [Carbon Monoxide Detectors] : Carbon monoxide detectors at home [Smoke Detectors] : Smoke detectors at home. [Gun in Home] : No gun in home [At risk for exposure to TB] : Not at risk for exposure to Tuberculosis  [de-identified] : foster dad  [FreeTextEntry7] : well [FreeTextEntry1] : 2 month old well visit

## 2024-01-29 NOTE — BRIEF OPERATIVE NOTE - DISPOSITION
Quality 137: Melanoma: Continuity Of Care - Recall System: Patient information entered into a recall system that includes: target date for the next exam specified AND a process to follow up with patients regarding missed or unscheduled appointments Detail Level: Simple Detail Level: Zone Hide Additional Notes?: No PACU & Home Detail Level: Detailed When Should The Patient Follow-Up For Their Next Full-Body Skin Exam?: 1 Year

## 2024-02-06 NOTE — BH CONSULTATION LIAISON ASSESSMENT NOTE - HOMICIDALITY / AGGRESSION (CURRENT/PAST)
pt here for eval of diarrhea/abd pain. . Previously seen by Dr. Jannet Ko . Today on 2/6/2024, pt reports that she had eye surgery done and took 2 sets of ABX and then had severe diarrhea.  Was not tested for c diff (sxs started Fall 2023), but now resolved.  Now having regular BM, no abd pain.  No probiotics.  Was taking imodium at the time. . 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

## 2024-02-12 ENCOUNTER — APPOINTMENT (OUTPATIENT)
Dept: VASCULAR SURGERY | Facility: CLINIC | Age: 64
End: 2024-02-12

## 2024-02-13 NOTE — PATIENT PROFILE ADULT - NSPROPASSIVESMOKEEXPOSURE_GEN_A_NUR
Patient needs PA on Ozempic   Pt states all pharmacies don't have ozempic. Can pt be prescribed something else. Pls call pt back.    SUBMITTED PA FOR Ozempic (1 MG/DOSE) 4MG/3ML pen-injectors VIA CMM Key: [id number] STATUS PENDING.      FOLLOW UP DONE DAILY: IF NO RESPONSE IN 3 DAYS WE WILL REFAX FOR STATUS CHECK. IF ANOTHER 3 DAYS GOES BY WITH NO RESPONSE WILL CALL INSURANCE FOR STATUS.    The medication is APPROVED. LETTER AVAILABLE IN MEDIA    If this requires a response please respond to the pool ( P MHCX PSC MEDICATION PRE-AUTH).      Thank you please advise patient.    none Unknown

## 2024-03-11 NOTE — DIETITIAN INITIAL EVALUATION ADULT. - PERTINENT MEDS FT
PCP: Dr. Ruiz Valles  Cardio: Dr Weaver   Neuro: Denies having one  Nephro: Dr Johnson
MEDICATIONS  (STANDING):  amLODIPine   Tablet 10 milliGRAM(s) Oral daily  chlorhexidine 2% Cloths 1 Application(s) Topical daily  diphenhydrAMINE Injectable 25 milliGRAM(s) IV Push every 12 hours  epoetin beverley-epbx (RETACRIT) Injectable 4000 Unit(s) IV Push <User Schedule>  insulin glargine Injectable (LANTUS) 4 Unit(s) SubCutaneous at bedtime  insulin lispro (ADMELOG) corrective regimen sliding scale   SubCutaneous three times a day before meals  insulin lispro (ADMELOG) corrective regimen sliding scale   SubCutaneous at bedtime  metoclopramide Injectable 5 milliGRAM(s) IV Push every 8 hours  nortriptyline 10 milliGRAM(s) Oral at bedtime  pantoprazole  Injectable 40 milliGRAM(s) IV Push two times a day  sevelamer carbonate 2400 milliGRAM(s) Oral three times a day with meals  simvastatin 40 milliGRAM(s) Oral at bedtime  sucralfate suspension 1 Gram(s) Oral four times a day

## 2024-03-28 NOTE — BH CONSULTATION LIAISON ASSESSMENT NOTE - DESCRIPTION
Cardiology Consult    Patient:  Cuco Mcnamara [birth year] [id number]  Consulting Physician:  Paulo Eduardo MD    Patient is a 40 year old male seen in consultation for cardiac murmur, consult request from Nathanael Hair MD.    No chest pain or shortness of breath with day-to-day activities no decrease in activity tolerance compared to 1 year  Occasional palpitations lasting a few seconds usually in the morning when he gets up  There is no history of orthopnea, paroxysmal nocturnal dyspnea, palpitations, lightheadedness, syncope, edema, claudication, fall.    History positive for snoring, apnea, daytime hypersomnia    No family history of premature CAD or sudden cardiac death    No smoking for 2-3 years  Occasional alcohol use    REVIEW OF SYSTEMS:  CONSTITUTIONAL:  No fever, chills or weight loss.  EYES:  No double vision, recent changes in vision.  HENT:  No headaches, tinnitus, throat irritation.  GASTROINTESTINAL:  No bleeds, nausea, vomiting or diarrhea, abdominal pain.  GENITOURINARY:  No hematuria.  RESPIRATORY:  No cough, wheezing or sputum production.  NEUROLOGICAL:  No symptoms compatible with TIA/CVA.  SKIN:  No rashes.  MUSCULOSKELETAL:  No muscle aches or tenderness.  ENDOCRINE:  Denies polyuria or polydipsia.  LYMPHATIC:  Denies swollen glands.  PSYCHIATRIC:  Denies depression or anxiety.      PAST HISTORY:  Attention deficit disorder   Depression    Review of patient's family status indicates:    Mother                         Alive                     Father                         Alive                     Sister                         Alive                     Paternal Uncle                 Alive                     Maternal Grandmother                             Maternal Grandfather                             Paternal Grandmother           Alive                     Paternal Grandfather                             Paternal Aunt                                               Social History     Tobacco Use   Smoking Status Former    Current packs/day: 0.00    Average packs/day: 0.5 packs/day for 10.0 years (5.0 ttl pk-yrs)    Types: Cigarettes    Start date:     Quit date:     Years since quittin.2   Smokeless Tobacco Never   Tobacco Comments    started teenager       Social History     Substance and Sexual Activity   Alcohol Use Yes    Alcohol/week: 2.5 standard drinks of alcohol    Types: 3 Standard drinks or equivalent per week    Comment: past hx-self medicated depression       Current Outpatient Medications   Medication Sig Dispense Refill    amphetamine-dextroamphetamine (ADDERALL) 30 MG tablet TAKE ONE TABLET BY MOUTH EVERY MORNING AND ONE-HALF TABLET DAILY AT NOON 42 tablet 0    fluoxetine (PROzac) 40 MG capsule Take 1 capsule by mouth daily. 90 capsule 3    azelastine (ASTELIN) 0.1 % nasal spray SPRAY ONE SPRAY IN EACH NOSTRIL TWICE DAILY AS DIRECTED 30 mL 6    fluticasone (FLONASE) 50 MCG/ACT nasal spray SPRAY TWO SPRAYS IN EACH NOSTRIL DAILY 16 g 6    EPINEPHrine (AUVI-Q) 0.3 MG/0.3ML auto-injector Inject 0.3 mLs into the muscle 1 time as needed for Anaphylaxis. 4 each 2     No current facility-administered medications for this visit.       Allergies as of 2024 - Reviewed 2024   Allergen Reaction Noted    Bee sting SWELLING 2021       No past surgical history on file.      PHYSICAL EXAMINATION:  Visit Vitals  /68   Pulse 88   Ht 5' 7\" (1.702 m)   Wt 80.8 kg (178 lb 3.9 oz)   BMI 27.92 kg/m²     GENERAL:  Well-developed, well-nourished, no acute distress.  EYE EXAM:  No pallor, no jaundice, no xanthelasma.  ORAL CAVITY:  No cyanosis.  NECK:  No JVD, bruits or thyromegaly.  CHEST WALL:  No tenderness, no deformity.  LUNGS:  Clear to auscultation.  Respiratory effort is normal.  No respiratory distress.  CARDIOVASCULAR:  S1, S2, normal.  No , gallops or rubs.  No precordial lifts.  Grade 3 holosystolic murmur apex/left axilla  ABDOMEN:   Non tender.  Bowel sounds are present.  No bruits.  No hepatosplenomegaly.  EXTREMITIES:  No edema, cyanosis or clubbing.  MUSCULOSKELETAL:  Gait is normal.  SKIN:  No rashes noted.  NEUROLOGIC EXAM:  Patient is oriented x3.  Mood and affect are normal.  PSYCHIATRIC:  Speech and behavior appropriate.      LABS:  No results found for: \"WBC\"  No results found for: \"HCT\"  No results found for: \"HGB\"  No results found for: \"PLT\"  Potassium (no units)   Date Value   04/22/2016 4.2     Creatinine (no units)   Date Value   04/22/2016 0.94     GFR Estimate, Non  (no units)   Date Value   04/22/2016 >90     Cholesterol (mg/dL)   Date Value   04/26/2023 229 (H)     HDL (mg/dL)   Date Value   04/26/2023 38 (L)     Triglycerides (mg/dL)   Date Value   04/26/2023 222 (H)     LDL (mg/dL)   Date Value   04/26/2023 147 (H)     Echo 11/2023  Normal left ventricular size and systolic function, EF 62 %, GLS -22.0 %.    * Normal right ventricular size and systolic function.    * Moderately increased left atrial chamber size.    * Normal right atrial chamber size.    * mitral valve P2 prolapse, flail chordae, moderate regurgitation ( mid and  late systolic), mitral annulus disjunction.    * Mild tricuspid valve regurgitation.    * Right ventricular systolic pressure; 25 mmHg.    * No pericardial effusion.      EKG:  Sinus rhythm, inferior T inversions, nonspecific T-wave changes    CXR:  -    IMPRESSION/PLAN:    40-year-old, asymptomatic, systolic murmur    Mitral valve P2 prolapse/flail chordae/moderate regurgitation/mitral annulus disjunction  EF 62%, ESD 2.8 cm  Exam with no evidence of heart failure  Will obtain a transesophageal echo to clarify flail segment/chordae  TIFFANIE procedure and risks involved discussed, patient willing to proceed      Possible sleep apnea, sleep study requested    Abnormal EKG, no symptoms  Continue with lifestyle changes      Orders Placed This Encounter    Electrocardiogram 12-Lead       No  follow-ups on file.    Paulo Eduardo MD     Unknown

## 2024-04-10 NOTE — H&P ADULT - PROBLEM/PLAN-6
HUB TO RELAY    Attempt 1  Left detail voicemail informing patient that she has some over due labs that were scheduled at her last office visit and to call our office to schedule lab appt along with her annual   
DISPLAY PLAN FREE TEXT

## 2024-04-24 NOTE — ED PROVIDER NOTE - CHPI ED SYMPTOMS NEG
Pre-Endoscopy History and Physical     Bang Donis MRN# [id number]   YOB: 1957 Age: 67 year old     Date of Procedure: 04/24/24  Primary care provider: [facility]  Type of Endoscopy: Colonoscopy  Reason for Procedure: Screening, high risk (family history in CRC over age 60)  Type of Anesthesia Anticipated: Moderate Sedation    HPI:    Bang is a 67 year old male who will be undergoing the above procedure.      Prior colonoscopy: 2012, complete, normal (F150, V3)    A history and physical has been performed, notable for no changes in bowel habits. The patient's medications and allergies have been reviewed. The risks and benefits of the procedure and the sedation options and risks were discussed with the patient.  All questions were answered and informed consent was obtained.      He has history of CRC in his father in his 80s, CRC in sister over age 60, and UC in son.     No Known Allergies   Allergy to Latex: NO   Allergy to tape: NO   Intolerances: NO     Current Outpatient Medications   Medication Sig Dispense Refill    tamsulosin (FLOMAX) 0.4 MG capsule Take 1 capsule (0.4 mg) by mouth daily       No current facility-administered medications for this encounter.       Patient Active Problem List   Diagnosis    CARDIOVASCULAR SCREENING; LDL GOAL LESS THAN 160    Obesity (BMI 30-39.9)    First degree AV block    Right groin hernia    Urinary frequency    Nephrolithiasis    Family history of colon cancer    BPH with obstruction/lower urinary tract symptoms        Past Medical History:   Diagnosis Date    Family history of colon cancer 02/21/2024    Nephrolithiasis 02/10/2022        Past Surgical History:   Procedure Laterality Date    COLONOSCOPY  7/9/2012    Procedure: COLONOSCOPY;  Colonoscopy ;  Surgeon: Homar Leger MD;  Location: [facility] OR    LAPAROSCOPIC HERNIORRHAPHY INGUINAL BILATERAL Bilateral 6/18/2018    Procedure: LAPAROSCOPIC HERNIORRHAPHY INGUINAL BILATERAL;   "Laparoscopic right inguinal hernia repair with mesh;  Surgeon: Jenna Akins MD;  Location: [facility] OR       Social History     Tobacco Use    Smoking status: Never    Smokeless tobacco: Never   Substance Use Topics    Alcohol use: Yes     Comment: very little use       Family History   Problem Relation Age of Onset    C.A.D. Father     Colon Cancer Father         in 80s    Colon Cancer Sister 63    Colon Cancer Sister     Colon Polyps Sister     Asthma Brother     Ulcerative Colitis Son     Diabetes No family hx of     Hypertension No family hx of     Cerebrovascular Disease No family hx of     Breast Cancer No family hx of           Prostate Cancer No family hx of        REVIEW OF SYSTEMS:     5 point ROS negative except as noted above in HPI, including Gen., Resp., CV, GI &  system review.      PHYSICAL EXAM:   BP (!) 154/94   Pulse 61   Temp 97.3  F (36.3  C) (Temporal)   Resp 16   SpO2 97%  Estimated body mass index is 29.79 kg/m  as calculated from the following:    Height as of 2/21/24: 1.854 m (6' 1\").    Weight as of 2/21/24: 102.4 kg (225 lb 12.8 oz).   All Vitals have been reviewed.    GENERAL APPEARANCE: healthy and alert  MENTAL STATUS: alert  AIRWAY EXAM: Mallampatti Class I (visualization of the soft palate, fauces, uvula, anterior and posterior pillars)  RESP: lungs clear to auscultation - no rales, rhonchi or wheezes  CV: regular rates and rhythm      IMPRESSION   ASA Class 1 - Healthy patient, no medical problems        PLAN:     Plan for colonoscopy. We discussed the risks, benefits and alternatives and the patient wished to proceed.    The above has been forwarded to the consulting provider.      Laura Delgadillo MD  [facility]  Phone: [phone]  Fax: [phone]  April 24, 2024    " No leg swelling/no chest pain/no cough/no fever

## 2024-05-16 NOTE — ED ADULT NURSE NOTE - GASTROINTESTINAL ASSESSMENT
Discharge Planning Assessment  [facility]     Patient Name: Wendie Houser  MRN: [id number]  Today's Date: 5/16/2024    Admit Date: 5/15/2024    Plan: back to [facility]. family to transport   Discharge Needs Assessment       Row Name 05/16/24 1631       Living Environment    People in Home alone;facility resident    Name(s) of People in Home [facility]    Current Living Arrangements assisted living facility    Primary Care Provided by self    Quality of Family Relationships helpful;involved;supportive    Able to Return to Prior Arrangements yes    Living Arrangement Comments lives at [facility]       Resource/Environmental Concerns    Resource/Environmental Concerns none    Transportation Concerns none       Transition Planning    Patient/Family Anticipates Transition to home;other (see comments)  lives at [facility]    Patient/Family Anticipated Services at Transition other (see comments)  lives at [facility]    Transportation Anticipated family or friend will provide       Discharge Needs Assessment    Equipment Currently Used at Home wheelchair, motorized;rollator;walker, rolling;grab bar    Concerns to be Addressed no discharge needs identified    Equipment Needed After Discharge none    Discharge Facility/Level of Care Needs assisted living facility  lives at [facility]                   Discharge Plan       Row Name 05/16/24 1634       Plan    Plan back to [facility]. family to transport    Roadmap to Recovery Yes    Plan Comments Spoke with the patient at bedside and she states the discharge planis back to [facility] and family to transport. Gave the patient the [employer] application and a list of transportation places with numbers and cost. Also gave her the information to call [employer] to see if she  qualifies for any benefits. She states that her son/Demetris Bar is the POA and she has another son/Camilo Bar also. She states she does not have their number but her daughter should. Placed a call to the patient's daughter and left a secure voice message for her to call CCP back. ROMAIN merino RN CCP.                  Continued Care and Services - Admitted Since 5/15/2024    No active coordination exists for this encounter.       Expected Discharge Date and Time       Expected Discharge Date Expected Discharge Time    May 19, 2024            Demographic Summary       Row Name 24 1631       General Information    Admission Type inpatient    Arrived From emergency department    Referral Source admission list    Reason for Consult discharge planning    Preferred Language English       Contact Information    Permission Granted to Share Info With     Contact Information Obtained for                    Functional Status       Row Name 24 1634       Functional Status    Usual Activity Tolerance moderate    Current Activity Tolerance moderate       Functional Status, IADL    Medications independent    Meal Preparation independent    Housekeeping independent    Laundry independent    Shopping independent       Mental Status    General Appearance WDL WDL       Mental Status Summary    Recent Changes in Mental Status/Cognitive Functioning no changes       Employment/    Employment Status retired    Employment/ Comments both spouses have  and both was in the                    Psychosocial    No documentation.                  Abuse/Neglect    No documentation.                  Legal    No documentation.                  Substance Abuse    No documentation.                  Patient Forms    No documentation.                     Estefany Merino RN     - - -

## 2024-05-20 NOTE — PROGRESS NOTE ADULT - PROBLEM SELECTOR PLAN 1
Problem: Safety - Adult  Goal: Free from fall injury  5/20/2024 1201 by Joel Payton, RN  Flowsheets (Taken 5/20/2024 1201)  Free From Fall Injury: Instruct family/caregiver on patient safety  Note: Patient admitted s/p fall, Rhabdo patient hx dementia. Alert to self only. Patient up with walker two assist. Patient with bed alarm, fall risk ,hourly rounding, side rails x2  bed to lowest position, no skid slippers  5/20/2024 0551 by Lelo Taylor, RN  Outcome: Progressing      12/3 Pt had 9 episodes of diarrhea within the last 24 hours. non bloody BM. Afebrile, no n/v, abdominal pain.  Diet switched to low fiber DASH  IVF for fluid loss 2/2 diarrhea  12/3 Started ciprofloxacin and flagyl 500mg TID  - cipro 250mg dosed once due to pt. cr cl. < 29. To be dosed post HD session. Next HD session 12/4.  C. diff, gi pcr negative  - c/w Ciprofloxacin and Flagyl

## 2024-05-24 NOTE — PATIENT PROFILE ADULT - FUNCTIONAL ASSESSMENT - DAILY ACTIVITY SCORE.
6 (moderate pain)
12
Chonodrocutaneous Helical Advancement Flap Text: The defect edges were debeveled with a #15 scalpel blade.  Given the location of the defect and the proximity to free margins a chondrocutaneous helical advancement flap was deemed most appropriate.  Using a sterile surgical marker, the appropriate advancement flap was drawn incorporating the defect and placing the expected incisions within the relaxed skin tension lines where possible.    The area thus outlined was incised deep to adipose tissue with a #15 scalpel blade.  The skin margins were undermined to an appropriate distance in all directions utilizing iris scissors.

## 2024-06-02 NOTE — ED ADULT NURSE NOTE - NS ED PATIENT SAFETY CONCERN
Progress Note    Patient Sudha Lopez  MRN: [id number]  YOB: 1944 Age: 79 y.o. Sex: female  Room: [address] [place] [address]       Admitting Physician: Ehsan Shabbir, MD   Date of Admission: 5/30/2024 10:33 PM   Primary Care Physician: Perla Leyva MD     Subjective:  Sudha Lopez was seen and examined. We are following for colonic distention.  -- Overnight events noted  Underwent emergent subtotal colectomy  Continues to be on pressors    ROS:  Constitutional: Denies fever, no change in appetite  Respiratory: Denies cough or shortness of breath  Cardiovascular: Denies chest pain or edema    Objective:  Vital Signs:   Vitals:    06/02/24 0954   BP:    Pulse: 68   Resp: 22   Temp:    SpO2: 100%         Physical Exam:  Constitutional: Intubated and sedated .   HEENT: Sclera anicteric, mucosal membranes moist  Cardiovascular: Regular rate and rhythm.  No murmurs.  Respiratory: Respirations nonlabored, no crepitus  GI: Abdomen nondistended, soft, and nontender.  No masses palpable.   Rectal: Deferred  Musculoskeletal:  No pitting edema of the lower legs.  Neurological: Intubated and sedated     Intake/Output:    Intake/Output Summary (Last 24 hours) at 6/2/2024 1105  Last data filed at 6/2/2024 0903  Gross per 24 hour   Intake 08441.46 ml   Output 1275 ml   Net 37675.46 ml        Current Medications:  Current Facility-Administered Medications   Medication Dose Route Frequency Provider Last Rate Last Admin    0.9 % sodium chloride infusion   IntraVENous PRN Bertin Jackson MD        fentaNYL (SUBLIMAZE) 1,000 mcg in sodium chloride 0.9% 100 mL infusion   mcg/hr IntraVENous Titrated Bertin Jackson MD 5 mL/hr at 06/02/24 1058 50 mcg/hr at 06/02/24 1058    And    fentaNYL (SUBLIMAZE) bolus from bag  50 mcg IntraVENous Q30 Min PRN Bertin Jackson MD        propofol infusion  5-50 mcg/kg/min IntraVENous Titrated Bertin Jackson MD 10.1 mL/hr at 06/02/24 1059 20 mcg/kg/min at 06/02/24 1059  No

## 2024-06-27 NOTE — H&P PST ADULT - ADMIT DATE
Chief complaint/Reason for consult: Thyroid nodules    HPI: Patient is a 58year old female here for evaluation of a thyroid nodule.  She was previously seen by different provider in this office with last visit being 6/22/2020 and reports since that time developing more throat tightness and congestion.  Also diagnosed with breast cancer and CT neck showed 11mm right-sided thyroid nodule so she came for follow-up today.    # Thyroid nodule:   - First noted on physical exam in 2020  - Last thyroid ultrasound: 6/22/2020 showing an asymmetric and mildly enlarged thyroid gland with subcentimeter cysts in the left lobe and subcentimeter nodules in the right along with a 1.07 cm spongiform nodule at the junction of the right lobe and isthmus  - Prior FNA's: None  - Reports some dysphagia and neck tightness, denies change in voice or shortness of breath  - Denies history of radiation to the head/neck  - No known family history of thyroid cancer  - No tobacco use    - TSH 0.995 done 2/8/2024    Review of Systems   Constitutional:  Negative for activity change.   HENT:  Positive for trouble swallowing. Negative for voice change.    Respiratory:  Positive for choking. Negative for shortness of breath.    Cardiovascular:  Negative for chest pain.   Musculoskeletal:  Negative for gait problem.   Skin:  Negative for rash.   Psychiatric/Behavioral:  Negative for agitation and confusion.         Physical Exam  Vitals reviewed.   Constitutional:       General: She is not in acute distress.  HENT:      Head: Normocephalic.      Nose: Nose normal.   Eyes:      Conjunctiva/sclera: Conjunctivae normal.   Cardiovascular:      Rate and Rhythm: Normal rate.      Pulses: Normal pulses.   Pulmonary:      Effort: Pulmonary effort is normal.   Abdominal:      Tenderness: There is no guarding.   Musculoskeletal:         General: No deformity.      Cervical back: Neck supple.   Skin:     General: Skin is warm and dry.   Neurological:      Mental  Status: She is alert and oriented to person, place, and time.   Psychiatric:         Mood and Affect: Mood normal.         Behavior: Behavior normal.          Neck Ultrasound Report    Indication: Thyroid nodule    Comparison Imaging: yes     Real time high resolution imaging of the thyroid gland was performed in transverse and longitudinal planes.        The right thyroid lobe measured 4.64 cm length x 1.89 cm AP x 2.31 cm in TV dimension. The isthmus measured 2.7 mm in thickness. The left thyroid lobe measured 4.37 cm length x 1.34 cm AP x 1.77 cm in TV dimension.     Nodule #1:   1.right mid thyroid lobe; L 1.05cm x TV 0.92cm x AP 0.67cm   2.spongiform  3.no calcification seen, punctate echogenic foci most consistent with colloid  4.well-defined margins with hypoechoic halo, posterior acoustic enhancement  5.grade 2 vascularity    Nodule #2:   1.Right lower lobe; L 1.92cm x TV 1.45cm x AP 1.23cm  2.spongiform with asymmetric cyst portion on the lateral edge  3.no calcification seen  4.well-defined margins without halo, history acoustic enhancement present  5.grade 2 vascularity    Nodule #3:   1.right thyroid lobe and isthmus junction; L 0.87cm x TV 0.56cm x AP 0.51cm   2.hypoechoic  3.no calcification seen  4.well-defined margins without halo  5.grade 1 vascularity    Multiple other subcentimeter spongiform nodules and cysts on left and right lobe    No pathologic lymph nodes were seen.     Impression: Nodular goiter with largest nodules as detailed above    Images saved to PACS.     Assessment and plan:     Diagnoses and all orders for this visit:    1. Multinodular goiter (nontoxic) (Primary)  Assessment & Plan:  -Right lower lobe nodule has grown in size from prior ultrasound in 2020 although it is mostly due to the cystic portion and overall has fairly benign features; offered FNA to patient today versus close monitoring with serial ultrasound and she prefers to defer FNA and follow-up with ultrasound  -At  this time I do not think her throat tightness and trouble swallowing is due to her thyroid nodules and recommend additional workup from ENT  -Recommend repeating thyroid ultrasound in 6 months or sooner with changes in compressive symptoms or change in physical exam    Orders:  -     US Thyroid; Future         Return in about 6 months (around 12/28/2024) for Thyroid nodule.     Electronically signed by Kyle S Rosenstein, MD      01-Aug-2022

## 2024-08-22 NOTE — ED ADULT NURSE NOTE - SUICIDE SCREENING QUESTION 1
Problem: Discharge Planning  Goal: Discharge to home or other facility with appropriate resources  2/16/2023 1022 by Sita Burroughs RN  Outcome: Progressing  2/16/2023 0415 by Phong Blanc RN  Outcome: Progressing  Flowsheets (Taken 2/16/2023 9762)  Discharge to home or other facility with appropriate resources:   Identify barriers to discharge with patient and caregiver   Arrange for needed discharge resources and transportation as appropriate   Identify discharge learning needs (meds, wound care, etc)   Refer to discharge planning if patient needs post-hospital services based on physician order or complex needs related to functional status, cognitive ability or social support system     Problem: Pain  Goal: Verbalizes/displays adequate comfort level or baseline comfort level  2/16/2023 1022 by Sita Burroughs RN  Outcome: Progressing  2/16/2023 0415 by Phong Blanc RN  Outcome: Progressing     Problem: Skin/Tissue Integrity  Goal: Absence of new skin breakdown  Description: 1. Monitor for areas of redness and/or skin breakdown  2. Assess vascular access sites hourly  3. Every 4-6 hours minimum:  Change oxygen saturation probe site  4. Every 4-6 hours:  If on nasal continuous positive airway pressure, respiratory therapy assess nares and determine need for appliance change or resting period.   2/16/2023 1022 by Sita Burroughs RN  Outcome: Progressing  2/16/2023 0415 by Phong Blanc RN  Outcome: Progressing     Problem: Safety - Adult  Goal: Free from fall injury  2/16/2023 1022 by Sita Burroughs RN  Outcome: Progressing  2/16/2023 0415 by Phong Blanc RN  Outcome: Progressing     Problem: ABCDS Injury Assessment  Goal: Absence of physical injury  2/16/2023 1022 by Sita Burroughs RN  Outcome: Progressing  2/16/2023 0415 by Phong Blanc RN  Outcome: Progressing     Problem: Neurosensory - Adult  Goal: Achieves stable or improved neurological status  Outcome: Pt calm, withdrawn to room but visible for needs. Denies psych symptoms but appears depressed. Denies any unmet needs at this time.   Progressing  Flowsheets (Taken 2/16/2023 0328 by Anthony Goff RN)  Achieves stable or improved neurological status: Assess for and report changes in neurological status     Problem: Respiratory - Adult  Goal: Achieves optimal ventilation and oxygenation  Outcome: Progressing  Flowsheets (Taken 2/16/2023 0328 by Anthony Goff RN)  Achieves optimal ventilation and oxygenation:   Assess for changes in respiratory status   Assess for changes in mentation and behavior   Position to facilitate oxygenation and minimize respiratory effort   Initiate smoking cessation protocol as indicated   Encourage broncho-pulmonary hygiene including cough, deep breathe, incentive spirometry   Assess the need for suctioning and aspirate as needed   Assess and instruct to report shortness of breath or any respiratory difficulty   Respiratory therapy support as indicated     Problem: Musculoskeletal - Adult  Goal: Return mobility to safest level of function  Outcome: Progressing  Flowsheets (Taken 2/16/2023 0328 by Anthony Goff RN)  Return Mobility to Safest Level of Function:   Assess patient stability and activity tolerance for standing, transferring and ambulating with or without assistive devices   Assist with transfers and ambulation using safe patient handling equipment as needed   Ensure adequate protection for wounds/incisions during mobilization   Obtain physical therapy/occupational therapy consults as needed   Apply continuous passive motion per provider or physical therapy orders to increase flexion toward goal   Instruct patient/family in ordered activity level  Goal: Return ADL status to a safe level of function  Outcome: Progressing  Flowsheets (Taken 2/16/2023 0328 by Anthony Goff RN)  Return ADL Status to a Safe Level of Function:   Administer medication as ordered   Assess activities of daily living deficits and provide assistive devices as needed   Obtain physical therapy/occupational therapy consults as needed   Assist and instruct patient to increase activity and self care as tolerated     Problem: Gastrointestinal - Adult  Goal: Minimal or absence of nausea and vomiting  Outcome: Progressing  Flowsheets (Taken 2/16/2023 0328 by Evelyne De La Torre RN)  Minimal or absence of nausea and vomiting:   Administer IV fluids as ordered to ensure adequate hydration   Maintain NPO status until nausea and vomiting are resolved   Administer ordered antiemetic medications as needed   Provide nonpharmacologic comfort measures as appropriate   Advance diet as tolerated, if ordered   Nutrition consult to assist patient with adequate nutrition and appropriate food choices  Goal: Maintains adequate nutritional intake  Outcome: Progressing  Flowsheets (Taken 2/16/2023 0328 by Evelyne De La Torre, RN)  Maintains adequate nutritional intake:   Monitor percentage of each meal consumed   Identify factors contributing to decreased intake, treat as appropriate   Monitor intake and output, weight and lab values No

## 2024-08-23 NOTE — BH CONSULTATION LIAISON ASSESSMENT NOTE - NSBHCHARTREVIEWLAB_PSY_A_CORE FT
Quyen is a 33 y.o. female who presents today for the following:    Chief Complaint   Patient presents with    Follow-up     C/O bleeding and cramping since 811/24; stopped earlier this week , now only spotting        No Known Allergies       Current Outpatient Medications:     norelgestromin-ethinyl estradiol (XULANE) 150-35 MCG/24HR, Place 1 patch onto the skin once a week, Disp: 9 patch, Rfl: 4    ondansetron (ZOFRAN-ODT) 4 MG disintegrating tablet, Take 1 tablet by mouth 3 times daily as needed for Nausea or Vomiting, Disp: 30 tablet, Rfl: 1    famotidine (PEPCID) 20 MG tablet, Take 1 tablet by mouth 2 times daily, Disp: 60 tablet, Rfl: 3     History reviewed. No pertinent past medical history.     History reviewed. No pertinent surgical history.     Family History   Problem Relation Age of Onset    Hypertension Father         Social History     Socioeconomic History    Marital status:      Spouse name: None    Number of children: None    Years of education: None    Highest education level: None   Tobacco Use    Smoking status: Never    Smokeless tobacco: Never   Vaping Use    Vaping status: Never Used   Substance and Sexual Activity    Alcohol use: Never    Drug use: Never    Sexual activity: Yes     Partners: Male        HPI   Here today for follow-up after TOP.  Patient reports having occasional cramping and bleeding still.  Natalya chorionic gonadotropin went from 252 to 106.  She is doing well today, has no complaints.    Review of Systems     REVIEW OF SYSTEMS:  Constitutional: Negative  HEENT: Negative  Eyes: Negative  Respiratory: Negative  Cardiovascular: Negative  Gastrointestinal: Negative  Genitourinary: Negative  Musculoskeletal: Negative  Skin: Negative  Neurological: Negative  Endo/heme: Negative  Psychiatric/behavioral: Negative    There were no vitals filed for this visit.   OBGyn Exam   Constitutional  .  Appearance: Well-nourished, well-developed, alert, in no acute distress    HEENT  .   CBC Full  -  ( 26 Jul 2023 14:10 )  WBC Count : 2.57 K/uL  RBC Count : 2.69 M/uL  Hemoglobin : 7.2 g/dL  Hematocrit : 22.6 %  Platelet Count - Automated : 73 K/uL  Mean Cell Volume : 84.0 fl  Mean Cell Hemoglobin : 26.8 pg  Mean Cell Hemoglobin Concentration : 31.9 gm/dL  Auto Neutrophil # : 2.05 K/uL  Auto Lymphocyte # : 0.30 K/uL  Auto Monocyte # : 0.10 K/uL  Auto Eosinophil # : 0.08 K/uL  Auto Basophil # : 0.02 K/uL  Auto Neutrophil % : 79.7 %  Auto Lymphocyte % : 11.7 %  Auto Monocyte % : 3.9 %  Auto Eosinophil % : 3.1 %  Auto Basophil % : 0.8 %  07-26    132<L>  |  99  |  102<H>  ----------------------------<  103<H>  5.3   |  22  |  15.80<H>    Ca    8.1<L>      26 Jul 2023 14:10  Phos  5.3     07-26  Mg     3.3     07-26    TPro  6.5  /  Alb  2.7<L>  /  TBili  0.7  /  DBili  x   /  AST  25  /  ALT  42  /  AlkPhos  89  07-26

## 2024-09-06 NOTE — PROGRESS NOTE ADULT - PROBLEM SELECTOR PLAN 4
A1C   holding home dose lantus   - C/w ISS  FS ACHS
F/U A1C   holding home dose lantus   - C/w ISS  FS ACHS
N/A

## 2024-09-15 NOTE — ASU PATIENT PROFILE, ADULT - NSTOBACCONEVERSMOKERY/N_GEN_A
Severe middle back pain.  Started 2 to 3 hour ago took oxycodone and ibu with no relief.     Triage Assessment (Adult)       Row Name 09/15/24 5047          Triage Assessment    Airway WDL WDL        Respiratory WDL    Respiratory WDL WDL        Skin Circulation/Temperature WDL    Skin Circulation/Temperature WDL WDL        Cardiac WDL    Cardiac WDL WDL        Peripheral/Neurovascular WDL    Peripheral Neurovascular WDL WDL        Cognitive/Neuro/Behavioral WDL    Cognitive/Neuro/Behavioral WDL WDL                      Yes

## 2024-09-21 NOTE — ED ADULT NURSE NOTE - NSFALLRISKASMT_ED_ALL_ED_DT
Pt reports that she has been experiencing right sided flank pain radiating around to the right abdomen. She states she has been seen multiple times for this. She also reports rash on toes, swollen toes, swollen hands, and rash that won't go away on inner thigh. Pt also reports that she is intermittently nauseous and vomiting which she uses Zofran for and also has been having diarrhea.      Triage Assessment (Adult)       Row Name 09/21/24 1454          Triage Assessment    Airway WDL WDL        Respiratory WDL    Respiratory WDL WDL        Skin Circulation/Temperature WDL    Skin Circulation/Temperature WDL WDL        Cardiac WDL    Cardiac WDL WDL        Peripheral/Neurovascular WDL    Peripheral Neurovascular WDL WDL        Cognitive/Neuro/Behavioral WDL    Cognitive/Neuro/Behavioral WDL WDL        Mascot Coma Scale    Best Eye Response 4-->(E4) spontaneous     Best Motor Response 6-->(M6) obeys commands     Best Verbal Response 5-->(V5) oriented     Alexis Coma Scale Score 15                      30-Nov-2023 20:00

## 2024-10-04 NOTE — ASU DISCHARGE PLAN (ADULT/PEDIATRIC) - DO NOT TAKE THE STERI STRIPS OFF
Refill request received for     Disp Refills Start End    methylpheniDATE (METADATE ER) 20 MG ER tablet 30 tablet 0 1/15/2021     Sig - Route: Take 1 tablet by mouth every morning. Indications: Attention Deficit Disorder - Oral      The pt was last seen on 1/15/2021. There is a future appointment scheduled for 7/16/2021. Unable to refill by protocol.        130.2 Statement Selected

## 2024-10-06 NOTE — H&P ADULT - PROBLEM SELECTOR PROBLEM 3
Resolved on discharge.   Required delvalle 9/12 for retenetion, per nursing had had issues on/off the last few adys and had immediate return of >300 cc once placed and very concentrated dark urine. Urien with sediment now and darker yellow on exam 9/13. Questionable infectious process with bacerueia, hold treatment for now per ID and monitor. DEEPALI, unclear cause if from retention, renal consulted, will f/u trends  Plan for voiding trial later in week once cr improves consistently  Plan for delvalle removal on 9/18 given procedure toady and Cr normalized/ATN resolved so wait until tomorrow so no confounding factors and order placed for delvalle removal today. Delvalle removed on 9/18 and able to void on his own.      HLD (hyperlipidemia)

## 2024-10-08 NOTE — ED ADULT TRIAGE NOTE - STATUS:
Chief complaint: Syncopal Episodes  Chief Complaint   Patient presents with    Heart Problem     HISTORY OF PRESENT ILLNESS     Princess García is a 72 year old female with a history of Syncopal episodes, abnormal echo 8/2019 with atrial level shunt, TIA in 2013-started on ASA, and family HX of strokes-sister at 65 and Mother.     Patient is here for two year follow up. Since then, patient has continued to follow ith PCP with no changes to medications or treatment plan.     Carotid US 10/2022 showed no evidence of significant stenosis bilaterally.     CT Calcium score 02/2024 total score of 0. New 6 mm right lower lobe nodule.     The patient presents today unaccompanied, and reports doing well from a cardiac standpoint. The patient does report experiencing one episode of chest pain a few months ago, which she attributes to indigestion. This was not exertional in nature and only occurred once. Recent testing and labs were reviewed with the patient during their appointment. The patient is compliant with all cardiac medications and reports tolerating them well. No reports of SOB, chest pain, palpitations, edema, syncope, TIA-like symptoms, and dizziness.    The patient does report a family history of stoke.     PAST MEDICAL, FAMILY AND SOCIAL HISTORY     The following histories were personally reviewed and updated.  Allergies, Problem list, Past Medical History, Past Surgical History, Social History and Family History    Current Outpatient Medications   Medication Sig Dispense Refill    Cyanocobalamin (VITAMIN B 12 PO)       VITAMIN D PO       Ascorbic Acid (VITAMIN C PO)       aspirin 81 MG EC tablet Take 1 tablet by mouth daily.      FLUoxetine HCl 20 MG tablet Take 20 mg by mouth every morning. 90 tablet 0    ALPRAZolam (XANAX) 0.25 MG OR TABS 1 TABLET 3 TIMES DAILY prn anxiety 30 Tab 0     No current facility-administered medications for this visit.     REVIEW OF SYSTEMS     Review of Systems   Respiratory:  Negative.  Negative for shortness of breath.    Cardiovascular: Negative.  Negative for chest pain, palpitations and leg swelling.   Neurological: Negative.  Negative for dizziness, syncope and light-headedness.   All other systems reviewed and are negative.    PHYSICAL EXAM     Vitals:    10/08/24 1159   BP: 118/70   Pulse: 67   Resp: 16   SpO2: 97%   Weight: 75.3 kg (166 lb)   Height: 5' 7\" (1.702 m)   LMP: 05/26/2006       Vitals reviewed.   Constitutional:       Appearance: Healthy appearance.   HENT:      Head: Normocephalic.   Neck:      Vascular: JVD normal.   Pulmonary:      Effort: Pulmonary effort is normal.      Breath sounds: No decreased breath sounds.   Cardiovascular:      PMI at left midclavicular line. Normal rate. Regular rhythm.      Murmurs: There is no murmur.      No rub.   Pulses:     Carotid:  on the right side with bruit.  Edema:     Peripheral edema absent.   Skin:     General: Skin is warm.   Neurological:      General: No focal deficit present.      Mental Status: Alert.       IMAGING/RESULTS     Labs 1/15/50443          Echocardiogram 8/6/2019  1. Systolic function is normal . The estimated ejection fraction is 60-65%. Wall motion is normal there are no regional motion abnormalities . Left ventricle diastolic function parameters are normal .  2. Atrial septum:  Agitated saline contrast study shows an atrial level shunt     CT Cardiac Calcium Scoring 10/16/2019  Total coronary artery calcium score is 0.     US Carotid 10/13/2020  1. No evidence of hemodynamically significant carotid stenosis.  2.  Bilateral antegrade vertebral artery flow.    US Carotid 10/12/2022  IMPRESSION:  1. Based on modified NASCET criteria adapted to duplex ultrasound, there is  no sonographic evidence of hemodynamically significant carotid artery  stenosis bilaterally.  2. Vertebral artery flow is antegrade bilaterally.     CT Calcium Scoring 2/9/2024  IMPRESSION:  Total coronary artery calcium score is 0. 0% of  people matched for age,  gender, and race/ethnicity who are free of clinical cardiovascular disease  and treated diabetes had less calcium than was detected in this study.  New 6 mm right lower lobe nodule. Recommend 6-month / 12 months  follow-up.      ASSESSMENT/PLAN     History of Syncopal Episodes  Probably a Vasovagal episode due to dehydration  Echo 8/2019 reviewed with pt shows Trivial PFO.  Patient denies any further episodes of syncope.    Mild carotid artery disease  Carotid US 10/2022 no evidence of significant stenosis bilaterally.   Denies any TIA like symptoms.   Continue aspirin therapy.  Will obtain repeat carotid US in 2 years for continued surveillance.    History of TIA in 2013 with difficulty speaking, Trivial PFO  No similar symptoms since 2013.  Echo 2019: Atrial septum: Agitated saline contrast study shows an atrial level shunt, trivial PFO  CT cardiac calcium scoring 02/2024: Total coronary artery calcium score is 0.  ASA 81 mg daily - denies any issues w/ bleeding or bruising     Hyperlipidemia  LDL  01/2024 109  Not currently on statin therapy, prefers not to be on statin. No need for one at this time.    Return in about 2 years (around 10/8/2026). Call or return to clinic as needed if these symptoms worsen, fail to improve as anticipated, or if new symptoms develop.    On 10/8/2024, Julia MCKAY scribed the services personally performed by Víctor Herring MD    Note:  The documentation recorded by the scribe accurately and completely reflects the service(s).  I attest that I performed all of the medical decision-making for the \"substantive portion\" of this visit.      Víctor Herring MD   Cardiology        [facility]

## 2024-10-08 NOTE — PROGRESS NOTE ADULT - PROBLEM SELECTOR PLAN 5
October 8, 2024     Patient: Vane Amin   YOB: 2013   Date of Visit: 10/8/2024       To Whom It May Concern:    Vane Amin was seen in my clinic on 10/8/2024 at 6:00 pm. Please excuse Vane for her absences from school, she has been ill.    If you have any questions or concerns, please don't hesitate to call.         Sincerely,         Diane Hartley, STEFFANIE-CNP        CC: No Recipients   - P/w intractable vomiting and coffee ground emesis  - S/p CT A/P negative   - C/w Protonix   - C/w Zofran PRN  - Sx-Dr. Quan consulted-No planned intervention   - GI-Dr. Lee consulted-No planned intervention

## 2024-11-20 NOTE — PHYSICAL THERAPY INITIAL EVALUATION ADULT - ADL SKILLS, REHAB EVAL
independent/needs device

## 2024-12-24 NOTE — ED ADULT NURSE NOTE - NS ED NURSE LEVEL OF CONSCIOUSNESS MENTAL STATUS
"Problem Specific Instructions:  Understand that often, stomachache/nausea/vomiting are likely due to the foods that we eat or the stomach bug." You can adjust your diet by including fiber, whole foods and avoiding any acidic, spicy or fried foods. Try a bland BRAT diet (bananas, rice, applesauce, toast). Take any medication prescribed to you today as prescribed. Return to the Emergency Department if you experience shortness of breath, worsening or uncontrolled abdominal or chest pain, headache, light headedness, feeling faint, nausea, vomiting, bloody vomit or stools, black tarry stools, or any other concerning symptoms.     If you received or are discharged with pain medicine or muscle relaxers, understand that they can make you sleepy or impair your judgement. Do not make important decisions, drink, drive, swim or perform any other tasks you would not otherwise perform while impaired for at least 24 hours after your last dose.      Ensure you follow up with your Primary Care Provider or any additional providers listed on this discharge sheet. While you may be healthy enough to go home today, I cannot predict the exact course of your diagnoses. It is important to remember that some problems are difficult to diagnose and may not be found during your first visit. As such, it is your responsibility to monitor symptoms, follow-up with another healthcare provider, or return to the emergency room for new or worsening concerns. Unless otherwise instructed, continue all home medications and any new medications prescribed to you in the Emergency Department.    "
Awake/Alert

## 2024-12-31 NOTE — H&P ADULT - NSHPPOADEEPVENOUSTHROMB_GEN_A_CORE
Orthopaedic Progress Note      CHIEF COMPLAINT: Right ulnar shaft fracture    HISTORY OF PRESENT ILLNESS:       Ms. Vera  is a 11 y.o. female  who presents with a right ulnar shaft fracture.  Patient is about 9 weeks out from injury she was treated nonoperatively initially in a long-arm cast and then a short arm cast.  She has recently been treated in a forearm splint.  She has been doing well with that.  She denies any pain to her arm.  She has good range of motion.  She is currently in dance and basketball.  She has been sitting out of basketball and doing dance with the brace on.  Patient denies numbness or tingling.  Overall doing well.  She is here today with her parents.      Past Medical History:    History reviewed. No pertinent past medical history.    Past Surgical History:    History reviewed. No pertinent surgical history.      Current  Medications:  No current outpatient medications on file.     No current facility-administered medications for this visit.       Allergies:  Patient has no known allergies.    Social History:   Social History     Tobacco Use   Smoking Status Never   Smokeless Tobacco Never     Social History     Substance and Sexual Activity   Alcohol Use Never     Social History     Substance and Sexual Activity   Drug Use Never       Family History:  Family History   Problem Relation Age of Onset    Other Mother     Breast Cancer Maternal Grandmother         age 31 and age 50    Heart Attack Maternal Grandfather 42       REVIEW OF SYSTEMS:  Constitutional: Denies any fever, chills.  Musculoskeletal: Positive for improved right arm pain.      PHYSICAL EXAM:  [unfilled]  General appearance:  Alert and oriented x 3. No apparent distress, appears stated age, calm and cooperative.   Musculoskeletal: Right upper extremity was examined.  Skin is all intact no rashes lesions or drainage.  No redness warmth or cellulitis.  Finger flexion and extension is intact.  Sensation intact 
no

## 2025-01-06 NOTE — ED PROVIDER NOTE - COVID-19 RESULT
POSITIVE
Treatment Goal Explanation (Does Not Render In The Note): Stable for the purposes of categorizing medical decision making is defined by the specific treatment goals for an individual patient. A patient that is not at their treatment goal is not stable, even if the condition has not changed and there is no short- term threat to life or function.
Treatment Goal Met?: no

## 2025-01-16 NOTE — PHYSICAL THERAPY INITIAL EVALUATION ADULT - GAIT DISTANCE, PT EVAL
PT name and  verified      39 yo last ov 1/3/25, next ov 25  , 39w4d      PT calling in hopes to speak with MD today, as she had originally planned to wait until she went into labor, as she is IVF pregnancy and she states she is high risk and \"getting nervous\", so she was wanting to see if she could possibly schedule an induction.  RN relayed MD was not in office, she could send a MC message to see if she may respond to her today, if not RN would send message, and PT has an ov tomorrow 25.  PT verbalizes understanding and states she will send MD a MC message today.      SYED   100 feet

## 2025-01-21 NOTE — PROGRESS NOTE ADULT - ASSESSMENT
Managed by Specialist - Dr. Jackie Byrd        62 year old male from [facility], walks with RW, with PMH of ESRD on HD (TTS), BKA- L w/prosthetic leg, DM, Left eye blindness, CHF, CAD, HTN, HLD, osteoporosis, bronchitis, current smoker, and anemia who presents with complaint of missed dialysis and does not make any urine. Patient states he was having mild shortness of breath. Patient admitted to telemetry for Acute Hyperkalemia 2/2 missed dialysis found to have pulmonary edema and BNP 417140. Cardiology and nephro following.     Additionally pt had coffee ground emesis and abdominal pain, repeat CT A/P was negative for small bowel obstruction. But noted for cholelithiasis and trace pericholecystic fluid. Surgery following, concern for possible acute cholecystitis, HIDA scan was negative for acute cholecystitis. Additionally GI was consulted for possible endoscopy due to recent anemia and coffee ground emesis, no additional intervention at this time.     Hospital course complicated by Acute Hypoxic Respiratory Failure 2/2 worsening CHF. Patient was a rapid response due to hypoxia of 70-80% on room air, now requiring 4L oxygen via nasal cannula. Repeat CXR showing worsening pulmonary edema. Pt also found to have small bilateral pleural effusions, bilateral atelectasis and possible aspiration pneumonia, patient was started on cefepime and henry, BETHANIE Newby was consulted.  62 year old male from [facility], walks with RW, with PMH of ESRD on HD (TTS), BKA- L w/prosthetic leg, DM, Left eye blindness, CHF, CAD, HTN, HLD, osteoporosis, bronchitis, current smoker, and anemia who presents with complaint of missed dialysis and does not make any urine. Patient states he was having shortness of breath. Patient admitted to telemetry for Acute Hyperkalemia 2/2 missed dialysis found to have pulmonary edema and BNP [id number]. Cardiology and nephro following.     Additionally pt had coffee ground emesis and abdominal pain, repeat CT A/P was negative for small bowel obstruction. But noted for cholelithiasis and trace pericholecystic fluid. Surgery following, concern for possible acute cholecystitis, HIDA scan was negative for acute cholecystitis. Additionally GI was consulted for possible endoscopy due to recent anemia and coffee ground emesis, no additional intervention at this time.     Hospital course complicated by Acute Hypoxic Respiratory Failure 2/2 worsening CHF. Patient was a rapid response due to hypoxia of 70-80% on room air, now requiring 4L oxygen via nasal cannula. Repeat CXR showing worsening pulmonary edema. Pt also found to have small bilateral pleural effusions, bilateral atelectasis and possible aspiration pneumonia, patient was started on cefepime and henry, ID Dr. Newby was consulted.  62 year old male from [facility], walks with RW, with PMH of ESRD on HD (TTS), BKA- L w/prosthetic leg, DM, Left eye blindness, CHF, CAD, HTN, HLD, osteoporosis, bronchitis, current smoker, and anemia who presents with complaint of missed dialysis and does not make any urine. Patient states he was having shortness of breath. Patient admitted to telemetry for Acute Hyperkalemia 2/2 missed dialysis found to have pulmonary edema and BNP 316145. Cardiology and nephro following.     Additionally pt had coffee ground emesis and abdominal pain, repeat CT A/P was negative for small bowel obstruction. But noted for cholelithiasis and trace pericholecystic fluid. Surgery following, concern for possible acute cholecystitis, HIDA scan was negative for acute cholecystitis. Additionally GI was consulted for possible endoscopy due to recent anemia and coffee ground emesis, no additional intervention at this time.     Hospital course complicated by Acute Hypoxic Respiratory Failure 2/2 worsening CHF. Patient was a rapid response due to hypoxia of 70-80% on room air, now requiring 4L oxygen via nasal cannula. Repeat CXR showing worsening pulmonary edema. Pt also found to have small bilateral pleural effusions, bilateral atelectasis and possible aspiration pneumonia, patient was started on cefepime and flagyl, ID Dr. Newby was consulted.    Pt refused dialysis today, will re-attempt on 8/1. Pt still has nausea/vomiting, will keep NPO.   62 year old male from [facility], walks with RW, with PMH of ESRD on HD (TTS), BKA- L w/prosthetic leg, DM, Left eye blindness, CHF, CAD, HTN, HLD, osteoporosis, bronchitis, current smoker, and anemia who presents with complaint of missed dialysis and does not make any urine. Patient states he was having shortness of breath. Patient admitted to telemetry for Acute Hyperkalemia 2/2 missed dialysis found to have pulmonary edema and BNP 117072. Cardiology and nephro following.     Additionally pt had coffee ground emesis and abdominal pain, repeat CT A/P was negative for small bowel obstruction. But noted for cholelithiasis and trace pericholecystic fluid. Surgery following, concern for possible acute cholecystitis, HIDA scan was negative for acute cholecystitis. Additionally GI was consulted for possible endoscopy due to recent anemia and coffee ground emesis, no additional intervention at this time.     Hospital course complicated by Acute Hypoxic Respiratory Failure 2/2 worsening CHF. Patient was a rapid response due to hypoxia of 70-80% on room air, now requiring 4L oxygen via nasal cannula. Repeat CXR showing worsening pulmonary edema. Pt also found to have small bilateral pleural effusions, bilateral atelectasis and possible aspiration pneumonia, patient was started on cefepime and flagyl, ID Dr. Newby was consulted.    Pt refused dialysis today, will re-attempt on 8/1. Pt still has nausea/vomiting, will keep NPO.    Brother Georgi King updated on current plan, will try to speak on the phone with brother.

## 2025-02-26 NOTE — ED ADULT NURSE NOTE - NSSUSCREENINGQ5_ED_ALL_ED
Date of Service 2/26/2025    BRUCE BENTON  Date of Birth 1/30/1985    Patient Age: 40 year old    PCP: Bertha Phipps MD  [address] [address] [place] [id number]    Heart Scan Consult  Preliminary Heart Scan Score: 9.53    Previous Screening  Heart Scan Completed Previously: No        Peripheral Vascular Scan Completed Previously: No          Risk Factors  Personal Risk Factors  Non-alterable Risk Factors: Gender;Family History (Mother has stent and CABG)  Alterable Risk Factors: Abnormal Cholesterol;Unhealthy eating      Body Mass Index  Body mass index is 26.69 kg/m².    Blood Pressure  Blood Pressure measurement declined during this encounter.  (Normal =< 120/80,  Elevated = 120-129/ >80,  High Stage1 130-139/80-89 , Stage2 >140/>90)    Lipid Profile  Patient was in fasting state: No    Cholesterol: 196, done on 2/26/2025.  HDL Cholesterol: 71, done on 2/26/2025.  LDL Cholesterol: 109, done on 2/26/2025.  TriGlycerides 80, done on 2/26/2025.    Cholesterol Goals  Value   Total  =< 200   HDL  = > 45 Men = > 55 Women   LDL   =< 100   Triglycerides  =< 150       Glucose and Hemoglobin A1C  Lab Results   Component Value Date     (A) 02/26/2025     (Normal Fasting Glucose < 100mg/dl )    Nurse Review  Risk factor information and results reviewed with Nurse: Yes    Recommended Follow Up:  Consult your physician regarding:: Final Heart Scan Report;Discuss potential for Incidental Finding;Discuss Potential for Score Variance      Recommendations for Change:  Nutrition Changes: Low Saturated Fat;Increase Fiber (Reports eating beef 2x week, some chicken, pizza. Encouraged to limit beef and choose leaner cuts. Uses olive oil for cooking.)    Cholesterol Modification (goal of therapy depends upon your risk): Decrease LDL (Lousy/Bad) Ideal <100    Exercise: Enhance Current Program (Weight lifting 4-5 times per week and encouraged to add some aerobic exercise into routine.)    Smoking Cessation: Not  Ready to Quit (Uses chewing tobacco pouches and encourged to quit.)    Weight Management: Maintain Current Weight    Stress Management: Adopt Stress Management Techniques    Repeat Heart Scan: 3 Years if Calcium Score is > 0.0;Discuss with your Physician       Final heart scan score may take up to 2 weeks.       Patricia Recommended Resources:  Recommended Resources: Upcoming Classes, Medical Services and Health Library [email or web address];Smoking Cessation Options [phone]            Pamela HERNANDEZ RN        Please Contact the Nurse Heart Line with any Questions or Concerns [phone].   No

## 2025-04-02 NOTE — H&P PST ADULT - NS HEP C RISK YEAR OPTION
----- Message from Seb Lake MD sent at 4/2/2025 10:33 AM CDT -----  Normal CT of sinuses   Speaking Coherently Patient Refused

## 2025-05-03 NOTE — PROGRESS NOTE ADULT - PROBLEM SELECTOR PLAN 4
Unable to assess due to patient's cognitive impairment - UNABLE TO FOLLOW LABS, - pt refuses daily labs   - Phosphorus is 6.6  - C/w phoslo and monitor

## 2025-05-06 NOTE — CONSULT NOTE ADULT - CONSULT REASON
PATIENT Esthela Esteban   : 1940    CHIEF COMPLAINT  Chief Complaint   Patient presents with   • fatigue     Feeling tired. Unexpected weight loss. Recent Dx of Fistula in carotid artery.  Diarrhea.     HISTORY OF PRESENT ILLNESS  Ms. Esteban is a 85 year old female with HPI  Weight loss and Diarrhea, referral to GI Dr Abrams  Fatigue, dx fistual of carotid artery/thyroid artery (seeing Dr Mccray)  Family/social issues  Memory loss, referral to neuro Dr Wahl  Left ear hearing loss, referral to Dr Villalba    Review of Systems   Constitutional:  Positive for fatigue and unexpected weight change. Negative for activity change.   HENT:  Positive for hearing loss. Negative for ear pain and sinus pain.    Eyes:  Negative for pain.   Respiratory:  Negative for cough and shortness of breath.    Cardiovascular:  Negative for chest pain, palpitations and leg swelling.   Gastrointestinal:  Negative for abdominal pain, constipation, diarrhea, nausea and vomiting.   Genitourinary:  Negative for dysuria.   Musculoskeletal:  Negative for arthralgias and myalgias.   Neurological:  Negative for dizziness, weakness, light-headedness, numbness and headaches.        Memory loss     Psychiatric/Behavioral:  Negative for self-injury, sleep disturbance and suicidal ideas. The patient is not nervous/anxious.    All other systems reviewed and are negative.       PAST MEDICAL HISTORY    Cerebral infarction (CMD)                                     Fracture                                                      Chronic pain                                                  Anxiety                                                       High cholesterol                                              Bronchitis                                                    COPD (chronic obstructive pulmonary disease)  *               Pneumonia                                                     Sinusitis, chronic                                            Urinary 
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*               Systemic lupus  (CMD)                                         Lack of bladder control                                       Asthma (CMD)                                                  HTN (hypertension)                                            Stroke  (CMD)                                                 Rheumatoid arthritis (CMD)                                    Skin cancer                                                   Depression                                                    Allergic rhinitis                                             PAST SURGICAL HISTORY    VASCULAR SURGERY                                              APPENDECTOMY                                                  EYE SURGERY                                                   ROTATOR CUFF REPAIR                                           REMV 2ND CATARACT,CORN-SCLER SECTN                            IR SACROPLASTY                                                TONSILLECTOMY                                                 DILATION AND CURETTAGE                                          Comment: x2    VEIN LIGATION                                                   Comment: x2    IR PHLEBECTOMY                                                SKIN CANCER EXCISION                                          BACK SURGERY                                                  Family History   Problem Relation Age of Onset   • COPD Mother    • Alcohol Abuse Mother    • Emphysema Mother    • Stroke Father    • Hypertension Father    • Heart disease Father    • Rheumatoid Arthritis Daughter    • Bipolar disorder Son        Social History     Tobacco Use   • Smoking status: Never   • Smokeless tobacco: Never   Vaping Use 
  • Vaping status: never used   Substance Use Topics   • Alcohol use: Yes     Alcohol/week: 1.0 standard drink of alcohol     Types: 1 Glasses of wine per week     Comment: every dinner   • Drug use: Never       ALLERGIES:   Allergen Reactions   • Hydrocodone HIVES   • Latex Other (See Comments)     Other reaction(s): RASH-WHEEZING     • Oxycodone RASH     Itching as well per patient   • Wound Dressing Adhesive Other (See Comments)     Other reaction(s): RASH   • Bee Venom Other (See Comments)     Other reaction(s): swelling, rash,pain   • Codeine HIVES       Current Medications    ACETAMINOPHEN (TYLENOL) 325 MG TABLET    Take 2 tablets by mouth every 4 hours as needed for Pain or Fever.    ALBUTEROL 108 (90 BASE) MCG/ACT INHALER    Inhale 1 puff into the lungs every 4 hours as needed for Shortness of Breath or Wheezing.    ASPIRIN (ECOTRIN) 81 MG EC TABLET    Take 81 mg by mouth every morning.    ESTRADIOL (ESTRACE) 0.1 MG/GM VAGINAL CREAM    1 gram Vaginal twice a week at bedtime    FLUTICASONE-UMECLIDIN-VILANTEROL (TRELEGY ELLIPTA) 100-62.5-25 MCG/INH INHALER    Inhale 1 puff into the lungs every morning.    MONTELUKAST (SINGULAIR) 10 MG TABLET    Take 10 mg by mouth nightly.    SERTRALINE (ZOLOFT) 100 MG TABLET    Take 1 tablet by mouth daily.       Visit Vitals  BP (!) 90/58   Pulse 84   Temp 98 °F (36.7 °C) (Oral)   Resp 16   Ht 5' 5\" (1.651 m)   Wt 54.9 kg (121 lb 0.5 oz)   SpO2 98%   BMI 20.14 kg/m²       Physical Exam  Vitals and nursing note reviewed.   Constitutional:       General: She is not in acute distress.     Appearance: Normal appearance. She is not ill-appearing or toxic-appearing.   HENT:      Head: Normocephalic and atraumatic.      Right Ear: Tympanic membrane normal.      Left Ear: Tympanic membrane normal.      Nose: Nose normal.      Mouth/Throat:      Mouth: Mucous membranes are moist.      Pharynx: Oropharynx is clear.   Eyes:      Conjunctiva/sclera: Conjunctivae normal.      Pupils: 
Pupils are equal, round, and reactive to light.   Cardiovascular:      Rate and Rhythm: Normal rate.      Pulses: Normal pulses.   Pulmonary:      Effort: Pulmonary effort is normal.   Skin:     General: Skin is warm and dry.      Capillary Refill: Capillary refill takes less than 2 seconds.   Neurological:      General: No focal deficit present.      Mental Status: She is alert and oriented to person, place, and time. Mental status is at baseline.   Psychiatric:         Mood and Affect: Mood normal.         Behavior: Behavior normal.         Thought Content: Thought content normal.         Judgment: Judgment normal.          Labs:  No results found for this visit on 05/06/25.   No visits with results within 1 Week(s) from this visit.   Latest known visit with results is:   Hospital Outpatient Visit on 04/01/2025   Component Date Value Ref Range Status   • Creatinine, POC 04/01/2025 1.2  mg/dL Final        ASSESSMENT AND PLAN  Problem List Items Addressed This Visit        ENT    Hearing loss of left ear    Referral to ENT provided         Relevant Orders    SERVICE TO ENT       Endocrine and Metabolic    Weight loss, unintentional - Primary    Progressive problem, excessive stress  Referral to GI provided  Routine lab orders   Push protein and caloric intake  Red flag ss discussed         Relevant Orders    SERVICE TO GASTROENTEROLOGY       Musculoskeletal and Injuries    Fall at home    Recent, did not go to the hospital  Did incur a closed head injury per patient report  Referral to neuro provided  Fall prevention discussed  Red flag ss dsicussed         Relevant Orders    SERVICE TO NEUROLOGY       Neuro    Memory loss    Referral to neuro provided         Relevant Orders    SERVICE TO NEUROLOGY    Closed head injury without loss of consciousness    Recent, did not go to the hospital  Did incur a closed head injury per patient report  Referral to neuro provided  Fall prevention discussed  Red flag ss dsicussed   
      Relevant Orders    SERVICE TO NEUROLOGY   Other Visit Diagnoses       Screening for diabetes mellitus (DM)        Relevant Orders    Glycohemoglobin      Screening for deficiency anemia        Relevant Orders    CBC with Automated Differential      Nephropathy screen        Relevant Orders    Comprehensive Metabolic Panel      Screening cholesterol level        Relevant Orders    Lipid Panel With Reflex      Thyroid disorder screen        Relevant Orders    Thyroid Stimulating Hormone Reflex      Vitamin D deficiency        Relevant Orders    Vitamin D -25 Hydroxy      Screening for blood or protein in urine        Relevant Orders    Urinalysis & Reflex Microscopy With Culture If Indicated           Please enter a base name.     Orders Placed This Encounter   • Glycohemoglobin   • CBC with Automated Differential   • Comprehensive Metabolic Panel   • Lipid Panel With Reflex   • Urinalysis & Reflex Microscopy With Culture If Indicated   • Vitamin D -25 Hydroxy   • Thyroid Stimulating Hormone Reflex   • SERVICE TO NEUROLOGY   • SERVICE TO ENT   • SERVICE TO GASTROENTEROLOGY       There are no discontinued medications.    Return in 1 month (on 6/6/2025), or if symptoms worsen or fail to improve.    Spent greater than 45 minutes reviewing the patient chart, assessing the patient and discussing the POC. Patient verbally agreed to POC.   Refills may be given through the follow-up timeframe and for one  courtesy refill if follow-up timeframe is not met  Refills may not be authorized for management of acute illnesses regardless of follow-up timeframe  The patient verbalized understanding of the plan of care and the risks involved with treatment and agreed.   If any chest pain, shortness of breath, dizziness or syncope occur the patient was asked to call EMS or report the local ED      Note to patient: The 21st Century Cures Act makes medical notes like these available to patients in the interest of transparency. 
Anemia
ESRD
However, be advised this is a medical document. It is intended as peer to peer communication. It is written in medical language and may contain abbreviations or verbiage that are unfamiliar. It may appear blunt or direct. Medical documents are intended to carry relevant information, facts as evident, and the clinical opinion of the practitioner.      Gretchen Oconnor, CNP

## 2025-06-30 NOTE — DISCHARGE NOTE PROVIDER - NSDCCPCAREPLAN_GEN_ALL_CORE_FT
PRINCIPAL DISCHARGE DIAGNOSIS  Diagnosis: Upper GI bleed  Assessment and Plan of Treatment: You were  brought in because you were having bloddy vomitus. So you were admitted for upper GI bleed. You were seen by a gastroenterologist Dr. Lee, who recommended that you undergo an endoscopy to find out the reasons for your symptoms. However, you declined to have endoscopy here in the hospital rather you want to follow up with out patient GI doctor for possible endoscopy. You were started on a diet and advanced your diet as tolerated. You were also given medications such as Protonix to help with your symptoms of GI bleed.   You are being sent bact to the nursing home with another medication called Carafate to help with your GI bleed.   There are 2 common types of GI Bleed, Upper GI Bleed and Lower GI Bleed.  Upper GI Bleed affects the esophagus, stomach, and first part of the small intestine. Lower GI Bleed affects the colon and rectum.  Upper GI Bleed signs and symptoms to notify your Health Care Provider are vomiting blood, or coffee ground vomitus, and bowel movements that look like black tar.  Lower GI Bleed signs and symptoms to notify your health care provider are bright red bloody bowel movements.   Take your medications as prescribed by your Gastroenterologist.  If you have had an Endoscopy or Colonoscopy, follow up with your Gastroenterologist for Pathology results.  Avoid NSAIDs unless your Health Care Provider tells you that it is ok (Aspirin, Ibuprofen, Advil, Motrin, Aleve).  Follow up with your Gastroenterologist within 1-2 weeks of discharge.        SECONDARY DISCHARGE DIAGNOSES  Diagnosis: ESRD on dialysis  Assessment and Plan of Treatment: You have a history of ESRD on hemo dialysis Tue/Thurs/Sat. You were noted on admission to have a high potassium level likely due to your history of ESRD. Your were treated with a medication called Lokelma which helps to lower potassium level in the blood stream. You were also dialysed which also helps to get rid of the elevated potassium level. Your potassium level was rechecked after HD and the level came back to normal level.  Continue with your dialysis treatments. Follow with your nephrologist (kidney physician). Continue your medications as prescribed.      
English

## 2025-07-14 NOTE — H&P ADULT - PROBLEM SELECTOR PROBLEM 2
Pt refused to be seen by barney . Pt will go to a walk in clinic    ESRD (end stage renal disease) on dialysis

## 2025-07-27 NOTE — ED ADULT NURSE NOTE - PAIN RATING/NUMBER SCALE (0-10): REST
Perfect serve sent to Blanka Cobb MD, Hospitalist regarding pt's aggressiveness and combativeness this shift requesting assistance.   0

## (undated) DEVICE — CATH IV SAFE BC 22G X 1" (BLUE)

## (undated) DEVICE — GOWN LG

## (undated) DEVICE — BITE BLOCK ADULT 20 X 27MM (GREEN)

## (undated) DEVICE — TUBING SUCTION NONCONDUCTIVE 6MM X 12FT

## (undated) DEVICE — LINE BREATHE SAMPLNG

## (undated) DEVICE — DRSG BANDAID 0.75X3"

## (undated) DEVICE — DENTURE CUP PINK

## (undated) DEVICE — BIOPSY FORCEP COLD DISP

## (undated) DEVICE — DRSG 2X2

## (undated) DEVICE — CONTAINER FORMALIN 80ML YELLOW

## (undated) DEVICE — TUBING MEDI-VAC W MAXIGRIP CONNECTORS 1/4"X6'

## (undated) DEVICE — FACESHIELD FULL VISOR

## (undated) DEVICE — BASIN EMESIS 10IN GRADUATED MAUVE

## (undated) DEVICE — SALIVA EJECTOR (BLUE)

## (undated) DEVICE — DRSG CURITY GAUZE SPONGE 4 X 4" 12-PLY NON-STERILE

## (undated) DEVICE — PACK IV START WITH CHG

## (undated) DEVICE — TUBING IV SET GRAVITY 3Y 100" MACRO

## (undated) DEVICE — CLAMP BX HOT RAD JAW 3

## (undated) DEVICE — ELCTR GROUNDING PAD ADULT COVIDIEN

## (undated) DEVICE — ELCTR ECG CONDUCTIVE ADHESIVE

## (undated) DEVICE — LUBRICATING JELLY HR ONE SHOT 3G

## (undated) DEVICE — UNDERPAD LINEN SAVER 17 X 24"

## (undated) DEVICE — BIOPSY FORCEP RADIAL JAW 4 STANDARD WITH NEEDLE